# Patient Record
Sex: MALE | Race: WHITE | NOT HISPANIC OR LATINO | URBAN - METROPOLITAN AREA
[De-identification: names, ages, dates, MRNs, and addresses within clinical notes are randomized per-mention and may not be internally consistent; named-entity substitution may affect disease eponyms.]

---

## 2018-09-28 PROBLEM — Z00.00 ENCOUNTER FOR PREVENTIVE HEALTH EXAMINATION: Status: ACTIVE | Noted: 2018-09-28

## 2018-10-01 ENCOUNTER — OUTPATIENT (OUTPATIENT)
Dept: OUTPATIENT SERVICES | Facility: HOSPITAL | Age: 58
LOS: 1 days | End: 2018-10-01
Payer: MEDICAID

## 2018-10-01 PROCEDURE — G9001: CPT

## 2018-10-02 ENCOUNTER — APPOINTMENT (OUTPATIENT)
Dept: PULMONOLOGY | Facility: CLINIC | Age: 58
End: 2018-10-02

## 2018-10-03 ENCOUNTER — INPATIENT (INPATIENT)
Facility: HOSPITAL | Age: 58
LOS: 28 days | Discharge: HOME | DRG: 189 | End: 2018-11-01
Attending: HOSPITALIST | Admitting: HOSPITALIST
Payer: MEDICAID

## 2018-10-03 VITALS
DIASTOLIC BLOOD PRESSURE: 84 MMHG | RESPIRATION RATE: 22 BRPM | OXYGEN SATURATION: 98 % | HEART RATE: 84 BPM | TEMPERATURE: 99 F | SYSTOLIC BLOOD PRESSURE: 188 MMHG

## 2018-10-03 DIAGNOSIS — I50.9 HEART FAILURE, UNSPECIFIED: ICD-10-CM

## 2018-10-03 LAB
ALBUMIN SERPL ELPH-MCNC: 4.5 G/DL — SIGNIFICANT CHANGE UP (ref 3.3–5.2)
ALP SERPL-CCNC: 70 U/L — SIGNIFICANT CHANGE UP (ref 40–120)
ALT FLD-CCNC: 17 U/L — SIGNIFICANT CHANGE UP
ANION GAP SERPL CALC-SCNC: 9 MMOL/L — SIGNIFICANT CHANGE UP (ref 5–17)
APTT BLD: 37.7 SEC — HIGH (ref 27.5–37.4)
AST SERPL-CCNC: 15 U/L — SIGNIFICANT CHANGE UP
BASE EXCESS BLDA CALC-SCNC: 8.5 MMOL/L — HIGH (ref -2–2)
BILIRUB SERPL-MCNC: 0.5 MG/DL — SIGNIFICANT CHANGE UP (ref 0.4–2)
BLOOD GAS COMMENTS ARTERIAL: SIGNIFICANT CHANGE UP
BUN SERPL-MCNC: 15 MG/DL — SIGNIFICANT CHANGE UP (ref 8–20)
CALCIUM SERPL-MCNC: 9.4 MG/DL — SIGNIFICANT CHANGE UP (ref 8.6–10.2)
CHLORIDE SERPL-SCNC: 90 MMOL/L — LOW (ref 98–107)
CO2 SERPL-SCNC: 35 MMOL/L — HIGH (ref 22–29)
CREAT SERPL-MCNC: 0.64 MG/DL — SIGNIFICANT CHANGE UP (ref 0.5–1.3)
EOSINOPHIL # BLD AUTO: 0 K/UL — SIGNIFICANT CHANGE UP (ref 0–0.5)
EOSINOPHIL NFR BLD AUTO: 0.1 % — SIGNIFICANT CHANGE UP (ref 0–5)
GAS PNL BLDA: SIGNIFICANT CHANGE UP
GLUCOSE SERPL-MCNC: 259 MG/DL — HIGH (ref 70–115)
HCO3 BLDA-SCNC: 32 MMOL/L — HIGH (ref 20–26)
HCT VFR BLD CALC: 51.4 % — SIGNIFICANT CHANGE UP (ref 42–52)
HGB BLD-MCNC: 15.8 G/DL — SIGNIFICANT CHANGE UP (ref 14–18)
HOROWITZ INDEX BLDA+IHG-RTO: SIGNIFICANT CHANGE UP
INR BLD: 1.11 RATIO — SIGNIFICANT CHANGE UP (ref 0.88–1.16)
LYMPHOCYTES # BLD AUTO: 1.3 K/UL — SIGNIFICANT CHANGE UP (ref 1–4.8)
LYMPHOCYTES # BLD AUTO: 14.3 % — LOW (ref 20–55)
MCHC RBC-ENTMCNC: 28.8 PG — SIGNIFICANT CHANGE UP (ref 27–31)
MCHC RBC-ENTMCNC: 30.7 G/DL — LOW (ref 32–36)
MCV RBC AUTO: 93.8 FL — SIGNIFICANT CHANGE UP (ref 80–94)
MONOCYTES # BLD AUTO: 0.8 K/UL — SIGNIFICANT CHANGE UP (ref 0–0.8)
MONOCYTES NFR BLD AUTO: 8.7 % — SIGNIFICANT CHANGE UP (ref 3–10)
NEUTROPHILS # BLD AUTO: 6.8 K/UL — SIGNIFICANT CHANGE UP (ref 1.8–8)
NEUTROPHILS NFR BLD AUTO: 76.2 % — HIGH (ref 37–73)
NT-PROBNP SERPL-SCNC: 1242 PG/ML — HIGH (ref 0–300)
PCO2 BLDA: 69 MMHG — HIGH (ref 35–45)
PH BLDA: 7.34 — LOW (ref 7.35–7.45)
PLATELET # BLD AUTO: 148 K/UL — LOW (ref 150–400)
PO2 BLDA: 57 MMHG — LOW (ref 83–108)
POTASSIUM SERPL-MCNC: 5 MMOL/L — SIGNIFICANT CHANGE UP (ref 3.5–5.3)
POTASSIUM SERPL-SCNC: 5 MMOL/L — SIGNIFICANT CHANGE UP (ref 3.5–5.3)
PROT SERPL-MCNC: 7.3 G/DL — SIGNIFICANT CHANGE UP (ref 6.6–8.7)
PROTHROM AB SERPL-ACNC: 12.2 SEC — SIGNIFICANT CHANGE UP (ref 9.8–12.7)
RBC # BLD: 5.48 M/UL — SIGNIFICANT CHANGE UP (ref 4.6–6.2)
RBC # FLD: 14.9 % — SIGNIFICANT CHANGE UP (ref 11–15.6)
SAO2 % BLDA: 92 % — LOW (ref 95–99)
SODIUM SERPL-SCNC: 134 MMOL/L — LOW (ref 135–145)
WBC # BLD: 8.9 K/UL — SIGNIFICANT CHANGE UP (ref 4.8–10.8)
WBC # FLD AUTO: 8.9 K/UL — SIGNIFICANT CHANGE UP (ref 4.8–10.8)

## 2018-10-03 PROCEDURE — 99285 EMERGENCY DEPT VISIT HI MDM: CPT

## 2018-10-03 PROCEDURE — 93010 ELECTROCARDIOGRAM REPORT: CPT

## 2018-10-03 PROCEDURE — 71046 X-RAY EXAM CHEST 2 VIEWS: CPT | Mod: 26

## 2018-10-03 PROCEDURE — 99497 ADVNCD CARE PLAN 30 MIN: CPT | Mod: 25

## 2018-10-03 PROCEDURE — 99223 1ST HOSP IP/OBS HIGH 75: CPT

## 2018-10-03 RX ORDER — FUROSEMIDE 40 MG
40 TABLET ORAL EVERY 12 HOURS
Qty: 0 | Refills: 0 | Status: DISCONTINUED | OUTPATIENT
Start: 2018-10-03 | End: 2018-10-04

## 2018-10-03 RX ORDER — METOPROLOL TARTRATE 50 MG
12.5 TABLET ORAL
Qty: 0 | Refills: 0 | Status: DISCONTINUED | OUTPATIENT
Start: 2018-10-03 | End: 2018-10-12

## 2018-10-03 RX ORDER — DEXTROSE 50 % IN WATER 50 %
25 SYRINGE (ML) INTRAVENOUS ONCE
Qty: 0 | Refills: 0 | Status: DISCONTINUED | OUTPATIENT
Start: 2018-10-03 | End: 2018-11-01

## 2018-10-03 RX ORDER — GLUCAGON INJECTION, SOLUTION 0.5 MG/.1ML
1 INJECTION, SOLUTION SUBCUTANEOUS ONCE
Qty: 0 | Refills: 0 | Status: DISCONTINUED | OUTPATIENT
Start: 2018-10-03 | End: 2018-11-01

## 2018-10-03 RX ORDER — ASPIRIN/CALCIUM CARB/MAGNESIUM 324 MG
81 TABLET ORAL DAILY
Qty: 0 | Refills: 0 | Status: DISCONTINUED | OUTPATIENT
Start: 2018-10-03 | End: 2018-11-01

## 2018-10-03 RX ORDER — PANTOPRAZOLE SODIUM 20 MG/1
40 TABLET, DELAYED RELEASE ORAL
Qty: 0 | Refills: 0 | Status: DISCONTINUED | OUTPATIENT
Start: 2018-10-03 | End: 2018-11-01

## 2018-10-03 RX ORDER — ATORVASTATIN CALCIUM 80 MG/1
40 TABLET, FILM COATED ORAL AT BEDTIME
Qty: 0 | Refills: 0 | Status: DISCONTINUED | OUTPATIENT
Start: 2018-10-03 | End: 2018-11-01

## 2018-10-03 RX ORDER — DEXTROSE 50 % IN WATER 50 %
12.5 SYRINGE (ML) INTRAVENOUS ONCE
Qty: 0 | Refills: 0 | Status: DISCONTINUED | OUTPATIENT
Start: 2018-10-03 | End: 2018-11-01

## 2018-10-03 RX ORDER — LISINOPRIL 2.5 MG/1
5 TABLET ORAL DAILY
Qty: 0 | Refills: 0 | Status: DISCONTINUED | OUTPATIENT
Start: 2018-10-03 | End: 2018-10-11

## 2018-10-03 RX ORDER — IPRATROPIUM/ALBUTEROL SULFATE 18-103MCG
3 AEROSOL WITH ADAPTER (GRAM) INHALATION EVERY 6 HOURS
Qty: 0 | Refills: 0 | Status: DISCONTINUED | OUTPATIENT
Start: 2018-10-03 | End: 2018-11-01

## 2018-10-03 RX ORDER — ACETAMINOPHEN 500 MG
650 TABLET ORAL EVERY 6 HOURS
Qty: 0 | Refills: 0 | Status: DISCONTINUED | OUTPATIENT
Start: 2018-10-03 | End: 2018-10-31

## 2018-10-03 RX ORDER — SODIUM CHLORIDE 9 MG/ML
1000 INJECTION, SOLUTION INTRAVENOUS
Qty: 0 | Refills: 0 | Status: DISCONTINUED | OUTPATIENT
Start: 2018-10-03 | End: 2018-11-01

## 2018-10-03 RX ORDER — FUROSEMIDE 40 MG
40 TABLET ORAL ONCE
Qty: 0 | Refills: 0 | Status: COMPLETED | OUTPATIENT
Start: 2018-10-03 | End: 2018-10-03

## 2018-10-03 RX ORDER — DEXTROSE 50 % IN WATER 50 %
15 SYRINGE (ML) INTRAVENOUS ONCE
Qty: 0 | Refills: 0 | Status: DISCONTINUED | OUTPATIENT
Start: 2018-10-03 | End: 2018-11-01

## 2018-10-03 RX ORDER — INSULIN LISPRO 100/ML
10 VIAL (ML) SUBCUTANEOUS ONCE
Qty: 0 | Refills: 0 | Status: COMPLETED | OUTPATIENT
Start: 2018-10-03 | End: 2018-10-03

## 2018-10-03 RX ORDER — INSULIN LISPRO 100/ML
VIAL (ML) SUBCUTANEOUS
Qty: 0 | Refills: 0 | Status: DISCONTINUED | OUTPATIENT
Start: 2018-10-03 | End: 2018-10-06

## 2018-10-03 RX ORDER — ASPIRIN/CALCIUM CARB/MAGNESIUM 324 MG
81 TABLET ORAL DAILY
Qty: 0 | Refills: 0 | Status: DISCONTINUED | OUTPATIENT
Start: 2018-10-03 | End: 2018-10-03

## 2018-10-03 RX ORDER — ALPRAZOLAM 0.25 MG
1 TABLET ORAL
Qty: 0 | Refills: 0 | Status: DISCONTINUED | OUTPATIENT
Start: 2018-10-03 | End: 2018-10-10

## 2018-10-03 RX ORDER — CLOPIDOGREL BISULFATE 75 MG/1
75 TABLET, FILM COATED ORAL DAILY
Qty: 0 | Refills: 0 | Status: DISCONTINUED | OUTPATIENT
Start: 2018-10-03 | End: 2018-11-01

## 2018-10-03 RX ORDER — INSULIN GLARGINE 100 [IU]/ML
10 INJECTION, SOLUTION SUBCUTANEOUS ONCE
Qty: 0 | Refills: 0 | Status: COMPLETED | OUTPATIENT
Start: 2018-10-03 | End: 2018-10-04

## 2018-10-03 RX ORDER — HEPARIN SODIUM 5000 [USP'U]/ML
5000 INJECTION INTRAVENOUS; SUBCUTANEOUS EVERY 8 HOURS
Qty: 0 | Refills: 0 | Status: DISCONTINUED | OUTPATIENT
Start: 2018-10-03 | End: 2018-10-06

## 2018-10-03 RX ORDER — IPRATROPIUM/ALBUTEROL SULFATE 18-103MCG
3 AEROSOL WITH ADAPTER (GRAM) INHALATION ONCE
Qty: 0 | Refills: 0 | Status: COMPLETED | OUTPATIENT
Start: 2018-10-03 | End: 2018-10-03

## 2018-10-03 RX ADMIN — Medication 3 MILLILITER(S): at 19:11

## 2018-10-03 RX ADMIN — Medication 100 MILLIGRAM(S): at 19:40

## 2018-10-03 RX ADMIN — Medication 10 UNIT(S): at 23:51

## 2018-10-03 RX ADMIN — Medication 40 MILLIGRAM(S): at 22:01

## 2018-10-03 RX ADMIN — Medication 125 MILLIGRAM(S): at 19:12

## 2018-10-03 RX ADMIN — ATORVASTATIN CALCIUM 40 MILLIGRAM(S): 80 TABLET, FILM COATED ORAL at 22:44

## 2018-10-03 NOTE — H&P ADULT - PROBLEM SELECTOR PLAN 1
due to acute on chronic COPD exacerbation as evidenced by Pox of 59% en route to ED as recorded in sheet scanned into alpha  Admit to telemetry in setting of CHF and abnormal EKG  Nebs q 6 hrs  EKG reviewed, RBB, tachycardia, q waves  troponin STAT  RVP STAT-given URI sx, likely viral component  solumedrol 125 IV then 40 q 6 with plan for taper  NC prn SOB for goal >92%, now improved and comfortable on 3L   cbc, cmp, mg, phos, coags for AM  ABG reviewed, appears to be chronic and compensated acidosis  Pulmonary consult- Pt was to see Dr. Maciel in 2015 however did not show to appt due to not being able to obtain cab  PT consult  levaquin 750mg po qd

## 2018-10-03 NOTE — H&P ADULT - PROBLEM SELECTOR PLAN 6
HISS  cont lantus bid- confirm with pharmacy  10 units lantus now for blood sugar >300 on solumedrol and PO diet with additional 10 humalog coverage

## 2018-10-03 NOTE — H&P ADULT - ASSESSMENT
Pt is a 57 yo male with a pmh/o pulmonary HTN, GERD, Bipolar disorder, HTN, HLD, current everyday 0.5 PPD smoker DMII on insulin severe COPD/emphysema on home oxygen 2L who is admitted for acute on chronic respiratory failure with hypoxia and hypercapnia and acute on chronic CHF of unknown type.

## 2018-10-03 NOTE — H&P ADULT - PROBLEM SELECTOR PLAN 2
admit to telemetry  cardiology consult- [facility] ALVARO Cervantes to evaluate pt  strict i/o's  daily weights  TTE ordered  ACE started at low dose-confirm meds with pharmacy  Beta blocker- started at low dose-confirm with pharmacy  serial troponins  Pro-BnP in AM  cbc, cmp, coags, mg, phos  IV diuresis with close monitoring of renal function

## 2018-10-03 NOTE — ED ADULT NURSE NOTE - OBJECTIVE STATEMENT
57 y/o male with linh of COPD and coronary stent in 2016 presents with COPD exacerbation at 1500 after smoking cigarette. Pt. denies any CP/N/V/D/Fever/cough/congestion. or recent infection. Pt. uses 2.5 L NC at home and smokes daily. Pt. is on cardiac monitor with 3L NC. Pt. a/ ox 4 calm, and cooperative. Pt. has telangiectasis on nose, skin warm, dry, and intact. RR even and labored with shallow respirations and diminished lungs sounds with rhonhi and exp wheezes. RRR with no murmurs, +2 palpable pulses. No edema noted.

## 2018-10-03 NOTE — H&P ADULT - HISTORY OF PRESENT ILLNESS
Pt is a 57 yo male with a pmh/o pulmonary HTN, HTN, HLD, current everyday 0.5 PPD smoker DMII on insulin severe COPD/emphysema on home oxygen 2L (which his sister bought after cardiology in [place] recommended it after last hospitalization) and CHF with last exacerbation in 2015 for which he was hospitalized in [place] Pt is a 57 yo male with a pmh/o pulmonary HTN, GERD, Bipolar disorder, HTN, HLD, current everyday 0.5 PPD smoker DMII on insulin severe COPD/emphysema on home oxygen 2L (which his sister bought after cardiology in [place] recommended it after last hospitalization) and CHF with last exacerbation in 2015 with PCI intervention (states it was a balloon for a 95% occlusion of his right pulmonary artery?) for which he was hospitalized in [place], who presents today with complaints of worsening shortness of breath and congestion. Pt states he lives in a rented room in a home where everyone is sick with a cold. Pt reports waking up today to smoke after drinking nyquil for his congestion and dry cough and approx one hour later states he could not breathe despite using his oxygen. Pt denies any cp, palpitations, n/v/d, HA, fever, chills, lethargy, gu sx. Pt uses [facility] which is closed, states he is on a blood thinner that is not coumadin but does not know. Additional meds reconciled however pt does not know doses.

## 2018-10-03 NOTE — H&P ADULT - FAMILY HISTORY
Father  Still living? Unknown  No family history of cardiovascular disease, Age at diagnosis: Age Unknown  No family history of chronic obstructive pulmonary disease, Age at diagnosis: Age Unknown  No family history of hypertension, Age at diagnosis: Age Unknown  No family history of mental disorder, Age at diagnosis: Age Unknown     Mother  Still living? Unknown  No family history of cardiovascular disease, Age at diagnosis: Age Unknown  No family history of chronic obstructive pulmonary disease, Age at diagnosis: Age Unknown  No family history of hypertension, Age at diagnosis: Age Unknown  No family history of mental disorder, Age at diagnosis: Age Unknown

## 2018-10-03 NOTE — ED PROVIDER NOTE - OBJECTIVE STATEMENT
58 year old male smoker with a h/o COPD and CAD  PRESENTS WITH SOB and cough. pt used his nebulizer two times today

## 2018-10-03 NOTE — ED PROVIDER NOTE - RESPIRATORY DISTRESS
84 year old male is seen bedside s/p Right AKA (6/27/17) for f/u pre-ulcerative lesion of the left heel. Pt's daughter is bedside. Pt states he is feeling well. Pt denies left foot/ankle pain. Pt denies N/V/F/C/SOB/CP/Diarrhea/headaches.    MEDICATIONS:  MEDICATIONS  (STANDING):  aspirin  chewable 81 milliGRAM(s) Oral daily  heparin  Injectable 5000 Unit(s) SubCutaneous every 8 hours  allopurinol 100 milliGRAM(s) Oral daily  doxazosin 8 milliGRAM(s) Oral at bedtime  fenofibrate Tablet 145 milliGRAM(s) Oral daily  ferrous    sulfate 325 milliGRAM(s) Oral two times a day with meals  gabapentin 300 milliGRAM(s) Oral daily  hydrALAZINE 50 milliGRAM(s) Oral daily  HYDROmorphone   Tablet 2 milliGRAM(s) Oral every 6 hours  isosorbide   mononitrate ER Tablet (IMDUR) 120 milliGRAM(s) Oral daily  pramipexole 0.125 milliGRAM(s) Oral daily  senna 2 Tablet(s) Oral at bedtime  simvastatin 10 milliGRAM(s) Oral at bedtime  piperacillin/tazobactam IVPB. 3.375 Gram(s) IV Intermittent every 8 hours  ammonium lactate 12% Lotion 1 Application(s) Topical daily  buDESOnide   0.5 milliGRAM(s) Respule 0.5 milliGRAM(s) Inhalation two times a day  metoprolol 25 milliGRAM(s) Oral two times a day  vancomycin  IVPB 500 milliGRAM(s) IV Intermittent every 12 hours  dextrose 5%. 1000 milliLiter(s) (50 mL/Hr) IV Continuous <Continuous>    Allergies: NKFDA    Vital Signs Last 24 Hrs  T(C): 36.4 (30 Jun 2017 05:52), Max: 37.4 (29 Jun 2017 23:55)  T(F): 97.6 (30 Jun 2017 05:52), Max: 99.3 (29 Jun 2017 23:55)  HR: 68 (30 Jun 2017 08:00) (60 - 102)  BP: 160/53 (30 Jun 2017 06:00) (136/35 - 168/59)  BP(mean): 82 (30 Jun 2017 06:00) (61 - 88)  RR: 18 (30 Jun 2017 08:00) (14 - 21)  SpO2: 96% (30 Jun 2017 08:00) (89% - 98%)    I&O's Summary    29 Jun 2017 07:01  -  30 Jun 2017 07:00  --------------------------------------------------------  IN: 932 mL / OUT: 750 mL / NET: 182 mL      PHYSICAL EXAM:  Constitutional: NAD, awake and alert, well-developed  Extremities: Right LE AKA  Vascular: Weakly palpable DP and PT pulses of the left foot.  Neuro: Protective sensation is decreased.  Derm: Dressing noted to be clean, dry and intact on the LLE. Preulcerative lesion noted on the postero plantar left heel. No fluctuance noted of the left foot. No active drainage noted. No probe to bone. No malodor. Left hallux toenail are elongated, thickened and dystrophic. No acute signs of infection noted.  MSK: No pain upon palpation of the left lower leg and foot.     LABS: All Labs Reviewed:                        8.0    5.9   )-----------( 227      ( 30 Jun 2017 05:45 )             24.8     06-30    148<H>  |  114<H>  |  35<H>  ----------------------------<  121<H>  3.6   |  26  |  1.38<H>    Ca    7.8<L>      30 Jun 2017 05:45    Blood Culture:   Culture - Blood (06.17.17 @ 03:54)    Specimen Source: .Blood Blood    Culture Results:   No growth at 5 days. no

## 2018-10-03 NOTE — ED ADULT NURSE REASSESSMENT NOTE - NS ED NURSE REASSESS COMMENT FT1
Report received from off going RN, charting as noted. Patient A&Ox4, denies any pain or discomfort. Respirations even & unlabored, denies any numbness or tingling. SpO2 93% w/O2 in place via nasal cannula at 3 LPM. Denies any chest pain, shortness of breath, nausea or dizziness. Saline lock in place, patent, negative s/s phlebitis or infiltration. Plan of care discussed, all questions answered. Will monitor.

## 2018-10-03 NOTE — H&P ADULT - NSHPLABSRESULTS_GEN_ALL_CORE
15.8   8.9    )----------(  148       ( 03 Oct 2018 19:44 )               51.4      134    |  90     |  15.0   ----------------------------<  259        ( 03 Oct 2018 19:44 )  5.0     |  35.0   |  0.64     Ca    9.4        ( 03 Oct 2018 19:44 )    TPro  7.3    /  Alb  4.5    /  TBili  0.5    /  DBili  x      /  AST  15     /  ALT  17     /  AlkPhos  70     ( 03 Oct 2018 19:44 )    LIVER FUNCTIONS - ( 03 Oct 2018 19:44 )  Alb: 4.5 g/dL / Pro: 7.3 g/dL / ALK PHOS: 70 U/L / ALT: 17 U/L / AST: 15 U/L / GGT: x           PT/INR -  12.2 sec / 1.11 ratio   ( 03 Oct 2018 19:44 )       PTT -  37.7 sec   ( 03 Oct 2018 19:44 )  CAPILLARY BLOOD GLUCOSE    CARDIAC MARKERS ( 04 Oct 2018 01:49 )  x     / <0.01 ng/mL / x     / x     / x

## 2018-10-03 NOTE — H&P ADULT - PMH
Bipolar 1 disorder    COPD (chronic obstructive pulmonary disease)    Coronary artery disease involving native coronary artery of native heart without angina pectoris    Gastroesophageal reflux disease, esophagitis presence not specified    HLD (hyperlipidemia)    Oxygen dependent    Pulmonary HTN    Smoker    Stented coronary artery    Type 2 diabetes mellitus without complication, with long-term current use of insulin

## 2018-10-03 NOTE — H&P ADULT - ATTENDING COMMENTS
30 min time spent discussing advanced care planning including code status, existence of health care proxy, plan of treatment, consultants if called, and prognosis. Family and pt in agreement with above, all questions answered and concerns addressed.      FULL CODE  agrees to cardiology and pulmonary consult  agrees to ABG and labwork in AM   agrees to TTE and diuresis  Confirm meds in AM-agrees to allow MD to contact family and pharmacy

## 2018-10-04 DIAGNOSIS — K21.9 GASTRO-ESOPHAGEAL REFLUX DISEASE WITHOUT ESOPHAGITIS: ICD-10-CM

## 2018-10-04 DIAGNOSIS — I50.21 ACUTE SYSTOLIC (CONGESTIVE) HEART FAILURE: ICD-10-CM

## 2018-10-04 DIAGNOSIS — I25.10 ATHEROSCLEROTIC HEART DISEASE OF NATIVE CORONARY ARTERY WITHOUT ANGINA PECTORIS: ICD-10-CM

## 2018-10-04 DIAGNOSIS — J96.21 ACUTE AND CHRONIC RESPIRATORY FAILURE WITH HYPOXIA: ICD-10-CM

## 2018-10-04 DIAGNOSIS — I50.9 HEART FAILURE, UNSPECIFIED: ICD-10-CM

## 2018-10-04 DIAGNOSIS — E11.9 TYPE 2 DIABETES MELLITUS WITHOUT COMPLICATIONS: ICD-10-CM

## 2018-10-04 DIAGNOSIS — E78.5 HYPERLIPIDEMIA, UNSPECIFIED: ICD-10-CM

## 2018-10-04 DIAGNOSIS — F17.200 NICOTINE DEPENDENCE, UNSPECIFIED, UNCOMPLICATED: ICD-10-CM

## 2018-10-04 LAB
ALBUMIN SERPL ELPH-MCNC: 4.2 G/DL — SIGNIFICANT CHANGE UP (ref 3.3–5.2)
ALP SERPL-CCNC: 68 U/L — SIGNIFICANT CHANGE UP (ref 40–120)
ALT FLD-CCNC: 16 U/L — SIGNIFICANT CHANGE UP
ANION GAP SERPL CALC-SCNC: 13 MMOL/L — SIGNIFICANT CHANGE UP (ref 5–17)
APTT BLD: 27.6 SEC — SIGNIFICANT CHANGE UP (ref 27.5–37.4)
AST SERPL-CCNC: 12 U/L — SIGNIFICANT CHANGE UP
BILIRUB SERPL-MCNC: 0.5 MG/DL — SIGNIFICANT CHANGE UP (ref 0.4–2)
BUN SERPL-MCNC: 20 MG/DL — SIGNIFICANT CHANGE UP (ref 8–20)
CALCIUM SERPL-MCNC: 9.2 MG/DL — SIGNIFICANT CHANGE UP (ref 8.6–10.2)
CHLORIDE SERPL-SCNC: 87 MMOL/L — LOW (ref 98–107)
CHOLEST SERPL-MCNC: 136 MG/DL — SIGNIFICANT CHANGE UP (ref 110–199)
CO2 SERPL-SCNC: 34 MMOL/L — HIGH (ref 22–29)
CREAT SERPL-MCNC: 0.82 MG/DL — SIGNIFICANT CHANGE UP (ref 0.5–1.3)
GLUCOSE BLDC GLUCOMTR-MCNC: 275 MG/DL — HIGH (ref 70–99)
GLUCOSE BLDC GLUCOMTR-MCNC: 351 MG/DL — HIGH (ref 70–99)
GLUCOSE BLDC GLUCOMTR-MCNC: 357 MG/DL — HIGH (ref 70–99)
GLUCOSE BLDC GLUCOMTR-MCNC: 372 MG/DL — HIGH (ref 70–99)
GLUCOSE BLDC GLUCOMTR-MCNC: 380 MG/DL — HIGH (ref 70–99)
GLUCOSE SERPL-MCNC: 371 MG/DL — HIGH (ref 70–115)
HBA1C BLD-MCNC: 10.8 % — HIGH (ref 4–5.6)
HCT VFR BLD CALC: 49 % — SIGNIFICANT CHANGE UP (ref 42–52)
HDLC SERPL-MCNC: 27 MG/DL — LOW
HGB BLD-MCNC: 15.6 G/DL — SIGNIFICANT CHANGE UP (ref 14–18)
INR BLD: 1.19 RATIO — HIGH (ref 0.88–1.16)
LIPID PNL WITH DIRECT LDL SERPL: 85 MG/DL — SIGNIFICANT CHANGE UP
MAGNESIUM SERPL-MCNC: 1.8 MG/DL — SIGNIFICANT CHANGE UP (ref 1.6–2.6)
MCHC RBC-ENTMCNC: 28.8 PG — SIGNIFICANT CHANGE UP (ref 27–31)
MCHC RBC-ENTMCNC: 31.8 G/DL — LOW (ref 32–36)
MCV RBC AUTO: 90.6 FL — SIGNIFICANT CHANGE UP (ref 80–94)
NT-PROBNP SERPL-SCNC: 1258 PG/ML — HIGH (ref 0–300)
PHOSPHATE SERPL-MCNC: 4.6 MG/DL — SIGNIFICANT CHANGE UP (ref 2.4–4.7)
PLATELET # BLD AUTO: 165 K/UL — SIGNIFICANT CHANGE UP (ref 150–400)
POTASSIUM SERPL-MCNC: 4.6 MMOL/L — SIGNIFICANT CHANGE UP (ref 3.5–5.3)
POTASSIUM SERPL-SCNC: 4.6 MMOL/L — SIGNIFICANT CHANGE UP (ref 3.5–5.3)
PROT SERPL-MCNC: 7 G/DL — SIGNIFICANT CHANGE UP (ref 6.6–8.7)
PROTHROM AB SERPL-ACNC: 13.1 SEC — HIGH (ref 9.8–12.7)
RAPID RVP RESULT: DETECTED
RBC # BLD: 5.41 M/UL — SIGNIFICANT CHANGE UP (ref 4.6–6.2)
RBC # FLD: 14.8 % — SIGNIFICANT CHANGE UP (ref 11–15.6)
RV+EV RNA SPEC QL NAA+PROBE: DETECTED
SODIUM SERPL-SCNC: 134 MMOL/L — LOW (ref 135–145)
TOTAL CHOLESTEROL/HDL RATIO MEASUREMENT: 5 RATIO — SIGNIFICANT CHANGE UP (ref 3.4–9.6)
TRIGL SERPL-MCNC: 122 MG/DL — SIGNIFICANT CHANGE UP (ref 10–200)
TROPONIN T SERPL-MCNC: <0.01 NG/ML — SIGNIFICANT CHANGE UP (ref 0–0.06)
TROPONIN T SERPL-MCNC: <0.01 NG/ML — SIGNIFICANT CHANGE UP (ref 0–0.06)
TSH SERPL-MCNC: 0.4 UIU/ML — SIGNIFICANT CHANGE UP (ref 0.27–4.2)
WBC # BLD: 6.9 K/UL — SIGNIFICANT CHANGE UP (ref 4.8–10.8)
WBC # FLD AUTO: 6.9 K/UL — SIGNIFICANT CHANGE UP (ref 4.8–10.8)

## 2018-10-04 PROCEDURE — 99222 1ST HOSP IP/OBS MODERATE 55: CPT

## 2018-10-04 PROCEDURE — 99223 1ST HOSP IP/OBS HIGH 75: CPT

## 2018-10-04 PROCEDURE — 93306 TTE W/DOPPLER COMPLETE: CPT | Mod: 26

## 2018-10-04 PROCEDURE — 99233 SBSQ HOSP IP/OBS HIGH 50: CPT

## 2018-10-04 RX ORDER — NYSTATIN 500MM UNIT
500000 POWDER (EA) MISCELLANEOUS
Qty: 0 | Refills: 0 | Status: DISCONTINUED | OUTPATIENT
Start: 2018-10-04 | End: 2018-11-01

## 2018-10-04 RX ORDER — AZITHROMYCIN 500 MG/1
500 TABLET, FILM COATED ORAL DAILY
Qty: 0 | Refills: 0 | Status: DISCONTINUED | OUTPATIENT
Start: 2018-10-04 | End: 2018-10-06

## 2018-10-04 RX ORDER — NICOTINE POLACRILEX 2 MG
1 GUM BUCCAL DAILY
Qty: 0 | Refills: 0 | Status: DISCONTINUED | OUTPATIENT
Start: 2018-10-04 | End: 2018-11-01

## 2018-10-04 RX ORDER — FUROSEMIDE 40 MG
40 TABLET ORAL
Qty: 0 | Refills: 0 | Status: DISCONTINUED | OUTPATIENT
Start: 2018-10-04 | End: 2018-10-05

## 2018-10-04 RX ORDER — ACETAMINOPHEN 500 MG
650 TABLET ORAL ONCE
Qty: 0 | Refills: 0 | Status: COMPLETED | OUTPATIENT
Start: 2018-10-04 | End: 2018-10-04

## 2018-10-04 RX ADMIN — CLOPIDOGREL BISULFATE 75 MILLIGRAM(S): 75 TABLET, FILM COATED ORAL at 00:15

## 2018-10-04 RX ADMIN — Medication 650 MILLIGRAM(S): at 15:44

## 2018-10-04 RX ADMIN — Medication 10: at 08:13

## 2018-10-04 RX ADMIN — Medication 40 MILLIGRAM(S): at 17:55

## 2018-10-04 RX ADMIN — Medication 3 MILLILITER(S): at 23:54

## 2018-10-04 RX ADMIN — AZITHROMYCIN 500 MILLIGRAM(S): 500 TABLET, FILM COATED ORAL at 21:29

## 2018-10-04 RX ADMIN — Medication 650 MILLIGRAM(S): at 14:02

## 2018-10-04 RX ADMIN — Medication 3 MILLILITER(S): at 08:54

## 2018-10-04 RX ADMIN — PANTOPRAZOLE SODIUM 40 MILLIGRAM(S): 20 TABLET, DELAYED RELEASE ORAL at 05:11

## 2018-10-04 RX ADMIN — Medication 40 MILLIGRAM(S): at 23:53

## 2018-10-04 RX ADMIN — HEPARIN SODIUM 5000 UNIT(S): 5000 INJECTION INTRAVENOUS; SUBCUTANEOUS at 05:11

## 2018-10-04 RX ADMIN — Medication 500000 UNIT(S): at 17:53

## 2018-10-04 RX ADMIN — Medication 1 MILLIGRAM(S): at 00:15

## 2018-10-04 RX ADMIN — Medication 81 MILLIGRAM(S): at 13:30

## 2018-10-04 RX ADMIN — Medication 1 MILLIGRAM(S): at 05:10

## 2018-10-04 RX ADMIN — Medication 40 MILLIGRAM(S): at 05:10

## 2018-10-04 RX ADMIN — HEPARIN SODIUM 5000 UNIT(S): 5000 INJECTION INTRAVENOUS; SUBCUTANEOUS at 21:22

## 2018-10-04 RX ADMIN — LISINOPRIL 5 MILLIGRAM(S): 2.5 TABLET ORAL at 05:10

## 2018-10-04 RX ADMIN — HEPARIN SODIUM 5000 UNIT(S): 5000 INJECTION INTRAVENOUS; SUBCUTANEOUS at 13:30

## 2018-10-04 RX ADMIN — Medication 10: at 14:58

## 2018-10-04 RX ADMIN — Medication 3 MILLILITER(S): at 14:19

## 2018-10-04 RX ADMIN — Medication 40 MILLIGRAM(S): at 05:11

## 2018-10-04 RX ADMIN — Medication 3 MILLILITER(S): at 22:13

## 2018-10-04 RX ADMIN — INSULIN GLARGINE 10 UNIT(S): 100 INJECTION, SOLUTION SUBCUTANEOUS at 00:14

## 2018-10-04 RX ADMIN — Medication 40 MILLIGRAM(S): at 13:30

## 2018-10-04 RX ADMIN — Medication 1 PATCH: at 14:58

## 2018-10-04 RX ADMIN — Medication 3 MILLILITER(S): at 01:01

## 2018-10-04 RX ADMIN — ATORVASTATIN CALCIUM 40 MILLIGRAM(S): 80 TABLET, FILM COATED ORAL at 21:22

## 2018-10-04 RX ADMIN — Medication 40 MILLIGRAM(S): at 00:15

## 2018-10-04 RX ADMIN — CLOPIDOGREL BISULFATE 75 MILLIGRAM(S): 75 TABLET, FILM COATED ORAL at 13:30

## 2018-10-04 RX ADMIN — Medication 6: at 17:54

## 2018-10-04 RX ADMIN — Medication 12.5 MILLIGRAM(S): at 05:11

## 2018-10-04 NOTE — CONSULT NOTE ADULT - ASSESSMENT
57 yo M with pulmonary HTN, GERD, Bipolar disorder, HTN, HLD, daily tobacco smoker T2DM on insulin severe COPD/emphysema on home oxygen 2L admitted with acute respiratory failure, hypoxia and acute decompensated HF 57 yo M with pulmonary HTN, GERD, Bipolar disorder, HTN, HLD, daily tobacco smoker T2DM on insulin, CAD s/p PCI in 2016 (RCA?) by Dr. Patrice Belle in [place] (I called [facility] office not open)  severe COPD/emphysema on home oxygen 2L admitted with acute respiratory failure, hypoxia and acute decompensated HF

## 2018-10-04 NOTE — CONSULT NOTE ADULT - PROBLEM SELECTOR RECOMMENDATION 2
Start Nebs q 6 hrs, RVP STAT-given URI sx, likely viral component  solumedrol 125 IV then 40 q 6 with plan for taper  NC prn SOB for goal >92%, now improved and comfortable on 3L   cbc, cmp, mg, phos, coags for AM  ABG reviewed, appears to be chronic and compensated acidosis  Pulmonary consult- Pt was to see Dr. Maciel in 2015 however did not show to appt due to not being able to obtain cab  PT consult  levaquin 750mg po qd.

## 2018-10-04 NOTE — CONSULT NOTE ADULT - PROBLEM SELECTOR RECOMMENDATION 9
Admit to monitored settings, check CBC, BMP, trend CEx2, pro-BNP, CXR and ECG in AM  Please check TTE for baseline functional and structural assessment  Strict I's & O's, daily weights, fluid restriction 1.5 L daily, Low Na diet  Start Lasix 20 mg IV BID, Continue Metoprolol and ACEI, monitor renal function  Cardiology to follow in am, case d/w Dr. Gupta (attending Cardiologist) Admit to monitored settings, check CBC, BMP, trend CEx2, pro-BNP, CXR and ECG in AM  Please check TTE for baseline functional and structural assessment  Strict I's & O's, daily weights, fluid restriction 1.5 L daily, Low Na diet  Start Lasix 20 mg IV BID, Continue Metoprolol and ACEI, monitor renal function  Patient will require ischemia w/o once respiratory status optimized  Cardiology to follow in am, case d/w Dr. Gupta (attending Cardiologist)

## 2018-10-04 NOTE — PHYSICAL THERAPY INITIAL EVALUATION ADULT - ADDITIONAL COMMENTS
Reports renting a room on the 2nd floor of a 2 story house with 1 step to enter.  Reports Modified Independent with all PTA, with SAC.

## 2018-10-04 NOTE — PROGRESS NOTE ADULT - SUBJECTIVE AND OBJECTIVE BOX
Subjective     REASON FOR FOLLOW UP:  Polycythemia    History of Present Illness The patient is a 70 year old male, here today for follow up on his polycythemia, which is felt to be secondary to his testosterone replacement.  He was seen a month ago and a phlebotomy was recommended.  At that time the patient stated he would prefer to donate blood.  He went to the [facility], and was told they would not take his blood because his iron was too high and his blood pressure was also too high.  He then went to the [facility] the following day, and was able to donate a pint of blood.    He does report fatigue.  He also complains of swelling in his legs, although on exam he only has trace lower extremity edema.  Patient continues on Testosterone replacement.  He also continues on a daily 81 mg ASA.    Past Medical History:   Diagnosis Date   • Acquired blepharoptosis of both eyes    • CAD (coronary artery disease)     mild to moderate per cath 5/2005   • Dermatochalasis of both eyelids    • Environmental allergies    • H/O Epistaxis    • History of obesity    • History of vitamin D deficiency    • Hyperlipidemia    • Seasonal allergies    • Senile cataracts of both eyes    • Skin cancer     precancer   • Sleep apnea     CPAP   • Spinal stenosis         Past Surgical History:   Procedure Laterality Date   • BACK SURGERY     • CARDIAC CATHETERIZATION     • NASAL SINUS SURGERY     • TONSILLECTOMY          Current Outpatient Prescriptions on File Prior to Visit   Medication Sig Dispense Refill   • aspirin 81 MG tablet Take 81 mg by mouth daily.     • Azelastine-Fluticasone 137-50 MCG/ACT suspension 50 mcg into the nostril(s) as directed by provider As Needed.     • citalopram (CeleXA) 20 MG tablet Take 20 mg by mouth daily.     • Fexofenadine HCl (MUCINEX ALLERGY PO) Take 600 mg by mouth Daily.     • levothyroxine (SYNTHROID, LEVOTHROID) 50 MCG tablet Take 50 mcg by mouth Daily.     • meloxicam (MOBIC) 7.5  "MG tablet Take 7.5 mg by mouth Daily.     • testosterone (ANDROGEL) 25 MG/2.5GM (1%) gel gel Place 50 mg on the skin daily.     • vitamin D (ERGOCALCIFEROL) 83454 UNITS capsule capsule Take 1.62 Units by mouth 1 (One) Time Per Week.     • [DISCONTINUED] Cetirizine HCl (ZYRTEC PO) Take  by mouth.     • [DISCONTINUED] vitamin B-12 (CYANOCOBALAMIN) 100 MCG tablet Take 250 mcg by mouth Daily.       No current facility-administered medications on file prior to visit.         ALLERGIES:  No Known Allergies     Social History     Social History   • Marital status:      Spouse name: Virginia   • Years of education: College     Occupational History   • CPA Self-Employed     Social History Main Topics   • Smoking status: Never Smoker   • Smokeless tobacco: Never Used   • Alcohol use No   • Drug use: No     Other Topics Concern   • Not on file        Family History   Problem Relation Age of Onset   • Hyperlipidemia Mother    • Heart attack Father         Review of Systems   Constitutional: Positive for fatigue.   Eyes: Negative.    Respiratory: Negative.  Negative for cough and shortness of breath.    Cardiovascular: Positive for leg swelling.   Gastrointestinal: Negative.    Genitourinary: Negative.    Musculoskeletal: Negative.    Skin: Negative.    Neurological: Negative.    Hematological: Negative.    Psychiatric/Behavioral: Negative.         Objective     Vitals:    09/28/18 0843   BP: 133/86   Pulse: 81   Resp: 12   Temp: 98.1 °F (36.7 °C)   TempSrc: Oral   SpO2: 95%   Weight: 97.5 kg (215 lb)   Height: 175 cm (68.9\")  Comment: NEW HT   PainSc: 0-No pain     Current Status 9/28/2018   ECOG score 0       Physical Exam   Constitutional: He is oriented to person, place, and time. He appears well-developed and well-nourished. No distress.   HENT:   Head: Normocephalic and atraumatic.   Eyes: Pupils are equal, round, and reactive to light.   Neck: Normal range of motion.   Cardiovascular: Normal rate, regular rhythm " and normal heart sounds.    No murmur heard.  Pulmonary/Chest: Effort normal and breath sounds normal. No respiratory distress. He has no wheezes. He has no rhonchi. He has no rales.   Musculoskeletal: Normal range of motion. He exhibits edema (trace.).   Neurological: He is alert and oriented to person, place, and time.   Skin: Skin is warm and dry. No rash noted.   Psychiatric: He has a normal mood and affect.   Vitals reviewed.        RECENT LABS:  Hematology WBC   Date Value Ref Range Status   09/28/2018 6.25 4.50 - 10.70 10*3/mm3 Final     RBC   Date Value Ref Range Status   09/28/2018 6.45 (H) 4.60 - 6.00 10*6/mm3 Final     Hemoglobin   Date Value Ref Range Status   09/28/2018 20.1 (H) 13.7 - 17.6 g/dL Final     Hematocrit   Date Value Ref Range Status   09/28/2018 57.0 (H) 40.4 - 52.2 % Final     Platelets   Date Value Ref Range Status   09/28/2018 163 140 - 500 10*3/mm3 Final          Assessment/Plan     1.  Polycythemia: This patient presents with moderate polycythemia hematocrit 55.2%.  Hematocrit has been elevated since at least January 2018.  The patient denies prior thrombotic history for either venous or arterial thromboses.  He has sleep apnea but is compliant with wearing CPAP at night and during naps.  He has been on testosterone replacement with AndroGel approximately 3 years.  The white blood cell count and platelet count are both normal.  This is most likely a secondary polycythemia secondary to testosterone replacement.  The patient does report significant benefit from testosterone and does not want to discontinue.  · We recommended the patient to undergo phlebotomy.  Prefer to keep his hematocrit below 52% given his other cardiovascular risk factors of overweight, hyperlipidemia, inactivity etc.  Patient would rather donate blood.   · He was able to donate to the [facility] one month ago (they will only accept donations every 56 days), but returns today, 9/28/2018 with an increase in  his Hgb and Hct from last visit. I have discussed with Dr. Alvarez.  Patient needs to hold his Testosterone until get better control.  We will perform a phlebotomy today of 500 cc, and the patient will return in 2 weeks for reevaluation with CBC and possible phlebotomy.               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 Pt states he lives in a rented room in a home where everyone is sick with a cold. Pt reports waking up today to smoke after drinking nyquil for his congestion and dry cough and approx one hour later states he could not breathe despite using his oxygen. Pt denies any cp, palpitations, n/v/d, HA, fever, chills, lethargy, gu sx. Pt uses [facility] which is closed, states he is on a blood thinner that is not coumadin but does not know. Additional meds reconciled however pt does not know doses. (03 Oct 2018 22:29)    INTERVAL HPI/OVERNIGHT EVENTS:  Patient with worsening SOB over the last 2-3 days. Increased cough and wheeze. Today feeling better. Found to have +viral panel.     Vital Signs Last 24 Hrs  T(C): 36.7 (04 Oct 2018 13:39), Max: 37.3 (04 Oct 2018 04:16)  T(F): 98 (04 Oct 2018 13:39), Max: 99.1 (04 Oct 2018 04:16)  HR: 101 (04 Oct 2018 13:39) (84 - 118)  BP: 88/52 (04 Oct 2018 13:39) (88/52 - 188/84)  BP(mean): --  RR: 20 (04 Oct 2018 13:39) (20 - 24)  SpO2: 90% (04 Oct 2018 13:39) (90% - 98%)    PHYSICAL EXAM:  General: Well developed; well nourished; in no acute distress  Eyes: PERRLA, EOMI; conjunctiva and sclera clear  Head: Normocephalic; atraumatic  ENMT: No nasal discharge; airway clear  Neck: Supple; non tender; no masses  Respiratory: No wheezes, rales or rhonchi  Cardiovascular: Regular rate and rhythm. S1 and S2 Normal; No murmurs, gallops or rubs  Gastrointestinal: Soft non-tender non-distended; Normal bowel sounds  Genitourinary: No costovertebral angle tenderness  Extremities: Normal range of motion, No clubbing, cyanosis or edema  Vascular: Peripheral pulses palpable 2+ bilaterally  Neurological: Alert and oriented x4  Skin: Warm and dry. No acute rash  Lymph Nodes: No acute cervical adenopathy  Musculoskeletal: Normal gait, tone, without deformities  Psychiatric: Cooperative and appropriate    I&O's Detail    CARDIAC MARKERS ( 04 Oct 2018 06:42 )  x     / <0.01 ng/mL / x     / x     / x      CARDIAC MARKERS ( 04 Oct 2018 01:49 )  x     / <0.01 ng/mL / x     / x     / x                            15.6   6.9   )-----------( 165      ( 04 Oct 2018 06:42 )             49.0     04 Oct 2018 06:42    134    |  87     |  20.0   ----------------------------<  371    4.6     |  34.0   |  0.82     Ca    9.2        04 Oct 2018 06:42  Phos  4.6       04 Oct 2018 06:42  Mg     1.8       04 Oct 2018 06:42    TPro  7.0    /  Alb  4.2    /  TBili  0.5    /  DBili  x      /  AST  12     /  ALT  16     /  AlkPhos  68     04 Oct 2018 06:42    PT/INR - ( 04 Oct 2018 06:42 )   PT: 13.1 sec;   INR: 1.19 ratio       PTT - ( 04 Oct 2018 06:42 )  PTT:27.6 sec  CAPILLARY BLOOD GLUCOSE    POCT Blood Glucose.: 357 mg/dL (04 Oct 2018 13:38)  POCT Blood Glucose.: 351 mg/dL (04 Oct 2018 08:10)  POCT Blood Glucose.: 304 mg/dL (03 Oct 2018 22:50)    LIVER FUNCTIONS - ( 04 Oct 2018 06:42 )  Alb: 4.2 g/dL / Pro: 7.0 g/dL / ALK PHOS: 68 U/L / ALT: 16 U/L / AST: 12 U/L / GGT: x           Hemoglobin A1C, Whole Blood: 10.8 % (10-04-18 @ 06:42)    MEDICATIONS  (STANDING):  ALBUTerol/ipratropium for Nebulization 3 milliLiter(s) Nebulizer every 6 hours  ALPRAZolam 1 milliGRAM(s) Oral two times a day  aspirin enteric coated 81 milliGRAM(s) Oral daily  atorvastatin 40 milliGRAM(s) Oral at bedtime  clopidogrel Tablet 75 milliGRAM(s) Oral daily  dextrose 5%. 1000 milliLiter(s) (50 mL/Hr) IV Continuous <Continuous>  dextrose 50% Injectable 12.5 Gram(s) IV Push once  dextrose 50% Injectable 25 Gram(s) IV Push once  dextrose 50% Injectable 25 Gram(s) IV Push once  furosemide   Injectable 40 milliGRAM(s) IV Push every 12 hours  heparin  Injectable 5000 Unit(s) SubCutaneous every 8 hours  insulin lispro (HumaLOG) corrective regimen sliding scale   SubCutaneous three times a day before meals  levoFLOXacin  Tablet 750 milliGRAM(s) Oral every 24 hours  lisinopril 5 milliGRAM(s) Oral daily  methylPREDNISolone sodium succinate Injectable 40 milliGRAM(s) IV Push every 6 hours  metoprolol tartrate 12.5 milliGRAM(s) Oral two times a day  nicotine - 21 mG/24Hr(s) Patch 1 patch Transdermal daily  pantoprazole    Tablet 40 milliGRAM(s) Oral before breakfast    MEDICATIONS  (PRN):  acetaminophen   Tablet .. 650 milliGRAM(s) Oral every 6 hours PRN Temp greater or equal to 38C (100.4F), Moderate Pain (4 - 6)  dextrose 40% Gel 15 Gram(s) Oral once PRN Blood Glucose LESS THAN 70 milliGRAM(s)/deciliter  glucagon  Injectable 1 milliGRAM(s) IntraMuscular once PRN Glucose LESS THAN 70 milligrams/deciliter    RADIOLOGY & ADDITIONAL TESTS:  < from: Xray Chest 2 Views PA/Lat (10.03.18 @ 17:46) >  FINDINGS:       The lungs are hyperaerated with probable emphysematous changes. There is   no acute parenchymal consolidation.  There is no pleural effusion. The   cardiomediastinal silhouette is within normal limits.  There is thoracic   spondylosis.    IMPRESSION:  Clear lungs.    < end of copied text >    Rapid Respiratory Viral Panel (10.04.18 @ 01:38)    Rapid RVP Result: Detected: The Film[employer] RVP Rapid uses polymerase chain reaction (PCR) and melt  curve analysis to screen for adenovirus; coronavirus HKU1, NL63, 229E,  OC43; human metapneumovirus (hMPV); human enterovirus/rhinovirus  (Entero/RV); influenza A; influenza A/H1;influenza A/H3; influenza  A/H1-2009; influenza B; parainfluenza viruses 1, 2, 3, 4; respiratory  syncytial virus; Bordetella pertussis; Mycoplasma pneumoniae; and  Chlamydophila pneumoniae.    Entero/Rhinovirus (RapRVP): Detected CC: shortness of breath (04 Oct 2018 07:45)    HPI:  Pt is a 57 yo male with a pmh/o pulmonary HTN, GERD, Bipolar disorder, HTN, HLD, current everyday 0.5 PPD smoker DMII on insulin severe COPD/emphysema on home oxygen 2L (which his sister bought after cardiology in [place] recommended it after last hospitalization) and CHF with last exacerbation in 2015 with PCI intervention (states it was a balloon for a 95% occlusion of his right pulmonary artery?) for which he was hospitalized in [place], who presents today with complaints of worsening shortness of breath and congestion. Pt states he lives in a rented room in a home where everyone is sick with a cold. Pt reports waking up today to smoke after drinking nyquil for his congestion and dry cough and approx one hour later states he could not breathe despite using his oxygen. Pt denies any cp, palpitations, n/v/d, HA, fever, chills, lethargy, gu sx. Pt uses [facility] which is closed, states he is on a blood thinner that is not coumadin but does not know. Additional meds reconciled however pt does not know doses. (03 Oct 2018 22:29)    INTERVAL HPI/OVERNIGHT EVENTS:  Patient with worsening SOB over the last 2-3 days. Increased cough and wheeze. Today feeling better. Found to have +viral panel.     Vital Signs Last 24 Hrs  T(C): 36.7 (04 Oct 2018 13:39), Max: 37.3 (04 Oct 2018 04:16)  T(F): 98 (04 Oct 2018 13:39), Max: 99.1 (04 Oct 2018 04:16)  HR: 101 (04 Oct 2018 13:39) (84 - 118)  BP: 88/52 (04 Oct 2018 13:39) (88/52 - 188/84)  BP(mean): --  RR: 20 (04 Oct 2018 13:39) (20 - 24)  SpO2: 90% (04 Oct 2018 13:39) (90% - 98%)    PHYSICAL EXAM:  General: Well developed; well nourished; in no acute distress  Eyes: PERRLA, EOMI; conjunctiva and sclera clear  Head: Normocephalic; atraumatic  ENMT: No nasal discharge; airway clear; NC in place; oropharynx with some whitish plaque inside that removes easily concerning for thrush; no erythema in visible nasopharynx  Neck: Supple; non tender; no masses  Respiratory: wheeze more heard on the right compared to left. Patient also with bilateral crackles on exam that sound wet  Cardiovascular: slightly tachycardic rate with regular and rhythm. S1 and S2 Normal; No murmurs, gallops or rubs  Gastrointestinal: Soft non-tender non-distended; Normal bowel sounds  Genitourinary: No costovertebral angle tenderness  Extremities: Normal range of motion, No clubbing, cyanosis or edema  Vascular: Peripheral pulses palpable 2+ bilaterally  Neurological: Alert and oriented x4  Musculoskeletal: Normal tone, without deformities  Psychiatric: Cooperative and appropriate    I&O's Detail    CARDIAC MARKERS ( 04 Oct 2018 06:42 )  x     / <0.01 ng/mL / x     / x     / x      CARDIAC MARKERS ( 04 Oct 2018 01:49 )  x     / <0.01 ng/mL / x     / x     / x                            15.6   6.9   )-----------( 165      ( 04 Oct 2018 06:42 )             49.0     04 Oct 2018 06:42    134    |  87     |  20.0   ----------------------------<  371    4.6     |  34.0   |  0.82     Ca    9.2        04 Oct 2018 06:42  Phos  4.6       04 Oct 2018 06:42  Mg     1.8       04 Oct 2018 06:42    TPro  7.0    /  Alb  4.2    /  TBili  0.5    /  DBili  x      /  AST  12     /  ALT  16     /  AlkPhos  68     04 Oct 2018 06:42    PT/INR - ( 04 Oct 2018 06:42 )   PT: 13.1 sec;   INR: 1.19 ratio       PTT - ( 04 Oct 2018 06:42 )  PTT:27.6 sec  CAPILLARY BLOOD GLUCOSE    POCT Blood Glucose.: 357 mg/dL (04 Oct 2018 13:38)  POCT Blood Glucose.: 351 mg/dL (04 Oct 2018 08:10)  POCT Blood Glucose.: 304 mg/dL (03 Oct 2018 22:50)    LIVER FUNCTIONS - ( 04 Oct 2018 06:42 )  Alb: 4.2 g/dL / Pro: 7.0 g/dL / ALK PHOS: 68 U/L / ALT: 16 U/L / AST: 12 U/L / GGT: x           Hemoglobin A1C, Whole Blood: 10.8 % (10-04-18 @ 06:42)    MEDICATIONS  (STANDING):  ALBUTerol/ipratropium for Nebulization 3 milliLiter(s) Nebulizer every 6 hours  ALPRAZolam 1 milliGRAM(s) Oral two times a day  aspirin enteric coated 81 milliGRAM(s) Oral daily  atorvastatin 40 milliGRAM(s) Oral at bedtime  clopidogrel Tablet 75 milliGRAM(s) Oral daily  dextrose 5%. 1000 milliLiter(s) (50 mL/Hr) IV Continuous <Continuous>  dextrose 50% Injectable 12.5 Gram(s) IV Push once  dextrose 50% Injectable 25 Gram(s) IV Push once  dextrose 50% Injectable 25 Gram(s) IV Push once  furosemide   Injectable 40 milliGRAM(s) IV Push every 12 hours  heparin  Injectable 5000 Unit(s) SubCutaneous every 8 hours  insulin lispro (HumaLOG) corrective regimen sliding scale   SubCutaneous three times a day before meals  levoFLOXacin  Tablet 750 milliGRAM(s) Oral every 24 hours  lisinopril 5 milliGRAM(s) Oral daily  methylPREDNISolone sodium succinate Injectable 40 milliGRAM(s) IV Push every 6 hours  metoprolol tartrate 12.5 milliGRAM(s) Oral two times a day  nicotine - 21 mG/24Hr(s) Patch 1 patch Transdermal daily  pantoprazole    Tablet 40 milliGRAM(s) Oral before breakfast    MEDICATIONS  (PRN):  acetaminophen   Tablet .. 650 milliGRAM(s) Oral every 6 hours PRN Temp greater or equal to 38C (100.4F), Moderate Pain (4 - 6)  dextrose 40% Gel 15 Gram(s) Oral once PRN Blood Glucose LESS THAN 70 milliGRAM(s)/deciliter  glucagon  Injectable 1 milliGRAM(s) IntraMuscular once PRN Glucose LESS THAN 70 milligrams/deciliter    RADIOLOGY & ADDITIONAL TESTS:  < from: Xray Chest 2 Views PA/Lat (10.03.18 @ 17:46) >  FINDINGS:       The lungs are hyperaerated with probable emphysematous changes. There is   no acute parenchymal consolidation.  There is no pleural effusion. The   cardiomediastinal silhouette is within normal limits.  There is thoracic   spondylosis.    IMPRESSION:  Clear lungs.    < end of copied text >    Rapid Respiratory Viral Panel (10.04.18 @ 01:38)    Rapid RVP Result: Detected: The Film[employer] RVP Rapid uses polymerase chain reaction (PCR) and melt  curve analysis to screen for adenovirus; coronavirus HKU1, NL63, 229E,  OC43; human metapneumovirus (hMPV); human enterovirus/rhinovirus  (Entero/RV); influenza A; influenza A/H1;influenza A/H3; influenza  A/H1-2009; influenza B; parainfluenza viruses 1, 2, 3, 4; respiratory  syncytial virus; Bordetella pertussis; Mycoplasma pneumoniae; and  Chlamydophila pneumoniae.    Entero/Rhinovirus (RapRVP): Detected

## 2018-10-04 NOTE — CONSULT NOTE ADULT - SUBJECTIVE AND OBJECTIVE BOX
[place] [facility]                                                        Office: [address] [address]                                                       Telephone: [phone]. Fax:[phone]                                                              CARDIOLOGY CONSULTATION NOTE                                                                                               Chief complaint: shortness of breath (03 Oct 2018 22:29)      HPI:  Patient is a 57 yo M presents to [facility] with complaints of worsening shortness of breath and upper respiratory congestion. States that he lives in a home where everyone is sick.  Patient reports smoking 1/2 pack of cigarettes daily for the past 40 years.  Today he was outside smoking after waking up.  Because of his congestion and dry cough he also had Nyquil and about an hour later he could not breathe despite using his oxygen.  Tried inhalers without any positive resolution.  Around 3pm he called 911 and was brought to the hospital ER for evaluation.  Reports having cardiac w/o in [place] 3 years ago and treated for HF.  Currently, sitting up in bed and denies CP, palpitations, N/V, diaphoresis, HA, fever, chills, or back pain. Admits to minor leg swelling and SOB at rest.      REVIEW OF SYMPTOMS:   General: + home O2, + SOB  Cardiovascular:  denies chest pain, + dyspnea, denies syncope, palpitations, dizziness, + Orthopnea, + Paroxsymal nocturnal dyspnea;  Respiratory:  + Dyspnea, + cough, denies fever, chills and recent travel     Genitourinary: denies dysuria, hematuria;   Gastrointestinal: denies nausea, vomiting, diarrhea, abdominal pain  Neurological: denies headache, dizziness, slurred speech;    Psychiatric: No agitation, no anxiety.  ALL OTHER REVIEW OF SYSTEMS ARE NEGATIVE.    ALLERGIES: No Known Allergies    CURRENT MEDICATIONS:  Metoprolol tartrate 12.5 milliGRAM(s) Oral two times a day  Protonix 40 mg oral delayed release tablet: 1 tab(s) orally once a day  Lipitor:   GlipiZIDE:   Lantus 100 units/mL subcutaneous solution: 8 unit(s) subcutaneous 2 times a day  Aspirin 81:   Plavix 75 mg oral tablet: 1 tab(s) orally once a day  Xanax: 1 milligram(s) orally 2 times a day  Lisinopril 5 mg oral tablet: 1 tab(s) orally once a day    PAST MEDICAL HISTORY  Oxygen dependent  Gastroesophageal reflux disease, esophagitis presence not specified  Smoker  Bipolar 1 disorder  Coronary artery disease involving native coronary artery of native heart without angina pectoris  Type 2 diabetes mellitus without complication, with long-term current use of insulin  Pulmonary HTN  HLD (hyperlipidemia)  Stented coronary artery  COPD (chronic obstructive pulmonary disease)    PAST SURGICAL HISTORY  No significant past surgical history    FAMILY HISTORY:  No family history of mental disorder (Father, Mother)  No family history of hypertension (Father, Mother)  No family history of chronic obstructive pulmonary disease (Father, Mother)  No family history of cardiovascular disease (Father, Mother)    SOCIAL HISTORY:   CIGARETTES:   1/2 pack daily x 40 years   ALCOHOL: Denies  DRUGS:Denies    Vital Signs Last 24 Hrs  T(C): 37.2 (03 Oct 2018 15:43), Max: 37.2 (03 Oct 2018 15:43)  T(F): 98.9 (03 Oct 2018 15:43), Max: 98.9 (03 Oct 2018 15:43)  HR: 111 (03 Oct 2018 21:59) (84 - 112)  BP: 103/71 (03 Oct 2018 21:59) (103/71 - 188/84)  BP(mean): --  RR: 24 (03 Oct 2018 21:59) (20 - 24)  SpO2: 91% (03 Oct 2018 21:59) (91% - 98%)    PHYSICAL EXAM:  Constitutional: + distress due to SOB and cough   HEENT: Atraumatic, EOMI, neck is supple no JVD  CNS: A & Ox3, No focal deficits, Cranial nerves II-IX are intact.   Respiratory: respirations labored; + rales and rhonchi throughout with occ. expiratory wheezing  Cardiovascular: S1S2, Tachycardic, w/o murmur  Gastrointestinal: Soft non-tender and non distended, +Bowel sounds  Extremities: Trace b/l LE edema,    Psychiatric: + mild respiratory distress  Skin: + spoon nails, face with erythema but no ulcers visualized to face, hands or feet    Intake and output:     LABS:                        15.8   8.9   )-----------( 148      ( 03 Oct 2018 19:44 )             51.4     134<L>  |  90<L>  |  15.0  ----------------------------<  259<H>  5.0   |  35.0<H>  |  0.64    Ca    9.4      03 Oct 2018 19:44    TPro  7.3  /  Alb  4.5  /  TBili  0.5  /  DBili  x   /  AST  15  /  ALT  17  /  AlkPhos  70  10-03    Serum Pro-Brain Natriuretic Peptide: 1242 pg/mL (10-03 @ 19:44)    PT: 12.2 sec;   INR: 1.11 ratio    PTT:37.7 sec    ECG: Sinus Tachycardia at 112bpm, RBBB,     RADIOLOGY & ADDITIONAL STUDIES:   X-ray: [place] [facility]                                                        Office: [address] [address]                                                       Telephone: [phone]. Fax:[phone]                                                              CARDIOLOGY CONSULTATION NOTE                                                                                               Chief complaint: shortness of breath (03 Oct 2018 22:29)      HPI:  Patient is a 57 yo M presents to [facility] with complaints of worsening shortness of breath and upper respiratory congestion. States that he lives in a home where everyone is sick.  Patient reports smoking 1/2 pack of cigarettes daily for the past 40 years.  Today he was outside smoking after waking up.  Because of his congestion and dry cough he also had Nyquil and about an hour later he could not breathe despite using his oxygen.  Tried inhalers without any positive resolution.  Around 3pm he called 911 and was brought to the hospital ER for evaluation.  Reports having cardiac w/o in [place] 3 years ago and treated for HF.  s/p PCI to RCA in 2016 by [place] Patrice Belle [place]. Currently, sitting up in bed and denies CP, palpitations, N/V, diaphoresis, HA, fever, chills, or back pain. Admits to minor leg swelling and SOB at rest.      REVIEW OF SYMPTOMS:   General: + home O2, + SOB  Cardiovascular:  denies chest pain, + dyspnea, denies syncope, palpitations, dizziness, + Orthopnea, + Paroxsymal nocturnal dyspnea;  Respiratory:  + Dyspnea, + cough, denies fever, chills and recent travel     Genitourinary: denies dysuria, hematuria;   Gastrointestinal: denies nausea, vomiting, diarrhea, abdominal pain  Neurological: denies headache, dizziness, slurred speech;    Psychiatric: No agitation, no anxiety.  ALL OTHER REVIEW OF SYSTEMS ARE NEGATIVE.    ALLERGIES: No Known Allergies    CURRENT MEDICATIONS:  Metoprolol tartrate 12.5 milliGRAM(s) Oral two times a day  Protonix 40 mg oral delayed release tablet: 1 tab(s) orally once a day  Lipitor:   GlipiZIDE:   Lantus 100 units/mL subcutaneous solution: 8 unit(s) subcutaneous 2 times a day  Aspirin 81:   Plavix 75 mg oral tablet: 1 tab(s) orally once a day  Xanax: 1 milligram(s) orally 2 times a day  Lisinopril 5 mg oral tablet: 1 tab(s) orally once a day    PAST MEDICAL HISTORY  Oxygen dependent  Gastroesophageal reflux disease, esophagitis presence not specified  Smoker  Bipolar 1 disorder  Coronary artery disease involving native coronary artery of native heart without angina pectoris  Type 2 diabetes mellitus without complication, with long-term current use of insulin  Pulmonary HTN  HLD (hyperlipidemia)  Stented coronary artery  COPD (chronic obstructive pulmonary disease)    PAST SURGICAL HISTORY  No significant past surgical history    FAMILY HISTORY:  No family history of mental disorder (Father, Mother)  No family history of hypertension (Father, Mother)  No family history of chronic obstructive pulmonary disease (Father, Mother)  No family history of cardiovascular disease (Father, Mother)    SOCIAL HISTORY:   CIGARETTES:   1/2 pack daily x 40 years   ALCOHOL: Denies  DRUGS:Denies    Vital Signs Last 24 Hrs  T(C): 37.2 (03 Oct 2018 15:43), Max: 37.2 (03 Oct 2018 15:43)  T(F): 98.9 (03 Oct 2018 15:43), Max: 98.9 (03 Oct 2018 15:43)  HR: 111 (03 Oct 2018 21:59) (84 - 112)  BP: 103/71 (03 Oct 2018 21:59) (103/71 - 188/84)  BP(mean): --  RR: 24 (03 Oct 2018 21:59) (20 - 24)  SpO2: 91% (03 Oct 2018 21:59) (91% - 98%)    PHYSICAL EXAM:  Constitutional: + distress due to SOB and cough   HEENT: Atraumatic, EOMI, neck is supple no JVD  CNS: A & Ox3, No focal deficits, Cranial nerves II-IX are intact.   Respiratory: respirations labored; + rales and rhonchi throughout with occ. expiratory wheezing  Cardiovascular: S1S2, Tachycardic, w/o murmur  Gastrointestinal: Soft non-tender and non distended, +Bowel sounds  Extremities: Trace b/l LE edema,    Psychiatric: + mild respiratory distress  Skin: + spoon nails, face with erythema but no ulcers visualized to face, hands or feet    Intake and output:     LABS:                        15.8   8.9   )-----------( 148      ( 03 Oct 2018 19:44 )             51.4     134<L>  |  90<L>  |  15.0  ----------------------------<  259<H>  5.0   |  35.0<H>  |  0.64    Ca    9.4      03 Oct 2018 19:44    TPro  7.3  /  Alb  4.5  /  TBili  0.5  /  DBili  x   /  AST  15  /  ALT  17  /  AlkPhos  70  10-03    Serum Pro-Brain Natriuretic Peptide: 1242 pg/mL (10-03 @ 19:44)    PT: 12.2 sec;   INR: 1.11 ratio    PTT:37.7 sec    ECG: Sinus Tachycardia at 112bpm, RBBB,     RADIOLOGY & ADDITIONAL STUDIES:   X-ray:

## 2018-10-04 NOTE — CONSULT NOTE ADULT - SUBJECTIVE AND OBJECTIVE BOX
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 Pt states he lives in a rented room in a home where everyone is sick with a cold. Pt reports waking up today to smoke after drinking nyquil for his congestion and dry cough and approx one hour later states he could not breathe despite using his oxygen. Pt denies any cp, palpitations, n/v/d, HA, fever, chills, lethargy, gu sx. Pt uses [facility] which is closed, states he is on a blood thinner that is not coumadin but does not know. Additional meds reconciled however pt does not know doses    MEDICATIONS  (STANDING):  ALBUTerol/ipratropium for Nebulization 3 milliLiter(s) Nebulizer every 6 hours  ALPRAZolam 1 milliGRAM(s) Oral two times a day  aspirin enteric coated 81 milliGRAM(s) Oral daily  atorvastatin 40 milliGRAM(s) Oral at bedtime  clopidogrel Tablet 75 milliGRAM(s) Oral daily  dextrose 5%. 1000 milliLiter(s) (50 mL/Hr) IV Continuous <Continuous>  dextrose 50% Injectable 12.5 Gram(s) IV Push once  dextrose 50% Injectable 25 Gram(s) IV Push once  dextrose 50% Injectable 25 Gram(s) IV Push once  furosemide   Injectable 40 milliGRAM(s) IV Push every 12 hours  heparin  Injectable 5000 Unit(s) SubCutaneous every 8 hours  insulin lispro (HumaLOG) corrective regimen sliding scale   SubCutaneous three times a day before meals  levoFLOXacin  Tablet 750 milliGRAM(s) Oral every 24 hours  lisinopril 5 milliGRAM(s) Oral daily  methylPREDNISolone sodium succinate Injectable 40 milliGRAM(s) IV Push every 6 hours  metoprolol tartrate 12.5 milliGRAM(s) Oral two times a day  nicotine - 21 mG/24Hr(s) Patch 1 patch Transdermal daily  pantoprazole    Tablet 40 milliGRAM(s) Oral before breakfast      MEDICATIONS  (PRN):  acetaminophen   Tablet .. 650 milliGRAM(s) Oral every 6 hours PRN Temp greater or equal to 38C (100.4F), Moderate Pain (4 - 6)  dextrose 40% Gel 15 Gram(s) Oral once PRN Blood Glucose LESS THAN 70 milliGRAM(s)/deciliter  glucagon  Injectable 1 milliGRAM(s) IntraMuscular once PRN Glucose LESS THAN 70 milligrams/deciliter      Allergies    No Known Allergies    Intolerances        PAST MEDICAL & SURGICAL HISTORY:  Oxygen dependent  Gastroesophageal reflux disease, esophagitis presence not specified  Smoker  Bipolar 1 disorder  Coronary artery disease involving native coronary artery of native heart without angina pectoris  Type 2 diabetes mellitus without complication, with long-term current use of insulin  Pulmonary HTN  HLD (hyperlipidemia)  Stented coronary artery  COPD (chronic obstructive pulmonary disease)  No significant past surgical history      FAMILY HISTORY:  No family history of mental disorder (Father, Mother)  No family history of hypertension (Father, Mother)  No family history of chronic obstructive pulmonary disease (Father, Mother)  No family history of cardiovascular disease (Father, Mother)      SOCIAL HISTORY  Smoking History:     REVIEW OF SYSTEMS:    CONSTITUTIONAL:  No fevers, chills, sweats    HEENT:  Eyes:  No diplopia or blurred vision. ENT:  No earache, sore throat or runny nose. sinus headache or postnasl drip    CARDIOVASCULAR:  No pressure, squeezing, tightness, or heaviness about the chest; no palpitations, leg swelling, orthopnea or PND    RESPIRATORY:  No cough, shortness of breath, PND or orthopnea. Mild SOBOE    GASTROINTESTINAL:  No abdominal pain, nausea, vomiting or diarrhea.    GENITOURINARY:  No dysuria, frequency or urgency.    NEUROLOGIC:  No paresthesias, fasciculations, seizures or weakness.    PSYCHIATRIC:  No disorder of thought or mood.    Vital Signs Last 24 Hrs  T(C): 37.3 (04 Oct 2018 04:16), Max: 37.3 (04 Oct 2018 04:16)  T(F): 99.1 (04 Oct 2018 04:16), Max: 99.1 (04 Oct 2018 04:16)  HR: 110 (04 Oct 2018 04:16) (84 - 118)  BP: 102/61 (04 Oct 2018 04:16) (102/61 - 188/84)  BP(mean): --  RR: 20 (04 Oct 2018 04:16) (20 - 24)  SpO2: 91% (04 Oct 2018 04:16) (91% - 98%)    PHYSICAL EXAMINATION:    GENERAL: The patient is a well-developed, well-nourished _____in no apparent distress.     HEENT: Head is normocephalic and atraumatic. Extraocular muscles are intact. Mucous membranes are moist.     NECK: Supple.     LUNGS: Clear to auscultation without wheezing, rales, or rhonchi. Respirations unlabored    HEART: Regular rate and rhythm without murmur.    ABDOMEN: Soft, nontender, and nondistended.  No hepatosplenomegaly is noted.    EXTREMITIES: Without any cyanosis, clubbing, rash, lesions or edema.    NEUROLOGIC: Grossly intact.      LABS:                        15.6   6.9   )-----------( 165      ( 04 Oct 2018 06:42 )             49.0     10-04    134<L>  |  87<L>  |  20.0  ----------------------------<  371<H>  4.6   |  34.0<H>  |  0.82    Ca    9.2      04 Oct 2018 06:42  Phos  4.6     10-04  Mg     1.8     10-04    TPro  7.0  /  Alb  4.2  /  TBili  0.5  /  DBili  x   /  AST  12  /  ALT  16  /  AlkPhos  68  10-04    PT/INR - ( 04 Oct 2018 06:42 )   PT: 13.1 sec;   INR: 1.19 ratio         PTT - ( 04 Oct 2018 06:42 )  PTT:27.6 sec    ABG - ( 03 Oct 2018 23:44 )  pH, Arterial: 7.34  pH, Blood: x     /  pCO2: 69    /  pO2: 57    / HCO3: 32    / Base Excess: 8.5   /  SaO2: 92                CARDIAC MARKERS ( 04 Oct 2018 06:42 )  x     / <0.01 ng/mL / x     / x     / x      CARDIAC MARKERS ( 04 Oct 2018 01:49 )  x     / <0.01 ng/mL / x     / x     / x            Serum Pro-Brain Natriuretic Peptide: 1258 pg/mL (10-04-18 @ 06:42)  Serum Pro-Brain Natriuretic Peptide: 1242 pg/mL (10-03-18 @ 19:44)          MICROBIOLOGY:    RADIOLOGY & ADDITIONAL STUDIES: PULMONARY CONSULT NOTE      DEVIKA CARBALLOANKIT-[id number]    Patient is a 58y old  Male who presents with a chief complaint of shortness of breath (04 Oct 2018 00:52)      HISTORY OF PRESENT ILLNESS:    Pt is a 57 yo male with a pmh/o pulmonary HTN, GERD, Bipolar disorder, HTN, HLD, current everyday 0.5 PPD smoker DMII on insulin 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 Pt states he lives in a rented room in a home where everyone is sick with a cold. Pt reports waking up today to smoke after drinking nyquil for his congestion and dry cough and approx one hour later states he could not breathe despite using his oxygen. Pt denies any cp, palpitations, n/v/d, HA, fever, chills, lethargy, gu sx. Pt uses [facility] pharmacy which is closed, states he is on a blood thinner that is not coumadin but does not know. Additional meds reconciled however pt does not know doses    MEDICATIONS  (STANDING):  ALBUTerol/ipratropium for Nebulization 3 milliLiter(s) Nebulizer every 6 hours  ALPRAZolam 1 milliGRAM(s) Oral two times a day  aspirin enteric coated 81 milliGRAM(s) Oral daily  atorvastatin 40 milliGRAM(s) Oral at bedtime  clopidogrel Tablet 75 milliGRAM(s) Oral daily  dextrose 5%. 1000 milliLiter(s) (50 mL/Hr) IV Continuous <Continuous>  dextrose 50% Injectable 12.5 Gram(s) IV Push once  dextrose 50% Injectable 25 Gram(s) IV Push once  dextrose 50% Injectable 25 Gram(s) IV Push once  furosemide   Injectable 40 milliGRAM(s) IV Push every 12 hours  heparin  Injectable 5000 Unit(s) SubCutaneous every 8 hours  insulin lispro (HumaLOG) corrective regimen sliding scale   SubCutaneous three times a day before meals  levoFLOXacin  Tablet 750 milliGRAM(s) Oral every 24 hours  lisinopril 5 milliGRAM(s) Oral daily  methylPREDNISolone sodium succinate Injectable 40 milliGRAM(s) IV Push every 6 hours  metoprolol tartrate 12.5 milliGRAM(s) Oral two times a day  nicotine - 21 mG/24Hr(s) Patch 1 patch Transdermal daily  pantoprazole    Tablet 40 milliGRAM(s) Oral before breakfast      MEDICATIONS  (PRN):  acetaminophen   Tablet .. 650 milliGRAM(s) Oral every 6 hours PRN Temp greater or equal to 38C (100.4F), Moderate Pain (4 - 6)  dextrose 40% Gel 15 Gram(s) Oral once PRN Blood Glucose LESS THAN 70 milliGRAM(s)/deciliter  glucagon  Injectable 1 milliGRAM(s) IntraMuscular once PRN Glucose LESS THAN 70 milligrams/deciliter      Allergies    No Known Allergies    Intolerances        PAST MEDICAL & SURGICAL HISTORY:  Oxygen dependent  Gastroesophageal reflux disease, esophagitis presence not specified  Smoker  Bipolar 1 disorder  Coronary artery disease involving native coronary artery of native heart without angina pectoris  Type 2 diabetes mellitus without complication, with long-term current use of insulin  Pulmonary HTN  HLD (hyperlipidemia)  Stented coronary artery  COPD (chronic obstructive pulmonary disease)  No significant past surgical history      FAMILY HISTORY:  No family history of mental disorder (Father, Mother)  No family history of hypertension (Father, Mother)  No family history of chronic obstructive pulmonary disease (Father, Mother)  No family history of cardiovascular disease (Father, Mother)      SOCIAL HISTORY  Smoking History:     REVIEW OF SYSTEMS:    CONSTITUTIONAL:  No fevers, chills, sweats    HEENT:  Eyes:  No diplopia or blurred vision. ENT:  No earache, sore throat or runny nose. sinus headache or postnasl drip    CARDIOVASCULAR:  No pressure, squeezing, tightness, or heaviness about the chest; no palpitations, leg swelling, orthopnea or PND    RESPIRATORY:  No cough, shortness of breath, PND or orthopnea. Mild SOBOE    GASTROINTESTINAL:  No abdominal pain, nausea, vomiting or diarrhea.    GENITOURINARY:  No dysuria, frequency or urgency.    NEUROLOGIC:  No paresthesias, fasciculations, seizures or weakness.    PSYCHIATRIC:  No disorder of thought or mood.    Vital Signs Last 24 Hrs  T(C): 37.3 (04 Oct 2018 04:16), Max: 37.3 (04 Oct 2018 04:16)  T(F): 99.1 (04 Oct 2018 04:16), Max: 99.1 (04 Oct 2018 04:16)  HR: 110 (04 Oct 2018 04:16) (84 - 118)  BP: 102/61 (04 Oct 2018 04:16) (102/61 - 188/84)  BP(mean): --  RR: 20 (04 Oct 2018 04:16) (20 - 24)  SpO2: 91% (04 Oct 2018 04:16) (91% - 98%)    PHYSICAL EXAMINATION:    GENERAL: The patient is a well-developed, well-nourished _____in no apparent distress.     HEENT: Head is normocephalic and atraumatic. Extraocular muscles are intact. Mucous membranes are moist.     NECK: Supple.     LUNGS: Clear to auscultation without wheezing, rales, or rhonchi. Respirations unlabored    HEART: Regular rate and rhythm without murmur.    ABDOMEN: Soft, nontender, and nondistended.  No hepatosplenomegaly is noted.    EXTREMITIES: Without any cyanosis, clubbing, rash, lesions or edema.    NEUROLOGIC: Grossly intact.      LABS:                        15.6   6.9   )-----------( 165      ( 04 Oct 2018 06:42 )             49.0     10-04    134<L>  |  87<L>  |  20.0  ----------------------------<  371<H>  4.6   |  34.0<H>  |  0.82    Ca    9.2      04 Oct 2018 06:42  Phos  4.6     10-04  Mg     1.8     10-04    TPro  7.0  /  Alb  4.2  /  TBili  0.5  /  DBili  x   /  AST  12  /  ALT  16  /  AlkPhos  68  10-04    PT/INR - ( 04 Oct 2018 06:42 )   PT: 13.1 sec;   INR: 1.19 ratio         PTT - ( 04 Oct 2018 06:42 )  PTT:27.6 sec    ABG - ( 03 Oct 2018 23:44 )  pH, Arterial: 7.34  pH, Blood: x     /  pCO2: 69    /  pO2: 57    / HCO3: 32    / Base Excess: 8.5   /  SaO2: 92                CARDIAC MARKERS ( 04 Oct 2018 06:42 )  x     / <0.01 ng/mL / x     / x     / x      CARDIAC MARKERS ( 04 Oct 2018 01:49 )  x     / <0.01 ng/mL / x     / x     / x            Serum Pro-Brain Natriuretic Peptide: 1258 pg/mL (10-04-18 @ 06:42)  Serum Pro-Brain Natriuretic Peptide: 1242 pg/mL (10-03-18 @ 19:44)          MICROBIOLOGY:    RADIOLOGY & ADDITIONAL STUDIES: PULMONARY CONSULT NOTE      DEVIKA CARBALLOANKIT-[id number]    Patient is a 58y old  Male who presents with a chief complaint of shortness of breath (04 Oct 2018 00:52)      HISTORY OF PRESENT ILLNESS:    Pt is a 59 yo male with a pmh/o pulmonary HTN, GERD, Bipolar disorder, HTN, HLD, current everyday 0.5 PPD smoker DMII on insulin 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 Pt states he lives in a rented room in a home where everyone is sick with a cold. Pt reports waking up today to smoke after drinking nyquil for his congestion and dry cough and approx one hour later states he could not breathe despite using his oxygen. Pt denies any cp, palpitations, n/v/d, HA, fever, chills, lethargy, gu sx. Pt uses [facility] pharmacy which is closed, states he is on a blood thinner that is not coumadin but does not know. Additional meds reconciled however pt does not know doses    MEDICATIONS  (STANDING):  ALBUTerol/ipratropium for Nebulization 3 milliLiter(s) Nebulizer every 6 hours  ALPRAZolam 1 milliGRAM(s) Oral two times a day  aspirin enteric coated 81 milliGRAM(s) Oral daily  atorvastatin 40 milliGRAM(s) Oral at bedtime  clopidogrel Tablet 75 milliGRAM(s) Oral daily  dextrose 5%. 1000 milliLiter(s) (50 mL/Hr) IV Continuous <Continuous>  dextrose 50% Injectable 12.5 Gram(s) IV Push once  dextrose 50% Injectable 25 Gram(s) IV Push once  dextrose 50% Injectable 25 Gram(s) IV Push once  furosemide   Injectable 40 milliGRAM(s) IV Push every 12 hours  heparin  Injectable 5000 Unit(s) SubCutaneous every 8 hours  insulin lispro (HumaLOG) corrective regimen sliding scale   SubCutaneous three times a day before meals  levoFLOXacin  Tablet 750 milliGRAM(s) Oral every 24 hours  lisinopril 5 milliGRAM(s) Oral daily  methylPREDNISolone sodium succinate Injectable 40 milliGRAM(s) IV Push every 6 hours  metoprolol tartrate 12.5 milliGRAM(s) Oral two times a day  nicotine - 21 mG/24Hr(s) Patch 1 patch Transdermal daily  pantoprazole    Tablet 40 milliGRAM(s) Oral before breakfast      MEDICATIONS  (PRN):  acetaminophen   Tablet .. 650 milliGRAM(s) Oral every 6 hours PRN Temp greater or equal to 38C (100.4F), Moderate Pain (4 - 6)  dextrose 40% Gel 15 Gram(s) Oral once PRN Blood Glucose LESS THAN 70 milliGRAM(s)/deciliter  glucagon  Injectable 1 milliGRAM(s) IntraMuscular once PRN Glucose LESS THAN 70 milligrams/deciliter      Allergies    No Known Allergies    Intolerances        PAST MEDICAL & SURGICAL HISTORY:  Oxygen dependent  Gastroesophageal reflux disease, esophagitis presence not specified  Smoker  Bipolar 1 disorder  Coronary artery disease involving native coronary artery of native heart without angina pectoris  Type 2 diabetes mellitus without complication, with long-term current use of insulin  Pulmonary HTN  HLD (hyperlipidemia)  Stented coronary artery  COPD (chronic obstructive pulmonary disease)  No significant past surgical history      FAMILY HISTORY:  No family history of mental disorder (Father, Mother)  No family history of hypertension (Father, Mother)  No family history of chronic obstructive pulmonary disease (Father, Mother)  No family history of cardiovascular disease (Father, Mother)      SOCIAL HISTORY  Smoking History:     REVIEW OF SYSTEMS:    CONSTITUTIONAL:  No fevers, chills, sweats    HEENT:  Eyes:  No diplopia or blurred vision. ENT:  No earache, sore throat or runny nose. sinus headache or postnasl drip    CARDIOVASCULAR:  No pressure, squeezing, tightness, or heaviness about the chest; no palpitations, leg swelling, orthopnea or PND    RESPIRATORY:  No cough, shortness of breath, PND or orthopnea. Mild SOBOE    GASTROINTESTINAL:  No abdominal pain, nausea, vomiting or diarrhea.    GENITOURINARY:  No dysuria, frequency or urgency.    NEUROLOGIC:  No paresthesias, fasciculations, seizures or weakness.    PSYCHIATRIC:  No disorder of thought or mood.    Vital Signs Last 24 Hrs  T(C): 37.3 (04 Oct 2018 04:16), Max: 37.3 (04 Oct 2018 04:16)  T(F): 99.1 (04 Oct 2018 04:16), Max: 99.1 (04 Oct 2018 04:16)  HR: 110 (04 Oct 2018 04:16) (84 - 118)  BP: 102/61 (04 Oct 2018 04:16) (102/61 - 188/84)  BP(mean): --  RR: 20 (04 Oct 2018 04:16) (20 - 24)  SpO2: 91% (04 Oct 2018 04:16) (91% - 98%)    PHYSICAL EXAMINATION:    GENERAL: The patient is a well-developed, well-nourished _____in no apparent distress.     HEENT: Head is normocephalic and atraumatic. Extraocular muscles are intact. Mucous membranes are moist.     NECK: Supple.     LUNGS: Clear to auscultation without wheezing, rales, or rhonchi. Respirations unlabored    HEART: Regular rate and rhythm without murmur.    ABDOMEN: Soft, nontender, and nondistended.  No hepatosplenomegaly is noted.    EXTREMITIES: Without any cyanosis, clubbing, rash, lesions or edema.    NEUROLOGIC: Grossly intact.      LABS:                        15.6   6.9   )-----------( 165      ( 04 Oct 2018 06:42 )             49.0     10-04    134<L>  |  87<L>  |  20.0  ----------------------------<  371<H>  4.6   |  34.0<H>  |  0.82    Ca    9.2      04 Oct 2018 06:42  Phos  4.6     10-04  Mg     1.8     10-04    TPro  7.0  /  Alb  4.2  /  TBili  0.5  /  DBili  x   /  AST  12  /  ALT  16  /  AlkPhos  68  10-04    PT/INR - ( 04 Oct 2018 06:42 )   PT: 13.1 sec;   INR: 1.19 ratio         PTT - ( 04 Oct 2018 06:42 )  PTT:27.6 sec    ABG - ( 03 Oct 2018 23:44 )  pH, Arterial: 7.34  pH, Blood: x     /  pCO2: 69    /  pO2: 57    / HCO3: 32    / Base Excess: 8.5   /  SaO2: 92                CARDIAC MARKERS ( 04 Oct 2018 06:42 )  x     / <0.01 ng/mL / x     / x     / x      CARDIAC MARKERS ( 04 Oct 2018 01:49 )  x     / <0.01 ng/mL / x     / x     / x            Serum Pro-Brain Natriuretic Peptide: 1258 pg/mL (10-04-18 @ 06:42)  Serum Pro-Brain Natriuretic Peptide: 1242 pg/mL (10-03-18 @ 19:44)          MICROBIOLOGY:    RADIOLOGY & ADDITIONAL STUDIES: PULMONARY CONSULT NOTE      DEVIKA CARBALLOANKIT-[id number]    Patient is a 58y old  Male who presents with a chief complaint of shortness of breath (04 Oct 2018 00:52)      HISTORY OF PRESENT ILLNESS:    Pt is a 59 yo male with a pmh/o pulmonary HTN, GERD, Bipolar disorder, HTN, HLD, current everyday 0.5 PPD smoker DMII on insulin 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 Pt states he lives in a rented room in a home where everyone is sick with a cold. Pt reports waking up today to smoke after drinking nyquil for his congestion and dry cough and approx one hour later states he could not breathe despite using his oxygen. Pt denies any cp, palpitations, n/v/d, HA, fever, chills, lethargy, gu sx. Pt uses [facility] pharmacy which is closed, states he is on a blood thinner that is not coumadin but does not know. Additional meds reconciled however pt does not know doses    Hx stage 4 COPD last seen by me 2013. Pt had symbicort until last week then ran out    MEDICATIONS  (STANDING):  ALBUTerol/ipratropium for Nebulization 3 milliLiter(s) Nebulizer every 6 hours  ALPRAZolam 1 milliGRAM(s) Oral two times a day  aspirin enteric coated 81 milliGRAM(s) Oral daily  atorvastatin 40 milliGRAM(s) Oral at bedtime  clopidogrel Tablet 75 milliGRAM(s) Oral daily  dextrose 5%. 1000 milliLiter(s) (50 mL/Hr) IV Continuous <Continuous>  dextrose 50% Injectable 12.5 Gram(s) IV Push once  dextrose 50% Injectable 25 Gram(s) IV Push once  dextrose 50% Injectable 25 Gram(s) IV Push once  furosemide   Injectable 40 milliGRAM(s) IV Push every 12 hours  heparin  Injectable 5000 Unit(s) SubCutaneous every 8 hours  insulin lispro (HumaLOG) corrective regimen sliding scale   SubCutaneous three times a day before meals  levoFLOXacin  Tablet 750 milliGRAM(s) Oral every 24 hours  lisinopril 5 milliGRAM(s) Oral daily  methylPREDNISolone sodium succinate Injectable 40 milliGRAM(s) IV Push every 6 hours  metoprolol tartrate 12.5 milliGRAM(s) Oral two times a day  nicotine - 21 mG/24Hr(s) Patch 1 patch Transdermal daily  pantoprazole    Tablet 40 milliGRAM(s) Oral before breakfast      MEDICATIONS  (PRN):  acetaminophen   Tablet .. 650 milliGRAM(s) Oral every 6 hours PRN Temp greater or equal to 38C (100.4F), Moderate Pain (4 - 6)  dextrose 40% Gel 15 Gram(s) Oral once PRN Blood Glucose LESS THAN 70 milliGRAM(s)/deciliter  glucagon  Injectable 1 milliGRAM(s) IntraMuscular once PRN Glucose LESS THAN 70 milligrams/deciliter      Allergies    No Known Allergies    Intolerances        PAST MEDICAL & SURGICAL HISTORY:  Oxygen dependent  Gastroesophageal reflux disease, esophagitis presence not specified  Smoker  Bipolar 1 disorder  Coronary artery disease involving native coronary artery of native heart without angina pectoris  Type 2 diabetes mellitus without complication, with long-term current use of insulin  Pulmonary HTN  HLD (hyperlipidemia)  Stented coronary artery  COPD (chronic obstructive pulmonary disease)  No significant past surgical history      FAMILY HISTORY:  No family history of mental disorder (Father, Mother)  No family history of hypertension (Father, Mother)  No family history of chronic obstructive pulmonary disease (Father, Mother)  No family history of cardiovascular disease (Father, Mother)      SOCIAL HISTORY  Smoking History:     REVIEW OF SYSTEMS:    CONSTITUTIONAL:  No fevers, chills, sweats    HEENT:  Eyes:  No diplopia or blurred vision. ENT:  No earache, sore throat or runny nose. sinus headache or postnasl drip    CARDIOVASCULAR:  No pressure, squeezing, tightness, or heaviness about the chest; no palpitations, leg swelling, orthopnea or PND    RESPIRATORY:  above    GASTROINTESTINAL:  No abdominal pain, nausea, vomiting or diarrhea.    GENITOURINARY:  No dysuria, frequency or urgency.    NEUROLOGIC:  No paresthesias, fasciculations, seizures or weakness.    PSYCHIATRIC:  No disorder of thought or mood.    Vital Signs Last 24 Hrs  T(C): 37.3 (04 Oct 2018 04:16), Max: 37.3 (04 Oct 2018 04:16)  T(F): 99.1 (04 Oct 2018 04:16), Max: 99.1 (04 Oct 2018 04:16)  HR: 110 (04 Oct 2018 04:16) (84 - 118)  BP: 102/61 (04 Oct 2018 04:16) (102/61 - 188/84)  BP(mean): --  RR: 20 (04 Oct 2018 04:16) (20 - 24)  SpO2: 91% (04 Oct 2018 04:16) (91% - 98%)    PHYSICAL EXAMINATION:    GENERAL: The patient is a well-developed, well-nourished _____in no apparent distress.     HEENT: Head is normocephalic and atraumatic. Extraocular muscles are intact. Mucous membranes are moist.     NECK: Supple.     LUNGS: diminished BS skyla with end exp wheezes. Respirations unlabored    HEART: Regular rate and rhythm without murmur.    ABDOMEN: Soft, nontender, and nondistended.  No hepatosplenomegaly is noted.    EXTREMITIES: Without any cyanosis, clubbing, rash, lesions or edema.    NEUROLOGIC: Grossly intact.      LABS:                        15.6   6.9   )-----------( 165      ( 04 Oct 2018 06:42 )             49.0     10-04    134<L>  |  87<L>  |  20.0  ----------------------------<  371<H>  4.6   |  34.0<H>  |  0.82    Ca    9.2      04 Oct 2018 06:42  Phos  4.6     10-04  Mg     1.8     10-04    TPro  7.0  /  Alb  4.2  /  TBili  0.5  /  DBili  x   /  AST  12  /  ALT  16  /  AlkPhos  68  10-04    PT/INR - ( 04 Oct 2018 06:42 )   PT: 13.1 sec;   INR: 1.19 ratio         PTT - ( 04 Oct 2018 06:42 )  PTT:27.6 sec    ABG - ( 03 Oct 2018 23:44 )  pH, Arterial: 7.34  pH, Blood: x     /  pCO2: 69    /  pO2: 57    / HCO3: 32    / Base Excess: 8.5   /  SaO2: 92                CARDIAC MARKERS ( 04 Oct 2018 06:42 )  x     / <0.01 ng/mL / x     / x     / x      CARDIAC MARKERS ( 04 Oct 2018 01:49 )  x     / <0.01 ng/mL / x     / x     / x            Serum Pro-Brain Natriuretic Peptide: 1258 pg/mL (10-04-18 @ 06:42)  Serum Pro-Brain Natriuretic Peptide: 1242 pg/mL (10-03-18 @ 19:44)          MICROBIOLOGY:    RADIOLOGY & ADDITIONAL STUDIES:  < from: Xray Chest 2 Views PA/Lat (10.03.18 @ 17:46) >   EXAM:  XR CHEST PA LAT 2V                          PROCEDURE DATE:  10/03/2018          INTERPRETATION:      INDICATION: Shortness of breath.    PA and lateral views of chest. No previous studies are available for   comparison.    FINDINGS:       The lungs are hyperaerated with probable emphysematous changes. There is   no acute parenchymal consolidation.  There is no pleural effusion. The   cardiomediastinal silhouette is within normal limits.  There is thoracic   spondylosis.    IMPRESSION:  Clear lungs.                RAFAELA PEREZ M.D., ATTENDING RADIOLOGIST  This document has been electronically signed. Oct  4 2018  8:32AM    < end of copied text >

## 2018-10-04 NOTE — PROGRESS NOTE ADULT - ASSESSMENT
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 Found to have a positive rhinovirus on viral panel    1. Acute hypoxic respiratory failure 2/2 multifactorial (acute systolic heart failure, viral pneumonia, acute bronchitis and COPD exacerbation); Patient continues to smoke 10 cig a day. Viral panel positive for rhinovirus. Patient went for echo today due to CXR pattern and elevated BNP  - agree with lasix IV- monitor I/O and for improvement  - continue with breathing treatments and IV steroids currently on methyprednisolone [id number]  - continue with beta blocker  - continue with O2 via NC  - f/u echo read  - cardiology/pulmonary consult appreciated    2. CAD with systolic CHF  - f/u echo  - continue aspirin  - continue beta blocker  - continue statin    3. Severe COPD O2 dependent  - continue with O2 prn  - continue with above nebulizers currently duoneb q6    4. 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 Found to have a positive rhinovirus on viral panel    1. Acute hypoxic respiratory failure 2/2 multifactorial (acute systolic heart failure, viral pneumonia, acute bronchitis and COPD exacerbation); Patient continues to smoke 10 cig a day. Viral panel positive for rhinovirus. Patient went for echo today due to CXR pattern and elevated BNP  - agree with lasix IV- monitor I/O and for improvement  - continue with breathing treatments and IV steroids currently on methylprednisolone [id number]  - continue with beta blocker  - continue with O2 via NC  - f/u echo read  - cardiology/pulmonary consult appreciated  - change levofloxacin to azithromycin PO daily for 5 days    2. CAD with systolic CHF  - f/u echo  - continue aspirin  - continue beta blocker  - continue statin    3. Severe COPD O2 dependent  - continue with O2 prn  - continue with above nebulizers currently duoneb q6    4. DMii on insulin  - continue ISS  - patient may require long acting since on steroids IV    5. oral thrush  - nystatin swish and swallow    DVT ppx - on heparin 5000mg sq q8

## 2018-10-04 NOTE — CHART NOTE - NSCHARTNOTEFT_GEN_A_CORE
Rapid Response PGY 2/ PGY 3 Note    [id number]  DEVIKA CARBALLO    Rapid Repsonse was called for desaturation 80% on 4L NC    Patient was seen and examined at the bedside by the rapid response team. Patient was in no acute distress. Head of bed was elevated & patient was promptly placed on venti-mask.      Allergies    No Known Allergies    Intolerances        PAST MEDICAL & SURGICAL HISTORY:  Oxygen dependent  Gastroesophageal reflux disease, esophagitis presence not specified  Smoker  Bipolar 1 disorder  Coronary artery disease involving native coronary artery of native heart without angina pectoris  Type 2 diabetes mellitus without complication, with long-term current use of insulin  Pulmonary HTN  HLD (hyperlipidemia)  Stented coronary artery  COPD (chronic obstructive pulmonary disease)  No significant past surgical history      Vital Signs Last 24 Hrs  T(C): 36.4 (04 Oct 2018 15:31), Max: 37.3 (04 Oct 2018 04:16)  T(F): 97.6 (04 Oct 2018 15:31), Max: 99.1 (04 Oct 2018 04:16)  HR: 80 (04 Oct 2018 23:54) (77 - 118)  BP: 95/66 (04 Oct 2018 20:42) (85/50 - 102/68)  BP(mean): --  RR: 20 (04 Oct 2018 15:31) (20 - 20)  SpO2: 98% (04 Oct 2018 23:54) (90% - 98%)          PHYSICAL EXAM:    GENERAL: NAD   HEAD:  Atraumatic, Normocephalic  EYES: EOMI, conjunctiva and sclera clear  NECK:  No JVD   NERVOUS SYSTEM:  Alert & Oriented X3, Good concentration   CHEST/LUNG: decreased air entry B/L   HEART: Regular rate and rhythm; No murmurs, rubs, or gallops  ABDOMEN: Soft, Nontender, Nondistended; Bowel sounds present  EXTREMITIES:  2+ Peripheral Pulses, No clubbing, cyanosis, or edema  SKIN: facial spider angiomas to b/l cheeks      10-04 @ 07:01  -  10-04 @ 23:57  --------------------------------------------------------  IN: 0 mL / OUT: 400 mL / NET: -400 mL                              15.6   6.9   )-----------( 165      ( 04 Oct 2018 06:42 )             49.0     10-04    134<L>  |  87<L>  |  20.0  ----------------------------<  371<H>  4.6   |  34.0<H>  |  0.82    Ca    9.2      04 Oct 2018 06:42  Phos  4.6     10-04  Mg     1.8     10-04    TPro  7.0  /  Alb  4.2  /  TBili  0.5  /  DBili  x   /  AST  12  /  ALT  16  /  AlkPhos  68  10-04    ABG - ( 03 Oct 2018 23:44 )  pH, Arterial: 7.34  pH, Blood: x     /  pCO2: 69    /  pO2: 57    / HCO3: 32    / Base Excess: 8.5   /  SaO2: 92                   LIVER FUNCTIONS - ( 04 Oct 2018 06:42 )  Alb: 4.2 g/dL / Pro: 7.0 g/dL / ALK PHOS: 68 U/L / ALT: 16 U/L / AST: 12 U/L / GGT: x              PT/INR - ( 04 Oct 2018 06:42 )   PT: 13.1 sec;   INR: 1.19 ratio         PTT - ( 04 Oct 2018 06:42 )  PTT:27.6 sec    Vital Signs Last 24 Hrs*       Assessment- Rapid Response called for 58 M smoker w/ COPD/emphysema (2L home O2), pulmonary HTN, GERD, Bipolar disorder, HTN, HLD, IDDM2 presented w/ SOB admitted for acute on chronic respiratory failure with hypoxia & hypercapnia and acute on chronic CHF of unknown type.       Plan- Rapid Response PGY 2/ PGY 3 Note    [id number]  DEVIKA CARBALLO    Rapid Response was called for desaturation to 80% on 4L NC    Patient was seen and examined at the bedside by the rapid response team. Patient was in no acute distress. Head of bed was elevated & patient was promptly placed on venti-mask which immediately improved O2 sat to 99%.      Allergies    No Known Allergies    Intolerances        PAST MEDICAL & SURGICAL HISTORY:  Oxygen dependent  Gastroesophageal reflux disease, esophagitis presence not specified  Smoker  Bipolar 1 disorder  Coronary artery disease involving native coronary artery of native heart without angina pectoris  Type 2 diabetes mellitus without complication, with long-term current use of insulin  Pulmonary HTN  HLD (hyperlipidemia)  Stented coronary artery  COPD (chronic obstructive pulmonary disease)  No significant past surgical history      Vital Signs Last 24 Hrs  T(C): 36.4 (04 Oct 2018 15:31), Max: 37.3 (04 Oct 2018 04:16)  T(F): 97.6 (04 Oct 2018 15:31), Max: 99.1 (04 Oct 2018 04:16)  HR: 80 (04 Oct 2018 23:54) (77 - 118)  BP: 95/66 (04 Oct 2018 20:42) (85/50 - 102/68)  BP(mean): --  RR: 20 (04 Oct 2018 15:31) (20 - 20)  SpO2: 98% (04 Oct 2018 23:54) (90% - 98%)          PHYSICAL EXAM:    GENERAL: NAD   HEAD:  Atraumatic, Normocephalic  EYES: EOMI, conjunctiva and sclera clear  NECK:  No JVD   NERVOUS SYSTEM:  Alert & Oriented X3, Good concentration   CHEST/LUNG: decreased air entry B/L   HEART: Regular rate and rhythm; No murmurs, rubs, or gallops  ABDOMEN: Soft, Nontender, Nondistended; Bowel sounds present  EXTREMITIES:  2+ Peripheral Pulses, No clubbing, cyanosis, or edema  SKIN: facial spider angiomas to b/l cheeks      10-04 @ 07:01  -  10-04 @ 23:57  --------------------------------------------------------  IN: 0 mL / OUT: 400 mL / NET: -400 mL                              15.6   6.9   )-----------( 165      ( 04 Oct 2018 06:42 )             49.0     10-04    134<L>  |  87<L>  |  20.0  ----------------------------<  371<H>  4.6   |  34.0<H>  |  0.82    Ca    9.2      04 Oct 2018 06:42  Phos  4.6     10-04  Mg     1.8     10-04    TPro  7.0  /  Alb  4.2  /  TBili  0.5  /  DBili  x   /  AST  12  /  ALT  16  /  AlkPhos  68  10-04    ABG - ( 03 Oct 2018 23:44 )  pH, Arterial: 7.34  pH, Blood: x     /  pCO2: 69    /  pO2: 57    / HCO3: 32    / Base Excess: 8.5   /  SaO2: 92                   LIVER FUNCTIONS - ( 04 Oct 2018 06:42 )  Alb: 4.2 g/dL / Pro: 7.0 g/dL / ALK PHOS: 68 U/L / ALT: 16 U/L / AST: 12 U/L / GGT: x              PT/INR - ( 04 Oct 2018 06:42 )   PT: 13.1 sec;   INR: 1.19 ratio         PTT - ( 04 Oct 2018 06:42 )  PTT:27.6 sec    Vital Signs Last 24 Hrs*       Assessment- Rapid Response called for 58 M smoker w/ COPD/emphysema (2L home O2), pulmonary HTN, GERD, Bipolar disorder, HTN, HLD, IDDM2 presented w/ SOB admitted for acute on chronic respiratory failure with hypoxia & hypercapnia and acute on chronic CHF of unknown type found to have transient desaturation to 80% on 4L NC.    Plan-  > duonebs treatment STAT x1   > O2 to maintain O2 Sat > 88% but < 94%   > c/w IV steroids, duonebs & antibiotics for COPD  > c/w IV lasix for CHF  > case discussed w/ Hospitalist (Dr. Woodson)  > plan discussed w/ patient & nursing staff

## 2018-10-05 DIAGNOSIS — Z71.89 OTHER SPECIFIED COUNSELING: ICD-10-CM

## 2018-10-05 LAB
AMPHET UR-MCNC: NEGATIVE — SIGNIFICANT CHANGE UP
APPEARANCE UR: CLEAR — SIGNIFICANT CHANGE UP
BACTERIA # UR AUTO: ABNORMAL
BARBITURATES UR SCN-MCNC: NEGATIVE — SIGNIFICANT CHANGE UP
BENZODIAZ UR-MCNC: NEGATIVE — SIGNIFICANT CHANGE UP
BILIRUB UR-MCNC: NEGATIVE — SIGNIFICANT CHANGE UP
COCAINE METAB.OTHER UR-MCNC: NEGATIVE — SIGNIFICANT CHANGE UP
COLOR SPEC: YELLOW — SIGNIFICANT CHANGE UP
DIFF PNL FLD: ABNORMAL
GLUCOSE BLDC GLUCOMTR-MCNC: 309 MG/DL — HIGH (ref 70–99)
GLUCOSE BLDC GLUCOMTR-MCNC: 328 MG/DL — HIGH (ref 70–99)
GLUCOSE BLDC GLUCOMTR-MCNC: 367 MG/DL — HIGH (ref 70–99)
GLUCOSE BLDC GLUCOMTR-MCNC: 381 MG/DL — HIGH (ref 70–99)
GLUCOSE UR QL: 1000 MG/DL
KETONES UR-MCNC: ABNORMAL
LEUKOCYTE ESTERASE UR-ACNC: NEGATIVE — SIGNIFICANT CHANGE UP
METHADONE UR-MCNC: NEGATIVE — SIGNIFICANT CHANGE UP
NITRITE UR-MCNC: POSITIVE
OPIATES UR-MCNC: NEGATIVE — SIGNIFICANT CHANGE UP
PCP SPEC-MCNC: SIGNIFICANT CHANGE UP
PCP UR-MCNC: NEGATIVE — SIGNIFICANT CHANGE UP
PH UR: 6 — SIGNIFICANT CHANGE UP (ref 5–8)
PROT UR-MCNC: NEGATIVE MG/DL — SIGNIFICANT CHANGE UP
RBC CASTS # UR COMP ASSIST: SIGNIFICANT CHANGE UP /HPF (ref 0–4)
SP GR SPEC: 1.01 — SIGNIFICANT CHANGE UP (ref 1.01–1.02)
THC UR QL: NEGATIVE — SIGNIFICANT CHANGE UP
UROBILINOGEN FLD QL: NEGATIVE MG/DL — SIGNIFICANT CHANGE UP
WBC UR QL: SIGNIFICANT CHANGE UP

## 2018-10-05 PROCEDURE — 71275 CT ANGIOGRAPHY CHEST: CPT | Mod: 26

## 2018-10-05 PROCEDURE — 99233 SBSQ HOSP IP/OBS HIGH 50: CPT

## 2018-10-05 PROCEDURE — 99232 SBSQ HOSP IP/OBS MODERATE 35: CPT

## 2018-10-05 RX ADMIN — HEPARIN SODIUM 5000 UNIT(S): 5000 INJECTION INTRAVENOUS; SUBCUTANEOUS at 17:16

## 2018-10-05 RX ADMIN — Medication 81 MILLIGRAM(S): at 12:26

## 2018-10-05 RX ADMIN — CLOPIDOGREL BISULFATE 75 MILLIGRAM(S): 75 TABLET, FILM COATED ORAL at 12:26

## 2018-10-05 RX ADMIN — Medication 40 MILLIGRAM(S): at 17:15

## 2018-10-05 RX ADMIN — Medication 1 MILLIGRAM(S): at 00:11

## 2018-10-05 RX ADMIN — Medication 1 PATCH: at 17:28

## 2018-10-05 RX ADMIN — Medication 500000 UNIT(S): at 05:42

## 2018-10-05 RX ADMIN — Medication 1 MILLIGRAM(S): at 05:42

## 2018-10-05 RX ADMIN — PANTOPRAZOLE SODIUM 40 MILLIGRAM(S): 20 TABLET, DELAYED RELEASE ORAL at 05:47

## 2018-10-05 RX ADMIN — Medication 3 MILLILITER(S): at 09:06

## 2018-10-05 RX ADMIN — LISINOPRIL 5 MILLIGRAM(S): 2.5 TABLET ORAL at 05:42

## 2018-10-05 RX ADMIN — Medication 10: at 17:15

## 2018-10-05 RX ADMIN — HEPARIN SODIUM 5000 UNIT(S): 5000 INJECTION INTRAVENOUS; SUBCUTANEOUS at 05:32

## 2018-10-05 RX ADMIN — HEPARIN SODIUM 5000 UNIT(S): 5000 INJECTION INTRAVENOUS; SUBCUTANEOUS at 23:09

## 2018-10-05 RX ADMIN — Medication 500000 UNIT(S): at 00:11

## 2018-10-05 RX ADMIN — Medication 40 MILLIGRAM(S): at 23:09

## 2018-10-05 RX ADMIN — ATORVASTATIN CALCIUM 40 MILLIGRAM(S): 80 TABLET, FILM COATED ORAL at 23:09

## 2018-10-05 RX ADMIN — AZITHROMYCIN 500 MILLIGRAM(S): 500 TABLET, FILM COATED ORAL at 17:15

## 2018-10-05 RX ADMIN — Medication 3 MILLILITER(S): at 15:27

## 2018-10-05 RX ADMIN — Medication 40 MILLIGRAM(S): at 17:16

## 2018-10-05 RX ADMIN — Medication 500000 UNIT(S): at 23:09

## 2018-10-05 RX ADMIN — Medication 3 MILLILITER(S): at 20:06

## 2018-10-05 RX ADMIN — Medication 10: at 08:23

## 2018-10-05 RX ADMIN — Medication 1 MILLIGRAM(S): at 17:16

## 2018-10-05 RX ADMIN — Medication 500000 UNIT(S): at 12:28

## 2018-10-05 RX ADMIN — Medication 40 MILLIGRAM(S): at 05:42

## 2018-10-05 RX ADMIN — Medication 8: at 12:26

## 2018-10-05 RX ADMIN — Medication 1 PATCH: at 17:15

## 2018-10-05 RX ADMIN — Medication 40 MILLIGRAM(S): at 12:28

## 2018-10-05 NOTE — RESPIRATORY CARE EMERGENCY NOTE - CAC RESPIRATORY COMMENTS
Called by nurse, patient is Saturating 86 to 88% on 5 litre nasal canula.  Upon entering room patient was not SOB asked patient if he feels SOB patient claims he is comfortable  I placed the patient on a Venti mask 50%. Patient SAT% increased to 99%.  Auscultation revealed expiratory wheezes secondary to COPD.  Duoneb was given with improvement to expiratory wheezes.   During this time a Rapid response called for patient desaturation. Called by nurse, patient is Saturating 86 to 88% on 5 litre nasal canula.  Upon entering room patient was not SOB asked patient if he feels SOB patient claims he is comfortable  I placed the patient on a Venti mask 50%. Patient SAT% increased to 99%.  Auscultation revealed expiratory wheezes secondary to COPD.  Duoneb was given with improvement to expiratory wheezes. Called by nurse, patient is Saturating 80 to 86% on 5 litre nasal canula.  Upon entering room patient was not SOB asked patient if he feels SOB patient claims he is comfortable  I placed the patient on a Venti mask 50%. Patient SAT% increased to 99%.  Auscultation revealed expiratory wheezes secondary to COPD.  Duoneb was given with improvement to expiratory wheezes.

## 2018-10-05 NOTE — PROGRESS NOTE ADULT - ASSESSMENT
57 yo M with pulmonary HTN, GERD, Bipolar disorder, HTN, HLD, daily tobacco smoker T2DM on insulin,   CAD s/p PCI in 2016 (RCA?) by Dr. Patrice Belle in [place] (I called office not open)  severe COPD/emphysema on home oxygen 2L admitted with acute respiratory failure, hypoxia and acute decompensated HF    A/  Acute Diastolic Heart  failure  Echo Prelim: Hyperdynamic LV EF >75. RV severely enlarged. RV function reduced. RA severely enlarged Trace MR. Mod TR. PASP 56mmHg.  COPD; underlying stage 4 dz  pHTN  Acute on chronic hypoxic resp failure  Chronic hypercarbic resp failure  Rhinovirus infection  Smoker    P/  Try HFO2.  Con BIPAP, steroid, nebs.  d/c Lasix  Avoid dehydration

## 2018-10-05 NOTE — PROGRESS NOTE ADULT - ASSESSMENT
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 Found to have a positive rhinovirus on viral panel    1. Acute hypoxic respiratory failure 2/2 multifactorial (acute systolic heart failure, viral pneumonia, acute bronchitis and COPD exacerbation); Patient continues to smoke 10 cig a day. Viral panel positive for rhinovirus. Patient went for echo today due to CXR pattern and elevated BNP  - IV lasix on hold for now  - tight air entry with very bad COPD exacerbation  - continue with breathing treatments and IV steroids currently on methylprednisolone [id number]; would continue for now  - on low dose beta blocker  - continue with O2 via high flow NC  - f/u echo read - discussed with cardiology need for read  - cardiology/pulmonary consult appreciated  - c/w azithromycin PO for 5 days total    2. CAD with systolic CHF  - f/u echo  - continue aspirin  - continue beta blocker  - continue statin    3. Severe COPD O2 dependent  - continue with O2  - continue with above nebulizers currently duoneb q6 as above  - steroids as above    4. DMii on insulin  - continue ISS  - patient may require long acting since on steroids IV    5. oral thrush  - nystatin swish and swallow    DVT ppx - on heparin 5000mg sq q8

## 2018-10-05 NOTE — PROGRESS NOTE ADULT - SUBJECTIVE AND OBJECTIVE BOX
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 Pt states he lives in a rented room in a home where everyone is sick with a cold. Pt reports waking up today to smoke after drinking nyquil for his congestion and dry cough and approx one hour later states he could not breathe despite using his oxygen. Pt denies any cp, palpitations, n/v/d, HA, fever, chills, lethargy, gu sx. Pt uses [facility] which is closed, states he is on a blood thinner that is not coumadin but does not know. Additional meds reconciled however pt does not know doses. (03 Oct 2018 22:29)    INTERVAL HPI/OVERNIGHT EVENTS:  Patient received IV lasix in the ED and had increased urine output but blood pressure remained in 80's so further lasix was not given. Patient was asymptomatic during this time. Echo performed but not read.    Overnight patient went >6 hours without breathing treatment until RRT was called at 1130PM.    Patient had RRT overnight for desat into the 80%. Currently doing well on venti mask. Seen by pulm - will attempt high flow NC. Patient daughter at bedside. She is concerned that if he returns to group home (they are renting a room for him) that he will come back due to the amount of smoking (patient also smokes). Sister has arranged for him to have a oxygen concentrator as the group home doesn't allow O2 tanks (because smoking is allowed; patient also smokes).     Today patient feels SOB is better from last night.    ICU Vital Signs Last 24 Hrs  T(C): 36.5 (05 Oct 2018 10:59), Max: 36.8 (05 Oct 2018 04:14)  T(F): 97.7 (05 Oct 2018 10:59), Max: 98.2 (05 Oct 2018 04:14)  HR: 100 (05 Oct 2018 10:59) (77 - 103)  BP: 98/62 (05 Oct 2018 10:59) (90/60 - 108/62)  BP(mean): --  ABP: --  ABP(mean): --  RR: 10 (05 Oct 2018 10:59) (10 - 26)  SpO2: 95% (05 Oct 2018 11:00) (84% - 98%)    PHYSICAL EXAM:  General: Well developed; well nourished; in no acute distress  Eyes: PERRLA, EOMI; conjunctiva and sclera clear  Head: Normocephalic; atraumatic  ENMT: No nasal discharge; airway clear; NC in place; oropharynx with some whitish plaque inside that removes easily concerning for thrush; no erythema in visible nasopharynx  Neck: Supple; non tender; no masses  Respiratory: No wheezing heard this morning; patient has very poor air-entry.  Cardiovascular: slightly tachycardic rate with regular and rhythm. S1 and S2 Normal; No murmurs, gallops or rubs  Gastrointestinal: Soft non-tender non-distended; Normal bowel sounds  Genitourinary: No costovertebral angle tenderness  Extremities: Normal range of motion, No clubbing, cyanosis or edema  Vascular: Peripheral pulses palpable 2+ bilaterally  Neurological: Alert and oriented x4  Musculoskeletal: Normal tone, without deformities  Psychiatric: Cooperative and appropriate    I&O's Detail    CARDIAC MARKERS ( 04 Oct 2018 06:42 )  x     / <0.01 ng/mL / x     / x     / x      CARDIAC MARKERS ( 04 Oct 2018 01:49 )  x     / <0.01 ng/mL / x     / x     / x                            15.6   6.9   )-----------( 165      ( 04 Oct 2018 06:42 )             49.0     04 Oct 2018 06:42    134    |  87     |  20.0   ----------------------------<  371    4.6     |  34.0   |  0.82     Ca    9.2        04 Oct 2018 06:42  Phos  4.6       04 Oct 2018 06:42  Mg     1.8       04 Oct 2018 06:42    TPro  7.0    /  Alb  4.2    /  TBili  0.5    /  DBili  x      /  AST  12     /  ALT  16     /  AlkPhos  68     04 Oct 2018 06:42    PT/INR - ( 04 Oct 2018 06:42 )   PT: 13.1 sec;   INR: 1.19 ratio       PTT - ( 04 Oct 2018 06:42 )  PTT:27.6 sec  CAPILLARY BLOOD GLUCOSE    POCT Blood Glucose.: 357 mg/dL (04 Oct 2018 13:38)  POCT Blood Glucose.: 351 mg/dL (04 Oct 2018 08:10)  POCT Blood Glucose.: 304 mg/dL (03 Oct 2018 22:50)    LIVER FUNCTIONS - ( 04 Oct 2018 06:42 )  Alb: 4.2 g/dL / Pro: 7.0 g/dL / ALK PHOS: 68 U/L / ALT: 16 U/L / AST: 12 U/L / GGT: x           Hemoglobin A1C, Whole Blood: 10.8 % (10-04-18 @ 06:42)    MEDICATIONS  (STANDING):  ALBUTerol/ipratropium for Nebulization 3 milliLiter(s) Nebulizer every 6 hours  ALPRAZolam 1 milliGRAM(s) Oral two times a day  aspirin enteric coated 81 milliGRAM(s) Oral daily  atorvastatin 40 milliGRAM(s) Oral at bedtime  clopidogrel Tablet 75 milliGRAM(s) Oral daily  dextrose 5%. 1000 milliLiter(s) (50 mL/Hr) IV Continuous <Continuous>  dextrose 50% Injectable 12.5 Gram(s) IV Push once  dextrose 50% Injectable 25 Gram(s) IV Push once  dextrose 50% Injectable 25 Gram(s) IV Push once  furosemide   Injectable 40 milliGRAM(s) IV Push every 12 hours  heparin  Injectable 5000 Unit(s) SubCutaneous every 8 hours  insulin lispro (HumaLOG) corrective regimen sliding scale   SubCutaneous three times a day before meals  levoFLOXacin  Tablet 750 milliGRAM(s) Oral every 24 hours  lisinopril 5 milliGRAM(s) Oral daily  methylPREDNISolone sodium succinate Injectable 40 milliGRAM(s) IV Push every 6 hours  metoprolol tartrate 12.5 milliGRAM(s) Oral two times a day  nicotine - 21 mG/24Hr(s) Patch 1 patch Transdermal daily  pantoprazole    Tablet 40 milliGRAM(s) Oral before breakfast    MEDICATIONS  (PRN):  acetaminophen   Tablet .. 650 milliGRAM(s) Oral every 6 hours PRN Temp greater or equal to 38C (100.4F), Moderate Pain (4 - 6)  dextrose 40% Gel 15 Gram(s) Oral once PRN Blood Glucose LESS THAN 70 milliGRAM(s)/deciliter  glucagon  Injectable 1 milliGRAM(s) IntraMuscular once PRN Glucose LESS THAN 70 milligrams/deciliter    RADIOLOGY & ADDITIONAL TESTS:  < from: Xray Chest 2 Views PA/Lat (10.03.18 @ 17:46) >  FINDINGS:       The lungs are hyperaerated with probable emphysematous changes. There is   no acute parenchymal consolidation.  There is no pleural effusion. The   cardiomediastinal silhouette is within normal limits.  There is thoracic   spondylosis.    IMPRESSION:  Clear lungs.    < end of copied text >    Rapid Respiratory Viral Panel (10.04.18 @ 01:38)    Rapid RVP Result: Detected: The [employer] RVP Rapid uses polymerase chain reaction (PCR) and melt  curve analysis to screen for adenovirus; coronavirus HKU1, NL63, 229E,  OC43; human metapneumovirus (hMPV); human enterovirus/rhinovirus  (Entero/RV); influenza A; influenza A/H1;influenza A/H3; influenza  A/H1-2009; influenza B; parainfluenza viruses 1, 2, 3, 4; respiratory  syncytial virus; Bordetella pertussis; Mycoplasma pneumoniae; and  Chlamydophila pneumoniae.    Entero/Rhinovirus (RapRVP): Detected

## 2018-10-05 NOTE — PROGRESS NOTE ADULT - SUBJECTIVE AND OBJECTIVE BOX
PULMONARY PROGRESS NOTE      DEVIKA CARBALLOANKIT-[id number]    Patient is a 58y old  Male who presents with a chief complaint of shortness of breath (05 Oct 2018 09:47)      INTERVAL HPI/OVERNIGHT EVENTS: Above noted. Increased hypoxia. The patient says he feels well and is breathing better. Less cough. No distress. On ventimask.     MEDICATIONS  (STANDING):  ALBUTerol/ipratropium for Nebulization 3 milliLiter(s) Nebulizer every 6 hours  ALPRAZolam 1 milliGRAM(s) Oral two times a day  aspirin enteric coated 81 milliGRAM(s) Oral daily  atorvastatin 40 milliGRAM(s) Oral at bedtime  azithromycin   Tablet 500 milliGRAM(s) Oral daily  clopidogrel Tablet 75 milliGRAM(s) Oral daily  dextrose 5%. 1000 milliLiter(s) (50 mL/Hr) IV Continuous <Continuous>  dextrose 50% Injectable 12.5 Gram(s) IV Push once  dextrose 50% Injectable 25 Gram(s) IV Push once  dextrose 50% Injectable 25 Gram(s) IV Push once  furosemide   Injectable 40 milliGRAM(s) IV Push two times a day  heparin  Injectable 5000 Unit(s) SubCutaneous every 8 hours  insulin lispro (HumaLOG) corrective regimen sliding scale   SubCutaneous three times a day before meals  lisinopril 5 milliGRAM(s) Oral daily  methylPREDNISolone sodium succinate Injectable 40 milliGRAM(s) IV Push every 6 hours  metoprolol tartrate 12.5 milliGRAM(s) Oral two times a day  nicotine - 21 mG/24Hr(s) Patch 1 patch Transdermal daily  nystatin    Suspension 895075 Unit(s) Oral four times a day  pantoprazole    Tablet 40 milliGRAM(s) Oral before breakfast      MEDICATIONS  (PRN):  acetaminophen   Tablet .. 650 milliGRAM(s) Oral every 6 hours PRN Temp greater or equal to 38C (100.4F), Moderate Pain (4 - 6)  dextrose 40% Gel 15 Gram(s) Oral once PRN Blood Glucose LESS THAN 70 milliGRAM(s)/deciliter  glucagon  Injectable 1 milliGRAM(s) IntraMuscular once PRN Glucose LESS THAN 70 milligrams/deciliter      Allergies    No Known Allergies    Intolerances        PAST MEDICAL & SURGICAL HISTORY:  Oxygen dependent  Gastroesophageal reflux disease, esophagitis presence not specified  Smoker  Bipolar 1 disorder  Coronary artery disease involving native coronary artery of native heart without angina pectoris  Type 2 diabetes mellitus without complication, with long-term current use of insulin  Pulmonary HTN  HLD (hyperlipidemia)  Stented coronary artery  COPD (chronic obstructive pulmonary disease)  No significant past surgical history      SOCIAL HISTORY  Smoking History: current smoker      REVIEW OF SYSTEMS:    CONSTITUTIONAL:  No distress    HEENT:  Eyes:  No diplopia or blurred vision. ENT:  No earache, sore throat or runny nose.    CARDIOVASCULAR:  No pressure, squeezing, tightness, heaviness or aching about the chest; no palpitations.    RESPIRATORY:  per HPI     GASTROINTESTINAL:  No nausea, vomiting or diarrhea.    GENITOURINARY:  No dysuria, frequency or urgency.    NEUROLOGIC:  No paresthesias, fasciculations, seizures or weakness.    PSYCHIATRIC:  Hx bipolar     Vital Signs Last 24 Hrs  T(C): 36.8 (05 Oct 2018 04:14), Max: 36.8 (05 Oct 2018 04:14)  T(F): 98.2 (05 Oct 2018 04:14), Max: 98.2 (05 Oct 2018 04:14)  HR: 100 (05 Oct 2018 09:07) (77 - 103)  BP: 107/68 (05 Oct 2018 04:14) (85/50 - 108/62)  BP(mean): --  RR: 22 (05 Oct 2018 04:14) (20 - 26)  SpO2: 95% (05 Oct 2018 09:07) (84% - 98%)    PHYSICAL EXAMINATION:    GENERAL: The patient is awake and alert in no apparent distress.     HEENT: Head is normocephalic and atraumatic. Extraocular muscles are intact. Mucous membranes are moist.    NECK: Supple.    LUNGS: scattered wheeze b/l, good air entry, respirations unlabored    HEART: Regular rate and rhythm      ABDOMEN: Soft, nontender, and nondistended.      EXTREMITIES: Without any cyanosis, clubbing, rash, lesions or edema.    NEUROLOGIC: Grossly intact.    LABS:                        15.6   6.9   )-----------( 165      ( 04 Oct 2018 06:42 )             49.0     10-04    134<L>  |  87<L>  |  20.0  ----------------------------<  371<H>  4.6   |  34.0<H>  |  0.82    Ca    9.2      04 Oct 2018 06:42  Phos  4.6     10-04  Mg     1.8     10-04    TPro  7.0  /  Alb  4.2  /  TBili  0.5  /  DBili  x   /  AST  12  /  ALT  16  /  AlkPhos  68  10-04    PT/INR - ( 04 Oct 2018 06:42 )   PT: 13.1 sec;   INR: 1.19 ratio         PTT - ( 04 Oct 2018 06:42 )  PTT:27.6 sec     ABG - ( 03 Oct 2018 23:44 )  pH, Arterial: 7.34  pH, Blood: x     /  pCO2: 69    /  pO2: 57    / HCO3: 32    / Base Excess: 8.5   /  SaO2: 92               CARDIAC MARKERS ( 04 Oct 2018 06:42 )  x     / <0.01 ng/mL / x     / x     / x      CARDIAC MARKERS ( 04 Oct 2018 01:49 )  x     / <0.01 ng/mL / x     / x     / x            Serum Pro-Brain Natriuretic Peptide: 1258 pg/mL (10-04-18 @ 06:42)  Serum Pro-Brain Natriuretic Peptide: 1242 pg/mL (10-03-18 @ 19:44)               RADIOLOGY & ADDITIONAL STUDIES:  < from: Xray Chest 2 Views PA/Lat (10.03.18 @ 17:46) >   EXAM:  XR CHEST PA LAT 2V                          PROCEDURE DATE:  10/03/2018          INTERPRETATION:      INDICATION: Shortness of breath.    PA and lateral views of chest. No previous studies are available for   comparison.    FINDINGS:       The lungs are hyperaerated with probable emphysematous changes. There is   no acute parenchymal consolidation.  There is no pleural effusion. The   cardiomediastinal silhouette is within normal limits.  There is thoracic   spondylosis.    IMPRESSION:  Clear lungs.                RAFAELA PEREZ M.D., ATTENDING RADIOLOGIST    < end of copied text >

## 2018-10-05 NOTE — PROGRESS NOTE ADULT - ASSESSMENT
-AECOPD; underlying stage 4 dz  -Hx pulm htn  -Acute on chronic hypoxic resp failure  -Chronic hypercarbic resp failure  -Rhinovirus infection  -Smoker    RECC:  Try HFO2. Noct bpap. Continue IV steroids. Nebs. Smoking cessation a must. DVT prophylaxis.     D/W RT.

## 2018-10-05 NOTE — PROGRESS NOTE ADULT - SUBJECTIVE AND OBJECTIVE BOX
CARDIOLOGY PROGRESS NOTE   ([facility])                                                                                                        Subjective:     Vitals:  T(C): 36.8 (10-05-18 @ 04:14), Max: 36.8 (10-05-18 @ 04:14)  HR: 100 (10-05-18 @ 09:07) (77 - 103)  BP: 107/68 (10-05-18 @ 04:14) (85/50 - 108/62)  RR: 22 (10-05-18 @ 04:14) (20 - 26)  SpO2: 95% (10-05-18 @ 09:07) (84% - 98%)  Wt(kg): --  I&O's Summary    04 Oct 2018 07:01  -  05 Oct 2018 07:00  --------------------------------------------------------  IN: 0 mL / OUT: 400 mL / NET: -400 mL          PHYSICAL EXAM:  Appearance: Normal	  HEENT:   Atraumatic  Cardiovascular: Normal S1 S2, No JVD, No murmurs, No edema  Respiratory: Lungs clear to auscultation	  Gastrointestinal:  Soft, Non-tender, + BS	  Skin: No rashes, No ecchymoses, No cyanosis  Neurologic: Alert and awake, able to move extremities  Extremities: No edema    TELEMETRY: 	    ECG:  	      DIAGNOSTIC TESTING:  [ ] Echocardiogram:  [ ]  Catheterization:  [ ] Stress Test:    OTHER: 	      CURRENT MEDICATIONS:  furosemide   Injectable 40 milliGRAM(s) IV Push two times a day  lisinopril 5 milliGRAM(s) Oral daily  metoprolol tartrate 12.5 milliGRAM(s) Oral two times a day    azithromycin   Tablet 500 milliGRAM(s) Oral daily  nystatin    Suspension 396725 Unit(s) Oral four times a day    ALBUTerol/ipratropium for Nebulization 3 milliLiter(s) Nebulizer every 6 hours    acetaminophen   Tablet .. 650 milliGRAM(s) Oral every 6 hours PRN  ALPRAZolam 1 milliGRAM(s) Oral two times a day    pantoprazole    Tablet 40 milliGRAM(s) Oral before breakfast    atorvastatin 40 milliGRAM(s) Oral at bedtime  dextrose 40% Gel 15 Gram(s) Oral once PRN  dextrose 50% Injectable 12.5 Gram(s) IV Push once  dextrose 50% Injectable 25 Gram(s) IV Push once  dextrose 50% Injectable 25 Gram(s) IV Push once  glucagon  Injectable 1 milliGRAM(s) IntraMuscular once PRN  insulin lispro (HumaLOG) corrective regimen sliding scale   SubCutaneous three times a day before meals  methylPREDNISolone sodium succinate Injectable 40 milliGRAM(s) IV Push every 6 hours    aspirin enteric coated 81 milliGRAM(s) Oral daily  clopidogrel Tablet 75 milliGRAM(s) Oral daily  dextrose 5%. 1000 milliLiter(s) IV Continuous <Continuous>  heparin  Injectable 5000 Unit(s) SubCutaneous every 8 hours      LABS:	 	    CARDIAC MARKERS:  Troponin I:   CPK total =  CK MB=   CKMB index=                           15.6   6.9   )-----------( 165      ( 04 Oct 2018 06:42 )             49.0     10-04    134<L>  |  87<L>  |  20.0  ----------------------------<  371<H>  4.6   |  34.0<H>  |  0.82    Ca    9.2      04 Oct 2018 06:42  Phos  4.6     10-04  Mg     1.8     10-04    TPro  7.0  /  Alb  4.2  /  TBili  0.5  /  DBili  x   /  AST  12  /  ALT  16  /  AlkPhos  68  10-04    proBNP: Serum Pro-Brain Natriuretic Peptide: 1258 pg/mL (10-04 @ 06:42)  Serum Pro-Brain Natriuretic Peptide: 1242 pg/mL (10-03 @ 19:44)    Lipid Profile: Date: 10-04 @ 06:42  Total cholesterol 136; Direct LDL: 85; HDL: 27; Triglycerides:122    HgA1c:   TSH: Thyroid Stimulating Hormone, Serum: 0.40 uIU/mL (10-04 @ 06:42) CARDIOLOGY PROGRESS NOTE   ([facility])                                                                                                        Subjective:     on ventimask  cough reduced, feels better  hypoxia overnight  denied cp          Vitals:  T(C): 36.8 (10-05-18 @ 04:14), Max: 36.8 (10-05-18 @ 04:14)  HR: 100 (10-05-18 @ 09:07) (77 - 103)  BP: 107/68 (10-05-18 @ 04:14) (85/50 - 108/62)  RR: 22 (10-05-18 @ 04:14) (20 - 26)  SpO2: 95% (10-05-18 @ 09:07) (84% - 98%)  Wt(kg): --  I&O's Summary    04 Oct 2018 07:01  -  05 Oct 2018 07:00  --------------------------------------------------------  IN: 0 mL / OUT: 400 mL / NET: -400 mL          PHYSICAL EXAM:  Appearance: Normal	  HEENT:   Atraumatic  Cardiovascular: Normal S1 S2, No JVD, No murmurs, No edema  Respiratory: Occasional end exp wheezes  Gastrointestinal:  Soft, Non-tender, + BS	  Skin: No rashes, No ecchymoses, No cyanosis  Neurologic: Alert and awake, able to move extremities  Extremities: No edema    TELEMETRY: 	  SR      CURRENT MEDICATIONS:  furosemide   Injectable 40 milliGRAM(s) IV Push two times a day  lisinopril 5 milliGRAM(s) Oral daily  metoprolol tartrate 12.5 milliGRAM(s) Oral two times a day    azithromycin   Tablet 500 milliGRAM(s) Oral daily  nystatin    Suspension 128583 Unit(s) Oral four times a day    ALBUTerol/ipratropium for Nebulization 3 milliLiter(s) Nebulizer every 6 hours    acetaminophen   Tablet .. 650 milliGRAM(s) Oral every 6 hours PRN  ALPRAZolam 1 milliGRAM(s) Oral two times a day    pantoprazole    Tablet 40 milliGRAM(s) Oral before breakfast    atorvastatin 40 milliGRAM(s) Oral at bedtime  dextrose 40% Gel 15 Gram(s) Oral once PRN  dextrose 50% Injectable 12.5 Gram(s) IV Push once  dextrose 50% Injectable 25 Gram(s) IV Push once  dextrose 50% Injectable 25 Gram(s) IV Push once  glucagon  Injectable 1 milliGRAM(s) IntraMuscular once PRN  insulin lispro (HumaLOG) corrective regimen sliding scale   SubCutaneous three times a day before meals  methylPREDNISolone sodium succinate Injectable 40 milliGRAM(s) IV Push every 6 hours    aspirin enteric coated 81 milliGRAM(s) Oral daily  clopidogrel Tablet 75 milliGRAM(s) Oral daily  dextrose 5%. 1000 milliLiter(s) IV Continuous <Continuous>  heparin  Injectable 5000 Unit(s) SubCutaneous every 8 hours      LABS:	 	                          15.6   6.9   )-----------( 165      ( 04 Oct 2018 06:42 )             49.0     10-04    134<L>  |  87<L>  |  20.0  ----------------------------<  371<H>  4.6   |  34.0<H>  |  0.82    Ca    9.2      04 Oct 2018 06:42  Phos  4.6     10-04  Mg     1.8     10-04    TPro  7.0  /  Alb  4.2  /  TBili  0.5  /  DBili  x   /  AST  12  /  ALT  16  /  AlkPhos  68  10-04    proBNP: Serum Pro-Brain Natriuretic Peptide: 1258 pg/mL (10-04 @ 06:42)  Serum Pro-Brain Natriuretic Peptide: 1242 pg/mL (10-03 @ 19:44)    Lipid Profile: Date: 10-04 @ 06:42  Total cholesterol 136; Direct LDL: 85; HDL: 27; Triglycerides:122    HgA1c:   TSH: Thyroid Stimulating Hormone, Serum: 0.40 uIU/mL (10-04 @ 06:42)        CTA: No evidence of pulmonary embolism.. LEFT lower lobe airspace consolidation concerning for infectious pneumonia.      EKG  Ventricular Rate 112 BPM    Atrial Rate 112 BPM    P-R Interval 126 ms    QRS Duration 142 ms    Q-T Interval 366 ms    QTC Calculation(Bezet) 499 ms    P Axis 78 degrees    R Axis 259 degrees    T Axis 47 degrees    Diagnosis Line Sinus tachycardia  Right bundle branch block  Inferior infarct , age undetermined  Abnormal ECG    Confirmed by Bharat Sagastume ([id number]) on 10/4/2018 11:42:28 AM

## 2018-10-06 LAB
GLUCOSE BLDC GLUCOMTR-MCNC: 378 MG/DL — HIGH (ref 70–99)
GLUCOSE BLDC GLUCOMTR-MCNC: 383 MG/DL — HIGH (ref 70–99)
GLUCOSE BLDC GLUCOMTR-MCNC: 409 MG/DL — HIGH (ref 70–99)
LEGIONELLA AG UR QL: NEGATIVE — SIGNIFICANT CHANGE UP

## 2018-10-06 PROCEDURE — 99233 SBSQ HOSP IP/OBS HIGH 50: CPT

## 2018-10-06 RX ORDER — INSULIN LISPRO 100/ML
VIAL (ML) SUBCUTANEOUS
Qty: 0 | Refills: 0 | Status: DISCONTINUED | OUTPATIENT
Start: 2018-10-06 | End: 2018-11-01

## 2018-10-06 RX ORDER — ALBUTEROL 90 UG/1
2.5 AEROSOL, METERED ORAL EVERY 4 HOURS
Qty: 0 | Refills: 0 | Status: DISCONTINUED | OUTPATIENT
Start: 2018-10-06 | End: 2018-11-01

## 2018-10-06 RX ORDER — INSULIN LISPRO 100/ML
VIAL (ML) SUBCUTANEOUS AT BEDTIME
Qty: 0 | Refills: 0 | Status: DISCONTINUED | OUTPATIENT
Start: 2018-10-06 | End: 2018-10-06

## 2018-10-06 RX ORDER — ENOXAPARIN SODIUM 100 MG/ML
40 INJECTION SUBCUTANEOUS DAILY
Qty: 0 | Refills: 0 | Status: DISCONTINUED | OUTPATIENT
Start: 2018-10-06 | End: 2018-11-01

## 2018-10-06 RX ORDER — AZITHROMYCIN 500 MG/1
250 TABLET, FILM COATED ORAL DAILY
Qty: 0 | Refills: 0 | Status: COMPLETED | OUTPATIENT
Start: 2018-10-07 | End: 2018-10-09

## 2018-10-06 RX ORDER — INSULIN GLARGINE 100 [IU]/ML
20 INJECTION, SOLUTION SUBCUTANEOUS EVERY MORNING
Qty: 0 | Refills: 0 | Status: DISCONTINUED | OUTPATIENT
Start: 2018-10-06 | End: 2018-10-07

## 2018-10-06 RX ADMIN — Medication 40 MILLIGRAM(S): at 17:57

## 2018-10-06 RX ADMIN — HEPARIN SODIUM 5000 UNIT(S): 5000 INJECTION INTRAVENOUS; SUBCUTANEOUS at 05:45

## 2018-10-06 RX ADMIN — Medication 500000 UNIT(S): at 05:46

## 2018-10-06 RX ADMIN — PANTOPRAZOLE SODIUM 40 MILLIGRAM(S): 20 TABLET, DELAYED RELEASE ORAL at 05:46

## 2018-10-06 RX ADMIN — HEPARIN SODIUM 5000 UNIT(S): 5000 INJECTION INTRAVENOUS; SUBCUTANEOUS at 13:26

## 2018-10-06 RX ADMIN — Medication 81 MILLIGRAM(S): at 11:42

## 2018-10-06 RX ADMIN — Medication 1 PATCH: at 11:42

## 2018-10-06 RX ADMIN — Medication 1 MILLIGRAM(S): at 05:45

## 2018-10-06 RX ADMIN — Medication 1 PATCH: at 11:53

## 2018-10-06 RX ADMIN — Medication 10: at 11:41

## 2018-10-06 RX ADMIN — Medication 40 MILLIGRAM(S): at 05:40

## 2018-10-06 RX ADMIN — CLOPIDOGREL BISULFATE 75 MILLIGRAM(S): 75 TABLET, FILM COATED ORAL at 11:42

## 2018-10-06 RX ADMIN — Medication 500000 UNIT(S): at 11:42

## 2018-10-06 RX ADMIN — Medication 3 MILLILITER(S): at 08:41

## 2018-10-06 RX ADMIN — Medication 12: at 22:49

## 2018-10-06 RX ADMIN — Medication 500000 UNIT(S): at 23:39

## 2018-10-06 RX ADMIN — INSULIN GLARGINE 20 UNIT(S): 100 INJECTION, SOLUTION SUBCUTANEOUS at 12:41

## 2018-10-06 RX ADMIN — LISINOPRIL 5 MILLIGRAM(S): 2.5 TABLET ORAL at 05:46

## 2018-10-06 RX ADMIN — Medication 3 MILLILITER(S): at 04:23

## 2018-10-06 RX ADMIN — AZITHROMYCIN 500 MILLIGRAM(S): 500 TABLET, FILM COATED ORAL at 11:42

## 2018-10-06 RX ADMIN — Medication 500000 UNIT(S): at 17:57

## 2018-10-06 RX ADMIN — Medication 1 MILLIGRAM(S): at 17:57

## 2018-10-06 RX ADMIN — Medication 10: at 08:02

## 2018-10-06 RX ADMIN — ATORVASTATIN CALCIUM 40 MILLIGRAM(S): 80 TABLET, FILM COATED ORAL at 22:25

## 2018-10-06 RX ADMIN — Medication 3 MILLILITER(S): at 20:41

## 2018-10-06 RX ADMIN — Medication 8: at 16:42

## 2018-10-06 RX ADMIN — Medication 3 MILLILITER(S): at 15:11

## 2018-10-06 NOTE — PROGRESS NOTE ADULT - SUBJECTIVE AND OBJECTIVE BOX
PULMONARY PROGRESS NOTE      DEVIKA CARBALLOANKIT-[id number]    Patient is a 58y old  Male who presents with a chief complaint of shortness of breath (05 Oct 2018 15:32)      INTERVAL HPI/OVERNIGHT EVENTS: Seen walking with PT on 100%NRB. Was using HFO2. Refused BPAP. Says he feels well; says his breathing is good. Not reliable historian.    MEDICATIONS  (STANDING):  ALBUTerol/ipratropium for Nebulization 3 milliLiter(s) Nebulizer every 6 hours  ALPRAZolam 1 milliGRAM(s) Oral two times a day  aspirin enteric coated 81 milliGRAM(s) Oral daily  atorvastatin 40 milliGRAM(s) Oral at bedtime  azithromycin   Tablet 500 milliGRAM(s) Oral daily  clopidogrel Tablet 75 milliGRAM(s) Oral daily  dextrose 5%. 1000 milliLiter(s) (50 mL/Hr) IV Continuous <Continuous>  dextrose 50% Injectable 12.5 Gram(s) IV Push once  dextrose 50% Injectable 25 Gram(s) IV Push once  dextrose 50% Injectable 25 Gram(s) IV Push once  heparin  Injectable 5000 Unit(s) SubCutaneous every 8 hours  insulin lispro (HumaLOG) corrective regimen sliding scale   SubCutaneous three times a day before meals  lisinopril 5 milliGRAM(s) Oral daily  methylPREDNISolone sodium succinate Injectable 40 milliGRAM(s) IV Push every 6 hours  metoprolol tartrate 12.5 milliGRAM(s) Oral two times a day  nicotine - 21 mG/24Hr(s) Patch 1 patch Transdermal daily  nystatin    Suspension 052137 Unit(s) Oral four times a day  pantoprazole    Tablet 40 milliGRAM(s) Oral before breakfast      MEDICATIONS  (PRN):  acetaminophen   Tablet .. 650 milliGRAM(s) Oral every 6 hours PRN Temp greater or equal to 38C (100.4F), Moderate Pain (4 - 6)  dextrose 40% Gel 15 Gram(s) Oral once PRN Blood Glucose LESS THAN 70 milliGRAM(s)/deciliter  glucagon  Injectable 1 milliGRAM(s) IntraMuscular once PRN Glucose LESS THAN 70 milligrams/deciliter      Allergies    No Known Allergies    Intolerances        PAST MEDICAL & SURGICAL HISTORY:  Oxygen dependent  Gastroesophageal reflux disease, esophagitis presence not specified  Smoker  Bipolar 1 disorder  Coronary artery disease involving native coronary artery of native heart without angina pectoris  Type 2 diabetes mellitus without complication, with long-term current use of insulin  Pulmonary HTN  HLD (hyperlipidemia)  Stented coronary artery  COPD (chronic obstructive pulmonary disease)  No significant past surgical history      SOCIAL HISTORY  Smoking History: current      REVIEW OF SYSTEMS:    CONSTITUTIONAL:  No distress    HEENT:  Eyes:  No diplopia or blurred vision. ENT:  No earache, sore throat or runny nose.    CARDIOVASCULAR:  No pressure, squeezing, tightness, heaviness or aching about the chest; no palpitations.    RESPIRATORY:  per HPI     GASTROINTESTINAL:  No nausea, vomiting or diarrhea.    GENITOURINARY:  No dysuria, frequency or urgency.    NEUROLOGIC:  No paresthesias, fasciculations, seizures or weakness.    PSYCHIATRIC:  hx bipolar    Vital Signs Last 24 Hrs  T(C): 36.9 (06 Oct 2018 09:36), Max: 37.2 (05 Oct 2018 17:12)  T(F): 98.5 (06 Oct 2018 09:36), Max: 98.9 (05 Oct 2018 17:12)  HR: 87 (06 Oct 2018 09:36) (53 - 103)  BP: 98/59 (06 Oct 2018 09:36) (98/59 - 114/61)  BP(mean): --  RR: 20 (06 Oct 2018 09:36) (10 - 22)  SpO2: 97% (06 Oct 2018 09:36) (95% - 97%)    PHYSICAL EXAMINATION:    GENERAL: The patient is awake and alert in no apparent distress.     HEENT: Head is normocephalic and atraumatic.  mucous membranes are moist.    NECK: Supple.    LUNGS: scattered wheeze,  respirations unlabored    HEART: Regular rate and rhythm      ABDOMEN: Soft, nontender, and obese    EXTREMITIES: Without any cyanosis, clubbing, rash, lesions      NEUROLOGIC: Grossly intact.         Serum Pro-Brain Natriuretic Peptide: 1258 pg/mL (10-04-18 @ 06:42)  Serum Pro-Brain Natriuretic Peptide: 1242 pg/mL (10-03-18 @ 19:44)          MICROBIOLOGY:    RADIOLOGY & ADDITIONAL STUDIES:  < from: CT Angio Chest w/ IV Cont (10.05.18 @ 18:51) >     EXAM:  CT ANGIO CHEST (W)AW IC                          PROCEDURE DATE:  10/05/2018          INTERPRETATION:  CTA chest .  COMPARISON: None.  CLINICAL INFORMATION: chest pain, dyspnea.  TECHNIQUE: Contiguous axial 1.25 mm slice thickness images ofthe chest   were obtained after intravenous contrast administration utilizing PE   protocol.  Maximum intensity projection,(MIP) 3-D,  imaging was created and   interpreted.  100 mls of Omnipaque 300 was administered intravenously without   complication and 0 mls were discarded.    FINDINGS:  There are no pulmonary arterial filling defects to suggest pulmonary   embolism.    The mediastinum great vessels are normal.     There are no mediastinal masses or lymphadenopathy.     There is mild cardiomegaly with coronary artery calcifications. The   airway is patent showing normal caliber and contour.    There is LEFT lower lobe peripheral segmental airspace consolidation with   mild peribronchial lung lower lobe thickening and mucous plugging. .   RIGHT lung parenchyma and LEFT upper lobe remain clear..    There is no pleural effusion or pneumothorax.    Visualized upper abdominal viscera unremarkable.    The bones are normal.    IMPRESSION:    No evidence of pulmonary embolism.  LEFT lower lobe airspace consolidation concerning for infectious   pneumonia.                CHAU YAP M.D., ATTENDING RADIOLOGIST    < end of copied text >

## 2018-10-06 NOTE — PROGRESS NOTE ADULT - ASSESSMENT
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 Found to have a positive rhinovirus on viral panel    1) Acute on chronic hypercapnic and hypoxic respiratory failure - multifactorial (COPD Exacerbation, Viral Pneumonia and Acute Diastolic Heart Failure)   - RVP is positive for Rhinovirus  - Chronic Smoker  - Continue Solumedrol 40 mg q 12 hours  - DuoNebs and Albuterol PRN  - Continue Zithromax   - Continue High Flow. Wean Off FiO2 as tolerated.  - Pulmonary following  - Nicotine Patch for chronic smoking  2) Acute Diastolic Heart Failure  - Continue Lisinopril 5 mg and Lopressor 12.5 mg  - ECHO Prelim report as per cardio: Hyperdynamic LV EF >75. RV severely enlarged. RV function reduced. RA severely enlarged Trace MR. Mod TR. PASP 56mmHg.  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 Found to have a positive rhinovirus on viral panel    1) Acute on chronic hypercapnic and hypoxic respiratory failure - multifactorial (COPD Exacerbation, Viral Pneumonia and Acute Diastolic Heart Failure)   - RVP is positive for Rhinovirus  - Chronic Smoker  - Continue Solumedrol 40 mg q 12 hours  - DuoNebs and Albuterol PRN  - Continue Zithromax   - Continue High Flow. Wean Off FiO2 as tolerated.  - Nocturnal BIPAP  - Pulmonary following  - Nicotine Patch for chronic smoking  2) Acute Diastolic Heart Failure  - Continue Lisinopril 5 mg and Lopressor 12.5 mg  - ECHO Prelim report as per cardio: Hyperdynamic LV EF >75. RV severely enlarged. RV function reduced. RA severely enlarged Trace MR. Mod TR. PASP 56mmHg.  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 Found to have a positive rhinovirus on viral panel    1) Acute on chronic hypercapnic and hypoxic respiratory failure - multifactorial (COPD Exacerbation, Pulmonary HTN, Viral Pneumonia and Acute Diastolic Heart Failure)   - RVP is positive for Rhinovirus  - Chronic Smoker  - Continue Solumedrol 40 mg q 12 hours  - DuoNebs and Albuterol PRN  - Continue Zithromax   - Continue High Flow. Wean Off FiO2 as tolerated.  - Nocturnal BIPAP  - Pulmonary following  - Nicotine Patch for chronic smoking  2) Acute Diastolic Heart Failure  - Continue Lisinopril 5 mg and Lopressor 12.5 mg  - ECHO Prelim report as per cardio: Hyperdynamic LV EF >75. RV severely enlarged. RV function reduced. RA severely enlarged Trace MR. Mod TR. PASP 56mmHg.  3) CAD and HLD  - Continue Aspirin, Plavix, Lipitor and Metoprolol  4) DM 2 on Insulin  - Uncontrolled  - Change diet to Carb Consistent   - Accu checks and ISS  - Add Lantus 20 units  - Add Humalog for bedtime coverage  - Will consider premeals Humalog if persistently hyperglycemic   DVT Prophylaxis -- Lovenox 40 mg

## 2018-10-06 NOTE — PROGRESS NOTE ADULT - SUBJECTIVE AND OBJECTIVE BOX
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 Pt states he lives in a rented room in a home where everyone is sick with a cold. Pt reports waking up today to smoke after drinking nyquil for his congestion and dry cough and approx one hour later states he could not breathe despite using his oxygen. Pt denies any cp, palpitations, n/v/d, HA, fever, chills, lethargy, gu sx. Pt uses [facility] which is closed, states he is on a blood thinner that is not coumadin but does not know. Additional meds reconciled however pt does not know doses. (03 Oct 2018 22:29)    INTERVAL HPI/OVERNIGHT EVENTS:  Seen and examined at bedside. Feeling better. Still on High Flow with FiO2 of 70.  Denies chest pain, palpitations, shortness of breath, headache, dizziness, nausea, vomiting or abdominal pain.   No overnight issues.    Had Rapid Response on 10/4/18 for hypoxia.     Review of systems:   As per interval history otherwise unremarkable.      PHYSICAL EXAM:  Vital Signs   T(C): 36.9 (06 Oct 2018 09:36), Max: 37.2 (05 Oct 2018 17:12)  T(F): 98.5 (06 Oct 2018 09:36), Max: 98.9 (05 Oct 2018 17:12)  HR: 87 (06 Oct 2018 09:36) (53 - 103)  BP: 98/59 (06 Oct 2018 09:36) (98/59 - 114/61)  RR: 20 (06 Oct 2018 09:36) (20 - 22)  SpO2: 97% (06 Oct 2018 09:36) (96% - 97%)  General: Well developed. Well nourished. No acute distress  HEENT: PERRLA. EOMI. Clear conjunctivae. Moist mucus membrane. High Flow NC in place.   Neck: Supple. No JVD.   Chest: Decreased air entry at bases. No wheezing, rales or rhonchi. No chest wall tenderness.  Heart: Normal S1 & S2 with RRR. No murmur.   Abdomen: Soft. Non-tender. Non-distended. + BS  Ext: No pedal edema. No calf tenderness   Neuro: AAO x 3. No focal deficit. No speech disorder.  Skin: Warm and Dry  Psychiatry: Normal mood and affect      LABS:  Reviewed    CAPILLARY BLOOD GLUCOSE  POCT Blood Glucose.: 383 mg/dL (06 Oct 2018 11:38)  POCT Blood Glucose.: 409 mg/dL (06 Oct 2018 11:35)  POCT Blood Glucose.: 378 mg/dL (06 Oct 2018 07:43)  POCT Blood Glucose.: 309 mg/dL (05 Oct 2018 23:06)  POCT Blood Glucose.: 381 mg/dL (05 Oct 2018 17:06)    Hemoglobin A1C, Whole Blood: 10.8 % (10-04-18 @ 06:42)    Rapid Respiratory Viral Panel (10.04.18 @ 01:38)    Rapid RVP Result: Detected: The FilmArray RVP Rapid uses polymerase chain reaction (PCR) and melt curve analysis to screen for adenovirus; coronavirus HKU1, NL63, 229E, OC43; human metapneumovirus (hMPV); human enterovirus/rhinovirus (Entero/RV); influenza A; influenza A/H1;influenza A/H3; influenza  A/H1-2009; influenza B; parainfluenza viruses 1, 2, 3, 4; respiratory syncytial virus; Bordetella pertussis; Mycoplasma pneumoniae; and Chlamydophila pneumoniae.  Entero/Rhinovirus (RapRVP): Detected      RADIOLOGY & ADDITIONAL TESTS:  Xray Chest 2 Views PA/Lat (10.03.18 @ 17:46)  FINDINGS:       The lungs are hyperaerated with probable emphysematous changes. There is no acute parenchymal consolidation.  There is no pleural effusion. The cardiomediastinal silhouette is within normal limits.  There is thoracic spondylosis.    IMPRESSION:  Clear lungs.    CT Angio Chest w/ IV Cont (10.05.18 @ 18:51)  No evidence of pulmonary embolism.  LEFT lower lobe airspace consolidation concerning for infectious pneumonia.

## 2018-10-07 LAB
ANION GAP SERPL CALC-SCNC: 5 MMOL/L — SIGNIFICANT CHANGE UP (ref 5–17)
ANISOCYTOSIS BLD QL: SLIGHT — SIGNIFICANT CHANGE UP
BUN SERPL-MCNC: 29 MG/DL — HIGH (ref 8–20)
CALCIUM SERPL-MCNC: 9 MG/DL — SIGNIFICANT CHANGE UP (ref 8.6–10.2)
CHLORIDE SERPL-SCNC: 93 MMOL/L — LOW (ref 98–107)
CO2 SERPL-SCNC: 41 MMOL/L — HIGH (ref 22–29)
CREAT SERPL-MCNC: 0.51 MG/DL — SIGNIFICANT CHANGE UP (ref 0.5–1.3)
EOSINOPHIL # BLD AUTO: 0 K/UL — SIGNIFICANT CHANGE UP (ref 0–0.5)
EOSINOPHIL NFR BLD AUTO: 0 % — SIGNIFICANT CHANGE UP (ref 0–5)
GLUCOSE BLDC GLUCOMTR-MCNC: 210 MG/DL — HIGH (ref 70–99)
GLUCOSE BLDC GLUCOMTR-MCNC: 317 MG/DL — HIGH (ref 70–99)
GLUCOSE BLDC GLUCOMTR-MCNC: 338 MG/DL — HIGH (ref 70–99)
GLUCOSE BLDC GLUCOMTR-MCNC: 346 MG/DL — HIGH (ref 70–99)
GLUCOSE BLDC GLUCOMTR-MCNC: 348 MG/DL — HIGH (ref 70–99)
GLUCOSE BLDC GLUCOMTR-MCNC: 424 MG/DL — HIGH (ref 70–99)
GLUCOSE SERPL-MCNC: 309 MG/DL — HIGH (ref 70–115)
HCT VFR BLD CALC: 47.8 % — SIGNIFICANT CHANGE UP (ref 42–52)
HGB BLD-MCNC: 14.1 G/DL — SIGNIFICANT CHANGE UP (ref 14–18)
LYMPHOCYTES # BLD AUTO: 0.9 K/UL — LOW (ref 1–4.8)
LYMPHOCYTES # BLD AUTO: 10.8 % — LOW (ref 20–55)
MACROCYTES BLD QL: SLIGHT — SIGNIFICANT CHANGE UP
MAGNESIUM SERPL-MCNC: 2.3 MG/DL — SIGNIFICANT CHANGE UP (ref 1.6–2.6)
MCHC RBC-ENTMCNC: 28.1 PG — SIGNIFICANT CHANGE UP (ref 27–31)
MCHC RBC-ENTMCNC: 29.5 G/DL — LOW (ref 32–36)
MCV RBC AUTO: 95.2 FL — HIGH (ref 80–94)
MICROCYTES BLD QL: SLIGHT — SIGNIFICANT CHANGE UP
MONOCYTES # BLD AUTO: 1 K/UL — HIGH (ref 0–0.8)
MONOCYTES NFR BLD AUTO: 11.9 % — HIGH (ref 3–10)
NEUTROPHILS # BLD AUTO: 6.6 K/UL — SIGNIFICANT CHANGE UP (ref 1.8–8)
NEUTROPHILS NFR BLD AUTO: 76.9 % — HIGH (ref 37–73)
PLAT MORPH BLD: NORMAL — SIGNIFICANT CHANGE UP
PLATELET # BLD AUTO: 137 K/UL — LOW (ref 150–400)
POTASSIUM SERPL-MCNC: 5.2 MMOL/L — SIGNIFICANT CHANGE UP (ref 3.5–5.3)
POTASSIUM SERPL-SCNC: 5.2 MMOL/L — SIGNIFICANT CHANGE UP (ref 3.5–5.3)
RBC # BLD: 5.02 M/UL — SIGNIFICANT CHANGE UP (ref 4.6–6.2)
RBC # FLD: 14.9 % — SIGNIFICANT CHANGE UP (ref 11–15.6)
RBC BLD AUTO: ABNORMAL
SODIUM SERPL-SCNC: 139 MMOL/L — SIGNIFICANT CHANGE UP (ref 135–145)
WBC # BLD: 8.5 K/UL — SIGNIFICANT CHANGE UP (ref 4.8–10.8)
WBC # FLD AUTO: 8.5 K/UL — SIGNIFICANT CHANGE UP (ref 4.8–10.8)

## 2018-10-07 PROCEDURE — 99233 SBSQ HOSP IP/OBS HIGH 50: CPT

## 2018-10-07 RX ORDER — INSULIN GLARGINE 100 [IU]/ML
40 INJECTION, SOLUTION SUBCUTANEOUS EVERY MORNING
Qty: 0 | Refills: 0 | Status: DISCONTINUED | OUTPATIENT
Start: 2018-10-08 | End: 2018-10-08

## 2018-10-07 RX ORDER — INSULIN GLARGINE 100 [IU]/ML
20 INJECTION, SOLUTION SUBCUTANEOUS ONCE
Qty: 0 | Refills: 0 | Status: COMPLETED | OUTPATIENT
Start: 2018-10-07 | End: 2018-10-07

## 2018-10-07 RX ADMIN — Medication 3 MILLILITER(S): at 20:20

## 2018-10-07 RX ADMIN — Medication 1 PATCH: at 12:14

## 2018-10-07 RX ADMIN — PANTOPRAZOLE SODIUM 40 MILLIGRAM(S): 20 TABLET, DELAYED RELEASE ORAL at 05:28

## 2018-10-07 RX ADMIN — Medication 60 MILLIGRAM(S): at 17:56

## 2018-10-07 RX ADMIN — Medication 500000 UNIT(S): at 17:57

## 2018-10-07 RX ADMIN — Medication 3 MILLILITER(S): at 03:58

## 2018-10-07 RX ADMIN — INSULIN GLARGINE 20 UNIT(S): 100 INJECTION, SOLUTION SUBCUTANEOUS at 08:25

## 2018-10-07 RX ADMIN — Medication 1 PATCH: at 12:15

## 2018-10-07 RX ADMIN — Medication 1 MILLIGRAM(S): at 18:00

## 2018-10-07 RX ADMIN — Medication 8: at 12:13

## 2018-10-07 RX ADMIN — Medication 40 MILLIGRAM(S): at 05:28

## 2018-10-07 RX ADMIN — Medication 500000 UNIT(S): at 12:13

## 2018-10-07 RX ADMIN — INSULIN GLARGINE 20 UNIT(S): 100 INJECTION, SOLUTION SUBCUTANEOUS at 12:10

## 2018-10-07 RX ADMIN — Medication 8: at 17:57

## 2018-10-07 RX ADMIN — Medication 3 MILLILITER(S): at 15:13

## 2018-10-07 RX ADMIN — Medication 1 MILLIGRAM(S): at 05:30

## 2018-10-07 RX ADMIN — Medication 12.5 MILLIGRAM(S): at 17:57

## 2018-10-07 RX ADMIN — AZITHROMYCIN 250 MILLIGRAM(S): 500 TABLET, FILM COATED ORAL at 17:57

## 2018-10-07 RX ADMIN — Medication 8: at 08:24

## 2018-10-07 RX ADMIN — ATORVASTATIN CALCIUM 40 MILLIGRAM(S): 80 TABLET, FILM COATED ORAL at 22:31

## 2018-10-07 RX ADMIN — ENOXAPARIN SODIUM 40 MILLIGRAM(S): 100 INJECTION SUBCUTANEOUS at 12:14

## 2018-10-07 RX ADMIN — Medication 4: at 22:31

## 2018-10-07 RX ADMIN — Medication 3 MILLILITER(S): at 09:11

## 2018-10-07 RX ADMIN — Medication 500000 UNIT(S): at 05:28

## 2018-10-07 RX ADMIN — Medication 81 MILLIGRAM(S): at 12:13

## 2018-10-07 RX ADMIN — CLOPIDOGREL BISULFATE 75 MILLIGRAM(S): 75 TABLET, FILM COATED ORAL at 12:13

## 2018-10-07 NOTE — PROGRESS NOTE ADULT - ASSESSMENT
-AECOPD; underlying stage 4 dz  -Hx pulm htn  -Acute on chronic hypoxic resp failure  -Chronic hypercarbic resp failure; pt refusing bpap  -Diastolic CHF  -Rhinovirus infection  -Abnormal CT chest; infiltrate vs atelectasis. No fever, no wbc  -Smoker    RECC:   HFO2. Titrate down to get to level he can use at home. Noct bpap encouraged. Continue IV steroids; taper. Nebs. Smoking cessation a must. DVT prophylaxis. Need to follow CT chest to resolution at outpt. -AECOPD; underlying stage 4 dz  -Hx pulm htn  -Acute on chronic hypoxic resp failure  -Chronic hypercarbic resp failure; pt refusing bpap  -Diastolic CHF  -Rhinovirus infection  -Abnormal CT chest; infiltrate vs atelectasis. No fever, no wbc  -Smoker    RECC:   HFO2. Titrate down to get to level he can use at home. Noct bpap encouraged. Taper to PO prednisone. Nebs. Smoking cessation a must. DVT prophylaxis. Need to follow CT chest to resolution at outpt.

## 2018-10-07 NOTE — PROGRESS NOTE ADULT - ASSESSMENT
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 Found to have a positive rhinovirus on viral panel.  Had Rapid Response on 10/4/18 for hypoxia.     1) Acute on chronic hypercapnic and hypoxic respiratory failure - multifactorial (COPD Exacerbation, Pulmonary HTN, Viral Pneumonia and Acute Diastolic Heart Failure)   - RVP is positive for Rhinovirus  - Chronic Smoker  - Solumedrol switched to Prednisone 60 mg daily  - DuoNebs and Albuterol PRN  - Continue Zithromax   - Continue High Flow. Wean Off FiO2 as tolerated.  - Nocturnal BIPAP  - Pulmonary following  - Nicotine Patch for chronic smoking  2) Acute Diastolic Heart Failure  - Continue Lisinopril 5 mg and Lopressor 12.5 mg  - ECHO Prelim report as per cardio: Hyperdynamic LV EF >75. RV severely enlarged. RV function reduced. RA severely enlarged Trace MR. Mod TR. PASP 56mmHg.  3) CAD and HLD  - Continue Aspirin, Plavix, Lipitor and Metoprolol  4) DM 2 on Insulin  - Uncontrolled  - Change diet to Carb Consistent   - Accu checks and ISS  - Increase Lantus to 40 units  - Will consider premeals Humalog if persistently hyperglycemic   DVT Prophylaxis -- Lovenox 40 mg

## 2018-10-07 NOTE — PROGRESS NOTE ADULT - SUBJECTIVE AND OBJECTIVE BOX
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 Pt states he lives in a rented room in a home where everyone is sick with a cold. Pt reports waking up today to smoke after drinking nyquil for his congestion and dry cough and approx one hour later states he could not breathe despite using his oxygen. Pt denies any cp, palpitations, n/v/d, HA, fever, chills, lethargy, gu sx. Pt uses [facility] which is closed, states he is on a blood thinner that is not coumadin but does not know. Additional meds reconciled however pt does not know doses. (03 Oct 2018 22:29)    INTERVAL HPI/OVERNIGHT EVENTS:  Feeling better. No overnight issues. Still on High Flow with FiO2 of 65.  Denies chest pain, palpitations, shortness of breath, headache, dizziness, nausea, vomiting or abdominal pain.     Review of systems:   As per interval history otherwise unremarkable.    PHYSICAL EXAM:  Vital Signs   T(C): 36.9 (07 Oct 2018 04:35), Max: 36.9 (07 Oct 2018 04:35)  T(F): 98.4 (07 Oct 2018 04:35), Max: 98.4 (07 Oct 2018 04:35)  HR: 88 (07 Oct 2018 04:35) (77 - 94)  BP: 96/66 (07 Oct 2018 04:35) (96/66 - 108/71)  RR: 20 (07 Oct 2018 04:35) (20 - 22)  SpO2: 96% (07 Oct 2018 09:00) (93% - 100%)  General: Well developed. Well nourished. No acute distress  HEENT: PERRLA. EOMI. Clear conjunctivae. Moist mucus membrane. High Flow NC in place.   Neck: Supple. No JVD.   Chest: Decreased air entry at bilaterally. No wheezing, rales or rhonchi. No chest wall tenderness.  Heart: Normal S1 & S2 with RRR. No murmur.   Abdomen: Soft. Non-tender. Non-distended. + BS  Ext: No pedal edema. No calf tenderness   Neuro: AAO x 3. No focal deficit. No speech disorder.  Skin: Warm and Dry  Psychiatry: Normal mood and affect    LABS:  CAPILLARY BLOOD GLUCOSE  POCT Blood Glucose.: 317 mg/dL (07 Oct 2018 11:30)  POCT Blood Glucose.: 338 mg/dL (07 Oct 2018 08:07)  POCT Blood Glucose.: 424 mg/dL (06 Oct 2018 22:22)  POCT Blood Glucose.: 346 mg/dL (06 Oct 2018 16:25)                        14.1   8.5   )-----------( 137      ( 07 Oct 2018 08:32 )             47.8     10-07    139  |  93<L>  |  29.0<H>  ----------------------------<  309<H>  5.2   |  41.0<H>  |  0.51    Ca    9.0      07 Oct 2018 08:32  Mg     2.3     10-07    Hemoglobin A1C, Whole Blood: 10.8 % (10-04-18 @ 06:42)    Rapid Respiratory Viral Panel (10.04.18 @ 01:38)  Rapid RVP Result: Detected: The FilmArray RVP Rapid uses polymerase chain reaction (PCR) and melt curve analysis to screen for adenovirus; coronavirus HKU1, NL63, 229E, OC43; human metapneumovirus (hMPV); human enterovirus/rhinovirus (Entero/RV); influenza A; influenza A/H1;influenza A/H3; influenza  A/H1-2009; influenza B; parainfluenza viruses 1, 2, 3, 4; respiratory syncytial virus; Bordetella pertussis; Mycoplasma pneumoniae; and Chlamydophila pneumoniae.  Entero/Rhinovirus (RapRVP): Detected      RADIOLOGY & ADDITIONAL TESTS:  Xray Chest 2 Views PA/Lat (10.03.18 @ 17:46)  FINDINGS:       The lungs are hyperaerated with probable emphysematous changes. There is no acute parenchymal consolidation.  There is no pleural effusion. The cardiomediastinal silhouette is within normal limits.  There is thoracic spondylosis.    IMPRESSION:  Clear lungs.    CT Angio Chest w/ IV Cont (10.05.18 @ 18:51)  No evidence of pulmonary embolism.  LEFT lower lobe airspace consolidation concerning for infectious pneumonia.

## 2018-10-07 NOTE — PROGRESS NOTE ADULT - SUBJECTIVE AND OBJECTIVE BOX
PULMONARY PROGRESS NOTE      DEVIKA [id number]    Patient is a 58y old  Male who presents with a chief complaint of shortness of breath (06 Oct 2018 15:05)      INTERVAL HPI/OVERNIGHT EVENTS: Says he is feeling well. Anxious to go home. Still on HFO2. Refusing noct bpap.    MEDICATIONS  (STANDING):  ALBUTerol/ipratropium for Nebulization 3 milliLiter(s) Nebulizer every 6 hours  ALPRAZolam 1 milliGRAM(s) Oral two times a day  aspirin enteric coated 81 milliGRAM(s) Oral daily  atorvastatin 40 milliGRAM(s) Oral at bedtime  azithromycin   Tablet 250 milliGRAM(s) Oral daily  clopidogrel Tablet 75 milliGRAM(s) Oral daily  dextrose 5%. 1000 milliLiter(s) (50 mL/Hr) IV Continuous <Continuous>  dextrose 50% Injectable 12.5 Gram(s) IV Push once  dextrose 50% Injectable 25 Gram(s) IV Push once  dextrose 50% Injectable 25 Gram(s) IV Push once  enoxaparin Injectable 40 milliGRAM(s) SubCutaneous daily  insulin glargine Injectable (LANTUS) 20 Unit(s) SubCutaneous every morning  insulin lispro (HumaLOG) corrective regimen sliding scale   SubCutaneous Before meals and at bedtime  lisinopril 5 milliGRAM(s) Oral daily  methylPREDNISolone sodium succinate Injectable 40 milliGRAM(s) IV Push every 12 hours  metoprolol tartrate 12.5 milliGRAM(s) Oral two times a day  nicotine - 21 mG/24Hr(s) Patch 1 patch Transdermal daily  nystatin    Suspension 092252 Unit(s) Oral four times a day  pantoprazole    Tablet 40 milliGRAM(s) Oral before breakfast      MEDICATIONS  (PRN):  acetaminophen   Tablet .. 650 milliGRAM(s) Oral every 6 hours PRN Temp greater or equal to 38C (100.4F), Moderate Pain (4 - 6)  ALBUTerol    0.083% 2.5 milliGRAM(s) Nebulizer every 4 hours PRN Shortness of Breath and/or Wheezing  dextrose 40% Gel 15 Gram(s) Oral once PRN Blood Glucose LESS THAN 70 milliGRAM(s)/deciliter  glucagon  Injectable 1 milliGRAM(s) IntraMuscular once PRN Glucose LESS THAN 70 milligrams/deciliter      Allergies    No Known Allergies    Intolerances        PAST MEDICAL & SURGICAL HISTORY:  Oxygen dependent  Gastroesophageal reflux disease, esophagitis presence not specified  Smoker  Bipolar 1 disorder  Coronary artery disease involving native coronary artery of native heart without angina pectoris  Type 2 diabetes mellitus without complication, with long-term current use of insulin  Pulmonary HTN  HLD (hyperlipidemia)  Stented coronary artery  COPD (chronic obstructive pulmonary disease)  No significant past surgical history      SOCIAL HISTORY  Smoking History: current      REVIEW OF SYSTEMS:    CONSTITUTIONAL:  No distress    HEENT:  Eyes:  No diplopia or blurred vision. ENT:  No earache, sore throat or runny nose.    CARDIOVASCULAR:  No pressure, squeezing, tightness, heaviness or aching about the chest; no palpitations.    RESPIRATORY:  per HPI     GASTROINTESTINAL:  No nausea, vomiting or diarrhea.    GENITOURINARY:  No dysuria, frequency or urgency.    NEUROLOGIC:  No paresthesias, fasciculations, seizures or weakness.    PSYCHIATRIC:  No disorder of thought or mood.    Vital Signs Last 24 Hrs  T(C): 36.9 (07 Oct 2018 04:35), Max: 36.9 (07 Oct 2018 04:35)  T(F): 98.4 (07 Oct 2018 04:35), Max: 98.4 (07 Oct 2018 04:35)  HR: 88 (07 Oct 2018 04:35) (77 - 94)  BP: 96/66 (07 Oct 2018 04:35) (96/66 - 108/71)  BP(mean): --  RR: 20 (07 Oct 2018 04:35) (20 - 22)  SpO2: 96% (07 Oct 2018 09:00) (93% - 100%)    PHYSICAL EXAMINATION:    GENERAL: The patient is awake and alert in no apparent distress.     HEENT: Head is normocephalic and atraumatic.  Mucous membranes are moist.    NECK: Supple.    LUNGS: Clear to auscultation without wheezing, rales or rhonchi; respirations unlabored    HEART: Regular rate and rhythm      ABDOMEN: Soft, nontender, and obese.      EXTREMITIES: Without any cyanosis, clubbing, rash, lesions or edema.    NEUROLOGIC: Grossly intact.    LABS:                        14.1   8.5   )-----------( 137      ( 07 Oct 2018 08:32 )             47.8     10-07    139  |  93<L>  |  29.0<H>  ----------------------------<  309<H>  5.2   |  41.0<H>  |  0.51    Ca    9.0      07 Oct 2018 08:32  Mg     2.3     10-07              RADIOLOGY & ADDITIONAL STUDIES:  < from: CT Angio Chest w/ IV Cont (10.05.18 @ 18:51) >     EXAM:  CT ANGIO CHEST (W)AW IC                          PROCEDURE DATE:  10/05/2018          INTERPRETATION:  CTA chest .  COMPARISON: None.  CLINICAL INFORMATION: chest pain, dyspnea.  TECHNIQUE: Contiguous axial 1.25 mm slice thickness images ofthe chest   were obtained after intravenous contrast administration utilizing PE   protocol.  Maximum intensity projection,(MIP) 3-D,  imaging was created and   interpreted.  100 mls of Omnipaque 300 was administered intravenously without   complication and 0 mls were discarded.    FINDINGS:  There are no pulmonary arterial filling defects to suggest pulmonary   embolism.    The mediastinum great vessels are normal.     There are no mediastinal masses or lymphadenopathy.     There is mild cardiomegaly with coronary artery calcifications. The   airway is patent showing normal caliber and contour.    There is LEFT lower lobe peripheral segmental airspace consolidation with   mild peribronchial lung lower lobe thickening and mucous plugging. .   RIGHT lung parenchyma and LEFT upper lobe remain clear..    There is no pleural effusion or pneumothorax.    Visualized upper abdominal viscera unremarkable.    The bones are normal.    IMPRESSION:    No evidence of pulmonary embolism.  LEFT lower lobe airspace consolidation concerning for infectious   pneumonia.                CHAU YAP M.D., ATTENDING RADIOLOGIST    < end of copied text >

## 2018-10-08 LAB
ANION GAP SERPL CALC-SCNC: 4 MMOL/L — LOW (ref 5–17)
BUN SERPL-MCNC: 25 MG/DL — HIGH (ref 8–20)
CALCIUM SERPL-MCNC: 8.9 MG/DL — SIGNIFICANT CHANGE UP (ref 8.6–10.2)
CHLORIDE SERPL-SCNC: 90 MMOL/L — LOW (ref 98–107)
CO2 SERPL-SCNC: 42 MMOL/L — HIGH (ref 22–29)
CREAT SERPL-MCNC: 0.54 MG/DL — SIGNIFICANT CHANGE UP (ref 0.5–1.3)
GLUCOSE BLDC GLUCOMTR-MCNC: 194 MG/DL — HIGH (ref 70–99)
GLUCOSE BLDC GLUCOMTR-MCNC: 198 MG/DL — HIGH (ref 70–99)
GLUCOSE BLDC GLUCOMTR-MCNC: 301 MG/DL — HIGH (ref 70–99)
GLUCOSE BLDC GLUCOMTR-MCNC: 304 MG/DL — HIGH (ref 70–99)
GLUCOSE BLDC GLUCOMTR-MCNC: 355 MG/DL — HIGH (ref 70–99)
GLUCOSE SERPL-MCNC: 351 MG/DL — HIGH (ref 70–115)
MAGNESIUM SERPL-MCNC: 2.1 MG/DL — SIGNIFICANT CHANGE UP (ref 1.6–2.6)
POTASSIUM SERPL-MCNC: 5.1 MMOL/L — SIGNIFICANT CHANGE UP (ref 3.5–5.3)
POTASSIUM SERPL-SCNC: 5.1 MMOL/L — SIGNIFICANT CHANGE UP (ref 3.5–5.3)
SODIUM SERPL-SCNC: 136 MMOL/L — SIGNIFICANT CHANGE UP (ref 135–145)

## 2018-10-08 PROCEDURE — 99233 SBSQ HOSP IP/OBS HIGH 50: CPT

## 2018-10-08 PROCEDURE — 99232 SBSQ HOSP IP/OBS MODERATE 35: CPT

## 2018-10-08 RX ORDER — INSULIN GLARGINE 100 [IU]/ML
10 INJECTION, SOLUTION SUBCUTANEOUS ONCE
Qty: 0 | Refills: 0 | Status: COMPLETED | OUTPATIENT
Start: 2018-10-08 | End: 2018-10-08

## 2018-10-08 RX ORDER — INSULIN LISPRO 100/ML
3 VIAL (ML) SUBCUTANEOUS
Qty: 0 | Refills: 0 | Status: DISCONTINUED | OUTPATIENT
Start: 2018-10-08 | End: 2018-10-10

## 2018-10-08 RX ORDER — INSULIN GLARGINE 100 [IU]/ML
45 INJECTION, SOLUTION SUBCUTANEOUS EVERY MORNING
Qty: 0 | Refills: 0 | Status: DISCONTINUED | OUTPATIENT
Start: 2018-10-09 | End: 2018-10-11

## 2018-10-08 RX ORDER — SPIRONOLACTONE 25 MG/1
12.5 TABLET, FILM COATED ORAL DAILY
Qty: 0 | Refills: 0 | Status: DISCONTINUED | OUTPATIENT
Start: 2018-10-08 | End: 2018-10-11

## 2018-10-08 RX ORDER — SACCHAROMYCES BOULARDII 250 MG
250 POWDER IN PACKET (EA) ORAL
Qty: 0 | Refills: 0 | Status: DISCONTINUED | OUTPATIENT
Start: 2018-10-08 | End: 2018-11-01

## 2018-10-08 RX ADMIN — INSULIN GLARGINE 40 UNIT(S): 100 INJECTION, SOLUTION SUBCUTANEOUS at 08:39

## 2018-10-08 RX ADMIN — CLOPIDOGREL BISULFATE 75 MILLIGRAM(S): 75 TABLET, FILM COATED ORAL at 12:11

## 2018-10-08 RX ADMIN — ENOXAPARIN SODIUM 40 MILLIGRAM(S): 100 INJECTION SUBCUTANEOUS at 12:14

## 2018-10-08 RX ADMIN — Medication 2: at 22:54

## 2018-10-08 RX ADMIN — Medication 500000 UNIT(S): at 05:48

## 2018-10-08 RX ADMIN — Medication 60 MILLIGRAM(S): at 05:53

## 2018-10-08 RX ADMIN — Medication 1 PATCH: at 12:15

## 2018-10-08 RX ADMIN — Medication 10: at 12:08

## 2018-10-08 RX ADMIN — Medication 1 MILLIGRAM(S): at 17:43

## 2018-10-08 RX ADMIN — Medication 3 MILLILITER(S): at 02:29

## 2018-10-08 RX ADMIN — Medication 3 MILLILITER(S): at 09:38

## 2018-10-08 RX ADMIN — Medication 500000 UNIT(S): at 00:21

## 2018-10-08 RX ADMIN — LISINOPRIL 5 MILLIGRAM(S): 2.5 TABLET ORAL at 05:49

## 2018-10-08 RX ADMIN — Medication 250 MILLIGRAM(S): at 17:43

## 2018-10-08 RX ADMIN — Medication 81 MILLIGRAM(S): at 12:11

## 2018-10-08 RX ADMIN — ATORVASTATIN CALCIUM 40 MILLIGRAM(S): 80 TABLET, FILM COATED ORAL at 22:54

## 2018-10-08 RX ADMIN — Medication 12.5 MILLIGRAM(S): at 05:49

## 2018-10-08 RX ADMIN — Medication 3 MILLILITER(S): at 21:31

## 2018-10-08 RX ADMIN — Medication 500000 UNIT(S): at 17:43

## 2018-10-08 RX ADMIN — Medication 500000 UNIT(S): at 12:08

## 2018-10-08 RX ADMIN — Medication 8: at 17:27

## 2018-10-08 RX ADMIN — AZITHROMYCIN 250 MILLIGRAM(S): 500 TABLET, FILM COATED ORAL at 12:11

## 2018-10-08 RX ADMIN — Medication 3 UNIT(S): at 16:45

## 2018-10-08 RX ADMIN — Medication 500000 UNIT(S): at 22:55

## 2018-10-08 RX ADMIN — INSULIN GLARGINE 10 UNIT(S): 100 INJECTION, SOLUTION SUBCUTANEOUS at 16:45

## 2018-10-08 RX ADMIN — Medication 8: at 08:39

## 2018-10-08 RX ADMIN — Medication 3 MILLILITER(S): at 15:32

## 2018-10-08 RX ADMIN — Medication 1 MILLIGRAM(S): at 05:47

## 2018-10-08 RX ADMIN — SPIRONOLACTONE 12.5 MILLIGRAM(S): 25 TABLET, FILM COATED ORAL at 19:22

## 2018-10-08 RX ADMIN — PANTOPRAZOLE SODIUM 40 MILLIGRAM(S): 20 TABLET, DELAYED RELEASE ORAL at 05:48

## 2018-10-08 NOTE — PROGRESS NOTE ADULT - ASSESSMENT
57 yo M with pulmonary HTN, GERD, Bipolar disorder, HTN, HLD, daily tobacco smoker T2DM on insulin,   CAD s/p PCI in 2016 (RCA?) by [place]. Patrice Belle [place] ([place] called office not open)  severe COPD/emphysema on home oxygen 2L admitted with acute respiratory failure, hypoxia and acute diastolic  HF    A/  Acute Diastolic Heart  failure  Echo Prelim: Hyperdynamic LV EF >75. RV severely enlarged. RV function reduced. RA severely enlarged Trace MR. Mod TR. PASP 56mmHg.  COPD; underlying stage 4 dz  pHTN  Acute on chronic hypoxic resp failure  Chronic hypercarbic resp failure  Rhinovirus infection  Smoker        P/  HFO2.  BIPAP: refusing BIPAP  Added sm dose of spironolactone        CARDIO MEDS  atorvastatin 40 milliGRAM(s) Oral at bedtime  aspirin enteric coated 81 milliGRAM(s) Oral daily  clopidogrel Tablet 75 milliGRAM(s) Oral daily  lisinopril 5 milliGRAM(s) Oral daily  metoprolol tartrate 12.5 milliGRAM(s) Oral two times a day

## 2018-10-08 NOTE — CHART NOTE - NSCHARTNOTEFT_GEN_A_CORE
Aware consult for diabetes education- hgba1c 10.8%.  Pt reports having diabetes for 5 years and takes Glipizide with breakfast/dinner and 2.5 L of Lantus bid via pen- questioned dosing and pt insists 2.5L.  As per chart pt takes Lantus 8 u bid and Glipizide twice a day.  Pt states SMBG 2x daily with levels consistently in the 200's. Pt denies being on Prednisone pta.  Attempted to start diabetes education, but pt began to fall asleep twice during interview.  Discussed with RN.  HARRISON CDE to re-attempt education when pt is more alert.    Recommendations:  Pt will inject all insulin doses while in the hospital using pre filled insulin syringe and RN supervision  Pt will demonstrate correct technique for finger sticks with RN supervision  Continue with diabetes self management education Aware consult for diabetes education- hgba1c 10.8%.  Pt reports having diabetes for 5 years and takes Glipizide with breakfast/dinner and 2.5 L of Lantus bid via pen- questioned dosing and pt insists 2.5L.  As per chart pt takes Lantus 8 u bid and Glipizide twice a day.  Pt states SMBG 2x daily with levels consistently in the 200's. Pt denies being on Prednisone pta.  Attempted to start diabetes education, but pt began to fall asleep twice during interview.  Discussed with RN.  HARRISON CDE to re-attempt education when pt is more alert.    Recommendations:  Consider starting pre-meal insulin   Pt will inject all insulin doses while in the hospital using pre filled insulin syringe and RN supervision  Pt will demonstrate correct technique for finger sticks with RN supervision  Continue with diabetes self management education

## 2018-10-08 NOTE — PROGRESS NOTE ADULT - ASSESSMENT
Imp--COPD with hypoxemia--Pneumonia vs mucus plugging  Plan--continue nebs, abx prednisone  Mobilize pt  Lower O2 as tolerated.

## 2018-10-08 NOTE — PROGRESS NOTE ADULT - ASSESSMENT
Pt is a 57 yo male with a pmh/o pulmonary HTN, GERD, Bipolar disorder, HTN, HLD, current everyday 0.5 PPD smoker DMII on insulin severe COPD/emphysema on home oxygen 2L (which his sister bought after cardiology in [place] recommended it after last hospitalization) and CHF with last exacerbation in 2015 with PCI intervention (states it was a balloon for a 95% occlusion of his right pulmonary artery?) for which he was hospitalized in [place], who presents today with complaints of worsening shortness of breath and congestion. Found to have a positive rhinovirus on viral panel.  Had Rapid Response on 10/4/18 for hypoxia.     1) Acute on chronic hypercapnic and hypoxic respiratory failure - multifactorial (COPD Exacerbation, Pulmonary HTN, Viral Pneumonia and Acute Diastolic Heart Failure)   - RVP is positive for Rhinovirus  - Chronic Smoker  - Continue Prednisone 60 mg daily  - DuoNebs and Albuterol PRN  - Continue Zithromax   - Continue High Flow. Wean Off FiO2 as tolerated.  - Nocturnal BIPAP  - Pulmonary following  - Nicotine Patch for chronic smoking  2) Acute Diastolic Heart Failure  - Continue Lisinopril 5 mg and Lopressor 12.5 mg  - Cardiology added Aldactone 12.5 mg daily  - ECHO Prelim report as per cardio: Hyperdynamic LV EF >75. RV severely enlarged. RV function reduced. RA severely enlarged Trace MR. Mod TR. PASP 56mmHg.  3) CAD and HLD  - Continue Aspirin, Plavix, Lipitor and Metoprolol  4) DM 2 on Insulin  - Uncontrolled  - Carb Consistent Diet   - Accu checks and ISS  - Continue Lantus 40 units  - Add pre-meals Humalog 3 units   DVT Prophylaxis -- Lovenox 40 mg Pt is a 57 yo male with a pmh/o pulmonary HTN, GERD, Bipolar disorder, HTN, HLD, current everyday 0.5 PPD smoker DMII on insulin severe COPD/emphysema on home oxygen 2L (which his sister bought after cardiology in [place] recommended it after last hospitalization) and CHF with last exacerbation in 2015 with PCI intervention (states it was a balloon for a 95% occlusion of his right pulmonary artery?) for which he was hospitalized in [place], who presents today with complaints of worsening shortness of breath and congestion. Found to have a positive rhinovirus on viral panel.  Had Rapid Response on 10/4/18 for hypoxia.     1) Acute on chronic hypercapnic and hypoxic respiratory failure - multifactorial (COPD Exacerbation, Pulmonary HTN, Viral Pneumonia and Acute Diastolic Heart Failure)   - RVP is positive for Rhinovirus  - Chronic Smoker  - Continue Prednisone 60 mg daily  - DuoNebs and Albuterol PRN  - Continue Zithromax   - Continue High Flow. Wean Off FiO2 as tolerated.  - Nocturnal BIPAP  - Pulmonary following  - Nicotine Patch for chronic smoking  2) Acute Diastolic Heart Failure  - Continue Lisinopril 5 mg and Lopressor 12.5 mg  - Cardiology added Aldactone 12.5 mg daily  - ECHO Prelim report as per cardio: Hyperdynamic LV EF >75. RV severely enlarged. RV function reduced. RA severely enlarged Trace MR. Mod TR. PASP 56mmHg.  3) CAD and HLD  - Continue Aspirin, Plavix, Lipitor and Metoprolol  4) DM 2 on Insulin  - Uncontrolled  - Carb Consistent Diet   - Accu checks and ISS  - Increase Lantus to 45 units  - Add pre-meals Humalog 3 units   DVT Prophylaxis -- Lovenox 40 mg

## 2018-10-08 NOTE — PROGRESS NOTE ADULT - SUBJECTIVE AND OBJECTIVE BOX
CARDIOLOGY PROGRESS NOTE   ([facility])                                                                                                        Subjective: laying in bed, nad, no cp, slept comfortable    Vitals:  T(C): 36.4 (10-08-18 @ 04:45), Max: 37 (10-07-18 @ 21:20)  HR: 71 (10-08-18 @ 04:45) (71 - 78)  BP: 109/644 (10-08-18 @ 04:45) (109/74 - 114/76)  RR: 18 (10-07-18 @ 21:20) (18 - 19)  SpO2: 98% (10-07-18 @ 21:20) (96% - 100%)  Wt(kg): --  I&O's Summary    07 Oct 2018 07:01  -  08 Oct 2018 07:00  --------------------------------------------------------  IN: 120 mL / OUT: 0 mL / NET: 120 mL          PHYSICAL EXAM:  Appearance: Normal	  HEENT:   Atraumatic  Cardiovascular: Normal S1 S2, No JVD, No murmurs, No edema  Respiratory: Lungs clear to auscultation	  Gastrointestinal:  Soft, Non-tender, + BS	  Skin: No rashes, No ecchymoses, No cyanosis  Neurologic: Alert and awake, able to move extremities  Extremities: No edema    TELEMETRY: 	    SR PVCs    CURRENT MEDICATIONS:  lisinopril 5 milliGRAM(s) Oral daily  metoprolol tartrate 12.5 milliGRAM(s) Oral two times a day    azithromycin   Tablet 250 milliGRAM(s) Oral daily  nystatin    Suspension 269549 Unit(s) Oral four times a day    ALBUTerol    0.083% 2.5 milliGRAM(s) Nebulizer every 4 hours PRN  ALBUTerol/ipratropium for Nebulization 3 milliLiter(s) Nebulizer every 6 hours    acetaminophen   Tablet .. 650 milliGRAM(s) Oral every 6 hours PRN  ALPRAZolam 1 milliGRAM(s) Oral two times a day    pantoprazole    Tablet 40 milliGRAM(s) Oral before breakfast    atorvastatin 40 milliGRAM(s) Oral at bedtime  dextrose 40% Gel 15 Gram(s) Oral once PRN  dextrose 50% Injectable 12.5 Gram(s) IV Push once  dextrose 50% Injectable 25 Gram(s) IV Push once  dextrose 50% Injectable 25 Gram(s) IV Push once  glucagon  Injectable 1 milliGRAM(s) IntraMuscular once PRN  insulin glargine Injectable (LANTUS) 40 Unit(s) SubCutaneous every morning  insulin lispro (HumaLOG) corrective regimen sliding scale   SubCutaneous Before meals and at bedtime  predniSONE   Tablet 60 milliGRAM(s) Oral daily    aspirin enteric coated 81 milliGRAM(s) Oral daily  clopidogrel Tablet 75 milliGRAM(s) Oral daily  dextrose 5%. 1000 milliLiter(s) IV Continuous <Continuous>  enoxaparin Injectable 40 milliGRAM(s) SubCutaneous daily      LABS:	 	                            14.1   8.5   )-----------( 137      ( 07 Oct 2018 08:32 )             47.8     10-07    139  |  93<L>  |  29.0<H>  ----------------------------<  309<H>  5.2   |  41.0<H>  |  0.51    Ca    9.0      07 Oct 2018 08:32  Mg     2.3     10-07      proBNP: Serum Pro-Brain Natriuretic Peptide: 1258 pg/mL (10-04 @ 06:42)  Serum Pro-Brain Natriuretic Peptide: 1242 pg/mL (10-03 @ 19:44)    Lipid Profile: Date: 10-04 @ 06:42  Total cholesterol 136; Direct LDL: 85; HDL: 27; Triglycerides:122    HgA1c:   TSH: Thyroid Stimulating Hormone, Serum: 0.40 uIU/mL (10-04 @ 06:42)

## 2018-10-08 NOTE — PROGRESS NOTE ADULT - SUBJECTIVE AND OBJECTIVE BOX
PULMONARY PROGRESS NOTE      DEVIKA CARBALLOANKIT-[id number]    Patient is a 58y old  Male who presents with a chief complaint of shortness of breath (08 Oct 2018 08:59)      INTERVAL HPI/OVERNIGHT EVENTS:Comfortable on HFNC, denies SOB    MEDICATIONS  (STANDING):  ALBUTerol/ipratropium for Nebulization 3 milliLiter(s) Nebulizer every 6 hours  ALPRAZolam 1 milliGRAM(s) Oral two times a day  aspirin enteric coated 81 milliGRAM(s) Oral daily  atorvastatin 40 milliGRAM(s) Oral at bedtime  azithromycin   Tablet 250 milliGRAM(s) Oral daily  clopidogrel Tablet 75 milliGRAM(s) Oral daily  dextrose 5%. 1000 milliLiter(s) (50 mL/Hr) IV Continuous <Continuous>  dextrose 50% Injectable 12.5 Gram(s) IV Push once  dextrose 50% Injectable 25 Gram(s) IV Push once  dextrose 50% Injectable 25 Gram(s) IV Push once  enoxaparin Injectable 40 milliGRAM(s) SubCutaneous daily  insulin glargine Injectable (LANTUS) 40 Unit(s) SubCutaneous every morning  insulin lispro (HumaLOG) corrective regimen sliding scale   SubCutaneous Before meals and at bedtime  lisinopril 5 milliGRAM(s) Oral daily  metoprolol tartrate 12.5 milliGRAM(s) Oral two times a day  nicotine - 21 mG/24Hr(s) Patch 1 patch Transdermal daily  nystatin    Suspension 815736 Unit(s) Oral four times a day  pantoprazole    Tablet 40 milliGRAM(s) Oral before breakfast  predniSONE   Tablet 60 milliGRAM(s) Oral daily  spironolactone 12.5 milliGRAM(s) Oral daily      MEDICATIONS  (PRN):  acetaminophen   Tablet .. 650 milliGRAM(s) Oral every 6 hours PRN Temp greater or equal to 38C (100.4F), Moderate Pain (4 - 6)  ALBUTerol    0.083% 2.5 milliGRAM(s) Nebulizer every 4 hours PRN Shortness of Breath and/or Wheezing  dextrose 40% Gel 15 Gram(s) Oral once PRN Blood Glucose LESS THAN 70 milliGRAM(s)/deciliter  glucagon  Injectable 1 milliGRAM(s) IntraMuscular once PRN Glucose LESS THAN 70 milligrams/deciliter      Allergies    No Known Allergies    Intolerances        PAST MEDICAL & SURGICAL HISTORY:  Oxygen dependent  Gastroesophageal reflux disease, esophagitis presence not specified  Smoker  Bipolar 1 disorder  Coronary artery disease involving native coronary artery of native heart without angina pectoris  Type 2 diabetes mellitus without complication, with long-term current use of insulin  Pulmonary HTN  HLD (hyperlipidemia)  Stented coronary artery  COPD (chronic obstructive pulmonary disease)  No significant past surgical history      SOCIAL HISTORY  Smoking History:       REVIEW OF SYSTEMS:    CONSTITUTIONAL:  No distress    HEENT:  Eyes:  No diplopia or blurred vision. ENT:  No earache, sore throat or runny nose.    CARDIOVASCULAR:  No pressure, squeezing, tightness, heaviness or aching about the chest; no palpitations.    RESPIRATORY: Mild SOBOE    GASTROINTESTINAL:  No nausea, vomiting or diarrhea.    GENITOURINARY:  No dysuria, frequency or urgency.    MUSCULOSKELETAL:  No joint pain    SKIN:  No new lesions.    NEUROLOGIC:  No paresthesias, fasciculations, seizures or weakness.    PSYCHIATRIC:  No disorder of thought or mood.    ENDOCRINE:  No heat or cold intolerance, polyuria or polydipsia.    HEMATOLOGICAL:  No easy bruising or bleeding.     Vital Signs Last 24 Hrs  T(C): 36.4 (08 Oct 2018 04:45), Max: 37 (07 Oct 2018 21:20)  T(F): 97.5 (08 Oct 2018 04:45), Max: 98.6 (07 Oct 2018 21:20)  HR: 74 (08 Oct 2018 09:41) (71 - 78)  BP: 109/644 (08 Oct 2018 04:45) (109/644 - 114/76)  BP(mean): --  RR: 18 (07 Oct 2018 21:20) (18 - 18)  SpO2: 93% (08 Oct 2018 09:41) (93% - 98%)    PHYSICAL EXAMINATION:    GENERAL: The patient is awake and alert in no apparent distress.     HEENT: Head is normocephalic and atraumatic. Extraocular muscles are intact. Mucous membranes are moist.    NECK: Supple.    LUNGS:diminished bs bilat    HEART: Regular rate and rhythm without murmur.    ABDOMEN: Soft, nontender, and nondistended.      EXTREMITIES: Without any cyanosis, clubbing, rash, lesions or edema.    NEUROLOGIC: Grossly intact.    SKIN: No ulceration or induration present.      LABS:                        14.1   8.5   )-----------( 137      ( 07 Oct 2018 08:32 )             47.8     10-08    136  |  90<L>  |  25.0<H>  ----------------------------<  351<H>  5.1   |  42.0<H>  |  0.54    Ca    8.9      08 Oct 2018 09:27  Mg     2.1     10-08                          MICROBIOLOGY:    RADIOLOGY & ADDITIONAL STUDIES:< from: CT Angio Chest w/ IV Cont (10.05.18 @ 18:51) >  IMPRESSION:    No evidence of pulmonary embolism.  LEFT lower lobe airspace consolidation concerning for infectious   pneumonia.      < end of copied text >

## 2018-10-08 NOTE — PROGRESS NOTE ADULT - SUBJECTIVE AND OBJECTIVE BOX
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 Pt states he lives in a rented room in a home where everyone is sick with a cold. Pt reports waking up today to smoke after drinking nyquil for his congestion and dry cough and approx one hour later states he could not breathe despite using his oxygen. Pt denies any cp, palpitations, n/v/d, HA, fever, chills, lethargy, gu sx. Pt uses [facility] pharmacy which is closed, states he is on a blood thinner that is not coumadin but does not know. Additional meds reconciled however pt does not know doses. (03 Oct 2018 22:29)    INTERVAL HPI/OVERNIGHT EVENTS:  Doing well. No active complaints.   Denies chest pain, palpitations, shortness of breath, headache, dizziness, nausea, vomiting or abdominal pain.   No overnight issues. Still on High Flow.     Review of systems:   As per interval history otherwise unremarkable.    PHYSICAL EXAM:  Vital Signs   T(C): 36.8 (08 Oct 2018 12:21), Max: 37 (07 Oct 2018 21:20)  T(F): 98.3 (08 Oct 2018 12:21), Max: 98.6 (07 Oct 2018 21:20)  HR: 73 (08 Oct 2018 12:21) (71 - 78)  BP: 105/68 (08 Oct 2018 12:21) (105/68 - 114/76)  RR: 18 (08 Oct 2018 12:21) (18 - 18)  SpO2: 93% (08 Oct 2018 09:41) (93% - 98%)  General: Well developed. Well nourished. No acute distress  HEENT: PERRLA. EOMI. Clear conjunctivae. Moist mucus membrane. High Flow NC in place.   Neck: Supple. No JVD.   Chest: Decreased air entry at bilaterally. No wheezing, rales or rhonchi. No chest wall tenderness.  Heart: Normal S1 & S2 with RRR. No murmur.   Abdomen: Soft. Non-tender. Non-distended. + BS  Ext: No pedal edema. No calf tenderness   Neuro: AAO x 3. No focal deficit. No speech disorder.  Skin: Warm and Dry  Psychiatry: Normal mood and affect    LABS:  CAPILLARY BLOOD GLUCOSE  POCT Blood Glucose.: 355 mg/dL (08 Oct 2018 11:39)  POCT Blood Glucose.: 301 mg/dL (08 Oct 2018 08:32)  POCT Blood Glucose.: 210 mg/dL (07 Oct 2018 22:20)  POCT Blood Glucose.: 348 mg/dL (07 Oct 2018 16:56)                          14.1   8.5   )-----------( 137      ( 07 Oct 2018 08:32 )             47.8     10-08    136  |  90<L>  |  25.0<H>  ----------------------------<  351<H>  5.1   |  42.0<H>  |  0.54    Ca    8.9      08 Oct 2018 09:27  Mg     2.1     10-08    Hemoglobin A1C, Whole Blood: 10.8 % (10-04-18 @ 06:42)    Rapid Respiratory Viral Panel (10.04.18 @ 01:38)  Rapid RVP Result: Detected: The [employer] RVP Rapid uses polymerase chain reaction (PCR) and melt curve analysis to screen for adenovirus; coronavirus HKU1, NL63, 229E, OC43; human metapneumovirus (hMPV); human enterovirus/rhinovirus (Entero/RV); influenza A; influenza A/H1;influenza A/H3; influenza  A/H1-2009; influenza B; parainfluenza viruses 1, 2, 3, 4; respiratory syncytial virus; Bordetella pertussis; Mycoplasma pneumoniae; and Chlamydophila pneumoniae.  Entero/Rhinovirus (RapRVP): Detected      RADIOLOGY & ADDITIONAL TESTS:  Xray Chest 2 Views PA/Lat (10.03.18 @ 17:46)  FINDINGS:       The lungs are hyperaerated with probable emphysematous changes. There is no acute parenchymal consolidation.  There is no pleural effusion. The cardiomediastinal silhouette is within normal limits.  There is thoracic spondylosis.    IMPRESSION:  Clear lungs.    CT Angio Chest w/ IV Cont (10.05.18 @ 18:51)  No evidence of pulmonary embolism.  LEFT lower lobe airspace consolidation concerning for infectious pneumonia.

## 2018-10-09 DIAGNOSIS — F31.9 BIPOLAR DISORDER, UNSPECIFIED: ICD-10-CM

## 2018-10-09 DIAGNOSIS — J44.9 CHRONIC OBSTRUCTIVE PULMONARY DISEASE, UNSPECIFIED: ICD-10-CM

## 2018-10-09 LAB
ANION GAP SERPL CALC-SCNC: 7 MMOL/L — SIGNIFICANT CHANGE UP (ref 5–17)
BASOPHILS # BLD AUTO: 0 K/UL — SIGNIFICANT CHANGE UP (ref 0–0.2)
BASOPHILS NFR BLD AUTO: 0.1 % — SIGNIFICANT CHANGE UP (ref 0–2)
BUN SERPL-MCNC: 20 MG/DL — SIGNIFICANT CHANGE UP (ref 8–20)
CALCIUM SERPL-MCNC: 9.2 MG/DL — SIGNIFICANT CHANGE UP (ref 8.6–10.2)
CHLORIDE SERPL-SCNC: 92 MMOL/L — LOW (ref 98–107)
CO2 SERPL-SCNC: 40 MMOL/L — HIGH (ref 22–29)
CREAT SERPL-MCNC: 0.45 MG/DL — LOW (ref 0.5–1.3)
EOSINOPHIL # BLD AUTO: 0.1 K/UL — SIGNIFICANT CHANGE UP (ref 0–0.5)
EOSINOPHIL NFR BLD AUTO: 0.6 % — SIGNIFICANT CHANGE UP (ref 0–6)
GLUCOSE BLDC GLUCOMTR-MCNC: 147 MG/DL — HIGH (ref 70–99)
GLUCOSE BLDC GLUCOMTR-MCNC: 220 MG/DL — HIGH (ref 70–99)
GLUCOSE BLDC GLUCOMTR-MCNC: 297 MG/DL — HIGH (ref 70–99)
GLUCOSE BLDC GLUCOMTR-MCNC: 313 MG/DL — HIGH (ref 70–99)
GLUCOSE SERPL-MCNC: 144 MG/DL — HIGH (ref 70–115)
HCT VFR BLD CALC: 48.3 % — SIGNIFICANT CHANGE UP (ref 42–52)
HGB BLD-MCNC: 15.4 G/DL — SIGNIFICANT CHANGE UP (ref 14–18)
LYMPHOCYTES # BLD AUTO: 1.8 K/UL — SIGNIFICANT CHANGE UP (ref 1–4.8)
LYMPHOCYTES # BLD AUTO: 16.7 % — LOW (ref 20–55)
MAGNESIUM SERPL-MCNC: 2.1 MG/DL — SIGNIFICANT CHANGE UP (ref 1.6–2.6)
MCHC RBC-ENTMCNC: 29.2 PG — SIGNIFICANT CHANGE UP (ref 27–31)
MCHC RBC-ENTMCNC: 31.9 G/DL — LOW (ref 32–36)
MCV RBC AUTO: 91.5 FL — SIGNIFICANT CHANGE UP (ref 80–94)
MONOCYTES # BLD AUTO: 0.8 K/UL — SIGNIFICANT CHANGE UP (ref 0–0.8)
MONOCYTES NFR BLD AUTO: 7.7 % — SIGNIFICANT CHANGE UP (ref 3–10)
NEUTROPHILS # BLD AUTO: 8 K/UL — SIGNIFICANT CHANGE UP (ref 1.8–8)
NEUTROPHILS NFR BLD AUTO: 74.7 % — HIGH (ref 37–73)
PLATELET # BLD AUTO: 163 K/UL — SIGNIFICANT CHANGE UP (ref 150–400)
POTASSIUM SERPL-MCNC: 4.5 MMOL/L — SIGNIFICANT CHANGE UP (ref 3.5–5.3)
POTASSIUM SERPL-SCNC: 4.5 MMOL/L — SIGNIFICANT CHANGE UP (ref 3.5–5.3)
RBC # BLD: 5.28 M/UL — SIGNIFICANT CHANGE UP (ref 4.6–6.2)
RBC # FLD: 14.6 % — SIGNIFICANT CHANGE UP (ref 11–15.6)
SODIUM SERPL-SCNC: 139 MMOL/L — SIGNIFICANT CHANGE UP (ref 135–145)
WBC # BLD: 10.6 K/UL — SIGNIFICANT CHANGE UP (ref 4.8–10.8)
WBC # FLD AUTO: 10.6 K/UL — SIGNIFICANT CHANGE UP (ref 4.8–10.8)

## 2018-10-09 PROCEDURE — 99232 SBSQ HOSP IP/OBS MODERATE 35: CPT

## 2018-10-09 RX ADMIN — Medication 1 MILLIGRAM(S): at 06:08

## 2018-10-09 RX ADMIN — CLOPIDOGREL BISULFATE 75 MILLIGRAM(S): 75 TABLET, FILM COATED ORAL at 12:10

## 2018-10-09 RX ADMIN — Medication 3 MILLILITER(S): at 09:19

## 2018-10-09 RX ADMIN — Medication 60 MILLIGRAM(S): at 06:17

## 2018-10-09 RX ADMIN — Medication 3 UNIT(S): at 17:18

## 2018-10-09 RX ADMIN — Medication 650 MILLIGRAM(S): at 09:45

## 2018-10-09 RX ADMIN — INSULIN GLARGINE 45 UNIT(S): 100 INJECTION, SOLUTION SUBCUTANEOUS at 08:16

## 2018-10-09 RX ADMIN — Medication 250 MILLIGRAM(S): at 17:17

## 2018-10-09 RX ADMIN — Medication 500000 UNIT(S): at 17:17

## 2018-10-09 RX ADMIN — Medication 3 UNIT(S): at 12:11

## 2018-10-09 RX ADMIN — Medication 12.5 MILLIGRAM(S): at 17:16

## 2018-10-09 RX ADMIN — LISINOPRIL 5 MILLIGRAM(S): 2.5 TABLET ORAL at 06:08

## 2018-10-09 RX ADMIN — Medication 250 MILLIGRAM(S): at 06:17

## 2018-10-09 RX ADMIN — Medication 650 MILLIGRAM(S): at 10:15

## 2018-10-09 RX ADMIN — Medication 500000 UNIT(S): at 06:08

## 2018-10-09 RX ADMIN — Medication 3 MILLILITER(S): at 15:35

## 2018-10-09 RX ADMIN — Medication 650 MILLIGRAM(S): at 22:52

## 2018-10-09 RX ADMIN — SPIRONOLACTONE 12.5 MILLIGRAM(S): 25 TABLET, FILM COATED ORAL at 06:09

## 2018-10-09 RX ADMIN — Medication 12.5 MILLIGRAM(S): at 06:08

## 2018-10-09 RX ADMIN — Medication 4: at 21:51

## 2018-10-09 RX ADMIN — Medication 6: at 12:10

## 2018-10-09 RX ADMIN — ATORVASTATIN CALCIUM 40 MILLIGRAM(S): 80 TABLET, FILM COATED ORAL at 21:52

## 2018-10-09 RX ADMIN — Medication 500000 UNIT(S): at 12:10

## 2018-10-09 RX ADMIN — Medication 81 MILLIGRAM(S): at 12:10

## 2018-10-09 RX ADMIN — Medication 1 PATCH: at 12:40

## 2018-10-09 RX ADMIN — Medication 650 MILLIGRAM(S): at 03:01

## 2018-10-09 RX ADMIN — Medication 3 UNIT(S): at 08:16

## 2018-10-09 RX ADMIN — Medication 650 MILLIGRAM(S): at 21:52

## 2018-10-09 RX ADMIN — Medication 1 MILLIGRAM(S): at 17:17

## 2018-10-09 RX ADMIN — ENOXAPARIN SODIUM 40 MILLIGRAM(S): 100 INJECTION SUBCUTANEOUS at 12:11

## 2018-10-09 RX ADMIN — Medication 8: at 17:17

## 2018-10-09 RX ADMIN — AZITHROMYCIN 250 MILLIGRAM(S): 500 TABLET, FILM COATED ORAL at 12:10

## 2018-10-09 RX ADMIN — PANTOPRAZOLE SODIUM 40 MILLIGRAM(S): 20 TABLET, DELAYED RELEASE ORAL at 06:08

## 2018-10-09 RX ADMIN — Medication 3 MILLILITER(S): at 03:50

## 2018-10-09 RX ADMIN — Medication 1 PATCH: at 12:10

## 2018-10-09 RX ADMIN — Medication 3 MILLILITER(S): at 20:18

## 2018-10-09 NOTE — DIETITIAN INITIAL EVALUATION ADULT. - PERTINENT LABORATORY DATA
10-09 Na139 mmol/L Glu 144 mg/dL<H> K+ 4.5 mmol/L Cr  0.45 mg/dL<L> BUN 20.0 mg/dL Phos n/a   Alb n/a   PAB n/a

## 2018-10-09 NOTE — PROGRESS NOTE ADULT - SUBJECTIVE AND OBJECTIVE BOX
CARDIOLOGY PROGRESS NOTE   ([facility])                                                                                                        Subjective: remained on HiFlo O2, didn;t tolerate nasal canula periods of desat, denies CP, "Wants to go home"    Vitals:  T(C): 36.8 (10-09-18 @ 16:39), Max: 37.2 (10-09-18 @ 11:30)  HR: 86 (10-09-18 @ 16:39) (70 - 90)  BP: 108/75 (10-09-18 @ 16:39) (102/66 - 116/79)  RR: 18 (10-09-18 @ 16:39) (18 - 18)  SpO2: 97% (10-09-18 @ 15:42) (91% - 97%)  Wt(kg): --  I&O's Summary    08 Oct 2018 07:01  -  09 Oct 2018 07:00  --------------------------------------------------------  IN: 880 mL / OUT: 0 mL / NET: 880 mL    09 Oct 2018 07:01  -  09 Oct 2018 18:34  --------------------------------------------------------  IN: 960 mL / OUT: 0 mL / NET: 960 mL          PHYSICAL EXAM:  Appearance: Normal	  HEENT:   Atraumatic  Cardiovascular: Normal S1 S2, No JVD, No murmurs, No edema  Respiratory: Lungs clear to auscultation	  Gastrointestinal:  Soft, Non-tender, + BS	  Skin: No rashes, No ecchymoses, No cyanosis  Neurologic: Alert and awake, able to move extremities  Extremities: No edema    TELEMETRY: 	 SR, periods of desaturation       CURRENT MEDICATIONS:  lisinopril 5 milliGRAM(s) Oral daily  metoprolol tartrate 12.5 milliGRAM(s) Oral two times a day  spironolactone 12.5 milliGRAM(s) Oral daily    nystatin    Suspension 975661 Unit(s) Oral four times a day    ALBUTerol    0.083% 2.5 milliGRAM(s) Nebulizer every 4 hours PRN  ALBUTerol/ipratropium for Nebulization 3 milliLiter(s) Nebulizer every 6 hours    acetaminophen   Tablet .. 650 milliGRAM(s) Oral every 6 hours PRN  ALPRAZolam 1 milliGRAM(s) Oral two times a day    pantoprazole    Tablet 40 milliGRAM(s) Oral before breakfast    atorvastatin 40 milliGRAM(s) Oral at bedtime  dextrose 40% Gel 15 Gram(s) Oral once PRN  dextrose 50% Injectable 12.5 Gram(s) IV Push once  dextrose 50% Injectable 25 Gram(s) IV Push once  dextrose 50% Injectable 25 Gram(s) IV Push once  glucagon  Injectable 1 milliGRAM(s) IntraMuscular once PRN  insulin glargine Injectable (LANTUS) 45 Unit(s) SubCutaneous every morning  insulin lispro (HumaLOG) corrective regimen sliding scale   SubCutaneous Before meals and at bedtime  insulin lispro Injectable (HumaLOG) 3 Unit(s) SubCutaneous three times a day before meals  predniSONE   Tablet 60 milliGRAM(s) Oral daily    aspirin enteric coated 81 milliGRAM(s) Oral daily  clopidogrel Tablet 75 milliGRAM(s) Oral daily  dextrose 5%. 1000 milliLiter(s) IV Continuous <Continuous>  enoxaparin Injectable 40 milliGRAM(s) SubCutaneous daily      LABS:	 	                          15.4   10.6  )-----------( 163      ( 09 Oct 2018 08:48 )             48.3     10-09    139  |  92<L>  |  20.0  ----------------------------<  144<H>  4.5   |  40.0<H>  |  0.45<L>    Ca    9.2      09 Oct 2018 08:48  Mg     2.1     10-09        Lipid Profile: Date: 10-04 @ 06:42  Total cholesterol 136; Direct LDL: 85; HDL: 27; Triglycerides:122    HgA1c:   TSH: Thyroid Stimulating Hormone, Serum: 0.40 uIU/mL (10-04 @ 06:42)

## 2018-10-09 NOTE — PROGRESS NOTE ADULT - ASSESSMENT
Severe baseline COPD on 2 1/2 LPM at home  Patient of Dr. Celis  Appears better at this time but still on high flow oxygen  Would progress

## 2018-10-09 NOTE — PROGRESS NOTE ADULT - SUBJECTIVE AND OBJECTIVE BOX
PULMONARY PROGRESS NOTE      DEVIKA CARBALLOANKIT-[id number]    Patient is a 58y old  Male who presents with a chief complaint of shortness of breath (08 Oct 2018 15:20)      INTERVAL HPI/OVERNIGHT EVENTS:    MEDICATIONS  (STANDING):  ALBUTerol/ipratropium for Nebulization 3 milliLiter(s) Nebulizer every 6 hours  ALPRAZolam 1 milliGRAM(s) Oral two times a day  aspirin enteric coated 81 milliGRAM(s) Oral daily  atorvastatin 40 milliGRAM(s) Oral at bedtime  azithromycin   Tablet 250 milliGRAM(s) Oral daily  clopidogrel Tablet 75 milliGRAM(s) Oral daily  dextrose 5%. 1000 milliLiter(s) (50 mL/Hr) IV Continuous <Continuous>  dextrose 50% Injectable 12.5 Gram(s) IV Push once  dextrose 50% Injectable 25 Gram(s) IV Push once  dextrose 50% Injectable 25 Gram(s) IV Push once  enoxaparin Injectable 40 milliGRAM(s) SubCutaneous daily  insulin glargine Injectable (LANTUS) 45 Unit(s) SubCutaneous every morning  insulin lispro (HumaLOG) corrective regimen sliding scale   SubCutaneous Before meals and at bedtime  insulin lispro Injectable (HumaLOG) 3 Unit(s) SubCutaneous three times a day before meals  lisinopril 5 milliGRAM(s) Oral daily  metoprolol tartrate 12.5 milliGRAM(s) Oral two times a day  nicotine - 21 mG/24Hr(s) Patch 1 patch Transdermal daily  nystatin    Suspension [id number] Unit(s) Oral four times a day  pantoprazole    Tablet 40 milliGRAM(s) Oral before breakfast  predniSONE   Tablet 60 milliGRAM(s) Oral daily  saccharomyces boulardii 250 milliGRAM(s) Oral two times a day  spironolactone 12.5 milliGRAM(s) Oral daily      MEDICATIONS  (PRN):  acetaminophen   Tablet .. 650 milliGRAM(s) Oral every 6 hours PRN Temp greater or equal to 38C (100.4F), Moderate Pain (4 - 6)  ALBUTerol    0.083% 2.5 milliGRAM(s) Nebulizer every 4 hours PRN Shortness of Breath and/or Wheezing  dextrose 40% Gel 15 Gram(s) Oral once PRN Blood Glucose LESS THAN 70 milliGRAM(s)/deciliter  glucagon  Injectable 1 milliGRAM(s) IntraMuscular once PRN Glucose LESS THAN 70 milligrams/deciliter      Allergies    No Known Allergies    Intolerances        PAST MEDICAL & SURGICAL HISTORY:  Oxygen dependent  Gastroesophageal reflux disease, esophagitis presence not specified  Smoker  Bipolar 1 disorder  Coronary artery disease involving native coronary artery of native heart without angina pectoris  Type 2 diabetes mellitus without complication, with long-term current use of insulin  Pulmonary HTN  HLD (hyperlipidemia)  Stented coronary artery  COPD (chronic obstructive pulmonary disease)  No significant past surgical history      SOCIAL HISTORY  Smoking History:       REVIEW OF SYSTEMS:    CONSTITUTIONAL:  No distress    HEENT:  Eyes:  No diplopia or blurred vision. ENT:  No earache, sore throat or runny nose.    CARDIOVASCULAR:  No pressure, squeezing, tightness, heaviness or aching about the chest; no palpitations.    RESPIRATORY:  No cough, shortness of breath, PND or orthopnea. Mild SOBOE    GASTROINTESTINAL:  No nausea, vomiting or diarrhea.    GENITOURINARY:  No dysuria, frequency or urgency.    MUSCULOSKELETAL:  No joint pain    SKIN:  No new lesions.    NEUROLOGIC:  No paresthesias, fasciculations, seizures or weakness.    PSYCHIATRIC:  No disorder of thought or mood.    ENDOCRINE:  No heat or cold intolerance, polyuria or polydipsia.    HEMATOLOGICAL:  No easy bruising or bleeding.     Vital Signs Last 24 Hrs  T(C): 36.9 (09 Oct 2018 04:44), Max: 36.9 (09 Oct 2018 04:44)  T(F): 98.4 (09 Oct 2018 04:44), Max: 98.4 (09 Oct 2018 04:44)  HR: 90 (09 Oct 2018 09:23) (71 - 90)  BP: 116/79 (08 Oct 2018 22:00) (102/66 - 116/79)  BP(mean): --  RR: 18 (08 Oct 2018 22:00) (18 - 18)  SpO2: 93% (09 Oct 2018 09:23) (91% - 96%)    PHYSICAL EXAMINATION:    GENERAL: The patient is awake and alert in no apparent distress.     HEENT: Head is normocephalic and atraumatic. Extraocular muscles are intact. Mucous membranes are moist.    NECK: Supple.    LUNGS: Clear to auscultation without wheezing, rales or rhonchi; respirations unlabored    HEART: Regular rate and rhythm without murmur.    ABDOMEN: Soft, nontender, and nondistended.      EXTREMITIES: Without any cyanosis, clubbing, rash, lesions or edema.    NEUROLOGIC: Grossly intact.    SKIN: No ulceration or induration present.      LABS:                        15.4   10.6  )-----------( 163      ( 09 Oct 2018 08:48 )             48.3     10-09    139  |  92<L>  |  20.0  ----------------------------<  144<H>  4.5   |  40.0<H>  |  0.45<L>    Ca    9.2      09 Oct 2018 08:48  Mg     2.1     10-09                          MICROBIOLOGY:    RADIOLOGY & ADDITIONAL STUDIES:< from: CT Angio Chest w/ IV Cont (10.05.18 @ 18:51) >    INTERPRETATION:  CTA chest .  COMPARISON: None.  CLINICAL INFORMATION: chest pain, dyspnea.  TECHNIQUE: Contiguous axial 1.25 mm slice thickness images ofthe chest   were obtained after intravenous contrast administration utilizing PE   protocol.  Maximum intensity projection,(MIP) 3-D,  imaging was created and   interpreted.  100 mls of Omnipaque 300 was administered intravenously without   complication and 0 mls were discarded.    FINDINGS:  There are no pulmonary arterial filling defects to suggest pulmonary   embolism.    The mediastinum great vessels are normal.     There are no mediastinal masses or lymphadenopathy.     There is mild cardiomegaly with coronary artery calcifications. The   airway is patent showing normal caliber and contour.    There is LEFT lower lobe peripheral segmental airspace consolidation with   mild peribronchial lung lower lobe thickening and mucous plugging. .   RIGHT lung parenchyma and LEFT upper lobe remain clear..    There is no pleural effusion or pneumothorax.    Visualized upper abdominal viscera unremarkable.    The bones are normal.    IMPRESSION:    No evidence of pulmonary embolism.  LEFT lower lobe airspace consolidation concerning for infectious   pneumonia.            < end of copied text >

## 2018-10-09 NOTE — DIETITIAN INITIAL EVALUATION ADULT. - PROBLEM SELECTOR PLAN 2
admit to telemetry  cardiology consult- [facility] to evaluate pt  strict i/o's  daily weights  TTE ordered  ACE started at low dose-confirm meds with pharmacy  Beta blocker- started at low dose-confirm with pharmacy  serial troponins  Pro-BnP in AM  cbc, cmp, coags, mg, phos  IV diuresis with close monitoring of renal function

## 2018-10-09 NOTE — PROGRESS NOTE ADULT - ASSESSMENT
> Acute on chronic hypercapnic and hypoxic respiratory failure - multifactorial (COPD Exacerbation, Pulmonary HTN, Viral Pneumonia and Acute Diastolic Heart Failure)   - RVP is positive for Rhinovirus.  Continue Steroid taper, nebulizers.  Smoking cessation emphasized.  > Smoker - Nicotine Patch.  I strongly encouraged the patient to quit smoking.  I outlined the extensive negative impact of smoking on quality of life and the numerous consequences of continued smoking including lung disease, heart attack, stroke, vascular disease, malignancy, increased mortality, etc.  The patient understood these effects and agreed on smoking cessation.  > Acute Diastolic CHF - Continue Lisinopril, Lopressor, Aldactone.      > CAD and HLD - Continue Aspirin, Plavix, Lipitor and Metoprolol  > Diabetes Type II on longterm Insulin Therapy - monitor fingersticks.  Insulin coverage for hyperglycemia. Lantus, premeal insulin.  Monitor FS.  If remains elevated, will increase Lantus.   DVT Prophylaxis -- Lovenox 40 mg > Acute on chronic hypercapnic and hypoxic respiratory failure - multifactorial (COPD Exacerbation, Pulmonary HTN, Viral Pneumonia and Acute Diastolic Heart Failure)   - RVP is positive for Rhinovirus.  Continue Steroid taper, nebulizers.  Attempt to wean from high flow O2.  Discussed with pulmonary.  Smoking cessation emphasized.  Pt was able to demonstrate a clear understanding of his need for continued hospitalization, requirement for O2.  He agrees for now to remain hospitalized.  	  > Smoker - Nicotine Patch.  I strongly encouraged the patient to quit smoking.  I outlined the extensive negative impact of smoking on quality of life and the numerous consequences of continued smoking including lung disease, heart attack, stroke, vascular disease, malignancy, increased mortality, etc.  The patient understood these effects and agreed on smoking cessation.  > Acute Diastolic CHF - Continue Lisinopril, Lopressor, Aldactone.      > CAD and HLD - Continue Aspirin, Plavix, Lipitor and Metoprolol  > Diabetes Type II on longterm Insulin Therapy - monitor fingersticks.  Insulin coverage for hyperglycemia. Lantus, premeal insulin.  Monitor FS.  If remains elevated, will increase Lantus.   DVT Prophylaxis -- Lovenox 40 mg

## 2018-10-09 NOTE — DIETITIAN INITIAL EVALUATION ADULT. - NS AS NUTRI INTERV ED CONTENT
Heart failure diet = decrease fluids, monitor weights, low salt diet/Nutrition relationship to health/disease

## 2018-10-09 NOTE — CHART NOTE - NSCHARTNOTEFT_GEN_A_CORE
Diabetes Follow up: Spoke with pt again today about diabetes self management.  Pt states taking Glipizide bid, long acting insulin in the evening and short acting insulin scale before meals via insulin pen.  Pt does not always check blood sugar before giving insulin and often goes based on how he feels.  Advised pt the importance of checking BG 4x daily to treat hyperglycemia appropriately and prevent hypoglycemia.  Discussed cons cho meal plan using the plate method and importance of consuming 3 meals daily.  Reviewed s/s and treatment for hypoglycemia.  Pt was able to inject insulin correctly with RN supervision as per RN.  Pt will also demonstrate correct use of glucometer with RN supervision.  Encouraged pt to continue following up with his PMD for further diabetes management.  Pt agreeable to information reviewed, however expected compliance seems inconsistent.  Nutrition literature provided.  RD CDE to remain available.    Recommendations:  Consider increasing pre-meal insulin if BG remain elevated  Pt will inject all insulin doses while in the hospital using pre filled insulin syringe and RN supervision  Pt will demonstrate correct technique for finger sticks with RN supervision  Continue with diabetes self management education

## 2018-10-09 NOTE — PROGRESS NOTE ADULT - SUBJECTIVE AND OBJECTIVE BOX
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 Found to have a positive rhinovirus on viral panel.  Admitted for COPD exacerbation / viral pneumonia.      Hospital Course:  Responded to IV steroids, nebulizers.  Remains on high flow O2.  Pt requested to leave AMA in morning, states "I can't stay here".  No cough.  Reports SOB is improving.  No additional complaints.    ____________________PHYSICAL EXAM:  Vitals reviewed as indicated below  GENERAL:  NAD Alert and Oriented x 3   HEENT: NCAT  CARDIOVASCULAR:  S1, S2  LUNGS: coarse BS b/l   ABDOMEN:  soft, (-) tenderness, (-) distension, (+) bowel sounds, (-) guarding, (-) rebound (-) rigidity  EXTREMITIES:  s/p L BKA.  R foot areas of necrosis.   ____________________      BACKGROUND:  HEALTH ISSUES - PROBLEM Dx:  Bipolar 1 disorder: Bipolar 1 disorder  COPD (chronic obstructive pulmonary disease): COPD (chronic obstructive pulmonary disease)  Coronary artery disease involving native coronary artery of native heart, angina presence unspecified: Coronary artery disease involving native coronary artery of native heart, angina presence unspecified  Acute systolic CHF (congestive heart failure), NYHA class 2: Acute systolic CHF (congestive heart failure), NYHA class 2  Hyperlipidemia, unspecified hyperlipidemia type: Hyperlipidemia, unspecified hyperlipidemia type  Type 2 diabetes mellitus without complication, with long-term current use of insulin: Type 2 diabetes mellitus without complication, with long-term current use of insulin  Coronary artery disease involving native coronary artery of native heart without angina pectoris: Coronary artery disease involving native coronary artery of native heart without angina pectoris  Gastroesophageal reflux disease, esophagitis presence not specified: Gastroesophageal reflux disease, esophagitis presence not specified  Smoker: Smoker  Acute congestive heart failure, unspecified heart failure type: Acute congestive heart failure, unspecified heart failure type  Acute on chronic respiratory failure with hypoxia and hypercapnia: Acute on chronic respiratory failure with hypoxia and hypercapnia        Allergies    No Known Allergies    Intolerances      PAST MEDICAL & SURGICAL HISTORY:  Oxygen dependent  Gastroesophageal reflux disease, esophagitis presence not specified  Smoker  Bipolar 1 disorder  Coronary artery disease involving native coronary artery of native heart without angina pectoris  Type 2 diabetes mellitus without complication, with long-term current use of insulin  Pulmonary HTN  HLD (hyperlipidemia)  Stented coronary artery  COPD (chronic obstructive pulmonary disease)  No significant past surgical history        VITALS:  Vital Signs Last 24 Hrs  T(C): 37.2 (09 Oct 2018 11:30), Max: 37.2 (09 Oct 2018 11:30)  T(F): 99 (09 Oct 2018 11:30), Max: 99 (09 Oct 2018 11:30)  HR: 70 (09 Oct 2018 11:30) (70 - 90)  BP: 102/66 (09 Oct 2018 11:30) (102/66 - 116/79)  BP(mean): --  RR: 18 (09 Oct 2018 11:30) (18 - 18)  SpO2: 93% (09 Oct 2018 09:23) (91% - 96%) Daily     Daily   CAPILLARY BLOOD GLUCOSE      POCT Blood Glucose.: 297 mg/dL (09 Oct 2018 11:38)  POCT Blood Glucose.: 147 mg/dL (09 Oct 2018 07:46)  POCT Blood Glucose.: 198 mg/dL (08 Oct 2018 22:53)  POCT Blood Glucose.: 194 mg/dL (08 Oct 2018 21:35)  POCT Blood Glucose.: 304 mg/dL (08 Oct 2018 16:10)    I&O's Summary    08 Oct 2018 07:01  -  09 Oct 2018 07:00  --------------------------------------------------------  IN: 880 mL / OUT: 0 mL / NET: 880 mL          LABS:                        15.4   10.6  )-----------( 163      ( 09 Oct 2018 08:48 )             48.3     10-09    139  |  92<L>  |  20.0  ----------------------------<  144<H>  4.5   |  40.0<H>  |  0.45<L>    Ca    9.2      09 Oct 2018 08:48  Mg     2.1     10-09                    MEDICATIONS:  MEDICATIONS  (STANDING):  ALBUTerol/ipratropium for Nebulization 3 milliLiter(s) Nebulizer every 6 hours  ALPRAZolam 1 milliGRAM(s) Oral two times a day  aspirin enteric coated 81 milliGRAM(s) Oral daily  atorvastatin 40 milliGRAM(s) Oral at bedtime  clopidogrel Tablet 75 milliGRAM(s) Oral daily  dextrose 5%. 1000 milliLiter(s) (50 mL/Hr) IV Continuous <Continuous>  dextrose 50% Injectable 12.5 Gram(s) IV Push once  dextrose 50% Injectable 25 Gram(s) IV Push once  dextrose 50% Injectable 25 Gram(s) IV Push once  enoxaparin Injectable 40 milliGRAM(s) SubCutaneous daily  insulin glargine Injectable (LANTUS) 45 Unit(s) SubCutaneous every morning  insulin lispro (HumaLOG) corrective regimen sliding scale   SubCutaneous Before meals and at bedtime  insulin lispro Injectable (HumaLOG) 3 Unit(s) SubCutaneous three times a day before meals  lisinopril 5 milliGRAM(s) Oral daily  metoprolol tartrate 12.5 milliGRAM(s) Oral two times a day  nicotine - 21 mG/24Hr(s) Patch 1 patch Transdermal daily  nystatin    Suspension [id number] Unit(s) Oral four times a day  pantoprazole    Tablet 40 milliGRAM(s) Oral before breakfast  predniSONE   Tablet 60 milliGRAM(s) Oral daily  saccharomyces boulardii 250 milliGRAM(s) Oral two times a day  spironolactone 12.5 milliGRAM(s) Oral daily    MEDICATIONS  (PRN):  acetaminophen   Tablet .. 650 milliGRAM(s) Oral every 6 hours PRN Temp greater or equal to 38C (100.4F), Moderate Pain (4 - 6)  ALBUTerol    0.083% 2.5 milliGRAM(s) Nebulizer every 4 hours PRN Shortness of Breath and/or Wheezing  dextrose 40% Gel 15 Gram(s) Oral once PRN Blood Glucose LESS THAN 70 milliGRAM(s)/deciliter  glucagon  Injectable 1 milliGRAM(s) IntraMuscular once PRN Glucose LESS THAN 70 milligrams/deciliter

## 2018-10-09 NOTE — PROGRESS NOTE ADULT - ASSESSMENT
59 yo M with pulmonary HTN, GERD, Bipolar disorder, HTN, HLD, daily tobacco smoker T2DM on insulin,   CAD s/p PCI in 2016 (RCA?) by Dr. Patrice Belle in [place] (I called office not open)  severe COPD/emphysema on home oxygen 2L admitted with acute respiratory failure, hypoxia and acute diastolic  HF    A/  Acute Diastolic Heart  failure  Echo Prelim: Hyperdynamic LV EF >75. RV severely enlarged. RV function reduced. RA severely enlarged Trace MR. Mod TR. PASP 56mmHg.  COPD; underlying stage 4 dz  pHTN   Acute on chronic hypercapnic and hypoxic respiratory failure  Rhinovirus infection  Smoker        P/  Still on HFO2. Couldn't tolerate Nasal canula O2 with desaturation.   BIPAP  Continue Steroid taper, nebulizers.  spironolactone added, tolerated  BP soft will not advance meds further.      Continue:   lisinopril 5 milliGRAM(s) Oral daily  metoprolol tartrate 12.5 milliGRAM(s) Oral two times a day  spironolactone 12.5 milliGRAM(s) Oral daily  atorvastatin 40 milliGRAM(s) Oral at bedtime  aspirin enteric coated 81 milliGRAM(s) Oral daily  clopidogrel Tablet 75 milliGRAM(s) Oral daily

## 2018-10-10 LAB
ANION GAP SERPL CALC-SCNC: 9 MMOL/L — SIGNIFICANT CHANGE UP (ref 5–17)
BUN SERPL-MCNC: 20 MG/DL — SIGNIFICANT CHANGE UP (ref 8–20)
CALCIUM SERPL-MCNC: 8.5 MG/DL — LOW (ref 8.6–10.2)
CHLORIDE SERPL-SCNC: 91 MMOL/L — LOW (ref 98–107)
CO2 SERPL-SCNC: 37 MMOL/L — HIGH (ref 22–29)
CREAT SERPL-MCNC: 0.5 MG/DL — SIGNIFICANT CHANGE UP (ref 0.5–1.3)
GLUCOSE BLDC GLUCOMTR-MCNC: 244 MG/DL — HIGH (ref 70–99)
GLUCOSE BLDC GLUCOMTR-MCNC: 247 MG/DL — HIGH (ref 70–99)
GLUCOSE BLDC GLUCOMTR-MCNC: 317 MG/DL — HIGH (ref 70–99)
GLUCOSE BLDC GLUCOMTR-MCNC: 441 MG/DL — HIGH (ref 70–99)
GLUCOSE SERPL-MCNC: 296 MG/DL — HIGH (ref 70–115)
HCT VFR BLD CALC: 46.5 % — SIGNIFICANT CHANGE UP (ref 42–52)
HGB BLD-MCNC: 14.6 G/DL — SIGNIFICANT CHANGE UP (ref 14–18)
MCHC RBC-ENTMCNC: 28.6 PG — SIGNIFICANT CHANGE UP (ref 27–31)
MCHC RBC-ENTMCNC: 31.4 G/DL — LOW (ref 32–36)
MCV RBC AUTO: 91 FL — SIGNIFICANT CHANGE UP (ref 80–94)
PLATELET # BLD AUTO: 153 K/UL — SIGNIFICANT CHANGE UP (ref 150–400)
POTASSIUM SERPL-MCNC: 4.3 MMOL/L — SIGNIFICANT CHANGE UP (ref 3.5–5.3)
POTASSIUM SERPL-SCNC: 4.3 MMOL/L — SIGNIFICANT CHANGE UP (ref 3.5–5.3)
RBC # BLD: 5.11 M/UL — SIGNIFICANT CHANGE UP (ref 4.6–6.2)
RBC # FLD: 14.7 % — SIGNIFICANT CHANGE UP (ref 11–15.6)
SODIUM SERPL-SCNC: 137 MMOL/L — SIGNIFICANT CHANGE UP (ref 135–145)
WBC # BLD: 11.1 K/UL — HIGH (ref 4.8–10.8)
WBC # FLD AUTO: 11.1 K/UL — HIGH (ref 4.8–10.8)

## 2018-10-10 PROCEDURE — 99232 SBSQ HOSP IP/OBS MODERATE 35: CPT

## 2018-10-10 PROCEDURE — 99233 SBSQ HOSP IP/OBS HIGH 50: CPT

## 2018-10-10 RX ORDER — INSULIN LISPRO 100/ML
6 VIAL (ML) SUBCUTANEOUS
Qty: 0 | Refills: 0 | Status: DISCONTINUED | OUTPATIENT
Start: 2018-10-10 | End: 2018-10-11

## 2018-10-10 RX ADMIN — Medication 500000 UNIT(S): at 12:16

## 2018-10-10 RX ADMIN — Medication 1 PATCH: at 12:16

## 2018-10-10 RX ADMIN — Medication 4: at 08:17

## 2018-10-10 RX ADMIN — CLOPIDOGREL BISULFATE 75 MILLIGRAM(S): 75 TABLET, FILM COATED ORAL at 12:15

## 2018-10-10 RX ADMIN — PANTOPRAZOLE SODIUM 40 MILLIGRAM(S): 20 TABLET, DELAYED RELEASE ORAL at 07:15

## 2018-10-10 RX ADMIN — Medication 250 MILLIGRAM(S): at 17:30

## 2018-10-10 RX ADMIN — Medication 1 MILLIGRAM(S): at 07:14

## 2018-10-10 RX ADMIN — Medication 3 MILLILITER(S): at 15:03

## 2018-10-10 RX ADMIN — Medication 1 PATCH: at 12:18

## 2018-10-10 RX ADMIN — Medication 3 MILLILITER(S): at 20:23

## 2018-10-10 RX ADMIN — Medication 81 MILLIGRAM(S): at 12:15

## 2018-10-10 RX ADMIN — Medication 60 MILLIGRAM(S): at 07:15

## 2018-10-10 RX ADMIN — Medication 500000 UNIT(S): at 00:15

## 2018-10-10 RX ADMIN — Medication 4: at 12:16

## 2018-10-10 RX ADMIN — Medication 3 UNIT(S): at 12:16

## 2018-10-10 RX ADMIN — Medication 6 UNIT(S): at 17:29

## 2018-10-10 RX ADMIN — Medication 12: at 17:29

## 2018-10-10 RX ADMIN — SPIRONOLACTONE 12.5 MILLIGRAM(S): 25 TABLET, FILM COATED ORAL at 07:15

## 2018-10-10 RX ADMIN — ATORVASTATIN CALCIUM 40 MILLIGRAM(S): 80 TABLET, FILM COATED ORAL at 22:08

## 2018-10-10 RX ADMIN — Medication 250 MILLIGRAM(S): at 07:15

## 2018-10-10 RX ADMIN — Medication 500000 UNIT(S): at 07:14

## 2018-10-10 RX ADMIN — Medication 3 UNIT(S): at 08:17

## 2018-10-10 RX ADMIN — Medication 8: at 22:08

## 2018-10-10 RX ADMIN — Medication 12.5 MILLIGRAM(S): at 17:30

## 2018-10-10 RX ADMIN — Medication 3 MILLILITER(S): at 02:15

## 2018-10-10 RX ADMIN — Medication 650 MILLIGRAM(S): at 23:36

## 2018-10-10 RX ADMIN — INSULIN GLARGINE 45 UNIT(S): 100 INJECTION, SOLUTION SUBCUTANEOUS at 09:31

## 2018-10-10 RX ADMIN — Medication 3 MILLILITER(S): at 08:49

## 2018-10-10 RX ADMIN — ENOXAPARIN SODIUM 40 MILLIGRAM(S): 100 INJECTION SUBCUTANEOUS at 12:16

## 2018-10-10 RX ADMIN — Medication 1 MILLIGRAM(S): at 17:36

## 2018-10-10 NOTE — PROGRESS NOTE ADULT - SUBJECTIVE AND OBJECTIVE BOX
PULMONARY PROGRESS NOTE      DEVIKA CARBALLOANKIT-[id number]    Patient is a 58y old  Male who presents with a chief complaint of shortness of breath (10 Oct 2018 11:41)      INTERVAL HPI/OVERNIGHT EVENTS: Says he feels well. Wants to go home. On NCO2 at this time.     MEDICATIONS  (STANDING):  ALBUTerol/ipratropium for Nebulization 3 milliLiter(s) Nebulizer every 6 hours  ALPRAZolam 1 milliGRAM(s) Oral two times a day  aspirin enteric coated 81 milliGRAM(s) Oral daily  atorvastatin 40 milliGRAM(s) Oral at bedtime  clopidogrel Tablet 75 milliGRAM(s) Oral daily  dextrose 5%. 1000 milliLiter(s) (50 mL/Hr) IV Continuous <Continuous>  dextrose 50% Injectable 12.5 Gram(s) IV Push once  dextrose 50% Injectable 25 Gram(s) IV Push once  dextrose 50% Injectable 25 Gram(s) IV Push once  enoxaparin Injectable 40 milliGRAM(s) SubCutaneous daily  insulin glargine Injectable (LANTUS) 45 Unit(s) SubCutaneous every morning  insulin lispro (HumaLOG) corrective regimen sliding scale   SubCutaneous Before meals and at bedtime  insulin lispro Injectable (HumaLOG) 3 Unit(s) SubCutaneous three times a day before meals  lisinopril 5 milliGRAM(s) Oral daily  metoprolol tartrate 12.5 milliGRAM(s) Oral two times a day  nicotine - 21 mG/24Hr(s) Patch 1 patch Transdermal daily  nystatin    Suspension [id number] Unit(s) Oral four times a day  pantoprazole    Tablet 40 milliGRAM(s) Oral before breakfast  predniSONE   Tablet 60 milliGRAM(s) Oral daily  saccharomyces boulardii 250 milliGRAM(s) Oral two times a day  spironolactone 12.5 milliGRAM(s) Oral daily      MEDICATIONS  (PRN):  acetaminophen   Tablet .. 650 milliGRAM(s) Oral every 6 hours PRN Temp greater or equal to 38C (100.4F), Moderate Pain (4 - 6)  ALBUTerol    0.083% 2.5 milliGRAM(s) Nebulizer every 4 hours PRN Shortness of Breath and/or Wheezing  dextrose 40% Gel 15 Gram(s) Oral once PRN Blood Glucose LESS THAN 70 milliGRAM(s)/deciliter  glucagon  Injectable 1 milliGRAM(s) IntraMuscular once PRN Glucose LESS THAN 70 milligrams/deciliter      Allergies    No Known Allergies    Intolerances        PAST MEDICAL & SURGICAL HISTORY:  Oxygen dependent  Gastroesophageal reflux disease, esophagitis presence not specified  Smoker  Bipolar 1 disorder  Coronary artery disease involving native coronary artery of native heart without angina pectoris  Type 2 diabetes mellitus without complication, with long-term current use of insulin  Pulmonary HTN  HLD (hyperlipidemia)  Stented coronary artery  COPD (chronic obstructive pulmonary disease)  No significant past surgical history      SOCIAL HISTORY  Smoking History: current smoker      REVIEW OF SYSTEMS:    unable to get accurate ROS    Vital Signs Last 24 Hrs  T(C): 36.7 (10 Oct 2018 11:46), Max: 36.8 (09 Oct 2018 16:39)  T(F): 98.1 (10 Oct 2018 11:46), Max: 98.3 (09 Oct 2018 16:39)  HR: 95 (10 Oct 2018 11:46) (56 - 95)  BP: 120/83 (10 Oct 2018 11:46) (90/71 - 120/83)  BP(mean): --  RR: 18 (10 Oct 2018 11:46) (18 - 18)  SpO2: 99% (10 Oct 2018 05:30) (92% - 100%)    PHYSICAL EXAMINATION:    GENERAL: The patient is awake and alert in no apparent distress.     HEENT: Head is normocephalic and atraumatic. Mucous membranes are moist.    NECK: Supple.    LUNGS: Clear to auscultation without wheezing, rales or rhonchi; respirations unlabored    HEART: Regular rate and rhythm     ABDOMEN: Soft, nontender, and nondistended.      EXTREMITIES: Without any cyanosis, clubbing, rash, lesions or edema.    NEUROLOGIC: Grossly intact.    LABS:                        14.6   11.1  )-----------( 153      ( 10 Oct 2018 07:33 )             46.5     10-10    137  |  91<L>  |  20.0  ----------------------------<  296<H>  4.3   |  37.0<H>  |  0.50    Ca    8.5<L>      10 Oct 2018 07:33  Mg     2.1     10-09            RADIOLOGY & ADDITIONAL STUDIES:  < from: CT Angio Chest w/ IV Cont (10.05.18 @ 18:51) >     EXAM:  CT ANGIO CHEST (W)AW IC                          PROCEDURE DATE:  10/05/2018          INTERPRETATION:  CTA chest .  COMPARISON: None.  CLINICAL INFORMATION: chest pain, dyspnea.  TECHNIQUE: Contiguous axial 1.25 mm slice thickness images ofthe chest   were obtained after intravenous contrast administration utilizing PE   protocol.  Maximum intensity projection,(MIP) 3-D,  imaging was created and   interpreted.  100 mls of Omnipaque 300 was administered intravenously without   complication and 0 mls were discarded.    FINDINGS:  There are no pulmonary arterial filling defects to suggest pulmonary   embolism.    The mediastinum great vessels are normal.     There are no mediastinal masses or lymphadenopathy.     There is mild cardiomegaly with coronary artery calcifications. The   airway is patent showing normal caliber and contour.    There is LEFT lower lobe peripheral segmental airspace consolidation with   mild peribronchial lung lower lobe thickening and mucous plugging. .   RIGHT lung parenchyma and LEFT upper lobe remain clear..    There is no pleural effusion or pneumothorax.    Visualized upper abdominal viscera unremarkable.    The bones are normal.    IMPRESSION:    No evidence of pulmonary embolism.  LEFT lower lobe airspace consolidation concerning for infectious   pneumonia.                CHAU YAP M.D., ATTENDING RADIOLOGIST    < end of copied text > PULMONARY PROGRESS NOTE      DEVIKA CARBALLOANKIT-[id number]    Patient is a 58y old  Male who presents with a chief complaint of shortness of breath (10 Oct 2018 11:41)      INTERVAL HPI/OVERNIGHT EVENTS: Says he feels well. Wants to go home. Just placed on NCO2 at this time.     MEDICATIONS  (STANDING):  ALBUTerol/ipratropium for Nebulization 3 milliLiter(s) Nebulizer every 6 hours  ALPRAZolam 1 milliGRAM(s) Oral two times a day  aspirin enteric coated 81 milliGRAM(s) Oral daily  atorvastatin 40 milliGRAM(s) Oral at bedtime  clopidogrel Tablet 75 milliGRAM(s) Oral daily  dextrose 5%. 1000 milliLiter(s) (50 mL/Hr) IV Continuous <Continuous>  dextrose 50% Injectable 12.5 Gram(s) IV Push once  dextrose 50% Injectable 25 Gram(s) IV Push once  dextrose 50% Injectable 25 Gram(s) IV Push once  enoxaparin Injectable 40 milliGRAM(s) SubCutaneous daily  insulin glargine Injectable (LANTUS) 45 Unit(s) SubCutaneous every morning  insulin lispro (HumaLOG) corrective regimen sliding scale   SubCutaneous Before meals and at bedtime  insulin lispro Injectable (HumaLOG) 3 Unit(s) SubCutaneous three times a day before meals  lisinopril 5 milliGRAM(s) Oral daily  metoprolol tartrate 12.5 milliGRAM(s) Oral two times a day  nicotine - 21 mG/24Hr(s) Patch 1 patch Transdermal daily  nystatin    Suspension [id number] Unit(s) Oral four times a day  pantoprazole    Tablet 40 milliGRAM(s) Oral before breakfast  predniSONE   Tablet 60 milliGRAM(s) Oral daily  saccharomyces boulardii 250 milliGRAM(s) Oral two times a day  spironolactone 12.5 milliGRAM(s) Oral daily      MEDICATIONS  (PRN):  acetaminophen   Tablet .. 650 milliGRAM(s) Oral every 6 hours PRN Temp greater or equal to 38C (100.4F), Moderate Pain (4 - 6)  ALBUTerol    0.083% 2.5 milliGRAM(s) Nebulizer every 4 hours PRN Shortness of Breath and/or Wheezing  dextrose 40% Gel 15 Gram(s) Oral once PRN Blood Glucose LESS THAN 70 milliGRAM(s)/deciliter  glucagon  Injectable 1 milliGRAM(s) IntraMuscular once PRN Glucose LESS THAN 70 milligrams/deciliter      Allergies    No Known Allergies    Intolerances        PAST MEDICAL & SURGICAL HISTORY:  Oxygen dependent  Gastroesophageal reflux disease, esophagitis presence not specified  Smoker  Bipolar 1 disorder  Coronary artery disease involving native coronary artery of native heart without angina pectoris  Type 2 diabetes mellitus without complication, with long-term current use of insulin  Pulmonary HTN  HLD (hyperlipidemia)  Stented coronary artery  COPD (chronic obstructive pulmonary disease)  No significant past surgical history      SOCIAL HISTORY  Smoking History: current smoker      REVIEW OF SYSTEMS:    unable to get accurate ROS    Vital Signs Last 24 Hrs  T(C): 36.7 (10 Oct 2018 11:46), Max: 36.8 (09 Oct 2018 16:39)  T(F): 98.1 (10 Oct 2018 11:46), Max: 98.3 (09 Oct 2018 16:39)  HR: 95 (10 Oct 2018 11:46) (56 - 95)  BP: 120/83 (10 Oct 2018 11:46) (90/71 - 120/83)  BP(mean): --  RR: 18 (10 Oct 2018 11:46) (18 - 18)  SpO2: 99% (10 Oct 2018 05:30) (92% - 100%)    PHYSICAL EXAMINATION:    GENERAL: The patient is awake and alert in no apparent distress.     HEENT: Head is normocephalic and atraumatic. Mucous membranes are moist.    NECK: Supple.    LUNGS: Clear to auscultation without wheezing, rales or rhonchi; respirations unlabored    HEART: Regular rate and rhythm     ABDOMEN: Soft, nontender, and nondistended.      EXTREMITIES: Without any cyanosis, clubbing, rash, lesions or edema.    NEUROLOGIC: Grossly intact.    LABS:                        14.6   11.1  )-----------( 153      ( 10 Oct 2018 07:33 )             46.5     10-10    137  |  91<L>  |  20.0  ----------------------------<  296<H>  4.3   |  37.0<H>  |  0.50    Ca    8.5<L>      10 Oct 2018 07:33  Mg     2.1     10-09            RADIOLOGY & ADDITIONAL STUDIES:  < from: CT Angio Chest w/ IV Cont (10.05.18 @ 18:51) >     EXAM:  CT ANGIO CHEST (W)AW IC                          PROCEDURE DATE:  10/05/2018          INTERPRETATION:  CTA chest .  COMPARISON: None.  CLINICAL INFORMATION: chest pain, dyspnea.  TECHNIQUE: Contiguous axial 1.25 mm slice thickness images ofthe chest   were obtained after intravenous contrast administration utilizing PE   protocol.  Maximum intensity projection,(MIP) 3-D,  imaging was created and   interpreted.  100 mls of Omnipaque 300 was administered intravenously without   complication and 0 mls were discarded.    FINDINGS:  There are no pulmonary arterial filling defects to suggest pulmonary   embolism.    The mediastinum great vessels are normal.     There are no mediastinal masses or lymphadenopathy.     There is mild cardiomegaly with coronary artery calcifications. The   airway is patent showing normal caliber and contour.    There is LEFT lower lobe peripheral segmental airspace consolidation with   mild peribronchial lung lower lobe thickening and mucous plugging. .   RIGHT lung parenchyma and LEFT upper lobe remain clear..    There is no pleural effusion or pneumothorax.    Visualized upper abdominal viscera unremarkable.    The bones are normal.    IMPRESSION:    No evidence of pulmonary embolism.  LEFT lower lobe airspace consolidation concerning for infectious   pneumonia.                CHAU YAP M.D., ATTENDING RADIOLOGIST    < end of copied text >

## 2018-10-10 NOTE — PROGRESS NOTE ADULT - ASSESSMENT
> Acute on chronic hypercapnic and hypoxic respiratory failure - multifactorial (COPD Exacerbation, Pulmonary HTN, Viral Pneumonia and Acute Diastolic Heart Failure) - RVP is positive for Rhinovirus.  Continue Steroid taper, nebulizers.  Attempt to wean from high flow O2.  Discussed with pulmonary.  Smoking cessation emphasized.  Continue high flow O2, weaning as tolerated.    > Smoker - Nicotine Patch.  I strongly encouraged the patient to quit smoking.  I outlined the extensive negative impact of smoking on quality of life and the numerous consequences of continued smoking including lung disease, heart attack, stroke, vascular disease, malignancy, increased mortality, etc.  The patient understood these effects and agreed on smoking cessation.  > Acute Diastolic CHF - Continue Lisinopril, Lopressor, Aldactone.      > CAD and HLD - Continue Aspirin, Plavix, Lipitor and Metoprolol  > Diabetes Type II on longterm Insulin Therapy - monitor fingersticks.  Insulin coverage for hyperglycemia. Lantus, premeal insulin.  FS elevated, will increase Lantus.    DVT Prophylaxis -- Lovenox 40 mg

## 2018-10-10 NOTE — PROGRESS NOTE ADULT - SUBJECTIVE AND OBJECTIVE BOX
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 Found to have a positive rhinovirus on viral panel.  Admitted for COPD exacerbation / viral pneumonia.      Hospital Course:  Responded to IV steroids, nebulizers.  Has not been able to tolerate O2 via nasal cannula - desatted to ~ 82% on 4L nasal cannula.  No cough.  Reports SOB is improving.  No additional complaints.    ____________________PHYSICAL EXAM:  Vitals reviewed as indicated below  GENERAL:  NAD Alert and Oriented x 3   HEENT: NCAT  CARDIOVASCULAR:  S1, S2  LUNGS: coarse BS b/l   ABDOMEN:  soft, (-) tenderness, (-) distension, (+) bowel sounds, (-) guarding, (-) rebound (-) rigidity  EXTREMITIES:  s/p L BKA.  R foot areas of necrosis.   ____________________    VITALS:  Vital Signs Last 24 Hrs  T(C): 36.8 (10 Oct 2018 05:30), Max: 36.8 (09 Oct 2018 16:39)  T(F): 98.3 (10 Oct 2018 05:30), Max: 98.3 (09 Oct 2018 16:39)  HR: 71 (10 Oct 2018 07:01) (56 - 90)  BP: 98/68 (10 Oct 2018 07:01) (90/71 - 108/75)  BP(mean): --  RR: 18 (10 Oct 2018 05:30) (18 - 18)  SpO2: 99% (10 Oct 2018 05:30) (92% - 100%) Daily     Daily Weight in k.7 (09 Oct 2018 14:05)  CAPILLARY BLOOD GLUCOSE      POCT Blood Glucose.: 247 mg/dL (10 Oct 2018 08:08)  POCT Blood Glucose.: 220 mg/dL (09 Oct 2018 21:43)  POCT Blood Glucose.: 313 mg/dL (09 Oct 2018 16:48)    I&O's Summary    09 Oct 2018 07:01  -  10 Oct 2018 07:00  --------------------------------------------------------  IN: 960 mL / OUT: 0 mL / NET: 960 mL        LABS:                        14.6   11.1  )-----------( 153      ( 10 Oct 2018 07:33 )             46.5     10-10    137  |  91<L>  |  20.0  ----------------------------<  296<H>  4.3   |  37.0<H>  |  0.50    Ca    8.5<L>      10 Oct 2018 07:33  Mg     2.1     10-09                  MEDICATIONS:  acetaminophen   Tablet .. 650 milliGRAM(s) Oral every 6 hours PRN  ALBUTerol    0.083% 2.5 milliGRAM(s) Nebulizer every 4 hours PRN  ALBUTerol/ipratropium for Nebulization 3 milliLiter(s) Nebulizer every 6 hours  ALPRAZolam 1 milliGRAM(s) Oral two times a day  aspirin enteric coated 81 milliGRAM(s) Oral daily  atorvastatin 40 milliGRAM(s) Oral at bedtime  clopidogrel Tablet 75 milliGRAM(s) Oral daily  dextrose 40% Gel 15 Gram(s) Oral once PRN  dextrose 5%. 1000 milliLiter(s) IV Continuous <Continuous>  dextrose 50% Injectable 12.5 Gram(s) IV Push once  dextrose 50% Injectable 25 Gram(s) IV Push once  dextrose 50% Injectable 25 Gram(s) IV Push once  enoxaparin Injectable 40 milliGRAM(s) SubCutaneous daily  glucagon  Injectable 1 milliGRAM(s) IntraMuscular once PRN  insulin glargine Injectable (LANTUS) 45 Unit(s) SubCutaneous every morning  insulin lispro (HumaLOG) corrective regimen sliding scale   SubCutaneous Before meals and at bedtime  insulin lispro Injectable (HumaLOG) 3 Unit(s) SubCutaneous three times a day before meals  lisinopril 5 milliGRAM(s) Oral daily  metoprolol tartrate 12.5 milliGRAM(s) Oral two times a day  nicotine - 21 mG/24Hr(s) Patch 1 patch Transdermal daily  nystatin    Suspension 179233 Unit(s) Oral four times a day  pantoprazole    Tablet 40 milliGRAM(s) Oral before breakfast  predniSONE   Tablet 60 milliGRAM(s) Oral daily  saccharomyces boulardii 250 milliGRAM(s) Oral two times a day  spironolactone 12.5 milliGRAM(s) Oral daily

## 2018-10-10 NOTE — PROGRESS NOTE ADULT - ASSESSMENT
-AECOPD; underlying stage 4 dz  -Hx pulm htn  -No PE  -Acute on chronic hypoxic resp failure; trying to wean off HFO2  -Chronic hypercarbic resp failure; pt refusing bpap  -Diastolic CHF  -Rhinovirus infection  -Abnormal CT chest; infiltrate vs atelectasis. No fever, no wbc  -Smoker    RECC:    Titrate down to get to level he can use at home. Once on NCO2 and tolerating, can d/c to rehab vs home. Refusing noct bpap encouraged. Taper prednisone.  Nebs. Smoking cessation a must. DVT prophylaxis. Need to follow CT chest to resolution at outpt.

## 2018-10-11 DIAGNOSIS — I27.20 PULMONARY HYPERTENSION, UNSPECIFIED: ICD-10-CM

## 2018-10-11 LAB
ANION GAP SERPL CALC-SCNC: 5 MMOL/L — SIGNIFICANT CHANGE UP (ref 5–17)
BUN SERPL-MCNC: 22 MG/DL — HIGH (ref 8–20)
CALCIUM SERPL-MCNC: 9 MG/DL — SIGNIFICANT CHANGE UP (ref 8.6–10.2)
CHLORIDE SERPL-SCNC: 93 MMOL/L — LOW (ref 98–107)
CO2 SERPL-SCNC: 40 MMOL/L — HIGH (ref 22–29)
CREAT SERPL-MCNC: 0.58 MG/DL — SIGNIFICANT CHANGE UP (ref 0.5–1.3)
GLUCOSE BLDC GLUCOMTR-MCNC: 280 MG/DL — HIGH (ref 70–99)
GLUCOSE BLDC GLUCOMTR-MCNC: 296 MG/DL — HIGH (ref 70–99)
GLUCOSE BLDC GLUCOMTR-MCNC: 303 MG/DL — HIGH (ref 70–99)
GLUCOSE BLDC GLUCOMTR-MCNC: 351 MG/DL — HIGH (ref 70–99)
GLUCOSE SERPL-MCNC: 256 MG/DL — HIGH (ref 70–115)
HCT VFR BLD CALC: 45.5 % — SIGNIFICANT CHANGE UP (ref 42–52)
HGB BLD-MCNC: 14.2 G/DL — SIGNIFICANT CHANGE UP (ref 14–18)
MCHC RBC-ENTMCNC: 28.8 PG — SIGNIFICANT CHANGE UP (ref 27–31)
MCHC RBC-ENTMCNC: 31.2 G/DL — LOW (ref 32–36)
MCV RBC AUTO: 92.3 FL — SIGNIFICANT CHANGE UP (ref 80–94)
PLATELET # BLD AUTO: 147 K/UL — LOW (ref 150–400)
POTASSIUM SERPL-MCNC: 4.2 MMOL/L — SIGNIFICANT CHANGE UP (ref 3.5–5.3)
POTASSIUM SERPL-SCNC: 4.2 MMOL/L — SIGNIFICANT CHANGE UP (ref 3.5–5.3)
RBC # BLD: 4.93 M/UL — SIGNIFICANT CHANGE UP (ref 4.6–6.2)
RBC # FLD: 14.6 % — SIGNIFICANT CHANGE UP (ref 11–15.6)
SODIUM SERPL-SCNC: 138 MMOL/L — SIGNIFICANT CHANGE UP (ref 135–145)
WBC # BLD: 10.4 K/UL — SIGNIFICANT CHANGE UP (ref 4.8–10.8)
WBC # FLD AUTO: 10.4 K/UL — SIGNIFICANT CHANGE UP (ref 4.8–10.8)

## 2018-10-11 PROCEDURE — 99232 SBSQ HOSP IP/OBS MODERATE 35: CPT

## 2018-10-11 RX ORDER — LISINOPRIL 2.5 MG/1
2.5 TABLET ORAL DAILY
Qty: 0 | Refills: 0 | Status: DISCONTINUED | OUTPATIENT
Start: 2018-10-12 | End: 2018-11-01

## 2018-10-11 RX ORDER — INSULIN GLARGINE 100 [IU]/ML
50 INJECTION, SOLUTION SUBCUTANEOUS AT BEDTIME
Qty: 0 | Refills: 0 | Status: DISCONTINUED | OUTPATIENT
Start: 2018-10-11 | End: 2018-10-12

## 2018-10-11 RX ORDER — INSULIN LISPRO 100/ML
10 VIAL (ML) SUBCUTANEOUS
Qty: 0 | Refills: 0 | Status: DISCONTINUED | OUTPATIENT
Start: 2018-10-11 | End: 2018-10-12

## 2018-10-11 RX ORDER — SPIRONOLACTONE 25 MG/1
12.5 TABLET, FILM COATED ORAL DAILY
Qty: 0 | Refills: 0 | Status: DISCONTINUED | OUTPATIENT
Start: 2018-10-12 | End: 2018-11-01

## 2018-10-11 RX ADMIN — Medication 1 PATCH: at 12:34

## 2018-10-11 RX ADMIN — Medication 650 MILLIGRAM(S): at 06:32

## 2018-10-11 RX ADMIN — Medication 250 MILLIGRAM(S): at 06:29

## 2018-10-11 RX ADMIN — Medication 10 UNIT(S): at 12:30

## 2018-10-11 RX ADMIN — ATORVASTATIN CALCIUM 40 MILLIGRAM(S): 80 TABLET, FILM COATED ORAL at 21:54

## 2018-10-11 RX ADMIN — CLOPIDOGREL BISULFATE 75 MILLIGRAM(S): 75 TABLET, FILM COATED ORAL at 12:30

## 2018-10-11 RX ADMIN — Medication 10: at 12:29

## 2018-10-11 RX ADMIN — PANTOPRAZOLE SODIUM 40 MILLIGRAM(S): 20 TABLET, DELAYED RELEASE ORAL at 06:29

## 2018-10-11 RX ADMIN — Medication 650 MILLIGRAM(S): at 00:36

## 2018-10-11 RX ADMIN — Medication 60 MILLIGRAM(S): at 06:29

## 2018-10-11 RX ADMIN — Medication 3 MILLILITER(S): at 20:40

## 2018-10-11 RX ADMIN — INSULIN GLARGINE 50 UNIT(S): 100 INJECTION, SOLUTION SUBCUTANEOUS at 22:54

## 2018-10-11 RX ADMIN — Medication 6: at 22:09

## 2018-10-11 RX ADMIN — Medication 3 MILLILITER(S): at 09:03

## 2018-10-11 RX ADMIN — INSULIN GLARGINE 45 UNIT(S): 100 INJECTION, SOLUTION SUBCUTANEOUS at 08:32

## 2018-10-11 RX ADMIN — Medication 650 MILLIGRAM(S): at 07:27

## 2018-10-11 RX ADMIN — Medication 81 MILLIGRAM(S): at 12:45

## 2018-10-11 RX ADMIN — Medication 3 MILLILITER(S): at 16:25

## 2018-10-11 RX ADMIN — Medication 6 UNIT(S): at 08:31

## 2018-10-11 RX ADMIN — ENOXAPARIN SODIUM 40 MILLIGRAM(S): 100 INJECTION SUBCUTANEOUS at 12:31

## 2018-10-11 RX ADMIN — Medication 12.5 MILLIGRAM(S): at 18:06

## 2018-10-11 RX ADMIN — Medication 3 MILLILITER(S): at 03:07

## 2018-10-11 RX ADMIN — Medication 8: at 18:06

## 2018-10-11 RX ADMIN — Medication 250 MILLIGRAM(S): at 18:07

## 2018-10-11 RX ADMIN — Medication 1 PATCH: at 12:31

## 2018-10-11 RX ADMIN — Medication 6: at 08:32

## 2018-10-11 RX ADMIN — Medication 10 UNIT(S): at 18:06

## 2018-10-11 NOTE — PROGRESS NOTE ADULT - ASSESSMENT
> Acute on chronic hypercapnic and hypoxic respiratory failure - multifactorial (COPD Exacerbation, Pulmonary HTN, Viral Pneumonia and Acute Diastolic Heart Failure) - RVP is positive for Rhinovirus.  Continue Steroid taper, nebulizers.  Pt continues to require high FIO2 requirements.  Continue to wean O2 if tolerated.  Encourage OOB.  Smoking cessation emphasized.    > Smoker - Nicotine Patch.  I strongly encouraged the patient to quit smoking.  I outlined the extensive negative impact of smoking on quality of life and the numerous consequences of continued smoking including lung disease, heart attack, stroke, vascular disease, malignancy, increased mortality, etc.  The patient understood these effects and agreed on smoking cessation.  > Acute Diastolic CHF - D/c ACEI.  Hold aldactone due to hypotension.  If BP remains low, may need to D/c BBlocker.  > CAD and HLD - Continue Aspirin, Plavix, Lipitor and Metoprolol  > Diabetes Type II on longterm Insulin Therapy - FS still elevated.  Increased Lantus to 50 units daily.  Continue Premeal insulin.  Insulin coverage  DVT Prophylaxis -- Lovenox 40 mg    The Hospitalist Service will assume care of this patient beginning tomorrow.

## 2018-10-11 NOTE — PROGRESS NOTE ADULT - SUBJECTIVE AND OBJECTIVE BOX
PULMONARY PROGRESS NOTE      DEVIKA CARBALLOANKIT-[id number]    Patient is a 58y old  Male who presents with a chief complaint of shortness of breath (11 Oct 2018 09:47)      INTERVAL HPI/OVERNIGHT EVENTS:    MEDICATIONS  (STANDING):  ALBUTerol/ipratropium for Nebulization 3 milliLiter(s) Nebulizer every 6 hours  aspirin enteric coated 81 milliGRAM(s) Oral daily  atorvastatin 40 milliGRAM(s) Oral at bedtime  clopidogrel Tablet 75 milliGRAM(s) Oral daily  dextrose 5%. 1000 milliLiter(s) (50 mL/Hr) IV Continuous <Continuous>  dextrose 50% Injectable 12.5 Gram(s) IV Push once  dextrose 50% Injectable 25 Gram(s) IV Push once  dextrose 50% Injectable 25 Gram(s) IV Push once  enoxaparin Injectable 40 milliGRAM(s) SubCutaneous daily  insulin glargine Injectable (LANTUS) 50 Unit(s) SubCutaneous at bedtime  insulin lispro (HumaLOG) corrective regimen sliding scale   SubCutaneous Before meals and at bedtime  insulin lispro Injectable (HumaLOG) 10 Unit(s) SubCutaneous three times a day before meals  metoprolol tartrate 12.5 milliGRAM(s) Oral two times a day  nicotine - 21 mG/24Hr(s) Patch 1 patch Transdermal daily  nystatin    Suspension 957624 Unit(s) Oral four times a day  pantoprazole    Tablet 40 milliGRAM(s) Oral before breakfast  predniSONE   Tablet 60 milliGRAM(s) Oral daily  saccharomyces boulardii 250 milliGRAM(s) Oral two times a day      MEDICATIONS  (PRN):  acetaminophen   Tablet .. 650 milliGRAM(s) Oral every 6 hours PRN Temp greater or equal to 38C (100.4F), Moderate Pain (4 - 6)  ALBUTerol    0.083% 2.5 milliGRAM(s) Nebulizer every 4 hours PRN Shortness of Breath and/or Wheezing  dextrose 40% Gel 15 Gram(s) Oral once PRN Blood Glucose LESS THAN 70 milliGRAM(s)/deciliter  glucagon  Injectable 1 milliGRAM(s) IntraMuscular once PRN Glucose LESS THAN 70 milligrams/deciliter      Allergies    No Known Allergies    Intolerances        PAST MEDICAL & SURGICAL HISTORY:  Oxygen dependent  Gastroesophageal reflux disease, esophagitis presence not specified  Smoker  Bipolar 1 disorder  Coronary artery disease involving native coronary artery of native heart without angina pectoris  Type 2 diabetes mellitus without complication, with long-term current use of insulin  Pulmonary HTN  HLD (hyperlipidemia)  Stented coronary artery  COPD (chronic obstructive pulmonary disease)  No significant past surgical history      SOCIAL HISTORY  Smoking History:       REVIEW OF SYSTEMS:    CONSTITUTIONAL:  No distress    HEENT:  Eyes:  No diplopia or blurred vision. ENT:  No earache, sore throat or runny nose.    CARDIOVASCULAR:  No pressure, squeezing, tightness, heaviness or aching about the chest; no palpitations.    RESPIRATORY:  No cough, shortness of breath, PND or orthopnea. Mild SOBOE    GASTROINTESTINAL:  No nausea, vomiting or diarrhea.    GENITOURINARY:  No dysuria, frequency or urgency.    MUSCULOSKELETAL:  No joint pain    SKIN:  No new lesions.    NEUROLOGIC:  No paresthesias, fasciculations, seizures or weakness.    PSYCHIATRIC:  No disorder of thought or mood.    ENDOCRINE:  No heat or cold intolerance, polyuria or polydipsia.    HEMATOLOGICAL:  No easy bruising or bleeding.     Vital Signs Last 24 Hrs  T(C): 36.5 (11 Oct 2018 11:30), Max: 36.6 (10 Oct 2018 22:17)  T(F): 97.7 (11 Oct 2018 11:30), Max: 97.9 (10 Oct 2018 22:17)  HR: 85 (11 Oct 2018 11:30) (67 - 86)  BP: 102/71 (11 Oct 2018 11:30) (78/48 - 102/71)  BP(mean): --  RR: 18 (11 Oct 2018 11:30) (18 - 23)  SpO2: 94% (11 Oct 2018 09:07) (92% - 99%)    PHYSICAL EXAMINATION:    GENERAL: The patient is awake and alert in no apparent distress.     HEENT: Head is normocephalic and atraumatic. Extraocular muscles are intact. Mucous membranes are moist.    NECK: Supple.    LUNGS: Clear to auscultation without wheezing, rales or rhonchi; respirations unlabored    HEART: Regular rate and rhythm without murmur.    ABDOMEN: Soft, nontender, and nondistended.      EXTREMITIES: Without any cyanosis, clubbing, rash, lesions or edema.    NEUROLOGIC: Grossly intact.    SKIN: No ulceration or induration present.      LABS:                        14.2   10.4  )-----------( 147      ( 11 Oct 2018 07:17 )             45.5     10-11    138  |  93<L>  |  22.0<H>  ----------------------------<  256<H>  4.2   |  40.0<H>  |  0.58    Ca    9.0      11 Oct 2018 07:17                          MICROBIOLOGY:    RADIOLOGY & ADDITIONAL STUDIES:< from: CT Angio Chest w/ IV Cont (10.05.18 @ 18:51) >      INTERPRETATION:  CTA chest .  COMPARISON: None.  CLINICAL INFORMATION: chest pain, dyspnea.  TECHNIQUE: Contiguous axial 1.25 mm slice thickness images ofthe chest   were obtained after intravenous contrast administration utilizing PE   protocol.  Maximum intensity projection,(MIP) 3-D,  imaging was created and   interpreted.  100 mls of Omnipaque 300 was administered intravenously without   complication and 0 mls were discarded.    FINDINGS:  There are no pulmonary arterial filling defects to suggest pulmonary   embolism.    The mediastinum great vessels are normal.     There are no mediastinal masses or lymphadenopathy.     There is mild cardiomegaly with coronary artery calcifications. The   airway is patent showing normal caliber and contour.    There is LEFT lower lobe peripheral segmental airspace consolidation with   mild peribronchial lung lower lobe thickening and mucous plugging. .   RIGHT lung parenchyma and LEFT upper lobe remain clear..    There is no pleural effusion or pneumothorax.    Visualized upper abdominal viscera unremarkable.    The bones are normal.    IMPRESSION:    No evidence of pulmonary embolism.  LEFT lower lobe airspace consolidation concerning for infectious   pneumonia.                CHAU YAP M.D., ATTENDING RADIOLOGIST  This document has been electronically signed. Oct  5 2018  7:03PM        < end of copied text >

## 2018-10-11 NOTE — PROGRESS NOTE ADULT - ASSESSMENT
59 yo male with severe COPD and pulmon HT  Current smoker.  Says he has oxygen at home (!)  Patient ambulating well    He insists he is at baseline and anxious to go home  I think he can be discharged soon

## 2018-10-11 NOTE — PROGRESS NOTE ADULT - SUBJECTIVE AND OBJECTIVE BOX
Patient: DEVIKA CARBALLO [id number] 58y Male                           Internal Medicine Hospitalist Progress Note  Chief Complaint: Patient is a 58y old  Male who presents with a chief complaint of shortness of breath (09 Oct 2018 11:22)    HPI:  57 yo male with a pmh/o pulmonary HTN, GERD, Bipolar disorder, HTN, HLD, current everyday 0.5 PPD smoker DMII on insulin severe COPD/emphysema on home oxygen 2L (which his sister bought after cardiology in [place] recommended it after last hospitalization) and CHF with last exacerbation in 2015 with PCI intervention (states it was a balloon for a 95% occlusion of his right pulmonary artery?) for which he was hospitalized in [place], who presented with complaints of worsening shortness of breath and congestion. Found to have a positive rhinovirus on viral panel.  Admitted for COPD exacerbation / viral pneumonia.      Hospital Course:  Responded to IV steroids, nebulizers.  He was able to tolerate O2 via nasal cannula yesterday.  Today, requires 7L/min via ventimask.  Per RN, SaO2 drops to 84% with ambulation.  No cough.  Reports SOB is improving.  No additional complaints.    ____________________PHYSICAL EXAM:  Vitals reviewed as indicated below  GENERAL:  NAD Alert and Oriented x 3   HEENT: NCAT  CARDIOVASCULAR:  S1, S2  LUNGS: coarse BS b/l   ABDOMEN:  soft, (-) tenderness, (-) distension, (+) bowel sounds, (-) guarding, (-) rebound (-) rigidity  EXTREMITIES:  s/p L BKA.  R foot areas of necrosis.   ____________________    VITALS:  Vital Signs Last 24 Hrs  T(C): 36.6 (11 Oct 2018 04:11), Max: 37 (10 Oct 2018 15:40)  T(F): 97.8 (11 Oct 2018 04:11), Max: 98.6 (10 Oct 2018 15:40)  HR: 85 (11 Oct 2018 09:07) (67 - 105)  BP: 82/58 (11 Oct 2018 04:39) (78/48 - 120/83)  BP(mean): --  RR: 23 (10 Oct 2018 22:17) (18 - 32)  SpO2: 94% (11 Oct 2018 09:07) (91% - 99%) Daily     Daily   CAPILLARY BLOOD GLUCOSE      POCT Blood Glucose.: 296 mg/dL (11 Oct 2018 08:09)  POCT Blood Glucose.: 317 mg/dL (10 Oct 2018 22:06)  POCT Blood Glucose.: 441 mg/dL (10 Oct 2018 17:28)  POCT Blood Glucose.: 244 mg/dL (10 Oct 2018 12:15)    I&O's Summary      LABS:                        14.2   10.4  )-----------( 147      ( 11 Oct 2018 07:17 )             45.5     10-11    138  |  93<L>  |  22.0<H>  ----------------------------<  256<H>  4.2   |  40.0<H>  |  0.58    Ca    9.0      11 Oct 2018 07:17                  MEDICATIONS:  acetaminophen   Tablet .. 650 milliGRAM(s) Oral every 6 hours PRN  ALBUTerol    0.083% 2.5 milliGRAM(s) Nebulizer every 4 hours PRN  ALBUTerol/ipratropium for Nebulization 3 milliLiter(s) Nebulizer every 6 hours  aspirin enteric coated 81 milliGRAM(s) Oral daily  atorvastatin 40 milliGRAM(s) Oral at bedtime  clopidogrel Tablet 75 milliGRAM(s) Oral daily  dextrose 40% Gel 15 Gram(s) Oral once PRN  dextrose 5%. 1000 milliLiter(s) IV Continuous <Continuous>  dextrose 50% Injectable 12.5 Gram(s) IV Push once  dextrose 50% Injectable 25 Gram(s) IV Push once  dextrose 50% Injectable 25 Gram(s) IV Push once  enoxaparin Injectable 40 milliGRAM(s) SubCutaneous daily  glucagon  Injectable 1 milliGRAM(s) IntraMuscular once PRN  insulin glargine Injectable (LANTUS) 45 Unit(s) SubCutaneous every morning  insulin lispro (HumaLOG) corrective regimen sliding scale   SubCutaneous Before meals and at bedtime  insulin lispro Injectable (HumaLOG) 10 Unit(s) SubCutaneous three times a day before meals  metoprolol tartrate 12.5 milliGRAM(s) Oral two times a day  nicotine - 21 mG/24Hr(s) Patch 1 patch Transdermal daily  nystatin    Suspension [id number] Unit(s) Oral four times a day  pantoprazole    Tablet 40 milliGRAM(s) Oral before breakfast  predniSONE   Tablet 60 milliGRAM(s) Oral daily  saccharomyces boulardii 250 milliGRAM(s) Oral two times a day

## 2018-10-11 NOTE — PROGRESS NOTE ADULT - ASSESSMENT
57 yo M with pulmonary HTN, GERD, Bipolar disorder, HTN, HLD, daily tobacco smoker T2DM on insulin, CAD s/p PCI in 2016 (RCA?) by Dr. 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 RV severely enlarged. RV function reduced. RA severely enlarged Trace MR. Mod TR. PASP 56mmHg.  COPD; underlying stage 4 dz  pHTN  Acute on chronic hypercapnic and hypoxic respiratory failure  Rhinovirus infection  Smoker        P/  On VM  Comfortable   spironolactone added, tolerated  BP soft  (82/58) yet comfortable: Monitor, may need to reduce meds dose if hypotension continues

## 2018-10-12 DIAGNOSIS — F41.9 ANXIETY DISORDER, UNSPECIFIED: ICD-10-CM

## 2018-10-12 LAB
ANION GAP SERPL CALC-SCNC: 9 MMOL/L — SIGNIFICANT CHANGE UP (ref 5–17)
BUN SERPL-MCNC: 20 MG/DL — SIGNIFICANT CHANGE UP (ref 8–20)
CALCIUM SERPL-MCNC: 8.8 MG/DL — SIGNIFICANT CHANGE UP (ref 8.6–10.2)
CHLORIDE SERPL-SCNC: 91 MMOL/L — LOW (ref 98–107)
CO2 SERPL-SCNC: 35 MMOL/L — HIGH (ref 22–29)
CREAT SERPL-MCNC: 0.47 MG/DL — LOW (ref 0.5–1.3)
GLUCOSE BLDC GLUCOMTR-MCNC: 231 MG/DL — HIGH (ref 70–99)
GLUCOSE BLDC GLUCOMTR-MCNC: 249 MG/DL — HIGH (ref 70–99)
GLUCOSE BLDC GLUCOMTR-MCNC: 295 MG/DL — HIGH (ref 70–99)
GLUCOSE BLDC GLUCOMTR-MCNC: 303 MG/DL — HIGH (ref 70–99)
GLUCOSE SERPL-MCNC: 315 MG/DL — HIGH (ref 70–115)
HCT VFR BLD CALC: 46.4 % — SIGNIFICANT CHANGE UP (ref 42–52)
HGB BLD-MCNC: 14.5 G/DL — SIGNIFICANT CHANGE UP (ref 14–18)
MCHC RBC-ENTMCNC: 28.8 PG — SIGNIFICANT CHANGE UP (ref 27–31)
MCHC RBC-ENTMCNC: 31.3 G/DL — LOW (ref 32–36)
MCV RBC AUTO: 92.1 FL — SIGNIFICANT CHANGE UP (ref 80–94)
PLATELET # BLD AUTO: 163 K/UL — SIGNIFICANT CHANGE UP (ref 150–400)
POTASSIUM SERPL-MCNC: 4.5 MMOL/L — SIGNIFICANT CHANGE UP (ref 3.5–5.3)
POTASSIUM SERPL-SCNC: 4.5 MMOL/L — SIGNIFICANT CHANGE UP (ref 3.5–5.3)
RBC # BLD: 5.04 M/UL — SIGNIFICANT CHANGE UP (ref 4.6–6.2)
RBC # FLD: 14.6 % — SIGNIFICANT CHANGE UP (ref 11–15.6)
SODIUM SERPL-SCNC: 135 MMOL/L — SIGNIFICANT CHANGE UP (ref 135–145)
WBC # BLD: 12.6 K/UL — HIGH (ref 4.8–10.8)
WBC # FLD AUTO: 12.6 K/UL — HIGH (ref 4.8–10.8)

## 2018-10-12 PROCEDURE — 99232 SBSQ HOSP IP/OBS MODERATE 35: CPT

## 2018-10-12 PROCEDURE — 99233 SBSQ HOSP IP/OBS HIGH 50: CPT

## 2018-10-12 RX ORDER — ATORVASTATIN CALCIUM 80 MG/1
10 TABLET, FILM COATED ORAL AT BEDTIME
Qty: 0 | Refills: 0 | Status: DISCONTINUED | OUTPATIENT
Start: 2018-10-12 | End: 2018-10-12

## 2018-10-12 RX ORDER — INSULIN GLARGINE 100 [IU]/ML
60 INJECTION, SOLUTION SUBCUTANEOUS AT BEDTIME
Qty: 0 | Refills: 0 | Status: DISCONTINUED | OUTPATIENT
Start: 2018-10-12 | End: 2018-10-14

## 2018-10-12 RX ORDER — INSULIN LISPRO 100/ML
12 VIAL (ML) SUBCUTANEOUS
Qty: 0 | Refills: 0 | Status: DISCONTINUED | OUTPATIENT
Start: 2018-10-12 | End: 2018-10-13

## 2018-10-12 RX ORDER — CLONAZEPAM 1 MG
0.5 TABLET ORAL THREE TIMES A DAY
Qty: 0 | Refills: 0 | Status: DISCONTINUED | OUTPATIENT
Start: 2018-10-12 | End: 2018-10-13

## 2018-10-12 RX ADMIN — CLOPIDOGREL BISULFATE 75 MILLIGRAM(S): 75 TABLET, FILM COATED ORAL at 11:50

## 2018-10-12 RX ADMIN — ENOXAPARIN SODIUM 40 MILLIGRAM(S): 100 INJECTION SUBCUTANEOUS at 11:50

## 2018-10-12 RX ADMIN — Medication 8: at 08:21

## 2018-10-12 RX ADMIN — Medication 3 MILLILITER(S): at 16:04

## 2018-10-12 RX ADMIN — Medication 3 MILLILITER(S): at 21:05

## 2018-10-12 RX ADMIN — ATORVASTATIN CALCIUM 40 MILLIGRAM(S): 80 TABLET, FILM COATED ORAL at 21:35

## 2018-10-12 RX ADMIN — Medication 250 MILLIGRAM(S): at 17:26

## 2018-10-12 RX ADMIN — Medication 250 MILLIGRAM(S): at 05:34

## 2018-10-12 RX ADMIN — Medication 10 UNIT(S): at 08:20

## 2018-10-12 RX ADMIN — Medication 60 MILLIGRAM(S): at 05:34

## 2018-10-12 RX ADMIN — Medication 4: at 17:27

## 2018-10-12 RX ADMIN — Medication 12 UNIT(S): at 17:26

## 2018-10-12 RX ADMIN — Medication 3 MILLILITER(S): at 03:39

## 2018-10-12 RX ADMIN — Medication 1 PATCH: at 11:40

## 2018-10-12 RX ADMIN — SPIRONOLACTONE 12.5 MILLIGRAM(S): 25 TABLET, FILM COATED ORAL at 05:37

## 2018-10-12 RX ADMIN — Medication 1 PATCH: at 11:50

## 2018-10-12 RX ADMIN — Medication 650 MILLIGRAM(S): at 03:55

## 2018-10-12 RX ADMIN — Medication 12 UNIT(S): at 11:51

## 2018-10-12 RX ADMIN — Medication 0.5 MILLIGRAM(S): at 21:42

## 2018-10-12 RX ADMIN — INSULIN GLARGINE 60 UNIT(S): 100 INJECTION, SOLUTION SUBCUTANEOUS at 22:41

## 2018-10-12 RX ADMIN — Medication 6: at 11:51

## 2018-10-12 RX ADMIN — Medication 500000 UNIT(S): at 11:51

## 2018-10-12 RX ADMIN — Medication 500000 UNIT(S): at 17:26

## 2018-10-12 RX ADMIN — PANTOPRAZOLE SODIUM 40 MILLIGRAM(S): 20 TABLET, DELAYED RELEASE ORAL at 05:36

## 2018-10-12 RX ADMIN — Medication 4: at 21:52

## 2018-10-12 RX ADMIN — LISINOPRIL 2.5 MILLIGRAM(S): 2.5 TABLET ORAL at 05:35

## 2018-10-12 RX ADMIN — Medication 81 MILLIGRAM(S): at 11:50

## 2018-10-12 RX ADMIN — Medication 650 MILLIGRAM(S): at 04:35

## 2018-10-12 RX ADMIN — Medication 3 MILLILITER(S): at 09:19

## 2018-10-12 RX ADMIN — Medication 12.5 MILLIGRAM(S): at 05:35

## 2018-10-12 NOTE — PROGRESS NOTE ADULT - ASSESSMENT
Patient with COPD/Pneumonia improved  Now on conventional O2    Plan:  1.Continue nasal O2 >will need to continue as outpatient currently at 5 liters  2.Nebulizer with Duoneb q6h  3.Taper steroids  4.Outpatient followup with us to evaluate function  5.Would benefit from TACO  Little to add at this point.

## 2018-10-12 NOTE — PROGRESS NOTE ADULT - SUBJECTIVE AND OBJECTIVE BOX
HARIS DEVIKA Male 58y [id number]    Patient is a 58y old  Male who presents with a chief complaint of shortness of breath (12 Oct 2018 09:13)      On service note:   Pt seen/ examined at bedside and charts reviewed, no over night event reported by night staff. Pt wants to go home, state my breathing better, no CP or other complaints. On O2NC- Reports using O2 at home.     Review of system:  No fever, chills, nausea, vomiting, headache, dizziness, chest pain, SOB or palpitation.      PHYSICAL EXAM:    Vital Signs Last 24 Hrs  T(C): 36.6 (12 Oct 2018 04:00), Max: 37.1 (11 Oct 2018 21:51)  T(F): 97.9 (12 Oct 2018 04:00), Max: 98.7 (11 Oct 2018 21:51)  HR: 67 (12 Oct 2018 09:23) (64 - 87)  BP: 112/67 (12 Oct 2018 04:00) (90/54 - 112/67)  BP(mean): --  RR: 18 (12 Oct 2018 04:00) (18 - 18)  SpO2: 91% (12 Oct 2018 09:23) (91% - 98%)    GENERAL: Pt lying comfortably, NAD.  CHEST/LUNG: Clear to auscultation bilaterally; No wheezing.  HEART: S1S2+, Regular rate and rhythm; No murmurs.  ABDOMEN: Soft, Nontender, Nondistended; Bowel sounds present.  NEURO: AAOX3, no focal deficits, no motor r sensory loss.  PSYCH: normal mood.          MEDICATIONS  (STANDING):  ALBUTerol/ipratropium for Nebulization 3 milliLiter(s) Nebulizer every 6 hours  aspirin enteric coated 81 milliGRAM(s) Oral daily  atorvastatin 40 milliGRAM(s) Oral at bedtime  atorvastatin 10 milliGRAM(s) Oral at bedtime  clopidogrel Tablet 75 milliGRAM(s) Oral daily  dextrose 5%. 1000 milliLiter(s) (50 mL/Hr) IV Continuous <Continuous>  dextrose 50% Injectable 12.5 Gram(s) IV Push once  dextrose 50% Injectable 25 Gram(s) IV Push once  dextrose 50% Injectable 25 Gram(s) IV Push once  enoxaparin Injectable 40 milliGRAM(s) SubCutaneous daily  insulin glargine Injectable (LANTUS) 50 Unit(s) SubCutaneous at bedtime  insulin lispro (HumaLOG) corrective regimen sliding scale   SubCutaneous Before meals and at bedtime  insulin lispro Injectable (HumaLOG) 10 Unit(s) SubCutaneous three times a day before meals  lisinopril 2.5 milliGRAM(s) Oral daily  nicotine - 21 mG/24Hr(s) Patch 1 patch Transdermal daily  nystatin    Suspension [id number] Unit(s) Oral four times a day  pantoprazole    Tablet 40 milliGRAM(s) Oral before breakfast  predniSONE   Tablet 60 milliGRAM(s) Oral daily  saccharomyces boulardii 250 milliGRAM(s) Oral two times a day  spironolactone 12.5 milliGRAM(s) Oral daily    MEDICATIONS  (PRN):  acetaminophen   Tablet .. 650 milliGRAM(s) Oral every 6 hours PRN Temp greater or equal to 38C (100.4F), Moderate Pain (4 - 6)  ALBUTerol    0.083% 2.5 milliGRAM(s) Nebulizer every 4 hours PRN Shortness of Breath and/or Wheezing  dextrose 40% Gel 15 Gram(s) Oral once PRN Blood Glucose LESS THAN 70 milliGRAM(s)/deciliter  glucagon  Injectable 1 milliGRAM(s) IntraMuscular once PRN Glucose LESS THAN 70 milligrams/deciliter        Labs:  LABS:                        14.2   10.4  )-----------( 147      ( 11 Oct 2018 07:17 )             45.5     10-12    135  |  91<L>  |  20.0  ----------------------------<  315<H>  4.5   |  35.0<H>  |  0.47<L>    Ca    8.8      12 Oct 2018 08:55
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
## 2018-10-12 NOTE — PROGRESS NOTE ADULT - SUBJECTIVE AND OBJECTIVE BOX
PULMONARY PROGRESS NOTE      DEVIKA CARBALLOANKIT-[id number]    Patient is a 58y old  Male who presents with a chief complaint of shortness of breath (12 Oct 2018 09:58)      INTERVAL HPI/OVERNIGHT EVENTS:  On conventional O2  Feels well wants to go home  Issues related to d/c >home status not conducive to medical needs  MEDICATIONS  (STANDING):  ALBUTerol/ipratropium for Nebulization 3 milliLiter(s) Nebulizer every 6 hours  aspirin enteric coated 81 milliGRAM(s) Oral daily  atorvastatin 40 milliGRAM(s) Oral at bedtime  clopidogrel Tablet 75 milliGRAM(s) Oral daily  dextrose 5%. 1000 milliLiter(s) (50 mL/Hr) IV Continuous <Continuous>  dextrose 50% Injectable 12.5 Gram(s) IV Push once  dextrose 50% Injectable 25 Gram(s) IV Push once  dextrose 50% Injectable 25 Gram(s) IV Push once  enoxaparin Injectable 40 milliGRAM(s) SubCutaneous daily  insulin glargine Injectable (LANTUS) 60 Unit(s) SubCutaneous at bedtime  insulin lispro (HumaLOG) corrective regimen sliding scale   SubCutaneous Before meals and at bedtime  insulin lispro Injectable (HumaLOG) 12 Unit(s) SubCutaneous three times a day before meals  lisinopril 2.5 milliGRAM(s) Oral daily  nicotine - 21 mG/24Hr(s) Patch 1 patch Transdermal daily  nystatin    Suspension 121858 Unit(s) Oral four times a day  pantoprazole    Tablet 40 milliGRAM(s) Oral before breakfast  saccharomyces boulardii 250 milliGRAM(s) Oral two times a day  spironolactone 12.5 milliGRAM(s) Oral daily      MEDICATIONS  (PRN):  acetaminophen   Tablet .. 650 milliGRAM(s) Oral every 6 hours PRN Temp greater or equal to 38C (100.4F), Moderate Pain (4 - 6)  ALBUTerol    0.083% 2.5 milliGRAM(s) Nebulizer every 4 hours PRN Shortness of Breath and/or Wheezing  dextrose 40% Gel 15 Gram(s) Oral once PRN Blood Glucose LESS THAN 70 milliGRAM(s)/deciliter  glucagon  Injectable 1 milliGRAM(s) IntraMuscular once PRN Glucose LESS THAN 70 milligrams/deciliter      Allergies    No Known Allergies    Intolerances        PAST MEDICAL & SURGICAL HISTORY:  Oxygen dependent  Gastroesophageal reflux disease, esophagitis presence not specified  Smoker  Bipolar 1 disorder  Coronary artery disease involving native coronary artery of native heart without angina pectoris  Type 2 diabetes mellitus without complication, with long-term current use of insulin  Pulmonary HTN  HLD (hyperlipidemia)  Stented coronary artery  COPD (chronic obstructive pulmonary disease)  No significant past surgical history      SOCIAL HISTORY  Smoking History: Active      REVIEW OF SYSTEMS:    CONSTITUTIONAL:  No distress    HEENT:  Eyes:  No diplopia or blurred vision. ENT:  No earache, sore throat or runny nose.    CARDIOVASCULAR:  No pressure, squeezing, tightness, heaviness or aching about the chest; no palpitations.    RESPIRATORY: Some cough    GASTROINTESTINAL:  No nausea, vomiting or diarrhea.    GENITOURINARY:  No dysuria, frequency or urgency.    MUSCULOSKELETAL:  No joint pain    SKIN:  No new lesions.    NEUROLOGIC:  No paresthesias, fasciculations, seizures or weakness.    PSYCHIATRIC:  Poor insight    ENDOCRINE:  No heat or cold intolerance, polyuria or polydipsia.    HEMATOLOGICAL:  No easy bruising or bleeding.     Vital Signs Last 24 Hrs  T(C): 36.6 (12 Oct 2018 11:29), Max: 37.1 (11 Oct 2018 21:51)  T(F): 97.9 (12 Oct 2018 11:29), Max: 98.7 (11 Oct 2018 21:51)  HR: 87 (12 Oct 2018 16:07) (64 - 87)  BP: 108/73 (12 Oct 2018 11:29) (90/54 - 112/67)  BP(mean): --  RR: 18 (12 Oct 2018 11:29) (18 - 18)  SpO2: 92% (12 Oct 2018 16:07) (91% - 98%)    PHYSICAL EXAMINATION:    GENERAL: The patient is awake and alert in no apparent distress.     HEENT: Head is normocephalic and atraumatic. Extraocular muscles are intact. Mucous membranes are moist.    NECK: Supple.    LUNGS: Clear to auscultation without wheezing, rales or rhonchi; respirations unlabored    HEART: Regular rate and rhythm without murmur.    ABDOMEN: Soft, nontender, and nondistended.      EXTREMITIES: Without any cyanosis, clubbing, rash, lesions or edema.    NEUROLOGIC: Grossly intact.    SKIN: No ulceration or induration present.      LABS:                        14.5   12.6  )-----------( 163      ( 12 Oct 2018 08:55 )             46.4     10-12    135  |  91<L>  |  20.0  ----------------------------<  315<H>  4.5   |  35.0<H>  |  0.47<L>    Ca    8.8      12 Oct 2018 08:55                          MICROBIOLOGY:    RADIOLOGY & ADDITIONAL STUDIES:

## 2018-10-12 NOTE — PROGRESS NOTE ADULT - SUBJECTIVE AND OBJECTIVE BOX
CARDIOLOGY PROGRESS NOTE   ([facility])                                                                                                        Subjective: wants to go home, On NC O2, denied CP, dyspnea    Vitals:  T(C): 36.6 (10-12-18 @ 04:00), Max: 37.1 (10-11-18 @ 21:51)  HR: 81 (10-12-18 @ 04:00) (64 - 87)  BP: 112/67 (10-12-18 @ 04:00) (90/54 - 112/67)  RR: 18 (10-12-18 @ 04:00) (18 - 18)  SpO2: 95% (10-12-18 @ 03:41) (94% - 98%)  Wt(kg): --  I&O's Summary    11 Oct 2018 07:01  -  12 Oct 2018 07:00  --------------------------------------------------------  IN: 0 mL / OUT: 520 mL / NET: -520 mL        Weight (kg): 88.2 (10-12 @ 04:00)    PHYSICAL EXAM:  Appearance: Normal	  HEENT:   Atraumatic  Cardiovascular: Normal S1 S2, No JVD,   Respiratory: Lungs clear to auscultation	  Gastrointestinal:  Soft, Non-tender, + BS	  Skin: No rashes, No ecchymoses, No cyanosis  Neurologic: Alert and awake, able to move extremities  Extremities: No edema    TELEMETRY: 	SR, PVCs, 2.28sec pause at 03:57 hours        CURRENT MEDICATIONS:  lisinopril 2.5 milliGRAM(s) Oral daily  metoprolol tartrate 12.5 milliGRAM(s) Oral two times a day  spironolactone 12.5 milliGRAM(s) Oral daily    nystatin    Suspension 161800 Unit(s) Oral four times a day    ALBUTerol    0.083% 2.5 milliGRAM(s) Nebulizer every 4 hours PRN  ALBUTerol/ipratropium for Nebulization 3 milliLiter(s) Nebulizer every 6 hours    acetaminophen   Tablet .. 650 milliGRAM(s) Oral every 6 hours PRN    pantoprazole    Tablet 40 milliGRAM(s) Oral before breakfast    atorvastatin 40 milliGRAM(s) Oral at bedtime  atorvastatin 10 milliGRAM(s) Oral at bedtime  dextrose 40% Gel 15 Gram(s) Oral once PRN  dextrose 50% Injectable 12.5 Gram(s) IV Push once  dextrose 50% Injectable 25 Gram(s) IV Push once  dextrose 50% Injectable 25 Gram(s) IV Push once  glucagon  Injectable 1 milliGRAM(s) IntraMuscular once PRN  insulin glargine Injectable (LANTUS) 50 Unit(s) SubCutaneous at bedtime  insulin lispro (HumaLOG) corrective regimen sliding scale   SubCutaneous Before meals and at bedtime  insulin lispro Injectable (HumaLOG) 10 Unit(s) SubCutaneous three times a day before meals  predniSONE   Tablet 60 milliGRAM(s) Oral daily    aspirin enteric coated 81 milliGRAM(s) Oral daily  clopidogrel Tablet 75 milliGRAM(s) Oral daily  dextrose 5%. 1000 milliLiter(s) IV Continuous <Continuous>  enoxaparin Injectable 40 milliGRAM(s) SubCutaneous daily      LABS:	 	                          14.2   10.4  )-----------( 147      ( 11 Oct 2018 07:17 )             45.5     10-11    138  |  93<L>  |  22.0<H>  ----------------------------<  256<H>  4.2   |  40.0<H>  |  0.58    Ca    9.0      11 Oct 2018 07:17

## 2018-10-12 NOTE — PROGRESS NOTE ADULT - ASSESSMENT
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 Found to have a positive rhinovirus on viral panel. Admitted for COPD exacerbation / viral pneumonia. Responded to IV steroids, nebulizers and clinically improving. Evaluated by cardiology/ pulmonology and recommendation appreciated.     Plan:    > Acute on chronic hypercapnic and hypoxic respiratory failure - Multifactorial (COPD Exacerbation, Pulmonary HTN, Viral Pneumonia and Acute Diastolic Heart Failure) - RVP is positive for Rhinovirus. Continue prednisone taper, nebulizers, O2 supplements (Home o2 dependent encourage OOB. Smoking cessation emphasized.      > Smoker - Nicotine Patch. Smoking cessation counseled.   > Acute on chronic Diastolic CHF - Cardiology on board, C/w aldactone, Lisinopril. metoprolol d/ilir due to pause by cardiology.   > COPD/ PHTN- Plan as above, Prednisone taper, Duoneb Pulmonology on follow up.   > CAD and HLD - Continue Aspirin, Plavix, Lipitor. Metoprolol d/ilir by cardio as above.   > DMII- BG elevated, Increase Lantus 50-->60, Premeal insulin 10-->12, LSS + FS monitoring.   > DVT Prophylaxis -- Lovenox 40 mg  > Dispo- PT f/u requested. Possible d/c in 24-48 hours if O2 requirement improves. 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 Found to have a positive rhinovirus on viral panel. Admitted for COPD exacerbation / viral pneumonia. Responded to IV steroids, nebulizers and clinically improving. Evaluated by cardiology/ pulmonology and recommendation appreciated.     Plan:    > Acute on chronic hypercapnic and hypoxic respiratory failure - Multifactorial (COPD Exacerbation, Pulmonary HTN, Viral Pneumonia and Acute Diastolic Heart Failure) - RVP is positive for Rhinovirus. Continue prednisone taper, nebulizers, O2 supplements (Home o2 dependent encourage OOB. Smoking cessation emphasized.      > Smoker - Nicotine Patch. Smoking cessation counseled.   > Acute on chronic Diastolic CHF - Cardiology on board, C/w aldactone, Lisinopril. metoprolol d/ilir due to pause by cardiology.   > COPD/ PHTN- Plan as above, Prednisone taper, Duoneb Pulmonology on follow up.   > CAD and HLD - Continue Aspirin, Plavix, Lipitor. Metoprolol d/ilir by cardio as above.   > DMII- BG elevated, Increase Lantus 50-->60, Premeal insulin 10-->12, LSS + FS monitoring.   > DVT Prophylaxis -- Lovenox 40 mg  > Dispo- PT f/u requested likely [facility]. Pt still requiring 5L Oxygen.

## 2018-10-12 NOTE — PROGRESS NOTE ADULT - ASSESSMENT
57 yo M with pulmonary HTN, GERD, Bipolar disorder, HTN, HLD, daily tobacco smoker T2DM on insulin, CAD s/p PCI in 2016 (RCA?) by Dr. 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 RV severely enlarged. RV function reduced. RA severely enlarged Trace MR. Mod TR. PASP 56mmHg.  COPD; underlying stage 4 dz  pHTN  Acute on chronic hypercapnic and hypoxic respiratory failure  Rhinovirus infection  Smoker        P/  Transitioned to NC O2, tolerating  Comfortable   Telemetry showed 2.28sec pause at 03:57 hrs, asymptomatic  Metoprolol d/c'ed

## 2018-10-12 NOTE — BEHAVIORAL HEALTH ASSESSMENT NOTE - RISK ASSESSMENT
low - pt has acute/chronic medical issue, low frustration, untreated mood disorder, but no si, past hospitalizations, or substance abuse, has family support and willing for treatment

## 2018-10-13 LAB
GLUCOSE BLDC GLUCOMTR-MCNC: 221 MG/DL — HIGH (ref 70–99)
GLUCOSE BLDC GLUCOMTR-MCNC: 248 MG/DL — HIGH (ref 70–99)
GLUCOSE BLDC GLUCOMTR-MCNC: 264 MG/DL — HIGH (ref 70–99)
GLUCOSE BLDC GLUCOMTR-MCNC: 264 MG/DL — HIGH (ref 70–99)

## 2018-10-13 PROCEDURE — 99232 SBSQ HOSP IP/OBS MODERATE 35: CPT

## 2018-10-13 RX ORDER — INSULIN LISPRO 100/ML
14 VIAL (ML) SUBCUTANEOUS
Qty: 0 | Refills: 0 | Status: DISCONTINUED | OUTPATIENT
Start: 2018-10-13 | End: 2018-10-14

## 2018-10-13 RX ORDER — MIRTAZAPINE 45 MG/1
7.5 TABLET, ORALLY DISINTEGRATING ORAL AT BEDTIME
Qty: 0 | Refills: 0 | Status: DISCONTINUED | OUTPATIENT
Start: 2018-10-13 | End: 2018-11-01

## 2018-10-13 RX ORDER — ALPRAZOLAM 0.25 MG
0.5 TABLET ORAL THREE TIMES A DAY
Qty: 0 | Refills: 0 | Status: DISCONTINUED | OUTPATIENT
Start: 2018-10-13 | End: 2018-10-20

## 2018-10-13 RX ADMIN — INSULIN GLARGINE 60 UNIT(S): 100 INJECTION, SOLUTION SUBCUTANEOUS at 21:30

## 2018-10-13 RX ADMIN — Medication 6: at 21:30

## 2018-10-13 RX ADMIN — Medication 12 UNIT(S): at 12:31

## 2018-10-13 RX ADMIN — Medication 12 UNIT(S): at 08:47

## 2018-10-13 RX ADMIN — Medication 500000 UNIT(S): at 12:30

## 2018-10-13 RX ADMIN — Medication 650 MILLIGRAM(S): at 21:31

## 2018-10-13 RX ADMIN — Medication 14 UNIT(S): at 17:55

## 2018-10-13 RX ADMIN — ENOXAPARIN SODIUM 40 MILLIGRAM(S): 100 INJECTION SUBCUTANEOUS at 12:30

## 2018-10-13 RX ADMIN — Medication 500000 UNIT(S): at 17:55

## 2018-10-13 RX ADMIN — Medication 3 MILLILITER(S): at 09:10

## 2018-10-13 RX ADMIN — SPIRONOLACTONE 12.5 MILLIGRAM(S): 25 TABLET, FILM COATED ORAL at 06:11

## 2018-10-13 RX ADMIN — CLOPIDOGREL BISULFATE 75 MILLIGRAM(S): 75 TABLET, FILM COATED ORAL at 12:30

## 2018-10-13 RX ADMIN — Medication 81 MILLIGRAM(S): at 12:30

## 2018-10-13 RX ADMIN — Medication 0.5 MILLIGRAM(S): at 16:24

## 2018-10-13 RX ADMIN — Medication 4: at 08:47

## 2018-10-13 RX ADMIN — Medication 0.5 MILLIGRAM(S): at 06:17

## 2018-10-13 RX ADMIN — Medication 50 MILLIGRAM(S): at 06:17

## 2018-10-13 RX ADMIN — Medication 3 MILLILITER(S): at 03:15

## 2018-10-13 RX ADMIN — Medication 4: at 17:54

## 2018-10-13 RX ADMIN — Medication 250 MILLIGRAM(S): at 06:11

## 2018-10-13 RX ADMIN — PANTOPRAZOLE SODIUM 40 MILLIGRAM(S): 20 TABLET, DELAYED RELEASE ORAL at 06:10

## 2018-10-13 RX ADMIN — LISINOPRIL 2.5 MILLIGRAM(S): 2.5 TABLET ORAL at 06:10

## 2018-10-13 RX ADMIN — Medication 3 MILLILITER(S): at 20:24

## 2018-10-13 RX ADMIN — Medication 6: at 12:31

## 2018-10-13 RX ADMIN — Medication 1 PATCH: at 12:31

## 2018-10-13 RX ADMIN — Medication 1 PATCH: at 12:29

## 2018-10-13 RX ADMIN — Medication 650 MILLIGRAM(S): at 22:31

## 2018-10-13 RX ADMIN — Medication 250 MILLIGRAM(S): at 17:55

## 2018-10-13 RX ADMIN — ATORVASTATIN CALCIUM 40 MILLIGRAM(S): 80 TABLET, FILM COATED ORAL at 21:30

## 2018-10-13 NOTE — PROGRESS NOTE ADULT - SUBJECTIVE AND OBJECTIVE BOX
DEVIKA CARBALLO Male 58y [id number]    Patient is a 58y old  Male who presents with a chief complaint of shortness of breath (12 Oct 2018 16:12)      Subjective/objective:  Pt seen/ examined at bedside and charts reviewed, no over night event reported by night staff. Pt wants to go home, state my breathing better, no CP or other complaints.  On O2NC 5L, at home on 2L.      Review of system:  No fever, chills, nausea, vomiting, headache, dizziness, chest pain, SOB or palpitation.      PHYSICAL EXAM:    Vital Signs Last 24 Hrs  T(C): 37 (13 Oct 2018 09:24), Max: 37 (13 Oct 2018 09:24)  T(F): 98.6 (13 Oct 2018 09:24), Max: 98.6 (13 Oct 2018 09:24)  HR: 100 (13 Oct 2018 09:24) (78 - 100)  BP: 94/50 (13 Oct 2018 09:24) (90/66 - 117/68)  BP(mean): --  RR: 18 (13 Oct 2018 09:24) (17 - 18)  SpO2: 97% (13 Oct 2018 09:24) (92% - 97%)    GENERAL: Pt lying comfortably, NAD.  CHEST/LUNG: Clear to auscultation bilaterally; No wheezing.  HEART: S1S2+, Regular rate and rhythm; No murmurs.  ABDOMEN: Soft, Nontender, Nondistended; Bowel sounds present.  Extremities: No LE edema, pulses+  NEURO: AAOX3, no focal deficits, no motor r sensory loss.  PSYCH: normal mood.      MEDICATIONS  (STANDING):  ALBUTerol/ipratropium for Nebulization 3 milliLiter(s) Nebulizer every 6 hours  aspirin enteric coated 81 milliGRAM(s) Oral daily  atorvastatin 40 milliGRAM(s) Oral at bedtime  clonazePAM Tablet 0.5 milliGRAM(s) Oral three times a day  clopidogrel Tablet 75 milliGRAM(s) Oral daily  dextrose 5%. 1000 milliLiter(s) (50 mL/Hr) IV Continuous <Continuous>  dextrose 50% Injectable 12.5 Gram(s) IV Push once  dextrose 50% Injectable 25 Gram(s) IV Push once  dextrose 50% Injectable 25 Gram(s) IV Push once  enoxaparin Injectable 40 milliGRAM(s) SubCutaneous daily  insulin glargine Injectable (LANTUS) 60 Unit(s) SubCutaneous at bedtime  insulin lispro (HumaLOG) corrective regimen sliding scale   SubCutaneous Before meals and at bedtime  insulin lispro Injectable (HumaLOG) 14 Unit(s) SubCutaneous three times a day before meals  lisinopril 2.5 milliGRAM(s) Oral daily  nicotine - 21 mG/24Hr(s) Patch 1 patch Transdermal daily  nystatin    Suspension 426611 Unit(s) Oral four times a day  pantoprazole    Tablet 40 milliGRAM(s) Oral before breakfast  predniSONE   Tablet 50 milliGRAM(s) Oral daily  saccharomyces boulardii 250 milliGRAM(s) Oral two times a day  spironolactone 12.5 milliGRAM(s) Oral daily    MEDICATIONS  (PRN):  acetaminophen   Tablet .. 650 milliGRAM(s) Oral every 6 hours PRN Temp greater or equal to 38C (100.4F), Moderate Pain (4 - 6)  ALBUTerol    0.083% 2.5 milliGRAM(s) Nebulizer every 4 hours PRN Shortness of Breath and/or Wheezing  dextrose 40% Gel 15 Gram(s) Oral once PRN Blood Glucose LESS THAN 70 milliGRAM(s)/deciliter  glucagon  Injectable 1 milliGRAM(s) IntraMuscular once PRN Glucose LESS THAN 70 milligrams/deciliter        Labs:  LABS:                        14.5   12.6  )-----------( 163      ( 12 Oct 2018 08:55 )             46.4     10-12    135  |  91<L>  |  20.0  ----------------------------<  315<H>  4.5   |  35.0<H>  |  0.47<L>    Ca    8.8      12 Oct 2018 08:55
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
## 2018-10-13 NOTE — PROGRESS NOTE BEHAVIORAL HEALTH - NSBHCHARTREVIEWLAB_PSY_A_CORE FT
CBC Full  -  ( 12 Oct 2018 08:55 )  WBC Count : 12.6 K/uL  Hemoglobin : 14.5 g/dL  Hematocrit : 46.4 %  Platelet Count - Automated : 163 K/uL  Mean Cell Volume : 92.1 fl  Mean Cell Hemoglobin : 28.8 pg  Mean Cell Hemoglobin Concentration : 31.3 g/dL  Auto Neutrophil # : x  Auto Lymphocyte # : x  Auto Monocyte # : x  Auto Eosinophil # : x  Auto Basophil # : x  Auto Neutrophil % : x  Auto Lymphocyte % : x  Auto Monocyte % : x  Auto Eosinophil % : x  Auto Basophil % : x  10-12    135  |  91<L>  |  20.0  ----------------------------<  315<H>  4.5   |  35.0<H>  |  0.47<L>    Ca    8.8      12 Oct 2018 08:55
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
## 2018-10-13 NOTE — PROGRESS NOTE ADULT - ASSESSMENT
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 Found to have a positive rhinovirus on viral panel. Admitted for COPD exacerbation / viral pneumonia. Responded to IV steroids, nebulizers and clinically improving. Evaluated by cardiology/ pulmonology and recommendation appreciated.     Plan:    > Acute on chronic hypercapnic and hypoxic respiratory failure - Multifactorial (COPD Exacerbation, Pulmonary HTN, Viral Pneumonia and Acute Diastolic Heart Failure) - RVP is positive for Rhinovirus. Continue prednisone taper, nebulizers, O2 supplements (Home o2 dependent) encourage OOB. Smoking cessation emphasized. Pt requiring 5L O2. Per SW/ CCM Pt renting room and his landlord will not allow him on oxygen tank given active smoking.      > Smoker - Nicotine Patch. Smoking cessation counseled.   > Acute on chronic Diastolic CHF - C/w aldactone, Lisinopril. Metoprolol d/ilir due to pause by cardiology.   > COPD/ PHTN- Plan as above, Prednisone taper, Duoneb. Pulmonology following.    > CAD and HLD - Continue Aspirin, Plavix, Lipitor. Metoprolol d/ilir by cardio as above.   > DMII- BG better today but still elevated, C/w Lantus 60, Premeal insulin 12-->14, LSS + FS monitoring.   >Anxiety / mood disorder- Seen by Psych, started on Klonopin.   > DVT Prophylaxis -- Lovenox 40 mg  > Dispo- PT f/u requested. Medically stable. Dispo is an issue. Pt wants to go home however per SW/ CCM Pt's landlord won't accept him in house with Oxygen tank given active smoking. SW following for safe disposition.

## 2018-10-13 NOTE — PROGRESS NOTE BEHAVIORAL HEALTH - NSBHCHARTREVIEWVS_PSY_A_CORE FT
Vital Signs Last 24 Hrs  T(C): 37 (13 Oct 2018 09:24), Max: 37 (13 Oct 2018 09:24)  T(F): 98.6 (13 Oct 2018 09:24), Max: 98.6 (13 Oct 2018 09:24)  HR: 100 (13 Oct 2018 09:24) (78 - 100)  BP: 94/50 (13 Oct 2018 09:24) (90/66 - 117/68)  BP(mean): --  RR: 18 (13 Oct 2018 09:24) (17 - 18)  SpO2: 97% (13 Oct 2018 09:24) (92% - 97%)

## 2018-10-14 LAB
GLUCOSE BLDC GLUCOMTR-MCNC: 179 MG/DL — HIGH (ref 70–99)
GLUCOSE BLDC GLUCOMTR-MCNC: 182 MG/DL — HIGH (ref 70–99)
GLUCOSE BLDC GLUCOMTR-MCNC: 276 MG/DL — HIGH (ref 70–99)
GLUCOSE BLDC GLUCOMTR-MCNC: 342 MG/DL — HIGH (ref 70–99)

## 2018-10-14 PROCEDURE — 99232 SBSQ HOSP IP/OBS MODERATE 35: CPT

## 2018-10-14 RX ORDER — INSULIN LISPRO 100/ML
15 VIAL (ML) SUBCUTANEOUS
Qty: 0 | Refills: 0 | Status: DISCONTINUED | OUTPATIENT
Start: 2018-10-14 | End: 2018-10-18

## 2018-10-14 RX ORDER — INSULIN GLARGINE 100 [IU]/ML
65 INJECTION, SOLUTION SUBCUTANEOUS AT BEDTIME
Qty: 0 | Refills: 0 | Status: DISCONTINUED | OUTPATIENT
Start: 2018-10-14 | End: 2018-10-18

## 2018-10-14 RX ADMIN — Medication 500000 UNIT(S): at 05:40

## 2018-10-14 RX ADMIN — SPIRONOLACTONE 12.5 MILLIGRAM(S): 25 TABLET, FILM COATED ORAL at 05:37

## 2018-10-14 RX ADMIN — Medication 250 MILLIGRAM(S): at 17:19

## 2018-10-14 RX ADMIN — Medication 2: at 08:26

## 2018-10-14 RX ADMIN — ATORVASTATIN CALCIUM 40 MILLIGRAM(S): 80 TABLET, FILM COATED ORAL at 21:38

## 2018-10-14 RX ADMIN — Medication 8: at 17:19

## 2018-10-14 RX ADMIN — Medication 3 MILLILITER(S): at 03:41

## 2018-10-14 RX ADMIN — PANTOPRAZOLE SODIUM 40 MILLIGRAM(S): 20 TABLET, DELAYED RELEASE ORAL at 05:37

## 2018-10-14 RX ADMIN — Medication 650 MILLIGRAM(S): at 23:45

## 2018-10-14 RX ADMIN — Medication 2: at 21:38

## 2018-10-14 RX ADMIN — Medication 3 MILLILITER(S): at 16:08

## 2018-10-14 RX ADMIN — LISINOPRIL 2.5 MILLIGRAM(S): 2.5 TABLET ORAL at 05:37

## 2018-10-14 RX ADMIN — Medication 500000 UNIT(S): at 23:44

## 2018-10-14 RX ADMIN — INSULIN GLARGINE 65 UNIT(S): 100 INJECTION, SOLUTION SUBCUTANEOUS at 21:39

## 2018-10-14 RX ADMIN — Medication 15 UNIT(S): at 17:19

## 2018-10-14 RX ADMIN — Medication 1 PATCH: at 12:37

## 2018-10-14 RX ADMIN — Medication 500000 UNIT(S): at 12:37

## 2018-10-14 RX ADMIN — Medication 3 MILLILITER(S): at 21:12

## 2018-10-14 RX ADMIN — Medication 14 UNIT(S): at 08:26

## 2018-10-14 RX ADMIN — Medication 0.5 MILLIGRAM(S): at 23:44

## 2018-10-14 RX ADMIN — Medication 6: at 12:37

## 2018-10-14 RX ADMIN — Medication 650 MILLIGRAM(S): at 17:18

## 2018-10-14 RX ADMIN — CLOPIDOGREL BISULFATE 75 MILLIGRAM(S): 75 TABLET, FILM COATED ORAL at 12:38

## 2018-10-14 RX ADMIN — MIRTAZAPINE 7.5 MILLIGRAM(S): 45 TABLET, ORALLY DISINTEGRATING ORAL at 01:05

## 2018-10-14 RX ADMIN — ENOXAPARIN SODIUM 40 MILLIGRAM(S): 100 INJECTION SUBCUTANEOUS at 12:38

## 2018-10-14 RX ADMIN — Medication 650 MILLIGRAM(S): at 18:20

## 2018-10-14 RX ADMIN — Medication 250 MILLIGRAM(S): at 05:37

## 2018-10-14 RX ADMIN — Medication 50 MILLIGRAM(S): at 05:37

## 2018-10-14 RX ADMIN — Medication 81 MILLIGRAM(S): at 12:37

## 2018-10-14 RX ADMIN — Medication 1 PATCH: at 12:38

## 2018-10-14 RX ADMIN — Medication 500000 UNIT(S): at 17:19

## 2018-10-14 RX ADMIN — Medication 500000 UNIT(S): at 00:34

## 2018-10-14 RX ADMIN — Medication 3 MILLILITER(S): at 09:41

## 2018-10-14 NOTE — PROGRESS NOTE ADULT - SUBJECTIVE AND OBJECTIVE BOX
HARIS DEVIKA Male 58y [id number]    Patient is a 58y old  Male who presents with a chief complaint of shortness of breath (13 Oct 2018 15:32)      Subjective/objective:  Pt seen and examined at bedside, no over night event reported by night staff. Pt resting comfortably, he offers no complaints- wants to go home. On O2 5-->2L today, saturating fine on 2L now.      Review of system:  No fever, chills, nausea, vomiting, headache, dizziness, chest pain, SOB or palpitation.      PHYSICAL EXAM:    Vital Signs Last 24 Hrs  T(C): 36.4 (14 Oct 2018 04:12), Max: 36.7 (13 Oct 2018 21:28)  T(F): 97.5 (14 Oct 2018 04:12), Max: 98 (13 Oct 2018 21:28)  HR: 90 (14 Oct 2018 04:12) (89 - 95)  BP: 102/74 (14 Oct 2018 05:36) (93/65 - 102/74)  BP(mean): --  RR: 18 (14 Oct 2018 04:12) (18 - 18)  SpO2: 95% (14 Oct 2018 04:13) (94% - 96%)    GENERAL: Pt lying comfortably, NAD.  CHEST/LUNG: Clear to auscultation bilaterally; No wheezing.  HEART: S1S2+, Regular rate and rhythm; No murmurs.  ABDOMEN: Soft, Nontender, Nondistended; Bowel sounds present.  Extremities: No LE edema, pulses+  NEURO: AAOX3, no focal deficits, no motor r sensory loss.  PSYCH: normal mood.      MEDICATIONS  (STANDING):  ALBUTerol/ipratropium for Nebulization 3 milliLiter(s) Nebulizer every 6 hours  aspirin enteric coated 81 milliGRAM(s) Oral daily  atorvastatin 40 milliGRAM(s) Oral at bedtime  clopidogrel Tablet 75 milliGRAM(s) Oral daily  dextrose 5%. 1000 milliLiter(s) (50 mL/Hr) IV Continuous <Continuous>  dextrose 50% Injectable 12.5 Gram(s) IV Push once  dextrose 50% Injectable 25 Gram(s) IV Push once  dextrose 50% Injectable 25 Gram(s) IV Push once  enoxaparin Injectable 40 milliGRAM(s) SubCutaneous daily  insulin glargine Injectable (LANTUS) 60 Unit(s) SubCutaneous at bedtime  insulin lispro (HumaLOG) corrective regimen sliding scale   SubCutaneous Before meals and at bedtime  insulin lispro Injectable (HumaLOG) 14 Unit(s) SubCutaneous three times a day before meals  lisinopril 2.5 milliGRAM(s) Oral daily  nicotine - 21 mG/24Hr(s) Patch 1 patch Transdermal daily  nystatin    Suspension [id number] Unit(s) Oral four times a day  pantoprazole    Tablet 40 milliGRAM(s) Oral before breakfast  predniSONE   Tablet 50 milliGRAM(s) Oral daily  saccharomyces boulardii 250 milliGRAM(s) Oral two times a day  spironolactone 12.5 milliGRAM(s) Oral daily    MEDICATIONS  (PRN):  acetaminophen   Tablet .. 650 milliGRAM(s) Oral every 6 hours PRN Temp greater or equal to 38C (100.4F), Moderate Pain (4 - 6)  ALBUTerol    0.083% 2.5 milliGRAM(s) Nebulizer every 4 hours PRN Shortness of Breath and/or Wheezing  ALPRAZolam 0.5 milliGRAM(s) Oral three times a day PRN Anxiety  dextrose 40% Gel 15 Gram(s) Oral once PRN Blood Glucose LESS THAN 70 milliGRAM(s)/deciliter  glucagon  Injectable 1 milliGRAM(s) IntraMuscular once PRN Glucose LESS THAN 70 milligrams/deciliter  mirtazapine 7.5 milliGRAM(s) Oral at bedtime PRN Sleep        Labs:  LABS:

## 2018-10-14 NOTE — PROGRESS NOTE ADULT - ASSESSMENT
Pt is 59 yo male with PMHx of pulmonary HTN, GERD, Bipolar disorder, HTN, HLD, current everyday 0.5 PPD smoker, DM-II on insulin, severe COPD/emphysema on home oxygen 2L, CHF with last exacerbation in 2015 with CAD s/p PCI in [place] in ? 2015/2016, who presented with complaints of worsening shortness of breath and congestion. Found to have a positive rhinovirus on viral panel. Admitted for COPD exacerbation / viral pneumonia. Responded to IV steroids, nebulizers and clinically improved. Evaluated by cardiology/ pulmonology and recommendation appreciated.     Plan:    > Acute on chronic hypercapnic and hypoxic respiratory failure - Multifactorial (COPD Exacerbation, Pulmonary HTN, Viral Pneumonia and Acute Diastolic Heart Failure) - RVP is positive for Rhinovirus. Continue prednisone taper, nebulizers, O2 supplements (Home o2 dependent) encourage OOB. Smoking cessation emphasized. O2 down from 5-->2L today. Per SW/ CCM Pt renting room and his landlord will not allow him on oxygen tank given active smoking- however Pt wants to go home.      > Smoker - Nicotine Patch. Smoking cessation counseled.   > Acute on chronic Diastolic CHF - C/w aldactone, Lisinopril. Metoprolol d/ilir due to pause by cardiology.   > COPD/ PHTN- Plan as above, Prednisone taper, Duoneb. Pulmonology following.    > CAD and HLD - Continue Aspirin, Plavix, Lipitor. Metoprolol d/ilir by cardio as above.   > DMII- BG still elevated- partly form steroids. Lantus 60-->65, Premeal insulin 15, , LSS + FS monitoring. Prednisone being tapered.   >Anxiety / mood / Sleep disorder- Seen by Psych, started on Xanax/ Remeron.    > DVT Prophylaxis -- Lovenox 40 mg  > Dispo- PT f/u requested. Medically stable. Dispo is an issue. Pt wants to go home however per SW/ CCM Pt's landlord won't accept him in house with Oxygen tank given active smoking. SW following for safe disposition.

## 2018-10-15 LAB
GLUCOSE BLDC GLUCOMTR-MCNC: 194 MG/DL — HIGH (ref 70–99)
GLUCOSE BLDC GLUCOMTR-MCNC: 200 MG/DL — HIGH (ref 70–99)
GLUCOSE BLDC GLUCOMTR-MCNC: 259 MG/DL — HIGH (ref 70–99)
GLUCOSE BLDC GLUCOMTR-MCNC: 267 MG/DL — HIGH (ref 70–99)

## 2018-10-15 PROCEDURE — 99232 SBSQ HOSP IP/OBS MODERATE 35: CPT

## 2018-10-15 RX ADMIN — Medication 650 MILLIGRAM(S): at 00:50

## 2018-10-15 RX ADMIN — Medication 15 UNIT(S): at 08:30

## 2018-10-15 RX ADMIN — INSULIN GLARGINE 65 UNIT(S): 100 INJECTION, SOLUTION SUBCUTANEOUS at 21:36

## 2018-10-15 RX ADMIN — Medication 3 MILLILITER(S): at 15:22

## 2018-10-15 RX ADMIN — Medication 650 MILLIGRAM(S): at 12:17

## 2018-10-15 RX ADMIN — Medication 0.5 MILLIGRAM(S): at 21:56

## 2018-10-15 RX ADMIN — ENOXAPARIN SODIUM 40 MILLIGRAM(S): 100 INJECTION SUBCUTANEOUS at 11:14

## 2018-10-15 RX ADMIN — Medication 6: at 17:32

## 2018-10-15 RX ADMIN — Medication 3 MILLILITER(S): at 21:08

## 2018-10-15 RX ADMIN — Medication 250 MILLIGRAM(S): at 17:31

## 2018-10-15 RX ADMIN — ATORVASTATIN CALCIUM 40 MILLIGRAM(S): 80 TABLET, FILM COATED ORAL at 21:36

## 2018-10-15 RX ADMIN — Medication 500000 UNIT(S): at 17:31

## 2018-10-15 RX ADMIN — Medication 1 PATCH: at 11:14

## 2018-10-15 RX ADMIN — Medication 15 UNIT(S): at 17:32

## 2018-10-15 RX ADMIN — SPIRONOLACTONE 12.5 MILLIGRAM(S): 25 TABLET, FILM COATED ORAL at 06:12

## 2018-10-15 RX ADMIN — Medication 3 MILLILITER(S): at 08:45

## 2018-10-15 RX ADMIN — PANTOPRAZOLE SODIUM 40 MILLIGRAM(S): 20 TABLET, DELAYED RELEASE ORAL at 06:11

## 2018-10-15 RX ADMIN — Medication 500000 UNIT(S): at 23:39

## 2018-10-15 RX ADMIN — Medication 2: at 08:29

## 2018-10-15 RX ADMIN — Medication 81 MILLIGRAM(S): at 11:11

## 2018-10-15 RX ADMIN — Medication 15 UNIT(S): at 12:15

## 2018-10-15 RX ADMIN — Medication 2: at 21:36

## 2018-10-15 RX ADMIN — Medication 3 MILLILITER(S): at 04:06

## 2018-10-15 RX ADMIN — Medication 40 MILLIGRAM(S): at 06:11

## 2018-10-15 RX ADMIN — Medication 650 MILLIGRAM(S): at 11:14

## 2018-10-15 RX ADMIN — Medication 250 MILLIGRAM(S): at 06:11

## 2018-10-15 RX ADMIN — Medication 500000 UNIT(S): at 11:13

## 2018-10-15 RX ADMIN — Medication 6: at 12:15

## 2018-10-15 RX ADMIN — Medication 650 MILLIGRAM(S): at 17:33

## 2018-10-15 RX ADMIN — Medication 500000 UNIT(S): at 06:13

## 2018-10-15 RX ADMIN — Medication 650 MILLIGRAM(S): at 18:21

## 2018-10-15 RX ADMIN — Medication 650 MILLIGRAM(S): at 23:39

## 2018-10-15 RX ADMIN — CLOPIDOGREL BISULFATE 75 MILLIGRAM(S): 75 TABLET, FILM COATED ORAL at 11:11

## 2018-10-15 NOTE — PROGRESS NOTE ADULT - SUBJECTIVE AND OBJECTIVE BOX
DEVIKA CARBALLO Male 58y [id number]    Patient is a 58y old  Male who presents with a chief complaint of shortness of breath (14 Oct 2018 12:03)      Subjective/objective:  Pt seen and examined at bedside, no over night event reported by night staff. Pt is not happy being here and wants to go home. He offers no complaints. On O2NC 2L.     Review of system:  No fever, chills, nausea, vomiting, headache, dizziness, chest pain, SOB or palpitation.      PHYSICAL EXAM:    Vital Signs Last 24 Hrs  T(C): 36.4 (15 Oct 2018 11:46), Max: 36.8 (15 Oct 2018 04:15)  T(F): 97.5 (15 Oct 2018 11:46), Max: 98.2 (15 Oct 2018 04:15)  HR: 92 (15 Oct 2018 15:23) (85 - 103)  BP: 98/61 (15 Oct 2018 11:46) (84/57 - 98/61)  BP(mean): --  RR: 18 (15 Oct 2018 11:46) (18 - 18)  SpO2: 98% (15 Oct 2018 11:46) (93% - 98%)    GENERAL: Pt lying comfortably, NAD.  CHEST/LUNG: Clear to auscultation bilaterally; No wheezing.  HEART: S1S2+, Regular rate and rhythm; No murmurs.  ABDOMEN: Soft, Nontender, Nondistended; Bowel sounds present.  Extremities: No LE edema, pulses+  NEURO: AAOX3, no focal deficits, no motor r sensory loss.  PSYCH: normal mood.      MEDICATIONS  (STANDING):  ALBUTerol/ipratropium for Nebulization 3 milliLiter(s) Nebulizer every 6 hours  aspirin enteric coated 81 milliGRAM(s) Oral daily  atorvastatin 40 milliGRAM(s) Oral at bedtime  clopidogrel Tablet 75 milliGRAM(s) Oral daily  dextrose 5%. 1000 milliLiter(s) (50 mL/Hr) IV Continuous <Continuous>  dextrose 50% Injectable 12.5 Gram(s) IV Push once  dextrose 50% Injectable 25 Gram(s) IV Push once  dextrose 50% Injectable 25 Gram(s) IV Push once  enoxaparin Injectable 40 milliGRAM(s) SubCutaneous daily  insulin glargine Injectable (LANTUS) 65 Unit(s) SubCutaneous at bedtime  insulin lispro (HumaLOG) corrective regimen sliding scale   SubCutaneous Before meals and at bedtime  insulin lispro Injectable (HumaLOG) 15 Unit(s) SubCutaneous three times a day before meals  lisinopril 2.5 milliGRAM(s) Oral daily  nicotine - 21 mG/24Hr(s) Patch 1 patch Transdermal daily  nystatin    Suspension 599458 Unit(s) Oral four times a day  pantoprazole    Tablet 40 milliGRAM(s) Oral before breakfast  predniSONE   Tablet 40 milliGRAM(s) Oral daily  saccharomyces boulardii 250 milliGRAM(s) Oral two times a day  spironolactone 12.5 milliGRAM(s) Oral daily    MEDICATIONS  (PRN):  acetaminophen   Tablet .. 650 milliGRAM(s) Oral every 6 hours PRN Temp greater or equal to 38C (100.4F), Moderate Pain (4 - 6)  ALBUTerol    0.083% 2.5 milliGRAM(s) Nebulizer every 4 hours PRN Shortness of Breath and/or Wheezing  ALPRAZolam 0.5 milliGRAM(s) Oral three times a day PRN Anxiety  dextrose 40% Gel 15 Gram(s) Oral once PRN Blood Glucose LESS THAN 70 milliGRAM(s)/deciliter  glucagon  Injectable 1 milliGRAM(s) IntraMuscular once PRN Glucose LESS THAN 70 milligrams/deciliter  mirtazapine 7.5 milliGRAM(s) Oral at bedtime PRN Sleep        Labs:  LABS:

## 2018-10-15 NOTE — PROGRESS NOTE ADULT - ASSESSMENT
Pt is 59 yo male with PMHx of pulmonary HTN, GERD, Bipolar disorder, HTN, HLD, current everyday 0.5 PPD smoker, DM-II on insulin, severe COPD/emphysema on home oxygen 2L, CHF with last exacerbation in 2015 with CAD s/p PCI in [place] in ? 2015/2016, who presented with complaints of worsening shortness of breath and congestion. Found to have a positive rhinovirus on viral panel. Admitted for COPD exacerbation / viral pneumonia. Responded to IV steroids, nebulizers and clinically improved. Evaluated by cardiology/ pulmonology and recommendation appreciated. Pt medically stable and pending TACO placement.    Plan:    > Acute on chronic hypercapnic and hypoxic respiratory failure - Multifactorial (COPD Exacerbation, Pulmonary HTN, and Diastolic Heart Failure) - RVP is positive for Rhinovirus. Continue prednisone taper, nebulizers, O2 supplements (Home o2 dependent) encourage OOB. Smoking cessation emphasized. O2NC 2L now. Per SW/ CCM Pt renting room and his landlord will not allow him on oxygen tank given active smoking- Pt initially refusing for TACO and now agreeable.       > Smoker - Nicotine Patch. Smoking cessation counseled.   > Acute on chronic Diastolic CHF - C/w aldactone, Lisinopril. Metoprolol d/ilir due to pause by cardiology.   > COPD/ PHTN- Plan as above, O2 supplements, Prednisone taper, Duoneb. Pulmonology following.   > CAD and HLD - Continue Aspirin, Plavix, Lipitor. Metoprolol d/ilir by cardio as above.   > DMII- BG better today, C/w Lantus 65, Pre meal insulin 15, LSS + FS monitoring. Prednisone being tapered.   >Anxiety / mood / Sleep disorder- Seen by Psych, started on Xanax/ Remeron.    > DVT Prophylaxis -- Lovenox 40 mg  > Dispo- Medically stable. Dispo is an issue. Pt wants to go home however per SW/ CCM Pt's landlord won't accept him in house with Oxygen tank given active smoking. Pt now agreeable for TACO. SW following.

## 2018-10-16 LAB
GLUCOSE BLDC GLUCOMTR-MCNC: 168 MG/DL — HIGH (ref 70–99)
GLUCOSE BLDC GLUCOMTR-MCNC: 173 MG/DL — HIGH (ref 70–99)
GLUCOSE BLDC GLUCOMTR-MCNC: 183 MG/DL — HIGH (ref 70–99)
GLUCOSE BLDC GLUCOMTR-MCNC: 206 MG/DL — HIGH (ref 70–99)

## 2018-10-16 PROCEDURE — 99232 SBSQ HOSP IP/OBS MODERATE 35: CPT

## 2018-10-16 RX ADMIN — Medication 3 MILLILITER(S): at 09:21

## 2018-10-16 RX ADMIN — Medication 500000 UNIT(S): at 17:03

## 2018-10-16 RX ADMIN — Medication 15 UNIT(S): at 12:13

## 2018-10-16 RX ADMIN — INSULIN GLARGINE 65 UNIT(S): 100 INJECTION, SOLUTION SUBCUTANEOUS at 22:11

## 2018-10-16 RX ADMIN — Medication 250 MILLIGRAM(S): at 05:49

## 2018-10-16 RX ADMIN — LISINOPRIL 2.5 MILLIGRAM(S): 2.5 TABLET ORAL at 05:49

## 2018-10-16 RX ADMIN — Medication 250 MILLIGRAM(S): at 17:03

## 2018-10-16 RX ADMIN — Medication 3 MILLILITER(S): at 04:23

## 2018-10-16 RX ADMIN — Medication 2: at 08:20

## 2018-10-16 RX ADMIN — Medication 650 MILLIGRAM(S): at 17:04

## 2018-10-16 RX ADMIN — Medication 2: at 12:13

## 2018-10-16 RX ADMIN — Medication 15 UNIT(S): at 17:03

## 2018-10-16 RX ADMIN — Medication 1 PATCH: at 12:14

## 2018-10-16 RX ADMIN — SPIRONOLACTONE 12.5 MILLIGRAM(S): 25 TABLET, FILM COATED ORAL at 05:49

## 2018-10-16 RX ADMIN — Medication 15 UNIT(S): at 08:20

## 2018-10-16 RX ADMIN — Medication 650 MILLIGRAM(S): at 00:39

## 2018-10-16 RX ADMIN — CLOPIDOGREL BISULFATE 75 MILLIGRAM(S): 75 TABLET, FILM COATED ORAL at 12:13

## 2018-10-16 RX ADMIN — Medication 81 MILLIGRAM(S): at 12:13

## 2018-10-16 RX ADMIN — ATORVASTATIN CALCIUM 40 MILLIGRAM(S): 80 TABLET, FILM COATED ORAL at 22:12

## 2018-10-16 RX ADMIN — ENOXAPARIN SODIUM 40 MILLIGRAM(S): 100 INJECTION SUBCUTANEOUS at 12:14

## 2018-10-16 RX ADMIN — Medication 650 MILLIGRAM(S): at 18:04

## 2018-10-16 RX ADMIN — Medication 1 PATCH: at 12:18

## 2018-10-16 RX ADMIN — Medication 2: at 22:12

## 2018-10-16 RX ADMIN — Medication 500000 UNIT(S): at 05:50

## 2018-10-16 RX ADMIN — Medication 3 MILLILITER(S): at 20:46

## 2018-10-16 RX ADMIN — Medication 40 MILLIGRAM(S): at 05:49

## 2018-10-16 RX ADMIN — Medication 4: at 17:04

## 2018-10-16 RX ADMIN — Medication 0.5 MILLIGRAM(S): at 22:22

## 2018-10-16 RX ADMIN — PANTOPRAZOLE SODIUM 40 MILLIGRAM(S): 20 TABLET, DELAYED RELEASE ORAL at 05:49

## 2018-10-16 RX ADMIN — Medication 500000 UNIT(S): at 12:14

## 2018-10-16 NOTE — PROGRESS NOTE ADULT - ASSESSMENT
Pt is 59 yo male with PMHx of pulmonary HTN, GERD, Bipolar disorder, HTN, HLD, current everyday 0.5 PPD smoker, DM-II on insulin, severe COPD/emphysema on home oxygen 2L, CHF with last exacerbation in 2015 with CAD s/p PCI in [place] in ? 2015/2016, who presented with complaints of worsening shortness of breath and congestion. Found to have a positive rhinovirus on viral panel. Admitted for COPD exacerbation / viral pneumonia. Responded to IV steroids, nebulizers and clinically improved. Evaluated by cardiology/ pulmonology and recommendation appreciated. Pt medically stable and pending TACO placement.    Plan:    > Acute on chronic hypercapnic and hypoxic respiratory failure - Multifactorial (COPD Exacerbation, Pulmonary HTN, and Diastolic Heart Failure) - RVP is positive for Rhinovirus. Continue prednisone taper, nebulizers, O2 supplements (Home o2 dependent) encourage OOB. Smoking cessation emphasized. O2NC 2L now. Per SW/ CCM Pt renting room and his landlord will not allow him on oxygen tank given active smoking- Pt initially refusing for TACO and now agreeable. Pending level 2 clearance for TACO.       > Smoker - Nicotine Patch. Smoking cessation counseled.   > Acute on chronic Diastolic CHF - C/w aldactone, Lisinopril. Metoprolol d/ilir due to pause by cardiology.   > COPD/ PHTN- Plan as above, O2 supplements, Prednisone taper, Duoneb. Pulmonology following.   > CAD and HLD - Continue Aspirin, Plavix, Lipitor. Metoprolol d/ilir by cardio as above.   > DMII- BG better, C/w Lantus 65, Pre meal insulin 15, LSS + FS monitoring. Prednisone being tapered.   > Anxiety / mood / Sleep disorder- Seen by Psych, started on Xanax/ Remeron.    > DVT Prophylaxis -- Lovenox 40 mg  > Dispo- Medically stable. Pt wants to go home however per SW/ CCM Pt's landlord won't accept him in house with Oxygen tank given active smoking. Pt now agreeable for TACO. Pending level 2 clearance for TACO.

## 2018-10-16 NOTE — CHART NOTE - NSCHARTNOTEFT_GEN_A_CORE
Source: Patient [ x]  Family [ ]   other [ x] EMR    Current Diet: Consistent Carbohydrate/No Snack; DASH/TLC    PO intake:  < 50% [ ]   50-75%  [ x]   %  [ ]  other :      Current Weight:   10/16 - 195#  10/12 - 194.4#  no wt loss noted      Pertinent Medications: MEDICATIONS  (STANDING):  ALBUTerol/ipratropium for Nebulization 3 milliLiter(s) Nebulizer every 6 hours  aspirin enteric coated 81 milliGRAM(s) Oral daily  atorvastatin 40 milliGRAM(s) Oral at bedtime  clopidogrel Tablet 75 milliGRAM(s) Oral daily  dextrose 5%. 1000 milliLiter(s) (50 mL/Hr) IV Continuous <Continuous>  dextrose 50% Injectable 12.5 Gram(s) IV Push once  dextrose 50% Injectable 25 Gram(s) IV Push once  dextrose 50% Injectable 25 Gram(s) IV Push once  enoxaparin Injectable 40 milliGRAM(s) SubCutaneous daily  insulin glargine Injectable (LANTUS) 65 Unit(s) SubCutaneous at bedtime  insulin lispro (HumaLOG) corrective regimen sliding scale   SubCutaneous Before meals and at bedtime  insulin lispro Injectable (HumaLOG) 15 Unit(s) SubCutaneous three times a day before meals  lisinopril 2.5 milliGRAM(s) Oral daily  nicotine - 21 mG/24Hr(s) Patch 1 patch Transdermal daily  nystatin    Suspension 089817 Unit(s) Oral four times a day  pantoprazole    Tablet 40 milliGRAM(s) Oral before breakfast  predniSONE   Tablet 40 milliGRAM(s) Oral daily  saccharomyces boulardii 250 milliGRAM(s) Oral two times a day  spironolactone 12.5 milliGRAM(s) Oral daily    MEDICATIONS  (PRN):  acetaminophen   Tablet .. 650 milliGRAM(s) Oral every 6 hours PRN Temp greater or equal to 38C (100.4F), Moderate Pain (4 - 6)  ALBUTerol    0.083% 2.5 milliGRAM(s) Nebulizer every 4 hours PRN Shortness of Breath and/or Wheezing  ALPRAZolam 0.5 milliGRAM(s) Oral three times a day PRN Anxiety  dextrose 40% Gel 15 Gram(s) Oral once PRN Blood Glucose LESS THAN 70 milliGRAM(s)/deciliter  glucagon  Injectable 1 milliGRAM(s) IntraMuscular once PRN Glucose LESS THAN 70 milligrams/deciliter  mirtazapine 7.5 milliGRAM(s) Oral at bedtime PRN Sleep    Nutrition focused physical exam conducted - found signs of malnutrition [ ]absent [ ]present    Subcutaneous fat loss: [ ] Orbital fat pads region, [ ]Buccal fat region, [ ]Triceps region,  [ ]Ribs region    Muscle wasting: [ ]Temples region, [ ]Clavicle region, [ ]Shoulder region, [ ]Scapula region, [ ]Interosseous region,  [ ]thigh region, [ ]Calf region    Estimated Needs:   [x ] no change since previous assessment  [ ] recalculated:     Current Nutrition Diagnosis: Pt remains at nutritional risk secondary to impaired nutrient utilization related to DM with heart failure as evidenced by elevated HgA1c (10.8%), Glucose, and FS readings. Pt has been F/u with DM education several times. Pt educated on HF diet recommendations during initial visit. Plan as of now for Pt to be DC to [facility].      Recommendations:   1. Continue Diet Rx: Consistent Carbohydrate/No Snack; DASH/TLC      Monitoring and Evaluation:   [x ] PO intake [x ] Tolerance to diet prescription [X] Weights  [X] Follow up per protocol [X] Labs:

## 2018-10-16 NOTE — PROGRESS NOTE ADULT - SUBJECTIVE AND OBJECTIVE BOX
DEVIKA CARBALLO Male 58y [id number]    Patient is a 58y old  Male who presents with a chief complaint of shortness of breath (15 Oct 2018 15:56)      Subjective/objective:  Pt seen and examined at bedside, no over night event reported by night staff. Pt offer no complaints, medically stable, pending level 2 clearance for TACO placement.      Review of system:  No fever, chills, nausea, vomiting, headache, dizziness, chest pain, SOB or palpitation.      PHYSICAL EXAM:    Vital Signs Last 24 Hrs  T(C): 37 (16 Oct 2018 04:00), Max: 37 (16 Oct 2018 04:00)  T(F): 98.6 (16 Oct 2018 04:00), Max: 98.6 (16 Oct 2018 04:00)  HR: 84 (16 Oct 2018 04:25) (77 - 100)  BP: 113/79 (16 Oct 2018 04:00) (98/61 - 113/79)  BP(mean): --  RR: 20 (15 Oct 2018 21:02) (18 - 20)  SpO2: 96% (16 Oct 2018 04:25) (93% - 98%)    GENERAL: Pt lying comfortably, NAD.  CHEST/LUNG: Clear to auscultation bilaterally; No wheezing.  HEART: S1S2+, Regular rate and rhythm; No murmurs.  ABDOMEN: Soft, Nontender, Nondistended; Bowel sounds present.  Extremities: No LE edema, pulses+  NEURO: AAOX3, no focal deficits, no motor r sensory loss.  PSYCH: normal mood.      MEDICATIONS  (STANDING):  ALBUTerol/ipratropium for Nebulization 3 milliLiter(s) Nebulizer every 6 hours  aspirin enteric coated 81 milliGRAM(s) Oral daily  atorvastatin 40 milliGRAM(s) Oral at bedtime  clopidogrel Tablet 75 milliGRAM(s) Oral daily  dextrose 5%. 1000 milliLiter(s) (50 mL/Hr) IV Continuous <Continuous>  dextrose 50% Injectable 12.5 Gram(s) IV Push once  dextrose 50% Injectable 25 Gram(s) IV Push once  dextrose 50% Injectable 25 Gram(s) IV Push once  enoxaparin Injectable 40 milliGRAM(s) SubCutaneous daily  insulin glargine Injectable (LANTUS) 65 Unit(s) SubCutaneous at bedtime  insulin lispro (HumaLOG) corrective regimen sliding scale   SubCutaneous Before meals and at bedtime  insulin lispro Injectable (HumaLOG) 15 Unit(s) SubCutaneous three times a day before meals  lisinopril 2.5 milliGRAM(s) Oral daily  nicotine - 21 mG/24Hr(s) Patch 1 patch Transdermal daily  nystatin    Suspension 685852 Unit(s) Oral four times a day  pantoprazole    Tablet 40 milliGRAM(s) Oral before breakfast  predniSONE   Tablet 40 milliGRAM(s) Oral daily  saccharomyces boulardii 250 milliGRAM(s) Oral two times a day  spironolactone 12.5 milliGRAM(s) Oral daily    MEDICATIONS  (PRN):  acetaminophen   Tablet .. 650 milliGRAM(s) Oral every 6 hours PRN Temp greater or equal to 38C (100.4F), Moderate Pain (4 - 6)  ALBUTerol    0.083% 2.5 milliGRAM(s) Nebulizer every 4 hours PRN Shortness of Breath and/or Wheezing  ALPRAZolam 0.5 milliGRAM(s) Oral three times a day PRN Anxiety  dextrose 40% Gel 15 Gram(s) Oral once PRN Blood Glucose LESS THAN 70 milliGRAM(s)/deciliter  glucagon  Injectable 1 milliGRAM(s) IntraMuscular once PRN Glucose LESS THAN 70 milligrams/deciliter  mirtazapine 7.5 milliGRAM(s) Oral at bedtime PRN Sleep        Labs:  LABS:

## 2018-10-17 LAB
GLUCOSE BLDC GLUCOMTR-MCNC: 148 MG/DL — HIGH (ref 70–99)
GLUCOSE BLDC GLUCOMTR-MCNC: 226 MG/DL — HIGH (ref 70–99)
GLUCOSE BLDC GLUCOMTR-MCNC: 228 MG/DL — HIGH (ref 70–99)
GLUCOSE BLDC GLUCOMTR-MCNC: 250 MG/DL — HIGH (ref 70–99)

## 2018-10-17 PROCEDURE — 99232 SBSQ HOSP IP/OBS MODERATE 35: CPT

## 2018-10-17 RX ORDER — OXYCODONE HYDROCHLORIDE 5 MG/1
5 TABLET ORAL ONCE
Qty: 0 | Refills: 0 | Status: DISCONTINUED | OUTPATIENT
Start: 2018-10-17 | End: 2018-10-17

## 2018-10-17 RX ADMIN — Medication 4: at 12:24

## 2018-10-17 RX ADMIN — Medication 3 MILLILITER(S): at 20:45

## 2018-10-17 RX ADMIN — Medication 3 MILLILITER(S): at 09:01

## 2018-10-17 RX ADMIN — Medication 81 MILLIGRAM(S): at 12:24

## 2018-10-17 RX ADMIN — Medication 1 PATCH: at 12:28

## 2018-10-17 RX ADMIN — Medication 500000 UNIT(S): at 05:37

## 2018-10-17 RX ADMIN — Medication 4: at 17:19

## 2018-10-17 RX ADMIN — Medication 650 MILLIGRAM(S): at 06:37

## 2018-10-17 RX ADMIN — SPIRONOLACTONE 12.5 MILLIGRAM(S): 25 TABLET, FILM COATED ORAL at 05:37

## 2018-10-17 RX ADMIN — Medication 3 MILLILITER(S): at 02:56

## 2018-10-17 RX ADMIN — PANTOPRAZOLE SODIUM 40 MILLIGRAM(S): 20 TABLET, DELAYED RELEASE ORAL at 05:37

## 2018-10-17 RX ADMIN — Medication 500000 UNIT(S): at 12:23

## 2018-10-17 RX ADMIN — Medication 3 MILLILITER(S): at 15:09

## 2018-10-17 RX ADMIN — OXYCODONE HYDROCHLORIDE 5 MILLIGRAM(S): 5 TABLET ORAL at 09:24

## 2018-10-17 RX ADMIN — Medication 4: at 22:06

## 2018-10-17 RX ADMIN — Medication 650 MILLIGRAM(S): at 18:20

## 2018-10-17 RX ADMIN — Medication 30 MILLIGRAM(S): at 05:37

## 2018-10-17 RX ADMIN — ATORVASTATIN CALCIUM 40 MILLIGRAM(S): 80 TABLET, FILM COATED ORAL at 22:07

## 2018-10-17 RX ADMIN — MIRTAZAPINE 7.5 MILLIGRAM(S): 45 TABLET, ORALLY DISINTEGRATING ORAL at 22:09

## 2018-10-17 RX ADMIN — Medication 250 MILLIGRAM(S): at 17:19

## 2018-10-17 RX ADMIN — CLOPIDOGREL BISULFATE 75 MILLIGRAM(S): 75 TABLET, FILM COATED ORAL at 12:24

## 2018-10-17 RX ADMIN — Medication 15 UNIT(S): at 12:24

## 2018-10-17 RX ADMIN — Medication 15 UNIT(S): at 17:19

## 2018-10-17 RX ADMIN — Medication 0.5 MILLIGRAM(S): at 22:13

## 2018-10-17 RX ADMIN — ENOXAPARIN SODIUM 40 MILLIGRAM(S): 100 INJECTION SUBCUTANEOUS at 12:24

## 2018-10-17 RX ADMIN — Medication 1 PATCH: at 12:24

## 2018-10-17 RX ADMIN — Medication 650 MILLIGRAM(S): at 05:40

## 2018-10-17 RX ADMIN — INSULIN GLARGINE 65 UNIT(S): 100 INJECTION, SOLUTION SUBCUTANEOUS at 22:06

## 2018-10-17 RX ADMIN — Medication 15 UNIT(S): at 07:38

## 2018-10-17 RX ADMIN — Medication 500000 UNIT(S): at 17:19

## 2018-10-17 RX ADMIN — OXYCODONE HYDROCHLORIDE 5 MILLIGRAM(S): 5 TABLET ORAL at 10:24

## 2018-10-17 RX ADMIN — Medication 1 PATCH: at 22:04

## 2018-10-17 RX ADMIN — LISINOPRIL 2.5 MILLIGRAM(S): 2.5 TABLET ORAL at 05:37

## 2018-10-17 RX ADMIN — Medication 650 MILLIGRAM(S): at 17:20

## 2018-10-17 RX ADMIN — Medication 250 MILLIGRAM(S): at 05:37

## 2018-10-17 NOTE — PROGRESS NOTE ADULT - ASSESSMENT
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 2015/2016, who presented with complaints of worsening shortness of breath and congestion. Found to have a positive rhinovirus on viral panel. Admitted for COPD exacerbation / viral pneumonia. Responded to IV steroids, nebulizers and clinically improved. Evaluated by cardiology/ pulmonology and recommendation appreciated. Pt medically stable and pending TACO placement.    Plan:    > Acute on chronic hypercapnic and hypoxic respiratory failure   - Multifactorial (COPD Exacerbation, Pulmonary HTN, and Diastolic Heart Failure) - RVP is positive for Rhinovirus. Continue prednisone taper, nebulizers, O2 supplements (Home o2 dependent) encourage OOB. Smoking cessation emphasized. O2NC 2L now. Per SW/ CCM Pt renting room and his landlord will not allow him on oxygen tank given active smoking- Pt initially refusing for TACO and now agreeable. Pending level 2 clearance for TACO.       > Smoker - Nicotine Patch. Smoking cessation counseled.   > Acute on chronic Diastolic CHF - C/w aldactone, Lisinopril. Metoprolol d/ilir due to pause by cardiology.   > COPD/ PHTN- Plan as above, O2 supplements, Prednisone taper, Duoneb. Pulmonology following.   > CAD and HLD - Continue Aspirin, Plavix, Lipitor. Metoprolol d/ilir by cardio as above.   > DMII- BG better, C/w Lantus 65, Pre meal insulin 15, LSS + FS monitoring. Prednisone being tapered.   > Anxiety / mood / Sleep disorder- Seen by Psych, started on Xanax/ Remeron.    > DVT Prophylaxis -- Lovenox 40 mg  > Dispo- Medically stable. Pending level 2 clearance for TACO.

## 2018-10-17 NOTE — PROGRESS NOTE ADULT - SUBJECTIVE AND OBJECTIVE BOX
DEVIKA CARBALLO Male 58y [id number]    Patient is a 58y old  Male who presents with a chief complaint of shortness of breath (16 Oct 2018 11:19)      Subjective/objective:  Pt seen and examined at bedside, no over night event reported by night staff. Pt offer no complaints, medically stable, pending level 2 clearance for TACO placement.      Review of system:  No fever, chills, nausea, vomiting, headache, dizziness, chest pain, SOB or palpitation.      PHYSICAL EXAM:    Vital Signs Last 24 Hrs  T(C): 36.6 (17 Oct 2018 04:56), Max: 36.7 (16 Oct 2018 15:56)  T(F): 97.8 (17 Oct 2018 04:56), Max: 98.1 (16 Oct 2018 15:56)  HR: 86 (17 Oct 2018 04:56) (71 - 87)  BP: 120/76 (17 Oct 2018 04:56) (85/58 - 120/76)  BP(mean): --  RR: 20 (17 Oct 2018 04:56) (18 - 20)  SpO2: 98% (17 Oct 2018 04:56) (92% - 98%)    GENERAL: Pt lying comfortably, NAD.  CHEST/LUNG: Clear to auscultation bilaterally; No wheezing.  HEART: S1S2+, Regular rate and rhythm; No murmurs.  ABDOMEN: Soft, Nontender, Nondistended; Bowel sounds present.  Extremities: No LE edema, pulses+  NEURO: AAOX3, no focal deficits, no motor r sensory loss.  PSYCH: normal mood.      MEDICATIONS  (STANDING):  ALBUTerol/ipratropium for Nebulization 3 milliLiter(s) Nebulizer every 6 hours  aspirin enteric coated 81 milliGRAM(s) Oral daily  atorvastatin 40 milliGRAM(s) Oral at bedtime  clopidogrel Tablet 75 milliGRAM(s) Oral daily  dextrose 5%. 1000 milliLiter(s) (50 mL/Hr) IV Continuous <Continuous>  dextrose 50% Injectable 12.5 Gram(s) IV Push once  dextrose 50% Injectable 25 Gram(s) IV Push once  dextrose 50% Injectable 25 Gram(s) IV Push once  enoxaparin Injectable 40 milliGRAM(s) SubCutaneous daily  insulin glargine Injectable (LANTUS) 65 Unit(s) SubCutaneous at bedtime  insulin lispro (HumaLOG) corrective regimen sliding scale   SubCutaneous Before meals and at bedtime  insulin lispro Injectable (HumaLOG) 15 Unit(s) SubCutaneous three times a day before meals  lisinopril 2.5 milliGRAM(s) Oral daily  nicotine - 21 mG/24Hr(s) Patch 1 patch Transdermal daily  nystatin    Suspension 840267 Unit(s) Oral four times a day  oxyCODONE    IR 5 milliGRAM(s) Oral once  pantoprazole    Tablet 40 milliGRAM(s) Oral before breakfast  predniSONE   Tablet 30 milliGRAM(s) Oral daily  saccharomyces boulardii 250 milliGRAM(s) Oral two times a day  spironolactone 12.5 milliGRAM(s) Oral daily    MEDICATIONS  (PRN):  acetaminophen   Tablet .. 650 milliGRAM(s) Oral every 6 hours PRN Temp greater or equal to 38C (100.4F), Moderate Pain (4 - 6)  ALBUTerol    0.083% 2.5 milliGRAM(s) Nebulizer every 4 hours PRN Shortness of Breath and/or Wheezing  ALPRAZolam 0.5 milliGRAM(s) Oral three times a day PRN Anxiety  dextrose 40% Gel 15 Gram(s) Oral once PRN Blood Glucose LESS THAN 70 milliGRAM(s)/deciliter  glucagon  Injectable 1 milliGRAM(s) IntraMuscular once PRN Glucose LESS THAN 70 milligrams/deciliter  mirtazapine 7.5 milliGRAM(s) Oral at bedtime PRN Sleep        Labs:  LABS:

## 2018-10-18 LAB
ANION GAP SERPL CALC-SCNC: 9 MMOL/L — SIGNIFICANT CHANGE UP (ref 5–17)
BUN SERPL-MCNC: 21 MG/DL — HIGH (ref 8–20)
CALCIUM SERPL-MCNC: 9.2 MG/DL — SIGNIFICANT CHANGE UP (ref 8.6–10.2)
CHLORIDE SERPL-SCNC: 95 MMOL/L — LOW (ref 98–107)
CO2 SERPL-SCNC: 31 MMOL/L — HIGH (ref 22–29)
CREAT SERPL-MCNC: 0.64 MG/DL — SIGNIFICANT CHANGE UP (ref 0.5–1.3)
GLUCOSE BLDC GLUCOMTR-MCNC: 248 MG/DL — HIGH (ref 70–99)
GLUCOSE BLDC GLUCOMTR-MCNC: 257 MG/DL — HIGH (ref 70–99)
GLUCOSE BLDC GLUCOMTR-MCNC: 276 MG/DL — HIGH (ref 70–99)
GLUCOSE BLDC GLUCOMTR-MCNC: 347 MG/DL — HIGH (ref 70–99)
GLUCOSE SERPL-MCNC: 336 MG/DL — HIGH (ref 70–115)
HCT VFR BLD CALC: 46 % — SIGNIFICANT CHANGE UP (ref 42–52)
HGB BLD-MCNC: 14.3 G/DL — SIGNIFICANT CHANGE UP (ref 14–18)
MCHC RBC-ENTMCNC: 28.8 PG — SIGNIFICANT CHANGE UP (ref 27–31)
MCHC RBC-ENTMCNC: 31.1 G/DL — LOW (ref 32–36)
MCV RBC AUTO: 92.6 FL — SIGNIFICANT CHANGE UP (ref 80–94)
PLATELET # BLD AUTO: 200 K/UL — SIGNIFICANT CHANGE UP (ref 150–400)
POTASSIUM SERPL-MCNC: 5 MMOL/L — SIGNIFICANT CHANGE UP (ref 3.5–5.3)
POTASSIUM SERPL-SCNC: 5 MMOL/L — SIGNIFICANT CHANGE UP (ref 3.5–5.3)
RBC # BLD: 4.97 M/UL — SIGNIFICANT CHANGE UP (ref 4.6–6.2)
RBC # FLD: 14.9 % — SIGNIFICANT CHANGE UP (ref 11–15.6)
SODIUM SERPL-SCNC: 135 MMOL/L — SIGNIFICANT CHANGE UP (ref 135–145)
WBC # BLD: 15.4 K/UL — HIGH (ref 4.8–10.8)
WBC # FLD AUTO: 15.4 K/UL — HIGH (ref 4.8–10.8)

## 2018-10-18 PROCEDURE — 70360 X-RAY EXAM OF NECK: CPT | Mod: 26

## 2018-10-18 PROCEDURE — 99233 SBSQ HOSP IP/OBS HIGH 50: CPT

## 2018-10-18 RX ORDER — INSULIN LISPRO 100/ML
18 VIAL (ML) SUBCUTANEOUS
Qty: 0 | Refills: 0 | Status: DISCONTINUED | OUTPATIENT
Start: 2018-10-18 | End: 2018-10-25

## 2018-10-18 RX ORDER — INSULIN GLARGINE 100 [IU]/ML
70 INJECTION, SOLUTION SUBCUTANEOUS AT BEDTIME
Qty: 0 | Refills: 0 | Status: DISCONTINUED | OUTPATIENT
Start: 2018-10-18 | End: 2018-10-25

## 2018-10-18 RX ADMIN — Medication 15 UNIT(S): at 16:42

## 2018-10-18 RX ADMIN — Medication 0.5 MILLIGRAM(S): at 21:16

## 2018-10-18 RX ADMIN — ATORVASTATIN CALCIUM 40 MILLIGRAM(S): 80 TABLET, FILM COATED ORAL at 21:09

## 2018-10-18 RX ADMIN — Medication 3 MILLILITER(S): at 20:38

## 2018-10-18 RX ADMIN — Medication 650 MILLIGRAM(S): at 23:23

## 2018-10-18 RX ADMIN — Medication 8: at 16:43

## 2018-10-18 RX ADMIN — Medication 500000 UNIT(S): at 17:10

## 2018-10-18 RX ADMIN — PANTOPRAZOLE SODIUM 40 MILLIGRAM(S): 20 TABLET, DELAYED RELEASE ORAL at 05:29

## 2018-10-18 RX ADMIN — LISINOPRIL 2.5 MILLIGRAM(S): 2.5 TABLET ORAL at 05:29

## 2018-10-18 RX ADMIN — Medication 500000 UNIT(S): at 00:04

## 2018-10-18 RX ADMIN — Medication 3 MILLILITER(S): at 03:59

## 2018-10-18 RX ADMIN — Medication 6: at 08:16

## 2018-10-18 RX ADMIN — SPIRONOLACTONE 12.5 MILLIGRAM(S): 25 TABLET, FILM COATED ORAL at 05:29

## 2018-10-18 RX ADMIN — Medication 81 MILLIGRAM(S): at 11:25

## 2018-10-18 RX ADMIN — Medication 1 PATCH: at 21:09

## 2018-10-18 RX ADMIN — Medication 1 PATCH: at 11:25

## 2018-10-18 RX ADMIN — Medication 3 MILLILITER(S): at 14:57

## 2018-10-18 RX ADMIN — Medication 30 MILLIGRAM(S): at 05:29

## 2018-10-18 RX ADMIN — Medication 4: at 11:26

## 2018-10-18 RX ADMIN — ENOXAPARIN SODIUM 40 MILLIGRAM(S): 100 INJECTION SUBCUTANEOUS at 11:25

## 2018-10-18 RX ADMIN — Medication 250 MILLIGRAM(S): at 17:10

## 2018-10-18 RX ADMIN — Medication 500000 UNIT(S): at 23:23

## 2018-10-18 RX ADMIN — Medication 15 UNIT(S): at 11:25

## 2018-10-18 RX ADMIN — Medication 500000 UNIT(S): at 05:29

## 2018-10-18 RX ADMIN — CLOPIDOGREL BISULFATE 75 MILLIGRAM(S): 75 TABLET, FILM COATED ORAL at 11:25

## 2018-10-18 RX ADMIN — Medication 650 MILLIGRAM(S): at 06:22

## 2018-10-18 RX ADMIN — Medication 650 MILLIGRAM(S): at 05:30

## 2018-10-18 RX ADMIN — Medication 3 MILLILITER(S): at 08:47

## 2018-10-18 RX ADMIN — Medication 1 PATCH: at 11:29

## 2018-10-18 RX ADMIN — Medication 15 UNIT(S): at 08:16

## 2018-10-18 RX ADMIN — Medication 250 MILLIGRAM(S): at 05:29

## 2018-10-18 RX ADMIN — INSULIN GLARGINE 70 UNIT(S): 100 INJECTION, SOLUTION SUBCUTANEOUS at 21:09

## 2018-10-18 RX ADMIN — Medication 650 MILLIGRAM(S): at 17:12

## 2018-10-18 RX ADMIN — MIRTAZAPINE 7.5 MILLIGRAM(S): 45 TABLET, ORALLY DISINTEGRATING ORAL at 23:23

## 2018-10-18 RX ADMIN — Medication 500000 UNIT(S): at 11:25

## 2018-10-18 RX ADMIN — Medication 6: at 21:16

## 2018-10-18 NOTE — CHART NOTE - NSCHARTNOTEFT_GEN_A_CORE
Pt seen and examined at bedside, he offer no complaints, doing well. He is awaiting level 2 clearance for TACO placement. Pt made ALC.

## 2018-10-19 LAB
APPEARANCE UR: CLEAR — SIGNIFICANT CHANGE UP
BACTERIA # UR AUTO: ABNORMAL
BILIRUB UR-MCNC: NEGATIVE — SIGNIFICANT CHANGE UP
COLOR SPEC: YELLOW — SIGNIFICANT CHANGE UP
DIFF PNL FLD: ABNORMAL
GLUCOSE BLDC GLUCOMTR-MCNC: 157 MG/DL — HIGH (ref 70–99)
GLUCOSE BLDC GLUCOMTR-MCNC: 192 MG/DL — HIGH (ref 70–99)
GLUCOSE BLDC GLUCOMTR-MCNC: 231 MG/DL — HIGH (ref 70–99)
GLUCOSE BLDC GLUCOMTR-MCNC: 245 MG/DL — HIGH (ref 70–99)
GLUCOSE UR QL: 1000 MG/DL
HCT VFR BLD CALC: 43.1 % — SIGNIFICANT CHANGE UP (ref 42–52)
HGB BLD-MCNC: 14 G/DL — SIGNIFICANT CHANGE UP (ref 14–18)
KETONES UR-MCNC: NEGATIVE — SIGNIFICANT CHANGE UP
LEUKOCYTE ESTERASE UR-ACNC: ABNORMAL
MCHC RBC-ENTMCNC: 29.2 PG — SIGNIFICANT CHANGE UP (ref 27–31)
MCHC RBC-ENTMCNC: 32.5 G/DL — SIGNIFICANT CHANGE UP (ref 32–36)
MCV RBC AUTO: 90 FL — SIGNIFICANT CHANGE UP (ref 80–94)
NITRITE UR-MCNC: POSITIVE
PH UR: 7 — SIGNIFICANT CHANGE UP (ref 5–8)
PLATELET # BLD AUTO: 209 K/UL — SIGNIFICANT CHANGE UP (ref 150–400)
PROT UR-MCNC: 15 MG/DL
RBC # BLD: 4.79 M/UL — SIGNIFICANT CHANGE UP (ref 4.6–6.2)
RBC # FLD: 14.9 % — SIGNIFICANT CHANGE UP (ref 11–15.6)
RBC CASTS # UR COMP ASSIST: SIGNIFICANT CHANGE UP /HPF (ref 0–4)
SP GR SPEC: 1.01 — SIGNIFICANT CHANGE UP (ref 1.01–1.02)
UROBILINOGEN FLD QL: NEGATIVE MG/DL — SIGNIFICANT CHANGE UP
WBC # BLD: 13.6 K/UL — HIGH (ref 4.8–10.8)
WBC # FLD AUTO: 13.6 K/UL — HIGH (ref 4.8–10.8)
WBC UR QL: ABNORMAL

## 2018-10-19 RX ORDER — IBUPROFEN 200 MG
400 TABLET ORAL EVERY 12 HOURS
Qty: 0 | Refills: 0 | Status: COMPLETED | OUTPATIENT
Start: 2018-10-19 | End: 2018-10-21

## 2018-10-19 RX ADMIN — ENOXAPARIN SODIUM 40 MILLIGRAM(S): 100 INJECTION SUBCUTANEOUS at 11:42

## 2018-10-19 RX ADMIN — Medication 4: at 16:47

## 2018-10-19 RX ADMIN — Medication 500000 UNIT(S): at 06:05

## 2018-10-19 RX ADMIN — ATORVASTATIN CALCIUM 40 MILLIGRAM(S): 80 TABLET, FILM COATED ORAL at 22:46

## 2018-10-19 RX ADMIN — Medication 18 UNIT(S): at 08:12

## 2018-10-19 RX ADMIN — Medication 250 MILLIGRAM(S): at 18:19

## 2018-10-19 RX ADMIN — Medication 1 PATCH: at 11:41

## 2018-10-19 RX ADMIN — INSULIN GLARGINE 70 UNIT(S): 100 INJECTION, SOLUTION SUBCUTANEOUS at 22:45

## 2018-10-19 RX ADMIN — Medication 400 MILLIGRAM(S): at 12:09

## 2018-10-19 RX ADMIN — Medication 650 MILLIGRAM(S): at 00:23

## 2018-10-19 RX ADMIN — PANTOPRAZOLE SODIUM 40 MILLIGRAM(S): 20 TABLET, DELAYED RELEASE ORAL at 06:06

## 2018-10-19 RX ADMIN — Medication 2: at 11:42

## 2018-10-19 RX ADMIN — Medication 500000 UNIT(S): at 22:47

## 2018-10-19 RX ADMIN — Medication 400 MILLIGRAM(S): at 11:41

## 2018-10-19 RX ADMIN — Medication 650 MILLIGRAM(S): at 06:58

## 2018-10-19 RX ADMIN — Medication 18 UNIT(S): at 11:42

## 2018-10-19 RX ADMIN — Medication 1 PATCH: at 11:49

## 2018-10-19 RX ADMIN — Medication 3 MILLILITER(S): at 21:48

## 2018-10-19 RX ADMIN — Medication 81 MILLIGRAM(S): at 11:42

## 2018-10-19 RX ADMIN — Medication 500000 UNIT(S): at 18:19

## 2018-10-19 RX ADMIN — Medication 500000 UNIT(S): at 11:41

## 2018-10-19 RX ADMIN — Medication 20 MILLIGRAM(S): at 06:06

## 2018-10-19 RX ADMIN — Medication 2: at 08:13

## 2018-10-19 RX ADMIN — Medication 4: at 22:45

## 2018-10-19 RX ADMIN — Medication 400 MILLIGRAM(S): at 18:32

## 2018-10-19 RX ADMIN — Medication 250 MILLIGRAM(S): at 06:06

## 2018-10-19 RX ADMIN — Medication 1 PATCH: at 05:06

## 2018-10-19 RX ADMIN — Medication 18 UNIT(S): at 16:47

## 2018-10-19 RX ADMIN — Medication 650 MILLIGRAM(S): at 07:30

## 2018-10-19 RX ADMIN — Medication 650 MILLIGRAM(S): at 22:49

## 2018-10-19 RX ADMIN — Medication 3 MILLILITER(S): at 08:37

## 2018-10-19 RX ADMIN — SPIRONOLACTONE 12.5 MILLIGRAM(S): 25 TABLET, FILM COATED ORAL at 06:05

## 2018-10-19 RX ADMIN — CLOPIDOGREL BISULFATE 75 MILLIGRAM(S): 75 TABLET, FILM COATED ORAL at 11:41

## 2018-10-19 RX ADMIN — Medication 400 MILLIGRAM(S): at 18:19

## 2018-10-19 NOTE — CHART NOTE - NSCHARTNOTEFT_GEN_A_CORE
Pt seen and examined at bedside, no overnight event reported. pt reports some neck pain, on exam has chronic lump, xray neck unremarkable. WBC 15, Pt afebrile, no sign of infection, UA pending- will follow.    Pt is ALC, pending level 2 clearance for TACO.

## 2018-10-20 LAB
GLUCOSE BLDC GLUCOMTR-MCNC: 181 MG/DL — HIGH (ref 70–99)
GLUCOSE BLDC GLUCOMTR-MCNC: 181 MG/DL — HIGH (ref 70–99)
GLUCOSE BLDC GLUCOMTR-MCNC: 276 MG/DL — HIGH (ref 70–99)
GLUCOSE BLDC GLUCOMTR-MCNC: 309 MG/DL — HIGH (ref 70–99)
HCT VFR BLD CALC: 44.7 % — SIGNIFICANT CHANGE UP (ref 42–52)
HGB BLD-MCNC: 14.4 G/DL — SIGNIFICANT CHANGE UP (ref 14–18)
LACTATE SERPL-SCNC: 1.8 MMOL/L — SIGNIFICANT CHANGE UP (ref 0.5–2)
MCHC RBC-ENTMCNC: 28.9 PG — SIGNIFICANT CHANGE UP (ref 27–31)
MCHC RBC-ENTMCNC: 32.2 G/DL — SIGNIFICANT CHANGE UP (ref 32–36)
MCV RBC AUTO: 89.8 FL — SIGNIFICANT CHANGE UP (ref 80–94)
PLATELET # BLD AUTO: 196 K/UL — SIGNIFICANT CHANGE UP (ref 150–400)
PROCALCITONIN SERPL-MCNC: <0.05 NG/ML — SIGNIFICANT CHANGE UP (ref 0–0.04)
RBC # BLD: 4.98 M/UL — SIGNIFICANT CHANGE UP (ref 4.6–6.2)
RBC # FLD: 15 % — SIGNIFICANT CHANGE UP (ref 11–15.6)
WBC # BLD: 16.4 K/UL — HIGH (ref 4.8–10.8)
WBC # FLD AUTO: 16.4 K/UL — HIGH (ref 4.8–10.8)

## 2018-10-20 RX ADMIN — Medication 650 MILLIGRAM(S): at 11:16

## 2018-10-20 RX ADMIN — Medication 1 PATCH: at 17:22

## 2018-10-20 RX ADMIN — Medication 6: at 22:23

## 2018-10-20 RX ADMIN — Medication 1 TABLET(S): at 13:31

## 2018-10-20 RX ADMIN — Medication 650 MILLIGRAM(S): at 23:05

## 2018-10-20 RX ADMIN — LISINOPRIL 2.5 MILLIGRAM(S): 2.5 TABLET ORAL at 06:17

## 2018-10-20 RX ADMIN — SPIRONOLACTONE 12.5 MILLIGRAM(S): 25 TABLET, FILM COATED ORAL at 06:17

## 2018-10-20 RX ADMIN — Medication 650 MILLIGRAM(S): at 22:33

## 2018-10-20 RX ADMIN — Medication 250 MILLIGRAM(S): at 06:17

## 2018-10-20 RX ADMIN — Medication 250 MILLIGRAM(S): at 17:40

## 2018-10-20 RX ADMIN — ENOXAPARIN SODIUM 40 MILLIGRAM(S): 100 INJECTION SUBCUTANEOUS at 11:16

## 2018-10-20 RX ADMIN — Medication 3 MILLILITER(S): at 09:35

## 2018-10-20 RX ADMIN — Medication 500000 UNIT(S): at 06:18

## 2018-10-20 RX ADMIN — Medication 650 MILLIGRAM(S): at 11:45

## 2018-10-20 RX ADMIN — Medication 400 MILLIGRAM(S): at 06:17

## 2018-10-20 RX ADMIN — Medication 400 MILLIGRAM(S): at 17:40

## 2018-10-20 RX ADMIN — Medication 81 MILLIGRAM(S): at 11:17

## 2018-10-20 RX ADMIN — Medication 0.5 MILLIGRAM(S): at 22:32

## 2018-10-20 RX ADMIN — Medication 18 UNIT(S): at 17:50

## 2018-10-20 RX ADMIN — INSULIN GLARGINE 70 UNIT(S): 100 INJECTION, SOLUTION SUBCUTANEOUS at 22:53

## 2018-10-20 RX ADMIN — Medication 1 PATCH: at 06:19

## 2018-10-20 RX ADMIN — Medication 3 MILLILITER(S): at 15:32

## 2018-10-20 RX ADMIN — Medication 18 UNIT(S): at 08:59

## 2018-10-20 RX ADMIN — CLOPIDOGREL BISULFATE 75 MILLIGRAM(S): 75 TABLET, FILM COATED ORAL at 11:17

## 2018-10-20 RX ADMIN — PANTOPRAZOLE SODIUM 40 MILLIGRAM(S): 20 TABLET, DELAYED RELEASE ORAL at 06:17

## 2018-10-20 RX ADMIN — Medication 500000 UNIT(S): at 17:40

## 2018-10-20 RX ADMIN — Medication 1 PATCH: at 11:17

## 2018-10-20 RX ADMIN — ATORVASTATIN CALCIUM 40 MILLIGRAM(S): 80 TABLET, FILM COATED ORAL at 22:22

## 2018-10-20 RX ADMIN — Medication 20 MILLIGRAM(S): at 06:17

## 2018-10-20 RX ADMIN — Medication 500000 UNIT(S): at 11:17

## 2018-10-20 RX ADMIN — Medication 3 MILLILITER(S): at 20:15

## 2018-10-20 RX ADMIN — Medication 400 MILLIGRAM(S): at 06:47

## 2018-10-20 RX ADMIN — Medication 1 PATCH: at 12:10

## 2018-10-20 RX ADMIN — Medication 3 MILLILITER(S): at 03:34

## 2018-10-20 RX ADMIN — Medication 8: at 13:38

## 2018-10-20 RX ADMIN — Medication 18 UNIT(S): at 13:38

## 2018-10-20 RX ADMIN — Medication 2: at 17:49

## 2018-10-20 RX ADMIN — Medication 1 TABLET(S): at 17:40

## 2018-10-20 RX ADMIN — Medication 2: at 08:59

## 2018-10-20 NOTE — CHART NOTE - NSCHARTNOTEFT_GEN_A_CORE
Pt seen and examined at bedside, no overnight event reported. Pt reports some neck pain and has small lump which is chronic, he denied any dysuria, urinary frequency or urgency. On exam has chronic lump in neck with some erythema, xray neck unremarkable. WBC 16 upgoing, Pt afebrile, no sign of infection, UA abnormal. Will check procalcitonin, blood lactate, start PO Augmentin to cover possible UTI and also mild erythema on neck lump. Urine cx pending.     Pt is ALC, pending level 2 clearance for TACO.

## 2018-10-21 ENCOUNTER — TRANSCRIPTION ENCOUNTER (OUTPATIENT)
Age: 58
End: 2018-10-21

## 2018-10-21 LAB
ANION GAP SERPL CALC-SCNC: 10 MMOL/L — SIGNIFICANT CHANGE UP (ref 5–17)
BUN SERPL-MCNC: 24 MG/DL — HIGH (ref 8–20)
CALCIUM SERPL-MCNC: 8.9 MG/DL — SIGNIFICANT CHANGE UP (ref 8.6–10.2)
CHLORIDE SERPL-SCNC: 98 MMOL/L — SIGNIFICANT CHANGE UP (ref 98–107)
CO2 SERPL-SCNC: 30 MMOL/L — HIGH (ref 22–29)
CREAT SERPL-MCNC: 0.55 MG/DL — SIGNIFICANT CHANGE UP (ref 0.5–1.3)
CULTURE RESULTS: SIGNIFICANT CHANGE UP
GLUCOSE BLDC GLUCOMTR-MCNC: 103 MG/DL — HIGH (ref 70–99)
GLUCOSE BLDC GLUCOMTR-MCNC: 161 MG/DL — HIGH (ref 70–99)
GLUCOSE BLDC GLUCOMTR-MCNC: 170 MG/DL — HIGH (ref 70–99)
GLUCOSE BLDC GLUCOMTR-MCNC: 246 MG/DL — HIGH (ref 70–99)
GLUCOSE SERPL-MCNC: 182 MG/DL — HIGH (ref 70–115)
HCT VFR BLD CALC: 43.1 % — SIGNIFICANT CHANGE UP (ref 42–52)
HGB BLD-MCNC: 13.6 G/DL — LOW (ref 14–18)
MCHC RBC-ENTMCNC: 28.6 PG — SIGNIFICANT CHANGE UP (ref 27–31)
MCHC RBC-ENTMCNC: 31.6 G/DL — LOW (ref 32–36)
MCV RBC AUTO: 90.7 FL — SIGNIFICANT CHANGE UP (ref 80–94)
PLATELET # BLD AUTO: 174 K/UL — SIGNIFICANT CHANGE UP (ref 150–400)
POTASSIUM SERPL-MCNC: 4.6 MMOL/L — SIGNIFICANT CHANGE UP (ref 3.5–5.3)
POTASSIUM SERPL-SCNC: 4.6 MMOL/L — SIGNIFICANT CHANGE UP (ref 3.5–5.3)
RBC # BLD: 4.75 M/UL — SIGNIFICANT CHANGE UP (ref 4.6–6.2)
RBC # FLD: 14.9 % — SIGNIFICANT CHANGE UP (ref 11–15.6)
SODIUM SERPL-SCNC: 138 MMOL/L — SIGNIFICANT CHANGE UP (ref 135–145)
SPECIMEN SOURCE: SIGNIFICANT CHANGE UP
WBC # BLD: 14.6 K/UL — HIGH (ref 4.8–10.8)
WBC # FLD AUTO: 14.6 K/UL — HIGH (ref 4.8–10.8)

## 2018-10-21 PROCEDURE — 99231 SBSQ HOSP IP/OBS SF/LOW 25: CPT

## 2018-10-21 RX ORDER — ALPRAZOLAM 0.25 MG
0.25 TABLET ORAL THREE TIMES A DAY
Qty: 0 | Refills: 0 | Status: DISCONTINUED | OUTPATIENT
Start: 2018-10-21 | End: 2018-10-26

## 2018-10-21 RX ORDER — IBUPROFEN 200 MG
400 TABLET ORAL EVERY 8 HOURS
Qty: 0 | Refills: 0 | Status: COMPLETED | OUTPATIENT
Start: 2018-10-21 | End: 2018-10-23

## 2018-10-21 RX ADMIN — Medication 18 UNIT(S): at 16:33

## 2018-10-21 RX ADMIN — CLOPIDOGREL BISULFATE 75 MILLIGRAM(S): 75 TABLET, FILM COATED ORAL at 11:24

## 2018-10-21 RX ADMIN — ENOXAPARIN SODIUM 40 MILLIGRAM(S): 100 INJECTION SUBCUTANEOUS at 11:23

## 2018-10-21 RX ADMIN — Medication 10 MILLIGRAM(S): at 06:45

## 2018-10-21 RX ADMIN — Medication 500000 UNIT(S): at 11:23

## 2018-10-21 RX ADMIN — Medication 400 MILLIGRAM(S): at 11:30

## 2018-10-21 RX ADMIN — Medication 1 PATCH: at 11:22

## 2018-10-21 RX ADMIN — PANTOPRAZOLE SODIUM 40 MILLIGRAM(S): 20 TABLET, DELAYED RELEASE ORAL at 06:44

## 2018-10-21 RX ADMIN — SPIRONOLACTONE 12.5 MILLIGRAM(S): 25 TABLET, FILM COATED ORAL at 06:44

## 2018-10-21 RX ADMIN — Medication 250 MILLIGRAM(S): at 17:11

## 2018-10-21 RX ADMIN — Medication 400 MILLIGRAM(S): at 14:16

## 2018-10-21 RX ADMIN — Medication 400 MILLIGRAM(S): at 06:43

## 2018-10-21 RX ADMIN — Medication 400 MILLIGRAM(S): at 13:53

## 2018-10-21 RX ADMIN — Medication 1 TABLET(S): at 06:45

## 2018-10-21 RX ADMIN — Medication 650 MILLIGRAM(S): at 12:24

## 2018-10-21 RX ADMIN — INSULIN GLARGINE 70 UNIT(S): 100 INJECTION, SOLUTION SUBCUTANEOUS at 21:50

## 2018-10-21 RX ADMIN — Medication 3 MILLILITER(S): at 09:17

## 2018-10-21 RX ADMIN — Medication 1 TABLET(S): at 17:12

## 2018-10-21 RX ADMIN — Medication 2: at 07:52

## 2018-10-21 RX ADMIN — MIRTAZAPINE 7.5 MILLIGRAM(S): 45 TABLET, ORALLY DISINTEGRATING ORAL at 21:51

## 2018-10-21 RX ADMIN — Medication 81 MILLIGRAM(S): at 11:24

## 2018-10-21 RX ADMIN — Medication 400 MILLIGRAM(S): at 22:52

## 2018-10-21 RX ADMIN — Medication 4: at 21:58

## 2018-10-21 RX ADMIN — Medication 400 MILLIGRAM(S): at 11:23

## 2018-10-21 RX ADMIN — Medication 250 MILLIGRAM(S): at 06:43

## 2018-10-21 RX ADMIN — ATORVASTATIN CALCIUM 40 MILLIGRAM(S): 80 TABLET, FILM COATED ORAL at 21:51

## 2018-10-21 RX ADMIN — Medication 1 PATCH: at 11:10

## 2018-10-21 RX ADMIN — Medication 650 MILLIGRAM(S): at 13:15

## 2018-10-21 RX ADMIN — LISINOPRIL 2.5 MILLIGRAM(S): 2.5 TABLET ORAL at 06:45

## 2018-10-21 RX ADMIN — Medication 500000 UNIT(S): at 17:11

## 2018-10-21 RX ADMIN — Medication 500000 UNIT(S): at 23:15

## 2018-10-21 RX ADMIN — Medication 0.25 MILLIGRAM(S): at 21:52

## 2018-10-21 RX ADMIN — Medication 400 MILLIGRAM(S): at 21:52

## 2018-10-21 RX ADMIN — Medication 18 UNIT(S): at 11:23

## 2018-10-21 RX ADMIN — Medication 18 UNIT(S): at 07:52

## 2018-10-21 RX ADMIN — Medication 3 MILLILITER(S): at 20:29

## 2018-10-21 RX ADMIN — Medication 400 MILLIGRAM(S): at 07:52

## 2018-10-21 RX ADMIN — Medication 2: at 16:33

## 2018-10-21 RX ADMIN — Medication 1 PATCH: at 07:53

## 2018-10-21 RX ADMIN — Medication 3 MILLILITER(S): at 16:08

## 2018-10-21 RX ADMIN — Medication 1 PATCH: at 19:16

## 2018-10-21 NOTE — DISCHARGE NOTE ADULT - PLAN OF CARE
Resolved. C/w home Oxygen and medication as reconciled.   Follow up with your PMD and pulmonology. Plan as above. F/u with Pulmonology and cardiology. C/w medication as reconciled. C/w home medication. F/u with PMD. C/w home medication and f/u with Cardiology. C/a medication as reconciled. Improved

## 2018-10-21 NOTE — DISCHARGE NOTE ADULT - MEDICATION SUMMARY - MEDICATIONS TO TAKE
I will START or STAY ON the medications listed below when I get home from the hospital:    spironolactone 25 mg oral tablet  -- 0.5 tab(s) by mouth once a day  -- Indication: For Diuretic    aspirin 81 mg oral tablet  -- 1 tab(s) by mouth once a day  -- Indication: For CAD (coronary artery disease)    lisinopril 2.5 mg oral tablet  -- 1 tab(s) by mouth once a day  -- Indication: For Hypertension    mirtazapine 7.5 mg oral tablet  -- 1 tab(s) by mouth once a day (at bedtime), As needed, Sleep  -- Indication: For Depression    insulin glargine  -- 40 unit(s) subcutaneous 2 times a day  -- Indication: For Diabetes    HumaLOG 100 units/mL subcutaneous solution  -- 10 unit(s) subcutaneous 3 times a day (before meals)  -- Indication: For Diabetes    atorvastatin 40 mg oral tablet  -- 1 tab(s) by mouth once a day  -- Indication: For Hyperlipidemia, unspecified hyperlipidemia type    Plavix 75 mg oral tablet  -- 1 tab(s) by mouth once a day  -- Indication: For CAD (coronary artery disease)    ALPRAZolam 0.25 mg oral tablet  -- 1 tab(s) by mouth 3 times a day, As needed, Anxiety/ Agitation  -- Indication: For Anxiety    albuterol 2.5 mg/3 mL (0.083%) inhalation solution  -- 2.5 milligram(s) inhaled every 6 hours, As Needed SOB  -- Indication: For COPD (chronic obstructive pulmonary disease)    Protonix 40 mg oral delayed release tablet  -- 1 tab(s) by mouth once a day  -- Indication: For Gastroesophageal reflux disease, esophagitis presence not specified    nicotine 21 mg/24 hr transdermal film, extended release  -- 1 patch by transdermal patch once a day  -- Indication: For Smoking Cessation

## 2018-10-21 NOTE — PROGRESS NOTE ADULT - SUBJECTIVE AND OBJECTIVE BOX
DEVIKA CARBALLO Male 58y [id number]    Patient is a 58y old  Male who presents with a chief complaint of shortness of breath (17 Oct 2018 08:45)      Off service note:  Pt seen and examined at bedside, here for respiratory failure, no over night event reported by night staff. Pt offers no complaints today. Pt is medically cleared pending level 2 clearance for TACO placement.     Review of system:  No fever, chills, nausea, vomiting, headache, dizziness, chest pain, SOB or palpitation.      PHYSICAL EXAM:    Vital Signs Last 24 Hrs  T(C): 36.9 (21 Oct 2018 05:26), Max: 36.9 (21 Oct 2018 05:26)  T(F): 98.4 (21 Oct 2018 05:26), Max: 98.4 (21 Oct 2018 05:26)  HR: 60 (21 Oct 2018 05:26) (60 - 98)  BP: 116/65 (21 Oct 2018 05:26) (108/84 - 116/65)  BP(mean): --  RR: 18 (20 Oct 2018 19:19) (16 - 20)  SpO2: 95% (20 Oct 2018 20:16) (93% - 95%)    GENERAL: Pt lying comfortably, NAD.  Neck: Chronic lump in neck with some erythema,  CHEST/LUNG: Clear to auscultation bilaterally; No wheezing.  HEART: S1S2+, Regular rate and rhythm; No murmurs.  ABDOMEN: Soft, Nontender, Nondistended; Bowel sounds present.  Extremities: No LE edema, pulses+  NEURO: AAOX3, no focal deficits, no motor r sensory loss.  PSYCH: normal mood.      MEDICATIONS  (STANDING):  ALBUTerol/ipratropium for Nebulization 3 milliLiter(s) Nebulizer every 6 hours  amoxicillin  500 milliGRAM(s)/clavulanate 1 Tablet(s) Oral two times a day  aspirin enteric coated 81 milliGRAM(s) Oral daily  atorvastatin 40 milliGRAM(s) Oral at bedtime  clopidogrel Tablet 75 milliGRAM(s) Oral daily  dextrose 5%. 1000 milliLiter(s) (50 mL/Hr) IV Continuous <Continuous>  dextrose 50% Injectable 12.5 Gram(s) IV Push once  dextrose 50% Injectable 25 Gram(s) IV Push once  dextrose 50% Injectable 25 Gram(s) IV Push once  enoxaparin Injectable 40 milliGRAM(s) SubCutaneous daily  insulin glargine Injectable (LANTUS) 70 Unit(s) SubCutaneous at bedtime  insulin lispro (HumaLOG) corrective regimen sliding scale   SubCutaneous Before meals and at bedtime  insulin lispro Injectable (HumaLOG) 18 Unit(s) SubCutaneous three times a day before meals  lisinopril 2.5 milliGRAM(s) Oral daily  nicotine - 21 mG/24Hr(s) Patch 1 patch Transdermal daily  nystatin    Suspension 745906 Unit(s) Oral four times a day  pantoprazole    Tablet 40 milliGRAM(s) Oral before breakfast  predniSONE   Tablet 10 milliGRAM(s) Oral daily  saccharomyces boulardii 250 milliGRAM(s) Oral two times a day  spironolactone 12.5 milliGRAM(s) Oral daily    MEDICATIONS  (PRN):  acetaminophen   Tablet .. 650 milliGRAM(s) Oral every 6 hours PRN Temp greater or equal to 38C (100.4F), Moderate Pain (4 - 6)  ALBUTerol    0.083% 2.5 milliGRAM(s) Nebulizer every 4 hours PRN Shortness of Breath and/or Wheezing  dextrose 40% Gel 15 Gram(s) Oral once PRN Blood Glucose LESS THAN 70 milliGRAM(s)/deciliter  glucagon  Injectable 1 milliGRAM(s) IntraMuscular once PRN Glucose LESS THAN 70 milligrams/deciliter  mirtazapine 7.5 milliGRAM(s) Oral at bedtime PRN Sleep        Labs:  LABS:                        14.4   16.4  )-----------( 196      ( 20 Oct 2018 08:54 )             44.7                 Urinalysis Basic - ( 19 Oct 2018 08:02 )    Color: Yellow / Appearance: Clear / S.010 / pH: x  Gluc: x / Ketone: Negative  / Bili: Negative / Urobili: Negative mg/dL   Blood: x / Protein: 15 mg/dL / Nitrite: Positive   Leuk Esterase: Small / RBC: 0-2 /HPF / WBC 11-25   Sq Epi: x / Non Sq Epi: x / Bacteria: Many

## 2018-10-21 NOTE — DISCHARGE NOTE ADULT - CARE PROVIDER_API CALL
Ortiz Gupta), Cardiovascular Disease  [address] [place] [address] [address]  [place] [address]  Phone: [phone]  Fax: [phone]    Christiano Araujo), Internal Medicine; Pulmonary Disease  [place] [address]  Phone: [phone]  Fax: [phone]    PMD,   Phone: (   )    -  Fax: (   )    - Ortiz Gupta), Cardiovascular Disease  [address] [facility]  [address]  [place] [address]  Phone: [phone]  Fax: [phone]    Christiano Araujo), Internal Medicine; Pulmonary Disease  P[facility] Medicine at [place]  [address] [facility] [address]  [place] [address]  Phone: [phone]  Fax: [phone]    PMD,   Phone: (   )    -  Fax: (   )    -    Christiane Guan), Surgery; Surgical Critical Care  [address] [place] [address]  Phone: [phone]  Fax: [phone]

## 2018-10-21 NOTE — DISCHARGE NOTE ADULT - PATIENT PORTAL LINK FT
You can access the [facility] Patient Portal, offered by [facility], by registering with the following website: [email or web address]

## 2018-10-21 NOTE — DISCHARGE NOTE ADULT - CARE PROVIDERS DIRECT ADDRESSES
,[email or web address],[email or web address] ,[email or web address],DirectAddress_Unknown,[email or web address]

## 2018-10-21 NOTE — DISCHARGE NOTE ADULT - PROVIDER TOKENS
TOKEN:'[id number]',TOKEN:'[id number]',FREE:[LAST:[PMD],PHONE:[(   )    -],FAX:[(   )    -]] TOKEN:'[id number]',TOKEN:'[id number]',FREE:[LAST:[PMD],PHONE:[(   )    -],FAX:[(   )    -]],TOKEN:'[id number]'

## 2018-10-21 NOTE — DISCHARGE NOTE ADULT - MEDICATION SUMMARY - MEDICATIONS TO CHANGE
I will SWITCH the dose or number of times a day I take the medications listed below when I get home from the hospital:    Lantus 100 units/mL subcutaneous solution  -- 8 unit(s) subcutaneous 2 times a day    lisinopril 5 mg oral tablet  -- 1 tab(s) by mouth once a day

## 2018-10-21 NOTE — DISCHARGE NOTE ADULT - CARE PLAN
Principal Discharge DX:	Acute on chronic respiratory failure with hypoxia and hypercapnia  Goal:	Resolved.  Assessment and plan of treatment:	C/w home Oxygen and medication as reconciled.   Follow up with your PMD and pulmonology.  Secondary Diagnosis:	COPD (chronic obstructive pulmonary disease)  Assessment and plan of treatment:	Plan as above.  Secondary Diagnosis:	Pulmonary HTN  Assessment and plan of treatment:	F/u with Pulmonology and cardiology.  Secondary Diagnosis:	Type 2 diabetes mellitus without complication, with long-term current use of insulin  Assessment and plan of treatment:	C/w medication as reconciled.  Secondary Diagnosis:	Hyperlipidemia, unspecified hyperlipidemia type  Assessment and plan of treatment:	C/w home medication. F/u with PMD.  Secondary Diagnosis:	CAD (coronary artery disease)  Assessment and plan of treatment:	C/w home medication and f/u with Cardiology.  Secondary Diagnosis:	Anxiety  Assessment and plan of treatment:	C/a medication as reconciled. Principal Discharge DX:	Acute on chronic respiratory failure with hypoxia and hypercapnia  Goal:	Resolved.  Assessment and plan of treatment:	C/w home Oxygen and medication as reconciled.   Follow up with your PMD and pulmonology.  Secondary Diagnosis:	COPD (chronic obstructive pulmonary disease)  Assessment and plan of treatment:	Plan as above.  Secondary Diagnosis:	Pulmonary HTN  Assessment and plan of treatment:	F/u with Pulmonology and cardiology.  Secondary Diagnosis:	Type 2 diabetes mellitus without complication, with long-term current use of insulin  Assessment and plan of treatment:	C/w medication as reconciled.  Secondary Diagnosis:	Hyperlipidemia, unspecified hyperlipidemia type  Assessment and plan of treatment:	C/w home medication. F/u with PMD.  Secondary Diagnosis:	CAD (coronary artery disease)  Assessment and plan of treatment:	C/w home medication and f/u with Cardiology.  Secondary Diagnosis:	Neck abscess  Assessment and plan of treatment:	Improved

## 2018-10-21 NOTE — DISCHARGE NOTE ADULT - HOSPITAL COURSE
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 2015/2016, who presented with complaints of worsening shortness of breath and congestion. Found to have a positive rhinovirus on viral panel. Admitted for COPD exacerbation / viral pneumonia. Responded to IV steroids, nebulizers and clinically improved. Evaluated by cardiology/ pulmonology and recommendation appreciated.   Off note- Pt noted to have jump in wbc, not septic, UA positive, neck with some erythema and lump, neck x-ray unremarkable. Pt started on PO Augmentin to cover possible UTI and skin.  Pt medically stable and pending level 2 clearance for TACO placement.    GENERAL: Pt lying comfortably, NAD.  Neck: Chronic lump in neck with some erythema,  CHEST/LUNG: Clear to auscultation bilaterally; No wheezing.  HEART: S1S2+, Regular rate and rhythm; No murmurs.  ABDOMEN: Soft, Nontender, Nondistended; Bowel sounds present.  Extremities: No LE edema, pulses+  NEURO: AAOX3, no focal deficits, no motor r sensory loss.  PSYCH: normal mood.    Total time spent on discharge 35 minutes. 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 2015/2016, who presented with complaints of worsening shortness of breath and congestion. Found to have a positive rhinovirus on viral panel. Admitted for COPD exacerbation / viral pneumonia. Responded to IV steroids, nebulizers and clinically improved. Evaluated by cardiology/ pulmonology and recommendation appreciated. Pt medically stable and pending level 2 clearance for TACO placement. Pt noted to have jump in wbc, not septic, UA positive, neck with some erythema and lump, neck xray unremarkable Pt started on PO Augmentin to cover possible UTI and skin.    Interval History:  Remains on O2 via NC.  Per SW, awaiting placement in TACO as pt unable to return to his living situation on O2, active smoker, poor insight to his condition.  He complained of lower posterior neck pain.   CT neck confirmed Posterior neck subcutaneouscomplex abscess.  Seen by general surgery, s/p I&D with additional exploration 10/24.  Antibiotics changed by ID, to be completed 10/29    Today denies complaints.  Feeling "much better".  No fever / chills.  No SOB.  Remains on O2.      >Neck Cellulitis with large abscess, s/p I&D - Continue Vancomycin, Ceftriaxone to be completed today per ID.    > Acute on chronic hypercapnic and hypoxic respiratory failure - Multifactorial (COPD Exacerbation, Pulmonary HTN, and Diastolic Heart Failure) - RVP is positive for Rhinovirus.  Completed steroids, continue O2 supplements (Home o2 dependent), encourage OOB. Smoking cessation emphasized. O2NC 2L now. Per SW/ [facility] Pt renting room and his landlord will not allow him on oxygen tank given active smoking.  Awaiting TACO placement  > DMII on long term insulin therapy - FS elevated.  Increase Lantus BID.  Continue Premeal insulin and insulin coverage.    > Smoker - Nicotine Patch. Smoking cessation counseled.   > Acute on chronic Diastolic CHF - C/w aldactone, Lisinopril. Metoprolol d/ilir due to pause by cardiology.   > COPD/ PHTN- Plan as above, O2 supplements, Prednisone taper, Duoneb. Pulmonology signed off.   > CAD and HLD - Continue Aspirin, Plavix, Lipitor. Metoprolol d/ilir by cardio as above.   > Anxiety / mood / Sleep disorder- Seen by Psych, started on Xanax/ Remeron.      Disposition: stable for discharge.  Outpatient followup as above.  Patient was advised to return if they experience any recurrence or worsening of symptoms.  Total time spent on discharge was 35 minutes.  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 2015/2016, who presented with complaints of worsening shortness of breath and congestion. Found to have a positive rhinovirus on viral panel. Admitted for COPD exacerbation / viral pneumonia. Responded to IV steroids, nebulizers and clinically improved. Evaluated by cardiology/ pulmonology and recommendation appreciated. Pt medically stable and pending level 2 clearance for TACO placement. Pt noted to have jump in wbc, not septic, UA positive, neck with some erythema and lump, neck xray unremarkable Pt started on PO Augmentin to cover possible UTI and skin.    Interval History:  Remains on O2 via NC.  Per , awaiting placement in TACO as pt unable to return to his living situation on O2, active smoker, poor insight to his condition.  He complained of lower posterior neck pain.   CT neck confirmed Posterior neck subcutaneouscomplex abscess.  Seen by general surgery, s/p I&D with additional exploration 10/24.  Antibiotics changed by ID, to be completed 10/29    Today denies complaints.  Feeling "much better".  No fever / chills.  No SOB.  Remains on O2.      >Neck Cellulitis with large abscess, s/p I&D - completed antibiotic course.    > Acute on chronic hypercapnic and hypoxic respiratory failure - Multifactorial (COPD Exacerbation, Pulmonary HTN, and Diastolic Heart Failure) - RVP is positive for Rhinovirus.  Completed steroids, continue O2 supplements (Home o2 dependent), encourage OOB. Smoking cessation emphasized. O2NC 2L now. Per SW/ CCM Pt renting room and his landlord will not allow him on oxygen tank given active smoking.  Being discharged to [facility].    > DMII on long term insulin therapy - FS elevated.  Increase Lantus BID.  Continue Premeal insulin and insulin coverage.      > Smoker - Nicotine Patch. Smoking cessation counseled.     > Acute on chronic Diastolic CHF - C/w aldactone, Lisinopril. Metoprolol d/ilir due to pause by cardiology.     > COPD/ PHTN- Plan as above, O2 supplements, Prednisone taper, Duoneb.      > CAD and HLD - Continue Aspirin, Plavix, Lipitor. Metoprolol d/ilir by cardio as above.     > Anxiety / mood / Sleep disorder- Seen by Psych, started on Xanax/ Remeron.      Disposition: stable for discharge.  Outpatient followup as above.  Patient was advised to return if they experience any recurrence or worsening of symptoms.  Total time spent on discharge was 35 minutes.

## 2018-10-21 NOTE — DISCHARGE NOTE ADULT - MEDICATION SUMMARY - MEDICATIONS TO STOP TAKING
I will STOP taking the medications listed below when I get home from the hospital:    glipiZIDE    metoprolol    theophylline 200 mg oral tablet, extended release  -- 1 tab(s) by mouth every 12 hours    Actos

## 2018-10-21 NOTE — PROGRESS NOTE ADULT - ASSESSMENT
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 2015/2016, who presented with complaints of worsening shortness of breath and congestion. Found to have a positive rhinovirus on viral panel. Admitted for COPD exacerbation / viral pneumonia. Responded to IV steroids, nebulizers and clinically improved. Evaluated by cardiology/ pulmonology and recommendation appreciated. Pt medically stable and pending level 2 clearance for TACO placement.  Off note- Pt noted to have jump in wbc, not septic, UA positive, neck with some erythema and lump, neck xray unremarkable Pt started on PO Augmentin to cover possible UTI and skin.    Plan:    > Acute on chronic hypercapnic and hypoxic respiratory failure   - Multifactorial (COPD Exacerbation, Pulmonary HTN, and Diastolic Heart Failure) - RVP is positive for Rhinovirus. Continue prednisone taper to be finish tomorrow, nebulizers, O2 supplements (Home o2 dependent), encourage OOB. Smoking cessation emphasized. O2NC 2L now. Per SW/ CCM Pt renting room and his landlord will not allow him on oxygen tank given active smoking- Pt initially refusing for TACO and now agreeable. Pending level 2 clearance for TACO. Pt made ALC.     >UTI- Procalcitonin/ lactate normal. On PO Augmentin started on 10/20. Follow urine cx and decide on abx continuation.     > Smoker - Nicotine Patch. Smoking cessation counseled.   > Acute on chronic Diastolic CHF - C/w aldactone, Lisinopril. Metoprolol d/ilir due to pause by cardiology.   > COPD/ PHTN- Plan as above, O2 supplements, Prednisone taper, Duoneb. Pulmonology signed off.   > CAD and HLD - Continue Aspirin, Plavix, Lipitor. Metoprolol d/ilir by cardio as above.   > DMII- C/w Lantus 70, Pre meal insulin 18, LSS + FS monitoring. Prednisone being tapered.   > Anxiety / mood / Sleep disorder- Seen by Psych, started on Xanax/ Remeron.    > DVT Prophylaxis -- Lovenox 40 mg  > Dispo- Medically stable. Pending level 2 clearance for TACO. 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 2015/2016, who presented with complaints of worsening shortness of breath and congestion. Found to have a positive rhinovirus on viral panel. Admitted for COPD exacerbation / viral pneumonia. Responded to IV steroids, nebulizers and clinically improved. Evaluated by cardiology/ pulmonology and recommendation appreciated. Pt medically stable and pending level 2 clearance for TACO placement.  Off note- Pt noted to have jump in wbc, not septic, UA positive, neck with some erythema and lump, neck xray unremarkable Pt started on PO Augmentin to cover possible UTI and skin.    Plan:    > Acute on chronic hypercapnic and hypoxic respiratory failure   - Multifactorial (COPD Exacerbation, Pulmonary HTN, and Diastolic Heart Failure) - RVP is positive for Rhinovirus. Continue prednisone taper to be finish tomorrow, nebulizers, O2 supplements (Home o2 dependent), encourage OOB. Smoking cessation emphasized. O2NC 2L now. Per SW/ CCM Pt renting room and his landlord will not allow him on oxygen tank given active smoking- Pt initially refusing for TACO and now agreeable. Pending level 2 clearance for TACO. Pt made ALC.     >UTI- Procalcitonin/ lactate normal. On PO Augmentin started on 10/20. Follow urine cx and decide on abx continuation.  >Neck Lump- Chronic on back of nec, with some erythema and pain. Started on Augmentin and Ibuprofen. Monitor if not improving may need bedside drainage.      > Smoker - Nicotine Patch. Smoking cessation counseled.   > Acute on chronic Diastolic CHF - C/w aldactone, Lisinopril. Metoprolol d/ilir due to pause by cardiology.   > COPD/ PHTN- Plan as above, O2 supplements, Prednisone taper, Duoneb. Pulmonology signed off.   > CAD and HLD - Continue Aspirin, Plavix, Lipitor. Metoprolol d/ilir by cardio as above.   > DMII- C/w Lantus 70, Pre meal insulin 18, LSS + FS monitoring. Prednisone being tapered.   > Anxiety / mood / Sleep disorder- Seen by Psych, started on Xanax/ Remeron.    > DVT Prophylaxis -- Lovenox 40 mg  > Dispo- Medically stable. Pending level 2 clearance for TACO.

## 2018-10-21 NOTE — DISCHARGE NOTE ADULT - SECONDARY DIAGNOSIS.
COPD (chronic obstructive pulmonary disease) Pulmonary HTN Type 2 diabetes mellitus without complication, with long-term current use of insulin Hyperlipidemia, unspecified hyperlipidemia type CAD (coronary artery disease) Anxiety Neck abscess

## 2018-10-22 LAB
GLUCOSE BLDC GLUCOMTR-MCNC: 158 MG/DL — HIGH (ref 70–99)
GLUCOSE BLDC GLUCOMTR-MCNC: 251 MG/DL — HIGH (ref 70–99)
GLUCOSE BLDC GLUCOMTR-MCNC: 296 MG/DL — HIGH (ref 70–99)
GLUCOSE BLDC GLUCOMTR-MCNC: 296 MG/DL — HIGH (ref 70–99)
HCT VFR BLD CALC: 41.2 % — LOW (ref 42–52)
HGB BLD-MCNC: 13.3 G/DL — LOW (ref 14–18)
MCHC RBC-ENTMCNC: 29.2 PG — SIGNIFICANT CHANGE UP (ref 27–31)
MCHC RBC-ENTMCNC: 32.3 G/DL — SIGNIFICANT CHANGE UP (ref 32–36)
MCV RBC AUTO: 90.4 FL — SIGNIFICANT CHANGE UP (ref 80–94)
PLATELET # BLD AUTO: 179 K/UL — SIGNIFICANT CHANGE UP (ref 150–400)
RBC # BLD: 4.56 M/UL — LOW (ref 4.6–6.2)
RBC # FLD: 14.9 % — SIGNIFICANT CHANGE UP (ref 11–15.6)
WBC # BLD: 14.2 K/UL — HIGH (ref 4.8–10.8)
WBC # FLD AUTO: 14.2 K/UL — HIGH (ref 4.8–10.8)

## 2018-10-22 PROCEDURE — 99232 SBSQ HOSP IP/OBS MODERATE 35: CPT

## 2018-10-22 RX ADMIN — Medication 500000 UNIT(S): at 05:13

## 2018-10-22 RX ADMIN — INSULIN GLARGINE 70 UNIT(S): 100 INJECTION, SOLUTION SUBCUTANEOUS at 22:36

## 2018-10-22 RX ADMIN — Medication 1 PATCH: at 12:22

## 2018-10-22 RX ADMIN — Medication 0.25 MILLIGRAM(S): at 22:36

## 2018-10-22 RX ADMIN — Medication 1 PATCH: at 12:23

## 2018-10-22 RX ADMIN — Medication 400 MILLIGRAM(S): at 06:14

## 2018-10-22 RX ADMIN — CLOPIDOGREL BISULFATE 75 MILLIGRAM(S): 75 TABLET, FILM COATED ORAL at 12:22

## 2018-10-22 RX ADMIN — SPIRONOLACTONE 12.5 MILLIGRAM(S): 25 TABLET, FILM COATED ORAL at 05:13

## 2018-10-22 RX ADMIN — Medication 1 PATCH: at 12:15

## 2018-10-22 RX ADMIN — LISINOPRIL 2.5 MILLIGRAM(S): 2.5 TABLET ORAL at 05:13

## 2018-10-22 RX ADMIN — Medication 18 UNIT(S): at 12:21

## 2018-10-22 RX ADMIN — Medication 400 MILLIGRAM(S): at 13:45

## 2018-10-22 RX ADMIN — Medication 400 MILLIGRAM(S): at 13:05

## 2018-10-22 RX ADMIN — Medication 2: at 12:21

## 2018-10-22 RX ADMIN — PANTOPRAZOLE SODIUM 40 MILLIGRAM(S): 20 TABLET, DELAYED RELEASE ORAL at 05:13

## 2018-10-22 RX ADMIN — ENOXAPARIN SODIUM 40 MILLIGRAM(S): 100 INJECTION SUBCUTANEOUS at 12:23

## 2018-10-22 RX ADMIN — Medication 500000 UNIT(S): at 12:22

## 2018-10-22 RX ADMIN — Medication 81 MILLIGRAM(S): at 12:22

## 2018-10-22 RX ADMIN — ATORVASTATIN CALCIUM 40 MILLIGRAM(S): 80 TABLET, FILM COATED ORAL at 22:37

## 2018-10-22 RX ADMIN — Medication 6: at 17:02

## 2018-10-22 RX ADMIN — Medication 1 TABLET(S): at 05:13

## 2018-10-22 RX ADMIN — Medication 3 MILLILITER(S): at 09:03

## 2018-10-22 RX ADMIN — Medication 18 UNIT(S): at 17:02

## 2018-10-22 RX ADMIN — Medication 500000 UNIT(S): at 17:02

## 2018-10-22 RX ADMIN — Medication 400 MILLIGRAM(S): at 22:36

## 2018-10-22 RX ADMIN — Medication 650 MILLIGRAM(S): at 18:40

## 2018-10-22 RX ADMIN — Medication 3 MILLILITER(S): at 15:40

## 2018-10-22 RX ADMIN — Medication 250 MILLIGRAM(S): at 05:13

## 2018-10-22 RX ADMIN — Medication 250 MILLIGRAM(S): at 17:02

## 2018-10-22 RX ADMIN — Medication 650 MILLIGRAM(S): at 13:04

## 2018-10-22 RX ADMIN — Medication 18 UNIT(S): at 08:12

## 2018-10-22 RX ADMIN — Medication 6: at 22:36

## 2018-10-22 RX ADMIN — Medication 3 MILLILITER(S): at 03:41

## 2018-10-22 RX ADMIN — Medication 10 MILLIGRAM(S): at 05:13

## 2018-10-22 RX ADMIN — Medication 3 MILLILITER(S): at 21:27

## 2018-10-22 RX ADMIN — Medication 6: at 08:12

## 2018-10-22 RX ADMIN — Medication 1 TABLET(S): at 17:02

## 2018-10-22 RX ADMIN — Medication 650 MILLIGRAM(S): at 12:22

## 2018-10-22 RX ADMIN — Medication 400 MILLIGRAM(S): at 05:13

## 2018-10-22 RX ADMIN — Medication 650 MILLIGRAM(S): at 19:15

## 2018-10-22 RX ADMIN — Medication 1 PATCH: at 05:16

## 2018-10-22 RX ADMIN — Medication 400 MILLIGRAM(S): at 23:03

## 2018-10-22 NOTE — PROGRESS NOTE ADULT - SUBJECTIVE AND OBJECTIVE BOX
CARDIOLOGY PROGRESS NOTE   ([facility])                                                                                                        Subjective: laying in bed, comfortable, NAD< deneid dyspnea, CP    Vitals:  T(C): 36.9 (10-22-18 @ 04:27), Max: 36.9 (10-22-18 @ 04:27)  HR: 95 (10-22-18 @ 04:27) (74 - 95)  BP: 109/62 (10-22-18 @ 04:27) (99/67 - 112/69)  RR: 20 (10-22-18 @ 04:27) (20 - 20)  SpO2: 96% (10-22-18 @ 04:27) (95% - 97%)  Wt(kg): --  I&O's Summary    21 Oct 2018 07:01  -  22 Oct 2018 07:00  --------------------------------------------------------  IN: 1760 mL / OUT: 0 mL / NET: 1760 mL          PHYSICAL EXAM:  Appearance: Normal	  HEENT:   Atraumatic  Cardiovascular: Normal S1 S2, No JVD, No murmurs, No edema  Respiratory: Lungs clear to auscultation	  Gastrointestinal:  Soft, Non-tender, + BS	  Skin: No rashes, No ecchymoses, No cyanosis  Neurologic: Alert and awake, able to move extremities  Extremities: No edema      CURRENT MEDICATIONS:  lisinopril 2.5 milliGRAM(s) Oral daily  spironolactone 12.5 milliGRAM(s) Oral daily    amoxicillin  500 milliGRAM(s)/clavulanate 1 Tablet(s) Oral two times a day  nystatin    Suspension 947857 Unit(s) Oral four times a day    ALBUTerol    0.083% 2.5 milliGRAM(s) Nebulizer every 4 hours PRN  ALBUTerol/ipratropium for Nebulization 3 milliLiter(s) Nebulizer every 6 hours    acetaminophen   Tablet .. 650 milliGRAM(s) Oral every 6 hours PRN  ALPRAZolam 0.25 milliGRAM(s) Oral three times a day PRN  ibuprofen  Tablet. 400 milliGRAM(s) Oral every 8 hours  mirtazapine 7.5 milliGRAM(s) Oral at bedtime PRN    pantoprazole    Tablet 40 milliGRAM(s) Oral before breakfast    atorvastatin 40 milliGRAM(s) Oral at bedtime  dextrose 40% Gel 15 Gram(s) Oral once PRN  dextrose 50% Injectable 12.5 Gram(s) IV Push once  dextrose 50% Injectable 25 Gram(s) IV Push once  dextrose 50% Injectable 25 Gram(s) IV Push once  glucagon  Injectable 1 milliGRAM(s) IntraMuscular once PRN  insulin glargine Injectable (LANTUS) 70 Unit(s) SubCutaneous at bedtime  insulin lispro (HumaLOG) corrective regimen sliding scale   SubCutaneous Before meals and at bedtime  insulin lispro Injectable (HumaLOG) 18 Unit(s) SubCutaneous three times a day before meals  predniSONE   Tablet 10 milliGRAM(s) Oral daily    aspirin enteric coated 81 milliGRAM(s) Oral daily  clopidogrel Tablet 75 milliGRAM(s) Oral daily  dextrose 5%. 1000 milliLiter(s) IV Continuous <Continuous>  enoxaparin Injectable 40 milliGRAM(s) SubCutaneous daily      LABS:	 	                            13.6   14.6  )-----------( 174      ( 21 Oct 2018 08:11 )             43.1     10-21    138  |  98  |  24.0<H>  ----------------------------<  182<H>  4.6   |  30.0<H>  |  0.55    Ca    8.9      21 Oct 2018 08:11

## 2018-10-22 NOTE — PROGRESS NOTE ADULT - ASSESSMENT
59 yo M with pulmonary HTN, GERD, Bipolar disorder, HTN, HLD, daily tobacco smoker T2DM on insulin, CAD s/p PCI in 2016 (RCA?) by Dr. 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  Treated for  COPD exacerbation / viral pneumonia. Responded to IV steroids, nebulizers and clinically improving.     A/P  Chronic Diastolic Heart  failure  Echo Prelim: Hyperdynamic LV EF >75. RV severely enlarged. RV function reduced. RA severely enlarged Trace MR. Mod TR. PASP 56mmHg.  COPD; underlying stage 4 dz  pHTN  Stable, Cont meds.    Acute on chronic hypercapnic and hypoxic respiratory failure: Resolved    Rhinovirus infection: Resolved    Smoker: Cessation d/wept.      CARDIO MEDS  lisinopril 2.5 milliGRAM(s) Oral daily  spironolactone 12.5 milliGRAM(s) Oral daily  atorvastatin 40 milliGRAM(s) Oral at bedtime  aspirin enteric coated 81 milliGRAM(s) Oral daily  clopidogrel Tablet 75 milliGRAM(s) Oral daily  enoxaparin Injectable 40 milliGRAM(s) SubCutaneous daily          will s/o pls call with any questions

## 2018-10-22 NOTE — PROGRESS NOTE ADULT - ASSESSMENT
> Acute on chronic hypercapnic and hypoxic respiratory failure - Multifactorial (COPD Exacerbation, Pulmonary HTN, and Diastolic Heart Failure) - RVP is positive for Rhinovirus.  Steroid taper, O2 supplements (Home o2 dependent), encourage OOB. Smoking cessation emphasized. O2NC 2L now. Per SW/ CCM Pt renting room and his landlord will not allow him on oxygen tank given active smoking.  Awaiting [facility] placement  >UTI- Procalcitonin/ lactate normal. On PO Augmentin started on 10/20.  Continue current antibiotics.    >Neck Lump- Chronic on back of nec, with some erythema and pain.  ? cellulitis.  No fluctuance ot suggest abscess.  Will consider imaging if no improvement.   > DMII- C/w Lantus 70, Pre meal insulin 18, LSS + FS monitoring. Prednisone being tapered.   > Smoker - Nicotine Patch. Smoking cessation counseled.   > Acute on chronic Diastolic CHF - C/w aldactone, Lisinopril. Metoprolol d/ilir due to pause by cardiology.   > COPD/ PHTN- Plan as above, O2 supplements, Prednisone taper, Duoneb. Pulmonology signed off.   > CAD and HLD - Continue Aspirin, Plavix, Lipitor. Metoprolol d/ilir by cardio as above.   > Anxiety / mood / Sleep disorder- Seen by Psych, started on Xanax/ Remeron.    > DVT Prophylaxis -- Lovenox 40 mg  > Dispo- Medically stable. Pending level 2 clearance for [facility].

## 2018-10-22 NOTE — PROGRESS NOTE ADULT - SUBJECTIVE AND OBJECTIVE BOX
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 2015/2016, who presented with complaints of worsening shortness of breath and congestion. Found to have a positive rhinovirus on viral panel. Admitted for COPD exacerbation / viral pneumonia. Responded to IV steroids, nebulizers and clinically improved. Evaluated by cardiology/ pulmonology and recommendation appreciated. Pt medically stable and pending level 2 clearance for [facility] placement. Pt noted to have jump in wbc, not septic, UA positive, neck with some erythema and lump, neck xray unremarkable Pt started on PO Augmentin to cover possible UTI and skin.    Interval History:  Remains on O2 via NC.  Per SW, awaiting placement in [facility] as pt unable to return to his living situation on O2, active smoker, poor insight to his condition.  Pt denies complaints.  No SOB.  No chest pain / palpitations.     ____________________PHYSICAL EXAM:  Vitals reviewed as indicated below  GENERAL:  NAD Alert and Oriented x 3   HEENT: NCAT  CARDIOVASCULAR:  S1, S2  LUNGS: coarse BS b/l  ABDOMEN:  soft, (-) tenderness, (-) distension, (+) bowel sounds, (-) guarding, (-) rebound (-) rigidity  EXTREMITIES:  no cyanosis / clubbing / edema.   ____________________      BACKGROUND:  HEALTH ISSUES - PROBLEM Dx:  Anxiety  Pulmonary HTN: Pulmonary HTN  Bipolar 1 disorder: Bipolar 1 disorder  COPD (chronic obstructive pulmonary disease): COPD (chronic obstructive pulmonary disease)  Coronary artery disease involving native coronary artery of native heart, angina presence unspecified: Coronary artery disease involving native coronary artery of native heart, angina presence unspecified  Acute systolic CHF (congestive heart failure), NYHA class 2: Acute systolic CHF (congestive heart failure), NYHA class 2  Hyperlipidemia, unspecified hyperlipidemia type: Hyperlipidemia, unspecified hyperlipidemia type  Type 2 diabetes mellitus without complication, with long-term current use of insulin: Type 2 diabetes mellitus without complication, with long-term current use of insulin  Coronary artery disease involving native coronary artery of native heart without angina pectoris: Coronary artery disease involving native coronary artery of native heart without angina pectoris  Gastroesophageal reflux disease, esophagitis presence not specified: Gastroesophageal reflux disease, esophagitis presence not specified  Smoker: Smoker  Acute congestive heart failure, unspecified heart failure type: Acute congestive heart failure, unspecified heart failure type  Acute on chronic respiratory failure with hypoxia and hypercapnia: Acute on chronic respiratory failure with hypoxia and hypercapnia        Allergies    No Known Allergies    Intolerances      PAST MEDICAL & SURGICAL HISTORY:  Oxygen dependent  Gastroesophageal reflux disease, esophagitis presence not specified  Smoker  Bipolar 1 disorder  Coronary artery disease involving native coronary artery of native heart without angina pectoris  Type 2 diabetes mellitus without complication, with long-term current use of insulin  Pulmonary HTN  HLD (hyperlipidemia)  Stented coronary artery  COPD (chronic obstructive pulmonary disease)  No significant past surgical history        VITALS:  Vital Signs Last 24 Hrs  T(C): 36.9 (22 Oct 2018 09:31), Max: 36.9 (22 Oct 2018 04:27)  T(F): 98.4 (22 Oct 2018 09:31), Max: 98.4 (22 Oct 2018 04:27)  HR: 90 (22 Oct 2018 09:31) (74 - 95)  BP: 92/60 (22 Oct 2018 09:31) (92/60 - 112/69)  BP(mean): --  RR: 16 (22 Oct 2018 09:31) (16 - 20)  SpO2: 93% (22 Oct 2018 09:31) (93% - 97%) Daily     Daily   CAPILLARY BLOOD GLUCOSE      POCT Blood Glucose.: 158 mg/dL (22 Oct 2018 11:39)  POCT Blood Glucose.: 251 mg/dL (22 Oct 2018 08:11)  POCT Blood Glucose.: 246 mg/dL (21 Oct 2018 21:45)  POCT Blood Glucose.: 170 mg/dL (21 Oct 2018 16:32)    I&O's Summary    21 Oct 2018 07:01  -  22 Oct 2018 07:00  --------------------------------------------------------  IN: 1760 mL / OUT: 0 mL / NET: 1760 mL    22 Oct 2018 07:01  -  22 Oct 2018 15:23  --------------------------------------------------------  IN: 720 mL / OUT: 3 mL / NET: 717 mL          LABS:                        13.3   14.2  )-----------( 179      ( 22 Oct 2018 08:42 )             41.2     10-21    138  |  98  |  24.0<H>  ----------------------------<  182<H>  4.6   |  30.0<H>  |  0.55    Ca    8.9      21 Oct 2018 08:11                    MEDICATIONS:  MEDICATIONS  (STANDING):  ALBUTerol/ipratropium for Nebulization 3 milliLiter(s) Nebulizer every 6 hours  amoxicillin  500 milliGRAM(s)/clavulanate 1 Tablet(s) Oral two times a day  aspirin enteric coated 81 milliGRAM(s) Oral daily  atorvastatin 40 milliGRAM(s) Oral at bedtime  clopidogrel Tablet 75 milliGRAM(s) Oral daily  dextrose 5%. 1000 milliLiter(s) (50 mL/Hr) IV Continuous <Continuous>  dextrose 50% Injectable 12.5 Gram(s) IV Push once  dextrose 50% Injectable 25 Gram(s) IV Push once  dextrose 50% Injectable 25 Gram(s) IV Push once  enoxaparin Injectable 40 milliGRAM(s) SubCutaneous daily  ibuprofen  Tablet. 400 milliGRAM(s) Oral every 8 hours  insulin glargine Injectable (LANTUS) 70 Unit(s) SubCutaneous at bedtime  insulin lispro (HumaLOG) corrective regimen sliding scale   SubCutaneous Before meals and at bedtime  insulin lispro Injectable (HumaLOG) 18 Unit(s) SubCutaneous three times a day before meals  lisinopril 2.5 milliGRAM(s) Oral daily  nicotine - 21 mG/24Hr(s) Patch 1 patch Transdermal daily  nystatin    Suspension 605502 Unit(s) Oral four times a day  pantoprazole    Tablet 40 milliGRAM(s) Oral before breakfast  predniSONE   Tablet 10 milliGRAM(s) Oral daily  saccharomyces boulardii 250 milliGRAM(s) Oral two times a day  spironolactone 12.5 milliGRAM(s) Oral daily    MEDICATIONS  (PRN):  acetaminophen   Tablet .. 650 milliGRAM(s) Oral every 6 hours PRN Temp greater or equal to 38C (100.4F), Moderate Pain (4 - 6)  ALBUTerol    0.083% 2.5 milliGRAM(s) Nebulizer every 4 hours PRN Shortness of Breath and/or Wheezing  ALPRAZolam 0.25 milliGRAM(s) Oral three times a day PRN Anxiety/ Agitation  dextrose 40% Gel 15 Gram(s) Oral once PRN Blood Glucose LESS THAN 70 milliGRAM(s)/deciliter  glucagon  Injectable 1 milliGRAM(s) IntraMuscular once PRN Glucose LESS THAN 70 milligrams/deciliter  mirtazapine 7.5 milliGRAM(s) Oral at bedtime PRN Sleep

## 2018-10-23 LAB
GLUCOSE BLDC GLUCOMTR-MCNC: 125 MG/DL — HIGH (ref 70–99)
GLUCOSE BLDC GLUCOMTR-MCNC: 154 MG/DL — HIGH (ref 70–99)
GLUCOSE BLDC GLUCOMTR-MCNC: 170 MG/DL — HIGH (ref 70–99)
GLUCOSE BLDC GLUCOMTR-MCNC: 241 MG/DL — HIGH (ref 70–99)

## 2018-10-23 PROCEDURE — 70491 CT SOFT TISSUE NECK W/DYE: CPT | Mod: 26

## 2018-10-23 PROCEDURE — 99233 SBSQ HOSP IP/OBS HIGH 50: CPT

## 2018-10-23 RX ORDER — OXYCODONE AND ACETAMINOPHEN 5; 325 MG/1; MG/1
2 TABLET ORAL EVERY 6 HOURS
Qty: 0 | Refills: 0 | Status: DISCONTINUED | OUTPATIENT
Start: 2018-10-23 | End: 2018-10-30

## 2018-10-23 RX ORDER — VANCOMYCIN HCL 1 G
1250 VIAL (EA) INTRAVENOUS ONCE
Qty: 0 | Refills: 0 | Status: COMPLETED | OUTPATIENT
Start: 2018-10-23 | End: 2018-10-23

## 2018-10-23 RX ORDER — VANCOMYCIN HCL 1 G
1250 VIAL (EA) INTRAVENOUS EVERY 8 HOURS
Qty: 0 | Refills: 0 | Status: COMPLETED | OUTPATIENT
Start: 2018-10-24 | End: 2018-10-29

## 2018-10-23 RX ORDER — OXYCODONE AND ACETAMINOPHEN 5; 325 MG/1; MG/1
1 TABLET ORAL EVERY 6 HOURS
Qty: 0 | Refills: 0 | Status: DISCONTINUED | OUTPATIENT
Start: 2018-10-23 | End: 2018-10-28

## 2018-10-23 RX ORDER — VANCOMYCIN HCL 1 G
VIAL (EA) INTRAVENOUS
Qty: 0 | Refills: 0 | Status: DISCONTINUED | OUTPATIENT
Start: 2018-10-23 | End: 2018-10-23

## 2018-10-23 RX ORDER — OXYCODONE HYDROCHLORIDE 5 MG/1
5 TABLET ORAL ONCE
Qty: 0 | Refills: 0 | Status: DISCONTINUED | OUTPATIENT
Start: 2018-10-23 | End: 2018-10-23

## 2018-10-23 RX ORDER — VANCOMYCIN HCL 1 G
VIAL (EA) INTRAVENOUS
Qty: 0 | Refills: 0 | Status: COMPLETED | OUTPATIENT
Start: 2018-10-23 | End: 2018-10-29

## 2018-10-23 RX ADMIN — Medication 650 MILLIGRAM(S): at 00:03

## 2018-10-23 RX ADMIN — Medication 1 PATCH: at 11:46

## 2018-10-23 RX ADMIN — OXYCODONE AND ACETAMINOPHEN 1 TABLET(S): 5; 325 TABLET ORAL at 12:46

## 2018-10-23 RX ADMIN — Medication 18 UNIT(S): at 11:47

## 2018-10-23 RX ADMIN — Medication 1 TABLET(S): at 17:46

## 2018-10-23 RX ADMIN — Medication 18 UNIT(S): at 17:46

## 2018-10-23 RX ADMIN — INSULIN GLARGINE 70 UNIT(S): 100 INJECTION, SOLUTION SUBCUTANEOUS at 21:55

## 2018-10-23 RX ADMIN — OXYCODONE AND ACETAMINOPHEN 2 TABLET(S): 5; 325 TABLET ORAL at 17:47

## 2018-10-23 RX ADMIN — Medication 3 MILLILITER(S): at 09:04

## 2018-10-23 RX ADMIN — CLOPIDOGREL BISULFATE 75 MILLIGRAM(S): 75 TABLET, FILM COATED ORAL at 11:46

## 2018-10-23 RX ADMIN — Medication 3 MILLILITER(S): at 14:53

## 2018-10-23 RX ADMIN — Medication 1 TABLET(S): at 06:40

## 2018-10-23 RX ADMIN — Medication 1 PATCH: at 07:00

## 2018-10-23 RX ADMIN — Medication 2: at 11:47

## 2018-10-23 RX ADMIN — Medication 500000 UNIT(S): at 17:46

## 2018-10-23 RX ADMIN — OXYCODONE HYDROCHLORIDE 5 MILLIGRAM(S): 5 TABLET ORAL at 00:58

## 2018-10-23 RX ADMIN — Medication 1 PATCH: at 22:35

## 2018-10-23 RX ADMIN — OXYCODONE AND ACETAMINOPHEN 2 TABLET(S): 5; 325 TABLET ORAL at 19:02

## 2018-10-23 RX ADMIN — ATORVASTATIN CALCIUM 40 MILLIGRAM(S): 80 TABLET, FILM COATED ORAL at 21:56

## 2018-10-23 RX ADMIN — ENOXAPARIN SODIUM 40 MILLIGRAM(S): 100 INJECTION SUBCUTANEOUS at 11:46

## 2018-10-23 RX ADMIN — Medication 400 MILLIGRAM(S): at 07:10

## 2018-10-23 RX ADMIN — Medication 10 MILLIGRAM(S): at 06:40

## 2018-10-23 RX ADMIN — Medication 400 MILLIGRAM(S): at 06:40

## 2018-10-23 RX ADMIN — Medication 0: at 21:57

## 2018-10-23 RX ADMIN — Medication 500000 UNIT(S): at 00:29

## 2018-10-23 RX ADMIN — OXYCODONE HYDROCHLORIDE 5 MILLIGRAM(S): 5 TABLET ORAL at 00:28

## 2018-10-23 RX ADMIN — Medication 250 MILLIGRAM(S): at 06:41

## 2018-10-23 RX ADMIN — Medication 250 MILLIGRAM(S): at 17:46

## 2018-10-23 RX ADMIN — Medication 81 MILLIGRAM(S): at 11:46

## 2018-10-23 RX ADMIN — OXYCODONE AND ACETAMINOPHEN 1 TABLET(S): 5; 325 TABLET ORAL at 11:46

## 2018-10-23 RX ADMIN — Medication 3 MILLILITER(S): at 21:05

## 2018-10-23 RX ADMIN — PANTOPRAZOLE SODIUM 40 MILLIGRAM(S): 20 TABLET, DELAYED RELEASE ORAL at 06:40

## 2018-10-23 RX ADMIN — OXYCODONE AND ACETAMINOPHEN 1 TABLET(S): 5; 325 TABLET ORAL at 22:25

## 2018-10-23 RX ADMIN — Medication 2: at 17:46

## 2018-10-23 RX ADMIN — Medication 650 MILLIGRAM(S): at 00:34

## 2018-10-23 RX ADMIN — Medication 500000 UNIT(S): at 11:46

## 2018-10-23 RX ADMIN — Medication 18 UNIT(S): at 08:20

## 2018-10-23 RX ADMIN — Medication 4: at 08:20

## 2018-10-23 RX ADMIN — Medication 3 MILLILITER(S): at 04:02

## 2018-10-23 RX ADMIN — Medication 500000 UNIT(S): at 06:40

## 2018-10-23 RX ADMIN — Medication 166.67 MILLIGRAM(S): at 22:32

## 2018-10-23 RX ADMIN — Medication 0.25 MILLIGRAM(S): at 22:24

## 2018-10-23 NOTE — CONSULT NOTE ADULT - SUBJECTIVE AND OBJECTIVE BOX
SUBJECTIVE: 57 yo male who presented to the hospital with complaints of worsening shortness of breath and congestion. Found to have a positive rhinovirus on viral panel. Admitted for COPD exacerbation/viral pneumonia. Responded to IV steroids, nebulizers and clinically improved. Evaluated by cardiology/ pulmonology while in the hospital. Pt medically stable and pending dispo for [facility] placement, however, pt was found to have a bump in his wbc. Patient was complaining of neck pain. CT scan neck was done which revealed a large fluid collection in the posterior neck consistent with an abscess. Surgery was consulted for incision and drainage procedure.    Pmhx: Pulmonary HTN, GERD, Bipolar disorder, HTN, HLD, DM-II on insulin, COPD/Emphyema, CHF, CAD s/p PCI.      MEDICATIONS  (STANDING):  ALBUTerol/ipratropium for Nebulization 3 milliLiter(s) Nebulizer every 6 hours  aspirin enteric coated 81 milliGRAM(s) Oral daily  atorvastatin 40 milliGRAM(s) Oral at bedtime  clopidogrel Tablet 75 milliGRAM(s) Oral daily  dextrose 5%. 1000 milliLiter(s) (50 mL/Hr) IV Continuous <Continuous>  dextrose 50% Injectable 12.5 Gram(s) IV Push once  dextrose 50% Injectable 25 Gram(s) IV Push once  dextrose 50% Injectable 25 Gram(s) IV Push once  enoxaparin Injectable 40 milliGRAM(s) SubCutaneous daily  insulin glargine Injectable (LANTUS) 70 Unit(s) SubCutaneous at bedtime  insulin lispro (HumaLOG) corrective regimen sliding scale   SubCutaneous Before meals and at bedtime  insulin lispro Injectable (HumaLOG) 18 Unit(s) SubCutaneous three times a day before meals  levoFLOXacin IVPB      levoFLOXacin IVPB 500 milliGRAM(s) IV Intermittent once  lisinopril 2.5 milliGRAM(s) Oral daily  nicotine - 21 mG/24Hr(s) Patch 1 patch Transdermal daily  nystatin    Suspension 392093 Unit(s) Oral four times a day  pantoprazole    Tablet 40 milliGRAM(s) Oral before breakfast  saccharomyces boulardii 250 milliGRAM(s) Oral two times a day  spironolactone 12.5 milliGRAM(s) Oral daily  vancomycin  IVPB        MEDICATIONS  (PRN):  acetaminophen   Tablet .. 650 milliGRAM(s) Oral every 6 hours PRN Temp greater or equal to 38C (100.4F), Moderate Pain (4 - 6)  ALBUTerol    0.083% 2.5 milliGRAM(s) Nebulizer every 4 hours PRN Shortness of Breath and/or Wheezing  ALPRAZolam 0.25 milliGRAM(s) Oral three times a day PRN Anxiety/ Agitation  dextrose 40% Gel 15 Gram(s) Oral once PRN Blood Glucose LESS THAN 70 milliGRAM(s)/deciliter  glucagon  Injectable 1 milliGRAM(s) IntraMuscular once PRN Glucose LESS THAN 70 milligrams/deciliter  mirtazapine 7.5 milliGRAM(s) Oral at bedtime PRN Sleep  oxyCODONE    5 mG/acetaminophen 325 mG 1 Tablet(s) Oral every 6 hours PRN mild - moderate pain  oxyCODONE    5 mG/acetaminophen 325 mG 2 Tablet(s) Oral every 6 hours PRN Severe Pain (7 - 10)      Vital Signs Last 24 Hrs  T(C): 36.9 (23 Oct 2018 16:37), Max: 36.9 (23 Oct 2018 16:37)  T(F): 98.5 (23 Oct 2018 16:37), Max: 98.5 (23 Oct 2018 16:37)  HR: 93 (23 Oct 2018 16:37) (89 - 100)  BP: 93/66 (23 Oct 2018 16:37) (93/66 - 110/67)  BP(mean): --  RR: 20 (23 Oct 2018 16:37) (18 - 20)  SpO2: --    PE  Gen: AOX3, NAD  Neck: 5 x 4 cm area of erythema/induration to posterior neck with palpable fluctuance, no active drainage, +Pain with palpation  Pulm: Non labored breathing  CV: RRR, S1 and S2   Abd: Soft, ND, NT  Ext: Moving all 4 extremities      I&O's Detail    22 Oct 2018 07:01  -  23 Oct 2018 07:00  --------------------------------------------------------  IN:    Oral Fluid: 1080 mL  Total IN: 1080 mL    OUT:    Voided: 3 mL  Total OUT: 3 mL    Total NET: 1077 mL      23 Oct 2018 07:01  -  23 Oct 2018 18:24  --------------------------------------------------------  IN:    Oral Fluid: 720 mL  Total IN: 720 mL    OUT:  Total OUT: 0 mL    Total NET: 720 mL          LABS:                        13.3   14.2  )-----------( 179      ( 22 Oct 2018 08:42 )             41.2                 RADIOLOGY & ADDITIONAL STUDIES:

## 2018-10-23 NOTE — PROGRESS NOTE ADULT - ASSESSMENT
> Acute on chronic hypercapnic and hypoxic respiratory failure - Multifactorial (COPD Exacerbation, Pulmonary HTN, and Diastolic Heart Failure) - RVP is positive for Rhinovirus.  Complete steroids, continue O2 supplements (Home o2 dependent), encourage OOB. Smoking cessation emphasized. O2NC 2L now. Per SW/ CCM Pt renting room and his landlord will not allow him on oxygen tank given active smoking.  Awaiting [facility] placement  >Neck Cellulitis, ? abscess - On Augmentin BID.  Persistent Leukocytosis.  CT Neck with contrast to evaluate for abscess.  Continue antibiotics.   > DMII- C/w Lantus 70, Pre meal insulin 18, LSS + FS monitoring. Prednisone being tapered.   > Smoker - Nicotine Patch. Smoking cessation counseled.   > Acute on chronic Diastolic CHF - C/w aldactone, Lisinopril. Metoprolol d/ilir due to pause by cardiology.   > COPD/ PHTN- Plan as above, O2 supplements, Prednisone taper, Duoneb. Pulmonology signed off.   > CAD and HLD - Continue Aspirin, Plavix, Lipitor. Metoprolol d/ilir by cardio as above.   > Anxiety / mood / Sleep disorder- Seen by Psych, started on Xanax/ Remeron.    > DVT Prophylaxis -- Lovenox 40 mg  > Dispo- Medically stable. Pending level 2 clearance for [facility].

## 2018-10-23 NOTE — CONSULT NOTE ADULT - ATTENDING COMMENTS
The patient was seen and examined  Details per the resident's note  Skin abscess--likely furuncle  Needs incision and drainage

## 2018-10-23 NOTE — CHART NOTE - NSCHARTNOTEFT_GEN_A_CORE
Source: Patient [ x]  Family [ ]   other [ ]    Current Diet: Consistent CHO, Dash      PO intake:  < 50% [ ]   50-75%  [ ]   %  [x ]  other :    Source for PO intake [x ] Patient [ ] family [ ] chart [ ] staff [ ] other    Enteral /Parenteral Nutrition:     Current Weight: 10/17 84.1, 10/23 89 kg    % Weight Change     Pertinent Medications: MEDICATIONS  (STANDING):  ALBUTerol/ipratropium for Nebulization 3 milliLiter(s) Nebulizer every 6 hours  amoxicillin  500 milliGRAM(s)/clavulanate 1 Tablet(s) Oral two times a day  aspirin enteric coated 81 milliGRAM(s) Oral daily  atorvastatin 40 milliGRAM(s) Oral at bedtime  clopidogrel Tablet 75 milliGRAM(s) Oral daily  dextrose 5%. 1000 milliLiter(s) (50 mL/Hr) IV Continuous <Continuous>  dextrose 50% Injectable 12.5 Gram(s) IV Push once  dextrose 50% Injectable 25 Gram(s) IV Push once  dextrose 50% Injectable 25 Gram(s) IV Push once  enoxaparin Injectable 40 milliGRAM(s) SubCutaneous daily  insulin glargine Injectable (LANTUS) 70 Unit(s) SubCutaneous at bedtime  insulin lispro (HumaLOG) corrective regimen sliding scale   SubCutaneous Before meals and at bedtime  insulin lispro Injectable (HumaLOG) 18 Unit(s) SubCutaneous three times a day before meals  lisinopril 2.5 milliGRAM(s) Oral daily  nicotine - 21 mG/24Hr(s) Patch 1 patch Transdermal daily  nystatin    Suspension 544104 Unit(s) Oral four times a day  pantoprazole    Tablet 40 milliGRAM(s) Oral before breakfast  predniSONE   Tablet 10 milliGRAM(s) Oral daily  saccharomyces boulardii 250 milliGRAM(s) Oral two times a day  spironolactone 12.5 milliGRAM(s) Oral daily    MEDICATIONS  (PRN):  acetaminophen   Tablet .. 650 milliGRAM(s) Oral every 6 hours PRN Temp greater or equal to 38C (100.4F), Moderate Pain (4 - 6)  ALBUTerol    0.083% 2.5 milliGRAM(s) Nebulizer every 4 hours PRN Shortness of Breath and/or Wheezing  ALPRAZolam 0.25 milliGRAM(s) Oral three times a day PRN Anxiety/ Agitation  dextrose 40% Gel 15 Gram(s) Oral once PRN Blood Glucose LESS THAN 70 milliGRAM(s)/deciliter  glucagon  Injectable 1 milliGRAM(s) IntraMuscular once PRN Glucose LESS THAN 70 milligrams/deciliter  mirtazapine 7.5 milliGRAM(s) Oral at bedtime PRN Sleep    Pertinent Labs: CBC Full  -  ( 22 Oct 2018 08:42 )  WBC Count : 14.2 K/uL  Hemoglobin : 13.3 g/dL  Hematocrit : 41.2 %  Platelet Count - Automated : 179 K/uL  Mean Cell Volume : 90.4 fl  Mean Cell Hemoglobin : 29.2 pg  Mean Cell Hemoglobin Concentration : 32.3 g/dL  Auto Neutrophil # : x  Auto Lymphocyte # : x  Auto Monocyte # : x  Auto Eosinophil # : x  Auto Basophil # : x  Auto Neutrophil % : x  Auto Lymphocyte % : x  Auto Monocyte % : x  Auto Eosinophil % : x  Auto Basophil % : x          Skin:     Nutrition focused physical exam conducted - found signs of malnutrition [x ]absent [ ]present    Subcutaneous fat loss: [ ] Orbital fat pads region, [ ]Buccal fat region, [ ]Triceps region,  [ ]Ribs region    Muscle wasting: [ ]Temples region, [ ]Clavicle region, [ ]Shoulder region, [ ]Scapula region, [ ]Interosseous region,  [ ]thigh region, [ ]Calf region    Estimated Needs:   [x ] no change since previous assessment  [ ] recalculated:     Current Nutrition Diagnosis: Pt remains at nutritional risk secondary to impaired nutrient utilization related to DM with heart failure as evidenced by elevated HgA1c (10.8%), Glucose, and FS readings. Pt has been F/u with DM education several times. Pt educated on HF diet recommendations during initial visit. Plan as of now for Pt to be DC to [facility]. Pt continues to eat well.       Recommendations: Continue to monitor    Monitoring and Evaluation:   [x ] PO intake [x ] Tolerance to diet prescription [X] Weights  [X] Follow up per protocol [X] Labs:

## 2018-10-23 NOTE — CHART NOTE - NSCHARTNOTEFT_GEN_A_CORE
CT Neck reviewed, showing Multiseptated loculated posterior cervical subcutaneous fluid collection   measuring 6.6 x 3.9 x 8.0 cm likely representing an abscess.    General Surgery consulted for drainage, culture and sensitivity.  Antibiotics escalated to Vancomycin, Levaquin pending culture results.

## 2018-10-23 NOTE — PROGRESS NOTE ADULT - SUBJECTIVE AND OBJECTIVE BOX
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 /, who presented with complaints of worsening shortness of breath and congestion. Found to have a positive rhinovirus on viral panel. Admitted for COPD exacerbation / viral pneumonia. Responded to IV steroids, nebulizers and clinically improved. Evaluated by cardiology/ pulmonology and recommendation appreciated. Pt medically stable and pending level 2 clearance for [facility] placement. Pt noted to have jump in wbc, not septic, UA positive, neck with some erythema and lump, neck xray unremarkable Pt started on PO Augmentin to cover possible UTI and skin.    Interval History:  Remains on O2 via NC.  Per SW, awaiting placement in [facility] as pt unable to return to his living situation on O2, active smoker, poor insight to his condition.  Today c/o pain in back of neck as site of localized swelling.    ____________________PHYSICAL EXAM:  Vitals reviewed as indicated below  GENERAL:  NAD Alert and Oriented x 3   HEENT: NCAT  CARDIOVASCULAR:  S1, S2  LUNGS: coarse BS b/l  ABDOMEN:  soft, (-) tenderness, (-) distension, (+) bowel sounds, (-) guarding, (-) rebound (-) rigidity  EXTREMITIES:  no cyanosis / clubbing / edema.   NECK: posterior lower neck erythema with area of fluctuance.   ____________________    VITALS:  Vital Signs Last 24 Hrs  T(C): 36.5 (23 Oct 2018 11:19), Max: 36.7 (22 Oct 2018 23:08)  T(F): 97.7 (23 Oct 2018 11:19), Max: 98.1 (22 Oct 2018 23:08)  HR: 100 (23 Oct 2018 11:19) (88 - 100)  BP: 109/69 (23 Oct 2018 11:19) (98/59 - 110/67)  BP(mean): --  RR: 18 (23 Oct 2018 11:19) (18 - 18)  SpO2: -- Daily     Daily Weight in k (23 Oct 2018 07:16)  CAPILLARY BLOOD GLUCOSE      POCT Blood Glucose.: 241 mg/dL (23 Oct 2018 07:53)  POCT Blood Glucose.: 296 mg/dL (22 Oct 2018 22:29)  POCT Blood Glucose.: 296 mg/dL (22 Oct 2018 16:59)    I&O's Summary    22 Oct 2018 07:01  -  23 Oct 2018 07:00  --------------------------------------------------------  IN: 1080 mL / OUT: 3 mL / NET: 1077 mL        LABS:                        13.3   14.2  )-----------( 179      ( 22 Oct 2018 08:42 )             41.2                       MEDICATIONS:  acetaminophen   Tablet .. 650 milliGRAM(s) Oral every 6 hours PRN  ALBUTerol    0.083% 2.5 milliGRAM(s) Nebulizer every 4 hours PRN  ALBUTerol/ipratropium for Nebulization 3 milliLiter(s) Nebulizer every 6 hours  ALPRAZolam 0.25 milliGRAM(s) Oral three times a day PRN  amoxicillin  500 milliGRAM(s)/clavulanate 1 Tablet(s) Oral two times a day  aspirin enteric coated 81 milliGRAM(s) Oral daily  atorvastatin 40 milliGRAM(s) Oral at bedtime  clopidogrel Tablet 75 milliGRAM(s) Oral daily  dextrose 40% Gel 15 Gram(s) Oral once PRN  dextrose 5%. 1000 milliLiter(s) IV Continuous <Continuous>  dextrose 50% Injectable 12.5 Gram(s) IV Push once  dextrose 50% Injectable 25 Gram(s) IV Push once  dextrose 50% Injectable 25 Gram(s) IV Push once  enoxaparin Injectable 40 milliGRAM(s) SubCutaneous daily  glucagon  Injectable 1 milliGRAM(s) IntraMuscular once PRN  insulin glargine Injectable (LANTUS) 70 Unit(s) SubCutaneous at bedtime  insulin lispro (HumaLOG) corrective regimen sliding scale   SubCutaneous Before meals and at bedtime  insulin lispro Injectable (HumaLOG) 18 Unit(s) SubCutaneous three times a day before meals  lisinopril 2.5 milliGRAM(s) Oral daily  mirtazapine 7.5 milliGRAM(s) Oral at bedtime PRN  nicotine - 21 mG/24Hr(s) Patch 1 patch Transdermal daily  nystatin    Suspension 704367 Unit(s) Oral four times a day  oxyCODONE    5 mG/acetaminophen 325 mG 1 Tablet(s) Oral every 6 hours PRN  oxyCODONE    5 mG/acetaminophen 325 mG 2 Tablet(s) Oral every 6 hours PRN  pantoprazole    Tablet 40 milliGRAM(s) Oral before breakfast  predniSONE   Tablet 10 milliGRAM(s) Oral daily  saccharomyces boulardii 250 milliGRAM(s) Oral two times a day  spironolactone 12.5 milliGRAM(s) Oral daily

## 2018-10-23 NOTE — CONSULT NOTE ADULT - ASSESSMENT
A/P: 57 yo M w/ CT neck revealing 6.6 x 3.9 x 8.0 cm fluid collection consistent with an abscess    Continue management per primary team  Recommend clindamycin for antibiotic treatment   Will obtain consent for bedside incision and drainage procedure  Cultures will be obtained  Please order CBC/BMP for tomorrow

## 2018-10-24 DIAGNOSIS — J44.1 CHRONIC OBSTRUCTIVE PULMONARY DISEASE WITH (ACUTE) EXACERBATION: ICD-10-CM

## 2018-10-24 DIAGNOSIS — L02.11 CUTANEOUS ABSCESS OF NECK: ICD-10-CM

## 2018-10-24 LAB
ANION GAP SERPL CALC-SCNC: 14 MMOL/L — SIGNIFICANT CHANGE UP (ref 5–17)
BUN SERPL-MCNC: 17 MG/DL — SIGNIFICANT CHANGE UP (ref 8–20)
CALCIUM SERPL-MCNC: 8.7 MG/DL — SIGNIFICANT CHANGE UP (ref 8.6–10.2)
CHLORIDE SERPL-SCNC: 95 MMOL/L — LOW (ref 98–107)
CO2 SERPL-SCNC: 28 MMOL/L — SIGNIFICANT CHANGE UP (ref 22–29)
CREAT SERPL-MCNC: 0.56 MG/DL — SIGNIFICANT CHANGE UP (ref 0.5–1.3)
GLUCOSE BLDC GLUCOMTR-MCNC: 264 MG/DL — HIGH (ref 70–99)
GLUCOSE BLDC GLUCOMTR-MCNC: 274 MG/DL — HIGH (ref 70–99)
GLUCOSE BLDC GLUCOMTR-MCNC: 293 MG/DL — HIGH (ref 70–99)
GLUCOSE BLDC GLUCOMTR-MCNC: 96 MG/DL — SIGNIFICANT CHANGE UP (ref 70–99)
GLUCOSE SERPL-MCNC: 325 MG/DL — HIGH (ref 70–115)
GRAM STN FLD: SIGNIFICANT CHANGE UP
HCT VFR BLD CALC: 39.9 % — LOW (ref 42–52)
HGB BLD-MCNC: 12.7 G/DL — LOW (ref 14–18)
MCHC RBC-ENTMCNC: 28.9 PG — SIGNIFICANT CHANGE UP (ref 27–31)
MCHC RBC-ENTMCNC: 31.8 G/DL — LOW (ref 32–36)
MCV RBC AUTO: 90.9 FL — SIGNIFICANT CHANGE UP (ref 80–94)
PLATELET # BLD AUTO: 160 K/UL — SIGNIFICANT CHANGE UP (ref 150–400)
POTASSIUM SERPL-MCNC: 4.1 MMOL/L — SIGNIFICANT CHANGE UP (ref 3.5–5.3)
POTASSIUM SERPL-SCNC: 4.1 MMOL/L — SIGNIFICANT CHANGE UP (ref 3.5–5.3)
RBC # BLD: 4.39 M/UL — LOW (ref 4.6–6.2)
RBC # FLD: 14.9 % — SIGNIFICANT CHANGE UP (ref 11–15.6)
SODIUM SERPL-SCNC: 137 MMOL/L — SIGNIFICANT CHANGE UP (ref 135–145)
SPECIMEN SOURCE: SIGNIFICANT CHANGE UP
WBC # BLD: 14.8 K/UL — HIGH (ref 4.8–10.8)
WBC # FLD AUTO: 14.8 K/UL — HIGH (ref 4.8–10.8)

## 2018-10-24 PROCEDURE — 99233 SBSQ HOSP IP/OBS HIGH 50: CPT

## 2018-10-24 PROCEDURE — 99223 1ST HOSP IP/OBS HIGH 75: CPT

## 2018-10-24 RX ORDER — OXYCODONE AND ACETAMINOPHEN 5; 325 MG/1; MG/1
1 TABLET ORAL ONCE
Qty: 0 | Refills: 0 | Status: DISCONTINUED | OUTPATIENT
Start: 2018-10-24 | End: 2018-10-24

## 2018-10-24 RX ORDER — CEFTRIAXONE 500 MG/1
1 INJECTION, POWDER, FOR SOLUTION INTRAMUSCULAR; INTRAVENOUS EVERY 24 HOURS
Qty: 0 | Refills: 0 | Status: DISCONTINUED | OUTPATIENT
Start: 2018-10-24 | End: 2018-10-28

## 2018-10-24 RX ORDER — MORPHINE SULFATE 50 MG/1
2 CAPSULE, EXTENDED RELEASE ORAL ONCE
Qty: 0 | Refills: 0 | Status: DISCONTINUED | OUTPATIENT
Start: 2018-10-24 | End: 2018-10-24

## 2018-10-24 RX ORDER — HYDROMORPHONE HYDROCHLORIDE 2 MG/ML
1 INJECTION INTRAMUSCULAR; INTRAVENOUS; SUBCUTANEOUS ONCE
Qty: 0 | Refills: 0 | Status: DISCONTINUED | OUTPATIENT
Start: 2018-10-24 | End: 2018-10-24

## 2018-10-24 RX ADMIN — MORPHINE SULFATE 2 MILLIGRAM(S): 50 CAPSULE, EXTENDED RELEASE ORAL at 15:50

## 2018-10-24 RX ADMIN — Medication 500000 UNIT(S): at 17:08

## 2018-10-24 RX ADMIN — Medication 6: at 08:22

## 2018-10-24 RX ADMIN — Medication 250 MILLIGRAM(S): at 17:09

## 2018-10-24 RX ADMIN — Medication 81 MILLIGRAM(S): at 12:30

## 2018-10-24 RX ADMIN — ENOXAPARIN SODIUM 40 MILLIGRAM(S): 100 INJECTION SUBCUTANEOUS at 12:31

## 2018-10-24 RX ADMIN — Medication 18 UNIT(S): at 08:22

## 2018-10-24 RX ADMIN — Medication 18 UNIT(S): at 17:08

## 2018-10-24 RX ADMIN — MIRTAZAPINE 7.5 MILLIGRAM(S): 45 TABLET, ORALLY DISINTEGRATING ORAL at 21:29

## 2018-10-24 RX ADMIN — Medication 166.67 MILLIGRAM(S): at 17:08

## 2018-10-24 RX ADMIN — Medication 500000 UNIT(S): at 12:31

## 2018-10-24 RX ADMIN — OXYCODONE AND ACETAMINOPHEN 2 TABLET(S): 5; 325 TABLET ORAL at 06:35

## 2018-10-24 RX ADMIN — OXYCODONE AND ACETAMINOPHEN 1 TABLET(S): 5; 325 TABLET ORAL at 00:02

## 2018-10-24 RX ADMIN — PANTOPRAZOLE SODIUM 40 MILLIGRAM(S): 20 TABLET, DELAYED RELEASE ORAL at 05:57

## 2018-10-24 RX ADMIN — Medication 500000 UNIT(S): at 01:24

## 2018-10-24 RX ADMIN — ATORVASTATIN CALCIUM 40 MILLIGRAM(S): 80 TABLET, FILM COATED ORAL at 21:29

## 2018-10-24 RX ADMIN — OXYCODONE AND ACETAMINOPHEN 1 TABLET(S): 5; 325 TABLET ORAL at 01:24

## 2018-10-24 RX ADMIN — Medication 1 PATCH: at 19:04

## 2018-10-24 RX ADMIN — Medication 1 PATCH: at 12:31

## 2018-10-24 RX ADMIN — Medication 250 MILLIGRAM(S): at 05:57

## 2018-10-24 RX ADMIN — Medication 3 MILLILITER(S): at 02:23

## 2018-10-24 RX ADMIN — HYDROMORPHONE HYDROCHLORIDE 1 MILLIGRAM(S): 2 INJECTION INTRAMUSCULAR; INTRAVENOUS; SUBCUTANEOUS at 00:45

## 2018-10-24 RX ADMIN — Medication 500000 UNIT(S): at 05:57

## 2018-10-24 RX ADMIN — LISINOPRIL 2.5 MILLIGRAM(S): 2.5 TABLET ORAL at 05:57

## 2018-10-24 RX ADMIN — HYDROMORPHONE HYDROCHLORIDE 1 MILLIGRAM(S): 2 INJECTION INTRAMUSCULAR; INTRAVENOUS; SUBCUTANEOUS at 00:14

## 2018-10-24 RX ADMIN — Medication 3 MILLILITER(S): at 20:27

## 2018-10-24 RX ADMIN — CLOPIDOGREL BISULFATE 75 MILLIGRAM(S): 75 TABLET, FILM COATED ORAL at 12:31

## 2018-10-24 RX ADMIN — Medication 6: at 21:28

## 2018-10-24 RX ADMIN — MORPHINE SULFATE 2 MILLIGRAM(S): 50 CAPSULE, EXTENDED RELEASE ORAL at 16:10

## 2018-10-24 RX ADMIN — INSULIN GLARGINE 70 UNIT(S): 100 INJECTION, SOLUTION SUBCUTANEOUS at 21:29

## 2018-10-24 RX ADMIN — Medication 6: at 17:08

## 2018-10-24 RX ADMIN — CEFTRIAXONE 100 GRAM(S): 500 INJECTION, POWDER, FOR SOLUTION INTRAMUSCULAR; INTRAVENOUS at 17:09

## 2018-10-24 RX ADMIN — OXYCODONE AND ACETAMINOPHEN 2 TABLET(S): 5; 325 TABLET ORAL at 13:30

## 2018-10-24 RX ADMIN — Medication 166.67 MILLIGRAM(S): at 08:23

## 2018-10-24 RX ADMIN — Medication 3 MILLILITER(S): at 15:33

## 2018-10-24 RX ADMIN — OXYCODONE AND ACETAMINOPHEN 2 TABLET(S): 5; 325 TABLET ORAL at 21:30

## 2018-10-24 RX ADMIN — Medication 0.25 MILLIGRAM(S): at 21:30

## 2018-10-24 RX ADMIN — Medication 3 MILLILITER(S): at 10:15

## 2018-10-24 RX ADMIN — OXYCODONE AND ACETAMINOPHEN 1 TABLET(S): 5; 325 TABLET ORAL at 05:54

## 2018-10-24 RX ADMIN — OXYCODONE AND ACETAMINOPHEN 2 TABLET(S): 5; 325 TABLET ORAL at 12:32

## 2018-10-24 RX ADMIN — Medication 1 PATCH: at 12:19

## 2018-10-24 RX ADMIN — Medication 1 PATCH: at 07:10

## 2018-10-24 NOTE — PROGRESS NOTE ADULT - SUBJECTIVE AND OBJECTIVE BOX
Patient reports ongoing pain of posterior neck. Erythema and tenderness and induration around small incision site which is packed with packing.      Plan to extend incision to ensure adequate drainage as CT scan shows fairly large size collection. Will plan to do this afternoon under local anesthestic. Continue abx per ID. Needs glucose control per Medicine.

## 2018-10-24 NOTE — PROCEDURE NOTE - PROCEDURE
<<-----Click on this checkbox to enter Procedure Incision and drainage abscess  10/24/2018    Active  Scot Craig

## 2018-10-24 NOTE — PROCEDURE NOTE - ADDITIONAL PROCEDURE DETAILS
Area was draped and prepared in sterile fashion. Local anesthesia was administered.  Incision was extended by 1cm on either side of the initial incision. The cavity was probed with the antonio clamp about 10 cm deep and an additional loculation was opened today. Cavity was then packed with 1inch iodoform packing strip, pressure was held with 4x4 for 5 minutes, pressure dressing was then applied and covered with ABD pad

## 2018-10-24 NOTE — CONSULT NOTE ADULT - PROBLEM SELECTOR PROBLEM 3
Chronic obstructive pulmonary disease with acute exacerbation
Hyperlipidemia, unspecified hyperlipidemia type

## 2018-10-24 NOTE — CONSULT NOTE ADULT - SUBJECTIVE AND OBJECTIVE BOX
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 Patient initially presented with SOB, congestion, Had positive RVP for entero/rhino virus. Patient was managed for COPD exacerbation. On IV steroids, nebulizers and on 5 days of Azithromycin. Patient noted to have a neck abscess. CT neck with subcutaneous abscess so was seen by surgery yesterday and ia s/p I&D. Started on Vancomycin and Levaquin. ID input requested.       Past Medical & Surgical Hx:  Oxygen dependent  Gastroesophageal reflux disease, esophagitis presence not specified  Smoker  Bipolar 1 disorder  Coronary artery disease involving native coronary artery of native heart without angina pectoris  Type 2 diabetes mellitus without complication, with long-term current use of insulin  Pulmonary HTN  HLD (hyperlipidemia)  Stented coronary artery  COPD (chronic obstructive pulmonary disease)  No significant past surgical history      Social Hx:  Active smoker      FAMILY HISTORY:  No family history of mental disorder (Father, Mother)  No family history of hypertension (Father, Mother)  No family history of chronic obstructive pulmonary disease (Father, Mother)  No family history of cardiovascular disease (Father, Mother)      Allergies  No Known Allergies      Antibiotics:  levoFLOXacin IVPB 500 milliGRAM(s) IV Intermittent every 24 hours  vancomycin  IVPB 1250 milliGRAM(s) IV Intermittent every 8 hours       REVIEW OF SYSTEMS:  CONSTITUTIONAL:  No Fever or chills  HEENT:  No diplopia or blurred vision.  No earache, sore throat or runny nose.  CARDIOVASCULAR:  No pressure, squeezing, strangling, tightness, heaviness or aching about the chest, neck, axilla or epigastrium.  RESPIRATORY:  No cough, shortness of breath  GASTROINTESTINAL:  No nausea, vomiting or diarrhea.  GENITOURINARY:  No dysuria, frequency or urgency. No Blood in urine  MUSCULOSKELETAL:  no joint aches, no muscle pain  SKIN:  No change in skin, hair or nails.  NEUROLOGIC:  No paresthesias, fasciculations, seizures or weakness.  PSYCHIATRIC:  No disorder of thought or mood.  ENDOCRINE:  No heat or cold intolerance  HEMATOLOGICAL:  No easy bruising or bleeding.       Physical Exam:  Vital Signs Last 24 Hrs  T(C): 36.8 (24 Oct 2018 11:39), Max: 36.9 (23 Oct 2018 16:37)  T(F): 98.3 (24 Oct 2018 11:39), Max: 98.5 (23 Oct 2018 16:37)  HR: 94 (24 Oct 2018 10:29) (82 - 94)  BP: 98/60 (24 Oct 2018 10:29) (93/66 - 104/66)  RR: 18 (24 Oct 2018 10:29) (18 - 20)  SpO2: 94% (24 Oct 2018 10:29) (93% - 97%)  Height (cm): 172.7 (10-23 @ 18:16)      GEN: NAD, pleasant  HEENT: normocephalic and atraumatic. EOMI. PERRL.    NECK: Supple.   LUNGS: Clear to auscultation.  HEART: Regular rate and rhythm without murmur.  ABDOMEN: Soft, nontender, and nondistended.  Positive bowel sounds.    : No CVA tenderness  EXTREMITIES: Without any edema.  MSK: No joint swelling  NEUROLOGIC: Cranial nerves II through XII are grossly intact.  PSYCHIATRIC: Appropriate affect .  SKIN: No ulceration or induration present.      Labs:  10-24    137  |  95<L>  |  17.0  ----------------------------<  325<H>  4.1   |  28.0  |  0.56    Ca    8.7      24 Oct 2018 09:25               12.7   14.8  )-----------( 160      ( 24 Oct 2018 09:25 )             39.9     Hemoglobin A1C, Whole Blood in AM (10.04.18 @ 06:42)    Hemoglobin A1C, Whole Blood: 10.8 %      RECENT CULTURES:  10-19 @ 18:43 .Urine Clean Catch (Midstream)     >100,000 CFU/ml Coag Negative Staphylococcus        EXAM:  CT NECK SOFT TISSUE IC                        PROCEDURE DATE:  10/23/2018    INTERPRETATION:  CLINICAL HISTORY: Posterior cervical abscess      COMPARISON: None.  TECHNIQUE: Postcontrast CT neck soft tissues. Multiple contiguousaxial   images through the CT neck soft tissues as well as multiplanar   reformatted images are submitted for interpretation with the   administration of 93 cc Omnipaque 300 intravenous contrast.   FINDINGS:  Multiseptated loculated posterior cervical subcutaneous fluid collection   measuring 6.6 x 3.9 x 8.0 cm likely representing an abscess. No evidence   of intraspinal extension or deep soft tissue extension.  Visualized intracranial structures are unremarkable. The visualized   orbits are unremarkable. Trace mucosal thickening in the ethmoid sinuses.   The mastoid air cells are hypoplastic with mild soft tissue opacification   of middle ear cavities bilaterally, left worse than right. No lymphadenopathy.  The nasopharyngeal soft tissues are symmetric. The parotid spaces,   parapharyngeal spaces,  spaces, and submandibular spaces are   symmetric. The visualized thyroid gland is unremarkable. The lung apices are clear.  IMPRESSION:      Posterior neck subcutaneouscomplex abscess, as above.

## 2018-10-24 NOTE — PROCEDURE NOTE - PROCEDURE
<<-----Click on this checkbox to enter Procedure Incision and drainage abscess  10/24/2018    Active  [email or web address]

## 2018-10-24 NOTE — PROGRESS NOTE ADULT - SUBJECTIVE AND OBJECTIVE BOX
Patient: DEVIKA CARBALLO [id number] 58y Male                           Internal Medicine Hospitalist Progress Note  Chief Complaint: Patient is a 58y old  Male who presents with a chief complaint of shortness of breath (22 Oct 2018 08:32)    Initial HPI:  Pt is 59 yo male with PMHx of pulmonary HTN, GERD, Bipolar disorder, HTN, HLD, current everyday 0.5 PPD smoker, DM-II on insulin, severe COPD/emphysema on home oxygen 2L, CHF with last exacerbation in  with CAD s/p PCI in [place] in ? /, who presented with complaints of worsening shortness of breath and congestion. Found to have a positive rhinovirus on viral panel. Admitted for COPD exacerbation / viral pneumonia. Responded to IV steroids, nebulizers and clinically improved. Evaluated by cardiology/ pulmonology and recommendation appreciated. Pt medically stable and pending level 2 clearance for TACO placement. Pt noted to have jump in wbc, not septic, UA positive, neck with some erythema and lump, neck xray unremarkable Pt started on PO Augmentin to cover possible UTI and skin.    Interval History:  Remains on O2 via NC.  Per SW, awaiting placement in [facility] as pt unable to return to his living situation on O2, active smoker, poor insight to his condition.  He complained of lower posterior neck pain.   CT neck confirmed Posterior neck subcutaneouscomplex abscess.  Seen by general surgery, s/p I&D.  Antibiotics changed to Vancomycin / Gentamicin.      Today, reports improvement in neck pain.  No fever/ chills.  No SOB.  No additional complaints.     ____________________PHYSICAL EXAM:  Vitals reviewed as indicated below  GENERAL:  NAD Alert and Oriented x 3   HEENT: NCAT  CARDIOVASCULAR:  S1, S2  LUNGS: coarse BS b/l  ABDOMEN:  soft, (-) tenderness, (-) distension, (+) bowel sounds, (-) guarding, (-) rebound (-) rigidity  EXTREMITIES:  no cyanosis / clubbing / edema.   NECK: posterior lower neck erythema, packing in place.     ____________________    VITALS:  Vital Signs Last 24 Hrs  T(C): 36.8 (24 Oct 2018 05:38), Max: 36.9 (23 Oct 2018 16:37)  T(F): 98.3 (24 Oct 2018 05:38), Max: 98.5 (23 Oct 2018 16:37)  HR: 94 (24 Oct 2018 10:29) (82 - 94)  BP: 98/60 (24 Oct 2018 10:29) (93/66 - 104/66)  BP(mean): --  RR: 18 (24 Oct 2018 10:29) (18 - 20)  SpO2: 94% (24 Oct 2018 10:29) (93% - 97%) Daily     Daily Weight in k.4 (23 Oct 2018 14:49)  CAPILLARY BLOOD GLUCOSE      POCT Blood Glucose.: 293 mg/dL (24 Oct 2018 08:20)  POCT Blood Glucose.: 125 mg/dL (23 Oct 2018 21:28)  POCT Blood Glucose.: 170 mg/dL (23 Oct 2018 17:19)  POCT Blood Glucose.: 154 mg/dL (23 Oct 2018 11:44)    I&O's Summary    23 Oct 2018 07:01  -  24 Oct 2018 07:00  --------------------------------------------------------  IN: 1160 mL / OUT: 200 mL / NET: 960 mL        LABS:                        12.7   14.8  )-----------( 160      ( 24 Oct 2018 09:25 )             39.9     10-24    137  |  95<L>  |  17.0  ----------------------------<  325<H>  4.1   |  28.0  |  0.56    Ca    8.7      24 Oct 2018 09:25                  MEDICATIONS:  acetaminophen   Tablet .. 650 milliGRAM(s) Oral every 6 hours PRN  ALBUTerol    0.083% 2.5 milliGRAM(s) Nebulizer every 4 hours PRN  ALBUTerol/ipratropium for Nebulization 3 milliLiter(s) Nebulizer every 6 hours  ALPRAZolam 0.25 milliGRAM(s) Oral three times a day PRN  aspirin enteric coated 81 milliGRAM(s) Oral daily  atorvastatin 40 milliGRAM(s) Oral at bedtime  clopidogrel Tablet 75 milliGRAM(s) Oral daily  dextrose 40% Gel 15 Gram(s) Oral once PRN  dextrose 5%. 1000 milliLiter(s) IV Continuous <Continuous>  dextrose 50% Injectable 12.5 Gram(s) IV Push once  dextrose 50% Injectable 25 Gram(s) IV Push once  dextrose 50% Injectable 25 Gram(s) IV Push once  enoxaparin Injectable 40 milliGRAM(s) SubCutaneous daily  glucagon  Injectable 1 milliGRAM(s) IntraMuscular once PRN  insulin glargine Injectable (LANTUS) 70 Unit(s) SubCutaneous at bedtime  insulin lispro (HumaLOG) corrective regimen sliding scale   SubCutaneous Before meals and at bedtime  insulin lispro Injectable (HumaLOG) 18 Unit(s) SubCutaneous three times a day before meals  levoFLOXacin IVPB      levoFLOXacin IVPB 500 milliGRAM(s) IV Intermittent every 24 hours  lidocaine 1%/EPINEPHrine 1:100,000 Inj 20 milliLiter(s) Local Injection once  lisinopril 2.5 milliGRAM(s) Oral daily  mirtazapine 7.5 milliGRAM(s) Oral at bedtime PRN  nicotine - 21 mG/24Hr(s) Patch 1 patch Transdermal daily  nystatin    Suspension 249196 Unit(s) Oral four times a day  oxyCODONE    5 mG/acetaminophen 325 mG 1 Tablet(s) Oral every 6 hours PRN  oxyCODONE    5 mG/acetaminophen 325 mG 2 Tablet(s) Oral every 6 hours PRN  pantoprazole    Tablet 40 milliGRAM(s) Oral before breakfast  saccharomyces boulardii 250 milliGRAM(s) Oral two times a day  spironolactone 12.5 milliGRAM(s) Oral daily  vancomycin  IVPB 1250 milliGRAM(s) IV Intermittent every 8 hours  vancomycin  IVPB

## 2018-10-24 NOTE — PROGRESS NOTE ADULT - ASSESSMENT
>Neck Cellulitis with large abscess, s/p I&D - Antibiotics changed to Vancomycin, Levaquin.  ID evaluation requested.  WBC stable.   > Acute on chronic hypercapnic and hypoxic respiratory failure - Multifactorial (COPD Exacerbation, Pulmonary HTN, and Diastolic Heart Failure) - RVP is positive for Rhinovirus.  Complete steroids, continue O2 supplements (Home o2 dependent), encourage OOB. Smoking cessation emphasized. O2NC 2L now. Per SW/ CCM Pt renting room and his landlord will not allow him on oxygen tank given active smoking.  Awaiting TACO placement  > DMII- C/w Lantus 70, Pre meal insulin 18, LSS + FS monitoring.  Off prednisone.     > Smoker - Nicotine Patch. Smoking cessation counseled.   > Acute on chronic Diastolic CHF - C/w aldactone, Lisinopril. Metoprolol d/ilir due to pause by cardiology.   > COPD/ PHTN- Plan as above, O2 supplements, Prednisone taper, Duoneb. Pulmonology signed off.   > CAD and HLD - Continue Aspirin, Plavix, Lipitor. Metoprolol d/ilir by cardio as above.   > Anxiety / mood / Sleep disorder- Seen by Psych, started on Xanax/ Remeron.    > DVT Prophylaxis -- Lovenox 40 mg  > Dispo- Holding discharge pending improvement in ID issues.

## 2018-10-24 NOTE — CONSULT NOTE ADULT - ASSESSMENT
59 y/o M with neck abscess s/p I&D 10/24/18    Neck Abscess s/p I&D 10/24/18  s/p COPD exacerbation  s/p Viral pneumonia  DM type 2, Uncontrolled: HbA1c 10.8%    - CT neck with subcutaneous abscess  - s/p I&D 10/24/18  - Cultures not sent  - No fevers   - Was on steroids till 10/23/18 and has uncontrolled DM type 2   - Will trend leukocytosis  - Continue Vancomycin  - Will monitor trough  - D/C Levofloxacin  - Start Ceftriaxone 1gm IV q 24hours    Will Follow

## 2018-10-24 NOTE — CONSULT NOTE ADULT - PROBLEM SELECTOR PROBLEM 2
Type 2 diabetes mellitus without complication, with long-term current use of insulin
Acute on chronic respiratory failure with hypoxia and hypercapnia

## 2018-10-25 LAB
GLUCOSE BLDC GLUCOMTR-MCNC: 101 MG/DL — HIGH (ref 70–99)
GLUCOSE BLDC GLUCOMTR-MCNC: 118 MG/DL — HIGH (ref 70–99)
GLUCOSE BLDC GLUCOMTR-MCNC: 132 MG/DL — HIGH (ref 70–99)
GLUCOSE BLDC GLUCOMTR-MCNC: 137 MG/DL — HIGH (ref 70–99)
GLUCOSE BLDC GLUCOMTR-MCNC: 233 MG/DL — HIGH (ref 70–99)
HCT VFR BLD CALC: 40.4 % — LOW (ref 42–52)
HGB BLD-MCNC: 12.9 G/DL — LOW (ref 14–18)
MCHC RBC-ENTMCNC: 29.5 PG — SIGNIFICANT CHANGE UP (ref 27–31)
MCHC RBC-ENTMCNC: 31.9 G/DL — LOW (ref 32–36)
MCV RBC AUTO: 92.2 FL — SIGNIFICANT CHANGE UP (ref 80–94)
PLATELET # BLD AUTO: 143 K/UL — LOW (ref 150–400)
RBC # BLD: 4.38 M/UL — LOW (ref 4.6–6.2)
RBC # FLD: 14.9 % — SIGNIFICANT CHANGE UP (ref 11–15.6)
VANCOMYCIN TROUGH SERPL-MCNC: 12.9 UG/ML — SIGNIFICANT CHANGE UP (ref 10–20)
WBC # BLD: 9 K/UL — SIGNIFICANT CHANGE UP (ref 4.8–10.8)
WBC # FLD AUTO: 9 K/UL — SIGNIFICANT CHANGE UP (ref 4.8–10.8)

## 2018-10-25 PROCEDURE — 99232 SBSQ HOSP IP/OBS MODERATE 35: CPT

## 2018-10-25 PROCEDURE — 99233 SBSQ HOSP IP/OBS HIGH 50: CPT

## 2018-10-25 RX ORDER — INSULIN GLARGINE 100 [IU]/ML
40 INJECTION, SOLUTION SUBCUTANEOUS
Qty: 0 | Refills: 0 | Status: DISCONTINUED | OUTPATIENT
Start: 2018-10-25 | End: 2018-11-01

## 2018-10-25 RX ORDER — INSULIN LISPRO 100/ML
10 VIAL (ML) SUBCUTANEOUS
Qty: 0 | Refills: 0 | Status: DISCONTINUED | OUTPATIENT
Start: 2018-10-25 | End: 2018-11-01

## 2018-10-25 RX ADMIN — Medication 500000 UNIT(S): at 12:35

## 2018-10-25 RX ADMIN — Medication 18 UNIT(S): at 08:13

## 2018-10-25 RX ADMIN — Medication 3 MILLILITER(S): at 15:26

## 2018-10-25 RX ADMIN — OXYCODONE AND ACETAMINOPHEN 2 TABLET(S): 5; 325 TABLET ORAL at 08:13

## 2018-10-25 RX ADMIN — Medication 250 MILLIGRAM(S): at 06:26

## 2018-10-25 RX ADMIN — Medication 3 MILLILITER(S): at 20:44

## 2018-10-25 RX ADMIN — Medication 650 MILLIGRAM(S): at 22:32

## 2018-10-25 RX ADMIN — Medication 1 PATCH: at 12:24

## 2018-10-25 RX ADMIN — Medication 250 MILLIGRAM(S): at 17:56

## 2018-10-25 RX ADMIN — Medication 3 MILLILITER(S): at 03:35

## 2018-10-25 RX ADMIN — Medication 1 PATCH: at 11:27

## 2018-10-25 RX ADMIN — CLOPIDOGREL BISULFATE 75 MILLIGRAM(S): 75 TABLET, FILM COATED ORAL at 11:27

## 2018-10-25 RX ADMIN — OXYCODONE AND ACETAMINOPHEN 2 TABLET(S): 5; 325 TABLET ORAL at 18:05

## 2018-10-25 RX ADMIN — ENOXAPARIN SODIUM 40 MILLIGRAM(S): 100 INJECTION SUBCUTANEOUS at 11:27

## 2018-10-25 RX ADMIN — OXYCODONE AND ACETAMINOPHEN 2 TABLET(S): 5; 325 TABLET ORAL at 03:48

## 2018-10-25 RX ADMIN — Medication 4: at 08:14

## 2018-10-25 RX ADMIN — CEFTRIAXONE 100 GRAM(S): 500 INJECTION, POWDER, FOR SOLUTION INTRAMUSCULAR; INTRAVENOUS at 17:56

## 2018-10-25 RX ADMIN — LISINOPRIL 2.5 MILLIGRAM(S): 2.5 TABLET ORAL at 06:26

## 2018-10-25 RX ADMIN — INSULIN GLARGINE 40 UNIT(S): 100 INJECTION, SOLUTION SUBCUTANEOUS at 22:30

## 2018-10-25 RX ADMIN — Medication 3 MILLILITER(S): at 08:47

## 2018-10-25 RX ADMIN — Medication 18 UNIT(S): at 12:43

## 2018-10-25 RX ADMIN — OXYCODONE AND ACETAMINOPHEN 2 TABLET(S): 5; 325 TABLET ORAL at 19:00

## 2018-10-25 RX ADMIN — Medication 166.67 MILLIGRAM(S): at 11:32

## 2018-10-25 RX ADMIN — Medication 650 MILLIGRAM(S): at 23:09

## 2018-10-25 RX ADMIN — SPIRONOLACTONE 12.5 MILLIGRAM(S): 25 TABLET, FILM COATED ORAL at 06:26

## 2018-10-25 RX ADMIN — OXYCODONE AND ACETAMINOPHEN 2 TABLET(S): 5; 325 TABLET ORAL at 09:00

## 2018-10-25 RX ADMIN — Medication 166.67 MILLIGRAM(S): at 03:46

## 2018-10-25 RX ADMIN — Medication 81 MILLIGRAM(S): at 11:27

## 2018-10-25 RX ADMIN — ATORVASTATIN CALCIUM 40 MILLIGRAM(S): 80 TABLET, FILM COATED ORAL at 22:31

## 2018-10-25 RX ADMIN — PANTOPRAZOLE SODIUM 40 MILLIGRAM(S): 20 TABLET, DELAYED RELEASE ORAL at 06:26

## 2018-10-25 RX ADMIN — Medication 500000 UNIT(S): at 06:26

## 2018-10-25 RX ADMIN — Medication 166.67 MILLIGRAM(S): at 22:35

## 2018-10-25 RX ADMIN — Medication 500000 UNIT(S): at 22:31

## 2018-10-25 RX ADMIN — Medication 500000 UNIT(S): at 17:56

## 2018-10-25 NOTE — PROGRESS NOTE ADULT - SUBJECTIVE AND OBJECTIVE BOX
INTERVAL HPI/OVERNIGHT EVENTS/SUBJECTIVE:  Pt seen and examined. Posterior neck abscess i&D's yesterday. Packing changed this AM. afebrile.       ICU Vital Signs Last 24 Hrs  T(C): 36.8 (25 Oct 2018 10:41), Max: 37.3 (24 Oct 2018 16:45)  T(F): 98.3 (25 Oct 2018 10:41), Max: 99.2 (24 Oct 2018 16:45)  HR: 91 (25 Oct 2018 10:41) (83 - 91)  BP: 105/62 (25 Oct 2018 05:14) (101/67 - 105/62)  BP(mean): --  ABP: --  ABP(mean): --  RR: 18 (25 Oct 2018 10:41) (18 - 20)  SpO2: 95% (25 Oct 2018 09:09) (93% - 97%)      I&O's Detail    24 Oct 2018 07:01  -  25 Oct 2018 07:00  --------------------------------------------------------  IN:    IV PiggyBack: 500 mL  Total IN: 500 mL    OUT:  Total OUT: 0 mL    Total NET: 500 mL      MEDICATIONS  (STANDING):  ALBUTerol/ipratropium for Nebulization 3 milliLiter(s) Nebulizer every 6 hours  aspirin enteric coated 81 milliGRAM(s) Oral daily  atorvastatin 40 milliGRAM(s) Oral at bedtime  cefTRIAXone   IVPB 1 Gram(s) IV Intermittent every 24 hours  clopidogrel Tablet 75 milliGRAM(s) Oral daily  dextrose 5%. 1000 milliLiter(s) (50 mL/Hr) IV Continuous <Continuous>  dextrose 50% Injectable 12.5 Gram(s) IV Push once  dextrose 50% Injectable 25 Gram(s) IV Push once  dextrose 50% Injectable 25 Gram(s) IV Push once  enoxaparin Injectable 40 milliGRAM(s) SubCutaneous daily  insulin glargine Injectable (LANTUS) 40 Unit(s) SubCutaneous two times a day  insulin lispro (HumaLOG) corrective regimen sliding scale   SubCutaneous Before meals and at bedtime  insulin lispro Injectable (HumaLOG) 18 Unit(s) SubCutaneous three times a day before meals  lidocaine 1%/EPINEPHrine 1:100,000 Inj 20 milliLiter(s) Local Injection once  lisinopril 2.5 milliGRAM(s) Oral daily  nicotine - 21 mG/24Hr(s) Patch 1 patch Transdermal daily  nystatin    Suspension [id number] Unit(s) Oral four times a day  pantoprazole    Tablet 40 milliGRAM(s) Oral before breakfast  saccharomyces boulardii 250 milliGRAM(s) Oral two times a day  spironolactone 12.5 milliGRAM(s) Oral daily  vancomycin  IVPB 1250 milliGRAM(s) IV Intermittent every 8 hours  vancomycin  IVPB        MEDICATIONS  (PRN):  acetaminophen   Tablet .. 650 milliGRAM(s) Oral every 6 hours PRN Temp greater or equal to 38C (100.4F), Moderate Pain (4 - 6)  ALBUTerol    0.083% 2.5 milliGRAM(s) Nebulizer every 4 hours PRN Shortness of Breath and/or Wheezing  ALPRAZolam 0.25 milliGRAM(s) Oral three times a day PRN Anxiety/ Agitation  dextrose 40% Gel 15 Gram(s) Oral once PRN Blood Glucose LESS THAN 70 milliGRAM(s)/deciliter  glucagon  Injectable 1 milliGRAM(s) IntraMuscular once PRN Glucose LESS THAN 70 milligrams/deciliter  mirtazapine 7.5 milliGRAM(s) Oral at bedtime PRN Sleep  oxyCODONE    5 mG/acetaminophen 325 mG 1 Tablet(s) Oral every 6 hours PRN mild - moderate pain  oxyCODONE    5 mG/acetaminophen 325 mG 2 Tablet(s) Oral every 6 hours PRN Severe Pain (7 - 10)      MISC:     PHYSICAL EXAM:    Gen: NAD, laying comfortably     Neurological: AAOx3, no loss of sensation     Neck:     Pulmonary: non-labored, no accessory muscle use     Cardiovascular: s1/s2    Skin: posterior neck abscess dressing removed and repacked with iodoform, no active drainage or bleeding, no surrounding erythema or induration.     LABS:  CBC Full  -  ( 25 Oct 2018 07:32 )  WBC Count : 9.0 K/uL  Hemoglobin : 12.9 g/dL  Hematocrit : 40.4 %  Platelet Count - Automated : 143 K/uL  Mean Cell Volume : 92.2 fl  Mean Cell Hemoglobin : 29.5 pg  Mean Cell Hemoglobin Concentration : 31.9 g/dL  Auto Neutrophil # : x  Auto Lymphocyte # : x  Auto Monocyte # : x  Auto Eosinophil # : x  Auto Basophil # : x  Auto Neutrophil % : x  Auto Lymphocyte % : x  Auto Monocyte % : x  Auto Eosinophil % : x  Auto Basophil % : x    10-24    137  |  95<L>  |  17.0  ----------------------------<  325<H>  4.1   |  28.0  |  0.56    Ca    8.7      24 Oct 2018 09:25          RECENT CULTURES:  10-24 .Abscess Posterior neck (swabs) XXXX   Few White blood cells  No organisms seen XXXX    10-24 .Other posterior neck abscess XXXX XXXX   No growth at 1 day.  Culture in progress    10-19 .Urine Clean Catch (Midstream) XXXX XXXX   >100,000 CFU/ml Coag Negative Staphylococcus    ASSESSMENT/PLAN:  58yMale presenting with posterior neck abscess.   - daily packing changes   -monitor for fevers/leukocytosis  -rest of care per primary team  -d/c attending

## 2018-10-25 NOTE — PROGRESS NOTE ADULT - SUBJECTIVE AND OBJECTIVE BOX
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 2015/2016, who presented with complaints of worsening shortness of breath and congestion. Found to have a positive rhinovirus on viral panel. Admitted for COPD exacerbation / viral pneumonia. Responded to IV steroids, nebulizers and clinically improved. Evaluated by cardiology/ pulmonology and recommendation appreciated. Pt medically stable and pending level 2 clearance for TACO placement. Pt noted to have jump in wbc, not septic, UA positive, neck with some erythema and lump, neck xray unremarkable Pt started on PO Augmentin to cover possible UTI and skin.    Interval History:  Remains on O2 via NC.  Per SW, awaiting placement in [facility] as pt unable to return to his living situation on O2, active smoker, poor insight to his condition.  He complained of lower posterior neck pain.   CT neck confirmed Posterior neck subcutaneouscomplex abscess.  Seen by general surgery, s/p I&D with additional exploration 10/24.  Antibiotics changed by ID.     Today, reports improvement in neck pain.  No fever/ chills.  No SOB.  No additional complaints.     ____________________PHYSICAL EXAM:  Vitals reviewed as indicated below  GENERAL:  NAD Alert and Oriented x 3   HEENT: NCAT  CARDIOVASCULAR:  S1, S2  LUNGS: coarse BS b/l  ABDOMEN:  soft, (-) tenderness, (-) distension, (+) bowel sounds, (-) guarding, (-) rebound (-) rigidity  EXTREMITIES:  no cyanosis / clubbing / edema.   NECK: posterior lower neck erythema, packing in place.     ____________________    VITALS:  Vital Signs Last 24 Hrs  T(C): 37.2 (25 Oct 2018 05:14), Max: 37.3 (24 Oct 2018 16:45)  T(F): 98.9 (25 Oct 2018 05:14), Max: 99.2 (24 Oct 2018 16:45)  HR: 87 (25 Oct 2018 05:14) (83 - 94)  BP: 105/62 (25 Oct 2018 05:14) (98/60 - 105/62)  BP(mean): --  RR: 18 (25 Oct 2018 05:14) (18 - 20)  SpO2: 95% (25 Oct 2018 09:09) (93% - 97%) Daily     Daily   CAPILLARY BLOOD GLUCOSE      POCT Blood Glucose.: 233 mg/dL (25 Oct 2018 08:03)  POCT Blood Glucose.: 274 mg/dL (24 Oct 2018 20:37)  POCT Blood Glucose.: 264 mg/dL (24 Oct 2018 16:53)  POCT Blood Glucose.: 96 mg/dL (24 Oct 2018 12:11)    I&O's Summary    24 Oct 2018 07:01  -  25 Oct 2018 07:00  --------------------------------------------------------  IN: 500 mL / OUT: 0 mL / NET: 500 mL        LABS:                        12.9   9.0   )-----------( 143      ( 25 Oct 2018 07:32 )             40.4     10-24    137  |  95<L>  |  17.0  ----------------------------<  325<H>  4.1   |  28.0  |  0.56    Ca    8.7      24 Oct 2018 09:25                  MEDICATIONS:  acetaminophen   Tablet .. 650 milliGRAM(s) Oral every 6 hours PRN  ALBUTerol    0.083% 2.5 milliGRAM(s) Nebulizer every 4 hours PRN  ALBUTerol/ipratropium for Nebulization 3 milliLiter(s) Nebulizer every 6 hours  ALPRAZolam 0.25 milliGRAM(s) Oral three times a day PRN  aspirin enteric coated 81 milliGRAM(s) Oral daily  atorvastatin 40 milliGRAM(s) Oral at bedtime  cefTRIAXone   IVPB 1 Gram(s) IV Intermittent every 24 hours  clopidogrel Tablet 75 milliGRAM(s) Oral daily  dextrose 40% Gel 15 Gram(s) Oral once PRN  dextrose 5%. 1000 milliLiter(s) IV Continuous <Continuous>  dextrose 50% Injectable 12.5 Gram(s) IV Push once  dextrose 50% Injectable 25 Gram(s) IV Push once  dextrose 50% Injectable 25 Gram(s) IV Push once  enoxaparin Injectable 40 milliGRAM(s) SubCutaneous daily  glucagon  Injectable 1 milliGRAM(s) IntraMuscular once PRN  insulin glargine Injectable (LANTUS) 40 Unit(s) SubCutaneous two times a day  insulin lispro (HumaLOG) corrective regimen sliding scale   SubCutaneous Before meals and at bedtime  insulin lispro Injectable (HumaLOG) 18 Unit(s) SubCutaneous three times a day before meals  lidocaine 1%/EPINEPHrine 1:100,000 Inj 20 milliLiter(s) Local Injection once  lisinopril 2.5 milliGRAM(s) Oral daily  mirtazapine 7.5 milliGRAM(s) Oral at bedtime PRN  nicotine - 21 mG/24Hr(s) Patch 1 patch Transdermal daily  nystatin    Suspension 270779 Unit(s) Oral four times a day  oxyCODONE    5 mG/acetaminophen 325 mG 1 Tablet(s) Oral every 6 hours PRN  oxyCODONE    5 mG/acetaminophen 325 mG 2 Tablet(s) Oral every 6 hours PRN  pantoprazole    Tablet 40 milliGRAM(s) Oral before breakfast  saccharomyces boulardii 250 milliGRAM(s) Oral two times a day  spironolactone 12.5 milliGRAM(s) Oral daily  vancomycin  IVPB 1250 milliGRAM(s) IV Intermittent every 8 hours  vancomycin  IVPB

## 2018-10-25 NOTE — PROGRESS NOTE ADULT - ASSESSMENT
>Neck Cellulitis with large abscess, s/p I&D - Antibiotics changed to Vancomycin, Ceftriaxone.  ID input appreciated.  Monitor WBC.  Surgery followup appreciated.    > Acute on chronic hypercapnic and hypoxic respiratory failure - Multifactorial (COPD Exacerbation, Pulmonary HTN, and Diastolic Heart Failure) - RVP is positive for Rhinovirus.  Complete steroids, continue O2 supplements (Home o2 dependent), encourage OOB. Smoking cessation emphasized. O2NC 2L now. Per SW/ CCM Pt renting room and his landlord will not allow him on oxygen tank given active smoking.  Awaiting TACO placement  > DMII on long term insulin therapy - FS remain elevated.  Will increase Lantus to BID dosing.  May consider escalating Lantus further.    > Smoker - Nicotine Patch. Smoking cessation counseled.   > Acute on chronic Diastolic CHF - C/w aldactone, Lisinopril. Metoprolol d/ilir due to pause by cardiology.   > COPD/ PHTN- Plan as above, O2 supplements, Prednisone taper, Duoneb. Pulmonology signed off.   > CAD and HLD - Continue Aspirin, Plavix, Lipitor. Metoprolol d/ilir by cardio as above.   > Anxiety / mood / Sleep disorder- Seen by Psych, started on Xanax/ Remeron.    > DVT Prophylaxis -- Lovenox 40 mg  > Dispo- Holding discharge pending improvement in ID issues.

## 2018-10-25 NOTE — PROGRESS NOTE ADULT - SUBJECTIVE AND OBJECTIVE BOX
[facility]  INFECTIOUS DISEASES AND INTERNAL MEDICINE at [place]  =======================================================  Luis Baldwin MD  Diplomates American Board of Internal Medicine and Infectious Diseases  =======================================================    DEVIKA CARBALLO [id number]    Follow up: Neck Abscess      Allergies:  No Known Allergies      Antibiotics:  cefTRIAXone   IVPB 1 Gram(s) IV Intermittent every 24 hours  vancomycin  IVPB 1250 milliGRAM(s) IV Intermittent every 8 hours        REVIEW OF SYSTEMS:  CONSTITUTIONAL:  No Fever or chills  HEENT:   No diplopia or blurred vision.  No earache, sore throat or runny nose.  CARDIOVASCULAR:  No pressure, squeezing, strangling, tightness, heaviness or aching about the chest, neck, axilla or epigastrium.  RESPIRATORY:  No cough, shortness of breath  GASTROINTESTINAL:  No nausea, vomiting or diarrhea.  GENITOURINARY:  No dysuria, frequency or urgency. No Blood in urine  MUSCULOSKELETAL:  no joint aches, no muscle pain  SKIN:  some drainge from site of I&D on neck  NEUROLOGIC:  No paresthesias, fasciculations,   PSYCHIATRIC:  No disorder of thought or mood.  ENDOCRINE:  No heat or cold intolerance  HEMATOLOGICAL:  No easy bruising or bleeding.       Physical Exam:  Vital Signs Last 24 Hrs  T(C): 36.8 (25 Oct 2018 10:41), Max: 37.3 (24 Oct 2018 16:45)  T(F): 98.3 (25 Oct 2018 10:41), Max: 99.2 (24 Oct 2018 16:45)  HR: 91 (25 Oct 2018 10:41) (83 - 91)  BP: 105/62 (25 Oct 2018 05:14) (101/67 - 105/62)  RR: 18 (25 Oct 2018 10:41) (18 - 20)  SpO2: 95% (25 Oct 2018 09:09) (93% - 97%)      GEN: NAD, pleasant  HEENT: normocephalic and atraumatic. EOMI. PERRL.    NECK: Supple.   LUNGS: Clear to auscultation.  HEART: Regular rate and rhythm  ABDOMEN: Soft, nontender, and nondistended.  Positive bowel sounds.    : No CVA tenderness  EXTREMITIES: Without any edema.  MSK: no joint swelling  NEUROLOGIC: no focal deficits  PSYCHIATRIC: Appropriate affect .  SKIN: No Rash      Labs:  10-24    137  |  95<L>  |  17.0  ----------------------------<  325<H>  4.1   |  28.0  |  0.56    Ca    8.7      24 Oct 2018 09:25               12.9   9.0   )-----------( 143      ( 25 Oct 2018 07:32 )             40.4     RECENT CULTURES:  10-24 @ 18:14 .Abscess Posterior neck (swabs)     Few White blood cells  No organisms seen      10-24 @ 14:43 .Other posterior neck abscess     No growth at 1 day.  Culture in progress      10-19 @ 18:43 .Urine Clean Catch (Midstream)     >100,000 CFU/ml Coag Negative Staphylococcus

## 2018-10-25 NOTE — PROGRESS NOTE ADULT - ASSESSMENT
57 y/o M with neck abscess s/p I&D 10/24/18    Neck Abscess s/p I&D 10/24/18  s/p COPD exacerbation  s/p Viral pneumonia  DM type 2, Uncontrolled: HbA1c 10.8%    - CT neck with subcutaneous abscess  - s/p I&D 10/24/18  - Cultures pending  - No fevers   - Was on steroids till 10/23/18 and has uncontrolled DM type 2   - Will trend leukocytosis  - Continue Vancomycin  - Monitor Vancomycin trough   - Continue Ceftriaxone 1gm IV q 24hours  - Plan for IV antibiotics till 10/29/18    Will Follow

## 2018-10-26 LAB
CULTURE RESULTS: SIGNIFICANT CHANGE UP
GLUCOSE BLDC GLUCOMTR-MCNC: 185 MG/DL — HIGH (ref 70–99)
GLUCOSE BLDC GLUCOMTR-MCNC: 187 MG/DL — HIGH (ref 70–99)
GLUCOSE BLDC GLUCOMTR-MCNC: 261 MG/DL — HIGH (ref 70–99)
GLUCOSE BLDC GLUCOMTR-MCNC: 331 MG/DL — HIGH (ref 70–99)
HCT VFR BLD CALC: 40.2 % — LOW (ref 42–52)
HGB BLD-MCNC: 13.2 G/DL — LOW (ref 14–18)
MCHC RBC-ENTMCNC: 29.4 PG — SIGNIFICANT CHANGE UP (ref 27–31)
MCHC RBC-ENTMCNC: 32.8 G/DL — SIGNIFICANT CHANGE UP (ref 32–36)
MCV RBC AUTO: 89.5 FL — SIGNIFICANT CHANGE UP (ref 80–94)
PLATELET # BLD AUTO: 150 K/UL — SIGNIFICANT CHANGE UP (ref 150–400)
RBC # BLD: 4.49 M/UL — LOW (ref 4.6–6.2)
RBC # FLD: 14.6 % — SIGNIFICANT CHANGE UP (ref 11–15.6)
SPECIMEN SOURCE: SIGNIFICANT CHANGE UP
VANCOMYCIN TROUGH SERPL-MCNC: 15.1 UG/ML — SIGNIFICANT CHANGE UP (ref 10–20)
WBC # BLD: 11.6 K/UL — HIGH (ref 4.8–10.8)
WBC # FLD AUTO: 11.6 K/UL — HIGH (ref 4.8–10.8)

## 2018-10-26 PROCEDURE — 99232 SBSQ HOSP IP/OBS MODERATE 35: CPT | Mod: 25

## 2018-10-26 PROCEDURE — 99233 SBSQ HOSP IP/OBS HIGH 50: CPT

## 2018-10-26 RX ADMIN — CEFTRIAXONE 100 GRAM(S): 500 INJECTION, POWDER, FOR SOLUTION INTRAMUSCULAR; INTRAVENOUS at 16:32

## 2018-10-26 RX ADMIN — Medication 166.67 MILLIGRAM(S): at 21:50

## 2018-10-26 RX ADMIN — Medication 1 PATCH: at 11:50

## 2018-10-26 RX ADMIN — Medication 1 PATCH: at 07:52

## 2018-10-26 RX ADMIN — Medication 2: at 11:51

## 2018-10-26 RX ADMIN — Medication 0.25 MILLIGRAM(S): at 21:49

## 2018-10-26 RX ADMIN — Medication 10 UNIT(S): at 08:02

## 2018-10-26 RX ADMIN — Medication 3 MILLILITER(S): at 03:30

## 2018-10-26 RX ADMIN — Medication 10 UNIT(S): at 16:38

## 2018-10-26 RX ADMIN — Medication 10 UNIT(S): at 11:51

## 2018-10-26 RX ADMIN — Medication 250 MILLIGRAM(S): at 05:46

## 2018-10-26 RX ADMIN — Medication 8: at 16:38

## 2018-10-26 RX ADMIN — Medication 166.67 MILLIGRAM(S): at 17:09

## 2018-10-26 RX ADMIN — ENOXAPARIN SODIUM 40 MILLIGRAM(S): 100 INJECTION SUBCUTANEOUS at 11:49

## 2018-10-26 RX ADMIN — Medication 500000 UNIT(S): at 21:49

## 2018-10-26 RX ADMIN — Medication 2: at 08:02

## 2018-10-26 RX ADMIN — OXYCODONE AND ACETAMINOPHEN 2 TABLET(S): 5; 325 TABLET ORAL at 12:16

## 2018-10-26 RX ADMIN — Medication 3 MILLILITER(S): at 09:21

## 2018-10-26 RX ADMIN — Medication 1 PATCH: at 19:29

## 2018-10-26 RX ADMIN — Medication 500000 UNIT(S): at 18:39

## 2018-10-26 RX ADMIN — OXYCODONE AND ACETAMINOPHEN 2 TABLET(S): 5; 325 TABLET ORAL at 18:41

## 2018-10-26 RX ADMIN — SPIRONOLACTONE 12.5 MILLIGRAM(S): 25 TABLET, FILM COATED ORAL at 05:46

## 2018-10-26 RX ADMIN — Medication 6: at 21:50

## 2018-10-26 RX ADMIN — Medication 250 MILLIGRAM(S): at 18:39

## 2018-10-26 RX ADMIN — PANTOPRAZOLE SODIUM 40 MILLIGRAM(S): 20 TABLET, DELAYED RELEASE ORAL at 05:46

## 2018-10-26 RX ADMIN — OXYCODONE AND ACETAMINOPHEN 2 TABLET(S): 5; 325 TABLET ORAL at 19:36

## 2018-10-26 RX ADMIN — Medication 3 MILLILITER(S): at 15:12

## 2018-10-26 RX ADMIN — Medication 166.67 MILLIGRAM(S): at 05:53

## 2018-10-26 RX ADMIN — Medication 500000 UNIT(S): at 05:46

## 2018-10-26 RX ADMIN — Medication 500000 UNIT(S): at 11:49

## 2018-10-26 RX ADMIN — OXYCODONE AND ACETAMINOPHEN 2 TABLET(S): 5; 325 TABLET ORAL at 11:50

## 2018-10-26 RX ADMIN — ATORVASTATIN CALCIUM 40 MILLIGRAM(S): 80 TABLET, FILM COATED ORAL at 21:50

## 2018-10-26 RX ADMIN — Medication 1 PATCH: at 11:52

## 2018-10-26 RX ADMIN — CLOPIDOGREL BISULFATE 75 MILLIGRAM(S): 75 TABLET, FILM COATED ORAL at 11:50

## 2018-10-26 RX ADMIN — Medication 81 MILLIGRAM(S): at 11:50

## 2018-10-26 RX ADMIN — Medication 3 MILLILITER(S): at 20:47

## 2018-10-26 RX ADMIN — LISINOPRIL 2.5 MILLIGRAM(S): 2.5 TABLET ORAL at 05:46

## 2018-10-26 RX ADMIN — INSULIN GLARGINE 40 UNIT(S): 100 INJECTION, SOLUTION SUBCUTANEOUS at 21:49

## 2018-10-26 RX ADMIN — INSULIN GLARGINE 40 UNIT(S): 100 INJECTION, SOLUTION SUBCUTANEOUS at 08:02

## 2018-10-26 NOTE — PROGRESS NOTE ADULT - ASSESSMENT
59 y/o M with neck abscess s/p I&D 10/24/18    Neck Abscess s/p I&D 10/24/18  s/p COPD exacerbation  s/p Viral pneumonia  DM type 2, Uncontrolled: HbA1c 10.8%    - CT neck with subcutaneous abscess  - s/p I&D 10/24/18  - Cultures so far with CoNS  - No fevers   - Was on steroids till 10/23/18 and has uncontrolled DM type 2   - Will trend leukocytosis  - Continue Vancomycin  - Monitor Vancomycin trough   - Continue Ceftriaxone 1gm IV q 24hours  - Plan for IV antibiotics till 10/29/18    Will Sign off. Please call PRN.

## 2018-10-26 NOTE — PROGRESS NOTE ADULT - ASSESSMENT
>Neck Cellulitis with large abscess, s/p I&D - Antibiotics changed to Vancomycin, Ceftriaxone.  D/w Dr. Everett at bedside.  Monitor WBC.  IV abx expected until 10/29.   > Acute on chronic hypercapnic and hypoxic respiratory failure - Multifactorial (COPD Exacerbation, Pulmonary HTN, and Diastolic Heart Failure) - RVP is positive for Rhinovirus.  Complete steroids, continue O2 supplements (Home o2 dependent), encourage OOB. Smoking cessation emphasized. O2NC 2L now. Per SW/ [facility] Pt renting room and his landlord will not allow him on oxygen tank given active smoking.  Awaiting TAC[facility] placement  > DMII on long term insulin therapy - FS now well controlled.  Continue Lantus with BID dosing.  Continue Premeal insulin and insulin coverage.    > Smoker - Nicotine Patch. Smoking cessation counseled.   > Acute on chronic Diastolic CHF - C/w aldactone, Lisinopril. Metoprolol d/ilir due to pause by cardiology.   > COPD/ PHTN- Plan as above, O2 supplements, Prednisone taper, Duoneb. Pulmonology signed off.   > CAD and HLD - Continue Aspirin, Plavix, Lipitor. Metoprolol d/ilir by cardio as above.   > Anxiety / mood / Sleep disorder- Seen by Psych, started on Xanax/ Remeron.    > DVT Prophylaxis -- Lovenox 40 mg  > Dispo- Planning TACO placement Monday upon completion of IV Abx.  D/w Sister and pt, in agreement.  D/w AMANDA.

## 2018-10-26 NOTE — PHARMACOTHERAPY INTERVENTION NOTE - COMMENTS
59yo male on vanco 1250mg every 8 hours. Level ordered for 10/26/18 @13:00 (1 hour before giving 2pm dose). Will monitor/trend renal function and levels to dose adjust as needed.    Doses given:  10/23 @22:32  10/24 @ 8:23, 17:08  10/25 @ 3:46, 11:32, 22:35  10/26 @ 5:53    Levels:   10/25 @00:16 - 12.9
patient will complete rocephin dose on 10/29, vancomycin will continue to be dosed by trough and discontinued when appropriate

## 2018-10-26 NOTE — PROGRESS NOTE ADULT - SUBJECTIVE AND OBJECTIVE BOX
Patient: DEVIKA CARBALLO [id number] 58y Male                           Internal Medicine Hospitalist Progress Note  Chief Complaint: Patient is a 58y old  Male who presents with a chief complaint of shortness of breath (22 Oct 2018 08:32)    Initial HPI:  Pt is 59 yo male with PMHx of pulmonary HTN, GERD, Bipolar disorder, HTN, HLD, current everyday 0.5 PPD smoker, DM-II on insulin, severe COPD/emphysema on home oxygen 2L, CHF with last exacerbation in 2015 with CAD s/p PCI in [place] in ? 2015/2016, who presented with complaints of worsening shortness of breath and congestion. Found to have a positive rhinovirus on viral panel. Admitted for COPD exacerbation / viral pneumonia. Responded to IV steroids, nebulizers and clinically improved. Evaluated by cardiology/ pulmonology and recommendation appreciated. Pt medically stable and pending level 2 clearance for TACO placement. Pt noted to have jump in wbc, not septic, UA positive, neck with some erythema and lump, neck xray unremarkable Pt started on PO Augmentin to cover possible UTI and skin.    Interval History:  Remains on O2 via NC.  Per SW, awaiting placement in [facility] as pt unable to return to his living situation on O2, active smoker, poor insight to his condition.  He complained of lower posterior neck pain.   CT neck confirmed Posterior neck subcutaneouscomplex abscess.  Seen by general surgery, s/p I&D with additional exploration 10/24.  Antibiotics changed by ID.     Today seen with sister at bedside.  Reports improvement in neck pain.  No fever / chills.  No SOB.  Remains on O2.      ____________________PHYSICAL EXAM:  Vitals reviewed as indicated below  GENERAL:  NAD Alert and Oriented x 3   HEENT: NCAT  CARDIOVASCULAR:  S1, S2  LUNGS: coarse BS b/l  ABDOMEN:  soft, (-) tenderness, (-) distension, (+) bowel sounds, (-) guarding, (-) rebound (-) rigidity  EXTREMITIES:  no cyanosis / clubbing / edema.   NECK: posterior lower neck erythema, packing in place.     ____________________    VITALS:  Vital Signs Last 24 Hrs  T(C): 37.1 (26 Oct 2018 04:44), Max: 37.2 (25 Oct 2018 22:29)  T(F): 98.7 (26 Oct 2018 04:44), Max: 99 (25 Oct 2018 22:29)  HR: 88 (26 Oct 2018 09:22) (65 - 92)  BP: 105/70 (26 Oct 2018 04:44) (93/64 - 105/70)  BP(mean): --  RR: 18 (26 Oct 2018 08:00) (18 - 20)  SpO2: 95% (26 Oct 2018 09:22) (94% - 97%) Daily     Daily   CAPILLARY BLOOD GLUCOSE      POCT Blood Glucose.: 187 mg/dL (26 Oct 2018 11:49)  POCT Blood Glucose.: 185 mg/dL (26 Oct 2018 08:01)  POCT Blood Glucose.: 132 mg/dL (25 Oct 2018 22:00)  POCT Blood Glucose.: 118 mg/dL (25 Oct 2018 16:52)    I&O's Summary    25 Oct 2018 07:01  -  26 Oct 2018 07:00  --------------------------------------------------------  IN: 1930 mL / OUT: 0 mL / NET: 1930 mL        LABS:                        13.2   11.6  )-----------( 150      ( 26 Oct 2018 07:23 )             40.2                       MEDICATIONS:  acetaminophen   Tablet .. 650 milliGRAM(s) Oral every 6 hours PRN  ALBUTerol    0.083% 2.5 milliGRAM(s) Nebulizer every 4 hours PRN  ALBUTerol/ipratropium for Nebulization 3 milliLiter(s) Nebulizer every 6 hours  ALPRAZolam 0.25 milliGRAM(s) Oral three times a day PRN  aspirin enteric coated 81 milliGRAM(s) Oral daily  atorvastatin 40 milliGRAM(s) Oral at bedtime  cefTRIAXone   IVPB 1 Gram(s) IV Intermittent every 24 hours  clopidogrel Tablet 75 milliGRAM(s) Oral daily  dextrose 40% Gel 15 Gram(s) Oral once PRN  dextrose 5%. 1000 milliLiter(s) IV Continuous <Continuous>  dextrose 50% Injectable 12.5 Gram(s) IV Push once  dextrose 50% Injectable 25 Gram(s) IV Push once  dextrose 50% Injectable 25 Gram(s) IV Push once  enoxaparin Injectable 40 milliGRAM(s) SubCutaneous daily  glucagon  Injectable 1 milliGRAM(s) IntraMuscular once PRN  insulin glargine Injectable (LANTUS) 40 Unit(s) SubCutaneous two times a day  insulin lispro (HumaLOG) corrective regimen sliding scale   SubCutaneous Before meals and at bedtime  insulin lispro Injectable (HumaLOG) 10 Unit(s) SubCutaneous three times a day before meals  lidocaine 1%/EPINEPHrine 1:100,000 Inj 20 milliLiter(s) Local Injection once  lisinopril 2.5 milliGRAM(s) Oral daily  mirtazapine 7.5 milliGRAM(s) Oral at bedtime PRN  nicotine - 21 mG/24Hr(s) Patch 1 patch Transdermal daily  nystatin    Suspension 621122 Unit(s) Oral four times a day  oxyCODONE    5 mG/acetaminophen 325 mG 1 Tablet(s) Oral every 6 hours PRN  oxyCODONE    5 mG/acetaminophen 325 mG 2 Tablet(s) Oral every 6 hours PRN  pantoprazole    Tablet 40 milliGRAM(s) Oral before breakfast  saccharomyces boulardii 250 milliGRAM(s) Oral two times a day  spironolactone 12.5 milliGRAM(s) Oral daily  vancomycin  IVPB 1250 milliGRAM(s) IV Intermittent every 8 hours  vancomycin  IVPB

## 2018-10-26 NOTE — PROGRESS NOTE ADULT - SUBJECTIVE AND OBJECTIVE BOX
[facility]  INFECTIOUS DISEASES AND INTERNAL MEDICINE at [place]  =======================================================  Luis Baldwin MD  Diplomates American Board of Internal Medicine and Infectious Diseases  =======================================================    DEVIKA CARBALLO [id number]    Follow up: Neck Abscess    No fevers, chills, diarrhea    Allergies:  No Known Allergies      Antibiotics:  cefTRIAXone   IVPB 1 Gram(s) IV Intermittent every 24 hours  vancomycin  IVPB 1250 milliGRAM(s) IV Intermittent every 8 hours        REVIEW OF SYSTEMS:  CONSTITUTIONAL:  No Fever or chills  HEENT:   No diplopia or blurred vision.  No earache, sore throat or runny nose.  CARDIOVASCULAR:  No pressure, squeezing, strangling, tightness, heaviness or aching about the chest, neck, axilla or epigastrium.  RESPIRATORY:  No cough, shortness of breath  GASTROINTESTINAL:  No nausea, vomiting or diarrhea.  GENITOURINARY:  No dysuria, frequency or urgency. No Blood in urine  MUSCULOSKELETAL:  no joint aches, no muscle pain  SKIN:  some drainge from site of I&D on neck  NEUROLOGIC:  No paresthesias, fasciculations,   PSYCHIATRIC:  No disorder of thought or mood.  ENDOCRINE:  No heat or cold intolerance  HEMATOLOGICAL:  No easy bruising or bleeding.       Physical Exam:  Vital Signs Last 24 Hrs  T(C): 37.1 (26 Oct 2018 04:44), Max: 37.2 (25 Oct 2018 22:29)  T(F): 98.7 (26 Oct 2018 04:44), Max: 99 (25 Oct 2018 22:29)  HR: 88 (26 Oct 2018 09:22) (65 - 92)  BP: 105/70 (26 Oct 2018 04:44) (93/64 - 105/70)  RR: 18 (26 Oct 2018 08:00) (18 - 20)  SpO2: 95% (26 Oct 2018 09:22) (94% - 97%)      GEN: NAD, pleasant  HEENT: normocephalic and atraumatic. EOMI. PERRL.    NECK: Supple.   LUNGS: Clear to auscultation.  HEART: Regular rate and rhythm  ABDOMEN: Soft, nontender, and nondistended.  Positive bowel sounds.    : No CVA tenderness  EXTREMITIES: Without any edema.  MSK: no joint swelling  NEUROLOGIC: no focal deficits  PSYCHIATRIC: Appropriate affect .  SKIN: No Rash      Labs:             13.2   11.6  )-----------( 150      ( 26 Oct 2018 07:23 )             40.2     RECENT CULTURES:  10-24 @ 18:14 .Abscess Posterior neck (swabs)     No growth at 1 day.  Culture in progress  Few White blood cells  No organisms seen      10-24 @ 14:43 .Other posterior neck abscess     Few Coag Negative Staphylococcus      10-19 @ 18:43 .Urine Clean Catch (Midstream)     >100,000 CFU/ml Coag Negative Staphylococcus

## 2018-10-26 NOTE — PROGRESS NOTE ADULT - ATTENDING COMMENTS
Incision extended yesterday with further expression of purulent output. Pain improved. Wound on posterior neck with improved erythema and improving induration. Continue packing change once daily. Continue abx per ID.  Will hopefully be able to remove all packing tomorrow, allow patient to shower normally and cover wound with just gauze without packing. Will follow.
Plan of care discussed with Pt.
Plan of care discussed with Pt/ CCM/ SW.
Plan of care discussed with Pt/ CCM/ SW/ RN
Plan of care discussed with Pt/ RN
imprved pain of posterior neck. Afebrile. Improved induration, minimal drainage from incision site, minimal tenderness.  Packing from wound removed.    Recommend patient shower normally and allow soap and water over the wound. Pat dry. Dry gauze. No further packing required. Continue abx per ID. Glucose control per medicine.
ADEENDUM 3:50 PM- Pt wants to go home, reassessed at bedside with RN and SW/ CCM this afternoon, Pt requiring 5L O2, does not have Oxygen tank at home per SW. Pt not yet cleared for discharge however He wants to leave, Upon assessing the capacity I doubt Pt has capacity- He keeps saying I will be fine but does not understand the consequences. Psych consulted to determine capacity and in the meantime Pt can not leave AMA. RN/ SW / CCM aware.

## 2018-10-26 NOTE — PROGRESS NOTE ADULT - SUBJECTIVE AND OBJECTIVE BOX
INTERVAL HPI/OVERNIGHT EVENTS/SUBJECTIVE:  Doing well. Dressing removed. Not repacked as it is clean and no purulence.     Vital Signs Last 24 Hrs  T(C): 37.1 (26 Oct 2018 04:44), Max: 37.2 (25 Oct 2018 22:29)  T(F): 98.7 (26 Oct 2018 04:44), Max: 99 (25 Oct 2018 22:29)  HR: 88 (26 Oct 2018 09:22) (65 - 92)  BP: 105/70 (26 Oct 2018 04:44) (93/64 - 105/70)  BP(mean): --  ABP: --  ABP(mean): --  RR: 18 (26 Oct 2018 08:00) (18 - 20)  SpO2: 95% (26 Oct 2018 09:22) (94% - 97%)      I&O's Detail    25 Oct 2018 07:01  -  26 Oct 2018 07:00  --------------------------------------------------------  IN:    IV PiggyBack: 850 mL    Oral Fluid: 1080 mL  Total IN: 1930 mL    OUT:  Total OUT: 0 mL    Total NET: 1930 mL                MEDICATIONS  (STANDING):  ALBUTerol/ipratropium for Nebulization 3 milliLiter(s) Nebulizer every 6 hours  aspirin enteric coated 81 milliGRAM(s) Oral daily  atorvastatin 40 milliGRAM(s) Oral at bedtime  cefTRIAXone   IVPB 1 Gram(s) IV Intermittent every 24 hours  clopidogrel Tablet 75 milliGRAM(s) Oral daily  dextrose 5%. 1000 milliLiter(s) (50 mL/Hr) IV Continuous <Continuous>  dextrose 50% Injectable 12.5 Gram(s) IV Push once  dextrose 50% Injectable 25 Gram(s) IV Push once  dextrose 50% Injectable 25 Gram(s) IV Push once  enoxaparin Injectable 40 milliGRAM(s) SubCutaneous daily  insulin glargine Injectable (LANTUS) 40 Unit(s) SubCutaneous two times a day  insulin lispro (HumaLOG) corrective regimen sliding scale   SubCutaneous Before meals and at bedtime  insulin lispro Injectable (HumaLOG) 10 Unit(s) SubCutaneous three times a day before meals  lidocaine 1%/EPINEPHrine 1:100,000 Inj 20 milliLiter(s) Local Injection once  lisinopril 2.5 milliGRAM(s) Oral daily  nicotine - 21 mG/24Hr(s) Patch 1 patch Transdermal daily  nystatin    Suspension 829390 Unit(s) Oral four times a day  pantoprazole    Tablet 40 milliGRAM(s) Oral before breakfast  saccharomyces boulardii 250 milliGRAM(s) Oral two times a day  spironolactone 12.5 milliGRAM(s) Oral daily  vancomycin  IVPB 1250 milliGRAM(s) IV Intermittent every 8 hours  vancomycin  IVPB        MEDICATIONS  (PRN):  acetaminophen   Tablet .. 650 milliGRAM(s) Oral every 6 hours PRN Temp greater or equal to 38C (100.4F), Moderate Pain (4 - 6)  ALBUTerol    0.083% 2.5 milliGRAM(s) Nebulizer every 4 hours PRN Shortness of Breath and/or Wheezing  ALPRAZolam 0.25 milliGRAM(s) Oral three times a day PRN Anxiety/ Agitation  dextrose 40% Gel 15 Gram(s) Oral once PRN Blood Glucose LESS THAN 70 milliGRAM(s)/deciliter  glucagon  Injectable 1 milliGRAM(s) IntraMuscular once PRN Glucose LESS THAN 70 milligrams/deciliter  mirtazapine 7.5 milliGRAM(s) Oral at bedtime PRN Sleep  oxyCODONE    5 mG/acetaminophen 325 mG 1 Tablet(s) Oral every 6 hours PRN mild - moderate pain  oxyCODONE    5 mG/acetaminophen 325 mG 2 Tablet(s) Oral every 6 hours PRN Severe Pain (7 - 10)      NUTRITION/IVF:  As tolerated    PHYSICAL EXAM:    Gen: well appearing male in NAD    Eyes: EOMI    Neurological: A&Ox3, no focal deficits    Neck: wound clean, no purulence, improving erythema. Packing removed    Pulmonary: unlabored        LABS:  CBC Full  -  ( 26 Oct 2018 07:23 )  WBC Count : 11.6 K/uL  Hemoglobin : 13.2 g/dL  Hematocrit : 40.2 %  Platelet Count - Automated : 150 K/uL  Mean Cell Volume : 89.5 fl  Mean Cell Hemoglobin : 29.4 pg  Mean Cell Hemoglobin Concentration : 32.8 g/dL  Auto Neutrophil # : x  Auto Lymphocyte # : x  Auto Monocyte # : x  Auto Eosinophil # : x  Auto Basophil # : x  Auto Neutrophil % : x  Auto Lymphocyte % : x  Auto Monocyte % : x  Auto Eosinophil % : x  Auto Basophil % : x              RECENT CULTURES:  10-24 .Abscess Posterior neck (swabs) XXXX   Few White blood cells  No organisms seen   No growth at 1 day.  Culture in progress    10-24 .Other posterior neck abscess XXXX XXXX   Few Coag Negative Staphylococcus    10-19 .Urine Clean Catch (Midstream) XXXX XXXX   >100,000 CFU/ml Coag Negative Staphylococcus              CAPILLARY BLOOD GLUCOSE      RADIOLOGY & ADDITIONAL STUDIES:    ASSESSMENT/PLAN:  58yMale presenting with:  1. Posterior neck abscess, s/p I&D    - sterile 4x4 dressing applied. No need to further pack. Plan per primary. Will reevaluate wound on 10/27 am and if stable/sign off.

## 2018-10-27 LAB
GLUCOSE BLDC GLUCOMTR-MCNC: 154 MG/DL — HIGH (ref 70–99)
GLUCOSE BLDC GLUCOMTR-MCNC: 176 MG/DL — HIGH (ref 70–99)
GLUCOSE BLDC GLUCOMTR-MCNC: 181 MG/DL — HIGH (ref 70–99)
GLUCOSE BLDC GLUCOMTR-MCNC: 298 MG/DL — HIGH (ref 70–99)
HCT VFR BLD CALC: 37.1 % — LOW (ref 42–52)
HGB BLD-MCNC: 11.9 G/DL — LOW (ref 14–18)
MCHC RBC-ENTMCNC: 28.6 PG — SIGNIFICANT CHANGE UP (ref 27–31)
MCHC RBC-ENTMCNC: 32.1 G/DL — SIGNIFICANT CHANGE UP (ref 32–36)
MCV RBC AUTO: 89.2 FL — SIGNIFICANT CHANGE UP (ref 80–94)
PLATELET # BLD AUTO: 162 K/UL — SIGNIFICANT CHANGE UP (ref 150–400)
RBC # BLD: 4.16 M/UL — LOW (ref 4.6–6.2)
RBC # FLD: 14.3 % — SIGNIFICANT CHANGE UP (ref 11–15.6)
WBC # BLD: 9.3 K/UL — SIGNIFICANT CHANGE UP (ref 4.8–10.8)
WBC # FLD AUTO: 9.3 K/UL — SIGNIFICANT CHANGE UP (ref 4.8–10.8)

## 2018-10-27 PROCEDURE — 99232 SBSQ HOSP IP/OBS MODERATE 35: CPT

## 2018-10-27 RX ADMIN — Medication 10 UNIT(S): at 17:10

## 2018-10-27 RX ADMIN — OXYCODONE AND ACETAMINOPHEN 2 TABLET(S): 5; 325 TABLET ORAL at 17:22

## 2018-10-27 RX ADMIN — SPIRONOLACTONE 12.5 MILLIGRAM(S): 25 TABLET, FILM COATED ORAL at 05:37

## 2018-10-27 RX ADMIN — Medication 10 UNIT(S): at 11:23

## 2018-10-27 RX ADMIN — Medication 166.67 MILLIGRAM(S): at 13:50

## 2018-10-27 RX ADMIN — Medication 6: at 07:34

## 2018-10-27 RX ADMIN — Medication 3 MILLILITER(S): at 02:52

## 2018-10-27 RX ADMIN — ENOXAPARIN SODIUM 40 MILLIGRAM(S): 100 INJECTION SUBCUTANEOUS at 11:22

## 2018-10-27 RX ADMIN — Medication 500000 UNIT(S): at 17:11

## 2018-10-27 RX ADMIN — OXYCODONE AND ACETAMINOPHEN 2 TABLET(S): 5; 325 TABLET ORAL at 08:41

## 2018-10-27 RX ADMIN — OXYCODONE AND ACETAMINOPHEN 2 TABLET(S): 5; 325 TABLET ORAL at 07:37

## 2018-10-27 RX ADMIN — Medication 166.67 MILLIGRAM(S): at 21:53

## 2018-10-27 RX ADMIN — Medication 500000 UNIT(S): at 05:37

## 2018-10-27 RX ADMIN — PANTOPRAZOLE SODIUM 40 MILLIGRAM(S): 20 TABLET, DELAYED RELEASE ORAL at 05:37

## 2018-10-27 RX ADMIN — Medication 81 MILLIGRAM(S): at 11:23

## 2018-10-27 RX ADMIN — Medication 3 MILLILITER(S): at 21:33

## 2018-10-27 RX ADMIN — Medication 3 MILLILITER(S): at 09:09

## 2018-10-27 RX ADMIN — OXYCODONE AND ACETAMINOPHEN 1 TABLET(S): 5; 325 TABLET ORAL at 01:00

## 2018-10-27 RX ADMIN — CEFTRIAXONE 100 GRAM(S): 500 INJECTION, POWDER, FOR SOLUTION INTRAMUSCULAR; INTRAVENOUS at 17:10

## 2018-10-27 RX ADMIN — OXYCODONE AND ACETAMINOPHEN 2 TABLET(S): 5; 325 TABLET ORAL at 18:17

## 2018-10-27 RX ADMIN — Medication 500000 UNIT(S): at 11:22

## 2018-10-27 RX ADMIN — Medication 3 MILLILITER(S): at 15:32

## 2018-10-27 RX ADMIN — Medication 1 PATCH: at 11:23

## 2018-10-27 RX ADMIN — Medication 2: at 11:23

## 2018-10-27 RX ADMIN — Medication 2: at 17:11

## 2018-10-27 RX ADMIN — Medication 1 PATCH: at 11:22

## 2018-10-27 RX ADMIN — OXYCODONE AND ACETAMINOPHEN 1 TABLET(S): 5; 325 TABLET ORAL at 02:00

## 2018-10-27 RX ADMIN — Medication 1 PATCH: at 07:29

## 2018-10-27 RX ADMIN — INSULIN GLARGINE 40 UNIT(S): 100 INJECTION, SOLUTION SUBCUTANEOUS at 21:53

## 2018-10-27 RX ADMIN — CLOPIDOGREL BISULFATE 75 MILLIGRAM(S): 75 TABLET, FILM COATED ORAL at 11:22

## 2018-10-27 RX ADMIN — Medication 1 PATCH: at 19:14

## 2018-10-27 RX ADMIN — Medication 10 UNIT(S): at 07:34

## 2018-10-27 RX ADMIN — Medication 250 MILLIGRAM(S): at 17:11

## 2018-10-27 RX ADMIN — INSULIN GLARGINE 40 UNIT(S): 100 INJECTION, SOLUTION SUBCUTANEOUS at 07:34

## 2018-10-27 RX ADMIN — Medication 250 MILLIGRAM(S): at 05:37

## 2018-10-27 RX ADMIN — Medication 166.67 MILLIGRAM(S): at 05:37

## 2018-10-27 RX ADMIN — Medication 2: at 21:53

## 2018-10-27 RX ADMIN — ATORVASTATIN CALCIUM 40 MILLIGRAM(S): 80 TABLET, FILM COATED ORAL at 21:53

## 2018-10-27 NOTE — PROGRESS NOTE ADULT - SUBJECTIVE AND OBJECTIVE BOX
HPI/OVERNIGHT EVENTS:  No acute events overnight. Afebrile. VSS. Denies f/c/n/v/cp/sob    MEDICATIONS  (STANDING):  ALBUTerol/ipratropium for Nebulization 3 milliLiter(s) Nebulizer every 6 hours  aspirin enteric coated 81 milliGRAM(s) Oral daily  atorvastatin 40 milliGRAM(s) Oral at bedtime  cefTRIAXone   IVPB 1 Gram(s) IV Intermittent every 24 hours  clopidogrel Tablet 75 milliGRAM(s) Oral daily  dextrose 5%. 1000 milliLiter(s) (50 mL/Hr) IV Continuous <Continuous>  dextrose 50% Injectable 12.5 Gram(s) IV Push once  dextrose 50% Injectable 25 Gram(s) IV Push once  dextrose 50% Injectable 25 Gram(s) IV Push once  enoxaparin Injectable 40 milliGRAM(s) SubCutaneous daily  insulin glargine Injectable (LANTUS) 40 Unit(s) SubCutaneous two times a day  insulin lispro (HumaLOG) corrective regimen sliding scale   SubCutaneous Before meals and at bedtime  insulin lispro Injectable (HumaLOG) 10 Unit(s) SubCutaneous three times a day before meals  lidocaine 1%/EPINEPHrine 1:100,000 Inj 20 milliLiter(s) Local Injection once  lisinopril 2.5 milliGRAM(s) Oral daily  nicotine - 21 mG/24Hr(s) Patch 1 patch Transdermal daily  nystatin    Suspension 604659 Unit(s) Oral four times a day  pantoprazole    Tablet 40 milliGRAM(s) Oral before breakfast  saccharomyces boulardii 250 milliGRAM(s) Oral two times a day  spironolactone 12.5 milliGRAM(s) Oral daily  vancomycin  IVPB 1250 milliGRAM(s) IV Intermittent every 8 hours  vancomycin  IVPB        MEDICATIONS  (PRN):  acetaminophen   Tablet .. 650 milliGRAM(s) Oral every 6 hours PRN Temp greater or equal to 38C (100.4F), Moderate Pain (4 - 6)  ALBUTerol    0.083% 2.5 milliGRAM(s) Nebulizer every 4 hours PRN Shortness of Breath and/or Wheezing  ALPRAZolam 0.25 milliGRAM(s) Oral three times a day PRN Anxiety/ Agitation  dextrose 40% Gel 15 Gram(s) Oral once PRN Blood Glucose LESS THAN 70 milliGRAM(s)/deciliter  glucagon  Injectable 1 milliGRAM(s) IntraMuscular once PRN Glucose LESS THAN 70 milligrams/deciliter  mirtazapine 7.5 milliGRAM(s) Oral at bedtime PRN Sleep  oxyCODONE    5 mG/acetaminophen 325 mG 1 Tablet(s) Oral every 6 hours PRN mild - moderate pain  oxyCODONE    5 mG/acetaminophen 325 mG 2 Tablet(s) Oral every 6 hours PRN Severe Pain (7 - 10)      Vital Signs Last 24 Hrs  T(C): 36.8 (27 Oct 2018 11:45), Max: 37.1 (26 Oct 2018 16:58)  T(F): 98.3 (27 Oct 2018 11:45), Max: 98.7 (26 Oct 2018 16:58)  HR: 88 (27 Oct 2018 11:45) (67 - 90)  BP: 100/68 (27 Oct 2018 11:45) (91/67 - 101/69)  BP(mean): --  RR: 18 (27 Oct 2018 11:45) (18 - 21)  SpO2: 93% (27 Oct 2018 09:11) (93% - 98%)    gen: nad, a&ox3  cv: rrr  resp: nonlabored breathing  gi: soft, nd, nttp  skin: posterior neck 2cm open wound with minimal serous drainage.       I&O's Detail    26 Oct 2018 07:01  -  27 Oct 2018 07:00  --------------------------------------------------------  IN:    Oral Fluid: 480 mL  Total IN: 480 mL    OUT:  Total OUT: 0 mL    Total NET: 480 mL      27 Oct 2018 07:01  -  27 Oct 2018 13:34  --------------------------------------------------------  IN:    Solution: 250 mL  Total IN: 250 mL    OUT:  Total OUT: 0 mL    Total NET: 250 mL          LABS:                        11.9   9.3   )-----------( 162      ( 27 Oct 2018 08:23 )             37.1

## 2018-10-27 NOTE — PROGRESS NOTE ADULT - ASSESSMENT
>Neck Cellulitis with large abscess, s/p I&D - Continue Vancomycin, Ceftriaxone until 10/29 per ID.    > Acute on chronic hypercapnic and hypoxic respiratory failure - Multifactorial (COPD Exacerbation, Pulmonary HTN, and Diastolic Heart Failure) - RVP is positive for Rhinovirus.  Completed steroids, continue O2 supplements (Home o2 dependent), encourage OOB. Smoking cessation emphasized. O2NC 2L now. Per SW/ CCM Pt renting room and his landlord will not allow him on oxygen tank given active smoking.  Awaiting TACO placement  > DMII on long term insulin therapy - FS elevated.  Increase Lantus BID.  Continue Premeal insulin and insulin coverage.    > Smoker - Nicotine Patch. Smoking cessation counseled.   > Acute on chronic Diastolic CHF - C/w aldactone, Lisinopril. Metoprolol d/ilir due to pause by cardiology.   > COPD/ PHTN- Plan as above, O2 supplements, Prednisone taper, Duoneb. Pulmonology signed off.   > CAD and HLD - Continue Aspirin, Plavix, Lipitor. Metoprolol d/ilir by cardio as above.   > Anxiety / mood / Sleep disorder- Seen by Psych, started on Xanax/ Remeron.    > DVT Prophylaxis -- Lovenox 40 mg  > Dispo- Planning TACO placement Monday upon completion of IV Abx.

## 2018-10-27 NOTE — PROGRESS NOTE ADULT - SUBJECTIVE AND OBJECTIVE BOX
Patient: DEVIKA CARBALLO [id number] 58y Male                           Internal Medicine Hospitalist Progress Note  Chief Complaint: Patient is a 58y old  Male who presents with a chief complaint of shortness of breath (22 Oct 2018 08:32)    Initial HPI:  Pt is 59 yo male with PMHx of pulmonary HTN, GERD, Bipolar disorder, HTN, HLD, current everyday 0.5 PPD smoker, DM-II on insulin, severe COPD/emphysema on home oxygen 2L, CHF with last exacerbation in 2015 with CAD s/p PCI in [place] in ? 2015/2016, who presented with complaints of worsening shortness of breath and congestion. Found to have a positive rhinovirus on viral panel. Admitted for COPD exacerbation / viral pneumonia. Responded to IV steroids, nebulizers and clinically improved. Evaluated by cardiology/ pulmonology and recommendation appreciated. Pt medically stable and pending level 2 clearance for T[facility]O placement. Pt noted to have jump in wbc, not septic, UA positive, neck with some erythema and lump, neck xray unremarkable Pt started on PO Augmentin to cover possible UTI and skin.    Interval History:  Remains on O2 via NC.  Per SW, awaiting placement in [facility] as pt unable to return to his living situation on O2, active smoker, poor insight to his condition.  He complained of lower posterior neck pain.   CT neck confirmed Posterior neck subcutaneouscomplex abscess.  Seen by general surgery, s/p I&D with additional exploration 10/24.  Antibiotics changed by ID.     Today denies complaints.  Agreeable to continued antibiotic therapy.  No fever / chills.  No SOB.  Remains on O2.      ____________________PHYSICAL EXAM:  Vitals reviewed as indicated below  GENERAL:  NAD Alert and Oriented x 3   HEENT: NCAT  CARDIOVASCULAR:  S1, S2  LUNGS: coarse BS b/l  ABDOMEN:  soft, (-) tenderness, (-) distension, (+) bowel sounds, (-) guarding, (-) rebound (-) rigidity  EXTREMITIES:  no cyanosis / clubbing / edema.   NECK: posterior lower neck erythema.  Surgical incision with minimal drainage.   ____________________  VITALS:  Vital Signs Last 24 Hrs  T(C): 36.8 (27 Oct 2018 11:45), Max: 37.1 (26 Oct 2018 16:58)  T(F): 98.3 (27 Oct 2018 11:45), Max: 98.7 (26 Oct 2018 16:58)  HR: 88 (27 Oct 2018 11:45) (67 - 90)  BP: 100/68 (27 Oct 2018 11:45) (91/67 - 101/69)  BP(mean): --  RR: 18 (27 Oct 2018 11:45) (18 - 21)  SpO2: 93% (27 Oct 2018 09:11) (93% - 98%) Daily     Daily   CAPILLARY BLOOD GLUCOSE      POCT Blood Glucose.: 176 mg/dL (27 Oct 2018 11:22)  POCT Blood Glucose.: 298 mg/dL (27 Oct 2018 07:32)  POCT Blood Glucose.: 261 mg/dL (26 Oct 2018 21:46)  POCT Blood Glucose.: 331 mg/dL (26 Oct 2018 16:37)    I&O's Summary    26 Oct 2018 07:01  -  27 Oct 2018 07:00  --------------------------------------------------------  IN: 480 mL / OUT: 0 mL / NET: 480 mL    27 Oct 2018 07:01  -  27 Oct 2018 13:37  --------------------------------------------------------  IN: 250 mL / OUT: 0 mL / NET: 250 mL        LABS:                        11.9   9.3   )-----------( 162      ( 27 Oct 2018 08:23 )             37.1                       MEDICATIONS:  acetaminophen   Tablet .. 650 milliGRAM(s) Oral every 6 hours PRN  ALBUTerol    0.083% 2.5 milliGRAM(s) Nebulizer every 4 hours PRN  ALBUTerol/ipratropium for Nebulization 3 milliLiter(s) Nebulizer every 6 hours  ALPRAZolam 0.25 milliGRAM(s) Oral three times a day PRN  aspirin enteric coated 81 milliGRAM(s) Oral daily  atorvastatin 40 milliGRAM(s) Oral at bedtime  cefTRIAXone   IVPB 1 Gram(s) IV Intermittent every 24 hours  clopidogrel Tablet 75 milliGRAM(s) Oral daily  dextrose 40% Gel 15 Gram(s) Oral once PRN  dextrose 5%. 1000 milliLiter(s) IV Continuous <Continuous>  dextrose 50% Injectable 12.5 Gram(s) IV Push once  dextrose 50% Injectable 25 Gram(s) IV Push once  dextrose 50% Injectable 25 Gram(s) IV Push once  enoxaparin Injectable 40 milliGRAM(s) SubCutaneous daily  glucagon  Injectable 1 milliGRAM(s) IntraMuscular once PRN  insulin glargine Injectable (LANTUS) 40 Unit(s) SubCutaneous two times a day  insulin lispro (HumaLOG) corrective regimen sliding scale   SubCutaneous Before meals and at bedtime  insulin lispro Injectable (HumaLOG) 10 Unit(s) SubCutaneous three times a day before meals  lidocaine 1%/EPINEPHrine 1:100,000 Inj 20 milliLiter(s) Local Injection once  lisinopril 2.5 milliGRAM(s) Oral daily  mirtazapine 7.5 milliGRAM(s) Oral at bedtime PRN  nicotine - 21 mG/24Hr(s) Patch 1 patch Transdermal daily  nystatin    Suspension 588846 Unit(s) Oral four times a day  oxyCODONE    5 mG/acetaminophen 325 mG 1 Tablet(s) Oral every 6 hours PRN  oxyCODONE    5 mG/acetaminophen 325 mG 2 Tablet(s) Oral every 6 hours PRN  pantoprazole    Tablet 40 milliGRAM(s) Oral before breakfast  saccharomyces boulardii 250 milliGRAM(s) Oral two times a day  spironolactone 12.5 milliGRAM(s) Oral daily  vancomycin  IVPB 1250 milliGRAM(s) IV Intermittent every 8 hours  vancomycin  IVPB

## 2018-10-27 NOTE — PROGRESS NOTE ADULT - ASSESSMENT
58yM s/p I&D of posterior neck abscess  - sterile 4x4 dressing applied  - wound is c/d/i  - rest of care per primary team  - continue abx per ID  - surgery will sign off

## 2018-10-28 LAB
GLUCOSE BLDC GLUCOMTR-MCNC: 170 MG/DL — HIGH (ref 70–99)
GLUCOSE BLDC GLUCOMTR-MCNC: 183 MG/DL — HIGH (ref 70–99)
GLUCOSE BLDC GLUCOMTR-MCNC: 244 MG/DL — HIGH (ref 70–99)
GLUCOSE BLDC GLUCOMTR-MCNC: 298 MG/DL — HIGH (ref 70–99)
HCT VFR BLD CALC: 37.7 % — LOW (ref 42–52)
HGB BLD-MCNC: 12 G/DL — LOW (ref 14–18)
MCHC RBC-ENTMCNC: 28.5 PG — SIGNIFICANT CHANGE UP (ref 27–31)
MCHC RBC-ENTMCNC: 31.8 G/DL — LOW (ref 32–36)
MCV RBC AUTO: 89.5 FL — SIGNIFICANT CHANGE UP (ref 80–94)
PLATELET # BLD AUTO: 173 K/UL — SIGNIFICANT CHANGE UP (ref 150–400)
RBC # BLD: 4.21 M/UL — LOW (ref 4.6–6.2)
RBC # FLD: 14.4 % — SIGNIFICANT CHANGE UP (ref 11–15.6)
WBC # BLD: 8.6 K/UL — SIGNIFICANT CHANGE UP (ref 4.8–10.8)
WBC # FLD AUTO: 8.6 K/UL — SIGNIFICANT CHANGE UP (ref 4.8–10.8)

## 2018-10-28 PROCEDURE — 99232 SBSQ HOSP IP/OBS MODERATE 35: CPT

## 2018-10-28 RX ORDER — LISINOPRIL 2.5 MG/1
1 TABLET ORAL
Qty: 0 | Refills: 0 | COMMUNITY

## 2018-10-28 RX ORDER — PIOGLITAZONE HYDROCHLORIDE 15 MG/1
0 TABLET ORAL
Qty: 0 | Refills: 0 | COMMUNITY

## 2018-10-28 RX ORDER — METOPROLOL TARTRATE 50 MG
0 TABLET ORAL
Qty: 0 | Refills: 0 | COMMUNITY

## 2018-10-28 RX ORDER — ALBUTEROL 90 UG/1
2.5 AEROSOL, METERED ORAL
Qty: 0 | Refills: 0 | DISCHARGE
Start: 2018-10-28

## 2018-10-28 RX ORDER — CEFTRIAXONE 500 MG/1
1 INJECTION, POWDER, FOR SOLUTION INTRAMUSCULAR; INTRAVENOUS
Qty: 0 | Refills: 0 | Status: COMPLETED | OUTPATIENT
Start: 2018-10-28 | End: 2018-10-29

## 2018-10-28 RX ORDER — NICOTINE POLACRILEX 2 MG
1 GUM BUCCAL
Qty: 0 | Refills: 0 | DISCHARGE
Start: 2018-10-28

## 2018-10-28 RX ORDER — LISINOPRIL 2.5 MG/1
1 TABLET ORAL
Qty: 0 | Refills: 0 | DISCHARGE
Start: 2018-10-28

## 2018-10-28 RX ORDER — ALPRAZOLAM 0.25 MG
1 TABLET ORAL
Qty: 0 | Refills: 0 | DISCHARGE
Start: 2018-10-28

## 2018-10-28 RX ORDER — ASPIRIN/CALCIUM CARB/MAGNESIUM 324 MG
0 TABLET ORAL
Qty: 0 | Refills: 0 | COMMUNITY

## 2018-10-28 RX ORDER — ALPRAZOLAM 0.25 MG
1 TABLET ORAL
Qty: 0 | Refills: 0 | COMMUNITY

## 2018-10-28 RX ORDER — DULOXETINE HYDROCHLORIDE 30 MG/1
30 CAPSULE, DELAYED RELEASE ORAL
Qty: 0 | Refills: 0 | COMMUNITY

## 2018-10-28 RX ORDER — INSULIN GLARGINE 100 [IU]/ML
40 INJECTION, SOLUTION SUBCUTANEOUS
Qty: 0 | Refills: 0 | DISCHARGE
Start: 2018-10-28

## 2018-10-28 RX ORDER — MIRTAZAPINE 45 MG/1
1 TABLET, ORALLY DISINTEGRATING ORAL
Qty: 0 | Refills: 0 | DISCHARGE
Start: 2018-10-28

## 2018-10-28 RX ORDER — SPIRONOLACTONE 25 MG/1
0.5 TABLET, FILM COATED ORAL
Qty: 0 | Refills: 0 | DISCHARGE
Start: 2018-10-28

## 2018-10-28 RX ORDER — INSULIN GLARGINE 100 [IU]/ML
8 INJECTION, SOLUTION SUBCUTANEOUS
Qty: 0 | Refills: 0 | COMMUNITY

## 2018-10-28 RX ORDER — ATORVASTATIN CALCIUM 80 MG/1
0 TABLET, FILM COATED ORAL
Qty: 0 | Refills: 0 | COMMUNITY

## 2018-10-28 RX ORDER — THEOPHYLLINE ANHYDROUS 200 MG
1 TABLET, EXTENDED RELEASE 12 HR ORAL
Qty: 0 | Refills: 0 | COMMUNITY

## 2018-10-28 RX ORDER — ATORVASTATIN CALCIUM 80 MG/1
1 TABLET, FILM COATED ORAL
Qty: 0 | Refills: 0 | DISCHARGE
Start: 2018-10-28

## 2018-10-28 RX ORDER — RISPERIDONE 4 MG/1
3 TABLET ORAL
Qty: 0 | Refills: 0 | COMMUNITY

## 2018-10-28 RX ADMIN — Medication 500000 UNIT(S): at 06:52

## 2018-10-28 RX ADMIN — CLOPIDOGREL BISULFATE 75 MILLIGRAM(S): 75 TABLET, FILM COATED ORAL at 11:44

## 2018-10-28 RX ADMIN — Medication 166.67 MILLIGRAM(S): at 13:30

## 2018-10-28 RX ADMIN — Medication 500000 UNIT(S): at 11:44

## 2018-10-28 RX ADMIN — Medication 4: at 08:49

## 2018-10-28 RX ADMIN — Medication 81 MILLIGRAM(S): at 11:44

## 2018-10-28 RX ADMIN — Medication 500000 UNIT(S): at 00:07

## 2018-10-28 RX ADMIN — Medication 3 MILLILITER(S): at 03:49

## 2018-10-28 RX ADMIN — Medication 10 UNIT(S): at 16:46

## 2018-10-28 RX ADMIN — OXYCODONE AND ACETAMINOPHEN 2 TABLET(S): 5; 325 TABLET ORAL at 13:30

## 2018-10-28 RX ADMIN — Medication 166.67 MILLIGRAM(S): at 21:13

## 2018-10-28 RX ADMIN — SPIRONOLACTONE 12.5 MILLIGRAM(S): 25 TABLET, FILM COATED ORAL at 06:52

## 2018-10-28 RX ADMIN — Medication 500000 UNIT(S): at 17:27

## 2018-10-28 RX ADMIN — Medication 2: at 11:45

## 2018-10-28 RX ADMIN — Medication 6: at 16:46

## 2018-10-28 RX ADMIN — CEFTRIAXONE 100 GRAM(S): 500 INJECTION, POWDER, FOR SOLUTION INTRAMUSCULAR; INTRAVENOUS at 14:19

## 2018-10-28 RX ADMIN — PANTOPRAZOLE SODIUM 40 MILLIGRAM(S): 20 TABLET, DELAYED RELEASE ORAL at 06:52

## 2018-10-28 RX ADMIN — Medication 3 MILLILITER(S): at 20:44

## 2018-10-28 RX ADMIN — OXYCODONE AND ACETAMINOPHEN 2 TABLET(S): 5; 325 TABLET ORAL at 01:08

## 2018-10-28 RX ADMIN — INSULIN GLARGINE 40 UNIT(S): 100 INJECTION, SOLUTION SUBCUTANEOUS at 21:13

## 2018-10-28 RX ADMIN — Medication 3 MILLILITER(S): at 15:15

## 2018-10-28 RX ADMIN — Medication 1 PATCH: at 11:45

## 2018-10-28 RX ADMIN — Medication 3 MILLILITER(S): at 09:27

## 2018-10-28 RX ADMIN — Medication 10 UNIT(S): at 11:45

## 2018-10-28 RX ADMIN — OXYCODONE AND ACETAMINOPHEN 2 TABLET(S): 5; 325 TABLET ORAL at 00:08

## 2018-10-28 RX ADMIN — Medication 10 UNIT(S): at 08:49

## 2018-10-28 RX ADMIN — Medication 250 MILLIGRAM(S): at 06:52

## 2018-10-28 RX ADMIN — Medication 166.67 MILLIGRAM(S): at 06:52

## 2018-10-28 RX ADMIN — ENOXAPARIN SODIUM 40 MILLIGRAM(S): 100 INJECTION SUBCUTANEOUS at 11:44

## 2018-10-28 RX ADMIN — Medication 500000 UNIT(S): at 23:27

## 2018-10-28 RX ADMIN — Medication 1 PATCH: at 17:28

## 2018-10-28 RX ADMIN — OXYCODONE AND ACETAMINOPHEN 2 TABLET(S): 5; 325 TABLET ORAL at 12:20

## 2018-10-28 RX ADMIN — Medication 1 PATCH: at 09:15

## 2018-10-28 RX ADMIN — Medication 1 PATCH: at 11:43

## 2018-10-28 RX ADMIN — OXYCODONE AND ACETAMINOPHEN 1 TABLET(S): 5; 325 TABLET ORAL at 18:18

## 2018-10-28 RX ADMIN — ATORVASTATIN CALCIUM 40 MILLIGRAM(S): 80 TABLET, FILM COATED ORAL at 21:13

## 2018-10-28 RX ADMIN — Medication 250 MILLIGRAM(S): at 17:23

## 2018-10-28 RX ADMIN — OXYCODONE AND ACETAMINOPHEN 1 TABLET(S): 5; 325 TABLET ORAL at 17:27

## 2018-10-28 RX ADMIN — OXYCODONE AND ACETAMINOPHEN 1 TABLET(S): 5; 325 TABLET ORAL at 23:27

## 2018-10-28 RX ADMIN — Medication 2: at 21:13

## 2018-10-28 NOTE — PROGRESS NOTE ADULT - SUBJECTIVE AND OBJECTIVE BOX
Patient: DEVIKA CARBALLO [id number] 58y Male                           Internal Medicine Hospitalist Progress Note  Chief Complaint: Patient is a 58y old  Male who presents with a chief complaint of shortness of breath (22 Oct 2018 08:32)    Initial HPI:  Pt is 57 yo male with PMHx of pulmonary HTN, GERD, Bipolar disorder, HTN, HLD, current everyday 0.5 PPD smoker, DM-II on insulin, severe COPD/emphysema on home oxygen 2L, CHF with last exacerbation in 2015 with CAD s/p PCI in [place] in ? 2015/2016, who presented with complaints of worsening shortness of breath and congestion. Found to have a positive rhinovirus on viral panel. Admitted for COPD exacerbation / viral pneumonia. Responded to IV steroids, nebulizers and clinically improved. Evaluated by cardiology/ pulmonology and recommendation appreciated. Pt medically stable and pending level 2 clearance for [facility] placement. Pt noted to have jump in wbc, not septic, UA positive, neck with some erythema and lump, neck xray unremarkable Pt started on PO Augmentin to cover possible UTI and skin.    Interval History:  Remains on O2 via NC.  Per SW, awaiting placement in [facility] as pt unable to return to his living situation on O2, active smoker, poor insight to his condition.  He complained of lower posterior neck pain.   CT neck confirmed Posterior neck subcutaneouscomplex abscess.  Seen by general surgery, s/p I&D with additional exploration 10/24.  Antibiotics changed by ID, to be completed 10/29    Today denies complaints.  Feeling "much better".  No fever / chills.  No SOB.  Remains on O2.      ____________________PHYSICAL EXAM:  Vitals reviewed as indicated below  GENERAL:  NAD Alert and Oriented x 3   HEENT: NCAT  CARDIOVASCULAR:  S1, S2  LUNGS: coarse BS b/l  ABDOMEN:  soft, (-) tenderness, (-) distension, (+) bowel sounds, (-) guarding, (-) rebound (-) rigidity  EXTREMITIES:  no cyanosis / clubbing / edema.   NECK: posterior lower neck erythema.  Surgical incision with minimal drainage.   ____________________    VITALS:  Vital Signs Last 24 Hrs  T(C): 36.7 (28 Oct 2018 05:13), Max: 36.8 (27 Oct 2018 11:45)  T(F): 98.1 (28 Oct 2018 05:13), Max: 98.3 (27 Oct 2018 11:45)  HR: 81 (28 Oct 2018 09:28) (65 - 102)  BP: 97/65 (28 Oct 2018 05:13) (90/47 - 103/65)  BP(mean): --  RR: 18 (28 Oct 2018 05:13) (16 - 19)  SpO2: 94% (28 Oct 2018 09:28) (90% - 96%) Daily     Daily   CAPILLARY BLOOD GLUCOSE      POCT Blood Glucose.: 244 mg/dL (28 Oct 2018 08:11)  POCT Blood Glucose.: 154 mg/dL (27 Oct 2018 21:48)  POCT Blood Glucose.: 181 mg/dL (27 Oct 2018 17:10)  POCT Blood Glucose.: 176 mg/dL (27 Oct 2018 11:22)    I&O's Summary    27 Oct 2018 07:01  -  28 Oct 2018 07:00  --------------------------------------------------------  IN: 1030 mL / OUT: 0 mL / NET: 1030 mL        LABS:                        12.0   8.6   )-----------( 173      ( 28 Oct 2018 08:21 )             37.7                       MEDICATIONS:  acetaminophen   Tablet .. 650 milliGRAM(s) Oral every 6 hours PRN  ALBUTerol    0.083% 2.5 milliGRAM(s) Nebulizer every 4 hours PRN  ALBUTerol/ipratropium for Nebulization 3 milliLiter(s) Nebulizer every 6 hours  ALPRAZolam 0.25 milliGRAM(s) Oral three times a day PRN  aspirin enteric coated 81 milliGRAM(s) Oral daily  atorvastatin 40 milliGRAM(s) Oral at bedtime  cefTRIAXone   IVPB 1 Gram(s) IV Intermittent <User Schedule>  clopidogrel Tablet 75 milliGRAM(s) Oral daily  dextrose 40% Gel 15 Gram(s) Oral once PRN  dextrose 5%. 1000 milliLiter(s) IV Continuous <Continuous>  dextrose 50% Injectable 12.5 Gram(s) IV Push once  dextrose 50% Injectable 25 Gram(s) IV Push once  dextrose 50% Injectable 25 Gram(s) IV Push once  enoxaparin Injectable 40 milliGRAM(s) SubCutaneous daily  glucagon  Injectable 1 milliGRAM(s) IntraMuscular once PRN  insulin glargine Injectable (LANTUS) 40 Unit(s) SubCutaneous two times a day  insulin lispro (HumaLOG) corrective regimen sliding scale   SubCutaneous Before meals and at bedtime  insulin lispro Injectable (HumaLOG) 10 Unit(s) SubCutaneous three times a day before meals  lidocaine 1%/EPINEPHrine 1:100,000 Inj 20 milliLiter(s) Local Injection once  lisinopril 2.5 milliGRAM(s) Oral daily  mirtazapine 7.5 milliGRAM(s) Oral at bedtime PRN  nicotine - 21 mG/24Hr(s) Patch 1 patch Transdermal daily  nystatin    Suspension 351243 Unit(s) Oral four times a day  oxyCODONE    5 mG/acetaminophen 325 mG 1 Tablet(s) Oral every 6 hours PRN  oxyCODONE    5 mG/acetaminophen 325 mG 2 Tablet(s) Oral every 6 hours PRN  pantoprazole    Tablet 40 milliGRAM(s) Oral before breakfast  saccharomyces boulardii 250 milliGRAM(s) Oral two times a day  spironolactone 12.5 milliGRAM(s) Oral daily  vancomycin  IVPB 1250 milliGRAM(s) IV Intermittent every 8 hours  vancomycin  IVPB

## 2018-10-28 NOTE — PROGRESS NOTE ADULT - ASSESSMENT
>Neck Cellulitis with large abscess, s/p I&D - Continue Vancomycin, Ceftriaxone until 10/29 per ID.    > Acute on chronic hypercapnic and hypoxic respiratory failure - Multifactorial (COPD Exacerbation, Pulmonary HTN, and Diastolic Heart Failure) - RVP is positive for Rhinovirus.  Completed steroids, continue O2 supplements (Home o2 dependent), encourage OOB. Smoking cessation emphasized. O2NC 2L now. Per SW/ CCM Pt renting room and his landlord will not allow him on oxygen tank given active smoking.  Awaiting [facility] placement  > DMII on long term insulin therapy - FS elevated.  Increase Lantus BID.  Continue Premeal insulin and insulin coverage.    > Smoker - Nicotine Patch. Smoking cessation counseled.   > Acute on chronic Diastolic CHF - C/w aldactone, Lisinopril. Metoprolol d/ilir due to pause by cardiology.   > COPD/ PHTN- Plan as above, O2 supplements, Prednisone taper, Duoneb. Pulmonology signed off.   > CAD and HLD - Continue Aspirin, Plavix, Lipitor. Metoprolol d/ilir by cardio as above.   > Anxiety / mood / Sleep disorder- Seen by Psych, started on Xanax/ Remeron.    > DVT Prophylaxis -- Lovenox 40 mg  > Dispo- Planning [facility] placement tomorrow completion of IV Abx.

## 2018-10-29 LAB
ANION GAP SERPL CALC-SCNC: 10 MMOL/L — SIGNIFICANT CHANGE UP (ref 5–17)
BUN SERPL-MCNC: 12 MG/DL — SIGNIFICANT CHANGE UP (ref 8–20)
CALCIUM SERPL-MCNC: 8.6 MG/DL — SIGNIFICANT CHANGE UP (ref 8.6–10.2)
CHLORIDE SERPL-SCNC: 94 MMOL/L — LOW (ref 98–107)
CO2 SERPL-SCNC: 29 MMOL/L — SIGNIFICANT CHANGE UP (ref 22–29)
CREAT SERPL-MCNC: 0.61 MG/DL — SIGNIFICANT CHANGE UP (ref 0.5–1.3)
GLUCOSE BLDC GLUCOMTR-MCNC: 153 MG/DL — HIGH (ref 70–99)
GLUCOSE BLDC GLUCOMTR-MCNC: 193 MG/DL — HIGH (ref 70–99)
GLUCOSE BLDC GLUCOMTR-MCNC: 199 MG/DL — HIGH (ref 70–99)
GLUCOSE BLDC GLUCOMTR-MCNC: 296 MG/DL — HIGH (ref 70–99)
GLUCOSE SERPL-MCNC: 315 MG/DL — HIGH (ref 70–115)
HCT VFR BLD CALC: 35.8 % — LOW (ref 42–52)
HGB BLD-MCNC: 11.7 G/DL — LOW (ref 14–18)
MCHC RBC-ENTMCNC: 29.4 PG — SIGNIFICANT CHANGE UP (ref 27–31)
MCHC RBC-ENTMCNC: 32.7 G/DL — SIGNIFICANT CHANGE UP (ref 32–36)
MCV RBC AUTO: 89.9 FL — SIGNIFICANT CHANGE UP (ref 80–94)
PLATELET # BLD AUTO: 157 K/UL — SIGNIFICANT CHANGE UP (ref 150–400)
POTASSIUM SERPL-MCNC: 4.4 MMOL/L — SIGNIFICANT CHANGE UP (ref 3.5–5.3)
POTASSIUM SERPL-SCNC: 4.4 MMOL/L — SIGNIFICANT CHANGE UP (ref 3.5–5.3)
RBC # BLD: 3.98 M/UL — LOW (ref 4.6–6.2)
RBC # FLD: 14.4 % — SIGNIFICANT CHANGE UP (ref 11–15.6)
SODIUM SERPL-SCNC: 133 MMOL/L — LOW (ref 135–145)
WBC # BLD: 8 K/UL — SIGNIFICANT CHANGE UP (ref 4.8–10.8)
WBC # FLD AUTO: 8 K/UL — SIGNIFICANT CHANGE UP (ref 4.8–10.8)

## 2018-10-29 PROCEDURE — 99232 SBSQ HOSP IP/OBS MODERATE 35: CPT

## 2018-10-29 PROCEDURE — 99239 HOSP IP/OBS DSCHRG MGMT >30: CPT

## 2018-10-29 RX ADMIN — Medication 3 MILLILITER(S): at 21:36

## 2018-10-29 RX ADMIN — Medication 500000 UNIT(S): at 06:03

## 2018-10-29 RX ADMIN — Medication 2: at 17:30

## 2018-10-29 RX ADMIN — OXYCODONE AND ACETAMINOPHEN 2 TABLET(S): 5; 325 TABLET ORAL at 08:59

## 2018-10-29 RX ADMIN — Medication 3 MILLILITER(S): at 15:33

## 2018-10-29 RX ADMIN — CEFTRIAXONE 100 GRAM(S): 500 INJECTION, POWDER, FOR SOLUTION INTRAMUSCULAR; INTRAVENOUS at 15:46

## 2018-10-29 RX ADMIN — Medication 3 MILLILITER(S): at 03:56

## 2018-10-29 RX ADMIN — OXYCODONE AND ACETAMINOPHEN 1 TABLET(S): 5; 325 TABLET ORAL at 00:29

## 2018-10-29 RX ADMIN — Medication 1 PATCH: at 12:02

## 2018-10-29 RX ADMIN — Medication 166.67 MILLIGRAM(S): at 21:19

## 2018-10-29 RX ADMIN — SPIRONOLACTONE 12.5 MILLIGRAM(S): 25 TABLET, FILM COATED ORAL at 06:04

## 2018-10-29 RX ADMIN — Medication 250 MILLIGRAM(S): at 17:30

## 2018-10-29 RX ADMIN — CLOPIDOGREL BISULFATE 75 MILLIGRAM(S): 75 TABLET, FILM COATED ORAL at 12:00

## 2018-10-29 RX ADMIN — Medication 10 UNIT(S): at 17:31

## 2018-10-29 RX ADMIN — ATORVASTATIN CALCIUM 40 MILLIGRAM(S): 80 TABLET, FILM COATED ORAL at 21:20

## 2018-10-29 RX ADMIN — Medication 166.67 MILLIGRAM(S): at 06:03

## 2018-10-29 RX ADMIN — Medication 6: at 07:53

## 2018-10-29 RX ADMIN — Medication 81 MILLIGRAM(S): at 12:00

## 2018-10-29 RX ADMIN — INSULIN GLARGINE 40 UNIT(S): 100 INJECTION, SOLUTION SUBCUTANEOUS at 21:20

## 2018-10-29 RX ADMIN — Medication 10 UNIT(S): at 07:54

## 2018-10-29 RX ADMIN — Medication 250 MILLIGRAM(S): at 06:04

## 2018-10-29 RX ADMIN — INSULIN GLARGINE 40 UNIT(S): 100 INJECTION, SOLUTION SUBCUTANEOUS at 07:52

## 2018-10-29 RX ADMIN — MIRTAZAPINE 7.5 MILLIGRAM(S): 45 TABLET, ORALLY DISINTEGRATING ORAL at 21:20

## 2018-10-29 RX ADMIN — PANTOPRAZOLE SODIUM 40 MILLIGRAM(S): 20 TABLET, DELAYED RELEASE ORAL at 06:04

## 2018-10-29 RX ADMIN — Medication 2: at 12:00

## 2018-10-29 RX ADMIN — ENOXAPARIN SODIUM 40 MILLIGRAM(S): 100 INJECTION SUBCUTANEOUS at 12:01

## 2018-10-29 RX ADMIN — Medication 2: at 21:20

## 2018-10-29 RX ADMIN — Medication 3 MILLILITER(S): at 09:11

## 2018-10-29 RX ADMIN — Medication 10 UNIT(S): at 12:00

## 2018-10-29 RX ADMIN — OXYCODONE AND ACETAMINOPHEN 2 TABLET(S): 5; 325 TABLET ORAL at 17:45

## 2018-10-29 RX ADMIN — OXYCODONE AND ACETAMINOPHEN 2 TABLET(S): 5; 325 TABLET ORAL at 17:30

## 2018-10-29 RX ADMIN — Medication 166.67 MILLIGRAM(S): at 17:34

## 2018-10-29 RX ADMIN — Medication 1 PATCH: at 06:03

## 2018-10-29 RX ADMIN — Medication 1 PATCH: at 12:01

## 2018-10-29 NOTE — PROGRESS NOTE ADULT - SUBJECTIVE AND OBJECTIVE BOX
Patient: DEVIKA CARBALLO [id number] 58y Male                           Internal Medicine Hospitalist Progress Note  Chief Complaint: Patient is a 58y old  Male who presents with a chief complaint of shortness of breath (22 Oct 2018 08:32)    Initial HPI:  Pt is 57 yo male with PMHx of pulmonary HTN, GERD, Bipolar disorder, HTN, HLD, current everyday 0.5 PPD smoker, DM-II on insulin, severe COPD/emphysema on home oxygen 2L, CHF with last exacerbation in 2015 with CAD s/p PCI in [place] in ? 2015/2016, who presented with complaints of worsening shortness of breath and congestion. Found to have a positive rhinovirus on viral panel. Admitted for COPD exacerbation / viral pneumonia. Responded to IV steroids, nebulizers and clinically improved. Evaluated by cardiology/ pulmonology and recommendation appreciated. Pt medically stable and pending level 2 clearance for [facility] placement. Pt noted to have jump in wbc, not septic, UA positive, neck with some erythema and lump, neck xray unremarkable Pt started on PO Augmentin to cover possible UTI and skin.    Interval History:  Remains on O2 via NC.  Per SW, awaiting placement in [facility] as pt unable to return to his living situation on O2, active smoker, poor insight to his condition.  He complained of lower posterior neck pain.   CT neck confirmed Posterior neck subcutaneouscomplex abscess.  Seen by general surgery, s/p I&D with additional exploration 10/24.  Antibiotics changed by ID, to be completed 10/29    Today denies complaints.  Feeling "much better".  No fever / chills.  No SOB.  Remains on O2.      ____________________PHYSICAL EXAM:  Vitals reviewed as indicated below  GENERAL:  NAD Alert and Oriented x 3   HEENT: NCAT  CARDIOVASCULAR:  S1, S2  LUNGS: coarse BS b/l  ABDOMEN:  soft, (-) tenderness, (-) distension, (+) bowel sounds, (-) guarding, (-) rebound (-) rigidity  EXTREMITIES:  no cyanosis / clubbing / edema.   NECK: posterior lower neck erythema.  Surgical incision with minimal drainage.   ____________________    VITALS:  Vital Signs Last 24 Hrs  T(C): 36.4 (29 Oct 2018 04:30), Max: 36.4 (29 Oct 2018 04:30)  T(F): 97.5 (29 Oct 2018 04:30), Max: 97.5 (29 Oct 2018 04:30)  HR: 87 (29 Oct 2018 04:30) (75 - 87)  BP: 101/68 (29 Oct 2018 04:30) (101/68 - 105/66)  BP(mean): --  RR: 18 (29 Oct 2018 04:30) (18 - 20)  SpO2: 97% (29 Oct 2018 04:30) (90% - 97%) Daily     Daily   CAPILLARY BLOOD GLUCOSE      POCT Blood Glucose.: 199 mg/dL (29 Oct 2018 11:35)  POCT Blood Glucose.: 296 mg/dL (29 Oct 2018 07:46)  POCT Blood Glucose.: 170 mg/dL (28 Oct 2018 21:11)  POCT Blood Glucose.: 298 mg/dL (28 Oct 2018 16:16)    I&O's Summary    28 Oct 2018 07:01  -  29 Oct 2018 07:00  --------------------------------------------------------  IN: 1514 mL / OUT: 0 mL / NET: 1514 mL        LABS:                        11.7   8.0   )-----------( 157      ( 29 Oct 2018 08:14 )             35.8     10-29    133<L>  |  94<L>  |  12.0  ----------------------------<  315<H>  4.4   |  29.0  |  0.61    Ca    8.6      29 Oct 2018 08:14                  MEDICATIONS:  acetaminophen   Tablet .. 650 milliGRAM(s) Oral every 6 hours PRN  ALBUTerol    0.083% 2.5 milliGRAM(s) Nebulizer every 4 hours PRN  ALBUTerol/ipratropium for Nebulization 3 milliLiter(s) Nebulizer every 6 hours  aspirin enteric coated 81 milliGRAM(s) Oral daily  atorvastatin 40 milliGRAM(s) Oral at bedtime  cefTRIAXone   IVPB 1 Gram(s) IV Intermittent <User Schedule>  clopidogrel Tablet 75 milliGRAM(s) Oral daily  dextrose 40% Gel 15 Gram(s) Oral once PRN  dextrose 5%. 1000 milliLiter(s) IV Continuous <Continuous>  dextrose 50% Injectable 12.5 Gram(s) IV Push once  dextrose 50% Injectable 25 Gram(s) IV Push once  dextrose 50% Injectable 25 Gram(s) IV Push once  enoxaparin Injectable 40 milliGRAM(s) SubCutaneous daily  glucagon  Injectable 1 milliGRAM(s) IntraMuscular once PRN  insulin glargine Injectable (LANTUS) 40 Unit(s) SubCutaneous two times a day  insulin lispro (HumaLOG) corrective regimen sliding scale   SubCutaneous Before meals and at bedtime  insulin lispro Injectable (HumaLOG) 10 Unit(s) SubCutaneous three times a day before meals  lidocaine 1%/EPINEPHrine 1:100,000 Inj 20 milliLiter(s) Local Injection once  lisinopril 2.5 milliGRAM(s) Oral daily  mirtazapine 7.5 milliGRAM(s) Oral at bedtime PRN  nicotine - 21 mG/24Hr(s) Patch 1 patch Transdermal daily  nystatin    Suspension [id number] Unit(s) Oral four times a day  oxyCODONE    5 mG/acetaminophen 325 mG 1 Tablet(s) Oral every 6 hours PRN  oxyCODONE    5 mG/acetaminophen 325 mG 2 Tablet(s) Oral every 6 hours PRN  pantoprazole    Tablet 40 milliGRAM(s) Oral before breakfast  saccharomyces boulardii 250 milliGRAM(s) Oral two times a day  spironolactone 12.5 milliGRAM(s) Oral daily  vancomycin  IVPB 1250 milliGRAM(s) IV Intermittent every 8 hours  vancomycin  IVPB

## 2018-10-29 NOTE — PROGRESS NOTE ADULT - ASSESSMENT
>Neck Cellulitis with large abscess, s/p I&D - Continue Vancomycin, Ceftriaxone to be completed today per ID.    > Acute on chronic hypercapnic and hypoxic respiratory failure - Multifactorial (COPD Exacerbation, Pulmonary HTN, and Diastolic Heart Failure) - RVP is positive for Rhinovirus.  Completed steroids, continue O2 supplements (Home o2 dependent), encourage OOB. Smoking cessation emphasized. O2NC 2L now. Per SW/ CCM Pt renting room and his landlord will not allow him on oxygen tank given active smoking.  Awaiting [facility] placement  > DMII on long term insulin therapy - FS elevated.  Increase Lantus BID.  Continue Premeal insulin and insulin coverage.    > Smoker - Nicotine Patch. Smoking cessation counseled.   > Acute on chronic Diastolic CHF - C/w aldactone, Lisinopril. Metoprolol d/ilir due to pause by cardiology.   > COPD/ PHTN- Plan as above, O2 supplements, Prednisone taper, Duoneb. Pulmonology signed off.   > CAD and HLD - Continue Aspirin, Plavix, Lipitor. Metoprolol d/ilir by cardio as above.   > Anxiety / mood / Sleep disorder- Seen by Psych, started on Xanax/ Remeron.    > DVT Prophylaxis -- Lovenox 40 mg  > Dispo- Plan TACO Today

## 2018-10-30 LAB
-  AMPICILLIN/SULBACTAM: SIGNIFICANT CHANGE UP
-  CEFAZOLIN: SIGNIFICANT CHANGE UP
-  CLINDAMYCIN: SIGNIFICANT CHANGE UP
-  ERYTHROMYCIN: SIGNIFICANT CHANGE UP
-  GENTAMICIN: SIGNIFICANT CHANGE UP
-  OXACILLIN: SIGNIFICANT CHANGE UP
-  PENICILLIN: SIGNIFICANT CHANGE UP
-  RIFAMPIN: SIGNIFICANT CHANGE UP
-  TETRACYCLINE: SIGNIFICANT CHANGE UP
-  TRIMETHOPRIM/SULFAMETHOXAZOLE: SIGNIFICANT CHANGE UP
-  VANCOMYCIN: SIGNIFICANT CHANGE UP
CULTURE RESULTS: SIGNIFICANT CHANGE UP
GLUCOSE BLDC GLUCOMTR-MCNC: 158 MG/DL — HIGH (ref 70–99)
GLUCOSE BLDC GLUCOMTR-MCNC: 180 MG/DL — HIGH (ref 70–99)
GLUCOSE BLDC GLUCOMTR-MCNC: 202 MG/DL — HIGH (ref 70–99)
GLUCOSE BLDC GLUCOMTR-MCNC: 234 MG/DL — HIGH (ref 70–99)
HCT VFR BLD CALC: 37 % — LOW (ref 42–52)
HGB BLD-MCNC: 12 G/DL — LOW (ref 14–18)
MCHC RBC-ENTMCNC: 28.9 PG — SIGNIFICANT CHANGE UP (ref 27–31)
MCHC RBC-ENTMCNC: 32.4 G/DL — SIGNIFICANT CHANGE UP (ref 32–36)
MCV RBC AUTO: 89.2 FL — SIGNIFICANT CHANGE UP (ref 80–94)
METHOD TYPE: SIGNIFICANT CHANGE UP
ORGANISM # SPEC MICROSCOPIC CNT: SIGNIFICANT CHANGE UP
ORGANISM # SPEC MICROSCOPIC CNT: SIGNIFICANT CHANGE UP
PLATELET # BLD AUTO: 191 K/UL — SIGNIFICANT CHANGE UP (ref 150–400)
RBC # BLD: 4.15 M/UL — LOW (ref 4.6–6.2)
RBC # FLD: 14.4 % — SIGNIFICANT CHANGE UP (ref 11–15.6)
SPECIMEN SOURCE: SIGNIFICANT CHANGE UP
WBC # BLD: 8.6 K/UL — SIGNIFICANT CHANGE UP (ref 4.8–10.8)
WBC # FLD AUTO: 8.6 K/UL — SIGNIFICANT CHANGE UP (ref 4.8–10.8)

## 2018-10-30 PROCEDURE — 99232 SBSQ HOSP IP/OBS MODERATE 35: CPT

## 2018-10-30 RX ADMIN — ENOXAPARIN SODIUM 40 MILLIGRAM(S): 100 INJECTION SUBCUTANEOUS at 12:23

## 2018-10-30 RX ADMIN — Medication 2: at 08:09

## 2018-10-30 RX ADMIN — Medication 250 MILLIGRAM(S): at 05:33

## 2018-10-30 RX ADMIN — Medication 10 UNIT(S): at 08:09

## 2018-10-30 RX ADMIN — OXYCODONE AND ACETAMINOPHEN 2 TABLET(S): 5; 325 TABLET ORAL at 17:36

## 2018-10-30 RX ADMIN — MIRTAZAPINE 7.5 MILLIGRAM(S): 45 TABLET, ORALLY DISINTEGRATING ORAL at 21:23

## 2018-10-30 RX ADMIN — Medication 1 PATCH: at 12:24

## 2018-10-30 RX ADMIN — OXYCODONE AND ACETAMINOPHEN 2 TABLET(S): 5; 325 TABLET ORAL at 09:15

## 2018-10-30 RX ADMIN — OXYCODONE AND ACETAMINOPHEN 2 TABLET(S): 5; 325 TABLET ORAL at 08:54

## 2018-10-30 RX ADMIN — PANTOPRAZOLE SODIUM 40 MILLIGRAM(S): 20 TABLET, DELAYED RELEASE ORAL at 05:33

## 2018-10-30 RX ADMIN — Medication 10 UNIT(S): at 12:23

## 2018-10-30 RX ADMIN — OXYCODONE AND ACETAMINOPHEN 2 TABLET(S): 5; 325 TABLET ORAL at 17:45

## 2018-10-30 RX ADMIN — INSULIN GLARGINE 40 UNIT(S): 100 INJECTION, SOLUTION SUBCUTANEOUS at 21:22

## 2018-10-30 RX ADMIN — Medication 3 MILLILITER(S): at 03:09

## 2018-10-30 RX ADMIN — Medication 1 PATCH: at 08:12

## 2018-10-30 RX ADMIN — Medication 1 PATCH: at 06:13

## 2018-10-30 RX ADMIN — Medication 3 MILLILITER(S): at 20:43

## 2018-10-30 RX ADMIN — Medication 4: at 21:22

## 2018-10-30 RX ADMIN — Medication 81 MILLIGRAM(S): at 12:24

## 2018-10-30 RX ADMIN — Medication 500000 UNIT(S): at 23:23

## 2018-10-30 RX ADMIN — SPIRONOLACTONE 12.5 MILLIGRAM(S): 25 TABLET, FILM COATED ORAL at 05:33

## 2018-10-30 RX ADMIN — Medication 3 MILLILITER(S): at 08:57

## 2018-10-30 RX ADMIN — Medication 3 MILLILITER(S): at 15:40

## 2018-10-30 RX ADMIN — Medication 4: at 17:36

## 2018-10-30 RX ADMIN — CLOPIDOGREL BISULFATE 75 MILLIGRAM(S): 75 TABLET, FILM COATED ORAL at 12:23

## 2018-10-30 RX ADMIN — Medication 2: at 12:23

## 2018-10-30 RX ADMIN — Medication 250 MILLIGRAM(S): at 17:36

## 2018-10-30 RX ADMIN — INSULIN GLARGINE 40 UNIT(S): 100 INJECTION, SOLUTION SUBCUTANEOUS at 08:57

## 2018-10-30 RX ADMIN — ATORVASTATIN CALCIUM 40 MILLIGRAM(S): 80 TABLET, FILM COATED ORAL at 21:22

## 2018-10-30 RX ADMIN — OXYCODONE AND ACETAMINOPHEN 2 TABLET(S): 5; 325 TABLET ORAL at 09:10

## 2018-10-30 RX ADMIN — Medication 10 UNIT(S): at 17:36

## 2018-10-30 NOTE — PROGRESS NOTE ADULT - SUBJECTIVE AND OBJECTIVE BOX
Patient: DEVIKA CARBALLO [id number] 58y Male                           Internal Medicine Hospitalist Progress Note  Chief Complaint: Patient is a 58y old  Male who presents with a chief complaint of shortness of breath (22 Oct 2018 08:32)    Initial HPI:  Pt is 57 yo male with PMHx of pulmonary HTN, GERD, Bipolar disorder, HTN, HLD, current everyday 0.5 PPD smoker, DM-II on insulin, severe COPD/emphysema on home oxygen 2L, CHF with last exacerbation in  with CAD s/p PCI in [place] in ? /, who presented with complaints of worsening shortness of breath and congestion. Found to have a positive rhinovirus on viral panel. Admitted for COPD exacerbation / viral pneumonia. Responded to IV steroids, nebulizers and clinically improved. Evaluated by cardiology/ pulmonology and recommendation appreciated. Pt medically stable and pending level 2 clearance for [facility] placement. Pt noted to have jump in wbc, not septic, UA positive, neck with some erythema and lump, neck xray unremarkable Pt started on PO Augmentin to cover possible UTI and skin.    Interval History:  Remains on O2 via NC.  Per SW, awaiting placement in [facility] as pt unable to return to his living situation on O2, active smoker, poor insight to his condition.  He complained of lower posterior neck pain.   CT neck confirmed Posterior neck subcutaneouscomplex abscess.  Seen by general surgery, s/p I&D with additional exploration 10/24.  Antibiotics changed by ID, to be completed 10/29    Today denies complaints.  No pain.  No fever / chills.  No SOB.  Remains on O2.      ____________________PHYSICAL EXAM:  Vitals reviewed as indicated below  GENERAL:  NAD Alert and Oriented x 3   HEENT: NCAT  CARDIOVASCULAR:  S1, S2  LUNGS: coarse BS b/l  ABDOMEN:  soft, (-) tenderness, (-) distension, (+) bowel sounds, (-) guarding, (-) rebound (-) rigidity  EXTREMITIES:  no cyanosis / clubbing / edema.   NECK: posterior lower neck erythema.  Surgical incision with minimal drainage.   ____________________    VITALS:  Vital Signs Last 24 Hrs  T(C): 36.8 (30 Oct 2018 12:53), Max: 36.8 (30 Oct 2018 12:53)  T(F): 98.3 (30 Oct 2018 12:53), Max: 98.3 (30 Oct 2018 12:53)  HR: 79 (30 Oct 2018 15:42) (71 - 111)  BP: 98/69 (30 Oct 2018 12:53) (95/54 - 116/70)  BP(mean): --  RR: 19 (30 Oct 2018 12:53) (19 - 20)  SpO2: 97% (30 Oct 2018 12:53) (93% - 97%) Daily     Daily Weight in k.3 (30 Oct 2018 05:12)  CAPILLARY BLOOD GLUCOSE      POCT Blood Glucose.: 158 mg/dL (30 Oct 2018 12:02)  POCT Blood Glucose.: 180 mg/dL (30 Oct 2018 07:52)  POCT Blood Glucose.: 153 mg/dL (29 Oct 2018 21:19)  POCT Blood Glucose.: 193 mg/dL (29 Oct 2018 17:15)    I&O's Summary    29 Oct 2018 07:01  -  30 Oct 2018 07:00  --------------------------------------------------------  IN: 780 mL / OUT: 0 mL / NET: 780 mL        LABS:                        12.0   8.6   )-----------( 191      ( 30 Oct 2018 08:38 )             37.0     10-29    133<L>  |  94<L>  |  12.0  ----------------------------<  315<H>  4.4   |  29.0  |  0.61    Ca    8.6      29 Oct 2018 08:14                  MEDICATIONS:  acetaminophen   Tablet .. 650 milliGRAM(s) Oral every 6 hours PRN  ALBUTerol    0.083% 2.5 milliGRAM(s) Nebulizer every 4 hours PRN  ALBUTerol/ipratropium for Nebulization 3 milliLiter(s) Nebulizer every 6 hours  aspirin enteric coated 81 milliGRAM(s) Oral daily  atorvastatin 40 milliGRAM(s) Oral at bedtime  clopidogrel Tablet 75 milliGRAM(s) Oral daily  dextrose 40% Gel 15 Gram(s) Oral once PRN  dextrose 5%. 1000 milliLiter(s) IV Continuous <Continuous>  dextrose 50% Injectable 12.5 Gram(s) IV Push once  dextrose 50% Injectable 25 Gram(s) IV Push once  dextrose 50% Injectable 25 Gram(s) IV Push once  enoxaparin Injectable 40 milliGRAM(s) SubCutaneous daily  glucagon  Injectable 1 milliGRAM(s) IntraMuscular once PRN  insulin glargine Injectable (LANTUS) 40 Unit(s) SubCutaneous two times a day  insulin lispro (HumaLOG) corrective regimen sliding scale   SubCutaneous Before meals and at bedtime  insulin lispro Injectable (HumaLOG) 10 Unit(s) SubCutaneous three times a day before meals  lidocaine 1%/EPINEPHrine 1:100,000 Inj 20 milliLiter(s) Local Injection once  lisinopril 2.5 milliGRAM(s) Oral daily  mirtazapine 7.5 milliGRAM(s) Oral at bedtime PRN  nicotine - 21 mG/24Hr(s) Patch 1 patch Transdermal daily  nystatin    Suspension [id number] Unit(s) Oral four times a day  oxyCODONE    5 mG/acetaminophen 325 mG 1 Tablet(s) Oral every 6 hours PRN  oxyCODONE    5 mG/acetaminophen 325 mG 2 Tablet(s) Oral every 6 hours PRN  pantoprazole    Tablet 40 milliGRAM(s) Oral before breakfast  saccharomyces boulardii 250 milliGRAM(s) Oral two times a day  spironolactone 12.5 milliGRAM(s) Oral daily

## 2018-10-30 NOTE — CHART NOTE - NSCHARTNOTEFT_GEN_A_CORE
Source: Patient [ ]  Family [ ]   other [x ] EMR; pt asleep upon f/u assessment    Current Diet: Consistent Carbohydrate/No Snack; DASH/TLC    PO intake:  < 50% [ ]   50-75%  [x ]   %  [ ]  other :    Current Weight:   10/30 - 200#  10/23 - 195.8#  10/16 - 195#  10/12 - 194.4#    % Weight Change - no wt loss noted    Pertinent Medications: MEDICATIONS  (STANDING):  ALBUTerol/ipratropium for Nebulization 3 milliLiter(s) Nebulizer every 6 hours  aspirin enteric coated 81 milliGRAM(s) Oral daily  atorvastatin 40 milliGRAM(s) Oral at bedtime  clopidogrel Tablet 75 milliGRAM(s) Oral daily  dextrose 5%. 1000 milliLiter(s) (50 mL/Hr) IV Continuous <Continuous>  dextrose 50% Injectable 12.5 Gram(s) IV Push once  dextrose 50% Injectable 25 Gram(s) IV Push once  dextrose 50% Injectable 25 Gram(s) IV Push once  enoxaparin Injectable 40 milliGRAM(s) SubCutaneous daily  insulin glargine Injectable (LANTUS) 40 Unit(s) SubCutaneous two times a day  insulin lispro (HumaLOG) corrective regimen sliding scale   SubCutaneous Before meals and at bedtime  insulin lispro Injectable (HumaLOG) 10 Unit(s) SubCutaneous three times a day before meals  lidocaine 1%/EPINEPHrine 1:100,000 Inj 20 milliLiter(s) Local Injection once  lisinopril 2.5 milliGRAM(s) Oral daily  nicotine - 21 mG/24Hr(s) Patch 1 patch Transdermal daily  nystatin    Suspension 542616 Unit(s) Oral four times a day  pantoprazole    Tablet 40 milliGRAM(s) Oral before breakfast  saccharomyces boulardii 250 milliGRAM(s) Oral two times a day  spironolactone 12.5 milliGRAM(s) Oral daily    MEDICATIONS  (PRN):  acetaminophen   Tablet .. 650 milliGRAM(s) Oral every 6 hours PRN Temp greater or equal to 38C (100.4F), Moderate Pain (4 - 6)  ALBUTerol    0.083% 2.5 milliGRAM(s) Nebulizer every 4 hours PRN Shortness of Breath and/or Wheezing  dextrose 40% Gel 15 Gram(s) Oral once PRN Blood Glucose LESS THAN 70 milliGRAM(s)/deciliter  glucagon  Injectable 1 milliGRAM(s) IntraMuscular once PRN Glucose LESS THAN 70 milligrams/deciliter  mirtazapine 7.5 milliGRAM(s) Oral at bedtime PRN Sleep  oxyCODONE    5 mG/acetaminophen 325 mG 1 Tablet(s) Oral every 6 hours PRN mild - moderate pain  oxyCODONE    5 mG/acetaminophen 325 mG 2 Tablet(s) Oral every 6 hours PRN Severe Pain (7 - 10)    Pertinent Labs: CBC Full  -  ( 29 Oct 2018 08:14 )  WBC Count : 8.0 K/uL  Hemoglobin : 11.7 g/dL  Hematocrit : 35.8 %  Platelet Count - Automated : 157 K/uL  Mean Cell Volume : 89.9 fl  Mean Cell Hemoglobin : 29.4 pg  Mean Cell Hemoglobin Concentration : 32.7 g/dL    Skin: posterior neck abscess    Nutrition focused physical exam NOT conducted - found signs of malnutrition [ ]absent [ ]present    Subcutaneous fat loss: [ ] Orbital fat pads region, [ ]Buccal fat region, [ ]Triceps region,  [ ]Ribs region    Muscle wasting: [ ]Temples region, [ ]Clavicle region, [ ]Shoulder region, [ ]Scapula region, [ ]Interosseous region,  [ ]thigh region, [ ]Calf region    Estimated Needs:   [x ] no change since previous assessment  [ ] recalculated:     Current Nutrition Diagnosis:  Pt remains at nutritional risk secondary to impaired nutrient utilization related to uncontrolled Type 2 DM on insulin with severe COPD/emphysema and heart failure as evidenced by elevated HgA1c of 10.8%, Glucose 315, and FS readings of 154-298. Pt has been F/u with DM education several times. Pt was noted to be on steroids until 10/23. Pt educated on HF diet recommendations during initial visit. Pt noted to be eating well. Pt with posterior neck abscess s/p I&D 10/24; CT neck confirmed Posterior neck subcutaneouscomplex abscess. Plan to DC to [facility].    Recommendations:   1. Continue Diet Rx    Monitoring and Evaluation:   [x ] PO intake [x ] Tolerance to diet prescription [X] Weights  [X] Follow up per protocol [X] Labs: Source: Patient [ ]  Family [ ]   other [x ] EMR; pt asleep upon f/u assessment    Current Diet: Consistent Carbohydrate/No Snack; DASH/TLC    PO intake:  < 50% [ ]   50-75%  [x ]   %  [ ]  other :    Current Weight:   10/30 - 200#  10/23 - 195.8#  10/16 - 195#  10/12 - 194.4#    % Weight Change - wt remains relatively stable. no wt loss noted    Pertinent Medications: MEDICATIONS  (STANDING):  ALBUTerol/ipratropium for Nebulization 3 milliLiter(s) Nebulizer every 6 hours  aspirin enteric coated 81 milliGRAM(s) Oral daily  atorvastatin 40 milliGRAM(s) Oral at bedtime  clopidogrel Tablet 75 milliGRAM(s) Oral daily  dextrose 5%. 1000 milliLiter(s) (50 mL/Hr) IV Continuous <Continuous>  dextrose 50% Injectable 12.5 Gram(s) IV Push once  dextrose 50% Injectable 25 Gram(s) IV Push once  dextrose 50% Injectable 25 Gram(s) IV Push once  enoxaparin Injectable 40 milliGRAM(s) SubCutaneous daily  insulin glargine Injectable (LANTUS) 40 Unit(s) SubCutaneous two times a day  insulin lispro (HumaLOG) corrective regimen sliding scale   SubCutaneous Before meals and at bedtime  insulin lispro Injectable (HumaLOG) 10 Unit(s) SubCutaneous three times a day before meals  lidocaine 1%/EPINEPHrine 1:100,000 Inj 20 milliLiter(s) Local Injection once  lisinopril 2.5 milliGRAM(s) Oral daily  nicotine - 21 mG/24Hr(s) Patch 1 patch Transdermal daily  nystatin    Suspension 183911 Unit(s) Oral four times a day  pantoprazole    Tablet 40 milliGRAM(s) Oral before breakfast  saccharomyces boulardii 250 milliGRAM(s) Oral two times a day  spironolactone 12.5 milliGRAM(s) Oral daily    MEDICATIONS  (PRN):  acetaminophen   Tablet .. 650 milliGRAM(s) Oral every 6 hours PRN Temp greater or equal to 38C (100.4F), Moderate Pain (4 - 6)  ALBUTerol    0.083% 2.5 milliGRAM(s) Nebulizer every 4 hours PRN Shortness of Breath and/or Wheezing  dextrose 40% Gel 15 Gram(s) Oral once PRN Blood Glucose LESS THAN 70 milliGRAM(s)/deciliter  glucagon  Injectable 1 milliGRAM(s) IntraMuscular once PRN Glucose LESS THAN 70 milligrams/deciliter  mirtazapine 7.5 milliGRAM(s) Oral at bedtime PRN Sleep  oxyCODONE    5 mG/acetaminophen 325 mG 1 Tablet(s) Oral every 6 hours PRN mild - moderate pain  oxyCODONE    5 mG/acetaminophen 325 mG 2 Tablet(s) Oral every 6 hours PRN Severe Pain (7 - 10)    Pertinent Labs: CBC Full  -  ( 29 Oct 2018 08:14 )  WBC Count : 8.0 K/uL  Hemoglobin : 11.7 g/dL  Hematocrit : 35.8 %  Platelet Count - Automated : 157 K/uL  Mean Cell Volume : 89.9 fl  Mean Cell Hemoglobin : 29.4 pg  Mean Cell Hemoglobin Concentration : 32.7 g/dL    Skin: posterior neck abscess    Nutrition focused physical exam NOT conducted - found signs of malnutrition [ ]absent [ ]present    Subcutaneous fat loss: [ ] Orbital fat pads region, [ ]Buccal fat region, [ ]Triceps region,  [ ]Ribs region    Muscle wasting: [ ]Temples region, [ ]Clavicle region, [ ]Shoulder region, [ ]Scapula region, [ ]Interosseous region,  [ ]thigh region, [ ]Calf region    Estimated Needs:   [x ] no change since previous assessment  [ ] recalculated:     Current Nutrition Diagnosis:  Pt remains at nutritional risk secondary to impaired nutrient utilization related to uncontrolled Type 2 DM on insulin with severe COPD/emphysema and heart failure as evidenced by elevated HgA1c of 10.8%, Glucose 315, and FS readings of 154-298. Pt has been F/u with DM education several times. Pt was noted to be on steroids until 10/23. Pt educated on HF diet recommendations during initial visit. Pt noted to be eating well. Pt with posterior neck abscess s/p I&D 10/24; CT neck confirmed Posterior neck subcutaneouscomplex abscess. Plan to DC to [facility].    Recommendations:   1. Continue Diet Rx    Monitoring and Evaluation:   [x ] PO intake [x ] Tolerance to diet prescription [X] Weights  [X] Follow up per protocol [X] Labs:

## 2018-10-30 NOTE — PROGRESS NOTE ADULT - ASSESSMENT
>Neck Cellulitis with large abscess, s/p I&D - Improved, now off IV Abx.    > Acute on chronic hypercapnic and hypoxic respiratory failure - Multifactorial (COPD Exacerbation, Pulmonary HTN, and Diastolic Heart Failure) - RVP is positive for Rhinovirus.  Completed steroids, continue O2 supplements (Home o2 dependent), encourage OOB. Smoking cessation emphasized. O2NC 2L now. Per SW/ CCM Pt renting room and his landlord will not allow him on oxygen tank given active smoking.  Awaiting [facility] placement  > DMII on long term insulin therapy - FS now well controlled on Lantus BID.  Continue Premeal insulin and insulin coverage.    > Smoker - Nicotine Patch. Smoking cessation counseled.   > Acute on chronic Diastolic CHF - C/w aldactone, Lisinopril. Metoprolol d/ilir due to pause by cardiology.   > COPD/ PHTN- Plan as above, O2 supplements, Prednisone taper, Duoneb. Pulmonology signed off.   > CAD and HLD - Continue Aspirin, Plavix, Lipitor. Metoprolol d/ilir by cardio as above.   > Anxiety / mood / Sleep disorder- Seen by Psych, started on Xanax/ Remeron.    > DVT Prophylaxis -- Lovenox 40 mg  > Dispo- Stable for [facility].  Discussed with CM.

## 2018-10-31 LAB
GLUCOSE BLDC GLUCOMTR-MCNC: 115 MG/DL — HIGH (ref 70–99)
GLUCOSE BLDC GLUCOMTR-MCNC: 129 MG/DL — HIGH (ref 70–99)
GLUCOSE BLDC GLUCOMTR-MCNC: 189 MG/DL — HIGH (ref 70–99)
GLUCOSE BLDC GLUCOMTR-MCNC: 221 MG/DL — HIGH (ref 70–99)
HCT VFR BLD CALC: 37.7 % — LOW (ref 42–52)
HGB BLD-MCNC: 12.4 G/DL — LOW (ref 14–18)
MCHC RBC-ENTMCNC: 29.6 PG — SIGNIFICANT CHANGE UP (ref 27–31)
MCHC RBC-ENTMCNC: 32.9 G/DL — SIGNIFICANT CHANGE UP (ref 32–36)
MCV RBC AUTO: 90 FL — SIGNIFICANT CHANGE UP (ref 80–94)
PLATELET # BLD AUTO: 176 K/UL — SIGNIFICANT CHANGE UP (ref 150–400)
RBC # BLD: 4.19 M/UL — LOW (ref 4.6–6.2)
RBC # FLD: 14.6 % — SIGNIFICANT CHANGE UP (ref 11–15.6)
WBC # BLD: 8.1 K/UL — SIGNIFICANT CHANGE UP (ref 4.8–10.8)
WBC # FLD AUTO: 8.1 K/UL — SIGNIFICANT CHANGE UP (ref 4.8–10.8)

## 2018-10-31 PROCEDURE — 99232 SBSQ HOSP IP/OBS MODERATE 35: CPT

## 2018-10-31 RX ORDER — OXYCODONE AND ACETAMINOPHEN 5; 325 MG/1; MG/1
1 TABLET ORAL EVERY 6 HOURS
Qty: 0 | Refills: 0 | Status: DISCONTINUED | OUTPATIENT
Start: 2018-10-31 | End: 2018-11-01

## 2018-10-31 RX ORDER — ACETAMINOPHEN 500 MG
650 TABLET ORAL EVERY 6 HOURS
Qty: 0 | Refills: 0 | Status: DISCONTINUED | OUTPATIENT
Start: 2018-10-31 | End: 2018-11-01

## 2018-10-31 RX ADMIN — OXYCODONE AND ACETAMINOPHEN 1 TABLET(S): 5; 325 TABLET ORAL at 21:50

## 2018-10-31 RX ADMIN — Medication 3 MILLILITER(S): at 08:44

## 2018-10-31 RX ADMIN — Medication 10 UNIT(S): at 08:11

## 2018-10-31 RX ADMIN — LISINOPRIL 2.5 MILLIGRAM(S): 2.5 TABLET ORAL at 05:32

## 2018-10-31 RX ADMIN — Medication 650 MILLIGRAM(S): at 06:33

## 2018-10-31 RX ADMIN — Medication 250 MILLIGRAM(S): at 17:34

## 2018-10-31 RX ADMIN — INSULIN GLARGINE 40 UNIT(S): 100 INJECTION, SOLUTION SUBCUTANEOUS at 21:51

## 2018-10-31 RX ADMIN — SPIRONOLACTONE 12.5 MILLIGRAM(S): 25 TABLET, FILM COATED ORAL at 05:31

## 2018-10-31 RX ADMIN — Medication 250 MILLIGRAM(S): at 05:31

## 2018-10-31 RX ADMIN — OXYCODONE AND ACETAMINOPHEN 1 TABLET(S): 5; 325 TABLET ORAL at 12:20

## 2018-10-31 RX ADMIN — PANTOPRAZOLE SODIUM 40 MILLIGRAM(S): 20 TABLET, DELAYED RELEASE ORAL at 05:32

## 2018-10-31 RX ADMIN — INSULIN GLARGINE 40 UNIT(S): 100 INJECTION, SOLUTION SUBCUTANEOUS at 08:11

## 2018-10-31 RX ADMIN — ENOXAPARIN SODIUM 40 MILLIGRAM(S): 100 INJECTION SUBCUTANEOUS at 12:19

## 2018-10-31 RX ADMIN — Medication 1 PATCH: at 06:07

## 2018-10-31 RX ADMIN — Medication 1 PATCH: at 12:19

## 2018-10-31 RX ADMIN — CLOPIDOGREL BISULFATE 75 MILLIGRAM(S): 75 TABLET, FILM COATED ORAL at 12:19

## 2018-10-31 RX ADMIN — Medication 3 MILLILITER(S): at 15:22

## 2018-10-31 RX ADMIN — Medication 81 MILLIGRAM(S): at 12:19

## 2018-10-31 RX ADMIN — Medication 650 MILLIGRAM(S): at 05:33

## 2018-10-31 RX ADMIN — Medication 3 MILLILITER(S): at 02:59

## 2018-10-31 RX ADMIN — OXYCODONE AND ACETAMINOPHEN 1 TABLET(S): 5; 325 TABLET ORAL at 12:45

## 2018-10-31 RX ADMIN — Medication 1 PATCH: at 12:21

## 2018-10-31 RX ADMIN — Medication 2: at 21:51

## 2018-10-31 RX ADMIN — ATORVASTATIN CALCIUM 40 MILLIGRAM(S): 80 TABLET, FILM COATED ORAL at 21:50

## 2018-10-31 RX ADMIN — Medication 3 MILLILITER(S): at 21:08

## 2018-10-31 RX ADMIN — Medication 4: at 17:34

## 2018-10-31 RX ADMIN — Medication 10 UNIT(S): at 12:18

## 2018-10-31 RX ADMIN — Medication 10 UNIT(S): at 17:35

## 2018-10-31 NOTE — PROGRESS NOTE ADULT - SUBJECTIVE AND OBJECTIVE BOX
Patient: DEVIKA CARBALLO [id number] 58y Male                           Internal Medicine Hospitalist Progress Note  Chief Complaint: Patient is a 58y old  Male who presents with a chief complaint of shortness of breath (22 Oct 2018 08:32)    Initial HPI:  Pt is 57 yo male with PMHx of pulmonary HTN, GERD, Bipolar disorder, HTN, HLD, current everyday 0.5 PPD smoker, DM-II on insulin, severe COPD/emphysema on home oxygen 2L, CHF with last exacerbation in  with CAD s/p PCI in [place] in ? /, who presented with complaints of worsening shortness of breath and congestion. Found to have a positive rhinovirus on viral panel. Admitted for COPD exacerbation / viral pneumonia. Responded to IV steroids, nebulizers and clinically improved. Evaluated by cardiology/ pulmonology and recommendation appreciated. Pt medically stable and pending level 2 clearance for [facility] placement. Pt noted to have jump in wbc, not septic, UA positive, neck with some erythema and lump, neck xray unremarkable Pt started on PO Augmentin to cover possible UTI and skin.    Interval History:  Remains on O2 via NC.  Per SW, awaiting placement in [facility] as pt unable to return to his living situation on O2, active smoker, poor insight to his condition.  He complained of lower posterior neck pain.   CT neck confirmed Posterior neck subcutaneouscomplex abscess.  Seen by general surgery, s/p I&D with additional exploration 10/24.  Antibiotics changed by ID, completed 10/29.  His ambulatory status improved, now not a candidate for [facility].  SW arranging for LTC placement.    Today, seen with sister Tonie at bedside.  Pt offers no complaints.  No fevers noted.  Remains on O2.      ____________________PHYSICAL EXAM:  Vitals reviewed as indicated below  GENERAL:  NAD Alert and Oriented x 3   HEENT: NCAT  CARDIOVASCULAR:  S1, S2  LUNGS: coarse BS b/l  ABDOMEN:  soft, (-) tenderness, (-) distension, (+) bowel sounds, (-) guarding, (-) rebound (-) rigidity  EXTREMITIES:  no cyanosis / clubbing / edema.   NECK: posterior lower neck erythema.  Healing Surgical incision with minimal drainage.   ____________________    VITALS:  Vital Signs Last 24 Hrs  T(C): 37.1 (31 Oct 2018 11:03), Max: 37.1 (31 Oct 2018 11:03)  T(F): 98.7 (31 Oct 2018 11:03), Max: 98.7 (31 Oct 2018 11:03)  HR: 80 (31 Oct 2018 11:03) (68 - 89)  BP: 99/60 (31 Oct 2018 11:03) (99/60 - 105/65)  BP(mean): --  RR: 18 (31 Oct 2018 11:03) (18 - 20)  SpO2: 98% (31 Oct 2018 08:47) (94% - 99%) Daily     Daily Weight in k.7 (31 Oct 2018 04:14)  CAPILLARY BLOOD GLUCOSE      POCT Blood Glucose.: 129 mg/dL (31 Oct 2018 11:59)  POCT Blood Glucose.: 115 mg/dL (31 Oct 2018 07:51)  POCT Blood Glucose.: 234 mg/dL (30 Oct 2018 21:21)  POCT Blood Glucose.: 202 mg/dL (30 Oct 2018 17:08)    I&O's Summary    30 Oct 2018 07:01  -  31 Oct 2018 07:00  --------------------------------------------------------  IN: 1240 mL / OUT: 0 mL / NET: 1240 mL        LABS:                        12.4   8.1   )-----------( 176      ( 31 Oct 2018 08:02 )             37.7                       MEDICATIONS:  acetaminophen   Tablet .. 650 milliGRAM(s) Oral every 6 hours PRN  ALBUTerol    0.083% 2.5 milliGRAM(s) Nebulizer every 4 hours PRN  ALBUTerol/ipratropium for Nebulization 3 milliLiter(s) Nebulizer every 6 hours  aspirin enteric coated 81 milliGRAM(s) Oral daily  atorvastatin 40 milliGRAM(s) Oral at bedtime  clopidogrel Tablet 75 milliGRAM(s) Oral daily  dextrose 40% Gel 15 Gram(s) Oral once PRN  dextrose 5%. 1000 milliLiter(s) IV Continuous <Continuous>  dextrose 50% Injectable 12.5 Gram(s) IV Push once  dextrose 50% Injectable 25 Gram(s) IV Push once  dextrose 50% Injectable 25 Gram(s) IV Push once  enoxaparin Injectable 40 milliGRAM(s) SubCutaneous daily  glucagon  Injectable 1 milliGRAM(s) IntraMuscular once PRN  insulin glargine Injectable (LANTUS) 40 Unit(s) SubCutaneous two times a day  insulin lispro (HumaLOG) corrective regimen sliding scale   SubCutaneous Before meals and at bedtime  insulin lispro Injectable (HumaLOG) 10 Unit(s) SubCutaneous three times a day before meals  lidocaine 1%/EPINEPHrine 1:100,000 Inj 20 milliLiter(s) Local Injection once  lisinopril 2.5 milliGRAM(s) Oral daily  mirtazapine 7.5 milliGRAM(s) Oral at bedtime PRN  nicotine - 21 mG/24Hr(s) Patch 1 patch Transdermal daily  nystatin    Suspension [id number] Unit(s) Oral four times a day  oxyCODONE    5 mG/acetaminophen 325 mG 1 Tablet(s) Oral every 6 hours PRN  pantoprazole    Tablet 40 milliGRAM(s) Oral before breakfast  saccharomyces boulardii 250 milliGRAM(s) Oral two times a day  spironolactone 12.5 milliGRAM(s) Oral daily

## 2018-10-31 NOTE — PROGRESS NOTE ADULT - ASSESSMENT
>Neck Cellulitis with large abscess, s/p I&D - Improved, now off IV Abx.    > Acute on chronic hypercapnic and hypoxic respiratory failure - Multifactorial (COPD Exacerbation, Pulmonary HTN, and Diastolic Heart Failure) - RVP is positive for Rhinovirus.  Completed steroids, continue O2 supplements (Home o2 dependent), encourage OOB. Smoking cessation emphasized. O2NC 2L now. Per SW/ CCM Pt renting room and his landlord will not allow him on oxygen tank given active smoking.  Awaiting SNF placement  > DMII on long term insulin therapy - FS now well controlled on Lantus BID.  Continue Premeal insulin and insulin coverage.    > Smoker - Nicotine Patch. Smoking cessation counseled.   > Acute on chronic Diastolic CHF - C/w aldactone, Lisinopril. Metoprolol d/ilir due to pause by cardiology.   > COPD/ PHTN- Plan as above, O2 supplements, Prednisone taper, Duoneb. Pulmonology signed off.   > CAD and HLD - Continue Aspirin, Plavix, Lipitor. Metoprolol d/ilir by cardio as above.   > Anxiety / mood / Sleep disorder- Seen by Psych, started on Xanax/ Remeron.    > DVT Prophylaxis -- Lovenox 40 mg  > Dispo- Stable for discharge to SNF.  Discussed with CM.      The Hospitalist Service will assume care of this patient beginning tomorrow.

## 2018-11-01 VITALS — DIASTOLIC BLOOD PRESSURE: 74 MMHG | HEART RATE: 91 BPM | TEMPERATURE: 98 F | SYSTOLIC BLOOD PRESSURE: 118 MMHG

## 2018-11-01 LAB
GLUCOSE BLDC GLUCOMTR-MCNC: 126 MG/DL — HIGH (ref 70–99)
GLUCOSE BLDC GLUCOMTR-MCNC: 135 MG/DL — HIGH (ref 70–99)
GLUCOSE BLDC GLUCOMTR-MCNC: 175 MG/DL — HIGH (ref 70–99)
HCT VFR BLD CALC: 39.9 % — LOW (ref 42–52)
HGB BLD-MCNC: 12.6 G/DL — LOW (ref 14–18)
MCHC RBC-ENTMCNC: 28.3 PG — SIGNIFICANT CHANGE UP (ref 27–31)
MCHC RBC-ENTMCNC: 31.6 G/DL — LOW (ref 32–36)
MCV RBC AUTO: 89.7 FL — SIGNIFICANT CHANGE UP (ref 80–94)
PLATELET # BLD AUTO: 214 K/UL — SIGNIFICANT CHANGE UP (ref 150–400)
RBC # BLD: 4.45 M/UL — LOW (ref 4.6–6.2)
RBC # FLD: 14.8 % — SIGNIFICANT CHANGE UP (ref 11–15.6)
WBC # BLD: 8.5 K/UL — SIGNIFICANT CHANGE UP (ref 4.8–10.8)
WBC # FLD AUTO: 8.5 K/UL — SIGNIFICANT CHANGE UP (ref 4.8–10.8)

## 2018-11-01 PROCEDURE — 97163 PT EVAL HIGH COMPLEX 45 MIN: CPT

## 2018-11-01 PROCEDURE — 87186 SC STD MICRODIL/AGAR DIL: CPT

## 2018-11-01 PROCEDURE — 96374 THER/PROPH/DIAG INJ IV PUSH: CPT

## 2018-11-01 PROCEDURE — 80048 BASIC METABOLIC PNL TOTAL CA: CPT

## 2018-11-01 PROCEDURE — 87581 M.PNEUMON DNA AMP PROBE: CPT

## 2018-11-01 PROCEDURE — 87070 CULTURE OTHR SPECIMN AEROBIC: CPT

## 2018-11-01 PROCEDURE — 82803 BLOOD GASES ANY COMBINATION: CPT

## 2018-11-01 PROCEDURE — 84100 ASSAY OF PHOSPHORUS: CPT

## 2018-11-01 PROCEDURE — 96375 TX/PRO/DX INJ NEW DRUG ADDON: CPT

## 2018-11-01 PROCEDURE — 94760 N-INVAS EAR/PLS OXIMETRY 1: CPT

## 2018-11-01 PROCEDURE — 87205 SMEAR GRAM STAIN: CPT

## 2018-11-01 PROCEDURE — 83036 HEMOGLOBIN GLYCOSYLATED A1C: CPT

## 2018-11-01 PROCEDURE — 94660 CPAP INITIATION&MGMT: CPT

## 2018-11-01 PROCEDURE — 87798 DETECT AGENT NOS DNA AMP: CPT

## 2018-11-01 PROCEDURE — 80061 LIPID PANEL: CPT

## 2018-11-01 PROCEDURE — 80202 ASSAY OF VANCOMYCIN: CPT

## 2018-11-01 PROCEDURE — 93306 TTE W/DOPPLER COMPLETE: CPT

## 2018-11-01 PROCEDURE — 70491 CT SOFT TISSUE NECK W/DYE: CPT

## 2018-11-01 PROCEDURE — 94640 AIRWAY INHALATION TREATMENT: CPT

## 2018-11-01 PROCEDURE — 84484 ASSAY OF TROPONIN QUANT: CPT

## 2018-11-01 PROCEDURE — 83880 ASSAY OF NATRIURETIC PEPTIDE: CPT

## 2018-11-01 PROCEDURE — 83605 ASSAY OF LACTIC ACID: CPT

## 2018-11-01 PROCEDURE — 80053 COMPREHEN METABOLIC PANEL: CPT

## 2018-11-01 PROCEDURE — 97110 THERAPEUTIC EXERCISES: CPT

## 2018-11-01 PROCEDURE — 82962 GLUCOSE BLOOD TEST: CPT

## 2018-11-01 PROCEDURE — 97530 THERAPEUTIC ACTIVITIES: CPT

## 2018-11-01 PROCEDURE — 83735 ASSAY OF MAGNESIUM: CPT

## 2018-11-01 PROCEDURE — 85610 PROTHROMBIN TIME: CPT

## 2018-11-01 PROCEDURE — 71275 CT ANGIOGRAPHY CHEST: CPT

## 2018-11-01 PROCEDURE — 85027 COMPLETE CBC AUTOMATED: CPT

## 2018-11-01 PROCEDURE — 87486 CHLMYD PNEUM DNA AMP PROBE: CPT

## 2018-11-01 PROCEDURE — 87633 RESP VIRUS 12-25 TARGETS: CPT

## 2018-11-01 PROCEDURE — 81001 URINALYSIS AUTO W/SCOPE: CPT

## 2018-11-01 PROCEDURE — 71046 X-RAY EXAM CHEST 2 VIEWS: CPT

## 2018-11-01 PROCEDURE — 87449 NOS EACH ORGANISM AG IA: CPT

## 2018-11-01 PROCEDURE — 87086 URINE CULTURE/COLONY COUNT: CPT

## 2018-11-01 PROCEDURE — 93005 ELECTROCARDIOGRAM TRACING: CPT

## 2018-11-01 PROCEDURE — 70360 X-RAY EXAM OF NECK: CPT

## 2018-11-01 PROCEDURE — 99239 HOSP IP/OBS DSCHRG MGMT >30: CPT

## 2018-11-01 PROCEDURE — 84145 PROCALCITONIN (PCT): CPT

## 2018-11-01 PROCEDURE — 80307 DRUG TEST PRSMV CHEM ANLYZR: CPT

## 2018-11-01 PROCEDURE — 84443 ASSAY THYROID STIM HORMONE: CPT

## 2018-11-01 PROCEDURE — 36415 COLL VENOUS BLD VENIPUNCTURE: CPT

## 2018-11-01 PROCEDURE — 97116 GAIT TRAINING THERAPY: CPT

## 2018-11-01 PROCEDURE — 85730 THROMBOPLASTIN TIME PARTIAL: CPT

## 2018-11-01 PROCEDURE — 99285 EMERGENCY DEPT VISIT HI MDM: CPT | Mod: 25

## 2018-11-01 RX ADMIN — Medication 1 PATCH: at 11:57

## 2018-11-01 RX ADMIN — Medication 3 MILLILITER(S): at 08:48

## 2018-11-01 RX ADMIN — OXYCODONE AND ACETAMINOPHEN 1 TABLET(S): 5; 325 TABLET ORAL at 17:12

## 2018-11-01 RX ADMIN — SPIRONOLACTONE 12.5 MILLIGRAM(S): 25 TABLET, FILM COATED ORAL at 05:47

## 2018-11-01 RX ADMIN — Medication 250 MILLIGRAM(S): at 17:12

## 2018-11-01 RX ADMIN — Medication 3 MILLILITER(S): at 15:42

## 2018-11-01 RX ADMIN — PANTOPRAZOLE SODIUM 40 MILLIGRAM(S): 20 TABLET, DELAYED RELEASE ORAL at 05:47

## 2018-11-01 RX ADMIN — OXYCODONE AND ACETAMINOPHEN 1 TABLET(S): 5; 325 TABLET ORAL at 09:18

## 2018-11-01 RX ADMIN — OXYCODONE AND ACETAMINOPHEN 1 TABLET(S): 5; 325 TABLET ORAL at 18:08

## 2018-11-01 RX ADMIN — Medication 10 UNIT(S): at 11:56

## 2018-11-01 RX ADMIN — Medication 1 PATCH: at 17:12

## 2018-11-01 RX ADMIN — Medication 1 PATCH: at 16:37

## 2018-11-01 RX ADMIN — OXYCODONE AND ACETAMINOPHEN 1 TABLET(S): 5; 325 TABLET ORAL at 08:48

## 2018-11-01 RX ADMIN — INSULIN GLARGINE 40 UNIT(S): 100 INJECTION, SOLUTION SUBCUTANEOUS at 08:48

## 2018-11-01 RX ADMIN — CLOPIDOGREL BISULFATE 75 MILLIGRAM(S): 75 TABLET, FILM COATED ORAL at 11:56

## 2018-11-01 RX ADMIN — Medication 2: at 08:48

## 2018-11-01 RX ADMIN — Medication 250 MILLIGRAM(S): at 05:46

## 2018-11-01 RX ADMIN — Medication 81 MILLIGRAM(S): at 11:56

## 2018-11-01 RX ADMIN — Medication 3 MILLILITER(S): at 03:41

## 2018-11-01 RX ADMIN — ENOXAPARIN SODIUM 40 MILLIGRAM(S): 100 INJECTION SUBCUTANEOUS at 11:57

## 2018-11-01 RX ADMIN — Medication 10 UNIT(S): at 08:48

## 2018-11-01 NOTE — PROGRESS NOTE ADULT - PROVIDER SPECIALTY LIST ADULT
Cardiology
Hospitalist
Infectious Disease
Pulmonology
Surgery
Pulmonology
Hospitalist
Hospitalist
Infectious Disease

## 2018-11-01 NOTE — PROGRESS NOTE ADULT - ASSESSMENT
>Neck Cellulitis with large abscess, s/p I&D - Improved, now off IV Abx.    > Acute on chronic hypercapnic and hypoxic respiratory failure - Multifactorial (COPD Exacerbation, Pulmonary HTN, and Diastolic Heart Failure) - RVP is positive for Rhinovirus.  Completed steroids, continue O2 supplements (Home o2 dependent), encourage OOB. Smoking cessation emphasized. O2NC 2L now. Per SW/ CCM Pt renting room and his landlord will not allow him on oxygen tank given active smoking.  Awaiting SNF placement  > DMII on long term insulin therapy - FS now well controlled on Lantus BID.  Continue Premeal insulin and insulin coverage.    > Smoker - Nicotine Patch. Smoking cessation counseled.   > Acute on chronic Diastolic CHF - C/w aldactone, Lisinopril. Metoprolol d/ilir due to pause by cardiology.   > COPD/ PHTN- Plan as above, O2 supplements, Prednisone taper, Duoneb. Pulmonology signed off.   > CAD and HLD - Continue Aspirin, Plavix, Lipitor. Metoprolol d/ilir by cardio as above.   > Anxiety / mood / Sleep disorder- Seen by Psych, started on Xanax/ Remeron.    > DVT Prophylaxis -- Lovenox 40 mg  > Dispo- Stable for discharge to SNF.  Discussed with CM - possible discharge today.

## 2018-11-01 NOTE — PROGRESS NOTE ADULT - REASON FOR ADMISSION
shortness of breath

## 2018-11-01 NOTE — PROGRESS NOTE ADULT - SUBJECTIVE AND OBJECTIVE BOX
HOSPITALIST PROGRESS NOTE    DEVIKA CARBALLO  [id number]  58yMale    Patient is a 58y old  Male who presents with a chief complaint of shortness of breath (31 Oct 2018 12:54)      SUBJECTIVE:   Chart reviewed since last visit.  Patient seen and examined at bedside for COPD, neck abscess.  Denies any dyspnea, chest pain, fever or chills.  Gets neck pain and back pain from lying in bed.      OBJECTIVE:  Vital Signs Last 24 Hrs  T(C): 36.7 (01 Nov 2018 16:08), Max: 36.7 (01 Nov 2018 05:46)  T(F): 98.1 (01 Nov 2018 16:08), Max: 98.1 (01 Nov 2018 16:08)  HR: 91 (01 Nov 2018 16:08) (60 - 91)  BP: 118/74 (01 Nov 2018 16:08) (74/50 - 118/74)   RR: 18 (01 Nov 2018 12:12) (18 - 20)  SpO2: 95% (01 Nov 2018 05:46) (93% - 95%)    PHYSICAL EXAMINATION  General: NAD[+]   nontoxic[+]   ill-appearing[]  Obese[]  HEENT: AT/NC[]  PERRLA[+]  EOMI[+]   Moist oral mucosa[]  Pharyngeal exudates[]  NECK: Posterior wound - well healed C/D/I  CVS: RRR[+]  Irregular[]  S1+S2[+]   Murmur[]  RESP: Fair air entry bilaterally[+]   Clear sounds[+]   poor effort []  wheeze[]   Crackles[]  GI: Soft[+]  Nondistended[+]   Nontender[+]   Bowel Sounds[+]     : suprapubic tenderness[-]   CVA Tenderness[]   [place][-]  MS: FROM[+]   Edema[-]  CNS: AAOx3[+]    Grossly intact, [place]  INTEG: Skin is Warm[+]  dry[] Lesion[] Decubitus[]  PSYCH: Fair Mood, Affect    MONITOR:  CAPILLARY BLOOD GLUCOSE      POCT Blood Glucose.: 126 mg/dL (01 Nov 2018 11:53)  POCT Blood Glucose.: 175 mg/dL (01 Nov 2018 08:29)  POCT Blood Glucose.: 189 mg/dL (31 Oct 2018 21:49)  POCT Blood Glucose.: 221 mg/dL (31 Oct 2018 17:12)        I&O's Summary    31 Oct 2018 07:01  -  01 Nov 2018 07:00  --------------------------------------------------------  IN: 720 mL / OUT: 0 mL / NET: 720 mL                            12.6   8.5   )-----------( 214      ( 01 Nov 2018 07:25 )             39.9              MEDICATIONS  (STANDING):  ALBUTerol/ipratropium for Nebulization 3 milliLiter(s) Nebulizer every 6 hours  aspirin enteric coated 81 milliGRAM(s) Oral daily  atorvastatin 40 milliGRAM(s) Oral at bedtime  clopidogrel Tablet 75 milliGRAM(s) Oral daily  dextrose 5%. 1000 milliLiter(s) (50 mL/Hr) IV Continuous <Continuous>  dextrose 50% Injectable 12.5 Gram(s) IV Push once  dextrose 50% Injectable 25 Gram(s) IV Push once  dextrose 50% Injectable 25 Gram(s) IV Push once  enoxaparin Injectable 40 milliGRAM(s) SubCutaneous daily  insulin glargine Injectable (LANTUS) 40 Unit(s) SubCutaneous two times a day  insulin lispro (HumaLOG) corrective regimen sliding scale   SubCutaneous Before meals and at bedtime  insulin lispro Injectable (HumaLOG) 10 Unit(s) SubCutaneous three times a day before meals  lidocaine 1%/EPINEPHrine 1:100,000 Inj 20 milliLiter(s) Local Injection once  lisinopril 2.5 milliGRAM(s) Oral daily  nicotine - 21 mG/24Hr(s) Patch 1 patch Transdermal daily  nystatin    Suspension 306922 Unit(s) Oral four times a day  pantoprazole    Tablet 40 milliGRAM(s) Oral before breakfast  saccharomyces boulardii 250 milliGRAM(s) Oral two times a day  spironolactone 12.5 milliGRAM(s) Oral daily      MEDICATIONS  (PRN):  acetaminophen   Tablet .. 650 milliGRAM(s) Oral every 6 hours PRN Mild Pain (1 - 3), Moderate Pain (4 - 6)  ALBUTerol    0.083% 2.5 milliGRAM(s) Nebulizer every 4 hours PRN Shortness of Breath and/or Wheezing  dextrose 40% Gel 15 Gram(s) Oral once PRN Blood Glucose LESS THAN 70 milliGRAM(s)/deciliter  glucagon  Injectable 1 milliGRAM(s) IntraMuscular once PRN Glucose LESS THAN 70 milligrams/deciliter  mirtazapine 7.5 milliGRAM(s) Oral at bedtime PRN Sleep  oxyCODONE    5 mG/acetaminophen 325 mG 1 Tablet(s) Oral every 6 hours PRN Severe Pain (7 - 10)

## 2018-11-15 PROBLEM — E11.9 TYPE 2 DIABETES MELLITUS WITHOUT COMPLICATIONS: Chronic | Status: ACTIVE | Noted: 2018-10-04

## 2018-11-15 PROBLEM — I27.20 PULMONARY HYPERTENSION, UNSPECIFIED: Chronic | Status: ACTIVE | Noted: 2018-10-04

## 2018-11-15 PROBLEM — E78.5 HYPERLIPIDEMIA, UNSPECIFIED: Chronic | Status: ACTIVE | Noted: 2018-10-03

## 2018-11-20 NOTE — PROGRESS NOTE BEHAVIORAL HEALTH - ORIENTED TO PLACE
normal... Well appearing, well nourished, awake, alert, oriented to person, place, time/situation and in no apparent distress. Yes

## 2018-11-21 ENCOUNTER — APPOINTMENT (OUTPATIENT)
Dept: PULMONOLOGY | Facility: CLINIC | Age: 58
End: 2018-11-21
Payer: MEDICAID

## 2018-11-21 VITALS
HEART RATE: 95 BPM | OXYGEN SATURATION: 87 % | BODY MASS INDEX: 30.36 KG/M2 | DIASTOLIC BLOOD PRESSURE: 60 MMHG | HEIGHT: 69 IN | SYSTOLIC BLOOD PRESSURE: 110 MMHG | WEIGHT: 205 LBS

## 2018-11-21 DIAGNOSIS — E11.9 TYPE 2 DIABETES MELLITUS W/OUT COMPLICATIONS: ICD-10-CM

## 2018-11-21 DIAGNOSIS — Z09 ENCOUNTER FOR FOLLOW-UP EXAMINATION AFTER COMPLETED TREATMENT FOR CONDITIONS OTHER THAN MALIGNANT NEOPLASM: ICD-10-CM

## 2018-11-21 DIAGNOSIS — Z87.891 PERSONAL HISTORY OF NICOTINE DEPENDENCE: ICD-10-CM

## 2018-11-21 DIAGNOSIS — J44.9 CHRONIC OBSTRUCTIVE PULMONARY DISEASE, UNSPECIFIED: ICD-10-CM

## 2018-11-21 DIAGNOSIS — Z87.01 PERSONAL HISTORY OF PNEUMONIA (RECURRENT): ICD-10-CM

## 2018-11-21 DIAGNOSIS — Z87.09 PERSONAL HISTORY OF OTHER DISEASES OF THE RESPIRATORY SYSTEM: ICD-10-CM

## 2018-11-21 PROCEDURE — 99215 OFFICE O/P EST HI 40 MIN: CPT

## 2018-11-21 RX ORDER — ALBUTEROL SULFATE 2.5 MG/3ML
(2.5 MG/3ML) SOLUTION RESPIRATORY (INHALATION)
Refills: 0 | Status: ACTIVE | COMMUNITY

## 2018-11-21 RX ORDER — NICOTINE 21 MG/24HR
21 PATCH, TRANSDERMAL 24 HOURS TRANSDERMAL
Refills: 0 | Status: ACTIVE | COMMUNITY

## 2018-11-21 RX ORDER — FAMOTIDINE 20 MG/1
20 TABLET, FILM COATED ORAL
Refills: 0 | Status: ACTIVE | COMMUNITY

## 2018-11-21 RX ORDER — QUETIAPINE 50 MG/1
50 TABLET, FILM COATED ORAL
Refills: 0 | Status: ACTIVE | COMMUNITY

## 2018-11-21 RX ORDER — INSULIN GLARGINE 100 [IU]/ML
100 INJECTION, SOLUTION SUBCUTANEOUS
Refills: 0 | Status: ACTIVE | COMMUNITY

## 2018-11-21 RX ORDER — ALPRAZOLAM 0.25 MG/1
0.25 TABLET ORAL
Refills: 0 | Status: ACTIVE | COMMUNITY

## 2018-11-21 RX ORDER — CLOPIDOGREL 75 MG/1
75 TABLET, FILM COATED ORAL
Refills: 0 | Status: ACTIVE | COMMUNITY

## 2018-11-21 RX ORDER — SIMVASTATIN 20 MG/1
20 TABLET, FILM COATED ORAL
Refills: 0 | Status: ACTIVE | COMMUNITY

## 2018-11-21 RX ORDER — OXYCODONE HYDROCHLORIDE AND ACETAMINOPHEN 5; 325 MG/1; MG/1
5-325 TABLET ORAL
Refills: 0 | Status: ACTIVE | COMMUNITY

## 2018-11-21 RX ORDER — LISINOPRIL 2.5 MG/1
2.5 TABLET ORAL
Refills: 0 | Status: ACTIVE | COMMUNITY

## 2018-11-21 RX ORDER — SPIRONOLACTONE 25 MG/1
25 TABLET ORAL
Refills: 0 | Status: ACTIVE | COMMUNITY

## 2018-11-21 RX ORDER — INSULIN LISPRO 100 U/ML
100 INJECTION, SOLUTION INTRAVENOUS; SUBCUTANEOUS
Refills: 0 | Status: ACTIVE | COMMUNITY

## 2019-01-11 ENCOUNTER — APPOINTMENT (OUTPATIENT)
Dept: PULMONOLOGY | Facility: CLINIC | Age: 59
End: 2019-01-11

## 2019-05-07 ENCOUNTER — INPATIENT (INPATIENT)
Facility: HOSPITAL | Age: 59
LOS: 5 days | Discharge: HOME | DRG: 192 | End: 2019-05-13
Attending: INTERNAL MEDICINE | Admitting: HOSPITALIST
Payer: MEDICAID

## 2019-05-07 VITALS
DIASTOLIC BLOOD PRESSURE: 65 MMHG | OXYGEN SATURATION: 97 % | TEMPERATURE: 98 F | RESPIRATION RATE: 33 BRPM | HEIGHT: 70 IN | SYSTOLIC BLOOD PRESSURE: 111 MMHG | WEIGHT: 179.9 LBS | HEART RATE: 98 BPM

## 2019-05-07 LAB
ALBUMIN SERPL ELPH-MCNC: 4.2 G/DL — SIGNIFICANT CHANGE UP (ref 3.3–5.2)
ALP SERPL-CCNC: 93 U/L — SIGNIFICANT CHANGE UP (ref 40–120)
ALT FLD-CCNC: 14 U/L — SIGNIFICANT CHANGE UP
ANION GAP SERPL CALC-SCNC: 13 MMOL/L — SIGNIFICANT CHANGE UP (ref 5–17)
ANION GAP SERPL CALC-SCNC: 14 MMOL/L — SIGNIFICANT CHANGE UP (ref 5–17)
APTT BLD: 32 SEC — SIGNIFICANT CHANGE UP (ref 27.5–36.3)
AST SERPL-CCNC: 12 U/L — SIGNIFICANT CHANGE UP
BASE EXCESS BLDV CALC-SCNC: -0.2 MMOL/L — SIGNIFICANT CHANGE UP (ref -2–2)
BASOPHILS # BLD AUTO: 0 K/UL — SIGNIFICANT CHANGE UP (ref 0–0.2)
BASOPHILS NFR BLD AUTO: 0.2 % — SIGNIFICANT CHANGE UP (ref 0–2)
BILIRUB SERPL-MCNC: 0.6 MG/DL — SIGNIFICANT CHANGE UP (ref 0.4–2)
BUN SERPL-MCNC: 14 MG/DL — SIGNIFICANT CHANGE UP (ref 8–20)
BUN SERPL-MCNC: 25 MG/DL — HIGH (ref 8–20)
CA-I SERPL-SCNC: 1.04 MMOL/L — LOW (ref 1.15–1.33)
CALCIUM SERPL-MCNC: 7.9 MG/DL — LOW (ref 8.6–10.2)
CALCIUM SERPL-MCNC: 9.3 MG/DL — SIGNIFICANT CHANGE UP (ref 8.6–10.2)
CHLORIDE BLDV-SCNC: 95 MMOL/L — LOW (ref 98–107)
CHLORIDE SERPL-SCNC: 94 MMOL/L — LOW (ref 98–107)
CHLORIDE SERPL-SCNC: 95 MMOL/L — LOW (ref 98–107)
CO2 SERPL-SCNC: 23 MMOL/L — SIGNIFICANT CHANGE UP (ref 22–29)
CO2 SERPL-SCNC: 27 MMOL/L — SIGNIFICANT CHANGE UP (ref 22–29)
CREAT SERPL-MCNC: 0.69 MG/DL — SIGNIFICANT CHANGE UP (ref 0.5–1.3)
CREAT SERPL-MCNC: 0.73 MG/DL — SIGNIFICANT CHANGE UP (ref 0.5–1.3)
EOSINOPHIL # BLD AUTO: 0.2 K/UL — SIGNIFICANT CHANGE UP (ref 0–0.5)
EOSINOPHIL NFR BLD AUTO: 1.3 % — SIGNIFICANT CHANGE UP (ref 0–5)
GAS PNL BLDV: 131 MMOL/L — LOW (ref 135–145)
GAS PNL BLDV: SIGNIFICANT CHANGE UP
GLUCOSE BLDC GLUCOMTR-MCNC: 447 MG/DL — HIGH (ref 70–99)
GLUCOSE BLDC GLUCOMTR-MCNC: >530 MG/DL — CRITICAL HIGH (ref 70–99)
GLUCOSE BLDC GLUCOMTR-MCNC: >530 MG/DL — CRITICAL HIGH (ref 70–99)
GLUCOSE BLDV-MCNC: 551 MG/DL — CRITICAL HIGH (ref 70–99)
GLUCOSE SERPL-MCNC: 335 MG/DL — HIGH (ref 70–115)
GLUCOSE SERPL-MCNC: 453 MG/DL — HIGH (ref 70–115)
GLUCOSE SERPL-MCNC: 646 MG/DL — CRITICAL HIGH (ref 70–115)
HCO3 BLDV-SCNC: 24 MMOL/L — SIGNIFICANT CHANGE UP (ref 21–29)
HCO3 BLDV-SCNC: 28 MMOL/L — SIGNIFICANT CHANGE UP (ref 21–29)
HCT VFR BLD CALC: 46.2 % — SIGNIFICANT CHANGE UP (ref 42–52)
HCT VFR BLDA CALC: 42 — SIGNIFICANT CHANGE UP (ref 39–50)
HGB BLD CALC-MCNC: 13.6 G/DL — SIGNIFICANT CHANGE UP (ref 13–17)
HGB BLD-MCNC: 15.1 G/DL — SIGNIFICANT CHANGE UP (ref 14–18)
INR BLD: 1.03 RATIO — SIGNIFICANT CHANGE UP (ref 0.88–1.16)
LACTATE BLDV-MCNC: 1.8 MMOL/L — SIGNIFICANT CHANGE UP (ref 0.5–2)
LACTATE BLDV-MCNC: 2.5 MMOL/L — HIGH (ref 0.5–2)
LYMPHOCYTES # BLD AUTO: 2.4 K/UL — SIGNIFICANT CHANGE UP (ref 1–4.8)
LYMPHOCYTES # BLD AUTO: 21.3 % — SIGNIFICANT CHANGE UP (ref 20–55)
MCHC RBC-ENTMCNC: 29.4 PG — SIGNIFICANT CHANGE UP (ref 27–31)
MCHC RBC-ENTMCNC: 32.7 G/DL — SIGNIFICANT CHANGE UP (ref 32–36)
MCV RBC AUTO: 90.1 FL — SIGNIFICANT CHANGE UP (ref 80–94)
MONOCYTES # BLD AUTO: 0.8 K/UL — SIGNIFICANT CHANGE UP (ref 0–0.8)
MONOCYTES NFR BLD AUTO: 7.6 % — SIGNIFICANT CHANGE UP (ref 3–10)
NEUTROPHILS # BLD AUTO: 7.7 K/UL — SIGNIFICANT CHANGE UP (ref 1.8–8)
NEUTROPHILS NFR BLD AUTO: 69.2 % — SIGNIFICANT CHANGE UP (ref 37–73)
NT-PROBNP SERPL-SCNC: 252 PG/ML — SIGNIFICANT CHANGE UP (ref 0–300)
OTHER CELLS CSF MANUAL: 16 ML/DL — LOW (ref 18–22)
PCO2 BLDV: 54 MMHG — HIGH (ref 35–50)
PCO2 BLDV: 67 MMHG — HIGH (ref 35–50)
PH BLDV: 7.31 — LOW (ref 7.32–7.43)
PH BLDV: 7.31 — LOW (ref 7.32–7.43)
PLATELET # BLD AUTO: 229 K/UL — SIGNIFICANT CHANGE UP (ref 150–400)
PO2 BLDV: 33 MMHG — SIGNIFICANT CHANGE UP (ref 25–45)
PO2 BLDV: 51 MMHG — HIGH (ref 25–45)
POTASSIUM BLDV-SCNC: 4.8 MMOL/L — HIGH (ref 3.4–4.5)
POTASSIUM SERPL-MCNC: 4.4 MMOL/L — SIGNIFICANT CHANGE UP (ref 3.5–5.3)
POTASSIUM SERPL-MCNC: 4.9 MMOL/L — SIGNIFICANT CHANGE UP (ref 3.5–5.3)
POTASSIUM SERPL-SCNC: 4.4 MMOL/L — SIGNIFICANT CHANGE UP (ref 3.5–5.3)
POTASSIUM SERPL-SCNC: 4.9 MMOL/L — SIGNIFICANT CHANGE UP (ref 3.5–5.3)
PROT SERPL-MCNC: 7.2 G/DL — SIGNIFICANT CHANGE UP (ref 6.6–8.7)
PROTHROM AB SERPL-ACNC: 11.9 SEC — SIGNIFICANT CHANGE UP (ref 10–12.9)
RBC # BLD: 5.13 M/UL — SIGNIFICANT CHANGE UP (ref 4.6–6.2)
RBC # FLD: 13.5 % — SIGNIFICANT CHANGE UP (ref 11–15.6)
SAO2 % BLDV: 63 % — SIGNIFICANT CHANGE UP
SAO2 % BLDV: 85 % — SIGNIFICANT CHANGE UP
SODIUM SERPL-SCNC: 131 MMOL/L — LOW (ref 135–145)
SODIUM SERPL-SCNC: 135 MMOL/L — SIGNIFICANT CHANGE UP (ref 135–145)
TROPONIN T SERPL-MCNC: <0.01 NG/ML — SIGNIFICANT CHANGE UP (ref 0–0.06)
WBC # BLD: 11.2 K/UL — HIGH (ref 4.8–10.8)
WBC # FLD AUTO: 11.2 K/UL — HIGH (ref 4.8–10.8)

## 2019-05-07 PROCEDURE — 99218: CPT

## 2019-05-07 RX ORDER — SPIRONOLACTONE 25 MG/1
25 TABLET, FILM COATED ORAL DAILY
Refills: 0 | Status: DISCONTINUED | OUTPATIENT
Start: 2019-05-07 | End: 2019-05-09

## 2019-05-07 RX ORDER — AZITHROMYCIN 500 MG/1
250 TABLET, FILM COATED ORAL EVERY 24 HOURS
Refills: 0 | Status: COMPLETED | OUTPATIENT
Start: 2019-05-07 | End: 2019-05-10

## 2019-05-07 RX ORDER — OXYCODONE AND ACETAMINOPHEN 5; 325 MG/1; MG/1
1 TABLET ORAL EVERY 12 HOURS
Refills: 0 | Status: DISCONTINUED | OUTPATIENT
Start: 2019-05-07 | End: 2019-05-11

## 2019-05-07 RX ORDER — PANTOPRAZOLE SODIUM 20 MG/1
40 TABLET, DELAYED RELEASE ORAL
Refills: 0 | Status: DISCONTINUED | OUTPATIENT
Start: 2019-05-07 | End: 2019-05-13

## 2019-05-07 RX ORDER — INSULIN GLARGINE 100 [IU]/ML
24 INJECTION, SOLUTION SUBCUTANEOUS AT BEDTIME
Refills: 0 | Status: DISCONTINUED | OUTPATIENT
Start: 2019-05-07 | End: 2019-05-09

## 2019-05-07 RX ORDER — IPRATROPIUM/ALBUTEROL SULFATE 18-103MCG
3 AEROSOL WITH ADAPTER (GRAM) INHALATION ONCE
Refills: 0 | Status: COMPLETED | OUTPATIENT
Start: 2019-05-07 | End: 2019-05-07

## 2019-05-07 RX ORDER — GLUCAGON INJECTION, SOLUTION 0.5 MG/.1ML
1 INJECTION, SOLUTION SUBCUTANEOUS ONCE
Refills: 0 | Status: DISCONTINUED | OUTPATIENT
Start: 2019-05-07 | End: 2019-05-13

## 2019-05-07 RX ORDER — IPRATROPIUM/ALBUTEROL SULFATE 18-103MCG
3 AEROSOL WITH ADAPTER (GRAM) INHALATION EVERY 6 HOURS
Refills: 0 | Status: DISCONTINUED | OUTPATIENT
Start: 2019-05-07 | End: 2019-05-13

## 2019-05-07 RX ORDER — LISINOPRIL 2.5 MG/1
2.5 TABLET ORAL DAILY
Refills: 0 | Status: DISCONTINUED | OUTPATIENT
Start: 2019-05-07 | End: 2019-05-13

## 2019-05-07 RX ORDER — ALPRAZOLAM 0.25 MG
0.25 TABLET ORAL AT BEDTIME
Refills: 0 | Status: DISCONTINUED | OUTPATIENT
Start: 2019-05-07 | End: 2019-05-09

## 2019-05-07 RX ORDER — SODIUM CHLORIDE 9 MG/ML
1000 INJECTION, SOLUTION INTRAVENOUS
Refills: 0 | Status: DISCONTINUED | OUTPATIENT
Start: 2019-05-07 | End: 2019-05-13

## 2019-05-07 RX ORDER — OXYCODONE AND ACETAMINOPHEN 5; 325 MG/1; MG/1
1 TABLET ORAL ONCE
Refills: 0 | Status: DISCONTINUED | OUTPATIENT
Start: 2019-05-07 | End: 2019-05-07

## 2019-05-07 RX ORDER — INSULIN GLARGINE 100 [IU]/ML
0.3 INJECTION, SOLUTION SUBCUTANEOUS AT BEDTIME
Refills: 0 | Status: DISCONTINUED | OUTPATIENT
Start: 2019-05-07 | End: 2019-05-07

## 2019-05-07 RX ORDER — MAGNESIUM SULFATE 500 MG/ML
2 VIAL (ML) INJECTION ONCE
Refills: 0 | Status: COMPLETED | OUTPATIENT
Start: 2019-05-07 | End: 2019-05-07

## 2019-05-07 RX ORDER — ACETAMINOPHEN 500 MG
650 TABLET ORAL ONCE
Refills: 0 | Status: COMPLETED | OUTPATIENT
Start: 2019-05-07 | End: 2019-05-07

## 2019-05-07 RX ORDER — NICOTINE POLACRILEX 2 MG
1 GUM BUCCAL DAILY
Refills: 0 | Status: DISCONTINUED | OUTPATIENT
Start: 2019-05-07 | End: 2019-05-09

## 2019-05-07 RX ORDER — DEXTROSE 50 % IN WATER 50 %
15 SYRINGE (ML) INTRAVENOUS ONCE
Refills: 0 | Status: DISCONTINUED | OUTPATIENT
Start: 2019-05-07 | End: 2019-05-13

## 2019-05-07 RX ORDER — MIRTAZAPINE 45 MG/1
7.5 TABLET, ORALLY DISINTEGRATING ORAL DAILY
Refills: 0 | Status: DISCONTINUED | OUTPATIENT
Start: 2019-05-07 | End: 2019-05-13

## 2019-05-07 RX ORDER — DEXTROSE 50 % IN WATER 50 %
25 SYRINGE (ML) INTRAVENOUS ONCE
Refills: 0 | Status: DISCONTINUED | OUTPATIENT
Start: 2019-05-07 | End: 2019-05-13

## 2019-05-07 RX ORDER — DEXTROSE 50 % IN WATER 50 %
12.5 SYRINGE (ML) INTRAVENOUS ONCE
Refills: 0 | Status: DISCONTINUED | OUTPATIENT
Start: 2019-05-07 | End: 2019-05-13

## 2019-05-07 RX ORDER — SODIUM CHLORIDE 9 MG/ML
1000 INJECTION INTRAMUSCULAR; INTRAVENOUS; SUBCUTANEOUS ONCE
Refills: 0 | Status: COMPLETED | OUTPATIENT
Start: 2019-05-07 | End: 2019-05-07

## 2019-05-07 RX ORDER — CLOPIDOGREL BISULFATE 75 MG/1
75 TABLET, FILM COATED ORAL DAILY
Refills: 0 | Status: DISCONTINUED | OUTPATIENT
Start: 2019-05-07 | End: 2019-05-13

## 2019-05-07 RX ORDER — INSULIN LISPRO 100/ML
VIAL (ML) SUBCUTANEOUS
Refills: 0 | Status: DISCONTINUED | OUTPATIENT
Start: 2019-05-07 | End: 2019-05-13

## 2019-05-07 RX ORDER — AZITHROMYCIN 500 MG/1
500 TABLET, FILM COATED ORAL ONCE
Refills: 0 | Status: COMPLETED | OUTPATIENT
Start: 2019-05-07 | End: 2019-05-07

## 2019-05-07 RX ORDER — ATORVASTATIN CALCIUM 80 MG/1
40 TABLET, FILM COATED ORAL AT BEDTIME
Refills: 0 | Status: DISCONTINUED | OUTPATIENT
Start: 2019-05-07 | End: 2019-05-13

## 2019-05-07 RX ADMIN — SODIUM CHLORIDE 1000 MILLILITER(S): 9 INJECTION INTRAMUSCULAR; INTRAVENOUS; SUBCUTANEOUS at 21:00

## 2019-05-07 RX ADMIN — Medication 2 GRAM(S): at 18:09

## 2019-05-07 RX ADMIN — SODIUM CHLORIDE 1000 MILLILITER(S): 9 INJECTION INTRAMUSCULAR; INTRAVENOUS; SUBCUTANEOUS at 20:00

## 2019-05-07 RX ADMIN — Medication 3 MILLILITER(S): at 20:42

## 2019-05-07 RX ADMIN — ATORVASTATIN CALCIUM 40 MILLIGRAM(S): 80 TABLET, FILM COATED ORAL at 21:02

## 2019-05-07 RX ADMIN — MIRTAZAPINE 7.5 MILLIGRAM(S): 45 TABLET, ORALLY DISINTEGRATING ORAL at 21:30

## 2019-05-07 RX ADMIN — Medication 1 PATCH: at 21:30

## 2019-05-07 RX ADMIN — AZITHROMYCIN 250 MILLIGRAM(S): 500 TABLET, FILM COATED ORAL at 21:02

## 2019-05-07 RX ADMIN — SPIRONOLACTONE 25 MILLIGRAM(S): 25 TABLET, FILM COATED ORAL at 21:30

## 2019-05-07 RX ADMIN — Medication 0.25 MILLIGRAM(S): at 21:03

## 2019-05-07 RX ADMIN — INSULIN GLARGINE 24 UNIT(S): 100 INJECTION, SOLUTION SUBCUTANEOUS at 21:03

## 2019-05-07 RX ADMIN — AZITHROMYCIN 255 MILLIGRAM(S): 500 TABLET, FILM COATED ORAL at 09:51

## 2019-05-07 RX ADMIN — SODIUM CHLORIDE 1000 MILLILITER(S): 9 INJECTION INTRAMUSCULAR; INTRAVENOUS; SUBCUTANEOUS at 19:39

## 2019-05-07 RX ADMIN — Medication 3 MILLILITER(S): at 09:12

## 2019-05-07 RX ADMIN — AZITHROMYCIN 500 MILLIGRAM(S): 500 TABLET, FILM COATED ORAL at 18:09

## 2019-05-07 RX ADMIN — Medication 50 GRAM(S): at 09:07

## 2019-05-07 RX ADMIN — SODIUM CHLORIDE 1000 MILLILITER(S): 9 INJECTION INTRAMUSCULAR; INTRAVENOUS; SUBCUTANEOUS at 18:36

## 2019-05-07 RX ADMIN — Medication 12: at 19:08

## 2019-05-07 RX ADMIN — Medication 125 MILLIGRAM(S): at 09:11

## 2019-05-07 RX ADMIN — OXYCODONE AND ACETAMINOPHEN 1 TABLET(S): 5; 325 TABLET ORAL at 13:54

## 2019-05-07 RX ADMIN — Medication 650 MILLIGRAM(S): at 21:02

## 2019-05-07 RX ADMIN — OXYCODONE AND ACETAMINOPHEN 1 TABLET(S): 5; 325 TABLET ORAL at 18:09

## 2019-05-07 RX ADMIN — Medication 3 MILLILITER(S): at 09:13

## 2019-05-07 RX ADMIN — CLOPIDOGREL BISULFATE 75 MILLIGRAM(S): 75 TABLET, FILM COATED ORAL at 21:03

## 2019-05-07 RX ADMIN — Medication 650 MILLIGRAM(S): at 21:11

## 2019-05-07 NOTE — ED PROVIDER NOTE - OBJECTIVE STATEMENT
Pt is a 58yoM with h/o HTN, HLD, CAD, BPDO, COPD on home O2 2.5 L, on prednisone for a couple of months;   pw SOB. Pt states he has been without his oxygen compressor at home for 1 week and ran out of his albuterol yesterday. Also states he ran out of his prednisone yesterday. Pt denies fever/chills/ new cough/ change in sputum, leg pain/ swelling, chest pain.  Pt denies any h/o intubation or BIPAP use. Last hospitalization was 1 mo at [facility] for COPD exacerbation.     PMD: Dr. Ken.

## 2019-05-07 NOTE — ED CDU PROVIDER INITIAL DAY NOTE - ATTENDING CONTRIBUTION TO CARE
I, William Bateman, have personally performed a face to face diagnostic evaluation on this patient. I have reviewed the ACP note and agree with the history, exam and plan of care, except as noted.    59 yo M hx of HTN, DM,  CAD, COPD on home 2.5 L O2 p/w sob. He didn't have his oxygen at home and ran out of his meds. On exam, diffuse wheezing, no abdominal breathing, RRR, abdomen soft, no extremity edema. Trop negative. CXR clear. GLucose as high but no gap. patient on insulin sliding scale. patient received duoneb and solumedral. Patient place in Obs for SW to see in the Am to set up for O2 and treatment of COPD.

## 2019-05-07 NOTE — ED CDU PROVIDER INITIAL DAY NOTE - MEDICAL DECISION MAKING DETAILS
Pt with hypoxia without O2 at home. SW helping to get O2 at home. Pt found to have hyperglycemia in obs. Will check labs and give insulin/ a1c/dm education

## 2019-05-07 NOTE — ED PROVIDER NOTE - CLINICAL SUMMARY MEDICAL DECISION MAKING FREE TEXT BOX
Pt presents with COPD exacerbation in the setting of running out of home O2/ steroids and bronchodilators;  speaking full sentences but slightly tachypneic with diffuse wheezes and poor air movement; Plan to treat with steroids, nebulizers, magnesium, azithromycin ,CXR, and reeevaluate. EKG with RBBB, no STEMi.

## 2019-05-07 NOTE — ED ADULT TRIAGE NOTE - CHIEF COMPLAINT QUOTE
Pt c/o SOB that began last night. Pt has Hx COPD, states to have ran out of medication. Dr. Williamson to bedside to evaluate

## 2019-05-07 NOTE — ED ADULT NURSE NOTE - OBJECTIVE STATEMENT
Pt c/o SOB that began last night. Denies fever .  Pt has Hx COPD, states to have ran out of medication. Pt states he ran out of prednisone two days ago  Dr. Williamson to bedside to evaluate. Denies chest pain

## 2019-05-07 NOTE — CONSULT NOTE ADULT - SUBJECTIVE AND OBJECTIVE BOX
58 year old male with PMH HTN, Dyslipidemia, CAD, DMT2, COPD, Pulmonary HTN, CHFpEF, Chronic respiratory failure requiring home O2 presenting to [facility] ER after his Oxygen stopped working 2 days ago.  He waited at home (which is a boarding home) for a day before he realized he couldn't manage, so came to the ER.    Associated dyspnea, cough with scant white phelgm. Denies any chest pain, fever, chills, palpitations, dizziness, leg swelling or pain, recent travel or sick contacts.      PMH -  As above  PSH - PCI  NKDA  Med - list reviewed  FH - No CAD  SocHx - Lives in boarding house   ROS - negative except as mentioned in HPI    On Examination     Vital Signs Last 24 Hrs  T(C): 36.6 (07 May 2019 08:23), Max: 36.6 (07 May 2019 08:23)  T(F): 97.9 (07 May 2019 08:23), Max: 97.9 (07 May 2019 08:23)  HR: 78 (07 May 2019 16:20) (78 - 98)  BP: 90/51 (07 May 2019 16:20) (90/51 - 111/65)   RR: 33 (07 May 2019 08:23) (33 - 33)  SpO2: 95% (07 May 2019 16:20) (88% - 99%)    Patient Oxygen was turned off by RN and still with NC - SPO2 dropped down and hovered around 87,88    GEN - Lying on stretcher, NAD  HEENT - extraocular movements intact, pupils equal, responsive, reactive to light and accomodation  Neck - Supple  CVS regular rate and rhythm S1 S2  Resp: Decreased breath sounds, poor effort - no wheezing or crackles  GI Soft nondistended nontender BS+   - No suprapubic tenderness  MS - No C/C/E, FROM  CNS - NO gross focal or global deficit noted    CBC Full  -  ( 07 May 2019 09:02 )  WBC Count : 11.2 K/uL  RBC Count : 5.13 M/uL  Hemoglobin : 15.1 g/dL  Hematocrit : 46.2 %  Platelet Count - Automated : 229 K/uL  Mean Cell Volume : 90.1 fl  Mean Cell Hemoglobin : 29.4 pg  Mean Cell Hemoglobin Concentration : 32.7 g/dL  Auto Neutrophil # : 7.7 K/uL  Auto Lymphocyte # : 2.4 K/uL  Auto Monocyte # : 0.8 K/uL  Auto Eosinophil # : 0.2 K/uL  Auto Basophil # : 0.0 K/uL  Auto Neutrophil % : 69.2 %  Auto Lymphocyte % : 21.3 %  Auto Monocyte % : 7.6 %  Auto Eosinophil % : 1.3 %  Auto Basophil % : 0.2 %      PT/INR - ( 07 May 2019 09:02 )   PT: 11.9 sec;   INR: 1.03 ratio         PTT - ( 07 May 2019 09:02 )  PTT:32.0 sec    05-07    135  |  94<L>  |  14.0  ----------------------------<  335<H>  4.9   |  27.0  |  0.69    Ca    9.3      07 May 2019 09:02    TPro  7.2  /  Alb  4.2  /  TBili  0.6  /  DBili  x   /  AST  12  /  ALT  14  /  AlkPhos  93  05-07      CARDIAC MARKERS ( 07 May 2019 09:02 )  CKx     / CKMBx     / Troponin T<0.01 ng/mL /        Serum Pro-Brain Natriuretic Peptide: 252 pg/mL (05-07-19 @ 09:02)        < from: Xray Chest 1 View- PORTABLE-Urgent (05.07.19 @ 09:33) >     EXAM:  XR CHEST PORTABLE URGENT 1V                          PROCEDURE DATE:  05/07/2019          INTERPRETATION:  CLINICAL INFORMATION: SOB. . .    EXAM: AP chest.    COMPARISON: Prior radiographs dated 10/3/2018. CT chest 10/5/2018.    FINDINGS:  No focal consolidation.  No pleural effusions or pneumothorax.  The cardiomediastinal silhouette is within normal limits. Prominent   pulmonary artery which can be seen in the setting of pulmonary arterial   hypertension.      IMPRESSION:   No focal consolidation.   Prominent pulmonary artery which can be seen in the setting of pulmonary   arterial hypertension.                FANI WILLIAMSON   This document has been electronically signed. May  7 2019  9:55AM                  < end of copied text >

## 2019-05-07 NOTE — ED ADULT NURSE REASSESSMENT NOTE - NS ED NURSE REASSESS COMMENT FT1
orders received to repeat bmp and lactate. blood drawn and sent to lab. Pt remains NPO, lantus given.

## 2019-05-07 NOTE — CONSULT NOTE ADULT - ASSESSMENT
58 year old male with known COPD and associated respiratory failure, dependent on home O2 2.5L around the clock presents to [facility] ER after his O2 finished.  Patient hypoxic without oxygen (87%) and requires Oxygen. He doesn't seem to have any wheezing and BNP is <300      Requires Oxygen to be reinstituted at home - patient meets criteria.  May continue home nebs and other medications  May also benefit from BDZ for Anxiety    If concerned about VTE (low risk) may obtain D-Dimer, LE Doppler and CTPA.      Discussed with ED Dr Williamson and [facility] - will keep in CDU till Oxygen arranged.    Hospitalist service will no longer follow - please recall as needed

## 2019-05-07 NOTE — CHART NOTE - NSCHARTNOTEFT_GEN_A_CORE
SOCIAL WORK NOTE:  THIS WORKER WAS REFERRED TO SEE THE PATIENT BY DR. DURAN AS PATIENT C/O NEEDING O2 AND NOT BEING ABLE TO UTILIZE THE O2 AT THE GROUP HOME.  THIS WORKER AND CCC MET WITH PATIENT AND REVIEWED THE CHART.  PATIENT IS AXOX4, PLEASANT AND COOPERATIVE.  AS PER [facility], PATIENT WAS THERE FROM NOVEMEMBER 2018 THROUGH MARCH 20TH, 2019. HE WAS SENT TO [facility] PSYCH UNIT FOR PSYCHOTIC BREAK AND DID NOT WANT TO RETURN TO SNF AND THEREFORE HIS SISTER FOUND HIM A ROOM AT [facility] FOR $700.00 MONTHLY.  PATIENT HAS BEEN THERE X 2 WEEKS.  PATIENT'S PULSE OX WAS TAKEN ON AMBULATION AND AT REST AND DID NOT PRESENTLY MEET CRITERIA FOR 02. PATIENT WAS REQUESTING A BETTER PLACE TO LIVE. MADE PATIENT AWARE AT THIS MOMENT HE DID NOT MEET CRITERIA FOR SNF PLACEMENT DUE TO HIS AGE AND NOT HAVING ANY SKILLED NEEDS.  PATIENT ALSO GRE TIRED OF LOSS OF FREEDOM AND BECOME UPSET AND ANGRY WHEN HE WANTED OUT OF [place].  [facility] AND THIS WORKER SPOKE WITH HIS SISTER- NIXON MITCHELL # [phone]. SHE CONFIRMED PATIENT COULD NOT STAY WITH HER AND THAT HE SHOULD GO BACK TO HIS HOUSING, HOWEVER SHE DID TAKE OUR SUGGESTION REGARDING ADULT HOME Placement AND WILL ASSIST THE PATIENT TO GET INTO ONE.  THIS WORKER EMAILED ADULT HOME LIST TO NIXON UPON HER REQUEST AND ALSO PROVIDED THE LIST TO THE PATIENT AS HE WAS IN AGREEMENT WITH THE PLAN.

## 2019-05-07 NOTE — ED ADULT NURSE REASSESSMENT NOTE - NS ED NURSE REASSESS COMMENT FT1
assumed care of pt @ 1930, report received from faye JENNINGS, charting as noted. Pt AOx4 in NAD. Pt on observation for COPD exacerbation, noted to have increase in blood sugar. Pt denies any nausea/vomiting, polyuria, or polydipsia. HR is NSR on cardiac monitor, lung sounds wheezing bilaterally, abd is soft and nontender with positive bowel sounds in all four quadrants, skin is warm, dry and appropriate for age and race. pt educated on plan of care and observation stay. Plan of care taught back to RN. Proficiency determined from successful pt teach back. Pt oriented to unit, staff, and room. Pt reeducated on call bell use. Bed locked in lowest position, call bell within reach. All questions and concerns addressed.     next neb @ 2100. Awaiting VBG results.

## 2019-05-07 NOTE — ED PROVIDER NOTE - PROGRESS NOTE DETAILS
Respiratory status improved; pt sitting comfortably on edge of bed; asked for percocet for his chronic back pian which he says he usually takes. Lung sounds improved; wheezing resolved.  Pt reports that his landlord will not allow him to have oxygen tank at home. SW consulted to evaluate living and oxygen situation. PT seen by AMANDA and DR. Donald, hospitalist. who felt pt needed supplemental O2. WIll go to CDU for placement

## 2019-05-07 NOTE — ED ADULT NURSE NOTE - NSIMPLEMENTINTERV_GEN_ALL_ED
Implemented All Universal Safety Interventions:  [place] to call system. Call bell, personal items and telephone within reach. Instruct patient to call for assistance. Room bathroom lighting operational. Non-slip footwear when patient is off stretcher. Physically safe environment: no spills, clutter or unnecessary equipment. Stretcher in lowest position, wheels locked, appropriate side rails in place.

## 2019-05-08 LAB
GLUCOSE BLDC GLUCOMTR-MCNC: 320 MG/DL — HIGH (ref 70–99)
GLUCOSE BLDC GLUCOMTR-MCNC: 337 MG/DL — HIGH (ref 70–99)
GLUCOSE BLDC GLUCOMTR-MCNC: 364 MG/DL — HIGH (ref 70–99)
GLUCOSE BLDC GLUCOMTR-MCNC: 388 MG/DL — HIGH (ref 70–99)
GLUCOSE BLDC GLUCOMTR-MCNC: 415 MG/DL — HIGH (ref 70–99)
GLUCOSE BLDC GLUCOMTR-MCNC: 451 MG/DL — CRITICAL HIGH (ref 70–99)
HBA1C BLD-MCNC: 9.5 % — HIGH (ref 4–5.6)

## 2019-05-08 PROCEDURE — 99224: CPT

## 2019-05-08 RX ORDER — ACETAMINOPHEN 500 MG
650 TABLET ORAL ONCE
Refills: 0 | Status: COMPLETED | OUTPATIENT
Start: 2019-05-08 | End: 2019-05-08

## 2019-05-08 RX ADMIN — AZITHROMYCIN 250 MILLIGRAM(S): 500 TABLET, FILM COATED ORAL at 22:47

## 2019-05-08 RX ADMIN — Medication 650 MILLIGRAM(S): at 17:25

## 2019-05-08 RX ADMIN — OXYCODONE AND ACETAMINOPHEN 1 TABLET(S): 5; 325 TABLET ORAL at 23:06

## 2019-05-08 RX ADMIN — CLOPIDOGREL BISULFATE 75 MILLIGRAM(S): 75 TABLET, FILM COATED ORAL at 12:55

## 2019-05-08 RX ADMIN — SPIRONOLACTONE 25 MILLIGRAM(S): 25 TABLET, FILM COATED ORAL at 05:18

## 2019-05-08 RX ADMIN — Medication 1 PATCH: at 22:39

## 2019-05-08 RX ADMIN — Medication 0.25 MILLIGRAM(S): at 22:48

## 2019-05-08 RX ADMIN — Medication 1 PATCH: at 12:55

## 2019-05-08 RX ADMIN — Medication 40 MILLIGRAM(S): at 05:18

## 2019-05-08 RX ADMIN — PANTOPRAZOLE SODIUM 40 MILLIGRAM(S): 20 TABLET, DELAYED RELEASE ORAL at 05:19

## 2019-05-08 RX ADMIN — LISINOPRIL 2.5 MILLIGRAM(S): 2.5 TABLET ORAL at 05:18

## 2019-05-08 RX ADMIN — Medication 3 MILLILITER(S): at 15:32

## 2019-05-08 RX ADMIN — OXYCODONE AND ACETAMINOPHEN 1 TABLET(S): 5; 325 TABLET ORAL at 23:00

## 2019-05-08 RX ADMIN — ATORVASTATIN CALCIUM 40 MILLIGRAM(S): 80 TABLET, FILM COATED ORAL at 22:47

## 2019-05-08 RX ADMIN — Medication 650 MILLIGRAM(S): at 16:23

## 2019-05-08 RX ADMIN — Medication 40 MILLIGRAM(S): at 23:06

## 2019-05-08 RX ADMIN — Medication 10: at 17:26

## 2019-05-08 RX ADMIN — OXYCODONE AND ACETAMINOPHEN 1 TABLET(S): 5; 325 TABLET ORAL at 09:37

## 2019-05-08 RX ADMIN — Medication 3 MILLILITER(S): at 02:48

## 2019-05-08 RX ADMIN — MIRTAZAPINE 7.5 MILLIGRAM(S): 45 TABLET, ORALLY DISINTEGRATING ORAL at 22:48

## 2019-05-08 RX ADMIN — OXYCODONE AND ACETAMINOPHEN 1 TABLET(S): 5; 325 TABLET ORAL at 08:46

## 2019-05-08 RX ADMIN — Medication 3 MILLILITER(S): at 20:05

## 2019-05-08 RX ADMIN — Medication 1 PATCH: at 05:16

## 2019-05-08 RX ADMIN — INSULIN GLARGINE 24 UNIT(S): 100 INJECTION, SOLUTION SUBCUTANEOUS at 23:06

## 2019-05-08 RX ADMIN — Medication 12: at 12:47

## 2019-05-08 RX ADMIN — Medication 1 PATCH: at 12:54

## 2019-05-08 RX ADMIN — Medication 3 MILLILITER(S): at 09:05

## 2019-05-08 RX ADMIN — Medication 8: at 08:43

## 2019-05-08 RX ADMIN — Medication 40 MILLIGRAM(S): at 14:10

## 2019-05-08 NOTE — ED ADULT NURSE REASSESSMENT NOTE - NSIMPLEMENTINTERV_GEN_ALL_ED
Implemented All Universal Safety Interventions:  [place] to call system. Call bell, personal items and telephone within reach. Instruct patient to call for assistance. Room bathroom lighting operational. Non-slip footwear when patient is off stretcher. Physically safe environment: no spills, clutter or unnecessary equipment. Stretcher in lowest position, wheels locked, appropriate side rails in place.

## 2019-05-08 NOTE — CHART NOTE - NSCHARTNOTEFT_GEN_A_CORE
Social Work Note: This writer spoke with patients house owner Emigdio [phone] who states patient cannot come back to his home on 02 as there was an incident with a previous resident needing 02. There is one house in [place] that is able to accept patients with 02 however that house does not have an opening at this time. This writer discussed patients other option of long term placement to which patient is in agreement at this time. This writer sent clinicals to [facility] nursing homes to find a long term placement for patient. Patient had multiple accepting facilities and this writer discussed facilities with patient. Patient is in agreement with [facility]. [facility] has opened up case with insurance company for authorization and this writer sent clinicals to insurance [facility] Medicaid for review. Patients sister is also in agreement with long term placement as well. SW will follow for authorization.

## 2019-05-08 NOTE — ED ADULT NURSE REASSESSMENT NOTE - NS ED NURSE REASSESS COMMENT FT1
Pt alert and oriented, no apparent distress noted at this time. Pt handed off to faye JENNINGS in stable condition.

## 2019-05-08 NOTE — CHART NOTE - NSCHARTNOTEFT_GEN_A_CORE
Social Work Note: This writer spoke with patients house owner Emigdio [phone] who states he cannot accept patient back with Oxygen as there was an incident in the past with another resident needing O2. Emigdio states he will see if patient can be moved to another house that can take patient with O2. SW will follow.

## 2019-05-08 NOTE — ED ADULT NURSE REASSESSMENT NOTE - NS ED NURSE REASSESS COMMENT FT1
assumed care of pt @ 1930, report received from faye JENNINGS, charting as noted. Pt AOx4 in NAD, Vital Signs Stable. Pt awaiting authorization from insurance for long term placement @ [facility]. HR is NSR on cardiac monitor, mild wheezing noted bilaterally with frequent wet cough. Pt O2 dependent on 3L NC. Abd is soft and nontender with positive bowel sounds in all four quadrants, skin is warm, dry and appropriate for age and race. pt educated on plan of care and observation stay. Plan of care taught back to RN. Proficiency determined from successful pt teach back. Pt oriented to unit, staff, and room. Pt reeducated on call bell use. Bed locked in lowest position, call bell within reach. All questions and concerns addressed.

## 2019-05-09 DIAGNOSIS — I95.9 HYPOTENSION, UNSPECIFIED: ICD-10-CM

## 2019-05-09 DIAGNOSIS — E11.65 TYPE 2 DIABETES MELLITUS WITH HYPERGLYCEMIA: ICD-10-CM

## 2019-05-09 DIAGNOSIS — F17.200 NICOTINE DEPENDENCE, UNSPECIFIED, UNCOMPLICATED: ICD-10-CM

## 2019-05-09 DIAGNOSIS — I25.10 ATHEROSCLEROTIC HEART DISEASE OF NATIVE CORONARY ARTERY WITHOUT ANGINA PECTORIS: ICD-10-CM

## 2019-05-09 DIAGNOSIS — M54.5 LOW BACK PAIN: ICD-10-CM

## 2019-05-09 DIAGNOSIS — J44.1 CHRONIC OBSTRUCTIVE PULMONARY DISEASE WITH (ACUTE) EXACERBATION: ICD-10-CM

## 2019-05-09 LAB
ANION GAP SERPL CALC-SCNC: 12 MMOL/L — SIGNIFICANT CHANGE UP (ref 5–17)
BUN SERPL-MCNC: 24 MG/DL — HIGH (ref 8–20)
CALCIUM SERPL-MCNC: 8.5 MG/DL — LOW (ref 8.6–10.2)
CHLORIDE SERPL-SCNC: 97 MMOL/L — LOW (ref 98–107)
CO2 SERPL-SCNC: 25 MMOL/L — SIGNIFICANT CHANGE UP (ref 22–29)
CREAT SERPL-MCNC: 0.61 MG/DL — SIGNIFICANT CHANGE UP (ref 0.5–1.3)
GLUCOSE BLDC GLUCOMTR-MCNC: 387 MG/DL — HIGH (ref 70–99)
GLUCOSE BLDC GLUCOMTR-MCNC: 390 MG/DL — HIGH (ref 70–99)
GLUCOSE BLDC GLUCOMTR-MCNC: 414 MG/DL — HIGH (ref 70–99)
GLUCOSE BLDC GLUCOMTR-MCNC: 475 MG/DL — CRITICAL HIGH (ref 70–99)
GLUCOSE BLDC GLUCOMTR-MCNC: 496 MG/DL — CRITICAL HIGH (ref 70–99)
GLUCOSE BLDC GLUCOMTR-MCNC: 507 MG/DL — CRITICAL HIGH (ref 70–99)
GLUCOSE BLDC GLUCOMTR-MCNC: >530 MG/DL — CRITICAL HIGH (ref 70–99)
GLUCOSE SERPL-MCNC: 382 MG/DL — HIGH (ref 70–115)
POTASSIUM SERPL-MCNC: 5 MMOL/L — SIGNIFICANT CHANGE UP (ref 3.5–5.3)
POTASSIUM SERPL-SCNC: 5 MMOL/L — SIGNIFICANT CHANGE UP (ref 3.5–5.3)
SODIUM SERPL-SCNC: 134 MMOL/L — LOW (ref 135–145)

## 2019-05-09 PROCEDURE — 99223 1ST HOSP IP/OBS HIGH 75: CPT

## 2019-05-09 PROCEDURE — 99217: CPT

## 2019-05-09 RX ORDER — INSULIN LISPRO 100/ML
4 VIAL (ML) SUBCUTANEOUS
Refills: 0 | Status: DISCONTINUED | OUTPATIENT
Start: 2019-05-09 | End: 2019-05-10

## 2019-05-09 RX ORDER — ENOXAPARIN SODIUM 100 MG/ML
40 INJECTION SUBCUTANEOUS DAILY
Refills: 0 | Status: DISCONTINUED | OUTPATIENT
Start: 2019-05-09 | End: 2019-05-13

## 2019-05-09 RX ORDER — OXYCODONE AND ACETAMINOPHEN 5; 325 MG/1; MG/1
1 TABLET ORAL ONCE
Refills: 0 | Status: DISCONTINUED | OUTPATIENT
Start: 2019-05-09 | End: 2019-05-09

## 2019-05-09 RX ORDER — INSULIN LISPRO 100/ML
2 VIAL (ML) SUBCUTANEOUS
Refills: 0 | Status: DISCONTINUED | OUTPATIENT
Start: 2019-05-09 | End: 2019-05-10

## 2019-05-09 RX ORDER — SODIUM CHLORIDE 9 MG/ML
1000 INJECTION INTRAMUSCULAR; INTRAVENOUS; SUBCUTANEOUS ONCE
Refills: 0 | Status: COMPLETED | OUTPATIENT
Start: 2019-05-09 | End: 2019-05-09

## 2019-05-09 RX ORDER — INSULIN GLARGINE 100 [IU]/ML
30 INJECTION, SOLUTION SUBCUTANEOUS AT BEDTIME
Refills: 0 | Status: DISCONTINUED | OUTPATIENT
Start: 2019-05-09 | End: 2019-05-10

## 2019-05-09 RX ORDER — ALPRAZOLAM 0.25 MG
1 TABLET ORAL
Refills: 0 | Status: DISCONTINUED | OUTPATIENT
Start: 2019-05-09 | End: 2019-05-13

## 2019-05-09 RX ORDER — SODIUM CHLORIDE 9 MG/ML
1000 INJECTION INTRAMUSCULAR; INTRAVENOUS; SUBCUTANEOUS
Refills: 0 | Status: DISCONTINUED | OUTPATIENT
Start: 2019-05-09 | End: 2019-05-13

## 2019-05-09 RX ORDER — NICOTINE POLACRILEX 2 MG
1 GUM BUCCAL DAILY
Refills: 0 | Status: DISCONTINUED | OUTPATIENT
Start: 2019-05-09 | End: 2019-05-13

## 2019-05-09 RX ORDER — ASPIRIN/CALCIUM CARB/MAGNESIUM 324 MG
81 TABLET ORAL DAILY
Refills: 0 | Status: DISCONTINUED | OUTPATIENT
Start: 2019-05-09 | End: 2019-05-13

## 2019-05-09 RX ADMIN — Medication 3 MILLILITER(S): at 20:26

## 2019-05-09 RX ADMIN — Medication 10: at 22:01

## 2019-05-09 RX ADMIN — SODIUM CHLORIDE 80 MILLILITER(S): 9 INJECTION INTRAMUSCULAR; INTRAVENOUS; SUBCUTANEOUS at 21:21

## 2019-05-09 RX ADMIN — Medication 1 PATCH: at 05:50

## 2019-05-09 RX ADMIN — Medication 3 MILLILITER(S): at 09:27

## 2019-05-09 RX ADMIN — ATORVASTATIN CALCIUM 40 MILLIGRAM(S): 80 TABLET, FILM COATED ORAL at 21:58

## 2019-05-09 RX ADMIN — OXYCODONE AND ACETAMINOPHEN 1 TABLET(S): 5; 325 TABLET ORAL at 13:25

## 2019-05-09 RX ADMIN — Medication 1 MILLIGRAM(S): at 11:46

## 2019-05-09 RX ADMIN — PANTOPRAZOLE SODIUM 40 MILLIGRAM(S): 20 TABLET, DELAYED RELEASE ORAL at 08:26

## 2019-05-09 RX ADMIN — Medication 1 PATCH: at 12:08

## 2019-05-09 RX ADMIN — Medication 3 MILLILITER(S): at 15:33

## 2019-05-09 RX ADMIN — OXYCODONE AND ACETAMINOPHEN 1 TABLET(S): 5; 325 TABLET ORAL at 21:20

## 2019-05-09 RX ADMIN — SODIUM CHLORIDE 2000 MILLILITER(S): 9 INJECTION INTRAMUSCULAR; INTRAVENOUS; SUBCUTANEOUS at 08:51

## 2019-05-09 RX ADMIN — OXYCODONE AND ACETAMINOPHEN 1 TABLET(S): 5; 325 TABLET ORAL at 11:42

## 2019-05-09 RX ADMIN — Medication 40 MILLIGRAM(S): at 05:57

## 2019-05-09 RX ADMIN — Medication 40 MILLIGRAM(S): at 11:07

## 2019-05-09 RX ADMIN — SPIRONOLACTONE 25 MILLIGRAM(S): 25 TABLET, FILM COATED ORAL at 05:57

## 2019-05-09 RX ADMIN — Medication 2 UNIT(S): at 17:45

## 2019-05-09 RX ADMIN — SODIUM CHLORIDE 80 MILLILITER(S): 9 INJECTION INTRAMUSCULAR; INTRAVENOUS; SUBCUTANEOUS at 12:09

## 2019-05-09 RX ADMIN — Medication 40 MILLIGRAM(S): at 21:59

## 2019-05-09 RX ADMIN — Medication 1 PATCH: at 20:21

## 2019-05-09 RX ADMIN — Medication 12: at 17:44

## 2019-05-09 RX ADMIN — ENOXAPARIN SODIUM 40 MILLIGRAM(S): 100 INJECTION SUBCUTANEOUS at 11:07

## 2019-05-09 RX ADMIN — Medication 4 UNIT(S): at 11:41

## 2019-05-09 RX ADMIN — Medication 81 MILLIGRAM(S): at 12:08

## 2019-05-09 RX ADMIN — Medication 12: at 08:26

## 2019-05-09 RX ADMIN — AZITHROMYCIN 250 MILLIGRAM(S): 500 TABLET, FILM COATED ORAL at 21:58

## 2019-05-09 RX ADMIN — OXYCODONE AND ACETAMINOPHEN 1 TABLET(S): 5; 325 TABLET ORAL at 20:26

## 2019-05-09 RX ADMIN — MIRTAZAPINE 7.5 MILLIGRAM(S): 45 TABLET, ORALLY DISINTEGRATING ORAL at 21:58

## 2019-05-09 RX ADMIN — Medication 12: at 18:45

## 2019-05-09 RX ADMIN — Medication 1 PATCH: at 11:08

## 2019-05-09 RX ADMIN — INSULIN GLARGINE 30 UNIT(S): 100 INJECTION, SOLUTION SUBCUTANEOUS at 21:59

## 2019-05-09 RX ADMIN — Medication 10: at 11:07

## 2019-05-09 RX ADMIN — Medication 1 MILLIGRAM(S): at 21:58

## 2019-05-09 RX ADMIN — Medication 3 MILLILITER(S): at 03:23

## 2019-05-09 RX ADMIN — OXYCODONE AND ACETAMINOPHEN 1 TABLET(S): 5; 325 TABLET ORAL at 21:58

## 2019-05-09 RX ADMIN — CLOPIDOGREL BISULFATE 75 MILLIGRAM(S): 75 TABLET, FILM COATED ORAL at 11:07

## 2019-05-09 NOTE — ED CDU PROVIDER DISPOSITION NOTE - CLINICAL COURSE
59 y/o M with h/o HTN, HLD, CAD, BPDO, COPD on home O2 2.5 L, on prednisone for a couple of months, presents with SOB. Pt still awaiting placement for home care, now requiring increased doses of insulin and hypotensive, pt has been in observation for multiple days, will require admission at this time.

## 2019-05-09 NOTE — ED CDU PROVIDER SUBSEQUENT DAY NOTE - MEDICAL DECISION MAKING DETAILS
59 yo M with COPD exacerbation, O2 depended. Patient is getting frequent nebs and steroid for treatment.
Pt awaiting Placement. Pending authorization for [facility]. SW aware.

## 2019-05-09 NOTE — CHART NOTE - NSCHARTNOTEFT_GEN_A_CORE
SOCIAL WORK NOTE:  AUTHORIZATION OBTAINED FOR THE PATIENT TO GO TO [facility] IN [facility] WITH AUTH # [id number] WHICH IS GOOD THROUGH 11/4/2019 FOR [facility] CARE.  MD HAS BEEN MADE AWARE REGARDING THE ABOVE ISSUE BUT IS UNCERTAIN IF THE PATIENT IS READY FOR DISCHARGE TODAY DUE TO INCREASED BLOOD SUGAR.

## 2019-05-09 NOTE — ED CDU PROVIDER SUBSEQUENT DAY NOTE - HISTORY
Copd'er no at home oxygen 2/2 to not having compressor. serial nebs and 02 with destaturation to below 88 off 02. pending sw consult and dm education in am
Pt asymptomatic at this time. Awaiting SW placement. No pertinent interval hx. VS stable, no current complaints.

## 2019-05-09 NOTE — ED CDU PROVIDER SUBSEQUENT DAY NOTE - CONSTITUTIONAL, MLM
normal... Well appearing, well nourished, awake, alert, oriented to person, place, time/situation and in no apparent distress. On O2 via NC

## 2019-05-09 NOTE — ED CDU PROVIDER DISPOSITION NOTE - ATTENDING CONTRIBUTION TO CARE
I, Mary Back, participated in the care of this patient with the PA. I discussed the history and physical exam findings as well as lab results and plan of care with the PA. I agree with PA's history, physical and assessment.     Overnight patient with increasing blood sugar and decreasing blood pressure, BP medication held, patient requiring extra doses of insulin at top of sliding scale. Patient still awaiting placement for group home where he can have NC O2 which he needs. Patient will require admission for worsening blood sugar, now hypotension and multiple days of observation.

## 2019-05-09 NOTE — ED CDU PROVIDER SUBSEQUENT DAY NOTE - PMH
Bipolar 1 disorder    COPD (chronic obstructive pulmonary disease)    Coronary artery disease involving native coronary artery of native heart without angina pectoris    Gastroesophageal reflux disease, esophagitis presence not specified    HLD (hyperlipidemia)    Oxygen dependent    Pulmonary HTN    Smoker    Stented coronary artery    Type 2 diabetes mellitus without complication, with long-term current use of insulin
Bipolar 1 disorder    COPD (chronic obstructive pulmonary disease)    Coronary artery disease involving native coronary artery of native heart without angina pectoris    Gastroesophageal reflux disease, esophagitis presence not specified    HLD (hyperlipidemia)    Oxygen dependent    Pulmonary HTN    Smoker    Stented coronary artery    Type 2 diabetes mellitus without complication, with long-term current use of insulin

## 2019-05-09 NOTE — H&P ADULT - NSICDXPASTMEDICALHX_GEN_ALL_CORE_FT
PAST MEDICAL HISTORY:  Bipolar 1 disorder     COPD (chronic obstructive pulmonary disease)     Coronary artery disease involving native coronary artery of native heart without angina pectoris     Gastroesophageal reflux disease, esophagitis presence not specified     HLD (hyperlipidemia)     Oxygen dependent     Pulmonary HTN     Smoker     Stented coronary artery     Type 2 diabetes mellitus without complication, with long-term current use of insulin

## 2019-05-09 NOTE — H&P ADULT - HISTORY OF PRESENT ILLNESS
59 y/o male smoker with h/o COPD on home oxygen, Pulmonary HTN, CAD and stents, DM II, came to the ER because of SOB and productive cough x 2 days after ran out of home oxygen. no fever. patient was admitted to observation unit but patient developed uncontrolled blood sugar 2nd to steroids and hypotension. no chest pain no further SOB. patient feels better. c/o chronic lower back pain and neck pain since a an accident c/o anxiety and insomnia.

## 2019-05-09 NOTE — H&P ADULT - NSICDXFAMILYHX_GEN_ALL_CORE_FT
FAMILY HISTORY:  No family history of cardiovascular disease  No family history of chronic obstructive pulmonary disease  No family history of hypertension  No family history of mental disorder

## 2019-05-09 NOTE — ED CDU PROVIDER SUBSEQUENT DAY NOTE - RESPIRATORY, MLM
Breath sounds clear and equal bilaterally.
No resp distress. Breath sounds clear and equal bilaterally.

## 2019-05-09 NOTE — ED ADULT NURSE REASSESSMENT NOTE - NS ED NURSE REASSESS COMMENT FT1
Pt alert and oriented, no apparent distress noted at this time. Pt handed off to inna JENNINGS in stable condition.

## 2019-05-09 NOTE — ED CDU PROVIDER SUBSEQUENT DAY NOTE - ATTENDING CONTRIBUTION TO CARE
I, William Bateman, have personally performed a face to face diagnostic evaluation on this patient. I have reviewed the ACP note and agree with the history, exam and plan of care, except as noted.    59 yo M hx of HTN, DM,  CAD, COPD on home 2.5 L O2 p/w sob. He didn't have his oxygen at home and ran out of his meds. On exam, diffuse wheezing, no abdominal breathing, RRR, abdomen soft, no extremity edema. Trop negative. CXR clear. GLucose as high but no gap. patient on insulin sliding scale. patient received duoneb and solumedral. Patient in Obs for COPD treatement with nebs and steroid. Will continue to monitor
I agree with the PA's note and was available for any issues/concerns. I was directly involved in patient care. My brief overall assessment is as follows: no acute events over past 24 hours, pt stable and feeling well, case management/SW finishing up steps for long term placement with O2 therapy.

## 2019-05-10 LAB
GLUCOSE BLDC GLUCOMTR-MCNC: 330 MG/DL — HIGH (ref 70–99)
GLUCOSE BLDC GLUCOMTR-MCNC: 411 MG/DL — HIGH (ref 70–99)
GLUCOSE BLDC GLUCOMTR-MCNC: 509 MG/DL — CRITICAL HIGH (ref 70–99)
GLUCOSE BLDC GLUCOMTR-MCNC: 512 MG/DL — CRITICAL HIGH (ref 70–99)
GLUCOSE BLDC GLUCOMTR-MCNC: >530 MG/DL — CRITICAL HIGH (ref 70–99)

## 2019-05-10 PROCEDURE — 99232 SBSQ HOSP IP/OBS MODERATE 35: CPT

## 2019-05-10 RX ORDER — INSULIN LISPRO 100/ML
4 VIAL (ML) SUBCUTANEOUS ONCE
Refills: 0 | Status: COMPLETED | OUTPATIENT
Start: 2019-05-10 | End: 2019-05-10

## 2019-05-10 RX ORDER — INSULIN LISPRO 100/ML
8 VIAL (ML) SUBCUTANEOUS
Refills: 0 | Status: DISCONTINUED | OUTPATIENT
Start: 2019-05-10 | End: 2019-05-13

## 2019-05-10 RX ORDER — HUMAN INSULIN 100 [IU]/ML
10 INJECTION, SUSPENSION SUBCUTANEOUS ONCE
Refills: 0 | Status: COMPLETED | OUTPATIENT
Start: 2019-05-10 | End: 2019-05-10

## 2019-05-10 RX ORDER — INSULIN GLARGINE 100 [IU]/ML
50 INJECTION, SOLUTION SUBCUTANEOUS AT BEDTIME
Refills: 0 | Status: DISCONTINUED | OUTPATIENT
Start: 2019-05-10 | End: 2019-05-11

## 2019-05-10 RX ORDER — LANOLIN ALCOHOL/MO/W.PET/CERES
3 CREAM (GRAM) TOPICAL ONCE
Refills: 0 | Status: COMPLETED | OUTPATIENT
Start: 2019-05-10 | End: 2019-05-10

## 2019-05-10 RX ORDER — INSULIN LISPRO 100/ML
8 VIAL (ML) SUBCUTANEOUS ONCE
Refills: 0 | Status: COMPLETED | OUTPATIENT
Start: 2019-05-10 | End: 2019-05-10

## 2019-05-10 RX ADMIN — MIRTAZAPINE 7.5 MILLIGRAM(S): 45 TABLET, ORALLY DISINTEGRATING ORAL at 21:34

## 2019-05-10 RX ADMIN — HUMAN INSULIN 10 UNIT(S): 100 INJECTION, SUSPENSION SUBCUTANEOUS at 15:51

## 2019-05-10 RX ADMIN — AZITHROMYCIN 250 MILLIGRAM(S): 500 TABLET, FILM COATED ORAL at 21:34

## 2019-05-10 RX ADMIN — Medication 3 MILLILITER(S): at 03:37

## 2019-05-10 RX ADMIN — Medication 8 UNIT(S): at 16:44

## 2019-05-10 RX ADMIN — PANTOPRAZOLE SODIUM 40 MILLIGRAM(S): 20 TABLET, DELAYED RELEASE ORAL at 05:33

## 2019-05-10 RX ADMIN — Medication 3 MILLILITER(S): at 14:27

## 2019-05-10 RX ADMIN — Medication 1 PATCH: at 15:03

## 2019-05-10 RX ADMIN — INSULIN GLARGINE 50 UNIT(S): 100 INJECTION, SOLUTION SUBCUTANEOUS at 21:56

## 2019-05-10 RX ADMIN — Medication 81 MILLIGRAM(S): at 12:38

## 2019-05-10 RX ADMIN — Medication 12: at 08:01

## 2019-05-10 RX ADMIN — OXYCODONE AND ACETAMINOPHEN 1 TABLET(S): 5; 325 TABLET ORAL at 03:00

## 2019-05-10 RX ADMIN — Medication 40 MILLIGRAM(S): at 16:43

## 2019-05-10 RX ADMIN — Medication 2 UNIT(S): at 08:00

## 2019-05-10 RX ADMIN — ENOXAPARIN SODIUM 40 MILLIGRAM(S): 100 INJECTION SUBCUTANEOUS at 12:38

## 2019-05-10 RX ADMIN — Medication 12: at 16:44

## 2019-05-10 RX ADMIN — OXYCODONE AND ACETAMINOPHEN 1 TABLET(S): 5; 325 TABLET ORAL at 16:10

## 2019-05-10 RX ADMIN — CLOPIDOGREL BISULFATE 75 MILLIGRAM(S): 75 TABLET, FILM COATED ORAL at 12:38

## 2019-05-10 RX ADMIN — LISINOPRIL 2.5 MILLIGRAM(S): 2.5 TABLET ORAL at 05:33

## 2019-05-10 RX ADMIN — Medication 4 UNIT(S): at 22:37

## 2019-05-10 RX ADMIN — Medication 40 MILLIGRAM(S): at 05:33

## 2019-05-10 RX ADMIN — OXYCODONE AND ACETAMINOPHEN 1 TABLET(S): 5; 325 TABLET ORAL at 02:12

## 2019-05-10 RX ADMIN — Medication 3 MILLILITER(S): at 08:30

## 2019-05-10 RX ADMIN — Medication 1 MILLIGRAM(S): at 12:42

## 2019-05-10 RX ADMIN — Medication 3 MILLILITER(S): at 21:02

## 2019-05-10 RX ADMIN — OXYCODONE AND ACETAMINOPHEN 1 TABLET(S): 5; 325 TABLET ORAL at 15:07

## 2019-05-10 RX ADMIN — Medication 3 MILLIGRAM(S): at 02:12

## 2019-05-10 RX ADMIN — Medication 1 PATCH: at 12:39

## 2019-05-10 RX ADMIN — Medication 12: at 12:39

## 2019-05-10 RX ADMIN — ATORVASTATIN CALCIUM 40 MILLIGRAM(S): 80 TABLET, FILM COATED ORAL at 21:33

## 2019-05-10 RX ADMIN — Medication 8 UNIT(S): at 16:45

## 2019-05-10 RX ADMIN — Medication 1 MILLIGRAM(S): at 22:01

## 2019-05-10 RX ADMIN — Medication 1 PATCH: at 19:00

## 2019-05-10 RX ADMIN — Medication 8 UNIT(S): at 12:38

## 2019-05-11 LAB
ANION GAP SERPL CALC-SCNC: 11 MMOL/L — SIGNIFICANT CHANGE UP (ref 5–17)
BUN SERPL-MCNC: 16 MG/DL — SIGNIFICANT CHANGE UP (ref 8–20)
CALCIUM SERPL-MCNC: 8.1 MG/DL — LOW (ref 8.6–10.2)
CHLORIDE SERPL-SCNC: 98 MMOL/L — SIGNIFICANT CHANGE UP (ref 98–107)
CO2 SERPL-SCNC: 24 MMOL/L — SIGNIFICANT CHANGE UP (ref 22–29)
CREAT SERPL-MCNC: 0.54 MG/DL — SIGNIFICANT CHANGE UP (ref 0.5–1.3)
GLUCOSE BLDC GLUCOMTR-MCNC: 353 MG/DL — HIGH (ref 70–99)
GLUCOSE BLDC GLUCOMTR-MCNC: 358 MG/DL — HIGH (ref 70–99)
GLUCOSE BLDC GLUCOMTR-MCNC: 454 MG/DL — CRITICAL HIGH (ref 70–99)
GLUCOSE BLDC GLUCOMTR-MCNC: 489 MG/DL — CRITICAL HIGH (ref 70–99)
GLUCOSE SERPL-MCNC: 496 MG/DL — HIGH (ref 70–115)
HBA1C BLD-MCNC: 9.7 % — HIGH (ref 4–5.6)
HCT VFR BLD CALC: 39.5 % — LOW (ref 42–52)
HGB BLD-MCNC: 12.9 G/DL — LOW (ref 14–18)
MCHC RBC-ENTMCNC: 29.5 PG — SIGNIFICANT CHANGE UP (ref 27–31)
MCHC RBC-ENTMCNC: 32.7 G/DL — SIGNIFICANT CHANGE UP (ref 32–36)
MCV RBC AUTO: 90.4 FL — SIGNIFICANT CHANGE UP (ref 80–94)
PLATELET # BLD AUTO: 174 K/UL — SIGNIFICANT CHANGE UP (ref 150–400)
POTASSIUM SERPL-MCNC: 4.8 MMOL/L — SIGNIFICANT CHANGE UP (ref 3.5–5.3)
POTASSIUM SERPL-SCNC: 4.8 MMOL/L — SIGNIFICANT CHANGE UP (ref 3.5–5.3)
RBC # BLD: 4.37 M/UL — LOW (ref 4.6–6.2)
RBC # FLD: 13.5 % — SIGNIFICANT CHANGE UP (ref 11–15.6)
SODIUM SERPL-SCNC: 133 MMOL/L — LOW (ref 135–145)
WBC # BLD: 8.4 K/UL — SIGNIFICANT CHANGE UP (ref 4.8–10.8)
WBC # FLD AUTO: 8.4 K/UL — SIGNIFICANT CHANGE UP (ref 4.8–10.8)

## 2019-05-11 PROCEDURE — 99233 SBSQ HOSP IP/OBS HIGH 50: CPT

## 2019-05-11 RX ORDER — OXYCODONE AND ACETAMINOPHEN 5; 325 MG/1; MG/1
2 TABLET ORAL EVERY 6 HOURS
Refills: 0 | Status: DISCONTINUED | OUTPATIENT
Start: 2019-05-11 | End: 2019-05-13

## 2019-05-11 RX ORDER — ACETAMINOPHEN 500 MG
650 TABLET ORAL EVERY 6 HOURS
Refills: 0 | Status: DISCONTINUED | OUTPATIENT
Start: 2019-05-11 | End: 2019-05-13

## 2019-05-11 RX ORDER — INSULIN LISPRO 100/ML
12 VIAL (ML) SUBCUTANEOUS ONCE
Refills: 0 | Status: COMPLETED | OUTPATIENT
Start: 2019-05-11 | End: 2019-05-11

## 2019-05-11 RX ORDER — INSULIN GLARGINE 100 [IU]/ML
60 INJECTION, SOLUTION SUBCUTANEOUS AT BEDTIME
Refills: 0 | Status: DISCONTINUED | OUTPATIENT
Start: 2019-05-11 | End: 2019-05-12

## 2019-05-11 RX ADMIN — Medication 8 UNIT(S): at 16:15

## 2019-05-11 RX ADMIN — OXYCODONE AND ACETAMINOPHEN 1 TABLET(S): 5; 325 TABLET ORAL at 06:30

## 2019-05-11 RX ADMIN — Medication 10: at 08:04

## 2019-05-11 RX ADMIN — OXYCODONE AND ACETAMINOPHEN 2 TABLET(S): 5; 325 TABLET ORAL at 21:19

## 2019-05-11 RX ADMIN — MIRTAZAPINE 7.5 MILLIGRAM(S): 45 TABLET, ORALLY DISINTEGRATING ORAL at 21:19

## 2019-05-11 RX ADMIN — ENOXAPARIN SODIUM 40 MILLIGRAM(S): 100 INJECTION SUBCUTANEOUS at 11:42

## 2019-05-11 RX ADMIN — Medication 10: at 16:15

## 2019-05-11 RX ADMIN — Medication 1 PATCH: at 07:46

## 2019-05-11 RX ADMIN — Medication 3 MILLILITER(S): at 15:27

## 2019-05-11 RX ADMIN — Medication 12: at 11:43

## 2019-05-11 RX ADMIN — Medication 8 UNIT(S): at 08:04

## 2019-05-11 RX ADMIN — OXYCODONE AND ACETAMINOPHEN 2 TABLET(S): 5; 325 TABLET ORAL at 16:17

## 2019-05-11 RX ADMIN — Medication 1 PATCH: at 19:41

## 2019-05-11 RX ADMIN — CLOPIDOGREL BISULFATE 75 MILLIGRAM(S): 75 TABLET, FILM COATED ORAL at 11:42

## 2019-05-11 RX ADMIN — Medication 3 MILLILITER(S): at 03:18

## 2019-05-11 RX ADMIN — OXYCODONE AND ACETAMINOPHEN 1 TABLET(S): 5; 325 TABLET ORAL at 05:30

## 2019-05-11 RX ADMIN — Medication 1 PATCH: at 11:41

## 2019-05-11 RX ADMIN — Medication 650 MILLIGRAM(S): at 19:44

## 2019-05-11 RX ADMIN — Medication 40 MILLIGRAM(S): at 16:15

## 2019-05-11 RX ADMIN — OXYCODONE AND ACETAMINOPHEN 2 TABLET(S): 5; 325 TABLET ORAL at 17:17

## 2019-05-11 RX ADMIN — Medication 40 MILLIGRAM(S): at 05:27

## 2019-05-11 RX ADMIN — OXYCODONE AND ACETAMINOPHEN 2 TABLET(S): 5; 325 TABLET ORAL at 21:50

## 2019-05-11 RX ADMIN — Medication 650 MILLIGRAM(S): at 20:40

## 2019-05-11 RX ADMIN — Medication 1 MILLIGRAM(S): at 11:47

## 2019-05-11 RX ADMIN — Medication 3 MILLILITER(S): at 20:41

## 2019-05-11 RX ADMIN — PANTOPRAZOLE SODIUM 40 MILLIGRAM(S): 20 TABLET, DELAYED RELEASE ORAL at 05:27

## 2019-05-11 RX ADMIN — Medication 8 UNIT(S): at 11:42

## 2019-05-11 RX ADMIN — ATORVASTATIN CALCIUM 40 MILLIGRAM(S): 80 TABLET, FILM COATED ORAL at 21:19

## 2019-05-11 RX ADMIN — Medication 1 PATCH: at 07:38

## 2019-05-11 RX ADMIN — Medication 3 MILLILITER(S): at 08:57

## 2019-05-11 RX ADMIN — SODIUM CHLORIDE 80 MILLILITER(S): 9 INJECTION INTRAMUSCULAR; INTRAVENOUS; SUBCUTANEOUS at 05:27

## 2019-05-11 RX ADMIN — Medication 12 UNIT(S): at 23:01

## 2019-05-11 RX ADMIN — INSULIN GLARGINE 60 UNIT(S): 100 INJECTION, SOLUTION SUBCUTANEOUS at 22:03

## 2019-05-11 RX ADMIN — Medication 81 MILLIGRAM(S): at 11:42

## 2019-05-12 LAB
GLUCOSE BLDC GLUCOMTR-MCNC: 305 MG/DL — HIGH (ref 70–99)
GLUCOSE BLDC GLUCOMTR-MCNC: 306 MG/DL — HIGH (ref 70–99)
GLUCOSE BLDC GLUCOMTR-MCNC: 379 MG/DL — HIGH (ref 70–99)
GLUCOSE BLDC GLUCOMTR-MCNC: 440 MG/DL — HIGH (ref 70–99)

## 2019-05-12 PROCEDURE — 99232 SBSQ HOSP IP/OBS MODERATE 35: CPT

## 2019-05-12 RX ORDER — INSULIN GLARGINE 100 [IU]/ML
20 INJECTION, SOLUTION SUBCUTANEOUS ONCE
Refills: 0 | Status: COMPLETED | OUTPATIENT
Start: 2019-05-12 | End: 2019-05-12

## 2019-05-12 RX ORDER — INSULIN LISPRO 100/ML
6 VIAL (ML) SUBCUTANEOUS ONCE
Refills: 0 | Status: COMPLETED | OUTPATIENT
Start: 2019-05-12 | End: 2019-05-12

## 2019-05-12 RX ORDER — INSULIN GLARGINE 100 [IU]/ML
40 INJECTION, SOLUTION SUBCUTANEOUS
Refills: 0 | Status: DISCONTINUED | OUTPATIENT
Start: 2019-05-12 | End: 2019-05-13

## 2019-05-12 RX ORDER — HUMAN INSULIN 100 [IU]/ML
12 INJECTION, SUSPENSION SUBCUTANEOUS ONCE
Refills: 0 | Status: DISCONTINUED | OUTPATIENT
Start: 2019-05-12 | End: 2019-05-12

## 2019-05-12 RX ADMIN — OXYCODONE AND ACETAMINOPHEN 2 TABLET(S): 5; 325 TABLET ORAL at 13:03

## 2019-05-12 RX ADMIN — Medication 8: at 07:35

## 2019-05-12 RX ADMIN — OXYCODONE AND ACETAMINOPHEN 2 TABLET(S): 5; 325 TABLET ORAL at 22:24

## 2019-05-12 RX ADMIN — Medication 81 MILLIGRAM(S): at 11:27

## 2019-05-12 RX ADMIN — Medication 12: at 11:27

## 2019-05-12 RX ADMIN — Medication 1 PATCH: at 19:07

## 2019-05-12 RX ADMIN — Medication 8 UNIT(S): at 16:55

## 2019-05-12 RX ADMIN — INSULIN GLARGINE 20 UNIT(S): 100 INJECTION, SOLUTION SUBCUTANEOUS at 12:14

## 2019-05-12 RX ADMIN — Medication 6 UNIT(S): at 23:23

## 2019-05-12 RX ADMIN — Medication 40 MILLIGRAM(S): at 05:22

## 2019-05-12 RX ADMIN — Medication 3 MILLILITER(S): at 03:41

## 2019-05-12 RX ADMIN — OXYCODONE AND ACETAMINOPHEN 2 TABLET(S): 5; 325 TABLET ORAL at 12:03

## 2019-05-12 RX ADMIN — Medication 1 PATCH: at 11:26

## 2019-05-12 RX ADMIN — Medication 10: at 16:55

## 2019-05-12 RX ADMIN — Medication 3 MILLILITER(S): at 14:52

## 2019-05-12 RX ADMIN — INSULIN GLARGINE 40 UNIT(S): 100 INJECTION, SOLUTION SUBCUTANEOUS at 22:52

## 2019-05-12 RX ADMIN — SODIUM CHLORIDE 80 MILLILITER(S): 9 INJECTION INTRAMUSCULAR; INTRAVENOUS; SUBCUTANEOUS at 05:22

## 2019-05-12 RX ADMIN — LISINOPRIL 2.5 MILLIGRAM(S): 2.5 TABLET ORAL at 05:22

## 2019-05-12 RX ADMIN — Medication 3 MILLILITER(S): at 08:41

## 2019-05-12 RX ADMIN — Medication 1 PATCH: at 07:38

## 2019-05-12 RX ADMIN — MIRTAZAPINE 7.5 MILLIGRAM(S): 45 TABLET, ORALLY DISINTEGRATING ORAL at 22:24

## 2019-05-12 RX ADMIN — CLOPIDOGREL BISULFATE 75 MILLIGRAM(S): 75 TABLET, FILM COATED ORAL at 11:27

## 2019-05-12 RX ADMIN — OXYCODONE AND ACETAMINOPHEN 2 TABLET(S): 5; 325 TABLET ORAL at 05:36

## 2019-05-12 RX ADMIN — Medication 3 MILLILITER(S): at 19:44

## 2019-05-12 RX ADMIN — ENOXAPARIN SODIUM 40 MILLIGRAM(S): 100 INJECTION SUBCUTANEOUS at 11:27

## 2019-05-12 RX ADMIN — OXYCODONE AND ACETAMINOPHEN 2 TABLET(S): 5; 325 TABLET ORAL at 22:55

## 2019-05-12 RX ADMIN — Medication 1 PATCH: at 11:23

## 2019-05-12 RX ADMIN — PANTOPRAZOLE SODIUM 40 MILLIGRAM(S): 20 TABLET, DELAYED RELEASE ORAL at 05:21

## 2019-05-12 RX ADMIN — OXYCODONE AND ACETAMINOPHEN 2 TABLET(S): 5; 325 TABLET ORAL at 06:05

## 2019-05-12 RX ADMIN — Medication 8 UNIT(S): at 11:27

## 2019-05-12 RX ADMIN — Medication 8 UNIT(S): at 07:34

## 2019-05-12 RX ADMIN — ATORVASTATIN CALCIUM 40 MILLIGRAM(S): 80 TABLET, FILM COATED ORAL at 22:24

## 2019-05-13 ENCOUNTER — INBOUND DOCUMENT (OUTPATIENT)
Age: 59
End: 2019-05-13

## 2019-05-13 ENCOUNTER — TRANSCRIPTION ENCOUNTER (OUTPATIENT)
Age: 59
End: 2019-05-13

## 2019-05-13 VITALS
DIASTOLIC BLOOD PRESSURE: 50 MMHG | TEMPERATURE: 98 F | HEART RATE: 98 BPM | OXYGEN SATURATION: 95 % | SYSTOLIC BLOOD PRESSURE: 95 MMHG | RESPIRATION RATE: 18 BRPM

## 2019-05-13 LAB
GLUCOSE BLDC GLUCOMTR-MCNC: 202 MG/DL — HIGH (ref 70–99)
GLUCOSE BLDC GLUCOMTR-MCNC: 284 MG/DL — HIGH (ref 70–99)
GLUCOSE BLDC GLUCOMTR-MCNC: 372 MG/DL — HIGH (ref 70–99)
GLUCOSE BLDC GLUCOMTR-MCNC: 423 MG/DL — HIGH (ref 70–99)
GLUCOSE BLDC GLUCOMTR-MCNC: 427 MG/DL — HIGH (ref 70–99)

## 2019-05-13 PROCEDURE — 99239 HOSP IP/OBS DSCHRG MGMT >30: CPT

## 2019-05-13 RX ORDER — INSULIN GLARGINE 100 [IU]/ML
50 INJECTION, SOLUTION SUBCUTANEOUS
Qty: 0 | Refills: 0 | DISCHARGE
Start: 2019-05-13

## 2019-05-13 RX ORDER — INSULIN GLARGINE 100 [IU]/ML
66 INJECTION, SOLUTION SUBCUTANEOUS
Qty: 0 | Refills: 0 | DISCHARGE
Start: 2019-05-13

## 2019-05-13 RX ORDER — INSULIN GLARGINE 100 [IU]/ML
60 INJECTION, SOLUTION SUBCUTANEOUS
Qty: 0 | Refills: 0 | DISCHARGE
Start: 2019-05-13

## 2019-05-13 RX ORDER — INSULIN LISPRO 100/ML
8 VIAL (ML) SUBCUTANEOUS ONCE
Refills: 0 | Status: COMPLETED | OUTPATIENT
Start: 2019-05-13 | End: 2019-05-13

## 2019-05-13 RX ORDER — INSULIN GLARGINE 100 [IU]/ML
75 INJECTION, SOLUTION SUBCUTANEOUS
Qty: 0 | Refills: 0 | DISCHARGE
Start: 2019-05-13

## 2019-05-13 RX ORDER — INSULIN GLARGINE 100 [IU]/ML
40 INJECTION, SOLUTION SUBCUTANEOUS
Qty: 0 | Refills: 0 | DISCHARGE
Start: 2019-05-13

## 2019-05-13 RX ADMIN — Medication 3 MILLILITER(S): at 09:15

## 2019-05-13 RX ADMIN — LISINOPRIL 2.5 MILLIGRAM(S): 2.5 TABLET ORAL at 05:39

## 2019-05-13 RX ADMIN — Medication 4: at 08:13

## 2019-05-13 RX ADMIN — Medication 81 MILLIGRAM(S): at 12:20

## 2019-05-13 RX ADMIN — CLOPIDOGREL BISULFATE 75 MILLIGRAM(S): 75 TABLET, FILM COATED ORAL at 12:20

## 2019-05-13 RX ADMIN — OXYCODONE AND ACETAMINOPHEN 2 TABLET(S): 5; 325 TABLET ORAL at 08:17

## 2019-05-13 RX ADMIN — Medication 8 UNIT(S): at 15:40

## 2019-05-13 RX ADMIN — Medication 8 UNIT(S): at 08:20

## 2019-05-13 RX ADMIN — Medication 3 MILLILITER(S): at 15:04

## 2019-05-13 RX ADMIN — Medication 8 UNIT(S): at 12:19

## 2019-05-13 RX ADMIN — PANTOPRAZOLE SODIUM 40 MILLIGRAM(S): 20 TABLET, DELAYED RELEASE ORAL at 05:39

## 2019-05-13 RX ADMIN — INSULIN GLARGINE 40 UNIT(S): 100 INJECTION, SOLUTION SUBCUTANEOUS at 08:13

## 2019-05-13 RX ADMIN — Medication 40 MILLIGRAM(S): at 05:39

## 2019-05-13 RX ADMIN — Medication 3 MILLILITER(S): at 03:30

## 2019-05-13 RX ADMIN — Medication 1 PATCH: at 07:51

## 2019-05-13 RX ADMIN — Medication 1 PATCH: at 12:26

## 2019-05-13 RX ADMIN — Medication 12: at 12:19

## 2019-05-13 RX ADMIN — OXYCODONE AND ACETAMINOPHEN 2 TABLET(S): 5; 325 TABLET ORAL at 16:20

## 2019-05-13 RX ADMIN — OXYCODONE AND ACETAMINOPHEN 2 TABLET(S): 5; 325 TABLET ORAL at 15:39

## 2019-05-13 RX ADMIN — OXYCODONE AND ACETAMINOPHEN 2 TABLET(S): 5; 325 TABLET ORAL at 09:00

## 2019-05-13 RX ADMIN — Medication 1 MILLIGRAM(S): at 05:42

## 2019-05-13 RX ADMIN — ENOXAPARIN SODIUM 40 MILLIGRAM(S): 100 INJECTION SUBCUTANEOUS at 12:20

## 2019-05-13 RX ADMIN — Medication 1 PATCH: at 12:20

## 2019-05-13 NOTE — PROGRESS NOTE ADULT - ASSESSMENT
57 y/o male with uncontrolled DM with hyperglycemia, COPD exacerbation, CAD, smoker
59 y/o male with uncontrolled DM with hyperglycemia, COPD exacerbation, CAD, smoker
57 y/o male with uncontrolled DM with hyperglycemia, COPD exacerbation, CAD, smoker
57 y/o male with uncontrolled DM with hyperglycemia, COPD exacerbation, CAD, smoker

## 2019-05-13 NOTE — PROGRESS NOTE ADULT - PROBLEM SELECTOR PLAN 6
percocet prn.  Confirmed dosing [place] - last rx filled 3/2019.  Outpatient pain mgt followup.
percocet prn
percocet prn.  Confirmed dosing on [place] - last rx filled 3/2019.  Outpatient pain mgt followup.
percocet prn.  Confirmed dosing on [place] - last rx filled 3/2019.  Outpatient pain mgt followup.

## 2019-05-13 NOTE — DISCHARGE NOTE PROVIDER - CARE PROVIDERS DIRECT ADDRESSES
[email or web address]

## 2019-05-13 NOTE — DISCHARGE NOTE PROVIDER - NSDCCPCAREPLAN_GEN_ALL_CORE_FT
PRINCIPAL DISCHARGE DIAGNOSIS  Diagnosis: COPD exacerbation  Assessment and Plan of Treatment:       SECONDARY DISCHARGE DIAGNOSES  Diagnosis: Chronic low back pain, unspecified back pain laterality, with sciatica presence unspecified  Assessment and Plan of Treatment: Chronic low back pain, unspecified back pain laterality, with sciatica presence unspecified    Diagnosis: Smoker  Assessment and Plan of Treatment: Smoker    Diagnosis: Coronary artery disease involving native coronary artery of native heart without angina pectoris  Assessment and Plan of Treatment: Coronary artery disease involving native coronary artery of native heart without angina pectoris    Diagnosis: Type 2 diabetes mellitus with hyperglycemia, with long-term current use of insulin  Assessment and Plan of Treatment: Type 2 diabetes mellitus with hyperglycemia, with long-term current use of insulin    Diagnosis: Hyperglycemia  Assessment and Plan of Treatment:

## 2019-05-13 NOTE — DISCHARGE NOTE PROVIDER - HOSPITAL COURSE
Initial HPI:    57 y/o male smoker with h/o COPD on home oxygen, Pulmonary HTN, CAD and stents, DM II, came to the ER because of SOB and productive cough x 2 days after ran out of home oxygen. no fever. patient was admitted to observation unit but patient developed uncontrolled blood sugar 2nd to steroids and hypotension.  Admitted for hyperglycemia, acute COPD exacerbation.  Responded to steroids, nebulizers.  Diabetic regimen resumed, steroids tapered to oral prednisone with better glycemic control.  Ha1c 9.    Interval History:    Today denies complaints.  No chest pain / palpitations. No SOB.  Chronic back pain controlled.         57 y/o male with uncontrolled DM with hyperglycemia, COPD exacerbation, CAD, smoker    Problem/Plan - 1:    ·  Problem: Type 2 diabetes mellitus with hyperglycemia, with long-term current use of insulin.  Plan: AM FS better controlled with BID Lantus, premeal insulin.  Monitor FS, as they are expected to improve somewhat with steroid taper.     Problem/Plan - 2:    ·  Problem: COPD exacerbation.  Plan: Improved.  Duoneb.  PO prednisone taper.  Add Advair on discharge.  Outpatient pulmonary evaluation.     Problem/Plan - 3:    ·  Problem: Hypotension, unspecified hypotension type.  Plan: BP stable.     Problem/Plan - 4:    ·  Problem: Coronary artery disease involving native coronary artery of native heart without angina pectoris.  Plan: aspirin and plavix. atorvastatin.     Problem/Plan - 5:    ·  Problem: Smoker.  Plan: counseling. nicotine patch.     Problem/Plan - 6:    Problem: Chronic low back pain, unspecified back pain laterality, with sciatica presence unspecified. Plan: percocet prn.  Confirmed dosing on [place] - last rx filled 3/2019.  Outpatient pain mgt followup.        Disposition: Stable for discharge.  Outpatient followup discussed.    Total time spent on discharge is  50 minutes.

## 2019-05-13 NOTE — PHYSICAL THERAPY INITIAL EVALUATION ADULT - ADDITIONAL COMMENTS
per patient he was having trouble caring for himself. Pt does not have steps nor does he use an AD however feels he may need an AD and assistance. His friends are unable to care for him. Pt has been accepted to [facility] for [facility].

## 2019-05-13 NOTE — PROGRESS NOTE ADULT - PROBLEM SELECTOR PLAN 5
counseling. nicotine patch.  > Smoker - Nicotine Patch.  I strongly encouraged the patient to quit smoking.  I outlined the extensive negative impact of smoking on quality of life and the numerous consequences of continued smoking including lung disease, heart attack, stroke, vascular disease, malignancy, increased mortality, etc.  The patient understood these effects and agreed on smoking cessation.
counseling. nicotine patch.
counseling. nicotine patch.  > Smoker - Nicotine Patch.  I strongly encouraged the patient to quit smoking.  I outlined the extensive negative impact of smoking on quality of life and the numerous consequences of continued smoking including lung disease, heart attack, stroke, vascular disease, malignancy, increased mortality, etc.  The patient understood these effects and agreed on smoking cessation.
counseling. nicotine patch.  > Smoker - Nicotine Patch.  I strongly encouraged the patient to quit smoking.  I outlined the extensive negative impact of smoking on quality of life and the numerous consequences of continued smoking including lung disease, heart attack, stroke, vascular disease, malignancy, increased mortality, etc.  The patient understood these effects and agreed on smoking cessation.

## 2019-05-13 NOTE — PROGRESS NOTE ADULT - PROBLEM SELECTOR PLAN 4
aspirin and plavix. atorvastatin

## 2019-05-13 NOTE — PROGRESS NOTE ADULT - SUBJECTIVE AND OBJECTIVE BOX
Patient: DEVIKA CARBALLO [id number] 58y Male                           Internal Medicine Hospitalist Progress Note    Initial HPI:  57 y/o male smoker with h/o COPD on home oxygen, Pulmonary HTN, CAD and stents, DM II, came to the ER because of SOB and productive cough x 2 days after ran out of home oxygen. no fever. patient was admitted to observation unit but patient developed uncontrolled blood sugar 2nd to steroids and hypotension.  Admitted for hyperglycemia, acute COPD exacerbation.  Responded to steroids, nebulizers.  Diabetic regimen resumed, steroids tapered to oral prednisone with better glycemic control.  Ha1c 9.  Interval History:  Today denies complaints.  No chest pain / palpitations. No SOB.  Chronic back pain controlled.     ____________________PHYSICAL EXAM:  Vitals reviewed as indicated below  GENERAL:  NAD Alert and Oriented x 3   HEENT: NCAT  CARDIOVASCULAR:  S1, S2  LUNGS: Coarse BS b/l  ABDOMEN:  soft, (-) tenderness, (-) distension, (+) bowel sounds, (-) guarding, (-) rebound (-) rigidity  EXTREMITIES:  no cyanosis / clubbing / edema.   ____________________    VITALS:  Vital Signs Last 24 Hrs  T(C): 36.6 (13 May 2019 04:54), Max: 36.6 (12 May 2019 10:55)  T(F): 97.8 (13 May 2019 04:54), Max: 97.8 (12 May 2019 10:55)  HR: 70 (13 May 2019 09:15) (65 - 90)  BP: 128/84 (13 May 2019 04:54) (98/58 - 128/84)  BP(mean): --  RR: 18 (12 May 2019 22:20) (18 - 18)  SpO2: 95% (13 May 2019 09:15) (94% - 96%) Daily     Daily   CAPILLARY BLOOD GLUCOSE      POCT Blood Glucose.: 202 mg/dL (13 May 2019 07:44)  POCT Blood Glucose.: 305 mg/dL (12 May 2019 22:52)  POCT Blood Glucose.: 379 mg/dL (12 May 2019 16:22)  POCT Blood Glucose.: 440 mg/dL (12 May 2019 11:14)    I&O's Summary    12 May 2019 07:01  -  13 May 2019 07:00  --------------------------------------------------------  IN: 1360 mL / OUT: 3000 mL / NET: -1640 mL        LABS:                        12.9   8.4   )-----------( 174      ( 11 May 2019 10:24 )             39.5     05-11    133<L>  |  98  |  16.0  ----------------------------<  496<H>  4.8   |  24.0  |  0.54    Ca    8.1<L>      11 May 2019 10:24                    MEDICATIONS:  acetaminophen   Tablet .. 650 milliGRAM(s) Oral every 6 hours PRN  ALBUTerol/ipratropium for Nebulization 3 milliLiter(s) Nebulizer every 6 hours  ALPRAZolam 1 milliGRAM(s) Oral two times a day PRN  aspirin enteric coated 81 milliGRAM(s) Oral daily  atorvastatin 40 milliGRAM(s) Oral at bedtime  clopidogrel Tablet 75 milliGRAM(s) Oral daily  dextrose 40% Gel 15 Gram(s) Oral once PRN  dextrose 5%. 1000 milliLiter(s) IV Continuous <Continuous>  dextrose 50% Injectable 12.5 Gram(s) IV Push once  dextrose 50% Injectable 25 Gram(s) IV Push once  dextrose 50% Injectable 25 Gram(s) IV Push once  enoxaparin Injectable 40 milliGRAM(s) SubCutaneous daily  glucagon  Injectable 1 milliGRAM(s) IntraMuscular once PRN  insulin glargine Injectable (LANTUS) 40 Unit(s) SubCutaneous two times a day  insulin lispro (HumaLOG) corrective regimen sliding scale   SubCutaneous three times a day before meals  insulin lispro Injectable (HumaLOG) 8 Unit(s) SubCutaneous before breakfast  insulin lispro Injectable (HumaLOG) 8 Unit(s) SubCutaneous before lunch  insulin lispro Injectable (HumaLOG) 8 Unit(s) SubCutaneous before dinner  lisinopril 2.5 milliGRAM(s) Oral daily  mirtazapine 7.5 milliGRAM(s) Oral daily  nicotine -   7 mG/24Hr(s) Patch 1 patch Transdermal daily  oxyCODONE    5 mG/acetaminophen 325 mG 2 Tablet(s) Oral every 6 hours PRN  pantoprazole    Tablet 40 milliGRAM(s) Oral before breakfast  predniSONE   Tablet 40 milliGRAM(s) Oral daily
Patient: DEVIKA CARBALLO [id number] 58y Male                           Internal Medicine Hospitalist Progress Note    Initial HPI:  57 y/o male smoker with h/o COPD on home oxygen, Pulmonary HTN, CAD and stents, DM II, came to the ER because of SOB and productive cough x 2 days after ran out of home oxygen. no fever. patient was admitted to observation unit but patient developed uncontrolled blood sugar 2nd to steroids and hypotension.  Admitted for hyperglycemia, acute COPD exacerbation.   Interval History:  Today, reports feeling much better.  No SOB.  no cough / fever/ chills.  FS remains elevated.      ____________________PHYSICAL EXAM:  Vitals reviewed as indicated below  GENERAL:  NAD Alert and Oriented x 3   HEENT: NCAT  CARDIOVASCULAR:  S1, S2  LUNGS: CTAB  ABDOMEN:  soft, (-) tenderness, (-) distension, (+) bowel sounds, (-) guarding, (-) rebound (-) rigidity  EXTREMITIES:  no cyanosis / clubbing / edema.   ____________________      BACKGROUND:  HEALTH ISSUES - PROBLEM Dx:  Chronic low back pain, unspecified back pain laterality, with sciatica presence unspecified: Chronic low back pain, unspecified back pain laterality, with sciatica presence unspecified  Smoker: Smoker  Coronary artery disease involving native coronary artery of native heart without angina pectoris: Coronary artery disease involving native coronary artery of native heart without angina pectoris  Hypotension, unspecified hypotension type: Hypotension, unspecified hypotension type  COPD exacerbation: COPD exacerbation  Type 2 diabetes mellitus with hyperglycemia, with long-term current use of insulin: Type 2 diabetes mellitus with hyperglycemia, with long-term current use of insulin        Allergies    No Known Allergies    Intolerances      PAST MEDICAL & SURGICAL HISTORY:  Oxygen dependent  Gastroesophageal reflux disease, esophagitis presence not specified  Smoker  Bipolar 1 disorder  Coronary artery disease involving native coronary artery of native heart without angina pectoris  Type 2 diabetes mellitus without complication, with long-term current use of insulin  Pulmonary HTN  HLD (hyperlipidemia)  Stented coronary artery  COPD (chronic obstructive pulmonary disease)  No significant past surgical history        VITALS:  Vital Signs Last 24 Hrs  T(C): 36.6 (10 May 2019 11:02), Max: 36.7 (10 May 2019 04:32)  T(F): 97.8 (10 May 2019 11:02), Max: 98 (10 May 2019 04:32)  HR: 85 (10 May 2019 11:02) (63 - 97)  BP: 119/69 (10 May 2019 11:02) (94/53 - 153/99)  BP(mean): --  RR: 18 (10 May 2019 11:02) (17 - 18)  SpO2: 94% (10 May 2019 11:02) (90% - 98%) Daily     Daily   CAPILLARY BLOOD GLUCOSE      POCT Blood Glucose.: 512 mg/dL (10 May 2019 12:20)  POCT Blood Glucose.: 509 mg/dL (10 May 2019 07:45)  POCT Blood Glucose.: >530 mg/dL (10 May 2019 07:44)  POCT Blood Glucose.: 390 mg/dL (09 May 2019 21:57)  POCT Blood Glucose.: 475 mg/dL (09 May 2019 18:40)  POCT Blood Glucose.: >530 mg/dL (09 May 2019 18:39)  POCT Blood Glucose.: 496 mg/dL (09 May 2019 17:43)  POCT Blood Glucose.: 507 mg/dL (09 May 2019 17:42)    I&O's Summary    09 May 2019 07:01  -  10 May 2019 07:00  --------------------------------------------------------  IN: 0 mL / OUT: 600 mL / NET: -600 mL    10 May 2019 07:01  -  10 May 2019 12:40  --------------------------------------------------------  IN: 360 mL / OUT: 800 mL / NET: -440 mL          LABS:    05-09    134<L>  |  97<L>  |  24.0<H>  ----------------------------<  382<H>  5.0   |  25.0  |  0.61    Ca    8.5<L>      09 May 2019 08:45                    MEDICATIONS:  MEDICATIONS  (STANDING):  ALBUTerol/ipratropium for Nebulization 3 milliLiter(s) Nebulizer every 6 hours  aspirin enteric coated 81 milliGRAM(s) Oral daily  atorvastatin 40 milliGRAM(s) Oral at bedtime  azithromycin   Tablet 250 milliGRAM(s) Oral every 24 hours  clopidogrel Tablet 75 milliGRAM(s) Oral daily  dextrose 5%. 1000 milliLiter(s) (50 mL/Hr) IV Continuous <Continuous>  dextrose 50% Injectable 12.5 Gram(s) IV Push once  dextrose 50% Injectable 25 Gram(s) IV Push once  dextrose 50% Injectable 25 Gram(s) IV Push once  enoxaparin Injectable 40 milliGRAM(s) SubCutaneous daily  insulin glargine Injectable (LANTUS) 50 Unit(s) SubCutaneous at bedtime  insulin lispro (HumaLOG) corrective regimen sliding scale   SubCutaneous three times a day before meals  insulin lispro Injectable (HumaLOG) 8 Unit(s) SubCutaneous before breakfast  insulin lispro Injectable (HumaLOG) 8 Unit(s) SubCutaneous before lunch  insulin lispro Injectable (HumaLOG) 8 Unit(s) SubCutaneous before dinner  insulin NPH human recombinant 10 Unit(s) SubCutaneous once  lisinopril 2.5 milliGRAM(s) Oral daily  methylPREDNISolone sodium succinate Injectable 40 milliGRAM(s) IV Push every 12 hours  mirtazapine 7.5 milliGRAM(s) Oral daily  nicotine -   7 mG/24Hr(s) Patch 1 patch Transdermal daily  pantoprazole    Tablet 40 milliGRAM(s) Oral before breakfast  sodium chloride 0.9%. 1000 milliLiter(s) (80 mL/Hr) IV Continuous <Continuous>    MEDICATIONS  (PRN):  ALPRAZolam 1 milliGRAM(s) Oral two times a day PRN Anxiety  dextrose 40% Gel 15 Gram(s) Oral once PRN Blood Glucose LESS THAN 70 milliGRAM(s)/deciliter  glucagon  Injectable 1 milliGRAM(s) IntraMuscular once PRN Glucose LESS THAN 70 milligrams/deciliter  oxyCODONE    5 mG/acetaminophen 325 mG 1 Tablet(s) Oral every 12 hours PRN Severe Pain (7 - 10)
Patient: DEVIKA CARBALLO [id number] 58y Male                           Internal Medicine Hospitalist Progress Note    Initial HPI:  59 y/o male smoker with h/o COPD on home oxygen, Pulmonary HTN, CAD and stents, DM II, came to the ER because of SOB and productive cough x 2 days after ran out of home oxygen. no fever. patient was admitted to observation unit but patient developed uncontrolled blood sugar 2nd to steroids and hypotension.  Admitted for hyperglycemia, acute COPD exacerbation.   Interval History:  Seen with RN.  Reports feeling much better.  No SOB.  no cough / fever/ chills.  FS remains elevated.      ____________________PHYSICAL EXAM:  Vitals reviewed as indicated below  GENERAL:  NAD Alert and Oriented x 3   HEENT: NCAT  CARDIOVASCULAR:  S1, S2  LUNGS: Coarse BS b/l  ABDOMEN:  soft, (-) tenderness, (-) distension, (+) bowel sounds, (-) guarding, (-) rebound (-) rigidity  EXTREMITIES:  no cyanosis / clubbing / edema.   ____________________    VITALS:  Vital Signs Last 24 Hrs  T(C): 36.5 (11 May 2019 11:19), Max: 36.6 (11 May 2019 04:57)  T(F): 97.7 (11 May 2019 11:19), Max: 97.9 (11 May 2019 04:57)  HR: 92 (11 May 2019 11:19) (67 - 96)  BP: 112/66 (11 May 2019 11:19) (91/53 - 112/66)  BP(mean): --  RR: 18 (11 May 2019 11:19) (18 - 20)  SpO2: 98% (11 May 2019 11:16) (93% - 98%) Daily     Daily   CAPILLARY BLOOD GLUCOSE      POCT Blood Glucose.: 454 mg/dL (11 May 2019 11:29)  POCT Blood Glucose.: 353 mg/dL (11 May 2019 07:33)  POCT Blood Glucose.: 330 mg/dL (10 May 2019 21:39)  POCT Blood Glucose.: 411 mg/dL (10 May 2019 16:34)  POCT Blood Glucose.: 512 mg/dL (10 May 2019 12:20)    I&O's Summary    10 May 2019 07:01  -  11 May 2019 07:00  --------------------------------------------------------  IN: 2200 mL / OUT: 2175 mL / NET: 25 mL        LABS:    05-11    133<L>  |  98  |  16.0  ----------------------------<  496<H>  4.8   |  24.0  |  0.54    Ca    8.1<L>      11 May 2019 10:24                    MEDICATIONS:  acetaminophen   Tablet .. 650 milliGRAM(s) Oral every 6 hours PRN  ALBUTerol/ipratropium for Nebulization 3 milliLiter(s) Nebulizer every 6 hours  ALPRAZolam 1 milliGRAM(s) Oral two times a day PRN  aspirin enteric coated 81 milliGRAM(s) Oral daily  atorvastatin 40 milliGRAM(s) Oral at bedtime  clopidogrel Tablet 75 milliGRAM(s) Oral daily  dextrose 40% Gel 15 Gram(s) Oral once PRN  dextrose 5%. 1000 milliLiter(s) IV Continuous <Continuous>  dextrose 50% Injectable 12.5 Gram(s) IV Push once  dextrose 50% Injectable 25 Gram(s) IV Push once  dextrose 50% Injectable 25 Gram(s) IV Push once  enoxaparin Injectable 40 milliGRAM(s) SubCutaneous daily  glucagon  Injectable 1 milliGRAM(s) IntraMuscular once PRN  insulin glargine Injectable (LANTUS) 60 Unit(s) SubCutaneous at bedtime  insulin lispro (HumaLOG) corrective regimen sliding scale   SubCutaneous three times a day before meals  insulin lispro Injectable (HumaLOG) 8 Unit(s) SubCutaneous before breakfast  insulin lispro Injectable (HumaLOG) 8 Unit(s) SubCutaneous before lunch  insulin lispro Injectable (HumaLOG) 8 Unit(s) SubCutaneous before dinner  lisinopril 2.5 milliGRAM(s) Oral daily  mirtazapine 7.5 milliGRAM(s) Oral daily  nicotine -   7 mG/24Hr(s) Patch 1 patch Transdermal daily  oxyCODONE    5 mG/acetaminophen 325 mG 2 Tablet(s) Oral every 6 hours PRN  pantoprazole    Tablet 40 milliGRAM(s) Oral before breakfast  predniSONE   Tablet 40 milliGRAM(s) Oral daily  sodium chloride 0.9%. 1000 milliLiter(s) IV Continuous <Continuous>
Patient: DEVIKA CARBALLO [id number] 58y Male                           Internal Medicine Hospitalist Progress Note    Initial HPI:  59 y/o male smoker with h/o COPD on home oxygen, Pulmonary HTN, CAD and stents, DM II, came to the ER because of SOB and productive cough x 2 days after ran out of home oxygen. no fever. patient was admitted to observation unit but patient developed uncontrolled blood sugar 2nd to steroids and hypotension.  Admitted for hyperglycemia, acute COPD exacerbation.   Interval History:  Denies SOB.  Reports at home on Lantus 40 units BID.  FS remain elevated.  Overall feels better.  States he is homeless.     ____________________PHYSICAL EXAM:  Vitals reviewed as indicated below  GENERAL:  NAD Alert and Oriented x 3   HEENT: NCAT  CARDIOVASCULAR:  S1, S2  LUNGS: Coarse BS b/l  ABDOMEN:  soft, (-) tenderness, (-) distension, (+) bowel sounds, (-) guarding, (-) rebound (-) rigidity  EXTREMITIES:  no cyanosis / clubbing / edema.   ____________________    VITALS:  Vital Signs Last 24 Hrs  T(C): 36.6 (12 May 2019 10:55), Max: 36.6 (11 May 2019 17:07)  T(F): 97.8 (12 May 2019 10:55), Max: 97.8 (11 May 2019 17:07)  HR: 90 (12 May 2019 10:55) (65 - 90)  BP: 108/65 (12 May 2019 10:55) (104/65 - 124/78)  BP(mean): --  RR: 18 (12 May 2019 10:55) (18 - 18)  SpO2: 100% (12 May 2019 08:55) (93% - 100%) Daily     Daily   CAPILLARY BLOOD GLUCOSE      POCT Blood Glucose.: 440 mg/dL (12 May 2019 11:14)  POCT Blood Glucose.: 306 mg/dL (12 May 2019 07:23)  POCT Blood Glucose.: 489 mg/dL (11 May 2019 21:59)  POCT Blood Glucose.: 358 mg/dL (11 May 2019 16:07)    I&O's Summary    11 May 2019 07:01  -  12 May 2019 07:00  --------------------------------------------------------  IN: 1840 mL / OUT: 2260 mL / NET: -420 mL    12 May 2019 07:01  -  12 May 2019 14:06  --------------------------------------------------------  IN: 0 mL / OUT: 500 mL / NET: -500 mL        LABS:                        12.9   8.4   )-----------( 174      ( 11 May 2019 10:24 )             39.5     05-11    133<L>  |  98  |  16.0  ----------------------------<  496<H>  4.8   |  24.0  |  0.54    Ca    8.1<L>      11 May 2019 10:24                    MEDICATIONS:  acetaminophen   Tablet .. 650 milliGRAM(s) Oral every 6 hours PRN  ALBUTerol/ipratropium for Nebulization 3 milliLiter(s) Nebulizer every 6 hours  ALPRAZolam 1 milliGRAM(s) Oral two times a day PRN  aspirin enteric coated 81 milliGRAM(s) Oral daily  atorvastatin 40 milliGRAM(s) Oral at bedtime  clopidogrel Tablet 75 milliGRAM(s) Oral daily  dextrose 40% Gel 15 Gram(s) Oral once PRN  dextrose 5%. 1000 milliLiter(s) IV Continuous <Continuous>  dextrose 50% Injectable 12.5 Gram(s) IV Push once  dextrose 50% Injectable 25 Gram(s) IV Push once  dextrose 50% Injectable 25 Gram(s) IV Push once  enoxaparin Injectable 40 milliGRAM(s) SubCutaneous daily  glucagon  Injectable 1 milliGRAM(s) IntraMuscular once PRN  insulin glargine Injectable (LANTUS) 40 Unit(s) SubCutaneous two times a day  insulin lispro (HumaLOG) corrective regimen sliding scale   SubCutaneous three times a day before meals  insulin lispro Injectable (HumaLOG) 8 Unit(s) SubCutaneous before breakfast  insulin lispro Injectable (HumaLOG) 8 Unit(s) SubCutaneous before lunch  insulin lispro Injectable (HumaLOG) 8 Unit(s) SubCutaneous before dinner  lisinopril 2.5 milliGRAM(s) Oral daily  mirtazapine 7.5 milliGRAM(s) Oral daily  nicotine -   7 mG/24Hr(s) Patch 1 patch Transdermal daily  oxyCODONE    5 mG/acetaminophen 325 mG 2 Tablet(s) Oral every 6 hours PRN  pantoprazole    Tablet 40 milliGRAM(s) Oral before breakfast  predniSONE   Tablet 40 milliGRAM(s) Oral daily  sodium chloride 0.9%. 1000 milliLiter(s) IV Continuous <Continuous>

## 2019-05-13 NOTE — DISCHARGE NOTE NURSING/CASE MANAGEMENT/SOCIAL WORK - NSDCDPATPORTLINK_GEN_ALL_CORE
You can access the [employer] Patient Portal, offered by [facility], by registering with the following website: [email or web address]

## 2019-05-13 NOTE — PROGRESS NOTE ADULT - PROBLEM SELECTOR PROBLEM 3
Hypotension, unspecified hypotension type

## 2019-05-13 NOTE — PROGRESS NOTE ADULT - PROBLEM SELECTOR PLAN 1
FS elevated.  Added NPH, resume Lantus BID, premeal insulin.  Off Solumedrol
AM FS better controlled with BID Lantus, premeal insulin.  Monitor FS, as they are expected to improve somewhat with steroid taper.
Lantus increased.  Solumedrol decreased.  Add premeal insulin.  Cover with NPH until Lantus dosing tonight.
FS slightly better, still elevated.  D/c Solumedrol.  Continue premeal insulin.  Increase Lantus dosing tonight.

## 2019-05-13 NOTE — PROGRESS NOTE ADULT - PROBLEM SELECTOR PROBLEM 6
Chronic low back pain, unspecified back pain laterality, with sciatica presence unspecified

## 2019-05-13 NOTE — DISCHARGE NOTE PROVIDER - CARE PROVIDER_API CALL
Edward Jenkins)  Critical Care Medicine; Pulmonary Disease; Sleep Medicine  [address] [place] [address]  Phone: [phone]  Fax: [phone]  Follow Up Time:

## 2019-05-13 NOTE — PROGRESS NOTE ADULT - PROBLEM SELECTOR PLAN 2
Duoneb. Azithromycin. Solu medrol.  PO prednisone taper.
Duoneb. Azithromycin. Solu medrol.  Taper steroids.
Improved.  Duoneb.  PO prednisone taper.  Add Advair on discharge.  Outpatient pulmonary evaluation.
Duoneb. Azithromycin. Solu medrol.  Taper steroids to po

## 2019-05-21 LAB
HCV AB S/CO SERPL IA: 0.14 S/CO — SIGNIFICANT CHANGE UP (ref 0–0.99)
HCV AB SERPL-IMP: SIGNIFICANT CHANGE UP

## 2019-07-10 PROCEDURE — 84484 ASSAY OF TROPONIN QUANT: CPT

## 2019-07-10 PROCEDURE — 80048 BASIC METABOLIC PNL TOTAL CA: CPT

## 2019-07-10 PROCEDURE — 82962 GLUCOSE BLOOD TEST: CPT

## 2019-07-10 PROCEDURE — 99285 EMERGENCY DEPT VISIT HI MDM: CPT | Mod: 25

## 2019-07-10 PROCEDURE — 85730 THROMBOPLASTIN TIME PARTIAL: CPT

## 2019-07-10 PROCEDURE — 96375 TX/PRO/DX INJ NEW DRUG ADDON: CPT

## 2019-07-10 PROCEDURE — 71045 X-RAY EXAM CHEST 1 VIEW: CPT

## 2019-07-10 PROCEDURE — G0378: CPT

## 2019-07-10 PROCEDURE — 85027 COMPLETE CBC AUTOMATED: CPT

## 2019-07-10 PROCEDURE — 96368 THER/DIAG CONCURRENT INF: CPT

## 2019-07-10 PROCEDURE — 83605 ASSAY OF LACTIC ACID: CPT

## 2019-07-10 PROCEDURE — 85014 HEMATOCRIT: CPT

## 2019-07-10 PROCEDURE — 82803 BLOOD GASES ANY COMBINATION: CPT

## 2019-07-10 PROCEDURE — 94640 AIRWAY INHALATION TREATMENT: CPT

## 2019-07-10 PROCEDURE — 36415 COLL VENOUS BLD VENIPUNCTURE: CPT

## 2019-07-10 PROCEDURE — 83036 HEMOGLOBIN GLYCOSYLATED A1C: CPT

## 2019-07-10 PROCEDURE — 82947 ASSAY GLUCOSE BLOOD QUANT: CPT

## 2019-07-10 PROCEDURE — 80053 COMPREHEN METABOLIC PANEL: CPT

## 2019-07-10 PROCEDURE — 84295 ASSAY OF SERUM SODIUM: CPT

## 2019-07-10 PROCEDURE — 83880 ASSAY OF NATRIURETIC PEPTIDE: CPT

## 2019-07-10 PROCEDURE — 82435 ASSAY OF BLOOD CHLORIDE: CPT

## 2019-07-10 PROCEDURE — 84132 ASSAY OF SERUM POTASSIUM: CPT

## 2019-07-10 PROCEDURE — 85610 PROTHROMBIN TIME: CPT

## 2019-07-10 PROCEDURE — 86803 HEPATITIS C AB TEST: CPT

## 2019-07-10 PROCEDURE — 96366 THER/PROPH/DIAG IV INF ADDON: CPT

## 2019-07-10 PROCEDURE — 82330 ASSAY OF CALCIUM: CPT

## 2019-07-10 PROCEDURE — 96361 HYDRATE IV INFUSION ADD-ON: CPT

## 2019-07-10 PROCEDURE — 96365 THER/PROPH/DIAG IV INF INIT: CPT

## 2019-07-10 PROCEDURE — 93005 ELECTROCARDIOGRAM TRACING: CPT

## 2019-10-17 NOTE — H&P ADULT - PROBLEM/PLAN-5
Chief complaint:   Chief Complaint   Patient presents with   • Procedure     IUD removal, bacterial Vaginosis issuses       Vitals:  Visit Vitals  /78 (BP Location: RUE - Right upper extremity, Patient Position: Sitting, Cuff Size: Regular)   Pulse 60   Temp 98 °F (36.7 °C) (Oral)   Ht 5' 9\" (1.753 m)   Wt 62.4 kg   LMP 10/04/2019   SpO2 99%   BMI 20.30 kg/m²       HISTORY OF PRESENT ILLNESS     Patient presents for removal of IUD        Other significant problems:  Patient Active Problem List    Diagnosis Date Noted   • Sustained SVT (AT vs uncommon AVNRT) 12/15/2016     Priority: High     Class: Acute   • Encounter for IUD removal 10/17/2019     Priority: Low   • Pain of cervical facet joint 05/15/2018     Priority: Low   • Acute bilateral low back pain without sciatica 05/15/2018     Priority: Low   • Strain of thoracic region 05/15/2018     Priority: Low   • Sacroiliac joint dysfunction of both sides 05/15/2018     Priority: Low   • History of ITP      Priority: Low   • Cervical radicular pain 10/10/2017     Priority: Low   • Bilateral hand numbness 03/15/2017     Priority: Low   • Atrial fibrillation/ atrial tachycardia, paroxysmal, recurrent 01/17/2017     Priority: Low     On sotalol.    Not on anticoagulation - low CHADS-Vasc score.    ECHO: 12/17/2016 -LVEF 60%, normal LA size.   Cardiac cath: 12/16/2016 - no obstructive CAD, mild LAD stenosis.    CHADS VASc Score    Congestive heart failure: 0 (YES: 1, NO: 0)  Hypertension: 0 (YES: 1, NO: 0)  Age: 0 (<65: 0, 65-74: 1, >75: 2)  Diabetes mellitus: 0 (YES: 1, NO: 0)  Stroke/TIA (transient ischemic attack)/thromboembolism: 0 (YES: 2, NO: 0)  Vascular disease: 0 (YES: 1, NO: 0)  Sex: 1 (Male: 0, Female: 1)    Total Score: 1    CHADSVASC clinical risk estimation. Adapted from Saira et al.   [id number]   SCORE ADJUSTED STROKE RATE (% year)   0 0%   1 1,3%   2 2,2%   3 3,2%   4 4,0%   5 6,7%   6 9,8%   7 9,6%   8 6,7%   9 15,2%          • Sotalol therapy  01/17/2017     Priority: Low     On 120 mg BID.  For AF suppression.  Has headaches with this medication.      • Mild CAD 01/17/2017     Priority: Low     Cardiac cath: 12/2016.      • Coronary vasospasm (CMS/HCC) 01/17/2017     Priority: Low   • Anxiety 01/17/2017     Priority: Low     Avoid SSRI's given risk of QT prolongation.      • Recurrent genital HSV (herpes simplex virus) infection 05/08/2013     Priority: Low       PAST MEDICAL, FAMILY AND SOCIAL HISTORY     Medications:  Current Outpatient Medications   Medication   • ALPRAZolam (XANAX) 0.25 MG tablet   • venlafaxine XR (EFFEXOR XR) 37.5 MG 24 hr capsule   • metroNIDAZOLE (METROGEL-VAGINAL) 0.75 % vaginal gel   • sotalol (BETAPACE) 120 MG tablet   • Ferrous Sulfate (IRON) 325 (65 Fe) MG Tab   • naproxen (ANAPROX) 550 MG tablet   • NON FORMULARY     No current facility-administered medications for this visit.      Facility-Administered Medications Ordered in Other Visits   Medication   • iopamidol (ISOVUE-370) 76 % injection       Allergies:  ALLERGIES:  No Known Allergies    Past Medical  History/Surgeries:  Past Medical History:   Diagnosis Date   • Anxiety 1/17/2017   • HPV in female    • HSV infection    • ITP (idiopathic thrombocytopenic purpura)    • Mild CAD 1/17/2017   • Strain of thoracic region 5/15/2018       History reviewed. No pertinent surgical history.    Family History:  Family History   Problem Relation Age of Onset   • Clotting Disorder Sister         DVT       Social History:  Social History     Tobacco Use   • Smoking status: Never Smoker   • Smokeless tobacco: Never Used   Substance Use Topics   • Alcohol use: Yes     Alcohol/week: 0.0 standard drinks     Frequency: Never     Drinks per session: 1 or 2     Binge frequency: Never     Comment: weekends       REVIEW OF SYSTEMS     Review of Systems   Constitutional: Negative.  Negative for appetite change, chills and fever.   HENT: Negative.  Negative for congestion, ear pain, sinus  pressure, sneezing and sore throat.    Eyes: Negative.  Negative for visual disturbance.   Respiratory: Negative.  Negative for apnea, cough, shortness of breath and wheezing.    Cardiovascular: Negative.    Gastrointestinal: Negative.  Negative for constipation, diarrhea, nausea and vomiting.   Endocrine: Negative.  Negative for cold intolerance and heat intolerance.   Genitourinary: Negative.  Negative for dysuria, genital sores, pelvic pain, vaginal bleeding, vaginal discharge and vaginal pain.   Musculoskeletal: Negative.  Negative for arthralgias, back pain, joint swelling and myalgias.   Skin: Negative.    Allergic/Immunologic: Negative.    Neurological: Negative.  Negative for dizziness, tremors, syncope, weakness, light-headedness and headaches.   Hematological: Negative.  Does not bruise/bleed easily.   Psychiatric/Behavioral: Negative.        PHYSICAL EXAM     Physical Exam  Exam conducted with a chaperone present.   Genitourinary:     Exam position: Lithotomy position.      Comments: Patient was placed in lithotomy position.  Plastic speculum was used in order to visualize the cervix  IUD strings were visualized approx. 2cm out of the external cervical os.  Ring forceps was used to remove the IUD in its entirety  Patient tolerated procedure well        ASSESSMENT/PLAN     Encounter for IUD removal  Patient presented for IUD removal.  Tolerated procedure well  Discussed alternative forms of contraception. Patient states she would like to think about it and discuss with primary. Encouraged to use condoms in the meantime         DISPLAY PLAN FREE TEXT

## 2019-12-02 ENCOUNTER — INPATIENT (INPATIENT)
Facility: HOSPITAL | Age: 59
LOS: 9 days | Discharge: NURSING, REHAB OR LONG-TERM CARE | DRG: 871 | End: 2019-12-12
Attending: INTERNAL MEDICINE | Admitting: HOSPITALIST
Payer: COMMERCIAL

## 2019-12-02 VITALS
OXYGEN SATURATION: 100 % | HEART RATE: 88 BPM | SYSTOLIC BLOOD PRESSURE: 132 MMHG | DIASTOLIC BLOOD PRESSURE: 88 MMHG | TEMPERATURE: 98 F | HEIGHT: 67 IN | WEIGHT: 218.92 LBS | RESPIRATION RATE: 18 BRPM

## 2019-12-02 PROCEDURE — 99285 EMERGENCY DEPT VISIT HI MDM: CPT

## 2019-12-02 PROCEDURE — 93010 ELECTROCARDIOGRAM REPORT: CPT

## 2019-12-02 RX ORDER — IPRATROPIUM/ALBUTEROL SULFATE 18-103MCG
3 AEROSOL WITH ADAPTER (GRAM) INHALATION ONCE
Refills: 0 | Status: COMPLETED | OUTPATIENT
Start: 2019-12-02 | End: 2019-12-02

## 2019-12-02 RX ORDER — AZITHROMYCIN 500 MG/1
500 TABLET, FILM COATED ORAL ONCE
Refills: 0 | Status: COMPLETED | OUTPATIENT
Start: 2019-12-02 | End: 2019-12-02

## 2019-12-02 RX ORDER — MAGNESIUM SULFATE 500 MG/ML
2 VIAL (ML) INJECTION ONCE
Refills: 0 | Status: COMPLETED | OUTPATIENT
Start: 2019-12-02 | End: 2019-12-02

## 2019-12-02 RX ADMIN — Medication 3 MILLILITER(S): at 23:40

## 2019-12-02 RX ADMIN — Medication 50 GRAM(S): at 23:39

## 2019-12-02 RX ADMIN — Medication 125 MILLIGRAM(S): at 23:40

## 2019-12-02 NOTE — ED PROVIDER NOTE - CLINICAL SUMMARY MEDICAL DECISION MAKING FREE TEXT BOX
59 year-old male with history of COPD on 2L NC @ home, pulmonary HTN, CAD and stents, DM II p/w SOB - likely COPD exacerbation.  Unlikely ACS, PE.  Plan for DuoNeb x 3, Mg, steroids and likely TBA given increased oxygen requirements.

## 2019-12-02 NOTE — ED PROVIDER NOTE - PHYSICAL EXAMINATION
*Gen: NAD, AAO*3  *HEENT: NC/AT, dry mucous membranes, airway patent, trachea midline  *CV: RRR, S1/S2 present, no murmurs/rubs  *Resp: + respiratory distress, decreased breath sounds b/l, expiratory wheezing  *Abd: non-distended, soft N/Tx4, no guarding or rigidity  *Neuro: no focal neuro deficits, moving all limbs appropriately  *Extremities: no gross deformity  *Skin: no rashes, no wounds   ~ Kurtis Lee M.D.

## 2019-12-02 NOTE — ED PROVIDER NOTE - OBJECTIVE STATEMENT
59 year-old male with history of COPD on 2L NC @ home, pulmonary HTN, CAD and stents, DM II presents to the Emergency Department for SOB, cough productive of yellow mucus ongoing for the past 3 weeks - progressively worsening.  Reports substernal chest tightness.  No fevers, chills, nausea, vomiting, abdominal pain, LE edema.  Patient does not have a PCP.  Tried to increase home O2 w/o relief hence prompting ER visit.

## 2019-12-02 NOTE — ED PROVIDER NOTE - ATTENDING CONTRIBUTION TO CARE
MD Grimm:  patient seen and evaluated personally.   I agree with the History & Physical,  Impression & Plan other than what was detailed in my note.  MD Grimm    57 y/o hx of copd on 2.5 liters at home presenting w/ increasing sob over past few days associated w/ wheezing, no chest pain. has been taking nebs at home, no recent steroids, no leg swelling, orthopnea, vitalss table, non toxic, decreased bs b/l w/ mild wheezing, neg ponce/velma. ekg shows sig artifact. MD Grimm:  patient seen and evaluated personally.   I agree with the History & Physical,  Impression & Plan other than what was detailed in my note.  MD Grimm    59 y/o hx of copd on 2.5 liters at home presenting w/ increasing sob over past few days associated w/ wheezing, no chest pain. has been taking nebs at home, no recent steroids, no leg swelling, orthopnea, vitalss table, non toxic, decreased bs b/l w/ mild wheezing, neg ponce/velma. ekg shows sig artifact..

## 2019-12-02 NOTE — ED ADULT NURSE NOTE - NSIMPLEMENTINTERV_GEN_ALL_ED
Implemented All Fall Risk Interventions:  [place] to call system. Call bell, personal items and telephone within reach. Instruct patient to call for assistance. Room bathroom lighting operational. Non-slip footwear when patient is off stretcher. Physically safe environment: no spills, clutter or unnecessary equipment. Stretcher in lowest position, wheels locked, appropriate side rails in place. Provide visual cue, wrist band, yellow gown, etc. Monitor gait and stability. Monitor for mental status changes and reorient to person, place, and time. Review medications for side effects contributing to fall risk. Reinforce activity limits and safety measures with patient and family.

## 2019-12-02 NOTE — ED ADULT NURSE NOTE - OBJECTIVE STATEMENT
59 year old A&O BIB EMS from nursing home complaining of SOB, cough with yellow mucus, and midsternal chest tightness x 3 weeks.   PMH COPD on home 2L - 2.5L o2, 94% on nasal cannula, anxiety, DM, schizophrenia, CAD s/p stents, HLD, HTN. Lungs diminished bilaterally with expiratory wheeze noted, in NAD, speaking 6-10 word sentences. 59 year old A&Ox3 BIB EMS from nursing home complaining of SOB, cough with yellow mucus, and midsternal chest tightness x 3 weeks.   PMH COPD on home 2L - 2.5L o2, 94% on nasal cannula, anxiety, DM, schizophrenia, CAD s/p stents, HLD, HTN. Lungs diminished bilaterally with expiratory wheeze noted, in NAD, speaking 6-10 word sentences. Confirms "chest tightness" Denies n/v/d, fevers, chills, abdominal pain, urinary symptoms,  numbness, tingling in upper and lower extremities, HA, blurry vision. Updated on plan of care.

## 2019-12-03 DIAGNOSIS — Z29.9 ENCOUNTER FOR PROPHYLACTIC MEASURES, UNSPECIFIED: ICD-10-CM

## 2019-12-03 DIAGNOSIS — E11.65 TYPE 2 DIABETES MELLITUS WITH HYPERGLYCEMIA: ICD-10-CM

## 2019-12-03 DIAGNOSIS — J44.1 CHRONIC OBSTRUCTIVE PULMONARY DISEASE WITH (ACUTE) EXACERBATION: ICD-10-CM

## 2019-12-03 DIAGNOSIS — K21.9 GASTRO-ESOPHAGEAL REFLUX DISEASE WITHOUT ESOPHAGITIS: ICD-10-CM

## 2019-12-03 DIAGNOSIS — F20.0 PARANOID SCHIZOPHRENIA: ICD-10-CM

## 2019-12-03 DIAGNOSIS — Z95.5 PRESENCE OF CORONARY ANGIOPLASTY IMPLANT AND GRAFT: ICD-10-CM

## 2019-12-03 DIAGNOSIS — Z71.89 OTHER SPECIFIED COUNSELING: ICD-10-CM

## 2019-12-03 DIAGNOSIS — E78.49 OTHER HYPERLIPIDEMIA: ICD-10-CM

## 2019-12-03 DIAGNOSIS — E87.5 HYPERKALEMIA: ICD-10-CM

## 2019-12-03 DIAGNOSIS — Z79.899 OTHER LONG TERM (CURRENT) DRUG THERAPY: ICD-10-CM

## 2019-12-03 DIAGNOSIS — F17.200 NICOTINE DEPENDENCE, UNSPECIFIED, UNCOMPLICATED: ICD-10-CM

## 2019-12-03 LAB
ALBUMIN SERPL ELPH-MCNC: 4.3 G/DL — SIGNIFICANT CHANGE UP (ref 3.3–5)
ALP SERPL-CCNC: 80 U/L — SIGNIFICANT CHANGE UP (ref 40–120)
ALT FLD-CCNC: 21 U/L — SIGNIFICANT CHANGE UP (ref 10–45)
ANION GAP SERPL CALC-SCNC: 13 MMOL/L — SIGNIFICANT CHANGE UP (ref 5–17)
ANION GAP SERPL CALC-SCNC: 14 MMOL/L — SIGNIFICANT CHANGE UP (ref 5–17)
APTT BLD: 30.5 SEC — SIGNIFICANT CHANGE UP (ref 27.5–36.3)
AST SERPL-CCNC: 15 U/L — SIGNIFICANT CHANGE UP (ref 10–40)
BASOPHILS # BLD AUTO: 0.01 K/UL — SIGNIFICANT CHANGE UP (ref 0–0.2)
BASOPHILS # BLD AUTO: 0.02 K/UL — SIGNIFICANT CHANGE UP (ref 0–0.2)
BASOPHILS NFR BLD AUTO: 0.1 % — SIGNIFICANT CHANGE UP (ref 0–2)
BASOPHILS NFR BLD AUTO: 0.3 % — SIGNIFICANT CHANGE UP (ref 0–2)
BILIRUB SERPL-MCNC: 0.1 MG/DL — LOW (ref 0.2–1.2)
BUN SERPL-MCNC: 16 MG/DL — SIGNIFICANT CHANGE UP (ref 7–23)
BUN SERPL-MCNC: 17 MG/DL — SIGNIFICANT CHANGE UP (ref 7–23)
BUN SERPL-MCNC: 20 MG/DL — SIGNIFICANT CHANGE UP (ref 7–23)
BUN SERPL-MCNC: 22 MG/DL — SIGNIFICANT CHANGE UP (ref 7–23)
CALCIUM SERPL-MCNC: 9 MG/DL — SIGNIFICANT CHANGE UP (ref 8.4–10.5)
CALCIUM SERPL-MCNC: 9.1 MG/DL — SIGNIFICANT CHANGE UP (ref 8.4–10.5)
CALCIUM SERPL-MCNC: 9.2 MG/DL — SIGNIFICANT CHANGE UP (ref 8.4–10.5)
CALCIUM SERPL-MCNC: 9.4 MG/DL — SIGNIFICANT CHANGE UP (ref 8.4–10.5)
CHLORIDE SERPL-SCNC: 89 MMOL/L — LOW (ref 96–108)
CHLORIDE SERPL-SCNC: 89 MMOL/L — LOW (ref 96–108)
CHLORIDE SERPL-SCNC: 92 MMOL/L — LOW (ref 96–108)
CHLORIDE SERPL-SCNC: 94 MMOL/L — LOW (ref 96–108)
CO2 SERPL-SCNC: 24 MMOL/L — SIGNIFICANT CHANGE UP (ref 22–31)
CO2 SERPL-SCNC: 24 MMOL/L — SIGNIFICANT CHANGE UP (ref 22–31)
CO2 SERPL-SCNC: 27 MMOL/L — SIGNIFICANT CHANGE UP (ref 22–31)
CO2 SERPL-SCNC: 28 MMOL/L — SIGNIFICANT CHANGE UP (ref 22–31)
CREAT SERPL-MCNC: 0.63 MG/DL — SIGNIFICANT CHANGE UP (ref 0.5–1.3)
CREAT SERPL-MCNC: 0.65 MG/DL — SIGNIFICANT CHANGE UP (ref 0.5–1.3)
CREAT SERPL-MCNC: 0.73 MG/DL — SIGNIFICANT CHANGE UP (ref 0.5–1.3)
CREAT SERPL-MCNC: 0.86 MG/DL — SIGNIFICANT CHANGE UP (ref 0.5–1.3)
EOSINOPHIL # BLD AUTO: 0 K/UL — SIGNIFICANT CHANGE UP (ref 0–0.5)
EOSINOPHIL # BLD AUTO: 0.16 K/UL — SIGNIFICANT CHANGE UP (ref 0–0.5)
EOSINOPHIL NFR BLD AUTO: 0 % — SIGNIFICANT CHANGE UP (ref 0–6)
EOSINOPHIL NFR BLD AUTO: 2.7 % — SIGNIFICANT CHANGE UP (ref 0–6)
GAS PNL BLDV: SIGNIFICANT CHANGE UP
GLUCOSE BLDC GLUCOMTR-MCNC: 302 MG/DL — HIGH (ref 70–99)
GLUCOSE BLDC GLUCOMTR-MCNC: 316 MG/DL — HIGH (ref 70–99)
GLUCOSE BLDC GLUCOMTR-MCNC: 342 MG/DL — HIGH (ref 70–99)
GLUCOSE BLDC GLUCOMTR-MCNC: 455 MG/DL — CRITICAL HIGH (ref 70–99)
GLUCOSE BLDC GLUCOMTR-MCNC: 463 MG/DL — CRITICAL HIGH (ref 70–99)
GLUCOSE SERPL-MCNC: 321 MG/DL — HIGH (ref 70–99)
GLUCOSE SERPL-MCNC: 359 MG/DL — HIGH (ref 70–99)
GLUCOSE SERPL-MCNC: 397 MG/DL — HIGH (ref 70–99)
GLUCOSE SERPL-MCNC: 502 MG/DL — CRITICAL HIGH (ref 70–99)
HCT VFR BLD CALC: 41.1 % — SIGNIFICANT CHANGE UP (ref 39–50)
HCT VFR BLD CALC: 41.6 % — SIGNIFICANT CHANGE UP (ref 39–50)
HGB BLD-MCNC: 13.2 G/DL — SIGNIFICANT CHANGE UP (ref 13–17)
HGB BLD-MCNC: 13.3 G/DL — SIGNIFICANT CHANGE UP (ref 13–17)
IMM GRANULOCYTES NFR BLD AUTO: 0.9 % — SIGNIFICANT CHANGE UP (ref 0–1.5)
IMM GRANULOCYTES NFR BLD AUTO: 1.4 % — SIGNIFICANT CHANGE UP (ref 0–1.5)
INR BLD: 1 RATIO — SIGNIFICANT CHANGE UP (ref 0.88–1.16)
LACTATE BLDV-MCNC: 1.8 MMOL/L — SIGNIFICANT CHANGE UP (ref 0.7–2)
LYMPHOCYTES # BLD AUTO: 0.77 K/UL — LOW (ref 1–3.3)
LYMPHOCYTES # BLD AUTO: 1.5 K/UL — SIGNIFICANT CHANGE UP (ref 1–3.3)
LYMPHOCYTES # BLD AUTO: 11.1 % — LOW (ref 13–44)
LYMPHOCYTES # BLD AUTO: 25.7 % — SIGNIFICANT CHANGE UP (ref 13–44)
MCHC RBC-ENTMCNC: 28.7 PG — SIGNIFICANT CHANGE UP (ref 27–34)
MCHC RBC-ENTMCNC: 28.8 PG — SIGNIFICANT CHANGE UP (ref 27–34)
MCHC RBC-ENTMCNC: 32 GM/DL — SIGNIFICANT CHANGE UP (ref 32–36)
MCHC RBC-ENTMCNC: 32.1 GM/DL — SIGNIFICANT CHANGE UP (ref 32–36)
MCV RBC AUTO: 89.5 FL — SIGNIFICANT CHANGE UP (ref 80–100)
MCV RBC AUTO: 89.8 FL — SIGNIFICANT CHANGE UP (ref 80–100)
MONOCYTES # BLD AUTO: 0.17 K/UL — SIGNIFICANT CHANGE UP (ref 0–0.9)
MONOCYTES # BLD AUTO: 0.91 K/UL — HIGH (ref 0–0.9)
MONOCYTES NFR BLD AUTO: 15.6 % — HIGH (ref 2–14)
MONOCYTES NFR BLD AUTO: 2.5 % — SIGNIFICANT CHANGE UP (ref 2–14)
NEUTROPHILS # BLD AUTO: 3.19 K/UL — SIGNIFICANT CHANGE UP (ref 1.8–7.4)
NEUTROPHILS # BLD AUTO: 5.88 K/UL — SIGNIFICANT CHANGE UP (ref 1.8–7.4)
NEUTROPHILS NFR BLD AUTO: 54.8 % — SIGNIFICANT CHANGE UP (ref 43–77)
NEUTROPHILS NFR BLD AUTO: 84.9 % — HIGH (ref 43–77)
NRBC # BLD: 0 /100 WBCS — SIGNIFICANT CHANGE UP (ref 0–0)
NRBC # BLD: 0 /100 WBCS — SIGNIFICANT CHANGE UP (ref 0–0)
NT-PROBNP SERPL-SCNC: 29 PG/ML — SIGNIFICANT CHANGE UP (ref 0–300)
PLATELET # BLD AUTO: 186 K/UL — SIGNIFICANT CHANGE UP (ref 150–400)
PLATELET # BLD AUTO: 197 K/UL — SIGNIFICANT CHANGE UP (ref 150–400)
POTASSIUM SERPL-MCNC: 5.4 MMOL/L — HIGH (ref 3.5–5.3)
POTASSIUM SERPL-MCNC: 5.4 MMOL/L — HIGH (ref 3.5–5.3)
POTASSIUM SERPL-MCNC: 5.5 MMOL/L — HIGH (ref 3.5–5.3)
POTASSIUM SERPL-MCNC: 6.2 MMOL/L — CRITICAL HIGH (ref 3.5–5.3)
POTASSIUM SERPL-SCNC: 5.4 MMOL/L — HIGH (ref 3.5–5.3)
POTASSIUM SERPL-SCNC: 5.4 MMOL/L — HIGH (ref 3.5–5.3)
POTASSIUM SERPL-SCNC: 5.5 MMOL/L — HIGH (ref 3.5–5.3)
POTASSIUM SERPL-SCNC: 6.2 MMOL/L — CRITICAL HIGH (ref 3.5–5.3)
PROT SERPL-MCNC: 7.4 G/DL — SIGNIFICANT CHANGE UP (ref 6–8.3)
PROTHROM AB SERPL-ACNC: 11.5 SEC — SIGNIFICANT CHANGE UP (ref 10–12.9)
RAPID RVP RESULT: DETECTED
RBC # BLD: 4.59 M/UL — SIGNIFICANT CHANGE UP (ref 4.2–5.8)
RBC # BLD: 4.63 M/UL — SIGNIFICANT CHANGE UP (ref 4.2–5.8)
RBC # FLD: 13.2 % — SIGNIFICANT CHANGE UP (ref 10.3–14.5)
RBC # FLD: 13.4 % — SIGNIFICANT CHANGE UP (ref 10.3–14.5)
RSV RNA SPEC QL NAA+PROBE: DETECTED
SODIUM SERPL-SCNC: 127 MMOL/L — LOW (ref 135–145)
SODIUM SERPL-SCNC: 129 MMOL/L — LOW (ref 135–145)
SODIUM SERPL-SCNC: 131 MMOL/L — LOW (ref 135–145)
SODIUM SERPL-SCNC: 133 MMOL/L — LOW (ref 135–145)
TROPONIN T, HIGH SENSITIVITY RESULT: 15 NG/L — SIGNIFICANT CHANGE UP (ref 0–51)
WBC # BLD: 5.83 K/UL — SIGNIFICANT CHANGE UP (ref 3.8–10.5)
WBC # BLD: 6.93 K/UL — SIGNIFICANT CHANGE UP (ref 3.8–10.5)
WBC # FLD AUTO: 5.83 K/UL — SIGNIFICANT CHANGE UP (ref 3.8–10.5)
WBC # FLD AUTO: 6.93 K/UL — SIGNIFICANT CHANGE UP (ref 3.8–10.5)

## 2019-12-03 PROCEDURE — 71045 X-RAY EXAM CHEST 1 VIEW: CPT | Mod: 26

## 2019-12-03 PROCEDURE — 99223 1ST HOSP IP/OBS HIGH 75: CPT

## 2019-12-03 PROCEDURE — 99497 ADVNCD CARE PLAN 30 MIN: CPT | Mod: 25

## 2019-12-03 PROCEDURE — 71250 CT THORAX DX C-: CPT | Mod: 26

## 2019-12-03 RX ORDER — DEXTROSE 50 % IN WATER 50 %
25 SYRINGE (ML) INTRAVENOUS ONCE
Refills: 0 | Status: DISCONTINUED | OUTPATIENT
Start: 2019-12-03 | End: 2019-12-12

## 2019-12-03 RX ORDER — RISPERIDONE 4 MG/1
0 TABLET ORAL
Qty: 0 | Refills: 0 | DISCHARGE

## 2019-12-03 RX ORDER — BUDESONIDE AND FORMOTEROL FUMARATE DIHYDRATE 160; 4.5 UG/1; UG/1
2 AEROSOL RESPIRATORY (INHALATION)
Refills: 0 | Status: DISCONTINUED | OUTPATIENT
Start: 2019-12-03 | End: 2019-12-12

## 2019-12-03 RX ORDER — DEXTROSE 50 % IN WATER 50 %
12.5 SYRINGE (ML) INTRAVENOUS ONCE
Refills: 0 | Status: DISCONTINUED | OUTPATIENT
Start: 2019-12-03 | End: 2019-12-12

## 2019-12-03 RX ORDER — INSULIN GLARGINE 100 [IU]/ML
66 INJECTION, SOLUTION SUBCUTANEOUS
Refills: 0 | Status: DISCONTINUED | OUTPATIENT
Start: 2019-12-03 | End: 2019-12-04

## 2019-12-03 RX ORDER — SODIUM CHLORIDE 9 MG/ML
500 INJECTION INTRAMUSCULAR; INTRAVENOUS; SUBCUTANEOUS ONCE
Refills: 0 | Status: COMPLETED | OUTPATIENT
Start: 2019-12-03 | End: 2019-12-03

## 2019-12-03 RX ORDER — IPRATROPIUM/ALBUTEROL SULFATE 18-103MCG
3 AEROSOL WITH ADAPTER (GRAM) INHALATION ONCE
Refills: 0 | Status: COMPLETED | OUTPATIENT
Start: 2019-12-03 | End: 2019-12-03

## 2019-12-03 RX ORDER — GLUCAGON INJECTION, SOLUTION 0.5 MG/.1ML
1 INJECTION, SOLUTION SUBCUTANEOUS ONCE
Refills: 0 | Status: DISCONTINUED | OUTPATIENT
Start: 2019-12-03 | End: 2019-12-12

## 2019-12-03 RX ORDER — PANTOPRAZOLE SODIUM 20 MG/1
1 TABLET, DELAYED RELEASE ORAL
Qty: 0 | Refills: 0 | DISCHARGE

## 2019-12-03 RX ORDER — SODIUM ZIRCONIUM CYCLOSILICATE 10 G/10G
10 POWDER, FOR SUSPENSION ORAL
Refills: 0 | Status: COMPLETED | OUTPATIENT
Start: 2019-12-03 | End: 2019-12-04

## 2019-12-03 RX ORDER — LIDOCAINE 4 G/100G
1 CREAM TOPICAL DAILY
Refills: 0 | Status: DISCONTINUED | OUTPATIENT
Start: 2019-12-03 | End: 2019-12-12

## 2019-12-03 RX ORDER — INSULIN LISPRO 100/ML
VIAL (ML) SUBCUTANEOUS AT BEDTIME
Refills: 0 | Status: DISCONTINUED | OUTPATIENT
Start: 2019-12-03 | End: 2019-12-04

## 2019-12-03 RX ORDER — INSULIN LISPRO 100/ML
24 VIAL (ML) SUBCUTANEOUS
Refills: 0 | Status: DISCONTINUED | OUTPATIENT
Start: 2019-12-03 | End: 2019-12-04

## 2019-12-03 RX ORDER — IBUPROFEN 200 MG
1 TABLET ORAL
Qty: 0 | Refills: 0 | DISCHARGE

## 2019-12-03 RX ORDER — IPRATROPIUM/ALBUTEROL SULFATE 18-103MCG
3 AEROSOL WITH ADAPTER (GRAM) INHALATION EVERY 6 HOURS
Refills: 0 | Status: DISCONTINUED | OUTPATIENT
Start: 2019-12-03 | End: 2019-12-12

## 2019-12-03 RX ORDER — FAMOTIDINE 10 MG/ML
20 INJECTION INTRAVENOUS DAILY
Refills: 0 | Status: DISCONTINUED | OUTPATIENT
Start: 2019-12-03 | End: 2019-12-12

## 2019-12-03 RX ORDER — ATORVASTATIN CALCIUM 80 MG/1
40 TABLET, FILM COATED ORAL AT BEDTIME
Refills: 0 | Status: DISCONTINUED | OUTPATIENT
Start: 2019-12-03 | End: 2019-12-12

## 2019-12-03 RX ORDER — INSULIN LISPRO 100/ML
10 VIAL (ML) SUBCUTANEOUS
Qty: 0 | Refills: 0 | DISCHARGE

## 2019-12-03 RX ORDER — SODIUM POLYSTYRENE SULFONATE 4.1 MEQ/G
30 POWDER, FOR SUSPENSION ORAL ONCE
Refills: 0 | Status: DISCONTINUED | OUTPATIENT
Start: 2019-12-03 | End: 2019-12-03

## 2019-12-03 RX ORDER — RISPERIDONE 4 MG/1
0.5 TABLET ORAL THREE TIMES A DAY
Refills: 0 | Status: DISCONTINUED | OUTPATIENT
Start: 2019-12-03 | End: 2019-12-12

## 2019-12-03 RX ORDER — ASPIRIN/CALCIUM CARB/MAGNESIUM 324 MG
81 TABLET ORAL DAILY
Refills: 0 | Status: DISCONTINUED | OUTPATIENT
Start: 2019-12-03 | End: 2019-12-12

## 2019-12-03 RX ORDER — ACETAMINOPHEN 500 MG
1000 TABLET ORAL EVERY 8 HOURS
Refills: 0 | Status: DISCONTINUED | OUTPATIENT
Start: 2019-12-03 | End: 2019-12-08

## 2019-12-03 RX ORDER — INSULIN LISPRO 100/ML
VIAL (ML) SUBCUTANEOUS
Refills: 0 | Status: DISCONTINUED | OUTPATIENT
Start: 2019-12-03 | End: 2019-12-12

## 2019-12-03 RX ORDER — FLUTICASONE PROPIONATE AND SALMETEROL 50; 250 UG/1; UG/1
1 POWDER ORAL; RESPIRATORY (INHALATION)
Qty: 0 | Refills: 0 | DISCHARGE

## 2019-12-03 RX ORDER — CLOPIDOGREL BISULFATE 75 MG/1
75 TABLET, FILM COATED ORAL DAILY
Refills: 0 | Status: DISCONTINUED | OUTPATIENT
Start: 2019-12-03 | End: 2019-12-12

## 2019-12-03 RX ORDER — IBUPROFEN 200 MG
800 TABLET ORAL THREE TIMES A DAY
Refills: 0 | Status: DISCONTINUED | OUTPATIENT
Start: 2019-12-03 | End: 2019-12-08

## 2019-12-03 RX ORDER — DEXTROSE 50 % IN WATER 50 %
15 SYRINGE (ML) INTRAVENOUS ONCE
Refills: 0 | Status: DISCONTINUED | OUTPATIENT
Start: 2019-12-03 | End: 2019-12-12

## 2019-12-03 RX ORDER — SODIUM CHLORIDE 9 MG/ML
1000 INJECTION, SOLUTION INTRAVENOUS
Refills: 0 | Status: DISCONTINUED | OUTPATIENT
Start: 2019-12-03 | End: 2019-12-12

## 2019-12-03 RX ORDER — LANOLIN ALCOHOL/MO/W.PET/CERES
5 CREAM (GRAM) TOPICAL AT BEDTIME
Refills: 0 | Status: DISCONTINUED | OUTPATIENT
Start: 2019-12-03 | End: 2019-12-12

## 2019-12-03 RX ORDER — ENOXAPARIN SODIUM 100 MG/ML
40 INJECTION SUBCUTANEOUS DAILY
Refills: 0 | Status: DISCONTINUED | OUTPATIENT
Start: 2019-12-03 | End: 2019-12-12

## 2019-12-03 RX ADMIN — Medication 1000 MILLIGRAM(S): at 10:15

## 2019-12-03 RX ADMIN — RISPERIDONE 0.5 MILLIGRAM(S): 4 TABLET ORAL at 23:17

## 2019-12-03 RX ADMIN — Medication 1000 MILLIGRAM(S): at 09:14

## 2019-12-03 RX ADMIN — Medication 6: at 09:35

## 2019-12-03 RX ADMIN — Medication 4: at 13:47

## 2019-12-03 RX ADMIN — Medication 2: at 22:35

## 2019-12-03 RX ADMIN — ENOXAPARIN SODIUM 40 MILLIGRAM(S): 100 INJECTION SUBCUTANEOUS at 12:17

## 2019-12-03 RX ADMIN — Medication 24 UNIT(S): at 09:36

## 2019-12-03 RX ADMIN — Medication 3 MILLILITER(S): at 13:49

## 2019-12-03 RX ADMIN — Medication 81 MILLIGRAM(S): at 12:16

## 2019-12-03 RX ADMIN — Medication 50 MILLIGRAM(S): at 09:13

## 2019-12-03 RX ADMIN — Medication 20 MILLIGRAM(S): at 18:05

## 2019-12-03 RX ADMIN — AZITHROMYCIN 250 MILLIGRAM(S): 500 TABLET, FILM COATED ORAL at 00:46

## 2019-12-03 RX ADMIN — Medication 3 MILLILITER(S): at 09:16

## 2019-12-03 RX ADMIN — Medication 4: at 18:57

## 2019-12-03 RX ADMIN — INSULIN GLARGINE 66 UNIT(S): 100 INJECTION, SOLUTION SUBCUTANEOUS at 22:35

## 2019-12-03 RX ADMIN — FAMOTIDINE 20 MILLIGRAM(S): 10 INJECTION INTRAVENOUS at 12:17

## 2019-12-03 RX ADMIN — Medication 1000 MILLIGRAM(S): at 22:47

## 2019-12-03 RX ADMIN — ATORVASTATIN CALCIUM 40 MILLIGRAM(S): 80 TABLET, FILM COATED ORAL at 23:17

## 2019-12-03 RX ADMIN — Medication 3 MILLILITER(S): at 12:16

## 2019-12-03 RX ADMIN — BUDESONIDE AND FORMOTEROL FUMARATE DIHYDRATE 2 PUFF(S): 160; 4.5 AEROSOL RESPIRATORY (INHALATION) at 18:29

## 2019-12-03 RX ADMIN — CLOPIDOGREL BISULFATE 75 MILLIGRAM(S): 75 TABLET, FILM COATED ORAL at 12:17

## 2019-12-03 RX ADMIN — INSULIN GLARGINE 66 UNIT(S): 100 INJECTION, SOLUTION SUBCUTANEOUS at 11:16

## 2019-12-03 RX ADMIN — Medication 3 MILLILITER(S): at 22:12

## 2019-12-03 RX ADMIN — Medication 3 MILLILITER(S): at 18:30

## 2019-12-03 RX ADMIN — SODIUM ZIRCONIUM CYCLOSILICATE 10 GRAM(S): 10 POWDER, FOR SUSPENSION ORAL at 17:58

## 2019-12-03 RX ADMIN — Medication 800 MILLIGRAM(S): at 13:48

## 2019-12-03 RX ADMIN — Medication 24 UNIT(S): at 18:57

## 2019-12-03 RX ADMIN — Medication 24 UNIT(S): at 13:47

## 2019-12-03 RX ADMIN — SODIUM CHLORIDE 500 MILLILITER(S): 9 INJECTION INTRAMUSCULAR; INTRAVENOUS; SUBCUTANEOUS at 00:53

## 2019-12-03 RX ADMIN — RISPERIDONE 0.5 MILLIGRAM(S): 4 TABLET ORAL at 16:22

## 2019-12-03 RX ADMIN — Medication 5 MILLIGRAM(S): at 22:48

## 2019-12-03 NOTE — CONSULT NOTE ADULT - PROBLEM SELECTOR RECOMMENDATION 9
has copd exacerbation secondary to RSV infection: change to IV steoirds and cont BD q 6 hours and add Symbicort 160 mg 2 puffs twice aday

## 2019-12-03 NOTE — H&P ADULT - PROBLEM SELECTOR PLAN 1
likely due to RSV respiratory infection  - will treat with steroid taper, Azithromycin, and Nebs ATC  - O2 supplementation  - smoking cessation discussed

## 2019-12-03 NOTE — H&P ADULT - ADDITIONAL PE
T(F): 97.5 (03 Dec 2019 00:05), Max: 98.4 (02 Dec 2019 23:13)  HR: 88 - 112  BP: 115/60 - 132/88  RR: 18 - 24  SpO2: 94% - 100%

## 2019-12-03 NOTE — CONSULT NOTE ADULT - SUBJECTIVE AND OBJECTIVE BOX
Patient is a 59y old  Male who presents with a chief complaint of SOB (03 Dec 2019 04:54)      HPI:  59 year-old male with history of COPD on 2L NC @ home, pulmonary HTN, CAD and stents, IDDM2, paranoid schizophrenia, anxietyp/w worsening SOB and cough for 1 week from long term NH.  He has been there for the past 8 months and states that he was not getting his nebs ATC and sometimes even PRN, thinks that exacerbated his SOB/COPD.  Active tobacco smoker, 2 cigs/day currently.  Denies fevers, chills, nausea, vomiting, abdominal pain, LE edema or sick contact. Wants Benzo to calm him down and to sleep. (03 Dec 2019 04:54)   he says he has been sob FOR PAST TWO WEEKS AND NOW AND IS NOT ABLE TO SLEEP: He has cough with yellow phlegm : he says he never had fever: he hanot been getting his albuterol at NH:     ?FOLLOWING PRESENT  [x ] Hx of PE/DVT, [y ] Hx COPD, [x Hx of Asthma, [ x] Hx of Hospitalization, [x ]  Hx of BiPAP/CPAP use, [ x] Hx of CYNTHIA    Allergies    No Known Allergies    Intolerances        PAST MEDICAL & SURGICAL HISTORY:  Oxygen dependent  Gastroesophageal reflux disease, esophagitis presence not specified  Smoker  Bipolar 1 disorder  Coronary artery disease involving native coronary artery of native heart without angina pectoris  Type 2 diabetes mellitus without complication, with long-term current use of insulin  Pulmonary HTN  HLD (hyperlipidemia)  Stented coronary artery  COPD (chronic obstructive pulmonary disease)  No significant past surgical history      FAMILY HISTORY:  No family history of mental disorder  No family history of hypertension  No family history of chronic obstructive pulmonary disease  No family history of cardiovascular disease      Social History: [ 30 pk years: still smoking ] TOBACCO                  [  x] ETOH                                 [ x ] IVDA/DRUGS    REVIEW OF SYSTEMS      General:	x    Skin/Breast:x  	  Ophthalmologic:x  	  ENMT:	x    Respiratory and Thorax: sob, coughing, yellow phlegm   	  Cardiovascular:	x    Gastrointestinal:	  x  Genitourinary:	x    Musculoskeletal:	x  x  Neurological:	  x  Psychiatric:	  x  Hematology/Lymphatics:	x    Endocrine:	x    Allergic/Immunologic:	x    MEDICATIONS  (STANDING):  albuterol/ipratropium for Nebulization 3 milliLiter(s) Nebulizer every 6 hours  albuterol/ipratropium for Nebulization. 3 milliLiter(s) Nebulizer once  aspirin enteric coated 81 milliGRAM(s) Oral daily  atorvastatin 40 milliGRAM(s) Oral at bedtime  clopidogrel Tablet 75 milliGRAM(s) Oral daily  dextrose 5%. 1000 milliLiter(s) (50 mL/Hr) IV Continuous <Continuous>  dextrose 50% Injectable 12.5 Gram(s) IV Push once  dextrose 50% Injectable 25 Gram(s) IV Push once  dextrose 50% Injectable 25 Gram(s) IV Push once  enoxaparin Injectable 40 milliGRAM(s) SubCutaneous daily  famotidine    Tablet 20 milliGRAM(s) Oral daily  insulin glargine Injectable (LANTUS) 66 Unit(s) SubCutaneous two times a day  insulin lispro (HumaLOG) corrective regimen sliding scale   SubCutaneous three times a day before meals  insulin lispro (HumaLOG) corrective regimen sliding scale   SubCutaneous at bedtime  insulin lispro Injectable (HumaLOG) 24 Unit(s) SubCutaneous three times a day before meals  melatonin 5 milliGRAM(s) Oral at bedtime  predniSONE   Tablet 50 milliGRAM(s) Oral daily  risperiDONE   Tablet 0.5 milliGRAM(s) Oral three times a day    MEDICATIONS  (PRN):  acetaminophen   Tablet .. 1000 milliGRAM(s) Oral every 8 hours PRN Moderate Pain (4 - 6)  dextrose 40% Gel 15 Gram(s) Oral once PRN Blood Glucose LESS THAN 70 milliGRAM(s)/deciliter  glucagon  Injectable 1 milliGRAM(s) IntraMuscular once PRN Glucose LESS THAN 70 milligrams/deciliter  ibuprofen  Tablet. 800 milliGRAM(s) Oral three times a day PRN Severe Pain (7 - 10)  lidocaine   Patch 1 Patch Transdermal daily PRN back pain       Vital Signs Last 24 Hrs  T(C): 36.5 (03 Dec 2019 08:04), Max: 36.9 (02 Dec 2019 23:13)  T(F): 97.7 (03 Dec 2019 08:04), Max: 98.4 (02 Dec 2019 23:13)  HR: 107 (03 Dec 2019 08:04) (88 - 112)  BP: 123/71 (03 Dec 2019 08:04) (115/60 - 132/88)  BP(mean): 77 (03 Dec 2019 00:05) (77 - 77)  RR: 18 (03 Dec 2019 08:04) (18 - 24)  SpO2: 95% (03 Dec 2019 08:04) (94% - 100%)        I&O's Summary      Physical Exam:   GENERAL: NAD, well-groomed, well-developed  HEENT: GALO/   Atraumatic, Normocephalic  ENMT: No tonsillar erythema, exudates, or enlargement; Moist mucous membranes, Good dentition, No lesions  NECK: Supple, No JVD, Normal thyroid  CHEST/LUNG: wheezing+  CVS: Regular rate and rhythm; No murmurs, rubs, or gallops  GI: : Soft, Nontender, Nondistended; Bowel sounds present  NERVOUS SYSTEM:  Alert & Oriented X3  EXTREMITIES:  -edema  LYMPH: No lymphadenopathy noted  SKIN: No rashes or lesions  ENDOCRINOLOGY: No Thyromegaly  PSYCH: Appropriate    Labs:  Venous<72<4>>61<<7.285>>Venous<<3><<4><<5<<619>>                            13.2   6.93  )-----------( 197      ( 03 Dec 2019 09:37 )             41.1                         13.3   5.83  )-----------( 186      ( 02 Dec 2019 23:50 )             41.6     12-03    127<L>  |  89<L>  |  20  ----------------------------<  502<HH>  6.2<HH>   |  24  |  0.63  12-03    131<L>  |  94<L>  |  17  ----------------------------<  359<H>  5.5<H>   |  24  |  0.73  12-02    133<L>  |  92<L>  |  16  ----------------------------<  321<H>  5.4<H>   |  28  |  0.86    Ca    9.1      03 Dec 2019 09:37  Ca    9.0      03 Dec 2019 02:00  Ca    9.4      02 Dec 2019 23:50    TPro  7.4  /  Alb  4.3  /  TBili  0.1<L>  /  DBili  x   /< from: Xray Chest 1 View AP/PA (12.03.19 @ 00:41) >    PROCEDURE DATE:  12/03/2019            INTERPRETATION:  CLINICAL INDICATION: Shortness of breath.    EXAM: Frontal view of the chest with comparison made to chest radiograph   on 5/7/2019    FINDINGS:   The heart size is normal. Prominent pulmonary artery, which can be seen   in pulmonary arterial hypertension.  The left hemidiaphragm is obscured which may be secondary to a small left   pleural effusion. No focal consolidation is seen.  Thebones are unremarkable.    IMPRESSION:   Questionable small left pleural effusion..Prominent pulmonary artery,   which can be seen in pulmonary arterial hypertension.      < from: CT Angio Chest w/ IV Cont (10.05.18 @ 18:51) >    PROCEDURE DATE:  10/05/2018          INTERPRETATION:  CTA chest .  COMPARISON: None.  CLINICAL INFORMATION: chest pain, dyspnea.  TECHNIQUE: Contiguous axial 1.25 mm slice thickness images ofthe chest   were obtained after intravenous contrast administration utilizing PE   protocol.  Maximum intensity projection,(MIP) 3-D,  imaging was created and   interpreted.  100 mls of Omnipaque 300 was administered intravenously without   complication and 0 mls were discarded.    FINDINGS:  There are no pulmonary arterial filling defects to suggest pulmonary   embolism.    The mediastinum great vessels are normal.     There are no mediastinal masses or lymphadenopathy.     There is mild cardiomegaly with coronary artery calcifications. The   airway is patent showing normal caliber and contour.    There is LEFT lower lobe peripheral segmental airspace consolidation with   mild peribronchial lung lower lobe thickening and mucous plugging. .   RIGHT lung parenchyma and LEFT upper lobe remain clear..    There is no pleural effusion or pneumothorax.    Visualized upper abdominal viscera unremarkable.    The bones are normal.    IMPRESSION:    No evidence of pulmonary embolism.  LEFT lower lobe airspace consolidation concerning for infectious   pneumonia.        < from: TTE Echo Complete w/Doppler (10.04.18 @ 11:54) >  of acute pulmonary embolism.       Summary:   1. Normal left atrial size.   2. Hyperdynamic wall motion.   3. Left ventricular ejection fraction, by visual estimation, is >75%.   Grade I diastolic dysfunction.   4. Severely enlarged right atrium.   5. Severely enlarged right ventricle. Severely reduced RV systolic  function.   6. Moderate tricuspid regurgitation.   7. Estimated pulmonary artery systolic pressure is 56.3 mmHg -moderate   pulmonary hypertension.   8. There is no evidence of pericardial effusion.   9. Findings are suggestive of acute pulmonary embolism.    [id number] Moshe Martínez MD, RPVI, Electronically signed on 12/7/2018 at   4:03:19 PM       < end of copied text >          CHAU YAP M.D., ATTENDING RADIOLOGIST  This document has been electronically signed. Oct  5 2018  7:03PM                < end of copied text >            GARO CALVIN M.D., RADIOLOGY RESIDENT  This document has been electronically signed.  YASMINE WOO M.D., ATTENDING RADIOLOGIST  This document has been electronically signed. Dec  3 2019 10:26AM        < end of copied text >    AST  15  /  ALT  21  /  AlkPhos  80  12-02    CAPILLARY BLOOD GLUCOSE      POCT Blood Glucose.: 455 mg/dL (03 Dec 2019 09:19)  POCT Blood Glucose.: 463 mg/dL (03 Dec 2019 09:18)    LIVER FUNCTIONS - ( 02 Dec 2019 23:50 )  Alb: 4.3 g/dL / Pro: 7.4 g/dL / ALK PHOS: 80 U/L / ALT: 21 U/L / AST: 15 U/L / GGT: x           PT/INR - ( 02 Dec 2019 23:50 )   PT: 11.5 sec;   INR: 1.00 ratio         PTT - ( 02 Dec 2019 23:50 )  PTT:30.5 sec    D DImer  Serum Pro-Brain Natriuretic Peptide: 29 pg/mL (12-02 @ 23:50)      Studies  Chest X-RAY  CT SCAN Chest   CT Abdomen  Venous Dopplers: LE:   Others

## 2019-12-03 NOTE — H&P ADULT - HISTORY OF PRESENT ILLNESS
59 year-old male with history of COPD on 2L NC @ home, pulmonary HTN, CAD and stents, DM II presents to the Emergency Department for SOB, cough productive of yellow mucus ongoing for the past 3 weeks - progressively worsening.  Reports substernal chest tightness.  No fevers, chills, nausea, vomiting, abdominal pain, LE edema.  Patient does not have a PCP.  Tried to increase home O2 w/o relief hence prompting ER visit 59 year-old male with history of COPD on 2L NC @ home, pulmonary HTN, CAD and stents, IDDM2, paranoid schizophrenia, anxietyp/w worsening SOB and cough for 1 week from long term NH.  He has been there for the past 8 months and states that he was not getting his nebs ATC and sometimes even PRN.  Denies fevers, chills, nausea, vomiting, abdominal pain, LE edema or sick contact. 59 year-old male with history of COPD on 2L NC @ home, pulmonary HTN, CAD and stents, IDDM2, paranoid schizophrenia, anxietyp/w worsening SOB and cough for 1 week from long term NH.  He has been there for the past 8 months and states that he was not getting his nebs ATC and sometimes even PRN, thinks that exacerbated his SOB/COPD.  Active tobacco smoker, 2 cigs/day currently.  Denies fevers, chills, nausea, vomiting, abdominal pain, LE edema or sick contact. Wants Benzo to calm him down and to sleep.

## 2019-12-03 NOTE — CONSULT NOTE ADULT - ASSESSMENT
59 year-old male with history of COPD on 2L NC @ home, pulmonary HTN, CAD and stents, IDDM2, paranoid schizophrenia, anxietyp/w worsening SOB and cough for 1 week from long term NH a/w acute COPD exacerbation due to RSV respiratory infection.

## 2019-12-03 NOTE — CONSULT NOTE ADULT - ASSESSMENT
Patient is a 59y old  Male with COPD on 2L NC @ home, pulmonary HTN, CAD and stents, IDDM2, paranoid schizophrenia, anxiety,  now presents to the ER for evaluation of worsening SOB and cough for 1 week from long term NH.  He has been there for the past 8 months and states that he was not getting his nebs ATC and sometimes even PRN, thinks that exacerbated his SOB/COPD.  Active tobacco smoker, 2 cigs/day currently.  He has no  fever or chills,. ON arrival, he was tachycardic and tachypneic. The RVP detected RSV. The ID consult requested to assist with further evaluation and antibiotic management.     # Viral Pneumonia- RSV  # COPD Exacerbation    would recommend:    1. Supportive care for RSV, namely supplemental oxygenation and bronchodilator as needed  2. Isolation as per Infection Control Dept. policy   3. Management of COPD exacerbation as per PUlmonary   4. OOB to chair     will follow the patient with you and make further recommendation based on the clinical course and Lab results  Thank you for the opportunity to participate in Mr. CARBALLO's care

## 2019-12-03 NOTE — H&P ADULT - PROBLEM SELECTOR PLAN 9
Medication list from NH reviewed and reconciled.  Recently completed Bactrim for a "broil" in R groin which is now just a scab.

## 2019-12-03 NOTE — H&P ADULT - PROBLEM SELECTOR PLAN 2
uncontrolled, hyperglycemia likely due to infection and steroid induced  - s/p 500cc NS in ED, drinking well overnight with u/o ~500ccc  - will treat with current insulin regimen (lantus 66 u bid, premeal lispro 24u tid) for now with ISS and will adjust accordingly  - Hyponatremia 130's likely due to hyperglycemia, will monitor  - Endo eval to call in am

## 2019-12-03 NOTE — H&P ADULT - PROBLEM SELECTOR PLAN 3
- will treat with ASA, Plavix, statin - will treat with fluid, insulin (hyperglycemia) and Nebs ATC  - bladder scan to r/o obstruction  - will repeat K in am

## 2019-12-03 NOTE — H&P ADULT - PROBLEM SELECTOR PLAN 10
Documented in NH documentation as "active" MOLTS in place with DNR/DNI.  I personally spent 20 min face-to-face time discussing advance directives including but not limited to DNR/DNI, and plan of care with patient.  Patient states he is FULL CODE and his HCP is sister (Chiquita Stanley [phone]).

## 2019-12-03 NOTE — H&P ADULT - ASSESSMENT
59 year-old male with history of COPD on 2L NC @ home, pulmonary HTN, CAD and stents, DM II 59 year-old male with history of COPD on 2L NC @ home, pulmonary HTN, CAD and stents, IDDM2, paranoid schizophrenia, anxietyp/w worsening SOB and cough for 1 week from long term NH a/w acute COPD exacerbation due to RSV respiratory infection.

## 2019-12-03 NOTE — H&P ADULT - PROBLEM SELECTOR PLAN 7
smoking cessation advised, patient is cutting down, now 2-3 cigs/day, no prn inhaler needed per patient

## 2019-12-03 NOTE — H&P ADULT - NSHPSOCIALHISTORY_GEN_ALL_CORE
Lives with Lives at [facility], used to work as a mechanics, active smoker (2-3 cigs/d now, used to be 2 ppd x30 yrs), no alcohol

## 2019-12-03 NOTE — CONSULT NOTE ADULT - SUBJECTIVE AND OBJECTIVE BOX
[facility]   MD Chelsea Burroughs D.O. Fatima Sheikh, D.O. Ruoru Wong, PA    From 7 AM - 5 PM:  OFFICE: [phone]  Dr. Juarez cell: [phone]  Dr. Daniel cell: [phone]  Dr. Kwong cell: [phone]  ALVARO Spencer cell: [phone]    From 5 PM - 7 AM: Answering Service: [phone]      -- INITIAL RENAL CONSULT NOTE  --------------------------------------------------------------------------------  HPI: 59 year-old male with history of COPD on 2L NC @ home, pulmonary HTN, CAD and stents, IDDM2, paranoid schizophrenia, anxietyp/w worsening SOB and cough for 1 week from long term NH.  He has been there for the past 8 months and states that he was not getting his nebs ATC and sometimes even PRN, thinks that exacerbated his SOB/COPD. Nephrology consulted for electrolyte abnormality. Pt seen and examined in ER. Admits to significant water intake. Recent steriods for SOB.     PAST HISTORY  --------------------------------------------------------------------------------  PAST MEDICAL & SURGICAL HISTORY:  Oxygen dependent  Gastroesophageal reflux disease, esophagitis presence not specified  Smoker  Bipolar 1 disorder  Coronary artery disease involving native coronary artery of native heart without angina pectoris  Type 2 diabetes mellitus without complication, with long-term current use of insulin  Pulmonary HTN  HLD (hyperlipidemia)  Stented coronary artery  COPD (chronic obstructive pulmonary disease)  No significant past surgical history    FAMILY HISTORY:  No family history of mental disorder  No family history of hypertension  No family history of chronic obstructive pulmonary disease  No family history of cardiovascular disease    PAST SOCIAL HISTORY:    ALLERGIES & MEDICATIONS  --------------------------------------------------------------------------------  Allergies    No Known Allergies    Intolerances      Standing Inpatient Medications  albuterol/ipratropium for Nebulization 3 milliLiter(s) Nebulizer every 6 hours  albuterol/ipratropium for Nebulization. 3 milliLiter(s) Nebulizer once  aspirin enteric coated 81 milliGRAM(s) Oral daily  atorvastatin 40 milliGRAM(s) Oral at bedtime  clopidogrel Tablet 75 milliGRAM(s) Oral daily  dextrose 5%. 1000 milliLiter(s) IV Continuous <Continuous>  dextrose 50% Injectable 12.5 Gram(s) IV Push once  dextrose 50% Injectable 25 Gram(s) IV Push once  dextrose 50% Injectable 25 Gram(s) IV Push once  enoxaparin Injectable 40 milliGRAM(s) SubCutaneous daily  famotidine    Tablet 20 milliGRAM(s) Oral daily  insulin glargine Injectable (LANTUS) 66 Unit(s) SubCutaneous two times a day  insulin lispro (HumaLOG) corrective regimen sliding scale   SubCutaneous three times a day before meals  insulin lispro (HumaLOG) corrective regimen sliding scale   SubCutaneous at bedtime  insulin lispro Injectable (HumaLOG) 24 Unit(s) SubCutaneous three times a day before meals  melatonin 5 milliGRAM(s) Oral at bedtime  predniSONE   Tablet 50 milliGRAM(s) Oral daily  risperiDONE   Tablet 0.5 milliGRAM(s) Oral three times a day    PRN Inpatient Medications  acetaminophen   Tablet .. 1000 milliGRAM(s) Oral every 8 hours PRN  dextrose 40% Gel 15 Gram(s) Oral once PRN  glucagon  Injectable 1 milliGRAM(s) IntraMuscular once PRN  ibuprofen  Tablet. 800 milliGRAM(s) Oral three times a day PRN  lidocaine   Patch 1 Patch Transdermal daily PRN      REVIEW OF SYSTEMS  --------------------------------------------------------------------------------  Gen: No fevers/chills  Skin: No rashes  Head/Eyes/Ears: Normal hearing,  Normal vision   Respiratory: No dyspnea, cough  CV: No chest pain  GI: No abdominal pain, diarrhea, constipation, nausea, vomiting  : No dysuria, hematuria  MSK: No  edema  Heme: No easy bruising or bleeding  Psych: No significant depression    All other systems were reviewed and are negative, except as noted.    VITALS/PHYSICAL EXAM  --------------------------------------------------------------------------------  T(C): 36.5 (12-03-19 @ 08:04), Max: 36.9 (12-02-19 @ 23:13)  HR: 107 (12-03-19 @ 08:04) (88 - 112)  BP: 123/71 (12-03-19 @ 08:04) (115/60 - 132/88)  RR: 18 (12-03-19 @ 08:04) (18 - 24)  SpO2: 95% (12-03-19 @ 08:04) (94% - 100%)  Wt(kg): --  Height (cm): 170.18 (12-02-19 @ 23:13)  Weight (kg): 99.3 (12-02-19 @ 23:13)  BMI (kg/m2): 34.3 (12-02-19 @ 23:13)  BSA (m2): 2.1 (12-02-19 @ 23:13)      Physical Exam:  	Gen: NAD  	HEENT: MMM  	Pulm: scatter wheeze B/L  	CV: S1S2  	Abd: Soft, +BS   	Ext: No LE edema B/L  	Neuro: Awake  	Skin: Warm and dry      LABS/STUDIES  --------------------------------------------------------------------------------              13.2   6.93  >-----------<  197      [12-03-19 @ 09:37]              41.1     127  |  89  |  20  ----------------------------<  502      [12-03-19 @ 09:37]  6.2   |  24  |  0.63        Ca     9.1     [12-03-19 @ 09:37]    TPro  7.4  /  Alb  4.3  /  TBili  0.1  /  DBili  x   /  AST  15  /  ALT  21  /  AlkPhos  80  [12-02-19 @ 23:50]    PT/INR: PT 11.5 , INR 1.00       [12-02-19 @ 23:50]  PTT: 30.5       [12-02-19 @ 23:50]      Creatinine Trend:  SCr 0.63 [12-03 @ 09:37]  SCr 0.73 [12-03 @ 02:00]  SCr 0.86 [12-02 @ 23:50]    Urinalysis - [10-19-18 @ 08:02]      Color Yellow / Appearance Clear / SG 1.010 / pH 7.0      Gluc 1000 / Ketone Negative  / Bili Negative / Urobili Negative       Blood Small / Protein 15 / Leuk Est Small / Nitrite Positive      RBC 0-2 / WBC 11-25 / Hyaline  / Gran  / Sq Epi  / Non Sq Epi  / Bacteria Many      HbA1c 9.7      [05-11-19 @ 10:24]    HCV 0.14, Nonreact      [05-10-19 @ 20:13]

## 2019-12-04 DIAGNOSIS — E66.9 OBESITY, UNSPECIFIED: ICD-10-CM

## 2019-12-04 DIAGNOSIS — E78.5 HYPERLIPIDEMIA, UNSPECIFIED: ICD-10-CM

## 2019-12-04 DIAGNOSIS — J06.9 ACUTE UPPER RESPIRATORY INFECTION, UNSPECIFIED: ICD-10-CM

## 2019-12-04 DIAGNOSIS — I10 ESSENTIAL (PRIMARY) HYPERTENSION: ICD-10-CM

## 2019-12-04 LAB
ANION GAP SERPL CALC-SCNC: 13 MMOL/L — SIGNIFICANT CHANGE UP (ref 5–17)
BUN SERPL-MCNC: 21 MG/DL — SIGNIFICANT CHANGE UP (ref 7–23)
CALCIUM SERPL-MCNC: 9.1 MG/DL — SIGNIFICANT CHANGE UP (ref 8.4–10.5)
CHLORIDE SERPL-SCNC: 91 MMOL/L — LOW (ref 96–108)
CO2 SERPL-SCNC: 30 MMOL/L — SIGNIFICANT CHANGE UP (ref 22–31)
CORTIS AM PEAK SERPL-MCNC: 2.6 UG/DL — LOW (ref 6–18.4)
CREAT SERPL-MCNC: 0.62 MG/DL — SIGNIFICANT CHANGE UP (ref 0.5–1.3)
GLUCOSE BLDC GLUCOMTR-MCNC: 260 MG/DL — HIGH (ref 70–99)
GLUCOSE BLDC GLUCOMTR-MCNC: 335 MG/DL — HIGH (ref 70–99)
GLUCOSE BLDC GLUCOMTR-MCNC: 351 MG/DL — HIGH (ref 70–99)
GLUCOSE BLDC GLUCOMTR-MCNC: 356 MG/DL — HIGH (ref 70–99)
GLUCOSE SERPL-MCNC: 416 MG/DL — HIGH (ref 70–99)
HBA1C BLD-MCNC: 10.4 % — HIGH (ref 4–5.6)
HCT VFR BLD CALC: 37.9 % — LOW (ref 39–50)
HGB BLD-MCNC: 11.9 G/DL — LOW (ref 13–17)
MAGNESIUM SERPL-MCNC: 2.3 MG/DL — SIGNIFICANT CHANGE UP (ref 1.6–2.6)
MCHC RBC-ENTMCNC: 28.5 PG — SIGNIFICANT CHANGE UP (ref 27–34)
MCHC RBC-ENTMCNC: 31.4 GM/DL — LOW (ref 32–36)
MCV RBC AUTO: 90.9 FL — SIGNIFICANT CHANGE UP (ref 80–100)
NRBC # BLD: 0 /100 WBCS — SIGNIFICANT CHANGE UP (ref 0–0)
OSMOLALITY SERPL: 303 MOSMOL/KG — HIGH (ref 275–300)
OSMOLALITY UR: 489 MOS/KG — SIGNIFICANT CHANGE UP (ref 300–900)
PLATELET # BLD AUTO: 221 K/UL — SIGNIFICANT CHANGE UP (ref 150–400)
POTASSIUM SERPL-MCNC: 5.3 MMOL/L — SIGNIFICANT CHANGE UP (ref 3.5–5.3)
POTASSIUM SERPL-SCNC: 5.3 MMOL/L — SIGNIFICANT CHANGE UP (ref 3.5–5.3)
PROCALCITONIN SERPL-MCNC: 0.08 NG/ML — SIGNIFICANT CHANGE UP (ref 0.02–0.1)
RBC # BLD: 4.17 M/UL — LOW (ref 4.2–5.8)
RBC # FLD: 13.2 % — SIGNIFICANT CHANGE UP (ref 10.3–14.5)
SODIUM SERPL-SCNC: 134 MMOL/L — LOW (ref 135–145)
SODIUM UR-SCNC: <35 MMOL/L — SIGNIFICANT CHANGE UP
WBC # BLD: 6.73 K/UL — SIGNIFICANT CHANGE UP (ref 3.8–10.5)
WBC # FLD AUTO: 6.73 K/UL — SIGNIFICANT CHANGE UP (ref 3.8–10.5)

## 2019-12-04 RX ORDER — INSULIN LISPRO 100/ML
30 VIAL (ML) SUBCUTANEOUS ONCE
Refills: 0 | Status: COMPLETED | OUTPATIENT
Start: 2019-12-04 | End: 2019-12-04

## 2019-12-04 RX ORDER — CHLORHEXIDINE GLUCONATE 213 G/1000ML
1 SOLUTION TOPICAL
Refills: 0 | Status: DISCONTINUED | OUTPATIENT
Start: 2019-12-04 | End: 2019-12-09

## 2019-12-04 RX ORDER — CHLORHEXIDINE GLUCONATE 213 G/1000ML
1 SOLUTION TOPICAL
Refills: 0 | Status: DISCONTINUED | OUTPATIENT
Start: 2019-12-04 | End: 2019-12-04

## 2019-12-04 RX ORDER — INSULIN LISPRO 100/ML
30 VIAL (ML) SUBCUTANEOUS
Refills: 0 | Status: DISCONTINUED | OUTPATIENT
Start: 2019-12-04 | End: 2019-12-05

## 2019-12-04 RX ORDER — INSULIN LISPRO 100/ML
27 VIAL (ML) SUBCUTANEOUS
Refills: 0 | Status: DISCONTINUED | OUTPATIENT
Start: 2019-12-04 | End: 2019-12-04

## 2019-12-04 RX ORDER — SODIUM ZIRCONIUM CYCLOSILICATE 10 G/10G
10 POWDER, FOR SUSPENSION ORAL DAILY
Refills: 0 | Status: COMPLETED | OUTPATIENT
Start: 2019-12-04 | End: 2019-12-04

## 2019-12-04 RX ORDER — INSULIN GLARGINE 100 [IU]/ML
78 INJECTION, SOLUTION SUBCUTANEOUS ONCE
Refills: 0 | Status: DISCONTINUED | OUTPATIENT
Start: 2019-12-04 | End: 2019-12-04

## 2019-12-04 RX ORDER — SODIUM CHLORIDE 9 MG/ML
1000 INJECTION INTRAMUSCULAR; INTRAVENOUS; SUBCUTANEOUS
Refills: 0 | Status: DISCONTINUED | OUTPATIENT
Start: 2019-12-04 | End: 2019-12-08

## 2019-12-04 RX ORDER — INSULIN GLARGINE 100 [IU]/ML
65 INJECTION, SOLUTION SUBCUTANEOUS AT BEDTIME
Refills: 0 | Status: DISCONTINUED | OUTPATIENT
Start: 2019-12-04 | End: 2019-12-05

## 2019-12-04 RX ORDER — NICOTINE POLACRILEX 2 MG
1 GUM BUCCAL DAILY
Refills: 0 | Status: DISCONTINUED | OUTPATIENT
Start: 2019-12-04 | End: 2019-12-08

## 2019-12-04 RX ORDER — ACETAMINOPHEN 500 MG
1000 TABLET ORAL ONCE
Refills: 0 | Status: COMPLETED | OUTPATIENT
Start: 2019-12-04 | End: 2019-12-04

## 2019-12-04 RX ORDER — SODIUM ZIRCONIUM CYCLOSILICATE 10 G/10G
10 POWDER, FOR SUSPENSION ORAL ONCE
Refills: 0 | Status: DISCONTINUED | OUTPATIENT
Start: 2019-12-04 | End: 2019-12-04

## 2019-12-04 RX ORDER — AZITHROMYCIN 500 MG/1
250 TABLET, FILM COATED ORAL DAILY
Refills: 0 | Status: COMPLETED | OUTPATIENT
Start: 2019-12-04 | End: 2019-12-07

## 2019-12-04 RX ORDER — INSULIN GLARGINE 100 [IU]/ML
78 INJECTION, SOLUTION SUBCUTANEOUS EVERY MORNING
Refills: 0 | Status: DISCONTINUED | OUTPATIENT
Start: 2019-12-05 | End: 2019-12-05

## 2019-12-04 RX ORDER — OXYCODONE AND ACETAMINOPHEN 5; 325 MG/1; MG/1
1 TABLET ORAL ONCE
Refills: 0 | Status: DISCONTINUED | OUTPATIENT
Start: 2019-12-04 | End: 2019-12-04

## 2019-12-04 RX ADMIN — Medication 1000 MILLIGRAM(S): at 20:00

## 2019-12-04 RX ADMIN — Medication 20 MILLIGRAM(S): at 06:41

## 2019-12-04 RX ADMIN — Medication 400 MILLIGRAM(S): at 19:59

## 2019-12-04 RX ADMIN — Medication 20 MILLIGRAM(S): at 21:43

## 2019-12-04 RX ADMIN — Medication 81 MILLIGRAM(S): at 11:56

## 2019-12-04 RX ADMIN — ATORVASTATIN CALCIUM 40 MILLIGRAM(S): 80 TABLET, FILM COATED ORAL at 21:43

## 2019-12-04 RX ADMIN — Medication 1 PATCH: at 12:11

## 2019-12-04 RX ADMIN — Medication 3 MILLILITER(S): at 17:35

## 2019-12-04 RX ADMIN — BUDESONIDE AND FORMOTEROL FUMARATE DIHYDRATE 2 PUFF(S): 160; 4.5 AEROSOL RESPIRATORY (INHALATION) at 05:40

## 2019-12-04 RX ADMIN — Medication 3 MILLILITER(S): at 00:01

## 2019-12-04 RX ADMIN — RISPERIDONE 0.5 MILLIGRAM(S): 4 TABLET ORAL at 06:41

## 2019-12-04 RX ADMIN — Medication 30 UNIT(S): at 12:30

## 2019-12-04 RX ADMIN — AZITHROMYCIN 250 MILLIGRAM(S): 500 TABLET, FILM COATED ORAL at 12:13

## 2019-12-04 RX ADMIN — Medication 4: at 12:39

## 2019-12-04 RX ADMIN — Medication 20 MILLIGRAM(S): at 17:55

## 2019-12-04 RX ADMIN — Medication 20 MILLIGRAM(S): at 10:10

## 2019-12-04 RX ADMIN — CHLORHEXIDINE GLUCONATE 1 APPLICATION(S): 213 SOLUTION TOPICAL at 10:15

## 2019-12-04 RX ADMIN — CLOPIDOGREL BISULFATE 75 MILLIGRAM(S): 75 TABLET, FILM COATED ORAL at 12:12

## 2019-12-04 RX ADMIN — RISPERIDONE 0.5 MILLIGRAM(S): 4 TABLET ORAL at 15:57

## 2019-12-04 RX ADMIN — Medication 24 UNIT(S): at 08:39

## 2019-12-04 RX ADMIN — FAMOTIDINE 20 MILLIGRAM(S): 10 INJECTION INTRAVENOUS at 12:12

## 2019-12-04 RX ADMIN — Medication 3 MILLILITER(S): at 05:40

## 2019-12-04 RX ADMIN — BUDESONIDE AND FORMOTEROL FUMARATE DIHYDRATE 2 PUFF(S): 160; 4.5 AEROSOL RESPIRATORY (INHALATION) at 17:36

## 2019-12-04 RX ADMIN — Medication 3 MILLILITER(S): at 12:01

## 2019-12-04 RX ADMIN — OXYCODONE AND ACETAMINOPHEN 2 TABLET(S): 5; 325 TABLET ORAL at 00:30

## 2019-12-04 RX ADMIN — Medication 5: at 08:40

## 2019-12-04 RX ADMIN — RISPERIDONE 0.5 MILLIGRAM(S): 4 TABLET ORAL at 21:43

## 2019-12-04 RX ADMIN — Medication 3: at 17:53

## 2019-12-04 RX ADMIN — SODIUM ZIRCONIUM CYCLOSILICATE 10 GRAM(S): 10 POWDER, FOR SUSPENSION ORAL at 12:41

## 2019-12-04 RX ADMIN — SODIUM CHLORIDE 60 MILLILITER(S): 9 INJECTION INTRAMUSCULAR; INTRAVENOUS; SUBCUTANEOUS at 16:00

## 2019-12-04 RX ADMIN — SODIUM ZIRCONIUM CYCLOSILICATE 10 GRAM(S): 10 POWDER, FOR SUSPENSION ORAL at 06:41

## 2019-12-04 RX ADMIN — Medication 3 MILLILITER(S): at 23:20

## 2019-12-04 RX ADMIN — INSULIN GLARGINE 65 UNIT(S): 100 INJECTION, SOLUTION SUBCUTANEOUS at 22:34

## 2019-12-04 RX ADMIN — ENOXAPARIN SODIUM 40 MILLIGRAM(S): 100 INJECTION SUBCUTANEOUS at 12:11

## 2019-12-04 RX ADMIN — Medication 1 PATCH: at 19:30

## 2019-12-04 RX ADMIN — INSULIN GLARGINE 66 UNIT(S): 100 INJECTION, SOLUTION SUBCUTANEOUS at 09:02

## 2019-12-04 RX ADMIN — Medication 30 UNIT(S): at 17:54

## 2019-12-04 RX ADMIN — Medication 800 MILLIGRAM(S): at 19:05

## 2019-12-04 RX ADMIN — Medication 5 MILLIGRAM(S): at 21:43

## 2019-12-04 NOTE — PROGRESS NOTE ADULT - SUBJECTIVE AND OBJECTIVE BOX
Patient seen and examined at bedside  c/o persistent yet slightly improving sob and erickson  Case discussed with medical team    HPI:  59 year-old male with history of COPD on 2L NC @ home, pulmonary HTN, CAD and stents, IDDM2, paranoid schizophrenia, anxietyp/w worsening SOB and cough for 1 week from long term NH.  He has been there for the past 8 months and states that he was not getting his nebs ATC and sometimes even PRN, thinks that exacerbated his SOB/COPD.  Active tobacco smoker, 2 cigs/day currently.  Denies fevers, chills, nausea, vomiting, abdominal pain, LE edema or sick contact. Wants Benzo to calm him down and to sleep. (03 Dec 2019 04:54)      PAST MEDICAL & SURGICAL HISTORY:  Oxygen dependent  Gastroesophageal reflux disease, esophagitis presence not specified  Smoker  Bipolar 1 disorder  Coronary artery disease involving native coronary artery of native heart without angina pectoris  Type 2 diabetes mellitus without complication, with long-term current use of insulin  Pulmonary HTN  HLD (hyperlipidemia)  Stented coronary artery  COPD (chronic obstructive pulmonary disease)  No significant past surgical history      No Known Allergies       MEDICATIONS  (STANDING):  albuterol/ipratropium for Nebulization 3 milliLiter(s) Nebulizer every 6 hours  aspirin enteric coated 81 milliGRAM(s) Oral daily  atorvastatin 40 milliGRAM(s) Oral at bedtime  azithromycin   Tablet 250 milliGRAM(s) Oral daily  budesonide 160 MICROgram(s)/formoterol 4.5 MICROgram(s) Inhaler 2 Puff(s) Inhalation two times a day  clopidogrel Tablet 75 milliGRAM(s) Oral daily  dextrose 5%. 1000 milliLiter(s) (50 mL/Hr) IV Continuous <Continuous>  dextrose 50% Injectable 12.5 Gram(s) IV Push once  dextrose 50% Injectable 25 Gram(s) IV Push once  dextrose 50% Injectable 25 Gram(s) IV Push once  enoxaparin Injectable 40 milliGRAM(s) SubCutaneous daily  famotidine    Tablet 20 milliGRAM(s) Oral daily  insulin glargine Injectable (LANTUS) 66 Unit(s) SubCutaneous two times a day  insulin lispro (HumaLOG) corrective regimen sliding scale   SubCutaneous three times a day before meals  insulin lispro (HumaLOG) corrective regimen sliding scale   SubCutaneous at bedtime  insulin lispro Injectable (HumaLOG) 27 Unit(s) SubCutaneous three times a day before meals  melatonin 5 milliGRAM(s) Oral at bedtime  methylPREDNISolone sodium succinate Injectable 20 milliGRAM(s) IV Push every 8 hours  risperiDONE   Tablet 0.5 milliGRAM(s) Oral three times a day  sodium chloride 0.9%. 1000 milliLiter(s) (60 mL/Hr) IV Continuous <Continuous>  sodium zirconium cyclosilicate 10 Gram(s) Oral daily    MEDICATIONS  (PRN):  acetaminophen   Tablet .. 1000 milliGRAM(s) Oral every 8 hours PRN Moderate Pain (4 - 6)  dextrose 40% Gel 15 Gram(s) Oral once PRN Blood Glucose LESS THAN 70 milliGRAM(s)/deciliter  glucagon  Injectable 1 milliGRAM(s) IntraMuscular once PRN Glucose LESS THAN 70 milligrams/deciliter  ibuprofen  Tablet. 800 milliGRAM(s) Oral three times a day PRN Severe Pain (7 - 10)  lidocaine   Patch 1 Patch Transdermal daily PRN back pain      REVIEW OF SYSTEMS:  CONSTITUTIONAL: (+) malaise. fatigue.   EYES: No acute change in vision   ENT:  No tinnitus  NECK: No stiffness  RESPIRATORY: sob, erickson. No hemoptysis  CARDIOVASCULAR: No chest pain, palpitations, syncope  GASTROINTESTINAL: No hematemesis, diarrhea, melena, or hematochezia.  GENITOURINARY: No hematuria  NEUROLOGICAL: No headaches  LYMPH Nodes: No enlarged glands  ENDOCRINE: No heat or cold intolerance	    T(C): 36.4 (12-04-19 @ 05:18), Max: 36.4 (12-04-19 @ 05:18)  HR: 88 (12-04-19 @ 05:40) (87 - 105)  BP: 97/58 (12-04-19 @ 05:18) (97/58 - 115/74)  RR: 20 (12-04-19 @ 05:18) (18 - 20)  SpO2: 99% (12-04-19 @ 05:40) (94% - 99%)    PHYSICAL EXAMINATION:   Constitutional: mild ill appearance. NAD  HEENT: NC, AT  Neck:  Supple  Respiratory:  mild tachypnea with exertion/speaking. Adequate airflow b/l. Not using accessory muscles of respiration.  Cardiovascular: sys murmur. S1 & S2 intact, no R/G, 2+ radial pulses b/l  Gastrointestinal: Soft, NT, ND, normoactive b.s., no organomegaly/RT/rigidity  Extremities: WWP  Neurological:  Alert and awake.  No acute focal motor deficits. Crude sensation intact.     Labs and imaging reviewed    LABS:                        13.2   6.93  )-----------( 197      ( 03 Dec 2019 09:37 )             41.1     12-03    129<L>  |  89<L>  |  22  ----------------------------<  397<H>  5.4<H>   |  27  |  0.65    Ca    9.2      03 Dec 2019 12:37    TPro  7.4  /  Alb  4.3  /  TBili  0.1<L>  /  DBili  x   /  AST  15  /  ALT  21  /  AlkPhos  80  12-02        PT/INR - ( 02 Dec 2019 23:50 )   PT: 11.5 sec;   INR: 1.00 ratio         PTT - ( 02 Dec 2019 23:50 )  PTT:30.5 sec    CAPILLARY BLOOD GLUCOSE      POCT Blood Glucose.: 356 mg/dL (04 Dec 2019 08:28)  POCT Blood Glucose.: 302 mg/dL (03 Dec 2019 21:57)  POCT Blood Glucose.: 342 mg/dL (03 Dec 2019 18:44)  POCT Blood Glucose.: 316 mg/dL (03 Dec 2019 13:12)        LIVER FUNCTIONS - ( 02 Dec 2019 23:50 )  Alb: 4.3 g/dL / Pro: 7.4 g/dL / ALK PHOS: 80 U/L / ALT: 21 U/L / AST: 15 U/L / GGT: x               RADIOLOGY & ADDITIONAL STUDIES:

## 2019-12-04 NOTE — PROGRESS NOTE ADULT - ASSESSMENT
59 year-old male with history of COPD on 2L NC @ home, pulmonary HTN, CAD and stents, IDDM2, paranoid schizophrenia, anxietyp/w worsening SOB and cough for 1 week from long term NH.  He has been there for the past 8 months and states that he was not getting his nebs ATC and sometimes even PRN, thinks that exacerbated his SOB/COPD. Nephrology consulted for electrolyte abnormality.    Hyponatremia  - in the setting of elevated glucose  - Check urine osm, urine na  - check serum osm   - Improved slightly with IVF  - Advise to continue IVF today given persistent hyperglycemia   - optimized DM control   - Avoid over correction of 8- 10 meq in 24 hours   - Recheck BMP in 8 hours     Hyperkalemia   Pt with elevated serum potassium  in the setting of high glucose likely from shifting  recommend lokelma 10mg BID 12/3  Optimized DM control  Plan for lokelma x 1 today     HTN  BP at goal (however low this AM)

## 2019-12-04 NOTE — PROGRESS NOTE ADULT - PROBLEM SELECTOR PLAN 1
likely due to RSV respiratory infection  - persistent abnormal s/s including sob and erickson   - increase solumedrol dosing to tid  - azithromycin, nebs  - O2 supplementation to maintain spo2 > 90%  - smoking cessation discussed  PT  All team members management and pt care appreciated

## 2019-12-04 NOTE — CONSULT NOTE ADULT - ATTENDING COMMENTS
Will increase BID basal insulin to be Lantus 78u AM/65u PM.  Will increase Humalog to be 30u before each meal.

## 2019-12-04 NOTE — PROGRESS NOTE ADULT - PROBLEM SELECTOR PLAN 1
Still feels sob: BUT NOT MUCH OF WHEEZING: He is an active smoker: Cont steroids as wellas BD and oxygen: HIS VENOUS ABG IS REVIEWED: HE LIKELY HAS CHR RETENTION AND MILD AC ON CHR HYPERCARBIC RESP FAILURE: WILL REPEAT HIS abg IN am: HE SEEMS COMFORTABLE: I DON'T THINK HE NEEDS BIPAP at this time

## 2019-12-04 NOTE — CONSULT NOTE ADULT - ASSESSMENT
Assessment  DMT2: 59y Male with DM T2 with hyperglycemia, A1C 10.4%, was on insulin at home, now on high-dose basal bolus insulin, blood sugars still running high in the setting of steroid management, no hypoglycemic episode, eating meals, non compliant with low carb diet.  COPD Exacerbation: on medications and nebulizer treatment, stable, monitored.  HTN: Controlled,  on antihypertensive medications.  HLD: Controlled, on statin.  Schizophrenia: On meds.  Obesity: No strict exercise routines, not on any weight loss program, neither on low calorie diet.          Laury Romero MD  Cell: [phone]  Office: [phone] Assessment  DMT2: 59y Male with DM T2 with hyperglycemia, A1C 10.4%, was on insulin at home,  now on high-dose basal bolus insulin, blood sugars still running high in the setting of steroid management, no hypoglycemic episode, eating meals, non compliant with low carb diet.  COPD Exacerbation: on medications and nebulizer treatment, stable, monitored.  HTN: Controlled,  on antihypertensive medications.  HLD: Controlled, on statin.  Schizophrenia: On meds.  Obesity: No strict exercise routines, not on any weight loss program, neither on low calorie diet.          Laury Romero MD  Cell: [phone]  Office: [phone]

## 2019-12-04 NOTE — PROGRESS NOTE ADULT - ASSESSMENT
Patient is a 59y old  Male with COPD on 2L NC @ home, pulmonary HTN, CAD and stents, IDDM2, paranoid schizophrenia, anxiety,  now presents to the ER for evaluation of worsening SOB and cough for 1 week from long term NH.  He has been there for the past 8 months and states that he was not getting his nebs ATC and sometimes even PRN, thinks that exacerbated his SOB/COPD.  Active tobacco smoker, 2 cigs/day currently.  He has no  fever or chills,. ON arrival, he was tachycardic and tachypneic. The RVP detected RSV. The ID consult requested to assist with further evaluation and antibiotic management.     # Viral Pneumonia- RSV  # COPD Exacerbation    would recommend:    1. OOB to chair   2. Continue Supportive care for RSV, namely supplemental oxygenation and bronchodilator as needed  3. Isolation as per Infection Control Dept. policy   4. Management of COPD exacerbation as per PUlmonary     will follow the patient with you Patient is a 59y old  Male with COPD on 2L NC @ home, pulmonary HTN, CAD and stents, IDDM2, paranoid schizophrenia, anxiety,  now presents to the ER for evaluation of worsening SOB and cough for 1 week from long term NH.  He has been there for the past 8 months and states that he was not getting his nebs ATC and sometimes even PRN, thinks that exacerbated his SOB/COPD.  Active tobacco smoker, 2 cigs/day currently.  He has no  fever or chills,. ON arrival, he was tachycardic and tachypneic. The RVP detected RSV. The ID consult requested to assist with further evaluation and antibiotic management.     # Viral Pneumonia- RSV  # COPD Exacerbation    would recommend:    1. OOB to chair   2. Continue Supportive care for RSV, namely supplemental oxygenation and bronchodilator as needed  3. Isolation as per Infection Control Dept. policy   4. Taper off steroid as tolerated     will follow the patient with you

## 2019-12-04 NOTE — PROGRESS NOTE ADULT - PROBLEM SELECTOR PLAN 2
uncontrolled, hyperglycemia likely due to infection and steroid induced  - ivf, increase humalog premeal, c/w lantus, ihss, monitor acks and adjust rx prn  - Hyponatremia 2/2 hyperglycemia  dm education, lifestyle modifications

## 2019-12-04 NOTE — PROGRESS NOTE ADULT - SUBJECTIVE AND OBJECTIVE BOX
Patient is a 59y old  Male who presents with a chief complaint of SOB (04 Dec 2019 09:44)      Any change in ROS: Doingoimk: he feels almost the same    MEDICATIONS  (STANDING):  albuterol/ipratropium for Nebulization 3 milliLiter(s) Nebulizer every 6 hours  aspirin enteric coated 81 milliGRAM(s) Oral daily  atorvastatin 40 milliGRAM(s) Oral at bedtime  azithromycin   Tablet 250 milliGRAM(s) Oral daily  budesonide 160 MICROgram(s)/formoterol 4.5 MICROgram(s) Inhaler 2 Puff(s) Inhalation two times a day  clopidogrel Tablet 75 milliGRAM(s) Oral daily  dextrose 5%. 1000 milliLiter(s) (50 mL/Hr) IV Continuous <Continuous>  dextrose 50% Injectable 12.5 Gram(s) IV Push once  dextrose 50% Injectable 25 Gram(s) IV Push once  dextrose 50% Injectable 25 Gram(s) IV Push once  enoxaparin Injectable 40 milliGRAM(s) SubCutaneous daily  famotidine    Tablet 20 milliGRAM(s) Oral daily  insulin glargine Injectable (LANTUS) 66 Unit(s) SubCutaneous two times a day  insulin lispro (HumaLOG) corrective regimen sliding scale   SubCutaneous three times a day before meals  insulin lispro (HumaLOG) corrective regimen sliding scale   SubCutaneous at bedtime  insulin lispro Injectable (HumaLOG) 27 Unit(s) SubCutaneous three times a day before meals  melatonin 5 milliGRAM(s) Oral at bedtime  methylPREDNISolone sodium succinate Injectable 20 milliGRAM(s) IV Push every 8 hours  nicotine -  14 mG/24Hr(s) Patch 1 patch Transdermal daily  risperiDONE   Tablet 0.5 milliGRAM(s) Oral three times a day  sodium chloride 0.9%. 1000 milliLiter(s) (60 mL/Hr) IV Continuous <Continuous>  sodium zirconium cyclosilicate 10 Gram(s) Oral daily    MEDICATIONS  (PRN):  acetaminophen   Tablet .. 1000 milliGRAM(s) Oral every 8 hours PRN Moderate Pain (4 - 6)  dextrose 40% Gel 15 Gram(s) Oral once PRN Blood Glucose LESS THAN 70 milliGRAM(s)/deciliter  glucagon  Injectable 1 milliGRAM(s) IntraMuscular once PRN Glucose LESS THAN 70 milligrams/deciliter  ibuprofen  Tablet. 800 milliGRAM(s) Oral three times a day PRN Severe Pain (7 - 10)  lidocaine   Patch 1 Patch Transdermal daily PRN back pain    Vital Signs Last 24 Hrs  T(C): 36.4 (04 Dec 2019 05:18), Max: 36.4 (04 Dec 2019 05:18)  T(F): 97.5 (04 Dec 2019 05:18), Max: 97.5 (04 Dec 2019 05:18)  HR: 88 (04 Dec 2019 05:40) (87 - 105)  BP: 97/58 (04 Dec 2019 05:18) (97/58 - 115/74)  BP(mean): --  RR: 20 (04 Dec 2019 05:18) (18 - 20)  SpO2: 99% (04 Dec 2019 05:40) (94% - 99%)    I&O's Summary    03 Dec 2019 07:01  -  04 Dec 2019 07:00  --------------------------------------------------------  IN: 280 mL / OUT: 0 mL / NET: 280 mL          Physical Exam:   GENERAL: NAD, well-groomed, well-developed  HEENT: GALO/   Atraumatic, Normocephalic  ENMT: No tonsillar erythema, exudates, or enlargement; Moist mucous membranes, Good dentition, No lesions  NECK: Supple, No JVD, Normal thyroid  CHEST/LUNG: Clear to auscultaion: poor air en try   CVS: Regular rate and rhythm; No murmurs, rubs, or gallops  GI: : Soft, Nontender, Nondistended; Bowel sounds present  NERVOUS SYSTEM:  Alert & Oriented X3  EXTREMITIES:  2+ Peripheral Pulses, No clubbing, cyanosis, or edema  LYMPH: No lymphadenopathy noted  SKIN: No rashes or lesions  ENDOCRINOLOGY: No Thyromegaly  PSYCH: Appropriate    Labs:  33                            13.2   6.93  )-----------( 197      ( 03 Dec 2019 09:37 )             41.1                         13.3   5.83  )-----------( 186      ( 02 Dec 2019 23:50 )             41.6     12-03    129<L>  |  89<L>  |  22  ----------------------------<  397<H>  5.4<H>   |  27  |  0.65  12-03    127<L>  |  89<L>  |  20  ----------------------------<  502<HH>  6.2<HH>   |  24  |  0.63  12-03    131<L>  |  94<L>  |  17  ----------------------------<  359<H>  5.5<H>   |  24  |  0.73  12-02    133<L>  |  92<L>  |  16  ----------------------------<  321<H>  5.4<H>   |  28  |  0.86    Ca    9.2      03 Dec 2019 12:37  Ca    9.1      03 Dec 2019 09:37  Ca    9.0      03 Dec 2019 02:00  Ca    9.4      02 Dec 2019 23:50    TPro  7.4  /  Alb  4.3  /  TBili  0.1<L>  /  DBili  x   /  AST  15  /  ALT  21  /  AlkPhos  80  12-02    CAPILLARY BLOOD GLUCOSE      POCT Blood Glucose.: 356 mg/dL (04 Dec 2019 08:28)  POCT Blood Glucose.: 302 mg/dL (03 Dec 2019 21:57)  POCT Blood Glucose.: 342 mg/dL (03 Dec 2019 18:44)  POCT Blood Glucose.: 316 mg/dL (03 Dec 2019 13:12)      LIVER FUNCTIONS - ( 02 Dec 2019 23:50 )  Alb: 4.3 g/dL / Pro: 7.4 g/dL / ALK PHOS: 80 U/L / ALT: 21 U/L / AST: 15 U/L / GGT: x           PT/INR - ( 02 Dec 2019 23:50 )   PT: 11.5 sec;   INR: 1.00 ratio         PTT - ( 02 Dec 2019 23:50 )  PTT:30.5 sec    Procalcitonin, Serum: 0.08 ng/mL (12-04 @ 07:18)  Serum Pro-Brain Natriuretic Peptide: 29 pg/mL (12-02 @ 23:50)    < from: CT Chest No Cont (12.03.19 @ 12:58) >  HEART AND VESSELS: Heart size is normal. No pericardial effusion.   Thoracic aorta and pulmonary artery are normal in diameter. Moderate   calcific coronary atherosclerosis.    VISUALIZED UPPER ABDOMEN: Too small to characterize hypodensity within   the right lobe of the liver (series 2 image 109), unchanged.    CHEST WALL AND BONES: No aggressive osseous lesion. Bilateral   gynecomastia. Old fracture deformity of posterior lower ribs.    LOWER NECK: Within normal limits.    IMPRESSION:     No pneumonia.    Emphysema.                    ANA JONAS M.D., ATTENDING RADIOLOGIST  Thisdocument has been electronically signed. Dec  3 2019  1:32PM        < end of copied text >      RECENT CULTURES:        RESPIRATORY CULTURES:          Studies  Chest X-RAY  CT SCAN Chest   Venous Dopplers: LE:   CT Abdomen  Others

## 2019-12-04 NOTE — PROGRESS NOTE ADULT - SUBJECTIVE AND OBJECTIVE BOX
[facility]   MD Chelsea Burroughs D.O. Fatima Sheikh, D.O. Ruoru Wong, PA    From 7 AM - 5 PM:  OFFICE: [phone]  Dr. Juarez cell: [phone]  Dr. Daniel cell: [phone]  Dr. Kwong cell: [phone]  ALVARO Spencer cell: [phone]    From 5 PM - 7 AM: Answering Service: [phone]      RENAL FOLLOW UP NOTE  --------------------------------------------------------------------------------  HPI: Pt seen and examined at bedside.   Denies SOB, chest pain     PAST HISTORY  --------------------------------------------------------------------------------  No significant changes to PMH, PSH, FHx, SHx, unless otherwise noted    ALLERGIES & MEDICATIONS  --------------------------------------------------------------------------------  Allergies    No Known Allergies    Intolerances      Standing Inpatient Medications  albuterol/ipratropium for Nebulization 3 milliLiter(s) Nebulizer every 6 hours  aspirin enteric coated 81 milliGRAM(s) Oral daily  atorvastatin 40 milliGRAM(s) Oral at bedtime  azithromycin   Tablet 250 milliGRAM(s) Oral daily  budesonide 160 MICROgram(s)/formoterol 4.5 MICROgram(s) Inhaler 2 Puff(s) Inhalation two times a day  chlorhexidine 4% Liquid 1 Application(s) Topical <User Schedule>  clopidogrel Tablet 75 milliGRAM(s) Oral daily  dextrose 5%. 1000 milliLiter(s) IV Continuous <Continuous>  dextrose 50% Injectable 12.5 Gram(s) IV Push once  dextrose 50% Injectable 25 Gram(s) IV Push once  dextrose 50% Injectable 25 Gram(s) IV Push once  enoxaparin Injectable 40 milliGRAM(s) SubCutaneous daily  famotidine    Tablet 20 milliGRAM(s) Oral daily  insulin glargine Injectable (LANTUS) 66 Unit(s) SubCutaneous two times a day  insulin lispro (HumaLOG) corrective regimen sliding scale   SubCutaneous three times a day before meals  insulin lispro (HumaLOG) corrective regimen sliding scale   SubCutaneous at bedtime  insulin lispro Injectable (HumaLOG) 27 Unit(s) SubCutaneous three times a day before meals  melatonin 5 milliGRAM(s) Oral at bedtime  methylPREDNISolone sodium succinate Injectable 20 milliGRAM(s) IV Push every 8 hours  nicotine -  14 mG/24Hr(s) Patch 1 patch Transdermal daily  risperiDONE   Tablet 0.5 milliGRAM(s) Oral three times a day  sodium chloride 0.9%. 1000 milliLiter(s) IV Continuous <Continuous>  sodium zirconium cyclosilicate 10 Gram(s) Oral daily    PRN Inpatient Medications  acetaminophen   Tablet .. 1000 milliGRAM(s) Oral every 8 hours PRN  dextrose 40% Gel 15 Gram(s) Oral once PRN  glucagon  Injectable 1 milliGRAM(s) IntraMuscular once PRN  ibuprofen  Tablet. 800 milliGRAM(s) Oral three times a day PRN  lidocaine   Patch 1 Patch Transdermal daily PRN      REVIEW OF SYSTEMS  --------------------------------------------------------------------------------  General: no fever  CVS: no chest pain  RESP: no sob, no cough  ABD: no abdominal pain  : no dysuria  MSK: no edema     VITALS/PHYSICAL EXAM  --------------------------------------------------------------------------------  T(C): 36.4 (12-04-19 @ 05:18), Max: 36.4 (12-04-19 @ 05:18)  HR: 88 (12-04-19 @ 05:40) (87 - 105)  BP: 97/58 (12-04-19 @ 05:18) (97/58 - 115/74)  RR: 20 (12-04-19 @ 05:18) (18 - 20)  SpO2: 99% (12-04-19 @ 05:40) (94% - 99%)  Wt(kg): --  Height (cm): 170.18 (12-02-19 @ 23:13)  Weight (kg): 99.3 (12-02-19 @ 23:13)  BMI (kg/m2): 34.3 (12-02-19 @ 23:13)  BSA (m2): 2.1 (12-02-19 @ 23:13)      12-03-19 @ 07:01  -  12-04-19 @ 07:00  --------------------------------------------------------  IN: 280 mL / OUT: 0 mL / NET: 280 mL      Physical Exam:  	Gen: NAD  	HEENT: MMM  	Pulm: CTA B/L  	CV: S1S2  	Abd: Soft, +BS  	Ext: No LE edema B/L                      Neuro: Awake   	Skin: Warm and Dry       LABS/STUDIES  --------------------------------------------------------------------------------              11.9   6.73  >-----------<  221      [12-04-19 @ 10:58]              37.9     134  |  91  |  21  ----------------------------<  416      [12-04-19 @ 10:58]  5.3   |  30  |  0.62        Ca     9.1     [12-04-19 @ 10:58]      Mg     2.3     [12-04-19 @ 10:58]    TPro  7.4  /  Alb  4.3  /  TBili  0.1  /  DBili  x   /  AST  15  /  ALT  21  /  AlkPhos  80  [12-02-19 @ 23:50]    PT/INR: PT 11.5 , INR 1.00       [12-02-19 @ 23:50]  PTT: 30.5       [12-02-19 @ 23:50]      Creatinine Trend:  SCr 0.62 [12-04 @ 10:58]  SCr 0.65 [12-03 @ 12:37]  SCr 0.63 [12-03 @ 09:37]  SCr 0.73 [12-03 @ 02:00]  SCr 0.86 [12-02 @ 23:50]        HbA1c 10.4      [12-04-19 @ 08:53]

## 2019-12-04 NOTE — CONSULT NOTE ADULT - CONSULT REQUESTED BY NAME
Airway  Date/Time: 5/2/2019 12:27 PM  Urgency: elective    Airway not difficult    General Information and Staff    Patient location during procedure: OR  Anesthesiologist: Herbert Crain MD  Performed: anesthesiologist     Indications and Patient Condition Dr. Huang

## 2019-12-04 NOTE — CONSULT NOTE ADULT - SUBJECTIVE AND OBJECTIVE BOX
HPI:  59 year-old male with history of COPD on 2L NC @ home, pulmonary HTN, CAD and stents, IDDM2, paranoid schizophrenia, anxietyp/w worsening SOB and cough for 1 week from long term NH.  He has been there for the past 8 months and states that he was not getting his nebs ATC and sometimes even PRN, thinks that exacerbated his SOB/COPD.  Active tobacco smoker, 2 cigs/day currently.  Denies fevers, chills, nausea, vomiting, abdominal pain, LE edema or sick contact. Wants Benzo to calm him down and to sleep. (03 Dec 2019 04:54)    Patient has history of diabetes, A1C 10.4%, on insulin at rehab (66u Lantus BID, 24u Humalog before meals), no recent hypoglycemic episodes, no polyuria polydipsia. Patient follows up with PCP for diabetes management.  Endo was consulted for glycemic control.    PAST MEDICAL & SURGICAL HISTORY:  Oxygen dependent  Gastroesophageal reflux disease, esophagitis presence not specified  Smoker  Bipolar 1 disorder  Coronary artery disease involving native coronary artery of native heart without angina pectoris  Type 2 diabetes mellitus without complication, with long-term current use of insulin  Pulmonary HTN  HLD (hyperlipidemia)  Stented coronary artery  COPD (chronic obstructive pulmonary disease)  No significant past surgical history      FAMILY HISTORY:  No family history of mental disorder  No family history of hypertension  No family history of chronic obstructive pulmonary disease  No family history of cardiovascular disease      Social History:    Outpatient Medications:    MEDICATIONS  (STANDING):  albuterol/ipratropium for Nebulization 3 milliLiter(s) Nebulizer every 6 hours  aspirin enteric coated 81 milliGRAM(s) Oral daily  atorvastatin 40 milliGRAM(s) Oral at bedtime  azithromycin   Tablet 250 milliGRAM(s) Oral daily  budesonide 160 MICROgram(s)/formoterol 4.5 MICROgram(s) Inhaler 2 Puff(s) Inhalation two times a day  chlorhexidine 4% Liquid 1 Application(s) Topical <User Schedule>  clopidogrel Tablet 75 milliGRAM(s) Oral daily  dextrose 5%. 1000 milliLiter(s) (50 mL/Hr) IV Continuous <Continuous>  dextrose 50% Injectable 12.5 Gram(s) IV Push once  dextrose 50% Injectable 25 Gram(s) IV Push once  dextrose 50% Injectable 25 Gram(s) IV Push once  enoxaparin Injectable 40 milliGRAM(s) SubCutaneous daily  famotidine    Tablet 20 milliGRAM(s) Oral daily  insulin glargine Injectable (LANTUS) 78 Unit(s) SubCutaneous once  insulin lispro (HumaLOG) corrective regimen sliding scale   SubCutaneous three times a day before meals  insulin lispro Injectable (HumaLOG) 30 Unit(s) SubCutaneous three times a day before meals  insulin lispro Injectable (HumaLOG) 30 Unit(s) SubCutaneous once  melatonin 5 milliGRAM(s) Oral at bedtime  methylPREDNISolone sodium succinate Injectable 20 milliGRAM(s) IV Push every 8 hours  nicotine -  14 mG/24Hr(s) Patch 1 patch Transdermal daily  risperiDONE   Tablet 0.5 milliGRAM(s) Oral three times a day  sodium chloride 0.9%. 1000 milliLiter(s) (60 mL/Hr) IV Continuous <Continuous>  sodium zirconium cyclosilicate 10 Gram(s) Oral once    MEDICATIONS  (PRN):  acetaminophen   Tablet .. 1000 milliGRAM(s) Oral every 8 hours PRN Moderate Pain (4 - 6)  dextrose 40% Gel 15 Gram(s) Oral once PRN Blood Glucose LESS THAN 70 milliGRAM(s)/deciliter  glucagon  Injectable 1 milliGRAM(s) IntraMuscular once PRN Glucose LESS THAN 70 milligrams/deciliter  ibuprofen  Tablet. 800 milliGRAM(s) Oral three times a day PRN Severe Pain (7 - 10)  lidocaine   Patch 1 Patch Transdermal daily PRN back pain      Allergies    No Known Allergies    Intolerances      Review of Systems:  Constitutional: No fever, no chills  Eyes: No blurry vision  Neuro: No tremors  HEENT: No pain, no neck swelling  Cardiovascular: No chest pain, no palpitations  Respiratory: Has SOB, no cough  GI: No nausea, vomiting, abdominal pain  : No dysuria  Skin: no rash  MSK: Has leg swelling.  Psych: no depression  Endocrine: no polyuria, polydipsia    ALL OTHER SYSTEMS REVIEWED AND NEGATIVE    UNABLE TO OBTAIN    PHYSICAL EXAM:  VITALS: T(C): 36.4 (12-04-19 @ 05:18)  T(F): 97.5 (12-04-19 @ 05:18), Max: 97.5 (12-04-19 @ 05:18)  HR: 85 (12-04-19 @ 12:17) (85 - 105)  BP: 97/58 (12-04-19 @ 05:18) (97/58 - 115/74)  RR:  (18 - 20)  SpO2:  (94% - 99%)  Wt(kg): --  GENERAL: NAD, well-groomed, well-developed  EYES: No proptosis, no lid lag  HEENT:  Atraumatic, Normocephalic  THYROID: Normal size, no palpable nodules  RESPIRATORY: Clear to auscultation bilaterally; No rales, rhonchi, wheezing  CARDIOVASCULAR: Si S2, No murmurs;  GI: Soft, non distended, normal bowel sounds  SKIN: Dry, intact, No rashes or lesions  MUSCULOSKELETAL: Has BL lower extremity edema.  NEURO:  no tremor, sensation decreased in feet BL,    POCT Blood Glucose.: 335 mg/dL (12-04-19 @ 12:04)  POCT Blood Glucose.: 356 mg/dL (12-04-19 @ 08:28)  POCT Blood Glucose.: 302 mg/dL (12-03-19 @ 21:57)  POCT Blood Glucose.: 342 mg/dL (12-03-19 @ 18:44)  POCT Blood Glucose.: 316 mg/dL (12-03-19 @ 13:12)  POCT Blood Glucose.: 455 mg/dL (12-03-19 @ 09:19)  POCT Blood Glucose.: 463 mg/dL (12-03-19 @ 09:18)                            11.9   6.73  )-----------( 221      ( 04 Dec 2019 10:58 )             37.9       12-04    134<L>  |  91<L>  |  21  ----------------------------<  416<H>  5.3   |  30  |  0.62    EGFR if : 128  EGFR if non : 110    Ca    9.1      12-04  Mg     2.3     12-04    TPro  7.4  /  Alb  4.3  /  TBili  0.1<L>  /  DBili  x   /  AST  15  /  ALT  21  /  AlkPhos  80  12-02      Thyroid Function Tests:      Hemoglobin A1C, Whole Blood: 10.4 % <H> [4.0 - 5.6] (12-04-19 @ 08:53)          Radiology:

## 2019-12-04 NOTE — PROGRESS NOTE ADULT - SUBJECTIVE AND OBJECTIVE BOX
Patient is seen and examined at the bed side, is afebrile. He is doing better, SOB has improved.        REVIEW OF SYSTEMS: All other review systems are negative          ALLERGIES: No Known Allergies      Vital Signs Last 24 Hrs  T(C): 36.9 (04 Dec 2019 20:46), Max: 36.9 (04 Dec 2019 20:46)  T(F): 98.4 (04 Dec 2019 20:46), Max: 98.4 (04 Dec 2019 20:46)  HR: 92 (04 Dec 2019 20:46) (74 - 103)  BP: 108/71 (04 Dec 2019 20:46) (97/58 - 110/60)  BP(mean): --  RR: 20 (04 Dec 2019 20:46) (20 - 20)  SpO2: 97% (04 Dec 2019 20:46) (95% - 99%)      PHYSICAL EXAM:  GENERAL: Not in distress, on oxygen  via NC  CHEST/LUNG:  Air entry bilaterally with No  wheezing today  HEART: s1 and s2 present  ABDOMEN:  Nontender and  Nondistended  EXTREMITIES: No pedal  edema  CNS: Awake and Alert        LABS:                        11.9   6.73  )-----------( 221      ( 04 Dec 2019 10:58 )             37.9                           13.2   6.93  )-----------( 197      ( 03 Dec 2019 09:37 )             41.1       12-04    134<L>  |  91<L>  |  21  ----------------------------<  416<H>  5.3   |  30  |  0.62    Ca    9.1      04 Dec 2019 10:58  Mg     2.3     12-04    TPro  7.4  /  Alb  4.3  /  TBili  0.1<L>  /  DBili  x   /  AST  15  /  ALT  21  /  AlkPhos  80  12-02    12-03    129<L>  |  89<L>  |  22  ----------------------------<  397<H>  5.4<H>   |  27  |  0.65    Ca    9.2      03 Dec 2019 12:37    TPro  7.4  /  Alb  4.3  /  TBili  0.1<L>  /  DBili  x   /  AST  15  /  ALT  21  /  AlkPhos  80  12-02    PT/INR - ( 02 Dec 2019 23:50 )   PT: 11.5 sec;   INR: 1.00 ratio     PTT - ( 02 Dec 2019 23:50 )  PTT:30.5 sec        CAPILLARY BLOOD GLUCOSE  POCT Blood Glucose.: 342 mg/dL (03 Dec 2019 18:44)  POCT Blood Glucose.: 316 mg/dL (03 Dec 2019 13:12)  POCT Blood Glucose.: 455 mg/dL (03 Dec 2019 09:19)  POCT Blood Glucose.: 463 mg/dL (03 Dec 2019 09:18)        MEDICATIONS  (STANDING):  albuterol/ipratropium for Nebulization 3 milliLiter(s) Nebulizer every 6 hours  aspirin enteric coated 81 milliGRAM(s) Oral daily  atorvastatin 40 milliGRAM(s) Oral at bedtime  azithromycin   Tablet 250 milliGRAM(s) Oral daily  budesonide 160 MICROgram(s)/formoterol 4.5 MICROgram(s) Inhaler 2 Puff(s) Inhalation two times a day  chlorhexidine 4% Liquid 1 Application(s) Topical <User Schedule>  clopidogrel Tablet 75 milliGRAM(s) Oral daily  dextrose 5%. 1000 milliLiter(s) (50 mL/Hr) IV Continuous <Continuous>  dextrose 50% Injectable 12.5 Gram(s) IV Push once  dextrose 50% Injectable 25 Gram(s) IV Push once  dextrose 50% Injectable 25 Gram(s) IV Push once  enoxaparin Injectable 40 milliGRAM(s) SubCutaneous daily  famotidine    Tablet 20 milliGRAM(s) Oral daily  insulin glargine Injectable (LANTUS) 65 Unit(s) SubCutaneous at bedtime  insulin lispro (HumaLOG) corrective regimen sliding scale   SubCutaneous three times a day before meals  insulin lispro Injectable (HumaLOG) 30 Unit(s) SubCutaneous three times a day before meals  melatonin 5 milliGRAM(s) Oral at bedtime  methylPREDNISolone sodium succinate Injectable 20 milliGRAM(s) IV Push every 8 hours  nicotine -  14 mG/24Hr(s) Patch 1 patch Transdermal daily  risperiDONE   Tablet 0.5 milliGRAM(s) Oral three times a day  sodium chloride 0.9%. 1000 milliLiter(s) (60 mL/Hr) IV Continuous <Continuous>    MEDICATIONS  (PRN):  acetaminophen   Tablet .. 1000 milliGRAM(s) Oral every 8 hours PRN Moderate Pain (4 - 6)  dextrose 40% Gel 15 Gram(s) Oral once PRN Blood Glucose LESS THAN 70 milliGRAM(s)/deciliter  glucagon  Injectable 1 milliGRAM(s) IntraMuscular once PRN Glucose LESS THAN 70 milligrams/deciliter  ibuprofen  Tablet. 800 milliGRAM(s) Oral three times a day PRN Severe Pain (7 - 10)  lidocaine   Patch 1 Patch Transdermal daily PRN back pain            RADIOLOGY & ADDITIONAL TESTS:    12/3/19  CT Chest No Cont (12.03.19 @ 12:58) AIRWAYS, LUNGS and PLEURA: Patent central airways. Mild bronchial wall   thickening. No bronchiectasis. Moderate upper lung predominant emphysema.  Mild bandlike atelectasis within the right middle lobe and lingula.  Otherwise, the lungs are clear without consolidation.  No pleural effusion.      MICROBIOLOGY DATA:      Rapid Respiratory Viral Panel (12.02.19 @ 23:50)    Rapid RVP Result: Detected: This Respiratory Panel uses polymerase chain reaction (PCR) to detect for  adenovirus; coronavirus (HKU1, NL63, 229E, OC43); human metapneumovirus  (hMPV); human enterovirus/rhinovirus (Entero/RV); influenza A; influenza  A/H1; influenza A/H3; influenza A/H1-2009; influenza B; parainfluenza  viruses 1, 2, 3, 4; respiratory syncytial virus; Mycoplasma pneumoniae;  and Chlamydophila pneumoniae.

## 2019-12-04 NOTE — CONSULT NOTE ADULT - PROBLEM SELECTOR RECOMMENDATION 9
Will increase BID basal insulin to be Lantus 78u AM/65u PM.  Will increase Humalog to be 30u before each meal.  Will continue monitoring FS, log, and FU.  Patient counseled for compliance with consistent low carb diet and exercise as tolerated outpatient. Will continue monitoring FS, log, and FU.  Patient counseled for compliance with consistent low carb diet and exercise as tolerated outpatient.

## 2019-12-05 LAB
GLUCOSE BLDC GLUCOMTR-MCNC: 181 MG/DL — HIGH (ref 70–99)
GLUCOSE BLDC GLUCOMTR-MCNC: 293 MG/DL — HIGH (ref 70–99)
GLUCOSE BLDC GLUCOMTR-MCNC: 372 MG/DL — HIGH (ref 70–99)
GLUCOSE BLDC GLUCOMTR-MCNC: 383 MG/DL — HIGH (ref 70–99)
GLUCOSE BLDC GLUCOMTR-MCNC: 389 MG/DL — HIGH (ref 70–99)

## 2019-12-05 RX ORDER — SODIUM ZIRCONIUM CYCLOSILICATE 10 G/10G
10 POWDER, FOR SUSPENSION ORAL ONCE
Refills: 0 | Status: COMPLETED | OUTPATIENT
Start: 2019-12-05 | End: 2019-12-05

## 2019-12-05 RX ORDER — INSULIN LISPRO 100/ML
36 VIAL (ML) SUBCUTANEOUS
Refills: 0 | Status: DISCONTINUED | OUTPATIENT
Start: 2019-12-05 | End: 2019-12-07

## 2019-12-05 RX ORDER — INSULIN GLARGINE 100 [IU]/ML
80 INJECTION, SOLUTION SUBCUTANEOUS EVERY MORNING
Refills: 0 | Status: DISCONTINUED | OUTPATIENT
Start: 2019-12-06 | End: 2019-12-07

## 2019-12-05 RX ORDER — OXYCODONE AND ACETAMINOPHEN 5; 325 MG/1; MG/1
2 TABLET ORAL ONCE
Refills: 0 | Status: DISCONTINUED | OUTPATIENT
Start: 2019-12-05 | End: 2019-12-05

## 2019-12-05 RX ORDER — INSULIN GLARGINE 100 [IU]/ML
75 INJECTION, SOLUTION SUBCUTANEOUS AT BEDTIME
Refills: 0 | Status: DISCONTINUED | OUTPATIENT
Start: 2019-12-05 | End: 2019-12-11

## 2019-12-05 RX ORDER — OXYCODONE AND ACETAMINOPHEN 5; 325 MG/1; MG/1
2 TABLET ORAL EVERY 6 HOURS
Refills: 0 | Status: DISCONTINUED | OUTPATIENT
Start: 2019-12-05 | End: 2019-12-12

## 2019-12-05 RX ADMIN — OXYCODONE AND ACETAMINOPHEN 2 TABLET(S): 5; 325 TABLET ORAL at 23:30

## 2019-12-05 RX ADMIN — Medication 20 MILLIGRAM(S): at 13:16

## 2019-12-05 RX ADMIN — Medication 20 MILLIGRAM(S): at 06:16

## 2019-12-05 RX ADMIN — Medication 800 MILLIGRAM(S): at 13:27

## 2019-12-05 RX ADMIN — Medication 1000 MILLIGRAM(S): at 21:14

## 2019-12-05 RX ADMIN — SODIUM ZIRCONIUM CYCLOSILICATE 10 GRAM(S): 10 POWDER, FOR SUSPENSION ORAL at 17:58

## 2019-12-05 RX ADMIN — Medication 3 MILLILITER(S): at 23:22

## 2019-12-05 RX ADMIN — OXYCODONE AND ACETAMINOPHEN 2 TABLET(S): 5; 325 TABLET ORAL at 11:25

## 2019-12-05 RX ADMIN — Medication 1 PATCH: at 07:30

## 2019-12-05 RX ADMIN — Medication 1000 MILLIGRAM(S): at 21:30

## 2019-12-05 RX ADMIN — Medication 5: at 09:03

## 2019-12-05 RX ADMIN — RISPERIDONE 0.5 MILLIGRAM(S): 4 TABLET ORAL at 13:17

## 2019-12-05 RX ADMIN — RISPERIDONE 0.5 MILLIGRAM(S): 4 TABLET ORAL at 06:16

## 2019-12-05 RX ADMIN — Medication 1: at 17:57

## 2019-12-05 RX ADMIN — Medication 1 PATCH: at 11:26

## 2019-12-05 RX ADMIN — AZITHROMYCIN 250 MILLIGRAM(S): 500 TABLET, FILM COATED ORAL at 11:34

## 2019-12-05 RX ADMIN — SODIUM CHLORIDE 60 MILLILITER(S): 9 INJECTION INTRAMUSCULAR; INTRAVENOUS; SUBCUTANEOUS at 06:16

## 2019-12-05 RX ADMIN — OXYCODONE AND ACETAMINOPHEN 2 TABLET(S): 5; 325 TABLET ORAL at 16:44

## 2019-12-05 RX ADMIN — Medication 1000 MILLIGRAM(S): at 09:07

## 2019-12-05 RX ADMIN — Medication 3 MILLILITER(S): at 18:12

## 2019-12-05 RX ADMIN — RISPERIDONE 0.5 MILLIGRAM(S): 4 TABLET ORAL at 21:14

## 2019-12-05 RX ADMIN — Medication 1000 MILLIGRAM(S): at 06:16

## 2019-12-05 RX ADMIN — Medication 81 MILLIGRAM(S): at 11:29

## 2019-12-05 RX ADMIN — ATORVASTATIN CALCIUM 40 MILLIGRAM(S): 80 TABLET, FILM COATED ORAL at 21:13

## 2019-12-05 RX ADMIN — Medication 3: at 13:15

## 2019-12-05 RX ADMIN — Medication 5 MILLIGRAM(S): at 21:13

## 2019-12-05 RX ADMIN — FAMOTIDINE 20 MILLIGRAM(S): 10 INJECTION INTRAVENOUS at 11:29

## 2019-12-05 RX ADMIN — CLOPIDOGREL BISULFATE 75 MILLIGRAM(S): 75 TABLET, FILM COATED ORAL at 11:29

## 2019-12-05 RX ADMIN — OXYCODONE AND ACETAMINOPHEN 2 TABLET(S): 5; 325 TABLET ORAL at 17:14

## 2019-12-05 RX ADMIN — ENOXAPARIN SODIUM 40 MILLIGRAM(S): 100 INJECTION SUBCUTANEOUS at 11:29

## 2019-12-05 RX ADMIN — INSULIN GLARGINE 78 UNIT(S): 100 INJECTION, SOLUTION SUBCUTANEOUS at 09:05

## 2019-12-05 RX ADMIN — Medication 3 MILLILITER(S): at 05:59

## 2019-12-05 RX ADMIN — Medication 36 UNIT(S): at 17:57

## 2019-12-05 RX ADMIN — Medication 1 PATCH: at 11:30

## 2019-12-05 RX ADMIN — Medication 3 MILLILITER(S): at 12:21

## 2019-12-05 RX ADMIN — Medication 30 UNIT(S): at 09:05

## 2019-12-05 RX ADMIN — OXYCODONE AND ACETAMINOPHEN 2 TABLET(S): 5; 325 TABLET ORAL at 11:55

## 2019-12-05 RX ADMIN — Medication 36 UNIT(S): at 13:16

## 2019-12-05 RX ADMIN — INSULIN GLARGINE 75 UNIT(S): 100 INJECTION, SOLUTION SUBCUTANEOUS at 21:46

## 2019-12-05 RX ADMIN — BUDESONIDE AND FORMOTEROL FUMARATE DIHYDRATE 2 PUFF(S): 160; 4.5 AEROSOL RESPIRATORY (INHALATION) at 05:59

## 2019-12-05 RX ADMIN — Medication 20 MILLIGRAM(S): at 16:44

## 2019-12-05 RX ADMIN — BUDESONIDE AND FORMOTEROL FUMARATE DIHYDRATE 2 PUFF(S): 160; 4.5 AEROSOL RESPIRATORY (INHALATION) at 18:12

## 2019-12-05 NOTE — PROGRESS NOTE ADULT - PROBLEM SELECTOR PLAN 1
- improving yet persistent abnormal s/s    - c/w iv steriods today, plan to adjust to po 12/5/19 for safe discharge 12/5/19  - azithromycin, nebs  - O2 supplementation to maintain spo2 > 90%  - smoking cessation discussed. PT  - Case management for safe d/c planning  All team members management and pt care appreciated

## 2019-12-05 NOTE — PHYSICAL THERAPY INITIAL EVALUATION ADULT - GENERAL OBSERVATIONS, REHAB EVAL
Pt received semi-supine in bed, (+) 3L NCO2, NAD. Pt alert, impulsive at times, agreeable to PT eval.

## 2019-12-05 NOTE — PROGRESS NOTE ADULT - PROBLEM SELECTOR PLAN 1
Will increase basal insulin to Lantus 80u AM, 75u PM.  Will increase Humalog to 36u before each meal and continue Humalog correction scale coverage.  Will continue monitoring FS and FU; Will adjust regimen once starting to taper steroids.  Patient counseled for compliance with consistent low carb diet and exercise as tolerated outpatient. Will continue monitoring FS and FU; Will adjust regimen once starting to taper steroids.  Patient counseled for compliance with consistent low carb diet and exercise as tolerated outpatient.

## 2019-12-05 NOTE — PROGRESS NOTE ADULT - ASSESSMENT
59 year-old male with history of COPD on 2L NC @ home, pulmonary HTN, CAD and stents, IDDM2, paranoid schizophrenia, anxietyp/w worsening SOB and cough for 1 week from long term NH.  He has been there for the past 8 months and states that he was not getting his nebs ATC and sometimes even PRN, thinks that exacerbated his SOB/COPD. Nephrology consulted for electrolyte abnormality.    Hyponatremia  - in the setting of elevated glucose  - elevated serum osm likely from high glucose  - serum sodium Improved with IVF  - urine sodium low   - Advise to continue IVF today  - optimized DM control   - Avoid over correction of 8- 10 meq in 24 hours   - Recheck BMP in 12 hours     Hyperkalemia   Pt with elevated serum potassium  Improving   s/p lokelma   in the setting of high glucose likely from shifting  recommend lokelma 10mg daily as needed for high potassium   Optimized DM control    HTN  BP at goal (however low this AM)

## 2019-12-05 NOTE — PROGRESS NOTE ADULT - ASSESSMENT
Patient is a 59y old  Male with COPD on 2L NC @ home, pulmonary HTN, CAD and stents, IDDM2, paranoid schizophrenia, anxiety,  now presents to the ER for evaluation of worsening SOB and cough for 1 week from long term NH.  He has been there for the past 8 months and states that he was not getting his nebs ATC and sometimes even PRN, thinks that exacerbated his SOB/COPD.  Active tobacco smoker, 2 cigs/day currently.  He has no  fever or chills,. ON arrival, he was tachycardic and tachypneic. The RVP detected RSV. The ID consult requested to assist with further evaluation and antibiotic management.     # Viral Pneumonia- RSV  # COPD Exacerbation    would recommend:    1. Taper off steroid as tolerated    2. Continue Supportive care for RSV, namely supplemental oxygenation and bronchodilator as needed  3. Isolation as per Infection Control Dept. policy   4.  OOB to chair     -stable from ID stand point

## 2019-12-05 NOTE — PROGRESS NOTE ADULT - SUBJECTIVE AND OBJECTIVE BOX
Patient seen and examined at bedside  c/o acute on chronic lower back pain not improved/controlled with tylenol  mildly improving respiratory symptoms    HPI:  59 year-old male with history of COPD on 2L NC @ home, pulmonary HTN, CAD and stents, IDDM2, paranoid schizophrenia, anxietyp/w worsening SOB and cough for 1 week from long term NH.  He has been there for the past 8 months and states that he was not getting his nebs ATC and sometimes even PRN, thinks that exacerbated his SOB/COPD.  Active tobacco smoker, 2 cigs/day currently.  Denies fevers, chills, nausea, vomiting, abdominal pain, LE edema or sick contact. Wants Benzo to calm him down and to sleep. (03 Dec 2019 04:54)      PAST MEDICAL & SURGICAL HISTORY:  Oxygen dependent  Gastroesophageal reflux disease, esophagitis presence not specified  Smoker  Bipolar 1 disorder  Coronary artery disease involving native coronary artery of native heart without angina pectoris  Type 2 diabetes mellitus without complication, with long-term current use of insulin  Pulmonary HTN  HLD (hyperlipidemia)  Stented coronary artery  COPD (chronic obstructive pulmonary disease)  No significant past surgical history      No Known Allergies       MEDICATIONS  (STANDING):  albuterol/ipratropium for Nebulization 3 milliLiter(s) Nebulizer every 6 hours  aspirin enteric coated 81 milliGRAM(s) Oral daily  atorvastatin 40 milliGRAM(s) Oral at bedtime  azithromycin   Tablet 250 milliGRAM(s) Oral daily  budesonide 160 MICROgram(s)/formoterol 4.5 MICROgram(s) Inhaler 2 Puff(s) Inhalation two times a day  chlorhexidine 4% Liquid 1 Application(s) Topical <User Schedule>  clopidogrel Tablet 75 milliGRAM(s) Oral daily  dextrose 5%. 1000 milliLiter(s) (50 mL/Hr) IV Continuous <Continuous>  dextrose 50% Injectable 12.5 Gram(s) IV Push once  dextrose 50% Injectable 25 Gram(s) IV Push once  dextrose 50% Injectable 25 Gram(s) IV Push once  enoxaparin Injectable 40 milliGRAM(s) SubCutaneous daily  famotidine    Tablet 20 milliGRAM(s) Oral daily  insulin glargine Injectable (LANTUS) 78 Unit(s) SubCutaneous every morning  insulin glargine Injectable (LANTUS) 65 Unit(s) SubCutaneous at bedtime  insulin lispro (HumaLOG) corrective regimen sliding scale   SubCutaneous three times a day before meals  insulin lispro Injectable (HumaLOG) 36 Unit(s) SubCutaneous three times a day before meals  melatonin 5 milliGRAM(s) Oral at bedtime  methylPREDNISolone sodium succinate Injectable 20 milliGRAM(s) IV Push every 8 hours  nicotine -  14 mG/24Hr(s) Patch 1 patch Transdermal daily  oxycodone    5 mG/acetaminophen 325 mG 2 Tablet(s) Oral once  risperiDONE   Tablet 0.5 milliGRAM(s) Oral three times a day  sodium chloride 0.9%. 1000 milliLiter(s) (60 mL/Hr) IV Continuous <Continuous>    MEDICATIONS  (PRN):  acetaminophen   Tablet .. 1000 milliGRAM(s) Oral every 8 hours PRN Moderate Pain (4 - 6)  dextrose 40% Gel 15 Gram(s) Oral once PRN Blood Glucose LESS THAN 70 milliGRAM(s)/deciliter  glucagon  Injectable 1 milliGRAM(s) IntraMuscular once PRN Glucose LESS THAN 70 milligrams/deciliter  ibuprofen  Tablet. 800 milliGRAM(s) Oral three times a day PRN Severe Pain (7 - 10)  lidocaine   Patch 1 Patch Transdermal daily PRN back pain  oxycodone    5 mG/acetaminophen 325 mG 2 Tablet(s) Oral every 6 hours PRN Severe Pain (7 - 10)      REVIEW OF SYSTEMS:  CONSTITUTIONAL: (+) malaise. c/o acute on chronic lower back pain not improved/controlled with tylenol  mildly improving respiratory symptoms  EYES: No acute change in vision   ENT:  No tinnitus  NECK: No stiffness  RESPIRATORY: No hemoptysis  CARDIOVASCULAR: No chest pain, palpitations, syncope  GASTROINTESTINAL: No hematemesis, diarrhea, melena, or hematochezia.  GENITOURINARY: No hematuria  NEUROLOGICAL: No headaches  LYMPH Nodes: No enlarged glands  ENDOCRINE: No heat or cold intolerance	    T(C): 36.4 (12-05-19 @ 04:59), Max: 36.9 (12-04-19 @ 20:46)  HR: 74 (12-05-19 @ 06:00) (74 - 103)  BP: 129/86 (12-05-19 @ 04:59) (108/71 - 129/86)  RR: 20 (12-05-19 @ 04:59) (20 - 20)  SpO2: 98% (12-05-19 @ 06:00) (96% - 98%)    PHYSICAL EXAMINATION:   Constitutional: WD, NAD  HEENT: NC, AT  Neck:  Supple  Respiratory:  improving yet persistent sob and erickson. Adequate airflow b/l. Not using accessory muscles of respiration.  Cardiovascular:  S1 & S2 intact, no R/G, 2+ radial pulses b/l  Gastrointestinal: Soft, NT, ND, normoactive b.s., no organomegaly/RT/rigidity  Extremities: tender lumbar paraspinal muscles. WWP  Neurological:  Alert and awake.  No acute focal motor deficits. Crude sensation intact.     Labs and imaging reviewed    LABS:                        11.9   6.73  )-----------( 221      ( 04 Dec 2019 10:58 )             37.9     12-04    134<L>  |  91<L>  |  21  ----------------------------<  416<H>  5.3   |  30  |  0.62    Ca    9.1      04 Dec 2019 10:58  Mg     2.3     12-04              CAPILLARY BLOOD GLUCOSE      POCT Blood Glucose.: 372 mg/dL (05 Dec 2019 08:20)  POCT Blood Glucose.: 351 mg/dL (04 Dec 2019 22:18)  POCT Blood Glucose.: 260 mg/dL (04 Dec 2019 17:49)  POCT Blood Glucose.: 335 mg/dL (04 Dec 2019 12:04)              RADIOLOGY & ADDITIONAL STUDIES:

## 2019-12-05 NOTE — PHYSICAL THERAPY INITIAL EVALUATION ADULT - DISCHARGE DISPOSITION, PT EVAL
home/no skilled PT needs/Return to LTC facility with no skilled PT needs, no AD recommendation. **Pt cleared for d/c home from PT perspective at this time.

## 2019-12-05 NOTE — PROGRESS NOTE ADULT - SUBJECTIVE AND OBJECTIVE BOX
Patient is a 59y old  Male who presents with a chief complaint of SOB (05 Dec 2019 11:20)      Any change in ROS: Doingok : no SOB     MEDICATIONS  (STANDING):  albuterol/ipratropium for Nebulization 3 milliLiter(s) Nebulizer every 6 hours  aspirin enteric coated 81 milliGRAM(s) Oral daily  atorvastatin 40 milliGRAM(s) Oral at bedtime  azithromycin   Tablet 250 milliGRAM(s) Oral daily  budesonide 160 MICROgram(s)/formoterol 4.5 MICROgram(s) Inhaler 2 Puff(s) Inhalation two times a day  chlorhexidine 4% Liquid 1 Application(s) Topical <User Schedule>  clopidogrel Tablet 75 milliGRAM(s) Oral daily  dextrose 5%. 1000 milliLiter(s) (50 mL/Hr) IV Continuous <Continuous>  dextrose 50% Injectable 12.5 Gram(s) IV Push once  dextrose 50% Injectable 25 Gram(s) IV Push once  dextrose 50% Injectable 25 Gram(s) IV Push once  enoxaparin Injectable 40 milliGRAM(s) SubCutaneous daily  famotidine    Tablet 20 milliGRAM(s) Oral daily  insulin glargine Injectable (LANTUS) 75 Unit(s) SubCutaneous at bedtime  insulin lispro (HumaLOG) corrective regimen sliding scale   SubCutaneous three times a day before meals  insulin lispro Injectable (HumaLOG) 36 Unit(s) SubCutaneous three times a day before meals  melatonin 5 milliGRAM(s) Oral at bedtime  methylPREDNISolone sodium succinate Injectable 20 milliGRAM(s) IV Push every 8 hours  nicotine -  14 mG/24Hr(s) Patch 1 patch Transdermal daily  risperiDONE   Tablet 0.5 milliGRAM(s) Oral three times a day  sodium chloride 0.9%. 1000 milliLiter(s) (60 mL/Hr) IV Continuous <Continuous>  sodium zirconium cyclosilicate 10 Gram(s) Oral once    MEDICATIONS  (PRN):  acetaminophen   Tablet .. 1000 milliGRAM(s) Oral every 8 hours PRN Moderate Pain (4 - 6)  dextrose 40% Gel 15 Gram(s) Oral once PRN Blood Glucose LESS THAN 70 milliGRAM(s)/deciliter  glucagon  Injectable 1 milliGRAM(s) IntraMuscular once PRN Glucose LESS THAN 70 milligrams/deciliter  ibuprofen  Tablet. 800 milliGRAM(s) Oral three times a day PRN Severe Pain (7 - 10)  lidocaine   Patch 1 Patch Transdermal daily PRN back pain  oxycodone    5 mG/acetaminophen 325 mG 2 Tablet(s) Oral every 6 hours PRN Severe Pain (7 - 10)    Vital Signs Last 24 Hrs  T(C): 36.6 (05 Dec 2019 13:55), Max: 36.9 (04 Dec 2019 20:46)  T(F): 97.8 (05 Dec 2019 13:55), Max: 98.4 (04 Dec 2019 20:46)  HR: 90 (05 Dec 2019 13:55) (74 - 103)  BP: 126/80 (05 Dec 2019 13:55) (108/71 - 129/86)  BP(mean): --  RR: 20 (05 Dec 2019 13:55) (20 - 20)  SpO2: 96% (05 Dec 2019 13:55) (96% - 98%)    I&O's Summary    04 Dec 2019 07:01  -  05 Dec 2019 07:00  --------------------------------------------------------  IN: 3200 mL / OUT: 1300 mL / NET: 1900 mL          Physical Exam:   GENERAL: NAD, well-groomed, well-developed  HEENT: GALO/   Atraumatic, Normocephalic  ENMT: No tonsillar erythema, exudates, or enlargement; Moist mucous membranes, Good dentition, No lesions  NECK: Supple, No JVD, Normal thyroid  CHEST/LUNG: Clear to auscultaion, ; No rales, rhonchi, wheezing, or rubs  CVS: Regular rate and rhythm; No murmurs, rubs, or gallops  GI: : Soft, Nontender, Nondistended; Bowel sounds present  NERVOUS SYSTEM:  Alert & Oriented X3  EXTREMITIES:  2+ Peripheral Pulses, No clubbing, cyanosis, or edema  LYMPH: No lymphadenopathy noted  SKIN: No rashes or lesions  ENDOCRINOLOGY: No Thyromegaly  PSYCH: Appropriate    Labs:  33                            11.9   6.73  )-----------( 221      ( 04 Dec 2019 10:58 )             37.9                         13.2   6.93  )-----------( 197      ( 03 Dec 2019 09:37 )             41.1                         13.3   5.83  )-----------( 186      ( 02 Dec 2019 23:50 )             41.6     12-04    134<L>  |  91<L>  |  21  ----------------------------<  416<H>  5.3   |  30  |  0.62  12-03    129<L>  |  89<L>  |  22  ----------------------------<  397<H>  5.4<H>   |  27  |  0.65  12-03    127<L>  |  89<L>  |  20  ----------------------------<  502<HH>  6.2<HH>   |  24  |  0.63  12-03    131<L>  |  94<L>  |  17  ----------------------------<  359<H>  5.5<H>   |  24  |  0.73  12-02    133<L>  |  92<L>  |  16  ----------------------------<  321<H>  5.4<H>   |  28  |  0.86    Ca    9.1      04 Dec 2019 10:58  Mg     2.3     12-04    TPro  7.4  /  Alb  4.3  /  TBili  0.1<L>  /  DBili  x   /  AST  15  /  ALT  21  /  AlkPhos  80  12-02    CAPILLARY BLOOD GLUCOSE      POCT Blood Glucose.: 293 mg/dL (05 Dec 2019 13:08)  POCT Blood Glucose.: 383 mg/dL (05 Dec 2019 11:59)  POCT Blood Glucose.: 372 mg/dL (05 Dec 2019 08:20)  POCT Blood Glucose.: 351 mg/dL (04 Dec 2019 22:18)  POCT Blood Glucose.: 260 mg/dL (04 Dec 2019 17:49)            Procalcitonin, Serum: 0.08 ng/mL (12-04 @ 07:18)  Serum Pro-Brain Natriuretic Peptide: 29 pg/mL (12-02 @ 23:50)        RECENT CULTURES:    < from: CT Chest No Cont (12.03.19 @ 12:58) >  COMPARISON: CT chest 10/5/2018    PROCEDURE:   CT of the Chest was performed without intravenous contrast.  Sagittal and coronal reformats were performed.      FINDINGS:    AIRWAYS, LUNGS and PLEURA: Patent central airways. Mild bronchial wall   thickening. No bronchiectasis. Moderate upper lung predominant emphysema.   Mild bandlike atelectasis within the right middle lobe and lingula.   Otherwise, the lungs are clear without consolidation.    No pleural effusion.    MEDIASTINUM AND [place]: No lymphadenopathy.    HEART AND VESSELS: Heart size is normal. No pericardial effusion.   Thoracic aorta and pulmonary artery are normal in diameter. Moderate   calcific coronary atherosclerosis.    VISUALIZED UPPER ABDOMEN: Too small to characterize hypodensity within   the right lobe of the liver (series 2 image 109), unchanged.    CHEST WALL AND BONES: No aggressive osseous lesion. Bilateral   gynecomastia. Old fracture deformity of posterior lower ribs.    LOWER NECK: Within normal limits.    IMPRESSION:     No pneumonia.    Emphysema.                    ANA JONAS M.D., ATTENDING RADIOLOGIST  Thisdocument has been electronically signed. Dec  3 2019  1:32PM              < end of copied text >      RESPIRATORY CULTURES:          Studies  Chest X-RAY  CT SCAN Chest   Venous Dopplers: LE:   CT Abdomen  Others

## 2019-12-05 NOTE — PHYSICAL THERAPY INITIAL EVALUATION ADULT - GAIT DISTANCE, PT EVAL
100 ft (gait trial with no AD supervision progressing to independent) 100 ft (additional gait trial with no AD supervision progressing to independent)

## 2019-12-05 NOTE — PROGRESS NOTE ADULT - SUBJECTIVE AND OBJECTIVE BOX
Chief complaint  Patient is a 59y old  Male who presents with a chief complaint of SOB (05 Dec 2019 11:03)   Review of systems  Patient in bed, looks comfortable, no fever, no hypoglycemia.    Labs and Fingersticks  CAPILLARY BLOOD GLUCOSE      POCT Blood Glucose.: 372 mg/dL (05 Dec 2019 08:20)  POCT Blood Glucose.: 351 mg/dL (04 Dec 2019 22:18)  POCT Blood Glucose.: 260 mg/dL (04 Dec 2019 17:49)  POCT Blood Glucose.: 335 mg/dL (04 Dec 2019 12:04)      Anion Gap, Serum: 13 (12-04 @ 10:58)  Anion Gap, Serum: 13 (12-03 @ 12:37)    Hemoglobin A1C, Whole Blood: 10.4 <H> (12-04 @ 08:53)    Calcium, Total Serum: 9.1 (12-04 @ 10:58)  Calcium, Total Serum: 9.2 (12-03 @ 12:37)          12-04    134<L>  |  91<L>  |  21  ----------------------------<  416<H>  5.3   |  30  |  0.62    Ca    9.1      04 Dec 2019 10:58  Mg     2.3     12-04                          11.9   6.73  )-----------( 221      ( 04 Dec 2019 10:58 )             37.9     Medications  MEDICATIONS  (STANDING):  albuterol/ipratropium for Nebulization 3 milliLiter(s) Nebulizer every 6 hours  aspirin enteric coated 81 milliGRAM(s) Oral daily  atorvastatin 40 milliGRAM(s) Oral at bedtime  azithromycin   Tablet 250 milliGRAM(s) Oral daily  budesonide 160 MICROgram(s)/formoterol 4.5 MICROgram(s) Inhaler 2 Puff(s) Inhalation two times a day  chlorhexidine 4% Liquid 1 Application(s) Topical <User Schedule>  clopidogrel Tablet 75 milliGRAM(s) Oral daily  dextrose 5%. 1000 milliLiter(s) (50 mL/Hr) IV Continuous <Continuous>  dextrose 50% Injectable 12.5 Gram(s) IV Push once  dextrose 50% Injectable 25 Gram(s) IV Push once  dextrose 50% Injectable 25 Gram(s) IV Push once  enoxaparin Injectable 40 milliGRAM(s) SubCutaneous daily  famotidine    Tablet 20 milliGRAM(s) Oral daily  insulin glargine Injectable (LANTUS) 75 Unit(s) SubCutaneous at bedtime  insulin lispro (HumaLOG) corrective regimen sliding scale   SubCutaneous three times a day before meals  insulin lispro Injectable (HumaLOG) 36 Unit(s) SubCutaneous three times a day before meals  melatonin 5 milliGRAM(s) Oral at bedtime  methylPREDNISolone sodium succinate Injectable 20 milliGRAM(s) IV Push every 8 hours  nicotine -  14 mG/24Hr(s) Patch 1 patch Transdermal daily  oxycodone    5 mG/acetaminophen 325 mG 2 Tablet(s) Oral once  risperiDONE   Tablet 0.5 milliGRAM(s) Oral three times a day  sodium chloride 0.9%. 1000 milliLiter(s) (60 mL/Hr) IV Continuous <Continuous>  sodium zirconium cyclosilicate 10 Gram(s) Oral once      Physical Exam  General: Patient comfortable in bed  Vital Signs Last 12 Hrs  T(F): 97.5 (12-05-19 @ 04:59), Max: 97.5 (12-05-19 @ 04:59)  HR: 74 (12-05-19 @ 06:00) (74 - 92)  BP: 129/86 (12-05-19 @ 04:59) (129/86 - 129/86)  BP(mean): --  RR: 20 (12-05-19 @ 04:59) (20 - 20)  SpO2: 98% (12-05-19 @ 06:00) (96% - 98%)  Neck: No palpable thyroid nodules.  CVS: S1S2, No murmurs  Respiratory: No wheezing, no crepitations  GI: Abdomen soft, bowel sounds positive  Musculoskeletal:  edema lower extremities.   Skin: No skin rashes, no ecchymosis    Diagnostics Chief complaint  Patient is a 59y old  Male who presents with a chief complaint of SOB (05 Dec 2019 11:03)   Review of systems  Patient in bed, looks comfortable, no fever,  no hypoglycemia.    Labs and Fingersticks  CAPILLARY BLOOD GLUCOSE      POCT Blood Glucose.: 372 mg/dL (05 Dec 2019 08:20)  POCT Blood Glucose.: 351 mg/dL (04 Dec 2019 22:18)  POCT Blood Glucose.: 260 mg/dL (04 Dec 2019 17:49)  POCT Blood Glucose.: 335 mg/dL (04 Dec 2019 12:04)      Anion Gap, Serum: 13 (12-04 @ 10:58)  Anion Gap, Serum: 13 (12-03 @ 12:37)    Hemoglobin A1C, Whole Blood: 10.4 <H> (12-04 @ 08:53)    Calcium, Total Serum: 9.1 (12-04 @ 10:58)  Calcium, Total Serum: 9.2 (12-03 @ 12:37)          12-04    134<L>  |  91<L>  |  21  ----------------------------<  416<H>  5.3   |  30  |  0.62    Ca    9.1      04 Dec 2019 10:58  Mg     2.3     12-04                          11.9   6.73  )-----------( 221      ( 04 Dec 2019 10:58 )             37.9     Medications  MEDICATIONS  (STANDING):  albuterol/ipratropium for Nebulization 3 milliLiter(s) Nebulizer every 6 hours  aspirin enteric coated 81 milliGRAM(s) Oral daily  atorvastatin 40 milliGRAM(s) Oral at bedtime  azithromycin   Tablet 250 milliGRAM(s) Oral daily  budesonide 160 MICROgram(s)/formoterol 4.5 MICROgram(s) Inhaler 2 Puff(s) Inhalation two times a day  chlorhexidine 4% Liquid 1 Application(s) Topical <User Schedule>  clopidogrel Tablet 75 milliGRAM(s) Oral daily  dextrose 5%. 1000 milliLiter(s) (50 mL/Hr) IV Continuous <Continuous>  dextrose 50% Injectable 12.5 Gram(s) IV Push once  dextrose 50% Injectable 25 Gram(s) IV Push once  dextrose 50% Injectable 25 Gram(s) IV Push once  enoxaparin Injectable 40 milliGRAM(s) SubCutaneous daily  famotidine    Tablet 20 milliGRAM(s) Oral daily  insulin glargine Injectable (LANTUS) 75 Unit(s) SubCutaneous at bedtime  insulin lispro (HumaLOG) corrective regimen sliding scale   SubCutaneous three times a day before meals  insulin lispro Injectable (HumaLOG) 36 Unit(s) SubCutaneous three times a day before meals  melatonin 5 milliGRAM(s) Oral at bedtime  methylPREDNISolone sodium succinate Injectable 20 milliGRAM(s) IV Push every 8 hours  nicotine -  14 mG/24Hr(s) Patch 1 patch Transdermal daily  oxycodone    5 mG/acetaminophen 325 mG 2 Tablet(s) Oral once  risperiDONE   Tablet 0.5 milliGRAM(s) Oral three times a day  sodium chloride 0.9%. 1000 milliLiter(s) (60 mL/Hr) IV Continuous <Continuous>  sodium zirconium cyclosilicate 10 Gram(s) Oral once      Physical Exam  General: Patient comfortable in bed  Vital Signs Last 12 Hrs  T(F): 97.5 (12-05-19 @ 04:59), Max: 97.5 (12-05-19 @ 04:59)  HR: 74 (12-05-19 @ 06:00) (74 - 92)  BP: 129/86 (12-05-19 @ 04:59) (129/86 - 129/86)  BP(mean): --  RR: 20 (12-05-19 @ 04:59) (20 - 20)  SpO2: 98% (12-05-19 @ 06:00) (96% - 98%)  Neck: No palpable thyroid nodules.  CVS: S1S2, No murmurs  Respiratory: No wheezing, no crepitations  GI: Abdomen soft, bowel sounds positive  Musculoskeletal:  edema lower extremities.   Skin: No skin rashes, no ecchymosis    Diagnostics

## 2019-12-05 NOTE — PROGRESS NOTE ADULT - PROBLEM SELECTOR PLAN 1
Still feels sob: BUT NOT MUCH OF WHEEZING: He is an active smoker: Cont steroids as wellas BD and oxygen: HIS VENOUS ABG IS REVIEWED: HE LIKELY HAS CHR RETENTION AND MILD AC ON CHR HYPERCARBIC RESP FAILURE: WILL REPEAT HIS abg IN am: HE SEEMS COMFORTABLE: I DON'T THINK HE NEEDS BIPAP at this time  12/5: doing much better: change to po steroids and decrease the dose: no wheezing

## 2019-12-05 NOTE — PHYSICAL THERAPY INITIAL EVALUATION ADULT - ADDITIONAL COMMENTS
Pt lives at nursing home as LTC resident. PTA, pt independent, however pt reports he has gotten weaker recently since nursing home staff prefer pt to mobilize in w/c. Pt lives at [facility] as a LTC resident. PTA, pt independent, however pt reports he has gotten weaker recently since nursing home staff prefer pt to mobilize in w/c.

## 2019-12-05 NOTE — PROGRESS NOTE ADULT - SUBJECTIVE AND OBJECTIVE BOX
Patient is seen and examined at the bed side, is afebrile. He has no new complaints. The events are noted.        REVIEW OF SYSTEMS: All other review systems are negative          ALLERGIES: No Known Allergies      Vital Signs Last 24 Hrs  T(C): 36.3 (05 Dec 2019 21:29), Max: 36.6 (05 Dec 2019 13:55)  T(F): 97.4 (05 Dec 2019 21:29), Max: 97.8 (05 Dec 2019 13:55)  HR: 90 (05 Dec 2019 21:29) (74 - 106)  BP: 121/73 (05 Dec 2019 21:29) (118/81 - 129/86)  BP(mean): --  RR: 20 (05 Dec 2019 21:29) (20 - 20)  SpO2: 96% (05 Dec 2019 21:29) (93% - 98%)      PHYSICAL EXAM:  GENERAL: Not in distress, on oxygen  via NC  CHEST/LUNG:  Air entry bilaterally with No  wheezing today  HEART: s1 and s2 present  ABDOMEN:  Nontender and  Nondistended  EXTREMITIES: No pedal  edema  CNS: Awake and Alert        LABS: No new Labs                        11.9   6.73  )-----------( 221      ( 04 Dec 2019 10:58 )             37.9                           13.2   6.93  )-----------( 197      ( 03 Dec 2019 09:37 )             41.1         12-04    134<L>  |  91<L>  |  21  ----------------------------<  416<H>  5.3   |  30  |  0.62    Ca    9.1      04 Dec 2019 10:58  Mg     2.3     12-04      TPro  7.4  /  Alb  4.3  /  TBili  0.1<L>  /  DBili  x   /  AST  15  /  ALT  21  /  AlkPhos  80  12-02    12-03    129<L>  |  89<L>  |  22  ----------------------------<  397<H>  5.4<H>   |  27  |  0.65    Ca    9.2      03 Dec 2019 12:37    TPro  7.4  /  Alb  4.3  /  TBili  0.1<L>  /  DBili  x   /  AST  15  /  ALT  21  /  AlkPhos  80  12-02    PT/INR - ( 02 Dec 2019 23:50 )   PT: 11.5 sec;   INR: 1.00 ratio     PTT - ( 02 Dec 2019 23:50 )  PTT:30.5 sec        CAPILLARY BLOOD GLUCOSE  POCT Blood Glucose.: 342 mg/dL (03 Dec 2019 18:44)  POCT Blood Glucose.: 316 mg/dL (03 Dec 2019 13:12)  POCT Blood Glucose.: 455 mg/dL (03 Dec 2019 09:19)  POCT Blood Glucose.: 463 mg/dL (03 Dec 2019 09:18)        MEDICATIONS  (STANDING):  albuterol/ipratropium for Nebulization 3 milliLiter(s) Nebulizer every 6 hours  aspirin enteric coated 81 milliGRAM(s) Oral daily  atorvastatin 40 milliGRAM(s) Oral at bedtime  azithromycin   Tablet 250 milliGRAM(s) Oral daily  budesonide 160 MICROgram(s)/formoterol 4.5 MICROgram(s) Inhaler 2 Puff(s) Inhalation two times a day  chlorhexidine 4% Liquid 1 Application(s) Topical <User Schedule>  clopidogrel Tablet 75 milliGRAM(s) Oral daily  dextrose 5%. 1000 milliLiter(s) (50 mL/Hr) IV Continuous <Continuous>  dextrose 50% Injectable 12.5 Gram(s) IV Push once  dextrose 50% Injectable 25 Gram(s) IV Push once  dextrose 50% Injectable 25 Gram(s) IV Push once  enoxaparin Injectable 40 milliGRAM(s) SubCutaneous daily  famotidine    Tablet 20 milliGRAM(s) Oral daily  insulin glargine Injectable (LANTUS) 75 Unit(s) SubCutaneous at bedtime  insulin lispro (HumaLOG) corrective regimen sliding scale   SubCutaneous three times a day before meals  insulin lispro Injectable (HumaLOG) 36 Unit(s) SubCutaneous three times a day before meals  melatonin 5 milliGRAM(s) Oral at bedtime  nicotine -  14 mG/24Hr(s) Patch 1 patch Transdermal daily  predniSONE   Tablet 20 milliGRAM(s) Oral every 12 hours  risperiDONE   Tablet 0.5 milliGRAM(s) Oral three times a day  sodium chloride 0.9%. 1000 milliLiter(s) (60 mL/Hr) IV Continuous <Continuous>    MEDICATIONS  (PRN):  acetaminophen   Tablet .. 1000 milliGRAM(s) Oral every 8 hours PRN Moderate Pain (4 - 6)  dextrose 40% Gel 15 Gram(s) Oral once PRN Blood Glucose LESS THAN 70 milliGRAM(s)/deciliter  glucagon  Injectable 1 milliGRAM(s) IntraMuscular once PRN Glucose LESS THAN 70 milligrams/deciliter  ibuprofen  Tablet. 800 milliGRAM(s) Oral three times a day PRN Severe Pain (7 - 10)  lidocaine   Patch 1 Patch Transdermal daily PRN back pain  oxycodone    5 mG/acetaminophen 325 mG 2 Tablet(s) Oral every 6 hours PRN Severe Pain (7 - 10)          RADIOLOGY & ADDITIONAL TESTS:    12/3/19 : Xray Chest 1 View AP/PA (12.03.19 @ 00:41) Questionable small left pleural effusion..Prominent pulmonary artery,  which can be seen in pulmonary arterial hypertension.      12/3/19  CT Chest No Cont (12.03.19 @ 12:58) AIRWAYS, LUNGS and PLEURA: Patent central airways. Mild bronchial wall   thickening. No bronchiectasis. Moderate upper lung predominant emphysema.  Mild bandlike atelectasis within the right middle lobe and lingula.  Otherwise, the lungs are clear without consolidation.  No pleural effusion.      MICROBIOLOGY DATA:      Rapid Respiratory Viral Panel (12.02.19 @ 23:50)    Rapid RVP Result: Detected: This Respiratory Panel uses polymerase chain reaction (PCR) to detect for  adenovirus; coronavirus (HKU1, NL63, 229E, OC43); human metapneumovirus  (hMPV); human enterovirus/rhinovirus (Entero/RV); influenza A; influenza  A/H1; influenza A/H3; influenza A/H1-2009; influenza B; parainfluenza  viruses 1, 2, 3, 4; respiratory syncytial virus; Mycoplasma pneumoniae;  and Chlamydophila pneumoniae.

## 2019-12-05 NOTE — PROGRESS NOTE ADULT - SUBJECTIVE AND OBJECTIVE BOX
[facility]   MD Chelsea Burroughs D.O. Fatima Sheikh, D.O. Ruoru Wong, PA    From 7 AM - 5 PM:  OFFICE: [phone]  Dr. Juarez cell: [phone]  Dr. Daniel cell: [phone]  Dr. Kwong cell: [phone]  ALVARO Spencer cell: [phone]    From 5 PM - 7 AM: Answering Service: [phone]      RENAL FOLLOW UP NOTE  --------------------------------------------------------------------------------  HPI: Pt seen and examined at bedside.   Denies SOB, chest pain     PAST HISTORY  --------------------------------------------------------------------------------  No significant changes to PMH, PSH, FHx, SHx, unless otherwise noted    ALLERGIES & MEDICATIONS  --------------------------------------------------------------------------------  Allergies    No Known Allergies    Intolerances      Standing Inpatient Medications  albuterol/ipratropium for Nebulization 3 milliLiter(s) Nebulizer every 6 hours  aspirin enteric coated 81 milliGRAM(s) Oral daily  atorvastatin 40 milliGRAM(s) Oral at bedtime  azithromycin   Tablet 250 milliGRAM(s) Oral daily  budesonide 160 MICROgram(s)/formoterol 4.5 MICROgram(s) Inhaler 2 Puff(s) Inhalation two times a day  chlorhexidine 4% Liquid 1 Application(s) Topical <User Schedule>  clopidogrel Tablet 75 milliGRAM(s) Oral daily  dextrose 5%. 1000 milliLiter(s) IV Continuous <Continuous>  dextrose 50% Injectable 12.5 Gram(s) IV Push once  dextrose 50% Injectable 25 Gram(s) IV Push once  dextrose 50% Injectable 25 Gram(s) IV Push once  enoxaparin Injectable 40 milliGRAM(s) SubCutaneous daily  famotidine    Tablet 20 milliGRAM(s) Oral daily  insulin glargine Injectable (LANTUS) 78 Unit(s) SubCutaneous every morning  insulin glargine Injectable (LANTUS) 65 Unit(s) SubCutaneous at bedtime  insulin lispro (HumaLOG) corrective regimen sliding scale   SubCutaneous three times a day before meals  insulin lispro Injectable (HumaLOG) 36 Unit(s) SubCutaneous three times a day before meals  melatonin 5 milliGRAM(s) Oral at bedtime  methylPREDNISolone sodium succinate Injectable 20 milliGRAM(s) IV Push every 8 hours  nicotine -  14 mG/24Hr(s) Patch 1 patch Transdermal daily  oxycodone    5 mG/acetaminophen 325 mG 2 Tablet(s) Oral once  risperiDONE   Tablet 0.5 milliGRAM(s) Oral three times a day  sodium chloride 0.9%. 1000 milliLiter(s) IV Continuous <Continuous>    PRN Inpatient Medications  acetaminophen   Tablet .. 1000 milliGRAM(s) Oral every 8 hours PRN  dextrose 40% Gel 15 Gram(s) Oral once PRN  glucagon  Injectable 1 milliGRAM(s) IntraMuscular once PRN  ibuprofen  Tablet. 800 milliGRAM(s) Oral three times a day PRN  lidocaine   Patch 1 Patch Transdermal daily PRN  oxycodone    5 mG/acetaminophen 325 mG 2 Tablet(s) Oral every 6 hours PRN      REVIEW OF SYSTEMS  --------------------------------------------------------------------------------  General: no fever  CVS: no chest pain  RESP: no sob, no cough  ABD: no abdominal pain  : no dysuria  MSK: no edema     VITALS/PHYSICAL EXAM  --------------------------------------------------------------------------------  T(C): 36.4 (12-05-19 @ 04:59), Max: 36.9 (12-04-19 @ 20:46)  HR: 74 (12-05-19 @ 06:00) (74 - 103)  BP: 129/86 (12-05-19 @ 04:59) (108/71 - 129/86)  RR: 20 (12-05-19 @ 04:59) (20 - 20)  SpO2: 98% (12-05-19 @ 06:00) (96% - 98%)  Wt(kg): --        12-04-19 @ 07:01  -  12-05-19 @ 07:00  --------------------------------------------------------  IN: 3200 mL / OUT: 1300 mL / NET: 1900 mL      Physical Exam:  	Gen: NAD  	HEENT: MMM  	Pulm: CTA B/L  	CV: S1S2  	Abd: Soft, +BS  	Ext: No LE edema B/L                      Neuro: Awake   	Skin: Warm and Dry       LABS/STUDIES  --------------------------------------------------------------------------------              11.9   6.73  >-----------<  221      [12-04-19 @ 10:58]              37.9     134  |  91  |  21  ----------------------------<  416      [12-04-19 @ 10:58]  5.3   |  30  |  0.62        Ca     9.1     [12-04-19 @ 10:58]      Mg     2.3     [12-04-19 @ 10:58]    Serum Osmolality 303      [12-04-19 @ 15:49]    Creatinine Trend:  SCr 0.62 [12-04 @ 10:58]  SCr 0.65 [12-03 @ 12:37]  SCr 0.63 [12-03 @ 09:37]  SCr 0.73 [12-03 @ 02:00]  SCr 0.86 [12-02 @ 23:50]      Urine Sodium <35      [12-04-19 @ 22:03]  Urine Osmolality 489      [12-04-19 @ 22:24]    HbA1c 10.4      [12-04-19 @ 08:53]

## 2019-12-05 NOTE — PROGRESS NOTE ADULT - PROBLEM SELECTOR PLAN 2
- ivf, humalog premeal, c/w lantus, ihss, monitor acks and adjust rx prn  dm education, lifestyle modifications  should improve as steroids are tapered off

## 2019-12-05 NOTE — PROGRESS NOTE ADULT - ASSESSMENT
Assessment  DMT2: 59y Male with DM T2 with hyperglycemia, A1C 10.4%, was on insulin at home, now on high-dose basal bolus insulin, blood sugars still running high in the setting of steroid management, FS consistently in the 200-300s, no hypoglycemic episode, patient is eating meals, comfortable.  COPD Exacerbation: on medications and nebulizer treatment, stable, monitored.  HTN: Controlled,  on antihypertensive medications.  HLD: Controlled, on statin.  Schizophrenia: On meds.  Obesity: No strict exercise routines, not on any weight loss program, neither on low calorie diet.          Laury Romero MD  Cell: [phone]  Office: [phone] Assessment  DMT2: 59y Male with DM T2 with hyperglycemia, A1C 10.4%, was on insulin at home, now on high-dose basal bolus insulin, blood sugars still running high in the setting of steroid management,  FS consistently in the 200-300s, no hypoglycemic episode, patient is eating meals, comfortable.  COPD Exacerbation: on medications and nebulizer treatment, stable, monitored.  HTN: Controlled,  on antihypertensive medications.  HLD: Controlled, on statin.  Schizophrenia: On meds.  Obesity: No strict exercise routines, not on any weight loss program, neither on low calorie diet.          Laury Romero MD  Cell: [phone]  Office: [phone]

## 2019-12-05 NOTE — PROVIDER CONTACT NOTE (OTHER) - RECOMMENDATIONS
Patient instructed to call before getting up patient agrees to do so. But refuses bed alarm and chair alarm  note refusal

## 2019-12-06 DIAGNOSIS — G89.29 OTHER CHRONIC PAIN: ICD-10-CM

## 2019-12-06 DIAGNOSIS — F41.0 PANIC DISORDER [EPISODIC PAROXYSMAL ANXIETY]: ICD-10-CM

## 2019-12-06 LAB
BASE EXCESS BLDA CALC-SCNC: 3.3 MMOL/L — HIGH (ref -2–2)
CO2 BLDA-SCNC: 31 MMOL/L — HIGH (ref 22–30)
GAS PNL BLDA: SIGNIFICANT CHANGE UP
GLUCOSE BLDC GLUCOMTR-MCNC: 152 MG/DL — HIGH (ref 70–99)
GLUCOSE BLDC GLUCOMTR-MCNC: 169 MG/DL — HIGH (ref 70–99)
GLUCOSE BLDC GLUCOMTR-MCNC: 224 MG/DL — HIGH (ref 70–99)
HCO3 BLDA-SCNC: 30 MMOL/L — HIGH (ref 21–29)
HOROWITZ INDEX BLDA+IHG-RTO: 32 — SIGNIFICANT CHANGE UP
PCO2 BLDA: 56 MMHG — HIGH (ref 32–46)
PH BLDA: 7.34 — LOW (ref 7.35–7.45)
PO2 BLDA: 168 MMHG — HIGH (ref 74–108)
SAO2 % BLDA: 99 % — HIGH (ref 92–96)

## 2019-12-06 RX ORDER — ALPRAZOLAM 0.25 MG
0.5 TABLET ORAL
Refills: 0 | Status: DISCONTINUED | OUTPATIENT
Start: 2019-12-06 | End: 2019-12-08

## 2019-12-06 RX ADMIN — INSULIN GLARGINE 80 UNIT(S): 100 INJECTION, SOLUTION SUBCUTANEOUS at 09:12

## 2019-12-06 RX ADMIN — OXYCODONE AND ACETAMINOPHEN 2 TABLET(S): 5; 325 TABLET ORAL at 13:20

## 2019-12-06 RX ADMIN — Medication 1 PATCH: at 18:34

## 2019-12-06 RX ADMIN — Medication 36 UNIT(S): at 09:13

## 2019-12-06 RX ADMIN — CHLORHEXIDINE GLUCONATE 1 APPLICATION(S): 213 SOLUTION TOPICAL at 06:07

## 2019-12-06 RX ADMIN — OXYCODONE AND ACETAMINOPHEN 2 TABLET(S): 5; 325 TABLET ORAL at 19:54

## 2019-12-06 RX ADMIN — Medication 1 PATCH: at 06:10

## 2019-12-06 RX ADMIN — OXYCODONE AND ACETAMINOPHEN 2 TABLET(S): 5; 325 TABLET ORAL at 20:24

## 2019-12-06 RX ADMIN — Medication 1000 MILLIGRAM(S): at 22:33

## 2019-12-06 RX ADMIN — AZITHROMYCIN 250 MILLIGRAM(S): 500 TABLET, FILM COATED ORAL at 13:22

## 2019-12-06 RX ADMIN — Medication 0.5 MILLIGRAM(S): at 10:56

## 2019-12-06 RX ADMIN — Medication 3 MILLILITER(S): at 12:32

## 2019-12-06 RX ADMIN — RISPERIDONE 0.5 MILLIGRAM(S): 4 TABLET ORAL at 06:08

## 2019-12-06 RX ADMIN — OXYCODONE AND ACETAMINOPHEN 2 TABLET(S): 5; 325 TABLET ORAL at 06:07

## 2019-12-06 RX ADMIN — INSULIN GLARGINE 75 UNIT(S): 100 INJECTION, SOLUTION SUBCUTANEOUS at 22:35

## 2019-12-06 RX ADMIN — Medication 81 MILLIGRAM(S): at 13:22

## 2019-12-06 RX ADMIN — ATORVASTATIN CALCIUM 40 MILLIGRAM(S): 80 TABLET, FILM COATED ORAL at 22:34

## 2019-12-06 RX ADMIN — RISPERIDONE 0.5 MILLIGRAM(S): 4 TABLET ORAL at 22:34

## 2019-12-06 RX ADMIN — Medication 20 MILLIGRAM(S): at 06:07

## 2019-12-06 RX ADMIN — Medication 20 MILLIGRAM(S): at 17:12

## 2019-12-06 RX ADMIN — BUDESONIDE AND FORMOTEROL FUMARATE DIHYDRATE 2 PUFF(S): 160; 4.5 AEROSOL RESPIRATORY (INHALATION) at 17:50

## 2019-12-06 RX ADMIN — OXYCODONE AND ACETAMINOPHEN 2 TABLET(S): 5; 325 TABLET ORAL at 14:00

## 2019-12-06 RX ADMIN — Medication 1 PATCH: at 13:28

## 2019-12-06 RX ADMIN — RISPERIDONE 0.5 MILLIGRAM(S): 4 TABLET ORAL at 13:21

## 2019-12-06 RX ADMIN — Medication 0.5 MILLIGRAM(S): at 22:35

## 2019-12-06 RX ADMIN — Medication 36 UNIT(S): at 17:12

## 2019-12-06 RX ADMIN — Medication 2: at 09:13

## 2019-12-06 RX ADMIN — FAMOTIDINE 20 MILLIGRAM(S): 10 INJECTION INTRAVENOUS at 13:21

## 2019-12-06 RX ADMIN — Medication 3 MILLILITER(S): at 17:50

## 2019-12-06 RX ADMIN — BUDESONIDE AND FORMOTEROL FUMARATE DIHYDRATE 2 PUFF(S): 160; 4.5 AEROSOL RESPIRATORY (INHALATION) at 05:35

## 2019-12-06 RX ADMIN — CLOPIDOGREL BISULFATE 75 MILLIGRAM(S): 75 TABLET, FILM COATED ORAL at 13:21

## 2019-12-06 RX ADMIN — OXYCODONE AND ACETAMINOPHEN 2 TABLET(S): 5; 325 TABLET ORAL at 06:30

## 2019-12-06 RX ADMIN — Medication 3 MILLILITER(S): at 05:35

## 2019-12-06 RX ADMIN — Medication 36 UNIT(S): at 14:24

## 2019-12-06 RX ADMIN — Medication 1 PATCH: at 07:41

## 2019-12-06 RX ADMIN — ENOXAPARIN SODIUM 40 MILLIGRAM(S): 100 INJECTION SUBCUTANEOUS at 13:21

## 2019-12-06 RX ADMIN — Medication 1: at 14:24

## 2019-12-06 RX ADMIN — Medication 1000 MILLIGRAM(S): at 09:15

## 2019-12-06 RX ADMIN — Medication 1 PATCH: at 13:20

## 2019-12-06 RX ADMIN — Medication 1000 MILLIGRAM(S): at 08:44

## 2019-12-06 RX ADMIN — Medication 3 MILLILITER(S): at 23:36

## 2019-12-06 RX ADMIN — Medication 1000 MILLIGRAM(S): at 23:03

## 2019-12-06 RX ADMIN — Medication 5 MILLIGRAM(S): at 22:34

## 2019-12-06 NOTE — PROGRESS NOTE ADULT - ASSESSMENT
59 year-old male with history of COPD on 2L NC @ home, pulmonary HTN, CAD and stents, IDDM2, paranoid schizophrenia, anxietyp/w worsening SOB and cough for 1 week from long term NH.  He has been there for the past 8 months and states that he was not getting his nebs ATC and sometimes even PRN, thinks that exacerbated his SOB/COPD. Nephrology consulted for electrolyte abnormality.    Hyponatremia  - in the setting of elevated glucose  - elevated serum osm likely from high glucose  - serum sodium Improved with IVF  - urine sodium low   - optimized DM control   - Avoid over correction of 8- 10 meq in 24 hours   - Monitor Na level daily    Hyperkalemia   Pt with elevated serum potassium  Improving   s/p lokelma   in the setting of high glucose likely from shifting  recommend lokelma 10mg daily as needed for high potassium   Optimized DM control    HTN  BP acceptable

## 2019-12-06 NOTE — DIETITIAN INITIAL EVALUATION ADULT. - REASON INDICATOR FOR ASSESSMENT
Nutrition consult received for education for A1c 10.4%  Source: Pt, EMR  Per chart, 58 y/o male Nutrition consult received for education for A1c 10.4%  Source: Pt, EMR  Per chart, 58 y/o male p/w worsening SOB and cough for 1 week from long term NH a/w acute COPD exacerbation due to RSV respiratory infection. Followed by endocrinologist  PMH: COPD on 2L NC @ home, pulmonary HTN, CAD and stents, IDDM2, paranoid schizophrenia, anxiety

## 2019-12-06 NOTE — PROGRESS NOTE ADULT - PROBLEM SELECTOR PLAN 1
Will continue current insulin regimen, continue monitoring FS and FU.  Patient counseled for compliance with consistent low carb diet and exercise as tolerated outpatient. Patient counseled for compliance with consistent low carb diet and exercise as tolerated outpatient.

## 2019-12-06 NOTE — PROGRESS NOTE ADULT - ASSESSMENT
Assessment  DMT2: 59y Male with DM T2 with hyperglycemia, A1C 10.4%, was on insulin at home, now on high-dose basal bolus insulin, blood sugars improving, steroid dose decreased, no hypoglycemic episode, patient is eating meals, comfortable, states he feels better with the nebulizer tx.  COPD Exacerbation: on medications and nebulizer treatment, stable, monitored.  HTN: Controlled,  on antihypertensive medications.  HLD: Controlled, on statin.  Schizophrenia: On meds.  Obesity: No strict exercise routines, not on any weight loss program, neither on low calorie diet.          Laury Romero MD  Cell: [phone]  Office: [phone] Assessment  DMT2: 59y Male with DM T2 with hyperglycemia, A1C 10.4%, was on insulin at home, now on high-dose basal bolus insulin, blood sugars improving, steroid dose decreased, no hypoglycemic episode, patient is eating meals,  comfortable, states he feels better with the nebulizer tx.  COPD Exacerbation: on medications and nebulizer treatment, stable, monitored.  HTN: Controlled,  on antihypertensive medications.  HLD: Controlled, on statin.  Schizophrenia: On meds.  Obesity: No strict exercise routines, not on any weight loss program, neither on low calorie diet.          Laury Romero MD  Cell: [phone]  Office: [phone]

## 2019-12-06 NOTE — DIETITIAN INITIAL EVALUATION ADULT. - OTHER INFO
Intake PTA: Pt resides in assisted living facility. Per diet recall, pt is noncompliant with diabetic nutrition recommendations. Pt unsure of goal BG ranges, A1c is 10.4% (10/4) indicating poor control. Note that pt is prescribed prednisone contributing to elevated BG.    Confirms NKFA, no nausea/vomiting, no difficulty chewing/swallowing, no GI distress, no micronutrient supplementation PTA, last BM today.    Diet: good po intake in house    Education: The following were discussed with the pt: carbohydrate sources, pairing carbohydrates with proteins at meals, protein sources, portion sizes, balanced plate, consistent carbohydrate intake, carb counting, relationship of carbs and blood sugar, necessary diet modifications, low-sodium recommendations. Written education also provided. Pt acknowledges he is aware he needs to monitor blood sugars especially in setting of steroids.    Weight Hx: Endorses 30 pound wt gain since residing in assisted living. , current wt 223 pounds (12/4)

## 2019-12-06 NOTE — DIETITIAN INITIAL EVALUATION ADULT. - ADD RECOMMEND
1) Continue current diet 2) Pt aware RD remains available for any concerns, questions or diet preferences

## 2019-12-06 NOTE — DIETITIAN INITIAL EVALUATION ADULT. - PROBLEM SELECTOR PLAN 10
Documented in NH documentation as "active" MOLTS in place with DNR/DNI.  I personally spent 20 min face-to-face time discussing advance directives including but not limited to DNR/DNI, and plan of care with patient.  Patient states he is FULL CODE and his HCP is sister (Chiquita Stanley [phone]).

## 2019-12-06 NOTE — PROGRESS NOTE ADULT - PROBLEM SELECTOR PLAN 2
secondary to steroids: cotn insulin  12/5: cont to control per endo  12/*6: monitor and control per endo

## 2019-12-06 NOTE — PROGRESS NOTE ADULT - PROBLEM SELECTOR PLAN 1
Still feels sob: BUT NOT MUCH OF WHEEZING: He is an active smoker: Cont steroids as wellas BD and oxygen: HIS VENOUS ABG IS REVIEWED: HE LIKELY HAS CHR RETENTION AND MILD AC ON CHR HYPERCARBIC RESP FAILURE: WILL REPEAT HIS abg IN am: HE SEEMS COMFORTABLE: I DON'T THINK HE NEEDS BIPAP at this time  12/5: doing much better: change to po steroids and decrease the dose: no wheezing  12/6: Mild ac on chr hypercarbic resp failure: secondary to copd exacerbation: doing pretty well: alert and awake: no need for bipap

## 2019-12-06 NOTE — PROGRESS NOTE ADULT - PROBLEM SELECTOR PLAN 1
- improving accordingly. On PO Prednisone. C/w PO prednisone with slow taper.  Pt is medically cleared and optimized for safe discharge. Case management for arranging safe discharge   All team members management and pt care appreciated

## 2019-12-06 NOTE — PROGRESS NOTE ADULT - SUBJECTIVE AND OBJECTIVE BOX
Patient is a 59y old  Male who presents with a chief complaint of SOB (06 Dec 2019 09:43)      Any change in ROS: Doing ok :no SOB     MEDICATIONS  (STANDING):  albuterol/ipratropium for Nebulization 3 milliLiter(s) Nebulizer every 6 hours  aspirin enteric coated 81 milliGRAM(s) Oral daily  atorvastatin 40 milliGRAM(s) Oral at bedtime  azithromycin   Tablet 250 milliGRAM(s) Oral daily  budesonide 160 MICROgram(s)/formoterol 4.5 MICROgram(s) Inhaler 2 Puff(s) Inhalation two times a day  chlorhexidine 4% Liquid 1 Application(s) Topical <User Schedule>  clopidogrel Tablet 75 milliGRAM(s) Oral daily  dextrose 5%. 1000 milliLiter(s) (50 mL/Hr) IV Continuous <Continuous>  dextrose 50% Injectable 12.5 Gram(s) IV Push once  dextrose 50% Injectable 25 Gram(s) IV Push once  dextrose 50% Injectable 25 Gram(s) IV Push once  enoxaparin Injectable 40 milliGRAM(s) SubCutaneous daily  famotidine    Tablet 20 milliGRAM(s) Oral daily  insulin glargine Injectable (LANTUS) 75 Unit(s) SubCutaneous at bedtime  insulin glargine Injectable (LANTUS) 80 Unit(s) SubCutaneous every morning  insulin lispro (HumaLOG) corrective regimen sliding scale   SubCutaneous three times a day before meals  insulin lispro Injectable (HumaLOG) 36 Unit(s) SubCutaneous three times a day before meals  melatonin 5 milliGRAM(s) Oral at bedtime  nicotine -  14 mG/24Hr(s) Patch 1 patch Transdermal daily  predniSONE   Tablet 20 milliGRAM(s) Oral every 12 hours  risperiDONE   Tablet 0.5 milliGRAM(s) Oral three times a day  sodium chloride 0.9%. 1000 milliLiter(s) (60 mL/Hr) IV Continuous <Continuous>    MEDICATIONS  (PRN):  acetaminophen   Tablet .. 1000 milliGRAM(s) Oral every 8 hours PRN Moderate Pain (4 - 6)  ALPRAZolam 0.5 milliGRAM(s) Oral two times a day PRN panic attack  dextrose 40% Gel 15 Gram(s) Oral once PRN Blood Glucose LESS THAN 70 milliGRAM(s)/deciliter  glucagon  Injectable 1 milliGRAM(s) IntraMuscular once PRN Glucose LESS THAN 70 milligrams/deciliter  ibuprofen  Tablet. 800 milliGRAM(s) Oral three times a day PRN Severe Pain (7 - 10)  lidocaine   Patch 1 Patch Transdermal daily PRN back pain  oxycodone    5 mG/acetaminophen 325 mG 2 Tablet(s) Oral every 6 hours PRN Severe Pain (7 - 10)    Vital Signs Last 24 Hrs  T(C): 36.4 (06 Dec 2019 05:00), Max: 36.4 (06 Dec 2019 05:00)  T(F): 97.5 (06 Dec 2019 05:00), Max: 97.5 (06 Dec 2019 05:00)  HR: 75 (06 Dec 2019 12:32) (73 - 106)  BP: 129/85 (06 Dec 2019 05:00) (118/81 - 129/85)  BP(mean): --  RR: 20 (06 Dec 2019 05:00) (20 - 20)  SpO2: 97% (06 Dec 2019 12:32) (93% - 98%)    I&O's Summary    05 Dec 2019 07:01  -  06 Dec 2019 07:00  --------------------------------------------------------  IN: 300 mL / OUT: 1200 mL / NET: -900 mL          Physical Exam:   GENERAL: NAD, well-groomed, well-developed  HEENT: GALO/   Atraumatic, Normocephalic  ENMT: No tonsillar erythema, exudates, or enlargement; Moist mucous membranes, Good dentition, No lesions  NECK: Supple, No JVD, Normal thyroid  CHEST/LUNG: Clear to auscultaion, ; No rales, rhonchi, wheezing, or rubs  CVS: Regular rate and rhythm; No murmurs, rubs, or gallops  GI: : Soft, Nontender, Nondistended; Bowel sounds present  NERVOUS SYSTEM:  Alert & Oriented X3  EXTREMITIES:  2+ Peripheral Pulses, No clubbing, cyanosis, or edema  LYMPH: No lymphadenopathy noted  SKIN: No rashes or lesions  ENDOCRINOLOGY: No Thyromegaly  PSYCH: Appropriate    Labs:  ABG - ( 06 Dec 2019 07:26 )  pH, Arterial: 7.34  pH, Blood: x     /  pCO2: 56    /  pO2: 168   / HCO3: 30    / Base Excess: 3.3   /  SaO2: 99              33                            11.9   6.73  )-----------( 221      ( 04 Dec 2019 10:58 )             37.9                         13.2   6.93  )-----------( 197      ( 03 Dec 2019 09:37 )             41.1                         13.3   5.83  )-----------( 186      ( 02 Dec 2019 23:50 )             41.6     12-04    134<L>  |  91<L>  |  21  ----------------------------<  416<H>  5.3   |  30  |  0.62  12-03    129<L>  |  89<L>  |  22  ----------------------------<  397<H>  5.4<H>   |  27  |  0.65  12-03    127<L>  |  89<L>  |  20  ----------------------------<  502<HH>  6.2<HH>   |  24  |  0.63  12-03    131<L>  |  94<L>  |  17  ----------------------------<  359<H>  5.5<H>   |  24  |  0.73  12-02    133<L>  |  92<L>  |  16  ----------------------------<  321<H>  5.4<H>   |  28  |  0.86      TPro  7.4  /  Alb  4.3  /  TBili  0.1<L>  /  DBili  x   /  AST  15  /  ALT  21  /  AlkPhos  80  12-02    CAPILLARY BLOOD GLUCOSE      POCT Blood Glucose.: 224 mg/dL (06 Dec 2019 08:53)  POCT Blood Glucose.: 389 mg/dL (05 Dec 2019 21:25)  POCT Blood Glucose.: 181 mg/dL (05 Dec 2019 17:44)        < from: CT Chest No Cont (12.03.19 @ 12:58) >  Sagittal and coronal reformats were performed.      FINDINGS:    AIRWAYS, LUNGS and PLEURA: Patent central airways. Mild bronchial wall   thickening. No bronchiectasis. Moderate upper lung predominant emphysema.   Mild bandlike atelectasis within the right middle lobe and lingula.   Otherwise, the lungs are clear without consolidation.    No pleural effusion.    MEDIASTINUM AND [place]: No lymphadenopathy.    HEART AND VESSELS: Heart size is normal. No pericardial effusion.   Thoracic aorta and pulmonary artery are normal in diameter. Moderate   calcific coronary atherosclerosis.    VISUALIZED UPPER ABDOMEN: Too small to characterize hypodensity within   the right lobe of the liver (series 2 image 109), unchanged.    CHEST WALL AND BONES: No aggressive osseous lesion. Bilateral   gynecomastia. Old fracture deformity of posterior lower ribs.    LOWER NECK: Within normal limits.    IMPRESSION:     No pneumonia.    Emphysema.                    ANA JONAS M.D., ATTENDING RADIOLOGIST  Thisdocument has been electronically signed. Dec  3 2019  1:32PM        < end of copied text >      Procalcitonin, Serum: 0.08 ng/mL (12-04 @ 07:18)        RECENT CULTURES:        RESPIRATORY CULTURES:          Studies  Chest X-RAY  CT SCAN Chest   Venous Dopplers: LE:   CT Abdomen  Others

## 2019-12-06 NOTE — DIETITIAN INITIAL EVALUATION ADULT. - PROBLEM SELECTOR PLAN 3
- will treat with fluid, insulin (hyperglycemia) and Nebs ATC  - bladder scan to r/o obstruction  - will repeat K in am

## 2019-12-06 NOTE — DIETITIAN INITIAL EVALUATION ADULT. - ENERGY NEEDS
Ht: 67 inches Wt: 223 pounds BMI: 34.9 kg/m2 IBW: 148 pounds(+/-10%) 151%IBW  1+ R/L ankle edema. no pressure ulcers documented.

## 2019-12-06 NOTE — PROGRESS NOTE ADULT - SUBJECTIVE AND OBJECTIVE BOX
Chief complaint  Patient is a 59y old  Male who presents with a chief complaint of SOB (06 Dec 2019 13:58)   Review of systems  Patient in bed, looks comfortable, no fever, no hypoglycemia.    Labs and Fingersticks  CAPILLARY BLOOD GLUCOSE      POCT Blood Glucose.: 152 mg/dL (06 Dec 2019 14:10)  POCT Blood Glucose.: 224 mg/dL (06 Dec 2019 08:53)  POCT Blood Glucose.: 389 mg/dL (05 Dec 2019 21:25)  POCT Blood Glucose.: 181 mg/dL (05 Dec 2019 17:44)                        Medications  MEDICATIONS  (STANDING):  albuterol/ipratropium for Nebulization 3 milliLiter(s) Nebulizer every 6 hours  aspirin enteric coated 81 milliGRAM(s) Oral daily  atorvastatin 40 milliGRAM(s) Oral at bedtime  azithromycin   Tablet 250 milliGRAM(s) Oral daily  budesonide 160 MICROgram(s)/formoterol 4.5 MICROgram(s) Inhaler 2 Puff(s) Inhalation two times a day  chlorhexidine 4% Liquid 1 Application(s) Topical <User Schedule>  clopidogrel Tablet 75 milliGRAM(s) Oral daily  dextrose 5%. 1000 milliLiter(s) (50 mL/Hr) IV Continuous <Continuous>  dextrose 50% Injectable 12.5 Gram(s) IV Push once  dextrose 50% Injectable 25 Gram(s) IV Push once  dextrose 50% Injectable 25 Gram(s) IV Push once  enoxaparin Injectable 40 milliGRAM(s) SubCutaneous daily  famotidine    Tablet 20 milliGRAM(s) Oral daily  insulin glargine Injectable (LANTUS) 75 Unit(s) SubCutaneous at bedtime  insulin glargine Injectable (LANTUS) 80 Unit(s) SubCutaneous every morning  insulin lispro (HumaLOG) corrective regimen sliding scale   SubCutaneous three times a day before meals  insulin lispro Injectable (HumaLOG) 36 Unit(s) SubCutaneous three times a day before meals  melatonin 5 milliGRAM(s) Oral at bedtime  nicotine -  14 mG/24Hr(s) Patch 1 patch Transdermal daily  predniSONE   Tablet 20 milliGRAM(s) Oral every 12 hours  risperiDONE   Tablet 0.5 milliGRAM(s) Oral three times a day  sodium chloride 0.9%. 1000 milliLiter(s) (60 mL/Hr) IV Continuous <Continuous>      Physical Exam  General: Patient comfortable in bed  Vital Signs Last 12 Hrs  T(F): 97.2 (12-06-19 @ 14:14), Max: 97.5 (12-06-19 @ 05:00)  HR: 99 (12-06-19 @ 14:14) (73 - 99)  BP: 142/82 (12-06-19 @ 14:14) (129/85 - 142/82)  BP(mean): --  RR: 20 (12-06-19 @ 14:14) (20 - 20)  SpO2: 95% (12-06-19 @ 14:14) (95% - 97%)  Neck: No palpable thyroid nodules.  CVS: S1S2, No murmurs  Respiratory: No wheezing, no crepitations  GI: Abdomen soft, bowel sounds positive  Musculoskeletal:  edema lower extremities.   Skin: No skin rashes, no ecchymosis    Diagnostics Chief complaint  Patient is a 59y old  Male who presents with a chief complaint of SOB (06 Dec 2019 13:58)   Review of systems  Patient in bed, looks comfortable, no fever, no  hypoglycemia.    Labs and Fingersticks  CAPILLARY BLOOD GLUCOSE      POCT Blood Glucose.: 152 mg/dL (06 Dec 2019 14:10)  POCT Blood Glucose.: 224 mg/dL (06 Dec 2019 08:53)  POCT Blood Glucose.: 389 mg/dL (05 Dec 2019 21:25)  POCT Blood Glucose.: 181 mg/dL (05 Dec 2019 17:44)                        Medications  MEDICATIONS  (STANDING):  albuterol/ipratropium for Nebulization 3 milliLiter(s) Nebulizer every 6 hours  aspirin enteric coated 81 milliGRAM(s) Oral daily  atorvastatin 40 milliGRAM(s) Oral at bedtime  azithromycin   Tablet 250 milliGRAM(s) Oral daily  budesonide 160 MICROgram(s)/formoterol 4.5 MICROgram(s) Inhaler 2 Puff(s) Inhalation two times a day  chlorhexidine 4% Liquid 1 Application(s) Topical <User Schedule>  clopidogrel Tablet 75 milliGRAM(s) Oral daily  dextrose 5%. 1000 milliLiter(s) (50 mL/Hr) IV Continuous <Continuous>  dextrose 50% Injectable 12.5 Gram(s) IV Push once  dextrose 50% Injectable 25 Gram(s) IV Push once  dextrose 50% Injectable 25 Gram(s) IV Push once  enoxaparin Injectable 40 milliGRAM(s) SubCutaneous daily  famotidine    Tablet 20 milliGRAM(s) Oral daily  insulin glargine Injectable (LANTUS) 75 Unit(s) SubCutaneous at bedtime  insulin glargine Injectable (LANTUS) 80 Unit(s) SubCutaneous every morning  insulin lispro (HumaLOG) corrective regimen sliding scale   SubCutaneous three times a day before meals  insulin lispro Injectable (HumaLOG) 36 Unit(s) SubCutaneous three times a day before meals  melatonin 5 milliGRAM(s) Oral at bedtime  nicotine -  14 mG/24Hr(s) Patch 1 patch Transdermal daily  predniSONE   Tablet 20 milliGRAM(s) Oral every 12 hours  risperiDONE   Tablet 0.5 milliGRAM(s) Oral three times a day  sodium chloride 0.9%. 1000 milliLiter(s) (60 mL/Hr) IV Continuous <Continuous>      Physical Exam  General: Patient comfortable in bed  Vital Signs Last 12 Hrs  T(F): 97.2 (12-06-19 @ 14:14), Max: 97.5 (12-06-19 @ 05:00)  HR: 99 (12-06-19 @ 14:14) (73 - 99)  BP: 142/82 (12-06-19 @ 14:14) (129/85 - 142/82)  BP(mean): --  RR: 20 (12-06-19 @ 14:14) (20 - 20)  SpO2: 95% (12-06-19 @ 14:14) (95% - 97%)  Neck: No palpable thyroid nodules.  CVS: S1S2, No murmurs  Respiratory: No wheezing, no crepitations  GI: Abdomen soft, bowel sounds positive  Musculoskeletal:  edema lower extremities.   Skin: No skin rashes, no ecchymosis    Diagnostics

## 2019-12-06 NOTE — PROGRESS NOTE ADULT - SUBJECTIVE AND OBJECTIVE BOX
Patient is seen and examined at the bed side, is afebrile. He is doing  better, wheezing has  improved.        REVIEW OF SYSTEMS: All other review systems are negative        ALLERGIES: No Known Allergies        Vital Signs Last 24 Hrs  T(C): 36.2 (06 Dec 2019 14:14), Max: 36.4 (06 Dec 2019 05:00)  T(F): 97.2 (06 Dec 2019 14:14), Max: 97.5 (06 Dec 2019 05:00)  HR: 77 (06 Dec 2019 17:50) (73 - 99)  BP: 142/82 (06 Dec 2019 14:14) (121/73 - 142/82)  BP(mean): --  RR: 20 (06 Dec 2019 14:14) (20 - 20)  SpO2: 96% (06 Dec 2019 17:50) (95% - 98%)        PHYSICAL EXAM:  GENERAL: Not in distress, on oxygen  via NC  CHEST/LUNG:  Air entry bilaterally with No  wheezing today  HEART: s1 and s2 present  ABDOMEN:  Nontender and  Nondistended  EXTREMITIES: No pedal  edema  CNS: Awake and Alert        LABS: No new Labs                          11.9   6.73  )-----------( 221      ( 04 Dec 2019 10:58 )             37.9                           13.2   6.93  )-----------( 197      ( 03 Dec 2019 09:37 )             41.1         12-04    134<L>  |  91<L>  |  21  ----------------------------<  416<H>  5.3   |  30  |  0.62    Ca    9.1      04 Dec 2019 10:58  Mg     2.3     12-04      TPro  7.4  /  Alb  4.3  /  TBili  0.1<L>  /  DBili  x   /  AST  15  /  ALT  21  /  AlkPhos  80  12-02    12-03    129<L>  |  89<L>  |  22  ----------------------------<  397<H>  5.4<H>   |  27  |  0.65    Ca    9.2      03 Dec 2019 12:37    TPro  7.4  /  Alb  4.3  /  TBili  0.1<L>  /  DBili  x   /  AST  15  /  ALT  21  /  AlkPhos  80  12-02    PT/INR - ( 02 Dec 2019 23:50 )   PT: 11.5 sec;   INR: 1.00 ratio     PTT - ( 02 Dec 2019 23:50 )  PTT:30.5 sec        CAPILLARY BLOOD GLUCOSE  POCT Blood Glucose.: 342 mg/dL (03 Dec 2019 18:44)  POCT Blood Glucose.: 316 mg/dL (03 Dec 2019 13:12)  POCT Blood Glucose.: 455 mg/dL (03 Dec 2019 09:19)  POCT Blood Glucose.: 463 mg/dL (03 Dec 2019 09:18)        MEDICATIONS  (STANDING):  albuterol/ipratropium for Nebulization 3 milliLiter(s) Nebulizer every 6 hours  aspirin enteric coated 81 milliGRAM(s) Oral daily  atorvastatin 40 milliGRAM(s) Oral at bedtime  azithromycin   Tablet 250 milliGRAM(s) Oral daily  budesonide 160 MICROgram(s)/formoterol 4.5 MICROgram(s) Inhaler 2 Puff(s) Inhalation two times a day  chlorhexidine 4% Liquid 1 Application(s) Topical <User Schedule>  clopidogrel Tablet 75 milliGRAM(s) Oral daily  dextrose 5%. 1000 milliLiter(s) (50 mL/Hr) IV Continuous <Continuous>  dextrose 50% Injectable 12.5 Gram(s) IV Push once  dextrose 50% Injectable 25 Gram(s) IV Push once  dextrose 50% Injectable 25 Gram(s) IV Push once  enoxaparin Injectable 40 milliGRAM(s) SubCutaneous daily  famotidine    Tablet 20 milliGRAM(s) Oral daily  insulin glargine Injectable (LANTUS) 75 Unit(s) SubCutaneous at bedtime  insulin glargine Injectable (LANTUS) 80 Unit(s) SubCutaneous every morning  insulin lispro (HumaLOG) corrective regimen sliding scale   SubCutaneous three times a day before meals  insulin lispro Injectable (HumaLOG) 36 Unit(s) SubCutaneous three times a day before meals  melatonin 5 milliGRAM(s) Oral at bedtime  nicotine -  14 mG/24Hr(s) Patch 1 patch Transdermal daily  predniSONE   Tablet 20 milliGRAM(s) Oral every 12 hours  risperiDONE   Tablet 0.5 milliGRAM(s) Oral three times a day  sodium chloride 0.9%. 1000 milliLiter(s) (60 mL/Hr) IV Continuous <Continuous>    MEDICATIONS  (PRN):  acetaminophen   Tablet .. 1000 milliGRAM(s) Oral every 8 hours PRN Moderate Pain (4 - 6)  ALPRAZolam 0.5 milliGRAM(s) Oral two times a day PRN panic attack  dextrose 40% Gel 15 Gram(s) Oral once PRN Blood Glucose LESS THAN 70 milliGRAM(s)/deciliter  glucagon  Injectable 1 milliGRAM(s) IntraMuscular once PRN Glucose LESS THAN 70 milligrams/deciliter  ibuprofen  Tablet. 800 milliGRAM(s) Oral three times a day PRN Severe Pain (7 - 10)  lidocaine   Patch 1 Patch Transdermal daily PRN back pain  oxycodone    5 mG/acetaminophen 325 mG 2 Tablet(s) Oral every 6 hours PRN Severe Pain (7 - 10)        RADIOLOGY & ADDITIONAL TESTS:      12/3/19 : Xray Chest 1 View AP/PA (12.03.19 @ 00:41) Questionable small left pleural effusion..Prominent pulmonary artery,  which can be seen in pulmonary arterial hypertension.      12/3/19  CT Chest No Cont (12.03.19 @ 12:58) AIRWAYS, LUNGS and PLEURA: Patent central airways. Mild bronchial wall   thickening. No bronchiectasis. Moderate upper lung predominant emphysema.  Mild bandlike atelectasis within the right middle lobe and lingula.  Otherwise, the lungs are clear without consolidation.  No pleural effusion.      MICROBIOLOGY DATA:      Rapid Respiratory Viral Panel (12.02.19 @ 23:50)    Rapid RVP Result: Detected: This Respiratory Panel uses polymerase chain reaction (PCR) to detect for  adenovirus; coronavirus (HKU1, NL63, 229E, OC43); human metapneumovirus  (hMPV); human enterovirus/rhinovirus (Entero/RV); influenza A; influenza  A/H1; influenza A/H3; influenza A/H1-2009; influenza B; parainfluenza  viruses 1, 2, 3, 4; respiratory syncytial virus; Mycoplasma pneumoniae;  and Chlamydophila pneumoniae.

## 2019-12-06 NOTE — PROGRESS NOTE ADULT - ASSESSMENT
Patient is a 59y old  Male with COPD on 2L NC @ home, pulmonary HTN, CAD and stents, IDDM2, paranoid schizophrenia, anxiety,  now presents to the ER for evaluation of worsening SOB and cough for 1 week from long term NH.  He has been there for the past 8 months and states that he was not getting his nebs ATC and sometimes even PRN, thinks that exacerbated his SOB/COPD.  Active tobacco smoker, 2 cigs/day currently.  He has no  fever or chills,. ON arrival, he was tachycardic and tachypneic. The RVP detected RSV. The ID consult requested to assist with further evaluation and antibiotic management.     # Viral Pneumonia- RSV  # COPD Exacerbation    would recommend:    1. Isolation as per Infection Control Dept. policy X total of 5 days   2. Continue Supportive care for RSV, namely supplemental oxygenation and bronchodilator as needed  3.  Taper off steroid as tolerated    4.  OOB to chair     -stable from ID stand point

## 2019-12-06 NOTE — PROGRESS NOTE ADULT - SUBJECTIVE AND OBJECTIVE BOX
Patient seen and examined at bedside  c/o panic attack, hx of such with improvement w/ xanax  otherwise feeling a little better  Case discussed with medical team    HPI:  59 year-old male with history of COPD on 2L NC @ home, pulmonary HTN, CAD and stents, IDDM2, paranoid schizophrenia, anxietyp/w worsening SOB and cough for 1 week from long term NH.  He has been there for the past 8 months and states that he was not getting his nebs ATC and sometimes even PRN, thinks that exacerbated his SOB/COPD.  Active tobacco smoker, 2 cigs/day currently.  Denies fevers, chills, nausea, vomiting, abdominal pain, LE edema or sick contact. Wants Benzo to calm him down and to sleep. (03 Dec 2019 04:54)      PAST MEDICAL & SURGICAL HISTORY:  Oxygen dependent  Gastroesophageal reflux disease, esophagitis presence not specified  Smoker  Bipolar 1 disorder  Coronary artery disease involving native coronary artery of native heart without angina pectoris  Type 2 diabetes mellitus without complication, with long-term current use of insulin  Pulmonary HTN  HLD (hyperlipidemia)  Stented coronary artery  COPD (chronic obstructive pulmonary disease)  No significant past surgical history      No Known Allergies       MEDICATIONS  (STANDING):  albuterol/ipratropium for Nebulization 3 milliLiter(s) Nebulizer every 6 hours  aspirin enteric coated 81 milliGRAM(s) Oral daily  atorvastatin 40 milliGRAM(s) Oral at bedtime  azithromycin   Tablet 250 milliGRAM(s) Oral daily  budesonide 160 MICROgram(s)/formoterol 4.5 MICROgram(s) Inhaler 2 Puff(s) Inhalation two times a day  chlorhexidine 4% Liquid 1 Application(s) Topical <User Schedule>  clopidogrel Tablet 75 milliGRAM(s) Oral daily  dextrose 5%. 1000 milliLiter(s) (50 mL/Hr) IV Continuous <Continuous>  dextrose 50% Injectable 12.5 Gram(s) IV Push once  dextrose 50% Injectable 25 Gram(s) IV Push once  dextrose 50% Injectable 25 Gram(s) IV Push once  enoxaparin Injectable 40 milliGRAM(s) SubCutaneous daily  famotidine    Tablet 20 milliGRAM(s) Oral daily  insulin glargine Injectable (LANTUS) 75 Unit(s) SubCutaneous at bedtime  insulin glargine Injectable (LANTUS) 80 Unit(s) SubCutaneous every morning  insulin lispro (HumaLOG) corrective regimen sliding scale   SubCutaneous three times a day before meals  insulin lispro Injectable (HumaLOG) 36 Unit(s) SubCutaneous three times a day before meals  melatonin 5 milliGRAM(s) Oral at bedtime  nicotine -  14 mG/24Hr(s) Patch 1 patch Transdermal daily  predniSONE   Tablet 20 milliGRAM(s) Oral every 12 hours  risperiDONE   Tablet 0.5 milliGRAM(s) Oral three times a day  sodium chloride 0.9%. 1000 milliLiter(s) (60 mL/Hr) IV Continuous <Continuous>    MEDICATIONS  (PRN):  acetaminophen   Tablet .. 1000 milliGRAM(s) Oral every 8 hours PRN Moderate Pain (4 - 6)  ALPRAZolam 0.5 milliGRAM(s) Oral two times a day PRN panic attack  dextrose 40% Gel 15 Gram(s) Oral once PRN Blood Glucose LESS THAN 70 milliGRAM(s)/deciliter  glucagon  Injectable 1 milliGRAM(s) IntraMuscular once PRN Glucose LESS THAN 70 milligrams/deciliter  ibuprofen  Tablet. 800 milliGRAM(s) Oral three times a day PRN Severe Pain (7 - 10)  lidocaine   Patch 1 Patch Transdermal daily PRN back pain  oxycodone    5 mG/acetaminophen 325 mG 2 Tablet(s) Oral every 6 hours PRN Severe Pain (7 - 10)      REVIEW OF SYSTEMS:  CONSTITUTIONAL: (+) malaise. panic attack  EYES: No acute change in vision   ENT:  No tinnitus  NECK: No stiffness  RESPIRATORY: improving sob/erickson. No hemoptysis  CARDIOVASCULAR: No chest pain, palpitations, syncope  GASTROINTESTINAL: No hematemesis, diarrhea, melena, or hematochezia.  GENITOURINARY: No hematuria  NEUROLOGICAL: No headaches  LYMPH Nodes: No enlarged glands  ENDOCRINE: No heat or cold intolerance	    T(C): 36.4 (12-06-19 @ 05:00), Max: 36.6 (12-05-19 @ 13:55)  HR: 73 (12-06-19 @ 05:36) (73 - 106)  BP: 129/85 (12-06-19 @ 05:00) (118/81 - 129/85)  RR: 20 (12-06-19 @ 05:00) (20 - 20)  SpO2: 97% (12-06-19 @ 05:36) (93% - 98%)    PHYSICAL EXAMINATION:   Constitutional: WD, NAD  HEENT: NC, AT  Neck:  Supple  Respiratory:  Adequate airflow b/l. Not using accessory muscles of respiration.  Cardiovascular:  S1 & S2 intact, no R/G, 2+ radial pulses b/l  Gastrointestinal: Soft, NT, ND, normoactive b.s., no organomegaly/RT/rigidity  Extremities: WWP  Neurological:  Alert and awake.  No acute focal motor deficits. Crude sensation intact.     Labs and imaging reviewed    LABS:                        11.9   6.73  )-----------( 221      ( 04 Dec 2019 10:58 )             37.9     12-04    134<L>  |  91<L>  |  21  ----------------------------<  416<H>  5.3   |  30  |  0.62    Ca    9.1      04 Dec 2019 10:58  Mg     2.3     12-04              CAPILLARY BLOOD GLUCOSE      POCT Blood Glucose.: 224 mg/dL (06 Dec 2019 08:53)  POCT Blood Glucose.: 389 mg/dL (05 Dec 2019 21:25)  POCT Blood Glucose.: 181 mg/dL (05 Dec 2019 17:44)  POCT Blood Glucose.: 293 mg/dL (05 Dec 2019 13:08)  POCT Blood Glucose.: 383 mg/dL (05 Dec 2019 11:59)          ABG - ( 06 Dec 2019 07:26 )  pH, Arterial: 7.34  pH, Blood: x     /  pCO2: 56    /  pO2: 168   / HCO3: 30    / Base Excess: 3.3   /  SaO2: 99                  RADIOLOGY & ADDITIONAL STUDIES:

## 2019-12-07 ENCOUNTER — TRANSCRIPTION ENCOUNTER (OUTPATIENT)
Age: 59
End: 2019-12-07

## 2019-12-07 LAB
GLUCOSE BLDC GLUCOMTR-MCNC: 183 MG/DL — HIGH (ref 70–99)
GLUCOSE BLDC GLUCOMTR-MCNC: 191 MG/DL — HIGH (ref 70–99)
GLUCOSE BLDC GLUCOMTR-MCNC: 218 MG/DL — HIGH (ref 70–99)
GLUCOSE BLDC GLUCOMTR-MCNC: 87 MG/DL — SIGNIFICANT CHANGE UP (ref 70–99)

## 2019-12-07 RX ORDER — INSULIN GLARGINE 100 [IU]/ML
70 INJECTION, SOLUTION SUBCUTANEOUS EVERY MORNING
Refills: 0 | Status: DISCONTINUED | OUTPATIENT
Start: 2019-12-07 | End: 2019-12-08

## 2019-12-07 RX ORDER — INSULIN LISPRO 100/ML
10 VIAL (ML) SUBCUTANEOUS
Refills: 0 | Status: DISCONTINUED | OUTPATIENT
Start: 2019-12-07 | End: 2019-12-08

## 2019-12-07 RX ORDER — INSULIN LISPRO 100/ML
15 VIAL (ML) SUBCUTANEOUS
Refills: 0 | Status: DISCONTINUED | OUTPATIENT
Start: 2019-12-07 | End: 2019-12-07

## 2019-12-07 RX ADMIN — Medication 20 MILLIGRAM(S): at 18:21

## 2019-12-07 RX ADMIN — Medication 1 PATCH: at 07:33

## 2019-12-07 RX ADMIN — BUDESONIDE AND FORMOTEROL FUMARATE DIHYDRATE 2 PUFF(S): 160; 4.5 AEROSOL RESPIRATORY (INHALATION) at 17:43

## 2019-12-07 RX ADMIN — Medication 0.5 MILLIGRAM(S): at 22:44

## 2019-12-07 RX ADMIN — Medication 3 MILLILITER(S): at 11:19

## 2019-12-07 RX ADMIN — OXYCODONE AND ACETAMINOPHEN 2 TABLET(S): 5; 325 TABLET ORAL at 23:30

## 2019-12-07 RX ADMIN — Medication 3 MILLILITER(S): at 17:43

## 2019-12-07 RX ADMIN — Medication 5 MILLIGRAM(S): at 21:36

## 2019-12-07 RX ADMIN — OXYCODONE AND ACETAMINOPHEN 2 TABLET(S): 5; 325 TABLET ORAL at 10:33

## 2019-12-07 RX ADMIN — Medication 0.5 MILLIGRAM(S): at 13:06

## 2019-12-07 RX ADMIN — Medication 1 PATCH: at 13:06

## 2019-12-07 RX ADMIN — Medication 81 MILLIGRAM(S): at 13:08

## 2019-12-07 RX ADMIN — Medication 3 MILLILITER(S): at 05:32

## 2019-12-07 RX ADMIN — Medication 1000 MILLIGRAM(S): at 10:25

## 2019-12-07 RX ADMIN — ATORVASTATIN CALCIUM 40 MILLIGRAM(S): 80 TABLET, FILM COATED ORAL at 21:36

## 2019-12-07 RX ADMIN — Medication 10 UNIT(S): at 18:20

## 2019-12-07 RX ADMIN — Medication 800 MILLIGRAM(S): at 21:42

## 2019-12-07 RX ADMIN — RISPERIDONE 0.5 MILLIGRAM(S): 4 TABLET ORAL at 13:08

## 2019-12-07 RX ADMIN — CHLORHEXIDINE GLUCONATE 1 APPLICATION(S): 213 SOLUTION TOPICAL at 06:06

## 2019-12-07 RX ADMIN — Medication 15 UNIT(S): at 10:11

## 2019-12-07 RX ADMIN — Medication 1 PATCH: at 18:27

## 2019-12-07 RX ADMIN — RISPERIDONE 0.5 MILLIGRAM(S): 4 TABLET ORAL at 09:32

## 2019-12-07 RX ADMIN — Medication 1: at 18:20

## 2019-12-07 RX ADMIN — OXYCODONE AND ACETAMINOPHEN 2 TABLET(S): 5; 325 TABLET ORAL at 11:27

## 2019-12-07 RX ADMIN — Medication 1 PATCH: at 13:08

## 2019-12-07 RX ADMIN — RISPERIDONE 0.5 MILLIGRAM(S): 4 TABLET ORAL at 22:45

## 2019-12-07 RX ADMIN — ENOXAPARIN SODIUM 40 MILLIGRAM(S): 100 INJECTION SUBCUTANEOUS at 13:07

## 2019-12-07 RX ADMIN — FAMOTIDINE 20 MILLIGRAM(S): 10 INJECTION INTRAVENOUS at 13:08

## 2019-12-07 RX ADMIN — Medication 1000 MILLIGRAM(S): at 09:32

## 2019-12-07 RX ADMIN — Medication 20 MILLIGRAM(S): at 09:32

## 2019-12-07 RX ADMIN — Medication 3 MILLILITER(S): at 23:29

## 2019-12-07 RX ADMIN — Medication 15 UNIT(S): at 14:00

## 2019-12-07 RX ADMIN — Medication 800 MILLIGRAM(S): at 21:45

## 2019-12-07 RX ADMIN — OXYCODONE AND ACETAMINOPHEN 2 TABLET(S): 5; 325 TABLET ORAL at 18:21

## 2019-12-07 RX ADMIN — AZITHROMYCIN 250 MILLIGRAM(S): 500 TABLET, FILM COATED ORAL at 13:06

## 2019-12-07 RX ADMIN — BUDESONIDE AND FORMOTEROL FUMARATE DIHYDRATE 2 PUFF(S): 160; 4.5 AEROSOL RESPIRATORY (INHALATION) at 05:32

## 2019-12-07 RX ADMIN — Medication 1: at 14:00

## 2019-12-07 RX ADMIN — INSULIN GLARGINE 80 UNIT(S): 100 INJECTION, SOLUTION SUBCUTANEOUS at 10:10

## 2019-12-07 RX ADMIN — CLOPIDOGREL BISULFATE 75 MILLIGRAM(S): 75 TABLET, FILM COATED ORAL at 13:07

## 2019-12-07 RX ADMIN — INSULIN GLARGINE 75 UNIT(S): 100 INJECTION, SOLUTION SUBCUTANEOUS at 21:40

## 2019-12-07 NOTE — PROGRESS NOTE ADULT - SUBJECTIVE AND OBJECTIVE BOX
Patient is a 59y old  Male who presents with a chief complaint of SOB (06 Dec 2019 20:25)    Any change in ROS: no event overnight. denies sob. wet cough ++, sputum ++    MEDICATIONS  (STANDING):  albuterol/ipratropium for Nebulization 3 milliLiter(s) Nebulizer every 6 hours  aspirin enteric coated 81 milliGRAM(s) Oral daily  atorvastatin 40 milliGRAM(s) Oral at bedtime  azithromycin   Tablet 250 milliGRAM(s) Oral daily  budesonide 160 MICROgram(s)/formoterol 4.5 MICROgram(s) Inhaler 2 Puff(s) Inhalation two times a day  chlorhexidine 4% Liquid 1 Application(s) Topical <User Schedule>  clopidogrel Tablet 75 milliGRAM(s) Oral daily  dextrose 5%. 1000 milliLiter(s) (50 mL/Hr) IV Continuous <Continuous>  dextrose 50% Injectable 12.5 Gram(s) IV Push once  dextrose 50% Injectable 25 Gram(s) IV Push once  dextrose 50% Injectable 25 Gram(s) IV Push once  enoxaparin Injectable 40 milliGRAM(s) SubCutaneous daily  famotidine    Tablet 20 milliGRAM(s) Oral daily  insulin glargine Injectable (LANTUS) 75 Unit(s) SubCutaneous at bedtime  insulin glargine Injectable (LANTUS) 80 Unit(s) SubCutaneous every morning  insulin lispro (HumaLOG) corrective regimen sliding scale   SubCutaneous three times a day before meals  insulin lispro Injectable (HumaLOG) 36 Unit(s) SubCutaneous three times a day before meals  melatonin 5 milliGRAM(s) Oral at bedtime  nicotine -  14 mG/24Hr(s) Patch 1 patch Transdermal daily  predniSONE   Tablet 20 milliGRAM(s) Oral every 12 hours  risperiDONE   Tablet 0.5 milliGRAM(s) Oral three times a day  sodium chloride 0.9%. 1000 milliLiter(s) (60 mL/Hr) IV Continuous <Continuous>    MEDICATIONS  (PRN):  acetaminophen   Tablet .. 1000 milliGRAM(s) Oral every 8 hours PRN Moderate Pain (4 - 6)  ALPRAZolam 0.5 milliGRAM(s) Oral two times a day PRN panic attack  dextrose 40% Gel 15 Gram(s) Oral once PRN Blood Glucose LESS THAN 70 milliGRAM(s)/deciliter  glucagon  Injectable 1 milliGRAM(s) IntraMuscular once PRN Glucose LESS THAN 70 milligrams/deciliter  ibuprofen  Tablet. 800 milliGRAM(s) Oral three times a day PRN Severe Pain (7 - 10)  lidocaine   Patch 1 Patch Transdermal daily PRN back pain  oxycodone    5 mG/acetaminophen 325 mG 2 Tablet(s) Oral every 6 hours PRN Severe Pain (7 - 10)    Vital Signs Last 24 Hrs  T(C): 36.3 (07 Dec 2019 06:00), Max: 36.4 (06 Dec 2019 22:54)  T(F): 97.3 (07 Dec 2019 06:00), Max: 97.5 (06 Dec 2019 22:54)  HR: 87 (07 Dec 2019 06:00) (73 - 99)  BP: 147/85 (07 Dec 2019 06:00) (125/84 - 147/85)  BP(mean): --  RR: 20 (07 Dec 2019 06:00) (20 - 20)  SpO2: 97% (07 Dec 2019 06:00) (95% - 98%)    I&O's Summary        Physical Exam:   GENERAL: The patient comfortable with no apparent distress.   HEENT: Head is normocephalic and atraumatic.    NECK: Supple with no elevated JVP. short neck  LUNGS: diminished. coarse   HEART: S1 and S2 present without murmur.  ABDOMEN: Soft, nontender, and nondistended.   EXTREMITIES: No edema or calf tenderness.  NEUROLOGIC: Grossly intact.    Labs:  ABG - ( 06 Dec 2019 07:26 )  pH, Arterial: 7.34  pH, Blood: x     /  pCO2: 56    /  pO2: 168   / HCO3: 30    / Base Excess: 3.3   /  SaO2: 99              33                            11.9   6.73  )-----------( 221      ( 04 Dec 2019 10:58 )             37.9                         13.2   6.93  )-----------( 197      ( 03 Dec 2019 09:37 )             41.1     12-04    134<L>  |  91<L>  |  21  ----------------------------<  416<H>  5.3   |  30  |  0.62  12-03    129<L>  |  89<L>  |  22  ----------------------------<  397<H>  5.4<H>   |  27  |  0.65  12-03    127<L>  |  89<L>  |  20  ----------------------------<  502<HH>  6.2<HH>   |  24  |  0.63        CAPILLARY BLOOD GLUCOSE      POCT Blood Glucose.: 169 mg/dL (06 Dec 2019 22:17)  POCT Blood Glucose.: 117 mg/dL (06 Dec 2019 17:09)  POCT Blood Glucose.: 152 mg/dL (06 Dec 2019 14:10)            Procalcitonin, Serum: 0.08 ng/mL (12-04 @ 07:18)      Studies  Chest X-RAY  < from: Xray Chest 1 View AP/PA (12.03.19 @ 00:41) >    EXAM:  XR CHEST AP OR PA 1V                            PROCEDURE DATE:  12/03/2019            INTERPRETATION:  CLINICAL INDICATION: Shortness of breath.    EXAM: Frontal view of the chest with comparison made to chest radiograph   on 5/7/2019    FINDINGS:   The heart size is normal. Prominent pulmonary artery, which can be seen   in pulmonary arterial hypertension.  The left hemidiaphragm is obscured which may be secondary to a small left   pleural effusion. No focal consolidation is seen.  Thebones are unremarkable.    IMPRESSION:   Questionable small left pleural effusion..Prominent pulmonary artery,   which can be seen in pulmonary arterial hypertension.                GARO CALVIN M.D., RADIOLOGY RESIDENT  This document has been electronically signed.  YASMINE WOO M.D., ATTENDING RADIOLOGIST  This document has been electronically signed. Dec  3 2019 10:26AM              < end of copied text >    CT SCAN Chest   < from: CT Chest No Cont (12.03.19 @ 12:58) >    EXAM:  CT CHEST                            PROCEDURE DATE:  12/03/2019            INTERPRETATION:  CLINICAL INFORMATION: Shortness of breath    COMPARISON: CT chest 10/5/2018    PROCEDURE:   CT of the Chest was performed without intravenous contrast.  Sagittal and coronal reformats were performed.      FINDINGS:    AIRWAYS, LUNGS and PLEURA: Patent central airways. Mild bronchial wall   thickening. No bronchiectasis. Moderate upper lung predominant emphysema.   Mild bandlike atelectasis within the right middle lobe and lingula.   Otherwise, the lungs are clear without consolidation.    No pleural effusion.    MEDIASTINUM AND [place]: No lymphadenopathy.    HEART AND VESSELS: Heart size is normal. No pericardial effusion.   Thoracic aorta and pulmonary artery are normal in diameter. Moderate   calcific coronary atherosclerosis.    VISUALIZED UPPER ABDOMEN: Too small to characterize hypodensity within   the right lobe of the liver (series 2 image 109), unchanged.    CHEST WALL AND BONES: No aggressive osseous lesion. Bilateral   gynecomastia. Old fracture deformity of posterior lower ribs.    LOWER NECK: Within normal limits.    IMPRESSION:     No pneumonia.    Emphysema.          < end of copied text >    Venous Dopplers: LE: n/a   CT Abdomen n/a   Others  < from: TTE Echo Complete w/Doppler (10.04.18 @ 11:54) >    EXAM:  ECHO TRANSTHORACIC COMP W DOPP      PROCEDURE DATE:  Oct  4 2018   .      INTERPRETATION:  REPORT:    TRANSTHORACIC ECHOCARDIOGRAM REPORT         Patient Name:   DEVIKA CARBALLO Patient Location: [facility] Rec #:  [id number]      Accession #:      [id number]  Account #:                    Height:           68.0 in 172.7 cm  YOB: 1960     Weight:           200.0 lb 90.72 kg  Patient Age:    58 years      BSA:              2.04 m²  Patient Gender: M             BP:        102/61 mmHg       Date of Exam:        10/4/2018 11:54:09 AM  Sonographer:         Michelle Tolentino  Referring Physician: Ana Laura Dahl MD    Procedure:     2D Echo/Doppler/Color Doppler Complete.  Indications:   Unspecified combined systolic (congestive) and diastolic                 (congestive) heart failure - I50.40  Diagnosis:     Unspecified combined systolic (congestive) and diastolic                 (congestive) heart failure - I50.40  Study Details: Technically adequate study. Study quality was adversely   affected                 due to patient obesity, body habitus, COPD and lung   interference.         2D AND M-MODE MEASUREMENTS (normal ranges within parentheses):  Left                Normal    Aorta/Left          Normal  Ventricle:                    Atrium:  IVSd (2D):    1.08  (0.7-1.1) Aortic Root  3.20   (2.4-3.7)                cm              (Mmode):     cm  LVPWd (2D):   1.19  (0.7-1.1) Left Atrium  3.70   (1.9-4.0)                cm              (Mmode):cm  LVIDd (2D):   4.52  (3.4-5.7) LA Volume    12.5                cm              Index        ml/m²  LVIDs (2D):   3.10                cm  LV FS (2D):   31.4  (>25%)                %  Relative Wall 0.53  (<0.42)  Thickness    LV SYSTOLIC FUNCTION BY 2D PLANIMETRY (MOD):  EF-A4C View: 78.5 % EF-A2C View: 78.0 % EF-Biplane: 79 %    LV DIASTOLIC FUNCTION:  MV Peak E: 0.46 m/s E/e' Ratio: 8.40  MV Peak A: 0.70 m/s  E/A Ratio: 0.67    SPECTRAL DOPPLER ANALYSIS (where applicable):  LVOT Vmax:  LVOT VTI:  LVOT Diameter: 1.97 cm    Tricuspid Valve and PA/RV Systolic Pressure: TR Max Velocity: 3.65 m/s RA   Pressure: 3 mmHg RVSP/PASP: 56.3 mmHg       PHYSICIAN INTERPRETATION:  Left Ventricle: The left ventricular internal cavity size is normal.  Global LV systolic function was hyperdynamic. Left ventricular ejection   fraction, by visual estimation, is >75%. The interventricular septum is   flattened in systole and diastole, consistent with right ventricular   pressure and volume overload. Spectral Doppler shows impaired relaxation   pattern of left ventricular myocardial filling (Grade I diastolic   dysfunction).  Right Ventricle: The right ventricular size is severely enlarged. RV   systolic function is severely reduced.  Left Atrium: Normal left atrial size.  Right Atrium: Severely enlarged right atrium.  Pericardium: There is no evidence of pericardial effusion.  Mitral Valve: Structurally normal mitral valve, with normal leaflet   excursion. Trace mitral valve regurgitation is seen.  Tricuspid Valve: The tricuspid valve is normal. Moderate tricuspid   regurgitation is visualized. Estimated pulmonary artery systolic pressure   is 56.3 mmHg assuming a right atrial pressure of 3 mmHg, which is   consistent with moderate pulmonary hypertension.  Aortic Valve: The aortic valve is trileaflet. Sclerotic aortic valve with   normal opening. No evidence of aortic valve regurgitation is seen.  Pulmonic Valve: Structurally normal pulmonic valve, with normal leaflet   excursion. Trace pulmonic valve regurgitation.  Aorta: The aortic root and ascending aorta are structurally normal, with   no evidence of dilitation.  Pulmonary Artery: The pulmonary artery is mildly dilated.  Venous: The inferior vena cava was normal sized, with respiratory size   variation greater than 50%.  In comparison to the previous echocardiogram(s): There are no prior   studies on this patient for comparison purposes. Findings are suggestive   of acute pulmonary embolism.       Summary:   1. Normal left atrial size.   2. Hyperdynamic wall motion.   3. Left ventricular ejection fraction, by visual estimation, is >75%.   Grade I diastolic dysfunction.   4. Severely enlarged right atrium.   5. Severely enlarged right ventricle. Severely reduced RV systolic  function.   6. Moderate tricuspid regurgitation.   7. Estimated pulmonary artery systolic pressure is 56.3 mmHg -moderate   pulmonary hypertension.   8. There is no evidence of pericardial effusion.   9. Findings are suggestive of acute pulmonary embolism.    [id number] Moshe Martínez MD, RPVI, Electronically signed on 12/7/2018 at   4:03:19 PM              *** Final ***                < end of copied text >

## 2019-12-07 NOTE — PROGRESS NOTE ADULT - PROBLEM SELECTOR PLAN 1
- improving accordingly. C/w PO prednisone with slow taper.  Pt is medically cleared and optimized for safe discharge. Case management for arranging safe discharge   All team members management and pt care appreciated

## 2019-12-07 NOTE — PROGRESS NOTE ADULT - SUBJECTIVE AND OBJECTIVE BOX
Patient seen and examined at bedside  No acute events noted overnight  Case discussed with medical team    HPI:  59 year-old male with history of COPD on 2L NC @ home, pulmonary HTN, CAD and stents, IDDM2, paranoid schizophrenia, anxietyp/w worsening SOB and cough for 1 week from long term NH.  He has been there for the past 8 months and states that he was not getting his nebs ATC and sometimes even PRN, thinks that exacerbated his SOB/COPD.  Active tobacco smoker, 2 cigs/day currently.  Denies fevers, chills, nausea, vomiting, abdominal pain, LE edema or sick contact. Wants Benzo to calm him down and to sleep. (03 Dec 2019 04:54)      PAST MEDICAL & SURGICAL HISTORY:  Oxygen dependent  Gastroesophageal reflux disease, esophagitis presence not specified  Smoker  Bipolar 1 disorder  Coronary artery disease involving native coronary artery of native heart without angina pectoris  Type 2 diabetes mellitus without complication, with long-term current use of insulin  Pulmonary HTN  HLD (hyperlipidemia)  Stented coronary artery  COPD (chronic obstructive pulmonary disease)  No significant past surgical history      No Known Allergies       MEDICATIONS  (STANDING):  albuterol/ipratropium for Nebulization 3 milliLiter(s) Nebulizer every 6 hours  aspirin enteric coated 81 milliGRAM(s) Oral daily  atorvastatin 40 milliGRAM(s) Oral at bedtime  azithromycin   Tablet 250 milliGRAM(s) Oral daily  budesonide 160 MICROgram(s)/formoterol 4.5 MICROgram(s) Inhaler 2 Puff(s) Inhalation two times a day  chlorhexidine 4% Liquid 1 Application(s) Topical <User Schedule>  clopidogrel Tablet 75 milliGRAM(s) Oral daily  dextrose 5%. 1000 milliLiter(s) (50 mL/Hr) IV Continuous <Continuous>  dextrose 50% Injectable 12.5 Gram(s) IV Push once  dextrose 50% Injectable 25 Gram(s) IV Push once  dextrose 50% Injectable 25 Gram(s) IV Push once  enoxaparin Injectable 40 milliGRAM(s) SubCutaneous daily  famotidine    Tablet 20 milliGRAM(s) Oral daily  insulin glargine Injectable (LANTUS) 75 Unit(s) SubCutaneous at bedtime  insulin glargine Injectable (LANTUS) 80 Unit(s) SubCutaneous every morning  insulin lispro (HumaLOG) corrective regimen sliding scale   SubCutaneous three times a day before meals  insulin lispro Injectable (HumaLOG) 15 Unit(s) SubCutaneous three times a day before meals  melatonin 5 milliGRAM(s) Oral at bedtime  nicotine -  14 mG/24Hr(s) Patch 1 patch Transdermal daily  predniSONE   Tablet 20 milliGRAM(s) Oral every 12 hours  risperiDONE   Tablet 0.5 milliGRAM(s) Oral three times a day  sodium chloride 0.9%. 1000 milliLiter(s) (60 mL/Hr) IV Continuous <Continuous>    MEDICATIONS  (PRN):  acetaminophen   Tablet .. 1000 milliGRAM(s) Oral every 8 hours PRN Moderate Pain (4 - 6)  ALPRAZolam 0.5 milliGRAM(s) Oral two times a day PRN panic attack  dextrose 40% Gel 15 Gram(s) Oral once PRN Blood Glucose LESS THAN 70 milliGRAM(s)/deciliter  glucagon  Injectable 1 milliGRAM(s) IntraMuscular once PRN Glucose LESS THAN 70 milligrams/deciliter  ibuprofen  Tablet. 800 milliGRAM(s) Oral three times a day PRN Severe Pain (7 - 10)  lidocaine   Patch 1 Patch Transdermal daily PRN back pain  oxycodone    5 mG/acetaminophen 325 mG 2 Tablet(s) Oral every 6 hours PRN Severe Pain (7 - 10)      REVIEW OF SYSTEMS:  CONSTITUTIONAL: (+) malaise.   EYES: No acute change in vision   ENT:  No tinnitus  NECK: No stiffness  RESPIRATORY: No hemoptysis  CARDIOVASCULAR: No chest pain, palpitations, syncope  GASTROINTESTINAL: No hematemesis, diarrhea, melena, or hematochezia.  GENITOURINARY: No hematuria  NEUROLOGICAL: No headaches  LYMPH Nodes: No enlarged glands  ENDOCRINE: No heat or cold intolerance	    T(C): 36.3 (12-07-19 @ 06:00), Max: 36.4 (12-06-19 @ 22:54)  HR: 87 (12-07-19 @ 06:00) (73 - 99)  BP: 147/85 (12-07-19 @ 06:00) (125/84 - 147/85)  RR: 20 (12-07-19 @ 06:00) (20 - 20)  SpO2: 97% (12-07-19 @ 06:00) (95% - 98%)    PHYSICAL EXAMINATION:   Constitutional: WD, NAD  HEENT: NC, AT  Neck:  Supple  Respiratory:  Adequate airflow b/l. Not using accessory muscles of respiration.  Cardiovascular:  S1 & S2 intact, no R/G, 2+ radial pulses b/l  Gastrointestinal: Soft, NT, ND, normoactive b.s., no organomegaly/RT/rigidity  Extremities: WWP  Neurological:  Alert and awake.  No acute focal motor deficits. Crude sensation intact.     Labs and imaging reviewed    LABS:                  CAPILLARY BLOOD GLUCOSE      POCT Blood Glucose.: 87 mg/dL (07 Dec 2019 09:36)  POCT Blood Glucose.: 169 mg/dL (06 Dec 2019 22:17)  POCT Blood Glucose.: 117 mg/dL (06 Dec 2019 17:09)  POCT Blood Glucose.: 152 mg/dL (06 Dec 2019 14:10)          ABG - ( 06 Dec 2019 07:26 )  pH, Arterial: 7.34  pH, Blood: x     /  pCO2: 56    /  pO2: 168   / HCO3: 30    / Base Excess: 3.3   /  SaO2: 99                  RADIOLOGY & ADDITIONAL STUDIES:

## 2019-12-07 NOTE — DISCHARGE NOTE PROVIDER - NSDCFUADDAPPT_GEN_ALL_CORE_FT
Call to schedule follow up appointments with primary care provider Dr. Blele and endocrinologist Dr. Romero,

## 2019-12-07 NOTE — PROGRESS NOTE ADULT - PROBLEM SELECTOR PLAN 1
Still feels sob: BUT NOT MUCH OF WHEEZING: He is an active smoker: Cont steroids as wellas BD and oxygen: HIS VENOUS ABG IS REVIEWED: HE LIKELY HAS CHR RETENTION AND MILD AC ON CHR HYPERCARBIC RESP FAILURE: WILL REPEAT HIS abg IN am: HE SEEMS COMFORTABLE: I DON'T THINK HE NEEDS BIPAP at this time  12/5: doing much better: change to po steroids and decrease the dose: no wheezing  12/6: Mild ac on chr hypercarbic resp failure: secondary to copd exacerbation: doing pretty well: alert and awake: no need for bipap  12/7 stable. continue current management

## 2019-12-07 NOTE — DISCHARGE NOTE PROVIDER - NSDCCPCAREPLAN_GEN_ALL_CORE_FT
PRINCIPAL DISCHARGE DIAGNOSIS  Diagnosis: COPD exacerbation  Assessment and Plan of Treatment: COPD exacerbation secondary to  RSV respiratory infection  Completed steroid taper and Azithromycin  Smoking cessation discussed  Continue home oxygen      SECONDARY DISCHARGE DIAGNOSES  Diagnosis: Type 2 diabetes mellitus with hyperglycemia, with long-term current use of insulin  Assessment and Plan of Treatment: Lantus and premeal insulin adjust  Follow up with endocrinologist  Make sure you get your HgA1c checked every three months.  If you take oral diabetes medications, check your blood glucose two times a day.  If you take insulin, check your blood glucose before meals and at bedtime.  It's important not to skip any meals.  Keep a log of your blood glucose results and always take it with you to your doctor appointments.  Keep a list of your current medications including injectables and over the counter medications and bring this medication list with you to all your doctor appointments.  If you have not seen your ophthalmologist this year call for appointment.  Check your feet daily for redness, sores, or openings. Do not self treat. If no improvement in two days call your primary care physician for an appointment.  Low blood sugar (hypoglycemia) is a blood sugar below 70mg/dl. Check your blood sugar if you feel signs/symptoms of hypoglycemia. If your blood sugar is below 70 take 15 grams of carbohydrates (ex 4 oz of apple juice, 3-4 glucose tablets, or 4-6 oz of regular soda) wait 15 minutes and repeat blood sugar to make sure it comes up above 70.  If your blood sugar is above 70 and you are due for a meal, have a meal.  If you are not due for a meal have a snack.  This snack helps keeps your blood sugar at a safe range.

## 2019-12-07 NOTE — PROGRESS NOTE ADULT - ASSESSMENT
Assessment  DMT2: 59y Male with DM T2 with hyperglycemia, A1C 10.4%, on insulin, blood sugars trending down no hypoglycemic episode, patient is eating meals,  comfortable, states he feels better with the nebulizer tx.  COPD Exacerbation: on medications and nebulizer treatment, stable, monitored.  HTN: Controlled,  on antihypertensive medications.  HLD: Controlled, on statin.  Schizophrenia: On meds.  Obesity: No strict exercise routines, not on any weight loss program, neither on low calorie diet.          Laury Romero MD  Cell: [phone]  Office: [phone]

## 2019-12-07 NOTE — DISCHARGE NOTE PROVIDER - NSDCMRMEDTOKEN_GEN_ALL_CORE_FT
albuterol 2.5 mg/3 mL (0.083%) inhalation solution: 2.5 milligram(s) inhaled every 6 hours, As Needed SOB  aspirin 81 mg oral tablet: 1 tab(s) orally once a day  atorvastatin 40 mg oral tablet: 1 tab(s) orally once a day  capsaicin topical: Apply topically to affected area once a day, remove at 9pm  HumaLOG 100 units/mL subcutaneous solution: 24 unit(s) subcutaneous 3 times a day (before meals)  ibuprofen 800 mg oral tablet: 1 tab(s) orally 4 times a day, As Needed  insulin glargine: 66 unit(s) subcutaneous 2 times a day  Melatonin 3 mg oral tablet, disintegratin tab(s) orally once a day (at bedtime)  Pepcid 20 mg oral tablet: orally once a day  Plavix 75 mg oral tablet: 1 tab(s) orally once a day  RisperDAL 0.5 mg oral tablet: orally 3 times a day  Tylenol 500 mg oral tablet: 2 tab(s) orally every 8 hours, As Needed albuterol 2.5 mg/3 mL (0.083%) inhalation solution: 2.5 milligram(s) inhaled every 6 hours, As Needed SOB  ALPRAZolam 1 mg oral tablet: 1 tab(s) orally every 8 hours, As needed, panic attack  aspirin 81 mg oral tablet: 1 tab(s) orally once a day  atorvastatin 40 mg oral tablet: 1 tab(s) orally once a day  budesonide-formoterol 160 mcg-4.5 mcg/inh inhalation aerosol: 2 puff(s) inhaled 2 times a day  capsaicin topical: Apply topically to affected area once a day, remove at 9pm  furosemide 20 mg oral tablet: 1 tab(s) orally once a day  HumaLOG 100 units/mL subcutaneous solution: 10 unit(s) subcutaneous 3 times a day (with meals)  ibuprofen 800 mg oral tablet: 1 tab(s) orally 3 times a day, As needed, Moderate Pain (4 - 6)  insulin glargine: 60 unit(s) subcutaneous 2 times a day  melatonin 5 mg oral tablet: 1 tab(s) orally once a day (at bedtime)  oxycodone-acetaminophen 5 mg-325 mg oral tablet: 2 tab(s) orally every 6 hours, As needed, Severe Pain (7 - 10)  Pepcid 20 mg oral tablet: orally once a day  Plavix 75 mg oral tablet: 1 tab(s) orally once a day  polyethylene glycol 3350 oral powder for reconstitution: 17 gram(s) orally once a day  predniSONE 10 mg oral tablet: 3 tab(s) orally once a day x 4 days  RisperDAL 0.5 mg oral tablet: orally 3 times a day  senna oral tablet: 2 tab(s) orally once a day (at bedtime)

## 2019-12-07 NOTE — PROGRESS NOTE ADULT - PROBLEM SELECTOR PLAN 2
secondary to steroids: cotn insulin  12/5: cont to control per endo  12/*6: monitor and control per endo  12/7 FS with sliding scale. Endo is following

## 2019-12-07 NOTE — DISCHARGE NOTE PROVIDER - CARE PROVIDER_API CALL
Laury Romero)  EndocrinologyMetabDiabetes; Internal Medicine  [id number] [place] [address]  Phone: [phone]  Fax: [phone]  Follow Up Time: 2 weeks

## 2019-12-07 NOTE — DISCHARGE NOTE PROVIDER - HOSPITAL COURSE
Mr. Sumner is a 59 year-old male with history of COPD on 2L NC @ home, pulmonary HTN, CAD and stents, DDM2, paranoid schizophrenia, anxiety p/w worsening SOB and cough for 1 week from long term NH.  He has been there for the past 8 months and states that he was not getting his nebs ATC and sometimes even PRN, thinks that exacerbated his SOB/COPD.  Active tobacco smoker, 2 cigs/day currently.  Denies fevers, chills, nausea, vomiting, abdominal pain, LE edema or sick contact. Tested positive for RSV. Placed on contact isolation and also treated with steroids and five days of Azithromycin. Patient medically stable for discharge to skilled nursing facility. Mr. Sumner is a 59 year-old male with history of COPD on 2L NC @ home, pulmonary HTN, CAD and stents, DDM2, paranoid schizophrenia, anxiety p/w worsening SOB and cough for 1 week from long term NH.  He has been there for the past 8 months and states that he was not getting his nebs ATC and sometimes even PRN, thinks that exacerbated his SOB/COPD.  Active tobacco smoker, 2 cigs/day currently.  Denies fevers, chills, nausea, vomiting, abdominal pain, LE edema or sick contact. Tested positive for RSV. Placed on contact isolation and also treated with steroids and five days of Azithromycin. BG initially elevated while on steroids, now with 1 edpisode of hypoglycemia, endo following and insulins adjusted. Patient medically stable for discharge to skilled nursing facility.

## 2019-12-07 NOTE — PROGRESS NOTE ADULT - SUBJECTIVE AND OBJECTIVE BOX
Chief complaint  Patient is a 59y old  Male who presents with a chief complaint of SOB (07 Dec 2019 15:08)   Review of systems  Patient in bed, looks comfortable, no fever, no hypoglycemia.    Labs and Fingersticks  CAPILLARY BLOOD GLUCOSE      POCT Blood Glucose.: 218 mg/dL (07 Dec 2019 21:37)  POCT Blood Glucose.: 191 mg/dL (07 Dec 2019 18:03)  POCT Blood Glucose.: 183 mg/dL (07 Dec 2019 13:38)  POCT Blood Glucose.: 87 mg/dL (07 Dec 2019 09:36)                        Medications  MEDICATIONS  (STANDING):  albuterol/ipratropium for Nebulization 3 milliLiter(s) Nebulizer every 6 hours  aspirin enteric coated 81 milliGRAM(s) Oral daily  atorvastatin 40 milliGRAM(s) Oral at bedtime  budesonide 160 MICROgram(s)/formoterol 4.5 MICROgram(s) Inhaler 2 Puff(s) Inhalation two times a day  chlorhexidine 4% Liquid 1 Application(s) Topical <User Schedule>  clopidogrel Tablet 75 milliGRAM(s) Oral daily  dextrose 5%. 1000 milliLiter(s) (50 mL/Hr) IV Continuous <Continuous>  dextrose 50% Injectable 12.5 Gram(s) IV Push once  dextrose 50% Injectable 25 Gram(s) IV Push once  dextrose 50% Injectable 25 Gram(s) IV Push once  enoxaparin Injectable 40 milliGRAM(s) SubCutaneous daily  famotidine    Tablet 20 milliGRAM(s) Oral daily  insulin glargine Injectable (LANTUS) 75 Unit(s) SubCutaneous at bedtime  insulin glargine Injectable (LANTUS) 70 Unit(s) SubCutaneous every morning  insulin lispro (HumaLOG) corrective regimen sliding scale   SubCutaneous three times a day before meals  insulin lispro Injectable (HumaLOG) 10 Unit(s) SubCutaneous three times a day before meals  melatonin 5 milliGRAM(s) Oral at bedtime  nicotine -  14 mG/24Hr(s) Patch 1 patch Transdermal daily  predniSONE   Tablet 20 milliGRAM(s) Oral every 12 hours  risperiDONE   Tablet 0.5 milliGRAM(s) Oral three times a day  sodium chloride 0.9%. 1000 milliLiter(s) (60 mL/Hr) IV Continuous <Continuous>      Physical Exam  General: Patient comfortable in bed  Vital Signs Last 12 Hrs  T(F): 97.9 (12-07-19 @ 21:46), Max: 97.9 (12-07-19 @ 21:46)  HR: 98 (12-07-19 @ 21:46) (72 - 98)  BP: 124/82 (12-07-19 @ 21:46) (124/82 - 131/72)  BP(mean): --  RR: 19 (12-07-19 @ 21:46) (19 - 19)  SpO2: 95% (12-07-19 @ 21:46) (95% - 100%)  Neck: No palpable thyroid nodules.  CVS: S1S2, No murmurs  Respiratory: No wheezing, no crepitations  GI: Abdomen soft, bowel sounds positive  Musculoskeletal:  edema lower extremities.   Skin: No skin rashes, no ecchymosis    Diagnostics

## 2019-12-08 LAB
GLUCOSE BLDC GLUCOMTR-MCNC: 243 MG/DL — HIGH (ref 70–99)
GLUCOSE BLDC GLUCOMTR-MCNC: 253 MG/DL — HIGH (ref 70–99)
GLUCOSE BLDC GLUCOMTR-MCNC: 256 MG/DL — HIGH (ref 70–99)
GLUCOSE BLDC GLUCOMTR-MCNC: 309 MG/DL — HIGH (ref 70–99)

## 2019-12-08 RX ORDER — INSULIN GLARGINE 100 [IU]/ML
73 INJECTION, SOLUTION SUBCUTANEOUS EVERY MORNING
Refills: 0 | Status: DISCONTINUED | OUTPATIENT
Start: 2019-12-08 | End: 2019-12-11

## 2019-12-08 RX ORDER — INSULIN LISPRO 100/ML
13 VIAL (ML) SUBCUTANEOUS
Refills: 0 | Status: DISCONTINUED | OUTPATIENT
Start: 2019-12-08 | End: 2019-12-11

## 2019-12-08 RX ORDER — IBUPROFEN 200 MG
800 TABLET ORAL THREE TIMES A DAY
Refills: 0 | Status: DISCONTINUED | OUTPATIENT
Start: 2019-12-08 | End: 2019-12-12

## 2019-12-08 RX ORDER — OXYCODONE HYDROCHLORIDE 5 MG/1
10 TABLET ORAL ONCE
Refills: 0 | Status: DISCONTINUED | OUTPATIENT
Start: 2019-12-08 | End: 2019-12-08

## 2019-12-08 RX ORDER — ALPRAZOLAM 0.25 MG
1 TABLET ORAL
Refills: 0 | Status: DISCONTINUED | OUTPATIENT
Start: 2019-12-08 | End: 2019-12-09

## 2019-12-08 RX ORDER — INSULIN LISPRO 100/ML
VIAL (ML) SUBCUTANEOUS AT BEDTIME
Refills: 0 | Status: DISCONTINUED | OUTPATIENT
Start: 2019-12-08 | End: 2019-12-12

## 2019-12-08 RX ADMIN — ENOXAPARIN SODIUM 40 MILLIGRAM(S): 100 INJECTION SUBCUTANEOUS at 12:17

## 2019-12-08 RX ADMIN — OXYCODONE AND ACETAMINOPHEN 2 TABLET(S): 5; 325 TABLET ORAL at 07:25

## 2019-12-08 RX ADMIN — Medication 3: at 12:18

## 2019-12-08 RX ADMIN — FAMOTIDINE 20 MILLIGRAM(S): 10 INJECTION INTRAVENOUS at 12:16

## 2019-12-08 RX ADMIN — Medication 1 MILLIGRAM(S): at 12:17

## 2019-12-08 RX ADMIN — Medication 2: at 21:52

## 2019-12-08 RX ADMIN — Medication 10 UNIT(S): at 10:10

## 2019-12-08 RX ADMIN — OXYCODONE HYDROCHLORIDE 10 MILLIGRAM(S): 5 TABLET ORAL at 10:40

## 2019-12-08 RX ADMIN — BUDESONIDE AND FORMOTEROL FUMARATE DIHYDRATE 2 PUFF(S): 160; 4.5 AEROSOL RESPIRATORY (INHALATION) at 05:33

## 2019-12-08 RX ADMIN — Medication 1000 MILLIGRAM(S): at 06:14

## 2019-12-08 RX ADMIN — Medication 5 MILLIGRAM(S): at 21:51

## 2019-12-08 RX ADMIN — Medication 20 MILLIGRAM(S): at 06:18

## 2019-12-08 RX ADMIN — Medication 3 MILLILITER(S): at 23:12

## 2019-12-08 RX ADMIN — Medication 13 UNIT(S): at 17:50

## 2019-12-08 RX ADMIN — Medication 3 MILLILITER(S): at 11:24

## 2019-12-08 RX ADMIN — OXYCODONE HYDROCHLORIDE 10 MILLIGRAM(S): 5 TABLET ORAL at 09:49

## 2019-12-08 RX ADMIN — Medication 800 MILLIGRAM(S): at 20:45

## 2019-12-08 RX ADMIN — OXYCODONE AND ACETAMINOPHEN 2 TABLET(S): 5; 325 TABLET ORAL at 15:42

## 2019-12-08 RX ADMIN — RISPERIDONE 0.5 MILLIGRAM(S): 4 TABLET ORAL at 21:51

## 2019-12-08 RX ADMIN — Medication 81 MILLIGRAM(S): at 12:16

## 2019-12-08 RX ADMIN — Medication 3 MILLILITER(S): at 18:17

## 2019-12-08 RX ADMIN — Medication 2: at 10:10

## 2019-12-08 RX ADMIN — BUDESONIDE AND FORMOTEROL FUMARATE DIHYDRATE 2 PUFF(S): 160; 4.5 AEROSOL RESPIRATORY (INHALATION) at 18:16

## 2019-12-08 RX ADMIN — INSULIN GLARGINE 75 UNIT(S): 100 INJECTION, SOLUTION SUBCUTANEOUS at 21:51

## 2019-12-08 RX ADMIN — CLOPIDOGREL BISULFATE 75 MILLIGRAM(S): 75 TABLET, FILM COATED ORAL at 12:16

## 2019-12-08 RX ADMIN — RISPERIDONE 0.5 MILLIGRAM(S): 4 TABLET ORAL at 13:36

## 2019-12-08 RX ADMIN — Medication 800 MILLIGRAM(S): at 19:55

## 2019-12-08 RX ADMIN — CHLORHEXIDINE GLUCONATE 1 APPLICATION(S): 213 SOLUTION TOPICAL at 14:27

## 2019-12-08 RX ADMIN — OXYCODONE AND ACETAMINOPHEN 2 TABLET(S): 5; 325 TABLET ORAL at 16:29

## 2019-12-08 RX ADMIN — Medication 3 MILLILITER(S): at 05:34

## 2019-12-08 RX ADMIN — INSULIN GLARGINE 70 UNIT(S): 100 INJECTION, SOLUTION SUBCUTANEOUS at 10:11

## 2019-12-08 RX ADMIN — ATORVASTATIN CALCIUM 40 MILLIGRAM(S): 80 TABLET, FILM COATED ORAL at 21:51

## 2019-12-08 RX ADMIN — Medication 1000 MILLIGRAM(S): at 07:24

## 2019-12-08 RX ADMIN — Medication 10 UNIT(S): at 12:18

## 2019-12-08 RX ADMIN — Medication 20 MILLIGRAM(S): at 17:50

## 2019-12-08 RX ADMIN — OXYCODONE AND ACETAMINOPHEN 2 TABLET(S): 5; 325 TABLET ORAL at 21:56

## 2019-12-08 RX ADMIN — RISPERIDONE 0.5 MILLIGRAM(S): 4 TABLET ORAL at 06:14

## 2019-12-08 RX ADMIN — OXYCODONE AND ACETAMINOPHEN 2 TABLET(S): 5; 325 TABLET ORAL at 22:55

## 2019-12-08 RX ADMIN — Medication 3: at 17:50

## 2019-12-08 NOTE — PROGRESS NOTE ADULT - SUBJECTIVE AND OBJECTIVE BOX
Patient is seen and examined at the bed side, remains afebrile. He has no new complaints.        REVIEW OF SYSTEMS: All other review systems are negative        ALLERGIES: No Known Allergies        Vital Signs Last 24 Hrs  T(C): 36.5 (08 Dec 2019 14:01), Max: 36.6 (07 Dec 2019 21:46)  T(F): 97.7 (08 Dec 2019 14:01), Max: 97.9 (07 Dec 2019 21:46)  HR: 78 (08 Dec 2019 18:17) (76 - 98)  BP: 127/82 (08 Dec 2019 14:01) (124/82 - 147/83)  BP(mean): --  RR: 20 (08 Dec 2019 14:01) (19 - 20)  SpO2: 96% (08 Dec 2019 18:17) (93% - 99%)        PHYSICAL EXAM:  GENERAL: Not in distress, on oxygen  via NC  CHEST/LUNG:  Air entry bilaterally with No  wheezing   HEART: s1 and s2 present  ABDOMEN:  Nontender and  Nondistended  EXTREMITIES: No pedal  edema  CNS: Awake and Alert        LABS: No new Labs                            11.9   6.73  )-----------( 221      ( 04 Dec 2019 10:58 )             37.9                           13.2   6.93  )-----------( 197      ( 03 Dec 2019 09:37 )             41.1         12-04    134<L>  |  91<L>  |  21  ----------------------------<  416<H>  5.3   |  30  |  0.62    Ca    9.1      04 Dec 2019 10:58  Mg     2.3     12-04      TPro  7.4  /  Alb  4.3  /  TBili  0.1<L>  /  DBili  x   /  AST  15  /  ALT  21  /  AlkPhos  80  12-02    12-03    129<L>  |  89<L>  |  22  ----------------------------<  397<H>  5.4<H>   |  27  |  0.65    Ca    9.2      03 Dec 2019 12:37    TPro  7.4  /  Alb  4.3  /  TBili  0.1<L>  /  DBili  x   /  AST  15  /  ALT  21  /  AlkPhos  80  12-02    PT/INR - ( 02 Dec 2019 23:50 )   PT: 11.5 sec;   INR: 1.00 ratio     PTT - ( 02 Dec 2019 23:50 )  PTT:30.5 sec        CAPILLARY BLOOD GLUCOSE  POCT Blood Glucose.: 342 mg/dL (03 Dec 2019 18:44)  POCT Blood Glucose.: 316 mg/dL (03 Dec 2019 13:12)  POCT Blood Glucose.: 455 mg/dL (03 Dec 2019 09:19)  POCT Blood Glucose.: 463 mg/dL (03 Dec 2019 09:18)      MEDICATIONS  (STANDING):  albuterol/ipratropium for Nebulization 3 milliLiter(s) Nebulizer every 6 hours  aspirin enteric coated 81 milliGRAM(s) Oral daily  atorvastatin 40 milliGRAM(s) Oral at bedtime  budesonide 160 MICROgram(s)/formoterol 4.5 MICROgram(s) Inhaler 2 Puff(s) Inhalation two times a day  chlorhexidine 4% Liquid 1 Application(s) Topical <User Schedule>  clopidogrel Tablet 75 milliGRAM(s) Oral daily  enoxaparin Injectable 40 milliGRAM(s) SubCutaneous daily  famotidine    Tablet 20 milliGRAM(s) Oral daily  insulin glargine Injectable (LANTUS) 73 Unit(s) SubCutaneous every morning  insulin glargine Injectable (LANTUS) 75 Unit(s) SubCutaneous at bedtime  insulin lispro (HumaLOG) corrective regimen sliding scale   SubCutaneous three times a day before meals  insulin lispro (HumaLOG) corrective regimen sliding scale   SubCutaneous at bedtime  insulin lispro Injectable (HumaLOG) 13 Unit(s) SubCutaneous three times a day with meals  melatonin 5 milliGRAM(s) Oral at bedtime  predniSONE   Tablet 20 milliGRAM(s) Oral every 12 hours  risperiDONE   Tablet 0.5 milliGRAM(s) Oral three times a day    MEDICATIONS  (PRN):  ALPRAZolam 1 milliGRAM(s) Oral two times a day PRN panic attack  dextrose 40% Gel 15 Gram(s) Oral once PRN Blood Glucose LESS THAN 70 milliGRAM(s)/deciliter  glucagon  Injectable 1 milliGRAM(s) IntraMuscular once PRN Glucose LESS THAN 70 milligrams/deciliter  ibuprofen  Tablet. 800 milliGRAM(s) Oral three times a day PRN Moderate Pain (4 - 6)  lidocaine   Patch 1 Patch Transdermal daily PRN back pain  oxycodone    5 mG/acetaminophen 325 mG 2 Tablet(s) Oral every 6 hours PRN Severe Pain (7 - 10)          RADIOLOGY & ADDITIONAL TESTS:      12/3/19 : Xray Chest 1 View AP/PA (12.03.19 @ 00:41) Questionable small left pleural effusion..Prominent pulmonary artery,  which can be seen in pulmonary arterial hypertension.      12/3/19  CT Chest No Cont (12.03.19 @ 12:58) AIRWAYS, LUNGS and PLEURA: Patent central airways. Mild bronchial wall  thickening. No bronchiectasis. Moderate upper lung predominant emphysema.  Mild bandlike atelectasis within the right middle lobe and lingula.  Otherwise, the lungs are clear without consolidation.  No pleural effusion.      MICROBIOLOGY DATA:      Rapid Respiratory Viral Panel (12.02.19 @ 23:50)    Rapid RVP Result: Detected: This Respiratory Panel uses polymerase chain reaction (PCR) to detect for  adenovirus; coronavirus (HKU1, NL63, 229E, OC43); human metapneumovirus  (hMPV); human enterovirus/rhinovirus (Entero/RV); influenza A; influenza  A/H1; influenza A/H3; influenza A/H1-2009; influenza B; parainfluenza  viruses 1, 2, 3, 4; respiratory syncytial virus; Mycoplasma pneumoniae;  and Chlamydophila pneumoniae.

## 2019-12-08 NOTE — PROGRESS NOTE ADULT - SUBJECTIVE AND OBJECTIVE BOX
Dr. Juarez  Office [phone]  Cell [phone]  [place] JOHN  Cell [phone]      Patient is a 59y old  Male who presents with a chief complaint of SOB (08 Dec 2019 11:03)      Patient seen and examined at bedside. No chest pain/sob    VITALS:  T(F): 97.7 (12-08-19 @ 14:01), Max: 97.9 (12-07-19 @ 21:46)  HR: 96 (12-08-19 @ 14:01)  BP: 127/82 (12-08-19 @ 14:01)  RR: 20 (12-08-19 @ 14:01)  SpO2: 93% (12-08-19 @ 14:01)  Wt(kg): --    12-07 @ 07:01  -  12-08 @ 07:00  --------------------------------------------------------  IN: 480 mL / OUT: 0 mL / NET: 480 mL          PHYSICAL EXAM:  Constitutional: NAD  Neck: No JVD  Respiratory: CTAB, no wheezes, rales or rhonchi  Cardiovascular: S1, S2, RRR  Gastrointestinal: BS+, soft, NT/ND  Extremities: No peripheral edema    Hospital Medications:   MEDICATIONS  (STANDING):  albuterol/ipratropium for Nebulization 3 milliLiter(s) Nebulizer every 6 hours  aspirin enteric coated 81 milliGRAM(s) Oral daily  atorvastatin 40 milliGRAM(s) Oral at bedtime  budesonide 160 MICROgram(s)/formoterol 4.5 MICROgram(s) Inhaler 2 Puff(s) Inhalation two times a day  chlorhexidine 4% Liquid 1 Application(s) Topical <User Schedule>  clopidogrel Tablet 75 milliGRAM(s) Oral daily  dextrose 5%. 1000 milliLiter(s) (50 mL/Hr) IV Continuous <Continuous>  dextrose 50% Injectable 12.5 Gram(s) IV Push once  dextrose 50% Injectable 25 Gram(s) IV Push once  dextrose 50% Injectable 25 Gram(s) IV Push once  enoxaparin Injectable 40 milliGRAM(s) SubCutaneous daily  famotidine    Tablet 20 milliGRAM(s) Oral daily  insulin glargine Injectable (LANTUS) 75 Unit(s) SubCutaneous at bedtime  insulin glargine Injectable (LANTUS) 73 Unit(s) SubCutaneous every morning  insulin lispro (HumaLOG) corrective regimen sliding scale   SubCutaneous three times a day before meals  insulin lispro Injectable (HumaLOG) 13 Unit(s) SubCutaneous three times a day with meals  melatonin 5 milliGRAM(s) Oral at bedtime  nicotine -  14 mG/24Hr(s) Patch 1 patch Transdermal daily  predniSONE   Tablet 20 milliGRAM(s) Oral every 12 hours  risperiDONE   Tablet 0.5 milliGRAM(s) Oral three times a day      LABS:        Creatinine Trend: 0.62 <--, 0.65 <--, 0.63 <--, 0.73 <--, 0.86 <--            Urine Studies:    Urine Sodium <35      [12-04-19 @ 22:03]  Urine Osmolality 489      [12-04-19 @ 22:24]    HbA1c 10.4      [12-04-19 @ 08:53]        RADIOLOGY & ADDITIONAL STUDIES:

## 2019-12-08 NOTE — PROGRESS NOTE ADULT - ASSESSMENT
Patient is a 59y old  Male with COPD on 2L NC @ home, pulmonary HTN, CAD and stents, IDDM2, paranoid schizophrenia, anxiety,  now presents to the ER for evaluation of worsening SOB and cough for 1 week from long term NH.  He has been there for the past 8 months and states that he was not getting his nebs ATC and sometimes even PRN, thinks that exacerbated his SOB/COPD.  Active tobacco smoker, 2 cigs/day currently.  He has no  fever or chills,. ON arrival, he was tachycardic and tachypneic. The RVP detected RSV. The ID consult requested to assist with further evaluation and antibiotic management.     # Viral Pneumonia- RSV  # COPD Exacerbation    would recommend:    1.Taper off steroid as tolerated    2. Pain management as needed  3. OOB to chair/PT     -stable from ID stand point

## 2019-12-08 NOTE — PROGRESS NOTE ADULT - SUBJECTIVE AND OBJECTIVE BOX
Chief complaint  Patient is a 59y old  Male who presents with a chief complaint of SOB (08 Dec 2019 14:49)   Review of systems  Patient in bed, looks comfortable, no fever, no hypoglycemia.    Labs and Fingersticks  CAPILLARY BLOOD GLUCOSE      POCT Blood Glucose.: 253 mg/dL (08 Dec 2019 12:11)  POCT Blood Glucose.: 243 mg/dL (08 Dec 2019 09:48)  POCT Blood Glucose.: 218 mg/dL (07 Dec 2019 21:37)  POCT Blood Glucose.: 191 mg/dL (07 Dec 2019 18:03)                        Medications  MEDICATIONS  (STANDING):  albuterol/ipratropium for Nebulization 3 milliLiter(s) Nebulizer every 6 hours  aspirin enteric coated 81 milliGRAM(s) Oral daily  atorvastatin 40 milliGRAM(s) Oral at bedtime  budesonide 160 MICROgram(s)/formoterol 4.5 MICROgram(s) Inhaler 2 Puff(s) Inhalation two times a day  chlorhexidine 4% Liquid 1 Application(s) Topical <User Schedule>  clopidogrel Tablet 75 milliGRAM(s) Oral daily  dextrose 5%. 1000 milliLiter(s) (50 mL/Hr) IV Continuous <Continuous>  dextrose 50% Injectable 12.5 Gram(s) IV Push once  dextrose 50% Injectable 25 Gram(s) IV Push once  dextrose 50% Injectable 25 Gram(s) IV Push once  enoxaparin Injectable 40 milliGRAM(s) SubCutaneous daily  famotidine    Tablet 20 milliGRAM(s) Oral daily  insulin glargine Injectable (LANTUS) 75 Unit(s) SubCutaneous at bedtime  insulin glargine Injectable (LANTUS) 73 Unit(s) SubCutaneous every morning  insulin lispro (HumaLOG) corrective regimen sliding scale   SubCutaneous three times a day before meals  insulin lispro Injectable (HumaLOG) 13 Unit(s) SubCutaneous three times a day with meals  melatonin 5 milliGRAM(s) Oral at bedtime  nicotine -  14 mG/24Hr(s) Patch 1 patch Transdermal daily  predniSONE   Tablet 20 milliGRAM(s) Oral every 12 hours  risperiDONE   Tablet 0.5 milliGRAM(s) Oral three times a day      Physical Exam  General: Patient comfortable in bed  Vital Signs Last 12 Hrs  T(F): 97.7 (12-08-19 @ 14:01), Max: 97.7 (12-08-19 @ 14:01)  HR: 96 (12-08-19 @ 14:01) (78 - 96)  BP: 127/82 (12-08-19 @ 14:01) (127/82 - 147/83)  BP(mean): --  RR: 20 (12-08-19 @ 14:01) (20 - 20)  SpO2: 93% (12-08-19 @ 14:01) (93% - 99%)  Neck: No palpable thyroid nodules.  CVS: S1S2, No murmurs  Respiratory: No wheezing, no crepitations  GI: Abdomen soft, bowel sounds positive  Musculoskeletal:  edema lower extremities.   Skin: No skin rashes, no ecchymosis    Diagnostics Chief complaint  Patient is a 59y old  Male who presents with a chief complaint of SOB (08 Dec 2019 14:49)   Review of systems  Patient in bed, looks comfortable, no fever,  no hypoglycemia.    Labs and Fingersticks  CAPILLARY BLOOD GLUCOSE      POCT Blood Glucose.: 253 mg/dL (08 Dec 2019 12:11)  POCT Blood Glucose.: 243 mg/dL (08 Dec 2019 09:48)  POCT Blood Glucose.: 218 mg/dL (07 Dec 2019 21:37)  POCT Blood Glucose.: 191 mg/dL (07 Dec 2019 18:03)    Medications  MEDICATIONS  (STANDING):  albuterol/ipratropium for Nebulization 3 milliLiter(s) Nebulizer every 6 hours  aspirin enteric coated 81 milliGRAM(s) Oral daily  atorvastatin 40 milliGRAM(s) Oral at bedtime  budesonide 160 MICROgram(s)/formoterol 4.5 MICROgram(s) Inhaler 2 Puff(s) Inhalation two times a day  chlorhexidine 4% Liquid 1 Application(s) Topical <User Schedule>  clopidogrel Tablet 75 milliGRAM(s) Oral daily  dextrose 5%. 1000 milliLiter(s) (50 mL/Hr) IV Continuous <Continuous>  dextrose 50% Injectable 12.5 Gram(s) IV Push once  dextrose 50% Injectable 25 Gram(s) IV Push once  dextrose 50% Injectable 25 Gram(s) IV Push once  enoxaparin Injectable 40 milliGRAM(s) SubCutaneous daily  famotidine    Tablet 20 milliGRAM(s) Oral daily  insulin glargine Injectable (LANTUS) 75 Unit(s) SubCutaneous at bedtime  insulin glargine Injectable (LANTUS) 73 Unit(s) SubCutaneous every morning  insulin lispro (HumaLOG) corrective regimen sliding scale   SubCutaneous three times a day before meals  insulin lispro Injectable (HumaLOG) 13 Unit(s) SubCutaneous three times a day with meals  melatonin 5 milliGRAM(s) Oral at bedtime  nicotine -  14 mG/24Hr(s) Patch 1 patch Transdermal daily  predniSONE   Tablet 20 milliGRAM(s) Oral every 12 hours  risperiDONE   Tablet 0.5 milliGRAM(s) Oral three times a day      Physical Exam  General: Patient comfortable in bed  Vital Signs Last 12 Hrs  T(F): 97.7 (12-08-19 @ 14:01), Max: 97.7 (12-08-19 @ 14:01)  HR: 96 (12-08-19 @ 14:01) (78 - 96)  BP: 127/82 (12-08-19 @ 14:01) (127/82 - 147/83)  BP(mean): --  RR: 20 (12-08-19 @ 14:01) (20 - 20)  SpO2: 93% (12-08-19 @ 14:01) (93% - 99%)  Neck: No palpable thyroid nodules.  CVS: S1S2, No murmurs  Respiratory: No wheezing, no crepitations  GI: Abdomen soft, bowel sounds positive  Musculoskeletal:  edema lower extremities.   Skin: No skin rashes, no ecchymosis    Diagnostics

## 2019-12-08 NOTE — PROVIDER CONTACT NOTE (OTHER) - ACTION/TREATMENT ORDERED:
PA aware and will d/c order.
PA to order 1x dose of Oxycodone
Administer HS Lantus as ordered. Monitor PT for S/S of hyperglycemia no further change in treatment at this time will continue to assess
Refusal noted

## 2019-12-08 NOTE — PROVIDER CONTACT NOTE (OTHER) - SITUATION
Pt is c/o back pain and asking for percocet. He got 1000mg Tylenol at 6:15 this morning and there is no medication ordered for breakthrough.
Pt refused the nicotine patch - he said it gives him nightmares. He does not need anything at this time for nicotine withdrawal/cravings.
PT has a 
Patient refuses bed alarm, throws chair alarm across room

## 2019-12-08 NOTE — PROGRESS NOTE ADULT - PROBLEM SELECTOR PLAN 1
- improving accordingly. C/w PO prednisone with slow taper.    -> Pt remains medically cleared and optimized for safe discharge. Awaiting case management for arranging safe discharge   All team members management and pt care appreciated

## 2019-12-08 NOTE — PROGRESS NOTE ADULT - SUBJECTIVE AND OBJECTIVE BOX
Patient is a 59y old  Male who presents with a chief complaint of SOB (07 Dec 2019 22:31)    Any change in ROS: doing ok. Cough (+), sputum (+) no sob     MEDICATIONS  (STANDING):  albuterol/ipratropium for Nebulization 3 milliLiter(s) Nebulizer every 6 hours  aspirin enteric coated 81 milliGRAM(s) Oral daily  atorvastatin 40 milliGRAM(s) Oral at bedtime  budesonide 160 MICROgram(s)/formoterol 4.5 MICROgram(s) Inhaler 2 Puff(s) Inhalation two times a day  chlorhexidine 4% Liquid 1 Application(s) Topical <User Schedule>  clopidogrel Tablet 75 milliGRAM(s) Oral daily  dextrose 5%. 1000 milliLiter(s) (50 mL/Hr) IV Continuous <Continuous>  dextrose 50% Injectable 12.5 Gram(s) IV Push once  dextrose 50% Injectable 25 Gram(s) IV Push once  dextrose 50% Injectable 25 Gram(s) IV Push once  enoxaparin Injectable 40 milliGRAM(s) SubCutaneous daily  famotidine    Tablet 20 milliGRAM(s) Oral daily  insulin glargine Injectable (LANTUS) 75 Unit(s) SubCutaneous at bedtime  insulin glargine Injectable (LANTUS) 70 Unit(s) SubCutaneous every morning  insulin lispro (HumaLOG) corrective regimen sliding scale   SubCutaneous three times a day before meals  insulin lispro Injectable (HumaLOG) 10 Unit(s) SubCutaneous three times a day before meals  melatonin 5 milliGRAM(s) Oral at bedtime  nicotine -  14 mG/24Hr(s) Patch 1 patch Transdermal daily  predniSONE   Tablet 20 milliGRAM(s) Oral every 12 hours  risperiDONE   Tablet 0.5 milliGRAM(s) Oral three times a day  sodium chloride 0.9%. 1000 milliLiter(s) (60 mL/Hr) IV Continuous <Continuous>    MEDICATIONS  (PRN):  acetaminophen   Tablet .. 1000 milliGRAM(s) Oral every 8 hours PRN Moderate Pain (4 - 6)  ALPRAZolam 0.5 milliGRAM(s) Oral two times a day PRN panic attack  dextrose 40% Gel 15 Gram(s) Oral once PRN Blood Glucose LESS THAN 70 milliGRAM(s)/deciliter  glucagon  Injectable 1 milliGRAM(s) IntraMuscular once PRN Glucose LESS THAN 70 milligrams/deciliter  ibuprofen  Tablet. 800 milliGRAM(s) Oral three times a day PRN Severe Pain (7 - 10)  lidocaine   Patch 1 Patch Transdermal daily PRN back pain  oxycodone    5 mG/acetaminophen 325 mG 2 Tablet(s) Oral every 6 hours PRN Severe Pain (7 - 10)    Vital Signs Last 24 Hrs  T(C): 36.2 (08 Dec 2019 06:23), Max: 36.6 (07 Dec 2019 14:11)  T(F): 97.2 (08 Dec 2019 06:23), Max: 97.9 (07 Dec 2019 21:46)  HR: 96 (08 Dec 2019 06:23) (72 - 98)  BP: 147/83 (08 Dec 2019 06:23) (124/82 - 147/83)  BP(mean): --  RR: 20 (08 Dec 2019 06:23) (19 - 20)  SpO2: 97% (08 Dec 2019 06:23) (95% - 100%)    I&O's Summary    07 Dec 2019 07:01  -  08 Dec 2019 07:00  --------------------------------------------------------  IN: 480 mL / OUT: 0 mL / NET: 480 mL          Physical Exam:   GENERAL: The patient comfortable with no apparent distress.   HEENT: Head is normocephalic and atraumatic.    NECK: Supple with no elevated JVP.  LUNGS: diminished   HEART: S1 and S2 present without murmur.  ABDOMEN: Soft, nontender, and nondistended.   EXTREMITIES: No edema or calf tenderness.  NEUROLOGIC: Grossly intact.    Labs:  33                            11.9   6.73  )-----------( 221      ( 04 Dec 2019 10:58 )             37.9     12-04    134<L>  |  91<L>  |  21  ----------------------------<  416<H>  5.3   |  30  |  0.62        CAPILLARY BLOOD GLUCOSE      POCT Blood Glucose.: 218 mg/dL (07 Dec 2019 21:37)  POCT Blood Glucose.: 191 mg/dL (07 Dec 2019 18:03)  POCT Blood Glucose.: 183 mg/dL (07 Dec 2019 13:38)  POCT Blood Glucose.: 87 mg/dL (07 Dec 2019 09:36)        Studies  Chest X-RAY  < from: Xray Chest 1 View AP/PA (12.03.19 @ 00:41) >    EXAM:  XR CHEST AP OR PA 1V                            PROCEDURE DATE:  12/03/2019            INTERPRETATION:  CLINICAL INDICATION: Shortness of breath.    EXAM: Frontal view of the chest with comparison made to chest radiograph   on 5/7/2019    FINDINGS:   The heart size is normal. Prominent pulmonary artery, which can be seen   in pulmonary arterial hypertension.  The left hemidiaphragm is obscured which may be secondary to a small left   pleural effusion. No focal consolidation is seen.  Thebones are unremarkable.    IMPRESSION:   Questionable small left pleural effusion..Prominent pulmonary artery,   which can be seen in pulmonary arterial hypertension.    < end of copied text >      CT SCAN Chest   < from: CT Chest No Cont (12.03.19 @ 12:58) >    EXAM:  CT CHEST                            PROCEDURE DATE:  12/03/2019            INTERPRETATION:  CLINICAL INFORMATION: Shortness of breath    COMPARISON: CT chest 10/5/2018    PROCEDURE:   CT of the Chest was performed without intravenous contrast.  Sagittal and coronal reformats were performed.      FINDINGS:    AIRWAYS, LUNGS and PLEURA: Patent central airways. Mild bronchial wall   thickening. No bronchiectasis. Moderate upper lung predominant emphysema.   Mild bandlike atelectasis within the right middle lobe and lingula.   Otherwise, the lungs are clear without consolidation.    No pleural effusion.    MEDIASTINUM AND [place]: No lymphadenopathy.    HEART AND VESSELS: Heart size is normal. No pericardial effusion.   Thoracic aorta and pulmonary artery are normal in diameter. Moderate   calcific coronary atherosclerosis.    VISUALIZED UPPER ABDOMEN: Too small to characterize hypodensity within   the right lobe of the liver (series 2 image 109), unchanged.    CHEST WALL AND BONES: No aggressive osseous lesion. Bilateral   gynecomastia. Old fracture deformity of posterior lower ribs.    LOWER NECK: Within normal limits.    IMPRESSION:     No pneumonia.    Emphysema.        < end of copied text >  Venous Dopplers: LE: n/a   CT Abdomen  n/a   Others  < from: TTE Echo Complete w/Doppler (10.04.18 @ 11:54) >    EXAM:  ECHO TRANSTHORACIC COMP W DOPP      PROCEDURE DATE:  Oct  4 2018   .      INTERPRETATION:  REPORT:    TRANSTHORACIC ECHOCARDIOGRAM REPORT         Patient Name:   DEVIKA CARBALLO Patient Location: Inpatient  Medical Rec #:  [id number]      Accession #:      [id number]  Account #:                    Height:           68.0 in 172.7 cm  YOB: 1960     Weight:           200.0 lb 90.72 kg  Patient Age:    58 years      BSA:              2.04 m²  Patient Gender: M             BP:        102/61 mmHg       Date of Exam:        10/4/2018 11:54:09 AM  Sonographer:         Michelle Tolentino  Referring Physician: Ana Laura Dahl MD    Procedure:     2D Echo/Doppler/Color Doppler Complete.  Indications:   Unspecified combined systolic (congestive) and diastolic                 (congestive) heart failure - I50.40  Diagnosis:     Unspecified combined systolic (congestive) and diastolic                 (congestive) heart failure - I50.40  Study Details: Technically adequate study. Study quality was adversely   affected                 due to patient obesity, body habitus, COPD and lung   interference.         2D AND M-MODE MEASUREMENTS (normal ranges within parentheses):  Left                Normal    Aorta/Left          Normal  Ventricle:                    Atrium:  IVSd (2D):    1.08  (0.7-1.1) Aortic Root  3.20   (2.4-3.7)                cm              (Mmode):     cm  LVPWd (2D):   1.19  (0.7-1.1) Left Atrium  3.70   (1.9-4.0)                cm              (Mmode):cm  LVIDd (2D):   4.52  (3.4-5.7) LA Volume    12.5                cm              Index        ml/m²  LVIDs (2D):   3.10                cm  LV FS (2D):   31.4  (>25%)                %  Relative Wall 0.53  (<0.42)  Thickness    LV SYSTOLIC FUNCTION BY 2D PLANIMETRY (MOD):  EF-A4C View: 78.5 % EF-A2C View: 78.0 % EF-Biplane: 79 %    LV DIASTOLIC FUNCTION:  MV Peak E: 0.46 m/s E/e' Ratio: 8.40  MV Peak A: 0.70 m/s  E/A Ratio: 0.67    SPECTRAL DOPPLER ANALYSIS (where applicable):  LVOT Vmax:  LVOT VTI:  LVOT Diameter: 1.97 cm    Tricuspid Valve and PA/RV Systolic Pressure: TR Max Velocity: 3.65 m/s RA   Pressure: 3 mmHg RVSP/PASP: 56.3 mmHg       PHYSICIAN INTERPRETATION:  Left Ventricle: The left ventricular internal cavity size is normal.  Global LV systolic function was hyperdynamic. Left ventricular ejection   fraction, by visual estimation, is >75%. The interventricular septum is   flattened in systole and diastole, consistent with right ventricular   pressure and volume overload. Spectral Doppler shows impaired relaxation   pattern of left ventricular myocardial filling (Grade I diastolic   dysfunction).  Right Ventricle: The right ventricular size is severely enlarged. RV   systolic function is severely reduced.  Left Atrium: Normal left atrial size.  Right Atrium: Severely enlarged right atrium.  Pericardium: There is no evidence of pericardial effusion.  Mitral Valve: Structurally normal mitral valve, with normal leaflet   excursion. Trace mitral valve regurgitation is seen.  Tricuspid Valve: The tricuspid valve is normal. Moderate tricuspid   regurgitation is visualized. Estimated pulmonary artery systolic pressure   is 56.3 mmHg assuming a right atrial pressure of 3 mmHg, which is   consistent with moderate pulmonary hypertension.  Aortic Valve: The aortic valve is trileaflet. Sclerotic aortic valve with   normal opening. No evidence of aortic valve regurgitation is seen.  Pulmonic Valve: Structurally normal pulmonic valve, with normal leaflet   excursion. Trace pulmonic valve regurgitation.  Aorta: The aortic root and ascending aorta are structurally normal, with   no evidence of dilitation.  Pulmonary Artery: The pulmonary artery is mildly dilated.  Venous: The inferior vena cava was normal sized, with respiratory size   variation greater than 50%.  In comparison to the previous echocardiogram(s): There are no prior   studies on this patient for comparison purposes. Findings are suggestive   of acute pulmonary embolism.       Summary:   1. Normal left atrial size.   2. Hyperdynamic wall motion.   3. Left ventricular ejection fraction, by visual estimation, is >75%.   Grade I diastolic dysfunction.   4. Severely enlarged right atrium.   5. Severely enlarged right ventricle. Severely reduced RV systolic  function.   6. Moderate tricuspid regurgitation.   7. Estimated pulmonary artery systolic pressure is 56.3 mmHg -moderate   pulmonary hypertension.   8. There is no evidence of pericardial effusion.   9. Findings are suggestive of acute pulmonary embolism.    [id number] Moshe Martínez MD, RPVI, Electronically signed on 12/7/2018 at   4:03:19 PM              *** Final ***            < end of copied text >        I

## 2019-12-08 NOTE — PROGRESS NOTE ADULT - PROBLEM SELECTOR PLAN 1
Will increase Humalog to 13u before each meal.  Will continue Lantus as well as Humalog correction scale coverage; Will continue monitoring FS and FU.  Patient counseled for compliance with consistent low carb diet and exercise as tolerated outpatient.

## 2019-12-08 NOTE — PROGRESS NOTE ADULT - ASSESSMENT
Assessment  DMT2: 59y Male with DM T2 with hyperglycemia, A1C 10.4%, on large dose insulin, blood sugars improving though still elevated in the setting of steroid management, no hypoglycemic episode, patient is eating meals, comfortable, state back pain is improved with pain meds.  COPD Exacerbation: on medications and nebulizer treatment, stable, monitored.  HTN: Controlled,  on antihypertensive medications.  HLD: Controlled, on statin.  Schizophrenia: On meds.  Obesity: No strict exercise routines, not on any weight loss program, neither on low calorie diet.          Laury Romero MD  Cell: [phone]  Office: [phone]

## 2019-12-08 NOTE — CONSULT NOTE ADULT - SUBJECTIVE AND OBJECTIVE BOX
CHIEF COMPLAINT:Patient is a 59y old  Male who presents with a chief complaint of SOB (08 Dec 2019 21:18)      HISTORY OF PRESENT ILLNESS:HPI:  59 year-old male with history of COPD on 2L NC @ home, pulmonary HTN, CAD and stents, IDDM2, paranoid schizophrenia, anxietyp/w worsening SOB and cough for 1 week from long term NH.  He has been there for the past 8 months and states that he was not getting his nebs ATC and sometimes even PRN, thinks that exacerbated his SOB/COPD.  Active tobacco smoker, 2 cigs/day currently.  Denies fevers, chills, nausea, vomiting, abdominal pain, LE edema or sick contact. Wants Benzo to calm him down and to sleep. (03 Dec 2019 04:54)      PAST MEDICAL & SURGICAL HISTORY:  Oxygen dependent  Gastroesophageal reflux disease, esophagitis presence not specified  Smoker  Bipolar 1 disorder  Coronary artery disease involving native coronary artery of native heart without angina pectoris  Type 2 diabetes mellitus without complication, with long-term current use of insulin  Pulmonary HTN  HLD (hyperlipidemia)  Stented coronary artery  COPD (chronic obstructive pulmonary disease)  No significant past surgical history          MEDICATIONS:  aspirin enteric coated 81 milliGRAM(s) Oral daily  clopidogrel Tablet 75 milliGRAM(s) Oral daily  enoxaparin Injectable 40 milliGRAM(s) SubCutaneous daily      albuterol/ipratropium for Nebulization 3 milliLiter(s) Nebulizer every 6 hours  budesonide 160 MICROgram(s)/formoterol 4.5 MICROgram(s) Inhaler 2 Puff(s) Inhalation two times a day    ALPRAZolam 1 milliGRAM(s) Oral two times a day PRN  ibuprofen  Tablet. 800 milliGRAM(s) Oral three times a day PRN  melatonin 5 milliGRAM(s) Oral at bedtime  oxycodone    5 mG/acetaminophen 325 mG 2 Tablet(s) Oral every 6 hours PRN  risperiDONE   Tablet 0.5 milliGRAM(s) Oral three times a day    famotidine    Tablet 20 milliGRAM(s) Oral daily    atorvastatin 40 milliGRAM(s) Oral at bedtime  dextrose 40% Gel 15 Gram(s) Oral once PRN  dextrose 50% Injectable 12.5 Gram(s) IV Push once  dextrose 50% Injectable 25 Gram(s) IV Push once  dextrose 50% Injectable 25 Gram(s) IV Push once  glucagon  Injectable 1 milliGRAM(s) IntraMuscular once PRN  insulin glargine Injectable (LANTUS) 73 Unit(s) SubCutaneous every morning  insulin glargine Injectable (LANTUS) 75 Unit(s) SubCutaneous at bedtime  insulin lispro (HumaLOG) corrective regimen sliding scale   SubCutaneous three times a day before meals  insulin lispro (HumaLOG) corrective regimen sliding scale   SubCutaneous at bedtime  insulin lispro Injectable (HumaLOG) 13 Unit(s) SubCutaneous three times a day with meals  predniSONE   Tablet 20 milliGRAM(s) Oral every 12 hours    chlorhexidine 4% Liquid 1 Application(s) Topical <User Schedule>  dextrose 5%. 1000 milliLiter(s) IV Continuous <Continuous>  lidocaine   Patch 1 Patch Transdermal daily PRN      FAMILY HISTORY:  No family history of mental disorder  No family history of hypertension  No family history of chronic obstructive pulmonary disease  No family history of cardiovascular disease      Non-contributory    SOCIAL HISTORY:    [ ] Tobacco  [ ] Drugs  [ ] Alcohol    Allergies    No Known Allergies    Intolerances    	    REVIEW OF SYSTEMS:  CONSTITUTIONAL: No fever  EYES: No eye pain, visual disturbances, or discharge  ENMT:  No difficulty hearing, tinnitus  NECK: No pain or stiffness  RESPIRATORY: No cough, wheezing,  CARDIOVASCULAR: No chest pain, palpitations, passing out, dizziness, or leg swelling  GASTROINTESTINAL:  No nausea, vomiting, diarrhea or constipation. No melena.  GENITOURINARY: No dysuria, hematuria  NEUROLOGICAL: No stroke like symptoms  SKIN: No burning or lesions   ENDOCRINE: No heat or cold intolerance  MUSCULOSKELETAL: No joint pain or swelling  PSYCHIATRIC: No  anxiety, mood swings  HEME/LYMPH: No bleeding gums  ALLERGY AND IMMUNOLOGIC: No hives or eczema	    All other ROS negative    PHYSICAL EXAM:  T(C): 36.5 (12-08-19 @ 14:01), Max: 36.5 (12-08-19 @ 14:01)  HR: 87 (12-08-19 @ 23:13) (78 - 96)  BP: 127/82 (12-08-19 @ 14:01) (127/82 - 147/83)  RR: 20 (12-08-19 @ 14:01) (20 - 20)  SpO2: 97% (12-08-19 @ 23:13) (93% - 99%)  Wt(kg): --  I&O's Summary    07 Dec 2019 07:01  -  08 Dec 2019 07:00  --------------------------------------------------------  IN: 480 mL / OUT: 0 mL / NET: 480 mL        Appearance: Normal	  HEENT:   Normal oral mucosa, EOMI	  Cardiovascular: Normal S1 S2, No JVD, No murmurs  Respiratory: Lungs clear to auscultation	  Psychiatry: Alert  Gastrointestinal:  Soft, Non-tender, + BS	  Skin: No rashes   Neurologic: Non-focal  Extremities:  No edema  Vascular: Peripheral pulses palpable    	    	  	  CARDIAC MARKERS:  Labs personally reviewed by me                          EKG: Personally reviewed by me -   Radiology: Personally reviewed by me -       Assessment /Plan:     sob appears to be 2/2 COPD, does not appear to be cardiac     Shakir Barbour DO [facility]  Cardiovascular Medicine  [phone]

## 2019-12-08 NOTE — PROGRESS NOTE ADULT - PROBLEM SELECTOR PLAN 1
Still feels sob: BUT NOT MUCH OF WHEEZING: He is an active smoker: Cont steroids as wellas BD and oxygen: HIS VENOUS ABG IS REVIEWED: HE LIKELY HAS CHR RETENTION AND MILD AC ON CHR HYPERCARBIC RESP FAILURE: WILL REPEAT HIS abg IN am: HE SEEMS COMFORTABLE: I DON'T THINK HE NEEDS BIPAP at this time  12/5: doing much better: change to po steroids and decrease the dose: no wheezing  12/6: Mild ac on chr hypercarbic resp failure: secondary to copd exacerbation: doing pretty well: alert and awake: no need for bipap  12/7 stable. continue current management  12/8 stable. diminished breath sounds no wheezing. wean off oxygen. goal of O2 sat greater than 88%

## 2019-12-08 NOTE — PROGRESS NOTE ADULT - PROBLEM SELECTOR PLAN 2
secondary to steroids: cotn insulin  12/5: cont to control per endo  12/*6: monitor and control per endo  12/8 Endo note appreciated   12/7 FS with sliding scale. Endo is following

## 2019-12-08 NOTE — PROGRESS NOTE ADULT - SUBJECTIVE AND OBJECTIVE BOX
Patient seen and examined at bedside  No acute events noted overnight  Case discussed with medical team    HPI:  59 year-old male with history of COPD on 2L NC @ home, pulmonary HTN, CAD and stents, IDDM2, paranoid schizophrenia, anxietyp/w worsening SOB and cough for 1 week from long term NH.  He has been there for the past 8 months and states that he was not getting his nebs ATC and sometimes even PRN, thinks that exacerbated his SOB/COPD.  Active tobacco smoker, 2 cigs/day currently.  Denies fevers, chills, nausea, vomiting, abdominal pain, LE edema or sick contact. Wants Benzo to calm him down and to sleep. (03 Dec 2019 04:54)      PAST MEDICAL & SURGICAL HISTORY:  Oxygen dependent  Gastroesophageal reflux disease, esophagitis presence not specified  Smoker  Bipolar 1 disorder  Coronary artery disease involving native coronary artery of native heart without angina pectoris  Type 2 diabetes mellitus without complication, with long-term current use of insulin  Pulmonary HTN  HLD (hyperlipidemia)  Stented coronary artery  COPD (chronic obstructive pulmonary disease)  No significant past surgical history      No Known Allergies       MEDICATIONS  (STANDING):  albuterol/ipratropium for Nebulization 3 milliLiter(s) Nebulizer every 6 hours  aspirin enteric coated 81 milliGRAM(s) Oral daily  atorvastatin 40 milliGRAM(s) Oral at bedtime  budesonide 160 MICROgram(s)/formoterol 4.5 MICROgram(s) Inhaler 2 Puff(s) Inhalation two times a day  chlorhexidine 4% Liquid 1 Application(s) Topical <User Schedule>  clopidogrel Tablet 75 milliGRAM(s) Oral daily  dextrose 5%. 1000 milliLiter(s) (50 mL/Hr) IV Continuous <Continuous>  dextrose 50% Injectable 12.5 Gram(s) IV Push once  dextrose 50% Injectable 25 Gram(s) IV Push once  dextrose 50% Injectable 25 Gram(s) IV Push once  enoxaparin Injectable 40 milliGRAM(s) SubCutaneous daily  famotidine    Tablet 20 milliGRAM(s) Oral daily  insulin glargine Injectable (LANTUS) 75 Unit(s) SubCutaneous at bedtime  insulin glargine Injectable (LANTUS) 73 Unit(s) SubCutaneous every morning  insulin lispro (HumaLOG) corrective regimen sliding scale   SubCutaneous three times a day before meals  insulin lispro Injectable (HumaLOG) 10 Unit(s) SubCutaneous three times a day before meals  melatonin 5 milliGRAM(s) Oral at bedtime  nicotine -  14 mG/24Hr(s) Patch 1 patch Transdermal daily  predniSONE   Tablet 20 milliGRAM(s) Oral every 12 hours  risperiDONE   Tablet 0.5 milliGRAM(s) Oral three times a day    MEDICATIONS  (PRN):  ALPRAZolam 1 milliGRAM(s) Oral two times a day PRN panic attack  dextrose 40% Gel 15 Gram(s) Oral once PRN Blood Glucose LESS THAN 70 milliGRAM(s)/deciliter  glucagon  Injectable 1 milliGRAM(s) IntraMuscular once PRN Glucose LESS THAN 70 milligrams/deciliter  ibuprofen  Tablet. 800 milliGRAM(s) Oral three times a day PRN Severe Pain (7 - 10)  lidocaine   Patch 1 Patch Transdermal daily PRN back pain  oxycodone    5 mG/acetaminophen 325 mG 2 Tablet(s) Oral every 6 hours PRN Severe Pain (7 - 10)      REVIEW OF SYSTEMS:  CONSTITUTIONAL: (+) malaise.   EYES: No acute change in vision   ENT:  No tinnitus  NECK: No stiffness  RESPIRATORY: No hemoptysis  CARDIOVASCULAR: No chest pain, palpitations, syncope  GASTROINTESTINAL: No hematemesis, diarrhea, melena, or hematochezia.  GENITOURINARY: No hematuria  NEUROLOGICAL: No headaches  LYMPH Nodes: No enlarged glands  ENDOCRINE: No heat or cold intolerance	    T(C): 36.2 (12-08-19 @ 06:23), Max: 36.6 (12-07-19 @ 14:11)  HR: 96 (12-08-19 @ 06:23) (72 - 98)  BP: 147/83 (12-08-19 @ 06:23) (124/82 - 147/83)  RR: 20 (12-08-19 @ 06:23) (19 - 20)  SpO2: 97% (12-08-19 @ 06:23) (95% - 100%)    PHYSICAL EXAMINATION:   Constitutional: WD, NAD  HEENT: NC, AT  Neck:  Supple  Respiratory:  Adequate airflow b/l. Not using accessory muscles of respiration.  Cardiovascular:  S1 & S2 intact, no R/G, 2+ radial pulses b/l  Gastrointestinal: Soft, NT, ND, normoactive b.s., no organomegaly/RT/rigidity  Extremities: WWP  Neurological:  Alert and awake.  No acute focal motor deficits. Crude sensation intact.     Labs and imaging reviewed    LABS:                  CAPILLARY BLOOD GLUCOSE      POCT Blood Glucose.: 243 mg/dL (08 Dec 2019 09:48)  POCT Blood Glucose.: 218 mg/dL (07 Dec 2019 21:37)  POCT Blood Glucose.: 191 mg/dL (07 Dec 2019 18:03)  POCT Blood Glucose.: 183 mg/dL (07 Dec 2019 13:38)              RADIOLOGY & ADDITIONAL STUDIES:

## 2019-12-09 LAB
GLUCOSE BLDC GLUCOMTR-MCNC: 148 MG/DL — HIGH (ref 70–99)
GLUCOSE BLDC GLUCOMTR-MCNC: 263 MG/DL — HIGH (ref 70–99)
GLUCOSE BLDC GLUCOMTR-MCNC: 295 MG/DL — HIGH (ref 70–99)
GLUCOSE BLDC GLUCOMTR-MCNC: 320 MG/DL — HIGH (ref 70–99)

## 2019-12-09 RX ORDER — ALPRAZOLAM 0.25 MG
1 TABLET ORAL EVERY 8 HOURS
Refills: 0 | Status: DISCONTINUED | OUTPATIENT
Start: 2019-12-09 | End: 2019-12-12

## 2019-12-09 RX ADMIN — RISPERIDONE 0.5 MILLIGRAM(S): 4 TABLET ORAL at 06:06

## 2019-12-09 RX ADMIN — Medication 13 UNIT(S): at 17:41

## 2019-12-09 RX ADMIN — Medication 800 MILLIGRAM(S): at 02:50

## 2019-12-09 RX ADMIN — OXYCODONE AND ACETAMINOPHEN 2 TABLET(S): 5; 325 TABLET ORAL at 18:21

## 2019-12-09 RX ADMIN — Medication 800 MILLIGRAM(S): at 22:25

## 2019-12-09 RX ADMIN — Medication 1 MILLIGRAM(S): at 00:04

## 2019-12-09 RX ADMIN — RISPERIDONE 0.5 MILLIGRAM(S): 4 TABLET ORAL at 13:06

## 2019-12-09 RX ADMIN — OXYCODONE AND ACETAMINOPHEN 2 TABLET(S): 5; 325 TABLET ORAL at 09:50

## 2019-12-09 RX ADMIN — Medication 3: at 12:00

## 2019-12-09 RX ADMIN — Medication 800 MILLIGRAM(S): at 14:05

## 2019-12-09 RX ADMIN — FAMOTIDINE 20 MILLIGRAM(S): 10 INJECTION INTRAVENOUS at 11:11

## 2019-12-09 RX ADMIN — Medication 4: at 17:41

## 2019-12-09 RX ADMIN — Medication 800 MILLIGRAM(S): at 13:05

## 2019-12-09 RX ADMIN — OXYCODONE AND ACETAMINOPHEN 2 TABLET(S): 5; 325 TABLET ORAL at 17:06

## 2019-12-09 RX ADMIN — RISPERIDONE 0.5 MILLIGRAM(S): 4 TABLET ORAL at 22:23

## 2019-12-09 RX ADMIN — Medication 800 MILLIGRAM(S): at 21:07

## 2019-12-09 RX ADMIN — Medication 1: at 22:24

## 2019-12-09 RX ADMIN — Medication 800 MILLIGRAM(S): at 01:50

## 2019-12-09 RX ADMIN — BUDESONIDE AND FORMOTEROL FUMARATE DIHYDRATE 2 PUFF(S): 160; 4.5 AEROSOL RESPIRATORY (INHALATION) at 06:00

## 2019-12-09 RX ADMIN — Medication 20 MILLIGRAM(S): at 06:06

## 2019-12-09 RX ADMIN — INSULIN GLARGINE 73 UNIT(S): 100 INJECTION, SOLUTION SUBCUTANEOUS at 08:47

## 2019-12-09 RX ADMIN — Medication 1 MILLIGRAM(S): at 13:05

## 2019-12-09 RX ADMIN — INSULIN GLARGINE 75 UNIT(S): 100 INJECTION, SOLUTION SUBCUTANEOUS at 22:23

## 2019-12-09 RX ADMIN — Medication 3 MILLILITER(S): at 23:56

## 2019-12-09 RX ADMIN — Medication 3 MILLILITER(S): at 12:38

## 2019-12-09 RX ADMIN — ATORVASTATIN CALCIUM 40 MILLIGRAM(S): 80 TABLET, FILM COATED ORAL at 22:23

## 2019-12-09 RX ADMIN — Medication 81 MILLIGRAM(S): at 11:11

## 2019-12-09 RX ADMIN — Medication 13 UNIT(S): at 12:00

## 2019-12-09 RX ADMIN — Medication 1 MILLIGRAM(S): at 21:06

## 2019-12-09 RX ADMIN — OXYCODONE AND ACETAMINOPHEN 2 TABLET(S): 5; 325 TABLET ORAL at 08:51

## 2019-12-09 RX ADMIN — ENOXAPARIN SODIUM 40 MILLIGRAM(S): 100 INJECTION SUBCUTANEOUS at 11:11

## 2019-12-09 RX ADMIN — OXYCODONE AND ACETAMINOPHEN 2 TABLET(S): 5; 325 TABLET ORAL at 23:47

## 2019-12-09 RX ADMIN — CHLORHEXIDINE GLUCONATE 1 APPLICATION(S): 213 SOLUTION TOPICAL at 11:13

## 2019-12-09 RX ADMIN — Medication 3 MILLILITER(S): at 06:00

## 2019-12-09 RX ADMIN — Medication 3 MILLILITER(S): at 17:39

## 2019-12-09 RX ADMIN — BUDESONIDE AND FORMOTEROL FUMARATE DIHYDRATE 2 PUFF(S): 160; 4.5 AEROSOL RESPIRATORY (INHALATION) at 17:39

## 2019-12-09 RX ADMIN — Medication 13 UNIT(S): at 09:47

## 2019-12-09 RX ADMIN — Medication 5 MILLIGRAM(S): at 22:22

## 2019-12-09 RX ADMIN — CLOPIDOGREL BISULFATE 75 MILLIGRAM(S): 75 TABLET, FILM COATED ORAL at 11:11

## 2019-12-09 NOTE — CHART NOTE - NSCHARTNOTEFT_GEN_A_CORE
59 year-old male with history of COPD on 2L NC @ home, pulmonary HTN, CAD and stents, IDDM2, paranoid schizophrenia, anxietyp/w worsening SOB and cough for 1 week from long term NH a/w acute COPD exacerbation due to RSV respiratory infection.     Problem/Plan - 1:  ·  Problem: COPD exacerbation.  Plan: - improved. PO prednisone with slow taper.    -> Pt remains medically cleared and optimized for safe discharge. Case management for arranging safe discharge   All team members management and pt care appreciated.      Problem/Plan - 2:  ·  Problem: Type 2 diabetes mellitus with hyperglycemia, with long-term current use of insulin.  Plan: dm rx  dm education, lifestyle modifications.      Problem/Plan - 3:  ·  Problem: Viral upper respiratory tract infection with cough.  Plan: improving overall  c/w supportive care.      Problem/Plan - 4:  ·  Problem: Stented coronary artery.  Plan: ASA, Plavix, statin.      Problem/Plan - 5:  ·  Problem: Paranoid schizophrenia.  Plan: Risperidone tid.      Problem/Plan - 6:  Problem: Gastroesophageal reflux disease, esophagitis presence not specified. Plan: Pepcid.     Problem/Plan - 7:  ·  Problem: Smoker.  Plan: smoking cessation education provided   nicotine patch.     Problem/Plan - 8:  ·  Problem: Prophylactic measure.  Plan: - Lovenox sc inpatient.      Problem/Plan - 9:  ·  Problem: Panic attack.  Plan: xanax prn.      Problem/Plan - 10:  Problem: Chronic pain. Plan; analgesics prn.    Dispo:  Plan for discharge back to Nursing Home      FAY [place]-ACNP

## 2019-12-09 NOTE — PROGRESS NOTE ADULT - PROBLEM SELECTOR PLAN 1
- improved. PO prednisone with slow taper.    -> Pt remains medically cleared and optimized for safe discharge. Case management for arranging safe discharge   All team members management and pt care appreciated

## 2019-12-09 NOTE — PROGRESS NOTE ADULT - SUBJECTIVE AND OBJECTIVE BOX
Chief complaint  Patient is a 59y old  Male who presents with a chief complaint of SOB (09 Dec 2019 15:43)   Review of systems  Patient in bed, looks comfortable, no fever, no hypoglycemia.    Labs and Fingersticks  CAPILLARY BLOOD GLUCOSE      POCT Blood Glucose.: 295 mg/dL (09 Dec 2019 11:56)  POCT Blood Glucose.: 148 mg/dL (09 Dec 2019 09:26)  POCT Blood Glucose.: 309 mg/dL (08 Dec 2019 21:14)  POCT Blood Glucose.: 256 mg/dL (08 Dec 2019 17:46)                        Medications  MEDICATIONS  (STANDING):  albuterol/ipratropium for Nebulization 3 milliLiter(s) Nebulizer every 6 hours  aspirin enteric coated 81 milliGRAM(s) Oral daily  atorvastatin 40 milliGRAM(s) Oral at bedtime  budesonide 160 MICROgram(s)/formoterol 4.5 MICROgram(s) Inhaler 2 Puff(s) Inhalation two times a day  clopidogrel Tablet 75 milliGRAM(s) Oral daily  dextrose 5%. 1000 milliLiter(s) (50 mL/Hr) IV Continuous <Continuous>  dextrose 50% Injectable 12.5 Gram(s) IV Push once  dextrose 50% Injectable 25 Gram(s) IV Push once  dextrose 50% Injectable 25 Gram(s) IV Push once  enoxaparin Injectable 40 milliGRAM(s) SubCutaneous daily  famotidine    Tablet 20 milliGRAM(s) Oral daily  insulin glargine Injectable (LANTUS) 73 Unit(s) SubCutaneous every morning  insulin glargine Injectable (LANTUS) 75 Unit(s) SubCutaneous at bedtime  insulin lispro (HumaLOG) corrective regimen sliding scale   SubCutaneous three times a day before meals  insulin lispro (HumaLOG) corrective regimen sliding scale   SubCutaneous at bedtime  insulin lispro Injectable (HumaLOG) 13 Unit(s) SubCutaneous three times a day with meals  melatonin 5 milliGRAM(s) Oral at bedtime  risperiDONE   Tablet 0.5 milliGRAM(s) Oral three times a day      Physical Exam  General: Patient comfortable in bed  Vital Signs Last 12 Hrs  T(F): 98.6 (12-09-19 @ 13:01), Max: 98.6 (12-09-19 @ 13:01)  HR: 100 (12-09-19 @ 13:01) (88 - 101)  BP: 133/81 (12-09-19 @ 13:01) (133/81 - 133/81)  BP(mean): --  RR: 17 (12-09-19 @ 13:01) (17 - 17)  SpO2: 98% (12-09-19 @ 13:01) (98% - 99%)  Neck: No palpable thyroid nodules.  CVS: S1S2, No murmurs  Respiratory: No wheezing, no crepitations  GI: Abdomen soft, bowel sounds positive  Musculoskeletal:  edema lower extremities.   Skin: No skin rashes, no ecchymosis    Diagnostics Chief complaint  Patient is a 59y old  Male who presents with a chief complaint of SOB (09 Dec 2019 15:43)   Review of systems  Patient in bed, looks comfortable, no fever,  no hypoglycemia.    Labs and Fingersticks  CAPILLARY BLOOD GLUCOSE      POCT Blood Glucose.: 295 mg/dL (09 Dec 2019 11:56)  POCT Blood Glucose.: 148 mg/dL (09 Dec 2019 09:26)  POCT Blood Glucose.: 309 mg/dL (08 Dec 2019 21:14)  POCT Blood Glucose.: 256 mg/dL (08 Dec 2019 17:46)      Medications  MEDICATIONS  (STANDING):  albuterol/ipratropium for Nebulization 3 milliLiter(s) Nebulizer every 6 hours  aspirin enteric coated 81 milliGRAM(s) Oral daily  atorvastatin 40 milliGRAM(s) Oral at bedtime  budesonide 160 MICROgram(s)/formoterol 4.5 MICROgram(s) Inhaler 2 Puff(s) Inhalation two times a day  clopidogrel Tablet 75 milliGRAM(s) Oral daily  dextrose 5%. 1000 milliLiter(s) (50 mL/Hr) IV Continuous <Continuous>  dextrose 50% Injectable 12.5 Gram(s) IV Push once  dextrose 50% Injectable 25 Gram(s) IV Push once  dextrose 50% Injectable 25 Gram(s) IV Push once  enoxaparin Injectable 40 milliGRAM(s) SubCutaneous daily  famotidine    Tablet 20 milliGRAM(s) Oral daily  insulin glargine Injectable (LANTUS) 73 Unit(s) SubCutaneous every morning  insulin glargine Injectable (LANTUS) 75 Unit(s) SubCutaneous at bedtime  insulin lispro (HumaLOG) corrective regimen sliding scale   SubCutaneous three times a day before meals  insulin lispro (HumaLOG) corrective regimen sliding scale   SubCutaneous at bedtime  insulin lispro Injectable (HumaLOG) 13 Unit(s) SubCutaneous three times a day with meals  melatonin 5 milliGRAM(s) Oral at bedtime  risperiDONE   Tablet 0.5 milliGRAM(s) Oral three times a day      Physical Exam  General: Patient comfortable in bed  Vital Signs Last 12 Hrs  T(F): 98.6 (12-09-19 @ 13:01), Max: 98.6 (12-09-19 @ 13:01)  HR: 100 (12-09-19 @ 13:01) (88 - 101)  BP: 133/81 (12-09-19 @ 13:01) (133/81 - 133/81)  BP(mean): --  RR: 17 (12-09-19 @ 13:01) (17 - 17)  SpO2: 98% (12-09-19 @ 13:01) (98% - 99%)  Neck: No palpable thyroid nodules.  CVS: S1S2, No murmurs  Respiratory: No wheezing, no crepitations  GI: Abdomen soft, bowel sounds positive  Musculoskeletal:  edema lower extremities.   Skin: No skin rashes, no ecchymosis    Diagnostics

## 2019-12-09 NOTE — PROGRESS NOTE ADULT - ASSESSMENT
59 year-old male with history of COPD on 2L NC @ home, pulmonary HTN, CAD and stents, IDDM2, paranoid schizophrenia, anxiety p/w worsening SOB and cough for 1 week from long term NH.  He has been there for the past 8 months and states that he was not getting his nebs ATC and sometimes even PRN, thinks that exacerbated his SOB/COPD. Nephrology consulted for electrolyte abnormality.    Hyponatremia  - in the setting of elevated glucose  - elevated serum osm likely from high glucose    - repeat BMP today is pending    - optimized DM control     Hyperkalemia   - in the setting of elevated glucose  s/p lokelma     - optimized DM control     HTN  BP acceptable

## 2019-12-09 NOTE — PROGRESS NOTE ADULT - PROBLEM SELECTOR PLAN 1
Still feels sob: BUT NOT MUCH OF WHEEZING: He is an active smoker: Cont steroids as wellas BD and oxygen: HIS VENOUS ABG IS REVIEWED: HE LIKELY HAS CHR RETENTION AND MILD AC ON CHR HYPERCARBIC RESP FAILURE: WILL REPEAT HIS abg IN am: HE SEEMS COMFORTABLE: I DON'T THINK HE NEEDS BIPAP at this time  12/5: doing much better: change to po steroids and decrease the dose: no wheezing  12/6: Mild ac on chr hypercarbic resp failure: secondary to copd exacerbation: doing pretty well: alert and awake: no need for bipap  12/7 stable. continue current management  12/8 stable. diminished breath sounds no wheezing. wean off oxygen. goal of O2 sat greater than 88%  12/9  ; remains stable: off steorids: no wheezing

## 2019-12-09 NOTE — PROGRESS NOTE ADULT - SUBJECTIVE AND OBJECTIVE BOX
[facility] [place] [facility]   MD Chelsea Burroughs D.O. Fatima Sheikh, D.O. Ruoru Wong, PA    From 7 AM - 5 PM:  OFFICE: [phone]  Dr. Juarez cell: [phone]  Dr. Daniel cell: [phone]  Dr. Kwong cell: [phone]  ALVARO Spencer cell: [phone]    From 5 PM - 7 AM: Answering Service: [phone]        RENAL FOLLOW UP NOTE  --------------------------------------------------------------------------------  HPI: Pt seen and examined. Has no complaints today. Denies any urinary symptoms, SOB, N/V, cough. Wants to go home today.        PAST HISTORY  --------------------------------------------------------------------------------  No significant changes to PMH, PSH, FHx, SHx, unless otherwise noted    ALLERGIES & MEDICATIONS  --------------------------------------------------------------------------------  Allergies    No Known Allergies    Intolerances      Standing Inpatient Medications  albuterol/ipratropium for Nebulization 3 milliLiter(s) Nebulizer every 6 hours  aspirin enteric coated 81 milliGRAM(s) Oral daily  atorvastatin 40 milliGRAM(s) Oral at bedtime  budesonide 160 MICROgram(s)/formoterol 4.5 MICROgram(s) Inhaler 2 Puff(s) Inhalation two times a day  clopidogrel Tablet 75 milliGRAM(s) Oral daily  dextrose 5%. 1000 milliLiter(s) IV Continuous <Continuous>  dextrose 50% Injectable 12.5 Gram(s) IV Push once  dextrose 50% Injectable 25 Gram(s) IV Push once  dextrose 50% Injectable 25 Gram(s) IV Push once  enoxaparin Injectable 40 milliGRAM(s) SubCutaneous daily  famotidine    Tablet 20 milliGRAM(s) Oral daily  insulin glargine Injectable (LANTUS) 73 Unit(s) SubCutaneous every morning  insulin glargine Injectable (LANTUS) 75 Unit(s) SubCutaneous at bedtime  insulin lispro (HumaLOG) corrective regimen sliding scale   SubCutaneous three times a day before meals  insulin lispro (HumaLOG) corrective regimen sliding scale   SubCutaneous at bedtime  insulin lispro Injectable (HumaLOG) 13 Unit(s) SubCutaneous three times a day with meals  melatonin 5 milliGRAM(s) Oral at bedtime  risperiDONE   Tablet 0.5 milliGRAM(s) Oral three times a day    PRN Inpatient Medications  ALPRAZolam 1 milliGRAM(s) Oral every 8 hours PRN  dextrose 40% Gel 15 Gram(s) Oral once PRN  glucagon  Injectable 1 milliGRAM(s) IntraMuscular once PRN  ibuprofen  Tablet. 800 milliGRAM(s) Oral three times a day PRN  lidocaine   Patch 1 Patch Transdermal daily PRN  oxycodone    5 mG/acetaminophen 325 mG 2 Tablet(s) Oral every 6 hours PRN      REVIEW OF SYSTEMS  --------------------------------------------------------------------------------  General: no fever  CVS: no chest pain  RESP: no sob, no cough  ABD: no abdominal pain  : no dysuria,  MSK: no edema     VITALS/PHYSICAL EXAM  --------------------------------------------------------------------------------  T(C): 37 (12-09-19 @ 13:01), Max: 37 (12-09-19 @ 13:01)  HR: 100 (12-09-19 @ 13:01) (78 - 101)  BP: 133/81 (12-09-19 @ 13:01) (123/80 - 133/81)  RR: 17 (12-09-19 @ 13:01) (17 - 20)  SpO2: 98% (12-09-19 @ 13:01) (94% - 99%)  Wt(kg): --        12-08-19 @ 07:01  -  12-09-19 @ 07:00  --------------------------------------------------------  IN: 80 mL / OUT: 0 mL / NET: 80 mL      Physical Exam:  	Gen: NAD  	HEENT: MMM  	Pulm: CTA B/L  	CV: S1S2  	Abd: Soft, +BS  	Ext: 2+ LE edema B/L              Neuro: Awake   	Skin: Warm and Dry       LABS/STUDIES  --------------------------------------------------------------------------------                Creatinine Trend:  SCr 0.62 [12-04 @ 10:58]  SCr 0.65 [12-03 @ 12:37]  SCr 0.63 [12-03 @ 09:37]  SCr 0.73 [12-03 @ 02:00]  SCr 0.86 [12-02 @ 23:50]      Urine Sodium <35      [12-04-19 @ 22:03]  Urine Osmolality 489      [12-04-19 @ 22:24]    HbA1c 10.4      [12-04-19 @ 08:53]

## 2019-12-09 NOTE — PROGRESS NOTE ADULT - ASSESSMENT
Assessment  DMT2: 59y Male with DM T2 with hyperglycemia, A1C 10.4%, on large dose insulin, blood sugars fluctuating, no hypoglycemic episode, patient is now off steroid management. Patient was seen earlier today, comfortable, eating full meals.  COPD Exacerbation: on medications and nebulizer treatment, stable, monitored.  HTN: Controlled,  on antihypertensive medications.  HLD: Controlled, on statin.  Schizophrenia: On meds.  Obesity: No strict exercise routines, not on any weight loss program, neither on low calorie diet.          Laury Romero MD  Cell: [phone]  Office: [phone] Assessment  DMT2: 59y Male with DM T2 with hyperglycemia, A1C 10.4%, on large dose insulin, blood sugars fluctuating, no hypoglycemic episode, patient is now off steroid management.  Patient was seen earlier today, comfortable, eating full meals.  COPD Exacerbation: on medications and nebulizer treatment, stable, monitored.  HTN: Controlled,  on antihypertensive medications.  HLD: Controlled, on statin.  Schizophrenia: On meds.  Obesity: No strict exercise routines, not on any weight loss program, neither on low calorie diet.          Laury Romero MD  Cell: [phone]  Office: [phone]

## 2019-12-09 NOTE — PROGRESS NOTE ADULT - PROBLEM SELECTOR PLAN 2
secondary to steroids: cotn insulin  12/5: cont to control per endo  12/*6: monitor and control per endo  12/8 Endo note appreciated   12/7 FS with sliding scale. Endo is following  12/9: per endo

## 2019-12-09 NOTE — PROGRESS NOTE ADULT - SUBJECTIVE AND OBJECTIVE BOX
Patient is a 59y old  Male who presents with a chief complaint of SOB (09 Dec 2019 11:49)      Any change in ROS: doingok :no sob no wheezing    MEDICATIONS  (STANDING):  albuterol/ipratropium for Nebulization 3 milliLiter(s) Nebulizer every 6 hours  aspirin enteric coated 81 milliGRAM(s) Oral daily  atorvastatin 40 milliGRAM(s) Oral at bedtime  budesonide 160 MICROgram(s)/formoterol 4.5 MICROgram(s) Inhaler 2 Puff(s) Inhalation two times a day  chlorhexidine 4% Liquid 1 Application(s) Topical <User Schedule>  clopidogrel Tablet 75 milliGRAM(s) Oral daily  dextrose 5%. 1000 milliLiter(s) (50 mL/Hr) IV Continuous <Continuous>  dextrose 50% Injectable 12.5 Gram(s) IV Push once  dextrose 50% Injectable 25 Gram(s) IV Push once  dextrose 50% Injectable 25 Gram(s) IV Push once  enoxaparin Injectable 40 milliGRAM(s) SubCutaneous daily  famotidine    Tablet 20 milliGRAM(s) Oral daily  insulin glargine Injectable (LANTUS) 73 Unit(s) SubCutaneous every morning  insulin glargine Injectable (LANTUS) 75 Unit(s) SubCutaneous at bedtime  insulin lispro (HumaLOG) corrective regimen sliding scale   SubCutaneous three times a day before meals  insulin lispro (HumaLOG) corrective regimen sliding scale   SubCutaneous at bedtime  insulin lispro Injectable (HumaLOG) 13 Unit(s) SubCutaneous three times a day with meals  melatonin 5 milliGRAM(s) Oral at bedtime  risperiDONE   Tablet 0.5 milliGRAM(s) Oral three times a day    MEDICATIONS  (PRN):  ALPRAZolam 1 milliGRAM(s) Oral every 8 hours PRN panic attack  dextrose 40% Gel 15 Gram(s) Oral once PRN Blood Glucose LESS THAN 70 milliGRAM(s)/deciliter  glucagon  Injectable 1 milliGRAM(s) IntraMuscular once PRN Glucose LESS THAN 70 milligrams/deciliter  ibuprofen  Tablet. 800 milliGRAM(s) Oral three times a day PRN Moderate Pain (4 - 6)  lidocaine   Patch 1 Patch Transdermal daily PRN back pain  oxycodone    5 mG/acetaminophen 325 mG 2 Tablet(s) Oral every 6 hours PRN Severe Pain (7 - 10)    Vital Signs Last 24 Hrs  T(C): 37 (09 Dec 2019 13:01), Max: 37 (09 Dec 2019 13:01)  T(F): 98.6 (09 Dec 2019 13:01), Max: 98.6 (09 Dec 2019 13:01)  HR: 100 (09 Dec 2019 13:01) (78 - 101)  BP: 133/81 (09 Dec 2019 13:01) (123/80 - 133/81)  BP(mean): --  RR: 17 (09 Dec 2019 13:01) (17 - 20)  SpO2: 98% (09 Dec 2019 13:01) (94% - 99%)    I&O's Summary    08 Dec 2019 07:01  -  09 Dec 2019 07:00  --------------------------------------------------------  IN: 80 mL / OUT: 0 mL / NET: 80 mL          Physical Exam:   GENERAL: NAD, well-groomed, well-developed  HEENT: GALO/   Atraumatic, Normocephalic  ENMT: No tonsillar erythema, exudates, or enlargement; Moist mucous membranes, Good dentition, No lesions  NECK: Supple, No JVD, Normal thyroid  CHEST/LUNG: Clear to auscultaion, ; No rales, rhonchi, wheezing, or rubs  CVS: Regular rate and rhythm; No murmurs, rubs, or gallops  GI: : Soft, Nontender, Nondistended; Bowel sounds present  NERVOUS SYSTEM:  Alert & Oriented X3  EXTREMITIES:  2+ Peripheral Pulses, No clubbing, cyanosis, or edema  LYMPH: No lymphadenopathy noted  SKIN: No rashes or lesions  ENDOCRINOLOGY: No Thyromegaly  PSYCH: Appropriate    Labs:  33              CAPILLARY BLOOD GLUCOSE      POCT Blood Glucose.: 295 mg/dL (09 Dec 2019 11:56)  POCT Blood Glucose.: 148 mg/dL (09 Dec 2019 09:26)  POCT Blood Glucose.: 309 mg/dL (08 Dec 2019 21:14)  POCT Blood Glucose.: 256 mg/dL (08 Dec 2019 17:46)                  RECENT CULTURES:        RESPIRATORY CULTURES:          Studies  Chest X-RAY  CT SCAN Chest   Venous Dopplers: LE:   CT Abdomen  Others

## 2019-12-09 NOTE — PROGRESS NOTE ADULT - SUBJECTIVE AND OBJECTIVE BOX
Patient seen and examined at bedside  No acute events noted overnight  Case discussed with medical team    HPI:  59 year-old male with history of COPD on 2L NC @ home, pulmonary HTN, CAD and stents, IDDM2, paranoid schizophrenia, anxietyp/w worsening SOB and cough for 1 week from long term NH.  He has been there for the past 8 months and states that he was not getting his nebs ATC and sometimes even PRN, thinks that exacerbated his SOB/COPD.  Active tobacco smoker, 2 cigs/day currently.  Denies fevers, chills, nausea, vomiting, abdominal pain, LE edema or sick contact. Wants Benzo to calm him down and to sleep. (03 Dec 2019 04:54)      PAST MEDICAL & SURGICAL HISTORY:  Oxygen dependent  Gastroesophageal reflux disease, esophagitis presence not specified  Smoker  Bipolar 1 disorder  Coronary artery disease involving native coronary artery of native heart without angina pectoris  Type 2 diabetes mellitus without complication, with long-term current use of insulin  Pulmonary HTN  HLD (hyperlipidemia)  Stented coronary artery  COPD (chronic obstructive pulmonary disease)  No significant past surgical history      No Known Allergies       MEDICATIONS  (STANDING):  albuterol/ipratropium for Nebulization 3 milliLiter(s) Nebulizer every 6 hours  aspirin enteric coated 81 milliGRAM(s) Oral daily  atorvastatin 40 milliGRAM(s) Oral at bedtime  budesonide 160 MICROgram(s)/formoterol 4.5 MICROgram(s) Inhaler 2 Puff(s) Inhalation two times a day  chlorhexidine 4% Liquid 1 Application(s) Topical <User Schedule>  clopidogrel Tablet 75 milliGRAM(s) Oral daily  dextrose 5%. 1000 milliLiter(s) (50 mL/Hr) IV Continuous <Continuous>  dextrose 50% Injectable 12.5 Gram(s) IV Push once  dextrose 50% Injectable 25 Gram(s) IV Push once  dextrose 50% Injectable 25 Gram(s) IV Push once  enoxaparin Injectable 40 milliGRAM(s) SubCutaneous daily  famotidine    Tablet 20 milliGRAM(s) Oral daily  insulin glargine Injectable (LANTUS) 73 Unit(s) SubCutaneous every morning  insulin glargine Injectable (LANTUS) 75 Unit(s) SubCutaneous at bedtime  insulin lispro (HumaLOG) corrective regimen sliding scale   SubCutaneous three times a day before meals  insulin lispro (HumaLOG) corrective regimen sliding scale   SubCutaneous at bedtime  insulin lispro Injectable (HumaLOG) 13 Unit(s) SubCutaneous three times a day with meals  melatonin 5 milliGRAM(s) Oral at bedtime  risperiDONE   Tablet 0.5 milliGRAM(s) Oral three times a day    MEDICATIONS  (PRN):  ALPRAZolam 1 milliGRAM(s) Oral two times a day PRN panic attack  dextrose 40% Gel 15 Gram(s) Oral once PRN Blood Glucose LESS THAN 70 milliGRAM(s)/deciliter  glucagon  Injectable 1 milliGRAM(s) IntraMuscular once PRN Glucose LESS THAN 70 milligrams/deciliter  ibuprofen  Tablet. 800 milliGRAM(s) Oral three times a day PRN Moderate Pain (4 - 6)  lidocaine   Patch 1 Patch Transdermal daily PRN back pain  oxycodone    5 mG/acetaminophen 325 mG 2 Tablet(s) Oral every 6 hours PRN Severe Pain (7 - 10)      REVIEW OF SYSTEMS:  CONSTITUTIONAL: (+) malaise.   EYES: No acute change in vision   ENT:  No tinnitus  NECK: No stiffness  RESPIRATORY: No hemoptysis  CARDIOVASCULAR: No chest pain, palpitations, syncope  GASTROINTESTINAL: No hematemesis, diarrhea, melena, or hematochezia.  GENITOURINARY: No hematuria  NEUROLOGICAL: No headaches  LYMPH Nodes: No enlarged glands  ENDOCRINE: No heat or cold intolerance	    T(C): 36.3 (12-09-19 @ 04:33), Max: 36.5 (12-08-19 @ 14:01)  HR: 88 (12-09-19 @ 06:02) (78 - 96)  BP: 123/80 (12-09-19 @ 04:33) (123/80 - 127/82)  RR: 20 (12-09-19 @ 04:33) (20 - 20)  SpO2: 99% (12-09-19 @ 06:02) (93% - 99%)    PHYSICAL EXAMINATION:   Constitutional: WD, NAD  HEENT: NC, AT  Neck:  Supple  Respiratory:  Adequate airflow b/l. Not using accessory muscles of respiration.  Cardiovascular:  S1 & S2 intact, no R/G, 2+ radial pulses b/l  Gastrointestinal: Soft, NT, ND, normoactive b.s., no organomegaly/RT/rigidity  Extremities: WWP  Neurological:  Alert and awake.  No acute focal motor deficits. Crude sensation intact.     Labs and imaging reviewed    LABS:                  CAPILLARY BLOOD GLUCOSE      POCT Blood Glucose.: 148 mg/dL (09 Dec 2019 09:26)  POCT Blood Glucose.: 309 mg/dL (08 Dec 2019 21:14)  POCT Blood Glucose.: 256 mg/dL (08 Dec 2019 17:46)  POCT Blood Glucose.: 253 mg/dL (08 Dec 2019 12:11)              RADIOLOGY & ADDITIONAL STUDIES:

## 2019-12-09 NOTE — PROGRESS NOTE ADULT - PROBLEM SELECTOR PLAN 1
Steroid has been d/c; Expect blood sugars to start trending down.  Will continue current insulin regimen for now. Will continue monitoring FS, log, and FU.  Patient counseled for compliance with consistent low carb diet and exercise as tolerated outpatient. Will continue current insulin regimen for now. Will continue monitoring FS, log, and FU.  Patient counseled for compliance with consistent low carb diet and exercise as tolerated outpatient.

## 2019-12-10 ENCOUNTER — TRANSCRIPTION ENCOUNTER (OUTPATIENT)
Age: 59
End: 2019-12-10

## 2019-12-10 LAB
GLUCOSE BLDC GLUCOMTR-MCNC: 123 MG/DL — HIGH (ref 70–99)
GLUCOSE BLDC GLUCOMTR-MCNC: 145 MG/DL — HIGH (ref 70–99)
GLUCOSE BLDC GLUCOMTR-MCNC: 166 MG/DL — HIGH (ref 70–99)
GLUCOSE BLDC GLUCOMTR-MCNC: 175 MG/DL — HIGH (ref 70–99)
GLUCOSE BLDC GLUCOMTR-MCNC: 191 MG/DL — HIGH (ref 70–99)

## 2019-12-10 RX ORDER — SENNA PLUS 8.6 MG/1
2 TABLET ORAL AT BEDTIME
Refills: 0 | Status: DISCONTINUED | OUTPATIENT
Start: 2019-12-10 | End: 2019-12-12

## 2019-12-10 RX ORDER — INSULIN GLARGINE 100 [IU]/ML
50 INJECTION, SOLUTION SUBCUTANEOUS ONCE
Refills: 0 | Status: COMPLETED | OUTPATIENT
Start: 2019-12-10 | End: 2019-12-10

## 2019-12-10 RX ORDER — POLYETHYLENE GLYCOL 3350 17 G/17G
17 POWDER, FOR SOLUTION ORAL DAILY
Refills: 0 | Status: DISCONTINUED | OUTPATIENT
Start: 2019-12-10 | End: 2019-12-12

## 2019-12-10 RX ADMIN — Medication 1 MILLIGRAM(S): at 20:30

## 2019-12-10 RX ADMIN — INSULIN GLARGINE 73 UNIT(S): 100 INJECTION, SOLUTION SUBCUTANEOUS at 10:35

## 2019-12-10 RX ADMIN — Medication 1: at 09:10

## 2019-12-10 RX ADMIN — ATORVASTATIN CALCIUM 40 MILLIGRAM(S): 80 TABLET, FILM COATED ORAL at 21:45

## 2019-12-10 RX ADMIN — Medication 1 MILLIGRAM(S): at 10:44

## 2019-12-10 RX ADMIN — Medication 13 UNIT(S): at 13:26

## 2019-12-10 RX ADMIN — RISPERIDONE 0.5 MILLIGRAM(S): 4 TABLET ORAL at 21:45

## 2019-12-10 RX ADMIN — Medication 3 MILLILITER(S): at 05:49

## 2019-12-10 RX ADMIN — OXYCODONE AND ACETAMINOPHEN 2 TABLET(S): 5; 325 TABLET ORAL at 00:26

## 2019-12-10 RX ADMIN — BUDESONIDE AND FORMOTEROL FUMARATE DIHYDRATE 2 PUFF(S): 160; 4.5 AEROSOL RESPIRATORY (INHALATION) at 05:48

## 2019-12-10 RX ADMIN — Medication 13 UNIT(S): at 09:10

## 2019-12-10 RX ADMIN — RISPERIDONE 0.5 MILLIGRAM(S): 4 TABLET ORAL at 13:25

## 2019-12-10 RX ADMIN — Medication 5 MILLIGRAM(S): at 21:44

## 2019-12-10 RX ADMIN — Medication 81 MILLIGRAM(S): at 11:45

## 2019-12-10 RX ADMIN — POLYETHYLENE GLYCOL 3350 17 GRAM(S): 17 POWDER, FOR SOLUTION ORAL at 20:31

## 2019-12-10 RX ADMIN — Medication 3 MILLILITER(S): at 11:44

## 2019-12-10 RX ADMIN — Medication 0: at 17:26

## 2019-12-10 RX ADMIN — Medication 13 UNIT(S): at 17:27

## 2019-12-10 RX ADMIN — RISPERIDONE 0.5 MILLIGRAM(S): 4 TABLET ORAL at 05:47

## 2019-12-10 RX ADMIN — Medication 1: at 13:25

## 2019-12-10 RX ADMIN — CLOPIDOGREL BISULFATE 75 MILLIGRAM(S): 75 TABLET, FILM COATED ORAL at 11:45

## 2019-12-10 RX ADMIN — Medication 800 MILLIGRAM(S): at 09:11

## 2019-12-10 RX ADMIN — BUDESONIDE AND FORMOTEROL FUMARATE DIHYDRATE 2 PUFF(S): 160; 4.5 AEROSOL RESPIRATORY (INHALATION) at 17:25

## 2019-12-10 RX ADMIN — OXYCODONE AND ACETAMINOPHEN 2 TABLET(S): 5; 325 TABLET ORAL at 18:51

## 2019-12-10 RX ADMIN — INSULIN GLARGINE 50 UNIT(S): 100 INJECTION, SOLUTION SUBCUTANEOUS at 21:50

## 2019-12-10 RX ADMIN — Medication 800 MILLIGRAM(S): at 10:11

## 2019-12-10 RX ADMIN — OXYCODONE AND ACETAMINOPHEN 2 TABLET(S): 5; 325 TABLET ORAL at 19:30

## 2019-12-10 RX ADMIN — ENOXAPARIN SODIUM 40 MILLIGRAM(S): 100 INJECTION SUBCUTANEOUS at 11:45

## 2019-12-10 RX ADMIN — FAMOTIDINE 20 MILLIGRAM(S): 10 INJECTION INTRAVENOUS at 11:45

## 2019-12-10 RX ADMIN — SENNA PLUS 2 TABLET(S): 8.6 TABLET ORAL at 21:47

## 2019-12-10 RX ADMIN — OXYCODONE AND ACETAMINOPHEN 2 TABLET(S): 5; 325 TABLET ORAL at 05:46

## 2019-12-10 RX ADMIN — OXYCODONE AND ACETAMINOPHEN 2 TABLET(S): 5; 325 TABLET ORAL at 11:48

## 2019-12-10 RX ADMIN — Medication 3 MILLILITER(S): at 17:25

## 2019-12-10 RX ADMIN — OXYCODONE AND ACETAMINOPHEN 2 TABLET(S): 5; 325 TABLET ORAL at 12:48

## 2019-12-10 RX ADMIN — OXYCODONE AND ACETAMINOPHEN 2 TABLET(S): 5; 325 TABLET ORAL at 06:51

## 2019-12-10 NOTE — DISCHARGE NOTE NURSING/CASE MANAGEMENT/SOCIAL WORK - PATIENT PORTAL LINK FT
You can access the FollowMyHealth Patient Portal offered by [facility] by registering at the following website: [email or web address]. By joining [facility]’s FollowMyHealth portal, you will also be able to view your health information using other applications (apps) compatible with our system.

## 2019-12-10 NOTE — PROGRESS NOTE ADULT - ASSESSMENT
Assessment  DMT2: 59y Male with DM T2 with hyperglycemia, A1C 10.4%, on large dose insulin, blood sugars fluctuating though overall improving now off steroid management, no hypoglycemic episode. Patient is eating full meals, appears comfortable, planning DC to rehab.  COPD Exacerbation: on medications and nebulizer treatment, stable, monitored.  HTN: Controlled,  on antihypertensive medications.  HLD: Controlled, on statin.  Schizophrenia: On meds.  Obesity: No strict exercise routines, not on any weight loss program, neither on low calorie diet.          Laury Romero MD  Cell: [phone]  Office: [phone] Assessment  DMT2: 59y Male with DM T2 with hyperglycemia, A1C 10.4%, on large dose insulin, blood sugars fluctuating though overall improving now off steroid management, no hypoglycemic episode. Patient is eating full meals, appears comfortable,  planning DC to rehab.  COPD Exacerbation: on medications and nebulizer treatment, stable, monitored.  HTN: Controlled,  on antihypertensive medications.  HLD: Controlled, on statin.  Schizophrenia: On meds.  Obesity: No strict exercise routines, not on any weight loss program, neither on low calorie diet.          Laury Romero MD  Cell: [phone]  Office: [phone]

## 2019-12-10 NOTE — PROGRESS NOTE ADULT - SUBJECTIVE AND OBJECTIVE BOX
Chief complaint  Patient is a 59y old  Male who presents with a chief complaint of SOB (10 Dec 2019 11:59)   Review of systems  Patient sitting up in bed, looks comfortable, no fever, no hypoglycemia.    Labs and Fingersticks  CAPILLARY BLOOD GLUCOSE      POCT Blood Glucose.: 175 mg/dL (10 Dec 2019 12:41)  POCT Blood Glucose.: 191 mg/dL (10 Dec 2019 10:33)  POCT Blood Glucose.: 166 mg/dL (10 Dec 2019 08:38)  POCT Blood Glucose.: 263 mg/dL (09 Dec 2019 21:31)  POCT Blood Glucose.: 320 mg/dL (09 Dec 2019 17:23)                        Medications  MEDICATIONS  (STANDING):  albuterol/ipratropium for Nebulization 3 milliLiter(s) Nebulizer every 6 hours  aspirin enteric coated 81 milliGRAM(s) Oral daily  atorvastatin 40 milliGRAM(s) Oral at bedtime  budesonide 160 MICROgram(s)/formoterol 4.5 MICROgram(s) Inhaler 2 Puff(s) Inhalation two times a day  clopidogrel Tablet 75 milliGRAM(s) Oral daily  dextrose 5%. 1000 milliLiter(s) (50 mL/Hr) IV Continuous <Continuous>  dextrose 50% Injectable 12.5 Gram(s) IV Push once  dextrose 50% Injectable 25 Gram(s) IV Push once  dextrose 50% Injectable 25 Gram(s) IV Push once  enoxaparin Injectable 40 milliGRAM(s) SubCutaneous daily  famotidine    Tablet 20 milliGRAM(s) Oral daily  insulin glargine Injectable (LANTUS) 73 Unit(s) SubCutaneous every morning  insulin glargine Injectable (LANTUS) 75 Unit(s) SubCutaneous at bedtime  insulin lispro (HumaLOG) corrective regimen sliding scale   SubCutaneous three times a day before meals  insulin lispro (HumaLOG) corrective regimen sliding scale   SubCutaneous at bedtime  insulin lispro Injectable (HumaLOG) 13 Unit(s) SubCutaneous three times a day with meals  melatonin 5 milliGRAM(s) Oral at bedtime  risperiDONE   Tablet 0.5 milliGRAM(s) Oral three times a day      Physical Exam  General: Patient comfortable in bed  Vital Signs Last 12 Hrs  T(F): 97.8 (12-10-19 @ 12:57), Max: 97.8 (12-10-19 @ 12:57)  HR: 106 (12-10-19 @ 12:57) (83 - 106)  BP: 128/74 (12-10-19 @ 12:57) (111/69 - 128/74)  BP(mean): --  RR: 18 (12-10-19 @ 12:57) (18 - 19)  SpO2: 90% (12-10-19 @ 12:57) (90% - 92%)  Neck: No palpable thyroid nodules.  CVS: S1S2, No murmurs  Respiratory: No wheezing, no crepitations  GI: Abdomen soft, bowel sounds positive  Musculoskeletal:  edema lower extremities.   Skin: No skin rashes, no ecchymosis    Diagnostics Chief complaint  Patient is a 59y old  Male who presents with a chief complaint of SOB (10 Dec 2019 11:59)   Review of systems  Patient sitting up in bed, looks comfortable, no fever,  no hypoglycemia.    Labs and Fingersticks  CAPILLARY BLOOD GLUCOSE      POCT Blood Glucose.: 175 mg/dL (10 Dec 2019 12:41)  POCT Blood Glucose.: 191 mg/dL (10 Dec 2019 10:33)  POCT Blood Glucose.: 166 mg/dL (10 Dec 2019 08:38)  POCT Blood Glucose.: 263 mg/dL (09 Dec 2019 21:31)  POCT Blood Glucose.: 320 mg/dL (09 Dec 2019 17:23)                        Medications  MEDICATIONS  (STANDING):  albuterol/ipratropium for Nebulization 3 milliLiter(s) Nebulizer every 6 hours  aspirin enteric coated 81 milliGRAM(s) Oral daily  atorvastatin 40 milliGRAM(s) Oral at bedtime  budesonide 160 MICROgram(s)/formoterol 4.5 MICROgram(s) Inhaler 2 Puff(s) Inhalation two times a day  clopidogrel Tablet 75 milliGRAM(s) Oral daily  dextrose 5%. 1000 milliLiter(s) (50 mL/Hr) IV Continuous <Continuous>  dextrose 50% Injectable 12.5 Gram(s) IV Push once  dextrose 50% Injectable 25 Gram(s) IV Push once  dextrose 50% Injectable 25 Gram(s) IV Push once  enoxaparin Injectable 40 milliGRAM(s) SubCutaneous daily  famotidine    Tablet 20 milliGRAM(s) Oral daily  insulin glargine Injectable (LANTUS) 73 Unit(s) SubCutaneous every morning  insulin glargine Injectable (LANTUS) 75 Unit(s) SubCutaneous at bedtime  insulin lispro (HumaLOG) corrective regimen sliding scale   SubCutaneous three times a day before meals  insulin lispro (HumaLOG) corrective regimen sliding scale   SubCutaneous at bedtime  insulin lispro Injectable (HumaLOG) 13 Unit(s) SubCutaneous three times a day with meals  melatonin 5 milliGRAM(s) Oral at bedtime  risperiDONE   Tablet 0.5 milliGRAM(s) Oral three times a day      Physical Exam  General: Patient comfortable in bed  Vital Signs Last 12 Hrs  T(F): 97.8 (12-10-19 @ 12:57), Max: 97.8 (12-10-19 @ 12:57)  HR: 106 (12-10-19 @ 12:57) (83 - 106)  BP: 128/74 (12-10-19 @ 12:57) (111/69 - 128/74)  BP(mean): --  RR: 18 (12-10-19 @ 12:57) (18 - 19)  SpO2: 90% (12-10-19 @ 12:57) (90% - 92%)  Neck: No palpable thyroid nodules.  CVS: S1S2, No murmurs  Respiratory: No wheezing, no crepitations  GI: Abdomen soft, bowel sounds positive  Musculoskeletal:  edema lower extremities.   Skin: No skin rashes, no ecchymosis    Diagnostics

## 2019-12-10 NOTE — DISCHARGE NOTE NURSING/CASE MANAGEMENT/SOCIAL WORK - NSDCFUADDAPPT_GEN_ALL_CORE_FT
Call to schedule follow up appointments with primary care provider Dr. Belle and endocrinologist Dr. Romero,

## 2019-12-10 NOTE — PROGRESS NOTE ADULT - SUBJECTIVE AND OBJECTIVE BOX
Patient seen and examined at bedside  No acute events noted overnight  Case discussed with medical team    HPI:  59 year-old male with history of COPD on 2L NC @ home, pulmonary HTN, CAD and stents, IDDM2, paranoid schizophrenia, anxietyp/w worsening SOB and cough for 1 week from long term NH.  He has been there for the past 8 months and states that he was not getting his nebs ATC and sometimes even PRN, thinks that exacerbated his SOB/COPD.  Active tobacco smoker, 2 cigs/day currently.  Denies fevers, chills, nausea, vomiting, abdominal pain, LE edema or sick contact. Wants Benzo to calm him down and to sleep. (03 Dec 2019 04:54)      PAST MEDICAL & SURGICAL HISTORY:  Oxygen dependent  Gastroesophageal reflux disease, esophagitis presence not specified  Smoker  Bipolar 1 disorder  Coronary artery disease involving native coronary artery of native heart without angina pectoris  Type 2 diabetes mellitus without complication, with long-term current use of insulin  Pulmonary HTN  HLD (hyperlipidemia)  Stented coronary artery  COPD (chronic obstructive pulmonary disease)  No significant past surgical history      No Known Allergies       MEDICATIONS  (STANDING):  albuterol/ipratropium for Nebulization 3 milliLiter(s) Nebulizer every 6 hours  aspirin enteric coated 81 milliGRAM(s) Oral daily  atorvastatin 40 milliGRAM(s) Oral at bedtime  budesonide 160 MICROgram(s)/formoterol 4.5 MICROgram(s) Inhaler 2 Puff(s) Inhalation two times a day  clopidogrel Tablet 75 milliGRAM(s) Oral daily  dextrose 5%. 1000 milliLiter(s) (50 mL/Hr) IV Continuous <Continuous>  dextrose 50% Injectable 12.5 Gram(s) IV Push once  dextrose 50% Injectable 25 Gram(s) IV Push once  dextrose 50% Injectable 25 Gram(s) IV Push once  enoxaparin Injectable 40 milliGRAM(s) SubCutaneous daily  famotidine    Tablet 20 milliGRAM(s) Oral daily  insulin glargine Injectable (LANTUS) 73 Unit(s) SubCutaneous every morning  insulin glargine Injectable (LANTUS) 75 Unit(s) SubCutaneous at bedtime  insulin lispro (HumaLOG) corrective regimen sliding scale   SubCutaneous three times a day before meals  insulin lispro (HumaLOG) corrective regimen sliding scale   SubCutaneous at bedtime  insulin lispro Injectable (HumaLOG) 13 Unit(s) SubCutaneous three times a day with meals  melatonin 5 milliGRAM(s) Oral at bedtime  risperiDONE   Tablet 0.5 milliGRAM(s) Oral three times a day    MEDICATIONS  (PRN):  ALPRAZolam 1 milliGRAM(s) Oral every 8 hours PRN panic attack  dextrose 40% Gel 15 Gram(s) Oral once PRN Blood Glucose LESS THAN 70 milliGRAM(s)/deciliter  glucagon  Injectable 1 milliGRAM(s) IntraMuscular once PRN Glucose LESS THAN 70 milligrams/deciliter  ibuprofen  Tablet. 800 milliGRAM(s) Oral three times a day PRN Moderate Pain (4 - 6)  lidocaine   Patch 1 Patch Transdermal daily PRN back pain  oxycodone    5 mG/acetaminophen 325 mG 2 Tablet(s) Oral every 6 hours PRN Severe Pain (7 - 10)      REVIEW OF SYSTEMS:  CONSTITUTIONAL: (+) malaise.   EYES: No acute change in vision   ENT:  No tinnitus  NECK: No stiffness  RESPIRATORY: No hemoptysis  CARDIOVASCULAR: No chest pain, palpitations, syncope  GASTROINTESTINAL: No hematemesis, diarrhea, melena, or hematochezia.  GENITOURINARY: No hematuria  NEUROLOGICAL: No headaches  LYMPH Nodes: No enlarged glands  ENDOCRINE: No heat or cold intolerance	    T(C): 36.4 (12-10-19 @ 05:14), Max: 37 (12-09-19 @ 13:01)  HR: 83 (12-10-19 @ 05:14) (83 - 105)  BP: 111/69 (12-10-19 @ 05:14) (111/69 - 134/81)  RR: 19 (12-10-19 @ 05:14) (17 - 19)  SpO2: 92% (12-10-19 @ 05:14) (92% - 99%)    PHYSICAL EXAMINATION:   Constitutional: WD, NAD  HEENT: NC, AT  Neck:  Supple  Respiratory:  Adequate airflow b/l. Not using accessory muscles of respiration.  Cardiovascular:  S1 & S2 intact, no R/G, 2+ radial pulses b/l  Gastrointestinal: Soft, NT, ND, normoactive b.s., no organomegaly/RT/rigidity  Extremities: WWP  Neurological:  Alert and awake.  No acute focal motor deficits. Crude sensation intact.     Labs and imaging reviewed    LABS:                  CAPILLARY BLOOD GLUCOSE      POCT Blood Glucose.: 191 mg/dL (10 Dec 2019 10:33)  POCT Blood Glucose.: 166 mg/dL (10 Dec 2019 08:38)  POCT Blood Glucose.: 263 mg/dL (09 Dec 2019 21:31)  POCT Blood Glucose.: 320 mg/dL (09 Dec 2019 17:23)              RADIOLOGY & ADDITIONAL STUDIES:

## 2019-12-10 NOTE — DISCHARGE NOTE NURSING/CASE MANAGEMENT/SOCIAL WORK - NSDCPEWEB_GEN_ALL_CORE
[place] website --- [email or web address] for Tobacco [email or web address] website --- [email or web address]

## 2019-12-10 NOTE — PROGRESS NOTE ADULT - PROBLEM SELECTOR PLAN 3
Cont curretn rx  12/7 continue current meds  12/8 continue home meds  12/9: no chest pain  12/10: stable

## 2019-12-10 NOTE — PROGRESS NOTE ADULT - SUBJECTIVE AND OBJECTIVE BOX
Patient is a 59y old  Male who presents with a chief complaint of SOB (10 Dec 2019 13:10)      Any change in ROS: no SOB     MEDICATIONS  (STANDING):  albuterol/ipratropium for Nebulization 3 milliLiter(s) Nebulizer every 6 hours  aspirin enteric coated 81 milliGRAM(s) Oral daily  atorvastatin 40 milliGRAM(s) Oral at bedtime  budesonide 160 MICROgram(s)/formoterol 4.5 MICROgram(s) Inhaler 2 Puff(s) Inhalation two times a day  clopidogrel Tablet 75 milliGRAM(s) Oral daily  dextrose 5%. 1000 milliLiter(s) (50 mL/Hr) IV Continuous <Continuous>  dextrose 50% Injectable 12.5 Gram(s) IV Push once  dextrose 50% Injectable 25 Gram(s) IV Push once  dextrose 50% Injectable 25 Gram(s) IV Push once  enoxaparin Injectable 40 milliGRAM(s) SubCutaneous daily  famotidine    Tablet 20 milliGRAM(s) Oral daily  insulin glargine Injectable (LANTUS) 73 Unit(s) SubCutaneous every morning  insulin glargine Injectable (LANTUS) 75 Unit(s) SubCutaneous at bedtime  insulin lispro (HumaLOG) corrective regimen sliding scale   SubCutaneous three times a day before meals  insulin lispro (HumaLOG) corrective regimen sliding scale   SubCutaneous at bedtime  insulin lispro Injectable (HumaLOG) 13 Unit(s) SubCutaneous three times a day with meals  melatonin 5 milliGRAM(s) Oral at bedtime  risperiDONE   Tablet 0.5 milliGRAM(s) Oral three times a day    MEDICATIONS  (PRN):  ALPRAZolam 1 milliGRAM(s) Oral every 8 hours PRN panic attack  dextrose 40% Gel 15 Gram(s) Oral once PRN Blood Glucose LESS THAN 70 milliGRAM(s)/deciliter  glucagon  Injectable 1 milliGRAM(s) IntraMuscular once PRN Glucose LESS THAN 70 milligrams/deciliter  ibuprofen  Tablet. 800 milliGRAM(s) Oral three times a day PRN Moderate Pain (4 - 6)  lidocaine   Patch 1 Patch Transdermal daily PRN back pain  oxycodone    5 mG/acetaminophen 325 mG 2 Tablet(s) Oral every 6 hours PRN Severe Pain (7 - 10)    Vital Signs Last 24 Hrs  T(C): 36.6 (10 Dec 2019 12:57), Max: 36.6 (09 Dec 2019 19:39)  T(F): 97.8 (10 Dec 2019 12:57), Max: 97.9 (09 Dec 2019 19:39)  HR: 106 (10 Dec 2019 12:57) (83 - 106)  BP: 128/74 (10 Dec 2019 12:57) (111/69 - 134/81)  BP(mean): --  RR: 18 (10 Dec 2019 12:57) (18 - 19)  SpO2: 90% (10 Dec 2019 12:57) (90% - 94%)    I&O's Summary    10 Dec 2019 07:01  -  10 Dec 2019 13:37  --------------------------------------------------------  IN: 200 mL / OUT: 0 mL / NET: 200 mL          Physical Exam:   GENERAL: NAD, well-groomed, well-developed  HEENT: GALO/   Atraumatic, Normocephalic  ENMT: No tonsillar erythema, exudates, or enlargement; Moist mucous membranes, Good dentition, No lesions  NECK: Supple, No JVD, Normal thyroid  CHEST/LUNG: Clear to auscultaion, ; No rales, rhonchi, wheezing, or rubs  CVS: Regular rate and rhythm; No murmurs, rubs, or gallops  GI: : Soft, Nontender, Nondistended; Bowel sounds present  NERVOUS SYSTEM:  Alert & Oriented X3  EXTREMITIES:  2+ Peripheral Pulses, No clubbing, cyanosis, or edema  LYMPH: No lymphadenopathy noted  SKIN: No rashes or lesions  ENDOCRINOLOGY: No Thyromegaly  PSYCH: Appropriate    Labs:                CAPILLARY BLOOD GLUCOSE      POCT Blood Glucose.: 175 mg/dL (10 Dec 2019 12:41)  POCT Blood Glucose.: 191 mg/dL (10 Dec 2019 10:33)  POCT Blood Glucose.: 166 mg/dL (10 Dec 2019 08:38)  POCT Blood Glucose.: 263 mg/dL (09 Dec 2019 21:31)  POCT Blood Glucose.: 320 mg/dL (09 Dec 2019 17:23)    < from: CT Chest No Cont (12.03.19 @ 12:58) >  COMPARISON: CT chest 10/5/2018    PROCEDURE:   CT of the Chest was performed without intravenous contrast.  Sagittal and coronal reformats were performed.      FINDINGS:    AIRWAYS, LUNGS and PLEURA: Patent central airways. Mild bronchial wall   thickening. No bronchiectasis. Moderate upper lung predominant emphysema.   Mild bandlike atelectasis within the right middle lobe and lingula.   Otherwise, the lungs are clear without consolidation.    No pleural effusion.    MEDIASTINUM AND [place]: No lymphadenopathy.    HEART AND VESSELS: Heart size is normal. No pericardial effusion.   Thoracic aorta and pulmonary artery are normal in diameter. Moderate   calcific coronary atherosclerosis.    VISUALIZED UPPER ABDOMEN: Too small to characterize hypodensity within   the right lobe of the liver (series 2 image 109), unchanged.    CHEST WALL AND BONES: No aggressive osseous lesion. Bilateral   gynecomastia. Old fracture deformity of posterior lower ribs.    LOWER NECK: Within normal limits.    IMPRESSION:     No pneumonia.    Emphysema.                    ANA JONAS M.D., ATTENDING RADIOLOGIST  Thisdocument has been electronically signed. Dec  3 2019  1:32PM              < end of copied text >                RECENT CULTURES:        RESPIRATORY CULTURES:          Studies  Chest X-RAY  CT SCAN Chest   Venous Dopplers: LE:   CT Abdomen  Others

## 2019-12-10 NOTE — PROGRESS NOTE ADULT - PROBLEM SELECTOR PLAN 2
secondary to steroids: cotn insulin  12/5: cont to control per endo  12/*6: monitor and control per endo  12/8 Endo note appreciated   12/7 FS with sliding scale. Endo is following  12/9: per endo  12/10: controlled

## 2019-12-10 NOTE — PROGRESS NOTE ADULT - PROBLEM SELECTOR PLAN 1
Will continue current insulin regimen for now. Will continue monitoring FS, log, and FU.  Patient was on insulin regimen at rehab, A1C 10.4%. Suggest to DC to rehab on current inpatient insulin regimen and FU endo 4 weeks.  Patient counseled for compliance with consistent low carb diet and exercise as tolerated outpatient. Patient was on insulin regimen at rehab, A1C 10.4%. Suggest to DC to rehab on current inpatient insulin regimen and FU endo 4 weeks.  Patient counseled for compliance with consistent low carb diet and exercise as tolerated outpatient.

## 2019-12-11 LAB
GLUCOSE BLDC GLUCOMTR-MCNC: 130 MG/DL — HIGH (ref 70–99)
GLUCOSE BLDC GLUCOMTR-MCNC: 141 MG/DL — HIGH (ref 70–99)
GLUCOSE BLDC GLUCOMTR-MCNC: 72 MG/DL — SIGNIFICANT CHANGE UP (ref 70–99)
GLUCOSE BLDC GLUCOMTR-MCNC: 91 MG/DL — SIGNIFICANT CHANGE UP (ref 70–99)

## 2019-12-11 RX ORDER — INSULIN GLARGINE 100 [IU]/ML
70 INJECTION, SOLUTION SUBCUTANEOUS EVERY MORNING
Refills: 0 | Status: DISCONTINUED | OUTPATIENT
Start: 2019-12-11 | End: 2019-12-12

## 2019-12-11 RX ORDER — INSULIN LISPRO 100/ML
10 VIAL (ML) SUBCUTANEOUS
Refills: 0 | Status: DISCONTINUED | OUTPATIENT
Start: 2019-12-11 | End: 2019-12-12

## 2019-12-11 RX ORDER — INSULIN GLARGINE 100 [IU]/ML
70 INJECTION, SOLUTION SUBCUTANEOUS AT BEDTIME
Refills: 0 | Status: DISCONTINUED | OUTPATIENT
Start: 2019-12-11 | End: 2019-12-12

## 2019-12-11 RX ADMIN — Medication 3 MILLILITER(S): at 14:14

## 2019-12-11 RX ADMIN — BUDESONIDE AND FORMOTEROL FUMARATE DIHYDRATE 2 PUFF(S): 160; 4.5 AEROSOL RESPIRATORY (INHALATION) at 05:17

## 2019-12-11 RX ADMIN — Medication 1 MILLIGRAM(S): at 23:13

## 2019-12-11 RX ADMIN — SENNA PLUS 2 TABLET(S): 8.6 TABLET ORAL at 21:07

## 2019-12-11 RX ADMIN — INSULIN GLARGINE 73 UNIT(S): 100 INJECTION, SOLUTION SUBCUTANEOUS at 08:59

## 2019-12-11 RX ADMIN — Medication 5 MILLIGRAM(S): at 21:07

## 2019-12-11 RX ADMIN — POLYETHYLENE GLYCOL 3350 17 GRAM(S): 17 POWDER, FOR SOLUTION ORAL at 19:21

## 2019-12-11 RX ADMIN — RISPERIDONE 0.5 MILLIGRAM(S): 4 TABLET ORAL at 05:17

## 2019-12-11 RX ADMIN — Medication 81 MILLIGRAM(S): at 14:13

## 2019-12-11 RX ADMIN — Medication 3 MILLILITER(S): at 23:08

## 2019-12-11 RX ADMIN — BUDESONIDE AND FORMOTEROL FUMARATE DIHYDRATE 2 PUFF(S): 160; 4.5 AEROSOL RESPIRATORY (INHALATION) at 18:36

## 2019-12-11 RX ADMIN — Medication 800 MILLIGRAM(S): at 21:06

## 2019-12-11 RX ADMIN — FAMOTIDINE 20 MILLIGRAM(S): 10 INJECTION INTRAVENOUS at 14:14

## 2019-12-11 RX ADMIN — OXYCODONE AND ACETAMINOPHEN 2 TABLET(S): 5; 325 TABLET ORAL at 11:53

## 2019-12-11 RX ADMIN — OXYCODONE AND ACETAMINOPHEN 2 TABLET(S): 5; 325 TABLET ORAL at 01:29

## 2019-12-11 RX ADMIN — INSULIN GLARGINE 70 UNIT(S): 100 INJECTION, SOLUTION SUBCUTANEOUS at 21:22

## 2019-12-11 RX ADMIN — CLOPIDOGREL BISULFATE 75 MILLIGRAM(S): 75 TABLET, FILM COATED ORAL at 14:14

## 2019-12-11 RX ADMIN — OXYCODONE AND ACETAMINOPHEN 2 TABLET(S): 5; 325 TABLET ORAL at 02:00

## 2019-12-11 RX ADMIN — LIDOCAINE 1 PATCH: 4 CREAM TOPICAL at 19:49

## 2019-12-11 RX ADMIN — ATORVASTATIN CALCIUM 40 MILLIGRAM(S): 80 TABLET, FILM COATED ORAL at 21:07

## 2019-12-11 RX ADMIN — Medication 3 MILLILITER(S): at 18:36

## 2019-12-11 RX ADMIN — LIDOCAINE 1 PATCH: 4 CREAM TOPICAL at 14:13

## 2019-12-11 RX ADMIN — RISPERIDONE 0.5 MILLIGRAM(S): 4 TABLET ORAL at 14:14

## 2019-12-11 RX ADMIN — Medication 3 MILLILITER(S): at 05:17

## 2019-12-11 RX ADMIN — ENOXAPARIN SODIUM 40 MILLIGRAM(S): 100 INJECTION SUBCUTANEOUS at 14:14

## 2019-12-11 RX ADMIN — Medication 800 MILLIGRAM(S): at 21:49

## 2019-12-11 RX ADMIN — OXYCODONE AND ACETAMINOPHEN 2 TABLET(S): 5; 325 TABLET ORAL at 12:39

## 2019-12-11 RX ADMIN — Medication 13 UNIT(S): at 09:00

## 2019-12-11 RX ADMIN — RISPERIDONE 0.5 MILLIGRAM(S): 4 TABLET ORAL at 21:07

## 2019-12-11 NOTE — PROGRESS NOTE ADULT - PROBLEM SELECTOR PLAN 3
Cont curretn rx  12/7 continue current meds  12/8 continue home meds  12/9: no chest pain  12/10: stable  12/11: stable

## 2019-12-11 NOTE — PROGRESS NOTE ADULT - PROBLEM SELECTOR PLAN 1
Will decrease Lantus to 70u BID, decrease Humalog to 10u before each meal, and continue coverage scale. Will continue monitoring FS and FU.  Patient was on insulin regimen at rehab, A1C 10.4%. Suggest to DC to rehab on the above insulin regimen and FU endo 4 weeks.  Patient counseled for compliance with consistent low carb diet and exercise as tolerated outpatient. Patient was on insulin regimen at rehab, A1C 10.4%. Suggest to DC to rehab on the above insulin regimen and FU endo 4 weeks.  Patient counseled for compliance with consistent low carb diet and exercise as tolerated outpatient.

## 2019-12-11 NOTE — PROGRESS NOTE ADULT - SUBJECTIVE AND OBJECTIVE BOX
Patient is a 59y old  Male who presents with a chief complaint of SOB (11 Dec 2019 10:07)      Any change in ROS:   Erasmo pretty good: no sob : no wheezing  MEDICATIONS  (STANDING):  albuterol/ipratropium for Nebulization 3 milliLiter(s) Nebulizer every 6 hours  aspirin enteric coated 81 milliGRAM(s) Oral daily  atorvastatin 40 milliGRAM(s) Oral at bedtime  budesonide 160 MICROgram(s)/formoterol 4.5 MICROgram(s) Inhaler 2 Puff(s) Inhalation two times a day  clopidogrel Tablet 75 milliGRAM(s) Oral daily  dextrose 5%. 1000 milliLiter(s) (50 mL/Hr) IV Continuous <Continuous>  dextrose 50% Injectable 12.5 Gram(s) IV Push once  dextrose 50% Injectable 25 Gram(s) IV Push once  dextrose 50% Injectable 25 Gram(s) IV Push once  enoxaparin Injectable 40 milliGRAM(s) SubCutaneous daily  famotidine    Tablet 20 milliGRAM(s) Oral daily  insulin glargine Injectable (LANTUS) 73 Unit(s) SubCutaneous every morning  insulin glargine Injectable (LANTUS) 75 Unit(s) SubCutaneous at bedtime  insulin lispro (HumaLOG) corrective regimen sliding scale   SubCutaneous three times a day before meals  insulin lispro (HumaLOG) corrective regimen sliding scale   SubCutaneous at bedtime  insulin lispro Injectable (HumaLOG) 13 Unit(s) SubCutaneous three times a day with meals  melatonin 5 milliGRAM(s) Oral at bedtime  polyethylene glycol 3350 17 Gram(s) Oral daily  risperiDONE   Tablet 0.5 milliGRAM(s) Oral three times a day  senna 2 Tablet(s) Oral at bedtime    MEDICATIONS  (PRN):  ALPRAZolam 1 milliGRAM(s) Oral every 8 hours PRN panic attack  dextrose 40% Gel 15 Gram(s) Oral once PRN Blood Glucose LESS THAN 70 milliGRAM(s)/deciliter  glucagon  Injectable 1 milliGRAM(s) IntraMuscular once PRN Glucose LESS THAN 70 milligrams/deciliter  ibuprofen  Tablet. 800 milliGRAM(s) Oral three times a day PRN Moderate Pain (4 - 6)  lidocaine   Patch 1 Patch Transdermal daily PRN back pain  oxycodone    5 mG/acetaminophen 325 mG 2 Tablet(s) Oral every 6 hours PRN Severe Pain (7 - 10)    Vital Signs Last 24 Hrs  T(C): 36.7 (11 Dec 2019 04:58), Max: 36.7 (11 Dec 2019 04:58)  T(F): 98 (11 Dec 2019 04:58), Max: 98 (11 Dec 2019 04:58)  HR: 104 (11 Dec 2019 04:58) (104 - 108)  BP: 114/67 (11 Dec 2019 04:58) (114/67 - 128/77)  BP(mean): --  RR: 18 (11 Dec 2019 04:58) (18 - 18)  SpO2: 90% (11 Dec 2019 04:58) (90% - 92%)    I&O's Summary    10 Dec 2019 07:01  -  11 Dec 2019 07:00  --------------------------------------------------------  IN: 300 mL / OUT: 0 mL / NET: 300 mL          Physical Exam:   GENERAL: NAD, well-groomed, well-developed  HEENT: GALO/   Atraumatic, Normocephalic  ENMT: No tonsillar erythema, exudates, or enlargement; Moist mucous membranes, Good dentition, No lesions  NECK: Supple, No JVD, Normal thyroid  CHEST/LUNG: Clear to auscultaion, ; No rales, rhonchi, wheezing, or rubs  CVS: Regular rate and rhythm; No murmurs, rubs, or gallops  GI: : Soft, Nontender, Nondistended; Bowel sounds present  NERVOUS SYSTEM:  Alert & Oriented X3,  EXTREMITIES:  2+ Peripheral Pulses, No clubbing, cyanosis, or edema  LYMPH: No lymphadenopathy noted  SKIN: No rashes or lesions  ENDOCRINOLOGY: No Thyromegaly  PSYCH: Appropriate    Labs:                CAPILLARY BLOOD GLUCOSE      POCT Blood Glucose.: 130 mg/dL (11 Dec 2019 08:25)  POCT Blood Glucose.: 123 mg/dL (10 Dec 2019 21:23)  POCT Blood Glucose.: 145 mg/dL (10 Dec 2019 17:21)  POCT Blood Glucose.: 175 mg/dL (10 Dec 2019 12:41)              < from: CT Chest No Cont (12.03.19 @ 12:58) >  Sagittal and coronal reformats were performed.      FINDINGS:    AIRWAYS, LUNGS and PLEURA: Patent central airways. Mild bronchial wall   thickening. No bronchiectasis. Moderate upper lung predominant emphysema.   Mild bandlike atelectasis within the right middle lobe and lingula.   Otherwise, the lungs are clear without consolidation.    No pleural effusion.    MEDIASTINUM AND [place]: No lymphadenopathy.    HEART AND VESSELS: Heart size is normal. No pericardial effusion.   Thoracic aorta and pulmonary artery are normal in diameter. Moderate   calcific coronary atherosclerosis.    VISUALIZED UPPER ABDOMEN: Too small to characterize hypodensity within   the right lobe of the liver (series 2 image 109), unchanged.    CHEST WALL AND BONES: No aggressive osseous lesion. Bilateral   gynecomastia. Old fracture deformity of posterior lower ribs.    LOWER NECK: Within normal limits.    IMPRESSION:     No pneumonia.    Emphysema.                    ANA JONAS M.D., ATTENDING RADIOLOGIST  Thisdocument has been electronically signed. Dec  3 2019  1:32PM              < end of copied text >      RECENT CULTURES:        RESPIRATORY CULTURES:          Studies  Chest X-RAY  CT SCAN Chest   Venous Dopplers: LE:   CT Abdomen  Others

## 2019-12-11 NOTE — PROGRESS NOTE ADULT - PROBLEM SELECTOR PLAN 2
secondary to steroids: cotn insulin  12/5: cont to control per endo  12/*6: monitor and control per endo  12/8 Endo note appreciated   12/7 FS with sliding scale. Endo is following  12/9: per endo  12/10: controlled  12/11: controlled

## 2019-12-11 NOTE — PROGRESS NOTE ADULT - PROBLEM SELECTOR PLAN 1
Still feels sob: BUT NOT MUCH OF WHEEZING: He is an active smoker: Cont steroids as wellas BD and oxygen: HIS VENOUS ABG IS REVIEWED: HE LIKELY HAS CHR RETENTION AND MILD AC ON CHR HYPERCARBIC RESP FAILURE: WILL REPEAT HIS abg IN am: HE SEEMS COMFORTABLE: I DON'T THINK HE NEEDS BIPAP at this time  12/5: doing much better: change to po steroids and decrease the dose: no wheezing  12/6: Mild ac on chr hypercarbic resp failure: secondary to copd exacerbation: doing pretty well: alert and awake: no need for bipap  12/7 stable. continue current management  12/8 stable. diminished breath sounds no wheezing. wean off oxygen. goal of O2 sat greater than 88%  12/9  ; remains stable: off steorids: no wheezing  12/10: stable: no wheezing  12/11: doing pretty good: no sob : NO WHEEZING: OFF STEROIDS

## 2019-12-11 NOTE — PROGRESS NOTE ADULT - ASSESSMENT
Assessment  DMT2: 59y Male with DM T2 with hyperglycemia, A1C 10.4%, on large dose insulin, blood sugars within acceptable range, starting to trending down off steroid management, no hypoglycemic episodes. Patient is eating full meals, appears comfortable, planning DC to rehab.  COPD Exacerbation: on medications and nebulizer treatment, stable, monitored.  HTN: Controlled,  on antihypertensive medications.  HLD: Controlled, on statin.  Schizophrenia: On meds.  Obesity: No strict exercise routines, not on any weight loss program, neither on low calorie diet.          Laury Romero MD  Cell: [phone]  Office: [phone] Assessment  DMT2: 59y Male with DM T2 with hyperglycemia, A1C 10.4%, on large dose insulin, blood sugars within acceptable range, starting to trending down off steroid management,  no hypoglycemic episodes. Patient is eating full meals, appears comfortable, planning DC to rehab.  COPD Exacerbation: on medications and nebulizer treatment, stable, monitored.  HTN: Controlled,  on antihypertensive medications.  HLD: Controlled, on statin.  Schizophrenia: On meds.  Obesity: No strict exercise routines, not on any weight loss program, neither on low calorie diet.          Laury Romero MD  Cell: [phone]  Office: [phone]

## 2019-12-11 NOTE — PROGRESS NOTE ADULT - SUBJECTIVE AND OBJECTIVE BOX
Patient seen and examined at bedside  No acute events noted overnight  Case discussed with medical team    HPI:  59 year-old male with history of COPD on 2L NC @ home, pulmonary HTN, CAD and stents, IDDM2, paranoid schizophrenia, anxietyp/w worsening SOB and cough for 1 week from long term NH.  He has been there for the past 8 months and states that he was not getting his nebs ATC and sometimes even PRN, thinks that exacerbated his SOB/COPD.  Active tobacco smoker, 2 cigs/day currently.  Denies fevers, chills, nausea, vomiting, abdominal pain, LE edema or sick contact. Wants Benzo to calm him down and to sleep. (03 Dec 2019 04:54)      PAST MEDICAL & SURGICAL HISTORY:  Oxygen dependent  Gastroesophageal reflux disease, esophagitis presence not specified  Smoker  Bipolar 1 disorder  Coronary artery disease involving native coronary artery of native heart without angina pectoris  Type 2 diabetes mellitus without complication, with long-term current use of insulin  Pulmonary HTN  HLD (hyperlipidemia)  Stented coronary artery  COPD (chronic obstructive pulmonary disease)  No significant past surgical history      No Known Allergies       MEDICATIONS  (STANDING):  albuterol/ipratropium for Nebulization 3 milliLiter(s) Nebulizer every 6 hours  aspirin enteric coated 81 milliGRAM(s) Oral daily  atorvastatin 40 milliGRAM(s) Oral at bedtime  budesonide 160 MICROgram(s)/formoterol 4.5 MICROgram(s) Inhaler 2 Puff(s) Inhalation two times a day  clopidogrel Tablet 75 milliGRAM(s) Oral daily  dextrose 5%. 1000 milliLiter(s) (50 mL/Hr) IV Continuous <Continuous>  dextrose 50% Injectable 12.5 Gram(s) IV Push once  dextrose 50% Injectable 25 Gram(s) IV Push once  dextrose 50% Injectable 25 Gram(s) IV Push once  enoxaparin Injectable 40 milliGRAM(s) SubCutaneous daily  famotidine    Tablet 20 milliGRAM(s) Oral daily  insulin glargine Injectable (LANTUS) 73 Unit(s) SubCutaneous every morning  insulin glargine Injectable (LANTUS) 75 Unit(s) SubCutaneous at bedtime  insulin lispro (HumaLOG) corrective regimen sliding scale   SubCutaneous three times a day before meals  insulin lispro (HumaLOG) corrective regimen sliding scale   SubCutaneous at bedtime  insulin lispro Injectable (HumaLOG) 13 Unit(s) SubCutaneous three times a day with meals  melatonin 5 milliGRAM(s) Oral at bedtime  polyethylene glycol 3350 17 Gram(s) Oral daily  risperiDONE   Tablet 0.5 milliGRAM(s) Oral three times a day  senna 2 Tablet(s) Oral at bedtime    MEDICATIONS  (PRN):  ALPRAZolam 1 milliGRAM(s) Oral every 8 hours PRN panic attack  dextrose 40% Gel 15 Gram(s) Oral once PRN Blood Glucose LESS THAN 70 milliGRAM(s)/deciliter  glucagon  Injectable 1 milliGRAM(s) IntraMuscular once PRN Glucose LESS THAN 70 milligrams/deciliter  ibuprofen  Tablet. 800 milliGRAM(s) Oral three times a day PRN Moderate Pain (4 - 6)  lidocaine   Patch 1 Patch Transdermal daily PRN back pain  oxycodone    5 mG/acetaminophen 325 mG 2 Tablet(s) Oral every 6 hours PRN Severe Pain (7 - 10)      REVIEW OF SYSTEMS:  CONSTITUTIONAL: (+) malaise.   EYES: No acute change in vision   ENT:  No tinnitus  NECK: No stiffness  RESPIRATORY: No hemoptysis  CARDIOVASCULAR: No chest pain, palpitations, syncope  GASTROINTESTINAL: No hematemesis, diarrhea, melena, or hematochezia.  GENITOURINARY: No hematuria  NEUROLOGICAL: No headaches  LYMPH Nodes: No enlarged glands  ENDOCRINE: No heat or cold intolerance	    T(C): 36.7 (12-11-19 @ 04:58), Max: 36.7 (12-11-19 @ 04:58)  HR: 104 (12-11-19 @ 04:58) (104 - 108)  BP: 114/67 (12-11-19 @ 04:58) (114/67 - 128/77)  RR: 18 (12-11-19 @ 04:58) (18 - 18)  SpO2: 90% (12-11-19 @ 04:58) (90% - 92%)    PHYSICAL EXAMINATION:   Constitutional: WD, NAD  HEENT: NC, AT  Neck:  Supple  Respiratory:  Adequate airflow b/l. Not using accessory muscles of respiration.  Cardiovascular:  S1 & S2 intact, no R/G, 2+ radial pulses b/l  Gastrointestinal: Soft, NT, ND, normoactive b.s., no organomegaly/RT/rigidity  Extremities: WWP  Neurological:  Alert and awake.  No acute focal motor deficits. Crude sensation intact.     Labs and imaging reviewed    LABS:                  CAPILLARY BLOOD GLUCOSE      POCT Blood Glucose.: 130 mg/dL (11 Dec 2019 08:25)  POCT Blood Glucose.: 123 mg/dL (10 Dec 2019 21:23)  POCT Blood Glucose.: 145 mg/dL (10 Dec 2019 17:21)  POCT Blood Glucose.: 175 mg/dL (10 Dec 2019 12:41)  POCT Blood Glucose.: 191 mg/dL (10 Dec 2019 10:33)              RADIOLOGY & ADDITIONAL STUDIES:

## 2019-12-11 NOTE — PROGRESS NOTE ADULT - SUBJECTIVE AND OBJECTIVE BOX
Chief complaint  Patient is a 59y old  Male who presents with a chief complaint of SOB (11 Dec 2019 11:28)   Review of systems  Patient sitting up in bed, looks comfortable, no fever, no hypoglycemia.    Labs and Fingersticks  CAPILLARY BLOOD GLUCOSE      POCT Blood Glucose.: 72 mg/dL (11 Dec 2019 12:31)  POCT Blood Glucose.: 130 mg/dL (11 Dec 2019 08:25)  POCT Blood Glucose.: 123 mg/dL (10 Dec 2019 21:23)  POCT Blood Glucose.: 145 mg/dL (10 Dec 2019 17:21)                        Medications  MEDICATIONS  (STANDING):  albuterol/ipratropium for Nebulization 3 milliLiter(s) Nebulizer every 6 hours  aspirin enteric coated 81 milliGRAM(s) Oral daily  atorvastatin 40 milliGRAM(s) Oral at bedtime  budesonide 160 MICROgram(s)/formoterol 4.5 MICROgram(s) Inhaler 2 Puff(s) Inhalation two times a day  clopidogrel Tablet 75 milliGRAM(s) Oral daily  dextrose 5%. 1000 milliLiter(s) (50 mL/Hr) IV Continuous <Continuous>  dextrose 50% Injectable 12.5 Gram(s) IV Push once  dextrose 50% Injectable 25 Gram(s) IV Push once  dextrose 50% Injectable 25 Gram(s) IV Push once  enoxaparin Injectable 40 milliGRAM(s) SubCutaneous daily  famotidine    Tablet 20 milliGRAM(s) Oral daily  insulin glargine Injectable (LANTUS) 70 Unit(s) SubCutaneous at bedtime  insulin glargine Injectable (LANTUS) 70 Unit(s) SubCutaneous every morning  insulin lispro (HumaLOG) corrective regimen sliding scale   SubCutaneous at bedtime  insulin lispro (HumaLOG) corrective regimen sliding scale   SubCutaneous three times a day before meals  insulin lispro Injectable (HumaLOG) 10 Unit(s) SubCutaneous three times a day with meals  melatonin 5 milliGRAM(s) Oral at bedtime  polyethylene glycol 3350 17 Gram(s) Oral daily  risperiDONE   Tablet 0.5 milliGRAM(s) Oral three times a day  senna 2 Tablet(s) Oral at bedtime      Physical Exam  General: Patient comfortable in bed  Vital Signs Last 12 Hrs  T(F): 98.3 (12-11-19 @ 11:46), Max: 98.3 (12-11-19 @ 11:46)  HR: 110 (12-11-19 @ 12:17) (104 - 114)  BP: 129/74 (12-11-19 @ 11:46) (114/67 - 129/74)  BP(mean): --  RR: 20 (12-11-19 @ 12:17) (18 - 20)  SpO2: 90% (12-11-19 @ 12:17) (90% - 91%)  Neck: No palpable thyroid nodules.  CVS: S1S2, No murmurs  Respiratory: No wheezing, no crepitations  GI: Abdomen soft, bowel sounds positive  Musculoskeletal:  edema lower extremities.   Skin: No skin rashes, no ecchymosis    Diagnostics Chief complaint  Patient is a 59y old  Male who presents with a chief complaint of SOB (11 Dec 2019 11:28)   Review of systems  Patient sitting up in bed, looks comfortable, no fever,  no hypoglycemia.    Labs and Fingersticks  CAPILLARY BLOOD GLUCOSE      POCT Blood Glucose.: 72 mg/dL (11 Dec 2019 12:31)  POCT Blood Glucose.: 130 mg/dL (11 Dec 2019 08:25)  POCT Blood Glucose.: 123 mg/dL (10 Dec 2019 21:23)  POCT Blood Glucose.: 145 mg/dL (10 Dec 2019 17:21)                        Medications  MEDICATIONS  (STANDING):  albuterol/ipratropium for Nebulization 3 milliLiter(s) Nebulizer every 6 hours  aspirin enteric coated 81 milliGRAM(s) Oral daily  atorvastatin 40 milliGRAM(s) Oral at bedtime  budesonide 160 MICROgram(s)/formoterol 4.5 MICROgram(s) Inhaler 2 Puff(s) Inhalation two times a day  clopidogrel Tablet 75 milliGRAM(s) Oral daily  dextrose 5%. 1000 milliLiter(s) (50 mL/Hr) IV Continuous <Continuous>  dextrose 50% Injectable 12.5 Gram(s) IV Push once  dextrose 50% Injectable 25 Gram(s) IV Push once  dextrose 50% Injectable 25 Gram(s) IV Push once  enoxaparin Injectable 40 milliGRAM(s) SubCutaneous daily  famotidine    Tablet 20 milliGRAM(s) Oral daily  insulin glargine Injectable (LANTUS) 70 Unit(s) SubCutaneous at bedtime  insulin glargine Injectable (LANTUS) 70 Unit(s) SubCutaneous every morning  insulin lispro (HumaLOG) corrective regimen sliding scale   SubCutaneous at bedtime  insulin lispro (HumaLOG) corrective regimen sliding scale   SubCutaneous three times a day before meals  insulin lispro Injectable (HumaLOG) 10 Unit(s) SubCutaneous three times a day with meals  melatonin 5 milliGRAM(s) Oral at bedtime  polyethylene glycol 3350 17 Gram(s) Oral daily  risperiDONE   Tablet 0.5 milliGRAM(s) Oral three times a day  senna 2 Tablet(s) Oral at bedtime      Physical Exam  General: Patient comfortable in bed  Vital Signs Last 12 Hrs  T(F): 98.3 (12-11-19 @ 11:46), Max: 98.3 (12-11-19 @ 11:46)  HR: 110 (12-11-19 @ 12:17) (104 - 114)  BP: 129/74 (12-11-19 @ 11:46) (114/67 - 129/74)  BP(mean): --  RR: 20 (12-11-19 @ 12:17) (18 - 20)  SpO2: 90% (12-11-19 @ 12:17) (90% - 91%)  Neck: No palpable thyroid nodules.  CVS: S1S2, No murmurs  Respiratory: No wheezing, no crepitations  GI: Abdomen soft, bowel sounds positive  Musculoskeletal:  edema lower extremities.   Skin: No skin rashes, no ecchymosis    Diagnostics

## 2019-12-12 VITALS
HEART RATE: 109 BPM | DIASTOLIC BLOOD PRESSURE: 66 MMHG | OXYGEN SATURATION: 98 % | RESPIRATION RATE: 19 BRPM | SYSTOLIC BLOOD PRESSURE: 114 MMHG | TEMPERATURE: 98 F

## 2019-12-12 LAB
GLUCOSE BLDC GLUCOMTR-MCNC: 111 MG/DL — HIGH (ref 70–99)
GLUCOSE BLDC GLUCOMTR-MCNC: 124 MG/DL — HIGH (ref 70–99)
GLUCOSE BLDC GLUCOMTR-MCNC: 165 MG/DL — HIGH (ref 70–99)
GLUCOSE BLDC GLUCOMTR-MCNC: 47 MG/DL — LOW (ref 70–99)
GLUCOSE BLDC GLUCOMTR-MCNC: 49 MG/DL — LOW (ref 70–99)
GLUCOSE BLDC GLUCOMTR-MCNC: 51 MG/DL — LOW (ref 70–99)
GLUCOSE BLDC GLUCOMTR-MCNC: 52 MG/DL — LOW (ref 70–99)
GLUCOSE BLDC GLUCOMTR-MCNC: 99 MG/DL — SIGNIFICANT CHANGE UP (ref 70–99)

## 2019-12-12 PROCEDURE — 94640 AIRWAY INHALATION TREATMENT: CPT

## 2019-12-12 PROCEDURE — 82330 ASSAY OF CALCIUM: CPT

## 2019-12-12 PROCEDURE — 82435 ASSAY OF BLOOD CHLORIDE: CPT

## 2019-12-12 PROCEDURE — 87633 RESP VIRUS 12-25 TARGETS: CPT

## 2019-12-12 PROCEDURE — 96374 THER/PROPH/DIAG INJ IV PUSH: CPT

## 2019-12-12 PROCEDURE — 99285 EMERGENCY DEPT VISIT HI MDM: CPT | Mod: 25

## 2019-12-12 PROCEDURE — 87581 M.PNEUMON DNA AMP PROBE: CPT

## 2019-12-12 PROCEDURE — 96375 TX/PRO/DX INJ NEW DRUG ADDON: CPT

## 2019-12-12 PROCEDURE — 99238 HOSP IP/OBS DSCHRG MGMT 30/<: CPT

## 2019-12-12 PROCEDURE — 84132 ASSAY OF SERUM POTASSIUM: CPT

## 2019-12-12 PROCEDURE — 80053 COMPREHEN METABOLIC PANEL: CPT

## 2019-12-12 PROCEDURE — 85027 COMPLETE CBC AUTOMATED: CPT

## 2019-12-12 PROCEDURE — 84295 ASSAY OF SERUM SODIUM: CPT

## 2019-12-12 PROCEDURE — 94664 DEMO&/EVAL PT USE INHALER: CPT

## 2019-12-12 PROCEDURE — 71250 CT THORAX DX C-: CPT

## 2019-12-12 PROCEDURE — 83735 ASSAY OF MAGNESIUM: CPT

## 2019-12-12 PROCEDURE — 83880 ASSAY OF NATRIURETIC PEPTIDE: CPT

## 2019-12-12 PROCEDURE — 83605 ASSAY OF LACTIC ACID: CPT

## 2019-12-12 PROCEDURE — 85610 PROTHROMBIN TIME: CPT

## 2019-12-12 PROCEDURE — 87798 DETECT AGENT NOS DNA AMP: CPT

## 2019-12-12 PROCEDURE — 85730 THROMBOPLASTIN TIME PARTIAL: CPT

## 2019-12-12 PROCEDURE — 85014 HEMATOCRIT: CPT

## 2019-12-12 PROCEDURE — 84484 ASSAY OF TROPONIN QUANT: CPT

## 2019-12-12 PROCEDURE — 80048 BASIC METABOLIC PNL TOTAL CA: CPT

## 2019-12-12 PROCEDURE — 93005 ELECTROCARDIOGRAM TRACING: CPT

## 2019-12-12 PROCEDURE — 97161 PT EVAL LOW COMPLEX 20 MIN: CPT

## 2019-12-12 PROCEDURE — 83930 ASSAY OF BLOOD OSMOLALITY: CPT

## 2019-12-12 PROCEDURE — 82962 GLUCOSE BLOOD TEST: CPT

## 2019-12-12 PROCEDURE — 84300 ASSAY OF URINE SODIUM: CPT

## 2019-12-12 PROCEDURE — 82533 TOTAL CORTISOL: CPT

## 2019-12-12 PROCEDURE — 36600 WITHDRAWAL OF ARTERIAL BLOOD: CPT

## 2019-12-12 PROCEDURE — 82803 BLOOD GASES ANY COMBINATION: CPT

## 2019-12-12 PROCEDURE — 87486 CHLMYD PNEUM DNA AMP PROBE: CPT

## 2019-12-12 PROCEDURE — 83935 ASSAY OF URINE OSMOLALITY: CPT

## 2019-12-12 PROCEDURE — 71045 X-RAY EXAM CHEST 1 VIEW: CPT

## 2019-12-12 PROCEDURE — 84145 PROCALCITONIN (PCT): CPT

## 2019-12-12 PROCEDURE — 82947 ASSAY GLUCOSE BLOOD QUANT: CPT

## 2019-12-12 PROCEDURE — 83036 HEMOGLOBIN GLYCOSYLATED A1C: CPT

## 2019-12-12 RX ORDER — IBUPROFEN 200 MG
1 TABLET ORAL
Qty: 0 | Refills: 0 | DISCHARGE
Start: 2019-12-12

## 2019-12-12 RX ORDER — INSULIN GLARGINE 100 [IU]/ML
60 INJECTION, SOLUTION SUBCUTANEOUS AT BEDTIME
Refills: 0 | Status: DISCONTINUED | OUTPATIENT
Start: 2019-12-12 | End: 2019-12-12

## 2019-12-12 RX ORDER — IBUPROFEN 200 MG
1 TABLET ORAL
Qty: 0 | Refills: 0 | DISCHARGE

## 2019-12-12 RX ORDER — FUROSEMIDE 40 MG
1 TABLET ORAL
Qty: 0 | Refills: 0 | DISCHARGE
Start: 2019-12-12

## 2019-12-12 RX ORDER — DEXTROSE 50 % IN WATER 50 %
15 SYRINGE (ML) INTRAVENOUS ONCE
Refills: 0 | Status: COMPLETED | OUTPATIENT
Start: 2019-12-12 | End: 2019-12-12

## 2019-12-12 RX ORDER — INSULIN LISPRO 100/ML
24 VIAL (ML) SUBCUTANEOUS
Qty: 0 | Refills: 0 | DISCHARGE

## 2019-12-12 RX ORDER — ACETAMINOPHEN 500 MG
2 TABLET ORAL
Qty: 0 | Refills: 0 | DISCHARGE

## 2019-12-12 RX ORDER — ALPRAZOLAM 0.25 MG
1 TABLET ORAL
Qty: 0 | Refills: 0 | DISCHARGE
Start: 2019-12-12

## 2019-12-12 RX ORDER — FUROSEMIDE 40 MG
20 TABLET ORAL DAILY
Refills: 0 | Status: DISCONTINUED | OUTPATIENT
Start: 2019-12-12 | End: 2019-12-12

## 2019-12-12 RX ORDER — POLYETHYLENE GLYCOL 3350 17 G/17G
17 POWDER, FOR SOLUTION ORAL
Qty: 0 | Refills: 0 | DISCHARGE
Start: 2019-12-12

## 2019-12-12 RX ORDER — INSULIN GLARGINE 100 [IU]/ML
60 INJECTION, SOLUTION SUBCUTANEOUS EVERY MORNING
Refills: 0 | Status: DISCONTINUED | OUTPATIENT
Start: 2019-12-12 | End: 2019-12-12

## 2019-12-12 RX ORDER — LANOLIN ALCOHOL/MO/W.PET/CERES
1 CREAM (GRAM) TOPICAL
Qty: 0 | Refills: 0 | DISCHARGE

## 2019-12-12 RX ORDER — SENNA PLUS 8.6 MG/1
2 TABLET ORAL
Qty: 0 | Refills: 0 | DISCHARGE
Start: 2019-12-12

## 2019-12-12 RX ORDER — LANOLIN ALCOHOL/MO/W.PET/CERES
1 CREAM (GRAM) TOPICAL
Qty: 0 | Refills: 0 | DISCHARGE
Start: 2019-12-12

## 2019-12-12 RX ORDER — BUDESONIDE AND FORMOTEROL FUMARATE DIHYDRATE 160; 4.5 UG/1; UG/1
2 AEROSOL RESPIRATORY (INHALATION)
Qty: 0 | Refills: 0 | DISCHARGE
Start: 2019-12-12

## 2019-12-12 RX ADMIN — Medication 10 UNIT(S): at 13:09

## 2019-12-12 RX ADMIN — Medication 1: at 13:09

## 2019-12-12 RX ADMIN — FAMOTIDINE 20 MILLIGRAM(S): 10 INJECTION INTRAVENOUS at 13:08

## 2019-12-12 RX ADMIN — Medication 81 MILLIGRAM(S): at 13:09

## 2019-12-12 RX ADMIN — Medication 3 MILLILITER(S): at 13:09

## 2019-12-12 RX ADMIN — OXYCODONE AND ACETAMINOPHEN 2 TABLET(S): 5; 325 TABLET ORAL at 16:22

## 2019-12-12 RX ADMIN — CLOPIDOGREL BISULFATE 75 MILLIGRAM(S): 75 TABLET, FILM COATED ORAL at 13:09

## 2019-12-12 RX ADMIN — Medication 3 MILLILITER(S): at 05:22

## 2019-12-12 RX ADMIN — ENOXAPARIN SODIUM 40 MILLIGRAM(S): 100 INJECTION SUBCUTANEOUS at 13:09

## 2019-12-12 RX ADMIN — LIDOCAINE 1 PATCH: 4 CREAM TOPICAL at 02:41

## 2019-12-12 RX ADMIN — BUDESONIDE AND FORMOTEROL FUMARATE DIHYDRATE 2 PUFF(S): 160; 4.5 AEROSOL RESPIRATORY (INHALATION) at 05:23

## 2019-12-12 RX ADMIN — RISPERIDONE 0.5 MILLIGRAM(S): 4 TABLET ORAL at 05:22

## 2019-12-12 RX ADMIN — OXYCODONE AND ACETAMINOPHEN 2 TABLET(S): 5; 325 TABLET ORAL at 11:45

## 2019-12-12 RX ADMIN — OXYCODONE AND ACETAMINOPHEN 2 TABLET(S): 5; 325 TABLET ORAL at 10:59

## 2019-12-12 RX ADMIN — Medication 800 MILLIGRAM(S): at 13:10

## 2019-12-12 RX ADMIN — RISPERIDONE 0.5 MILLIGRAM(S): 4 TABLET ORAL at 13:09

## 2019-12-12 RX ADMIN — Medication 15 GRAM(S): at 09:54

## 2019-12-12 NOTE — PROGRESS NOTE ADULT - SUBJECTIVE AND OBJECTIVE BOX
Chief complaint  Patient is a 59y old  Male who presents with a chief complaint of SOB (12 Dec 2019 14:08)   Review of systems  Patient in bed, looks comfortable, no fever,  had hypoglycemic episode this AM.    Labs and Fingersticks  CAPILLARY BLOOD GLUCOSE      POCT Blood Glucose.: 165 mg/dL (12 Dec 2019 12:34)  POCT Blood Glucose.: 124 mg/dL (12 Dec 2019 10:40)  POCT Blood Glucose.: 99 mg/dL (12 Dec 2019 10:38)  POCT Blood Glucose.: 111 mg/dL (12 Dec 2019 10:18)  POCT Blood Glucose.: 52 mg/dL (12 Dec 2019 09:44)  POCT Blood Glucose.: 49 mg/dL (12 Dec 2019 09:43)  POCT Blood Glucose.: 47 mg/dL (12 Dec 2019 09:29)  POCT Blood Glucose.: 51 mg/dL (12 Dec 2019 09:28)  POCT Blood Glucose.: 141 mg/dL (11 Dec 2019 21:15)  POCT Blood Glucose.: 91 mg/dL (11 Dec 2019 17:31)                        Medications  MEDICATIONS  (STANDING):  albuterol/ipratropium for Nebulization 3 milliLiter(s) Nebulizer every 6 hours  aspirin enteric coated 81 milliGRAM(s) Oral daily  atorvastatin 40 milliGRAM(s) Oral at bedtime  budesonide 160 MICROgram(s)/formoterol 4.5 MICROgram(s) Inhaler 2 Puff(s) Inhalation two times a day  clopidogrel Tablet 75 milliGRAM(s) Oral daily  dextrose 5%. 1000 milliLiter(s) (50 mL/Hr) IV Continuous <Continuous>  dextrose 50% Injectable 12.5 Gram(s) IV Push once  dextrose 50% Injectable 25 Gram(s) IV Push once  dextrose 50% Injectable 25 Gram(s) IV Push once  enoxaparin Injectable 40 milliGRAM(s) SubCutaneous daily  famotidine    Tablet 20 milliGRAM(s) Oral daily  furosemide    Tablet 20 milliGRAM(s) Oral daily  insulin glargine Injectable (LANTUS) 60 Unit(s) SubCutaneous at bedtime  insulin glargine Injectable (LANTUS) 60 Unit(s) SubCutaneous every morning  insulin lispro (HumaLOG) corrective regimen sliding scale   SubCutaneous at bedtime  insulin lispro (HumaLOG) corrective regimen sliding scale   SubCutaneous three times a day before meals  insulin lispro Injectable (HumaLOG) 10 Unit(s) SubCutaneous three times a day with meals  melatonin 5 milliGRAM(s) Oral at bedtime  polyethylene glycol 3350 17 Gram(s) Oral daily  predniSONE   Tablet 30 milliGRAM(s) Oral daily  risperiDONE   Tablet 0.5 milliGRAM(s) Oral three times a day  senna 2 Tablet(s) Oral at bedtime      Physical Exam  General: Patient comfortable in bed  Vital Signs Last 12 Hrs  T(F): 98 (12-12-19 @ 11:36), Max: 98.1 (12-12-19 @ 05:24)  HR: 109 (12-12-19 @ 11:36) (95 - 109)  BP: 114/66 (12-12-19 @ 11:36) (114/66 - 144/68)  BP(mean): --  RR: 19 (12-12-19 @ 11:36) (19 - 19)  SpO2: 98% (12-12-19 @ 11:36) (95% - 98%)  Neck: No palpable thyroid nodules.  CVS: S1S2, No murmurs  Respiratory: No wheezing, no crepitations  GI: Abdomen soft, bowel sounds positive  Musculoskeletal:  edema lower extremities.   Skin: No skin rashes, no ecchymosis    Diagnostics Chief complaint  Patient is a 59y old  Male who presents with a chief complaint of SOB (12 Dec 2019 14:08)   Review of systems  Patient in bed, looks comfortable, no fever,   had hypoglycemic episode this AM.    Labs and Fingersticks  CAPILLARY BLOOD GLUCOSE      POCT Blood Glucose.: 165 mg/dL (12 Dec 2019 12:34)  POCT Blood Glucose.: 124 mg/dL (12 Dec 2019 10:40)  POCT Blood Glucose.: 99 mg/dL (12 Dec 2019 10:38)  POCT Blood Glucose.: 111 mg/dL (12 Dec 2019 10:18)  POCT Blood Glucose.: 52 mg/dL (12 Dec 2019 09:44)  POCT Blood Glucose.: 49 mg/dL (12 Dec 2019 09:43)  POCT Blood Glucose.: 47 mg/dL (12 Dec 2019 09:29)  POCT Blood Glucose.: 51 mg/dL (12 Dec 2019 09:28)  POCT Blood Glucose.: 141 mg/dL (11 Dec 2019 21:15)  POCT Blood Glucose.: 91 mg/dL (11 Dec 2019 17:31)                        Medications  MEDICATIONS  (STANDING):  albuterol/ipratropium for Nebulization 3 milliLiter(s) Nebulizer every 6 hours  aspirin enteric coated 81 milliGRAM(s) Oral daily  atorvastatin 40 milliGRAM(s) Oral at bedtime  budesonide 160 MICROgram(s)/formoterol 4.5 MICROgram(s) Inhaler 2 Puff(s) Inhalation two times a day  clopidogrel Tablet 75 milliGRAM(s) Oral daily  dextrose 5%. 1000 milliLiter(s) (50 mL/Hr) IV Continuous <Continuous>  dextrose 50% Injectable 12.5 Gram(s) IV Push once  dextrose 50% Injectable 25 Gram(s) IV Push once  dextrose 50% Injectable 25 Gram(s) IV Push once  enoxaparin Injectable 40 milliGRAM(s) SubCutaneous daily  famotidine    Tablet 20 milliGRAM(s) Oral daily  furosemide    Tablet 20 milliGRAM(s) Oral daily  insulin glargine Injectable (LANTUS) 60 Unit(s) SubCutaneous at bedtime  insulin glargine Injectable (LANTUS) 60 Unit(s) SubCutaneous every morning  insulin lispro (HumaLOG) corrective regimen sliding scale   SubCutaneous at bedtime  insulin lispro (HumaLOG) corrective regimen sliding scale   SubCutaneous three times a day before meals  insulin lispro Injectable (HumaLOG) 10 Unit(s) SubCutaneous three times a day with meals  melatonin 5 milliGRAM(s) Oral at bedtime  polyethylene glycol 3350 17 Gram(s) Oral daily  predniSONE   Tablet 30 milliGRAM(s) Oral daily  risperiDONE   Tablet 0.5 milliGRAM(s) Oral three times a day  senna 2 Tablet(s) Oral at bedtime      Physical Exam  General: Patient comfortable in bed  Vital Signs Last 12 Hrs  T(F): 98 (12-12-19 @ 11:36), Max: 98.1 (12-12-19 @ 05:24)  HR: 109 (12-12-19 @ 11:36) (95 - 109)  BP: 114/66 (12-12-19 @ 11:36) (114/66 - 144/68)  BP(mean): --  RR: 19 (12-12-19 @ 11:36) (19 - 19)  SpO2: 98% (12-12-19 @ 11:36) (95% - 98%)  Neck: No palpable thyroid nodules.  CVS: S1S2, No murmurs  Respiratory: No wheezing, no crepitations  GI: Abdomen soft, bowel sounds positive  Musculoskeletal:  edema lower extremities.   Skin: No skin rashes, no ecchymosis    Diagnostics

## 2019-12-12 NOTE — PROGRESS NOTE ADULT - SUBJECTIVE AND OBJECTIVE BOX
Patient seen and examined at bedside  No acute events noted overnight  Case discussed with medical team    HPI:  59 year-old male with history of COPD on 2L NC @ home, pulmonary HTN, CAD and stents, IDDM2, paranoid schizophrenia, anxietyp/w worsening SOB and cough for 1 week from long term NH.  He has been there for the past 8 months and states that he was not getting his nebs ATC and sometimes even PRN, thinks that exacerbated his SOB/COPD.  Active tobacco smoker, 2 cigs/day currently.  Denies fevers, chills, nausea, vomiting, abdominal pain, LE edema or sick contact. Wants Benzo to calm him down and to sleep. (03 Dec 2019 04:54)      PAST MEDICAL & SURGICAL HISTORY:  Oxygen dependent  Gastroesophageal reflux disease, esophagitis presence not specified  Smoker  Bipolar 1 disorder  Coronary artery disease involving native coronary artery of native heart without angina pectoris  Type 2 diabetes mellitus without complication, with long-term current use of insulin  Pulmonary HTN  HLD (hyperlipidemia)  Stented coronary artery  COPD (chronic obstructive pulmonary disease)  No significant past surgical history      No Known Allergies       MEDICATIONS  (STANDING):  albuterol/ipratropium for Nebulization 3 milliLiter(s) Nebulizer every 6 hours  aspirin enteric coated 81 milliGRAM(s) Oral daily  atorvastatin 40 milliGRAM(s) Oral at bedtime  budesonide 160 MICROgram(s)/formoterol 4.5 MICROgram(s) Inhaler 2 Puff(s) Inhalation two times a day  clopidogrel Tablet 75 milliGRAM(s) Oral daily  dextrose 5%. 1000 milliLiter(s) (50 mL/Hr) IV Continuous <Continuous>  dextrose 50% Injectable 12.5 Gram(s) IV Push once  dextrose 50% Injectable 25 Gram(s) IV Push once  dextrose 50% Injectable 25 Gram(s) IV Push once  enoxaparin Injectable 40 milliGRAM(s) SubCutaneous daily  famotidine    Tablet 20 milliGRAM(s) Oral daily  furosemide    Tablet 20 milliGRAM(s) Oral daily  insulin glargine Injectable (LANTUS) 60 Unit(s) SubCutaneous at bedtime  insulin glargine Injectable (LANTUS) 60 Unit(s) SubCutaneous every morning  insulin lispro (HumaLOG) corrective regimen sliding scale   SubCutaneous at bedtime  insulin lispro (HumaLOG) corrective regimen sliding scale   SubCutaneous three times a day before meals  insulin lispro Injectable (HumaLOG) 10 Unit(s) SubCutaneous three times a day with meals  melatonin 5 milliGRAM(s) Oral at bedtime  polyethylene glycol 3350 17 Gram(s) Oral daily  predniSONE   Tablet 30 milliGRAM(s) Oral daily  risperiDONE   Tablet 0.5 milliGRAM(s) Oral three times a day  senna 2 Tablet(s) Oral at bedtime    MEDICATIONS  (PRN):  ALPRAZolam 1 milliGRAM(s) Oral every 8 hours PRN panic attack  dextrose 40% Gel 15 Gram(s) Oral once PRN Blood Glucose LESS THAN 70 milliGRAM(s)/deciliter  glucagon  Injectable 1 milliGRAM(s) IntraMuscular once PRN Glucose LESS THAN 70 milligrams/deciliter  ibuprofen  Tablet. 800 milliGRAM(s) Oral three times a day PRN Moderate Pain (4 - 6)  lidocaine   Patch 1 Patch Transdermal daily PRN back pain  oxycodone    5 mG/acetaminophen 325 mG 2 Tablet(s) Oral every 6 hours PRN Severe Pain (7 - 10)      REVIEW OF SYSTEMS:  CONSTITUTIONAL: (+) malaise.   EYES: No acute change in vision   ENT:  No tinnitus  NECK: No stiffness  RESPIRATORY: No hemoptysis  CARDIOVASCULAR: No chest pain, palpitations, syncope  GASTROINTESTINAL: No hematemesis, diarrhea, melena, or hematochezia.  GENITOURINARY: No hematuria  NEUROLOGICAL: No headaches  LYMPH Nodes: No enlarged glands  ENDOCRINE: No heat or cold intolerance	    T(C): 36.7 (12-12-19 @ 11:36), Max: 36.7 (12-12-19 @ 05:24)  HR: 109 (12-12-19 @ 11:36) (95 - 114)  BP: 114/66 (12-12-19 @ 11:36) (114/66 - 144/68)  RR: 19 (12-12-19 @ 11:36) (19 - 21)  SpO2: 98% (12-12-19 @ 11:36) (90% - 98%)    PHYSICAL EXAMINATION:   Constitutional: WD, NAD  HEENT: NC, AT  Neck:  Supple  Respiratory:  mild end expiratory wheezing R>L. Adequate airflow b/l. Not using accessory muscles of respiration.  Cardiovascular:  S1 & S2 intact, no R/G, 2+ radial pulses b/l  Gastrointestinal: Soft, NT, ND, normoactive b.s., no organomegaly/RT/rigidity  Extremities: WWP  Neurological:  Alert and awake.  No acute focal motor deficits. Crude sensation intact.     Labs and imaging reviewed    LABS:                  CAPILLARY BLOOD GLUCOSE      POCT Blood Glucose.: 124 mg/dL (12 Dec 2019 10:40)  POCT Blood Glucose.: 99 mg/dL (12 Dec 2019 10:38)  POCT Blood Glucose.: 111 mg/dL (12 Dec 2019 10:18)  POCT Blood Glucose.: 52 mg/dL (12 Dec 2019 09:44)  POCT Blood Glucose.: 49 mg/dL (12 Dec 2019 09:43)  POCT Blood Glucose.: 47 mg/dL (12 Dec 2019 09:29)  POCT Blood Glucose.: 51 mg/dL (12 Dec 2019 09:28)  POCT Blood Glucose.: 141 mg/dL (11 Dec 2019 21:15)  POCT Blood Glucose.: 91 mg/dL (11 Dec 2019 17:31)  POCT Blood Glucose.: 72 mg/dL (11 Dec 2019 12:31)              RADIOLOGY & ADDITIONAL STUDIES:

## 2019-12-12 NOTE — PROGRESS NOTE ADULT - ASSESSMENT
Assessment  DMT2: 59y Male with DM T2 with hyperglycemia, A1C 10.4%, on large dose insulin, blood sugars trending down off steroid management, decreased insulin dose yesterday, patient had hypoglycemic episodes this AM. Patient is eating full meals, appears comfortable, planning DC to rehab.  COPD Exacerbation: on medications and nebulizer treatment, stable, monitored.  HTN: Controlled,  on antihypertensive medications.  HLD: Controlled, on statin.  Schizophrenia: On meds.  Obesity: No strict exercise routines, not on any weight loss program, neither on low calorie diet.          Laury Romero MD  Cell: [phone]  Office: [phone] Assessment  DMT2: 59y Male with DM T2 with hyperglycemia, A1C 10.4%, on large dose insulin, blood sugars trending down off steroid management, decreased insulin dose yesterday, patient had hypoglycemic episodes this AM. Patient is eating  full meals, appears comfortable, planning DC to rehab.  COPD Exacerbation: on medications and nebulizer treatment, stable, monitored.  HTN: Controlled,  on antihypertensive medications.  HLD: Controlled, on statin.  Schizophrenia: On meds.  Obesity: No strict exercise routines, not on any weight loss program, neither on low calorie diet.          Laury Romero MD  Cell: [phone]  Office: [phone]

## 2019-12-12 NOTE — PROGRESS NOTE ADULT - PROBLEM SELECTOR PROBLEM 5
Gastroesophageal reflux disease, esophagitis presence not specified
Obesity (BMI 30-39.9)
Paranoid schizophrenia
Stented coronary artery
Stented coronary artery

## 2019-12-12 NOTE — CHART NOTE - NSCHARTNOTEFT_GEN_A_CORE
Notified by RN, pt hypoglycemic. Blood glucose found to be 51, and subsequently 47. Pt seen a bedside lethargic but easily arousable, reports decrease nausea. Denies palpitations, anxiety, headache, change in vision, or confusion.    T(C): 36.7 (12-12-19 @ 05:24), Max: 36.8 (12-11-19 @ 11:46)  HR: 95 (12-12-19 @ 05:24) (95 - 114)  BP: 144/68 (12-12-19 @ 05:24) (129/74 - 144/68)  RR: 19 (12-12-19 @ 05:24) (18 - 21)  SpO2: 95% (12-12-19 @ 05:24) (90% - 95%)      -Follow hypoglycemic protocol  -Hold Lantus and pre-meal insulin  at this time   -Encourage adequate PO intake  -Endocrine called to adjust insulins   -Continue close monitoring     She Chen  [id number]

## 2019-12-12 NOTE — PROGRESS NOTE ADULT - SUBJECTIVE AND OBJECTIVE BOX
[facility]   MD Chelsea Burroughs D.O. Fatima Sheikh, D.O. Ruoru Wong, PA    From 7 AM - 5 PM:  OFFICE: [phone]  Dr. Juarez cell: [phone]  Dr. Daniel cell: [phone]  Dr. Kwong cell: [phone]  ALVARO Spencer cell: [phone]    From 5 PM - 7 AM: Answering Service: [phone]        RENAL FOLLOW UP NOTE  --------------------------------------------------------------------------------  HPI: Pt seen and examined. States he feels well and has no complaints today. Denies any SOB, cough, N/V, abd pain, urinary symptoms. Awaiting placement.        PAST HISTORY  --------------------------------------------------------------------------------  No significant changes to PMH, PSH, FHx, SHx, unless otherwise noted    ALLERGIES & MEDICATIONS  --------------------------------------------------------------------------------  Allergies    No Known Allergies    Intolerances      Standing Inpatient Medications  albuterol/ipratropium for Nebulization 3 milliLiter(s) Nebulizer every 6 hours  aspirin enteric coated 81 milliGRAM(s) Oral daily  atorvastatin 40 milliGRAM(s) Oral at bedtime  budesonide 160 MICROgram(s)/formoterol 4.5 MICROgram(s) Inhaler 2 Puff(s) Inhalation two times a day  clopidogrel Tablet 75 milliGRAM(s) Oral daily  dextrose 5%. 1000 milliLiter(s) IV Continuous <Continuous>  dextrose 50% Injectable 12.5 Gram(s) IV Push once  dextrose 50% Injectable 25 Gram(s) IV Push once  dextrose 50% Injectable 25 Gram(s) IV Push once  enoxaparin Injectable 40 milliGRAM(s) SubCutaneous daily  famotidine    Tablet 20 milliGRAM(s) Oral daily  furosemide    Tablet 20 milliGRAM(s) Oral daily  insulin glargine Injectable (LANTUS) 60 Unit(s) SubCutaneous at bedtime  insulin glargine Injectable (LANTUS) 60 Unit(s) SubCutaneous every morning  insulin lispro (HumaLOG) corrective regimen sliding scale   SubCutaneous at bedtime  insulin lispro (HumaLOG) corrective regimen sliding scale   SubCutaneous three times a day before meals  insulin lispro Injectable (HumaLOG) 10 Unit(s) SubCutaneous three times a day with meals  melatonin 5 milliGRAM(s) Oral at bedtime  polyethylene glycol 3350 17 Gram(s) Oral daily  predniSONE   Tablet 30 milliGRAM(s) Oral daily  risperiDONE   Tablet 0.5 milliGRAM(s) Oral three times a day  senna 2 Tablet(s) Oral at bedtime    PRN Inpatient Medications  ALPRAZolam 1 milliGRAM(s) Oral every 8 hours PRN  dextrose 40% Gel 15 Gram(s) Oral once PRN  glucagon  Injectable 1 milliGRAM(s) IntraMuscular once PRN  ibuprofen  Tablet. 800 milliGRAM(s) Oral three times a day PRN  lidocaine   Patch 1 Patch Transdermal daily PRN  oxycodone    5 mG/acetaminophen 325 mG 2 Tablet(s) Oral every 6 hours PRN      REVIEW OF SYSTEMS  --------------------------------------------------------------------------------  General: no fever  CVS: no chest pain  RESP: no sob, no cough  ABD: no abdominal pain  : no dysuria,  MSK: no edema     VITALS/PHYSICAL EXAM  --------------------------------------------------------------------------------  T(C): 36.7 (12-12-19 @ 11:36), Max: 36.7 (12-12-19 @ 05:24)  HR: 109 (12-12-19 @ 11:36) (95 - 114)  BP: 114/66 (12-12-19 @ 11:36) (114/66 - 144/68)  RR: 19 (12-12-19 @ 11:36) (19 - 21)  SpO2: 98% (12-12-19 @ 11:36) (90% - 98%)  Wt(kg): --        12-11-19 @ 07:01  -  12-12-19 @ 07:00  --------------------------------------------------------  IN: 480 mL / OUT: 4 mL / NET: 476 mL    12-12-19 @ 07:01  -  12-12-19 @ 14:08  --------------------------------------------------------  IN: 480 mL / OUT: 0 mL / NET: 480 mL      Physical Exam:  	Gen: NAD  	HEENT: MMM  	Pulm: B/L wheezes noted  	CV: S1S2  	Abd: Soft, +BS  	Ext: 1+ LE edema B/L              Neuro: Awake   	Skin: Warm and Dry   	    LABS/STUDIES  --------------------------------------------------------------------------------                Creatinine Trend:  SCr 0.62 [12-04 @ 10:58]  SCr 0.65 [12-03 @ 12:37]  SCr 0.63 [12-03 @ 09:37]  SCr 0.73 [12-03 @ 02:00]  SCr 0.86 [12-02 @ 23:50]        HbA1c 10.4      [12-04-19 @ 08:53]

## 2019-12-12 NOTE — PROGRESS NOTE ADULT - ASSESSMENT
59 year-old male with history of COPD on 2L NC @ home, mod pulmonary HTN, HFpEF (EF 75% on 10/2018 TTE), CAD and stents, IDDM2, paranoid schizophrenia, anxiety p/w worsening SOB and cough for 1 week from long term NH.  He has been there for the past 8 months and states that he was not getting his nebs ATC and sometimes even PRN, thinks that exacerbated his SOB/COPD. Nephrology consulted for electrolyte abnormality.    Hyponatremia  - in the setting of elevated glucose  - elevated serum osm likely from high glucose  - optimized DM control     - repeat BMP     Hyperkalemia   - in the setting of elevated glucose, s/p lokelma   - optimized DM control     HTN  BP acceptable    LE edema  - has Hx HFpEF w/ severe RV systolic dysfunction and mod pHTN  - recommend lasix 20 mg PO daily for diuresis  - repeat BMP after starting diuretic  - monitor I/O's    - check UA to r/o proteinuria/nephrotic syndrome as possible etiology for edema (I took the liberty of ordering UA)  - consider cardio f/u for Hx HFpEF  - remainder of w/u per primary

## 2019-12-12 NOTE — PROGRESS NOTE ADULT - PROBLEM SELECTOR PLAN 1
Will decrease Lantus to 60u BID.  Will continue Humalog 10u before each meal as well as Humalog correction scale coverage.  Will continue monitoring FS and FU.  Patient was on insulin regimen at rehab, A1C 10.4%. Suggest to DC to rehab on the above insulin regimen and FU endo 4 weeks.  Patient counseled for compliance with consistent low carb diet and exercise as tolerated outpatient. Will decrease Lantus to 60u BID.  Will continue Humalog 10u before each meal as well as Humalog correction scale coverage.  Will continue monitoring FS and FU.  Patient was on insulin regimen at rehab, A1C 10.4%.   Patient counseled for compliance with consistent low carb diet and exercise as tolerated outpatient.

## 2019-12-12 NOTE — PROGRESS NOTE ADULT - REASON FOR ADMISSION
SOB

## 2019-12-12 NOTE — PROGRESS NOTE ADULT - PROBLEM SELECTOR PROBLEM 7
Prophylactic measure
Prophylactic measure
Gastroesophageal reflux disease, esophagitis presence not specified
Gastroesophageal reflux disease, esophagitis presence not specified
Prophylactic measure
Smoker

## 2019-12-12 NOTE — PROGRESS NOTE ADULT - PROBLEM SELECTOR PLAN 6
adv strongly to stop smoking
adv strongly to stop smoking
Risperidone tid
Risperidone tid
adv strongly to stop smoking
adv strongly to stop smoking  12/7 nicotine patch
adv strongly to stop smoking  12/7 nicotine patch  12/8 nicotine patch, smoking cessation education
pepcid

## 2019-12-12 NOTE — PROGRESS NOTE ADULT - ATTENDING COMMENTS
Will increase Lantus to 34u at bedtime, increase Humalog to 12u before each meal, and continue Humalog correction scale coverage. Will continue monitoring FS, log, and FU.
DC to rehab on the above insulin regimen and FU endo 4 weeks.
DVT prophyalxis
DVT prophyalxis  12/5: Vanna perez
Steroid has been d/c; Expect blood sugars improvement..
Will continue current insulin regimen for now. Will continue monitoring FS, log, and FU.
Will continue current insulin regimen, continue monitoring FS and FU.
Will decrease Lantus to 70u BID, decrease Humalog to 10u before each meal, and continue coverage scale. Will continue monitoring FS and FU.
Will increase basal insulin to Lantus 80u AM, 75u PM.  Will increase Humalog to 36u before each meal and continue Humalog correction scale coverage.
DVT prophyalxis  12/5: Vanna perez  12/6: Seesm to be getting better
doing mucbetter: dc planning: cont steroids  12/9: stable from pulm point of view: dc planning
doing mucbetter: dc planning: cont steroids  12/9: stable from pulm point of view: dc planning  12/10: seems stable: on oxygen
doing mucbetter: dc planning: cont steroids  12/9: stable from pulm point of view: dc planning  12/10: seems stable: on oxygen  12/11: stable:
doing mucbetter: dc planning: cont steroids
he seems stable: wants to be dced: some cough with phlegm: but OK:
chronic lower back pain - analgesics prn

## 2019-12-12 NOTE — PROGRESS NOTE ADULT - PROBLEM SELECTOR PLAN 5
PPI
PPI
ASA, Plavix, statin
ASA, Plavix, statin
Overweight/Obesity: Patient counseled for weight loss, exercise, low calorie diet.
PPI
PPI  12/7 PPI
PPI  12/7 PPI  12/8 on PPI
Risperidone tid

## 2019-12-12 NOTE — PROGRESS NOTE ADULT - PROBLEM SELECTOR PROBLEM 6
Smoker
Smoker
Gastroesophageal reflux disease, esophagitis presence not specified
Paranoid schizophrenia
Paranoid schizophrenia
Smoker

## 2019-12-31 NOTE — H&P ADULT - NSHPLANGLIMITEDENGLISH_GEN_A_CORE
Noted.
Rcv'd fax request from [facility] for a refill of  Quetiapine Fumarate 25mg      LOV was 5/14/19  And Pending is 2/5/20        This med is listed from Historical Provider in her med list with a date of 12/5/19.
This was ordered on 12/9 per the record
No

## 2020-01-01 RX ADMIN — OXYCODONE AND ACETAMINOPHEN 2 TABLET(S): 5; 325 TABLET ORAL at 02:00

## 2020-01-03 ENCOUNTER — INPATIENT (INPATIENT)
Facility: HOSPITAL | Age: 60
LOS: 69 days | Discharge: HOME | DRG: 637 | End: 2020-03-13
Attending: INTERNAL MEDICINE | Admitting: INTERNAL MEDICINE
Payer: COMMERCIAL

## 2020-01-03 VITALS
RESPIRATION RATE: 25 BRPM | HEART RATE: 130 BPM | DIASTOLIC BLOOD PRESSURE: 75 MMHG | WEIGHT: 231.04 LBS | SYSTOLIC BLOOD PRESSURE: 116 MMHG | OXYGEN SATURATION: 97 % | TEMPERATURE: 98 F | HEIGHT: 68 IN

## 2020-01-03 LAB
ALBUMIN SERPL ELPH-MCNC: 4.3 G/DL — SIGNIFICANT CHANGE UP (ref 3.3–5)
ALP SERPL-CCNC: 74 U/L — SIGNIFICANT CHANGE UP (ref 40–120)
ALT FLD-CCNC: 20 U/L — SIGNIFICANT CHANGE UP (ref 10–45)
ANION GAP SERPL CALC-SCNC: 15 MMOL/L — SIGNIFICANT CHANGE UP (ref 5–17)
ANION GAP SERPL CALC-SCNC: 18 MMOL/L — HIGH (ref 5–17)
APPEARANCE UR: CLEAR — SIGNIFICANT CHANGE UP
APTT BLD: 28.5 SEC — SIGNIFICANT CHANGE UP (ref 27.5–36.3)
AST SERPL-CCNC: 16 U/L — SIGNIFICANT CHANGE UP (ref 10–40)
B-OH-BUTYR SERPL-SCNC: 0.8 MMOL/L — HIGH
BASOPHILS # BLD AUTO: 0 K/UL — SIGNIFICANT CHANGE UP (ref 0–0.2)
BASOPHILS NFR BLD AUTO: 0 % — SIGNIFICANT CHANGE UP (ref 0–2)
BILIRUB SERPL-MCNC: 0.6 MG/DL — SIGNIFICANT CHANGE UP (ref 0.2–1.2)
BILIRUB UR-MCNC: NEGATIVE — SIGNIFICANT CHANGE UP
BUN SERPL-MCNC: 22 MG/DL — SIGNIFICANT CHANGE UP (ref 7–23)
BUN SERPL-MCNC: 30 MG/DL — HIGH (ref 7–23)
CALCIUM SERPL-MCNC: 8.9 MG/DL — SIGNIFICANT CHANGE UP (ref 8.4–10.5)
CALCIUM SERPL-MCNC: 8.9 MG/DL — SIGNIFICANT CHANGE UP (ref 8.4–10.5)
CHLORIDE SERPL-SCNC: 89 MMOL/L — LOW (ref 96–108)
CHLORIDE SERPL-SCNC: 89 MMOL/L — LOW (ref 96–108)
CO2 SERPL-SCNC: 22 MMOL/L — SIGNIFICANT CHANGE UP (ref 22–31)
CO2 SERPL-SCNC: 25 MMOL/L — SIGNIFICANT CHANGE UP (ref 22–31)
COLOR SPEC: SIGNIFICANT CHANGE UP
CREAT SERPL-MCNC: 0.7 MG/DL — SIGNIFICANT CHANGE UP (ref 0.5–1.3)
CREAT SERPL-MCNC: 0.81 MG/DL — SIGNIFICANT CHANGE UP (ref 0.5–1.3)
DIFF PNL FLD: ABNORMAL
EOSINOPHIL # BLD AUTO: 0 K/UL — SIGNIFICANT CHANGE UP (ref 0–0.5)
EOSINOPHIL NFR BLD AUTO: 0 % — SIGNIFICANT CHANGE UP (ref 0–6)
FLU A RESULT: SIGNIFICANT CHANGE UP
FLU A RESULT: SIGNIFICANT CHANGE UP
FLUAV AG NPH QL: SIGNIFICANT CHANGE UP
FLUBV AG NPH QL: SIGNIFICANT CHANGE UP
GAS PNL BLDV: SIGNIFICANT CHANGE UP
GLUCOSE SERPL-MCNC: 452 MG/DL — CRITICAL HIGH (ref 70–99)
GLUCOSE SERPL-MCNC: 631 MG/DL — CRITICAL HIGH (ref 70–99)
GLUCOSE UR QL: ABNORMAL
HCT VFR BLD CALC: 46.1 % — SIGNIFICANT CHANGE UP (ref 39–50)
HGB BLD-MCNC: 14.4 G/DL — SIGNIFICANT CHANGE UP (ref 13–17)
INR BLD: 1.03 RATIO — SIGNIFICANT CHANGE UP (ref 0.88–1.16)
KETONES UR-MCNC: ABNORMAL
LEUKOCYTE ESTERASE UR-ACNC: NEGATIVE — SIGNIFICANT CHANGE UP
LYMPHOCYTES # BLD AUTO: 0.14 K/UL — LOW (ref 1–3.3)
LYMPHOCYTES # BLD AUTO: 1 % — LOW (ref 13–44)
MCHC RBC-ENTMCNC: 28.2 PG — SIGNIFICANT CHANGE UP (ref 27–34)
MCHC RBC-ENTMCNC: 31.2 GM/DL — LOW (ref 32–36)
MCV RBC AUTO: 90.4 FL — SIGNIFICANT CHANGE UP (ref 80–100)
MONOCYTES # BLD AUTO: 0.28 K/UL — SIGNIFICANT CHANGE UP (ref 0–0.9)
MONOCYTES NFR BLD AUTO: 2 % — SIGNIFICANT CHANGE UP (ref 2–14)
NEUTROPHILS # BLD AUTO: 12.93 K/UL — HIGH (ref 1.8–7.4)
NEUTROPHILS NFR BLD AUTO: 76 % — SIGNIFICANT CHANGE UP (ref 43–77)
NITRITE UR-MCNC: NEGATIVE — SIGNIFICANT CHANGE UP
PH UR: 6 — SIGNIFICANT CHANGE UP (ref 5–8)
PLATELET # BLD AUTO: 196 K/UL — SIGNIFICANT CHANGE UP (ref 150–400)
POTASSIUM SERPL-MCNC: 4.6 MMOL/L — SIGNIFICANT CHANGE UP (ref 3.5–5.3)
POTASSIUM SERPL-MCNC: 5.9 MMOL/L — HIGH (ref 3.5–5.3)
POTASSIUM SERPL-SCNC: 4.6 MMOL/L — SIGNIFICANT CHANGE UP (ref 3.5–5.3)
POTASSIUM SERPL-SCNC: 5.9 MMOL/L — HIGH (ref 3.5–5.3)
PROT SERPL-MCNC: 7.4 G/DL — SIGNIFICANT CHANGE UP (ref 6–8.3)
PROT UR-MCNC: NEGATIVE — SIGNIFICANT CHANGE UP
PROTHROM AB SERPL-ACNC: 11.8 SEC — SIGNIFICANT CHANGE UP (ref 10–12.9)
RBC # BLD: 5.1 M/UL — SIGNIFICANT CHANGE UP (ref 4.2–5.8)
RBC # FLD: 12.6 % — SIGNIFICANT CHANGE UP (ref 10.3–14.5)
RSV RESULT: SIGNIFICANT CHANGE UP
RSV RNA RESP QL NAA+PROBE: SIGNIFICANT CHANGE UP
SODIUM SERPL-SCNC: 126 MMOL/L — LOW (ref 135–145)
SODIUM SERPL-SCNC: 132 MMOL/L — LOW (ref 135–145)
SP GR SPEC: 1.03 — HIGH (ref 1.01–1.02)
UROBILINOGEN FLD QL: NEGATIVE — SIGNIFICANT CHANGE UP
WBC # BLD: 13.9 K/UL — HIGH (ref 3.8–10.5)
WBC # FLD AUTO: 13.9 K/UL — HIGH (ref 3.8–10.5)

## 2020-01-03 PROCEDURE — 99291 CRITICAL CARE FIRST HOUR: CPT

## 2020-01-03 PROCEDURE — 71045 X-RAY EXAM CHEST 1 VIEW: CPT | Mod: 26

## 2020-01-03 PROCEDURE — 93010 ELECTROCARDIOGRAM REPORT: CPT

## 2020-01-03 PROCEDURE — 74177 CT ABD & PELVIS W/CONTRAST: CPT | Mod: 26

## 2020-01-03 RX ORDER — SODIUM CHLORIDE 9 MG/ML
1000 INJECTION, SOLUTION INTRAVENOUS ONCE
Refills: 0 | Status: COMPLETED | OUTPATIENT
Start: 2020-01-03 | End: 2020-01-03

## 2020-01-03 RX ORDER — PIPERACILLIN AND TAZOBACTAM 4; .5 G/20ML; G/20ML
3.38 INJECTION, POWDER, LYOPHILIZED, FOR SOLUTION INTRAVENOUS ONCE
Refills: 0 | Status: COMPLETED | OUTPATIENT
Start: 2020-01-03 | End: 2020-01-03

## 2020-01-03 RX ORDER — CALCIUM GLUCONATE 100 MG/ML
2 VIAL (ML) INTRAVENOUS ONCE
Refills: 0 | Status: COMPLETED | OUTPATIENT
Start: 2020-01-03 | End: 2020-01-03

## 2020-01-03 RX ORDER — INSULIN GLARGINE 100 [IU]/ML
60 INJECTION, SOLUTION SUBCUTANEOUS ONCE
Refills: 0 | Status: DISCONTINUED | OUTPATIENT
Start: 2020-01-03 | End: 2020-01-03

## 2020-01-03 RX ORDER — ONDANSETRON 8 MG/1
4 TABLET, FILM COATED ORAL ONCE
Refills: 0 | Status: COMPLETED | OUTPATIENT
Start: 2020-01-03 | End: 2020-01-03

## 2020-01-03 RX ORDER — INSULIN LISPRO 100/ML
10 VIAL (ML) SUBCUTANEOUS ONCE
Refills: 0 | Status: COMPLETED | OUTPATIENT
Start: 2020-01-03 | End: 2020-01-03

## 2020-01-03 RX ORDER — IPRATROPIUM/ALBUTEROL SULFATE 18-103MCG
3 AEROSOL WITH ADAPTER (GRAM) INHALATION ONCE
Refills: 0 | Status: COMPLETED | OUTPATIENT
Start: 2020-01-03 | End: 2020-01-03

## 2020-01-03 RX ORDER — INSULIN HUMAN 100 [IU]/ML
6 INJECTION, SOLUTION SUBCUTANEOUS ONCE
Refills: 0 | Status: COMPLETED | OUTPATIENT
Start: 2020-01-03 | End: 2020-01-03

## 2020-01-03 RX ORDER — INSULIN GLARGINE 100 [IU]/ML
60 INJECTION, SOLUTION SUBCUTANEOUS ONCE
Refills: 0 | Status: COMPLETED | OUTPATIENT
Start: 2020-01-03 | End: 2020-01-03

## 2020-01-03 RX ORDER — INSULIN HUMAN 100 [IU]/ML
10 INJECTION, SOLUTION SUBCUTANEOUS ONCE
Refills: 0 | Status: DISCONTINUED | OUTPATIENT
Start: 2020-01-03 | End: 2020-01-03

## 2020-01-03 RX ADMIN — INSULIN GLARGINE 60 UNIT(S): 100 INJECTION, SOLUTION SUBCUTANEOUS at 15:43

## 2020-01-03 RX ADMIN — SODIUM CHLORIDE 2000 MILLILITER(S): 9 INJECTION, SOLUTION INTRAVENOUS at 14:44

## 2020-01-03 RX ADMIN — Medication 3 MILLILITER(S): at 13:46

## 2020-01-03 RX ADMIN — Medication 3 MILLILITER(S): at 13:45

## 2020-01-03 RX ADMIN — SODIUM CHLORIDE 1000 MILLILITER(S): 9 INJECTION, SOLUTION INTRAVENOUS at 19:21

## 2020-01-03 RX ADMIN — INSULIN HUMAN 6 UNIT(S): 100 INJECTION, SOLUTION SUBCUTANEOUS at 15:46

## 2020-01-03 RX ADMIN — ONDANSETRON 4 MILLIGRAM(S): 8 TABLET, FILM COATED ORAL at 13:46

## 2020-01-03 RX ADMIN — PIPERACILLIN AND TAZOBACTAM 200 GRAM(S): 4; .5 INJECTION, POWDER, LYOPHILIZED, FOR SOLUTION INTRAVENOUS at 16:22

## 2020-01-03 RX ADMIN — SODIUM CHLORIDE 1000 MILLILITER(S): 9 INJECTION, SOLUTION INTRAVENOUS at 13:46

## 2020-01-03 RX ADMIN — Medication 10 UNIT(S): at 14:40

## 2020-01-03 RX ADMIN — Medication 200 GRAM(S): at 15:45

## 2020-01-03 NOTE — ED PROVIDER NOTE - CLINICAL SUMMARY MEDICAL DECISION MAKING FREE TEXT BOX
60 yo male with DM CAD COPD presenting with N/V/D today with hyperglycemia. will evaluate for DKA vs intrabdominal pathology with cbc cmp trop, vbg, UA, ct abd pelvis. will give ivf, insulin, and will reassess. likely admission for hyperglycemia vs metabolic derangements. will consider bipap due to increased WOB

## 2020-01-03 NOTE — ED ADULT NURSE NOTE - OBJECTIVE STATEMENT
60 yo M aaox4 c/o of NVD and 's at assisted living. Onset of symptoms two days ago. Pt reports more than 5 episodes of diarrhea. Denies bloody stools. + Polyuria. FS done here in ED. EKG done. Pt placed on CM. IV line placed labs drawn and sent. Medication given as ordered. Imaging studies ordered. Provider at bedside,

## 2020-01-03 NOTE — ED ADULT NURSE NOTE - NSIMPLEMENTINTERV_GEN_ALL_ED
Implemented All Fall Risk Interventions:  [place] to call system. Call bell, personal items and telephone within reach. Instruct patient to call for assistance. Room bathroom lighting operational. Non-slip footwear when patient is off stretcher. Physically safe environment: no spills, clutter or unnecessary equipment. Stretcher in lowest position, wheels locked, appropriate side rails in place. Provide visual cue, wrist band, yellow gown, etc. Monitor gait and stability. Monitor for mental status changes and reorient to person, place, and time. Review medications for side effects contributing to fall risk. Reinforce activity limits and safety measures with patient and family.

## 2020-01-03 NOTE — ED PROVIDER NOTE - OBJECTIVE STATEMENT
60 yo male with pmhx of DM 60 yo male with pmhx of DM, COPD, CAF, presenting with N/V today. Patient woke up this morning having multiple NBNB episodes of vomiting and episodes of diarrhea. Also endorsing decreasing PO. Came from SNF. Also endorsing feeling lightheaded and weak lately. FS was found to be >500 so came to ED for further evaluation. Patient did not take insulin this morning. Patient never admitted for symptoms in past.    Denies CP, SOB, abd pain, LOC, fevers, recent travel, sick contacts.

## 2020-01-03 NOTE — ED PROVIDER NOTE - ATTENDING CONTRIBUTION TO CARE
Attending MD Lim:  I personally have seen and examined this patient.  Resident note reviewed and agree on plan of care and except where noted.  See HPI, PE, and MDM for details.      59M with ho COPD, DM presenting from nursing facility with hyperglycemia, nausea/vomiting diarrhea and increased wob. On exam patient in moderate respiratory distress, distended abdomen, hyperglycemic. DDx includes DKA, concurrent COPD exacerbation, sepsis. Plan for bipap, nebs, labs, IV fluids, CT a/p given abd distention

## 2020-01-04 DIAGNOSIS — J96.21 ACUTE AND CHRONIC RESPIRATORY FAILURE WITH HYPOXIA: ICD-10-CM

## 2020-01-04 DIAGNOSIS — Z02.9 ENCOUNTER FOR ADMINISTRATIVE EXAMINATIONS, UNSPECIFIED: ICD-10-CM

## 2020-01-04 DIAGNOSIS — E78.5 HYPERLIPIDEMIA, UNSPECIFIED: ICD-10-CM

## 2020-01-04 DIAGNOSIS — R73.9 HYPERGLYCEMIA, UNSPECIFIED: ICD-10-CM

## 2020-01-04 DIAGNOSIS — I50.32 CHRONIC DIASTOLIC (CONGESTIVE) HEART FAILURE: ICD-10-CM

## 2020-01-04 DIAGNOSIS — E11.10 TYPE 2 DIABETES MELLITUS WITH KETOACIDOSIS WITHOUT COMA: ICD-10-CM

## 2020-01-04 DIAGNOSIS — A41.9 SEPSIS, UNSPECIFIED ORGANISM: ICD-10-CM

## 2020-01-04 DIAGNOSIS — R16.1 SPLENOMEGALY, NOT ELSEWHERE CLASSIFIED: ICD-10-CM

## 2020-01-04 DIAGNOSIS — K52.9 NONINFECTIVE GASTROENTERITIS AND COLITIS, UNSPECIFIED: ICD-10-CM

## 2020-01-04 DIAGNOSIS — I25.10 ATHEROSCLEROTIC HEART DISEASE OF NATIVE CORONARY ARTERY WITHOUT ANGINA PECTORIS: ICD-10-CM

## 2020-01-04 DIAGNOSIS — E66.9 OBESITY, UNSPECIFIED: ICD-10-CM

## 2020-01-04 DIAGNOSIS — R33.9 RETENTION OF URINE, UNSPECIFIED: ICD-10-CM

## 2020-01-04 DIAGNOSIS — F99 MENTAL DISORDER, NOT OTHERWISE SPECIFIED: ICD-10-CM

## 2020-01-04 DIAGNOSIS — E11.9 TYPE 2 DIABETES MELLITUS WITHOUT COMPLICATIONS: ICD-10-CM

## 2020-01-04 LAB
ALBUMIN SERPL ELPH-MCNC: 3.4 G/DL — SIGNIFICANT CHANGE UP (ref 3.3–5)
ALP SERPL-CCNC: 53 U/L — SIGNIFICANT CHANGE UP (ref 40–120)
ALT FLD-CCNC: 28 U/L — SIGNIFICANT CHANGE UP (ref 10–45)
ANION GAP SERPL CALC-SCNC: 9 MMOL/L — SIGNIFICANT CHANGE UP (ref 5–17)
APTT BLD: 26.1 SEC — LOW (ref 27.5–36.3)
AST SERPL-CCNC: 21 U/L — SIGNIFICANT CHANGE UP (ref 10–40)
B-OH-BUTYR SERPL-SCNC: 0.2 MMOL/L — SIGNIFICANT CHANGE UP
BASE EXCESS BLDV CALC-SCNC: 6 MMOL/L — HIGH (ref -2–2)
BASOPHILS # BLD AUTO: 0.01 K/UL — SIGNIFICANT CHANGE UP (ref 0–0.2)
BASOPHILS NFR BLD AUTO: 0.1 % — SIGNIFICANT CHANGE UP (ref 0–2)
BILIRUB SERPL-MCNC: 0.3 MG/DL — SIGNIFICANT CHANGE UP (ref 0.2–1.2)
BLD GP AB SCN SERPL QL: NEGATIVE — SIGNIFICANT CHANGE UP
BUN SERPL-MCNC: 14 MG/DL — SIGNIFICANT CHANGE UP (ref 7–23)
CA-I SERPL-SCNC: 1.15 MMOL/L — SIGNIFICANT CHANGE UP (ref 1.12–1.3)
CALCIUM SERPL-MCNC: 8.5 MG/DL — SIGNIFICANT CHANGE UP (ref 8.4–10.5)
CHLORIDE BLDV-SCNC: 96 MMOL/L — SIGNIFICANT CHANGE UP (ref 96–108)
CHLORIDE SERPL-SCNC: 93 MMOL/L — LOW (ref 96–108)
CO2 BLDV-SCNC: 34 MMOL/L — HIGH (ref 22–30)
CO2 SERPL-SCNC: 27 MMOL/L — SIGNIFICANT CHANGE UP (ref 22–31)
CREAT SERPL-MCNC: 0.61 MG/DL — SIGNIFICANT CHANGE UP (ref 0.5–1.3)
CULTURE RESULTS: NO GROWTH — SIGNIFICANT CHANGE UP
EOSINOPHIL # BLD AUTO: 0.06 K/UL — SIGNIFICANT CHANGE UP (ref 0–0.5)
EOSINOPHIL NFR BLD AUTO: 0.8 % — SIGNIFICANT CHANGE UP (ref 0–6)
GAS PNL BLDV: 130 MMOL/L — LOW (ref 135–145)
GAS PNL BLDV: SIGNIFICANT CHANGE UP
GLUCOSE BLDV-MCNC: 308 MG/DL — HIGH (ref 70–99)
GLUCOSE SERPL-MCNC: 301 MG/DL — HIGH (ref 70–99)
HCO3 BLDV-SCNC: 32 MMOL/L — HIGH (ref 21–29)
HCT VFR BLD CALC: 36.6 % — LOW (ref 39–50)
HCT VFR BLD CALC: 36.8 % — LOW (ref 39–50)
HCT VFR BLDA CALC: 39 % — SIGNIFICANT CHANGE UP (ref 39–50)
HGB BLD CALC-MCNC: 12.6 G/DL — LOW (ref 13–17)
HGB BLD-MCNC: 11.7 G/DL — LOW (ref 13–17)
HGB BLD-MCNC: 11.9 G/DL — LOW (ref 13–17)
HOROWITZ INDEX BLDV+IHG-RTO: SIGNIFICANT CHANGE UP
IMM GRANULOCYTES NFR BLD AUTO: 0.6 % — SIGNIFICANT CHANGE UP (ref 0–1.5)
INR BLD: 1.08 RATIO — SIGNIFICANT CHANGE UP (ref 0.88–1.16)
LACTATE BLDV-MCNC: 1.8 MMOL/L — SIGNIFICANT CHANGE UP (ref 0.7–2)
LYMPHOCYTES # BLD AUTO: 0.8 K/UL — LOW (ref 1–3.3)
LYMPHOCYTES # BLD AUTO: 10.1 % — LOW (ref 13–44)
MAGNESIUM SERPL-MCNC: 1.7 MG/DL — SIGNIFICANT CHANGE UP (ref 1.6–2.6)
MCHC RBC-ENTMCNC: 28.4 PG — SIGNIFICANT CHANGE UP (ref 27–34)
MCHC RBC-ENTMCNC: 29 PG — SIGNIFICANT CHANGE UP (ref 27–34)
MCHC RBC-ENTMCNC: 31.8 GM/DL — LOW (ref 32–36)
MCHC RBC-ENTMCNC: 32.5 GM/DL — SIGNIFICANT CHANGE UP (ref 32–36)
MCV RBC AUTO: 89.3 FL — SIGNIFICANT CHANGE UP (ref 80–100)
MCV RBC AUTO: 89.3 FL — SIGNIFICANT CHANGE UP (ref 80–100)
MONOCYTES # BLD AUTO: 0.68 K/UL — SIGNIFICANT CHANGE UP (ref 0–0.9)
MONOCYTES NFR BLD AUTO: 8.6 % — SIGNIFICANT CHANGE UP (ref 2–14)
NEUTROPHILS # BLD AUTO: 6.31 K/UL — SIGNIFICANT CHANGE UP (ref 1.8–7.4)
NEUTROPHILS NFR BLD AUTO: 79.8 % — HIGH (ref 43–77)
NRBC # BLD: 0 /100 WBCS — SIGNIFICANT CHANGE UP (ref 0–0)
NRBC # BLD: 0 /100 WBCS — SIGNIFICANT CHANGE UP (ref 0–0)
PCO2 BLDV: 58 MMHG — HIGH (ref 35–50)
PH BLDV: 7.36 — SIGNIFICANT CHANGE UP (ref 7.35–7.45)
PHOSPHATE SERPL-MCNC: 2.5 MG/DL — SIGNIFICANT CHANGE UP (ref 2.5–4.5)
PLATELET # BLD AUTO: 157 K/UL — SIGNIFICANT CHANGE UP (ref 150–400)
PLATELET # BLD AUTO: 160 K/UL — SIGNIFICANT CHANGE UP (ref 150–400)
PO2 BLDV: 56 MMHG — HIGH (ref 25–45)
POTASSIUM BLDV-SCNC: 4.7 MMOL/L — SIGNIFICANT CHANGE UP (ref 3.5–5.3)
POTASSIUM SERPL-MCNC: 3.9 MMOL/L — SIGNIFICANT CHANGE UP (ref 3.5–5.3)
POTASSIUM SERPL-SCNC: 3.9 MMOL/L — SIGNIFICANT CHANGE UP (ref 3.5–5.3)
PROT SERPL-MCNC: 5.7 G/DL — LOW (ref 6–8.3)
PROTHROM AB SERPL-ACNC: 12.4 SEC — SIGNIFICANT CHANGE UP (ref 10–12.9)
RBC # BLD: 4.1 M/UL — LOW (ref 4.2–5.8)
RBC # BLD: 4.12 M/UL — LOW (ref 4.2–5.8)
RBC # FLD: 12.8 % — SIGNIFICANT CHANGE UP (ref 10.3–14.5)
RBC # FLD: 12.8 % — SIGNIFICANT CHANGE UP (ref 10.3–14.5)
RH IG SCN BLD-IMP: POSITIVE — SIGNIFICANT CHANGE UP
SAO2 % BLDV: 88 % — SIGNIFICANT CHANGE UP (ref 67–88)
SODIUM SERPL-SCNC: 129 MMOL/L — LOW (ref 135–145)
SODIUM UR-SCNC: <35 MMOL/L — SIGNIFICANT CHANGE UP
SPECIMEN SOURCE: SIGNIFICANT CHANGE UP
WBC # BLD: 7.68 K/UL — SIGNIFICANT CHANGE UP (ref 3.8–10.5)
WBC # BLD: 7.91 K/UL — SIGNIFICANT CHANGE UP (ref 3.8–10.5)
WBC # FLD AUTO: 7.68 K/UL — SIGNIFICANT CHANGE UP (ref 3.8–10.5)
WBC # FLD AUTO: 7.91 K/UL — SIGNIFICANT CHANGE UP (ref 3.8–10.5)

## 2020-01-04 PROCEDURE — 99223 1ST HOSP IP/OBS HIGH 75: CPT

## 2020-01-04 PROCEDURE — 71250 CT THORAX DX C-: CPT | Mod: 26

## 2020-01-04 RX ORDER — INSULIN GLARGINE 100 [IU]/ML
60 INJECTION, SOLUTION SUBCUTANEOUS AT BEDTIME
Refills: 0 | Status: DISCONTINUED | OUTPATIENT
Start: 2020-01-04 | End: 2020-01-04

## 2020-01-04 RX ORDER — CHLORHEXIDINE GLUCONATE 213 G/1000ML
1 SOLUTION TOPICAL
Refills: 0 | Status: DISCONTINUED | OUTPATIENT
Start: 2020-01-04 | End: 2020-01-18

## 2020-01-04 RX ORDER — INSULIN LISPRO 100/ML
10 VIAL (ML) SUBCUTANEOUS
Refills: 0 | Status: DISCONTINUED | OUTPATIENT
Start: 2020-01-04 | End: 2020-01-08

## 2020-01-04 RX ORDER — RISPERIDONE 4 MG/1
0.5 TABLET ORAL THREE TIMES A DAY
Refills: 0 | Status: DISCONTINUED | OUTPATIENT
Start: 2020-01-04 | End: 2020-03-13

## 2020-01-04 RX ORDER — SODIUM CHLORIDE 9 MG/ML
1000 INJECTION, SOLUTION INTRAVENOUS
Refills: 0 | Status: DISCONTINUED | OUTPATIENT
Start: 2020-01-04 | End: 2020-03-13

## 2020-01-04 RX ORDER — ATORVASTATIN CALCIUM 80 MG/1
40 TABLET, FILM COATED ORAL AT BEDTIME
Refills: 0 | Status: DISCONTINUED | OUTPATIENT
Start: 2020-01-04 | End: 2020-03-13

## 2020-01-04 RX ORDER — LANOLIN ALCOHOL/MO/W.PET/CERES
5 CREAM (GRAM) TOPICAL AT BEDTIME
Refills: 0 | Status: DISCONTINUED | OUTPATIENT
Start: 2020-01-04 | End: 2020-03-13

## 2020-01-04 RX ORDER — FAMOTIDINE 10 MG/ML
20 INJECTION INTRAVENOUS DAILY
Refills: 0 | Status: DISCONTINUED | OUTPATIENT
Start: 2020-01-04 | End: 2020-01-12

## 2020-01-04 RX ORDER — DEXTROSE 50 % IN WATER 50 %
25 SYRINGE (ML) INTRAVENOUS ONCE
Refills: 0 | Status: DISCONTINUED | OUTPATIENT
Start: 2020-01-04 | End: 2020-03-13

## 2020-01-04 RX ORDER — INSULIN LISPRO 100/ML
14 VIAL (ML) SUBCUTANEOUS
Refills: 0 | Status: DISCONTINUED | OUTPATIENT
Start: 2020-01-04 | End: 2020-01-04

## 2020-01-04 RX ORDER — PIPERACILLIN AND TAZOBACTAM 4; .5 G/20ML; G/20ML
3.38 INJECTION, POWDER, LYOPHILIZED, FOR SOLUTION INTRAVENOUS EVERY 8 HOURS
Refills: 0 | Status: DISCONTINUED | OUTPATIENT
Start: 2020-01-04 | End: 2020-01-05

## 2020-01-04 RX ORDER — INSULIN LISPRO 100/ML
10 VIAL (ML) SUBCUTANEOUS
Refills: 0 | Status: DISCONTINUED | OUTPATIENT
Start: 2020-01-04 | End: 2020-01-04

## 2020-01-04 RX ORDER — OXYCODONE AND ACETAMINOPHEN 5; 325 MG/1; MG/1
1 TABLET ORAL ONCE
Refills: 0 | Status: DISCONTINUED | OUTPATIENT
Start: 2020-01-04 | End: 2020-01-04

## 2020-01-04 RX ORDER — ALPRAZOLAM 0.25 MG
0.25 TABLET ORAL EVERY 8 HOURS
Refills: 0 | Status: DISCONTINUED | OUTPATIENT
Start: 2020-01-04 | End: 2020-01-05

## 2020-01-04 RX ORDER — CAPSAICIN 0.025 %
1 CREAM (GRAM) TOPICAL
Qty: 0 | Refills: 0 | DISCHARGE

## 2020-01-04 RX ORDER — INSULIN GLARGINE 100 [IU]/ML
60 INJECTION, SOLUTION SUBCUTANEOUS EVERY MORNING
Refills: 0 | Status: DISCONTINUED | OUTPATIENT
Start: 2020-01-04 | End: 2020-01-04

## 2020-01-04 RX ORDER — CLOPIDOGREL BISULFATE 75 MG/1
75 TABLET, FILM COATED ORAL DAILY
Refills: 0 | Status: DISCONTINUED | OUTPATIENT
Start: 2020-01-04 | End: 2020-01-09

## 2020-01-04 RX ORDER — IPRATROPIUM/ALBUTEROL SULFATE 18-103MCG
3 AEROSOL WITH ADAPTER (GRAM) INHALATION EVERY 6 HOURS
Refills: 0 | Status: DISCONTINUED | OUTPATIENT
Start: 2020-01-04 | End: 2020-03-13

## 2020-01-04 RX ORDER — BUDESONIDE AND FORMOTEROL FUMARATE DIHYDRATE 160; 4.5 UG/1; UG/1
2 AEROSOL RESPIRATORY (INHALATION)
Refills: 0 | Status: DISCONTINUED | OUTPATIENT
Start: 2020-01-04 | End: 2020-03-13

## 2020-01-04 RX ORDER — DEXTROSE 50 % IN WATER 50 %
15 SYRINGE (ML) INTRAVENOUS ONCE
Refills: 0 | Status: DISCONTINUED | OUTPATIENT
Start: 2020-01-04 | End: 2020-03-13

## 2020-01-04 RX ORDER — ASPIRIN/CALCIUM CARB/MAGNESIUM 324 MG
81 TABLET ORAL DAILY
Refills: 0 | Status: DISCONTINUED | OUTPATIENT
Start: 2020-01-04 | End: 2020-03-13

## 2020-01-04 RX ORDER — GLUCAGON INJECTION, SOLUTION 0.5 MG/.1ML
1 INJECTION, SOLUTION SUBCUTANEOUS ONCE
Refills: 0 | Status: DISCONTINUED | OUTPATIENT
Start: 2020-01-04 | End: 2020-03-13

## 2020-01-04 RX ORDER — ACETAMINOPHEN 500 MG
975 TABLET ORAL ONCE
Refills: 0 | Status: COMPLETED | OUTPATIENT
Start: 2020-01-04 | End: 2020-01-04

## 2020-01-04 RX ORDER — KETOROLAC TROMETHAMINE 30 MG/ML
15 SYRINGE (ML) INJECTION ONCE
Refills: 0 | Status: DISCONTINUED | OUTPATIENT
Start: 2020-01-04 | End: 2020-01-04

## 2020-01-04 RX ORDER — DEXTROSE 50 % IN WATER 50 %
12.5 SYRINGE (ML) INTRAVENOUS ONCE
Refills: 0 | Status: DISCONTINUED | OUTPATIENT
Start: 2020-01-04 | End: 2020-03-13

## 2020-01-04 RX ORDER — INSULIN LISPRO 100/ML
VIAL (ML) SUBCUTANEOUS
Refills: 0 | Status: DISCONTINUED | OUTPATIENT
Start: 2020-01-04 | End: 2020-03-13

## 2020-01-04 RX ORDER — ENOXAPARIN SODIUM 100 MG/ML
40 INJECTION SUBCUTANEOUS DAILY
Refills: 0 | Status: DISCONTINUED | OUTPATIENT
Start: 2020-01-04 | End: 2020-03-13

## 2020-01-04 RX ORDER — FAMOTIDINE 10 MG/ML
40 INJECTION INTRAVENOUS
Refills: 0 | Status: COMPLETED | OUTPATIENT
Start: 2020-01-04 | End: 2020-01-05

## 2020-01-04 RX ORDER — INSULIN LISPRO 100/ML
VIAL (ML) SUBCUTANEOUS AT BEDTIME
Refills: 0 | Status: DISCONTINUED | OUTPATIENT
Start: 2020-01-04 | End: 2020-03-13

## 2020-01-04 RX ORDER — INSULIN GLARGINE 100 [IU]/ML
40 INJECTION, SOLUTION SUBCUTANEOUS AT BEDTIME
Refills: 0 | Status: DISCONTINUED | OUTPATIENT
Start: 2020-01-04 | End: 2020-01-08

## 2020-01-04 RX ADMIN — RISPERIDONE 0.5 MILLIGRAM(S): 4 TABLET ORAL at 05:07

## 2020-01-04 RX ADMIN — PIPERACILLIN AND TAZOBACTAM 25 GRAM(S): 4; .5 INJECTION, POWDER, LYOPHILIZED, FOR SOLUTION INTRAVENOUS at 12:46

## 2020-01-04 RX ADMIN — FAMOTIDINE 20 MILLIGRAM(S): 10 INJECTION INTRAVENOUS at 05:04

## 2020-01-04 RX ADMIN — BUDESONIDE AND FORMOTEROL FUMARATE DIHYDRATE 2 PUFF(S): 160; 4.5 AEROSOL RESPIRATORY (INHALATION) at 17:36

## 2020-01-04 RX ADMIN — BUDESONIDE AND FORMOTEROL FUMARATE DIHYDRATE 2 PUFF(S): 160; 4.5 AEROSOL RESPIRATORY (INHALATION) at 05:04

## 2020-01-04 RX ADMIN — PIPERACILLIN AND TAZOBACTAM 25 GRAM(S): 4; .5 INJECTION, POWDER, LYOPHILIZED, FOR SOLUTION INTRAVENOUS at 05:04

## 2020-01-04 RX ADMIN — Medication 3 MILLILITER(S): at 12:45

## 2020-01-04 RX ADMIN — Medication 1: at 18:08

## 2020-01-04 RX ADMIN — OXYCODONE AND ACETAMINOPHEN 1 TABLET(S): 5; 325 TABLET ORAL at 12:43

## 2020-01-04 RX ADMIN — CLOPIDOGREL BISULFATE 75 MILLIGRAM(S): 75 TABLET, FILM COATED ORAL at 12:46

## 2020-01-04 RX ADMIN — ATORVASTATIN CALCIUM 40 MILLIGRAM(S): 80 TABLET, FILM COATED ORAL at 22:12

## 2020-01-04 RX ADMIN — Medication 81 MILLIGRAM(S): at 12:46

## 2020-01-04 RX ADMIN — Medication 3: at 09:42

## 2020-01-04 RX ADMIN — INSULIN GLARGINE 40 UNIT(S): 100 INJECTION, SOLUTION SUBCUTANEOUS at 22:34

## 2020-01-04 RX ADMIN — PIPERACILLIN AND TAZOBACTAM 25 GRAM(S): 4; .5 INJECTION, POWDER, LYOPHILIZED, FOR SOLUTION INTRAVENOUS at 22:11

## 2020-01-04 RX ADMIN — Medication 3 MILLILITER(S): at 05:07

## 2020-01-04 RX ADMIN — Medication 975 MILLIGRAM(S): at 02:37

## 2020-01-04 RX ADMIN — RISPERIDONE 0.5 MILLIGRAM(S): 4 TABLET ORAL at 17:36

## 2020-01-04 RX ADMIN — Medication 0.25 MILLIGRAM(S): at 23:13

## 2020-01-04 RX ADMIN — Medication 15 MILLIGRAM(S): at 21:25

## 2020-01-04 RX ADMIN — Medication 975 MILLIGRAM(S): at 03:00

## 2020-01-04 RX ADMIN — Medication 10 UNIT(S): at 13:26

## 2020-01-04 RX ADMIN — RISPERIDONE 0.5 MILLIGRAM(S): 4 TABLET ORAL at 22:12

## 2020-01-04 RX ADMIN — INSULIN GLARGINE 60 UNIT(S): 100 INJECTION, SOLUTION SUBCUTANEOUS at 09:42

## 2020-01-04 RX ADMIN — Medication 15 MILLIGRAM(S): at 20:55

## 2020-01-04 RX ADMIN — Medication 14 UNIT(S): at 18:08

## 2020-01-04 RX ADMIN — ENOXAPARIN SODIUM 40 MILLIGRAM(S): 100 INJECTION SUBCUTANEOUS at 12:46

## 2020-01-04 RX ADMIN — Medication 3 MILLILITER(S): at 17:36

## 2020-01-04 RX ADMIN — Medication 2: at 13:26

## 2020-01-04 RX ADMIN — OXYCODONE AND ACETAMINOPHEN 1 TABLET(S): 5; 325 TABLET ORAL at 13:45

## 2020-01-04 RX ADMIN — Medication 10 UNIT(S): at 09:42

## 2020-01-04 RX ADMIN — Medication 3 MILLILITER(S): at 23:09

## 2020-01-04 RX ADMIN — Medication 5 MILLIGRAM(S): at 22:12

## 2020-01-04 NOTE — H&P ADULT - PROBLEM SELECTOR PLAN 3
CT chest ordered  hx of COPD on 2L NC as baseline per chart  treatment as above  COPD treatment w/ duoneb and budesonide/formoterol

## 2020-01-04 NOTE — CONSULT NOTE ADULT - ASSESSMENT
Assessment  DMT2: 59y Male with DM T2 with hyperglycemia, was on large dose insulin at home, blood sugars running high, no hypoglycemic episode,  eating meals,  non compliant with low carb diet.  CAD: on medications, no chest pain, stable, monitored.  HTN: Controlled,  on antihypertensive medications.  Overweight/Obesity: No strict exercise routines, not on any weight loss program, neither on low calorie diet.          Laury Romero MD  Cell: [phone]  Office: [phone]

## 2020-01-04 NOTE — H&P ADULT - ATTENDING COMMENTS
Patient assigned to me by night hospitalist in charge for management and care for patient for this evening only. Care to be resumed by day hospitalist Dr. Huang at 08:00 in the morning and thereafter.

## 2020-01-04 NOTE — H&P ADULT - PROBLEM SELECTOR PLAN 5
s/p mark catheter w/ reported 2L removed  - repeat cmp stat w/ lytes  - UA does not appear + for UTI and urine clear on output

## 2020-01-04 NOTE — CHART NOTE - NSCHARTNOTEFT_GEN_A_CORE
Patient with social work consult. LMSW met with patient at bedside. Patient is A&Ox4. Patient reports that he presents from [facility] [place]. Patient states that he is a resident at the facility, however, does not wish to return there.  explained that patient will likely need to return to facility and then transfer to another facility is desired.     Patient to be admitted. Plan for patient to evaluate by PT and OT.

## 2020-01-04 NOTE — H&P ADULT - PROBLEM SELECTOR PLAN 6
identified on CT a/p w/ noted liver densities  no pain to palpation during exam  - per day hospitalist to follow up. may require heme eval

## 2020-01-04 NOTE — H&P ADULT - NSHPPHYSICALEXAM_GEN_ALL_CORE
Vital Signs Last 24 Hrs  T(C): 36.8 (04 Jan 2020 01:07), Max: 37.2 (03 Jan 2020 19:36)  T(F): 98.3 (04 Jan 2020 01:07), Max: 98.9 (03 Jan 2020 19:36)  HR: 109 (04 Jan 2020 01:07) (89 - 130)  BP: 109/72 (04 Jan 2020 01:07) (109/72 - 119/72)  RR: 21 (04 Jan 2020 01:07) (20 - 27)  SpO2: 98% (04 Jan 2020 01:07) (97% - 100%) 3L NC    GENERAL: NAD, obese, initially sleeping  HEAD:  Atraumatic, Normocephalic  EYES: EOMI, PERRLA, conjunctiva and sclera clear  Mouth: dry oral mucosa, no lesions  NECK: Supple, no appreciable masses  Lung: normal work of breathing, mild crackles at bases  Chest: S1&S2+, rrr, no m/r/g appreciated  ABDOMEN: bs+, soft, nt, distended abdomen however able to push in without discomfort, not rigid  : mark cathter in place with yellow urine w/o purulence appreciated, no CVA tenderness  EXTREMITIES:  radial pulse present b/l, PT present b/l, no pitting edema present  Neuro: A&Ox3, no paralysis in extremities apprecaited  SKIN: warm and dry, no visible purulence in exposed areas

## 2020-01-04 NOTE — CONSULT NOTE ADULT - PROBLEM SELECTOR PROBLEM 2
Coronary artery disease involving native coronary artery of native heart without angina pectoris
Severe sepsis
Attending Only

## 2020-01-04 NOTE — CONSULT NOTE ADULT - SUBJECTIVE AND OBJECTIVE BOX
Patient is a 59y old  Male who presents with a chief complaint of 59M w/ n/v/d and hyperglycemia sent in from SNF (2020 04:08)      HPI:  59M w/ COPD on 2L NC, DM2 on insulin, CAD s/p stent, HFpEF, pulm HTN, HTN, charted hx of schizophrenia/bipolar disorder, panic disorder, and recent admit for COPD exacerbation d/c 19 presents from [facility] ([facility]) for n/v/d and hyperglycemia. Patient reports that over last 3d he has been experiencing chills and sweating w/ nausea and NBNB vomit with loose non-bloody stool. He also says his sugar was elevated but does not provide additional hx other than on ROS- no fever, cp, palpitations, sob, cough, abdominal pain, rash, joint pain or swelling, wounds or trauma. History otherwise collected from charted.    In the ED, VS 98.1, 116/75, 130, 25 97% 4L NC -? placed on BiPAP and the eventually weaned to 3L NC  s/p lantus 60Ux1 (15:43), humalog 10U x1 SQ(14:40), insulin regular 6U IV(15:46), calcium gluconate 2g (15:45), acetaminophen [id number], 3L LR, duoneb 3mL x3, zosyn (16:22) (2020 04:08)    he slaso tells me that he got SOB along with all other symptoms and NH could not help him       ?FOLLOWING PRESENT  [ x] Hx of PE/DVT, [y ] Hx COPD, [ x] Hx of Asthma, [ y] Hx of Hospitalization, x[ ]  Hx of BiPAP/CPAP use, [x Hx of CYNTHIA    Allergies    No Known Allergies    Intolerances        PAST MEDICAL & SURGICAL HISTORY:  Oxygen dependent  Gastroesophageal reflux disease, esophagitis presence not specified  Smoker  Bipolar 1 disorder  Coronary artery disease involving native coronary artery of native heart without angina pectoris  Type 2 diabetes mellitus without complication, with long-term current use of insulin  Pulmonary HTN  HLD (hyperlipidemia)  Stented coronary artery  COPD (chronic obstructive pulmonary disease)  No significant past surgical history      FAMILY HISTORY:  No family history of mental disorder  No family history of hypertension  No family history of chronic obstructive pulmonary disease  No family history of cardiovascular disease      Social History: [30 years: still smkes  ] TOBACCO                  [ x ] ETOH                                 [  x] IVDA/DRUGS    REVIEW OF SYSTEMS      General:	x    Skin/Breast:x  	  Ophthalmologic:  	x  ENMT:	x    Respiratory and Thorax: sob,   	  Cardiovascular:	x    Gastrointestinal:	nausea and vomiting    Genitourinary:	x    Musculoskeletal:	  x  Neurological:	x    Psychiatric:	x    Hematology/Lymphatics:	x    Endocrine:	  xx  Allergic/Immunologic:	x    MEDICATIONS  (STANDING):  albuterol/ipratropium for Nebulization 3 milliLiter(s) Nebulizer every 6 hours  aspirin enteric coated 81 milliGRAM(s) Oral daily  atorvastatin 40 milliGRAM(s) Oral at bedtime  budesonide 160 MICROgram(s)/formoterol 4.5 MICROgram(s) Inhaler 2 Puff(s) Inhalation two times a day  clopidogrel Tablet 75 milliGRAM(s) Oral daily  dextrose 5%. 1000 milliLiter(s) (50 mL/Hr) IV Continuous <Continuous>  dextrose 50% Injectable 12.5 Gram(s) IV Push once  dextrose 50% Injectable 25 Gram(s) IV Push once  dextrose 50% Injectable 25 Gram(s) IV Push once  enoxaparin Injectable 40 milliGRAM(s) SubCutaneous daily  famotidine    Tablet 20 milliGRAM(s) Oral daily  insulin glargine Injectable (LANTUS) 60 Unit(s) SubCutaneous every morning  insulin glargine Injectable (LANTUS) 60 Unit(s) SubCutaneous at bedtime  insulin lispro (HumaLOG) corrective regimen sliding scale   SubCutaneous three times a day before meals  insulin lispro (HumaLOG) corrective regimen sliding scale   SubCutaneous at bedtime  insulin lispro Injectable (HumaLOG) 10 Unit(s) SubCutaneous three times a day before meals  melatonin 5 milliGRAM(s) Oral at bedtime  piperacillin/tazobactam IVPB.. 3.375 Gram(s) IV Intermittent every 8 hours  risperiDONE   Tablet 0.5 milliGRAM(s) Oral three times a day    MEDICATIONS  (PRN):  ALPRAZolam 0.25 milliGRAM(s) Oral every 8 hours PRN panic attack  dextrose 40% Gel 15 Gram(s) Oral once PRN Blood Glucose LESS THAN 70 milliGRAM(s)/deciliter  glucagon  Injectable 1 milliGRAM(s) IntraMuscular once PRN Glucose LESS THAN 70 milligrams/deciliter       Vital Signs Last 24 Hrs  T(C): 37.1 (2020 08:12), Max: 37.2 (2020 19:36)  T(F): 98.8 (2020 08:12), Max: 98.9 (2020 19:36)  HR: 92 (2020 08:12) (89 - 130)  BP: 107/69 (2020 08:12) (107/69 - 119/72)  BP(mean): --  RR: 18 (2020 08:12) (18 - 27)  SpO2: 95% (2020 08:12) (95% - 100%)        I&O's Summary    2020 07:01  -  2020 07:00  --------------------------------------------------------  IN: 0 mL / OUT: 800 mL / NET: -800 mL        Physical Exam:   GENERAL: Obese+  HEENT: GALO/   Atraumatic, Normocephalic  ENMT: No tonsillar erythema, exudates, or enlargement; Moist mucous membranes, Good dentition, No lesions  NECK: Supple, No JVD, Normal thyroid  CHEST/LUNG:poor air entry   CVS: Regular rate and rhythm; No murmurs, rubs, or gallops  GI: : Soft, Nontender, Nondistended; Bowel sounds present  NERVOUS SYSTEM:  Alert & Oriented X3  EXTREMITIES:  - edema  LYMPH: No lymphadenopathy noted  SKIN: No rashes or lesions  ENDOCRINOLOGY: No Thyromegaly  PSYCH: Appropriate    Labs:  UNK<58<4>>56<<7.365>>Venous<<3><<4><<5<<569>>, Venous<56<4>>50<<7.345>>Venous<<3><<4><<5<<509>>, 0.4<58<4>>47<<7.305>>0.4<<3><<4><<5<<479>>                            11.7   7.68  )-----------( 160      ( 2020 05:09 )             36.8                         11.9   7.91  )-----------( 157      ( 2020 03:44 )             36.6                         14.4   13.90 )-----------( 196      ( 2020 13:50 )             46.1     01-04    129<L>  |  93<L>  |  14  ----------------------------<  301<H>  3.9   |  27  |  0.61  01-03    132<L>  |  89<L>  |  22  ----------------------------<  452<HH>  4.6   |  25  |  0.70  01-03    126<L>  |  89<L>  |  30<H>  ----------------------------<  631<HH>  5.9<H>   |  22  |  0.81    Ca    8.5      2020 03:47  Ca    8.9      2020 17:56  Ca    8.9      2020 13:50  Phos  2.5     01-04  Mg     1.7     01-04    TPro  5.7<L>  /  Alb  3.4  /  TBili  0.3  /  DBili  x   /  AST  21  /  ALT  28  /  AlkPhos  53  01-04  TPro  7.4  /  Alb  4.3  /  TBili  0.6  /  DBili  x   /  AST  16  /  ALT  20  /  AlkPhos  74  01-03    CAPILLARY BLOOD GLUCOSE      POCT Blood Glucose.: 271 mg/dL (2020 09:21)  POCT Blood Glucose.: 257 mg/dL (2020 01:29)  POCT Blood Glucose.: 306 mg/dL (2020 22:14)  POCT Blood Glucose.: 464 mg/dL (2020 15:43)  POCT Blood Glucose.: 507 mg/dL (2020 14:39)  POCT Blood Glucose.: 550 mg/dL (2020 13:34)    LIVER FUNCTIONS - ( 2020 03:47 )  Alb: 3.4 g/dL / Pro: 5.7 g/dL / ALK PHOS: 53 U/L / ALT: 28 U/L / AST: 21 U/L / GGT: x           PT/INR - ( 2020 03:44 )   PT: 12.4 sec;   INR: 1.08 ratio         PTT - ( 2020 03:44 )  PTT:26.1 sec  Urinalysis Basic - ( 2020 22:18 )    Color: Light Yellow / Appearance: Clear / S.035 / pH: x  Gluc: x / Ketone: Small  / Bili: Negative / Urobili: Negative   Blood: x / Protein: Negative / Nitrite: Negative   Leuk Esterase: Negative / RBC: 11 /hpf / WBC 0 /HPF   Sq Epi: x / Non Sq Epi: 0 /hpf / Bacteria: Negative      D DImer      Studies  Chest X-RAY  CT SCAN Chest   CT Abdomen  Venous Dopplers: LE:   Others    < from: CT Chest No Cont (20 @ 04:06) >    EXAM:  CT CHEST                            PROCEDURE DATE:  2020            INTERPRETATION:  CLINICAL INFORMATION: History of COPD presenting with severe sepsis and hypoxia.    COMPARISON: Chest CT dated 12/3/2019. CT abdomen pelvis dated 1/3/2020.    PROCEDURE:   CT of the Chest was performed without intravenous contrast.  Sagittal and coronal reformats were performed.      FINDINGS:    LUNGS AND AIRWAYS: Patent central airways. Centrilobular emphysema. Bibasilar subsegmental atelectasis. No focal consolidation or parenchymal abnormality.    PLEURA: No pleural effusion.    MEDIASTINUM AND [place]: No lymphadenopathy.    VESSELS: Atherosclerotic calcifications of the aorta and coronary arteries. Enlarged main pulmonary artery measuring 4.2 cm, compatible with pulmonary arterial hypertension.    HEART: Heart size is normal. No pericardial effusion.    CHEST WALL AND LOWER NECK: Bilateral gynecomastia.    VISUALIZED UPPER ABDOMEN: Stable 1.5 cm left adrenal nodule.    BONES: Healed bilateral rib fractures. Degenerative changes of the spine.    IMPRESSION:     Emphysema.    No lung mass or consolidation.            < from: CT Chest No Cont (20 @ 04:06) >      PROCEDURE DATE:  2020            INTERPRETATION:  CLINICAL INFORMATION: History of COPD presenting with severe sepsis and hypoxia.    COMPARISON: Chest CT dated 12/3/2019. CT abdomen pelvis dated 1/3/2020.    PROCEDURE:   CT of the Chest was performed without intravenous contrast.  Sagittal and coronal reformats were performed.      FINDINGS:    LUNGS AND AIRWAYS: Patent central airways. Centrilobular emphysema. Bibasilar subsegmental atelectasis. No focal consolidation or parenchymal abnormality.    PLEURA: No pleural effusion.    MEDIASTINUM AND [place]: No lymphadenopathy.    VESSELS: Atherosclerotic calcifications of the aorta and coronary arteries. Enlarged main pulmonary artery measuring 4.2 cm, compatible with pulmonary arterial hypertension.    HEART: Heart size is normal. No pericardial effusion.    CHEST WALL AND LOWER NECK: Bilateral gynecomastia.    VISUALIZED UPPER ABDOMEN: Stable 1.5 cm left adrenal nodule.    BONES: Healed bilateral rib fractures. Degenerative changes of the spine.    IMPRESSION:     Emphysema.    No lung mass or consolidation.                    LES GIBSON M.D., RADIOLOGY RESIDENT  This document has been electronically signed.  MAYDA GRIFFITH M.D., ATTENDING RADIOLOGIST  This document has been electronically signed. 2020 10:01AM        < end of copied text >          LES GIBSON M.D., RADIOLOGY RESIDENT  This document has been electronically signed.  MAYDA GRIFFITH M.D., ATTENDING RADIOLOGIST  This document has been electronically signed. 2020 10:01AM        < end of copied text >

## 2020-01-04 NOTE — CONSULT NOTE ADULT - PROBLEM SELECTOR RECOMMENDATION 9
Will increase Lantus to 40 units at bed time.  Will increase Humalog to 10 units before each meal in addition to Humalog correction scale coverage.  Patient counseled for compliance with consistent low carb diet, exercise as tolerated as out patient.

## 2020-01-04 NOTE — CONSULT NOTE ADULT - SUBJECTIVE AND OBJECTIVE BOX
HPI:  59M w/ COPD on 2L NC, DM2 on insulin, CAD s/p stent, HFpEF, pulm HTN, HTN, charted hx of schizophrenia/bipolar disorder, panic disorder, and recent admit for COPD exacerbation d/c 12/12/19 presents from [facility] (SNF) for n/v/d and hyperglycemia. Patient reports that over last 3d he has been experiencing chills and sweating w/ nausea and NBNB vomit with loose non-bloody stool. He also says his sugar was elevated but does not provide additional hx other than on ROS- no fever, cp, palpitations, sob, cough, abdominal pain, rash, joint pain or swelling, wounds or trauma. History otherwise collected from charted.    In the ED, VS 98.1, 116/75, 130, 25 97% 4L NC -? placed on BiPAP and the eventually weaned to 3L NC  s/p lantus 60Ux1 (15:43), humalog 10U x1 SQ(14:40), insulin regular 6U IV(15:46), calcium gluconate 2g (15:45), acetaminophen [id number], 3L LR, duoneb 3mL x3, zosyn (16:22) (04 Jan 2020 04:08)  Patient has history of diabetes, on large dose insulin at home, no recent hypoglycemic episodes, no polyuria polydipsia. Patient follows up with PCP for diabetes management.    PAST MEDICAL & SURGICAL HISTORY:  Oxygen dependent  Gastroesophageal reflux disease, esophagitis presence not specified  Smoker  Bipolar 1 disorder  Coronary artery disease involving native coronary artery of native heart without angina pectoris  Type 2 diabetes mellitus without complication, with long-term current use of insulin  Pulmonary HTN  HLD (hyperlipidemia)  Stented coronary artery  COPD (chronic obstructive pulmonary disease)  No significant past surgical history      FAMILY HISTORY:  No family history of mental disorder  No family history of hypertension  No family history of chronic obstructive pulmonary disease  No family history of cardiovascular disease      Social History:    Outpatient Medications:    MEDICATIONS  (STANDING):  albuterol/ipratropium for Nebulization 3 milliLiter(s) Nebulizer every 6 hours  aspirin enteric coated 81 milliGRAM(s) Oral daily  atorvastatin 40 milliGRAM(s) Oral at bedtime  budesonide 160 MICROgram(s)/formoterol 4.5 MICROgram(s) Inhaler 2 Puff(s) Inhalation two times a day  clopidogrel Tablet 75 milliGRAM(s) Oral daily  dextrose 5%. 1000 milliLiter(s) (50 mL/Hr) IV Continuous <Continuous>  dextrose 50% Injectable 12.5 Gram(s) IV Push once  dextrose 50% Injectable 25 Gram(s) IV Push once  dextrose 50% Injectable 25 Gram(s) IV Push once  enoxaparin Injectable 40 milliGRAM(s) SubCutaneous daily  famotidine    Tablet 20 milliGRAM(s) Oral daily  insulin glargine Injectable (LANTUS) 40 Unit(s) SubCutaneous at bedtime  insulin lispro (HumaLOG) corrective regimen sliding scale   SubCutaneous three times a day before meals  insulin lispro (HumaLOG) corrective regimen sliding scale   SubCutaneous at bedtime  insulin lispro Injectable (HumaLOG) 10 Unit(s) SubCutaneous three times a day with meals  ketorolac   Injectable 15 milliGRAM(s) IV Push once  melatonin 5 milliGRAM(s) Oral at bedtime  piperacillin/tazobactam IVPB.. 3.375 Gram(s) IV Intermittent every 8 hours  risperiDONE   Tablet 0.5 milliGRAM(s) Oral three times a day    MEDICATIONS  (PRN):  ALPRAZolam 0.25 milliGRAM(s) Oral every 8 hours PRN panic attack  dextrose 40% Gel 15 Gram(s) Oral once PRN Blood Glucose LESS THAN 70 milliGRAM(s)/deciliter  glucagon  Injectable 1 milliGRAM(s) IntraMuscular once PRN Glucose LESS THAN 70 milligrams/deciliter      Allergies    No Known Allergies    Intolerances      Review of Systems:  Constitutional: No fever, no chills  Eyes: No blurry vision  Neuro: No tremors  HEENT: No pain, no neck swelling  Cardiovascular: No chest pain, no palpitations  Respiratory: No SOB, no cough  GI: No nausea, vomiting, abdominal pain  : No dysuria  Skin: no rash  MSK: Has leg swelling.  Psych: no depression  Endocrine: no polyuria, polydipsia    UNABLE TO OBTAIN    ALL OTHER SYSTEMS REVIEWED AND NEGATIVE        PHYSICAL EXAM:  VITALS: T(C): 36.4 (01-04-20 @ 16:10)  T(F): 97.5 (01-04-20 @ 16:10), Max: 98.9 (01-03-20 @ 19:36)  HR: 95 (01-04-20 @ 16:10) (89 - 109)  BP: 96/57 (01-04-20 @ 16:10) (96/57 - 119/72)  RR:  (17 - 23)  SpO2:  (95% - 100%)  Wt(kg): --  GENERAL: NAD, well-groomed, well-developed  EYES: No proptosis, no lid lag  HEENT:  Atraumatic, Normocephalic  THYROID: Normal size, no palpable nodules  RESPIRATORY: Clear to auscultation bilaterally; No rales, rhonchi, wheezing  CARDIOVASCULAR: Si S2, No murmurs;  GI: Soft, non distended, normal bowel sounds  SKIN: Dry, intact, No rashes or lesions  MUSCULOSKELETAL: Has BL lower extremity edema.  NEURO:  no tremor, sensation decreased in feet BL,    POCT Blood Glucose.: 183 mg/dL (01-04-20 @ 17:46)  POCT Blood Glucose.: 209 mg/dL (01-04-20 @ 13:06)  POCT Blood Glucose.: 271 mg/dL (01-04-20 @ 09:21)  POCT Blood Glucose.: 257 mg/dL (01-04-20 @ 01:29)  POCT Blood Glucose.: 306 mg/dL (01-03-20 @ 22:14)  POCT Blood Glucose.: 464 mg/dL (01-03-20 @ 15:43)  POCT Blood Glucose.: 507 mg/dL (01-03-20 @ 14:39)  POCT Blood Glucose.: 550 mg/dL (01-03-20 @ 13:34)                            11.7   7.68  )-----------( 160      ( 04 Jan 2020 05:09 )             36.8       01-04    129<L>  |  93<L>  |  14  ----------------------------<  301<H>  3.9   |  27  |  0.61    EGFR if : 128  EGFR if non : 111    Ca    8.5      01-04  Mg     1.7     01-04  Phos  2.5     01-04    TPro  5.7<L>  /  Alb  3.4  /  TBili  0.3  /  DBili  x   /  AST  21  /  ALT  28  /  AlkPhos  53  01-04      Thyroid Function Tests:      Hemoglobin A1C, Whole Blood: 10.4 % <H> [4.0 - 5.6] (12-04-19 @ 08:53)          Radiology:

## 2020-01-04 NOTE — H&P ADULT - PROBLEM SELECTOR PLAN 10
#L adrenal nodule- per day hospitalist  to further evaluate. not suspected part of acute presentation of above at time of admit.  Transitions of Care Status:  1.  Name of PCP: Dr. Romero?  2.  PCP Contacted on Admission: [ ] Y    [ X] N    3.  PCP contacted at Discharge: [ ] Y    [ ] N    [ ] N/A  4.  Post-Discharge Appointment Date and Location:  5.  Summary of Handoff given to PCP:  ==========================  istop [id number]  Rx Written	Rx Dispensed	Drug	Quantity	Days Supply	Prescriber Name  12/13/2019	12/27/2019	alprazolam 0.25 mg tablet	15	15	Rajwinder Aparicio  12/23/2019	12/23/2019	oxycodone-acetaminophen 5-325 mg tablet	20	5	Elvis Evans #L adrenal nodule- per day hospitalist  to further evaluate. not suspected part of acute presentation of above at time of admit.  #anemia- repeat cbc w/ decreased Hb. no signs of hemorrhage on exam. Suspect dilutional from IV fluid resuscitation by ED. repeat cbc with type and screen. will likely need to trend in afternoon as it will take time for Hb to equilibrate  Transitions of Care Status:  1.  Name of PCP: Dr. Romero?  2.  PCP Contacted on Admission: [ ] Y    [ X] N    3.  PCP contacted at Discharge: [ ] Y    [ ] N    [ ] N/A  4.  Post-Discharge Appointment Date and Location:  5.  Summary of Handoff given to PCP:  ==========================  istop [id number]  Rx Written	Rx Dispensed	Drug	Quantity	Days Supply	Prescriber Name  12/13/2019	12/27/2019	alprazolam 0.25 mg tablet	15	15	Rajwinder Aparicio  12/23/2019	12/23/2019	oxycodone-acetaminophen 5-325 mg tablet	20	5	Elvis Evans

## 2020-01-04 NOTE — H&P ADULT - PROBLEM SELECTOR PLAN 2
tachycardia, leukocytosis, bandemia  - possible sources includes bronchitis, gastroenteritis, and bacteremia.  - c/w zosyn as patient has improved since initially dosing  - CT chest given worsening of chronic respiratory failure

## 2020-01-04 NOTE — H&P ADULT - NSHPREVIEWOFSYSTEMS_GEN_ALL_CORE
CONSTITUTIONAL: No fever, chills+, diaphoresis+  EYES: No eye pain, no blindness  Mouth: no pain in mouth, no cuts  RESPIRATORY: No cough, chronic oxygen used for COPD  CARDIOVASCULAR: No CP, no palpitations  GASTROINTESTINAL: no abdominal pain, n/v/d+  GENITOURINARY: No dysuria, no hematuria  Heme: No easy bruising, no swelling appreciated in neck  NEUROLOGICAL: No seizure, no paralysis  SKIN: No itching, no rashes  MUSCULOSKELETAL: No joint pain, no joint swelling

## 2020-01-04 NOTE — H&P ADULT - NSHPLABSRESULTS_GEN_ALL_CORE
Personally reviewed available labs, imaging and ekg  Complete Blood Count + Automated Diff (20 @ 13:50)    WBC Count: 13.90 K/uL    RBC Count: 5.10 M/uL    Hemoglobin: 14.4 g/dL    Hematocrit: 46.1 %    Mean Cell Volume: 90.4 fl    Mean Cell Hemoglobin: 28.2 pg    Mean Cell Hemoglobin Conc: 31.2 gm/dL    Red Cell Distrib Width: 12.6 %    Platelet Count - Automated: 196 K/uL    Auto Neutrophil #: 12.93 K/uL    Auto Lymphocyte #: 0.14 K/uL    Auto Monocyte #: 0.28 K/uL    Auto Eosinophil #: 0.00 K/uL    Auto Basophil #: 0.00 K/uL    Auto Neutrophil %: 76.0: Differential percentages must be correlated with absolute numbers for  clinical significance. %    Auto Lymphocyte %: 1.0 %    Auto Monocyte %: 2.0 %    Auto Eosinophil %: 0.0 %    Auto Basophil %: 0.0 %  Band Neutrophils %: 17.0: called to dr anne % (20 @ 13:50)  CBC Full  -  ( 2020 03:44 )  WBC Count : 7.91 K/uL  RBC Count : 4.10 M/uL  Hemoglobin : 11.9 g/dL  Hematocrit : 36.6 %  Platelet Count - Automated : 157 K/uL  Mean Cell Volume : 89.3 fl  Mean Cell Hemoglobin : 29.0 pg  Mean Cell Hemoglobin Concentration : 32.5 gm/dL  Auto Neutrophil # : 6.31 K/uL  Auto Lymphocyte # : 0.80 K/uL  Auto Monocyte # : 0.68 K/uL  Auto Eosinophil # : 0.06 K/uL  Auto Basophil # : 0.01 K/uL  Auto Neutrophil % : 79.8 %  Auto Lymphocyte % : 10.1 %  Auto Monocyte % : 8.6 %  Auto Eosinophil % : 0.8 %  Auto Basophil % : 0.1 %  129<L>  |  93<L>  |  14  ----------------------------<  301<H>  3.9   |  27  |  0.61  Ca    8.5      2020 03:47  Phos  2.5     01-04  Mg     1.7     01-04  TPro  5.7<L>  /  Alb  3.4  /  TBili  0.3  /  DBili  x   /  AST  21  /  ALT  28  /  AlkPhos  53  01-04  PT/INR - ( 2020 03:44 )   PT: 12.4 sec;   INR: 1.08 ratio    PTT - ( 2020 03:44 )  PTT:26.1 sec  Urinalysis Basic - ( 2020 22:18 )  Color: Light Yellow / Appearance: Clear / S.035 / pH: x  Gluc: x / Ketone: Small  / Bili: Negative / Urobili: Negative   Blood: x / Protein: Negative / Nitrite: Negative   Leuk Esterase: Negative / RBC: 11 /hpf / WBC 0 /HPF   Sq Epi: x / Non Sq Epi: 0 /hpf / Bacteria: Negative  Beta Hydroxy-Butyrate: 0.8 mmol/L (20 @ 13:50)  POCT Blood Glucose.: 257 mg/dL (2020 01:29)  POCT Blood Glucose.: 306 mg/dL (2020 22:14)  POCT Blood Glucose.: 464 mg/dL (2020 15:43)  POCT Blood Glucose.: 507 mg/dL (2020 14:39)  POCT Blood Glucose.: 550 mg/dL (2020 13:34)  23:48 - VBG - pH: 7.36  | pCO2: 58    | pO2: 56    | Lactate: 1.8    17:56 - VBG - pH: 7.34  | pCO2: 56    | pO2: 50    | Lactate: 4.3    13:50 - VBG - pH: 7.30  | pCO2: 58    | pO2: 47    | Lactate: 3.0    FLU A B RSV Detection by PCR (20 @ 14:11)    Flu A Result: NotDetec:     Flu B Result: NotDetec    RSV Result: NotDetec  Troponin T, High Sensitivity Result: 24: (20 @ 13:50)  Troponin T, High Sensitivity Result: 23: (20 @ 03:47)  Imaging  CT a/p noted splenomegaly, Left adrenal nodule, liver hypodensity, distended bladder  CT chest ordered  EKG sinus tach 120 bpm RBBB QTc<500ms no STEMI appreciated. Previous admit EKG w/ RBBB

## 2020-01-04 NOTE — CONSULT NOTE ADULT - PROBLEM SELECTOR RECOMMENDATION 3
he has copd and is on home oxygen: he seems to be a chr retainer too: would cont oxygen and mantian o2 sao2 above 88%

## 2020-01-04 NOTE — H&P ADULT - HISTORY OF PRESENT ILLNESS
59M w/ COPD on 2L NC, DM2 on insulin, CAD s/p stent, HFpEF, pulm HTN, HTN, charted hx of schizophrenia/bipolar disorder, panic disorder, and recent admit for COPD exacerbation d/c 12/12/19 presents from [facility] ([facility]) for n/v/d and hyperglycemia. Patient reports that over last 3d he has been experiencing chills and sweating w/ nausea and NBNB vomit with loose non-bloody stool. He also says his sugar was elevated but does not provide additional hx other than on ROS- no fever, cp, palpitations, sob, cough, abdominal pain, rash, joint pain or swelling, wounds or trauma. History otherwise collected from charted.    In the ED, VS 98.1, 116/75, 130, 25 97% 4L NC -? placed on BiPAP and the eventually weaned to 3L NC  s/p lantus 60Ux1 (15:43), humalog 10U x1 SQ(14:40), insulin regular 6U IV(15:46), calcium gluconate 2g (15:45), acetaminophen [id number], 3L LR, duoneb 3mL x3, zosyn (16:22)

## 2020-01-04 NOTE — H&P ADULT - PROBLEM SELECTOR PLAN 7
charted schizophrenia, Bipolar disorder and panic disorder  c/w dosing per last hospitalization for risperidone  per iSTOP alprazolam outpatient ordered 0.25mg tabs vs 1mg tabs ordered prn on last hospitalization. for now will have 0.25mg tab available for panic attack w/ hold parameter

## 2020-01-04 NOTE — H&P ADULT - PROBLEM SELECTOR PLAN 1
presented w/ hyperglycemia w/ development of increased AG and elevated beta hydroxybutyrate c/w mild compensated DKA appearing to resolve after IV fluid and insulin given in ED  - appears to be in setting of n/v/d and severe sepsis (tachycardia, leukocytosis, bandemia) suspected from gastroenteritis however evaluation further evaluation required for COPD bronchitis vs bacteremia.  - at time of admit exam and interview patient appears nontoxic and comfortable on 3L NC. ordered stat CMP and beta hydroxybutyrate to ensure gap closed as FS has improved  - DM2 on insulin- will need endocrinology consult to be ordered by Dr. Diaz. For now place on lantus 60U BID and humalog 10U TID premeal w/ ISS and FS, per last hospitalization dosing lat month

## 2020-01-04 NOTE — H&P ADULT - ASSESSMENT
59M w/ COPD on 2L NC, DM2 on insulin, CAD s/p stent, HFpEF, pulm HTN, HTN, charted hx of schizophrenia/bipolar disorder, panic disorder, and recent admit for COPD exacerbation d/c 12/12/19 presents from [facility] (SNF) for n/v/d and hyperglycemia w/ mild DKA (now appearing resolved) w/ severe sepsis with bandemia w/ source suspected from gastroenteritis vs bronchitis.

## 2020-01-04 NOTE — H&P ADULT - PROBLEM SELECTOR PLAN 4
n/v/d over last 3d  maybe from hyperglycemia w/ mild dka vs viral eneteritis. CT a/p completed by ED does no

## 2020-01-04 NOTE — CONSULT NOTE ADULT - PROBLEM SELECTOR RECOMMENDATION 9
on insulin: getting better : defer to endo: VBG noted: he was put on bipap initially when he came to er: now he is Off: seems to be doing reasonable He doesnot seem ot be in chf at this time

## 2020-01-05 DIAGNOSIS — E87.1 HYPO-OSMOLALITY AND HYPONATREMIA: ICD-10-CM

## 2020-01-05 DIAGNOSIS — E87.5 HYPERKALEMIA: ICD-10-CM

## 2020-01-05 DIAGNOSIS — R73.9 HYPERGLYCEMIA, UNSPECIFIED: ICD-10-CM

## 2020-01-05 LAB
ANION GAP SERPL CALC-SCNC: 16 MMOL/L — SIGNIFICANT CHANGE UP (ref 5–17)
BASE EXCESS BLDA CALC-SCNC: 4.2 MMOL/L — HIGH (ref -2–2)
BUN SERPL-MCNC: 14 MG/DL — SIGNIFICANT CHANGE UP (ref 7–23)
CALCIUM SERPL-MCNC: 7.9 MG/DL — LOW (ref 8.4–10.5)
CHLORIDE SERPL-SCNC: 95 MMOL/L — LOW (ref 96–108)
CO2 BLDA-SCNC: 30 MMOL/L — SIGNIFICANT CHANGE UP (ref 22–30)
CO2 SERPL-SCNC: 27 MMOL/L — SIGNIFICANT CHANGE UP (ref 22–31)
CREAT SERPL-MCNC: 0.67 MG/DL — SIGNIFICANT CHANGE UP (ref 0.5–1.3)
GAS PNL BLDA: SIGNIFICANT CHANGE UP
GLUCOSE SERPL-MCNC: 199 MG/DL — HIGH (ref 70–99)
HCO3 BLDA-SCNC: 29 MMOL/L — SIGNIFICANT CHANGE UP (ref 21–29)
HOROWITZ INDEX BLDA+IHG-RTO: 35 — SIGNIFICANT CHANGE UP
OSMOLALITY SERPL: 286 MOSMOL/KG — SIGNIFICANT CHANGE UP (ref 275–300)
OSMOLALITY UR: 591 MOSM/KG — SIGNIFICANT CHANGE UP (ref 50–1200)
PCO2 BLDA: 46 MMHG — SIGNIFICANT CHANGE UP (ref 32–46)
PH BLDA: 7.41 — SIGNIFICANT CHANGE UP (ref 7.35–7.45)
PO2 BLDA: 80 MMHG — SIGNIFICANT CHANGE UP (ref 74–108)
POTASSIUM SERPL-MCNC: 3.8 MMOL/L — SIGNIFICANT CHANGE UP (ref 3.5–5.3)
POTASSIUM SERPL-SCNC: 3.8 MMOL/L — SIGNIFICANT CHANGE UP (ref 3.5–5.3)
SAO2 % BLDA: 97 % — HIGH (ref 92–96)
SODIUM SERPL-SCNC: 138 MMOL/L — SIGNIFICANT CHANGE UP (ref 135–145)

## 2020-01-05 RX ORDER — ALPRAZOLAM 0.25 MG
0.5 TABLET ORAL EVERY 6 HOURS
Refills: 0 | Status: DISCONTINUED | OUTPATIENT
Start: 2020-01-05 | End: 2020-01-09

## 2020-01-05 RX ORDER — OXYCODONE AND ACETAMINOPHEN 5; 325 MG/1; MG/1
2 TABLET ORAL ONCE
Refills: 0 | Status: DISCONTINUED | OUTPATIENT
Start: 2020-01-05 | End: 2020-01-05

## 2020-01-05 RX ORDER — FUROSEMIDE 40 MG
20 TABLET ORAL DAILY
Refills: 0 | Status: DISCONTINUED | OUTPATIENT
Start: 2020-01-05 | End: 2020-03-13

## 2020-01-05 RX ORDER — OXYCODONE AND ACETAMINOPHEN 5; 325 MG/1; MG/1
2 TABLET ORAL EVERY 6 HOURS
Refills: 0 | Status: DISCONTINUED | OUTPATIENT
Start: 2020-01-05 | End: 2020-01-12

## 2020-01-05 RX ORDER — KETOROLAC TROMETHAMINE 30 MG/ML
15 SYRINGE (ML) INJECTION ONCE
Refills: 0 | Status: DISCONTINUED | OUTPATIENT
Start: 2020-01-05 | End: 2020-01-05

## 2020-01-05 RX ADMIN — BUDESONIDE AND FORMOTEROL FUMARATE DIHYDRATE 2 PUFF(S): 160; 4.5 AEROSOL RESPIRATORY (INHALATION) at 05:36

## 2020-01-05 RX ADMIN — OXYCODONE AND ACETAMINOPHEN 2 TABLET(S): 5; 325 TABLET ORAL at 17:55

## 2020-01-05 RX ADMIN — RISPERIDONE 0.5 MILLIGRAM(S): 4 TABLET ORAL at 05:21

## 2020-01-05 RX ADMIN — OXYCODONE AND ACETAMINOPHEN 2 TABLET(S): 5; 325 TABLET ORAL at 23:35

## 2020-01-05 RX ADMIN — Medication 2: at 14:00

## 2020-01-05 RX ADMIN — Medication 3 MILLILITER(S): at 23:55

## 2020-01-05 RX ADMIN — CHLORHEXIDINE GLUCONATE 1 APPLICATION(S): 213 SOLUTION TOPICAL at 05:22

## 2020-01-05 RX ADMIN — INSULIN GLARGINE 40 UNIT(S): 100 INJECTION, SOLUTION SUBCUTANEOUS at 22:28

## 2020-01-05 RX ADMIN — BUDESONIDE AND FORMOTEROL FUMARATE DIHYDRATE 2 PUFF(S): 160; 4.5 AEROSOL RESPIRATORY (INHALATION) at 18:03

## 2020-01-05 RX ADMIN — OXYCODONE AND ACETAMINOPHEN 2 TABLET(S): 5; 325 TABLET ORAL at 11:44

## 2020-01-05 RX ADMIN — RISPERIDONE 0.5 MILLIGRAM(S): 4 TABLET ORAL at 21:28

## 2020-01-05 RX ADMIN — Medication 3 MILLILITER(S): at 12:31

## 2020-01-05 RX ADMIN — FAMOTIDINE 40 MILLIGRAM(S): 10 INJECTION INTRAVENOUS at 05:21

## 2020-01-05 RX ADMIN — Medication 30 MILLILITER(S): at 22:27

## 2020-01-05 RX ADMIN — OXYCODONE AND ACETAMINOPHEN 2 TABLET(S): 5; 325 TABLET ORAL at 12:56

## 2020-01-05 RX ADMIN — Medication 20 MILLIGRAM(S): at 16:52

## 2020-01-05 RX ADMIN — ATORVASTATIN CALCIUM 40 MILLIGRAM(S): 80 TABLET, FILM COATED ORAL at 21:28

## 2020-01-05 RX ADMIN — Medication 10 UNIT(S): at 16:49

## 2020-01-05 RX ADMIN — Medication 1: at 16:49

## 2020-01-05 RX ADMIN — Medication 15 MILLIGRAM(S): at 05:59

## 2020-01-05 RX ADMIN — Medication 5 MILLIGRAM(S): at 21:28

## 2020-01-05 RX ADMIN — CLOPIDOGREL BISULFATE 75 MILLIGRAM(S): 75 TABLET, FILM COATED ORAL at 11:46

## 2020-01-05 RX ADMIN — ENOXAPARIN SODIUM 40 MILLIGRAM(S): 100 INJECTION SUBCUTANEOUS at 11:47

## 2020-01-05 RX ADMIN — OXYCODONE AND ACETAMINOPHEN 2 TABLET(S): 5; 325 TABLET ORAL at 17:01

## 2020-01-05 RX ADMIN — Medication 10 UNIT(S): at 13:59

## 2020-01-05 RX ADMIN — RISPERIDONE 0.5 MILLIGRAM(S): 4 TABLET ORAL at 14:01

## 2020-01-05 RX ADMIN — Medication 2: at 10:02

## 2020-01-05 RX ADMIN — Medication 15 MILLIGRAM(S): at 06:30

## 2020-01-05 RX ADMIN — Medication 3 MILLILITER(S): at 18:03

## 2020-01-05 RX ADMIN — PIPERACILLIN AND TAZOBACTAM 25 GRAM(S): 4; .5 INJECTION, POWDER, LYOPHILIZED, FOR SOLUTION INTRAVENOUS at 05:22

## 2020-01-05 RX ADMIN — Medication 10 UNIT(S): at 10:02

## 2020-01-05 RX ADMIN — Medication 3 MILLILITER(S): at 05:36

## 2020-01-05 RX ADMIN — FAMOTIDINE 20 MILLIGRAM(S): 10 INJECTION INTRAVENOUS at 11:46

## 2020-01-05 RX ADMIN — Medication 81 MILLIGRAM(S): at 11:46

## 2020-01-05 NOTE — CONSULT NOTE ADULT - SUBJECTIVE AND OBJECTIVE BOX
Dr. Juarez  Office [phone]  Cell [phone]  [place] JOHN  Cell [phone]    HPI:  59M w/ COPD on 2L NC, DM2 on insulin, CAD s/p stent, HFpEF, pulm HTN, HTN, charted hx of schizophrenia/bipolar disorder, panic disorder, and recent admit for COPD exacerbation d/c 19 presents from [facility] (SNF) for n/v/d and hyperglycemia. Admitted with diabetic ketoacidosis, found to have hyponatremia           Allergies:  No Known Allergies      PAST MEDICAL & SURGICAL HISTORY:  Oxygen dependent  Gastroesophageal reflux disease, esophagitis presence not specified  Smoker  Bipolar 1 disorder  Coronary artery disease involving native coronary artery of native heart without angina pectoris  Type 2 diabetes mellitus without complication, with long-term current use of insulin  Pulmonary HTN  HLD (hyperlipidemia)  Stented coronary artery  COPD (chronic obstructive pulmonary disease)  No significant past surgical history      Home Medications Reviewed    Hospital Medications:   MEDICATIONS  (STANDING):  albuterol/ipratropium for Nebulization 3 milliLiter(s) Nebulizer every 6 hours  aspirin enteric coated 81 milliGRAM(s) Oral daily  atorvastatin 40 milliGRAM(s) Oral at bedtime  budesonide 160 MICROgram(s)/formoterol 4.5 MICROgram(s) Inhaler 2 Puff(s) Inhalation two times a day  chlorhexidine 4% Liquid 1 Application(s) Topical <User Schedule>  clopidogrel Tablet 75 milliGRAM(s) Oral daily  dextrose 5%. 1000 milliLiter(s) (50 mL/Hr) IV Continuous <Continuous>  dextrose 50% Injectable 12.5 Gram(s) IV Push once  dextrose 50% Injectable 25 Gram(s) IV Push once  dextrose 50% Injectable 25 Gram(s) IV Push once  enoxaparin Injectable 40 milliGRAM(s) SubCutaneous daily  famotidine    Tablet 20 milliGRAM(s) Oral daily  furosemide    Tablet 20 milliGRAM(s) Oral daily  insulin glargine Injectable (LANTUS) 40 Unit(s) SubCutaneous at bedtime  insulin lispro (HumaLOG) corrective regimen sliding scale   SubCutaneous three times a day before meals  insulin lispro (HumaLOG) corrective regimen sliding scale   SubCutaneous at bedtime  insulin lispro Injectable (HumaLOG) 10 Unit(s) SubCutaneous three times a day with meals  melatonin 5 milliGRAM(s) Oral at bedtime  risperiDONE   Tablet 0.5 milliGRAM(s) Oral three times a day      SOCIAL HISTORY:  Denies ETOh, Smoking,     FAMILY HISTORY:  No family history of mental disorder  No family history of hypertension  No family history of chronic obstructive pulmonary disease  No family history of cardiovascular disease      REVIEW OF SYSTEMS:  CONSTITUTIONAL: No weakness, fevers or chills  EYES/ENT: No visual changes;  No vertigo or throat pain   NECK: No pain or stiffness  RESPIRATORY: No cough, wheezing, hemoptysis; No shortness of breath  CARDIOVASCULAR: No chest pain or palpitations.  GASTROINTESTINAL: as per HPI  GENITOURINARY: No dysuria, frequency, foamy urine, urinary urgency, incontinence or hematuria  NEUROLOGICAL: No numbness or weakness  SKIN: No itching, burning, rashes, or lesions   VASCULAR: No bilateral lower extremity edema.   All other review of systems is negative unless indicated above.    VITALS:  T(F): 98.1 (20 @ 14:52), Max: 98.2 (20 @ 01:27)  HR: 87 (20 @ 18:05)  BP: 123/74 (20 @ 14:52)  RR: 18 (20 @ 14:52)  SpO2: 98% (20 @ 18:05)  Wt(kg): --     @ 07:  -   @ 07:00  --------------------------------------------------------  IN: 550 mL / OUT: 1600 mL / NET: -1050 mL     @ 07:  -   @ 20:50  --------------------------------------------------------  IN: 0 mL / OUT: 300 mL / NET: -300 mL          PHYSICAL EXAM:  Constitutional: NAD  HEENT: anicteric sclera, oropharynx clear, MMM  Neck: No JVD  Respiratory: Bilateral exp ronchi +  Cardiovascular: S1, S2, RRR  Gastrointestinal: BS+, soft, NT/ND  Extremities: No cyanosis or clubbing. No peripheral edema  Neurological: A/O x 3, no focal deficits  Psychiatric: Normal mood, normal affect  : No CVA tenderness. No mark.   Skin: No rashes       LABS:      138  |  95<L>  |  14  ----------------------------<  199<H>  3.8   |  27  |  0.67    Ca    7.9<L>      2020 06:48  Phos  2.5       Mg     1.7         TPro  5.7<L>  /  Alb  3.4  /  TBili  0.3  /  DBili      /  AST  21  /  ALT  28  /  AlkPhos  53      Creatinine Trend: 0.67 <--, 0.61 <--, 0.70 <--, 0.81 <--                        11.7   7.68  )-----------( 160      ( 2020 05:09 )             36.8     Urine Studies:  Urinalysis Basic - ( 2020 22:18 )    Color: Light Yellow / Appearance: Clear / S.035 / pH:   Gluc:  / Ketone: Small  / Bili: Negative / Urobili: Negative   Blood:  / Protein: Negative / Nitrite: Negative   Leuk Esterase: Negative / RBC: 11 /hpf / WBC 0 /HPF   Sq Epi:  / Non Sq Epi: 0 /hpf / Bacteria: Negative      Osmolality, Random Urine: 591 mosm/Kg ( @ 00:40)  Sodium, Random Urine: <35 mmol/L ( @ 22:45)      RADIOLOGY & ADDITIONAL STUDIES:

## 2020-01-05 NOTE — PROGRESS NOTE ADULT - SUBJECTIVE AND OBJECTIVE BOX
Patient seen and examined at bedside  No new acute events noted overnight  Case discussed with medical team    HPI:  59M w/ COPD on 2L NC, DM2 on insulin, CAD s/p stent, HFpEF, pulm HTN, HTN, charted hx of schizophrenia/bipolar disorder, panic disorder, and recent admit for COPD exacerbation d/c 19 presents from [facility] ([facility]) for n/v/d and hyperglycemia. Patient reports that over last 3d he has been experiencing chills and sweating w/ nausea and NBNB vomit with loose non-bloody stool. He also says his sugar was elevated but does not provide additional hx other than on ROS- no fever, cp, palpitations, sob, cough, abdominal pain, rash, joint pain or swelling, wounds or trauma. History otherwise collected from charted.    In the ED, VS 98.1, 116/75, 130, 25 97% 4L NC -? placed on BiPAP and the eventually weaned to 3L NC  s/p lantus 60Ux1 (15:43), humalog 10U x1 SQ(14:40), insulin regular 6U IV(15:46), calcium gluconate 2g (15:45), acetaminophen [id number], 3L LR, duoneb 3mL x3, zosyn (16:22) (2020 04:08)      PAST MEDICAL & SURGICAL HISTORY:  Oxygen dependent  Gastroesophageal reflux disease, esophagitis presence not specified  Smoker  Bipolar 1 disorder  Coronary artery disease involving native coronary artery of native heart without angina pectoris  Type 2 diabetes mellitus without complication, with long-term current use of insulin  Pulmonary HTN  HLD (hyperlipidemia)  Stented coronary artery  COPD (chronic obstructive pulmonary disease)  No significant past surgical history      No Known Allergies       MEDICATIONS  (STANDING):  albuterol/ipratropium for Nebulization 3 milliLiter(s) Nebulizer every 6 hours  aspirin enteric coated 81 milliGRAM(s) Oral daily  atorvastatin 40 milliGRAM(s) Oral at bedtime  budesonide 160 MICROgram(s)/formoterol 4.5 MICROgram(s) Inhaler 2 Puff(s) Inhalation two times a day  chlorhexidine 4% Liquid 1 Application(s) Topical <User Schedule>  clopidogrel Tablet 75 milliGRAM(s) Oral daily  dextrose 5%. 1000 milliLiter(s) (50 mL/Hr) IV Continuous <Continuous>  dextrose 50% Injectable 12.5 Gram(s) IV Push once  dextrose 50% Injectable 25 Gram(s) IV Push once  dextrose 50% Injectable 25 Gram(s) IV Push once  enoxaparin Injectable 40 milliGRAM(s) SubCutaneous daily  famotidine    Tablet 20 milliGRAM(s) Oral daily  insulin glargine Injectable (LANTUS) 40 Unit(s) SubCutaneous at bedtime  insulin lispro (HumaLOG) corrective regimen sliding scale   SubCutaneous three times a day before meals  insulin lispro (HumaLOG) corrective regimen sliding scale   SubCutaneous at bedtime  insulin lispro Injectable (HumaLOG) 10 Unit(s) SubCutaneous three times a day with meals  melatonin 5 milliGRAM(s) Oral at bedtime  risperiDONE   Tablet 0.5 milliGRAM(s) Oral three times a day    MEDICATIONS  (PRN):  ALPRAZolam 0.5 milliGRAM(s) Oral every 6 hours PRN panic attack/anxiety  dextrose 40% Gel 15 Gram(s) Oral once PRN Blood Glucose LESS THAN 70 milliGRAM(s)/deciliter  glucagon  Injectable 1 milliGRAM(s) IntraMuscular once PRN Glucose LESS THAN 70 milligrams/deciliter  oxycodone    5 mG/acetaminophen 325 mG 2 Tablet(s) Oral every 6 hours PRN Moderate Pain (4 - 6)      REVIEW OF SYSTEMS:  CONSTITUTIONAL: (+) malaise.   EYES: No acute change in vision   ENT:  No tinnitus  NECK: No stiffness  RESPIRATORY: No hemoptysis  CARDIOVASCULAR: No chest pain, palpitations, syncope  GASTROINTESTINAL: No hematemesis, diarrhea, melena, or hematochezia.  GENITOURINARY: No hematuria  NEUROLOGICAL: No headaches  LYMPH Nodes: No enlarged glands  ENDOCRINE: No heat or cold intolerance	    T(C): 36.8 (20 @ 09:54), Max: 36.8 (20 @ 20:47)  HR: 90 (20 @ 12:34) (88 - 96)  BP: 107/73 (20 @ 09:54) (96/57 - 120/81)  RR: 18 (20 @ 09:54) (16 - 18)  SpO2: 97% (20 @ 12:34) (94% - 97%)    PHYSICAL EXAMINATION:   Constitutional: WD, NAD  HEENT: NC, AT  Neck:  Supple  Respiratory:  Adequate airflow b/l. Not using accessory muscles of respiration.  Cardiovascular:  S1 & S2 intact, no R/G, 2+ radial pulses b/l  Gastrointestinal: Soft, NT, ND, normoactive b.s., no organomegaly/RT/rigidity  Extremities: WWP  Neurological:  Alert and awake.  No acute focal motor deficits. Crude sensation intact.     Labs and imaging reviewed    LABS:                        11.7   7.68  )-----------( 160      ( 2020 05:09 )             36.8     01-05    138  |  95<L>  |  14  ----------------------------<  199<H>  3.8   |  27  |  0.67    Ca    7.9<L>      2020 06:48  Phos  2.5     01-04  Mg     1.7     -04    TPro  5.7<L>  /  Alb  3.4  /  TBili  0.3  /  DBili  x   /  AST  21  /  ALT  28  /  AlkPhos  53  01-04        PT/INR - ( 2020 03:44 )   PT: 12.4 sec;   INR: 1.08 ratio         PTT - ( 2020 03:44 )  PTT:26.1 sec  Urinalysis Basic - ( 2020 22:18 )    Color: Light Yellow / Appearance: Clear / S.035 / pH: x  Gluc: x / Ketone: Small  / Bili: Negative / Urobili: Negative   Blood: x / Protein: Negative / Nitrite: Negative   Leuk Esterase: Negative / RBC: 11 /hpf / WBC 0 /HPF   Sq Epi: x / Non Sq Epi: 0 /hpf / Bacteria: Negative      CAPILLARY BLOOD GLUCOSE      POCT Blood Glucose.: 248 mg/dL (2020 13:50)  POCT Blood Glucose.: 211 mg/dL (2020 09:57)  POCT Blood Glucose.: 161 mg/dL (2020 21:38)  POCT Blood Glucose.: 183 mg/dL (2020 17:46)        LIVER FUNCTIONS - ( 2020 03:47 )  Alb: 3.4 g/dL / Pro: 5.7 g/dL / ALK PHOS: 53 U/L / ALT: 28 U/L / AST: 21 U/L / GGT: x           ABG - ( 2020 06:53 )  pH, Arterial: 7.41  pH, Blood: x     /  pCO2: 46    /  pO2: 80    / HCO3: 29    / Base Excess: 4.2   /  SaO2: 97                  RADIOLOGY & ADDITIONAL STUDIES:

## 2020-01-05 NOTE — PROGRESS NOTE ADULT - SUBJECTIVE AND OBJECTIVE BOX
Patient is a 59y old  Male who presents with a chief complaint of 59M w/ n/v/d and hyperglycemia sent in from [facility] (2020 18:33)      Any change in ROS: Having back pain and wants percocet    MEDICATIONS  (STANDING):  albuterol/ipratropium for Nebulization 3 milliLiter(s) Nebulizer every 6 hours  aspirin enteric coated 81 milliGRAM(s) Oral daily  atorvastatin 40 milliGRAM(s) Oral at bedtime  budesonide 160 MICROgram(s)/formoterol 4.5 MICROgram(s) Inhaler 2 Puff(s) Inhalation two times a day  chlorhexidine 4% Liquid 1 Application(s) Topical <User Schedule>  clopidogrel Tablet 75 milliGRAM(s) Oral daily  dextrose 5%. 1000 milliLiter(s) (50 mL/Hr) IV Continuous <Continuous>  dextrose 50% Injectable 12.5 Gram(s) IV Push once  dextrose 50% Injectable 25 Gram(s) IV Push once  dextrose 50% Injectable 25 Gram(s) IV Push once  enoxaparin Injectable 40 milliGRAM(s) SubCutaneous daily  famotidine    Tablet 20 milliGRAM(s) Oral daily  insulin glargine Injectable (LANTUS) 40 Unit(s) SubCutaneous at bedtime  insulin lispro (HumaLOG) corrective regimen sliding scale   SubCutaneous three times a day before meals  insulin lispro (HumaLOG) corrective regimen sliding scale   SubCutaneous at bedtime  insulin lispro Injectable (HumaLOG) 10 Unit(s) SubCutaneous three times a day with meals  melatonin 5 milliGRAM(s) Oral at bedtime  piperacillin/tazobactam IVPB.. 3.375 Gram(s) IV Intermittent every 8 hours  risperiDONE   Tablet 0.5 milliGRAM(s) Oral three times a day    MEDICATIONS  (PRN):  ALPRAZolam 0.25 milliGRAM(s) Oral every 8 hours PRN panic attack  dextrose 40% Gel 15 Gram(s) Oral once PRN Blood Glucose LESS THAN 70 milliGRAM(s)/deciliter  glucagon  Injectable 1 milliGRAM(s) IntraMuscular once PRN Glucose LESS THAN 70 milligrams/deciliter    Vital Signs Last 24 Hrs  T(C): 36.8 (2020 09:54), Max: 36.8 (2020 20:47)  T(F): 98.2 (2020 09:54), Max: 98.2 (2020 20:47)  HR: 92 (2020 09:54) (88 - 96)  BP: 107/73 (2020 09:54) (96/57 - 120/81)  BP(mean): --  RR: 18 (2020 09:54) (16 - 18)  SpO2: 96% (2020 09:54) (94% - 96%)    I&O's Summary    2020 07:01  -  2020 07:00  --------------------------------------------------------  IN: 550 mL / OUT: 1600 mL / NET: -1050 mL          Physical Exam:   GENERAL: NAD, well-groomed, well-developed  HEENT: GALO/   Atraumatic, Normocephalic  ENMT: No tonsillar erythema, exudates, or enlargement; Moist mucous membranes, Good dentition, No lesions  NECK: Supple, No JVD, Normal thyroid  CHEST/LUNG: Clear to auscultaion  CVS: Regular rate and rhythm; No murmurs, rubs, or gallops  GI: : Soft, Nontender, Nondistended; Bowel sounds present  NERVOUS SYSTEM:  Alert & Oriented X3  EXTREMITIES:  2+ Peripheral Pulses, No clubbing, cyanosis, or edema  LYMPH: No lymphadenopathy noted  SKIN: No rashes or lesions  ENDOCRINOLOGY: No Thyromegaly  PSYCH: Appropriate    Labs:  ABG - ( 2020 06:53 )  pH, Arterial: 7.41  pH, Blood: x     /  pCO2: 46    /  pO2: 80    / HCO3: 29    / Base Excess: 4.2   /  SaO2: 97              UNK, 30, 28                            11.7   7.68  )-----------( 160      ( 2020 05:09 )             36.8                         11.9   7.91  )-----------( 157      ( 2020 03:44 )             36.6                         14.4   13.90 )-----------( 196      ( 2020 13:50 )             46.1     01-05    138  |  95<L>  |  14  ----------------------------<  199<H>  3.8   |  27  |  0.67  01-04    129<L>  |  93<L>  |  14  ----------------------------<  301<H>  3.9   |  27  |  0.61  01-03    132<L>  |  89<L>  |  22  ----------------------------<  452<HH>  4.6   |  25  |  0.70  01-03    126<L>  |  89<L>  |  30<H>  ----------------------------<  631<HH>  5.9<H>   |  22  |  0.81    Ca    7.9<L>      2020 06:48  Ca    8.5      2020 03:47  Ca    8.9      2020 17:56  Ca    8.9      2020 13:50  Phos  2.5     01-04  Mg     1.7     01-04    TPro  5.7<L>  /  Alb  3.4  /  TBili  0.3  /  DBili  x   /  AST  21  /  ALT  28  /  AlkPhos  53  01-04  TPro  7.4  /  Alb  4.3  /  TBili  0.6  /  DBili  x   /  AST  16  /  ALT  20  /  AlkPhos  74  01-03    CAPILLARY BLOOD GLUCOSE      POCT Blood Glucose.: 211 mg/dL (2020 09:57)  POCT Blood Glucose.: 161 mg/dL (2020 21:38)  POCT Blood Glucose.: 183 mg/dL (2020 17:46)  POCT Blood Glucose.: 209 mg/dL (2020 13:06)      LIVER FUNCTIONS - ( 2020 03:47 )  Alb: 3.4 g/dL / Pro: 5.7 g/dL / ALK PHOS: 53 U/L / ALT: 28 U/L / AST: 21 U/L / GGT: x           PT/INR - ( 2020 03:44 )   PT: 12.4 sec;   INR: 1.08 ratio         PTT - ( 2020 03:44 )  PTT:26.1 sec  Urinalysis Basic - ( 2020 22:18 )    Color: Light Yellow / Appearance: Clear / S.035 / pH: x  Gluc: x / Ketone: Small  / Bili: Negative / Urobili: Negative   Blood: x / Protein: Negative / Nitrite: Negative   Leuk Esterase: Negative / RBC: 11 /hpf / WBC 0 /HPF   Sq Epi: x / Non Sq Epi: 0 /hpf / Bacteria: Negative            RECENT CULTURES:   @ 00:37 .Urine Clean Catch (Midstream)                No growth     @ 18:03 .Blood Blood-Peripheral     < from: CT Chest No Cont (20 @ 04:06) >  INTERPRETATION:  CLINICAL INFORMATION: History of COPD presenting with severe sepsis and hypoxia.    COMPARISON: Chest CT dated 12/3/2019. CT abdomen pelvis dated 1/3/2020.    PROCEDURE:   CT of the Chest was performed without intravenous contrast.  Sagittal and coronal reformats were performed.      FINDINGS:    LUNGS AND AIRWAYS: Patent central airways. Centrilobular emphysema. Bibasilar subsegmental atelectasis. No focal consolidation or parenchymal abnormality.    PLEURA: No pleural effusion.    MEDIASTINUM AND [place]: No lymphadenopathy.    VESSELS: Atherosclerotic calcifications of the aorta and coronary arteries. Enlarged main pulmonary artery measuring 4.2 cm, compatible with pulmonary arterial hypertension.    HEART: Heart size is normal. No pericardial effusion.    CHEST WALL AND LOWER NECK: Bilateral gynecomastia.    VISUALIZED UPPER ABDOMEN: Stable 1.5 cm left adrenal nodule.    BONES: Healed bilateral rib fractures. Degenerative changes of the spine.    IMPRESSION:     Emphysema.    No lung mass or consolidation.                    LES GIBSON M.D., RADIOLOGY RESIDENT  This document has been electronically signed.  MAYDA GRIFFITH M.D., ATTENDING RADIOLOGIST  This document has been electronically signed. 2020 10:01AM        < end of copied text >             No growth to date.          RESPIRATORY CULTURES:          Studies  Chest X-RAY  CT SCAN Chest   Venous Dopplers: LE:   CT Abdomen  Others

## 2020-01-05 NOTE — CONSULT NOTE ADULT - ASSESSMENT
# SIRS( Tachycardia + Tachypnea)       would recommend:    1. Follow up final blood cultures  2. Supplemental oxygenation and bronchodilator  as needed  3. Management of Hyperglycemia as per Endocrinology    will follow the patient with you and make further recommendation based on the clinical course and Lab results  Thank you for the opportunity to participate in Mr. Sumner's care Patient is a 59y old  Male with schizophrenia/bipolar disorder, panic disorder, and recent admit for RSV and COPD exacerbation, d/c 12/12/19,  Now sent in to the ER  from [facility] (SNF) for evaluation of n/v/d and hyperglycemia. Patient reports that over last 3d he has been experiencing chills and sweating w/ nausea and NBNB vomit with loose non-bloody stool.  ON admission, he found to have  tachycardia,  tachypnea and 97% 4L NC -? placed on BiPAP and the eventually weaned to 3L NC.  The Labs is significant for Hyperglycemia, BS of 631 and hyponatremia, NA of 129. He is s/p lantus, (15:43), humalog 10U x1 SQ(14:40), insulin regular 6U IV(15:46), calcium gluconate 2g (15:45),  3L LR, duoneb 3mL x3, zosyn (16:22). The ID consult requested to assist with further evaluation and antibiotic management.     # SIRS( Tachycardia + Tachypnea) - CXR is negative  # Hyperglycemia     would recommend:  1. Follow up final blood cultures  2. Supplemental oxygenation and bronchodilator  as needed  3. Management of Hyperglycemia as per Endocrinology/Primary   4. Obtain Procalcitonin level     d/w  patient and Nursing staff    will follow the patient with you and make further recommendation based on the clinical course and Lab results  Thank you for the opportunity to participate in Mr. Sumner's care

## 2020-01-05 NOTE — PROGRESS NOTE ADULT - PROBLEM SELECTOR PLAN 1
improved  c/w dm rx, hydration, education provided to the patient, d/c abx as no infection noted, case management for safe d/c arrangements

## 2020-01-05 NOTE — PROGRESS NOTE ADULT - ASSESSMENT
Assessment  DMT2: 59y Male with DM T2 with hyperglycemia, was on large dose insulin at home, started on basal bolus insulin, adjusted dose yesterday, blood sugars improving though still running high, no hypoglycemic episode, eating meals, appears comfortable.  CAD: on medications, no chest pain, stable, monitored.  HTN: Controlled,  on antihypertensive medications.  Overweight/Obesity: No strict exercise routines, not on any weight loss program, neither on low calorie diet.          Laury Romero MD  Cell: [phone]  Office: [phone] Assessment  DMT2: 59y Male with DM T2 with hyperglycemia, was on large dose insulin at home, started on basal bolus insulin, adjusted dose yesterday, blood sugars improving though still running high, no hypoglycemic episode,  eating meals, appears comfortable.  CAD: on medications, no chest pain, stable, monitored.  HTN: Controlled,  on antihypertensive medications.  Overweight/Obesity: No strict exercise routines, not on any weight loss program, neither on low calorie diet.          Laury Romero MD  Cell: [phone]  Office: [phone]

## 2020-01-05 NOTE — PROGRESS NOTE ADULT - SUBJECTIVE AND OBJECTIVE BOX
Chief complaint  Patient is a 59y old  Male who presents with a chief complaint of 59M w/ n/v/d and hyperglycemia sent in from [facility] (05 Jan 2020 13:57)   Review of systems  Patient in bed, looks comfortable, no fever, no hypoglycemia.    Labs and Fingersticks  CAPILLARY BLOOD GLUCOSE      POCT Blood Glucose.: 248 mg/dL (05 Jan 2020 13:50)  POCT Blood Glucose.: 211 mg/dL (05 Jan 2020 09:57)  POCT Blood Glucose.: 161 mg/dL (04 Jan 2020 21:38)  POCT Blood Glucose.: 183 mg/dL (04 Jan 2020 17:46)      Anion Gap, Serum: 16 (01-05 @ 06:48)  Anion Gap, Serum: 9 (01-04 @ 03:47)  Anion Gap, Serum: 18 <H> (01-03 @ 17:56)      Calcium, Total Serum: 7.9 <L> (01-05 @ 06:48)  Calcium, Total Serum: 8.5 (01-04 @ 03:47)  Calcium, Total Serum: 8.9 (01-03 @ 17:56)  Albumin, Serum: 3.4 (01-04 @ 03:47)    Alanine Aminotransferase (ALT/SGPT): 28 (01-04 @ 03:47)  Alkaline Phosphatase, Serum: 53 (01-04 @ 03:47)  Aspartate Aminotransferase (AST/SGOT): 21 (01-04 @ 03:47)        01-05    138  |  95<L>  |  14  ----------------------------<  199<H>  3.8   |  27  |  0.67    Ca    7.9<L>      05 Jan 2020 06:48  Phos  2.5     01-04  Mg     1.7     01-04    TPro  5.7<L>  /  Alb  3.4  /  TBili  0.3  /  DBili  x   /  AST  21  /  ALT  28  /  AlkPhos  53  01-04                        11.7   7.68  )-----------( 160      ( 04 Jan 2020 05:09 )             36.8     Medications  MEDICATIONS  (STANDING):  albuterol/ipratropium for Nebulization 3 milliLiter(s) Nebulizer every 6 hours  aspirin enteric coated 81 milliGRAM(s) Oral daily  atorvastatin 40 milliGRAM(s) Oral at bedtime  budesonide 160 MICROgram(s)/formoterol 4.5 MICROgram(s) Inhaler 2 Puff(s) Inhalation two times a day  chlorhexidine 4% Liquid 1 Application(s) Topical <User Schedule>  clopidogrel Tablet 75 milliGRAM(s) Oral daily  dextrose 5%. 1000 milliLiter(s) (50 mL/Hr) IV Continuous <Continuous>  dextrose 50% Injectable 12.5 Gram(s) IV Push once  dextrose 50% Injectable 25 Gram(s) IV Push once  dextrose 50% Injectable 25 Gram(s) IV Push once  enoxaparin Injectable 40 milliGRAM(s) SubCutaneous daily  famotidine    Tablet 20 milliGRAM(s) Oral daily  furosemide    Tablet 20 milliGRAM(s) Oral daily  insulin glargine Injectable (LANTUS) 40 Unit(s) SubCutaneous at bedtime  insulin lispro (HumaLOG) corrective regimen sliding scale   SubCutaneous three times a day before meals  insulin lispro (HumaLOG) corrective regimen sliding scale   SubCutaneous at bedtime  insulin lispro Injectable (HumaLOG) 10 Unit(s) SubCutaneous three times a day with meals  melatonin 5 milliGRAM(s) Oral at bedtime  risperiDONE   Tablet 0.5 milliGRAM(s) Oral three times a day      Physical Exam  General: Patient comfortable in bed  Vital Signs Last 12 Hrs  T(F): 98.1 (01-05-20 @ 14:52), Max: 98.2 (01-05-20 @ 09:54)  HR: 98 (01-05-20 @ 14:52) (90 - 98)  BP: 123/74 (01-05-20 @ 14:52) (104/67 - 123/74)  BP(mean): --  RR: 18 (01-05-20 @ 14:52) (16 - 18)  SpO2: 94% (01-05-20 @ 14:52) (94% - 97%)  Neck: No palpable thyroid nodules.  CVS: S1S2, No murmurs  Respiratory: No wheezing, no crepitations  GI: Abdomen soft, bowel sounds positive  Musculoskeletal:  edema lower extremities.   Skin: No skin rashes, no ecchymosis    Diagnostics Chief complaint  Patient is a 59y old  Male who presents with a chief complaint of 59M w/ n/v/d and hyperglycemia sent in from [facility] (05 Jan 2020 13:57)   Review of systems  Patient in bed, looks comfortable, no fever,  no hypoglycemia.    Labs and Fingersticks  CAPILLARY BLOOD GLUCOSE      POCT Blood Glucose.: 248 mg/dL (05 Jan 2020 13:50)  POCT Blood Glucose.: 211 mg/dL (05 Jan 2020 09:57)  POCT Blood Glucose.: 161 mg/dL (04 Jan 2020 21:38)  POCT Blood Glucose.: 183 mg/dL (04 Jan 2020 17:46)      Anion Gap, Serum: 16 (01-05 @ 06:48)  Anion Gap, Serum: 9 (01-04 @ 03:47)  Anion Gap, Serum: 18 <H> (01-03 @ 17:56)      Calcium, Total Serum: 7.9 <L> (01-05 @ 06:48)  Calcium, Total Serum: 8.5 (01-04 @ 03:47)  Calcium, Total Serum: 8.9 (01-03 @ 17:56)  Albumin, Serum: 3.4 (01-04 @ 03:47)    Alanine Aminotransferase (ALT/SGPT): 28 (01-04 @ 03:47)  Alkaline Phosphatase, Serum: 53 (01-04 @ 03:47)  Aspartate Aminotransferase (AST/SGOT): 21 (01-04 @ 03:47)        01-05    138  |  95<L>  |  14  ----------------------------<  199<H>  3.8   |  27  |  0.67    Ca    7.9<L>      05 Jan 2020 06:48  Phos  2.5     01-04  Mg     1.7     01-04    TPro  5.7<L>  /  Alb  3.4  /  TBili  0.3  /  DBili  x   /  AST  21  /  ALT  28  /  AlkPhos  53  01-04                        11.7   7.68  )-----------( 160      ( 04 Jan 2020 05:09 )             36.8     Medications  MEDICATIONS  (STANDING):  albuterol/ipratropium for Nebulization 3 milliLiter(s) Nebulizer every 6 hours  aspirin enteric coated 81 milliGRAM(s) Oral daily  atorvastatin 40 milliGRAM(s) Oral at bedtime  budesonide 160 MICROgram(s)/formoterol 4.5 MICROgram(s) Inhaler 2 Puff(s) Inhalation two times a day  chlorhexidine 4% Liquid 1 Application(s) Topical <User Schedule>  clopidogrel Tablet 75 milliGRAM(s) Oral daily  dextrose 5%. 1000 milliLiter(s) (50 mL/Hr) IV Continuous <Continuous>  dextrose 50% Injectable 12.5 Gram(s) IV Push once  dextrose 50% Injectable 25 Gram(s) IV Push once  dextrose 50% Injectable 25 Gram(s) IV Push once  enoxaparin Injectable 40 milliGRAM(s) SubCutaneous daily  famotidine    Tablet 20 milliGRAM(s) Oral daily  furosemide    Tablet 20 milliGRAM(s) Oral daily  insulin glargine Injectable (LANTUS) 40 Unit(s) SubCutaneous at bedtime  insulin lispro (HumaLOG) corrective regimen sliding scale   SubCutaneous three times a day before meals  insulin lispro (HumaLOG) corrective regimen sliding scale   SubCutaneous at bedtime  insulin lispro Injectable (HumaLOG) 10 Unit(s) SubCutaneous three times a day with meals  melatonin 5 milliGRAM(s) Oral at bedtime  risperiDONE   Tablet 0.5 milliGRAM(s) Oral three times a day      Physical Exam  General: Patient comfortable in bed  Vital Signs Last 12 Hrs  T(F): 98.1 (01-05-20 @ 14:52), Max: 98.2 (01-05-20 @ 09:54)  HR: 98 (01-05-20 @ 14:52) (90 - 98)  BP: 123/74 (01-05-20 @ 14:52) (104/67 - 123/74)  BP(mean): --  RR: 18 (01-05-20 @ 14:52) (16 - 18)  SpO2: 94% (01-05-20 @ 14:52) (94% - 97%)  Neck: No palpable thyroid nodules.  CVS: S1S2, No murmurs  Respiratory: No wheezing, no crepitations  GI: Abdomen soft, bowel sounds positive  Musculoskeletal:  edema lower extremities.   Skin: No skin rashes, no ecchymosis    Diagnostics

## 2020-01-05 NOTE — CONSULT NOTE ADULT - SUBJECTIVE AND OBJECTIVE BOX
Patient is a 59y old  Male who presents with a chief complaint of 59M w/ n/v/d and hyperglycemia sent in from [facility] (2020 20:45)          REVIEW OF SYSTEMS: Total of twelve systems have been reviewed with patient and found to be negative unless mentioned in HPI        PAST MEDICAL & SURGICAL HISTORY:  Oxygen dependent  Gastroesophageal reflux disease, esophagitis presence not specified  Smoker  Bipolar 1 disorder  Coronary artery disease involving native coronary artery of native heart without angina pectoris  Type 2 diabetes mellitus without complication, with long-term current use of insulin  Pulmonary HTN  HLD (hyperlipidemia)  Stented coronary artery  COPD (chronic obstructive pulmonary disease)  No significant past surgical history        SOCIAL HISTORY  Alcohol: Does not drink  Tobacco: Does not smoke  Illicit substance use: None      FAMILY HISTORY: Non contributory to the present illness        ALLERGIES: No Known Allergies        Vital Signs Last 24 Hrs  T(C): 36.7 (2020 21:28), Max: 36.8 (2020 01:27)  T(F): 98 (2020 21:28), Max: 98.2 (2020 01:27)  HR: 87 (2020 21:28) (87 - 98)  BP: 120/78 (2020 21:28) (104/67 - 123/74)  BP(mean): --  RR: 18 (2020 21:28) (16 - 18)  SpO2: 98% (2020 21:28) (94% - 98%)        PHYSICAL EXAM:  GENERAL: Not in distress   CHEST/LUNG:  Aire ntry bilaterally  HEART: s1 and s2 present  ABDOMEN:  Nontender and  Nondistended  EXTREMITIES: No pedal  edema  CNS: Awake and Alert      LABS:                        11.7   7.68  )-----------( 160      ( 2020 05:09 )             36.8       01-05    138  |  95<L>  |  14  ----------------------------<  199<H>  3.8   |  27  |  0.67    Ca    7.9<L>      2020 06:48  Phos  2.5     01-04  Mg     1.7     01-04    TPro  5.7<L>  /  Alb  3.4  /  TBili  0.3  /  DBili  x   /  AST  21  /  ALT  28  /  AlkPhos  53  01-04    PT/INR - ( 2020 03:44 )   PT: 12.4 sec;   INR: 1.08 ratio          CAPILLARY BLOOD GLUCOSE  POCT Blood Glucose.: 191 mg/dL (2020 16:45)  POCT Blood Glucose.: 248 mg/dL (2020 13:50)  POCT Blood Glucose.: 211 mg/dL (2020 09:57)      ABG - ( 2020 06:53 )  pH, Arterial: 7.41  pH, Blood: x     /  pCO2: 46    /  pO2: 80    / HCO3: 29    / Base Excess: 4.2   /  SaO2: 97                  Urinalysis Basic - ( 2020 22:18 )    Color: Light Yellow / Appearance: Clear / S.035 / pH: x  Gluc: x / Ketone: Small  / Bili: Negative / Urobili: Negative   Blood: x / Protein: Negative / Nitrite: Negative   Leuk Esterase: Negative / RBC: 11 /hpf / WBC 0 /HPF   Sq Epi: x / Non Sq Epi: 0 /hpf / Bacteria: Negative        MEDICATIONS  (STANDING):  albuterol/ipratropium for Nebulization 3 milliLiter(s) Nebulizer every 6 hours  aspirin enteric coated 81 milliGRAM(s) Oral daily  atorvastatin 40 milliGRAM(s) Oral at bedtime  budesonide 160 MICROgram(s)/formoterol 4.5 MICROgram(s) Inhaler 2 Puff(s) Inhalation two times a day  chlorhexidine 4% Liquid 1 Application(s) Topical <User Schedule>  clopidogrel Tablet 75 milliGRAM(s) Oral daily  dextrose 5%. 1000 milliLiter(s) (50 mL/Hr) IV Continuous <Continuous>  dextrose 50% Injectable 12.5 Gram(s) IV Push once  dextrose 50% Injectable 25 Gram(s) IV Push once  dextrose 50% Injectable 25 Gram(s) IV Push once  enoxaparin Injectable 40 milliGRAM(s) SubCutaneous daily  famotidine    Tablet 20 milliGRAM(s) Oral daily  furosemide    Tablet 20 milliGRAM(s) Oral daily  insulin glargine Injectable (LANTUS) 40 Unit(s) SubCutaneous at bedtime  insulin lispro (HumaLOG) corrective regimen sliding scale   SubCutaneous three times a day before meals  insulin lispro (HumaLOG) corrective regimen sliding scale   SubCutaneous at bedtime  insulin lispro Injectable (HumaLOG) 10 Unit(s) SubCutaneous three times a day with meals  melatonin 5 milliGRAM(s) Oral at bedtime  risperiDONE   Tablet 0.5 milliGRAM(s) Oral three times a day    MEDICATIONS  (PRN):  ALPRAZolam 0.5 milliGRAM(s) Oral every 6 hours PRN panic attack/anxiety  dextrose 40% Gel 15 Gram(s) Oral once PRN Blood Glucose LESS THAN 70 milliGRAM(s)/deciliter  glucagon  Injectable 1 milliGRAM(s) IntraMuscular once PRN Glucose LESS THAN 70 milligrams/deciliter  oxycodone    5 mG/acetaminophen 325 mG 2 Tablet(s) Oral every 6 hours PRN Moderate Pain (4 - 6)          RADIOLOGY & ADDITIONAL TESTS:      < from: CT Chest No Cont (20 @ 04:06) >    No lung mass or consolidation.    < end of copied text > Patient is a 59y old  Male with schizophrenia/bipolar disorder, panic disorder, and recent admit for RSV and COPD exacerbation, d/c 19,  Now sent in to the ER  from [facility] ([facility]) for evaluation of n/v/d and hyperglycemia. Patient reports that over last 3d he has been experiencing chills and sweating w/ nausea and NBNB vomit with loose non-bloody stool.  ON admission, he found to have  tachycardia,  tachypnea and 97% 4L NC -? placed on BiPAP and the eventually weaned to 3L NC.  The Labs is significant for Hyperglycemia, BS of 631 and hyponatremia, NA of 129. He is s/p lantus, (15:43), humalog 10U x1 SQ(14:40), insulin regular 6U IV(15:46), calcium gluconate 2g (15:45),  3L LR, duoneb 3mL x3, zosyn (16:22). The ID consult requested to assist with further evaluation and antibiotic management.         REVIEW OF SYSTEMS: Total of twelve systems have been reviewed with patient and found to be negative unless mentioned in HPI        PAST MEDICAL & SURGICAL HISTORY:  Oxygen dependent  Gastroesophageal reflux disease, esophagitis presence not specified  Smoker  Bipolar 1 disorder  Coronary artery disease involving native coronary artery of native heart without angina pectoris  Type 2 diabetes mellitus without complication, with long-term current use of insulin  Pulmonary HTN  HLD (hyperlipidemia)  Stented coronary artery  COPD (chronic obstructive pulmonary disease)  No significant past surgical history        SOCIAL HISTORY  Alcohol: Does not drink  Tobacco: Does not smoke  Illicit substance use: None      FAMILY HISTORY: Non contributory to the present illness        ALLERGIES: No Known Allergies        Vital Signs Last 24 Hrs  T(C): 36.7 (2020 21:28), Max: 36.8 (2020 01:27)  T(F): 98 (2020 21:28), Max: 98.2 (2020 01:27)  HR: 87 (2020 21:28) (87 - 98)  BP: 120/78 (2020 21:28) (104/67 - 123/74)  BP(mean): --  RR: 18 (2020 21:28) (16 - 18)  SpO2: 98% (2020 21:28) (94% - 98%)        PHYSICAL EXAM:  GENERAL: Not in distress   CHEST/LUNG:  Aire ntry bilaterally  HEART: s1 and s2 present  ABDOMEN:  Nontender and  Nondistended  EXTREMITIES: No pedal  edema  CNS: Awake and Alert        LABS:                        11.7   7.68  )-----------( 160      ( 2020 05:09 )             36.8         01-05    138  |  95<L>  |  14  ----------------------------<  199<H>  3.8   |  27  |  0.67    Ca    7.9<L>      2020 06:48  Phos  2.5     -04  Mg     1.7     -04    TPro  5.7<L>  /  Alb  3.4  /  TBili  0.3  /  DBili  x   /  AST  21  /  ALT  28  /  AlkPhos  53  01-04    PT/INR - ( 2020 03:44 )   PT: 12.4 sec;   INR: 1.08 ratio          CAPILLARY BLOOD GLUCOSE  POCT Blood Glucose.: 191 mg/dL (2020 16:45)  POCT Blood Glucose.: 248 mg/dL (2020 13:50)  POCT Blood Glucose.: 211 mg/dL (2020 09:57)      ABG - ( 2020 06:53 )  pH, Arterial: 7.41  pH, Blood: x     /  pCO2: 46    /  pO2: 80    / HCO3: 29    / Base Excess: 4.2   /  SaO2: 97            Urinalysis Basic - ( 2020 22:18 )  Color: Light Yellow / Appearance: Clear / S.035 / pH: x  Gluc: x / Ketone: Small  / Bili: Negative / Urobili: Negative   Blood: x / Protein: Negative / Nitrite: Negative   Leuk Esterase: Negative / RBC: 11 /hpf / WBC 0 /HPF   Sq Epi: x / Non Sq Epi: 0 /hpf / Bacteria: Negative        MEDICATIONS  (STANDING):  albuterol/ipratropium for Nebulization 3 milliLiter(s) Nebulizer every 6 hours  aspirin enteric coated 81 milliGRAM(s) Oral daily  atorvastatin 40 milliGRAM(s) Oral at bedtime  budesonide 160 MICROgram(s)/formoterol 4.5 MICROgram(s) Inhaler 2 Puff(s) Inhalation two times a day  chlorhexidine 4% Liquid 1 Application(s) Topical <User Schedule>  clopidogrel Tablet 75 milliGRAM(s) Oral daily  dextrose 5%. 1000 milliLiter(s) (50 mL/Hr) IV Continuous <Continuous>  dextrose 50% Injectable 12.5 Gram(s) IV Push once  dextrose 50% Injectable 25 Gram(s) IV Push once  dextrose 50% Injectable 25 Gram(s) IV Push once  enoxaparin Injectable 40 milliGRAM(s) SubCutaneous daily  famotidine    Tablet 20 milliGRAM(s) Oral daily  furosemide    Tablet 20 milliGRAM(s) Oral daily  insulin glargine Injectable (LANTUS) 40 Unit(s) SubCutaneous at bedtime  insulin lispro (HumaLOG) corrective regimen sliding scale   SubCutaneous three times a day before meals  insulin lispro (HumaLOG) corrective regimen sliding scale   SubCutaneous at bedtime  insulin lispro Injectable (HumaLOG) 10 Unit(s) SubCutaneous three times a day with meals  melatonin 5 milliGRAM(s) Oral at bedtime  risperiDONE   Tablet 0.5 milliGRAM(s) Oral three times a day    MEDICATIONS  (PRN):  ALPRAZolam 0.5 milliGRAM(s) Oral every 6 hours PRN panic attack/anxiety  dextrose 40% Gel 15 Gram(s) Oral once PRN Blood Glucose LESS THAN 70 milliGRAM(s)/deciliter  glucagon  Injectable 1 milliGRAM(s) IntraMuscular once PRN Glucose LESS THAN 70 milligrams/deciliter  oxycodone    5 mG/acetaminophen 325 mG 2 Tablet(s) Oral every 6 hours PRN Moderate Pain (4 - 6)          RADIOLOGY & ADDITIONAL TESTS:      < from: CT Chest No Cont (20 @ 04:06) >    No lung mass or consolidation.

## 2020-01-05 NOTE — CONSULT NOTE ADULT - ASSESSMENT
59M w/ COPD on 2L NC, DM2 on insulin, CAD s/p stent, HFpEF, pulm HTN, HTN, charted hx of schizophrenia/bipolar disorder, panic disorder, and recent admit for COPD exacerbation d/c 12/12/19 presents from [facility] (SNF) for n/v/d and hyperglycemia. Admitted with diabetic ketoacidosis, found to have hyponatremia    A/P:    Hyponatremia:  Likely sec to hyperglycemia/dehydration  Work up suggest dehydration  Better with hydration and better glucose control  Monitor Na level at present  Continue lasix at present in view of h/o CHF-Diastolic  Needs better control of diabetes    HTN:  Controlled   Monitor at present

## 2020-01-06 RX ORDER — ACETAMINOPHEN 500 MG
650 TABLET ORAL EVERY 6 HOURS
Refills: 0 | Status: DISCONTINUED | OUTPATIENT
Start: 2020-01-06 | End: 2020-03-13

## 2020-01-06 RX ORDER — SIMETHICONE 80 MG/1
80 TABLET, CHEWABLE ORAL EVERY 12 HOURS
Refills: 0 | Status: DISCONTINUED | OUTPATIENT
Start: 2020-01-06 | End: 2020-01-11

## 2020-01-06 RX ADMIN — Medication 20 MILLIGRAM(S): at 05:40

## 2020-01-06 RX ADMIN — OXYCODONE AND ACETAMINOPHEN 2 TABLET(S): 5; 325 TABLET ORAL at 18:16

## 2020-01-06 RX ADMIN — OXYCODONE AND ACETAMINOPHEN 2 TABLET(S): 5; 325 TABLET ORAL at 19:00

## 2020-01-06 RX ADMIN — Medication 0.5 MILLIGRAM(S): at 20:13

## 2020-01-06 RX ADMIN — ENOXAPARIN SODIUM 40 MILLIGRAM(S): 100 INJECTION SUBCUTANEOUS at 11:40

## 2020-01-06 RX ADMIN — Medication 81 MILLIGRAM(S): at 11:40

## 2020-01-06 RX ADMIN — Medication 1: at 09:35

## 2020-01-06 RX ADMIN — Medication 5 MILLIGRAM(S): at 21:41

## 2020-01-06 RX ADMIN — RISPERIDONE 0.5 MILLIGRAM(S): 4 TABLET ORAL at 13:00

## 2020-01-06 RX ADMIN — INSULIN GLARGINE 40 UNIT(S): 100 INJECTION, SOLUTION SUBCUTANEOUS at 22:30

## 2020-01-06 RX ADMIN — Medication 3 MILLILITER(S): at 23:21

## 2020-01-06 RX ADMIN — SIMETHICONE 80 MILLIGRAM(S): 80 TABLET, CHEWABLE ORAL at 15:44

## 2020-01-06 RX ADMIN — Medication 3 MILLILITER(S): at 17:16

## 2020-01-06 RX ADMIN — Medication 3 MILLILITER(S): at 05:54

## 2020-01-06 RX ADMIN — Medication 3 MILLILITER(S): at 12:32

## 2020-01-06 RX ADMIN — Medication 10 UNIT(S): at 09:34

## 2020-01-06 RX ADMIN — Medication 2: at 18:57

## 2020-01-06 RX ADMIN — Medication 10 UNIT(S): at 18:58

## 2020-01-06 RX ADMIN — OXYCODONE AND ACETAMINOPHEN 2 TABLET(S): 5; 325 TABLET ORAL at 06:20

## 2020-01-06 RX ADMIN — OXYCODONE AND ACETAMINOPHEN 2 TABLET(S): 5; 325 TABLET ORAL at 13:00

## 2020-01-06 RX ADMIN — RISPERIDONE 0.5 MILLIGRAM(S): 4 TABLET ORAL at 05:40

## 2020-01-06 RX ADMIN — BUDESONIDE AND FORMOTEROL FUMARATE DIHYDRATE 2 PUFF(S): 160; 4.5 AEROSOL RESPIRATORY (INHALATION) at 05:55

## 2020-01-06 RX ADMIN — OXYCODONE AND ACETAMINOPHEN 2 TABLET(S): 5; 325 TABLET ORAL at 05:40

## 2020-01-06 RX ADMIN — BUDESONIDE AND FORMOTEROL FUMARATE DIHYDRATE 2 PUFF(S): 160; 4.5 AEROSOL RESPIRATORY (INHALATION) at 17:17

## 2020-01-06 RX ADMIN — Medication 10 UNIT(S): at 12:56

## 2020-01-06 RX ADMIN — ATORVASTATIN CALCIUM 40 MILLIGRAM(S): 80 TABLET, FILM COATED ORAL at 21:41

## 2020-01-06 RX ADMIN — CHLORHEXIDINE GLUCONATE 1 APPLICATION(S): 213 SOLUTION TOPICAL at 15:46

## 2020-01-06 RX ADMIN — FAMOTIDINE 20 MILLIGRAM(S): 10 INJECTION INTRAVENOUS at 11:42

## 2020-01-06 RX ADMIN — OXYCODONE AND ACETAMINOPHEN 2 TABLET(S): 5; 325 TABLET ORAL at 12:18

## 2020-01-06 RX ADMIN — OXYCODONE AND ACETAMINOPHEN 2 TABLET(S): 5; 325 TABLET ORAL at 00:00

## 2020-01-06 RX ADMIN — CLOPIDOGREL BISULFATE 75 MILLIGRAM(S): 75 TABLET, FILM COATED ORAL at 11:40

## 2020-01-06 RX ADMIN — RISPERIDONE 0.5 MILLIGRAM(S): 4 TABLET ORAL at 21:41

## 2020-01-06 NOTE — DIETITIAN INITIAL EVALUATION ADULT. - PERTINENT LABORATORY DATA
(01/05) Na 138, Calcium 7.9, Potassium 3.8, A1C 10.4 (12/04)  Finger Sticks:  (01/06) 178  (01/05) 184-248  (01/04) 161-271  (01/03) [phone]

## 2020-01-06 NOTE — DIETITIAN INITIAL EVALUATION ADULT. - SIGNS/SYMPTOMS
as evidenced by BMI of 35.1, dietary recall, unwillingness to apply nutrition recommendations as evidenced by BMI of 35., weight gain and dietary recall as evidenced by DKA, elevated A1C, dietary recall, unwillingness to apply nutrition recommendations as evidenced by BMI of 35.1, weight gain and ordering out of meals in addition to planned meals

## 2020-01-06 NOTE — PROGRESS NOTE ADULT - PROBLEM SELECTOR PLAN 1
improved  c/w dm rx, hydration, education provided to the patient,   d/c mark and attempt TOV  -> Case management for safe d/c arrangements

## 2020-01-06 NOTE — DIETITIAN INITIAL EVALUATION ADULT. - OTHER INFO
As per chart, 58 y/o M with PMHx of COPD, T2DM, CAD s/p stent, HFpEF, pulmonary HTN, HLD, HTN, hx of schizophrenia, bipolar disorder, panic disorder admitted with nausea, vomiting and diarrhea with hyperglycemia from [facility] ([facility]). Found to have mild DKA and severe sepsis.     Diet PTA: Pt reports good appetite and intake PTA. Pt states he was at [facility] ([facility]) x 6months and was on a consistent carbohydrate diet with snacks. Pt states dietary recall of meals at [facility] consisting of Spanish toast/pancakes, cereal, omelets/eggs, and sausages for breakfast, with roast beef/turkey, mash potatoes, peas and carrots for lunch and dinner. Pt admits to ordering/eating out frequently while at the [facility] as portion sizes of meals were "too small." Pt endorses he would order heros, pizzas, pasta, and chinese food. Pt reports he had decreased appetite since admission (01/03) secondary to episodes of N+V and diarrhea.     Intake: Pt states his appetite has improved today since admission, currently tolerating consistent carbohydrate diet with intake now at 80% (as per Pt). Observed breakfast tray consumed at ~%, Pt reports eating eggs with cheese, 2 slices of toast and a cup of coffee this morning. Pt states no complaints with menu or meals at visit. Pt informed on menu procedures and consistent carbohydrate intake at meals.     Weight Hx: Pt states UBW of 189 lbs. 6 months ago. Pt states he's been eating a lot and that he's been gaining weight and thinks he is at 219 lbs. Pt currently has dosing weight of 231 lbs., indicating a 18.2% (42 lbs.) weight gain within the last 6 months.    Pt reports: Pt reports no N+V, diarrhea/constipation since Saturday (01/04); last BM was Saturday night (01/04). Pt denies use of nutritional supplementation or micronutrient supplementation PTA. Pt reports he uses long lasting insulin to help manage his diabetes and would check his BG 3x day PTA. Pt made aware of elevated BG ranges and A1C. At the [facility], Pt states the staff would not administer enough insulin or check his BG regularly. Pt reports having received diabetes education ~10 years ago and declined further education when offered.     Education: Pt informed of menu procedures and consistent carbohydrate intake at meals. Pt made aware of excessive carbohydrate/energy intake and relation to elevated BG. Pt declined any further diabetes education. As per chart, 58 y/o M with PMHx of COPD, T2DM, CAD s/p stent, HFpEF, pulmonary HTN, HLD, HTN, hx of schizophrenia, bipolar disorder, panic disorder admitted with nausea, vomiting and diarrhea with hyperglycemia from [facility] ([facility]). Found to have mild DKA and severe sepsis.     Diet PTA: Pt reports good appetite and intake PTA. Pt states he was at [facility] ([facility]) x 6months and was on a consistent carbohydrate diet with snacks. Pt states dietary recall of meals at [facility] consisting of Thai toast/pancakes, cereal, omelets/eggs, and sausages for breakfast, with roast beef/turkey, mash potatoes, peas and carrots for lunch and dinner. Pt admits to ordering/eating out frequently while at the [facility] as portion sizes of meals were "too small." Pt endorses he would order heros, pizzas, pasta, and chinese food. Pt reports he had decreased appetite since admission (01/03) secondary to episodes of N+V and diarrhea.     Intake: Pt states his appetite has improved today since admission, currently tolerating consistent carbohydrate diet with intake now at 80% (as per Pt). Observed breakfast tray consumed at ~%, Pt reports eating eggs with cheese, 2 slices of toast and a cup of coffee this morning. Pt states no complaints with menu or meals at visit. Pt informed on menu procedures and consistent carbohydrate intake at meals. Spoke to NP.    Weight Hx: Pt states UBW of 189 lbs. 6 months ago. Pt states he's been eating a lot and that he's been gaining weight and thinks he is at 219 lbs. Pt currently has dosing weight of 231 lbs., indicating a 18.2% (42 lbs.) weight gain within the last 6 months.    Pt reports: Pt reports no N+V, diarrhea/constipation since Saturday (01/04); last BM was Saturday night (01/04). Pt denies use of nutritional supplementation or micronutrient supplementation PTA. Pt reports he uses long lasting insulin to help manage his diabetes and would check his BG 3x day PTA. Pt made aware of elevated BG ranges and A1C. At the [facility], Pt states the staff would not administer enough insulin or check his BG regularly. Pt reports having received diabetes education ~10 years ago and declined further education when offered.     Education: Pt informed of menu procedures and consistent carbohydrate intake at meals. Pt made aware of excessive carbohydrate/energy intake and relation to elevated BG. Pt declined any further diabetes education. As per chart, 58 y/o M with PMHx of COPD, T2DM, CAD s/p stent, HFpEF, pulmonary HTN, HLD, HTN, hx of schizophrenia, bipolar disorder, panic disorder admitted with nausea, vomiting and diarrhea with hyperglycemia from [facility] ([facility]). Found to have mild DKA and severe sepsis.     Diet PTA: Pt reports good appetite and intake PTA. Pt states he was at [facility] ([facility]) x 6months and was on a consistent carbohydrate diet with snacks. Pt states dietary recall of meals at [facility] consisting of Uzbek toast/pancakes, cereal, omelets/eggs, and sausages for breakfast, with roast beef/turkey, mash potatoes, peas and carrots for lunch and dinner. Pt admits to ordering/eating out frequently while at the [facility] as portion sizes of meals were "too small." Pt endorses he would order heros, pizzas, pasta, and chinese food; partially explains elevated A1C. Pt reports he had decreased appetite since admission (01/03) secondary to episodes of N+V and diarrhea.     Intake: Pt states his appetite has improved today since admission, currently tolerating consistent carbohydrate diet with intake now at 80% (as per Pt). Observed breakfast tray consumed at ~%, Pt reports eating eggs with cheese, 2 slices of toast and a cup of coffee this morning. Pt states no complaints with menu or meals at visit. Pt informed on menu procedures and consistent carbohydrate intake at meals. Spoke to NP.    Weight Hx: Pt states UBW of 189 lbs. 6 months ago. Pt states he's been eating a lot and that he's been gaining weight and thinks he is at 219 lbs. Pt currently has dosing weight of 231 lbs., indicating a 18.2% (42 lbs.) weight gain within the last 6 months.    Pt reports: Pt reports no N+V, diarrhea/constipation since Saturday (01/04); last BM was Saturday night (01/04). Pt denies use of nutritional supplementation or micronutrient supplementation PTA. Pt reports he uses long lasting insulin to help manage his diabetes and would check his BG 3x day PTA. Pt made aware of elevated BG ranges and A1C and importance of monitoring BG. At the [facility], Pt states the staff would not administer enough insulin or check his BG regularly. Pt reports having received diabetes education ~10 years ago and declined written education when offered.     Education: Pt informed of menu procedures and consistent carbohydrate intake at meals. Pt made aware of excessive carbohydrate/energy intake and relation to elevated BG. Pt declined any further diabetes education.

## 2020-01-06 NOTE — PROGRESS NOTE ADULT - ASSESSMENT
Patient is a 59y old  Male with schizophrenia/bipolar disorder, panic disorder, and recent admit for RSV and COPD exacerbation, d/c 12/12/19,  Now sent in to the ER  from [facility] ([facility]) for evaluation of n/v/d and hyperglycemia. Patient reports that over last 3d he has been experiencing chills and sweating w/ nausea and NBNB vomit with loose non-bloody stool.  ON admission, he found to have  tachycardia,  tachypnea and 97% 4L NC -? placed on BiPAP and the eventually weaned to 3L NC.  The Labs is significant for Hyperglycemia, BS of 631 and hyponatremia, NA of 129. He is s/p lantus, (15:43), humalog 10U x1 SQ(14:40), insulin regular 6U IV(15:46), calcium gluconate 2g (15:45),  3L LR, duoneb 3mL x3, zosyn (16:22). The ID consult requested to assist with further evaluation and antibiotic management.     # SIRS( Tachycardia + Tachypnea) - CXR is negative  # Hyperglycemia     would recommend:    1.  Obtain Procalcitonin level   2. Supplemental oxygenation and bronchodilator  as needed  3. Management of Hyperglycemia as per Endocrinology/Primary   4. Monitor OFf Abx for now    d/w  patient and Nursing staff    will follow the patient with you Patient is a 59y old  Male with schizophrenia/bipolar disorder, panic disorder, and recent admit for RSV and COPD exacerbation, d/c 12/12/19,  Now sent in to the ER  from [facility] (SNF) for evaluation of n/v/d and hyperglycemia. Patient reports that over last 3d he has been experiencing chills and sweating w/ nausea and NBNB vomit with loose non-bloody stool.  ON admission, he found to have  tachycardia,  tachypnea and 97% 4L NC -? placed on BiPAP and the eventually weaned to 3L NC.  The Labs is significant for Hyperglycemia, BS of 631 and hyponatremia, NA of 129. He is s/p lantus, (15:43), humalog 10U x1 SQ(14:40), insulin regular 6U IV(15:46), calcium gluconate 2g (15:45),  3L LR, duoneb 3mL x3, zosyn (16:22). The ID consult requested to assist with further evaluation and antibiotic management.     # SIRS( Tachycardia + Tachypnea) - CXR is negative  # Hyperglycemia   # Low Procalcitonin level    would recommend:    1. Monitor OFf Abx   2. Supplemental oxygenation and bronchodilator  as needed  3. Management of Hyperglycemia as per Endocrinology/Primary   4. OOB to chair    d/w  patient and Nursing staff    will follow the patient with you

## 2020-01-06 NOTE — DIETITIAN INITIAL EVALUATION ADULT. - PROBLEM SELECTOR PLAN 10
#L adrenal nodule- per day hospitalist  to further evaluate. not suspected part of acute presentation of above at time of admit.  #anemia- repeat cbc w/ decreased Hb. no signs of hemorrhage on exam. Suspect dilutional from IV fluid resuscitation by ED. repeat cbc with type and screen. will likely need to trend in afternoon as it will take time for Hb to equilibrate  Transitions of Care Status:  1.  Name of PCP: Dr. Romero?  2.  PCP Contacted on Admission: [ ] Y    [ X] N    3.  PCP contacted at Discharge: [ ] Y    [ ] N    [ ] N/A  4.  Post-Discharge Appointment Date and Location:  5.  Summary of Handoff given to PCP:  ==========================  istop [id number]  Rx Written	Rx Dispensed	Drug	Quantity	Days Supply	Prescriber Name  12/13/2019	12/27/2019	alprazolam 0.25 mg tablet	15	15	Rajwinder Aparicio  12/23/2019	12/23/2019	oxycodone-acetaminophen 5-325 mg tablet	20	5	Elvis Evans

## 2020-01-06 NOTE — PROGRESS NOTE ADULT - SUBJECTIVE AND OBJECTIVE BOX
Patient seen and examined at bedside  No new acute events noted overnight  Case discussed with medical team    HPI:  59M w/ COPD on 2L NC, DM2 on insulin, CAD s/p stent, HFpEF, pulm HTN, HTN, charted hx of schizophrenia/bipolar disorder, panic disorder, and recent admit for COPD exacerbation d/c 12/12/19 presents from [facility] ([facility]) for n/v/d and hyperglycemia. Patient reports that over last 3d he has been experiencing chills and sweating w/ nausea and NBNB vomit with loose non-bloody stool. He also says his sugar was elevated but does not provide additional hx other than on ROS- no fever, cp, palpitations, sob, cough, abdominal pain, rash, joint pain or swelling, wounds or trauma. History otherwise collected from charted.    In the ED, VS 98.1, 116/75, 130, 25 97% 4L NC -? placed on BiPAP and the eventually weaned to 3L NC  s/p lantus 60Ux1 (15:43), humalog 10U x1 SQ(14:40), insulin regular 6U IV(15:46), calcium gluconate 2g (15:45), acetaminophen 315xtv9, 3L LR, duoneb 3mL x3, zosyn (16:22) (04 Jan 2020 04:08)      PAST MEDICAL & SURGICAL HISTORY:  Oxygen dependent  Gastroesophageal reflux disease, esophagitis presence not specified  Smoker  Bipolar 1 disorder  Coronary artery disease involving native coronary artery of native heart without angina pectoris  Type 2 diabetes mellitus without complication, with long-term current use of insulin  Pulmonary HTN  HLD (hyperlipidemia)  Stented coronary artery  COPD (chronic obstructive pulmonary disease)  No significant past surgical history      No Known Allergies       MEDICATIONS  (STANDING):  albuterol/ipratropium for Nebulization 3 milliLiter(s) Nebulizer every 6 hours  aspirin enteric coated 81 milliGRAM(s) Oral daily  atorvastatin 40 milliGRAM(s) Oral at bedtime  budesonide 160 MICROgram(s)/formoterol 4.5 MICROgram(s) Inhaler 2 Puff(s) Inhalation two times a day  chlorhexidine 4% Liquid 1 Application(s) Topical <User Schedule>  clopidogrel Tablet 75 milliGRAM(s) Oral daily  dextrose 5%. 1000 milliLiter(s) (50 mL/Hr) IV Continuous <Continuous>  dextrose 50% Injectable 12.5 Gram(s) IV Push once  dextrose 50% Injectable 25 Gram(s) IV Push once  dextrose 50% Injectable 25 Gram(s) IV Push once  enoxaparin Injectable 40 milliGRAM(s) SubCutaneous daily  famotidine    Tablet 20 milliGRAM(s) Oral daily  furosemide    Tablet 20 milliGRAM(s) Oral daily  insulin glargine Injectable (LANTUS) 40 Unit(s) SubCutaneous at bedtime  insulin lispro (HumaLOG) corrective regimen sliding scale   SubCutaneous three times a day before meals  insulin lispro (HumaLOG) corrective regimen sliding scale   SubCutaneous at bedtime  insulin lispro Injectable (HumaLOG) 10 Unit(s) SubCutaneous three times a day with meals  melatonin 5 milliGRAM(s) Oral at bedtime  risperiDONE   Tablet 0.5 milliGRAM(s) Oral three times a day    MEDICATIONS  (PRN):  ALPRAZolam 0.5 milliGRAM(s) Oral every 6 hours PRN panic attack/anxiety  dextrose 40% Gel 15 Gram(s) Oral once PRN Blood Glucose LESS THAN 70 milliGRAM(s)/deciliter  glucagon  Injectable 1 milliGRAM(s) IntraMuscular once PRN Glucose LESS THAN 70 milligrams/deciliter  oxycodone    5 mG/acetaminophen 325 mG 2 Tablet(s) Oral every 6 hours PRN Moderate Pain (4 - 6)    REVIEW OF SYSTEMS:  CONSTITUTIONAL: (+) malaise.   EYES: No acute change in vision   ENT:  No tinnitus  NECK: No stiffness  RESPIRATORY: No hemoptysis  CARDIOVASCULAR: No chest pain, palpitations, syncope  GASTROINTESTINAL: No hematemesis, diarrhea, melena, or hematochezia.  GENITOURINARY: No hematuria  NEUROLOGICAL: No headaches  LYMPH Nodes: No enlarged glands  ENDOCRINE: No heat or cold intolerance	    Vital Signs Last 24 Hrs  T(C): 36.4 (06 Jan 2020 13:55), Max: 37 (05 Jan 2020 23:36)  T(F): 97.6 (06 Jan 2020 13:55), Max: 98.6 (05 Jan 2020 23:36)  HR: 92 (06 Jan 2020 13:55) (85 - 98)  BP: 95/65 (06 Jan 2020 13:55) (95/65 - 123/79)  BP(mean): --  RR: 20 (06 Jan 2020 13:55) (16 - 20)  SpO2: 98% (06 Jan 2020 13:55) (94% - 99%)    PHYSICAL EXAMINATION:   Constitutional: WD, NAD  HEENT: NC, AT  Neck:  Supple  Respiratory:  Adequate airflow b/l. Not using accessory muscles of respiration.  Cardiovascular:  S1 & S2 intact, no R/G, 2+ radial pulses b/l  Gastrointestinal: Soft, NT, ND, normoactive b.s., no organomegaly/RT/rigidity  Extremities: WWP  Neurological:  Alert and awake.  No acute focal motor deficits. Crude sensation intact.     Labs and imaging reviewed    LABS:

## 2020-01-06 NOTE — PROGRESS NOTE ADULT - SUBJECTIVE AND OBJECTIVE BOX
Chief complaint  Patient is a 59y old  Male who presents with a chief complaint of 59M w/ n/v/d and hyperglycemia sent in from [facility] (06 Jan 2020 12:36)   Review of systems  Patient in bed, looks comfortable, no fever, no hypoglycemia.    Labs and Fingersticks  CAPILLARY BLOOD GLUCOSE      POCT Blood Glucose.: 145 mg/dL (06 Jan 2020 12:53)  POCT Blood Glucose.: 178 mg/dL (06 Jan 2020 09:31)  POCT Blood Glucose.: 184 mg/dL (05 Jan 2020 22:20)  POCT Blood Glucose.: 191 mg/dL (05 Jan 2020 16:45)  POCT Blood Glucose.: 248 mg/dL (05 Jan 2020 13:50)      Anion Gap, Serum: 16 (01-05 @ 06:48)      Calcium, Total Serum: 7.9 <L> (01-05 @ 06:48)          01-05    138  |  95<L>  |  14  ----------------------------<  199<H>  3.8   |  27  |  0.67    Ca    7.9<L>      05 Jan 2020 06:48      Medications  MEDICATIONS  (STANDING):  albuterol/ipratropium for Nebulization 3 milliLiter(s) Nebulizer every 6 hours  aspirin enteric coated 81 milliGRAM(s) Oral daily  atorvastatin 40 milliGRAM(s) Oral at bedtime  budesonide 160 MICROgram(s)/formoterol 4.5 MICROgram(s) Inhaler 2 Puff(s) Inhalation two times a day  chlorhexidine 4% Liquid 1 Application(s) Topical <User Schedule>  clopidogrel Tablet 75 milliGRAM(s) Oral daily  dextrose 5%. 1000 milliLiter(s) (50 mL/Hr) IV Continuous <Continuous>  dextrose 50% Injectable 12.5 Gram(s) IV Push once  dextrose 50% Injectable 25 Gram(s) IV Push once  dextrose 50% Injectable 25 Gram(s) IV Push once  enoxaparin Injectable 40 milliGRAM(s) SubCutaneous daily  famotidine    Tablet 20 milliGRAM(s) Oral daily  furosemide    Tablet 20 milliGRAM(s) Oral daily  insulin glargine Injectable (LANTUS) 40 Unit(s) SubCutaneous at bedtime  insulin lispro (HumaLOG) corrective regimen sliding scale   SubCutaneous three times a day before meals  insulin lispro (HumaLOG) corrective regimen sliding scale   SubCutaneous at bedtime  insulin lispro Injectable (HumaLOG) 10 Unit(s) SubCutaneous three times a day with meals  melatonin 5 milliGRAM(s) Oral at bedtime  risperiDONE   Tablet 0.5 milliGRAM(s) Oral three times a day      Physical Exam  General: Patient comfortable in bed  Vital Signs Last 12 Hrs  T(F): 98 (01-06-20 @ 03:57), Max: 98 (01-06-20 @ 03:57)  HR: 91 (01-06-20 @ 12:34) (85 - 93)  BP: 123/79 (01-06-20 @ 03:57) (123/79 - 123/79)  BP(mean): --  RR: 20 (01-06-20 @ 03:57) (20 - 20)  SpO2: 98% (01-06-20 @ 12:34) (97% - 99%)  Neck: No palpable thyroid nodules.  CVS: S1S2, No murmurs  Respiratory: No wheezing, no crepitations  GI: Abdomen soft, bowel sounds positive  Musculoskeletal:  edema lower extremities.   Skin: No skin rashes, no ecchymosis    Diagnostics Chief complaint  Patient is a 59y old  Male who presents with a chief complaint of 59M w/ n/v/d and hyperglycemia sent in from [facility] (06 Jan 2020 12:36)   Review of systems  Patient in bed, looks comfortable, no fever,  no hypoglycemia.    Labs and Fingersticks  CAPILLARY BLOOD GLUCOSE      POCT Blood Glucose.: 145 mg/dL (06 Jan 2020 12:53)  POCT Blood Glucose.: 178 mg/dL (06 Jan 2020 09:31)  POCT Blood Glucose.: 184 mg/dL (05 Jan 2020 22:20)  POCT Blood Glucose.: 191 mg/dL (05 Jan 2020 16:45)  POCT Blood Glucose.: 248 mg/dL (05 Jan 2020 13:50)      Anion Gap, Serum: 16 (01-05 @ 06:48)      Calcium, Total Serum: 7.9 <L> (01-05 @ 06:48)          01-05    138  |  95<L>  |  14  ----------------------------<  199<H>  3.8   |  27  |  0.67    Ca    7.9<L>      05 Jan 2020 06:48      Medications  MEDICATIONS  (STANDING):  albuterol/ipratropium for Nebulization 3 milliLiter(s) Nebulizer every 6 hours  aspirin enteric coated 81 milliGRAM(s) Oral daily  atorvastatin 40 milliGRAM(s) Oral at bedtime  budesonide 160 MICROgram(s)/formoterol 4.5 MICROgram(s) Inhaler 2 Puff(s) Inhalation two times a day  chlorhexidine 4% Liquid 1 Application(s) Topical <User Schedule>  clopidogrel Tablet 75 milliGRAM(s) Oral daily  dextrose 5%. 1000 milliLiter(s) (50 mL/Hr) IV Continuous <Continuous>  dextrose 50% Injectable 12.5 Gram(s) IV Push once  dextrose 50% Injectable 25 Gram(s) IV Push once  dextrose 50% Injectable 25 Gram(s) IV Push once  enoxaparin Injectable 40 milliGRAM(s) SubCutaneous daily  famotidine    Tablet 20 milliGRAM(s) Oral daily  furosemide    Tablet 20 milliGRAM(s) Oral daily  insulin glargine Injectable (LANTUS) 40 Unit(s) SubCutaneous at bedtime  insulin lispro (HumaLOG) corrective regimen sliding scale   SubCutaneous three times a day before meals  insulin lispro (HumaLOG) corrective regimen sliding scale   SubCutaneous at bedtime  insulin lispro Injectable (HumaLOG) 10 Unit(s) SubCutaneous three times a day with meals  melatonin 5 milliGRAM(s) Oral at bedtime  risperiDONE   Tablet 0.5 milliGRAM(s) Oral three times a day      Physical Exam  General: Patient comfortable in bed  Vital Signs Last 12 Hrs  T(F): 98 (01-06-20 @ 03:57), Max: 98 (01-06-20 @ 03:57)  HR: 91 (01-06-20 @ 12:34) (85 - 93)  BP: 123/79 (01-06-20 @ 03:57) (123/79 - 123/79)  BP(mean): --  RR: 20 (01-06-20 @ 03:57) (20 - 20)  SpO2: 98% (01-06-20 @ 12:34) (97% - 99%)  Neck: No palpable thyroid nodules.  CVS: S1S2, No murmurs  Respiratory: No wheezing, no crepitations  GI: Abdomen soft, bowel sounds positive  Musculoskeletal:  edema lower extremities.   Skin: No skin rashes, no ecchymosis    Diagnostics

## 2020-01-06 NOTE — PROGRESS NOTE ADULT - ASSESSMENT
59M w/ COPD on 2L NC, DM2 on insulin, CAD s/p stent, HFpEF, pulm HTN, HTN, charted hx of schizophrenia/bipolar disorder, panic disorder, and recent admit for COPD exacerbation d/c 12/12/19 presents from [facility] (SNF) for n/v/d and hyperglycemia. Admitted with diabetic ketoacidosis, found to have hyponatremia    A/P:    Hyponatremia:  Likely sec to hyperglycemia/dehydration  Work up suggest dehydration  Monitor Na level at present  Continue lasix at present in view of h/o CHF-Diastolic  Optimize diabetes    HTN:  Controlled   Monitor at present

## 2020-01-06 NOTE — DIETITIAN INITIAL EVALUATION ADULT. - PHYSICAL APPEARANCE
other (specify)/obese Ht: 68 inches, Current BW: 231 lbs. UBW: 189 lbs.  lbs. IBW%: 150% BMI 35.1 kg/m2  Skin: ecchymotic; no pressure injuries noted as per nursing documentation  Edema: +1 R+L foot

## 2020-01-06 NOTE — PROGRESS NOTE ADULT - ASSESSMENT
Assessment  DMT2: 59y Male with DM T2 with hyperglycemia, was on large dose insulin at home, now on basal bolus insulin, blood sugars improving, no hypoglycemic episode. Patient is eating meals with good appetite, c/o groin pain.  CAD: on medications, no chest pain, stable, monitored.  HTN: Controlled,  on antihypertensive medications.  Overweight/Obesity: No strict exercise routines, not on any weight loss program, neither on low calorie diet.          Laury Romero MD  Cell: [phone]  Office: [phone] Assessment  DMT2: 59y Male with DM T2 with hyperglycemia, was on large dose insulin at home, now on basal bolus insulin, blood sugars improving, no hypoglycemic episode.  Patient is eating meals with good appetite, c/o groin pain.  CAD: on medications, no chest pain, stable, monitored.  HTN: Controlled,  on antihypertensive medications.  Overweight/Obesity: No strict exercise routines, not on any weight loss program, neither on low calorie diet.          Laury Romero MD  Cell: [phone]  Office: [phone]

## 2020-01-06 NOTE — DIETITIAN INITIAL EVALUATION ADULT. - PERTINENT MEDS FT
Lovenox, Dextrose 5 at 50 mL/hr, Lantus, Humalog corrective scale, Lasix, Oxycodone, Budesonide, Pepcid, Lipitor

## 2020-01-06 NOTE — PROGRESS NOTE ADULT - SUBJECTIVE AND OBJECTIVE BOX
Patient is a 59y old  Male who presents with a chief complaint of 59M w/ n/v/d and hyperglycemia sent in from [facility] (05 Jan 2020 21:43)      Any change in ROS: Doing ok : no SOB     MEDICATIONS  (STANDING):  albuterol/ipratropium for Nebulization 3 milliLiter(s) Nebulizer every 6 hours  aspirin enteric coated 81 milliGRAM(s) Oral daily  atorvastatin 40 milliGRAM(s) Oral at bedtime  budesonide 160 MICROgram(s)/formoterol 4.5 MICROgram(s) Inhaler 2 Puff(s) Inhalation two times a day  chlorhexidine 4% Liquid 1 Application(s) Topical <User Schedule>  clopidogrel Tablet 75 milliGRAM(s) Oral daily  dextrose 5%. 1000 milliLiter(s) (50 mL/Hr) IV Continuous <Continuous>  dextrose 50% Injectable 12.5 Gram(s) IV Push once  dextrose 50% Injectable 25 Gram(s) IV Push once  dextrose 50% Injectable 25 Gram(s) IV Push once  enoxaparin Injectable 40 milliGRAM(s) SubCutaneous daily  famotidine    Tablet 20 milliGRAM(s) Oral daily  furosemide    Tablet 20 milliGRAM(s) Oral daily  insulin glargine Injectable (LANTUS) 40 Unit(s) SubCutaneous at bedtime  insulin lispro (HumaLOG) corrective regimen sliding scale   SubCutaneous three times a day before meals  insulin lispro (HumaLOG) corrective regimen sliding scale   SubCutaneous at bedtime  insulin lispro Injectable (HumaLOG) 10 Unit(s) SubCutaneous three times a day with meals  melatonin 5 milliGRAM(s) Oral at bedtime  risperiDONE   Tablet 0.5 milliGRAM(s) Oral three times a day    MEDICATIONS  (PRN):  ALPRAZolam 0.5 milliGRAM(s) Oral every 6 hours PRN panic attack/anxiety  dextrose 40% Gel 15 Gram(s) Oral once PRN Blood Glucose LESS THAN 70 milliGRAM(s)/deciliter  glucagon  Injectable 1 milliGRAM(s) IntraMuscular once PRN Glucose LESS THAN 70 milligrams/deciliter  oxycodone    5 mG/acetaminophen 325 mG 2 Tablet(s) Oral every 6 hours PRN Moderate Pain (4 - 6)    Vital Signs Last 24 Hrs  T(C): 36.7 (06 Jan 2020 03:57), Max: 37 (05 Jan 2020 23:36)  T(F): 98 (06 Jan 2020 03:57), Max: 98.6 (05 Jan 2020 23:36)  HR: 91 (06 Jan 2020 12:34) (85 - 98)  BP: 123/79 (06 Jan 2020 03:57) (102/65 - 123/79)  BP(mean): --  RR: 20 (06 Jan 2020 03:57) (16 - 20)  SpO2: 98% (06 Jan 2020 12:34) (94% - 99%)    I&O's Summary    05 Jan 2020 07:01  -  06 Jan 2020 07:00  --------------------------------------------------------  IN: 200 mL / OUT: 2100 mL / NET: -1900 mL          Physical Exam:   GENERAL: NAD, well-groomed, well-developed  HEENT: GALO/   Atraumatic, Normocephalic  ENMT: No tonsillar erythema, exudates, or enlargement; Moist mucous membranes, Good dentition, No lesions  NECK: Supple, No JVD, Normal thyroid  CHEST/LUNG: Clear to auscultaion, ; No rales, rhonchi, wheezing, or rubs  CVS: Regular rate and rhythm; No murmurs, rubs, or gallops  GI: : Soft, Nontender, Nondistended; Bowel sounds present  NERVOUS SYSTEM:  Alert & Oriented X3  EXTREMITIES:  2+ Peripheral Pulses, No clubbing, cyanosis, or edema  LYMPH: No lymphadenopathy noted  SKIN: No rashes or lesions  ENDOCRINOLOGY: No Thyromegaly  PSYCH: Appropriate    Labs:  ABG - ( 05 Jan 2020 06:53 )  pH, Arterial: 7.41  pH, Blood: x     /  pCO2: 46    /  pO2: 80    / HCO3: 29    / Base Excess: 4.2   /  SaO2: 97              UNK, 30, 28                            11.7   7.68  )-----------( 160      ( 04 Jan 2020 05:09 )             36.8                         11.9   7.91  )-----------( 157      ( 04 Jan 2020 03:44 )             36.6                         14.4   13.90 )-----------( 196      ( 03 Jan 2020 13:50 )             46.1     01-05    138  |  95<L>  |  14  ----------------------------<  199<H>  3.8   |  27  |  0.67  01-04    129<L>  |  93<L>  |  14  ----------------------------<  301<H>  3.9   |  27  |  0.61  01-03    132<L>  |  89<L>  |  22  ----------------------------<  452<HH>  4.6   |  25  |  0.70  01-03    126<L>  |  89<L>  |  30<H>  ----------------------------<  631<HH>  5.9<H>   |  22  |  0.81    Ca    7.9<L>      05 Jan 2020 06:48    TPro  5.7<L>  /  Alb  3.4  /  TBili  0.3  /  DBili  x   /  AST  21  /  ALT  28  /  AlkPhos  53  01-04  TPro  7.4  /  Alb  4.3  /  TBili  0.6  /  DBili  x   /  AST  16  /  ALT  20  /  AlkPhos  74  01-03    CAPILLARY BLOOD GLUCOSE      POCT Blood Glucose.: 178 mg/dL (06 Jan 2020 09:31)  POCT Blood Glucose.: 184 mg/dL (05 Jan 2020 22:20)  POCT Blood Glucose.: 191 mg/dL (05 Jan 2020 16:45)  POCT Blood Glucose.: 248 mg/dL (05 Jan 2020 13:50)      < from: CT Chest No Cont (01.04.20 @ 04:06) >      PROCEDURE DATE:  01/04/2020            INTERPRETATION:  CLINICAL INFORMATION: History of COPD presenting with severe sepsis and hypoxia.    COMPARISON: Chest CT dated 12/3/2019. CT abdomen pelvis dated 1/3/2020.    PROCEDURE:   CT of the Chest was performed without intravenous contrast.  Sagittal and coronal reformats were performed.      FINDINGS:    LUNGS AND AIRWAYS: Patent central airways. Centrilobular emphysema. Bibasilar subsegmental atelectasis. No focal consolidation or parenchymal abnormality.    PLEURA: No pleural effusion.    MEDIASTINUM AND [place]: No lymphadenopathy.    VESSELS: Atherosclerotic calcifications of the aorta and coronary arteries. Enlarged main pulmonary artery measuring 4.2 cm, compatible with pulmonary arterial hypertension.    HEART: Heart size is normal. No pericardial effusion.    CHEST WALL AND LOWER NECK: Bilateral gynecomastia.    VISUALIZED UPPER ABDOMEN: Stable 1.5 cm left adrenal nodule.    BONES: Healed bilateral rib fractures. Degenerative changes of the spine.    IMPRESSION:     Emphysema.    No lung mass or consolidation.                    LES GIBSON M.D., RADIOLOGY RESIDENT  This document has been electronically signed.  MAYDA GRIFFITH M.D., ATTENDING RADIOLOGIST  This document has been electronically signed. Jan 4 2020 10:01AM              < end of copied text >              RECENT CULTURES:  01-04 @ 00:37 .Urine Clean Catch (Midstream)                No growth    01-03 @ 18:03 .Blood Blood-Peripheral                No growth to date.          RESPIRATORY CULTURES:          Studies  Chest X-RAY  CT SCAN Chest   Venous Dopplers: LE:   CT Abdomen  Others

## 2020-01-06 NOTE — PROGRESS NOTE ADULT - SUBJECTIVE AND OBJECTIVE BOX
Patient is seen and examined at the bed side, is afebrile.        REVIEW OF SYSTEMS: All other review systems are negative        ALLERGIES: No Known Allergies        Vital Signs Last 24 Hrs  T(C): 36.4 (2020 13:55), Max: 37 (2020 23:36)  T(F): 97.6 (2020 13:55), Max: 98.6 (2020 23:36)  HR: 88 (2020 17:19) (85 - 93)  BP: 95/65 (2020 13:55) (95/65 - 123/79)  BP(mean): --  RR: 20 (2020 13:55) (16 - 20)  SpO2: 99% (2020 17:19) (95% - 99%)        PHYSICAL EXAM:  GENERAL: Not in distress   CHEST/LUNG:  Air entry bilaterally  HEART: s1 and s2 present  ABDOMEN:  Nontender and  Nondistended  EXTREMITIES: No pedal  edema  CNS: Awake and Alert        LABS: No new Labs                          11.7   7.68  )-----------( 160      ( 2020 05:09 )             36.8       01-05    138  |  95<L>  |  14  ----------------------------<  199<H>  3.8   |  27  |  0.67    Ca    7.9<L>      2020 06:48    TPro  5.7<L>  /  Alb  3.4  /  TBili  0.3  /  DBili  x   /  AST  21  /  ALT  28  /  AlkPhos  53  01-04    PT/INR - ( 2020 03:44 )   PT: 12.4 sec;   INR: 1.08 ratio          CAPILLARY BLOOD GLUCOSE  POCT Blood Glucose.: 191 mg/dL (2020 16:45)  POCT Blood Glucose.: 248 mg/dL (2020 13:50)  POCT Blood Glucose.: 211 mg/dL (2020 09:57)      ABG - ( 2020 06:53 )  pH, Arterial: 7.41  pH, Blood: x     /  pCO2: 46    /  pO2: 80    / HCO3: 29    / Base Excess: 4.2   /  SaO2: 97            Urinalysis Basic - ( 2020 22:18 )  Color: Light Yellow / Appearance: Clear / S.035 / pH: x  Gluc: x / Ketone: Small  / Bili: Negative / Urobili: Negative   Blood: x / Protein: Negative / Nitrite: Negative   Leuk Esterase: Negative / RBC: 11 /hpf / WBC 0 /HPF   Sq Epi: x / Non Sq Epi: 0 /hpf / Bacteria: Negative        MEDICATIONS  (STANDING):  albuterol/ipratropium for Nebulization 3 milliLiter(s) Nebulizer every 6 hours  aspirin enteric coated 81 milliGRAM(s) Oral daily  atorvastatin 40 milliGRAM(s) Oral at bedtime  budesonide 160 MICROgram(s)/formoterol 4.5 MICROgram(s) Inhaler 2 Puff(s) Inhalation two times a day  chlorhexidine 4% Liquid 1 Application(s) Topical <User Schedule>  clopidogrel Tablet 75 milliGRAM(s) Oral daily  enoxaparin Injectable 40 milliGRAM(s) SubCutaneous daily  famotidine    Tablet 20 milliGRAM(s) Oral daily  furosemide    Tablet 20 milliGRAM(s) Oral daily  insulin glargine Injectable (LANTUS) 40 Unit(s) SubCutaneous at bedtime  insulin lispro (HumaLOG) corrective regimen sliding scale   SubCutaneous three times a day before meals  insulin lispro (HumaLOG) corrective regimen sliding scale   SubCutaneous at bedtime  insulin lispro Injectable (HumaLOG) 10 Unit(s) SubCutaneous three times a day with meals  melatonin 5 milliGRAM(s) Oral at bedtime  risperiDONE   Tablet 0.5 milliGRAM(s) Oral three times a day    MEDICATIONS  (PRN):  acetaminophen   Tablet .. 650 milliGRAM(s) Oral every 6 hours PRN Mild Pain (1 - 3)  ALPRAZolam 0.5 milliGRAM(s) Oral every 6 hours PRN panic attack/anxiety  dextrose 40% Gel 15 Gram(s) Oral once PRN Blood Glucose LESS THAN 70 milliGRAM(s)/deciliter  glucagon  Injectable 1 milliGRAM(s) IntraMuscular once PRN Glucose LESS THAN 70 milligrams/deciliter  oxycodone    5 mG/acetaminophen 325 mG 2 Tablet(s) Oral every 6 hours PRN Moderate Pain (4 - 6)  simethicone 80 milliGRAM(s) Chew every 12 hours PRN Indigestion        RADIOLOGY & ADDITIONAL TESTS:      20: CT Chest No Cont (20 @ 04:06) No lung mass or consolidation.          MICROBIOLOGY DATA:    Culture - Urine (20 @ 00:37)    Specimen Source: .Urine Clean Catch (Midstream)    Culture Results:   No growth      Culture - Blood (20 @ 18:03)    Specimen Source: .Blood Blood-Peripheral    Culture Results:   No growth to date.      Culture - Blood (20 @ 18:03)    Specimen Source: .Blood Blood-Peripheral    Culture Results:   No growth to date. Patient is seen and examined at the bed side, is afebrile. He is doing better, the epigastric burning has resolved today.       REVIEW OF SYSTEMS: All other review systems are negative      ALLERGIES: No Known Allergies        Vital Signs Last 24 Hrs  T(C): 36.4 (2020 13:55), Max: 37 (2020 23:36)  T(F): 97.6 (2020 13:55), Max: 98.6 (2020 23:36)  HR: 88 (2020 17:19) (85 - 93)  BP: 95/65 (2020 13:55) (95/65 - 123/79)  BP(mean): --  RR: 20 (2020 13:55) (16 - 20)  SpO2: 99% (2020 17:19) (95% - 99%)        PHYSICAL EXAM:  GENERAL: Not in distress   CHEST/LUNG:  Air entry bilaterally  HEART: s1 and s2 present  ABDOMEN:  Nontender and  Nondistended  EXTREMITIES: No pedal  edema  CNS: Awake and Alert        LABS: No new Labs                          11.7   7.68  )-----------( 160      ( 2020 05:09 )             36.8       01-05    138  |  95<L>  |  14  ----------------------------<  199<H>  3.8   |  27  |  0.67    Ca    7.9<L>      2020 06:48    TPro  5.7<L>  /  Alb  3.4  /  TBili  0.3  /  DBili  x   /  AST  21  /  ALT  28  /  AlkPhos  53  01-04    PT/INR - ( 2020 03:44 )   PT: 12.4 sec;   INR: 1.08 ratio          CAPILLARY BLOOD GLUCOSE  POCT Blood Glucose.: 191 mg/dL (2020 16:45)  POCT Blood Glucose.: 248 mg/dL (2020 13:50)  POCT Blood Glucose.: 211 mg/dL (2020 09:57)      ABG - ( 2020 06:53 )  pH, Arterial: 7.41  pH, Blood: x     /  pCO2: 46    /  pO2: 80    / HCO3: 29    / Base Excess: 4.2   /  SaO2: 97            Urinalysis Basic - ( 2020 22:18 )  Color: Light Yellow / Appearance: Clear / S.035 / pH: x  Gluc: x / Ketone: Small  / Bili: Negative / Urobili: Negative   Blood: x / Protein: Negative / Nitrite: Negative   Leuk Esterase: Negative / RBC: 11 /hpf / WBC 0 /HPF   Sq Epi: x / Non Sq Epi: 0 /hpf / Bacteria: Negative      Procalcitonin, Serum (20 @ 06:38)    Procalcitonin, Serum: 0.18: This assay is intended for use to determine the change of PCT over time  as an aid in assessing the cumulative 28-day risk of all-cause mortality  for patients diagnosed with severe sepsis or septic shock in the ICU, or  when obtained in the emergency department or other medical wards prior to  ICU admission. This assay was performed by the [employer] PCT  assay. ng/mL        MEDICATIONS  (STANDING):  albuterol/ipratropium for Nebulization 3 milliLiter(s) Nebulizer every 6 hours  aspirin enteric coated 81 milliGRAM(s) Oral daily  atorvastatin 40 milliGRAM(s) Oral at bedtime  budesonide 160 MICROgram(s)/formoterol 4.5 MICROgram(s) Inhaler 2 Puff(s) Inhalation two times a day  chlorhexidine 4% Liquid 1 Application(s) Topical <User Schedule>  clopidogrel Tablet 75 milliGRAM(s) Oral daily  enoxaparin Injectable 40 milliGRAM(s) SubCutaneous daily  famotidine    Tablet 20 milliGRAM(s) Oral daily  furosemide    Tablet 20 milliGRAM(s) Oral daily  insulin glargine Injectable (LANTUS) 40 Unit(s) SubCutaneous at bedtime  insulin lispro (HumaLOG) corrective regimen sliding scale   SubCutaneous three times a day before meals  insulin lispro (HumaLOG) corrective regimen sliding scale   SubCutaneous at bedtime  insulin lispro Injectable (HumaLOG) 10 Unit(s) SubCutaneous three times a day with meals  melatonin 5 milliGRAM(s) Oral at bedtime  risperiDONE   Tablet 0.5 milliGRAM(s) Oral three times a day    MEDICATIONS  (PRN):  acetaminophen   Tablet .. 650 milliGRAM(s) Oral every 6 hours PRN Mild Pain (1 - 3)  ALPRAZolam 0.5 milliGRAM(s) Oral every 6 hours PRN panic attack/anxiety  dextrose 40% Gel 15 Gram(s) Oral once PRN Blood Glucose LESS THAN 70 milliGRAM(s)/deciliter  glucagon  Injectable 1 milliGRAM(s) IntraMuscular once PRN Glucose LESS THAN 70 milligrams/deciliter  oxycodone    5 mG/acetaminophen 325 mG 2 Tablet(s) Oral every 6 hours PRN Moderate Pain (4 - 6)  simethicone 80 milliGRAM(s) Chew every 12 hours PRN Indigestion        RADIOLOGY & ADDITIONAL TESTS:      20: CT Chest No Cont (20 @ 04:06) No lung mass or consolidation.          MICROBIOLOGY DATA:    Culture - Urine (20 @ 00:37)    Specimen Source: .Urine Clean Catch (Midstream)    Culture Results:   No growth      Culture - Blood (20 @ 18:03)    Specimen Source: .Blood Blood-Peripheral    Culture Results:   No growth to date.      Culture - Blood (20 @ 18:03)    Specimen Source: .Blood Blood-Peripheral    Culture Results:   No growth to date.

## 2020-01-06 NOTE — DIETITIAN INITIAL EVALUATION ADULT. - ETIOLOGY
related to excessive energy intake and not ready for diet/lifestyle change related to unwilling in reducing energy intake and lack of value for behavior change related to poor diet adherence and not ready for diet/lifestyle change related to poor diet adherence and lack of value for behavior change

## 2020-01-06 NOTE — PROGRESS NOTE ADULT - SUBJECTIVE AND OBJECTIVE BOX
[facility]   MD JONAH SNOW MD RUORU WONG, PA    TEL:  OFFICE: [phone]  DR LUGO CELL: [phone]  PATRIZIA BRISCOE CELL: [phone]  DR. FINCH CELL: [phone]  DR. BARRIOS CELL: [phone]    FROM 5 PM - 7 AM PLEASE CALL ANSWERING SERVICE: [phone]    RENAL FOLLOW UP NOTE  --------------------------------------------------------------------------------  HPI:      Pt seen and examined at bedside.   Denies SOB, chest pain     PAST HISTORY  --------------------------------------------------------------------------------  No significant changes to PMH, PSH, FHx, SHx, unless otherwise noted    ALLERGIES & MEDICATIONS  --------------------------------------------------------------------------------  Allergies    No Known Allergies    Intolerances      Standing Inpatient Medications  albuterol/ipratropium for Nebulization 3 milliLiter(s) Nebulizer every 6 hours  aspirin enteric coated 81 milliGRAM(s) Oral daily  atorvastatin 40 milliGRAM(s) Oral at bedtime  budesonide 160 MICROgram(s)/formoterol 4.5 MICROgram(s) Inhaler 2 Puff(s) Inhalation two times a day  chlorhexidine 4% Liquid 1 Application(s) Topical <User Schedule>  clopidogrel Tablet 75 milliGRAM(s) Oral daily  dextrose 5%. 1000 milliLiter(s) IV Continuous <Continuous>  dextrose 50% Injectable 12.5 Gram(s) IV Push once  dextrose 50% Injectable 25 Gram(s) IV Push once  dextrose 50% Injectable 25 Gram(s) IV Push once  enoxaparin Injectable 40 milliGRAM(s) SubCutaneous daily  famotidine    Tablet 20 milliGRAM(s) Oral daily  furosemide    Tablet 20 milliGRAM(s) Oral daily  insulin glargine Injectable (LANTUS) 40 Unit(s) SubCutaneous at bedtime  insulin lispro (HumaLOG) corrective regimen sliding scale   SubCutaneous three times a day before meals  insulin lispro (HumaLOG) corrective regimen sliding scale   SubCutaneous at bedtime  insulin lispro Injectable (HumaLOG) 10 Unit(s) SubCutaneous three times a day with meals  melatonin 5 milliGRAM(s) Oral at bedtime  risperiDONE   Tablet 0.5 milliGRAM(s) Oral three times a day    PRN Inpatient Medications  ALPRAZolam 0.5 milliGRAM(s) Oral every 6 hours PRN  dextrose 40% Gel 15 Gram(s) Oral once PRN  glucagon  Injectable 1 milliGRAM(s) IntraMuscular once PRN  oxycodone    5 mG/acetaminophen 325 mG 2 Tablet(s) Oral every 6 hours PRN      REVIEW OF SYSTEMS  --------------------------------------------------------------------------------  General: no fever  CVS: no chest pain  RESP:no  sob, no cough  ABD: no abdominal pain  : no dysuria,  MSK: + edema     VITALS/PHYSICAL EXAM  --------------------------------------------------------------------------------  T(C): 36.7 (01-06-20 @ 03:57), Max: 37 (01-05-20 @ 23:36)  HR: 91 (01-06-20 @ 12:34) (85 - 98)  BP: 123/79 (01-06-20 @ 03:57) (102/65 - 123/79)  RR: 20 (01-06-20 @ 03:57) (16 - 20)  SpO2: 98% (01-06-20 @ 12:34) (94% - 99%)  Wt(kg): --        01-05-20 @ 07:01  -  01-06-20 @ 07:00  --------------------------------------------------------  IN: 200 mL / OUT: 2100 mL / NET: -1900 mL      Physical Exam:  	Gen: NAD  	HEENT: MMM  	Pulm: CTA B/L  	CV: S1S2  	Abd: Soft, +BS  	Ext: + LE edema B/L                      Neuro: Awake   	Skin: Warm and Dry    no mark    LABS/STUDIES  --------------------------------------------------------------------------------    138  |  95  |  14  ----------------------------<  199      [01-05-20 @ 06:48]  3.8   |  27  |  0.67        Ca     7.9     [01-05-20 @ 06:48]          Serum Osmolality 286      [01-05-20 @ 09:57]    Creatinine Trend:  SCr 0.67 [01-05 @ 06:48]  SCr 0.61 [01-04 @ 03:47]  SCr 0.70 [01-03 @ 17:56]  SCr 0.81 [01-03 @ 13:50]    Urinalysis - [01-03-20 @ 22:18]      Color Light Yellow / Appearance Clear / SG 1.035 / pH 6.0      Gluc 1000 mg/dL / Ketone Small  / Bili Negative / Urobili Negative       Blood Small / Protein Negative / Leuk Est Negative / Nitrite Negative      RBC 11 / WBC 0 / Hyaline 0 / Gran  / Sq Epi  / Non Sq Epi 0 / Bacteria Negative    Urine Sodium <35      [01-04-20 @ 22:45]  Urine Osmolality 591      [01-05-20 @ 00:40]    HbA1c 10.4      [12-04-19 @ 08:53]

## 2020-01-06 NOTE — PROGRESS NOTE ADULT - PROBLEM SELECTOR PLAN 2
on empiric antibiotics: his all cultures  are negative and his RVP is negative too  1/6: no pna: has emphysema

## 2020-01-06 NOTE — DIETITIAN INITIAL EVALUATION ADULT. - ADD RECOMMEND
1. Continue consistent carbohydrate diet as order; monitor/adjust as needed. 2. Attempted to provide nutrition education on consistent carbohydrate diet, Pt declined education. 3. Monitor diet compliance, PO intake, labs, weight, skin integrity, bowel movement. 4. RD to remain available.

## 2020-01-07 LAB
GLUCOSE BLDC GLUCOMTR-MCNC: 169 MG/DL — HIGH (ref 70–99)
GLUCOSE BLDC GLUCOMTR-MCNC: 184 MG/DL — HIGH (ref 70–99)
GLUCOSE BLDC GLUCOMTR-MCNC: 198 MG/DL — HIGH (ref 70–99)
PROCALCITONIN SERPL-MCNC: 0.18 NG/ML — HIGH (ref 0.02–0.1)

## 2020-01-07 RX ORDER — NICOTINE POLACRILEX 2 MG
1 GUM BUCCAL DAILY
Refills: 0 | Status: DISCONTINUED | OUTPATIENT
Start: 2020-01-07 | End: 2020-02-11

## 2020-01-07 RX ADMIN — OXYCODONE AND ACETAMINOPHEN 2 TABLET(S): 5; 325 TABLET ORAL at 10:00

## 2020-01-07 RX ADMIN — Medication 3 MILLILITER(S): at 12:05

## 2020-01-07 RX ADMIN — Medication 1: at 17:38

## 2020-01-07 RX ADMIN — OXYCODONE AND ACETAMINOPHEN 2 TABLET(S): 5; 325 TABLET ORAL at 01:18

## 2020-01-07 RX ADMIN — Medication 1: at 09:09

## 2020-01-07 RX ADMIN — OXYCODONE AND ACETAMINOPHEN 2 TABLET(S): 5; 325 TABLET ORAL at 22:19

## 2020-01-07 RX ADMIN — Medication 1: at 12:30

## 2020-01-07 RX ADMIN — OXYCODONE AND ACETAMINOPHEN 2 TABLET(S): 5; 325 TABLET ORAL at 15:40

## 2020-01-07 RX ADMIN — Medication 3 MILLILITER(S): at 18:18

## 2020-01-07 RX ADMIN — FAMOTIDINE 20 MILLIGRAM(S): 10 INJECTION INTRAVENOUS at 11:24

## 2020-01-07 RX ADMIN — BUDESONIDE AND FORMOTEROL FUMARATE DIHYDRATE 2 PUFF(S): 160; 4.5 AEROSOL RESPIRATORY (INHALATION) at 05:42

## 2020-01-07 RX ADMIN — CLOPIDOGREL BISULFATE 75 MILLIGRAM(S): 75 TABLET, FILM COATED ORAL at 11:24

## 2020-01-07 RX ADMIN — Medication 81 MILLIGRAM(S): at 11:24

## 2020-01-07 RX ADMIN — Medication 0.5 MILLIGRAM(S): at 22:32

## 2020-01-07 RX ADMIN — INSULIN GLARGINE 40 UNIT(S): 100 INJECTION, SOLUTION SUBCUTANEOUS at 21:50

## 2020-01-07 RX ADMIN — Medication 3 MILLILITER(S): at 05:42

## 2020-01-07 RX ADMIN — RISPERIDONE 0.5 MILLIGRAM(S): 4 TABLET ORAL at 05:07

## 2020-01-07 RX ADMIN — OXYCODONE AND ACETAMINOPHEN 2 TABLET(S): 5; 325 TABLET ORAL at 15:09

## 2020-01-07 RX ADMIN — Medication 10 UNIT(S): at 17:39

## 2020-01-07 RX ADMIN — OXYCODONE AND ACETAMINOPHEN 2 TABLET(S): 5; 325 TABLET ORAL at 21:49

## 2020-01-07 RX ADMIN — ATORVASTATIN CALCIUM 40 MILLIGRAM(S): 80 TABLET, FILM COATED ORAL at 21:51

## 2020-01-07 RX ADMIN — RISPERIDONE 0.5 MILLIGRAM(S): 4 TABLET ORAL at 21:50

## 2020-01-07 RX ADMIN — Medication 10 UNIT(S): at 12:31

## 2020-01-07 RX ADMIN — OXYCODONE AND ACETAMINOPHEN 2 TABLET(S): 5; 325 TABLET ORAL at 09:12

## 2020-01-07 RX ADMIN — RISPERIDONE 0.5 MILLIGRAM(S): 4 TABLET ORAL at 13:47

## 2020-01-07 RX ADMIN — ENOXAPARIN SODIUM 40 MILLIGRAM(S): 100 INJECTION SUBCUTANEOUS at 11:24

## 2020-01-07 RX ADMIN — Medication 3 MILLILITER(S): at 23:23

## 2020-01-07 RX ADMIN — BUDESONIDE AND FORMOTEROL FUMARATE DIHYDRATE 2 PUFF(S): 160; 4.5 AEROSOL RESPIRATORY (INHALATION) at 18:18

## 2020-01-07 RX ADMIN — Medication 5 MILLIGRAM(S): at 21:50

## 2020-01-07 RX ADMIN — OXYCODONE AND ACETAMINOPHEN 2 TABLET(S): 5; 325 TABLET ORAL at 00:18

## 2020-01-07 RX ADMIN — Medication 10 UNIT(S): at 09:09

## 2020-01-07 NOTE — PROGRESS NOTE ADULT - SUBJECTIVE AND OBJECTIVE BOX
Patient is a 59y old  Male who presents with a chief complaint of 59M w/ n/v/d and hyperglycemia sent in from [facility] (07 Jan 2020 09:48)      Any change in ROS: Doing good: no SOB : no wheezing:     MEDICATIONS  (STANDING):  albuterol/ipratropium for Nebulization 3 milliLiter(s) Nebulizer every 6 hours  aspirin enteric coated 81 milliGRAM(s) Oral daily  atorvastatin 40 milliGRAM(s) Oral at bedtime  budesonide 160 MICROgram(s)/formoterol 4.5 MICROgram(s) Inhaler 2 Puff(s) Inhalation two times a day  chlorhexidine 4% Liquid 1 Application(s) Topical <User Schedule>  clopidogrel Tablet 75 milliGRAM(s) Oral daily  dextrose 5%. 1000 milliLiter(s) (50 mL/Hr) IV Continuous <Continuous>  dextrose 50% Injectable 12.5 Gram(s) IV Push once  dextrose 50% Injectable 25 Gram(s) IV Push once  dextrose 50% Injectable 25 Gram(s) IV Push once  enoxaparin Injectable 40 milliGRAM(s) SubCutaneous daily  famotidine    Tablet 20 milliGRAM(s) Oral daily  furosemide    Tablet 20 milliGRAM(s) Oral daily  insulin glargine Injectable (LANTUS) 40 Unit(s) SubCutaneous at bedtime  insulin lispro (HumaLOG) corrective regimen sliding scale   SubCutaneous three times a day before meals  insulin lispro (HumaLOG) corrective regimen sliding scale   SubCutaneous at bedtime  insulin lispro Injectable (HumaLOG) 10 Unit(s) SubCutaneous three times a day with meals  melatonin 5 milliGRAM(s) Oral at bedtime  risperiDONE   Tablet 0.5 milliGRAM(s) Oral three times a day    MEDICATIONS  (PRN):  acetaminophen   Tablet .. 650 milliGRAM(s) Oral every 6 hours PRN Mild Pain (1 - 3)  ALPRAZolam 0.5 milliGRAM(s) Oral every 6 hours PRN panic attack/anxiety  dextrose 40% Gel 15 Gram(s) Oral once PRN Blood Glucose LESS THAN 70 milliGRAM(s)/deciliter  glucagon  Injectable 1 milliGRAM(s) IntraMuscular once PRN Glucose LESS THAN 70 milligrams/deciliter  oxycodone    5 mG/acetaminophen 325 mG 2 Tablet(s) Oral every 6 hours PRN Moderate Pain (4 - 6)  simethicone 80 milliGRAM(s) Chew every 12 hours PRN Indigestion    Vital Signs Last 24 Hrs  T(C): 36.7 (07 Jan 2020 04:10), Max: 36.8 (06 Jan 2020 21:00)  T(F): 98 (07 Jan 2020 04:10), Max: 98.3 (06 Jan 2020 21:00)  HR: 77 (07 Jan 2020 04:10) (77 - 93)  BP: 97/61 (07 Jan 2020 04:10) (95/65 - 115/76)  BP(mean): --  RR: 20 (07 Jan 2020 04:10) (20 - 20)  SpO2: 93% (07 Jan 2020 04:10) (93% - 99%)    I&O's Summary    06 Jan 2020 07:01  -  07 Jan 2020 07:00  --------------------------------------------------------  IN: 1050 mL / OUT: 1200 mL / NET: -150 mL    07 Jan 2020 07:01  -  07 Jan 2020 10:02  --------------------------------------------------------  IN: 0 mL / OUT: 500 mL / NET: -500 mL          Physical Exam:   GENERAL: Nobese+  HEENT: GALO/   Atraumatic, Normocephalic  ENMT: No tonsillar erythema, exudates, or enlargement; Moist mucous membranes, Good dentition, No lesions  NECK: Supple, No JVD, Normal thyroid  CHEST/LUNG: Clear to auscultaion  CVS: Regular rate and rhythm; No murmurs, rubs, or gallops  GI: : Soft, Nontender, Nondistended; Bowel sounds present  NERVOUS SYSTEM:  Alert & Oriented X3  EXTREMITIES:  2+ Peripheral Pulses, No clubbing, cyanosis, or edema  LYMPH: No lymphadenopathy noted  SKIN: No rashes or lesions  ENDOCRINOLOGY: No Thyromegaly  PSYCH: Appropriate    Labs:  UNK, 30, 28                            11.7   7.68  )-----------( 160      ( 04 Jan 2020 05:09 )             36.8                         11.9   7.91  )-----------( 157      ( 04 Jan 2020 03:44 )             36.6                         14.4   13.90 )-----------( 196      ( 03 Jan 2020 13:50 )             46.1     01-05    138  |  95<L>  |  14  ----------------------------<  199<H>  3.8   |  27  |  0.67  01-04    129<L>  |  93<L>  |  14  ----------------------------<  301<H>  3.9   |  27  |  0.61  01-03    132<L>  |  89<L>  |  22  ----------------------------<  452<HH>  4.6   |  25  |  0.70  01-03    126<L>  |  89<L>  |  30<H>  ----------------------------<  631<HH>  5.9<H>   |  22  |  0.81      TPro  5.7<L>  /  Alb  3.4  /  TBili  0.3  /  DBili  x   /  AST  21  /  ALT  28  /  AlkPhos  53  01-04  TPro  7.4  /  Alb  4.3  /  TBili  0.6  /  DBili  x   /  AST  16  /  ALT  20  /  AlkPhos  74  01-03    CAPILLARY BLOOD GLUCOSE      POCT Blood Glucose.: 159 mg/dL (07 Jan 2020 09:06)  POCT Blood Glucose.: 154 mg/dL (06 Jan 2020 22:15)  POCT Blood Glucose.: 203 mg/dL (06 Jan 2020 18:55)  POCT Blood Glucose.: 145 mg/dL (06 Jan 2020 12:53)            Procalcitonin, Serum: 0.18 ng/mL (01-07 @ 06:38)        RECENT CULTURES:  01-04 @ 00:37 .Urine Clean Catch (Midstream)         < from: CT Chest No Cont (01.04.20 @ 04:06) >    LUNGS AND AIRWAYS: Patent central airways. Centrilobular emphysema. Bibasilar subsegmental atelectasis. No focal consolidation or parenchymal abnormality.    PLEURA: No pleural effusion.    MEDIASTINUM AND [place]: No lymphadenopathy.    VESSELS: Atherosclerotic calcifications of the aorta and coronary arteries. Enlarged main pulmonary artery measuring 4.2 cm, compatible with pulmonary arterial hypertension.    HEART: Heart size is normal. No pericardial effusion.    CHEST WALL AND LOWER NECK: Bilateral gynecomastia.    VISUALIZED UPPER ABDOMEN: Stable 1.5 cm left adrenal nodule.    BONES: Healed bilateral rib fractures. Degenerative changes of the spine.    IMPRESSION:     Emphysema.    No lung mass or consolidation.                    LES GIBSON M.D., RADIOLOGY RESIDENT  This document has been electronically signed.  MAYDA GRIFFITH M.D., ATTENDING RADIOLOGIST  This document has been electronically signed. Jan 4 2020 10:01AM        < end of copied text >         No growth    01-03 @ 18:03 .Blood Blood-Peripheral                No growth to date.          RESPIRATORY CULTURES:          Studies  Chest X-RAY  CT SCAN Chest   Venous Dopplers: LE:   CT Abdomen  Others

## 2020-01-07 NOTE — PHYSICAL THERAPY INITIAL EVALUATION ADULT - PERTINENT HX OF CURRENT PROBLEM, REHAB EVAL
59M w/ COPD on 2L NC, DM2 on insulin, CAD s/p stent, HFpEF, pulm HTN, HTN, charted hx of schizophrenia/bipolar disorder, panic disorder, and recent admit for COPD exacerbation d/c 12/12/19 presents from [facility] ([facility]) for n/v/d and hyperglycemia w/ mild DKA (now appearing resolved), also w/ severe sepsis with bandemia w/ source suspected from gastroenteritis vs bronchitis. Admit Dx: Bandemia, Hyperglycemia

## 2020-01-07 NOTE — PROGRESS NOTE ADULT - PROBLEM SELECTOR PLAN 1
improved  c/w dm rx, hydration, education provided to the patient,   -> Case management for safe d/c arrangements

## 2020-01-07 NOTE — PROGRESS NOTE ADULT - SUBJECTIVE AND OBJECTIVE BOX
Patient is seen and examined at the bed side, is afebrile. He is doing better, the epigastric burning has resolved today.       REVIEW OF SYSTEMS: All other review systems are negative      ALLERGIES: No Known Allergies        Vital Signs Last 24 Hrs  T(C): 36.6 (2020 14:24), Max: 36.8 (2020 21:00)  T(F): 97.8 (2020 14:24), Max: 98.3 (2020 21:00)  HR: 78 (2020 18:20) (77 - 112)  BP: 114/78 (2020 14:24) (97/61 - 117/78)  BP(mean): --  RR: 18 (2020 14:24) (18 - 20)  SpO2: 98% (2020 18:20) (88% - 98%)      PHYSICAL EXAM:  GENERAL: Not in distress   CHEST/LUNG:  Air entry bilaterally  HEART: s1 and s2 present  ABDOMEN:  Nontender and  Nondistended  EXTREMITIES: No pedal  edema  CNS: Awake and Alert        LABS: No new Labs                            11.7   7.68  )-----------( 160      ( 2020 05:09 )             36.8       01-05    138  |  95<L>  |  14  ----------------------------<  199<H>  3.8   |  27  |  0.67    Ca    7.9<L>      2020 06:48    TPro  5.7<L>  /  Alb  3.4  /  TBili  0.3  /  DBili  x   /  AST  21  /  ALT  28  /  AlkPhos  53  01-04    PT/INR - ( 2020 03:44 )   PT: 12.4 sec;   INR: 1.08 ratio          CAPILLARY BLOOD GLUCOSE  POCT Blood Glucose.: 191 mg/dL (2020 16:45)  POCT Blood Glucose.: 248 mg/dL (2020 13:50)  POCT Blood Glucose.: 211 mg/dL (2020 09:57)      ABG - ( 2020 06:53 )  pH, Arterial: 7.41  pH, Blood: x     /  pCO2: 46    /  pO2: 80    / HCO3: 29    / Base Excess: 4.2   /  SaO2: 97            Urinalysis Basic - ( 2020 22:18 )  Color: Light Yellow / Appearance: Clear / S.035 / pH: x  Gluc: x / Ketone: Small  / Bili: Negative / Urobili: Negative   Blood: x / Protein: Negative / Nitrite: Negative   Leuk Esterase: Negative / RBC: 11 /hpf / WBC 0 /HPF   Sq Epi: x / Non Sq Epi: 0 /hpf / Bacteria: Negative      Procalcitonin, Serum (20 @ 06:38)    Procalcitonin, Serum: 0.18: This assay is intended for use to determine the change of PCT over time  as an aid in assessing the cumulative 28-day risk of all-cause mortality  for patients diagnosed with severe sepsis or septic shock in the ICU, or  when obtained in the emergency department or other medical wards prior to  ICU admission. This assay was performed by the LanxS PCT  assay. ng/mL      MEDICATIONS  (STANDING):  albuterol/ipratropium for Nebulization 3 milliLiter(s) Nebulizer every 6 hours  aspirin enteric coated 81 milliGRAM(s) Oral daily  atorvastatin 40 milliGRAM(s) Oral at bedtime  budesonide 160 MICROgram(s)/formoterol 4.5 MICROgram(s) Inhaler 2 Puff(s) Inhalation two times a day  chlorhexidine 4% Liquid 1 Application(s) Topical <User Schedule>  clopidogrel Tablet 75 milliGRAM(s) Oral daily  dextrose 5%. 1000 milliLiter(s) (50 mL/Hr) IV Continuous <Continuous>  dextrose 50% Injectable 12.5 Gram(s) IV Push once  dextrose 50% Injectable 25 Gram(s) IV Push once  dextrose 50% Injectable 25 Gram(s) IV Push once  enoxaparin Injectable 40 milliGRAM(s) SubCutaneous daily  famotidine    Tablet 20 milliGRAM(s) Oral daily  furosemide    Tablet 20 milliGRAM(s) Oral daily  insulin glargine Injectable (LANTUS) 40 Unit(s) SubCutaneous at bedtime  insulin lispro (HumaLOG) corrective regimen sliding scale   SubCutaneous three times a day before meals  insulin lispro (HumaLOG) corrective regimen sliding scale   SubCutaneous at bedtime  insulin lispro Injectable (HumaLOG) 10 Unit(s) SubCutaneous three times a day with meals  melatonin 5 milliGRAM(s) Oral at bedtime  risperiDONE   Tablet 0.5 milliGRAM(s) Oral three times a day    MEDICATIONS  (PRN):  acetaminophen   Tablet .. 650 milliGRAM(s) Oral every 6 hours PRN Mild Pain (1 - 3)  ALPRAZolam 0.5 milliGRAM(s) Oral every 6 hours PRN panic attack/anxiety  dextrose 40% Gel 15 Gram(s) Oral once PRN Blood Glucose LESS THAN 70 milliGRAM(s)/deciliter  glucagon  Injectable 1 milliGRAM(s) IntraMuscular once PRN Glucose LESS THAN 70 milligrams/deciliter  oxycodone    5 mG/acetaminophen 325 mG 2 Tablet(s) Oral every 6 hours PRN Moderate Pain (4 - 6)  simethicone 80 milliGRAM(s) Chew every 12 hours PRN Indigestion        RADIOLOGY & ADDITIONAL TESTS:      20: CT Chest No Cont (20 @ 04:06) No lung mass or consolidation.          MICROBIOLOGY DATA:    Culture - Urine (20 @ 00:37)    Specimen Source: .Urine Clean Catch (Midstream)    Culture Results:   No growth      Culture - Blood (20 @ 18:03)    Specimen Source: .Blood Blood-Peripheral    Culture Results:   No growth to date.      Culture - Blood (20 @ 18:03)    Specimen Source: .Blood Blood-Peripheral    Culture Results:   No growth to date.                Assessment and Plan:   · Assessment		  Patient is a 59y old  Male with schizophrenia/bipolar disorder, panic disorder, and recent admit for RSV and COPD exacerbation, d/c 19,  Now sent in to the ER  from [facility] ([facility]) for evaluation of n/v/d and hyperglycemia. Patient reports that over last 3d he has been experiencing chills and sweating w/ nausea and NBNB vomit with loose non-bloody stool.  ON admission, he found to have  tachycardia,  tachypnea and 97% 4L NC -? placed on BiPAP and the eventually weaned to 3L NC.  The Labs is significant for Hyperglycemia, BS of 631 and hyponatremia, NA of 129. He is s/p lantus, (15:43), humalog 10U x1 SQ(14:40), insulin regular 6U IV(15:46), calcium gluconate 2g (15:45),  3L LR, duoneb 3mL x3, zosyn (16:22). The ID consult requested to assist with further evaluation and antibiotic management.     # SIRS( Tachycardia + Tachypnea) - CXR is negative  # Hyperglycemia   # Low Procalcitonin level    would recommend:    1. Monitor OFf Abx   2. Supplemental oxygenation and bronchodilator  as needed  3. Management of Hyperglycemia as per Endocrinology/Primary   4. OOB to chair    d/w  patient and Nursing staff    will follow the patient with you Patient is seen and examined at the bed side, is afebrile. He has no new complaints.       REVIEW OF SYSTEMS: All other review systems are negative        ALLERGIES: No Known Allergies        Vital Signs Last 24 Hrs  T(C): 36.6 (2020 14:24), Max: 36.8 (2020 21:00)  T(F): 97.8 (2020 14:24), Max: 98.3 (2020 21:00)  HR: 78 (2020 18:20) (77 - 112)  BP: 114/78 (2020 14:24) (97/61 - 117/78)  BP(mean): --  RR: 18 (2020 14:24) (18 - 20)  SpO2: 98% (2020 18:20) (88% - 98%)      PHYSICAL EXAM:  GENERAL: Not in distress, on oxygen  via NC  CHEST/LUNG:  Air entry bilaterally  HEART: s1 and s2 present  ABDOMEN:  Nontender and  Nondistended  EXTREMITIES: No pedal  edema  CNS: Awake and Alert        LABS: No new Labs                     11.7   7.68  )-----------( 160      ( 2020 05:09 )             36.8       01-05    138  |  95<L>  |  14  ----------------------------<  199<H>  3.8   |  27  |  0.67    Ca    7.9<L>      2020 06:48    TPro  5.7<L>  /  Alb  3.4  /  TBili  0.3  /  DBili  x   /  AST  21  /  ALT  28  /  AlkPhos  53  01-04    PT/INR - ( 2020 03:44 )   PT: 12.4 sec;   INR: 1.08 ratio          CAPILLARY BLOOD GLUCOSE  POCT Blood Glucose.: 191 mg/dL (2020 16:45)  POCT Blood Glucose.: 248 mg/dL (2020 13:50)  POCT Blood Glucose.: 211 mg/dL (2020 09:57)      ABG - ( 2020 06:53 )  pH, Arterial: 7.41  pH, Blood: x     /  pCO2: 46    /  pO2: 80    / HCO3: 29    / Base Excess: 4.2   /  SaO2: 97            Urinalysis Basic - ( 2020 22:18 )  Color: Light Yellow / Appearance: Clear / S.035 / pH: x  Gluc: x / Ketone: Small  / Bili: Negative / Urobili: Negative   Blood: x / Protein: Negative / Nitrite: Negative   Leuk Esterase: Negative / RBC: 11 /hpf / WBC 0 /HPF   Sq Epi: x / Non Sq Epi: 0 /hpf / Bacteria: Negative      Procalcitonin, Serum (20 @ 06:38)    Procalcitonin, Serum: 0.18: This assay is intended for use to determine the change of PCT over time  as an aid in assessing the cumulative 28-day risk of all-cause mortality  for patients diagnosed with severe sepsis or septic shock in the ICU, or  when obtained in the emergency department or other medical wards prior to  ICU admission. This assay was performed by the XyoS PCT  assay. ng/mL      MEDICATIONS  (STANDING):  albuterol/ipratropium for Nebulization 3 milliLiter(s) Nebulizer every 6 hours  aspirin enteric coated 81 milliGRAM(s) Oral daily  atorvastatin 40 milliGRAM(s) Oral at bedtime  budesonide 160 MICROgram(s)/formoterol 4.5 MICROgram(s) Inhaler 2 Puff(s) Inhalation two times a day  chlorhexidine 4% Liquid 1 Application(s) Topical <User Schedule>  clopidogrel Tablet 75 milliGRAM(s) Oral daily  enoxaparin Injectable 40 milliGRAM(s) SubCutaneous daily  famotidine    Tablet 20 milliGRAM(s) Oral daily  furosemide    Tablet 20 milliGRAM(s) Oral daily  insulin glargine Injectable (LANTUS) 40 Unit(s) SubCutaneous at bedtime  insulin lispro (HumaLOG) corrective regimen sliding scale   SubCutaneous three times a day before meals  insulin lispro (HumaLOG) corrective regimen sliding scale   SubCutaneous at bedtime  insulin lispro Injectable (HumaLOG) 10 Unit(s) SubCutaneous three times a day with meals  melatonin 5 milliGRAM(s) Oral at bedtime  risperiDONE   Tablet 0.5 milliGRAM(s) Oral three times a day    MEDICATIONS  (PRN):  acetaminophen   Tablet .. 650 milliGRAM(s) Oral every 6 hours PRN Mild Pain (1 - 3)  ALPRAZolam 0.5 milliGRAM(s) Oral every 6 hours PRN panic attack/anxiety  dextrose 40% Gel 15 Gram(s) Oral once PRN Blood Glucose LESS THAN 70 milliGRAM(s)/deciliter  glucagon  Injectable 1 milliGRAM(s) IntraMuscular once PRN Glucose LESS THAN 70 milligrams/deciliter  oxycodone    5 mG/acetaminophen 325 mG 2 Tablet(s) Oral every 6 hours PRN Moderate Pain (4 - 6)  simethicone 80 milliGRAM(s) Chew every 12 hours PRN Indigestion        RADIOLOGY & ADDITIONAL TESTS:      20: CT Chest No Cont (20 @ 04:06) No lung mass or consolidation.          MICROBIOLOGY DATA:      Culture - Urine (20 @ 00:37)    Specimen Source: .Urine Clean Catch (Midstream)    Culture Results:   No growth      Culture - Blood (20 @ 18:03)    Specimen Source: .Blood Blood-Peripheral    Culture Results:   No growth to date.      Culture - Blood (20 @ 18:03)    Specimen Source: .Blood Blood-Peripheral    Culture Results:   No growth to date.    FLU A B RSV Detection by PCR (20 @ 14:11)    Flu A Result: NotDetec: The Flu A B RSV assay is a Real-Time PCR test for the qualitative  detection and differentiation of Influenza A, Influenza B, and  Respiratory Syncytial Virus on nasopharyngeal swabs. The results should  be interpreted in the context of all clinical and laboratory findings.    Flu B Result: NotDetec    RSV Result: NotDetec

## 2020-01-07 NOTE — PHYSICAL THERAPY INITIAL EVALUATION ADULT - ADDITIONAL COMMENTS
CT Abdomen 1/03/2020 -  No bowel obstruction or bowel wall thickening. Distended urinary bladder to the level of the umbilicus. Indeterminate left adrenal nodule measuring 1.5 cm  CT CHEST 1/04/2020: Emphysema. No lung mass or consolidation.

## 2020-01-07 NOTE — PROGRESS NOTE ADULT - PROBLEM SELECTOR PLAN 2
on empiric antibiotics: his all cultures  are negative and his RVP is negative too  1/6: no pna: has emphysema  1/7: off antibiotics: no fever:

## 2020-01-07 NOTE — PROGRESS NOTE ADULT - SUBJECTIVE AND OBJECTIVE BOX
Patient is a 59y old  Male who presents with a chief complaint of 59M w/ n/v/d and hyperglycemia sent in from [facility] (07 Jan 2020 18:23)    pt. sen and examined, NAD    INTERVAL HPI/OVERNIGHT EVENTS:  T(C): 36.6 (01-07-20 @ 14:24), Max: 36.8 (01-06-20 @ 21:00)  HR: 78 (01-07-20 @ 18:20) (77 - 112)  BP: 114/78 (01-07-20 @ 14:24) (97/61 - 117/78)  RR: 18 (01-07-20 @ 14:24) (18 - 20)  SpO2: 98% (01-07-20 @ 18:20) (88% - 98%)  Wt(kg): --  I&O's Summary    06 Jan 2020 07:01  -  07 Jan 2020 07:00  --------------------------------------------------------  IN: 1050 mL / OUT: 1200 mL / NET: -150 mL    07 Jan 2020 07:01  -  07 Jan 2020 19:58  --------------------------------------------------------  IN: 880 mL / OUT: 1100 mL / NET: -220 mL        PAST MEDICAL & SURGICAL HISTORY:  Oxygen dependent  Gastroesophageal reflux disease, esophagitis presence not specified  Smoker  Bipolar 1 disorder  Coronary artery disease involving native coronary artery of native heart without angina pectoris  Type 2 diabetes mellitus without complication, with long-term current use of insulin  Pulmonary HTN  HLD (hyperlipidemia)  Stented coronary artery  COPD (chronic obstructive pulmonary disease)  No significant past surgical history      SOCIAL HISTORY  Alcohol:  Tobacco:  Illicit substance use:    FAMILY HISTORY:    REVIEW OF SYSTEMS:  CONSTITUTIONAL: No fever, weight loss, or fatigue  EYES: No eye pain, visual disturbances, or discharge  ENMT:  No difficulty hearing, tinnitus, vertigo; No sinus or throat pain  NECK: No pain or stiffness  RESPIRATORY: No cough, wheezing, chills or hemoptysis; No shortness of breath  CARDIOVASCULAR: No chest pain, palpitations, dizziness, or leg swelling  GASTROINTESTINAL: No abdominal or epigastric pain. No nausea, vomiting, or hematemesis; No diarrhea or constipation. No melena or hematochezia.  GENITOURINARY: No dysuria, frequency, hematuria, or incontinence  NEUROLOGICAL: No headaches, memory loss, loss of strength, numbness, or tremors  SKIN: No itching, burning, rashes, or lesions   LYMPH NODES: No enlarged glands  ENDOCRINE: No heat or cold intolerance; No hair loss  MUSCULOSKELETAL: No joint pain or swelling; No muscle, back, or extremity pain  PSYCHIATRIC: No depression, anxiety, mood swings, or difficulty sleeping  HEME/LYMPH: No easy bruising, or bleeding gums  ALLERY AND IMMUNOLOGIC: No hives or eczema    RADIOLOGY & ADDITIONAL TESTS:    Imaging Personally Reviewed:  [ ] YES  [ ] NO    Consultant(s) Notes Reviewed:  [ ] YES  [ ] NO    PHYSICAL EXAM:  GENERAL: NAD, well-groomed, well-developed  HEAD:  Atraumatic, Normocephalic  EYES: EOMI, PERRLA, conjunctiva and sclera clear  ENMT: No tonsillar erythema, exudates, or enlargement; Moist mucous membranes, Good dentition, No lesions  NECK: Supple, No JVD, Normal thyroid  NERVOUS SYSTEM:  Alert & Oriented X3, Good concentration; Motor Strength 5/5 B/L upper and lower extremities; DTRs 2+ intact and symmetric  CHEST/LUNG: Clear to percussion bilaterally; No rales, rhonchi, wheezing, or rubs  HEART: Regular rate and rhythm; No murmurs, rubs, or gallops  ABDOMEN: Soft, Nontender, Nondistended; Bowel sounds present  EXTREMITIES:  2+ Peripheral Pulses, No clubbing, cyanosis, or edema  LYMPH: No lymphadenopathy noted  SKIN: No rashes or lesions    LABS:              CAPILLARY BLOOD GLUCOSE      POCT Blood Glucose.: 169 mg/dL (07 Jan 2020 17:34)  POCT Blood Glucose.: 184 mg/dL (07 Jan 2020 12:28)  POCT Blood Glucose.: 159 mg/dL (07 Jan 2020 09:06)  POCT Blood Glucose.: 154 mg/dL (06 Jan 2020 22:15)            MEDICATIONS  (STANDING):  albuterol/ipratropium for Nebulization 3 milliLiter(s) Nebulizer every 6 hours  aspirin enteric coated 81 milliGRAM(s) Oral daily  atorvastatin 40 milliGRAM(s) Oral at bedtime  budesonide 160 MICROgram(s)/formoterol 4.5 MICROgram(s) Inhaler 2 Puff(s) Inhalation two times a day  chlorhexidine 4% Liquid 1 Application(s) Topical <User Schedule>  clopidogrel Tablet 75 milliGRAM(s) Oral daily  dextrose 5%. 1000 milliLiter(s) (50 mL/Hr) IV Continuous <Continuous>  dextrose 50% Injectable 12.5 Gram(s) IV Push once  dextrose 50% Injectable 25 Gram(s) IV Push once  dextrose 50% Injectable 25 Gram(s) IV Push once  enoxaparin Injectable 40 milliGRAM(s) SubCutaneous daily  famotidine    Tablet 20 milliGRAM(s) Oral daily  furosemide    Tablet 20 milliGRAM(s) Oral daily  insulin glargine Injectable (LANTUS) 40 Unit(s) SubCutaneous at bedtime  insulin lispro (HumaLOG) corrective regimen sliding scale   SubCutaneous three times a day before meals  insulin lispro (HumaLOG) corrective regimen sliding scale   SubCutaneous at bedtime  insulin lispro Injectable (HumaLOG) 10 Unit(s) SubCutaneous three times a day with meals  melatonin 5 milliGRAM(s) Oral at bedtime  risperiDONE   Tablet 0.5 milliGRAM(s) Oral three times a day    MEDICATIONS  (PRN):  acetaminophen   Tablet .. 650 milliGRAM(s) Oral every 6 hours PRN Mild Pain (1 - 3)  ALPRAZolam 0.5 milliGRAM(s) Oral every 6 hours PRN panic attack/anxiety  dextrose 40% Gel 15 Gram(s) Oral once PRN Blood Glucose LESS THAN 70 milliGRAM(s)/deciliter  glucagon  Injectable 1 milliGRAM(s) IntraMuscular once PRN Glucose LESS THAN 70 milligrams/deciliter  oxycodone    5 mG/acetaminophen 325 mG 2 Tablet(s) Oral every 6 hours PRN Moderate Pain (4 - 6)  simethicone 80 milliGRAM(s) Chew every 12 hours PRN Indigestion      Care Discussed with Consultants/Other Providers [ ] YES  [ ] NO

## 2020-01-07 NOTE — PROGRESS NOTE ADULT - ASSESSMENT
Patient is a 59y old  Male with schizophrenia/bipolar disorder, panic disorder, and recent admit for RSV and COPD exacerbation, d/c 12/12/19,  Now sent in to the ER  from [facility] ([facility]) for evaluation of n/v/d and hyperglycemia. Patient reports that over last 3d he has been experiencing chills and sweating w/ nausea and NBNB vomit with loose non-bloody stool.  ON admission, he found to have  tachycardia,  tachypnea and 97% 4L NC -? placed on BiPAP and the eventually weaned to 3L NC.  The Labs is significant for Hyperglycemia, BS of 631 and hyponatremia, NA of 129. He is s/p lantus, (15:43), humalog 10U x1 SQ(14:40), insulin regular 6U IV(15:46), calcium gluconate 2g (15:45),  3L LR, duoneb 3mL x3, zosyn (16:22). The ID consult requested to assist with further evaluation and antibiotic management.     # SIRS( Tachycardia + Tachypnea) - CXR is negative  # Hyperglycemia   # Low Procalcitonin level    would recommend:      1. Patient is a 59y old  Male with schizophrenia/bipolar disorder, panic disorder, and recent admit for RSV and COPD exacerbation, d/c 12/12/19,  Now sent in to the ER  from [facility] ([facility]) for evaluation of n/v/d and hyperglycemia. Patient reports that over last 3d he has been experiencing chills and sweating w/ nausea and NBNB vomit with loose non-bloody stool.  ON admission, he found to have  tachycardia,  tachypnea and 97% 4L NC -? placed on BiPAP and the eventually weaned to 3L NC.  The Labs is significant for Hyperglycemia, BS of 631 and hyponatremia, NA of 129. He is s/p lantus, (15:43), humalog 10U x1 SQ(14:40), insulin regular 6U IV(15:46), calcium gluconate 2g (15:45),  3L LR, duoneb 3mL x3, zosyn (16:22). The ID consult requested to assist with further evaluation and antibiotic management.     # SIRS( Tachycardia + Tachypnea) - CXR is negative  # Hyperglycemia   # Low Procalcitonin level    would recommend:    1. OOB to chair/PT  2. Monitor OFf Abx   3. Supplemental oxygenation and bronchodilator  as needed  4. Management of Hyperglycemia as per Endocrinology/Primary     d/w  patient and Nursing staff    - Stable from ID stand point    1. Monitor OFF  Abx   2. Supplemental oxygenation and bronchodilator  as needed  3. Management of Hyperglycemia as per Endocrinology/Primary   4. OOB to chair    d/w  patient and Nursing staff    will follow the patient with you Patient is a 59y old  Male with schizophrenia/bipolar disorder, panic disorder, and recent admit for RSV and COPD exacerbation, d/c 12/12/19,  Now sent in to the ER  from [facility] ([facility]) for evaluation of n/v/d and hyperglycemia. Patient reports that over last 3d he has been experiencing chills and sweating w/ nausea and NBNB vomit with loose non-bloody stool.  ON admission, he found to have  tachycardia,  tachypnea and 97% 4L NC -? placed on BiPAP and the eventually weaned to 3L NC.  The Labs is significant for Hyperglycemia, BS of 631 and hyponatremia, NA of 129. He is s/p lantus, (15:43), humalog 10U x1 SQ(14:40), insulin regular 6U IV(15:46), calcium gluconate 2g (15:45),  3L LR, duoneb 3mL x3, zosyn (16:22). The ID consult requested to assist with further evaluation and antibiotic management.     # SIRS( Tachycardia + Tachypnea) - CXR is negative  # Hyperglycemia   # Low Procalcitonin level    would recommend:    1. OOB to chair/PT  2. Monitor OFf Abx   3. Supplemental oxygenation and bronchodilator  as needed  4. Management of Hyperglycemia as per Endocrinology/Primary     d/w  patient and Nursing staff    - Stable from ID stand point

## 2020-01-07 NOTE — PROGRESS NOTE ADULT - ASSESSMENT
Assessment  DMT2: 59y Male with DM T2 with hyperglycemia, was on large dose insulin at home, now on basal bolus insulin, blood sugars improving, trending within acceptable range (, 159), no hypoglycemic episode. Patient is eating meals with good appetite, resting comfortably in bed, breathing improved, s/p ABx dose.  CAD: on medications, no chest pain, stable, monitored.  HTN: Controlled,  on antihypertensive medications.  Overweight/Obesity: No strict exercise routines, not on any weight loss program, neither on low calorie diet.          Laury Romero MD  Cell: [phone]  Office: [phone] Assessment  DMT2: 59y Male with DM T2 with hyperglycemia, was on large dose insulin at home, now on basal bolus insulin,  blood sugars improving, trending within acceptable range (, 159), no hypoglycemic episode. Patient is eating meals with good appetite, resting comfortably in bed, breathing improved, s/p ABx dose.  CAD: on medications, no chest pain, stable, monitored.  HTN: Controlled,  on antihypertensive medications.  Overweight/Obesity: No strict exercise routines, not on any weight loss program, neither on low calorie diet.          Laury Romero MD  Cell: [phone]  Office: [phone]

## 2020-01-07 NOTE — PROGRESS NOTE ADULT - SUBJECTIVE AND OBJECTIVE BOX
Chief complaint  Patient is a 59y old  Male who presents with a chief complaint of 59M w/ n/v/d and hyperglycemia sent in from [facility] (07 Jan 2020 10:01)   Review of systems  Patient in bed, looks comfortable, no fever, no hypoglycemia.    Labs and Fingersticks  CAPILLARY BLOOD GLUCOSE      POCT Blood Glucose.: 159 mg/dL (07 Jan 2020 09:06)  POCT Blood Glucose.: 154 mg/dL (06 Jan 2020 22:15)  POCT Blood Glucose.: 203 mg/dL (06 Jan 2020 18:55)  POCT Blood Glucose.: 145 mg/dL (06 Jan 2020 12:53)                        Medications  MEDICATIONS  (STANDING):  albuterol/ipratropium for Nebulization 3 milliLiter(s) Nebulizer every 6 hours  aspirin enteric coated 81 milliGRAM(s) Oral daily  atorvastatin 40 milliGRAM(s) Oral at bedtime  budesonide 160 MICROgram(s)/formoterol 4.5 MICROgram(s) Inhaler 2 Puff(s) Inhalation two times a day  chlorhexidine 4% Liquid 1 Application(s) Topical <User Schedule>  clopidogrel Tablet 75 milliGRAM(s) Oral daily  dextrose 5%. 1000 milliLiter(s) (50 mL/Hr) IV Continuous <Continuous>  dextrose 50% Injectable 12.5 Gram(s) IV Push once  dextrose 50% Injectable 25 Gram(s) IV Push once  dextrose 50% Injectable 25 Gram(s) IV Push once  enoxaparin Injectable 40 milliGRAM(s) SubCutaneous daily  famotidine    Tablet 20 milliGRAM(s) Oral daily  furosemide    Tablet 20 milliGRAM(s) Oral daily  insulin glargine Injectable (LANTUS) 40 Unit(s) SubCutaneous at bedtime  insulin lispro (HumaLOG) corrective regimen sliding scale   SubCutaneous three times a day before meals  insulin lispro (HumaLOG) corrective regimen sliding scale   SubCutaneous at bedtime  insulin lispro Injectable (HumaLOG) 10 Unit(s) SubCutaneous three times a day with meals  melatonin 5 milliGRAM(s) Oral at bedtime  risperiDONE   Tablet 0.5 milliGRAM(s) Oral three times a day      Physical Exam  General: Patient comfortable in bed  Vital Signs Last 12 Hrs  T(F): 98 (01-07-20 @ 04:10), Max: 98 (01-07-20 @ 04:10)  HR: 77 (01-07-20 @ 04:10) (77 - 85)  BP: 97/61 (01-07-20 @ 04:10) (97/61 - 115/76)  BP(mean): --  RR: 20 (01-07-20 @ 04:10) (20 - 20)  SpO2: 93% (01-07-20 @ 04:10) (93% - 95%)  Neck: No palpable thyroid nodules.  CVS: S1S2, No murmurs  Respiratory: No wheezing, no crepitations  GI: Abdomen soft, bowel sounds positive  Musculoskeletal:  edema lower extremities.   Skin: No skin rashes, no ecchymosis    Diagnostics Chief complaint  Patient is a 59y old  Male who presents with a chief complaint of 59M w/ n/v/d and hyperglycemia sent in from [facility] (07 Jan 2020 10:01)   Review of systems  Patient in bed, looks comfortable, no fever,  no hypoglycemia.    Labs and Fingersticks  CAPILLARY BLOOD GLUCOSE      POCT Blood Glucose.: 159 mg/dL (07 Jan 2020 09:06)  POCT Blood Glucose.: 154 mg/dL (06 Jan 2020 22:15)  POCT Blood Glucose.: 203 mg/dL (06 Jan 2020 18:55)  POCT Blood Glucose.: 145 mg/dL (06 Jan 2020 12:53)                        Medications  MEDICATIONS  (STANDING):  albuterol/ipratropium for Nebulization 3 milliLiter(s) Nebulizer every 6 hours  aspirin enteric coated 81 milliGRAM(s) Oral daily  atorvastatin 40 milliGRAM(s) Oral at bedtime  budesonide 160 MICROgram(s)/formoterol 4.5 MICROgram(s) Inhaler 2 Puff(s) Inhalation two times a day  chlorhexidine 4% Liquid 1 Application(s) Topical <User Schedule>  clopidogrel Tablet 75 milliGRAM(s) Oral daily  dextrose 5%. 1000 milliLiter(s) (50 mL/Hr) IV Continuous <Continuous>  dextrose 50% Injectable 12.5 Gram(s) IV Push once  dextrose 50% Injectable 25 Gram(s) IV Push once  dextrose 50% Injectable 25 Gram(s) IV Push once  enoxaparin Injectable 40 milliGRAM(s) SubCutaneous daily  famotidine    Tablet 20 milliGRAM(s) Oral daily  furosemide    Tablet 20 milliGRAM(s) Oral daily  insulin glargine Injectable (LANTUS) 40 Unit(s) SubCutaneous at bedtime  insulin lispro (HumaLOG) corrective regimen sliding scale   SubCutaneous three times a day before meals  insulin lispro (HumaLOG) corrective regimen sliding scale   SubCutaneous at bedtime  insulin lispro Injectable (HumaLOG) 10 Unit(s) SubCutaneous three times a day with meals  melatonin 5 milliGRAM(s) Oral at bedtime  risperiDONE   Tablet 0.5 milliGRAM(s) Oral three times a day      Physical Exam  General: Patient comfortable in bed  Vital Signs Last 12 Hrs  T(F): 98 (01-07-20 @ 04:10), Max: 98 (01-07-20 @ 04:10)  HR: 77 (01-07-20 @ 04:10) (77 - 85)  BP: 97/61 (01-07-20 @ 04:10) (97/61 - 115/76)  BP(mean): --  RR: 20 (01-07-20 @ 04:10) (20 - 20)  SpO2: 93% (01-07-20 @ 04:10) (93% - 95%)  Neck: No palpable thyroid nodules.  CVS: S1S2, No murmurs  Respiratory: No wheezing, no crepitations  GI: Abdomen soft, bowel sounds positive  Musculoskeletal:  edema lower extremities.   Skin: No skin rashes, no ecchymosis    Diagnostics

## 2020-01-07 NOTE — PROGRESS NOTE ADULT - SUBJECTIVE AND OBJECTIVE BOX
[facility]   MD JONAH SNOW MD RUORU WONG, PA    TEL:  OFFICE: [phone]  DR LUGO CELL: [phone]  PATRIZIA BRISCOE CELL: [phone]  DR. FINCH CELL: [phone]  DR. BARRIOS CELL: [phone]    FROM 5 PM - 7 AM PLEASE CALL ANSWERING SERVICE: [phone]    RENAL FOLLOW UP NOTE  --------------------------------------------------------------------------------  HPI:      Pt seen and examined at bedside.       PAST HISTORY  --------------------------------------------------------------------------------  No significant changes to PMH, PSH, FHx, SHx, unless otherwise noted    ALLERGIES & MEDICATIONS  --------------------------------------------------------------------------------  Allergies    No Known Allergies    Intolerances      Standing Inpatient Medications  albuterol/ipratropium for Nebulization 3 milliLiter(s) Nebulizer every 6 hours  aspirin enteric coated 81 milliGRAM(s) Oral daily  atorvastatin 40 milliGRAM(s) Oral at bedtime  budesonide 160 MICROgram(s)/formoterol 4.5 MICROgram(s) Inhaler 2 Puff(s) Inhalation two times a day  chlorhexidine 4% Liquid 1 Application(s) Topical <User Schedule>  clopidogrel Tablet 75 milliGRAM(s) Oral daily  dextrose 5%. 1000 milliLiter(s) IV Continuous <Continuous>  dextrose 50% Injectable 12.5 Gram(s) IV Push once  dextrose 50% Injectable 25 Gram(s) IV Push once  dextrose 50% Injectable 25 Gram(s) IV Push once  enoxaparin Injectable 40 milliGRAM(s) SubCutaneous daily  famotidine    Tablet 20 milliGRAM(s) Oral daily  furosemide    Tablet 20 milliGRAM(s) Oral daily  insulin glargine Injectable (LANTUS) 40 Unit(s) SubCutaneous at bedtime  insulin lispro (HumaLOG) corrective regimen sliding scale   SubCutaneous three times a day before meals  insulin lispro (HumaLOG) corrective regimen sliding scale   SubCutaneous at bedtime  insulin lispro Injectable (HumaLOG) 10 Unit(s) SubCutaneous three times a day with meals  melatonin 5 milliGRAM(s) Oral at bedtime  risperiDONE   Tablet 0.5 milliGRAM(s) Oral three times a day    PRN Inpatient Medications  acetaminophen   Tablet .. 650 milliGRAM(s) Oral every 6 hours PRN  ALPRAZolam 0.5 milliGRAM(s) Oral every 6 hours PRN  dextrose 40% Gel 15 Gram(s) Oral once PRN  glucagon  Injectable 1 milliGRAM(s) IntraMuscular once PRN  oxycodone    5 mG/acetaminophen 325 mG 2 Tablet(s) Oral every 6 hours PRN  simethicone 80 milliGRAM(s) Chew every 12 hours PRN      REVIEW OF SYSTEMS  --------------------------------------------------------------------------------  General: no fever  MSK: + edema     VITALS/PHYSICAL EXAM  --------------------------------------------------------------------------------  T(C): 36.7 (01-07-20 @ 04:10), Max: 36.8 (01-06-20 @ 21:00)  HR: 77 (01-07-20 @ 04:10) (77 - 93)  BP: 97/61 (01-07-20 @ 04:10) (95/65 - 115/76)  RR: 20 (01-07-20 @ 04:10) (20 - 20)  SpO2: 93% (01-07-20 @ 04:10) (93% - 99%)  Wt(kg): --        01-06-20 @ 07:01  -  01-07-20 @ 07:00  --------------------------------------------------------  IN: 1050 mL / OUT: 1200 mL / NET: -150 mL    01-07-20 @ 07:01  -  01-07-20 @ 09:48  --------------------------------------------------------  IN: 0 mL / OUT: 500 mL / NET: -500 mL      Physical Exam:  	Gen: NAD  	HEENT: MMM  	Pulm: CTA B/L  	CV: S1S2  	Abd: Soft, +BS  	Ext: + LE edema B/L                      Neuro: Awake   	Skin: Warm and Dry   	  LABS/STUDIES  --------------------------------------------------------------------------------              Serum Osmolality 286      [01-05-20 @ 09:57]    Creatinine Trend:  SCr 0.67 [01-05 @ 06:48]  SCr 0.61 [01-04 @ 03:47]  SCr 0.70 [01-03 @ 17:56]  SCr 0.81 [01-03 @ 13:50]    Urinalysis - [01-03-20 @ 22:18]      Color Light Yellow / Appearance Clear / SG 1.035 / pH 6.0      Gluc 1000 mg/dL / Ketone Small  / Bili Negative / Urobili Negative       Blood Small / Protein Negative / Leuk Est Negative / Nitrite Negative      RBC 11 / WBC 0 / Hyaline 0 / Gran  / Sq Epi  / Non Sq Epi 0 / Bacteria Negative    Urine Sodium <35      [01-04-20 @ 22:45]  Urine Osmolality 591      [01-05-20 @ 00:40]    HbA1c 10.4      [12-04-19 @ 08:53]

## 2020-01-08 ENCOUNTER — TRANSCRIPTION ENCOUNTER (OUTPATIENT)
Age: 60
End: 2020-01-08

## 2020-01-08 LAB
CULTURE RESULTS: SIGNIFICANT CHANGE UP
CULTURE RESULTS: SIGNIFICANT CHANGE UP
GLUCOSE BLDC GLUCOMTR-MCNC: 201 MG/DL — HIGH (ref 70–99)
GLUCOSE BLDC GLUCOMTR-MCNC: 222 MG/DL — HIGH (ref 70–99)
GLUCOSE BLDC GLUCOMTR-MCNC: 238 MG/DL — HIGH (ref 70–99)
GLUCOSE BLDC GLUCOMTR-MCNC: 250 MG/DL — HIGH (ref 70–99)
SPECIMEN SOURCE: SIGNIFICANT CHANGE UP
SPECIMEN SOURCE: SIGNIFICANT CHANGE UP

## 2020-01-08 RX ORDER — NICOTINE POLACRILEX 2 MG
0 GUM BUCCAL
Qty: 0 | Refills: 0 | DISCHARGE
Start: 2020-01-08

## 2020-01-08 RX ORDER — SIMETHICONE 80 MG/1
1 TABLET, CHEWABLE ORAL
Qty: 0 | Refills: 0 | DISCHARGE
Start: 2020-01-08

## 2020-01-08 RX ORDER — INSULIN GLARGINE 100 [IU]/ML
45 INJECTION, SOLUTION SUBCUTANEOUS AT BEDTIME
Refills: 0 | Status: DISCONTINUED | OUTPATIENT
Start: 2020-01-08 | End: 2020-01-09

## 2020-01-08 RX ORDER — INSULIN LISPRO 100/ML
13 VIAL (ML) SUBCUTANEOUS
Refills: 0 | Status: DISCONTINUED | OUTPATIENT
Start: 2020-01-08 | End: 2020-01-09

## 2020-01-08 RX ADMIN — Medication 20 MILLIGRAM(S): at 05:23

## 2020-01-08 RX ADMIN — Medication 2: at 09:15

## 2020-01-08 RX ADMIN — Medication 5 MILLIGRAM(S): at 21:14

## 2020-01-08 RX ADMIN — Medication 2: at 17:30

## 2020-01-08 RX ADMIN — FAMOTIDINE 20 MILLIGRAM(S): 10 INJECTION INTRAVENOUS at 11:51

## 2020-01-08 RX ADMIN — ENOXAPARIN SODIUM 40 MILLIGRAM(S): 100 INJECTION SUBCUTANEOUS at 11:50

## 2020-01-08 RX ADMIN — Medication 2: at 13:22

## 2020-01-08 RX ADMIN — OXYCODONE AND ACETAMINOPHEN 2 TABLET(S): 5; 325 TABLET ORAL at 12:25

## 2020-01-08 RX ADMIN — OXYCODONE AND ACETAMINOPHEN 2 TABLET(S): 5; 325 TABLET ORAL at 05:24

## 2020-01-08 RX ADMIN — INSULIN GLARGINE 45 UNIT(S): 100 INJECTION, SOLUTION SUBCUTANEOUS at 23:44

## 2020-01-08 RX ADMIN — Medication 3 MILLILITER(S): at 05:28

## 2020-01-08 RX ADMIN — BUDESONIDE AND FORMOTEROL FUMARATE DIHYDRATE 2 PUFF(S): 160; 4.5 AEROSOL RESPIRATORY (INHALATION) at 05:28

## 2020-01-08 RX ADMIN — Medication 3 MILLILITER(S): at 11:46

## 2020-01-08 RX ADMIN — OXYCODONE AND ACETAMINOPHEN 2 TABLET(S): 5; 325 TABLET ORAL at 11:52

## 2020-01-08 RX ADMIN — Medication 10 UNIT(S): at 09:15

## 2020-01-08 RX ADMIN — Medication 10 UNIT(S): at 13:22

## 2020-01-08 RX ADMIN — CLOPIDOGREL BISULFATE 75 MILLIGRAM(S): 75 TABLET, FILM COATED ORAL at 11:50

## 2020-01-08 RX ADMIN — OXYCODONE AND ACETAMINOPHEN 2 TABLET(S): 5; 325 TABLET ORAL at 05:54

## 2020-01-08 RX ADMIN — RISPERIDONE 0.5 MILLIGRAM(S): 4 TABLET ORAL at 13:23

## 2020-01-08 RX ADMIN — RISPERIDONE 0.5 MILLIGRAM(S): 4 TABLET ORAL at 21:14

## 2020-01-08 RX ADMIN — Medication 81 MILLIGRAM(S): at 11:50

## 2020-01-08 RX ADMIN — RISPERIDONE 0.5 MILLIGRAM(S): 4 TABLET ORAL at 05:23

## 2020-01-08 RX ADMIN — Medication 3 MILLILITER(S): at 23:32

## 2020-01-08 RX ADMIN — Medication 3 MILLILITER(S): at 17:39

## 2020-01-08 RX ADMIN — Medication 0: at 23:46

## 2020-01-08 RX ADMIN — Medication 13 UNIT(S): at 17:31

## 2020-01-08 RX ADMIN — CHLORHEXIDINE GLUCONATE 1 APPLICATION(S): 213 SOLUTION TOPICAL at 05:22

## 2020-01-08 RX ADMIN — OXYCODONE AND ACETAMINOPHEN 2 TABLET(S): 5; 325 TABLET ORAL at 18:16

## 2020-01-08 RX ADMIN — ATORVASTATIN CALCIUM 40 MILLIGRAM(S): 80 TABLET, FILM COATED ORAL at 21:14

## 2020-01-08 RX ADMIN — BUDESONIDE AND FORMOTEROL FUMARATE DIHYDRATE 2 PUFF(S): 160; 4.5 AEROSOL RESPIRATORY (INHALATION) at 17:39

## 2020-01-08 RX ADMIN — OXYCODONE AND ACETAMINOPHEN 2 TABLET(S): 5; 325 TABLET ORAL at 20:40

## 2020-01-08 NOTE — PROGRESS NOTE ADULT - SUBJECTIVE AND OBJECTIVE BOX
Patient is seen and examined at the bed side, is afebrile. He is doing fine, no new events.      REVIEW OF SYSTEMS: All other review systems are negative        ALLERGIES: No Known Allergies        Vital Signs Last 24 Hrs  T(C): 36.7 (2020 04:10), Max: 36.7 (2020 04:10)  T(F): 98 (2020 04:10), Max: 98 (2020 04:10)  HR: 95 (2020 17:40) (84 - 108)  BP: 102/67 (2020 04:10) (102/67 - 118/76)  BP(mean): --  RR: 18 (2020 04:10) (18 - 18)  SpO2: 96% (2020 17:40) (92% - 99%)          PHYSICAL EXAM:  GENERAL: Not in distress, on oxygen  via NC  CHEST/LUNG:  Air entry bilaterally  HEART: s1 and s2 present  ABDOMEN:  Nontender and  Nondistended  EXTREMITIES: No pedal  edema  CNS: Awake and Alert        LABS: No new Labs                       11.7   7.68  )-----------( 160      ( 2020 05:09 )             36.8         01-05    138  |  95<L>  |  14  ----------------------------<  199<H>  3.8   |  27  |  0.67    Ca    7.9<L>      2020 06:48    TPro  5.7<L>  /  Alb  3.4  /  TBili  0.3  /  DBili  x   /  AST  21  /  ALT  28  /  AlkPhos  53  01-04    PT/INR - ( 2020 03:44 )   PT: 12.4 sec;   INR: 1.08 ratio          CAPILLARY BLOOD GLUCOSE  POCT Blood Glucose.: 191 mg/dL (2020 16:45)  POCT Blood Glucose.: 248 mg/dL (2020 13:50)  POCT Blood Glucose.: 211 mg/dL (2020 09:57)      ABG - ( 2020 06:53 )  pH, Arterial: 7.41  pH, Blood: x     /  pCO2: 46    /  pO2: 80    / HCO3: 29    / Base Excess: 4.2   /  SaO2: 97            Urinalysis Basic - ( 2020 22:18 )  Color: Light Yellow / Appearance: Clear / S.035 / pH: x  Gluc: x / Ketone: Small  / Bili: Negative / Urobili: Negative   Blood: x / Protein: Negative / Nitrite: Negative   Leuk Esterase: Negative / RBC: 11 /hpf / WBC 0 /HPF   Sq Epi: x / Non Sq Epi: 0 /hpf / Bacteria: Negative      Procalcitonin, Serum (20 @ 06:38)    Procalcitonin, Serum: 0.18: This assay is intended for use to determine the change of PCT over time  as an aid in assessing the cumulative 28-day risk of all-cause mortality  for patients diagnosed with severe sepsis or septic shock in the ICU, or  when obtained in the emergency department or other medical wards prior to  ICU admission. This assay was performed by the [employer] PCT  assay. ng/mL        MEDICATIONS  (STANDING):  albuterol/ipratropium for Nebulization 3 milliLiter(s) Nebulizer every 6 hours  aspirin enteric coated 81 milliGRAM(s) Oral daily  atorvastatin 40 milliGRAM(s) Oral at bedtime  budesonide 160 MICROgram(s)/formoterol 4.5 MICROgram(s) Inhaler 2 Puff(s) Inhalation two times a day  chlorhexidine 4% Liquid 1 Application(s) Topical <User Schedule>  clopidogrel Tablet 75 milliGRAM(s) Oral daily  dextrose 5%. 1000 milliLiter(s) (50 mL/Hr) IV Continuous <Continuous>  dextrose 50% Injectable 12.5 Gram(s) IV Push once  dextrose 50% Injectable 25 Gram(s) IV Push once  dextrose 50% Injectable 25 Gram(s) IV Push once  enoxaparin Injectable 40 milliGRAM(s) SubCutaneous daily  famotidine    Tablet 20 milliGRAM(s) Oral daily  furosemide    Tablet 20 milliGRAM(s) Oral daily  insulin glargine Injectable (LANTUS) 45 Unit(s) SubCutaneous at bedtime  insulin lispro (HumaLOG) corrective regimen sliding scale   SubCutaneous three times a day before meals  insulin lispro (HumaLOG) corrective regimen sliding scale   SubCutaneous at bedtime  insulin lispro Injectable (HumaLOG) 13 Unit(s) SubCutaneous three times a day with meals  melatonin 5 milliGRAM(s) Oral at bedtime  nicotine - Inhaler 1 Each Inhalation daily  risperiDONE   Tablet 0.5 milliGRAM(s) Oral three times a day    MEDICATIONS  (PRN):  acetaminophen   Tablet .. 650 milliGRAM(s) Oral every 6 hours PRN Mild Pain (1 - 3)  ALPRAZolam 0.5 milliGRAM(s) Oral every 6 hours PRN panic attack/anxiety  dextrose 40% Gel 15 Gram(s) Oral once PRN Blood Glucose LESS THAN 70 milliGRAM(s)/deciliter  glucagon  Injectable 1 milliGRAM(s) IntraMuscular once PRN Glucose LESS THAN 70 milligrams/deciliter  oxycodone    5 mG/acetaminophen 325 mG 2 Tablet(s) Oral every 6 hours PRN Moderate Pain (4 - 6)  simethicone 80 milliGRAM(s) Chew every 12 hours PRN Indigestion        RADIOLOGY & ADDITIONAL TESTS:      20: CT Chest No Cont (20 @ 04:06) No lung mass or consolidation.          MICROBIOLOGY DATA:      Culture - Urine (20 @ 00:37)    Specimen Source: .Urine Clean Catch (Midstream)    Culture Results:   No growth      Culture - Blood (20 @ 18:03)    Specimen Source: .Blood Blood-Peripheral    Culture Results:   No growth to date.      Culture - Blood (20 @ 18:03)    Specimen Source: .Blood Blood-Peripheral    Culture Results:   No growth to date.    FLU A B RSV Detection by PCR (20 @ 14:11)    Flu A Result: NotDetec: The Flu A B RSV assay is a Real-Time PCR test for the qualitative  detection and differentiation of Influenza A, Influenza B, and  Respiratory Syncytial Virus on nasopharyngeal swabs. The results should  be interpreted in the context of all clinical and laboratory findings.    Flu B Result: NotDetec    RSV Result: NotDetec

## 2020-01-08 NOTE — DISCHARGE NOTE PROVIDER - NSDCCPCAREPLAN_GEN_ALL_CORE_FT
PRINCIPAL DISCHARGE DIAGNOSIS  Diagnosis: DKA (diabetic ketoacidoses)  Assessment and Plan of Treatment:   continue Lantus 45units QHS  continue ISS AC/HS  continue Lispro 13 units 3 times/ day before meals      SECONDARY DISCHARGE DIAGNOSES  Diagnosis: Coronary artery disease  Assessment and Plan of Treatment:   continue ASA 81mg QD  continue Plavix 75mg QD  continue atorvastatin 40mg QD    Diagnosis: Acute on chronic respiratory failure with hypoxia  Assessment and Plan of Treatment: continue home oxygen 2L NC  continue duonebs prn  continue symbicort    Diagnosis: Gastroenteritis  Assessment and Plan of Treatment:   supportive care PRINCIPAL DISCHARGE DIAGNOSIS  Diagnosis: DKA (diabetic ketoacidoses)  Assessment and Plan of Treatment:   continue Lantus 45units QHS  continue ISS AC/HS  continue Lispro 13 units 3 times/ day before meals      SECONDARY DISCHARGE DIAGNOSES  Diagnosis: Coronary artery disease  Assessment and Plan of Treatment: continue ASA 81mg QD  continue atorvastatin 40mg QD    Diagnosis: Acute on chronic respiratory failure with hypoxia  Assessment and Plan of Treatment: continue home oxygen 2L NC  continue duonebs prn  continue symbicort    Diagnosis: Gastroenteritis  Assessment and Plan of Treatment:   supportive care PRINCIPAL DISCHARGE DIAGNOSIS  Diagnosis: DKA (diabetic ketoacidoses)  Assessment and Plan of Treatment: Follow up with your Endocrinologist in 1 week  Follow up with your Primary care doctor  Continue insulin as prescribed      SECONDARY DISCHARGE DIAGNOSES  Diagnosis: Coronary artery disease  Assessment and Plan of Treatment: continue ASA 81mg QD  continue atorvastatin 40mg QD    Diagnosis: Acute on chronic respiratory failure with hypoxia  Assessment and Plan of Treatment: continue home oxygen 2L NC  continue duonebs prn  continue symbicort  Follow up with your Pulmonologist in 1 week    Diagnosis: Gastroenteritis  Assessment and Plan of Treatment:   supportive care

## 2020-01-08 NOTE — DISCHARGE NOTE PROVIDER - NSDCMRMEDTOKEN_GEN_ALL_CORE_FT
albuterol 2.5 mg/3 mL (0.083%) inhalation solution: 2.5 milligram(s) inhaled every 6 hours, As Needed SOB  ALPRAZolam 1 mg oral tablet: 1 tab(s) orally every 8 hours, As needed, panic attack  aspirin 81 mg oral tablet: 1 tab(s) orally once a day  atorvastatin 40 mg oral tablet: 1 tab(s) orally once a day  budesonide-formoterol 160 mcg-4.5 mcg/inh inhalation aerosol: 2 puff(s) inhaled 2 times a day  furosemide 20 mg oral tablet: 1 tab(s) orally once a day  HumaLOG 100 units/mL subcutaneous solution: 10 unit(s) subcutaneous 3 times a day (with meals)  insulin glargine: 60 unit(s) subcutaneous 2 times a day  melatonin 5 mg oral tablet: 1 tab(s) orally once a day (at bedtime)  nicotine 10 mg inhalation device:  inhaled   oxycodone-acetaminophen 5 mg-325 mg oral tablet: 2 tab(s) orally every 6 hours, As needed, Severe Pain (7 - 10)  Pepcid 20 mg oral tablet: orally once a day  Plavix 75 mg oral tablet: 1 tab(s) orally once a day  polyethylene glycol 3350 oral powder for reconstitution: 17 gram(s) orally once a day  RisperDAL 0.5 mg oral tablet: orally 3 times a day  senna oral tablet: 2 tab(s) orally once a day (at bedtime)  simethicone 80 mg oral tablet, chewable: 1 tab(s) orally every 12 hours, As needed, Indigestion albuterol 2.5 mg/3 mL (0.083%) inhalation solution: 2.5 milligram(s) inhaled every 6 hours, As Needed SOB  aspirin 81 mg oral tablet: 1 tab(s) orally once a day  atorvastatin 40 mg oral tablet: 1 tab(s) orally once a day  budesonide-formoterol 160 mcg-4.5 mcg/inh inhalation aerosol: 2 puff(s) inhaled 2 times a day  furosemide 20 mg oral tablet: 1 tab(s) orally once a day  HumaLOG 100 units/mL subcutaneous solution: 10 unit(s) subcutaneous 3 times a day (with meals)  insulin glargine: 60 unit(s) subcutaneous 2 times a day  melatonin 5 mg oral tablet: 1 tab(s) orally once a day (at bedtime)  nicotine 10 mg inhalation device:  inhaled   oxycodone-acetaminophen 5 mg-325 mg oral tablet: 2 tab(s) orally every 6 hours, As needed, Severe Pain (7 - 10)  Pepcid 20 mg oral tablet: orally once a day  polyethylene glycol 3350 oral powder for reconstitution: 17 gram(s) orally once a day  RisperDAL 0.5 mg oral tablet: orally 3 times a day  senna oral tablet: 2 tab(s) orally once a day (at bedtime)  simethicone 80 mg oral tablet, chewable: 1 tab(s) orally every 12 hours, As needed, Indigestion albuterol 2.5 mg/3 mL (0.083%) inhalation solution: 2.5 milligram(s) inhaled every 6 hours, As Needed SOB  aluminum hydroxide-magnesium hydroxide 200 mg-200 mg/5 mL oral suspension: 30 milliliter(s) orally every 4 hours, As needed, Dyspepsia  aspirin 81 mg oral tablet: 1 tab(s) orally once a day  atorvastatin 40 mg oral tablet: 1 tab(s) orally once a day  budesonide-formoterol 160 mcg-4.5 mcg/inh inhalation aerosol: 2 puff(s) inhaled 2 times a day  furosemide 20 mg oral tablet: 1 tab(s) orally once a day  HumaLOG 100 units/mL subcutaneous solution: 30 unit(s) subcutaneous 3 times a day (with meals)  insulin glargine: 75 unit(s) subcutaneous once a day (at bedtime)  lisinopril 2.5 mg oral tablet: 1 tab(s) orally once a day  melatonin 5 mg oral tablet: 1 tab(s) orally once a day (at bedtime)  nicotine 10 mg inhalation device:  inhaled   oxycodone-acetaminophen 5 mg-325 mg oral tablet: 2 tab(s) orally every 6 hours, As needed, Severe Pain (7 - 10)  pantoprazole 40 mg oral delayed release tablet: 1 tab(s) orally once a day  Pepcid 20 mg oral tablet: orally once a day  polyethylene glycol 3350 oral powder for reconstitution: 17 gram(s) orally once a day  RisperDAL 0.5 mg oral tablet: orally 3 times a day  senna oral tablet: 2 tab(s) orally once a day (at bedtime)  sertraline 50 mg oral tablet: 1 tab(s) orally once a day (at bedtime)  simethicone 80 mg oral tablet, chewable: 1 tab(s) orally every 12 hours, As needed, Indigestion

## 2020-01-08 NOTE — PROGRESS NOTE ADULT - SUBJECTIVE AND OBJECTIVE BOX
Chief complaint  Patient is a 59y old  Male who presents with a chief complaint of 59M w/ n/v/d and hyperglycemia sent in from [facility] (08 Jan 2020 17:08)   Review of systems  Patient in bed, looks comfortable, no fever, no hypoglycemia.    Labs and Fingersticks  CAPILLARY BLOOD GLUCOSE      POCT Blood Glucose.: 222 mg/dL (08 Jan 2020 17:27)  POCT Blood Glucose.: 250 mg/dL (08 Jan 2020 13:10)  POCT Blood Glucose.: 238 mg/dL (08 Jan 2020 09:11)  POCT Blood Glucose.: 198 mg/dL (07 Jan 2020 21:35)      Medications  MEDICATIONS  (STANDING):  albuterol/ipratropium for Nebulization 3 milliLiter(s) Nebulizer every 6 hours  aspirin enteric coated 81 milliGRAM(s) Oral daily  atorvastatin 40 milliGRAM(s) Oral at bedtime  budesonide 160 MICROgram(s)/formoterol 4.5 MICROgram(s) Inhaler 2 Puff(s) Inhalation two times a day  chlorhexidine 4% Liquid 1 Application(s) Topical <User Schedule>  clopidogrel Tablet 75 milliGRAM(s) Oral daily  dextrose 5%. 1000 milliLiter(s) (50 mL/Hr) IV Continuous <Continuous>  dextrose 50% Injectable 12.5 Gram(s) IV Push once  dextrose 50% Injectable 25 Gram(s) IV Push once  dextrose 50% Injectable 25 Gram(s) IV Push once  enoxaparin Injectable 40 milliGRAM(s) SubCutaneous daily  famotidine    Tablet 20 milliGRAM(s) Oral daily  furosemide    Tablet 20 milliGRAM(s) Oral daily  insulin glargine Injectable (LANTUS) 45 Unit(s) SubCutaneous at bedtime  insulin lispro (HumaLOG) corrective regimen sliding scale   SubCutaneous three times a day before meals  insulin lispro (HumaLOG) corrective regimen sliding scale   SubCutaneous at bedtime  insulin lispro Injectable (HumaLOG) 13 Unit(s) SubCutaneous three times a day with meals  melatonin 5 milliGRAM(s) Oral at bedtime  nicotine - Inhaler 1 Each Inhalation daily  risperiDONE   Tablet 0.5 milliGRAM(s) Oral three times a day      Physical Exam  General: Patient comfortable in bed  Vital Signs Last 12 Hrs  T(F): --  HR: 95 (01-08-20 @ 17:40) (91 - 96)  BP: --  BP(mean): --  RR: --  SpO2: 96% (01-08-20 @ 17:40) (95% - 96%)  Neck: No palpable thyroid nodules.  CVS: S1S2, No murmurs  Respiratory: No wheezing, no crepitations  GI: Abdomen soft, bowel sounds positive  Musculoskeletal:  edema lower extremities.   Skin: No skin rashes, no ecchymosis    Diagnostics

## 2020-01-08 NOTE — PROGRESS NOTE ADULT - SUBJECTIVE AND OBJECTIVE BOX
Patient is a 59y old  Male who presents with a chief complaint of 59M w/ n/v/d and hyperglycemia sent in from [facility] (07 Jan 2020 19:58)      Any change in ROS:   no overnight events:   MEDICATIONS  (STANDING):  albuterol/ipratropium for Nebulization 3 milliLiter(s) Nebulizer every 6 hours  aspirin enteric coated 81 milliGRAM(s) Oral daily  atorvastatin 40 milliGRAM(s) Oral at bedtime  budesonide 160 MICROgram(s)/formoterol 4.5 MICROgram(s) Inhaler 2 Puff(s) Inhalation two times a day  chlorhexidine 4% Liquid 1 Application(s) Topical <User Schedule>  clopidogrel Tablet 75 milliGRAM(s) Oral daily  dextrose 5%. 1000 milliLiter(s) (50 mL/Hr) IV Continuous <Continuous>  dextrose 50% Injectable 12.5 Gram(s) IV Push once  dextrose 50% Injectable 25 Gram(s) IV Push once  dextrose 50% Injectable 25 Gram(s) IV Push once  enoxaparin Injectable 40 milliGRAM(s) SubCutaneous daily  famotidine    Tablet 20 milliGRAM(s) Oral daily  furosemide    Tablet 20 milliGRAM(s) Oral daily  insulin glargine Injectable (LANTUS) 40 Unit(s) SubCutaneous at bedtime  insulin lispro (HumaLOG) corrective regimen sliding scale   SubCutaneous three times a day before meals  insulin lispro (HumaLOG) corrective regimen sliding scale   SubCutaneous at bedtime  insulin lispro Injectable (HumaLOG) 10 Unit(s) SubCutaneous three times a day with meals  melatonin 5 milliGRAM(s) Oral at bedtime  nicotine - Inhaler 1 Each Inhalation daily  risperiDONE   Tablet 0.5 milliGRAM(s) Oral three times a day    MEDICATIONS  (PRN):  acetaminophen   Tablet .. 650 milliGRAM(s) Oral every 6 hours PRN Mild Pain (1 - 3)  ALPRAZolam 0.5 milliGRAM(s) Oral every 6 hours PRN panic attack/anxiety  dextrose 40% Gel 15 Gram(s) Oral once PRN Blood Glucose LESS THAN 70 milliGRAM(s)/deciliter  glucagon  Injectable 1 milliGRAM(s) IntraMuscular once PRN Glucose LESS THAN 70 milligrams/deciliter  oxycodone    5 mG/acetaminophen 325 mG 2 Tablet(s) Oral every 6 hours PRN Moderate Pain (4 - 6)  simethicone 80 milliGRAM(s) Chew every 12 hours PRN Indigestion    Vital Signs Last 24 Hrs  T(C): 36.7 (08 Jan 2020 04:10), Max: 36.7 (08 Jan 2020 04:10)  T(F): 98 (08 Jan 2020 04:10), Max: 98 (08 Jan 2020 04:10)  HR: 92 (08 Jan 2020 05:46) (78 - 112)  BP: 102/67 (08 Jan 2020 04:10) (102/67 - 118/76)  BP(mean): --  RR: 18 (08 Jan 2020 04:10) (18 - 18)  SpO2: 97% (08 Jan 2020 05:46) (88% - 99%)    I&O's Summary    07 Jan 2020 07:01  -  08 Jan 2020 07:00  --------------------------------------------------------  IN: 1270 mL / OUT: 1850 mL / NET: -580 mL          Physical Exam:   GENERAL: bese+  HEENT: GALO/   Atraumatic, Normocephalic  ENMT: No tonsillar erythema, exudates, or enlargement; Moist mucous membranes, Good dentition, No lesions  NECK: Supple, No JVD, Normal thyroid  CHEST/LUNG: Clear to auscultaion  CVS: Regular rate and rhythm; No murmurs, rubs, or gallops  GI: : Soft, Nontender, Nondistended; Bowel sounds present  NERVOUS SYSTEM:  Alert & Oriented X3  EXTREMITIES:  2+ Peripheral Pulses, No clubbing, cyanosis, or edema  LYMPH: No lymphadenopathy noted  SKIN: No rashes or lesions  ENDOCRINOLOGY: No Thyromegaly  PSYCH: Appropriate    Labs:  UNK, 30, 28        01-05    138  |  95<L>  |  14  ----------------------------<  199<H>  3.8   |  27  |  0.67        CAPILLARY BLOOD GLUCOSE      POCT Blood Glucose.: 238 mg/dL (08 Jan 2020 09:11)  POCT Blood Glucose.: 198 mg/dL (07 Jan 2020 21:35)  POCT Blood Glucose.: 169 mg/dL (07 Jan 2020 17:34)  POCT Blood Glucose.: 184 mg/dL (07 Jan 2020 12:28)            Procalcitonin, Serum: 0.18 ng/mL (01-07 @ 06:38)        RECENT CULTURES:  01-04 @ 00:37 .Urine Clean Catch (Midstream)              < from: CT Chest No Cont (01.04.20 @ 04:06) >  CT of the Chest was performed without intravenous contrast.  Sagittal and coronal reformats were performed.      FINDINGS:    LUNGS AND AIRWAYS: Patent central airways. Centrilobular emphysema. Bibasilar subsegmental atelectasis. No focal consolidation or parenchymal abnormality.    PLEURA: No pleural effusion.    MEDIASTINUM AND CHINA: No lymphadenopathy.    VESSELS: Atherosclerotic calcifications of the aorta and coronary arteries. Enlarged main pulmonary artery measuring 4.2 cm, compatible with pulmonary arterial hypertension.    HEART: Heart size is normal. No pericardial effusion.    CHEST WALL AND LOWER NECK: Bilateral gynecomastia.    VISUALIZED UPPER ABDOMEN: Stable 1.5 cm left adrenal nodule.    BONES: Healed bilateral rib fractures. Degenerative changes of the spine.    IMPRESSION:     Emphysema.    No lung mass or consolidation.                    LES GIBSON M.D., RADIOLOGY RESIDENT  This document has been electronically signed.  MAYDA GRIFFITH M.D., ATTENDING RADIOLOGIST    < end of copied text >    No growth    01-03 @ 18:03 .Blood Blood-Peripheral                No growth to date.          RESPIRATORY CULTURES:          Studies  Chest X-RAY  CT SCAN Chest   Venous Dopplers: LE:   CT Abdomen  Others

## 2020-01-08 NOTE — PROGRESS NOTE ADULT - ASSESSMENT
Patient is a 59y old  Male with schizophrenia/bipolar disorder, panic disorder, and recent admit for RSV and COPD exacerbation, d/c 12/12/19,  Now sent in to the ER  from [facility] ([facility]) for evaluation of n/v/d and hyperglycemia. Patient reports that over last 3d he has been experiencing chills and sweating w/ nausea and NBNB vomit with loose non-bloody stool.  ON admission, he found to have  tachycardia,  tachypnea and 97% 4L NC -? placed on BiPAP and the eventually weaned to 3L NC.  The Labs is significant for Hyperglycemia, BS of 631 and hyponatremia, NA of 129. He is s/p lantus, (15:43), humalog 10U x1 SQ(14:40), insulin regular 6U IV(15:46), calcium gluconate 2g (15:45),  3L LR, duoneb 3mL x3, zosyn (16:22). The ID consult requested to assist with further evaluation and antibiotic management.     # SIRS( Tachycardia + Tachypnea) - CXR is negative  # Hyperglycemia   # Low Procalcitonin level    would recommend:    1. Management of Hyperglycemia as per Endocrinology/Primary     2. Monitor OFf Abx   3. Supplemental oxygenation and bronchodilator  as needed  4. OOB to chair/PT      - Stable from ID stand point

## 2020-01-08 NOTE — PROGRESS NOTE ADULT - SUBJECTIVE AND OBJECTIVE BOX
[facility]   MD JONAH SNOW MD RUORU WONG, PA    TEL:  OFFICE: [phone]  DR LUGO CELL: [phone]  PATRIZIA BRISCOE CELL: [phone]  DR. FINCH CELL: [phone]  DR. BARRIOS CELL: [phone]    FROM 5 PM - 7 AM PLEASE CALL ANSWERING SERVICE: [phone]    RENAL FOLLOW UP NOTE  --------------------------------------------------------------------------------  HPI:      Pt seen and examined at bedside.   Denies SOB, chest pain     PAST HISTORY  --------------------------------------------------------------------------------  No significant changes to PMH, PSH, FHx, SHx, unless otherwise noted    ALLERGIES & MEDICATIONS  --------------------------------------------------------------------------------  Allergies    No Known Allergies    Intolerances      Standing Inpatient Medications  albuterol/ipratropium for Nebulization 3 milliLiter(s) Nebulizer every 6 hours  aspirin enteric coated 81 milliGRAM(s) Oral daily  atorvastatin 40 milliGRAM(s) Oral at bedtime  budesonide 160 MICROgram(s)/formoterol 4.5 MICROgram(s) Inhaler 2 Puff(s) Inhalation two times a day  chlorhexidine 4% Liquid 1 Application(s) Topical <User Schedule>  clopidogrel Tablet 75 milliGRAM(s) Oral daily  dextrose 5%. 1000 milliLiter(s) IV Continuous <Continuous>  dextrose 50% Injectable 12.5 Gram(s) IV Push once  dextrose 50% Injectable 25 Gram(s) IV Push once  dextrose 50% Injectable 25 Gram(s) IV Push once  enoxaparin Injectable 40 milliGRAM(s) SubCutaneous daily  famotidine    Tablet 20 milliGRAM(s) Oral daily  furosemide    Tablet 20 milliGRAM(s) Oral daily  insulin glargine Injectable (LANTUS) 40 Unit(s) SubCutaneous at bedtime  insulin lispro (HumaLOG) corrective regimen sliding scale   SubCutaneous three times a day before meals  insulin lispro (HumaLOG) corrective regimen sliding scale   SubCutaneous at bedtime  insulin lispro Injectable (HumaLOG) 10 Unit(s) SubCutaneous three times a day with meals  melatonin 5 milliGRAM(s) Oral at bedtime  nicotine - Inhaler 1 Each Inhalation daily  risperiDONE   Tablet 0.5 milliGRAM(s) Oral three times a day    PRN Inpatient Medications  acetaminophen   Tablet .. 650 milliGRAM(s) Oral every 6 hours PRN  ALPRAZolam 0.5 milliGRAM(s) Oral every 6 hours PRN  dextrose 40% Gel 15 Gram(s) Oral once PRN  glucagon  Injectable 1 milliGRAM(s) IntraMuscular once PRN  oxycodone    5 mG/acetaminophen 325 mG 2 Tablet(s) Oral every 6 hours PRN  simethicone 80 milliGRAM(s) Chew every 12 hours PRN      REVIEW OF SYSTEMS  --------------------------------------------------------------------------------  General: no fever  CVS: no chest pain  RESP: no sob, no cough  ABD: no abdominal pain  : no dysuria,  MSK: no edema     VITALS/PHYSICAL EXAM  --------------------------------------------------------------------------------  T(C): 36.7 (01-08-20 @ 04:10), Max: 36.7 (01-08-20 @ 04:10)  HR: 92 (01-08-20 @ 05:46) (78 - 112)  BP: 102/67 (01-08-20 @ 04:10) (102/67 - 118/76)  RR: 18 (01-08-20 @ 04:10) (18 - 18)  SpO2: 97% (01-08-20 @ 05:46) (88% - 99%)  Wt(kg): --        01-07-20 @ 07:01  -  01-08-20 @ 07:00  --------------------------------------------------------  IN: 1270 mL / OUT: 1850 mL / NET: -580 mL    01-08-20 @ 07:01  -  01-08-20 @ 11:16  --------------------------------------------------------  IN: 0 mL / OUT: 300 mL / NET: -300 mL      Physical Exam:  	Gen: NAD  	HEENT: MMM  	Pulm: CTA B/L  	CV: S1S2  	Abd: Soft, +BS  	Ext: No LE edema B/L                      Neuro: Awake   	Skin: Warm and Dry   	 no deedee    LABS/STUDIES  --------------------------------------------------------------------------------                Creatinine Trend:  SCr 0.67 [01-05 @ 06:48]  SCr 0.61 [01-04 @ 03:47]  SCr 0.70 [01-03 @ 17:56]  SCr 0.81 [01-03 @ 13:50]    Urinalysis - [01-03-20 @ 22:18]      Color Light Yellow / Appearance Clear / SG 1.035 / pH 6.0      Gluc 1000 mg/dL / Ketone Small  / Bili Negative / Urobili Negative       Blood Small / Protein Negative / Leuk Est Negative / Nitrite Negative      RBC 11 / WBC 0 / Hyaline 0 / Gran  / Sq Epi  / Non Sq Epi 0 / Bacteria Negative    Urine Sodium <35      [01-04-20 @ 22:45]  Urine Osmolality 591      [01-05-20 @ 00:40]    HbA1c 10.4      [12-04-19 @ 08:53]

## 2020-01-08 NOTE — PROGRESS NOTE ADULT - SUBJECTIVE AND OBJECTIVE BOX
Patient is a 59y old  Male who presents with a chief complaint of 59M w/ n/v/d and hyperglycemia sent in from [facility] (08 Jan 2020 20:18)    pt. seen and examined, denies any c/o   INTERVAL HPI/OVERNIGHT EVENTS:  T(C): 36.4 (01-08-20 @ 21:23), Max: 36.7 (01-08-20 @ 04:10)  HR: 84 (01-08-20 @ 23:33) (84 - 108)  BP: 120/75 (01-08-20 @ 21:23) (102/67 - 120/75)  RR: 17 (01-08-20 @ 21:23) (17 - 18)  SpO2: 97% (01-08-20 @ 23:33) (94% - 97%)  Wt(kg): --  I&O's Summary    07 Jan 2020 07:01  -  08 Jan 2020 07:00  --------------------------------------------------------  IN: 1270 mL / OUT: 1850 mL / NET: -580 mL    08 Jan 2020 07:01  -  09 Jan 2020 00:15  --------------------------------------------------------  IN: 0 mL / OUT: 1100 mL / NET: -1100 mL        PAST MEDICAL & SURGICAL HISTORY:  Oxygen dependent  Gastroesophageal reflux disease, esophagitis presence not specified  Smoker  Bipolar 1 disorder  Coronary artery disease involving native coronary artery of native heart without angina pectoris  Type 2 diabetes mellitus without complication, with long-term current use of insulin  Pulmonary HTN  HLD (hyperlipidemia)  Stented coronary artery  COPD (chronic obstructive pulmonary disease)  No significant past surgical history      SOCIAL HISTORY  Alcohol:  Tobacco:  Illicit substance use:    FAMILY HISTORY:    REVIEW OF SYSTEMS:  CONSTITUTIONAL: No fever, weight loss, or fatigue  EYES: No eye pain, visual disturbances, or discharge  ENMT:  No difficulty hearing, tinnitus, vertigo; No sinus or throat pain  NECK: No pain or stiffness  RESPIRATORY: No cough, wheezing, chills or hemoptysis; No shortness of breath  CARDIOVASCULAR: No chest pain, palpitations, dizziness, or leg swelling  GASTROINTESTINAL: No abdominal or epigastric pain. No nausea, vomiting, or hematemesis; No diarrhea or constipation. No melena or hematochezia.  GENITOURINARY: No dysuria, frequency, hematuria, or incontinence  NEUROLOGICAL: No headaches, memory loss, loss of strength, numbness, or tremors  SKIN: No itching, burning, rashes, or lesions   LYMPH NODES: No enlarged glands  ENDOCRINE: No heat or cold intolerance; No hair loss  MUSCULOSKELETAL: No joint pain or swelling; No muscle, back, or extremity pain  PSYCHIATRIC: No depression, anxiety, mood swings, or difficulty sleeping  HEME/LYMPH: No easy bruising, or bleeding gums  ALLERY AND IMMUNOLOGIC: No hives or eczema    RADIOLOGY & ADDITIONAL TESTS:    Imaging Personally Reviewed:  [ ] YES  [ ] NO    Consultant(s) Notes Reviewed:  [ ] YES  [ ] NO    PHYSICAL EXAM:  GENERAL: NAD, well-groomed, well-developed  HEAD:  Atraumatic, Normocephalic  EYES: EOMI, PERRLA, conjunctiva and sclera clear  ENMT: No tonsillar erythema, exudates, or enlargement; Moist mucous membranes, Good dentition, No lesions  NECK: Supple, No JVD, Normal thyroid  NERVOUS SYSTEM:  Alert & Oriented X3, Good concentration; Motor Strength 5/5 B/L upper and lower extremities; DTRs 2+ intact and symmetric  CHEST/LUNG: Clear to percussion bilaterally; No rales, rhonchi, wheezing, or rubs  HEART: Regular rate and rhythm; No murmurs, rubs, or gallops  ABDOMEN: Soft, Nontender, Nondistended; Bowel sounds present  EXTREMITIES:  2+ Peripheral Pulses, No clubbing, cyanosis, or edema  LYMPH: No lymphadenopathy noted  SKIN: No rashes or lesions    LABS:              CAPILLARY BLOOD GLUCOSE      POCT Blood Glucose.: 201 mg/dL (08 Jan 2020 23:40)  POCT Blood Glucose.: 222 mg/dL (08 Jan 2020 17:27)  POCT Blood Glucose.: 250 mg/dL (08 Jan 2020 13:10)  POCT Blood Glucose.: 238 mg/dL (08 Jan 2020 09:11)            MEDICATIONS  (STANDING):  albuterol/ipratropium for Nebulization 3 milliLiter(s) Nebulizer every 6 hours  aspirin enteric coated 81 milliGRAM(s) Oral daily  atorvastatin 40 milliGRAM(s) Oral at bedtime  budesonide 160 MICROgram(s)/formoterol 4.5 MICROgram(s) Inhaler 2 Puff(s) Inhalation two times a day  chlorhexidine 4% Liquid 1 Application(s) Topical <User Schedule>  clopidogrel Tablet 75 milliGRAM(s) Oral daily  dextrose 5%. 1000 milliLiter(s) (50 mL/Hr) IV Continuous <Continuous>  dextrose 50% Injectable 12.5 Gram(s) IV Push once  dextrose 50% Injectable 25 Gram(s) IV Push once  dextrose 50% Injectable 25 Gram(s) IV Push once  enoxaparin Injectable 40 milliGRAM(s) SubCutaneous daily  famotidine    Tablet 20 milliGRAM(s) Oral daily  furosemide    Tablet 20 milliGRAM(s) Oral daily  insulin glargine Injectable (LANTUS) 45 Unit(s) SubCutaneous at bedtime  insulin lispro (HumaLOG) corrective regimen sliding scale   SubCutaneous three times a day before meals  insulin lispro (HumaLOG) corrective regimen sliding scale   SubCutaneous at bedtime  insulin lispro Injectable (HumaLOG) 13 Unit(s) SubCutaneous three times a day with meals  melatonin 5 milliGRAM(s) Oral at bedtime  nicotine - Inhaler 1 Each Inhalation daily  risperiDONE   Tablet 0.5 milliGRAM(s) Oral three times a day    MEDICATIONS  (PRN):  acetaminophen   Tablet .. 650 milliGRAM(s) Oral every 6 hours PRN Mild Pain (1 - 3)  ALPRAZolam 0.5 milliGRAM(s) Oral every 6 hours PRN panic attack/anxiety  dextrose 40% Gel 15 Gram(s) Oral once PRN Blood Glucose LESS THAN 70 milliGRAM(s)/deciliter  glucagon  Injectable 1 milliGRAM(s) IntraMuscular once PRN Glucose LESS THAN 70 milligrams/deciliter  oxycodone    5 mG/acetaminophen 325 mG 2 Tablet(s) Oral every 6 hours PRN Moderate Pain (4 - 6)  simethicone 80 milliGRAM(s) Chew every 12 hours PRN Indigestion      Care Discussed with Consultants/Other Providers [ ] YES  [ ] NO

## 2020-01-08 NOTE — DISCHARGE NOTE PROVIDER - PROVIDER TOKENS
PROVIDER:[TOKEN:[[id number]]] PROVIDER:[TOKEN:[[id number]]],PROVIDER:[TOKEN:[[id number]]]

## 2020-01-08 NOTE — PROGRESS NOTE ADULT - PROBLEM SELECTOR PLAN 6
- Secondary to PNA + COPD.  - Continue supplemental O2, titrate as needed (on 3L NC as needed at home).  - Continue bronchodilators + ICS.  - Empiric antibiotics.    4/3  Acute hypoxia is resolve  Pt met on her baseline of 3L with appropriate sats (88-92% is acceptable)  Treat underlying AECOPD & CAPna    per heide team

## 2020-01-08 NOTE — DISCHARGE NOTE PROVIDER - CARE PROVIDERS DIRECT ADDRESSES
[email or web address]_Unknown

## 2020-01-08 NOTE — DISCHARGE NOTE PROVIDER - CARE PROVIDER_API CALL
Christiano Araujo)  Internal Medicine; Pulmonary Disease  [facility], [address] [place] [address]  Phone: [phone]  Fax: [phone]  Follow Up Time: Christiano Araujo)  Internal Medicine; Pulmonary Disease  [facility], [address]  [place] [address]  Phone: [phone]  Fax: [phone]  Follow Up Time:     Laury Romero)  EndocrinologyMetabDiabetes; Internal Medicine  [address] [place] [address]  Phone: [phone]  Fax: [phone]  Follow Up Time:

## 2020-01-08 NOTE — DISCHARGE NOTE PROVIDER - HOSPITAL COURSE
59M w/ COPD on 2L NC, DM2 on insulin, CAD s/p stent, HFpEF, pulm HTN, HTN, charted hx of schizophrenia/bipolar disorder, panic disorder, and recent admit for COPD exacerbation d/c 12/12/19 presents from [facility] (SNF) for n/v/d and hyperglycemia w/ mild DKA (now appearing resolved) w/ severe sepsis with bandemia w/ source suspected from gastroenteritis 59M w/ COPD on 2L NC, DM2 on insulin, CAD s/p stent, HFpEF, pulm HTN, HTN, charted hx of schizophrenia/bipolar disorder, panic disorder, and recent admit for COPD exacerbation d/c 12/12/19 presents from [facility] (SNF) for n/v/d and hyperglycemia w/ mild DKA (now appearing resolved) w/ severe sepsis with bandemia w/ source suspected from gastroenteritis.  Urine, blood cultures were negative. Pulm consulted. Pt has emphysema. Endocrinology consulted. Adjusted insulin. Pt stable for discharge on home O2.     Outpatient follow up with PMD, Endocrinologist and Pulm. 59M w/ COPD on 2L NC, DM2 on insulin, CAD s/p stent, HFpEF, pulm HTN, HTN, charted hx of schizophrenia/bipolar disorder, panic disorder, and recent admit for COPD exacerbation d/c 12/12/19 presents from [facility] ([facility]) for n/v/d and hyperglycemia w/ mild DKA (now appearing resolved). Admitted with diabetic ketoacidosis, found to have hyponatremia.    Hyponatremia Likely sec to hyperglycemia. Patient was on SSRI.Seen by endocrinology and nephrology.    Optimize glucose control. Na normal.    Continue lasix at present in view of h/o CHF-Diastolic. avoid excessive free water intake     w/ severe sepsis with bandemia w/ source suspected from gastroenteritis.      Urine, blood cultures were negative. Pulm consulted. Pt has emphysema. Endocrinology consulted. Adjusted insulin. Pt stable for discharge to assisted living with O2.     Outpatient follow up with PMD, Endocrinologist and Pulm.

## 2020-01-08 NOTE — PROGRESS NOTE ADULT - ASSESSMENT
Assessment  DMT2: 59y Male with DM T2 with hyperglycemia, was on large dose insulin at home, now on basal bolus insulin, FS improved no hypoglycemic episode. Patient is eating meals with good appetite, resting comfortably in bed.  CAD: on medications, no chest pain, stable, monitored.  HTN: Controlled,  on antihypertensive medications.  Overweight/Obesity: No strict exercise routines, not on any weight loss program, neither on low calorie diet.          Laury Romero MD  Cell: [phone]  Office: [phone]

## 2020-01-08 NOTE — DISCHARGE NOTE PROVIDER - NSDCFUADDAPPT_GEN_ALL_CORE_FT
Follow up with PCP and endocrinology in a week. Follow up with PCP and endocrinology in a week.  ( PPD negative , Read today by RN 3/13/2020)

## 2020-01-08 NOTE — PROGRESS NOTE ADULT - PROBLEM SELECTOR PLAN 2
on empiric antibiotics: his all cultures  are negative and his RVP is negative too  1/6: no pna: has emphysema  1/7: remains stable off antibiotics

## 2020-01-09 DIAGNOSIS — F33.8 OTHER RECURRENT DEPRESSIVE DISORDERS: ICD-10-CM

## 2020-01-09 LAB
GLUCOSE BLDC GLUCOMTR-MCNC: 160 MG/DL — HIGH (ref 70–99)
GLUCOSE BLDC GLUCOMTR-MCNC: 187 MG/DL — HIGH (ref 70–99)
GLUCOSE BLDC GLUCOMTR-MCNC: 213 MG/DL — HIGH (ref 70–99)
GLUCOSE BLDC GLUCOMTR-MCNC: 271 MG/DL — HIGH (ref 70–99)
GLUCOSE BLDC GLUCOMTR-MCNC: 290 MG/DL — HIGH (ref 70–99)

## 2020-01-09 PROCEDURE — 90792 PSYCH DIAG EVAL W/MED SRVCS: CPT

## 2020-01-09 RX ORDER — CLOPIDOGREL BISULFATE 75 MG/1
1 TABLET, FILM COATED ORAL
Qty: 0 | Refills: 0 | DISCHARGE

## 2020-01-09 RX ORDER — INSULIN GLARGINE 100 [IU]/ML
50 INJECTION, SOLUTION SUBCUTANEOUS AT BEDTIME
Refills: 0 | Status: DISCONTINUED | OUTPATIENT
Start: 2020-01-09 | End: 2020-01-11

## 2020-01-09 RX ORDER — INSULIN LISPRO 100/ML
15 VIAL (ML) SUBCUTANEOUS
Refills: 0 | Status: DISCONTINUED | OUTPATIENT
Start: 2020-01-09 | End: 2020-01-11

## 2020-01-09 RX ORDER — SERTRALINE 25 MG/1
50 TABLET, FILM COATED ORAL AT BEDTIME
Refills: 0 | Status: DISCONTINUED | OUTPATIENT
Start: 2020-01-09 | End: 2020-03-13

## 2020-01-09 RX ADMIN — BUDESONIDE AND FORMOTEROL FUMARATE DIHYDRATE 2 PUFF(S): 160; 4.5 AEROSOL RESPIRATORY (INHALATION) at 17:37

## 2020-01-09 RX ADMIN — Medication 5 MILLIGRAM(S): at 22:49

## 2020-01-09 RX ADMIN — Medication 3 MILLILITER(S): at 06:23

## 2020-01-09 RX ADMIN — Medication 3 MILLILITER(S): at 17:36

## 2020-01-09 RX ADMIN — Medication 1: at 12:00

## 2020-01-09 RX ADMIN — BUDESONIDE AND FORMOTEROL FUMARATE DIHYDRATE 2 PUFF(S): 160; 4.5 AEROSOL RESPIRATORY (INHALATION) at 06:23

## 2020-01-09 RX ADMIN — Medication 1: at 17:24

## 2020-01-09 RX ADMIN — Medication 1: at 23:00

## 2020-01-09 RX ADMIN — Medication 2: at 08:20

## 2020-01-09 RX ADMIN — OXYCODONE AND ACETAMINOPHEN 2 TABLET(S): 5; 325 TABLET ORAL at 10:02

## 2020-01-09 RX ADMIN — ENOXAPARIN SODIUM 40 MILLIGRAM(S): 100 INJECTION SUBCUTANEOUS at 12:01

## 2020-01-09 RX ADMIN — INSULIN GLARGINE 50 UNIT(S): 100 INJECTION, SOLUTION SUBCUTANEOUS at 22:50

## 2020-01-09 RX ADMIN — RISPERIDONE 0.5 MILLIGRAM(S): 4 TABLET ORAL at 05:09

## 2020-01-09 RX ADMIN — CLOPIDOGREL BISULFATE 75 MILLIGRAM(S): 75 TABLET, FILM COATED ORAL at 12:00

## 2020-01-09 RX ADMIN — ATORVASTATIN CALCIUM 40 MILLIGRAM(S): 80 TABLET, FILM COATED ORAL at 22:49

## 2020-01-09 RX ADMIN — OXYCODONE AND ACETAMINOPHEN 2 TABLET(S): 5; 325 TABLET ORAL at 17:27

## 2020-01-09 RX ADMIN — RISPERIDONE 0.5 MILLIGRAM(S): 4 TABLET ORAL at 22:49

## 2020-01-09 RX ADMIN — CHLORHEXIDINE GLUCONATE 1 APPLICATION(S): 213 SOLUTION TOPICAL at 14:33

## 2020-01-09 RX ADMIN — Medication 20 MILLIGRAM(S): at 05:09

## 2020-01-09 RX ADMIN — Medication 3 MILLILITER(S): at 11:45

## 2020-01-09 RX ADMIN — OXYCODONE AND ACETAMINOPHEN 2 TABLET(S): 5; 325 TABLET ORAL at 01:36

## 2020-01-09 RX ADMIN — OXYCODONE AND ACETAMINOPHEN 2 TABLET(S): 5; 325 TABLET ORAL at 02:30

## 2020-01-09 RX ADMIN — Medication 15 UNIT(S): at 17:24

## 2020-01-09 RX ADMIN — FAMOTIDINE 20 MILLIGRAM(S): 10 INJECTION INTRAVENOUS at 12:00

## 2020-01-09 RX ADMIN — Medication 15 UNIT(S): at 11:59

## 2020-01-09 RX ADMIN — Medication 13 UNIT(S): at 08:20

## 2020-01-09 RX ADMIN — SERTRALINE 50 MILLIGRAM(S): 25 TABLET, FILM COATED ORAL at 22:49

## 2020-01-09 RX ADMIN — OXYCODONE AND ACETAMINOPHEN 2 TABLET(S): 5; 325 TABLET ORAL at 18:09

## 2020-01-09 RX ADMIN — Medication 81 MILLIGRAM(S): at 12:00

## 2020-01-09 RX ADMIN — OXYCODONE AND ACETAMINOPHEN 2 TABLET(S): 5; 325 TABLET ORAL at 14:35

## 2020-01-09 RX ADMIN — RISPERIDONE 0.5 MILLIGRAM(S): 4 TABLET ORAL at 14:30

## 2020-01-09 NOTE — PROGRESS NOTE ADULT - ASSESSMENT
Assessment  DMT2: 59y Male with DM T2 with hyperglycemia, was on large dose insulin at home, now on basal bolus insulin, increased dose yesterday, blood sugars still running high and not at target, no hypoglycemic episode. Patient is eating meals with good appetite, comfortable, selecting from list of smoking rehab facilities.  CAD: on medications, no chest pain, stable, monitored.  HTN: Controlled,  on antihypertensive medications.  Overweight/Obesity: No strict exercise routines, not on any weight loss program, neither on low calorie diet.          Laury Romero MD  Cell: [phone]  Office: [phone] Assessment  DMT2: 59y Male with DM T2 with hyperglycemia, was on large dose insulin at home, now on basal bolus insulin, increased dose yesterday,  blood sugars still running high and not at target, no hypoglycemic episode. Patient is eating meals with good appetite, comfortable, selecting from list of smoking rehab facilities.  CAD: on medications, no chest pain, stable, monitored.  HTN: Controlled,  on antihypertensive medications.  Overweight/Obesity: No strict exercise routines, not on any weight loss program, neither on low calorie diet.          Laury Romero MD  Cell: [phone]  Office: [phone]

## 2020-01-09 NOTE — BEHAVIORAL HEALTH ASSESSMENT NOTE - NSBHCHARTREVIEWVS_PSY_A_CORE FT
Vital Signs Last 24 Hrs  T(C): 36.6 (09 Jan 2020 14:27), Max: 36.6 (09 Jan 2020 14:27)  T(F): 97.8 (09 Jan 2020 14:27), Max: 97.8 (09 Jan 2020 14:27)  HR: 88 (09 Jan 2020 14:27) (75 - 96)  BP: 116/75 (09 Jan 2020 14:27) (113/73 - 120/75)  BP(mean): --  RR: 18 (09 Jan 2020 14:27) (17 - 18)  SpO2: 99% (09 Jan 2020 14:27) (96% - 99%)

## 2020-01-09 NOTE — PROGRESS NOTE ADULT - SUBJECTIVE AND OBJECTIVE BOX
Chief complaint  Patient is a 59y old  Male who presents with a chief complaint of 59M w/ n/v/d and hyperglycemia sent in from [facility] (08 Jan 2020 20:18)   Review of systems  Patient sitting up in bed, looks comfortable, no fever, no hypoglycemia.    Labs and Fingersticks  CAPILLARY BLOOD GLUCOSE      POCT Blood Glucose.: 187 mg/dL (09 Jan 2020 11:55)  POCT Blood Glucose.: 213 mg/dL (09 Jan 2020 08:09)  POCT Blood Glucose.: 201 mg/dL (08 Jan 2020 23:40)  POCT Blood Glucose.: 222 mg/dL (08 Jan 2020 17:27)                        Medications  MEDICATIONS  (STANDING):  albuterol/ipratropium for Nebulization 3 milliLiter(s) Nebulizer every 6 hours  aspirin enteric coated 81 milliGRAM(s) Oral daily  atorvastatin 40 milliGRAM(s) Oral at bedtime  budesonide 160 MICROgram(s)/formoterol 4.5 MICROgram(s) Inhaler 2 Puff(s) Inhalation two times a day  chlorhexidine 4% Liquid 1 Application(s) Topical <User Schedule>  clopidogrel Tablet 75 milliGRAM(s) Oral daily  dextrose 5%. 1000 milliLiter(s) (50 mL/Hr) IV Continuous <Continuous>  dextrose 50% Injectable 12.5 Gram(s) IV Push once  dextrose 50% Injectable 25 Gram(s) IV Push once  dextrose 50% Injectable 25 Gram(s) IV Push once  enoxaparin Injectable 40 milliGRAM(s) SubCutaneous daily  famotidine    Tablet 20 milliGRAM(s) Oral daily  furosemide    Tablet 20 milliGRAM(s) Oral daily  insulin glargine Injectable (LANTUS) 50 Unit(s) SubCutaneous at bedtime  insulin lispro (HumaLOG) corrective regimen sliding scale   SubCutaneous three times a day before meals  insulin lispro (HumaLOG) corrective regimen sliding scale   SubCutaneous at bedtime  insulin lispro Injectable (HumaLOG) 15 Unit(s) SubCutaneous three times a day with meals  melatonin 5 milliGRAM(s) Oral at bedtime  nicotine - Inhaler 1 Each Inhalation daily  risperiDONE   Tablet 0.5 milliGRAM(s) Oral three times a day      Physical Exam  General: Patient comfortable in bed  Vital Signs Last 12 Hrs  T(F): 97.8 (01-09-20 @ 14:27), Max: 97.8 (01-09-20 @ 14:27)  HR: 88 (01-09-20 @ 14:27) (75 - 88)  BP: 116/75 (01-09-20 @ 14:27) (113/73 - 116/75)  BP(mean): --  RR: 18 (01-09-20 @ 14:27) (18 - 18)  SpO2: 99% (01-09-20 @ 14:27) (96% - 99%)  Neck: No palpable thyroid nodules.  CVS: S1S2, No murmurs  Respiratory: No wheezing, no crepitations  GI: Abdomen soft, bowel sounds positive  Musculoskeletal:  edema lower extremities.   Skin: No skin rashes, no ecchymosis    Diagnostics Chief complaint  Patient is a 59y old  Male who presents with a chief complaint of 59M w/ n/v/d and hyperglycemia sent in from [facility] (08 Jan 2020 20:18)   Review of systems  Patient sitting up in bed, looks comfortable, no fever,  no hypoglycemia.    Labs and Fingersticks  CAPILLARY BLOOD GLUCOSE      POCT Blood Glucose.: 187 mg/dL (09 Jan 2020 11:55)  POCT Blood Glucose.: 213 mg/dL (09 Jan 2020 08:09)  POCT Blood Glucose.: 201 mg/dL (08 Jan 2020 23:40)  POCT Blood Glucose.: 222 mg/dL (08 Jan 2020 17:27)    Medications  MEDICATIONS  (STANDING):  albuterol/ipratropium for Nebulization 3 milliLiter(s) Nebulizer every 6 hours  aspirin enteric coated 81 milliGRAM(s) Oral daily  atorvastatin 40 milliGRAM(s) Oral at bedtime  budesonide 160 MICROgram(s)/formoterol 4.5 MICROgram(s) Inhaler 2 Puff(s) Inhalation two times a day  chlorhexidine 4% Liquid 1 Application(s) Topical <User Schedule>  clopidogrel Tablet 75 milliGRAM(s) Oral daily  dextrose 5%. 1000 milliLiter(s) (50 mL/Hr) IV Continuous <Continuous>  dextrose 50% Injectable 12.5 Gram(s) IV Push once  dextrose 50% Injectable 25 Gram(s) IV Push once  dextrose 50% Injectable 25 Gram(s) IV Push once  enoxaparin Injectable 40 milliGRAM(s) SubCutaneous daily  famotidine    Tablet 20 milliGRAM(s) Oral daily  furosemide    Tablet 20 milliGRAM(s) Oral daily  insulin glargine Injectable (LANTUS) 50 Unit(s) SubCutaneous at bedtime  insulin lispro (HumaLOG) corrective regimen sliding scale   SubCutaneous three times a day before meals  insulin lispro (HumaLOG) corrective regimen sliding scale   SubCutaneous at bedtime  insulin lispro Injectable (HumaLOG) 15 Unit(s) SubCutaneous three times a day with meals  melatonin 5 milliGRAM(s) Oral at bedtime  nicotine - Inhaler 1 Each Inhalation daily  risperiDONE   Tablet 0.5 milliGRAM(s) Oral three times a day      Physical Exam  General: Patient comfortable in bed  Vital Signs Last 12 Hrs  T(F): 97.8 (01-09-20 @ 14:27), Max: 97.8 (01-09-20 @ 14:27)  HR: 88 (01-09-20 @ 14:27) (75 - 88)  BP: 116/75 (01-09-20 @ 14:27) (113/73 - 116/75)  BP(mean): --  RR: 18 (01-09-20 @ 14:27) (18 - 18)  SpO2: 99% (01-09-20 @ 14:27) (96% - 99%)  Neck: No palpable thyroid nodules.  CVS: S1S2, No murmurs  Respiratory: No wheezing, no crepitations  GI: Abdomen soft, bowel sounds positive  Musculoskeletal:  edema lower extremities.   Skin: No skin rashes, no ecchymosis    Diagnostics

## 2020-01-09 NOTE — BEHAVIORAL HEALTH ASSESSMENT NOTE - NSBHCHARTREVIEWINVESTIGATE_PSY_A_CORE FT
< from: 12 Lead ECG (01.03.20 @ 13:23) >    Ventricular Rate 241 BPM    Atrial Rate 127 BPM    QRS Duration 158 ms    Q-T Interval 234 ms    QTC Calculation(Bezet) 468 ms    R Axis 235 degrees    T Axis 63 degrees    Diagnosis Line SINUS TACHYCARDIA  RIGHT BUNDLE BRANCH BLOCK  INFERIOR INFARCT , AGE UNDETERMINED  T WAVE ABNORMALITY, CONSIDER LATERAL ISCHEMIA  ABNORMAL ECG    Confirmed by MD NAREN, LESLEY ([id number]) on 1/4/    < end of copied text >

## 2020-01-09 NOTE — BEHAVIORAL HEALTH ASSESSMENT NOTE - RISK ASSESSMENT
Low Acute Suicide Risk low - pt has acute/chronic medical issue, low frustration, untreated mood disorder, but no si, past hospitalizations, or substance abuse, has family support and willing for treatment

## 2020-01-09 NOTE — PROGRESS NOTE ADULT - PROBLEM SELECTOR PLAN 1
Will increase Lantus to 50u at bedtime, increase Humalog to 15u before each meal, and continue Humalog correction scale coverage. Will continue monitoring FS, log, will Follow up.  Patient counseled for compliance with consistent low carb diet, exercise as tolerated as out patient. Will continue monitoring FS, log, will Follow up.  Patient counseled for compliance with consistent low carb diet, exercise as tolerated as out patient.

## 2020-01-09 NOTE — BEHAVIORAL HEALTH ASSESSMENT NOTE - NSBHCHARTREVIEWIMAGING_PSY_A_CORE FT
< from: CT Chest No Cont (01.04.20 @ 04:06) >      EXAM:  CT CHEST                            PROCEDURE DATE:  01/04/2020            INTERPRETATION:  CLINICAL INFORMATION: History of COPD presenting with severe sepsis and hypoxia.    COMPARISON: Chest CT dated 12/3/2019. CT abdomen pelvis dated 1/3/2020.    PROCEDURE:   CT of the Chest was performed without intravenous contrast.  Sagittal and coronal reformats were performed.      FINDINGS:    LUNGS AND AIRWAYS: Patent central airways. Centrilobular emphysema. Bibasilar subsegmental atelectasis. No focal consolidation or parenchymal abnormality.    PLEURA: No pleural effusion.    MEDIASTINUM AND [place]: No lymphadenopathy.    VESSELS: Atherosclerotic calcifications of the aorta and coronary arteries. Enlarged main pulmonary artery measuring 4.2 cm, compatible with pulmonary arterial hypertension.    HEART: Heart size is normal. No pericardial effusion.    CHEST WALL AND LOWER NECK: Bilateral gynecomastia.    VISUALIZED UPPER ABDOMEN: Stable 1.5 cm left adrenal nodule.    BONES: Healed bilateral rib fractures. Degenerative changes of the spine.    IMPRESSION:     Emphysema.    No lung mass or consolidation.    < end of copied text >
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
## 2020-01-09 NOTE — PROGRESS NOTE ADULT - SUBJECTIVE AND OBJECTIVE BOX
Patient is a 59y old  Male who presents with a chief complaint of 59M w/ n/v/d and hyperglycemia sent in from [facility] (09 Jan 2020 14:35)    pt. seen and examined , no c/o  INTERVAL HPI/OVERNIGHT EVENTS:  T(C): 36.4 (01-09-20 @ 20:52), Max: 36.6 (01-09-20 @ 14:27)  HR: 77 (01-10-20 @ 00:12) (75 - 88)  BP: 112/72 (01-09-20 @ 20:52) (112/72 - 116/75)  RR: 18 (01-09-20 @ 20:52) (18 - 18)  SpO2: 97% (01-10-20 @ 00:12) (96% - 99%)  Wt(kg): --  I&O's Summary    08 Jan 2020 07:01  -  09 Jan 2020 07:00  --------------------------------------------------------  IN: 480 mL / OUT: 1500 mL / NET: -1020 mL    09 Jan 2020 07:01  -  10 Piter 2020 00:50  --------------------------------------------------------  IN: 980 mL / OUT: 1125 mL / NET: -145 mL        PAST MEDICAL & SURGICAL HISTORY:  Oxygen dependent  Gastroesophageal reflux disease, esophagitis presence not specified  Smoker  Bipolar 1 disorder  Coronary artery disease involving native coronary artery of native heart without angina pectoris  Type 2 diabetes mellitus without complication, with long-term current use of insulin  Pulmonary HTN  HLD (hyperlipidemia)  Stented coronary artery  COPD (chronic obstructive pulmonary disease)  No significant past surgical history      SOCIAL HISTORY  Alcohol:  Tobacco:  Illicit substance use:    FAMILY HISTORY:    REVIEW OF SYSTEMS:  CONSTITUTIONAL: No fever, weight loss, or fatigue  EYES: No eye pain, visual disturbances, or discharge  ENMT:  No difficulty hearing, tinnitus, vertigo; No sinus or throat pain  NECK: No pain or stiffness  RESPIRATORY: No cough, wheezing, chills or hemoptysis; No shortness of breath  CARDIOVASCULAR: No chest pain, palpitations, dizziness, or leg swelling  GASTROINTESTINAL: No abdominal or epigastric pain. No nausea, vomiting, or hematemesis; No diarrhea or constipation. No melena or hematochezia.  GENITOURINARY: No dysuria, frequency, hematuria, or incontinence  NEUROLOGICAL: No headaches, memory loss, loss of strength, numbness, or tremors  SKIN: No itching, burning, rashes, or lesions   LYMPH NODES: No enlarged glands  ENDOCRINE: No heat or cold intolerance; No hair loss  MUSCULOSKELETAL: No joint pain or swelling; No muscle, back, or extremity pain  PSYCHIATRIC: No depression, anxiety, mood swings, or difficulty sleeping  HEME/LYMPH: No easy bruising, or bleeding gums  ALLERY AND IMMUNOLOGIC: No hives or eczema    RADIOLOGY & ADDITIONAL TESTS:    Imaging Personally Reviewed:  [ ] YES  [ ] NO    Consultant(s) Notes Reviewed:  [ ] YES  [ ] NO    PHYSICAL EXAM:  GENERAL: NAD, well-groomed, well-developed  HEAD:  Atraumatic, Normocephalic  EYES: EOMI, PERRLA, conjunctiva and sclera clear  ENMT: No tonsillar erythema, exudates, or enlargement; Moist mucous membranes, Good dentition, No lesions  NECK: Supple, No JVD, Normal thyroid  NERVOUS SYSTEM:  Alert & Oriented X3, Good concentration; Motor Strength 5/5 B/L upper and lower extremities; DTRs 2+ intact and symmetric  CHEST/LUNG: Clear to percussion bilaterally; No rales, rhonchi, wheezing, or rubs  HEART: Regular rate and rhythm; No murmurs, rubs, or gallops  ABDOMEN: Soft, Nontender, Nondistended; Bowel sounds present  EXTREMITIES:  2+ Peripheral Pulses, No clubbing, cyanosis, or edema  LYMPH: No lymphadenopathy noted  SKIN: No rashes or lesions    LABS:              CAPILLARY BLOOD GLUCOSE      POCT Blood Glucose.: 290 mg/dL (09 Jan 2020 22:57)  POCT Blood Glucose.: 271 mg/dL (09 Jan 2020 21:50)  POCT Blood Glucose.: 160 mg/dL (09 Jan 2020 17:08)  POCT Blood Glucose.: 187 mg/dL (09 Jan 2020 11:55)  POCT Blood Glucose.: 213 mg/dL (09 Jan 2020 08:09)            MEDICATIONS  (STANDING):  albuterol/ipratropium for Nebulization 3 milliLiter(s) Nebulizer every 6 hours  aspirin enteric coated 81 milliGRAM(s) Oral daily  atorvastatin 40 milliGRAM(s) Oral at bedtime  budesonide 160 MICROgram(s)/formoterol 4.5 MICROgram(s) Inhaler 2 Puff(s) Inhalation two times a day  chlorhexidine 4% Liquid 1 Application(s) Topical <User Schedule>  dextrose 5%. 1000 milliLiter(s) (50 mL/Hr) IV Continuous <Continuous>  dextrose 50% Injectable 12.5 Gram(s) IV Push once  dextrose 50% Injectable 25 Gram(s) IV Push once  dextrose 50% Injectable 25 Gram(s) IV Push once  enoxaparin Injectable 40 milliGRAM(s) SubCutaneous daily  famotidine    Tablet 20 milliGRAM(s) Oral daily  furosemide    Tablet 20 milliGRAM(s) Oral daily  insulin glargine Injectable (LANTUS) 50 Unit(s) SubCutaneous at bedtime  insulin lispro (HumaLOG) corrective regimen sliding scale   SubCutaneous three times a day before meals  insulin lispro (HumaLOG) corrective regimen sliding scale   SubCutaneous at bedtime  insulin lispro Injectable (HumaLOG) 15 Unit(s) SubCutaneous three times a day with meals  lisinopril 2.5 milliGRAM(s) Oral daily  melatonin 5 milliGRAM(s) Oral at bedtime  nicotine - Inhaler 1 Each Inhalation daily  risperiDONE   Tablet 0.5 milliGRAM(s) Oral three times a day  sertraline 50 milliGRAM(s) Oral at bedtime    MEDICATIONS  (PRN):  acetaminophen   Tablet .. 650 milliGRAM(s) Oral every 6 hours PRN Mild Pain (1 - 3)  dextrose 40% Gel 15 Gram(s) Oral once PRN Blood Glucose LESS THAN 70 milliGRAM(s)/deciliter  glucagon  Injectable 1 milliGRAM(s) IntraMuscular once PRN Glucose LESS THAN 70 milligrams/deciliter  oxycodone    5 mG/acetaminophen 325 mG 2 Tablet(s) Oral every 6 hours PRN Moderate Pain (4 - 6)  simethicone 80 milliGRAM(s) Chew every 12 hours PRN Indigestion      Care Discussed with Consultants/Other Providers [ ] YES  [ ] NO
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
## 2020-01-10 DIAGNOSIS — F31.9 BIPOLAR DISORDER, UNSPECIFIED: ICD-10-CM

## 2020-01-10 LAB
GLUCOSE BLDC GLUCOMTR-MCNC: 139 MG/DL — HIGH (ref 70–99)
GLUCOSE BLDC GLUCOMTR-MCNC: 199 MG/DL — HIGH (ref 70–99)
GLUCOSE BLDC GLUCOMTR-MCNC: 217 MG/DL — HIGH (ref 70–99)
GLUCOSE BLDC GLUCOMTR-MCNC: 230 MG/DL — HIGH (ref 70–99)

## 2020-01-10 RX ORDER — LISINOPRIL 2.5 MG/1
2.5 TABLET ORAL DAILY
Refills: 0 | Status: DISCONTINUED | OUTPATIENT
Start: 2020-01-10 | End: 2020-03-13

## 2020-01-10 RX ADMIN — Medication 3 MILLILITER(S): at 00:11

## 2020-01-10 RX ADMIN — OXYCODONE AND ACETAMINOPHEN 2 TABLET(S): 5; 325 TABLET ORAL at 13:00

## 2020-01-10 RX ADMIN — Medication 5 MILLIGRAM(S): at 21:54

## 2020-01-10 RX ADMIN — Medication 3 MILLILITER(S): at 12:11

## 2020-01-10 RX ADMIN — FAMOTIDINE 20 MILLIGRAM(S): 10 INJECTION INTRAVENOUS at 12:33

## 2020-01-10 RX ADMIN — BUDESONIDE AND FORMOTEROL FUMARATE DIHYDRATE 2 PUFF(S): 160; 4.5 AEROSOL RESPIRATORY (INHALATION) at 18:06

## 2020-01-10 RX ADMIN — RISPERIDONE 0.5 MILLIGRAM(S): 4 TABLET ORAL at 14:18

## 2020-01-10 RX ADMIN — OXYCODONE AND ACETAMINOPHEN 2 TABLET(S): 5; 325 TABLET ORAL at 12:31

## 2020-01-10 RX ADMIN — Medication 15 UNIT(S): at 20:16

## 2020-01-10 RX ADMIN — ENOXAPARIN SODIUM 40 MILLIGRAM(S): 100 INJECTION SUBCUTANEOUS at 12:32

## 2020-01-10 RX ADMIN — OXYCODONE AND ACETAMINOPHEN 2 TABLET(S): 5; 325 TABLET ORAL at 02:00

## 2020-01-10 RX ADMIN — LISINOPRIL 2.5 MILLIGRAM(S): 2.5 TABLET ORAL at 05:36

## 2020-01-10 RX ADMIN — Medication 15 UNIT(S): at 09:18

## 2020-01-10 RX ADMIN — BUDESONIDE AND FORMOTEROL FUMARATE DIHYDRATE 2 PUFF(S): 160; 4.5 AEROSOL RESPIRATORY (INHALATION) at 06:03

## 2020-01-10 RX ADMIN — Medication 20 MILLIGRAM(S): at 05:36

## 2020-01-10 RX ADMIN — OXYCODONE AND ACETAMINOPHEN 2 TABLET(S): 5; 325 TABLET ORAL at 20:46

## 2020-01-10 RX ADMIN — Medication 3 MILLILITER(S): at 18:07

## 2020-01-10 RX ADMIN — RISPERIDONE 0.5 MILLIGRAM(S): 4 TABLET ORAL at 21:54

## 2020-01-10 RX ADMIN — OXYCODONE AND ACETAMINOPHEN 2 TABLET(S): 5; 325 TABLET ORAL at 20:16

## 2020-01-10 RX ADMIN — Medication 2: at 14:18

## 2020-01-10 RX ADMIN — Medication 15 UNIT(S): at 14:17

## 2020-01-10 RX ADMIN — OXYCODONE AND ACETAMINOPHEN 2 TABLET(S): 5; 325 TABLET ORAL at 01:49

## 2020-01-10 RX ADMIN — SERTRALINE 50 MILLIGRAM(S): 25 TABLET, FILM COATED ORAL at 21:54

## 2020-01-10 RX ADMIN — ATORVASTATIN CALCIUM 40 MILLIGRAM(S): 80 TABLET, FILM COATED ORAL at 21:54

## 2020-01-10 RX ADMIN — INSULIN GLARGINE 50 UNIT(S): 100 INJECTION, SOLUTION SUBCUTANEOUS at 23:07

## 2020-01-10 RX ADMIN — SIMETHICONE 80 MILLIGRAM(S): 80 TABLET, CHEWABLE ORAL at 09:19

## 2020-01-10 RX ADMIN — Medication 81 MILLIGRAM(S): at 12:32

## 2020-01-10 RX ADMIN — Medication 1: at 09:19

## 2020-01-10 RX ADMIN — RISPERIDONE 0.5 MILLIGRAM(S): 4 TABLET ORAL at 05:36

## 2020-01-10 RX ADMIN — Medication 3 MILLILITER(S): at 06:03

## 2020-01-10 NOTE — PROGRESS NOTE ADULT - ASSESSMENT
Patient is a 59y old  Male with schizophrenia/bipolar disorder, panic disorder, and recent admit for RSV and COPD exacerbation, d/c 12/12/19,  Now sent in to the ER  from [facility] ([facility]) for evaluation of n/v/d and hyperglycemia. Patient reports that over last 3d he has been experiencing chills and sweating w/ nausea and NBNB vomit with loose non-bloody stool.  ON admission, he found to have  tachycardia,  tachypnea and 97% 4L NC -? placed on BiPAP and the eventually weaned to 3L NC.  The Labs is significant for Hyperglycemia, BS of 631 and hyponatremia, NA of 129. He is s/p lantus, (15:43), humalog 10U x1 SQ(14:40), insulin regular 6U IV(15:46), calcium gluconate 2g (15:45),  3L LR, duoneb 3mL x3, zosyn (16:22). The ID consult requested to assist with further evaluation and antibiotic management.     # SIRS( Tachycardia + Tachypnea) - CXR is negative  # Hyperglycemia   # Low Procalcitonin level    would recommend:    1. OOB to chair/PT  2. Monitor Off Abx   3. Supplemental oxygenation and bronchodilator  as needed      - Stable from ID stand point

## 2020-01-10 NOTE — PROGRESS NOTE ADULT - SUBJECTIVE AND OBJECTIVE BOX
Chief complaint  Patient is a 59y old  Male who presents with a chief complaint of 59M w/ n/v/d and hyperglycemia sent in from [facility] (10 Piter 2020 10:32)   Review of systems  Patient in bed, looks comfortable, no fever, no hypoglycemia.    Labs and Fingersticks  CAPILLARY BLOOD GLUCOSE      POCT Blood Glucose.: 199 mg/dL (10 Piter 2020 09:12)  POCT Blood Glucose.: 290 mg/dL (09 Jan 2020 22:57)  POCT Blood Glucose.: 271 mg/dL (09 Jan 2020 21:50)  POCT Blood Glucose.: 160 mg/dL (09 Jan 2020 17:08)                        Medications  MEDICATIONS  (STANDING):  albuterol/ipratropium for Nebulization 3 milliLiter(s) Nebulizer every 6 hours  aspirin enteric coated 81 milliGRAM(s) Oral daily  atorvastatin 40 milliGRAM(s) Oral at bedtime  budesonide 160 MICROgram(s)/formoterol 4.5 MICROgram(s) Inhaler 2 Puff(s) Inhalation two times a day  chlorhexidine 4% Liquid 1 Application(s) Topical <User Schedule>  dextrose 5%. 1000 milliLiter(s) (50 mL/Hr) IV Continuous <Continuous>  dextrose 50% Injectable 12.5 Gram(s) IV Push once  dextrose 50% Injectable 25 Gram(s) IV Push once  dextrose 50% Injectable 25 Gram(s) IV Push once  enoxaparin Injectable 40 milliGRAM(s) SubCutaneous daily  famotidine    Tablet 20 milliGRAM(s) Oral daily  furosemide    Tablet 20 milliGRAM(s) Oral daily  insulin glargine Injectable (LANTUS) 50 Unit(s) SubCutaneous at bedtime  insulin lispro (HumaLOG) corrective regimen sliding scale   SubCutaneous three times a day before meals  insulin lispro (HumaLOG) corrective regimen sliding scale   SubCutaneous at bedtime  insulin lispro Injectable (HumaLOG) 15 Unit(s) SubCutaneous three times a day with meals  lisinopril 2.5 milliGRAM(s) Oral daily  melatonin 5 milliGRAM(s) Oral at bedtime  nicotine - Inhaler 1 Each Inhalation daily  risperiDONE   Tablet 0.5 milliGRAM(s) Oral three times a day  sertraline 50 milliGRAM(s) Oral at bedtime      Physical Exam  General: Patient comfortable in bed  Vital Signs Last 12 Hrs  T(F): 98.1 (01-10-20 @ 04:37), Max: 98.1 (01-10-20 @ 04:37)  HR: 79 (01-10-20 @ 12:12) (68 - 80)  BP: 104/59 (01-10-20 @ 04:37) (104/59 - 104/59)  BP(mean): --  RR: 18 (01-10-20 @ 04:37) (18 - 18)  SpO2: 97% (01-10-20 @ 12:12) (97% - 97%)  Neck: No palpable thyroid nodules.  CVS: S1S2, No murmurs  Respiratory: No wheezing, no crepitations  GI: Abdomen soft, bowel sounds positive  Musculoskeletal:  edema lower extremities.   Skin: No skin rashes, no ecchymosis    Diagnostics Chief complaint  Patient is a 59y old  Male who presents with a chief complaint of 59M w/ n/v/d and hyperglycemia sent in from [facility] (10 Piter 2020 10:32)   Review of systems  Patient in bed, looks comfortable, no fever,  no hypoglycemia.    Labs and Fingersticks  CAPILLARY BLOOD GLUCOSE      POCT Blood Glucose.: 199 mg/dL (10 Piter 2020 09:12)  POCT Blood Glucose.: 290 mg/dL (09 Jan 2020 22:57)  POCT Blood Glucose.: 271 mg/dL (09 Jan 2020 21:50)  POCT Blood Glucose.: 160 mg/dL (09 Jan 2020 17:08)                        Medications  MEDICATIONS  (STANDING):  albuterol/ipratropium for Nebulization 3 milliLiter(s) Nebulizer every 6 hours  aspirin enteric coated 81 milliGRAM(s) Oral daily  atorvastatin 40 milliGRAM(s) Oral at bedtime  budesonide 160 MICROgram(s)/formoterol 4.5 MICROgram(s) Inhaler 2 Puff(s) Inhalation two times a day  chlorhexidine 4% Liquid 1 Application(s) Topical <User Schedule>  dextrose 5%. 1000 milliLiter(s) (50 mL/Hr) IV Continuous <Continuous>  dextrose 50% Injectable 12.5 Gram(s) IV Push once  dextrose 50% Injectable 25 Gram(s) IV Push once  dextrose 50% Injectable 25 Gram(s) IV Push once  enoxaparin Injectable 40 milliGRAM(s) SubCutaneous daily  famotidine    Tablet 20 milliGRAM(s) Oral daily  furosemide    Tablet 20 milliGRAM(s) Oral daily  insulin glargine Injectable (LANTUS) 50 Unit(s) SubCutaneous at bedtime  insulin lispro (HumaLOG) corrective regimen sliding scale   SubCutaneous three times a day before meals  insulin lispro (HumaLOG) corrective regimen sliding scale   SubCutaneous at bedtime  insulin lispro Injectable (HumaLOG) 15 Unit(s) SubCutaneous three times a day with meals  lisinopril 2.5 milliGRAM(s) Oral daily  melatonin 5 milliGRAM(s) Oral at bedtime  nicotine - Inhaler 1 Each Inhalation daily  risperiDONE   Tablet 0.5 milliGRAM(s) Oral three times a day  sertraline 50 milliGRAM(s) Oral at bedtime      Physical Exam  General: Patient comfortable in bed  Vital Signs Last 12 Hrs  T(F): 98.1 (01-10-20 @ 04:37), Max: 98.1 (01-10-20 @ 04:37)  HR: 79 (01-10-20 @ 12:12) (68 - 80)  BP: 104/59 (01-10-20 @ 04:37) (104/59 - 104/59)  BP(mean): --  RR: 18 (01-10-20 @ 04:37) (18 - 18)  SpO2: 97% (01-10-20 @ 12:12) (97% - 97%)  Neck: No palpable thyroid nodules.  CVS: S1S2, No murmurs  Respiratory: No wheezing, no crepitations  GI: Abdomen soft, bowel sounds positive  Musculoskeletal:  edema lower extremities.   Skin: No skin rashes, no ecchymosis    Diagnostics

## 2020-01-10 NOTE — PROGRESS NOTE ADULT - PROBLEM SELECTOR PLAN 1
DM with hyperglycemia: Cont insulin:per endo  1/6: better  1/8: much improved: fels OK:  1/10: resolved:

## 2020-01-10 NOTE — PROGRESS NOTE ADULT - SUBJECTIVE AND OBJECTIVE BOX
Patient is seen and examined at the bed side, remains afebrile. He has no new complaints, awaiting for NH placement.       REVIEW OF SYSTEMS: All other review systems are negative        ALLERGIES: No Known Allergies      Vital Signs Last 24 Hrs  T(C): 36.7 (10 Piter 2020 04:37), Max: 36.7 (10 Piter 2020 04:37)  T(F): 98.1 (10 Piter 2020 04:37), Max: 98.1 (10 Piter 2020 04:37)  HR: 68 (10 Piter 2020 04:37) (68 - 88)  BP: 104/59 (10 Piter 2020 04:37) (104/59 - 116/75)  BP(mean): --  RR: 18 (10 Piter 2020 04:37) (18 - 18)  SpO2: 97% (10 Piter 2020 04:37) (96% - 99%)        PHYSICAL EXAM:  GENERAL: Not in distress, on oxygen  via NC  CHEST/LUNG:  Air entry bilaterally  HEART: s1 and s2 present  ABDOMEN:  Nontender and  Nondistended  EXTREMITIES: No pedal  edema  CNS: Awake and Alert        LABS: No new Labs                         11.7   7.68  )-----------( 160      ( 2020 05:09 )             36.8           01-05    138  |  95<L>  |  14  ----------------------------<  199<H>  3.8   |  27  |  0.67    Ca    7.9<L>      2020 06:48    TPro  5.7<L>  /  Alb  3.4  /  TBili  0.3  /  DBili  x   /  AST  21  /  ALT  28  /  AlkPhos  53  01-04    PT/INR - ( 2020 03:44 )   PT: 12.4 sec;   INR: 1.08 ratio          CAPILLARY BLOOD GLUCOSE  POCT Blood Glucose.: 191 mg/dL (2020 16:45)  POCT Blood Glucose.: 248 mg/dL (2020 13:50)  POCT Blood Glucose.: 211 mg/dL (2020 09:57)      ABG - ( 2020 06:53 )  pH, Arterial: 7.41  pH, Blood: x     /  pCO2: 46    /  pO2: 80    / HCO3: 29    / Base Excess: 4.2   /  SaO2: 97            Urinalysis Basic - ( 2020 22:18 )  Color: Light Yellow / Appearance: Clear / S.035 / pH: x  Gluc: x / Ketone: Small  / Bili: Negative / Urobili: Negative   Blood: x / Protein: Negative / Nitrite: Negative   Leuk Esterase: Negative / RBC: 11 /hpf / WBC 0 /HPF   Sq Epi: x / Non Sq Epi: 0 /hpf / Bacteria: Negative      Procalcitonin, Serum (20 @ 06:38)    Procalcitonin, Serum: 0.18: This assay is intended for use to determine the change of PCT over time  as an aid in assessing the cumulative 28-day risk of all-cause mortality  for patients diagnosed with severe sepsis or septic shock in the ICU, or  when obtained in the emergency department or other medical wards prior to  ICU admission. This assay was performed by the Roche PipewiseS PCT  assay. ng/mL        MEDICATIONS  (STANDING):  albuterol/ipratropium for Nebulization 3 milliLiter(s) Nebulizer every 6 hours  aspirin enteric coated 81 milliGRAM(s) Oral daily  atorvastatin 40 milliGRAM(s) Oral at bedtime  budesonide 160 MICROgram(s)/formoterol 4.5 MICROgram(s) Inhaler 2 Puff(s) Inhalation two times a day  chlorhexidine 4% Liquid 1 Application(s) Topical <User Schedule>  dextrose 5%. 1000 milliLiter(s) (50 mL/Hr) IV Continuous <Continuous>  dextrose 50% Injectable 12.5 Gram(s) IV Push once  dextrose 50% Injectable 25 Gram(s) IV Push once  dextrose 50% Injectable 25 Gram(s) IV Push once  enoxaparin Injectable 40 milliGRAM(s) SubCutaneous daily  famotidine    Tablet 20 milliGRAM(s) Oral daily  furosemide    Tablet 20 milliGRAM(s) Oral daily  insulin glargine Injectable (LANTUS) 50 Unit(s) SubCutaneous at bedtime  insulin lispro (HumaLOG) corrective regimen sliding scale   SubCutaneous three times a day before meals  insulin lispro (HumaLOG) corrective regimen sliding scale   SubCutaneous at bedtime  insulin lispro Injectable (HumaLOG) 15 Unit(s) SubCutaneous three times a day with meals  lisinopril 2.5 milliGRAM(s) Oral daily  melatonin 5 milliGRAM(s) Oral at bedtime  nicotine - Inhaler 1 Each Inhalation daily  risperiDONE   Tablet 0.5 milliGRAM(s) Oral three times a day  sertraline 50 milliGRAM(s) Oral at bedtime    MEDICATIONS  (PRN):  acetaminophen   Tablet .. 650 milliGRAM(s) Oral every 6 hours PRN Mild Pain (1 - 3)  dextrose 40% Gel 15 Gram(s) Oral once PRN Blood Glucose LESS THAN 70 milliGRAM(s)/deciliter  glucagon  Injectable 1 milliGRAM(s) IntraMuscular once PRN Glucose LESS THAN 70 milligrams/deciliter  oxycodone    5 mG/acetaminophen 325 mG 2 Tablet(s) Oral every 6 hours PRN Moderate Pain (4 - 6)  simethicone 80 milliGRAM(s) Chew every 12 hours PRN Indigestion          RADIOLOGY & ADDITIONAL TESTS:        20: CT Chest No Cont (20 @ 04:06) No lung mass or consolidation.          MICROBIOLOGY DATA:      Culture - Urine (20 @ 00:37)    Specimen Source: .Urine Clean Catch (Midstream)    Culture Results:   No growth      Culture - Blood (20 @ 18:03)    Specimen Source: .Blood Blood-Peripheral    Culture Results:   No growth to date.      Culture - Blood (20 @ 18:03)    Specimen Source: .Blood Blood-Peripheral    Culture Results:   No growth to date.    FLU A B RSV Detection by PCR (20 @ 14:11)    Flu A Result: NotDetec: The Flu A B RSV assay is a Real-Time PCR test for the qualitative  detection and differentiation of Influenza A, Influenza B, and  Respiratory Syncytial Virus on nasopharyngeal swabs. The results should  be interpreted in the context of all clinical and laboratory findings.    Flu B Result: NotDetec    RSV Result: NotDetec

## 2020-01-10 NOTE — PROGRESS NOTE ADULT - PROBLEM SELECTOR PLAN 1
Will continue current insulin regimen. Will continue monitoring FS, log, will Follow up.  Patient counseled for compliance with consistent low carb diet, exercise as tolerated as out patient. Will continue current insulin regimen. Will continue monitoring FS, log, will Follow up.

## 2020-01-10 NOTE — PROGRESS NOTE ADULT - SUBJECTIVE AND OBJECTIVE BOX
Patient is a 59y old  Male who presents with a chief complaint of 59M w/ n/v/d and hyperglycemia sent in from [facility] (09 Jan 2020 22:50)      Interval Events:    REVIEW OF SYSTEMS:  [ ] Positive  [ ] All other systems negative  [ ] Unable to assess ROS because ________    Vital Signs Last 24 Hrs  T(C): 36.7 (01-10-20 @ 04:37), Max: 36.7 (01-10-20 @ 04:37)  T(F): 98.1 (01-10-20 @ 04:37), Max: 98.1 (01-10-20 @ 04:37)  HR: 68 (01-10-20 @ 04:37) (68 - 88)  BP: 104/59 (01-10-20 @ 04:37) (104/59 - 116/75)  RR: 18 (01-10-20 @ 04:37) (18 - 18)  SpO2: 97% (01-10-20 @ 04:37) (96% - 99%)    PHYSICAL EXAM:  HEENT:   [ ]Tracheostomy:  [ ]Pupils equal  [ ]No oral lesions  [ ]Abnormal    SKIN  [ ]No Rash  [ ] Abnormal  [ ] pressure    CARDIAC  [ ]Regular  [ ]Abnormal    PULMONARY  [ ]Bilateral Clear Breath Sounds  [ ]Normal Excursion  [ ]Abnormal    GI  [ ]PEG      [ ] +BS		              [ ]Soft, nondistended, nontender	  [ ]Abnormal    MUSCULOSKELETAL                                   [ ]Bedbound                 [ ]Abnormal    [ ]Ambulatory/OOB to chair                           EXTREMITIES                                         [ ]Normal  [ ]Edema                           NEUROLOGIC  [ ] Normal, non focal  [ ] Focal findings:    PSYCHIATRIC  [ ]Alert and appropriate  [ ] Sedated	 [ ]Agitated    :  [place]: [ ] Yes, if yes: Date of Placement:                   [  ] No    LINES: Central Lines [ ] Yes, if yes: Date of Placement                                     [  ] No    HOSPITAL MEDICATIONS:  MEDICATIONS  (STANDING):  albuterol/ipratropium for Nebulization 3 milliLiter(s) Nebulizer every 6 hours  aspirin enteric coated 81 milliGRAM(s) Oral daily  atorvastatin 40 milliGRAM(s) Oral at bedtime  budesonide 160 MICROgram(s)/formoterol 4.5 MICROgram(s) Inhaler 2 Puff(s) Inhalation two times a day  chlorhexidine 4% Liquid 1 Application(s) Topical <User Schedule>  dextrose 5%. 1000 milliLiter(s) (50 mL/Hr) IV Continuous <Continuous>  dextrose 50% Injectable 12.5 Gram(s) IV Push once  dextrose 50% Injectable 25 Gram(s) IV Push once  dextrose 50% Injectable 25 Gram(s) IV Push once  enoxaparin Injectable 40 milliGRAM(s) SubCutaneous daily  famotidine    Tablet 20 milliGRAM(s) Oral daily  furosemide    Tablet 20 milliGRAM(s) Oral daily  insulin glargine Injectable (LANTUS) 50 Unit(s) SubCutaneous at bedtime  insulin lispro (HumaLOG) corrective regimen sliding scale   SubCutaneous three times a day before meals  insulin lispro (HumaLOG) corrective regimen sliding scale   SubCutaneous at bedtime  insulin lispro Injectable (HumaLOG) 15 Unit(s) SubCutaneous three times a day with meals  lisinopril 2.5 milliGRAM(s) Oral daily  melatonin 5 milliGRAM(s) Oral at bedtime  nicotine - Inhaler 1 Each Inhalation daily  risperiDONE   Tablet 0.5 milliGRAM(s) Oral three times a day  sertraline 50 milliGRAM(s) Oral at bedtime    MEDICATIONS  (PRN):  acetaminophen   Tablet .. 650 milliGRAM(s) Oral every 6 hours PRN Mild Pain (1 - 3)  dextrose 40% Gel 15 Gram(s) Oral once PRN Blood Glucose LESS THAN 70 milliGRAM(s)/deciliter  glucagon  Injectable 1 milliGRAM(s) IntraMuscular once PRN Glucose LESS THAN 70 milligrams/deciliter  oxycodone    5 mG/acetaminophen 325 mG 2 Tablet(s) Oral every 6 hours PRN Moderate Pain (4 - 6)  simethicone 80 milliGRAM(s) Chew every 12 hours PRN Indigestion      LABS:                  CAPILLARY BLOOD GLUCOSE    MICROBIOLOGY:     RADIOLOGY:  [ ] Reviewed and interpreted by me

## 2020-01-10 NOTE — PROGRESS NOTE ADULT - SUBJECTIVE AND OBJECTIVE BOX
Patient is a 59y old  Male who presents with a chief complaint of 59M w/ n/v/d and hyperglycemia sent in from [facility] (09 Jan 2020 22:50)      Any change in ROS: Doingok : no SOB     MEDICATIONS  (STANDING):  albuterol/ipratropium for Nebulization 3 milliLiter(s) Nebulizer every 6 hours  aspirin enteric coated 81 milliGRAM(s) Oral daily  atorvastatin 40 milliGRAM(s) Oral at bedtime  budesonide 160 MICROgram(s)/formoterol 4.5 MICROgram(s) Inhaler 2 Puff(s) Inhalation two times a day  chlorhexidine 4% Liquid 1 Application(s) Topical <User Schedule>  dextrose 5%. 1000 milliLiter(s) (50 mL/Hr) IV Continuous <Continuous>  dextrose 50% Injectable 12.5 Gram(s) IV Push once  dextrose 50% Injectable 25 Gram(s) IV Push once  dextrose 50% Injectable 25 Gram(s) IV Push once  enoxaparin Injectable 40 milliGRAM(s) SubCutaneous daily  famotidine    Tablet 20 milliGRAM(s) Oral daily  furosemide    Tablet 20 milliGRAM(s) Oral daily  insulin glargine Injectable (LANTUS) 50 Unit(s) SubCutaneous at bedtime  insulin lispro (HumaLOG) corrective regimen sliding scale   SubCutaneous three times a day before meals  insulin lispro (HumaLOG) corrective regimen sliding scale   SubCutaneous at bedtime  insulin lispro Injectable (HumaLOG) 15 Unit(s) SubCutaneous three times a day with meals  lisinopril 2.5 milliGRAM(s) Oral daily  melatonin 5 milliGRAM(s) Oral at bedtime  nicotine - Inhaler 1 Each Inhalation daily  risperiDONE   Tablet 0.5 milliGRAM(s) Oral three times a day  sertraline 50 milliGRAM(s) Oral at bedtime    MEDICATIONS  (PRN):  acetaminophen   Tablet .. 650 milliGRAM(s) Oral every 6 hours PRN Mild Pain (1 - 3)  dextrose 40% Gel 15 Gram(s) Oral once PRN Blood Glucose LESS THAN 70 milliGRAM(s)/deciliter  glucagon  Injectable 1 milliGRAM(s) IntraMuscular once PRN Glucose LESS THAN 70 milligrams/deciliter  oxycodone    5 mG/acetaminophen 325 mG 2 Tablet(s) Oral every 6 hours PRN Moderate Pain (4 - 6)  simethicone 80 milliGRAM(s) Chew every 12 hours PRN Indigestion    Vital Signs Last 24 Hrs  T(C): 36.7 (10 Piter 2020 04:37), Max: 36.7 (10 Piter 2020 04:37)  T(F): 98.1 (10 Piter 2020 04:37), Max: 98.1 (10 Piter 2020 04:37)  HR: 68 (10 Piter 2020 04:37) (68 - 88)  BP: 104/59 (10 Piter 2020 04:37) (104/59 - 116/75)  BP(mean): --  RR: 18 (10 Piter 2020 04:37) (18 - 18)  SpO2: 97% (10 Piter 2020 04:37) (96% - 99%)    I&O's Summary    09 Jan 2020 07:01  -  10 Piter 2020 07:00  --------------------------------------------------------  IN: 980 mL / OUT: 1125 mL / NET: -145 mL          Physical Exam:   GENERAL: NAD, well-groomed, well-developed  HEENT: GALO/   Atraumatic, Normocephalic  ENMT: No tonsillar erythema, exudates, or enlargement; Moist mucous membranes, Good dentition, No lesions  NECK: Supple, No JVD, Normal thyroid  CHEST/LUNG: Clear to auscultaion  CVS: Regular rate and rhythm; No murmurs, rubs, or gallops  GI: : Soft, Nontender, Nondistended; Bowel sounds present  NERVOUS SYSTEM:  Alert & Oriented X3  EXTREMITIES:  2+ Peripheral Pulses, No clubbing, cyanosis, or edema  LYMPH: No lymphadenopathy noted  SKIN: No rashes or lesions  ENDOCRINOLOGY: No Thyromegaly  PSYCH: Appropriate    Labs:  UNK, 30, 28              CAPILLARY BLOOD GLUCOSE      POCT Blood Glucose.: 199 mg/dL (10 Piter 2020 09:12)  POCT Blood Glucose.: 290 mg/dL (09 Jan 2020 22:57)  POCT Blood Glucose.: 271 mg/dL (09 Jan 2020 21:50)  POCT Blood Glucose.: 160 mg/dL (09 Jan 2020 17:08)  POCT Blood Glucose.: 187 mg/dL (09 Jan 2020 11:55)            Procalcitonin, Serum: 0.18 ng/mL (01-07 @ 06:38)        RECENT CULTURES:  01-04 @ 00:37 .Urine Clean Catch (Midstream)         < from: CT Chest No Cont (01.04.20 @ 04:06) >    VESSELS: Atherosclerotic calcifications of the aorta and coronary arteries. Enlarged main pulmonary artery measuring 4.2 cm, compatible with pulmonary arterial hypertension.    HEART: Heart size is normal. No pericardial effusion.    CHEST WALL AND LOWER NECK: Bilateral gynecomastia.    VISUALIZED UPPER ABDOMEN: Stable 1.5 cm left adrenal nodule.    BONES: Healed bilateral rib fractures. Degenerative changes of the spine.    IMPRESSION:     Emphysema.    No lung mass or consolidation.                    LES GIBSON M.D., RADIOLOGY RESIDENT  This document has been electronically signed.  MAYDA GRIFFITH M.D., ATTENDING RADIOLOGIST  This document has been electronically signed. Jan 4 2020 10:01AM        < end of copied text >         No growth    01-03 @ 18:03 .Blood Blood-Peripheral                No growth at 5 days.          RESPIRATORY CULTURES:          Studies  Chest X-RAY  CT SCAN Chest   Venous Dopplers: LE:   CT Abdomen  Others

## 2020-01-10 NOTE — PROGRESS NOTE ADULT - PROBLEM SELECTOR PLAN 2
on empiric antibiotics: his all cultures  are negative and his RVP is negative too  1/6: no pna: has emphysema  1/7: remains stable off antibiotics  1/10: no resp symtoms: off antibiotics

## 2020-01-10 NOTE — PROGRESS NOTE ADULT - ASSESSMENT
Assessment  DMT2: 59y Male with DM T2 with hyperglycemia, was on large dose insulin at home, now on basal bolus insulin, increased dose yesterday, blood sugars improving, fluctuating, no hypoglycemic episode. Patient is eating meals with good appetite, comfortable and alert.  CAD: on medications, no chest pain, stable, monitored.  HTN: Controlled,  on antihypertensive medications.  Overweight/Obesity: No strict exercise routines, not on any weight loss program, neither on low calorie diet.          Laury Romero MD  Cell: [phone]  Office: [phone] Assessment  DMT2: 59y Male with DM T2 with hyperglycemia, was on large dose insulin at home, now on basal bolus insulin, increased dose yesterday,  blood sugars improving, fluctuating, no hypoglycemic episode. Patient is eating meals with good appetite, comfortable and alert.  CAD: on medications, no chest pain, stable, monitored.  HTN: Controlled,  on antihypertensive medications.  Overweight/Obesity: No strict exercise routines, not on any weight loss program, neither on low calorie diet.          Laury Romero MD  Cell: [phone]  Office: [phone]

## 2020-01-10 NOTE — PROGRESS NOTE ADULT - SUBJECTIVE AND OBJECTIVE BOX
Patient is a 59y old  Male who presents with a chief complaint of 59M w/ n/v/d and hyperglycemia sent in from [facility] (10 Piter 2020 13:17)    pt. seen and examined, doing fair , denies any c/o     INTERVAL HPI/OVERNIGHT EVENTS:  T(C): 36.4 (01-10-20 @ 14:08), Max: 36.7 (01-10-20 @ 04:37)  HR: 77 (01-10-20 @ 18:07) (68 - 87)  BP: 122/78 (01-10-20 @ 14:08) (104/59 - 122/78)  RR: 18 (01-10-20 @ 14:08) (18 - 18)  SpO2: 97% (01-10-20 @ 18:07) (97% - 99%)  Wt(kg): --  I&O's Summary    09 Jan 2020 07:01  -  10 Piter 2020 07:00  --------------------------------------------------------  IN: 980 mL / OUT: 1125 mL / NET: -145 mL    10 Piter 2020 07:01  -  10 Piter 2020 19:22  --------------------------------------------------------  IN: 0 mL / OUT: 700 mL / NET: -700 mL        PAST MEDICAL & SURGICAL HISTORY:  Oxygen dependent  Gastroesophageal reflux disease, esophagitis presence not specified  Smoker  Bipolar 1 disorder  Coronary artery disease involving native coronary artery of native heart without angina pectoris  Type 2 diabetes mellitus without complication, with long-term current use of insulin  Pulmonary HTN  HLD (hyperlipidemia)  Stented coronary artery  COPD (chronic obstructive pulmonary disease)  No significant past surgical history      SOCIAL HISTORY  Alcohol:  Tobacco:  Illicit substance use:    FAMILY HISTORY:    REVIEW OF SYSTEMS:  CONSTITUTIONAL: No fever, weight loss, or fatigue  EYES: No eye pain, visual disturbances, or discharge  ENMT:  No difficulty hearing, tinnitus, vertigo; No sinus or throat pain  NECK: No pain or stiffness  RESPIRATORY: No cough, wheezing, chills or hemoptysis; No shortness of breath  CARDIOVASCULAR: No chest pain, palpitations, dizziness, or leg swelling  GASTROINTESTINAL: No abdominal or epigastric pain. No nausea, vomiting, or hematemesis; No diarrhea or constipation. No melena or hematochezia.  GENITOURINARY: No dysuria, frequency, hematuria, or incontinence  NEUROLOGICAL: No headaches, memory loss, loss of strength, numbness, or tremors  SKIN: No itching, burning, rashes, or lesions   LYMPH NODES: No enlarged glands  ENDOCRINE: No heat or cold intolerance; No hair loss  MUSCULOSKELETAL: No joint pain or swelling; No muscle, back, or extremity pain  PSYCHIATRIC: No depression, anxiety, mood swings, or difficulty sleeping  HEME/LYMPH: No easy bruising, or bleeding gums  ALLERY AND IMMUNOLOGIC: No hives or eczema    RADIOLOGY & ADDITIONAL TESTS:    Imaging Personally Reviewed:  [ ] YES  [ ] NO    Consultant(s) Notes Reviewed:  [ ] YES  [ ] NO    PHYSICAL EXAM:  GENERAL: NAD, well-groomed, well-developed  HEAD:  Atraumatic, Normocephalic  EYES: EOMI, PERRLA, conjunctiva and sclera clear  ENMT: No tonsillar erythema, exudates, or enlargement; Moist mucous membranes, Good dentition, No lesions  NECK: Supple, No JVD, Normal thyroid  NERVOUS SYSTEM:  Alert & Oriented X3, Good concentration; Motor Strength 5/5 B/L upper and lower extremities; DTRs 2+ intact and symmetric  CHEST/LUNG: Clear to percussion bilaterally; No rales, rhonchi, wheezing, or rubs  HEART: Regular rate and rhythm; No murmurs, rubs, or gallops  ABDOMEN: Soft, Nontender, Nondistended; Bowel sounds present  EXTREMITIES:  2+ Peripheral Pulses, No clubbing, cyanosis, or edema  LYMPH: No lymphadenopathy noted  SKIN: No rashes or lesions    LABS:              CAPILLARY BLOOD GLUCOSE      POCT Blood Glucose.: 217 mg/dL (10 Pietr 2020 13:56)  POCT Blood Glucose.: 199 mg/dL (10 Piter 2020 09:12)  POCT Blood Glucose.: 290 mg/dL (09 Jan 2020 22:57)  POCT Blood Glucose.: 271 mg/dL (09 Jan 2020 21:50)            MEDICATIONS  (STANDING):  albuterol/ipratropium for Nebulization 3 milliLiter(s) Nebulizer every 6 hours  aspirin enteric coated 81 milliGRAM(s) Oral daily  atorvastatin 40 milliGRAM(s) Oral at bedtime  budesonide 160 MICROgram(s)/formoterol 4.5 MICROgram(s) Inhaler 2 Puff(s) Inhalation two times a day  chlorhexidine 4% Liquid 1 Application(s) Topical <User Schedule>  dextrose 5%. 1000 milliLiter(s) (50 mL/Hr) IV Continuous <Continuous>  dextrose 50% Injectable 12.5 Gram(s) IV Push once  dextrose 50% Injectable 25 Gram(s) IV Push once  dextrose 50% Injectable 25 Gram(s) IV Push once  enoxaparin Injectable 40 milliGRAM(s) SubCutaneous daily  famotidine    Tablet 20 milliGRAM(s) Oral daily  furosemide    Tablet 20 milliGRAM(s) Oral daily  insulin glargine Injectable (LANTUS) 50 Unit(s) SubCutaneous at bedtime  insulin lispro (HumaLOG) corrective regimen sliding scale   SubCutaneous three times a day before meals  insulin lispro (HumaLOG) corrective regimen sliding scale   SubCutaneous at bedtime  insulin lispro Injectable (HumaLOG) 15 Unit(s) SubCutaneous three times a day with meals  lisinopril 2.5 milliGRAM(s) Oral daily  melatonin 5 milliGRAM(s) Oral at bedtime  nicotine - Inhaler 1 Each Inhalation daily  risperiDONE   Tablet 0.5 milliGRAM(s) Oral three times a day  sertraline 50 milliGRAM(s) Oral at bedtime    MEDICATIONS  (PRN):  acetaminophen   Tablet .. 650 milliGRAM(s) Oral every 6 hours PRN Mild Pain (1 - 3)  dextrose 40% Gel 15 Gram(s) Oral once PRN Blood Glucose LESS THAN 70 milliGRAM(s)/deciliter  glucagon  Injectable 1 milliGRAM(s) IntraMuscular once PRN Glucose LESS THAN 70 milligrams/deciliter  oxycodone    5 mG/acetaminophen 325 mG 2 Tablet(s) Oral every 6 hours PRN Moderate Pain (4 - 6)  simethicone 80 milliGRAM(s) Chew every 12 hours PRN Indigestion      Care Discussed with Consultants/Other Providers [ ] YES  [ ] NO

## 2020-01-11 LAB
GLUCOSE BLDC GLUCOMTR-MCNC: 208 MG/DL — HIGH (ref 70–99)
GLUCOSE BLDC GLUCOMTR-MCNC: 216 MG/DL — HIGH (ref 70–99)
GLUCOSE BLDC GLUCOMTR-MCNC: 229 MG/DL — HIGH (ref 70–99)
GLUCOSE BLDC GLUCOMTR-MCNC: 268 MG/DL — HIGH (ref 70–99)

## 2020-01-11 RX ORDER — INSULIN GLARGINE 100 [IU]/ML
56 INJECTION, SOLUTION SUBCUTANEOUS AT BEDTIME
Refills: 0 | Status: DISCONTINUED | OUTPATIENT
Start: 2020-01-11 | End: 2020-01-13

## 2020-01-11 RX ORDER — INSULIN LISPRO 100/ML
18 VIAL (ML) SUBCUTANEOUS
Refills: 0 | Status: DISCONTINUED | OUTPATIENT
Start: 2020-01-11 | End: 2020-01-13

## 2020-01-11 RX ADMIN — INSULIN GLARGINE 56 UNIT(S): 100 INJECTION, SOLUTION SUBCUTANEOUS at 22:16

## 2020-01-11 RX ADMIN — Medication 3: at 09:32

## 2020-01-11 RX ADMIN — OXYCODONE AND ACETAMINOPHEN 2 TABLET(S): 5; 325 TABLET ORAL at 15:45

## 2020-01-11 RX ADMIN — Medication 1 EACH: at 14:33

## 2020-01-11 RX ADMIN — RISPERIDONE 0.5 MILLIGRAM(S): 4 TABLET ORAL at 14:31

## 2020-01-11 RX ADMIN — FAMOTIDINE 20 MILLIGRAM(S): 10 INJECTION INTRAVENOUS at 14:32

## 2020-01-11 RX ADMIN — OXYCODONE AND ACETAMINOPHEN 2 TABLET(S): 5; 325 TABLET ORAL at 03:34

## 2020-01-11 RX ADMIN — SIMETHICONE 80 MILLIGRAM(S): 80 TABLET, CHEWABLE ORAL at 14:31

## 2020-01-11 RX ADMIN — LISINOPRIL 2.5 MILLIGRAM(S): 2.5 TABLET ORAL at 06:34

## 2020-01-11 RX ADMIN — Medication 15 UNIT(S): at 09:31

## 2020-01-11 RX ADMIN — OXYCODONE AND ACETAMINOPHEN 2 TABLET(S): 5; 325 TABLET ORAL at 04:04

## 2020-01-11 RX ADMIN — Medication 3 MILLILITER(S): at 05:30

## 2020-01-11 RX ADMIN — Medication 20 MILLIGRAM(S): at 06:34

## 2020-01-11 RX ADMIN — Medication 15 UNIT(S): at 14:31

## 2020-01-11 RX ADMIN — OXYCODONE AND ACETAMINOPHEN 2 TABLET(S): 5; 325 TABLET ORAL at 09:37

## 2020-01-11 RX ADMIN — Medication 30 MILLILITER(S): at 22:15

## 2020-01-11 RX ADMIN — RISPERIDONE 0.5 MILLIGRAM(S): 4 TABLET ORAL at 22:06

## 2020-01-11 RX ADMIN — SERTRALINE 50 MILLIGRAM(S): 25 TABLET, FILM COATED ORAL at 22:06

## 2020-01-11 RX ADMIN — ENOXAPARIN SODIUM 40 MILLIGRAM(S): 100 INJECTION SUBCUTANEOUS at 14:30

## 2020-01-11 RX ADMIN — Medication 2: at 14:31

## 2020-01-11 RX ADMIN — Medication 3 MILLILITER(S): at 11:56

## 2020-01-11 RX ADMIN — Medication 18 UNIT(S): at 18:16

## 2020-01-11 RX ADMIN — RISPERIDONE 0.5 MILLIGRAM(S): 4 TABLET ORAL at 06:34

## 2020-01-11 RX ADMIN — OXYCODONE AND ACETAMINOPHEN 2 TABLET(S): 5; 325 TABLET ORAL at 01:07

## 2020-01-11 RX ADMIN — OXYCODONE AND ACETAMINOPHEN 2 TABLET(S): 5; 325 TABLET ORAL at 16:15

## 2020-01-11 RX ADMIN — Medication 81 MILLIGRAM(S): at 14:32

## 2020-01-11 RX ADMIN — BUDESONIDE AND FORMOTEROL FUMARATE DIHYDRATE 2 PUFF(S): 160; 4.5 AEROSOL RESPIRATORY (INHALATION) at 05:31

## 2020-01-11 RX ADMIN — Medication 5 MILLIGRAM(S): at 22:05

## 2020-01-11 RX ADMIN — Medication 2: at 18:17

## 2020-01-11 RX ADMIN — ATORVASTATIN CALCIUM 40 MILLIGRAM(S): 80 TABLET, FILM COATED ORAL at 22:05

## 2020-01-11 NOTE — PROGRESS NOTE ADULT - PROBLEM SELECTOR PLAN 2
on empiric antibiotics: his all cultures  are negative and his RVP is negative too  1/6: no pna: has emphysema  1/7: remains stable off antibiotics  1/10: no resp symtoms: off antibiotics  1/11 Resolved. ID note appreciated.

## 2020-01-11 NOTE — PROGRESS NOTE ADULT - SUBJECTIVE AND OBJECTIVE BOX
Patient is a 59y old  Male who presents with a chief complaint of 59M w/ n/v/d and hyperglycemia sent in from [facility] (10 Piter 2020 19:22)    Any change in ROS: cough (+), scanty amount of sputum (+)     MEDICATIONS  (STANDING):  albuterol/ipratropium for Nebulization 3 milliLiter(s) Nebulizer every 6 hours  aspirin enteric coated 81 milliGRAM(s) Oral daily  atorvastatin 40 milliGRAM(s) Oral at bedtime  budesonide 160 MICROgram(s)/formoterol 4.5 MICROgram(s) Inhaler 2 Puff(s) Inhalation two times a day  chlorhexidine 4% Liquid 1 Application(s) Topical <User Schedule>  dextrose 5%. 1000 milliLiter(s) (50 mL/Hr) IV Continuous <Continuous>  dextrose 50% Injectable 12.5 Gram(s) IV Push once  dextrose 50% Injectable 25 Gram(s) IV Push once  dextrose 50% Injectable 25 Gram(s) IV Push once  enoxaparin Injectable 40 milliGRAM(s) SubCutaneous daily  famotidine    Tablet 20 milliGRAM(s) Oral daily  furosemide    Tablet 20 milliGRAM(s) Oral daily  insulin glargine Injectable (LANTUS) 50 Unit(s) SubCutaneous at bedtime  insulin lispro (HumaLOG) corrective regimen sliding scale   SubCutaneous three times a day before meals  insulin lispro (HumaLOG) corrective regimen sliding scale   SubCutaneous at bedtime  insulin lispro Injectable (HumaLOG) 15 Unit(s) SubCutaneous three times a day with meals  lisinopril 2.5 milliGRAM(s) Oral daily  melatonin 5 milliGRAM(s) Oral at bedtime  nicotine - Inhaler 1 Each Inhalation daily  risperiDONE   Tablet 0.5 milliGRAM(s) Oral three times a day  sertraline 50 milliGRAM(s) Oral at bedtime    MEDICATIONS  (PRN):  acetaminophen   Tablet .. 650 milliGRAM(s) Oral every 6 hours PRN Mild Pain (1 - 3)  dextrose 40% Gel 15 Gram(s) Oral once PRN Blood Glucose LESS THAN 70 milliGRAM(s)/deciliter  glucagon  Injectable 1 milliGRAM(s) IntraMuscular once PRN Glucose LESS THAN 70 milligrams/deciliter  oxycodone    5 mG/acetaminophen 325 mG 2 Tablet(s) Oral every 6 hours PRN Moderate Pain (4 - 6)  simethicone 80 milliGRAM(s) Chew every 12 hours PRN Indigestion    Vital Signs Last 24 Hrs  T(C): 36.8 (11 Jan 2020 04:08), Max: 36.8 (11 Jan 2020 04:08)  T(F): 98.3 (11 Jan 2020 04:08), Max: 98.3 (11 Jan 2020 04:08)  HR: 76 (11 Jan 2020 05:33) (76 - 87)  BP: 118/77 (11 Jan 2020 04:08) (115/70 - 122/78)  BP(mean): --  RR: 20 (11 Jan 2020 04:08) (18 - 20)  SpO2: 97% (11 Jan 2020 05:33) (96% - 98%)    I&O's Summary    10 Piter 2020 07:01  -  11 Jan 2020 07:00  --------------------------------------------------------  IN: 0 mL / OUT: 1900 mL / NET: -1900 mL          Physical Exam:   GENERAL: The patient comfortable with no apparent distress.   HEENT: Head is normocephalic and atraumatic.   NECK: Supple with no elevated JVP. short neck  LUNGS: Clear to auscultation without wheeze and rhonchi. diminished Lt>Rt  HEART: S1 and S2 present without murmur.  ABDOMEN: Soft, nontender, and nondistended.   EXTREMITIES: No edema or calf tenderness.  NEUROLOGIC: Grossly intact.    Labs:      CAPILLARY BLOOD GLUCOSE      POCT Blood Glucose.: 230 mg/dL (10 Piter 2020 22:13)  POCT Blood Glucose.: 139 mg/dL (10 Piter 2020 19:45)  POCT Blood Glucose.: 217 mg/dL (10 Piter 2020 13:56)  POCT Blood Glucose.: 199 mg/dL (10 Piter 2020 09:12)        Studies  Chest X-RAY  < from: Xray Chest 1 View AP/PA (12.03.19 @ 00:41) >  EXAM:  XR CHEST AP OR PA 1V                            PROCEDURE DATE:  12/03/2019            INTERPRETATION:  CLINICAL INDICATION: Shortness of breath.    EXAM: Frontal view of the chest with comparison made to chest radiograph   on 5/7/2019    FINDINGS:   The heart size is normal. Prominent pulmonary artery, which can be seen   in pulmonary arterial hypertension.  The left hemidiaphragm is obscured which may be secondary to a small left   pleural effusion. No focal consolidation is seen.  Thebones are unremarkable.    IMPRESSION:   Questionable small left pleural effusion..Prominent pulmonary artery,   which can be seen in pulmonary arterial hypertension.        < end of copied text >    CT SCAN Chest   < from: CT Chest No Cont (01.04.20 @ 04:06) >  EXAM:  CT CHEST                            PROCEDURE DATE:  01/04/2020            INTERPRETATION:  CLINICAL INFORMATION: History of COPD presenting with severe sepsis and hypoxia.    COMPARISON: Chest CT dated 12/3/2019. CT abdomen pelvis dated 1/3/2020.    PROCEDURE:   CT of the Chest was performed without intravenous contrast.  Sagittal and coronal reformats were performed.      FINDINGS:    LUNGS AND AIRWAYS: Patent central airways. Centrilobular emphysema. Bibasilar subsegmental atelectasis. No focal consolidation or parenchymal abnormality.    PLEURA: No pleural effusion.    MEDIASTINUM AND [place]: No lymphadenopathy.    VESSELS: Atherosclerotic calcifications of the aorta and coronary arteries. Enlarged main pulmonary artery measuring 4.2 cm, compatible with pulmonary arterial hypertension.    HEART: Heart size is normal. No pericardial effusion.    CHEST WALL AND LOWER NECK: Bilateral gynecomastia.    VISUALIZED UPPER ABDOMEN: Stable 1.5 cm left adrenal nodule.    BONES: Healed bilateral rib fractures. Degenerative changes of the spine.    IMPRESSION:     Emphysema.    No lung mass or consolidation.        < end of copied text >    Venous Dopplers: LE: n/a   CT Abdomen   < from: CT Abdomen and Pelvis w/ IV Cont (01.03.20 @ 19:55) >    EXAM:  CT ABDOMEN AND PELVIS IC                            PROCEDURE DATE:  01/03/2020            INTERPRETATION:  CLINICAL INFORMATION: Abdominal distention, evaluate for colitis or abscess    COMPARISON: CT chest from 12/30/2019..    PROCEDURE:   CT of the Abdomen and Pelvis was performed with intravenous contrast.   Intravenous contrast: 90 ml Omnipaque 350. 10 ml discarded.  Oral contrast: None.  Sagittal and coronal reformats were performed.    FINDINGS:    LOWER CHEST: Bibasilar subsegmental atelectasis. Cardiomegaly.    LIVER: Subcentimeter hypodensities which are are too small to characterize.   BILE DUCTS: Normal caliber.  GALLBLADDER: Within normal limits.  SPLEEN: Hypoattenuating splenic lesion. Splenomegaly measuring 14.7 cm in craniocaudal dimension.  PANCREAS: Within normal limits.  ADRENALS: Indeterminate left adrenal nodule measuring 1.5 cm. Right adrenal gland within normal limits.  KIDNEYS/URETERS: No renal stones or hydronephrosis. Right renal cyst.    BLADDER: Distended.  REPRODUCTIVE ORGANS: Prostate is normal in size.    BOWEL: Focal hypodensities within the stomach likely related to ingested material. No bowel obstruction.   Colonic diverticulosis without evidence of acute diverticulitis.  Appendix is normal.  PERITONEUM: No ascites.  VESSELS: Atherosclerotic changes.  RETROPERITONEUM/LYMPH NODES: No lymphadenopathy.    ABDOMINAL WALL: Small right inguinal fat containing hernia. Tiny fat-containing ventral abdominal hernia.  BONES: Grade 1 retrolisthesis of L5 and S1. Multilevel degenerative changes. Chronic appearing fracture deformity of the left ninth rib and right ninth rib.    IMPRESSION:    No bowel obstruction or bowel wall thickening.    Distended urinary bladder to the level of the umbilicus.    Indeterminate left adrenal nodule measuring 1.5 cm.                  YUDELKA SHARMA M.D., RADIOLOGY RESIDENT  This document has been electronically signed.  GAMA BRICE M.D., ATTENDING RADIOLOGIST  This document has been electronically signed. Piter  3 2020  9:04PM        < end of copied text >    Others  < from: TTE Echo Complete w/Doppler (10.04.18 @ 11:54) >  EXAM:  ECHO TRANSTHORACIC COMP W DOPP      PROCEDURE DATE:  Oct  4 2018   .      INTERPRETATION:  REPORT:    TRANSTHORACIC ECHOCARDIOGRAM REPORT         Patient Name:   DEVIKA CARBALLO Patient Location: Inpatient  Medical Rec #:  [id number]      Accession #:      [id number]  Account #:                    Height:           68.0 in 172.7 cm  YOB: 1960     Weight:           200.0 lb 90.72 kg  Patient Age:    58 years      BSA:              2.04 m²  Patient Gender: M             BP:        102/61 mmHg       Date of Exam:        10/4/2018 11:54:09 AM  Sonographer:         Michelle Tolentino  Referring Physician: Ana Laura Dahl MD    Procedure:     2D Echo/Doppler/Color Doppler Complete.  Indications:   Unspecified combined systolic (congestive) and diastolic                 (congestive) heart failure - I50.40  Diagnosis:     Unspecified combined systolic (congestive) and diastolic                 (congestive) heart failure - I50.40  Study Details: Technically adequate study. Study quality was adversely   affected                 due to patient obesity, body habitus, COPD and lung   interference.         2D AND M-MODE MEASUREMENTS (normal ranges within parentheses):  Left                Normal    Aorta/Left          Normal  Ventricle:                    Atrium:  IVSd (2D):    1.08  (0.7-1.1) Aortic Root  3.20   (2.4-3.7)                cm              (Mmode):     cm  LVPWd (2D):   1.19  (0.7-1.1) Left Atrium  3.70   (1.9-4.0)                cm              (Mmode):cm  LVIDd (2D):   4.52  (3.4-5.7) LA Volume    12.5                cm              Index        ml/m²  LVIDs (2D):   3.10                cm  LV FS (2D):   31.4  (>25%)                %  Relative Wall 0.53  (<0.42)  Thickness    LV SYSTOLIC FUNCTION BY 2D PLANIMETRY (MOD):  EF-A4C View: 78.5 % EF-A2C View: 78.0 % EF-Biplane: 79 %    LV DIASTOLIC FUNCTION:  MV Peak E: 0.46 m/s E/e' Ratio: 8.40  MV Peak A: 0.70 m/s  E/A Ratio: 0.67    SPECTRAL DOPPLER ANALYSIS (where applicable):  LVOT Vmax:  LVOT VTI:  LVOT Diameter: 1.97 cm    Tricuspid Valve and PA/RV Systolic Pressure: TR Max Velocity: 3.65 m/s RA   Pressure: 3 mmHg RVSP/PASP: 56.3 mmHg       PHYSICIAN INTERPRETATION:  Left Ventricle: The left ventricular internal cavity size is normal.  Global LV systolic function was hyperdynamic. Left ventricular ejection   fraction, by visual estimation, is >75%. The interventricular septum is   flattened in systole and diastole, consistent with right ventricular   pressure and volume overload. Spectral Doppler shows impaired relaxation   pattern of left ventricular myocardial filling (Grade I diastolic   dysfunction).  Right Ventricle: The right ventricular size is severely enlarged. RV   systolic function is severely reduced.  Left Atrium: Normal left atrial size.  Right Atrium: Severely enlarged right atrium.  Pericardium: There is no evidence of pericardial effusion.  Mitral Valve: Structurally normal mitral valve, with normal leaflet   excursion. Trace mitral valve regurgitation is seen.  Tricuspid Valve: The tricuspid valve is normal. Moderate tricuspid   regurgitation is visualized. Estimated pulmonary artery systolic pressure   is 56.3 mmHg assuming a right atrial pressure of 3 mmHg, which is   consistent with moderate pulmonary hypertension.  Aortic Valve: The aortic valve is trileaflet. Sclerotic aortic valve with   normal opening. No evidence of aortic valve regurgitation is seen.  Pulmonic Valve: Structurally normal pulmonic valve, with normal leaflet   excursion. Trace pulmonic valve regurgitation.  Aorta: The aortic root and ascending aorta are structurally normal, with   no evidence of dilitation.  Pulmonary Artery: The pulmonary artery is mildly dilated.  Venous: The inferior vena cava was normal sized, with respiratory size   variation greater than 50%.  In comparison to the previous echocardiogram(s): There are no prior   studies on this patient for comparison purposes. Findings are suggestive   of acute pulmonary embolism.       Summary:   1. Normal left atrial size.   2. Hyperdynamic wall motion.   3. Left ventricular ejection fraction, by visual estimation, is >75%.   Grade I diastolic dysfunction.   4. Severely enlarged right atrium.   5. Severely enlarged right ventricle. Severely reduced RV systolic  function.   6. Moderate tricuspid regurgitation.   7. Estimated pulmonary artery systolic pressure is 56.3 mmHg -moderate   pulmonary hypertension.   8. There is no evidence of pericardial effusion.   9. Findings are suggestive of acute pulmonary embolism.    [id number] Brady Lemons MD, RPVI, Electronically signed on 12/7/2018 at   4:03:19 PM              *** Final ***                  BRADY LEMONS M.D., ATTENDING CARDIOLOGY  This document has been electronically signed. Oct  4 2018 11:54AM          < end of copied text >

## 2020-01-11 NOTE — PROGRESS NOTE ADULT - SUBJECTIVE AND OBJECTIVE BOX
Chief complaint  Patient is a 59y old  Male who presents with a chief complaint of 59M w/ n/v/d and hyperglycemia sent in from [facility] (11 Jan 2020 21:16)   Review of systems  Patient in bed, looks comfortable, no fever, no hypoglycemia.    Labs and Fingersticks  CAPILLARY BLOOD GLUCOSE      POCT Blood Glucose.: 208 mg/dL (11 Jan 2020 22:09)  POCT Blood Glucose.: 229 mg/dL (11 Jan 2020 17:46)  POCT Blood Glucose.: 216 mg/dL (11 Jan 2020 14:16)  POCT Blood Glucose.: 268 mg/dL (11 Jan 2020 08:59)                        Medications  MEDICATIONS  (STANDING):  albuterol/ipratropium for Nebulization 3 milliLiter(s) Nebulizer every 6 hours  aspirin enteric coated 81 milliGRAM(s) Oral daily  atorvastatin 40 milliGRAM(s) Oral at bedtime  budesonide 160 MICROgram(s)/formoterol 4.5 MICROgram(s) Inhaler 2 Puff(s) Inhalation two times a day  chlorhexidine 4% Liquid 1 Application(s) Topical <User Schedule>  dextrose 5%. 1000 milliLiter(s) (50 mL/Hr) IV Continuous <Continuous>  dextrose 50% Injectable 12.5 Gram(s) IV Push once  dextrose 50% Injectable 25 Gram(s) IV Push once  dextrose 50% Injectable 25 Gram(s) IV Push once  enoxaparin Injectable 40 milliGRAM(s) SubCutaneous daily  famotidine    Tablet 20 milliGRAM(s) Oral daily  furosemide    Tablet 20 milliGRAM(s) Oral daily  insulin glargine Injectable (LANTUS) 56 Unit(s) SubCutaneous at bedtime  insulin lispro (HumaLOG) corrective regimen sliding scale   SubCutaneous three times a day before meals  insulin lispro (HumaLOG) corrective regimen sliding scale   SubCutaneous at bedtime  insulin lispro Injectable (HumaLOG) 18 Unit(s) SubCutaneous three times a day with meals  lisinopril 2.5 milliGRAM(s) Oral daily  melatonin 5 milliGRAM(s) Oral at bedtime  nicotine - Inhaler 1 Each Inhalation daily  risperiDONE   Tablet 0.5 milliGRAM(s) Oral three times a day  sertraline 50 milliGRAM(s) Oral at bedtime      Physical Exam  General: Patient comfortable in bed  Vital Signs Last 12 Hrs  T(F): 97.7 (01-11-20 @ 21:00), Max: 97.8 (01-11-20 @ 13:51)  HR: 80 (01-11-20 @ 21:00) (72 - 80)  BP: 104/67 (01-11-20 @ 21:00) (104/67 - 148/87)  BP(mean): --  RR: 20 (01-11-20 @ 21:00) (20 - 20)  SpO2: 100% (01-11-20 @ 21:00) (96% - 100%)  Neck: No palpable thyroid nodules.  CVS: S1S2, No murmurs  Respiratory: No wheezing, no crepitations  GI: Abdomen soft, bowel sounds positive  Musculoskeletal:  edema lower extremities.   Skin: No skin rashes, no ecchymosis    Diagnostics

## 2020-01-11 NOTE — PROGRESS NOTE ADULT - ASSESSMENT
Assessment  DMT2: 59y Male with DM T2 with hyperglycemia, was on large dose insulin at home, now on basal bolus insulin, blood sugars still running high, no hypoglycemic episode. Patient is eating meals with good appetite, comfortable and alert.  CAD: on medications, no chest pain, stable, monitored.  HTN: Controlled,  on antihypertensive medications.  Overweight/Obesity: No strict exercise routines, not on any weight loss program, neither on low calorie diet.          Laury Romero MD  Cell: [phone]  Office: [phone]

## 2020-01-11 NOTE — PROGRESS NOTE ADULT - SUBJECTIVE AND OBJECTIVE BOX
Patient is a 59y old  Male who presents with a chief complaint of 59M w/ n/v/d and hyperglycemia sent in from [facility] (11 Jan 2020 08:09)    pt. seen and examined, no c/o  INTERVAL HPI/OVERNIGHT EVENTS:  T(C): 36.5 (01-11-20 @ 21:00), Max: 36.8 (01-11-20 @ 04:08)  HR: 80 (01-11-20 @ 21:00) (72 - 83)  BP: 104/67 (01-11-20 @ 21:00) (104/67 - 148/87)  RR: 20 (01-11-20 @ 21:00) (20 - 20)  SpO2: 100% (01-11-20 @ 21:00) (96% - 100%)  Wt(kg): --  I&O's Summary    10 Piter 2020 07:01  -  11 Jan 2020 07:00  --------------------------------------------------------  IN: 0 mL / OUT: 1900 mL / NET: -1900 mL    11 Jan 2020 07:01  -  11 Jan 2020 21:16  --------------------------------------------------------  IN: 0 mL / OUT: 1225 mL / NET: -1225 mL        PAST MEDICAL & SURGICAL HISTORY:  Oxygen dependent  Gastroesophageal reflux disease, esophagitis presence not specified  Smoker  Bipolar 1 disorder  Coronary artery disease involving native coronary artery of native heart without angina pectoris  Type 2 diabetes mellitus without complication, with long-term current use of insulin  Pulmonary HTN  HLD (hyperlipidemia)  Stented coronary artery  COPD (chronic obstructive pulmonary disease)  No significant past surgical history      SOCIAL HISTORY  Alcohol:  Tobacco:  Illicit substance use:    FAMILY HISTORY:    REVIEW OF SYSTEMS:  CONSTITUTIONAL: No fever, weight loss, or fatigue  EYES: No eye pain, visual disturbances, or discharge  ENMT:  No difficulty hearing, tinnitus, vertigo; No sinus or throat pain  NECK: No pain or stiffness  RESPIRATORY: No cough, wheezing, chills or hemoptysis; No shortness of breath  CARDIOVASCULAR: No chest pain, palpitations, dizziness, or leg swelling  GASTROINTESTINAL: No abdominal or epigastric pain. No nausea, vomiting, or hematemesis; No diarrhea or constipation. No melena or hematochezia.  GENITOURINARY: No dysuria, frequency, hematuria, or incontinence  NEUROLOGICAL: No headaches, memory loss, loss of strength, numbness, or tremors  SKIN: No itching, burning, rashes, or lesions   LYMPH NODES: No enlarged glands  ENDOCRINE: No heat or cold intolerance; No hair loss  MUSCULOSKELETAL: No joint pain or swelling; No muscle, back, or extremity pain  PSYCHIATRIC: No depression, anxiety, mood swings, or difficulty sleeping  HEME/LYMPH: No easy bruising, or bleeding gums  ALLERY AND IMMUNOLOGIC: No hives or eczema    RADIOLOGY & ADDITIONAL TESTS:    Imaging Personally Reviewed:  [ ] YES  [ ] NO    Consultant(s) Notes Reviewed:  [ ] YES  [ ] NO    PHYSICAL EXAM:  GENERAL: NAD, well-groomed, well-developed  HEAD:  Atraumatic, Normocephalic  EYES: EOMI, PERRLA, conjunctiva and sclera clear  ENMT: No tonsillar erythema, exudates, or enlargement; Moist mucous membranes, Good dentition, No lesions  NECK: Supple, No JVD, Normal thyroid  NERVOUS SYSTEM:  Alert & Oriented X3, Good concentration; Motor Strength 5/5 B/L upper and lower extremities; DTRs 2+ intact and symmetric  CHEST/LUNG: Clear to percussion bilaterally; No rales, rhonchi, wheezing, or rubs  HEART: Regular rate and rhythm; No murmurs, rubs, or gallops  ABDOMEN: Soft, Nontender, Nondistended; Bowel sounds present  EXTREMITIES:  2+ Peripheral Pulses, No clubbing, cyanosis, or edema  LYMPH: No lymphadenopathy noted  SKIN: No rashes or lesions    LABS:              CAPILLARY BLOOD GLUCOSE      POCT Blood Glucose.: 229 mg/dL (11 Jan 2020 17:46)  POCT Blood Glucose.: 216 mg/dL (11 Jan 2020 14:16)  POCT Blood Glucose.: 268 mg/dL (11 Jan 2020 08:59)  POCT Blood Glucose.: 230 mg/dL (10 Piter 2020 22:13)            MEDICATIONS  (STANDING):  albuterol/ipratropium for Nebulization 3 milliLiter(s) Nebulizer every 6 hours  aspirin enteric coated 81 milliGRAM(s) Oral daily  atorvastatin 40 milliGRAM(s) Oral at bedtime  budesonide 160 MICROgram(s)/formoterol 4.5 MICROgram(s) Inhaler 2 Puff(s) Inhalation two times a day  chlorhexidine 4% Liquid 1 Application(s) Topical <User Schedule>  dextrose 5%. 1000 milliLiter(s) (50 mL/Hr) IV Continuous <Continuous>  dextrose 50% Injectable 12.5 Gram(s) IV Push once  dextrose 50% Injectable 25 Gram(s) IV Push once  dextrose 50% Injectable 25 Gram(s) IV Push once  enoxaparin Injectable 40 milliGRAM(s) SubCutaneous daily  famotidine    Tablet 20 milliGRAM(s) Oral daily  furosemide    Tablet 20 milliGRAM(s) Oral daily  insulin glargine Injectable (LANTUS) 56 Unit(s) SubCutaneous at bedtime  insulin lispro (HumaLOG) corrective regimen sliding scale   SubCutaneous three times a day before meals  insulin lispro (HumaLOG) corrective regimen sliding scale   SubCutaneous at bedtime  insulin lispro Injectable (HumaLOG) 18 Unit(s) SubCutaneous three times a day with meals  lisinopril 2.5 milliGRAM(s) Oral daily  melatonin 5 milliGRAM(s) Oral at bedtime  nicotine - Inhaler 1 Each Inhalation daily  risperiDONE   Tablet 0.5 milliGRAM(s) Oral three times a day  sertraline 50 milliGRAM(s) Oral at bedtime    MEDICATIONS  (PRN):  acetaminophen   Tablet .. 650 milliGRAM(s) Oral every 6 hours PRN Mild Pain (1 - 3)  aluminum hydroxide/magnesium hydroxide/simethicone Suspension 30 milliLiter(s) Oral every 4 hours PRN Dyspepsia  dextrose 40% Gel 15 Gram(s) Oral once PRN Blood Glucose LESS THAN 70 milliGRAM(s)/deciliter  glucagon  Injectable 1 milliGRAM(s) IntraMuscular once PRN Glucose LESS THAN 70 milligrams/deciliter  oxycodone    5 mG/acetaminophen 325 mG 2 Tablet(s) Oral every 6 hours PRN Moderate Pain (4 - 6)      Care Discussed with Consultants/Other Providers [ ] YES  [ ] NO

## 2020-01-11 NOTE — PROGRESS NOTE ADULT - PROBLEM SELECTOR PLAN 1
DM with hyperglycemia: Cont insulin:per endo  1/6: better  1/8: much improved: fels OK:  1/10: resolved:  1/11 resolved. Endo note appreciated

## 2020-01-11 NOTE — PROGRESS NOTE ADULT - PROBLEM SELECTOR PLAN 1
Will increase Lantus to 56 units at bed time.  Will increase Humalog to 18 units before each meal in addition to Humalog correction scale coverage.  Patient counseled for compliance with consistent low carb diet, exercise as tolerated as out patient.

## 2020-01-12 LAB
ANION GAP SERPL CALC-SCNC: 13 MMOL/L — SIGNIFICANT CHANGE UP (ref 5–17)
BUN SERPL-MCNC: 17 MG/DL — SIGNIFICANT CHANGE UP (ref 7–23)
CALCIUM SERPL-MCNC: 8.7 MG/DL — SIGNIFICANT CHANGE UP (ref 8.4–10.5)
CHLORIDE SERPL-SCNC: 92 MMOL/L — LOW (ref 96–108)
CO2 SERPL-SCNC: 27 MMOL/L — SIGNIFICANT CHANGE UP (ref 22–31)
CREAT SERPL-MCNC: 0.61 MG/DL — SIGNIFICANT CHANGE UP (ref 0.5–1.3)
GLUCOSE BLDC GLUCOMTR-MCNC: 165 MG/DL — HIGH (ref 70–99)
GLUCOSE BLDC GLUCOMTR-MCNC: 181 MG/DL — HIGH (ref 70–99)
GLUCOSE BLDC GLUCOMTR-MCNC: 188 MG/DL — HIGH (ref 70–99)
GLUCOSE BLDC GLUCOMTR-MCNC: 253 MG/DL — HIGH (ref 70–99)
GLUCOSE SERPL-MCNC: 289 MG/DL — HIGH (ref 70–99)
HCT VFR BLD CALC: 36.7 % — LOW (ref 39–50)
HGB BLD-MCNC: 11.8 G/DL — LOW (ref 13–17)
MAGNESIUM SERPL-MCNC: 1.8 MG/DL — SIGNIFICANT CHANGE UP (ref 1.6–2.6)
MCHC RBC-ENTMCNC: 28.6 PG — SIGNIFICANT CHANGE UP (ref 27–34)
MCHC RBC-ENTMCNC: 32.2 GM/DL — SIGNIFICANT CHANGE UP (ref 32–36)
MCV RBC AUTO: 88.9 FL — SIGNIFICANT CHANGE UP (ref 80–100)
NRBC # BLD: 0 /100 WBCS — SIGNIFICANT CHANGE UP (ref 0–0)
PLATELET # BLD AUTO: 244 K/UL — SIGNIFICANT CHANGE UP (ref 150–400)
POTASSIUM SERPL-MCNC: 4.6 MMOL/L — SIGNIFICANT CHANGE UP (ref 3.5–5.3)
POTASSIUM SERPL-SCNC: 4.6 MMOL/L — SIGNIFICANT CHANGE UP (ref 3.5–5.3)
RBC # BLD: 4.13 M/UL — LOW (ref 4.2–5.8)
RBC # FLD: 12.9 % — SIGNIFICANT CHANGE UP (ref 10.3–14.5)
SODIUM SERPL-SCNC: 132 MMOL/L — LOW (ref 135–145)
WBC # BLD: 10.54 K/UL — HIGH (ref 3.8–10.5)
WBC # FLD AUTO: 10.54 K/UL — HIGH (ref 3.8–10.5)

## 2020-01-12 RX ORDER — PANTOPRAZOLE SODIUM 20 MG/1
40 TABLET, DELAYED RELEASE ORAL ONCE
Refills: 0 | Status: COMPLETED | OUTPATIENT
Start: 2020-01-12 | End: 2020-01-12

## 2020-01-12 RX ORDER — FAMOTIDINE 10 MG/ML
20 INJECTION INTRAVENOUS
Refills: 0 | Status: DISCONTINUED | OUTPATIENT
Start: 2020-01-12 | End: 2020-01-13

## 2020-01-12 RX ORDER — OXYCODONE AND ACETAMINOPHEN 5; 325 MG/1; MG/1
2 TABLET ORAL EVERY 6 HOURS
Refills: 0 | Status: DISCONTINUED | OUTPATIENT
Start: 2020-01-12 | End: 2020-01-19

## 2020-01-12 RX ADMIN — LISINOPRIL 2.5 MILLIGRAM(S): 2.5 TABLET ORAL at 06:05

## 2020-01-12 RX ADMIN — Medication 5 MILLIGRAM(S): at 21:11

## 2020-01-12 RX ADMIN — Medication 20 MILLIGRAM(S): at 06:05

## 2020-01-12 RX ADMIN — Medication 18 UNIT(S): at 18:49

## 2020-01-12 RX ADMIN — Medication 18 UNIT(S): at 14:09

## 2020-01-12 RX ADMIN — Medication 30 MILLILITER(S): at 12:56

## 2020-01-12 RX ADMIN — FAMOTIDINE 20 MILLIGRAM(S): 10 INJECTION INTRAVENOUS at 12:58

## 2020-01-12 RX ADMIN — OXYCODONE AND ACETAMINOPHEN 2 TABLET(S): 5; 325 TABLET ORAL at 06:08

## 2020-01-12 RX ADMIN — Medication 3 MILLILITER(S): at 11:33

## 2020-01-12 RX ADMIN — OXYCODONE AND ACETAMINOPHEN 2 TABLET(S): 5; 325 TABLET ORAL at 20:56

## 2020-01-12 RX ADMIN — Medication 3: at 09:28

## 2020-01-12 RX ADMIN — Medication 30 MILLILITER(S): at 19:15

## 2020-01-12 RX ADMIN — Medication 1: at 14:09

## 2020-01-12 RX ADMIN — FAMOTIDINE 20 MILLIGRAM(S): 10 INJECTION INTRAVENOUS at 17:38

## 2020-01-12 RX ADMIN — Medication 81 MILLIGRAM(S): at 12:58

## 2020-01-12 RX ADMIN — OXYCODONE AND ACETAMINOPHEN 2 TABLET(S): 5; 325 TABLET ORAL at 14:38

## 2020-01-12 RX ADMIN — INSULIN GLARGINE 56 UNIT(S): 100 INJECTION, SOLUTION SUBCUTANEOUS at 22:32

## 2020-01-12 RX ADMIN — RISPERIDONE 0.5 MILLIGRAM(S): 4 TABLET ORAL at 06:05

## 2020-01-12 RX ADMIN — SERTRALINE 50 MILLIGRAM(S): 25 TABLET, FILM COATED ORAL at 21:11

## 2020-01-12 RX ADMIN — OXYCODONE AND ACETAMINOPHEN 2 TABLET(S): 5; 325 TABLET ORAL at 20:26

## 2020-01-12 RX ADMIN — ATORVASTATIN CALCIUM 40 MILLIGRAM(S): 80 TABLET, FILM COATED ORAL at 21:11

## 2020-01-12 RX ADMIN — PANTOPRAZOLE SODIUM 40 MILLIGRAM(S): 20 TABLET, DELAYED RELEASE ORAL at 22:32

## 2020-01-12 RX ADMIN — Medication 18 UNIT(S): at 09:28

## 2020-01-12 RX ADMIN — RISPERIDONE 0.5 MILLIGRAM(S): 4 TABLET ORAL at 21:12

## 2020-01-12 RX ADMIN — Medication 1 EACH: at 12:59

## 2020-01-12 RX ADMIN — Medication 3 MILLILITER(S): at 00:49

## 2020-01-12 RX ADMIN — OXYCODONE AND ACETAMINOPHEN 2 TABLET(S): 5; 325 TABLET ORAL at 06:38

## 2020-01-12 RX ADMIN — RISPERIDONE 0.5 MILLIGRAM(S): 4 TABLET ORAL at 13:00

## 2020-01-12 RX ADMIN — OXYCODONE AND ACETAMINOPHEN 2 TABLET(S): 5; 325 TABLET ORAL at 14:08

## 2020-01-12 RX ADMIN — ENOXAPARIN SODIUM 40 MILLIGRAM(S): 100 INJECTION SUBCUTANEOUS at 12:58

## 2020-01-12 RX ADMIN — Medication 1: at 18:50

## 2020-01-12 NOTE — PROGRESS NOTE ADULT - PROBLEM SELECTOR PLAN 2
on empiric antibiotics: his all cultures  are negative and his RVP is negative too  1/6: no pna: has emphysema  1/7: remains stable off antibiotics  1/10: no resp symtoms: off antibiotics  1/11 Resolved. ID note appreciated.  1/12 resolved. management defer to ID

## 2020-01-12 NOTE — PROGRESS NOTE ADULT - PROBLEM SELECTOR PLAN 1
DM with hyperglycemia: Cont insulin:per endo  1/6: better  1/8: much improved: fels OK:  1/10: resolved:  1/11 resolved. Endo note appreciated  1/12 resolved. Management per Endo

## 2020-01-12 NOTE — PROGRESS NOTE ADULT - SUBJECTIVE AND OBJECTIVE BOX
Chief complaint  Patient is a 59y old  Male who presents with a chief complaint of 59M w/ n/v/d and hyperglycemia sent in from [facility] (12 Jan 2020 14:26)   Review of systems  Patient in bed, looks comfortable, no fever, no hypoglycemia.    Labs and Fingersticks  CAPILLARY BLOOD GLUCOSE      POCT Blood Glucose.: 181 mg/dL (12 Jan 2020 18:47)  POCT Blood Glucose.: 188 mg/dL (12 Jan 2020 14:01)  POCT Blood Glucose.: 253 mg/dL (12 Jan 2020 08:58)  POCT Blood Glucose.: 208 mg/dL (11 Jan 2020 22:09)      Anion Gap, Serum: 13 (01-12 @ 06:21)      Calcium, Total Serum: 8.7 (01-12 @ 06:21)          01-12    132<L>  |  92<L>  |  17  ----------------------------<  289<H>  4.6   |  27  |  0.61    Ca    8.7      12 Jan 2020 06:21  Mg     1.8     01-12                          11.8   10.54 )-----------( 244      ( 12 Jan 2020 06:21 )             36.7     Medications  MEDICATIONS  (STANDING):  albuterol/ipratropium for Nebulization 3 milliLiter(s) Nebulizer every 6 hours  aspirin enteric coated 81 milliGRAM(s) Oral daily  atorvastatin 40 milliGRAM(s) Oral at bedtime  budesonide 160 MICROgram(s)/formoterol 4.5 MICROgram(s) Inhaler 2 Puff(s) Inhalation two times a day  chlorhexidine 4% Liquid 1 Application(s) Topical <User Schedule>  dextrose 5%. 1000 milliLiter(s) (50 mL/Hr) IV Continuous <Continuous>  dextrose 50% Injectable 12.5 Gram(s) IV Push once  dextrose 50% Injectable 25 Gram(s) IV Push once  dextrose 50% Injectable 25 Gram(s) IV Push once  enoxaparin Injectable 40 milliGRAM(s) SubCutaneous daily  famotidine    Tablet 20 milliGRAM(s) Oral two times a day  furosemide    Tablet 20 milliGRAM(s) Oral daily  insulin glargine Injectable (LANTUS) 56 Unit(s) SubCutaneous at bedtime  insulin lispro (HumaLOG) corrective regimen sliding scale   SubCutaneous three times a day before meals  insulin lispro (HumaLOG) corrective regimen sliding scale   SubCutaneous at bedtime  insulin lispro Injectable (HumaLOG) 18 Unit(s) SubCutaneous three times a day with meals  lisinopril 2.5 milliGRAM(s) Oral daily  melatonin 5 milliGRAM(s) Oral at bedtime  nicotine - Inhaler 1 Each Inhalation daily  risperiDONE   Tablet 0.5 milliGRAM(s) Oral three times a day  sertraline 50 milliGRAM(s) Oral at bedtime      Physical Exam  General: Patient comfortable in bed  Vital Signs Last 12 Hrs  T(F): 98.2 (01-12-20 @ 18:41), Max: 98.2 (01-12-20 @ 18:41)  HR: 76 (01-12-20 @ 18:41) (76 - 93)  BP: 120/80 (01-12-20 @ 18:41) (107/71 - 120/80)  BP(mean): --  RR: 20 (01-12-20 @ 18:41) (20 - 20)  SpO2: 96% (01-12-20 @ 18:41) (95% - 96%)  Neck: No palpable thyroid nodules.  CVS: S1S2, No murmurs  Respiratory: No wheezing, no crepitations  GI: Abdomen soft, bowel sounds positive  Musculoskeletal:  edema lower extremities.   Skin: No skin rashes, no ecchymosis    Diagnostics

## 2020-01-12 NOTE — CONSULT NOTE ADULT - REASON FOR ADMISSION
59M w/ n/v/d and hyperglycemia sent in from [facility]

## 2020-01-12 NOTE — PROGRESS NOTE ADULT - SUBJECTIVE AND OBJECTIVE BOX
[facility]   MD JONAH SNOW MD RUORU WONG, PA    TEL:  OFFICE: [phone]  DR LUGO CELL: [phone]  PATRIZIA BRISCOE CELL: [phone]  DR. FINCH CELL: [phone]  DR. BARRIOS CELL: [phone]    FROM 5 PM - 7 AM PLEASE CALL ANSWERING SERVICE: [phone]    RENAL FOLLOW UP NOTE  --------------------------------------------------------------------------------  HPI:      Pt seen and examined at bedside.   Denies SOB, chest pain     PAST HISTORY  --------------------------------------------------------------------------------  No significant changes to PMH, PSH, FHx, SHx, unless otherwise noted    ALLERGIES & MEDICATIONS  --------------------------------------------------------------------------------  Allergies    No Known Allergies    Intolerances      Standing Inpatient Medications  albuterol/ipratropium for Nebulization 3 milliLiter(s) Nebulizer every 6 hours  aspirin enteric coated 81 milliGRAM(s) Oral daily  atorvastatin 40 milliGRAM(s) Oral at bedtime  budesonide 160 MICROgram(s)/formoterol 4.5 MICROgram(s) Inhaler 2 Puff(s) Inhalation two times a day  chlorhexidine 4% Liquid 1 Application(s) Topical <User Schedule>  dextrose 5%. 1000 milliLiter(s) IV Continuous <Continuous>  dextrose 50% Injectable 12.5 Gram(s) IV Push once  dextrose 50% Injectable 25 Gram(s) IV Push once  dextrose 50% Injectable 25 Gram(s) IV Push once  enoxaparin Injectable 40 milliGRAM(s) SubCutaneous daily  famotidine    Tablet 20 milliGRAM(s) Oral daily  furosemide    Tablet 20 milliGRAM(s) Oral daily  insulin glargine Injectable (LANTUS) 56 Unit(s) SubCutaneous at bedtime  insulin lispro (HumaLOG) corrective regimen sliding scale   SubCutaneous three times a day before meals  insulin lispro (HumaLOG) corrective regimen sliding scale   SubCutaneous at bedtime  insulin lispro Injectable (HumaLOG) 18 Unit(s) SubCutaneous three times a day with meals  lisinopril 2.5 milliGRAM(s) Oral daily  melatonin 5 milliGRAM(s) Oral at bedtime  nicotine - Inhaler 1 Each Inhalation daily  risperiDONE   Tablet 0.5 milliGRAM(s) Oral three times a day  sertraline 50 milliGRAM(s) Oral at bedtime    PRN Inpatient Medications  acetaminophen   Tablet .. 650 milliGRAM(s) Oral every 6 hours PRN  aluminum hydroxide/magnesium hydroxide/simethicone Suspension 30 milliLiter(s) Oral every 4 hours PRN  dextrose 40% Gel 15 Gram(s) Oral once PRN  glucagon  Injectable 1 milliGRAM(s) IntraMuscular once PRN  oxycodone    5 mG/acetaminophen 325 mG 2 Tablet(s) Oral every 6 hours PRN      REVIEW OF SYSTEMS  --------------------------------------------------------------------------------  General: no fever  CVS: no chest pain  RESP: no sob, no cough  ABD: no abdominal pain  : no dysuria,  MSK: no edema     VITALS/PHYSICAL EXAM  --------------------------------------------------------------------------------  T(C): 36.7 (01-12-20 @ 04:29), Max: 36.7 (01-12-20 @ 04:29)  HR: 89 (01-12-20 @ 04:29) (72 - 89)  BP: 119/77 (01-12-20 @ 04:29) (104/67 - 148/87)  RR: 20 (01-12-20 @ 04:29) (20 - 20)  SpO2: 92% (01-12-20 @ 04:29) (92% - 100%)  Wt(kg): --        01-11-20 @ 07:01  -  01-12-20 @ 07:00  --------------------------------------------------------  IN: 0 mL / OUT: 2225 mL / NET: -2225 mL    01-12-20 @ 07:01  -  01-12-20 @ 09:26  --------------------------------------------------------  IN: 0 mL / OUT: 700 mL / NET: -700 mL      Physical Exam:  	Gen: NAD  	HEENT: MMM  	Pulm: CTA B/L  	CV: S1S2  	Abd: Soft, +BS  	Ext: No LE edema B/L                      Neuro: Awake , non focal  	Skin: Warm and Dry   	 no [place]    LABS/STUDIES  --------------------------------------------------------------------------------              11.8   10.54 >-----------<  244      [01-12-20 @ 06:21]              36.7     132  |  92  |  17  ----------------------------<  289      [01-12-20 @ 06:21]  4.6   |  27  |  0.61        Ca     8.7     [01-12-20 @ 06:21]      Mg     1.8     [01-12-20 @ 06:21]            Creatinine Trend:  SCr 0.61 [01-12 @ 06:21]  SCr 0.67 [01-05 @ 06:48]  SCr 0.61 [01-04 @ 03:47]  SCr 0.70 [01-03 @ 17:56]  SCr 0.81 [01-03 @ 13:50]    Urinalysis - [01-03-20 @ 22:18]      Color Light Yellow / Appearance Clear / SG 1.035 / pH 6.0      Gluc 1000 mg/dL / Ketone Small  / Bili Negative / Urobili Negative       Blood Small / Protein Negative / Leuk Est Negative / Nitrite Negative      RBC 11 / WBC 0 / Hyaline 0 / Gran  / Sq Epi  / Non Sq Epi 0 / Bacteria Negative      HbA1c 10.4      [12-04-19 @ 08:53]

## 2020-01-12 NOTE — PROGRESS NOTE ADULT - SUBJECTIVE AND OBJECTIVE BOX
Patient is a 59y old  Male who presents with a chief complaint of 59M w/ n/v/d and hyperglycemia sent in from [facility] (11 Jan 2020 23:19)    Any change in ROS: alert, awake, c/o constipation otherwise negative ROS    MEDICATIONS  (STANDING):  albuterol/ipratropium for Nebulization 3 milliLiter(s) Nebulizer every 6 hours  aspirin enteric coated 81 milliGRAM(s) Oral daily  atorvastatin 40 milliGRAM(s) Oral at bedtime  budesonide 160 MICROgram(s)/formoterol 4.5 MICROgram(s) Inhaler 2 Puff(s) Inhalation two times a day  chlorhexidine 4% Liquid 1 Application(s) Topical <User Schedule>  dextrose 5%. 1000 milliLiter(s) (50 mL/Hr) IV Continuous <Continuous>  dextrose 50% Injectable 12.5 Gram(s) IV Push once  dextrose 50% Injectable 25 Gram(s) IV Push once  dextrose 50% Injectable 25 Gram(s) IV Push once  enoxaparin Injectable 40 milliGRAM(s) SubCutaneous daily  famotidine    Tablet 20 milliGRAM(s) Oral daily  furosemide    Tablet 20 milliGRAM(s) Oral daily  insulin glargine Injectable (LANTUS) 56 Unit(s) SubCutaneous at bedtime  insulin lispro (HumaLOG) corrective regimen sliding scale   SubCutaneous three times a day before meals  insulin lispro (HumaLOG) corrective regimen sliding scale   SubCutaneous at bedtime  insulin lispro Injectable (HumaLOG) 18 Unit(s) SubCutaneous three times a day with meals  lisinopril 2.5 milliGRAM(s) Oral daily  melatonin 5 milliGRAM(s) Oral at bedtime  nicotine - Inhaler 1 Each Inhalation daily  risperiDONE   Tablet 0.5 milliGRAM(s) Oral three times a day  sertraline 50 milliGRAM(s) Oral at bedtime    MEDICATIONS  (PRN):  acetaminophen   Tablet .. 650 milliGRAM(s) Oral every 6 hours PRN Mild Pain (1 - 3)  aluminum hydroxide/magnesium hydroxide/simethicone Suspension 30 milliLiter(s) Oral every 4 hours PRN Dyspepsia  dextrose 40% Gel 15 Gram(s) Oral once PRN Blood Glucose LESS THAN 70 milliGRAM(s)/deciliter  glucagon  Injectable 1 milliGRAM(s) IntraMuscular once PRN Glucose LESS THAN 70 milligrams/deciliter  oxycodone    5 mG/acetaminophen 325 mG 2 Tablet(s) Oral every 6 hours PRN Moderate Pain (4 - 6)    Vital Signs Last 24 Hrs  T(C): 36.7 (12 Jan 2020 04:29), Max: 36.7 (12 Jan 2020 04:29)  T(F): 98 (12 Jan 2020 04:29), Max: 98 (12 Jan 2020 04:29)  HR: 89 (12 Jan 2020 04:29) (72 - 89)  BP: 119/77 (12 Jan 2020 04:29) (104/67 - 148/87)  BP(mean): --  RR: 20 (12 Jan 2020 04:29) (20 - 20)  SpO2: 92% (12 Jan 2020 04:29) (92% - 100%)    I&O's Summary    11 Jan 2020 07:01  -  12 Jan 2020 07:00  --------------------------------------------------------  IN: 0 mL / OUT: 2225 mL / NET: -2225 mL          Physical Exam:   GENERAL: The patient comfortable with no apparent distress.   HEENT: Head is normocephalic and atraumatic.    NECK: Supple with no elevated JVP.  LUNGS: Clear to auscultation without wheeze and rhonchi. Diminished   HEART: S1 and S2 present without murmur. distant HT  ABDOMEN: Soft, nontender, and nondistended.   EXTREMITIES: No edema or calf tenderness.  NEUROLOGIC: Grossly intact.    Labs:                              11.8   10.54 )-----------( 244      ( 12 Jan 2020 06:21 )             36.7     01-12    132<L>  |  92<L>  |  17  ----------------------------<  289<H>  4.6   |  27  |  0.61    Ca    8.7      12 Jan 2020 06:21  Mg     1.8     01-12      CAPILLARY BLOOD GLUCOSE      POCT Blood Glucose.: 208 mg/dL (11 Jan 2020 22:09)  POCT Blood Glucose.: 229 mg/dL (11 Jan 2020 17:46)  POCT Blood Glucose.: 216 mg/dL (11 Jan 2020 14:16)  POCT Blood Glucose.: 268 mg/dL (11 Jan 2020 08:59)        Studies  Chest X-RAY  < from: Xray Chest 1 View- PORTABLE-Urgent (01.03.20 @ 13:39) >  EXAM:  XR CHEST PORTABLE URGENT 1V                            PROCEDURE DATE:  01/03/2020            INTERPRETATION:  AP chest x-ray at 1335 hours on 1/3/2020.    Clinical information: History of COPD. Shortness of breath.    Comparison: Chest x-ray dated 12/3/2019.    Findings: The central pulmonary arteries are prominent. No consolidation or pleural effusion is present.    Impression: Clear lungs.        < end of copied text >    CT SCAN Chest   < from: CT Chest No Cont (01.04.20 @ 04:06) >  EXAM:  CT CHEST                            PROCEDURE DATE:  01/04/2020            INTERPRETATION:  CLINICAL INFORMATION: History of COPD presenting with severe sepsis and hypoxia.    COMPARISON: Chest CT dated 12/3/2019. CT abdomen pelvis dated 1/3/2020.    PROCEDURE:   CT of the Chest was performed without intravenous contrast.  Sagittal and coronal reformats were performed.      FINDINGS:    LUNGS AND AIRWAYS: Patent central airways. Centrilobular emphysema. Bibasilar subsegmental atelectasis. No focal consolidation or parenchymal abnormality.    PLEURA: No pleural effusion.    MEDIASTINUM AND CH[place]: No lymphadenopathy.    VESSELS: Atherosclerotic calcifications of the aorta and coronary arteries. Enlarged main pulmonary artery measuring 4.2 cm, compatible with pulmonary arterial hypertension.    HEART: Heart size is normal. No pericardial effusion.    CHEST WALL AND LOWER NECK: Bilateral gynecomastia.    VISUALIZED UPPER ABDOMEN: Stable 1.5 cm left adrenal nodule.    BONES: Healed bilateral rib fractures. Degenerative changes of the spine.    IMPRESSION:     Emphysema.    No lung mass or consolidation.      < end of copied text >    Venous Dopplers: LE:   n/a   CT Abdomen  < from: CT Abdomen and Pelvis w/ IV Cont (01.03.20 @ 19:55) >  EXAM:  CT ABDOMEN AND PELVIS IC                            PROCEDURE DATE:  01/03/2020            INTERPRETATION:  CLINICAL INFORMATION: Abdominal distention, evaluate for colitis or abscess    COMPARISON: CT chest from 12/30/2019..    PROCEDURE:   CT of the Abdomen and Pelvis was performed with intravenous contrast.   Intravenous contrast: 90 ml Omnipaque 350. 10 ml discarded.  Oral contrast: None.  Sagittal and coronal reformats were performed.    FINDINGS:    LOWER CHEST: Bibasilar subsegmental atelectasis. Cardiomegaly.    LIVER: Subcentimeter hypodensities which are are too small to characterize.   BILE DUCTS: Normal caliber.  GALLBLADDER: Within normal limits.  SPLEEN: Hypoattenuating splenic lesion. Splenomegaly measuring 14.7 cm in craniocaudal dimension.  PANCREAS: Within normal limits.  ADRENALS: Indeterminate left adrenal nodule measuring 1.5 cm. Right adrenal gland within normal limits.  KIDNEYS/URETERS: No renal stones or hydronephrosis. Right renal cyst.    BLADDER: Distended.  REPRODUCTIVE ORGANS: Prostate is normal in size.    BOWEL: Focal hypodensities within the stomach likely related to ingested material. No bowel obstruction.   Colonic diverticulosis without evidence of acute diverticulitis.  Appendix is normal.  PERITONEUM: No ascites.  VESSELS: Atherosclerotic changes.  RETROPERITONEUM/LYMPH NODES: No lymphadenopathy.    ABDOMINAL WALL: Small right inguinal fat containing hernia. Tiny fat-containing ventral abdominal hernia.  BONES: Grade 1 retrolisthesis of L5 and S1. Multilevel degenerative changes. Chronic appearing fracture deformity of the left ninth rib and right ninth rib.    IMPRESSION:    No bowel obstruction or bowel wall thickening.    Distended urinary bladder to the level of the umbilicus.    Indeterminate left adrenal nodule measuring 1.5 cm.                  YUDELKA SHARMA M.D., RADIOLOGY RESIDENT  This document has been electronically signed.  GAMA BRICE M.D., ATTENDING RADIOLOGIST  This document has been electronically signed. [place]iter  3 2020  9:04PM              < end of copied text >    Others    < from: TTE Echo Complete w/Doppler (10.04.18 @ 11:54) >  EXAM:  ECHO TRANSTHORACIC COMP W DOPP      PROCEDURE DATE:  Oct  4 2018   .      INTERPRETATION:  REPORT:    TRANSTHORACIC ECHOCARDIOGRAM REPORT         Patient Name:   DEVIKA CARBALLO Patient Location: Inpatient  Medical Rec #:  [id number]      Accession #:      [id number]  Account #:                    Height:           68.0 in 172.7 cm  YOB: 1960     Weight:           200.0 lb 90.72 kg  Patient Age:    58 years      BSA:              2.04 m²  Patient Gender: M             BP:        102/61 mmHg       Date of Exam:        10/4/2018 11:54:09 AM  Sonographer:         Michelle Tolentino  Referring Physician: Ana Laura Dahl MD    Procedure:     2D Echo/Doppler/Color Doppler Complete.  Indications:   Unspecified combined systolic (congestive) and diastolic                 (congestive) heart failure - I50.40  Diagnosis:     Unspecified combined systolic (congestive) and diastolic                 (congestive) heart failure - I50.40  Study Details: Technically adequate study. Study quality was adversely   affected                 due to patient obesity, body habitus, COPD and lung   interference.         2D AND M-MODE MEASUREMENTS (normal ranges within parentheses):  Left                Normal    Aorta/Left          Normal  Ventricle:                    Atrium:  IVSd (2D):    1.08  (0.7-1.1) Aortic Root  3.20   (2.4-3.7)                cm              (Mmode):     cm  LVPWd (2D):   1.19  (0.7-1.1) Left Atrium  3.70   (1.9-4.0)                cm              (Mmode):cm  LVIDd (2D):   4.52  (3.4-5.7) LA Volume    12.5                cm              Index        ml/m²  LVIDs (2D):   3.10                cm  LV FS (2D):   31.4  (>25%)                %  Relative Wall 0.53  (<0.42)  Thickness    LV SYSTOLIC FUNCTION BY 2D PLANIMETRY (MOD):  EF-A4C View: 78.5 % EF-A2C View: 78.0 % EF-Biplane: 79 %    LV DIASTOLIC FUNCTION:  MV Peak E: 0.46 m/s E/e' Ratio: 8.40  MV Peak A: 0.70 m/s  E/A Ratio: 0.67    SPECTRAL DOPPLER ANALYSIS (where applicable):  LVOT Vmax:  LVOT VTI:  LVOT Diameter: 1.97 cm    Tricuspid Valve and PA/RV Systolic Pressure: TR Max Velocity: 3.65 m/s RA   Pressure: 3 mmHg RVSP/PASP: 56.3 mmHg       PHYSICIAN INTERPRETATION:  Left Ventricle: The left ventricular internal cavity size is normal.  Global LV systolic function was hyperdynamic. Left ventricular ejection   fraction, by visual estimation, is >75%. The interventricular septum is   flattened in systole and diastole, consistent with right ventricular   pressure and volume overload. Spectral Doppler shows impaired relaxation   pattern of left ventricular myocardial filling (Grade I diastolic   dysfunction).  Right Ventricle: The right ventricular size is severely enlarged. RV   systolic function is severely reduced.  Left Atrium: Normal left atrial size.  Right Atrium: Severely enlarged right atrium.  Pericardium: There is no evidence of pericardial effusion.  Mitral Valve: Structurally normal mitral valve, with normal leaflet   excursion. Trace mitral valve regurgitation is seen.  Tricuspid Valve: The tricuspid valve is normal. Moderate tricuspid   regurgitation is visualized. Estimated pulmonary artery systolic pressure   is 56.3 mmHg assuming a right atrial pressure of 3 mmHg, which is   consistent with moderate pulmonary hypertension.  Aortic Valve: The aortic valve is trileaflet. Sclerotic aortic valve with   normal opening. No evidence of aortic valve regurgitation is seen.  Pulmonic Valve: Structurally normal pulmonic valve, with normal leaflet   excursion. Trace pulmonic valve regurgitation.  Aorta: The aortic root and ascending aorta are structurally normal, with   no evidence of dilitation.  Pulmonary Artery: The pulmonary artery is mildly dilated.  Venous: The inferior vena cava was normal sized, with respiratory size   variation greater than 50%.  In comparison to the previous echocardiogram(s): There are no prior   studies on this patient for comparison purposes. Findings are suggestive   of acute pulmonary embolism.       Summary:   1. Normal left atrial size.   2. Hyperdynamic wall motion.   3. Left ventricular ejection fraction, by visual estimation, is >75%.   Grade I diastolic dysfunction.   4. Severely enlarged right atrium.   5. Severely enlarged right ventricle. Severely reduced RV systolic  function.   6. Moderate tricuspid regurgitation.   7. Estimated pulmonary artery systolic pressure is 56.3 mmHg -moderate   pulmonary hypertension.   8. There is no evidence of pericardial effusion.   9. Findings are suggestive of acute pulmonary embolism.    [id number] Brady Lemons MD, RPVI, Electronically signed on 12/7/2018 at   4:03:19 PM              *** Final ***                  BRADY LEMONS M.D., ATTENDING CARDIOLOGY  This document has been electronically signed. Oct  4 2018 11:54AM        < end of copied text >

## 2020-01-12 NOTE — PROGRESS NOTE ADULT - ASSESSMENT
59M w/ COPD on 2L NC, DM2 on insulin, CAD s/p stent, HFpEF, pulm HTN, HTN, charted hx of schizophrenia/bipolar disorder, panic disorder, and recent admit for COPD exacerbation d/c 12/12/19 presents from [facility] (SNF) for n/v/d and hyperglycemia. Admitted with diabetic ketoacidosis, found to have hyponatremia    A/P:    Hyponatremia:  Likely sec to hyperglycemia/dehydration  Optimize glucose control  Monitor Na level at present  Continue lasix at present in view of h/o CHF-Diastolic      HTN:  Controlled   Monitor at present

## 2020-01-12 NOTE — CONSULT NOTE ADULT - SUBJECTIVE AND OBJECTIVE BOX
CHIEF COMPLAINT:Patient is a 59y old  Male who presents with a chief complaint of 59M w/ n/v/d and hyperglycemia sent in from SNF (12 Jan 2020 19:48)      HISTORY OF PRESENT ILLNESS:HPI:  59M w/ COPD on 2L NC, DM2 on insulin, CAD s/p stent, HFpEF, pulm HTN, HTN, charted hx of schizophrenia/bipolar disorder, panic disorder, and recent admit for COPD exacerbation d/c 12/12/19 presents from [facility] ([facility]) for n/v/d and hyperglycemia. Patient reports that over last 3d he has been experiencing chills and sweating w/ nausea and NBNB vomit with loose non-bloody stool. He also says his sugar was elevated but does not provide additional hx other than on ROS- no fever, cp, palpitations, sob, cough, abdominal pain, rash, joint pain or swelling, wounds or trauma. History otherwise collected from charted.    In the ED, VS 98.1, 116/75, 130, 25 97% 4L NC -? placed on BiPAP and the eventually weaned to 3L NC  s/p lantus 60Ux1 (15:43), humalog 10U x1 SQ(14:40), insulin regular 6U IV(15:46), calcium gluconate 2g (15:45), acetaminophen 636cvw3, 3L LR, duoneb 3mL x3, zosyn (16:22) (04 Jan 2020 04:08)      PAST MEDICAL & SURGICAL HISTORY:  Oxygen dependent  Gastroesophageal reflux disease, esophagitis presence not specified  Smoker  Bipolar 1 disorder  Coronary artery disease involving native coronary artery of native heart without angina pectoris  Type 2 diabetes mellitus without complication, with long-term current use of insulin  Pulmonary HTN  HLD (hyperlipidemia)  Stented coronary artery  COPD (chronic obstructive pulmonary disease)  No significant past surgical history          MEDICATIONS:  aspirin enteric coated 81 milliGRAM(s) Oral daily  enoxaparin Injectable 40 milliGRAM(s) SubCutaneous daily  furosemide    Tablet 20 milliGRAM(s) Oral daily  lisinopril 2.5 milliGRAM(s) Oral daily      albuterol/ipratropium for Nebulization 3 milliLiter(s) Nebulizer every 6 hours  budesonide 160 MICROgram(s)/formoterol 4.5 MICROgram(s) Inhaler 2 Puff(s) Inhalation two times a day    acetaminophen   Tablet .. 650 milliGRAM(s) Oral every 6 hours PRN  melatonin 5 milliGRAM(s) Oral at bedtime  oxycodone    5 mG/acetaminophen 325 mG 2 Tablet(s) Oral every 6 hours PRN  risperiDONE   Tablet 0.5 milliGRAM(s) Oral three times a day  sertraline 50 milliGRAM(s) Oral at bedtime    aluminum hydroxide/magnesium hydroxide/simethicone Suspension 30 milliLiter(s) Oral every 4 hours PRN  famotidine    Tablet 20 milliGRAM(s) Oral two times a day    atorvastatin 40 milliGRAM(s) Oral at bedtime  dextrose 40% Gel 15 Gram(s) Oral once PRN  dextrose 50% Injectable 12.5 Gram(s) IV Push once  dextrose 50% Injectable 25 Gram(s) IV Push once  dextrose 50% Injectable 25 Gram(s) IV Push once  glucagon  Injectable 1 milliGRAM(s) IntraMuscular once PRN  insulin glargine Injectable (LANTUS) 56 Unit(s) SubCutaneous at bedtime  insulin lispro (HumaLOG) corrective regimen sliding scale   SubCutaneous three times a day before meals  insulin lispro (HumaLOG) corrective regimen sliding scale   SubCutaneous at bedtime  insulin lispro Injectable (HumaLOG) 18 Unit(s) SubCutaneous three times a day with meals    chlorhexidine 4% Liquid 1 Application(s) Topical <User Schedule>  dextrose 5%. 1000 milliLiter(s) IV Continuous <Continuous>      FAMILY HISTORY:  No family history of mental disorder  No family history of hypertension  No family history of chronic obstructive pulmonary disease  No family history of cardiovascular disease      Non-contributory    SOCIAL HISTORY:    former smoker    Allergies    No Known Allergies    Intolerances    	  All other ROS negative    PHYSICAL EXAM:  T(C): 36.8 (01-12-20 @ 18:41), Max: 36.8 (01-12-20 @ 18:41)  HR: 76 (01-12-20 @ 18:41) (74 - 93)  BP: 120/80 (01-12-20 @ 18:41) (107/71 - 120/80)  RR: 20 (01-12-20 @ 18:41) (20 - 20)  SpO2: 96% (01-12-20 @ 18:41) (92% - 97%)  Wt(kg): --  I&O's Summary    11 Jan 2020 07:01  -  12 Jan 2020 07:00  --------------------------------------------------------  IN: 0 mL / OUT: 2225 mL / NET: -2225 mL    12 Jan 2020 07:01  -  12 Jan 2020 23:44  --------------------------------------------------------  IN: 720 mL / OUT: 1000 mL / NET: -280 mL        Appearance: Normal	  HEENT:   Normal oral mucosa, EOMI	  Cardiovascular: Normal S1 S2, No JVD, No murmurs  Respiratory: Lungs clear to auscultation	  Psychiatry: Alert  Gastrointestinal:  Soft, Non-tender, + BS	  Skin: No rashes   Neurologic: Non-focal  Extremities:  No edema  Vascular: Peripheral pulses palpable    	    	  	  CARDIAC MARKERS:  Labs personally reviewed by me                                  11.8   10.54 )-----------( 244      ( 12 Jan 2020 06:21 )             36.7     01-12    132<L>  |  92<L>  |  17  ----------------------------<  289<H>  4.6   |  27  |  0.61    Ca    8.7      12 Jan 2020 06:21  Mg     1.8     01-12            EKG: Personally reviewed by me - sinus tach rbbb      Assessment and Plan:   · Assessment		  59M w/ COPD on 2L NC, DM2 on insulin, CAD s/p stent, HFpEF, pulm HTN, HTN, charted hx of schizophrenia/bipolar disorder, panic disorder, and recent admit for COPD exacerbation d/c 12/12/19 presents from [facility] ([facility]) for n/v/d and hyperglycemia w/ mild DKA (now appearing resolved) w/ severe sepsis with bandemia w/ source suspected from gastroenteritis vs bronchitis.    Problem/Plan - 1:  ·  Problem: Acute on chronic respiratory failure with hypoxia.  Plan: improved.     Problem/Plan - 3:  ·  Problem: Coronary artery disease involving native coronary artery of native heart without angina pectoris.  Plan: c/w asa  -d/c plavix   - lisinopril 2.5 mg daily.     Problem/Plan - 3:  ·  Problem: Chronic diastolic HF  Plan: Euvolemic      Advanced care planning was discussed with patient/family. Goals of care was discussed with patient/family.  Differential diagnosis and plan of care discussed with patient after the evaluation.  Counseling on diet, exercise and medication compliance was done.        Shakir Barbour DO [facility]  Cardiovascular Medicine  [address] [place] [address], [address]  Office [phone]  Cell [phone]

## 2020-01-12 NOTE — PROGRESS NOTE ADULT - SUBJECTIVE AND OBJECTIVE BOX
Patient is a 59y old  Male who presents with a chief complaint of 59M w/ n/v/d and hyperglycemia sent in from [facility] (12 Jan 2020 09:26)    pt. seen and examined, denies any c/o    INTERVAL HPI/OVERNIGHT EVENTS:  T(C): 36.6 (01-12-20 @ 14:09), Max: 36.7 (01-12-20 @ 04:29)  HR: 82 (01-12-20 @ 14:09) (74 - 93)  BP: 107/71 (01-12-20 @ 14:09) (104/67 - 119/77)  RR: 20 (01-12-20 @ 14:09) (20 - 20)  SpO2: 96% (01-12-20 @ 14:09) (92% - 100%)  Wt(kg): --  I&O's Summary    11 Jan 2020 07:01  -  12 Jan 2020 07:00  --------------------------------------------------------  IN: 0 mL / OUT: 2225 mL / NET: -2225 mL    12 Jan 2020 07:01  -  12 Jan 2020 14:26  --------------------------------------------------------  IN: 0 mL / OUT: 1000 mL / NET: -1000 mL        PAST MEDICAL & SURGICAL HISTORY:  Oxygen dependent  Gastroesophageal reflux disease, esophagitis presence not specified  Smoker  Bipolar 1 disorder  Coronary artery disease involving native coronary artery of native heart without angina pectoris  Type 2 diabetes mellitus without complication, with long-term current use of insulin  Pulmonary HTN  HLD (hyperlipidemia)  Stented coronary artery  COPD (chronic obstructive pulmonary disease)  No significant past surgical history      SOCIAL HISTORY  Alcohol:  Tobacco:  Illicit substance use:    FAMILY HISTORY:    REVIEW OF SYSTEMS:  CONSTITUTIONAL: No fever, weight loss, or fatigue  EYES: No eye pain, visual disturbances, or discharge  ENMT:  No difficulty hearing, tinnitus, vertigo; No sinus or throat pain  NECK: No pain or stiffness  RESPIRATORY: No cough, wheezing, chills or hemoptysis; No shortness of breath  CARDIOVASCULAR: No chest pain, palpitations, dizziness, or leg swelling  GASTROINTESTINAL: No abdominal or epigastric pain. No nausea, vomiting, or hematemesis; No diarrhea or constipation. No melena or hematochezia.  GENITOURINARY: No dysuria, frequency, hematuria, or incontinence  NEUROLOGICAL: No headaches, memory loss, loss of strength, numbness, or tremors  SKIN: No itching, burning, rashes, or lesions   LYMPH NODES: No enlarged glands  ENDOCRINE: No heat or cold intolerance; No hair loss  MUSCULOSKELETAL: No joint pain or swelling; No muscle, back, or extremity pain  PSYCHIATRIC: No depression, anxiety, mood swings, or difficulty sleeping  HEME/LYMPH: No easy bruising, or bleeding gums  ALLERY AND IMMUNOLOGIC: No hives or eczema    RADIOLOGY & ADDITIONAL TESTS:    Imaging Personally Reviewed:  [ ] YES  [ ] NO    Consultant(s) Notes Reviewed:  [ ] YES  [ ] NO    PHYSICAL EXAM:  GENERAL: NAD, well-groomed, well-developed  HEAD:  Atraumatic, Normocephalic  EYES: EOMI, PERRLA, conjunctiva and sclera clear  ENMT: No tonsillar erythema, exudates, or enlargement; Moist mucous membranes, Good dentition, No lesions  NECK: Supple, No JVD, Normal thyroid  NERVOUS SYSTEM:  Alert & Oriented X3, Good concentration; Motor Strength 5/5 B/L upper and lower extremities; DTRs 2+ intact and symmetric  CHEST/LUNG: Clear to percussion bilaterally; No rales, rhonchi, wheezing, or rubs  HEART: Regular rate and rhythm; No murmurs, rubs, or gallops  ABDOMEN: Soft, Nontender, Nondistended; Bowel sounds present  EXTREMITIES:  2+ Peripheral Pulses, No clubbing, cyanosis, or edema  LYMPH: No lymphadenopathy noted  SKIN: No rashes or lesions    LABS:                        11.8   10.54 )-----------( 244      ( 12 Jan 2020 06:21 )             36.7     01-12    132<L>  |  92<L>  |  17  ----------------------------<  289<H>  4.6   |  27  |  0.61    Ca    8.7      12 Jan 2020 06:21  Mg     1.8     01-12          CAPILLARY BLOOD GLUCOSE      POCT Blood Glucose.: 188 mg/dL (12 Jan 2020 14:01)  POCT Blood Glucose.: 253 mg/dL (12 Jan 2020 08:58)  POCT Blood Glucose.: 208 mg/dL (11 Jan 2020 22:09)  POCT Blood Glucose.: 229 mg/dL (11 Jan 2020 17:46)            MEDICATIONS  (STANDING):  albuterol/ipratropium for Nebulization 3 milliLiter(s) Nebulizer every 6 hours  aspirin enteric coated 81 milliGRAM(s) Oral daily  atorvastatin 40 milliGRAM(s) Oral at bedtime  budesonide 160 MICROgram(s)/formoterol 4.5 MICROgram(s) Inhaler 2 Puff(s) Inhalation two times a day  chlorhexidine 4% Liquid 1 Application(s) Topical <User Schedule>  dextrose 5%. 1000 milliLiter(s) (50 mL/Hr) IV Continuous <Continuous>  dextrose 50% Injectable 12.5 Gram(s) IV Push once  dextrose 50% Injectable 25 Gram(s) IV Push once  dextrose 50% Injectable 25 Gram(s) IV Push once  enoxaparin Injectable 40 milliGRAM(s) SubCutaneous daily  famotidine    Tablet 20 milliGRAM(s) Oral daily  furosemide    Tablet 20 milliGRAM(s) Oral daily  insulin glargine Injectable (LANTUS) 56 Unit(s) SubCutaneous at bedtime  insulin lispro (HumaLOG) corrective regimen sliding scale   SubCutaneous three times a day before meals  insulin lispro (HumaLOG) corrective regimen sliding scale   SubCutaneous at bedtime  insulin lispro Injectable (HumaLOG) 18 Unit(s) SubCutaneous three times a day with meals  lisinopril 2.5 milliGRAM(s) Oral daily  melatonin 5 milliGRAM(s) Oral at bedtime  nicotine - Inhaler 1 Each Inhalation daily  risperiDONE   Tablet 0.5 milliGRAM(s) Oral three times a day  sertraline 50 milliGRAM(s) Oral at bedtime    MEDICATIONS  (PRN):  acetaminophen   Tablet .. 650 milliGRAM(s) Oral every 6 hours PRN Mild Pain (1 - 3)  aluminum hydroxide/magnesium hydroxide/simethicone Suspension 30 milliLiter(s) Oral every 4 hours PRN Dyspepsia  dextrose 40% Gel 15 Gram(s) Oral once PRN Blood Glucose LESS THAN 70 milliGRAM(s)/deciliter  glucagon  Injectable 1 milliGRAM(s) IntraMuscular once PRN Glucose LESS THAN 70 milligrams/deciliter  oxycodone    5 mG/acetaminophen 325 mG 2 Tablet(s) Oral every 6 hours PRN Moderate Pain (4 - 6)      Care Discussed with Consultants/Other Providers [ ] YES  [ ] NO

## 2020-01-12 NOTE — PROGRESS NOTE ADULT - ASSESSMENT
Assessment  DMT2: 59y Male with DM T2 with hyperglycemia, started on basal bolus insulin, dose was increased, blood sugars trending down, no hypoglycemic episode. Patient is eating meals with good appetite, comfortable and alert.  CAD: on medications, no chest pain, stable, monitored.  HTN: Controlled,  on antihypertensive medications.  Overweight/Obesity: No strict exercise routines, not on any weight loss program, neither on low calorie diet.          Laury Romero MD  Cell: [phone]  Office: [phone]

## 2020-01-13 LAB
GLUCOSE BLDC GLUCOMTR-MCNC: 143 MG/DL — HIGH (ref 70–99)
GLUCOSE BLDC GLUCOMTR-MCNC: 187 MG/DL — HIGH (ref 70–99)
GLUCOSE BLDC GLUCOMTR-MCNC: 224 MG/DL — HIGH (ref 70–99)
GLUCOSE BLDC GLUCOMTR-MCNC: 246 MG/DL — HIGH (ref 70–99)

## 2020-01-13 RX ORDER — PANTOPRAZOLE SODIUM 20 MG/1
40 TABLET, DELAYED RELEASE ORAL DAILY
Refills: 0 | Status: DISCONTINUED | OUTPATIENT
Start: 2020-01-13 | End: 2020-03-13

## 2020-01-13 RX ORDER — INSULIN GLARGINE 100 [IU]/ML
60 INJECTION, SOLUTION SUBCUTANEOUS AT BEDTIME
Refills: 0 | Status: DISCONTINUED | OUTPATIENT
Start: 2020-01-13 | End: 2020-01-15

## 2020-01-13 RX ORDER — INSULIN LISPRO 100/ML
20 VIAL (ML) SUBCUTANEOUS
Refills: 0 | Status: DISCONTINUED | OUTPATIENT
Start: 2020-01-13 | End: 2020-01-14

## 2020-01-13 RX ADMIN — Medication 3 MILLILITER(S): at 06:23

## 2020-01-13 RX ADMIN — Medication 1 EACH: at 14:11

## 2020-01-13 RX ADMIN — OXYCODONE AND ACETAMINOPHEN 2 TABLET(S): 5; 325 TABLET ORAL at 08:45

## 2020-01-13 RX ADMIN — Medication 81 MILLIGRAM(S): at 12:36

## 2020-01-13 RX ADMIN — Medication 20 UNIT(S): at 12:41

## 2020-01-13 RX ADMIN — OXYCODONE AND ACETAMINOPHEN 2 TABLET(S): 5; 325 TABLET ORAL at 17:00

## 2020-01-13 RX ADMIN — BUDESONIDE AND FORMOTEROL FUMARATE DIHYDRATE 2 PUFF(S): 160; 4.5 AEROSOL RESPIRATORY (INHALATION) at 07:36

## 2020-01-13 RX ADMIN — Medication 2: at 08:40

## 2020-01-13 RX ADMIN — INSULIN GLARGINE 60 UNIT(S): 100 INJECTION, SOLUTION SUBCUTANEOUS at 22:24

## 2020-01-13 RX ADMIN — Medication 3 MILLILITER(S): at 00:26

## 2020-01-13 RX ADMIN — BUDESONIDE AND FORMOTEROL FUMARATE DIHYDRATE 2 PUFF(S): 160; 4.5 AEROSOL RESPIRATORY (INHALATION) at 17:33

## 2020-01-13 RX ADMIN — Medication 3 MILLILITER(S): at 12:36

## 2020-01-13 RX ADMIN — RISPERIDONE 0.5 MILLIGRAM(S): 4 TABLET ORAL at 22:24

## 2020-01-13 RX ADMIN — OXYCODONE AND ACETAMINOPHEN 2 TABLET(S): 5; 325 TABLET ORAL at 16:07

## 2020-01-13 RX ADMIN — Medication 30 MILLILITER(S): at 16:11

## 2020-01-13 RX ADMIN — ENOXAPARIN SODIUM 40 MILLIGRAM(S): 100 INJECTION SUBCUTANEOUS at 12:36

## 2020-01-13 RX ADMIN — LISINOPRIL 2.5 MILLIGRAM(S): 2.5 TABLET ORAL at 06:23

## 2020-01-13 RX ADMIN — Medication 5 MILLIGRAM(S): at 22:24

## 2020-01-13 RX ADMIN — RISPERIDONE 0.5 MILLIGRAM(S): 4 TABLET ORAL at 14:11

## 2020-01-13 RX ADMIN — Medication 30 MILLILITER(S): at 08:53

## 2020-01-13 RX ADMIN — OXYCODONE AND ACETAMINOPHEN 2 TABLET(S): 5; 325 TABLET ORAL at 09:45

## 2020-01-13 RX ADMIN — Medication 3 MILLILITER(S): at 17:33

## 2020-01-13 RX ADMIN — RISPERIDONE 0.5 MILLIGRAM(S): 4 TABLET ORAL at 06:23

## 2020-01-13 RX ADMIN — SERTRALINE 50 MILLIGRAM(S): 25 TABLET, FILM COATED ORAL at 22:24

## 2020-01-13 RX ADMIN — ATORVASTATIN CALCIUM 40 MILLIGRAM(S): 80 TABLET, FILM COATED ORAL at 22:24

## 2020-01-13 RX ADMIN — Medication 20 MILLIGRAM(S): at 06:23

## 2020-01-13 RX ADMIN — Medication 20 UNIT(S): at 17:33

## 2020-01-13 RX ADMIN — FAMOTIDINE 20 MILLIGRAM(S): 10 INJECTION INTRAVENOUS at 06:23

## 2020-01-13 RX ADMIN — Medication 18 UNIT(S): at 08:39

## 2020-01-13 RX ADMIN — Medication 2: at 12:36

## 2020-01-13 NOTE — PROGRESS NOTE ADULT - PROBLEM SELECTOR PLAN 2
on empiric antibiotics: his all cultures  are negative and his RVP is negative too  1/6: no pna: has emphysema  1/7: remains stable off antibiotics  1/10: no resp symtoms: off antibiotics  1/11 Resolved. ID note appreciated.  1/12 resolved. management defer to ID  1/13: resolved

## 2020-01-13 NOTE — PROGRESS NOTE ADULT - SUBJECTIVE AND OBJECTIVE BOX
Patient is a 59y old  Male who presents with a chief complaint of 59M w/ n/v/d and hyperglycemia sent in from [facility] (13 Jan 2020 13:35)      Any change in ROS: SATBLE: NO sob    MEDICATIONS  (STANDING):  albuterol/ipratropium for Nebulization 3 milliLiter(s) Nebulizer every 6 hours  aspirin enteric coated 81 milliGRAM(s) Oral daily  atorvastatin 40 milliGRAM(s) Oral at bedtime  budesonide 160 MICROgram(s)/formoterol 4.5 MICROgram(s) Inhaler 2 Puff(s) Inhalation two times a day  chlorhexidine 4% Liquid 1 Application(s) Topical <User Schedule>  dextrose 5%. 1000 milliLiter(s) (50 mL/Hr) IV Continuous <Continuous>  dextrose 50% Injectable 12.5 Gram(s) IV Push once  dextrose 50% Injectable 25 Gram(s) IV Push once  dextrose 50% Injectable 25 Gram(s) IV Push once  enoxaparin Injectable 40 milliGRAM(s) SubCutaneous daily  furosemide    Tablet 20 milliGRAM(s) Oral daily  insulin glargine Injectable (LANTUS) 60 Unit(s) SubCutaneous at bedtime  insulin lispro (HumaLOG) corrective regimen sliding scale   SubCutaneous three times a day before meals  insulin lispro (HumaLOG) corrective regimen sliding scale   SubCutaneous at bedtime  insulin lispro Injectable (HumaLOG) 20 Unit(s) SubCutaneous three times a day with meals  lisinopril 2.5 milliGRAM(s) Oral daily  melatonin 5 milliGRAM(s) Oral at bedtime  nicotine - Inhaler 1 Each Inhalation daily  pantoprazole    Tablet 40 milliGRAM(s) Oral daily  risperiDONE   Tablet 0.5 milliGRAM(s) Oral three times a day  sertraline 50 milliGRAM(s) Oral at bedtime    MEDICATIONS  (PRN):  acetaminophen   Tablet .. 650 milliGRAM(s) Oral every 6 hours PRN Mild Pain (1 - 3)  aluminum hydroxide/magnesium hydroxide/simethicone Suspension 30 milliLiter(s) Oral every 4 hours PRN Dyspepsia  dextrose 40% Gel 15 Gram(s) Oral once PRN Blood Glucose LESS THAN 70 milliGRAM(s)/deciliter  glucagon  Injectable 1 milliGRAM(s) IntraMuscular once PRN Glucose LESS THAN 70 milligrams/deciliter  oxycodone    5 mG/acetaminophen 325 mG 2 Tablet(s) Oral every 6 hours PRN Moderate Pain (4 - 6)    Vital Signs Last 24 Hrs  T(C): 36.6 (13 Jan 2020 13:33), Max: 36.8 (12 Jan 2020 18:41)  T(F): 97.9 (13 Jan 2020 13:33), Max: 98.2 (12 Jan 2020 18:41)  HR: 77 (13 Jan 2020 13:33) (69 - 77)  BP: 109/72 (13 Jan 2020 13:33) (101/57 - 129/81)  BP(mean): --  RR: 20 (13 Jan 2020 13:33) (20 - 20)  SpO2: 94% (13 Jan 2020 13:33) (94% - 96%)    I&O's Summary    12 Jan 2020 07:01  -  13 Jan 2020 07:00  --------------------------------------------------------  IN: 720 mL / OUT: 1625 mL / NET: -905 mL    13 Jan 2020 07:01  -  13 Jan 2020 14:23  --------------------------------------------------------  IN: 540 mL / OUT: 1550 mL / NET: -1010 mL          Physical Exam:   GENERAL: NAD, well-groomed, well-developed  HEENT: GALO/   Atraumatic, Normocephalic  ENMT: No tonsillar erythema, exudates, or enlargement; Moist mucous membranes, Good dentition, No lesions  NECK: Supple, No JVD, Normal thyroid  CHEST/LUNG: Clear to auscultaion, ; No rales, rhonchi, wheezing, or rubs  CVS: Regular rate and rhythm; No murmurs, rubs, or gallops  GI: : Soft, Nontender, Nondistended; Bowel sounds present  NERVOUS SYSTEM:  Alert & Oriented X3  EXTREMITIES:  2+ Peripheral Pulses, No clubbing, cyanosis, or edema  LYMPH: No lymphadenopathy noted  SKIN: No rashes or lesions  ENDOCRINOLOGY: No Thyromegaly  PSYCH: Appropriate    Labs:                              11.8   10.54 )-----------( 244      ( 12 Jan 2020 06:21 )             36.7     01-12    132<L>  |  92<L>  |  17  ----------------------------<  289<H>  4.6   |  27  |  0.61    Ca    8.7      12 Jan 2020 06:21  Mg     1.8     01-12      CAPILLARY BLOOD GLUCOSE      POCT Blood Glucose.: 224 mg/dL (13 Jan 2020 12:27)  POCT Blood Glucose.: 246 mg/dL (13 Jan 2020 08:25)  POCT Blood Glucose.: 165 mg/dL (12 Jan 2020 21:50)  POCT Blood Glucose.: 181 mg/dL (12 Jan 2020 18:47)        < from: CT Chest No Cont (01.04.20 @ 04:06) >    INTERPRETATION:  CLINICAL INFORMATION: History of COPD presenting with severe sepsis and hypoxia.    COMPARISON: Chest CT dated 12/3/2019. CT abdomen pelvis dated 1/3/2020.    PROCEDURE:   CT of the Chest was performed without intravenous contrast.  Sagittal and coronal reformats were performed.      FINDINGS:    LUNGS AND AIRWAYS: Patent central airways. Centrilobular emphysema. Bibasilar subsegmental atelectasis. No focal consolidation or parenchymal abnormality.    PLEURA: No pleural effusion.    MEDIASTINUM AND [place]: No lymphadenopathy.    VESSELS: Atherosclerotic calcifications of the aorta and coronary arteries. Enlarged main pulmonary artery measuring 4.2 cm, compatible with pulmonary arterial hypertension.    HEART: Heart size is normal. No pericardial effusion.    CHEST WALL AND LOWER NECK: Bilateral gynecomastia.    VISUALIZED UPPER ABDOMEN: Stable 1.5 cm left adrenal nodule.    BONES: Healed bilateral rib fractures. Degenerative changes of the spine.    IMPRESSION:     Emphysema.    No lung mass or consolidation.                    LES GIBSON M.D., RADIOLOGY RESIDENT  This document has been electronically signed.  MAYDA GRIFFITH M.D., ATTENDING RADIOLOGIST  This document has been electronically signed. Jan 4 2020 10:01AM        < end of copied text >            RECENT CULTURES:        RESPIRATORY CULTURES:          Studies  Chest X-RAY  CT SCAN Chest   Venous Dopplers: LE:   CT Abdomen  Others

## 2020-01-13 NOTE — PROGRESS NOTE ADULT - SUBJECTIVE AND OBJECTIVE BOX
[facility]   MD JONAH SNOW MD RUORU WONG, PA    TEL:  OFFICE: [phone]  DR LUGO CELL: [phone]  PATRIZIA BRISCOE CELL: [phone]  DR. FINCH CELL: [phone]  DR. BARRIOS CELL: [phone]    FROM 5 PM - 7 AM PLEASE CALL ANSWERING SERVICE: [phone]    RENAL FOLLOW UP NOTE  --------------------------------------------------------------------------------  HPI:      Pt seen and examined at bedside.   Denies SOB, chest pain     PAST HISTORY  --------------------------------------------------------------------------------  No significant changes to PMH, PSH, FHx, SHx, unless otherwise noted    ALLERGIES & MEDICATIONS  --------------------------------------------------------------------------------  Allergies    No Known Allergies    Intolerances      Standing Inpatient Medications  albuterol/ipratropium for Nebulization 3 milliLiter(s) Nebulizer every 6 hours  aspirin enteric coated 81 milliGRAM(s) Oral daily  atorvastatin 40 milliGRAM(s) Oral at bedtime  budesonide 160 MICROgram(s)/formoterol 4.5 MICROgram(s) Inhaler 2 Puff(s) Inhalation two times a day  chlorhexidine 4% Liquid 1 Application(s) Topical <User Schedule>  dextrose 5%. 1000 milliLiter(s) IV Continuous <Continuous>  dextrose 50% Injectable 12.5 Gram(s) IV Push once  dextrose 50% Injectable 25 Gram(s) IV Push once  dextrose 50% Injectable 25 Gram(s) IV Push once  enoxaparin Injectable 40 milliGRAM(s) SubCutaneous daily  furosemide    Tablet 20 milliGRAM(s) Oral daily  insulin glargine Injectable (LANTUS) 60 Unit(s) SubCutaneous at bedtime  insulin lispro (HumaLOG) corrective regimen sliding scale   SubCutaneous three times a day before meals  insulin lispro (HumaLOG) corrective regimen sliding scale   SubCutaneous at bedtime  insulin lispro Injectable (HumaLOG) 20 Unit(s) SubCutaneous three times a day with meals  lisinopril 2.5 milliGRAM(s) Oral daily  melatonin 5 milliGRAM(s) Oral at bedtime  nicotine - Inhaler 1 Each Inhalation daily  pantoprazole    Tablet 40 milliGRAM(s) Oral daily  risperiDONE   Tablet 0.5 milliGRAM(s) Oral three times a day  sertraline 50 milliGRAM(s) Oral at bedtime    PRN Inpatient Medications  acetaminophen   Tablet .. 650 milliGRAM(s) Oral every 6 hours PRN  aluminum hydroxide/magnesium hydroxide/simethicone Suspension 30 milliLiter(s) Oral every 4 hours PRN  dextrose 40% Gel 15 Gram(s) Oral once PRN  glucagon  Injectable 1 milliGRAM(s) IntraMuscular once PRN  oxycodone    5 mG/acetaminophen 325 mG 2 Tablet(s) Oral every 6 hours PRN      REVIEW OF SYSTEMS  --------------------------------------------------------------------------------  General: no fever  CVS: no chest pain  RESP: no sob, no cough  ABD: no abdominal pain  : no dysuria,  MSK: no edema     VITALS/PHYSICAL EXAM  --------------------------------------------------------------------------------  T(C): 36.6 (01-13-20 @ 13:33), Max: 36.8 (01-12-20 @ 18:41)  HR: 77 (01-13-20 @ 13:33) (69 - 82)  BP: 109/72 (01-13-20 @ 13:33) (101/57 - 129/81)  RR: 20 (01-13-20 @ 13:33) (20 - 20)  SpO2: 94% (01-13-20 @ 13:33) (94% - 96%)  Wt(kg): --        01-12-20 @ 07:01  -  01-13-20 @ 07:00  --------------------------------------------------------  IN: 720 mL / OUT: 1625 mL / NET: -905 mL    01-13-20 @ 07:01  -  01-13-20 @ 13:36  --------------------------------------------------------  IN: 540 mL / OUT: 1550 mL / NET: -1010 mL      Physical Exam:  	Gen: NAD  	HEENT: MMM  	Pulm: CTA B/L  	CV: S1S2  	Abd: Soft, +BS  	Ext: No LE edema B/L                      Neuro: Awake , alert  	Skin: Warm and Dry   	 no [place]    LABS/STUDIES  --------------------------------------------------------------------------------              11.8   10.54 >-----------<  244      [01-12-20 @ 06:21]              36.7     132  |  92  |  17  ----------------------------<  289      [01-12-20 @ 06:21]  4.6   |  27  |  0.61        Ca     8.7     [01-12-20 @ 06:21]      Mg     1.8     [01-12-20 @ 06:21]            Creatinine Trend:  SCr 0.61 [01-12 @ 06:21]  SCr 0.67 [01-05 @ 06:48]  SCr 0.61 [01-04 @ 03:47]  SCr 0.70 [01-03 @ 17:56]  SCr 0.81 [01-03 @ 13:50]    Urinalysis - [01-03-20 @ 22:18]      Color Light Yellow / Appearance Clear / SG 1.035 / pH 6.0      Gluc 1000 mg/dL / Ketone Small  / Bili Negative / Urobili Negative       Blood Small / Protein Negative / Leuk Est Negative / Nitrite Negative      RBC 11 / WBC 0 / Hyaline 0 / Gran  / Sq Epi  / Non Sq Epi 0 / Bacteria Negative      HbA1c 10.4      [12-04-19 @ 08:53]

## 2020-01-13 NOTE — PROGRESS NOTE ADULT - PROBLEM SELECTOR PLAN 1
DM with hyperglycemia: Cont insulin:per endo  1/6: better  1/8: much improved: fels OK:  1/10: resolved:  1/11 resolved. Endo note appreciated  1/12 resolved. Management per Endo  1/13: per endo

## 2020-01-13 NOTE — PROGRESS NOTE ADULT - PROBLEM SELECTOR PLAN 1
Will increase Lantus to 60u at bedtime.  Will continue Humalog 18u before each meal as well as Humalog correction scale coverage. Will continue monitoring FS, log, will Follow up.  Patient counseled for compliance with consistent low carb diet, exercise as tolerated as out patient. Will increase Lantus to 60u at bedtime.  Will increase Humalog to 20u before each meal and continue Humalog correction scale coverage. Will continue monitoring FS, log, will Follow up.  Patient counseled for compliance with consistent low carb diet, exercise as tolerated as out patient. Patient counseled for compliance with consistent low carb diet, exercise as tolerated as out patient.

## 2020-01-13 NOTE — PROGRESS NOTE ADULT - SUBJECTIVE AND OBJECTIVE BOX
Patient is a 59y old  Male who presents with a chief complaint of 59M w/ n/v/d and hyperglycemia sent in from  (13 Jan 2020 14:23)    pt. seen and examined, c/o some abd discomfort , moved BM this am , no N/V  INTERVAL HPI/OVERNIGHT EVENTS:  T(C): 36.6 (01-13-20 @ 23:46), Max: 36.6 (01-13-20 @ 13:33)  HR: 72 (01-13-20 @ 23:46) (72 - 96)  BP: 96/58 (01-13-20 @ 23:46) (96/58 - 129/81)  RR: 18 (01-13-20 @ 23:46) (18 - 20)  SpO2: 94% (01-13-20 @ 23:46) (94% - 95%)  Wt(kg): --  I&O's Summary    12 Jan 2020 07:01  -  13 Jan 2020 07:00  --------------------------------------------------------  IN: 720 mL / OUT: 1625 mL / NET: -905 mL    13 Jan 2020 07:01  -  14 Jan 2020 00:46  --------------------------------------------------------  IN: 790 mL / OUT: 1550 mL / NET: -760 mL        PAST MEDICAL & SURGICAL HISTORY:  Oxygen dependent  Gastroesophageal reflux disease, esophagitis presence not specified  Smoker  Bipolar 1 disorder  Coronary artery disease involving native coronary artery of native heart without angina pectoris  Type 2 diabetes mellitus without complication, with long-term current use of insulin  Pulmonary HTN  HLD (hyperlipidemia)  Stented coronary artery  COPD (chronic obstructive pulmonary disease)  No significant past surgical history      SOCIAL HISTORY  Alcohol:  Tobacco:  Illicit substance use:    FAMILY HISTORY:    REVIEW OF SYSTEMS:  CONSTITUTIONAL: No fever, weight loss, or fatigue  EYES: No eye pain, visual disturbances, or discharge  ENMT:  No difficulty hearing, tinnitus, vertigo; No sinus or throat pain  NECK: No pain or stiffness  RESPIRATORY: No cough, wheezing, chills or hemoptysis; No shortness of breath  CARDIOVASCULAR: No chest pain, palpitations, dizziness, or leg swelling  GASTROINTESTINAL: No abdominal or epigastric pain. No nausea, vomiting, or hematemesis; No diarrhea or constipation. No melena or hematochezia.  GENITOURINARY: No dysuria, frequency, hematuria, or incontinence  NEUROLOGICAL: No headaches, memory loss, loss of strength, numbness, or tremors  SKIN: No itching, burning, rashes, or lesions   LYMPH NODES: No enlarged glands  ENDOCRINE: No heat or cold intolerance; No hair loss  MUSCULOSKELETAL: No joint pain or swelling; No muscle, back, or extremity pain  PSYCHIATRIC: No depression, anxiety, mood swings, or difficulty sleeping  HEME/LYMPH: No easy bruising, or bleeding gums  ALLERY AND IMMUNOLOGIC: No hives or eczema    RADIOLOGY & ADDITIONAL TESTS:    Imaging Personally Reviewed:  [ ] YES  [ ] NO    Consultant(s) Notes Reviewed:  [ ] YES  [ ] NO    PHYSICAL EXAM:  GENERAL: NAD, well-groomed, well-developed  HEAD:  Atraumatic, Normocephalic  EYES: EOMI, PERRLA, conjunctiva and sclera clear  ENMT: No tonsillar erythema, exudates, or enlargement; Moist mucous membranes, Good dentition, No lesions  NECK: Supple, No JVD, Normal thyroid  NERVOUS SYSTEM:  Alert & Oriented X3, Good concentration; Motor Strength 5/5 B/L upper and lower extremities; DTRs 2+ intact and symmetric  CHEST/LUNG: Clear to percussion bilaterally; No rales, rhonchi, wheezing, or rubs  HEART: Regular rate and rhythm; No murmurs, rubs, or gallops  ABDOMEN: Soft, Nontender, Nondistended; Bowel sounds present  EXTREMITIES:  2+ Peripheral Pulses, No clubbing, cyanosis, or edema  LYMPH: No lymphadenopathy noted  SKIN: No rashes or lesions    LABS:                        11.8   10.54 )-----------( 244      ( 12 Jan 2020 06:21 )             36.7     01-12    132<L>  |  92<L>  |  17  ----------------------------<  289<H>  4.6   |  27  |  0.61    Ca    8.7      12 Jan 2020 06:21  Mg     1.8     01-12          CAPILLARY BLOOD GLUCOSE      POCT Blood Glucose.: 187 mg/dL (13 Jan 2020 21:56)  POCT Blood Glucose.: 143 mg/dL (13 Jan 2020 17:11)  POCT Blood Glucose.: 224 mg/dL (13 Jan 2020 12:27)  POCT Blood Glucose.: 246 mg/dL (13 Jan 2020 08:25)            MEDICATIONS  (STANDING):  albuterol/ipratropium for Nebulization 3 milliLiter(s) Nebulizer every 6 hours  aspirin enteric coated 81 milliGRAM(s) Oral daily  atorvastatin 40 milliGRAM(s) Oral at bedtime  budesonide 160 MICROgram(s)/formoterol 4.5 MICROgram(s) Inhaler 2 Puff(s) Inhalation two times a day  chlorhexidine 4% Liquid 1 Application(s) Topical <User Schedule>  dextrose 5%. 1000 milliLiter(s) (50 mL/Hr) IV Continuous <Continuous>  dextrose 50% Injectable 12.5 Gram(s) IV Push once  dextrose 50% Injectable 25 Gram(s) IV Push once  dextrose 50% Injectable 25 Gram(s) IV Push once  enoxaparin Injectable 40 milliGRAM(s) SubCutaneous daily  furosemide    Tablet 20 milliGRAM(s) Oral daily  insulin glargine Injectable (LANTUS) 60 Unit(s) SubCutaneous at bedtime  insulin lispro (HumaLOG) corrective regimen sliding scale   SubCutaneous three times a day before meals  insulin lispro (HumaLOG) corrective regimen sliding scale   SubCutaneous at bedtime  insulin lispro Injectable (HumaLOG) 20 Unit(s) SubCutaneous three times a day with meals  lisinopril 2.5 milliGRAM(s) Oral daily  melatonin 5 milliGRAM(s) Oral at bedtime  nicotine - Inhaler 1 Each Inhalation daily  pantoprazole    Tablet 40 milliGRAM(s) Oral daily  risperiDONE   Tablet 0.5 milliGRAM(s) Oral three times a day  sertraline 50 milliGRAM(s) Oral at bedtime    MEDICATIONS  (PRN):  acetaminophen   Tablet .. 650 milliGRAM(s) Oral every 6 hours PRN Mild Pain (1 - 3)  aluminum hydroxide/magnesium hydroxide/simethicone Suspension 30 milliLiter(s) Oral every 4 hours PRN Dyspepsia  dextrose 40% Gel 15 Gram(s) Oral once PRN Blood Glucose LESS THAN 70 milliGRAM(s)/deciliter  glucagon  Injectable 1 milliGRAM(s) IntraMuscular once PRN Glucose LESS THAN 70 milligrams/deciliter  oxycodone    5 mG/acetaminophen 325 mG 2 Tablet(s) Oral every 6 hours PRN Moderate Pain (4 - 6)      Care Discussed with Consultants/Other Providers [ ] YES  [ ] NO

## 2020-01-13 NOTE — PROGRESS NOTE ADULT - SUBJECTIVE AND OBJECTIVE BOX
Chief complaint  Patient is a 59y old  Male who presents with a chief complaint of 59M w/ n/v/d and hyperglycemia sent in from [facility] (12 Jan 2020 19:48)   Review of systems  Patient in bed, looks comfortable, no fever,  no hypoglycemia.    Labs and Fingersticks  CAPILLARY BLOOD GLUCOSE      POCT Blood Glucose.: 246 mg/dL (13 Jan 2020 08:25)  POCT Blood Glucose.: 165 mg/dL (12 Jan 2020 21:50)  POCT Blood Glucose.: 181 mg/dL (12 Jan 2020 18:47)  POCT Blood Glucose.: 188 mg/dL (12 Jan 2020 14:01)      Anion Gap, Serum: 13 (01-12 @ 06:21)      Calcium, Total Serum: 8.7 (01-12 @ 06:21)          01-12    132<L>  |  92<L>  |  17  ----------------------------<  289<H>  4.6   |  27  |  0.61    Ca    8.7      12 Jan 2020 06:21  Mg     1.8     01-12                          11.8   10.54 )-----------( 244      ( 12 Jan 2020 06:21 )             36.7     Medications  MEDICATIONS  (STANDING):  albuterol/ipratropium for Nebulization 3 milliLiter(s) Nebulizer every 6 hours  aspirin enteric coated 81 milliGRAM(s) Oral daily  atorvastatin 40 milliGRAM(s) Oral at bedtime  budesonide 160 MICROgram(s)/formoterol 4.5 MICROgram(s) Inhaler 2 Puff(s) Inhalation two times a day  chlorhexidine 4% Liquid 1 Application(s) Topical <User Schedule>  dextrose 5%. 1000 milliLiter(s) (50 mL/Hr) IV Continuous <Continuous>  dextrose 50% Injectable 12.5 Gram(s) IV Push once  dextrose 50% Injectable 25 Gram(s) IV Push once  dextrose 50% Injectable 25 Gram(s) IV Push once  enoxaparin Injectable 40 milliGRAM(s) SubCutaneous daily  famotidine    Tablet 20 milliGRAM(s) Oral two times a day  furosemide    Tablet 20 milliGRAM(s) Oral daily  insulin glargine Injectable (LANTUS) 60 Unit(s) SubCutaneous at bedtime  insulin lispro (HumaLOG) corrective regimen sliding scale   SubCutaneous three times a day before meals  insulin lispro (HumaLOG) corrective regimen sliding scale   SubCutaneous at bedtime  insulin lispro Injectable (HumaLOG) 18 Unit(s) SubCutaneous three times a day with meals  lisinopril 2.5 milliGRAM(s) Oral daily  melatonin 5 milliGRAM(s) Oral at bedtime  nicotine - Inhaler 1 Each Inhalation daily  risperiDONE   Tablet 0.5 milliGRAM(s) Oral three times a day  sertraline 50 milliGRAM(s) Oral at bedtime      Physical Exam  General: Patient comfortable in bed  Vital Signs Last 12 Hrs  T(F): 97.5 (01-13-20 @ 06:22), Max: 97.9 (01-13-20 @ 00:26)  HR: 74 (01-13-20 @ 06:22) (69 - 74)  BP: 129/81 (01-13-20 @ 06:22) (101/57 - 129/81)  BP(mean): --  RR: 20 (01-13-20 @ 06:22) (20 - 20)  SpO2: 94% (01-13-20 @ 06:22) (94% - 95%)  Neck: No palpable thyroid nodules.  CVS: S1S2, No murmurs  Respiratory: No wheezing, no crepitations  GI: Abdomen soft, bowel sounds positive  Musculoskeletal:  edema lower extremities.   Skin: No skin rashes, no ecchymosis    Diagnostics Chief complaint  Patient is a 59y old  Male who presents with a chief complaint of 59M w/ n/v/d and hyperglycemia sent in from [facility] (12 Jan 2020 19:48)   Review of systems  Patient in bed, looks comfortable,  no fever,  no hypoglycemia.    Labs and Fingersticks  CAPILLARY BLOOD GLUCOSE      POCT Blood Glucose.: 246 mg/dL (13 Jan 2020 08:25)  POCT Blood Glucose.: 165 mg/dL (12 Jan 2020 21:50)  POCT Blood Glucose.: 181 mg/dL (12 Jan 2020 18:47)  POCT Blood Glucose.: 188 mg/dL (12 Jan 2020 14:01)      Anion Gap, Serum: 13 (01-12 @ 06:21)      Calcium, Total Serum: 8.7 (01-12 @ 06:21)          01-12    132<L>  |  92<L>  |  17  ----------------------------<  289<H>  4.6   |  27  |  0.61    Ca    8.7      12 Jan 2020 06:21  Mg     1.8     01-12                          11.8   10.54 )-----------( 244      ( 12 Jan 2020 06:21 )             36.7     Medications  MEDICATIONS  (STANDING):  albuterol/ipratropium for Nebulization 3 milliLiter(s) Nebulizer every 6 hours  aspirin enteric coated 81 milliGRAM(s) Oral daily  atorvastatin 40 milliGRAM(s) Oral at bedtime  budesonide 160 MICROgram(s)/formoterol 4.5 MICROgram(s) Inhaler 2 Puff(s) Inhalation two times a day  chlorhexidine 4% Liquid 1 Application(s) Topical <User Schedule>  dextrose 5%. 1000 milliLiter(s) (50 mL/Hr) IV Continuous <Continuous>  dextrose 50% Injectable 12.5 Gram(s) IV Push once  dextrose 50% Injectable 25 Gram(s) IV Push once  dextrose 50% Injectable 25 Gram(s) IV Push once  enoxaparin Injectable 40 milliGRAM(s) SubCutaneous daily  famotidine    Tablet 20 milliGRAM(s) Oral two times a day  furosemide    Tablet 20 milliGRAM(s) Oral daily  insulin glargine Injectable (LANTUS) 60 Unit(s) SubCutaneous at bedtime  insulin lispro (HumaLOG) corrective regimen sliding scale   SubCutaneous three times a day before meals  insulin lispro (HumaLOG) corrective regimen sliding scale   SubCutaneous at bedtime  insulin lispro Injectable (HumaLOG) 18 Unit(s) SubCutaneous three times a day with meals  lisinopril 2.5 milliGRAM(s) Oral daily  melatonin 5 milliGRAM(s) Oral at bedtime  nicotine - Inhaler 1 Each Inhalation daily  risperiDONE   Tablet 0.5 milliGRAM(s) Oral three times a day  sertraline 50 milliGRAM(s) Oral at bedtime      Physical Exam  General: Patient comfortable in bed  Vital Signs Last 12 Hrs  T(F): 97.5 (01-13-20 @ 06:22), Max: 97.9 (01-13-20 @ 00:26)  HR: 74 (01-13-20 @ 06:22) (69 - 74)  BP: 129/81 (01-13-20 @ 06:22) (101/57 - 129/81)  BP(mean): --  RR: 20 (01-13-20 @ 06:22) (20 - 20)  SpO2: 94% (01-13-20 @ 06:22) (94% - 95%)  Neck: No palpable thyroid nodules.  CVS: S1S2, No murmurs  Respiratory: No wheezing, no crepitations  GI: Abdomen soft, bowel sounds positive  Musculoskeletal:  edema lower extremities.   Skin: No skin rashes, no ecchymosis    Diagnostics

## 2020-01-13 NOTE — PROGRESS NOTE ADULT - ASSESSMENT
59M w/ COPD on 2L NC, DM2 on insulin, CAD s/p stent, HFpEF, pulm HTN, HTN, charted hx of schizophrenia/bipolar disorder, panic disorder, and recent admit for COPD exacerbation d/c 12/12/19 presents from [facility] (SNF) for n/v/d and hyperglycemia. Admitted with diabetic ketoacidosis, found to have hyponatremia    A/P:    Hyponatremia:  Likely sec to hyperglycemia/dehydration  Also on SSRI  Optimize glucose control  Monitor Na level at present  Continue lasix at present in view of h/o CHF-Diastolic      HTN:  Controlled   Monitor at present

## 2020-01-13 NOTE — PROGRESS NOTE ADULT - ASSESSMENT
Assessment  DMT2: 59y Male with DM T2 with hyperglycemia, now on basal bolus insulin, blood sugars fluctuating, running high in the AM, no hypoglycemic episode. Patient is eating meals with good appetite, comfortable and alert.  CAD: on medications, no chest pain, stable, monitored.  HTN: Controlled,  on antihypertensive medications.  Overweight/Obesity: No strict exercise routines, not on any weight loss program, neither on low calorie diet.          Laury Romero MD  Cell: [phone]  Office: [phone] Assessment  DMT2: 59y Male with DM T2 with hyperglycemia, now on basal bolus insulin, blood sugars running high and not at target (, 224), no hypoglycemic episodes. Patient is eating meals with good appetite, comfortable and alert, planning DC to rehab.  CAD: on medications, no chest pain, stable, monitored.  HTN: Controlled,  on antihypertensive medications.  Overweight/Obesity: No strict exercise routines, not on any weight loss program, neither on low calorie diet.          Laury Romero MD  Cell: [phone]  Office: [phone] Assessment  DMT2: 59y Male with DM T2 with hyperglycemia, now on basal bolus insulin, blood sugars running high and  not at target (, 224), no hypoglycemic episodes. Patient is eating meals with good appetite, comfortable and alert, planning DC to rehab.  CAD: on medications, no chest pain, stable, monitored.  HTN: Controlled,  on antihypertensive medications.  Overweight/Obesity: No strict exercise routines, not on any weight loss program, neither on low calorie diet.          Laury Romero MD  Cell: [phone]  Office: [phone]

## 2020-01-14 LAB
ANION GAP SERPL CALC-SCNC: 11 MMOL/L — SIGNIFICANT CHANGE UP (ref 5–17)
BUN SERPL-MCNC: 12 MG/DL — SIGNIFICANT CHANGE UP (ref 7–23)
CALCIUM SERPL-MCNC: 9 MG/DL — SIGNIFICANT CHANGE UP (ref 8.4–10.5)
CHLORIDE SERPL-SCNC: 93 MMOL/L — LOW (ref 96–108)
CO2 SERPL-SCNC: 27 MMOL/L — SIGNIFICANT CHANGE UP (ref 22–31)
CREAT SERPL-MCNC: 0.6 MG/DL — SIGNIFICANT CHANGE UP (ref 0.5–1.3)
GLUCOSE BLDC GLUCOMTR-MCNC: 148 MG/DL — HIGH (ref 70–99)
GLUCOSE BLDC GLUCOMTR-MCNC: 234 MG/DL — HIGH (ref 70–99)
GLUCOSE BLDC GLUCOMTR-MCNC: 259 MG/DL — HIGH (ref 70–99)
GLUCOSE BLDC GLUCOMTR-MCNC: 285 MG/DL — HIGH (ref 70–99)
GLUCOSE SERPL-MCNC: 245 MG/DL — HIGH (ref 70–99)
HCT VFR BLD CALC: 39 % — SIGNIFICANT CHANGE UP (ref 39–50)
HGB BLD-MCNC: 12 G/DL — LOW (ref 13–17)
MCHC RBC-ENTMCNC: 27.8 PG — SIGNIFICANT CHANGE UP (ref 27–34)
MCHC RBC-ENTMCNC: 30.8 GM/DL — LOW (ref 32–36)
MCV RBC AUTO: 90.5 FL — SIGNIFICANT CHANGE UP (ref 80–100)
PLATELET # BLD AUTO: 229 K/UL — SIGNIFICANT CHANGE UP (ref 150–400)
POTASSIUM SERPL-MCNC: 4.5 MMOL/L — SIGNIFICANT CHANGE UP (ref 3.5–5.3)
POTASSIUM SERPL-SCNC: 4.5 MMOL/L — SIGNIFICANT CHANGE UP (ref 3.5–5.3)
RBC # BLD: 4.31 M/UL — SIGNIFICANT CHANGE UP (ref 4.2–5.8)
RBC # FLD: 12.7 % — SIGNIFICANT CHANGE UP (ref 10.3–14.5)
SODIUM SERPL-SCNC: 131 MMOL/L — LOW (ref 135–145)
WBC # BLD: 9.25 K/UL — SIGNIFICANT CHANGE UP (ref 3.8–10.5)
WBC # FLD AUTO: 9.25 K/UL — SIGNIFICANT CHANGE UP (ref 3.8–10.5)

## 2020-01-14 RX ORDER — INSULIN LISPRO 100/ML
22 VIAL (ML) SUBCUTANEOUS
Refills: 0 | Status: DISCONTINUED | OUTPATIENT
Start: 2020-01-14 | End: 2020-01-15

## 2020-01-14 RX ADMIN — Medication 3 MILLILITER(S): at 00:56

## 2020-01-14 RX ADMIN — Medication 81 MILLIGRAM(S): at 12:14

## 2020-01-14 RX ADMIN — Medication 20 UNIT(S): at 12:25

## 2020-01-14 RX ADMIN — OXYCODONE AND ACETAMINOPHEN 2 TABLET(S): 5; 325 TABLET ORAL at 15:50

## 2020-01-14 RX ADMIN — RISPERIDONE 0.5 MILLIGRAM(S): 4 TABLET ORAL at 14:52

## 2020-01-14 RX ADMIN — INSULIN GLARGINE 60 UNIT(S): 100 INJECTION, SOLUTION SUBCUTANEOUS at 22:37

## 2020-01-14 RX ADMIN — Medication 3 MILLILITER(S): at 06:35

## 2020-01-14 RX ADMIN — Medication 20 MILLIGRAM(S): at 06:36

## 2020-01-14 RX ADMIN — PANTOPRAZOLE SODIUM 40 MILLIGRAM(S): 20 TABLET, DELAYED RELEASE ORAL at 12:15

## 2020-01-14 RX ADMIN — ENOXAPARIN SODIUM 40 MILLIGRAM(S): 100 INJECTION SUBCUTANEOUS at 12:15

## 2020-01-14 RX ADMIN — BUDESONIDE AND FORMOTEROL FUMARATE DIHYDRATE 2 PUFF(S): 160; 4.5 AEROSOL RESPIRATORY (INHALATION) at 17:24

## 2020-01-14 RX ADMIN — Medication 3 MILLILITER(S): at 17:24

## 2020-01-14 RX ADMIN — Medication 3: at 12:25

## 2020-01-14 RX ADMIN — OXYCODONE AND ACETAMINOPHEN 2 TABLET(S): 5; 325 TABLET ORAL at 08:48

## 2020-01-14 RX ADMIN — Medication 1 EACH: at 12:15

## 2020-01-14 RX ADMIN — OXYCODONE AND ACETAMINOPHEN 2 TABLET(S): 5; 325 TABLET ORAL at 21:32

## 2020-01-14 RX ADMIN — LISINOPRIL 2.5 MILLIGRAM(S): 2.5 TABLET ORAL at 06:36

## 2020-01-14 RX ADMIN — Medication 3 MILLILITER(S): at 12:15

## 2020-01-14 RX ADMIN — SERTRALINE 50 MILLIGRAM(S): 25 TABLET, FILM COATED ORAL at 21:24

## 2020-01-14 RX ADMIN — ATORVASTATIN CALCIUM 40 MILLIGRAM(S): 80 TABLET, FILM COATED ORAL at 21:24

## 2020-01-14 RX ADMIN — OXYCODONE AND ACETAMINOPHEN 2 TABLET(S): 5; 325 TABLET ORAL at 14:52

## 2020-01-14 RX ADMIN — Medication 20 UNIT(S): at 08:43

## 2020-01-14 RX ADMIN — RISPERIDONE 0.5 MILLIGRAM(S): 4 TABLET ORAL at 06:36

## 2020-01-14 RX ADMIN — RISPERIDONE 0.5 MILLIGRAM(S): 4 TABLET ORAL at 21:24

## 2020-01-14 RX ADMIN — Medication 1: at 22:37

## 2020-01-14 RX ADMIN — BUDESONIDE AND FORMOTEROL FUMARATE DIHYDRATE 2 PUFF(S): 160; 4.5 AEROSOL RESPIRATORY (INHALATION) at 06:35

## 2020-01-14 RX ADMIN — Medication 2: at 08:43

## 2020-01-14 RX ADMIN — OXYCODONE AND ACETAMINOPHEN 2 TABLET(S): 5; 325 TABLET ORAL at 09:48

## 2020-01-14 RX ADMIN — CHLORHEXIDINE GLUCONATE 1 APPLICATION(S): 213 SOLUTION TOPICAL at 12:15

## 2020-01-14 RX ADMIN — Medication 5 MILLIGRAM(S): at 21:24

## 2020-01-14 RX ADMIN — Medication 22 UNIT(S): at 17:24

## 2020-01-14 RX ADMIN — OXYCODONE AND ACETAMINOPHEN 2 TABLET(S): 5; 325 TABLET ORAL at 22:33

## 2020-01-14 NOTE — PROGRESS NOTE ADULT - SUBJECTIVE AND OBJECTIVE BOX
Patient is a 59y old  Male who presents with a chief complaint of 59M w/ n/v/d and hyperglycemia sent in from [facility] (14 Jan 2020 16:24)    pt. seen and examined, NAD    INTERVAL HPI/OVERNIGHT EVENTS:  T(C): 36.4 (01-14-20 @ 15:49), Max: 36.7 (01-14-20 @ 05:50)  HR: 85 (01-14-20 @ 15:49) (72 - 96)  BP: 115/68 (01-14-20 @ 15:49) (96/58 - 123/64)  RR: 18 (01-14-20 @ 15:49) (16 - 19)  SpO2: 94% (01-14-20 @ 15:49) (93% - 97%)  Wt(kg): --  I&O's Summary    13 Jan 2020 07:01  -  14 Jan 2020 07:00  --------------------------------------------------------  IN: 790 mL / OUT: 2050 mL / NET: -1260 mL    14 Jan 2020 07:01  -  14 Jan 2020 18:48  --------------------------------------------------------  IN: 600 mL / OUT: 2050 mL / NET: -1450 mL        PAST MEDICAL & SURGICAL HISTORY:  Oxygen dependent  Gastroesophageal reflux disease, esophagitis presence not specified  Smoker  Bipolar 1 disorder  Coronary artery disease involving native coronary artery of native heart without angina pectoris  Type 2 diabetes mellitus without complication, with long-term current use of insulin  Pulmonary HTN  HLD (hyperlipidemia)  Stented coronary artery  COPD (chronic obstructive pulmonary disease)  No significant past surgical history      SOCIAL HISTORY  Alcohol:  Tobacco:  Illicit substance use:    FAMILY HISTORY:    REVIEW OF SYSTEMS:  CONSTITUTIONAL: No fever, weight loss, or fatigue  EYES: No eye pain, visual disturbances, or discharge  ENMT:  No difficulty hearing, tinnitus, vertigo; No sinus or throat pain  NECK: No pain or stiffness  RESPIRATORY: No cough, wheezing, chills or hemoptysis; No shortness of breath  CARDIOVASCULAR: No chest pain, palpitations, dizziness, or leg swelling  GASTROINTESTINAL: No abdominal or epigastric pain. No nausea, vomiting, or hematemesis; No diarrhea or constipation. No melena or hematochezia.  GENITOURINARY: No dysuria, frequency, hematuria, or incontinence  NEUROLOGICAL: No headaches, memory loss, loss of strength, numbness, or tremors  SKIN: No itching, burning, rashes, or lesions   LYMPH NODES: No enlarged glands  ENDOCRINE: No heat or cold intolerance; No hair loss  MUSCULOSKELETAL: No joint pain or swelling; No muscle, back, or extremity pain  PSYCHIATRIC: No depression, anxiety, mood swings, or difficulty sleeping  HEME/LYMPH: No easy bruising, or bleeding gums  ALLERY AND IMMUNOLOGIC: No hives or eczema    RADIOLOGY & ADDITIONAL TESTS:    Imaging Personally Reviewed:  [ ] YES  [ ] NO    Consultant(s) Notes Reviewed:  [ ] YES  [ ] NO    PHYSICAL EXAM:  GENERAL: NAD, well-groomed, well-developed  HEAD:  Atraumatic, Normocephalic  EYES: EOMI, PERRLA, conjunctiva and sclera clear  ENMT: No tonsillar erythema, exudates, or enlargement; Moist mucous membranes, Good dentition, No lesions  NECK: Supple, No JVD, Normal thyroid  NERVOUS SYSTEM:  Alert & Oriented X3, Good concentration; Motor Strength 5/5 B/L upper and lower extremities; DTRs 2+ intact and symmetric  CHEST/LUNG: Clear to percussion bilaterally; No rales, rhonchi, wheezing, or rubs  HEART: Regular rate and rhythm; No murmurs, rubs, or gallops  ABDOMEN: Soft, Nontender, Nondistended; Bowel sounds present  EXTREMITIES:  2+ Peripheral Pulses, No clubbing, cyanosis, or edema  LYMPH: No lymphadenopathy noted  SKIN: No rashes or lesions    LABS:                        12.0   9.25  )-----------( 229      ( 14 Jan 2020 09:37 )             39.0     01-14    131<L>  |  93<L>  |  12  ----------------------------<  245<H>  4.5   |  27  |  0.60    Ca    9.0      14 Jan 2020 06:54          CAPILLARY BLOOD GLUCOSE      POCT Blood Glucose.: 148 mg/dL (14 Jan 2020 17:09)  POCT Blood Glucose.: 259 mg/dL (14 Jan 2020 12:13)  POCT Blood Glucose.: 234 mg/dL (14 Jan 2020 08:36)  POCT Blood Glucose.: 187 mg/dL (13 Jan 2020 21:56)            MEDICATIONS  (STANDING):  albuterol/ipratropium for Nebulization 3 milliLiter(s) Nebulizer every 6 hours  aspirin enteric coated 81 milliGRAM(s) Oral daily  atorvastatin 40 milliGRAM(s) Oral at bedtime  budesonide 160 MICROgram(s)/formoterol 4.5 MICROgram(s) Inhaler 2 Puff(s) Inhalation two times a day  chlorhexidine 4% Liquid 1 Application(s) Topical <User Schedule>  dextrose 5%. 1000 milliLiter(s) (50 mL/Hr) IV Continuous <Continuous>  dextrose 50% Injectable 12.5 Gram(s) IV Push once  dextrose 50% Injectable 25 Gram(s) IV Push once  dextrose 50% Injectable 25 Gram(s) IV Push once  enoxaparin Injectable 40 milliGRAM(s) SubCutaneous daily  furosemide    Tablet 20 milliGRAM(s) Oral daily  insulin glargine Injectable (LANTUS) 60 Unit(s) SubCutaneous at bedtime  insulin lispro (HumaLOG) corrective regimen sliding scale   SubCutaneous three times a day before meals  insulin lispro (HumaLOG) corrective regimen sliding scale   SubCutaneous at bedtime  insulin lispro Injectable (HumaLOG) 22 Unit(s) SubCutaneous three times a day with meals  lisinopril 2.5 milliGRAM(s) Oral daily  melatonin 5 milliGRAM(s) Oral at bedtime  nicotine - Inhaler 1 Each Inhalation daily  pantoprazole    Tablet 40 milliGRAM(s) Oral daily  risperiDONE   Tablet 0.5 milliGRAM(s) Oral three times a day  sertraline 50 milliGRAM(s) Oral at bedtime    MEDICATIONS  (PRN):  acetaminophen   Tablet .. 650 milliGRAM(s) Oral every 6 hours PRN Mild Pain (1 - 3)  aluminum hydroxide/magnesium hydroxide/simethicone Suspension 30 milliLiter(s) Oral every 4 hours PRN Dyspepsia  dextrose 40% Gel 15 Gram(s) Oral once PRN Blood Glucose LESS THAN 70 milliGRAM(s)/deciliter  glucagon  Injectable 1 milliGRAM(s) IntraMuscular once PRN Glucose LESS THAN 70 milligrams/deciliter  oxycodone    5 mG/acetaminophen 325 mG 2 Tablet(s) Oral every 6 hours PRN Moderate Pain (4 - 6)      Care Discussed with Consultants/Other Providers [ ] YES  [ ] NO

## 2020-01-14 NOTE — PROGRESS NOTE ADULT - ASSESSMENT
Assessment  DMT2: 59y Male with DM T2 with hyperglycemia, now on basal bolus insulin, increased dose yesterday, blood sugars still running high and not at target, no hypoglycemic episodes. Patient is eating meals with good appetite, comfortable and alert, planning DC to rehab.  CAD: on medications, no chest pain, stable, monitored.  HTN: Controlled,  on antihypertensive medications.  Overweight/Obesity: No strict exercise routines, not on any weight loss program, neither on low calorie diet.          Laury Romero MD  Cell: [phone]  Office: [phone] Assessment  DMT2: 59y Male with DM T2 with hyperglycemia, now on basal bolus insulin, increased dose yesterday, blood sugars still running high and not at target, no hypoglycemic episodes.  Patient is eating meals with good appetite, comfortable and alert, planning DC to rehab.  CAD: on medications, no chest pain, stable, monitored.  HTN: Controlled,  on antihypertensive medications.  Overweight/Obesity: No strict exercise routines, not on any weight loss program, neither on low calorie diet.          Laury Romero MD  Cell: [phone]  Office: [phone]

## 2020-01-14 NOTE — PROGRESS NOTE ADULT - SUBJECTIVE AND OBJECTIVE BOX
[facility]   MD JONAH SNOW MD RUORU WONG, PA    TEL:  OFFICE: [phone]  DR LUGO CELL: [phone]  PATRIZIA BRISCOE CELL: [phone]  DR. FINCH CELL: [phone]  DR. BARRIOS CELL: [phone]    FROM 5 PM - 7 AM PLEASE CALL ANSWERING SERVICE: [phone]    RENAL FOLLOW UP NOTE  --------------------------------------------------------------------------------  HPI:      Pt seen and examined at bedside.   Denies SOB, chest pain     PAST HISTORY  --------------------------------------------------------------------------------  No significant changes to PMH, PSH, FHx, SHx, unless otherwise noted    ALLERGIES & MEDICATIONS  --------------------------------------------------------------------------------  Allergies    No Known Allergies    Intolerances      Standing Inpatient Medications  albuterol/ipratropium for Nebulization 3 milliLiter(s) Nebulizer every 6 hours  aspirin enteric coated 81 milliGRAM(s) Oral daily  atorvastatin 40 milliGRAM(s) Oral at bedtime  budesonide 160 MICROgram(s)/formoterol 4.5 MICROgram(s) Inhaler 2 Puff(s) Inhalation two times a day  chlorhexidine 4% Liquid 1 Application(s) Topical <User Schedule>  dextrose 5%. 1000 milliLiter(s) IV Continuous <Continuous>  dextrose 50% Injectable 12.5 Gram(s) IV Push once  dextrose 50% Injectable 25 Gram(s) IV Push once  dextrose 50% Injectable 25 Gram(s) IV Push once  enoxaparin Injectable 40 milliGRAM(s) SubCutaneous daily  furosemide    Tablet 20 milliGRAM(s) Oral daily  insulin glargine Injectable (LANTUS) 60 Unit(s) SubCutaneous at bedtime  insulin lispro (HumaLOG) corrective regimen sliding scale   SubCutaneous three times a day before meals  insulin lispro (HumaLOG) corrective regimen sliding scale   SubCutaneous at bedtime  insulin lispro Injectable (HumaLOG) 20 Unit(s) SubCutaneous three times a day with meals  lisinopril 2.5 milliGRAM(s) Oral daily  melatonin 5 milliGRAM(s) Oral at bedtime  nicotine - Inhaler 1 Each Inhalation daily  pantoprazole    Tablet 40 milliGRAM(s) Oral daily  risperiDONE   Tablet 0.5 milliGRAM(s) Oral three times a day  sertraline 50 milliGRAM(s) Oral at bedtime    PRN Inpatient Medications  acetaminophen   Tablet .. 650 milliGRAM(s) Oral every 6 hours PRN  aluminum hydroxide/magnesium hydroxide/simethicone Suspension 30 milliLiter(s) Oral every 4 hours PRN  dextrose 40% Gel 15 Gram(s) Oral once PRN  glucagon  Injectable 1 milliGRAM(s) IntraMuscular once PRN  oxycodone    5 mG/acetaminophen 325 mG 2 Tablet(s) Oral every 6 hours PRN      REVIEW OF SYSTEMS  --------------------------------------------------------------------------------  General: no fever  CVS: no chest pain  RESP: no sob, no cough  ABD: no abdominal pain  : no dysuria,  MSK: no edema     VITALS/PHYSICAL EXAM  --------------------------------------------------------------------------------  T(C): 36.4 (01-14-20 @ 08:23), Max: 36.7 (01-14-20 @ 05:50)  HR: 94 (01-14-20 @ 08:23) (72 - 96)  BP: 102/63 (01-14-20 @ 08:23) (96/58 - 123/64)  RR: 17 (01-14-20 @ 08:23) (16 - 20)  SpO2: 93% (01-14-20 @ 08:23) (93% - 97%)  Wt(kg): --        01-13-20 @ 07:01  -  01-14-20 @ 07:00  --------------------------------------------------------  IN: 790 mL / OUT: 2050 mL / NET: -1260 mL    01-14-20 @ 07:01  -  01-14-20 @ 10:03  --------------------------------------------------------  IN: 0 mL / OUT: 600 mL / NET: -600 mL      Physical Exam:  	Gen: NAD  	HEENT: MMM  	Pulm: CTA B/L  	CV: S1S2  	Abd: Soft, +BS  	Ext: No LE edema B/L                      Neuro: Awake non focal  	Skin: Warm and Dry   	 no [place]  LABS/STUDIES  --------------------------------------------------------------------------------              12.0   9.25  >-----------<  229      [01-14-20 @ 09:37]              39.0     131  |  93  |  12  ----------------------------<  245      [01-14-20 @ 06:54]  4.5   |  27  |  0.60        Ca     9.0     [01-14-20 @ 06:54]            Creatinine Trend:  SCr 0.60 [01-14 @ 06:54]  SCr 0.61 [01-12 @ 06:21]  SCr 0.67 [01-05 @ 06:48]  SCr 0.61 [01-04 @ 03:47]  SCr 0.70 [01-03 @ 17:56]    Urinalysis - [01-03-20 @ 22:18]      Color Light Yellow / Appearance Clear / SG 1.035 / pH 6.0      Gluc 1000 mg/dL / Ketone Small  / Bili Negative / Urobili Negative       Blood Small / Protein Negative / Leuk Est Negative / Nitrite Negative      RBC 11 / WBC 0 / Hyaline 0 / Gran  / Sq Epi  / Non Sq Epi 0 / Bacteria Negative      HbA1c 10.4      [12-04-19 @ 08:53]

## 2020-01-14 NOTE — PROGRESS NOTE ADULT - SUBJECTIVE AND OBJECTIVE BOX
Chief complaint  Patient is a 59y old  Male who presents with a chief complaint of 59M w/ n/v/d and hyperglycemia sent in from [facility] (14 Jan 2020 10:03)   Review of systems  Patient in bed, looks comfortable, no fever, no hypoglycemia.    Labs and Fingersticks  CAPILLARY BLOOD GLUCOSE      POCT Blood Glucose.: 259 mg/dL (14 Jan 2020 12:13)  POCT Blood Glucose.: 234 mg/dL (14 Jan 2020 08:36)  POCT Blood Glucose.: 187 mg/dL (13 Jan 2020 21:56)  POCT Blood Glucose.: 143 mg/dL (13 Jan 2020 17:11)      Anion Gap, Serum: 11 (01-14 @ 06:54)      Calcium, Total Serum: 9.0 (01-14 @ 06:54)          01-14    131<L>  |  93<L>  |  12  ----------------------------<  245<H>  4.5   |  27  |  0.60    Ca    9.0      14 Jan 2020 06:54                          12.0   9.25  )-----------( 229      ( 14 Jan 2020 09:37 )             39.0     Medications  MEDICATIONS  (STANDING):  albuterol/ipratropium for Nebulization 3 milliLiter(s) Nebulizer every 6 hours  aspirin enteric coated 81 milliGRAM(s) Oral daily  atorvastatin 40 milliGRAM(s) Oral at bedtime  budesonide 160 MICROgram(s)/formoterol 4.5 MICROgram(s) Inhaler 2 Puff(s) Inhalation two times a day  chlorhexidine 4% Liquid 1 Application(s) Topical <User Schedule>  dextrose 5%. 1000 milliLiter(s) (50 mL/Hr) IV Continuous <Continuous>  dextrose 50% Injectable 12.5 Gram(s) IV Push once  dextrose 50% Injectable 25 Gram(s) IV Push once  dextrose 50% Injectable 25 Gram(s) IV Push once  enoxaparin Injectable 40 milliGRAM(s) SubCutaneous daily  furosemide    Tablet 20 milliGRAM(s) Oral daily  insulin glargine Injectable (LANTUS) 60 Unit(s) SubCutaneous at bedtime  insulin lispro (HumaLOG) corrective regimen sliding scale   SubCutaneous three times a day before meals  insulin lispro (HumaLOG) corrective regimen sliding scale   SubCutaneous at bedtime  insulin lispro Injectable (HumaLOG) 22 Unit(s) SubCutaneous three times a day with meals  lisinopril 2.5 milliGRAM(s) Oral daily  melatonin 5 milliGRAM(s) Oral at bedtime  nicotine - Inhaler 1 Each Inhalation daily  pantoprazole    Tablet 40 milliGRAM(s) Oral daily  risperiDONE   Tablet 0.5 milliGRAM(s) Oral three times a day  sertraline 50 milliGRAM(s) Oral at bedtime      Physical Exam  General: Patient comfortable in bed  Vital Signs Last 12 Hrs  T(F): 97.5 (01-14-20 @ 15:49), Max: 98 (01-14-20 @ 05:50)  HR: 85 (01-14-20 @ 15:49) (82 - 94)  BP: 115/68 (01-14-20 @ 15:49) (102/63 - 123/64)  BP(mean): --  RR: 18 (01-14-20 @ 15:49) (16 - 18)  SpO2: 94% (01-14-20 @ 15:49) (93% - 97%)  Neck: No palpable thyroid nodules.  CVS: S1S2, No murmurs  Respiratory: No wheezing, no crepitations  GI: Abdomen soft, bowel sounds positive  Musculoskeletal:  edema lower extremities.   Skin: No skin rashes, no ecchymosis    Diagnostics Chief complaint  Patient is a 59y old  Male who presents with a chief complaint of 59M w/ n/v/d and hyperglycemia sent in from [facility] (14 Jan 2020 10:03)   Review of systems  Patient in bed, looks comfortable, no fever,  no hypoglycemia.    Labs and Fingersticks  CAPILLARY BLOOD GLUCOSE      POCT Blood Glucose.: 259 mg/dL (14 Jan 2020 12:13)  POCT Blood Glucose.: 234 mg/dL (14 Jan 2020 08:36)  POCT Blood Glucose.: 187 mg/dL (13 Jan 2020 21:56)  POCT Blood Glucose.: 143 mg/dL (13 Jan 2020 17:11)      Anion Gap, Serum: 11 (01-14 @ 06:54)      Calcium, Total Serum: 9.0 (01-14 @ 06:54)          01-14    131<L>  |  93<L>  |  12  ----------------------------<  245<H>  4.5   |  27  |  0.60    Ca    9.0      14 Jan 2020 06:54                          12.0   9.25  )-----------( 229      ( 14 Jan 2020 09:37 )             39.0     Medications  MEDICATIONS  (STANDING):  albuterol/ipratropium for Nebulization 3 milliLiter(s) Nebulizer every 6 hours  aspirin enteric coated 81 milliGRAM(s) Oral daily  atorvastatin 40 milliGRAM(s) Oral at bedtime  budesonide 160 MICROgram(s)/formoterol 4.5 MICROgram(s) Inhaler 2 Puff(s) Inhalation two times a day  chlorhexidine 4% Liquid 1 Application(s) Topical <User Schedule>  dextrose 5%. 1000 milliLiter(s) (50 mL/Hr) IV Continuous <Continuous>  dextrose 50% Injectable 12.5 Gram(s) IV Push once  dextrose 50% Injectable 25 Gram(s) IV Push once  dextrose 50% Injectable 25 Gram(s) IV Push once  enoxaparin Injectable 40 milliGRAM(s) SubCutaneous daily  furosemide    Tablet 20 milliGRAM(s) Oral daily  insulin glargine Injectable (LANTUS) 60 Unit(s) SubCutaneous at bedtime  insulin lispro (HumaLOG) corrective regimen sliding scale   SubCutaneous three times a day before meals  insulin lispro (HumaLOG) corrective regimen sliding scale   SubCutaneous at bedtime  insulin lispro Injectable (HumaLOG) 22 Unit(s) SubCutaneous three times a day with meals  lisinopril 2.5 milliGRAM(s) Oral daily  melatonin 5 milliGRAM(s) Oral at bedtime  nicotine - Inhaler 1 Each Inhalation daily  pantoprazole    Tablet 40 milliGRAM(s) Oral daily  risperiDONE   Tablet 0.5 milliGRAM(s) Oral three times a day  sertraline 50 milliGRAM(s) Oral at bedtime      Physical Exam  General: Patient comfortable in bed  Vital Signs Last 12 Hrs  T(F): 97.5 (01-14-20 @ 15:49), Max: 98 (01-14-20 @ 05:50)  HR: 85 (01-14-20 @ 15:49) (82 - 94)  BP: 115/68 (01-14-20 @ 15:49) (102/63 - 123/64)  BP(mean): --  RR: 18 (01-14-20 @ 15:49) (16 - 18)  SpO2: 94% (01-14-20 @ 15:49) (93% - 97%)  Neck: No palpable thyroid nodules.  CVS: S1S2, No murmurs  Respiratory: No wheezing, no crepitations  GI: Abdomen soft, bowel sounds positive  Musculoskeletal:  edema lower extremities.   Skin: No skin rashes, no ecchymosis    Diagnostics

## 2020-01-14 NOTE — PROGRESS NOTE ADULT - PROBLEM SELECTOR PLAN 1
Will continue Lantus 60u at bedtime.  Will increase Humalog to 22u before each meal and continue coverage scale. Will continue monitoring FS and FU.  Patient counseled for compliance with consistent low carb diet, exercise as tolerated as out patient. Patient counseled for compliance with consistent low carb diet, exercise as tolerated as out patient.

## 2020-01-15 LAB
GLUCOSE BLDC GLUCOMTR-MCNC: 171 MG/DL — HIGH (ref 70–99)
GLUCOSE BLDC GLUCOMTR-MCNC: 223 MG/DL — HIGH (ref 70–99)
GLUCOSE BLDC GLUCOMTR-MCNC: 233 MG/DL — HIGH (ref 70–99)
GLUCOSE BLDC GLUCOMTR-MCNC: 254 MG/DL — HIGH (ref 70–99)

## 2020-01-15 RX ORDER — INSULIN LISPRO 100/ML
25 VIAL (ML) SUBCUTANEOUS
Refills: 0 | Status: DISCONTINUED | OUTPATIENT
Start: 2020-01-15 | End: 2020-01-19

## 2020-01-15 RX ORDER — INSULIN GLARGINE 100 [IU]/ML
66 INJECTION, SOLUTION SUBCUTANEOUS AT BEDTIME
Refills: 0 | Status: DISCONTINUED | OUTPATIENT
Start: 2020-01-15 | End: 2020-01-19

## 2020-01-15 RX ADMIN — Medication 22 UNIT(S): at 12:48

## 2020-01-15 RX ADMIN — LISINOPRIL 2.5 MILLIGRAM(S): 2.5 TABLET ORAL at 06:34

## 2020-01-15 RX ADMIN — RISPERIDONE 0.5 MILLIGRAM(S): 4 TABLET ORAL at 06:34

## 2020-01-15 RX ADMIN — CHLORHEXIDINE GLUCONATE 1 APPLICATION(S): 213 SOLUTION TOPICAL at 06:35

## 2020-01-15 RX ADMIN — Medication 81 MILLIGRAM(S): at 11:20

## 2020-01-15 RX ADMIN — PANTOPRAZOLE SODIUM 40 MILLIGRAM(S): 20 TABLET, DELAYED RELEASE ORAL at 11:29

## 2020-01-15 RX ADMIN — BUDESONIDE AND FORMOTEROL FUMARATE DIHYDRATE 2 PUFF(S): 160; 4.5 AEROSOL RESPIRATORY (INHALATION) at 17:44

## 2020-01-15 RX ADMIN — ATORVASTATIN CALCIUM 40 MILLIGRAM(S): 80 TABLET, FILM COATED ORAL at 22:20

## 2020-01-15 RX ADMIN — OXYCODONE AND ACETAMINOPHEN 2 TABLET(S): 5; 325 TABLET ORAL at 09:05

## 2020-01-15 RX ADMIN — Medication 3 MILLILITER(S): at 23:55

## 2020-01-15 RX ADMIN — BUDESONIDE AND FORMOTEROL FUMARATE DIHYDRATE 2 PUFF(S): 160; 4.5 AEROSOL RESPIRATORY (INHALATION) at 06:34

## 2020-01-15 RX ADMIN — INSULIN GLARGINE 66 UNIT(S): 100 INJECTION, SOLUTION SUBCUTANEOUS at 22:19

## 2020-01-15 RX ADMIN — Medication 22 UNIT(S): at 09:02

## 2020-01-15 RX ADMIN — RISPERIDONE 0.5 MILLIGRAM(S): 4 TABLET ORAL at 22:20

## 2020-01-15 RX ADMIN — Medication 3: at 12:48

## 2020-01-15 RX ADMIN — Medication 2: at 09:02

## 2020-01-15 RX ADMIN — Medication 3 MILLILITER(S): at 00:06

## 2020-01-15 RX ADMIN — Medication 1 EACH: at 11:17

## 2020-01-15 RX ADMIN — Medication 3 MILLILITER(S): at 17:44

## 2020-01-15 RX ADMIN — ENOXAPARIN SODIUM 40 MILLIGRAM(S): 100 INJECTION SUBCUTANEOUS at 11:20

## 2020-01-15 RX ADMIN — OXYCODONE AND ACETAMINOPHEN 2 TABLET(S): 5; 325 TABLET ORAL at 18:17

## 2020-01-15 RX ADMIN — Medication 5 MILLIGRAM(S): at 22:20

## 2020-01-15 RX ADMIN — Medication 3 MILLILITER(S): at 06:34

## 2020-01-15 RX ADMIN — OXYCODONE AND ACETAMINOPHEN 2 TABLET(S): 5; 325 TABLET ORAL at 09:35

## 2020-01-15 RX ADMIN — SERTRALINE 50 MILLIGRAM(S): 25 TABLET, FILM COATED ORAL at 22:20

## 2020-01-15 RX ADMIN — OXYCODONE AND ACETAMINOPHEN 2 TABLET(S): 5; 325 TABLET ORAL at 23:56

## 2020-01-15 RX ADMIN — Medication 20 MILLIGRAM(S): at 06:35

## 2020-01-15 RX ADMIN — Medication 3 MILLILITER(S): at 11:20

## 2020-01-15 RX ADMIN — RISPERIDONE 0.5 MILLIGRAM(S): 4 TABLET ORAL at 14:14

## 2020-01-15 RX ADMIN — Medication 1: at 17:45

## 2020-01-15 RX ADMIN — OXYCODONE AND ACETAMINOPHEN 2 TABLET(S): 5; 325 TABLET ORAL at 17:47

## 2020-01-15 RX ADMIN — Medication 25 UNIT(S): at 17:45

## 2020-01-15 NOTE — PROGRESS NOTE ADULT - SUBJECTIVE AND OBJECTIVE BOX
Patient is a 59y old  Male who presents with a chief complaint of 59M w/ n/v/d and hyperglycemia sent in from [facility] (15 Piter 2020 13:59)      INTERVAL HPI/OVERNIGHT EVENTS:  T(C): 36.3 (01-15-20 @ 19:19), Max: 36.8 (01-15-20 @ 14:17)  HR: 92 (01-15-20 @ 19:19) (72 - 92)  BP: 117/73 (01-15-20 @ 19:19) (109/72 - 117/73)  RR: 18 (01-15-20 @ 19:19) (18 - 18)  SpO2: 94% (01-15-20 @ 19:19) (94% - 95%)  Wt(kg): --  I&O's Summary    14 Jan 2020 07:01  -  15 Piter 2020 07:00  --------------------------------------------------------  IN: 950 mL / OUT: 2050 mL / NET: -1100 mL    15 Piter 2020 07:01  -  15 Piter 2020 23:58  --------------------------------------------------------  IN: 690 mL / OUT: 2700 mL / NET: -2010 mL        PAST MEDICAL & SURGICAL HISTORY:  Oxygen dependent  Gastroesophageal reflux disease, esophagitis presence not specified  Smoker  Bipolar 1 disorder  Coronary artery disease involving native coronary artery of native heart without angina pectoris  Type 2 diabetes mellitus without complication, with long-term current use of insulin  Pulmonary HTN  HLD (hyperlipidemia)  Stented coronary artery  COPD (chronic obstructive pulmonary disease)  No significant past surgical history      SOCIAL HISTORY  Alcohol:  Tobacco:  Illicit substance use:    FAMILY HISTORY:    REVIEW OF SYSTEMS:  CONSTITUTIONAL: No fever, weight loss, or fatigue  EYES: No eye pain, visual disturbances, or discharge  ENMT:  No difficulty hearing, tinnitus, vertigo; No sinus or throat pain  NECK: No pain or stiffness  RESPIRATORY: No cough, wheezing, chills or hemoptysis; No shortness of breath  CARDIOVASCULAR: No chest pain, palpitations, dizziness, or leg swelling  GASTROINTESTINAL: No abdominal or epigastric pain. No nausea, vomiting, or hematemesis; No diarrhea or constipation. No melena or hematochezia.  GENITOURINARY: No dysuria, frequency, hematuria, or incontinence  NEUROLOGICAL: No headaches, memory loss, loss of strength, numbness, or tremors  SKIN: No itching, burning, rashes, or lesions   LYMPH NODES: No enlarged glands  ENDOCRINE: No heat or cold intolerance; No hair loss  MUSCULOSKELETAL: No joint pain or swelling; No muscle, back, or extremity pain  PSYCHIATRIC: No depression, anxiety, mood swings, or difficulty sleeping  HEME/LYMPH: No easy bruising, or bleeding gums  ALLERY AND IMMUNOLOGIC: No hives or eczema    RADIOLOGY & ADDITIONAL TESTS:    Imaging Personally Reviewed:  [ ] YES  [ ] NO    Consultant(s) Notes Reviewed:  [ ] YES  [ ] NO    PHYSICAL EXAM:  GENERAL: NAD, well-groomed, well-developed  HEAD:  Atraumatic, Normocephalic  EYES: EOMI, PERRLA, conjunctiva and sclera clear  ENMT: No tonsillar erythema, exudates, or enlargement; Moist mucous membranes, Good dentition, No lesions  NECK: Supple, No JVD, Normal thyroid  NERVOUS SYSTEM:  Alert & Oriented X3, Good concentration; Motor Strength 5/5 B/L upper and lower extremities; DTRs 2+ intact and symmetric  CHEST/LUNG: Clear to percussion bilaterally; No rales, rhonchi, wheezing, or rubs  HEART: Regular rate and rhythm; No murmurs, rubs, or gallops  ABDOMEN: Soft, Nontender, Nondistended; Bowel sounds present  EXTREMITIES:  2+ Peripheral Pulses, No clubbing, cyanosis, or edema  LYMPH: No lymphadenopathy noted  SKIN: No rashes or lesions    LABS:                        12.0   9.25  )-----------( 229      ( 14 Jan 2020 09:37 )             39.0     01-14    131<L>  |  93<L>  |  12  ----------------------------<  245<H>  4.5   |  27  |  0.60    Ca    9.0      14 Jan 2020 06:54          CAPILLARY BLOOD GLUCOSE      POCT Blood Glucose.: 233 mg/dL (15 Piter 2020 22:03)  POCT Blood Glucose.: 171 mg/dL (15 Piter 2020 17:08)  POCT Blood Glucose.: 254 mg/dL (15 Piter 2020 12:10)  POCT Blood Glucose.: 223 mg/dL (15 Piter 2020 08:08)            MEDICATIONS  (STANDING):  albuterol/ipratropium for Nebulization 3 milliLiter(s) Nebulizer every 6 hours  aspirin enteric coated 81 milliGRAM(s) Oral daily  atorvastatin 40 milliGRAM(s) Oral at bedtime  budesonide 160 MICROgram(s)/formoterol 4.5 MICROgram(s) Inhaler 2 Puff(s) Inhalation two times a day  chlorhexidine 4% Liquid 1 Application(s) Topical <User Schedule>  dextrose 5%. 1000 milliLiter(s) (50 mL/Hr) IV Continuous <Continuous>  dextrose 50% Injectable 12.5 Gram(s) IV Push once  dextrose 50% Injectable 25 Gram(s) IV Push once  dextrose 50% Injectable 25 Gram(s) IV Push once  enoxaparin Injectable 40 milliGRAM(s) SubCutaneous daily  furosemide    Tablet 20 milliGRAM(s) Oral daily  insulin glargine Injectable (LANTUS) 66 Unit(s) SubCutaneous at bedtime  insulin lispro (HumaLOG) corrective regimen sliding scale   SubCutaneous three times a day before meals  insulin lispro (HumaLOG) corrective regimen sliding scale   SubCutaneous at bedtime  insulin lispro Injectable (HumaLOG) 25 Unit(s) SubCutaneous three times a day with meals  lisinopril 2.5 milliGRAM(s) Oral daily  melatonin 5 milliGRAM(s) Oral at bedtime  nicotine - Inhaler 1 Each Inhalation daily  pantoprazole    Tablet 40 milliGRAM(s) Oral daily  risperiDONE   Tablet 0.5 milliGRAM(s) Oral three times a day  sertraline 50 milliGRAM(s) Oral at bedtime    MEDICATIONS  (PRN):  acetaminophen   Tablet .. 650 milliGRAM(s) Oral every 6 hours PRN Mild Pain (1 - 3)  aluminum hydroxide/magnesium hydroxide/simethicone Suspension 30 milliLiter(s) Oral every 4 hours PRN Dyspepsia  dextrose 40% Gel 15 Gram(s) Oral once PRN Blood Glucose LESS THAN 70 milliGRAM(s)/deciliter  glucagon  Injectable 1 milliGRAM(s) IntraMuscular once PRN Glucose LESS THAN 70 milligrams/deciliter  oxycodone    5 mG/acetaminophen 325 mG 2 Tablet(s) Oral every 6 hours PRN Moderate Pain (4 - 6)      Care Discussed with Consultants/Other Providers [ ] YES  [ ] NO

## 2020-01-15 NOTE — PROGRESS NOTE ADULT - ASSESSMENT
Assessment  DMT2: 59y Male with DM T2 with hyperglycemia, now on basal bolus insulin, increased pre-meal dose yesterday, blood sugars still running high and not at target (, 254), no hypoglycemic episodes. Patient is eating meals with good appetite, comfortable and alert, awaiting rehab placement.  CAD: on medications, no chest pain, stable, monitored.  HTN: Controlled,  on antihypertensive medications.  Overweight/Obesity: No strict exercise routines, not on any weight loss program, neither on low calorie diet.          Laury Romero MD  Cell: [phone]  Office: [phone] Assessment  DMT2: 59y Male with DM T2 with hyperglycemia, now on basal bolus insulin, increased pre-meal dose yesterday, blood sugars still running high and not at target (, 254), no hypoglycemic episodes. Patient is eating meals with good appetite, comfortable and alert, awaiting rehab placement.  CAD: on medications, no chest pain, stable, monitored.  HTN: Controlled,  on antihypertensive medications.  Overweight/Obesity: No strict exercise routines, not on any weight loss program, neither on low  calorie diet.          Laury Romero MD  Cell: [phone]  Office: [phone]

## 2020-01-15 NOTE — PROGRESS NOTE ADULT - PROBLEM SELECTOR PLAN 1
Will increase Lantus to 66u at bedtime.  Will increase Humalog to 25u before each meal and continue Humalog correction scale coverage. Will continue monitoring FS and FU.  Suggest to DC to rehab on basal bolus insulin; FU endo.  Patient counseled for compliance with consistent low carb diet, exercise as tolerated as out patient. Suggest to DC to rehab on basal bolus insulin; FU endo.  Patient counseled for compliance with consistent low carb diet, exercise as tolerated as out patient.

## 2020-01-15 NOTE — PROGRESS NOTE ADULT - SUBJECTIVE AND OBJECTIVE BOX
[facility] [place] [facility]   MD JONAH SNOW MD RUORU WONG, PA    TEL:  OFFICE: [phone]  DR LUGO CELL: [phone]  PATRIZIA BRISCOE CELL: [phone]  DR. FINCH CELL: [phone]  DR. BARRIOS CELL: [phone]    FROM 5 PM - 7 AM PLEASE CALL ANSWERING SERVICE: [phone]    RENAL FOLLOW UP NOTE  --------------------------------------------------------------------------------  HPI:      Pt seen and examined at bedside.   Very sleepy  Denies SOB, chest pain     PAST HISTORY  --------------------------------------------------------------------------------  No significant changes to PMH, PSH, FHx, SHx, unless otherwise noted    ALLERGIES & MEDICATIONS  --------------------------------------------------------------------------------  Allergies    No Known Allergies    Intolerances      Standing Inpatient Medications  albuterol/ipratropium for Nebulization 3 milliLiter(s) Nebulizer every 6 hours  aspirin enteric coated 81 milliGRAM(s) Oral daily  atorvastatin 40 milliGRAM(s) Oral at bedtime  budesonide 160 MICROgram(s)/formoterol 4.5 MICROgram(s) Inhaler 2 Puff(s) Inhalation two times a day  chlorhexidine 4% Liquid 1 Application(s) Topical <User Schedule>  dextrose 5%. 1000 milliLiter(s) IV Continuous <Continuous>  dextrose 50% Injectable 12.5 Gram(s) IV Push once  dextrose 50% Injectable 25 Gram(s) IV Push once  dextrose 50% Injectable 25 Gram(s) IV Push once  enoxaparin Injectable 40 milliGRAM(s) SubCutaneous daily  furosemide    Tablet 20 milliGRAM(s) Oral daily  insulin glargine Injectable (LANTUS) 60 Unit(s) SubCutaneous at bedtime  insulin lispro (HumaLOG) corrective regimen sliding scale   SubCutaneous three times a day before meals  insulin lispro (HumaLOG) corrective regimen sliding scale   SubCutaneous at bedtime  insulin lispro Injectable (HumaLOG) 22 Unit(s) SubCutaneous three times a day with meals  lisinopril 2.5 milliGRAM(s) Oral daily  melatonin 5 milliGRAM(s) Oral at bedtime  nicotine - Inhaler 1 Each Inhalation daily  pantoprazole    Tablet 40 milliGRAM(s) Oral daily  risperiDONE   Tablet 0.5 milliGRAM(s) Oral three times a day  sertraline 50 milliGRAM(s) Oral at bedtime    PRN Inpatient Medications  acetaminophen   Tablet .. 650 milliGRAM(s) Oral every 6 hours PRN  aluminum hydroxide/magnesium hydroxide/simethicone Suspension 30 milliLiter(s) Oral every 4 hours PRN  dextrose 40% Gel 15 Gram(s) Oral once PRN  glucagon  Injectable 1 milliGRAM(s) IntraMuscular once PRN  oxycodone    5 mG/acetaminophen 325 mG 2 Tablet(s) Oral every 6 hours PRN      REVIEW OF SYSTEMS  --------------------------------------------------------------------------------  General: no fever  CVS: no chest pain  RESP: no sob, no cough  ABD: no abdominal pain  : no dysuria,  MSK: no edema     VITALS/PHYSICAL EXAM  --------------------------------------------------------------------------------  T(C): 36.4 (01-15-20 @ 04:35), Max: 36.7 (01-14-20 @ 19:57)  HR: 72 (01-15-20 @ 04:35) (72 - 94)  BP: 109/72 (01-15-20 @ 04:35) (102/63 - 115/68)  RR: 18 (01-15-20 @ 04:35) (17 - 18)  SpO2: 95% (01-15-20 @ 04:35) (93% - 95%)  Wt(kg): --        01-14-20 @ 07:01  -  01-15-20 @ 07:00  --------------------------------------------------------  IN: 950 mL / OUT: 2050 mL / NET: -1100 mL      Physical Exam:  	Gen: NAD  	HEENT: MMM  	Pulm: CTA B/L  	CV: S1S2  	Abd: Soft, +BS  	Ext: No LE edema B/L                      Neuro: Awake , non focal  	Skin: Warm and Dry   BRANDON mark  LABS/STUDIES  --------------------------------------------------------------------------------              12.0   9.25  >-----------<  229      [01-14-20 @ 09:37]              39.0     131  |  93  |  12  ----------------------------<  245      [01-14-20 @ 06:54]  4.5   |  27  |  0.60        Ca     9.0     [01-14-20 @ 06:54]            Creatinine Trend:  SCr 0.60 [01-14 @ 06:54]  SCr 0.61 [01-12 @ 06:21]  SCr 0.67 [01-05 @ 06:48]  SCr 0.61 [01-04 @ 03:47]  SCr 0.70 [01-03 @ 17:56]    Urinalysis - [01-03-20 @ 22:18]      Color Light Yellow / Appearance Clear / SG 1.035 / pH 6.0      Gluc 1000 mg/dL / Ketone Small  / Bili Negative / Urobili Negative       Blood Small / Protein Negative / Leuk Est Negative / Nitrite Negative      RBC 11 / WBC 0 / Hyaline 0 / Gran  / Sq Epi  / Non Sq Epi 0 / Bacteria Negative      HbA1c 10.4      [12-04-19 @ 08:53]

## 2020-01-15 NOTE — PROGRESS NOTE ADULT - SUBJECTIVE AND OBJECTIVE BOX
Chief complaint  Patient is a 59y old  Male who presents with a chief complaint of 59M w/ n/v/d and hyperglycemia sent in from [facility] (15 Piter 2020 08:11)   Review of systems  Patient in bed, looks comfortable, no fever, no hypoglycemia.    Labs and Fingersticks  CAPILLARY BLOOD GLUCOSE      POCT Blood Glucose.: 254 mg/dL (15 Piter 2020 12:10)  POCT Blood Glucose.: 223 mg/dL (15 Piter 2020 08:08)  POCT Blood Glucose.: 285 mg/dL (14 Jan 2020 22:03)  POCT Blood Glucose.: 148 mg/dL (14 Jan 2020 17:09)      Anion Gap, Serum: 11 (01-14 @ 06:54)      Calcium, Total Serum: 9.0 (01-14 @ 06:54)          01-14    131<L>  |  93<L>  |  12  ----------------------------<  245<H>  4.5   |  27  |  0.60    Ca    9.0      14 Jan 2020 06:54                          12.0   9.25  )-----------( 229      ( 14 Jan 2020 09:37 )             39.0     Medications  MEDICATIONS  (STANDING):  albuterol/ipratropium for Nebulization 3 milliLiter(s) Nebulizer every 6 hours  aspirin enteric coated 81 milliGRAM(s) Oral daily  atorvastatin 40 milliGRAM(s) Oral at bedtime  budesonide 160 MICROgram(s)/formoterol 4.5 MICROgram(s) Inhaler 2 Puff(s) Inhalation two times a day  chlorhexidine 4% Liquid 1 Application(s) Topical <User Schedule>  dextrose 5%. 1000 milliLiter(s) (50 mL/Hr) IV Continuous <Continuous>  dextrose 50% Injectable 12.5 Gram(s) IV Push once  dextrose 50% Injectable 25 Gram(s) IV Push once  dextrose 50% Injectable 25 Gram(s) IV Push once  enoxaparin Injectable 40 milliGRAM(s) SubCutaneous daily  furosemide    Tablet 20 milliGRAM(s) Oral daily  insulin glargine Injectable (LANTUS) 66 Unit(s) SubCutaneous at bedtime  insulin lispro (HumaLOG) corrective regimen sliding scale   SubCutaneous three times a day before meals  insulin lispro (HumaLOG) corrective regimen sliding scale   SubCutaneous at bedtime  insulin lispro Injectable (HumaLOG) 25 Unit(s) SubCutaneous three times a day with meals  lisinopril 2.5 milliGRAM(s) Oral daily  melatonin 5 milliGRAM(s) Oral at bedtime  nicotine - Inhaler 1 Each Inhalation daily  pantoprazole    Tablet 40 milliGRAM(s) Oral daily  risperiDONE   Tablet 0.5 milliGRAM(s) Oral three times a day  sertraline 50 milliGRAM(s) Oral at bedtime      Physical Exam  General: Patient comfortable in bed  Vital Signs Last 12 Hrs  T(F): 97.5 (01-15-20 @ 04:35), Max: 97.5 (01-15-20 @ 04:35)  HR: 72 (01-15-20 @ 04:35) (72 - 72)  BP: 109/72 (01-15-20 @ 04:35) (109/72 - 109/72)  BP(mean): --  RR: 18 (01-15-20 @ 04:35) (18 - 18)  SpO2: 95% (01-15-20 @ 04:35) (95% - 95%)  Neck: No palpable thyroid nodules.  CVS: S1S2, No murmurs  Respiratory: No wheezing, no crepitations  GI: Abdomen soft, bowel sounds positive  Musculoskeletal:  edema lower extremities.   Skin: No skin rashes, no ecchymosis    Diagnostics Chief complaint  Patient is a 59y old  Male who presents with a chief complaint of 59M w/ n/v/d and hyperglycemia sent in from [facility] (15 Piter 2020 08:11)   Review of systems  Patient in bed, looks comfortable, no fever,  no hypoglycemia.    Labs and Fingersticks  CAPILLARY BLOOD GLUCOSE      POCT Blood Glucose.: 254 mg/dL (15 Piter 2020 12:10)  POCT Blood Glucose.: 223 mg/dL (15 Piter 2020 08:08)  POCT Blood Glucose.: 285 mg/dL (14 Jan 2020 22:03)  POCT Blood Glucose.: 148 mg/dL (14 Jan 2020 17:09)      Anion Gap, Serum: 11 (01-14 @ 06:54)      Calcium, Total Serum: 9.0 (01-14 @ 06:54)          01-14    131<L>  |  93<L>  |  12  ----------------------------<  245<H>  4.5   |  27  |  0.60    Ca    9.0      14 Jan 2020 06:54                          12.0   9.25  )-----------( 229      ( 14 Jan 2020 09:37 )             39.0     Medications  MEDICATIONS  (STANDING):  albuterol/ipratropium for Nebulization 3 milliLiter(s) Nebulizer every 6 hours  aspirin enteric coated 81 milliGRAM(s) Oral daily  atorvastatin 40 milliGRAM(s) Oral at bedtime  budesonide 160 MICROgram(s)/formoterol 4.5 MICROgram(s) Inhaler 2 Puff(s) Inhalation two times a day  chlorhexidine 4% Liquid 1 Application(s) Topical <User Schedule>  dextrose 5%. 1000 milliLiter(s) (50 mL/Hr) IV Continuous <Continuous>  dextrose 50% Injectable 12.5 Gram(s) IV Push once  dextrose 50% Injectable 25 Gram(s) IV Push once  dextrose 50% Injectable 25 Gram(s) IV Push once  enoxaparin Injectable 40 milliGRAM(s) SubCutaneous daily  furosemide    Tablet 20 milliGRAM(s) Oral daily  insulin glargine Injectable (LANTUS) 66 Unit(s) SubCutaneous at bedtime  insulin lispro (HumaLOG) corrective regimen sliding scale   SubCutaneous three times a day before meals  insulin lispro (HumaLOG) corrective regimen sliding scale   SubCutaneous at bedtime  insulin lispro Injectable (HumaLOG) 25 Unit(s) SubCutaneous three times a day with meals  lisinopril 2.5 milliGRAM(s) Oral daily  melatonin 5 milliGRAM(s) Oral at bedtime  nicotine - Inhaler 1 Each Inhalation daily  pantoprazole    Tablet 40 milliGRAM(s) Oral daily  risperiDONE   Tablet 0.5 milliGRAM(s) Oral three times a day  sertraline 50 milliGRAM(s) Oral at bedtime      Physical Exam  General: Patient comfortable in bed  Vital Signs Last 12 Hrs  T(F): 97.5 (01-15-20 @ 04:35), Max: 97.5 (01-15-20 @ 04:35)  HR: 72 (01-15-20 @ 04:35) (72 - 72)  BP: 109/72 (01-15-20 @ 04:35) (109/72 - 109/72)  BP(mean): --  RR: 18 (01-15-20 @ 04:35) (18 - 18)  SpO2: 95% (01-15-20 @ 04:35) (95% - 95%)  Neck: No palpable thyroid nodules.  CVS: S1S2, No murmurs  Respiratory: No wheezing, no crepitations  GI: Abdomen soft, bowel sounds positive  Musculoskeletal:  edema lower extremities.   Skin: No skin rashes, no ecchymosis    Diagnostics

## 2020-01-16 LAB
GLUCOSE BLDC GLUCOMTR-MCNC: 116 MG/DL — HIGH (ref 70–99)
GLUCOSE BLDC GLUCOMTR-MCNC: 164 MG/DL — HIGH (ref 70–99)
GLUCOSE BLDC GLUCOMTR-MCNC: 185 MG/DL — HIGH (ref 70–99)
GLUCOSE BLDC GLUCOMTR-MCNC: 186 MG/DL — HIGH (ref 70–99)

## 2020-01-16 RX ADMIN — OXYCODONE AND ACETAMINOPHEN 2 TABLET(S): 5; 325 TABLET ORAL at 08:59

## 2020-01-16 RX ADMIN — ATORVASTATIN CALCIUM 40 MILLIGRAM(S): 80 TABLET, FILM COATED ORAL at 22:07

## 2020-01-16 RX ADMIN — OXYCODONE AND ACETAMINOPHEN 2 TABLET(S): 5; 325 TABLET ORAL at 17:34

## 2020-01-16 RX ADMIN — Medication 25 UNIT(S): at 13:05

## 2020-01-16 RX ADMIN — RISPERIDONE 0.5 MILLIGRAM(S): 4 TABLET ORAL at 13:06

## 2020-01-16 RX ADMIN — OXYCODONE AND ACETAMINOPHEN 2 TABLET(S): 5; 325 TABLET ORAL at 00:44

## 2020-01-16 RX ADMIN — Medication 3 MILLILITER(S): at 06:16

## 2020-01-16 RX ADMIN — SERTRALINE 50 MILLIGRAM(S): 25 TABLET, FILM COATED ORAL at 22:07

## 2020-01-16 RX ADMIN — Medication 81 MILLIGRAM(S): at 11:03

## 2020-01-16 RX ADMIN — Medication 25 UNIT(S): at 08:54

## 2020-01-16 RX ADMIN — OXYCODONE AND ACETAMINOPHEN 2 TABLET(S): 5; 325 TABLET ORAL at 09:29

## 2020-01-16 RX ADMIN — Medication 3 MILLILITER(S): at 17:04

## 2020-01-16 RX ADMIN — Medication 3 MILLILITER(S): at 11:03

## 2020-01-16 RX ADMIN — OXYCODONE AND ACETAMINOPHEN 2 TABLET(S): 5; 325 TABLET ORAL at 17:04

## 2020-01-16 RX ADMIN — ENOXAPARIN SODIUM 40 MILLIGRAM(S): 100 INJECTION SUBCUTANEOUS at 11:03

## 2020-01-16 RX ADMIN — Medication 1: at 08:54

## 2020-01-16 RX ADMIN — LISINOPRIL 2.5 MILLIGRAM(S): 2.5 TABLET ORAL at 06:17

## 2020-01-16 RX ADMIN — Medication 1: at 13:05

## 2020-01-16 RX ADMIN — Medication 5 MILLIGRAM(S): at 22:06

## 2020-01-16 RX ADMIN — BUDESONIDE AND FORMOTEROL FUMARATE DIHYDRATE 2 PUFF(S): 160; 4.5 AEROSOL RESPIRATORY (INHALATION) at 17:04

## 2020-01-16 RX ADMIN — RISPERIDONE 0.5 MILLIGRAM(S): 4 TABLET ORAL at 06:17

## 2020-01-16 RX ADMIN — Medication 20 MILLIGRAM(S): at 06:17

## 2020-01-16 RX ADMIN — PANTOPRAZOLE SODIUM 40 MILLIGRAM(S): 20 TABLET, DELAYED RELEASE ORAL at 11:03

## 2020-01-16 RX ADMIN — BUDESONIDE AND FORMOTEROL FUMARATE DIHYDRATE 2 PUFF(S): 160; 4.5 AEROSOL RESPIRATORY (INHALATION) at 06:16

## 2020-01-16 RX ADMIN — INSULIN GLARGINE 66 UNIT(S): 100 INJECTION, SOLUTION SUBCUTANEOUS at 22:05

## 2020-01-16 RX ADMIN — Medication 1 EACH: at 11:03

## 2020-01-16 RX ADMIN — RISPERIDONE 0.5 MILLIGRAM(S): 4 TABLET ORAL at 22:07

## 2020-01-16 RX ADMIN — Medication 25 UNIT(S): at 17:56

## 2020-01-16 RX ADMIN — CHLORHEXIDINE GLUCONATE 1 APPLICATION(S): 213 SOLUTION TOPICAL at 11:03

## 2020-01-16 NOTE — PROGRESS NOTE ADULT - SUBJECTIVE AND OBJECTIVE BOX
[facility]   MD José Miguel Burroughs MD, D.O. Ruoru Wong, PA    From 7 AM - 5 PM:  OFFICE: [phone]  Dr. Juarez cell: [phone]  Dr. Da Silva cell: [phone]  Dr. Kwong cell: [phone]  ALVARO Spencer cell: [phone]    From 5 PM - 7 AM: Answering Service: [phone]      RENAL FOLLOW UP NOTE  --------------------------------------------------------------------------------  HPI: Pt seen and examined at bedside. Eating and drinking well. No nausea/ pain     PAST HISTORY  --------------------------------------------------------------------------------  No significant changes to PMH, PSH, FHx, SHx, unless otherwise noted    ALLERGIES & MEDICATIONS  --------------------------------------------------------------------------------  Allergies    No Known Allergies    Intolerances      Standing Inpatient Medications  albuterol/ipratropium for Nebulization 3 milliLiter(s) Nebulizer every 6 hours  aspirin enteric coated 81 milliGRAM(s) Oral daily  atorvastatin 40 milliGRAM(s) Oral at bedtime  budesonide 160 MICROgram(s)/formoterol 4.5 MICROgram(s) Inhaler 2 Puff(s) Inhalation two times a day  chlorhexidine 4% Liquid 1 Application(s) Topical <User Schedule>  dextrose 5%. 1000 milliLiter(s) IV Continuous <Continuous>  dextrose 50% Injectable 12.5 Gram(s) IV Push once  dextrose 50% Injectable 25 Gram(s) IV Push once  dextrose 50% Injectable 25 Gram(s) IV Push once  enoxaparin Injectable 40 milliGRAM(s) SubCutaneous daily  furosemide    Tablet 20 milliGRAM(s) Oral daily  insulin glargine Injectable (LANTUS) 66 Unit(s) SubCutaneous at bedtime  insulin lispro (HumaLOG) corrective regimen sliding scale   SubCutaneous three times a day before meals  insulin lispro (HumaLOG) corrective regimen sliding scale   SubCutaneous at bedtime  insulin lispro Injectable (HumaLOG) 25 Unit(s) SubCutaneous three times a day with meals  lisinopril 2.5 milliGRAM(s) Oral daily  melatonin 5 milliGRAM(s) Oral at bedtime  nicotine - Inhaler 1 Each Inhalation daily  pantoprazole    Tablet 40 milliGRAM(s) Oral daily  risperiDONE   Tablet 0.5 milliGRAM(s) Oral three times a day  sertraline 50 milliGRAM(s) Oral at bedtime    PRN Inpatient Medications  acetaminophen   Tablet .. 650 milliGRAM(s) Oral every 6 hours PRN  aluminum hydroxide/magnesium hydroxide/simethicone Suspension 30 milliLiter(s) Oral every 4 hours PRN  dextrose 40% Gel 15 Gram(s) Oral once PRN  glucagon  Injectable 1 milliGRAM(s) IntraMuscular once PRN  oxycodone    5 mG/acetaminophen 325 mG 2 Tablet(s) Oral every 6 hours PRN      REVIEW OF SYSTEMS  --------------------------------------------------------------------------------  General: no fever  CVS: no chest pain  RESP: no sob, no cough  ABD: no abdominal pain  : no dysuria  MSK: no edema     VITALS/PHYSICAL EXAM  --------------------------------------------------------------------------------  T(C): 36.3 (01-16-20 @ 06:14), Max: 36.8 (01-15-20 @ 14:17)  HR: 64 (01-16-20 @ 06:14) (64 - 92)  BP: 106/64 (01-16-20 @ 06:14) (101/67 - 117/73)  RR: 18 (01-16-20 @ 06:14) (18 - 18)  SpO2: 96% (01-16-20 @ 06:14) (94% - 96%)  Wt(kg): --        01-15-20 @ 07:01  -  01-16-20 @ 07:00  --------------------------------------------------------  IN: 690 mL / OUT: 3000 mL / NET: -2310 mL    01-16-20 @ 07:01  -  01-16-20 @ 09:57  --------------------------------------------------------  IN: 300 mL / OUT: 550 mL / NET: -250 mL    Physical Exam:  	Gen: NAD  	HEENT: MMM  	Pulm: CTA B/L  	CV: S1S2  	Abd: Soft, +BS  	Ext: No LE edema B/L                      Neuro: Awake   	Skin: Warm and Dry     LABS/STUDIES  --------------------------------------------------------------------------------  Pending    Creatinine Trend:  SCr 0.60 [01-14 @ 06:54]  SCr 0.61 [01-12 @ 06:21]  SCr 0.67 [01-05 @ 06:48]  SCr 0.61 [01-04 @ 03:47]  SCr 0.70 [01-03 @ 17:56]    Urinalysis - [01-03-20 @ 22:18]      Color Light Yellow / Appearance Clear / SG 1.035 / pH 6.0      Gluc 1000 mg/dL / Ketone Small  / Bili Negative / Urobili Negative       Blood Small / Protein Negative / Leuk Est Negative / Nitrite Negative      RBC 11 / WBC 0 / Hyaline 0 / Gran  / Sq Epi  / Non Sq Epi 0 / Bacteria Negative      HbA1c 10.4      [12-04-19 @ 08:53]

## 2020-01-16 NOTE — PROGRESS NOTE ADULT - ASSESSMENT
59M w/ COPD on 2L NC, DM2 on insulin, CAD s/p stent, HFpEF, pulm HTN, HTN, charted hx of schizophrenia/bipolar disorder, panic disorder, and recent admit for COPD exacerbation d/c 12/12/19 presents from [facility] (SNF) for n/v/d and hyperglycemia. Admitted with diabetic ketoacidosis, found to have hyponatremia    A/P:    Hyponatremia:  Likely sec to hyperglycemia/dehydration  Also on SSRI  Optimize glucose control  No new labs for review  Monitor Na level at present  Continue lasix at present in view of h/o CHF-Diastolic  Promote oral solute intake, avoid excessive free water intake     HTN:  Controlled   Monitor at present

## 2020-01-16 NOTE — PROGRESS NOTE ADULT - PROBLEM SELECTOR PLAN 1
Will continue current insulin regimen for now; Will continue monitoring FS and FU.  Suggest to DC to rehab on basal bolus insulin; FU endo.  Patient counseled for compliance with consistent low carb diet, exercise as tolerated as out patient. Will continue current insulin regimen for now; Will continue monitoring FS and FU.  Patient counseled for compliance with consistent low carb diet, exercise as tolerated as out patient.

## 2020-01-16 NOTE — PROGRESS NOTE ADULT - SUBJECTIVE AND OBJECTIVE BOX
Patient is a 59y old  Male who presents with a chief complaint of 59M w/ n/v/d and hyperglycemia sent in from SNF (16 Jan 2020 14:04)    pt. seen and examined, doing fair, no c/o  awaiting placement  INTERVAL HPI/OVERNIGHT EVENTS:  T(C): 36.7 (01-17-20 @ 00:16), Max: 36.7 (01-17-20 @ 00:16)  HR: 87 (01-17-20 @ 00:16) (64 - 87)  BP: 109/71 (01-17-20 @ 00:16) (97/67 - 109/71)  RR: 18 (01-17-20 @ 00:16) (18 - 18)  SpO2: 95% (01-17-20 @ 00:16) (93% - 96%)  Wt(kg): --  I&O's Summary    15 Piter 2020 07:01  -  16 Jan 2020 07:00  --------------------------------------------------------  IN: 690 mL / OUT: 3000 mL / NET: -2310 mL    16 Jan 2020 07:01  -  17 Jan 2020 01:26  --------------------------------------------------------  IN: 1037 mL / OUT: 1250 mL / NET: -213 mL        PAST MEDICAL & SURGICAL HISTORY:  Oxygen dependent  Gastroesophageal reflux disease, esophagitis presence not specified  Smoker  Bipolar 1 disorder  Coronary artery disease involving native coronary artery of native heart without angina pectoris  Type 2 diabetes mellitus without complication, with long-term current use of insulin  Pulmonary HTN  HLD (hyperlipidemia)  Stented coronary artery  COPD (chronic obstructive pulmonary disease)  No significant past surgical history      SOCIAL HISTORY  Alcohol:  Tobacco:  Illicit substance use:    FAMILY HISTORY:    REVIEW OF SYSTEMS:  CONSTITUTIONAL: No fever, weight loss, or fatigue  EYES: No eye pain, visual disturbances, or discharge  ENMT:  No difficulty hearing, tinnitus, vertigo; No sinus or throat pain  NECK: No pain or stiffness  RESPIRATORY: No cough, wheezing, chills or hemoptysis; No shortness of breath  CARDIOVASCULAR: No chest pain, palpitations, dizziness, or leg swelling  GASTROINTESTINAL: No abdominal or epigastric pain. No nausea, vomiting, or hematemesis; No diarrhea or constipation. No melena or hematochezia.  GENITOURINARY: No dysuria, frequency, hematuria, or incontinence  NEUROLOGICAL: No headaches, memory loss, loss of strength, numbness, or tremors  SKIN: No itching, burning, rashes, or lesions   LYMPH NODES: No enlarged glands  ENDOCRINE: No heat or cold intolerance; No hair loss  MUSCULOSKELETAL: No joint pain or swelling; No muscle, back, or extremity pain  PSYCHIATRIC: No depression, anxiety, mood swings, or difficulty sleeping  HEME/LYMPH: No easy bruising, or bleeding gums  ALLERY AND IMMUNOLOGIC: No hives or eczema    RADIOLOGY & ADDITIONAL TESTS:    Imaging Personally Reviewed:  [ ] YES  [ ] NO    Consultant(s) Notes Reviewed:  [ ] YES  [ ] NO    PHYSICAL EXAM:  GENERAL: NAD, well-groomed, well-developed  HEAD:  Atraumatic, Normocephalic  EYES: EOMI, PERRLA, conjunctiva and sclera clear  ENMT: No tonsillar erythema, exudates, or enlargement; Moist mucous membranes, Good dentition, No lesions  NECK: Supple, No JVD, Normal thyroid  NERVOUS SYSTEM:  Alert & Oriented X3, Good concentration; Motor Strength 5/5 B/L upper and lower extremities; DTRs 2+ intact and symmetric  CHEST/LUNG: Clear to percussion bilaterally; No rales, rhonchi, wheezing, or rubs  HEART: Regular rate and rhythm; No murmurs, rubs, or gallops  ABDOMEN: Soft, Nontender, Nondistended; Bowel sounds present  EXTREMITIES:  2+ Peripheral Pulses, No clubbing, cyanosis, or edema  LYMPH: No lymphadenopathy noted  SKIN: No rashes or lesions    LABS:              CAPILLARY BLOOD GLUCOSE      POCT Blood Glucose.: 185 mg/dL (16 Jan 2020 21:43)  POCT Blood Glucose.: 116 mg/dL (16 Jan 2020 17:20)  POCT Blood Glucose.: 186 mg/dL (16 Jan 2020 12:13)  POCT Blood Glucose.: 164 mg/dL (16 Jan 2020 08:10)            MEDICATIONS  (STANDING):  albuterol/ipratropium for Nebulization 3 milliLiter(s) Nebulizer every 6 hours  aspirin enteric coated 81 milliGRAM(s) Oral daily  atorvastatin 40 milliGRAM(s) Oral at bedtime  budesonide 160 MICROgram(s)/formoterol 4.5 MICROgram(s) Inhaler 2 Puff(s) Inhalation two times a day  chlorhexidine 4% Liquid 1 Application(s) Topical <User Schedule>  dextrose 5%. 1000 milliLiter(s) (50 mL/Hr) IV Continuous <Continuous>  dextrose 50% Injectable 12.5 Gram(s) IV Push once  dextrose 50% Injectable 25 Gram(s) IV Push once  dextrose 50% Injectable 25 Gram(s) IV Push once  enoxaparin Injectable 40 milliGRAM(s) SubCutaneous daily  furosemide    Tablet 20 milliGRAM(s) Oral daily  insulin glargine Injectable (LANTUS) 66 Unit(s) SubCutaneous at bedtime  insulin lispro (HumaLOG) corrective regimen sliding scale   SubCutaneous three times a day before meals  insulin lispro (HumaLOG) corrective regimen sliding scale   SubCutaneous at bedtime  insulin lispro Injectable (HumaLOG) 25 Unit(s) SubCutaneous three times a day with meals  lisinopril 2.5 milliGRAM(s) Oral daily  melatonin 5 milliGRAM(s) Oral at bedtime  nicotine - Inhaler 1 Each Inhalation daily  pantoprazole    Tablet 40 milliGRAM(s) Oral daily  risperiDONE   Tablet 0.5 milliGRAM(s) Oral three times a day  sertraline 50 milliGRAM(s) Oral at bedtime    MEDICATIONS  (PRN):  acetaminophen   Tablet .. 650 milliGRAM(s) Oral every 6 hours PRN Mild Pain (1 - 3)  aluminum hydroxide/magnesium hydroxide/simethicone Suspension 30 milliLiter(s) Oral every 4 hours PRN Dyspepsia  dextrose 40% Gel 15 Gram(s) Oral once PRN Blood Glucose LESS THAN 70 milliGRAM(s)/deciliter  glucagon  Injectable 1 milliGRAM(s) IntraMuscular once PRN Glucose LESS THAN 70 milligrams/deciliter  oxycodone    5 mG/acetaminophen 325 mG 2 Tablet(s) Oral every 6 hours PRN Moderate Pain (4 - 6)      Care Discussed with Consultants/Other Providers [ ] YES  [ ] NO

## 2020-01-16 NOTE — PROGRESS NOTE ADULT - ASSESSMENT
Assessment  DMT2: 59y Male with DM T2 with hyperglycemia, now on basal bolus insulin, increased dose yesterday, blood sugars improving, no hypoglycemic episodes. Patient is eating meals with good appetite, comfortable and alert, awaiting rehab placement.  CAD: on medications, no chest pain, stable, monitored.  HTN: Controlled,  on antihypertensive medications.  Overweight/Obesity: No strict exercise routines, not on any weight loss program, neither on low  calorie diet.          Laury Romero MD  Cell: [phone]  Office: [phone] Assessment  DMT2: 59y Male with DM T2 with hyperglycemia, now on basal bolus insulin, increased dose yesterday, blood sugars improving, no hypoglycemic episodes. Patient is eating meals with good appetite,  comfortable and alert, awaiting rehab placement.  CAD: on medications, no chest pain, stable, monitored.  HTN: Controlled,  on antihypertensive medications.  Overweight/Obesity: No strict exercise routines, not on any weight loss program, neither on low  calorie diet.          Laury Romero MD  Cell: [phone]  Office: [phone]

## 2020-01-16 NOTE — PROGRESS NOTE ADULT - SUBJECTIVE AND OBJECTIVE BOX
Chief complaint  Patient is a 59y old  Male who presents with a chief complaint of 59M w/ n/v/d and hyperglycemia sent in from [facility] (16 Jan 2020 09:56)   Review of systems  Patient in bed, looks comfortable, no fever, no hypoglycemia.    Labs and Fingersticks  CAPILLARY BLOOD GLUCOSE      POCT Blood Glucose.: 186 mg/dL (16 Jan 2020 12:13)  POCT Blood Glucose.: 164 mg/dL (16 Jan 2020 08:10)  POCT Blood Glucose.: 233 mg/dL (15 Piter 2020 22:03)  POCT Blood Glucose.: 171 mg/dL (15 Piter 2020 17:08)                        Medications  MEDICATIONS  (STANDING):  albuterol/ipratropium for Nebulization 3 milliLiter(s) Nebulizer every 6 hours  aspirin enteric coated 81 milliGRAM(s) Oral daily  atorvastatin 40 milliGRAM(s) Oral at bedtime  budesonide 160 MICROgram(s)/formoterol 4.5 MICROgram(s) Inhaler 2 Puff(s) Inhalation two times a day  chlorhexidine 4% Liquid 1 Application(s) Topical <User Schedule>  dextrose 5%. 1000 milliLiter(s) (50 mL/Hr) IV Continuous <Continuous>  dextrose 50% Injectable 12.5 Gram(s) IV Push once  dextrose 50% Injectable 25 Gram(s) IV Push once  dextrose 50% Injectable 25 Gram(s) IV Push once  enoxaparin Injectable 40 milliGRAM(s) SubCutaneous daily  furosemide    Tablet 20 milliGRAM(s) Oral daily  insulin glargine Injectable (LANTUS) 66 Unit(s) SubCutaneous at bedtime  insulin lispro (HumaLOG) corrective regimen sliding scale   SubCutaneous three times a day before meals  insulin lispro (HumaLOG) corrective regimen sliding scale   SubCutaneous at bedtime  insulin lispro Injectable (HumaLOG) 25 Unit(s) SubCutaneous three times a day with meals  lisinopril 2.5 milliGRAM(s) Oral daily  melatonin 5 milliGRAM(s) Oral at bedtime  nicotine - Inhaler 1 Each Inhalation daily  pantoprazole    Tablet 40 milliGRAM(s) Oral daily  risperiDONE   Tablet 0.5 milliGRAM(s) Oral three times a day  sertraline 50 milliGRAM(s) Oral at bedtime      Physical Exam  General: Patient comfortable in bed  Vital Signs Last 12 Hrs  T(F): 97.2 (01-16-20 @ 12:00), Max: 97.4 (01-16-20 @ 06:14)  HR: 70 (01-16-20 @ 12:00) (64 - 70)  BP: 99/63 (01-16-20 @ 12:00) (99/63 - 106/64)  BP(mean): --  RR: 18 (01-16-20 @ 12:00) (18 - 18)  SpO2: 96% (01-16-20 @ 12:00) (96% - 96%)  Neck: No palpable thyroid nodules.  CVS: S1S2, No murmurs  Respiratory: No wheezing, no crepitations  GI: Abdomen soft, bowel sounds positive  Musculoskeletal:  edema lower extremities.   Skin: No skin rashes, no ecchymosis    Diagnostics Chief complaint  Patient is a 59y old  Male who presents with a chief complaint of 59M w/ n/v/d and hyperglycemia sent in from [facility] (16 Jan 2020 09:56)   Review of systems  Patient in bed, looks comfortable,  no fever, no hypoglycemia.    Labs and Fingersticks  CAPILLARY BLOOD GLUCOSE      POCT Blood Glucose.: 186 mg/dL (16 Jan 2020 12:13)  POCT Blood Glucose.: 164 mg/dL (16 Jan 2020 08:10)  POCT Blood Glucose.: 233 mg/dL (15 Piter 2020 22:03)  POCT Blood Glucose.: 171 mg/dL (15 Piter 2020 17:08)                        Medications  MEDICATIONS  (STANDING):  albuterol/ipratropium for Nebulization 3 milliLiter(s) Nebulizer every 6 hours  aspirin enteric coated 81 milliGRAM(s) Oral daily  atorvastatin 40 milliGRAM(s) Oral at bedtime  budesonide 160 MICROgram(s)/formoterol 4.5 MICROgram(s) Inhaler 2 Puff(s) Inhalation two times a day  chlorhexidine 4% Liquid 1 Application(s) Topical <User Schedule>  dextrose 5%. 1000 milliLiter(s) (50 mL/Hr) IV Continuous <Continuous>  dextrose 50% Injectable 12.5 Gram(s) IV Push once  dextrose 50% Injectable 25 Gram(s) IV Push once  dextrose 50% Injectable 25 Gram(s) IV Push once  enoxaparin Injectable 40 milliGRAM(s) SubCutaneous daily  furosemide    Tablet 20 milliGRAM(s) Oral daily  insulin glargine Injectable (LANTUS) 66 Unit(s) SubCutaneous at bedtime  insulin lispro (HumaLOG) corrective regimen sliding scale   SubCutaneous three times a day before meals  insulin lispro (HumaLOG) corrective regimen sliding scale   SubCutaneous at bedtime  insulin lispro Injectable (HumaLOG) 25 Unit(s) SubCutaneous three times a day with meals  lisinopril 2.5 milliGRAM(s) Oral daily  melatonin 5 milliGRAM(s) Oral at bedtime  nicotine - Inhaler 1 Each Inhalation daily  pantoprazole    Tablet 40 milliGRAM(s) Oral daily  risperiDONE   Tablet 0.5 milliGRAM(s) Oral three times a day  sertraline 50 milliGRAM(s) Oral at bedtime      Physical Exam  General: Patient comfortable in bed  Vital Signs Last 12 Hrs  T(F): 97.2 (01-16-20 @ 12:00), Max: 97.4 (01-16-20 @ 06:14)  HR: 70 (01-16-20 @ 12:00) (64 - 70)  BP: 99/63 (01-16-20 @ 12:00) (99/63 - 106/64)  BP(mean): --  RR: 18 (01-16-20 @ 12:00) (18 - 18)  SpO2: 96% (01-16-20 @ 12:00) (96% - 96%)  Neck: No palpable thyroid nodules.  CVS: S1S2, No murmurs  Respiratory: No wheezing, no crepitations  GI: Abdomen soft, bowel sounds positive  Musculoskeletal:  edema lower extremities.   Skin: No skin rashes, no ecchymosis    Diagnostics

## 2020-01-16 NOTE — CHART NOTE - NSCHARTNOTEFT_GEN_A_CORE
Nutrition follow up  Chart reviewed, events. labs noted    "Patient is a 59y old  Male who presents with hyperglycemia, now on basal bolus insulin, increased dose yesterday, blood sugars improving, no hypoglycemic episodes. Patient is eating meals with good appetite, comfortable and alert, awaiting rehab placement."        Patient requested to see dietitian to request more food at meals. Patient states the portions are "not large enough".    Patient was explained the importance of a Consistent carbohydrate diet for glucose control. Initially patient provided with diet education at time of Initial dietitian assessment, reinforcement provided. Although patient states he eats 100% of meals and is still hungry. Discussed allowing scrambled eggs at breakfast. Patient encouraged to adhere to diet for continued weight loss.   Reinforced diet adherence/diet education.     Weight admission 104.8kg  1/3/2020  Today's  weight  100kg       1/16/2020  weight loss noted 10 lbs  OLP=980  lbs +/- 10%     70kg      Nutrition DX: knowledge deficit related to Consistent carbohydrate diet  Nutrtion DX ongoing, addressed    Recommendations:   Continue consistent carbohydrate diet; reinforce diet adherence/diet education.

## 2020-01-17 LAB
ANION GAP SERPL CALC-SCNC: 12 MMOL/L — SIGNIFICANT CHANGE UP (ref 5–17)
ANION GAP SERPL CALC-SCNC: 29 MMOL/L — HIGH (ref 5–17)
BUN SERPL-MCNC: 14 MG/DL — SIGNIFICANT CHANGE UP (ref 7–23)
BUN SERPL-MCNC: 17 MG/DL — SIGNIFICANT CHANGE UP (ref 7–23)
CALCIUM SERPL-MCNC: 9.5 MG/DL — SIGNIFICANT CHANGE UP (ref 8.4–10.5)
CALCIUM SERPL-MCNC: <3 MG/DL — CRITICAL LOW (ref 8.4–10.5)
CHLORIDE SERPL-SCNC: 87 MMOL/L — LOW (ref 96–108)
CHLORIDE SERPL-SCNC: 95 MMOL/L — LOW (ref 96–108)
CO2 SERPL-SCNC: 15 MMOL/L — LOW (ref 22–31)
CO2 SERPL-SCNC: 29 MMOL/L — SIGNIFICANT CHANGE UP (ref 22–31)
CREAT SERPL-MCNC: 0.67 MG/DL — SIGNIFICANT CHANGE UP (ref 0.5–1.3)
CREAT SERPL-MCNC: 0.67 MG/DL — SIGNIFICANT CHANGE UP (ref 0.5–1.3)
GLUCOSE BLDC GLUCOMTR-MCNC: 167 MG/DL — HIGH (ref 70–99)
GLUCOSE BLDC GLUCOMTR-MCNC: 178 MG/DL — HIGH (ref 70–99)
GLUCOSE BLDC GLUCOMTR-MCNC: 198 MG/DL — HIGH (ref 70–99)
GLUCOSE BLDC GLUCOMTR-MCNC: 201 MG/DL — HIGH (ref 70–99)
GLUCOSE BLDC GLUCOMTR-MCNC: 216 MG/DL — HIGH (ref 70–99)
GLUCOSE SERPL-MCNC: 172 MG/DL — HIGH (ref 70–99)
GLUCOSE SERPL-MCNC: 172 MG/DL — HIGH (ref 70–99)
HCT VFR BLD CALC: 36.2 % — LOW (ref 39–50)
HGB BLD-MCNC: 11.7 G/DL — LOW (ref 13–17)
MAGNESIUM SERPL-MCNC: 1.8 MG/DL — SIGNIFICANT CHANGE UP (ref 1.6–2.6)
MAGNESIUM SERPL-MCNC: <.6 MG/DL — CRITICAL LOW (ref 1.6–2.6)
MCHC RBC-ENTMCNC: 29 PG — SIGNIFICANT CHANGE UP (ref 27–34)
MCHC RBC-ENTMCNC: 32.3 GM/DL — SIGNIFICANT CHANGE UP (ref 32–36)
MCV RBC AUTO: 89.6 FL — SIGNIFICANT CHANGE UP (ref 80–100)
PHOSPHATE SERPL-MCNC: 4.8 MG/DL — HIGH (ref 2.5–4.5)
PLATELET # BLD AUTO: 216 K/UL — SIGNIFICANT CHANGE UP (ref 150–400)
POTASSIUM SERPL-MCNC: 4.5 MMOL/L — SIGNIFICANT CHANGE UP (ref 3.5–5.3)
POTASSIUM SERPL-MCNC: >9 MMOL/L — CRITICAL HIGH (ref 3.5–5.3)
POTASSIUM SERPL-SCNC: 4.5 MMOL/L — SIGNIFICANT CHANGE UP (ref 3.5–5.3)
POTASSIUM SERPL-SCNC: >9 MMOL/L — CRITICAL HIGH (ref 3.5–5.3)
RBC # BLD: 4.04 M/UL — LOW (ref 4.2–5.8)
RBC # FLD: 12.9 % — SIGNIFICANT CHANGE UP (ref 10.3–14.5)
SODIUM SERPL-SCNC: 131 MMOL/L — LOW (ref 135–145)
SODIUM SERPL-SCNC: 136 MMOL/L — SIGNIFICANT CHANGE UP (ref 135–145)
WBC # BLD: 11.04 K/UL — HIGH (ref 3.8–10.5)
WBC # FLD AUTO: 11.04 K/UL — HIGH (ref 3.8–10.5)

## 2020-01-17 RX ORDER — POLYETHYLENE GLYCOL 3350 17 G/17G
17 POWDER, FOR SOLUTION ORAL EVERY 12 HOURS
Refills: 0 | Status: DISCONTINUED | OUTPATIENT
Start: 2020-01-17 | End: 2020-03-13

## 2020-01-17 RX ADMIN — Medication 3 MILLILITER(S): at 18:02

## 2020-01-17 RX ADMIN — OXYCODONE AND ACETAMINOPHEN 2 TABLET(S): 5; 325 TABLET ORAL at 21:55

## 2020-01-17 RX ADMIN — OXYCODONE AND ACETAMINOPHEN 2 TABLET(S): 5; 325 TABLET ORAL at 02:15

## 2020-01-17 RX ADMIN — Medication 1: at 12:41

## 2020-01-17 RX ADMIN — SERTRALINE 50 MILLIGRAM(S): 25 TABLET, FILM COATED ORAL at 21:54

## 2020-01-17 RX ADMIN — BUDESONIDE AND FORMOTEROL FUMARATE DIHYDRATE 2 PUFF(S): 160; 4.5 AEROSOL RESPIRATORY (INHALATION) at 05:43

## 2020-01-17 RX ADMIN — ATORVASTATIN CALCIUM 40 MILLIGRAM(S): 80 TABLET, FILM COATED ORAL at 21:54

## 2020-01-17 RX ADMIN — OXYCODONE AND ACETAMINOPHEN 2 TABLET(S): 5; 325 TABLET ORAL at 15:53

## 2020-01-17 RX ADMIN — OXYCODONE AND ACETAMINOPHEN 2 TABLET(S): 5; 325 TABLET ORAL at 16:23

## 2020-01-17 RX ADMIN — Medication 5 MILLIGRAM(S): at 21:54

## 2020-01-17 RX ADMIN — Medication 3 MILLILITER(S): at 12:43

## 2020-01-17 RX ADMIN — Medication 2: at 18:04

## 2020-01-17 RX ADMIN — OXYCODONE AND ACETAMINOPHEN 2 TABLET(S): 5; 325 TABLET ORAL at 08:50

## 2020-01-17 RX ADMIN — Medication 3 MILLILITER(S): at 01:02

## 2020-01-17 RX ADMIN — PANTOPRAZOLE SODIUM 40 MILLIGRAM(S): 20 TABLET, DELAYED RELEASE ORAL at 12:42

## 2020-01-17 RX ADMIN — POLYETHYLENE GLYCOL 3350 17 GRAM(S): 17 POWDER, FOR SOLUTION ORAL at 21:57

## 2020-01-17 RX ADMIN — Medication 650 MILLIGRAM(S): at 21:07

## 2020-01-17 RX ADMIN — RISPERIDONE 0.5 MILLIGRAM(S): 4 TABLET ORAL at 15:32

## 2020-01-17 RX ADMIN — OXYCODONE AND ACETAMINOPHEN 2 TABLET(S): 5; 325 TABLET ORAL at 03:00

## 2020-01-17 RX ADMIN — RISPERIDONE 0.5 MILLIGRAM(S): 4 TABLET ORAL at 21:54

## 2020-01-17 RX ADMIN — Medication 3 MILLILITER(S): at 05:43

## 2020-01-17 RX ADMIN — ENOXAPARIN SODIUM 40 MILLIGRAM(S): 100 INJECTION SUBCUTANEOUS at 12:40

## 2020-01-17 RX ADMIN — Medication 81 MILLIGRAM(S): at 12:40

## 2020-01-17 RX ADMIN — BUDESONIDE AND FORMOTEROL FUMARATE DIHYDRATE 2 PUFF(S): 160; 4.5 AEROSOL RESPIRATORY (INHALATION) at 18:02

## 2020-01-17 RX ADMIN — CHLORHEXIDINE GLUCONATE 1 APPLICATION(S): 213 SOLUTION TOPICAL at 08:39

## 2020-01-17 RX ADMIN — RISPERIDONE 0.5 MILLIGRAM(S): 4 TABLET ORAL at 05:43

## 2020-01-17 RX ADMIN — Medication 25 UNIT(S): at 18:04

## 2020-01-17 RX ADMIN — Medication 650 MILLIGRAM(S): at 20:37

## 2020-01-17 RX ADMIN — Medication 25 UNIT(S): at 08:37

## 2020-01-17 RX ADMIN — Medication 25 UNIT(S): at 12:41

## 2020-01-17 RX ADMIN — Medication 20 MILLIGRAM(S): at 05:43

## 2020-01-17 RX ADMIN — OXYCODONE AND ACETAMINOPHEN 2 TABLET(S): 5; 325 TABLET ORAL at 09:20

## 2020-01-17 RX ADMIN — OXYCODONE AND ACETAMINOPHEN 2 TABLET(S): 5; 325 TABLET ORAL at 22:25

## 2020-01-17 RX ADMIN — INSULIN GLARGINE 66 UNIT(S): 100 INJECTION, SOLUTION SUBCUTANEOUS at 22:10

## 2020-01-17 RX ADMIN — Medication 1: at 08:37

## 2020-01-17 NOTE — PROGRESS NOTE ADULT - SUBJECTIVE AND OBJECTIVE BOX
Chief complaint  Patient is a 59y old  Male who presents with a chief complaint of 59M w/ n/v/d and hyperglycemia sent in from [facility] (17 Jan 2020 11:22)   Review of systems  Patient in bed, looks comfortable, no fever, no hypoglycemia.    Labs and Fingersticks  CAPILLARY BLOOD GLUCOSE      POCT Blood Glucose.: 178 mg/dL (17 Jan 2020 08:09)  POCT Blood Glucose.: 185 mg/dL (16 Jan 2020 21:43)  POCT Blood Glucose.: 116 mg/dL (16 Jan 2020 17:20)      Anion Gap, Serum: 29 <H> (01-17 @ 06:03)      Calcium, Total Serum: <3.0 <LL> (01-17 @ 06:03)          01-17    131<L>  |  87<L>  |  17  ----------------------------<  172<H>  >9.0<HH>   |  15<L>  |  0.67    Ca    <3.0<LL>      17 Jan 2020 06:03  Mg     <.6     01-17                          11.7   11.04 )-----------( 216      ( 17 Jan 2020 09:46 )             36.2     Medications  MEDICATIONS  (STANDING):  albuterol/ipratropium for Nebulization 3 milliLiter(s) Nebulizer every 6 hours  aspirin enteric coated 81 milliGRAM(s) Oral daily  atorvastatin 40 milliGRAM(s) Oral at bedtime  budesonide 160 MICROgram(s)/formoterol 4.5 MICROgram(s) Inhaler 2 Puff(s) Inhalation two times a day  chlorhexidine 4% Liquid 1 Application(s) Topical <User Schedule>  dextrose 5%. 1000 milliLiter(s) (50 mL/Hr) IV Continuous <Continuous>  dextrose 50% Injectable 12.5 Gram(s) IV Push once  dextrose 50% Injectable 25 Gram(s) IV Push once  dextrose 50% Injectable 25 Gram(s) IV Push once  enoxaparin Injectable 40 milliGRAM(s) SubCutaneous daily  furosemide    Tablet 20 milliGRAM(s) Oral daily  insulin glargine Injectable (LANTUS) 66 Unit(s) SubCutaneous at bedtime  insulin lispro (HumaLOG) corrective regimen sliding scale   SubCutaneous three times a day before meals  insulin lispro (HumaLOG) corrective regimen sliding scale   SubCutaneous at bedtime  insulin lispro Injectable (HumaLOG) 25 Unit(s) SubCutaneous three times a day with meals  lisinopril 2.5 milliGRAM(s) Oral daily  melatonin 5 milliGRAM(s) Oral at bedtime  nicotine - Inhaler 1 Each Inhalation daily  pantoprazole    Tablet 40 milliGRAM(s) Oral daily  risperiDONE   Tablet 0.5 milliGRAM(s) Oral three times a day  sertraline 50 milliGRAM(s) Oral at bedtime      Physical Exam  General: Patient comfortable in bed  Vital Signs Last 12 Hrs  T(F): 97.8 (01-17-20 @ 08:53), Max: 98.5 (01-17-20 @ 05:30)  HR: 88 (01-17-20 @ 08:53) (73 - 88)  BP: 91/51 (01-17-20 @ 08:53) (91/51 - 100/64)  BP(mean): --  RR: 18 (01-17-20 @ 08:53) (18 - 19)  SpO2: 95% (01-17-20 @ 08:53) (94% - 95%)  Neck: No palpable thyroid nodules.  CVS: S1S2, No murmurs  Respiratory: No wheezing, no crepitations  GI: Abdomen soft, bowel sounds positive  Musculoskeletal:  edema lower extremities.   Skin: No skin rashes, no ecchymosis    Diagnostics Chief complaint  Patient is a 59y old  Male who presents with a chief complaint of 59M w/ n/v/d and hyperglycemia sent in from [facility] (17 Jan 2020 11:22)   Review of systems  Patient in bed, looks comfortable, no fever,  no hypoglycemia.    Labs and Fingersticks  CAPILLARY BLOOD GLUCOSE      POCT Blood Glucose.: 178 mg/dL (17 Jan 2020 08:09)  POCT Blood Glucose.: 185 mg/dL (16 Jan 2020 21:43)  POCT Blood Glucose.: 116 mg/dL (16 Jan 2020 17:20)      Anion Gap, Serum: 29 <H> (01-17 @ 06:03)      Calcium, Total Serum: <3.0 <LL> (01-17 @ 06:03)          01-17    131<L>  |  87<L>  |  17  ----------------------------<  172<H>  >9.0<HH>   |  15<L>  |  0.67    Ca    <3.0<LL>      17 Jan 2020 06:03  Mg     <.6     01-17                          11.7   11.04 )-----------( 216      ( 17 Jan 2020 09:46 )             36.2     Medications  MEDICATIONS  (STANDING):  albuterol/ipratropium for Nebulization 3 milliLiter(s) Nebulizer every 6 hours  aspirin enteric coated 81 milliGRAM(s) Oral daily  atorvastatin 40 milliGRAM(s) Oral at bedtime  budesonide 160 MICROgram(s)/formoterol 4.5 MICROgram(s) Inhaler 2 Puff(s) Inhalation two times a day  chlorhexidine 4% Liquid 1 Application(s) Topical <User Schedule>  dextrose 5%. 1000 milliLiter(s) (50 mL/Hr) IV Continuous <Continuous>  dextrose 50% Injectable 12.5 Gram(s) IV Push once  dextrose 50% Injectable 25 Gram(s) IV Push once  dextrose 50% Injectable 25 Gram(s) IV Push once  enoxaparin Injectable 40 milliGRAM(s) SubCutaneous daily  furosemide    Tablet 20 milliGRAM(s) Oral daily  insulin glargine Injectable (LANTUS) 66 Unit(s) SubCutaneous at bedtime  insulin lispro (HumaLOG) corrective regimen sliding scale   SubCutaneous three times a day before meals  insulin lispro (HumaLOG) corrective regimen sliding scale   SubCutaneous at bedtime  insulin lispro Injectable (HumaLOG) 25 Unit(s) SubCutaneous three times a day with meals  lisinopril 2.5 milliGRAM(s) Oral daily  melatonin 5 milliGRAM(s) Oral at bedtime  nicotine - Inhaler 1 Each Inhalation daily  pantoprazole    Tablet 40 milliGRAM(s) Oral daily  risperiDONE   Tablet 0.5 milliGRAM(s) Oral three times a day  sertraline 50 milliGRAM(s) Oral at bedtime      Physical Exam  General: Patient comfortable in bed  Vital Signs Last 12 Hrs  T(F): 97.8 (01-17-20 @ 08:53), Max: 98.5 (01-17-20 @ 05:30)  HR: 88 (01-17-20 @ 08:53) (73 - 88)  BP: 91/51 (01-17-20 @ 08:53) (91/51 - 100/64)  BP(mean): --  RR: 18 (01-17-20 @ 08:53) (18 - 19)  SpO2: 95% (01-17-20 @ 08:53) (94% - 95%)  Neck: No palpable thyroid nodules.  CVS: S1S2, No murmurs  Respiratory: No wheezing, no crepitations  GI: Abdomen soft, bowel sounds positive  Musculoskeletal:  edema lower extremities.   Skin: No skin rashes, no ecchymosis    Diagnostics

## 2020-01-17 NOTE — PROGRESS NOTE ADULT - SUBJECTIVE AND OBJECTIVE BOX
Patient is a 59y old  Male who presents with a chief complaint of 59M w/ n/v/d and hyperglycemia sent in from [facility] (17 Jan 2020 12:21)    pt. seen and examined , no c/o  INTERVAL HPI/OVERNIGHT EVENTS:  T(C): 36.8 (01-17-20 @ 20:09), Max: 36.9 (01-17-20 @ 05:30)  HR: 94 (01-17-20 @ 20:09) (73 - 94)  BP: 106/72 (01-17-20 @ 20:09) (91/51 - 109/71)  RR: 18 (01-17-20 @ 20:09) (18 - 19)  SpO2: 95% (01-17-20 @ 20:09) (94% - 95%)  Wt(kg): --  I&O's Summary    16 Jan 2020 07:01  -  17 Jan 2020 07:00  --------------------------------------------------------  IN: 1037 mL / OUT: 1400 mL / NET: -363 mL    17 Jan 2020 07:01  -  17 Jan 2020 21:14  --------------------------------------------------------  IN: 480 mL / OUT: 400 mL / NET: 80 mL        PAST MEDICAL & SURGICAL HISTORY:  Oxygen dependent  Gastroesophageal reflux disease, esophagitis presence not specified  Smoker  Bipolar 1 disorder  Coronary artery disease involving native coronary artery of native heart without angina pectoris  Type 2 diabetes mellitus without complication, with long-term current use of insulin  Pulmonary HTN  HLD (hyperlipidemia)  Stented coronary artery  COPD (chronic obstructive pulmonary disease)  No significant past surgical history      SOCIAL HISTORY  Alcohol:  Tobacco:  Illicit substance use:    FAMILY HISTORY:    REVIEW OF SYSTEMS:  CONSTITUTIONAL: No fever, weight loss, or fatigue  EYES: No eye pain, visual disturbances, or discharge  ENMT:  No difficulty hearing, tinnitus, vertigo; No sinus or throat pain  NECK: No pain or stiffness  RESPIRATORY: No cough, wheezing, chills or hemoptysis; No shortness of breath  CARDIOVASCULAR: No chest pain, palpitations, dizziness, or leg swelling  GASTROINTESTINAL: No abdominal or epigastric pain. No nausea, vomiting, or hematemesis; No diarrhea or constipation. No melena or hematochezia.  GENITOURINARY: No dysuria, frequency, hematuria, or incontinence  NEUROLOGICAL: No headaches, memory loss, loss of strength, numbness, or tremors  SKIN: No itching, burning, rashes, or lesions   LYMPH NODES: No enlarged glands  ENDOCRINE: No heat or cold intolerance; No hair loss  MUSCULOSKELETAL: No joint pain or swelling; No muscle, back, or extremity pain  PSYCHIATRIC: No depression, anxiety, mood swings, or difficulty sleeping  HEME/LYMPH: No easy bruising, or bleeding gums  ALLERY AND IMMUNOLOGIC: No hives or eczema    RADIOLOGY & ADDITIONAL TESTS:    Imaging Personally Reviewed:  [ ] YES  [ ] NO    Consultant(s) Notes Reviewed:  [ ] YES  [ ] NO    PHYSICAL EXAM:  GENERAL: NAD, well-groomed, well-developed  HEAD:  Atraumatic, Normocephalic  EYES: EOMI, PERRLA, conjunctiva and sclera clear  ENMT: No tonsillar erythema, exudates, or enlargement; Moist mucous membranes, Good dentition, No lesions  NECK: Supple, No JVD, Normal thyroid  NERVOUS SYSTEM:  Alert & Oriented X3, Good concentration; Motor Strength 5/5 B/L upper and lower extremities; DTRs 2+ intact and symmetric  CHEST/LUNG: Clear to percussion bilaterally; No rales, rhonchi, wheezing, or rubs  HEART: Regular rate and rhythm; No murmurs, rubs, or gallops  ABDOMEN: Soft, Nontender, Nondistended; Bowel sounds present  EXTREMITIES:  2+ Peripheral Pulses, No clubbing, cyanosis, or edema  LYMPH: No lymphadenopathy noted  SKIN: No rashes or lesions    LABS:                        11.7   11.04 )-----------( 216      ( 17 Jan 2020 09:46 )             36.2     01-17    136  |  95<L>  |  14  ----------------------------<  172<H>  4.5   |  29  |  0.67    Ca    9.5      17 Jan 2020 12:58  Phos  4.8     01-17  Mg     1.8     01-17          CAPILLARY BLOOD GLUCOSE      POCT Blood Glucose.: 216 mg/dL (17 Jan 2020 17:34)  POCT Blood Glucose.: 201 mg/dL (17 Jan 2020 16:06)  POCT Blood Glucose.: 167 mg/dL (17 Jan 2020 12:31)  POCT Blood Glucose.: 178 mg/dL (17 Jan 2020 08:09)  POCT Blood Glucose.: 185 mg/dL (16 Jan 2020 21:43)            MEDICATIONS  (STANDING):  albuterol/ipratropium for Nebulization 3 milliLiter(s) Nebulizer every 6 hours  aspirin enteric coated 81 milliGRAM(s) Oral daily  atorvastatin 40 milliGRAM(s) Oral at bedtime  budesonide 160 MICROgram(s)/formoterol 4.5 MICROgram(s) Inhaler 2 Puff(s) Inhalation two times a day  chlorhexidine 4% Liquid 1 Application(s) Topical <User Schedule>  dextrose 5%. 1000 milliLiter(s) (50 mL/Hr) IV Continuous <Continuous>  dextrose 50% Injectable 12.5 Gram(s) IV Push once  dextrose 50% Injectable 25 Gram(s) IV Push once  dextrose 50% Injectable 25 Gram(s) IV Push once  enoxaparin Injectable 40 milliGRAM(s) SubCutaneous daily  furosemide    Tablet 20 milliGRAM(s) Oral daily  insulin glargine Injectable (LANTUS) 66 Unit(s) SubCutaneous at bedtime  insulin lispro (HumaLOG) corrective regimen sliding scale   SubCutaneous three times a day before meals  insulin lispro (HumaLOG) corrective regimen sliding scale   SubCutaneous at bedtime  insulin lispro Injectable (HumaLOG) 25 Unit(s) SubCutaneous three times a day with meals  lisinopril 2.5 milliGRAM(s) Oral daily  melatonin 5 milliGRAM(s) Oral at bedtime  nicotine - Inhaler 1 Each Inhalation daily  pantoprazole    Tablet 40 milliGRAM(s) Oral daily  polyethylene glycol 3350 17 Gram(s) Oral every 12 hours  risperiDONE   Tablet 0.5 milliGRAM(s) Oral three times a day  sertraline 50 milliGRAM(s) Oral at bedtime    MEDICATIONS  (PRN):  acetaminophen   Tablet .. 650 milliGRAM(s) Oral every 6 hours PRN Mild Pain (1 - 3)  aluminum hydroxide/magnesium hydroxide/simethicone Suspension 30 milliLiter(s) Oral every 4 hours PRN Dyspepsia  dextrose 40% Gel 15 Gram(s) Oral once PRN Blood Glucose LESS THAN 70 milliGRAM(s)/deciliter  glucagon  Injectable 1 milliGRAM(s) IntraMuscular once PRN Glucose LESS THAN 70 milligrams/deciliter  oxycodone    5 mG/acetaminophen 325 mG 2 Tablet(s) Oral every 6 hours PRN Moderate Pain (4 - 6)      Care Discussed with Consultants/Other Providers [ ] YES  [ ] NO

## 2020-01-17 NOTE — PROGRESS NOTE ADULT - ASSESSMENT
59M w/ COPD on 2L NC, DM2 on insulin, CAD s/p stent, HFpEF, pulm HTN, HTN, charted hx of schizophrenia/bipolar disorder, panic disorder, and recent admit for COPD exacerbation d/c 12/12/19 presents from [facility] (SNF) for n/v/d and hyperglycemia. Admitted with diabetic ketoacidosis, found to have hyponatremia    A/P:    Hyponatremia:  Likely sec to hyperglycemia/dehydration  Also on SSRI  Optimize glucose control  Monitor Na level at present  Continue lasix at present in view of h/o CHF-Diastolic  Promote oral solute intake, avoid excessive free water intake     HTN:  Controlled   Monitor at present    Hyperkalemia  hemolyzed sample  Please repeat BMP stat

## 2020-01-17 NOTE — PROGRESS NOTE ADULT - SUBJECTIVE AND OBJECTIVE BOX
[facility]   MD JONAH SNOW MD RUORU WONG, PA    TEL:  OFFICE: [phone]  DR LUGO CELL: [phone]  PATRIZIA BRISCOE CELL: [phone]  DR. FINCH CELL: [phone]  DR. BARRIOS CELL: [phone]    FROM 5 PM - 7 AM PLEASE CALL ANSWERING SERVICE: [phone]    RENAL FOLLOW UP NOTE  --------------------------------------------------------------------------------  HPI:      Pt seen and examined at bedside.   Denies SOB, chest pain   very sleepy    PAST HISTORY  --------------------------------------------------------------------------------  No significant changes to PMH, PSH, FHx, SHx, unless otherwise noted    ALLERGIES & MEDICATIONS  --------------------------------------------------------------------------------  Allergies    No Known Allergies    Intolerances      Standing Inpatient Medications  albuterol/ipratropium for Nebulization 3 milliLiter(s) Nebulizer every 6 hours  aspirin enteric coated 81 milliGRAM(s) Oral daily  atorvastatin 40 milliGRAM(s) Oral at bedtime  budesonide 160 MICROgram(s)/formoterol 4.5 MICROgram(s) Inhaler 2 Puff(s) Inhalation two times a day  chlorhexidine 4% Liquid 1 Application(s) Topical <User Schedule>  dextrose 5%. 1000 milliLiter(s) IV Continuous <Continuous>  dextrose 50% Injectable 12.5 Gram(s) IV Push once  dextrose 50% Injectable 25 Gram(s) IV Push once  dextrose 50% Injectable 25 Gram(s) IV Push once  enoxaparin Injectable 40 milliGRAM(s) SubCutaneous daily  furosemide    Tablet 20 milliGRAM(s) Oral daily  insulin glargine Injectable (LANTUS) 66 Unit(s) SubCutaneous at bedtime  insulin lispro (HumaLOG) corrective regimen sliding scale   SubCutaneous three times a day before meals  insulin lispro (HumaLOG) corrective regimen sliding scale   SubCutaneous at bedtime  insulin lispro Injectable (HumaLOG) 25 Unit(s) SubCutaneous three times a day with meals  lisinopril 2.5 milliGRAM(s) Oral daily  melatonin 5 milliGRAM(s) Oral at bedtime  nicotine - Inhaler 1 Each Inhalation daily  pantoprazole    Tablet 40 milliGRAM(s) Oral daily  risperiDONE   Tablet 0.5 milliGRAM(s) Oral three times a day  sertraline 50 milliGRAM(s) Oral at bedtime    PRN Inpatient Medications  acetaminophen   Tablet .. 650 milliGRAM(s) Oral every 6 hours PRN  aluminum hydroxide/magnesium hydroxide/simethicone Suspension 30 milliLiter(s) Oral every 4 hours PRN  dextrose 40% Gel 15 Gram(s) Oral once PRN  glucagon  Injectable 1 milliGRAM(s) IntraMuscular once PRN  oxycodone    5 mG/acetaminophen 325 mG 2 Tablet(s) Oral every 6 hours PRN      REVIEW OF SYSTEMS  --------------------------------------------------------------------------------  General: no fever  CVS: no chest pain  RESP: no sob, no cough  ABD: no abdominal pain  : no dysuria,  MSK: no edema     VITALS/PHYSICAL EXAM  --------------------------------------------------------------------------------  T(C): 36.6 (01-17-20 @ 08:53), Max: 36.9 (01-17-20 @ 05:30)  HR: 88 (01-17-20 @ 08:53) (70 - 88)  BP: 91/51 (01-17-20 @ 08:53) (91/51 - 109/71)  RR: 18 (01-17-20 @ 08:53) (18 - 19)  SpO2: 95% (01-17-20 @ 08:53) (93% - 96%)  Wt(kg): --        01-16-20 @ 07:01  -  01-17-20 @ 07:00  --------------------------------------------------------  IN: 1037 mL / OUT: 1400 mL / NET: -363 mL    01-17-20 @ 07:01  -  01-17-20 @ 11:23  --------------------------------------------------------  IN: 240 mL / OUT: 400 mL / NET: -160 mL      Physical Exam:  	Gen: NAD  	HEENT: MMM  	Pulm: CTA B/L  	CV: S1S2  	Abd: Soft, +BS  	Ext: No LE edema B/L                      Neuro: Awake non focal  	Skin: Warm and Dry   	 no [place]    LABS/STUDIES  --------------------------------------------------------------------------------              11.7   11.04 >-----------<  216      [01-17-20 @ 09:46]              36.2     131  |  87  |  17  ----------------------------<  172      [01-17-20 @ 06:03]  >9.0   |  15  |  0.67        Ca     <3.0     [01-17-20 @ 06:03]      Mg     <.6     [01-17-20 @ 06:03]            Creatinine Trend:  SCr 0.67 [01-17 @ 06:03]  SCr 0.60 [01-14 @ 06:54]  SCr 0.61 [01-12 @ 06:21]  SCr 0.67 [01-05 @ 06:48]  SCr 0.61 [01-04 @ 03:47]    Urinalysis - [01-03-20 @ 22:18]      Color Light Yellow / Appearance Clear / SG 1.035 / pH 6.0      Gluc 1000 mg/dL / Ketone Small  / Bili Negative / Urobili Negative       Blood Small / Protein Negative / Leuk Est Negative / Nitrite Negative      RBC 11 / WBC 0 / Hyaline 0 / Gran  / Sq Epi  / Non Sq Epi 0 / Bacteria Negative      HbA1c 10.4      [12-04-19 @ 08:53]

## 2020-01-17 NOTE — PROGRESS NOTE ADULT - PROBLEM SELECTOR PLAN 1
Will continue current insulin regimen for now; Will continue monitoring FS and FU.  Suggest to DC to rehab on current insulin regimen and FU endo 2 weeks.  Patient counseled for compliance with consistent low carb diet, exercise as tolerated as out patient. Patient counseled for compliance with consistent low carb diet, exercise as tolerated as out patient.

## 2020-01-17 NOTE — PROGRESS NOTE ADULT - ASSESSMENT
Assessment  DMT2: 59y Male with DM T2 with hyperglycemia, now on basal bolus insulin, blood sugars improving, no hypoglycemic episodes. Patient is eating meals with good appetite, comfortable and alert, DC to rehab pending.  CAD: on medications, no chest pain, stable, monitored.  HTN: Controlled,  on antihypertensive medications.  Overweight/Obesity: No strict exercise routines, not on any weight loss program, neither on low  calorie diet.          Laury Romero MD  Cell: [phone]  Office: [phone] Assessment  DMT2: 59y Male with DM T2 with hyperglycemia, now on basal bolus insulin, blood sugars improving, no hypoglycemic episodes. Patient is eating meals with good appetite,  comfortable and alert, DC to rehab pending.  CAD: on medications, no chest pain, stable, monitored.  HTN: Controlled,  on antihypertensive medications.  Overweight/Obesity: No strict exercise routines, not on any weight loss program, neither on low  calorie diet.          Laury Romero MD  Cell: [phone]  Office: [phone]

## 2020-01-18 LAB
GLUCOSE BLDC GLUCOMTR-MCNC: 204 MG/DL — HIGH (ref 70–99)
GLUCOSE BLDC GLUCOMTR-MCNC: 210 MG/DL — HIGH (ref 70–99)
GLUCOSE BLDC GLUCOMTR-MCNC: 223 MG/DL — HIGH (ref 70–99)
GLUCOSE BLDC GLUCOMTR-MCNC: 293 MG/DL — HIGH (ref 70–99)

## 2020-01-18 RX ADMIN — BUDESONIDE AND FORMOTEROL FUMARATE DIHYDRATE 2 PUFF(S): 160; 4.5 AEROSOL RESPIRATORY (INHALATION) at 06:26

## 2020-01-18 RX ADMIN — OXYCODONE AND ACETAMINOPHEN 2 TABLET(S): 5; 325 TABLET ORAL at 13:15

## 2020-01-18 RX ADMIN — OXYCODONE AND ACETAMINOPHEN 2 TABLET(S): 5; 325 TABLET ORAL at 19:41

## 2020-01-18 RX ADMIN — SERTRALINE 50 MILLIGRAM(S): 25 TABLET, FILM COATED ORAL at 22:41

## 2020-01-18 RX ADMIN — Medication 25 UNIT(S): at 13:05

## 2020-01-18 RX ADMIN — Medication 81 MILLIGRAM(S): at 13:04

## 2020-01-18 RX ADMIN — RISPERIDONE 0.5 MILLIGRAM(S): 4 TABLET ORAL at 22:41

## 2020-01-18 RX ADMIN — OXYCODONE AND ACETAMINOPHEN 2 TABLET(S): 5; 325 TABLET ORAL at 20:11

## 2020-01-18 RX ADMIN — Medication 3 MILLILITER(S): at 13:04

## 2020-01-18 RX ADMIN — ATORVASTATIN CALCIUM 40 MILLIGRAM(S): 80 TABLET, FILM COATED ORAL at 22:41

## 2020-01-18 RX ADMIN — Medication 3: at 13:06

## 2020-01-18 RX ADMIN — POLYETHYLENE GLYCOL 3350 17 GRAM(S): 17 POWDER, FOR SOLUTION ORAL at 06:26

## 2020-01-18 RX ADMIN — ENOXAPARIN SODIUM 40 MILLIGRAM(S): 100 INJECTION SUBCUTANEOUS at 13:04

## 2020-01-18 RX ADMIN — RISPERIDONE 0.5 MILLIGRAM(S): 4 TABLET ORAL at 13:06

## 2020-01-18 RX ADMIN — OXYCODONE AND ACETAMINOPHEN 2 TABLET(S): 5; 325 TABLET ORAL at 13:45

## 2020-01-18 RX ADMIN — BUDESONIDE AND FORMOTEROL FUMARATE DIHYDRATE 2 PUFF(S): 160; 4.5 AEROSOL RESPIRATORY (INHALATION) at 18:22

## 2020-01-18 RX ADMIN — Medication 5 MILLIGRAM(S): at 22:46

## 2020-01-18 RX ADMIN — PANTOPRAZOLE SODIUM 40 MILLIGRAM(S): 20 TABLET, DELAYED RELEASE ORAL at 13:06

## 2020-01-18 RX ADMIN — Medication 3 MILLILITER(S): at 18:22

## 2020-01-18 RX ADMIN — Medication 2: at 09:10

## 2020-01-18 RX ADMIN — LISINOPRIL 2.5 MILLIGRAM(S): 2.5 TABLET ORAL at 06:25

## 2020-01-18 RX ADMIN — INSULIN GLARGINE 66 UNIT(S): 100 INJECTION, SOLUTION SUBCUTANEOUS at 22:41

## 2020-01-18 RX ADMIN — Medication 3 MILLILITER(S): at 06:26

## 2020-01-18 RX ADMIN — OXYCODONE AND ACETAMINOPHEN 2 TABLET(S): 5; 325 TABLET ORAL at 07:00

## 2020-01-18 RX ADMIN — Medication 2: at 18:22

## 2020-01-18 RX ADMIN — Medication 20 MILLIGRAM(S): at 06:26

## 2020-01-18 RX ADMIN — Medication 25 UNIT(S): at 09:10

## 2020-01-18 RX ADMIN — OXYCODONE AND ACETAMINOPHEN 2 TABLET(S): 5; 325 TABLET ORAL at 06:30

## 2020-01-18 RX ADMIN — Medication 25 UNIT(S): at 18:22

## 2020-01-18 RX ADMIN — RISPERIDONE 0.5 MILLIGRAM(S): 4 TABLET ORAL at 06:26

## 2020-01-18 NOTE — PROGRESS NOTE ADULT - SUBJECTIVE AND OBJECTIVE BOX
Patient is a 59y old  Male who presents with a chief complaint of 59M w/ n/v/d and hyperglycemia sent in from SNF (18 Jan 2020 16:08)    pt. seen and examined, no c/o   awaiting placement    INTERVAL HPI/OVERNIGHT EVENTS:  T(C): 36.3 (01-18-20 @ 20:00), Max: 36.3 (01-18-20 @ 06:15)  HR: 79 (01-18-20 @ 20:00) (74 - 84)  BP: 125/75 (01-18-20 @ 20:00) (100/66 - 125/75)  RR: 18 (01-18-20 @ 20:00) (18 - 18)  SpO2: 95% (01-18-20 @ 20:00) (92% - 98%)  Wt(kg): --  I&O's Summary    17 Jan 2020 07:01  -  18 Jan 2020 07:00  --------------------------------------------------------  IN: 480 mL / OUT: 400 mL / NET: 80 mL    18 Jan 2020 07:01  -  18 Jan 2020 20:10  --------------------------------------------------------  IN: 480 mL / OUT: 300 mL / NET: 180 mL        PAST MEDICAL & SURGICAL HISTORY:  Oxygen dependent  Gastroesophageal reflux disease, esophagitis presence not specified  Smoker  Bipolar 1 disorder  Coronary artery disease involving native coronary artery of native heart without angina pectoris  Type 2 diabetes mellitus without complication, with long-term current use of insulin  Pulmonary HTN  HLD (hyperlipidemia)  Stented coronary artery  COPD (chronic obstructive pulmonary disease)  No significant past surgical history      SOCIAL HISTORY  Alcohol:  Tobacco:  Illicit substance use:    FAMILY HISTORY:    REVIEW OF SYSTEMS:  CONSTITUTIONAL: No fever, weight loss, or fatigue  EYES: No eye pain, visual disturbances, or discharge  ENMT:  No difficulty hearing, tinnitus, vertigo; No sinus or throat pain  NECK: No pain or stiffness  RESPIRATORY: No cough, wheezing, chills or hemoptysis; No shortness of breath  CARDIOVASCULAR: No chest pain, palpitations, dizziness, or leg swelling  GASTROINTESTINAL: No abdominal or epigastric pain. No nausea, vomiting, or hematemesis; No diarrhea or constipation. No melena or hematochezia.  GENITOURINARY: No dysuria, frequency, hematuria, or incontinence  NEUROLOGICAL: No headaches, memory loss, loss of strength, numbness, or tremors  SKIN: No itching, burning, rashes, or lesions   LYMPH NODES: No enlarged glands  ENDOCRINE: No heat or cold intolerance; No hair loss  MUSCULOSKELETAL: No joint pain or swelling; No muscle, back, or extremity pain  PSYCHIATRIC: No depression, anxiety, mood swings, or difficulty sleeping  HEME/LYMPH: No easy bruising, or bleeding gums  ALLERY AND IMMUNOLOGIC: No hives or eczema    RADIOLOGY & ADDITIONAL TESTS:    Imaging Personally Reviewed:  [ ] YES  [ ] NO    Consultant(s) Notes Reviewed:  [ ] YES  [ ] NO    PHYSICAL EXAM:  GENERAL: NAD, well-groomed, well-developed  HEAD:  Atraumatic, Normocephalic  EYES: EOMI, PERRLA, conjunctiva and sclera clear  ENMT: No tonsillar erythema, exudates, or enlargement; Moist mucous membranes, Good dentition, No lesions  NECK: Supple, No JVD, Normal thyroid  NERVOUS SYSTEM:  Alert & Oriented X3, Good concentration; Motor Strength 5/5 B/L upper and lower extremities; DTRs 2+ intact and symmetric  CHEST/LUNG: Clear to percussion bilaterally; No rales, rhonchi, wheezing, or rubs  HEART: Regular rate and rhythm; No murmurs, rubs, or gallops  ABDOMEN: Soft, Nontender, Nondistended; Bowel sounds present  EXTREMITIES:  2+ Peripheral Pulses, No clubbing, cyanosis, or edema  LYMPH: No lymphadenopathy noted  SKIN: No rashes or lesions    LABS:                        11.7   11.04 )-----------( 216      ( 17 Jan 2020 09:46 )             36.2     01-17    136  |  95<L>  |  14  ----------------------------<  172<H>  4.5   |  29  |  0.67    Ca    9.5      17 Jan 2020 12:58  Phos  4.8     01-17  Mg     1.8     01-17          CAPILLARY BLOOD GLUCOSE      POCT Blood Glucose.: 223 mg/dL (18 Jan 2020 17:38)  POCT Blood Glucose.: 293 mg/dL (18 Jan 2020 12:21)  POCT Blood Glucose.: 204 mg/dL (18 Jan 2020 08:29)  POCT Blood Glucose.: 198 mg/dL (17 Jan 2020 22:05)            MEDICATIONS  (STANDING):  albuterol/ipratropium for Nebulization 3 milliLiter(s) Nebulizer every 6 hours  aspirin enteric coated 81 milliGRAM(s) Oral daily  atorvastatin 40 milliGRAM(s) Oral at bedtime  budesonide 160 MICROgram(s)/formoterol 4.5 MICROgram(s) Inhaler 2 Puff(s) Inhalation two times a day  dextrose 5%. 1000 milliLiter(s) (50 mL/Hr) IV Continuous <Continuous>  dextrose 50% Injectable 12.5 Gram(s) IV Push once  dextrose 50% Injectable 25 Gram(s) IV Push once  dextrose 50% Injectable 25 Gram(s) IV Push once  enoxaparin Injectable 40 milliGRAM(s) SubCutaneous daily  furosemide    Tablet 20 milliGRAM(s) Oral daily  insulin glargine Injectable (LANTUS) 66 Unit(s) SubCutaneous at bedtime  insulin lispro (HumaLOG) corrective regimen sliding scale   SubCutaneous three times a day before meals  insulin lispro (HumaLOG) corrective regimen sliding scale   SubCutaneous at bedtime  insulin lispro Injectable (HumaLOG) 25 Unit(s) SubCutaneous three times a day with meals  lisinopril 2.5 milliGRAM(s) Oral daily  melatonin 5 milliGRAM(s) Oral at bedtime  nicotine - Inhaler 1 Each Inhalation daily  pantoprazole    Tablet 40 milliGRAM(s) Oral daily  polyethylene glycol 3350 17 Gram(s) Oral every 12 hours  risperiDONE   Tablet 0.5 milliGRAM(s) Oral three times a day  sertraline 50 milliGRAM(s) Oral at bedtime    MEDICATIONS  (PRN):  acetaminophen   Tablet .. 650 milliGRAM(s) Oral every 6 hours PRN Mild Pain (1 - 3)  aluminum hydroxide/magnesium hydroxide/simethicone Suspension 30 milliLiter(s) Oral every 4 hours PRN Dyspepsia  dextrose 40% Gel 15 Gram(s) Oral once PRN Blood Glucose LESS THAN 70 milliGRAM(s)/deciliter  glucagon  Injectable 1 milliGRAM(s) IntraMuscular once PRN Glucose LESS THAN 70 milligrams/deciliter  oxycodone    5 mG/acetaminophen 325 mG 2 Tablet(s) Oral every 6 hours PRN Moderate Pain (4 - 6)      Care Discussed with Consultants/Other Providers [ ] YES  [ ] NO

## 2020-01-18 NOTE — PROGRESS NOTE ADULT - SUBJECTIVE AND OBJECTIVE BOX
[facility]   MD JONAH SNOW MD RUORU WONG, PA    TEL:  OFFICE: [phone]  DR LUGO CELL: [phone]  PATRIZIA BRISCOE CELL: [phone]  DR. FINCH CELL: [phone]  DR. BARRIOS CELL: [phone]    FROM 5 PM - 7 AM PLEASE CALL ANSWERING SERVICE: [phone]    RENAL FOLLOW UP NOTE  --------------------------------------------------------------------------------  HPI:      Pt seen and examined at bedside.   Denies SOB, chest pain     PAST HISTORY  --------------------------------------------------------------------------------  No significant changes to PMH, PSH, FHx, SHx, unless otherwise noted    ALLERGIES & MEDICATIONS  --------------------------------------------------------------------------------  Allergies    No Known Allergies    Intolerances      Standing Inpatient Medications  albuterol/ipratropium for Nebulization 3 milliLiter(s) Nebulizer every 6 hours  aspirin enteric coated 81 milliGRAM(s) Oral daily  atorvastatin 40 milliGRAM(s) Oral at bedtime  budesonide 160 MICROgram(s)/formoterol 4.5 MICROgram(s) Inhaler 2 Puff(s) Inhalation two times a day  dextrose 5%. 1000 milliLiter(s) IV Continuous <Continuous>  dextrose 50% Injectable 12.5 Gram(s) IV Push once  dextrose 50% Injectable 25 Gram(s) IV Push once  dextrose 50% Injectable 25 Gram(s) IV Push once  enoxaparin Injectable 40 milliGRAM(s) SubCutaneous daily  furosemide    Tablet 20 milliGRAM(s) Oral daily  insulin glargine Injectable (LANTUS) 66 Unit(s) SubCutaneous at bedtime  insulin lispro (HumaLOG) corrective regimen sliding scale   SubCutaneous three times a day before meals  insulin lispro (HumaLOG) corrective regimen sliding scale   SubCutaneous at bedtime  insulin lispro Injectable (HumaLOG) 25 Unit(s) SubCutaneous three times a day with meals  lisinopril 2.5 milliGRAM(s) Oral daily  melatonin 5 milliGRAM(s) Oral at bedtime  nicotine - Inhaler 1 Each Inhalation daily  pantoprazole    Tablet 40 milliGRAM(s) Oral daily  polyethylene glycol 3350 17 Gram(s) Oral every 12 hours  risperiDONE   Tablet 0.5 milliGRAM(s) Oral three times a day  sertraline 50 milliGRAM(s) Oral at bedtime    PRN Inpatient Medications  acetaminophen   Tablet .. 650 milliGRAM(s) Oral every 6 hours PRN  aluminum hydroxide/magnesium hydroxide/simethicone Suspension 30 milliLiter(s) Oral every 4 hours PRN  dextrose 40% Gel 15 Gram(s) Oral once PRN  glucagon  Injectable 1 milliGRAM(s) IntraMuscular once PRN  oxycodone    5 mG/acetaminophen 325 mG 2 Tablet(s) Oral every 6 hours PRN      REVIEW OF SYSTEMS  --------------------------------------------------------------------------------  General: no fever  CVS: no chest pain  RESP: no sob, no cough  ABD: no abdominal pain  : no dysuria,  MSK: no edema     VITALS/PHYSICAL EXAM  --------------------------------------------------------------------------------  T(C): 36.3 (01-18-20 @ 06:15), Max: 36.8 (01-17-20 @ 20:09)  HR: 84 (01-18-20 @ 06:15) (84 - 94)  BP: 100/66 (01-18-20 @ 06:15) (91/51 - 106/72)  RR: 18 (01-18-20 @ 06:15) (18 - 18)  SpO2: 98% (01-18-20 @ 06:15) (95% - 98%)  Wt(kg): --        01-17-20 @ 07:01  -  01-18-20 @ 07:00  --------------------------------------------------------  IN: 480 mL / OUT: 400 mL / NET: 80 mL      Physical Exam:  	Gen: NAD  	HEENT: MMM  	Pulm: CTA B/L  	CV: S1S2  	Abd: Soft, +BS  	Ext: No LE edema B/L                      Neuro: Awake non focal  	Skin: Warm and Dry   	 no [place]    LABS/STUDIES  --------------------------------------------------------------------------------              11.7   11.04 >-----------<  216      [01-17-20 @ 09:46]              36.2     136  |  95  |  14  ----------------------------<  172      [01-17-20 @ 12:58]  4.5   |  29  |  0.67        Ca     9.5     [01-17-20 @ 12:58]      Mg     1.8     [01-17-20 @ 12:58]      Phos  4.8     [01-17-20 @ 12:58]            Creatinine Trend:  SCr 0.67 [01-17 @ 12:58]  SCr 0.67 [01-17 @ 06:03]  SCr 0.60 [01-14 @ 06:54]  SCr 0.61 [01-12 @ 06:21]  SCr 0.67 [01-05 @ 06:48]    Urinalysis - [01-03-20 @ 22:18]      Color Light Yellow / Appearance Clear / SG 1.035 / pH 6.0      Gluc 1000 mg/dL / Ketone Small  / Bili Negative / Urobili Negative       Blood Small / Protein Negative / Leuk Est Negative / Nitrite Negative      RBC 11 / WBC 0 / Hyaline 0 / Gran  / Sq Epi  / Non Sq Epi 0 / Bacteria Negative      HbA1c 10.4      [12-04-19 @ 08:53]

## 2020-01-18 NOTE — PROGRESS NOTE ADULT - ASSESSMENT
Assessment  DMT2: 59y Male with DM T2 with hyperglycemia, now on basal bolus insulin, blood sugars trending up again, no hypoglycemic episodes. Patient is eating meals with good appetite,  comfortable and alert, DC to rehab pending.  CAD: on medications, no chest pain, stable, monitored.  HTN: Controlled,  on antihypertensive medications.  Overweight/Obesity: No strict exercise routines, not on any weight loss program, neither on low  calorie diet.          Laury Romero MD  Cell: [phone]  Office: [phone]

## 2020-01-18 NOTE — PROGRESS NOTE ADULT - ASSESSMENT
59M w/ COPD on 2L NC, DM2 on insulin, CAD s/p stent, HFpEF, pulm HTN, HTN, charted hx of schizophrenia/bipolar disorder, panic disorder, and recent admit for COPD exacerbation d/c 12/12/19 presents from [facility] ([facility]) for n/v/d and hyperglycemia. Admitted with diabetic ketoacidosis, found to have hyponatremia    A/P:    Hyponatremia:  Likely sec to hyperglycemia/dehydration  Also on SSRI  Optimize glucose control  Monitor Na level at present  Continue lasix at present in view of h/o CHF-Diastolic  Promote oral solute intake, avoid excessive free water intake     HTN:  Controlled   Monitor at present    Hyperkalemia  hemolyzed sample  Repeat serum K WNL

## 2020-01-18 NOTE — PROGRESS NOTE ADULT - SUBJECTIVE AND OBJECTIVE BOX
Chief complaint  Patient is a 59y old  Male who presents with a chief complaint of 59M w/ n/v/d and hyperglycemia sent in from [facility] (18 Jan 2020 08:17)   Review of systems  Patient in bed, looks comfortable, no fever, no hypoglycemia.    Labs and Fingersticks  CAPILLARY BLOOD GLUCOSE      POCT Blood Glucose.: 293 mg/dL (18 Jan 2020 12:21)  POCT Blood Glucose.: 204 mg/dL (18 Jan 2020 08:29)  POCT Blood Glucose.: 198 mg/dL (17 Jan 2020 22:05)  POCT Blood Glucose.: 216 mg/dL (17 Jan 2020 17:34)      Anion Gap, Serum: 12 (01-17 @ 12:58)  Anion Gap, Serum: 29 <H> (01-17 @ 06:03)      Calcium, Total Serum: 9.5 (01-17 @ 12:58)  Calcium, Total Serum: <3.0 <LL> (01-17 @ 06:03)          01-17    136  |  95<L>  |  14  ----------------------------<  172<H>  4.5   |  29  |  0.67    Ca    9.5      17 Jan 2020 12:58  Phos  4.8     01-17  Mg     1.8     01-17                          11.7   11.04 )-----------( 216      ( 17 Jan 2020 09:46 )             36.2     Medications  MEDICATIONS  (STANDING):  albuterol/ipratropium for Nebulization 3 milliLiter(s) Nebulizer every 6 hours  aspirin enteric coated 81 milliGRAM(s) Oral daily  atorvastatin 40 milliGRAM(s) Oral at bedtime  budesonide 160 MICROgram(s)/formoterol 4.5 MICROgram(s) Inhaler 2 Puff(s) Inhalation two times a day  dextrose 5%. 1000 milliLiter(s) (50 mL/Hr) IV Continuous <Continuous>  dextrose 50% Injectable 12.5 Gram(s) IV Push once  dextrose 50% Injectable 25 Gram(s) IV Push once  dextrose 50% Injectable 25 Gram(s) IV Push once  enoxaparin Injectable 40 milliGRAM(s) SubCutaneous daily  furosemide    Tablet 20 milliGRAM(s) Oral daily  insulin glargine Injectable (LANTUS) 66 Unit(s) SubCutaneous at bedtime  insulin lispro (HumaLOG) corrective regimen sliding scale   SubCutaneous three times a day before meals  insulin lispro (HumaLOG) corrective regimen sliding scale   SubCutaneous at bedtime  insulin lispro Injectable (HumaLOG) 25 Unit(s) SubCutaneous three times a day with meals  lisinopril 2.5 milliGRAM(s) Oral daily  melatonin 5 milliGRAM(s) Oral at bedtime  nicotine - Inhaler 1 Each Inhalation daily  pantoprazole    Tablet 40 milliGRAM(s) Oral daily  polyethylene glycol 3350 17 Gram(s) Oral every 12 hours  risperiDONE   Tablet 0.5 milliGRAM(s) Oral three times a day  sertraline 50 milliGRAM(s) Oral at bedtime      Physical Exam  General: Patient comfortable in bed  Vital Signs Last 12 Hrs  T(F): 97.4 (01-18-20 @ 13:10), Max: 97.4 (01-18-20 @ 13:10)  HR: 74 (01-18-20 @ 13:10) (74 - 84)  BP: 111/71 (01-18-20 @ 13:10) (100/66 - 111/71)  BP(mean): --  RR: 18 (01-18-20 @ 13:10) (18 - 18)  SpO2: 92% (01-18-20 @ 13:10) (92% - 98%)  Neck: No palpable thyroid nodules.  CVS: S1S2, No murmurs  Respiratory: No wheezing, no crepitations  GI: Abdomen soft, bowel sounds positive  Musculoskeletal:  edema lower extremities.   Skin: No skin rashes, no ecchymosis    Diagnostics

## 2020-01-19 LAB
ANION GAP SERPL CALC-SCNC: 12 MMOL/L — SIGNIFICANT CHANGE UP (ref 5–17)
BUN SERPL-MCNC: 14 MG/DL — SIGNIFICANT CHANGE UP (ref 7–23)
CALCIUM SERPL-MCNC: 9.4 MG/DL — SIGNIFICANT CHANGE UP (ref 8.4–10.5)
CHLORIDE SERPL-SCNC: 96 MMOL/L — SIGNIFICANT CHANGE UP (ref 96–108)
CO2 SERPL-SCNC: 29 MMOL/L — SIGNIFICANT CHANGE UP (ref 22–31)
CREAT SERPL-MCNC: 0.66 MG/DL — SIGNIFICANT CHANGE UP (ref 0.5–1.3)
GLUCOSE BLDC GLUCOMTR-MCNC: 128 MG/DL — HIGH (ref 70–99)
GLUCOSE BLDC GLUCOMTR-MCNC: 166 MG/DL — HIGH (ref 70–99)
GLUCOSE BLDC GLUCOMTR-MCNC: 202 MG/DL — HIGH (ref 70–99)
GLUCOSE BLDC GLUCOMTR-MCNC: 236 MG/DL — HIGH (ref 70–99)
GLUCOSE SERPL-MCNC: 220 MG/DL — HIGH (ref 70–99)
HCT VFR BLD CALC: 38 % — LOW (ref 39–50)
HGB BLD-MCNC: 12 G/DL — LOW (ref 13–17)
MCHC RBC-ENTMCNC: 28.8 PG — SIGNIFICANT CHANGE UP (ref 27–34)
MCHC RBC-ENTMCNC: 31.6 GM/DL — LOW (ref 32–36)
MCV RBC AUTO: 91.1 FL — SIGNIFICANT CHANGE UP (ref 80–100)
PLATELET # BLD AUTO: 207 K/UL — SIGNIFICANT CHANGE UP (ref 150–400)
POTASSIUM SERPL-MCNC: 4.6 MMOL/L — SIGNIFICANT CHANGE UP (ref 3.5–5.3)
POTASSIUM SERPL-SCNC: 4.6 MMOL/L — SIGNIFICANT CHANGE UP (ref 3.5–5.3)
RBC # BLD: 4.17 M/UL — LOW (ref 4.2–5.8)
RBC # FLD: 12.7 % — SIGNIFICANT CHANGE UP (ref 10.3–14.5)
SODIUM SERPL-SCNC: 137 MMOL/L — SIGNIFICANT CHANGE UP (ref 135–145)
WBC # BLD: 9.36 K/UL — SIGNIFICANT CHANGE UP (ref 3.8–10.5)
WBC # FLD AUTO: 9.36 K/UL — SIGNIFICANT CHANGE UP (ref 3.8–10.5)

## 2020-01-19 RX ORDER — SENNA PLUS 8.6 MG/1
2 TABLET ORAL AT BEDTIME
Refills: 0 | Status: DISCONTINUED | OUTPATIENT
Start: 2020-01-19 | End: 2020-03-13

## 2020-01-19 RX ORDER — INSULIN LISPRO 100/ML
27 VIAL (ML) SUBCUTANEOUS
Refills: 0 | Status: DISCONTINUED | OUTPATIENT
Start: 2020-01-19 | End: 2020-01-25

## 2020-01-19 RX ORDER — OXYCODONE AND ACETAMINOPHEN 5; 325 MG/1; MG/1
2 TABLET ORAL EVERY 6 HOURS
Refills: 0 | Status: DISCONTINUED | OUTPATIENT
Start: 2020-01-19 | End: 2020-01-26

## 2020-01-19 RX ORDER — INSULIN GLARGINE 100 [IU]/ML
70 INJECTION, SOLUTION SUBCUTANEOUS AT BEDTIME
Refills: 0 | Status: DISCONTINUED | OUTPATIENT
Start: 2020-01-19 | End: 2020-01-25

## 2020-01-19 RX ADMIN — Medication 3 MILLILITER(S): at 18:30

## 2020-01-19 RX ADMIN — LISINOPRIL 2.5 MILLIGRAM(S): 2.5 TABLET ORAL at 06:07

## 2020-01-19 RX ADMIN — Medication 3 MILLILITER(S): at 06:07

## 2020-01-19 RX ADMIN — RISPERIDONE 0.5 MILLIGRAM(S): 4 TABLET ORAL at 22:36

## 2020-01-19 RX ADMIN — Medication 25 UNIT(S): at 08:58

## 2020-01-19 RX ADMIN — SERTRALINE 50 MILLIGRAM(S): 25 TABLET, FILM COATED ORAL at 22:35

## 2020-01-19 RX ADMIN — Medication 20 MILLIGRAM(S): at 06:08

## 2020-01-19 RX ADMIN — OXYCODONE AND ACETAMINOPHEN 2 TABLET(S): 5; 325 TABLET ORAL at 10:00

## 2020-01-19 RX ADMIN — OXYCODONE AND ACETAMINOPHEN 2 TABLET(S): 5; 325 TABLET ORAL at 22:34

## 2020-01-19 RX ADMIN — POLYETHYLENE GLYCOL 3350 17 GRAM(S): 17 POWDER, FOR SOLUTION ORAL at 06:08

## 2020-01-19 RX ADMIN — OXYCODONE AND ACETAMINOPHEN 2 TABLET(S): 5; 325 TABLET ORAL at 15:15

## 2020-01-19 RX ADMIN — SENNA PLUS 2 TABLET(S): 8.6 TABLET ORAL at 22:35

## 2020-01-19 RX ADMIN — ENOXAPARIN SODIUM 40 MILLIGRAM(S): 100 INJECTION SUBCUTANEOUS at 13:12

## 2020-01-19 RX ADMIN — ATORVASTATIN CALCIUM 40 MILLIGRAM(S): 80 TABLET, FILM COATED ORAL at 22:36

## 2020-01-19 RX ADMIN — Medication 27 UNIT(S): at 18:29

## 2020-01-19 RX ADMIN — RISPERIDONE 0.5 MILLIGRAM(S): 4 TABLET ORAL at 06:08

## 2020-01-19 RX ADMIN — Medication 5 MILLIGRAM(S): at 22:35

## 2020-01-19 RX ADMIN — POLYETHYLENE GLYCOL 3350 17 GRAM(S): 17 POWDER, FOR SOLUTION ORAL at 18:30

## 2020-01-19 RX ADMIN — OXYCODONE AND ACETAMINOPHEN 2 TABLET(S): 5; 325 TABLET ORAL at 09:04

## 2020-01-19 RX ADMIN — OXYCODONE AND ACETAMINOPHEN 2 TABLET(S): 5; 325 TABLET ORAL at 02:21

## 2020-01-19 RX ADMIN — Medication 81 MILLIGRAM(S): at 13:11

## 2020-01-19 RX ADMIN — PANTOPRAZOLE SODIUM 40 MILLIGRAM(S): 20 TABLET, DELAYED RELEASE ORAL at 13:13

## 2020-01-19 RX ADMIN — BUDESONIDE AND FORMOTEROL FUMARATE DIHYDRATE 2 PUFF(S): 160; 4.5 AEROSOL RESPIRATORY (INHALATION) at 18:33

## 2020-01-19 RX ADMIN — Medication 2: at 08:58

## 2020-01-19 RX ADMIN — OXYCODONE AND ACETAMINOPHEN 2 TABLET(S): 5; 325 TABLET ORAL at 02:51

## 2020-01-19 RX ADMIN — OXYCODONE AND ACETAMINOPHEN 2 TABLET(S): 5; 325 TABLET ORAL at 16:21

## 2020-01-19 RX ADMIN — Medication 27 UNIT(S): at 13:10

## 2020-01-19 RX ADMIN — Medication 3 MILLILITER(S): at 13:09

## 2020-01-19 RX ADMIN — Medication 2: at 13:09

## 2020-01-19 RX ADMIN — Medication 5 MILLIGRAM(S): at 16:22

## 2020-01-19 RX ADMIN — BUDESONIDE AND FORMOTEROL FUMARATE DIHYDRATE 2 PUFF(S): 160; 4.5 AEROSOL RESPIRATORY (INHALATION) at 06:07

## 2020-01-19 RX ADMIN — INSULIN GLARGINE 70 UNIT(S): 100 INJECTION, SOLUTION SUBCUTANEOUS at 22:36

## 2020-01-19 RX ADMIN — RISPERIDONE 0.5 MILLIGRAM(S): 4 TABLET ORAL at 13:11

## 2020-01-19 RX ADMIN — OXYCODONE AND ACETAMINOPHEN 2 TABLET(S): 5; 325 TABLET ORAL at 23:04

## 2020-01-19 RX ADMIN — Medication 1: at 18:28

## 2020-01-19 NOTE — PROGRESS NOTE ADULT - SUBJECTIVE AND OBJECTIVE BOX
Patient is a 59y old  Male who presents with a chief complaint of 59M w/ n/v/d and hyperglycemia sent in from SNF (19 Jan 2020 14:11)    pt. seen and examined, no c/o    INTERVAL HPI/OVERNIGHT EVENTS:  T(C): 36.9 (01-19-20 @ 08:00), Max: 36.9 (01-19-20 @ 08:00)  HR: 73 (01-19-20 @ 08:00) (73 - 88)  BP: 119/72 (01-19-20 @ 08:00) (107/69 - 119/72)  RR: 18 (01-19-20 @ 08:00) (18 - 18)  SpO2: 97% (01-19-20 @ 08:00) (96% - 97%)  Wt(kg): --  I&O's Summary    18 Jan 2020 07:01  -  19 Jan 2020 07:00  --------------------------------------------------------  IN: 480 mL / OUT: 800 mL / NET: -320 mL        PAST MEDICAL & SURGICAL HISTORY:  Oxygen dependent  Gastroesophageal reflux disease, esophagitis presence not specified  Smoker  Bipolar 1 disorder  Coronary artery disease involving native coronary artery of native heart without angina pectoris  Type 2 diabetes mellitus without complication, with long-term current use of insulin  Pulmonary HTN  HLD (hyperlipidemia)  Stented coronary artery  COPD (chronic obstructive pulmonary disease)  No significant past surgical history      SOCIAL HISTORY  Alcohol:  Tobacco:  Illicit substance use:    FAMILY HISTORY:    REVIEW OF SYSTEMS:  CONSTITUTIONAL: No fever, weight loss, or fatigue  EYES: No eye pain, visual disturbances, or discharge  ENMT:  No difficulty hearing, tinnitus, vertigo; No sinus or throat pain  NECK: No pain or stiffness  RESPIRATORY: No cough, wheezing, chills or hemoptysis; No shortness of breath  CARDIOVASCULAR: No chest pain, palpitations, dizziness, or leg swelling  GASTROINTESTINAL: No abdominal or epigastric pain. No nausea, vomiting, or hematemesis; No diarrhea or constipation. No melena or hematochezia.  GENITOURINARY: No dysuria, frequency, hematuria, or incontinence  NEUROLOGICAL: No headaches, memory loss, loss of strength, numbness, or tremors  SKIN: No itching, burning, rashes, or lesions   LYMPH NODES: No enlarged glands  ENDOCRINE: No heat or cold intolerance; No hair loss  MUSCULOSKELETAL: No joint pain or swelling; No muscle, back, or extremity pain  PSYCHIATRIC: No depression, anxiety, mood swings, or difficulty sleeping  HEME/LYMPH: No easy bruising, or bleeding gums  ALLERY AND IMMUNOLOGIC: No hives or eczema    RADIOLOGY & ADDITIONAL TESTS:    Imaging Personally Reviewed:  [ ] YES  [ ] NO    Consultant(s) Notes Reviewed:  [ ] YES  [ ] NO    PHYSICAL EXAM:  GENERAL: NAD, well-groomed, well-developed  HEAD:  Atraumatic, Normocephalic  EYES: EOMI, PERRLA, conjunctiva and sclera clear  ENMT: No tonsillar erythema, exudates, or enlargement; Moist mucous membranes, Good dentition, No lesions  NECK: Supple, No JVD, Normal thyroid  NERVOUS SYSTEM:  Alert & Oriented X3, Good concentration; Motor Strength 5/5 B/L upper and lower extremities; DTRs 2+ intact and symmetric  CHEST/LUNG: Clear to percussion bilaterally; No rales, rhonchi, wheezing, or rubs  HEART: Regular rate and rhythm; No murmurs, rubs, or gallops  ABDOMEN: Soft, Nontender, Nondistended; Bowel sounds present  EXTREMITIES:  2+ Peripheral Pulses, No clubbing, cyanosis, or edema  LYMPH: No lymphadenopathy noted  SKIN: No rashes or lesions    LABS:                        12.0   9.36  )-----------( 207      ( 19 Jan 2020 09:32 )             38.0     01-19    137  |  96  |  14  ----------------------------<  220<H>  4.6   |  29  |  0.66    Ca    9.4      19 Jan 2020 07:12          CAPILLARY BLOOD GLUCOSE      POCT Blood Glucose.: 166 mg/dL (19 Jan 2020 17:37)  POCT Blood Glucose.: 202 mg/dL (19 Jan 2020 12:37)  POCT Blood Glucose.: 236 mg/dL (19 Jan 2020 08:23)  POCT Blood Glucose.: 210 mg/dL (18 Jan 2020 22:29)            MEDICATIONS  (STANDING):  albuterol/ipratropium for Nebulization 3 milliLiter(s) Nebulizer every 6 hours  aspirin enteric coated 81 milliGRAM(s) Oral daily  atorvastatin 40 milliGRAM(s) Oral at bedtime  budesonide 160 MICROgram(s)/formoterol 4.5 MICROgram(s) Inhaler 2 Puff(s) Inhalation two times a day  dextrose 5%. 1000 milliLiter(s) (50 mL/Hr) IV Continuous <Continuous>  dextrose 50% Injectable 12.5 Gram(s) IV Push once  dextrose 50% Injectable 25 Gram(s) IV Push once  dextrose 50% Injectable 25 Gram(s) IV Push once  enoxaparin Injectable 40 milliGRAM(s) SubCutaneous daily  furosemide    Tablet 20 milliGRAM(s) Oral daily  insulin glargine Injectable (LANTUS) 70 Unit(s) SubCutaneous at bedtime  insulin lispro (HumaLOG) corrective regimen sliding scale   SubCutaneous three times a day before meals  insulin lispro (HumaLOG) corrective regimen sliding scale   SubCutaneous at bedtime  insulin lispro Injectable (HumaLOG) 27 Unit(s) SubCutaneous three times a day with meals  lisinopril 2.5 milliGRAM(s) Oral daily  melatonin 5 milliGRAM(s) Oral at bedtime  nicotine - Inhaler 1 Each Inhalation daily  pantoprazole    Tablet 40 milliGRAM(s) Oral daily  polyethylene glycol 3350 17 Gram(s) Oral every 12 hours  risperiDONE   Tablet 0.5 milliGRAM(s) Oral three times a day  senna 2 Tablet(s) Oral at bedtime  sertraline 50 milliGRAM(s) Oral at bedtime    MEDICATIONS  (PRN):  acetaminophen   Tablet .. 650 milliGRAM(s) Oral every 6 hours PRN Mild Pain (1 - 3)  aluminum hydroxide/magnesium hydroxide/simethicone Suspension 30 milliLiter(s) Oral every 4 hours PRN Dyspepsia  bisacodyl 5 milliGRAM(s) Oral every 12 hours PRN Constipation  dextrose 40% Gel 15 Gram(s) Oral once PRN Blood Glucose LESS THAN 70 milliGRAM(s)/deciliter  glucagon  Injectable 1 milliGRAM(s) IntraMuscular once PRN Glucose LESS THAN 70 milligrams/deciliter  oxycodone    5 mG/acetaminophen 325 mG 2 Tablet(s) Oral every 6 hours PRN Moderate Pain (4 - 6)      Care Discussed with Consultants/Other Providers [ ] YES  [ ] NO

## 2020-01-19 NOTE — PROGRESS NOTE ADULT - ASSESSMENT
59M w/ COPD on 2L NC, DM2 on insulin, CAD s/p stent, HFpEF, pulm HTN, HTN, charted hx of schizophrenia/bipolar disorder, panic disorder, and recent admit for COPD exacerbation d/c 12/12/19 presents from [facility] ([facility]) for n/v/d and hyperglycemia. Admitted with diabetic ketoacidosis, found to have hyponatremia    A/P:    Hyponatremia:  Likely sec to hyperglycemia/dehydration  Also on SSRI  Optimize glucose control  Monitor Na level at present  Continue lasix at present in view of h/o CHF-Diastolic  Promote oral solute intake, avoid excessive free water intake     HTN:  Controlled   Monitor at present    Hyperkalemia  hemolyzed sample  Repeat serum K WNL

## 2020-01-19 NOTE — PROGRESS NOTE ADULT - SUBJECTIVE AND OBJECTIVE BOX
Chief complaint  Patient is a 59y old  Male who presents with a chief complaint of 59M w/ n/v/d and hyperglycemia sent in from [facility] (19 Jan 2020 08:04)   Review of systems  Patient in bed, looks comfortable, no fever, no hypoglycemia.    Labs and Fingersticks  CAPILLARY BLOOD GLUCOSE      POCT Blood Glucose.: 202 mg/dL (19 Jan 2020 12:37)  POCT Blood Glucose.: 236 mg/dL (19 Jan 2020 08:23)  POCT Blood Glucose.: 210 mg/dL (18 Jan 2020 22:29)  POCT Blood Glucose.: 223 mg/dL (18 Jan 2020 17:38)      Anion Gap, Serum: 12 (01-19 @ 07:12)      Calcium, Total Serum: 9.4 (01-19 @ 07:12)          01-19    137  |  96  |  14  ----------------------------<  220<H>  4.6   |  29  |  0.66    Ca    9.4      19 Jan 2020 07:12                          12.0   9.36  )-----------( 207      ( 19 Jan 2020 09:32 )             38.0     Medications  MEDICATIONS  (STANDING):  albuterol/ipratropium for Nebulization 3 milliLiter(s) Nebulizer every 6 hours  aspirin enteric coated 81 milliGRAM(s) Oral daily  atorvastatin 40 milliGRAM(s) Oral at bedtime  budesonide 160 MICROgram(s)/formoterol 4.5 MICROgram(s) Inhaler 2 Puff(s) Inhalation two times a day  dextrose 5%. 1000 milliLiter(s) (50 mL/Hr) IV Continuous <Continuous>  dextrose 50% Injectable 12.5 Gram(s) IV Push once  dextrose 50% Injectable 25 Gram(s) IV Push once  dextrose 50% Injectable 25 Gram(s) IV Push once  enoxaparin Injectable 40 milliGRAM(s) SubCutaneous daily  furosemide    Tablet 20 milliGRAM(s) Oral daily  insulin glargine Injectable (LANTUS) 70 Unit(s) SubCutaneous at bedtime  insulin lispro (HumaLOG) corrective regimen sliding scale   SubCutaneous three times a day before meals  insulin lispro (HumaLOG) corrective regimen sliding scale   SubCutaneous at bedtime  insulin lispro Injectable (HumaLOG) 27 Unit(s) SubCutaneous three times a day with meals  lisinopril 2.5 milliGRAM(s) Oral daily  melatonin 5 milliGRAM(s) Oral at bedtime  nicotine - Inhaler 1 Each Inhalation daily  pantoprazole    Tablet 40 milliGRAM(s) Oral daily  polyethylene glycol 3350 17 Gram(s) Oral every 12 hours  risperiDONE   Tablet 0.5 milliGRAM(s) Oral three times a day  senna 2 Tablet(s) Oral at bedtime  sertraline 50 milliGRAM(s) Oral at bedtime      Physical Exam  General: Patient comfortable in bed  Vital Signs Last 12 Hrs  T(F): 98.4 (01-19-20 @ 08:00), Max: 98.4 (01-19-20 @ 08:00)  HR: 73 (01-19-20 @ 08:00) (73 - 88)  BP: 119/72 (01-19-20 @ 08:00) (107/69 - 119/72)  BP(mean): --  RR: 18 (01-19-20 @ 08:00) (18 - 18)  SpO2: 97% (01-19-20 @ 08:00) (96% - 97%)  Neck: No palpable thyroid nodules.  CVS: S1S2, No murmurs  Respiratory: No wheezing, no crepitations  GI: Abdomen soft, bowel sounds positive  Musculoskeletal:  edema lower extremities.   Skin: No skin rashes, no ecchymosis    Diagnostics Chief complaint  Patient is a 59y old  Male who presents with a chief complaint of 59M w/ n/v/d and hyperglycemia sent in from [facility] (19 Jan 2020 08:04)   Review of systems  Patient in bed, looks comfortable, no fever,  no hypoglycemia.    Labs and Fingersticks  CAPILLARY BLOOD GLUCOSE      POCT Blood Glucose.: 202 mg/dL (19 Jan 2020 12:37)  POCT Blood Glucose.: 236 mg/dL (19 Jan 2020 08:23)  POCT Blood Glucose.: 210 mg/dL (18 Jan 2020 22:29)  POCT Blood Glucose.: 223 mg/dL (18 Jan 2020 17:38)      Anion Gap, Serum: 12 (01-19 @ 07:12)      Calcium, Total Serum: 9.4 (01-19 @ 07:12)          01-19    137  |  96  |  14  ----------------------------<  220<H>  4.6   |  29  |  0.66    Ca    9.4      19 Jan 2020 07:12                          12.0   9.36  )-----------( 207      ( 19 Jan 2020 09:32 )             38.0     Medications  MEDICATIONS  (STANDING):  albuterol/ipratropium for Nebulization 3 milliLiter(s) Nebulizer every 6 hours  aspirin enteric coated 81 milliGRAM(s) Oral daily  atorvastatin 40 milliGRAM(s) Oral at bedtime  budesonide 160 MICROgram(s)/formoterol 4.5 MICROgram(s) Inhaler 2 Puff(s) Inhalation two times a day  dextrose 5%. 1000 milliLiter(s) (50 mL/Hr) IV Continuous <Continuous>  dextrose 50% Injectable 12.5 Gram(s) IV Push once  dextrose 50% Injectable 25 Gram(s) IV Push once  dextrose 50% Injectable 25 Gram(s) IV Push once  enoxaparin Injectable 40 milliGRAM(s) SubCutaneous daily  furosemide    Tablet 20 milliGRAM(s) Oral daily  insulin glargine Injectable (LANTUS) 70 Unit(s) SubCutaneous at bedtime  insulin lispro (HumaLOG) corrective regimen sliding scale   SubCutaneous three times a day before meals  insulin lispro (HumaLOG) corrective regimen sliding scale   SubCutaneous at bedtime  insulin lispro Injectable (HumaLOG) 27 Unit(s) SubCutaneous three times a day with meals  lisinopril 2.5 milliGRAM(s) Oral daily  melatonin 5 milliGRAM(s) Oral at bedtime  nicotine - Inhaler 1 Each Inhalation daily  pantoprazole    Tablet 40 milliGRAM(s) Oral daily  polyethylene glycol 3350 17 Gram(s) Oral every 12 hours  risperiDONE   Tablet 0.5 milliGRAM(s) Oral three times a day  senna 2 Tablet(s) Oral at bedtime  sertraline 50 milliGRAM(s) Oral at bedtime      Physical Exam  General: Patient comfortable in bed  Vital Signs Last 12 Hrs  T(F): 98.4 (01-19-20 @ 08:00), Max: 98.4 (01-19-20 @ 08:00)  HR: 73 (01-19-20 @ 08:00) (73 - 88)  BP: 119/72 (01-19-20 @ 08:00) (107/69 - 119/72)  BP(mean): --  RR: 18 (01-19-20 @ 08:00) (18 - 18)  SpO2: 97% (01-19-20 @ 08:00) (96% - 97%)  Neck: No palpable thyroid nodules.  CVS: S1S2, No murmurs  Respiratory: No wheezing, no crepitations  GI: Abdomen soft, bowel sounds positive  Musculoskeletal:  edema lower extremities.   Skin: No skin rashes, no ecchymosis    Diagnostics

## 2020-01-19 NOTE — PROGRESS NOTE ADULT - ASSESSMENT
Assessment  DMT2: 59y Male with DM T2 with hyperglycemia, now on basal bolus insulin, blood sugars consistently running high, not at target (, 236, 202), no hypoglycemic episodes. Patient is eating meals with good appetite, comfortable and alert, DC to rehab pending.  CAD: on medications, no chest pain, stable, monitored.  HTN: Controlled,  on antihypertensive medications.  Overweight/Obesity: No strict exercise routines, not on any weight loss program, neither on low  calorie diet.          Laury Romero MD  Cell: [phone]  Office: [phone] Assessment  DMT2: 59y Male with DM T2 with hyperglycemia, now on basal bolus insulin, blood sugars consistently running high, not at target (, 236, 202), no hypoglycemic episodes.  Patient is eating meals with good appetite, comfortable and alert, DC to rehab pending.  CAD: on medications, no chest pain, stable, monitored.  HTN: Controlled,  on antihypertensive medications.  Overweight/Obesity: No strict exercise routines, not on any weight loss program, neither on low  calorie diet.          Laury Romero MD  Cell: [phone]  Office: [phone]

## 2020-01-19 NOTE — PROGRESS NOTE ADULT - PROBLEM SELECTOR PLAN 1
Will increase Lantus to 70u at bedtime.  Will increase Humalog to 27u before each meal and continue Humalog correction scale coverage. Will continue monitoring FS, log, will Follow up.  Patient counseled for compliance with consistent low carb diet, exercise as tolerated as out patient. Patient counseled for compliance with consistent low carb diet, exercise as tolerated as out patient.

## 2020-01-19 NOTE — PROGRESS NOTE ADULT - SUBJECTIVE AND OBJECTIVE BOX
[facility]   MD JONAH SNOW MD RUORU WONG, PA    TEL:  OFFICE: [phone]  DR LUGO CELL: [phone]  PATRIZIA BRISCOE CELL: [phone]  DR. FINCH CELL: [phone]  DR. BARRIOS CELL: [phone]    FROM 5 PM - 7 AM PLEASE CALL ANSWERING SERVICE: [phone]    RENAL FOLLOW UP NOTE  --------------------------------------------------------------------------------  HPI:      Pt seen and examined at bedside.   Denies SOB, chest pain     PAST HISTORY  --------------------------------------------------------------------------------  No significant changes to PMH, PSH, FHx, SHx, unless otherwise noted    ALLERGIES & MEDICATIONS  --------------------------------------------------------------------------------  Allergies    No Known Allergies    Intolerances      Standing Inpatient Medications  albuterol/ipratropium for Nebulization 3 milliLiter(s) Nebulizer every 6 hours  aspirin enteric coated 81 milliGRAM(s) Oral daily  atorvastatin 40 milliGRAM(s) Oral at bedtime  budesonide 160 MICROgram(s)/formoterol 4.5 MICROgram(s) Inhaler 2 Puff(s) Inhalation two times a day  dextrose 5%. 1000 milliLiter(s) IV Continuous <Continuous>  dextrose 50% Injectable 12.5 Gram(s) IV Push once  dextrose 50% Injectable 25 Gram(s) IV Push once  dextrose 50% Injectable 25 Gram(s) IV Push once  enoxaparin Injectable 40 milliGRAM(s) SubCutaneous daily  furosemide    Tablet 20 milliGRAM(s) Oral daily  insulin glargine Injectable (LANTUS) 66 Unit(s) SubCutaneous at bedtime  insulin lispro (HumaLOG) corrective regimen sliding scale   SubCutaneous three times a day before meals  insulin lispro (HumaLOG) corrective regimen sliding scale   SubCutaneous at bedtime  insulin lispro Injectable (HumaLOG) 25 Unit(s) SubCutaneous three times a day with meals  lisinopril 2.5 milliGRAM(s) Oral daily  melatonin 5 milliGRAM(s) Oral at bedtime  nicotine - Inhaler 1 Each Inhalation daily  pantoprazole    Tablet 40 milliGRAM(s) Oral daily  polyethylene glycol 3350 17 Gram(s) Oral every 12 hours  risperiDONE   Tablet 0.5 milliGRAM(s) Oral three times a day  sertraline 50 milliGRAM(s) Oral at bedtime    PRN Inpatient Medications  acetaminophen   Tablet .. 650 milliGRAM(s) Oral every 6 hours PRN  aluminum hydroxide/magnesium hydroxide/simethicone Suspension 30 milliLiter(s) Oral every 4 hours PRN  dextrose 40% Gel 15 Gram(s) Oral once PRN  glucagon  Injectable 1 milliGRAM(s) IntraMuscular once PRN  oxycodone    5 mG/acetaminophen 325 mG 2 Tablet(s) Oral every 6 hours PRN      REVIEW OF SYSTEMS  --------------------------------------------------------------------------------  General: no fever  CVS: no chest pain  RESP: no sob, no cough  ABD: no abdominal pain  : no dysuria,  MSK: no edema     VITALS/PHYSICAL EXAM  --------------------------------------------------------------------------------  T(C): 36.9 (01-19-20 @ 08:00), Max: 36.9 (01-19-20 @ 08:00)  HR: 73 (01-19-20 @ 08:00) (73 - 88)  BP: 119/72 (01-19-20 @ 08:00) (107/69 - 125/75)  RR: 18 (01-19-20 @ 08:00) (18 - 18)  SpO2: 97% (01-19-20 @ 08:00) (92% - 97%)  Wt(kg): --        01-18-20 @ 07:01  -  01-19-20 @ 07:00  --------------------------------------------------------  IN: 480 mL / OUT: 800 mL / NET: -320 mL      Physical Exam:  	Gen: NAD  	HEENT: MMM  	Pulm: CTA B/L  	CV: S1S2  	Abd: Soft, +BS  	Ext: No LE edema B/L                      Neuro: Awake  non focal  	Skin: Warm and Dry   	 no deedee    LABS/STUDIES  --------------------------------------------------------------------------------              11.7   11.04 >-----------<  216      [01-17-20 @ 09:46]              36.2     137  |  96  |  14  ----------------------------<  220      [01-19-20 @ 07:12]  4.6   |  29  |  0.66        Ca     9.4     [01-19-20 @ 07:12]      Mg     1.8     [01-17-20 @ 12:58]      Phos  4.8     [01-17-20 @ 12:58]            Creatinine Trend:  SCr 0.66 [01-19 @ 07:12]  SCr 0.67 [01-17 @ 12:58]  SCr 0.67 [01-17 @ 06:03]  SCr 0.60 [01-14 @ 06:54]  SCr 0.61 [01-12 @ 06:21]    Urinalysis - [01-03-20 @ 22:18]      Color Light Yellow / Appearance Clear / SG 1.035 / pH 6.0      Gluc 1000 mg/dL / Ketone Small  / Bili Negative / Urobili Negative       Blood Small / Protein Negative / Leuk Est Negative / Nitrite Negative      RBC 11 / WBC 0 / Hyaline 0 / Gran  / Sq Epi  / Non Sq Epi 0 / Bacteria Negative      HbA1c 10.4      [12-04-19 @ 08:53]

## 2020-01-20 LAB
GLUCOSE BLDC GLUCOMTR-MCNC: 172 MG/DL — HIGH (ref 70–99)
GLUCOSE BLDC GLUCOMTR-MCNC: 193 MG/DL — HIGH (ref 70–99)
GLUCOSE BLDC GLUCOMTR-MCNC: 231 MG/DL — HIGH (ref 70–99)
GLUCOSE BLDC GLUCOMTR-MCNC: 264 MG/DL — HIGH (ref 70–99)

## 2020-01-20 RX ADMIN — OXYCODONE AND ACETAMINOPHEN 2 TABLET(S): 5; 325 TABLET ORAL at 18:37

## 2020-01-20 RX ADMIN — OXYCODONE AND ACETAMINOPHEN 2 TABLET(S): 5; 325 TABLET ORAL at 12:05

## 2020-01-20 RX ADMIN — Medication 81 MILLIGRAM(S): at 11:55

## 2020-01-20 RX ADMIN — Medication 1: at 08:35

## 2020-01-20 RX ADMIN — ENOXAPARIN SODIUM 40 MILLIGRAM(S): 100 INJECTION SUBCUTANEOUS at 11:55

## 2020-01-20 RX ADMIN — Medication 1 EACH: at 11:57

## 2020-01-20 RX ADMIN — Medication 27 UNIT(S): at 12:36

## 2020-01-20 RX ADMIN — Medication 3 MILLILITER(S): at 05:47

## 2020-01-20 RX ADMIN — BUDESONIDE AND FORMOTEROL FUMARATE DIHYDRATE 2 PUFF(S): 160; 4.5 AEROSOL RESPIRATORY (INHALATION) at 17:37

## 2020-01-20 RX ADMIN — PANTOPRAZOLE SODIUM 40 MILLIGRAM(S): 20 TABLET, DELAYED RELEASE ORAL at 11:56

## 2020-01-20 RX ADMIN — RISPERIDONE 0.5 MILLIGRAM(S): 4 TABLET ORAL at 16:24

## 2020-01-20 RX ADMIN — Medication 1: at 12:36

## 2020-01-20 RX ADMIN — Medication 3 MILLILITER(S): at 00:22

## 2020-01-20 RX ADMIN — LISINOPRIL 2.5 MILLIGRAM(S): 2.5 TABLET ORAL at 05:47

## 2020-01-20 RX ADMIN — RISPERIDONE 0.5 MILLIGRAM(S): 4 TABLET ORAL at 22:42

## 2020-01-20 RX ADMIN — ATORVASTATIN CALCIUM 40 MILLIGRAM(S): 80 TABLET, FILM COATED ORAL at 22:42

## 2020-01-20 RX ADMIN — OXYCODONE AND ACETAMINOPHEN 2 TABLET(S): 5; 325 TABLET ORAL at 06:18

## 2020-01-20 RX ADMIN — Medication 27 UNIT(S): at 17:36

## 2020-01-20 RX ADMIN — OXYCODONE AND ACETAMINOPHEN 2 TABLET(S): 5; 325 TABLET ORAL at 05:48

## 2020-01-20 RX ADMIN — Medication 3 MILLILITER(S): at 11:56

## 2020-01-20 RX ADMIN — Medication 20 MILLIGRAM(S): at 05:47

## 2020-01-20 RX ADMIN — Medication 3 MILLILITER(S): at 17:35

## 2020-01-20 RX ADMIN — OXYCODONE AND ACETAMINOPHEN 2 TABLET(S): 5; 325 TABLET ORAL at 12:40

## 2020-01-20 RX ADMIN — Medication 5 MILLIGRAM(S): at 18:37

## 2020-01-20 RX ADMIN — Medication 5 MILLIGRAM(S): at 22:42

## 2020-01-20 RX ADMIN — SENNA PLUS 2 TABLET(S): 8.6 TABLET ORAL at 22:42

## 2020-01-20 RX ADMIN — Medication 3: at 17:36

## 2020-01-20 RX ADMIN — RISPERIDONE 0.5 MILLIGRAM(S): 4 TABLET ORAL at 05:47

## 2020-01-20 RX ADMIN — INSULIN GLARGINE 70 UNIT(S): 100 INJECTION, SOLUTION SUBCUTANEOUS at 22:42

## 2020-01-20 RX ADMIN — SERTRALINE 50 MILLIGRAM(S): 25 TABLET, FILM COATED ORAL at 22:42

## 2020-01-20 RX ADMIN — BUDESONIDE AND FORMOTEROL FUMARATE DIHYDRATE 2 PUFF(S): 160; 4.5 AEROSOL RESPIRATORY (INHALATION) at 05:47

## 2020-01-20 RX ADMIN — POLYETHYLENE GLYCOL 3350 17 GRAM(S): 17 POWDER, FOR SOLUTION ORAL at 11:55

## 2020-01-20 RX ADMIN — OXYCODONE AND ACETAMINOPHEN 2 TABLET(S): 5; 325 TABLET ORAL at 19:07

## 2020-01-20 RX ADMIN — Medication 27 UNIT(S): at 08:35

## 2020-01-20 NOTE — PROGRESS NOTE ADULT - SUBJECTIVE AND OBJECTIVE BOX
Chief complaint  Patient is a 59y old  Male who presents with a chief complaint of 59M w/ n/v/d and hyperglycemia sent in from [facility] (20 Jan 2020 09:15)   Review of systems  Patient in bed, looks comfortable, no hypoglycemia.    Labs and Fingersticks  CAPILLARY BLOOD GLUCOSE      POCT Blood Glucose.: 172 mg/dL (20 Jan 2020 12:16)  POCT Blood Glucose.: 193 mg/dL (20 Jan 2020 08:08)  POCT Blood Glucose.: 128 mg/dL (19 Jan 2020 22:17)  POCT Blood Glucose.: 166 mg/dL (19 Jan 2020 17:37)      Anion Gap, Serum: 12 (01-19 @ 07:12)      Calcium, Total Serum: 9.4 (01-19 @ 07:12)          01-19    137  |  96  |  14  ----------------------------<  220<H>  4.6   |  29  |  0.66    Ca    9.4      19 Jan 2020 07:12                          12.0   9.36  )-----------( 207      ( 19 Jan 2020 09:32 )             38.0     Medications  MEDICATIONS  (STANDING):  albuterol/ipratropium for Nebulization 3 milliLiter(s) Nebulizer every 6 hours  aspirin enteric coated 81 milliGRAM(s) Oral daily  atorvastatin 40 milliGRAM(s) Oral at bedtime  budesonide 160 MICROgram(s)/formoterol 4.5 MICROgram(s) Inhaler 2 Puff(s) Inhalation two times a day  dextrose 5%. 1000 milliLiter(s) (50 mL/Hr) IV Continuous <Continuous>  dextrose 50% Injectable 12.5 Gram(s) IV Push once  dextrose 50% Injectable 25 Gram(s) IV Push once  dextrose 50% Injectable 25 Gram(s) IV Push once  enoxaparin Injectable 40 milliGRAM(s) SubCutaneous daily  furosemide    Tablet 20 milliGRAM(s) Oral daily  insulin glargine Injectable (LANTUS) 70 Unit(s) SubCutaneous at bedtime  insulin lispro (HumaLOG) corrective regimen sliding scale   SubCutaneous three times a day before meals  insulin lispro (HumaLOG) corrective regimen sliding scale   SubCutaneous at bedtime  insulin lispro Injectable (HumaLOG) 27 Unit(s) SubCutaneous three times a day with meals  lisinopril 2.5 milliGRAM(s) Oral daily  melatonin 5 milliGRAM(s) Oral at bedtime  nicotine - Inhaler 1 Each Inhalation daily  pantoprazole    Tablet 40 milliGRAM(s) Oral daily  polyethylene glycol 3350 17 Gram(s) Oral every 12 hours  risperiDONE   Tablet 0.5 milliGRAM(s) Oral three times a day  senna 2 Tablet(s) Oral at bedtime  sertraline 50 milliGRAM(s) Oral at bedtime      Physical Exam  General: Patient comfortable in bed  Vital Signs Last 12 Hrs  T(F): 97.2 (01-20-20 @ 05:41), Max: 97.2 (01-20-20 @ 05:41)  HR: 68 (01-20-20 @ 05:41) (68 - 68)  BP: 104/63 (01-20-20 @ 06:50) (100/62 - 104/63)  BP(mean): --  RR: 16 (01-20-20 @ 05:41) (16 - 16)  SpO2: 96% (01-20-20 @ 05:41) (96% - 96%)  Neck: No palpable thyroid nodules.  CVS: S1S2, No murmurs  Respiratory: No wheezing, no crepitations  GI: Abdomen soft, bowel sounds positive  Musculoskeletal:  edema lower extremities.   Skin: No skin rashes, no ecchymosis    Diagnostics Chief complaint  Patient is a 59y old  Male who presents with a chief complaint of 59M w/ n/v/d and hyperglycemia sent in from [facility] (20 Jan 2020 09:15)   Review of systems  Patient in bed, looks comfortable,  no hypoglycemia.    Labs and Fingersticks  CAPILLARY BLOOD GLUCOSE      POCT Blood Glucose.: 172 mg/dL (20 Jan 2020 12:16)  POCT Blood Glucose.: 193 mg/dL (20 Jan 2020 08:08)  POCT Blood Glucose.: 128 mg/dL (19 Jan 2020 22:17)  POCT Blood Glucose.: 166 mg/dL (19 Jan 2020 17:37)      Anion Gap, Serum: 12 (01-19 @ 07:12)      Calcium, Total Serum: 9.4 (01-19 @ 07:12)          01-19    137  |  96  |  14  ----------------------------<  220<H>  4.6   |  29  |  0.66    Ca    9.4      19 Jan 2020 07:12                          12.0   9.36  )-----------( 207      ( 19 Jan 2020 09:32 )             38.0     Medications  MEDICATIONS  (STANDING):  albuterol/ipratropium for Nebulization 3 milliLiter(s) Nebulizer every 6 hours  aspirin enteric coated 81 milliGRAM(s) Oral daily  atorvastatin 40 milliGRAM(s) Oral at bedtime  budesonide 160 MICROgram(s)/formoterol 4.5 MICROgram(s) Inhaler 2 Puff(s) Inhalation two times a day  dextrose 5%. 1000 milliLiter(s) (50 mL/Hr) IV Continuous <Continuous>  dextrose 50% Injectable 12.5 Gram(s) IV Push once  dextrose 50% Injectable 25 Gram(s) IV Push once  dextrose 50% Injectable 25 Gram(s) IV Push once  enoxaparin Injectable 40 milliGRAM(s) SubCutaneous daily  furosemide    Tablet 20 milliGRAM(s) Oral daily  insulin glargine Injectable (LANTUS) 70 Unit(s) SubCutaneous at bedtime  insulin lispro (HumaLOG) corrective regimen sliding scale   SubCutaneous three times a day before meals  insulin lispro (HumaLOG) corrective regimen sliding scale   SubCutaneous at bedtime  insulin lispro Injectable (HumaLOG) 27 Unit(s) SubCutaneous three times a day with meals  lisinopril 2.5 milliGRAM(s) Oral daily  melatonin 5 milliGRAM(s) Oral at bedtime  nicotine - Inhaler 1 Each Inhalation daily  pantoprazole    Tablet 40 milliGRAM(s) Oral daily  polyethylene glycol 3350 17 Gram(s) Oral every 12 hours  risperiDONE   Tablet 0.5 milliGRAM(s) Oral three times a day  senna 2 Tablet(s) Oral at bedtime  sertraline 50 milliGRAM(s) Oral at bedtime      Physical Exam  General: Patient comfortable in bed  Vital Signs Last 12 Hrs  T(F): 97.2 (01-20-20 @ 05:41), Max: 97.2 (01-20-20 @ 05:41)  HR: 68 (01-20-20 @ 05:41) (68 - 68)  BP: 104/63 (01-20-20 @ 06:50) (100/62 - 104/63)  BP(mean): --  RR: 16 (01-20-20 @ 05:41) (16 - 16)  SpO2: 96% (01-20-20 @ 05:41) (96% - 96%)  Neck: No palpable thyroid nodules.  CVS: S1S2, No murmurs  Respiratory: No wheezing, no crepitations  GI: Abdomen soft, bowel sounds positive  Musculoskeletal:  edema lower extremities.   Skin: No skin rashes, no ecchymosis    Diagnostics

## 2020-01-20 NOTE — PROGRESS NOTE ADULT - PROBLEM SELECTOR PLAN 1
Will continue current insulin regimen for now. Will continue monitoring FS, log, and FU.  Suggest to DC to rehab on current basal bolus insulin regimen and FU endo 4 weeks.  Patient counseled for compliance with consistent low carb diet and exercise as tolerated outpatient. Will continue current insulin regimen for now. Will continue monitoring FS, log, and FU.

## 2020-01-20 NOTE — PROGRESS NOTE ADULT - ASSESSMENT
59M w/ COPD on 2L NC, DM2 on insulin, CAD s/p stent, HFpEF, pulm HTN, HTN, charted hx of schizophrenia/bipolar disorder, panic disorder, and recent admit for COPD exacerbation d/c 12/12/19 presents from [facility] ([facility]) for n/v/d and hyperglycemia. Admitted with diabetic ketoacidosis, found to have hyponatremia    A/P:    Hyponatremia:  Likely sec to hyperglycemia/dehydration  Also on SSRI  Optimize glucose control  Monitor Na level at present  Continue lasix at present in view of h/o CHF-Diastolic  Promote oral solute intake, avoid excessive free water intake     HTN:  Controlled   Monitor at present    Hyperkalemia  hemolyzed sample  Repeat serum K WNL

## 2020-01-20 NOTE — PROGRESS NOTE ADULT - SUBJECTIVE AND OBJECTIVE BOX
[facility] [place] [facility]   MD JONAH SNOW MD RUORU WONG, PA    TEL:  OFFICE: [phone]  DR LUGO CELL: [phone]  PATRIZIA BRISCOE CELL: [phone]  DR. FINCH CELL: [phone]  DR. BARRIOS CELL: [phone]    FROM 5 PM - 7 AM PLEASE CALL ANSWERING SERVICE: [phone]    RENAL FOLLOW UP NOTE  --------------------------------------------------------------------------------  HPI:      Pt seen and examined at bedside.   Denies SOB, chest pain     PAST HISTORY  --------------------------------------------------------------------------------  No significant changes to PMH, PSH, FHx, SHx, unless otherwise noted    ALLERGIES & MEDICATIONS  --------------------------------------------------------------------------------  Allergies    No Known Allergies    Intolerances      Standing Inpatient Medications  albuterol/ipratropium for Nebulization 3 milliLiter(s) Nebulizer every 6 hours  aspirin enteric coated 81 milliGRAM(s) Oral daily  atorvastatin 40 milliGRAM(s) Oral at bedtime  budesonide 160 MICROgram(s)/formoterol 4.5 MICROgram(s) Inhaler 2 Puff(s) Inhalation two times a day  dextrose 5%. 1000 milliLiter(s) IV Continuous <Continuous>  dextrose 50% Injectable 12.5 Gram(s) IV Push once  dextrose 50% Injectable 25 Gram(s) IV Push once  dextrose 50% Injectable 25 Gram(s) IV Push once  enoxaparin Injectable 40 milliGRAM(s) SubCutaneous daily  furosemide    Tablet 20 milliGRAM(s) Oral daily  insulin glargine Injectable (LANTUS) 70 Unit(s) SubCutaneous at bedtime  insulin lispro (HumaLOG) corrective regimen sliding scale   SubCutaneous three times a day before meals  insulin lispro (HumaLOG) corrective regimen sliding scale   SubCutaneous at bedtime  insulin lispro Injectable (HumaLOG) 27 Unit(s) SubCutaneous three times a day with meals  lisinopril 2.5 milliGRAM(s) Oral daily  melatonin 5 milliGRAM(s) Oral at bedtime  nicotine - Inhaler 1 Each Inhalation daily  pantoprazole    Tablet 40 milliGRAM(s) Oral daily  polyethylene glycol 3350 17 Gram(s) Oral every 12 hours  risperiDONE   Tablet 0.5 milliGRAM(s) Oral three times a day  senna 2 Tablet(s) Oral at bedtime  sertraline 50 milliGRAM(s) Oral at bedtime    PRN Inpatient Medications  acetaminophen   Tablet .. 650 milliGRAM(s) Oral every 6 hours PRN  aluminum hydroxide/magnesium hydroxide/simethicone Suspension 30 milliLiter(s) Oral every 4 hours PRN  bisacodyl 5 milliGRAM(s) Oral every 12 hours PRN  dextrose 40% Gel 15 Gram(s) Oral once PRN  glucagon  Injectable 1 milliGRAM(s) IntraMuscular once PRN  oxycodone    5 mG/acetaminophen 325 mG 2 Tablet(s) Oral every 6 hours PRN      REVIEW OF SYSTEMS  --------------------------------------------------------------------------------  General: no fever  CVS: no chest pain  RESP: no sob, no cough  ABD: no abdominal pain  : no dysuria,  MSK: no edema     VITALS/PHYSICAL EXAM  --------------------------------------------------------------------------------  T(C): 36.2 (01-20-20 @ 05:41), Max: 36.4 (01-19-20 @ 23:59)  HR: 68 (01-20-20 @ 05:41) (68 - 82)  BP: 104/63 (01-20-20 @ 06:50) (100/62 - 113/72)  RR: 16 (01-20-20 @ 05:41) (16 - 18)  SpO2: 96% (01-20-20 @ 05:41) (96% - 97%)  Wt(kg): --        01-19-20 @ 07:01  -  01-20-20 @ 07:00  --------------------------------------------------------  IN: 480 mL / OUT: 500 mL / NET: -20 mL      Physical Exam:  	Gen: NAD  	HEENT: MMM  	Pulm: CTA B/L  	CV: S1S2  	Abd: Soft, +BS  	Ext: No LE edema B/L                      Neuro: Awake non focal  	Skin: Warm and Dry   	 no deedee    LABS/STUDIES  --------------------------------------------------------------------------------              12.0   9.36  >-----------<  207      [01-19-20 @ 09:32]              38.0     137  |  96  |  14  ----------------------------<  220      [01-19-20 @ 07:12]  4.6   |  29  |  0.66        Ca     9.4     [01-19-20 @ 07:12]            Creatinine Trend:  SCr 0.66 [01-19 @ 07:12]  SCr 0.67 [01-17 @ 12:58]  SCr 0.67 [01-17 @ 06:03]  SCr 0.60 [01-14 @ 06:54]  SCr 0.61 [01-12 @ 06:21]    Urinalysis - [01-03-20 @ 22:18]      Color Light Yellow / Appearance Clear / SG 1.035 / pH 6.0      Gluc 1000 mg/dL / Ketone Small  / Bili Negative / Urobili Negative       Blood Small / Protein Negative / Leuk Est Negative / Nitrite Negative      RBC 11 / WBC 0 / Hyaline 0 / Gran  / Sq Epi  / Non Sq Epi 0 / Bacteria Negative      HbA1c 10.4      [12-04-19 @ 08:53]

## 2020-01-20 NOTE — PROGRESS NOTE ADULT - SUBJECTIVE AND OBJECTIVE BOX
Patient is a 59y old  Male who presents with a chief complaint of 59M w/ n/v/d and hyperglycemia sent in from [facility] (20 Jan 2020 13:19)    pt. seen and examined     INTERVAL HPI/OVERNIGHT EVENTS:  T(C): 36.2 (01-20-20 @ 20:57), Max: 36.9 (01-20-20 @ 14:13)  HR: 64 (01-20-20 @ 20:57) (64 - 82)  BP: 100/61 (01-20-20 @ 20:57) (98/62 - 108/74)  RR: 18 (01-20-20 @ 20:57) (16 - 18)  SpO2: 97% (01-20-20 @ 20:57) (96% - 97%)  Wt(kg): --  I&O's Summary    19 Jan 2020 07:01  -  20 Jan 2020 07:00  --------------------------------------------------------  IN: 480 mL / OUT: 500 mL / NET: -20 mL    20 Jan 2020 07:01  -  20 Jan 2020 22:37  --------------------------------------------------------  IN: 880 mL / OUT: 1450 mL / NET: -570 mL        PAST MEDICAL & SURGICAL HISTORY:  Oxygen dependent  Gastroesophageal reflux disease, esophagitis presence not specified  Smoker  Bipolar 1 disorder  Coronary artery disease involving native coronary artery of native heart without angina pectoris  Type 2 diabetes mellitus without complication, with long-term current use of insulin  Pulmonary HTN  HLD (hyperlipidemia)  Stented coronary artery  COPD (chronic obstructive pulmonary disease)  No significant past surgical history      SOCIAL HISTORY  Alcohol:  Tobacco:  Illicit substance use:    FAMILY HISTORY:    REVIEW OF SYSTEMS:  CONSTITUTIONAL: No fever, weight loss, or fatigue  EYES: No eye pain, visual disturbances, or discharge  ENMT:  No difficulty hearing, tinnitus, vertigo; No sinus or throat pain  NECK: No pain or stiffness  RESPIRATORY: No cough, wheezing, chills or hemoptysis; No shortness of breath  CARDIOVASCULAR: No chest pain, palpitations, dizziness, or leg swelling  GASTROINTESTINAL: No abdominal or epigastric pain. No nausea, vomiting, or hematemesis; No diarrhea or constipation. No melena or hematochezia.  GENITOURINARY: No dysuria, frequency, hematuria, or incontinence  NEUROLOGICAL: No headaches, memory loss, loss of strength, numbness, or tremors  SKIN: No itching, burning, rashes, or lesions   LYMPH NODES: No enlarged glands  ENDOCRINE: No heat or cold intolerance; No hair loss  MUSCULOSKELETAL: No joint pain or swelling; No muscle, back, or extremity pain  PSYCHIATRIC: No depression, anxiety, mood swings, or difficulty sleeping  HEME/LYMPH: No easy bruising, or bleeding gums  ALLERY AND IMMUNOLOGIC: No hives or eczema    RADIOLOGY & ADDITIONAL TESTS:    Imaging Personally Reviewed:  [ ] YES  [ ] NO    Consultant(s) Notes Reviewed:  [ ] YES  [ ] NO    PHYSICAL EXAM:  GENERAL: NAD, well-groomed, well-developed  HEAD:  Atraumatic, Normocephalic  EYES: EOMI, PERRLA, conjunctiva and sclera clear  ENMT: No tonsillar erythema, exudates, or enlargement; Moist mucous membranes, Good dentition, No lesions  NECK: Supple, No JVD, Normal thyroid  NERVOUS SYSTEM:  Alert & Oriented X3, Good concentration; Motor Strength 5/5 B/L upper and lower extremities; DTRs 2+ intact and symmetric  CHEST/LUNG: Clear to percussion bilaterally; No rales, rhonchi, wheezing, or rubs  HEART: Regular rate and rhythm; No murmurs, rubs, or gallops  ABDOMEN: Soft, Nontender, Nondistended; Bowel sounds present  EXTREMITIES:  2+ Peripheral Pulses, No clubbing, cyanosis, or edema  LYMPH: No lymphadenopathy noted  SKIN: No rashes or lesions    LABS:                        12.0   9.36  )-----------( 207      ( 19 Jan 2020 09:32 )             38.0     01-19    137  |  96  |  14  ----------------------------<  220<H>  4.6   |  29  |  0.66    Ca    9.4      19 Jan 2020 07:12          CAPILLARY BLOOD GLUCOSE      POCT Blood Glucose.: 231 mg/dL (20 Jan 2020 21:58)  POCT Blood Glucose.: 264 mg/dL (20 Jan 2020 17:18)  POCT Blood Glucose.: 172 mg/dL (20 Jan 2020 12:16)  POCT Blood Glucose.: 193 mg/dL (20 Jan 2020 08:08)            MEDICATIONS  (STANDING):  albuterol/ipratropium for Nebulization 3 milliLiter(s) Nebulizer every 6 hours  aspirin enteric coated 81 milliGRAM(s) Oral daily  atorvastatin 40 milliGRAM(s) Oral at bedtime  budesonide 160 MICROgram(s)/formoterol 4.5 MICROgram(s) Inhaler 2 Puff(s) Inhalation two times a day  dextrose 5%. 1000 milliLiter(s) (50 mL/Hr) IV Continuous <Continuous>  dextrose 50% Injectable 12.5 Gram(s) IV Push once  dextrose 50% Injectable 25 Gram(s) IV Push once  dextrose 50% Injectable 25 Gram(s) IV Push once  enoxaparin Injectable 40 milliGRAM(s) SubCutaneous daily  furosemide    Tablet 20 milliGRAM(s) Oral daily  insulin glargine Injectable (LANTUS) 70 Unit(s) SubCutaneous at bedtime  insulin lispro (HumaLOG) corrective regimen sliding scale   SubCutaneous three times a day before meals  insulin lispro (HumaLOG) corrective regimen sliding scale   SubCutaneous at bedtime  insulin lispro Injectable (HumaLOG) 27 Unit(s) SubCutaneous three times a day with meals  lisinopril 2.5 milliGRAM(s) Oral daily  melatonin 5 milliGRAM(s) Oral at bedtime  nicotine - Inhaler 1 Each Inhalation daily  pantoprazole    Tablet 40 milliGRAM(s) Oral daily  polyethylene glycol 3350 17 Gram(s) Oral every 12 hours  risperiDONE   Tablet 0.5 milliGRAM(s) Oral three times a day  senna 2 Tablet(s) Oral at bedtime  sertraline 50 milliGRAM(s) Oral at bedtime    MEDICATIONS  (PRN):  acetaminophen   Tablet .. 650 milliGRAM(s) Oral every 6 hours PRN Mild Pain (1 - 3)  aluminum hydroxide/magnesium hydroxide/simethicone Suspension 30 milliLiter(s) Oral every 4 hours PRN Dyspepsia  bisacodyl 5 milliGRAM(s) Oral every 12 hours PRN Constipation  dextrose 40% Gel 15 Gram(s) Oral once PRN Blood Glucose LESS THAN 70 milliGRAM(s)/deciliter  glucagon  Injectable 1 milliGRAM(s) IntraMuscular once PRN Glucose LESS THAN 70 milligrams/deciliter  oxycodone    5 mG/acetaminophen 325 mG 2 Tablet(s) Oral every 6 hours PRN Moderate Pain (4 - 6)      Care Discussed with Consultants/Other Providers [ ] YES  [ ] NO

## 2020-01-20 NOTE — PROGRESS NOTE ADULT - ASSESSMENT
Assessment  DMT2: 59y Male with DM T2 with hyperglycemia, now on basal bolus insulin, increased dose yesterday, blood sugars improving, no hypoglycemic episodes. Patient is eating meals with good appetite, comfortable and alert, no complaints this AM.  CAD: on medications, no chest pain, stable, monitored.  HTN: Controlled,  on antihypertensive medications.  Overweight/Obesity: No strict exercise routines, not on any weight loss program, neither on low  calorie diet.          Laury Romero MD  Cell: [phone]  Office: [phone] Assessment  DMT2: 59y Male with DM T2 with hyperglycemia, now on basal bolus insulin, increased dose yesterday, blood sugars improving, no hypoglycemic episodes.  Patient is eating meals with good appetite, comfortable and alert, no complaints this AM.  CAD: on medications, no chest pain, stable, monitored.  HTN: Controlled,  on antihypertensive medications.  Overweight/Obesity: No strict exercise routines, not on any weight loss program, neither on low  calorie diet.          Laury Romero MD  Cell: [phone]  Office: [phone]

## 2020-01-21 LAB
GLUCOSE BLDC GLUCOMTR-MCNC: 138 MG/DL — HIGH (ref 70–99)
GLUCOSE BLDC GLUCOMTR-MCNC: 151 MG/DL — HIGH (ref 70–99)
GLUCOSE BLDC GLUCOMTR-MCNC: 162 MG/DL — HIGH (ref 70–99)
GLUCOSE BLDC GLUCOMTR-MCNC: 181 MG/DL — HIGH (ref 70–99)

## 2020-01-21 RX ADMIN — Medication 3 MILLILITER(S): at 12:09

## 2020-01-21 RX ADMIN — BUDESONIDE AND FORMOTEROL FUMARATE DIHYDRATE 2 PUFF(S): 160; 4.5 AEROSOL RESPIRATORY (INHALATION) at 06:41

## 2020-01-21 RX ADMIN — BUDESONIDE AND FORMOTEROL FUMARATE DIHYDRATE 2 PUFF(S): 160; 4.5 AEROSOL RESPIRATORY (INHALATION) at 17:46

## 2020-01-21 RX ADMIN — Medication 3 MILLILITER(S): at 06:41

## 2020-01-21 RX ADMIN — Medication 1: at 08:41

## 2020-01-21 RX ADMIN — OXYCODONE AND ACETAMINOPHEN 2 TABLET(S): 5; 325 TABLET ORAL at 14:52

## 2020-01-21 RX ADMIN — Medication 81 MILLIGRAM(S): at 12:10

## 2020-01-21 RX ADMIN — Medication 10 MILLIGRAM(S): at 08:58

## 2020-01-21 RX ADMIN — Medication 1: at 17:45

## 2020-01-21 RX ADMIN — Medication 1 ENEMA: at 12:08

## 2020-01-21 RX ADMIN — Medication 3 MILLILITER(S): at 23:53

## 2020-01-21 RX ADMIN — POLYETHYLENE GLYCOL 3350 17 GRAM(S): 17 POWDER, FOR SOLUTION ORAL at 17:46

## 2020-01-21 RX ADMIN — RISPERIDONE 0.5 MILLIGRAM(S): 4 TABLET ORAL at 20:52

## 2020-01-21 RX ADMIN — INSULIN GLARGINE 70 UNIT(S): 100 INJECTION, SOLUTION SUBCUTANEOUS at 22:16

## 2020-01-21 RX ADMIN — RISPERIDONE 0.5 MILLIGRAM(S): 4 TABLET ORAL at 06:42

## 2020-01-21 RX ADMIN — ENOXAPARIN SODIUM 40 MILLIGRAM(S): 100 INJECTION SUBCUTANEOUS at 12:10

## 2020-01-21 RX ADMIN — LISINOPRIL 2.5 MILLIGRAM(S): 2.5 TABLET ORAL at 06:44

## 2020-01-21 RX ADMIN — SERTRALINE 50 MILLIGRAM(S): 25 TABLET, FILM COATED ORAL at 20:52

## 2020-01-21 RX ADMIN — OXYCODONE AND ACETAMINOPHEN 2 TABLET(S): 5; 325 TABLET ORAL at 20:52

## 2020-01-21 RX ADMIN — POLYETHYLENE GLYCOL 3350 17 GRAM(S): 17 POWDER, FOR SOLUTION ORAL at 06:43

## 2020-01-21 RX ADMIN — PANTOPRAZOLE SODIUM 40 MILLIGRAM(S): 20 TABLET, DELAYED RELEASE ORAL at 12:10

## 2020-01-21 RX ADMIN — Medication 5 MILLIGRAM(S): at 20:52

## 2020-01-21 RX ADMIN — OXYCODONE AND ACETAMINOPHEN 2 TABLET(S): 5; 325 TABLET ORAL at 08:58

## 2020-01-21 RX ADMIN — Medication 27 UNIT(S): at 12:59

## 2020-01-21 RX ADMIN — OXYCODONE AND ACETAMINOPHEN 2 TABLET(S): 5; 325 TABLET ORAL at 01:44

## 2020-01-21 RX ADMIN — OXYCODONE AND ACETAMINOPHEN 2 TABLET(S): 5; 325 TABLET ORAL at 21:30

## 2020-01-21 RX ADMIN — OXYCODONE AND ACETAMINOPHEN 2 TABLET(S): 5; 325 TABLET ORAL at 00:45

## 2020-01-21 RX ADMIN — Medication 27 UNIT(S): at 08:41

## 2020-01-21 RX ADMIN — Medication 27 UNIT(S): at 17:45

## 2020-01-21 RX ADMIN — OXYCODONE AND ACETAMINOPHEN 2 TABLET(S): 5; 325 TABLET ORAL at 14:07

## 2020-01-21 RX ADMIN — OXYCODONE AND ACETAMINOPHEN 2 TABLET(S): 5; 325 TABLET ORAL at 09:30

## 2020-01-21 RX ADMIN — ATORVASTATIN CALCIUM 40 MILLIGRAM(S): 80 TABLET, FILM COATED ORAL at 20:52

## 2020-01-21 RX ADMIN — Medication 3 MILLILITER(S): at 17:45

## 2020-01-21 RX ADMIN — RISPERIDONE 0.5 MILLIGRAM(S): 4 TABLET ORAL at 13:01

## 2020-01-21 RX ADMIN — SENNA PLUS 2 TABLET(S): 8.6 TABLET ORAL at 20:52

## 2020-01-21 NOTE — PROGRESS NOTE ADULT - PROBLEM SELECTOR PLAN 1
Will continue current insulin regimen for now. Will continue monitoring FS, log, and FU.  Suggest to DC to rehab on current basal bolus insulin regimen and FU endo 2 weeks.  Patient counseled for compliance with consistent low carb diet and exercise as tolerated outpatient. Will continue current insulin regimen for now. Will continue monitoring FS, log, and FU.

## 2020-01-21 NOTE — PROGRESS NOTE ADULT - SUBJECTIVE AND OBJECTIVE BOX
[facility]   MD JONAH SNOW MD RUORU WONG, PA    TEL:  OFFICE: [phone]  DR LUGO CELL: [phone]  PATRIZIA BRISCOE CELL: [phone]  DR. FINCH CELL: [phone]  DR. BARRIOS CELL: [phone]    FROM 5 PM - 7 AM PLEASE CALL ANSWERING SERVICE: [phone]    RENAL FOLLOW UP NOTE  --------------------------------------------------------------------------------  HPI:      Pt seen and examined at bedside.   Denies SOB, chest pain     PAST HISTORY  --------------------------------------------------------------------------------  No significant changes to PMH, PSH, FHx, SHx, unless otherwise noted    ALLERGIES & MEDICATIONS  --------------------------------------------------------------------------------  Allergies    No Known Allergies    Intolerances      Standing Inpatient Medications  albuterol/ipratropium for Nebulization 3 milliLiter(s) Nebulizer every 6 hours  aspirin enteric coated 81 milliGRAM(s) Oral daily  atorvastatin 40 milliGRAM(s) Oral at bedtime  budesonide 160 MICROgram(s)/formoterol 4.5 MICROgram(s) Inhaler 2 Puff(s) Inhalation two times a day  dextrose 5%. 1000 milliLiter(s) IV Continuous <Continuous>  dextrose 50% Injectable 12.5 Gram(s) IV Push once  dextrose 50% Injectable 25 Gram(s) IV Push once  dextrose 50% Injectable 25 Gram(s) IV Push once  enoxaparin Injectable 40 milliGRAM(s) SubCutaneous daily  furosemide    Tablet 20 milliGRAM(s) Oral daily  insulin glargine Injectable (LANTUS) 70 Unit(s) SubCutaneous at bedtime  insulin lispro (HumaLOG) corrective regimen sliding scale   SubCutaneous three times a day before meals  insulin lispro (HumaLOG) corrective regimen sliding scale   SubCutaneous at bedtime  insulin lispro Injectable (HumaLOG) 27 Unit(s) SubCutaneous three times a day with meals  lisinopril 2.5 milliGRAM(s) Oral daily  melatonin 5 milliGRAM(s) Oral at bedtime  nicotine - Inhaler 1 Each Inhalation daily  pantoprazole    Tablet 40 milliGRAM(s) Oral daily  polyethylene glycol 3350 17 Gram(s) Oral every 12 hours  risperiDONE   Tablet 0.5 milliGRAM(s) Oral three times a day  senna 2 Tablet(s) Oral at bedtime  sertraline 50 milliGRAM(s) Oral at bedtime    PRN Inpatient Medications  acetaminophen   Tablet .. 650 milliGRAM(s) Oral every 6 hours PRN  aluminum hydroxide/magnesium hydroxide/simethicone Suspension 30 milliLiter(s) Oral every 4 hours PRN  bisacodyl 5 milliGRAM(s) Oral every 12 hours PRN  dextrose 40% Gel 15 Gram(s) Oral once PRN  glucagon  Injectable 1 milliGRAM(s) IntraMuscular once PRN  oxycodone    5 mG/acetaminophen 325 mG 2 Tablet(s) Oral every 6 hours PRN      REVIEW OF SYSTEMS  --------------------------------------------------------------------------------  General: no fever  CVS: no chest pain  RESP: no sob, no cough  ABD: no abdominal pain  : no dysuria,  MSK: no edema     VITALS/PHYSICAL EXAM  --------------------------------------------------------------------------------  T(C): 36.3 (01-21-20 @ 06:40), Max: 36.9 (01-20-20 @ 14:13)  HR: 66 (01-21-20 @ 06:40) (63 - 80)  BP: 98/58 (01-21-20 @ 06:40) (98/58 - 108/74)  RR: 18 (01-21-20 @ 06:40) (18 - 18)  SpO2: 98% (01-21-20 @ 06:40) (96% - 98%)  Wt(kg): --        01-20-20 @ 07:01  -  01-21-20 @ 07:00  --------------------------------------------------------  IN: 880 mL / OUT: 2000 mL / NET: -1120 mL      Physical Exam:  	Gen: NAD  	HEENT: MMM  	Pulm: CTA B/L  	CV: S1S2  	Abd: Soft, +BS  	Ext: No LE edema B/L                      Neuro: Awake   	Skin: Warm and Dry   	 no [place]    LABS/STUDIES  --------------------------------------------------------------------------------                Creatinine Trend:  SCr 0.66 [01-19 @ 07:12]  SCr 0.67 [01-17 @ 12:58]  SCr 0.67 [01-17 @ 06:03]  SCr 0.60 [01-14 @ 06:54]  SCr 0.61 [01-12 @ 06:21]    Urinalysis - [01-03-20 @ 22:18]      Color Light Yellow / Appearance Clear / SG 1.035 / pH 6.0      Gluc 1000 mg/dL / Ketone Small  / Bili Negative / Urobili Negative       Blood Small / Protein Negative / Leuk Est Negative / Nitrite Negative      RBC 11 / WBC 0 / Hyaline 0 / Gran  / Sq Epi  / Non Sq Epi 0 / Bacteria Negative      HbA1c 10.4      [12-04-19 @ 08:53]

## 2020-01-21 NOTE — PROGRESS NOTE ADULT - SUBJECTIVE AND OBJECTIVE BOX
Chief complaint  Patient is a 59y old  Male who presents with a chief complaint of 59M w/ n/v/d and hyperglycemia sent in from [facility] (21 Jan 2020 09:57)   Review of systems  Patient in bed, looks comfortable, no hypoglycemia.    Labs and Fingersticks  CAPILLARY BLOOD GLUCOSE      POCT Blood Glucose.: 138 mg/dL (21 Jan 2020 12:56)  POCT Blood Glucose.: 162 mg/dL (21 Jan 2020 08:20)  POCT Blood Glucose.: 231 mg/dL (20 Jan 2020 21:58)  POCT Blood Glucose.: 264 mg/dL (20 Jan 2020 17:18)                        Medications  MEDICATIONS  (STANDING):  albuterol/ipratropium for Nebulization 3 milliLiter(s) Nebulizer every 6 hours  aspirin enteric coated 81 milliGRAM(s) Oral daily  atorvastatin 40 milliGRAM(s) Oral at bedtime  budesonide 160 MICROgram(s)/formoterol 4.5 MICROgram(s) Inhaler 2 Puff(s) Inhalation two times a day  dextrose 5%. 1000 milliLiter(s) (50 mL/Hr) IV Continuous <Continuous>  dextrose 50% Injectable 12.5 Gram(s) IV Push once  dextrose 50% Injectable 25 Gram(s) IV Push once  dextrose 50% Injectable 25 Gram(s) IV Push once  enoxaparin Injectable 40 milliGRAM(s) SubCutaneous daily  furosemide    Tablet 20 milliGRAM(s) Oral daily  insulin glargine Injectable (LANTUS) 70 Unit(s) SubCutaneous at bedtime  insulin lispro (HumaLOG) corrective regimen sliding scale   SubCutaneous three times a day before meals  insulin lispro (HumaLOG) corrective regimen sliding scale   SubCutaneous at bedtime  insulin lispro Injectable (HumaLOG) 27 Unit(s) SubCutaneous three times a day with meals  lisinopril 2.5 milliGRAM(s) Oral daily  melatonin 5 milliGRAM(s) Oral at bedtime  nicotine - Inhaler 1 Each Inhalation daily  pantoprazole    Tablet 40 milliGRAM(s) Oral daily  polyethylene glycol 3350 17 Gram(s) Oral every 12 hours  risperiDONE   Tablet 0.5 milliGRAM(s) Oral three times a day  senna 2 Tablet(s) Oral at bedtime  sertraline 50 milliGRAM(s) Oral at bedtime      Physical Exam  General: Patient comfortable in bed  Vital Signs Last 12 Hrs  T(F): 98 (01-21-20 @ 13:17), Max: 98 (01-21-20 @ 13:17)  HR: 91 (01-21-20 @ 13:17) (66 - 91)  BP: 101/64 (01-21-20 @ 13:17) (98/58 - 101/64)  BP(mean): --  RR: 18 (01-21-20 @ 13:17) (18 - 18)  SpO2: 96% (01-21-20 @ 13:17) (96% - 98%)  Neck: No palpable thyroid nodules.  CVS: S1S2, No murmurs  Respiratory: No wheezing, no crepitations  GI: Abdomen soft, bowel sounds positive  Musculoskeletal:  edema lower extremities.   Skin: No skin rashes, no ecchymosis    Diagnostics Chief complaint  Patient is a 59y old  Male who presents with a chief complaint of 59M w/ n/v/d and hyperglycemia sent in from [facility] (21 Jan 2020 09:57)   Review of systems  Patient in bed, looks comfortable,  no hypoglycemia.    Labs and Fingersticks  CAPILLARY BLOOD GLUCOSE      POCT Blood Glucose.: 138 mg/dL (21 Jan 2020 12:56)  POCT Blood Glucose.: 162 mg/dL (21 Jan 2020 08:20)  POCT Blood Glucose.: 231 mg/dL (20 Jan 2020 21:58)  POCT Blood Glucose.: 264 mg/dL (20 Jan 2020 17:18)    Medications  MEDICATIONS  (STANDING):  albuterol/ipratropium for Nebulization 3 milliLiter(s) Nebulizer every 6 hours  aspirin enteric coated 81 milliGRAM(s) Oral daily  atorvastatin 40 milliGRAM(s) Oral at bedtime  budesonide 160 MICROgram(s)/formoterol 4.5 MICROgram(s) Inhaler 2 Puff(s) Inhalation two times a day  dextrose 5%. 1000 milliLiter(s) (50 mL/Hr) IV Continuous <Continuous>  dextrose 50% Injectable 12.5 Gram(s) IV Push once  dextrose 50% Injectable 25 Gram(s) IV Push once  dextrose 50% Injectable 25 Gram(s) IV Push once  enoxaparin Injectable 40 milliGRAM(s) SubCutaneous daily  furosemide    Tablet 20 milliGRAM(s) Oral daily  insulin glargine Injectable (LANTUS) 70 Unit(s) SubCutaneous at bedtime  insulin lispro (HumaLOG) corrective regimen sliding scale   SubCutaneous three times a day before meals  insulin lispro (HumaLOG) corrective regimen sliding scale   SubCutaneous at bedtime  insulin lispro Injectable (HumaLOG) 27 Unit(s) SubCutaneous three times a day with meals  lisinopril 2.5 milliGRAM(s) Oral daily  melatonin 5 milliGRAM(s) Oral at bedtime  nicotine - Inhaler 1 Each Inhalation daily  pantoprazole    Tablet 40 milliGRAM(s) Oral daily  polyethylene glycol 3350 17 Gram(s) Oral every 12 hours  risperiDONE   Tablet 0.5 milliGRAM(s) Oral three times a day  senna 2 Tablet(s) Oral at bedtime  sertraline 50 milliGRAM(s) Oral at bedtime      Physical Exam  General: Patient comfortable in bed  Vital Signs Last 12 Hrs  T(F): 98 (01-21-20 @ 13:17), Max: 98 (01-21-20 @ 13:17)  HR: 91 (01-21-20 @ 13:17) (66 - 91)  BP: 101/64 (01-21-20 @ 13:17) (98/58 - 101/64)  BP(mean): --  RR: 18 (01-21-20 @ 13:17) (18 - 18)  SpO2: 96% (01-21-20 @ 13:17) (96% - 98%)  Neck: No palpable thyroid nodules.  CVS: S1S2, No murmurs  Respiratory: No wheezing, no crepitations  GI: Abdomen soft, bowel sounds positive  Musculoskeletal:  edema lower extremities.   Skin: No skin rashes, no ecchymosis    Diagnostics

## 2020-01-21 NOTE — PROGRESS NOTE ADULT - SUBJECTIVE AND OBJECTIVE BOX
Patient is a 59y old  Male who presents with a chief complaint of 59M w/ n/v/d and hyperglycemia sent in from [facility] (21 Jan 2020 15:34)    pt. seen and examined, no c/o     INTERVAL HPI/OVERNIGHT EVENTS:  T(C): 36.8 (01-21-20 @ 20:17), Max: 36.8 (01-21-20 @ 20:17)  HR: 74 (01-21-20 @ 20:17) (63 - 91)  BP: 104/72 (01-21-20 @ 20:17) (98/58 - 117/75)  RR: 18 (01-21-20 @ 20:17) (18 - 18)  SpO2: 98% (01-21-20 @ 20:17) (96% - 98%)  Wt(kg): --  I&O's Summary    20 Jan 2020 07:01  -  21 Jan 2020 07:00  --------------------------------------------------------  IN: 880 mL / OUT: 2000 mL / NET: -1120 mL    21 Jan 2020 07:01  -  21 Jan 2020 20:37  --------------------------------------------------------  IN: 930 mL / OUT: 300 mL / NET: 630 mL        PAST MEDICAL & SURGICAL HISTORY:  Oxygen dependent  Gastroesophageal reflux disease, esophagitis presence not specified  Smoker  Bipolar 1 disorder  Coronary artery disease involving native coronary artery of native heart without angina pectoris  Type 2 diabetes mellitus without complication, with long-term current use of insulin  Pulmonary HTN  HLD (hyperlipidemia)  Stented coronary artery  COPD (chronic obstructive pulmonary disease)  No significant past surgical history      SOCIAL HISTORY  Alcohol:  Tobacco:  Illicit substance use:    FAMILY HISTORY:    REVIEW OF SYSTEMS:  CONSTITUTIONAL: No fever, weight loss, or fatigue  EYES: No eye pain, visual disturbances, or discharge  ENMT:  No difficulty hearing, tinnitus, vertigo; No sinus or throat pain  NECK: No pain or stiffness  RESPIRATORY: No cough, wheezing, chills or hemoptysis; No shortness of breath  CARDIOVASCULAR: No chest pain, palpitations, dizziness, or leg swelling  GASTROINTESTINAL: No abdominal or epigastric pain. No nausea, vomiting, or hematemesis; No diarrhea or constipation. No melena or hematochezia.  GENITOURINARY: No dysuria, frequency, hematuria, or incontinence  NEUROLOGICAL: No headaches, memory loss, loss of strength, numbness, or tremors  SKIN: No itching, burning, rashes, or lesions   LYMPH NODES: No enlarged glands  ENDOCRINE: No heat or cold intolerance; No hair loss  MUSCULOSKELETAL: No joint pain or swelling; No muscle, back, or extremity pain  PSYCHIATRIC: No depression, anxiety, mood swings, or difficulty sleeping  HEME/LYMPH: No easy bruising, or bleeding gums  ALLERY AND IMMUNOLOGIC: No hives or eczema    RADIOLOGY & ADDITIONAL TESTS:    Imaging Personally Reviewed:  [ ] YES  [ ] NO    Consultant(s) Notes Reviewed:  [ ] YES  [ ] NO    PHYSICAL EXAM:  GENERAL: NAD, well-groomed, well-developed  HEAD:  Atraumatic, Normocephalic  EYES: EOMI, PERRLA, conjunctiva and sclera clear  ENMT: No tonsillar erythema, exudates, or enlargement; Moist mucous membranes, Good dentition, No lesions  NECK: Supple, No JVD, Normal thyroid  NERVOUS SYSTEM:  Alert & Oriented X3, Good concentration; Motor Strength 5/5 B/L upper and lower extremities; DTRs 2+ intact and symmetric  CHEST/LUNG: Clear to percussion bilaterally; No rales, rhonchi, wheezing, or rubs  HEART: Regular rate and rhythm; No murmurs, rubs, or gallops  ABDOMEN: Soft, Nontender, Nondistended; Bowel sounds present  EXTREMITIES:  2+ Peripheral Pulses, No clubbing, cyanosis, or edema  LYMPH: No lymphadenopathy noted  SKIN: No rashes or lesions    LABS:              CAPILLARY BLOOD GLUCOSE      POCT Blood Glucose.: 151 mg/dL (21 Jan 2020 17:02)  POCT Blood Glucose.: 138 mg/dL (21 Jan 2020 12:56)  POCT Blood Glucose.: 162 mg/dL (21 Jan 2020 08:20)  POCT Blood Glucose.: 231 mg/dL (20 Jan 2020 21:58)            MEDICATIONS  (STANDING):  albuterol/ipratropium for Nebulization 3 milliLiter(s) Nebulizer every 6 hours  aspirin enteric coated 81 milliGRAM(s) Oral daily  atorvastatin 40 milliGRAM(s) Oral at bedtime  budesonide 160 MICROgram(s)/formoterol 4.5 MICROgram(s) Inhaler 2 Puff(s) Inhalation two times a day  dextrose 5%. 1000 milliLiter(s) (50 mL/Hr) IV Continuous <Continuous>  dextrose 50% Injectable 12.5 Gram(s) IV Push once  dextrose 50% Injectable 25 Gram(s) IV Push once  dextrose 50% Injectable 25 Gram(s) IV Push once  enoxaparin Injectable 40 milliGRAM(s) SubCutaneous daily  furosemide    Tablet 20 milliGRAM(s) Oral daily  insulin glargine Injectable (LANTUS) 70 Unit(s) SubCutaneous at bedtime  insulin lispro (HumaLOG) corrective regimen sliding scale   SubCutaneous three times a day before meals  insulin lispro (HumaLOG) corrective regimen sliding scale   SubCutaneous at bedtime  insulin lispro Injectable (HumaLOG) 27 Unit(s) SubCutaneous three times a day with meals  lisinopril 2.5 milliGRAM(s) Oral daily  melatonin 5 milliGRAM(s) Oral at bedtime  nicotine - Inhaler 1 Each Inhalation daily  pantoprazole    Tablet 40 milliGRAM(s) Oral daily  polyethylene glycol 3350 17 Gram(s) Oral every 12 hours  risperiDONE   Tablet 0.5 milliGRAM(s) Oral three times a day  senna 2 Tablet(s) Oral at bedtime  sertraline 50 milliGRAM(s) Oral at bedtime    MEDICATIONS  (PRN):  acetaminophen   Tablet .. 650 milliGRAM(s) Oral every 6 hours PRN Mild Pain (1 - 3)  aluminum hydroxide/magnesium hydroxide/simethicone Suspension 30 milliLiter(s) Oral every 4 hours PRN Dyspepsia  bisacodyl 5 milliGRAM(s) Oral every 12 hours PRN Constipation  dextrose 40% Gel 15 Gram(s) Oral once PRN Blood Glucose LESS THAN 70 milliGRAM(s)/deciliter  glucagon  Injectable 1 milliGRAM(s) IntraMuscular once PRN Glucose LESS THAN 70 milligrams/deciliter  oxycodone    5 mG/acetaminophen 325 mG 2 Tablet(s) Oral every 6 hours PRN Moderate Pain (4 - 6)      Care Discussed with Consultants/Other Providers [ ] YES  [ ] NO

## 2020-01-21 NOTE — PROGRESS NOTE ADULT - ASSESSMENT
Assessment  DMT2: 59y Male with DM T2 with hyperglycemia, now on basal bolus insulin, increased dose, blood sugars improving, no hypoglycemic episodes. Patient is eating meals with good appetite, comfortable and alert, planning DC to rehab, pending authorization.  CAD: on medications, no chest pain, stable, monitored.  HTN: Controlled,  on antihypertensive medications.  Overweight/Obesity: No strict exercise routines, not on any weight loss program, neither on low  calorie diet.          Laury Romero MD  Cell: [phone]  Office: [phone] Assessment  DMT2: 59y Male with DM T2 with hyperglycemia, now on basal bolus insulin, increased dose, blood sugars improving, no  hypoglycemic episodes. Patient is eating meals with good appetite, comfortable and alert, planning DC to rehab, pending authorization.  CAD: on medications, no chest pain, stable, monitored.  HTN: Controlled,  on antihypertensive medications.  Overweight/Obesity: No strict exercise routines, not on any weight loss program, neither on low  calorie diet.          Laury Romero MD  Cell: [phone]  Office: [phone]

## 2020-01-21 NOTE — PROGRESS NOTE ADULT - ASSESSMENT
59M w/ COPD on 2L NC, DM2 on insulin, CAD s/p stent, HFpEF, pulm HTN, HTN, charted hx of schizophrenia/bipolar disorder, panic disorder, and recent admit for COPD exacerbation d/c 12/12/19 presents from [facility] ([facility]) for n/v/d and hyperglycemia. Admitted with diabetic ketoacidosis, found to have hyponatremia    A/P:    Hyponatremia:  Likely sec to hyperglycemia/dehydration  Also on SSRI  Optimize glucose control  Monitor Na level at present  Continue lasix at present in view of h/o CHF-Diastolic  Promote oral solute intake, avoid excessive free water intake     HTN:  Controlled   Monitor at present    Hyperkalemia  hemolyzed sample  Repeat serum K WNL

## 2020-01-22 LAB
GLUCOSE BLDC GLUCOMTR-MCNC: 155 MG/DL — HIGH (ref 70–99)
GLUCOSE BLDC GLUCOMTR-MCNC: 184 MG/DL — HIGH (ref 70–99)
GLUCOSE BLDC GLUCOMTR-MCNC: 205 MG/DL — HIGH (ref 70–99)
GLUCOSE BLDC GLUCOMTR-MCNC: 236 MG/DL — HIGH (ref 70–99)

## 2020-01-22 RX ADMIN — BUDESONIDE AND FORMOTEROL FUMARATE DIHYDRATE 2 PUFF(S): 160; 4.5 AEROSOL RESPIRATORY (INHALATION) at 05:38

## 2020-01-22 RX ADMIN — Medication 27 UNIT(S): at 08:45

## 2020-01-22 RX ADMIN — Medication 3 MILLILITER(S): at 17:19

## 2020-01-22 RX ADMIN — ATORVASTATIN CALCIUM 40 MILLIGRAM(S): 80 TABLET, FILM COATED ORAL at 22:15

## 2020-01-22 RX ADMIN — BUDESONIDE AND FORMOTEROL FUMARATE DIHYDRATE 2 PUFF(S): 160; 4.5 AEROSOL RESPIRATORY (INHALATION) at 17:19

## 2020-01-22 RX ADMIN — RISPERIDONE 0.5 MILLIGRAM(S): 4 TABLET ORAL at 13:05

## 2020-01-22 RX ADMIN — Medication 2: at 18:00

## 2020-01-22 RX ADMIN — Medication 81 MILLIGRAM(S): at 11:20

## 2020-01-22 RX ADMIN — Medication 1 EACH: at 11:20

## 2020-01-22 RX ADMIN — ENOXAPARIN SODIUM 40 MILLIGRAM(S): 100 INJECTION SUBCUTANEOUS at 11:20

## 2020-01-22 RX ADMIN — RISPERIDONE 0.5 MILLIGRAM(S): 4 TABLET ORAL at 05:38

## 2020-01-22 RX ADMIN — Medication 27 UNIT(S): at 18:00

## 2020-01-22 RX ADMIN — OXYCODONE AND ACETAMINOPHEN 2 TABLET(S): 5; 325 TABLET ORAL at 09:10

## 2020-01-22 RX ADMIN — OXYCODONE AND ACETAMINOPHEN 2 TABLET(S): 5; 325 TABLET ORAL at 15:30

## 2020-01-22 RX ADMIN — RISPERIDONE 0.5 MILLIGRAM(S): 4 TABLET ORAL at 22:15

## 2020-01-22 RX ADMIN — Medication 5 MILLIGRAM(S): at 22:15

## 2020-01-22 RX ADMIN — Medication 3 MILLILITER(S): at 05:38

## 2020-01-22 RX ADMIN — LISINOPRIL 2.5 MILLIGRAM(S): 2.5 TABLET ORAL at 05:38

## 2020-01-22 RX ADMIN — OXYCODONE AND ACETAMINOPHEN 2 TABLET(S): 5; 325 TABLET ORAL at 15:00

## 2020-01-22 RX ADMIN — Medication 27 UNIT(S): at 13:04

## 2020-01-22 RX ADMIN — Medication 20 MILLIGRAM(S): at 05:38

## 2020-01-22 RX ADMIN — PANTOPRAZOLE SODIUM 40 MILLIGRAM(S): 20 TABLET, DELAYED RELEASE ORAL at 11:20

## 2020-01-22 RX ADMIN — OXYCODONE AND ACETAMINOPHEN 2 TABLET(S): 5; 325 TABLET ORAL at 22:14

## 2020-01-22 RX ADMIN — OXYCODONE AND ACETAMINOPHEN 2 TABLET(S): 5; 325 TABLET ORAL at 22:30

## 2020-01-22 RX ADMIN — OXYCODONE AND ACETAMINOPHEN 2 TABLET(S): 5; 325 TABLET ORAL at 08:47

## 2020-01-22 RX ADMIN — Medication 1: at 13:04

## 2020-01-22 RX ADMIN — SENNA PLUS 2 TABLET(S): 8.6 TABLET ORAL at 22:15

## 2020-01-22 RX ADMIN — INSULIN GLARGINE 70 UNIT(S): 100 INJECTION, SOLUTION SUBCUTANEOUS at 22:29

## 2020-01-22 RX ADMIN — Medication 3 MILLILITER(S): at 11:20

## 2020-01-22 RX ADMIN — Medication 1: at 08:45

## 2020-01-22 RX ADMIN — SERTRALINE 50 MILLIGRAM(S): 25 TABLET, FILM COATED ORAL at 22:15

## 2020-01-22 NOTE — PROGRESS NOTE ADULT - SUBJECTIVE AND OBJECTIVE BOX
Patient is a 59y old  Male who presents with a chief complaint of 59M w/ n/v/d and hyperglycemia sent in from [facility] (22 Jan 2020 14:35)      INTERVAL HPI/OVERNIGHT EVENTS:  T(C): 36.7 (01-22-20 @ 12:39), Max: 36.8 (01-21-20 @ 20:17)  HR: 86 (01-22-20 @ 12:39) (74 - 86)  BP: 99/68 (01-22-20 @ 12:39) (99/68 - 119/78)  RR: 18 (01-22-20 @ 12:39) (18 - 18)  SpO2: 96% (01-22-20 @ 12:39) (96% - 98%)  Wt(kg): --  I&O's Summary    21 Jan 2020 07:01  -  22 Jan 2020 07:00  --------------------------------------------------------  IN: 1050 mL / OUT: 1100 mL / NET: -50 mL    22 Jan 2020 07:01  -  22 Jan 2020 18:30  --------------------------------------------------------  IN: 480 mL / OUT: 1 mL / NET: 479 mL        PAST MEDICAL & SURGICAL HISTORY:  Oxygen dependent  Gastroesophageal reflux disease, esophagitis presence not specified  Smoker  Bipolar 1 disorder  Coronary artery disease involving native coronary artery of native heart without angina pectoris  Type 2 diabetes mellitus without complication, with long-term current use of insulin  Pulmonary HTN  HLD (hyperlipidemia)  Stented coronary artery  COPD (chronic obstructive pulmonary disease)  No significant past surgical history      SOCIAL HISTORY  Alcohol:  Tobacco:  Illicit substance use:    FAMILY HISTORY:    REVIEW OF SYSTEMS:  CONSTITUTIONAL: No fever, weight loss, or fatigue  EYES: No eye pain, visual disturbances, or discharge  ENMT:  No difficulty hearing, tinnitus, vertigo; No sinus or throat pain  NECK: No pain or stiffness  RESPIRATORY: No cough, wheezing, chills or hemoptysis; No shortness of breath  CARDIOVASCULAR: No chest pain, palpitations, dizziness, or leg swelling  GASTROINTESTINAL: No abdominal or epigastric pain. No nausea, vomiting, or hematemesis; No diarrhea or constipation. No melena or hematochezia.  GENITOURINARY: No dysuria, frequency, hematuria, or incontinence  NEUROLOGICAL: No headaches, memory loss, loss of strength, numbness, or tremors  SKIN: No itching, burning, rashes, or lesions   LYMPH NODES: No enlarged glands  ENDOCRINE: No heat or cold intolerance; No hair loss  MUSCULOSKELETAL: No joint pain or swelling; No muscle, back, or extremity pain  PSYCHIATRIC: No depression, anxiety, mood swings, or difficulty sleeping  HEME/LYMPH: No easy bruising, or bleeding gums  ALLERY AND IMMUNOLOGIC: No hives or eczema    RADIOLOGY & ADDITIONAL TESTS:    Imaging Personally Reviewed:  [ ] YES  [ ] NO    Consultant(s) Notes Reviewed:  [ ] YES  [ ] NO    PHYSICAL EXAM:  GENERAL: NAD, well-groomed, well-developed  HEAD:  Atraumatic, Normocephalic  EYES: EOMI, PERRLA, conjunctiva and sclera clear  ENMT: No tonsillar erythema, exudates, or enlargement; Moist mucous membranes, Good dentition, No lesions  NECK: Supple, No JVD, Normal thyroid  NERVOUS SYSTEM:  Alert & Oriented X3, Good concentration; Motor Strength 5/5 B/L upper and lower extremities; DTRs 2+ intact and symmetric  CHEST/LUNG: Clear to percussion bilaterally; No rales, rhonchi, wheezing, or rubs  HEART: Regular rate and rhythm; No murmurs, rubs, or gallops  ABDOMEN: Soft, Nontender, Nondistended; Bowel sounds present  EXTREMITIES:  2+ Peripheral Pulses, No clubbing, cyanosis, or edema  LYMPH: No lymphadenopathy noted  SKIN: No rashes or lesions    LABS:              CAPILLARY BLOOD GLUCOSE      POCT Blood Glucose.: 205 mg/dL (22 Jan 2020 17:13)  POCT Blood Glucose.: 155 mg/dL (22 Jan 2020 12:38)  POCT Blood Glucose.: 184 mg/dL (22 Jan 2020 08:20)  POCT Blood Glucose.: 181 mg/dL (21 Jan 2020 22:07)            MEDICATIONS  (STANDING):  albuterol/ipratropium for Nebulization 3 milliLiter(s) Nebulizer every 6 hours  aspirin enteric coated 81 milliGRAM(s) Oral daily  atorvastatin 40 milliGRAM(s) Oral at bedtime  budesonide 160 MICROgram(s)/formoterol 4.5 MICROgram(s) Inhaler 2 Puff(s) Inhalation two times a day  dextrose 5%. 1000 milliLiter(s) (50 mL/Hr) IV Continuous <Continuous>  dextrose 50% Injectable 12.5 Gram(s) IV Push once  dextrose 50% Injectable 25 Gram(s) IV Push once  dextrose 50% Injectable 25 Gram(s) IV Push once  enoxaparin Injectable 40 milliGRAM(s) SubCutaneous daily  furosemide    Tablet 20 milliGRAM(s) Oral daily  insulin glargine Injectable (LANTUS) 70 Unit(s) SubCutaneous at bedtime  insulin lispro (HumaLOG) corrective regimen sliding scale   SubCutaneous three times a day before meals  insulin lispro (HumaLOG) corrective regimen sliding scale   SubCutaneous at bedtime  insulin lispro Injectable (HumaLOG) 27 Unit(s) SubCutaneous three times a day with meals  lisinopril 2.5 milliGRAM(s) Oral daily  melatonin 5 milliGRAM(s) Oral at bedtime  nicotine - Inhaler 1 Each Inhalation daily  pantoprazole    Tablet 40 milliGRAM(s) Oral daily  polyethylene glycol 3350 17 Gram(s) Oral every 12 hours  risperiDONE   Tablet 0.5 milliGRAM(s) Oral three times a day  senna 2 Tablet(s) Oral at bedtime  sertraline 50 milliGRAM(s) Oral at bedtime    MEDICATIONS  (PRN):  acetaminophen   Tablet .. 650 milliGRAM(s) Oral every 6 hours PRN Mild Pain (1 - 3)  aluminum hydroxide/magnesium hydroxide/simethicone Suspension 30 milliLiter(s) Oral every 4 hours PRN Dyspepsia  bisacodyl 5 milliGRAM(s) Oral every 12 hours PRN Constipation  dextrose 40% Gel 15 Gram(s) Oral once PRN Blood Glucose LESS THAN 70 milliGRAM(s)/deciliter  glucagon  Injectable 1 milliGRAM(s) IntraMuscular once PRN Glucose LESS THAN 70 milligrams/deciliter  oxycodone    5 mG/acetaminophen 325 mG 2 Tablet(s) Oral every 6 hours PRN Moderate Pain (4 - 6)      Care Discussed with Consultants/Other Providers [ ] YES  [ ] NO

## 2020-01-22 NOTE — PROGRESS NOTE ADULT - PROBLEM SELECTOR PLAN 1
Will continue current insulin regimen for now. Will continue monitoring FS, log, and FU.  Suggest to DC to rehab on current insulin regimen.  Patient counseled for compliance with consistent low carb diet and exercise as tolerated outpatient. Will continue current insulin regimen for now. Will continue monitoring FS, log, and FU.

## 2020-01-22 NOTE — PROGRESS NOTE ADULT - ASSESSMENT
Assessment  DMT2: 59y Male with DM T2 with hyperglycemia, now on basal bolus insulin, blood sugars much improved (, 181, 184, 155), trending within overall acceptable range, no hypoglycemic episodes. Patient is eating meals with good appetite, comfortable and alert, no complaints today. Awaiting rehab placement.  CAD: on medications, no chest pain, stable, monitored.  HTN: Controlled,  on antihypertensive medications.  Overweight/Obesity: No strict exercise routines, not on any weight loss program, neither on low  calorie diet.          Laury Romero MD  Cell: [phone]  Office: [phone] Assessment  DMT2: 59y Male with DM T2 with hyperglycemia, now on basal bolus insulin, blood sugars much improved (, 181, 184, 155),  trending within overall acceptable range, no hypoglycemic episodes. Patient is eating meals with good appetite, comfortable and alert, no complaints today. Awaiting rehab placement.  CAD: on medications, no chest pain, stable, monitored.  HTN: Controlled,  on antihypertensive medications.  Overweight/Obesity: No strict exercise routines, not on any weight loss program, neither on low  calorie diet.          Laury Romero MD  Cell: [phone]  Office: [phone]

## 2020-01-22 NOTE — PROGRESS NOTE ADULT - SUBJECTIVE AND OBJECTIVE BOX
Chief complaint  Patient is a 59y old  Male who presents with a chief complaint of 59M w/ n/v/d and hyperglycemia sent in from [facility] (21 Jan 2020 20:37)   Review of systems  Patient in bed, looks comfortable, no hypoglycemia.    Labs and Fingersticks  CAPILLARY BLOOD GLUCOSE      POCT Blood Glucose.: 155 mg/dL (22 Jan 2020 12:38)  POCT Blood Glucose.: 184 mg/dL (22 Jan 2020 08:20)  POCT Blood Glucose.: 181 mg/dL (21 Jan 2020 22:07)  POCT Blood Glucose.: 151 mg/dL (21 Jan 2020 17:02)                        Medications  MEDICATIONS  (STANDING):  albuterol/ipratropium for Nebulization 3 milliLiter(s) Nebulizer every 6 hours  aspirin enteric coated 81 milliGRAM(s) Oral daily  atorvastatin 40 milliGRAM(s) Oral at bedtime  budesonide 160 MICROgram(s)/formoterol 4.5 MICROgram(s) Inhaler 2 Puff(s) Inhalation two times a day  dextrose 5%. 1000 milliLiter(s) (50 mL/Hr) IV Continuous <Continuous>  dextrose 50% Injectable 12.5 Gram(s) IV Push once  dextrose 50% Injectable 25 Gram(s) IV Push once  dextrose 50% Injectable 25 Gram(s) IV Push once  enoxaparin Injectable 40 milliGRAM(s) SubCutaneous daily  furosemide    Tablet 20 milliGRAM(s) Oral daily  insulin glargine Injectable (LANTUS) 70 Unit(s) SubCutaneous at bedtime  insulin lispro (HumaLOG) corrective regimen sliding scale   SubCutaneous three times a day before meals  insulin lispro (HumaLOG) corrective regimen sliding scale   SubCutaneous at bedtime  insulin lispro Injectable (HumaLOG) 27 Unit(s) SubCutaneous three times a day with meals  lisinopril 2.5 milliGRAM(s) Oral daily  melatonin 5 milliGRAM(s) Oral at bedtime  nicotine - Inhaler 1 Each Inhalation daily  pantoprazole    Tablet 40 milliGRAM(s) Oral daily  polyethylene glycol 3350 17 Gram(s) Oral every 12 hours  risperiDONE   Tablet 0.5 milliGRAM(s) Oral three times a day  senna 2 Tablet(s) Oral at bedtime  sertraline 50 milliGRAM(s) Oral at bedtime      Physical Exam  General: Patient comfortable in bed  Vital Signs Last 12 Hrs  T(F): 98 (01-22-20 @ 12:39), Max: 98.3 (01-22-20 @ 05:36)  HR: 86 (01-22-20 @ 12:39) (75 - 86)  BP: 99/68 (01-22-20 @ 12:39) (99/68 - 119/78)  BP(mean): --  RR: 18 (01-22-20 @ 12:39) (18 - 18)  SpO2: 96% (01-22-20 @ 12:39) (96% - 98%)  Neck: No palpable thyroid nodules.  CVS: S1S2, No murmurs  Respiratory: No wheezing, no crepitations  GI: Abdomen soft, bowel sounds positive  Musculoskeletal:  edema lower extremities.   Skin: No skin rashes, no ecchymosis    Diagnostics Chief complaint  Patient is a 59y old  Male who presents with a chief complaint of 59M w/ n/v/d and hyperglycemia sent in from [facility] (21 Jan 2020 20:37)   Review of systems  Patient in bed, looks comfortable,  no hypoglycemia.    Labs and Fingersticks  CAPILLARY BLOOD GLUCOSE      POCT Blood Glucose.: 155 mg/dL (22 Jan 2020 12:38)  POCT Blood Glucose.: 184 mg/dL (22 Jan 2020 08:20)  POCT Blood Glucose.: 181 mg/dL (21 Jan 2020 22:07)  POCT Blood Glucose.: 151 mg/dL (21 Jan 2020 17:02)    Medications  MEDICATIONS  (STANDING):  albuterol/ipratropium for Nebulization 3 milliLiter(s) Nebulizer every 6 hours  aspirin enteric coated 81 milliGRAM(s) Oral daily  atorvastatin 40 milliGRAM(s) Oral at bedtime  budesonide 160 MICROgram(s)/formoterol 4.5 MICROgram(s) Inhaler 2 Puff(s) Inhalation two times a day  dextrose 5%. 1000 milliLiter(s) (50 mL/Hr) IV Continuous <Continuous>  dextrose 50% Injectable 12.5 Gram(s) IV Push once  dextrose 50% Injectable 25 Gram(s) IV Push once  dextrose 50% Injectable 25 Gram(s) IV Push once  enoxaparin Injectable 40 milliGRAM(s) SubCutaneous daily  furosemide    Tablet 20 milliGRAM(s) Oral daily  insulin glargine Injectable (LANTUS) 70 Unit(s) SubCutaneous at bedtime  insulin lispro (HumaLOG) corrective regimen sliding scale   SubCutaneous three times a day before meals  insulin lispro (HumaLOG) corrective regimen sliding scale   SubCutaneous at bedtime  insulin lispro Injectable (HumaLOG) 27 Unit(s) SubCutaneous three times a day with meals  lisinopril 2.5 milliGRAM(s) Oral daily  melatonin 5 milliGRAM(s) Oral at bedtime  nicotine - Inhaler 1 Each Inhalation daily  pantoprazole    Tablet 40 milliGRAM(s) Oral daily  polyethylene glycol 3350 17 Gram(s) Oral every 12 hours  risperiDONE   Tablet 0.5 milliGRAM(s) Oral three times a day  senna 2 Tablet(s) Oral at bedtime  sertraline 50 milliGRAM(s) Oral at bedtime      Physical Exam  General: Patient comfortable in bed  Vital Signs Last 12 Hrs  T(F): 98 (01-22-20 @ 12:39), Max: 98.3 (01-22-20 @ 05:36)  HR: 86 (01-22-20 @ 12:39) (75 - 86)  BP: 99/68 (01-22-20 @ 12:39) (99/68 - 119/78)  BP(mean): --  RR: 18 (01-22-20 @ 12:39) (18 - 18)  SpO2: 96% (01-22-20 @ 12:39) (96% - 98%)  Neck: No palpable thyroid nodules.  CVS: S1S2, No murmurs  Respiratory: No wheezing, no crepitations  GI: Abdomen soft, bowel sounds positive  Musculoskeletal:  edema lower extremities.   Skin: No skin rashes, no ecchymosis    Diagnostics

## 2020-01-23 LAB
GLUCOSE BLDC GLUCOMTR-MCNC: 197 MG/DL — HIGH (ref 70–99)
GLUCOSE BLDC GLUCOMTR-MCNC: 203 MG/DL — HIGH (ref 70–99)
GLUCOSE BLDC GLUCOMTR-MCNC: 207 MG/DL — HIGH (ref 70–99)
GLUCOSE BLDC GLUCOMTR-MCNC: 210 MG/DL — HIGH (ref 70–99)

## 2020-01-23 RX ADMIN — LISINOPRIL 2.5 MILLIGRAM(S): 2.5 TABLET ORAL at 06:51

## 2020-01-23 RX ADMIN — Medication 27 UNIT(S): at 17:39

## 2020-01-23 RX ADMIN — Medication 3 MILLILITER(S): at 11:30

## 2020-01-23 RX ADMIN — Medication 3 MILLILITER(S): at 06:51

## 2020-01-23 RX ADMIN — Medication 1: at 12:33

## 2020-01-23 RX ADMIN — OXYCODONE AND ACETAMINOPHEN 2 TABLET(S): 5; 325 TABLET ORAL at 09:00

## 2020-01-23 RX ADMIN — Medication 2: at 08:27

## 2020-01-23 RX ADMIN — OXYCODONE AND ACETAMINOPHEN 2 TABLET(S): 5; 325 TABLET ORAL at 15:42

## 2020-01-23 RX ADMIN — Medication 5 MILLIGRAM(S): at 21:01

## 2020-01-23 RX ADMIN — OXYCODONE AND ACETAMINOPHEN 2 TABLET(S): 5; 325 TABLET ORAL at 08:31

## 2020-01-23 RX ADMIN — Medication 3 MILLILITER(S): at 17:37

## 2020-01-23 RX ADMIN — BUDESONIDE AND FORMOTEROL FUMARATE DIHYDRATE 2 PUFF(S): 160; 4.5 AEROSOL RESPIRATORY (INHALATION) at 17:37

## 2020-01-23 RX ADMIN — Medication 2: at 17:39

## 2020-01-23 RX ADMIN — ATORVASTATIN CALCIUM 40 MILLIGRAM(S): 80 TABLET, FILM COATED ORAL at 21:01

## 2020-01-23 RX ADMIN — RISPERIDONE 0.5 MILLIGRAM(S): 4 TABLET ORAL at 21:01

## 2020-01-23 RX ADMIN — OXYCODONE AND ACETAMINOPHEN 2 TABLET(S): 5; 325 TABLET ORAL at 16:20

## 2020-01-23 RX ADMIN — Medication 20 MILLIGRAM(S): at 06:51

## 2020-01-23 RX ADMIN — INSULIN GLARGINE 70 UNIT(S): 100 INJECTION, SOLUTION SUBCUTANEOUS at 22:09

## 2020-01-23 RX ADMIN — Medication 27 UNIT(S): at 12:34

## 2020-01-23 RX ADMIN — ENOXAPARIN SODIUM 40 MILLIGRAM(S): 100 INJECTION SUBCUTANEOUS at 11:30

## 2020-01-23 RX ADMIN — PANTOPRAZOLE SODIUM 40 MILLIGRAM(S): 20 TABLET, DELAYED RELEASE ORAL at 11:30

## 2020-01-23 RX ADMIN — BUDESONIDE AND FORMOTEROL FUMARATE DIHYDRATE 2 PUFF(S): 160; 4.5 AEROSOL RESPIRATORY (INHALATION) at 06:52

## 2020-01-23 RX ADMIN — Medication 81 MILLIGRAM(S): at 11:30

## 2020-01-23 RX ADMIN — SERTRALINE 50 MILLIGRAM(S): 25 TABLET, FILM COATED ORAL at 21:02

## 2020-01-23 RX ADMIN — OXYCODONE AND ACETAMINOPHEN 2 TABLET(S): 5; 325 TABLET ORAL at 22:01

## 2020-01-23 RX ADMIN — RISPERIDONE 0.5 MILLIGRAM(S): 4 TABLET ORAL at 06:52

## 2020-01-23 RX ADMIN — Medication 27 UNIT(S): at 08:27

## 2020-01-23 RX ADMIN — RISPERIDONE 0.5 MILLIGRAM(S): 4 TABLET ORAL at 15:38

## 2020-01-23 NOTE — PROGRESS NOTE ADULT - PROBLEM SELECTOR PLAN 1
Will continue current insulin regimen for now. Will continue monitoring FS, log, and FU.  Suggest to DC to rehab on current insulin regimen and FU endo.  Patient counseled for compliance with consistent low carb diet and exercise as tolerated outpatient. Will continue current insulin regimen for now. Will continue monitoring FS, log, and FU.

## 2020-01-23 NOTE — PROGRESS NOTE ADULT - ASSESSMENT
Assessment  DMT2: 59y Male with DM T2 with hyperglycemia, A1C 10.4%, on basal bolus insulin, blood sugars improving though still running high, no hypoglycemic episodes. Patient is eating full meals with good appetite, alert, resting comfortably in bed without complaints. Awaiting rehab placement.  CAD: on medications, no chest pain, stable, monitored.  HTN: Controlled,  on antihypertensive medications.  Overweight/Obesity: No strict exercise routines, not on any weight loss program, neither on low  calorie diet.          Laury Romero MD  Cell: [phone]  Office: [phone] Assessment  DMT2: 59y Male with DM T2 with hyperglycemia, A1C 10.4%, on basal bolus insulin, blood sugars improving though still running high, no hypoglycemic episodes. Patient is eating full meals with good appetite, alert,  resting comfortably in bed without complaints. Awaiting rehab placement.  CAD: on medications, no chest pain, stable, monitored.  HTN: Controlled,  on antihypertensive medications.  Overweight/Obesity: No strict exercise routines, not on any weight loss program, neither on low  calorie diet.          Laury Romero MD  Cell: [phone]  Office: [phone]

## 2020-01-23 NOTE — PROGRESS NOTE ADULT - SUBJECTIVE AND OBJECTIVE BOX
Chief complaint  Patient is a 59y old  Male who presents with a chief complaint of 59M w/ n/v/d and hyperglycemia sent in from [facility] (22 Jan 2020 18:30)   Review of systems  Patient in bed, looks comfortable, no hypoglycemia.    Labs and Fingersticks  CAPILLARY BLOOD GLUCOSE      POCT Blood Glucose.: 197 mg/dL (23 Jan 2020 12:13)  POCT Blood Glucose.: 210 mg/dL (23 Jan 2020 08:14)  POCT Blood Glucose.: 236 mg/dL (22 Jan 2020 21:44)  POCT Blood Glucose.: 205 mg/dL (22 Jan 2020 17:13)                        Medications  MEDICATIONS  (STANDING):  albuterol/ipratropium for Nebulization 3 milliLiter(s) Nebulizer every 6 hours  aspirin enteric coated 81 milliGRAM(s) Oral daily  atorvastatin 40 milliGRAM(s) Oral at bedtime  budesonide 160 MICROgram(s)/formoterol 4.5 MICROgram(s) Inhaler 2 Puff(s) Inhalation two times a day  dextrose 5%. 1000 milliLiter(s) (50 mL/Hr) IV Continuous <Continuous>  dextrose 50% Injectable 12.5 Gram(s) IV Push once  dextrose 50% Injectable 25 Gram(s) IV Push once  dextrose 50% Injectable 25 Gram(s) IV Push once  enoxaparin Injectable 40 milliGRAM(s) SubCutaneous daily  furosemide    Tablet 20 milliGRAM(s) Oral daily  insulin glargine Injectable (LANTUS) 70 Unit(s) SubCutaneous at bedtime  insulin lispro (HumaLOG) corrective regimen sliding scale   SubCutaneous three times a day before meals  insulin lispro (HumaLOG) corrective regimen sliding scale   SubCutaneous at bedtime  insulin lispro Injectable (HumaLOG) 27 Unit(s) SubCutaneous three times a day with meals  lisinopril 2.5 milliGRAM(s) Oral daily  melatonin 5 milliGRAM(s) Oral at bedtime  nicotine - Inhaler 1 Each Inhalation daily  pantoprazole    Tablet 40 milliGRAM(s) Oral daily  polyethylene glycol 3350 17 Gram(s) Oral every 12 hours  risperiDONE   Tablet 0.5 milliGRAM(s) Oral three times a day  senna 2 Tablet(s) Oral at bedtime  sertraline 50 milliGRAM(s) Oral at bedtime      Physical Exam  General: Patient comfortable in bed  Vital Signs Last 12 Hrs  T(F): 97.7 (01-23-20 @ 12:16), Max: 97.9 (01-23-20 @ 04:49)  HR: 69 (01-23-20 @ 12:16) (69 - 70)  BP: 111/79 (01-23-20 @ 12:16) (103/71 - 111/79)  BP(mean): --  RR: 18 (01-23-20 @ 12:16) (18 - 20)  SpO2: 97% (01-23-20 @ 12:16) (96% - 97%)  Neck: No palpable thyroid nodules.  CVS: S1S2, No murmurs  Respiratory: No wheezing, no crepitations  GI: Abdomen soft, bowel sounds positive  Musculoskeletal:  edema lower extremities.   Skin: No skin rashes, no ecchymosis    Diagnostics Chief complaint  Patient is a 59y old  Male who presents with a  chief complaint of 59M w/ n/v/d and hyperglycemia sent in from [facility] (22 Jan 2020 18:30)   Review of systems  Patient in bed, looks comfortable, no hypoglycemia.    Labs and Fingersticks  CAPILLARY BLOOD GLUCOSE      POCT Blood Glucose.: 197 mg/dL (23 Jan 2020 12:13)  POCT Blood Glucose.: 210 mg/dL (23 Jan 2020 08:14)  POCT Blood Glucose.: 236 mg/dL (22 Jan 2020 21:44)  POCT Blood Glucose.: 205 mg/dL (22 Jan 2020 17:13)                        Medications  MEDICATIONS  (STANDING):  albuterol/ipratropium for Nebulization 3 milliLiter(s) Nebulizer every 6 hours  aspirin enteric coated 81 milliGRAM(s) Oral daily  atorvastatin 40 milliGRAM(s) Oral at bedtime  budesonide 160 MICROgram(s)/formoterol 4.5 MICROgram(s) Inhaler 2 Puff(s) Inhalation two times a day  dextrose 5%. 1000 milliLiter(s) (50 mL/Hr) IV Continuous <Continuous>  dextrose 50% Injectable 12.5 Gram(s) IV Push once  dextrose 50% Injectable 25 Gram(s) IV Push once  dextrose 50% Injectable 25 Gram(s) IV Push once  enoxaparin Injectable 40 milliGRAM(s) SubCutaneous daily  furosemide    Tablet 20 milliGRAM(s) Oral daily  insulin glargine Injectable (LANTUS) 70 Unit(s) SubCutaneous at bedtime  insulin lispro (HumaLOG) corrective regimen sliding scale   SubCutaneous three times a day before meals  insulin lispro (HumaLOG) corrective regimen sliding scale   SubCutaneous at bedtime  insulin lispro Injectable (HumaLOG) 27 Unit(s) SubCutaneous three times a day with meals  lisinopril 2.5 milliGRAM(s) Oral daily  melatonin 5 milliGRAM(s) Oral at bedtime  nicotine - Inhaler 1 Each Inhalation daily  pantoprazole    Tablet 40 milliGRAM(s) Oral daily  polyethylene glycol 3350 17 Gram(s) Oral every 12 hours  risperiDONE   Tablet 0.5 milliGRAM(s) Oral three times a day  senna 2 Tablet(s) Oral at bedtime  sertraline 50 milliGRAM(s) Oral at bedtime      Physical Exam  General: Patient comfortable in bed  Vital Signs Last 12 Hrs  T(F): 97.7 (01-23-20 @ 12:16), Max: 97.9 (01-23-20 @ 04:49)  HR: 69 (01-23-20 @ 12:16) (69 - 70)  BP: 111/79 (01-23-20 @ 12:16) (103/71 - 111/79)  BP(mean): --  RR: 18 (01-23-20 @ 12:16) (18 - 20)  SpO2: 97% (01-23-20 @ 12:16) (96% - 97%)  Neck: No palpable thyroid nodules.  CVS: S1S2, No murmurs  Respiratory: No wheezing, no crepitations  GI: Abdomen soft, bowel sounds positive  Musculoskeletal:  edema lower extremities.   Skin: No skin rashes, no ecchymosis    Diagnostics

## 2020-01-23 NOTE — PROGRESS NOTE ADULT - SUBJECTIVE AND OBJECTIVE BOX
Patient is a 59y old  Male who presents with a chief complaint of 59M w/ n/v/d and hyperglycemia sent in from [facility] (24 Jan 2020 01:49)    no c/o    INTERVAL HPI/OVERNIGHT EVENTS:  T(C): 36.5 (01-23-20 @ 20:18), Max: 36.6 (01-23-20 @ 04:49)  HR: 74 (01-23-20 @ 20:18) (69 - 74)  BP: 108/73 (01-23-20 @ 20:18) (103/71 - 111/79)  RR: 18 (01-23-20 @ 20:18) (18 - 20)  SpO2: 97% (01-23-20 @ 20:18) (96% - 97%)  Wt(kg): --  I&O's Summary    22 Jan 2020 07:01  -  23 Jan 2020 07:00  --------------------------------------------------------  IN: 1090 mL / OUT: 951 mL / NET: 139 mL    23 Jan 2020 07:01  -  24 Jan 2020 01:50  --------------------------------------------------------  IN: 1200 mL / OUT: 1150 mL / NET: 50 mL        PAST MEDICAL & SURGICAL HISTORY:  Oxygen dependent  Gastroesophageal reflux disease, esophagitis presence not specified  Smoker  Bipolar 1 disorder  Coronary artery disease involving native coronary artery of native heart without angina pectoris  Type 2 diabetes mellitus without complication, with long-term current use of insulin  Pulmonary HTN  HLD (hyperlipidemia)  Stented coronary artery  COPD (chronic obstructive pulmonary disease)  No significant past surgical history      SOCIAL HISTORY  Alcohol:  Tobacco:  Illicit substance use:    FAMILY HISTORY:    REVIEW OF SYSTEMS:  CONSTITUTIONAL: No fever, weight loss, or fatigue  EYES: No eye pain, visual disturbances, or discharge  ENMT:  No difficulty hearing, tinnitus, vertigo; No sinus or throat pain  NECK: No pain or stiffness  RESPIRATORY: No cough, wheezing, chills or hemoptysis; No shortness of breath  CARDIOVASCULAR: No chest pain, palpitations, dizziness, or leg swelling  GASTROINTESTINAL: No abdominal or epigastric pain. No nausea, vomiting, or hematemesis; No diarrhea or constipation. No melena or hematochezia.  GENITOURINARY: No dysuria, frequency, hematuria, or incontinence  NEUROLOGICAL: No headaches, memory loss, loss of strength, numbness, or tremors  SKIN: No itching, burning, rashes, or lesions   LYMPH NODES: No enlarged glands  ENDOCRINE: No heat or cold intolerance; No hair loss  MUSCULOSKELETAL: No joint pain or swelling; No muscle, back, or extremity pain  PSYCHIATRIC: No depression, anxiety, mood swings, or difficulty sleeping  HEME/LYMPH: No easy bruising, or bleeding gums  ALLERY AND IMMUNOLOGIC: No hives or eczema    RADIOLOGY & ADDITIONAL TESTS:    Imaging Personally Reviewed:  [ ] YES  [ ] NO    Consultant(s) Notes Reviewed:  [ ] YES  [ ] NO    PHYSICAL EXAM:  GENERAL: NAD, well-groomed, well-developed  HEAD:  Atraumatic, Normocephalic  EYES: EOMI, PERRLA, conjunctiva and sclera clear  ENMT: No tonsillar erythema, exudates, or enlargement; Moist mucous membranes, Good dentition, No lesions  NECK: Supple, No JVD, Normal thyroid  NERVOUS SYSTEM:  Alert & Oriented X3, Good concentration; Motor Strength 5/5 B/L upper and lower extremities; DTRs 2+ intact and symmetric  CHEST/LUNG: Clear to percussion bilaterally; No rales, rhonchi, wheezing, or rubs  HEART: Regular rate and rhythm; No murmurs, rubs, or gallops  ABDOMEN: Soft, Nontender, Nondistended; Bowel sounds present  EXTREMITIES:  2+ Peripheral Pulses, No clubbing, cyanosis, or edema  LYMPH: No lymphadenopathy noted  SKIN: No rashes or lesions    LABS:              CAPILLARY BLOOD GLUCOSE      POCT Blood Glucose.: 207 mg/dL (23 Jan 2020 22:05)  POCT Blood Glucose.: 203 mg/dL (23 Jan 2020 17:21)  POCT Blood Glucose.: 197 mg/dL (23 Jan 2020 12:13)  POCT Blood Glucose.: 210 mg/dL (23 Jan 2020 08:14)            MEDICATIONS  (STANDING):  albuterol/ipratropium for Nebulization 3 milliLiter(s) Nebulizer every 6 hours  aspirin enteric coated 81 milliGRAM(s) Oral daily  atorvastatin 40 milliGRAM(s) Oral at bedtime  budesonide 160 MICROgram(s)/formoterol 4.5 MICROgram(s) Inhaler 2 Puff(s) Inhalation two times a day  dextrose 5%. 1000 milliLiter(s) (50 mL/Hr) IV Continuous <Continuous>  dextrose 50% Injectable 12.5 Gram(s) IV Push once  dextrose 50% Injectable 25 Gram(s) IV Push once  dextrose 50% Injectable 25 Gram(s) IV Push once  enoxaparin Injectable 40 milliGRAM(s) SubCutaneous daily  furosemide    Tablet 20 milliGRAM(s) Oral daily  insulin glargine Injectable (LANTUS) 70 Unit(s) SubCutaneous at bedtime  insulin lispro (HumaLOG) corrective regimen sliding scale   SubCutaneous three times a day before meals  insulin lispro (HumaLOG) corrective regimen sliding scale   SubCutaneous at bedtime  insulin lispro Injectable (HumaLOG) 27 Unit(s) SubCutaneous three times a day with meals  lisinopril 2.5 milliGRAM(s) Oral daily  melatonin 5 milliGRAM(s) Oral at bedtime  nicotine - Inhaler 1 Each Inhalation daily  pantoprazole    Tablet 40 milliGRAM(s) Oral daily  polyethylene glycol 3350 17 Gram(s) Oral every 12 hours  risperiDONE   Tablet 0.5 milliGRAM(s) Oral three times a day  senna 2 Tablet(s) Oral at bedtime  sertraline 50 milliGRAM(s) Oral at bedtime    MEDICATIONS  (PRN):  acetaminophen   Tablet .. 650 milliGRAM(s) Oral every 6 hours PRN Mild Pain (1 - 3)  aluminum hydroxide/magnesium hydroxide/simethicone Suspension 30 milliLiter(s) Oral every 4 hours PRN Dyspepsia  bisacodyl 5 milliGRAM(s) Oral every 12 hours PRN Constipation  dextrose 40% Gel 15 Gram(s) Oral once PRN Blood Glucose LESS THAN 70 milliGRAM(s)/deciliter  glucagon  Injectable 1 milliGRAM(s) IntraMuscular once PRN Glucose LESS THAN 70 milligrams/deciliter  oxycodone    5 mG/acetaminophen 325 mG 2 Tablet(s) Oral every 6 hours PRN Moderate Pain (4 - 6)      Care Discussed with Consultants/Other Providers [ ] YES  [ ] NO

## 2020-01-24 LAB
GLUCOSE BLDC GLUCOMTR-MCNC: 217 MG/DL — HIGH (ref 70–99)
GLUCOSE BLDC GLUCOMTR-MCNC: 221 MG/DL — HIGH (ref 70–99)
GLUCOSE BLDC GLUCOMTR-MCNC: 231 MG/DL — HIGH (ref 70–99)
GLUCOSE BLDC GLUCOMTR-MCNC: 272 MG/DL — HIGH (ref 70–99)
GLUCOSE BLDC GLUCOMTR-MCNC: 300 MG/DL — HIGH (ref 70–99)

## 2020-01-24 RX ADMIN — RISPERIDONE 0.5 MILLIGRAM(S): 4 TABLET ORAL at 05:51

## 2020-01-24 RX ADMIN — BUDESONIDE AND FORMOTEROL FUMARATE DIHYDRATE 2 PUFF(S): 160; 4.5 AEROSOL RESPIRATORY (INHALATION) at 17:44

## 2020-01-24 RX ADMIN — Medication 2: at 17:43

## 2020-01-24 RX ADMIN — ATORVASTATIN CALCIUM 40 MILLIGRAM(S): 80 TABLET, FILM COATED ORAL at 21:38

## 2020-01-24 RX ADMIN — OXYCODONE AND ACETAMINOPHEN 2 TABLET(S): 5; 325 TABLET ORAL at 12:44

## 2020-01-24 RX ADMIN — Medication 81 MILLIGRAM(S): at 13:18

## 2020-01-24 RX ADMIN — BUDESONIDE AND FORMOTEROL FUMARATE DIHYDRATE 2 PUFF(S): 160; 4.5 AEROSOL RESPIRATORY (INHALATION) at 05:51

## 2020-01-24 RX ADMIN — ENOXAPARIN SODIUM 40 MILLIGRAM(S): 100 INJECTION SUBCUTANEOUS at 13:19

## 2020-01-24 RX ADMIN — Medication 20 MILLIGRAM(S): at 05:58

## 2020-01-24 RX ADMIN — OXYCODONE AND ACETAMINOPHEN 2 TABLET(S): 5; 325 TABLET ORAL at 18:56

## 2020-01-24 RX ADMIN — Medication 650 MILLIGRAM(S): at 22:09

## 2020-01-24 RX ADMIN — Medication 27 UNIT(S): at 13:33

## 2020-01-24 RX ADMIN — SERTRALINE 50 MILLIGRAM(S): 25 TABLET, FILM COATED ORAL at 21:39

## 2020-01-24 RX ADMIN — Medication 2: at 08:57

## 2020-01-24 RX ADMIN — RISPERIDONE 0.5 MILLIGRAM(S): 4 TABLET ORAL at 21:38

## 2020-01-24 RX ADMIN — OXYCODONE AND ACETAMINOPHEN 2 TABLET(S): 5; 325 TABLET ORAL at 06:30

## 2020-01-24 RX ADMIN — OXYCODONE AND ACETAMINOPHEN 2 TABLET(S): 5; 325 TABLET ORAL at 18:26

## 2020-01-24 RX ADMIN — RISPERIDONE 0.5 MILLIGRAM(S): 4 TABLET ORAL at 13:18

## 2020-01-24 RX ADMIN — Medication 3: at 13:34

## 2020-01-24 RX ADMIN — INSULIN GLARGINE 70 UNIT(S): 100 INJECTION, SOLUTION SUBCUTANEOUS at 22:39

## 2020-01-24 RX ADMIN — Medication 3 MILLILITER(S): at 17:44

## 2020-01-24 RX ADMIN — Medication 650 MILLIGRAM(S): at 21:39

## 2020-01-24 RX ADMIN — Medication 5 MILLIGRAM(S): at 21:39

## 2020-01-24 RX ADMIN — Medication 3 MILLILITER(S): at 05:50

## 2020-01-24 RX ADMIN — Medication 3 MILLILITER(S): at 12:15

## 2020-01-24 RX ADMIN — PANTOPRAZOLE SODIUM 40 MILLIGRAM(S): 20 TABLET, DELAYED RELEASE ORAL at 13:19

## 2020-01-24 RX ADMIN — OXYCODONE AND ACETAMINOPHEN 2 TABLET(S): 5; 325 TABLET ORAL at 12:14

## 2020-01-24 RX ADMIN — LISINOPRIL 2.5 MILLIGRAM(S): 2.5 TABLET ORAL at 05:58

## 2020-01-24 RX ADMIN — OXYCODONE AND ACETAMINOPHEN 2 TABLET(S): 5; 325 TABLET ORAL at 05:57

## 2020-01-24 RX ADMIN — Medication 27 UNIT(S): at 08:57

## 2020-01-24 RX ADMIN — Medication 27 UNIT(S): at 17:43

## 2020-01-24 NOTE — PROGRESS NOTE ADULT - ASSESSMENT
Assessment  DMT2: 59y Male with DM T2 with hyperglycemia, A1C 10.4%, on basal bolus insulin, increased dose, blood sugars still running high, no hypoglycemic episodes. Patient is eating full meals with good appetite, alert, resting comfortably in bed without complaints. Cleared for DC, awaiting rehab placement.  CAD: on medications, no chest pain, stable, monitored.  HTN: Controlled,  on antihypertensive medications.  Overweight/Obesity: No strict exercise routines, not on any weight loss program, neither on low  calorie diet.          Laury Romero MD  Cell: [phone]  Office: [phone] Assessment  DMT2: 59y Male with DM T2 with hyperglycemia, A1C 10.4%, on basal bolus insulin, increased dose,  blood sugars still running high, no hypoglycemic episodes. Patient is eating full meals with good appetite, alert, resting comfortably in bed without complaints. Cleared for DC, awaiting rehab placement.  CAD: on medications, no chest pain, stable, monitored.  HTN: Controlled,  on antihypertensive medications.  Overweight/Obesity: No strict exercise routines, not on any weight loss program, neither on low  calorie diet.          Laury Romero MD  Cell: [phone]  Office: [phone]

## 2020-01-24 NOTE — PROGRESS NOTE ADULT - PROBLEM SELECTOR PLAN 2
On medications,  no chest pain, stable, monitored and followed up by primary cardiothoracic team/cardiology team. On medications,  no chest pain, stable, monitored and followed up by primary  team/cardiology team.

## 2020-01-24 NOTE — PROGRESS NOTE ADULT - SUBJECTIVE AND OBJECTIVE BOX
Chief complaint  Patient is a 59y old  Male who presents with a chief complaint of 59M w/ n/v/d and hyperglycemia sent in from [facility] (24 Jan 2020 01:49)   Review of systems  Patient in bed, looks comfortable, no hypoglycemia.    Labs and Fingersticks  CAPILLARY BLOOD GLUCOSE      POCT Blood Glucose.: 272 mg/dL (24 Jan 2020 12:17)  POCT Blood Glucose.: 217 mg/dL (24 Jan 2020 08:11)  POCT Blood Glucose.: 207 mg/dL (23 Jan 2020 22:05)  POCT Blood Glucose.: 203 mg/dL (23 Jan 2020 17:21)                        Medications  MEDICATIONS  (STANDING):  albuterol/ipratropium for Nebulization 3 milliLiter(s) Nebulizer every 6 hours  aspirin enteric coated 81 milliGRAM(s) Oral daily  atorvastatin 40 milliGRAM(s) Oral at bedtime  budesonide 160 MICROgram(s)/formoterol 4.5 MICROgram(s) Inhaler 2 Puff(s) Inhalation two times a day  dextrose 5%. 1000 milliLiter(s) (50 mL/Hr) IV Continuous <Continuous>  dextrose 50% Injectable 12.5 Gram(s) IV Push once  dextrose 50% Injectable 25 Gram(s) IV Push once  dextrose 50% Injectable 25 Gram(s) IV Push once  enoxaparin Injectable 40 milliGRAM(s) SubCutaneous daily  furosemide    Tablet 20 milliGRAM(s) Oral daily  insulin glargine Injectable (LANTUS) 70 Unit(s) SubCutaneous at bedtime  insulin lispro (HumaLOG) corrective regimen sliding scale   SubCutaneous three times a day before meals  insulin lispro (HumaLOG) corrective regimen sliding scale   SubCutaneous at bedtime  insulin lispro Injectable (HumaLOG) 27 Unit(s) SubCutaneous three times a day with meals  lisinopril 2.5 milliGRAM(s) Oral daily  melatonin 5 milliGRAM(s) Oral at bedtime  nicotine - Inhaler 1 Each Inhalation daily  pantoprazole    Tablet 40 milliGRAM(s) Oral daily  polyethylene glycol 3350 17 Gram(s) Oral every 12 hours  risperiDONE   Tablet 0.5 milliGRAM(s) Oral three times a day  senna 2 Tablet(s) Oral at bedtime  sertraline 50 milliGRAM(s) Oral at bedtime      Physical Exam  General: Patient comfortable in bed  Vital Signs Last 12 Hrs  T(F): 97.5 (01-24-20 @ 12:23), Max: 97.5 (01-24-20 @ 04:51)  HR: 83 (01-24-20 @ 12:23) (70 - 83)  BP: 101/68 (01-24-20 @ 12:23) (94/63 - 108/62)  BP(mean): --  RR: 18 (01-24-20 @ 12:23) (18 - 18)  SpO2: 94% (01-24-20 @ 12:23) (94% - 95%)  Neck: No palpable thyroid nodules.  CVS: S1S2, No murmurs  Respiratory: No wheezing, no crepitations  GI: Abdomen soft, bowel sounds positive  Musculoskeletal:  edema lower extremities.   Skin: No skin rashes, no ecchymosis    Diagnostics Chief complaint  Patient is a 59y old  Male who presents with a chief complaint of 59M w/ n/v/d and hyperglycemia sent in from [facility] (24 Jan 2020 01:49)   Review of systems  Patient in bed, looks comfortable,  no hypoglycemia.    Labs and Fingersticks  CAPILLARY BLOOD GLUCOSE      POCT Blood Glucose.: 272 mg/dL (24 Jan 2020 12:17)  POCT Blood Glucose.: 217 mg/dL (24 Jan 2020 08:11)  POCT Blood Glucose.: 207 mg/dL (23 Jan 2020 22:05)  POCT Blood Glucose.: 203 mg/dL (23 Jan 2020 17:21)    Medications  MEDICATIONS  (STANDING):  albuterol/ipratropium for Nebulization 3 milliLiter(s) Nebulizer every 6 hours  aspirin enteric coated 81 milliGRAM(s) Oral daily  atorvastatin 40 milliGRAM(s) Oral at bedtime  budesonide 160 MICROgram(s)/formoterol 4.5 MICROgram(s) Inhaler 2 Puff(s) Inhalation two times a day  dextrose 5%. 1000 milliLiter(s) (50 mL/Hr) IV Continuous <Continuous>  dextrose 50% Injectable 12.5 Gram(s) IV Push once  dextrose 50% Injectable 25 Gram(s) IV Push once  dextrose 50% Injectable 25 Gram(s) IV Push once  enoxaparin Injectable 40 milliGRAM(s) SubCutaneous daily  furosemide    Tablet 20 milliGRAM(s) Oral daily  insulin glargine Injectable (LANTUS) 70 Unit(s) SubCutaneous at bedtime  insulin lispro (HumaLOG) corrective regimen sliding scale   SubCutaneous three times a day before meals  insulin lispro (HumaLOG) corrective regimen sliding scale   SubCutaneous at bedtime  insulin lispro Injectable (HumaLOG) 27 Unit(s) SubCutaneous three times a day with meals  lisinopril 2.5 milliGRAM(s) Oral daily  melatonin 5 milliGRAM(s) Oral at bedtime  nicotine - Inhaler 1 Each Inhalation daily  pantoprazole    Tablet 40 milliGRAM(s) Oral daily  polyethylene glycol 3350 17 Gram(s) Oral every 12 hours  risperiDONE   Tablet 0.5 milliGRAM(s) Oral three times a day  senna 2 Tablet(s) Oral at bedtime  sertraline 50 milliGRAM(s) Oral at bedtime      Physical Exam  General: Patient comfortable in bed  Vital Signs Last 12 Hrs  T(F): 97.5 (01-24-20 @ 12:23), Max: 97.5 (01-24-20 @ 04:51)  HR: 83 (01-24-20 @ 12:23) (70 - 83)  BP: 101/68 (01-24-20 @ 12:23) (94/63 - 108/62)  BP(mean): --  RR: 18 (01-24-20 @ 12:23) (18 - 18)  SpO2: 94% (01-24-20 @ 12:23) (94% - 95%)  Neck: No palpable thyroid nodules.  CVS: S1S2, No murmurs  Respiratory: No wheezing, no crepitations  GI: Abdomen soft, bowel sounds positive  Musculoskeletal:  edema lower extremities.   Skin: No skin rashes, no ecchymosis    Diagnostics

## 2020-01-24 NOTE — PROGRESS NOTE ADULT - SUBJECTIVE AND OBJECTIVE BOX
Patient is a 59y old  Male who presents with a chief complaint of 59M w/ n/v/d and hyperglycemia sent in from [facility] (23 Jan 2020 12:45)    pt. seen and examined, no c/o    INTERVAL HPI/OVERNIGHT EVENTS:  T(C): 36.5 (01-23-20 @ 20:18), Max: 36.6 (01-23-20 @ 04:49)  HR: 74 (01-23-20 @ 20:18) (69 - 74)  BP: 108/73 (01-23-20 @ 20:18) (103/71 - 111/79)  RR: 18 (01-23-20 @ 20:18) (18 - 20)  SpO2: 97% (01-23-20 @ 20:18) (96% - 97%)  Wt(kg): --  I&O's Summary    22 Jan 2020 07:01  -  23 Jan 2020 07:00  --------------------------------------------------------  IN: 1090 mL / OUT: 951 mL / NET: 139 mL    23 Jan 2020 07:01  -  24 Jan 2020 01:49  --------------------------------------------------------  IN: 1200 mL / OUT: 1150 mL / NET: 50 mL        PAST MEDICAL & SURGICAL HISTORY:  Oxygen dependent  Gastroesophageal reflux disease, esophagitis presence not specified  Smoker  Bipolar 1 disorder  Coronary artery disease involving native coronary artery of native heart without angina pectoris  Type 2 diabetes mellitus without complication, with long-term current use of insulin  Pulmonary HTN  HLD (hyperlipidemia)  Stented coronary artery  COPD (chronic obstructive pulmonary disease)  No significant past surgical history      SOCIAL HISTORY  Alcohol:  Tobacco:  Illicit substance use:    FAMILY HISTORY:    REVIEW OF SYSTEMS:  CONSTITUTIONAL: No fever, weight loss, or fatigue  EYES: No eye pain, visual disturbances, or discharge  ENMT:  No difficulty hearing, tinnitus, vertigo; No sinus or throat pain  NECK: No pain or stiffness  RESPIRATORY: No cough, wheezing, chills or hemoptysis; No shortness of breath  CARDIOVASCULAR: No chest pain, palpitations, dizziness, or leg swelling  GASTROINTESTINAL: No abdominal or epigastric pain. No nausea, vomiting, or hematemesis; No diarrhea or constipation. No melena or hematochezia.  GENITOURINARY: No dysuria, frequency, hematuria, or incontinence  NEUROLOGICAL: No headaches, memory loss, loss of strength, numbness, or tremors  SKIN: No itching, burning, rashes, or lesions   LYMPH NODES: No enlarged glands  ENDOCRINE: No heat or cold intolerance; No hair loss  MUSCULOSKELETAL: No joint pain or swelling; No muscle, back, or extremity pain  PSYCHIATRIC: No depression, anxiety, mood swings, or difficulty sleeping  HEME/LYMPH: No easy bruising, or bleeding gums  ALLERY AND IMMUNOLOGIC: No hives or eczema    RADIOLOGY & ADDITIONAL TESTS:    Imaging Personally Reviewed:  [ ] YES  [ ] NO    Consultant(s) Notes Reviewed:  [ ] YES  [ ] NO    PHYSICAL EXAM:  GENERAL: NAD, well-groomed, well-developed  HEAD:  Atraumatic, Normocephalic  EYES: EOMI, PERRLA, conjunctiva and sclera clear  ENMT: No tonsillar erythema, exudates, or enlargement; Moist mucous membranes, Good dentition, No lesions  NECK: Supple, No JVD, Normal thyroid  NERVOUS SYSTEM:  Alert & Oriented X3, Good concentration; Motor Strength 5/5 B/L upper and lower extremities; DTRs 2+ intact and symmetric  CHEST/LUNG: Clear to percussion bilaterally; No rales, rhonchi, wheezing, or rubs  HEART: Regular rate and rhythm; No murmurs, rubs, or gallops  ABDOMEN: Soft, Nontender, Nondistended; Bowel sounds present  EXTREMITIES:  2+ Peripheral Pulses, No clubbing, cyanosis, or edema  LYMPH: No lymphadenopathy noted  SKIN: No rashes or lesions    LABS:              CAPILLARY BLOOD GLUCOSE      POCT Blood Glucose.: 207 mg/dL (23 Jan 2020 22:05)  POCT Blood Glucose.: 203 mg/dL (23 Jan 2020 17:21)  POCT Blood Glucose.: 197 mg/dL (23 Jan 2020 12:13)  POCT Blood Glucose.: 210 mg/dL (23 Jan 2020 08:14)            MEDICATIONS  (STANDING):  albuterol/ipratropium for Nebulization 3 milliLiter(s) Nebulizer every 6 hours  aspirin enteric coated 81 milliGRAM(s) Oral daily  atorvastatin 40 milliGRAM(s) Oral at bedtime  budesonide 160 MICROgram(s)/formoterol 4.5 MICROgram(s) Inhaler 2 Puff(s) Inhalation two times a day  dextrose 5%. 1000 milliLiter(s) (50 mL/Hr) IV Continuous <Continuous>  dextrose 50% Injectable 12.5 Gram(s) IV Push once  dextrose 50% Injectable 25 Gram(s) IV Push once  dextrose 50% Injectable 25 Gram(s) IV Push once  enoxaparin Injectable 40 milliGRAM(s) SubCutaneous daily  furosemide    Tablet 20 milliGRAM(s) Oral daily  insulin glargine Injectable (LANTUS) 70 Unit(s) SubCutaneous at bedtime  insulin lispro (HumaLOG) corrective regimen sliding scale   SubCutaneous three times a day before meals  insulin lispro (HumaLOG) corrective regimen sliding scale   SubCutaneous at bedtime  insulin lispro Injectable (HumaLOG) 27 Unit(s) SubCutaneous three times a day with meals  lisinopril 2.5 milliGRAM(s) Oral daily  melatonin 5 milliGRAM(s) Oral at bedtime  nicotine - Inhaler 1 Each Inhalation daily  pantoprazole    Tablet 40 milliGRAM(s) Oral daily  polyethylene glycol 3350 17 Gram(s) Oral every 12 hours  risperiDONE   Tablet 0.5 milliGRAM(s) Oral three times a day  senna 2 Tablet(s) Oral at bedtime  sertraline 50 milliGRAM(s) Oral at bedtime    MEDICATIONS  (PRN):  acetaminophen   Tablet .. 650 milliGRAM(s) Oral every 6 hours PRN Mild Pain (1 - 3)  aluminum hydroxide/magnesium hydroxide/simethicone Suspension 30 milliLiter(s) Oral every 4 hours PRN Dyspepsia  bisacodyl 5 milliGRAM(s) Oral every 12 hours PRN Constipation  dextrose 40% Gel 15 Gram(s) Oral once PRN Blood Glucose LESS THAN 70 milliGRAM(s)/deciliter  glucagon  Injectable 1 milliGRAM(s) IntraMuscular once PRN Glucose LESS THAN 70 milligrams/deciliter  oxycodone    5 mG/acetaminophen 325 mG 2 Tablet(s) Oral every 6 hours PRN Moderate Pain (4 - 6)      Care Discussed with Consultants/Other Providers [ ] YES  [ ] NO

## 2020-01-25 LAB
GLUCOSE BLDC GLUCOMTR-MCNC: 190 MG/DL — HIGH (ref 70–99)
GLUCOSE BLDC GLUCOMTR-MCNC: 238 MG/DL — HIGH (ref 70–99)
GLUCOSE BLDC GLUCOMTR-MCNC: 268 MG/DL — HIGH (ref 70–99)
GLUCOSE BLDC GLUCOMTR-MCNC: 289 MG/DL — HIGH (ref 70–99)

## 2020-01-25 RX ORDER — INSULIN LISPRO 100/ML
30 VIAL (ML) SUBCUTANEOUS
Refills: 0 | Status: DISCONTINUED | OUTPATIENT
Start: 2020-01-25 | End: 2020-03-13

## 2020-01-25 RX ORDER — INSULIN GLARGINE 100 [IU]/ML
75 INJECTION, SOLUTION SUBCUTANEOUS AT BEDTIME
Refills: 0 | Status: DISCONTINUED | OUTPATIENT
Start: 2020-01-25 | End: 2020-03-13

## 2020-01-25 RX ADMIN — Medication 27 UNIT(S): at 09:16

## 2020-01-25 RX ADMIN — OXYCODONE AND ACETAMINOPHEN 2 TABLET(S): 5; 325 TABLET ORAL at 06:57

## 2020-01-25 RX ADMIN — ENOXAPARIN SODIUM 40 MILLIGRAM(S): 100 INJECTION SUBCUTANEOUS at 12:45

## 2020-01-25 RX ADMIN — Medication 1: at 22:12

## 2020-01-25 RX ADMIN — Medication 5 MILLIGRAM(S): at 21:25

## 2020-01-25 RX ADMIN — OXYCODONE AND ACETAMINOPHEN 2 TABLET(S): 5; 325 TABLET ORAL at 13:13

## 2020-01-25 RX ADMIN — OXYCODONE AND ACETAMINOPHEN 2 TABLET(S): 5; 325 TABLET ORAL at 19:14

## 2020-01-25 RX ADMIN — Medication 3 MILLILITER(S): at 06:26

## 2020-01-25 RX ADMIN — Medication 30 UNIT(S): at 17:48

## 2020-01-25 RX ADMIN — PANTOPRAZOLE SODIUM 40 MILLIGRAM(S): 20 TABLET, DELAYED RELEASE ORAL at 12:44

## 2020-01-25 RX ADMIN — BUDESONIDE AND FORMOTEROL FUMARATE DIHYDRATE 2 PUFF(S): 160; 4.5 AEROSOL RESPIRATORY (INHALATION) at 06:27

## 2020-01-25 RX ADMIN — OXYCODONE AND ACETAMINOPHEN 2 TABLET(S): 5; 325 TABLET ORAL at 00:57

## 2020-01-25 RX ADMIN — Medication 3 MILLILITER(S): at 00:28

## 2020-01-25 RX ADMIN — RISPERIDONE 0.5 MILLIGRAM(S): 4 TABLET ORAL at 21:25

## 2020-01-25 RX ADMIN — RISPERIDONE 0.5 MILLIGRAM(S): 4 TABLET ORAL at 06:27

## 2020-01-25 RX ADMIN — OXYCODONE AND ACETAMINOPHEN 2 TABLET(S): 5; 325 TABLET ORAL at 18:44

## 2020-01-25 RX ADMIN — OXYCODONE AND ACETAMINOPHEN 2 TABLET(S): 5; 325 TABLET ORAL at 06:27

## 2020-01-25 RX ADMIN — Medication 3 MILLILITER(S): at 17:50

## 2020-01-25 RX ADMIN — Medication 27 UNIT(S): at 12:45

## 2020-01-25 RX ADMIN — LISINOPRIL 2.5 MILLIGRAM(S): 2.5 TABLET ORAL at 06:27

## 2020-01-25 RX ADMIN — Medication 3 MILLILITER(S): at 12:42

## 2020-01-25 RX ADMIN — ATORVASTATIN CALCIUM 40 MILLIGRAM(S): 80 TABLET, FILM COATED ORAL at 21:25

## 2020-01-25 RX ADMIN — RISPERIDONE 0.5 MILLIGRAM(S): 4 TABLET ORAL at 14:32

## 2020-01-25 RX ADMIN — OXYCODONE AND ACETAMINOPHEN 2 TABLET(S): 5; 325 TABLET ORAL at 12:43

## 2020-01-25 RX ADMIN — BUDESONIDE AND FORMOTEROL FUMARATE DIHYDRATE 2 PUFF(S): 160; 4.5 AEROSOL RESPIRATORY (INHALATION) at 17:50

## 2020-01-25 RX ADMIN — Medication 20 MILLIGRAM(S): at 06:27

## 2020-01-25 RX ADMIN — Medication 2: at 09:16

## 2020-01-25 RX ADMIN — INSULIN GLARGINE 75 UNIT(S): 100 INJECTION, SOLUTION SUBCUTANEOUS at 22:13

## 2020-01-25 RX ADMIN — Medication 1: at 17:48

## 2020-01-25 RX ADMIN — SERTRALINE 50 MILLIGRAM(S): 25 TABLET, FILM COATED ORAL at 21:25

## 2020-01-25 RX ADMIN — OXYCODONE AND ACETAMINOPHEN 2 TABLET(S): 5; 325 TABLET ORAL at 00:27

## 2020-01-25 RX ADMIN — Medication 3: at 12:46

## 2020-01-25 RX ADMIN — Medication 81 MILLIGRAM(S): at 12:44

## 2020-01-25 NOTE — PROGRESS NOTE ADULT - SUBJECTIVE AND OBJECTIVE BOX
Patient is a 59y old  Male who presents with a chief complaint of 59M w/ n/v/d and hyperglycemia sent in from [facility] (25 Jan 2020 17:00)      INTERVAL HPI/OVERNIGHT EVENTS:  T(C): 36.7 (01-25-20 @ 20:02), Max: 36.9 (01-25-20 @ 12:04)  HR: 90 (01-25-20 @ 20:02) (69 - 90)  BP: 121/78 (01-25-20 @ 20:02) (104/61 - 121/78)  RR: 18 (01-25-20 @ 20:02) (16 - 18)  SpO2: 95% (01-25-20 @ 20:02) (95% - 97%)  Wt(kg): --  I&O's Summary    24 Jan 2020 07:01  -  25 Jan 2020 07:00  --------------------------------------------------------  IN: 1480 mL / OUT: 1450 mL / NET: 30 mL    25 Jan 2020 07:01  -  25 Jan 2020 21:49  --------------------------------------------------------  IN: 900 mL / OUT: 1950 mL / NET: -1050 mL        PAST MEDICAL & SURGICAL HISTORY:  Oxygen dependent  Gastroesophageal reflux disease, esophagitis presence not specified  Smoker  Bipolar 1 disorder  Coronary artery disease involving native coronary artery of native heart without angina pectoris  Type 2 diabetes mellitus without complication, with long-term current use of insulin  Pulmonary HTN  HLD (hyperlipidemia)  Stented coronary artery  COPD (chronic obstructive pulmonary disease)  No significant past surgical history      SOCIAL HISTORY  Alcohol:  Tobacco:  Illicit substance use:    FAMILY HISTORY:    REVIEW OF SYSTEMS:  CONSTITUTIONAL: No fever, weight loss, or fatigue  EYES: No eye pain, visual disturbances, or discharge  ENMT:  No difficulty hearing, tinnitus, vertigo; No sinus or throat pain  NECK: No pain or stiffness  RESPIRATORY: No cough, wheezing, chills or hemoptysis; No shortness of breath  CARDIOVASCULAR: No chest pain, palpitations, dizziness, or leg swelling  GASTROINTESTINAL: No abdominal or epigastric pain. No nausea, vomiting, or hematemesis; No diarrhea or constipation. No melena or hematochezia.  GENITOURINARY: No dysuria, frequency, hematuria, or incontinence  NEUROLOGICAL: No headaches, memory loss, loss of strength, numbness, or tremors  SKIN: No itching, burning, rashes, or lesions   LYMPH NODES: No enlarged glands  ENDOCRINE: No heat or cold intolerance; No hair loss  MUSCULOSKELETAL: No joint pain or swelling; No muscle, back, or extremity pain  PSYCHIATRIC: No depression, anxiety, mood swings, or difficulty sleeping  HEME/LYMPH: No easy bruising, or bleeding gums  ALLERY AND IMMUNOLOGIC: No hives or eczema    RADIOLOGY & ADDITIONAL TESTS:    Imaging Personally Reviewed:  [ ] YES  [ ] NO    Consultant(s) Notes Reviewed:  [ ] YES  [ ] NO    PHYSICAL EXAM:  GENERAL: NAD, well-groomed, well-developed  HEAD:  Atraumatic, Normocephalic  EYES: EOMI, PERRLA, conjunctiva and sclera clear  ENMT: No tonsillar erythema, exudates, or enlargement; Moist mucous membranes, Good dentition, No lesions  NECK: Supple, No JVD, Normal thyroid  NERVOUS SYSTEM:  Alert & Oriented X3, Good concentration; Motor Strength 5/5 B/L upper and lower extremities; DTRs 2+ intact and symmetric  CHEST/LUNG: Clear to percussion bilaterally; No rales, rhonchi, wheezing, or rubs  HEART: Regular rate and rhythm; No murmurs, rubs, or gallops  ABDOMEN: Soft, Nontender, Nondistended; Bowel sounds present  EXTREMITIES:  2+ Peripheral Pulses, No clubbing, cyanosis, or edema  LYMPH: No lymphadenopathy noted  SKIN: No rashes or lesions    LABS:              CAPILLARY BLOOD GLUCOSE      POCT Blood Glucose.: 190 mg/dL (25 Jan 2020 17:21)  POCT Blood Glucose.: 289 mg/dL (25 Jan 2020 12:16)  POCT Blood Glucose.: 238 mg/dL (25 Jan 2020 08:30)  POCT Blood Glucose.: 221 mg/dL (24 Jan 2020 22:33)            MEDICATIONS  (STANDING):  albuterol/ipratropium for Nebulization 3 milliLiter(s) Nebulizer every 6 hours  aspirin enteric coated 81 milliGRAM(s) Oral daily  atorvastatin 40 milliGRAM(s) Oral at bedtime  budesonide 160 MICROgram(s)/formoterol 4.5 MICROgram(s) Inhaler 2 Puff(s) Inhalation two times a day  dextrose 5%. 1000 milliLiter(s) (50 mL/Hr) IV Continuous <Continuous>  dextrose 50% Injectable 12.5 Gram(s) IV Push once  dextrose 50% Injectable 25 Gram(s) IV Push once  dextrose 50% Injectable 25 Gram(s) IV Push once  enoxaparin Injectable 40 milliGRAM(s) SubCutaneous daily  furosemide    Tablet 20 milliGRAM(s) Oral daily  insulin glargine Injectable (LANTUS) 75 Unit(s) SubCutaneous at bedtime  insulin lispro (HumaLOG) corrective regimen sliding scale   SubCutaneous three times a day before meals  insulin lispro (HumaLOG) corrective regimen sliding scale   SubCutaneous at bedtime  insulin lispro Injectable (HumaLOG) 30 Unit(s) SubCutaneous three times a day with meals  lisinopril 2.5 milliGRAM(s) Oral daily  melatonin 5 milliGRAM(s) Oral at bedtime  nicotine - Inhaler 1 Each Inhalation daily  pantoprazole    Tablet 40 milliGRAM(s) Oral daily  polyethylene glycol 3350 17 Gram(s) Oral every 12 hours  risperiDONE   Tablet 0.5 milliGRAM(s) Oral three times a day  senna 2 Tablet(s) Oral at bedtime  sertraline 50 milliGRAM(s) Oral at bedtime    MEDICATIONS  (PRN):  acetaminophen   Tablet .. 650 milliGRAM(s) Oral every 6 hours PRN Mild Pain (1 - 3)  aluminum hydroxide/magnesium hydroxide/simethicone Suspension 30 milliLiter(s) Oral every 4 hours PRN Dyspepsia  bisacodyl 5 milliGRAM(s) Oral every 12 hours PRN Constipation  dextrose 40% Gel 15 Gram(s) Oral once PRN Blood Glucose LESS THAN 70 milliGRAM(s)/deciliter  glucagon  Injectable 1 milliGRAM(s) IntraMuscular once PRN Glucose LESS THAN 70 milligrams/deciliter  oxycodone    5 mG/acetaminophen 325 mG 2 Tablet(s) Oral every 6 hours PRN Moderate Pain (4 - 6)      Care Discussed with Consultants/Other Providers [ ] YES  [ ] NO

## 2020-01-25 NOTE — PROGRESS NOTE ADULT - PROBLEM SELECTOR PLAN 1
Will increase Lantus to 75 units at bed time.  Will increase Humalog to 30 units before each meal in addition to Humalog correction scale coverage.  Patient counseled for compliance with consistent low carb diet, exercise as tolerated as out patient.

## 2020-01-25 NOTE — PROGRESS NOTE ADULT - SUBJECTIVE AND OBJECTIVE BOX
Chief complaint  Patient is a 59y old  Male who presents with a chief complaint of 59M w/ n/v/d and hyperglycemia sent in from [facility] (24 Jan 2020 13:12)   Review of systems  Patient in bed, looks comfortable, no fever,  no hypoglycemia.    Labs and Fingersticks  CAPILLARY BLOOD GLUCOSE      POCT Blood Glucose.: 289 mg/dL (25 Jan 2020 12:16)  POCT Blood Glucose.: 238 mg/dL (25 Jan 2020 08:30)  POCT Blood Glucose.: 221 mg/dL (24 Jan 2020 22:33)  POCT Blood Glucose.: 231 mg/dL (24 Jan 2020 17:03)                        Medications  MEDICATIONS  (STANDING):  albuterol/ipratropium for Nebulization 3 milliLiter(s) Nebulizer every 6 hours  aspirin enteric coated 81 milliGRAM(s) Oral daily  atorvastatin 40 milliGRAM(s) Oral at bedtime  budesonide 160 MICROgram(s)/formoterol 4.5 MICROgram(s) Inhaler 2 Puff(s) Inhalation two times a day  dextrose 5%. 1000 milliLiter(s) (50 mL/Hr) IV Continuous <Continuous>  dextrose 50% Injectable 12.5 Gram(s) IV Push once  dextrose 50% Injectable 25 Gram(s) IV Push once  dextrose 50% Injectable 25 Gram(s) IV Push once  enoxaparin Injectable 40 milliGRAM(s) SubCutaneous daily  furosemide    Tablet 20 milliGRAM(s) Oral daily  insulin glargine Injectable (LANTUS) 75 Unit(s) SubCutaneous at bedtime  insulin lispro (HumaLOG) corrective regimen sliding scale   SubCutaneous three times a day before meals  insulin lispro (HumaLOG) corrective regimen sliding scale   SubCutaneous at bedtime  insulin lispro Injectable (HumaLOG) 30 Unit(s) SubCutaneous three times a day with meals  lisinopril 2.5 milliGRAM(s) Oral daily  melatonin 5 milliGRAM(s) Oral at bedtime  nicotine - Inhaler 1 Each Inhalation daily  pantoprazole    Tablet 40 milliGRAM(s) Oral daily  polyethylene glycol 3350 17 Gram(s) Oral every 12 hours  risperiDONE   Tablet 0.5 milliGRAM(s) Oral three times a day  senna 2 Tablet(s) Oral at bedtime  sertraline 50 milliGRAM(s) Oral at bedtime      Physical Exam  General: Patient comfortable in bed  Vital Signs Last 12 Hrs  T(F): 98.5 (01-25-20 @ 12:04), Max: 98.5 (01-25-20 @ 12:04)  HR: 73 (01-25-20 @ 12:04) (69 - 73)  BP: 104/61 (01-25-20 @ 12:04) (104/61 - 111/72)  BP(mean): --  RR: 18 (01-25-20 @ 12:04) (16 - 18)  SpO2: 97% (01-25-20 @ 12:04) (96% - 97%)  Neck: No palpable thyroid nodules.  CVS: S1S2, No murmurs  Respiratory: No wheezing, no crepitations  GI: Abdomen soft, bowel sounds positive  Musculoskeletal:  edema lower extremities.   Skin: No skin rashes, no ecchymosis    Diagnostics

## 2020-01-25 NOTE — PROGRESS NOTE ADULT - ASSESSMENT
Assessment  DMT2: 59y Male with DM T2 with hyperglycemia, A1C 10.4%, on basal bolus insulin, increased dose,  blood sugars still not at target,  no hypoglycemic episodes. Patient is eating full meals with good appetite, alert, resting comfortably in bed without complaints. Cleared for DC, awaiting rehab placement.  CAD: on medications, no chest pain, stable, monitored.  HTN: Controlled,  on antihypertensive medications.  Overweight/Obesity: No strict exercise routines, not on any weight loss program, neither on low  calorie diet.          Laury Romero MD  Cell: [phone]  Office: [phone]

## 2020-01-26 LAB
GLUCOSE BLDC GLUCOMTR-MCNC: 116 MG/DL — HIGH (ref 70–99)
GLUCOSE BLDC GLUCOMTR-MCNC: 141 MG/DL — HIGH (ref 70–99)
GLUCOSE BLDC GLUCOMTR-MCNC: 158 MG/DL — HIGH (ref 70–99)
GLUCOSE BLDC GLUCOMTR-MCNC: 196 MG/DL — HIGH (ref 70–99)

## 2020-01-26 RX ADMIN — Medication 3 MILLILITER(S): at 12:48

## 2020-01-26 RX ADMIN — OXYCODONE AND ACETAMINOPHEN 2 TABLET(S): 5; 325 TABLET ORAL at 21:08

## 2020-01-26 RX ADMIN — RISPERIDONE 0.5 MILLIGRAM(S): 4 TABLET ORAL at 20:19

## 2020-01-26 RX ADMIN — ATORVASTATIN CALCIUM 40 MILLIGRAM(S): 80 TABLET, FILM COATED ORAL at 20:19

## 2020-01-26 RX ADMIN — BUDESONIDE AND FORMOTEROL FUMARATE DIHYDRATE 2 PUFF(S): 160; 4.5 AEROSOL RESPIRATORY (INHALATION) at 05:48

## 2020-01-26 RX ADMIN — RISPERIDONE 0.5 MILLIGRAM(S): 4 TABLET ORAL at 14:53

## 2020-01-26 RX ADMIN — OXYCODONE AND ACETAMINOPHEN 2 TABLET(S): 5; 325 TABLET ORAL at 10:00

## 2020-01-26 RX ADMIN — ENOXAPARIN SODIUM 40 MILLIGRAM(S): 100 INJECTION SUBCUTANEOUS at 12:48

## 2020-01-26 RX ADMIN — Medication 30 UNIT(S): at 18:00

## 2020-01-26 RX ADMIN — Medication 30 UNIT(S): at 12:49

## 2020-01-26 RX ADMIN — Medication 5 MILLIGRAM(S): at 20:23

## 2020-01-26 RX ADMIN — LISINOPRIL 2.5 MILLIGRAM(S): 2.5 TABLET ORAL at 05:48

## 2020-01-26 RX ADMIN — SENNA PLUS 2 TABLET(S): 8.6 TABLET ORAL at 20:19

## 2020-01-26 RX ADMIN — Medication 20 MILLIGRAM(S): at 05:48

## 2020-01-26 RX ADMIN — OXYCODONE AND ACETAMINOPHEN 2 TABLET(S): 5; 325 TABLET ORAL at 00:45

## 2020-01-26 RX ADMIN — OXYCODONE AND ACETAMINOPHEN 2 TABLET(S): 5; 325 TABLET ORAL at 15:17

## 2020-01-26 RX ADMIN — PANTOPRAZOLE SODIUM 40 MILLIGRAM(S): 20 TABLET, DELAYED RELEASE ORAL at 12:48

## 2020-01-26 RX ADMIN — OXYCODONE AND ACETAMINOPHEN 2 TABLET(S): 5; 325 TABLET ORAL at 01:15

## 2020-01-26 RX ADMIN — INSULIN GLARGINE 75 UNIT(S): 100 INJECTION, SOLUTION SUBCUTANEOUS at 22:41

## 2020-01-26 RX ADMIN — OXYCODONE AND ACETAMINOPHEN 2 TABLET(S): 5; 325 TABLET ORAL at 16:10

## 2020-01-26 RX ADMIN — OXYCODONE AND ACETAMINOPHEN 2 TABLET(S): 5; 325 TABLET ORAL at 22:00

## 2020-01-26 RX ADMIN — RISPERIDONE 0.5 MILLIGRAM(S): 4 TABLET ORAL at 05:48

## 2020-01-26 RX ADMIN — Medication 1: at 09:08

## 2020-01-26 RX ADMIN — OXYCODONE AND ACETAMINOPHEN 2 TABLET(S): 5; 325 TABLET ORAL at 09:09

## 2020-01-26 RX ADMIN — BUDESONIDE AND FORMOTEROL FUMARATE DIHYDRATE 2 PUFF(S): 160; 4.5 AEROSOL RESPIRATORY (INHALATION) at 18:00

## 2020-01-26 RX ADMIN — Medication 81 MILLIGRAM(S): at 12:48

## 2020-01-26 RX ADMIN — Medication 3 MILLILITER(S): at 05:47

## 2020-01-26 RX ADMIN — Medication 3 MILLILITER(S): at 00:44

## 2020-01-26 RX ADMIN — Medication 30 UNIT(S): at 09:09

## 2020-01-26 RX ADMIN — Medication 5 MILLIGRAM(S): at 18:05

## 2020-01-26 RX ADMIN — Medication 3 MILLILITER(S): at 18:01

## 2020-01-26 RX ADMIN — SERTRALINE 50 MILLIGRAM(S): 25 TABLET, FILM COATED ORAL at 20:23

## 2020-01-26 NOTE — PROGRESS NOTE ADULT - SUBJECTIVE AND OBJECTIVE BOX
Chief complaint  Patient is a 59y old  Male who presents with a chief complaint of 59M w/ n/v/d and hyperglycemia sent in from [facility] (26 Jan 2020 18:22)   Review of systems  Patient in bed, looks comfortable, no fever, no hypoglycemia.    Labs and Fingersticks  CAPILLARY BLOOD GLUCOSE      POCT Blood Glucose.: 141 mg/dL (26 Jan 2020 17:17)  POCT Blood Glucose.: 116 mg/dL (26 Jan 2020 12:44)  POCT Blood Glucose.: 196 mg/dL (26 Jan 2020 08:19)  POCT Blood Glucose.: 268 mg/dL (25 Jan 2020 21:56)                        Medications  MEDICATIONS  (STANDING):  albuterol/ipratropium for Nebulization 3 milliLiter(s) Nebulizer every 6 hours  aspirin enteric coated 81 milliGRAM(s) Oral daily  atorvastatin 40 milliGRAM(s) Oral at bedtime  budesonide 160 MICROgram(s)/formoterol 4.5 MICROgram(s) Inhaler 2 Puff(s) Inhalation two times a day  dextrose 5%. 1000 milliLiter(s) (50 mL/Hr) IV Continuous <Continuous>  dextrose 50% Injectable 12.5 Gram(s) IV Push once  dextrose 50% Injectable 25 Gram(s) IV Push once  dextrose 50% Injectable 25 Gram(s) IV Push once  enoxaparin Injectable 40 milliGRAM(s) SubCutaneous daily  furosemide    Tablet 20 milliGRAM(s) Oral daily  insulin glargine Injectable (LANTUS) 75 Unit(s) SubCutaneous at bedtime  insulin lispro (HumaLOG) corrective regimen sliding scale   SubCutaneous three times a day before meals  insulin lispro (HumaLOG) corrective regimen sliding scale   SubCutaneous at bedtime  insulin lispro Injectable (HumaLOG) 30 Unit(s) SubCutaneous three times a day with meals  lisinopril 2.5 milliGRAM(s) Oral daily  melatonin 5 milliGRAM(s) Oral at bedtime  nicotine - Inhaler 1 Each Inhalation daily  pantoprazole    Tablet 40 milliGRAM(s) Oral daily  polyethylene glycol 3350 17 Gram(s) Oral every 12 hours  risperiDONE   Tablet 0.5 milliGRAM(s) Oral three times a day  senna 2 Tablet(s) Oral at bedtime  sertraline 50 milliGRAM(s) Oral at bedtime      Physical Exam  General: Patient comfortable in bed  Vital Signs Last 12 Hrs  T(F): 97.7 (01-26-20 @ 19:45), Max: 97.7 (01-26-20 @ 19:45)  HR: 78 (01-26-20 @ 19:45) (78 - 82)  BP: 119/77 (01-26-20 @ 19:45) (115/73 - 119/77)  BP(mean): --  RR: 18 (01-26-20 @ 19:45) (18 - 18)  SpO2: 96% (01-26-20 @ 19:45) (96% - 96%)  Neck: No palpable thyroid nodules.  CVS: S1S2, No murmurs  Respiratory: No wheezing, no crepitations  GI: Abdomen soft, bowel sounds positive  Musculoskeletal:  edema lower extremities.   Skin: No skin rashes, no ecchymosis    Diagnostics

## 2020-01-26 NOTE — PROGRESS NOTE ADULT - SUBJECTIVE AND OBJECTIVE BOX
Patient is a 59y old  Male who presents with a chief complaint of 59M w/ n/v/d and hyperglycemia sent in from [facility] (25 Jan 2020 21:49)    pt. seen and examined, no c/o , he is still waiting for placement to shelter [facility]     INTERVAL HPI/OVERNIGHT EVENTS:  T(C): 36.3 (01-26-20 @ 12:11), Max: 36.7 (01-25-20 @ 20:02)  HR: 82 (01-26-20 @ 12:11) (72 - 90)  BP: 115/73 (01-26-20 @ 12:11) (106/57 - 121/78)  RR: 18 (01-26-20 @ 12:11) (16 - 18)  SpO2: 96% (01-26-20 @ 12:11) (95% - 96%)  Wt(kg): --  I&O's Summary    25 Jan 2020 07:01  -  26 Jan 2020 07:00  --------------------------------------------------------  IN: 1860 mL / OUT: 2430 mL / NET: -570 mL    26 Jan 2020 07:01  -  26 Jan 2020 18:22  --------------------------------------------------------  IN: 650 mL / OUT: 2300 mL / NET: -1650 mL        PAST MEDICAL & SURGICAL HISTORY:  Oxygen dependent  Gastroesophageal reflux disease, esophagitis presence not specified  Smoker  Bipolar 1 disorder  Coronary artery disease involving native coronary artery of native heart without angina pectoris  Type 2 diabetes mellitus without complication, with long-term current use of insulin  Pulmonary HTN  HLD (hyperlipidemia)  Stented coronary artery  COPD (chronic obstructive pulmonary disease)  No significant past surgical history      SOCIAL HISTORY  Alcohol:  Tobacco:  Illicit substance use:    FAMILY HISTORY:    REVIEW OF SYSTEMS:  CONSTITUTIONAL: No fever, weight loss, or fatigue  EYES: No eye pain, visual disturbances, or discharge  ENMT:  No difficulty hearing, tinnitus, vertigo; No sinus or throat pain  NECK: No pain or stiffness  RESPIRATORY: No cough, wheezing, chills or hemoptysis; No shortness of breath  CARDIOVASCULAR: No chest pain, palpitations, dizziness, or leg swelling  GASTROINTESTINAL: No abdominal or epigastric pain. No nausea, vomiting, or hematemesis; No diarrhea or constipation. No melena or hematochezia.  GENITOURINARY: No dysuria, frequency, hematuria, or incontinence  NEUROLOGICAL: No headaches, memory loss, loss of strength, numbness, or tremors  SKIN: No itching, burning, rashes, or lesions   LYMPH NODES: No enlarged glands  ENDOCRINE: No heat or cold intolerance; No hair loss  MUSCULOSKELETAL: No joint pain or swelling; No muscle, back, or extremity pain  PSYCHIATRIC: No depression, anxiety, mood swings, or difficulty sleeping  HEME/LYMPH: No easy bruising, or bleeding gums  ALLERY AND IMMUNOLOGIC: No hives or eczema    RADIOLOGY & ADDITIONAL TESTS:    Imaging Personally Reviewed:  [ ] YES  [ ] NO    Consultant(s) Notes Reviewed:  [ ] YES  [ ] NO    PHYSICAL EXAM:  GENERAL: NAD, well-groomed, well-developed  HEAD:  Atraumatic, Normocephalic  EYES: EOMI, PERRLA, conjunctiva and sclera clear  ENMT: No tonsillar erythema, exudates, or enlargement; Moist mucous membranes, Good dentition, No lesions  NECK: Supple, No JVD, Normal thyroid  NERVOUS SYSTEM:  Alert & Oriented X3, Good concentration; Motor Strength 5/5 B/L upper and lower extremities; DTRs 2+ intact and symmetric  CHEST/LUNG: Clear to percussion bilaterally; No rales, rhonchi, wheezing, or rubs  HEART: Regular rate and rhythm; No murmurs, rubs, or gallops  ABDOMEN: Soft, Nontender, Nondistended; Bowel sounds present  EXTREMITIES:  2+ Peripheral Pulses, No clubbing, cyanosis, or edema  LYMPH: No lymphadenopathy noted  SKIN: No rashes or lesions    LABS:              CAPILLARY BLOOD GLUCOSE      POCT Blood Glucose.: 141 mg/dL (26 Jan 2020 17:17)  POCT Blood Glucose.: 116 mg/dL (26 Jan 2020 12:44)  POCT Blood Glucose.: 196 mg/dL (26 Jan 2020 08:19)  POCT Blood Glucose.: 268 mg/dL (25 Jan 2020 21:56)            MEDICATIONS  (STANDING):  albuterol/ipratropium for Nebulization 3 milliLiter(s) Nebulizer every 6 hours  aspirin enteric coated 81 milliGRAM(s) Oral daily  atorvastatin 40 milliGRAM(s) Oral at bedtime  budesonide 160 MICROgram(s)/formoterol 4.5 MICROgram(s) Inhaler 2 Puff(s) Inhalation two times a day  dextrose 5%. 1000 milliLiter(s) (50 mL/Hr) IV Continuous <Continuous>  dextrose 50% Injectable 12.5 Gram(s) IV Push once  dextrose 50% Injectable 25 Gram(s) IV Push once  dextrose 50% Injectable 25 Gram(s) IV Push once  enoxaparin Injectable 40 milliGRAM(s) SubCutaneous daily  furosemide    Tablet 20 milliGRAM(s) Oral daily  insulin glargine Injectable (LANTUS) 75 Unit(s) SubCutaneous at bedtime  insulin lispro (HumaLOG) corrective regimen sliding scale   SubCutaneous three times a day before meals  insulin lispro (HumaLOG) corrective regimen sliding scale   SubCutaneous at bedtime  insulin lispro Injectable (HumaLOG) 30 Unit(s) SubCutaneous three times a day with meals  lisinopril 2.5 milliGRAM(s) Oral daily  melatonin 5 milliGRAM(s) Oral at bedtime  nicotine - Inhaler 1 Each Inhalation daily  pantoprazole    Tablet 40 milliGRAM(s) Oral daily  polyethylene glycol 3350 17 Gram(s) Oral every 12 hours  risperiDONE   Tablet 0.5 milliGRAM(s) Oral three times a day  senna 2 Tablet(s) Oral at bedtime  sertraline 50 milliGRAM(s) Oral at bedtime    MEDICATIONS  (PRN):  acetaminophen   Tablet .. 650 milliGRAM(s) Oral every 6 hours PRN Mild Pain (1 - 3)  aluminum hydroxide/magnesium hydroxide/simethicone Suspension 30 milliLiter(s) Oral every 4 hours PRN Dyspepsia  bisacodyl 5 milliGRAM(s) Oral every 12 hours PRN Constipation  dextrose 40% Gel 15 Gram(s) Oral once PRN Blood Glucose LESS THAN 70 milliGRAM(s)/deciliter  glucagon  Injectable 1 milliGRAM(s) IntraMuscular once PRN Glucose LESS THAN 70 milligrams/deciliter  oxycodone    5 mG/acetaminophen 325 mG 2 Tablet(s) Oral every 6 hours PRN Moderate Pain (4 - 6)      Care Discussed with Consultants/Other Providers [ ] YES  [ ] NO

## 2020-01-26 NOTE — PROGRESS NOTE ADULT - ASSESSMENT
Assessment  DMT2: 59y Male with DM T2 with hyperglycemia, A1C 10.4%, on basal bolus insulin, increased dose,  blood sugars still improving no hypoglycemic episodes. Patient is eating full meals with good appetite, alert, resting comfortably in bed without complaints. Cleared for DC, awaiting rehab placement.  CAD: on medications, no chest pain, stable, monitored.  HTN: Controlled,  on antihypertensive medications.  Overweight/Obesity: No strict exercise routines, not on any weight loss program, neither on low  calorie diet.          Laury Romero MD  Cell: [phone]  Office: [phone]

## 2020-01-27 ENCOUNTER — TRANSCRIPTION ENCOUNTER (OUTPATIENT)
Age: 60
End: 2020-01-27

## 2020-01-27 LAB
GLUCOSE BLDC GLUCOMTR-MCNC: 167 MG/DL — HIGH (ref 70–99)
GLUCOSE BLDC GLUCOMTR-MCNC: 180 MG/DL — HIGH (ref 70–99)
GLUCOSE BLDC GLUCOMTR-MCNC: 190 MG/DL — HIGH (ref 70–99)
GLUCOSE BLDC GLUCOMTR-MCNC: 292 MG/DL — HIGH (ref 70–99)

## 2020-01-27 RX ORDER — OXYCODONE AND ACETAMINOPHEN 5; 325 MG/1; MG/1
2 TABLET ORAL EVERY 6 HOURS
Refills: 0 | Status: DISCONTINUED | OUTPATIENT
Start: 2020-01-27 | End: 2020-02-03

## 2020-01-27 RX ADMIN — LISINOPRIL 2.5 MILLIGRAM(S): 2.5 TABLET ORAL at 05:13

## 2020-01-27 RX ADMIN — Medication 81 MILLIGRAM(S): at 12:46

## 2020-01-27 RX ADMIN — Medication 30 UNIT(S): at 08:33

## 2020-01-27 RX ADMIN — Medication 1: at 08:34

## 2020-01-27 RX ADMIN — Medication 3 MILLILITER(S): at 11:30

## 2020-01-27 RX ADMIN — BUDESONIDE AND FORMOTEROL FUMARATE DIHYDRATE 2 PUFF(S): 160; 4.5 AEROSOL RESPIRATORY (INHALATION) at 05:09

## 2020-01-27 RX ADMIN — RISPERIDONE 0.5 MILLIGRAM(S): 4 TABLET ORAL at 13:45

## 2020-01-27 RX ADMIN — INSULIN GLARGINE 75 UNIT(S): 100 INJECTION, SOLUTION SUBCUTANEOUS at 22:19

## 2020-01-27 RX ADMIN — Medication 5 MILLIGRAM(S): at 20:17

## 2020-01-27 RX ADMIN — SERTRALINE 50 MILLIGRAM(S): 25 TABLET, FILM COATED ORAL at 20:17

## 2020-01-27 RX ADMIN — PANTOPRAZOLE SODIUM 40 MILLIGRAM(S): 20 TABLET, DELAYED RELEASE ORAL at 11:22

## 2020-01-27 RX ADMIN — BUDESONIDE AND FORMOTEROL FUMARATE DIHYDRATE 2 PUFF(S): 160; 4.5 AEROSOL RESPIRATORY (INHALATION) at 18:26

## 2020-01-27 RX ADMIN — Medication 30 UNIT(S): at 12:45

## 2020-01-27 RX ADMIN — OXYCODONE AND ACETAMINOPHEN 2 TABLET(S): 5; 325 TABLET ORAL at 18:25

## 2020-01-27 RX ADMIN — ATORVASTATIN CALCIUM 40 MILLIGRAM(S): 80 TABLET, FILM COATED ORAL at 20:17

## 2020-01-27 RX ADMIN — RISPERIDONE 0.5 MILLIGRAM(S): 4 TABLET ORAL at 20:17

## 2020-01-27 RX ADMIN — RISPERIDONE 0.5 MILLIGRAM(S): 4 TABLET ORAL at 05:09

## 2020-01-27 RX ADMIN — OXYCODONE AND ACETAMINOPHEN 2 TABLET(S): 5; 325 TABLET ORAL at 18:55

## 2020-01-27 RX ADMIN — OXYCODONE AND ACETAMINOPHEN 2 TABLET(S): 5; 325 TABLET ORAL at 11:52

## 2020-01-27 RX ADMIN — OXYCODONE AND ACETAMINOPHEN 2 TABLET(S): 5; 325 TABLET ORAL at 11:22

## 2020-01-27 RX ADMIN — Medication 3 MILLILITER(S): at 00:02

## 2020-01-27 RX ADMIN — Medication 3: at 18:09

## 2020-01-27 RX ADMIN — Medication 3 MILLILITER(S): at 18:26

## 2020-01-27 RX ADMIN — Medication 30 UNIT(S): at 18:09

## 2020-01-27 RX ADMIN — Medication 20 MILLIGRAM(S): at 05:09

## 2020-01-27 RX ADMIN — Medication 1: at 12:45

## 2020-01-27 RX ADMIN — OXYCODONE AND ACETAMINOPHEN 2 TABLET(S): 5; 325 TABLET ORAL at 05:09

## 2020-01-27 RX ADMIN — OXYCODONE AND ACETAMINOPHEN 2 TABLET(S): 5; 325 TABLET ORAL at 05:34

## 2020-01-27 RX ADMIN — Medication 3 MILLILITER(S): at 05:09

## 2020-01-27 RX ADMIN — ENOXAPARIN SODIUM 40 MILLIGRAM(S): 100 INJECTION SUBCUTANEOUS at 12:46

## 2020-01-27 NOTE — PROGRESS NOTE ADULT - PROBLEM SELECTOR PLAN 1
Will continue current insulin regimen for now. Will continue monitoring FS, log, will Follow up.  Suggest to DC to rehab on current insulin regimen and FU endo 4 weeks.  Patient counseled for compliance with consistent low carb diet, exercise as tolerated as out patient. Will continue current insulin regimen for now. Will continue monitoring FS, log, will Follow up.

## 2020-01-27 NOTE — PROGRESS NOTE ADULT - ASSESSMENT
Assessment  DMT2: 59y Male with DM T2 with hyperglycemia, A1C 10.4%, on basal bolus insulin, increased dose, blood sugars improving, FS within overall acceptable range, no hypoglycemic episodes. Patient is eating full meals with good appetite, alert, resting comfortably in bed without complaints. Cleared for DC, awaiting rehab placement for over a week.  CAD: on medications, no chest pain, stable, monitored.  HTN: Controlled,  on antihypertensive medications.  Overweight/Obesity: No strict exercise routines, not on any weight loss program, neither on low  calorie diet.          Laury Romero MD  Cell: [phone]  Office: [phone] Assessment  DMT2: 59y Male with DM T2 with hyperglycemia, A1C 10.4%, on basal bolus insulin, increased dose, blood sugars improving, FS within overall acceptable range, no hypoglycemic episodes. Patient is eating full meals with good appetite, alert,  resting comfortably in bed without complaints. Cleared for DC, awaiting rehab placement for over a week.  CAD: on medications, no chest pain, stable, monitored.  HTN: Controlled,  on antihypertensive medications.  Overweight/Obesity: No strict exercise routines, not on any weight loss program, neither on low  calorie diet.          Laury Romero MD  Cell: [phone]  Office: [phone]

## 2020-01-27 NOTE — PROGRESS NOTE ADULT - SUBJECTIVE AND OBJECTIVE BOX
Chief complaint  Patient is a 59y old  Male who presents with a chief complaint of 59M w/ n/v/d and hyperglycemia sent in from [facility] (26 Jan 2020 20:34)   Review of systems  Patient in bed, looks comfortable, no hypoglycemia.    Labs and Fingersticks  CAPILLARY BLOOD GLUCOSE      POCT Blood Glucose.: 180 mg/dL (27 Jan 2020 12:18)  POCT Blood Glucose.: 167 mg/dL (27 Jan 2020 08:14)  POCT Blood Glucose.: 158 mg/dL (26 Jan 2020 22:16)  POCT Blood Glucose.: 141 mg/dL (26 Jan 2020 17:17)                        Medications  MEDICATIONS  (STANDING):  albuterol/ipratropium for Nebulization 3 milliLiter(s) Nebulizer every 6 hours  aspirin enteric coated 81 milliGRAM(s) Oral daily  atorvastatin 40 milliGRAM(s) Oral at bedtime  budesonide 160 MICROgram(s)/formoterol 4.5 MICROgram(s) Inhaler 2 Puff(s) Inhalation two times a day  dextrose 5%. 1000 milliLiter(s) (50 mL/Hr) IV Continuous <Continuous>  dextrose 50% Injectable 12.5 Gram(s) IV Push once  dextrose 50% Injectable 25 Gram(s) IV Push once  dextrose 50% Injectable 25 Gram(s) IV Push once  enoxaparin Injectable 40 milliGRAM(s) SubCutaneous daily  furosemide    Tablet 20 milliGRAM(s) Oral daily  insulin glargine Injectable (LANTUS) 75 Unit(s) SubCutaneous at bedtime  insulin lispro (HumaLOG) corrective regimen sliding scale   SubCutaneous three times a day before meals  insulin lispro (HumaLOG) corrective regimen sliding scale   SubCutaneous at bedtime  insulin lispro Injectable (HumaLOG) 30 Unit(s) SubCutaneous three times a day with meals  lisinopril 2.5 milliGRAM(s) Oral daily  melatonin 5 milliGRAM(s) Oral at bedtime  nicotine - Inhaler 1 Each Inhalation daily  pantoprazole    Tablet 40 milliGRAM(s) Oral daily  polyethylene glycol 3350 17 Gram(s) Oral every 12 hours  risperiDONE   Tablet 0.5 milliGRAM(s) Oral three times a day  senna 2 Tablet(s) Oral at bedtime  sertraline 50 milliGRAM(s) Oral at bedtime      Physical Exam  General: Patient comfortable in bed  Vital Signs Last 12 Hrs  T(F): 97.6 (01-27-20 @ 05:07), Max: 97.6 (01-27-20 @ 05:07)  HR: 79 (01-27-20 @ 05:07) (79 - 79)  BP: 125/70 (01-27-20 @ 05:07) (125/70 - 125/70)  BP(mean): --  RR: 18 (01-27-20 @ 05:07) (18 - 18)  SpO2: 95% (01-27-20 @ 05:07) (95% - 95%)  Neck: No palpable thyroid nodules.  CVS: S1S2, No murmurs  Respiratory: No wheezing, no crepitations  GI: Abdomen soft, bowel sounds positive  Musculoskeletal:  edema lower extremities.   Skin: No skin rashes, no ecchymosis    Diagnostics Chief complaint  Patient is a 59y old  Male who presents with a chief  complaint of 59M w/ n/v/d and hyperglycemia sent in from [facility] (26 Jan 2020 20:34)   Review of systems  Patient in bed, looks comfortable, no hypoglycemia.    Labs and Fingersticks  CAPILLARY BLOOD GLUCOSE      POCT Blood Glucose.: 180 mg/dL (27 Jan 2020 12:18)  POCT Blood Glucose.: 167 mg/dL (27 Jan 2020 08:14)  POCT Blood Glucose.: 158 mg/dL (26 Jan 2020 22:16)  POCT Blood Glucose.: 141 mg/dL (26 Jan 2020 17:17)                        Medications  MEDICATIONS  (STANDING):  albuterol/ipratropium for Nebulization 3 milliLiter(s) Nebulizer every 6 hours  aspirin enteric coated 81 milliGRAM(s) Oral daily  atorvastatin 40 milliGRAM(s) Oral at bedtime  budesonide 160 MICROgram(s)/formoterol 4.5 MICROgram(s) Inhaler 2 Puff(s) Inhalation two times a day  dextrose 5%. 1000 milliLiter(s) (50 mL/Hr) IV Continuous <Continuous>  dextrose 50% Injectable 12.5 Gram(s) IV Push once  dextrose 50% Injectable 25 Gram(s) IV Push once  dextrose 50% Injectable 25 Gram(s) IV Push once  enoxaparin Injectable 40 milliGRAM(s) SubCutaneous daily  furosemide    Tablet 20 milliGRAM(s) Oral daily  insulin glargine Injectable (LANTUS) 75 Unit(s) SubCutaneous at bedtime  insulin lispro (HumaLOG) corrective regimen sliding scale   SubCutaneous three times a day before meals  insulin lispro (HumaLOG) corrective regimen sliding scale   SubCutaneous at bedtime  insulin lispro Injectable (HumaLOG) 30 Unit(s) SubCutaneous three times a day with meals  lisinopril 2.5 milliGRAM(s) Oral daily  melatonin 5 milliGRAM(s) Oral at bedtime  nicotine - Inhaler 1 Each Inhalation daily  pantoprazole    Tablet 40 milliGRAM(s) Oral daily  polyethylene glycol 3350 17 Gram(s) Oral every 12 hours  risperiDONE   Tablet 0.5 milliGRAM(s) Oral three times a day  senna 2 Tablet(s) Oral at bedtime  sertraline 50 milliGRAM(s) Oral at bedtime      Physical Exam  General: Patient comfortable in bed  Vital Signs Last 12 Hrs  T(F): 97.6 (01-27-20 @ 05:07), Max: 97.6 (01-27-20 @ 05:07)  HR: 79 (01-27-20 @ 05:07) (79 - 79)  BP: 125/70 (01-27-20 @ 05:07) (125/70 - 125/70)  BP(mean): --  RR: 18 (01-27-20 @ 05:07) (18 - 18)  SpO2: 95% (01-27-20 @ 05:07) (95% - 95%)  Neck: No palpable thyroid nodules.  CVS: S1S2, No murmurs  Respiratory: No wheezing, no crepitations  GI: Abdomen soft, bowel sounds positive  Musculoskeletal:  edema lower extremities.   Skin: No skin rashes, no ecchymosis    Diagnostics

## 2020-01-27 NOTE — PROGRESS NOTE ADULT - SUBJECTIVE AND OBJECTIVE BOX
Patient is a 59y old  Male who presents with a chief complaint of 59M w/ n/v/d and hyperglycemia sent in from [facility] (27 Jan 2020 13:53)      INTERVAL HPI/OVERNIGHT EVENTS:  T(C): 36.4 (01-27-20 @ 19:53), Max: 36.6 (01-27-20 @ 14:14)  HR: 79 (01-27-20 @ 19:53) (79 - 89)  BP: 110/75 (01-27-20 @ 19:53) (100/66 - 125/70)  RR: 18 (01-27-20 @ 19:53) (18 - 18)  SpO2: 96% (01-27-20 @ 19:53) (95% - 96%)  Wt(kg): --  I&O's Summary    26 Jan 2020 07:01  -  27 Jan 2020 07:00  --------------------------------------------------------  IN: 1410 mL / OUT: 2900 mL / NET: -1490 mL    27 Jan 2020 07:01  -  28 Jan 2020 01:27  --------------------------------------------------------  IN: 820 mL / OUT: 2100 mL / NET: -1280 mL        PAST MEDICAL & SURGICAL HISTORY:  Oxygen dependent  Gastroesophageal reflux disease, esophagitis presence not specified  Smoker  Bipolar 1 disorder  Coronary artery disease involving native coronary artery of native heart without angina pectoris  Type 2 diabetes mellitus without complication, with long-term current use of insulin  Pulmonary HTN  HLD (hyperlipidemia)  Stented coronary artery  COPD (chronic obstructive pulmonary disease)  No significant past surgical history      SOCIAL HISTORY  Alcohol:  Tobacco:  Illicit substance use:    FAMILY HISTORY:    REVIEW OF SYSTEMS:  CONSTITUTIONAL: No fever, weight loss, or fatigue  EYES: No eye pain, visual disturbances, or discharge  ENMT:  No difficulty hearing, tinnitus, vertigo; No sinus or throat pain  NECK: No pain or stiffness  RESPIRATORY: No cough, wheezing, chills or hemoptysis; No shortness of breath  CARDIOVASCULAR: No chest pain, palpitations, dizziness, or leg swelling  GASTROINTESTINAL: No abdominal or epigastric pain. No nausea, vomiting, or hematemesis; No diarrhea or constipation. No melena or hematochezia.  GENITOURINARY: No dysuria, frequency, hematuria, or incontinence  NEUROLOGICAL: No headaches, memory loss, loss of strength, numbness, or tremors  SKIN: No itching, burning, rashes, or lesions   LYMPH NODES: No enlarged glands  ENDOCRINE: No heat or cold intolerance; No hair loss  MUSCULOSKELETAL: No joint pain or swelling; No muscle, back, or extremity pain  PSYCHIATRIC: No depression, anxiety, mood swings, or difficulty sleeping  HEME/LYMPH: No easy bruising, or bleeding gums  ALLERY AND IMMUNOLOGIC: No hives or eczema    RADIOLOGY & ADDITIONAL TESTS:    Imaging Personally Reviewed:  [ ] YES  [ ] NO    Consultant(s) Notes Reviewed:  [ ] YES  [ ] NO    PHYSICAL EXAM:  GENERAL: NAD, well-groomed, well-developed  HEAD:  Atraumatic, Normocephalic  EYES: EOMI, PERRLA, conjunctiva and sclera clear  ENMT: No tonsillar erythema, exudates, or enlargement; Moist mucous membranes, Good dentition, No lesions  NECK: Supple, No JVD, Normal thyroid  NERVOUS SYSTEM:  Alert & Oriented X3, Good concentration; Motor Strength 5/5 B/L upper and lower extremities; DTRs 2+ intact and symmetric  CHEST/LUNG: Clear to percussion bilaterally; No rales, rhonchi, wheezing, or rubs  HEART: Regular rate and rhythm; No murmurs, rubs, or gallops  ABDOMEN: Soft, Nontender, Nondistended; Bowel sounds present  EXTREMITIES:  2+ Peripheral Pulses, No clubbing, cyanosis, or edema  LYMPH: No lymphadenopathy noted  SKIN: No rashes or lesions    LABS:              CAPILLARY BLOOD GLUCOSE      POCT Blood Glucose.: 190 mg/dL (27 Jan 2020 21:58)  POCT Blood Glucose.: 292 mg/dL (27 Jan 2020 17:26)  POCT Blood Glucose.: 180 mg/dL (27 Jan 2020 12:18)  POCT Blood Glucose.: 167 mg/dL (27 Jan 2020 08:14)            MEDICATIONS  (STANDING):  albuterol/ipratropium for Nebulization 3 milliLiter(s) Nebulizer every 6 hours  aspirin enteric coated 81 milliGRAM(s) Oral daily  atorvastatin 40 milliGRAM(s) Oral at bedtime  budesonide 160 MICROgram(s)/formoterol 4.5 MICROgram(s) Inhaler 2 Puff(s) Inhalation two times a day  dextrose 5%. 1000 milliLiter(s) (50 mL/Hr) IV Continuous <Continuous>  dextrose 50% Injectable 12.5 Gram(s) IV Push once  dextrose 50% Injectable 25 Gram(s) IV Push once  dextrose 50% Injectable 25 Gram(s) IV Push once  enoxaparin Injectable 40 milliGRAM(s) SubCutaneous daily  furosemide    Tablet 20 milliGRAM(s) Oral daily  insulin glargine Injectable (LANTUS) 75 Unit(s) SubCutaneous at bedtime  insulin lispro (HumaLOG) corrective regimen sliding scale   SubCutaneous three times a day before meals  insulin lispro (HumaLOG) corrective regimen sliding scale   SubCutaneous at bedtime  insulin lispro Injectable (HumaLOG) 30 Unit(s) SubCutaneous three times a day with meals  lisinopril 2.5 milliGRAM(s) Oral daily  melatonin 5 milliGRAM(s) Oral at bedtime  nicotine - Inhaler 1 Each Inhalation daily  pantoprazole    Tablet 40 milliGRAM(s) Oral daily  polyethylene glycol 3350 17 Gram(s) Oral every 12 hours  risperiDONE   Tablet 0.5 milliGRAM(s) Oral three times a day  senna 2 Tablet(s) Oral at bedtime  sertraline 50 milliGRAM(s) Oral at bedtime    MEDICATIONS  (PRN):  acetaminophen   Tablet .. 650 milliGRAM(s) Oral every 6 hours PRN Mild Pain (1 - 3)  aluminum hydroxide/magnesium hydroxide/simethicone Suspension 30 milliLiter(s) Oral every 4 hours PRN Dyspepsia  bisacodyl 5 milliGRAM(s) Oral every 12 hours PRN Constipation  dextrose 40% Gel 15 Gram(s) Oral once PRN Blood Glucose LESS THAN 70 milliGRAM(s)/deciliter  glucagon  Injectable 1 milliGRAM(s) IntraMuscular once PRN Glucose LESS THAN 70 milligrams/deciliter  oxycodone    5 mG/acetaminophen 325 mG 2 Tablet(s) Oral every 6 hours PRN Moderate Pain (4 - 6)      Care Discussed with Consultants/Other Providers [ ] YES  [ ] NO

## 2020-01-27 NOTE — CHART NOTE - NSCHARTNOTEFT_GEN_A_CORE
Nutrition Follow Up Note   Patient visited at bedside, BiPap mask on. Patient noted with abdominal distention. States he eats 100% of meals, had regular BM's ( receiving bowel regimen). denies nausea or vomting, states his appetite is good. patient denies eating food from outside of hospital. Per chart patient is awaiting placement  for transfer. patient followed by endocrinolgy for insulin adjustment      Chart reviewed, events noted.  admission history:59M w/ COPD on 2L NC, DM2 on insulin, CAD s/p stent, HFpEF, pulm HTN, HTN, charted hx of schizophrenia/bipolar disorder, panic disorder, and recent admit for COPD exacerbation , readmitted from (SNF) for n/v/d and hyperglycemia w/ mild DKA (now  resolved) w/ severe sepsis with bandemia.       Consistent carbohydrate with night snack;     Patient requests "more food" but denied on basis of obesity, diet education reinforced  :       Daily Weight in k.6 (-25), Weight in k.2 (-22)  % Weight Change    Pertinent Medications: MEDICATIONS  (STANDING):  albuterol/ipratropium for Nebulization 3 milliLiter(s) Nebulizer every 6 hours  aspirin enteric coated 81 milliGRAM(s) Oral daily  atorvastatin 40 milliGRAM(s) Oral at bedtime  budesonide 160 MICROgram(s)/formoterol 4.5 MICROgram(s) Inhaler 2 Puff(s) Inhalation two times a day  dextrose 5%. 1000 milliLiter(s) (50 mL/Hr) IV Continuous <Continuous>  dextrose 50% Injectable 12.5 Gram(s) IV Push once  dextrose 50% Injectable 25 Gram(s) IV Push once  dextrose 50% Injectable 25 Gram(s) IV Push once  enoxaparin Injectable 40 milliGRAM(s) SubCutaneous daily  furosemide    Tablet 20 milliGRAM(s) Oral daily  insulin glargine Injectable (LANTUS) 75 Unit(s) SubCutaneous at bedtime  insulin lispro (HumaLOG) corrective regimen sliding scale   SubCutaneous three times a day before meals  insulin lispro (HumaLOG) corrective regimen sliding scale   SubCutaneous at bedtime  insulin lispro Injectable (HumaLOG) 30 Unit(s) SubCutaneous three times a day with meals  lisinopril 2.5 milliGRAM(s) Oral daily  melatonin 5 milliGRAM(s) Oral at bedtime  nicotine - Inhaler 1 Each Inhalation daily  pantoprazole    Tablet 40 milliGRAM(s) Oral daily  polyethylene glycol 3350 17 Gram(s) Oral every 12 hours  risperiDONE   Tablet 0.5 milliGRAM(s) Oral three times a day  senna 2 Tablet(s) Oral at bedtime  sertraline 50 milliGRAM(s) Oral at bedtime    MEDICATIONS  (PRN):  acetaminophen   Tablet .. 650 milliGRAM(s) Oral every 6 hours PRN Mild Pain (1 - 3)  aluminum hydroxide/magnesium hydroxide/simethicone Suspension 30 milliLiter(s) Oral every 4 hours PRN Dyspepsia  bisacodyl 5 milliGRAM(s) Oral every 12 hours PRN Constipation  dextrose 40% Gel 15 Gram(s) Oral once PRN Blood Glucose LESS THAN 70 milliGRAM(s)/deciliter  glucagon  Injectable 1 milliGRAM(s) IntraMuscular once PRN Glucose LESS THAN 70 milligrams/deciliter  oxycodone    5 mG/acetaminophen 325 mG 2 Tablet(s) Oral every 6 hours PRN Moderate Pain (4 - 6)    Pertinent Labs:   Finger Sticks:  POCT Blood Glucose.: 167 mg/dL ( @ 08:14)  POCT Blood Glucose.: 158 mg/dL ( @ 22:16)  POCT Blood Glucose.: 141 mg/dL ( @ 17:17)  POCT Blood Glucose.: 116 mg/dL ( @ 12:44)      Skin per nursing documentation: intact  Edema: none    Estimated Needs:   [ X] no change since previous assessment  [ ] recalculated:     Previous Nutrition Diagnosis: Knowledge deficit related to consistent carbohydrate diet  Nutrition Diagnosis is: addressed with education         Recommend  1) Reinforce diet adherence/diet education.     Monitoring and Evaluation:     Continue to monitor Nutritional intake, Tolerance to diet prescription, weights, labs, skin integrity    RD remains available upon request and will follow up per protocol

## 2020-01-27 NOTE — DISCHARGE NOTE NURSING/CASE MANAGEMENT/SOCIAL WORK - PATIENT PORTAL LINK FT
You can access the FollowMyHealth Patient Portal offered by [facility] by registering at the following website: [email or web address]. By joining [facility]’s FollowMyHealth portal, you will also be able to view your health information using other applications (apps) compatible with our system.

## 2020-01-28 LAB
GLUCOSE BLDC GLUCOMTR-MCNC: 120 MG/DL — HIGH (ref 70–99)
GLUCOSE BLDC GLUCOMTR-MCNC: 158 MG/DL — HIGH (ref 70–99)
GLUCOSE BLDC GLUCOMTR-MCNC: 180 MG/DL — HIGH (ref 70–99)
GLUCOSE BLDC GLUCOMTR-MCNC: 191 MG/DL — HIGH (ref 70–99)

## 2020-01-28 RX ADMIN — SERTRALINE 50 MILLIGRAM(S): 25 TABLET, FILM COATED ORAL at 21:45

## 2020-01-28 RX ADMIN — BUDESONIDE AND FORMOTEROL FUMARATE DIHYDRATE 2 PUFF(S): 160; 4.5 AEROSOL RESPIRATORY (INHALATION) at 05:56

## 2020-01-28 RX ADMIN — ENOXAPARIN SODIUM 40 MILLIGRAM(S): 100 INJECTION SUBCUTANEOUS at 11:06

## 2020-01-28 RX ADMIN — SENNA PLUS 2 TABLET(S): 8.6 TABLET ORAL at 21:45

## 2020-01-28 RX ADMIN — OXYCODONE AND ACETAMINOPHEN 2 TABLET(S): 5; 325 TABLET ORAL at 17:50

## 2020-01-28 RX ADMIN — ATORVASTATIN CALCIUM 40 MILLIGRAM(S): 80 TABLET, FILM COATED ORAL at 21:45

## 2020-01-28 RX ADMIN — Medication 650 MILLIGRAM(S): at 09:56

## 2020-01-28 RX ADMIN — Medication 3 MILLILITER(S): at 23:24

## 2020-01-28 RX ADMIN — Medication 3 MILLILITER(S): at 05:58

## 2020-01-28 RX ADMIN — BUDESONIDE AND FORMOTEROL FUMARATE DIHYDRATE 2 PUFF(S): 160; 4.5 AEROSOL RESPIRATORY (INHALATION) at 17:14

## 2020-01-28 RX ADMIN — Medication 30 UNIT(S): at 08:51

## 2020-01-28 RX ADMIN — OXYCODONE AND ACETAMINOPHEN 2 TABLET(S): 5; 325 TABLET ORAL at 13:00

## 2020-01-28 RX ADMIN — Medication 5 MILLIGRAM(S): at 21:45

## 2020-01-28 RX ADMIN — Medication 81 MILLIGRAM(S): at 11:06

## 2020-01-28 RX ADMIN — OXYCODONE AND ACETAMINOPHEN 2 TABLET(S): 5; 325 TABLET ORAL at 12:10

## 2020-01-28 RX ADMIN — OXYCODONE AND ACETAMINOPHEN 2 TABLET(S): 5; 325 TABLET ORAL at 06:22

## 2020-01-28 RX ADMIN — Medication 3 MILLILITER(S): at 00:02

## 2020-01-28 RX ADMIN — Medication 1: at 12:40

## 2020-01-28 RX ADMIN — PANTOPRAZOLE SODIUM 40 MILLIGRAM(S): 20 TABLET, DELAYED RELEASE ORAL at 11:06

## 2020-01-28 RX ADMIN — Medication 1: at 08:51

## 2020-01-28 RX ADMIN — OXYCODONE AND ACETAMINOPHEN 2 TABLET(S): 5; 325 TABLET ORAL at 00:40

## 2020-01-28 RX ADMIN — Medication 650 MILLIGRAM(S): at 08:54

## 2020-01-28 RX ADMIN — OXYCODONE AND ACETAMINOPHEN 2 TABLET(S): 5; 325 TABLET ORAL at 05:57

## 2020-01-28 RX ADMIN — OXYCODONE AND ACETAMINOPHEN 2 TABLET(S): 5; 325 TABLET ORAL at 17:20

## 2020-01-28 RX ADMIN — Medication 30 UNIT(S): at 17:22

## 2020-01-28 RX ADMIN — OXYCODONE AND ACETAMINOPHEN 2 TABLET(S): 5; 325 TABLET ORAL at 23:24

## 2020-01-28 RX ADMIN — OXYCODONE AND ACETAMINOPHEN 2 TABLET(S): 5; 325 TABLET ORAL at 00:02

## 2020-01-28 RX ADMIN — RISPERIDONE 0.5 MILLIGRAM(S): 4 TABLET ORAL at 13:43

## 2020-01-28 RX ADMIN — Medication 3 MILLILITER(S): at 17:14

## 2020-01-28 RX ADMIN — Medication 20 MILLIGRAM(S): at 05:56

## 2020-01-28 RX ADMIN — Medication 30 UNIT(S): at 12:39

## 2020-01-28 RX ADMIN — LISINOPRIL 2.5 MILLIGRAM(S): 2.5 TABLET ORAL at 05:56

## 2020-01-28 RX ADMIN — Medication 3 MILLILITER(S): at 11:07

## 2020-01-28 RX ADMIN — INSULIN GLARGINE 75 UNIT(S): 100 INJECTION, SOLUTION SUBCUTANEOUS at 22:05

## 2020-01-28 RX ADMIN — RISPERIDONE 0.5 MILLIGRAM(S): 4 TABLET ORAL at 22:05

## 2020-01-28 RX ADMIN — RISPERIDONE 0.5 MILLIGRAM(S): 4 TABLET ORAL at 05:57

## 2020-01-28 NOTE — PROGRESS NOTE ADULT - ASSESSMENT
Assessment  DMT2: 59y Male with DM T2 with hyperglycemia, A1C 10.4%, on basal bolus insulin, FS within overall acceptable range, no hypoglycemic episodes. Patient is eating full meals with good appetite, alert, medically cleared for DC, awaiting rehab placement for over a week. No acute events, patient appears comfortable.  CAD: on medications, no chest pain, stable, monitored.  HTN: Controlled,  on antihypertensive medications.  Overweight/Obesity: No strict exercise routines, not on any weight loss program, neither on low  calorie diet.          Laury Romero MD  Cell: [phone]  Office: [phone] Assessment  DMT2: 59y Male with DM T2 with hyperglycemia, A1C 10.4%, on basal bolus insulin, FS within overall acceptable range, no hypoglycemic episodes. Patient is eating full meals with good appetite, alert, medically cleared for DC, awaiting rehab placement for over a week.  No acute events, patient appears comfortable.  CAD: on medications, no chest pain, stable, monitored.  HTN: Controlled,  on antihypertensive medications.  Overweight/Obesity: No strict exercise routines, not on any weight loss program, neither on low  calorie diet.          Laury Romero MD  Cell: [phone]  Office: [phone]

## 2020-01-28 NOTE — PROGRESS NOTE ADULT - PROBLEM SELECTOR PLAN 1
Will continue current insulin regimen for now. Will continue monitoring FS, log, and FU.  Suggest to DC to rehab on current insulin regimen and FU endo 4 weeks.  Patient counseled for compliance with consistent low carb diet and exercise as tolerated outpatient. Will continue current insulin regimen for now. Will continue monitoring FS, log, and FU.

## 2020-01-28 NOTE — PROGRESS NOTE ADULT - SUBJECTIVE AND OBJECTIVE BOX
Patient is a 59y old  Male who presents with a chief complaint of 59M w/ n/v/d and hyperglycemia sent in from [facility] (28 Jan 2020 12:02)      INTERVAL HPI/OVERNIGHT EVENTS:  T(C): 36.4 (01-28-20 @ 21:35), Max: 36.6 (01-28-20 @ 05:52)  HR: 83 (01-28-20 @ 21:35) (76 - 83)  BP: 103/66 (01-28-20 @ 21:35) (103/66 - 122/69)  RR: 20 (01-28-20 @ 21:35) (18 - 20)  SpO2: 96% (01-28-20 @ 21:35) (95% - 98%)  Wt(kg): --  I&O's Summary    27 Jan 2020 07:01  -  28 Jan 2020 07:00  --------------------------------------------------------  IN: 820 mL / OUT: 2800 mL / NET: -1980 mL    28 Jan 2020 07:01  -  29 Jan 2020 00:50  --------------------------------------------------------  IN: 480 mL / OUT: 1650 mL / NET: -1170 mL        PAST MEDICAL & SURGICAL HISTORY:  Oxygen dependent  Gastroesophageal reflux disease, esophagitis presence not specified  Smoker  Bipolar 1 disorder  Coronary artery disease involving native coronary artery of native heart without angina pectoris  Type 2 diabetes mellitus without complication, with long-term current use of insulin  Pulmonary HTN  HLD (hyperlipidemia)  Stented coronary artery  COPD (chronic obstructive pulmonary disease)  No significant past surgical history      SOCIAL HISTORY  Alcohol:  Tobacco:  Illicit substance use:    FAMILY HISTORY:    REVIEW OF SYSTEMS:  CONSTITUTIONAL: No fever, weight loss, or fatigue  EYES: No eye pain, visual disturbances, or discharge  ENMT:  No difficulty hearing, tinnitus, vertigo; No sinus or throat pain  NECK: No pain or stiffness  RESPIRATORY: No cough, wheezing, chills or hemoptysis; No shortness of breath  CARDIOVASCULAR: No chest pain, palpitations, dizziness, or leg swelling  GASTROINTESTINAL: No abdominal or epigastric pain. No nausea, vomiting, or hematemesis; No diarrhea or constipation. No melena or hematochezia.  GENITOURINARY: No dysuria, frequency, hematuria, or incontinence  NEUROLOGICAL: No headaches, memory loss, loss of strength, numbness, or tremors  SKIN: No itching, burning, rashes, or lesions   LYMPH NODES: No enlarged glands  ENDOCRINE: No heat or cold intolerance; No hair loss  MUSCULOSKELETAL: No joint pain or swelling; No muscle, back, or extremity pain  PSYCHIATRIC: No depression, anxiety, mood swings, or difficulty sleeping  HEME/LYMPH: No easy bruising, or bleeding gums  ALLERY AND IMMUNOLOGIC: No hives or eczema    RADIOLOGY & ADDITIONAL TESTS:    Imaging Personally Reviewed:  [ ] YES  [ ] NO    Consultant(s) Notes Reviewed:  [ ] YES  [ ] NO    PHYSICAL EXAM:  GENERAL: NAD, well-groomed, well-developed  HEAD:  Atraumatic, Normocephalic  EYES: EOMI, PERRLA, conjunctiva and sclera clear  ENMT: No tonsillar erythema, exudates, or enlargement; Moist mucous membranes, Good dentition, No lesions  NECK: Supple, No JVD, Normal thyroid  NERVOUS SYSTEM:  Alert & Oriented X3, Good concentration; Motor Strength 5/5 B/L upper and lower extremities; DTRs 2+ intact and symmetric  CHEST/LUNG: Clear to percussion bilaterally; No rales, rhonchi, wheezing, or rubs  HEART: Regular rate and rhythm; No murmurs, rubs, or gallops  ABDOMEN: Soft, Nontender, Nondistended; Bowel sounds present  EXTREMITIES:  2+ Peripheral Pulses, No clubbing, cyanosis, or edema  LYMPH: No lymphadenopathy noted  SKIN: No rashes or lesions    LABS:              CAPILLARY BLOOD GLUCOSE      POCT Blood Glucose.: 191 mg/dL (28 Jan 2020 22:01)  POCT Blood Glucose.: 120 mg/dL (28 Jan 2020 17:20)  POCT Blood Glucose.: 158 mg/dL (28 Jan 2020 12:28)  POCT Blood Glucose.: 180 mg/dL (28 Jan 2020 08:27)            MEDICATIONS  (STANDING):  albuterol/ipratropium for Nebulization 3 milliLiter(s) Nebulizer every 6 hours  aspirin enteric coated 81 milliGRAM(s) Oral daily  atorvastatin 40 milliGRAM(s) Oral at bedtime  budesonide 160 MICROgram(s)/formoterol 4.5 MICROgram(s) Inhaler 2 Puff(s) Inhalation two times a day  dextrose 5%. 1000 milliLiter(s) (50 mL/Hr) IV Continuous <Continuous>  dextrose 50% Injectable 12.5 Gram(s) IV Push once  dextrose 50% Injectable 25 Gram(s) IV Push once  dextrose 50% Injectable 25 Gram(s) IV Push once  enoxaparin Injectable 40 milliGRAM(s) SubCutaneous daily  furosemide    Tablet 20 milliGRAM(s) Oral daily  insulin glargine Injectable (LANTUS) 75 Unit(s) SubCutaneous at bedtime  insulin lispro (HumaLOG) corrective regimen sliding scale   SubCutaneous three times a day before meals  insulin lispro (HumaLOG) corrective regimen sliding scale   SubCutaneous at bedtime  insulin lispro Injectable (HumaLOG) 30 Unit(s) SubCutaneous three times a day with meals  lisinopril 2.5 milliGRAM(s) Oral daily  melatonin 5 milliGRAM(s) Oral at bedtime  nicotine - Inhaler 1 Each Inhalation daily  pantoprazole    Tablet 40 milliGRAM(s) Oral daily  polyethylene glycol 3350 17 Gram(s) Oral every 12 hours  risperiDONE   Tablet 0.5 milliGRAM(s) Oral three times a day  senna 2 Tablet(s) Oral at bedtime  sertraline 50 milliGRAM(s) Oral at bedtime    MEDICATIONS  (PRN):  acetaminophen   Tablet .. 650 milliGRAM(s) Oral every 6 hours PRN Mild Pain (1 - 3)  aluminum hydroxide/magnesium hydroxide/simethicone Suspension 30 milliLiter(s) Oral every 4 hours PRN Dyspepsia  bisacodyl 5 milliGRAM(s) Oral every 12 hours PRN Constipation  dextrose 40% Gel 15 Gram(s) Oral once PRN Blood Glucose LESS THAN 70 milliGRAM(s)/deciliter  glucagon  Injectable 1 milliGRAM(s) IntraMuscular once PRN Glucose LESS THAN 70 milligrams/deciliter  oxycodone    5 mG/acetaminophen 325 mG 2 Tablet(s) Oral every 6 hours PRN Moderate Pain (4 - 6)      Care Discussed with Consultants/Other Providers [ ] YES  [ ] NO

## 2020-01-28 NOTE — PROGRESS NOTE ADULT - SUBJECTIVE AND OBJECTIVE BOX
Chief complaint  Patient is a 59y old  Male who presents with a chief complaint of 59M w/ n/v/d and hyperglycemia sent in from [facility] (27 Jan 2020 20:26)   Review of systems  Patient in bed, looks comfortable, no hypoglycemia.    Labs and Fingersticks  CAPILLARY BLOOD GLUCOSE      POCT Blood Glucose.: 180 mg/dL (28 Jan 2020 08:27)  POCT Blood Glucose.: 190 mg/dL (27 Jan 2020 21:58)  POCT Blood Glucose.: 292 mg/dL (27 Jan 2020 17:26)  POCT Blood Glucose.: 180 mg/dL (27 Jan 2020 12:18)                        Medications  MEDICATIONS  (STANDING):  albuterol/ipratropium for Nebulization 3 milliLiter(s) Nebulizer every 6 hours  aspirin enteric coated 81 milliGRAM(s) Oral daily  atorvastatin 40 milliGRAM(s) Oral at bedtime  budesonide 160 MICROgram(s)/formoterol 4.5 MICROgram(s) Inhaler 2 Puff(s) Inhalation two times a day  dextrose 5%. 1000 milliLiter(s) (50 mL/Hr) IV Continuous <Continuous>  dextrose 50% Injectable 12.5 Gram(s) IV Push once  dextrose 50% Injectable 25 Gram(s) IV Push once  dextrose 50% Injectable 25 Gram(s) IV Push once  enoxaparin Injectable 40 milliGRAM(s) SubCutaneous daily  furosemide    Tablet 20 milliGRAM(s) Oral daily  insulin glargine Injectable (LANTUS) 75 Unit(s) SubCutaneous at bedtime  insulin lispro (HumaLOG) corrective regimen sliding scale   SubCutaneous three times a day before meals  insulin lispro (HumaLOG) corrective regimen sliding scale   SubCutaneous at bedtime  insulin lispro Injectable (HumaLOG) 30 Unit(s) SubCutaneous three times a day with meals  lisinopril 2.5 milliGRAM(s) Oral daily  melatonin 5 milliGRAM(s) Oral at bedtime  nicotine - Inhaler 1 Each Inhalation daily  pantoprazole    Tablet 40 milliGRAM(s) Oral daily  polyethylene glycol 3350 17 Gram(s) Oral every 12 hours  risperiDONE   Tablet 0.5 milliGRAM(s) Oral three times a day  senna 2 Tablet(s) Oral at bedtime  sertraline 50 milliGRAM(s) Oral at bedtime      Physical Exam  General: Patient comfortable in bed  Vital Signs Last 12 Hrs  T(F): 97.9 (01-28-20 @ 05:52), Max: 97.9 (01-28-20 @ 05:52)  HR: 76 (01-28-20 @ 05:52) (76 - 76)  BP: 122/69 (01-28-20 @ 05:52) (122/69 - 122/69)  BP(mean): --  RR: 18 (01-28-20 @ 05:52) (18 - 18)  SpO2: 98% (01-28-20 @ 05:52) (98% - 98%)  Neck: No palpable thyroid nodules.  CVS: S1S2, No murmurs  Respiratory: No wheezing, no crepitations  GI: Abdomen soft, bowel sounds positive  Musculoskeletal:  edema lower extremities.   Skin: No skin rashes, no ecchymosis    Diagnostics Chief complaint  Patient is a 59y old  Male who presents with a chief complaint  of 59M w/ n/v/d and hyperglycemia sent in from [facility] (27 Jan 2020 20:26)   Review of systems  Patient in bed, looks comfortable, no hypoglycemia.    Labs and Fingersticks  CAPILLARY BLOOD GLUCOSE      POCT Blood Glucose.: 180 mg/dL (28 Jan 2020 08:27)  POCT Blood Glucose.: 190 mg/dL (27 Jan 2020 21:58)  POCT Blood Glucose.: 292 mg/dL (27 Jan 2020 17:26)  POCT Blood Glucose.: 180 mg/dL (27 Jan 2020 12:18)                        Medications  MEDICATIONS  (STANDING):  albuterol/ipratropium for Nebulization 3 milliLiter(s) Nebulizer every 6 hours  aspirin enteric coated 81 milliGRAM(s) Oral daily  atorvastatin 40 milliGRAM(s) Oral at bedtime  budesonide 160 MICROgram(s)/formoterol 4.5 MICROgram(s) Inhaler 2 Puff(s) Inhalation two times a day  dextrose 5%. 1000 milliLiter(s) (50 mL/Hr) IV Continuous <Continuous>  dextrose 50% Injectable 12.5 Gram(s) IV Push once  dextrose 50% Injectable 25 Gram(s) IV Push once  dextrose 50% Injectable 25 Gram(s) IV Push once  enoxaparin Injectable 40 milliGRAM(s) SubCutaneous daily  furosemide    Tablet 20 milliGRAM(s) Oral daily  insulin glargine Injectable (LANTUS) 75 Unit(s) SubCutaneous at bedtime  insulin lispro (HumaLOG) corrective regimen sliding scale   SubCutaneous three times a day before meals  insulin lispro (HumaLOG) corrective regimen sliding scale   SubCutaneous at bedtime  insulin lispro Injectable (HumaLOG) 30 Unit(s) SubCutaneous three times a day with meals  lisinopril 2.5 milliGRAM(s) Oral daily  melatonin 5 milliGRAM(s) Oral at bedtime  nicotine - Inhaler 1 Each Inhalation daily  pantoprazole    Tablet 40 milliGRAM(s) Oral daily  polyethylene glycol 3350 17 Gram(s) Oral every 12 hours  risperiDONE   Tablet 0.5 milliGRAM(s) Oral three times a day  senna 2 Tablet(s) Oral at bedtime  sertraline 50 milliGRAM(s) Oral at bedtime      Physical Exam  General: Patient comfortable in bed  Vital Signs Last 12 Hrs  T(F): 97.9 (01-28-20 @ 05:52), Max: 97.9 (01-28-20 @ 05:52)  HR: 76 (01-28-20 @ 05:52) (76 - 76)  BP: 122/69 (01-28-20 @ 05:52) (122/69 - 122/69)  BP(mean): --  RR: 18 (01-28-20 @ 05:52) (18 - 18)  SpO2: 98% (01-28-20 @ 05:52) (98% - 98%)  Neck: No palpable thyroid nodules.  CVS: S1S2, No murmurs  Respiratory: No wheezing, no crepitations  GI: Abdomen soft, bowel sounds positive  Musculoskeletal:  edema lower extremities.   Skin: No skin rashes, no ecchymosis    Diagnostics

## 2020-01-29 LAB
GLUCOSE BLDC GLUCOMTR-MCNC: 127 MG/DL — HIGH (ref 70–99)
GLUCOSE BLDC GLUCOMTR-MCNC: 129 MG/DL — HIGH (ref 70–99)
GLUCOSE BLDC GLUCOMTR-MCNC: 166 MG/DL — HIGH (ref 70–99)
GLUCOSE BLDC GLUCOMTR-MCNC: 169 MG/DL — HIGH (ref 70–99)

## 2020-01-29 RX ADMIN — OXYCODONE AND ACETAMINOPHEN 2 TABLET(S): 5; 325 TABLET ORAL at 00:25

## 2020-01-29 RX ADMIN — Medication 30 UNIT(S): at 13:23

## 2020-01-29 RX ADMIN — BUDESONIDE AND FORMOTEROL FUMARATE DIHYDRATE 2 PUFF(S): 160; 4.5 AEROSOL RESPIRATORY (INHALATION) at 05:54

## 2020-01-29 RX ADMIN — Medication 81 MILLIGRAM(S): at 13:23

## 2020-01-29 RX ADMIN — POLYETHYLENE GLYCOL 3350 17 GRAM(S): 17 POWDER, FOR SOLUTION ORAL at 05:55

## 2020-01-29 RX ADMIN — LISINOPRIL 2.5 MILLIGRAM(S): 2.5 TABLET ORAL at 05:54

## 2020-01-29 RX ADMIN — Medication 30 UNIT(S): at 08:26

## 2020-01-29 RX ADMIN — OXYCODONE AND ACETAMINOPHEN 2 TABLET(S): 5; 325 TABLET ORAL at 22:57

## 2020-01-29 RX ADMIN — Medication 3 MILLILITER(S): at 05:53

## 2020-01-29 RX ADMIN — Medication 1 EACH: at 16:14

## 2020-01-29 RX ADMIN — Medication 1: at 08:26

## 2020-01-29 RX ADMIN — Medication 5 MILLIGRAM(S): at 22:26

## 2020-01-29 RX ADMIN — RISPERIDONE 0.5 MILLIGRAM(S): 4 TABLET ORAL at 22:27

## 2020-01-29 RX ADMIN — OXYCODONE AND ACETAMINOPHEN 2 TABLET(S): 5; 325 TABLET ORAL at 08:25

## 2020-01-29 RX ADMIN — OXYCODONE AND ACETAMINOPHEN 2 TABLET(S): 5; 325 TABLET ORAL at 22:27

## 2020-01-29 RX ADMIN — Medication 3 MILLILITER(S): at 19:00

## 2020-01-29 RX ADMIN — RISPERIDONE 0.5 MILLIGRAM(S): 4 TABLET ORAL at 13:23

## 2020-01-29 RX ADMIN — ENOXAPARIN SODIUM 40 MILLIGRAM(S): 100 INJECTION SUBCUTANEOUS at 13:23

## 2020-01-29 RX ADMIN — OXYCODONE AND ACETAMINOPHEN 2 TABLET(S): 5; 325 TABLET ORAL at 09:38

## 2020-01-29 RX ADMIN — OXYCODONE AND ACETAMINOPHEN 2 TABLET(S): 5; 325 TABLET ORAL at 15:54

## 2020-01-29 RX ADMIN — RISPERIDONE 0.5 MILLIGRAM(S): 4 TABLET ORAL at 05:54

## 2020-01-29 RX ADMIN — ATORVASTATIN CALCIUM 40 MILLIGRAM(S): 80 TABLET, FILM COATED ORAL at 22:26

## 2020-01-29 RX ADMIN — Medication 30 UNIT(S): at 17:51

## 2020-01-29 RX ADMIN — SERTRALINE 50 MILLIGRAM(S): 25 TABLET, FILM COATED ORAL at 22:27

## 2020-01-29 RX ADMIN — SENNA PLUS 2 TABLET(S): 8.6 TABLET ORAL at 22:26

## 2020-01-29 RX ADMIN — Medication 20 MILLIGRAM(S): at 05:54

## 2020-01-29 RX ADMIN — INSULIN GLARGINE 75 UNIT(S): 100 INJECTION, SOLUTION SUBCUTANEOUS at 22:27

## 2020-01-29 RX ADMIN — PANTOPRAZOLE SODIUM 40 MILLIGRAM(S): 20 TABLET, DELAYED RELEASE ORAL at 13:23

## 2020-01-29 RX ADMIN — Medication 3 MILLILITER(S): at 13:23

## 2020-01-29 RX ADMIN — OXYCODONE AND ACETAMINOPHEN 2 TABLET(S): 5; 325 TABLET ORAL at 14:32

## 2020-01-29 RX ADMIN — BUDESONIDE AND FORMOTEROL FUMARATE DIHYDRATE 2 PUFF(S): 160; 4.5 AEROSOL RESPIRATORY (INHALATION) at 19:00

## 2020-01-29 NOTE — CONSULT NOTE ADULT - PROBLEM SELECTOR PROBLEM 2
Type 2 diabetes mellitus with hyperglycemia, with long-term current use of insulin Abdomen soft, non-tender, no guarding.  No CVA tenderness bilaterally.

## 2020-01-29 NOTE — PROGRESS NOTE ADULT - PROBLEM SELECTOR PLAN 1
Will continue current insulin regimen for now. Will continue monitoring FS, log, and FU.  Suggest to DC on current insulin regimen and FU endo 4 weeks.  Patient counseled for compliance with consistent low carb diet and exercise as tolerated outpatient. Will continue current insulin regimen for now. Will continue monitoring FS, log, and FU.

## 2020-01-29 NOTE — PROGRESS NOTE ADULT - ASSESSMENT
Assessment  DMT2: 59y Male with DM T2 with hyperglycemia, A1C 10.4%, on basal bolus insulin, blood sugars much improved, FS trending within acceptable range, no hypoglycemic episodes. Patient is medically cleared for DC, awaiting rehab placement for over a week.  No acute events, patient is eating full meals, alert and comfortable.  CAD: on medications, no chest pain, stable, monitored.  HTN: Controlled,  on antihypertensive medications.  Overweight/Obesity: No strict exercise routines, not on any weight loss program, neither on low  calorie diet.          Laury Romero MD  Cell: [phone]  Office: [phone] Assessment  DMT2: 59y Male with DM T2 with hyperglycemia, A1C 10.4%, on basal bolus insulin, blood sugars much improved, FS trending within acceptable range, no hypoglycemic episodes.  Patient is medically cleared for DC, awaiting rehab placement for over a week.  No acute events, patient is eating full meals, alert and comfortable.  CAD: on medications, no chest pain, stable, monitored.  HTN: Controlled,  on antihypertensive medications.  Overweight/Obesity: No strict exercise routines, not on any weight loss program, neither on low  calorie diet.          Laury Romero MD  Cell: [phone]  Office: [phone]

## 2020-01-29 NOTE — PROGRESS NOTE ADULT - SUBJECTIVE AND OBJECTIVE BOX
Patient is a 59y old  Male who presents with a chief complaint of 59M w/ n/v/d and hyperglycemia sent in from [facility] (29 Jan 2020 15:54)    pt. seen and examined    INTERVAL HPI/OVERNIGHT EVENTS:  T(C): 36.7 (01-29-20 @ 19:56), Max: 36.7 (01-29-20 @ 19:56)  HR: 71 (01-29-20 @ 19:56) (71 - 97)  BP: 120/71 (01-29-20 @ 19:56) (103/66 - 120/71)  RR: 18 (01-29-20 @ 19:56) (18 - 20)  SpO2: 97% (01-29-20 @ 19:56) (91% - 97%)  Wt(kg): --  I&O's Summary    28 Jan 2020 07:01  -  29 Jan 2020 07:00  --------------------------------------------------------  IN: 480 mL / OUT: 2000 mL / NET: -1520 mL    29 Jan 2020 07:01  -  29 Jan 2020 20:43  --------------------------------------------------------  IN: 720 mL / OUT: 800 mL / NET: -80 mL        PAST MEDICAL & SURGICAL HISTORY:  Oxygen dependent  Gastroesophageal reflux disease, esophagitis presence not specified  Smoker  Bipolar 1 disorder  Coronary artery disease involving native coronary artery of native heart without angina pectoris  Type 2 diabetes mellitus without complication, with long-term current use of insulin  Pulmonary HTN  HLD (hyperlipidemia)  Stented coronary artery  COPD (chronic obstructive pulmonary disease)  No significant past surgical history      SOCIAL HISTORY  Alcohol:  Tobacco:  Illicit substance use:    FAMILY HISTORY:    REVIEW OF SYSTEMS:  CONSTITUTIONAL: No fever, weight loss, or fatigue  EYES: No eye pain, visual disturbances, or discharge  ENMT:  No difficulty hearing, tinnitus, vertigo; No sinus or throat pain  NECK: No pain or stiffness  RESPIRATORY: No cough, wheezing, chills or hemoptysis; No shortness of breath  CARDIOVASCULAR: No chest pain, palpitations, dizziness, or leg swelling  GASTROINTESTINAL: No abdominal or epigastric pain. No nausea, vomiting, or hematemesis; No diarrhea or constipation. No melena or hematochezia.  GENITOURINARY: No dysuria, frequency, hematuria, or incontinence  NEUROLOGICAL: No headaches, memory loss, loss of strength, numbness, or tremors  SKIN: No itching, burning, rashes, or lesions   LYMPH NODES: No enlarged glands  ENDOCRINE: No heat or cold intolerance; No hair loss  MUSCULOSKELETAL: No joint pain or swelling; No muscle, back, or extremity pain  PSYCHIATRIC: No depression, anxiety, mood swings, or difficulty sleeping  HEME/LYMPH: No easy bruising, or bleeding gums  ALLERY AND IMMUNOLOGIC: No hives or eczema    RADIOLOGY & ADDITIONAL TESTS:    Imaging Personally Reviewed:  [ ] YES  [ ] NO    Consultant(s) Notes Reviewed:  [ ] YES  [ ] NO    PHYSICAL EXAM:  GENERAL: NAD, well-groomed, well-developed  HEAD:  Atraumatic, Normocephalic  EYES: EOMI, PERRLA, conjunctiva and sclera clear  ENMT: No tonsillar erythema, exudates, or enlargement; Moist mucous membranes, Good dentition, No lesions  NECK: Supple, No JVD, Normal thyroid  NERVOUS SYSTEM:  Alert & Oriented X3, Good concentration; Motor Strength 5/5 B/L upper and lower extremities; DTRs 2+ intact and symmetric  CHEST/LUNG: Clear to percussion bilaterally; No rales, rhonchi, wheezing, or rubs  HEART: Regular rate and rhythm; No murmurs, rubs, or gallops  ABDOMEN: Soft, Nontender, Nondistended; Bowel sounds present  EXTREMITIES:  2+ Peripheral Pulses, No clubbing, cyanosis, or edema  LYMPH: No lymphadenopathy noted  SKIN: No rashes or lesions    LABS:              CAPILLARY BLOOD GLUCOSE      POCT Blood Glucose.: 127 mg/dL (29 Jan 2020 17:22)  POCT Blood Glucose.: 129 mg/dL (29 Jan 2020 12:28)  POCT Blood Glucose.: 166 mg/dL (29 Jan 2020 08:11)  POCT Blood Glucose.: 191 mg/dL (28 Jan 2020 22:01)            MEDICATIONS  (STANDING):  albuterol/ipratropium for Nebulization 3 milliLiter(s) Nebulizer every 6 hours  aspirin enteric coated 81 milliGRAM(s) Oral daily  atorvastatin 40 milliGRAM(s) Oral at bedtime  budesonide 160 MICROgram(s)/formoterol 4.5 MICROgram(s) Inhaler 2 Puff(s) Inhalation two times a day  dextrose 5%. 1000 milliLiter(s) (50 mL/Hr) IV Continuous <Continuous>  dextrose 50% Injectable 12.5 Gram(s) IV Push once  dextrose 50% Injectable 25 Gram(s) IV Push once  dextrose 50% Injectable 25 Gram(s) IV Push once  enoxaparin Injectable 40 milliGRAM(s) SubCutaneous daily  furosemide    Tablet 20 milliGRAM(s) Oral daily  insulin glargine Injectable (LANTUS) 75 Unit(s) SubCutaneous at bedtime  insulin lispro (HumaLOG) corrective regimen sliding scale   SubCutaneous three times a day before meals  insulin lispro (HumaLOG) corrective regimen sliding scale   SubCutaneous at bedtime  insulin lispro Injectable (HumaLOG) 30 Unit(s) SubCutaneous three times a day with meals  lisinopril 2.5 milliGRAM(s) Oral daily  melatonin 5 milliGRAM(s) Oral at bedtime  nicotine - Inhaler 1 Each Inhalation daily  pantoprazole    Tablet 40 milliGRAM(s) Oral daily  polyethylene glycol 3350 17 Gram(s) Oral every 12 hours  risperiDONE   Tablet 0.5 milliGRAM(s) Oral three times a day  senna 2 Tablet(s) Oral at bedtime  sertraline 50 milliGRAM(s) Oral at bedtime    MEDICATIONS  (PRN):  acetaminophen   Tablet .. 650 milliGRAM(s) Oral every 6 hours PRN Mild Pain (1 - 3)  aluminum hydroxide/magnesium hydroxide/simethicone Suspension 30 milliLiter(s) Oral every 4 hours PRN Dyspepsia  bisacodyl 5 milliGRAM(s) Oral every 12 hours PRN Constipation  dextrose 40% Gel 15 Gram(s) Oral once PRN Blood Glucose LESS THAN 70 milliGRAM(s)/deciliter  glucagon  Injectable 1 milliGRAM(s) IntraMuscular once PRN Glucose LESS THAN 70 milligrams/deciliter  oxycodone    5 mG/acetaminophen 325 mG 2 Tablet(s) Oral every 6 hours PRN Moderate Pain (4 - 6)      Care Discussed with Consultants/Other Providers [ ] YES  [ ] NO

## 2020-01-29 NOTE — PROGRESS NOTE ADULT - SUBJECTIVE AND OBJECTIVE BOX
Chief complaint  Patient is a 59y old  Male who presents with a chief complaint of 59M w/ n/v/d and hyperglycemia sent in from [facility] (28 Jan 2020 20:50)   Review of systems  Patient in bed, looks comfortable, no hypoglycemia.    Labs and Fingersticks  CAPILLARY BLOOD GLUCOSE      POCT Blood Glucose.: 129 mg/dL (29 Jan 2020 12:28)  POCT Blood Glucose.: 166 mg/dL (29 Jan 2020 08:11)  POCT Blood Glucose.: 191 mg/dL (28 Jan 2020 22:01)  POCT Blood Glucose.: 120 mg/dL (28 Jan 2020 17:20)                        Medications  MEDICATIONS  (STANDING):  albuterol/ipratropium for Nebulization 3 milliLiter(s) Nebulizer every 6 hours  aspirin enteric coated 81 milliGRAM(s) Oral daily  atorvastatin 40 milliGRAM(s) Oral at bedtime  budesonide 160 MICROgram(s)/formoterol 4.5 MICROgram(s) Inhaler 2 Puff(s) Inhalation two times a day  dextrose 5%. 1000 milliLiter(s) (50 mL/Hr) IV Continuous <Continuous>  dextrose 50% Injectable 12.5 Gram(s) IV Push once  dextrose 50% Injectable 25 Gram(s) IV Push once  dextrose 50% Injectable 25 Gram(s) IV Push once  enoxaparin Injectable 40 milliGRAM(s) SubCutaneous daily  furosemide    Tablet 20 milliGRAM(s) Oral daily  insulin glargine Injectable (LANTUS) 75 Unit(s) SubCutaneous at bedtime  insulin lispro (HumaLOG) corrective regimen sliding scale   SubCutaneous three times a day before meals  insulin lispro (HumaLOG) corrective regimen sliding scale   SubCutaneous at bedtime  insulin lispro Injectable (HumaLOG) 30 Unit(s) SubCutaneous three times a day with meals  lisinopril 2.5 milliGRAM(s) Oral daily  melatonin 5 milliGRAM(s) Oral at bedtime  nicotine - Inhaler 1 Each Inhalation daily  pantoprazole    Tablet 40 milliGRAM(s) Oral daily  polyethylene glycol 3350 17 Gram(s) Oral every 12 hours  risperiDONE   Tablet 0.5 milliGRAM(s) Oral three times a day  senna 2 Tablet(s) Oral at bedtime  sertraline 50 milliGRAM(s) Oral at bedtime      Physical Exam  General: Patient comfortable in bed  Vital Signs Last 12 Hrs  T(F): 97.8 (01-29-20 @ 13:20), Max: 97.8 (01-29-20 @ 13:20)  HR: 97 (01-29-20 @ 13:20) (80 - 97)  BP: 115/76 (01-29-20 @ 13:20) (109/66 - 115/76)  BP(mean): --  RR: 18 (01-29-20 @ 13:20) (18 - 18)  SpO2: 91% (01-29-20 @ 13:20) (91% - 96%)  Neck: No palpable thyroid nodules.  CVS: S1S2, No murmurs  Respiratory: No wheezing, no crepitations  GI: Abdomen soft, bowel sounds positive  Musculoskeletal:  edema lower extremities.   Skin: No skin rashes, no ecchymosis    Diagnostics Chief complaint  Patient is a 59y old  Male who presents with a chief complaint of 59M w/ n/v/d and hyperglycemia sent in from [facility] (28 Jan 2020 20:50)   Review of systems  Patient in bed, looks comfortable, no hypoglycemia.    Labs and Fingersticks  CAPILLARY BLOOD GLUCOSE      POCT Blood Glucose.: 129 mg/dL (29 Jan 2020 12:28)  POCT Blood Glucose.: 166 mg/dL (29 Jan 2020 08:11)  POCT Blood Glucose.: 191 mg/dL (28 Jan 2020 22:01)  POCT Blood Glucose.: 120 mg/dL (28 Jan 2020 17:20)                        Medications  MEDICATIONS  (STANDING):  albuterol/ipratropium for Nebulization 3 milliLiter(s) Nebulizer every 6 hours  aspirin enteric coated 81 milliGRAM(s) Oral daily  atorvastatin 40 milliGRAM(s) Oral at bedtime  budesonide 160 MICROgram(s)/formoterol 4.5 MICROgram(s) Inhaler 2 Puff(s) Inhalation two times a day  dextrose 5%. 1000 milliLiter(s) (50 mL/Hr) IV Continuous <Continuous>  dextrose 50% Injectable 12.5 Gram(s) IV Push once  dextrose 50% Injectable 25 Gram(s) IV Push once  dextrose 50% Injectable 25 Gram(s) IV Push once  enoxaparin Injectable 40 milliGRAM(s) SubCutaneous daily  furosemide    Tablet 20 milliGRAM(s) Oral daily  insulin glargine Injectable (LANTUS) 75 Unit(s) SubCutaneous at bedtime  insulin lispro (HumaLOG) corrective regimen sliding scale   SubCutaneous three times a day before meals  insulin lispro (HumaLOG) corrective regimen sliding scale   SubCutaneous at bedtime  insulin lispro Injectable (HumaLOG) 30 Unit(s) SubCutaneous three times a day with meals  lisinopril 2.5 milliGRAM(s) Oral daily  melatonin 5 milliGRAM(s) Oral at bedtime  nicotine - Inhaler 1 Each Inhalation daily  pantoprazole    Tablet 40 milliGRAM(s) Oral daily  polyethylene glycol 3350 17 Gram(s) Oral every 12 hours  risperiDONE   Tablet 0.5 milliGRAM(s) Oral three times a day  senna 2 Tablet(s) Oral at bedtime  sertraline 50 milliGRAM(s) Oral at bedtime      Physical Exam  General: Patient comfortable in bed  Vital Signs Last 12 Hrs  T(F): 97.8 (01-29-20 @ 13:20), Max: 97.8 (01-29-20 @ 13:20)  HR: 97 (01-29-20 @ 13:20) (80 - 97)  BP: 115/76 (01-29-20 @ 13:20) (109/66 - 115/76)  BP(mean): --  RR: 18 (01-29-20 @ 13:20) (18 - 18)  SpO2: 91% (01-29-20 @ 13:20) (91% - 96%)  Neck: No palpable thyroid nodules.  CVS: S1S2, No murmurs  Respiratory: No wheezing, no crepitations  GI: Abdomen soft, bowel sounds positive  Musculoskeletal:  edema lower extremities.   Skin: No skin rashes, no ecchymosis    Diagnostics

## 2020-01-30 LAB
GLUCOSE BLDC GLUCOMTR-MCNC: 119 MG/DL — HIGH (ref 70–99)
GLUCOSE BLDC GLUCOMTR-MCNC: 136 MG/DL — HIGH (ref 70–99)
GLUCOSE BLDC GLUCOMTR-MCNC: 138 MG/DL — HIGH (ref 70–99)
GLUCOSE BLDC GLUCOMTR-MCNC: 167 MG/DL — HIGH (ref 70–99)

## 2020-01-30 RX ORDER — NYSTATIN CREAM 100000 [USP'U]/G
1 CREAM TOPICAL
Refills: 0 | Status: DISCONTINUED | OUTPATIENT
Start: 2020-01-30 | End: 2020-03-13

## 2020-01-30 RX ADMIN — Medication 3 MILLILITER(S): at 00:05

## 2020-01-30 RX ADMIN — ENOXAPARIN SODIUM 40 MILLIGRAM(S): 100 INJECTION SUBCUTANEOUS at 11:49

## 2020-01-30 RX ADMIN — RISPERIDONE 0.5 MILLIGRAM(S): 4 TABLET ORAL at 06:42

## 2020-01-30 RX ADMIN — Medication 650 MILLIGRAM(S): at 20:45

## 2020-01-30 RX ADMIN — Medication 3 MILLILITER(S): at 17:15

## 2020-01-30 RX ADMIN — Medication 20 MILLIGRAM(S): at 06:42

## 2020-01-30 RX ADMIN — LISINOPRIL 2.5 MILLIGRAM(S): 2.5 TABLET ORAL at 06:42

## 2020-01-30 RX ADMIN — OXYCODONE AND ACETAMINOPHEN 2 TABLET(S): 5; 325 TABLET ORAL at 09:47

## 2020-01-30 RX ADMIN — RISPERIDONE 0.5 MILLIGRAM(S): 4 TABLET ORAL at 13:01

## 2020-01-30 RX ADMIN — Medication 30 UNIT(S): at 17:27

## 2020-01-30 RX ADMIN — INSULIN GLARGINE 75 UNIT(S): 100 INJECTION, SOLUTION SUBCUTANEOUS at 21:59

## 2020-01-30 RX ADMIN — SERTRALINE 50 MILLIGRAM(S): 25 TABLET, FILM COATED ORAL at 21:36

## 2020-01-30 RX ADMIN — Medication 650 MILLIGRAM(S): at 11:50

## 2020-01-30 RX ADMIN — Medication 30 UNIT(S): at 09:02

## 2020-01-30 RX ADMIN — OXYCODONE AND ACETAMINOPHEN 2 TABLET(S): 5; 325 TABLET ORAL at 16:32

## 2020-01-30 RX ADMIN — Medication 30 UNIT(S): at 12:54

## 2020-01-30 RX ADMIN — RISPERIDONE 0.5 MILLIGRAM(S): 4 TABLET ORAL at 21:36

## 2020-01-30 RX ADMIN — SENNA PLUS 2 TABLET(S): 8.6 TABLET ORAL at 21:36

## 2020-01-30 RX ADMIN — OXYCODONE AND ACETAMINOPHEN 2 TABLET(S): 5; 325 TABLET ORAL at 16:01

## 2020-01-30 RX ADMIN — PANTOPRAZOLE SODIUM 40 MILLIGRAM(S): 20 TABLET, DELAYED RELEASE ORAL at 11:49

## 2020-01-30 RX ADMIN — Medication 5 MILLIGRAM(S): at 21:36

## 2020-01-30 RX ADMIN — OXYCODONE AND ACETAMINOPHEN 2 TABLET(S): 5; 325 TABLET ORAL at 10:15

## 2020-01-30 RX ADMIN — Medication 650 MILLIGRAM(S): at 19:59

## 2020-01-30 RX ADMIN — ATORVASTATIN CALCIUM 40 MILLIGRAM(S): 80 TABLET, FILM COATED ORAL at 21:36

## 2020-01-30 RX ADMIN — Medication 650 MILLIGRAM(S): at 12:20

## 2020-01-30 RX ADMIN — Medication 81 MILLIGRAM(S): at 11:49

## 2020-01-30 RX ADMIN — NYSTATIN CREAM 1 APPLICATION(S): 100000 CREAM TOPICAL at 21:40

## 2020-01-30 RX ADMIN — OXYCODONE AND ACETAMINOPHEN 2 TABLET(S): 5; 325 TABLET ORAL at 22:45

## 2020-01-30 RX ADMIN — OXYCODONE AND ACETAMINOPHEN 2 TABLET(S): 5; 325 TABLET ORAL at 21:59

## 2020-01-30 RX ADMIN — Medication 3 MILLILITER(S): at 06:42

## 2020-01-30 RX ADMIN — BUDESONIDE AND FORMOTEROL FUMARATE DIHYDRATE 2 PUFF(S): 160; 4.5 AEROSOL RESPIRATORY (INHALATION) at 06:43

## 2020-01-30 RX ADMIN — BUDESONIDE AND FORMOTEROL FUMARATE DIHYDRATE 2 PUFF(S): 160; 4.5 AEROSOL RESPIRATORY (INHALATION) at 17:15

## 2020-01-30 RX ADMIN — Medication 3 MILLILITER(S): at 11:49

## 2020-01-30 NOTE — PROGRESS NOTE ADULT - ASSESSMENT
Assessment  DMT2: 59y Male with DM T2 with hyperglycemia, A1C 10.4%, on basal bolus insulin, blood sugars much improved and trending within acceptable range, no hypoglycemic episodes. Patient is medically cleared for DC, eating full meals, alert and comfortable, still awaiting rehab placement.  CAD: on medications, no chest pain, stable, monitored.  HTN: Controlled,  on antihypertensive medications.  Overweight/Obesity: No strict exercise routines, not on any weight loss program, neither on low  calorie diet.          Laury Romero MD  Cell: [phone]  Office: [phone] Assessment  DMT2: 59y Male with DM T2 with hyperglycemia, A1C 10.4%, on basal bolus insulin, blood sugars much improved and trending within acceptable range, no hypoglycemic episodes. Patient is medically cleared for DC,  eating full meals, alert and comfortable, still awaiting rehab placement.  CAD: on medications, no chest pain, stable, monitored.  HTN: Controlled,  on antihypertensive medications.  Overweight/Obesity: No strict exercise routines, not on any weight loss program, neither on low  calorie diet.          Laury Romero MD  Cell: [phone]  Office: [phone]

## 2020-01-30 NOTE — PROGRESS NOTE ADULT - SUBJECTIVE AND OBJECTIVE BOX
Chief complaint  Patient is a 59y old  Male who presents with a chief complaint of 59M w/ n/v/d and hyperglycemia sent in from [facility] (29 Jan 2020 20:43)   Review of systems  Patient in bed resting comfortably, no hypoglycemia.    Labs and Fingersticks  CAPILLARY BLOOD GLUCOSE      POCT Blood Glucose.: 119 mg/dL (30 Jan 2020 12:15)  POCT Blood Glucose.: 136 mg/dL (30 Jan 2020 08:09)  POCT Blood Glucose.: 169 mg/dL (29 Jan 2020 21:32)  POCT Blood Glucose.: 127 mg/dL (29 Jan 2020 17:22)                        Medications  MEDICATIONS  (STANDING):  albuterol/ipratropium for Nebulization 3 milliLiter(s) Nebulizer every 6 hours  aspirin enteric coated 81 milliGRAM(s) Oral daily  atorvastatin 40 milliGRAM(s) Oral at bedtime  budesonide 160 MICROgram(s)/formoterol 4.5 MICROgram(s) Inhaler 2 Puff(s) Inhalation two times a day  dextrose 5%. 1000 milliLiter(s) (50 mL/Hr) IV Continuous <Continuous>  dextrose 50% Injectable 12.5 Gram(s) IV Push once  dextrose 50% Injectable 25 Gram(s) IV Push once  dextrose 50% Injectable 25 Gram(s) IV Push once  enoxaparin Injectable 40 milliGRAM(s) SubCutaneous daily  furosemide    Tablet 20 milliGRAM(s) Oral daily  insulin glargine Injectable (LANTUS) 75 Unit(s) SubCutaneous at bedtime  insulin lispro (HumaLOG) corrective regimen sliding scale   SubCutaneous three times a day before meals  insulin lispro (HumaLOG) corrective regimen sliding scale   SubCutaneous at bedtime  insulin lispro Injectable (HumaLOG) 30 Unit(s) SubCutaneous three times a day with meals  lisinopril 2.5 milliGRAM(s) Oral daily  melatonin 5 milliGRAM(s) Oral at bedtime  nicotine - Inhaler 1 Each Inhalation daily  pantoprazole    Tablet 40 milliGRAM(s) Oral daily  polyethylene glycol 3350 17 Gram(s) Oral every 12 hours  risperiDONE   Tablet 0.5 milliGRAM(s) Oral three times a day  senna 2 Tablet(s) Oral at bedtime  sertraline 50 milliGRAM(s) Oral at bedtime      Physical Exam  General: Patient comfortable in bed  Vital Signs Last 12 Hrs  T(F): 98 (01-30-20 @ 04:23), Max: 98 (01-30-20 @ 04:23)  HR: 76 (01-30-20 @ 04:23) (76 - 76)  BP: 117/68 (01-30-20 @ 04:23) (117/68 - 117/68)  BP(mean): --  RR: 18 (01-30-20 @ 04:23) (18 - 18)  SpO2: 98% (01-30-20 @ 04:23) (98% - 98%)  Neck: No palpable thyroid nodules.  CVS: S1S2, No murmurs  Respiratory: No wheezing, no crepitations  GI: Abdomen soft, bowel sounds positive  Musculoskeletal:  edema lower extremities.   Skin: No skin rashes, no ecchymosis    Diagnostics Chief complaint  Patient is a 59y old  Male who presents with a chief complaint of 59M w/ n/v/d and hyperglycemia sent in from [facility] (29 Jan 2020 20:43)   Review of systems  Patient in bed resting comfortably,  no hypoglycemia.    Labs and Fingersticks  CAPILLARY BLOOD GLUCOSE      POCT Blood Glucose.: 119 mg/dL (30 Jan 2020 12:15)  POCT Blood Glucose.: 136 mg/dL (30 Jan 2020 08:09)  POCT Blood Glucose.: 169 mg/dL (29 Jan 2020 21:32)  POCT Blood Glucose.: 127 mg/dL (29 Jan 2020 17:22)    Medications  MEDICATIONS  (STANDING):  albuterol/ipratropium for Nebulization 3 milliLiter(s) Nebulizer every 6 hours  aspirin enteric coated 81 milliGRAM(s) Oral daily  atorvastatin 40 milliGRAM(s) Oral at bedtime  budesonide 160 MICROgram(s)/formoterol 4.5 MICROgram(s) Inhaler 2 Puff(s) Inhalation two times a day  dextrose 5%. 1000 milliLiter(s) (50 mL/Hr) IV Continuous <Continuous>  dextrose 50% Injectable 12.5 Gram(s) IV Push once  dextrose 50% Injectable 25 Gram(s) IV Push once  dextrose 50% Injectable 25 Gram(s) IV Push once  enoxaparin Injectable 40 milliGRAM(s) SubCutaneous daily  furosemide    Tablet 20 milliGRAM(s) Oral daily  insulin glargine Injectable (LANTUS) 75 Unit(s) SubCutaneous at bedtime  insulin lispro (HumaLOG) corrective regimen sliding scale   SubCutaneous three times a day before meals  insulin lispro (HumaLOG) corrective regimen sliding scale   SubCutaneous at bedtime  insulin lispro Injectable (HumaLOG) 30 Unit(s) SubCutaneous three times a day with meals  lisinopril 2.5 milliGRAM(s) Oral daily  melatonin 5 milliGRAM(s) Oral at bedtime  nicotine - Inhaler 1 Each Inhalation daily  pantoprazole    Tablet 40 milliGRAM(s) Oral daily  polyethylene glycol 3350 17 Gram(s) Oral every 12 hours  risperiDONE   Tablet 0.5 milliGRAM(s) Oral three times a day  senna 2 Tablet(s) Oral at bedtime  sertraline 50 milliGRAM(s) Oral at bedtime      Physical Exam  General: Patient comfortable in bed  Vital Signs Last 12 Hrs  T(F): 98 (01-30-20 @ 04:23), Max: 98 (01-30-20 @ 04:23)  HR: 76 (01-30-20 @ 04:23) (76 - 76)  BP: 117/68 (01-30-20 @ 04:23) (117/68 - 117/68)  BP(mean): --  RR: 18 (01-30-20 @ 04:23) (18 - 18)  SpO2: 98% (01-30-20 @ 04:23) (98% - 98%)  Neck: No palpable thyroid nodules.  CVS: S1S2, No murmurs  Respiratory: No wheezing, no crepitations  GI: Abdomen soft, bowel sounds positive  Musculoskeletal:  edema lower extremities.   Skin: No skin rashes, no ecchymosis    Diagnostics

## 2020-01-30 NOTE — PROGRESS NOTE ADULT - PROBLEM SELECTOR PLAN 1
Will continue current insulin regimen for now. Will continue monitoring FS, log, and FU.  Suggest to DC to rehab on current insulin regimen and FU endo 4 weeks. Discussed plan with patient.  Patient counseled for compliance with consistent low carb diet and exercise as tolerated outpatient. Will continue current insulin regimen for now. Will continue monitoring FS, log, and FU.  Suggest to DC to rehab on current insulin regimen and FU endo 4 weeks. Discussed plan with patient.

## 2020-01-30 NOTE — PROGRESS NOTE ADULT - SUBJECTIVE AND OBJECTIVE BOX
Patient is a 59y old  Male who presents with a chief complaint of 59M w/ n/v/d and hyperglycemia sent in from [facility] (30 Jan 2020 13:15)    pt. teto nd examined, no c/o    INTERVAL HPI/OVERNIGHT EVENTS:  T(C): 36.5 (01-31-20 @ 00:19), Max: 36.7 (01-30-20 @ 04:23)  HR: 72 (01-31-20 @ 00:19) (72 - 84)  BP: 127/77 (01-31-20 @ 00:19) (112/73 - 140/77)  RR: 18 (01-31-20 @ 00:19) (18 - 18)  SpO2: 97% (01-31-20 @ 00:19) (97% - 98%)  Wt(kg): --  I&O's Summary    29 Jan 2020 07:01  -  30 Jan 2020 07:00  --------------------------------------------------------  IN: 720 mL / OUT: 800 mL / NET: -80 mL    30 Jan 2020 07:01  -  31 Jan 2020 01:22  --------------------------------------------------------  IN: 300 mL / OUT: 0 mL / NET: 300 mL        PAST MEDICAL & SURGICAL HISTORY:  Oxygen dependent  Gastroesophageal reflux disease, esophagitis presence not specified  Smoker  Bipolar 1 disorder  Coronary artery disease involving native coronary artery of native heart without angina pectoris  Type 2 diabetes mellitus without complication, with long-term current use of insulin  Pulmonary HTN  HLD (hyperlipidemia)  Stented coronary artery  COPD (chronic obstructive pulmonary disease)  No significant past surgical history      SOCIAL HISTORY  Alcohol:  Tobacco:  Illicit substance use:    FAMILY HISTORY:    REVIEW OF SYSTEMS:  CONSTITUTIONAL: No fever, weight loss, or fatigue  EYES: No eye pain, visual disturbances, or discharge  ENMT:  No difficulty hearing, tinnitus, vertigo; No sinus or throat pain  NECK: No pain or stiffness  RESPIRATORY: No cough, wheezing, chills or hemoptysis; No shortness of breath  CARDIOVASCULAR: No chest pain, palpitations, dizziness, or leg swelling  GASTROINTESTINAL: No abdominal or epigastric pain. No nausea, vomiting, or hematemesis; No diarrhea or constipation. No melena or hematochezia.  GENITOURINARY: No dysuria, frequency, hematuria, or incontinence  NEUROLOGICAL: No headaches, memory loss, loss of strength, numbness, or tremors  SKIN: No itching, burning, rashes, or lesions   LYMPH NODES: No enlarged glands  ENDOCRINE: No heat or cold intolerance; No hair loss  MUSCULOSKELETAL: No joint pain or swelling; No muscle, back, or extremity pain  PSYCHIATRIC: No depression, anxiety, mood swings, or difficulty sleeping  HEME/LYMPH: No easy bruising, or bleeding gums  ALLERY AND IMMUNOLOGIC: No hives or eczema    RADIOLOGY & ADDITIONAL TESTS:    Imaging Personally Reviewed:  [ ] YES  [ ] NO    Consultant(s) Notes Reviewed:  [ ] YES  [ ] NO    PHYSICAL EXAM:  GENERAL: NAD, well-groomed, well-developed  HEAD:  Atraumatic, Normocephalic  EYES: EOMI, PERRLA, conjunctiva and sclera clear  ENMT: No tonsillar erythema, exudates, or enlargement; Moist mucous membranes, Good dentition, No lesions  NECK: Supple, No JVD, Normal thyroid  NERVOUS SYSTEM:  Alert & Oriented X3, Good concentration; Motor Strength 5/5 B/L upper and lower extremities; DTRs 2+ intact and symmetric  CHEST/LUNG: Clear to percussion bilaterally; No rales, rhonchi, wheezing, or rubs  HEART: Regular rate and rhythm; No murmurs, rubs, or gallops  ABDOMEN: Soft, Nontender, Nondistended; Bowel sounds present  EXTREMITIES:  2+ Peripheral Pulses, No clubbing, cyanosis, or edema  LYMPH: No lymphadenopathy noted  SKIN: No rashes or lesions    LABS:              CAPILLARY BLOOD GLUCOSE      POCT Blood Glucose.: 167 mg/dL (30 Jan 2020 21:49)  POCT Blood Glucose.: 138 mg/dL (30 Jan 2020 17:19)  POCT Blood Glucose.: 119 mg/dL (30 Jan 2020 12:15)  POCT Blood Glucose.: 136 mg/dL (30 Jan 2020 08:09)            MEDICATIONS  (STANDING):  albuterol/ipratropium for Nebulization 3 milliLiter(s) Nebulizer every 6 hours  aspirin enteric coated 81 milliGRAM(s) Oral daily  atorvastatin 40 milliGRAM(s) Oral at bedtime  budesonide 160 MICROgram(s)/formoterol 4.5 MICROgram(s) Inhaler 2 Puff(s) Inhalation two times a day  dextrose 5%. 1000 milliLiter(s) (50 mL/Hr) IV Continuous <Continuous>  dextrose 50% Injectable 12.5 Gram(s) IV Push once  dextrose 50% Injectable 25 Gram(s) IV Push once  dextrose 50% Injectable 25 Gram(s) IV Push once  enoxaparin Injectable 40 milliGRAM(s) SubCutaneous daily  furosemide    Tablet 20 milliGRAM(s) Oral daily  insulin glargine Injectable (LANTUS) 75 Unit(s) SubCutaneous at bedtime  insulin lispro (HumaLOG) corrective regimen sliding scale   SubCutaneous three times a day before meals  insulin lispro (HumaLOG) corrective regimen sliding scale   SubCutaneous at bedtime  insulin lispro Injectable (HumaLOG) 30 Unit(s) SubCutaneous three times a day with meals  lisinopril 2.5 milliGRAM(s) Oral daily  melatonin 5 milliGRAM(s) Oral at bedtime  nicotine - Inhaler 1 Each Inhalation daily  nystatin Powder 1 Application(s) Topical two times a day  pantoprazole    Tablet 40 milliGRAM(s) Oral daily  polyethylene glycol 3350 17 Gram(s) Oral every 12 hours  risperiDONE   Tablet 0.5 milliGRAM(s) Oral three times a day  senna 2 Tablet(s) Oral at bedtime  sertraline 50 milliGRAM(s) Oral at bedtime    MEDICATIONS  (PRN):  acetaminophen   Tablet .. 650 milliGRAM(s) Oral every 6 hours PRN Mild Pain (1 - 3)  aluminum hydroxide/magnesium hydroxide/simethicone Suspension 30 milliLiter(s) Oral every 4 hours PRN Dyspepsia  bisacodyl 5 milliGRAM(s) Oral every 12 hours PRN Constipation  dextrose 40% Gel 15 Gram(s) Oral once PRN Blood Glucose LESS THAN 70 milliGRAM(s)/deciliter  glucagon  Injectable 1 milliGRAM(s) IntraMuscular once PRN Glucose LESS THAN 70 milligrams/deciliter  oxycodone    5 mG/acetaminophen 325 mG 2 Tablet(s) Oral every 6 hours PRN Moderate Pain (4 - 6)      Care Discussed with Consultants/Other Providers [ ] YES  [ ] NO

## 2020-01-31 LAB
GLUCOSE BLDC GLUCOMTR-MCNC: 111 MG/DL — HIGH (ref 70–99)
GLUCOSE BLDC GLUCOMTR-MCNC: 152 MG/DL — HIGH (ref 70–99)
GLUCOSE BLDC GLUCOMTR-MCNC: 166 MG/DL — HIGH (ref 70–99)
GLUCOSE BLDC GLUCOMTR-MCNC: 177 MG/DL — HIGH (ref 70–99)
GLUCOSE BLDC GLUCOMTR-MCNC: 186 MG/DL — HIGH (ref 70–99)

## 2020-01-31 RX ADMIN — Medication 3 MILLILITER(S): at 18:10

## 2020-01-31 RX ADMIN — BUDESONIDE AND FORMOTEROL FUMARATE DIHYDRATE 2 PUFF(S): 160; 4.5 AEROSOL RESPIRATORY (INHALATION) at 18:11

## 2020-01-31 RX ADMIN — Medication 3 MILLILITER(S): at 06:16

## 2020-01-31 RX ADMIN — NYSTATIN CREAM 1 APPLICATION(S): 100000 CREAM TOPICAL at 06:17

## 2020-01-31 RX ADMIN — ENOXAPARIN SODIUM 40 MILLIGRAM(S): 100 INJECTION SUBCUTANEOUS at 11:43

## 2020-01-31 RX ADMIN — Medication 20 MILLIGRAM(S): at 06:17

## 2020-01-31 RX ADMIN — Medication 3 MILLILITER(S): at 23:56

## 2020-01-31 RX ADMIN — RISPERIDONE 0.5 MILLIGRAM(S): 4 TABLET ORAL at 06:17

## 2020-01-31 RX ADMIN — NYSTATIN CREAM 1 APPLICATION(S): 100000 CREAM TOPICAL at 18:12

## 2020-01-31 RX ADMIN — INSULIN GLARGINE 75 UNIT(S): 100 INJECTION, SOLUTION SUBCUTANEOUS at 22:12

## 2020-01-31 RX ADMIN — Medication 5 MILLIGRAM(S): at 21:19

## 2020-01-31 RX ADMIN — RISPERIDONE 0.5 MILLIGRAM(S): 4 TABLET ORAL at 13:14

## 2020-01-31 RX ADMIN — Medication 1: at 13:13

## 2020-01-31 RX ADMIN — LISINOPRIL 2.5 MILLIGRAM(S): 2.5 TABLET ORAL at 06:17

## 2020-01-31 RX ADMIN — Medication 30 UNIT(S): at 13:13

## 2020-01-31 RX ADMIN — OXYCODONE AND ACETAMINOPHEN 2 TABLET(S): 5; 325 TABLET ORAL at 19:12

## 2020-01-31 RX ADMIN — SERTRALINE 50 MILLIGRAM(S): 25 TABLET, FILM COATED ORAL at 21:19

## 2020-01-31 RX ADMIN — Medication 3 MILLILITER(S): at 01:10

## 2020-01-31 RX ADMIN — OXYCODONE AND ACETAMINOPHEN 2 TABLET(S): 5; 325 TABLET ORAL at 12:56

## 2020-01-31 RX ADMIN — Medication 1: at 08:42

## 2020-01-31 RX ADMIN — Medication 1 EACH: at 11:40

## 2020-01-31 RX ADMIN — Medication 30 UNIT(S): at 18:10

## 2020-01-31 RX ADMIN — BUDESONIDE AND FORMOTEROL FUMARATE DIHYDRATE 2 PUFF(S): 160; 4.5 AEROSOL RESPIRATORY (INHALATION) at 06:23

## 2020-01-31 RX ADMIN — Medication 81 MILLIGRAM(S): at 11:40

## 2020-01-31 RX ADMIN — OXYCODONE AND ACETAMINOPHEN 2 TABLET(S): 5; 325 TABLET ORAL at 06:52

## 2020-01-31 RX ADMIN — OXYCODONE AND ACETAMINOPHEN 2 TABLET(S): 5; 325 TABLET ORAL at 06:22

## 2020-01-31 RX ADMIN — SENNA PLUS 2 TABLET(S): 8.6 TABLET ORAL at 21:19

## 2020-01-31 RX ADMIN — ATORVASTATIN CALCIUM 40 MILLIGRAM(S): 80 TABLET, FILM COATED ORAL at 21:19

## 2020-01-31 RX ADMIN — Medication 30 UNIT(S): at 08:42

## 2020-01-31 RX ADMIN — RISPERIDONE 0.5 MILLIGRAM(S): 4 TABLET ORAL at 21:19

## 2020-01-31 RX ADMIN — PANTOPRAZOLE SODIUM 40 MILLIGRAM(S): 20 TABLET, DELAYED RELEASE ORAL at 11:40

## 2020-01-31 RX ADMIN — Medication 3 MILLILITER(S): at 11:46

## 2020-01-31 RX ADMIN — OXYCODONE AND ACETAMINOPHEN 2 TABLET(S): 5; 325 TABLET ORAL at 12:26

## 2020-01-31 NOTE — PROGRESS NOTE ADULT - SUBJECTIVE AND OBJECTIVE BOX
Patient is a 59y old  Male who presents with a chief complaint of 59M w/ n/v/d and hyperglycemia sent in from [facility] (31 Jan 2020 14:17)      INTERVAL HPI/OVERNIGHT EVENTS:  T(C): 36.4 (01-31-20 @ 19:31), Max: 36.5 (01-31-20 @ 00:19)  HR: 82 (01-31-20 @ 19:31) (65 - 90)  BP: 114/56 (01-31-20 @ 19:31) (90/55 - 127/77)  RR: 18 (01-31-20 @ 19:31) (14 - 18)  SpO2: 96% (01-31-20 @ 19:31) (96% - 99%)  Wt(kg): --  I&O's Summary    30 Jan 2020 07:01  -  31 Jan 2020 07:00  --------------------------------------------------------  IN: 1020 mL / OUT: 360 mL / NET: 660 mL    31 Jan 2020 07:01  -  31 Jan 2020 21:18  --------------------------------------------------------  IN: 730 mL / OUT: 900 mL / NET: -170 mL        PAST MEDICAL & SURGICAL HISTORY:  Oxygen dependent  Gastroesophageal reflux disease, esophagitis presence not specified  Smoker  Bipolar 1 disorder  Coronary artery disease involving native coronary artery of native heart without angina pectoris  Type 2 diabetes mellitus without complication, with long-term current use of insulin  Pulmonary HTN  HLD (hyperlipidemia)  Stented coronary artery  COPD (chronic obstructive pulmonary disease)  No significant past surgical history      SOCIAL HISTORY  Alcohol:  Tobacco:  Illicit substance use:    FAMILY HISTORY:    REVIEW OF SYSTEMS:  CONSTITUTIONAL: No fever, weight loss, or fatigue  EYES: No eye pain, visual disturbances, or discharge  ENMT:  No difficulty hearing, tinnitus, vertigo; No sinus or throat pain  NECK: No pain or stiffness  RESPIRATORY: No cough, wheezing, chills or hemoptysis; No shortness of breath  CARDIOVASCULAR: No chest pain, palpitations, dizziness, or leg swelling  GASTROINTESTINAL: No abdominal or epigastric pain. No nausea, vomiting, or hematemesis; No diarrhea or constipation. No melena or hematochezia.  GENITOURINARY: No dysuria, frequency, hematuria, or incontinence  NEUROLOGICAL: No headaches, memory loss, loss of strength, numbness, or tremors  SKIN: No itching, burning, rashes, or lesions   LYMPH NODES: No enlarged glands  ENDOCRINE: No heat or cold intolerance; No hair loss  MUSCULOSKELETAL: No joint pain or swelling; No muscle, back, or extremity pain  PSYCHIATRIC: No depression, anxiety, mood swings, or difficulty sleeping  HEME/LYMPH: No easy bruising, or bleeding gums  ALLERY AND IMMUNOLOGIC: No hives or eczema    RADIOLOGY & ADDITIONAL TESTS:    Imaging Personally Reviewed:  [ ] YES  [ ] NO    Consultant(s) Notes Reviewed:  [ ] YES  [ ] NO    PHYSICAL EXAM:  GENERAL: NAD, well-groomed, well-developed  HEAD:  Atraumatic, Normocephalic  EYES: EOMI, PERRLA, conjunctiva and sclera clear  ENMT: No tonsillar erythema, exudates, or enlargement; Moist mucous membranes, Good dentition, No lesions  NECK: Supple, No JVD, Normal thyroid  NERVOUS SYSTEM:  Alert & Oriented X3, Good concentration; Motor Strength 5/5 B/L upper and lower extremities; DTRs 2+ intact and symmetric  CHEST/LUNG: Clear to percussion bilaterally; No rales, rhonchi, wheezing, or rubs  HEART: Regular rate and rhythm; No murmurs, rubs, or gallops  ABDOMEN: Soft, Nontender, Nondistended; Bowel sounds present  EXTREMITIES:  2+ Peripheral Pulses, No clubbing, cyanosis, or edema  LYMPH: No lymphadenopathy noted  SKIN: No rashes or lesions    LABS:              CAPILLARY BLOOD GLUCOSE      POCT Blood Glucose.: 111 mg/dL (31 Jan 2020 17:20)  POCT Blood Glucose.: 166 mg/dL (31 Jan 2020 13:06)  POCT Blood Glucose.: 152 mg/dL (31 Jan 2020 12:02)  POCT Blood Glucose.: 177 mg/dL (31 Jan 2020 08:16)  POCT Blood Glucose.: 167 mg/dL (30 Jan 2020 21:49)            MEDICATIONS  (STANDING):  albuterol/ipratropium for Nebulization 3 milliLiter(s) Nebulizer every 6 hours  aspirin enteric coated 81 milliGRAM(s) Oral daily  atorvastatin 40 milliGRAM(s) Oral at bedtime  budesonide 160 MICROgram(s)/formoterol 4.5 MICROgram(s) Inhaler 2 Puff(s) Inhalation two times a day  dextrose 5%. 1000 milliLiter(s) (50 mL/Hr) IV Continuous <Continuous>  dextrose 50% Injectable 12.5 Gram(s) IV Push once  dextrose 50% Injectable 25 Gram(s) IV Push once  dextrose 50% Injectable 25 Gram(s) IV Push once  enoxaparin Injectable 40 milliGRAM(s) SubCutaneous daily  furosemide    Tablet 20 milliGRAM(s) Oral daily  insulin glargine Injectable (LANTUS) 75 Unit(s) SubCutaneous at bedtime  insulin lispro (HumaLOG) corrective regimen sliding scale   SubCutaneous three times a day before meals  insulin lispro (HumaLOG) corrective regimen sliding scale   SubCutaneous at bedtime  insulin lispro Injectable (HumaLOG) 30 Unit(s) SubCutaneous three times a day with meals  lisinopril 2.5 milliGRAM(s) Oral daily  melatonin 5 milliGRAM(s) Oral at bedtime  nicotine - Inhaler 1 Each Inhalation daily  nystatin Powder 1 Application(s) Topical two times a day  pantoprazole    Tablet 40 milliGRAM(s) Oral daily  polyethylene glycol 3350 17 Gram(s) Oral every 12 hours  risperiDONE   Tablet 0.5 milliGRAM(s) Oral three times a day  senna 2 Tablet(s) Oral at bedtime  sertraline 50 milliGRAM(s) Oral at bedtime    MEDICATIONS  (PRN):  acetaminophen   Tablet .. 650 milliGRAM(s) Oral every 6 hours PRN Mild Pain (1 - 3)  aluminum hydroxide/magnesium hydroxide/simethicone Suspension 30 milliLiter(s) Oral every 4 hours PRN Dyspepsia  bisacodyl 5 milliGRAM(s) Oral every 12 hours PRN Constipation  dextrose 40% Gel 15 Gram(s) Oral once PRN Blood Glucose LESS THAN 70 milliGRAM(s)/deciliter  glucagon  Injectable 1 milliGRAM(s) IntraMuscular once PRN Glucose LESS THAN 70 milligrams/deciliter  oxycodone    5 mG/acetaminophen 325 mG 2 Tablet(s) Oral every 6 hours PRN Moderate Pain (4 - 6)      Care Discussed with Consultants/Other Providers [ ] YES  [ ] NO

## 2020-01-31 NOTE — PROGRESS NOTE ADULT - SUBJECTIVE AND OBJECTIVE BOX
Chief complaint  Patient is a 59y old  Male who presents with a chief complaint of 59M w/ n/v/d and hyperglycemia sent [facility] from [facility] (30 Jan 2020 20:22)   Review of systems  Patient in bed, looks comfortable, no hypoglycemia.    Labs and Fingersticks  CAPILLARY BLOOD GLUCOSE      POCT Blood Glucose.: 166 mg/dL (31 Jan 2020 13:06)  POCT Blood Glucose.: 152 mg/dL (31 Jan 2020 12:02)  POCT Blood Glucose.: 177 mg/dL (31 Jan 2020 08:16)  POCT Blood Glucose.: 167 mg/dL (30 Jan 2020 21:49)  POCT Blood Glucose.: 138 mg/dL (30 Jan 2020 17:19)                        Medications  MEDICATIONS  (STANDING):  albuterol/ipratropium for Nebulization 3 milliLiter(s) Nebulizer every 6 hours  aspirin enteric coated 81 milliGRAM(s) Oral daily  atorvastatin 40 milliGRAM(s) Oral at bedtime  budesonide 160 MICROgram(s)/formoterol 4.5 MICROgram(s) Inhaler 2 Puff(s) Inhalation two times a day  dextrose 5%. 1000 milliLiter(s) (50 mL/Hr) IV Continuous <Continuous>  dextrose 50% Injectable 12.5 Gram(s) IV Push once  dextrose 50% Injectable 25 Gram(s) IV Push once  dextrose 50% Injectable 25 Gram(s) IV Push once  enoxaparin Injectable 40 milliGRAM(s) SubCutaneous daily  furosemide    Tablet 20 milliGRAM(s) Oral daily  insulin glargine Injectable (LANTUS) 75 Unit(s) SubCutaneous at bedtime  insulin lispro (HumaLOG) corrective regimen sliding scale   SubCutaneous three times a day before meals  insulin lispro (HumaLOG) corrective regimen sliding scale   SubCutaneous at bedtime  insulin lispro Injectable (HumaLOG) 30 Unit(s) SubCutaneous three times a day with meals  lisinopril 2.5 milliGRAM(s) Oral daily  melatonin 5 milliGRAM(s) Oral at bedtime  nicotine - Inhaler 1 Each Inhalation daily  nystatin Powder 1 Application(s) Topical two times a day  pantoprazole    Tablet 40 milliGRAM(s) Oral daily  polyethylene glycol 3350 17 Gram(s) Oral every 12 hours  risperiDONE   Tablet 0.5 milliGRAM(s) Oral three times a day  senna 2 Tablet(s) Oral at bedtime  sertraline 50 milliGRAM(s) Oral at bedtime      Physical Exam  General: Patient comfortable in bed  Vital Signs Last 12 Hrs  T(F): 97.5 (01-31-20 @ 08:59), Max: 97.5 (01-31-20 @ 04:54)  HR: 83 (01-31-20 @ 12:03) (65 - 90)  BP: 102/72 (01-31-20 @ 12:03) (90/55 - 122/73)  BP(mean): --  RR: 14 (01-31-20 @ 08:59) (14 - 18)  SpO2: 99% (01-31-20 @ 08:59) (97% - 99%)  Neck: No palpable thyroid nodules.  CVS: S1S2, No murmurs  Respiratory: No wheezing, no crepitations  GI: Abdomen soft, bowel sounds positive  Musculoskeletal:  edema lower extremities.   Skin: No skin rashes, no ecchymosis    Diagnostics

## 2020-01-31 NOTE — PROGRESS NOTE ADULT - ASSESSMENT
Assessment  DMT2: 59y Male with DM T2 with hyperglycemia, A1C 10.4%, on basal bolus insulin, blood sugars much improved and trending within acceptable range, no hypoglycemic episodes. Patient is medically cleared for DC, eating full meals with good appetite, resting comfortably, no complaints, no acute events, awaiting rehab placement.  CAD: on medications, no chest pain, stable, monitored.  HTN: Controlled,  on antihypertensive medications.  Overweight/Obesity: No strict exercise routines, not on any weight loss program, neither on low  calorie diet.          Laury Romero MD  Cell: [phone]  Office: [phone] Assessment  DMT2: 59y Male with DM T2 with hyperglycemia, A1C 10.4%, on basal bolus insulin, blood sugars much improved and trending within acceptable range, no hypoglycemic episodes. Patient is medically cleared for DC,  eating full meals with good appetite, resting comfortably, no complaints, no acute events, awaiting rehab placement.  CAD: on medications, no chest pain, stable, monitored.  HTN: Controlled,  on antihypertensive medications.  Overweight/Obesity: No strict exercise routines, not on any weight loss program, neither on low  calorie diet.          Laury Romero MD  Cell: [phone]  Office: [phone]

## 2020-01-31 NOTE — PROGRESS NOTE ADULT - PROBLEM SELECTOR PLAN 1
Will continue current insulin regimen for now. Will continue monitoring FS and FU.  Suggest to DC to rehab on current insulin regimen and FU endo 4 weeks. Discussed plan with patient. Counseled for compliance with consistent low carb diet and exercise as tolerated outpatient. Suggest to DC to rehab on current insulin regimen and FU endo 4 weeks. Discussed plan with patient. Counseled for compliance with consistent low carb diet and exercise as tolerated outpatient.

## 2020-02-01 LAB
GLUCOSE BLDC GLUCOMTR-MCNC: 176 MG/DL — HIGH (ref 70–99)
GLUCOSE BLDC GLUCOMTR-MCNC: 199 MG/DL — HIGH (ref 70–99)
GLUCOSE BLDC GLUCOMTR-MCNC: 204 MG/DL — HIGH (ref 70–99)
GLUCOSE BLDC GLUCOMTR-MCNC: 246 MG/DL — HIGH (ref 70–99)

## 2020-02-01 RX ADMIN — NYSTATIN CREAM 1 APPLICATION(S): 100000 CREAM TOPICAL at 17:45

## 2020-02-01 RX ADMIN — BUDESONIDE AND FORMOTEROL FUMARATE DIHYDRATE 2 PUFF(S): 160; 4.5 AEROSOL RESPIRATORY (INHALATION) at 17:45

## 2020-02-01 RX ADMIN — Medication 5 MILLIGRAM(S): at 21:05

## 2020-02-01 RX ADMIN — PANTOPRAZOLE SODIUM 40 MILLIGRAM(S): 20 TABLET, DELAYED RELEASE ORAL at 12:24

## 2020-02-01 RX ADMIN — Medication 1: at 17:43

## 2020-02-01 RX ADMIN — Medication 30 UNIT(S): at 08:38

## 2020-02-01 RX ADMIN — Medication 3 MILLILITER(S): at 12:21

## 2020-02-01 RX ADMIN — Medication 30 UNIT(S): at 12:25

## 2020-02-01 RX ADMIN — Medication 2: at 12:25

## 2020-02-01 RX ADMIN — OXYCODONE AND ACETAMINOPHEN 2 TABLET(S): 5; 325 TABLET ORAL at 06:02

## 2020-02-01 RX ADMIN — LISINOPRIL 2.5 MILLIGRAM(S): 2.5 TABLET ORAL at 05:59

## 2020-02-01 RX ADMIN — RISPERIDONE 0.5 MILLIGRAM(S): 4 TABLET ORAL at 14:30

## 2020-02-01 RX ADMIN — RISPERIDONE 0.5 MILLIGRAM(S): 4 TABLET ORAL at 05:59

## 2020-02-01 RX ADMIN — NYSTATIN CREAM 1 APPLICATION(S): 100000 CREAM TOPICAL at 05:59

## 2020-02-01 RX ADMIN — Medication 1: at 08:38

## 2020-02-01 RX ADMIN — INSULIN GLARGINE 75 UNIT(S): 100 INJECTION, SOLUTION SUBCUTANEOUS at 21:33

## 2020-02-01 RX ADMIN — OXYCODONE AND ACETAMINOPHEN 2 TABLET(S): 5; 325 TABLET ORAL at 06:28

## 2020-02-01 RX ADMIN — RISPERIDONE 0.5 MILLIGRAM(S): 4 TABLET ORAL at 21:05

## 2020-02-01 RX ADMIN — OXYCODONE AND ACETAMINOPHEN 2 TABLET(S): 5; 325 TABLET ORAL at 18:54

## 2020-02-01 RX ADMIN — Medication 81 MILLIGRAM(S): at 12:21

## 2020-02-01 RX ADMIN — Medication 3 MILLILITER(S): at 17:44

## 2020-02-01 RX ADMIN — Medication 30 UNIT(S): at 17:43

## 2020-02-01 RX ADMIN — Medication 3 MILLILITER(S): at 05:58

## 2020-02-01 RX ADMIN — BUDESONIDE AND FORMOTEROL FUMARATE DIHYDRATE 2 PUFF(S): 160; 4.5 AEROSOL RESPIRATORY (INHALATION) at 05:59

## 2020-02-01 RX ADMIN — SENNA PLUS 2 TABLET(S): 8.6 TABLET ORAL at 21:05

## 2020-02-01 RX ADMIN — ATORVASTATIN CALCIUM 40 MILLIGRAM(S): 80 TABLET, FILM COATED ORAL at 21:05

## 2020-02-01 RX ADMIN — Medication 20 MILLIGRAM(S): at 05:59

## 2020-02-01 RX ADMIN — OXYCODONE AND ACETAMINOPHEN 2 TABLET(S): 5; 325 TABLET ORAL at 18:24

## 2020-02-01 RX ADMIN — ENOXAPARIN SODIUM 40 MILLIGRAM(S): 100 INJECTION SUBCUTANEOUS at 12:21

## 2020-02-01 RX ADMIN — SERTRALINE 50 MILLIGRAM(S): 25 TABLET, FILM COATED ORAL at 21:05

## 2020-02-01 RX ADMIN — OXYCODONE AND ACETAMINOPHEN 2 TABLET(S): 5; 325 TABLET ORAL at 12:50

## 2020-02-01 RX ADMIN — OXYCODONE AND ACETAMINOPHEN 2 TABLET(S): 5; 325 TABLET ORAL at 12:20

## 2020-02-01 NOTE — PROGRESS NOTE ADULT - PROBLEM SELECTOR PLAN 1
Will continue current insulin regimen for now. Will continue monitoring FS, log, will Follow up.  Suggest to DC to rehab on current insulin regimen and FU endo 4 weeks. Discussed plan with patient. Counseled for compliance with consistent low carb diet and exercise as tolerated outpatient.

## 2020-02-01 NOTE — PROGRESS NOTE ADULT - ASSESSMENT
Assessment  DMT2: 59y Male with DM T2 with hyperglycemia, A1C 10.4%, on basal bolus insulin, blood sugars improving, no hypoglycemic episode, eating full meals with good appetite, resting comfortably, no complaints, no acute events, awaiting rehab placement.  CAD: on medications, no chest pain, stable, monitored.  HTN: Controlled,  on antihypertensive medications.  Overweight/Obesity: No strict exercise routines, not on any weight loss program, neither on low  calorie diet.          Laury Romero MD  Cell: [phone]  Office: [phone]

## 2020-02-01 NOTE — PROGRESS NOTE ADULT - SUBJECTIVE AND OBJECTIVE BOX
Chief complaint  Patient is a 59y old  Male who presents with a chief complaint of 59M w/ n/v/d and hyperglycemia sent in from [facility] (31 Jan 2020 21:18)   Review of systems  Patient in bed, looks comfortable, no fever, no hypoglycemia.    Labs and Fingersticks  CAPILLARY BLOOD GLUCOSE      POCT Blood Glucose.: 246 mg/dL (01 Feb 2020 12:09)  POCT Blood Glucose.: 176 mg/dL (01 Feb 2020 08:08)  POCT Blood Glucose.: 186 mg/dL (31 Jan 2020 21:40)  POCT Blood Glucose.: 111 mg/dL (31 Jan 2020 17:20)                        Medications  MEDICATIONS  (STANDING):  albuterol/ipratropium for Nebulization 3 milliLiter(s) Nebulizer every 6 hours  aspirin enteric coated 81 milliGRAM(s) Oral daily  atorvastatin 40 milliGRAM(s) Oral at bedtime  budesonide 160 MICROgram(s)/formoterol 4.5 MICROgram(s) Inhaler 2 Puff(s) Inhalation two times a day  dextrose 5%. 1000 milliLiter(s) (50 mL/Hr) IV Continuous <Continuous>  dextrose 50% Injectable 12.5 Gram(s) IV Push once  dextrose 50% Injectable 25 Gram(s) IV Push once  dextrose 50% Injectable 25 Gram(s) IV Push once  enoxaparin Injectable 40 milliGRAM(s) SubCutaneous daily  furosemide    Tablet 20 milliGRAM(s) Oral daily  insulin glargine Injectable (LANTUS) 75 Unit(s) SubCutaneous at bedtime  insulin lispro (HumaLOG) corrective regimen sliding scale   SubCutaneous three times a day before meals  insulin lispro (HumaLOG) corrective regimen sliding scale   SubCutaneous at bedtime  insulin lispro Injectable (HumaLOG) 30 Unit(s) SubCutaneous three times a day with meals  lisinopril 2.5 milliGRAM(s) Oral daily  melatonin 5 milliGRAM(s) Oral at bedtime  nicotine - Inhaler 1 Each Inhalation daily  nystatin Powder 1 Application(s) Topical two times a day  pantoprazole    Tablet 40 milliGRAM(s) Oral daily  polyethylene glycol 3350 17 Gram(s) Oral every 12 hours  risperiDONE   Tablet 0.5 milliGRAM(s) Oral three times a day  senna 2 Tablet(s) Oral at bedtime  sertraline 50 milliGRAM(s) Oral at bedtime      Physical Exam  General: Patient comfortable in bed  Vital Signs Last 12 Hrs  T(F): 97.3 (02-01-20 @ 12:25), Max: 98.4 (02-01-20 @ 05:55)  HR: 79 (02-01-20 @ 12:25) (75 - 79)  BP: 103/70 (02-01-20 @ 12:25) (103/67 - 103/70)  BP(mean): --  RR: 18 (02-01-20 @ 12:25) (17 - 18)  SpO2: 96% (02-01-20 @ 12:25) (96% - 100%)  Neck: No palpable thyroid nodules.  CVS: S1S2, No murmurs  Respiratory: No wheezing, no crepitations  GI: Abdomen soft, bowel sounds positive  Musculoskeletal:  edema lower extremities.   Skin: No skin rashes, no ecchymosis    Diagnostics

## 2020-02-01 NOTE — PROGRESS NOTE ADULT - SUBJECTIVE AND OBJECTIVE BOX
Patient is a 59y old  Male who presents with a chief complaint of 59M w/ n/v/d and hyperglycemia sent in from [facility] (01 Feb 2020 15:27)      INTERVAL HPI/OVERNIGHT EVENTS:  T(C): 36.7 (02-01-20 @ 20:39), Max: 36.9 (02-01-20 @ 05:55)  HR: 94 (02-01-20 @ 20:39) (74 - 94)  BP: 111/73 (02-01-20 @ 20:39) (103/67 - 111/73)  RR: 18 (02-01-20 @ 20:39) (16 - 18)  SpO2: 94% (02-01-20 @ 20:39) (94% - 100%)  Wt(kg): --  I&O's Summary    31 Jan 2020 07:01  -  01 Feb 2020 07:00  --------------------------------------------------------  IN: 1210 mL / OUT: 2400 mL / NET: -1190 mL    01 Feb 2020 07:01  -  01 Feb 2020 23:28  --------------------------------------------------------  IN: 480 mL / OUT: 800 mL / NET: -320 mL        PAST MEDICAL & SURGICAL HISTORY:  Oxygen dependent  Gastroesophageal reflux disease, esophagitis presence not specified  Smoker  Bipolar 1 disorder  Coronary artery disease involving native coronary artery of native heart without angina pectoris  Type 2 diabetes mellitus without complication, with long-term current use of insulin  Pulmonary HTN  HLD (hyperlipidemia)  Stented coronary artery  COPD (chronic obstructive pulmonary disease)  No significant past surgical history      SOCIAL HISTORY  Alcohol:  Tobacco:  Illicit substance use:    FAMILY HISTORY:    REVIEW OF SYSTEMS:  CONSTITUTIONAL: No fever, weight loss, or fatigue  EYES: No eye pain, visual disturbances, or discharge  ENMT:  No difficulty hearing, tinnitus, vertigo; No sinus or throat pain  NECK: No pain or stiffness  RESPIRATORY: No cough, wheezing, chills or hemoptysis; No shortness of breath  CARDIOVASCULAR: No chest pain, palpitations, dizziness, or leg swelling  GASTROINTESTINAL: No abdominal or epigastric pain. No nausea, vomiting, or hematemesis; No diarrhea or constipation. No melena or hematochezia.  GENITOURINARY: No dysuria, frequency, hematuria, or incontinence  NEUROLOGICAL: No headaches, memory loss, loss of strength, numbness, or tremors  SKIN: No itching, burning, rashes, or lesions   LYMPH NODES: No enlarged glands  ENDOCRINE: No heat or cold intolerance; No hair loss  MUSCULOSKELETAL: No joint pain or swelling; No muscle, back, or extremity pain  PSYCHIATRIC: No depression, anxiety, mood swings, or difficulty sleeping  HEME/LYMPH: No easy bruising, or bleeding gums  ALLERY AND IMMUNOLOGIC: No hives or eczema    RADIOLOGY & ADDITIONAL TESTS:    Imaging Personally Reviewed:  [ ] YES  [ ] NO    Consultant(s) Notes Reviewed:  [ ] YES  [ ] NO    PHYSICAL EXAM:  GENERAL: NAD, well-groomed, well-developed  HEAD:  Atraumatic, Normocephalic  EYES: EOMI, PERRLA, conjunctiva and sclera clear  ENMT: No tonsillar erythema, exudates, or enlargement; Moist mucous membranes, Good dentition, No lesions  NECK: Supple, No JVD, Normal thyroid  NERVOUS SYSTEM:  Alert & Oriented X3, Good concentration; Motor Strength 5/5 B/L upper and lower extremities; DTRs 2+ intact and symmetric  CHEST/LUNG: Clear to percussion bilaterally; No rales, rhonchi, wheezing, or rubs  HEART: Regular rate and rhythm; No murmurs, rubs, or gallops  ABDOMEN: Soft, Nontender, Nondistended; Bowel sounds present  EXTREMITIES:  2+ Peripheral Pulses, No clubbing, cyanosis, or edema  LYMPH: No lymphadenopathy noted  SKIN: No rashes or lesions    LABS:              CAPILLARY BLOOD GLUCOSE      POCT Blood Glucose.: 204 mg/dL (01 Feb 2020 21:19)  POCT Blood Glucose.: 199 mg/dL (01 Feb 2020 17:18)  POCT Blood Glucose.: 246 mg/dL (01 Feb 2020 12:09)  POCT Blood Glucose.: 176 mg/dL (01 Feb 2020 08:08)            MEDICATIONS  (STANDING):  albuterol/ipratropium for Nebulization 3 milliLiter(s) Nebulizer every 6 hours  aspirin enteric coated 81 milliGRAM(s) Oral daily  atorvastatin 40 milliGRAM(s) Oral at bedtime  budesonide 160 MICROgram(s)/formoterol 4.5 MICROgram(s) Inhaler 2 Puff(s) Inhalation two times a day  dextrose 5%. 1000 milliLiter(s) (50 mL/Hr) IV Continuous <Continuous>  dextrose 50% Injectable 12.5 Gram(s) IV Push once  dextrose 50% Injectable 25 Gram(s) IV Push once  dextrose 50% Injectable 25 Gram(s) IV Push once  enoxaparin Injectable 40 milliGRAM(s) SubCutaneous daily  furosemide    Tablet 20 milliGRAM(s) Oral daily  insulin glargine Injectable (LANTUS) 75 Unit(s) SubCutaneous at bedtime  insulin lispro (HumaLOG) corrective regimen sliding scale   SubCutaneous three times a day before meals  insulin lispro (HumaLOG) corrective regimen sliding scale   SubCutaneous at bedtime  insulin lispro Injectable (HumaLOG) 30 Unit(s) SubCutaneous three times a day with meals  lisinopril 2.5 milliGRAM(s) Oral daily  melatonin 5 milliGRAM(s) Oral at bedtime  nicotine - Inhaler 1 Each Inhalation daily  nystatin Powder 1 Application(s) Topical two times a day  pantoprazole    Tablet 40 milliGRAM(s) Oral daily  polyethylene glycol 3350 17 Gram(s) Oral every 12 hours  risperiDONE   Tablet 0.5 milliGRAM(s) Oral three times a day  senna 2 Tablet(s) Oral at bedtime  sertraline 50 milliGRAM(s) Oral at bedtime    MEDICATIONS  (PRN):  acetaminophen   Tablet .. 650 milliGRAM(s) Oral every 6 hours PRN Mild Pain (1 - 3)  aluminum hydroxide/magnesium hydroxide/simethicone Suspension 30 milliLiter(s) Oral every 4 hours PRN Dyspepsia  bisacodyl 5 milliGRAM(s) Oral every 12 hours PRN Constipation  dextrose 40% Gel 15 Gram(s) Oral once PRN Blood Glucose LESS THAN 70 milliGRAM(s)/deciliter  glucagon  Injectable 1 milliGRAM(s) IntraMuscular once PRN Glucose LESS THAN 70 milligrams/deciliter  oxycodone    5 mG/acetaminophen 325 mG 2 Tablet(s) Oral every 6 hours PRN Moderate Pain (4 - 6)      Care Discussed with Consultants/Other Providers [ ] YES  [ ] NO

## 2020-02-02 LAB
GLUCOSE BLDC GLUCOMTR-MCNC: 187 MG/DL — HIGH (ref 70–99)
GLUCOSE BLDC GLUCOMTR-MCNC: 203 MG/DL — HIGH (ref 70–99)
GLUCOSE BLDC GLUCOMTR-MCNC: 213 MG/DL — HIGH (ref 70–99)
GLUCOSE BLDC GLUCOMTR-MCNC: 243 MG/DL — HIGH (ref 70–99)

## 2020-02-02 RX ADMIN — Medication 1 EACH: at 12:17

## 2020-02-02 RX ADMIN — Medication 2: at 08:05

## 2020-02-02 RX ADMIN — RISPERIDONE 0.5 MILLIGRAM(S): 4 TABLET ORAL at 05:40

## 2020-02-02 RX ADMIN — NYSTATIN CREAM 1 APPLICATION(S): 100000 CREAM TOPICAL at 17:25

## 2020-02-02 RX ADMIN — Medication 30 UNIT(S): at 12:18

## 2020-02-02 RX ADMIN — Medication 650 MILLIGRAM(S): at 23:52

## 2020-02-02 RX ADMIN — Medication 2: at 17:26

## 2020-02-02 RX ADMIN — Medication 1: at 12:18

## 2020-02-02 RX ADMIN — ATORVASTATIN CALCIUM 40 MILLIGRAM(S): 80 TABLET, FILM COATED ORAL at 22:20

## 2020-02-02 RX ADMIN — PANTOPRAZOLE SODIUM 40 MILLIGRAM(S): 20 TABLET, DELAYED RELEASE ORAL at 12:17

## 2020-02-02 RX ADMIN — Medication 3 MILLILITER(S): at 23:52

## 2020-02-02 RX ADMIN — Medication 30 UNIT(S): at 08:05

## 2020-02-02 RX ADMIN — Medication 3 MILLILITER(S): at 17:26

## 2020-02-02 RX ADMIN — NYSTATIN CREAM 1 APPLICATION(S): 100000 CREAM TOPICAL at 05:42

## 2020-02-02 RX ADMIN — SENNA PLUS 2 TABLET(S): 8.6 TABLET ORAL at 22:20

## 2020-02-02 RX ADMIN — Medication 3 MILLILITER(S): at 12:17

## 2020-02-02 RX ADMIN — SERTRALINE 50 MILLIGRAM(S): 25 TABLET, FILM COATED ORAL at 22:20

## 2020-02-02 RX ADMIN — Medication 3 MILLILITER(S): at 00:20

## 2020-02-02 RX ADMIN — RISPERIDONE 0.5 MILLIGRAM(S): 4 TABLET ORAL at 22:20

## 2020-02-02 RX ADMIN — LISINOPRIL 2.5 MILLIGRAM(S): 2.5 TABLET ORAL at 05:40

## 2020-02-02 RX ADMIN — Medication 81 MILLIGRAM(S): at 12:17

## 2020-02-02 RX ADMIN — Medication 3 MILLILITER(S): at 05:40

## 2020-02-02 RX ADMIN — OXYCODONE AND ACETAMINOPHEN 2 TABLET(S): 5; 325 TABLET ORAL at 12:20

## 2020-02-02 RX ADMIN — OXYCODONE AND ACETAMINOPHEN 2 TABLET(S): 5; 325 TABLET ORAL at 13:20

## 2020-02-02 RX ADMIN — OXYCODONE AND ACETAMINOPHEN 2 TABLET(S): 5; 325 TABLET ORAL at 06:30

## 2020-02-02 RX ADMIN — RISPERIDONE 0.5 MILLIGRAM(S): 4 TABLET ORAL at 13:40

## 2020-02-02 RX ADMIN — OXYCODONE AND ACETAMINOPHEN 2 TABLET(S): 5; 325 TABLET ORAL at 18:24

## 2020-02-02 RX ADMIN — OXYCODONE AND ACETAMINOPHEN 2 TABLET(S): 5; 325 TABLET ORAL at 05:41

## 2020-02-02 RX ADMIN — Medication 20 MILLIGRAM(S): at 05:40

## 2020-02-02 RX ADMIN — INSULIN GLARGINE 75 UNIT(S): 100 INJECTION, SOLUTION SUBCUTANEOUS at 22:19

## 2020-02-02 RX ADMIN — Medication 5 MILLIGRAM(S): at 23:52

## 2020-02-02 RX ADMIN — BUDESONIDE AND FORMOTEROL FUMARATE DIHYDRATE 2 PUFF(S): 160; 4.5 AEROSOL RESPIRATORY (INHALATION) at 17:28

## 2020-02-02 RX ADMIN — OXYCODONE AND ACETAMINOPHEN 2 TABLET(S): 5; 325 TABLET ORAL at 19:09

## 2020-02-02 RX ADMIN — Medication 30 UNIT(S): at 17:26

## 2020-02-02 RX ADMIN — BUDESONIDE AND FORMOTEROL FUMARATE DIHYDRATE 2 PUFF(S): 160; 4.5 AEROSOL RESPIRATORY (INHALATION) at 05:41

## 2020-02-02 RX ADMIN — Medication 650 MILLIGRAM(S): at 22:20

## 2020-02-02 RX ADMIN — ENOXAPARIN SODIUM 40 MILLIGRAM(S): 100 INJECTION SUBCUTANEOUS at 12:17

## 2020-02-02 NOTE — PROGRESS NOTE ADULT - SUBJECTIVE AND OBJECTIVE BOX
Chief complaint  Patient is a 59y old  Male who presents with a chief complaint of 59M w/ n/v/d and hyperglycemia sent in from [facility] (01 Feb 2020 23:28)   Review of systems  Patient in bed, looks comfortable, no hypoglycemia.    Labs and Fingersticks  CAPILLARY BLOOD GLUCOSE      POCT Blood Glucose.: 187 mg/dL (02 Feb 2020 12:03)  POCT Blood Glucose.: 213 mg/dL (02 Feb 2020 07:52)  POCT Blood Glucose.: 204 mg/dL (01 Feb 2020 21:19)  POCT Blood Glucose.: 199 mg/dL (01 Feb 2020 17:18)                        Medications  MEDICATIONS  (STANDING):  albuterol/ipratropium for Nebulization 3 milliLiter(s) Nebulizer every 6 hours  aspirin enteric coated 81 milliGRAM(s) Oral daily  atorvastatin 40 milliGRAM(s) Oral at bedtime  budesonide 160 MICROgram(s)/formoterol 4.5 MICROgram(s) Inhaler 2 Puff(s) Inhalation two times a day  dextrose 5%. 1000 milliLiter(s) (50 mL/Hr) IV Continuous <Continuous>  dextrose 50% Injectable 12.5 Gram(s) IV Push once  dextrose 50% Injectable 25 Gram(s) IV Push once  dextrose 50% Injectable 25 Gram(s) IV Push once  enoxaparin Injectable 40 milliGRAM(s) SubCutaneous daily  furosemide    Tablet 20 milliGRAM(s) Oral daily  insulin glargine Injectable (LANTUS) 75 Unit(s) SubCutaneous at bedtime  insulin lispro (HumaLOG) corrective regimen sliding scale   SubCutaneous three times a day before meals  insulin lispro (HumaLOG) corrective regimen sliding scale   SubCutaneous at bedtime  insulin lispro Injectable (HumaLOG) 30 Unit(s) SubCutaneous three times a day with meals  lisinopril 2.5 milliGRAM(s) Oral daily  melatonin 5 milliGRAM(s) Oral at bedtime  nicotine - Inhaler 1 Each Inhalation daily  nystatin Powder 1 Application(s) Topical two times a day  pantoprazole    Tablet 40 milliGRAM(s) Oral daily  polyethylene glycol 3350 17 Gram(s) Oral every 12 hours  risperiDONE   Tablet 0.5 milliGRAM(s) Oral three times a day  senna 2 Tablet(s) Oral at bedtime  sertraline 50 milliGRAM(s) Oral at bedtime      Physical Exam  General: Patient comfortable in bed  Vital Signs Last 12 Hrs  T(F): 98.8 (02-02-20 @ 13:41), Max: 98.8 (02-02-20 @ 13:41)  HR: 91 (02-02-20 @ 13:41) (79 - 91)  BP: 104/70 (02-02-20 @ 13:41) (104/70 - 119/83)  BP(mean): --  RR: 18 (02-02-20 @ 13:41) (18 - 18)  SpO2: 96% (02-02-20 @ 13:41) (96% - 96%)  Neck: No palpable thyroid nodules.  CVS: S1S2, No murmurs  Respiratory: No wheezing, no crepitations  GI: Abdomen soft, bowel sounds positive  Musculoskeletal:  edema lower extremities.   Skin: No skin rashes, no ecchymosis    Diagnostics Chief complaint  Patient is a 59y old  Male who presents with a chief complaint of 59M w/ n/v/d and hyperglycemia  sent in from [facility] (01 Feb 2020 23:28)   Review of systems  Patient in bed, looks comfortable, no hypoglycemia.    Labs and Fingersticks  CAPILLARY BLOOD GLUCOSE      POCT Blood Glucose.: 187 mg/dL (02 Feb 2020 12:03)  POCT Blood Glucose.: 213 mg/dL (02 Feb 2020 07:52)  POCT Blood Glucose.: 204 mg/dL (01 Feb 2020 21:19)  POCT Blood Glucose.: 199 mg/dL (01 Feb 2020 17:18)                        Medications  MEDICATIONS  (STANDING):  albuterol/ipratropium for Nebulization 3 milliLiter(s) Nebulizer every 6 hours  aspirin enteric coated 81 milliGRAM(s) Oral daily  atorvastatin 40 milliGRAM(s) Oral at bedtime  budesonide 160 MICROgram(s)/formoterol 4.5 MICROgram(s) Inhaler 2 Puff(s) Inhalation two times a day  dextrose 5%. 1000 milliLiter(s) (50 mL/Hr) IV Continuous <Continuous>  dextrose 50% Injectable 12.5 Gram(s) IV Push once  dextrose 50% Injectable 25 Gram(s) IV Push once  dextrose 50% Injectable 25 Gram(s) IV Push once  enoxaparin Injectable 40 milliGRAM(s) SubCutaneous daily  furosemide    Tablet 20 milliGRAM(s) Oral daily  insulin glargine Injectable (LANTUS) 75 Unit(s) SubCutaneous at bedtime  insulin lispro (HumaLOG) corrective regimen sliding scale   SubCutaneous three times a day before meals  insulin lispro (HumaLOG) corrective regimen sliding scale   SubCutaneous at bedtime  insulin lispro Injectable (HumaLOG) 30 Unit(s) SubCutaneous three times a day with meals  lisinopril 2.5 milliGRAM(s) Oral daily  melatonin 5 milliGRAM(s) Oral at bedtime  nicotine - Inhaler 1 Each Inhalation daily  nystatin Powder 1 Application(s) Topical two times a day  pantoprazole    Tablet 40 milliGRAM(s) Oral daily  polyethylene glycol 3350 17 Gram(s) Oral every 12 hours  risperiDONE   Tablet 0.5 milliGRAM(s) Oral three times a day  senna 2 Tablet(s) Oral at bedtime  sertraline 50 milliGRAM(s) Oral at bedtime      Physical Exam  General: Patient comfortable in bed  Vital Signs Last 12 Hrs  T(F): 98.8 (02-02-20 @ 13:41), Max: 98.8 (02-02-20 @ 13:41)  HR: 91 (02-02-20 @ 13:41) (79 - 91)  BP: 104/70 (02-02-20 @ 13:41) (104/70 - 119/83)  BP(mean): --  RR: 18 (02-02-20 @ 13:41) (18 - 18)  SpO2: 96% (02-02-20 @ 13:41) (96% - 96%)  Neck: No palpable thyroid nodules.  CVS: S1S2, No murmurs  Respiratory: No wheezing, no crepitations  GI: Abdomen soft, bowel sounds positive  Musculoskeletal:  edema lower extremities.   Skin: No skin rashes, no ecchymosis    Diagnostics

## 2020-02-02 NOTE — PROGRESS NOTE ADULT - PROBLEM SELECTOR PLAN 1
Will continue current insulin regimen for now. Will continue monitoring FS, log, will Follow up.  Suggest to DC to rehab on current insulin regimen and FU endo 4 weeks. Discussed plan with patient. Counseled for compliance with consistent low carb diet and exercise as tolerated outpatient. Will continue current insulin regimen for now. Will continue monitoring FS, log, will Follow up.

## 2020-02-02 NOTE — PROGRESS NOTE ADULT - SUBJECTIVE AND OBJECTIVE BOX
Patient is a 59y old  Male who presents with a chief complaint of 59M w/ n/v/d and hyperglycemia sent in from [facility] (02 Feb 2020 14:55)    pt. teto nd examined , no c/o  INTERVAL HPI/OVERNIGHT EVENTS:  T(C): 36.6 (02-02-20 @ 20:09), Max: 37.1 (02-02-20 @ 13:41)  HR: 90 (02-02-20 @ 20:09) (79 - 91)  BP: 132/78 (02-02-20 @ 20:09) (104/70 - 132/78)  RR: 18 (02-02-20 @ 20:09) (18 - 18)  SpO2: 97% (02-02-20 @ 20:09) (96% - 97%)  Wt(kg): --  I&O's Summary    01 Feb 2020 07:01  -  02 Feb 2020 07:00  --------------------------------------------------------  IN: 1200 mL / OUT: 800 mL / NET: 400 mL    02 Feb 2020 07:01  -  03 Feb 2020 00:04  --------------------------------------------------------  IN: 960 mL / OUT: 1200 mL / NET: -240 mL        PAST MEDICAL & SURGICAL HISTORY:  Oxygen dependent  Gastroesophageal reflux disease, esophagitis presence not specified  Smoker  Bipolar 1 disorder  Coronary artery disease involving native coronary artery of native heart without angina pectoris  Type 2 diabetes mellitus without complication, with long-term current use of insulin  Pulmonary HTN  HLD (hyperlipidemia)  Stented coronary artery  COPD (chronic obstructive pulmonary disease)  No significant past surgical history      SOCIAL HISTORY  Alcohol:  Tobacco:  Illicit substance use:    FAMILY HISTORY:    REVIEW OF SYSTEMS:  CONSTITUTIONAL: No fever, weight loss, or fatigue  EYES: No eye pain, visual disturbances, or discharge  ENMT:  No difficulty hearing, tinnitus, vertigo; No sinus or throat pain  NECK: No pain or stiffness  RESPIRATORY: No cough, wheezing, chills or hemoptysis; No shortness of breath  CARDIOVASCULAR: No chest pain, palpitations, dizziness, or leg swelling  GASTROINTESTINAL: No abdominal or epigastric pain. No nausea, vomiting, or hematemesis; No diarrhea or constipation. No melena or hematochezia.  GENITOURINARY: No dysuria, frequency, hematuria, or incontinence  NEUROLOGICAL: No headaches, memory loss, loss of strength, numbness, or tremors  SKIN: No itching, burning, rashes, or lesions   LYMPH NODES: No enlarged glands  ENDOCRINE: No heat or cold intolerance; No hair loss  MUSCULOSKELETAL: No joint pain or swelling; No muscle, back, or extremity pain  PSYCHIATRIC: No depression, anxiety, mood swings, or difficulty sleeping  HEME/LYMPH: No easy bruising, or bleeding gums  ALLERY AND IMMUNOLOGIC: No hives or eczema    RADIOLOGY & ADDITIONAL TESTS:    Imaging Personally Reviewed:  [ ] YES  [ ] NO    Consultant(s) Notes Reviewed:  [ ] YES  [ ] NO    PHYSICAL EXAM:  GENERAL: NAD, well-groomed, well-developed  HEAD:  Atraumatic, Normocephalic  EYES: EOMI, PERRLA, conjunctiva and sclera clear  ENMT: No tonsillar erythema, exudates, or enlargement; Moist mucous membranes, Good dentition, No lesions  NECK: Supple, No JVD, Normal thyroid  NERVOUS SYSTEM:  Alert & Oriented X3, Good concentration; Motor Strength 5/5 B/L upper and lower extremities; DTRs 2+ intact and symmetric  CHEST/LUNG: Clear to percussion bilaterally; No rales, rhonchi, wheezing, or rubs  HEART: Regular rate and rhythm; No murmurs, rubs, or gallops  ABDOMEN: Soft, Nontender, Nondistended; Bowel sounds present  EXTREMITIES:  2+ Peripheral Pulses, No clubbing, cyanosis, or edema  LYMPH: No lymphadenopathy noted  SKIN: No rashes or lesions    LABS:              CAPILLARY BLOOD GLUCOSE      POCT Blood Glucose.: 203 mg/dL (02 Feb 2020 22:09)  POCT Blood Glucose.: 243 mg/dL (02 Feb 2020 17:01)  POCT Blood Glucose.: 187 mg/dL (02 Feb 2020 12:03)  POCT Blood Glucose.: 213 mg/dL (02 Feb 2020 07:52)            MEDICATIONS  (STANDING):  albuterol/ipratropium for Nebulization 3 milliLiter(s) Nebulizer every 6 hours  aspirin enteric coated 81 milliGRAM(s) Oral daily  atorvastatin 40 milliGRAM(s) Oral at bedtime  budesonide 160 MICROgram(s)/formoterol 4.5 MICROgram(s) Inhaler 2 Puff(s) Inhalation two times a day  dextrose 5%. 1000 milliLiter(s) (50 mL/Hr) IV Continuous <Continuous>  dextrose 50% Injectable 12.5 Gram(s) IV Push once  dextrose 50% Injectable 25 Gram(s) IV Push once  dextrose 50% Injectable 25 Gram(s) IV Push once  enoxaparin Injectable 40 milliGRAM(s) SubCutaneous daily  furosemide    Tablet 20 milliGRAM(s) Oral daily  insulin glargine Injectable (LANTUS) 75 Unit(s) SubCutaneous at bedtime  insulin lispro (HumaLOG) corrective regimen sliding scale   SubCutaneous three times a day before meals  insulin lispro (HumaLOG) corrective regimen sliding scale   SubCutaneous at bedtime  insulin lispro Injectable (HumaLOG) 30 Unit(s) SubCutaneous three times a day with meals  lisinopril 2.5 milliGRAM(s) Oral daily  melatonin 5 milliGRAM(s) Oral at bedtime  nicotine - Inhaler 1 Each Inhalation daily  nystatin Powder 1 Application(s) Topical two times a day  pantoprazole    Tablet 40 milliGRAM(s) Oral daily  polyethylene glycol 3350 17 Gram(s) Oral every 12 hours  risperiDONE   Tablet 0.5 milliGRAM(s) Oral three times a day  senna 2 Tablet(s) Oral at bedtime  sertraline 50 milliGRAM(s) Oral at bedtime    MEDICATIONS  (PRN):  acetaminophen   Tablet .. 650 milliGRAM(s) Oral every 6 hours PRN Mild Pain (1 - 3)  aluminum hydroxide/magnesium hydroxide/simethicone Suspension 30 milliLiter(s) Oral every 4 hours PRN Dyspepsia  bisacodyl 5 milliGRAM(s) Oral every 12 hours PRN Constipation  dextrose 40% Gel 15 Gram(s) Oral once PRN Blood Glucose LESS THAN 70 milliGRAM(s)/deciliter  glucagon  Injectable 1 milliGRAM(s) IntraMuscular once PRN Glucose LESS THAN 70 milligrams/deciliter  oxycodone    5 mG/acetaminophen 325 mG 2 Tablet(s) Oral every 6 hours PRN Moderate Pain (4 - 6)      Care Discussed with Consultants/Other Providers [ ] YES  [ ] NO

## 2020-02-02 NOTE — PROGRESS NOTE ADULT - ASSESSMENT
Assessment  DMT2: 59y Male with DM T2 with hyperglycemia, A1C 10.4%, on basal bolus insulin, blood sugars improving, FS within overall acceptable range with some fluctuations, no hypoglycemic episodes. Patient is eating full meals with good appetite, resting comfortably, awaiting rehab placement.  CAD: on medications, no chest pain, stable, monitored.  HTN: Controlled,  on antihypertensive medications.  Overweight/Obesity: No strict exercise routines, not on any weight loss program, neither on low  calorie diet.          Laury Romero MD  Cell: [phone]  Office: [phone] Assessment  DMT2: 59y Male with DM T2 with hyperglycemia, A1C 10.4%, on basal bolus insulin,  blood sugars improving, FS within overall acceptable range with some fluctuations, no hypoglycemic episodes. Patient is eating full meals with good appetite, resting comfortably, awaiting rehab placement.  CAD: on medications, no chest pain, stable, monitored.  HTN: Controlled,  on antihypertensive medications.  Overweight/Obesity: No strict exercise routines, not on any weight loss program, neither on low  calorie diet.          Laury Romero MD  Cell: [phone]  Office: [phone]

## 2020-02-03 LAB
GLUCOSE BLDC GLUCOMTR-MCNC: 164 MG/DL — HIGH (ref 70–99)
GLUCOSE BLDC GLUCOMTR-MCNC: 184 MG/DL — HIGH (ref 70–99)
GLUCOSE BLDC GLUCOMTR-MCNC: 205 MG/DL — HIGH (ref 70–99)
GLUCOSE BLDC GLUCOMTR-MCNC: 226 MG/DL — HIGH (ref 70–99)

## 2020-02-03 RX ORDER — OXYCODONE AND ACETAMINOPHEN 5; 325 MG/1; MG/1
2 TABLET ORAL EVERY 6 HOURS
Refills: 0 | Status: DISCONTINUED | OUTPATIENT
Start: 2020-02-04 | End: 2020-02-11

## 2020-02-03 RX ADMIN — INSULIN GLARGINE 75 UNIT(S): 100 INJECTION, SOLUTION SUBCUTANEOUS at 22:11

## 2020-02-03 RX ADMIN — RISPERIDONE 0.5 MILLIGRAM(S): 4 TABLET ORAL at 14:27

## 2020-02-03 RX ADMIN — SENNA PLUS 2 TABLET(S): 8.6 TABLET ORAL at 22:11

## 2020-02-03 RX ADMIN — OXYCODONE AND ACETAMINOPHEN 2 TABLET(S): 5; 325 TABLET ORAL at 09:10

## 2020-02-03 RX ADMIN — LISINOPRIL 2.5 MILLIGRAM(S): 2.5 TABLET ORAL at 05:22

## 2020-02-03 RX ADMIN — BUDESONIDE AND FORMOTEROL FUMARATE DIHYDRATE 2 PUFF(S): 160; 4.5 AEROSOL RESPIRATORY (INHALATION) at 18:00

## 2020-02-03 RX ADMIN — Medication 30 UNIT(S): at 17:58

## 2020-02-03 RX ADMIN — Medication 3 MILLILITER(S): at 22:12

## 2020-02-03 RX ADMIN — OXYCODONE AND ACETAMINOPHEN 2 TABLET(S): 5; 325 TABLET ORAL at 22:17

## 2020-02-03 RX ADMIN — Medication 20 MILLIGRAM(S): at 05:22

## 2020-02-03 RX ADMIN — NYSTATIN CREAM 1 APPLICATION(S): 100000 CREAM TOPICAL at 17:59

## 2020-02-03 RX ADMIN — Medication 3 MILLILITER(S): at 17:59

## 2020-02-03 RX ADMIN — ATORVASTATIN CALCIUM 40 MILLIGRAM(S): 80 TABLET, FILM COATED ORAL at 22:11

## 2020-02-03 RX ADMIN — RISPERIDONE 0.5 MILLIGRAM(S): 4 TABLET ORAL at 05:22

## 2020-02-03 RX ADMIN — Medication 5 MILLIGRAM(S): at 22:11

## 2020-02-03 RX ADMIN — NYSTATIN CREAM 1 APPLICATION(S): 100000 CREAM TOPICAL at 05:22

## 2020-02-03 RX ADMIN — OXYCODONE AND ACETAMINOPHEN 2 TABLET(S): 5; 325 TABLET ORAL at 08:40

## 2020-02-03 RX ADMIN — Medication 30 UNIT(S): at 12:58

## 2020-02-03 RX ADMIN — Medication 81 MILLIGRAM(S): at 12:59

## 2020-02-03 RX ADMIN — BUDESONIDE AND FORMOTEROL FUMARATE DIHYDRATE 2 PUFF(S): 160; 4.5 AEROSOL RESPIRATORY (INHALATION) at 05:22

## 2020-02-03 RX ADMIN — Medication 1: at 17:59

## 2020-02-03 RX ADMIN — OXYCODONE AND ACETAMINOPHEN 2 TABLET(S): 5; 325 TABLET ORAL at 22:47

## 2020-02-03 RX ADMIN — OXYCODONE AND ACETAMINOPHEN 2 TABLET(S): 5; 325 TABLET ORAL at 16:29

## 2020-02-03 RX ADMIN — PANTOPRAZOLE SODIUM 40 MILLIGRAM(S): 20 TABLET, DELAYED RELEASE ORAL at 13:00

## 2020-02-03 RX ADMIN — RISPERIDONE 0.5 MILLIGRAM(S): 4 TABLET ORAL at 22:12

## 2020-02-03 RX ADMIN — Medication 3 MILLILITER(S): at 05:22

## 2020-02-03 RX ADMIN — SERTRALINE 50 MILLIGRAM(S): 25 TABLET, FILM COATED ORAL at 22:12

## 2020-02-03 RX ADMIN — Medication 650 MILLIGRAM(S): at 18:25

## 2020-02-03 RX ADMIN — ENOXAPARIN SODIUM 40 MILLIGRAM(S): 100 INJECTION SUBCUTANEOUS at 12:58

## 2020-02-03 RX ADMIN — Medication 30 UNIT(S): at 08:41

## 2020-02-03 RX ADMIN — Medication 2: at 08:41

## 2020-02-03 RX ADMIN — Medication 1: at 12:58

## 2020-02-03 RX ADMIN — Medication 3 MILLILITER(S): at 12:59

## 2020-02-03 RX ADMIN — Medication 650 MILLIGRAM(S): at 18:55

## 2020-02-03 RX ADMIN — OXYCODONE AND ACETAMINOPHEN 2 TABLET(S): 5; 325 TABLET ORAL at 15:59

## 2020-02-03 NOTE — PROGRESS NOTE ADULT - PROBLEM SELECTOR PLAN 1
Will continue current insulin regimen for now. Will continue monitoring FS, log, and FU.  Suggest to DC to rehab on current insulin regimen. FU endo 4 weeks.  Patient counseled for compliance with consistent low carb diet and exercise as tolerated outpatient. Will continue current insulin regimen for now. Will continue monitoring FS, log, and FU.

## 2020-02-03 NOTE — PROGRESS NOTE ADULT - SUBJECTIVE AND OBJECTIVE BOX
Chief complaint  Patient is a 59y old  Male who presents with a chief complaint of 59M w/ n/v/d and hyperglycemia sent in from [facility] (02 Feb 2020 20:04)   Review of systems  Patient in bed, looks comfortable, no hypoglycemia.    Labs and Fingersticks  CAPILLARY BLOOD GLUCOSE      POCT Blood Glucose.: 184 mg/dL (03 Feb 2020 12:27)  POCT Blood Glucose.: 205 mg/dL (03 Feb 2020 08:10)  POCT Blood Glucose.: 203 mg/dL (02 Feb 2020 22:09)  POCT Blood Glucose.: 243 mg/dL (02 Feb 2020 17:01)                        Medications  MEDICATIONS  (STANDING):  albuterol/ipratropium for Nebulization 3 milliLiter(s) Nebulizer every 6 hours  aspirin enteric coated 81 milliGRAM(s) Oral daily  atorvastatin 40 milliGRAM(s) Oral at bedtime  budesonide 160 MICROgram(s)/formoterol 4.5 MICROgram(s) Inhaler 2 Puff(s) Inhalation two times a day  dextrose 5%. 1000 milliLiter(s) (50 mL/Hr) IV Continuous <Continuous>  dextrose 50% Injectable 12.5 Gram(s) IV Push once  dextrose 50% Injectable 25 Gram(s) IV Push once  dextrose 50% Injectable 25 Gram(s) IV Push once  enoxaparin Injectable 40 milliGRAM(s) SubCutaneous daily  furosemide    Tablet 20 milliGRAM(s) Oral daily  insulin glargine Injectable (LANTUS) 75 Unit(s) SubCutaneous at bedtime  insulin lispro (HumaLOG) corrective regimen sliding scale   SubCutaneous three times a day before meals  insulin lispro (HumaLOG) corrective regimen sliding scale   SubCutaneous at bedtime  insulin lispro Injectable (HumaLOG) 30 Unit(s) SubCutaneous three times a day with meals  lisinopril 2.5 milliGRAM(s) Oral daily  melatonin 5 milliGRAM(s) Oral at bedtime  nicotine - Inhaler 1 Each Inhalation daily  nystatin Powder 1 Application(s) Topical two times a day  pantoprazole    Tablet 40 milliGRAM(s) Oral daily  polyethylene glycol 3350 17 Gram(s) Oral every 12 hours  risperiDONE   Tablet 0.5 milliGRAM(s) Oral three times a day  senna 2 Tablet(s) Oral at bedtime  sertraline 50 milliGRAM(s) Oral at bedtime      Physical Exam  General: Patient comfortable in bed  Vital Signs Last 12 Hrs  T(F): 97.6 (02-03-20 @ 13:58), Max: 98.1 (02-03-20 @ 05:19)  HR: 88 (02-03-20 @ 13:58) (84 - 88)  BP: 114/65 (02-03-20 @ 13:58) (114/65 - 128/79)  BP(mean): --  RR: 18 (02-03-20 @ 13:58) (17 - 18)  SpO2: 95% (02-03-20 @ 13:58) (95% - 98%)  Neck: No palpable thyroid nodules.  CVS: S1S2, No murmurs  Respiratory: No wheezing, no crepitations  GI: Abdomen soft, bowel sounds positive  Musculoskeletal:  edema lower extremities.   Skin: No skin rashes, no ecchymosis    Diagnostics Chief complaint  Patient is a 59y old  Male who presents with a chief complaint of 59M w/ n/v/d and hyperglycemia  sent in from [facility] (02 Feb 2020 20:04)   Review of systems  Patient in bed, looks comfortable, no hypoglycemia.    Labs and Fingersticks  CAPILLARY BLOOD GLUCOSE      POCT Blood Glucose.: 184 mg/dL (03 Feb 2020 12:27)  POCT Blood Glucose.: 205 mg/dL (03 Feb 2020 08:10)  POCT Blood Glucose.: 203 mg/dL (02 Feb 2020 22:09)  POCT Blood Glucose.: 243 mg/dL (02 Feb 2020 17:01)                        Medications  MEDICATIONS  (STANDING):  albuterol/ipratropium for Nebulization 3 milliLiter(s) Nebulizer every 6 hours  aspirin enteric coated 81 milliGRAM(s) Oral daily  atorvastatin 40 milliGRAM(s) Oral at bedtime  budesonide 160 MICROgram(s)/formoterol 4.5 MICROgram(s) Inhaler 2 Puff(s) Inhalation two times a day  dextrose 5%. 1000 milliLiter(s) (50 mL/Hr) IV Continuous <Continuous>  dextrose 50% Injectable 12.5 Gram(s) IV Push once  dextrose 50% Injectable 25 Gram(s) IV Push once  dextrose 50% Injectable 25 Gram(s) IV Push once  enoxaparin Injectable 40 milliGRAM(s) SubCutaneous daily  furosemide    Tablet 20 milliGRAM(s) Oral daily  insulin glargine Injectable (LANTUS) 75 Unit(s) SubCutaneous at bedtime  insulin lispro (HumaLOG) corrective regimen sliding scale   SubCutaneous three times a day before meals  insulin lispro (HumaLOG) corrective regimen sliding scale   SubCutaneous at bedtime  insulin lispro Injectable (HumaLOG) 30 Unit(s) SubCutaneous three times a day with meals  lisinopril 2.5 milliGRAM(s) Oral daily  melatonin 5 milliGRAM(s) Oral at bedtime  nicotine - Inhaler 1 Each Inhalation daily  nystatin Powder 1 Application(s) Topical two times a day  pantoprazole    Tablet 40 milliGRAM(s) Oral daily  polyethylene glycol 3350 17 Gram(s) Oral every 12 hours  risperiDONE   Tablet 0.5 milliGRAM(s) Oral three times a day  senna 2 Tablet(s) Oral at bedtime  sertraline 50 milliGRAM(s) Oral at bedtime      Physical Exam  General: Patient comfortable in bed  Vital Signs Last 12 Hrs  T(F): 97.6 (02-03-20 @ 13:58), Max: 98.1 (02-03-20 @ 05:19)  HR: 88 (02-03-20 @ 13:58) (84 - 88)  BP: 114/65 (02-03-20 @ 13:58) (114/65 - 128/79)  BP(mean): --  RR: 18 (02-03-20 @ 13:58) (17 - 18)  SpO2: 95% (02-03-20 @ 13:58) (95% - 98%)  Neck: No palpable thyroid nodules.  CVS: S1S2, No murmurs  Respiratory: No wheezing, no crepitations  GI: Abdomen soft, bowel sounds positive  Musculoskeletal:  edema lower extremities.   Skin: No skin rashes, no ecchymosis    Diagnostics

## 2020-02-03 NOTE — PROGRESS NOTE ADULT - SUBJECTIVE AND OBJECTIVE BOX
Patient is a 59y old  Male who presents with a chief complaint of 59M w/ n/v/d and hyperglycemia sent in from [facility] (03 Feb 2020 14:43)      INTERVAL HPI/OVERNIGHT EVENTS:  T(C): 37.1 (02-03-20 @ 20:21), Max: 37.1 (02-03-20 @ 20:21)  HR: 80 (02-03-20 @ 20:21) (80 - 88)  BP: 110/72 (02-03-20 @ 20:21) (102/65 - 128/79)  RR: 18 (02-03-20 @ 20:21) (17 - 18)  SpO2: 96% (02-03-20 @ 20:21) (95% - 98%)  Wt(kg): --  I&O's Summary    02 Feb 2020 07:01  -  03 Feb 2020 07:00  --------------------------------------------------------  IN: 960 mL / OUT: 1650 mL / NET: -690 mL    03 Feb 2020 07:01  -  03 Feb 2020 23:07  --------------------------------------------------------  IN: 2037 mL / OUT: 3020 mL / NET: -983 mL        PAST MEDICAL & SURGICAL HISTORY:  Oxygen dependent  Gastroesophageal reflux disease, esophagitis presence not specified  Smoker  Bipolar 1 disorder  Coronary artery disease involving native coronary artery of native heart without angina pectoris  Type 2 diabetes mellitus without complication, with long-term current use of insulin  Pulmonary HTN  HLD (hyperlipidemia)  Stented coronary artery  COPD (chronic obstructive pulmonary disease)  No significant past surgical history      SOCIAL HISTORY  Alcohol:  Tobacco:  Illicit substance use:    FAMILY HISTORY:    REVIEW OF SYSTEMS:  CONSTITUTIONAL: No fever, weight loss, or fatigue  EYES: No eye pain, visual disturbances, or discharge  ENMT:  No difficulty hearing, tinnitus, vertigo; No sinus or throat pain  NECK: No pain or stiffness  RESPIRATORY: No cough, wheezing, chills or hemoptysis; No shortness of breath  CARDIOVASCULAR: No chest pain, palpitations, dizziness, or leg swelling  GASTROINTESTINAL: No abdominal or epigastric pain. No nausea, vomiting, or hematemesis; No diarrhea or constipation. No melena or hematochezia.  GENITOURINARY: No dysuria, frequency, hematuria, or incontinence  NEUROLOGICAL: No headaches, memory loss, loss of strength, numbness, or tremors  SKIN: No itching, burning, rashes, or lesions   LYMPH NODES: No enlarged glands  ENDOCRINE: No heat or cold intolerance; No hair loss  MUSCULOSKELETAL: No joint pain or swelling; No muscle, back, or extremity pain  PSYCHIATRIC: No depression, anxiety, mood swings, or difficulty sleeping  HEME/LYMPH: No easy bruising, or bleeding gums  ALLERY AND IMMUNOLOGIC: No hives or eczema    RADIOLOGY & ADDITIONAL TESTS:    Imaging Personally Reviewed:  [ ] YES  [ ] NO    Consultant(s) Notes Reviewed:  [ ] YES  [ ] NO    PHYSICAL EXAM:  GENERAL: NAD, well-groomed, well-developed  HEAD:  Atraumatic, Normocephalic  EYES: EOMI, PERRLA, conjunctiva and sclera clear  ENMT: No tonsillar erythema, exudates, or enlargement; Moist mucous membranes, Good dentition, No lesions  NECK: Supple, No JVD, Normal thyroid  NERVOUS SYSTEM:  Alert & Oriented X3, Good concentration; Motor Strength 5/5 B/L upper and lower extremities; DTRs 2+ intact and symmetric  CHEST/LUNG: Clear to percussion bilaterally; No rales, rhonchi, wheezing, or rubs  HEART: Regular rate and rhythm; No murmurs, rubs, or gallops  ABDOMEN: Soft, Nontender, Nondistended; Bowel sounds present  EXTREMITIES:  2+ Peripheral Pulses, No clubbing, cyanosis, or edema  LYMPH: No lymphadenopathy noted  SKIN: No rashes or lesions    LABS:              CAPILLARY BLOOD GLUCOSE      POCT Blood Glucose.: 226 mg/dL (03 Feb 2020 21:50)  POCT Blood Glucose.: 164 mg/dL (03 Feb 2020 17:04)  POCT Blood Glucose.: 184 mg/dL (03 Feb 2020 12:27)  POCT Blood Glucose.: 205 mg/dL (03 Feb 2020 08:10)            MEDICATIONS  (STANDING):  albuterol/ipratropium for Nebulization 3 milliLiter(s) Nebulizer every 6 hours  aspirin enteric coated 81 milliGRAM(s) Oral daily  atorvastatin 40 milliGRAM(s) Oral at bedtime  budesonide 160 MICROgram(s)/formoterol 4.5 MICROgram(s) Inhaler 2 Puff(s) Inhalation two times a day  dextrose 5%. 1000 milliLiter(s) (50 mL/Hr) IV Continuous <Continuous>  dextrose 50% Injectable 12.5 Gram(s) IV Push once  dextrose 50% Injectable 25 Gram(s) IV Push once  dextrose 50% Injectable 25 Gram(s) IV Push once  enoxaparin Injectable 40 milliGRAM(s) SubCutaneous daily  furosemide    Tablet 20 milliGRAM(s) Oral daily  insulin glargine Injectable (LANTUS) 75 Unit(s) SubCutaneous at bedtime  insulin lispro (HumaLOG) corrective regimen sliding scale   SubCutaneous three times a day before meals  insulin lispro (HumaLOG) corrective regimen sliding scale   SubCutaneous at bedtime  insulin lispro Injectable (HumaLOG) 30 Unit(s) SubCutaneous three times a day with meals  lisinopril 2.5 milliGRAM(s) Oral daily  melatonin 5 milliGRAM(s) Oral at bedtime  nicotine - Inhaler 1 Each Inhalation daily  nystatin Powder 1 Application(s) Topical two times a day  pantoprazole    Tablet 40 milliGRAM(s) Oral daily  polyethylene glycol 3350 17 Gram(s) Oral every 12 hours  risperiDONE   Tablet 0.5 milliGRAM(s) Oral three times a day  senna 2 Tablet(s) Oral at bedtime  sertraline 50 milliGRAM(s) Oral at bedtime    MEDICATIONS  (PRN):  acetaminophen   Tablet .. 650 milliGRAM(s) Oral every 6 hours PRN Mild Pain (1 - 3)  aluminum hydroxide/magnesium hydroxide/simethicone Suspension 30 milliLiter(s) Oral every 4 hours PRN Dyspepsia  bisacodyl 5 milliGRAM(s) Oral every 12 hours PRN Constipation  dextrose 40% Gel 15 Gram(s) Oral once PRN Blood Glucose LESS THAN 70 milliGRAM(s)/deciliter  glucagon  Injectable 1 milliGRAM(s) IntraMuscular once PRN Glucose LESS THAN 70 milligrams/deciliter      Care Discussed with Consultants/Other Providers [ ] YES  [ ] NO

## 2020-02-03 NOTE — PROGRESS NOTE ADULT - ASSESSMENT
Assessment  DMT2: 59y Male with DM T2 with hyperglycemia, A1C 10.4%, on basal bolus insulin, blood sugars fluctuating, FS in overall acceptable range with some elevations, no hypoglycemic episodes. Patient is eating full meals with good appetite, resting comfortably, awaiting rehab placement. No acute events.  CAD: on medications, no chest pain, stable, monitored.  HTN: Controlled,  on antihypertensive medications.  Overweight/Obesity: No strict exercise routines, not on any weight loss program, neither on low  calorie diet.          Laury Romero MD  Cell: [phone]  Office: [phone] Assessment  DMT2: 59y Male with DM T2 with hyperglycemia, A1C 10.4%, on basal bolus insulin, blood sugars fluctuating, FS in overall acceptable range with some elevations,  no hypoglycemic episodes. Patient is eating full meals with good appetite, resting comfortably, awaiting rehab placement. No acute events.  CAD: on medications, no chest pain, stable, monitored.  HTN: Controlled,  on antihypertensive medications.  Overweight/Obesity: No strict exercise routines, not on any weight loss program, neither on low  calorie diet.          Laury Romero MD  Cell: [phone]  Office: [phone]

## 2020-02-04 DIAGNOSIS — A41.9 SEPSIS, UNSPECIFIED ORGANISM: ICD-10-CM

## 2020-02-04 LAB
GLUCOSE BLDC GLUCOMTR-MCNC: 144 MG/DL — HIGH (ref 70–99)
GLUCOSE BLDC GLUCOMTR-MCNC: 153 MG/DL — HIGH (ref 70–99)
GLUCOSE BLDC GLUCOMTR-MCNC: 155 MG/DL — HIGH (ref 70–99)
GLUCOSE BLDC GLUCOMTR-MCNC: 189 MG/DL — HIGH (ref 70–99)

## 2020-02-04 RX ADMIN — Medication 650 MILLIGRAM(S): at 21:50

## 2020-02-04 RX ADMIN — Medication 5 MILLIGRAM(S): at 21:50

## 2020-02-04 RX ADMIN — RISPERIDONE 0.5 MILLIGRAM(S): 4 TABLET ORAL at 05:24

## 2020-02-04 RX ADMIN — Medication 1 EACH: at 12:04

## 2020-02-04 RX ADMIN — NYSTATIN CREAM 1 APPLICATION(S): 100000 CREAM TOPICAL at 17:25

## 2020-02-04 RX ADMIN — Medication 1: at 17:26

## 2020-02-04 RX ADMIN — Medication 650 MILLIGRAM(S): at 22:39

## 2020-02-04 RX ADMIN — Medication 81 MILLIGRAM(S): at 12:04

## 2020-02-04 RX ADMIN — BUDESONIDE AND FORMOTEROL FUMARATE DIHYDRATE 2 PUFF(S): 160; 4.5 AEROSOL RESPIRATORY (INHALATION) at 05:25

## 2020-02-04 RX ADMIN — RISPERIDONE 0.5 MILLIGRAM(S): 4 TABLET ORAL at 13:00

## 2020-02-04 RX ADMIN — PANTOPRAZOLE SODIUM 40 MILLIGRAM(S): 20 TABLET, DELAYED RELEASE ORAL at 12:04

## 2020-02-04 RX ADMIN — OXYCODONE AND ACETAMINOPHEN 2 TABLET(S): 5; 325 TABLET ORAL at 13:02

## 2020-02-04 RX ADMIN — ENOXAPARIN SODIUM 40 MILLIGRAM(S): 100 INJECTION SUBCUTANEOUS at 12:04

## 2020-02-04 RX ADMIN — Medication 30 UNIT(S): at 08:36

## 2020-02-04 RX ADMIN — Medication 3 MILLILITER(S): at 12:04

## 2020-02-04 RX ADMIN — Medication 30 UNIT(S): at 12:25

## 2020-02-04 RX ADMIN — OXYCODONE AND ACETAMINOPHEN 2 TABLET(S): 5; 325 TABLET ORAL at 05:56

## 2020-02-04 RX ADMIN — RISPERIDONE 0.5 MILLIGRAM(S): 4 TABLET ORAL at 21:49

## 2020-02-04 RX ADMIN — OXYCODONE AND ACETAMINOPHEN 2 TABLET(S): 5; 325 TABLET ORAL at 12:03

## 2020-02-04 RX ADMIN — LISINOPRIL 2.5 MILLIGRAM(S): 2.5 TABLET ORAL at 05:24

## 2020-02-04 RX ADMIN — Medication 650 MILLIGRAM(S): at 15:44

## 2020-02-04 RX ADMIN — NYSTATIN CREAM 1 APPLICATION(S): 100000 CREAM TOPICAL at 05:24

## 2020-02-04 RX ADMIN — BUDESONIDE AND FORMOTEROL FUMARATE DIHYDRATE 2 PUFF(S): 160; 4.5 AEROSOL RESPIRATORY (INHALATION) at 17:25

## 2020-02-04 RX ADMIN — OXYCODONE AND ACETAMINOPHEN 2 TABLET(S): 5; 325 TABLET ORAL at 18:09

## 2020-02-04 RX ADMIN — OXYCODONE AND ACETAMINOPHEN 2 TABLET(S): 5; 325 TABLET ORAL at 06:48

## 2020-02-04 RX ADMIN — Medication 3 MILLILITER(S): at 05:24

## 2020-02-04 RX ADMIN — Medication 1: at 12:25

## 2020-02-04 RX ADMIN — INSULIN GLARGINE 75 UNIT(S): 100 INJECTION, SOLUTION SUBCUTANEOUS at 22:39

## 2020-02-04 RX ADMIN — SENNA PLUS 2 TABLET(S): 8.6 TABLET ORAL at 21:50

## 2020-02-04 RX ADMIN — Medication 3 MILLILITER(S): at 17:25

## 2020-02-04 RX ADMIN — ATORVASTATIN CALCIUM 40 MILLIGRAM(S): 80 TABLET, FILM COATED ORAL at 21:49

## 2020-02-04 RX ADMIN — Medication 20 MILLIGRAM(S): at 05:24

## 2020-02-04 RX ADMIN — SERTRALINE 50 MILLIGRAM(S): 25 TABLET, FILM COATED ORAL at 21:50

## 2020-02-04 RX ADMIN — Medication 650 MILLIGRAM(S): at 16:45

## 2020-02-04 RX ADMIN — Medication 30 UNIT(S): at 17:26

## 2020-02-04 RX ADMIN — OXYCODONE AND ACETAMINOPHEN 2 TABLET(S): 5; 325 TABLET ORAL at 18:53

## 2020-02-04 NOTE — PROGRESS NOTE ADULT - ASSESSMENT
Assessment  DMT2: 59y Male with DM T2 with hyperglycemia, A1C 10.4%, on basal bolus insulin, blood sugars improving, FS in overall acceptable range with some elevations, no hypoglycemic episodes. Patient is eating full meals with good appetite, alert and comfortable, still awaiting rehab placement.  CAD: on medications, no chest pain, stable, monitored.  HTN: Controlled,  on antihypertensive medications.  Overweight/Obesity: No strict exercise routines, not on any weight loss program, neither on low  calorie diet.          Laury Romero MD  Cell: [phone]  Office: [phone] Assessment  DMT2: 59y Male with DM T2 with hyperglycemia, A1C 10.4%, on basal bolus insulin, blood sugars improving, FS in overall acceptable range with some elevations, no hypoglycemic episodes. Patient is eating full meals with good appetite,  alert and comfortable, still awaiting rehab placement.  CAD: on medications, no chest pain, stable, monitored.  HTN: Controlled,  on antihypertensive medications.  Overweight/Obesity: No strict exercise routines, not on any weight loss program, neither on low  calorie diet.          Laury Romero MD  Cell: [phone]  Office: [phone]

## 2020-02-04 NOTE — PROGRESS NOTE ADULT - PROBLEM SELECTOR PLAN 1
Will continue current insulin regimen for now. Will continue monitoring FS, log, and FU.  Suggest to DC to rehab on current insulin regimen and FU endo 4 weeks. Discussed plan with patient. Counseled for compliance with consistent low carb diet and exercise as tolerated outpatient. Will continue current insulin regimen for now. Will continue monitoring FS, log, and FU.

## 2020-02-04 NOTE — PROGRESS NOTE ADULT - SUBJECTIVE AND OBJECTIVE BOX
Patient is a 59y old  Male who presents with a chief complaint of 59M w/ n/v/d and hyperglycemia sent in from  (04 Feb 2020 11:52)    pt. seen and examined, no c/o    INTERVAL HPI/OVERNIGHT EVENTS:  T(C): 36.4 (02-04-20 @ 12:12), Max: 37.1 (02-03-20 @ 20:21)  HR: 82 (02-04-20 @ 12:12) (76 - 82)  BP: 100/64 (02-04-20 @ 12:12) (100/64 - 117/72)  RR: 18 (02-04-20 @ 12:12) (18 - 19)  SpO2: 96% (02-04-20 @ 12:12) (96% - 97%)  Wt(kg): --  I&O's Summary    03 Feb 2020 07:01  -  04 Feb 2020 07:00  --------------------------------------------------------  IN: 2037 mL / OUT: 3620 mL / NET: -1583 mL    04 Feb 2020 07:01  -  04 Feb 2020 19:14  --------------------------------------------------------  IN: 830 mL / OUT: 1600 mL / NET: -770 mL        PAST MEDICAL & SURGICAL HISTORY:  Oxygen dependent  Gastroesophageal reflux disease, esophagitis presence not specified  Smoker  Bipolar 1 disorder  Coronary artery disease involving native coronary artery of native heart without angina pectoris  Type 2 diabetes mellitus without complication, with long-term current use of insulin  Pulmonary HTN  HLD (hyperlipidemia)  Stented coronary artery  COPD (chronic obstructive pulmonary disease)  No significant past surgical history      SOCIAL HISTORY  Alcohol:  Tobacco:  Illicit substance use:    FAMILY HISTORY:    REVIEW OF SYSTEMS:  CONSTITUTIONAL: No fever, weight loss, or fatigue  EYES: No eye pain, visual disturbances, or discharge  ENMT:  No difficulty hearing, tinnitus, vertigo; No sinus or throat pain  NECK: No pain or stiffness  RESPIRATORY: No cough, wheezing, chills or hemoptysis; No shortness of breath  CARDIOVASCULAR: No chest pain, palpitations, dizziness, or leg swelling  GASTROINTESTINAL: No abdominal or epigastric pain. No nausea, vomiting, or hematemesis; No diarrhea or constipation. No melena or hematochezia.  GENITOURINARY: No dysuria, frequency, hematuria, or incontinence  NEUROLOGICAL: No headaches, memory loss, loss of strength, numbness, or tremors  SKIN: No itching, burning, rashes, or lesions   LYMPH NODES: No enlarged glands  ENDOCRINE: No heat or cold intolerance; No hair loss  MUSCULOSKELETAL: No joint pain or swelling; No muscle, back, or extremity pain  PSYCHIATRIC: No depression, anxiety, mood swings, or difficulty sleeping  HEME/LYMPH: No easy bruising, or bleeding gums  ALLERY AND IMMUNOLOGIC: No hives or eczema    RADIOLOGY & ADDITIONAL TESTS:    Imaging Personally Reviewed:  [ ] YES  [ ] NO    Consultant(s) Notes Reviewed:  [ ] YES  [ ] NO    PHYSICAL EXAM:  GENERAL: NAD, well-groomed, well-developed  HEAD:  Atraumatic, Normocephalic  EYES: EOMI, PERRLA, conjunctiva and sclera clear  ENMT: No tonsillar erythema, exudates, or enlargement; Moist mucous membranes, Good dentition, No lesions  NECK: Supple, No JVD, Normal thyroid  NERVOUS SYSTEM:  Alert & Oriented X3, Good concentration; Motor Strength 5/5 B/L upper and lower extremities; DTRs 2+ intact and symmetric  CHEST/LUNG: Clear to percussion bilaterally; No rales, rhonchi, wheezing, or rubs  HEART: Regular rate and rhythm; No murmurs, rubs, or gallops  ABDOMEN: Soft, Nontender, Nondistended; Bowel sounds present  EXTREMITIES:  2+ Peripheral Pulses, No clubbing, cyanosis, or edema  LYMPH: No lymphadenopathy noted  SKIN: No rashes or lesions    LABS:              CAPILLARY BLOOD GLUCOSE      POCT Blood Glucose.: 153 mg/dL (04 Feb 2020 17:21)  POCT Blood Glucose.: 155 mg/dL (04 Feb 2020 12:02)  POCT Blood Glucose.: 144 mg/dL (04 Feb 2020 08:27)  POCT Blood Glucose.: 226 mg/dL (03 Feb 2020 21:50)            MEDICATIONS  (STANDING):  albuterol/ipratropium for Nebulization 3 milliLiter(s) Nebulizer every 6 hours  aspirin enteric coated 81 milliGRAM(s) Oral daily  atorvastatin 40 milliGRAM(s) Oral at bedtime  budesonide 160 MICROgram(s)/formoterol 4.5 MICROgram(s) Inhaler 2 Puff(s) Inhalation two times a day  dextrose 5%. 1000 milliLiter(s) (50 mL/Hr) IV Continuous <Continuous>  dextrose 50% Injectable 12.5 Gram(s) IV Push once  dextrose 50% Injectable 25 Gram(s) IV Push once  dextrose 50% Injectable 25 Gram(s) IV Push once  enoxaparin Injectable 40 milliGRAM(s) SubCutaneous daily  furosemide    Tablet 20 milliGRAM(s) Oral daily  insulin glargine Injectable (LANTUS) 75 Unit(s) SubCutaneous at bedtime  insulin lispro (HumaLOG) corrective regimen sliding scale   SubCutaneous three times a day before meals  insulin lispro (HumaLOG) corrective regimen sliding scale   SubCutaneous at bedtime  insulin lispro Injectable (HumaLOG) 30 Unit(s) SubCutaneous three times a day with meals  lisinopril 2.5 milliGRAM(s) Oral daily  melatonin 5 milliGRAM(s) Oral at bedtime  nicotine - Inhaler 1 Each Inhalation daily  nystatin Powder 1 Application(s) Topical two times a day  pantoprazole    Tablet 40 milliGRAM(s) Oral daily  polyethylene glycol 3350 17 Gram(s) Oral every 12 hours  risperiDONE   Tablet 0.5 milliGRAM(s) Oral three times a day  senna 2 Tablet(s) Oral at bedtime  sertraline 50 milliGRAM(s) Oral at bedtime    MEDICATIONS  (PRN):  acetaminophen   Tablet .. 650 milliGRAM(s) Oral every 6 hours PRN Mild Pain (1 - 3)  aluminum hydroxide/magnesium hydroxide/simethicone Suspension 30 milliLiter(s) Oral every 4 hours PRN Dyspepsia  bisacodyl 5 milliGRAM(s) Oral every 12 hours PRN Constipation  dextrose 40% Gel 15 Gram(s) Oral once PRN Blood Glucose LESS THAN 70 milliGRAM(s)/deciliter  glucagon  Injectable 1 milliGRAM(s) IntraMuscular once PRN Glucose LESS THAN 70 milligrams/deciliter  oxycodone    5 mG/acetaminophen 325 mG 2 Tablet(s) Oral every 6 hours PRN Moderate Pain (4 - 6)      Care Discussed with Consultants/Other Providers [ ] YES  [ ] NO

## 2020-02-04 NOTE — PROGRESS NOTE ADULT - SUBJECTIVE AND OBJECTIVE BOX
Chief complaint  Patient is a 59y old  Male who presents with a chief complaint of 59M w/ n/v/d and hyperglycemia sent in from [facility] (03 Feb 2020 23:07)   Review of systems  Patient in bed, looks comfortable, no hypoglycemia.    Labs and Fingersticks  CAPILLARY BLOOD GLUCOSE      POCT Blood Glucose.: 144 mg/dL (04 Feb 2020 08:27)  POCT Blood Glucose.: 226 mg/dL (03 Feb 2020 21:50)  POCT Blood Glucose.: 164 mg/dL (03 Feb 2020 17:04)  POCT Blood Glucose.: 184 mg/dL (03 Feb 2020 12:27)                        Medications  MEDICATIONS  (STANDING):  albuterol/ipratropium for Nebulization 3 milliLiter(s) Nebulizer every 6 hours  aspirin enteric coated 81 milliGRAM(s) Oral daily  atorvastatin 40 milliGRAM(s) Oral at bedtime  budesonide 160 MICROgram(s)/formoterol 4.5 MICROgram(s) Inhaler 2 Puff(s) Inhalation two times a day  dextrose 5%. 1000 milliLiter(s) (50 mL/Hr) IV Continuous <Continuous>  dextrose 50% Injectable 12.5 Gram(s) IV Push once  dextrose 50% Injectable 25 Gram(s) IV Push once  dextrose 50% Injectable 25 Gram(s) IV Push once  enoxaparin Injectable 40 milliGRAM(s) SubCutaneous daily  furosemide    Tablet 20 milliGRAM(s) Oral daily  insulin glargine Injectable (LANTUS) 75 Unit(s) SubCutaneous at bedtime  insulin lispro (HumaLOG) corrective regimen sliding scale   SubCutaneous three times a day before meals  insulin lispro (HumaLOG) corrective regimen sliding scale   SubCutaneous at bedtime  insulin lispro Injectable (HumaLOG) 30 Unit(s) SubCutaneous three times a day with meals  lisinopril 2.5 milliGRAM(s) Oral daily  melatonin 5 milliGRAM(s) Oral at bedtime  nicotine - Inhaler 1 Each Inhalation daily  nystatin Powder 1 Application(s) Topical two times a day  pantoprazole    Tablet 40 milliGRAM(s) Oral daily  polyethylene glycol 3350 17 Gram(s) Oral every 12 hours  risperiDONE   Tablet 0.5 milliGRAM(s) Oral three times a day  senna 2 Tablet(s) Oral at bedtime  sertraline 50 milliGRAM(s) Oral at bedtime      Physical Exam  General: Patient comfortable in bed  Vital Signs Last 12 Hrs  T(F): 97.7 (02-04-20 @ 05:19), Max: 97.9 (02-04-20 @ 00:23)  HR: 81 (02-04-20 @ 05:19) (76 - 81)  BP: 117/72 (02-04-20 @ 05:19) (117/72 - 117/72)  BP(mean): --  RR: 19 (02-04-20 @ 05:19) (18 - 19)  SpO2: 97% (02-04-20 @ 05:19) (97% - 97%)  Neck: No palpable thyroid nodules.  CVS: S1S2, No murmurs  Respiratory: No wheezing, no crepitations  GI: Abdomen soft, bowel sounds positive  Musculoskeletal:  edema lower extremities.   Skin: No skin rashes, no ecchymosis    Diagnostics Chief complaint  Patient is a 59y old  Male who presents with a  chief complaint of 59M w/ n/v/d and hyperglycemia sent in from [facility] (03 Feb 2020 23:07)   Review of systems  Patient in bed, looks comfortable, no hypoglycemia.    Labs and Fingersticks  CAPILLARY BLOOD GLUCOSE      POCT Blood Glucose.: 144 mg/dL (04 Feb 2020 08:27)  POCT Blood Glucose.: 226 mg/dL (03 Feb 2020 21:50)  POCT Blood Glucose.: 164 mg/dL (03 Feb 2020 17:04)  POCT Blood Glucose.: 184 mg/dL (03 Feb 2020 12:27)                        Medications  MEDICATIONS  (STANDING):  albuterol/ipratropium for Nebulization 3 milliLiter(s) Nebulizer every 6 hours  aspirin enteric coated 81 milliGRAM(s) Oral daily  atorvastatin 40 milliGRAM(s) Oral at bedtime  budesonide 160 MICROgram(s)/formoterol 4.5 MICROgram(s) Inhaler 2 Puff(s) Inhalation two times a day  dextrose 5%. 1000 milliLiter(s) (50 mL/Hr) IV Continuous <Continuous>  dextrose 50% Injectable 12.5 Gram(s) IV Push once  dextrose 50% Injectable 25 Gram(s) IV Push once  dextrose 50% Injectable 25 Gram(s) IV Push once  enoxaparin Injectable 40 milliGRAM(s) SubCutaneous daily  furosemide    Tablet 20 milliGRAM(s) Oral daily  insulin glargine Injectable (LANTUS) 75 Unit(s) SubCutaneous at bedtime  insulin lispro (HumaLOG) corrective regimen sliding scale   SubCutaneous three times a day before meals  insulin lispro (HumaLOG) corrective regimen sliding scale   SubCutaneous at bedtime  insulin lispro Injectable (HumaLOG) 30 Unit(s) SubCutaneous three times a day with meals  lisinopril 2.5 milliGRAM(s) Oral daily  melatonin 5 milliGRAM(s) Oral at bedtime  nicotine - Inhaler 1 Each Inhalation daily  nystatin Powder 1 Application(s) Topical two times a day  pantoprazole    Tablet 40 milliGRAM(s) Oral daily  polyethylene glycol 3350 17 Gram(s) Oral every 12 hours  risperiDONE   Tablet 0.5 milliGRAM(s) Oral three times a day  senna 2 Tablet(s) Oral at bedtime  sertraline 50 milliGRAM(s) Oral at bedtime      Physical Exam  General: Patient comfortable in bed  Vital Signs Last 12 Hrs  T(F): 97.7 (02-04-20 @ 05:19), Max: 97.9 (02-04-20 @ 00:23)  HR: 81 (02-04-20 @ 05:19) (76 - 81)  BP: 117/72 (02-04-20 @ 05:19) (117/72 - 117/72)  BP(mean): --  RR: 19 (02-04-20 @ 05:19) (18 - 19)  SpO2: 97% (02-04-20 @ 05:19) (97% - 97%)  Neck: No palpable thyroid nodules.  CVS: S1S2, No murmurs  Respiratory: No wheezing, no crepitations  GI: Abdomen soft, bowel sounds positive  Musculoskeletal:  edema lower extremities.   Skin: No skin rashes, no ecchymosis    Diagnostics

## 2020-02-05 LAB
GLUCOSE BLDC GLUCOMTR-MCNC: 142 MG/DL — HIGH (ref 70–99)
GLUCOSE BLDC GLUCOMTR-MCNC: 180 MG/DL — HIGH (ref 70–99)
GLUCOSE BLDC GLUCOMTR-MCNC: 181 MG/DL — HIGH (ref 70–99)
GLUCOSE BLDC GLUCOMTR-MCNC: 240 MG/DL — HIGH (ref 70–99)

## 2020-02-05 RX ADMIN — Medication 3 MILLILITER(S): at 00:23

## 2020-02-05 RX ADMIN — NYSTATIN CREAM 1 APPLICATION(S): 100000 CREAM TOPICAL at 17:07

## 2020-02-05 RX ADMIN — NYSTATIN CREAM 1 APPLICATION(S): 100000 CREAM TOPICAL at 05:44

## 2020-02-05 RX ADMIN — Medication 3 MILLILITER(S): at 11:23

## 2020-02-05 RX ADMIN — Medication 650 MILLIGRAM(S): at 11:56

## 2020-02-05 RX ADMIN — Medication 81 MILLIGRAM(S): at 11:23

## 2020-02-05 RX ADMIN — OXYCODONE AND ACETAMINOPHEN 2 TABLET(S): 5; 325 TABLET ORAL at 05:50

## 2020-02-05 RX ADMIN — POLYETHYLENE GLYCOL 3350 17 GRAM(S): 17 POWDER, FOR SOLUTION ORAL at 17:06

## 2020-02-05 RX ADMIN — Medication 3 MILLILITER(S): at 22:31

## 2020-02-05 RX ADMIN — OXYCODONE AND ACETAMINOPHEN 2 TABLET(S): 5; 325 TABLET ORAL at 14:10

## 2020-02-05 RX ADMIN — BUDESONIDE AND FORMOTEROL FUMARATE DIHYDRATE 2 PUFF(S): 160; 4.5 AEROSOL RESPIRATORY (INHALATION) at 17:07

## 2020-02-05 RX ADMIN — Medication 5 MILLIGRAM(S): at 22:28

## 2020-02-05 RX ADMIN — ENOXAPARIN SODIUM 40 MILLIGRAM(S): 100 INJECTION SUBCUTANEOUS at 11:23

## 2020-02-05 RX ADMIN — RISPERIDONE 0.5 MILLIGRAM(S): 4 TABLET ORAL at 05:44

## 2020-02-05 RX ADMIN — Medication 1: at 08:29

## 2020-02-05 RX ADMIN — RISPERIDONE 0.5 MILLIGRAM(S): 4 TABLET ORAL at 13:40

## 2020-02-05 RX ADMIN — OXYCODONE AND ACETAMINOPHEN 2 TABLET(S): 5; 325 TABLET ORAL at 13:40

## 2020-02-05 RX ADMIN — Medication 30 UNIT(S): at 12:54

## 2020-02-05 RX ADMIN — INSULIN GLARGINE 75 UNIT(S): 100 INJECTION, SOLUTION SUBCUTANEOUS at 22:28

## 2020-02-05 RX ADMIN — Medication 20 MILLIGRAM(S): at 05:44

## 2020-02-05 RX ADMIN — PANTOPRAZOLE SODIUM 40 MILLIGRAM(S): 20 TABLET, DELAYED RELEASE ORAL at 11:23

## 2020-02-05 RX ADMIN — RISPERIDONE 0.5 MILLIGRAM(S): 4 TABLET ORAL at 22:29

## 2020-02-05 RX ADMIN — Medication 3 MILLILITER(S): at 05:43

## 2020-02-05 RX ADMIN — Medication 30 UNIT(S): at 08:29

## 2020-02-05 RX ADMIN — OXYCODONE AND ACETAMINOPHEN 2 TABLET(S): 5; 325 TABLET ORAL at 20:26

## 2020-02-05 RX ADMIN — Medication 0: at 22:28

## 2020-02-05 RX ADMIN — OXYCODONE AND ACETAMINOPHEN 2 TABLET(S): 5; 325 TABLET ORAL at 06:41

## 2020-02-05 RX ADMIN — Medication 1: at 18:02

## 2020-02-05 RX ADMIN — OXYCODONE AND ACETAMINOPHEN 2 TABLET(S): 5; 325 TABLET ORAL at 21:00

## 2020-02-05 RX ADMIN — Medication 650 MILLIGRAM(S): at 11:24

## 2020-02-05 RX ADMIN — SERTRALINE 50 MILLIGRAM(S): 25 TABLET, FILM COATED ORAL at 22:29

## 2020-02-05 RX ADMIN — BUDESONIDE AND FORMOTEROL FUMARATE DIHYDRATE 2 PUFF(S): 160; 4.5 AEROSOL RESPIRATORY (INHALATION) at 05:44

## 2020-02-05 RX ADMIN — Medication 3 MILLILITER(S): at 17:07

## 2020-02-05 RX ADMIN — ATORVASTATIN CALCIUM 40 MILLIGRAM(S): 80 TABLET, FILM COATED ORAL at 22:29

## 2020-02-05 RX ADMIN — LISINOPRIL 2.5 MILLIGRAM(S): 2.5 TABLET ORAL at 05:44

## 2020-02-05 RX ADMIN — Medication 30 UNIT(S): at 18:02

## 2020-02-05 NOTE — PROGRESS NOTE ADULT - PROBLEM SELECTOR PLAN 1
Will continue current insulin regimen for now. Will continue monitoring FS, log, and FU.  DC to rehab on current insulin regimen and FU endo 4 weeks.  Patient counseled for compliance with consistent low carb diet and exercise as tolerated outpatient. Will continue current insulin regimen for now. Will continue monitoring FS, log, and FU.

## 2020-02-05 NOTE — PROGRESS NOTE ADULT - SUBJECTIVE AND OBJECTIVE BOX
Patient is a 59y old  Male who presents with a chief complaint of 59M w/ n/v/d and hyperglycemia sent in from [facility] (05 Feb 2020 14:57)      INTERVAL HPI/OVERNIGHT EVENTS:  T(C): 36.3 (02-05-20 @ 19:20), Max: 36.9 (02-05-20 @ 00:08)  HR: 85 (02-05-20 @ 19:20) (85 - 90)  BP: 109/71 (02-05-20 @ 19:20) (100/68 - 126/71)  RR: 20 (02-05-20 @ 19:20) (18 - 20)  SpO2: 97% (02-05-20 @ 19:20) (97% - 98%)  Wt(kg): --  I&O's Summary    04 Feb 2020 07:01  -  05 Feb 2020 07:00  --------------------------------------------------------  IN: 1190 mL / OUT: 1600 mL / NET: -410 mL    05 Feb 2020 07:01  -  05 Feb 2020 23:33  --------------------------------------------------------  IN: 980 mL / OUT: 0 mL / NET: 980 mL        PAST MEDICAL & SURGICAL HISTORY:  Oxygen dependent  Gastroesophageal reflux disease, esophagitis presence not specified  Smoker  Bipolar 1 disorder  Coronary artery disease involving native coronary artery of native heart without angina pectoris  Type 2 diabetes mellitus without complication, with long-term current use of insulin  Pulmonary HTN  HLD (hyperlipidemia)  Stented coronary artery  COPD (chronic obstructive pulmonary disease)  No significant past surgical history      SOCIAL HISTORY  Alcohol:  Tobacco:  Illicit substance use:    FAMILY HISTORY:    REVIEW OF SYSTEMS:  CONSTITUTIONAL: No fever, weight loss, or fatigue  EYES: No eye pain, visual disturbances, or discharge  ENMT:  No difficulty hearing, tinnitus, vertigo; No sinus or throat pain  NECK: No pain or stiffness  RESPIRATORY: No cough, wheezing, chills or hemoptysis; No shortness of breath  CARDIOVASCULAR: No chest pain, palpitations, dizziness, or leg swelling  GASTROINTESTINAL: No abdominal or epigastric pain. No nausea, vomiting, or hematemesis; No diarrhea or constipation. No melena or hematochezia.  GENITOURINARY: No dysuria, frequency, hematuria, or incontinence  NEUROLOGICAL: No headaches, memory loss, loss of strength, numbness, or tremors  SKIN: No itching, burning, rashes, or lesions   LYMPH NODES: No enlarged glands  ENDOCRINE: No heat or cold intolerance; No hair loss  MUSCULOSKELETAL: No joint pain or swelling; No muscle, back, or extremity pain  PSYCHIATRIC: No depression, anxiety, mood swings, or difficulty sleeping  HEME/LYMPH: No easy bruising, or bleeding gums  ALLERY AND IMMUNOLOGIC: No hives or eczema    RADIOLOGY & ADDITIONAL TESTS:    Imaging Personally Reviewed:  [ ] YES  [ ] NO    Consultant(s) Notes Reviewed:  [ ] YES  [ ] NO    PHYSICAL EXAM:  GENERAL: NAD, well-groomed, well-developed  HEAD:  Atraumatic, Normocephalic  EYES: EOMI, PERRLA, conjunctiva and sclera clear  ENMT: No tonsillar erythema, exudates, or enlargement; Moist mucous membranes, Good dentition, No lesions  NECK: Supple, No JVD, Normal thyroid  NERVOUS SYSTEM:  Alert & Oriented X3, Good concentration; Motor Strength 5/5 B/L upper and lower extremities; DTRs 2+ intact and symmetric  CHEST/LUNG: Clear to percussion bilaterally; No rales, rhonchi, wheezing, or rubs  HEART: Regular rate and rhythm; No murmurs, rubs, or gallops  ABDOMEN: Soft, Nontender, Nondistended; Bowel sounds present  EXTREMITIES:  2+ Peripheral Pulses, No clubbing, cyanosis, or edema  LYMPH: No lymphadenopathy noted  SKIN: No rashes or lesions    LABS:              CAPILLARY BLOOD GLUCOSE      POCT Blood Glucose.: 240 mg/dL (05 Feb 2020 21:48)  POCT Blood Glucose.: 181 mg/dL (05 Feb 2020 17:20)  POCT Blood Glucose.: 142 mg/dL (05 Feb 2020 12:46)  POCT Blood Glucose.: 180 mg/dL (05 Feb 2020 08:24)            MEDICATIONS  (STANDING):  albuterol/ipratropium for Nebulization 3 milliLiter(s) Nebulizer every 6 hours  aspirin enteric coated 81 milliGRAM(s) Oral daily  atorvastatin 40 milliGRAM(s) Oral at bedtime  budesonide 160 MICROgram(s)/formoterol 4.5 MICROgram(s) Inhaler 2 Puff(s) Inhalation two times a day  dextrose 5%. 1000 milliLiter(s) (50 mL/Hr) IV Continuous <Continuous>  dextrose 50% Injectable 12.5 Gram(s) IV Push once  dextrose 50% Injectable 25 Gram(s) IV Push once  dextrose 50% Injectable 25 Gram(s) IV Push once  enoxaparin Injectable 40 milliGRAM(s) SubCutaneous daily  furosemide    Tablet 20 milliGRAM(s) Oral daily  insulin glargine Injectable (LANTUS) 75 Unit(s) SubCutaneous at bedtime  insulin lispro (HumaLOG) corrective regimen sliding scale   SubCutaneous three times a day before meals  insulin lispro (HumaLOG) corrective regimen sliding scale   SubCutaneous at bedtime  insulin lispro Injectable (HumaLOG) 30 Unit(s) SubCutaneous three times a day with meals  lisinopril 2.5 milliGRAM(s) Oral daily  melatonin 5 milliGRAM(s) Oral at bedtime  nicotine - Inhaler 1 Each Inhalation daily  nystatin Powder 1 Application(s) Topical two times a day  pantoprazole    Tablet 40 milliGRAM(s) Oral daily  polyethylene glycol 3350 17 Gram(s) Oral every 12 hours  risperiDONE   Tablet 0.5 milliGRAM(s) Oral three times a day  senna 2 Tablet(s) Oral at bedtime  sertraline 50 milliGRAM(s) Oral at bedtime    MEDICATIONS  (PRN):  acetaminophen   Tablet .. 650 milliGRAM(s) Oral every 6 hours PRN Mild Pain (1 - 3)  aluminum hydroxide/magnesium hydroxide/simethicone Suspension 30 milliLiter(s) Oral every 4 hours PRN Dyspepsia  bisacodyl 5 milliGRAM(s) Oral every 12 hours PRN Constipation  dextrose 40% Gel 15 Gram(s) Oral once PRN Blood Glucose LESS THAN 70 milliGRAM(s)/deciliter  glucagon  Injectable 1 milliGRAM(s) IntraMuscular once PRN Glucose LESS THAN 70 milligrams/deciliter  oxycodone    5 mG/acetaminophen 325 mG 2 Tablet(s) Oral every 6 hours PRN Moderate Pain (4 - 6)      Care Discussed with Consultants/Other Providers [ ] YES  [ ] NO

## 2020-02-05 NOTE — PROGRESS NOTE ADULT - ASSESSMENT
Assessment  DMT2: 59y Male with DM T2 with hyperglycemia, A1C 10.4%, on basal bolus insulin, blood sugars improving, FS in overall acceptable range, no hypoglycemic episodes. Patient is eating full meals with good appetite, alert, resting comfortably, still awaiting rehab placement. No acute events.  CAD: on medications, no chest pain, stable, monitored.  HTN: Controlled,  on antihypertensive medications.  Overweight/Obesity: No strict exercise routines, not on any weight loss program, neither on low  calorie diet.          Laury Romero MD  Cell: [phone]  Office: [phone] Assessment  DMT2: 59y Male with DM T2 with hyperglycemia, A1C 10.4%, on basal bolus insulin, blood sugars improving, FS in overall acceptable range, no hypoglycemic episodes.  Patient is eating full meals with good appetite, alert, resting comfortably, still awaiting rehab placement. No acute events.  CAD: on medications, no chest pain, stable, monitored.  HTN: Controlled,  on antihypertensive medications.  Overweight/Obesity: No strict exercise routines, not on any weight loss program, neither on low  calorie diet.          Laury Romero MD  Cell: [phone]  Office: [phone]

## 2020-02-05 NOTE — PROGRESS NOTE ADULT - SUBJECTIVE AND OBJECTIVE BOX
Chief complaint  Patient is a 59y old  Male who presents with a chief complaint of 59M w/ n/v/d and hyperglycemia sent in from [facility] (04 Feb 2020 19:14)   Review of systems  Patient in bed, looks comfortable, no hypoglycemia.    Labs and Fingersticks  CAPILLARY BLOOD GLUCOSE      POCT Blood Glucose.: 142 mg/dL (05 Feb 2020 12:46)  POCT Blood Glucose.: 180 mg/dL (05 Feb 2020 08:24)  POCT Blood Glucose.: 189 mg/dL (04 Feb 2020 22:06)  POCT Blood Glucose.: 153 mg/dL (04 Feb 2020 17:21)                        Medications  MEDICATIONS  (STANDING):  albuterol/ipratropium for Nebulization 3 milliLiter(s) Nebulizer every 6 hours  aspirin enteric coated 81 milliGRAM(s) Oral daily  atorvastatin 40 milliGRAM(s) Oral at bedtime  budesonide 160 MICROgram(s)/formoterol 4.5 MICROgram(s) Inhaler 2 Puff(s) Inhalation two times a day  dextrose 5%. 1000 milliLiter(s) (50 mL/Hr) IV Continuous <Continuous>  dextrose 50% Injectable 12.5 Gram(s) IV Push once  dextrose 50% Injectable 25 Gram(s) IV Push once  dextrose 50% Injectable 25 Gram(s) IV Push once  enoxaparin Injectable 40 milliGRAM(s) SubCutaneous daily  furosemide    Tablet 20 milliGRAM(s) Oral daily  insulin glargine Injectable (LANTUS) 75 Unit(s) SubCutaneous at bedtime  insulin lispro (HumaLOG) corrective regimen sliding scale   SubCutaneous three times a day before meals  insulin lispro (HumaLOG) corrective regimen sliding scale   SubCutaneous at bedtime  insulin lispro Injectable (HumaLOG) 30 Unit(s) SubCutaneous three times a day with meals  lisinopril 2.5 milliGRAM(s) Oral daily  melatonin 5 milliGRAM(s) Oral at bedtime  nicotine - Inhaler 1 Each Inhalation daily  nystatin Powder 1 Application(s) Topical two times a day  pantoprazole    Tablet 40 milliGRAM(s) Oral daily  polyethylene glycol 3350 17 Gram(s) Oral every 12 hours  risperiDONE   Tablet 0.5 milliGRAM(s) Oral three times a day  senna 2 Tablet(s) Oral at bedtime  sertraline 50 milliGRAM(s) Oral at bedtime      Physical Exam  General: Patient comfortable in bed  Vital Signs Last 12 Hrs  T(F): 97.6 (02-05-20 @ 13:26), Max: 98.5 (02-05-20 @ 05:36)  HR: 89 (02-05-20 @ 13:26) (89 - 89)  BP: 126/71 (02-05-20 @ 13:26) (123/85 - 126/71)  BP(mean): --  RR: 18 (02-05-20 @ 13:26) (18 - 18)  SpO2: 97% (02-05-20 @ 13:26) (97% - 98%)  Neck: No palpable thyroid nodules.  CVS: S1S2, No murmurs  Respiratory: No wheezing, no crepitations  GI: Abdomen soft, bowel sounds positive  Musculoskeletal:  edema lower extremities.   Skin: No skin rashes, no ecchymosis    Diagnostics Chief complaint  Patient is a 59y old  Male who presents with a chief complaint of  59M w/ n/v/d and hyperglycemia sent in from [facility] (04 Feb 2020 19:14)   Review of systems  Patient in bed, looks comfortable, no hypoglycemia.    Labs and Fingersticks  CAPILLARY BLOOD GLUCOSE      POCT Blood Glucose.: 142 mg/dL (05 Feb 2020 12:46)  POCT Blood Glucose.: 180 mg/dL (05 Feb 2020 08:24)  POCT Blood Glucose.: 189 mg/dL (04 Feb 2020 22:06)  POCT Blood Glucose.: 153 mg/dL (04 Feb 2020 17:21)                        Medications  MEDICATIONS  (STANDING):  albuterol/ipratropium for Nebulization 3 milliLiter(s) Nebulizer every 6 hours  aspirin enteric coated 81 milliGRAM(s) Oral daily  atorvastatin 40 milliGRAM(s) Oral at bedtime  budesonide 160 MICROgram(s)/formoterol 4.5 MICROgram(s) Inhaler 2 Puff(s) Inhalation two times a day  dextrose 5%. 1000 milliLiter(s) (50 mL/Hr) IV Continuous <Continuous>  dextrose 50% Injectable 12.5 Gram(s) IV Push once  dextrose 50% Injectable 25 Gram(s) IV Push once  dextrose 50% Injectable 25 Gram(s) IV Push once  enoxaparin Injectable 40 milliGRAM(s) SubCutaneous daily  furosemide    Tablet 20 milliGRAM(s) Oral daily  insulin glargine Injectable (LANTUS) 75 Unit(s) SubCutaneous at bedtime  insulin lispro (HumaLOG) corrective regimen sliding scale   SubCutaneous three times a day before meals  insulin lispro (HumaLOG) corrective regimen sliding scale   SubCutaneous at bedtime  insulin lispro Injectable (HumaLOG) 30 Unit(s) SubCutaneous three times a day with meals  lisinopril 2.5 milliGRAM(s) Oral daily  melatonin 5 milliGRAM(s) Oral at bedtime  nicotine - Inhaler 1 Each Inhalation daily  nystatin Powder 1 Application(s) Topical two times a day  pantoprazole    Tablet 40 milliGRAM(s) Oral daily  polyethylene glycol 3350 17 Gram(s) Oral every 12 hours  risperiDONE   Tablet 0.5 milliGRAM(s) Oral three times a day  senna 2 Tablet(s) Oral at bedtime  sertraline 50 milliGRAM(s) Oral at bedtime      Physical Exam  General: Patient comfortable in bed  Vital Signs Last 12 Hrs  T(F): 97.6 (02-05-20 @ 13:26), Max: 98.5 (02-05-20 @ 05:36)  HR: 89 (02-05-20 @ 13:26) (89 - 89)  BP: 126/71 (02-05-20 @ 13:26) (123/85 - 126/71)  BP(mean): --  RR: 18 (02-05-20 @ 13:26) (18 - 18)  SpO2: 97% (02-05-20 @ 13:26) (97% - 98%)  Neck: No palpable thyroid nodules.  CVS: S1S2, No murmurs  Respiratory: No wheezing, no crepitations  GI: Abdomen soft, bowel sounds positive  Musculoskeletal:  edema lower extremities.   Skin: No skin rashes, no ecchymosis    Diagnostics

## 2020-02-06 LAB
GLUCOSE BLDC GLUCOMTR-MCNC: 128 MG/DL — HIGH (ref 70–99)
GLUCOSE BLDC GLUCOMTR-MCNC: 140 MG/DL — HIGH (ref 70–99)
GLUCOSE BLDC GLUCOMTR-MCNC: 192 MG/DL — HIGH (ref 70–99)
GLUCOSE BLDC GLUCOMTR-MCNC: 271 MG/DL — HIGH (ref 70–99)

## 2020-02-06 RX ADMIN — Medication 30 UNIT(S): at 12:32

## 2020-02-06 RX ADMIN — OXYCODONE AND ACETAMINOPHEN 2 TABLET(S): 5; 325 TABLET ORAL at 13:08

## 2020-02-06 RX ADMIN — OXYCODONE AND ACETAMINOPHEN 2 TABLET(S): 5; 325 TABLET ORAL at 17:42

## 2020-02-06 RX ADMIN — ATORVASTATIN CALCIUM 40 MILLIGRAM(S): 80 TABLET, FILM COATED ORAL at 21:57

## 2020-02-06 RX ADMIN — RISPERIDONE 0.5 MILLIGRAM(S): 4 TABLET ORAL at 05:50

## 2020-02-06 RX ADMIN — POLYETHYLENE GLYCOL 3350 17 GRAM(S): 17 POWDER, FOR SOLUTION ORAL at 05:50

## 2020-02-06 RX ADMIN — INSULIN GLARGINE 75 UNIT(S): 100 INJECTION, SOLUTION SUBCUTANEOUS at 22:07

## 2020-02-06 RX ADMIN — Medication 650 MILLIGRAM(S): at 08:06

## 2020-02-06 RX ADMIN — Medication 1: at 22:08

## 2020-02-06 RX ADMIN — Medication 1: at 08:35

## 2020-02-06 RX ADMIN — RISPERIDONE 0.5 MILLIGRAM(S): 4 TABLET ORAL at 13:36

## 2020-02-06 RX ADMIN — OXYCODONE AND ACETAMINOPHEN 2 TABLET(S): 5; 325 TABLET ORAL at 06:30

## 2020-02-06 RX ADMIN — RISPERIDONE 0.5 MILLIGRAM(S): 4 TABLET ORAL at 21:58

## 2020-02-06 RX ADMIN — PANTOPRAZOLE SODIUM 40 MILLIGRAM(S): 20 TABLET, DELAYED RELEASE ORAL at 12:08

## 2020-02-06 RX ADMIN — OXYCODONE AND ACETAMINOPHEN 2 TABLET(S): 5; 325 TABLET ORAL at 12:08

## 2020-02-06 RX ADMIN — Medication 3 MILLILITER(S): at 12:07

## 2020-02-06 RX ADMIN — Medication 650 MILLIGRAM(S): at 09:06

## 2020-02-06 RX ADMIN — BUDESONIDE AND FORMOTEROL FUMARATE DIHYDRATE 2 PUFF(S): 160; 4.5 AEROSOL RESPIRATORY (INHALATION) at 05:51

## 2020-02-06 RX ADMIN — OXYCODONE AND ACETAMINOPHEN 2 TABLET(S): 5; 325 TABLET ORAL at 16:42

## 2020-02-06 RX ADMIN — Medication 3 MILLILITER(S): at 05:50

## 2020-02-06 RX ADMIN — ENOXAPARIN SODIUM 40 MILLIGRAM(S): 100 INJECTION SUBCUTANEOUS at 12:09

## 2020-02-06 RX ADMIN — NYSTATIN CREAM 1 APPLICATION(S): 100000 CREAM TOPICAL at 17:14

## 2020-02-06 RX ADMIN — Medication 3 MILLILITER(S): at 23:04

## 2020-02-06 RX ADMIN — Medication 30 UNIT(S): at 17:31

## 2020-02-06 RX ADMIN — Medication 5 MILLIGRAM(S): at 21:58

## 2020-02-06 RX ADMIN — Medication 3 MILLILITER(S): at 17:14

## 2020-02-06 RX ADMIN — Medication 81 MILLIGRAM(S): at 12:08

## 2020-02-06 RX ADMIN — NYSTATIN CREAM 1 APPLICATION(S): 100000 CREAM TOPICAL at 05:51

## 2020-02-06 RX ADMIN — OXYCODONE AND ACETAMINOPHEN 2 TABLET(S): 5; 325 TABLET ORAL at 05:49

## 2020-02-06 RX ADMIN — LISINOPRIL 2.5 MILLIGRAM(S): 2.5 TABLET ORAL at 05:52

## 2020-02-06 RX ADMIN — Medication 650 MILLIGRAM(S): at 02:00

## 2020-02-06 RX ADMIN — Medication 650 MILLIGRAM(S): at 01:16

## 2020-02-06 RX ADMIN — BUDESONIDE AND FORMOTEROL FUMARATE DIHYDRATE 2 PUFF(S): 160; 4.5 AEROSOL RESPIRATORY (INHALATION) at 17:14

## 2020-02-06 RX ADMIN — SENNA PLUS 2 TABLET(S): 8.6 TABLET ORAL at 21:58

## 2020-02-06 RX ADMIN — Medication 30 UNIT(S): at 08:34

## 2020-02-06 RX ADMIN — OXYCODONE AND ACETAMINOPHEN 2 TABLET(S): 5; 325 TABLET ORAL at 23:04

## 2020-02-06 RX ADMIN — Medication 20 MILLIGRAM(S): at 08:07

## 2020-02-06 RX ADMIN — SERTRALINE 50 MILLIGRAM(S): 25 TABLET, FILM COATED ORAL at 21:59

## 2020-02-06 NOTE — PROGRESS NOTE ADULT - SUBJECTIVE AND OBJECTIVE BOX
Patient is a 59y old  Male who presents with a chief complaint of 59M w/ n/v/d and hyperglycemia sent in from [facility] (06 Feb 2020 14:20)      INTERVAL HPI/OVERNIGHT EVENTS:  T(C): 36.6 (02-06-20 @ 17:50), Max: 36.6 (02-06-20 @ 17:50)  HR: 76 (02-06-20 @ 17:50) (69 - 78)  BP: 107/72 (02-06-20 @ 17:50) (100/66 - 108/72)  RR: 18 (02-06-20 @ 17:50) (18 - 20)  SpO2: 98% (02-06-20 @ 17:50) (97% - 98%)  Wt(kg): --  I&O's Summary    05 Feb 2020 07:01  -  06 Feb 2020 07:00  --------------------------------------------------------  IN: 1040 mL / OUT: 0 mL / NET: 1040 mL    06 Feb 2020 07:01  -  06 Feb 2020 20:20  --------------------------------------------------------  IN: 720 mL / OUT: 0 mL / NET: 720 mL        PAST MEDICAL & SURGICAL HISTORY:  Oxygen dependent  Gastroesophageal reflux disease, esophagitis presence not specified  Smoker  Bipolar 1 disorder  Coronary artery disease involving native coronary artery of native heart without angina pectoris  Type 2 diabetes mellitus without complication, with long-term current use of insulin  Pulmonary HTN  HLD (hyperlipidemia)  Stented coronary artery  COPD (chronic obstructive pulmonary disease)  No significant past surgical history      SOCIAL HISTORY  Alcohol:  Tobacco:  Illicit substance use:    FAMILY HISTORY:    REVIEW OF SYSTEMS:  CONSTITUTIONAL: No fever, weight loss, or fatigue  EYES: No eye pain, visual disturbances, or discharge  ENMT:  No difficulty hearing, tinnitus, vertigo; No sinus or throat pain  NECK: No pain or stiffness  RESPIRATORY: No cough, wheezing, chills or hemoptysis; No shortness of breath  CARDIOVASCULAR: No chest pain, palpitations, dizziness, or leg swelling  GASTROINTESTINAL: No abdominal or epigastric pain. No nausea, vomiting, or hematemesis; No diarrhea or constipation. No melena or hematochezia.  GENITOURINARY: No dysuria, frequency, hematuria, or incontinence  NEUROLOGICAL: No headaches, memory loss, loss of strength, numbness, or tremors  SKIN: No itching, burning, rashes, or lesions   LYMPH NODES: No enlarged glands  ENDOCRINE: No heat or cold intolerance; No hair loss  MUSCULOSKELETAL: No joint pain or swelling; No muscle, back, or extremity pain  PSYCHIATRIC: No depression, anxiety, mood swings, or difficulty sleeping  HEME/LYMPH: No easy bruising, or bleeding gums  ALLERY AND IMMUNOLOGIC: No hives or eczema    RADIOLOGY & ADDITIONAL TESTS:    Imaging Personally Reviewed:  [ ] YES  [ ] NO    Consultant(s) Notes Reviewed:  [ ] YES  [ ] NO    PHYSICAL EXAM:  GENERAL: NAD, well-groomed, well-developed  HEAD:  Atraumatic, Normocephalic  EYES: EOMI, PERRLA, conjunctiva and sclera clear  ENMT: No tonsillar erythema, exudates, or enlargement; Moist mucous membranes, Good dentition, No lesions  NECK: Supple, No JVD, Normal thyroid  NERVOUS SYSTEM:  Alert & Oriented X3, Good concentration; Motor Strength 5/5 B/L upper and lower extremities; DTRs 2+ intact and symmetric  CHEST/LUNG: Clear to percussion bilaterally; No rales, rhonchi, wheezing, or rubs  HEART: Regular rate and rhythm; No murmurs, rubs, or gallops  ABDOMEN: Soft, Nontender, Nondistended; Bowel sounds present  EXTREMITIES:  2+ Peripheral Pulses, No clubbing, cyanosis, or edema  LYMPH: No lymphadenopathy noted  SKIN: No rashes or lesions    LABS:              CAPILLARY BLOOD GLUCOSE      POCT Blood Glucose.: 128 mg/dL (06 Feb 2020 17:29)  POCT Blood Glucose.: 140 mg/dL (06 Feb 2020 12:23)  POCT Blood Glucose.: 192 mg/dL (06 Feb 2020 08:27)  POCT Blood Glucose.: 240 mg/dL (05 Feb 2020 21:48)            MEDICATIONS  (STANDING):  albuterol/ipratropium for Nebulization 3 milliLiter(s) Nebulizer every 6 hours  aspirin enteric coated 81 milliGRAM(s) Oral daily  atorvastatin 40 milliGRAM(s) Oral at bedtime  budesonide 160 MICROgram(s)/formoterol 4.5 MICROgram(s) Inhaler 2 Puff(s) Inhalation two times a day  dextrose 5%. 1000 milliLiter(s) (50 mL/Hr) IV Continuous <Continuous>  dextrose 50% Injectable 12.5 Gram(s) IV Push once  dextrose 50% Injectable 25 Gram(s) IV Push once  dextrose 50% Injectable 25 Gram(s) IV Push once  enoxaparin Injectable 40 milliGRAM(s) SubCutaneous daily  furosemide    Tablet 20 milliGRAM(s) Oral daily  insulin glargine Injectable (LANTUS) 75 Unit(s) SubCutaneous at bedtime  insulin lispro (HumaLOG) corrective regimen sliding scale   SubCutaneous three times a day before meals  insulin lispro (HumaLOG) corrective regimen sliding scale   SubCutaneous at bedtime  insulin lispro Injectable (HumaLOG) 30 Unit(s) SubCutaneous three times a day with meals  lisinopril 2.5 milliGRAM(s) Oral daily  melatonin 5 milliGRAM(s) Oral at bedtime  nicotine - Inhaler 1 Each Inhalation daily  nystatin Powder 1 Application(s) Topical two times a day  pantoprazole    Tablet 40 milliGRAM(s) Oral daily  polyethylene glycol 3350 17 Gram(s) Oral every 12 hours  risperiDONE   Tablet 0.5 milliGRAM(s) Oral three times a day  senna 2 Tablet(s) Oral at bedtime  sertraline 50 milliGRAM(s) Oral at bedtime    MEDICATIONS  (PRN):  acetaminophen   Tablet .. 650 milliGRAM(s) Oral every 6 hours PRN Mild Pain (1 - 3)  aluminum hydroxide/magnesium hydroxide/simethicone Suspension 30 milliLiter(s) Oral every 4 hours PRN Dyspepsia  bisacodyl 5 milliGRAM(s) Oral every 12 hours PRN Constipation  dextrose 40% Gel 15 Gram(s) Oral once PRN Blood Glucose LESS THAN 70 milliGRAM(s)/deciliter  glucagon  Injectable 1 milliGRAM(s) IntraMuscular once PRN Glucose LESS THAN 70 milligrams/deciliter  oxycodone    5 mG/acetaminophen 325 mG 2 Tablet(s) Oral every 6 hours PRN Moderate Pain (4 - 6)      Care Discussed with Consultants/Other Providers [ ] YES  [ ] NO

## 2020-02-06 NOTE — PROGRESS NOTE ADULT - SUBJECTIVE AND OBJECTIVE BOX
Chief complaint  Patient is a 59y old  Male who presents with a chief complaint of 59M w/ n/v/d and hyperglycemia sent in from [facility] (05 Feb 2020 23:32)   Review of systems  Patient in bed, looks comfortable, no hypoglycemia.    Labs and Fingersticks  CAPILLARY BLOOD GLUCOSE      POCT Blood Glucose.: 140 mg/dL (06 Feb 2020 12:23)  POCT Blood Glucose.: 192 mg/dL (06 Feb 2020 08:27)  POCT Blood Glucose.: 240 mg/dL (05 Feb 2020 21:48)  POCT Blood Glucose.: 181 mg/dL (05 Feb 2020 17:20)                        Medications  MEDICATIONS  (STANDING):  albuterol/ipratropium for Nebulization 3 milliLiter(s) Nebulizer every 6 hours  aspirin enteric coated 81 milliGRAM(s) Oral daily  atorvastatin 40 milliGRAM(s) Oral at bedtime  budesonide 160 MICROgram(s)/formoterol 4.5 MICROgram(s) Inhaler 2 Puff(s) Inhalation two times a day  dextrose 5%. 1000 milliLiter(s) (50 mL/Hr) IV Continuous <Continuous>  dextrose 50% Injectable 12.5 Gram(s) IV Push once  dextrose 50% Injectable 25 Gram(s) IV Push once  dextrose 50% Injectable 25 Gram(s) IV Push once  enoxaparin Injectable 40 milliGRAM(s) SubCutaneous daily  furosemide    Tablet 20 milliGRAM(s) Oral daily  insulin glargine Injectable (LANTUS) 75 Unit(s) SubCutaneous at bedtime  insulin lispro (HumaLOG) corrective regimen sliding scale   SubCutaneous three times a day before meals  insulin lispro (HumaLOG) corrective regimen sliding scale   SubCutaneous at bedtime  insulin lispro Injectable (HumaLOG) 30 Unit(s) SubCutaneous three times a day with meals  lisinopril 2.5 milliGRAM(s) Oral daily  melatonin 5 milliGRAM(s) Oral at bedtime  nicotine - Inhaler 1 Each Inhalation daily  nystatin Powder 1 Application(s) Topical two times a day  pantoprazole    Tablet 40 milliGRAM(s) Oral daily  polyethylene glycol 3350 17 Gram(s) Oral every 12 hours  risperiDONE   Tablet 0.5 milliGRAM(s) Oral three times a day  senna 2 Tablet(s) Oral at bedtime  sertraline 50 milliGRAM(s) Oral at bedtime      Physical Exam  General: Patient comfortable in bed  Vital Signs Last 12 Hrs  T(F): 97.7 (02-06-20 @ 11:55), Max: 97.7 (02-06-20 @ 05:43)  HR: 78 (02-06-20 @ 11:55) (69 - 78)  BP: 108/72 (02-06-20 @ 11:55) (100/66 - 108/72)  BP(mean): --  RR: 20 (02-06-20 @ 11:55) (18 - 20)  SpO2: 97% (02-06-20 @ 11:55) (97% - 98%)  Neck: No palpable thyroid nodules.  CVS: S1S2, No murmurs  Respiratory: No wheezing, no crepitations  GI: Abdomen soft, bowel sounds positive  Musculoskeletal:  edema lower extremities.   Skin: No skin rashes, no ecchymosis    Diagnostics Chief complaint  Patient is a 59y old  Male who presents with a chief complaint of 59M w/ n/v/d and hyperglycemia sent in from [facility] (05 Feb 2020 23:32)   Review of systems  Patient in bed, looks comfortable, no hypoglycemia.    Labs and Fingersticks  CAPILLARY BLOOD GLUCOSE      POCT Blood Glucose.: 140 mg/dL (06 Feb 2020 12:23)  POCT Blood Glucose.: 192 mg/dL (06 Feb 2020 08:27)  POCT Blood Glucose.: 240 mg/dL (05 Feb 2020 21:48)  POCT Blood Glucose.: 181 mg/dL (05 Feb 2020 17:20)      Medications  MEDICATIONS  (STANDING):  albuterol/ipratropium for Nebulization 3 milliLiter(s) Nebulizer every 6 hours  aspirin enteric coated 81 milliGRAM(s) Oral daily  atorvastatin 40 milliGRAM(s) Oral at bedtime  budesonide 160 MICROgram(s)/formoterol 4.5 MICROgram(s) Inhaler 2 Puff(s) Inhalation two times a day  dextrose 5%. 1000 milliLiter(s) (50 mL/Hr) IV Continuous <Continuous>  dextrose 50% Injectable 12.5 Gram(s) IV Push once  dextrose 50% Injectable 25 Gram(s) IV Push once  dextrose 50% Injectable 25 Gram(s) IV Push once  enoxaparin Injectable 40 milliGRAM(s) SubCutaneous daily  furosemide    Tablet 20 milliGRAM(s) Oral daily  insulin glargine Injectable (LANTUS) 75 Unit(s) SubCutaneous at bedtime  insulin lispro (HumaLOG) corrective regimen sliding scale   SubCutaneous three times a day before meals  insulin lispro (HumaLOG) corrective regimen sliding scale   SubCutaneous at bedtime  insulin lispro Injectable (HumaLOG) 30 Unit(s) SubCutaneous three times a day with meals  lisinopril 2.5 milliGRAM(s) Oral daily  melatonin 5 milliGRAM(s) Oral at bedtime  nicotine - Inhaler 1 Each Inhalation daily  nystatin Powder 1 Application(s) Topical two times a day  pantoprazole    Tablet 40 milliGRAM(s) Oral daily  polyethylene glycol 3350 17 Gram(s) Oral every 12 hours  risperiDONE   Tablet 0.5 milliGRAM(s) Oral three times a day  senna 2 Tablet(s) Oral at bedtime  sertraline 50 milliGRAM(s) Oral at bedtime      Physical Exam  General: Patient comfortable in bed  Vital Signs Last 12 Hrs  T(F): 97.7 (02-06-20 @ 11:55), Max: 97.7 (02-06-20 @ 05:43)  HR: 78 (02-06-20 @ 11:55) (69 - 78)  BP: 108/72 (02-06-20 @ 11:55) (100/66 - 108/72)  BP(mean): --  RR: 20 (02-06-20 @ 11:55) (18 - 20)  SpO2: 97% (02-06-20 @ 11:55) (97% - 98%)  Neck: No palpable thyroid nodules.  CVS: S1S2, No murmurs  Respiratory: No wheezing, no crepitations  GI: Abdomen soft, bowel sounds positive  Musculoskeletal:  edema lower extremities.   Skin: No skin rashes, no ecchymosis    Diagnostics

## 2020-02-07 LAB
GLUCOSE BLDC GLUCOMTR-MCNC: 141 MG/DL — HIGH (ref 70–99)
GLUCOSE BLDC GLUCOMTR-MCNC: 162 MG/DL — HIGH (ref 70–99)
GLUCOSE BLDC GLUCOMTR-MCNC: 167 MG/DL — HIGH (ref 70–99)
GLUCOSE BLDC GLUCOMTR-MCNC: 255 MG/DL — HIGH (ref 70–99)

## 2020-02-07 RX ADMIN — OXYCODONE AND ACETAMINOPHEN 2 TABLET(S): 5; 325 TABLET ORAL at 00:04

## 2020-02-07 RX ADMIN — SERTRALINE 50 MILLIGRAM(S): 25 TABLET, FILM COATED ORAL at 21:35

## 2020-02-07 RX ADMIN — Medication 3 MILLILITER(S): at 12:48

## 2020-02-07 RX ADMIN — OXYCODONE AND ACETAMINOPHEN 2 TABLET(S): 5; 325 TABLET ORAL at 13:07

## 2020-02-07 RX ADMIN — Medication 30 UNIT(S): at 12:47

## 2020-02-07 RX ADMIN — OXYCODONE AND ACETAMINOPHEN 2 TABLET(S): 5; 325 TABLET ORAL at 07:00

## 2020-02-07 RX ADMIN — ATORVASTATIN CALCIUM 40 MILLIGRAM(S): 80 TABLET, FILM COATED ORAL at 21:35

## 2020-02-07 RX ADMIN — RISPERIDONE 0.5 MILLIGRAM(S): 4 TABLET ORAL at 21:35

## 2020-02-07 RX ADMIN — INSULIN GLARGINE 75 UNIT(S): 100 INJECTION, SOLUTION SUBCUTANEOUS at 21:35

## 2020-02-07 RX ADMIN — OXYCODONE AND ACETAMINOPHEN 2 TABLET(S): 5; 325 TABLET ORAL at 14:07

## 2020-02-07 RX ADMIN — OXYCODONE AND ACETAMINOPHEN 2 TABLET(S): 5; 325 TABLET ORAL at 20:30

## 2020-02-07 RX ADMIN — OXYCODONE AND ACETAMINOPHEN 2 TABLET(S): 5; 325 TABLET ORAL at 20:03

## 2020-02-07 RX ADMIN — PANTOPRAZOLE SODIUM 40 MILLIGRAM(S): 20 TABLET, DELAYED RELEASE ORAL at 12:48

## 2020-02-07 RX ADMIN — Medication 3 MILLILITER(S): at 06:22

## 2020-02-07 RX ADMIN — Medication 3 MILLILITER(S): at 23:48

## 2020-02-07 RX ADMIN — OXYCODONE AND ACETAMINOPHEN 2 TABLET(S): 5; 325 TABLET ORAL at 06:21

## 2020-02-07 RX ADMIN — RISPERIDONE 0.5 MILLIGRAM(S): 4 TABLET ORAL at 13:07

## 2020-02-07 RX ADMIN — Medication 30 UNIT(S): at 08:31

## 2020-02-07 RX ADMIN — Medication 20 MILLIGRAM(S): at 06:21

## 2020-02-07 RX ADMIN — BUDESONIDE AND FORMOTEROL FUMARATE DIHYDRATE 2 PUFF(S): 160; 4.5 AEROSOL RESPIRATORY (INHALATION) at 06:22

## 2020-02-07 RX ADMIN — RISPERIDONE 0.5 MILLIGRAM(S): 4 TABLET ORAL at 06:21

## 2020-02-07 RX ADMIN — Medication 81 MILLIGRAM(S): at 12:48

## 2020-02-07 RX ADMIN — BUDESONIDE AND FORMOTEROL FUMARATE DIHYDRATE 2 PUFF(S): 160; 4.5 AEROSOL RESPIRATORY (INHALATION) at 17:27

## 2020-02-07 RX ADMIN — Medication 3 MILLILITER(S): at 17:27

## 2020-02-07 RX ADMIN — NYSTATIN CREAM 1 APPLICATION(S): 100000 CREAM TOPICAL at 06:18

## 2020-02-07 RX ADMIN — Medication 3: at 08:31

## 2020-02-07 RX ADMIN — SENNA PLUS 2 TABLET(S): 8.6 TABLET ORAL at 21:35

## 2020-02-07 RX ADMIN — Medication 1: at 12:48

## 2020-02-07 RX ADMIN — LISINOPRIL 2.5 MILLIGRAM(S): 2.5 TABLET ORAL at 06:24

## 2020-02-07 RX ADMIN — NYSTATIN CREAM 1 APPLICATION(S): 100000 CREAM TOPICAL at 17:27

## 2020-02-07 RX ADMIN — Medication 30 UNIT(S): at 17:27

## 2020-02-07 RX ADMIN — ENOXAPARIN SODIUM 40 MILLIGRAM(S): 100 INJECTION SUBCUTANEOUS at 12:48

## 2020-02-07 RX ADMIN — Medication 5 MILLIGRAM(S): at 21:35

## 2020-02-07 NOTE — PROGRESS NOTE ADULT - SUBJECTIVE AND OBJECTIVE BOX
Chief complaint  Patient is a 59y old  Male who presents with a chief complaint of 59M w/ n/v/d and hyperglycemia sent in from [facility] (06 Feb 2020 20:20)   Review of systems  Patient in bed, looks comfortable, no hypoglycemia.    Labs and Fingersticks  CAPILLARY BLOOD GLUCOSE      POCT Blood Glucose.: 167 mg/dL (07 Feb 2020 12:43)  POCT Blood Glucose.: 255 mg/dL (07 Feb 2020 08:17)  POCT Blood Glucose.: 271 mg/dL (06 Feb 2020 21:53)  POCT Blood Glucose.: 128 mg/dL (06 Feb 2020 17:29)                        Medications  MEDICATIONS  (STANDING):  albuterol/ipratropium for Nebulization 3 milliLiter(s) Nebulizer every 6 hours  aspirin enteric coated 81 milliGRAM(s) Oral daily  atorvastatin 40 milliGRAM(s) Oral at bedtime  budesonide 160 MICROgram(s)/formoterol 4.5 MICROgram(s) Inhaler 2 Puff(s) Inhalation two times a day  dextrose 5%. 1000 milliLiter(s) (50 mL/Hr) IV Continuous <Continuous>  dextrose 50% Injectable 12.5 Gram(s) IV Push once  dextrose 50% Injectable 25 Gram(s) IV Push once  dextrose 50% Injectable 25 Gram(s) IV Push once  enoxaparin Injectable 40 milliGRAM(s) SubCutaneous daily  furosemide    Tablet 20 milliGRAM(s) Oral daily  insulin glargine Injectable (LANTUS) 75 Unit(s) SubCutaneous at bedtime  insulin lispro (HumaLOG) corrective regimen sliding scale   SubCutaneous three times a day before meals  insulin lispro (HumaLOG) corrective regimen sliding scale   SubCutaneous at bedtime  insulin lispro Injectable (HumaLOG) 30 Unit(s) SubCutaneous three times a day with meals  lisinopril 2.5 milliGRAM(s) Oral daily  melatonin 5 milliGRAM(s) Oral at bedtime  nicotine - Inhaler 1 Each Inhalation daily  nystatin Powder 1 Application(s) Topical two times a day  pantoprazole    Tablet 40 milliGRAM(s) Oral daily  polyethylene glycol 3350 17 Gram(s) Oral every 12 hours  risperiDONE   Tablet 0.5 milliGRAM(s) Oral three times a day  senna 2 Tablet(s) Oral at bedtime  sertraline 50 milliGRAM(s) Oral at bedtime      Physical Exam  General: Patient comfortable in bed  Vital Signs Last 12 Hrs  T(F): 97.3 (02-07-20 @ 06:15), Max: 97.3 (02-07-20 @ 06:15)  HR: 87 (02-07-20 @ 06:15) (87 - 87)  BP: 115/78 (02-07-20 @ 06:15) (115/78 - 115/78)  BP(mean): --  RR: 19 (02-07-20 @ 06:15) (19 - 19)  SpO2: 96% (02-07-20 @ 06:15) (96% - 96%)  Neck: No palpable thyroid nodules.  CVS: S1S2, No murmurs  Respiratory: No wheezing, no crepitations  GI: Abdomen soft, bowel sounds positive  Musculoskeletal:  edema lower extremities.   Skin: No skin rashes, no ecchymosis    Diagnostics Chief complaint  Patient is a 59y old  Male who presents with a chief complaint of 59M w/ n/v/d and hyperglycemia sent in from [facility] (06 Feb 2020 20:20)   Review of systems  Patient in bed, looks comfortable,  no hypoglycemia.    Labs and Fingersticks  CAPILLARY BLOOD GLUCOSE      POCT Blood Glucose.: 167 mg/dL (07 Feb 2020 12:43)  POCT Blood Glucose.: 255 mg/dL (07 Feb 2020 08:17)  POCT Blood Glucose.: 271 mg/dL (06 Feb 2020 21:53)  POCT Blood Glucose.: 128 mg/dL (06 Feb 2020 17:29)                        Medications  MEDICATIONS  (STANDING):  albuterol/ipratropium for Nebulization 3 milliLiter(s) Nebulizer every 6 hours  aspirin enteric coated 81 milliGRAM(s) Oral daily  atorvastatin 40 milliGRAM(s) Oral at bedtime  budesonide 160 MICROgram(s)/formoterol 4.5 MICROgram(s) Inhaler 2 Puff(s) Inhalation two times a day  dextrose 5%. 1000 milliLiter(s) (50 mL/Hr) IV Continuous <Continuous>  dextrose 50% Injectable 12.5 Gram(s) IV Push once  dextrose 50% Injectable 25 Gram(s) IV Push once  dextrose 50% Injectable 25 Gram(s) IV Push once  enoxaparin Injectable 40 milliGRAM(s) SubCutaneous daily  furosemide    Tablet 20 milliGRAM(s) Oral daily  insulin glargine Injectable (LANTUS) 75 Unit(s) SubCutaneous at bedtime  insulin lispro (HumaLOG) corrective regimen sliding scale   SubCutaneous three times a day before meals  insulin lispro (HumaLOG) corrective regimen sliding scale   SubCutaneous at bedtime  insulin lispro Injectable (HumaLOG) 30 Unit(s) SubCutaneous three times a day with meals  lisinopril 2.5 milliGRAM(s) Oral daily  melatonin 5 milliGRAM(s) Oral at bedtime  nicotine - Inhaler 1 Each Inhalation daily  nystatin Powder 1 Application(s) Topical two times a day  pantoprazole    Tablet 40 milliGRAM(s) Oral daily  polyethylene glycol 3350 17 Gram(s) Oral every 12 hours  risperiDONE   Tablet 0.5 milliGRAM(s) Oral three times a day  senna 2 Tablet(s) Oral at bedtime  sertraline 50 milliGRAM(s) Oral at bedtime      Physical Exam  General: Patient comfortable in bed  Vital Signs Last 12 Hrs  T(F): 97.3 (02-07-20 @ 06:15), Max: 97.3 (02-07-20 @ 06:15)  HR: 87 (02-07-20 @ 06:15) (87 - 87)  BP: 115/78 (02-07-20 @ 06:15) (115/78 - 115/78)  BP(mean): --  RR: 19 (02-07-20 @ 06:15) (19 - 19)  SpO2: 96% (02-07-20 @ 06:15) (96% - 96%)  Neck: No palpable thyroid nodules.  CVS: S1S2, No murmurs  Respiratory: No wheezing, no crepitations  GI: Abdomen soft, bowel sounds positive  Musculoskeletal:  edema lower extremities.   Skin: No skin rashes, no ecchymosis    Diagnostics

## 2020-02-07 NOTE — PROGRESS NOTE ADULT - ASSESSMENT
Assessment  DMT2: 59y Male with DM T2 with hyperglycemia, A1C 10.4%, on basal bolus insulin, blood sugars improving, FS trending within overall acceptable range with some elevations, no hypoglycemic episodes. Patient is eating full meals with good appetite, alert, resting comfortably, DC pending, awaiting placement to shelter.  CAD: on medications, no chest pain, stable, monitored.  HTN: Controlled,  on antihypertensive medications.  Overweight/Obesity: No strict exercise routines, not on any weight loss program, neither on low  calorie diet.          Laury Romero MD  Cell: [phone]  Office: [phone] Assessment  DMT2: 59y Male with DM T2 with hyperglycemia, A1C 10.4%, on basal bolus insulin, blood sugars improving, FS trending within overall acceptable range with some elevations, no hypoglycemic episodes.  Patient is eating full meals with good appetite, alert, resting comfortably, DC pending, awaiting placement to shelter.  CAD: on medications, no chest pain, stable, monitored.  HTN: Controlled,  on antihypertensive medications.  Overweight/Obesity: No strict exercise routines, not on any weight loss program, neither on low  calorie diet.          Laury Romero MD  Cell: [phone]  Office: [phone]

## 2020-02-07 NOTE — PROGRESS NOTE ADULT - SUBJECTIVE AND OBJECTIVE BOX
Patient is a 59y old  Male who presents with a chief complaint of 59M w/ n/v/d and hyperglycemia sent in from [facility] (07 Feb 2020 13:36)      INTERVAL HPI/OVERNIGHT EVENTS:  T(C): 36.4 (02-07-20 @ 20:45), Max: 36.7 (02-07-20 @ 14:19)  HR: 84 (02-07-20 @ 20:45) (82 - 87)  BP: 110/73 (02-07-20 @ 20:45) (103/65 - 115/78)  RR: 18 (02-07-20 @ 20:45) (18 - 19)  SpO2: 97% (02-07-20 @ 20:45) (95% - 97%)  Wt(kg): --  I&O's Summary    06 Feb 2020 07:01  -  07 Feb 2020 07:00  --------------------------------------------------------  IN: 840 mL / OUT: 0 mL / NET: 840 mL    07 Feb 2020 07:01  -  07 Feb 2020 22:21  --------------------------------------------------------  IN: 1560 mL / OUT: 0 mL / NET: 1560 mL        PAST MEDICAL & SURGICAL HISTORY:  Oxygen dependent  Gastroesophageal reflux disease, esophagitis presence not specified  Smoker  Bipolar 1 disorder  Coronary artery disease involving native coronary artery of native heart without angina pectoris  Type 2 diabetes mellitus without complication, with long-term current use of insulin  Pulmonary HTN  HLD (hyperlipidemia)  Stented coronary artery  COPD (chronic obstructive pulmonary disease)  No significant past surgical history      SOCIAL HISTORY  Alcohol:  Tobacco:  Illicit substance use:    FAMILY HISTORY:    REVIEW OF SYSTEMS:  CONSTITUTIONAL: No fever, weight loss, or fatigue  EYES: No eye pain, visual disturbances, or discharge  ENMT:  No difficulty hearing, tinnitus, vertigo; No sinus or throat pain  NECK: No pain or stiffness  RESPIRATORY: No cough, wheezing, chills or hemoptysis; No shortness of breath  CARDIOVASCULAR: No chest pain, palpitations, dizziness, or leg swelling  GASTROINTESTINAL: No abdominal or epigastric pain. No nausea, vomiting, or hematemesis; No diarrhea or constipation. No melena or hematochezia.  GENITOURINARY: No dysuria, frequency, hematuria, or incontinence  NEUROLOGICAL: No headaches, memory loss, loss of strength, numbness, or tremors  SKIN: No itching, burning, rashes, or lesions   LYMPH NODES: No enlarged glands  ENDOCRINE: No heat or cold intolerance; No hair loss  MUSCULOSKELETAL: No joint pain or swelling; No muscle, back, or extremity pain  PSYCHIATRIC: No depression, anxiety, mood swings, or difficulty sleeping  HEME/LYMPH: No easy bruising, or bleeding gums  ALLERY AND IMMUNOLOGIC: No hives or eczema    RADIOLOGY & ADDITIONAL TESTS:    Imaging Personally Reviewed:  [ ] YES  [ ] NO    Consultant(s) Notes Reviewed:  [ ] YES  [ ] NO    PHYSICAL EXAM:  GENERAL: NAD, well-groomed, well-developed  HEAD:  Atraumatic, Normocephalic  EYES: EOMI, PERRLA, conjunctiva and sclera clear  ENMT: No tonsillar erythema, exudates, or enlargement; Moist mucous membranes, Good dentition, No lesions  NECK: Supple, No JVD, Normal thyroid  NERVOUS SYSTEM:  Alert & Oriented X3, Good concentration; Motor Strength 5/5 B/L upper and lower extremities; DTRs 2+ intact and symmetric  CHEST/LUNG: Clear to percussion bilaterally; No rales, rhonchi, wheezing, or rubs  HEART: Regular rate and rhythm; No murmurs, rubs, or gallops  ABDOMEN: Soft, Nontender, Nondistended; Bowel sounds present  EXTREMITIES:  2+ Peripheral Pulses, No clubbing, cyanosis, or edema  LYMPH: No lymphadenopathy noted  SKIN: No rashes or lesions    LABS:              CAPILLARY BLOOD GLUCOSE      POCT Blood Glucose.: 162 mg/dL (07 Feb 2020 21:33)  POCT Blood Glucose.: 141 mg/dL (07 Feb 2020 17:19)  POCT Blood Glucose.: 167 mg/dL (07 Feb 2020 12:43)  POCT Blood Glucose.: 255 mg/dL (07 Feb 2020 08:17)            MEDICATIONS  (STANDING):  albuterol/ipratropium for Nebulization 3 milliLiter(s) Nebulizer every 6 hours  aspirin enteric coated 81 milliGRAM(s) Oral daily  atorvastatin 40 milliGRAM(s) Oral at bedtime  budesonide 160 MICROgram(s)/formoterol 4.5 MICROgram(s) Inhaler 2 Puff(s) Inhalation two times a day  dextrose 5%. 1000 milliLiter(s) (50 mL/Hr) IV Continuous <Continuous>  dextrose 50% Injectable 12.5 Gram(s) IV Push once  dextrose 50% Injectable 25 Gram(s) IV Push once  dextrose 50% Injectable 25 Gram(s) IV Push once  enoxaparin Injectable 40 milliGRAM(s) SubCutaneous daily  furosemide    Tablet 20 milliGRAM(s) Oral daily  insulin glargine Injectable (LANTUS) 75 Unit(s) SubCutaneous at bedtime  insulin lispro (HumaLOG) corrective regimen sliding scale   SubCutaneous three times a day before meals  insulin lispro (HumaLOG) corrective regimen sliding scale   SubCutaneous at bedtime  insulin lispro Injectable (HumaLOG) 30 Unit(s) SubCutaneous three times a day with meals  lisinopril 2.5 milliGRAM(s) Oral daily  melatonin 5 milliGRAM(s) Oral at bedtime  nicotine - Inhaler 1 Each Inhalation daily  nystatin Powder 1 Application(s) Topical two times a day  pantoprazole    Tablet 40 milliGRAM(s) Oral daily  polyethylene glycol 3350 17 Gram(s) Oral every 12 hours  risperiDONE   Tablet 0.5 milliGRAM(s) Oral three times a day  senna 2 Tablet(s) Oral at bedtime  sertraline 50 milliGRAM(s) Oral at bedtime    MEDICATIONS  (PRN):  acetaminophen   Tablet .. 650 milliGRAM(s) Oral every 6 hours PRN Mild Pain (1 - 3)  aluminum hydroxide/magnesium hydroxide/simethicone Suspension 30 milliLiter(s) Oral every 4 hours PRN Dyspepsia  bisacodyl 5 milliGRAM(s) Oral every 12 hours PRN Constipation  dextrose 40% Gel 15 Gram(s) Oral once PRN Blood Glucose LESS THAN 70 milliGRAM(s)/deciliter  glucagon  Injectable 1 milliGRAM(s) IntraMuscular once PRN Glucose LESS THAN 70 milligrams/deciliter  oxycodone    5 mG/acetaminophen 325 mG 2 Tablet(s) Oral every 6 hours PRN Moderate Pain (4 - 6)      Care Discussed with Consultants/Other Providers [ ] YES  [ ] NO

## 2020-02-08 LAB
GLUCOSE BLDC GLUCOMTR-MCNC: 145 MG/DL — HIGH (ref 70–99)
GLUCOSE BLDC GLUCOMTR-MCNC: 166 MG/DL — HIGH (ref 70–99)
GLUCOSE BLDC GLUCOMTR-MCNC: 193 MG/DL — HIGH (ref 70–99)
GLUCOSE BLDC GLUCOMTR-MCNC: 199 MG/DL — HIGH (ref 70–99)

## 2020-02-08 RX ADMIN — INSULIN GLARGINE 75 UNIT(S): 100 INJECTION, SOLUTION SUBCUTANEOUS at 22:29

## 2020-02-08 RX ADMIN — OXYCODONE AND ACETAMINOPHEN 2 TABLET(S): 5; 325 TABLET ORAL at 06:06

## 2020-02-08 RX ADMIN — ENOXAPARIN SODIUM 40 MILLIGRAM(S): 100 INJECTION SUBCUTANEOUS at 11:35

## 2020-02-08 RX ADMIN — LISINOPRIL 2.5 MILLIGRAM(S): 2.5 TABLET ORAL at 06:06

## 2020-02-08 RX ADMIN — OXYCODONE AND ACETAMINOPHEN 2 TABLET(S): 5; 325 TABLET ORAL at 13:00

## 2020-02-08 RX ADMIN — Medication 5 MILLIGRAM(S): at 21:49

## 2020-02-08 RX ADMIN — BUDESONIDE AND FORMOTEROL FUMARATE DIHYDRATE 2 PUFF(S): 160; 4.5 AEROSOL RESPIRATORY (INHALATION) at 17:36

## 2020-02-08 RX ADMIN — Medication 650 MILLIGRAM(S): at 21:53

## 2020-02-08 RX ADMIN — OXYCODONE AND ACETAMINOPHEN 2 TABLET(S): 5; 325 TABLET ORAL at 12:30

## 2020-02-08 RX ADMIN — NYSTATIN CREAM 1 APPLICATION(S): 100000 CREAM TOPICAL at 06:06

## 2020-02-08 RX ADMIN — SERTRALINE 50 MILLIGRAM(S): 25 TABLET, FILM COATED ORAL at 21:49

## 2020-02-08 RX ADMIN — Medication 1: at 12:30

## 2020-02-08 RX ADMIN — Medication 81 MILLIGRAM(S): at 11:36

## 2020-02-08 RX ADMIN — Medication 3 MILLILITER(S): at 17:35

## 2020-02-08 RX ADMIN — Medication 30 UNIT(S): at 12:31

## 2020-02-08 RX ADMIN — OXYCODONE AND ACETAMINOPHEN 2 TABLET(S): 5; 325 TABLET ORAL at 06:35

## 2020-02-08 RX ADMIN — SENNA PLUS 2 TABLET(S): 8.6 TABLET ORAL at 21:49

## 2020-02-08 RX ADMIN — Medication 30 UNIT(S): at 08:30

## 2020-02-08 RX ADMIN — Medication 650 MILLIGRAM(S): at 22:35

## 2020-02-08 RX ADMIN — OXYCODONE AND ACETAMINOPHEN 2 TABLET(S): 5; 325 TABLET ORAL at 19:09

## 2020-02-08 RX ADMIN — Medication 3 MILLILITER(S): at 11:35

## 2020-02-08 RX ADMIN — OXYCODONE AND ACETAMINOPHEN 2 TABLET(S): 5; 325 TABLET ORAL at 18:39

## 2020-02-08 RX ADMIN — PANTOPRAZOLE SODIUM 40 MILLIGRAM(S): 20 TABLET, DELAYED RELEASE ORAL at 12:31

## 2020-02-08 RX ADMIN — ATORVASTATIN CALCIUM 40 MILLIGRAM(S): 80 TABLET, FILM COATED ORAL at 21:49

## 2020-02-08 RX ADMIN — Medication 20 MILLIGRAM(S): at 06:06

## 2020-02-08 RX ADMIN — Medication 1: at 08:30

## 2020-02-08 RX ADMIN — BUDESONIDE AND FORMOTEROL FUMARATE DIHYDRATE 2 PUFF(S): 160; 4.5 AEROSOL RESPIRATORY (INHALATION) at 06:08

## 2020-02-08 RX ADMIN — RISPERIDONE 0.5 MILLIGRAM(S): 4 TABLET ORAL at 13:44

## 2020-02-08 RX ADMIN — Medication 30 UNIT(S): at 17:35

## 2020-02-08 RX ADMIN — RISPERIDONE 0.5 MILLIGRAM(S): 4 TABLET ORAL at 06:06

## 2020-02-08 RX ADMIN — RISPERIDONE 0.5 MILLIGRAM(S): 4 TABLET ORAL at 21:49

## 2020-02-08 RX ADMIN — NYSTATIN CREAM 1 APPLICATION(S): 100000 CREAM TOPICAL at 17:36

## 2020-02-08 RX ADMIN — Medication 3 MILLILITER(S): at 06:09

## 2020-02-08 NOTE — PROGRESS NOTE ADULT - ASSESSMENT
Assessment  DMT2: 59y Male with DM T2 with hyperglycemia, A1C 10.4%, on basal bolus insulin, FS trending within overall acceptable range no hypoglycemic episodes.  Patient is eating full meals with good appetite, alert, resting comfortably,  DC pending, awaiting placement to shelter.  CAD: on medications, no chest pain, stable, monitored.  HTN: Controlled,  on antihypertensive medications.  Overweight/Obesity: No strict exercise routines, not on any weight loss program, neither on low  calorie diet.          aLury Romero MD  Cell: [phone]  Office: [phone]

## 2020-02-08 NOTE — PROGRESS NOTE ADULT - SUBJECTIVE AND OBJECTIVE BOX
Chief complaint  Patient is a 59y old  Male who presents with a chief complaint of 59M w/ n/v/d and hyperglycemia sent in from [facility] (07 Feb 2020 22:21)   Review of systems  Patient in bed, looks comfortable, no fever, no hypoglycemia.    Labs and Fingersticks  CAPILLARY BLOOD GLUCOSE      POCT Blood Glucose.: 193 mg/dL (08 Feb 2020 12:06)  POCT Blood Glucose.: 166 mg/dL (08 Feb 2020 08:13)  POCT Blood Glucose.: 162 mg/dL (07 Feb 2020 21:33)      Medications  MEDICATIONS  (STANDING):  albuterol/ipratropium for Nebulization 3 milliLiter(s) Nebulizer every 6 hours  aspirin enteric coated 81 milliGRAM(s) Oral daily  atorvastatin 40 milliGRAM(s) Oral at bedtime  budesonide 160 MICROgram(s)/formoterol 4.5 MICROgram(s) Inhaler 2 Puff(s) Inhalation two times a day  dextrose 5%. 1000 milliLiter(s) (50 mL/Hr) IV Continuous <Continuous>  dextrose 50% Injectable 12.5 Gram(s) IV Push once  dextrose 50% Injectable 25 Gram(s) IV Push once  dextrose 50% Injectable 25 Gram(s) IV Push once  enoxaparin Injectable 40 milliGRAM(s) SubCutaneous daily  furosemide    Tablet 20 milliGRAM(s) Oral daily  insulin glargine Injectable (LANTUS) 75 Unit(s) SubCutaneous at bedtime  insulin lispro (HumaLOG) corrective regimen sliding scale   SubCutaneous three times a day before meals  insulin lispro (HumaLOG) corrective regimen sliding scale   SubCutaneous at bedtime  insulin lispro Injectable (HumaLOG) 30 Unit(s) SubCutaneous three times a day with meals  lisinopril 2.5 milliGRAM(s) Oral daily  melatonin 5 milliGRAM(s) Oral at bedtime  nicotine - Inhaler 1 Each Inhalation daily  nystatin Powder 1 Application(s) Topical two times a day  pantoprazole    Tablet 40 milliGRAM(s) Oral daily  polyethylene glycol 3350 17 Gram(s) Oral every 12 hours  risperiDONE   Tablet 0.5 milliGRAM(s) Oral three times a day  senna 2 Tablet(s) Oral at bedtime  sertraline 50 milliGRAM(s) Oral at bedtime      Physical Exam  General: Patient comfortable in bed  Vital Signs Last 12 Hrs  T(F): 98.2 (02-08-20 @ 12:47), Max: 98.2 (02-08-20 @ 12:47)  HR: 81 (02-08-20 @ 12:47) (74 - 81)  BP: 108/74 (02-08-20 @ 12:47) (96/61 - 108/74)  BP(mean): --  RR: 18 (02-08-20 @ 12:47) (18 - 18)  SpO2: 97% (02-08-20 @ 12:47) (97% - 97%)  Neck: No palpable thyroid nodules.  CVS: S1S2, No murmurs  Respiratory: No wheezing, no crepitations  GI: Abdomen soft, bowel sounds positive  Musculoskeletal:  edema lower extremities.   Skin: No skin rashes, no ecchymosis    Diagnostics

## 2020-02-08 NOTE — PROGRESS NOTE ADULT - SUBJECTIVE AND OBJECTIVE BOX
Patient is a 59y old  Male who presents with a chief complaint of 59M w/ n/v/d and hyperglycemia sent in from [facility] (08 Feb 2020 17:27)    pt. seen and examined  no c/o    INTERVAL HPI/OVERNIGHT EVENTS:  T(C): 36.8 (02-08-20 @ 12:47), Max: 36.8 (02-08-20 @ 12:47)  HR: 81 (02-08-20 @ 12:47) (74 - 84)  BP: 108/74 (02-08-20 @ 12:47) (96/61 - 110/73)  RR: 18 (02-08-20 @ 12:47) (18 - 18)  SpO2: 97% (02-08-20 @ 12:47) (97% - 97%)  Wt(kg): --  I&O's Summary    07 Feb 2020 07:01  -  08 Feb 2020 07:00  --------------------------------------------------------  IN: 1560 mL / OUT: 0 mL / NET: 1560 mL    08 Feb 2020 07:01  -  08 Feb 2020 18:41  --------------------------------------------------------  IN: 480 mL / OUT: 0 mL / NET: 480 mL        PAST MEDICAL & SURGICAL HISTORY:  Oxygen dependent  Gastroesophageal reflux disease, esophagitis presence not specified  Smoker  Bipolar 1 disorder  Coronary artery disease involving native coronary artery of native heart without angina pectoris  Type 2 diabetes mellitus without complication, with long-term current use of insulin  Pulmonary HTN  HLD (hyperlipidemia)  Stented coronary artery  COPD (chronic obstructive pulmonary disease)  No significant past surgical history      SOCIAL HISTORY  Alcohol:  Tobacco:  Illicit substance use:    FAMILY HISTORY:    REVIEW OF SYSTEMS:  CONSTITUTIONAL: No fever, weight loss, or fatigue  EYES: No eye pain, visual disturbances, or discharge  ENMT:  No difficulty hearing, tinnitus, vertigo; No sinus or throat pain  NECK: No pain or stiffness  RESPIRATORY: No cough, wheezing, chills or hemoptysis; No shortness of breath  CARDIOVASCULAR: No chest pain, palpitations, dizziness, or leg swelling  GASTROINTESTINAL: No abdominal or epigastric pain. No nausea, vomiting, or hematemesis; No diarrhea or constipation. No melena or hematochezia.  GENITOURINARY: No dysuria, frequency, hematuria, or incontinence  NEUROLOGICAL: No headaches, memory loss, loss of strength, numbness, or tremors  SKIN: No itching, burning, rashes, or lesions   LYMPH NODES: No enlarged glands  ENDOCRINE: No heat or cold intolerance; No hair loss  MUSCULOSKELETAL: No joint pain or swelling; No muscle, back, or extremity pain  PSYCHIATRIC: No depression, anxiety, mood swings, or difficulty sleeping  HEME/LYMPH: No easy bruising, or bleeding gums  ALLERY AND IMMUNOLOGIC: No hives or eczema    RADIOLOGY & ADDITIONAL TESTS:    Imaging Personally Reviewed:  [ ] YES  [ ] NO    Consultant(s) Notes Reviewed:  [ ] YES  [ ] NO    PHYSICAL EXAM:  GENERAL: NAD, well-groomed, well-developed  HEAD:  Atraumatic, Normocephalic  EYES: EOMI, PERRLA, conjunctiva and sclera clear  ENMT: No tonsillar erythema, exudates, or enlargement; Moist mucous membranes, Good dentition, No lesions  NECK: Supple, No JVD, Normal thyroid  NERVOUS SYSTEM:  Alert & Oriented X3, Good concentration; Motor Strength 5/5 B/L upper and lower extremities; DTRs 2+ intact and symmetric  CHEST/LUNG: Clear to percussion bilaterally; No rales, rhonchi, wheezing, or rubs  HEART: Regular rate and rhythm; No murmurs, rubs, or gallops  ABDOMEN: Soft, Nontender, Nondistended; Bowel sounds present  EXTREMITIES:  2+ Peripheral Pulses, No clubbing, cyanosis, or edema  LYMPH: No lymphadenopathy noted  SKIN: No rashes or lesions    LABS:              CAPILLARY BLOOD GLUCOSE      POCT Blood Glucose.: 145 mg/dL (08 Feb 2020 17:26)  POCT Blood Glucose.: 193 mg/dL (08 Feb 2020 12:06)  POCT Blood Glucose.: 166 mg/dL (08 Feb 2020 08:13)  POCT Blood Glucose.: 162 mg/dL (07 Feb 2020 21:33)            MEDICATIONS  (STANDING):  albuterol/ipratropium for Nebulization 3 milliLiter(s) Nebulizer every 6 hours  aspirin enteric coated 81 milliGRAM(s) Oral daily  atorvastatin 40 milliGRAM(s) Oral at bedtime  budesonide 160 MICROgram(s)/formoterol 4.5 MICROgram(s) Inhaler 2 Puff(s) Inhalation two times a day  dextrose 5%. 1000 milliLiter(s) (50 mL/Hr) IV Continuous <Continuous>  dextrose 50% Injectable 12.5 Gram(s) IV Push once  dextrose 50% Injectable 25 Gram(s) IV Push once  dextrose 50% Injectable 25 Gram(s) IV Push once  enoxaparin Injectable 40 milliGRAM(s) SubCutaneous daily  furosemide    Tablet 20 milliGRAM(s) Oral daily  insulin glargine Injectable (LANTUS) 75 Unit(s) SubCutaneous at bedtime  insulin lispro (HumaLOG) corrective regimen sliding scale   SubCutaneous three times a day before meals  insulin lispro (HumaLOG) corrective regimen sliding scale   SubCutaneous at bedtime  insulin lispro Injectable (HumaLOG) 30 Unit(s) SubCutaneous three times a day with meals  lisinopril 2.5 milliGRAM(s) Oral daily  melatonin 5 milliGRAM(s) Oral at bedtime  nicotine - Inhaler 1 Each Inhalation daily  nystatin Powder 1 Application(s) Topical two times a day  pantoprazole    Tablet 40 milliGRAM(s) Oral daily  polyethylene glycol 3350 17 Gram(s) Oral every 12 hours  risperiDONE   Tablet 0.5 milliGRAM(s) Oral three times a day  senna 2 Tablet(s) Oral at bedtime  sertraline 50 milliGRAM(s) Oral at bedtime    MEDICATIONS  (PRN):  acetaminophen   Tablet .. 650 milliGRAM(s) Oral every 6 hours PRN Mild Pain (1 - 3)  aluminum hydroxide/magnesium hydroxide/simethicone Suspension 30 milliLiter(s) Oral every 4 hours PRN Dyspepsia  bisacodyl 5 milliGRAM(s) Oral every 12 hours PRN Constipation  dextrose 40% Gel 15 Gram(s) Oral once PRN Blood Glucose LESS THAN 70 milliGRAM(s)/deciliter  glucagon  Injectable 1 milliGRAM(s) IntraMuscular once PRN Glucose LESS THAN 70 milligrams/deciliter  oxycodone    5 mG/acetaminophen 325 mG 2 Tablet(s) Oral every 6 hours PRN Moderate Pain (4 - 6)      Care Discussed with Consultants/Other Providers [ ] YES  [ ] NO

## 2020-02-09 LAB
GLUCOSE BLDC GLUCOMTR-MCNC: 140 MG/DL — HIGH (ref 70–99)
GLUCOSE BLDC GLUCOMTR-MCNC: 171 MG/DL — HIGH (ref 70–99)
GLUCOSE BLDC GLUCOMTR-MCNC: 197 MG/DL — HIGH (ref 70–99)
GLUCOSE BLDC GLUCOMTR-MCNC: 197 MG/DL — HIGH (ref 70–99)

## 2020-02-09 RX ADMIN — OXYCODONE AND ACETAMINOPHEN 2 TABLET(S): 5; 325 TABLET ORAL at 18:55

## 2020-02-09 RX ADMIN — Medication 30 UNIT(S): at 17:46

## 2020-02-09 RX ADMIN — NYSTATIN CREAM 1 APPLICATION(S): 100000 CREAM TOPICAL at 06:31

## 2020-02-09 RX ADMIN — OXYCODONE AND ACETAMINOPHEN 2 TABLET(S): 5; 325 TABLET ORAL at 01:50

## 2020-02-09 RX ADMIN — RISPERIDONE 0.5 MILLIGRAM(S): 4 TABLET ORAL at 06:30

## 2020-02-09 RX ADMIN — INSULIN GLARGINE 75 UNIT(S): 100 INJECTION, SOLUTION SUBCUTANEOUS at 21:58

## 2020-02-09 RX ADMIN — Medication 3 MILLILITER(S): at 12:33

## 2020-02-09 RX ADMIN — OXYCODONE AND ACETAMINOPHEN 2 TABLET(S): 5; 325 TABLET ORAL at 18:03

## 2020-02-09 RX ADMIN — Medication 3 MILLILITER(S): at 06:31

## 2020-02-09 RX ADMIN — Medication 1: at 08:10

## 2020-02-09 RX ADMIN — Medication 20 MILLIGRAM(S): at 06:30

## 2020-02-09 RX ADMIN — ENOXAPARIN SODIUM 40 MILLIGRAM(S): 100 INJECTION SUBCUTANEOUS at 12:33

## 2020-02-09 RX ADMIN — OXYCODONE AND ACETAMINOPHEN 2 TABLET(S): 5; 325 TABLET ORAL at 01:10

## 2020-02-09 RX ADMIN — Medication 3 MILLILITER(S): at 01:10

## 2020-02-09 RX ADMIN — LISINOPRIL 2.5 MILLIGRAM(S): 2.5 TABLET ORAL at 06:30

## 2020-02-09 RX ADMIN — Medication 3 MILLILITER(S): at 17:45

## 2020-02-09 RX ADMIN — RISPERIDONE 0.5 MILLIGRAM(S): 4 TABLET ORAL at 21:25

## 2020-02-09 RX ADMIN — Medication 1: at 17:46

## 2020-02-09 RX ADMIN — Medication 81 MILLIGRAM(S): at 12:33

## 2020-02-09 RX ADMIN — Medication 5 MILLIGRAM(S): at 21:27

## 2020-02-09 RX ADMIN — BUDESONIDE AND FORMOTEROL FUMARATE DIHYDRATE 2 PUFF(S): 160; 4.5 AEROSOL RESPIRATORY (INHALATION) at 18:03

## 2020-02-09 RX ADMIN — BUDESONIDE AND FORMOTEROL FUMARATE DIHYDRATE 2 PUFF(S): 160; 4.5 AEROSOL RESPIRATORY (INHALATION) at 06:30

## 2020-02-09 RX ADMIN — Medication 650 MILLIGRAM(S): at 14:15

## 2020-02-09 RX ADMIN — Medication 30 UNIT(S): at 08:09

## 2020-02-09 RX ADMIN — RISPERIDONE 0.5 MILLIGRAM(S): 4 TABLET ORAL at 13:36

## 2020-02-09 RX ADMIN — NYSTATIN CREAM 1 APPLICATION(S): 100000 CREAM TOPICAL at 21:28

## 2020-02-09 RX ADMIN — OXYCODONE AND ACETAMINOPHEN 2 TABLET(S): 5; 325 TABLET ORAL at 09:01

## 2020-02-09 RX ADMIN — SERTRALINE 50 MILLIGRAM(S): 25 TABLET, FILM COATED ORAL at 21:25

## 2020-02-09 RX ADMIN — ATORVASTATIN CALCIUM 40 MILLIGRAM(S): 80 TABLET, FILM COATED ORAL at 21:25

## 2020-02-09 RX ADMIN — OXYCODONE AND ACETAMINOPHEN 2 TABLET(S): 5; 325 TABLET ORAL at 08:06

## 2020-02-09 RX ADMIN — Medication 650 MILLIGRAM(S): at 13:36

## 2020-02-09 RX ADMIN — PANTOPRAZOLE SODIUM 40 MILLIGRAM(S): 20 TABLET, DELAYED RELEASE ORAL at 12:33

## 2020-02-09 RX ADMIN — Medication 30 UNIT(S): at 12:33

## 2020-02-09 NOTE — PROGRESS NOTE ADULT - PROBLEM SELECTOR PLAN 1
Will continue current insulin regimen for now. Will continue monitoring FS, log, will Follow up.  Suggest to DC on current insulin regimen and FU endo 4 weeks. Discussed with patient.  Patient counseled for compliance with consistent low carb diet, exercise as tolerated as out patient. Will continue current insulin regimen for now. Will continue monitoring FS, log, will Follow up.  Suggest to DC on current insulin regimen and FU endo 4 weeks. Discussed with patient.

## 2020-02-09 NOTE — PROGRESS NOTE ADULT - SUBJECTIVE AND OBJECTIVE BOX
Patient is a 59y old  Male who presents with a chief complaint of 59M w/ n/v/d and hyperglycemia sent in from [facility] (09 Feb 2020 14:08)    pt. seen and examined , no c/o   awaiting placement    INTERVAL HPI/OVERNIGHT EVENTS:  T(C): 36.7 (02-09-20 @ 15:55), Max: 36.7 (02-09-20 @ 09:05)  HR: 82 (02-09-20 @ 15:55) (76 - 88)  BP: 107/72 (02-09-20 @ 15:55) (105/71 - 113/75)  RR: 18 (02-09-20 @ 15:55) (18 - 19)  SpO2: 94% (02-09-20 @ 15:55) (94% - 98%)  Wt(kg): --  I&O's Summary    08 Feb 2020 07:01  -  09 Feb 2020 07:00  --------------------------------------------------------  IN: 780 mL / OUT: 0 mL / NET: 780 mL    09 Feb 2020 07:01  -  09 Feb 2020 18:57  --------------------------------------------------------  IN: 480 mL / OUT: 3 mL / NET: 477 mL        PAST MEDICAL & SURGICAL HISTORY:  Oxygen dependent  Gastroesophageal reflux disease, esophagitis presence not specified  Smoker  Bipolar 1 disorder  Coronary artery disease involving native coronary artery of native heart without angina pectoris  Type 2 diabetes mellitus without complication, with long-term current use of insulin  Pulmonary HTN  HLD (hyperlipidemia)  Stented coronary artery  COPD (chronic obstructive pulmonary disease)  No significant past surgical history      SOCIAL HISTORY  Alcohol:  Tobacco:  Illicit substance use:    FAMILY HISTORY:    REVIEW OF SYSTEMS:  CONSTITUTIONAL: No fever, weight loss, or fatigue  EYES: No eye pain, visual disturbances, or discharge  ENMT:  No difficulty hearing, tinnitus, vertigo; No sinus or throat pain  NECK: No pain or stiffness  RESPIRATORY: No cough, wheezing, chills or hemoptysis; No shortness of breath  CARDIOVASCULAR: No chest pain, palpitations, dizziness, or leg swelling  GASTROINTESTINAL: No abdominal or epigastric pain. No nausea, vomiting, or hematemesis; No diarrhea or constipation. No melena or hematochezia.  GENITOURINARY: No dysuria, frequency, hematuria, or incontinence  NEUROLOGICAL: No headaches, memory loss, loss of strength, numbness, or tremors  SKIN: No itching, burning, rashes, or lesions   LYMPH NODES: No enlarged glands  ENDOCRINE: No heat or cold intolerance; No hair loss  MUSCULOSKELETAL: No joint pain or swelling; No muscle, back, or extremity pain  PSYCHIATRIC: No depression, anxiety, mood swings, or difficulty sleeping  HEME/LYMPH: No easy bruising, or bleeding gums  ALLERY AND IMMUNOLOGIC: No hives or eczema    RADIOLOGY & ADDITIONAL TESTS:    Imaging Personally Reviewed:  [ ] YES  [ ] NO    Consultant(s) Notes Reviewed:  [ ] YES  [ ] NO    PHYSICAL EXAM:  GENERAL: NAD, well-groomed, well-developed  HEAD:  Atraumatic, Normocephalic  EYES: EOMI, PERRLA, conjunctiva and sclera clear  ENMT: No tonsillar erythema, exudates, or enlargement; Moist mucous membranes, Good dentition, No lesions  NECK: Supple, No JVD, Normal thyroid  NERVOUS SYSTEM:  Alert & Oriented X3, Good concentration; Motor Strength 5/5 B/L upper and lower extremities; DTRs 2+ intact and symmetric  CHEST/LUNG: Clear to percussion bilaterally; No rales, rhonchi, wheezing, or rubs  HEART: Regular rate and rhythm; No murmurs, rubs, or gallops  ABDOMEN: Soft, Nontender, Nondistended; Bowel sounds present  EXTREMITIES:  2+ Peripheral Pulses, No clubbing, cyanosis, or edema  LYMPH: No lymphadenopathy noted  SKIN: No rashes or lesions    LABS:              CAPILLARY BLOOD GLUCOSE      POCT Blood Glucose.: 197 mg/dL (09 Feb 2020 16:59)  POCT Blood Glucose.: 140 mg/dL (09 Feb 2020 12:02)  POCT Blood Glucose.: 171 mg/dL (09 Feb 2020 07:58)  POCT Blood Glucose.: 199 mg/dL (08 Feb 2020 22:07)            MEDICATIONS  (STANDING):  albuterol/ipratropium for Nebulization 3 milliLiter(s) Nebulizer every 6 hours  aspirin enteric coated 81 milliGRAM(s) Oral daily  atorvastatin 40 milliGRAM(s) Oral at bedtime  budesonide 160 MICROgram(s)/formoterol 4.5 MICROgram(s) Inhaler 2 Puff(s) Inhalation two times a day  dextrose 5%. 1000 milliLiter(s) (50 mL/Hr) IV Continuous <Continuous>  dextrose 50% Injectable 12.5 Gram(s) IV Push once  dextrose 50% Injectable 25 Gram(s) IV Push once  dextrose 50% Injectable 25 Gram(s) IV Push once  enoxaparin Injectable 40 milliGRAM(s) SubCutaneous daily  furosemide    Tablet 20 milliGRAM(s) Oral daily  insulin glargine Injectable (LANTUS) 75 Unit(s) SubCutaneous at bedtime  insulin lispro (HumaLOG) corrective regimen sliding scale   SubCutaneous three times a day before meals  insulin lispro (HumaLOG) corrective regimen sliding scale   SubCutaneous at bedtime  insulin lispro Injectable (HumaLOG) 30 Unit(s) SubCutaneous three times a day with meals  lisinopril 2.5 milliGRAM(s) Oral daily  melatonin 5 milliGRAM(s) Oral at bedtime  nicotine - Inhaler 1 Each Inhalation daily  nystatin Powder 1 Application(s) Topical two times a day  pantoprazole    Tablet 40 milliGRAM(s) Oral daily  polyethylene glycol 3350 17 Gram(s) Oral every 12 hours  risperiDONE   Tablet 0.5 milliGRAM(s) Oral three times a day  senna 2 Tablet(s) Oral at bedtime  sertraline 50 milliGRAM(s) Oral at bedtime    MEDICATIONS  (PRN):  acetaminophen   Tablet .. 650 milliGRAM(s) Oral every 6 hours PRN Mild Pain (1 - 3)  aluminum hydroxide/magnesium hydroxide/simethicone Suspension 30 milliLiter(s) Oral every 4 hours PRN Dyspepsia  bisacodyl 5 milliGRAM(s) Oral every 12 hours PRN Constipation  dextrose 40% Gel 15 Gram(s) Oral once PRN Blood Glucose LESS THAN 70 milliGRAM(s)/deciliter  glucagon  Injectable 1 milliGRAM(s) IntraMuscular once PRN Glucose LESS THAN 70 milligrams/deciliter  oxycodone    5 mG/acetaminophen 325 mG 2 Tablet(s) Oral every 6 hours PRN Moderate Pain (4 - 6)      Care Discussed with Consultants/Other Providers [ ] YES  [ ] NO

## 2020-02-09 NOTE — PROGRESS NOTE ADULT - ASSESSMENT
Assessment  DMT2: 59y Male with DM T2 with hyperglycemia, A1C 10.4%, on basal bolus insulin, blood sugars much improved, FS trending within overall acceptable range, no hypoglycemic episodes. Patient is alert, comfortable, sitting up in chair waiting for lunch, states they're "waiting on call" from the shelter. Still awaiting placement.  CAD: on medications, no chest pain, stable, monitored.  HTN: Controlled,  on antihypertensive medications.  Overweight/Obesity: No strict exercise routines, not on any weight loss program, neither on low  calorie diet.          Laury Romero MD  Cell: [phone]  Office: [phone] Assessment  DMT2: 59y Male with DM T2 with hyperglycemia, A1C 10.4%, on basal bolus insulin, blood sugars much improved, FS trending within overall acceptable range, no hypoglycemic episodes. Patient is alert, comfortable, sitting up in chair waiting for lunch,  states they're "waiting on call" from the shelter. Still awaiting placement.  CAD: on medications, no chest pain, stable, monitored.  HTN: Controlled,  on antihypertensive medications.  Overweight/Obesity: No strict exercise routines, not on any weight loss program, neither on low  calorie diet.          Laury Romero MD  Cell: [phone]  Office: [phone]

## 2020-02-09 NOTE — PROGRESS NOTE ADULT - SUBJECTIVE AND OBJECTIVE BOX
Chief complaint  Patient is a 59y old  Male who presents with a chief complaint of 59M w/ n/v/d and hyperglycemia sent in from [facility] (08 Feb 2020 18:40)   Review of systems  Patient in bed, looks comfortable, no hypoglycemia.    Labs and Fingersticks  CAPILLARY BLOOD GLUCOSE      POCT Blood Glucose.: 140 mg/dL (09 Feb 2020 12:02)  POCT Blood Glucose.: 171 mg/dL (09 Feb 2020 07:58)  POCT Blood Glucose.: 199 mg/dL (08 Feb 2020 22:07)  POCT Blood Glucose.: 145 mg/dL (08 Feb 2020 17:26)                        Medications  MEDICATIONS  (STANDING):  albuterol/ipratropium for Nebulization 3 milliLiter(s) Nebulizer every 6 hours  aspirin enteric coated 81 milliGRAM(s) Oral daily  atorvastatin 40 milliGRAM(s) Oral at bedtime  budesonide 160 MICROgram(s)/formoterol 4.5 MICROgram(s) Inhaler 2 Puff(s) Inhalation two times a day  dextrose 5%. 1000 milliLiter(s) (50 mL/Hr) IV Continuous <Continuous>  dextrose 50% Injectable 12.5 Gram(s) IV Push once  dextrose 50% Injectable 25 Gram(s) IV Push once  dextrose 50% Injectable 25 Gram(s) IV Push once  enoxaparin Injectable 40 milliGRAM(s) SubCutaneous daily  furosemide    Tablet 20 milliGRAM(s) Oral daily  insulin glargine Injectable (LANTUS) 75 Unit(s) SubCutaneous at bedtime  insulin lispro (HumaLOG) corrective regimen sliding scale   SubCutaneous three times a day before meals  insulin lispro (HumaLOG) corrective regimen sliding scale   SubCutaneous at bedtime  insulin lispro Injectable (HumaLOG) 30 Unit(s) SubCutaneous three times a day with meals  lisinopril 2.5 milliGRAM(s) Oral daily  melatonin 5 milliGRAM(s) Oral at bedtime  nicotine - Inhaler 1 Each Inhalation daily  nystatin Powder 1 Application(s) Topical two times a day  pantoprazole    Tablet 40 milliGRAM(s) Oral daily  polyethylene glycol 3350 17 Gram(s) Oral every 12 hours  risperiDONE   Tablet 0.5 milliGRAM(s) Oral three times a day  senna 2 Tablet(s) Oral at bedtime  sertraline 50 milliGRAM(s) Oral at bedtime      Physical Exam  General: Patient comfortable in bed  Vital Signs Last 12 Hrs  T(F): 97.3 (02-09-20 @ 13:39), Max: 98.1 (02-09-20 @ 09:05)  HR: 88 (02-09-20 @ 12:36) (76 - 88)  BP: 111/75 (02-09-20 @ 12:36) (105/71 - 111/75)  BP(mean): --  RR: 18 (02-09-20 @ 12:36) (18 - 19)  SpO2: 98% (02-09-20 @ 12:36) (95% - 98%)  Neck: No palpable thyroid nodules.  CVS: S1S2, No murmurs  Respiratory: No wheezing, no crepitations  GI: Abdomen soft, bowel sounds positive  Musculoskeletal:  edema lower extremities.   Skin: No skin rashes, no ecchymosis    Diagnostics Chief complaint  Patient is a 59y old  Male who presents with a chief complaint of 59M w/ n/v/d and hyperglycemia sent in from [facility] (08 Feb 2020 18:40)   Review of systems  Patient in bed, looks comfortable,  no hypoglycemia.    Labs and Fingersticks  CAPILLARY BLOOD GLUCOSE      POCT Blood Glucose.: 140 mg/dL (09 Feb 2020 12:02)  POCT Blood Glucose.: 171 mg/dL (09 Feb 2020 07:58)  POCT Blood Glucose.: 199 mg/dL (08 Feb 2020 22:07)  POCT Blood Glucose.: 145 mg/dL (08 Feb 2020 17:26)                        Medications  MEDICATIONS  (STANDING):  albuterol/ipratropium for Nebulization 3 milliLiter(s) Nebulizer every 6 hours  aspirin enteric coated 81 milliGRAM(s) Oral daily  atorvastatin 40 milliGRAM(s) Oral at bedtime  budesonide 160 MICROgram(s)/formoterol 4.5 MICROgram(s) Inhaler 2 Puff(s) Inhalation two times a day  dextrose 5%. 1000 milliLiter(s) (50 mL/Hr) IV Continuous <Continuous>  dextrose 50% Injectable 12.5 Gram(s) IV Push once  dextrose 50% Injectable 25 Gram(s) IV Push once  dextrose 50% Injectable 25 Gram(s) IV Push once  enoxaparin Injectable 40 milliGRAM(s) SubCutaneous daily  furosemide    Tablet 20 milliGRAM(s) Oral daily  insulin glargine Injectable (LANTUS) 75 Unit(s) SubCutaneous at bedtime  insulin lispro (HumaLOG) corrective regimen sliding scale   SubCutaneous three times a day before meals  insulin lispro (HumaLOG) corrective regimen sliding scale   SubCutaneous at bedtime  insulin lispro Injectable (HumaLOG) 30 Unit(s) SubCutaneous three times a day with meals  lisinopril 2.5 milliGRAM(s) Oral daily  melatonin 5 milliGRAM(s) Oral at bedtime  nicotine - Inhaler 1 Each Inhalation daily  nystatin Powder 1 Application(s) Topical two times a day  pantoprazole    Tablet 40 milliGRAM(s) Oral daily  polyethylene glycol 3350 17 Gram(s) Oral every 12 hours  risperiDONE   Tablet 0.5 milliGRAM(s) Oral three times a day  senna 2 Tablet(s) Oral at bedtime  sertraline 50 milliGRAM(s) Oral at bedtime      Physical Exam  General: Patient comfortable in bed  Vital Signs Last 12 Hrs  T(F): 97.3 (02-09-20 @ 13:39), Max: 98.1 (02-09-20 @ 09:05)  HR: 88 (02-09-20 @ 12:36) (76 - 88)  BP: 111/75 (02-09-20 @ 12:36) (105/71 - 111/75)  BP(mean): --  RR: 18 (02-09-20 @ 12:36) (18 - 19)  SpO2: 98% (02-09-20 @ 12:36) (95% - 98%)  Neck: No palpable thyroid nodules.  CVS: S1S2, No murmurs  Respiratory: No wheezing, no crepitations  GI: Abdomen soft, bowel sounds positive  Musculoskeletal:  edema lower extremities.   Skin: No skin rashes, no ecchymosis    Diagnostics

## 2020-02-10 LAB
GLUCOSE BLDC GLUCOMTR-MCNC: 179 MG/DL — HIGH (ref 70–99)
GLUCOSE BLDC GLUCOMTR-MCNC: 214 MG/DL — HIGH (ref 70–99)
GLUCOSE BLDC GLUCOMTR-MCNC: 239 MG/DL — HIGH (ref 70–99)
GLUCOSE BLDC GLUCOMTR-MCNC: 278 MG/DL — HIGH (ref 70–99)

## 2020-02-10 RX ADMIN — Medication 2: at 12:45

## 2020-02-10 RX ADMIN — Medication 30 UNIT(S): at 12:44

## 2020-02-10 RX ADMIN — SERTRALINE 50 MILLIGRAM(S): 25 TABLET, FILM COATED ORAL at 21:15

## 2020-02-10 RX ADMIN — ENOXAPARIN SODIUM 40 MILLIGRAM(S): 100 INJECTION SUBCUTANEOUS at 12:44

## 2020-02-10 RX ADMIN — Medication 3 MILLILITER(S): at 23:09

## 2020-02-10 RX ADMIN — Medication 1: at 22:13

## 2020-02-10 RX ADMIN — Medication 3 MILLILITER(S): at 17:08

## 2020-02-10 RX ADMIN — Medication 30 UNIT(S): at 08:27

## 2020-02-10 RX ADMIN — BUDESONIDE AND FORMOTEROL FUMARATE DIHYDRATE 2 PUFF(S): 160; 4.5 AEROSOL RESPIRATORY (INHALATION) at 17:08

## 2020-02-10 RX ADMIN — OXYCODONE AND ACETAMINOPHEN 2 TABLET(S): 5; 325 TABLET ORAL at 13:28

## 2020-02-10 RX ADMIN — BUDESONIDE AND FORMOTEROL FUMARATE DIHYDRATE 2 PUFF(S): 160; 4.5 AEROSOL RESPIRATORY (INHALATION) at 06:39

## 2020-02-10 RX ADMIN — Medication 1: at 17:39

## 2020-02-10 RX ADMIN — OXYCODONE AND ACETAMINOPHEN 2 TABLET(S): 5; 325 TABLET ORAL at 07:25

## 2020-02-10 RX ADMIN — OXYCODONE AND ACETAMINOPHEN 2 TABLET(S): 5; 325 TABLET ORAL at 20:08

## 2020-02-10 RX ADMIN — Medication 2: at 08:27

## 2020-02-10 RX ADMIN — RISPERIDONE 0.5 MILLIGRAM(S): 4 TABLET ORAL at 21:14

## 2020-02-10 RX ADMIN — Medication 81 MILLIGRAM(S): at 12:44

## 2020-02-10 RX ADMIN — Medication 3 MILLILITER(S): at 00:07

## 2020-02-10 RX ADMIN — Medication 5 MILLIGRAM(S): at 21:14

## 2020-02-10 RX ADMIN — PANTOPRAZOLE SODIUM 40 MILLIGRAM(S): 20 TABLET, DELAYED RELEASE ORAL at 12:44

## 2020-02-10 RX ADMIN — RISPERIDONE 0.5 MILLIGRAM(S): 4 TABLET ORAL at 13:16

## 2020-02-10 RX ADMIN — INSULIN GLARGINE 75 UNIT(S): 100 INJECTION, SOLUTION SUBCUTANEOUS at 22:11

## 2020-02-10 RX ADMIN — LISINOPRIL 2.5 MILLIGRAM(S): 2.5 TABLET ORAL at 08:27

## 2020-02-10 RX ADMIN — ATORVASTATIN CALCIUM 40 MILLIGRAM(S): 80 TABLET, FILM COATED ORAL at 21:15

## 2020-02-10 RX ADMIN — OXYCODONE AND ACETAMINOPHEN 2 TABLET(S): 5; 325 TABLET ORAL at 00:04

## 2020-02-10 RX ADMIN — RISPERIDONE 0.5 MILLIGRAM(S): 4 TABLET ORAL at 06:38

## 2020-02-10 RX ADMIN — Medication 3 MILLILITER(S): at 12:44

## 2020-02-10 RX ADMIN — OXYCODONE AND ACETAMINOPHEN 2 TABLET(S): 5; 325 TABLET ORAL at 01:04

## 2020-02-10 RX ADMIN — Medication 3 MILLILITER(S): at 06:38

## 2020-02-10 RX ADMIN — NYSTATIN CREAM 1 APPLICATION(S): 100000 CREAM TOPICAL at 06:39

## 2020-02-10 RX ADMIN — OXYCODONE AND ACETAMINOPHEN 2 TABLET(S): 5; 325 TABLET ORAL at 21:08

## 2020-02-10 RX ADMIN — OXYCODONE AND ACETAMINOPHEN 2 TABLET(S): 5; 325 TABLET ORAL at 06:37

## 2020-02-10 RX ADMIN — Medication 30 UNIT(S): at 17:39

## 2020-02-10 RX ADMIN — NYSTATIN CREAM 1 APPLICATION(S): 100000 CREAM TOPICAL at 21:15

## 2020-02-10 RX ADMIN — OXYCODONE AND ACETAMINOPHEN 2 TABLET(S): 5; 325 TABLET ORAL at 12:51

## 2020-02-10 NOTE — PROGRESS NOTE ADULT - ASSESSMENT
Assessment  DMT2: 59y Male with DM T2 with hyperglycemia, A1C 10.4%, on basal bolus insulin, blood sugars fluctuating, FS in overall acceptable range, no hypoglycemic episodes. Patient is alert, comfortable, awaiting placement to shelter.  CAD: on medications, no chest pain, stable, monitored.  HTN: Controlled,  on antihypertensive medications.  Overweight/Obesity: No strict exercise routines, not on any weight loss program, neither on low  calorie diet.          Laury Romero MD  Cell: [phone]  Office: [phone] Assessment  DMT2: 59y Male with DM T2 with hyperglycemia, A1C 10.4%, on basal bolus insulin, blood sugars fluctuating, FS in overall acceptable range, no hypoglycemic episodes. Patient is alert, comfortable,  awaiting placement to shelter.  CAD: on medications, no chest pain, stable, monitored.  HTN: Controlled,  on antihypertensive medications.  Overweight/Obesity: No strict exercise routines, not on any weight loss program, neither on low  calorie diet.          Laury Romero MD  Cell: [phone]  Office: [phone]

## 2020-02-10 NOTE — PROGRESS NOTE ADULT - SUBJECTIVE AND OBJECTIVE BOX
Chief complaint  Patient is a 59y old  Male who presents with a chief complaint of 59M w/ n/v/d and hyperglycemia sent in from [facility] (09 Feb 2020 18:56)   Review of systems  Patient in bed, looks comfortable, no hypoglycemia.    Labs and Fingersticks  CAPILLARY BLOOD GLUCOSE      POCT Blood Glucose.: 214 mg/dL (10 Feb 2020 12:10)  POCT Blood Glucose.: 239 mg/dL (10 Feb 2020 08:13)  POCT Blood Glucose.: 197 mg/dL (09 Feb 2020 21:49)  POCT Blood Glucose.: 197 mg/dL (09 Feb 2020 16:59)                        Medications  MEDICATIONS  (STANDING):  albuterol/ipratropium for Nebulization 3 milliLiter(s) Nebulizer every 6 hours  aspirin enteric coated 81 milliGRAM(s) Oral daily  atorvastatin 40 milliGRAM(s) Oral at bedtime  budesonide 160 MICROgram(s)/formoterol 4.5 MICROgram(s) Inhaler 2 Puff(s) Inhalation two times a day  dextrose 5%. 1000 milliLiter(s) (50 mL/Hr) IV Continuous <Continuous>  dextrose 50% Injectable 12.5 Gram(s) IV Push once  dextrose 50% Injectable 25 Gram(s) IV Push once  dextrose 50% Injectable 25 Gram(s) IV Push once  enoxaparin Injectable 40 milliGRAM(s) SubCutaneous daily  furosemide    Tablet 20 milliGRAM(s) Oral daily  insulin glargine Injectable (LANTUS) 75 Unit(s) SubCutaneous at bedtime  insulin lispro (HumaLOG) corrective regimen sliding scale   SubCutaneous three times a day before meals  insulin lispro (HumaLOG) corrective regimen sliding scale   SubCutaneous at bedtime  insulin lispro Injectable (HumaLOG) 30 Unit(s) SubCutaneous three times a day with meals  lisinopril 2.5 milliGRAM(s) Oral daily  melatonin 5 milliGRAM(s) Oral at bedtime  nicotine - Inhaler 1 Each Inhalation daily  nystatin Powder 1 Application(s) Topical two times a day  pantoprazole    Tablet 40 milliGRAM(s) Oral daily  polyethylene glycol 3350 17 Gram(s) Oral every 12 hours  risperiDONE   Tablet 0.5 milliGRAM(s) Oral three times a day  senna 2 Tablet(s) Oral at bedtime  sertraline 50 milliGRAM(s) Oral at bedtime      Physical Exam  General: Patient comfortable in bed  Vital Signs Last 12 Hrs  T(F): 98.6 (02-10-20 @ 12:40), Max: 98.6 (02-10-20 @ 12:40)  HR: 74 (02-10-20 @ 12:40) (71 - 80)  BP: 99/70 (02-10-20 @ 12:40) (99/65 - 106/74)  BP(mean): --  RR: 18 (02-10-20 @ 12:40) (18 - 19)  SpO2: 95% (02-10-20 @ 12:40) (95% - 96%)  Neck: No palpable thyroid nodules.  CVS: S1S2, No murmurs  Respiratory: No wheezing, no crepitations  GI: Abdomen soft, bowel sounds positive  Musculoskeletal:  edema lower extremities.   Skin: No skin rashes, no ecchymosis    Diagnostics Chief complaint  Patient is a 59y old  Male who presents with a chief complaint of 59M w/ n/v/d and hyperglycemia sent in from [facility] (09 Feb 2020 18:56)   Review of systems  Patient in bed, looks comfortable,  no hypoglycemia.    Labs and Fingersticks  CAPILLARY BLOOD GLUCOSE      POCT Blood Glucose.: 214 mg/dL (10 Feb 2020 12:10)  POCT Blood Glucose.: 239 mg/dL (10 Feb 2020 08:13)  POCT Blood Glucose.: 197 mg/dL (09 Feb 2020 21:49)  POCT Blood Glucose.: 197 mg/dL (09 Feb 2020 16:59)                        Medications  MEDICATIONS  (STANDING):  albuterol/ipratropium for Nebulization 3 milliLiter(s) Nebulizer every 6 hours  aspirin enteric coated 81 milliGRAM(s) Oral daily  atorvastatin 40 milliGRAM(s) Oral at bedtime  budesonide 160 MICROgram(s)/formoterol 4.5 MICROgram(s) Inhaler 2 Puff(s) Inhalation two times a day  dextrose 5%. 1000 milliLiter(s) (50 mL/Hr) IV Continuous <Continuous>  dextrose 50% Injectable 12.5 Gram(s) IV Push once  dextrose 50% Injectable 25 Gram(s) IV Push once  dextrose 50% Injectable 25 Gram(s) IV Push once  enoxaparin Injectable 40 milliGRAM(s) SubCutaneous daily  furosemide    Tablet 20 milliGRAM(s) Oral daily  insulin glargine Injectable (LANTUS) 75 Unit(s) SubCutaneous at bedtime  insulin lispro (HumaLOG) corrective regimen sliding scale   SubCutaneous three times a day before meals  insulin lispro (HumaLOG) corrective regimen sliding scale   SubCutaneous at bedtime  insulin lispro Injectable (HumaLOG) 30 Unit(s) SubCutaneous three times a day with meals  lisinopril 2.5 milliGRAM(s) Oral daily  melatonin 5 milliGRAM(s) Oral at bedtime  nicotine - Inhaler 1 Each Inhalation daily  nystatin Powder 1 Application(s) Topical two times a day  pantoprazole    Tablet 40 milliGRAM(s) Oral daily  polyethylene glycol 3350 17 Gram(s) Oral every 12 hours  risperiDONE   Tablet 0.5 milliGRAM(s) Oral three times a day  senna 2 Tablet(s) Oral at bedtime  sertraline 50 milliGRAM(s) Oral at bedtime      Physical Exam  General: Patient comfortable in bed  Vital Signs Last 12 Hrs  T(F): 98.6 (02-10-20 @ 12:40), Max: 98.6 (02-10-20 @ 12:40)  HR: 74 (02-10-20 @ 12:40) (71 - 80)  BP: 99/70 (02-10-20 @ 12:40) (99/65 - 106/74)  BP(mean): --  RR: 18 (02-10-20 @ 12:40) (18 - 19)  SpO2: 95% (02-10-20 @ 12:40) (95% - 96%)  Neck: No palpable thyroid nodules.  CVS: S1S2, No murmurs  Respiratory: No wheezing, no crepitations  GI: Abdomen soft, bowel sounds positive  Musculoskeletal:  edema lower extremities.   Skin: No skin rashes, no ecchymosis    Diagnostics

## 2020-02-10 NOTE — PROGRESS NOTE ADULT - SUBJECTIVE AND OBJECTIVE BOX
Patient is a 59y old  Male who presents with a chief complaint of 59M w/ n/v/d and hyperglycemia sent in from [facility] (10 Feb 2020 14:57)      INTERVAL HPI/OVERNIGHT EVENTS:  T(C): 36.4 (02-11-20 @ 00:31), Max: 37 (02-10-20 @ 12:40)  HR: 72 (02-11-20 @ 00:31) (71 - 91)  BP: 115/76 (02-11-20 @ 00:31) (99/65 - 123/76)  RR: 18 (02-11-20 @ 00:31) (18 - 20)  SpO2: 97% (02-11-20 @ 00:31) (95% - 97%)  Wt(kg): --  I&O's Summary    09 Feb 2020 07:01  -  10 Feb 2020 07:00  --------------------------------------------------------  IN: 980 mL / OUT: 3 mL / NET: 977 mL    10 Feb 2020 07:01  -  11 Feb 2020 02:06  --------------------------------------------------------  IN: 1200 mL / OUT: 0 mL / NET: 1200 mL        PAST MEDICAL & SURGICAL HISTORY:  Oxygen dependent  Gastroesophageal reflux disease, esophagitis presence not specified  Smoker  Bipolar 1 disorder  Coronary artery disease involving native coronary artery of native heart without angina pectoris  Type 2 diabetes mellitus without complication, with long-term current use of insulin  Pulmonary HTN  HLD (hyperlipidemia)  Stented coronary artery  COPD (chronic obstructive pulmonary disease)  No significant past surgical history      SOCIAL HISTORY  Alcohol:  Tobacco:  Illicit substance use:    FAMILY HISTORY:    REVIEW OF SYSTEMS:  CONSTITUTIONAL: No fever, weight loss, or fatigue  EYES: No eye pain, visual disturbances, or discharge  ENMT:  No difficulty hearing, tinnitus, vertigo; No sinus or throat pain  NECK: No pain or stiffness  RESPIRATORY: No cough, wheezing, chills or hemoptysis; No shortness of breath  CARDIOVASCULAR: No chest pain, palpitations, dizziness, or leg swelling  GASTROINTESTINAL: No abdominal or epigastric pain. No nausea, vomiting, or hematemesis; No diarrhea or constipation. No melena or hematochezia.  GENITOURINARY: No dysuria, frequency, hematuria, or incontinence  NEUROLOGICAL: No headaches, memory loss, loss of strength, numbness, or tremors  SKIN: No itching, burning, rashes, or lesions   LYMPH NODES: No enlarged glands  ENDOCRINE: No heat or cold intolerance; No hair loss  MUSCULOSKELETAL: No joint pain or swelling; No muscle, back, or extremity pain  PSYCHIATRIC: No depression, anxiety, mood swings, or difficulty sleeping  HEME/LYMPH: No easy bruising, or bleeding gums  ALLERY AND IMMUNOLOGIC: No hives or eczema    RADIOLOGY & ADDITIONAL TESTS:    Imaging Personally Reviewed:  [ ] YES  [ ] NO    Consultant(s) Notes Reviewed:  [ ] YES  [ ] NO    PHYSICAL EXAM:  GENERAL: NAD, well-groomed, well-developed  HEAD:  Atraumatic, Normocephalic  EYES: EOMI, PERRLA, conjunctiva and sclera clear  ENMT: No tonsillar erythema, exudates, or enlargement; Moist mucous membranes, Good dentition, No lesions  NECK: Supple, No JVD, Normal thyroid  NERVOUS SYSTEM:  Alert & Oriented X3, Good concentration; Motor Strength 5/5 B/L upper and lower extremities; DTRs 2+ intact and symmetric  CHEST/LUNG: Clear to percussion bilaterally; No rales, rhonchi, wheezing, or rubs  HEART: Regular rate and rhythm; No murmurs, rubs, or gallops  ABDOMEN: Soft, Nontender, Nondistended; Bowel sounds present  EXTREMITIES:  2+ Peripheral Pulses, No clubbing, cyanosis, or edema  LYMPH: No lymphadenopathy noted  SKIN: No rashes or lesions    LABS:              CAPILLARY BLOOD GLUCOSE      POCT Blood Glucose.: 278 mg/dL (10 Feb 2020 21:40)  POCT Blood Glucose.: 179 mg/dL (10 Feb 2020 17:26)  POCT Blood Glucose.: 214 mg/dL (10 Feb 2020 12:10)  POCT Blood Glucose.: 239 mg/dL (10 Feb 2020 08:13)            MEDICATIONS  (STANDING):  albuterol/ipratropium for Nebulization 3 milliLiter(s) Nebulizer every 6 hours  aspirin enteric coated 81 milliGRAM(s) Oral daily  atorvastatin 40 milliGRAM(s) Oral at bedtime  budesonide 160 MICROgram(s)/formoterol 4.5 MICROgram(s) Inhaler 2 Puff(s) Inhalation two times a day  dextrose 5%. 1000 milliLiter(s) (50 mL/Hr) IV Continuous <Continuous>  dextrose 50% Injectable 12.5 Gram(s) IV Push once  dextrose 50% Injectable 25 Gram(s) IV Push once  dextrose 50% Injectable 25 Gram(s) IV Push once  enoxaparin Injectable 40 milliGRAM(s) SubCutaneous daily  furosemide    Tablet 20 milliGRAM(s) Oral daily  insulin glargine Injectable (LANTUS) 75 Unit(s) SubCutaneous at bedtime  insulin lispro (HumaLOG) corrective regimen sliding scale   SubCutaneous three times a day before meals  insulin lispro (HumaLOG) corrective regimen sliding scale   SubCutaneous at bedtime  insulin lispro Injectable (HumaLOG) 30 Unit(s) SubCutaneous three times a day with meals  lisinopril 2.5 milliGRAM(s) Oral daily  melatonin 5 milliGRAM(s) Oral at bedtime  nicotine - Inhaler 1 Each Inhalation daily  nystatin Powder 1 Application(s) Topical two times a day  pantoprazole    Tablet 40 milliGRAM(s) Oral daily  polyethylene glycol 3350 17 Gram(s) Oral every 12 hours  risperiDONE   Tablet 0.5 milliGRAM(s) Oral three times a day  senna 2 Tablet(s) Oral at bedtime  sertraline 50 milliGRAM(s) Oral at bedtime    MEDICATIONS  (PRN):  acetaminophen   Tablet .. 650 milliGRAM(s) Oral every 6 hours PRN Mild Pain (1 - 3)  aluminum hydroxide/magnesium hydroxide/simethicone Suspension 30 milliLiter(s) Oral every 4 hours PRN Dyspepsia  bisacodyl 5 milliGRAM(s) Oral every 12 hours PRN Constipation  dextrose 40% Gel 15 Gram(s) Oral once PRN Blood Glucose LESS THAN 70 milliGRAM(s)/deciliter  glucagon  Injectable 1 milliGRAM(s) IntraMuscular once PRN Glucose LESS THAN 70 milligrams/deciliter  oxycodone    5 mG/acetaminophen 325 mG 2 Tablet(s) Oral every 6 hours PRN Moderate Pain (4 - 6)      Care Discussed with Consultants/Other Providers [ ] YES  [ ] NO

## 2020-02-10 NOTE — PROGRESS NOTE ADULT - PROBLEM SELECTOR PLAN 1
Will continue current insulin regimen for now. Will continue monitoring FS, log, will Follow up.  Suggest to DC on current insulin regimen and FU endo 4 weeks. Discussed with patient.  Patient counseled for compliance with consistent low carb diet and exercise as tolerated outpatient. Will continue current insulin regimen for now. Will continue monitoring FS, log, will Follow up.  Suggest to DC on current insulin regimen and FU endo 4 weeks. Discussed with patient.

## 2020-02-11 LAB
ANION GAP SERPL CALC-SCNC: 10 MMOL/L — SIGNIFICANT CHANGE UP (ref 5–17)
BUN SERPL-MCNC: 15 MG/DL — SIGNIFICANT CHANGE UP (ref 7–23)
CALCIUM SERPL-MCNC: 9.2 MG/DL — SIGNIFICANT CHANGE UP (ref 8.4–10.5)
CHLORIDE SERPL-SCNC: 97 MMOL/L — SIGNIFICANT CHANGE UP (ref 96–108)
CO2 SERPL-SCNC: 28 MMOL/L — SIGNIFICANT CHANGE UP (ref 22–31)
CREAT SERPL-MCNC: 0.62 MG/DL — SIGNIFICANT CHANGE UP (ref 0.5–1.3)
GLUCOSE BLDC GLUCOMTR-MCNC: 143 MG/DL — HIGH (ref 70–99)
GLUCOSE BLDC GLUCOMTR-MCNC: 167 MG/DL — HIGH (ref 70–99)
GLUCOSE BLDC GLUCOMTR-MCNC: 219 MG/DL — HIGH (ref 70–99)
GLUCOSE BLDC GLUCOMTR-MCNC: 282 MG/DL — HIGH (ref 70–99)
GLUCOSE SERPL-MCNC: 222 MG/DL — HIGH (ref 70–99)
HCT VFR BLD CALC: 37.2 % — LOW (ref 39–50)
HGB BLD-MCNC: 11.9 G/DL — LOW (ref 13–17)
MCHC RBC-ENTMCNC: 28.5 PG — SIGNIFICANT CHANGE UP (ref 27–34)
MCHC RBC-ENTMCNC: 32 GM/DL — SIGNIFICANT CHANGE UP (ref 32–36)
MCV RBC AUTO: 89.2 FL — SIGNIFICANT CHANGE UP (ref 80–100)
NRBC # BLD: 0 /100 WBCS — SIGNIFICANT CHANGE UP (ref 0–0)
PLATELET # BLD AUTO: 216 K/UL — SIGNIFICANT CHANGE UP (ref 150–400)
POTASSIUM SERPL-MCNC: 4.4 MMOL/L — SIGNIFICANT CHANGE UP (ref 3.5–5.3)
POTASSIUM SERPL-SCNC: 4.4 MMOL/L — SIGNIFICANT CHANGE UP (ref 3.5–5.3)
RBC # BLD: 4.17 M/UL — LOW (ref 4.2–5.8)
RBC # FLD: 12.5 % — SIGNIFICANT CHANGE UP (ref 10.3–14.5)
SODIUM SERPL-SCNC: 135 MMOL/L — SIGNIFICANT CHANGE UP (ref 135–145)
WBC # BLD: 9.6 K/UL — SIGNIFICANT CHANGE UP (ref 3.8–10.5)
WBC # FLD AUTO: 9.6 K/UL — SIGNIFICANT CHANGE UP (ref 3.8–10.5)

## 2020-02-11 RX ORDER — OXYCODONE AND ACETAMINOPHEN 5; 325 MG/1; MG/1
2 TABLET ORAL EVERY 6 HOURS
Refills: 0 | Status: DISCONTINUED | OUTPATIENT
Start: 2020-02-11 | End: 2020-02-18

## 2020-02-11 RX ORDER — NICOTINE POLACRILEX 2 MG
1 GUM BUCCAL DAILY
Refills: 0 | Status: DISCONTINUED | OUTPATIENT
Start: 2020-02-11 | End: 2020-03-13

## 2020-02-11 RX ADMIN — LISINOPRIL 2.5 MILLIGRAM(S): 2.5 TABLET ORAL at 06:50

## 2020-02-11 RX ADMIN — Medication 20 MILLIGRAM(S): at 06:29

## 2020-02-11 RX ADMIN — Medication 30 UNIT(S): at 12:45

## 2020-02-11 RX ADMIN — Medication 1: at 12:45

## 2020-02-11 RX ADMIN — Medication 1: at 22:24

## 2020-02-11 RX ADMIN — OXYCODONE AND ACETAMINOPHEN 2 TABLET(S): 5; 325 TABLET ORAL at 19:42

## 2020-02-11 RX ADMIN — SERTRALINE 50 MILLIGRAM(S): 25 TABLET, FILM COATED ORAL at 21:06

## 2020-02-11 RX ADMIN — Medication 81 MILLIGRAM(S): at 12:44

## 2020-02-11 RX ADMIN — OXYCODONE AND ACETAMINOPHEN 2 TABLET(S): 5; 325 TABLET ORAL at 14:19

## 2020-02-11 RX ADMIN — OXYCODONE AND ACETAMINOPHEN 2 TABLET(S): 5; 325 TABLET ORAL at 20:49

## 2020-02-11 RX ADMIN — Medication 3 MILLILITER(S): at 17:24

## 2020-02-11 RX ADMIN — RISPERIDONE 0.5 MILLIGRAM(S): 4 TABLET ORAL at 12:45

## 2020-02-11 RX ADMIN — Medication 650 MILLIGRAM(S): at 18:05

## 2020-02-11 RX ADMIN — NYSTATIN CREAM 1 APPLICATION(S): 100000 CREAM TOPICAL at 21:06

## 2020-02-11 RX ADMIN — RISPERIDONE 0.5 MILLIGRAM(S): 4 TABLET ORAL at 21:06

## 2020-02-11 RX ADMIN — OXYCODONE AND ACETAMINOPHEN 2 TABLET(S): 5; 325 TABLET ORAL at 12:51

## 2020-02-11 RX ADMIN — OXYCODONE AND ACETAMINOPHEN 2 TABLET(S): 5; 325 TABLET ORAL at 07:08

## 2020-02-11 RX ADMIN — Medication 30 UNIT(S): at 09:13

## 2020-02-11 RX ADMIN — NYSTATIN CREAM 1 APPLICATION(S): 100000 CREAM TOPICAL at 06:31

## 2020-02-11 RX ADMIN — Medication 3 MILLILITER(S): at 23:54

## 2020-02-11 RX ADMIN — ENOXAPARIN SODIUM 40 MILLIGRAM(S): 100 INJECTION SUBCUTANEOUS at 12:44

## 2020-02-11 RX ADMIN — SENNA PLUS 2 TABLET(S): 8.6 TABLET ORAL at 21:06

## 2020-02-11 RX ADMIN — Medication 3 MILLILITER(S): at 12:44

## 2020-02-11 RX ADMIN — BUDESONIDE AND FORMOTEROL FUMARATE DIHYDRATE 2 PUFF(S): 160; 4.5 AEROSOL RESPIRATORY (INHALATION) at 06:31

## 2020-02-11 RX ADMIN — RISPERIDONE 0.5 MILLIGRAM(S): 4 TABLET ORAL at 06:29

## 2020-02-11 RX ADMIN — ATORVASTATIN CALCIUM 40 MILLIGRAM(S): 80 TABLET, FILM COATED ORAL at 21:06

## 2020-02-11 RX ADMIN — OXYCODONE AND ACETAMINOPHEN 2 TABLET(S): 5; 325 TABLET ORAL at 06:28

## 2020-02-11 RX ADMIN — Medication 30 UNIT(S): at 17:24

## 2020-02-11 RX ADMIN — Medication 650 MILLIGRAM(S): at 17:25

## 2020-02-11 RX ADMIN — PANTOPRAZOLE SODIUM 40 MILLIGRAM(S): 20 TABLET, DELAYED RELEASE ORAL at 06:50

## 2020-02-11 RX ADMIN — Medication 5 MILLIGRAM(S): at 21:06

## 2020-02-11 RX ADMIN — BUDESONIDE AND FORMOTEROL FUMARATE DIHYDRATE 2 PUFF(S): 160; 4.5 AEROSOL RESPIRATORY (INHALATION) at 17:23

## 2020-02-11 RX ADMIN — Medication 3 MILLILITER(S): at 06:31

## 2020-02-11 RX ADMIN — INSULIN GLARGINE 75 UNIT(S): 100 INJECTION, SOLUTION SUBCUTANEOUS at 22:23

## 2020-02-11 RX ADMIN — Medication 2: at 09:13

## 2020-02-11 NOTE — PROGRESS NOTE ADULT - SUBJECTIVE AND OBJECTIVE BOX
Patient is a 59y old  Male who presents with a chief complaint of 59M w/ n/v/d and hyperglycemia sent in from [facility] (11 Feb 2020 14:20)      INTERVAL HPI/OVERNIGHT EVENTS:  T(C): 36.2 (02-11-20 @ 17:17), Max: 36.7 (02-11-20 @ 06:20)  HR: 76 (02-11-20 @ 17:17) (72 - 91)  BP: 100/65 (02-11-20 @ 17:17) (96/63 - 117/74)  RR: 18 (02-11-20 @ 17:17) (18 - 20)  SpO2: 96% (02-11-20 @ 17:17) (95% - 99%)  Wt(kg): --  I&O's Summary    10 Feb 2020 07:01  -  11 Feb 2020 07:00  --------------------------------------------------------  IN: 1200 mL / OUT: 0 mL / NET: 1200 mL    11 Feb 2020 07:01  -  11 Feb 2020 19:34  --------------------------------------------------------  IN: 840 mL / OUT: 0 mL / NET: 840 mL        PAST MEDICAL & SURGICAL HISTORY:  Oxygen dependent  Gastroesophageal reflux disease, esophagitis presence not specified  Smoker  Bipolar 1 disorder  Coronary artery disease involving native coronary artery of native heart without angina pectoris  Type 2 diabetes mellitus without complication, with long-term current use of insulin  Pulmonary HTN  HLD (hyperlipidemia)  Stented coronary artery  COPD (chronic obstructive pulmonary disease)  No significant past surgical history      SOCIAL HISTORY  Alcohol:  Tobacco:  Illicit substance use:    FAMILY HISTORY:    REVIEW OF SYSTEMS:  CONSTITUTIONAL: No fever, weight loss, or fatigue  EYES: No eye pain, visual disturbances, or discharge  ENMT:  No difficulty hearing, tinnitus, vertigo; No sinus or throat pain  NECK: No pain or stiffness  RESPIRATORY: No cough, wheezing, chills or hemoptysis; No shortness of breath  CARDIOVASCULAR: No chest pain, palpitations, dizziness, or leg swelling  GASTROINTESTINAL: No abdominal or epigastric pain. No nausea, vomiting, or hematemesis; No diarrhea or constipation. No melena or hematochezia.  GENITOURINARY: No dysuria, frequency, hematuria, or incontinence  NEUROLOGICAL: No headaches, memory loss, loss of strength, numbness, or tremors  SKIN: No itching, burning, rashes, or lesions   LYMPH NODES: No enlarged glands  ENDOCRINE: No heat or cold intolerance; No hair loss  MUSCULOSKELETAL: No joint pain or swelling; No muscle, back, or extremity pain  PSYCHIATRIC: No depression, anxiety, mood swings, or difficulty sleeping  HEME/LYMPH: No easy bruising, or bleeding gums  ALLERY AND IMMUNOLOGIC: No hives or eczema    RADIOLOGY & ADDITIONAL TESTS:    Imaging Personally Reviewed:  [ ] YES  [ ] NO    Consultant(s) Notes Reviewed:  [ ] YES  [ ] NO    PHYSICAL EXAM:  GENERAL: NAD, well-groomed, well-developed  HEAD:  Atraumatic, Normocephalic  EYES: EOMI, PERRLA, conjunctiva and sclera clear  ENMT: No tonsillar erythema, exudates, or enlargement; Moist mucous membranes, Good dentition, No lesions  NECK: Supple, No JVD, Normal thyroid  NERVOUS SYSTEM:  Alert & Oriented X3, Good concentration; Motor Strength 5/5 B/L upper and lower extremities; DTRs 2+ intact and symmetric  CHEST/LUNG: Clear to percussion bilaterally; No rales, rhonchi, wheezing, or rubs  HEART: Regular rate and rhythm; No murmurs, rubs, or gallops  ABDOMEN: Soft, Nontender, Nondistended; Bowel sounds present  EXTREMITIES:  2+ Peripheral Pulses, No clubbing, cyanosis, or edema  LYMPH: No lymphadenopathy noted  SKIN: No rashes or lesions    LABS:                        11.9   9.60  )-----------( 216      ( 11 Feb 2020 06:52 )             37.2     02-11    135  |  97  |  15  ----------------------------<  222<H>  4.4   |  28  |  0.62    Ca    9.2      11 Feb 2020 06:52          CAPILLARY BLOOD GLUCOSE      POCT Blood Glucose.: 143 mg/dL (11 Feb 2020 17:15)  POCT Blood Glucose.: 167 mg/dL (11 Feb 2020 11:55)  POCT Blood Glucose.: 219 mg/dL (11 Feb 2020 08:28)  POCT Blood Glucose.: 278 mg/dL (10 Feb 2020 21:40)            MEDICATIONS  (STANDING):  albuterol/ipratropium for Nebulization 3 milliLiter(s) Nebulizer every 6 hours  aspirin enteric coated 81 milliGRAM(s) Oral daily  atorvastatin 40 milliGRAM(s) Oral at bedtime  budesonide 160 MICROgram(s)/formoterol 4.5 MICROgram(s) Inhaler 2 Puff(s) Inhalation two times a day  dextrose 5%. 1000 milliLiter(s) (50 mL/Hr) IV Continuous <Continuous>  dextrose 50% Injectable 12.5 Gram(s) IV Push once  dextrose 50% Injectable 25 Gram(s) IV Push once  dextrose 50% Injectable 25 Gram(s) IV Push once  enoxaparin Injectable 40 milliGRAM(s) SubCutaneous daily  furosemide    Tablet 20 milliGRAM(s) Oral daily  insulin glargine Injectable (LANTUS) 75 Unit(s) SubCutaneous at bedtime  insulin lispro (HumaLOG) corrective regimen sliding scale   SubCutaneous three times a day before meals  insulin lispro (HumaLOG) corrective regimen sliding scale   SubCutaneous at bedtime  insulin lispro Injectable (HumaLOG) 30 Unit(s) SubCutaneous three times a day with meals  lisinopril 2.5 milliGRAM(s) Oral daily  melatonin 5 milliGRAM(s) Oral at bedtime  nicotine - Inhaler 1 Each Inhalation daily  nystatin Powder 1 Application(s) Topical two times a day  pantoprazole    Tablet 40 milliGRAM(s) Oral daily  polyethylene glycol 3350 17 Gram(s) Oral every 12 hours  risperiDONE   Tablet 0.5 milliGRAM(s) Oral three times a day  senna 2 Tablet(s) Oral at bedtime  sertraline 50 milliGRAM(s) Oral at bedtime    MEDICATIONS  (PRN):  acetaminophen   Tablet .. 650 milliGRAM(s) Oral every 6 hours PRN Mild Pain (1 - 3)  aluminum hydroxide/magnesium hydroxide/simethicone Suspension 30 milliLiter(s) Oral every 4 hours PRN Dyspepsia  bisacodyl 5 milliGRAM(s) Oral every 12 hours PRN Constipation  dextrose 40% Gel 15 Gram(s) Oral once PRN Blood Glucose LESS THAN 70 milliGRAM(s)/deciliter  glucagon  Injectable 1 milliGRAM(s) IntraMuscular once PRN Glucose LESS THAN 70 milligrams/deciliter  oxycodone    5 mG/acetaminophen 325 mG 2 Tablet(s) Oral every 6 hours PRN Moderate Pain (4 - 6)      Care Discussed with Consultants/Other Providers [ ] YES  [ ] NO

## 2020-02-11 NOTE — PROGRESS NOTE ADULT - ASSESSMENT
Assessment  DMT2: 59y Male with DM T2 with hyperglycemia, A1C 10.4%, on basal bolus insulin, blood sugars fluctuating, FS improving, trending within overall acceptable range with some elevations, no hypoglycemic episodes. Patient is alert, comfortable, sitting up waiting for lunch. Still awaiting placement to shelter.  CAD: on medications, no chest pain, stable, monitored.  HTN: Controlled,  on antihypertensive medications.  Overweight/Obesity: No strict exercise routines, not on any weight loss program, neither on low  calorie diet.          Laury Romero MD  Cell: [phone]  Office: [phone] Assessment  DMT2: 59y Male with DM T2 with hyperglycemia, A1C 10.4%, on basal bolus insulin, blood sugars fluctuating, FS improving, trending within overall acceptable range with some elevations, no hypoglycemic episodes. Patient is alert, comfortable, sitting up waiting  for lunch. Still awaiting placement to shelter.  CAD: on medications, no chest pain, stable, monitored.  HTN: Controlled,  on antihypertensive medications.  Overweight/Obesity: No strict exercise routines, not on any weight loss program, neither on low  calorie diet.          Laury Romero MD  Cell: [phone]  Office: [phone]

## 2020-02-11 NOTE — PROGRESS NOTE ADULT - PROBLEM SELECTOR PLAN 1
Will continue current insulin regimen for now. Will continue monitoring FS, log, will Follow up.  Suggest to DC on current insulin regimen and FU endo 4 weeks. Discussed with patient.  Patient counseled for compliance with consistent low carb diet and exercise as tolerated outpatient. Will continue current insulin regimen for now. Will continue monitoring FS, log, will Follow up.  Suggest to DC on current insulin regimen and FU  endo 4 weeks. Discussed with patient.  Patient counseled for compliance with consistent low carb diet and exercise as tolerated outpatient.

## 2020-02-12 LAB
GLUCOSE BLDC GLUCOMTR-MCNC: 106 MG/DL — HIGH (ref 70–99)
GLUCOSE BLDC GLUCOMTR-MCNC: 153 MG/DL — HIGH (ref 70–99)
GLUCOSE BLDC GLUCOMTR-MCNC: 162 MG/DL — HIGH (ref 70–99)
GLUCOSE BLDC GLUCOMTR-MCNC: 176 MG/DL — HIGH (ref 70–99)

## 2020-02-12 RX ADMIN — ATORVASTATIN CALCIUM 40 MILLIGRAM(S): 80 TABLET, FILM COATED ORAL at 20:42

## 2020-02-12 RX ADMIN — Medication 1: at 17:32

## 2020-02-12 RX ADMIN — RISPERIDONE 0.5 MILLIGRAM(S): 4 TABLET ORAL at 05:17

## 2020-02-12 RX ADMIN — Medication 30 UNIT(S): at 12:09

## 2020-02-12 RX ADMIN — PANTOPRAZOLE SODIUM 40 MILLIGRAM(S): 20 TABLET, DELAYED RELEASE ORAL at 05:19

## 2020-02-12 RX ADMIN — RISPERIDONE 0.5 MILLIGRAM(S): 4 TABLET ORAL at 20:42

## 2020-02-12 RX ADMIN — NYSTATIN CREAM 1 APPLICATION(S): 100000 CREAM TOPICAL at 05:16

## 2020-02-12 RX ADMIN — OXYCODONE AND ACETAMINOPHEN 2 TABLET(S): 5; 325 TABLET ORAL at 14:56

## 2020-02-12 RX ADMIN — NYSTATIN CREAM 1 APPLICATION(S): 100000 CREAM TOPICAL at 20:44

## 2020-02-12 RX ADMIN — OXYCODONE AND ACETAMINOPHEN 2 TABLET(S): 5; 325 TABLET ORAL at 22:22

## 2020-02-12 RX ADMIN — OXYCODONE AND ACETAMINOPHEN 2 TABLET(S): 5; 325 TABLET ORAL at 08:24

## 2020-02-12 RX ADMIN — Medication 30 UNIT(S): at 17:32

## 2020-02-12 RX ADMIN — OXYCODONE AND ACETAMINOPHEN 2 TABLET(S): 5; 325 TABLET ORAL at 20:41

## 2020-02-12 RX ADMIN — Medication 3 MILLILITER(S): at 17:32

## 2020-02-12 RX ADMIN — Medication 3 MILLILITER(S): at 05:17

## 2020-02-12 RX ADMIN — Medication 30 UNIT(S): at 08:24

## 2020-02-12 RX ADMIN — SERTRALINE 50 MILLIGRAM(S): 25 TABLET, FILM COATED ORAL at 20:43

## 2020-02-12 RX ADMIN — Medication 3 MILLILITER(S): at 23:44

## 2020-02-12 RX ADMIN — INSULIN GLARGINE 75 UNIT(S): 100 INJECTION, SOLUTION SUBCUTANEOUS at 22:20

## 2020-02-12 RX ADMIN — Medication 1: at 08:24

## 2020-02-12 RX ADMIN — Medication 3 MILLILITER(S): at 11:57

## 2020-02-12 RX ADMIN — Medication 5 MILLIGRAM(S): at 20:42

## 2020-02-12 RX ADMIN — OXYCODONE AND ACETAMINOPHEN 2 TABLET(S): 5; 325 TABLET ORAL at 08:54

## 2020-02-12 RX ADMIN — Medication 81 MILLIGRAM(S): at 11:56

## 2020-02-12 RX ADMIN — OXYCODONE AND ACETAMINOPHEN 2 TABLET(S): 5; 325 TABLET ORAL at 14:26

## 2020-02-12 RX ADMIN — BUDESONIDE AND FORMOTEROL FUMARATE DIHYDRATE 2 PUFF(S): 160; 4.5 AEROSOL RESPIRATORY (INHALATION) at 05:19

## 2020-02-12 RX ADMIN — Medication 20 MILLIGRAM(S): at 05:17

## 2020-02-12 RX ADMIN — LISINOPRIL 2.5 MILLIGRAM(S): 2.5 TABLET ORAL at 05:17

## 2020-02-12 RX ADMIN — BUDESONIDE AND FORMOTEROL FUMARATE DIHYDRATE 2 PUFF(S): 160; 4.5 AEROSOL RESPIRATORY (INHALATION) at 17:32

## 2020-02-12 RX ADMIN — RISPERIDONE 0.5 MILLIGRAM(S): 4 TABLET ORAL at 14:26

## 2020-02-12 RX ADMIN — ENOXAPARIN SODIUM 40 MILLIGRAM(S): 100 INJECTION SUBCUTANEOUS at 11:56

## 2020-02-12 NOTE — PROGRESS NOTE ADULT - PROBLEM SELECTOR PLAN 1
Will continue current insulin regimen for now. Will continue monitoring FS, log, will Follow up.  Suggest to DC on current insulin regimen and FU  endo 4 weeks. Discussed with patient.  Patient counseled for compliance with consistent low carb diet and exercise as tolerated outpatient. Will continue current insulin regimen for now. Will continue monitoring FS, log, will Follow up.

## 2020-02-12 NOTE — PROGRESS NOTE ADULT - SUBJECTIVE AND OBJECTIVE BOX
Patient is a 59y old  Male who presents with a chief complaint of 59M w/ n/v/d and hyperglycemia sent in from [facility] (12 Feb 2020 15:36)      INTERVAL HPI/OVERNIGHT EVENTS:  T(C): 36.3 (02-12-20 @ 14:51), Max: 36.6 (02-11-20 @ 20:56)  HR: 77 (02-12-20 @ 14:51) (75 - 85)  BP: 120/68 (02-12-20 @ 14:51) (103/66 - 120/68)  RR: 18 (02-12-20 @ 14:51) (18 - 18)  SpO2: 98% (02-12-20 @ 14:51) (97% - 98%)  Wt(kg): --  I&O's Summary    11 Feb 2020 07:01  -  12 Feb 2020 07:00  --------------------------------------------------------  IN: 1580 mL / OUT: 2 mL / NET: 1578 mL    12 Feb 2020 07:01  -  12 Feb 2020 19:06  --------------------------------------------------------  IN: 710 mL / OUT: 0 mL / NET: 710 mL        PAST MEDICAL & SURGICAL HISTORY:  Oxygen dependent  Gastroesophageal reflux disease, esophagitis presence not specified  Smoker  Bipolar 1 disorder  Coronary artery disease involving native coronary artery of native heart without angina pectoris  Type 2 diabetes mellitus without complication, with long-term current use of insulin  Pulmonary HTN  HLD (hyperlipidemia)  Stented coronary artery  COPD (chronic obstructive pulmonary disease)  No significant past surgical history      SOCIAL HISTORY  Alcohol:  Tobacco:  Illicit substance use:    FAMILY HISTORY:    REVIEW OF SYSTEMS:  CONSTITUTIONAL: No fever, weight loss, or fatigue  EYES: No eye pain, visual disturbances, or discharge  ENMT:  No difficulty hearing, tinnitus, vertigo; No sinus or throat pain  NECK: No pain or stiffness  RESPIRATORY: No cough, wheezing, chills or hemoptysis; No shortness of breath  CARDIOVASCULAR: No chest pain, palpitations, dizziness, or leg swelling  GASTROINTESTINAL: No abdominal or epigastric pain. No nausea, vomiting, or hematemesis; No diarrhea or constipation. No melena or hematochezia.  GENITOURINARY: No dysuria, frequency, hematuria, or incontinence  NEUROLOGICAL: No headaches, memory loss, loss of strength, numbness, or tremors  SKIN: No itching, burning, rashes, or lesions   LYMPH NODES: No enlarged glands  ENDOCRINE: No heat or cold intolerance; No hair loss  MUSCULOSKELETAL: No joint pain or swelling; No muscle, back, or extremity pain  PSYCHIATRIC: No depression, anxiety, mood swings, or difficulty sleeping  HEME/LYMPH: No easy bruising, or bleeding gums  ALLERY AND IMMUNOLOGIC: No hives or eczema    RADIOLOGY & ADDITIONAL TESTS:    Imaging Personally Reviewed:  [ ] YES  [ ] NO    Consultant(s) Notes Reviewed:  [ ] YES  [ ] NO    PHYSICAL EXAM:  GENERAL: NAD, well-groomed, well-developed  HEAD:  Atraumatic, Normocephalic  EYES: EOMI, PERRLA, conjunctiva and sclera clear  ENMT: No tonsillar erythema, exudates, or enlargement; Moist mucous membranes, Good dentition, No lesions  NECK: Supple, No JVD, Normal thyroid  NERVOUS SYSTEM:  Alert & Oriented X3, Good concentration; Motor Strength 5/5 B/L upper and lower extremities; DTRs 2+ intact and symmetric  CHEST/LUNG: Clear to percussion bilaterally; No rales, rhonchi, wheezing, or rubs  HEART: Regular rate and rhythm; No murmurs, rubs, or gallops  ABDOMEN: Soft, Nontender, Nondistended; Bowel sounds present  EXTREMITIES:  2+ Peripheral Pulses, No clubbing, cyanosis, or edema  LYMPH: No lymphadenopathy noted  SKIN: No rashes or lesions    LABS:                        11.9   9.60  )-----------( 216      ( 11 Feb 2020 06:52 )             37.2     02-11    135  |  97  |  15  ----------------------------<  222<H>  4.4   |  28  |  0.62    Ca    9.2      11 Feb 2020 06:52          CAPILLARY BLOOD GLUCOSE      POCT Blood Glucose.: 153 mg/dL (12 Feb 2020 17:18)  POCT Blood Glucose.: 106 mg/dL (12 Feb 2020 12:03)  POCT Blood Glucose.: 176 mg/dL (12 Feb 2020 08:14)  POCT Blood Glucose.: 282 mg/dL (11 Feb 2020 22:09)            MEDICATIONS  (STANDING):  albuterol/ipratropium for Nebulization 3 milliLiter(s) Nebulizer every 6 hours  aspirin enteric coated 81 milliGRAM(s) Oral daily  atorvastatin 40 milliGRAM(s) Oral at bedtime  budesonide 160 MICROgram(s)/formoterol 4.5 MICROgram(s) Inhaler 2 Puff(s) Inhalation two times a day  dextrose 5%. 1000 milliLiter(s) (50 mL/Hr) IV Continuous <Continuous>  dextrose 50% Injectable 12.5 Gram(s) IV Push once  dextrose 50% Injectable 25 Gram(s) IV Push once  dextrose 50% Injectable 25 Gram(s) IV Push once  enoxaparin Injectable 40 milliGRAM(s) SubCutaneous daily  furosemide    Tablet 20 milliGRAM(s) Oral daily  insulin glargine Injectable (LANTUS) 75 Unit(s) SubCutaneous at bedtime  insulin lispro (HumaLOG) corrective regimen sliding scale   SubCutaneous three times a day before meals  insulin lispro (HumaLOG) corrective regimen sliding scale   SubCutaneous at bedtime  insulin lispro Injectable (HumaLOG) 30 Unit(s) SubCutaneous three times a day with meals  lisinopril 2.5 milliGRAM(s) Oral daily  melatonin 5 milliGRAM(s) Oral at bedtime  nicotine - Inhaler 1 Each Inhalation daily  nystatin Powder 1 Application(s) Topical two times a day  pantoprazole    Tablet 40 milliGRAM(s) Oral daily  polyethylene glycol 3350 17 Gram(s) Oral every 12 hours  risperiDONE   Tablet 0.5 milliGRAM(s) Oral three times a day  senna 2 Tablet(s) Oral at bedtime  sertraline 50 milliGRAM(s) Oral at bedtime    MEDICATIONS  (PRN):  acetaminophen   Tablet .. 650 milliGRAM(s) Oral every 6 hours PRN Mild Pain (1 - 3)  aluminum hydroxide/magnesium hydroxide/simethicone Suspension 30 milliLiter(s) Oral every 4 hours PRN Dyspepsia  bisacodyl 5 milliGRAM(s) Oral every 12 hours PRN Constipation  dextrose 40% Gel 15 Gram(s) Oral once PRN Blood Glucose LESS THAN 70 milliGRAM(s)/deciliter  glucagon  Injectable 1 milliGRAM(s) IntraMuscular once PRN Glucose LESS THAN 70 milligrams/deciliter  oxycodone    5 mG/acetaminophen 325 mG 2 Tablet(s) Oral every 6 hours PRN Moderate Pain (4 - 6)      Care Discussed with Consultants/Other Providers [ ] YES  [ ] NO

## 2020-02-12 NOTE — PROGRESS NOTE ADULT - ASSESSMENT
Assessment  DMT2: 59y Male with DM T2 with hyperglycemia, A1C 10.4%, on basal bolus insulin, blood sugars improving, FS trending within overall acceptable range, no hypoglycemic episodes. Patient is alert, comfortable,still awaiting placement to shelter. No acute events.  CAD: on medications, no chest pain, stable, monitored.  HTN: Controlled,  on antihypertensive medications.  Overweight/Obesity: No strict exercise routines, not on any weight loss program, neither on low  calorie diet.          Laury Romero MD  Cell: [phone]  Office: [phone] Assessment  DMT2: 59y Male with DM T2 with hyperglycemia, A1C 10.4%, on basal bolus insulin, blood sugars improving, FS trending within overall acceptable range, no hypoglycemic episodes.  Patient is alert, comfortable,still awaiting placement to shelter. No acute events.  CAD: on medications, no chest pain, stable, monitored.  HTN: Controlled,  on antihypertensive medications.  Overweight/Obesity: No strict exercise routines, not on any weight loss program, neither on low  calorie diet.          Laury Romero MD  Cell: [phone]  Office: [phone]

## 2020-02-13 LAB
GLUCOSE BLDC GLUCOMTR-MCNC: 113 MG/DL — HIGH (ref 70–99)
GLUCOSE BLDC GLUCOMTR-MCNC: 127 MG/DL — HIGH (ref 70–99)
GLUCOSE BLDC GLUCOMTR-MCNC: 197 MG/DL — HIGH (ref 70–99)
GLUCOSE BLDC GLUCOMTR-MCNC: 211 MG/DL — HIGH (ref 70–99)

## 2020-02-13 RX ADMIN — Medication 5 MILLIGRAM(S): at 21:33

## 2020-02-13 RX ADMIN — OXYCODONE AND ACETAMINOPHEN 2 TABLET(S): 5; 325 TABLET ORAL at 06:40

## 2020-02-13 RX ADMIN — OXYCODONE AND ACETAMINOPHEN 2 TABLET(S): 5; 325 TABLET ORAL at 06:10

## 2020-02-13 RX ADMIN — RISPERIDONE 0.5 MILLIGRAM(S): 4 TABLET ORAL at 21:33

## 2020-02-13 RX ADMIN — OXYCODONE AND ACETAMINOPHEN 2 TABLET(S): 5; 325 TABLET ORAL at 18:52

## 2020-02-13 RX ADMIN — RISPERIDONE 0.5 MILLIGRAM(S): 4 TABLET ORAL at 13:21

## 2020-02-13 RX ADMIN — Medication 3 MILLILITER(S): at 17:53

## 2020-02-13 RX ADMIN — Medication 20 MILLIGRAM(S): at 06:10

## 2020-02-13 RX ADMIN — BUDESONIDE AND FORMOTEROL FUMARATE DIHYDRATE 2 PUFF(S): 160; 4.5 AEROSOL RESPIRATORY (INHALATION) at 06:12

## 2020-02-13 RX ADMIN — OXYCODONE AND ACETAMINOPHEN 2 TABLET(S): 5; 325 TABLET ORAL at 12:18

## 2020-02-13 RX ADMIN — Medication 650 MILLIGRAM(S): at 17:53

## 2020-02-13 RX ADMIN — OXYCODONE AND ACETAMINOPHEN 2 TABLET(S): 5; 325 TABLET ORAL at 12:48

## 2020-02-13 RX ADMIN — RISPERIDONE 0.5 MILLIGRAM(S): 4 TABLET ORAL at 06:10

## 2020-02-13 RX ADMIN — BUDESONIDE AND FORMOTEROL FUMARATE DIHYDRATE 2 PUFF(S): 160; 4.5 AEROSOL RESPIRATORY (INHALATION) at 17:55

## 2020-02-13 RX ADMIN — Medication 30 UNIT(S): at 12:16

## 2020-02-13 RX ADMIN — NYSTATIN CREAM 1 APPLICATION(S): 100000 CREAM TOPICAL at 06:12

## 2020-02-13 RX ADMIN — Medication 3 MILLILITER(S): at 12:19

## 2020-02-13 RX ADMIN — INSULIN GLARGINE 75 UNIT(S): 100 INJECTION, SOLUTION SUBCUTANEOUS at 22:07

## 2020-02-13 RX ADMIN — NYSTATIN CREAM 1 APPLICATION(S): 100000 CREAM TOPICAL at 22:07

## 2020-02-13 RX ADMIN — LISINOPRIL 2.5 MILLIGRAM(S): 2.5 TABLET ORAL at 06:11

## 2020-02-13 RX ADMIN — OXYCODONE AND ACETAMINOPHEN 2 TABLET(S): 5; 325 TABLET ORAL at 18:22

## 2020-02-13 RX ADMIN — Medication 81 MILLIGRAM(S): at 11:42

## 2020-02-13 RX ADMIN — Medication 650 MILLIGRAM(S): at 11:43

## 2020-02-13 RX ADMIN — SERTRALINE 50 MILLIGRAM(S): 25 TABLET, FILM COATED ORAL at 21:33

## 2020-02-13 RX ADMIN — ENOXAPARIN SODIUM 40 MILLIGRAM(S): 100 INJECTION SUBCUTANEOUS at 11:41

## 2020-02-13 RX ADMIN — Medication 650 MILLIGRAM(S): at 18:23

## 2020-02-13 RX ADMIN — Medication 30 UNIT(S): at 17:55

## 2020-02-13 RX ADMIN — Medication 3 MILLILITER(S): at 06:11

## 2020-02-13 RX ADMIN — Medication 30 UNIT(S): at 08:41

## 2020-02-13 RX ADMIN — PANTOPRAZOLE SODIUM 40 MILLIGRAM(S): 20 TABLET, DELAYED RELEASE ORAL at 06:10

## 2020-02-13 RX ADMIN — Medication 1: at 08:41

## 2020-02-13 RX ADMIN — ATORVASTATIN CALCIUM 40 MILLIGRAM(S): 80 TABLET, FILM COATED ORAL at 21:33

## 2020-02-13 RX ADMIN — Medication 650 MILLIGRAM(S): at 12:16

## 2020-02-13 NOTE — PROGRESS NOTE ADULT - ASSESSMENT
Assessment  DMT2: 59y Male with DM T2 with hyperglycemia, A1C 10.4%, on basal bolus insulin, blood sugars improving, FS trending within overall acceptable range, no hypoglycemic episodes. Patient is alert, comfortable, eating full meals with good appetite, no complaints. Awaiting placement to shelter.   CAD: on medications, no chest pain, stable, monitored.  HTN: Controlled,  on antihypertensive medications.  Overweight/Obesity: No strict exercise routines, not on any weight loss program, neither on low  calorie diet.          Laury Romero MD  Cell: [phone]  Office: [phone] Assessment  DMT2: 59y Male with DM T2 with hyperglycemia, A1C 10.4%, on basal bolus insulin, blood sugars improving, FS trending within overall acceptable range, no hypoglycemic episodes. Patient is alert,  comfortable, eating full meals with good appetite, no complaints. Awaiting placement to shelter.   CAD: on medications, no chest pain, stable, monitored.  HTN: Controlled,  on antihypertensive medications.  Overweight/Obesity: No strict exercise routines, not on any weight loss program, neither on low  calorie diet.          Laury Romero MD  Cell: [phone]  Office: [phone]

## 2020-02-13 NOTE — PROGRESS NOTE ADULT - SUBJECTIVE AND OBJECTIVE BOX
Patient is a 59y old  Male who presents with a chief complaint of 59M w/ n/v/d and hyperglycemia sent in from [facility] (13 Feb 2020 13:52)    pt. seen and examined, no c/o    INTERVAL HPI/OVERNIGHT EVENTS:  T(C): 36.3 (02-13-20 @ 20:09), Max: 36.7 (02-13-20 @ 13:55)  HR: 93 (02-13-20 @ 20:09) (75 - 93)  BP: 114/73 (02-13-20 @ 20:09) (101/65 - 114/73)  RR: 20 (02-13-20 @ 20:09) (18 - 20)  SpO2: 95% (02-13-20 @ 20:09) (95% - 97%)  Wt(kg): --  I&O's Summary    12 Feb 2020 07:01  -  13 Feb 2020 07:00  --------------------------------------------------------  IN: 1190 mL / OUT: 0 mL / NET: 1190 mL    13 Feb 2020 07:01  -  13 Feb 2020 21:55  --------------------------------------------------------  IN: 400 mL / OUT: 0 mL / NET: 400 mL        PAST MEDICAL & SURGICAL HISTORY:  Oxygen dependent  Gastroesophageal reflux disease, esophagitis presence not specified  Smoker  Bipolar 1 disorder  Coronary artery disease involving native coronary artery of native heart without angina pectoris  Type 2 diabetes mellitus without complication, with long-term current use of insulin  Pulmonary HTN  HLD (hyperlipidemia)  Stented coronary artery  COPD (chronic obstructive pulmonary disease)  No significant past surgical history      SOCIAL HISTORY  Alcohol:  Tobacco:  Illicit substance use:    FAMILY HISTORY:    REVIEW OF SYSTEMS:  CONSTITUTIONAL: No fever, weight loss, or fatigue  EYES: No eye pain, visual disturbances, or discharge  ENMT:  No difficulty hearing, tinnitus, vertigo; No sinus or throat pain  NECK: No pain or stiffness  RESPIRATORY: No cough, wheezing, chills or hemoptysis; No shortness of breath  CARDIOVASCULAR: No chest pain, palpitations, dizziness, or leg swelling  GASTROINTESTINAL: No abdominal or epigastric pain. No nausea, vomiting, or hematemesis; No diarrhea or constipation. No melena or hematochezia.  GENITOURINARY: No dysuria, frequency, hematuria, or incontinence  NEUROLOGICAL: No headaches, memory loss, loss of strength, numbness, or tremors  SKIN: No itching, burning, rashes, or lesions   LYMPH NODES: No enlarged glands  ENDOCRINE: No heat or cold intolerance; No hair loss  MUSCULOSKELETAL: No joint pain or swelling; No muscle, back, or extremity pain  PSYCHIATRIC: No depression, anxiety, mood swings, or difficulty sleeping  HEME/LYMPH: No easy bruising, or bleeding gums  ALLERY AND IMMUNOLOGIC: No hives or eczema    RADIOLOGY & ADDITIONAL TESTS:    Imaging Personally Reviewed:  [ ] YES  [ ] NO    Consultant(s) Notes Reviewed:  [ ] YES  [ ] NO    PHYSICAL EXAM:  GENERAL: NAD, well-groomed, well-developed  HEAD:  Atraumatic, Normocephalic  EYES: EOMI, PERRLA, conjunctiva and sclera clear  ENMT: No tonsillar erythema, exudates, or enlargement; Moist mucous membranes, Good dentition, No lesions  NECK: Supple, No JVD, Normal thyroid  NERVOUS SYSTEM:  Alert & Oriented X3, Good concentration; Motor Strength 5/5 B/L upper and lower extremities; DTRs 2+ intact and symmetric  CHEST/LUNG: Clear to percussion bilaterally; No rales, rhonchi, wheezing, or rubs  HEART: Regular rate and rhythm; No murmurs, rubs, or gallops  ABDOMEN: Soft, Nontender, Nondistended; Bowel sounds present  EXTREMITIES:  2+ Peripheral Pulses, No clubbing, cyanosis, or edema  LYMPH: No lymphadenopathy noted  SKIN: No rashes or lesions    LABS:              CAPILLARY BLOOD GLUCOSE      POCT Blood Glucose.: 113 mg/dL (13 Feb 2020 17:23)  POCT Blood Glucose.: 127 mg/dL (13 Feb 2020 12:10)  POCT Blood Glucose.: 197 mg/dL (13 Feb 2020 08:29)            MEDICATIONS  (STANDING):  albuterol/ipratropium for Nebulization 3 milliLiter(s) Nebulizer every 6 hours  aspirin enteric coated 81 milliGRAM(s) Oral daily  atorvastatin 40 milliGRAM(s) Oral at bedtime  budesonide 160 MICROgram(s)/formoterol 4.5 MICROgram(s) Inhaler 2 Puff(s) Inhalation two times a day  dextrose 5%. 1000 milliLiter(s) (50 mL/Hr) IV Continuous <Continuous>  dextrose 50% Injectable 12.5 Gram(s) IV Push once  dextrose 50% Injectable 25 Gram(s) IV Push once  dextrose 50% Injectable 25 Gram(s) IV Push once  enoxaparin Injectable 40 milliGRAM(s) SubCutaneous daily  furosemide    Tablet 20 milliGRAM(s) Oral daily  insulin glargine Injectable (LANTUS) 75 Unit(s) SubCutaneous at bedtime  insulin lispro (HumaLOG) corrective regimen sliding scale   SubCutaneous three times a day before meals  insulin lispro (HumaLOG) corrective regimen sliding scale   SubCutaneous at bedtime  insulin lispro Injectable (HumaLOG) 30 Unit(s) SubCutaneous three times a day with meals  lisinopril 2.5 milliGRAM(s) Oral daily  melatonin 5 milliGRAM(s) Oral at bedtime  nicotine - Inhaler 1 Each Inhalation daily  nystatin Powder 1 Application(s) Topical two times a day  pantoprazole    Tablet 40 milliGRAM(s) Oral daily  polyethylene glycol 3350 17 Gram(s) Oral every 12 hours  risperiDONE   Tablet 0.5 milliGRAM(s) Oral three times a day  senna 2 Tablet(s) Oral at bedtime  sertraline 50 milliGRAM(s) Oral at bedtime    MEDICATIONS  (PRN):  acetaminophen   Tablet .. 650 milliGRAM(s) Oral every 6 hours PRN Mild Pain (1 - 3)  aluminum hydroxide/magnesium hydroxide/simethicone Suspension 30 milliLiter(s) Oral every 4 hours PRN Dyspepsia  bisacodyl 5 milliGRAM(s) Oral every 12 hours PRN Constipation  dextrose 40% Gel 15 Gram(s) Oral once PRN Blood Glucose LESS THAN 70 milliGRAM(s)/deciliter  glucagon  Injectable 1 milliGRAM(s) IntraMuscular once PRN Glucose LESS THAN 70 milligrams/deciliter  oxycodone    5 mG/acetaminophen 325 mG 2 Tablet(s) Oral every 6 hours PRN Moderate Pain (4 - 6)      Care Discussed with Consultants/Other Providers [ ] YES  [ ] NO

## 2020-02-13 NOTE — PROGRESS NOTE ADULT - SUBJECTIVE AND OBJECTIVE BOX
Chief complaint  Patient is a 59y old  Male who presents with a chief complaint of 59M w/ n/v/d and hyperglycemia sent in from [facility] (12 Feb 2020 19:06)   Review of systems  Patient in bed, looks comfortable, no hypoglycemia.    Labs and Fingersticks  CAPILLARY BLOOD GLUCOSE      POCT Blood Glucose.: 127 mg/dL (13 Feb 2020 12:10)  POCT Blood Glucose.: 197 mg/dL (13 Feb 2020 08:29)  POCT Blood Glucose.: 162 mg/dL (12 Feb 2020 21:46)  POCT Blood Glucose.: 153 mg/dL (12 Feb 2020 17:18)                        Medications  MEDICATIONS  (STANDING):  albuterol/ipratropium for Nebulization 3 milliLiter(s) Nebulizer every 6 hours  aspirin enteric coated 81 milliGRAM(s) Oral daily  atorvastatin 40 milliGRAM(s) Oral at bedtime  budesonide 160 MICROgram(s)/formoterol 4.5 MICROgram(s) Inhaler 2 Puff(s) Inhalation two times a day  dextrose 5%. 1000 milliLiter(s) (50 mL/Hr) IV Continuous <Continuous>  dextrose 50% Injectable 12.5 Gram(s) IV Push once  dextrose 50% Injectable 25 Gram(s) IV Push once  dextrose 50% Injectable 25 Gram(s) IV Push once  enoxaparin Injectable 40 milliGRAM(s) SubCutaneous daily  furosemide    Tablet 20 milliGRAM(s) Oral daily  insulin glargine Injectable (LANTUS) 75 Unit(s) SubCutaneous at bedtime  insulin lispro (HumaLOG) corrective regimen sliding scale   SubCutaneous three times a day before meals  insulin lispro (HumaLOG) corrective regimen sliding scale   SubCutaneous at bedtime  insulin lispro Injectable (HumaLOG) 30 Unit(s) SubCutaneous three times a day with meals  lisinopril 2.5 milliGRAM(s) Oral daily  melatonin 5 milliGRAM(s) Oral at bedtime  nicotine - Inhaler 1 Each Inhalation daily  nystatin Powder 1 Application(s) Topical two times a day  pantoprazole    Tablet 40 milliGRAM(s) Oral daily  polyethylene glycol 3350 17 Gram(s) Oral every 12 hours  risperiDONE   Tablet 0.5 milliGRAM(s) Oral three times a day  senna 2 Tablet(s) Oral at bedtime  sertraline 50 milliGRAM(s) Oral at bedtime      Physical Exam  General: Patient comfortable in bed  Vital Signs Last 12 Hrs  T(F): 97.7 (02-13-20 @ 04:45), Max: 97.7 (02-13-20 @ 04:45)  HR: 79 (02-13-20 @ 04:45) (79 - 79)  BP: 101/65 (02-13-20 @ 04:45) (101/65 - 101/65)  BP(mean): --  RR: 18 (02-13-20 @ 04:45) (18 - 18)  SpO2: 96% (02-13-20 @ 04:45) (96% - 96%)  Neck: No palpable thyroid nodules.  CVS: S1S2, No murmurs  Respiratory: No wheezing, no crepitations  GI: Abdomen soft, bowel sounds positive  Musculoskeletal:  edema lower extremities.   Skin: No skin rashes, no ecchymosis    Diagnostics Chief complaint  Patient is a 59y old  Male who presents with a chief complaint of 59M w/ n/v/d and hyperglycemia sent in from [facility] (12 Feb 2020 19:06)   Review of systems  Patient in bed, looks  comfortable, no hypoglycemia.    Labs and Fingersticks  CAPILLARY BLOOD GLUCOSE      POCT Blood Glucose.: 127 mg/dL (13 Feb 2020 12:10)  POCT Blood Glucose.: 197 mg/dL (13 Feb 2020 08:29)  POCT Blood Glucose.: 162 mg/dL (12 Feb 2020 21:46)  POCT Blood Glucose.: 153 mg/dL (12 Feb 2020 17:18)                        Medications  MEDICATIONS  (STANDING):  albuterol/ipratropium for Nebulization 3 milliLiter(s) Nebulizer every 6 hours  aspirin enteric coated 81 milliGRAM(s) Oral daily  atorvastatin 40 milliGRAM(s) Oral at bedtime  budesonide 160 MICROgram(s)/formoterol 4.5 MICROgram(s) Inhaler 2 Puff(s) Inhalation two times a day  dextrose 5%. 1000 milliLiter(s) (50 mL/Hr) IV Continuous <Continuous>  dextrose 50% Injectable 12.5 Gram(s) IV Push once  dextrose 50% Injectable 25 Gram(s) IV Push once  dextrose 50% Injectable 25 Gram(s) IV Push once  enoxaparin Injectable 40 milliGRAM(s) SubCutaneous daily  furosemide    Tablet 20 milliGRAM(s) Oral daily  insulin glargine Injectable (LANTUS) 75 Unit(s) SubCutaneous at bedtime  insulin lispro (HumaLOG) corrective regimen sliding scale   SubCutaneous three times a day before meals  insulin lispro (HumaLOG) corrective regimen sliding scale   SubCutaneous at bedtime  insulin lispro Injectable (HumaLOG) 30 Unit(s) SubCutaneous three times a day with meals  lisinopril 2.5 milliGRAM(s) Oral daily  melatonin 5 milliGRAM(s) Oral at bedtime  nicotine - Inhaler 1 Each Inhalation daily  nystatin Powder 1 Application(s) Topical two times a day  pantoprazole    Tablet 40 milliGRAM(s) Oral daily  polyethylene glycol 3350 17 Gram(s) Oral every 12 hours  risperiDONE   Tablet 0.5 milliGRAM(s) Oral three times a day  senna 2 Tablet(s) Oral at bedtime  sertraline 50 milliGRAM(s) Oral at bedtime      Physical Exam  General: Patient comfortable in bed  Vital Signs Last 12 Hrs  T(F): 97.7 (02-13-20 @ 04:45), Max: 97.7 (02-13-20 @ 04:45)  HR: 79 (02-13-20 @ 04:45) (79 - 79)  BP: 101/65 (02-13-20 @ 04:45) (101/65 - 101/65)  BP(mean): --  RR: 18 (02-13-20 @ 04:45) (18 - 18)  SpO2: 96% (02-13-20 @ 04:45) (96% - 96%)  Neck: No palpable thyroid nodules.  CVS: S1S2, No murmurs  Respiratory: No wheezing, no crepitations  GI: Abdomen soft, bowel sounds positive  Musculoskeletal:  edema lower extremities.   Skin: No skin rashes, no ecchymosis    Diagnostics

## 2020-02-14 LAB
GLUCOSE BLDC GLUCOMTR-MCNC: 164 MG/DL — HIGH (ref 70–99)
GLUCOSE BLDC GLUCOMTR-MCNC: 186 MG/DL — HIGH (ref 70–99)
GLUCOSE BLDC GLUCOMTR-MCNC: 195 MG/DL — HIGH (ref 70–99)
GLUCOSE BLDC GLUCOMTR-MCNC: 216 MG/DL — HIGH (ref 70–99)

## 2020-02-14 RX ADMIN — Medication 30 UNIT(S): at 13:02

## 2020-02-14 RX ADMIN — Medication 650 MILLIGRAM(S): at 05:43

## 2020-02-14 RX ADMIN — ATORVASTATIN CALCIUM 40 MILLIGRAM(S): 80 TABLET, FILM COATED ORAL at 21:28

## 2020-02-14 RX ADMIN — Medication 2: at 08:36

## 2020-02-14 RX ADMIN — OXYCODONE AND ACETAMINOPHEN 2 TABLET(S): 5; 325 TABLET ORAL at 23:17

## 2020-02-14 RX ADMIN — Medication 650 MILLIGRAM(S): at 13:06

## 2020-02-14 RX ADMIN — BUDESONIDE AND FORMOTEROL FUMARATE DIHYDRATE 2 PUFF(S): 160; 4.5 AEROSOL RESPIRATORY (INHALATION) at 17:07

## 2020-02-14 RX ADMIN — Medication 650 MILLIGRAM(S): at 06:20

## 2020-02-14 RX ADMIN — Medication 3 MILLILITER(S): at 17:07

## 2020-02-14 RX ADMIN — Medication 650 MILLIGRAM(S): at 20:30

## 2020-02-14 RX ADMIN — BUDESONIDE AND FORMOTEROL FUMARATE DIHYDRATE 2 PUFF(S): 160; 4.5 AEROSOL RESPIRATORY (INHALATION) at 05:44

## 2020-02-14 RX ADMIN — Medication 30 UNIT(S): at 17:38

## 2020-02-14 RX ADMIN — Medication 20 MILLIGRAM(S): at 08:36

## 2020-02-14 RX ADMIN — Medication 30 UNIT(S): at 08:36

## 2020-02-14 RX ADMIN — RISPERIDONE 0.5 MILLIGRAM(S): 4 TABLET ORAL at 13:03

## 2020-02-14 RX ADMIN — Medication 5 MILLIGRAM(S): at 21:28

## 2020-02-14 RX ADMIN — Medication 650 MILLIGRAM(S): at 13:36

## 2020-02-14 RX ADMIN — Medication 1: at 13:02

## 2020-02-14 RX ADMIN — INSULIN GLARGINE 75 UNIT(S): 100 INJECTION, SOLUTION SUBCUTANEOUS at 22:11

## 2020-02-14 RX ADMIN — LISINOPRIL 2.5 MILLIGRAM(S): 2.5 TABLET ORAL at 05:43

## 2020-02-14 RX ADMIN — OXYCODONE AND ACETAMINOPHEN 2 TABLET(S): 5; 325 TABLET ORAL at 08:36

## 2020-02-14 RX ADMIN — OXYCODONE AND ACETAMINOPHEN 2 TABLET(S): 5; 325 TABLET ORAL at 01:00

## 2020-02-14 RX ADMIN — NYSTATIN CREAM 1 APPLICATION(S): 100000 CREAM TOPICAL at 21:29

## 2020-02-14 RX ADMIN — Medication 81 MILLIGRAM(S): at 13:03

## 2020-02-14 RX ADMIN — Medication 650 MILLIGRAM(S): at 19:32

## 2020-02-14 RX ADMIN — RISPERIDONE 0.5 MILLIGRAM(S): 4 TABLET ORAL at 05:43

## 2020-02-14 RX ADMIN — NYSTATIN CREAM 1 APPLICATION(S): 100000 CREAM TOPICAL at 05:44

## 2020-02-14 RX ADMIN — Medication 3 MILLILITER(S): at 01:01

## 2020-02-14 RX ADMIN — OXYCODONE AND ACETAMINOPHEN 2 TABLET(S): 5; 325 TABLET ORAL at 01:45

## 2020-02-14 RX ADMIN — SERTRALINE 50 MILLIGRAM(S): 25 TABLET, FILM COATED ORAL at 21:29

## 2020-02-14 RX ADMIN — OXYCODONE AND ACETAMINOPHEN 2 TABLET(S): 5; 325 TABLET ORAL at 22:11

## 2020-02-14 RX ADMIN — Medication 3 MILLILITER(S): at 13:03

## 2020-02-14 RX ADMIN — Medication 1: at 17:38

## 2020-02-14 RX ADMIN — ENOXAPARIN SODIUM 40 MILLIGRAM(S): 100 INJECTION SUBCUTANEOUS at 13:03

## 2020-02-14 RX ADMIN — OXYCODONE AND ACETAMINOPHEN 2 TABLET(S): 5; 325 TABLET ORAL at 16:32

## 2020-02-14 RX ADMIN — OXYCODONE AND ACETAMINOPHEN 2 TABLET(S): 5; 325 TABLET ORAL at 09:06

## 2020-02-14 RX ADMIN — PANTOPRAZOLE SODIUM 40 MILLIGRAM(S): 20 TABLET, DELAYED RELEASE ORAL at 05:43

## 2020-02-14 RX ADMIN — RISPERIDONE 0.5 MILLIGRAM(S): 4 TABLET ORAL at 21:28

## 2020-02-14 RX ADMIN — OXYCODONE AND ACETAMINOPHEN 2 TABLET(S): 5; 325 TABLET ORAL at 16:02

## 2020-02-14 NOTE — PROGRESS NOTE ADULT - SUBJECTIVE AND OBJECTIVE BOX
Chief complaint  Patient is a 59y old  Male who presents with a chief complaint of 59M w/ n/v/d and hyperglycemia sent in from [facility] (13 Feb 2020 21:55)   Review of systems  Patient in bed, looks comfortable, no hypoglycemia.    Labs and Fingersticks  CAPILLARY BLOOD GLUCOSE      POCT Blood Glucose.: 164 mg/dL (14 Feb 2020 12:19)  POCT Blood Glucose.: 216 mg/dL (14 Feb 2020 08:18)  POCT Blood Glucose.: 211 mg/dL (13 Feb 2020 22:01)  POCT Blood Glucose.: 113 mg/dL (13 Feb 2020 17:23)                        Medications  MEDICATIONS  (STANDING):  albuterol/ipratropium for Nebulization 3 milliLiter(s) Nebulizer every 6 hours  aspirin enteric coated 81 milliGRAM(s) Oral daily  atorvastatin 40 milliGRAM(s) Oral at bedtime  budesonide 160 MICROgram(s)/formoterol 4.5 MICROgram(s) Inhaler 2 Puff(s) Inhalation two times a day  dextrose 5%. 1000 milliLiter(s) (50 mL/Hr) IV Continuous <Continuous>  dextrose 50% Injectable 12.5 Gram(s) IV Push once  dextrose 50% Injectable 25 Gram(s) IV Push once  dextrose 50% Injectable 25 Gram(s) IV Push once  enoxaparin Injectable 40 milliGRAM(s) SubCutaneous daily  furosemide    Tablet 20 milliGRAM(s) Oral daily  insulin glargine Injectable (LANTUS) 75 Unit(s) SubCutaneous at bedtime  insulin lispro (HumaLOG) corrective regimen sliding scale   SubCutaneous three times a day before meals  insulin lispro (HumaLOG) corrective regimen sliding scale   SubCutaneous at bedtime  insulin lispro Injectable (HumaLOG) 30 Unit(s) SubCutaneous three times a day with meals  lisinopril 2.5 milliGRAM(s) Oral daily  melatonin 5 milliGRAM(s) Oral at bedtime  nicotine - Inhaler 1 Each Inhalation daily  nystatin Powder 1 Application(s) Topical two times a day  pantoprazole    Tablet 40 milliGRAM(s) Oral daily  polyethylene glycol 3350 17 Gram(s) Oral every 12 hours  risperiDONE   Tablet 0.5 milliGRAM(s) Oral three times a day  senna 2 Tablet(s) Oral at bedtime  sertraline 50 milliGRAM(s) Oral at bedtime      Physical Exam  General: Patient comfortable in bed  Vital Signs Last 12 Hrs  T(F): 97.6 (02-14-20 @ 05:45), Max: 97.6 (02-14-20 @ 05:45)  HR: 77 (02-14-20 @ 08:32) (77 - 77)  BP: 109/71 (02-14-20 @ 08:32) (100/67 - 109/71)  BP(mean): --  RR: 19 (02-14-20 @ 08:32) (19 - 20)  SpO2: 96% (02-14-20 @ 08:32) (96% - 96%)  Neck: No palpable thyroid nodules.  CVS: S1S2, No murmurs  Respiratory: No wheezing, no crepitations  GI: Abdomen soft, bowel sounds positive  Musculoskeletal:  edema lower extremities.   Skin: No skin rashes, no ecchymosis    Diagnostics Chief complaint  Patient is a 59y old  Male who presents with a chief complaint of 59M w/ n/v/d and hyperglycemia sent in from [facility] (13 Feb 2020 21:55)   Review of systems  Patient in bed, looks comfortable,  no hypoglycemia.    Labs and Fingersticks  CAPILLARY BLOOD GLUCOSE      POCT Blood Glucose.: 164 mg/dL (14 Feb 2020 12:19)  POCT Blood Glucose.: 216 mg/dL (14 Feb 2020 08:18)  POCT Blood Glucose.: 211 mg/dL (13 Feb 2020 22:01)  POCT Blood Glucose.: 113 mg/dL (13 Feb 2020 17:23)    Medications  MEDICATIONS  (STANDING):  albuterol/ipratropium for Nebulization 3 milliLiter(s) Nebulizer every 6 hours  aspirin enteric coated 81 milliGRAM(s) Oral daily  atorvastatin 40 milliGRAM(s) Oral at bedtime  budesonide 160 MICROgram(s)/formoterol 4.5 MICROgram(s) Inhaler 2 Puff(s) Inhalation two times a day  dextrose 5%. 1000 milliLiter(s) (50 mL/Hr) IV Continuous <Continuous>  dextrose 50% Injectable 12.5 Gram(s) IV Push once  dextrose 50% Injectable 25 Gram(s) IV Push once  dextrose 50% Injectable 25 Gram(s) IV Push once  enoxaparin Injectable 40 milliGRAM(s) SubCutaneous daily  furosemide    Tablet 20 milliGRAM(s) Oral daily  insulin glargine Injectable (LANTUS) 75 Unit(s) SubCutaneous at bedtime  insulin lispro (HumaLOG) corrective regimen sliding scale   SubCutaneous three times a day before meals  insulin lispro (HumaLOG) corrective regimen sliding scale   SubCutaneous at bedtime  insulin lispro Injectable (HumaLOG) 30 Unit(s) SubCutaneous three times a day with meals  lisinopril 2.5 milliGRAM(s) Oral daily  melatonin 5 milliGRAM(s) Oral at bedtime  nicotine - Inhaler 1 Each Inhalation daily  nystatin Powder 1 Application(s) Topical two times a day  pantoprazole    Tablet 40 milliGRAM(s) Oral daily  polyethylene glycol 3350 17 Gram(s) Oral every 12 hours  risperiDONE   Tablet 0.5 milliGRAM(s) Oral three times a day  senna 2 Tablet(s) Oral at bedtime  sertraline 50 milliGRAM(s) Oral at bedtime      Physical Exam  General: Patient comfortable in bed  Vital Signs Last 12 Hrs  T(F): 97.6 (02-14-20 @ 05:45), Max: 97.6 (02-14-20 @ 05:45)  HR: 77 (02-14-20 @ 08:32) (77 - 77)  BP: 109/71 (02-14-20 @ 08:32) (100/67 - 109/71)  BP(mean): --  RR: 19 (02-14-20 @ 08:32) (19 - 20)  SpO2: 96% (02-14-20 @ 08:32) (96% - 96%)  Neck: No palpable thyroid nodules.  CVS: S1S2, No murmurs  Respiratory: No wheezing, no crepitations  GI: Abdomen soft, bowel sounds positive  Musculoskeletal:  edema lower extremities.   Skin: No skin rashes, no ecchymosis    Diagnostics

## 2020-02-14 NOTE — PROGRESS NOTE ADULT - SUBJECTIVE AND OBJECTIVE BOX
Patient is a 59y old  Male who presents with a chief complaint of 59M w/ n/v/d and hyperglycemia sent in from [facility] (14 Feb 2020 13:24)      INTERVAL HPI/OVERNIGHT EVENTS:  T(C): 36.4 (02-14-20 @ 20:06), Max: 36.4 (02-14-20 @ 05:45)  HR: 83 (02-14-20 @ 20:06) (76 - 83)  BP: 123/79 (02-14-20 @ 20:06) (100/67 - 123/79)  RR: 20 (02-14-20 @ 20:06) (18 - 20)  SpO2: 98% (02-14-20 @ 20:06) (96% - 98%)  Wt(kg): --  I&O's Summary    13 Feb 2020 07:01  -  14 Feb 2020 07:00  --------------------------------------------------------  IN: 400 mL / OUT: 600 mL / NET: -200 mL    14 Feb 2020 07:01  -  14 Feb 2020 22:20  --------------------------------------------------------  IN: 1260 mL / OUT: 1620 mL / NET: -360 mL        PAST MEDICAL & SURGICAL HISTORY:  Oxygen dependent  Gastroesophageal reflux disease, esophagitis presence not specified  Smoker  Bipolar 1 disorder  Coronary artery disease involving native coronary artery of native heart without angina pectoris  Type 2 diabetes mellitus without complication, with long-term current use of insulin  Pulmonary HTN  HLD (hyperlipidemia)  Stented coronary artery  COPD (chronic obstructive pulmonary disease)  No significant past surgical history      SOCIAL HISTORY  Alcohol:  Tobacco:  Illicit substance use:    FAMILY HISTORY:    REVIEW OF SYSTEMS:  CONSTITUTIONAL: No fever, weight loss, or fatigue  EYES: No eye pain, visual disturbances, or discharge  ENMT:  No difficulty hearing, tinnitus, vertigo; No sinus or throat pain  NECK: No pain or stiffness  RESPIRATORY: No cough, wheezing, chills or hemoptysis; No shortness of breath  CARDIOVASCULAR: No chest pain, palpitations, dizziness, or leg swelling  GASTROINTESTINAL: No abdominal or epigastric pain. No nausea, vomiting, or hematemesis; No diarrhea or constipation. No melena or hematochezia.  GENITOURINARY: No dysuria, frequency, hematuria, or incontinence  NEUROLOGICAL: No headaches, memory loss, loss of strength, numbness, or tremors  SKIN: No itching, burning, rashes, or lesions   LYMPH NODES: No enlarged glands  ENDOCRINE: No heat or cold intolerance; No hair loss  MUSCULOSKELETAL: No joint pain or swelling; No muscle, back, or extremity pain  PSYCHIATRIC: No depression, anxiety, mood swings, or difficulty sleeping  HEME/LYMPH: No easy bruising, or bleeding gums  ALLERY AND IMMUNOLOGIC: No hives or eczema    RADIOLOGY & ADDITIONAL TESTS:    Imaging Personally Reviewed:  [ ] YES  [ ] NO    Consultant(s) Notes Reviewed:  [ ] YES  [ ] NO    PHYSICAL EXAM:  GENERAL: NAD, well-groomed, well-developed  HEAD:  Atraumatic, Normocephalic  EYES: EOMI, PERRLA, conjunctiva and sclera clear  ENMT: No tonsillar erythema, exudates, or enlargement; Moist mucous membranes, Good dentition, No lesions  NECK: Supple, No JVD, Normal thyroid  NERVOUS SYSTEM:  Alert & Oriented X3, Good concentration; Motor Strength 5/5 B/L upper and lower extremities; DTRs 2+ intact and symmetric  CHEST/LUNG: Clear to percussion bilaterally; No rales, rhonchi, wheezing, or rubs  HEART: Regular rate and rhythm; No murmurs, rubs, or gallops  ABDOMEN: Soft, Nontender, Nondistended; Bowel sounds present  EXTREMITIES:  2+ Peripheral Pulses, No clubbing, cyanosis, or edema  LYMPH: No lymphadenopathy noted  SKIN: No rashes or lesions    LABS:              CAPILLARY BLOOD GLUCOSE      POCT Blood Glucose.: 186 mg/dL (14 Feb 2020 21:33)  POCT Blood Glucose.: 195 mg/dL (14 Feb 2020 17:16)  POCT Blood Glucose.: 164 mg/dL (14 Feb 2020 12:19)  POCT Blood Glucose.: 216 mg/dL (14 Feb 2020 08:18)            MEDICATIONS  (STANDING):  albuterol/ipratropium for Nebulization 3 milliLiter(s) Nebulizer every 6 hours  aspirin enteric coated 81 milliGRAM(s) Oral daily  atorvastatin 40 milliGRAM(s) Oral at bedtime  budesonide 160 MICROgram(s)/formoterol 4.5 MICROgram(s) Inhaler 2 Puff(s) Inhalation two times a day  dextrose 5%. 1000 milliLiter(s) (50 mL/Hr) IV Continuous <Continuous>  dextrose 50% Injectable 12.5 Gram(s) IV Push once  dextrose 50% Injectable 25 Gram(s) IV Push once  dextrose 50% Injectable 25 Gram(s) IV Push once  enoxaparin Injectable 40 milliGRAM(s) SubCutaneous daily  furosemide    Tablet 20 milliGRAM(s) Oral daily  insulin glargine Injectable (LANTUS) 75 Unit(s) SubCutaneous at bedtime  insulin lispro (HumaLOG) corrective regimen sliding scale   SubCutaneous three times a day before meals  insulin lispro (HumaLOG) corrective regimen sliding scale   SubCutaneous at bedtime  insulin lispro Injectable (HumaLOG) 30 Unit(s) SubCutaneous three times a day with meals  lisinopril 2.5 milliGRAM(s) Oral daily  melatonin 5 milliGRAM(s) Oral at bedtime  nicotine - Inhaler 1 Each Inhalation daily  nystatin Powder 1 Application(s) Topical two times a day  pantoprazole    Tablet 40 milliGRAM(s) Oral daily  polyethylene glycol 3350 17 Gram(s) Oral every 12 hours  risperiDONE   Tablet 0.5 milliGRAM(s) Oral three times a day  senna 2 Tablet(s) Oral at bedtime  sertraline 50 milliGRAM(s) Oral at bedtime    MEDICATIONS  (PRN):  acetaminophen   Tablet .. 650 milliGRAM(s) Oral every 6 hours PRN Mild Pain (1 - 3)  aluminum hydroxide/magnesium hydroxide/simethicone Suspension 30 milliLiter(s) Oral every 4 hours PRN Dyspepsia  bisacodyl 5 milliGRAM(s) Oral every 12 hours PRN Constipation  dextrose 40% Gel 15 Gram(s) Oral once PRN Blood Glucose LESS THAN 70 milliGRAM(s)/deciliter  glucagon  Injectable 1 milliGRAM(s) IntraMuscular once PRN Glucose LESS THAN 70 milligrams/deciliter  oxycodone    5 mG/acetaminophen 325 mG 2 Tablet(s) Oral every 6 hours PRN Moderate Pain (4 - 6)      Care Discussed with Consultants/Other Providers [ ] YES  [ ] NO

## 2020-02-14 NOTE — PROGRESS NOTE ADULT - ASSESSMENT
Assessment  DMT2: 59y Male with DM T2 with hyperglycemia, A1C 10.4%, on basal bolus insulin, blood sugars improving, FS trending within overall acceptable range, no hypoglycemic episodes. Patient is eating full meals with good appetite, alert, resting comfortably, awaiting placement to shelter now.  CAD: on medications, no chest pain, stable, monitored.  HTN: Controlled,  on antihypertensive medications.  Overweight/Obesity: No strict exercise routines, not on any weight loss program, neither on low  calorie diet.          Laury Romero MD  Cell: [phone]  Office: [phone] Consent (Scalp)/Introductory Paragraph: The rationale for Mohs was explained to the patient and consent was obtained. The risks, benefits and alternatives to therapy were discussed in detail. Specifically, the risks of changes in hair growth pattern secondary to repair, infection, scarring, bleeding, prolonged wound healing, incomplete removal, allergy to anesthesia, nerve injury and recurrence were addressed. Prior to the procedure, the treatment site was clearly identified and confirmed by the patient. All components of Universal Protocol/PAUSE Rule completed.

## 2020-02-14 NOTE — PROGRESS NOTE ADULT - ASSESSMENT
Assessment  DMT2: 59y Male with DM T2 with hyperglycemia, A1C 10.4%, on basal bolus insulin, blood sugars fluctuating, FS trending within overall acceptable range with intermittent elevations, no hypoglycemic episodes. Patient seen this afternoon, resting comfortably on NC, eating full meals with good appetite. Still waiting placement to shelter.   CAD: on medications, no chest pain, stable, monitored.  HTN: Controlled,  on antihypertensive medications.  Overweight/Obesity: No strict exercise routines, not on any weight loss program, neither on low  calorie diet.          Laury Romero MD  Cell: [phone]  Office: [phone] Assessment  DMT2: 59y Male with DM T2 with hyperglycemia, A1C 10.4%, on basal bolus insulin, blood sugars fluctuating, FS trending within overall acceptable range with intermittent elevations, no  hypoglycemic episodes. Patient seen this afternoon, resting comfortably on NC, eating full meals with good appetite. Still waiting placement to shelter.   CAD: on medications, no chest pain, stable, monitored.  HTN: Controlled,  on antihypertensive medications.  Overweight/Obesity: No strict exercise routines, not on any weight loss program, neither on low  calorie diet.          Laury Romero MD  Cell: [phone]  Office: [phone]

## 2020-02-15 LAB
GLUCOSE BLDC GLUCOMTR-MCNC: 162 MG/DL — HIGH (ref 70–99)
GLUCOSE BLDC GLUCOMTR-MCNC: 163 MG/DL — HIGH (ref 70–99)
GLUCOSE BLDC GLUCOMTR-MCNC: 175 MG/DL — HIGH (ref 70–99)
GLUCOSE BLDC GLUCOMTR-MCNC: 240 MG/DL — HIGH (ref 70–99)

## 2020-02-15 RX ADMIN — Medication 20 MILLIGRAM(S): at 05:56

## 2020-02-15 RX ADMIN — Medication 30 UNIT(S): at 17:10

## 2020-02-15 RX ADMIN — RISPERIDONE 0.5 MILLIGRAM(S): 4 TABLET ORAL at 21:17

## 2020-02-15 RX ADMIN — OXYCODONE AND ACETAMINOPHEN 2 TABLET(S): 5; 325 TABLET ORAL at 05:55

## 2020-02-15 RX ADMIN — ATORVASTATIN CALCIUM 40 MILLIGRAM(S): 80 TABLET, FILM COATED ORAL at 21:17

## 2020-02-15 RX ADMIN — SERTRALINE 50 MILLIGRAM(S): 25 TABLET, FILM COATED ORAL at 21:17

## 2020-02-15 RX ADMIN — BUDESONIDE AND FORMOTEROL FUMARATE DIHYDRATE 2 PUFF(S): 160; 4.5 AEROSOL RESPIRATORY (INHALATION) at 05:56

## 2020-02-15 RX ADMIN — OXYCODONE AND ACETAMINOPHEN 2 TABLET(S): 5; 325 TABLET ORAL at 06:35

## 2020-02-15 RX ADMIN — Medication 650 MILLIGRAM(S): at 08:47

## 2020-02-15 RX ADMIN — Medication 5 MILLIGRAM(S): at 21:17

## 2020-02-15 RX ADMIN — NYSTATIN CREAM 1 APPLICATION(S): 100000 CREAM TOPICAL at 05:56

## 2020-02-15 RX ADMIN — LISINOPRIL 2.5 MILLIGRAM(S): 2.5 TABLET ORAL at 05:56

## 2020-02-15 RX ADMIN — Medication 650 MILLIGRAM(S): at 22:39

## 2020-02-15 RX ADMIN — Medication 3 MILLILITER(S): at 12:35

## 2020-02-15 RX ADMIN — BUDESONIDE AND FORMOTEROL FUMARATE DIHYDRATE 2 PUFF(S): 160; 4.5 AEROSOL RESPIRATORY (INHALATION) at 17:11

## 2020-02-15 RX ADMIN — Medication 1: at 08:19

## 2020-02-15 RX ADMIN — Medication 3 MILLILITER(S): at 17:10

## 2020-02-15 RX ADMIN — Medication 650 MILLIGRAM(S): at 07:47

## 2020-02-15 RX ADMIN — Medication 1: at 12:16

## 2020-02-15 RX ADMIN — ENOXAPARIN SODIUM 40 MILLIGRAM(S): 100 INJECTION SUBCUTANEOUS at 12:35

## 2020-02-15 RX ADMIN — OXYCODONE AND ACETAMINOPHEN 2 TABLET(S): 5; 325 TABLET ORAL at 20:28

## 2020-02-15 RX ADMIN — INSULIN GLARGINE 75 UNIT(S): 100 INJECTION, SOLUTION SUBCUTANEOUS at 22:38

## 2020-02-15 RX ADMIN — OXYCODONE AND ACETAMINOPHEN 2 TABLET(S): 5; 325 TABLET ORAL at 12:35

## 2020-02-15 RX ADMIN — Medication 3 MILLILITER(S): at 05:56

## 2020-02-15 RX ADMIN — Medication 1: at 17:10

## 2020-02-15 RX ADMIN — NYSTATIN CREAM 1 APPLICATION(S): 100000 CREAM TOPICAL at 21:17

## 2020-02-15 RX ADMIN — RISPERIDONE 0.5 MILLIGRAM(S): 4 TABLET ORAL at 05:56

## 2020-02-15 RX ADMIN — SENNA PLUS 2 TABLET(S): 8.6 TABLET ORAL at 21:17

## 2020-02-15 RX ADMIN — Medication 30 UNIT(S): at 12:15

## 2020-02-15 RX ADMIN — OXYCODONE AND ACETAMINOPHEN 2 TABLET(S): 5; 325 TABLET ORAL at 13:35

## 2020-02-15 RX ADMIN — Medication 650 MILLIGRAM(S): at 23:09

## 2020-02-15 RX ADMIN — Medication 650 MILLIGRAM(S): at 14:01

## 2020-02-15 RX ADMIN — PANTOPRAZOLE SODIUM 40 MILLIGRAM(S): 20 TABLET, DELAYED RELEASE ORAL at 05:56

## 2020-02-15 RX ADMIN — Medication 650 MILLIGRAM(S): at 15:00

## 2020-02-15 RX ADMIN — Medication 81 MILLIGRAM(S): at 12:35

## 2020-02-15 RX ADMIN — RISPERIDONE 0.5 MILLIGRAM(S): 4 TABLET ORAL at 14:00

## 2020-02-15 RX ADMIN — Medication 30 UNIT(S): at 08:19

## 2020-02-15 RX ADMIN — OXYCODONE AND ACETAMINOPHEN 2 TABLET(S): 5; 325 TABLET ORAL at 19:58

## 2020-02-15 NOTE — PROGRESS NOTE ADULT - SUBJECTIVE AND OBJECTIVE BOX
Patient is a 59y old  Male who presents with a chief complaint of 59M w/ n/v/d and hyperglycemia sent in from [facility] (15 Feb 2020 14:18)    pt. seen and examined   no c/o    INTERVAL HPI/OVERNIGHT EVENTS:  T(C): 36.4 (02-15-20 @ 16:02), Max: 36.7 (02-15-20 @ 04:30)  HR: 85 (02-15-20 @ 16:02) (73 - 85)  BP: 105/71 (02-15-20 @ 16:02) (100/55 - 123/79)  RR: 20 (02-15-20 @ 16:02) (18 - 20)  SpO2: 95% (02-15-20 @ 16:02) (95% - 98%)  Wt(kg): --  I&O's Summary    14 Feb 2020 07:01  -  15 Feb 2020 07:00  --------------------------------------------------------  IN: 1860 mL / OUT: 1620 mL / NET: 240 mL    15 Feb 2020 07:01  -  15 Feb 2020 19:41  --------------------------------------------------------  IN: 1020 mL / OUT: 600 mL / NET: 420 mL        PAST MEDICAL & SURGICAL HISTORY:  Oxygen dependent  Gastroesophageal reflux disease, esophagitis presence not specified  Smoker  Bipolar 1 disorder  Coronary artery disease involving native coronary artery of native heart without angina pectoris  Type 2 diabetes mellitus without complication, with long-term current use of insulin  Pulmonary HTN  HLD (hyperlipidemia)  Stented coronary artery  COPD (chronic obstructive pulmonary disease)  No significant past surgical history      SOCIAL HISTORY  Alcohol:  Tobacco:  Illicit substance use:    FAMILY HISTORY:    REVIEW OF SYSTEMS:  CONSTITUTIONAL: No fever, weight loss, or fatigue  EYES: No eye pain, visual disturbances, or discharge  ENMT:  No difficulty hearing, tinnitus, vertigo; No sinus or throat pain  NECK: No pain or stiffness  RESPIRATORY: No cough, wheezing, chills or hemoptysis; No shortness of breath  CARDIOVASCULAR: No chest pain, palpitations, dizziness, or leg swelling  GASTROINTESTINAL: No abdominal or epigastric pain. No nausea, vomiting, or hematemesis; No diarrhea or constipation. No melena or hematochezia.  GENITOURINARY: No dysuria, frequency, hematuria, or incontinence  NEUROLOGICAL: No headaches, memory loss, loss of strength, numbness, or tremors  SKIN: No itching, burning, rashes, or lesions   LYMPH NODES: No enlarged glands  ENDOCRINE: No heat or cold intolerance; No hair loss  MUSCULOSKELETAL: No joint pain or swelling; No muscle, back, or extremity pain  PSYCHIATRIC: No depression, anxiety, mood swings, or difficulty sleeping  HEME/LYMPH: No easy bruising, or bleeding gums  ALLERY AND IMMUNOLOGIC: No hives or eczema    RADIOLOGY & ADDITIONAL TESTS:    Imaging Personally Reviewed:  [ ] YES  [ ] NO    Consultant(s) Notes Reviewed:  [ ] YES  [ ] NO    PHYSICAL EXAM:  GENERAL: NAD, well-groomed, well-developed  HEAD:  Atraumatic, Normocephalic  EYES: EOMI, PERRLA, conjunctiva and sclera clear  ENMT: No tonsillar erythema, exudates, or enlargement; Moist mucous membranes, Good dentition, No lesions  NECK: Supple, No JVD, Normal thyroid  NERVOUS SYSTEM:  Alert & Oriented X3, Good concentration; Motor Strength 5/5 B/L upper and lower extremities; DTRs 2+ intact and symmetric  CHEST/LUNG: Clear to percussion bilaterally; No rales, rhonchi, wheezing, or rubs  HEART: Regular rate and rhythm; No murmurs, rubs, or gallops  ABDOMEN: Soft, Nontender, Nondistended; Bowel sounds present  EXTREMITIES:  2+ Peripheral Pulses, No clubbing, cyanosis, or edema  LYMPH: No lymphadenopathy noted  SKIN: No rashes or lesions    LABS:              CAPILLARY BLOOD GLUCOSE      POCT Blood Glucose.: 162 mg/dL (15 Feb 2020 17:01)  POCT Blood Glucose.: 163 mg/dL (15 Feb 2020 11:54)  POCT Blood Glucose.: 175 mg/dL (15 Feb 2020 08:00)  POCT Blood Glucose.: 186 mg/dL (14 Feb 2020 21:33)            MEDICATIONS  (STANDING):  albuterol/ipratropium for Nebulization 3 milliLiter(s) Nebulizer every 6 hours  aspirin enteric coated 81 milliGRAM(s) Oral daily  atorvastatin 40 milliGRAM(s) Oral at bedtime  budesonide 160 MICROgram(s)/formoterol 4.5 MICROgram(s) Inhaler 2 Puff(s) Inhalation two times a day  dextrose 5%. 1000 milliLiter(s) (50 mL/Hr) IV Continuous <Continuous>  dextrose 50% Injectable 12.5 Gram(s) IV Push once  dextrose 50% Injectable 25 Gram(s) IV Push once  dextrose 50% Injectable 25 Gram(s) IV Push once  enoxaparin Injectable 40 milliGRAM(s) SubCutaneous daily  furosemide    Tablet 20 milliGRAM(s) Oral daily  insulin glargine Injectable (LANTUS) 75 Unit(s) SubCutaneous at bedtime  insulin lispro (HumaLOG) corrective regimen sliding scale   SubCutaneous three times a day before meals  insulin lispro (HumaLOG) corrective regimen sliding scale   SubCutaneous at bedtime  insulin lispro Injectable (HumaLOG) 30 Unit(s) SubCutaneous three times a day with meals  lisinopril 2.5 milliGRAM(s) Oral daily  melatonin 5 milliGRAM(s) Oral at bedtime  nicotine - Inhaler 1 Each Inhalation daily  nystatin Powder 1 Application(s) Topical two times a day  pantoprazole    Tablet 40 milliGRAM(s) Oral daily  polyethylene glycol 3350 17 Gram(s) Oral every 12 hours  risperiDONE   Tablet 0.5 milliGRAM(s) Oral three times a day  senna 2 Tablet(s) Oral at bedtime  sertraline 50 milliGRAM(s) Oral at bedtime    MEDICATIONS  (PRN):  acetaminophen   Tablet .. 650 milliGRAM(s) Oral every 6 hours PRN Mild Pain (1 - 3)  aluminum hydroxide/magnesium hydroxide/simethicone Suspension 30 milliLiter(s) Oral every 4 hours PRN Dyspepsia  bisacodyl 5 milliGRAM(s) Oral every 12 hours PRN Constipation  dextrose 40% Gel 15 Gram(s) Oral once PRN Blood Glucose LESS THAN 70 milliGRAM(s)/deciliter  glucagon  Injectable 1 milliGRAM(s) IntraMuscular once PRN Glucose LESS THAN 70 milligrams/deciliter  oxycodone    5 mG/acetaminophen 325 mG 2 Tablet(s) Oral every 6 hours PRN Moderate Pain (4 - 6)      Care Discussed with Consultants/Other Providers [ ] YES  [ ] NO

## 2020-02-15 NOTE — PROGRESS NOTE ADULT - SUBJECTIVE AND OBJECTIVE BOX
Chief complaint  Patient is a 59y old  Male who presents with a chief complaint of 59M w/ n/v/d and hyperglycemia sent in from  (14 Feb 2020 22:20)   Review of systems  Patient in bed, looks comfortable, no fever, no hypoglycemia.    Labs and Fingersticks  CAPILLARY BLOOD GLUCOSE      POCT Blood Glucose.: 163 mg/dL (15 Feb 2020 11:54)  POCT Blood Glucose.: 175 mg/dL (15 Feb 2020 08:00)  POCT Blood Glucose.: 186 mg/dL (14 Feb 2020 21:33)  POCT Blood Glucose.: 195 mg/dL (14 Feb 2020 17:16)      Medications  MEDICATIONS  (STANDING):  albuterol/ipratropium for Nebulization 3 milliLiter(s) Nebulizer every 6 hours  aspirin enteric coated 81 milliGRAM(s) Oral daily  atorvastatin 40 milliGRAM(s) Oral at bedtime  budesonide 160 MICROgram(s)/formoterol 4.5 MICROgram(s) Inhaler 2 Puff(s) Inhalation two times a day  dextrose 5%. 1000 milliLiter(s) (50 mL/Hr) IV Continuous <Continuous>  dextrose 50% Injectable 12.5 Gram(s) IV Push once  dextrose 50% Injectable 25 Gram(s) IV Push once  dextrose 50% Injectable 25 Gram(s) IV Push once  enoxaparin Injectable 40 milliGRAM(s) SubCutaneous daily  furosemide    Tablet 20 milliGRAM(s) Oral daily  insulin glargine Injectable (LANTUS) 75 Unit(s) SubCutaneous at bedtime  insulin lispro (HumaLOG) corrective regimen sliding scale   SubCutaneous three times a day before meals  insulin lispro (HumaLOG) corrective regimen sliding scale   SubCutaneous at bedtime  insulin lispro Injectable (HumaLOG) 30 Unit(s) SubCutaneous three times a day with meals  lisinopril 2.5 milliGRAM(s) Oral daily  melatonin 5 milliGRAM(s) Oral at bedtime  nicotine - Inhaler 1 Each Inhalation daily  nystatin Powder 1 Application(s) Topical two times a day  pantoprazole    Tablet 40 milliGRAM(s) Oral daily  polyethylene glycol 3350 17 Gram(s) Oral every 12 hours  risperiDONE   Tablet 0.5 milliGRAM(s) Oral three times a day  senna 2 Tablet(s) Oral at bedtime  sertraline 50 milliGRAM(s) Oral at bedtime      Physical Exam  General: Patient comfortable in bed  Vital Signs Last 12 Hrs  T(F): 97.4 (02-15-20 @ 12:37), Max: 98.1 (02-15-20 @ 04:30)  HR: 76 (02-15-20 @ 12:37) (73 - 76)  BP: 117/77 (02-15-20 @ 12:37) (100/55 - 117/77)  BP(mean): --  RR: 18 (02-15-20 @ 12:37) (18 - 20)  SpO2: 97% (02-15-20 @ 12:37) (96% - 97%)  Neck: No palpable thyroid nodules.  CVS: S1S2, No murmurs  Respiratory: No wheezing, no crepitations  GI: Abdomen soft, bowel sounds positive  Musculoskeletal:  edema lower extremities.   Skin: No skin rashes, no ecchymosis    Diagnostics

## 2020-02-15 NOTE — PROGRESS NOTE ADULT - ASSESSMENT
Assessment  DMT2: 59y Male with DM T2 with hyperglycemia, A1C 10.4%, on basal bolus insulin, blood sugars trending down, no  hypoglycemic episodes. Patient seen this afternoon, resting comfortably on NC, eating full meals with good appetite. Still waiting placement to shelter.   CAD: on medications, no chest pain, stable, monitored.  HTN: Controlled,  on antihypertensive medications.  Overweight/Obesity: No strict exercise routines, not on any weight loss program, neither on low  calorie diet.          Laury Romero MD  Cell: [phone]  Office: [phone]

## 2020-02-16 LAB
GLUCOSE BLDC GLUCOMTR-MCNC: 168 MG/DL — HIGH (ref 70–99)
GLUCOSE BLDC GLUCOMTR-MCNC: 183 MG/DL — HIGH (ref 70–99)
GLUCOSE BLDC GLUCOMTR-MCNC: 190 MG/DL — HIGH (ref 70–99)
GLUCOSE BLDC GLUCOMTR-MCNC: 245 MG/DL — HIGH (ref 70–99)

## 2020-02-16 RX ADMIN — ATORVASTATIN CALCIUM 40 MILLIGRAM(S): 80 TABLET, FILM COATED ORAL at 21:10

## 2020-02-16 RX ADMIN — Medication 1: at 17:24

## 2020-02-16 RX ADMIN — Medication 3 MILLILITER(S): at 17:23

## 2020-02-16 RX ADMIN — Medication 650 MILLIGRAM(S): at 08:52

## 2020-02-16 RX ADMIN — RISPERIDONE 0.5 MILLIGRAM(S): 4 TABLET ORAL at 05:18

## 2020-02-16 RX ADMIN — Medication 3 MILLILITER(S): at 12:04

## 2020-02-16 RX ADMIN — BUDESONIDE AND FORMOTEROL FUMARATE DIHYDRATE 2 PUFF(S): 160; 4.5 AEROSOL RESPIRATORY (INHALATION) at 17:23

## 2020-02-16 RX ADMIN — OXYCODONE AND ACETAMINOPHEN 2 TABLET(S): 5; 325 TABLET ORAL at 20:17

## 2020-02-16 RX ADMIN — Medication 81 MILLIGRAM(S): at 13:05

## 2020-02-16 RX ADMIN — Medication 650 MILLIGRAM(S): at 14:58

## 2020-02-16 RX ADMIN — OXYCODONE AND ACETAMINOPHEN 2 TABLET(S): 5; 325 TABLET ORAL at 05:50

## 2020-02-16 RX ADMIN — NYSTATIN CREAM 1 APPLICATION(S): 100000 CREAM TOPICAL at 05:18

## 2020-02-16 RX ADMIN — BUDESONIDE AND FORMOTEROL FUMARATE DIHYDRATE 2 PUFF(S): 160; 4.5 AEROSOL RESPIRATORY (INHALATION) at 05:18

## 2020-02-16 RX ADMIN — Medication 1: at 13:04

## 2020-02-16 RX ADMIN — RISPERIDONE 0.5 MILLIGRAM(S): 4 TABLET ORAL at 21:10

## 2020-02-16 RX ADMIN — Medication 1: at 08:47

## 2020-02-16 RX ADMIN — SENNA PLUS 2 TABLET(S): 8.6 TABLET ORAL at 21:10

## 2020-02-16 RX ADMIN — OXYCODONE AND ACETAMINOPHEN 2 TABLET(S): 5; 325 TABLET ORAL at 05:20

## 2020-02-16 RX ADMIN — Medication 650 MILLIGRAM(S): at 09:30

## 2020-02-16 RX ADMIN — ENOXAPARIN SODIUM 40 MILLIGRAM(S): 100 INJECTION SUBCUTANEOUS at 13:05

## 2020-02-16 RX ADMIN — Medication 3 MILLILITER(S): at 00:00

## 2020-02-16 RX ADMIN — RISPERIDONE 0.5 MILLIGRAM(S): 4 TABLET ORAL at 13:06

## 2020-02-16 RX ADMIN — Medication 30 UNIT(S): at 17:24

## 2020-02-16 RX ADMIN — Medication 20 MILLIGRAM(S): at 05:18

## 2020-02-16 RX ADMIN — Medication 3 MILLILITER(S): at 05:18

## 2020-02-16 RX ADMIN — OXYCODONE AND ACETAMINOPHEN 2 TABLET(S): 5; 325 TABLET ORAL at 13:40

## 2020-02-16 RX ADMIN — Medication 30 UNIT(S): at 08:46

## 2020-02-16 RX ADMIN — LISINOPRIL 2.5 MILLIGRAM(S): 2.5 TABLET ORAL at 05:18

## 2020-02-16 RX ADMIN — INSULIN GLARGINE 75 UNIT(S): 100 INJECTION, SOLUTION SUBCUTANEOUS at 22:02

## 2020-02-16 RX ADMIN — Medication 30 UNIT(S): at 13:04

## 2020-02-16 RX ADMIN — Medication 5 MILLIGRAM(S): at 21:10

## 2020-02-16 RX ADMIN — SERTRALINE 50 MILLIGRAM(S): 25 TABLET, FILM COATED ORAL at 21:11

## 2020-02-16 RX ADMIN — Medication 1 EACH: at 13:05

## 2020-02-16 RX ADMIN — OXYCODONE AND ACETAMINOPHEN 2 TABLET(S): 5; 325 TABLET ORAL at 13:08

## 2020-02-16 RX ADMIN — PANTOPRAZOLE SODIUM 40 MILLIGRAM(S): 20 TABLET, DELAYED RELEASE ORAL at 05:18

## 2020-02-16 RX ADMIN — OXYCODONE AND ACETAMINOPHEN 2 TABLET(S): 5; 325 TABLET ORAL at 20:59

## 2020-02-16 RX ADMIN — Medication 650 MILLIGRAM(S): at 15:33

## 2020-02-16 RX ADMIN — NYSTATIN CREAM 1 APPLICATION(S): 100000 CREAM TOPICAL at 21:09

## 2020-02-16 NOTE — PROGRESS NOTE ADULT - ASSESSMENT
Assessment  DMT2: 59y Male with DM T2 with hyperglycemia, A1C 10.4%, on basal bolus insulin, blood sugars trending down, no  hypoglycemic episodes. Patient is eating full meals and ambulating, waiting placement to shelter.   CAD: on medications, no chest pain, stable, monitored.  HTN: Controlled,  on antihypertensive medications.  Overweight/Obesity: No strict exercise routines, not on any weight loss program, neither on low  calorie diet.          Laury Romero MD  Cell: [phone]  Office: [phone]

## 2020-02-16 NOTE — PROGRESS NOTE ADULT - SUBJECTIVE AND OBJECTIVE BOX
Chief complaint  Patient is a 59y old  Male who presents with a chief complaint of 59M w/ n/v/d and hyperglycemia sent in from [facility] (15 Feb 2020 19:41)   Review of systems  Patient in bed, looks comfortable, no fever, no hypoglycemia.    Labs and Fingersticks  CAPILLARY BLOOD GLUCOSE      POCT Blood Glucose.: 168 mg/dL (16 Feb 2020 12:55)  POCT Blood Glucose.: 183 mg/dL (16 Feb 2020 08:26)  POCT Blood Glucose.: 240 mg/dL (15 Feb 2020 22:11)  POCT Blood Glucose.: 162 mg/dL (15 Feb 2020 17:01)      Medications  MEDICATIONS  (STANDING):  albuterol/ipratropium for Nebulization 3 milliLiter(s) Nebulizer every 6 hours  aspirin enteric coated 81 milliGRAM(s) Oral daily  atorvastatin 40 milliGRAM(s) Oral at bedtime  budesonide 160 MICROgram(s)/formoterol 4.5 MICROgram(s) Inhaler 2 Puff(s) Inhalation two times a day  dextrose 5%. 1000 milliLiter(s) (50 mL/Hr) IV Continuous <Continuous>  dextrose 50% Injectable 12.5 Gram(s) IV Push once  dextrose 50% Injectable 25 Gram(s) IV Push once  dextrose 50% Injectable 25 Gram(s) IV Push once  enoxaparin Injectable 40 milliGRAM(s) SubCutaneous daily  furosemide    Tablet 20 milliGRAM(s) Oral daily  insulin glargine Injectable (LANTUS) 75 Unit(s) SubCutaneous at bedtime  insulin lispro (HumaLOG) corrective regimen sliding scale   SubCutaneous three times a day before meals  insulin lispro (HumaLOG) corrective regimen sliding scale   SubCutaneous at bedtime  insulin lispro Injectable (HumaLOG) 30 Unit(s) SubCutaneous three times a day with meals  lisinopril 2.5 milliGRAM(s) Oral daily  melatonin 5 milliGRAM(s) Oral at bedtime  nicotine - Inhaler 1 Each Inhalation daily  nystatin Powder 1 Application(s) Topical two times a day  pantoprazole    Tablet 40 milliGRAM(s) Oral daily  polyethylene glycol 3350 17 Gram(s) Oral every 12 hours  risperiDONE   Tablet 0.5 milliGRAM(s) Oral three times a day  senna 2 Tablet(s) Oral at bedtime  sertraline 50 milliGRAM(s) Oral at bedtime      Physical Exam  General: Patient comfortable in bed  Vital Signs Last 12 Hrs  T(F): 98.1 (02-16-20 @ 13:57), Max: 98.1 (02-16-20 @ 13:57)  HR: 78 (02-16-20 @ 13:57) (78 - 78)  BP: 118/83 (02-16-20 @ 13:57) (118/83 - 118/83)  BP(mean): --  RR: 18 (02-16-20 @ 13:57) (18 - 18)  SpO2: 97% (02-16-20 @ 13:57) (97% - 97%)  Neck: No palpable thyroid nodules.  CVS: S1S2, No murmurs  Respiratory: No wheezing, no crepitations  GI: Abdomen soft, bowel sounds positive  Musculoskeletal:  edema lower extremities.   Skin: No skin rashes, no ecchymosis    Diagnostics

## 2020-02-16 NOTE — PROGRESS NOTE ADULT - SUBJECTIVE AND OBJECTIVE BOX
Patient is a 59y old  Male who presents with a chief complaint of 59M w/ n/v/d and hyperglycemia sent in from [facility] (16 Feb 2020 16:32)      INTERVAL HPI/OVERNIGHT EVENTS:  T(C): 36.5 (02-16-20 @ 20:43), Max: 36.7 (02-16-20 @ 04:06)  HR: 85 (02-16-20 @ 20:43) (72 - 85)  BP: 118/67 (02-16-20 @ 20:43) (104/67 - 118/83)  RR: 18 (02-16-20 @ 20:43) (18 - 20)  SpO2: 97% (02-16-20 @ 20:43) (96% - 97%)  Wt(kg): --  I&O's Summary    15 Feb 2020 07:01  -  16 Feb 2020 07:00  --------------------------------------------------------  IN: 1020 mL / OUT: 1825 mL / NET: -805 mL    16 Feb 2020 07:01  -  16 Feb 2020 23:58  --------------------------------------------------------  IN: 1440 mL / OUT: 1800 mL / NET: -360 mL        PAST MEDICAL & SURGICAL HISTORY:  Oxygen dependent  Gastroesophageal reflux disease, esophagitis presence not specified  Smoker  Bipolar 1 disorder  Coronary artery disease involving native coronary artery of native heart without angina pectoris  Type 2 diabetes mellitus without complication, with long-term current use of insulin  Pulmonary HTN  HLD (hyperlipidemia)  Stented coronary artery  COPD (chronic obstructive pulmonary disease)  No significant past surgical history      SOCIAL HISTORY  Alcohol:  Tobacco:  Illicit substance use:    FAMILY HISTORY:    REVIEW OF SYSTEMS:  CONSTITUTIONAL: No fever, weight loss, or fatigue  EYES: No eye pain, visual disturbances, or discharge  ENMT:  No difficulty hearing, tinnitus, vertigo; No sinus or throat pain  NECK: No pain or stiffness  RESPIRATORY: No cough, wheezing, chills or hemoptysis; No shortness of breath  CARDIOVASCULAR: No chest pain, palpitations, dizziness, or leg swelling  GASTROINTESTINAL: No abdominal or epigastric pain. No nausea, vomiting, or hematemesis; No diarrhea or constipation. No melena or hematochezia.  GENITOURINARY: No dysuria, frequency, hematuria, or incontinence  NEUROLOGICAL: No headaches, memory loss, loss of strength, numbness, or tremors  SKIN: No itching, burning, rashes, or lesions   LYMPH NODES: No enlarged glands  ENDOCRINE: No heat or cold intolerance; No hair loss  MUSCULOSKELETAL: No joint pain or swelling; No muscle, back, or extremity pain  PSYCHIATRIC: No depression, anxiety, mood swings, or difficulty sleeping  HEME/LYMPH: No easy bruising, or bleeding gums  ALLERY AND IMMUNOLOGIC: No hives or eczema    RADIOLOGY & ADDITIONAL TESTS:    Imaging Personally Reviewed:  [ ] YES  [ ] NO    Consultant(s) Notes Reviewed:  [ ] YES  [ ] NO    PHYSICAL EXAM:  GENERAL: NAD, well-groomed, well-developed  HEAD:  Atraumatic, Normocephalic  EYES: EOMI, PERRLA, conjunctiva and sclera clear  ENMT: No tonsillar erythema, exudates, or enlargement; Moist mucous membranes, Good dentition, No lesions  NECK: Supple, No JVD, Normal thyroid  NERVOUS SYSTEM:  Alert & Oriented X3, Good concentration; Motor Strength 5/5 B/L upper and lower extremities; DTRs 2+ intact and symmetric  CHEST/LUNG: Clear to percussion bilaterally; No rales, rhonchi, wheezing, or rubs  HEART: Regular rate and rhythm; No murmurs, rubs, or gallops  ABDOMEN: Soft, Nontender, Nondistended; Bowel sounds present  EXTREMITIES:  2+ Peripheral Pulses, No clubbing, cyanosis, or edema  LYMPH: No lymphadenopathy noted  SKIN: No rashes or lesions    LABS:              CAPILLARY BLOOD GLUCOSE      POCT Blood Glucose.: 245 mg/dL (16 Feb 2020 21:58)  POCT Blood Glucose.: 190 mg/dL (16 Feb 2020 17:22)  POCT Blood Glucose.: 168 mg/dL (16 Feb 2020 12:55)  POCT Blood Glucose.: 183 mg/dL (16 Feb 2020 08:26)            MEDICATIONS  (STANDING):  albuterol/ipratropium for Nebulization 3 milliLiter(s) Nebulizer every 6 hours  aspirin enteric coated 81 milliGRAM(s) Oral daily  atorvastatin 40 milliGRAM(s) Oral at bedtime  budesonide 160 MICROgram(s)/formoterol 4.5 MICROgram(s) Inhaler 2 Puff(s) Inhalation two times a day  dextrose 5%. 1000 milliLiter(s) (50 mL/Hr) IV Continuous <Continuous>  dextrose 50% Injectable 12.5 Gram(s) IV Push once  dextrose 50% Injectable 25 Gram(s) IV Push once  dextrose 50% Injectable 25 Gram(s) IV Push once  enoxaparin Injectable 40 milliGRAM(s) SubCutaneous daily  furosemide    Tablet 20 milliGRAM(s) Oral daily  insulin glargine Injectable (LANTUS) 75 Unit(s) SubCutaneous at bedtime  insulin lispro (HumaLOG) corrective regimen sliding scale   SubCutaneous three times a day before meals  insulin lispro (HumaLOG) corrective regimen sliding scale   SubCutaneous at bedtime  insulin lispro Injectable (HumaLOG) 30 Unit(s) SubCutaneous three times a day with meals  lisinopril 2.5 milliGRAM(s) Oral daily  melatonin 5 milliGRAM(s) Oral at bedtime  nicotine - Inhaler 1 Each Inhalation daily  nystatin Powder 1 Application(s) Topical two times a day  pantoprazole    Tablet 40 milliGRAM(s) Oral daily  polyethylene glycol 3350 17 Gram(s) Oral every 12 hours  risperiDONE   Tablet 0.5 milliGRAM(s) Oral three times a day  senna 2 Tablet(s) Oral at bedtime  sertraline 50 milliGRAM(s) Oral at bedtime    MEDICATIONS  (PRN):  acetaminophen   Tablet .. 650 milliGRAM(s) Oral every 6 hours PRN Mild Pain (1 - 3)  aluminum hydroxide/magnesium hydroxide/simethicone Suspension 30 milliLiter(s) Oral every 4 hours PRN Dyspepsia  bisacodyl 5 milliGRAM(s) Oral every 12 hours PRN Constipation  dextrose 40% Gel 15 Gram(s) Oral once PRN Blood Glucose LESS THAN 70 milliGRAM(s)/deciliter  glucagon  Injectable 1 milliGRAM(s) IntraMuscular once PRN Glucose LESS THAN 70 milligrams/deciliter  oxycodone    5 mG/acetaminophen 325 mG 2 Tablet(s) Oral every 6 hours PRN Moderate Pain (4 - 6)      Care Discussed with Consultants/Other Providers [ ] YES  [ ] NO

## 2020-02-17 LAB
GLUCOSE BLDC GLUCOMTR-MCNC: 178 MG/DL — HIGH (ref 70–99)
GLUCOSE BLDC GLUCOMTR-MCNC: 224 MG/DL — HIGH (ref 70–99)
GLUCOSE BLDC GLUCOMTR-MCNC: 248 MG/DL — HIGH (ref 70–99)
GLUCOSE BLDC GLUCOMTR-MCNC: 285 MG/DL — HIGH (ref 70–99)

## 2020-02-17 RX ADMIN — Medication 2: at 18:01

## 2020-02-17 RX ADMIN — Medication 650 MILLIGRAM(S): at 12:02

## 2020-02-17 RX ADMIN — Medication 30 UNIT(S): at 18:01

## 2020-02-17 RX ADMIN — Medication 650 MILLIGRAM(S): at 19:51

## 2020-02-17 RX ADMIN — RISPERIDONE 0.5 MILLIGRAM(S): 4 TABLET ORAL at 21:06

## 2020-02-17 RX ADMIN — Medication 30 UNIT(S): at 12:27

## 2020-02-17 RX ADMIN — RISPERIDONE 0.5 MILLIGRAM(S): 4 TABLET ORAL at 06:08

## 2020-02-17 RX ADMIN — INSULIN GLARGINE 75 UNIT(S): 100 INJECTION, SOLUTION SUBCUTANEOUS at 22:07

## 2020-02-17 RX ADMIN — Medication 2: at 12:27

## 2020-02-17 RX ADMIN — SENNA PLUS 2 TABLET(S): 8.6 TABLET ORAL at 21:06

## 2020-02-17 RX ADMIN — NYSTATIN CREAM 1 APPLICATION(S): 100000 CREAM TOPICAL at 21:07

## 2020-02-17 RX ADMIN — ENOXAPARIN SODIUM 40 MILLIGRAM(S): 100 INJECTION SUBCUTANEOUS at 12:03

## 2020-02-17 RX ADMIN — Medication 3 MILLILITER(S): at 06:08

## 2020-02-17 RX ADMIN — Medication 20 MILLIGRAM(S): at 06:08

## 2020-02-17 RX ADMIN — Medication 1: at 08:41

## 2020-02-17 RX ADMIN — OXYCODONE AND ACETAMINOPHEN 2 TABLET(S): 5; 325 TABLET ORAL at 15:30

## 2020-02-17 RX ADMIN — Medication 1: at 22:07

## 2020-02-17 RX ADMIN — Medication 30 UNIT(S): at 08:41

## 2020-02-17 RX ADMIN — Medication 650 MILLIGRAM(S): at 20:40

## 2020-02-17 RX ADMIN — BUDESONIDE AND FORMOTEROL FUMARATE DIHYDRATE 2 PUFF(S): 160; 4.5 AEROSOL RESPIRATORY (INHALATION) at 06:08

## 2020-02-17 RX ADMIN — Medication 3 MILLILITER(S): at 12:03

## 2020-02-17 RX ADMIN — NYSTATIN CREAM 1 APPLICATION(S): 100000 CREAM TOPICAL at 06:06

## 2020-02-17 RX ADMIN — Medication 81 MILLIGRAM(S): at 12:03

## 2020-02-17 RX ADMIN — OXYCODONE AND ACETAMINOPHEN 2 TABLET(S): 5; 325 TABLET ORAL at 08:44

## 2020-02-17 RX ADMIN — OXYCODONE AND ACETAMINOPHEN 2 TABLET(S): 5; 325 TABLET ORAL at 14:54

## 2020-02-17 RX ADMIN — ATORVASTATIN CALCIUM 40 MILLIGRAM(S): 80 TABLET, FILM COATED ORAL at 21:06

## 2020-02-17 RX ADMIN — RISPERIDONE 0.5 MILLIGRAM(S): 4 TABLET ORAL at 14:54

## 2020-02-17 RX ADMIN — BUDESONIDE AND FORMOTEROL FUMARATE DIHYDRATE 2 PUFF(S): 160; 4.5 AEROSOL RESPIRATORY (INHALATION) at 18:01

## 2020-02-17 RX ADMIN — Medication 5 MILLIGRAM(S): at 21:06

## 2020-02-17 RX ADMIN — Medication 3 MILLILITER(S): at 18:01

## 2020-02-17 RX ADMIN — Medication 650 MILLIGRAM(S): at 12:30

## 2020-02-17 RX ADMIN — SERTRALINE 50 MILLIGRAM(S): 25 TABLET, FILM COATED ORAL at 21:06

## 2020-02-17 RX ADMIN — PANTOPRAZOLE SODIUM 40 MILLIGRAM(S): 20 TABLET, DELAYED RELEASE ORAL at 06:08

## 2020-02-17 RX ADMIN — OXYCODONE AND ACETAMINOPHEN 2 TABLET(S): 5; 325 TABLET ORAL at 09:15

## 2020-02-17 RX ADMIN — LISINOPRIL 2.5 MILLIGRAM(S): 2.5 TABLET ORAL at 06:08

## 2020-02-17 NOTE — PROGRESS NOTE ADULT - ASSESSMENT
Assessment  DMT2: 59y Male with DM T2 with hyperglycemia, A1C 10.4%, on basal bolus insulin, blood sugars trending down, no  hypoglycemic episodes. Patient is eating full meals and ambulating, waiting placement to shelter.   CAD: on medications, no chest pain, stable, monitored.  HTN: Controlled,  on antihypertensive medications.  Overweight/Obesity: No strict exercise routines, not on any weight loss program, neither on low  calorie diet.          Laury Romero MD  Cell: [phone]  Office: [phone]

## 2020-02-17 NOTE — PROGRESS NOTE ADULT - SUBJECTIVE AND OBJECTIVE BOX
Patient is a 59y old  Male who presents with a chief complaint of 59M w/ n/v/d and hyperglycemia sent in from [facility] (17 Feb 2020 18:17)      INTERVAL HPI/OVERNIGHT EVENTS:  T(C): 36.3 (02-17-20 @ 20:14), Max: 36.7 (02-17-20 @ 06:05)  HR: 81 (02-17-20 @ 20:14) (69 - 81)  BP: 119/71 (02-17-20 @ 20:14) (110/68 - 122/75)  RR: 18 (02-17-20 @ 20:14) (18 - 18)  SpO2: 95% (02-17-20 @ 20:14) (95% - 100%)  Wt(kg): --  I&O's Summary    16 Feb 2020 07:01  -  17 Feb 2020 07:00  --------------------------------------------------------  IN: 1640 mL / OUT: 1800 mL / NET: -160 mL    17 Feb 2020 07:01  -  17 Feb 2020 22:20  --------------------------------------------------------  IN: 1460 mL / OUT: 1850 mL / NET: -390 mL        PAST MEDICAL & SURGICAL HISTORY:  Oxygen dependent  Gastroesophageal reflux disease, esophagitis presence not specified  Smoker  Bipolar 1 disorder  Coronary artery disease involving native coronary artery of native heart without angina pectoris  Type 2 diabetes mellitus without complication, with long-term current use of insulin  Pulmonary HTN  HLD (hyperlipidemia)  Stented coronary artery  COPD (chronic obstructive pulmonary disease)  No significant past surgical history      SOCIAL HISTORY  Alcohol:  Tobacco:  Illicit substance use:    FAMILY HISTORY:    REVIEW OF SYSTEMS:  CONSTITUTIONAL: No fever, weight loss, or fatigue  EYES: No eye pain, visual disturbances, or discharge  ENMT:  No difficulty hearing, tinnitus, vertigo; No sinus or throat pain  NECK: No pain or stiffness  RESPIRATORY: No cough, wheezing, chills or hemoptysis; No shortness of breath  CARDIOVASCULAR: No chest pain, palpitations, dizziness, or leg swelling  GASTROINTESTINAL: No abdominal or epigastric pain. No nausea, vomiting, or hematemesis; No diarrhea or constipation. No melena or hematochezia.  GENITOURINARY: No dysuria, frequency, hematuria, or incontinence  NEUROLOGICAL: No headaches, memory loss, loss of strength, numbness, or tremors  SKIN: No itching, burning, rashes, or lesions   LYMPH NODES: No enlarged glands  ENDOCRINE: No heat or cold intolerance; No hair loss  MUSCULOSKELETAL: No joint pain or swelling; No muscle, back, or extremity pain  PSYCHIATRIC: No depression, anxiety, mood swings, or difficulty sleeping  HEME/LYMPH: No easy bruising, or bleeding gums  ALLERY AND IMMUNOLOGIC: No hives or eczema    RADIOLOGY & ADDITIONAL TESTS:    Imaging Personally Reviewed:  [ ] YES  [ ] NO    Consultant(s) Notes Reviewed:  [ ] YES  [ ] NO    PHYSICAL EXAM:  GENERAL: NAD, well-groomed, well-developed  HEAD:  Atraumatic, Normocephalic  EYES: EOMI, PERRLA, conjunctiva and sclera clear  ENMT: No tonsillar erythema, exudates, or enlargement; Moist mucous membranes, Good dentition, No lesions  NECK: Supple, No JVD, Normal thyroid  NERVOUS SYSTEM:  Alert & Oriented X3, Good concentration; Motor Strength 5/5 B/L upper and lower extremities; DTRs 2+ intact and symmetric  CHEST/LUNG: Clear to percussion bilaterally; No rales, rhonchi, wheezing, or rubs  HEART: Regular rate and rhythm; No murmurs, rubs, or gallops  ABDOMEN: Soft, Nontender, Nondistended; Bowel sounds present  EXTREMITIES:  2+ Peripheral Pulses, No clubbing, cyanosis, or edema  LYMPH: No lymphadenopathy noted  SKIN: No rashes or lesions    LABS:              CAPILLARY BLOOD GLUCOSE      POCT Blood Glucose.: 285 mg/dL (17 Feb 2020 21:58)  POCT Blood Glucose.: 224 mg/dL (17 Feb 2020 17:18)  POCT Blood Glucose.: 248 mg/dL (17 Feb 2020 12:10)  POCT Blood Glucose.: 178 mg/dL (17 Feb 2020 08:17)            MEDICATIONS  (STANDING):  albuterol/ipratropium for Nebulization 3 milliLiter(s) Nebulizer every 6 hours  aspirin enteric coated 81 milliGRAM(s) Oral daily  atorvastatin 40 milliGRAM(s) Oral at bedtime  budesonide 160 MICROgram(s)/formoterol 4.5 MICROgram(s) Inhaler 2 Puff(s) Inhalation two times a day  dextrose 5%. 1000 milliLiter(s) (50 mL/Hr) IV Continuous <Continuous>  dextrose 50% Injectable 12.5 Gram(s) IV Push once  dextrose 50% Injectable 25 Gram(s) IV Push once  dextrose 50% Injectable 25 Gram(s) IV Push once  enoxaparin Injectable 40 milliGRAM(s) SubCutaneous daily  furosemide    Tablet 20 milliGRAM(s) Oral daily  insulin glargine Injectable (LANTUS) 75 Unit(s) SubCutaneous at bedtime  insulin lispro (HumaLOG) corrective regimen sliding scale   SubCutaneous three times a day before meals  insulin lispro (HumaLOG) corrective regimen sliding scale   SubCutaneous at bedtime  insulin lispro Injectable (HumaLOG) 30 Unit(s) SubCutaneous three times a day with meals  lisinopril 2.5 milliGRAM(s) Oral daily  melatonin 5 milliGRAM(s) Oral at bedtime  nicotine - Inhaler 1 Each Inhalation daily  nystatin Powder 1 Application(s) Topical two times a day  pantoprazole    Tablet 40 milliGRAM(s) Oral daily  polyethylene glycol 3350 17 Gram(s) Oral every 12 hours  risperiDONE   Tablet 0.5 milliGRAM(s) Oral three times a day  senna 2 Tablet(s) Oral at bedtime  sertraline 50 milliGRAM(s) Oral at bedtime    MEDICATIONS  (PRN):  acetaminophen   Tablet .. 650 milliGRAM(s) Oral every 6 hours PRN Mild Pain (1 - 3)  aluminum hydroxide/magnesium hydroxide/simethicone Suspension 30 milliLiter(s) Oral every 4 hours PRN Dyspepsia  bisacodyl 5 milliGRAM(s) Oral every 12 hours PRN Constipation  dextrose 40% Gel 15 Gram(s) Oral once PRN Blood Glucose LESS THAN 70 milliGRAM(s)/deciliter  glucagon  Injectable 1 milliGRAM(s) IntraMuscular once PRN Glucose LESS THAN 70 milligrams/deciliter  oxycodone    5 mG/acetaminophen 325 mG 2 Tablet(s) Oral every 6 hours PRN Moderate Pain (4 - 6)      Care Discussed with Consultants/Other Providers [ ] YES  [ ] NO

## 2020-02-17 NOTE — PROGRESS NOTE ADULT - SUBJECTIVE AND OBJECTIVE BOX
Chief complaint  Patient is a 59y old  Male who presents with a chief complaint of 59M w/ n/v/d and hyperglycemia sent in from [facility] (16 Feb 2020 23:58)   Review of systems  Patient in bed, looks comfortable, no fever, no hypoglycemia.    Labs and Fingersticks  CAPILLARY BLOOD GLUCOSE      POCT Blood Glucose.: 224 mg/dL (17 Feb 2020 17:18)  POCT Blood Glucose.: 248 mg/dL (17 Feb 2020 12:10)  POCT Blood Glucose.: 178 mg/dL (17 Feb 2020 08:17)  POCT Blood Glucose.: 245 mg/dL (16 Feb 2020 21:58)        Medications  MEDICATIONS  (STANDING):  albuterol/ipratropium for Nebulization 3 milliLiter(s) Nebulizer every 6 hours  aspirin enteric coated 81 milliGRAM(s) Oral daily  atorvastatin 40 milliGRAM(s) Oral at bedtime  budesonide 160 MICROgram(s)/formoterol 4.5 MICROgram(s) Inhaler 2 Puff(s) Inhalation two times a day  dextrose 5%. 1000 milliLiter(s) (50 mL/Hr) IV Continuous <Continuous>  dextrose 50% Injectable 12.5 Gram(s) IV Push once  dextrose 50% Injectable 25 Gram(s) IV Push once  dextrose 50% Injectable 25 Gram(s) IV Push once  enoxaparin Injectable 40 milliGRAM(s) SubCutaneous daily  furosemide    Tablet 20 milliGRAM(s) Oral daily  insulin glargine Injectable (LANTUS) 75 Unit(s) SubCutaneous at bedtime  insulin lispro (HumaLOG) corrective regimen sliding scale   SubCutaneous three times a day before meals  insulin lispro (HumaLOG) corrective regimen sliding scale   SubCutaneous at bedtime  insulin lispro Injectable (HumaLOG) 30 Unit(s) SubCutaneous three times a day with meals  lisinopril 2.5 milliGRAM(s) Oral daily  melatonin 5 milliGRAM(s) Oral at bedtime  nicotine - Inhaler 1 Each Inhalation daily  nystatin Powder 1 Application(s) Topical two times a day  pantoprazole    Tablet 40 milliGRAM(s) Oral daily  polyethylene glycol 3350 17 Gram(s) Oral every 12 hours  risperiDONE   Tablet 0.5 milliGRAM(s) Oral three times a day  senna 2 Tablet(s) Oral at bedtime  sertraline 50 milliGRAM(s) Oral at bedtime      Physical Exam  General: Patient comfortable in bed  Vital Signs Last 12 Hrs  T(F): 98.1 (02-17-20 @ 11:20), Max: 98.1 (02-17-20 @ 11:20)  HR: 69 (02-17-20 @ 11:20) (69 - 69)  BP: 122/75 (02-17-20 @ 11:20) (122/75 - 122/75)  BP(mean): --  RR: 18 (02-17-20 @ 11:20) (18 - 18)  SpO2: 100% (02-17-20 @ 11:20) (100% - 100%)  Neck: No palpable thyroid nodules.  CVS: S1S2, No murmurs  Respiratory: No wheezing, no crepitations  GI: Abdomen soft, bowel sounds positive  Musculoskeletal:  edema lower extremities.   Skin: No skin rashes, no ecchymosis    Diagnostics

## 2020-02-18 LAB
GLUCOSE BLDC GLUCOMTR-MCNC: 105 MG/DL — HIGH (ref 70–99)
GLUCOSE BLDC GLUCOMTR-MCNC: 181 MG/DL — HIGH (ref 70–99)
GLUCOSE BLDC GLUCOMTR-MCNC: 184 MG/DL — HIGH (ref 70–99)
GLUCOSE BLDC GLUCOMTR-MCNC: 207 MG/DL — HIGH (ref 70–99)

## 2020-02-18 RX ADMIN — OXYCODONE AND ACETAMINOPHEN 2 TABLET(S): 5; 325 TABLET ORAL at 10:00

## 2020-02-18 RX ADMIN — Medication 5 MILLIGRAM(S): at 21:42

## 2020-02-18 RX ADMIN — SERTRALINE 50 MILLIGRAM(S): 25 TABLET, FILM COATED ORAL at 21:42

## 2020-02-18 RX ADMIN — OXYCODONE AND ACETAMINOPHEN 2 TABLET(S): 5; 325 TABLET ORAL at 16:30

## 2020-02-18 RX ADMIN — Medication 650 MILLIGRAM(S): at 12:00

## 2020-02-18 RX ADMIN — Medication 1: at 12:30

## 2020-02-18 RX ADMIN — BUDESONIDE AND FORMOTEROL FUMARATE DIHYDRATE 2 PUFF(S): 160; 4.5 AEROSOL RESPIRATORY (INHALATION) at 17:26

## 2020-02-18 RX ADMIN — Medication 30 UNIT(S): at 12:31

## 2020-02-18 RX ADMIN — NYSTATIN CREAM 1 APPLICATION(S): 100000 CREAM TOPICAL at 05:12

## 2020-02-18 RX ADMIN — Medication 30 UNIT(S): at 08:59

## 2020-02-18 RX ADMIN — OXYCODONE AND ACETAMINOPHEN 2 TABLET(S): 5; 325 TABLET ORAL at 21:43

## 2020-02-18 RX ADMIN — Medication 20 MILLIGRAM(S): at 05:12

## 2020-02-18 RX ADMIN — Medication 3 MILLILITER(S): at 05:15

## 2020-02-18 RX ADMIN — LISINOPRIL 2.5 MILLIGRAM(S): 2.5 TABLET ORAL at 05:12

## 2020-02-18 RX ADMIN — BUDESONIDE AND FORMOTEROL FUMARATE DIHYDRATE 2 PUFF(S): 160; 4.5 AEROSOL RESPIRATORY (INHALATION) at 05:12

## 2020-02-18 RX ADMIN — RISPERIDONE 0.5 MILLIGRAM(S): 4 TABLET ORAL at 05:12

## 2020-02-18 RX ADMIN — OXYCODONE AND ACETAMINOPHEN 2 TABLET(S): 5; 325 TABLET ORAL at 09:05

## 2020-02-18 RX ADMIN — SENNA PLUS 2 TABLET(S): 8.6 TABLET ORAL at 21:42

## 2020-02-18 RX ADMIN — Medication 650 MILLIGRAM(S): at 21:11

## 2020-02-18 RX ADMIN — Medication 81 MILLIGRAM(S): at 12:35

## 2020-02-18 RX ADMIN — RISPERIDONE 0.5 MILLIGRAM(S): 4 TABLET ORAL at 21:42

## 2020-02-18 RX ADMIN — INSULIN GLARGINE 75 UNIT(S): 100 INJECTION, SOLUTION SUBCUTANEOUS at 21:41

## 2020-02-18 RX ADMIN — PANTOPRAZOLE SODIUM 40 MILLIGRAM(S): 20 TABLET, DELAYED RELEASE ORAL at 05:12

## 2020-02-18 RX ADMIN — ATORVASTATIN CALCIUM 40 MILLIGRAM(S): 80 TABLET, FILM COATED ORAL at 21:41

## 2020-02-18 RX ADMIN — Medication 650 MILLIGRAM(S): at 11:11

## 2020-02-18 RX ADMIN — OXYCODONE AND ACETAMINOPHEN 2 TABLET(S): 5; 325 TABLET ORAL at 15:42

## 2020-02-18 RX ADMIN — OXYCODONE AND ACETAMINOPHEN 2 TABLET(S): 5; 325 TABLET ORAL at 23:02

## 2020-02-18 RX ADMIN — Medication 2: at 08:58

## 2020-02-18 RX ADMIN — RISPERIDONE 0.5 MILLIGRAM(S): 4 TABLET ORAL at 13:04

## 2020-02-18 RX ADMIN — Medication 30 UNIT(S): at 17:26

## 2020-02-18 RX ADMIN — Medication 3 MILLILITER(S): at 12:02

## 2020-02-18 RX ADMIN — NYSTATIN CREAM 1 APPLICATION(S): 100000 CREAM TOPICAL at 21:42

## 2020-02-18 RX ADMIN — Medication 650 MILLIGRAM(S): at 19:39

## 2020-02-18 RX ADMIN — ENOXAPARIN SODIUM 40 MILLIGRAM(S): 100 INJECTION SUBCUTANEOUS at 12:35

## 2020-02-18 RX ADMIN — Medication 3 MILLILITER(S): at 17:26

## 2020-02-18 NOTE — PROGRESS NOTE ADULT - SUBJECTIVE AND OBJECTIVE BOX
Patient is a 59y old  Male who presents with a chief complaint of 59M w/ n/v/d and hyperglycemia sent in from [facility] (18 Feb 2020 16:53)    pt. seen and examined , no c/o   awaiting for placement at shelter     INTERVAL HPI/OVERNIGHT EVENTS:  T(C): 36.6 (02-18-20 @ 11:59), Max: 37.3 (02-18-20 @ 00:33)  HR: 91 (02-18-20 @ 11:59) (70 - 91)  BP: 111/71 (02-18-20 @ 11:59) (108/72 - 119/71)  RR: 18 (02-18-20 @ 11:59) (18 - 18)  SpO2: 94% (02-18-20 @ 11:59) (94% - 97%)  Wt(kg): --  I&O's Summary    17 Feb 2020 07:01  -  18 Feb 2020 07:00  --------------------------------------------------------  IN: 1580 mL / OUT: 1850 mL / NET: -270 mL    18 Feb 2020 07:01  -  18 Feb 2020 19:30  --------------------------------------------------------  IN: 720 mL / OUT: 850 mL / NET: -130 mL        PAST MEDICAL & SURGICAL HISTORY:  Oxygen dependent  Gastroesophageal reflux disease, esophagitis presence not specified  Smoker  Bipolar 1 disorder  Coronary artery disease involving native coronary artery of native heart without angina pectoris  Type 2 diabetes mellitus without complication, with long-term current use of insulin  Pulmonary HTN  HLD (hyperlipidemia)  Stented coronary artery  COPD (chronic obstructive pulmonary disease)  No significant past surgical history      SOCIAL HISTORY  Alcohol:  Tobacco:  Illicit substance use:    FAMILY HISTORY:    REVIEW OF SYSTEMS:  CONSTITUTIONAL: No fever, weight loss, or fatigue  EYES: No eye pain, visual disturbances, or discharge  ENMT:  No difficulty hearing, tinnitus, vertigo; No sinus or throat pain  NECK: No pain or stiffness  RESPIRATORY: No cough, wheezing, chills or hemoptysis; No shortness of breath  CARDIOVASCULAR: No chest pain, palpitations, dizziness, or leg swelling  GASTROINTESTINAL: No abdominal or epigastric pain. No nausea, vomiting, or hematemesis; No diarrhea or constipation. No melena or hematochezia.  GENITOURINARY: No dysuria, frequency, hematuria, or incontinence  NEUROLOGICAL: No headaches, memory loss, loss of strength, numbness, or tremors  SKIN: No itching, burning, rashes, or lesions   LYMPH NODES: No enlarged glands  ENDOCRINE: No heat or cold intolerance; No hair loss  MUSCULOSKELETAL: No joint pain or swelling; No muscle, back, or extremity pain  PSYCHIATRIC: No depression, anxiety, mood swings, or difficulty sleeping  HEME/LYMPH: No easy bruising, or bleeding gums  ALLERY AND IMMUNOLOGIC: No hives or eczema    RADIOLOGY & ADDITIONAL TESTS:    Imaging Personally Reviewed:  [ ] YES  [ ] NO    Consultant(s) Notes Reviewed:  [ ] YES  [ ] NO    PHYSICAL EXAM:  GENERAL: NAD, well-groomed, well-developed  HEAD:  Atraumatic, Normocephalic  EYES: EOMI, PERRLA, conjunctiva and sclera clear  ENMT: No tonsillar erythema, exudates, or enlargement; Moist mucous membranes, Good dentition, No lesions  NECK: Supple, No JVD, Normal thyroid  NERVOUS SYSTEM:  Alert & Oriented X3, Good concentration; Motor Strength 5/5 B/L upper and lower extremities; DTRs 2+ intact and symmetric  CHEST/LUNG: Clear to percussion bilaterally; No rales, rhonchi, wheezing, or rubs  HEART: Regular rate and rhythm; No murmurs, rubs, or gallops  ABDOMEN: Soft, Nontender, Nondistended; Bowel sounds present  EXTREMITIES:  2+ Peripheral Pulses, No clubbing, cyanosis, or edema  LYMPH: No lymphadenopathy noted  SKIN: No rashes or lesions    LABS:              CAPILLARY BLOOD GLUCOSE      POCT Blood Glucose.: 105 mg/dL (18 Feb 2020 17:16)  POCT Blood Glucose.: 181 mg/dL (18 Feb 2020 12:18)  POCT Blood Glucose.: 207 mg/dL (18 Feb 2020 08:49)  POCT Blood Glucose.: 285 mg/dL (17 Feb 2020 21:58)            MEDICATIONS  (STANDING):  albuterol/ipratropium for Nebulization 3 milliLiter(s) Nebulizer every 6 hours  aspirin enteric coated 81 milliGRAM(s) Oral daily  atorvastatin 40 milliGRAM(s) Oral at bedtime  budesonide 160 MICROgram(s)/formoterol 4.5 MICROgram(s) Inhaler 2 Puff(s) Inhalation two times a day  dextrose 5%. 1000 milliLiter(s) (50 mL/Hr) IV Continuous <Continuous>  dextrose 50% Injectable 12.5 Gram(s) IV Push once  dextrose 50% Injectable 25 Gram(s) IV Push once  dextrose 50% Injectable 25 Gram(s) IV Push once  enoxaparin Injectable 40 milliGRAM(s) SubCutaneous daily  furosemide    Tablet 20 milliGRAM(s) Oral daily  insulin glargine Injectable (LANTUS) 75 Unit(s) SubCutaneous at bedtime  insulin lispro (HumaLOG) corrective regimen sliding scale   SubCutaneous three times a day before meals  insulin lispro (HumaLOG) corrective regimen sliding scale   SubCutaneous at bedtime  insulin lispro Injectable (HumaLOG) 30 Unit(s) SubCutaneous three times a day with meals  lisinopril 2.5 milliGRAM(s) Oral daily  melatonin 5 milliGRAM(s) Oral at bedtime  nicotine - Inhaler 1 Each Inhalation daily  nystatin Powder 1 Application(s) Topical two times a day  pantoprazole    Tablet 40 milliGRAM(s) Oral daily  polyethylene glycol 3350 17 Gram(s) Oral every 12 hours  risperiDONE   Tablet 0.5 milliGRAM(s) Oral three times a day  senna 2 Tablet(s) Oral at bedtime  sertraline 50 milliGRAM(s) Oral at bedtime    MEDICATIONS  (PRN):  acetaminophen   Tablet .. 650 milliGRAM(s) Oral every 6 hours PRN Mild Pain (1 - 3)  aluminum hydroxide/magnesium hydroxide/simethicone Suspension 30 milliLiter(s) Oral every 4 hours PRN Dyspepsia  bisacodyl 5 milliGRAM(s) Oral every 12 hours PRN Constipation  dextrose 40% Gel 15 Gram(s) Oral once PRN Blood Glucose LESS THAN 70 milliGRAM(s)/deciliter  glucagon  Injectable 1 milliGRAM(s) IntraMuscular once PRN Glucose LESS THAN 70 milligrams/deciliter  oxycodone    5 mG/acetaminophen 325 mG 2 Tablet(s) Oral every 6 hours PRN Moderate Pain (4 - 6)      Care Discussed with Consultants/Other Providers [ ] YES  [ ] NO

## 2020-02-18 NOTE — PROGRESS NOTE ADULT - SUBJECTIVE AND OBJECTIVE BOX
Chief complaint  Patient is a 59y old  Male who presents with a chief complaint of 59M w/ n/v/d and hyperglycemia sent in from [facility] (17 Feb 2020 22:20)   Review of systems  Patient in bed, looks comfortable, no hypoglycemia.    Labs and Fingersticks  CAPILLARY BLOOD GLUCOSE      POCT Blood Glucose.: 181 mg/dL (18 Feb 2020 12:18)  POCT Blood Glucose.: 207 mg/dL (18 Feb 2020 08:49)  POCT Blood Glucose.: 285 mg/dL (17 Feb 2020 21:58)  POCT Blood Glucose.: 224 mg/dL (17 Feb 2020 17:18)                        Medications  MEDICATIONS  (STANDING):  albuterol/ipratropium for Nebulization 3 milliLiter(s) Nebulizer every 6 hours  aspirin enteric coated 81 milliGRAM(s) Oral daily  atorvastatin 40 milliGRAM(s) Oral at bedtime  budesonide 160 MICROgram(s)/formoterol 4.5 MICROgram(s) Inhaler 2 Puff(s) Inhalation two times a day  dextrose 5%. 1000 milliLiter(s) (50 mL/Hr) IV Continuous <Continuous>  dextrose 50% Injectable 12.5 Gram(s) IV Push once  dextrose 50% Injectable 25 Gram(s) IV Push once  dextrose 50% Injectable 25 Gram(s) IV Push once  enoxaparin Injectable 40 milliGRAM(s) SubCutaneous daily  furosemide    Tablet 20 milliGRAM(s) Oral daily  insulin glargine Injectable (LANTUS) 75 Unit(s) SubCutaneous at bedtime  insulin lispro (HumaLOG) corrective regimen sliding scale   SubCutaneous three times a day before meals  insulin lispro (HumaLOG) corrective regimen sliding scale   SubCutaneous at bedtime  insulin lispro Injectable (HumaLOG) 30 Unit(s) SubCutaneous three times a day with meals  lisinopril 2.5 milliGRAM(s) Oral daily  melatonin 5 milliGRAM(s) Oral at bedtime  nicotine - Inhaler 1 Each Inhalation daily  nystatin Powder 1 Application(s) Topical two times a day  pantoprazole    Tablet 40 milliGRAM(s) Oral daily  polyethylene glycol 3350 17 Gram(s) Oral every 12 hours  risperiDONE   Tablet 0.5 milliGRAM(s) Oral three times a day  senna 2 Tablet(s) Oral at bedtime  sertraline 50 milliGRAM(s) Oral at bedtime      Physical Exam  General: Patient comfortable in bed  Vital Signs Last 12 Hrs  T(F): 97.9 (02-18-20 @ 11:59), Max: 97.9 (02-18-20 @ 11:59)  HR: 91 (02-18-20 @ 11:59) (91 - 91)  BP: 111/71 (02-18-20 @ 11:59) (111/71 - 111/71)  BP(mean): --  RR: 18 (02-18-20 @ 11:59) (18 - 18)  SpO2: 94% (02-18-20 @ 11:59) (94% - 94%)  Neck: No palpable thyroid nodules.  CVS: S1S2, No murmurs  Respiratory: No wheezing, no crepitations  GI: Abdomen soft, bowel sounds positive  Musculoskeletal:  edema lower extremities.   Skin: No skin rashes, no ecchymosis    Diagnostics Chief complaint  Patient is a 59y old  Male who presents with a chief complaint of 59M w/ n/v/d and hyperglycemia sent in from [facility] (17 Feb 2020 22:20)   Review of systems  Patient in bed, looks comfortable,  no hypoglycemia.    Labs and Fingersticks  CAPILLARY BLOOD GLUCOSE      POCT Blood Glucose.: 181 mg/dL (18 Feb 2020 12:18)  POCT Blood Glucose.: 207 mg/dL (18 Feb 2020 08:49)  POCT Blood Glucose.: 285 mg/dL (17 Feb 2020 21:58)  POCT Blood Glucose.: 224 mg/dL (17 Feb 2020 17:18)    Medications  MEDICATIONS  (STANDING):  albuterol/ipratropium for Nebulization 3 milliLiter(s) Nebulizer every 6 hours  aspirin enteric coated 81 milliGRAM(s) Oral daily  atorvastatin 40 milliGRAM(s) Oral at bedtime  budesonide 160 MICROgram(s)/formoterol 4.5 MICROgram(s) Inhaler 2 Puff(s) Inhalation two times a day  dextrose 5%. 1000 milliLiter(s) (50 mL/Hr) IV Continuous <Continuous>  dextrose 50% Injectable 12.5 Gram(s) IV Push once  dextrose 50% Injectable 25 Gram(s) IV Push once  dextrose 50% Injectable 25 Gram(s) IV Push once  enoxaparin Injectable 40 milliGRAM(s) SubCutaneous daily  furosemide    Tablet 20 milliGRAM(s) Oral daily  insulin glargine Injectable (LANTUS) 75 Unit(s) SubCutaneous at bedtime  insulin lispro (HumaLOG) corrective regimen sliding scale   SubCutaneous three times a day before meals  insulin lispro (HumaLOG) corrective regimen sliding scale   SubCutaneous at bedtime  insulin lispro Injectable (HumaLOG) 30 Unit(s) SubCutaneous three times a day with meals  lisinopril 2.5 milliGRAM(s) Oral daily  melatonin 5 milliGRAM(s) Oral at bedtime  nicotine - Inhaler 1 Each Inhalation daily  nystatin Powder 1 Application(s) Topical two times a day  pantoprazole    Tablet 40 milliGRAM(s) Oral daily  polyethylene glycol 3350 17 Gram(s) Oral every 12 hours  risperiDONE   Tablet 0.5 milliGRAM(s) Oral three times a day  senna 2 Tablet(s) Oral at bedtime  sertraline 50 milliGRAM(s) Oral at bedtime      Physical Exam  General: Patient comfortable in bed  Vital Signs Last 12 Hrs  T(F): 97.9 (02-18-20 @ 11:59), Max: 97.9 (02-18-20 @ 11:59)  HR: 91 (02-18-20 @ 11:59) (91 - 91)  BP: 111/71 (02-18-20 @ 11:59) (111/71 - 111/71)  BP(mean): --  RR: 18 (02-18-20 @ 11:59) (18 - 18)  SpO2: 94% (02-18-20 @ 11:59) (94% - 94%)  Neck: No palpable thyroid nodules.  CVS: S1S2, No murmurs  Respiratory: No wheezing, no crepitations  GI: Abdomen soft, bowel sounds positive  Musculoskeletal:  edema lower extremities.   Skin: No skin rashes, no ecchymosis    Diagnostics

## 2020-02-18 NOTE — PROGRESS NOTE ADULT - PROBLEM SELECTOR PLAN 1
Will continue current insulin regimen for now. Will continue monitoring FS, log, will Follow up.  Suggest to DC on current insulin regimen and FU endo 4 weeks.  Discussed plan with patient. Counseled for compliance with consistent low carb diet, exercise.

## 2020-02-18 NOTE — PROGRESS NOTE ADULT - ASSESSMENT
Assessment  DMT2: 59y Male with DM T2 with hyperglycemia, A1C 10.4%, on basal bolus insulin, blood sugars trending within overall acceptable range, no  hypoglycemic episodes. Patient is eating full meals with good appetite, alert and comfortable, still awaiting placement to shelter.   CAD: on medications, no chest pain, stable, monitored.  HTN: Controlled,  on antihypertensive medications.  Overweight/Obesity: No strict exercise routines, not on any weight loss program, neither on low  calorie diet.          Laury Romero MD  Cell: [phone]  Office: [phone] Assessment  DMT2: 59y Male with DM T2 with hyperglycemia, A1C 10.4%, on basal bolus insulin, blood sugars trending within overall acceptable range, no  hypoglycemic episodes. Patient is eating full meals with good appetite, alert and comfortable, still awaiting placement to shelter.   CAD: on medications, no chest pain, stable, monitored.  HTN: Controlled,  on antihypertensive medications.  Overweight/Obesity: No strict exercise routines, not on any weight loss program,  neither on low  calorie diet.          Laury Romero MD  Cell: [phone]  Office: [phone]

## 2020-02-19 LAB
GLUCOSE BLDC GLUCOMTR-MCNC: 164 MG/DL — HIGH (ref 70–99)
GLUCOSE BLDC GLUCOMTR-MCNC: 166 MG/DL — HIGH (ref 70–99)
GLUCOSE BLDC GLUCOMTR-MCNC: 200 MG/DL — HIGH (ref 70–99)
GLUCOSE BLDC GLUCOMTR-MCNC: 234 MG/DL — HIGH (ref 70–99)

## 2020-02-19 RX ORDER — OXYCODONE AND ACETAMINOPHEN 5; 325 MG/1; MG/1
2 TABLET ORAL EVERY 6 HOURS
Refills: 0 | Status: DISCONTINUED | OUTPATIENT
Start: 2020-02-19 | End: 2020-02-26

## 2020-02-19 RX ADMIN — NYSTATIN CREAM 1 APPLICATION(S): 100000 CREAM TOPICAL at 22:46

## 2020-02-19 RX ADMIN — LISINOPRIL 2.5 MILLIGRAM(S): 2.5 TABLET ORAL at 05:44

## 2020-02-19 RX ADMIN — Medication 5 MILLIGRAM(S): at 22:44

## 2020-02-19 RX ADMIN — Medication 3 MILLILITER(S): at 11:27

## 2020-02-19 RX ADMIN — OXYCODONE AND ACETAMINOPHEN 2 TABLET(S): 5; 325 TABLET ORAL at 11:25

## 2020-02-19 RX ADMIN — Medication 3 MILLILITER(S): at 17:38

## 2020-02-19 RX ADMIN — Medication 1: at 17:37

## 2020-02-19 RX ADMIN — Medication 650 MILLIGRAM(S): at 12:45

## 2020-02-19 RX ADMIN — Medication 30 UNIT(S): at 13:24

## 2020-02-19 RX ADMIN — Medication 1: at 13:24

## 2020-02-19 RX ADMIN — OXYCODONE AND ACETAMINOPHEN 2 TABLET(S): 5; 325 TABLET ORAL at 17:43

## 2020-02-19 RX ADMIN — OXYCODONE AND ACETAMINOPHEN 2 TABLET(S): 5; 325 TABLET ORAL at 18:31

## 2020-02-19 RX ADMIN — Medication 3 MILLILITER(S): at 05:44

## 2020-02-19 RX ADMIN — BUDESONIDE AND FORMOTEROL FUMARATE DIHYDRATE 2 PUFF(S): 160; 4.5 AEROSOL RESPIRATORY (INHALATION) at 05:45

## 2020-02-19 RX ADMIN — RISPERIDONE 0.5 MILLIGRAM(S): 4 TABLET ORAL at 22:44

## 2020-02-19 RX ADMIN — Medication 1: at 09:21

## 2020-02-19 RX ADMIN — Medication 30 UNIT(S): at 09:21

## 2020-02-19 RX ADMIN — OXYCODONE AND ACETAMINOPHEN 2 TABLET(S): 5; 325 TABLET ORAL at 12:30

## 2020-02-19 RX ADMIN — Medication 30 UNIT(S): at 17:37

## 2020-02-19 RX ADMIN — NYSTATIN CREAM 1 APPLICATION(S): 100000 CREAM TOPICAL at 05:43

## 2020-02-19 RX ADMIN — SERTRALINE 50 MILLIGRAM(S): 25 TABLET, FILM COATED ORAL at 22:45

## 2020-02-19 RX ADMIN — Medication 20 MILLIGRAM(S): at 05:44

## 2020-02-19 RX ADMIN — Medication 81 MILLIGRAM(S): at 11:26

## 2020-02-19 RX ADMIN — BUDESONIDE AND FORMOTEROL FUMARATE DIHYDRATE 2 PUFF(S): 160; 4.5 AEROSOL RESPIRATORY (INHALATION) at 17:38

## 2020-02-19 RX ADMIN — ATORVASTATIN CALCIUM 40 MILLIGRAM(S): 80 TABLET, FILM COATED ORAL at 22:45

## 2020-02-19 RX ADMIN — Medication 650 MILLIGRAM(S): at 20:15

## 2020-02-19 RX ADMIN — INSULIN GLARGINE 75 UNIT(S): 100 INJECTION, SOLUTION SUBCUTANEOUS at 22:42

## 2020-02-19 RX ADMIN — Medication 650 MILLIGRAM(S): at 13:25

## 2020-02-19 RX ADMIN — Medication 650 MILLIGRAM(S): at 21:15

## 2020-02-19 RX ADMIN — PANTOPRAZOLE SODIUM 40 MILLIGRAM(S): 20 TABLET, DELAYED RELEASE ORAL at 05:45

## 2020-02-19 RX ADMIN — ENOXAPARIN SODIUM 40 MILLIGRAM(S): 100 INJECTION SUBCUTANEOUS at 11:26

## 2020-02-19 RX ADMIN — RISPERIDONE 0.5 MILLIGRAM(S): 4 TABLET ORAL at 05:44

## 2020-02-19 RX ADMIN — RISPERIDONE 0.5 MILLIGRAM(S): 4 TABLET ORAL at 13:25

## 2020-02-19 NOTE — PROGRESS NOTE ADULT - SUBJECTIVE AND OBJECTIVE BOX
Patient is a 59y old  Male who presents with a chief complaint of 59M w/ n/v/d and hyperglycemia sent in from [facility] (19 Feb 2020 15:56)      INTERVAL HPI/OVERNIGHT EVENTS:  T(C): 37.3 (02-20-20 @ 00:42), Max: 37.3 (02-20-20 @ 00:42)  HR: 78 (02-20-20 @ 00:42) (78 - 89)  BP: 112/62 (02-20-20 @ 00:42) (110/71 - 115/73)  RR: 18 (02-20-20 @ 00:42) (18 - 18)  SpO2: 97% (02-20-20 @ 00:42) (94% - 97%)  Wt(kg): --  I&O's Summary    18 Feb 2020 07:01  -  19 Feb 2020 07:00  --------------------------------------------------------  IN: 1080 mL / OUT: 1650 mL / NET: -570 mL    19 Feb 2020 07:01  -  20 Feb 2020 01:04  --------------------------------------------------------  IN: 960 mL / OUT: 1150 mL / NET: -190 mL        PAST MEDICAL & SURGICAL HISTORY:  Oxygen dependent  Gastroesophageal reflux disease, esophagitis presence not specified  Smoker  Bipolar 1 disorder  Coronary artery disease involving native coronary artery of native heart without angina pectoris  Type 2 diabetes mellitus without complication, with long-term current use of insulin  Pulmonary HTN  HLD (hyperlipidemia)  Stented coronary artery  COPD (chronic obstructive pulmonary disease)  No significant past surgical history      SOCIAL HISTORY  Alcohol:  Tobacco:  Illicit substance use:    FAMILY HISTORY:    REVIEW OF SYSTEMS:  CONSTITUTIONAL: No fever, weight loss, or fatigue  EYES: No eye pain, visual disturbances, or discharge  ENMT:  No difficulty hearing, tinnitus, vertigo; No sinus or throat pain  NECK: No pain or stiffness  RESPIRATORY: No cough, wheezing, chills or hemoptysis; No shortness of breath  CARDIOVASCULAR: No chest pain, palpitations, dizziness, or leg swelling  GASTROINTESTINAL: No abdominal or epigastric pain. No nausea, vomiting, or hematemesis; No diarrhea or constipation. No melena or hematochezia.  GENITOURINARY: No dysuria, frequency, hematuria, or incontinence  NEUROLOGICAL: No headaches, memory loss, loss of strength, numbness, or tremors  SKIN: No itching, burning, rashes, or lesions   LYMPH NODES: No enlarged glands  ENDOCRINE: No heat or cold intolerance; No hair loss  MUSCULOSKELETAL: No joint pain or swelling; No muscle, back, or extremity pain  PSYCHIATRIC: No depression, anxiety, mood swings, or difficulty sleeping  HEME/LYMPH: No easy bruising, or bleeding gums  ALLERY AND IMMUNOLOGIC: No hives or eczema    RADIOLOGY & ADDITIONAL TESTS:    Imaging Personally Reviewed:  [ ] YES  [ ] NO    Consultant(s) Notes Reviewed:  [ ] YES  [ ] NO    PHYSICAL EXAM:  GENERAL: NAD, well-groomed, well-developed  HEAD:  Atraumatic, Normocephalic  EYES: EOMI, PERRLA, conjunctiva and sclera clear  ENMT: No tonsillar erythema, exudates, or enlargement; Moist mucous membranes, Good dentition, No lesions  NECK: Supple, No JVD, Normal thyroid  NERVOUS SYSTEM:  Alert & Oriented X3, Good concentration; Motor Strength 5/5 B/L upper and lower extremities; DTRs 2+ intact and symmetric  CHEST/LUNG: Clear to percussion bilaterally; No rales, rhonchi, wheezing, or rubs  HEART: Regular rate and rhythm; No murmurs, rubs, or gallops  ABDOMEN: Soft, Nontender, Nondistended; Bowel sounds present  EXTREMITIES:  2+ Peripheral Pulses, No clubbing, cyanosis, or edema  LYMPH: No lymphadenopathy noted  SKIN: No rashes or lesions    LABS:              CAPILLARY BLOOD GLUCOSE      POCT Blood Glucose.: 234 mg/dL (19 Feb 2020 21:39)  POCT Blood Glucose.: 166 mg/dL (19 Feb 2020 17:07)  POCT Blood Glucose.: 164 mg/dL (19 Feb 2020 12:29)  POCT Blood Glucose.: 200 mg/dL (19 Feb 2020 08:39)            MEDICATIONS  (STANDING):  albuterol/ipratropium for Nebulization 3 milliLiter(s) Nebulizer every 6 hours  aspirin enteric coated 81 milliGRAM(s) Oral daily  atorvastatin 40 milliGRAM(s) Oral at bedtime  budesonide 160 MICROgram(s)/formoterol 4.5 MICROgram(s) Inhaler 2 Puff(s) Inhalation two times a day  dextrose 5%. 1000 milliLiter(s) (50 mL/Hr) IV Continuous <Continuous>  dextrose 50% Injectable 12.5 Gram(s) IV Push once  dextrose 50% Injectable 25 Gram(s) IV Push once  dextrose 50% Injectable 25 Gram(s) IV Push once  enoxaparin Injectable 40 milliGRAM(s) SubCutaneous daily  furosemide    Tablet 20 milliGRAM(s) Oral daily  insulin glargine Injectable (LANTUS) 75 Unit(s) SubCutaneous at bedtime  insulin lispro (HumaLOG) corrective regimen sliding scale   SubCutaneous three times a day before meals  insulin lispro (HumaLOG) corrective regimen sliding scale   SubCutaneous at bedtime  insulin lispro Injectable (HumaLOG) 30 Unit(s) SubCutaneous three times a day with meals  lisinopril 2.5 milliGRAM(s) Oral daily  melatonin 5 milliGRAM(s) Oral at bedtime  nicotine - Inhaler 1 Each Inhalation daily  nystatin Powder 1 Application(s) Topical two times a day  pantoprazole    Tablet 40 milliGRAM(s) Oral daily  polyethylene glycol 3350 17 Gram(s) Oral every 12 hours  risperiDONE   Tablet 0.5 milliGRAM(s) Oral three times a day  senna 2 Tablet(s) Oral at bedtime  sertraline 50 milliGRAM(s) Oral at bedtime    MEDICATIONS  (PRN):  acetaminophen   Tablet .. 650 milliGRAM(s) Oral every 6 hours PRN Mild Pain (1 - 3)  aluminum hydroxide/magnesium hydroxide/simethicone Suspension 30 milliLiter(s) Oral every 4 hours PRN Dyspepsia  bisacodyl 5 milliGRAM(s) Oral every 12 hours PRN Constipation  dextrose 40% Gel 15 Gram(s) Oral once PRN Blood Glucose LESS THAN 70 milliGRAM(s)/deciliter  glucagon  Injectable 1 milliGRAM(s) IntraMuscular once PRN Glucose LESS THAN 70 milligrams/deciliter  oxycodone    5 mG/acetaminophen 325 mG 2 Tablet(s) Oral every 6 hours PRN Moderate Pain (4 - 6)      Care Discussed with Consultants/Other Providers [ ] YES  [ ] NO

## 2020-02-19 NOTE — PROGRESS NOTE ADULT - PROBLEM SELECTOR PLAN 1
Will continue current insulin regimen for now. Will continue monitoring FS, log, will Follow up.  Suggest to DC on current insulin regimen and FU endo 4 weeks.  Discussed plan with patient. Counseled for compliance with consistent low carb diet, exercise. Will continue current insulin regimen for now. Will continue monitoring FS, log, will Follow up.

## 2020-02-19 NOTE — PROGRESS NOTE ADULT - SUBJECTIVE AND OBJECTIVE BOX
Chief complaint  Patient is a 59y old  Male who presents with a chief complaint of 59M w/ n/v/d and hyperglycemia sent in from [facility] (18 Feb 2020 19:30)   Review of systems  Patient sitting up in bed eating lunch, looks comfortable, no hypoglycemia.    Labs and Fingersticks  CAPILLARY BLOOD GLUCOSE      POCT Blood Glucose.: 164 mg/dL (19 Feb 2020 12:29)  POCT Blood Glucose.: 200 mg/dL (19 Feb 2020 08:39)  POCT Blood Glucose.: 184 mg/dL (18 Feb 2020 21:24)  POCT Blood Glucose.: 105 mg/dL (18 Feb 2020 17:16)                        Medications  MEDICATIONS  (STANDING):  albuterol/ipratropium for Nebulization 3 milliLiter(s) Nebulizer every 6 hours  aspirin enteric coated 81 milliGRAM(s) Oral daily  atorvastatin 40 milliGRAM(s) Oral at bedtime  budesonide 160 MICROgram(s)/formoterol 4.5 MICROgram(s) Inhaler 2 Puff(s) Inhalation two times a day  dextrose 5%. 1000 milliLiter(s) (50 mL/Hr) IV Continuous <Continuous>  dextrose 50% Injectable 12.5 Gram(s) IV Push once  dextrose 50% Injectable 25 Gram(s) IV Push once  dextrose 50% Injectable 25 Gram(s) IV Push once  enoxaparin Injectable 40 milliGRAM(s) SubCutaneous daily  furosemide    Tablet 20 milliGRAM(s) Oral daily  insulin glargine Injectable (LANTUS) 75 Unit(s) SubCutaneous at bedtime  insulin lispro (HumaLOG) corrective regimen sliding scale   SubCutaneous three times a day before meals  insulin lispro (HumaLOG) corrective regimen sliding scale   SubCutaneous at bedtime  insulin lispro Injectable (HumaLOG) 30 Unit(s) SubCutaneous three times a day with meals  lisinopril 2.5 milliGRAM(s) Oral daily  melatonin 5 milliGRAM(s) Oral at bedtime  nicotine - Inhaler 1 Each Inhalation daily  nystatin Powder 1 Application(s) Topical two times a day  pantoprazole    Tablet 40 milliGRAM(s) Oral daily  polyethylene glycol 3350 17 Gram(s) Oral every 12 hours  risperiDONE   Tablet 0.5 milliGRAM(s) Oral three times a day  senna 2 Tablet(s) Oral at bedtime  sertraline 50 milliGRAM(s) Oral at bedtime      Physical Exam  General: Patient comfortable in bed  Vital Signs Last 12 Hrs  T(F): 98.5 (02-19-20 @ 11:23), Max: 98.5 (02-19-20 @ 11:23)  HR: 81 (02-19-20 @ 11:23) (78 - 81)  BP: 115/73 (02-19-20 @ 11:23) (110/71 - 115/73)  BP(mean): --  RR: 18 (02-19-20 @ 11:23) (18 - 18)  SpO2: 94% (02-19-20 @ 11:23) (94% - 95%)  Neck: No palpable thyroid nodules.  CVS: S1S2, No murmurs  Respiratory: No wheezing, no crepitations  GI: Abdomen soft, bowel sounds positive  Musculoskeletal:  edema lower extremities.   Skin: No skin rashes, no ecchymosis    Diagnostics Chief complaint  Patient is a 59y old  Male who presents with a chief complaint of 59M w/ n/v/d and hyperglycemia sent in from [facility] (18 Feb 2020 19:30)   Review of systems  Patient sitting up in bed eating lunch,  looks comfortable, no hypoglycemia.    Labs and Fingersticks  CAPILLARY BLOOD GLUCOSE      POCT Blood Glucose.: 164 mg/dL (19 Feb 2020 12:29)  POCT Blood Glucose.: 200 mg/dL (19 Feb 2020 08:39)  POCT Blood Glucose.: 184 mg/dL (18 Feb 2020 21:24)  POCT Blood Glucose.: 105 mg/dL (18 Feb 2020 17:16)                        Medications  MEDICATIONS  (STANDING):  albuterol/ipratropium for Nebulization 3 milliLiter(s) Nebulizer every 6 hours  aspirin enteric coated 81 milliGRAM(s) Oral daily  atorvastatin 40 milliGRAM(s) Oral at bedtime  budesonide 160 MICROgram(s)/formoterol 4.5 MICROgram(s) Inhaler 2 Puff(s) Inhalation two times a day  dextrose 5%. 1000 milliLiter(s) (50 mL/Hr) IV Continuous <Continuous>  dextrose 50% Injectable 12.5 Gram(s) IV Push once  dextrose 50% Injectable 25 Gram(s) IV Push once  dextrose 50% Injectable 25 Gram(s) IV Push once  enoxaparin Injectable 40 milliGRAM(s) SubCutaneous daily  furosemide    Tablet 20 milliGRAM(s) Oral daily  insulin glargine Injectable (LANTUS) 75 Unit(s) SubCutaneous at bedtime  insulin lispro (HumaLOG) corrective regimen sliding scale   SubCutaneous three times a day before meals  insulin lispro (HumaLOG) corrective regimen sliding scale   SubCutaneous at bedtime  insulin lispro Injectable (HumaLOG) 30 Unit(s) SubCutaneous three times a day with meals  lisinopril 2.5 milliGRAM(s) Oral daily  melatonin 5 milliGRAM(s) Oral at bedtime  nicotine - Inhaler 1 Each Inhalation daily  nystatin Powder 1 Application(s) Topical two times a day  pantoprazole    Tablet 40 milliGRAM(s) Oral daily  polyethylene glycol 3350 17 Gram(s) Oral every 12 hours  risperiDONE   Tablet 0.5 milliGRAM(s) Oral three times a day  senna 2 Tablet(s) Oral at bedtime  sertraline 50 milliGRAM(s) Oral at bedtime      Physical Exam  General: Patient comfortable in bed  Vital Signs Last 12 Hrs  T(F): 98.5 (02-19-20 @ 11:23), Max: 98.5 (02-19-20 @ 11:23)  HR: 81 (02-19-20 @ 11:23) (78 - 81)  BP: 115/73 (02-19-20 @ 11:23) (110/71 - 115/73)  BP(mean): --  RR: 18 (02-19-20 @ 11:23) (18 - 18)  SpO2: 94% (02-19-20 @ 11:23) (94% - 95%)  Neck: No palpable thyroid nodules.  CVS: S1S2, No murmurs  Respiratory: No wheezing, no crepitations  GI: Abdomen soft, bowel sounds positive  Musculoskeletal:  edema lower extremities.   Skin: No skin rashes, no ecchymosis    Diagnostics

## 2020-02-19 NOTE — PROGRESS NOTE ADULT - ASSESSMENT
Assessment  DMT2: 59y Male with DM T2 with hyperglycemia, A1C 10.4%, on basal bolus insulin, blood sugars much improved, FS trending within overall acceptable range, no hypoglycemic episodes. Patient is eating full meals with good appetite, alert and comfortable, still awaiting placement to shelter. No acute events.  CAD: on medications, no chest pain, stable, monitored.  HTN: Controlled,  on antihypertensive medications.  Overweight/Obesity: No strict exercise routines, not on any weight loss program,  neither on low  calorie diet.          Laury Romero MD  Cell: [phone]  Office: [phone] Assessment  DMT2: 59y Male with DM T2 with hyperglycemia, A1C 10.4%, on basal bolus insulin, blood sugars much improved, FS trending within  overall acceptable range, no hypoglycemic episodes. Patient is eating full meals with good appetite, alert and comfortable, still awaiting placement to shelter. No acute events.  CAD: on medications, no chest pain, stable, monitored.  HTN: Controlled,  on antihypertensive medications.  Overweight/Obesity: No strict exercise routines, not on any weight loss program,  neither on low  calorie diet.          Laury Romero MD  Cell: [phone]  Office: [phone]

## 2020-02-20 LAB
GLUCOSE BLDC GLUCOMTR-MCNC: 128 MG/DL — HIGH (ref 70–99)
GLUCOSE BLDC GLUCOMTR-MCNC: 132 MG/DL — HIGH (ref 70–99)
GLUCOSE BLDC GLUCOMTR-MCNC: 169 MG/DL — HIGH (ref 70–99)
GLUCOSE BLDC GLUCOMTR-MCNC: 180 MG/DL — HIGH (ref 70–99)

## 2020-02-20 RX ADMIN — Medication 650 MILLIGRAM(S): at 15:32

## 2020-02-20 RX ADMIN — Medication 650 MILLIGRAM(S): at 09:11

## 2020-02-20 RX ADMIN — Medication 5 MILLIGRAM(S): at 21:28

## 2020-02-20 RX ADMIN — OXYCODONE AND ACETAMINOPHEN 2 TABLET(S): 5; 325 TABLET ORAL at 01:06

## 2020-02-20 RX ADMIN — OXYCODONE AND ACETAMINOPHEN 2 TABLET(S): 5; 325 TABLET ORAL at 06:45

## 2020-02-20 RX ADMIN — OXYCODONE AND ACETAMINOPHEN 2 TABLET(S): 5; 325 TABLET ORAL at 20:29

## 2020-02-20 RX ADMIN — INSULIN GLARGINE 75 UNIT(S): 100 INJECTION, SOLUTION SUBCUTANEOUS at 23:00

## 2020-02-20 RX ADMIN — Medication 3 MILLILITER(S): at 17:26

## 2020-02-20 RX ADMIN — Medication 81 MILLIGRAM(S): at 12:01

## 2020-02-20 RX ADMIN — Medication 3 MILLILITER(S): at 00:07

## 2020-02-20 RX ADMIN — OXYCODONE AND ACETAMINOPHEN 2 TABLET(S): 5; 325 TABLET ORAL at 07:00

## 2020-02-20 RX ADMIN — BUDESONIDE AND FORMOTEROL FUMARATE DIHYDRATE 2 PUFF(S): 160; 4.5 AEROSOL RESPIRATORY (INHALATION) at 17:26

## 2020-02-20 RX ADMIN — PANTOPRAZOLE SODIUM 40 MILLIGRAM(S): 20 TABLET, DELAYED RELEASE ORAL at 06:46

## 2020-02-20 RX ADMIN — ENOXAPARIN SODIUM 40 MILLIGRAM(S): 100 INJECTION SUBCUTANEOUS at 12:00

## 2020-02-20 RX ADMIN — OXYCODONE AND ACETAMINOPHEN 2 TABLET(S): 5; 325 TABLET ORAL at 12:13

## 2020-02-20 RX ADMIN — Medication 30 UNIT(S): at 17:25

## 2020-02-20 RX ADMIN — ATORVASTATIN CALCIUM 40 MILLIGRAM(S): 80 TABLET, FILM COATED ORAL at 21:28

## 2020-02-20 RX ADMIN — BUDESONIDE AND FORMOTEROL FUMARATE DIHYDRATE 2 PUFF(S): 160; 4.5 AEROSOL RESPIRATORY (INHALATION) at 06:46

## 2020-02-20 RX ADMIN — LISINOPRIL 2.5 MILLIGRAM(S): 2.5 TABLET ORAL at 06:45

## 2020-02-20 RX ADMIN — Medication 3 MILLILITER(S): at 12:00

## 2020-02-20 RX ADMIN — Medication 30 UNIT(S): at 09:09

## 2020-02-20 RX ADMIN — OXYCODONE AND ACETAMINOPHEN 2 TABLET(S): 5; 325 TABLET ORAL at 00:06

## 2020-02-20 RX ADMIN — Medication 20 MILLIGRAM(S): at 06:45

## 2020-02-20 RX ADMIN — Medication 3 MILLILITER(S): at 06:46

## 2020-02-20 RX ADMIN — NYSTATIN CREAM 1 APPLICATION(S): 100000 CREAM TOPICAL at 21:29

## 2020-02-20 RX ADMIN — RISPERIDONE 0.5 MILLIGRAM(S): 4 TABLET ORAL at 06:46

## 2020-02-20 RX ADMIN — Medication 650 MILLIGRAM(S): at 16:15

## 2020-02-20 RX ADMIN — RISPERIDONE 0.5 MILLIGRAM(S): 4 TABLET ORAL at 21:27

## 2020-02-20 RX ADMIN — Medication 650 MILLIGRAM(S): at 09:45

## 2020-02-20 RX ADMIN — Medication 30 UNIT(S): at 13:26

## 2020-02-20 RX ADMIN — Medication 3 MILLILITER(S): at 23:00

## 2020-02-20 RX ADMIN — OXYCODONE AND ACETAMINOPHEN 2 TABLET(S): 5; 325 TABLET ORAL at 13:00

## 2020-02-20 RX ADMIN — SERTRALINE 50 MILLIGRAM(S): 25 TABLET, FILM COATED ORAL at 21:28

## 2020-02-20 RX ADMIN — RISPERIDONE 0.5 MILLIGRAM(S): 4 TABLET ORAL at 13:26

## 2020-02-20 RX ADMIN — OXYCODONE AND ACETAMINOPHEN 2 TABLET(S): 5; 325 TABLET ORAL at 21:29

## 2020-02-20 RX ADMIN — NYSTATIN CREAM 1 APPLICATION(S): 100000 CREAM TOPICAL at 06:47

## 2020-02-20 NOTE — PROGRESS NOTE ADULT - SUBJECTIVE AND OBJECTIVE BOX
Patient is a 59y old  Male who presents with a chief complaint of 59M w/ n/v/d and hyperglycemia sent in from [facility] (20 Feb 2020 13:26)    pt. seen and examined, no c/o  awaiting placement to shelter     INTERVAL HPI/OVERNIGHT EVENTS:  T(C): 36.4 (02-20-20 @ 13:13), Max: 37.3 (02-20-20 @ 00:42)  HR: 82 (02-20-20 @ 13:13) (73 - 89)  BP: 100/69 (02-20-20 @ 13:13) (100/69 - 115/76)  RR: 18 (02-20-20 @ 13:13) (18 - 20)  SpO2: 99% (02-20-20 @ 13:13) (95% - 99%)  Wt(kg): --  I&O's Summary    19 Feb 2020 07:01  -  20 Feb 2020 07:00  --------------------------------------------------------  IN: 1155 mL / OUT: 1150 mL / NET: 5 mL    20 Feb 2020 07:01  -  20 Feb 2020 19:42  --------------------------------------------------------  IN: 640 mL / OUT: 1550 mL / NET: -910 mL        PAST MEDICAL & SURGICAL HISTORY:  Oxygen dependent  Gastroesophageal reflux disease, esophagitis presence not specified  Smoker  Bipolar 1 disorder  Coronary artery disease involving native coronary artery of native heart without angina pectoris  Type 2 diabetes mellitus without complication, with long-term current use of insulin  Pulmonary HTN  HLD (hyperlipidemia)  Stented coronary artery  COPD (chronic obstructive pulmonary disease)  No significant past surgical history      SOCIAL HISTORY  Alcohol:  Tobacco:  Illicit substance use:    FAMILY HISTORY:    REVIEW OF SYSTEMS:  CONSTITUTIONAL: No fever, weight loss, or fatigue  EYES: No eye pain, visual disturbances, or discharge  ENMT:  No difficulty hearing, tinnitus, vertigo; No sinus or throat pain  NECK: No pain or stiffness  RESPIRATORY: No cough, wheezing, chills or hemoptysis; No shortness of breath  CARDIOVASCULAR: No chest pain, palpitations, dizziness, or leg swelling  GASTROINTESTINAL: No abdominal or epigastric pain. No nausea, vomiting, or hematemesis; No diarrhea or constipation. No melena or hematochezia.  GENITOURINARY: No dysuria, frequency, hematuria, or incontinence  NEUROLOGICAL: No headaches, memory loss, loss of strength, numbness, or tremors  SKIN: No itching, burning, rashes, or lesions   LYMPH NODES: No enlarged glands  ENDOCRINE: No heat or cold intolerance; No hair loss  MUSCULOSKELETAL: No joint pain or swelling; No muscle, back, or extremity pain  PSYCHIATRIC: No depression, anxiety, mood swings, or difficulty sleeping  HEME/LYMPH: No easy bruising, or bleeding gums  ALLERY AND IMMUNOLOGIC: No hives or eczema    RADIOLOGY & ADDITIONAL TESTS:    Imaging Personally Reviewed:  [ ] YES  [ ] NO    Consultant(s) Notes Reviewed:  [ ] YES  [ ] NO    PHYSICAL EXAM:  GENERAL: NAD, well-groomed, well-developed  HEAD:  Atraumatic, Normocephalic  EYES: EOMI, PERRLA, conjunctiva and sclera clear  ENMT: No tonsillar erythema, exudates, or enlargement; Moist mucous membranes, Good dentition, No lesions  NECK: Supple, No JVD, Normal thyroid  NERVOUS SYSTEM:  Alert & Oriented X3, Good concentration; Motor Strength 5/5 B/L upper and lower extremities; DTRs 2+ intact and symmetric  CHEST/LUNG: Clear to percussion bilaterally; No rales, rhonchi, wheezing, or rubs  HEART: Regular rate and rhythm; No murmurs, rubs, or gallops  ABDOMEN: Soft, Nontender, Nondistended; Bowel sounds present  EXTREMITIES:  2+ Peripheral Pulses, No clubbing, cyanosis, or edema  LYMPH: No lymphadenopathy noted  SKIN: No rashes or lesions    LABS:              CAPILLARY BLOOD GLUCOSE      POCT Blood Glucose.: 132 mg/dL (20 Feb 2020 17:20)  POCT Blood Glucose.: 128 mg/dL (20 Feb 2020 12:34)  POCT Blood Glucose.: 169 mg/dL (20 Feb 2020 09:23)  POCT Blood Glucose.: 234 mg/dL (19 Feb 2020 21:39)            MEDICATIONS  (STANDING):  albuterol/ipratropium for Nebulization 3 milliLiter(s) Nebulizer every 6 hours  aspirin enteric coated 81 milliGRAM(s) Oral daily  atorvastatin 40 milliGRAM(s) Oral at bedtime  budesonide 160 MICROgram(s)/formoterol 4.5 MICROgram(s) Inhaler 2 Puff(s) Inhalation two times a day  dextrose 5%. 1000 milliLiter(s) (50 mL/Hr) IV Continuous <Continuous>  dextrose 50% Injectable 12.5 Gram(s) IV Push once  dextrose 50% Injectable 25 Gram(s) IV Push once  dextrose 50% Injectable 25 Gram(s) IV Push once  enoxaparin Injectable 40 milliGRAM(s) SubCutaneous daily  furosemide    Tablet 20 milliGRAM(s) Oral daily  insulin glargine Injectable (LANTUS) 75 Unit(s) SubCutaneous at bedtime  insulin lispro (HumaLOG) corrective regimen sliding scale   SubCutaneous three times a day before meals  insulin lispro (HumaLOG) corrective regimen sliding scale   SubCutaneous at bedtime  insulin lispro Injectable (HumaLOG) 30 Unit(s) SubCutaneous three times a day with meals  lisinopril 2.5 milliGRAM(s) Oral daily  melatonin 5 milliGRAM(s) Oral at bedtime  nicotine - Inhaler 1 Each Inhalation daily  nystatin Powder 1 Application(s) Topical two times a day  pantoprazole    Tablet 40 milliGRAM(s) Oral daily  polyethylene glycol 3350 17 Gram(s) Oral every 12 hours  risperiDONE   Tablet 0.5 milliGRAM(s) Oral three times a day  senna 2 Tablet(s) Oral at bedtime  sertraline 50 milliGRAM(s) Oral at bedtime    MEDICATIONS  (PRN):  acetaminophen   Tablet .. 650 milliGRAM(s) Oral every 6 hours PRN Mild Pain (1 - 3)  aluminum hydroxide/magnesium hydroxide/simethicone Suspension 30 milliLiter(s) Oral every 4 hours PRN Dyspepsia  bisacodyl 5 milliGRAM(s) Oral every 12 hours PRN Constipation  dextrose 40% Gel 15 Gram(s) Oral once PRN Blood Glucose LESS THAN 70 milliGRAM(s)/deciliter  glucagon  Injectable 1 milliGRAM(s) IntraMuscular once PRN Glucose LESS THAN 70 milligrams/deciliter  oxycodone    5 mG/acetaminophen 325 mG 2 Tablet(s) Oral every 6 hours PRN Moderate Pain (4 - 6)      Care Discussed with Consultants/Other Providers [ ] YES  [ ] NO

## 2020-02-20 NOTE — PROGRESS NOTE ADULT - ASSESSMENT
Assessment  DMT2: 59y Male with DM T2 with hyperglycemia, A1C 10.4%, on basal bolus insulin, blood sugars fluctuating, FS within overall acceptable range, no hypoglycemic episodes. Patient is eating full meals with good appetite, alert, resting comfortably this afternoon. Still awaiting placement to shelter. No current complaints.  CAD: on medications, no chest pain, stable, monitored.  HTN: Controlled,  on antihypertensive medications.  Overweight/Obesity: No strict exercise routines, not on any weight loss program,  neither on low  calorie diet.          Laury Romero MD  Cell: [phone]  Office: [phone] Assessment  DMT2: 59y Male with DM T2 with hyperglycemia, A1C 10.4%, on basal bolus insulin, blood sugars fluctuating, FS within overall acceptable range, no hypoglycemic episodes.  Patient is eating full meals with good appetite, alert, resting comfortably this afternoon. Still awaiting placement to shelter. No current complaints.  CAD: on medications, no chest pain, stable, monitored.  HTN: Controlled,  on antihypertensive medications.  Overweight/Obesity: No strict exercise routines, not on any weight loss program,  neither on low  calorie diet.          Laury Romero MD  Cell: [phone]  Office: [phone]

## 2020-02-20 NOTE — PROGRESS NOTE ADULT - PROBLEM SELECTOR PLAN 1
Will continue current insulin regimen for now. Will continue monitoring FS, log, and FU.  Suggest to DC on current insulin regimen and FU endo 4 weeks.  Discussed plan with patient. Counseled for compliance with consistent low carb diet and exercise as tolerated outpatient.

## 2020-02-20 NOTE — PROGRESS NOTE ADULT - SUBJECTIVE AND OBJECTIVE BOX
Chief complaint  Patient is a 59y old  Male who presents with a chief complaint of 59M w/ n/v/d and hyperglycemia sent in from [facility] (19 Feb 2020 21:04)   Review of systems  Patient in bed, looks comfortable, no hypoglycemia.    Labs and Fingersticks  CAPILLARY BLOOD GLUCOSE      POCT Blood Glucose.: 128 mg/dL (20 Feb 2020 12:34)  POCT Blood Glucose.: 169 mg/dL (20 Feb 2020 09:23)  POCT Blood Glucose.: 234 mg/dL (19 Feb 2020 21:39)  POCT Blood Glucose.: 166 mg/dL (19 Feb 2020 17:07)                        Medications  MEDICATIONS  (STANDING):  albuterol/ipratropium for Nebulization 3 milliLiter(s) Nebulizer every 6 hours  aspirin enteric coated 81 milliGRAM(s) Oral daily  atorvastatin 40 milliGRAM(s) Oral at bedtime  budesonide 160 MICROgram(s)/formoterol 4.5 MICROgram(s) Inhaler 2 Puff(s) Inhalation two times a day  dextrose 5%. 1000 milliLiter(s) (50 mL/Hr) IV Continuous <Continuous>  dextrose 50% Injectable 12.5 Gram(s) IV Push once  dextrose 50% Injectable 25 Gram(s) IV Push once  dextrose 50% Injectable 25 Gram(s) IV Push once  enoxaparin Injectable 40 milliGRAM(s) SubCutaneous daily  furosemide    Tablet 20 milliGRAM(s) Oral daily  insulin glargine Injectable (LANTUS) 75 Unit(s) SubCutaneous at bedtime  insulin lispro (HumaLOG) corrective regimen sliding scale   SubCutaneous three times a day before meals  insulin lispro (HumaLOG) corrective regimen sliding scale   SubCutaneous at bedtime  insulin lispro Injectable (HumaLOG) 30 Unit(s) SubCutaneous three times a day with meals  lisinopril 2.5 milliGRAM(s) Oral daily  melatonin 5 milliGRAM(s) Oral at bedtime  nicotine - Inhaler 1 Each Inhalation daily  nystatin Powder 1 Application(s) Topical two times a day  pantoprazole    Tablet 40 milliGRAM(s) Oral daily  polyethylene glycol 3350 17 Gram(s) Oral every 12 hours  risperiDONE   Tablet 0.5 milliGRAM(s) Oral three times a day  senna 2 Tablet(s) Oral at bedtime  sertraline 50 milliGRAM(s) Oral at bedtime      Physical Exam  General: Patient comfortable in bed  Vital Signs Last 12 Hrs  T(F): 97.5 (02-20-20 @ 13:13), Max: 97.9 (02-20-20 @ 06:44)  HR: 82 (02-20-20 @ 13:13) (73 - 82)  BP: 100/69 (02-20-20 @ 13:13) (100/69 - 115/76)  BP(mean): --  RR: 18 (02-20-20 @ 13:13) (18 - 20)  SpO2: 99% (02-20-20 @ 13:13) (97% - 99%)  Neck: No palpable thyroid nodules.  CVS: S1S2, No murmurs  Respiratory: No wheezing, no crepitations  GI: Abdomen soft, bowel sounds positive  Musculoskeletal:  edema lower extremities.   Skin: No skin rashes, no ecchymosis    Diagnostics Chief complaint  Patient is a 59y old  Male who presents with a chief complaint of 59M w/ n/v/d and hyperglycemia sent in from [facility] (19 Feb 2020 21:04)   Review of systems  Patient in bed, looks comfortable,  no hypoglycemia.    Labs and Fingersticks  CAPILLARY BLOOD GLUCOSE      POCT Blood Glucose.: 128 mg/dL (20 Feb 2020 12:34)  POCT Blood Glucose.: 169 mg/dL (20 Feb 2020 09:23)  POCT Blood Glucose.: 234 mg/dL (19 Feb 2020 21:39)  POCT Blood Glucose.: 166 mg/dL (19 Feb 2020 17:07)    Medications  MEDICATIONS  (STANDING):  albuterol/ipratropium for Nebulization 3 milliLiter(s) Nebulizer every 6 hours  aspirin enteric coated 81 milliGRAM(s) Oral daily  atorvastatin 40 milliGRAM(s) Oral at bedtime  budesonide 160 MICROgram(s)/formoterol 4.5 MICROgram(s) Inhaler 2 Puff(s) Inhalation two times a day  dextrose 5%. 1000 milliLiter(s) (50 mL/Hr) IV Continuous <Continuous>  dextrose 50% Injectable 12.5 Gram(s) IV Push once  dextrose 50% Injectable 25 Gram(s) IV Push once  dextrose 50% Injectable 25 Gram(s) IV Push once  enoxaparin Injectable 40 milliGRAM(s) SubCutaneous daily  furosemide    Tablet 20 milliGRAM(s) Oral daily  insulin glargine Injectable (LANTUS) 75 Unit(s) SubCutaneous at bedtime  insulin lispro (HumaLOG) corrective regimen sliding scale   SubCutaneous three times a day before meals  insulin lispro (HumaLOG) corrective regimen sliding scale   SubCutaneous at bedtime  insulin lispro Injectable (HumaLOG) 30 Unit(s) SubCutaneous three times a day with meals  lisinopril 2.5 milliGRAM(s) Oral daily  melatonin 5 milliGRAM(s) Oral at bedtime  nicotine - Inhaler 1 Each Inhalation daily  nystatin Powder 1 Application(s) Topical two times a day  pantoprazole    Tablet 40 milliGRAM(s) Oral daily  polyethylene glycol 3350 17 Gram(s) Oral every 12 hours  risperiDONE   Tablet 0.5 milliGRAM(s) Oral three times a day  senna 2 Tablet(s) Oral at bedtime  sertraline 50 milliGRAM(s) Oral at bedtime      Physical Exam  General: Patient comfortable in bed  Vital Signs Last 12 Hrs  T(F): 97.5 (02-20-20 @ 13:13), Max: 97.9 (02-20-20 @ 06:44)  HR: 82 (02-20-20 @ 13:13) (73 - 82)  BP: 100/69 (02-20-20 @ 13:13) (100/69 - 115/76)  BP(mean): --  RR: 18 (02-20-20 @ 13:13) (18 - 20)  SpO2: 99% (02-20-20 @ 13:13) (97% - 99%)  Neck: No palpable thyroid nodules.  CVS: S1S2, No murmurs  Respiratory: No wheezing, no crepitations  GI: Abdomen soft, bowel sounds positive  Musculoskeletal:  edema lower extremities.   Skin: No skin rashes, no ecchymosis    Diagnostics

## 2020-02-21 LAB
GLUCOSE BLDC GLUCOMTR-MCNC: 149 MG/DL — HIGH (ref 70–99)
GLUCOSE BLDC GLUCOMTR-MCNC: 164 MG/DL — HIGH (ref 70–99)
GLUCOSE BLDC GLUCOMTR-MCNC: 187 MG/DL — HIGH (ref 70–99)
GLUCOSE BLDC GLUCOMTR-MCNC: 232 MG/DL — HIGH (ref 70–99)

## 2020-02-21 RX ADMIN — OXYCODONE AND ACETAMINOPHEN 2 TABLET(S): 5; 325 TABLET ORAL at 12:30

## 2020-02-21 RX ADMIN — Medication 81 MILLIGRAM(S): at 11:56

## 2020-02-21 RX ADMIN — Medication 3 MILLILITER(S): at 17:54

## 2020-02-21 RX ADMIN — ATORVASTATIN CALCIUM 40 MILLIGRAM(S): 80 TABLET, FILM COATED ORAL at 21:23

## 2020-02-21 RX ADMIN — Medication 30 UNIT(S): at 08:46

## 2020-02-21 RX ADMIN — Medication 30 UNIT(S): at 14:17

## 2020-02-21 RX ADMIN — NYSTATIN CREAM 1 APPLICATION(S): 100000 CREAM TOPICAL at 05:31

## 2020-02-21 RX ADMIN — RISPERIDONE 0.5 MILLIGRAM(S): 4 TABLET ORAL at 05:29

## 2020-02-21 RX ADMIN — RISPERIDONE 0.5 MILLIGRAM(S): 4 TABLET ORAL at 21:22

## 2020-02-21 RX ADMIN — Medication 1: at 17:53

## 2020-02-21 RX ADMIN — RISPERIDONE 0.5 MILLIGRAM(S): 4 TABLET ORAL at 13:07

## 2020-02-21 RX ADMIN — OXYCODONE AND ACETAMINOPHEN 2 TABLET(S): 5; 325 TABLET ORAL at 05:29

## 2020-02-21 RX ADMIN — Medication 650 MILLIGRAM(S): at 18:36

## 2020-02-21 RX ADMIN — OXYCODONE AND ACETAMINOPHEN 2 TABLET(S): 5; 325 TABLET ORAL at 06:29

## 2020-02-21 RX ADMIN — Medication 650 MILLIGRAM(S): at 08:46

## 2020-02-21 RX ADMIN — Medication 20 MILLIGRAM(S): at 05:29

## 2020-02-21 RX ADMIN — Medication 30 UNIT(S): at 17:54

## 2020-02-21 RX ADMIN — Medication 1: at 08:46

## 2020-02-21 RX ADMIN — Medication 3 MILLILITER(S): at 11:57

## 2020-02-21 RX ADMIN — Medication 650 MILLIGRAM(S): at 09:30

## 2020-02-21 RX ADMIN — Medication 650 MILLIGRAM(S): at 17:54

## 2020-02-21 RX ADMIN — OXYCODONE AND ACETAMINOPHEN 2 TABLET(S): 5; 325 TABLET ORAL at 11:56

## 2020-02-21 RX ADMIN — Medication 3 MILLILITER(S): at 23:19

## 2020-02-21 RX ADMIN — SERTRALINE 50 MILLIGRAM(S): 25 TABLET, FILM COATED ORAL at 21:22

## 2020-02-21 RX ADMIN — NYSTATIN CREAM 1 APPLICATION(S): 100000 CREAM TOPICAL at 21:22

## 2020-02-21 RX ADMIN — Medication 5 MILLIGRAM(S): at 21:23

## 2020-02-21 RX ADMIN — Medication 3 MILLILITER(S): at 05:30

## 2020-02-21 RX ADMIN — BUDESONIDE AND FORMOTEROL FUMARATE DIHYDRATE 2 PUFF(S): 160; 4.5 AEROSOL RESPIRATORY (INHALATION) at 05:30

## 2020-02-21 RX ADMIN — INSULIN GLARGINE 75 UNIT(S): 100 INJECTION, SOLUTION SUBCUTANEOUS at 22:32

## 2020-02-21 RX ADMIN — LISINOPRIL 2.5 MILLIGRAM(S): 2.5 TABLET ORAL at 05:29

## 2020-02-21 RX ADMIN — ENOXAPARIN SODIUM 40 MILLIGRAM(S): 100 INJECTION SUBCUTANEOUS at 11:56

## 2020-02-21 RX ADMIN — PANTOPRAZOLE SODIUM 40 MILLIGRAM(S): 20 TABLET, DELAYED RELEASE ORAL at 05:29

## 2020-02-21 NOTE — PROGRESS NOTE ADULT - SUBJECTIVE AND OBJECTIVE BOX
Patient is a 59y old  Male who presents with a chief complaint of 59M w/ n/v/d and hyperglycemia sent in from [facility] (21 Feb 2020 14:22)      INTERVAL HPI/OVERNIGHT EVENTS:  T(C): 36.6 (02-21-20 @ 22:26), Max: 37.3 (02-21-20 @ 00:04)  HR: 87 (02-21-20 @ 22:26) (76 - 87)  BP: 104/66 (02-21-20 @ 22:26) (104/66 - 119/71)  RR: 16 (02-21-20 @ 22:26) (16 - 20)  SpO2: 97% (02-21-20 @ 22:26) (95% - 97%)  Wt(kg): --  I&O's Summary    20 Feb 2020 07:01  -  21 Feb 2020 07:00  --------------------------------------------------------  IN: 740 mL / OUT: 2350 mL / NET: -1610 mL    21 Feb 2020 07:01  -  21 Feb 2020 23:28  --------------------------------------------------------  IN: 480 mL / OUT: 950 mL / NET: -470 mL        PAST MEDICAL & SURGICAL HISTORY:  Oxygen dependent  Gastroesophageal reflux disease, esophagitis presence not specified  Smoker  Bipolar 1 disorder  Coronary artery disease involving native coronary artery of native heart without angina pectoris  Type 2 diabetes mellitus without complication, with long-term current use of insulin  Pulmonary HTN  HLD (hyperlipidemia)  Stented coronary artery  COPD (chronic obstructive pulmonary disease)  No significant past surgical history      SOCIAL HISTORY  Alcohol:  Tobacco:  Illicit substance use:    FAMILY HISTORY:    REVIEW OF SYSTEMS:  CONSTITUTIONAL: No fever, weight loss, or fatigue  EYES: No eye pain, visual disturbances, or discharge  ENMT:  No difficulty hearing, tinnitus, vertigo; No sinus or throat pain  NECK: No pain or stiffness  RESPIRATORY: No cough, wheezing, chills or hemoptysis; No shortness of breath  CARDIOVASCULAR: No chest pain, palpitations, dizziness, or leg swelling  GASTROINTESTINAL: No abdominal or epigastric pain. No nausea, vomiting, or hematemesis; No diarrhea or constipation. No melena or hematochezia.  GENITOURINARY: No dysuria, frequency, hematuria, or incontinence  NEUROLOGICAL: No headaches, memory loss, loss of strength, numbness, or tremors  SKIN: No itching, burning, rashes, or lesions   LYMPH NODES: No enlarged glands  ENDOCRINE: No heat or cold intolerance; No hair loss  MUSCULOSKELETAL: No joint pain or swelling; No muscle, back, or extremity pain  PSYCHIATRIC: No depression, anxiety, mood swings, or difficulty sleeping  HEME/LYMPH: No easy bruising, or bleeding gums  ALLERY AND IMMUNOLOGIC: No hives or eczema    RADIOLOGY & ADDITIONAL TESTS:    Imaging Personally Reviewed:  [ ] YES  [ ] NO    Consultant(s) Notes Reviewed:  [ ] YES  [ ] NO    PHYSICAL EXAM:  GENERAL: NAD, well-groomed, well-developed  HEAD:  Atraumatic, Normocephalic  EYES: EOMI, PERRLA, conjunctiva and sclera clear  ENMT: No tonsillar erythema, exudates, or enlargement; Moist mucous membranes, Good dentition, No lesions  NECK: Supple, No JVD, Normal thyroid  NERVOUS SYSTEM:  Alert & Oriented X3, Good concentration; Motor Strength 5/5 B/L upper and lower extremities; DTRs 2+ intact and symmetric  CHEST/LUNG: Clear to percussion bilaterally; No rales, rhonchi, wheezing, or rubs  HEART: Regular rate and rhythm; No murmurs, rubs, or gallops  ABDOMEN: Soft, Nontender, Nondistended; Bowel sounds present  EXTREMITIES:  2+ Peripheral Pulses, No clubbing, cyanosis, or edema  LYMPH: No lymphadenopathy noted  SKIN: No rashes or lesions    LABS:              CAPILLARY BLOOD GLUCOSE      POCT Blood Glucose.: 232 mg/dL (21 Feb 2020 22:17)  POCT Blood Glucose.: 164 mg/dL (21 Feb 2020 17:20)  POCT Blood Glucose.: 149 mg/dL (21 Feb 2020 12:29)  POCT Blood Glucose.: 187 mg/dL (21 Feb 2020 08:41)            MEDICATIONS  (STANDING):  albuterol/ipratropium for Nebulization 3 milliLiter(s) Nebulizer every 6 hours  aspirin enteric coated 81 milliGRAM(s) Oral daily  atorvastatin 40 milliGRAM(s) Oral at bedtime  budesonide 160 MICROgram(s)/formoterol 4.5 MICROgram(s) Inhaler 2 Puff(s) Inhalation two times a day  dextrose 5%. 1000 milliLiter(s) (50 mL/Hr) IV Continuous <Continuous>  dextrose 50% Injectable 12.5 Gram(s) IV Push once  dextrose 50% Injectable 25 Gram(s) IV Push once  dextrose 50% Injectable 25 Gram(s) IV Push once  enoxaparin Injectable 40 milliGRAM(s) SubCutaneous daily  furosemide    Tablet 20 milliGRAM(s) Oral daily  insulin glargine Injectable (LANTUS) 75 Unit(s) SubCutaneous at bedtime  insulin lispro (HumaLOG) corrective regimen sliding scale   SubCutaneous three times a day before meals  insulin lispro (HumaLOG) corrective regimen sliding scale   SubCutaneous at bedtime  insulin lispro Injectable (HumaLOG) 30 Unit(s) SubCutaneous three times a day with meals  lisinopril 2.5 milliGRAM(s) Oral daily  melatonin 5 milliGRAM(s) Oral at bedtime  nicotine - Inhaler 1 Each Inhalation daily  nystatin Powder 1 Application(s) Topical two times a day  pantoprazole    Tablet 40 milliGRAM(s) Oral daily  polyethylene glycol 3350 17 Gram(s) Oral every 12 hours  risperiDONE   Tablet 0.5 milliGRAM(s) Oral three times a day  senna 2 Tablet(s) Oral at bedtime  sertraline 50 milliGRAM(s) Oral at bedtime    MEDICATIONS  (PRN):  acetaminophen   Tablet .. 650 milliGRAM(s) Oral every 6 hours PRN Mild Pain (1 - 3)  aluminum hydroxide/magnesium hydroxide/simethicone Suspension 30 milliLiter(s) Oral every 4 hours PRN Dyspepsia  bisacodyl 5 milliGRAM(s) Oral every 12 hours PRN Constipation  dextrose 40% Gel 15 Gram(s) Oral once PRN Blood Glucose LESS THAN 70 milliGRAM(s)/deciliter  glucagon  Injectable 1 milliGRAM(s) IntraMuscular once PRN Glucose LESS THAN 70 milligrams/deciliter  oxycodone    5 mG/acetaminophen 325 mG 2 Tablet(s) Oral every 6 hours PRN Moderate Pain (4 - 6)      Care Discussed with Consultants/Other Providers [ ] YES  [ ] NO

## 2020-02-21 NOTE — PROGRESS NOTE ADULT - PROBLEM SELECTOR PLAN 5
seen by psych   -don't think he has schizophrenia  -think he has Bipolar d/o  -recommend zoloft 50 mg daily
Cont current meds: no chest pain  1/10: stable: no chest pain
Cont current meds: no chest pain  1/10: stable: no chest pain  1/11 no acute issue. on aspirin
Cont current meds: no chest pain  1/10: stable: no chest pain  1/11 no acute issue. on aspirin  1/12 no acute issue. on ASA
Cont current meds: no chest pain  1/10: stable: no chest pain  1/11 no acute issue. on aspirin  1/12 no acute issue. on ASA  1/13: stable
TOV
TOV
per heide team
per heide team  1/7: TOV per PMD
pt agrees to attempt TOV Monday 1/6/19
pt agrees to attempt TOV Monday 1/6/19
seen by psych   -don't think he has schizophrenia  -think he has Bipolar d/o  -recommend zoloft 50 mg daily

## 2020-02-21 NOTE — PROGRESS NOTE ADULT - PROBLEM SELECTOR PROBLEM 5
Bipolar 1 disorder
Coronary artery disease involving native coronary artery of native heart without angina pectoris
Urinary retention

## 2020-02-21 NOTE — PROGRESS NOTE ADULT - ASSESSMENT
Assessment  DMT2: 59y Male with DM T2 with hyperglycemia, A1C 10.4%, on basal bolus insulin, blood sugars improving, FS within overall acceptable range, no hypoglycemic episodes.  Patient is eating full meals with good appetite, alert, comfortable, no acute events. Patient is awaiting placement to shelter. Social work FU noted.  CAD: on medications, no chest pain, stable, monitored.  HTN: Controlled,  on antihypertensive medications.  Overweight/Obesity: No strict exercise routines, not on any weight loss program,  neither on low  calorie diet.          Laury Romero MD  Cell: [phone]  Office: [phone] Assessment  DMT2: 59y Male with DM T2 with hyperglycemia, A1C 10.4%, on basal bolus insulin, blood sugars improving, FS within overall acceptable range, no hypoglycemic episodes.  Patient is eating full meals with good appetite, alert, comfortable, no acute events.  Patient is awaiting placement to shelter. Social work FU noted.  CAD: on medications, no chest pain, stable, monitored.  HTN: Controlled,  on antihypertensive medications.  Overweight/Obesity: No strict exercise routines, not on any weight loss program,  neither on low  calorie diet.          Laury Romero MD  Cell: [phone]  Office: [phone]

## 2020-02-21 NOTE — PROGRESS NOTE ADULT - SUBJECTIVE AND OBJECTIVE BOX
Chief complaint  Patient is a 59y old  Male who presents with a chief complaint of 59M w/ n/v/d and hyperglycemia sent in from [facility] (20 Feb 2020 19:42)   Review of systems  Patient in bed, looks comfortable, no hypoglycemia.    Labs and Fingersticks  CAPILLARY BLOOD GLUCOSE      POCT Blood Glucose.: 149 mg/dL (21 Feb 2020 12:29)  POCT Blood Glucose.: 187 mg/dL (21 Feb 2020 08:41)  POCT Blood Glucose.: 180 mg/dL (20 Feb 2020 22:08)  POCT Blood Glucose.: 132 mg/dL (20 Feb 2020 17:20)                        Medications  MEDICATIONS  (STANDING):  albuterol/ipratropium for Nebulization 3 milliLiter(s) Nebulizer every 6 hours  aspirin enteric coated 81 milliGRAM(s) Oral daily  atorvastatin 40 milliGRAM(s) Oral at bedtime  budesonide 160 MICROgram(s)/formoterol 4.5 MICROgram(s) Inhaler 2 Puff(s) Inhalation two times a day  dextrose 5%. 1000 milliLiter(s) (50 mL/Hr) IV Continuous <Continuous>  dextrose 50% Injectable 12.5 Gram(s) IV Push once  dextrose 50% Injectable 25 Gram(s) IV Push once  dextrose 50% Injectable 25 Gram(s) IV Push once  enoxaparin Injectable 40 milliGRAM(s) SubCutaneous daily  furosemide    Tablet 20 milliGRAM(s) Oral daily  insulin glargine Injectable (LANTUS) 75 Unit(s) SubCutaneous at bedtime  insulin lispro (HumaLOG) corrective regimen sliding scale   SubCutaneous three times a day before meals  insulin lispro (HumaLOG) corrective regimen sliding scale   SubCutaneous at bedtime  insulin lispro Injectable (HumaLOG) 30 Unit(s) SubCutaneous three times a day with meals  lisinopril 2.5 milliGRAM(s) Oral daily  melatonin 5 milliGRAM(s) Oral at bedtime  nicotine - Inhaler 1 Each Inhalation daily  nystatin Powder 1 Application(s) Topical two times a day  pantoprazole    Tablet 40 milliGRAM(s) Oral daily  polyethylene glycol 3350 17 Gram(s) Oral every 12 hours  risperiDONE   Tablet 0.5 milliGRAM(s) Oral three times a day  senna 2 Tablet(s) Oral at bedtime  sertraline 50 milliGRAM(s) Oral at bedtime      Physical Exam  General: Patient comfortable in bed  Vital Signs Last 12 Hrs  T(F): 98.2 (02-21-20 @ 11:12), Max: 98.2 (02-21-20 @ 11:12)  HR: 76 (02-21-20 @ 11:12) (76 - 78)  BP: 119/71 (02-21-20 @ 11:12) (112/78 - 119/71)  BP(mean): --  RR: 18 (02-21-20 @ 11:12) (18 - 20)  SpO2: 96% (02-21-20 @ 11:12) (95% - 96%)  Neck: No palpable thyroid nodules.  CVS: S1S2, No murmurs  Respiratory: No wheezing, no crepitations  GI: Abdomen soft, bowel sounds positive  Musculoskeletal:  edema lower extremities.   Skin: No skin rashes, no ecchymosis    Diagnostics Chief complaint  Patient is a 59y old  Male who presents with a chief complaint of 59M w/ n/v/d and hyperglycemia sent in from [facility] (20 Feb 2020 19:42)   Review of systems  Patient in bed, looks comfortable,  no hypoglycemia.    Labs and Fingersticks  CAPILLARY BLOOD GLUCOSE      POCT Blood Glucose.: 149 mg/dL (21 Feb 2020 12:29)  POCT Blood Glucose.: 187 mg/dL (21 Feb 2020 08:41)  POCT Blood Glucose.: 180 mg/dL (20 Feb 2020 22:08)  POCT Blood Glucose.: 132 mg/dL (20 Feb 2020 17:20)                        Medications  MEDICATIONS  (STANDING):  albuterol/ipratropium for Nebulization 3 milliLiter(s) Nebulizer every 6 hours  aspirin enteric coated 81 milliGRAM(s) Oral daily  atorvastatin 40 milliGRAM(s) Oral at bedtime  budesonide 160 MICROgram(s)/formoterol 4.5 MICROgram(s) Inhaler 2 Puff(s) Inhalation two times a day  dextrose 5%. 1000 milliLiter(s) (50 mL/Hr) IV Continuous <Continuous>  dextrose 50% Injectable 12.5 Gram(s) IV Push once  dextrose 50% Injectable 25 Gram(s) IV Push once  dextrose 50% Injectable 25 Gram(s) IV Push once  enoxaparin Injectable 40 milliGRAM(s) SubCutaneous daily  furosemide    Tablet 20 milliGRAM(s) Oral daily  insulin glargine Injectable (LANTUS) 75 Unit(s) SubCutaneous at bedtime  insulin lispro (HumaLOG) corrective regimen sliding scale   SubCutaneous three times a day before meals  insulin lispro (HumaLOG) corrective regimen sliding scale   SubCutaneous at bedtime  insulin lispro Injectable (HumaLOG) 30 Unit(s) SubCutaneous three times a day with meals  lisinopril 2.5 milliGRAM(s) Oral daily  melatonin 5 milliGRAM(s) Oral at bedtime  nicotine - Inhaler 1 Each Inhalation daily  nystatin Powder 1 Application(s) Topical two times a day  pantoprazole    Tablet 40 milliGRAM(s) Oral daily  polyethylene glycol 3350 17 Gram(s) Oral every 12 hours  risperiDONE   Tablet 0.5 milliGRAM(s) Oral three times a day  senna 2 Tablet(s) Oral at bedtime  sertraline 50 milliGRAM(s) Oral at bedtime      Physical Exam  General: Patient comfortable in bed  Vital Signs Last 12 Hrs  T(F): 98.2 (02-21-20 @ 11:12), Max: 98.2 (02-21-20 @ 11:12)  HR: 76 (02-21-20 @ 11:12) (76 - 78)  BP: 119/71 (02-21-20 @ 11:12) (112/78 - 119/71)  BP(mean): --  RR: 18 (02-21-20 @ 11:12) (18 - 20)  SpO2: 96% (02-21-20 @ 11:12) (95% - 96%)  Neck: No palpable thyroid nodules.  CVS: S1S2, No murmurs  Respiratory: No wheezing, no crepitations  GI: Abdomen soft, bowel sounds positive  Musculoskeletal:  edema lower extremities.   Skin: No skin rashes, no ecchymosis    Diagnostics

## 2020-02-22 DIAGNOSIS — F31.30 BIPOLAR DISORDER, CURRENT EPISODE DEPRESSED, MILD OR MODERATE SEVERITY, UNSPECIFIED: ICD-10-CM

## 2020-02-22 DIAGNOSIS — E11.9 TYPE 2 DIABETES MELLITUS WITHOUT COMPLICATIONS: ICD-10-CM

## 2020-02-22 DIAGNOSIS — E11.10 TYPE 2 DIABETES MELLITUS WITH KETOACIDOSIS WITHOUT COMA: ICD-10-CM

## 2020-02-22 LAB
GLUCOSE BLDC GLUCOMTR-MCNC: 123 MG/DL — HIGH (ref 70–99)
GLUCOSE BLDC GLUCOMTR-MCNC: 129 MG/DL — HIGH (ref 70–99)
GLUCOSE BLDC GLUCOMTR-MCNC: 169 MG/DL — HIGH (ref 70–99)
GLUCOSE BLDC GLUCOMTR-MCNC: 200 MG/DL — HIGH (ref 70–99)

## 2020-02-22 RX ADMIN — SERTRALINE 50 MILLIGRAM(S): 25 TABLET, FILM COATED ORAL at 22:25

## 2020-02-22 RX ADMIN — Medication 650 MILLIGRAM(S): at 13:20

## 2020-02-22 RX ADMIN — Medication 30 UNIT(S): at 08:58

## 2020-02-22 RX ADMIN — Medication 30 UNIT(S): at 18:26

## 2020-02-22 RX ADMIN — OXYCODONE AND ACETAMINOPHEN 2 TABLET(S): 5; 325 TABLET ORAL at 10:01

## 2020-02-22 RX ADMIN — Medication 30 UNIT(S): at 13:25

## 2020-02-22 RX ADMIN — OXYCODONE AND ACETAMINOPHEN 2 TABLET(S): 5; 325 TABLET ORAL at 20:29

## 2020-02-22 RX ADMIN — Medication 3 MILLILITER(S): at 23:43

## 2020-02-22 RX ADMIN — RISPERIDONE 0.5 MILLIGRAM(S): 4 TABLET ORAL at 13:25

## 2020-02-22 RX ADMIN — Medication 81 MILLIGRAM(S): at 12:15

## 2020-02-22 RX ADMIN — NYSTATIN CREAM 1 APPLICATION(S): 100000 CREAM TOPICAL at 06:22

## 2020-02-22 RX ADMIN — ATORVASTATIN CALCIUM 40 MILLIGRAM(S): 80 TABLET, FILM COATED ORAL at 22:25

## 2020-02-22 RX ADMIN — ENOXAPARIN SODIUM 40 MILLIGRAM(S): 100 INJECTION SUBCUTANEOUS at 12:15

## 2020-02-22 RX ADMIN — OXYCODONE AND ACETAMINOPHEN 2 TABLET(S): 5; 325 TABLET ORAL at 18:34

## 2020-02-22 RX ADMIN — LISINOPRIL 2.5 MILLIGRAM(S): 2.5 TABLET ORAL at 06:27

## 2020-02-22 RX ADMIN — Medication 5 MILLIGRAM(S): at 22:25

## 2020-02-22 RX ADMIN — NYSTATIN CREAM 1 APPLICATION(S): 100000 CREAM TOPICAL at 22:26

## 2020-02-22 RX ADMIN — Medication 1: at 08:59

## 2020-02-22 RX ADMIN — PANTOPRAZOLE SODIUM 40 MILLIGRAM(S): 20 TABLET, DELAYED RELEASE ORAL at 06:27

## 2020-02-22 RX ADMIN — OXYCODONE AND ACETAMINOPHEN 2 TABLET(S): 5; 325 TABLET ORAL at 09:05

## 2020-02-22 RX ADMIN — RISPERIDONE 0.5 MILLIGRAM(S): 4 TABLET ORAL at 22:25

## 2020-02-22 RX ADMIN — Medication 650 MILLIGRAM(S): at 12:20

## 2020-02-22 RX ADMIN — Medication 20 MILLIGRAM(S): at 06:27

## 2020-02-22 RX ADMIN — RISPERIDONE 0.5 MILLIGRAM(S): 4 TABLET ORAL at 06:27

## 2020-02-22 RX ADMIN — Medication 1: at 18:27

## 2020-02-22 RX ADMIN — INSULIN GLARGINE 75 UNIT(S): 100 INJECTION, SOLUTION SUBCUTANEOUS at 22:25

## 2020-02-22 RX ADMIN — Medication 3 MILLILITER(S): at 18:26

## 2020-02-22 RX ADMIN — BUDESONIDE AND FORMOTEROL FUMARATE DIHYDRATE 2 PUFF(S): 160; 4.5 AEROSOL RESPIRATORY (INHALATION) at 18:27

## 2020-02-22 RX ADMIN — BUDESONIDE AND FORMOTEROL FUMARATE DIHYDRATE 2 PUFF(S): 160; 4.5 AEROSOL RESPIRATORY (INHALATION) at 06:26

## 2020-02-22 RX ADMIN — Medication 3 MILLILITER(S): at 06:26

## 2020-02-22 RX ADMIN — Medication 3 MILLILITER(S): at 12:15

## 2020-02-22 NOTE — PROGRESS NOTE ADULT - ASSESSMENT
Assessment  DMT2: 59y Male with DM T2 with hyperglycemia, A1C 10.4%, on basal bolus insulin, blood sugars improving, no hypoglycemic episodes.  Patient is eating full meals with good appetite, alert, comfortable, no acute events.  Patient is awaiting placement to shelter. Social work FU noted.  CAD: on medications, no chest pain, stable, monitored.  HTN: Controlled,  on antihypertensive medications.  Overweight/Obesity: No strict exercise routines, not on any weight loss program,  neither on low  calorie diet.          Laury Romero MD  Cell: [phone]  Office: [phone]

## 2020-02-22 NOTE — PROGRESS NOTE ADULT - SUBJECTIVE AND OBJECTIVE BOX
Patient is a 59y old  Male who presents with a chief complaint of 59M w/ n/v/d and hyperglycemia sent in from [facility] (22 Feb 2020 17:39)    pt. seen and examined , no c/o    INTERVAL HPI/OVERNIGHT EVENTS:  T(C): 36.6 (02-22-20 @ 21:02), Max: 36.7 (02-22-20 @ 06:28)  HR: 68 (02-22-20 @ 21:02) (68 - 84)  BP: 125/78 (02-22-20 @ 21:02) (101/72 - 125/78)  RR: 18 (02-22-20 @ 21:02) (17 - 18)  SpO2: 98% (02-22-20 @ 21:02) (96% - 98%)  Wt(kg): --  I&O's Summary    21 Feb 2020 07:01  -  22 Feb 2020 07:00  --------------------------------------------------------  IN: 720 mL / OUT: 950 mL / NET: -230 mL    22 Feb 2020 07:01  -  22 Feb 2020 22:43  --------------------------------------------------------  IN: 1240 mL / OUT: 2200 mL / NET: -960 mL        PAST MEDICAL & SURGICAL HISTORY:  Oxygen dependent  Gastroesophageal reflux disease, esophagitis presence not specified  Smoker  Bipolar 1 disorder  Coronary artery disease involving native coronary artery of native heart without angina pectoris  Type 2 diabetes mellitus without complication, with long-term current use of insulin  Pulmonary HTN  HLD (hyperlipidemia)  Stented coronary artery  COPD (chronic obstructive pulmonary disease)  No significant past surgical history      SOCIAL HISTORY  Alcohol:  Tobacco:  Illicit substance use:    FAMILY HISTORY:    REVIEW OF SYSTEMS:  CONSTITUTIONAL: No fever, weight loss, or fatigue  EYES: No eye pain, visual disturbances, or discharge  ENMT:  No difficulty hearing, tinnitus, vertigo; No sinus or throat pain  NECK: No pain or stiffness  RESPIRATORY: No cough, wheezing, chills or hemoptysis; No shortness of breath  CARDIOVASCULAR: No chest pain, palpitations, dizziness, or leg swelling  GASTROINTESTINAL: No abdominal or epigastric pain. No nausea, vomiting, or hematemesis; No diarrhea or constipation. No melena or hematochezia.  GENITOURINARY: No dysuria, frequency, hematuria, or incontinence  NEUROLOGICAL: No headaches, memory loss, loss of strength, numbness, or tremors  SKIN: No itching, burning, rashes, or lesions   LYMPH NODES: No enlarged glands  ENDOCRINE: No heat or cold intolerance; No hair loss  MUSCULOSKELETAL: No joint pain or swelling; No muscle, back, or extremity pain  PSYCHIATRIC: No depression, anxiety, mood swings, or difficulty sleeping  HEME/LYMPH: No easy bruising, or bleeding gums  ALLERY AND IMMUNOLOGIC: No hives or eczema    RADIOLOGY & ADDITIONAL TESTS:    Imaging Personally Reviewed:  [ ] YES  [ ] NO    Consultant(s) Notes Reviewed:  [ ] YES  [ ] NO    PHYSICAL EXAM:  GENERAL: NAD, well-groomed, well-developed  HEAD:  Atraumatic, Normocephalic  EYES: EOMI, PERRLA, conjunctiva and sclera clear  ENMT: No tonsillar erythema, exudates, or enlargement; Moist mucous membranes, Good dentition, No lesions  NECK: Supple, No JVD, Normal thyroid  NERVOUS SYSTEM:  Alert & Oriented X3, Good concentration; Motor Strength 5/5 B/L upper and lower extremities; DTRs 2+ intact and symmetric  CHEST/LUNG: Clear to percussion bilaterally; No rales, rhonchi, wheezing, or rubs  HEART: Regular rate and rhythm; No murmurs, rubs, or gallops  ABDOMEN: Soft, Nontender, Nondistended; Bowel sounds present  EXTREMITIES:  2+ Peripheral Pulses, No clubbing, cyanosis, or edema  LYMPH: No lymphadenopathy noted  SKIN: No rashes or lesions    LABS:              CAPILLARY BLOOD GLUCOSE      POCT Blood Glucose.: 129 mg/dL (22 Feb 2020 22:23)  POCT Blood Glucose.: 169 mg/dL (22 Feb 2020 17:41)  POCT Blood Glucose.: 123 mg/dL (22 Feb 2020 12:33)  POCT Blood Glucose.: 200 mg/dL (22 Feb 2020 08:13)            MEDICATIONS  (STANDING):  albuterol/ipratropium for Nebulization 3 milliLiter(s) Nebulizer every 6 hours  aspirin enteric coated 81 milliGRAM(s) Oral daily  atorvastatin 40 milliGRAM(s) Oral at bedtime  budesonide 160 MICROgram(s)/formoterol 4.5 MICROgram(s) Inhaler 2 Puff(s) Inhalation two times a day  dextrose 5%. 1000 milliLiter(s) (50 mL/Hr) IV Continuous <Continuous>  dextrose 50% Injectable 12.5 Gram(s) IV Push once  dextrose 50% Injectable 25 Gram(s) IV Push once  dextrose 50% Injectable 25 Gram(s) IV Push once  enoxaparin Injectable 40 milliGRAM(s) SubCutaneous daily  furosemide    Tablet 20 milliGRAM(s) Oral daily  insulin glargine Injectable (LANTUS) 75 Unit(s) SubCutaneous at bedtime  insulin lispro (HumaLOG) corrective regimen sliding scale   SubCutaneous three times a day before meals  insulin lispro (HumaLOG) corrective regimen sliding scale   SubCutaneous at bedtime  insulin lispro Injectable (HumaLOG) 30 Unit(s) SubCutaneous three times a day with meals  lisinopril 2.5 milliGRAM(s) Oral daily  melatonin 5 milliGRAM(s) Oral at bedtime  nicotine - Inhaler 1 Each Inhalation daily  nystatin Powder 1 Application(s) Topical two times a day  pantoprazole    Tablet 40 milliGRAM(s) Oral daily  polyethylene glycol 3350 17 Gram(s) Oral every 12 hours  risperiDONE   Tablet 0.5 milliGRAM(s) Oral three times a day  senna 2 Tablet(s) Oral at bedtime  sertraline 50 milliGRAM(s) Oral at bedtime    MEDICATIONS  (PRN):  acetaminophen   Tablet .. 650 milliGRAM(s) Oral every 6 hours PRN Mild Pain (1 - 3)  aluminum hydroxide/magnesium hydroxide/simethicone Suspension 30 milliLiter(s) Oral every 4 hours PRN Dyspepsia  bisacodyl 5 milliGRAM(s) Oral every 12 hours PRN Constipation  dextrose 40% Gel 15 Gram(s) Oral once PRN Blood Glucose LESS THAN 70 milliGRAM(s)/deciliter  glucagon  Injectable 1 milliGRAM(s) IntraMuscular once PRN Glucose LESS THAN 70 milligrams/deciliter  oxycodone    5 mG/acetaminophen 325 mG 2 Tablet(s) Oral every 6 hours PRN Moderate Pain (4 - 6)      Care Discussed with Consultants/Other Providers [ ] YES  [ ] NO

## 2020-02-22 NOTE — PROGRESS NOTE ADULT - SUBJECTIVE AND OBJECTIVE BOX
Chief complaint  Patient is a 59y old  Male who presents with a chief complaint of 59M w/ n/v/d and hyperglycemia sent in from [facility] (21 Feb 2020 23:28)   Review of systems  Patient in bed, looks comfortable, no fever,  no hypoglycemia.    Labs and Fingersticks  CAPILLARY BLOOD GLUCOSE      POCT Blood Glucose.: 123 mg/dL (22 Feb 2020 12:33)  POCT Blood Glucose.: 200 mg/dL (22 Feb 2020 08:13)  POCT Blood Glucose.: 232 mg/dL (21 Feb 2020 22:17)    Medications  MEDICATIONS  (STANDING):  albuterol/ipratropium for Nebulization 3 milliLiter(s) Nebulizer every 6 hours  aspirin enteric coated 81 milliGRAM(s) Oral daily  atorvastatin 40 milliGRAM(s) Oral at bedtime  budesonide 160 MICROgram(s)/formoterol 4.5 MICROgram(s) Inhaler 2 Puff(s) Inhalation two times a day  dextrose 5%. 1000 milliLiter(s) (50 mL/Hr) IV Continuous <Continuous>  dextrose 50% Injectable 12.5 Gram(s) IV Push once  dextrose 50% Injectable 25 Gram(s) IV Push once  dextrose 50% Injectable 25 Gram(s) IV Push once  enoxaparin Injectable 40 milliGRAM(s) SubCutaneous daily  furosemide    Tablet 20 milliGRAM(s) Oral daily  insulin glargine Injectable (LANTUS) 75 Unit(s) SubCutaneous at bedtime  insulin lispro (HumaLOG) corrective regimen sliding scale   SubCutaneous three times a day before meals  insulin lispro (HumaLOG) corrective regimen sliding scale   SubCutaneous at bedtime  insulin lispro Injectable (HumaLOG) 30 Unit(s) SubCutaneous three times a day with meals  lisinopril 2.5 milliGRAM(s) Oral daily  melatonin 5 milliGRAM(s) Oral at bedtime  nicotine - Inhaler 1 Each Inhalation daily  nystatin Powder 1 Application(s) Topical two times a day  pantoprazole    Tablet 40 milliGRAM(s) Oral daily  polyethylene glycol 3350 17 Gram(s) Oral every 12 hours  risperiDONE   Tablet 0.5 milliGRAM(s) Oral three times a day  senna 2 Tablet(s) Oral at bedtime  sertraline 50 milliGRAM(s) Oral at bedtime      Physical Exam  General: Patient comfortable in bed  Vital Signs Last 12 Hrs  T(F): 97.5 (02-22-20 @ 13:28), Max: 98 (02-22-20 @ 06:28)  HR: 84 (02-22-20 @ 13:28) (78 - 84)  BP: 101/72 (02-22-20 @ 13:28) (101/72 - 116/70)  BP(mean): --  RR: 18 (02-22-20 @ 13:28) (17 - 18)  SpO2: 96% (02-22-20 @ 13:28) (96% - 98%)  Neck: No palpable thyroid nodules.  CVS: S1S2, No murmurs  Respiratory: No wheezing, no crepitations  GI: Abdomen soft, bowel sounds positive  Musculoskeletal:  edema lower extremities.   Skin: No skin rashes, no ecchymosis    Diagnostics

## 2020-02-23 LAB
GLUCOSE BLDC GLUCOMTR-MCNC: 135 MG/DL — HIGH (ref 70–99)
GLUCOSE BLDC GLUCOMTR-MCNC: 152 MG/DL — HIGH (ref 70–99)
GLUCOSE BLDC GLUCOMTR-MCNC: 167 MG/DL — HIGH (ref 70–99)
GLUCOSE BLDC GLUCOMTR-MCNC: 293 MG/DL — HIGH (ref 70–99)

## 2020-02-23 RX ADMIN — ENOXAPARIN SODIUM 40 MILLIGRAM(S): 100 INJECTION SUBCUTANEOUS at 11:56

## 2020-02-23 RX ADMIN — RISPERIDONE 0.5 MILLIGRAM(S): 4 TABLET ORAL at 22:00

## 2020-02-23 RX ADMIN — PANTOPRAZOLE SODIUM 40 MILLIGRAM(S): 20 TABLET, DELAYED RELEASE ORAL at 06:27

## 2020-02-23 RX ADMIN — NYSTATIN CREAM 1 APPLICATION(S): 100000 CREAM TOPICAL at 22:02

## 2020-02-23 RX ADMIN — Medication 20 MILLIGRAM(S): at 06:27

## 2020-02-23 RX ADMIN — Medication 650 MILLIGRAM(S): at 22:57

## 2020-02-23 RX ADMIN — BUDESONIDE AND FORMOTEROL FUMARATE DIHYDRATE 2 PUFF(S): 160; 4.5 AEROSOL RESPIRATORY (INHALATION) at 17:34

## 2020-02-23 RX ADMIN — Medication 5 MILLIGRAM(S): at 21:58

## 2020-02-23 RX ADMIN — ATORVASTATIN CALCIUM 40 MILLIGRAM(S): 80 TABLET, FILM COATED ORAL at 21:58

## 2020-02-23 RX ADMIN — Medication 81 MILLIGRAM(S): at 11:56

## 2020-02-23 RX ADMIN — SERTRALINE 50 MILLIGRAM(S): 25 TABLET, FILM COATED ORAL at 22:01

## 2020-02-23 RX ADMIN — Medication 1: at 13:07

## 2020-02-23 RX ADMIN — Medication 3 MILLILITER(S): at 06:26

## 2020-02-23 RX ADMIN — Medication 650 MILLIGRAM(S): at 13:10

## 2020-02-23 RX ADMIN — Medication 3 MILLILITER(S): at 17:33

## 2020-02-23 RX ADMIN — Medication 3 MILLILITER(S): at 13:08

## 2020-02-23 RX ADMIN — OXYCODONE AND ACETAMINOPHEN 2 TABLET(S): 5; 325 TABLET ORAL at 18:20

## 2020-02-23 RX ADMIN — OXYCODONE AND ACETAMINOPHEN 2 TABLET(S): 5; 325 TABLET ORAL at 10:00

## 2020-02-23 RX ADMIN — INSULIN GLARGINE 75 UNIT(S): 100 INJECTION, SOLUTION SUBCUTANEOUS at 22:44

## 2020-02-23 RX ADMIN — Medication 30 UNIT(S): at 09:12

## 2020-02-23 RX ADMIN — RISPERIDONE 0.5 MILLIGRAM(S): 4 TABLET ORAL at 06:27

## 2020-02-23 RX ADMIN — Medication 650 MILLIGRAM(S): at 14:00

## 2020-02-23 RX ADMIN — Medication 650 MILLIGRAM(S): at 21:57

## 2020-02-23 RX ADMIN — Medication 30 UNIT(S): at 17:33

## 2020-02-23 RX ADMIN — OXYCODONE AND ACETAMINOPHEN 2 TABLET(S): 5; 325 TABLET ORAL at 17:37

## 2020-02-23 RX ADMIN — Medication 30 UNIT(S): at 13:07

## 2020-02-23 RX ADMIN — LISINOPRIL 2.5 MILLIGRAM(S): 2.5 TABLET ORAL at 06:27

## 2020-02-23 RX ADMIN — Medication 3: at 09:11

## 2020-02-23 RX ADMIN — OXYCODONE AND ACETAMINOPHEN 2 TABLET(S): 5; 325 TABLET ORAL at 09:16

## 2020-02-23 RX ADMIN — BUDESONIDE AND FORMOTEROL FUMARATE DIHYDRATE 2 PUFF(S): 160; 4.5 AEROSOL RESPIRATORY (INHALATION) at 06:27

## 2020-02-23 RX ADMIN — NYSTATIN CREAM 1 APPLICATION(S): 100000 CREAM TOPICAL at 06:22

## 2020-02-23 RX ADMIN — RISPERIDONE 0.5 MILLIGRAM(S): 4 TABLET ORAL at 14:11

## 2020-02-23 NOTE — PROGRESS NOTE ADULT - SUBJECTIVE AND OBJECTIVE BOX
Patient is a 59y old  Male who presents with a chief complaint of 59M w/ n/v/d and hyperglycemia sent in from [facility] (23 Feb 2020 18:06)      INTERVAL HPI/OVERNIGHT EVENTS:  T(C): 36.6 (02-23-20 @ 11:40), Max: 36.6 (02-22-20 @ 21:02)  HR: 98 (02-23-20 @ 11:40) (68 - 98)  BP: 130/74 (02-23-20 @ 11:40) (109/70 - 130/74)  RR: 17 (02-23-20 @ 06:25) (17 - 18)  SpO2: 100% (02-23-20 @ 11:40) (98% - 100%)  Wt(kg): --  I&O's Summary    22 Feb 2020 07:01  -  23 Feb 2020 07:00  --------------------------------------------------------  IN: 1240 mL / OUT: 3000 mL / NET: -1760 mL    23 Feb 2020 07:01  -  23 Feb 2020 19:50  --------------------------------------------------------  IN: 480 mL / OUT: 1700 mL / NET: -1220 mL        PAST MEDICAL & SURGICAL HISTORY:  Oxygen dependent  Gastroesophageal reflux disease, esophagitis presence not specified  Smoker  Bipolar 1 disorder  Coronary artery disease involving native coronary artery of native heart without angina pectoris  Type 2 diabetes mellitus without complication, with long-term current use of insulin  Pulmonary HTN  HLD (hyperlipidemia)  Stented coronary artery  COPD (chronic obstructive pulmonary disease)  No significant past surgical history      SOCIAL HISTORY  Alcohol:  Tobacco:  Illicit substance use:    FAMILY HISTORY:    REVIEW OF SYSTEMS:  CONSTITUTIONAL: No fever, weight loss, or fatigue  EYES: No eye pain, visual disturbances, or discharge  ENMT:  No difficulty hearing, tinnitus, vertigo; No sinus or throat pain  NECK: No pain or stiffness  RESPIRATORY: No cough, wheezing, chills or hemoptysis; No shortness of breath  CARDIOVASCULAR: No chest pain, palpitations, dizziness, or leg swelling  GASTROINTESTINAL: No abdominal or epigastric pain. No nausea, vomiting, or hematemesis; No diarrhea or constipation. No melena or hematochezia.  GENITOURINARY: No dysuria, frequency, hematuria, or incontinence  NEUROLOGICAL: No headaches, memory loss, loss of strength, numbness, or tremors  SKIN: No itching, burning, rashes, or lesions   LYMPH NODES: No enlarged glands  ENDOCRINE: No heat or cold intolerance; No hair loss  MUSCULOSKELETAL: No joint pain or swelling; No muscle, back, or extremity pain  PSYCHIATRIC: No depression, anxiety, mood swings, or difficulty sleeping  HEME/LYMPH: No easy bruising, or bleeding gums  ALLERY AND IMMUNOLOGIC: No hives or eczema    RADIOLOGY & ADDITIONAL TESTS:    Imaging Personally Reviewed:  [ ] YES  [ ] NO    Consultant(s) Notes Reviewed:  [ ] YES  [ ] NO    PHYSICAL EXAM:  GENERAL: NAD, well-groomed, well-developed  HEAD:  Atraumatic, Normocephalic  EYES: EOMI, PERRLA, conjunctiva and sclera clear  ENMT: No tonsillar erythema, exudates, or enlargement; Moist mucous membranes, Good dentition, No lesions  NECK: Supple, No JVD, Normal thyroid  NERVOUS SYSTEM:  Alert & Oriented X3, Good concentration; Motor Strength 5/5 B/L upper and lower extremities; DTRs 2+ intact and symmetric  CHEST/LUNG: Clear to percussion bilaterally; No rales, rhonchi, wheezing, or rubs  HEART: Regular rate and rhythm; No murmurs, rubs, or gallops  ABDOMEN: Soft, Nontender, Nondistended; Bowel sounds present  EXTREMITIES:  2+ Peripheral Pulses, No clubbing, cyanosis, or edema  LYMPH: No lymphadenopathy noted  SKIN: No rashes or lesions    LABS:              CAPILLARY BLOOD GLUCOSE      POCT Blood Glucose.: 135 mg/dL (23 Feb 2020 17:19)  POCT Blood Glucose.: 152 mg/dL (23 Feb 2020 12:15)  POCT Blood Glucose.: 293 mg/dL (23 Feb 2020 08:57)  POCT Blood Glucose.: 129 mg/dL (22 Feb 2020 22:23)            MEDICATIONS  (STANDING):  albuterol/ipratropium for Nebulization 3 milliLiter(s) Nebulizer every 6 hours  aspirin enteric coated 81 milliGRAM(s) Oral daily  atorvastatin 40 milliGRAM(s) Oral at bedtime  budesonide 160 MICROgram(s)/formoterol 4.5 MICROgram(s) Inhaler 2 Puff(s) Inhalation two times a day  dextrose 5%. 1000 milliLiter(s) (50 mL/Hr) IV Continuous <Continuous>  dextrose 50% Injectable 12.5 Gram(s) IV Push once  dextrose 50% Injectable 25 Gram(s) IV Push once  dextrose 50% Injectable 25 Gram(s) IV Push once  enoxaparin Injectable 40 milliGRAM(s) SubCutaneous daily  furosemide    Tablet 20 milliGRAM(s) Oral daily  insulin glargine Injectable (LANTUS) 75 Unit(s) SubCutaneous at bedtime  insulin lispro (HumaLOG) corrective regimen sliding scale   SubCutaneous three times a day before meals  insulin lispro (HumaLOG) corrective regimen sliding scale   SubCutaneous at bedtime  insulin lispro Injectable (HumaLOG) 30 Unit(s) SubCutaneous three times a day with meals  lisinopril 2.5 milliGRAM(s) Oral daily  melatonin 5 milliGRAM(s) Oral at bedtime  nicotine - Inhaler 1 Each Inhalation daily  nystatin Powder 1 Application(s) Topical two times a day  pantoprazole    Tablet 40 milliGRAM(s) Oral daily  polyethylene glycol 3350 17 Gram(s) Oral every 12 hours  risperiDONE   Tablet 0.5 milliGRAM(s) Oral three times a day  senna 2 Tablet(s) Oral at bedtime  sertraline 50 milliGRAM(s) Oral at bedtime    MEDICATIONS  (PRN):  acetaminophen   Tablet .. 650 milliGRAM(s) Oral every 6 hours PRN Mild Pain (1 - 3)  aluminum hydroxide/magnesium hydroxide/simethicone Suspension 30 milliLiter(s) Oral every 4 hours PRN Dyspepsia  bisacodyl 5 milliGRAM(s) Oral every 12 hours PRN Constipation  dextrose 40% Gel 15 Gram(s) Oral once PRN Blood Glucose LESS THAN 70 milliGRAM(s)/deciliter  glucagon  Injectable 1 milliGRAM(s) IntraMuscular once PRN Glucose LESS THAN 70 milligrams/deciliter  oxycodone    5 mG/acetaminophen 325 mG 2 Tablet(s) Oral every 6 hours PRN Moderate Pain (4 - 6)      Care Discussed with Consultants/Other Providers [ ] YES  [ ] NO

## 2020-02-23 NOTE — PROGRESS NOTE ADULT - SUBJECTIVE AND OBJECTIVE BOX
Chief complaint  Patient is a 59y old  Male who presents with a chief complaint of 59M w/ n/v/d and hyperglycemia sent in from [facility] (22 Feb 2020 22:43)   Review of systems  Patient in bed, looks comfortable, no fever, no hypoglycemia.    Labs and Fingersticks  CAPILLARY BLOOD GLUCOSE      POCT Blood Glucose.: 135 mg/dL (23 Feb 2020 17:19)  POCT Blood Glucose.: 152 mg/dL (23 Feb 2020 12:15)  POCT Blood Glucose.: 293 mg/dL (23 Feb 2020 08:57)  POCT Blood Glucose.: 129 mg/dL (22 Feb 2020 22:23)                        Medications  MEDICATIONS  (STANDING):  albuterol/ipratropium for Nebulization 3 milliLiter(s) Nebulizer every 6 hours  aspirin enteric coated 81 milliGRAM(s) Oral daily  atorvastatin 40 milliGRAM(s) Oral at bedtime  budesonide 160 MICROgram(s)/formoterol 4.5 MICROgram(s) Inhaler 2 Puff(s) Inhalation two times a day  dextrose 5%. 1000 milliLiter(s) (50 mL/Hr) IV Continuous <Continuous>  dextrose 50% Injectable 12.5 Gram(s) IV Push once  dextrose 50% Injectable 25 Gram(s) IV Push once  dextrose 50% Injectable 25 Gram(s) IV Push once  enoxaparin Injectable 40 milliGRAM(s) SubCutaneous daily  furosemide    Tablet 20 milliGRAM(s) Oral daily  insulin glargine Injectable (LANTUS) 75 Unit(s) SubCutaneous at bedtime  insulin lispro (HumaLOG) corrective regimen sliding scale   SubCutaneous three times a day before meals  insulin lispro (HumaLOG) corrective regimen sliding scale   SubCutaneous at bedtime  insulin lispro Injectable (HumaLOG) 30 Unit(s) SubCutaneous three times a day with meals  lisinopril 2.5 milliGRAM(s) Oral daily  melatonin 5 milliGRAM(s) Oral at bedtime  nicotine - Inhaler 1 Each Inhalation daily  nystatin Powder 1 Application(s) Topical two times a day  pantoprazole    Tablet 40 milliGRAM(s) Oral daily  polyethylene glycol 3350 17 Gram(s) Oral every 12 hours  risperiDONE   Tablet 0.5 milliGRAM(s) Oral three times a day  senna 2 Tablet(s) Oral at bedtime  sertraline 50 milliGRAM(s) Oral at bedtime      Physical Exam  General: Patient comfortable in bed  Vital Signs Last 12 Hrs  T(F): 97.9 (02-23-20 @ 11:40), Max: 97.9 (02-23-20 @ 11:40)  HR: 98 (02-23-20 @ 11:40) (70 - 98)  BP: 130/74 (02-23-20 @ 11:40) (109/70 - 130/74)  BP(mean): --  RR: 17 (02-23-20 @ 06:25) (17 - 17)  SpO2: 100% (02-23-20 @ 11:40) (98% - 100%)  Neck: No palpable thyroid nodules.  CVS: S1S2, No murmurs  Respiratory: No wheezing, no crepitations  GI: Abdomen soft, bowel sounds positive  Musculoskeletal:  edema lower extremities.   Skin: No skin rashes, no ecchymosis    Diagnostics

## 2020-02-23 NOTE — PROGRESS NOTE ADULT - ASSESSMENT
Assessment  DMT2: 59y Male with DM T2 with hyperglycemia, A1C 10.4%, on basal bolus insulin, FS in acceptable range, no hypoglycemic episodes.  Patient is eating full meals with good appetite.  Patient is awaiting placement to shelter. Social work FU noted.  CAD: on medications, no chest pain, stable, monitored.  HTN: Controlled,  on antihypertensive medications.  Overweight/Obesity: No strict exercise routines, not on any weight loss program,  neither on low  calorie diet.          Laury Romero MD  Cell: [phone]  Office: [phone]

## 2020-02-24 LAB
GLUCOSE BLDC GLUCOMTR-MCNC: 152 MG/DL — HIGH (ref 70–99)
GLUCOSE BLDC GLUCOMTR-MCNC: 198 MG/DL — HIGH (ref 70–99)
GLUCOSE BLDC GLUCOMTR-MCNC: 199 MG/DL — HIGH (ref 70–99)
GLUCOSE BLDC GLUCOMTR-MCNC: 234 MG/DL — HIGH (ref 70–99)

## 2020-02-24 RX ADMIN — Medication 3 MILLILITER(S): at 11:57

## 2020-02-24 RX ADMIN — Medication 30 UNIT(S): at 08:24

## 2020-02-24 RX ADMIN — OXYCODONE AND ACETAMINOPHEN 2 TABLET(S): 5; 325 TABLET ORAL at 12:23

## 2020-02-24 RX ADMIN — RISPERIDONE 0.5 MILLIGRAM(S): 4 TABLET ORAL at 06:25

## 2020-02-24 RX ADMIN — OXYCODONE AND ACETAMINOPHEN 2 TABLET(S): 5; 325 TABLET ORAL at 00:21

## 2020-02-24 RX ADMIN — Medication 5 MILLIGRAM(S): at 21:03

## 2020-02-24 RX ADMIN — OXYCODONE AND ACETAMINOPHEN 2 TABLET(S): 5; 325 TABLET ORAL at 07:21

## 2020-02-24 RX ADMIN — OXYCODONE AND ACETAMINOPHEN 2 TABLET(S): 5; 325 TABLET ORAL at 12:53

## 2020-02-24 RX ADMIN — ATORVASTATIN CALCIUM 40 MILLIGRAM(S): 80 TABLET, FILM COATED ORAL at 21:02

## 2020-02-24 RX ADMIN — Medication 3 MILLILITER(S): at 00:21

## 2020-02-24 RX ADMIN — RISPERIDONE 0.5 MILLIGRAM(S): 4 TABLET ORAL at 21:01

## 2020-02-24 RX ADMIN — ENOXAPARIN SODIUM 40 MILLIGRAM(S): 100 INJECTION SUBCUTANEOUS at 11:57

## 2020-02-24 RX ADMIN — INSULIN GLARGINE 75 UNIT(S): 100 INJECTION, SOLUTION SUBCUTANEOUS at 22:37

## 2020-02-24 RX ADMIN — PANTOPRAZOLE SODIUM 40 MILLIGRAM(S): 20 TABLET, DELAYED RELEASE ORAL at 06:21

## 2020-02-24 RX ADMIN — NYSTATIN CREAM 1 APPLICATION(S): 100000 CREAM TOPICAL at 21:04

## 2020-02-24 RX ADMIN — Medication 1: at 12:47

## 2020-02-24 RX ADMIN — Medication 3 MILLILITER(S): at 17:34

## 2020-02-24 RX ADMIN — LISINOPRIL 2.5 MILLIGRAM(S): 2.5 TABLET ORAL at 06:21

## 2020-02-24 RX ADMIN — OXYCODONE AND ACETAMINOPHEN 2 TABLET(S): 5; 325 TABLET ORAL at 19:18

## 2020-02-24 RX ADMIN — Medication 20 MILLIGRAM(S): at 06:21

## 2020-02-24 RX ADMIN — OXYCODONE AND ACETAMINOPHEN 2 TABLET(S): 5; 325 TABLET ORAL at 18:48

## 2020-02-24 RX ADMIN — Medication 650 MILLIGRAM(S): at 08:27

## 2020-02-24 RX ADMIN — RISPERIDONE 0.5 MILLIGRAM(S): 4 TABLET ORAL at 14:31

## 2020-02-24 RX ADMIN — NYSTATIN CREAM 1 APPLICATION(S): 100000 CREAM TOPICAL at 06:26

## 2020-02-24 RX ADMIN — Medication 1: at 08:24

## 2020-02-24 RX ADMIN — Medication 1: at 17:34

## 2020-02-24 RX ADMIN — Medication 30 UNIT(S): at 17:33

## 2020-02-24 RX ADMIN — BUDESONIDE AND FORMOTEROL FUMARATE DIHYDRATE 2 PUFF(S): 160; 4.5 AEROSOL RESPIRATORY (INHALATION) at 06:26

## 2020-02-24 RX ADMIN — OXYCODONE AND ACETAMINOPHEN 2 TABLET(S): 5; 325 TABLET ORAL at 06:21

## 2020-02-24 RX ADMIN — Medication 81 MILLIGRAM(S): at 11:57

## 2020-02-24 RX ADMIN — BUDESONIDE AND FORMOTEROL FUMARATE DIHYDRATE 2 PUFF(S): 160; 4.5 AEROSOL RESPIRATORY (INHALATION) at 17:34

## 2020-02-24 RX ADMIN — SERTRALINE 50 MILLIGRAM(S): 25 TABLET, FILM COATED ORAL at 21:02

## 2020-02-24 RX ADMIN — Medication 3 MILLILITER(S): at 06:27

## 2020-02-24 RX ADMIN — Medication 650 MILLIGRAM(S): at 08:57

## 2020-02-24 RX ADMIN — OXYCODONE AND ACETAMINOPHEN 2 TABLET(S): 5; 325 TABLET ORAL at 01:21

## 2020-02-24 RX ADMIN — Medication 30 UNIT(S): at 12:47

## 2020-02-24 NOTE — PROGRESS NOTE ADULT - SUBJECTIVE AND OBJECTIVE BOX
Chief complaint  Patient is a 59y old  Male who presents with a chief complaint of 59M w/ n/v/d and hyperglycemia sent in from [facility] (23 Feb 2020 19:50)   Review of systems  Patient in bed, looks comfortable, no hypoglycemia.    Labs and Fingersticks  CAPILLARY BLOOD GLUCOSE      POCT Blood Glucose.: 152 mg/dL (24 Feb 2020 12:44)  POCT Blood Glucose.: 199 mg/dL (24 Feb 2020 08:18)  POCT Blood Glucose.: 167 mg/dL (23 Feb 2020 22:13)  POCT Blood Glucose.: 135 mg/dL (23 Feb 2020 17:19)                        Medications  MEDICATIONS  (STANDING):  albuterol/ipratropium for Nebulization 3 milliLiter(s) Nebulizer every 6 hours  aspirin enteric coated 81 milliGRAM(s) Oral daily  atorvastatin 40 milliGRAM(s) Oral at bedtime  budesonide 160 MICROgram(s)/formoterol 4.5 MICROgram(s) Inhaler 2 Puff(s) Inhalation two times a day  dextrose 5%. 1000 milliLiter(s) (50 mL/Hr) IV Continuous <Continuous>  dextrose 50% Injectable 12.5 Gram(s) IV Push once  dextrose 50% Injectable 25 Gram(s) IV Push once  dextrose 50% Injectable 25 Gram(s) IV Push once  enoxaparin Injectable 40 milliGRAM(s) SubCutaneous daily  furosemide    Tablet 20 milliGRAM(s) Oral daily  insulin glargine Injectable (LANTUS) 75 Unit(s) SubCutaneous at bedtime  insulin lispro (HumaLOG) corrective regimen sliding scale   SubCutaneous three times a day before meals  insulin lispro (HumaLOG) corrective regimen sliding scale   SubCutaneous at bedtime  insulin lispro Injectable (HumaLOG) 30 Unit(s) SubCutaneous three times a day with meals  lisinopril 2.5 milliGRAM(s) Oral daily  melatonin 5 milliGRAM(s) Oral at bedtime  nicotine - Inhaler 1 Each Inhalation daily  nystatin Powder 1 Application(s) Topical two times a day  pantoprazole    Tablet 40 milliGRAM(s) Oral daily  polyethylene glycol 3350 17 Gram(s) Oral every 12 hours  risperiDONE   Tablet 0.5 milliGRAM(s) Oral three times a day  senna 2 Tablet(s) Oral at bedtime  sertraline 50 milliGRAM(s) Oral at bedtime      Physical Exam  General: Patient comfortable in bed  Vital Signs Last 12 Hrs  T(F): 97.7 (02-24-20 @ 12:15), Max: 97.7 (02-24-20 @ 06:15)  HR: 78 (02-24-20 @ 12:15) (78 - 78)  BP: 108/68 (02-24-20 @ 12:15) (108/68 - 110/68)  BP(mean): --  RR: 18 (02-24-20 @ 12:15) (18 - 18)  SpO2: 97% (02-24-20 @ 12:15) (97% - 98%)  Neck: No palpable thyroid nodules.  CVS: S1S2, No murmurs  Respiratory: No wheezing, no crepitations  GI: Abdomen soft, bowel sounds positive  Musculoskeletal:  edema lower extremities.   Skin: No skin rashes, no ecchymosis    Diagnostics Chief complaint  Patient is a 59y old  Male who presents with a chief  complaint of 59M w/ n/v/d and hyperglycemia sent in from [facility] (23 Feb 2020 19:50)   Review of systems  Patient in bed, looks comfortable, no hypoglycemia.    Labs and Fingersticks  CAPILLARY BLOOD GLUCOSE      POCT Blood Glucose.: 152 mg/dL (24 Feb 2020 12:44)  POCT Blood Glucose.: 199 mg/dL (24 Feb 2020 08:18)  POCT Blood Glucose.: 167 mg/dL (23 Feb 2020 22:13)  POCT Blood Glucose.: 135 mg/dL (23 Feb 2020 17:19)                        Medications  MEDICATIONS  (STANDING):  albuterol/ipratropium for Nebulization 3 milliLiter(s) Nebulizer every 6 hours  aspirin enteric coated 81 milliGRAM(s) Oral daily  atorvastatin 40 milliGRAM(s) Oral at bedtime  budesonide 160 MICROgram(s)/formoterol 4.5 MICROgram(s) Inhaler 2 Puff(s) Inhalation two times a day  dextrose 5%. 1000 milliLiter(s) (50 mL/Hr) IV Continuous <Continuous>  dextrose 50% Injectable 12.5 Gram(s) IV Push once  dextrose 50% Injectable 25 Gram(s) IV Push once  dextrose 50% Injectable 25 Gram(s) IV Push once  enoxaparin Injectable 40 milliGRAM(s) SubCutaneous daily  furosemide    Tablet 20 milliGRAM(s) Oral daily  insulin glargine Injectable (LANTUS) 75 Unit(s) SubCutaneous at bedtime  insulin lispro (HumaLOG) corrective regimen sliding scale   SubCutaneous three times a day before meals  insulin lispro (HumaLOG) corrective regimen sliding scale   SubCutaneous at bedtime  insulin lispro Injectable (HumaLOG) 30 Unit(s) SubCutaneous three times a day with meals  lisinopril 2.5 milliGRAM(s) Oral daily  melatonin 5 milliGRAM(s) Oral at bedtime  nicotine - Inhaler 1 Each Inhalation daily  nystatin Powder 1 Application(s) Topical two times a day  pantoprazole    Tablet 40 milliGRAM(s) Oral daily  polyethylene glycol 3350 17 Gram(s) Oral every 12 hours  risperiDONE   Tablet 0.5 milliGRAM(s) Oral three times a day  senna 2 Tablet(s) Oral at bedtime  sertraline 50 milliGRAM(s) Oral at bedtime      Physical Exam  General: Patient comfortable in bed  Vital Signs Last 12 Hrs  T(F): 97.7 (02-24-20 @ 12:15), Max: 97.7 (02-24-20 @ 06:15)  HR: 78 (02-24-20 @ 12:15) (78 - 78)  BP: 108/68 (02-24-20 @ 12:15) (108/68 - 110/68)  BP(mean): --  RR: 18 (02-24-20 @ 12:15) (18 - 18)  SpO2: 97% (02-24-20 @ 12:15) (97% - 98%)  Neck: No palpable thyroid nodules.  CVS: S1S2, No murmurs  Respiratory: No wheezing, no crepitations  GI: Abdomen soft, bowel sounds positive  Musculoskeletal:  edema lower extremities.   Skin: No skin rashes, no ecchymosis    Diagnostics

## 2020-02-24 NOTE — PROGRESS NOTE ADULT - ASSESSMENT
Assessment  DMT2: 59y Male with DM T2 with hyperglycemia, A1C 10.4%, on basal bolus insulin, FS improved and trending within acceptable range, no hypoglycemic episodes. Patient is eating full meals with good appetite. Social work FU 2/24 noted; Patient was denied shelter placement.  CAD: on medications, no chest pain, stable, monitored.  HTN: Controlled,  on antihypertensive medications.  Overweight/Obesity: No strict exercise routines, not on any weight loss program,  neither on low  calorie diet.          Laury Romero MD  Cell: [phone]  Office: [phone] Assessment  DMT2: 59y Male with DM T2 with hyperglycemia, A1C 10.4%, on basal bolus insulin, FS improved and trending within acceptable range, no hypoglycemic episodes. Patient is eating full meals with good appetite.  Social work FU 2/24 noted; Patient was denied shelter placement.  CAD: on medications, no chest pain, stable, monitored.  HTN: Controlled,  on antihypertensive medications.  Overweight/Obesity: No strict exercise routines, not on any weight loss program,  neither on low  calorie diet.          Laury Romero MD  Cell: [phone]  Office: [phone]

## 2020-02-24 NOTE — PROGRESS NOTE ADULT - SUBJECTIVE AND OBJECTIVE BOX
Patient is a 59y old  Male who presents with a chief complaint of 59M w/ n/v/d and hyperglycemia sent in from [facility] (24 Feb 2020 16:53)    pt. seen and examined, no c/o    INTERVAL HPI/OVERNIGHT EVENTS:  T(C): 36.4 (02-24-20 @ 20:09), Max: 36.5 (02-24-20 @ 06:15)  HR: 87 (02-24-20 @ 20:09) (78 - 87)  BP: 100/67 (02-24-20 @ 20:09) (100/67 - 110/68)  RR: 18 (02-24-20 @ 20:09) (18 - 18)  SpO2: 96% (02-24-20 @ 20:09) (96% - 98%)  Wt(kg): --  I&O's Summary    23 Feb 2020 07:01  -  24 Feb 2020 07:00  --------------------------------------------------------  IN: 480 mL / OUT: 2875 mL / NET: -2395 mL    24 Feb 2020 07:01  -  24 Feb 2020 22:23  --------------------------------------------------------  IN: 930 mL / OUT: 2750 mL / NET: -1820 mL        PAST MEDICAL & SURGICAL HISTORY:  Oxygen dependent  Gastroesophageal reflux disease, esophagitis presence not specified  Smoker  Bipolar 1 disorder  Coronary artery disease involving native coronary artery of native heart without angina pectoris  Type 2 diabetes mellitus without complication, with long-term current use of insulin  Pulmonary HTN  HLD (hyperlipidemia)  Stented coronary artery  COPD (chronic obstructive pulmonary disease)  No significant past surgical history      SOCIAL HISTORY  Alcohol:  Tobacco:  Illicit substance use:    FAMILY HISTORY:    REVIEW OF SYSTEMS:  CONSTITUTIONAL: No fever, weight loss, or fatigue  EYES: No eye pain, visual disturbances, or discharge  ENMT:  No difficulty hearing, tinnitus, vertigo; No sinus or throat pain  NECK: No pain or stiffness  RESPIRATORY: No cough, wheezing, chills or hemoptysis; No shortness of breath  CARDIOVASCULAR: No chest pain, palpitations, dizziness, or leg swelling  GASTROINTESTINAL: No abdominal or epigastric pain. No nausea, vomiting, or hematemesis; No diarrhea or constipation. No melena or hematochezia.  GENITOURINARY: No dysuria, frequency, hematuria, or incontinence  NEUROLOGICAL: No headaches, memory loss, loss of strength, numbness, or tremors  SKIN: No itching, burning, rashes, or lesions   LYMPH NODES: No enlarged glands  ENDOCRINE: No heat or cold intolerance; No hair loss  MUSCULOSKELETAL: No joint pain or swelling; No muscle, back, or extremity pain  PSYCHIATRIC: No depression, anxiety, mood swings, or difficulty sleeping  HEME/LYMPH: No easy bruising, or bleeding gums  ALLERY AND IMMUNOLOGIC: No hives or eczema    RADIOLOGY & ADDITIONAL TESTS:    Imaging Personally Reviewed:  [ ] YES  [ ] NO    Consultant(s) Notes Reviewed:  [ ] YES  [ ] NO    PHYSICAL EXAM:  GENERAL: NAD, well-groomed, well-developed  HEAD:  Atraumatic, Normocephalic  EYES: EOMI, PERRLA, conjunctiva and sclera clear  ENMT: No tonsillar erythema, exudates, or enlargement; Moist mucous membranes, Good dentition, No lesions  NECK: Supple, No JVD, Normal thyroid  NERVOUS SYSTEM:  Alert & Oriented X3, Good concentration; Motor Strength 5/5 B/L upper and lower extremities; DTRs 2+ intact and symmetric  CHEST/LUNG: Clear to percussion bilaterally; No rales, rhonchi, wheezing, or rubs  HEART: Regular rate and rhythm; No murmurs, rubs, or gallops  ABDOMEN: Soft, Nontender, Nondistended; Bowel sounds present  EXTREMITIES:  2+ Peripheral Pulses, No clubbing, cyanosis, or edema  LYMPH: No lymphadenopathy noted  SKIN: No rashes or lesions    LABS:              CAPILLARY BLOOD GLUCOSE      POCT Blood Glucose.: 234 mg/dL (24 Feb 2020 22:18)  POCT Blood Glucose.: 198 mg/dL (24 Feb 2020 17:01)  POCT Blood Glucose.: 152 mg/dL (24 Feb 2020 12:44)  POCT Blood Glucose.: 199 mg/dL (24 Feb 2020 08:18)            MEDICATIONS  (STANDING):  albuterol/ipratropium for Nebulization 3 milliLiter(s) Nebulizer every 6 hours  aspirin enteric coated 81 milliGRAM(s) Oral daily  atorvastatin 40 milliGRAM(s) Oral at bedtime  budesonide 160 MICROgram(s)/formoterol 4.5 MICROgram(s) Inhaler 2 Puff(s) Inhalation two times a day  dextrose 5%. 1000 milliLiter(s) (50 mL/Hr) IV Continuous <Continuous>  dextrose 50% Injectable 12.5 Gram(s) IV Push once  dextrose 50% Injectable 25 Gram(s) IV Push once  dextrose 50% Injectable 25 Gram(s) IV Push once  enoxaparin Injectable 40 milliGRAM(s) SubCutaneous daily  furosemide    Tablet 20 milliGRAM(s) Oral daily  insulin glargine Injectable (LANTUS) 75 Unit(s) SubCutaneous at bedtime  insulin lispro (HumaLOG) corrective regimen sliding scale   SubCutaneous three times a day before meals  insulin lispro (HumaLOG) corrective regimen sliding scale   SubCutaneous at bedtime  insulin lispro Injectable (HumaLOG) 30 Unit(s) SubCutaneous three times a day with meals  lisinopril 2.5 milliGRAM(s) Oral daily  melatonin 5 milliGRAM(s) Oral at bedtime  nicotine - Inhaler 1 Each Inhalation daily  nystatin Powder 1 Application(s) Topical two times a day  pantoprazole    Tablet 40 milliGRAM(s) Oral daily  polyethylene glycol 3350 17 Gram(s) Oral every 12 hours  risperiDONE   Tablet 0.5 milliGRAM(s) Oral three times a day  senna 2 Tablet(s) Oral at bedtime  sertraline 50 milliGRAM(s) Oral at bedtime    MEDICATIONS  (PRN):  acetaminophen   Tablet .. 650 milliGRAM(s) Oral every 6 hours PRN Mild Pain (1 - 3)  aluminum hydroxide/magnesium hydroxide/simethicone Suspension 30 milliLiter(s) Oral every 4 hours PRN Dyspepsia  bisacodyl 5 milliGRAM(s) Oral every 12 hours PRN Constipation  dextrose 40% Gel 15 Gram(s) Oral once PRN Blood Glucose LESS THAN 70 milliGRAM(s)/deciliter  glucagon  Injectable 1 milliGRAM(s) IntraMuscular once PRN Glucose LESS THAN 70 milligrams/deciliter  oxycodone    5 mG/acetaminophen 325 mG 2 Tablet(s) Oral every 6 hours PRN Moderate Pain (4 - 6)      Care Discussed with Consultants/Other Providers [ ] YES  [ ] NO

## 2020-02-25 LAB
GLUCOSE BLDC GLUCOMTR-MCNC: 136 MG/DL — HIGH (ref 70–99)
GLUCOSE BLDC GLUCOMTR-MCNC: 204 MG/DL — HIGH (ref 70–99)
GLUCOSE BLDC GLUCOMTR-MCNC: 207 MG/DL — HIGH (ref 70–99)
GLUCOSE BLDC GLUCOMTR-MCNC: 225 MG/DL — HIGH (ref 70–99)

## 2020-02-25 RX ADMIN — SENNA PLUS 2 TABLET(S): 8.6 TABLET ORAL at 21:29

## 2020-02-25 RX ADMIN — OXYCODONE AND ACETAMINOPHEN 2 TABLET(S): 5; 325 TABLET ORAL at 14:51

## 2020-02-25 RX ADMIN — SERTRALINE 50 MILLIGRAM(S): 25 TABLET, FILM COATED ORAL at 21:29

## 2020-02-25 RX ADMIN — RISPERIDONE 0.5 MILLIGRAM(S): 4 TABLET ORAL at 14:51

## 2020-02-25 RX ADMIN — Medication 650 MILLIGRAM(S): at 10:06

## 2020-02-25 RX ADMIN — INSULIN GLARGINE 75 UNIT(S): 100 INJECTION, SOLUTION SUBCUTANEOUS at 22:03

## 2020-02-25 RX ADMIN — Medication 81 MILLIGRAM(S): at 12:29

## 2020-02-25 RX ADMIN — Medication 650 MILLIGRAM(S): at 17:42

## 2020-02-25 RX ADMIN — RISPERIDONE 0.5 MILLIGRAM(S): 4 TABLET ORAL at 05:31

## 2020-02-25 RX ADMIN — OXYCODONE AND ACETAMINOPHEN 2 TABLET(S): 5; 325 TABLET ORAL at 00:48

## 2020-02-25 RX ADMIN — OXYCODONE AND ACETAMINOPHEN 2 TABLET(S): 5; 325 TABLET ORAL at 01:48

## 2020-02-25 RX ADMIN — Medication 3 MILLILITER(S): at 05:31

## 2020-02-25 RX ADMIN — Medication 650 MILLIGRAM(S): at 18:15

## 2020-02-25 RX ADMIN — Medication 3 MILLILITER(S): at 17:41

## 2020-02-25 RX ADMIN — Medication 2: at 08:36

## 2020-02-25 RX ADMIN — BUDESONIDE AND FORMOTEROL FUMARATE DIHYDRATE 2 PUFF(S): 160; 4.5 AEROSOL RESPIRATORY (INHALATION) at 17:40

## 2020-02-25 RX ADMIN — ATORVASTATIN CALCIUM 40 MILLIGRAM(S): 80 TABLET, FILM COATED ORAL at 21:27

## 2020-02-25 RX ADMIN — NYSTATIN CREAM 1 APPLICATION(S): 100000 CREAM TOPICAL at 05:32

## 2020-02-25 RX ADMIN — OXYCODONE AND ACETAMINOPHEN 2 TABLET(S): 5; 325 TABLET ORAL at 09:05

## 2020-02-25 RX ADMIN — Medication 20 MILLIGRAM(S): at 05:31

## 2020-02-25 RX ADMIN — Medication 5 MILLIGRAM(S): at 21:27

## 2020-02-25 RX ADMIN — Medication 30 UNIT(S): at 17:41

## 2020-02-25 RX ADMIN — NYSTATIN CREAM 1 APPLICATION(S): 100000 CREAM TOPICAL at 21:26

## 2020-02-25 RX ADMIN — RISPERIDONE 0.5 MILLIGRAM(S): 4 TABLET ORAL at 21:29

## 2020-02-25 RX ADMIN — PANTOPRAZOLE SODIUM 40 MILLIGRAM(S): 20 TABLET, DELAYED RELEASE ORAL at 05:31

## 2020-02-25 RX ADMIN — Medication 2: at 17:41

## 2020-02-25 RX ADMIN — BUDESONIDE AND FORMOTEROL FUMARATE DIHYDRATE 2 PUFF(S): 160; 4.5 AEROSOL RESPIRATORY (INHALATION) at 05:30

## 2020-02-25 RX ADMIN — OXYCODONE AND ACETAMINOPHEN 2 TABLET(S): 5; 325 TABLET ORAL at 22:25

## 2020-02-25 RX ADMIN — Medication 3 MILLILITER(S): at 23:04

## 2020-02-25 RX ADMIN — LISINOPRIL 2.5 MILLIGRAM(S): 2.5 TABLET ORAL at 05:31

## 2020-02-25 RX ADMIN — OXYCODONE AND ACETAMINOPHEN 2 TABLET(S): 5; 325 TABLET ORAL at 08:35

## 2020-02-25 RX ADMIN — OXYCODONE AND ACETAMINOPHEN 2 TABLET(S): 5; 325 TABLET ORAL at 21:40

## 2020-02-25 RX ADMIN — Medication 30 UNIT(S): at 08:36

## 2020-02-25 RX ADMIN — OXYCODONE AND ACETAMINOPHEN 2 TABLET(S): 5; 325 TABLET ORAL at 15:20

## 2020-02-25 RX ADMIN — Medication 650 MILLIGRAM(S): at 10:36

## 2020-02-25 RX ADMIN — Medication 30 UNIT(S): at 12:28

## 2020-02-25 RX ADMIN — ENOXAPARIN SODIUM 40 MILLIGRAM(S): 100 INJECTION SUBCUTANEOUS at 12:28

## 2020-02-25 RX ADMIN — Medication 3 MILLILITER(S): at 00:48

## 2020-02-25 RX ADMIN — Medication 3 MILLILITER(S): at 12:27

## 2020-02-25 NOTE — PROGRESS NOTE ADULT - ASSESSMENT
Assessment  DMT2: 59y Male with DM T2 with hyperglycemia, A1C 10.4%, on basal bolus insulin, FS improved and trending within overall acceptable range, no hypoglycemic episodes. Patient is eating full meals with good appetite, appears comfortable, no acute events. Patient was denied shelter placement; social work to FU.  CAD: on medications, no chest pain, stable, monitored.  HTN: Controlled,  on antihypertensive medications.  Overweight/Obesity: No strict exercise routines, not on any weight loss program,  neither on low  calorie diet.          Laury Romero MD  Cell: [phone]  Office: [phone] Assessment  DMT2: 59y Male with DM T2 with hyperglycemia, A1C 10.4%, on basal bolus insulin, FS improved and trending within overall acceptable range, no hypoglycemic episodes. Patient is eating full meals with good appetite,  appears comfortable, no acute events. Patient was denied shelter placement; social work to FU.  CAD: on medications, no chest pain, stable, monitored.  HTN: Controlled,  on antihypertensive medications.  Overweight/Obesity: No strict exercise routines, not on any weight loss program,  neither on low  calorie diet.          Laury Romero MD  Cell: [phone]  Office: [phone]

## 2020-02-25 NOTE — PROGRESS NOTE ADULT - SUBJECTIVE AND OBJECTIVE BOX
Chief complaint  Patient is a 59y old  Male who presents with a chief complaint of 59M w/ n/v/d and hyperglycemia sent in from [facility] (24 Feb 2020 22:22)   Review of systems  Patient in bed, looks comfortable, no hypoglycemia.    Labs and Fingersticks  CAPILLARY BLOOD GLUCOSE      POCT Blood Glucose.: 136 mg/dL (25 Feb 2020 12:12)  POCT Blood Glucose.: 204 mg/dL (25 Feb 2020 08:34)  POCT Blood Glucose.: 234 mg/dL (24 Feb 2020 22:18)  POCT Blood Glucose.: 198 mg/dL (24 Feb 2020 17:01)                        Medications  MEDICATIONS  (STANDING):  albuterol/ipratropium for Nebulization 3 milliLiter(s) Nebulizer every 6 hours  aspirin enteric coated 81 milliGRAM(s) Oral daily  atorvastatin 40 milliGRAM(s) Oral at bedtime  budesonide 160 MICROgram(s)/formoterol 4.5 MICROgram(s) Inhaler 2 Puff(s) Inhalation two times a day  dextrose 5%. 1000 milliLiter(s) (50 mL/Hr) IV Continuous <Continuous>  dextrose 50% Injectable 12.5 Gram(s) IV Push once  dextrose 50% Injectable 25 Gram(s) IV Push once  dextrose 50% Injectable 25 Gram(s) IV Push once  enoxaparin Injectable 40 milliGRAM(s) SubCutaneous daily  furosemide    Tablet 20 milliGRAM(s) Oral daily  insulin glargine Injectable (LANTUS) 75 Unit(s) SubCutaneous at bedtime  insulin lispro (HumaLOG) corrective regimen sliding scale   SubCutaneous three times a day before meals  insulin lispro (HumaLOG) corrective regimen sliding scale   SubCutaneous at bedtime  insulin lispro Injectable (HumaLOG) 30 Unit(s) SubCutaneous three times a day with meals  lisinopril 2.5 milliGRAM(s) Oral daily  melatonin 5 milliGRAM(s) Oral at bedtime  nicotine - Inhaler 1 Each Inhalation daily  nystatin Powder 1 Application(s) Topical two times a day  pantoprazole    Tablet 40 milliGRAM(s) Oral daily  polyethylene glycol 3350 17 Gram(s) Oral every 12 hours  risperiDONE   Tablet 0.5 milliGRAM(s) Oral three times a day  senna 2 Tablet(s) Oral at bedtime  sertraline 50 milliGRAM(s) Oral at bedtime      Physical Exam  General: Patient comfortable in bed  Vital Signs Last 12 Hrs  T(F): 97.7 (02-25-20 @ 11:47), Max: 97.9 (02-25-20 @ 05:28)  HR: 80 (02-25-20 @ 11:47) (74 - 80)  BP: 108/72 (02-25-20 @ 11:47) (108/72 - 115/75)  BP(mean): --  RR: 18 (02-25-20 @ 11:47) (18 - 18)  SpO2: 96% (02-25-20 @ 11:47) (96% - 96%)  Neck: No palpable thyroid nodules.  CVS: S1S2, No murmurs  Respiratory: No wheezing, no crepitations  GI: Abdomen soft, bowel sounds positive  Musculoskeletal:  edema lower extremities.   Skin: No skin rashes, no ecchymosis    Diagnostics Chief complaint  Patient is a 59y old  Male who presents with a chief complaint of 59M w/ n/v/d and hyperglycemia sent in from [facility] (24 Feb 2020 22:22)   Review of systems  Patient in bed, looks comfortable, no  hypoglycemia.    Labs and Fingersticks  CAPILLARY BLOOD GLUCOSE      POCT Blood Glucose.: 136 mg/dL (25 Feb 2020 12:12)  POCT Blood Glucose.: 204 mg/dL (25 Feb 2020 08:34)  POCT Blood Glucose.: 234 mg/dL (24 Feb 2020 22:18)  POCT Blood Glucose.: 198 mg/dL (24 Feb 2020 17:01)                        Medications  MEDICATIONS  (STANDING):  albuterol/ipratropium for Nebulization 3 milliLiter(s) Nebulizer every 6 hours  aspirin enteric coated 81 milliGRAM(s) Oral daily  atorvastatin 40 milliGRAM(s) Oral at bedtime  budesonide 160 MICROgram(s)/formoterol 4.5 MICROgram(s) Inhaler 2 Puff(s) Inhalation two times a day  dextrose 5%. 1000 milliLiter(s) (50 mL/Hr) IV Continuous <Continuous>  dextrose 50% Injectable 12.5 Gram(s) IV Push once  dextrose 50% Injectable 25 Gram(s) IV Push once  dextrose 50% Injectable 25 Gram(s) IV Push once  enoxaparin Injectable 40 milliGRAM(s) SubCutaneous daily  furosemide    Tablet 20 milliGRAM(s) Oral daily  insulin glargine Injectable (LANTUS) 75 Unit(s) SubCutaneous at bedtime  insulin lispro (HumaLOG) corrective regimen sliding scale   SubCutaneous three times a day before meals  insulin lispro (HumaLOG) corrective regimen sliding scale   SubCutaneous at bedtime  insulin lispro Injectable (HumaLOG) 30 Unit(s) SubCutaneous three times a day with meals  lisinopril 2.5 milliGRAM(s) Oral daily  melatonin 5 milliGRAM(s) Oral at bedtime  nicotine - Inhaler 1 Each Inhalation daily  nystatin Powder 1 Application(s) Topical two times a day  pantoprazole    Tablet 40 milliGRAM(s) Oral daily  polyethylene glycol 3350 17 Gram(s) Oral every 12 hours  risperiDONE   Tablet 0.5 milliGRAM(s) Oral three times a day  senna 2 Tablet(s) Oral at bedtime  sertraline 50 milliGRAM(s) Oral at bedtime      Physical Exam  General: Patient comfortable in bed  Vital Signs Last 12 Hrs  T(F): 97.7 (02-25-20 @ 11:47), Max: 97.9 (02-25-20 @ 05:28)  HR: 80 (02-25-20 @ 11:47) (74 - 80)  BP: 108/72 (02-25-20 @ 11:47) (108/72 - 115/75)  BP(mean): --  RR: 18 (02-25-20 @ 11:47) (18 - 18)  SpO2: 96% (02-25-20 @ 11:47) (96% - 96%)  Neck: No palpable thyroid nodules.  CVS: S1S2, No murmurs  Respiratory: No wheezing, no crepitations  GI: Abdomen soft, bowel sounds positive  Musculoskeletal:  edema lower extremities.   Skin: No skin rashes, no ecchymosis    Diagnostics

## 2020-02-25 NOTE — PROGRESS NOTE ADULT - SUBJECTIVE AND OBJECTIVE BOX
Patient is a 59y old  Male who presents with a chief complaint of 59M w/ n/v/d and hyperglycemia sent in from [facility] (25 Feb 2020 15:04)    pt. sen and examined , no c/o    INTERVAL HPI/OVERNIGHT EVENTS:  T(C): 36.6 (02-25-20 @ 20:01), Max: 36.6 (02-25-20 @ 05:28)  HR: 97 (02-25-20 @ 20:01) (74 - 97)  BP: 101/66 (02-25-20 @ 20:01) (101/66 - 115/75)  RR: 18 (02-25-20 @ 20:01) (18 - 18)  SpO2: 95% (02-25-20 @ 20:01) (95% - 96%)  Wt(kg): --  I&O's Summary    24 Feb 2020 07:01  -  25 Feb 2020 07:00  --------------------------------------------------------  IN: 930 mL / OUT: 3350 mL / NET: -2420 mL    25 Feb 2020 07:01  -  26 Feb 2020 02:02  --------------------------------------------------------  IN: 1190 mL / OUT: 1025 mL / NET: 165 mL        PAST MEDICAL & SURGICAL HISTORY:  Oxygen dependent  Gastroesophageal reflux disease, esophagitis presence not specified  Smoker  Bipolar 1 disorder  Coronary artery disease involving native coronary artery of native heart without angina pectoris  Type 2 diabetes mellitus without complication, with long-term current use of insulin  Pulmonary HTN  HLD (hyperlipidemia)  Stented coronary artery  COPD (chronic obstructive pulmonary disease)  No significant past surgical history      SOCIAL HISTORY  Alcohol:  Tobacco:  Illicit substance use:    FAMILY HISTORY:    REVIEW OF SYSTEMS:  CONSTITUTIONAL: No fever, weight loss, or fatigue  EYES: No eye pain, visual disturbances, or discharge  ENMT:  No difficulty hearing, tinnitus, vertigo; No sinus or throat pain  NECK: No pain or stiffness  RESPIRATORY: No cough, wheezing, chills or hemoptysis; No shortness of breath  CARDIOVASCULAR: No chest pain, palpitations, dizziness, or leg swelling  GASTROINTESTINAL: No abdominal or epigastric pain. No nausea, vomiting, or hematemesis; No diarrhea or constipation. No melena or hematochezia.  GENITOURINARY: No dysuria, frequency, hematuria, or incontinence  NEUROLOGICAL: No headaches, memory loss, loss of strength, numbness, or tremors  SKIN: No itching, burning, rashes, or lesions   LYMPH NODES: No enlarged glands  ENDOCRINE: No heat or cold intolerance; No hair loss  MUSCULOSKELETAL: No joint pain or swelling; No muscle, back, or extremity pain  PSYCHIATRIC: No depression, anxiety, mood swings, or difficulty sleeping  HEME/LYMPH: No easy bruising, or bleeding gums  ALLERY AND IMMUNOLOGIC: No hives or eczema    RADIOLOGY & ADDITIONAL TESTS:    Imaging Personally Reviewed:  [ ] YES  [ ] NO    Consultant(s) Notes Reviewed:  [ ] YES  [ ] NO    PHYSICAL EXAM:  GENERAL: NAD, well-groomed, well-developed  HEAD:  Atraumatic, Normocephalic  EYES: EOMI, PERRLA, conjunctiva and sclera clear  ENMT: No tonsillar erythema, exudates, or enlargement; Moist mucous membranes, Good dentition, No lesions  NECK: Supple, No JVD, Normal thyroid  NERVOUS SYSTEM:  Alert & Oriented X3, Good concentration; Motor Strength 5/5 B/L upper and lower extremities; DTRs 2+ intact and symmetric  CHEST/LUNG: Clear to percussion bilaterally; No rales, rhonchi, wheezing, or rubs  HEART: Regular rate and rhythm; No murmurs, rubs, or gallops  ABDOMEN: Soft, Nontender, Nondistended; Bowel sounds present  EXTREMITIES:  2+ Peripheral Pulses, No clubbing, cyanosis, or edema  LYMPH: No lymphadenopathy noted  SKIN: No rashes or lesions    LABS:              CAPILLARY BLOOD GLUCOSE      POCT Blood Glucose.: 207 mg/dL (25 Feb 2020 21:56)  POCT Blood Glucose.: 225 mg/dL (25 Feb 2020 17:07)  POCT Blood Glucose.: 136 mg/dL (25 Feb 2020 12:12)  POCT Blood Glucose.: 204 mg/dL (25 Feb 2020 08:34)            MEDICATIONS  (STANDING):  albuterol/ipratropium for Nebulization 3 milliLiter(s) Nebulizer every 6 hours  aspirin enteric coated 81 milliGRAM(s) Oral daily  atorvastatin 40 milliGRAM(s) Oral at bedtime  budesonide 160 MICROgram(s)/formoterol 4.5 MICROgram(s) Inhaler 2 Puff(s) Inhalation two times a day  dextrose 5%. 1000 milliLiter(s) (50 mL/Hr) IV Continuous <Continuous>  dextrose 50% Injectable 12.5 Gram(s) IV Push once  dextrose 50% Injectable 25 Gram(s) IV Push once  dextrose 50% Injectable 25 Gram(s) IV Push once  enoxaparin Injectable 40 milliGRAM(s) SubCutaneous daily  furosemide    Tablet 20 milliGRAM(s) Oral daily  insulin glargine Injectable (LANTUS) 75 Unit(s) SubCutaneous at bedtime  insulin lispro (HumaLOG) corrective regimen sliding scale   SubCutaneous three times a day before meals  insulin lispro (HumaLOG) corrective regimen sliding scale   SubCutaneous at bedtime  insulin lispro Injectable (HumaLOG) 30 Unit(s) SubCutaneous three times a day with meals  lisinopril 2.5 milliGRAM(s) Oral daily  melatonin 5 milliGRAM(s) Oral at bedtime  nicotine - Inhaler 1 Each Inhalation daily  nystatin Powder 1 Application(s) Topical two times a day  pantoprazole    Tablet 40 milliGRAM(s) Oral daily  polyethylene glycol 3350 17 Gram(s) Oral every 12 hours  risperiDONE   Tablet 0.5 milliGRAM(s) Oral three times a day  senna 2 Tablet(s) Oral at bedtime  sertraline 50 milliGRAM(s) Oral at bedtime    MEDICATIONS  (PRN):  acetaminophen   Tablet .. 650 milliGRAM(s) Oral every 6 hours PRN Mild Pain (1 - 3)  aluminum hydroxide/magnesium hydroxide/simethicone Suspension 30 milliLiter(s) Oral every 4 hours PRN Dyspepsia  bisacodyl 5 milliGRAM(s) Oral every 12 hours PRN Constipation  dextrose 40% Gel 15 Gram(s) Oral once PRN Blood Glucose LESS THAN 70 milliGRAM(s)/deciliter  glucagon  Injectable 1 milliGRAM(s) IntraMuscular once PRN Glucose LESS THAN 70 milligrams/deciliter  oxycodone    5 mG/acetaminophen 325 mG 2 Tablet(s) Oral every 6 hours PRN Moderate Pain (4 - 6)      Care Discussed with Consultants/Other Providers [ ] YES  [ ] NO

## 2020-02-26 LAB
GLUCOSE BLDC GLUCOMTR-MCNC: 139 MG/DL — HIGH (ref 70–99)
GLUCOSE BLDC GLUCOMTR-MCNC: 161 MG/DL — HIGH (ref 70–99)
GLUCOSE BLDC GLUCOMTR-MCNC: 202 MG/DL — HIGH (ref 70–99)
GLUCOSE BLDC GLUCOMTR-MCNC: 211 MG/DL — HIGH (ref 70–99)

## 2020-02-26 RX ORDER — OXYCODONE AND ACETAMINOPHEN 5; 325 MG/1; MG/1
2 TABLET ORAL EVERY 6 HOURS
Refills: 0 | Status: DISCONTINUED | OUTPATIENT
Start: 2020-02-26 | End: 2020-03-04

## 2020-02-26 RX ADMIN — PANTOPRAZOLE SODIUM 40 MILLIGRAM(S): 20 TABLET, DELAYED RELEASE ORAL at 05:46

## 2020-02-26 RX ADMIN — Medication 30 UNIT(S): at 17:48

## 2020-02-26 RX ADMIN — Medication 2: at 08:34

## 2020-02-26 RX ADMIN — Medication 3 MILLILITER(S): at 23:07

## 2020-02-26 RX ADMIN — Medication 650 MILLIGRAM(S): at 13:30

## 2020-02-26 RX ADMIN — BUDESONIDE AND FORMOTEROL FUMARATE DIHYDRATE 2 PUFF(S): 160; 4.5 AEROSOL RESPIRATORY (INHALATION) at 17:59

## 2020-02-26 RX ADMIN — OXYCODONE AND ACETAMINOPHEN 2 TABLET(S): 5; 325 TABLET ORAL at 23:30

## 2020-02-26 RX ADMIN — RISPERIDONE 0.5 MILLIGRAM(S): 4 TABLET ORAL at 05:46

## 2020-02-26 RX ADMIN — Medication 1: at 17:48

## 2020-02-26 RX ADMIN — BUDESONIDE AND FORMOTEROL FUMARATE DIHYDRATE 2 PUFF(S): 160; 4.5 AEROSOL RESPIRATORY (INHALATION) at 05:46

## 2020-02-26 RX ADMIN — Medication 5 MILLIGRAM(S): at 21:31

## 2020-02-26 RX ADMIN — RISPERIDONE 0.5 MILLIGRAM(S): 4 TABLET ORAL at 14:51

## 2020-02-26 RX ADMIN — Medication 20 MILLIGRAM(S): at 05:46

## 2020-02-26 RX ADMIN — Medication 3 MILLILITER(S): at 12:29

## 2020-02-26 RX ADMIN — Medication 30 UNIT(S): at 08:34

## 2020-02-26 RX ADMIN — NYSTATIN CREAM 1 APPLICATION(S): 100000 CREAM TOPICAL at 05:42

## 2020-02-26 RX ADMIN — LISINOPRIL 2.5 MILLIGRAM(S): 2.5 TABLET ORAL at 05:46

## 2020-02-26 RX ADMIN — OXYCODONE AND ACETAMINOPHEN 2 TABLET(S): 5; 325 TABLET ORAL at 14:51

## 2020-02-26 RX ADMIN — INSULIN GLARGINE 75 UNIT(S): 100 INJECTION, SOLUTION SUBCUTANEOUS at 22:30

## 2020-02-26 RX ADMIN — OXYCODONE AND ACETAMINOPHEN 2 TABLET(S): 5; 325 TABLET ORAL at 15:21

## 2020-02-26 RX ADMIN — Medication 30 UNIT(S): at 12:30

## 2020-02-26 RX ADMIN — SERTRALINE 50 MILLIGRAM(S): 25 TABLET, FILM COATED ORAL at 21:31

## 2020-02-26 RX ADMIN — ATORVASTATIN CALCIUM 40 MILLIGRAM(S): 80 TABLET, FILM COATED ORAL at 21:30

## 2020-02-26 RX ADMIN — Medication 650 MILLIGRAM(S): at 19:30

## 2020-02-26 RX ADMIN — Medication 3 MILLILITER(S): at 05:46

## 2020-02-26 RX ADMIN — Medication 3 MILLILITER(S): at 17:59

## 2020-02-26 RX ADMIN — OXYCODONE AND ACETAMINOPHEN 2 TABLET(S): 5; 325 TABLET ORAL at 08:27

## 2020-02-26 RX ADMIN — NYSTATIN CREAM 1 APPLICATION(S): 100000 CREAM TOPICAL at 21:32

## 2020-02-26 RX ADMIN — ENOXAPARIN SODIUM 40 MILLIGRAM(S): 100 INJECTION SUBCUTANEOUS at 12:30

## 2020-02-26 RX ADMIN — Medication 650 MILLIGRAM(S): at 20:30

## 2020-02-26 RX ADMIN — RISPERIDONE 0.5 MILLIGRAM(S): 4 TABLET ORAL at 21:31

## 2020-02-26 RX ADMIN — OXYCODONE AND ACETAMINOPHEN 2 TABLET(S): 5; 325 TABLET ORAL at 22:34

## 2020-02-26 RX ADMIN — Medication 81 MILLIGRAM(S): at 12:30

## 2020-02-26 RX ADMIN — OXYCODONE AND ACETAMINOPHEN 2 TABLET(S): 5; 325 TABLET ORAL at 08:57

## 2020-02-26 RX ADMIN — Medication 650 MILLIGRAM(S): at 12:32

## 2020-02-26 NOTE — PROGRESS NOTE ADULT - SUBJECTIVE AND OBJECTIVE BOX
Patient is a 59y old  Male who presents with a chief complaint of 59M w/ n/v/d and hyperglycemia sent in from [facility] (26 Feb 2020 14:32)    pt. seen and examined , no c/o    INTERVAL HPI/OVERNIGHT EVENTS:  T(C): 36.3 (02-26-20 @ 12:23), Max: 36.6 (02-25-20 @ 20:01)  HR: 80 (02-26-20 @ 12:23) (69 - 97)  BP: 107/72 (02-26-20 @ 12:23) (101/66 - 107/72)  RR: 18 (02-26-20 @ 12:23) (16 - 18)  SpO2: 96% (02-26-20 @ 12:23) (95% - 97%)  Wt(kg): --  I&O's Summary    25 Feb 2020 07:01  -  26 Feb 2020 07:00  --------------------------------------------------------  IN: 1310 mL / OUT: 1325 mL / NET: -15 mL    26 Feb 2020 07:01  -  26 Feb 2020 18:04  --------------------------------------------------------  IN: 880 mL / OUT: 850 mL / NET: 30 mL        PAST MEDICAL & SURGICAL HISTORY:  Oxygen dependent  Gastroesophageal reflux disease, esophagitis presence not specified  Smoker  Bipolar 1 disorder  Coronary artery disease involving native coronary artery of native heart without angina pectoris  Type 2 diabetes mellitus without complication, with long-term current use of insulin  Pulmonary HTN  HLD (hyperlipidemia)  Stented coronary artery  COPD (chronic obstructive pulmonary disease)  No significant past surgical history      SOCIAL HISTORY  Alcohol:  Tobacco:  Illicit substance use:    FAMILY HISTORY:    REVIEW OF SYSTEMS:  CONSTITUTIONAL: No fever, weight loss, or fatigue  EYES: No eye pain, visual disturbances, or discharge  ENMT:  No difficulty hearing, tinnitus, vertigo; No sinus or throat pain  NECK: No pain or stiffness  RESPIRATORY: No cough, wheezing, chills or hemoptysis; No shortness of breath  CARDIOVASCULAR: No chest pain, palpitations, dizziness, or leg swelling  GASTROINTESTINAL: No abdominal or epigastric pain. No nausea, vomiting, or hematemesis; No diarrhea or constipation. No melena or hematochezia.  GENITOURINARY: No dysuria, frequency, hematuria, or incontinence  NEUROLOGICAL: No headaches, memory loss, loss of strength, numbness, or tremors  SKIN: No itching, burning, rashes, or lesions   LYMPH NODES: No enlarged glands  ENDOCRINE: No heat or cold intolerance; No hair loss  MUSCULOSKELETAL: No joint pain or swelling; No muscle, back, or extremity pain  PSYCHIATRIC: No depression, anxiety, mood swings, or difficulty sleeping  HEME/LYMPH: No easy bruising, or bleeding gums  ALLERY AND IMMUNOLOGIC: No hives or eczema    RADIOLOGY & ADDITIONAL TESTS:    Imaging Personally Reviewed:  [ ] YES  [ ] NO    Consultant(s) Notes Reviewed:  [ ] YES  [ ] NO    PHYSICAL EXAM:  GENERAL: NAD, well-groomed, well-developed  HEAD:  Atraumatic, Normocephalic  EYES: EOMI, PERRLA, conjunctiva and sclera clear  ENMT: No tonsillar erythema, exudates, or enlargement; Moist mucous membranes, Good dentition, No lesions  NECK: Supple, No JVD, Normal thyroid  NERVOUS SYSTEM:  Alert & Oriented X3, Good concentration; Motor Strength 5/5 B/L upper and lower extremities; DTRs 2+ intact and symmetric  CHEST/LUNG: Clear to percussion bilaterally; No rales, rhonchi, wheezing, or rubs  HEART: Regular rate and rhythm; No murmurs, rubs, or gallops  ABDOMEN: Soft, Nontender, Nondistended; Bowel sounds present  EXTREMITIES:  2+ Peripheral Pulses, No clubbing, cyanosis, or edema  LYMPH: No lymphadenopathy noted  SKIN: No rashes or lesions    LABS:              CAPILLARY BLOOD GLUCOSE      POCT Blood Glucose.: 161 mg/dL (26 Feb 2020 17:16)  POCT Blood Glucose.: 139 mg/dL (26 Feb 2020 12:27)  POCT Blood Glucose.: 202 mg/dL (26 Feb 2020 08:29)  POCT Blood Glucose.: 207 mg/dL (25 Feb 2020 21:56)            MEDICATIONS  (STANDING):  albuterol/ipratropium for Nebulization 3 milliLiter(s) Nebulizer every 6 hours  aspirin enteric coated 81 milliGRAM(s) Oral daily  atorvastatin 40 milliGRAM(s) Oral at bedtime  budesonide 160 MICROgram(s)/formoterol 4.5 MICROgram(s) Inhaler 2 Puff(s) Inhalation two times a day  dextrose 5%. 1000 milliLiter(s) (50 mL/Hr) IV Continuous <Continuous>  dextrose 50% Injectable 12.5 Gram(s) IV Push once  dextrose 50% Injectable 25 Gram(s) IV Push once  dextrose 50% Injectable 25 Gram(s) IV Push once  enoxaparin Injectable 40 milliGRAM(s) SubCutaneous daily  furosemide    Tablet 20 milliGRAM(s) Oral daily  insulin glargine Injectable (LANTUS) 75 Unit(s) SubCutaneous at bedtime  insulin lispro (HumaLOG) corrective regimen sliding scale   SubCutaneous three times a day before meals  insulin lispro (HumaLOG) corrective regimen sliding scale   SubCutaneous at bedtime  insulin lispro Injectable (HumaLOG) 30 Unit(s) SubCutaneous three times a day with meals  lisinopril 2.5 milliGRAM(s) Oral daily  melatonin 5 milliGRAM(s) Oral at bedtime  nicotine - Inhaler 1 Each Inhalation daily  nystatin Powder 1 Application(s) Topical two times a day  pantoprazole    Tablet 40 milliGRAM(s) Oral daily  polyethylene glycol 3350 17 Gram(s) Oral every 12 hours  risperiDONE   Tablet 0.5 milliGRAM(s) Oral three times a day  senna 2 Tablet(s) Oral at bedtime  sertraline 50 milliGRAM(s) Oral at bedtime    MEDICATIONS  (PRN):  acetaminophen   Tablet .. 650 milliGRAM(s) Oral every 6 hours PRN Mild Pain (1 - 3)  aluminum hydroxide/magnesium hydroxide/simethicone Suspension 30 milliLiter(s) Oral every 4 hours PRN Dyspepsia  bisacodyl 5 milliGRAM(s) Oral every 12 hours PRN Constipation  dextrose 40% Gel 15 Gram(s) Oral once PRN Blood Glucose LESS THAN 70 milliGRAM(s)/deciliter  glucagon  Injectable 1 milliGRAM(s) IntraMuscular once PRN Glucose LESS THAN 70 milligrams/deciliter  oxycodone    5 mG/acetaminophen 325 mG 2 Tablet(s) Oral every 6 hours PRN Moderate Pain (4 - 6)      Care Discussed with Consultants/Other Providers [ ] YES  [ ] NO

## 2020-02-26 NOTE — PROVIDER CONTACT NOTE (OTHER) - SITUATION
Pt pending placement at assisted living has no iv access at this time. As per night shift DICK Ojeda pt has not had iv for several days. Pt is stable at this time.

## 2020-02-26 NOTE — PROGRESS NOTE ADULT - PROBLEM SELECTOR PLAN 1
Will continue current insulin regimen for now. Will continue monitoring FS, log, and FU.  Suggest to DC on current insulin regimen and FU endo 4 weeks.  Discussed plan with patient. Counseled for compliance with consistent low carb diet and exercise as tolerated outpatient. Will continue current insulin regimen for now. Will continue monitoring FS, log, and FU.  Suggest to DC on current insulin regimen and  FU endo 4 weeks.  Discussed plan with patient. Counseled for compliance with consistent low carb diet and exercise as tolerated outpatient.

## 2020-02-26 NOTE — PROGRESS NOTE ADULT - ASSESSMENT
Assessment  DMT2: 59y Male with DM T2 with hyperglycemia, A1C 10.4%, on basal bolus insulin, FS fluctuating though in overall acceptable range, no hypoglycemic episodes. Patient is eating full meals with good appetite, alert and comfortable, no acute events, still awaiting placement.  CAD: on medications, no chest pain, stable, monitored.  HTN: Controlled,  on antihypertensive medications.  Overweight/Obesity: No strict exercise routines, not on any weight loss program,  neither on low  calorie diet.          Laury Romero MD  Cell: [phone]  Office: [phone] Assessment  DMT2: 59y Male with DM T2 with hyperglycemia, A1C 10.4%, on basal bolus insulin, FS fluctuating though in overall acceptable range, no hypoglycemic episodes. Patient is eating full meals with good appetite, alert and comfortable,  no acute events, still awaiting placement.  CAD: on medications, no chest pain, stable, monitored.  HTN: Controlled,  on antihypertensive medications.  Overweight/Obesity: No strict exercise routines, not on any weight loss program,  neither on low  calorie diet.          Laury Romero MD  Cell: [phone]  Office: [phone]

## 2020-02-26 NOTE — PROGRESS NOTE ADULT - SUBJECTIVE AND OBJECTIVE BOX
Chief complaint  Patient is a 59y old  Male who presents with a chief complaint of 59M w/ n/v/d and hyperglycemia sent in from [facility] (25 Feb 2020 20:02)   Review of systems  Patient in bed, looks comfortable, no hypoglycemia.    Labs and Fingersticks  CAPILLARY BLOOD GLUCOSE      POCT Blood Glucose.: 139 mg/dL (26 Feb 2020 12:27)  POCT Blood Glucose.: 202 mg/dL (26 Feb 2020 08:29)  POCT Blood Glucose.: 207 mg/dL (25 Feb 2020 21:56)  POCT Blood Glucose.: 225 mg/dL (25 Feb 2020 17:07)                        Medications  MEDICATIONS  (STANDING):  albuterol/ipratropium for Nebulization 3 milliLiter(s) Nebulizer every 6 hours  aspirin enteric coated 81 milliGRAM(s) Oral daily  atorvastatin 40 milliGRAM(s) Oral at bedtime  budesonide 160 MICROgram(s)/formoterol 4.5 MICROgram(s) Inhaler 2 Puff(s) Inhalation two times a day  dextrose 5%. 1000 milliLiter(s) (50 mL/Hr) IV Continuous <Continuous>  dextrose 50% Injectable 12.5 Gram(s) IV Push once  dextrose 50% Injectable 25 Gram(s) IV Push once  dextrose 50% Injectable 25 Gram(s) IV Push once  enoxaparin Injectable 40 milliGRAM(s) SubCutaneous daily  furosemide    Tablet 20 milliGRAM(s) Oral daily  insulin glargine Injectable (LANTUS) 75 Unit(s) SubCutaneous at bedtime  insulin lispro (HumaLOG) corrective regimen sliding scale   SubCutaneous three times a day before meals  insulin lispro (HumaLOG) corrective regimen sliding scale   SubCutaneous at bedtime  insulin lispro Injectable (HumaLOG) 30 Unit(s) SubCutaneous three times a day with meals  lisinopril 2.5 milliGRAM(s) Oral daily  melatonin 5 milliGRAM(s) Oral at bedtime  nicotine - Inhaler 1 Each Inhalation daily  nystatin Powder 1 Application(s) Topical two times a day  pantoprazole    Tablet 40 milliGRAM(s) Oral daily  polyethylene glycol 3350 17 Gram(s) Oral every 12 hours  risperiDONE   Tablet 0.5 milliGRAM(s) Oral three times a day  senna 2 Tablet(s) Oral at bedtime  sertraline 50 milliGRAM(s) Oral at bedtime      Physical Exam  General: Patient comfortable in bed  Vital Signs Last 12 Hrs  T(F): 97.3 (02-26-20 @ 12:23), Max: 97.3 (02-26-20 @ 04:34)  HR: 80 (02-26-20 @ 12:23) (69 - 80)  BP: 107/72 (02-26-20 @ 12:23) (106/70 - 107/72)  BP(mean): --  RR: 18 (02-26-20 @ 12:23) (16 - 18)  SpO2: 96% (02-26-20 @ 12:23) (96% - 97%)  Neck: No palpable thyroid nodules.  CVS: S1S2, No murmurs  Respiratory: No wheezing, no crepitations  GI: Abdomen soft, bowel sounds positive  Musculoskeletal:  edema lower extremities.   Skin: No skin rashes, no ecchymosis    Diagnostics Chief complaint  Patient is a 59y old  Male who presents with  a chief complaint of 59M w/ n/v/d and hyperglycemia sent in from [facility] (25 Feb 2020 20:02)   Review of systems  Patient in bed, looks comfortable, no hypoglycemia.    Labs and Fingersticks  CAPILLARY BLOOD GLUCOSE      POCT Blood Glucose.: 139 mg/dL (26 Feb 2020 12:27)  POCT Blood Glucose.: 202 mg/dL (26 Feb 2020 08:29)  POCT Blood Glucose.: 207 mg/dL (25 Feb 2020 21:56)  POCT Blood Glucose.: 225 mg/dL (25 Feb 2020 17:07)                        Medications  MEDICATIONS  (STANDING):  albuterol/ipratropium for Nebulization 3 milliLiter(s) Nebulizer every 6 hours  aspirin enteric coated 81 milliGRAM(s) Oral daily  atorvastatin 40 milliGRAM(s) Oral at bedtime  budesonide 160 MICROgram(s)/formoterol 4.5 MICROgram(s) Inhaler 2 Puff(s) Inhalation two times a day  dextrose 5%. 1000 milliLiter(s) (50 mL/Hr) IV Continuous <Continuous>  dextrose 50% Injectable 12.5 Gram(s) IV Push once  dextrose 50% Injectable 25 Gram(s) IV Push once  dextrose 50% Injectable 25 Gram(s) IV Push once  enoxaparin Injectable 40 milliGRAM(s) SubCutaneous daily  furosemide    Tablet 20 milliGRAM(s) Oral daily  insulin glargine Injectable (LANTUS) 75 Unit(s) SubCutaneous at bedtime  insulin lispro (HumaLOG) corrective regimen sliding scale   SubCutaneous three times a day before meals  insulin lispro (HumaLOG) corrective regimen sliding scale   SubCutaneous at bedtime  insulin lispro Injectable (HumaLOG) 30 Unit(s) SubCutaneous three times a day with meals  lisinopril 2.5 milliGRAM(s) Oral daily  melatonin 5 milliGRAM(s) Oral at bedtime  nicotine - Inhaler 1 Each Inhalation daily  nystatin Powder 1 Application(s) Topical two times a day  pantoprazole    Tablet 40 milliGRAM(s) Oral daily  polyethylene glycol 3350 17 Gram(s) Oral every 12 hours  risperiDONE   Tablet 0.5 milliGRAM(s) Oral three times a day  senna 2 Tablet(s) Oral at bedtime  sertraline 50 milliGRAM(s) Oral at bedtime      Physical Exam  General: Patient comfortable in bed  Vital Signs Last 12 Hrs  T(F): 97.3 (02-26-20 @ 12:23), Max: 97.3 (02-26-20 @ 04:34)  HR: 80 (02-26-20 @ 12:23) (69 - 80)  BP: 107/72 (02-26-20 @ 12:23) (106/70 - 107/72)  BP(mean): --  RR: 18 (02-26-20 @ 12:23) (16 - 18)  SpO2: 96% (02-26-20 @ 12:23) (96% - 97%)  Neck: No palpable thyroid nodules.  CVS: S1S2, No murmurs  Respiratory: No wheezing, no crepitations  GI: Abdomen soft, bowel sounds positive  Musculoskeletal:  edema lower extremities.   Skin: No skin rashes, no ecchymosis    Diagnostics

## 2020-02-27 LAB
GLUCOSE BLDC GLUCOMTR-MCNC: 158 MG/DL — HIGH (ref 70–99)
GLUCOSE BLDC GLUCOMTR-MCNC: 234 MG/DL — HIGH (ref 70–99)
GLUCOSE BLDC GLUCOMTR-MCNC: 237 MG/DL — HIGH (ref 70–99)
GLUCOSE BLDC GLUCOMTR-MCNC: 281 MG/DL — HIGH (ref 70–99)

## 2020-02-27 RX ADMIN — INSULIN GLARGINE 75 UNIT(S): 100 INJECTION, SOLUTION SUBCUTANEOUS at 22:05

## 2020-02-27 RX ADMIN — NYSTATIN CREAM 1 APPLICATION(S): 100000 CREAM TOPICAL at 05:21

## 2020-02-27 RX ADMIN — Medication 3 MILLILITER(S): at 17:51

## 2020-02-27 RX ADMIN — BUDESONIDE AND FORMOTEROL FUMARATE DIHYDRATE 2 PUFF(S): 160; 4.5 AEROSOL RESPIRATORY (INHALATION) at 05:20

## 2020-02-27 RX ADMIN — OXYCODONE AND ACETAMINOPHEN 2 TABLET(S): 5; 325 TABLET ORAL at 09:30

## 2020-02-27 RX ADMIN — Medication 650 MILLIGRAM(S): at 12:15

## 2020-02-27 RX ADMIN — Medication 30 UNIT(S): at 08:46

## 2020-02-27 RX ADMIN — Medication 3 MILLILITER(S): at 05:24

## 2020-02-27 RX ADMIN — Medication 2: at 08:45

## 2020-02-27 RX ADMIN — PANTOPRAZOLE SODIUM 40 MILLIGRAM(S): 20 TABLET, DELAYED RELEASE ORAL at 05:19

## 2020-02-27 RX ADMIN — Medication 3 MILLILITER(S): at 23:02

## 2020-02-27 RX ADMIN — Medication 1: at 17:51

## 2020-02-27 RX ADMIN — Medication 30 UNIT(S): at 12:49

## 2020-02-27 RX ADMIN — Medication 3 MILLILITER(S): at 11:31

## 2020-02-27 RX ADMIN — LISINOPRIL 2.5 MILLIGRAM(S): 2.5 TABLET ORAL at 05:19

## 2020-02-27 RX ADMIN — Medication 2: at 12:49

## 2020-02-27 RX ADMIN — Medication 1: at 22:06

## 2020-02-27 RX ADMIN — OXYCODONE AND ACETAMINOPHEN 2 TABLET(S): 5; 325 TABLET ORAL at 08:50

## 2020-02-27 RX ADMIN — RISPERIDONE 0.5 MILLIGRAM(S): 4 TABLET ORAL at 05:20

## 2020-02-27 RX ADMIN — OXYCODONE AND ACETAMINOPHEN 2 TABLET(S): 5; 325 TABLET ORAL at 23:02

## 2020-02-27 RX ADMIN — Medication 20 MILLIGRAM(S): at 05:19

## 2020-02-27 RX ADMIN — Medication 30 UNIT(S): at 17:51

## 2020-02-27 RX ADMIN — RISPERIDONE 0.5 MILLIGRAM(S): 4 TABLET ORAL at 21:21

## 2020-02-27 RX ADMIN — Medication 650 MILLIGRAM(S): at 11:31

## 2020-02-27 RX ADMIN — ATORVASTATIN CALCIUM 40 MILLIGRAM(S): 80 TABLET, FILM COATED ORAL at 21:21

## 2020-02-27 RX ADMIN — NYSTATIN CREAM 1 APPLICATION(S): 100000 CREAM TOPICAL at 21:21

## 2020-02-27 RX ADMIN — SERTRALINE 50 MILLIGRAM(S): 25 TABLET, FILM COATED ORAL at 21:21

## 2020-02-27 RX ADMIN — BUDESONIDE AND FORMOTEROL FUMARATE DIHYDRATE 2 PUFF(S): 160; 4.5 AEROSOL RESPIRATORY (INHALATION) at 17:52

## 2020-02-27 RX ADMIN — RISPERIDONE 0.5 MILLIGRAM(S): 4 TABLET ORAL at 13:14

## 2020-02-27 RX ADMIN — ENOXAPARIN SODIUM 40 MILLIGRAM(S): 100 INJECTION SUBCUTANEOUS at 12:48

## 2020-02-27 RX ADMIN — Medication 81 MILLIGRAM(S): at 11:31

## 2020-02-27 RX ADMIN — OXYCODONE AND ACETAMINOPHEN 2 TABLET(S): 5; 325 TABLET ORAL at 16:15

## 2020-02-27 RX ADMIN — SENNA PLUS 2 TABLET(S): 8.6 TABLET ORAL at 21:21

## 2020-02-27 RX ADMIN — Medication 5 MILLIGRAM(S): at 21:21

## 2020-02-27 RX ADMIN — OXYCODONE AND ACETAMINOPHEN 2 TABLET(S): 5; 325 TABLET ORAL at 22:07

## 2020-02-27 RX ADMIN — OXYCODONE AND ACETAMINOPHEN 2 TABLET(S): 5; 325 TABLET ORAL at 15:20

## 2020-02-27 NOTE — PROGRESS NOTE ADULT - SUBJECTIVE AND OBJECTIVE BOX
Chief complaint  Patient is a 59y old  Male who presents with a chief complaint of 59M w/ n/v/d and hyperglycemia sent in from [facility] (26 Feb 2020 18:04)   Review of systems  Patient in bed, looks comfortable, no hypoglycemia.    Labs and Fingersticks  CAPILLARY BLOOD GLUCOSE      POCT Blood Glucose.: 234 mg/dL (27 Feb 2020 12:25)  POCT Blood Glucose.: 237 mg/dL (27 Feb 2020 08:14)  POCT Blood Glucose.: 211 mg/dL (26 Feb 2020 22:05)  POCT Blood Glucose.: 161 mg/dL (26 Feb 2020 17:16)                        Medications  MEDICATIONS  (STANDING):  albuterol/ipratropium for Nebulization 3 milliLiter(s) Nebulizer every 6 hours  aspirin enteric coated 81 milliGRAM(s) Oral daily  atorvastatin 40 milliGRAM(s) Oral at bedtime  budesonide 160 MICROgram(s)/formoterol 4.5 MICROgram(s) Inhaler 2 Puff(s) Inhalation two times a day  dextrose 5%. 1000 milliLiter(s) (50 mL/Hr) IV Continuous <Continuous>  dextrose 50% Injectable 12.5 Gram(s) IV Push once  dextrose 50% Injectable 25 Gram(s) IV Push once  dextrose 50% Injectable 25 Gram(s) IV Push once  enoxaparin Injectable 40 milliGRAM(s) SubCutaneous daily  furosemide    Tablet 20 milliGRAM(s) Oral daily  insulin glargine Injectable (LANTUS) 75 Unit(s) SubCutaneous at bedtime  insulin lispro (HumaLOG) corrective regimen sliding scale   SubCutaneous three times a day before meals  insulin lispro (HumaLOG) corrective regimen sliding scale   SubCutaneous at bedtime  insulin lispro Injectable (HumaLOG) 30 Unit(s) SubCutaneous three times a day with meals  lisinopril 2.5 milliGRAM(s) Oral daily  melatonin 5 milliGRAM(s) Oral at bedtime  nicotine - Inhaler 1 Each Inhalation daily  nystatin Powder 1 Application(s) Topical two times a day  pantoprazole    Tablet 40 milliGRAM(s) Oral daily  polyethylene glycol 3350 17 Gram(s) Oral every 12 hours  risperiDONE   Tablet 0.5 milliGRAM(s) Oral three times a day  senna 2 Tablet(s) Oral at bedtime  sertraline 50 milliGRAM(s) Oral at bedtime      Physical Exam  General: Patient comfortable in bed  Vital Signs Last 12 Hrs  T(F): 98.4 (02-27-20 @ 05:00), Max: 98.4 (02-27-20 @ 05:00)  HR: 73 (02-27-20 @ 05:00) (73 - 73)  BP: 102/60 (02-27-20 @ 05:00) (102/60 - 102/60)  BP(mean): --  RR: 18 (02-27-20 @ 05:00) (18 - 18)  SpO2: 97% (02-27-20 @ 05:00) (97% - 97%)  Neck: No palpable thyroid nodules.  CVS: S1S2, No murmurs  Respiratory: No wheezing, no crepitations  GI: Abdomen soft, bowel sounds positive  Musculoskeletal:  edema lower extremities.   Skin: No skin rashes, no ecchymosis    Diagnostics Chief complaint  Patient is a 59y old  Male who presents with a chief complaint of 59M w/ n/v/d and hyperglycemia sent in from [facility] (26 Feb 2020 18:04)   Review of systems  Patient in bed, looks comfortable,  no hypoglycemia.    Labs and Fingersticks  CAPILLARY BLOOD GLUCOSE      POCT Blood Glucose.: 234 mg/dL (27 Feb 2020 12:25)  POCT Blood Glucose.: 237 mg/dL (27 Feb 2020 08:14)  POCT Blood Glucose.: 211 mg/dL (26 Feb 2020 22:05)  POCT Blood Glucose.: 161 mg/dL (26 Feb 2020 17:16)      Medications  MEDICATIONS  (STANDING):  albuterol/ipratropium for Nebulization 3 milliLiter(s) Nebulizer every 6 hours  aspirin enteric coated 81 milliGRAM(s) Oral daily  atorvastatin 40 milliGRAM(s) Oral at bedtime  budesonide 160 MICROgram(s)/formoterol 4.5 MICROgram(s) Inhaler 2 Puff(s) Inhalation two times a day  dextrose 5%. 1000 milliLiter(s) (50 mL/Hr) IV Continuous <Continuous>  dextrose 50% Injectable 12.5 Gram(s) IV Push once  dextrose 50% Injectable 25 Gram(s) IV Push once  dextrose 50% Injectable 25 Gram(s) IV Push once  enoxaparin Injectable 40 milliGRAM(s) SubCutaneous daily  furosemide    Tablet 20 milliGRAM(s) Oral daily  insulin glargine Injectable (LANTUS) 75 Unit(s) SubCutaneous at bedtime  insulin lispro (HumaLOG) corrective regimen sliding scale   SubCutaneous three times a day before meals  insulin lispro (HumaLOG) corrective regimen sliding scale   SubCutaneous at bedtime  insulin lispro Injectable (HumaLOG) 30 Unit(s) SubCutaneous three times a day with meals  lisinopril 2.5 milliGRAM(s) Oral daily  melatonin 5 milliGRAM(s) Oral at bedtime  nicotine - Inhaler 1 Each Inhalation daily  nystatin Powder 1 Application(s) Topical two times a day  pantoprazole    Tablet 40 milliGRAM(s) Oral daily  polyethylene glycol 3350 17 Gram(s) Oral every 12 hours  risperiDONE   Tablet 0.5 milliGRAM(s) Oral three times a day  senna 2 Tablet(s) Oral at bedtime  sertraline 50 milliGRAM(s) Oral at bedtime      Physical Exam  General: Patient comfortable in bed  Vital Signs Last 12 Hrs  T(F): 98.4 (02-27-20 @ 05:00), Max: 98.4 (02-27-20 @ 05:00)  HR: 73 (02-27-20 @ 05:00) (73 - 73)  BP: 102/60 (02-27-20 @ 05:00) (102/60 - 102/60)  BP(mean): --  RR: 18 (02-27-20 @ 05:00) (18 - 18)  SpO2: 97% (02-27-20 @ 05:00) (97% - 97%)  Neck: No palpable thyroid nodules.  CVS: S1S2, No murmurs  Respiratory: No wheezing, no crepitations  GI: Abdomen soft, bowel sounds positive  Musculoskeletal:  edema lower extremities.   Skin: No skin rashes, no ecchymosis    Diagnostics

## 2020-02-27 NOTE — PROGRESS NOTE ADULT - SUBJECTIVE AND OBJECTIVE BOX
Patient is a 59y old  Male who presents with a chief complaint of 59M w/ n/v/d and hyperglycemia sent in from [facility] (27 Feb 2020 14:05)      INTERVAL HPI/OVERNIGHT EVENTS:  T(C): 36.6 (02-27-20 @ 20:01), Max: 36.9 (02-27-20 @ 05:00)  HR: 90 (02-27-20 @ 20:01) (73 - 90)  BP: 110/69 (02-27-20 @ 20:01) (102/60 - 112/70)  RR: 21 (02-27-20 @ 20:01) (18 - 21)  SpO2: 93% (02-27-20 @ 20:01) (93% - 97%)  Wt(kg): --  I&O's Summary    26 Feb 2020 07:01  -  27 Feb 2020 07:00  --------------------------------------------------------  IN: 1780 mL / OUT: 2050 mL / NET: -270 mL    27 Feb 2020 07:01  -  27 Feb 2020 21:59  --------------------------------------------------------  IN: 530 mL / OUT: 500 mL / NET: 30 mL        PAST MEDICAL & SURGICAL HISTORY:  Oxygen dependent  Gastroesophageal reflux disease, esophagitis presence not specified  Smoker  Bipolar 1 disorder  Coronary artery disease involving native coronary artery of native heart without angina pectoris  Type 2 diabetes mellitus without complication, with long-term current use of insulin  Pulmonary HTN  HLD (hyperlipidemia)  Stented coronary artery  COPD (chronic obstructive pulmonary disease)  No significant past surgical history      SOCIAL HISTORY  Alcohol:  Tobacco:  Illicit substance use:    FAMILY HISTORY:    REVIEW OF SYSTEMS:  CONSTITUTIONAL: No fever, weight loss, or fatigue  EYES: No eye pain, visual disturbances, or discharge  ENMT:  No difficulty hearing, tinnitus, vertigo; No sinus or throat pain  NECK: No pain or stiffness  RESPIRATORY: No cough, wheezing, chills or hemoptysis; No shortness of breath  CARDIOVASCULAR: No chest pain, palpitations, dizziness, or leg swelling  GASTROINTESTINAL: No abdominal or epigastric pain. No nausea, vomiting, or hematemesis; No diarrhea or constipation. No melena or hematochezia.  GENITOURINARY: No dysuria, frequency, hematuria, or incontinence  NEUROLOGICAL: No headaches, memory loss, loss of strength, numbness, or tremors  SKIN: No itching, burning, rashes, or lesions   LYMPH NODES: No enlarged glands  ENDOCRINE: No heat or cold intolerance; No hair loss  MUSCULOSKELETAL: No joint pain or swelling; No muscle, back, or extremity pain  PSYCHIATRIC: No depression, anxiety, mood swings, or difficulty sleeping  HEME/LYMPH: No easy bruising, or bleeding gums  ALLERY AND IMMUNOLOGIC: No hives or eczema    RADIOLOGY & ADDITIONAL TESTS:    Imaging Personally Reviewed:  [ ] YES  [ ] NO    Consultant(s) Notes Reviewed:  [ ] YES  [ ] NO    PHYSICAL EXAM:  GENERAL: NAD, well-groomed, well-developed  HEAD:  Atraumatic, Normocephalic  EYES: EOMI, PERRLA, conjunctiva and sclera clear  ENMT: No tonsillar erythema, exudates, or enlargement; Moist mucous membranes, Good dentition, No lesions  NECK: Supple, No JVD, Normal thyroid  NERVOUS SYSTEM:  Alert & Oriented X3, Good concentration; Motor Strength 5/5 B/L upper and lower extremities; DTRs 2+ intact and symmetric  CHEST/LUNG: Clear to percussion bilaterally; No rales, rhonchi, wheezing, or rubs  HEART: Regular rate and rhythm; No murmurs, rubs, or gallops  ABDOMEN: Soft, Nontender, Nondistended; Bowel sounds present  EXTREMITIES:  2+ Peripheral Pulses, No clubbing, cyanosis, or edema  LYMPH: No lymphadenopathy noted  SKIN: No rashes or lesions    LABS:              CAPILLARY BLOOD GLUCOSE      POCT Blood Glucose.: 281 mg/dL (27 Feb 2020 21:48)  POCT Blood Glucose.: 158 mg/dL (27 Feb 2020 17:41)  POCT Blood Glucose.: 234 mg/dL (27 Feb 2020 12:25)  POCT Blood Glucose.: 237 mg/dL (27 Feb 2020 08:14)  POCT Blood Glucose.: 211 mg/dL (26 Feb 2020 22:05)            MEDICATIONS  (STANDING):  albuterol/ipratropium for Nebulization 3 milliLiter(s) Nebulizer every 6 hours  aspirin enteric coated 81 milliGRAM(s) Oral daily  atorvastatin 40 milliGRAM(s) Oral at bedtime  budesonide 160 MICROgram(s)/formoterol 4.5 MICROgram(s) Inhaler 2 Puff(s) Inhalation two times a day  dextrose 5%. 1000 milliLiter(s) (50 mL/Hr) IV Continuous <Continuous>  dextrose 50% Injectable 12.5 Gram(s) IV Push once  dextrose 50% Injectable 25 Gram(s) IV Push once  dextrose 50% Injectable 25 Gram(s) IV Push once  enoxaparin Injectable 40 milliGRAM(s) SubCutaneous daily  furosemide    Tablet 20 milliGRAM(s) Oral daily  insulin glargine Injectable (LANTUS) 75 Unit(s) SubCutaneous at bedtime  insulin lispro (HumaLOG) corrective regimen sliding scale   SubCutaneous three times a day before meals  insulin lispro (HumaLOG) corrective regimen sliding scale   SubCutaneous at bedtime  insulin lispro Injectable (HumaLOG) 30 Unit(s) SubCutaneous three times a day with meals  lisinopril 2.5 milliGRAM(s) Oral daily  melatonin 5 milliGRAM(s) Oral at bedtime  nicotine - Inhaler 1 Each Inhalation daily  nystatin Powder 1 Application(s) Topical two times a day  pantoprazole    Tablet 40 milliGRAM(s) Oral daily  polyethylene glycol 3350 17 Gram(s) Oral every 12 hours  risperiDONE   Tablet 0.5 milliGRAM(s) Oral three times a day  senna 2 Tablet(s) Oral at bedtime  sertraline 50 milliGRAM(s) Oral at bedtime    MEDICATIONS  (PRN):  acetaminophen   Tablet .. 650 milliGRAM(s) Oral every 6 hours PRN Mild Pain (1 - 3)  aluminum hydroxide/magnesium hydroxide/simethicone Suspension 30 milliLiter(s) Oral every 4 hours PRN Dyspepsia  bisacodyl 5 milliGRAM(s) Oral every 12 hours PRN Constipation  dextrose 40% Gel 15 Gram(s) Oral once PRN Blood Glucose LESS THAN 70 milliGRAM(s)/deciliter  glucagon  Injectable 1 milliGRAM(s) IntraMuscular once PRN Glucose LESS THAN 70 milligrams/deciliter  oxycodone    5 mG/acetaminophen 325 mG 2 Tablet(s) Oral every 6 hours PRN Moderate Pain (4 - 6)      Care Discussed with Consultants/Other Providers [ ] YES  [ ] NO

## 2020-02-27 NOTE — PROGRESS NOTE ADULT - ASSESSMENT
Assessment  DMT2: 59y Male with DM T2 with hyperglycemia, A1C 10.4%, on basal bolus insulin, blood sugars fluctuating, FS running high today, no hypoglycemic episodes. Patient is eating full meals with good appetite, alert and comfortable, no acute events, still awaiting placement to rehab/shelter. Care coordinator FU noted.  CAD: on medications, no chest pain, stable, monitored.  HTN: Controlled,  on antihypertensive medications.  Overweight/Obesity: No strict exercise routines, not on any weight loss program,  neither on low  calorie diet.          Laury Romero MD  Cell: [phone]  Office: [phone]

## 2020-02-28 LAB
GLUCOSE BLDC GLUCOMTR-MCNC: 132 MG/DL — HIGH (ref 70–99)
GLUCOSE BLDC GLUCOMTR-MCNC: 141 MG/DL — HIGH (ref 70–99)
GLUCOSE BLDC GLUCOMTR-MCNC: 203 MG/DL — HIGH (ref 70–99)
GLUCOSE BLDC GLUCOMTR-MCNC: 256 MG/DL — HIGH (ref 70–99)

## 2020-02-28 RX ADMIN — RISPERIDONE 0.5 MILLIGRAM(S): 4 TABLET ORAL at 12:57

## 2020-02-28 RX ADMIN — Medication 650 MILLIGRAM(S): at 13:45

## 2020-02-28 RX ADMIN — INSULIN GLARGINE 75 UNIT(S): 100 INJECTION, SOLUTION SUBCUTANEOUS at 22:21

## 2020-02-28 RX ADMIN — Medication 3 MILLILITER(S): at 06:21

## 2020-02-28 RX ADMIN — Medication 650 MILLIGRAM(S): at 12:55

## 2020-02-28 RX ADMIN — Medication 1: at 22:21

## 2020-02-28 RX ADMIN — ATORVASTATIN CALCIUM 40 MILLIGRAM(S): 80 TABLET, FILM COATED ORAL at 21:25

## 2020-02-28 RX ADMIN — NYSTATIN CREAM 1 APPLICATION(S): 100000 CREAM TOPICAL at 06:21

## 2020-02-28 RX ADMIN — Medication 3 MILLILITER(S): at 12:54

## 2020-02-28 RX ADMIN — OXYCODONE AND ACETAMINOPHEN 2 TABLET(S): 5; 325 TABLET ORAL at 09:20

## 2020-02-28 RX ADMIN — LISINOPRIL 2.5 MILLIGRAM(S): 2.5 TABLET ORAL at 06:22

## 2020-02-28 RX ADMIN — OXYCODONE AND ACETAMINOPHEN 2 TABLET(S): 5; 325 TABLET ORAL at 17:42

## 2020-02-28 RX ADMIN — BUDESONIDE AND FORMOTEROL FUMARATE DIHYDRATE 2 PUFF(S): 160; 4.5 AEROSOL RESPIRATORY (INHALATION) at 17:38

## 2020-02-28 RX ADMIN — SENNA PLUS 2 TABLET(S): 8.6 TABLET ORAL at 21:26

## 2020-02-28 RX ADMIN — BUDESONIDE AND FORMOTEROL FUMARATE DIHYDRATE 2 PUFF(S): 160; 4.5 AEROSOL RESPIRATORY (INHALATION) at 06:21

## 2020-02-28 RX ADMIN — PANTOPRAZOLE SODIUM 40 MILLIGRAM(S): 20 TABLET, DELAYED RELEASE ORAL at 06:22

## 2020-02-28 RX ADMIN — RISPERIDONE 0.5 MILLIGRAM(S): 4 TABLET ORAL at 06:22

## 2020-02-28 RX ADMIN — NYSTATIN CREAM 1 APPLICATION(S): 100000 CREAM TOPICAL at 21:28

## 2020-02-28 RX ADMIN — ENOXAPARIN SODIUM 40 MILLIGRAM(S): 100 INJECTION SUBCUTANEOUS at 12:58

## 2020-02-28 RX ADMIN — OXYCODONE AND ACETAMINOPHEN 2 TABLET(S): 5; 325 TABLET ORAL at 18:33

## 2020-02-28 RX ADMIN — Medication 650 MILLIGRAM(S): at 19:45

## 2020-02-28 RX ADMIN — Medication 3 MILLILITER(S): at 17:37

## 2020-02-28 RX ADMIN — Medication 2: at 08:26

## 2020-02-28 RX ADMIN — Medication 30 UNIT(S): at 12:54

## 2020-02-28 RX ADMIN — Medication 5 MILLIGRAM(S): at 21:25

## 2020-02-28 RX ADMIN — Medication 81 MILLIGRAM(S): at 12:53

## 2020-02-28 RX ADMIN — Medication 30 UNIT(S): at 17:43

## 2020-02-28 RX ADMIN — RISPERIDONE 0.5 MILLIGRAM(S): 4 TABLET ORAL at 21:26

## 2020-02-28 RX ADMIN — SERTRALINE 50 MILLIGRAM(S): 25 TABLET, FILM COATED ORAL at 21:26

## 2020-02-28 RX ADMIN — Medication 650 MILLIGRAM(S): at 20:15

## 2020-02-28 RX ADMIN — Medication 30 UNIT(S): at 08:26

## 2020-02-28 RX ADMIN — Medication 20 MILLIGRAM(S): at 06:22

## 2020-02-28 RX ADMIN — OXYCODONE AND ACETAMINOPHEN 2 TABLET(S): 5; 325 TABLET ORAL at 08:31

## 2020-02-28 NOTE — PROGRESS NOTE ADULT - ASSESSMENT
Assessment  DMT2: 59y Male with DM T2 with hyperglycemia, A1C 10.4%, on basal bolus insulin, blood sugars fluctuating, FS in overall acceptable range, no hypoglycemic episodes. Patient is eating full meals with good appetite, alert and comfortable, no acute events, still awaiting placement to rehab/shelter.   CAD: on medications, no chest pain, stable, monitored.  HTN: Controlled,  on antihypertensive medications.  Overweight/Obesity: No strict exercise routines, not on any weight loss program,  neither on low  calorie diet.          Laury Romero MD  Cell: [phone]  Office: [phone] Assessment  DMT2: 59y Male with DM T2 with hyperglycemia, A1C 10.4%, on basal bolus insulin, blood sugars fluctuating, FS in overall acceptable range, no hypoglycemic episodes. Patient is eating full meals with good appetite, alert and comfortable,   no acute events, still awaiting placement to rehab/shelter.   CAD: on medications, no chest pain, stable, monitored.  HTN: Controlled,  on antihypertensive medications.  Overweight/Obesity: No strict exercise routines, not on any weight loss program,  neither on low  calorie diet.          Laury Romero MD  Cell: [phone]  Office: [phone]

## 2020-02-28 NOTE — PROGRESS NOTE ADULT - SUBJECTIVE AND OBJECTIVE BOX
Patient is a 59y old  Male who presents with a chief complaint of 59M w/ n/v/d and hyperglycemia sent in from [facility] (28 Feb 2020 13:52)      INTERVAL HPI/OVERNIGHT EVENTS:  T(C): 37.3 (02-29-20 @ 01:33), Max: 37.3 (02-29-20 @ 01:33)  HR: 88 (02-29-20 @ 01:33) (78 - 95)  BP: 100/62 (02-29-20 @ 01:33) (96/58 - 127/75)  RR: 18 (02-29-20 @ 01:33) (18 - 19)  SpO2: 96% (02-29-20 @ 01:33) (95% - 97%)  Wt(kg): --  I&O's Summary    27 Feb 2020 07:01  -  28 Feb 2020 07:00  --------------------------------------------------------  IN: 530 mL / OUT: 1100 mL / NET: -570 mL    28 Feb 2020 07:01  -  29 Feb 2020 02:00  --------------------------------------------------------  IN: 860 mL / OUT: 1000 mL / NET: -140 mL        PAST MEDICAL & SURGICAL HISTORY:  Oxygen dependent  Gastroesophageal reflux disease, esophagitis presence not specified  Smoker  Bipolar 1 disorder  Coronary artery disease involving native coronary artery of native heart without angina pectoris  Type 2 diabetes mellitus without complication, with long-term current use of insulin  Pulmonary HTN  HLD (hyperlipidemia)  Stented coronary artery  COPD (chronic obstructive pulmonary disease)  No significant past surgical history      SOCIAL HISTORY  Alcohol:  Tobacco:  Illicit substance use:    FAMILY HISTORY:    REVIEW OF SYSTEMS:  CONSTITUTIONAL: No fever, weight loss, or fatigue  EYES: No eye pain, visual disturbances, or discharge  ENMT:  No difficulty hearing, tinnitus, vertigo; No sinus or throat pain  NECK: No pain or stiffness  RESPIRATORY: No cough, wheezing, chills or hemoptysis; No shortness of breath  CARDIOVASCULAR: No chest pain, palpitations, dizziness, or leg swelling  GASTROINTESTINAL: No abdominal or epigastric pain. No nausea, vomiting, or hematemesis; No diarrhea or constipation. No melena or hematochezia.  GENITOURINARY: No dysuria, frequency, hematuria, or incontinence  NEUROLOGICAL: No headaches, memory loss, loss of strength, numbness, or tremors  SKIN: No itching, burning, rashes, or lesions   LYMPH NODES: No enlarged glands  ENDOCRINE: No heat or cold intolerance; No hair loss  MUSCULOSKELETAL: No joint pain or swelling; No muscle, back, or extremity pain  PSYCHIATRIC: No depression, anxiety, mood swings, or difficulty sleeping  HEME/LYMPH: No easy bruising, or bleeding gums  ALLERY AND IMMUNOLOGIC: No hives or eczema    RADIOLOGY & ADDITIONAL TESTS:    Imaging Personally Reviewed:  [ ] YES  [ ] NO    Consultant(s) Notes Reviewed:  [ ] YES  [ ] NO    PHYSICAL EXAM:  GENERAL: NAD, well-groomed, well-developed  HEAD:  Atraumatic, Normocephalic  EYES: EOMI, PERRLA, conjunctiva and sclera clear  ENMT: No tonsillar erythema, exudates, or enlargement; Moist mucous membranes, Good dentition, No lesions  NECK: Supple, No JVD, Normal thyroid  NERVOUS SYSTEM:  Alert & Oriented X3, Good concentration; Motor Strength 5/5 B/L upper and lower extremities; DTRs 2+ intact and symmetric  CHEST/LUNG: Clear to percussion bilaterally; No rales, rhonchi, wheezing, or rubs  HEART: Regular rate and rhythm; No murmurs, rubs, or gallops  ABDOMEN: Soft, Nontender, Nondistended; Bowel sounds present  EXTREMITIES:  2+ Peripheral Pulses, No clubbing, cyanosis, or edema  LYMPH: No lymphadenopathy noted  SKIN: No rashes or lesions    LABS:              CAPILLARY BLOOD GLUCOSE      POCT Blood Glucose.: 256 mg/dL (28 Feb 2020 21:45)  POCT Blood Glucose.: 141 mg/dL (28 Feb 2020 17:15)  POCT Blood Glucose.: 132 mg/dL (28 Feb 2020 12:04)  POCT Blood Glucose.: 203 mg/dL (28 Feb 2020 08:15)            MEDICATIONS  (STANDING):  albuterol/ipratropium for Nebulization 3 milliLiter(s) Nebulizer every 6 hours  aspirin enteric coated 81 milliGRAM(s) Oral daily  atorvastatin 40 milliGRAM(s) Oral at bedtime  budesonide 160 MICROgram(s)/formoterol 4.5 MICROgram(s) Inhaler 2 Puff(s) Inhalation two times a day  dextrose 5%. 1000 milliLiter(s) (50 mL/Hr) IV Continuous <Continuous>  dextrose 50% Injectable 12.5 Gram(s) IV Push once  dextrose 50% Injectable 25 Gram(s) IV Push once  dextrose 50% Injectable 25 Gram(s) IV Push once  enoxaparin Injectable 40 milliGRAM(s) SubCutaneous daily  furosemide    Tablet 20 milliGRAM(s) Oral daily  insulin glargine Injectable (LANTUS) 75 Unit(s) SubCutaneous at bedtime  insulin lispro (HumaLOG) corrective regimen sliding scale   SubCutaneous three times a day before meals  insulin lispro (HumaLOG) corrective regimen sliding scale   SubCutaneous at bedtime  insulin lispro Injectable (HumaLOG) 30 Unit(s) SubCutaneous three times a day with meals  lisinopril 2.5 milliGRAM(s) Oral daily  melatonin 5 milliGRAM(s) Oral at bedtime  nicotine - Inhaler 1 Each Inhalation daily  nystatin Powder 1 Application(s) Topical two times a day  pantoprazole    Tablet 40 milliGRAM(s) Oral daily  polyethylene glycol 3350 17 Gram(s) Oral every 12 hours  risperiDONE   Tablet 0.5 milliGRAM(s) Oral three times a day  senna 2 Tablet(s) Oral at bedtime  sertraline 50 milliGRAM(s) Oral at bedtime    MEDICATIONS  (PRN):  acetaminophen   Tablet .. 650 milliGRAM(s) Oral every 6 hours PRN Mild Pain (1 - 3)  aluminum hydroxide/magnesium hydroxide/simethicone Suspension 30 milliLiter(s) Oral every 4 hours PRN Dyspepsia  bisacodyl 5 milliGRAM(s) Oral every 12 hours PRN Constipation  dextrose 40% Gel 15 Gram(s) Oral once PRN Blood Glucose LESS THAN 70 milliGRAM(s)/deciliter  glucagon  Injectable 1 milliGRAM(s) IntraMuscular once PRN Glucose LESS THAN 70 milligrams/deciliter  oxycodone    5 mG/acetaminophen 325 mG 2 Tablet(s) Oral every 6 hours PRN Moderate Pain (4 - 6)      Care Discussed with Consultants/Other Providers [ ] YES  [ ] NO

## 2020-02-28 NOTE — PROGRESS NOTE ADULT - SUBJECTIVE AND OBJECTIVE BOX
Chief complaint  Patient is a 59y old  Male who presents with a chief complaint of 59M w/ n/v/d and hyperglycemia sent in from [facility] (27 Feb 2020 21:59)   Review of systems  Patient in bed, looks comfortable, no hypoglycemia.    Labs and Fingersticks  CAPILLARY BLOOD GLUCOSE      POCT Blood Glucose.: 132 mg/dL (28 Feb 2020 12:04)  POCT Blood Glucose.: 203 mg/dL (28 Feb 2020 08:15)  POCT Blood Glucose.: 281 mg/dL (27 Feb 2020 21:48)  POCT Blood Glucose.: 158 mg/dL (27 Feb 2020 17:41)                        Medications  MEDICATIONS  (STANDING):  albuterol/ipratropium for Nebulization 3 milliLiter(s) Nebulizer every 6 hours  aspirin enteric coated 81 milliGRAM(s) Oral daily  atorvastatin 40 milliGRAM(s) Oral at bedtime  budesonide 160 MICROgram(s)/formoterol 4.5 MICROgram(s) Inhaler 2 Puff(s) Inhalation two times a day  dextrose 5%. 1000 milliLiter(s) (50 mL/Hr) IV Continuous <Continuous>  dextrose 50% Injectable 12.5 Gram(s) IV Push once  dextrose 50% Injectable 25 Gram(s) IV Push once  dextrose 50% Injectable 25 Gram(s) IV Push once  enoxaparin Injectable 40 milliGRAM(s) SubCutaneous daily  furosemide    Tablet 20 milliGRAM(s) Oral daily  insulin glargine Injectable (LANTUS) 75 Unit(s) SubCutaneous at bedtime  insulin lispro (HumaLOG) corrective regimen sliding scale   SubCutaneous three times a day before meals  insulin lispro (HumaLOG) corrective regimen sliding scale   SubCutaneous at bedtime  insulin lispro Injectable (HumaLOG) 30 Unit(s) SubCutaneous three times a day with meals  lisinopril 2.5 milliGRAM(s) Oral daily  melatonin 5 milliGRAM(s) Oral at bedtime  nicotine - Inhaler 1 Each Inhalation daily  nystatin Powder 1 Application(s) Topical two times a day  pantoprazole    Tablet 40 milliGRAM(s) Oral daily  polyethylene glycol 3350 17 Gram(s) Oral every 12 hours  risperiDONE   Tablet 0.5 milliGRAM(s) Oral three times a day  senna 2 Tablet(s) Oral at bedtime  sertraline 50 milliGRAM(s) Oral at bedtime      Physical Exam  General: Patient comfortable in bed  Vital Signs Last 12 Hrs  T(F): 98.3 (02-28-20 @ 13:03), Max: 98.3 (02-28-20 @ 13:03)  HR: 78 (02-28-20 @ 13:03) (78 - 88)  BP: 127/75 (02-28-20 @ 13:03) (109/68 - 127/75)  BP(mean): --  RR: 18 (02-28-20 @ 13:03) (18 - 19)  SpO2: 95% (02-28-20 @ 13:03) (95% - 95%)  Neck: No palpable thyroid nodules.  CVS: S1S2, No murmurs  Respiratory: No wheezing, no crepitations  GI: Abdomen soft, bowel sounds positive  Musculoskeletal:  edema lower extremities.   Skin: No skin rashes, no ecchymosis    Diagnostics Chief complaint  Patient is a 59y old  Male who presents with a chief complaint of 59M w/ n/v/d and hyperglycemia sent in from [facility] (27 Feb 2020 21:59)   Review of systems  Patient in bed, looks comfortable,  no hypoglycemia.    Labs and Fingersticks  CAPILLARY BLOOD GLUCOSE      POCT Blood Glucose.: 132 mg/dL (28 Feb 2020 12:04)  POCT Blood Glucose.: 203 mg/dL (28 Feb 2020 08:15)  POCT Blood Glucose.: 281 mg/dL (27 Feb 2020 21:48)  POCT Blood Glucose.: 158 mg/dL (27 Feb 2020 17:41)      Medications  MEDICATIONS  (STANDING):  albuterol/ipratropium for Nebulization 3 milliLiter(s) Nebulizer every 6 hours  aspirin enteric coated 81 milliGRAM(s) Oral daily  atorvastatin 40 milliGRAM(s) Oral at bedtime  budesonide 160 MICROgram(s)/formoterol 4.5 MICROgram(s) Inhaler 2 Puff(s) Inhalation two times a day  dextrose 5%. 1000 milliLiter(s) (50 mL/Hr) IV Continuous <Continuous>  dextrose 50% Injectable 12.5 Gram(s) IV Push once  dextrose 50% Injectable 25 Gram(s) IV Push once  dextrose 50% Injectable 25 Gram(s) IV Push once  enoxaparin Injectable 40 milliGRAM(s) SubCutaneous daily  furosemide    Tablet 20 milliGRAM(s) Oral daily  insulin glargine Injectable (LANTUS) 75 Unit(s) SubCutaneous at bedtime  insulin lispro (HumaLOG) corrective regimen sliding scale   SubCutaneous three times a day before meals  insulin lispro (HumaLOG) corrective regimen sliding scale   SubCutaneous at bedtime  insulin lispro Injectable (HumaLOG) 30 Unit(s) SubCutaneous three times a day with meals  lisinopril 2.5 milliGRAM(s) Oral daily  melatonin 5 milliGRAM(s) Oral at bedtime  nicotine - Inhaler 1 Each Inhalation daily  nystatin Powder 1 Application(s) Topical two times a day  pantoprazole    Tablet 40 milliGRAM(s) Oral daily  polyethylene glycol 3350 17 Gram(s) Oral every 12 hours  risperiDONE   Tablet 0.5 milliGRAM(s) Oral three times a day  senna 2 Tablet(s) Oral at bedtime  sertraline 50 milliGRAM(s) Oral at bedtime      Physical Exam  General: Patient comfortable in bed  Vital Signs Last 12 Hrs  T(F): 98.3 (02-28-20 @ 13:03), Max: 98.3 (02-28-20 @ 13:03)  HR: 78 (02-28-20 @ 13:03) (78 - 88)  BP: 127/75 (02-28-20 @ 13:03) (109/68 - 127/75)  BP(mean): --  RR: 18 (02-28-20 @ 13:03) (18 - 19)  SpO2: 95% (02-28-20 @ 13:03) (95% - 95%)  Neck: No palpable thyroid nodules.  CVS: S1S2, No murmurs  Respiratory: No wheezing, no crepitations  GI: Abdomen soft, bowel sounds positive  Musculoskeletal:  edema lower extremities.   Skin: No skin rashes, no ecchymosis    Diagnostics

## 2020-02-29 LAB
ANION GAP SERPL CALC-SCNC: 13 MMOL/L — SIGNIFICANT CHANGE UP (ref 5–17)
BUN SERPL-MCNC: 21 MG/DL — SIGNIFICANT CHANGE UP (ref 7–23)
CALCIUM SERPL-MCNC: 9 MG/DL — SIGNIFICANT CHANGE UP (ref 8.4–10.5)
CHLORIDE SERPL-SCNC: 96 MMOL/L — SIGNIFICANT CHANGE UP (ref 96–108)
CO2 SERPL-SCNC: 27 MMOL/L — SIGNIFICANT CHANGE UP (ref 22–31)
CREAT SERPL-MCNC: 0.59 MG/DL — SIGNIFICANT CHANGE UP (ref 0.5–1.3)
GLUCOSE BLDC GLUCOMTR-MCNC: 142 MG/DL — HIGH (ref 70–99)
GLUCOSE BLDC GLUCOMTR-MCNC: 190 MG/DL — HIGH (ref 70–99)
GLUCOSE BLDC GLUCOMTR-MCNC: 204 MG/DL — HIGH (ref 70–99)
GLUCOSE BLDC GLUCOMTR-MCNC: 211 MG/DL — HIGH (ref 70–99)
GLUCOSE SERPL-MCNC: 212 MG/DL — HIGH (ref 70–99)
HCT VFR BLD CALC: 36.4 % — LOW (ref 39–50)
HGB BLD-MCNC: 11.4 G/DL — LOW (ref 13–17)
MCHC RBC-ENTMCNC: 28.4 PG — SIGNIFICANT CHANGE UP (ref 27–34)
MCHC RBC-ENTMCNC: 31.3 GM/DL — LOW (ref 32–36)
MCV RBC AUTO: 90.8 FL — SIGNIFICANT CHANGE UP (ref 80–100)
NRBC # BLD: 0 /100 WBCS — SIGNIFICANT CHANGE UP (ref 0–0)
PLATELET # BLD AUTO: 198 K/UL — SIGNIFICANT CHANGE UP (ref 150–400)
POTASSIUM SERPL-MCNC: 4.5 MMOL/L — SIGNIFICANT CHANGE UP (ref 3.5–5.3)
POTASSIUM SERPL-SCNC: 4.5 MMOL/L — SIGNIFICANT CHANGE UP (ref 3.5–5.3)
RBC # BLD: 4.01 M/UL — LOW (ref 4.2–5.8)
RBC # FLD: 13 % — SIGNIFICANT CHANGE UP (ref 10.3–14.5)
SODIUM SERPL-SCNC: 136 MMOL/L — SIGNIFICANT CHANGE UP (ref 135–145)
WBC # BLD: 9.56 K/UL — SIGNIFICANT CHANGE UP (ref 3.8–10.5)
WBC # FLD AUTO: 9.56 K/UL — SIGNIFICANT CHANGE UP (ref 3.8–10.5)

## 2020-02-29 RX ADMIN — Medication 81 MILLIGRAM(S): at 12:22

## 2020-02-29 RX ADMIN — Medication 30 UNIT(S): at 17:27

## 2020-02-29 RX ADMIN — Medication 2: at 09:02

## 2020-02-29 RX ADMIN — Medication 20 MILLIGRAM(S): at 05:15

## 2020-02-29 RX ADMIN — ENOXAPARIN SODIUM 40 MILLIGRAM(S): 100 INJECTION SUBCUTANEOUS at 12:23

## 2020-02-29 RX ADMIN — SENNA PLUS 2 TABLET(S): 8.6 TABLET ORAL at 20:39

## 2020-02-29 RX ADMIN — Medication 650 MILLIGRAM(S): at 16:44

## 2020-02-29 RX ADMIN — Medication 3 MILLILITER(S): at 06:08

## 2020-02-29 RX ADMIN — OXYCODONE AND ACETAMINOPHEN 2 TABLET(S): 5; 325 TABLET ORAL at 12:18

## 2020-02-29 RX ADMIN — NYSTATIN CREAM 1 APPLICATION(S): 100000 CREAM TOPICAL at 05:17

## 2020-02-29 RX ADMIN — NYSTATIN CREAM 1 APPLICATION(S): 100000 CREAM TOPICAL at 20:40

## 2020-02-29 RX ADMIN — Medication 2: at 17:28

## 2020-02-29 RX ADMIN — Medication 30 UNIT(S): at 12:42

## 2020-02-29 RX ADMIN — Medication 3 MILLILITER(S): at 17:26

## 2020-02-29 RX ADMIN — OXYCODONE AND ACETAMINOPHEN 2 TABLET(S): 5; 325 TABLET ORAL at 12:45

## 2020-02-29 RX ADMIN — RISPERIDONE 0.5 MILLIGRAM(S): 4 TABLET ORAL at 05:14

## 2020-02-29 RX ADMIN — Medication 3 MILLILITER(S): at 01:05

## 2020-02-29 RX ADMIN — Medication 3 MILLILITER(S): at 12:21

## 2020-02-29 RX ADMIN — OXYCODONE AND ACETAMINOPHEN 2 TABLET(S): 5; 325 TABLET ORAL at 20:18

## 2020-02-29 RX ADMIN — RISPERIDONE 0.5 MILLIGRAM(S): 4 TABLET ORAL at 20:39

## 2020-02-29 RX ADMIN — BUDESONIDE AND FORMOTEROL FUMARATE DIHYDRATE 2 PUFF(S): 160; 4.5 AEROSOL RESPIRATORY (INHALATION) at 17:27

## 2020-02-29 RX ADMIN — OXYCODONE AND ACETAMINOPHEN 2 TABLET(S): 5; 325 TABLET ORAL at 19:18

## 2020-02-29 RX ADMIN — Medication 30 UNIT(S): at 09:02

## 2020-02-29 RX ADMIN — SERTRALINE 50 MILLIGRAM(S): 25 TABLET, FILM COATED ORAL at 20:39

## 2020-02-29 RX ADMIN — RISPERIDONE 0.5 MILLIGRAM(S): 4 TABLET ORAL at 14:15

## 2020-02-29 RX ADMIN — OXYCODONE AND ACETAMINOPHEN 2 TABLET(S): 5; 325 TABLET ORAL at 06:08

## 2020-02-29 RX ADMIN — Medication 3 MILLILITER(S): at 23:39

## 2020-02-29 RX ADMIN — INSULIN GLARGINE 75 UNIT(S): 100 INJECTION, SOLUTION SUBCUTANEOUS at 22:42

## 2020-02-29 RX ADMIN — OXYCODONE AND ACETAMINOPHEN 2 TABLET(S): 5; 325 TABLET ORAL at 06:38

## 2020-02-29 RX ADMIN — Medication 650 MILLIGRAM(S): at 09:03

## 2020-02-29 RX ADMIN — Medication 650 MILLIGRAM(S): at 23:40

## 2020-02-29 RX ADMIN — BUDESONIDE AND FORMOTEROL FUMARATE DIHYDRATE 2 PUFF(S): 160; 4.5 AEROSOL RESPIRATORY (INHALATION) at 05:15

## 2020-02-29 RX ADMIN — Medication 650 MILLIGRAM(S): at 09:30

## 2020-02-29 RX ADMIN — PANTOPRAZOLE SODIUM 40 MILLIGRAM(S): 20 TABLET, DELAYED RELEASE ORAL at 05:15

## 2020-02-29 RX ADMIN — ATORVASTATIN CALCIUM 40 MILLIGRAM(S): 80 TABLET, FILM COATED ORAL at 20:39

## 2020-02-29 RX ADMIN — Medication 5 MILLIGRAM(S): at 20:38

## 2020-02-29 RX ADMIN — LISINOPRIL 2.5 MILLIGRAM(S): 2.5 TABLET ORAL at 05:14

## 2020-02-29 NOTE — PROGRESS NOTE ADULT - ASSESSMENT
Assessment  DMT2: 59y Male with DM T2 with hyperglycemia, A1C 10.4%, on basal bolus insulin, FS in overall acceptable range, no hypoglycemic episodes. Patient is eating full meals with good appetite, alert and comfortable,   no acute events, still awaiting placement to rehab/shelter.   CAD: on medications, no chest pain, stable, monitored.  HTN: Controlled,  on antihypertensive medications.  Overweight/Obesity: No strict exercise routines, not on any weight loss program,  neither on low  calorie diet.          Laury Romero MD  Cell: [phone]  Office: [phone]

## 2020-02-29 NOTE — PROGRESS NOTE ADULT - SUBJECTIVE AND OBJECTIVE BOX
Chief complaint  Patient is a 59y old  Male who presents with a chief complaint of 59M w/ n/v/d and hyperglycemia sent in from [facility] (29 Feb 2020 18:59)   Review of systems  Patient in bed, looks comfortable, no fever, no hypoglycemia.    Labs and Fingersticks  CAPILLARY BLOOD GLUCOSE      POCT Blood Glucose.: 204 mg/dL (29 Feb 2020 17:03)  POCT Blood Glucose.: 142 mg/dL (29 Feb 2020 12:26)  POCT Blood Glucose.: 211 mg/dL (29 Feb 2020 08:51)  POCT Blood Glucose.: 256 mg/dL (28 Feb 2020 21:45)      Anion Gap, Serum: 13 (02-29 @ 07:21)      Calcium, Total Serum: 9.0 (02-29 @ 07:21)          02-29    136  |  96  |  21  ----------------------------<  212<H>  4.5   |  27  |  0.59    Ca    9.0      29 Feb 2020 07:21                          11.4   9.56  )-----------( 198      ( 29 Feb 2020 07:31 )             36.4     Medications  MEDICATIONS  (STANDING):  albuterol/ipratropium for Nebulization 3 milliLiter(s) Nebulizer every 6 hours  aspirin enteric coated 81 milliGRAM(s) Oral daily  atorvastatin 40 milliGRAM(s) Oral at bedtime  budesonide 160 MICROgram(s)/formoterol 4.5 MICROgram(s) Inhaler 2 Puff(s) Inhalation two times a day  dextrose 5%. 1000 milliLiter(s) (50 mL/Hr) IV Continuous <Continuous>  dextrose 50% Injectable 12.5 Gram(s) IV Push once  dextrose 50% Injectable 25 Gram(s) IV Push once  dextrose 50% Injectable 25 Gram(s) IV Push once  enoxaparin Injectable 40 milliGRAM(s) SubCutaneous daily  furosemide    Tablet 20 milliGRAM(s) Oral daily  insulin glargine Injectable (LANTUS) 75 Unit(s) SubCutaneous at bedtime  insulin lispro (HumaLOG) corrective regimen sliding scale   SubCutaneous three times a day before meals  insulin lispro (HumaLOG) corrective regimen sliding scale   SubCutaneous at bedtime  insulin lispro Injectable (HumaLOG) 30 Unit(s) SubCutaneous three times a day with meals  lisinopril 2.5 milliGRAM(s) Oral daily  melatonin 5 milliGRAM(s) Oral at bedtime  nicotine - Inhaler 1 Each Inhalation daily  nystatin Powder 1 Application(s) Topical two times a day  pantoprazole    Tablet 40 milliGRAM(s) Oral daily  polyethylene glycol 3350 17 Gram(s) Oral every 12 hours  risperiDONE   Tablet 0.5 milliGRAM(s) Oral three times a day  senna 2 Tablet(s) Oral at bedtime  sertraline 50 milliGRAM(s) Oral at bedtime      Physical Exam  General: Patient comfortable in bed  Vital Signs Last 12 Hrs  T(F): 97.6 (02-29-20 @ 11:49), Max: 97.6 (02-29-20 @ 11:49)  HR: 83 (02-29-20 @ 11:49) (83 - 83)  BP: 97/60 (02-29-20 @ 11:49) (97/60 - 97/60)  BP(mean): --  RR: 18 (02-29-20 @ 11:49) (18 - 18)  SpO2: 97% (02-29-20 @ 11:49) (97% - 97%)  Neck: No palpable thyroid nodules.  CVS: S1S2, No murmurs  Respiratory: No wheezing, no crepitations  GI: Abdomen soft, bowel sounds positive  Musculoskeletal:  edema lower extremities.   Skin: No skin rashes, no ecchymosis    Diagnostics

## 2020-02-29 NOTE — PROGRESS NOTE ADULT - SUBJECTIVE AND OBJECTIVE BOX
Patient is a 59y old  Male who presents with a chief complaint of 59M w/ n/v/d and hyperglycemia sent in from [facility] (28 Feb 2020 20:59)    pt. teto nd examined, no c/o . stable   awaiting placement    INTERVAL HPI/OVERNIGHT EVENTS:  T(C): 36.4 (02-29-20 @ 11:49), Max: 37.3 (02-29-20 @ 01:33)  HR: 83 (02-29-20 @ 11:49) (83 - 95)  BP: 97/60 (02-29-20 @ 11:49) (96/58 - 125/80)  RR: 18 (02-29-20 @ 11:49) (18 - 19)  SpO2: 97% (02-29-20 @ 11:49) (95% - 97%)  Wt(kg): --  I&O's Summary    28 Feb 2020 07:01  -  29 Feb 2020 07:00  --------------------------------------------------------  IN: 860 mL / OUT: 1300 mL / NET: -440 mL    29 Feb 2020 07:01  -  29 Feb 2020 18:59  --------------------------------------------------------  IN: 480 mL / OUT: 1401 mL / NET: -921 mL        PAST MEDICAL & SURGICAL HISTORY:  Oxygen dependent  Gastroesophageal reflux disease, esophagitis presence not specified  Smoker  Bipolar 1 disorder  Coronary artery disease involving native coronary artery of native heart without angina pectoris  Type 2 diabetes mellitus without complication, with long-term current use of insulin  Pulmonary HTN  HLD (hyperlipidemia)  Stented coronary artery  COPD (chronic obstructive pulmonary disease)  No significant past surgical history      SOCIAL HISTORY  Alcohol:  Tobacco:  Illicit substance use:    FAMILY HISTORY:    REVIEW OF SYSTEMS:  CONSTITUTIONAL: No fever, weight loss, or fatigue  EYES: No eye pain, visual disturbances, or discharge  ENMT:  No difficulty hearing, tinnitus, vertigo; No sinus or throat pain  NECK: No pain or stiffness  RESPIRATORY: No cough, wheezing, chills or hemoptysis; No shortness of breath  CARDIOVASCULAR: No chest pain, palpitations, dizziness, or leg swelling  GASTROINTESTINAL: No abdominal or epigastric pain. No nausea, vomiting, or hematemesis; No diarrhea or constipation. No melena or hematochezia.  GENITOURINARY: No dysuria, frequency, hematuria, or incontinence  NEUROLOGICAL: No headaches, memory loss, loss of strength, numbness, or tremors  SKIN: No itching, burning, rashes, or lesions   LYMPH NODES: No enlarged glands  ENDOCRINE: No heat or cold intolerance; No hair loss  MUSCULOSKELETAL: No joint pain or swelling; No muscle, back, or extremity pain  PSYCHIATRIC: No depression, anxiety, mood swings, or difficulty sleeping  HEME/LYMPH: No easy bruising, or bleeding gums  ALLERY AND IMMUNOLOGIC: No hives or eczema    RADIOLOGY & ADDITIONAL TESTS:    Imaging Personally Reviewed:  [ ] YES  [ ] NO    Consultant(s) Notes Reviewed:  [ ] YES  [ ] NO    PHYSICAL EXAM:  GENERAL: NAD, well-groomed, well-developed  HEAD:  Atraumatic, Normocephalic  EYES: EOMI, PERRLA, conjunctiva and sclera clear  ENMT: No tonsillar erythema, exudates, or enlargement; Moist mucous membranes, Good dentition, No lesions  NECK: Supple, No JVD, Normal thyroid  NERVOUS SYSTEM:  Alert & Oriented X3, Good concentration; Motor Strength 5/5 B/L upper and lower extremities; DTRs 2+ intact and symmetric  CHEST/LUNG: Clear to percussion bilaterally; No rales, rhonchi, wheezing, or rubs  HEART: Regular rate and rhythm; No murmurs, rubs, or gallops  ABDOMEN: Soft, Nontender, Nondistended; Bowel sounds present  EXTREMITIES:  2+ Peripheral Pulses, No clubbing, cyanosis, or edema  LYMPH: No lymphadenopathy noted  SKIN: No rashes or lesions    LABS:                        11.4   9.56  )-----------( 198      ( 29 Feb 2020 07:31 )             36.4     02-29    136  |  96  |  21  ----------------------------<  212<H>  4.5   |  27  |  0.59    Ca    9.0      29 Feb 2020 07:21          CAPILLARY BLOOD GLUCOSE      POCT Blood Glucose.: 204 mg/dL (29 Feb 2020 17:03)  POCT Blood Glucose.: 142 mg/dL (29 Feb 2020 12:26)  POCT Blood Glucose.: 211 mg/dL (29 Feb 2020 08:51)  POCT Blood Glucose.: 256 mg/dL (28 Feb 2020 21:45)            MEDICATIONS  (STANDING):  albuterol/ipratropium for Nebulization 3 milliLiter(s) Nebulizer every 6 hours  aspirin enteric coated 81 milliGRAM(s) Oral daily  atorvastatin 40 milliGRAM(s) Oral at bedtime  budesonide 160 MICROgram(s)/formoterol 4.5 MICROgram(s) Inhaler 2 Puff(s) Inhalation two times a day  dextrose 5%. 1000 milliLiter(s) (50 mL/Hr) IV Continuous <Continuous>  dextrose 50% Injectable 12.5 Gram(s) IV Push once  dextrose 50% Injectable 25 Gram(s) IV Push once  dextrose 50% Injectable 25 Gram(s) IV Push once  enoxaparin Injectable 40 milliGRAM(s) SubCutaneous daily  furosemide    Tablet 20 milliGRAM(s) Oral daily  insulin glargine Injectable (LANTUS) 75 Unit(s) SubCutaneous at bedtime  insulin lispro (HumaLOG) corrective regimen sliding scale   SubCutaneous three times a day before meals  insulin lispro (HumaLOG) corrective regimen sliding scale   SubCutaneous at bedtime  insulin lispro Injectable (HumaLOG) 30 Unit(s) SubCutaneous three times a day with meals  lisinopril 2.5 milliGRAM(s) Oral daily  melatonin 5 milliGRAM(s) Oral at bedtime  nicotine - Inhaler 1 Each Inhalation daily  nystatin Powder 1 Application(s) Topical two times a day  pantoprazole    Tablet 40 milliGRAM(s) Oral daily  polyethylene glycol 3350 17 Gram(s) Oral every 12 hours  risperiDONE   Tablet 0.5 milliGRAM(s) Oral three times a day  senna 2 Tablet(s) Oral at bedtime  sertraline 50 milliGRAM(s) Oral at bedtime    MEDICATIONS  (PRN):  acetaminophen   Tablet .. 650 milliGRAM(s) Oral every 6 hours PRN Mild Pain (1 - 3)  aluminum hydroxide/magnesium hydroxide/simethicone Suspension 30 milliLiter(s) Oral every 4 hours PRN Dyspepsia  bisacodyl 5 milliGRAM(s) Oral every 12 hours PRN Constipation  dextrose 40% Gel 15 Gram(s) Oral once PRN Blood Glucose LESS THAN 70 milliGRAM(s)/deciliter  glucagon  Injectable 1 milliGRAM(s) IntraMuscular once PRN Glucose LESS THAN 70 milligrams/deciliter  oxycodone    5 mG/acetaminophen 325 mG 2 Tablet(s) Oral every 6 hours PRN Moderate Pain (4 - 6)      Care Discussed with Consultants/Other Providers [ ] YES  [ ] NO

## 2020-03-01 LAB
GLUCOSE BLDC GLUCOMTR-MCNC: 109 MG/DL — HIGH (ref 70–99)
GLUCOSE BLDC GLUCOMTR-MCNC: 138 MG/DL — HIGH (ref 70–99)
GLUCOSE BLDC GLUCOMTR-MCNC: 182 MG/DL — HIGH (ref 70–99)
GLUCOSE BLDC GLUCOMTR-MCNC: 207 MG/DL — HIGH (ref 70–99)

## 2020-03-01 RX ADMIN — PANTOPRAZOLE SODIUM 40 MILLIGRAM(S): 20 TABLET, DELAYED RELEASE ORAL at 05:56

## 2020-03-01 RX ADMIN — ENOXAPARIN SODIUM 40 MILLIGRAM(S): 100 INJECTION SUBCUTANEOUS at 11:23

## 2020-03-01 RX ADMIN — RISPERIDONE 0.5 MILLIGRAM(S): 4 TABLET ORAL at 13:00

## 2020-03-01 RX ADMIN — OXYCODONE AND ACETAMINOPHEN 2 TABLET(S): 5; 325 TABLET ORAL at 21:59

## 2020-03-01 RX ADMIN — Medication 30 UNIT(S): at 17:37

## 2020-03-01 RX ADMIN — Medication 650 MILLIGRAM(S): at 05:57

## 2020-03-01 RX ADMIN — RISPERIDONE 0.5 MILLIGRAM(S): 4 TABLET ORAL at 05:56

## 2020-03-01 RX ADMIN — RISPERIDONE 0.5 MILLIGRAM(S): 4 TABLET ORAL at 21:54

## 2020-03-01 RX ADMIN — Medication 650 MILLIGRAM(S): at 06:52

## 2020-03-01 RX ADMIN — OXYCODONE AND ACETAMINOPHEN 2 TABLET(S): 5; 325 TABLET ORAL at 09:07

## 2020-03-01 RX ADMIN — SERTRALINE 50 MILLIGRAM(S): 25 TABLET, FILM COATED ORAL at 21:56

## 2020-03-01 RX ADMIN — ATORVASTATIN CALCIUM 40 MILLIGRAM(S): 80 TABLET, FILM COATED ORAL at 21:55

## 2020-03-01 RX ADMIN — Medication 650 MILLIGRAM(S): at 17:42

## 2020-03-01 RX ADMIN — Medication 650 MILLIGRAM(S): at 18:13

## 2020-03-01 RX ADMIN — Medication 650 MILLIGRAM(S): at 00:40

## 2020-03-01 RX ADMIN — NYSTATIN CREAM 1 APPLICATION(S): 100000 CREAM TOPICAL at 05:56

## 2020-03-01 RX ADMIN — Medication 30 UNIT(S): at 09:02

## 2020-03-01 RX ADMIN — Medication 5 MILLIGRAM(S): at 21:55

## 2020-03-01 RX ADMIN — Medication 650 MILLIGRAM(S): at 12:15

## 2020-03-01 RX ADMIN — OXYCODONE AND ACETAMINOPHEN 2 TABLET(S): 5; 325 TABLET ORAL at 14:59

## 2020-03-01 RX ADMIN — Medication 650 MILLIGRAM(S): at 11:22

## 2020-03-01 RX ADMIN — INSULIN GLARGINE 75 UNIT(S): 100 INJECTION, SOLUTION SUBCUTANEOUS at 21:54

## 2020-03-01 RX ADMIN — BUDESONIDE AND FORMOTEROL FUMARATE DIHYDRATE 2 PUFF(S): 160; 4.5 AEROSOL RESPIRATORY (INHALATION) at 05:56

## 2020-03-01 RX ADMIN — Medication 1: at 09:02

## 2020-03-01 RX ADMIN — Medication 3 MILLILITER(S): at 05:56

## 2020-03-01 RX ADMIN — Medication 30 UNIT(S): at 12:55

## 2020-03-01 RX ADMIN — BUDESONIDE AND FORMOTEROL FUMARATE DIHYDRATE 2 PUFF(S): 160; 4.5 AEROSOL RESPIRATORY (INHALATION) at 17:38

## 2020-03-01 RX ADMIN — Medication 3 MILLILITER(S): at 11:21

## 2020-03-01 RX ADMIN — LISINOPRIL 2.5 MILLIGRAM(S): 2.5 TABLET ORAL at 05:56

## 2020-03-01 RX ADMIN — OXYCODONE AND ACETAMINOPHEN 2 TABLET(S): 5; 325 TABLET ORAL at 20:59

## 2020-03-01 RX ADMIN — Medication 81 MILLIGRAM(S): at 11:23

## 2020-03-01 RX ADMIN — OXYCODONE AND ACETAMINOPHEN 2 TABLET(S): 5; 325 TABLET ORAL at 09:45

## 2020-03-01 RX ADMIN — OXYCODONE AND ACETAMINOPHEN 2 TABLET(S): 5; 325 TABLET ORAL at 15:30

## 2020-03-01 RX ADMIN — Medication 3 MILLILITER(S): at 17:38

## 2020-03-01 RX ADMIN — Medication 20 MILLIGRAM(S): at 05:56

## 2020-03-01 NOTE — PROGRESS NOTE ADULT - SUBJECTIVE AND OBJECTIVE BOX
Chief complaint  Patient is a 59y old  Male who presents with a chief complaint of 59M w/ n/v/d and hyperglycemia sent in from [facility] (29 Feb 2020 19:49)   Review of systems  Patient in bed, looks comfortable, no hypoglycemia.    Labs and Fingersticks  CAPILLARY BLOOD GLUCOSE      POCT Blood Glucose.: 138 mg/dL (01 Mar 2020 12:31)  POCT Blood Glucose.: 182 mg/dL (01 Mar 2020 08:39)  POCT Blood Glucose.: 190 mg/dL (29 Feb 2020 22:40)  POCT Blood Glucose.: 204 mg/dL (29 Feb 2020 17:03)      Anion Gap, Serum: 13 (02-29 @ 07:21)      Calcium, Total Serum: 9.0 (02-29 @ 07:21)          02-29    136  |  96  |  21  ----------------------------<  212<H>  4.5   |  27  |  0.59    Ca    9.0      29 Feb 2020 07:21                          11.4   9.56  )-----------( 198      ( 29 Feb 2020 07:31 )             36.4     Medications  MEDICATIONS  (STANDING):  albuterol/ipratropium for Nebulization 3 milliLiter(s) Nebulizer every 6 hours  aspirin enteric coated 81 milliGRAM(s) Oral daily  atorvastatin 40 milliGRAM(s) Oral at bedtime  budesonide 160 MICROgram(s)/formoterol 4.5 MICROgram(s) Inhaler 2 Puff(s) Inhalation two times a day  dextrose 5%. 1000 milliLiter(s) (50 mL/Hr) IV Continuous <Continuous>  dextrose 50% Injectable 12.5 Gram(s) IV Push once  dextrose 50% Injectable 25 Gram(s) IV Push once  dextrose 50% Injectable 25 Gram(s) IV Push once  enoxaparin Injectable 40 milliGRAM(s) SubCutaneous daily  furosemide    Tablet 20 milliGRAM(s) Oral daily  insulin glargine Injectable (LANTUS) 75 Unit(s) SubCutaneous at bedtime  insulin lispro (HumaLOG) corrective regimen sliding scale   SubCutaneous three times a day before meals  insulin lispro (HumaLOG) corrective regimen sliding scale   SubCutaneous at bedtime  insulin lispro Injectable (HumaLOG) 30 Unit(s) SubCutaneous three times a day with meals  lisinopril 2.5 milliGRAM(s) Oral daily  melatonin 5 milliGRAM(s) Oral at bedtime  nicotine - Inhaler 1 Each Inhalation daily  nystatin Powder 1 Application(s) Topical two times a day  pantoprazole    Tablet 40 milliGRAM(s) Oral daily  polyethylene glycol 3350 17 Gram(s) Oral every 12 hours  risperiDONE   Tablet 0.5 milliGRAM(s) Oral three times a day  senna 2 Tablet(s) Oral at bedtime  sertraline 50 milliGRAM(s) Oral at bedtime      Physical Exam  General: Patient comfortable in bed  Vital Signs Last 12 Hrs  T(F): 97.2 (03-01-20 @ 12:41), Max: 97.5 (03-01-20 @ 05:44)  HR: 76 (03-01-20 @ 12:41) (76 - 78)  BP: 124/77 (03-01-20 @ 12:41) (111/73 - 124/77)  BP(mean): --  RR: 18 (03-01-20 @ 12:41) (18 - 18)  SpO2: 92% (03-01-20 @ 12:41) (92% - 96%)  Neck: No palpable thyroid nodules.  CVS: S1S2, No murmurs  Respiratory: No wheezing, no crepitations  GI: Abdomen soft, bowel sounds positive  Musculoskeletal:  edema lower extremities.   Skin: No skin rashes, no ecchymosis    Diagnostics Chief complaint  Patient is a 59y old  Male who presents with a chief complaint of 59M w/ n/v/d and hyperglycemia sent in from [facility] (29 Feb 2020 19:49)   Review of systems  Patient in bed, looks comfortable,  no hypoglycemia.    Labs and Fingersticks  CAPILLARY BLOOD GLUCOSE      POCT Blood Glucose.: 138 mg/dL (01 Mar 2020 12:31)  POCT Blood Glucose.: 182 mg/dL (01 Mar 2020 08:39)  POCT Blood Glucose.: 190 mg/dL (29 Feb 2020 22:40)  POCT Blood Glucose.: 204 mg/dL (29 Feb 2020 17:03)      Anion Gap, Serum: 13 (02-29 @ 07:21)      Calcium, Total Serum: 9.0 (02-29 @ 07:21)          02-29    136  |  96  |  21  ----------------------------<  212<H>  4.5   |  27  |  0.59    Ca    9.0      29 Feb 2020 07:21                          11.4   9.56  )-----------( 198      ( 29 Feb 2020 07:31 )             36.4     Medications  MEDICATIONS  (STANDING):  albuterol/ipratropium for Nebulization 3 milliLiter(s) Nebulizer every 6 hours  aspirin enteric coated 81 milliGRAM(s) Oral daily  atorvastatin 40 milliGRAM(s) Oral at bedtime  budesonide 160 MICROgram(s)/formoterol 4.5 MICROgram(s) Inhaler 2 Puff(s) Inhalation two times a day  dextrose 5%. 1000 milliLiter(s) (50 mL/Hr) IV Continuous <Continuous>  dextrose 50% Injectable 12.5 Gram(s) IV Push once  dextrose 50% Injectable 25 Gram(s) IV Push once  dextrose 50% Injectable 25 Gram(s) IV Push once  enoxaparin Injectable 40 milliGRAM(s) SubCutaneous daily  furosemide    Tablet 20 milliGRAM(s) Oral daily  insulin glargine Injectable (LANTUS) 75 Unit(s) SubCutaneous at bedtime  insulin lispro (HumaLOG) corrective regimen sliding scale   SubCutaneous three times a day before meals  insulin lispro (HumaLOG) corrective regimen sliding scale   SubCutaneous at bedtime  insulin lispro Injectable (HumaLOG) 30 Unit(s) SubCutaneous three times a day with meals  lisinopril 2.5 milliGRAM(s) Oral daily  melatonin 5 milliGRAM(s) Oral at bedtime  nicotine - Inhaler 1 Each Inhalation daily  nystatin Powder 1 Application(s) Topical two times a day  pantoprazole    Tablet 40 milliGRAM(s) Oral daily  polyethylene glycol 3350 17 Gram(s) Oral every 12 hours  risperiDONE   Tablet 0.5 milliGRAM(s) Oral three times a day  senna 2 Tablet(s) Oral at bedtime  sertraline 50 milliGRAM(s) Oral at bedtime      Physical Exam  General: Patient comfortable in bed  Vital Signs Last 12 Hrs  T(F): 97.2 (03-01-20 @ 12:41), Max: 97.5 (03-01-20 @ 05:44)  HR: 76 (03-01-20 @ 12:41) (76 - 78)  BP: 124/77 (03-01-20 @ 12:41) (111/73 - 124/77)  BP(mean): --  RR: 18 (03-01-20 @ 12:41) (18 - 18)  SpO2: 92% (03-01-20 @ 12:41) (92% - 96%)  Neck: No palpable thyroid nodules.  CVS: S1S2, No murmurs  Respiratory: No wheezing, no crepitations  GI: Abdomen soft, bowel sounds positive  Musculoskeletal:  edema lower extremities.   Skin: No skin rashes, no ecchymosis    Diagnostics

## 2020-03-01 NOTE — PROGRESS NOTE ADULT - ASSESSMENT
Assessment  DMT2: 59y Male with DM T2 with hyperglycemia, A1C 10.4%, on basal bolus insulin, FS improved and trending within overall acceptable range, no hypoglycemic episodes. Patient is eating full meals with good appetite, alert and comfortable, no acute events. Sitting up in chair talking on the phone this afternoon. Still awaiting placement to rehab/shelter.   CAD: on medications, no chest pain, stable, monitored.  HTN: Controlled,  on antihypertensive medications.  Overweight/Obesity: No strict exercise routines, not on any weight loss program,  neither on low  calorie diet.          Laury Romero MD  Cell: [phone]  Office: [phone] Assessment  DMT2: 59y Male with DM T2 with hyperglycemia, A1C 10.4%, on basal bolus insulin, FS improved and trending within overall acceptable range, no hypoglycemic episodes. Patient is eating full meals with good appetite, alert and comfortable,  no acute events. Sitting up in chair talking on the phone this afternoon. Still awaiting placement to rehab/shelter.   CAD: on medications, no chest pain, stable, monitored.  HTN: Controlled,  on antihypertensive medications.  Overweight/Obesity: No strict exercise routines, not on any weight loss program,  neither on low  calorie diet.          Laury Romero MD  Cell: [phone]  Office: [phone]

## 2020-03-01 NOTE — PROGRESS NOTE ADULT - SUBJECTIVE AND OBJECTIVE BOX
Patient is a 59y old  Male who presents with a chief complaint of 59M w/ n/v/d and hyperglycemia sent in from [facility] (01 Mar 2020 15:56)      INTERVAL HPI/OVERNIGHT EVENTS:  T(C): 36.2 (03-01-20 @ 12:41), Max: 36.6 (03-01-20 @ 00:01)  HR: 76 (03-01-20 @ 12:41) (76 - 91)  BP: 124/77 (03-01-20 @ 12:41) (108/70 - 124/77)  RR: 18 (03-01-20 @ 12:41) (18 - 18)  SpO2: 92% (03-01-20 @ 12:41) (92% - 97%)  Wt(kg): --  I&O's Summary    29 Feb 2020 07:01  -  01 Mar 2020 07:00  --------------------------------------------------------  IN: 480 mL / OUT: 2526 mL / NET: -2046 mL    01 Mar 2020 07:01  -  01 Mar 2020 20:49  --------------------------------------------------------  IN: 480 mL / OUT: 800 mL / NET: -320 mL        PAST MEDICAL & SURGICAL HISTORY:  Oxygen dependent  Gastroesophageal reflux disease, esophagitis presence not specified  Smoker  Bipolar 1 disorder  Coronary artery disease involving native coronary artery of native heart without angina pectoris  Type 2 diabetes mellitus without complication, with long-term current use of insulin  Pulmonary HTN  HLD (hyperlipidemia)  Stented coronary artery  COPD (chronic obstructive pulmonary disease)  No significant past surgical history      SOCIAL HISTORY  Alcohol:  Tobacco:  Illicit substance use:    FAMILY HISTORY:    REVIEW OF SYSTEMS:  CONSTITUTIONAL: No fever, weight loss, or fatigue  EYES: No eye pain, visual disturbances, or discharge  ENMT:  No difficulty hearing, tinnitus, vertigo; No sinus or throat pain  NECK: No pain or stiffness  RESPIRATORY: No cough, wheezing, chills or hemoptysis; No shortness of breath  CARDIOVASCULAR: No chest pain, palpitations, dizziness, or leg swelling  GASTROINTESTINAL: No abdominal or epigastric pain. No nausea, vomiting, or hematemesis; No diarrhea or constipation. No melena or hematochezia.  GENITOURINARY: No dysuria, frequency, hematuria, or incontinence  NEUROLOGICAL: No headaches, memory loss, loss of strength, numbness, or tremors  SKIN: No itching, burning, rashes, or lesions   LYMPH NODES: No enlarged glands  ENDOCRINE: No heat or cold intolerance; No hair loss  MUSCULOSKELETAL: No joint pain or swelling; No muscle, back, or extremity pain  PSYCHIATRIC: No depression, anxiety, mood swings, or difficulty sleeping  HEME/LYMPH: No easy bruising, or bleeding gums  ALLERY AND IMMUNOLOGIC: No hives or eczema    RADIOLOGY & ADDITIONAL TESTS:    Imaging Personally Reviewed:  [ ] YES  [ ] NO    Consultant(s) Notes Reviewed:  [ ] YES  [ ] NO    PHYSICAL EXAM:  GENERAL: NAD, well-groomed, well-developed  HEAD:  Atraumatic, Normocephalic  EYES: EOMI, PERRLA, conjunctiva and sclera clear  ENMT: No tonsillar erythema, exudates, or enlargement; Moist mucous membranes, Good dentition, No lesions  NECK: Supple, No JVD, Normal thyroid  NERVOUS SYSTEM:  Alert & Oriented X3, Good concentration; Motor Strength 5/5 B/L upper and lower extremities; DTRs 2+ intact and symmetric  CHEST/LUNG: Clear to percussion bilaterally; No rales, rhonchi, wheezing, or rubs  HEART: Regular rate and rhythm; No murmurs, rubs, or gallops  ABDOMEN: Soft, Nontender, Nondistended; Bowel sounds present  EXTREMITIES:  2+ Peripheral Pulses, No clubbing, cyanosis, or edema  LYMPH: No lymphadenopathy noted  SKIN: No rashes or lesions    LABS:                        11.4   9.56  )-----------( 198      ( 29 Feb 2020 07:31 )             36.4     02-29    136  |  96  |  21  ----------------------------<  212<H>  4.5   |  27  |  0.59    Ca    9.0      29 Feb 2020 07:21          CAPILLARY BLOOD GLUCOSE      POCT Blood Glucose.: 109 mg/dL (01 Mar 2020 17:21)  POCT Blood Glucose.: 138 mg/dL (01 Mar 2020 12:31)  POCT Blood Glucose.: 182 mg/dL (01 Mar 2020 08:39)  POCT Blood Glucose.: 190 mg/dL (29 Feb 2020 22:40)            MEDICATIONS  (STANDING):  albuterol/ipratropium for Nebulization 3 milliLiter(s) Nebulizer every 6 hours  aspirin enteric coated 81 milliGRAM(s) Oral daily  atorvastatin 40 milliGRAM(s) Oral at bedtime  budesonide 160 MICROgram(s)/formoterol 4.5 MICROgram(s) Inhaler 2 Puff(s) Inhalation two times a day  dextrose 5%. 1000 milliLiter(s) (50 mL/Hr) IV Continuous <Continuous>  dextrose 50% Injectable 12.5 Gram(s) IV Push once  dextrose 50% Injectable 25 Gram(s) IV Push once  dextrose 50% Injectable 25 Gram(s) IV Push once  enoxaparin Injectable 40 milliGRAM(s) SubCutaneous daily  furosemide    Tablet 20 milliGRAM(s) Oral daily  insulin glargine Injectable (LANTUS) 75 Unit(s) SubCutaneous at bedtime  insulin lispro (HumaLOG) corrective regimen sliding scale   SubCutaneous three times a day before meals  insulin lispro (HumaLOG) corrective regimen sliding scale   SubCutaneous at bedtime  insulin lispro Injectable (HumaLOG) 30 Unit(s) SubCutaneous three times a day with meals  lisinopril 2.5 milliGRAM(s) Oral daily  melatonin 5 milliGRAM(s) Oral at bedtime  nicotine - Inhaler 1 Each Inhalation daily  nystatin Powder 1 Application(s) Topical two times a day  pantoprazole    Tablet 40 milliGRAM(s) Oral daily  polyethylene glycol 3350 17 Gram(s) Oral every 12 hours  risperiDONE   Tablet 0.5 milliGRAM(s) Oral three times a day  senna 2 Tablet(s) Oral at bedtime  sertraline 50 milliGRAM(s) Oral at bedtime    MEDICATIONS  (PRN):  acetaminophen   Tablet .. 650 milliGRAM(s) Oral every 6 hours PRN Mild Pain (1 - 3)  aluminum hydroxide/magnesium hydroxide/simethicone Suspension 30 milliLiter(s) Oral every 4 hours PRN Dyspepsia  bisacodyl 5 milliGRAM(s) Oral every 12 hours PRN Constipation  dextrose 40% Gel 15 Gram(s) Oral once PRN Blood Glucose LESS THAN 70 milliGRAM(s)/deciliter  glucagon  Injectable 1 milliGRAM(s) IntraMuscular once PRN Glucose LESS THAN 70 milligrams/deciliter  oxycodone    5 mG/acetaminophen 325 mG 2 Tablet(s) Oral every 6 hours PRN Moderate Pain (4 - 6)      Care Discussed with Consultants/Other Providers [ ] YES  [ ] NO

## 2020-03-02 LAB
GLUCOSE BLDC GLUCOMTR-MCNC: 167 MG/DL — HIGH (ref 70–99)
GLUCOSE BLDC GLUCOMTR-MCNC: 177 MG/DL — HIGH (ref 70–99)
GLUCOSE BLDC GLUCOMTR-MCNC: 177 MG/DL — HIGH (ref 70–99)
GLUCOSE BLDC GLUCOMTR-MCNC: 194 MG/DL — HIGH (ref 70–99)

## 2020-03-02 RX ADMIN — LISINOPRIL 2.5 MILLIGRAM(S): 2.5 TABLET ORAL at 05:34

## 2020-03-02 RX ADMIN — OXYCODONE AND ACETAMINOPHEN 2 TABLET(S): 5; 325 TABLET ORAL at 08:49

## 2020-03-02 RX ADMIN — PANTOPRAZOLE SODIUM 40 MILLIGRAM(S): 20 TABLET, DELAYED RELEASE ORAL at 05:33

## 2020-03-02 RX ADMIN — Medication 30 UNIT(S): at 17:28

## 2020-03-02 RX ADMIN — Medication 1: at 12:48

## 2020-03-02 RX ADMIN — Medication 650 MILLIGRAM(S): at 11:58

## 2020-03-02 RX ADMIN — Medication 650 MILLIGRAM(S): at 17:23

## 2020-03-02 RX ADMIN — Medication 650 MILLIGRAM(S): at 12:53

## 2020-03-02 RX ADMIN — BUDESONIDE AND FORMOTEROL FUMARATE DIHYDRATE 2 PUFF(S): 160; 4.5 AEROSOL RESPIRATORY (INHALATION) at 17:24

## 2020-03-02 RX ADMIN — OXYCODONE AND ACETAMINOPHEN 2 TABLET(S): 5; 325 TABLET ORAL at 22:04

## 2020-03-02 RX ADMIN — OXYCODONE AND ACETAMINOPHEN 2 TABLET(S): 5; 325 TABLET ORAL at 15:40

## 2020-03-02 RX ADMIN — Medication 3 MILLILITER(S): at 05:34

## 2020-03-02 RX ADMIN — Medication 81 MILLIGRAM(S): at 12:48

## 2020-03-02 RX ADMIN — SERTRALINE 50 MILLIGRAM(S): 25 TABLET, FILM COATED ORAL at 22:04

## 2020-03-02 RX ADMIN — OXYCODONE AND ACETAMINOPHEN 2 TABLET(S): 5; 325 TABLET ORAL at 15:09

## 2020-03-02 RX ADMIN — NYSTATIN CREAM 1 APPLICATION(S): 100000 CREAM TOPICAL at 22:06

## 2020-03-02 RX ADMIN — INSULIN GLARGINE 75 UNIT(S): 100 INJECTION, SOLUTION SUBCUTANEOUS at 22:04

## 2020-03-02 RX ADMIN — RISPERIDONE 0.5 MILLIGRAM(S): 4 TABLET ORAL at 05:34

## 2020-03-02 RX ADMIN — OXYCODONE AND ACETAMINOPHEN 2 TABLET(S): 5; 325 TABLET ORAL at 22:34

## 2020-03-02 RX ADMIN — Medication 650 MILLIGRAM(S): at 19:23

## 2020-03-02 RX ADMIN — Medication 3 MILLILITER(S): at 17:29

## 2020-03-02 RX ADMIN — OXYCODONE AND ACETAMINOPHEN 2 TABLET(S): 5; 325 TABLET ORAL at 09:30

## 2020-03-02 RX ADMIN — RISPERIDONE 0.5 MILLIGRAM(S): 4 TABLET ORAL at 22:04

## 2020-03-02 RX ADMIN — BUDESONIDE AND FORMOTEROL FUMARATE DIHYDRATE 2 PUFF(S): 160; 4.5 AEROSOL RESPIRATORY (INHALATION) at 05:34

## 2020-03-02 RX ADMIN — Medication 1: at 08:44

## 2020-03-02 RX ADMIN — Medication 30 UNIT(S): at 12:49

## 2020-03-02 RX ADMIN — ATORVASTATIN CALCIUM 40 MILLIGRAM(S): 80 TABLET, FILM COATED ORAL at 22:04

## 2020-03-02 RX ADMIN — Medication 5 MILLIGRAM(S): at 22:04

## 2020-03-02 RX ADMIN — Medication 3 MILLILITER(S): at 11:58

## 2020-03-02 RX ADMIN — Medication 30 UNIT(S): at 08:44

## 2020-03-02 RX ADMIN — RISPERIDONE 0.5 MILLIGRAM(S): 4 TABLET ORAL at 13:16

## 2020-03-02 RX ADMIN — Medication 1: at 17:28

## 2020-03-02 RX ADMIN — ENOXAPARIN SODIUM 40 MILLIGRAM(S): 100 INJECTION SUBCUTANEOUS at 12:48

## 2020-03-02 NOTE — PROGRESS NOTE ADULT - SUBJECTIVE AND OBJECTIVE BOX
Chief complaint  Patient is a 59y old  Male who presents with a chief complaint of 59M w/ n/v/d and hyperglycemia sent in from [facility] (01 Mar 2020 20:49)   Review of systems  Patient in bed, looks comfortable, no hypoglycemia.    Labs and Fingersticks  CAPILLARY BLOOD GLUCOSE      POCT Blood Glucose.: 177 mg/dL (02 Mar 2020 12:35)  POCT Blood Glucose.: 177 mg/dL (02 Mar 2020 08:35)  POCT Blood Glucose.: 207 mg/dL (01 Mar 2020 21:33)  POCT Blood Glucose.: 109 mg/dL (01 Mar 2020 17:21)                        Medications  MEDICATIONS  (STANDING):  albuterol/ipratropium for Nebulization 3 milliLiter(s) Nebulizer every 6 hours  aspirin enteric coated 81 milliGRAM(s) Oral daily  atorvastatin 40 milliGRAM(s) Oral at bedtime  budesonide 160 MICROgram(s)/formoterol 4.5 MICROgram(s) Inhaler 2 Puff(s) Inhalation two times a day  dextrose 5%. 1000 milliLiter(s) (50 mL/Hr) IV Continuous <Continuous>  dextrose 50% Injectable 12.5 Gram(s) IV Push once  dextrose 50% Injectable 25 Gram(s) IV Push once  dextrose 50% Injectable 25 Gram(s) IV Push once  enoxaparin Injectable 40 milliGRAM(s) SubCutaneous daily  furosemide    Tablet 20 milliGRAM(s) Oral daily  insulin glargine Injectable (LANTUS) 75 Unit(s) SubCutaneous at bedtime  insulin lispro (HumaLOG) corrective regimen sliding scale   SubCutaneous three times a day before meals  insulin lispro (HumaLOG) corrective regimen sliding scale   SubCutaneous at bedtime  insulin lispro Injectable (HumaLOG) 30 Unit(s) SubCutaneous three times a day with meals  lisinopril 2.5 milliGRAM(s) Oral daily  melatonin 5 milliGRAM(s) Oral at bedtime  nicotine - Inhaler 1 Each Inhalation daily  nystatin Powder 1 Application(s) Topical two times a day  pantoprazole    Tablet 40 milliGRAM(s) Oral daily  polyethylene glycol 3350 17 Gram(s) Oral every 12 hours  risperiDONE   Tablet 0.5 milliGRAM(s) Oral three times a day  senna 2 Tablet(s) Oral at bedtime  sertraline 50 milliGRAM(s) Oral at bedtime      Physical Exam  General: Patient comfortable in bed  Vital Signs Last 12 Hrs  T(F): 97.2 (03-02-20 @ 09:11), Max: 97.5 (03-02-20 @ 05:31)  HR: 88 (03-02-20 @ 09:11) (70 - 88)  BP: 104/63 (03-02-20 @ 09:11) (96/63 - 104/63)  BP(mean): --  RR: 18 (03-02-20 @ 09:11) (17 - 18)  SpO2: 99% (03-02-20 @ 09:11) (97% - 99%)  Neck: No palpable thyroid nodules.  CVS: S1S2, No murmurs  Respiratory: No wheezing, no crepitations  GI: Abdomen soft, bowel sounds positive  Musculoskeletal:  edema lower extremities.   Skin: No skin rashes, no ecchymosis    Diagnostics Chief complaint  Patient is a 59y old  Male who presents with a  chief complaint of 59M w/ n/v/d and hyperglycemia sent in from [facility] (01 Mar 2020 20:49)   Review of systems  Patient in bed, looks comfortable, no hypoglycemia.    Labs and Fingersticks  CAPILLARY BLOOD GLUCOSE      POCT Blood Glucose.: 177 mg/dL (02 Mar 2020 12:35)  POCT Blood Glucose.: 177 mg/dL (02 Mar 2020 08:35)  POCT Blood Glucose.: 207 mg/dL (01 Mar 2020 21:33)  POCT Blood Glucose.: 109 mg/dL (01 Mar 2020 17:21)                        Medications  MEDICATIONS  (STANDING):  albuterol/ipratropium for Nebulization 3 milliLiter(s) Nebulizer every 6 hours  aspirin enteric coated 81 milliGRAM(s) Oral daily  atorvastatin 40 milliGRAM(s) Oral at bedtime  budesonide 160 MICROgram(s)/formoterol 4.5 MICROgram(s) Inhaler 2 Puff(s) Inhalation two times a day  dextrose 5%. 1000 milliLiter(s) (50 mL/Hr) IV Continuous <Continuous>  dextrose 50% Injectable 12.5 Gram(s) IV Push once  dextrose 50% Injectable 25 Gram(s) IV Push once  dextrose 50% Injectable 25 Gram(s) IV Push once  enoxaparin Injectable 40 milliGRAM(s) SubCutaneous daily  furosemide    Tablet 20 milliGRAM(s) Oral daily  insulin glargine Injectable (LANTUS) 75 Unit(s) SubCutaneous at bedtime  insulin lispro (HumaLOG) corrective regimen sliding scale   SubCutaneous three times a day before meals  insulin lispro (HumaLOG) corrective regimen sliding scale   SubCutaneous at bedtime  insulin lispro Injectable (HumaLOG) 30 Unit(s) SubCutaneous three times a day with meals  lisinopril 2.5 milliGRAM(s) Oral daily  melatonin 5 milliGRAM(s) Oral at bedtime  nicotine - Inhaler 1 Each Inhalation daily  nystatin Powder 1 Application(s) Topical two times a day  pantoprazole    Tablet 40 milliGRAM(s) Oral daily  polyethylene glycol 3350 17 Gram(s) Oral every 12 hours  risperiDONE   Tablet 0.5 milliGRAM(s) Oral three times a day  senna 2 Tablet(s) Oral at bedtime  sertraline 50 milliGRAM(s) Oral at bedtime      Physical Exam  General: Patient comfortable in bed  Vital Signs Last 12 Hrs  T(F): 97.2 (03-02-20 @ 09:11), Max: 97.5 (03-02-20 @ 05:31)  HR: 88 (03-02-20 @ 09:11) (70 - 88)  BP: 104/63 (03-02-20 @ 09:11) (96/63 - 104/63)  BP(mean): --  RR: 18 (03-02-20 @ 09:11) (17 - 18)  SpO2: 99% (03-02-20 @ 09:11) (97% - 99%)  Neck: No palpable thyroid nodules.  CVS: S1S2, No murmurs  Respiratory: No wheezing, no crepitations  GI: Abdomen soft, bowel sounds positive  Musculoskeletal:  edema lower extremities.   Skin: No skin rashes, no ecchymosis    Diagnostics

## 2020-03-02 NOTE — PROGRESS NOTE ADULT - ASSESSMENT
Assessment  DMT2: 59y Male with DM T2 with hyperglycemia, A1C 10.4%, on basal bolus insulin, FS improved and trending within overall acceptable range, no hypoglycemic episodes. Patient is eating full meals with good appetite, alert and comfortable, no acute events. Pending possible discharge to AL.  CAD: on medications, no chest pain, stable, monitored.  HTN: Controlled,  on antihypertensive medications.  Overweight/Obesity: No strict exercise routines, not on any weight loss program,  neither on low  calorie diet.          Laury Romero MD  Cell: [phone]  Office: [phone] Assessment  DMT2: 59y Male with DM T2 with hyperglycemia, A1C 10.4%, on basal bolus insulin, FS improved and trending within overall acceptable range, no hypoglycemic episodes. Patient is eating full meals  with good appetite, alert and comfortable, no acute events. Pending possible discharge to AL.  CAD: on medications, no chest pain, stable, monitored.  HTN: Controlled,  on antihypertensive medications.  Overweight/Obesity: No strict exercise routines, not on any weight loss program,  neither on low  calorie diet.          Laury Romero MD  Cell: [phone]  Office: [phone]

## 2020-03-02 NOTE — PROGRESS NOTE ADULT - SUBJECTIVE AND OBJECTIVE BOX
Patient is a 59y old  Male who presents with a chief complaint of 59M w/ n/v/d and hyperglycemia sent in from [facility] (02 Mar 2020 16:03)      INTERVAL HPI/OVERNIGHT EVENTS:  T(C): 36.7 (03-02-20 @ 20:20), Max: 36.7 (03-02-20 @ 20:20)  HR: 82 (03-02-20 @ 20:20) (70 - 88)  BP: 120/78 (03-02-20 @ 20:20) (96/63 - 120/78)  RR: 20 (03-02-20 @ 20:20) (17 - 20)  SpO2: 99% (03-02-20 @ 20:20) (97% - 99%)  Wt(kg): --  I&O's Summary    01 Mar 2020 07:01  -  02 Mar 2020 07:00  --------------------------------------------------------  IN: 480 mL / OUT: 1600 mL / NET: -1120 mL    02 Mar 2020 07:01  -  03 Mar 2020 01:37  --------------------------------------------------------  IN: 880 mL / OUT: 950 mL / NET: -70 mL        PAST MEDICAL & SURGICAL HISTORY:  Oxygen dependent  Gastroesophageal reflux disease, esophagitis presence not specified  Smoker  Bipolar 1 disorder  Coronary artery disease involving native coronary artery of native heart without angina pectoris  Type 2 diabetes mellitus without complication, with long-term current use of insulin  Pulmonary HTN  HLD (hyperlipidemia)  Stented coronary artery  COPD (chronic obstructive pulmonary disease)  No significant past surgical history      SOCIAL HISTORY  Alcohol:  Tobacco:  Illicit substance use:    FAMILY HISTORY:    REVIEW OF SYSTEMS:  CONSTITUTIONAL: No fever, weight loss, or fatigue  EYES: No eye pain, visual disturbances, or discharge  ENMT:  No difficulty hearing, tinnitus, vertigo; No sinus or throat pain  NECK: No pain or stiffness  RESPIRATORY: No cough, wheezing, chills or hemoptysis; No shortness of breath  CARDIOVASCULAR: No chest pain, palpitations, dizziness, or leg swelling  GASTROINTESTINAL: No abdominal or epigastric pain. No nausea, vomiting, or hematemesis; No diarrhea or constipation. No melena or hematochezia.  GENITOURINARY: No dysuria, frequency, hematuria, or incontinence  NEUROLOGICAL: No headaches, memory loss, loss of strength, numbness, or tremors  SKIN: No itching, burning, rashes, or lesions   LYMPH NODES: No enlarged glands  ENDOCRINE: No heat or cold intolerance; No hair loss  MUSCULOSKELETAL: No joint pain or swelling; No muscle, back, or extremity pain  PSYCHIATRIC: No depression, anxiety, mood swings, or difficulty sleeping  HEME/LYMPH: No easy bruising, or bleeding gums  ALLERY AND IMMUNOLOGIC: No hives or eczema    RADIOLOGY & ADDITIONAL TESTS:    Imaging Personally Reviewed:  [ ] YES  [ ] NO    Consultant(s) Notes Reviewed:  [ ] YES  [ ] NO    PHYSICAL EXAM:  GENERAL: NAD, well-groomed, well-developed  HEAD:  Atraumatic, Normocephalic  EYES: EOMI, PERRLA, conjunctiva and sclera clear  ENMT: No tonsillar erythema, exudates, or enlargement; Moist mucous membranes, Good dentition, No lesions  NECK: Supple, No JVD, Normal thyroid  NERVOUS SYSTEM:  Alert & Oriented X3, Good concentration; Motor Strength 5/5 B/L upper and lower extremities; DTRs 2+ intact and symmetric  CHEST/LUNG: Clear to percussion bilaterally; No rales, rhonchi, wheezing, or rubs  HEART: Regular rate and rhythm; No murmurs, rubs, or gallops  ABDOMEN: Soft, Nontender, Nondistended; Bowel sounds present  EXTREMITIES:  2+ Peripheral Pulses, No clubbing, cyanosis, or edema  LYMPH: No lymphadenopathy noted  SKIN: No rashes or lesions    LABS:              CAPILLARY BLOOD GLUCOSE      POCT Blood Glucose.: 194 mg/dL (02 Mar 2020 21:50)  POCT Blood Glucose.: 167 mg/dL (02 Mar 2020 17:25)  POCT Blood Glucose.: 177 mg/dL (02 Mar 2020 12:35)  POCT Blood Glucose.: 177 mg/dL (02 Mar 2020 08:35)            MEDICATIONS  (STANDING):  albuterol/ipratropium for Nebulization 3 milliLiter(s) Nebulizer every 6 hours  aspirin enteric coated 81 milliGRAM(s) Oral daily  atorvastatin 40 milliGRAM(s) Oral at bedtime  budesonide 160 MICROgram(s)/formoterol 4.5 MICROgram(s) Inhaler 2 Puff(s) Inhalation two times a day  dextrose 5%. 1000 milliLiter(s) (50 mL/Hr) IV Continuous <Continuous>  dextrose 50% Injectable 12.5 Gram(s) IV Push once  dextrose 50% Injectable 25 Gram(s) IV Push once  dextrose 50% Injectable 25 Gram(s) IV Push once  enoxaparin Injectable 40 milliGRAM(s) SubCutaneous daily  furosemide    Tablet 20 milliGRAM(s) Oral daily  insulin glargine Injectable (LANTUS) 75 Unit(s) SubCutaneous at bedtime  insulin lispro (HumaLOG) corrective regimen sliding scale   SubCutaneous three times a day before meals  insulin lispro (HumaLOG) corrective regimen sliding scale   SubCutaneous at bedtime  insulin lispro Injectable (HumaLOG) 30 Unit(s) SubCutaneous three times a day with meals  lisinopril 2.5 milliGRAM(s) Oral daily  melatonin 5 milliGRAM(s) Oral at bedtime  nicotine - Inhaler 1 Each Inhalation daily  nystatin Powder 1 Application(s) Topical two times a day  pantoprazole    Tablet 40 milliGRAM(s) Oral daily  polyethylene glycol 3350 17 Gram(s) Oral every 12 hours  risperiDONE   Tablet 0.5 milliGRAM(s) Oral three times a day  senna 2 Tablet(s) Oral at bedtime  sertraline 50 milliGRAM(s) Oral at bedtime    MEDICATIONS  (PRN):  acetaminophen   Tablet .. 650 milliGRAM(s) Oral every 6 hours PRN Mild Pain (1 - 3)  aluminum hydroxide/magnesium hydroxide/simethicone Suspension 30 milliLiter(s) Oral every 4 hours PRN Dyspepsia  bisacodyl 5 milliGRAM(s) Oral every 12 hours PRN Constipation  dextrose 40% Gel 15 Gram(s) Oral once PRN Blood Glucose LESS THAN 70 milliGRAM(s)/deciliter  glucagon  Injectable 1 milliGRAM(s) IntraMuscular once PRN Glucose LESS THAN 70 milligrams/deciliter  oxycodone    5 mG/acetaminophen 325 mG 2 Tablet(s) Oral every 6 hours PRN Moderate Pain (4 - 6)      Care Discussed with Consultants/Other Providers [ ] YES  [ ] NO

## 2020-03-03 LAB
GLUCOSE BLDC GLUCOMTR-MCNC: 151 MG/DL — HIGH (ref 70–99)
GLUCOSE BLDC GLUCOMTR-MCNC: 165 MG/DL — HIGH (ref 70–99)
GLUCOSE BLDC GLUCOMTR-MCNC: 183 MG/DL — HIGH (ref 70–99)
GLUCOSE BLDC GLUCOMTR-MCNC: 186 MG/DL — HIGH (ref 70–99)

## 2020-03-03 RX ADMIN — Medication 3 MILLILITER(S): at 17:14

## 2020-03-03 RX ADMIN — Medication 1: at 12:27

## 2020-03-03 RX ADMIN — RISPERIDONE 0.5 MILLIGRAM(S): 4 TABLET ORAL at 21:33

## 2020-03-03 RX ADMIN — Medication 3 MILLILITER(S): at 23:35

## 2020-03-03 RX ADMIN — OXYCODONE AND ACETAMINOPHEN 2 TABLET(S): 5; 325 TABLET ORAL at 08:44

## 2020-03-03 RX ADMIN — Medication 30 UNIT(S): at 12:27

## 2020-03-03 RX ADMIN — Medication 1: at 08:44

## 2020-03-03 RX ADMIN — Medication 5 MILLIGRAM(S): at 21:33

## 2020-03-03 RX ADMIN — Medication 30 UNIT(S): at 17:47

## 2020-03-03 RX ADMIN — Medication 650 MILLIGRAM(S): at 11:47

## 2020-03-03 RX ADMIN — Medication 1: at 17:47

## 2020-03-03 RX ADMIN — Medication 30 UNIT(S): at 08:44

## 2020-03-03 RX ADMIN — Medication 650 MILLIGRAM(S): at 19:42

## 2020-03-03 RX ADMIN — Medication 81 MILLIGRAM(S): at 11:47

## 2020-03-03 RX ADMIN — Medication 650 MILLIGRAM(S): at 20:30

## 2020-03-03 RX ADMIN — INSULIN GLARGINE 75 UNIT(S): 100 INJECTION, SOLUTION SUBCUTANEOUS at 21:33

## 2020-03-03 RX ADMIN — Medication 650 MILLIGRAM(S): at 04:08

## 2020-03-03 RX ADMIN — RISPERIDONE 0.5 MILLIGRAM(S): 4 TABLET ORAL at 05:39

## 2020-03-03 RX ADMIN — PANTOPRAZOLE SODIUM 40 MILLIGRAM(S): 20 TABLET, DELAYED RELEASE ORAL at 05:39

## 2020-03-03 RX ADMIN — Medication 650 MILLIGRAM(S): at 12:17

## 2020-03-03 RX ADMIN — Medication 650 MILLIGRAM(S): at 05:37

## 2020-03-03 RX ADMIN — BUDESONIDE AND FORMOTEROL FUMARATE DIHYDRATE 2 PUFF(S): 160; 4.5 AEROSOL RESPIRATORY (INHALATION) at 17:15

## 2020-03-03 RX ADMIN — SERTRALINE 50 MILLIGRAM(S): 25 TABLET, FILM COATED ORAL at 21:33

## 2020-03-03 RX ADMIN — NYSTATIN CREAM 1 APPLICATION(S): 100000 CREAM TOPICAL at 05:37

## 2020-03-03 RX ADMIN — LISINOPRIL 2.5 MILLIGRAM(S): 2.5 TABLET ORAL at 05:38

## 2020-03-03 RX ADMIN — OXYCODONE AND ACETAMINOPHEN 2 TABLET(S): 5; 325 TABLET ORAL at 17:14

## 2020-03-03 RX ADMIN — NYSTATIN CREAM 1 APPLICATION(S): 100000 CREAM TOPICAL at 21:34

## 2020-03-03 RX ADMIN — Medication 3 MILLILITER(S): at 11:48

## 2020-03-03 RX ADMIN — Medication 3 MILLILITER(S): at 05:39

## 2020-03-03 RX ADMIN — OXYCODONE AND ACETAMINOPHEN 2 TABLET(S): 5; 325 TABLET ORAL at 17:45

## 2020-03-03 RX ADMIN — OXYCODONE AND ACETAMINOPHEN 2 TABLET(S): 5; 325 TABLET ORAL at 09:15

## 2020-03-03 RX ADMIN — OXYCODONE AND ACETAMINOPHEN 2 TABLET(S): 5; 325 TABLET ORAL at 23:35

## 2020-03-03 RX ADMIN — BUDESONIDE AND FORMOTEROL FUMARATE DIHYDRATE 2 PUFF(S): 160; 4.5 AEROSOL RESPIRATORY (INHALATION) at 05:39

## 2020-03-03 RX ADMIN — RISPERIDONE 0.5 MILLIGRAM(S): 4 TABLET ORAL at 13:25

## 2020-03-03 RX ADMIN — ATORVASTATIN CALCIUM 40 MILLIGRAM(S): 80 TABLET, FILM COATED ORAL at 21:33

## 2020-03-03 RX ADMIN — ENOXAPARIN SODIUM 40 MILLIGRAM(S): 100 INJECTION SUBCUTANEOUS at 11:48

## 2020-03-03 NOTE — PROGRESS NOTE ADULT - SUBJECTIVE AND OBJECTIVE BOX
Patient is a 59y old  Male who presents with a chief complaint of 59M w/ n/v/d and hyperglycemia [facility] from [facility] (03 Mar 2020 14:10)    pt. seen and examined , no c/o  still waiting for placement    INTERVAL HPI/OVERNIGHT EVENTS:  T(C): 36.3 (03-03-20 @ 21:12), Max: 36.7 (03-03-20 @ 05:35)  HR: 81 (03-03-20 @ 21:12) (67 - 84)  BP: 115/76 (03-03-20 @ 21:12) (96/63 - 115/76)  RR: 20 (03-03-20 @ 21:12) (17 - 20)  SpO2: 95% (03-03-20 @ 21:12) (95% - 99%)  Wt(kg): --  I&O's Summary    02 Mar 2020 07:01  -  03 Mar 2020 07:00  --------------------------------------------------------  IN: 880 mL / OUT: 950 mL / NET: -70 mL    03 Mar 2020 07:01  -  03 Mar 2020 21:32  --------------------------------------------------------  IN: 1040 mL / OUT: 2000 mL / NET: -960 mL        PAST MEDICAL & SURGICAL HISTORY:  Oxygen dependent  Gastroesophageal reflux disease, esophagitis presence not specified  Smoker  Bipolar 1 disorder  Coronary artery disease involving native coronary artery of native heart without angina pectoris  Type 2 diabetes mellitus without complication, with long-term current use of insulin  Pulmonary HTN  HLD (hyperlipidemia)  Stented coronary artery  COPD (chronic obstructive pulmonary disease)  No significant past surgical history      SOCIAL HISTORY  Alcohol:  Tobacco:  Illicit substance use:    FAMILY HISTORY:    REVIEW OF SYSTEMS:  CONSTITUTIONAL: No fever, weight loss, or fatigue  EYES: No eye pain, visual disturbances, or discharge  ENMT:  No difficulty hearing, tinnitus, vertigo; No sinus or throat pain  NECK: No pain or stiffness  RESPIRATORY: No cough, wheezing, chills or hemoptysis; No shortness of breath  CARDIOVASCULAR: No chest pain, palpitations, dizziness, or leg swelling  GASTROINTESTINAL: No abdominal or epigastric pain. No nausea, vomiting, or hematemesis; No diarrhea or constipation. No melena or hematochezia.  GENITOURINARY: No dysuria, frequency, hematuria, or incontinence  NEUROLOGICAL: No headaches, memory loss, loss of strength, numbness, or tremors  SKIN: No itching, burning, rashes, or lesions   LYMPH NODES: No enlarged glands  ENDOCRINE: No heat or cold intolerance; No hair loss  MUSCULOSKELETAL: No joint pain or swelling; No muscle, back, or extremity pain  PSYCHIATRIC: No depression, anxiety, mood swings, or difficulty sleeping  HEME/LYMPH: No easy bruising, or bleeding gums  ALLERY AND IMMUNOLOGIC: No hives or eczema    RADIOLOGY & ADDITIONAL TESTS:    Imaging Personally Reviewed:  [ ] YES  [ ] NO    Consultant(s) Notes Reviewed:  [ ] YES  [ ] NO    PHYSICAL EXAM:  GENERAL: NAD, well-groomed, well-developed  HEAD:  Atraumatic, Normocephalic  EYES: EOMI, PERRLA, conjunctiva and sclera clear  ENMT: No tonsillar erythema, exudates, or enlargement; Moist mucous membranes, Good dentition, No lesions  NECK: Supple, No JVD, Normal thyroid  NERVOUS SYSTEM:  Alert & Oriented X3, Good concentration; Motor Strength 5/5 B/L upper and lower extremities; DTRs 2+ intact and symmetric  CHEST/LUNG: Clear to percussion bilaterally; No rales, rhonchi, wheezing, or rubs  HEART: Regular rate and rhythm; No murmurs, rubs, or gallops  ABDOMEN: Soft, Nontender, Nondistended; Bowel sounds present  EXTREMITIES:  2+ Peripheral Pulses, No clubbing, cyanosis, or edema  LYMPH: No lymphadenopathy noted  SKIN: No rashes or lesions    LABS:              CAPILLARY BLOOD GLUCOSE      POCT Blood Glucose.: 186 mg/dL (03 Mar 2020 21:29)  POCT Blood Glucose.: 151 mg/dL (03 Mar 2020 17:03)  POCT Blood Glucose.: 165 mg/dL (03 Mar 2020 12:17)  POCT Blood Glucose.: 183 mg/dL (03 Mar 2020 08:37)  POCT Blood Glucose.: 194 mg/dL (02 Mar 2020 21:50)            MEDICATIONS  (STANDING):  albuterol/ipratropium for Nebulization 3 milliLiter(s) Nebulizer every 6 hours  aspirin enteric coated 81 milliGRAM(s) Oral daily  atorvastatin 40 milliGRAM(s) Oral at bedtime  budesonide 160 MICROgram(s)/formoterol 4.5 MICROgram(s) Inhaler 2 Puff(s) Inhalation two times a day  dextrose 5%. 1000 milliLiter(s) (50 mL/Hr) IV Continuous <Continuous>  dextrose 50% Injectable 12.5 Gram(s) IV Push once  dextrose 50% Injectable 25 Gram(s) IV Push once  dextrose 50% Injectable 25 Gram(s) IV Push once  enoxaparin Injectable 40 milliGRAM(s) SubCutaneous daily  furosemide    Tablet 20 milliGRAM(s) Oral daily  insulin glargine Injectable (LANTUS) 75 Unit(s) SubCutaneous at bedtime  insulin lispro (HumaLOG) corrective regimen sliding scale   SubCutaneous three times a day before meals  insulin lispro (HumaLOG) corrective regimen sliding scale   SubCutaneous at bedtime  insulin lispro Injectable (HumaLOG) 30 Unit(s) SubCutaneous three times a day with meals  lisinopril 2.5 milliGRAM(s) Oral daily  melatonin 5 milliGRAM(s) Oral at bedtime  nicotine - Inhaler 1 Each Inhalation daily  nystatin Powder 1 Application(s) Topical two times a day  pantoprazole    Tablet 40 milliGRAM(s) Oral daily  polyethylene glycol 3350 17 Gram(s) Oral every 12 hours  risperiDONE   Tablet 0.5 milliGRAM(s) Oral three times a day  senna 2 Tablet(s) Oral at bedtime  sertraline 50 milliGRAM(s) Oral at bedtime    MEDICATIONS  (PRN):  acetaminophen   Tablet .. 650 milliGRAM(s) Oral every 6 hours PRN Mild Pain (1 - 3)  aluminum hydroxide/magnesium hydroxide/simethicone Suspension 30 milliLiter(s) Oral every 4 hours PRN Dyspepsia  bisacodyl 5 milliGRAM(s) Oral every 12 hours PRN Constipation  dextrose 40% Gel 15 Gram(s) Oral once PRN Blood Glucose LESS THAN 70 milliGRAM(s)/deciliter  glucagon  Injectable 1 milliGRAM(s) IntraMuscular once PRN Glucose LESS THAN 70 milligrams/deciliter  oxycodone    5 mG/acetaminophen 325 mG 2 Tablet(s) Oral every 6 hours PRN Moderate Pain (4 - 6)      Care Discussed with Consultants/Other Providers [ ] YES  [ ] NO

## 2020-03-03 NOTE — PROGRESS NOTE ADULT - SUBJECTIVE AND OBJECTIVE BOX
Chief complaint  Patient is a 59y old  Male who presents with a chief complaint of 59M w/ n/v/d and hyperglycemia sent in from [facility] (02 Mar 2020 20:36)   Review of systems  Patient in bed, looks comfortable, no hypoglycemia.    Labs and Fingersticks  CAPILLARY BLOOD GLUCOSE      POCT Blood Glucose.: 165 mg/dL (03 Mar 2020 12:17)  POCT Blood Glucose.: 183 mg/dL (03 Mar 2020 08:37)  POCT Blood Glucose.: 194 mg/dL (02 Mar 2020 21:50)  POCT Blood Glucose.: 167 mg/dL (02 Mar 2020 17:25)                        Medications  MEDICATIONS  (STANDING):  albuterol/ipratropium for Nebulization 3 milliLiter(s) Nebulizer every 6 hours  aspirin enteric coated 81 milliGRAM(s) Oral daily  atorvastatin 40 milliGRAM(s) Oral at bedtime  budesonide 160 MICROgram(s)/formoterol 4.5 MICROgram(s) Inhaler 2 Puff(s) Inhalation two times a day  dextrose 5%. 1000 milliLiter(s) (50 mL/Hr) IV Continuous <Continuous>  dextrose 50% Injectable 12.5 Gram(s) IV Push once  dextrose 50% Injectable 25 Gram(s) IV Push once  dextrose 50% Injectable 25 Gram(s) IV Push once  enoxaparin Injectable 40 milliGRAM(s) SubCutaneous daily  furosemide    Tablet 20 milliGRAM(s) Oral daily  insulin glargine Injectable (LANTUS) 75 Unit(s) SubCutaneous at bedtime  insulin lispro (HumaLOG) corrective regimen sliding scale   SubCutaneous three times a day before meals  insulin lispro (HumaLOG) corrective regimen sliding scale   SubCutaneous at bedtime  insulin lispro Injectable (HumaLOG) 30 Unit(s) SubCutaneous three times a day with meals  lisinopril 2.5 milliGRAM(s) Oral daily  melatonin 5 milliGRAM(s) Oral at bedtime  nicotine - Inhaler 1 Each Inhalation daily  nystatin Powder 1 Application(s) Topical two times a day  pantoprazole    Tablet 40 milliGRAM(s) Oral daily  polyethylene glycol 3350 17 Gram(s) Oral every 12 hours  risperiDONE   Tablet 0.5 milliGRAM(s) Oral three times a day  senna 2 Tablet(s) Oral at bedtime  sertraline 50 milliGRAM(s) Oral at bedtime      Physical Exam  General: Patient comfortable in bed  Vital Signs Last 12 Hrs  T(F): 98 (03-03-20 @ 08:14), Max: 98 (03-03-20 @ 05:35)  HR: 84 (03-03-20 @ 08:14) (67 - 84)  BP: 106/67 (03-03-20 @ 08:14) (96/63 - 106/67)  BP(mean): --  RR: 20 (03-03-20 @ 08:14) (17 - 20)  SpO2: 95% (03-03-20 @ 08:14) (95% - 99%)  Neck: No palpable thyroid nodules.  CVS: S1S2, No murmurs  Respiratory: No wheezing, no crepitations  GI: Abdomen soft, bowel sounds positive  Musculoskeletal:  edema lower extremities.   Skin: No skin rashes, no ecchymosis    Diagnostics Chief complaint  Patient is a 59y old  Male who presents with a chief complaint of  59M w/ n/v/d and hyperglycemia sent in from [facility] (02 Mar 2020 20:36)   Review of systems  Patient in bed, looks comfortable, no hypoglycemia.    Labs and Fingersticks  CAPILLARY BLOOD GLUCOSE      POCT Blood Glucose.: 165 mg/dL (03 Mar 2020 12:17)  POCT Blood Glucose.: 183 mg/dL (03 Mar 2020 08:37)  POCT Blood Glucose.: 194 mg/dL (02 Mar 2020 21:50)  POCT Blood Glucose.: 167 mg/dL (02 Mar 2020 17:25)                        Medications  MEDICATIONS  (STANDING):  albuterol/ipratropium for Nebulization 3 milliLiter(s) Nebulizer every 6 hours  aspirin enteric coated 81 milliGRAM(s) Oral daily  atorvastatin 40 milliGRAM(s) Oral at bedtime  budesonide 160 MICROgram(s)/formoterol 4.5 MICROgram(s) Inhaler 2 Puff(s) Inhalation two times a day  dextrose 5%. 1000 milliLiter(s) (50 mL/Hr) IV Continuous <Continuous>  dextrose 50% Injectable 12.5 Gram(s) IV Push once  dextrose 50% Injectable 25 Gram(s) IV Push once  dextrose 50% Injectable 25 Gram(s) IV Push once  enoxaparin Injectable 40 milliGRAM(s) SubCutaneous daily  furosemide    Tablet 20 milliGRAM(s) Oral daily  insulin glargine Injectable (LANTUS) 75 Unit(s) SubCutaneous at bedtime  insulin lispro (HumaLOG) corrective regimen sliding scale   SubCutaneous three times a day before meals  insulin lispro (HumaLOG) corrective regimen sliding scale   SubCutaneous at bedtime  insulin lispro Injectable (HumaLOG) 30 Unit(s) SubCutaneous three times a day with meals  lisinopril 2.5 milliGRAM(s) Oral daily  melatonin 5 milliGRAM(s) Oral at bedtime  nicotine - Inhaler 1 Each Inhalation daily  nystatin Powder 1 Application(s) Topical two times a day  pantoprazole    Tablet 40 milliGRAM(s) Oral daily  polyethylene glycol 3350 17 Gram(s) Oral every 12 hours  risperiDONE   Tablet 0.5 milliGRAM(s) Oral three times a day  senna 2 Tablet(s) Oral at bedtime  sertraline 50 milliGRAM(s) Oral at bedtime      Physical Exam  General: Patient comfortable in bed  Vital Signs Last 12 Hrs  T(F): 98 (03-03-20 @ 08:14), Max: 98 (03-03-20 @ 05:35)  HR: 84 (03-03-20 @ 08:14) (67 - 84)  BP: 106/67 (03-03-20 @ 08:14) (96/63 - 106/67)  BP(mean): --  RR: 20 (03-03-20 @ 08:14) (17 - 20)  SpO2: 95% (03-03-20 @ 08:14) (95% - 99%)  Neck: No palpable thyroid nodules.  CVS: S1S2, No murmurs  Respiratory: No wheezing, no crepitations  GI: Abdomen soft, bowel sounds positive  Musculoskeletal:  edema lower extremities.   Skin: No skin rashes, no ecchymosis    Diagnostics

## 2020-03-03 NOTE — PROGRESS NOTE ADULT - ASSESSMENT
Assessment  DMT2: 59y Male with DM T2 with hyperglycemia, A1C 10.4%, on basal bolus insulin, FS improved and trending within acceptable range, no hypoglycemic episodes. Patient is eating full meals with good appetite, alert and comfortable, no acute events. Possible discharge to AL; Pending final decision/acceptance.  CAD: on medications, no chest pain, stable, monitored.  HTN: Controlled,  on antihypertensive medications.  Overweight/Obesity: No strict exercise routines, not on any weight loss program,  neither on low  calorie diet.          Laury Romero MD  Cell: [phone]  Office: [phone]

## 2020-03-04 LAB
GLUCOSE BLDC GLUCOMTR-MCNC: 151 MG/DL — HIGH (ref 70–99)
GLUCOSE BLDC GLUCOMTR-MCNC: 205 MG/DL — HIGH (ref 70–99)
GLUCOSE BLDC GLUCOMTR-MCNC: 210 MG/DL — HIGH (ref 70–99)
GLUCOSE BLDC GLUCOMTR-MCNC: 215 MG/DL — HIGH (ref 70–99)

## 2020-03-04 RX ORDER — OXYCODONE AND ACETAMINOPHEN 5; 325 MG/1; MG/1
2 TABLET ORAL EVERY 6 HOURS
Refills: 0 | Status: DISCONTINUED | OUTPATIENT
Start: 2020-03-05 | End: 2020-03-12

## 2020-03-04 RX ADMIN — Medication 1: at 12:49

## 2020-03-04 RX ADMIN — ATORVASTATIN CALCIUM 40 MILLIGRAM(S): 80 TABLET, FILM COATED ORAL at 21:09

## 2020-03-04 RX ADMIN — OXYCODONE AND ACETAMINOPHEN 2 TABLET(S): 5; 325 TABLET ORAL at 00:25

## 2020-03-04 RX ADMIN — NYSTATIN CREAM 1 APPLICATION(S): 100000 CREAM TOPICAL at 21:10

## 2020-03-04 RX ADMIN — OXYCODONE AND ACETAMINOPHEN 2 TABLET(S): 5; 325 TABLET ORAL at 08:37

## 2020-03-04 RX ADMIN — OXYCODONE AND ACETAMINOPHEN 2 TABLET(S): 5; 325 TABLET ORAL at 09:10

## 2020-03-04 RX ADMIN — PANTOPRAZOLE SODIUM 40 MILLIGRAM(S): 20 TABLET, DELAYED RELEASE ORAL at 05:47

## 2020-03-04 RX ADMIN — Medication 650 MILLIGRAM(S): at 11:29

## 2020-03-04 RX ADMIN — Medication 30 UNIT(S): at 12:48

## 2020-03-04 RX ADMIN — Medication 3 MILLILITER(S): at 17:01

## 2020-03-04 RX ADMIN — Medication 81 MILLIGRAM(S): at 11:28

## 2020-03-04 RX ADMIN — Medication 2: at 17:14

## 2020-03-04 RX ADMIN — BUDESONIDE AND FORMOTEROL FUMARATE DIHYDRATE 2 PUFF(S): 160; 4.5 AEROSOL RESPIRATORY (INHALATION) at 17:01

## 2020-03-04 RX ADMIN — Medication 5 MILLIGRAM(S): at 21:10

## 2020-03-04 RX ADMIN — RISPERIDONE 0.5 MILLIGRAM(S): 4 TABLET ORAL at 05:47

## 2020-03-04 RX ADMIN — Medication 650 MILLIGRAM(S): at 20:00

## 2020-03-04 RX ADMIN — NYSTATIN CREAM 1 APPLICATION(S): 100000 CREAM TOPICAL at 05:47

## 2020-03-04 RX ADMIN — Medication 650 MILLIGRAM(S): at 12:00

## 2020-03-04 RX ADMIN — INSULIN GLARGINE 75 UNIT(S): 100 INJECTION, SOLUTION SUBCUTANEOUS at 21:50

## 2020-03-04 RX ADMIN — SENNA PLUS 2 TABLET(S): 8.6 TABLET ORAL at 21:10

## 2020-03-04 RX ADMIN — BUDESONIDE AND FORMOTEROL FUMARATE DIHYDRATE 2 PUFF(S): 160; 4.5 AEROSOL RESPIRATORY (INHALATION) at 05:47

## 2020-03-04 RX ADMIN — RISPERIDONE 0.5 MILLIGRAM(S): 4 TABLET ORAL at 21:10

## 2020-03-04 RX ADMIN — Medication 0: at 21:50

## 2020-03-04 RX ADMIN — LISINOPRIL 2.5 MILLIGRAM(S): 2.5 TABLET ORAL at 05:46

## 2020-03-04 RX ADMIN — Medication 650 MILLIGRAM(S): at 06:30

## 2020-03-04 RX ADMIN — RISPERIDONE 0.5 MILLIGRAM(S): 4 TABLET ORAL at 13:15

## 2020-03-04 RX ADMIN — SERTRALINE 50 MILLIGRAM(S): 25 TABLET, FILM COATED ORAL at 21:10

## 2020-03-04 RX ADMIN — Medication 30 UNIT(S): at 08:28

## 2020-03-04 RX ADMIN — Medication 3 MILLILITER(S): at 05:47

## 2020-03-04 RX ADMIN — Medication 20 MILLIGRAM(S): at 05:47

## 2020-03-04 RX ADMIN — Medication 2: at 08:28

## 2020-03-04 RX ADMIN — ENOXAPARIN SODIUM 40 MILLIGRAM(S): 100 INJECTION SUBCUTANEOUS at 11:28

## 2020-03-04 RX ADMIN — Medication 30 UNIT(S): at 17:13

## 2020-03-04 RX ADMIN — Medication 650 MILLIGRAM(S): at 05:45

## 2020-03-04 RX ADMIN — Medication 650 MILLIGRAM(S): at 19:27

## 2020-03-04 RX ADMIN — OXYCODONE AND ACETAMINOPHEN 2 TABLET(S): 5; 325 TABLET ORAL at 17:00

## 2020-03-04 RX ADMIN — Medication 3 MILLILITER(S): at 11:28

## 2020-03-04 RX ADMIN — OXYCODONE AND ACETAMINOPHEN 2 TABLET(S): 5; 325 TABLET ORAL at 17:30

## 2020-03-04 NOTE — PROGRESS NOTE ADULT - ASSESSMENT
Assessment  DMT2: 59y Male with DM T2 with hyperglycemia, A1C 10.4%, on basal bolus insulin, FS trending within overall acceptable range, no hypoglycemic episodes. Patient is eating full meals with good appetite, alert and comfortable, no complaints, no acute events.  CAD: on medications, no chest pain, stable, monitored.  HTN: Controlled,  on antihypertensive medications.  Overweight/Obesity: No strict exercise routines, not on any weight loss program,  neither on low  calorie diet.          Laury Romero MD  Cell: [phone]  Office: [phone] Assessment  DMT2: 59y Male with DM T2 with hyperglycemia, A1C 10.4%, on basal bolus insulin, FS trending within  overall acceptable range, no hypoglycemic episodes. Patient is eating full meals with good appetite, alert and comfortable, no complaints, no acute events.  CAD: on medications, no chest pain, stable, monitored.  HTN: Controlled,  on antihypertensive medications.  Overweight/Obesity: No strict exercise routines, not on any weight loss program,  neither on low  calorie diet.          Laury Romero MD  Cell: [phone]  Office: [phone]

## 2020-03-04 NOTE — PROGRESS NOTE ADULT - SUBJECTIVE AND OBJECTIVE BOX
Chief complaint  Patient is a 59y old  Male who presents with a chief complaint of 59M w/ n/v/d and hyperglycemia sent in from [facility] (03 Mar 2020 21:32)   Review of systems  Patient in bed, looks comfortable, no hypoglycemia.    Labs and Fingersticks  CAPILLARY BLOOD GLUCOSE      POCT Blood Glucose.: 151 mg/dL (04 Mar 2020 12:22)  POCT Blood Glucose.: 205 mg/dL (04 Mar 2020 08:21)  POCT Blood Glucose.: 186 mg/dL (03 Mar 2020 21:29)  POCT Blood Glucose.: 151 mg/dL (03 Mar 2020 17:03)                        Medications  MEDICATIONS  (STANDING):  albuterol/ipratropium for Nebulization 3 milliLiter(s) Nebulizer every 6 hours  aspirin enteric coated 81 milliGRAM(s) Oral daily  atorvastatin 40 milliGRAM(s) Oral at bedtime  budesonide 160 MICROgram(s)/formoterol 4.5 MICROgram(s) Inhaler 2 Puff(s) Inhalation two times a day  dextrose 5%. 1000 milliLiter(s) (50 mL/Hr) IV Continuous <Continuous>  dextrose 50% Injectable 12.5 Gram(s) IV Push once  dextrose 50% Injectable 25 Gram(s) IV Push once  dextrose 50% Injectable 25 Gram(s) IV Push once  enoxaparin Injectable 40 milliGRAM(s) SubCutaneous daily  furosemide    Tablet 20 milliGRAM(s) Oral daily  insulin glargine Injectable (LANTUS) 75 Unit(s) SubCutaneous at bedtime  insulin lispro (HumaLOG) corrective regimen sliding scale   SubCutaneous three times a day before meals  insulin lispro (HumaLOG) corrective regimen sliding scale   SubCutaneous at bedtime  insulin lispro Injectable (HumaLOG) 30 Unit(s) SubCutaneous three times a day with meals  lisinopril 2.5 milliGRAM(s) Oral daily  melatonin 5 milliGRAM(s) Oral at bedtime  nicotine - Inhaler 1 Each Inhalation daily  nystatin Powder 1 Application(s) Topical two times a day  pantoprazole    Tablet 40 milliGRAM(s) Oral daily  polyethylene glycol 3350 17 Gram(s) Oral every 12 hours  risperiDONE   Tablet 0.5 milliGRAM(s) Oral three times a day  senna 2 Tablet(s) Oral at bedtime  sertraline 50 milliGRAM(s) Oral at bedtime      Physical Exam  General: Patient comfortable in bed  Vital Signs Last 12 Hrs  T(F): 98.5 (03-04-20 @ 14:28), Max: 98.5 (03-04-20 @ 14:28)  HR: 72 (03-04-20 @ 14:28) (72 - 86)  BP: 104/66 (03-04-20 @ 14:28) (104/66 - 108/74)  BP(mean): --  RR: 18 (03-04-20 @ 14:28) (18 - 18)  SpO2: 94% (03-04-20 @ 14:28) (94% - 97%)  Neck: No palpable thyroid nodules.  CVS: S1S2, No murmurs  Respiratory: No wheezing, no crepitations  GI: Abdomen soft, bowel sounds positive  Musculoskeletal:  edema lower extremities.   Skin: No skin rashes, no ecchymosis    Diagnostics Chief complaint  Patient is a 59y old  Male who presents with a chief complaint of 59M w/ n/v/d and hyperglycemia sent in from [facility] (03 Mar 2020 21:32)   Review of systems  Patient in bed, looks comfortable,  no hypoglycemia.    Labs and Fingersticks  CAPILLARY BLOOD GLUCOSE      POCT Blood Glucose.: 151 mg/dL (04 Mar 2020 12:22)  POCT Blood Glucose.: 205 mg/dL (04 Mar 2020 08:21)  POCT Blood Glucose.: 186 mg/dL (03 Mar 2020 21:29)  POCT Blood Glucose.: 151 mg/dL (03 Mar 2020 17:03)    Medications  MEDICATIONS  (STANDING):  albuterol/ipratropium for Nebulization 3 milliLiter(s) Nebulizer every 6 hours  aspirin enteric coated 81 milliGRAM(s) Oral daily  atorvastatin 40 milliGRAM(s) Oral at bedtime  budesonide 160 MICROgram(s)/formoterol 4.5 MICROgram(s) Inhaler 2 Puff(s) Inhalation two times a day  dextrose 5%. 1000 milliLiter(s) (50 mL/Hr) IV Continuous <Continuous>  dextrose 50% Injectable 12.5 Gram(s) IV Push once  dextrose 50% Injectable 25 Gram(s) IV Push once  dextrose 50% Injectable 25 Gram(s) IV Push once  enoxaparin Injectable 40 milliGRAM(s) SubCutaneous daily  furosemide    Tablet 20 milliGRAM(s) Oral daily  insulin glargine Injectable (LANTUS) 75 Unit(s) SubCutaneous at bedtime  insulin lispro (HumaLOG) corrective regimen sliding scale   SubCutaneous three times a day before meals  insulin lispro (HumaLOG) corrective regimen sliding scale   SubCutaneous at bedtime  insulin lispro Injectable (HumaLOG) 30 Unit(s) SubCutaneous three times a day with meals  lisinopril 2.5 milliGRAM(s) Oral daily  melatonin 5 milliGRAM(s) Oral at bedtime  nicotine - Inhaler 1 Each Inhalation daily  nystatin Powder 1 Application(s) Topical two times a day  pantoprazole    Tablet 40 milliGRAM(s) Oral daily  polyethylene glycol 3350 17 Gram(s) Oral every 12 hours  risperiDONE   Tablet 0.5 milliGRAM(s) Oral three times a day  senna 2 Tablet(s) Oral at bedtime  sertraline 50 milliGRAM(s) Oral at bedtime      Physical Exam  General: Patient comfortable in bed  Vital Signs Last 12 Hrs  T(F): 98.5 (03-04-20 @ 14:28), Max: 98.5 (03-04-20 @ 14:28)  HR: 72 (03-04-20 @ 14:28) (72 - 86)  BP: 104/66 (03-04-20 @ 14:28) (104/66 - 108/74)  BP(mean): --  RR: 18 (03-04-20 @ 14:28) (18 - 18)  SpO2: 94% (03-04-20 @ 14:28) (94% - 97%)  Neck: No palpable thyroid nodules.  CVS: S1S2, No murmurs  Respiratory: No wheezing, no crepitations  GI: Abdomen soft, bowel sounds positive  Musculoskeletal:  edema lower extremities.   Skin: No skin rashes, no ecchymosis    Diagnostics

## 2020-03-04 NOTE — PROGRESS NOTE ADULT - SUBJECTIVE AND OBJECTIVE BOX
Patient is a 59y old  Male who presents with a chief complaint of 59M w/ n/v/d and hyperglycemia sent in from [facility] (04 Mar 2020 15:49)    pt. seen and examined , no c/o    INTERVAL HPI/OVERNIGHT EVENTS:  T(C): 36.5 (03-04-20 @ 20:41), Max: 36.9 (03-04-20 @ 14:28)  HR: 82 (03-04-20 @ 20:41) (72 - 86)  BP: 111/74 (03-04-20 @ 20:41) (104/66 - 111/74)  RR: 18 (03-04-20 @ 20:41) (18 - 18)  SpO2: 97% (03-04-20 @ 20:41) (94% - 97%)  Wt(kg): --  I&O's Summary    03 Mar 2020 07:01  -  04 Mar 2020 07:00  --------------------------------------------------------  IN: 1240 mL / OUT: 2600 mL / NET: -1360 mL    04 Mar 2020 07:01  -  05 Mar 2020 00:01  --------------------------------------------------------  IN: 1240 mL / OUT: 1500 mL / NET: -260 mL        PAST MEDICAL & SURGICAL HISTORY:  Oxygen dependent  Gastroesophageal reflux disease, esophagitis presence not specified  Smoker  Bipolar 1 disorder  Coronary artery disease involving native coronary artery of native heart without angina pectoris  Type 2 diabetes mellitus without complication, with long-term current use of insulin  Pulmonary HTN  HLD (hyperlipidemia)  Stented coronary artery  COPD (chronic obstructive pulmonary disease)  No significant past surgical history      SOCIAL HISTORY  Alcohol:  Tobacco:  Illicit substance use:    FAMILY HISTORY:    REVIEW OF SYSTEMS:  CONSTITUTIONAL: No fever, weight loss, or fatigue  EYES: No eye pain, visual disturbances, or discharge  ENMT:  No difficulty hearing, tinnitus, vertigo; No sinus or throat pain  NECK: No pain or stiffness  RESPIRATORY: No cough, wheezing, chills or hemoptysis; No shortness of breath  CARDIOVASCULAR: No chest pain, palpitations, dizziness, or leg swelling  GASTROINTESTINAL: No abdominal or epigastric pain. No nausea, vomiting, or hematemesis; No diarrhea or constipation. No melena or hematochezia.  GENITOURINARY: No dysuria, frequency, hematuria, or incontinence  NEUROLOGICAL: No headaches, memory loss, loss of strength, numbness, or tremors  SKIN: No itching, burning, rashes, or lesions   LYMPH NODES: No enlarged glands  ENDOCRINE: No heat or cold intolerance; No hair loss  MUSCULOSKELETAL: No joint pain or swelling; No muscle, back, or extremity pain  PSYCHIATRIC: No depression, anxiety, mood swings, or difficulty sleeping  HEME/LYMPH: No easy bruising, or bleeding gums  ALLERY AND IMMUNOLOGIC: No hives or eczema    RADIOLOGY & ADDITIONAL TESTS:    Imaging Personally Reviewed:  [ ] YES  [ ] NO    Consultant(s) Notes Reviewed:  [ ] YES  [ ] NO    PHYSICAL EXAM:  GENERAL: NAD, well-groomed, well-developed  HEAD:  Atraumatic, Normocephalic  EYES: EOMI, PERRLA, conjunctiva and sclera clear  ENMT: No tonsillar erythema, exudates, or enlargement; Moist mucous membranes, Good dentition, No lesions  NECK: Supple, No JVD, Normal thyroid  NERVOUS SYSTEM:  Alert & Oriented X3, Good concentration; Motor Strength 5/5 B/L upper and lower extremities; DTRs 2+ intact and symmetric  CHEST/LUNG: Clear to percussion bilaterally; No rales, rhonchi, wheezing, or rubs  HEART: Regular rate and rhythm; No murmurs, rubs, or gallops  ABDOMEN: Soft, Nontender, Nondistended; Bowel sounds present  EXTREMITIES:  2+ Peripheral Pulses, No clubbing, cyanosis, or edema  LYMPH: No lymphadenopathy noted  SKIN: No rashes or lesions    LABS:              CAPILLARY BLOOD GLUCOSE      POCT Blood Glucose.: 210 mg/dL (04 Mar 2020 21:42)  POCT Blood Glucose.: 215 mg/dL (04 Mar 2020 17:08)  POCT Blood Glucose.: 151 mg/dL (04 Mar 2020 12:22)  POCT Blood Glucose.: 205 mg/dL (04 Mar 2020 08:21)            MEDICATIONS  (STANDING):  albuterol/ipratropium for Nebulization 3 milliLiter(s) Nebulizer every 6 hours  aspirin enteric coated 81 milliGRAM(s) Oral daily  atorvastatin 40 milliGRAM(s) Oral at bedtime  budesonide 160 MICROgram(s)/formoterol 4.5 MICROgram(s) Inhaler 2 Puff(s) Inhalation two times a day  dextrose 5%. 1000 milliLiter(s) (50 mL/Hr) IV Continuous <Continuous>  dextrose 50% Injectable 12.5 Gram(s) IV Push once  dextrose 50% Injectable 25 Gram(s) IV Push once  dextrose 50% Injectable 25 Gram(s) IV Push once  enoxaparin Injectable 40 milliGRAM(s) SubCutaneous daily  furosemide    Tablet 20 milliGRAM(s) Oral daily  insulin glargine Injectable (LANTUS) 75 Unit(s) SubCutaneous at bedtime  insulin lispro (HumaLOG) corrective regimen sliding scale   SubCutaneous three times a day before meals  insulin lispro (HumaLOG) corrective regimen sliding scale   SubCutaneous at bedtime  insulin lispro Injectable (HumaLOG) 30 Unit(s) SubCutaneous three times a day with meals  lisinopril 2.5 milliGRAM(s) Oral daily  melatonin 5 milliGRAM(s) Oral at bedtime  nicotine - Inhaler 1 Each Inhalation daily  nystatin Powder 1 Application(s) Topical two times a day  pantoprazole    Tablet 40 milliGRAM(s) Oral daily  polyethylene glycol 3350 17 Gram(s) Oral every 12 hours  risperiDONE   Tablet 0.5 milliGRAM(s) Oral three times a day  senna 2 Tablet(s) Oral at bedtime  sertraline 50 milliGRAM(s) Oral at bedtime    MEDICATIONS  (PRN):  acetaminophen   Tablet .. 650 milliGRAM(s) Oral every 6 hours PRN Mild Pain (1 - 3)  aluminum hydroxide/magnesium hydroxide/simethicone Suspension 30 milliLiter(s) Oral every 4 hours PRN Dyspepsia  bisacodyl 5 milliGRAM(s) Oral every 12 hours PRN Constipation  dextrose 40% Gel 15 Gram(s) Oral once PRN Blood Glucose LESS THAN 70 milliGRAM(s)/deciliter  glucagon  Injectable 1 milliGRAM(s) IntraMuscular once PRN Glucose LESS THAN 70 milligrams/deciliter  oxycodone    5 mG/acetaminophen 325 mG 2 Tablet(s) Oral every 6 hours PRN Moderate Pain (4 - 6)      Care Discussed with Consultants/Other Providers [ ] YES  [ ] NO

## 2020-03-05 LAB
ANION GAP SERPL CALC-SCNC: 17 MMOL/L — SIGNIFICANT CHANGE UP (ref 5–17)
BUN SERPL-MCNC: 23 MG/DL — SIGNIFICANT CHANGE UP (ref 7–23)
CALCIUM SERPL-MCNC: 9.3 MG/DL — SIGNIFICANT CHANGE UP (ref 8.4–10.5)
CHLORIDE SERPL-SCNC: 94 MMOL/L — LOW (ref 96–108)
CO2 SERPL-SCNC: 26 MMOL/L — SIGNIFICANT CHANGE UP (ref 22–31)
CREAT SERPL-MCNC: 0.73 MG/DL — SIGNIFICANT CHANGE UP (ref 0.5–1.3)
GLUCOSE BLDC GLUCOMTR-MCNC: 116 MG/DL — HIGH (ref 70–99)
GLUCOSE BLDC GLUCOMTR-MCNC: 155 MG/DL — HIGH (ref 70–99)
GLUCOSE BLDC GLUCOMTR-MCNC: 181 MG/DL — HIGH (ref 70–99)
GLUCOSE BLDC GLUCOMTR-MCNC: 200 MG/DL — HIGH (ref 70–99)
GLUCOSE SERPL-MCNC: 163 MG/DL — HIGH (ref 70–99)
HCT VFR BLD CALC: 37.3 % — LOW (ref 39–50)
HGB BLD-MCNC: 11.9 G/DL — LOW (ref 13–17)
MCHC RBC-ENTMCNC: 28.8 PG — SIGNIFICANT CHANGE UP (ref 27–34)
MCHC RBC-ENTMCNC: 31.9 GM/DL — LOW (ref 32–36)
MCV RBC AUTO: 90.3 FL — SIGNIFICANT CHANGE UP (ref 80–100)
NRBC # BLD: 0 /100 WBCS — SIGNIFICANT CHANGE UP (ref 0–0)
PLATELET # BLD AUTO: 215 K/UL — SIGNIFICANT CHANGE UP (ref 150–400)
POTASSIUM SERPL-MCNC: 4.6 MMOL/L — SIGNIFICANT CHANGE UP (ref 3.5–5.3)
POTASSIUM SERPL-SCNC: 4.6 MMOL/L — SIGNIFICANT CHANGE UP (ref 3.5–5.3)
RBC # BLD: 4.13 M/UL — LOW (ref 4.2–5.8)
RBC # FLD: 12.9 % — SIGNIFICANT CHANGE UP (ref 10.3–14.5)
SODIUM SERPL-SCNC: 137 MMOL/L — SIGNIFICANT CHANGE UP (ref 135–145)
WBC # BLD: 11.25 K/UL — HIGH (ref 3.8–10.5)
WBC # FLD AUTO: 11.25 K/UL — HIGH (ref 3.8–10.5)

## 2020-03-05 RX ADMIN — OXYCODONE AND ACETAMINOPHEN 2 TABLET(S): 5; 325 TABLET ORAL at 19:53

## 2020-03-05 RX ADMIN — OXYCODONE AND ACETAMINOPHEN 2 TABLET(S): 5; 325 TABLET ORAL at 12:25

## 2020-03-05 RX ADMIN — RISPERIDONE 0.5 MILLIGRAM(S): 4 TABLET ORAL at 13:13

## 2020-03-05 RX ADMIN — Medication 30 UNIT(S): at 12:30

## 2020-03-05 RX ADMIN — Medication 650 MILLIGRAM(S): at 15:47

## 2020-03-05 RX ADMIN — PANTOPRAZOLE SODIUM 40 MILLIGRAM(S): 20 TABLET, DELAYED RELEASE ORAL at 06:25

## 2020-03-05 RX ADMIN — Medication 650 MILLIGRAM(S): at 14:47

## 2020-03-05 RX ADMIN — ATORVASTATIN CALCIUM 40 MILLIGRAM(S): 80 TABLET, FILM COATED ORAL at 21:14

## 2020-03-05 RX ADMIN — NYSTATIN CREAM 1 APPLICATION(S): 100000 CREAM TOPICAL at 21:14

## 2020-03-05 RX ADMIN — Medication 650 MILLIGRAM(S): at 21:10

## 2020-03-05 RX ADMIN — RISPERIDONE 0.5 MILLIGRAM(S): 4 TABLET ORAL at 21:14

## 2020-03-05 RX ADMIN — LISINOPRIL 2.5 MILLIGRAM(S): 2.5 TABLET ORAL at 06:24

## 2020-03-05 RX ADMIN — Medication 1: at 08:38

## 2020-03-05 RX ADMIN — BUDESONIDE AND FORMOTEROL FUMARATE DIHYDRATE 2 PUFF(S): 160; 4.5 AEROSOL RESPIRATORY (INHALATION) at 17:41

## 2020-03-05 RX ADMIN — Medication 30 UNIT(S): at 08:37

## 2020-03-05 RX ADMIN — Medication 81 MILLIGRAM(S): at 12:25

## 2020-03-05 RX ADMIN — ENOXAPARIN SODIUM 40 MILLIGRAM(S): 100 INJECTION SUBCUTANEOUS at 12:29

## 2020-03-05 RX ADMIN — Medication 3 MILLILITER(S): at 06:25

## 2020-03-05 RX ADMIN — BUDESONIDE AND FORMOTEROL FUMARATE DIHYDRATE 2 PUFF(S): 160; 4.5 AEROSOL RESPIRATORY (INHALATION) at 06:25

## 2020-03-05 RX ADMIN — Medication 3 MILLILITER(S): at 12:29

## 2020-03-05 RX ADMIN — Medication 5 MILLIGRAM(S): at 21:13

## 2020-03-05 RX ADMIN — Medication 30 UNIT(S): at 17:42

## 2020-03-05 RX ADMIN — Medication 3 MILLILITER(S): at 17:41

## 2020-03-05 RX ADMIN — Medication 1: at 17:42

## 2020-03-05 RX ADMIN — Medication 650 MILLIGRAM(S): at 08:38

## 2020-03-05 RX ADMIN — Medication 3 MILLILITER(S): at 23:03

## 2020-03-05 RX ADMIN — Medication 20 MILLIGRAM(S): at 06:24

## 2020-03-05 RX ADMIN — OXYCODONE AND ACETAMINOPHEN 2 TABLET(S): 5; 325 TABLET ORAL at 06:24

## 2020-03-05 RX ADMIN — Medication 650 MILLIGRAM(S): at 09:38

## 2020-03-05 RX ADMIN — OXYCODONE AND ACETAMINOPHEN 2 TABLET(S): 5; 325 TABLET ORAL at 07:00

## 2020-03-05 RX ADMIN — INSULIN GLARGINE 75 UNIT(S): 100 INJECTION, SOLUTION SUBCUTANEOUS at 22:14

## 2020-03-05 RX ADMIN — RISPERIDONE 0.5 MILLIGRAM(S): 4 TABLET ORAL at 06:24

## 2020-03-05 RX ADMIN — NYSTATIN CREAM 1 APPLICATION(S): 100000 CREAM TOPICAL at 06:25

## 2020-03-05 RX ADMIN — OXYCODONE AND ACETAMINOPHEN 2 TABLET(S): 5; 325 TABLET ORAL at 13:25

## 2020-03-05 RX ADMIN — Medication 650 MILLIGRAM(S): at 21:45

## 2020-03-05 RX ADMIN — SERTRALINE 50 MILLIGRAM(S): 25 TABLET, FILM COATED ORAL at 21:14

## 2020-03-05 NOTE — PROGRESS NOTE ADULT - SUBJECTIVE AND OBJECTIVE BOX
Patient is a 59y old  Male who presents with a chief complaint of 59M w/ n/v/d and hyperglycemia sent in from [facility] (05 Mar 2020 14:26)    pt. seen and examined     INTERVAL HPI/OVERNIGHT EVENTS:  T(C): 36.4 (03-05-20 @ 23:58), Max: 36.8 (03-05-20 @ 05:41)  HR: 97 (03-05-20 @ 23:58) (74 - 101)  BP: 111/71 (03-05-20 @ 23:58) (102/57 - 114/76)  RR: 18 (03-05-20 @ 23:58) (18 - 18)  SpO2: 97% (03-05-20 @ 23:58) (97% - 98%)  Wt(kg): --  I&O's Summary    04 Mar 2020 07:01  -  05 Mar 2020 07:00  --------------------------------------------------------  IN: 1360 mL / OUT: 1700 mL / NET: -340 mL    05 Mar 2020 07:01  -  06 Mar 2020 00:47  --------------------------------------------------------  IN: 1440 mL / OUT: 1650 mL / NET: -210 mL        PAST MEDICAL & SURGICAL HISTORY:  Oxygen dependent  Gastroesophageal reflux disease, esophagitis presence not specified  Smoker  Bipolar 1 disorder  Coronary artery disease involving native coronary artery of native heart without angina pectoris  Type 2 diabetes mellitus without complication, with long-term current use of insulin  Pulmonary HTN  HLD (hyperlipidemia)  Stented coronary artery  COPD (chronic obstructive pulmonary disease)  No significant past surgical history      SOCIAL HISTORY  Alcohol:  Tobacco:  Illicit substance use:    FAMILY HISTORY:    REVIEW OF SYSTEMS:  CONSTITUTIONAL: No fever, weight loss, or fatigue  EYES: No eye pain, visual disturbances, or discharge  ENMT:  No difficulty hearing, tinnitus, vertigo; No sinus or throat pain  NECK: No pain or stiffness  RESPIRATORY: No cough, wheezing, chills or hemoptysis; No shortness of breath  CARDIOVASCULAR: No chest pain, palpitations, dizziness, or leg swelling  GASTROINTESTINAL: No abdominal or epigastric pain. No nausea, vomiting, or hematemesis; No diarrhea or constipation. No melena or hematochezia.  GENITOURINARY: No dysuria, frequency, hematuria, or incontinence  NEUROLOGICAL: No headaches, memory loss, loss of strength, numbness, or tremors  SKIN: No itching, burning, rashes, or lesions   LYMPH NODES: No enlarged glands  ENDOCRINE: No heat or cold intolerance; No hair loss  MUSCULOSKELETAL: No joint pain or swelling; No muscle, back, or extremity pain  PSYCHIATRIC: No depression, anxiety, mood swings, or difficulty sleeping  HEME/LYMPH: No easy bruising, or bleeding gums  ALLERY AND IMMUNOLOGIC: No hives or eczema    RADIOLOGY & ADDITIONAL TESTS:    Imaging Personally Reviewed:  [ ] YES  [ ] NO    Consultant(s) Notes Reviewed:  [ ] YES  [ ] NO    PHYSICAL EXAM:  GENERAL: NAD, well-groomed, well-developed  HEAD:  Atraumatic, Normocephalic  EYES: EOMI, PERRLA, conjunctiva and sclera clear  ENMT: No tonsillar erythema, exudates, or enlargement; Moist mucous membranes, Good dentition, No lesions  NECK: Supple, No JVD, Normal thyroid  NERVOUS SYSTEM:  Alert & Oriented X3, Good concentration; Motor Strength 5/5 B/L upper and lower extremities; DTRs 2+ intact and symmetric  CHEST/LUNG: Clear to percussion bilaterally; No rales, rhonchi, wheezing, or rubs  HEART: Regular rate and rhythm; No murmurs, rubs, or gallops  ABDOMEN: Soft, Nontender, Nondistended; Bowel sounds present  EXTREMITIES:  2+ Peripheral Pulses, No clubbing, cyanosis, or edema  LYMPH: No lymphadenopathy noted  SKIN: No rashes or lesions    LABS:                        11.9   11.25 )-----------( 215      ( 05 Mar 2020 15:06 )             37.3     03-05    137  |  94<L>  |  23  ----------------------------<  163<H>  4.6   |  26  |  0.73    Ca    9.3      05 Mar 2020 15:06          CAPILLARY BLOOD GLUCOSE      POCT Blood Glucose.: 200 mg/dL (05 Mar 2020 21:49)  POCT Blood Glucose.: 155 mg/dL (05 Mar 2020 17:34)  POCT Blood Glucose.: 116 mg/dL (05 Mar 2020 12:23)  POCT Blood Glucose.: 181 mg/dL (05 Mar 2020 08:31)            MEDICATIONS  (STANDING):  albuterol/ipratropium for Nebulization 3 milliLiter(s) Nebulizer every 6 hours  aspirin enteric coated 81 milliGRAM(s) Oral daily  atorvastatin 40 milliGRAM(s) Oral at bedtime  budesonide 160 MICROgram(s)/formoterol 4.5 MICROgram(s) Inhaler 2 Puff(s) Inhalation two times a day  dextrose 5%. 1000 milliLiter(s) (50 mL/Hr) IV Continuous <Continuous>  dextrose 50% Injectable 12.5 Gram(s) IV Push once  dextrose 50% Injectable 25 Gram(s) IV Push once  dextrose 50% Injectable 25 Gram(s) IV Push once  enoxaparin Injectable 40 milliGRAM(s) SubCutaneous daily  furosemide    Tablet 20 milliGRAM(s) Oral daily  insulin glargine Injectable (LANTUS) 75 Unit(s) SubCutaneous at bedtime  insulin lispro (HumaLOG) corrective regimen sliding scale   SubCutaneous three times a day before meals  insulin lispro (HumaLOG) corrective regimen sliding scale   SubCutaneous at bedtime  insulin lispro Injectable (HumaLOG) 30 Unit(s) SubCutaneous three times a day with meals  lisinopril 2.5 milliGRAM(s) Oral daily  melatonin 5 milliGRAM(s) Oral at bedtime  nicotine - Inhaler 1 Each Inhalation daily  nystatin Powder 1 Application(s) Topical two times a day  pantoprazole    Tablet 40 milliGRAM(s) Oral daily  polyethylene glycol 3350 17 Gram(s) Oral every 12 hours  risperiDONE   Tablet 0.5 milliGRAM(s) Oral three times a day  senna 2 Tablet(s) Oral at bedtime  sertraline 50 milliGRAM(s) Oral at bedtime    MEDICATIONS  (PRN):  acetaminophen   Tablet .. 650 milliGRAM(s) Oral every 6 hours PRN Mild Pain (1 - 3)  aluminum hydroxide/magnesium hydroxide/simethicone Suspension 30 milliLiter(s) Oral every 4 hours PRN Dyspepsia  bisacodyl 5 milliGRAM(s) Oral every 12 hours PRN Constipation  dextrose 40% Gel 15 Gram(s) Oral once PRN Blood Glucose LESS THAN 70 milliGRAM(s)/deciliter  glucagon  Injectable 1 milliGRAM(s) IntraMuscular once PRN Glucose LESS THAN 70 milligrams/deciliter  oxycodone    5 mG/acetaminophen 325 mG 2 Tablet(s) Oral every 6 hours PRN Moderate Pain (4 - 6)      Care Discussed with Consultants/Other Providers [ ] YES  [ ] NO

## 2020-03-05 NOTE — PROGRESS NOTE ADULT - ASSESSMENT
Assessment  DMT2: 59y Male with DM T2 with hyperglycemia, A1C 10.4%, on basal bolus insulin, FS trending within overall acceptable range, no hypoglycemic episodes. Patient is eating full meals with good appetite, alert and comfortable, no complaints, no acute events. DC pending placement.  CAD: on medications, no chest pain, stable, monitored.  HTN: Controlled,  on antihypertensive medications.  Overweight/Obesity: No strict exercise routines, not on any weight loss program,  neither on low  calorie diet.          Laury Romero MD  Cell: [phone]  Office: [phone] Assessment  DMT2: 59y Male with DM T2 with hyperglycemia, A1C 10.4%, on basal bolus insulin, FS trending within overall acceptable range, no hypoglycemic episodes. Patient is eating full meals with good appetite,  alert and comfortable, no complaints, no acute events. DC pending placement.  CAD: on medications, no chest pain, stable, monitored.  HTN: Controlled,  on antihypertensive medications.  Overweight/Obesity: No strict exercise routines, not on any weight loss program,  neither on low  calorie diet.          Laury Romero MD  Cell: [phone]  Office: [phone]

## 2020-03-05 NOTE — PROGRESS NOTE ADULT - SUBJECTIVE AND OBJECTIVE BOX
Chief complaint  Patient is a 59y old  Male who presents with a chief complaint of 59M w/ n/v/d and hyperglycemia sent in from [facility] (04 Mar 2020 23:01)   Review of systems  Patient in bed, looks comfortable, no hypoglycemia.    Labs and Fingersticks  CAPILLARY BLOOD GLUCOSE      POCT Blood Glucose.: 116 mg/dL (05 Mar 2020 12:23)  POCT Blood Glucose.: 181 mg/dL (05 Mar 2020 08:31)  POCT Blood Glucose.: 210 mg/dL (04 Mar 2020 21:42)  POCT Blood Glucose.: 215 mg/dL (04 Mar 2020 17:08)                        Medications  MEDICATIONS  (STANDING):  albuterol/ipratropium for Nebulization 3 milliLiter(s) Nebulizer every 6 hours  aspirin enteric coated 81 milliGRAM(s) Oral daily  atorvastatin 40 milliGRAM(s) Oral at bedtime  budesonide 160 MICROgram(s)/formoterol 4.5 MICROgram(s) Inhaler 2 Puff(s) Inhalation two times a day  dextrose 5%. 1000 milliLiter(s) (50 mL/Hr) IV Continuous <Continuous>  dextrose 50% Injectable 12.5 Gram(s) IV Push once  dextrose 50% Injectable 25 Gram(s) IV Push once  dextrose 50% Injectable 25 Gram(s) IV Push once  enoxaparin Injectable 40 milliGRAM(s) SubCutaneous daily  furosemide    Tablet 20 milliGRAM(s) Oral daily  insulin glargine Injectable (LANTUS) 75 Unit(s) SubCutaneous at bedtime  insulin lispro (HumaLOG) corrective regimen sliding scale   SubCutaneous three times a day before meals  insulin lispro (HumaLOG) corrective regimen sliding scale   SubCutaneous at bedtime  insulin lispro Injectable (HumaLOG) 30 Unit(s) SubCutaneous three times a day with meals  lisinopril 2.5 milliGRAM(s) Oral daily  melatonin 5 milliGRAM(s) Oral at bedtime  nicotine - Inhaler 1 Each Inhalation daily  nystatin Powder 1 Application(s) Topical two times a day  pantoprazole    Tablet 40 milliGRAM(s) Oral daily  polyethylene glycol 3350 17 Gram(s) Oral every 12 hours  risperiDONE   Tablet 0.5 milliGRAM(s) Oral three times a day  senna 2 Tablet(s) Oral at bedtime  sertraline 50 milliGRAM(s) Oral at bedtime      Physical Exam  General: Patient comfortable in bed  Vital Signs Last 12 Hrs  T(F): 98.2 (03-05-20 @ 14:01), Max: 98.3 (03-05-20 @ 05:41)  HR: 101 (03-05-20 @ 14:01) (74 - 101)  BP: 114/76 (03-05-20 @ 14:01) (102/57 - 114/76)  BP(mean): --  RR: 18 (03-05-20 @ 14:01) (18 - 18)  SpO2: 97% (03-05-20 @ 14:01) (97% - 98%)  Neck: No palpable thyroid nodules.  CVS: S1S2, No murmurs  Respiratory: No wheezing, no crepitations  GI: Abdomen soft, bowel sounds positive  Musculoskeletal:  edema lower extremities.   Skin: No skin rashes, no ecchymosis    Diagnostics Chief complaint  Patient is a 59y old  Male who presents with a chief complaint of 59M w/ n/v/d and hyperglycemia sent in from [facility] (04 Mar 2020 23:01)   Review of systems  Patient in bed, looks comfortable, no  hypoglycemia.    Labs and Fingersticks  CAPILLARY BLOOD GLUCOSE      POCT Blood Glucose.: 116 mg/dL (05 Mar 2020 12:23)  POCT Blood Glucose.: 181 mg/dL (05 Mar 2020 08:31)  POCT Blood Glucose.: 210 mg/dL (04 Mar 2020 21:42)  POCT Blood Glucose.: 215 mg/dL (04 Mar 2020 17:08)    Medications  MEDICATIONS  (STANDING):  albuterol/ipratropium for Nebulization 3 milliLiter(s) Nebulizer every 6 hours  aspirin enteric coated 81 milliGRAM(s) Oral daily  atorvastatin 40 milliGRAM(s) Oral at bedtime  budesonide 160 MICROgram(s)/formoterol 4.5 MICROgram(s) Inhaler 2 Puff(s) Inhalation two times a day  dextrose 5%. 1000 milliLiter(s) (50 mL/Hr) IV Continuous <Continuous>  dextrose 50% Injectable 12.5 Gram(s) IV Push once  dextrose 50% Injectable 25 Gram(s) IV Push once  dextrose 50% Injectable 25 Gram(s) IV Push once  enoxaparin Injectable 40 milliGRAM(s) SubCutaneous daily  furosemide    Tablet 20 milliGRAM(s) Oral daily  insulin glargine Injectable (LANTUS) 75 Unit(s) SubCutaneous at bedtime  insulin lispro (HumaLOG) corrective regimen sliding scale   SubCutaneous three times a day before meals  insulin lispro (HumaLOG) corrective regimen sliding scale   SubCutaneous at bedtime  insulin lispro Injectable (HumaLOG) 30 Unit(s) SubCutaneous three times a day with meals  lisinopril 2.5 milliGRAM(s) Oral daily  melatonin 5 milliGRAM(s) Oral at bedtime  nicotine - Inhaler 1 Each Inhalation daily  nystatin Powder 1 Application(s) Topical two times a day  pantoprazole    Tablet 40 milliGRAM(s) Oral daily  polyethylene glycol 3350 17 Gram(s) Oral every 12 hours  risperiDONE   Tablet 0.5 milliGRAM(s) Oral three times a day  senna 2 Tablet(s) Oral at bedtime  sertraline 50 milliGRAM(s) Oral at bedtime      Physical Exam  General: Patient comfortable in bed  Vital Signs Last 12 Hrs  T(F): 98.2 (03-05-20 @ 14:01), Max: 98.3 (03-05-20 @ 05:41)  HR: 101 (03-05-20 @ 14:01) (74 - 101)  BP: 114/76 (03-05-20 @ 14:01) (102/57 - 114/76)  BP(mean): --  RR: 18 (03-05-20 @ 14:01) (18 - 18)  SpO2: 97% (03-05-20 @ 14:01) (97% - 98%)  Neck: No palpable thyroid nodules.  CVS: S1S2, No murmurs  Respiratory: No wheezing, no crepitations  GI: Abdomen soft, bowel sounds positive  Musculoskeletal:  edema lower extremities.   Skin: No skin rashes, no ecchymosis    Diagnostics

## 2020-03-06 LAB
GLUCOSE BLDC GLUCOMTR-MCNC: 199 MG/DL — HIGH (ref 70–99)
GLUCOSE BLDC GLUCOMTR-MCNC: 218 MG/DL — HIGH (ref 70–99)
GLUCOSE BLDC GLUCOMTR-MCNC: 237 MG/DL — HIGH (ref 70–99)
GLUCOSE BLDC GLUCOMTR-MCNC: 278 MG/DL — HIGH (ref 70–99)

## 2020-03-06 RX ADMIN — Medication 81 MILLIGRAM(S): at 11:48

## 2020-03-06 RX ADMIN — SERTRALINE 50 MILLIGRAM(S): 25 TABLET, FILM COATED ORAL at 21:02

## 2020-03-06 RX ADMIN — Medication 30 UNIT(S): at 08:29

## 2020-03-06 RX ADMIN — OXYCODONE AND ACETAMINOPHEN 2 TABLET(S): 5; 325 TABLET ORAL at 15:54

## 2020-03-06 RX ADMIN — Medication 3 MILLILITER(S): at 11:48

## 2020-03-06 RX ADMIN — BUDESONIDE AND FORMOTEROL FUMARATE DIHYDRATE 2 PUFF(S): 160; 4.5 AEROSOL RESPIRATORY (INHALATION) at 05:52

## 2020-03-06 RX ADMIN — Medication 650 MILLIGRAM(S): at 19:46

## 2020-03-06 RX ADMIN — Medication 30 UNIT(S): at 17:12

## 2020-03-06 RX ADMIN — INSULIN GLARGINE 75 UNIT(S): 100 INJECTION, SOLUTION SUBCUTANEOUS at 22:40

## 2020-03-06 RX ADMIN — Medication 3 MILLILITER(S): at 17:13

## 2020-03-06 RX ADMIN — RISPERIDONE 0.5 MILLIGRAM(S): 4 TABLET ORAL at 21:02

## 2020-03-06 RX ADMIN — Medication 3 MILLILITER(S): at 05:52

## 2020-03-06 RX ADMIN — Medication 650 MILLIGRAM(S): at 12:50

## 2020-03-06 RX ADMIN — ATORVASTATIN CALCIUM 40 MILLIGRAM(S): 80 TABLET, FILM COATED ORAL at 21:02

## 2020-03-06 RX ADMIN — RISPERIDONE 0.5 MILLIGRAM(S): 4 TABLET ORAL at 13:13

## 2020-03-06 RX ADMIN — Medication 2: at 12:48

## 2020-03-06 RX ADMIN — Medication 0: at 22:41

## 2020-03-06 RX ADMIN — Medication 20 MILLIGRAM(S): at 05:53

## 2020-03-06 RX ADMIN — Medication 650 MILLIGRAM(S): at 11:50

## 2020-03-06 RX ADMIN — Medication 30 UNIT(S): at 12:48

## 2020-03-06 RX ADMIN — LISINOPRIL 2.5 MILLIGRAM(S): 2.5 TABLET ORAL at 05:53

## 2020-03-06 RX ADMIN — NYSTATIN CREAM 1 APPLICATION(S): 100000 CREAM TOPICAL at 05:55

## 2020-03-06 RX ADMIN — Medication 3: at 08:29

## 2020-03-06 RX ADMIN — PANTOPRAZOLE SODIUM 40 MILLIGRAM(S): 20 TABLET, DELAYED RELEASE ORAL at 05:53

## 2020-03-06 RX ADMIN — Medication 2: at 17:12

## 2020-03-06 RX ADMIN — OXYCODONE AND ACETAMINOPHEN 2 TABLET(S): 5; 325 TABLET ORAL at 21:02

## 2020-03-06 RX ADMIN — ENOXAPARIN SODIUM 40 MILLIGRAM(S): 100 INJECTION SUBCUTANEOUS at 11:49

## 2020-03-06 RX ADMIN — OXYCODONE AND ACETAMINOPHEN 2 TABLET(S): 5; 325 TABLET ORAL at 08:33

## 2020-03-06 RX ADMIN — OXYCODONE AND ACETAMINOPHEN 2 TABLET(S): 5; 325 TABLET ORAL at 21:32

## 2020-03-06 RX ADMIN — OXYCODONE AND ACETAMINOPHEN 2 TABLET(S): 5; 325 TABLET ORAL at 14:54

## 2020-03-06 RX ADMIN — Medication 650 MILLIGRAM(S): at 06:22

## 2020-03-06 RX ADMIN — OXYCODONE AND ACETAMINOPHEN 2 TABLET(S): 5; 325 TABLET ORAL at 09:33

## 2020-03-06 RX ADMIN — SENNA PLUS 2 TABLET(S): 8.6 TABLET ORAL at 21:02

## 2020-03-06 RX ADMIN — Medication 5 MILLIGRAM(S): at 21:02

## 2020-03-06 RX ADMIN — BUDESONIDE AND FORMOTEROL FUMARATE DIHYDRATE 2 PUFF(S): 160; 4.5 AEROSOL RESPIRATORY (INHALATION) at 17:12

## 2020-03-06 RX ADMIN — Medication 650 MILLIGRAM(S): at 18:54

## 2020-03-06 RX ADMIN — NYSTATIN CREAM 1 APPLICATION(S): 100000 CREAM TOPICAL at 21:05

## 2020-03-06 RX ADMIN — Medication 650 MILLIGRAM(S): at 05:52

## 2020-03-06 RX ADMIN — RISPERIDONE 0.5 MILLIGRAM(S): 4 TABLET ORAL at 05:53

## 2020-03-06 NOTE — PROGRESS NOTE ADULT - ASSESSMENT
Assessment  DMT2: 59y Male with DM T2 with hyperglycemia, A1C 10.4%, on basal bolus insulin, FS running high today, no hypoglycemic episodes. Patient is eating full meals with good appetite,  alert and comfortable, no complaints, no acute events. Tentative DC date 3/10 [facility]  CAD: on medications, no chest pain, stable, monitored.  HTN: Controlled,  on antihypertensive medications.  Overweight/Obesity: No strict exercise routines, not on any weight loss program,  neither on low  calorie diet.          Laury Romero MD  Cell: [phone]  Office: [phone] Assessment  DMT2: 59y Male with DM T2 with hyperglycemia, A1C 10.4%, on basal bolus insulin, FS running high today, no hypoglycemic episodes. Patient is eating full meals with good appetite,  alert and comfortable, no complaints,  no acute events. Tentative DC date 3/10 to [facility]  CAD: on medications, no chest pain, stable, monitored.  HTN: Controlled,  on antihypertensive medications.  Overweight/Obesity: No strict exercise routines, not on any weight loss program,  neither on low  calorie diet.          Laury Romero MD  Cell: [phone]  Office: [phone]

## 2020-03-06 NOTE — PROGRESS NOTE ADULT - SUBJECTIVE AND OBJECTIVE BOX
Patient is a 59y old  Male who presents with a chief complaint of 59M w/ n/v/d and hyperglycemia sent in from [facility] (06 Mar 2020 14:19)      INTERVAL HPI/OVERNIGHT EVENTS:  T(C): 36.2 (03-06-20 @ 21:13), Max: 36.7 (03-06-20 @ 05:51)  HR: 92 (03-06-20 @ 21:13) (91 - 97)  BP: 105/71 (03-06-20 @ 21:13) (100/70 - 129/75)  RR: 18 (03-06-20 @ 21:13) (18 - 20)  SpO2: 94% (03-06-20 @ 21:13) (86% - 97%)  Wt(kg): --  I&O's Summary    05 Mar 2020 07:01  -  06 Mar 2020 07:00  --------------------------------------------------------  IN: 1440 mL / OUT: 2250 mL / NET: -810 mL    06 Mar 2020 07:01  -  06 Mar 2020 22:28  --------------------------------------------------------  IN: 1080 mL / OUT: 2000 mL / NET: -920 mL        PAST MEDICAL & SURGICAL HISTORY:  Oxygen dependent  Gastroesophageal reflux disease, esophagitis presence not specified  Smoker  Bipolar 1 disorder  Coronary artery disease involving native coronary artery of native heart without angina pectoris  Type 2 diabetes mellitus without complication, with long-term current use of insulin  Pulmonary HTN  HLD (hyperlipidemia)  Stented coronary artery  COPD (chronic obstructive pulmonary disease)  No significant past surgical history      SOCIAL HISTORY  Alcohol:  Tobacco:  Illicit substance use:    FAMILY HISTORY:    REVIEW OF SYSTEMS:  CONSTITUTIONAL: No fever, weight loss, or fatigue  EYES: No eye pain, visual disturbances, or discharge  ENMT:  No difficulty hearing, tinnitus, vertigo; No sinus or throat pain  NECK: No pain or stiffness  RESPIRATORY: No cough, wheezing, chills or hemoptysis; No shortness of breath  CARDIOVASCULAR: No chest pain, palpitations, dizziness, or leg swelling  GASTROINTESTINAL: No abdominal or epigastric pain. No nausea, vomiting, or hematemesis; No diarrhea or constipation. No melena or hematochezia.  GENITOURINARY: No dysuria, frequency, hematuria, or incontinence  NEUROLOGICAL: No headaches, memory loss, loss of strength, numbness, or tremors  SKIN: No itching, burning, rashes, or lesions   LYMPH NODES: No enlarged glands  ENDOCRINE: No heat or cold intolerance; No hair loss  MUSCULOSKELETAL: No joint pain or swelling; No muscle, back, or extremity pain  PSYCHIATRIC: No depression, anxiety, mood swings, or difficulty sleeping  HEME/LYMPH: No easy bruising, or bleeding gums  ALLERY AND IMMUNOLOGIC: No hives or eczema    RADIOLOGY & ADDITIONAL TESTS:    Imaging Personally Reviewed:  [ ] YES  [ ] NO    Consultant(s) Notes Reviewed:  [ ] YES  [ ] NO    PHYSICAL EXAM:  GENERAL: NAD, well-groomed, well-developed  HEAD:  Atraumatic, Normocephalic  EYES: EOMI, PERRLA, conjunctiva and sclera clear  ENMT: No tonsillar erythema, exudates, or enlargement; Moist mucous membranes, Good dentition, No lesions  NECK: Supple, No JVD, Normal thyroid  NERVOUS SYSTEM:  Alert & Oriented X3, Good concentration; Motor Strength 5/5 B/L upper and lower extremities; DTRs 2+ intact and symmetric  CHEST/LUNG: Clear to percussion bilaterally; No rales, rhonchi, wheezing, or rubs  HEART: Regular rate and rhythm; No murmurs, rubs, or gallops  ABDOMEN: Soft, Nontender, Nondistended; Bowel sounds present  EXTREMITIES:  2+ Peripheral Pulses, No clubbing, cyanosis, or edema  LYMPH: No lymphadenopathy noted  SKIN: No rashes or lesions    LABS:                        11.9   11.25 )-----------( 215      ( 05 Mar 2020 15:06 )             37.3     03-05    137  |  94<L>  |  23  ----------------------------<  163<H>  4.6   |  26  |  0.73    Ca    9.3      05 Mar 2020 15:06          CAPILLARY BLOOD GLUCOSE      POCT Blood Glucose.: 218 mg/dL (06 Mar 2020 17:01)  POCT Blood Glucose.: 237 mg/dL (06 Mar 2020 12:22)  POCT Blood Glucose.: 278 mg/dL (06 Mar 2020 08:21)            MEDICATIONS  (STANDING):  albuterol/ipratropium for Nebulization 3 milliLiter(s) Nebulizer every 6 hours  aspirin enteric coated 81 milliGRAM(s) Oral daily  atorvastatin 40 milliGRAM(s) Oral at bedtime  budesonide 160 MICROgram(s)/formoterol 4.5 MICROgram(s) Inhaler 2 Puff(s) Inhalation two times a day  dextrose 5%. 1000 milliLiter(s) (50 mL/Hr) IV Continuous <Continuous>  dextrose 50% Injectable 12.5 Gram(s) IV Push once  dextrose 50% Injectable 25 Gram(s) IV Push once  dextrose 50% Injectable 25 Gram(s) IV Push once  enoxaparin Injectable 40 milliGRAM(s) SubCutaneous daily  furosemide    Tablet 20 milliGRAM(s) Oral daily  insulin glargine Injectable (LANTUS) 75 Unit(s) SubCutaneous at bedtime  insulin lispro (HumaLOG) corrective regimen sliding scale   SubCutaneous three times a day before meals  insulin lispro (HumaLOG) corrective regimen sliding scale   SubCutaneous at bedtime  insulin lispro Injectable (HumaLOG) 30 Unit(s) SubCutaneous three times a day with meals  lisinopril 2.5 milliGRAM(s) Oral daily  melatonin 5 milliGRAM(s) Oral at bedtime  nicotine - Inhaler 1 Each Inhalation daily  nystatin Powder 1 Application(s) Topical two times a day  pantoprazole    Tablet 40 milliGRAM(s) Oral daily  polyethylene glycol 3350 17 Gram(s) Oral every 12 hours  risperiDONE   Tablet 0.5 milliGRAM(s) Oral three times a day  senna 2 Tablet(s) Oral at bedtime  sertraline 50 milliGRAM(s) Oral at bedtime    MEDICATIONS  (PRN):  acetaminophen   Tablet .. 650 milliGRAM(s) Oral every 6 hours PRN Mild Pain (1 - 3)  aluminum hydroxide/magnesium hydroxide/simethicone Suspension 30 milliLiter(s) Oral every 4 hours PRN Dyspepsia  bisacodyl 5 milliGRAM(s) Oral every 12 hours PRN Constipation  dextrose 40% Gel 15 Gram(s) Oral once PRN Blood Glucose LESS THAN 70 milliGRAM(s)/deciliter  glucagon  Injectable 1 milliGRAM(s) IntraMuscular once PRN Glucose LESS THAN 70 milligrams/deciliter  oxycodone    5 mG/acetaminophen 325 mG 2 Tablet(s) Oral every 6 hours PRN Moderate Pain (4 - 6)      Care Discussed with Consultants/Other Providers [ ] YES  [ ] NO

## 2020-03-06 NOTE — PROGRESS NOTE ADULT - SUBJECTIVE AND OBJECTIVE BOX
Chief complaint  Patient is a 59y old  Male who presents with a chief complaint of 59M w/ n/v/d and hyperglycemia sent in from [facility] (05 Mar 2020 20:47)   Review of systems  Patient in bed, looks comfortable, no hypoglycemia.    Labs and Fingersticks  CAPILLARY BLOOD GLUCOSE      POCT Blood Glucose.: 237 mg/dL (06 Mar 2020 12:22)  POCT Blood Glucose.: 278 mg/dL (06 Mar 2020 08:21)  POCT Blood Glucose.: 200 mg/dL (05 Mar 2020 21:49)  POCT Blood Glucose.: 155 mg/dL (05 Mar 2020 17:34)      Anion Gap, Serum: 17 (03-05 @ 15:06)      Calcium, Total Serum: 9.3 (03-05 @ 15:06)          03-05    137  |  94<L>  |  23  ----------------------------<  163<H>  4.6   |  26  |  0.73    Ca    9.3      05 Mar 2020 15:06                          11.9   11.25 )-----------( 215      ( 05 Mar 2020 15:06 )             37.3     Medications  MEDICATIONS  (STANDING):  albuterol/ipratropium for Nebulization 3 milliLiter(s) Nebulizer every 6 hours  aspirin enteric coated 81 milliGRAM(s) Oral daily  atorvastatin 40 milliGRAM(s) Oral at bedtime  budesonide 160 MICROgram(s)/formoterol 4.5 MICROgram(s) Inhaler 2 Puff(s) Inhalation two times a day  dextrose 5%. 1000 milliLiter(s) (50 mL/Hr) IV Continuous <Continuous>  dextrose 50% Injectable 12.5 Gram(s) IV Push once  dextrose 50% Injectable 25 Gram(s) IV Push once  dextrose 50% Injectable 25 Gram(s) IV Push once  enoxaparin Injectable 40 milliGRAM(s) SubCutaneous daily  furosemide    Tablet 20 milliGRAM(s) Oral daily  insulin glargine Injectable (LANTUS) 75 Unit(s) SubCutaneous at bedtime  insulin lispro (HumaLOG) corrective regimen sliding scale   SubCutaneous three times a day before meals  insulin lispro (HumaLOG) corrective regimen sliding scale   SubCutaneous at bedtime  insulin lispro Injectable (HumaLOG) 30 Unit(s) SubCutaneous three times a day with meals  lisinopril 2.5 milliGRAM(s) Oral daily  melatonin 5 milliGRAM(s) Oral at bedtime  nicotine - Inhaler 1 Each Inhalation daily  nystatin Powder 1 Application(s) Topical two times a day  pantoprazole    Tablet 40 milliGRAM(s) Oral daily  polyethylene glycol 3350 17 Gram(s) Oral every 12 hours  risperiDONE   Tablet 0.5 milliGRAM(s) Oral three times a day  senna 2 Tablet(s) Oral at bedtime  sertraline 50 milliGRAM(s) Oral at bedtime      Physical Exam  General: Patient comfortable in bed  Vital Signs Last 12 Hrs  T(F): 97.4 (03-06-20 @ 14:14), Max: 98 (03-06-20 @ 05:51)  HR: 95 (03-06-20 @ 14:14) (91 - 95)  BP: 129/75 (03-06-20 @ 14:14) (100/70 - 129/75)  BP(mean): --  RR: 20 (03-06-20 @ 14:14) (18 - 20)  SpO2: 96% (03-06-20 @ 14:14) (95% - 96%)  Neck: No palpable thyroid nodules.  CVS: S1S2, No murmurs  Respiratory: No wheezing, no crepitations  GI: Abdomen soft, bowel sounds positive  Musculoskeletal:  edema lower extremities.   Skin: No skin rashes, no ecchymosis    Diagnostics Chief complaint  Patient is a 59y old  Male who presents with a chief complaint of 59M w/ n/v/d and hyperglycemia sent in from [facility] (05 Mar 2020 20:47)   Review of systems  Patient in bed, looks comfortable, no  hypoglycemia.    Labs and Fingersticks  CAPILLARY BLOOD GLUCOSE      POCT Blood Glucose.: 237 mg/dL (06 Mar 2020 12:22)  POCT Blood Glucose.: 278 mg/dL (06 Mar 2020 08:21)  POCT Blood Glucose.: 200 mg/dL (05 Mar 2020 21:49)  POCT Blood Glucose.: 155 mg/dL (05 Mar 2020 17:34)      Anion Gap, Serum: 17 (03-05 @ 15:06)      Calcium, Total Serum: 9.3 (03-05 @ 15:06)          03-05    137  |  94<L>  |  23  ----------------------------<  163<H>  4.6   |  26  |  0.73    Ca    9.3      05 Mar 2020 15:06                          11.9   11.25 )-----------( 215      ( 05 Mar 2020 15:06 )             37.3     Medications  MEDICATIONS  (STANDING):  albuterol/ipratropium for Nebulization 3 milliLiter(s) Nebulizer every 6 hours  aspirin enteric coated 81 milliGRAM(s) Oral daily  atorvastatin 40 milliGRAM(s) Oral at bedtime  budesonide 160 MICROgram(s)/formoterol 4.5 MICROgram(s) Inhaler 2 Puff(s) Inhalation two times a day  dextrose 5%. 1000 milliLiter(s) (50 mL/Hr) IV Continuous <Continuous>  dextrose 50% Injectable 12.5 Gram(s) IV Push once  dextrose 50% Injectable 25 Gram(s) IV Push once  dextrose 50% Injectable 25 Gram(s) IV Push once  enoxaparin Injectable 40 milliGRAM(s) SubCutaneous daily  furosemide    Tablet 20 milliGRAM(s) Oral daily  insulin glargine Injectable (LANTUS) 75 Unit(s) SubCutaneous at bedtime  insulin lispro (HumaLOG) corrective regimen sliding scale   SubCutaneous three times a day before meals  insulin lispro (HumaLOG) corrective regimen sliding scale   SubCutaneous at bedtime  insulin lispro Injectable (HumaLOG) 30 Unit(s) SubCutaneous three times a day with meals  lisinopril 2.5 milliGRAM(s) Oral daily  melatonin 5 milliGRAM(s) Oral at bedtime  nicotine - Inhaler 1 Each Inhalation daily  nystatin Powder 1 Application(s) Topical two times a day  pantoprazole    Tablet 40 milliGRAM(s) Oral daily  polyethylene glycol 3350 17 Gram(s) Oral every 12 hours  risperiDONE   Tablet 0.5 milliGRAM(s) Oral three times a day  senna 2 Tablet(s) Oral at bedtime  sertraline 50 milliGRAM(s) Oral at bedtime      Physical Exam  General: Patient comfortable in bed  Vital Signs Last 12 Hrs  T(F): 97.4 (03-06-20 @ 14:14), Max: 98 (03-06-20 @ 05:51)  HR: 95 (03-06-20 @ 14:14) (91 - 95)  BP: 129/75 (03-06-20 @ 14:14) (100/70 - 129/75)  BP(mean): --  RR: 20 (03-06-20 @ 14:14) (18 - 20)  SpO2: 96% (03-06-20 @ 14:14) (95% - 96%)  Neck: No palpable thyroid nodules.  CVS: S1S2, No murmurs  Respiratory: No wheezing, no crepitations  GI: Abdomen soft, bowel sounds positive  Musculoskeletal:  edema lower extremities.   Skin: No skin rashes, no ecchymosis    Diagnostics

## 2020-03-07 LAB
GLUCOSE BLDC GLUCOMTR-MCNC: 132 MG/DL — HIGH (ref 70–99)
GLUCOSE BLDC GLUCOMTR-MCNC: 173 MG/DL — HIGH (ref 70–99)
GLUCOSE BLDC GLUCOMTR-MCNC: 189 MG/DL — HIGH (ref 70–99)
GLUCOSE BLDC GLUCOMTR-MCNC: 217 MG/DL — HIGH (ref 70–99)

## 2020-03-07 RX ADMIN — Medication 30 UNIT(S): at 09:05

## 2020-03-07 RX ADMIN — RISPERIDONE 0.5 MILLIGRAM(S): 4 TABLET ORAL at 21:25

## 2020-03-07 RX ADMIN — Medication 3 MILLILITER(S): at 17:49

## 2020-03-07 RX ADMIN — OXYCODONE AND ACETAMINOPHEN 2 TABLET(S): 5; 325 TABLET ORAL at 08:26

## 2020-03-07 RX ADMIN — LISINOPRIL 2.5 MILLIGRAM(S): 2.5 TABLET ORAL at 05:14

## 2020-03-07 RX ADMIN — OXYCODONE AND ACETAMINOPHEN 2 TABLET(S): 5; 325 TABLET ORAL at 08:51

## 2020-03-07 RX ADMIN — Medication 3 MILLILITER(S): at 05:15

## 2020-03-07 RX ADMIN — ENOXAPARIN SODIUM 40 MILLIGRAM(S): 100 INJECTION SUBCUTANEOUS at 11:58

## 2020-03-07 RX ADMIN — Medication 20 MILLIGRAM(S): at 05:14

## 2020-03-07 RX ADMIN — BUDESONIDE AND FORMOTEROL FUMARATE DIHYDRATE 2 PUFF(S): 160; 4.5 AEROSOL RESPIRATORY (INHALATION) at 05:14

## 2020-03-07 RX ADMIN — OXYCODONE AND ACETAMINOPHEN 2 TABLET(S): 5; 325 TABLET ORAL at 14:51

## 2020-03-07 RX ADMIN — NYSTATIN CREAM 1 APPLICATION(S): 100000 CREAM TOPICAL at 21:25

## 2020-03-07 RX ADMIN — Medication 650 MILLIGRAM(S): at 05:13

## 2020-03-07 RX ADMIN — Medication 81 MILLIGRAM(S): at 11:58

## 2020-03-07 RX ADMIN — Medication 3 MILLILITER(S): at 11:57

## 2020-03-07 RX ADMIN — OXYCODONE AND ACETAMINOPHEN 2 TABLET(S): 5; 325 TABLET ORAL at 15:21

## 2020-03-07 RX ADMIN — Medication 650 MILLIGRAM(S): at 06:00

## 2020-03-07 RX ADMIN — INSULIN GLARGINE 75 UNIT(S): 100 INJECTION, SOLUTION SUBCUTANEOUS at 22:48

## 2020-03-07 RX ADMIN — NYSTATIN CREAM 1 APPLICATION(S): 100000 CREAM TOPICAL at 05:15

## 2020-03-07 RX ADMIN — Medication 5 MILLIGRAM(S): at 21:24

## 2020-03-07 RX ADMIN — Medication 650 MILLIGRAM(S): at 20:50

## 2020-03-07 RX ADMIN — Medication 30 UNIT(S): at 17:49

## 2020-03-07 RX ADMIN — BUDESONIDE AND FORMOTEROL FUMARATE DIHYDRATE 2 PUFF(S): 160; 4.5 AEROSOL RESPIRATORY (INHALATION) at 17:49

## 2020-03-07 RX ADMIN — Medication 1: at 13:03

## 2020-03-07 RX ADMIN — ATORVASTATIN CALCIUM 40 MILLIGRAM(S): 80 TABLET, FILM COATED ORAL at 21:25

## 2020-03-07 RX ADMIN — RISPERIDONE 0.5 MILLIGRAM(S): 4 TABLET ORAL at 12:00

## 2020-03-07 RX ADMIN — Medication 2: at 09:05

## 2020-03-07 RX ADMIN — SERTRALINE 50 MILLIGRAM(S): 25 TABLET, FILM COATED ORAL at 21:25

## 2020-03-07 RX ADMIN — Medication 30 UNIT(S): at 13:03

## 2020-03-07 RX ADMIN — Medication 650 MILLIGRAM(S): at 20:06

## 2020-03-07 RX ADMIN — RISPERIDONE 0.5 MILLIGRAM(S): 4 TABLET ORAL at 05:14

## 2020-03-07 RX ADMIN — PANTOPRAZOLE SODIUM 40 MILLIGRAM(S): 20 TABLET, DELAYED RELEASE ORAL at 05:14

## 2020-03-07 RX ADMIN — OXYCODONE AND ACETAMINOPHEN 2 TABLET(S): 5; 325 TABLET ORAL at 21:24

## 2020-03-07 RX ADMIN — OXYCODONE AND ACETAMINOPHEN 2 TABLET(S): 5; 325 TABLET ORAL at 22:00

## 2020-03-07 NOTE — PROGRESS NOTE ADULT - SUBJECTIVE AND OBJECTIVE BOX
Patient is a 59y old  Male who presents with a chief complaint of 59M w/ n/v/d and hyperglycemia sent in from [facility] (07 Mar 2020 10:29)      INTERVAL HPI/OVERNIGHT EVENTS:  T(C): 36.4 (03-07-20 @ 21:21), Max: 37.1 (03-07-20 @ 05:10)  HR: 91 (03-07-20 @ 21:21) (66 - 91)  BP: 109/67 (03-07-20 @ 21:21) (106/65 - 113/67)  RR: 18 (03-07-20 @ 21:21) (18 - 18)  SpO2: 97% (03-07-20 @ 21:21) (94% - 98%)  Wt(kg): --  I&O's Summary    06 Mar 2020 07:01  -  07 Mar 2020 07:00  --------------------------------------------------------  IN: 1260 mL / OUT: 2800 mL / NET: -1540 mL    07 Mar 2020 06:01  -  07 Mar 2020 22:04  --------------------------------------------------------  IN: 360 mL / OUT: 550 mL / NET: -190 mL        PAST MEDICAL & SURGICAL HISTORY:  Oxygen dependent  Gastroesophageal reflux disease, esophagitis presence not specified  Smoker  Bipolar 1 disorder  Coronary artery disease involving native coronary artery of native heart without angina pectoris  Type 2 diabetes mellitus without complication, with long-term current use of insulin  Pulmonary HTN  HLD (hyperlipidemia)  Stented coronary artery  COPD (chronic obstructive pulmonary disease)  No significant past surgical history      SOCIAL HISTORY  Alcohol:  Tobacco:  Illicit substance use:    FAMILY HISTORY:    REVIEW OF SYSTEMS:  CONSTITUTIONAL: No fever, weight loss, or fatigue  EYES: No eye pain, visual disturbances, or discharge  ENMT:  No difficulty hearing, tinnitus, vertigo; No sinus or throat pain  NECK: No pain or stiffness  RESPIRATORY: No cough, wheezing, chills or hemoptysis; No shortness of breath  CARDIOVASCULAR: No chest pain, palpitations, dizziness, or leg swelling  GASTROINTESTINAL: No abdominal or epigastric pain. No nausea, vomiting, or hematemesis; No diarrhea or constipation. No melena or hematochezia.  GENITOURINARY: No dysuria, frequency, hematuria, or incontinence  NEUROLOGICAL: No headaches, memory loss, loss of strength, numbness, or tremors  SKIN: No itching, burning, rashes, or lesions   LYMPH NODES: No enlarged glands  ENDOCRINE: No heat or cold intolerance; No hair loss  MUSCULOSKELETAL: No joint pain or swelling; No muscle, back, or extremity pain  PSYCHIATRIC: No depression, anxiety, mood swings, or difficulty sleeping  HEME/LYMPH: No easy bruising, or bleeding gums  ALLERY AND IMMUNOLOGIC: No hives or eczema    RADIOLOGY & ADDITIONAL TESTS:    Imaging Personally Reviewed:  [ ] YES  [ ] NO    Consultant(s) Notes Reviewed:  [ ] YES  [ ] NO    PHYSICAL EXAM:  GENERAL: NAD, well-groomed, well-developed  HEAD:  Atraumatic, Normocephalic  EYES: EOMI, PERRLA, conjunctiva and sclera clear  ENMT: No tonsillar erythema, exudates, or enlargement; Moist mucous membranes, Good dentition, No lesions  NECK: Supple, No JVD, Normal thyroid  NERVOUS SYSTEM:  Alert & Oriented X3, Good concentration; Motor Strength 5/5 B/L upper and lower extremities; DTRs 2+ intact and symmetric  CHEST/LUNG: Clear to percussion bilaterally; No rales, rhonchi, wheezing, or rubs  HEART: Regular rate and rhythm; No murmurs, rubs, or gallops  ABDOMEN: Soft, Nontender, Nondistended; Bowel sounds present  EXTREMITIES:  2+ Peripheral Pulses, No clubbing, cyanosis, or edema  LYMPH: No lymphadenopathy noted  SKIN: No rashes or lesions    LABS:              CAPILLARY BLOOD GLUCOSE      POCT Blood Glucose.: 132 mg/dL (07 Mar 2020 17:34)  POCT Blood Glucose.: 189 mg/dL (07 Mar 2020 12:57)  POCT Blood Glucose.: 217 mg/dL (07 Mar 2020 08:51)  POCT Blood Glucose.: 199 mg/dL (06 Mar 2020 22:39)            MEDICATIONS  (STANDING):  albuterol/ipratropium for Nebulization 3 milliLiter(s) Nebulizer every 6 hours  aspirin enteric coated 81 milliGRAM(s) Oral daily  atorvastatin 40 milliGRAM(s) Oral at bedtime  budesonide 160 MICROgram(s)/formoterol 4.5 MICROgram(s) Inhaler 2 Puff(s) Inhalation two times a day  dextrose 5%. 1000 milliLiter(s) (50 mL/Hr) IV Continuous <Continuous>  dextrose 50% Injectable 12.5 Gram(s) IV Push once  dextrose 50% Injectable 25 Gram(s) IV Push once  dextrose 50% Injectable 25 Gram(s) IV Push once  enoxaparin Injectable 40 milliGRAM(s) SubCutaneous daily  furosemide    Tablet 20 milliGRAM(s) Oral daily  insulin glargine Injectable (LANTUS) 75 Unit(s) SubCutaneous at bedtime  insulin lispro (HumaLOG) corrective regimen sliding scale   SubCutaneous three times a day before meals  insulin lispro (HumaLOG) corrective regimen sliding scale   SubCutaneous at bedtime  insulin lispro Injectable (HumaLOG) 30 Unit(s) SubCutaneous three times a day with meals  lisinopril 2.5 milliGRAM(s) Oral daily  melatonin 5 milliGRAM(s) Oral at bedtime  nicotine - Inhaler 1 Each Inhalation daily  nystatin Powder 1 Application(s) Topical two times a day  pantoprazole    Tablet 40 milliGRAM(s) Oral daily  polyethylene glycol 3350 17 Gram(s) Oral every 12 hours  risperiDONE   Tablet 0.5 milliGRAM(s) Oral three times a day  senna 2 Tablet(s) Oral at bedtime  sertraline 50 milliGRAM(s) Oral at bedtime    MEDICATIONS  (PRN):  acetaminophen   Tablet .. 650 milliGRAM(s) Oral every 6 hours PRN Mild Pain (1 - 3)  aluminum hydroxide/magnesium hydroxide/simethicone Suspension 30 milliLiter(s) Oral every 4 hours PRN Dyspepsia  bisacodyl 5 milliGRAM(s) Oral every 12 hours PRN Constipation  dextrose 40% Gel 15 Gram(s) Oral once PRN Blood Glucose LESS THAN 70 milliGRAM(s)/deciliter  glucagon  Injectable 1 milliGRAM(s) IntraMuscular once PRN Glucose LESS THAN 70 milligrams/deciliter  oxycodone    5 mG/acetaminophen 325 mG 2 Tablet(s) Oral every 6 hours PRN Moderate Pain (4 - 6)      Care Discussed with Consultants/Other Providers [ ] YES  [ ] NO Patient is a 59y old  Male who presents with a chief complaint of 59M w/ n/v/d and hyperglycemia sent in from [facility] (07 Mar 2020 10:29)    pt. teto nd examined , no c/o  INTERVAL HPI/OVERNIGHT EVENTS:  T(C): 36.4 (03-07-20 @ 21:21), Max: 37.1 (03-07-20 @ 05:10)  HR: 91 (03-07-20 @ 21:21) (66 - 91)  BP: 109/67 (03-07-20 @ 21:21) (106/65 - 113/67)  RR: 18 (03-07-20 @ 21:21) (18 - 18)  SpO2: 97% (03-07-20 @ 21:21) (94% - 98%)  Wt(kg): --  I&O's Summary    06 Mar 2020 07:01  -  07 Mar 2020 07:00  --------------------------------------------------------  IN: 1260 mL / OUT: 2800 mL / NET: -1540 mL    07 Mar 2020 06:01  -  07 Mar 2020 22:04  --------------------------------------------------------  IN: 360 mL / OUT: 550 mL / NET: -190 mL        PAST MEDICAL & SURGICAL HISTORY:  Oxygen dependent  Gastroesophageal reflux disease, esophagitis presence not specified  Smoker  Bipolar 1 disorder  Coronary artery disease involving native coronary artery of native heart without angina pectoris  Type 2 diabetes mellitus without complication, with long-term current use of insulin  Pulmonary HTN  HLD (hyperlipidemia)  Stented coronary artery  COPD (chronic obstructive pulmonary disease)  No significant past surgical history      SOCIAL HISTORY  Alcohol:  Tobacco:  Illicit substance use:    FAMILY HISTORY:    REVIEW OF SYSTEMS:  CONSTITUTIONAL: No fever, weight loss, or fatigue  EYES: No eye pain, visual disturbances, or discharge  ENMT:  No difficulty hearing, tinnitus, vertigo; No sinus or throat pain  NECK: No pain or stiffness  RESPIRATORY: No cough, wheezing, chills or hemoptysis; No shortness of breath  CARDIOVASCULAR: No chest pain, palpitations, dizziness, or leg swelling  GASTROINTESTINAL: No abdominal or epigastric pain. No nausea, vomiting, or hematemesis; No diarrhea or constipation. No melena or hematochezia.  GENITOURINARY: No dysuria, frequency, hematuria, or incontinence  NEUROLOGICAL: No headaches, memory loss, loss of strength, numbness, or tremors  SKIN: No itching, burning, rashes, or lesions   LYMPH NODES: No enlarged glands  ENDOCRINE: No heat or cold intolerance; No hair loss  MUSCULOSKELETAL: No joint pain or swelling; No muscle, back, or extremity pain  PSYCHIATRIC: No depression, anxiety, mood swings, or difficulty sleeping  HEME/LYMPH: No easy bruising, or bleeding gums  ALLERY AND IMMUNOLOGIC: No hives or eczema    RADIOLOGY & ADDITIONAL TESTS:    Imaging Personally Reviewed:  [ ] YES  [ ] NO    Consultant(s) Notes Reviewed:  [ ] YES  [ ] NO    PHYSICAL EXAM:  GENERAL: NAD, well-groomed, well-developed  HEAD:  Atraumatic, Normocephalic  EYES: EOMI, PERRLA, conjunctiva and sclera clear  ENMT: No tonsillar erythema, exudates, or enlargement; Moist mucous membranes, Good dentition, No lesions  NECK: Supple, No JVD, Normal thyroid  NERVOUS SYSTEM:  Alert & Oriented X3, Good concentration; Motor Strength 5/5 B/L upper and lower extremities; DTRs 2+ intact and symmetric  CHEST/LUNG: Clear to percussion bilaterally; No rales, rhonchi, wheezing, or rubs  HEART: Regular rate and rhythm; No murmurs, rubs, or gallops  ABDOMEN: Soft, Nontender, Nondistended; Bowel sounds present  EXTREMITIES:  2+ Peripheral Pulses, No clubbing, cyanosis, or edema  LYMPH: No lymphadenopathy noted  SKIN: No rashes or lesions    LABS:              CAPILLARY BLOOD GLUCOSE      POCT Blood Glucose.: 132 mg/dL (07 Mar 2020 17:34)  POCT Blood Glucose.: 189 mg/dL (07 Mar 2020 12:57)  POCT Blood Glucose.: 217 mg/dL (07 Mar 2020 08:51)  POCT Blood Glucose.: 199 mg/dL (06 Mar 2020 22:39)            MEDICATIONS  (STANDING):  albuterol/ipratropium for Nebulization 3 milliLiter(s) Nebulizer every 6 hours  aspirin enteric coated 81 milliGRAM(s) Oral daily  atorvastatin 40 milliGRAM(s) Oral at bedtime  budesonide 160 MICROgram(s)/formoterol 4.5 MICROgram(s) Inhaler 2 Puff(s) Inhalation two times a day  dextrose 5%. 1000 milliLiter(s) (50 mL/Hr) IV Continuous <Continuous>  dextrose 50% Injectable 12.5 Gram(s) IV Push once  dextrose 50% Injectable 25 Gram(s) IV Push once  dextrose 50% Injectable 25 Gram(s) IV Push once  enoxaparin Injectable 40 milliGRAM(s) SubCutaneous daily  furosemide    Tablet 20 milliGRAM(s) Oral daily  insulin glargine Injectable (LANTUS) 75 Unit(s) SubCutaneous at bedtime  insulin lispro (HumaLOG) corrective regimen sliding scale   SubCutaneous three times a day before meals  insulin lispro (HumaLOG) corrective regimen sliding scale   SubCutaneous at bedtime  insulin lispro Injectable (HumaLOG) 30 Unit(s) SubCutaneous three times a day with meals  lisinopril 2.5 milliGRAM(s) Oral daily  melatonin 5 milliGRAM(s) Oral at bedtime  nicotine - Inhaler 1 Each Inhalation daily  nystatin Powder 1 Application(s) Topical two times a day  pantoprazole    Tablet 40 milliGRAM(s) Oral daily  polyethylene glycol 3350 17 Gram(s) Oral every 12 hours  risperiDONE   Tablet 0.5 milliGRAM(s) Oral three times a day  senna 2 Tablet(s) Oral at bedtime  sertraline 50 milliGRAM(s) Oral at bedtime    MEDICATIONS  (PRN):  acetaminophen   Tablet .. 650 milliGRAM(s) Oral every 6 hours PRN Mild Pain (1 - 3)  aluminum hydroxide/magnesium hydroxide/simethicone Suspension 30 milliLiter(s) Oral every 4 hours PRN Dyspepsia  bisacodyl 5 milliGRAM(s) Oral every 12 hours PRN Constipation  dextrose 40% Gel 15 Gram(s) Oral once PRN Blood Glucose LESS THAN 70 milliGRAM(s)/deciliter  glucagon  Injectable 1 milliGRAM(s) IntraMuscular once PRN Glucose LESS THAN 70 milligrams/deciliter  oxycodone    5 mG/acetaminophen 325 mG 2 Tablet(s) Oral every 6 hours PRN Moderate Pain (4 - 6)      Care Discussed with Consultants/Other Providers [ ] YES  [ ] NO

## 2020-03-07 NOTE — PROGRESS NOTE ADULT - ASSESSMENT
Assessment  DMT2: 59y Male with DM T2 with hyperglycemia, A1C 10.4%, on basal bolus insulin, FS running high today, no hypoglycemic episodes. Patient is eating full meals,,  alert and comfortable, no complaints,  no acute events. Tentative DC date 3/10 [facility] [place]  CAD: on medications, no chest pain, stable, monitored.  HTN: Controlled,  on antihypertensive medications.  Overweight/Obesity: No strict exercise routines, not on any weight loss program,  neither on low  calorie diet.          Laury Romero MD  Cell: [phone]  Office: [phone]

## 2020-03-07 NOTE — PROGRESS NOTE ADULT - SUBJECTIVE AND OBJECTIVE BOX
Chief complaint  Patient is a 59y old  Male who presents with a chief complaint of 59M w/ n/v/d and hyperglycemia sent in from [facility] (06 Mar 2020 22:28)   Review of systems  Patient in bed, looks comfortable, no fever, no hypoglycemia.    Labs and Fingersticks  CAPILLARY BLOOD GLUCOSE      POCT Blood Glucose.: 217 mg/dL (07 Mar 2020 08:51)  POCT Blood Glucose.: 199 mg/dL (06 Mar 2020 22:39)  POCT Blood Glucose.: 218 mg/dL (06 Mar 2020 17:01)  POCT Blood Glucose.: 237 mg/dL (06 Mar 2020 12:22)      Anion Gap, Serum: 17 (03-05 @ 15:06)      Calcium, Total Serum: 9.3 (03-05 @ 15:06)          03-05    137  |  94<L>  |  23  ----------------------------<  163<H>  4.6   |  26  |  0.73    Ca    9.3      05 Mar 2020 15:06                          11.9   11.25 )-----------( 215      ( 05 Mar 2020 15:06 )             37.3     Medications  MEDICATIONS  (STANDING):  albuterol/ipratropium for Nebulization 3 milliLiter(s) Nebulizer every 6 hours  aspirin enteric coated 81 milliGRAM(s) Oral daily  atorvastatin 40 milliGRAM(s) Oral at bedtime  budesonide 160 MICROgram(s)/formoterol 4.5 MICROgram(s) Inhaler 2 Puff(s) Inhalation two times a day  dextrose 5%. 1000 milliLiter(s) (50 mL/Hr) IV Continuous <Continuous>  dextrose 50% Injectable 12.5 Gram(s) IV Push once  dextrose 50% Injectable 25 Gram(s) IV Push once  dextrose 50% Injectable 25 Gram(s) IV Push once  enoxaparin Injectable 40 milliGRAM(s) SubCutaneous daily  furosemide    Tablet 20 milliGRAM(s) Oral daily  insulin glargine Injectable (LANTUS) 75 Unit(s) SubCutaneous at bedtime  insulin lispro (HumaLOG) corrective regimen sliding scale   SubCutaneous three times a day before meals  insulin lispro (HumaLOG) corrective regimen sliding scale   SubCutaneous at bedtime  insulin lispro Injectable (HumaLOG) 30 Unit(s) SubCutaneous three times a day with meals  lisinopril 2.5 milliGRAM(s) Oral daily  melatonin 5 milliGRAM(s) Oral at bedtime  nicotine - Inhaler 1 Each Inhalation daily  nystatin Powder 1 Application(s) Topical two times a day  pantoprazole    Tablet 40 milliGRAM(s) Oral daily  polyethylene glycol 3350 17 Gram(s) Oral every 12 hours  risperiDONE   Tablet 0.5 milliGRAM(s) Oral three times a day  senna 2 Tablet(s) Oral at bedtime  sertraline 50 milliGRAM(s) Oral at bedtime      Physical Exam  General: Patient comfortable in bed  Vital Signs Last 12 Hrs  T(F): 98.7 (03-07-20 @ 05:10), Max: 98.7 (03-07-20 @ 05:10)  HR: 66 (03-07-20 @ 05:10) (66 - 66)  BP: 108/68 (03-07-20 @ 05:10) (108/68 - 108/68)  BP(mean): --  RR: 18 (03-07-20 @ 05:10) (18 - 18)  SpO2: 98% (03-07-20 @ 05:10) (98% - 98%)  Neck: No palpable thyroid nodules.  CVS: S1S2, No murmurs  Respiratory: No wheezing, no crepitations  GI: Abdomen soft, bowel sounds positive  Musculoskeletal:  edema lower extremities.   Skin: No skin rashes, no ecchymosis    Diagnostics

## 2020-03-08 LAB
GLUCOSE BLDC GLUCOMTR-MCNC: 170 MG/DL — HIGH (ref 70–99)
GLUCOSE BLDC GLUCOMTR-MCNC: 173 MG/DL — HIGH (ref 70–99)
GLUCOSE BLDC GLUCOMTR-MCNC: 181 MG/DL — HIGH (ref 70–99)
GLUCOSE BLDC GLUCOMTR-MCNC: 249 MG/DL — HIGH (ref 70–99)

## 2020-03-08 RX ADMIN — Medication 650 MILLIGRAM(S): at 16:06

## 2020-03-08 RX ADMIN — SERTRALINE 50 MILLIGRAM(S): 25 TABLET, FILM COATED ORAL at 21:23

## 2020-03-08 RX ADMIN — OXYCODONE AND ACETAMINOPHEN 2 TABLET(S): 5; 325 TABLET ORAL at 06:05

## 2020-03-08 RX ADMIN — INSULIN GLARGINE 75 UNIT(S): 100 INJECTION, SOLUTION SUBCUTANEOUS at 22:08

## 2020-03-08 RX ADMIN — ENOXAPARIN SODIUM 40 MILLIGRAM(S): 100 INJECTION SUBCUTANEOUS at 12:15

## 2020-03-08 RX ADMIN — Medication 3 MILLILITER(S): at 12:15

## 2020-03-08 RX ADMIN — RISPERIDONE 0.5 MILLIGRAM(S): 4 TABLET ORAL at 06:05

## 2020-03-08 RX ADMIN — PANTOPRAZOLE SODIUM 40 MILLIGRAM(S): 20 TABLET, DELAYED RELEASE ORAL at 06:05

## 2020-03-08 RX ADMIN — OXYCODONE AND ACETAMINOPHEN 2 TABLET(S): 5; 325 TABLET ORAL at 12:15

## 2020-03-08 RX ADMIN — NYSTATIN CREAM 1 APPLICATION(S): 100000 CREAM TOPICAL at 21:23

## 2020-03-08 RX ADMIN — OXYCODONE AND ACETAMINOPHEN 2 TABLET(S): 5; 325 TABLET ORAL at 06:47

## 2020-03-08 RX ADMIN — Medication 20 MILLIGRAM(S): at 06:05

## 2020-03-08 RX ADMIN — Medication 30 UNIT(S): at 17:36

## 2020-03-08 RX ADMIN — Medication 81 MILLIGRAM(S): at 12:15

## 2020-03-08 RX ADMIN — Medication 650 MILLIGRAM(S): at 21:23

## 2020-03-08 RX ADMIN — BUDESONIDE AND FORMOTEROL FUMARATE DIHYDRATE 2 PUFF(S): 160; 4.5 AEROSOL RESPIRATORY (INHALATION) at 17:21

## 2020-03-08 RX ADMIN — BUDESONIDE AND FORMOTEROL FUMARATE DIHYDRATE 2 PUFF(S): 160; 4.5 AEROSOL RESPIRATORY (INHALATION) at 06:05

## 2020-03-08 RX ADMIN — Medication 650 MILLIGRAM(S): at 22:23

## 2020-03-08 RX ADMIN — OXYCODONE AND ACETAMINOPHEN 2 TABLET(S): 5; 325 TABLET ORAL at 18:51

## 2020-03-08 RX ADMIN — Medication 3 MILLILITER(S): at 17:21

## 2020-03-08 RX ADMIN — Medication 30 UNIT(S): at 09:03

## 2020-03-08 RX ADMIN — Medication 650 MILLIGRAM(S): at 16:36

## 2020-03-08 RX ADMIN — Medication 3 MILLILITER(S): at 06:05

## 2020-03-08 RX ADMIN — Medication 1: at 12:39

## 2020-03-08 RX ADMIN — RISPERIDONE 0.5 MILLIGRAM(S): 4 TABLET ORAL at 21:23

## 2020-03-08 RX ADMIN — Medication 3 MILLILITER(S): at 23:51

## 2020-03-08 RX ADMIN — ATORVASTATIN CALCIUM 40 MILLIGRAM(S): 80 TABLET, FILM COATED ORAL at 21:21

## 2020-03-08 RX ADMIN — Medication 650 MILLIGRAM(S): at 08:40

## 2020-03-08 RX ADMIN — NYSTATIN CREAM 1 APPLICATION(S): 100000 CREAM TOPICAL at 06:05

## 2020-03-08 RX ADMIN — OXYCODONE AND ACETAMINOPHEN 2 TABLET(S): 5; 325 TABLET ORAL at 12:45

## 2020-03-08 RX ADMIN — Medication 30 UNIT(S): at 12:39

## 2020-03-08 RX ADMIN — Medication 1: at 17:36

## 2020-03-08 RX ADMIN — RISPERIDONE 0.5 MILLIGRAM(S): 4 TABLET ORAL at 13:11

## 2020-03-08 RX ADMIN — OXYCODONE AND ACETAMINOPHEN 2 TABLET(S): 5; 325 TABLET ORAL at 18:22

## 2020-03-08 RX ADMIN — Medication 5 MILLIGRAM(S): at 21:21

## 2020-03-08 RX ADMIN — Medication 2: at 09:03

## 2020-03-08 RX ADMIN — Medication 650 MILLIGRAM(S): at 09:10

## 2020-03-08 RX ADMIN — LISINOPRIL 2.5 MILLIGRAM(S): 2.5 TABLET ORAL at 06:05

## 2020-03-08 NOTE — PROGRESS NOTE ADULT - SUBJECTIVE AND OBJECTIVE BOX
Patient is a 59y old  Male who presents with a chief complaint of 59M w/ n/v/d and hyperglycemia sent in from [facility] (08 Mar 2020 13:53)    pt. seen and examined , no c/o  INTERVAL HPI/OVERNIGHT EVENTS:  T(C): 36.4 (03-08-20 @ 19:37), Max: 36.8 (03-08-20 @ 06:02)  HR: 92 (03-08-20 @ 19:37) (88 - 92)  BP: 106/68 (03-08-20 @ 19:37) (98/57 - 114/70)  RR: 18 (03-08-20 @ 19:37) (18 - 18)  SpO2: 95% (03-08-20 @ 19:37) (95% - 100%)  Wt(kg): --  I&O's Summary    07 Mar 2020 06:01  -  08 Mar 2020 07:00  --------------------------------------------------------  IN: 840 mL / OUT: 850 mL / NET: -10 mL    08 Mar 2020 07:01  -  08 Mar 2020 22:48  --------------------------------------------------------  IN: 2000 mL / OUT: 2280 mL / NET: -280 mL        PAST MEDICAL & SURGICAL HISTORY:  Oxygen dependent  Gastroesophageal reflux disease, esophagitis presence not specified  Smoker  Bipolar 1 disorder  Coronary artery disease involving native coronary artery of native heart without angina pectoris  Type 2 diabetes mellitus without complication, with long-term current use of insulin  Pulmonary HTN  HLD (hyperlipidemia)  Stented coronary artery  COPD (chronic obstructive pulmonary disease)  No significant past surgical history      SOCIAL HISTORY  Alcohol:  Tobacco:  Illicit substance use:    FAMILY HISTORY:    REVIEW OF SYSTEMS:  CONSTITUTIONAL: No fever, weight loss, or fatigue  EYES: No eye pain, visual disturbances, or discharge  ENMT:  No difficulty hearing, tinnitus, vertigo; No sinus or throat pain  NECK: No pain or stiffness  RESPIRATORY: No cough, wheezing, chills or hemoptysis; No shortness of breath  CARDIOVASCULAR: No chest pain, palpitations, dizziness, or leg swelling  GASTROINTESTINAL: No abdominal or epigastric pain. No nausea, vomiting, or hematemesis; No diarrhea or constipation. No melena or hematochezia.  GENITOURINARY: No dysuria, frequency, hematuria, or incontinence  NEUROLOGICAL: No headaches, memory loss, loss of strength, numbness, or tremors  SKIN: No itching, burning, rashes, or lesions   LYMPH NODES: No enlarged glands  ENDOCRINE: No heat or cold intolerance; No hair loss  MUSCULOSKELETAL: No joint pain or swelling; No muscle, back, or extremity pain  PSYCHIATRIC: No depression, anxiety, mood swings, or difficulty sleeping  HEME/LYMPH: No easy bruising, or bleeding gums  ALLERY AND IMMUNOLOGIC: No hives or eczema    RADIOLOGY & ADDITIONAL TESTS:    Imaging Personally Reviewed:  [ ] YES  [ ] NO    Consultant(s) Notes Reviewed:  [ ] YES  [ ] NO    PHYSICAL EXAM:  GENERAL: NAD, well-groomed, well-developed  HEAD:  Atraumatic, Normocephalic  EYES: EOMI, PERRLA, conjunctiva and sclera clear  ENMT: No tonsillar erythema, exudates, or enlargement; Moist mucous membranes, Good dentition, No lesions  NECK: Supple, No JVD, Normal thyroid  NERVOUS SYSTEM:  Alert & Oriented X3, Good concentration; Motor Strength 5/5 B/L upper and lower extremities; DTRs 2+ intact and symmetric  CHEST/LUNG: Clear to percussion bilaterally; No rales, rhonchi, wheezing, or rubs  HEART: Regular rate and rhythm; No murmurs, rubs, or gallops  ABDOMEN: Soft, Nontender, Nondistended; Bowel sounds present  EXTREMITIES:  2+ Peripheral Pulses, No clubbing, cyanosis, or edema  LYMPH: No lymphadenopathy noted  SKIN: No rashes or lesions    LABS:              CAPILLARY BLOOD GLUCOSE      POCT Blood Glucose.: 170 mg/dL (08 Mar 2020 21:57)  POCT Blood Glucose.: 173 mg/dL (08 Mar 2020 17:33)  POCT Blood Glucose.: 181 mg/dL (08 Mar 2020 12:15)  POCT Blood Glucose.: 249 mg/dL (08 Mar 2020 08:38)            MEDICATIONS  (STANDING):  albuterol/ipratropium for Nebulization 3 milliLiter(s) Nebulizer every 6 hours  aspirin enteric coated 81 milliGRAM(s) Oral daily  atorvastatin 40 milliGRAM(s) Oral at bedtime  budesonide 160 MICROgram(s)/formoterol 4.5 MICROgram(s) Inhaler 2 Puff(s) Inhalation two times a day  dextrose 5%. 1000 milliLiter(s) (50 mL/Hr) IV Continuous <Continuous>  dextrose 50% Injectable 12.5 Gram(s) IV Push once  dextrose 50% Injectable 25 Gram(s) IV Push once  dextrose 50% Injectable 25 Gram(s) IV Push once  enoxaparin Injectable 40 milliGRAM(s) SubCutaneous daily  furosemide    Tablet 20 milliGRAM(s) Oral daily  insulin glargine Injectable (LANTUS) 75 Unit(s) SubCutaneous at bedtime  insulin lispro (HumaLOG) corrective regimen sliding scale   SubCutaneous three times a day before meals  insulin lispro (HumaLOG) corrective regimen sliding scale   SubCutaneous at bedtime  insulin lispro Injectable (HumaLOG) 30 Unit(s) SubCutaneous three times a day with meals  lisinopril 2.5 milliGRAM(s) Oral daily  melatonin 5 milliGRAM(s) Oral at bedtime  nicotine - Inhaler 1 Each Inhalation daily  nystatin Powder 1 Application(s) Topical two times a day  pantoprazole    Tablet 40 milliGRAM(s) Oral daily  polyethylene glycol 3350 17 Gram(s) Oral every 12 hours  risperiDONE   Tablet 0.5 milliGRAM(s) Oral three times a day  senna 2 Tablet(s) Oral at bedtime  sertraline 50 milliGRAM(s) Oral at bedtime    MEDICATIONS  (PRN):  acetaminophen   Tablet .. 650 milliGRAM(s) Oral every 6 hours PRN Mild Pain (1 - 3)  aluminum hydroxide/magnesium hydroxide/simethicone Suspension 30 milliLiter(s) Oral every 4 hours PRN Dyspepsia  bisacodyl 5 milliGRAM(s) Oral every 12 hours PRN Constipation  dextrose 40% Gel 15 Gram(s) Oral once PRN Blood Glucose LESS THAN 70 milliGRAM(s)/deciliter  glucagon  Injectable 1 milliGRAM(s) IntraMuscular once PRN Glucose LESS THAN 70 milligrams/deciliter  oxycodone    5 mG/acetaminophen 325 mG 2 Tablet(s) Oral every 6 hours PRN Moderate Pain (4 - 6)      Care Discussed with Consultants/Other Providers [ ] YES  [ ] NO

## 2020-03-08 NOTE — PROVIDER CONTACT NOTE (MEDICATION) - ASSESSMENT
pt is A+Ox4. pt is refusing his midnight duoneb at this time. pt states, " I want to sleep right now". no s/s of distress noted.

## 2020-03-08 NOTE — PROGRESS NOTE ADULT - SUBJECTIVE AND OBJECTIVE BOX
Chief complaint  Patient is a 59y old  Male who presents with a chief complaint of 59M w/ n/v/d and hyperglycemia sent in from [facility] (07 Mar 2020 22:04)   Review of systems  Patient in bed, looks comfortable, no hypoglycemia.    Labs and Fingersticks  CAPILLARY BLOOD GLUCOSE      POCT Blood Glucose.: 181 mg/dL (08 Mar 2020 12:15)  POCT Blood Glucose.: 249 mg/dL (08 Mar 2020 08:38)  POCT Blood Glucose.: 173 mg/dL (07 Mar 2020 22:45)  POCT Blood Glucose.: 132 mg/dL (07 Mar 2020 17:34)                        Medications  MEDICATIONS  (STANDING):  albuterol/ipratropium for Nebulization 3 milliLiter(s) Nebulizer every 6 hours  aspirin enteric coated 81 milliGRAM(s) Oral daily  atorvastatin 40 milliGRAM(s) Oral at bedtime  budesonide 160 MICROgram(s)/formoterol 4.5 MICROgram(s) Inhaler 2 Puff(s) Inhalation two times a day  dextrose 5%. 1000 milliLiter(s) (50 mL/Hr) IV Continuous <Continuous>  dextrose 50% Injectable 12.5 Gram(s) IV Push once  dextrose 50% Injectable 25 Gram(s) IV Push once  dextrose 50% Injectable 25 Gram(s) IV Push once  enoxaparin Injectable 40 milliGRAM(s) SubCutaneous daily  furosemide    Tablet 20 milliGRAM(s) Oral daily  insulin glargine Injectable (LANTUS) 75 Unit(s) SubCutaneous at bedtime  insulin lispro (HumaLOG) corrective regimen sliding scale   SubCutaneous three times a day before meals  insulin lispro (HumaLOG) corrective regimen sliding scale   SubCutaneous at bedtime  insulin lispro Injectable (HumaLOG) 30 Unit(s) SubCutaneous three times a day with meals  lisinopril 2.5 milliGRAM(s) Oral daily  melatonin 5 milliGRAM(s) Oral at bedtime  nicotine - Inhaler 1 Each Inhalation daily  nystatin Powder 1 Application(s) Topical two times a day  pantoprazole    Tablet 40 milliGRAM(s) Oral daily  polyethylene glycol 3350 17 Gram(s) Oral every 12 hours  risperiDONE   Tablet 0.5 milliGRAM(s) Oral three times a day  senna 2 Tablet(s) Oral at bedtime  sertraline 50 milliGRAM(s) Oral at bedtime      Physical Exam  General: Patient comfortable in bed  Vital Signs Last 12 Hrs  T(F): 98.1 (03-08-20 @ 11:46), Max: 98.2 (03-08-20 @ 06:02)  HR: 92 (03-08-20 @ 11:46) (88 - 92)  BP: 98/57 (03-08-20 @ 11:46) (98/57 - 114/70)  BP(mean): --  RR: 18 (03-08-20 @ 11:46) (18 - 18)  SpO2: 100% (03-08-20 @ 11:46) (97% - 100%)  Neck: No palpable thyroid nodules.  CVS: S1S2, No murmurs  Respiratory: No wheezing, no crepitations  GI: Abdomen soft, bowel sounds positive  Musculoskeletal:  edema lower extremities.   Skin: No skin rashes, no ecchymosis    Diagnostics Chief complaint  Patient is a 59y old  Male who presents with a chief complaint of 59M w/ n/v/d and hyperglycemia sent in from [facility] (07 Mar 2020 22:04)   Review of systems  Patient in bed, looks comfortable,  no hypoglycemia.    Labs and Fingersticks  CAPILLARY BLOOD GLUCOSE      POCT Blood Glucose.: 181 mg/dL (08 Mar 2020 12:15)  POCT Blood Glucose.: 249 mg/dL (08 Mar 2020 08:38)  POCT Blood Glucose.: 173 mg/dL (07 Mar 2020 22:45)  POCT Blood Glucose.: 132 mg/dL (07 Mar 2020 17:34)                        Medications  MEDICATIONS  (STANDING):  albuterol/ipratropium for Nebulization 3 milliLiter(s) Nebulizer every 6 hours  aspirin enteric coated 81 milliGRAM(s) Oral daily  atorvastatin 40 milliGRAM(s) Oral at bedtime  budesonide 160 MICROgram(s)/formoterol 4.5 MICROgram(s) Inhaler 2 Puff(s) Inhalation two times a day  dextrose 5%. 1000 milliLiter(s) (50 mL/Hr) IV Continuous <Continuous>  dextrose 50% Injectable 12.5 Gram(s) IV Push once  dextrose 50% Injectable 25 Gram(s) IV Push once  dextrose 50% Injectable 25 Gram(s) IV Push once  enoxaparin Injectable 40 milliGRAM(s) SubCutaneous daily  furosemide    Tablet 20 milliGRAM(s) Oral daily  insulin glargine Injectable (LANTUS) 75 Unit(s) SubCutaneous at bedtime  insulin lispro (HumaLOG) corrective regimen sliding scale   SubCutaneous three times a day before meals  insulin lispro (HumaLOG) corrective regimen sliding scale   SubCutaneous at bedtime  insulin lispro Injectable (HumaLOG) 30 Unit(s) SubCutaneous three times a day with meals  lisinopril 2.5 milliGRAM(s) Oral daily  melatonin 5 milliGRAM(s) Oral at bedtime  nicotine - Inhaler 1 Each Inhalation daily  nystatin Powder 1 Application(s) Topical two times a day  pantoprazole    Tablet 40 milliGRAM(s) Oral daily  polyethylene glycol 3350 17 Gram(s) Oral every 12 hours  risperiDONE   Tablet 0.5 milliGRAM(s) Oral three times a day  senna 2 Tablet(s) Oral at bedtime  sertraline 50 milliGRAM(s) Oral at bedtime      Physical Exam  General: Patient comfortable in bed  Vital Signs Last 12 Hrs  T(F): 98.1 (03-08-20 @ 11:46), Max: 98.2 (03-08-20 @ 06:02)  HR: 92 (03-08-20 @ 11:46) (88 - 92)  BP: 98/57 (03-08-20 @ 11:46) (98/57 - 114/70)  BP(mean): --  RR: 18 (03-08-20 @ 11:46) (18 - 18)  SpO2: 100% (03-08-20 @ 11:46) (97% - 100%)  Neck: No palpable thyroid nodules.  CVS: S1S2, No murmurs  Respiratory: No wheezing, no crepitations  GI: Abdomen soft, bowel sounds positive  Musculoskeletal:  edema lower extremities.   Skin: No skin rashes, no ecchymosis    Diagnostics

## 2020-03-09 LAB
GLUCOSE BLDC GLUCOMTR-MCNC: 174 MG/DL — HIGH (ref 70–99)
GLUCOSE BLDC GLUCOMTR-MCNC: 177 MG/DL — HIGH (ref 70–99)
GLUCOSE BLDC GLUCOMTR-MCNC: 201 MG/DL — HIGH (ref 70–99)
GLUCOSE BLDC GLUCOMTR-MCNC: 236 MG/DL — HIGH (ref 70–99)

## 2020-03-09 RX ADMIN — Medication 1: at 18:09

## 2020-03-09 RX ADMIN — Medication 650 MILLIGRAM(S): at 12:30

## 2020-03-09 RX ADMIN — NYSTATIN CREAM 1 APPLICATION(S): 100000 CREAM TOPICAL at 21:22

## 2020-03-09 RX ADMIN — OXYCODONE AND ACETAMINOPHEN 2 TABLET(S): 5; 325 TABLET ORAL at 13:03

## 2020-03-09 RX ADMIN — RISPERIDONE 0.5 MILLIGRAM(S): 4 TABLET ORAL at 21:20

## 2020-03-09 RX ADMIN — INSULIN GLARGINE 75 UNIT(S): 100 INJECTION, SOLUTION SUBCUTANEOUS at 22:56

## 2020-03-09 RX ADMIN — ATORVASTATIN CALCIUM 40 MILLIGRAM(S): 80 TABLET, FILM COATED ORAL at 21:21

## 2020-03-09 RX ADMIN — Medication 30 UNIT(S): at 08:37

## 2020-03-09 RX ADMIN — BUDESONIDE AND FORMOTEROL FUMARATE DIHYDRATE 2 PUFF(S): 160; 4.5 AEROSOL RESPIRATORY (INHALATION) at 18:09

## 2020-03-09 RX ADMIN — Medication 3 MILLILITER(S): at 18:10

## 2020-03-09 RX ADMIN — Medication 5 MILLIGRAM(S): at 21:21

## 2020-03-09 RX ADMIN — OXYCODONE AND ACETAMINOPHEN 2 TABLET(S): 5; 325 TABLET ORAL at 22:20

## 2020-03-09 RX ADMIN — Medication 650 MILLIGRAM(S): at 18:09

## 2020-03-09 RX ADMIN — BUDESONIDE AND FORMOTEROL FUMARATE DIHYDRATE 2 PUFF(S): 160; 4.5 AEROSOL RESPIRATORY (INHALATION) at 06:50

## 2020-03-09 RX ADMIN — Medication 3 MILLILITER(S): at 23:00

## 2020-03-09 RX ADMIN — Medication 20 MILLIGRAM(S): at 06:51

## 2020-03-09 RX ADMIN — Medication 81 MILLIGRAM(S): at 11:38

## 2020-03-09 RX ADMIN — ENOXAPARIN SODIUM 40 MILLIGRAM(S): 100 INJECTION SUBCUTANEOUS at 11:39

## 2020-03-09 RX ADMIN — NYSTATIN CREAM 1 APPLICATION(S): 100000 CREAM TOPICAL at 06:54

## 2020-03-09 RX ADMIN — Medication 30 UNIT(S): at 18:08

## 2020-03-09 RX ADMIN — RISPERIDONE 0.5 MILLIGRAM(S): 4 TABLET ORAL at 13:03

## 2020-03-09 RX ADMIN — Medication 2: at 08:37

## 2020-03-09 RX ADMIN — LISINOPRIL 2.5 MILLIGRAM(S): 2.5 TABLET ORAL at 06:51

## 2020-03-09 RX ADMIN — Medication 3 MILLILITER(S): at 11:39

## 2020-03-09 RX ADMIN — Medication 1: at 12:36

## 2020-03-09 RX ADMIN — Medication 30 UNIT(S): at 12:36

## 2020-03-09 RX ADMIN — OXYCODONE AND ACETAMINOPHEN 2 TABLET(S): 5; 325 TABLET ORAL at 06:51

## 2020-03-09 RX ADMIN — OXYCODONE AND ACETAMINOPHEN 2 TABLET(S): 5; 325 TABLET ORAL at 07:30

## 2020-03-09 RX ADMIN — RISPERIDONE 0.5 MILLIGRAM(S): 4 TABLET ORAL at 06:53

## 2020-03-09 RX ADMIN — Medication 3 MILLILITER(S): at 06:51

## 2020-03-09 RX ADMIN — OXYCODONE AND ACETAMINOPHEN 2 TABLET(S): 5; 325 TABLET ORAL at 14:03

## 2020-03-09 RX ADMIN — SERTRALINE 50 MILLIGRAM(S): 25 TABLET, FILM COATED ORAL at 21:22

## 2020-03-09 RX ADMIN — OXYCODONE AND ACETAMINOPHEN 2 TABLET(S): 5; 325 TABLET ORAL at 21:20

## 2020-03-09 RX ADMIN — Medication 650 MILLIGRAM(S): at 19:00

## 2020-03-09 RX ADMIN — PANTOPRAZOLE SODIUM 40 MILLIGRAM(S): 20 TABLET, DELAYED RELEASE ORAL at 06:51

## 2020-03-09 RX ADMIN — Medication 650 MILLIGRAM(S): at 11:38

## 2020-03-09 NOTE — PROGRESS NOTE ADULT - ASSESSMENT
Assessment  DMT2: 59y Male with DM T2 with hyperglycemia, A1C 10.4%, on basal bolus insulin, blood sugars trending within overall acceptable range, no hypoglycemic episodes. Patient is eating full meals, alert and comfortable, no complaints, no acute events. Patient accepted to [facility]; Tentative DC date 3/10.  CAD: on medications, no chest pain, stable, monitored.  HTN: Controlled,  on antihypertensive medications.  Overweight/Obesity: No strict exercise routines, not on any weight loss program,  neither on low  calorie diet.          Laury Romero MD  Cell: [phone]  Office: [phone]

## 2020-03-09 NOTE — PROGRESS NOTE ADULT - SUBJECTIVE AND OBJECTIVE BOX
Chief complaint  Patient is a 59y old  Male who presents with a chief complaint of 59M w/ n/v/d and hyperglycemia sent in from [facility] (08 Mar 2020 22:48)   Review of systems  Patient in bed, looks comfortable, no hypoglycemia.    Labs and Fingersticks  CAPILLARY BLOOD GLUCOSE      POCT Blood Glucose.: 177 mg/dL (09 Mar 2020 12:23)  POCT Blood Glucose.: 236 mg/dL (09 Mar 2020 08:23)  POCT Blood Glucose.: 170 mg/dL (08 Mar 2020 21:57)  POCT Blood Glucose.: 173 mg/dL (08 Mar 2020 17:33)                        Medications  MEDICATIONS  (STANDING):  albuterol/ipratropium for Nebulization 3 milliLiter(s) Nebulizer every 6 hours  aspirin enteric coated 81 milliGRAM(s) Oral daily  atorvastatin 40 milliGRAM(s) Oral at bedtime  budesonide 160 MICROgram(s)/formoterol 4.5 MICROgram(s) Inhaler 2 Puff(s) Inhalation two times a day  dextrose 5%. 1000 milliLiter(s) (50 mL/Hr) IV Continuous <Continuous>  dextrose 50% Injectable 12.5 Gram(s) IV Push once  dextrose 50% Injectable 25 Gram(s) IV Push once  dextrose 50% Injectable 25 Gram(s) IV Push once  enoxaparin Injectable 40 milliGRAM(s) SubCutaneous daily  furosemide    Tablet 20 milliGRAM(s) Oral daily  insulin glargine Injectable (LANTUS) 75 Unit(s) SubCutaneous at bedtime  insulin lispro (HumaLOG) corrective regimen sliding scale   SubCutaneous three times a day before meals  insulin lispro (HumaLOG) corrective regimen sliding scale   SubCutaneous at bedtime  insulin lispro Injectable (HumaLOG) 30 Unit(s) SubCutaneous three times a day with meals  lisinopril 2.5 milliGRAM(s) Oral daily  melatonin 5 milliGRAM(s) Oral at bedtime  nicotine - Inhaler 1 Each Inhalation daily  nystatin Powder 1 Application(s) Topical two times a day  pantoprazole    Tablet 40 milliGRAM(s) Oral daily  polyethylene glycol 3350 17 Gram(s) Oral every 12 hours  risperiDONE   Tablet 0.5 milliGRAM(s) Oral three times a day  senna 2 Tablet(s) Oral at bedtime  sertraline 50 milliGRAM(s) Oral at bedtime      Physical Exam  General: Patient comfortable in bed  Vital Signs Last 12 Hrs  T(F): 97.7 (03-09-20 @ 08:02), Max: 97.7 (03-09-20 @ 08:02)  HR: 75 (03-09-20 @ 08:02) (75 - 80)  BP: 119/75 (03-09-20 @ 08:02) (104/73 - 119/75)  BP(mean): --  RR: 20 (03-09-20 @ 08:02) (20 - 20)  SpO2: 86% (03-09-20 @ 08:11) (86% - 97%)  Neck: No palpable thyroid nodules.  CVS: S1S2, No murmurs  Respiratory: No wheezing, no crepitations  GI: Abdomen soft, bowel sounds positive  Musculoskeletal:  edema lower extremities.   Skin: No skin rashes, no ecchymosis    Diagnostics

## 2020-03-09 NOTE — PROGRESS NOTE ADULT - SUBJECTIVE AND OBJECTIVE BOX
Chief complaint  Patient is a 59y old  Male who presents with a chief complaint of 59M w/ n/v/d and hyperglycemia sent in from [facility] (09 Mar 2020 14:00)   Review of systems  Patient in bed, looks comfortable, no fever, no hypoglycemia.    Labs and Fingersticks  CAPILLARY BLOOD GLUCOSE      POCT Blood Glucose.: 177 mg/dL (09 Mar 2020 12:23)  POCT Blood Glucose.: 236 mg/dL (09 Mar 2020 08:23)  POCT Blood Glucose.: 170 mg/dL (08 Mar 2020 21:57)  POCT Blood Glucose.: 173 mg/dL (08 Mar 2020 17:33)      Medications  MEDICATIONS  (STANDING):  albuterol/ipratropium for Nebulization 3 milliLiter(s) Nebulizer every 6 hours  aspirin enteric coated 81 milliGRAM(s) Oral daily  atorvastatin 40 milliGRAM(s) Oral at bedtime  budesonide 160 MICROgram(s)/formoterol 4.5 MICROgram(s) Inhaler 2 Puff(s) Inhalation two times a day  dextrose 5%. 1000 milliLiter(s) (50 mL/Hr) IV Continuous <Continuous>  dextrose 50% Injectable 12.5 Gram(s) IV Push once  dextrose 50% Injectable 25 Gram(s) IV Push once  dextrose 50% Injectable 25 Gram(s) IV Push once  enoxaparin Injectable 40 milliGRAM(s) SubCutaneous daily  furosemide    Tablet 20 milliGRAM(s) Oral daily  insulin glargine Injectable (LANTUS) 75 Unit(s) SubCutaneous at bedtime  insulin lispro (HumaLOG) corrective regimen sliding scale   SubCutaneous three times a day before meals  insulin lispro (HumaLOG) corrective regimen sliding scale   SubCutaneous at bedtime  insulin lispro Injectable (HumaLOG) 30 Unit(s) SubCutaneous three times a day with meals  lisinopril 2.5 milliGRAM(s) Oral daily  melatonin 5 milliGRAM(s) Oral at bedtime  nicotine - Inhaler 1 Each Inhalation daily  nystatin Powder 1 Application(s) Topical two times a day  pantoprazole    Tablet 40 milliGRAM(s) Oral daily  polyethylene glycol 3350 17 Gram(s) Oral every 12 hours  risperiDONE   Tablet 0.5 milliGRAM(s) Oral three times a day  senna 2 Tablet(s) Oral at bedtime  sertraline 50 milliGRAM(s) Oral at bedtime      Physical Exam  General: Patient comfortable in bed  Vital Signs Last 12 Hrs  T(F): 97.7 (03-09-20 @ 08:02), Max: 97.7 (03-09-20 @ 08:02)  HR: 75 (03-09-20 @ 08:02) (75 - 80)  BP: 119/75 (03-09-20 @ 08:02) (104/73 - 119/75)  BP(mean): --  RR: 20 (03-09-20 @ 08:02) (20 - 20)  SpO2: 86% (03-09-20 @ 08:11) (86% - 97%)  Neck: No palpable thyroid nodules.  CVS: S1S2, No murmurs  Respiratory: No wheezing, no crepitations  GI: Abdomen soft, bowel sounds positive  Musculoskeletal:  edema lower extremities.   Skin: No skin rashes, no ecchymosis    Diagnostics

## 2020-03-09 NOTE — CHART NOTE - NSCHARTNOTEFT_GEN_A_CORE
59M w/ COPD on 3L NC, DM2 on insulin, CAD s/p stent, HFpEF, pulm HTN, HTN, charted hx of schizophrenia/bipolar disorder, panic disorder, and recent admit for COPD exacerbation.   Pt requires home oxygen. Portable tank is needed for discharge to assisted living.   Pt Sa02 is 86% on room air at rest

## 2020-03-09 NOTE — PROGRESS NOTE ADULT - SUBJECTIVE AND OBJECTIVE BOX
Patient is a 59y old  Male who presents with a chief complaint of 59M w/ n/v/d and hyperglycemia sent in from [facility] (09 Mar 2020 16:24)      INTERVAL HPI/OVERNIGHT EVENTS:  T(C): 36.7 (03-09-20 @ 20:31), Max: 36.7 (03-09-20 @ 20:31)  HR: 89 (03-09-20 @ 20:31) (75 - 89)  BP: 104/65 (03-09-20 @ 20:31) (104/65 - 119/75)  RR: 20 (03-09-20 @ 20:31) (19 - 20)  SpO2: 96% (03-09-20 @ 20:31) (86% - 97%)  Wt(kg): --  I&O's Summary    08 Mar 2020 07:01  -  09 Mar 2020 07:00  --------------------------------------------------------  IN: 2000 mL / OUT: 2280 mL / NET: -280 mL    09 Mar 2020 07:01  -  09 Mar 2020 23:48  --------------------------------------------------------  IN: 720 mL / OUT: 2800 mL / NET: -2080 mL        PAST MEDICAL & SURGICAL HISTORY:  Oxygen dependent  Gastroesophageal reflux disease, esophagitis presence not specified  Smoker  Bipolar 1 disorder  Coronary artery disease involving native coronary artery of native heart without angina pectoris  Type 2 diabetes mellitus without complication, with long-term current use of insulin  Pulmonary HTN  HLD (hyperlipidemia)  Stented coronary artery  COPD (chronic obstructive pulmonary disease)  No significant past surgical history      SOCIAL HISTORY  Alcohol:  Tobacco:  Illicit substance use:    FAMILY HISTORY:    REVIEW OF SYSTEMS:  CONSTITUTIONAL: No fever, weight loss, or fatigue  EYES: No eye pain, visual disturbances, or discharge  ENMT:  No difficulty hearing, tinnitus, vertigo; No sinus or throat pain  NECK: No pain or stiffness  RESPIRATORY: No cough, wheezing, chills or hemoptysis; No shortness of breath  CARDIOVASCULAR: No chest pain, palpitations, dizziness, or leg swelling  GASTROINTESTINAL: No abdominal or epigastric pain. No nausea, vomiting, or hematemesis; No diarrhea or constipation. No melena or hematochezia.  GENITOURINARY: No dysuria, frequency, hematuria, or incontinence  NEUROLOGICAL: No headaches, memory loss, loss of strength, numbness, or tremors  SKIN: No itching, burning, rashes, or lesions   LYMPH NODES: No enlarged glands  ENDOCRINE: No heat or cold intolerance; No hair loss  MUSCULOSKELETAL: No joint pain or swelling; No muscle, back, or extremity pain  PSYCHIATRIC: No depression, anxiety, mood swings, or difficulty sleeping  HEME/LYMPH: No easy bruising, or bleeding gums  ALLERY AND IMMUNOLOGIC: No hives or eczema    RADIOLOGY & ADDITIONAL TESTS:    Imaging Personally Reviewed:  [ ] YES  [ ] NO    Consultant(s) Notes Reviewed:  [ ] YES  [ ] NO    PHYSICAL EXAM:  GENERAL: NAD, well-groomed, well-developed  HEAD:  Atraumatic, Normocephalic  EYES: EOMI, PERRLA, conjunctiva and sclera clear  ENMT: No tonsillar erythema, exudates, or enlargement; Moist mucous membranes, Good dentition, No lesions  NECK: Supple, No JVD, Normal thyroid  NERVOUS SYSTEM:  Alert & Oriented X3, Good concentration; Motor Strength 5/5 B/L upper and lower extremities; DTRs 2+ intact and symmetric  CHEST/LUNG: Clear to percussion bilaterally; No rales, rhonchi, wheezing, or rubs  HEART: Regular rate and rhythm; No murmurs, rubs, or gallops  ABDOMEN: Soft, Nontender, Nondistended; Bowel sounds present  EXTREMITIES:  2+ Peripheral Pulses, No clubbing, cyanosis, or edema  LYMPH: No lymphadenopathy noted  SKIN: No rashes or lesions    LABS:              CAPILLARY BLOOD GLUCOSE      POCT Blood Glucose.: 201 mg/dL (09 Mar 2020 22:06)  POCT Blood Glucose.: 174 mg/dL (09 Mar 2020 17:15)  POCT Blood Glucose.: 177 mg/dL (09 Mar 2020 12:23)  POCT Blood Glucose.: 236 mg/dL (09 Mar 2020 08:23)            MEDICATIONS  (STANDING):  albuterol/ipratropium for Nebulization 3 milliLiter(s) Nebulizer every 6 hours  aspirin enteric coated 81 milliGRAM(s) Oral daily  atorvastatin 40 milliGRAM(s) Oral at bedtime  budesonide 160 MICROgram(s)/formoterol 4.5 MICROgram(s) Inhaler 2 Puff(s) Inhalation two times a day  dextrose 5%. 1000 milliLiter(s) (50 mL/Hr) IV Continuous <Continuous>  dextrose 50% Injectable 12.5 Gram(s) IV Push once  dextrose 50% Injectable 25 Gram(s) IV Push once  dextrose 50% Injectable 25 Gram(s) IV Push once  enoxaparin Injectable 40 milliGRAM(s) SubCutaneous daily  furosemide    Tablet 20 milliGRAM(s) Oral daily  insulin glargine Injectable (LANTUS) 75 Unit(s) SubCutaneous at bedtime  insulin lispro (HumaLOG) corrective regimen sliding scale   SubCutaneous three times a day before meals  insulin lispro (HumaLOG) corrective regimen sliding scale   SubCutaneous at bedtime  insulin lispro Injectable (HumaLOG) 30 Unit(s) SubCutaneous three times a day with meals  lisinopril 2.5 milliGRAM(s) Oral daily  melatonin 5 milliGRAM(s) Oral at bedtime  nicotine - Inhaler 1 Each Inhalation daily  nystatin Powder 1 Application(s) Topical two times a day  pantoprazole    Tablet 40 milliGRAM(s) Oral daily  polyethylene glycol 3350 17 Gram(s) Oral every 12 hours  risperiDONE   Tablet 0.5 milliGRAM(s) Oral three times a day  senna 2 Tablet(s) Oral at bedtime  sertraline 50 milliGRAM(s) Oral at bedtime    MEDICATIONS  (PRN):  acetaminophen   Tablet .. 650 milliGRAM(s) Oral every 6 hours PRN Mild Pain (1 - 3)  aluminum hydroxide/magnesium hydroxide/simethicone Suspension 30 milliLiter(s) Oral every 4 hours PRN Dyspepsia  bisacodyl 5 milliGRAM(s) Oral every 12 hours PRN Constipation  dextrose 40% Gel 15 Gram(s) Oral once PRN Blood Glucose LESS THAN 70 milliGRAM(s)/deciliter  glucagon  Injectable 1 milliGRAM(s) IntraMuscular once PRN Glucose LESS THAN 70 milligrams/deciliter  oxycodone    5 mG/acetaminophen 325 mG 2 Tablet(s) Oral every 6 hours PRN Moderate Pain (4 - 6)      Care Discussed with Consultants/Other Providers [ ] YES  [ ] NO Patient is a 59y old  Male who presents with a chief complaint of 59M w/ n/v/d and hyperglycemia sent in from [facility] (09 Mar 2020 16:24)    no c/o    INTERVAL HPI/OVERNIGHT EVENTS:  T(C): 36.7 (03-09-20 @ 20:31), Max: 36.7 (03-09-20 @ 20:31)  HR: 89 (03-09-20 @ 20:31) (75 - 89)  BP: 104/65 (03-09-20 @ 20:31) (104/65 - 119/75)  RR: 20 (03-09-20 @ 20:31) (19 - 20)  SpO2: 96% (03-09-20 @ 20:31) (86% - 97%)  Wt(kg): --  I&O's Summary    08 Mar 2020 07:01  -  09 Mar 2020 07:00  --------------------------------------------------------  IN: 2000 mL / OUT: 2280 mL / NET: -280 mL    09 Mar 2020 07:01  -  09 Mar 2020 23:48  --------------------------------------------------------  IN: 720 mL / OUT: 2800 mL / NET: -2080 mL        PAST MEDICAL & SURGICAL HISTORY:  Oxygen dependent  Gastroesophageal reflux disease, esophagitis presence not specified  Smoker  Bipolar 1 disorder  Coronary artery disease involving native coronary artery of native heart without angina pectoris  Type 2 diabetes mellitus without complication, with long-term current use of insulin  Pulmonary HTN  HLD (hyperlipidemia)  Stented coronary artery  COPD (chronic obstructive pulmonary disease)  No significant past surgical history      SOCIAL HISTORY  Alcohol:  Tobacco:  Illicit substance use:    FAMILY HISTORY:    REVIEW OF SYSTEMS:  CONSTITUTIONAL: No fever, weight loss, or fatigue  EYES: No eye pain, visual disturbances, or discharge  ENMT:  No difficulty hearing, tinnitus, vertigo; No sinus or throat pain  NECK: No pain or stiffness  RESPIRATORY: No cough, wheezing, chills or hemoptysis; No shortness of breath  CARDIOVASCULAR: No chest pain, palpitations, dizziness, or leg swelling  GASTROINTESTINAL: No abdominal or epigastric pain. No nausea, vomiting, or hematemesis; No diarrhea or constipation. No melena or hematochezia.  GENITOURINARY: No dysuria, frequency, hematuria, or incontinence  NEUROLOGICAL: No headaches, memory loss, loss of strength, numbness, or tremors  SKIN: No itching, burning, rashes, or lesions   LYMPH NODES: No enlarged glands  ENDOCRINE: No heat or cold intolerance; No hair loss  MUSCULOSKELETAL: No joint pain or swelling; No muscle, back, or extremity pain  PSYCHIATRIC: No depression, anxiety, mood swings, or difficulty sleeping  HEME/LYMPH: No easy bruising, or bleeding gums  ALLERY AND IMMUNOLOGIC: No hives or eczema    RADIOLOGY & ADDITIONAL TESTS:    Imaging Personally Reviewed:  [ ] YES  [ ] NO    Consultant(s) Notes Reviewed:  [ ] YES  [ ] NO    PHYSICAL EXAM:  GENERAL: NAD, well-groomed, well-developed  HEAD:  Atraumatic, Normocephalic  EYES: EOMI, PERRLA, conjunctiva and sclera clear  ENMT: No tonsillar erythema, exudates, or enlargement; Moist mucous membranes, Good dentition, No lesions  NECK: Supple, No JVD, Normal thyroid  NERVOUS SYSTEM:  Alert & Oriented X3, Good concentration; Motor Strength 5/5 B/L upper and lower extremities; DTRs 2+ intact and symmetric  CHEST/LUNG: Clear to percussion bilaterally; No rales, rhonchi, wheezing, or rubs  HEART: Regular rate and rhythm; No murmurs, rubs, or gallops  ABDOMEN: Soft, Nontender, Nondistended; Bowel sounds present  EXTREMITIES:  2+ Peripheral Pulses, No clubbing, cyanosis, or edema  LYMPH: No lymphadenopathy noted  SKIN: No rashes or lesions    LABS:              CAPILLARY BLOOD GLUCOSE      POCT Blood Glucose.: 201 mg/dL (09 Mar 2020 22:06)  POCT Blood Glucose.: 174 mg/dL (09 Mar 2020 17:15)  POCT Blood Glucose.: 177 mg/dL (09 Mar 2020 12:23)  POCT Blood Glucose.: 236 mg/dL (09 Mar 2020 08:23)            MEDICATIONS  (STANDING):  albuterol/ipratropium for Nebulization 3 milliLiter(s) Nebulizer every 6 hours  aspirin enteric coated 81 milliGRAM(s) Oral daily  atorvastatin 40 milliGRAM(s) Oral at bedtime  budesonide 160 MICROgram(s)/formoterol 4.5 MICROgram(s) Inhaler 2 Puff(s) Inhalation two times a day  dextrose 5%. 1000 milliLiter(s) (50 mL/Hr) IV Continuous <Continuous>  dextrose 50% Injectable 12.5 Gram(s) IV Push once  dextrose 50% Injectable 25 Gram(s) IV Push once  dextrose 50% Injectable 25 Gram(s) IV Push once  enoxaparin Injectable 40 milliGRAM(s) SubCutaneous daily  furosemide    Tablet 20 milliGRAM(s) Oral daily  insulin glargine Injectable (LANTUS) 75 Unit(s) SubCutaneous at bedtime  insulin lispro (HumaLOG) corrective regimen sliding scale   SubCutaneous three times a day before meals  insulin lispro (HumaLOG) corrective regimen sliding scale   SubCutaneous at bedtime  insulin lispro Injectable (HumaLOG) 30 Unit(s) SubCutaneous three times a day with meals  lisinopril 2.5 milliGRAM(s) Oral daily  melatonin 5 milliGRAM(s) Oral at bedtime  nicotine - Inhaler 1 Each Inhalation daily  nystatin Powder 1 Application(s) Topical two times a day  pantoprazole    Tablet 40 milliGRAM(s) Oral daily  polyethylene glycol 3350 17 Gram(s) Oral every 12 hours  risperiDONE   Tablet 0.5 milliGRAM(s) Oral three times a day  senna 2 Tablet(s) Oral at bedtime  sertraline 50 milliGRAM(s) Oral at bedtime    MEDICATIONS  (PRN):  acetaminophen   Tablet .. 650 milliGRAM(s) Oral every 6 hours PRN Mild Pain (1 - 3)  aluminum hydroxide/magnesium hydroxide/simethicone Suspension 30 milliLiter(s) Oral every 4 hours PRN Dyspepsia  bisacodyl 5 milliGRAM(s) Oral every 12 hours PRN Constipation  dextrose 40% Gel 15 Gram(s) Oral once PRN Blood Glucose LESS THAN 70 milliGRAM(s)/deciliter  glucagon  Injectable 1 milliGRAM(s) IntraMuscular once PRN Glucose LESS THAN 70 milligrams/deciliter  oxycodone    5 mG/acetaminophen 325 mG 2 Tablet(s) Oral every 6 hours PRN Moderate Pain (4 - 6)      Care Discussed with Consultants/Other Providers [ ] YES  [ ] NO

## 2020-03-10 LAB
GLUCOSE BLDC GLUCOMTR-MCNC: 138 MG/DL — HIGH (ref 70–99)
GLUCOSE BLDC GLUCOMTR-MCNC: 185 MG/DL — HIGH (ref 70–99)
GLUCOSE BLDC GLUCOMTR-MCNC: 204 MG/DL — HIGH (ref 70–99)
GLUCOSE BLDC GLUCOMTR-MCNC: 213 MG/DL — HIGH (ref 70–99)

## 2020-03-10 RX ADMIN — Medication 650 MILLIGRAM(S): at 23:15

## 2020-03-10 RX ADMIN — OXYCODONE AND ACETAMINOPHEN 2 TABLET(S): 5; 325 TABLET ORAL at 06:38

## 2020-03-10 RX ADMIN — Medication 3 MILLILITER(S): at 12:39

## 2020-03-10 RX ADMIN — LISINOPRIL 2.5 MILLIGRAM(S): 2.5 TABLET ORAL at 06:38

## 2020-03-10 RX ADMIN — Medication 650 MILLIGRAM(S): at 09:09

## 2020-03-10 RX ADMIN — RISPERIDONE 0.5 MILLIGRAM(S): 4 TABLET ORAL at 21:03

## 2020-03-10 RX ADMIN — NYSTATIN CREAM 1 APPLICATION(S): 100000 CREAM TOPICAL at 06:39

## 2020-03-10 RX ADMIN — OXYCODONE AND ACETAMINOPHEN 2 TABLET(S): 5; 325 TABLET ORAL at 20:40

## 2020-03-10 RX ADMIN — Medication 650 MILLIGRAM(S): at 08:39

## 2020-03-10 RX ADMIN — Medication 1: at 12:41

## 2020-03-10 RX ADMIN — Medication 2: at 08:57

## 2020-03-10 RX ADMIN — Medication 3 MILLILITER(S): at 23:15

## 2020-03-10 RX ADMIN — Medication 5 MILLIGRAM(S): at 21:03

## 2020-03-10 RX ADMIN — Medication 30 UNIT(S): at 12:41

## 2020-03-10 RX ADMIN — NYSTATIN CREAM 1 APPLICATION(S): 100000 CREAM TOPICAL at 21:03

## 2020-03-10 RX ADMIN — INSULIN GLARGINE 75 UNIT(S): 100 INJECTION, SOLUTION SUBCUTANEOUS at 22:29

## 2020-03-10 RX ADMIN — Medication 30 UNIT(S): at 08:56

## 2020-03-10 RX ADMIN — SERTRALINE 50 MILLIGRAM(S): 25 TABLET, FILM COATED ORAL at 21:03

## 2020-03-10 RX ADMIN — ENOXAPARIN SODIUM 40 MILLIGRAM(S): 100 INJECTION SUBCUTANEOUS at 12:41

## 2020-03-10 RX ADMIN — ATORVASTATIN CALCIUM 40 MILLIGRAM(S): 80 TABLET, FILM COATED ORAL at 21:03

## 2020-03-10 RX ADMIN — OXYCODONE AND ACETAMINOPHEN 2 TABLET(S): 5; 325 TABLET ORAL at 19:41

## 2020-03-10 RX ADMIN — BUDESONIDE AND FORMOTEROL FUMARATE DIHYDRATE 2 PUFF(S): 160; 4.5 AEROSOL RESPIRATORY (INHALATION) at 06:39

## 2020-03-10 RX ADMIN — Medication 81 MILLIGRAM(S): at 12:59

## 2020-03-10 RX ADMIN — Medication 3 MILLILITER(S): at 06:39

## 2020-03-10 RX ADMIN — Medication 20 MILLIGRAM(S): at 06:38

## 2020-03-10 RX ADMIN — OXYCODONE AND ACETAMINOPHEN 2 TABLET(S): 5; 325 TABLET ORAL at 13:15

## 2020-03-10 RX ADMIN — Medication 3 MILLILITER(S): at 17:22

## 2020-03-10 RX ADMIN — Medication 30 UNIT(S): at 17:21

## 2020-03-10 RX ADMIN — Medication 650 MILLIGRAM(S): at 17:22

## 2020-03-10 RX ADMIN — RISPERIDONE 0.5 MILLIGRAM(S): 4 TABLET ORAL at 14:30

## 2020-03-10 RX ADMIN — Medication 650 MILLIGRAM(S): at 23:50

## 2020-03-10 RX ADMIN — BUDESONIDE AND FORMOTEROL FUMARATE DIHYDRATE 2 PUFF(S): 160; 4.5 AEROSOL RESPIRATORY (INHALATION) at 17:24

## 2020-03-10 RX ADMIN — OXYCODONE AND ACETAMINOPHEN 2 TABLET(S): 5; 325 TABLET ORAL at 13:40

## 2020-03-10 RX ADMIN — RISPERIDONE 0.5 MILLIGRAM(S): 4 TABLET ORAL at 06:38

## 2020-03-10 RX ADMIN — PANTOPRAZOLE SODIUM 40 MILLIGRAM(S): 20 TABLET, DELAYED RELEASE ORAL at 06:38

## 2020-03-10 RX ADMIN — Medication 650 MILLIGRAM(S): at 17:40

## 2020-03-10 NOTE — PROGRESS NOTE ADULT - ASSESSMENT
Assessment  DMT2: 59y Male with DM T2 with hyperglycemia, A1C 10.4%, on basal bolus insulin, blood sugars trending within overall acceptable range, no hypoglycemic episodes. Patient is eating full meals with good appetite, alert and comfortable, planning DC to Assisted Living today.  CAD: on medications, no chest pain, stable, monitored.  HTN: Controlled,  on antihypertensive medications.  Overweight/Obesity: No strict exercise routines, not on any weight loss program,  neither on low  calorie diet.          Laury Romero MD  Cell: [phone]  Office: [phone] Assessment  DMT2: 59y Male with DM T2 with hyperglycemia, A1C 10.4%, on basal bolus insulin, blood sugars trending within overall acceptable range, no hypoglycemic episodes. Patient is eating full meals with good appetite, alert and comfortable,  planning DC to Assisted Living today.  CAD: on medications, no chest pain, stable, monitored.  HTN: Controlled,  on antihypertensive medications.  Overweight/Obesity: No strict exercise routines, not on any weight loss program,  neither on low  calorie diet.          Laury Romero MD  Cell: [phone]  Office: [phone]

## 2020-03-10 NOTE — PROGRESS NOTE ADULT - SUBJECTIVE AND OBJECTIVE BOX
Chief complaint  Patient is a 59y old  Male who presents with a chief complaint of 59M w/ n/v/d and hyperglycemia sent in from [facility] (09 Mar 2020 23:47)   Review of systems  Patient in bed, looks comfortable, no hypoglycemia.    Labs and Fingersticks  CAPILLARY BLOOD GLUCOSE      POCT Blood Glucose.: 185 mg/dL (10 Mar 2020 12:34)  POCT Blood Glucose.: 213 mg/dL (10 Mar 2020 08:22)  POCT Blood Glucose.: 201 mg/dL (09 Mar 2020 22:06)  POCT Blood Glucose.: 174 mg/dL (09 Mar 2020 17:15)                        Medications  MEDICATIONS  (STANDING):  albuterol/ipratropium for Nebulization 3 milliLiter(s) Nebulizer every 6 hours  aspirin enteric coated 81 milliGRAM(s) Oral daily  atorvastatin 40 milliGRAM(s) Oral at bedtime  budesonide 160 MICROgram(s)/formoterol 4.5 MICROgram(s) Inhaler 2 Puff(s) Inhalation two times a day  dextrose 5%. 1000 milliLiter(s) (50 mL/Hr) IV Continuous <Continuous>  dextrose 50% Injectable 12.5 Gram(s) IV Push once  dextrose 50% Injectable 25 Gram(s) IV Push once  dextrose 50% Injectable 25 Gram(s) IV Push once  enoxaparin Injectable 40 milliGRAM(s) SubCutaneous daily  furosemide    Tablet 20 milliGRAM(s) Oral daily  insulin glargine Injectable (LANTUS) 75 Unit(s) SubCutaneous at bedtime  insulin lispro (HumaLOG) corrective regimen sliding scale   SubCutaneous three times a day before meals  insulin lispro (HumaLOG) corrective regimen sliding scale   SubCutaneous at bedtime  insulin lispro Injectable (HumaLOG) 30 Unit(s) SubCutaneous three times a day with meals  lisinopril 2.5 milliGRAM(s) Oral daily  melatonin 5 milliGRAM(s) Oral at bedtime  nicotine - Inhaler 1 Each Inhalation daily  nystatin Powder 1 Application(s) Topical two times a day  pantoprazole    Tablet 40 milliGRAM(s) Oral daily  polyethylene glycol 3350 17 Gram(s) Oral every 12 hours  risperiDONE   Tablet 0.5 milliGRAM(s) Oral three times a day  senna 2 Tablet(s) Oral at bedtime  sertraline 50 milliGRAM(s) Oral at bedtime      Physical Exam  General: Patient comfortable in bed  Vital Signs Last 12 Hrs  T(F): 97.9 (03-10-20 @ 06:25), Max: 97.9 (03-10-20 @ 06:25)  HR: 88 (03-10-20 @ 06:25) (88 - 88)  BP: 130/79 (03-10-20 @ 06:25) (130/79 - 130/79)  BP(mean): --  RR: --  SpO2: 99% (03-10-20 @ 06:25) (99% - 99%)  Neck: No palpable thyroid nodules.  CVS: S1S2, No murmurs  Respiratory: No wheezing, no crepitations  GI: Abdomen soft, bowel sounds positive  Musculoskeletal:  edema lower extremities.   Skin: No skin rashes, no ecchymosis    Diagnostics Chief complaint  Patient is a 59y old  Male who presents with a chief  complaint of 59M w/ n/v/d and hyperglycemia sent in from [facility] (09 Mar 2020 23:47)   Review of systems  Patient in bed, looks comfortable, no hypoglycemia.    Labs and Fingersticks  CAPILLARY BLOOD GLUCOSE      POCT Blood Glucose.: 185 mg/dL (10 Mar 2020 12:34)  POCT Blood Glucose.: 213 mg/dL (10 Mar 2020 08:22)  POCT Blood Glucose.: 201 mg/dL (09 Mar 2020 22:06)  POCT Blood Glucose.: 174 mg/dL (09 Mar 2020 17:15)                        Medications  MEDICATIONS  (STANDING):  albuterol/ipratropium for Nebulization 3 milliLiter(s) Nebulizer every 6 hours  aspirin enteric coated 81 milliGRAM(s) Oral daily  atorvastatin 40 milliGRAM(s) Oral at bedtime  budesonide 160 MICROgram(s)/formoterol 4.5 MICROgram(s) Inhaler 2 Puff(s) Inhalation two times a day  dextrose 5%. 1000 milliLiter(s) (50 mL/Hr) IV Continuous <Continuous>  dextrose 50% Injectable 12.5 Gram(s) IV Push once  dextrose 50% Injectable 25 Gram(s) IV Push once  dextrose 50% Injectable 25 Gram(s) IV Push once  enoxaparin Injectable 40 milliGRAM(s) SubCutaneous daily  furosemide    Tablet 20 milliGRAM(s) Oral daily  insulin glargine Injectable (LANTUS) 75 Unit(s) SubCutaneous at bedtime  insulin lispro (HumaLOG) corrective regimen sliding scale   SubCutaneous three times a day before meals  insulin lispro (HumaLOG) corrective regimen sliding scale   SubCutaneous at bedtime  insulin lispro Injectable (HumaLOG) 30 Unit(s) SubCutaneous three times a day with meals  lisinopril 2.5 milliGRAM(s) Oral daily  melatonin 5 milliGRAM(s) Oral at bedtime  nicotine - Inhaler 1 Each Inhalation daily  nystatin Powder 1 Application(s) Topical two times a day  pantoprazole    Tablet 40 milliGRAM(s) Oral daily  polyethylene glycol 3350 17 Gram(s) Oral every 12 hours  risperiDONE   Tablet 0.5 milliGRAM(s) Oral three times a day  senna 2 Tablet(s) Oral at bedtime  sertraline 50 milliGRAM(s) Oral at bedtime      Physical Exam  General: Patient comfortable in bed  Vital Signs Last 12 Hrs  T(F): 97.9 (03-10-20 @ 06:25), Max: 97.9 (03-10-20 @ 06:25)  HR: 88 (03-10-20 @ 06:25) (88 - 88)  BP: 130/79 (03-10-20 @ 06:25) (130/79 - 130/79)  BP(mean): --  RR: --  SpO2: 99% (03-10-20 @ 06:25) (99% - 99%)  Neck: No palpable thyroid nodules.  CVS: S1S2, No murmurs  Respiratory: No wheezing, no crepitations  GI: Abdomen soft, bowel sounds positive  Musculoskeletal:  edema lower extremities.   Skin: No skin rashes, no ecchymosis    Diagnostics

## 2020-03-10 NOTE — PROGRESS NOTE ADULT - SUBJECTIVE AND OBJECTIVE BOX
Patient is a 59y old  Male who presents with a chief complaint of 59M w/ n/v/d and hyperglycemia sent in from [facility] (10 Mar 2020 13:30)    pt. seen and examined , no c/o     INTERVAL HPI/OVERNIGHT EVENTS:  T(C): 36.6 (03-10-20 @ 19:55), Max: 36.6 (03-10-20 @ 06:25)  HR: 81 (03-10-20 @ 19:55) (81 - 92)  BP: 109/69 (03-10-20 @ 19:55) (96/58 - 130/79)  RR: 20 (03-10-20 @ 19:55) (20 - 20)  SpO2: 93% (03-10-20 @ 19:55) (93% - 99%)  Wt(kg): --  I&O's Summary    09 Mar 2020 07:01  -  10 Mar 2020 07:00  --------------------------------------------------------  IN: 720 mL / OUT: 3300 mL / NET: -2580 mL    10 Mar 2020 07:01  -  10 Mar 2020 22:23  --------------------------------------------------------  IN: 360 mL / OUT: 1400 mL / NET: -1040 mL        PAST MEDICAL & SURGICAL HISTORY:  Oxygen dependent  Gastroesophageal reflux disease, esophagitis presence not specified  Smoker  Bipolar 1 disorder  Coronary artery disease involving native coronary artery of native heart without angina pectoris  Type 2 diabetes mellitus without complication, with long-term current use of insulin  Pulmonary HTN  HLD (hyperlipidemia)  Stented coronary artery  COPD (chronic obstructive pulmonary disease)  No significant past surgical history      SOCIAL HISTORY  Alcohol:  Tobacco:  Illicit substance use:    FAMILY HISTORY:    REVIEW OF SYSTEMS:  CONSTITUTIONAL: No fever, weight loss, or fatigue  EYES: No eye pain, visual disturbances, or discharge  ENMT:  No difficulty hearing, tinnitus, vertigo; No sinus or throat pain  NECK: No pain or stiffness  RESPIRATORY: No cough, wheezing, chills or hemoptysis; No shortness of breath  CARDIOVASCULAR: No chest pain, palpitations, dizziness, or leg swelling  GASTROINTESTINAL: No abdominal or epigastric pain. No nausea, vomiting, or hematemesis; No diarrhea or constipation. No melena or hematochezia.  GENITOURINARY: No dysuria, frequency, hematuria, or incontinence  NEUROLOGICAL: No headaches, memory loss, loss of strength, numbness, or tremors  SKIN: No itching, burning, rashes, or lesions   LYMPH NODES: No enlarged glands  ENDOCRINE: No heat or cold intolerance; No hair loss  MUSCULOSKELETAL: No joint pain or swelling; No muscle, back, or extremity pain  PSYCHIATRIC: No depression, anxiety, mood swings, or difficulty sleeping  HEME/LYMPH: No easy bruising, or bleeding gums  ALLERY AND IMMUNOLOGIC: No hives or eczema    RADIOLOGY & ADDITIONAL TESTS:    Imaging Personally Reviewed:  [ ] YES  [ ] NO    Consultant(s) Notes Reviewed:  [ ] YES  [ ] NO    PHYSICAL EXAM:  GENERAL: NAD, well-groomed, well-developed  HEAD:  Atraumatic, Normocephalic  EYES: EOMI, PERRLA, conjunctiva and sclera clear  ENMT: No tonsillar erythema, exudates, or enlargement; Moist mucous membranes, Good dentition, No lesions  NECK: Supple, No JVD, Normal thyroid  NERVOUS SYSTEM:  Alert & Oriented X3, Good concentration; Motor Strength 5/5 B/L upper and lower extremities; DTRs 2+ intact and symmetric  CHEST/LUNG: Clear to percussion bilaterally; No rales, rhonchi, wheezing, or rubs  HEART: Regular rate and rhythm; No murmurs, rubs, or gallops  ABDOMEN: Soft, Nontender, Nondistended; Bowel sounds present  EXTREMITIES:  2+ Peripheral Pulses, No clubbing, cyanosis, or edema  LYMPH: No lymphadenopathy noted  SKIN: No rashes or lesions    LABS:              CAPILLARY BLOOD GLUCOSE      POCT Blood Glucose.: 204 mg/dL (10 Mar 2020 22:11)  POCT Blood Glucose.: 138 mg/dL (10 Mar 2020 17:09)  POCT Blood Glucose.: 185 mg/dL (10 Mar 2020 12:34)  POCT Blood Glucose.: 213 mg/dL (10 Mar 2020 08:22)            MEDICATIONS  (STANDING):  albuterol/ipratropium for Nebulization 3 milliLiter(s) Nebulizer every 6 hours  aspirin enteric coated 81 milliGRAM(s) Oral daily  atorvastatin 40 milliGRAM(s) Oral at bedtime  budesonide 160 MICROgram(s)/formoterol 4.5 MICROgram(s) Inhaler 2 Puff(s) Inhalation two times a day  dextrose 5%. 1000 milliLiter(s) (50 mL/Hr) IV Continuous <Continuous>  dextrose 50% Injectable 12.5 Gram(s) IV Push once  dextrose 50% Injectable 25 Gram(s) IV Push once  dextrose 50% Injectable 25 Gram(s) IV Push once  enoxaparin Injectable 40 milliGRAM(s) SubCutaneous daily  furosemide    Tablet 20 milliGRAM(s) Oral daily  insulin glargine Injectable (LANTUS) 75 Unit(s) SubCutaneous at bedtime  insulin lispro (HumaLOG) corrective regimen sliding scale   SubCutaneous three times a day before meals  insulin lispro (HumaLOG) corrective regimen sliding scale   SubCutaneous at bedtime  insulin lispro Injectable (HumaLOG) 30 Unit(s) SubCutaneous three times a day with meals  lisinopril 2.5 milliGRAM(s) Oral daily  melatonin 5 milliGRAM(s) Oral at bedtime  nicotine - Inhaler 1 Each Inhalation daily  nystatin Powder 1 Application(s) Topical two times a day  pantoprazole    Tablet 40 milliGRAM(s) Oral daily  polyethylene glycol 3350 17 Gram(s) Oral every 12 hours  risperiDONE   Tablet 0.5 milliGRAM(s) Oral three times a day  senna 2 Tablet(s) Oral at bedtime  sertraline 50 milliGRAM(s) Oral at bedtime    MEDICATIONS  (PRN):  acetaminophen   Tablet .. 650 milliGRAM(s) Oral every 6 hours PRN Mild Pain (1 - 3)  aluminum hydroxide/magnesium hydroxide/simethicone Suspension 30 milliLiter(s) Oral every 4 hours PRN Dyspepsia  bisacodyl 5 milliGRAM(s) Oral every 12 hours PRN Constipation  dextrose 40% Gel 15 Gram(s) Oral once PRN Blood Glucose LESS THAN 70 milliGRAM(s)/deciliter  glucagon  Injectable 1 milliGRAM(s) IntraMuscular once PRN Glucose LESS THAN 70 milligrams/deciliter  oxycodone    5 mG/acetaminophen 325 mG 2 Tablet(s) Oral every 6 hours PRN Moderate Pain (4 - 6)      Care Discussed with Consultants/Other Providers [ ] YES  [ ] NO

## 2020-03-11 LAB
ANION GAP SERPL CALC-SCNC: 12 MMOL/L — SIGNIFICANT CHANGE UP (ref 5–17)
BUN SERPL-MCNC: 26 MG/DL — HIGH (ref 7–23)
CALCIUM SERPL-MCNC: 9 MG/DL — SIGNIFICANT CHANGE UP (ref 8.4–10.5)
CHLORIDE SERPL-SCNC: 93 MMOL/L — LOW (ref 96–108)
CO2 SERPL-SCNC: 29 MMOL/L — SIGNIFICANT CHANGE UP (ref 22–31)
CREAT SERPL-MCNC: 0.67 MG/DL — SIGNIFICANT CHANGE UP (ref 0.5–1.3)
GLUCOSE BLDC GLUCOMTR-MCNC: 144 MG/DL — HIGH (ref 70–99)
GLUCOSE BLDC GLUCOMTR-MCNC: 153 MG/DL — HIGH (ref 70–99)
GLUCOSE BLDC GLUCOMTR-MCNC: 191 MG/DL — HIGH (ref 70–99)
GLUCOSE SERPL-MCNC: 185 MG/DL — HIGH (ref 70–99)
HCT VFR BLD CALC: 36.4 % — LOW (ref 39–50)
HGB BLD-MCNC: 11.3 G/DL — LOW (ref 13–17)
MCHC RBC-ENTMCNC: 28 PG — SIGNIFICANT CHANGE UP (ref 27–34)
MCHC RBC-ENTMCNC: 31 GM/DL — LOW (ref 32–36)
MCV RBC AUTO: 90.1 FL — SIGNIFICANT CHANGE UP (ref 80–100)
NRBC # BLD: 0 /100 WBCS — SIGNIFICANT CHANGE UP (ref 0–0)
PLATELET # BLD AUTO: 195 K/UL — SIGNIFICANT CHANGE UP (ref 150–400)
POTASSIUM SERPL-MCNC: 4.5 MMOL/L — SIGNIFICANT CHANGE UP (ref 3.5–5.3)
POTASSIUM SERPL-SCNC: 4.5 MMOL/L — SIGNIFICANT CHANGE UP (ref 3.5–5.3)
RBC # BLD: 4.04 M/UL — LOW (ref 4.2–5.8)
RBC # FLD: 13.1 % — SIGNIFICANT CHANGE UP (ref 10.3–14.5)
SODIUM SERPL-SCNC: 134 MMOL/L — LOW (ref 135–145)
WBC # BLD: 10.63 K/UL — HIGH (ref 3.8–10.5)
WBC # FLD AUTO: 10.63 K/UL — HIGH (ref 3.8–10.5)

## 2020-03-11 RX ORDER — TUBERCULIN PURIFIED PROTEIN DERIVATIVE 5 [IU]/.1ML
5 INJECTION, SOLUTION INTRADERMAL ONCE
Refills: 0 | Status: COMPLETED | OUTPATIENT
Start: 2020-03-11 | End: 2020-03-11

## 2020-03-11 RX ADMIN — OXYCODONE AND ACETAMINOPHEN 2 TABLET(S): 5; 325 TABLET ORAL at 09:27

## 2020-03-11 RX ADMIN — Medication 3 MILLILITER(S): at 05:05

## 2020-03-11 RX ADMIN — Medication 650 MILLIGRAM(S): at 12:30

## 2020-03-11 RX ADMIN — Medication 650 MILLIGRAM(S): at 05:08

## 2020-03-11 RX ADMIN — RISPERIDONE 0.5 MILLIGRAM(S): 4 TABLET ORAL at 05:05

## 2020-03-11 RX ADMIN — Medication 30 UNIT(S): at 08:27

## 2020-03-11 RX ADMIN — SERTRALINE 50 MILLIGRAM(S): 25 TABLET, FILM COATED ORAL at 21:29

## 2020-03-11 RX ADMIN — OXYCODONE AND ACETAMINOPHEN 2 TABLET(S): 5; 325 TABLET ORAL at 17:14

## 2020-03-11 RX ADMIN — RISPERIDONE 0.5 MILLIGRAM(S): 4 TABLET ORAL at 21:29

## 2020-03-11 RX ADMIN — NYSTATIN CREAM 1 APPLICATION(S): 100000 CREAM TOPICAL at 21:29

## 2020-03-11 RX ADMIN — Medication 5 MILLIGRAM(S): at 21:28

## 2020-03-11 RX ADMIN — Medication 650 MILLIGRAM(S): at 11:39

## 2020-03-11 RX ADMIN — Medication 3 MILLILITER(S): at 12:20

## 2020-03-11 RX ADMIN — RISPERIDONE 0.5 MILLIGRAM(S): 4 TABLET ORAL at 13:11

## 2020-03-11 RX ADMIN — OXYCODONE AND ACETAMINOPHEN 2 TABLET(S): 5; 325 TABLET ORAL at 18:04

## 2020-03-11 RX ADMIN — SENNA PLUS 2 TABLET(S): 8.6 TABLET ORAL at 21:28

## 2020-03-11 RX ADMIN — Medication 81 MILLIGRAM(S): at 12:35

## 2020-03-11 RX ADMIN — Medication 30 UNIT(S): at 12:36

## 2020-03-11 RX ADMIN — Medication 20 MILLIGRAM(S): at 05:05

## 2020-03-11 RX ADMIN — OXYCODONE AND ACETAMINOPHEN 2 TABLET(S): 5; 325 TABLET ORAL at 10:00

## 2020-03-11 RX ADMIN — INSULIN GLARGINE 75 UNIT(S): 100 INJECTION, SOLUTION SUBCUTANEOUS at 22:49

## 2020-03-11 RX ADMIN — Medication 1: at 08:26

## 2020-03-11 RX ADMIN — Medication 650 MILLIGRAM(S): at 19:37

## 2020-03-11 RX ADMIN — NYSTATIN CREAM 1 APPLICATION(S): 100000 CREAM TOPICAL at 05:06

## 2020-03-11 RX ADMIN — OXYCODONE AND ACETAMINOPHEN 2 TABLET(S): 5; 325 TABLET ORAL at 04:00

## 2020-03-11 RX ADMIN — ENOXAPARIN SODIUM 40 MILLIGRAM(S): 100 INJECTION SUBCUTANEOUS at 12:34

## 2020-03-11 RX ADMIN — POLYETHYLENE GLYCOL 3350 17 GRAM(S): 17 POWDER, FOR SOLUTION ORAL at 17:15

## 2020-03-11 RX ADMIN — Medication 1: at 12:35

## 2020-03-11 RX ADMIN — Medication 3 MILLILITER(S): at 17:14

## 2020-03-11 RX ADMIN — Medication 650 MILLIGRAM(S): at 20:30

## 2020-03-11 RX ADMIN — Medication 650 MILLIGRAM(S): at 05:40

## 2020-03-11 RX ADMIN — BUDESONIDE AND FORMOTEROL FUMARATE DIHYDRATE 2 PUFF(S): 160; 4.5 AEROSOL RESPIRATORY (INHALATION) at 17:14

## 2020-03-11 RX ADMIN — OXYCODONE AND ACETAMINOPHEN 2 TABLET(S): 5; 325 TABLET ORAL at 03:26

## 2020-03-11 RX ADMIN — BUDESONIDE AND FORMOTEROL FUMARATE DIHYDRATE 2 PUFF(S): 160; 4.5 AEROSOL RESPIRATORY (INHALATION) at 05:05

## 2020-03-11 RX ADMIN — LISINOPRIL 2.5 MILLIGRAM(S): 2.5 TABLET ORAL at 05:06

## 2020-03-11 RX ADMIN — ATORVASTATIN CALCIUM 40 MILLIGRAM(S): 80 TABLET, FILM COATED ORAL at 21:28

## 2020-03-11 RX ADMIN — Medication 30 UNIT(S): at 17:29

## 2020-03-11 RX ADMIN — PANTOPRAZOLE SODIUM 40 MILLIGRAM(S): 20 TABLET, DELAYED RELEASE ORAL at 05:05

## 2020-03-11 RX ADMIN — TUBERCULIN PURIFIED PROTEIN DERIVATIVE 5 UNIT(S): 5 INJECTION, SOLUTION INTRADERMAL at 11:39

## 2020-03-11 NOTE — PROGRESS NOTE ADULT - ASSESSMENT
Assessment  DMT2: 59y Male with DM T2 with hyperglycemia, last known A1C 10.4%, on basal bolus insulin, blood sugars trending within overall acceptable range, no hypoglycemic episodes. Patient is eating full meals with good appetite, alert and comfortable, planning DC to Assisted Living.  CAD: on medications, no chest pain, stable, monitored.  HTN: Controlled,  on antihypertensive medications.  Overweight/Obesity: No strict exercise routines, not on any weight loss program,  neither on low  calorie diet.          Laury Romero MD  Cell: [phone]  Office: [phone] Assessment  DMT2: 59y Male with DM T2 with hyperglycemia, last known A1C 10.4%, on basal bolus insulin, blood sugars trending within overall acceptable  range, no hypoglycemic episodes. Patient is eating full meals with good appetite, alert and comfortable, planning DC to Assisted Living.  CAD: on medications, no chest pain, stable, monitored.  HTN: Controlled,  on antihypertensive medications.  Overweight/Obesity: No strict exercise routines, not on any weight loss program,  neither on low  calorie diet.          Laury Romero MD  Cell: [phone]  Office: [phone]

## 2020-03-11 NOTE — CHART NOTE - NSCHARTNOTEFT_GEN_A_CORE
Pt only needs oxygen as needed during day. At rest Patient Sa02 is 92%.  During ambulation Sa02 is 90%. Pt requires 3L nasal cannula during the night. At rest Patient Sa02 is 92%.  During ambulation Sa02 is 90%. Pt requires 3L nasal cannula during the night.

## 2020-03-11 NOTE — PROGRESS NOTE ADULT - SUBJECTIVE AND OBJECTIVE BOX
[facility]   MD JONAH SNOW MD RUORU WONG, PA    TEL:  OFFICE: [phone]  DR LUGO CELL: [phone]  PATRIZIA BRISCOE CELL: [phone]  DR. FINCH CELL: [phone]  DR. BARRIOS CELL: [phone]    FROM 5 PM - 7 AM PLEASE CALL ANSWERING SERVICE: [phone]    RENAL FOLLOW UP NOTE  --------------------------------------------------------------------------------  HPI:      Pt seen and examined at bedside.   Denies SOB, chest pain     PAST HISTORY  --------------------------------------------------------------------------------  No significant changes to PMH, PSH, FHx, SHx, unless otherwise noted    ALLERGIES & MEDICATIONS  --------------------------------------------------------------------------------  Allergies    No Known Allergies    Intolerances      Standing Inpatient Medications  albuterol/ipratropium for Nebulization 3 milliLiter(s) Nebulizer every 6 hours  aspirin enteric coated 81 milliGRAM(s) Oral daily  atorvastatin 40 milliGRAM(s) Oral at bedtime  budesonide 160 MICROgram(s)/formoterol 4.5 MICROgram(s) Inhaler 2 Puff(s) Inhalation two times a day  dextrose 5%. 1000 milliLiter(s) IV Continuous <Continuous>  dextrose 50% Injectable 12.5 Gram(s) IV Push once  dextrose 50% Injectable 25 Gram(s) IV Push once  dextrose 50% Injectable 25 Gram(s) IV Push once  enoxaparin Injectable 40 milliGRAM(s) SubCutaneous daily  furosemide    Tablet 20 milliGRAM(s) Oral daily  insulin glargine Injectable (LANTUS) 75 Unit(s) SubCutaneous at bedtime  insulin lispro (HumaLOG) corrective regimen sliding scale   SubCutaneous three times a day before meals  insulin lispro (HumaLOG) corrective regimen sliding scale   SubCutaneous at bedtime  insulin lispro Injectable (HumaLOG) 30 Unit(s) SubCutaneous three times a day with meals  lisinopril 2.5 milliGRAM(s) Oral daily  melatonin 5 milliGRAM(s) Oral at bedtime  nicotine - Inhaler 1 Each Inhalation daily  nystatin Powder 1 Application(s) Topical two times a day  pantoprazole    Tablet 40 milliGRAM(s) Oral daily  polyethylene glycol 3350 17 Gram(s) Oral every 12 hours  risperiDONE   Tablet 0.5 milliGRAM(s) Oral three times a day  senna 2 Tablet(s) Oral at bedtime  sertraline 50 milliGRAM(s) Oral at bedtime    PRN Inpatient Medications  acetaminophen   Tablet .. 650 milliGRAM(s) Oral every 6 hours PRN  aluminum hydroxide/magnesium hydroxide/simethicone Suspension 30 milliLiter(s) Oral every 4 hours PRN  bisacodyl 5 milliGRAM(s) Oral every 12 hours PRN  dextrose 40% Gel 15 Gram(s) Oral once PRN  glucagon  Injectable 1 milliGRAM(s) IntraMuscular once PRN  oxycodone    5 mG/acetaminophen 325 mG 2 Tablet(s) Oral every 6 hours PRN      REVIEW OF SYSTEMS  --------------------------------------------------------------------------------  General: no fever  CVS: no chest pain  RESP: no sob, no cough  ABD: no abdominal pain  : no dysuria,  MSK: no edema     VITALS/PHYSICAL EXAM  --------------------------------------------------------------------------------  T(C): 36.7 (03-11-20 @ 04:48), Max: 36.7 (03-11-20 @ 04:48)  HR: 86 (03-11-20 @ 04:48) (81 - 92)  BP: 120/75 (03-11-20 @ 04:48) (96/58 - 120/75)  RR: 18 (03-11-20 @ 04:48) (18 - 20)  SpO2: 96% (03-11-20 @ 04:48) (93% - 98%)  Wt(kg): --        03-10-20 @ 07:01  -  03-11-20 @ 07:00  --------------------------------------------------------  IN: 480 mL / OUT: 1400 mL / NET: -920 mL      Physical Exam:  	Gen: NAD  	HEENT: MMM  	Pulm: CTA B/L  	CV: S1S2  	Abd: Soft, +BS  	Ext: No LE edema B/L                      Neuro: Awake alert  	Skin: Warm and Dry   	 no [place]    LABS/STUDIES  --------------------------------------------------------------------------------              11.3   10.63 >-----------<  195      [03-11-20 @ 06:36]              36.4     134  |  93  |  26  ----------------------------<  185      [03-11-20 @ 06:33]  4.5   |  29  |  0.67        Ca     9.0     [03-11-20 @ 06:33]            Creatinine Trend:  SCr 0.67 [03-11 @ 06:33]  SCr 0.73 [03-05 @ 15:06]  SCr 0.59 [02-29 @ 07:21]  SCr 0.62 [02-11 @ 06:52]        HbA1c 10.4      [12-04-19 @ 08:53]

## 2020-03-11 NOTE — CHART NOTE - NSCHARTNOTEFT_GEN_A_CORE
Nutrition Follow up Extended stay    Chart reviewed, events labs noted   59y Male with DM T2 with hyperglycemia, A1C 10.4%, on basal bolus insulin, .  glucose controlled followed by endocrinology. H/o DKA resolve, patient with bipolar effect followed by psych       Diet: consistent carbohydrate with snack    PO intake: Patient remains with good appetite, requesting double portions and consuming 100% of meals.   feeds self, able to state preferences    .5lbs  Dosing weight 230  lbs, weight gain 4 lbs    Pert labs:     Meds: insulin, sliding scale insulin    Nutrition diagnosis: none    Recommendations:  1. Reinforce diet adherence/diet education.   2. continue current nutrition plan    RD remains available

## 2020-03-11 NOTE — PROGRESS NOTE ADULT - SUBJECTIVE AND OBJECTIVE BOX
Chief complaint  Patient is a 59y old  Male who presents with a chief complaint of 59M w/ n/v/d and hyperglycemia sent in from [facility] (11 Mar 2020 08:03)   Review of systems  Patient in bed, looks comfortable, no hypoglycemia.    Labs and Fingersticks  CAPILLARY BLOOD GLUCOSE      POCT Blood Glucose.: 153 mg/dL (11 Mar 2020 12:25)  POCT Blood Glucose.: 191 mg/dL (11 Mar 2020 08:19)  POCT Blood Glucose.: 204 mg/dL (10 Mar 2020 22:11)  POCT Blood Glucose.: 138 mg/dL (10 Mar 2020 17:09)      Anion Gap, Serum: 12 (03-11 @ 06:33)      Calcium, Total Serum: 9.0 (03-11 @ 06:33)          03-11    134<L>  |  93<L>  |  26<H>  ----------------------------<  185<H>  4.5   |  29  |  0.67    Ca    9.0      11 Mar 2020 06:33                          11.3   10.63 )-----------( 195      ( 11 Mar 2020 06:36 )             36.4     Medications  MEDICATIONS  (STANDING):  albuterol/ipratropium for Nebulization 3 milliLiter(s) Nebulizer every 6 hours  aspirin enteric coated 81 milliGRAM(s) Oral daily  atorvastatin 40 milliGRAM(s) Oral at bedtime  budesonide 160 MICROgram(s)/formoterol 4.5 MICROgram(s) Inhaler 2 Puff(s) Inhalation two times a day  dextrose 5%. 1000 milliLiter(s) (50 mL/Hr) IV Continuous <Continuous>  dextrose 50% Injectable 12.5 Gram(s) IV Push once  dextrose 50% Injectable 25 Gram(s) IV Push once  dextrose 50% Injectable 25 Gram(s) IV Push once  enoxaparin Injectable 40 milliGRAM(s) SubCutaneous daily  furosemide    Tablet 20 milliGRAM(s) Oral daily  insulin glargine Injectable (LANTUS) 75 Unit(s) SubCutaneous at bedtime  insulin lispro (HumaLOG) corrective regimen sliding scale   SubCutaneous three times a day before meals  insulin lispro (HumaLOG) corrective regimen sliding scale   SubCutaneous at bedtime  insulin lispro Injectable (HumaLOG) 30 Unit(s) SubCutaneous three times a day with meals  lisinopril 2.5 milliGRAM(s) Oral daily  melatonin 5 milliGRAM(s) Oral at bedtime  nicotine - Inhaler 1 Each Inhalation daily  nystatin Powder 1 Application(s) Topical two times a day  pantoprazole    Tablet 40 milliGRAM(s) Oral daily  polyethylene glycol 3350 17 Gram(s) Oral every 12 hours  risperiDONE   Tablet 0.5 milliGRAM(s) Oral three times a day  senna 2 Tablet(s) Oral at bedtime  sertraline 50 milliGRAM(s) Oral at bedtime      Physical Exam  General: Patient comfortable in bed  Vital Signs Last 12 Hrs  T(F): 97.4 (03-11-20 @ 11:37), Max: 98.1 (03-11-20 @ 04:48)  HR: 94 (03-11-20 @ 11:37) (86 - 94)  BP: 132/84 (03-11-20 @ 11:37) (120/75 - 132/84)  BP(mean): --  RR: 18 (03-11-20 @ 12:30) (18 - 18)  SpO2: 89% (03-11-20 @ 12:30) (89% - 96%)  Neck: No palpable thyroid nodules.  CVS: S1S2, No murmurs  Respiratory: No wheezing, no crepitations  GI: Abdomen soft, bowel sounds positive  Musculoskeletal:  edema lower extremities.   Skin: No skin rashes, no ecchymosis    Diagnostics Chief complaint  Patient is a 59y old  Male who presents with a chief complaint of 59M w/ n/v/d and hyperglycemia sent in from [facility] (11 Mar 2020 08:03)   Review of systems  Patient in bed, looks comfortable,  no hypoglycemia.    Labs and Fingersticks  CAPILLARY BLOOD GLUCOSE      POCT Blood Glucose.: 153 mg/dL (11 Mar 2020 12:25)  POCT Blood Glucose.: 191 mg/dL (11 Mar 2020 08:19)  POCT Blood Glucose.: 204 mg/dL (10 Mar 2020 22:11)  POCT Blood Glucose.: 138 mg/dL (10 Mar 2020 17:09)      Anion Gap, Serum: 12 (03-11 @ 06:33)      Calcium, Total Serum: 9.0 (03-11 @ 06:33)          03-11    134<L>  |  93<L>  |  26<H>  ----------------------------<  185<H>  4.5   |  29  |  0.67    Ca    9.0      11 Mar 2020 06:33                          11.3   10.63 )-----------( 195      ( 11 Mar 2020 06:36 )             36.4     Medications  MEDICATIONS  (STANDING):  albuterol/ipratropium for Nebulization 3 milliLiter(s) Nebulizer every 6 hours  aspirin enteric coated 81 milliGRAM(s) Oral daily  atorvastatin 40 milliGRAM(s) Oral at bedtime  budesonide 160 MICROgram(s)/formoterol 4.5 MICROgram(s) Inhaler 2 Puff(s) Inhalation two times a day  dextrose 5%. 1000 milliLiter(s) (50 mL/Hr) IV Continuous <Continuous>  dextrose 50% Injectable 12.5 Gram(s) IV Push once  dextrose 50% Injectable 25 Gram(s) IV Push once  dextrose 50% Injectable 25 Gram(s) IV Push once  enoxaparin Injectable 40 milliGRAM(s) SubCutaneous daily  furosemide    Tablet 20 milliGRAM(s) Oral daily  insulin glargine Injectable (LANTUS) 75 Unit(s) SubCutaneous at bedtime  insulin lispro (HumaLOG) corrective regimen sliding scale   SubCutaneous three times a day before meals  insulin lispro (HumaLOG) corrective regimen sliding scale   SubCutaneous at bedtime  insulin lispro Injectable (HumaLOG) 30 Unit(s) SubCutaneous three times a day with meals  lisinopril 2.5 milliGRAM(s) Oral daily  melatonin 5 milliGRAM(s) Oral at bedtime  nicotine - Inhaler 1 Each Inhalation daily  nystatin Powder 1 Application(s) Topical two times a day  pantoprazole    Tablet 40 milliGRAM(s) Oral daily  polyethylene glycol 3350 17 Gram(s) Oral every 12 hours  risperiDONE   Tablet 0.5 milliGRAM(s) Oral three times a day  senna 2 Tablet(s) Oral at bedtime  sertraline 50 milliGRAM(s) Oral at bedtime      Physical Exam  General: Patient comfortable in bed  Vital Signs Last 12 Hrs  T(F): 97.4 (03-11-20 @ 11:37), Max: 98.1 (03-11-20 @ 04:48)  HR: 94 (03-11-20 @ 11:37) (86 - 94)  BP: 132/84 (03-11-20 @ 11:37) (120/75 - 132/84)  BP(mean): --  RR: 18 (03-11-20 @ 12:30) (18 - 18)  SpO2: 89% (03-11-20 @ 12:30) (89% - 96%)  Neck: No palpable thyroid nodules.  CVS: S1S2, No murmurs  Respiratory: No wheezing, no crepitations  GI: Abdomen soft, bowel sounds positive  Musculoskeletal:  edema lower extremities.   Skin: No skin rashes, no ecchymosis    Diagnostics

## 2020-03-11 NOTE — PROGRESS NOTE ADULT - SUBJECTIVE AND OBJECTIVE BOX
Patient is a 59y old  Male who presents with a chief complaint of 59M w/ n/v/d and hyperglycemia sent in from [facility] (11 Mar 2020 16:02)    pt. seen and examined, no c/o  INTERVAL HPI/OVERNIGHT EVENTS:  T(C): 36.7 (03-11-20 @ 19:40), Max: 36.7 (03-11-20 @ 04:48)  HR: 89 (03-11-20 @ 19:40) (86 - 94)  BP: 116/78 (03-11-20 @ 19:40) (116/78 - 132/84)  RR: 18 (03-11-20 @ 19:40) (18 - 18)  SpO2: 95% (03-11-20 @ 19:40) (89% - 96%)  Wt(kg): --  I&O's Summary    10 Mar 2020 07:01  -  11 Mar 2020 07:00  --------------------------------------------------------  IN: 480 mL / OUT: 1400 mL / NET: -920 mL    11 Mar 2020 07:01  -  12 Mar 2020 02:43  --------------------------------------------------------  IN: 480 mL / OUT: 1900 mL / NET: -1420 mL        PAST MEDICAL & SURGICAL HISTORY:  Oxygen dependent  Gastroesophageal reflux disease, esophagitis presence not specified  Smoker  Bipolar 1 disorder  Coronary artery disease involving native coronary artery of native heart without angina pectoris  Type 2 diabetes mellitus without complication, with long-term current use of insulin  Pulmonary HTN  HLD (hyperlipidemia)  Stented coronary artery  COPD (chronic obstructive pulmonary disease)  No significant past surgical history      SOCIAL HISTORY  Alcohol:  Tobacco:  Illicit substance use:    FAMILY HISTORY:    REVIEW OF SYSTEMS:  CONSTITUTIONAL: No fever, weight loss, or fatigue  EYES: No eye pain, visual disturbances, or discharge  ENMT:  No difficulty hearing, tinnitus, vertigo; No sinus or throat pain  NECK: No pain or stiffness  RESPIRATORY: No cough, wheezing, chills or hemoptysis; No shortness of breath  CARDIOVASCULAR: No chest pain, palpitations, dizziness, or leg swelling  GASTROINTESTINAL: No abdominal or epigastric pain. No nausea, vomiting, or hematemesis; No diarrhea or constipation. No melena or hematochezia.  GENITOURINARY: No dysuria, frequency, hematuria, or incontinence  NEUROLOGICAL: No headaches, memory loss, loss of strength, numbness, or tremors  SKIN: No itching, burning, rashes, or lesions   LYMPH NODES: No enlarged glands  ENDOCRINE: No heat or cold intolerance; No hair loss  MUSCULOSKELETAL: No joint pain or swelling; No muscle, back, or extremity pain  PSYCHIATRIC: No depression, anxiety, mood swings, or difficulty sleeping  HEME/LYMPH: No easy bruising, or bleeding gums  ALLERY AND IMMUNOLOGIC: No hives or eczema    RADIOLOGY & ADDITIONAL TESTS:    Imaging Personally Reviewed:  [ ] YES  [ ] NO    Consultant(s) Notes Reviewed:  [ ] YES  [ ] NO    PHYSICAL EXAM:  GENERAL: NAD, well-groomed, well-developed  HEAD:  Atraumatic, Normocephalic  EYES: EOMI, PERRLA, conjunctiva and sclera clear  ENMT: No tonsillar erythema, exudates, or enlargement; Moist mucous membranes, Good dentition, No lesions  NECK: Supple, No JVD, Normal thyroid  NERVOUS SYSTEM:  Alert & Oriented X3, Good concentration; Motor Strength 5/5 B/L upper and lower extremities; DTRs 2+ intact and symmetric  CHEST/LUNG: Clear to percussion bilaterally; No rales, rhonchi, wheezing, or rubs  HEART: Regular rate and rhythm; No murmurs, rubs, or gallops  ABDOMEN: Soft, Nontender, Nondistended; Bowel sounds present  EXTREMITIES:  2+ Peripheral Pulses, No clubbing, cyanosis, or edema  LYMPH: No lymphadenopathy noted  SKIN: No rashes or lesions    LABS:                        11.3   10.63 )-----------( 195      ( 11 Mar 2020 06:36 )             36.4     03-11    134<L>  |  93<L>  |  26<H>  ----------------------------<  185<H>  4.5   |  29  |  0.67    Ca    9.0      11 Mar 2020 06:33          CAPILLARY BLOOD GLUCOSE      POCT Blood Glucose.: 144 mg/dL (11 Mar 2020 17:16)  POCT Blood Glucose.: 153 mg/dL (11 Mar 2020 12:25)  POCT Blood Glucose.: 191 mg/dL (11 Mar 2020 08:19)            MEDICATIONS  (STANDING):  albuterol/ipratropium for Nebulization 3 milliLiter(s) Nebulizer every 6 hours  aspirin enteric coated 81 milliGRAM(s) Oral daily  atorvastatin 40 milliGRAM(s) Oral at bedtime  budesonide 160 MICROgram(s)/formoterol 4.5 MICROgram(s) Inhaler 2 Puff(s) Inhalation two times a day  dextrose 5%. 1000 milliLiter(s) (50 mL/Hr) IV Continuous <Continuous>  dextrose 50% Injectable 12.5 Gram(s) IV Push once  dextrose 50% Injectable 25 Gram(s) IV Push once  dextrose 50% Injectable 25 Gram(s) IV Push once  enoxaparin Injectable 40 milliGRAM(s) SubCutaneous daily  furosemide    Tablet 20 milliGRAM(s) Oral daily  insulin glargine Injectable (LANTUS) 75 Unit(s) SubCutaneous at bedtime  insulin lispro (HumaLOG) corrective regimen sliding scale   SubCutaneous three times a day before meals  insulin lispro (HumaLOG) corrective regimen sliding scale   SubCutaneous at bedtime  insulin lispro Injectable (HumaLOG) 30 Unit(s) SubCutaneous three times a day with meals  lisinopril 2.5 milliGRAM(s) Oral daily  melatonin 5 milliGRAM(s) Oral at bedtime  nicotine - Inhaler 1 Each Inhalation daily  nystatin Powder 1 Application(s) Topical two times a day  pantoprazole    Tablet 40 milliGRAM(s) Oral daily  polyethylene glycol 3350 17 Gram(s) Oral every 12 hours  risperiDONE   Tablet 0.5 milliGRAM(s) Oral three times a day  senna 2 Tablet(s) Oral at bedtime  sertraline 50 milliGRAM(s) Oral at bedtime    MEDICATIONS  (PRN):  acetaminophen   Tablet .. 650 milliGRAM(s) Oral every 6 hours PRN Mild Pain (1 - 3)  aluminum hydroxide/magnesium hydroxide/simethicone Suspension 30 milliLiter(s) Oral every 4 hours PRN Dyspepsia  bisacodyl 5 milliGRAM(s) Oral every 12 hours PRN Constipation  dextrose 40% Gel 15 Gram(s) Oral once PRN Blood Glucose LESS THAN 70 milliGRAM(s)/deciliter  glucagon  Injectable 1 milliGRAM(s) IntraMuscular once PRN Glucose LESS THAN 70 milligrams/deciliter  oxycodone    5 mG/acetaminophen 325 mG 2 Tablet(s) Oral every 6 hours PRN Moderate Pain (4 - 6)      Care Discussed with Consultants/Other Providers [ ] YES  [ ] NO

## 2020-03-12 LAB
GLUCOSE BLDC GLUCOMTR-MCNC: 146 MG/DL — HIGH (ref 70–99)
GLUCOSE BLDC GLUCOMTR-MCNC: 151 MG/DL — HIGH (ref 70–99)
GLUCOSE BLDC GLUCOMTR-MCNC: 154 MG/DL — HIGH (ref 70–99)
GLUCOSE BLDC GLUCOMTR-MCNC: 161 MG/DL — HIGH (ref 70–99)
GLUCOSE BLDC GLUCOMTR-MCNC: 167 MG/DL — HIGH (ref 70–99)
HBA1C BLD-MCNC: 8.6 % — HIGH (ref 4–5.6)

## 2020-03-12 RX ADMIN — NYSTATIN CREAM 1 APPLICATION(S): 100000 CREAM TOPICAL at 21:01

## 2020-03-12 RX ADMIN — Medication 30 UNIT(S): at 12:57

## 2020-03-12 RX ADMIN — BUDESONIDE AND FORMOTEROL FUMARATE DIHYDRATE 2 PUFF(S): 160; 4.5 AEROSOL RESPIRATORY (INHALATION) at 08:56

## 2020-03-12 RX ADMIN — Medication 650 MILLIGRAM(S): at 23:40

## 2020-03-12 RX ADMIN — Medication 650 MILLIGRAM(S): at 17:26

## 2020-03-12 RX ADMIN — Medication 650 MILLIGRAM(S): at 09:01

## 2020-03-12 RX ADMIN — INSULIN GLARGINE 75 UNIT(S): 100 INJECTION, SOLUTION SUBCUTANEOUS at 22:23

## 2020-03-12 RX ADMIN — Medication 650 MILLIGRAM(S): at 19:00

## 2020-03-12 RX ADMIN — Medication 0: at 22:23

## 2020-03-12 RX ADMIN — RISPERIDONE 0.5 MILLIGRAM(S): 4 TABLET ORAL at 13:00

## 2020-03-12 RX ADMIN — OXYCODONE AND ACETAMINOPHEN 2 TABLET(S): 5; 325 TABLET ORAL at 13:30

## 2020-03-12 RX ADMIN — Medication 3 MILLILITER(S): at 08:56

## 2020-03-12 RX ADMIN — Medication 3 MILLILITER(S): at 17:25

## 2020-03-12 RX ADMIN — ATORVASTATIN CALCIUM 40 MILLIGRAM(S): 80 TABLET, FILM COATED ORAL at 21:01

## 2020-03-12 RX ADMIN — PANTOPRAZOLE SODIUM 40 MILLIGRAM(S): 20 TABLET, DELAYED RELEASE ORAL at 08:56

## 2020-03-12 RX ADMIN — Medication 3 MILLILITER(S): at 12:33

## 2020-03-12 RX ADMIN — OXYCODONE AND ACETAMINOPHEN 2 TABLET(S): 5; 325 TABLET ORAL at 12:33

## 2020-03-12 RX ADMIN — Medication 1: at 12:57

## 2020-03-12 RX ADMIN — Medication 5 MILLIGRAM(S): at 21:00

## 2020-03-12 RX ADMIN — NYSTATIN CREAM 1 APPLICATION(S): 100000 CREAM TOPICAL at 08:57

## 2020-03-12 RX ADMIN — RISPERIDONE 0.5 MILLIGRAM(S): 4 TABLET ORAL at 08:56

## 2020-03-12 RX ADMIN — BUDESONIDE AND FORMOTEROL FUMARATE DIHYDRATE 2 PUFF(S): 160; 4.5 AEROSOL RESPIRATORY (INHALATION) at 17:25

## 2020-03-12 RX ADMIN — Medication 650 MILLIGRAM(S): at 09:40

## 2020-03-12 RX ADMIN — OXYCODONE AND ACETAMINOPHEN 2 TABLET(S): 5; 325 TABLET ORAL at 21:34

## 2020-03-12 RX ADMIN — LISINOPRIL 2.5 MILLIGRAM(S): 2.5 TABLET ORAL at 08:56

## 2020-03-12 RX ADMIN — Medication 30 UNIT(S): at 08:55

## 2020-03-12 RX ADMIN — SERTRALINE 50 MILLIGRAM(S): 25 TABLET, FILM COATED ORAL at 21:00

## 2020-03-12 RX ADMIN — ENOXAPARIN SODIUM 40 MILLIGRAM(S): 100 INJECTION SUBCUTANEOUS at 12:33

## 2020-03-12 RX ADMIN — Medication 81 MILLIGRAM(S): at 12:34

## 2020-03-12 RX ADMIN — OXYCODONE AND ACETAMINOPHEN 2 TABLET(S): 5; 325 TABLET ORAL at 21:04

## 2020-03-12 RX ADMIN — Medication 30 UNIT(S): at 17:25

## 2020-03-12 RX ADMIN — Medication 1: at 08:55

## 2020-03-12 RX ADMIN — RISPERIDONE 0.5 MILLIGRAM(S): 4 TABLET ORAL at 21:00

## 2020-03-12 RX ADMIN — Medication 1: at 17:24

## 2020-03-12 RX ADMIN — Medication 20 MILLIGRAM(S): at 08:56

## 2020-03-12 RX ADMIN — Medication 3 MILLILITER(S): at 23:41

## 2020-03-12 NOTE — PROGRESS NOTE ADULT - PROBLEM/PLAN-1
DISPLAY PLAN FREE TEXT

## 2020-03-12 NOTE — PROGRESS NOTE ADULT - ASSESSMENT
59M w/ COPD on 2L NC, DM2 on insulin, CAD s/p stent, HFpEF, pulm HTN, HTN, charted hx of schizophrenia/bipolar disorder, panic disorder, and recent admit for COPD exacerbation d/c 12/12/19 presents from [facility] (SNF) for n/v/d and hyperglycemia. Admitted with diabetic ketoacidosis, found to have hyponatremia    A/P:    Hyponatremia:  Likely sec to hyperglycemia  Also on SSRI  Optimize glucose control  Monitor Na level at present  IF sodium woresens - check Urine NA, urine Osmo  Continue lasix at present in view of h/o CHF-Diastolic  Promote oral solute intake, avoid excessive free water intake     HTN:  Controlled   Monitor at present  Continue current meds     Hyperkalemia  hemolyzed sample  Repeat serum K WNL

## 2020-03-12 NOTE — PROGRESS NOTE ADULT - SUBJECTIVE AND OBJECTIVE BOX
Chief complaint  Patient is a 59y old  Male who presents with a chief complaint of 59M w/ n/v/d and hyperglycemia sent in from [facility] (12 Mar 2020 10:39)   Review of systems  Patient in bed, looks comfortable, no hypoglycemia.    Labs and Fingersticks  CAPILLARY BLOOD GLUCOSE      POCT Blood Glucose.: 167 mg/dL (12 Mar 2020 12:46)  POCT Blood Glucose.: 154 mg/dL (12 Mar 2020 08:44)  POCT Blood Glucose.: 161 mg/dL (11 Mar 2020 22:18)  POCT Blood Glucose.: 144 mg/dL (11 Mar 2020 17:16)      Anion Gap, Serum: 12 (03-11 @ 06:33)    Hemoglobin A1C, Whole Blood: 8.6 <H> (03-12 @ 08:46)    Calcium, Total Serum: 9.0 (03-11 @ 06:33)          03-11    134<L>  |  93<L>  |  26<H>  ----------------------------<  185<H>  4.5   |  29  |  0.67    Ca    9.0      11 Mar 2020 06:33                          11.3   10.63 )-----------( 195      ( 11 Mar 2020 06:36 )             36.4     Medications  MEDICATIONS  (STANDING):  albuterol/ipratropium for Nebulization 3 milliLiter(s) Nebulizer every 6 hours  aspirin enteric coated 81 milliGRAM(s) Oral daily  atorvastatin 40 milliGRAM(s) Oral at bedtime  budesonide 160 MICROgram(s)/formoterol 4.5 MICROgram(s) Inhaler 2 Puff(s) Inhalation two times a day  dextrose 5%. 1000 milliLiter(s) (50 mL/Hr) IV Continuous <Continuous>  dextrose 50% Injectable 12.5 Gram(s) IV Push once  dextrose 50% Injectable 25 Gram(s) IV Push once  dextrose 50% Injectable 25 Gram(s) IV Push once  enoxaparin Injectable 40 milliGRAM(s) SubCutaneous daily  furosemide    Tablet 20 milliGRAM(s) Oral daily  insulin glargine Injectable (LANTUS) 75 Unit(s) SubCutaneous at bedtime  insulin lispro (HumaLOG) corrective regimen sliding scale   SubCutaneous three times a day before meals  insulin lispro (HumaLOG) corrective regimen sliding scale   SubCutaneous at bedtime  insulin lispro Injectable (HumaLOG) 30 Unit(s) SubCutaneous three times a day with meals  lisinopril 2.5 milliGRAM(s) Oral daily  melatonin 5 milliGRAM(s) Oral at bedtime  nicotine - Inhaler 1 Each Inhalation daily  nystatin Powder 1 Application(s) Topical two times a day  pantoprazole    Tablet 40 milliGRAM(s) Oral daily  polyethylene glycol 3350 17 Gram(s) Oral every 12 hours  risperiDONE   Tablet 0.5 milliGRAM(s) Oral three times a day  senna 2 Tablet(s) Oral at bedtime  sertraline 50 milliGRAM(s) Oral at bedtime      Physical Exam  General: Patient comfortable in bed  Vital Signs Last 12 Hrs  T(F): 98.1 (03-12-20 @ 05:29), Max: 98.1 (03-12-20 @ 05:29)  HR: 79 (03-12-20 @ 05:29) (79 - 79)  BP: 113/79 (03-12-20 @ 05:29) (113/79 - 113/79)  BP(mean): --  RR: 18 (03-12-20 @ 05:29) (18 - 18)  SpO2: 97% (03-12-20 @ 05:29) (97% - 97%)  Neck: No palpable thyroid nodules.  CVS: S1S2, No murmurs  Respiratory: No wheezing, no crepitations  GI: Abdomen soft, bowel sounds positive  Musculoskeletal:  edema lower extremities.   Skin: No skin rashes, no ecchymosis    Diagnostics    Hemoglobin A1C, Whole Blood: AM Sched. Collection: 12-Mar-2020 06:00 (03-11 @ 16:03) Chief complaint  Patient is a 59y old  Male who presents with a chief  complaint of 59M w/ n/v/d and hyperglycemia sent in from [facility] (12 Mar 2020 10:39)   Review of systems  Patient in bed, looks comfortable, no hypoglycemia.    Labs and Fingersticks  CAPILLARY BLOOD GLUCOSE      POCT Blood Glucose.: 167 mg/dL (12 Mar 2020 12:46)  POCT Blood Glucose.: 154 mg/dL (12 Mar 2020 08:44)  POCT Blood Glucose.: 161 mg/dL (11 Mar 2020 22:18)  POCT Blood Glucose.: 144 mg/dL (11 Mar 2020 17:16)      Anion Gap, Serum: 12 (03-11 @ 06:33)    Hemoglobin A1C, Whole Blood: 8.6 <H> (03-12 @ 08:46)    Calcium, Total Serum: 9.0 (03-11 @ 06:33)          03-11    134<L>  |  93<L>  |  26<H>  ----------------------------<  185<H>  4.5   |  29  |  0.67    Ca    9.0      11 Mar 2020 06:33                          11.3   10.63 )-----------( 195      ( 11 Mar 2020 06:36 )             36.4     Medications  MEDICATIONS  (STANDING):  albuterol/ipratropium for Nebulization 3 milliLiter(s) Nebulizer every 6 hours  aspirin enteric coated 81 milliGRAM(s) Oral daily  atorvastatin 40 milliGRAM(s) Oral at bedtime  budesonide 160 MICROgram(s)/formoterol 4.5 MICROgram(s) Inhaler 2 Puff(s) Inhalation two times a day  dextrose 5%. 1000 milliLiter(s) (50 mL/Hr) IV Continuous <Continuous>  dextrose 50% Injectable 12.5 Gram(s) IV Push once  dextrose 50% Injectable 25 Gram(s) IV Push once  dextrose 50% Injectable 25 Gram(s) IV Push once  enoxaparin Injectable 40 milliGRAM(s) SubCutaneous daily  furosemide    Tablet 20 milliGRAM(s) Oral daily  insulin glargine Injectable (LANTUS) 75 Unit(s) SubCutaneous at bedtime  insulin lispro (HumaLOG) corrective regimen sliding scale   SubCutaneous three times a day before meals  insulin lispro (HumaLOG) corrective regimen sliding scale   SubCutaneous at bedtime  insulin lispro Injectable (HumaLOG) 30 Unit(s) SubCutaneous three times a day with meals  lisinopril 2.5 milliGRAM(s) Oral daily  melatonin 5 milliGRAM(s) Oral at bedtime  nicotine - Inhaler 1 Each Inhalation daily  nystatin Powder 1 Application(s) Topical two times a day  pantoprazole    Tablet 40 milliGRAM(s) Oral daily  polyethylene glycol 3350 17 Gram(s) Oral every 12 hours  risperiDONE   Tablet 0.5 milliGRAM(s) Oral three times a day  senna 2 Tablet(s) Oral at bedtime  sertraline 50 milliGRAM(s) Oral at bedtime      Physical Exam  General: Patient comfortable in bed  Vital Signs Last 12 Hrs  T(F): 98.1 (03-12-20 @ 05:29), Max: 98.1 (03-12-20 @ 05:29)  HR: 79 (03-12-20 @ 05:29) (79 - 79)  BP: 113/79 (03-12-20 @ 05:29) (113/79 - 113/79)  BP(mean): --  RR: 18 (03-12-20 @ 05:29) (18 - 18)  SpO2: 97% (03-12-20 @ 05:29) (97% - 97%)  Neck: No palpable thyroid nodules.  CVS: S1S2, No murmurs  Respiratory: No wheezing, no crepitations  GI: Abdomen soft, bowel sounds positive  Musculoskeletal:  edema lower extremities.   Skin: No skin rashes, no ecchymosis    Diagnostics    Hemoglobin A1C, Whole Blood: AM Sched. Collection: 12-Mar-2020 06:00 (03-11 @ 16:03)

## 2020-03-12 NOTE — PROGRESS NOTE ADULT - PROBLEM SELECTOR PLAN 3
seen by psych   now stable   -c/w zoloft
ABG today noted: seems OK: oxygenation pretty good: towards mild alkalotic side
ABG today noted: seems OK: oxygenation pretty good: towards mild alkalotic side  1/6: no sob: cont oxygen
ABG today noted: seems OK: oxygenation pretty good: towards mild alkalotic side  1/6: no sob: cont oxygen  1/7: cont oxygen: breathing wise he is doing better
ABG today noted: seems OK: oxygenation pretty good: towards mild alkalotic side  1/6: no sob: cont oxygen  1/8: compensated resp failure: no wheezing
ABG today noted: seems OK: oxygenation pretty good: towards mild alkalotic side  1/6: no sob: cont oxygen  1/8: compensated resp failure: no wheezing  1/10: stabl;e: cont oxygeb
ABG today noted: seems OK: oxygenation pretty good: towards mild alkalotic side  1/6: no sob: cont oxygen  1/8: compensated resp failure: no wheezing  1/10: stabl;e: cont oxygeb  1/11 stable. wean off oxygen
ABG today noted: seems OK: oxygenation pretty good: towards mild alkalotic side  1/6: no sob: cont oxygen  1/8: compensated resp failure: no wheezing  1/10: stabl;e: cont oxygeb  1/11 stable. wean off oxygen  1/12 stable. wean off oxygen, goal of O2 sat greater than 88%
ABG today noted: seems OK: oxygenation pretty good: towards mild alkalotic side  1/6: no sob: cont oxygen  1/8: compensated resp failure: no wheezing  1/10: stabl;e: cont oxygeb  1/11 stable. wean off oxygen  1/12 stable. wean off oxygen, goal of O2 sat greater than 88%  1/13: stable on oxygen
Will continue statin, primary team FU.
improved
improving accordingly  pulm rx, oxygen supplementation prn to maintain spo2 > 90%, outpt pft's, smoking cessation education provided
improving accordingly  pulm rx, oxygen supplementation prn to maintain spo2 > 90%, outpt pft's, smoking cessation education provided
seen by psych   now stable   -c/w zoloft
seen by psych   now stable   -c/w zoloft
Will continue statin, primary team FU.
improved

## 2020-03-12 NOTE — PROGRESS NOTE ADULT - PROVIDER SPECIALTY LIST ADULT
Endocrinology
Infectious Disease
Internal Medicine
Nephrology
Pulmonology
Internal Medicine

## 2020-03-12 NOTE — PROGRESS NOTE ADULT - SUBJECTIVE AND OBJECTIVE BOX
Patient is a 59y old  Male who presents with a chief complaint of 59M w/ n/v/d and hyperglycemia sent in from [facility] (12 Mar 2020 14:22)    pt. seen and examined , no c/o     INTERVAL HPI/OVERNIGHT EVENTS:  T(C): 36.7 (03-12-20 @ 23:54), Max: 36.7 (03-12-20 @ 05:29)  HR: 87 (03-12-20 @ 23:54) (79 - 112)  BP: 115/66 (03-12-20 @ 23:54) (98/66 - 115/66)  RR: 18 (03-12-20 @ 23:54) (18 - 18)  SpO2: 95% (03-12-20 @ 23:54) (93% - 97%)  Wt(kg): --  I&O's Summary    11 Mar 2020 07:01  -  12 Mar 2020 07:00  --------------------------------------------------------  IN: 480 mL / OUT: 2400 mL / NET: -1920 mL    12 Mar 2020 07:01  -  13 Mar 2020 01:31  --------------------------------------------------------  IN: 720 mL / OUT: 0 mL / NET: 720 mL        PAST MEDICAL & SURGICAL HISTORY:  Oxygen dependent  Gastroesophageal reflux disease, esophagitis presence not specified  Smoker  Bipolar 1 disorder  Coronary artery disease involving native coronary artery of native heart without angina pectoris  Type 2 diabetes mellitus without complication, with long-term current use of insulin  Pulmonary HTN  HLD (hyperlipidemia)  Stented coronary artery  COPD (chronic obstructive pulmonary disease)  No significant past surgical history      SOCIAL HISTORY  Alcohol:  Tobacco:  Illicit substance use:    FAMILY HISTORY:    REVIEW OF SYSTEMS:  CONSTITUTIONAL: No fever, weight loss, or fatigue  EYES: No eye pain, visual disturbances, or discharge  ENMT:  No difficulty hearing, tinnitus, vertigo; No sinus or throat pain  NECK: No pain or stiffness  RESPIRATORY: No cough, wheezing, chills or hemoptysis; No shortness of breath  CARDIOVASCULAR: No chest pain, palpitations, dizziness, or leg swelling  GASTROINTESTINAL: No abdominal or epigastric pain. No nausea, vomiting, or hematemesis; No diarrhea or constipation. No melena or hematochezia.  GENITOURINARY: No dysuria, frequency, hematuria, or incontinence  NEUROLOGICAL: No headaches, memory loss, loss of strength, numbness, or tremors  SKIN: No itching, burning, rashes, or lesions   LYMPH NODES: No enlarged glands  ENDOCRINE: No heat or cold intolerance; No hair loss  MUSCULOSKELETAL: No joint pain or swelling; No muscle, back, or extremity pain  PSYCHIATRIC: No depression, anxiety, mood swings, or difficulty sleeping  HEME/LYMPH: No easy bruising, or bleeding gums  ALLERY AND IMMUNOLOGIC: No hives or eczema    RADIOLOGY & ADDITIONAL TESTS:    Imaging Personally Reviewed:  [ ] YES  [ ] NO    Consultant(s) Notes Reviewed:  [ ] YES  [ ] NO    PHYSICAL EXAM:  GENERAL: NAD, well-groomed, well-developed  HEAD:  Atraumatic, Normocephalic  EYES: EOMI, PERRLA, conjunctiva and sclera clear  ENMT: No tonsillar erythema, exudates, or enlargement; Moist mucous membranes, Good dentition, No lesions  NECK: Supple, No JVD, Normal thyroid  NERVOUS SYSTEM:  Alert & Oriented X3, Good concentration; Motor Strength 5/5 B/L upper and lower extremities; DTRs 2+ intact and symmetric  CHEST/LUNG: Clear to percussion bilaterally; No rales, rhonchi, wheezing, or rubs  HEART: Regular rate and rhythm; No murmurs, rubs, or gallops  ABDOMEN: Soft, Nontender, Nondistended; Bowel sounds present  EXTREMITIES:  2+ Peripheral Pulses, No clubbing, cyanosis, or edema  LYMPH: No lymphadenopathy noted  SKIN: No rashes or lesions    LABS:                        11.3   10.63 )-----------( 195      ( 11 Mar 2020 06:36 )             36.4     03-11    134<L>  |  93<L>  |  26<H>  ----------------------------<  185<H>  4.5   |  29  |  0.67    Ca    9.0      11 Mar 2020 06:33          CAPILLARY BLOOD GLUCOSE      POCT Blood Glucose.: 146 mg/dL (12 Mar 2020 22:09)  POCT Blood Glucose.: 151 mg/dL (12 Mar 2020 17:19)  POCT Blood Glucose.: 167 mg/dL (12 Mar 2020 12:46)  POCT Blood Glucose.: 154 mg/dL (12 Mar 2020 08:44)            MEDICATIONS  (STANDING):  albuterol/ipratropium for Nebulization 3 milliLiter(s) Nebulizer every 6 hours  aspirin enteric coated 81 milliGRAM(s) Oral daily  atorvastatin 40 milliGRAM(s) Oral at bedtime  budesonide 160 MICROgram(s)/formoterol 4.5 MICROgram(s) Inhaler 2 Puff(s) Inhalation two times a day  dextrose 5%. 1000 milliLiter(s) (50 mL/Hr) IV Continuous <Continuous>  dextrose 50% Injectable 12.5 Gram(s) IV Push once  dextrose 50% Injectable 25 Gram(s) IV Push once  dextrose 50% Injectable 25 Gram(s) IV Push once  enoxaparin Injectable 40 milliGRAM(s) SubCutaneous daily  furosemide    Tablet 20 milliGRAM(s) Oral daily  insulin glargine Injectable (LANTUS) 75 Unit(s) SubCutaneous at bedtime  insulin lispro (HumaLOG) corrective regimen sliding scale   SubCutaneous three times a day before meals  insulin lispro (HumaLOG) corrective regimen sliding scale   SubCutaneous at bedtime  insulin lispro Injectable (HumaLOG) 30 Unit(s) SubCutaneous three times a day with meals  lisinopril 2.5 milliGRAM(s) Oral daily  melatonin 5 milliGRAM(s) Oral at bedtime  nicotine - Inhaler 1 Each Inhalation daily  nystatin Powder 1 Application(s) Topical two times a day  pantoprazole    Tablet 40 milliGRAM(s) Oral daily  polyethylene glycol 3350 17 Gram(s) Oral every 12 hours  risperiDONE   Tablet 0.5 milliGRAM(s) Oral three times a day  senna 2 Tablet(s) Oral at bedtime  sertraline 50 milliGRAM(s) Oral at bedtime    MEDICATIONS  (PRN):  acetaminophen   Tablet .. 650 milliGRAM(s) Oral every 6 hours PRN Mild Pain (1 - 3)  aluminum hydroxide/magnesium hydroxide/simethicone Suspension 30 milliLiter(s) Oral every 4 hours PRN Dyspepsia  bisacodyl 5 milliGRAM(s) Oral every 12 hours PRN Constipation  dextrose 40% Gel 15 Gram(s) Oral once PRN Blood Glucose LESS THAN 70 milliGRAM(s)/deciliter  glucagon  Injectable 1 milliGRAM(s) IntraMuscular once PRN Glucose LESS THAN 70 milligrams/deciliter      Care Discussed with Consultants/Other Providers [ ] YES  [ ] NO

## 2020-03-12 NOTE — PROGRESS NOTE ADULT - ASSESSMENT
Assessment  DMT2: 59y Male with DM T2 with hyperglycemia, A1C improved to 8.6% while inpatient (was last 10.4%), on basal bolus insulin, blood sugars trending within overall acceptable  range, no hypoglycemic episodes. Patient is eating full meals with good appetite, alert and comfortable, pending DC to Assisted Living.  CAD: on medications, no chest pain, stable, monitored.  HTN: Controlled,  on antihypertensive medications.  Overweight/Obesity: No strict exercise routines, not on any weight loss program,  neither on low  calorie diet.          Laury Romero MD  Cell: [phone]  Office: [phone] Assessment  DMT2: 59y Male with DM T2 with hyperglycemia, A1C improved to 8.6% while inpatient (was last 10.4%), on basal bolus insulin,  blood sugars trending within overall acceptable  range, no hypoglycemic episodes. Patient is eating full meals with good appetite, alert and comfortable, pending DC to Assisted Living.  CAD: on medications, no chest pain, stable, monitored.  HTN: Controlled,  on antihypertensive medications.  Overweight/Obesity: No strict exercise routines, not on any weight loss program,  neither on low  calorie diet.          Laury Romero MD  Cell: [phone]  Office: [phone]

## 2020-03-12 NOTE — PROGRESS NOTE ADULT - PROBLEM SELECTOR PROBLEM 1
Diabetes
Diabetic ketoacidosis without coma associated with type 2 diabetes mellitus
Hyperglycemia
Type 2 diabetes mellitus without complication, with long-term current use of insulin
Diabetes
Diabetes
Type 2 diabetes mellitus without complication, with long-term current use of insulin
Diabetic ketoacidosis without coma associated with type 2 diabetes mellitus

## 2020-03-12 NOTE — PROGRESS NOTE ADULT - PROBLEM SELECTOR PLAN 4
c/w asa  -d/c plavix   - lisinopril 2.5 mg daily
Overweight/Obesity: Patient counseled for weight loss, exercise, low calorie diet.
Cont home meds
Cont home meds  1/11 defer to primary
Cont home meds  1/11 defer to primary  1/12 defer to primary
Cont home meds  1/11 defer to primary  1/12 defer to primary
Overweight/Obesity: Patient counseled for weight loss, exercise, low calorie diet.
c/w asa  -d/c plavix   - lisinopril 2.5 mg daily
c/w asa  -d/c plavix   -add lisinopril 2.5 mg daily
improved
resolved: he has no nausea and vomiting now:
resolved: he has no nausea and vomiting now:  1/8: resolved

## 2020-03-12 NOTE — PROGRESS NOTE ADULT - ATTENDING COMMENTS
awaiting placement
pt. is being waiting for placement in shelter, difficult placement ,  working on case for weeks
tentative d/c date 3/10 to [facility] living [facility]
tentative d/c date 3/10 to [facility] living [facility]
tentative d/c date 3/10 to [facility] living [facility]
tentative d/c date 3/11 to [facility] living [facility]
tentative d/c date 3/10 to [facility] living [facility]
tentative d/c date 3/10 to [facility] living [facility]
Patient seen and exam today at bedside. Chart, labs, vitals, radiology reviewed. Above H&P reviewed and edited where appropriate.  Agree with history and physical exam. Agree with assessment and plan.
Suggest to DC on current insulin regimen and FU endo 4 weeks.  Patient counseled for compliance with consistent low carb diet and exercise as tolerated outpatient.
awaiting placement
awaiting placement to shelter from over 2 weeks
awaiting placement to shelter from over 2 weeks
today d/w him and now he is agreeing to return to [facility] [place] , will d/w 
today d/w him and now he is agreeing to return to [facility] [place] , will d/w 
today d/w him and now he is agreeing to return to [facility] [place] , will d/w 
DC to rehab on current insulin regimen and FU endo  4 weeks.   Patient counseled for compliance with consistent low carb diet and exercise as tolerated outpatient.
Patient counseled for compliance with consistent low carb diet and exercise as tolerated outpatient.
Patient counseled for compliance with consistent low carb diet, exercise as tolerated as out patient.
Patient counseled for compliance with consistent low carb diet, exercise as tolerated as out patient.
Patient seen and exam today at bedside. Chart, labs, vitals, radiology reviewed. Above H&P reviewed and edited where appropriate.  Agree with history and physical exam. Agree with assessment and plan.
Patient seen and exam today at bedside. Chart, labs, vitals, radiology reviewed. Above H&P reviewed and edited where appropriate.  Agree with history and physical exam. Agree with assessment and plan.
Suggest to DC on current insulin regimen and FU  endo 4 weeks. Discussed with patient.  Patient counseled for compliance with consistent low carb diet and exercise as tolerated outpatient.
Suggest to DC on current insulin regimen and FU endo 4 weeks.  Discussed plan with patient. Counseled for compliance with consistent low carb diet, exercise.  Patient seen and exam today at bedside. Chart, labs, vitals, radiology reviewed. Above H&P reviewed and edited where appropriate.  Agree with history and physical exam. Agree with assessment and plan.
Suggest to DC on current insulin regimen and FU endo 4 weeks.  Patient counseled for compliance with consistent low carb diet and exercise as tolerated outpatient.
Suggest to DC on current insulin regimen and FU endo 4 weeks.  Patient counseled for compliance with consistent low carb diet and exercise as tolerated outpatient.
Suggest to DC to rehab on basal bolus insulin; REBECCA correa.
Suggest to DC to rehab on current basal bolus insulin regimen and FU endo 2 weeks.  Patient counseled for compliance with consistent low carb diet and exercise as tolerated outpatient.
Suggest to DC to rehab on current basal bolus insulin regimen and FU endo 2 weeks.  Patient counseled for compliance with consistent low carb diet and exercise as tolerated outpatient.
Suggest to DC to rehab on current basal bolus insulin regimen and FU endo 4 weeks.  Patient counseled for compliance with consistent low carb diet and exercise as tolerated outpatient.
Suggest to DC to rehab on current insulin regimen and FU endo 4 weeks.  Patient counseled for compliance with consistent low carb diet and exercise as tolerated outpatient.
Suggest to DC to rehab on current insulin regimen and FU endo 4 weeks.  Patient counseled for compliance with consistent low carb diet, exercise as tolerated as out patient.
Suggest to DC to rehab on current insulin regimen and FU endo 4 weeks. Discussed plan with patient. Counseled for compliance with consistent low carb diet and exercise as tolerated outpatient.
Suggest to DC to rehab on current insulin regimen and FU endo 4 weeks. Discussed plan with patient. Counseled for compliance with consistent low carb diet and exercise as tolerated outpatient.
Suggest to DC to rehab on current insulin regimen and FU endo.  Patient counseled for compliance with consistent low carb diet and exercise as tolerated outpatient.
Suggest to DC to rehab on current insulin regimen.  Patient counseled for compliance with consistent low carb diet and exercise as tolerated outpatient.
Suggest to DC to rehab on current insulin regimen. FU endo 4 weeks.  Patient counseled for compliance with consistent low carb diet and exercise as tolerated outpatient.
Will continue Lantus 60u at bedtime.  Will increase Humalog to 22u before each meal and continue coverage scale. Will continue monitoring FS and FU.
Will continue current insulin regimen for now. Will continue monitoring FS and FU.
Will continue current insulin regimen for now. Will continue monitoring FS, log, and FU.
Will continue current insulin regimen for now; Will continue monitoring FS and FU.  Suggest to DC to rehab on current insulin regimen and FU endo 2 weeks.
Will increase Lantus to 50u at bedtime, increase Humalog to 15u before each meal, and continue Humalog correction scale coverage.
Will increase Lantus to 66u at bedtime.  Will increase Humalog to 25u before each meal and continue Humalog correction scale coverage. Will continue monitoring FS and FU.
Will increase Lantus to 70u at bedtime.  Will increase Humalog to 27u before each meal and continue Humalog correction scale coverage. Will continue monitoring FS, log, will Follow up.
Will increase Lantus to 60u at bedtime.  Will increase Humalog to 20u before each meal and continue Humalog correction scale coverage. Will continue monitoring FS, log, will Follow up.
awaiting placement
awaiting placement to shelter from last 2 weeks
awaiting placement to shelter from over 2 weeks
doing OK : stable: no wheezing  1/13: willsign off: if needed please callme at 
pt is stble from pulmonary point of vierw: no need for steroids  1/8: seems stable: dc planning  1/10: AWAITING DC PLANNING: PULM WISE STABLE:
doing OK : stable: no wheezing
seems to be stable: no SOB : no cough
pt is stble from pulmonary point of vierw: no need for steroids  1/7: seems to be stable: cont oxygen : no ton steorids or antibtiocs
pt is stble from pulmonary point of vierw: no need for steroids
pt is stble from pulmonary point of vierw: no need for steroids  1/8: seems stable: dc planning
awaiting placement
d/c planning / placement

## 2020-03-12 NOTE — PROGRESS NOTE ADULT - SUBJECTIVE AND OBJECTIVE BOX
[facility] [place] [facility]   MD JONAH SNOW MD RUORU WONG, PA    TEL:  OFFICE: [phone]  DR LUGO CELL: [phone]  PATRIZIA BRISCOE CELL: [phone]  DR. FINCH CELL: [phone]  DR. BARRIOS CELL: [phone]    FROM 5 PM - 7 AM PLEASE CALL ANSWERING SERVICE: [phone]    RENAL FOLLOW UP NOTE  --------------------------------------------------------------------------------  HPI:      Pt seen and examined at bedside.   Denies SOB, chest pain     PAST HISTORY  --------------------------------------------------------------------------------  No significant changes to PMH, PSH, FHx, SHx, unless otherwise noted    ALLERGIES & MEDICATIONS  --------------------------------------------------------------------------------  Allergies    No Known Allergies    Intolerances      Standing Inpatient Medications  albuterol/ipratropium for Nebulization 3 milliLiter(s) Nebulizer every 6 hours  aspirin enteric coated 81 milliGRAM(s) Oral daily  atorvastatin 40 milliGRAM(s) Oral at bedtime  budesonide 160 MICROgram(s)/formoterol 4.5 MICROgram(s) Inhaler 2 Puff(s) Inhalation two times a day  dextrose 5%. 1000 milliLiter(s) IV Continuous <Continuous>  dextrose 50% Injectable 12.5 Gram(s) IV Push once  dextrose 50% Injectable 25 Gram(s) IV Push once  dextrose 50% Injectable 25 Gram(s) IV Push once  enoxaparin Injectable 40 milliGRAM(s) SubCutaneous daily  furosemide    Tablet 20 milliGRAM(s) Oral daily  insulin glargine Injectable (LANTUS) 75 Unit(s) SubCutaneous at bedtime  insulin lispro (HumaLOG) corrective regimen sliding scale   SubCutaneous three times a day before meals  insulin lispro (HumaLOG) corrective regimen sliding scale   SubCutaneous at bedtime  insulin lispro Injectable (HumaLOG) 30 Unit(s) SubCutaneous three times a day with meals  lisinopril 2.5 milliGRAM(s) Oral daily  melatonin 5 milliGRAM(s) Oral at bedtime  nicotine - Inhaler 1 Each Inhalation daily  nystatin Powder 1 Application(s) Topical two times a day  pantoprazole    Tablet 40 milliGRAM(s) Oral daily  polyethylene glycol 3350 17 Gram(s) Oral every 12 hours  risperiDONE   Tablet 0.5 milliGRAM(s) Oral three times a day  senna 2 Tablet(s) Oral at bedtime  sertraline 50 milliGRAM(s) Oral at bedtime    PRN Inpatient Medications  acetaminophen   Tablet .. 650 milliGRAM(s) Oral every 6 hours PRN  aluminum hydroxide/magnesium hydroxide/simethicone Suspension 30 milliLiter(s) Oral every 4 hours PRN  bisacodyl 5 milliGRAM(s) Oral every 12 hours PRN  dextrose 40% Gel 15 Gram(s) Oral once PRN  glucagon  Injectable 1 milliGRAM(s) IntraMuscular once PRN  oxycodone    5 mG/acetaminophen 325 mG 2 Tablet(s) Oral every 6 hours PRN      REVIEW OF SYSTEMS  --------------------------------------------------------------------------------  General: no fever  CVS: no chest pain  RESP: no sob, no cough  ABD: no abdominal pain  : no dysuria,  MSK: no edema     VITALS/PHYSICAL EXAM  --------------------------------------------------------------------------------  T(C): 36.7 (03-12-20 @ 05:29), Max: 36.7 (03-11-20 @ 19:40)  HR: 79 (03-12-20 @ 05:29) (79 - 94)  BP: 113/79 (03-12-20 @ 05:29) (113/79 - 132/84)  RR: 18 (03-12-20 @ 05:29) (18 - 18)  SpO2: 97% (03-12-20 @ 05:29) (89% - 97%)  Wt(kg): --        03-11-20 @ 07:01  -  03-12-20 @ 07:00  --------------------------------------------------------  IN: 480 mL / OUT: 2400 mL / NET: -1920 mL      Physical Exam:  	Gen: NAD  	HEENT: MMM  	Pulm: CTA B/L  	CV: S1S2  	Abd: Soft, +BS  	Ext: No LE edema B/L                      Neuro: Awake alert  	Skin: Warm and Dry   	 no deedee    LABS/STUDIES  --------------------------------------------------------------------------------              11.3   10.63 >-----------<  195      [03-11-20 @ 06:36]              36.4     134  |  93  |  26  ----------------------------<  185      [03-11-20 @ 06:33]  4.5   |  29  |  0.67        Ca     9.0     [03-11-20 @ 06:33]            Creatinine Trend:  SCr 0.67 [03-11 @ 06:33]  SCr 0.73 [03-05 @ 15:06]  SCr 0.59 [02-29 @ 07:21]        HbA1c 8.6      [03-12-20 @ 08:46]

## 2020-03-12 NOTE — PROGRESS NOTE ADULT - PROBLEM SELECTOR PROBLEM 3
Bipolar affect, depressed
Acute on chronic respiratory failure with hypoxia
Gastroenteritis
HLD (hyperlipidemia)
Bipolar affect, depressed
Bipolar affect, depressed
HLD (hyperlipidemia)
Gastroenteritis

## 2020-03-12 NOTE — PROGRESS NOTE ADULT - PROBLEM SELECTOR PROBLEM 4
Obesity
Coronary artery disease involving native coronary artery of native heart without angina pectoris
Gastroenteritis
Obesity
Psychiatric diagnosis
Coronary artery disease involving native coronary artery of native heart without angina pectoris

## 2020-03-12 NOTE — PROGRESS NOTE ADULT - PROBLEM SELECTOR PROBLEM 2
DKA (diabetic ketoacidoses)
Acute on chronic respiratory failure with hypoxia
Coronary artery disease involving native coronary artery of native heart without angina pectoris
Severe sepsis
DKA (diabetic ketoacidoses)
DKA (diabetic ketoacidoses)
Coronary artery disease involving native coronary artery of native heart without angina pectoris
Acute on chronic respiratory failure with hypoxia

## 2020-03-12 NOTE — PROGRESS NOTE ADULT - REASON FOR ADMISSION
59M w/ n/v/d and hyperglycemia sent in from SNF

## 2020-03-13 VITALS
TEMPERATURE: 98 F | HEART RATE: 96 BPM | RESPIRATION RATE: 18 BRPM | OXYGEN SATURATION: 95 % | DIASTOLIC BLOOD PRESSURE: 75 MMHG | SYSTOLIC BLOOD PRESSURE: 108 MMHG

## 2020-03-13 LAB — GLUCOSE BLDC GLUCOMTR-MCNC: 286 MG/DL — HIGH (ref 70–99)

## 2020-03-13 PROCEDURE — 82435 ASSAY OF BLOOD CHLORIDE: CPT

## 2020-03-13 PROCEDURE — 51702 INSERT TEMP BLADDER CATH: CPT

## 2020-03-13 PROCEDURE — 80048 BASIC METABOLIC PNL TOTAL CA: CPT

## 2020-03-13 PROCEDURE — 83930 ASSAY OF BLOOD OSMOLALITY: CPT

## 2020-03-13 PROCEDURE — 86901 BLOOD TYPING SEROLOGIC RH(D): CPT

## 2020-03-13 PROCEDURE — 87631 RESP VIRUS 3-5 TARGETS: CPT

## 2020-03-13 PROCEDURE — 86900 BLOOD TYPING SEROLOGIC ABO: CPT

## 2020-03-13 PROCEDURE — 83605 ASSAY OF LACTIC ACID: CPT

## 2020-03-13 PROCEDURE — 82565 ASSAY OF CREATININE: CPT

## 2020-03-13 PROCEDURE — 94660 CPAP INITIATION&MGMT: CPT

## 2020-03-13 PROCEDURE — 82010 KETONE BODYS QUAN: CPT

## 2020-03-13 PROCEDURE — 87086 URINE CULTURE/COLONY COUNT: CPT

## 2020-03-13 PROCEDURE — 84145 PROCALCITONIN (PCT): CPT

## 2020-03-13 PROCEDURE — 83935 ASSAY OF URINE OSMOLALITY: CPT

## 2020-03-13 PROCEDURE — 85610 PROTHROMBIN TIME: CPT

## 2020-03-13 PROCEDURE — 83735 ASSAY OF MAGNESIUM: CPT

## 2020-03-13 PROCEDURE — 71045 X-RAY EXAM CHEST 1 VIEW: CPT

## 2020-03-13 PROCEDURE — 99291 CRITICAL CARE FIRST HOUR: CPT | Mod: 25

## 2020-03-13 PROCEDURE — 84484 ASSAY OF TROPONIN QUANT: CPT

## 2020-03-13 PROCEDURE — 86850 RBC ANTIBODY SCREEN: CPT

## 2020-03-13 PROCEDURE — 83036 HEMOGLOBIN GLYCOSYLATED A1C: CPT

## 2020-03-13 PROCEDURE — 82803 BLOOD GASES ANY COMBINATION: CPT

## 2020-03-13 PROCEDURE — 84300 ASSAY OF URINE SODIUM: CPT

## 2020-03-13 PROCEDURE — 74177 CT ABD & PELVIS W/CONTRAST: CPT

## 2020-03-13 PROCEDURE — 84100 ASSAY OF PHOSPHORUS: CPT

## 2020-03-13 PROCEDURE — 80053 COMPREHEN METABOLIC PANEL: CPT

## 2020-03-13 PROCEDURE — 82947 ASSAY GLUCOSE BLOOD QUANT: CPT

## 2020-03-13 PROCEDURE — 82330 ASSAY OF CALCIUM: CPT

## 2020-03-13 PROCEDURE — 94640 AIRWAY INHALATION TREATMENT: CPT

## 2020-03-13 PROCEDURE — 99238 HOSP IP/OBS DSCHRG MGMT 30/<: CPT

## 2020-03-13 PROCEDURE — 85027 COMPLETE CBC AUTOMATED: CPT

## 2020-03-13 PROCEDURE — 97161 PT EVAL LOW COMPLEX 20 MIN: CPT

## 2020-03-13 PROCEDURE — 36600 WITHDRAWAL OF ARTERIAL BLOOD: CPT

## 2020-03-13 PROCEDURE — 93005 ELECTROCARDIOGRAM TRACING: CPT | Mod: XU

## 2020-03-13 PROCEDURE — 82962 GLUCOSE BLOOD TEST: CPT

## 2020-03-13 PROCEDURE — 87040 BLOOD CULTURE FOR BACTERIA: CPT

## 2020-03-13 PROCEDURE — 71250 CT THORAX DX C-: CPT

## 2020-03-13 PROCEDURE — 85730 THROMBOPLASTIN TIME PARTIAL: CPT

## 2020-03-13 PROCEDURE — 81001 URINALYSIS AUTO W/SCOPE: CPT

## 2020-03-13 PROCEDURE — 85014 HEMATOCRIT: CPT

## 2020-03-13 PROCEDURE — 96375 TX/PRO/DX INJ NEW DRUG ADDON: CPT | Mod: XU

## 2020-03-13 PROCEDURE — 84295 ASSAY OF SERUM SODIUM: CPT

## 2020-03-13 PROCEDURE — 96374 THER/PROPH/DIAG INJ IV PUSH: CPT | Mod: XU

## 2020-03-13 PROCEDURE — 84132 ASSAY OF SERUM POTASSIUM: CPT

## 2020-03-13 RX ORDER — PANTOPRAZOLE SODIUM 20 MG/1
1 TABLET, DELAYED RELEASE ORAL
Qty: 0 | Refills: 0 | DISCHARGE
Start: 2020-03-13

## 2020-03-13 RX ORDER — INSULIN LISPRO 100/ML
10 VIAL (ML) SUBCUTANEOUS
Qty: 0 | Refills: 0 | DISCHARGE

## 2020-03-13 RX ORDER — LISINOPRIL 2.5 MG/1
1 TABLET ORAL
Qty: 0 | Refills: 0 | DISCHARGE
Start: 2020-03-13

## 2020-03-13 RX ORDER — OXYCODONE AND ACETAMINOPHEN 5; 325 MG/1; MG/1
2 TABLET ORAL EVERY 6 HOURS
Refills: 0 | Status: DISCONTINUED | OUTPATIENT
Start: 2020-03-13 | End: 2020-03-13

## 2020-03-13 RX ORDER — SERTRALINE 25 MG/1
1 TABLET, FILM COATED ORAL
Qty: 0 | Refills: 0 | DISCHARGE
Start: 2020-03-13

## 2020-03-13 RX ADMIN — LISINOPRIL 2.5 MILLIGRAM(S): 2.5 TABLET ORAL at 05:18

## 2020-03-13 RX ADMIN — Medication 3 MILLILITER(S): at 05:18

## 2020-03-13 RX ADMIN — OXYCODONE AND ACETAMINOPHEN 2 TABLET(S): 5; 325 TABLET ORAL at 10:12

## 2020-03-13 RX ADMIN — RISPERIDONE 0.5 MILLIGRAM(S): 4 TABLET ORAL at 05:18

## 2020-03-13 RX ADMIN — NYSTATIN CREAM 1 APPLICATION(S): 100000 CREAM TOPICAL at 05:18

## 2020-03-13 RX ADMIN — OXYCODONE AND ACETAMINOPHEN 2 TABLET(S): 5; 325 TABLET ORAL at 06:00

## 2020-03-13 RX ADMIN — Medication 20 MILLIGRAM(S): at 05:18

## 2020-03-13 RX ADMIN — TUBERCULIN PURIFIED PROTEIN DERIVATIVE 5 UNIT(S): 5 INJECTION, SOLUTION INTRADERMAL at 10:05

## 2020-03-13 RX ADMIN — OXYCODONE AND ACETAMINOPHEN 2 TABLET(S): 5; 325 TABLET ORAL at 05:30

## 2020-03-13 RX ADMIN — PANTOPRAZOLE SODIUM 40 MILLIGRAM(S): 20 TABLET, DELAYED RELEASE ORAL at 05:18

## 2020-03-13 RX ADMIN — Medication 30 UNIT(S): at 08:50

## 2020-03-13 RX ADMIN — Medication 3: at 08:50

## 2020-03-13 RX ADMIN — Medication 650 MILLIGRAM(S): at 00:10

## 2020-03-13 RX ADMIN — Medication 650 MILLIGRAM(S): at 08:52

## 2020-03-13 RX ADMIN — BUDESONIDE AND FORMOTEROL FUMARATE DIHYDRATE 2 PUFF(S): 160; 4.5 AEROSOL RESPIRATORY (INHALATION) at 05:20

## 2020-03-13 NOTE — CHART NOTE - NSCHARTNOTEFT_GEN_A_CORE
Spoke with SW to confirm the pharmacy details. who called assisted living , was mentioned that there is medical director who will take care of the medication. Therefore no medication send.

## 2020-03-15 ENCOUNTER — EMERGENCY (EMERGENCY)
Facility: HOSPITAL | Age: 60
LOS: 1 days | Discharge: HOME | End: 2020-03-15
Attending: EMERGENCY MEDICINE
Payer: COMMERCIAL

## 2020-03-15 VITALS
WEIGHT: 225.09 LBS | RESPIRATION RATE: 24 BRPM | OXYGEN SATURATION: 99 % | SYSTOLIC BLOOD PRESSURE: 143 MMHG | TEMPERATURE: 97 F | DIASTOLIC BLOOD PRESSURE: 86 MMHG | HEART RATE: 99 BPM | HEIGHT: 68 IN

## 2020-03-15 PROCEDURE — 99285 EMERGENCY DEPT VISIT HI MDM: CPT

## 2020-03-15 RX ORDER — ACETAMINOPHEN 500 MG
975 TABLET ORAL ONCE
Refills: 0 | Status: COMPLETED | OUTPATIENT
Start: 2020-03-15 | End: 2020-03-15

## 2020-03-15 RX ADMIN — Medication 975 MILLIGRAM(S): at 23:32

## 2020-03-15 NOTE — ED PROVIDER NOTE - NSFOLLOWUPINSTRUCTIONS_ED_ALL_ED_FT
take medications as prescribed.  Followup with PMD for reevaluation.  Return to ED if you develop difficulty breathing.

## 2020-03-15 NOTE — ED PROVIDER NOTE - CLINICAL SUMMARY MEDICAL DECISION MAKING FREE TEXT BOX
Pt presents with 1 day of shortness of breath and chest pain. Will obtain ekg, labs, chest xray and flu screen. Will reassess. Pt presents with 1 day of shortness of breath and chest pain. Will obtain ekg, labs, chest xray and flu screen. Will reassess.    ekg nonischemic, trop neg, proBNp wnl, CXR shows no focal infiltrate, Flu screen negative  discussed above with patient. On reeval patient reports resolution of dyspnea after MDI treatment. Given levaquin for copd exacerbation/sputum production. On reeval no evidence of resp distress/hypoxia on baseline 2.5L NC. Patient stable for discharge back to senior living.

## 2020-03-15 NOTE — ED PROVIDER NOTE - PATIENT PORTAL LINK FT
You can access the FollowMyHealth Patient Portal offered by [facility] by registering at the following website: [email or web address]. By joining [employer]’s FollowMyHealth portal, you will also be able to view your health information using other applications (apps) compatible with our system.

## 2020-03-15 NOTE — ED PROVIDER NOTE - OBJECTIVE STATEMENT
Pt is a 59y M with significant PMHx of copd, cad, dm, gerd, hld and no significant PSHx presenting to the ED with shortness of breath and chest pain. Pt reports symptoms began around 5:30pm and a nurse in the assisted living facility told the pt he was running a fever. Pt states he had associated chest tightness along with a mild cough with little phlegm production and a mild runny nose. Pt states he normally uses 2.5L of oxygen and uses 3-4 nebulizer treatments/day, last received 3 days ago. Pt denies any lower extremity swelling, sick contacts or recent travels. Pt is a smoker, using half a pack of cigarettes per day. Pt has NKDA and currently denies any additional complaints at this time.

## 2020-03-16 VITALS
TEMPERATURE: 98 F | SYSTOLIC BLOOD PRESSURE: 121 MMHG | HEART RATE: 95 BPM | DIASTOLIC BLOOD PRESSURE: 63 MMHG | OXYGEN SATURATION: 99 % | RESPIRATION RATE: 18 BRPM

## 2020-03-16 LAB
ALBUMIN SERPL ELPH-MCNC: 3.7 G/DL — SIGNIFICANT CHANGE UP (ref 3.5–5)
ALP SERPL-CCNC: 83 U/L — SIGNIFICANT CHANGE UP (ref 40–120)
ALT FLD-CCNC: 39 U/L DA — SIGNIFICANT CHANGE UP (ref 10–60)
ANION GAP SERPL CALC-SCNC: 8 MMOL/L — SIGNIFICANT CHANGE UP (ref 5–17)
APTT BLD: 31.4 SEC — SIGNIFICANT CHANGE UP (ref 27.5–36.3)
AST SERPL-CCNC: 16 U/L — SIGNIFICANT CHANGE UP (ref 10–40)
BILIRUB SERPL-MCNC: 0.3 MG/DL — SIGNIFICANT CHANGE UP (ref 0.2–1.2)
BUN SERPL-MCNC: 18 MG/DL — SIGNIFICANT CHANGE UP (ref 7–18)
CALCIUM SERPL-MCNC: 8.5 MG/DL — SIGNIFICANT CHANGE UP (ref 8.4–10.5)
CHLORIDE SERPL-SCNC: 102 MMOL/L — SIGNIFICANT CHANGE UP (ref 96–108)
CO2 SERPL-SCNC: 27 MMOL/L — SIGNIFICANT CHANGE UP (ref 22–31)
CREAT SERPL-MCNC: 0.79 MG/DL — SIGNIFICANT CHANGE UP (ref 0.5–1.3)
D DIMER BLD IA.RAPID-MCNC: <150 NG/ML DDU — SIGNIFICANT CHANGE UP
FLU A RESULT: SIGNIFICANT CHANGE UP
FLU A RESULT: SIGNIFICANT CHANGE UP
FLUAV AG NPH QL: SIGNIFICANT CHANGE UP
FLUBV AG NPH QL: SIGNIFICANT CHANGE UP
GLUCOSE SERPL-MCNC: 294 MG/DL — HIGH (ref 70–99)
HCT VFR BLD CALC: 37 % — LOW (ref 39–50)
HGB BLD-MCNC: 11.7 G/DL — LOW (ref 13–17)
INR BLD: 1.03 RATIO — SIGNIFICANT CHANGE UP (ref 0.88–1.16)
MCHC RBC-ENTMCNC: 28.8 PG — SIGNIFICANT CHANGE UP (ref 27–34)
MCHC RBC-ENTMCNC: 31.6 GM/DL — LOW (ref 32–36)
MCV RBC AUTO: 91.1 FL — SIGNIFICANT CHANGE UP (ref 80–100)
NRBC # BLD: 0 /100 WBCS — SIGNIFICANT CHANGE UP (ref 0–0)
NT-PROBNP SERPL-SCNC: 83 PG/ML — SIGNIFICANT CHANGE UP (ref 0–125)
PLATELET # BLD AUTO: 229 K/UL — SIGNIFICANT CHANGE UP (ref 150–400)
POTASSIUM SERPL-MCNC: 4.6 MMOL/L — SIGNIFICANT CHANGE UP (ref 3.5–5.3)
POTASSIUM SERPL-SCNC: 4.6 MMOL/L — SIGNIFICANT CHANGE UP (ref 3.5–5.3)
PROT SERPL-MCNC: 7.3 G/DL — SIGNIFICANT CHANGE UP (ref 6–8.3)
PROTHROM AB SERPL-ACNC: 11.7 SEC — SIGNIFICANT CHANGE UP (ref 10–12.9)
RBC # BLD: 4.06 M/UL — LOW (ref 4.2–5.8)
RBC # FLD: 13.3 % — SIGNIFICANT CHANGE UP (ref 10.3–14.5)
RSV RESULT: SIGNIFICANT CHANGE UP
RSV RNA RESP QL NAA+PROBE: SIGNIFICANT CHANGE UP
SODIUM SERPL-SCNC: 137 MMOL/L — SIGNIFICANT CHANGE UP (ref 135–145)
TROPONIN I SERPL-MCNC: <0.015 NG/ML — SIGNIFICANT CHANGE UP (ref 0–0.04)
WBC # BLD: 12.28 K/UL — HIGH (ref 3.8–10.5)
WBC # FLD AUTO: 12.28 K/UL — HIGH (ref 3.8–10.5)

## 2020-03-16 PROCEDURE — 80053 COMPREHEN METABOLIC PANEL: CPT

## 2020-03-16 PROCEDURE — 36415 COLL VENOUS BLD VENIPUNCTURE: CPT

## 2020-03-16 PROCEDURE — 85730 THROMBOPLASTIN TIME PARTIAL: CPT

## 2020-03-16 PROCEDURE — 82962 GLUCOSE BLOOD TEST: CPT

## 2020-03-16 PROCEDURE — 85379 FIBRIN DEGRADATION QUANT: CPT

## 2020-03-16 PROCEDURE — 87631 RESP VIRUS 3-5 TARGETS: CPT

## 2020-03-16 PROCEDURE — 71045 X-RAY EXAM CHEST 1 VIEW: CPT

## 2020-03-16 PROCEDURE — 99284 EMERGENCY DEPT VISIT MOD MDM: CPT | Mod: 25

## 2020-03-16 PROCEDURE — 85610 PROTHROMBIN TIME: CPT

## 2020-03-16 PROCEDURE — 84484 ASSAY OF TROPONIN QUANT: CPT

## 2020-03-16 PROCEDURE — 83880 ASSAY OF NATRIURETIC PEPTIDE: CPT

## 2020-03-16 PROCEDURE — 71045 X-RAY EXAM CHEST 1 VIEW: CPT | Mod: 26

## 2020-03-16 PROCEDURE — 85027 COMPLETE CBC AUTOMATED: CPT

## 2020-03-16 PROCEDURE — 94640 AIRWAY INHALATION TREATMENT: CPT

## 2020-03-16 PROCEDURE — 93005 ELECTROCARDIOGRAM TRACING: CPT

## 2020-03-16 RX ORDER — CIPROFLOXACIN LACTATE 400MG/40ML
1 VIAL (ML) INTRAVENOUS
Qty: 7 | Refills: 0
Start: 2020-03-16 | End: 2020-03-22

## 2020-03-16 RX ORDER — IPRATROPIUM BROMIDE 0.2 MG/ML
2 SOLUTION, NON-ORAL INHALATION EVERY 6 HOURS
Refills: 0 | Status: DISCONTINUED | OUTPATIENT
Start: 2020-03-16 | End: 2020-03-19

## 2020-03-16 RX ORDER — ALBUTEROL 90 UG/1
4 AEROSOL, METERED ORAL
Refills: 0 | Status: COMPLETED | OUTPATIENT
Start: 2020-03-16 | End: 2020-03-16

## 2020-03-16 RX ADMIN — ALBUTEROL 4 PUFF(S): 90 AEROSOL, METERED ORAL at 03:25

## 2020-03-16 RX ADMIN — Medication 50 MILLIGRAM(S): at 04:59

## 2020-03-16 RX ADMIN — ALBUTEROL 4 PUFF(S): 90 AEROSOL, METERED ORAL at 03:00

## 2020-03-16 RX ADMIN — Medication 975 MILLIGRAM(S): at 00:08

## 2020-03-16 RX ADMIN — ALBUTEROL 4 PUFF(S): 90 AEROSOL, METERED ORAL at 03:23

## 2020-03-16 RX ADMIN — Medication 2 PUFF(S): at 03:24

## 2020-03-16 NOTE — ED ADULT NURSE NOTE - OBJECTIVE STATEMENT
came from assisted living due to cough , shortness of breath today with back pain. Seen and examined by Dr Miles, oxygen given at 3L/min as ordered.

## 2020-03-16 NOTE — ED ADULT NURSE NOTE - ED STAT RN HANDOFF DETAILS
report given to DICK Alexander , pt await for ambulette, well rested on bed, D/c instructions to be given.

## 2020-03-17 ENCOUNTER — EMERGENCY (EMERGENCY)
Facility: HOSPITAL | Age: 60
LOS: 1 days | Discharge: HOME | End: 2020-03-17
Attending: STUDENT IN AN ORGANIZED HEALTH CARE EDUCATION/TRAINING PROGRAM
Payer: COMMERCIAL

## 2020-03-17 VITALS
HEART RATE: 118 BPM | TEMPERATURE: 99 F | SYSTOLIC BLOOD PRESSURE: 95 MMHG | HEIGHT: 68 IN | OXYGEN SATURATION: 97 % | DIASTOLIC BLOOD PRESSURE: 64 MMHG | WEIGHT: 210.1 LBS | RESPIRATION RATE: 98 BRPM

## 2020-03-17 LAB
ALBUMIN SERPL ELPH-MCNC: 4.4 G/DL — SIGNIFICANT CHANGE UP (ref 3.3–5)
ALP SERPL-CCNC: 78 U/L — SIGNIFICANT CHANGE UP (ref 40–120)
ALT FLD-CCNC: 32 U/L — SIGNIFICANT CHANGE UP (ref 10–45)
ANION GAP SERPL CALC-SCNC: 18 MMOL/L — HIGH (ref 5–17)
APPEARANCE UR: CLEAR — SIGNIFICANT CHANGE UP
APTT BLD: 28.1 SEC — SIGNIFICANT CHANGE UP (ref 27.5–36.3)
AST SERPL-CCNC: 7 U/L — LOW (ref 10–40)
BASE EXCESS BLDV CALC-SCNC: 2.4 MMOL/L — HIGH (ref -2–2)
BASE EXCESS BLDV CALC-SCNC: 3.2 MMOL/L — HIGH (ref -2–2)
BASOPHILS # BLD AUTO: 0.02 K/UL — SIGNIFICANT CHANGE UP (ref 0–0.2)
BASOPHILS NFR BLD AUTO: 0.1 % — SIGNIFICANT CHANGE UP (ref 0–2)
BILIRUB SERPL-MCNC: 0.2 MG/DL — SIGNIFICANT CHANGE UP (ref 0.2–1.2)
BILIRUB UR-MCNC: NEGATIVE — SIGNIFICANT CHANGE UP
BUN SERPL-MCNC: 25 MG/DL — HIGH (ref 7–23)
CA-I SERPL-SCNC: 1.16 MMOL/L — SIGNIFICANT CHANGE UP (ref 1.12–1.3)
CA-I SERPL-SCNC: 1.18 MMOL/L — SIGNIFICANT CHANGE UP (ref 1.12–1.3)
CALCIUM SERPL-MCNC: 9.5 MG/DL — SIGNIFICANT CHANGE UP (ref 8.4–10.5)
CHLORIDE BLDV-SCNC: 99 MMOL/L — SIGNIFICANT CHANGE UP (ref 96–108)
CHLORIDE BLDV-SCNC: 99 MMOL/L — SIGNIFICANT CHANGE UP (ref 96–108)
CHLORIDE SERPL-SCNC: 93 MMOL/L — LOW (ref 96–108)
CO2 BLDV-SCNC: 31 MMOL/L — HIGH (ref 22–30)
CO2 BLDV-SCNC: 31 MMOL/L — HIGH (ref 22–30)
CO2 SERPL-SCNC: 24 MMOL/L — SIGNIFICANT CHANGE UP (ref 22–31)
COLOR SPEC: SIGNIFICANT CHANGE UP
CREAT SERPL-MCNC: 0.98 MG/DL — SIGNIFICANT CHANGE UP (ref 0.5–1.3)
DIFF PNL FLD: NEGATIVE — SIGNIFICANT CHANGE UP
EOSINOPHIL # BLD AUTO: 0.01 K/UL — SIGNIFICANT CHANGE UP (ref 0–0.5)
EOSINOPHIL NFR BLD AUTO: 0.1 % — SIGNIFICANT CHANGE UP (ref 0–6)
GAS PNL BLDV: 129 MMOL/L — LOW (ref 135–145)
GAS PNL BLDV: 133 MMOL/L — LOW (ref 135–145)
GAS PNL BLDV: SIGNIFICANT CHANGE UP
GAS PNL BLDV: SIGNIFICANT CHANGE UP
GLUCOSE BLDV-MCNC: 409 MG/DL — HIGH (ref 70–99)
GLUCOSE BLDV-MCNC: 482 MG/DL — CRITICAL HIGH (ref 70–99)
GLUCOSE SERPL-MCNC: 502 MG/DL — CRITICAL HIGH (ref 70–99)
GLUCOSE UR QL: ABNORMAL
HCO3 BLDV-SCNC: 29 MMOL/L — SIGNIFICANT CHANGE UP (ref 21–29)
HCO3 BLDV-SCNC: 29 MMOL/L — SIGNIFICANT CHANGE UP (ref 21–29)
HCT VFR BLD CALC: 37.4 % — LOW (ref 39–50)
HCT VFR BLDA CALC: 35 % — LOW (ref 39–50)
HCT VFR BLDA CALC: 36 % — LOW (ref 39–50)
HGB BLD CALC-MCNC: 11.4 G/DL — LOW (ref 13–17)
HGB BLD CALC-MCNC: 11.8 G/DL — LOW (ref 13–17)
HGB BLD-MCNC: 11.7 G/DL — LOW (ref 13–17)
IMM GRANULOCYTES NFR BLD AUTO: 0.8 % — SIGNIFICANT CHANGE UP (ref 0–1.5)
INR BLD: 1.11 RATIO — SIGNIFICANT CHANGE UP (ref 0.88–1.16)
KETONES UR-MCNC: NEGATIVE — SIGNIFICANT CHANGE UP
LACTATE BLDV-MCNC: 2.3 MMOL/L — HIGH (ref 0.7–2)
LACTATE BLDV-MCNC: 3.1 MMOL/L — HIGH (ref 0.7–2)
LEUKOCYTE ESTERASE UR-ACNC: NEGATIVE — SIGNIFICANT CHANGE UP
LYMPHOCYTES # BLD AUTO: 1.3 K/UL — SIGNIFICANT CHANGE UP (ref 1–3.3)
LYMPHOCYTES # BLD AUTO: 9.5 % — LOW (ref 13–44)
MCHC RBC-ENTMCNC: 28.1 PG — SIGNIFICANT CHANGE UP (ref 27–34)
MCHC RBC-ENTMCNC: 31.3 GM/DL — LOW (ref 32–36)
MCV RBC AUTO: 89.9 FL — SIGNIFICANT CHANGE UP (ref 80–100)
MONOCYTES # BLD AUTO: 0.98 K/UL — HIGH (ref 0–0.9)
MONOCYTES NFR BLD AUTO: 7.1 % — SIGNIFICANT CHANGE UP (ref 2–14)
NEUTROPHILS # BLD AUTO: 11.33 K/UL — HIGH (ref 1.8–7.4)
NEUTROPHILS NFR BLD AUTO: 82.4 % — HIGH (ref 43–77)
NITRITE UR-MCNC: NEGATIVE — SIGNIFICANT CHANGE UP
NRBC # BLD: 0 /100 WBCS — SIGNIFICANT CHANGE UP (ref 0–0)
PCO2 BLDV: 54 MMHG — HIGH (ref 35–50)
PCO2 BLDV: 60 MMHG — HIGH (ref 35–50)
PH BLDV: 7.31 — LOW (ref 7.35–7.45)
PH BLDV: 7.35 — SIGNIFICANT CHANGE UP (ref 7.35–7.45)
PH UR: 6 — SIGNIFICANT CHANGE UP (ref 5–8)
PLATELET # BLD AUTO: 259 K/UL — SIGNIFICANT CHANGE UP (ref 150–400)
PO2 BLDV: 36 MMHG — SIGNIFICANT CHANGE UP (ref 25–45)
PO2 BLDV: 36 MMHG — SIGNIFICANT CHANGE UP (ref 25–45)
POTASSIUM BLDV-SCNC: 4.1 MMOL/L — SIGNIFICANT CHANGE UP (ref 3.5–5.3)
POTASSIUM BLDV-SCNC: 4.6 MMOL/L — SIGNIFICANT CHANGE UP (ref 3.5–5.3)
POTASSIUM SERPL-MCNC: 4.4 MMOL/L — SIGNIFICANT CHANGE UP (ref 3.5–5.3)
POTASSIUM SERPL-SCNC: 4.4 MMOL/L — SIGNIFICANT CHANGE UP (ref 3.5–5.3)
PROT SERPL-MCNC: 7.1 G/DL — SIGNIFICANT CHANGE UP (ref 6–8.3)
PROT UR-MCNC: NEGATIVE — SIGNIFICANT CHANGE UP
PROTHROM AB SERPL-ACNC: 12.7 SEC — SIGNIFICANT CHANGE UP (ref 10–12.9)
RBC # BLD: 4.16 M/UL — LOW (ref 4.2–5.8)
RBC # FLD: 13.2 % — SIGNIFICANT CHANGE UP (ref 10.3–14.5)
SAO2 % BLDV: 64 % — LOW (ref 67–88)
SAO2 % BLDV: 65 % — LOW (ref 67–88)
SODIUM SERPL-SCNC: 135 MMOL/L — SIGNIFICANT CHANGE UP (ref 135–145)
SP GR SPEC: 1.03 — HIGH (ref 1.01–1.02)
UROBILINOGEN FLD QL: NEGATIVE — SIGNIFICANT CHANGE UP
WBC # BLD: 13.75 K/UL — HIGH (ref 3.8–10.5)
WBC # FLD AUTO: 13.75 K/UL — HIGH (ref 3.8–10.5)

## 2020-03-17 PROCEDURE — 99284 EMERGENCY DEPT VISIT MOD MDM: CPT

## 2020-03-17 PROCEDURE — 93010 ELECTROCARDIOGRAM REPORT: CPT

## 2020-03-17 PROCEDURE — 71045 X-RAY EXAM CHEST 1 VIEW: CPT | Mod: 26

## 2020-03-17 PROCEDURE — 71250 CT THORAX DX C-: CPT | Mod: 26

## 2020-03-17 RX ORDER — ACETAMINOPHEN 500 MG
650 TABLET ORAL ONCE
Refills: 0 | Status: COMPLETED | OUTPATIENT
Start: 2020-03-17 | End: 2020-03-17

## 2020-03-17 RX ORDER — OXYCODONE HYDROCHLORIDE 5 MG/1
5 TABLET ORAL ONCE
Refills: 0 | Status: DISCONTINUED | OUTPATIENT
Start: 2020-03-17 | End: 2020-03-17

## 2020-03-17 RX ORDER — SODIUM CHLORIDE 9 MG/ML
1000 INJECTION INTRAMUSCULAR; INTRAVENOUS; SUBCUTANEOUS ONCE
Refills: 0 | Status: COMPLETED | OUTPATIENT
Start: 2020-03-17 | End: 2020-03-17

## 2020-03-17 RX ORDER — OXYCODONE HYDROCHLORIDE 5 MG/1
5 TABLET ORAL ONCE
Refills: 0 | Status: DISCONTINUED | OUTPATIENT
Start: 2020-03-17 | End: 2020-03-18

## 2020-03-17 RX ORDER — INSULIN LISPRO 100/ML
10 VIAL (ML) SUBCUTANEOUS ONCE
Refills: 0 | Status: COMPLETED | OUTPATIENT
Start: 2020-03-17 | End: 2020-03-17

## 2020-03-17 RX ADMIN — SODIUM CHLORIDE 1000 MILLILITER(S): 9 INJECTION INTRAMUSCULAR; INTRAVENOUS; SUBCUTANEOUS at 19:00

## 2020-03-17 RX ADMIN — Medication 10 UNIT(S): at 20:50

## 2020-03-17 RX ADMIN — Medication 650 MILLIGRAM(S): at 19:10

## 2020-03-17 RX ADMIN — OXYCODONE HYDROCHLORIDE 5 MILLIGRAM(S): 5 TABLET ORAL at 19:10

## 2020-03-17 NOTE — ED PROVIDER NOTE - ATTENDING CONTRIBUTION TO CARE
I performed a history and physical exam of the patient and discussed their management with the PA/NP.  I reviewed the ACP's note and agree with the documented findings and plan of care except as noted below. My medical decision making and observations are as follows:    59y M PMHx COPD on 2.5L at home, CAD, DM2, HLD p/w fever. Pt was seen at [place] ED yesterday for CP/SOB. had negative d-dimer and trop. Pt received a call from [facility] advised to return to the ED due to a change in his CXR read. Initially he was told his CXR was negative, however they were concerned about a possible hilar PNA. Pt reports feeling well, denies fever, SOB, or CP.  Pt was given an abx but does not recall which one.  Pt reports he feels well and was able to "walk 2 blocks, smoke a cigarette and drink gingerale" prior to coming to ER.  denies feeling shortness of breath, chest pain, fevers.  minimal cough.  Pt would like to be able to go home.  Pt in NAD, speaking in full sentences, heart rrr, lungs cta, abd obese ntnd.  Bp soft and HR elevated - will check labs, cultures, CT chest, give gentle hydration and reassess.

## 2020-03-17 NOTE — ED ADULT NURSE REASSESSMENT NOTE - NS ED NURSE REASSESS COMMENT FT1
Report received from DICK Wray, patient alert and oriented being discharged and awaiting ambulette. Patient medicated as per MD orders.

## 2020-03-17 NOTE — ED ADULT NURSE NOTE - NSIMPLEMENTINTERV_GEN_ALL_ED
Implemented All Fall Risk Interventions:  [place] to call system. Call bell, personal items and telephone within reach. Instruct patient to call for assistance. Room bathroom lighting operational. Non-slip footwear when patient is off stretcher. Physically safe environment: no spills, clutter or unnecessary equipment. Stretcher in lowest position, wheels locked, appropriate side rails in place. Provide visual cue, wrist band, yellow gown, etc. Monitor gait and stability. Monitor for mental status changes and reorient to person, place, and time. Review medications for side effects contributing to fall risk. Reinforce activity limits and safety measures with patient and family.

## 2020-03-17 NOTE — ED PROVIDER NOTE - PATIENT PORTAL LINK FT
You can access the FollowMyHealth Patient Portal offered by [facility] by registering at the following website: [email or web address]. By joining [employer]’s FollowMyHealth portal, you will also be able to view your health information using other applications (apps) compatible with our system.

## 2020-03-17 NOTE — ED PROVIDER NOTE - PMH
Bipolar 1 disorder    CAD (coronary artery disease)    COPD (chronic obstructive pulmonary disease)    COPD (chronic obstructive pulmonary disease)    Coronary artery disease involving native coronary artery of native heart without angina pectoris    DM (diabetes mellitus)    Gastroesophageal reflux disease, esophagitis presence not specified    GERD (gastroesophageal reflux disease)    HLD (hyperlipidemia)    HLD (hyperlipidemia)    Insomnia    Oxygen dependent    Polyarthritis    Pulmonary HTN    Smoker    Stented coronary artery    Type 2 diabetes mellitus without complication, with long-term current use of insulin

## 2020-03-17 NOTE — ED PROVIDER NOTE - PROGRESS NOTE DETAILS
Pt is feeling well and eager to go home.  Pt advised to stop taking the steroid due to elevation of sugar and no difficulty breathing.  Pt will continue the albuterol inhaler and states he has his finger stick checked twice daily.  Pt is tolerating PO, ambulating in room without difficulty.  Pt given strict return precautions and understands plan.  - Maryam Robles,

## 2020-03-17 NOTE — ED PROVIDER NOTE - OBJECTIVE STATEMENT
59y M PMHx COPD on 2.5L at home, CAD, DM2, HLD p/w fever. Pt was seen at [place] ED yesterday for CP/SOB. Pt received a call from [facility] advised to return to the ED due to a change in his CXR read. Initially he was told his CXR was negative, however they were concerned about a possible hilar PNA. Pt reports feeling well, denies fever, SOB, or CP. He was given abx at home but cannot recall the name of them. pt also given prednisone. He reports taking a 2-block walk before going to the ED and felt well without SOB/dizziness/LOC. in ED PO2 is % on 2.5L, pt's normal rate at home. Pt admits to smoking 1/2 PPD. He denies known exposure to pts who tested positive for COVID19, and denies recent travel. Denies CP, SOB, fever, palpitations, cough, hemoptysis, leg pain/swelling, N/V/D/C, weakness, or LOC.

## 2020-03-17 NOTE — ED ADULT NURSE NOTE - OBJECTIVE STATEMENT
58 yo male A&OX3 brought in by EMS, presents to the ED with the c/o cough and sob. Pt states that he was seen at [facility] on Sunday, was worked up for sob and sent home on prednisone. Today pt states that he was walking and smoking a cigarette and suddenly felt sob. Pt lives in a assisted living facility and was sent to the ED. Pt denies any fevers, chills, has a slight wet cough, denies any chest pain and chest pressure. Pt has a pmh of COPD currently smokes cigarettes daily. No n/v/d. Lungs, clear, equal b/l.

## 2020-03-18 VITALS
SYSTOLIC BLOOD PRESSURE: 106 MMHG | TEMPERATURE: 97 F | HEART RATE: 74 BPM | DIASTOLIC BLOOD PRESSURE: 81 MMHG | RESPIRATION RATE: 18 BRPM | OXYGEN SATURATION: 100 %

## 2020-03-18 PROBLEM — I25.10 ATHEROSCLEROTIC HEART DISEASE OF NATIVE CORONARY ARTERY WITHOUT ANGINA PECTORIS: Chronic | Status: ACTIVE | Noted: 2020-03-16

## 2020-03-18 PROBLEM — K21.9 GASTRO-ESOPHAGEAL REFLUX DISEASE WITHOUT ESOPHAGITIS: Chronic | Status: ACTIVE | Noted: 2020-03-16

## 2020-03-18 PROBLEM — E11.9 TYPE 2 DIABETES MELLITUS WITHOUT COMPLICATIONS: Chronic | Status: ACTIVE | Noted: 2020-03-16

## 2020-03-18 PROBLEM — G47.00 INSOMNIA, UNSPECIFIED: Chronic | Status: ACTIVE | Noted: 2020-03-16

## 2020-03-18 LAB
CULTURE RESULTS: NO GROWTH — SIGNIFICANT CHANGE UP
SPECIMEN SOURCE: SIGNIFICANT CHANGE UP

## 2020-03-18 PROCEDURE — 83605 ASSAY OF LACTIC ACID: CPT

## 2020-03-18 PROCEDURE — 85014 HEMATOCRIT: CPT

## 2020-03-18 PROCEDURE — 87086 URINE CULTURE/COLONY COUNT: CPT

## 2020-03-18 PROCEDURE — 93005 ELECTROCARDIOGRAM TRACING: CPT

## 2020-03-18 PROCEDURE — 84295 ASSAY OF SERUM SODIUM: CPT

## 2020-03-18 PROCEDURE — 99284 EMERGENCY DEPT VISIT MOD MDM: CPT | Mod: 25

## 2020-03-18 PROCEDURE — 85027 COMPLETE CBC AUTOMATED: CPT

## 2020-03-18 PROCEDURE — 85730 THROMBOPLASTIN TIME PARTIAL: CPT

## 2020-03-18 PROCEDURE — 80053 COMPREHEN METABOLIC PANEL: CPT

## 2020-03-18 PROCEDURE — 82435 ASSAY OF BLOOD CHLORIDE: CPT

## 2020-03-18 PROCEDURE — 87040 BLOOD CULTURE FOR BACTERIA: CPT

## 2020-03-18 PROCEDURE — 84132 ASSAY OF SERUM POTASSIUM: CPT

## 2020-03-18 PROCEDURE — 82962 GLUCOSE BLOOD TEST: CPT

## 2020-03-18 PROCEDURE — 82803 BLOOD GASES ANY COMBINATION: CPT

## 2020-03-18 PROCEDURE — 71045 X-RAY EXAM CHEST 1 VIEW: CPT

## 2020-03-18 PROCEDURE — 82947 ASSAY GLUCOSE BLOOD QUANT: CPT

## 2020-03-18 PROCEDURE — 82330 ASSAY OF CALCIUM: CPT

## 2020-03-18 PROCEDURE — 71250 CT THORAX DX C-: CPT

## 2020-03-18 PROCEDURE — 85610 PROTHROMBIN TIME: CPT

## 2020-03-18 PROCEDURE — 81003 URINALYSIS AUTO W/O SCOPE: CPT

## 2020-03-18 RX ADMIN — OXYCODONE HYDROCHLORIDE 5 MILLIGRAM(S): 5 TABLET ORAL at 00:55

## 2020-03-18 NOTE — ED ADULT NURSE REASSESSMENT NOTE - NS ED NURSE REASSESS COMMENT FT1
Attempted to call report to Kat York [place]iel [place] RN, Spoke with Maria Elena  who states no nurse is on duty on 7am. Patient alert and oriented, report given to DICK Sauceda, aware patient needs to go back to facility with proper forms filled out. Stable while in ER.

## 2020-03-22 LAB
CULTURE RESULTS: SIGNIFICANT CHANGE UP
CULTURE RESULTS: SIGNIFICANT CHANGE UP
SPECIMEN SOURCE: SIGNIFICANT CHANGE UP
SPECIMEN SOURCE: SIGNIFICANT CHANGE UP

## 2020-03-28 ENCOUNTER — INPATIENT (INPATIENT)
Facility: HOSPITAL | Age: 60
LOS: 1 days | Discharge: ANOTHER HOSPITAL | DRG: 871 | End: 2020-03-30
Attending: INTERNAL MEDICINE | Admitting: INTERNAL MEDICINE
Payer: COMMERCIAL

## 2020-03-28 VITALS
OXYGEN SATURATION: 99 % | TEMPERATURE: 98 F | DIASTOLIC BLOOD PRESSURE: 79 MMHG | HEART RATE: 88 BPM | RESPIRATION RATE: 22 BRPM | SYSTOLIC BLOOD PRESSURE: 110 MMHG

## 2020-03-28 LAB
ACETONE SERPL-MCNC: NEGATIVE — SIGNIFICANT CHANGE UP
ALBUMIN SERPL ELPH-MCNC: 3.3 G/DL — LOW (ref 3.5–5)
ALP SERPL-CCNC: 81 U/L — SIGNIFICANT CHANGE UP (ref 40–120)
ALT FLD-CCNC: 31 U/L DA — SIGNIFICANT CHANGE UP (ref 10–60)
ANION GAP SERPL CALC-SCNC: 4 MMOL/L — LOW (ref 5–17)
ANISOCYTOSIS BLD QL: SLIGHT — SIGNIFICANT CHANGE UP
AST SERPL-CCNC: 14 U/L — SIGNIFICANT CHANGE UP (ref 10–40)
BASE EXCESS BLDA CALC-SCNC: 5.2 MMOL/L — HIGH (ref -2–2)
BASOPHILS # BLD AUTO: 0.01 K/UL — SIGNIFICANT CHANGE UP (ref 0–0.2)
BASOPHILS NFR BLD AUTO: 0.2 % — SIGNIFICANT CHANGE UP (ref 0–2)
BILIRUB SERPL-MCNC: 0.2 MG/DL — SIGNIFICANT CHANGE UP (ref 0.2–1.2)
BLOOD GAS COMMENTS ARTERIAL: SIGNIFICANT CHANGE UP
BUN SERPL-MCNC: 21 MG/DL — HIGH (ref 7–18)
CALCIUM SERPL-MCNC: 8.2 MG/DL — LOW (ref 8.4–10.5)
CHLORIDE SERPL-SCNC: 90 MMOL/L — LOW (ref 96–108)
CO2 SERPL-SCNC: 32 MMOL/L — HIGH (ref 22–31)
CREAT SERPL-MCNC: 1.24 MG/DL — SIGNIFICANT CHANGE UP (ref 0.5–1.3)
EOSINOPHIL # BLD AUTO: 0.21 K/UL — SIGNIFICANT CHANGE UP (ref 0–0.5)
EOSINOPHIL NFR BLD AUTO: 3.3 % — SIGNIFICANT CHANGE UP (ref 0–6)
GLUCOSE SERPL-MCNC: 597 MG/DL — CRITICAL HIGH (ref 70–99)
HCO3 BLDA-SCNC: 33 MMOL/L — HIGH (ref 23–27)
HCT VFR BLD CALC: 37.6 % — LOW (ref 39–50)
HGB BLD-MCNC: 12 G/DL — LOW (ref 13–17)
HOROWITZ INDEX BLDA+IHG-RTO: 21 — SIGNIFICANT CHANGE UP
HYPOCHROMIA BLD QL: SLIGHT — SIGNIFICANT CHANGE UP
IMM GRANULOCYTES NFR BLD AUTO: 1.1 % — SIGNIFICANT CHANGE UP (ref 0–1.5)
LACTATE SERPL-SCNC: 1.5 MMOL/L — SIGNIFICANT CHANGE UP (ref 0.7–2)
LYMPHOCYTES # BLD AUTO: 0.62 K/UL — LOW (ref 1–3.3)
LYMPHOCYTES # BLD AUTO: 9.7 % — LOW (ref 13–44)
MAGNESIUM SERPL-MCNC: 2.1 MG/DL — SIGNIFICANT CHANGE UP (ref 1.6–2.6)
MANUAL SMEAR VERIFICATION: SIGNIFICANT CHANGE UP
MCHC RBC-ENTMCNC: 28.6 PG — SIGNIFICANT CHANGE UP (ref 27–34)
MCHC RBC-ENTMCNC: 31.9 GM/DL — LOW (ref 32–36)
MCV RBC AUTO: 89.7 FL — SIGNIFICANT CHANGE UP (ref 80–100)
MICROCYTES BLD QL: SLIGHT — SIGNIFICANT CHANGE UP
MONOCYTES # BLD AUTO: 0.63 K/UL — SIGNIFICANT CHANGE UP (ref 0–0.9)
MONOCYTES NFR BLD AUTO: 9.8 % — SIGNIFICANT CHANGE UP (ref 2–14)
NEUTROPHILS # BLD AUTO: 4.88 K/UL — SIGNIFICANT CHANGE UP (ref 1.8–7.4)
NEUTROPHILS NFR BLD AUTO: 75.9 % — SIGNIFICANT CHANGE UP (ref 43–77)
NRBC # BLD: 0 /100 WBCS — SIGNIFICANT CHANGE UP (ref 0–0)
NT-PROBNP SERPL-SCNC: 45 PG/ML — SIGNIFICANT CHANGE UP (ref 0–125)
PCO2 BLDA: 68 MMHG — HIGH (ref 32–46)
PH BLDA: 7.31 — LOW (ref 7.35–7.45)
PLAT MORPH BLD: NORMAL — SIGNIFICANT CHANGE UP
PLATELET # BLD AUTO: 162 K/UL — SIGNIFICANT CHANGE UP (ref 150–400)
PO2 BLDA: 68 MMHG — LOW (ref 74–108)
POIKILOCYTOSIS BLD QL AUTO: SLIGHT — SIGNIFICANT CHANGE UP
POTASSIUM SERPL-MCNC: 5.3 MMOL/L — SIGNIFICANT CHANGE UP (ref 3.5–5.3)
POTASSIUM SERPL-SCNC: 5.3 MMOL/L — SIGNIFICANT CHANGE UP (ref 3.5–5.3)
PROT SERPL-MCNC: 7 G/DL — SIGNIFICANT CHANGE UP (ref 6–8.3)
RBC # BLD: 4.19 M/UL — LOW (ref 4.2–5.8)
RBC # FLD: 13.5 % — SIGNIFICANT CHANGE UP (ref 10.3–14.5)
RBC BLD AUTO: ABNORMAL
SAO2 % BLDA: 91 % — LOW (ref 92–96)
SODIUM SERPL-SCNC: 126 MMOL/L — LOW (ref 135–145)
TROPONIN I SERPL-MCNC: <0.015 NG/ML — SIGNIFICANT CHANGE UP (ref 0–0.04)
WBC # BLD: 6.42 K/UL — SIGNIFICANT CHANGE UP (ref 3.8–10.5)
WBC # FLD AUTO: 6.42 K/UL — SIGNIFICANT CHANGE UP (ref 3.8–10.5)

## 2020-03-28 PROCEDURE — 71045 X-RAY EXAM CHEST 1 VIEW: CPT | Mod: 26

## 2020-03-28 RX ORDER — INSULIN HUMAN 100 [IU]/ML
18 INJECTION, SOLUTION SUBCUTANEOUS ONCE
Refills: 0 | Status: COMPLETED | OUTPATIENT
Start: 2020-03-28 | End: 2020-03-28

## 2020-03-28 RX ORDER — MAGNESIUM SULFATE 500 MG/ML
2 VIAL (ML) INJECTION ONCE
Refills: 0 | Status: COMPLETED | OUTPATIENT
Start: 2020-03-28 | End: 2020-03-28

## 2020-03-28 RX ORDER — SODIUM CHLORIDE 9 MG/ML
2000 INJECTION INTRAMUSCULAR; INTRAVENOUS; SUBCUTANEOUS ONCE
Refills: 0 | Status: COMPLETED | OUTPATIENT
Start: 2020-03-28 | End: 2020-03-28

## 2020-03-28 RX ORDER — ALBUTEROL 90 UG/1
2 AEROSOL, METERED ORAL
Refills: 0 | Status: DISCONTINUED | OUTPATIENT
Start: 2020-03-28 | End: 2020-03-30

## 2020-03-28 RX ORDER — OXYCODONE AND ACETAMINOPHEN 5; 325 MG/1; MG/1
1 TABLET ORAL ONCE
Refills: 0 | Status: DISCONTINUED | OUTPATIENT
Start: 2020-03-28 | End: 2020-03-28

## 2020-03-28 RX ADMIN — Medication 50 GRAM(S): at 22:25

## 2020-03-28 RX ADMIN — SODIUM CHLORIDE 2000 MILLILITER(S): 9 INJECTION INTRAMUSCULAR; INTRAVENOUS; SUBCUTANEOUS at 22:26

## 2020-03-28 NOTE — ED PROVIDER NOTE - CARE PLAN
Principal Discharge DX:	COPD exacerbation  Secondary Diagnosis:	Hyperglycemia  Secondary Diagnosis:	Dehydration  Secondary Diagnosis:	Hypoxemia

## 2020-03-28 NOTE — ED PROVIDER NOTE - CLINICAL SUMMARY MEDICAL DECISION MAKING FREE TEXT BOX
patient with shortness of breath with history of COPD , will give treatment , get labs, chest x-ray and reassess,

## 2020-03-28 NOTE — ED PROVIDER NOTE - OBJECTIVE STATEMENT
59 y.o male with a PMHx of HTN, HLD, CAD, DM, COPD, bipolar disorder, and a PSHx of a stented coronary artery reports to the ED sent in by assisted living c/o shortness of breath x2 days associated with chest congestion. Patient states that he is on oxygen at home at night. Patient denies any sick contacts. Patient states that he did have his flu vaccine. Patient denies any fever, nausea, vomiting, diarrhea, chest pain or any other acute complaints. NKDA

## 2020-03-29 DIAGNOSIS — E78.5 HYPERLIPIDEMIA, UNSPECIFIED: ICD-10-CM

## 2020-03-29 DIAGNOSIS — E11.9 TYPE 2 DIABETES MELLITUS WITHOUT COMPLICATIONS: ICD-10-CM

## 2020-03-29 DIAGNOSIS — J44.1 CHRONIC OBSTRUCTIVE PULMONARY DISEASE WITH (ACUTE) EXACERBATION: ICD-10-CM

## 2020-03-29 DIAGNOSIS — Z29.9 ENCOUNTER FOR PROPHYLACTIC MEASURES, UNSPECIFIED: ICD-10-CM

## 2020-03-29 LAB
ALBUMIN SERPL ELPH-MCNC: 3.2 G/DL — LOW (ref 3.5–5)
ALP SERPL-CCNC: 76 U/L — SIGNIFICANT CHANGE UP (ref 40–120)
ALT FLD-CCNC: 28 U/L DA — SIGNIFICANT CHANGE UP (ref 10–60)
ANION GAP SERPL CALC-SCNC: 4 MMOL/L — LOW (ref 5–17)
APTT BLD: 27.9 SEC — SIGNIFICANT CHANGE UP (ref 27.5–36.3)
AST SERPL-CCNC: 13 U/L — SIGNIFICANT CHANGE UP (ref 10–40)
BASOPHILS # BLD AUTO: 0 K/UL — SIGNIFICANT CHANGE UP (ref 0–0.2)
BASOPHILS NFR BLD AUTO: 0 % — SIGNIFICANT CHANGE UP (ref 0–2)
BILIRUB SERPL-MCNC: 0.3 MG/DL — SIGNIFICANT CHANGE UP (ref 0.2–1.2)
BUN SERPL-MCNC: 13 MG/DL — SIGNIFICANT CHANGE UP (ref 7–18)
CALCIUM SERPL-MCNC: 8.1 MG/DL — LOW (ref 8.4–10.5)
CHLORIDE SERPL-SCNC: 93 MMOL/L — LOW (ref 96–108)
CHOLEST SERPL-MCNC: 78 MG/DL — SIGNIFICANT CHANGE UP (ref 10–199)
CO2 SERPL-SCNC: 32 MMOL/L — HIGH (ref 22–31)
CREAT SERPL-MCNC: 0.76 MG/DL — SIGNIFICANT CHANGE UP (ref 0.5–1.3)
EOSINOPHIL # BLD AUTO: 0.14 K/UL — SIGNIFICANT CHANGE UP (ref 0–0.5)
EOSINOPHIL NFR BLD AUTO: 2.3 % — SIGNIFICANT CHANGE UP (ref 0–6)
FERRITIN SERPL-MCNC: 183 NG/ML — SIGNIFICANT CHANGE UP (ref 30–400)
GLUCOSE BLDC GLUCOMTR-MCNC: 324 MG/DL — HIGH (ref 70–99)
GLUCOSE BLDC GLUCOMTR-MCNC: 354 MG/DL — HIGH (ref 70–99)
GLUCOSE BLDC GLUCOMTR-MCNC: 356 MG/DL — HIGH (ref 70–99)
GLUCOSE SERPL-MCNC: 303 MG/DL — HIGH (ref 70–99)
HBA1C BLD-MCNC: 10.6 % — HIGH (ref 4–5.6)
HCT VFR BLD CALC: 36.4 % — LOW (ref 39–50)
HDLC SERPL-MCNC: 17 MG/DL — LOW
HGB BLD-MCNC: 11.5 G/DL — LOW (ref 13–17)
IMM GRANULOCYTES NFR BLD AUTO: 0.8 % — SIGNIFICANT CHANGE UP (ref 0–1.5)
INR BLD: 1.01 RATIO — SIGNIFICANT CHANGE UP (ref 0.88–1.16)
LDH SERPL L TO P-CCNC: 148 U/L — SIGNIFICANT CHANGE UP (ref 120–225)
LIPID PNL WITH DIRECT LDL SERPL: 8 MG/DL — SIGNIFICANT CHANGE UP
LYMPHOCYTES # BLD AUTO: 0.96 K/UL — LOW (ref 1–3.3)
LYMPHOCYTES # BLD AUTO: 15.8 % — SIGNIFICANT CHANGE UP (ref 13–44)
MAGNESIUM SERPL-MCNC: 2.1 MG/DL — SIGNIFICANT CHANGE UP (ref 1.6–2.6)
MCHC RBC-ENTMCNC: 28.3 PG — SIGNIFICANT CHANGE UP (ref 27–34)
MCHC RBC-ENTMCNC: 31.6 GM/DL — LOW (ref 32–36)
MCV RBC AUTO: 89.7 FL — SIGNIFICANT CHANGE UP (ref 80–100)
MONOCYTES # BLD AUTO: 0.62 K/UL — SIGNIFICANT CHANGE UP (ref 0–0.9)
MONOCYTES NFR BLD AUTO: 10.2 % — SIGNIFICANT CHANGE UP (ref 2–14)
NEUTROPHILS # BLD AUTO: 4.32 K/UL — SIGNIFICANT CHANGE UP (ref 1.8–7.4)
NEUTROPHILS NFR BLD AUTO: 70.9 % — SIGNIFICANT CHANGE UP (ref 43–77)
NRBC # BLD: 0 /100 WBCS — SIGNIFICANT CHANGE UP (ref 0–0)
PHOSPHATE SERPL-MCNC: 2.5 MG/DL — SIGNIFICANT CHANGE UP (ref 2.5–4.5)
PLATELET # BLD AUTO: 145 K/UL — LOW (ref 150–400)
POTASSIUM SERPL-MCNC: 4.5 MMOL/L — SIGNIFICANT CHANGE UP (ref 3.5–5.3)
POTASSIUM SERPL-SCNC: 4.5 MMOL/L — SIGNIFICANT CHANGE UP (ref 3.5–5.3)
PROCALCITONIN SERPL-MCNC: 0.07 NG/ML — SIGNIFICANT CHANGE UP (ref 0.02–0.1)
PROT SERPL-MCNC: 6.8 G/DL — SIGNIFICANT CHANGE UP (ref 6–8.3)
PROTHROM AB SERPL-ACNC: 11.4 SEC — SIGNIFICANT CHANGE UP (ref 10–12.9)
RBC # BLD: 4.06 M/UL — LOW (ref 4.2–5.8)
RBC # FLD: 13.7 % — SIGNIFICANT CHANGE UP (ref 10.3–14.5)
SARS-COV-2 RNA SPEC QL NAA+PROBE: DETECTED
SODIUM SERPL-SCNC: 129 MMOL/L — LOW (ref 135–145)
TOTAL CHOLESTEROL/HDL RATIO MEASUREMENT: 4.6 RATIO — SIGNIFICANT CHANGE UP (ref 3.4–9.6)
TRIGL SERPL-MCNC: 264 MG/DL — HIGH (ref 10–149)
WBC # BLD: 6.09 K/UL — SIGNIFICANT CHANGE UP (ref 3.8–10.5)
WBC # FLD AUTO: 6.09 K/UL — SIGNIFICANT CHANGE UP (ref 3.8–10.5)

## 2020-03-29 PROCEDURE — 99291 CRITICAL CARE FIRST HOUR: CPT

## 2020-03-29 RX ORDER — GLUCAGON INJECTION, SOLUTION 0.5 MG/.1ML
1 INJECTION, SOLUTION SUBCUTANEOUS ONCE
Refills: 0 | Status: DISCONTINUED | OUTPATIENT
Start: 2020-03-29 | End: 2020-03-30

## 2020-03-29 RX ORDER — INSULIN NPH HUM/REG INSULIN HM 70-30/ML
40 VIAL (ML) SUBCUTANEOUS
Refills: 0 | Status: DISCONTINUED | OUTPATIENT
Start: 2020-03-29 | End: 2020-03-30

## 2020-03-29 RX ORDER — DEXTROSE 50 % IN WATER 50 %
15 SYRINGE (ML) INTRAVENOUS ONCE
Refills: 0 | Status: DISCONTINUED | OUTPATIENT
Start: 2020-03-29 | End: 2020-03-30

## 2020-03-29 RX ORDER — SODIUM CHLORIDE 9 MG/ML
1000 INJECTION, SOLUTION INTRAVENOUS
Refills: 0 | Status: DISCONTINUED | OUTPATIENT
Start: 2020-03-29 | End: 2020-03-30

## 2020-03-29 RX ORDER — INSULIN LISPRO 100/ML
VIAL (ML) SUBCUTANEOUS
Refills: 0 | Status: DISCONTINUED | OUTPATIENT
Start: 2020-03-29 | End: 2020-03-30

## 2020-03-29 RX ORDER — HEPARIN SODIUM 5000 [USP'U]/ML
5000 INJECTION INTRAVENOUS; SUBCUTANEOUS EVERY 8 HOURS
Refills: 0 | Status: DISCONTINUED | OUTPATIENT
Start: 2020-03-29 | End: 2020-03-30

## 2020-03-29 RX ORDER — INSULIN GLARGINE 100 [IU]/ML
15 INJECTION, SOLUTION SUBCUTANEOUS AT BEDTIME
Refills: 0 | Status: DISCONTINUED | OUTPATIENT
Start: 2020-03-29 | End: 2020-03-29

## 2020-03-29 RX ORDER — DEXTROSE 50 % IN WATER 50 %
25 SYRINGE (ML) INTRAVENOUS ONCE
Refills: 0 | Status: DISCONTINUED | OUTPATIENT
Start: 2020-03-29 | End: 2020-03-30

## 2020-03-29 RX ORDER — DEXTROSE 50 % IN WATER 50 %
12.5 SYRINGE (ML) INTRAVENOUS ONCE
Refills: 0 | Status: DISCONTINUED | OUTPATIENT
Start: 2020-03-29 | End: 2020-03-30

## 2020-03-29 RX ORDER — CEFTRIAXONE 500 MG/1
1000 INJECTION, POWDER, FOR SOLUTION INTRAMUSCULAR; INTRAVENOUS EVERY 24 HOURS
Refills: 0 | Status: DISCONTINUED | OUTPATIENT
Start: 2020-03-29 | End: 2020-03-30

## 2020-03-29 RX ORDER — AZITHROMYCIN 500 MG/1
500 TABLET, FILM COATED ORAL EVERY 24 HOURS
Refills: 0 | Status: DISCONTINUED | OUTPATIENT
Start: 2020-03-29 | End: 2020-03-30

## 2020-03-29 RX ORDER — ACETAMINOPHEN 500 MG
650 TABLET ORAL EVERY 6 HOURS
Refills: 0 | Status: DISCONTINUED | OUTPATIENT
Start: 2020-03-29 | End: 2020-03-30

## 2020-03-29 RX ORDER — INSULIN LISPRO 100/ML
10 VIAL (ML) SUBCUTANEOUS ONCE
Refills: 0 | Status: COMPLETED | OUTPATIENT
Start: 2020-03-29 | End: 2020-03-29

## 2020-03-29 RX ORDER — INSULIN LISPRO 100/ML
10 VIAL (ML) SUBCUTANEOUS ONCE
Refills: 0 | Status: DISCONTINUED | OUTPATIENT
Start: 2020-03-29 | End: 2020-03-29

## 2020-03-29 RX ORDER — INSULIN GLARGINE 100 [IU]/ML
15 INJECTION, SOLUTION SUBCUTANEOUS ONCE
Refills: 0 | Status: DISCONTINUED | OUTPATIENT
Start: 2020-03-29 | End: 2020-03-29

## 2020-03-29 RX ORDER — INSULIN NPH HUM/REG INSULIN HM 70-30/ML
20 VIAL (ML) SUBCUTANEOUS
Refills: 0 | Status: DISCONTINUED | OUTPATIENT
Start: 2020-03-29 | End: 2020-03-30

## 2020-03-29 RX ADMIN — Medication 2 GRAM(S): at 01:17

## 2020-03-29 RX ADMIN — AZITHROMYCIN 255 MILLIGRAM(S): 500 TABLET, FILM COATED ORAL at 13:40

## 2020-03-29 RX ADMIN — OXYCODONE AND ACETAMINOPHEN 1 TABLET(S): 5; 325 TABLET ORAL at 01:45

## 2020-03-29 RX ADMIN — ALBUTEROL 2 PUFF(S): 90 AEROSOL, METERED ORAL at 00:39

## 2020-03-29 RX ADMIN — Medication 20 UNIT(S): at 18:57

## 2020-03-29 RX ADMIN — SODIUM CHLORIDE 2000 MILLILITER(S): 9 INJECTION INTRAMUSCULAR; INTRAVENOUS; SUBCUTANEOUS at 01:17

## 2020-03-29 RX ADMIN — CEFTRIAXONE 100 MILLIGRAM(S): 500 INJECTION, POWDER, FOR SOLUTION INTRAMUSCULAR; INTRAVENOUS at 13:40

## 2020-03-29 RX ADMIN — Medication 4: at 19:00

## 2020-03-29 RX ADMIN — Medication 40 MILLIGRAM(S): at 13:38

## 2020-03-29 RX ADMIN — Medication 40 MILLIGRAM(S): at 23:19

## 2020-03-29 RX ADMIN — Medication 10 UNIT(S): at 13:48

## 2020-03-29 RX ADMIN — HEPARIN SODIUM 5000 UNIT(S): 5000 INJECTION INTRAVENOUS; SUBCUTANEOUS at 13:40

## 2020-03-29 RX ADMIN — Medication 5: at 14:26

## 2020-03-29 RX ADMIN — Medication 100 MILLIGRAM(S): at 18:57

## 2020-03-29 RX ADMIN — OXYCODONE AND ACETAMINOPHEN 1 TABLET(S): 5; 325 TABLET ORAL at 00:19

## 2020-03-29 RX ADMIN — INSULIN HUMAN 18 UNIT(S): 100 INJECTION, SOLUTION SUBCUTANEOUS at 00:19

## 2020-03-29 RX ADMIN — Medication 100 MILLIGRAM(S): at 13:39

## 2020-03-29 RX ADMIN — Medication 40 MILLIGRAM(S): at 09:28

## 2020-03-29 RX ADMIN — HEPARIN SODIUM 5000 UNIT(S): 5000 INJECTION INTRAVENOUS; SUBCUTANEOUS at 23:18

## 2020-03-29 NOTE — H&P ADULT - HISTORY OF PRESENT ILLNESS
58 Yo male from assisted living with a PMHx of HTN, HLD, CAD, DM, COPD, bipolar disorder  and a PSHx of a stented CAD reports to the ED  c/o shortness of breath for 2 days associated with chest congestion. Patient states that he is on oxygen at home at night. Patient denies any sick contacts or recent travel.  Patient states that he did have his flu vaccine. Patient denies any fever, nausea, vomiting, diarrhea, chest pain or any other acute complians.     ED course Patient saturates 99% on NC , overnight had WOB on NC , escalated to NRB   GOC : patient is FULL CODE 60 Yo male from assisted living with a PMHx of HTN, HLD, CAD, DM, COPD ( on home oxygen 2L) , bipolar disorder  and a PSHx of a stented CAD reports to the ED  c/o shortness of breath for 2 days associated with chest congestion.Patient states that he is on oxygen at home at night. Patient denies any sick contacts or recent travel.  Patient states that he did have his flu vaccine. Patient denies any fever, nausea, vomiting, diarrhea, chest pain or any other acute complains     ED course Patient saturates 99% on NC , overnight had WOB on NC , escalated to NRB   FS : 597 , regular insulin 18 units was given    GOC : patient is FULL CODE

## 2020-03-29 NOTE — ACUTE INTERFACILITY TRANSFER NOTE - PLAN OF CARE
To be free from infection Acute respiratory distress likely 2/2 CAP superimposed with suspected COVID-19  Currently saturates 99% on NRB  CXR showed bi-lateral infiltration   ABG : Acute respiratory acidosis  Airborne and Droplet precautions pending COVID-19 PCR  Continue azithromycin and rocephin   Supplemental oxygen to maintain saturation more than 94%. Secondary to likely covid 19 Presented with hyperglycemia, and pseudo hyponatremia   Patient  does  not remember his insulin dose, NH transfer note was not in chart  FS on admission 597, 18 units Regular insulin was given on ED, f/u FS  cw Lantus 15 units and sliding scale  A1C from previous admission 8.6

## 2020-03-29 NOTE — H&P ADULT - ASSESSMENT
58 Yo male from assisted living with a PMHx of HTN, HLD, CAD, DM, COPD ( on home oxygen 2L) , bipolar disorder  and a PSHx of a stented CAD reports to the ED  c/o shortness of breath for 2 days associated with chest congestion. admitted For CAP likely due to viral PNA (COVID)

## 2020-03-29 NOTE — H&P ADULT - PROBLEM SELECTOR PLAN 2
IDDMT2 , presented with hyperglycemia, and pseudo hyponatremia    patient  does  not remember his insulin dose, NH transfer note was not in chart  - FS on admission 597, 18 units Regular insulin was given on ED, f/u FS   - cw Lantus 15 units  - c/w HSS  - f/u A1C

## 2020-03-29 NOTE — CONSULT NOTE ADULT - SUBJECTIVE AND OBJECTIVE BOX
Mr. Sumner is a pleasant 59 year old  male w/ PMHx of HTN, HLD, CAD, DM, COPD ( on home oxygen 2L) , bipolar disorder  and a PSHx of a stented CAD reports to the ED  c/o shortness of breath for 2 days associated with chest congestion. Patient states that he is on oxygen at home at night. Patient denies any sick contacts or recent travel. Patient states that he did have his flu vaccine. Patient denies any fever, nausea, vomiting, diarrhea, chest pain or any other acute complaints. Overnight patient desaturated and oxygen therapy escalated - ICU consulted for acute hypoxic respiratory failure.    PAST MEDICAL & SURGICAL HISTORY:  Polyarthritis  Insomnia  HLD (hyperlipidemia)  GERD (gastroesophageal reflux disease)  CAD (coronary artery disease)  DM (diabetes mellitus)  COPD (chronic obstructive pulmonary disease)  Oxygen dependent  Gastroesophageal reflux disease, esophagitis presence not specified  Smoker  Bipolar 1 disorder  Coronary artery disease involving native coronary artery of native heart without angina pectoris  Type 2 diabetes mellitus without complication, with long-term current use of insulin  Pulmonary HTN  HLD (hyperlipidemia)  Stented coronary artery  COPD (chronic obstructive pulmonary disease)  No significant past surgical history  No significant past surgical history    FAMILY HISTORY:  No family history of mental disorder  No family history of hypertension  No family history of chronic obstructive pulmonary disease  No family history of cardiovascular disease    MEDICATIONS  (STANDING):  azithromycin  IVPB 500 milliGRAM(s) IV Intermittent every 24 hours  cefTRIAXone   IVPB 1000 milliGRAM(s) IV Intermittent every 24 hours  dextrose 5%. 1000 milliLiter(s) (50 mL/Hr) IV Continuous <Continuous>  dextrose 5%. 1000 milliLiter(s) (50 mL/Hr) IV Continuous <Continuous>  dextrose 50% Injectable 12.5 Gram(s) IV Push once  dextrose 50% Injectable 25 Gram(s) IV Push once  dextrose 50% Injectable 12.5 Gram(s) IV Push once  dextrose 50% Injectable 25 Gram(s) IV Push once  dextrose 50% Injectable 25 Gram(s) IV Push once  guaiFENesin   Syrup  (Sugar-Free) 100 milliGRAM(s) Oral every 6 hours  heparin  Injectable 5000 Unit(s) SubCutaneous every 8 hours  insulin lispro (HumaLOG) corrective regimen sliding scale   SubCutaneous three times a day before meals  insulin NPH/regular 70/30 Injectable 40 Unit(s) SubCutaneous before breakfast  insulin NPH/regular 70/30 Injectable 20 Unit(s) SubCutaneous before dinner  methylPREDNISolone sodium succinate Injectable 40 milliGRAM(s) IV Push every 8 hours    MEDICATIONS  (PRN):  acetaminophen    Suspension .. 650 milliGRAM(s) Oral every 6 hours PRN Temp greater or equal to 38C (100.4F), Mild Pain (1 - 3)  ALBUTerol    90 MICROgram(s) HFA Inhaler 2 Puff(s) Inhalation every 15 minutes PRN Shortness of Breath  dextrose 40% Gel 15 Gram(s) Oral once PRN Blood Glucose LESS THAN 70 milliGRAM(s)/deciliter  glucagon  Injectable 1 milliGRAM(s) IntraMuscular once PRN Glucose LESS THAN 70 milligrams/deciliter      Vital Signs Last 24 Hrs  T(C): 36.6 (29 Mar 2020 07:35), Max: 36.6 (28 Mar 2020 19:44)  T(F): 97.8 (29 Mar 2020 07:35), Max: 97.9 (28 Mar 2020 19:44)  HR: 106 (29 Mar 2020 07:35) (88 - 106)  BP: 120/72 (29 Mar 2020 07:35) (110/79 - 120/72)  BP(mean): --  RR: 20 (29 Mar 2020 07:35) (20 - 22)  SpO2: 100% (29 Mar 2020 07:35) (99% - 100%)  CBC Full  -  ( 29 Mar 2020 11:19 )  WBC Count : 6.09 K/uL  RBC Count : 4.06 M/uL  Hemoglobin : 11.5 g/dL  Hematocrit : 36.4 %  Platelet Count - Automated : 145 K/uL  Mean Cell Volume : 89.7 fl  Mean Cell Hemoglobin : 28.3 pg  Mean Cell Hemoglobin Concentration : 31.6 gm/dL  Auto Neutrophil # : 4.32 K/uL  Auto Lymphocyte # : 0.96 K/uL  Auto Monocyte # : 0.62 K/uL  Auto Eosinophil # : 0.14 K/uL  Auto Basophil # : 0.00 K/uL  Auto Neutrophil % : 70.9 %  Auto Lymphocyte % : 15.8 %  Auto Monocyte % : 10.2 %  Auto Eosinophil % : 2.3 %  Auto Basophil % : 0.0 %    PT/INR - ( 29 Mar 2020 11:19 )   PT: 11.4 sec;   INR: 1.01 ratio         PTT - ( 29 Mar 2020 11:19 )  PTT:27.9 sec  03-29    129<L>  |  93<L>  |  13  ----------------------------<  303<H>  4.5   |  32<H>  |  0.76    Ca    8.1<L>      29 Mar 2020 11:19  Phos  2.5     03-29  Mg     2.1     03-29    TPro  6.8  /  Alb  3.2<L>  /  TBili  0.3  /  DBili  x   /  AST  13  /  ALT  28  /  AlkPhos  76  03-29      REVIEW OF SYSTEMS      General:	    Skin/Breast:  	  Ophthalmologic:  	  ENMT:	    Respiratory and Thorax:  	  Cardiovascular:	    Gastrointestinal:	    Genitourinary:	    Musculoskeletal:	    Neurological:	    Psychiatric:	    Hematology/Lymphatics:	    Endocrine:	    Allergic/Immunologic:	    Physical Exam:   Constitutional: NAD, well-groomed, well-developed  HEENT: PERRLA, EOMI, no drainage or redness  Neck: No bruits; no thyromegaly or nodules,  No JVD  Back: Normal spine flexure, No CVA tenderness, No deformity or limitation of movement  Respiratory: Breath Sounds equal & clear to percussion & auscultation, no accessory muscle use  Cardiovascular: Regular rate & rhythm, normal S1, S2; no murmurs, gallops or rubs; no S3, S4  Gastrointestinal: Soft, non-tender, non distended no hepatosplenomegaly, normal bowel sounds  Extremities: No peripheral edema, No cyanosis, clubbing   Vascular: Equal and normal pulses: 2+ peripheral pulses throughout  Neurological: A&O x 3; no sensory, motor  deficits, normal reflexes  Psychiatric: Normal mood, normal affect  Musculoskeletal: No joint pain, swelling or deformity; no limitation of movement  Skin: No rashes Mr. Sumner is a pleasant 59 year old  male w/ PMHx of HTN, HLD, CAD, DM, COPD ( on home oxygen 2L) , bipolar disorder  and a PSHx of a stented CAD reports to the ED  c/o shortness of breath for 2 days associated with chest congestion. Patient states that he is on oxygen at home at night. Patient denies any sick contacts or recent travel. Patient states that he did have his flu vaccine. Patient denies any fever, nausea, vomiting, diarrhea, chest pain or any other acute complaints. Overnight patient desaturated and oxygen therapy escalated - ICU consulted for acute hypoxic respiratory failure.    REVIEW OF SYSTEMS:  CONSTITUTIONAL: No fever, weight loss, or fatigue  EYES: No eye pain, visual disturbances, or discharge  ENMT:  No difficulty hearing, tinnitus, vertigo; No sinus or throat pain  NECK: No pain or stiffness  BREASTS: No pain, masses, or nipple discharge  RESPIRATORY: + cough and SOB  CARDIOVASCULAR: No chest pain, palpitations, dizziness, or leg swelling  GASTROINTESTINAL: No abdominal or epigastric pain. No nausea, vomiting, or hematemesis; No diarrhea or constipation. No melena or hematochezia.  GENITOURINARY: No dysuria, frequency, hematuria, or incontinence  NEUROLOGICAL: No headaches, memory loss, loss of strength, numbness, or tremors  SKIN: No itching, burning, rashes, or lesions   LYMPH NODES: No enlarged glands  ENDOCRINE: No heat or cold intolerance; No hair loss  MUSCULOSKELETAL: No joint pain or swelling; No muscle, back, or extremity pain  PSYCHIATRIC: No depression, anxiety, mood swings, or difficulty sleeping  HEME/LYMPH: No easy bruising, or bleeding gums    PAST MEDICAL & SURGICAL HISTORY:  Polyarthritis  Insomnia  HLD (hyperlipidemia)  GERD (gastroesophageal reflux disease)  CAD (coronary artery disease)  DM (diabetes mellitus)  COPD (chronic obstructive pulmonary disease)  Oxygen dependent  Gastroesophageal reflux disease, esophagitis presence not specified  Smoker  Bipolar 1 disorder  Coronary artery disease involving native coronary artery of native heart without angina pectoris  Type 2 diabetes mellitus without complication, with long-term current use of insulin  Pulmonary HTN  HLD (hyperlipidemia)  Stented coronary artery  COPD (chronic obstructive pulmonary disease)  No significant past surgical history  No significant past surgical history    FAMILY HISTORY:  No family history of mental disorder  No family history of hypertension  No family history of chronic obstructive pulmonary disease  No family history of cardiovascular disease    MEDICATIONS  (STANDING):  azithromycin  IVPB 500 milliGRAM(s) IV Intermittent every 24 hours  cefTRIAXone   IVPB 1000 milliGRAM(s) IV Intermittent every 24 hours  dextrose 5%. 1000 milliLiter(s) (50 mL/Hr) IV Continuous <Continuous>  dextrose 5%. 1000 milliLiter(s) (50 mL/Hr) IV Continuous <Continuous>  dextrose 50% Injectable 12.5 Gram(s) IV Push once  dextrose 50% Injectable 25 Gram(s) IV Push once  dextrose 50% Injectable 12.5 Gram(s) IV Push once  dextrose 50% Injectable 25 Gram(s) IV Push once  dextrose 50% Injectable 25 Gram(s) IV Push once  guaiFENesin   Syrup  (Sugar-Free) 100 milliGRAM(s) Oral every 6 hours  heparin  Injectable 5000 Unit(s) SubCutaneous every 8 hours  insulin lispro (HumaLOG) corrective regimen sliding scale   SubCutaneous three times a day before meals  insulin NPH/regular 70/30 Injectable 40 Unit(s) SubCutaneous before breakfast  insulin NPH/regular 70/30 Injectable 20 Unit(s) SubCutaneous before dinner  methylPREDNISolone sodium succinate Injectable 40 milliGRAM(s) IV Push every 8 hours    MEDICATIONS  (PRN):  acetaminophen    Suspension .. 650 milliGRAM(s) Oral every 6 hours PRN Temp greater or equal to 38C (100.4F), Mild Pain (1 - 3)  ALBUTerol    90 MICROgram(s) HFA Inhaler 2 Puff(s) Inhalation every 15 minutes PRN Shortness of Breath  dextrose 40% Gel 15 Gram(s) Oral once PRN Blood Glucose LESS THAN 70 milliGRAM(s)/deciliter  glucagon  Injectable 1 milliGRAM(s) IntraMuscular once PRN Glucose LESS THAN 70 milligrams/deciliter    Vital Signs Last 24 Hrs  T(C): 36.6 (29 Mar 2020 07:35), Max: 36.6 (28 Mar 2020 19:44)  T(F): 97.8 (29 Mar 2020 07:35), Max: 97.9 (28 Mar 2020 19:44)  HR: 106 (29 Mar 2020 07:35) (88 - 106)  BP: 120/72 (29 Mar 2020 07:35) (110/79 - 120/72)  BP(mean): --  RR: 20 (29 Mar 2020 07:35) (20 - 22)  SpO2: 100% (29 Mar 2020 07:35) (99% - 100%)    PHYSICAL EXAM  General: A&Ox3; mild increased WOB  Head: NC/AT; PERRL; EOMI; anicteric sclera  Neck: Supple; no JVD  Respiratory: CTA B/L; faint rhonchi/wheeze  Cardiovascular: Regular rhythm/rate; S1/S2; no gallops or murmurs auscultated  Gastrointestinal: Soft; NTND w/out rebound tenderness or guarding; bowel sounds normal  Extremities: WWP; no edema or cyanosis; radial/pedal pulses palpable  Neurological: CNII-XII grossly intact; no obvious focal deficits  Skin: intact    CBC Full  -  ( 29 Mar 2020 11:19 )  WBC Count : 6.09 K/uL  RBC Count : 4.06 M/uL  Hemoglobin : 11.5 g/dL  Hematocrit : 36.4 %  Platelet Count - Automated : 145 K/uL  Mean Cell Volume : 89.7 fl  Mean Cell Hemoglobin : 28.3 pg  Mean Cell Hemoglobin Concentration : 31.6 gm/dL  Auto Neutrophil # : 4.32 K/uL  Auto Lymphocyte # : 0.96 K/uL  Auto Monocyte # : 0.62 K/uL  Auto Eosinophil # : 0.14 K/uL  Auto Basophil # : 0.00 K/uL  Auto Neutrophil % : 70.9 %  Auto Lymphocyte % : 15.8 %  Auto Monocyte % : 10.2 %  Auto Eosinophil % : 2.3 %  Auto Basophil % : 0.0 %    PT/INR - ( 29 Mar 2020 11:19 )   PT: 11.4 sec;   INR: 1.01 ratio         PTT - ( 29 Mar 2020 11:19 )  PTT:27.9 sec  03-29    129<L>  |  93<L>  |  13  ----------------------------<  303<H>  4.5   |  32<H>  |  0.76    Ca    8.1<L>      29 Mar 2020 11:19  Phos  2.5     03-29  Mg     2.1     03-29    TPro  6.8  /  Alb  3.2<L>  /  TBili  0.3  /  DBili  x   /  AST  13  /  ALT  28  /  AlkPhos  76  03-29

## 2020-03-29 NOTE — CONSULT NOTE ADULT - ATTENDING COMMENTS
Mr. Sumner is a pleasant 59 year old  male w/ PMHx of HTN, HLD, CAD, DM, COPD ( on home oxygen 2L) , bipolar disorder  and a PSHx of a stented CAD reports to the ED  c/o shortness of breath for 2 days associated with chest congestion. Patient states that he is on oxygen at home at night. Patient denies any sick contacts or recent travel. Patient states that he did have his flu vaccine. Patient denies any fever, nausea, vomiting, diarrhea, chest pain or any other acute complaints. Overnight patient desaturated and oxygen therapy escalated - ICU consulted for acute hypoxic respiratory failure.    Assessment and Disposition  - Likely 2/2 COPD exacerbation versus viral PNA. Patient in NAD, saturating 98% on 10 L NC, speaking in full sentences, and states improvement of symptoms. Goal oxygen 88-92 give home oxygen requirements, titrate down PRN. No current indication for ICU level of care. Reconsult PRN

## 2020-03-29 NOTE — H&P ADULT - PROBLEM SELECTOR PLAN 1
Acute respiratory distress likely 2/2 CAP superimposed with suspected COVID-19  - p/w SOB X2 days and lymphopenia  - Currently saturates 99% on NRB  - CXR showed bi-lateral infiltration   - f/u FLu panel , RVP, COVID- pcr, LDH , T cell subset , D-dimer, CPK , troponins  - ABG : Acute respiratory acidosis  - Airborne and Droplet precautions pending COVID-19 PCR  - IV Antibiotics Rocephin , oral zithromax  Supplemental oxygen to maintain saturation more than 94%.

## 2020-03-29 NOTE — ACUTE INTERFACILITY TRANSFER NOTE - SECONDARY DIAGNOSIS.
COPD exacerbation Type 2 diabetes mellitus without complication, with long-term current use of insulin

## 2020-03-29 NOTE — CONSULT NOTE ADULT - ASSESSMENT
Mr. Sumner is a pleasant 59 year old  male w/ PMHx of HTN, HLD, CAD, DM, COPD ( on home oxygen 2L) , bipolar disorder  and a PSHx of a stented CAD reports to the ED  c/o shortness of breath for 2 days associated with chest congestion. Patient states that he is on oxygen at home at night. Patient denies any sick contacts or recent travel. Patient states that he did have his flu vaccine. Patient denies any fever, nausea, vomiting, diarrhea, chest pain or any other acute complaints. Overnight patient desaturated and oxygen therapy escalated - ICU consulted for acute hypoxic respiratory failure.    Assessment and Disposition  - Likely 2/2 COPD exacerbation versus viral PNA. Patient in NAD, [id number]% on 10 L NC, speaking in full sentences, and states improvement. No current indication for ICU level of care. Mr. Sumner is a pleasant 59 year old  male w/ PMHx of HTN, HLD, CAD, DM, COPD ( on home oxygen 2L) , bipolar disorder  and a PSHx of a stented CAD reports to the ED  c/o shortness of breath for 2 days associated with chest congestion. Patient states that he is on oxygen at home at night. Patient denies any sick contacts or recent travel. Patient states that he did have his flu vaccine. Patient denies any fever, nausea, vomiting, diarrhea, chest pain or any other acute complaints. Overnight patient desaturated and oxygen therapy escalated - ICU consulted for acute hypoxic respiratory failure.    Assessment and Disposition  - Likely 2/2 COPD exacerbation versus viral PNA. Patient in NAD, saturating 98% on 10 L NC, speaking in full sentences, and states improvement of symptoms. No current indication for ICU level of care. Reconsult PRN Mr. Sumner is a pleasant 59 year old  male w/ PMHx of HTN, HLD, CAD, DM, COPD ( on home oxygen 2L) , bipolar disorder  and a PSHx of a stented CAD reports to the ED  c/o shortness of breath for 2 days associated with chest congestion. Patient states that he is on oxygen at home at night. Patient denies any sick contacts or recent travel. Patient states that he did have his flu vaccine. Patient denies any fever, nausea, vomiting, diarrhea, chest pain or any other acute complaints. Overnight patient desaturated and oxygen therapy escalated - ICU consulted for acute hypoxic respiratory failure.    Assessment and Disposition  - Likely 2/2 COPD exacerbation versus viral PNA. Patient in NAD, saturating 98% on 10 L NC, speaking in full sentences, and states improvement of symptoms. Goal oxygen 88-92 give home oxygen requirements, titrate down PRN. No current indication for ICU level of care. Reconsult PRN

## 2020-03-29 NOTE — H&P ADULT - NSICDXPASTMEDICALHX_GEN_ALL_CORE_FT
PAST MEDICAL HISTORY:  Bipolar 1 disorder     CAD (coronary artery disease)     COPD (chronic obstructive pulmonary disease)     COPD (chronic obstructive pulmonary disease)     Coronary artery disease involving native coronary artery of native heart without angina pectoris     DM (diabetes mellitus)     Gastroesophageal reflux disease, esophagitis presence not specified     GERD (gastroesophageal reflux disease)     HLD (hyperlipidemia)     HLD (hyperlipidemia)     Insomnia     Oxygen dependent     Polyarthritis     Pulmonary HTN     Smoker     Stented coronary artery     Type 2 diabetes mellitus without complication, with long-term current use of insulin

## 2020-03-30 VITALS
HEART RATE: 102 BPM | SYSTOLIC BLOOD PRESSURE: 129 MMHG | DIASTOLIC BLOOD PRESSURE: 68 MMHG | OXYGEN SATURATION: 97 % | RESPIRATION RATE: 28 BRPM | TEMPERATURE: 98 F

## 2020-03-30 LAB
ANION GAP SERPL CALC-SCNC: 4 MMOL/L — LOW (ref 5–17)
ANION GAP SERPL CALC-SCNC: 7 MMOL/L — SIGNIFICANT CHANGE UP (ref 5–17)
BUN SERPL-MCNC: 16 MG/DL — SIGNIFICANT CHANGE UP (ref 7–18)
BUN SERPL-MCNC: 18 MG/DL — SIGNIFICANT CHANGE UP (ref 7–18)
CALCIUM SERPL-MCNC: 8.3 MG/DL — LOW (ref 8.4–10.5)
CALCIUM SERPL-MCNC: 8.5 MG/DL — SIGNIFICANT CHANGE UP (ref 8.4–10.5)
CHLORIDE SERPL-SCNC: 91 MMOL/L — LOW (ref 96–108)
CHLORIDE SERPL-SCNC: 93 MMOL/L — LOW (ref 96–108)
CO2 SERPL-SCNC: 33 MMOL/L — HIGH (ref 22–31)
CO2 SERPL-SCNC: 35 MMOL/L — HIGH (ref 22–31)
CREAT SERPL-MCNC: 0.63 MG/DL — SIGNIFICANT CHANGE UP (ref 0.5–1.3)
CREAT SERPL-MCNC: 0.71 MG/DL — SIGNIFICANT CHANGE UP (ref 0.5–1.3)
GLUCOSE BLDC GLUCOMTR-MCNC: 275 MG/DL — HIGH (ref 70–99)
GLUCOSE BLDC GLUCOMTR-MCNC: 326 MG/DL — HIGH (ref 70–99)
GLUCOSE SERPL-MCNC: 266 MG/DL — HIGH (ref 70–99)
GLUCOSE SERPL-MCNC: 310 MG/DL — HIGH (ref 70–99)
HCT VFR BLD CALC: 39.2 % — SIGNIFICANT CHANGE UP (ref 39–50)
HGB BLD-MCNC: 12.2 G/DL — LOW (ref 13–17)
MCHC RBC-ENTMCNC: 28.4 PG — SIGNIFICANT CHANGE UP (ref 27–34)
MCHC RBC-ENTMCNC: 31.1 GM/DL — LOW (ref 32–36)
MCV RBC AUTO: 91.2 FL — SIGNIFICANT CHANGE UP (ref 80–100)
NRBC # BLD: 0 /100 WBCS — SIGNIFICANT CHANGE UP (ref 0–0)
PLATELET # BLD AUTO: 134 K/UL — LOW (ref 150–400)
POTASSIUM SERPL-MCNC: 5.4 MMOL/L — HIGH (ref 3.5–5.3)
POTASSIUM SERPL-MCNC: 5.5 MMOL/L — HIGH (ref 3.5–5.3)
POTASSIUM SERPL-SCNC: 5.4 MMOL/L — HIGH (ref 3.5–5.3)
POTASSIUM SERPL-SCNC: 5.5 MMOL/L — HIGH (ref 3.5–5.3)
RBC # BLD: 4.3 M/UL — SIGNIFICANT CHANGE UP (ref 4.2–5.8)
RBC # FLD: 13.2 % — SIGNIFICANT CHANGE UP (ref 10.3–14.5)
SODIUM SERPL-SCNC: 131 MMOL/L — LOW (ref 135–145)
SODIUM SERPL-SCNC: 132 MMOL/L — LOW (ref 135–145)
WBC # BLD: 5.18 K/UL — SIGNIFICANT CHANGE UP (ref 3.8–10.5)
WBC # FLD AUTO: 5.18 K/UL — SIGNIFICANT CHANGE UP (ref 3.8–10.5)

## 2020-03-30 PROCEDURE — 99291 CRITICAL CARE FIRST HOUR: CPT

## 2020-03-30 PROCEDURE — 85610 PROTHROMBIN TIME: CPT

## 2020-03-30 PROCEDURE — 93005 ELECTROCARDIOGRAM TRACING: CPT

## 2020-03-30 PROCEDURE — 82962 GLUCOSE BLOOD TEST: CPT

## 2020-03-30 PROCEDURE — 85027 COMPLETE CBC AUTOMATED: CPT

## 2020-03-30 PROCEDURE — 80061 LIPID PANEL: CPT

## 2020-03-30 PROCEDURE — 84145 PROCALCITONIN (PCT): CPT

## 2020-03-30 PROCEDURE — 83036 HEMOGLOBIN GLYCOSYLATED A1C: CPT

## 2020-03-30 PROCEDURE — 36415 COLL VENOUS BLD VENIPUNCTURE: CPT

## 2020-03-30 PROCEDURE — 85730 THROMBOPLASTIN TIME PARTIAL: CPT

## 2020-03-30 PROCEDURE — 82728 ASSAY OF FERRITIN: CPT

## 2020-03-30 PROCEDURE — 71045 X-RAY EXAM CHEST 1 VIEW: CPT

## 2020-03-30 PROCEDURE — 80048 BASIC METABOLIC PNL TOTAL CA: CPT

## 2020-03-30 PROCEDURE — 84100 ASSAY OF PHOSPHORUS: CPT

## 2020-03-30 PROCEDURE — 99053 MED SERV 10PM-8AM 24 HR FAC: CPT

## 2020-03-30 PROCEDURE — 87635 SARS-COV-2 COVID-19 AMP PRB: CPT

## 2020-03-30 PROCEDURE — 84484 ASSAY OF TROPONIN QUANT: CPT

## 2020-03-30 PROCEDURE — 80053 COMPREHEN METABOLIC PANEL: CPT

## 2020-03-30 PROCEDURE — 83605 ASSAY OF LACTIC ACID: CPT

## 2020-03-30 PROCEDURE — 94640 AIRWAY INHALATION TREATMENT: CPT

## 2020-03-30 PROCEDURE — 82009 KETONE BODYS QUAL: CPT

## 2020-03-30 PROCEDURE — 87040 BLOOD CULTURE FOR BACTERIA: CPT

## 2020-03-30 PROCEDURE — 82803 BLOOD GASES ANY COMBINATION: CPT

## 2020-03-30 PROCEDURE — 83615 LACTATE (LD) (LDH) ENZYME: CPT

## 2020-03-30 PROCEDURE — 83880 ASSAY OF NATRIURETIC PEPTIDE: CPT

## 2020-03-30 PROCEDURE — 83735 ASSAY OF MAGNESIUM: CPT

## 2020-03-30 RX ADMIN — CEFTRIAXONE 100 MILLIGRAM(S): 500 INJECTION, POWDER, FOR SOLUTION INTRAMUSCULAR; INTRAVENOUS at 12:40

## 2020-03-30 RX ADMIN — Medication 40 MILLIGRAM(S): at 06:21

## 2020-03-30 RX ADMIN — Medication 4: at 09:18

## 2020-03-30 RX ADMIN — Medication 100 MILLIGRAM(S): at 06:21

## 2020-03-30 RX ADMIN — AZITHROMYCIN 255 MILLIGRAM(S): 500 TABLET, FILM COATED ORAL at 12:40

## 2020-03-30 RX ADMIN — Medication 40 MILLIGRAM(S): at 12:40

## 2020-03-30 RX ADMIN — Medication 3: at 12:40

## 2020-03-30 RX ADMIN — HEPARIN SODIUM 5000 UNIT(S): 5000 INJECTION INTRAVENOUS; SUBCUTANEOUS at 06:21

## 2020-03-30 RX ADMIN — Medication 100 MILLIGRAM(S): at 01:13

## 2020-03-30 RX ADMIN — HEPARIN SODIUM 5000 UNIT(S): 5000 INJECTION INTRAVENOUS; SUBCUTANEOUS at 12:40

## 2020-03-30 RX ADMIN — Medication 40 UNIT(S): at 09:19

## 2020-11-03 NOTE — ED PROVIDER NOTE - CONDITION AT DISCHARGE:
Improved Ketoconazole Counseling:   Patient counseled regarding improving absorption with orange juice.  Adverse effects include but are not limited to breast enlargement, headache, diarrhea, nausea, upset stomach, liver function test abnormalities, taste disturbance, and stomach pain.  There is a rare possibility of liver failure that can occur when taking ketoconazole. The patient understands that monitoring of LFTs may be required, especially at baseline. The patient verbalized understanding of the proper use and possible adverse effects of ketoconazole.  All of the patient's questions and concerns were addressed.

## 2020-11-11 NOTE — ED ADULT NURSE REASSESSMENT NOTE - NS ED NURSE REASSESS COMMENT FT1
order received to trial pt off O2 and monitor O2 sats. Pt sleeping maintaining O2 saturation of 86-89% on room air. PA made aware. Patient

## 2021-01-11 NOTE — PROGRESS NOTE ADULT - PROBLEM/PLAN-8
DISPLAY PLAN FREE TEXT Melolabial Interpolation Flap Division And Inset Text: Division and inset of the melolabial interpolation flap was performed to achieve optimal aesthetic result, restore normal anatomic appearance and avoid distortion of normal anatomy, expedite and facilitate wound healing, achieve optimal functional result and because linear closure either not possible or would produce suboptimal result. The patient was prepped and draped in the usual manner. The pedicle was infiltrated with local anesthesia. The pedicle was sectioned with a #15 blade. The pedicle was de-bulked and trimmed to match the shape of the defect. Hemostasis was achieved. The flap donor site and free margin of the flap were secured with deep buried sutures and the wound edges were re-approximated.

## 2021-04-07 NOTE — DIETITIAN INITIAL EVALUATION ADULT. - PROBLEM SELECTOR PLAN 6
Pt brought by  EMS. Per EMS pt is normally alert speaks clearly and ambulates. Care givers checked on pt. PTA and pt had trouble expressing herself. Pt denies headache pain. No nausea dizziness or blurred vision.  
- will continue to treat with pepcid

## 2021-05-26 NOTE — PROGRESS NOTE ADULT - PROBLEM SELECTOR PROBLEM 2
COPD exacerbation
Type 2 diabetes mellitus with hyperglycemia, with long-term current use of insulin
15
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
## 2021-06-23 ENCOUNTER — EMERGENCY (EMERGENCY)
Facility: HOSPITAL | Age: 61
LOS: 1 days | Discharge: HOME | End: 2021-06-23
Attending: EMERGENCY MEDICINE
Payer: MEDICAID

## 2021-06-23 VITALS
TEMPERATURE: 98 F | DIASTOLIC BLOOD PRESSURE: 73 MMHG | SYSTOLIC BLOOD PRESSURE: 122 MMHG | OXYGEN SATURATION: 95 % | RESPIRATION RATE: 18 BRPM

## 2021-06-23 VITALS
HEIGHT: 68 IN | TEMPERATURE: 99 F | WEIGHT: 158.95 LBS | OXYGEN SATURATION: 97 % | DIASTOLIC BLOOD PRESSURE: 74 MMHG | RESPIRATION RATE: 18 BRPM | HEART RATE: 105 BPM | SYSTOLIC BLOOD PRESSURE: 109 MMHG

## 2021-06-23 LAB
ALBUMIN SERPL ELPH-MCNC: 3.3 G/DL — LOW (ref 3.5–5)
ALP SERPL-CCNC: 70 U/L — SIGNIFICANT CHANGE UP (ref 40–120)
ALT FLD-CCNC: 22 U/L DA — SIGNIFICANT CHANGE UP (ref 10–60)
ANION GAP SERPL CALC-SCNC: 2 MMOL/L — LOW (ref 5–17)
AST SERPL-CCNC: 11 U/L — SIGNIFICANT CHANGE UP (ref 10–40)
BASE EXCESS BLDV CALC-SCNC: 14.9 MMOL/L — SIGNIFICANT CHANGE UP
BASOPHILS # BLD AUTO: 0.02 K/UL — SIGNIFICANT CHANGE UP (ref 0–0.2)
BASOPHILS NFR BLD AUTO: 0.2 % — SIGNIFICANT CHANGE UP (ref 0–2)
BILIRUB SERPL-MCNC: 0.3 MG/DL — SIGNIFICANT CHANGE UP (ref 0.2–1.2)
BUN SERPL-MCNC: 12 MG/DL — SIGNIFICANT CHANGE UP (ref 7–18)
CALCIUM SERPL-MCNC: 8.7 MG/DL — SIGNIFICANT CHANGE UP (ref 8.4–10.5)
CHLORIDE SERPL-SCNC: 97 MMOL/L — SIGNIFICANT CHANGE UP (ref 96–108)
CO2 SERPL-SCNC: 40 MMOL/L — HIGH (ref 22–31)
CREAT SERPL-MCNC: 0.56 MG/DL — SIGNIFICANT CHANGE UP (ref 0.5–1.3)
EOSINOPHIL # BLD AUTO: 0.1 K/UL — SIGNIFICANT CHANGE UP (ref 0–0.5)
EOSINOPHIL NFR BLD AUTO: 1 % — SIGNIFICANT CHANGE UP (ref 0–6)
GLUCOSE SERPL-MCNC: 131 MG/DL — HIGH (ref 70–99)
HCO3 BLDV-SCNC: 46 MMOL/L — HIGH (ref 22–29)
HCT VFR BLD CALC: 39.2 % — SIGNIFICANT CHANGE UP (ref 39–50)
HGB BLD-MCNC: 11.8 G/DL — LOW (ref 13–17)
IMM GRANULOCYTES NFR BLD AUTO: 0.3 % — SIGNIFICANT CHANGE UP (ref 0–1.5)
LYMPHOCYTES # BLD AUTO: 1.16 K/UL — SIGNIFICANT CHANGE UP (ref 1–3.3)
LYMPHOCYTES # BLD AUTO: 11.7 % — LOW (ref 13–44)
MCHC RBC-ENTMCNC: 28.3 PG — SIGNIFICANT CHANGE UP (ref 27–34)
MCHC RBC-ENTMCNC: 30.1 GM/DL — LOW (ref 32–36)
MCV RBC AUTO: 94 FL — SIGNIFICANT CHANGE UP (ref 80–100)
MONOCYTES # BLD AUTO: 0.87 K/UL — SIGNIFICANT CHANGE UP (ref 0–0.9)
MONOCYTES NFR BLD AUTO: 8.8 % — SIGNIFICANT CHANGE UP (ref 2–14)
NEUTROPHILS # BLD AUTO: 7.74 K/UL — HIGH (ref 1.8–7.4)
NEUTROPHILS NFR BLD AUTO: 78 % — HIGH (ref 43–77)
NRBC # BLD: 0 /100 WBCS — SIGNIFICANT CHANGE UP (ref 0–0)
NT-PROBNP SERPL-SCNC: 133 PG/ML — HIGH (ref 0–125)
PCO2 BLDV: 96 MMHG — HIGH (ref 42–55)
PH BLDV: 7.29 — LOW (ref 7.32–7.43)
PLATELET # BLD AUTO: 177 K/UL — SIGNIFICANT CHANGE UP (ref 150–400)
PO2 BLDV: 46 MMHG — SIGNIFICANT CHANGE UP
POTASSIUM SERPL-MCNC: 4.7 MMOL/L — SIGNIFICANT CHANGE UP (ref 3.5–5.3)
POTASSIUM SERPL-SCNC: 4.7 MMOL/L — SIGNIFICANT CHANGE UP (ref 3.5–5.3)
PROT SERPL-MCNC: 6.8 G/DL — SIGNIFICANT CHANGE UP (ref 6–8.3)
RBC # BLD: 4.17 M/UL — LOW (ref 4.2–5.8)
RBC # FLD: 12.6 % — SIGNIFICANT CHANGE UP (ref 10.3–14.5)
SAO2 % BLDV: 77.7 % — SIGNIFICANT CHANGE UP
SODIUM SERPL-SCNC: 139 MMOL/L — SIGNIFICANT CHANGE UP (ref 135–145)
TROPONIN I SERPL-MCNC: <0.015 NG/ML — SIGNIFICANT CHANGE UP (ref 0–0.04)
WBC # BLD: 9.92 K/UL — SIGNIFICANT CHANGE UP (ref 3.8–10.5)
WBC # FLD AUTO: 9.92 K/UL — SIGNIFICANT CHANGE UP (ref 3.8–10.5)

## 2021-06-23 PROCEDURE — 71045 X-RAY EXAM CHEST 1 VIEW: CPT

## 2021-06-23 PROCEDURE — 96374 THER/PROPH/DIAG INJ IV PUSH: CPT

## 2021-06-23 PROCEDURE — 93005 ELECTROCARDIOGRAM TRACING: CPT

## 2021-06-23 PROCEDURE — 99284 EMERGENCY DEPT VISIT MOD MDM: CPT | Mod: 25

## 2021-06-23 PROCEDURE — 80053 COMPREHEN METABOLIC PANEL: CPT

## 2021-06-23 PROCEDURE — 99285 EMERGENCY DEPT VISIT HI MDM: CPT

## 2021-06-23 PROCEDURE — 85025 COMPLETE CBC W/AUTO DIFF WBC: CPT

## 2021-06-23 PROCEDURE — 82962 GLUCOSE BLOOD TEST: CPT

## 2021-06-23 PROCEDURE — 71045 X-RAY EXAM CHEST 1 VIEW: CPT | Mod: 26

## 2021-06-23 PROCEDURE — 36415 COLL VENOUS BLD VENIPUNCTURE: CPT

## 2021-06-23 PROCEDURE — 94640 AIRWAY INHALATION TREATMENT: CPT

## 2021-06-23 PROCEDURE — 83880 ASSAY OF NATRIURETIC PEPTIDE: CPT

## 2021-06-23 PROCEDURE — 82803 BLOOD GASES ANY COMBINATION: CPT

## 2021-06-23 PROCEDURE — 84484 ASSAY OF TROPONIN QUANT: CPT

## 2021-06-23 RX ORDER — IPRATROPIUM/ALBUTEROL SULFATE 18-103MCG
3 AEROSOL WITH ADAPTER (GRAM) INHALATION ONCE
Refills: 0 | Status: COMPLETED | OUTPATIENT
Start: 2021-06-23 | End: 2021-06-23

## 2021-06-23 RX ADMIN — Medication 3 MILLILITER(S): at 12:15

## 2021-06-23 RX ADMIN — Medication 125 MILLIGRAM(S): at 11:26

## 2021-06-23 RX ADMIN — Medication 3 MILLILITER(S): at 10:40

## 2021-06-23 NOTE — ED PROVIDER NOTE - OBJECTIVE STATEMENT
60 Yo male from assisted living with a PMHx of HTN, HLD, CAD, DM, COPD ( on home oxygen 2.5 L) , bipolar disorder  and a PSHx of a stented CAD p/w SOB. Starting yesterday pt had SOB while on his home O2. Also reported ambulatory difficulty with his walker 2/2 to weakness and SOB. Reports dyspnea worse when laying down and ambulating but mild when sitting up. Also reports wheezing. SOB did not improve this AM so decided to come to ED.   Denies fevers, chills, n/v, cough, chest pain. 60M from assisted living with a PMHx of HTN, HLD, CAD, CHF p EF DM, COPD ( on home oxygen 2.5 L) , bipolar disorder  and a PSHx of a stented CAD p/w SOB. Starting yesterday pt had SOB while on his home O2. Also reported ambulatory difficulty with his walker 2/2 to weakness and SOB. Reports dyspnea worse when laying down and ambulating but mild when sitting up. Also reports wheezing. SOB did not improve this AM so decided to come to ED.   Denies fevers, chills, n/v, cough, chest pain.  ECHO 2018 EF 75% but severe RV systolic dysfunction.

## 2021-06-23 NOTE — ED PROVIDER NOTE - PROGRESS NOTE DETAILS
Pt feeling better reports breathing back at baseline. Await official xray read to decide to dc home w or w/o abx. Pt feeling better reports breathing back at baseline. CXR w/ pulm congestion cannot exclude pneumonia. As pt w/o cough/fever/white count will hold off on abx. DC back to facility with steroids.

## 2021-06-23 NOTE — ED ADULT NURSE NOTE - OBJECTIVE STATEMENT
Patient presents to ED with c/o shortness of breath and weakness. Patient is A&OX4, from [facility] assisted living on O2 at home. Patient reports back pain since he fell out of bed 2wks ago, took percocet 1hr ago. Patient uses walker at baseline, reports feeling weakness in last 2 days.

## 2021-06-23 NOTE — ED ADULT NURSE NOTE - ED STAT RN HANDOFF DETAILS
Patient discharged to [facility], reports improvement in breathing. Discharged home waiting transportation. IV removed no bleeding noted.

## 2021-06-23 NOTE — ED PROVIDER NOTE - NSFOLLOWUPINSTRUCTIONS_ED_ALL_ED_FT
You came in for shortness of breath. This was likely from exacerbation of COPD. Your breathing improved with IV steroid and breathing treatments. Continue 4 more days of oral steroids. Follow up with pulmonologist. Return to ED if symptoms worsen or do not continue to improve.       Chronic Obstructive Pulmonary Disease Exacerbation    Chronic obstructive pulmonary disease (COPD) is a long-term (chronic) lung problem. In COPD, the flow of air from the lungs is limited. COPD exacerbations are times that breathing gets worse and you need more than your normal treatment. Without treatment, they can be life threatening. If they happen often, your lungs can become more damaged. If your COPD gets worse, your doctor may treat you with:  •Medicines.      •Oxygen.      •Different ways to clear your airway, such as using a mask.        Follow these instructions at home:    Medicines     •Take over-the-counter and prescription medicines only as told by your doctor.      •If you take an antibiotic or steroid medicine, do not stop taking the medicine even if you start to feel better.      •Keep up with shots (vaccinations) as told by your doctor. Be sure to get a yearly (annual) flu shot.      Lifestyle     • Do not smoke. If you need help quitting, ask your doctor.      •Eat healthy foods.      •Exercise regularly.      •Get plenty of sleep.      •Avoid tobacco smoke and other things that can bother your lungs.      •Wash your hands often with soap and water. This will help keep you from getting an infection. If you cannot use soap and water, use hand .      •During flu season, avoid areas that are crowded with people.      General instructions     •Drink enough fluid to keep your pee (urine) clear or pale yellow. Do not do this if your doctor has told you not to.      •Use a cool mist machine (vaporizer).      •If you use oxygen or a machine that turns medicine into a mist (nebulizer), continue to use it as told.      •Follow all instructions for rehabilitation. These are steps you can take to make your body work better.      •Keep all follow-up visits as told by your doctor. This is important.        Contact a doctor if:    •Your COPD symptoms get worse than normal.        Get help right away if:    •You are short of breath and it gets worse.      •You have trouble talking.      •You have chest pain.      •You cough up blood.      •You have a fever.      •You keep throwing up (vomiting).      •You feel weak or you pass out (faint).      •You feel confused.      •You are not able to sleep because of your symptoms.      •You are not able to do daily activities.        Summary    •COPD exacerbations are times that breathing gets worse and you need more treatment than normal.      •COPD exacerbations can be very serious and may cause your lungs to become more damaged.      •Do not smoke. If you need help quitting, ask your doctor.      •Stay up-to-date on your shots. Get a flu shot every year.      This information is not intended to replace advice given to you by your health care provider. Make sure you discuss any questions you have with your health care provider.

## 2021-06-23 NOTE — ED PROVIDER NOTE - PHYSICAL EXAMINATION
VITALS:   T(C): 37.1 (06-23-21 @ 09:31), Max: 37.1 (06-23-21 @ 09:31)  HR: 105 (06-23-21 @ 09:31) (105 - 105)  BP: 109/74 (06-23-21 @ 09:31) (109/74 - 109/74)  RR: 18 (06-23-21 @ 09:31) (18 - 18)  SpO2: 97% (06-23-21 @ 09:31) (97% - 97%)    GENERAL: NAD, lying in bed comfortably  HEAD:  Atraumatic, Normocephalic  EYES: EOMI, PERRLA, conjunctiva and sclera clear  ENT: Moist mucous membranes  NECK: Supple, No JVD  CHEST/LUNG: B/l end expiratory wheezes, mild distress with lung exam  No rales, rhonchi, or rubs  HEART: Regular rate and rhythm; No murmurs, rubs, or gallops  ABDOMEN: BSx4; Soft, nontender, nondistended  EXTREMITIES:  1+ pedal edema b/l 2+ Peripheral Pulses, . No clubbing, cyanosis  NERVOUS SYSTEM:  A&Ox3, no focal deficits   SKIN: R arm bruise and dry flaky skin b/l legs VITALS:   T(C): 37.1 (06-23-21 @ 09:31), Max: 37.1 (06-23-21 @ 09:31)  HR: 105 (06-23-21 @ 09:31) (105 - 105)  BP: 109/74 (06-23-21 @ 09:31) (109/74 - 109/74)  RR: 18 (06-23-21 @ 09:31) (18 - 18)  SpO2: 97% (06-23-21 @ 09:31) (97% - 97%)    GENERAL: NAD, lying in bed comfortably  HEAD:  Atraumatic, Normocephalic  EYES: EOMI, PERRLA, conjunctiva and sclera clear  ENT: Moist mucous membranes  NECK: Supple, No JVD  CHEST/LUNG: B/l end expiratory wheezes, mild distress with lung exam  No rales, rhonchi, or rubs  HEART: Tachy Regular rhythm; No murmurs, rubs, or gallops  ABDOMEN: BSx4; Soft, nontender, nondistended  EXTREMITIES:  1+ pedal edema b/l 2+ Peripheral Pulses, . No clubbing, cyanosis  NERVOUS SYSTEM:  A&Ox3, no focal deficits   SKIN: R arm bruise and dry flaky skin b/l legs

## 2021-06-23 NOTE — ED PROVIDER NOTE - ATTENDING CONTRIBUTION TO CARE
I conducted a face-to-face interaction with patient and I agree with resident doctor documentation and plan.  Pt presents with shortness of breath   Exam:  General:  AAO x 3, NAD  Lungs:  Wheezing b/l, no respiratory distress  A/P:  shortness of breath--will check EKG, labs, CXR, give nebs and steroids and reassess

## 2021-06-23 NOTE — ED PROVIDER NOTE - CLINICAL SUMMARY MEDICAL DECISION MAKING FREE TEXT BOX
10 Yo male from assisted living with a PMHx of HTN, HLD, CAD w/ stent, DM, COPD ( on home oxygen 2.5 L) , bipolar disorder p/w SOB w/ associated weakness, wheezing, orthopnea. Possible pneumonia vs COPD exacerbation. Also need to r/o CHF exacerbation or MI. CXR, EKG, labs including troponin. Duoneb and reassess. 60M from assisted living with a PMHx of HTN, HLD, CAD w/ stent, CHFpEF, DM, COPD ( on home oxygen 2.5 L) , bipolar disorder p/w SOB w/ associated weakness, wheezing, orthopnea. Possible pneumonia vs COPD exacerbation. Also need to r/o CHF exacerbation or MI. CXR, EKG, labs including troponin. Duoneb and reassess. 60M from assisted living with a PMHx of HTN, HLD, CAD w/ stent, CHFpEF, DM, COPD ( on home oxygen 2.5 L) , bipolar disorder p/w SOB w/ associated weakness, wheezing, orthopnea. Possible pneumonia vs COPD exacerbation. Also need to r/o CHF exacerbation or MI. CXR, EKG, labs including troponin. Duoneb, steroids and reassess.

## 2021-06-23 NOTE — ED PROVIDER NOTE - CHPI ED SYMPTOMS NEG
no dizziness/no fever/no nausea/no numbness/no pain/no tingling/no vomiting/no chills/no decreased eating/drinking

## 2021-06-23 NOTE — ED PROVIDER NOTE - NS ED ROS FT
REVIEW OF SYSTEMS:    CONSTITUTIONAL: +weakness No  fevers or chills  EYES/ENT: No visual changes;  No vertigo or throat pain   NECK: No pain or stiffness  RESPIRATORY: +SOB and wheezing No cough,hemoptysis;  CARDIOVASCULAR: No chest pain or palpitations  GASTROINTESTINAL: No abdominal or epigastric pain. No nausea, vomiting, or hematemesis; No diarrhea or constipation. No melena or hematochezia.  GENITOURINARY: No dysuria, frequency or hematuria  NEUROLOGICAL: No numbness or weakness  SKIN: No itching, burning, rashes, or lesions   All other review of systems is negative unless indicated above.

## 2021-06-23 NOTE — ED PROVIDER NOTE - PATIENT PORTAL LINK FT
You can access the FollowMyHealth Patient Portal offered by [facility] by registering at the following website: [email or web address]. By joining [facility]’s FollowMyHealth portal, you will also be able to view your health information using other applications (apps) compatible with our system.

## 2021-07-04 ENCOUNTER — INPATIENT (INPATIENT)
Facility: HOSPITAL | Age: 61
LOS: 10 days | Discharge: NURSING, REHAB OR LONG-TERM CARE | DRG: 189 | End: 2021-07-15
Attending: INTERNAL MEDICINE | Admitting: INTERNAL MEDICINE
Payer: MEDICAID

## 2021-07-04 VITALS
SYSTOLIC BLOOD PRESSURE: 133 MMHG | HEIGHT: 68 IN | TEMPERATURE: 98 F | DIASTOLIC BLOOD PRESSURE: 70 MMHG | HEART RATE: 117 BPM | WEIGHT: 218.26 LBS | RESPIRATION RATE: 22 BRPM | OXYGEN SATURATION: 76 %

## 2021-07-04 DIAGNOSIS — Z29.9 ENCOUNTER FOR PROPHYLACTIC MEASURES, UNSPECIFIED: ICD-10-CM

## 2021-07-04 DIAGNOSIS — K21.9 GASTRO-ESOPHAGEAL REFLUX DISEASE WITHOUT ESOPHAGITIS: ICD-10-CM

## 2021-07-04 DIAGNOSIS — E78.5 HYPERLIPIDEMIA, UNSPECIFIED: ICD-10-CM

## 2021-07-04 DIAGNOSIS — F31.9 BIPOLAR DISORDER, UNSPECIFIED: ICD-10-CM

## 2021-07-04 DIAGNOSIS — J44.9 CHRONIC OBSTRUCTIVE PULMONARY DISEASE, UNSPECIFIED: ICD-10-CM

## 2021-07-04 DIAGNOSIS — Z95.5 PRESENCE OF CORONARY ANGIOPLASTY IMPLANT AND GRAFT: ICD-10-CM

## 2021-07-04 DIAGNOSIS — J45.909 UNSPECIFIED ASTHMA, UNCOMPLICATED: ICD-10-CM

## 2021-07-04 DIAGNOSIS — Z99.81 DEPENDENCE ON SUPPLEMENTAL OXYGEN: ICD-10-CM

## 2021-07-04 DIAGNOSIS — E11.9 TYPE 2 DIABETES MELLITUS WITHOUT COMPLICATIONS: ICD-10-CM

## 2021-07-04 DIAGNOSIS — I27.20 PULMONARY HYPERTENSION, UNSPECIFIED: ICD-10-CM

## 2021-07-04 LAB
24R-OH-CALCIDIOL SERPL-MCNC: 48.5 NG/ML — SIGNIFICANT CHANGE UP (ref 30–80)
A1C WITH ESTIMATED AVERAGE GLUCOSE RESULT: 6.5 % — HIGH (ref 4–5.6)
ALBUMIN SERPL ELPH-MCNC: 3.6 G/DL — SIGNIFICANT CHANGE UP (ref 3.5–5)
ALP SERPL-CCNC: 66 U/L — SIGNIFICANT CHANGE UP (ref 40–120)
ALT FLD-CCNC: 32 U/L DA — SIGNIFICANT CHANGE UP (ref 10–60)
ANION GAP SERPL CALC-SCNC: 3 MMOL/L — LOW (ref 5–17)
APPEARANCE UR: CLEAR — SIGNIFICANT CHANGE UP
AST SERPL-CCNC: 13 U/L — SIGNIFICANT CHANGE UP (ref 10–40)
BACTERIA # UR AUTO: ABNORMAL /HPF
BASOPHILS # BLD AUTO: 0.04 K/UL — SIGNIFICANT CHANGE UP (ref 0–0.2)
BASOPHILS NFR BLD AUTO: 0.3 % — SIGNIFICANT CHANGE UP (ref 0–2)
BILIRUB SERPL-MCNC: 0.3 MG/DL — SIGNIFICANT CHANGE UP (ref 0.2–1.2)
BILIRUB UR-MCNC: NEGATIVE — SIGNIFICANT CHANGE UP
BUN SERPL-MCNC: 13 MG/DL — SIGNIFICANT CHANGE UP (ref 7–18)
CALCIUM SERPL-MCNC: 8.9 MG/DL — SIGNIFICANT CHANGE UP (ref 8.4–10.5)
CHLORIDE SERPL-SCNC: 94 MMOL/L — LOW (ref 96–108)
CHOLEST SERPL-MCNC: 154 MG/DL — SIGNIFICANT CHANGE UP
CO2 SERPL-SCNC: 42 MMOL/L — HIGH (ref 22–31)
COLOR SPEC: YELLOW — SIGNIFICANT CHANGE UP
CREAT SERPL-MCNC: 0.58 MG/DL — SIGNIFICANT CHANGE UP (ref 0.5–1.3)
CRP SERPL-MCNC: 15 MG/L — HIGH
DIFF PNL FLD: NEGATIVE — SIGNIFICANT CHANGE UP
EOSINOPHIL # BLD AUTO: 0.04 K/UL — SIGNIFICANT CHANGE UP (ref 0–0.5)
EOSINOPHIL NFR BLD AUTO: 0.3 % — SIGNIFICANT CHANGE UP (ref 0–6)
EPI CELLS # UR: ABNORMAL /HPF
ERYTHROCYTE [SEDIMENTATION RATE] IN BLOOD: 10 MM/HR — SIGNIFICANT CHANGE UP (ref 0–20)
ESTIMATED AVERAGE GLUCOSE: 140 MG/DL — HIGH (ref 68–114)
GLUCOSE BLDC GLUCOMTR-MCNC: 150 MG/DL — HIGH (ref 70–99)
GLUCOSE BLDC GLUCOMTR-MCNC: 259 MG/DL — HIGH (ref 70–99)
GLUCOSE SERPL-MCNC: 75 MG/DL — SIGNIFICANT CHANGE UP (ref 70–99)
GLUCOSE UR QL: NEGATIVE — SIGNIFICANT CHANGE UP
HCT VFR BLD CALC: 44.8 % — SIGNIFICANT CHANGE UP (ref 39–50)
HDLC SERPL-MCNC: 25 MG/DL — LOW
HGB BLD-MCNC: 13.2 G/DL — SIGNIFICANT CHANGE UP (ref 13–17)
IMM GRANULOCYTES NFR BLD AUTO: 0.8 % — SIGNIFICANT CHANGE UP (ref 0–1.5)
KETONES UR-MCNC: NEGATIVE — SIGNIFICANT CHANGE UP
LEUKOCYTE ESTERASE UR-ACNC: ABNORMAL
LIPID PNL WITH DIRECT LDL SERPL: 102 MG/DL — HIGH
LYMPHOCYTES # BLD AUTO: 1.04 K/UL — SIGNIFICANT CHANGE UP (ref 1–3.3)
LYMPHOCYTES # BLD AUTO: 7.3 % — LOW (ref 13–44)
MCHC RBC-ENTMCNC: 28.4 PG — SIGNIFICANT CHANGE UP (ref 27–34)
MCHC RBC-ENTMCNC: 29.5 GM/DL — LOW (ref 32–36)
MCV RBC AUTO: 96.3 FL — SIGNIFICANT CHANGE UP (ref 80–100)
MONOCYTES # BLD AUTO: 0.71 K/UL — SIGNIFICANT CHANGE UP (ref 0–0.9)
MONOCYTES NFR BLD AUTO: 5 % — SIGNIFICANT CHANGE UP (ref 2–14)
NEUTROPHILS # BLD AUTO: 12.23 K/UL — HIGH (ref 1.8–7.4)
NEUTROPHILS NFR BLD AUTO: 86.3 % — HIGH (ref 43–77)
NITRITE UR-MCNC: NEGATIVE — SIGNIFICANT CHANGE UP
NON HDL CHOLESTEROL: 129 MG/DL — SIGNIFICANT CHANGE UP
NRBC # BLD: 0 /100 WBCS — SIGNIFICANT CHANGE UP (ref 0–0)
NT-PROBNP SERPL-SCNC: 748 PG/ML — HIGH (ref 0–125)
PH UR: 6.5 — SIGNIFICANT CHANGE UP (ref 5–8)
PLATELET # BLD AUTO: 253 K/UL — SIGNIFICANT CHANGE UP (ref 150–400)
POTASSIUM SERPL-MCNC: 4.8 MMOL/L — SIGNIFICANT CHANGE UP (ref 3.5–5.3)
POTASSIUM SERPL-SCNC: 4.8 MMOL/L — SIGNIFICANT CHANGE UP (ref 3.5–5.3)
PROT SERPL-MCNC: 7.2 G/DL — SIGNIFICANT CHANGE UP (ref 6–8.3)
PROT UR-MCNC: 30 MG/DL
RBC # BLD: 4.65 M/UL — SIGNIFICANT CHANGE UP (ref 4.2–5.8)
RBC # FLD: 13.2 % — SIGNIFICANT CHANGE UP (ref 10.3–14.5)
RBC CASTS # UR COMP ASSIST: SIGNIFICANT CHANGE UP /HPF (ref 0–2)
SARS-COV-2 RNA SPEC QL NAA+PROBE: SIGNIFICANT CHANGE UP
SODIUM SERPL-SCNC: 139 MMOL/L — SIGNIFICANT CHANGE UP (ref 135–145)
SP GR SPEC: 1.01 — SIGNIFICANT CHANGE UP (ref 1.01–1.02)
TRIGL SERPL-MCNC: 136 MG/DL — SIGNIFICANT CHANGE UP
TROPONIN I SERPL-MCNC: 0.02 NG/ML — SIGNIFICANT CHANGE UP (ref 0–0.04)
TSH SERPL-MCNC: 0.88 UU/ML — SIGNIFICANT CHANGE UP (ref 0.34–4.82)
UROBILINOGEN FLD QL: NEGATIVE — SIGNIFICANT CHANGE UP
VIT B12 SERPL-MCNC: 935 PG/ML — SIGNIFICANT CHANGE UP (ref 232–1245)
WBC # BLD: 14.18 K/UL — HIGH (ref 3.8–10.5)
WBC # FLD AUTO: 14.18 K/UL — HIGH (ref 3.8–10.5)
WBC UR QL: SIGNIFICANT CHANGE UP /HPF (ref 0–5)

## 2021-07-04 PROCEDURE — 71045 X-RAY EXAM CHEST 1 VIEW: CPT | Mod: 26

## 2021-07-04 PROCEDURE — 99285 EMERGENCY DEPT VISIT HI MDM: CPT

## 2021-07-04 RX ORDER — TRAZODONE HCL 50 MG
150 TABLET ORAL
Refills: 0 | Status: DISCONTINUED | OUTPATIENT
Start: 2021-07-04 | End: 2021-07-04

## 2021-07-04 RX ORDER — FUROSEMIDE 40 MG
20 TABLET ORAL DAILY
Refills: 0 | Status: DISCONTINUED | OUTPATIENT
Start: 2021-07-04 | End: 2021-07-04

## 2021-07-04 RX ORDER — MULTIVIT-MIN/FERROUS GLUCONATE 9 MG/15 ML
1 LIQUID (ML) ORAL DAILY
Refills: 0 | Status: DISCONTINUED | OUTPATIENT
Start: 2021-07-04 | End: 2021-07-15

## 2021-07-04 RX ORDER — FAMOTIDINE 10 MG/ML
0 INJECTION INTRAVENOUS
Qty: 0 | Refills: 0 | DISCHARGE

## 2021-07-04 RX ORDER — ALPRAZOLAM 0.25 MG
0.5 TABLET ORAL AT BEDTIME
Refills: 0 | Status: DISCONTINUED | OUTPATIENT
Start: 2021-07-04 | End: 2021-07-07

## 2021-07-04 RX ORDER — RISPERIDONE 4 MG/1
1 TABLET ORAL DAILY
Refills: 0 | Status: DISCONTINUED | OUTPATIENT
Start: 2021-07-05 | End: 2021-07-15

## 2021-07-04 RX ORDER — RISPERIDONE 4 MG/1
1 TABLET ORAL
Refills: 0 | Status: DISCONTINUED | OUTPATIENT
Start: 2021-07-04 | End: 2021-07-04

## 2021-07-04 RX ORDER — NICOTINE POLACRILEX 2 MG
1 GUM BUCCAL DAILY
Refills: 0 | Status: DISCONTINUED | OUTPATIENT
Start: 2021-07-04 | End: 2021-07-15

## 2021-07-04 RX ORDER — OXYCODONE AND ACETAMINOPHEN 5; 325 MG/1; MG/1
2 TABLET ORAL EVERY 6 HOURS
Refills: 0 | Status: DISCONTINUED | OUTPATIENT
Start: 2021-07-04 | End: 2021-07-11

## 2021-07-04 RX ORDER — TRAZODONE HCL 50 MG
150 TABLET ORAL DAILY
Refills: 0 | Status: DISCONTINUED | OUTPATIENT
Start: 2021-07-04 | End: 2021-07-04

## 2021-07-04 RX ORDER — INSULIN LISPRO 100/ML
30 VIAL (ML) SUBCUTANEOUS
Qty: 0 | Refills: 0 | DISCHARGE

## 2021-07-04 RX ORDER — FUROSEMIDE 40 MG
20 TABLET ORAL DAILY
Refills: 0 | Status: DISCONTINUED | OUTPATIENT
Start: 2021-07-05 | End: 2021-07-05

## 2021-07-04 RX ORDER — RISPERIDONE 4 MG/1
1 TABLET ORAL DAILY
Refills: 0 | Status: DISCONTINUED | OUTPATIENT
Start: 2021-07-04 | End: 2021-07-04

## 2021-07-04 RX ORDER — ASPIRIN/CALCIUM CARB/MAGNESIUM 324 MG
81 TABLET ORAL DAILY
Refills: 0 | Status: DISCONTINUED | OUTPATIENT
Start: 2021-07-04 | End: 2021-07-15

## 2021-07-04 RX ORDER — DEXTROSE 50 % IN WATER 50 %
25 SYRINGE (ML) INTRAVENOUS ONCE
Refills: 0 | Status: DISCONTINUED | OUTPATIENT
Start: 2021-07-04 | End: 2021-07-05

## 2021-07-04 RX ORDER — BUDESONIDE AND FORMOTEROL FUMARATE DIHYDRATE 160; 4.5 UG/1; UG/1
2 AEROSOL RESPIRATORY (INHALATION)
Refills: 0 | Status: DISCONTINUED | OUTPATIENT
Start: 2021-07-04 | End: 2021-07-06

## 2021-07-04 RX ORDER — SODIUM CHLORIDE 9 MG/ML
1000 INJECTION, SOLUTION INTRAVENOUS
Refills: 0 | Status: DISCONTINUED | OUTPATIENT
Start: 2021-07-04 | End: 2021-07-05

## 2021-07-04 RX ORDER — FUROSEMIDE 40 MG
40 TABLET ORAL ONCE
Refills: 0 | Status: COMPLETED | OUTPATIENT
Start: 2021-07-04 | End: 2021-07-04

## 2021-07-04 RX ORDER — INSULIN LISPRO 100/ML
VIAL (ML) SUBCUTANEOUS
Refills: 0 | Status: DISCONTINUED | OUTPATIENT
Start: 2021-07-04 | End: 2021-07-05

## 2021-07-04 RX ORDER — ALBUTEROL 90 UG/1
2 AEROSOL, METERED ORAL EVERY 6 HOURS
Refills: 0 | Status: DISCONTINUED | OUTPATIENT
Start: 2021-07-04 | End: 2021-07-06

## 2021-07-04 RX ORDER — INSULIN LISPRO 100/ML
VIAL (ML) SUBCUTANEOUS AT BEDTIME
Refills: 0 | Status: DISCONTINUED | OUTPATIENT
Start: 2021-07-04 | End: 2021-07-15

## 2021-07-04 RX ORDER — IPRATROPIUM/ALBUTEROL SULFATE 18-103MCG
3 AEROSOL WITH ADAPTER (GRAM) INHALATION ONCE
Refills: 0 | Status: COMPLETED | OUTPATIENT
Start: 2021-07-04 | End: 2021-07-04

## 2021-07-04 RX ORDER — DEXTROSE 50 % IN WATER 50 %
15 SYRINGE (ML) INTRAVENOUS ONCE
Refills: 0 | Status: DISCONTINUED | OUTPATIENT
Start: 2021-07-04 | End: 2021-07-05

## 2021-07-04 RX ORDER — SIMETHICONE 80 MG/1
80 TABLET, CHEWABLE ORAL EVERY 6 HOURS
Refills: 0 | Status: DISCONTINUED | OUTPATIENT
Start: 2021-07-04 | End: 2021-07-07

## 2021-07-04 RX ORDER — TRAZODONE HCL 50 MG
1 TABLET ORAL
Qty: 0 | Refills: 0 | DISCHARGE

## 2021-07-04 RX ORDER — RISPERIDONE 4 MG/1
1 TABLET ORAL
Qty: 0 | Refills: 0 | DISCHARGE

## 2021-07-04 RX ORDER — PANTOPRAZOLE SODIUM 20 MG/1
40 TABLET, DELAYED RELEASE ORAL
Refills: 0 | Status: DISCONTINUED | OUTPATIENT
Start: 2021-07-04 | End: 2021-07-07

## 2021-07-04 RX ORDER — GLUCAGON INJECTION, SOLUTION 0.5 MG/.1ML
1 INJECTION, SOLUTION SUBCUTANEOUS ONCE
Refills: 0 | Status: DISCONTINUED | OUTPATIENT
Start: 2021-07-04 | End: 2021-07-05

## 2021-07-04 RX ORDER — TRAZODONE HCL 50 MG
150 TABLET ORAL AT BEDTIME
Refills: 0 | Status: DISCONTINUED | OUTPATIENT
Start: 2021-07-04 | End: 2021-07-15

## 2021-07-04 RX ORDER — RISPERIDONE 4 MG/1
0 TABLET ORAL
Qty: 0 | Refills: 0 | DISCHARGE

## 2021-07-04 RX ORDER — LANOLIN ALCOHOL/MO/W.PET/CERES
3 CREAM (GRAM) TOPICAL AT BEDTIME
Refills: 0 | Status: DISCONTINUED | OUTPATIENT
Start: 2021-07-04 | End: 2021-07-15

## 2021-07-04 RX ORDER — ENOXAPARIN SODIUM 100 MG/ML
40 INJECTION SUBCUTANEOUS DAILY
Refills: 0 | Status: DISCONTINUED | OUTPATIENT
Start: 2021-07-04 | End: 2021-07-15

## 2021-07-04 RX ORDER — POLYETHYLENE GLYCOL 3350 17 G/17G
17 POWDER, FOR SOLUTION ORAL DAILY
Refills: 0 | Status: DISCONTINUED | OUTPATIENT
Start: 2021-07-04 | End: 2021-07-15

## 2021-07-04 RX ORDER — DEXTROSE 50 % IN WATER 50 %
12.5 SYRINGE (ML) INTRAVENOUS ONCE
Refills: 0 | Status: DISCONTINUED | OUTPATIENT
Start: 2021-07-04 | End: 2021-07-05

## 2021-07-04 RX ORDER — ALPRAZOLAM 0.25 MG
0.5 TABLET ORAL DAILY
Refills: 0 | Status: DISCONTINUED | OUTPATIENT
Start: 2021-07-04 | End: 2021-07-04

## 2021-07-04 RX ADMIN — Medication 1: at 22:04

## 2021-07-04 RX ADMIN — Medication 40 MILLIGRAM(S): at 11:58

## 2021-07-04 RX ADMIN — Medication 150 MILLIGRAM(S): at 21:35

## 2021-07-04 RX ADMIN — Medication 1 PATCH: at 18:47

## 2021-07-04 RX ADMIN — Medication 0.5 MILLIGRAM(S): at 21:35

## 2021-07-04 RX ADMIN — Medication 125 MILLIGRAM(S): at 11:58

## 2021-07-04 RX ADMIN — ALBUTEROL 2 PUFF(S): 90 AEROSOL, METERED ORAL at 21:37

## 2021-07-04 RX ADMIN — Medication 3 MILLILITER(S): at 16:05

## 2021-07-04 RX ADMIN — BUDESONIDE AND FORMOTEROL FUMARATE DIHYDRATE 2 PUFF(S): 160; 4.5 AEROSOL RESPIRATORY (INHALATION) at 21:37

## 2021-07-04 RX ADMIN — SIMETHICONE 80 MILLIGRAM(S): 80 TABLET, CHEWABLE ORAL at 18:47

## 2021-07-04 RX ADMIN — RISPERIDONE 1 MILLIGRAM(S): 4 TABLET ORAL at 19:05

## 2021-07-04 RX ADMIN — Medication 40 MILLIGRAM(S): at 16:06

## 2021-07-04 RX ADMIN — Medication 40 MILLIGRAM(S): at 21:38

## 2021-07-04 RX ADMIN — Medication 3 MILLIGRAM(S): at 21:41

## 2021-07-04 RX ADMIN — OXYCODONE AND ACETAMINOPHEN 2 TABLET(S): 5; 325 TABLET ORAL at 19:02

## 2021-07-04 RX ADMIN — Medication 3 MILLILITER(S): at 11:58

## 2021-07-04 NOTE — H&P ADULT - PROBLEM SELECTOR PLAN 5
will start on sliding scale for now as pt do not remember his units   add coverage as needed   f/u [id number]
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
## 2021-07-04 NOTE — ED PROVIDER NOTE - OBJECTIVE STATEMENT
61 y/o male h/o CAD with stent, CHF, COPD on 2L home O2, DM, GERD, HLD, pulmonary HTN, comes in for increasing leg swelling and shortness of breath a little worse than baseline for the last 3 days. Also c/o difficulty urinating. No fever. No N/V/D.

## 2021-07-04 NOTE — H&P ADULT - NSHPPHYSICALEXAM_GEN_ALL_CORE
T(C): 36.7 (07-04-21 @ 11:11), Max: 36.7 (07-04-21 @ 11:11)  HR: 115 (07-04-21 @ 12:30) (115 - 117)  BP: 133/70 (07-04-21 @ 11:11) (133/70 - 133/70)  RR: 23 (07-04-21 @ 12:30) (22 - 23)  SpO2: 95% (07-04-21 @ 12:30) (76% - 95%)    GENERAL: patient is obese, dyspneic   EYES: sclera clear, no exudates  ENMT: oropharynx clear without erythema, no exudates, moist mucous membranes  NECK: supple, soft  LUNGS: heavy wheezing on auscultation, no fremitus noted  HEART: soft S1/S2, regular rate and rhythm, no murmurs noted, 1+ extremity edema  GASTROINTESTINAL: abdomen is soft, nontender, nondistended, normoactive bowel sounds, no palpable masses  INTEGUMENT: good skin turgor, no lesions noted  MUSCULOSKELETAL: no clubbing or cyanosis, no obvious deformity  NEUROLOGIC: awake, alert, oriented x3, good muscle tone in 4 extremities, no obvious sensory deficits  PSYCHIATRIC: mood is anxious, affect is congruent, linear and logical thought process  HEME/LYMPH: no palpable supraclavicular nodules, no obvious ecchymosis or petechiae Ears: no ear pain and no hearing problems.Nose: no nasal congestion and no nasal drainage.Mouth/Throat: no dysphagia, no hoarseness and no throat pain.Neck: no lumps, no pain, no stiffness and no swollen glands.

## 2021-07-04 NOTE — H&P ADULT - ASSESSMENT
60 YOM, smoker, with history of COPD, CHF, CAD with stents, DM, obesity, HLD, psychiatric hx, admitted for COPD exacerbation.   - 60 YOM, smoker, with history of COPD, CHF, CAD with stents, DM, obesity, HLD, psychiatric hx, admitted for COPD exacerbation.   -

## 2021-07-04 NOTE — H&P ADULT - PROBLEM SELECTOR PLAN 10
RISK                                                          Points  [] Previous VTE                                           3  [] Thrombophilia                                        2  [] Lower limb paralysis                              2   [] Current Cancer                                       2   [x] Immobilization > 24 hrs                        1  [] ICU/CCU stay > 24 hours                       1  [] Age > 60                                                   1    Score: 1, initiate 40mg Lovenox SubQ

## 2021-07-04 NOTE — H&P ADULT - ATTENDING COMMENTS
PATIENT WAS SEEN AND EXAMINED     -  HYPOXIC AND HYPERCAPNOEIC RESPIRATORY FAILURE AND COPD EXACERBATION -  IV. STEROIDS, DUONEB , SINGULAIR, ADVAIR   -  OBESITY   -  SMOKER - COUNSELLED TO QUIT SMOKING   -  GI AND DVT PROPHYLAXIS  -  DR. REFUGIO CABELLO

## 2021-07-04 NOTE — H&P ADULT - PROBLEM SELECTOR PLAN 1
- p/w with worsening SOB from last few days, no cough  - multifactorial in setting of COPD and CHF   - Likely 2/2 to cigarette use; advised cessation and provided nicotine replacement therapy  - PE: b/l wheeze present , Lower extremity +1 pitting edema  - Leukocytosis (likely due to chronic steroid use)  - pt is afebrile  Gasses (venous):   PH: 7.29  PCO2: 96  HCO3: 46  - elevated bicarb consistent with hypercapnia - p/w with worsening SOB from last few days, no cough most likely from COPD EXACERBATION  - multifactorial in setting of COPD and CHF   - Likely 2/2 to cigarette use; advised cessation and provided nicotine replacement therapy  - PE: b/l wheeze present , Lower extremity +1 pitting edema  - Leukocytosis (likely due to chronic steroid use)  - pt is afebrile  Gasses (venous):PH: 7.2, PCO2: 96, HCO3: 46  - elevated bicarb consistent with hypercapnia compensation by kideys in setting of chronic co2 retaining  - will start solumedrol 40 mg q 6   -started albuterol 2 puff q 6  -started ipratropium  - no sings of infection  - no need for antibiotics  - s/p IV lasix 40 mg    - Supportive care and anti-tussives  - supplemental O2 if needed  - daily peak flow

## 2021-07-04 NOTE — H&P ADULT - PROBLEM SELECTOR PLAN 3
-patient is not on any medication for HLD  -f/u lipid panel   - if needed will initiate statin therapy

## 2021-07-04 NOTE — ED ADULT NURSE NOTE - OBJECTIVE STATEMENT
As per pt, c/o generalized weakness, SOB, BL LE swelling R>L, LLE +1 and RLE +2, dry cough, and difficulty urinating x3 days. Pt denies h/a, f/c, CP, and n/v/d. As per EMS report O2 sat 77% on RA in the field.

## 2021-07-04 NOTE — ED ADULT TRIAGE NOTE - TEMPERATURE IN FAHRENHEIT (DEGREES F)
Pt came to ED for c/o SOB. Pt was just DC today after a \"bacteria in his stomach\" Pt was getting ready for bed when he felt like he couldn't get a deep breath. PT denies chest pain or other complaints at this time    98.1

## 2021-07-05 LAB
A1C WITH ESTIMATED AVERAGE GLUCOSE RESULT: 6.5 % — HIGH (ref 4–5.6)
ALBUMIN SERPL ELPH-MCNC: 3.1 G/DL — LOW (ref 3.5–5)
ALP SERPL-CCNC: 67 U/L — SIGNIFICANT CHANGE UP (ref 40–120)
ALT FLD-CCNC: 40 U/L DA — SIGNIFICANT CHANGE UP (ref 10–60)
ANION GAP SERPL CALC-SCNC: 3 MMOL/L — LOW (ref 5–17)
AST SERPL-CCNC: 15 U/L — SIGNIFICANT CHANGE UP (ref 10–40)
BASE EXCESS BLDA CALC-SCNC: 19.8 MMOL/L — HIGH (ref -2–3)
BASE EXCESS BLDA CALC-SCNC: 22.1 MMOL/L — HIGH (ref -2–3)
BASE EXCESS BLDA CALC-SCNC: 23.2 MMOL/L — HIGH (ref -2–3)
BASOPHILS # BLD AUTO: 0.03 K/UL — SIGNIFICANT CHANGE UP (ref 0–0.2)
BASOPHILS NFR BLD AUTO: 0.2 % — SIGNIFICANT CHANGE UP (ref 0–2)
BILIRUB SERPL-MCNC: 0.2 MG/DL — SIGNIFICANT CHANGE UP (ref 0.2–1.2)
BLOOD GAS COMMENTS ARTERIAL: SIGNIFICANT CHANGE UP
BUN SERPL-MCNC: 20 MG/DL — HIGH (ref 7–18)
CALCIUM SERPL-MCNC: 9.1 MG/DL — SIGNIFICANT CHANGE UP (ref 8.4–10.5)
CHLORIDE SERPL-SCNC: 91 MMOL/L — LOW (ref 96–108)
CO2 SERPL-SCNC: 44 MMOL/L — HIGH (ref 22–31)
COVID-19 SPIKE DOMAIN AB INTERP: POSITIVE
COVID-19 SPIKE DOMAIN ANTIBODY RESULT: >250 U/ML — HIGH
CREAT SERPL-MCNC: 0.74 MG/DL — SIGNIFICANT CHANGE UP (ref 0.5–1.3)
EOSINOPHIL # BLD AUTO: 0 K/UL — SIGNIFICANT CHANGE UP (ref 0–0.5)
EOSINOPHIL NFR BLD AUTO: 0 % — SIGNIFICANT CHANGE UP (ref 0–6)
ESTIMATED AVERAGE GLUCOSE: 140 MG/DL — HIGH (ref 68–114)
FOLATE SERPL-MCNC: >20 NG/ML — SIGNIFICANT CHANGE UP
GLUCOSE BLDC GLUCOMTR-MCNC: 291 MG/DL — HIGH (ref 70–99)
GLUCOSE SERPL-MCNC: 243 MG/DL — HIGH (ref 70–99)
HCO3 BLDA-SCNC: 53 MMOL/L — CRITICAL HIGH (ref 21–28)
HCO3 BLDA-SCNC: 56 MMOL/L — CRITICAL HIGH (ref 21–28)
HCO3 BLDA-SCNC: 56 MMOL/L — CRITICAL HIGH (ref 21–28)
HCT VFR BLD CALC: 47.2 % — SIGNIFICANT CHANGE UP (ref 39–50)
HGB BLD-MCNC: 13.8 G/DL — SIGNIFICANT CHANGE UP (ref 13–17)
HOROWITZ INDEX BLDA+IHG-RTO: 28 — SIGNIFICANT CHANGE UP
HOROWITZ INDEX BLDA+IHG-RTO: 32 — SIGNIFICANT CHANGE UP
HOROWITZ INDEX BLDA+IHG-RTO: 35 — SIGNIFICANT CHANGE UP
IMM GRANULOCYTES NFR BLD AUTO: 1.8 % — HIGH (ref 0–1.5)
LACTATE SERPL-SCNC: 0.8 MMOL/L — SIGNIFICANT CHANGE UP (ref 0.7–2)
LYMPHOCYTES # BLD AUTO: 0.76 K/UL — LOW (ref 1–3.3)
LYMPHOCYTES # BLD AUTO: 6.1 % — LOW (ref 13–44)
MCHC RBC-ENTMCNC: 28.2 PG — SIGNIFICANT CHANGE UP (ref 27–34)
MCHC RBC-ENTMCNC: 29.2 GM/DL — LOW (ref 32–36)
MCV RBC AUTO: 96.5 FL — SIGNIFICANT CHANGE UP (ref 80–100)
MONOCYTES # BLD AUTO: 0.71 K/UL — SIGNIFICANT CHANGE UP (ref 0–0.9)
MONOCYTES NFR BLD AUTO: 5.7 % — SIGNIFICANT CHANGE UP (ref 2–14)
MRSA PCR RESULT.: SIGNIFICANT CHANGE UP
NEUTROPHILS # BLD AUTO: 10.83 K/UL — HIGH (ref 1.8–7.4)
NEUTROPHILS NFR BLD AUTO: 86.2 % — HIGH (ref 43–77)
NRBC # BLD: 0 /100 WBCS — SIGNIFICANT CHANGE UP (ref 0–0)
PCO2 BLDA: 108 MMHG — CRITICAL HIGH (ref 35–48)
PCO2 BLDA: 118 MMHG — CRITICAL HIGH (ref 35–48)
PCO2 BLDA: 121 MMHG — CRITICAL HIGH (ref 35–48)
PH BLDA: 7.26 — LOW (ref 7.35–7.45)
PH BLDA: 7.27 — LOW (ref 7.35–7.45)
PH BLDA: 7.32 — LOW (ref 7.35–7.45)
PLATELET # BLD AUTO: 227 K/UL — SIGNIFICANT CHANGE UP (ref 150–400)
PO2 BLDA: 46 MMHG — CRITICAL LOW (ref 83–108)
PO2 BLDA: 72 MMHG — LOW (ref 83–108)
PO2 BLDA: 79 MMHG — LOW (ref 83–108)
POTASSIUM SERPL-MCNC: 5 MMOL/L — SIGNIFICANT CHANGE UP (ref 3.5–5.3)
POTASSIUM SERPL-SCNC: 5 MMOL/L — SIGNIFICANT CHANGE UP (ref 3.5–5.3)
PROCALCITONIN SERPL-MCNC: 0.04 NG/ML — SIGNIFICANT CHANGE UP (ref 0.02–0.1)
PROCALCITONIN SERPL-MCNC: 0.06 NG/ML — SIGNIFICANT CHANGE UP (ref 0.02–0.1)
PROT SERPL-MCNC: 6.8 G/DL — SIGNIFICANT CHANGE UP (ref 6–8.3)
RBC # BLD: 4.89 M/UL — SIGNIFICANT CHANGE UP (ref 4.2–5.8)
RBC # FLD: 12.8 % — SIGNIFICANT CHANGE UP (ref 10.3–14.5)
S AUREUS DNA NOSE QL NAA+PROBE: SIGNIFICANT CHANGE UP
SAO2 % BLDA: 80 % — SIGNIFICANT CHANGE UP
SAO2 % BLDA: 94 % — SIGNIFICANT CHANGE UP
SAO2 % BLDA: 96 % — SIGNIFICANT CHANGE UP
SARS-COV-2 IGG+IGM SERPL QL IA: >250 U/ML — HIGH
SARS-COV-2 IGG+IGM SERPL QL IA: POSITIVE
SODIUM SERPL-SCNC: 138 MMOL/L — SIGNIFICANT CHANGE UP (ref 135–145)
WBC # BLD: 12.56 K/UL — HIGH (ref 3.8–10.5)
WBC # FLD AUTO: 12.56 K/UL — HIGH (ref 3.8–10.5)

## 2021-07-05 RX ORDER — CEFTRIAXONE 500 MG/1
1000 INJECTION, POWDER, FOR SOLUTION INTRAMUSCULAR; INTRAVENOUS ONCE
Refills: 0 | Status: COMPLETED | OUTPATIENT
Start: 2021-07-05 | End: 2021-07-05

## 2021-07-05 RX ORDER — INSULIN LISPRO 100/ML
VIAL (ML) SUBCUTANEOUS EVERY 6 HOURS
Refills: 0 | Status: DISCONTINUED | OUTPATIENT
Start: 2021-07-05 | End: 2021-07-08

## 2021-07-05 RX ORDER — SODIUM CHLORIDE 9 MG/ML
250 INJECTION INTRAMUSCULAR; INTRAVENOUS; SUBCUTANEOUS
Refills: 0 | Status: DISCONTINUED | OUTPATIENT
Start: 2021-07-05 | End: 2021-07-05

## 2021-07-05 RX ORDER — SODIUM CHLORIDE 9 MG/ML
500 INJECTION INTRAMUSCULAR; INTRAVENOUS; SUBCUTANEOUS
Refills: 0 | Status: DISCONTINUED | OUTPATIENT
Start: 2021-07-05 | End: 2021-07-05

## 2021-07-05 RX ORDER — AZITHROMYCIN 500 MG/1
TABLET, FILM COATED ORAL
Refills: 0 | Status: DISCONTINUED | OUTPATIENT
Start: 2021-07-05 | End: 2021-07-05

## 2021-07-05 RX ORDER — CEFTRIAXONE 500 MG/1
INJECTION, POWDER, FOR SOLUTION INTRAMUSCULAR; INTRAVENOUS
Refills: 0 | Status: DISCONTINUED | OUTPATIENT
Start: 2021-07-05 | End: 2021-07-05

## 2021-07-05 RX ORDER — PIPERACILLIN AND TAZOBACTAM 4; .5 G/20ML; G/20ML
3.38 INJECTION, POWDER, LYOPHILIZED, FOR SOLUTION INTRAVENOUS ONCE
Refills: 0 | Status: COMPLETED | OUTPATIENT
Start: 2021-07-05 | End: 2021-07-05

## 2021-07-05 RX ORDER — AZITHROMYCIN 500 MG/1
500 TABLET, FILM COATED ORAL ONCE
Refills: 0 | Status: COMPLETED | OUTPATIENT
Start: 2021-07-05 | End: 2021-07-05

## 2021-07-05 RX ADMIN — ALBUTEROL 2 PUFF(S): 90 AEROSOL, METERED ORAL at 10:35

## 2021-07-05 RX ADMIN — Medication 40 MILLIGRAM(S): at 11:20

## 2021-07-05 RX ADMIN — Medication 40 MILLIGRAM(S): at 17:26

## 2021-07-05 RX ADMIN — OXYCODONE AND ACETAMINOPHEN 2 TABLET(S): 5; 325 TABLET ORAL at 19:01

## 2021-07-05 RX ADMIN — Medication 2: at 11:20

## 2021-07-05 RX ADMIN — AZITHROMYCIN 255 MILLIGRAM(S): 500 TABLET, FILM COATED ORAL at 06:30

## 2021-07-05 RX ADMIN — Medication 1 PATCH: at 07:17

## 2021-07-05 RX ADMIN — ENOXAPARIN SODIUM 40 MILLIGRAM(S): 100 INJECTION SUBCUTANEOUS at 11:20

## 2021-07-05 RX ADMIN — Medication 1 PATCH: at 11:40

## 2021-07-05 RX ADMIN — ALBUTEROL 2 PUFF(S): 90 AEROSOL, METERED ORAL at 17:26

## 2021-07-05 RX ADMIN — Medication 0.5 MILLIGRAM(S): at 21:10

## 2021-07-05 RX ADMIN — PIPERACILLIN AND TAZOBACTAM 200 GRAM(S): 4; .5 INJECTION, POWDER, LYOPHILIZED, FOR SOLUTION INTRAVENOUS at 20:12

## 2021-07-05 RX ADMIN — BUDESONIDE AND FORMOTEROL FUMARATE DIHYDRATE 2 PUFF(S): 160; 4.5 AEROSOL RESPIRATORY (INHALATION) at 11:39

## 2021-07-05 RX ADMIN — Medication 1 PATCH: at 19:48

## 2021-07-05 RX ADMIN — OXYCODONE AND ACETAMINOPHEN 2 TABLET(S): 5; 325 TABLET ORAL at 18:36

## 2021-07-05 RX ADMIN — Medication 2: at 17:25

## 2021-07-05 RX ADMIN — Medication 20 MILLIGRAM(S): at 06:31

## 2021-07-05 RX ADMIN — SIMETHICONE 80 MILLIGRAM(S): 80 TABLET, CHEWABLE ORAL at 06:31

## 2021-07-05 RX ADMIN — PANTOPRAZOLE SODIUM 40 MILLIGRAM(S): 20 TABLET, DELAYED RELEASE ORAL at 06:31

## 2021-07-05 RX ADMIN — Medication 3: at 08:16

## 2021-07-05 RX ADMIN — Medication 40 MILLIGRAM(S): at 06:31

## 2021-07-05 RX ADMIN — CEFTRIAXONE 100 MILLIGRAM(S): 500 INJECTION, POWDER, FOR SOLUTION INTRAMUSCULAR; INTRAVENOUS at 06:30

## 2021-07-05 RX ADMIN — SODIUM CHLORIDE 250 MILLILITER(S): 9 INJECTION INTRAMUSCULAR; INTRAVENOUS; SUBCUTANEOUS at 05:58

## 2021-07-05 RX ADMIN — Medication 150 MILLIGRAM(S): at 21:11

## 2021-07-05 RX ADMIN — Medication 3 MILLIGRAM(S): at 21:11

## 2021-07-05 NOTE — CHART NOTE - NSCHARTNOTEFT_GEN_A_CORE
Was alerted by covering provider that patient was hypoxic. On arrival to floor, patient noted to have tourniquet over arm that oxygen saturation was being measured, however despite changing arms, patient noted to be hypoxic and with altered mental status. Was arousable however fell back asleep quickly, suspicious for CO2 narcosis. ABG was performed, however unclear if sample was venous or arterial. Regardless, patient CO2 noted to be 121. Respiratory was called and patient to be started on BiPAP for CO2 retention. Attending, Dr. Ramos was called, and patient noted not to be on BiPAP at [facility]. ICU consult to follow.
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
## 2021-07-05 NOTE — CONSULT NOTE ADULT - ATTENDING COMMENTS
Assessment:  1. Acute on chronic hypercapnic and hypoxic respiratory failure  2. COPD   3. Heart failure with preserved EF   4. Hypertension  5. CAD   6. Diabetes mellitus     Plan  - Place on bipap support  - monitor ABG  - Bronchodilators  - IV steroids  - Doubt PNA, as afebrile and no leukocytosis, low procalcitonin  - Azithromycin for possible anti inflammatory affect  - Appears euvolemic, hold lasix for now   - Cont. Psych medications  - Oral diet when off bipap   - DVT and stress ulcer prophylaxis  - Discussed with Sister on the phone and updated about change in clinical status. She expressed appreciation
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
## 2021-07-05 NOTE — CHART NOTE - NSCHARTNOTEFT_GEN_A_CORE
EVENT: Pt with purse lip breathing, hypoxic to upper 80's on NC 2 l/min. Consult by ICU called by Dr Bryant.    BRIEF HPI: 60 YOM, smoker, with history of COPD, CHF, CAD with stents, DM, obesity, HLD, psychiatric hx, admitted for COPD exacerbation. Now retaining CO2.    Vital Signs Last 24 Hrs  T(C): 36.6 (05 Jul 2021 06:26), Max: 36.7 (04 Jul 2021 11:11)  T(F): 97.8 (05 Jul 2021 06:26), Max: 98.1 (04 Jul 2021 11:11)  HR: 116 (05 Jul 2021 06:26) (108 - 121)  BP: 119/48 (05 Jul 2021 06:26) (100/67 - 133/70)  BP(mean): --  RR: 28 (05 Jul 2021 06:26) (20 - 28)  SpO2: 86% (05 Jul 2021 07:24) (76% - 100%)    ABG - ( 05 Jul 2021 07:20 )  pH, Arterial: 7.27  pH, Blood: x     /  pCO2: 121   /  pO2: 46    / HCO3: 56    / Base Excess: 22.1  /  SaO2: 80        PLAN:  1. ICU consult

## 2021-07-05 NOTE — CONSULT NOTE ADULT - ASSESSMENT
60 YOM with a h/o COPD on 2L home O2, CAD with stent, CHF, DM, GERD, HLD, pHTN,  asthma, anxiety, and panic attacks was brought in by ambulette from his assisted living facility for shortness of breath was admitted for COPD exacerbation. He was found to be encephalopathic, had co2 retention. Patient is being transferred to ICU for BiPAP placement.    #Acute encephalopathy  #COPD exacerbation  #CHF  #CAD  #DM  #HTN    Neuro:  Patient was found to altered mental status.  OE, patient is awake, drowsy and lethargic. Aox0  ABG showed co2 retention of 121  Will start on BiPAP and reassess mental status     CVS:  Patient has h/o CHF, on lasix at home  Patient was found to have mild pleural effusion on R base  Was started on lasix 20mg IV daily.  Does not look fluid overloaded, not in CHF exacerbation  Also has stented artery    Pulmonology:  Patient was found to have AMS, 2/2 to CO2 retention  has been having multiple exacerbations, >2 in past year, class D COPD   Will start on BiPAP  Patient is on Symbicort and albuterol  C/w methylprednisolone 40mg q6  Monitor respiratory status    GI:  No issues  NPO while on BIPAP    Endo:  Has h/o DM  Fq6 while NPO  c/w sliding scale    Renal:  Has respiratory acidosis with ph of 7.27  Patient is compensating  Will monitor and repeat ABG after starting BiPAP    Prophylactic:  On lovenox   on PPI    GOC:  Full code

## 2021-07-06 LAB
ANION GAP SERPL CALC-SCNC: 1 MMOL/L — LOW (ref 5–17)
ANISOCYTOSIS BLD QL: SLIGHT — SIGNIFICANT CHANGE UP
BASE EXCESS BLDA CALC-SCNC: 21.1 MMOL/L — HIGH (ref -2–3)
BASE EXCESS BLDA CALC-SCNC: 23.5 MMOL/L — HIGH (ref -2–3)
BASE EXCESS BLDA CALC-SCNC: 23.8 MMOL/L — HIGH (ref -2–3)
BASOPHILS # BLD AUTO: 0.02 K/UL — SIGNIFICANT CHANGE UP (ref 0–0.2)
BASOPHILS NFR BLD AUTO: 0.2 % — SIGNIFICANT CHANGE UP (ref 0–2)
BLOOD GAS COMMENTS ARTERIAL: SIGNIFICANT CHANGE UP
BUN SERPL-MCNC: 23 MG/DL — HIGH (ref 7–18)
CALCIUM SERPL-MCNC: 8.9 MG/DL — SIGNIFICANT CHANGE UP (ref 8.4–10.5)
CHLORIDE SERPL-SCNC: 91 MMOL/L — LOW (ref 96–108)
CO2 SERPL-SCNC: 45 MMOL/L — CRITICAL HIGH (ref 22–31)
CREAT SERPL-MCNC: 0.52 MG/DL — SIGNIFICANT CHANGE UP (ref 0.5–1.3)
EOSINOPHIL # BLD AUTO: 0.1 K/UL — SIGNIFICANT CHANGE UP (ref 0–0.5)
EOSINOPHIL NFR BLD AUTO: 0.8 % — SIGNIFICANT CHANGE UP (ref 0–6)
GLUCOSE SERPL-MCNC: 228 MG/DL — HIGH (ref 70–99)
HCO3 BLDA-SCNC: 54 MMOL/L — CRITICAL HIGH (ref 21–28)
HCO3 BLDA-SCNC: 55 MMOL/L — CRITICAL HIGH (ref 21–28)
HCO3 BLDA-SCNC: 55 MMOL/L — CRITICAL HIGH (ref 21–28)
HCT VFR BLD CALC: 43.7 % — SIGNIFICANT CHANGE UP (ref 39–50)
HGB BLD-MCNC: 12.6 G/DL — LOW (ref 13–17)
HOROWITZ INDEX BLDA+IHG-RTO: 30 — SIGNIFICANT CHANGE UP
HOROWITZ INDEX BLDA+IHG-RTO: 40 — SIGNIFICANT CHANGE UP
HOROWITZ INDEX BLDA+IHG-RTO: SIGNIFICANT CHANGE UP
IMM GRANULOCYTES NFR BLD AUTO: 1.1 % — SIGNIFICANT CHANGE UP (ref 0–1.5)
LYMPHOCYTES # BLD AUTO: 0.77 K/UL — LOW (ref 1–3.3)
LYMPHOCYTES # BLD AUTO: 6.4 % — LOW (ref 13–44)
MACROCYTES BLD QL: SLIGHT — SIGNIFICANT CHANGE UP
MAGNESIUM SERPL-MCNC: 2.4 MG/DL — SIGNIFICANT CHANGE UP (ref 1.6–2.6)
MANUAL SMEAR VERIFICATION: SIGNIFICANT CHANGE UP
MCHC RBC-ENTMCNC: 28.3 PG — SIGNIFICANT CHANGE UP (ref 27–34)
MCHC RBC-ENTMCNC: 28.8 GM/DL — LOW (ref 32–36)
MCV RBC AUTO: 98.2 FL — SIGNIFICANT CHANGE UP (ref 80–100)
MONOCYTES # BLD AUTO: 0.51 K/UL — SIGNIFICANT CHANGE UP (ref 0–0.9)
MONOCYTES NFR BLD AUTO: 4.2 % — SIGNIFICANT CHANGE UP (ref 2–14)
NEUTROPHILS # BLD AUTO: 10.49 K/UL — HIGH (ref 1.8–7.4)
NEUTROPHILS NFR BLD AUTO: 87.3 % — HIGH (ref 43–77)
NRBC # BLD: 0 /100 WBCS — SIGNIFICANT CHANGE UP (ref 0–0)
PCO2 BLDA: 100 MMHG — CRITICAL HIGH (ref 35–48)
PCO2 BLDA: 122 MMHG — CRITICAL HIGH (ref 35–48)
PCO2 BLDA: 90 MMHG — CRITICAL HIGH (ref 35–48)
PH BLDA: 7.26 — LOW (ref 7.35–7.45)
PH BLDA: 7.35 — SIGNIFICANT CHANGE UP (ref 7.35–7.45)
PH BLDA: 7.39 — SIGNIFICANT CHANGE UP (ref 7.35–7.45)
PHOSPHATE SERPL-MCNC: 3.9 MG/DL — SIGNIFICANT CHANGE UP (ref 2.5–4.5)
PLAT MORPH BLD: NORMAL — SIGNIFICANT CHANGE UP
PLATELET # BLD AUTO: 219 K/UL — SIGNIFICANT CHANGE UP (ref 150–400)
PLATELET COUNT - ESTIMATE: NORMAL — SIGNIFICANT CHANGE UP
PO2 BLDA: 58 MMHG — LOW (ref 83–108)
PO2 BLDA: 74 MMHG — LOW (ref 83–108)
PO2 BLDA: 89 MMHG — SIGNIFICANT CHANGE UP (ref 83–108)
POLYCHROMASIA BLD QL SMEAR: SLIGHT — SIGNIFICANT CHANGE UP
POTASSIUM SERPL-MCNC: 5.2 MMOL/L — SIGNIFICANT CHANGE UP (ref 3.5–5.3)
POTASSIUM SERPL-SCNC: 5.2 MMOL/L — SIGNIFICANT CHANGE UP (ref 3.5–5.3)
RBC # BLD: 4.45 M/UL — SIGNIFICANT CHANGE UP (ref 4.2–5.8)
RBC # FLD: 12.8 % — SIGNIFICANT CHANGE UP (ref 10.3–14.5)
RBC BLD AUTO: ABNORMAL
SAO2 % BLDA: 92 % — SIGNIFICANT CHANGE UP
SAO2 % BLDA: 94 % — SIGNIFICANT CHANGE UP
SAO2 % BLDA: 97 % — SIGNIFICANT CHANGE UP
SODIUM SERPL-SCNC: 137 MMOL/L — SIGNIFICANT CHANGE UP (ref 135–145)
STOMATOCYTES BLD QL SMEAR: SIGNIFICANT CHANGE UP
WBC # BLD: 12.02 K/UL — HIGH (ref 3.8–10.5)
WBC # FLD AUTO: 12.02 K/UL — HIGH (ref 3.8–10.5)

## 2021-07-06 RX ORDER — OLANZAPINE 15 MG/1
5 TABLET, FILM COATED ORAL EVERY 12 HOURS
Refills: 0 | Status: DISCONTINUED | OUTPATIENT
Start: 2021-07-06 | End: 2021-07-15

## 2021-07-06 RX ORDER — DEXMEDETOMIDINE HYDROCHLORIDE IN 0.9% SODIUM CHLORIDE 4 UG/ML
0.2 INJECTION INTRAVENOUS
Qty: 400 | Refills: 0 | Status: DISCONTINUED | OUTPATIENT
Start: 2021-07-06 | End: 2021-07-09

## 2021-07-06 RX ORDER — OLANZAPINE 15 MG/1
2.5 TABLET, FILM COATED ORAL ONCE
Refills: 0 | Status: COMPLETED | OUTPATIENT
Start: 2021-07-06 | End: 2021-07-06

## 2021-07-06 RX ORDER — OLANZAPINE 15 MG/1
5 TABLET, FILM COATED ORAL EVERY 12 HOURS
Refills: 0 | Status: COMPLETED | OUTPATIENT
Start: 2021-07-06 | End: 2021-07-06

## 2021-07-06 RX ORDER — IPRATROPIUM/ALBUTEROL SULFATE 18-103MCG
3 AEROSOL WITH ADAPTER (GRAM) INHALATION EVERY 4 HOURS
Refills: 0 | Status: DISCONTINUED | OUTPATIENT
Start: 2021-07-06 | End: 2021-07-07

## 2021-07-06 RX ORDER — HALOPERIDOL DECANOATE 100 MG/ML
5 INJECTION INTRAMUSCULAR ONCE
Refills: 0 | Status: COMPLETED | OUTPATIENT
Start: 2021-07-06 | End: 2021-07-06

## 2021-07-06 RX ORDER — DEXMEDETOMIDINE HYDROCHLORIDE IN 0.9% SODIUM CHLORIDE 4 UG/ML
0.2 INJECTION INTRAVENOUS
Qty: 200 | Refills: 0 | Status: DISCONTINUED | OUTPATIENT
Start: 2021-07-06 | End: 2021-07-06

## 2021-07-06 RX ORDER — ALPRAZOLAM 0.25 MG
0.5 TABLET ORAL ONCE
Refills: 0 | Status: DISCONTINUED | OUTPATIENT
Start: 2021-07-06 | End: 2021-07-06

## 2021-07-06 RX ORDER — HALOPERIDOL DECANOATE 100 MG/ML
2 INJECTION INTRAMUSCULAR ONCE
Refills: 0 | Status: COMPLETED | OUTPATIENT
Start: 2021-07-06 | End: 2021-07-06

## 2021-07-06 RX ORDER — IPRATROPIUM/ALBUTEROL SULFATE 18-103MCG
3 AEROSOL WITH ADAPTER (GRAM) INHALATION ONCE
Refills: 0 | Status: DISCONTINUED | OUTPATIENT
Start: 2021-07-06 | End: 2021-07-06

## 2021-07-06 RX ADMIN — DEXMEDETOMIDINE HYDROCHLORIDE IN 0.9% SODIUM CHLORIDE 4.72 MICROGRAM(S)/KG/HR: 4 INJECTION INTRAVENOUS at 21:08

## 2021-07-06 RX ADMIN — OLANZAPINE 2.5 MILLIGRAM(S): 15 TABLET, FILM COATED ORAL at 20:05

## 2021-07-06 RX ADMIN — OLANZAPINE 5 MILLIGRAM(S): 15 TABLET, FILM COATED ORAL at 09:50

## 2021-07-06 RX ADMIN — ENOXAPARIN SODIUM 40 MILLIGRAM(S): 100 INJECTION SUBCUTANEOUS at 11:18

## 2021-07-06 RX ADMIN — Medication 3 MILLILITER(S): at 12:14

## 2021-07-06 RX ADMIN — OXYCODONE AND ACETAMINOPHEN 2 TABLET(S): 5; 325 TABLET ORAL at 08:15

## 2021-07-06 RX ADMIN — Medication 1 TABLET(S): at 11:18

## 2021-07-06 RX ADMIN — SIMETHICONE 80 MILLIGRAM(S): 80 TABLET, CHEWABLE ORAL at 00:16

## 2021-07-06 RX ADMIN — Medication 1 PATCH: at 11:23

## 2021-07-06 RX ADMIN — Medication 1 PATCH: at 19:17

## 2021-07-06 RX ADMIN — Medication 40 MILLIGRAM(S): at 17:43

## 2021-07-06 RX ADMIN — Medication 3: at 23:59

## 2021-07-06 RX ADMIN — OLANZAPINE 5 MILLIGRAM(S): 15 TABLET, FILM COATED ORAL at 17:43

## 2021-07-06 RX ADMIN — Medication 2 MILLIGRAM(S): at 14:01

## 2021-07-06 RX ADMIN — Medication 1 MILLIGRAM(S): at 20:54

## 2021-07-06 RX ADMIN — SIMETHICONE 80 MILLIGRAM(S): 80 TABLET, CHEWABLE ORAL at 11:15

## 2021-07-06 RX ADMIN — Medication 40 MILLIGRAM(S): at 00:16

## 2021-07-06 RX ADMIN — OXYCODONE AND ACETAMINOPHEN 2 TABLET(S): 5; 325 TABLET ORAL at 10:00

## 2021-07-06 RX ADMIN — Medication 1 PATCH: at 07:20

## 2021-07-06 RX ADMIN — OLANZAPINE 5 MILLIGRAM(S): 15 TABLET, FILM COATED ORAL at 20:04

## 2021-07-06 RX ADMIN — Medication 40 MILLIGRAM(S): at 05:52

## 2021-07-06 RX ADMIN — Medication 3: at 18:30

## 2021-07-06 RX ADMIN — Medication 0.5 MILLIGRAM(S): at 14:01

## 2021-07-06 RX ADMIN — Medication 3 MILLILITER(S): at 17:28

## 2021-07-06 RX ADMIN — HALOPERIDOL DECANOATE 2 MILLIGRAM(S): 100 INJECTION INTRAMUSCULAR at 20:54

## 2021-07-06 RX ADMIN — Medication 2: at 05:52

## 2021-07-06 RX ADMIN — Medication 40 MILLIGRAM(S): at 11:19

## 2021-07-06 RX ADMIN — Medication 1: at 11:21

## 2021-07-06 RX ADMIN — ALBUTEROL 2 PUFF(S): 90 AEROSOL, METERED ORAL at 08:07

## 2021-07-06 RX ADMIN — Medication 3 MILLILITER(S): at 21:50

## 2021-07-06 RX ADMIN — Medication 3: at 00:15

## 2021-07-06 RX ADMIN — Medication 3 MILLILITER(S): at 14:09

## 2021-07-06 RX ADMIN — RISPERIDONE 1 MILLIGRAM(S): 4 TABLET ORAL at 11:18

## 2021-07-06 RX ADMIN — Medication 81 MILLIGRAM(S): at 11:17

## 2021-07-06 RX ADMIN — DEXMEDETOMIDINE HYDROCHLORIDE IN 0.9% SODIUM CHLORIDE 4.72 MICROGRAM(S)/KG/HR: 4 INJECTION INTRAVENOUS at 12:06

## 2021-07-06 RX ADMIN — HALOPERIDOL DECANOATE 5 MILLIGRAM(S): 100 INJECTION INTRAMUSCULAR at 08:06

## 2021-07-06 NOTE — ADVANCED PRACTICE NURSE CONSULT - RECOMMEDATIONS
Detail Level: Detailed
Add 00337 Cpt? (Important Note: In 2017 The Use Of 75069 Is Being Tracked By Cms To Determine Future Global Period Reimbursement For Global Periods): yes
-Encourage the patient to reposition Q 2hrs using wedges or pillows

## 2021-07-06 NOTE — PROGRESS NOTE ADULT - ASSESSMENT
60 YOM with a h/o COPD on 2L home O2, CAD with stent, CHF, DM, GERD, HLD, pHTN,  asthma, anxiety, and panic attacks was brought in by ambulette from his assisted living facility for shortness of breath was admitted for COPD exacerbation. He was found to be encephalopathic, had co2 retention. Patient is being transferred to ICU for BiPAP placement.    #Acute encephalopathy  #COPD exacerbation  #CHF  #CAD  #DM  #HTN    Neuro:  Patient was found to altered mental status.  OE, patient is awake, drowsy and lethargic. Aox0  ABG showed co2 retention of 121  Will start on BiPAP and reassess mental status     CVS:  Patient has h/o CHF, on lasix at home  Patient was found to have mild pleural effusion on R base  Was started on lasix 20mg IV daily.  Does not look fluid overloaded, not in CHF exacerbation  Also has stented artery    Pulmonology:  Patient was found to have AMS, 2/2 to CO2 retention  has been having multiple exacerbations, >2 in past year, class D COPD   Will start on BiPAP  Patient is on Symbicort and albuterol  C/w methylprednisolone 40mg q6  Monitor respiratory status    GI:  No issues  NPO while on BIPAP    Endo:  Has h/o DM  Fq6 while NPO  c/w sliding scale    Renal:  Has respiratory acidosis with ph of 7.27  Patient is compensating  Will monitor and repeat ABG after starting BiPAP    Prophylactic:  On lovenox   on PPI    GOC:  Full code 60 YOM with a h/o COPD on 2L home O2, CAD with stent, CHF, DM, GERD, HLD, pHTN,  asthma, anxiety, and panic attacks was brought in by ambulette from his assisted living facility for shortness of breath was admitted for COPD exacerbation. He was found to be encephalopathic, had co2 retention. Patient is being transferred to ICU for BiPAP placement.    #Acute encephalopathy  #COPD exacerbation  #CHF  #CAD  #DM  #HTN    #Neuro:  Patient was found to altered mental status.  Since resolved  Maintain BiPAP and high flow o2  Patient agitated and placed on 1:1    #CVS:  Patient has h/o CHF, on lasix at home  Patient was found to have mild pleural effusion on R base  Lasix stopped  Does not look fluid overloaded, not in CHF exacerbation  Also has stented artery  echo details above: normal LV size and function, RV enlargement with normal function, RV systolic pressure 59 mm Hg, moderate P      Pulmonology:  Patient was found to have AMS, 2/2 to CO2 retention  has been having multiple exacerbations, >2 in past year, class D COPD   Patient noncompliant with BIPAP  7.35|100 |58|55  f/u repeat abg  d/c Symbicort  add duoneb  C/w methylprednisolone 40mg q6  Monitor respiratory status    GI:  No issues  NPO while on BIPAP    Endo:  Has h/o DM  Fq6 while NPO  c/w sliding scale    Renal:  Has respiratory acidosis with ph of 7.27  Patient is compensating  Will monitor and repeat ABG after starting BiPAP    Prophylactic:  On lovenox   on PPI    GOC:  Full code 60 YOM with a h/o COPD on 2L home O2, CAD with stent, CHF, DM, GERD, HLD, pHTN,  asthma, anxiety, and panic attacks was brought in by ambulette from his assisted living facility for shortness of breath was admitted for COPD exacerbation. He was found to be encephalopathic, had co2 retention. Patient is being transferred to ICU for BiPAP placement.    #Acute encephalopathy  #COPD exacerbation  #CHF  #CAD  #DM  #HTN    #Neuro:  Patient was found to altered mental status.  Since resolved  Maintain BiPAP and high flow o2  Patient agitated and placed on 1:1    #CVS:  Patient has h/o CHF, on lasix at home  Patient was found to have mild pleural effusion on R base  Lasix stopped  Does not look fluid overloaded, not in CHF exacerbation  Also has stented artery  echo details above: normal LV size and function, RV enlargement with normal function, RV systolic pressure 59 mm Hg, moderate P      Pulmonology:  Patient was found to have AMS, 2/2 to CO2 retention  has been having multiple exacerbations, >2 in past year, class D COPD   Patient noncompliant with BIPAP  7.35|100 |58|55  f/u repeat abg  d/c Symbicort  add duoneb  C/w methylprednisolone 40mg q6  Monitor respiratory status    GI:  No issues  NPO while on BIPAP    Endo:  Has h/o DM  Fq6 while NPO  c/w sliding scale    Renal:  Has respiratory acidosis with ph of 7.2  Since improved to ph 7.35  Will monitor and repeat ABG    Prophylactic:  On lovenox   on PPI    GOC:  Full code
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
## 2021-07-06 NOTE — PROGRESS NOTE ADULT - SUBJECTIVE AND OBJECTIVE BOX
INTERVAL HPI/OVERNIGHT EVENTS: Pat    PRESSORS: [ ] YES [ ] NO  WHICH:      Antimicrobial:    Cardiovascular:    Pulmonary:  albuterol/ipratropium for Nebulization 3 milliLiter(s) Nebulizer every 4 hours    Hematalogic:  aspirin  chewable 81 milliGRAM(s) Oral daily  enoxaparin Injectable 40 milliGRAM(s) SubCutaneous daily    Other:  ALPRAZolam 0.5 milliGRAM(s) Oral at bedtime  dexMEDEtomidine Infusion 0.2 MICROgram(s)/kG/Hr IV Continuous <Continuous>  insulin lispro (ADMELOG) corrective regimen sliding scale   SubCutaneous every 6 hours  insulin lispro (ADMELOG) corrective regimen sliding scale   SubCutaneous at bedtime  melatonin 3 milliGRAM(s) Oral at bedtime  methylPREDNISolone sodium succinate Injectable 40 milliGRAM(s) IV Push every 6 hours  multivitamin/minerals 1 Tablet(s) Oral daily  nicotine -   7 mG/24Hr(s) Patch 1 patch Transdermal daily  OLANZapine Injectable 5 milliGRAM(s) IntraMuscular every 12 hours  oxycodone    5 mG/acetaminophen 325 mG 2 Tablet(s) Oral every 6 hours PRN  pantoprazole    Tablet 40 milliGRAM(s) Oral before breakfast  polyethylene glycol 3350 17 Gram(s) Oral daily  risperiDONE   Tablet 1 milliGRAM(s) Oral daily  simethicone 80 milliGRAM(s) Chew every 6 hours  traZODone 150 milliGRAM(s) Oral at bedtime    albuterol/ipratropium for Nebulization 3 milliLiter(s) Nebulizer every 4 hours  ALPRAZolam 0.5 milliGRAM(s) Oral at bedtime  aspirin  chewable 81 milliGRAM(s) Oral daily  dexMEDEtomidine Infusion 0.2 MICROgram(s)/kG/Hr IV Continuous <Continuous>  enoxaparin Injectable 40 milliGRAM(s) SubCutaneous daily  insulin lispro (ADMELOG) corrective regimen sliding scale   SubCutaneous every 6 hours  insulin lispro (ADMELOG) corrective regimen sliding scale   SubCutaneous at bedtime  melatonin 3 milliGRAM(s) Oral at bedtime  methylPREDNISolone sodium succinate Injectable 40 milliGRAM(s) IV Push every 6 hours  multivitamin/minerals 1 Tablet(s) Oral daily  nicotine -   7 mG/24Hr(s) Patch 1 patch Transdermal daily  OLANZapine Injectable 5 milliGRAM(s) IntraMuscular every 12 hours  oxycodone    5 mG/acetaminophen 325 mG 2 Tablet(s) Oral every 6 hours PRN  pantoprazole    Tablet 40 milliGRAM(s) Oral before breakfast  polyethylene glycol 3350 17 Gram(s) Oral daily  risperiDONE   Tablet 1 milliGRAM(s) Oral daily  simethicone 80 milliGRAM(s) Chew every 6 hours  traZODone 150 milliGRAM(s) Oral at bedtime    Drug Dosing Weight  Height (cm): 172.7 (05 Jul 2021 09:15)  Weight (kg): 94.3 (05 Jul 2021 09:15)  BMI (kg/m2): 31.6 (05 Jul 2021 09:15)  BSA (m2): 2.08 (05 Jul 2021 09:15)    CENTRAL LINE: [ ] YES [ ] NO  LOCATION:         AMOR: [ ] YES [ ] NO          A-LINE:  [ ] YES [ ] NO  LOCATION:             ICU Vital Signs Last 24 Hrs  T(C): 36.4 (06 Jul 2021 07:00), Max: 36.4 (06 Jul 2021 07:00)  T(F): 97.5 (06 Jul 2021 07:00), Max: 97.5 (06 Jul 2021 07:00)  HR: 122 (06 Jul 2021 14:34) (93 - 122)  BP: 109/78 (06 Jul 2021 14:00) (92/35 - 131/62)  BP(mean): 82 (06 Jul 2021 14:00) (48 - 92)  ABP: --  ABP(mean): --  RR: 23 (06 Jul 2021 14:00) (15 - 39)  SpO2: 93% (06 Jul 2021 14:34) (68% - 99%)      ABG - ( 06 Jul 2021 11:53 )  pH, Arterial: 7.39  pH, Blood: x     /  pCO2: 90    /  pO2: 74    / HCO3: 54    / Base Excess: 23.8  /  SaO2: 94                    07-05 @ 07:01  -  07-06 @ 07:00  --------------------------------------------------------  IN: 550 mL / OUT: 1535 mL / NET: -985 mL            REVIEW OF SYSTEMS:    CONSTITUTIONAL: No weakness, fevers or chills  NECK: No pain or stiffness  RESPIRATORY: No cough, wheezing, hemoptysis; No shortness of breath  CARDIOVASCULAR: No chest pain or palpitations  GASTROINTESTINAL: No abdominal or epigastric pain. No nausea, vomiting, No diarrhea or constipation. No melena or hematochezia.  GENITOURINARY: No dysuria, frequency or hematuria  NEUROLOGICAL: No numbness or weakness  All other review of systems is negative unless indicated above      PHYSICAL EXAM:    GENERAL: NAD, well-groomed, well-developed  EYES: EOMI, PERRLA,   NECK: Supple, No JVD; Normal thyroid; Trachea midline  NERVOUS SYSTEM:  Alert & Oriented X3,  Motor Strength 5/5 B/L upper and lower extremities; DTRs 2+ intact and symmetric  CHEST/LUNG: No rales, rhonchi, wheezing   HEART: Regular rate and rhythm; No murmurs,   ABDOMEN: Soft, Nontender, Nondistended; Bowel sounds present  EXTREMITIES:  2+ Peripheral Pulses, No clubbing, cyanosis, or edema        LABS:  CBC Full  -  ( 06 Jul 2021 04:33 )  WBC Count : 12.02 K/uL  RBC Count : 4.45 M/uL  Hemoglobin : 12.6 g/dL  Hematocrit : 43.7 %  Platelet Count - Automated : 219 K/uL  Mean Cell Volume : 98.2 fl  Mean Cell Hemoglobin : 28.3 pg  Mean Cell Hemoglobin Concentration : 28.8 gm/dL  Auto Neutrophil # : 10.49 K/uL  Auto Lymphocyte # : 0.77 K/uL  Auto Monocyte # : 0.51 K/uL  Auto Eosinophil # : 0.10 K/uL  Auto Basophil # : 0.02 K/uL  Auto Neutrophil % : 87.3 %  Auto Lymphocyte % : 6.4 %  Auto Monocyte % : 4.2 %  Auto Eosinophil % : 0.8 %  Auto Basophil % : 0.2 %    07-06    137  |  91<L>  |  23<H>  ----------------------------<  228<H>  5.2   |  45<HH>  |  0.52    Ca    8.9      06 Jul 2021 04:33  Phos  3.9     07-06  Mg     2.4     07-06    TPro  6.8  /  Alb  3.1<L>  /  TBili  0.2  /  DBili  x   /  AST  15  /  ALT  40  /  AlkPhos  67  07-05            RADIOLOGY & ADDITIONAL STUDIES REVIEWED:  ***    [ ]GOALS OF CARE DISCUSSION WITH PATIENT/FAMILY/PROXY:    CRITICAL CARE TIME SPENT: 35 minutes INTERVAL HPI/OVERNIGHT EVENTS: Patient uncooperative with bipap    PRESSORS: [ ] YES [x ] NO  WHICH:      Antimicrobial:    Cardiovascular:    Pulmonary:  albuterol 90 micrograms(s) HFA Inhaler - 2 Puff(s) Inhalation every 6 hours    Hematalogic:  aspirin  chewable 81 milliGRAM(s) Oral daily  enoxaparin Injectable 40 milliGRAM(s) SubCutaneous daily    Other:  ALPRAZolam 0.5 milliGRAM(s) Oral at bedtime  dexMEDEtomidine Infusion 0.2 MICROgram(s)/kG/Hr IV Continuous <Continuous>  insulin lispro (ADMELOG) corrective regimen sliding scale   SubCutaneous every 6 hours  insulin lispro (ADMELOG) corrective regimen sliding scale   SubCutaneous at bedtime  melatonin 3 milliGRAM(s) Oral at bedtime  methylPREDNISolone sodium succinate Injectable 40 milliGRAM(s) IV Push every 6 hours  multivitamin/minerals 1 Tablet(s) Oral daily  nicotine -   7 mG/24Hr(s) Patch 1 patch Transdermal daily  OLANZapine Injectable 5 milliGRAM(s) IntraMuscular every 12 hours  oxycodone    5 mG/acetaminophen 325 mG 2 Tablet(s) Oral every 6 hours PRN  pantoprazole    Tablet 40 milliGRAM(s) Oral before breakfast  polyethylene glycol 3350 17 Gram(s) Oral daily  risperiDONE   Tablet 1 milliGRAM(s) Oral daily  simethicone 80 milliGRAM(s) Chew every 6 hours  traZODone 150 milliGRAM(s) Oral at bedtime    albuterol/ipratropium for Nebulization 3 milliLiter(s) Nebulizer every 4 hours  ALPRAZolam 0.5 milliGRAM(s) Oral at bedtime  aspirin  chewable 81 milliGRAM(s) Oral daily  dexMEDEtomidine Infusion 0.2 MICROgram(s)/kG/Hr IV Continuous <Continuous>  enoxaparin Injectable 40 milliGRAM(s) SubCutaneous daily  insulin lispro (ADMELOG) corrective regimen sliding scale   SubCutaneous every 6 hours  insulin lispro (ADMELOG) corrective regimen sliding scale   SubCutaneous at bedtime  melatonin 3 milliGRAM(s) Oral at bedtime  methylPREDNISolone sodium succinate Injectable 40 milliGRAM(s) IV Push every 6 hours  multivitamin/minerals 1 Tablet(s) Oral daily  nicotine -   7 mG/24Hr(s) Patch 1 patch Transdermal daily  OLANZapine Injectable 5 milliGRAM(s) IntraMuscular every 12 hours  oxycodone    5 mG/acetaminophen 325 mG 2 Tablet(s) Oral every 6 hours PRN  pantoprazole    Tablet 40 milliGRAM(s) Oral before breakfast  polyethylene glycol 3350 17 Gram(s) Oral daily  risperiDONE   Tablet 1 milliGRAM(s) Oral daily  simethicone 80 milliGRAM(s) Chew every 6 hours  traZODone 150 milliGRAM(s) Oral at bedtime    Drug Dosing Weight  Height (cm): 172.7 (05 Jul 2021 09:15)  Weight (kg): 94.3 (05 Jul 2021 09:15)  BMI (kg/m2): 31.6 (05 Jul 2021 09:15)  BSA (m2): 2.08 (05 Jul 2021 09:15)    CENTRAL LINE: [ ] YES [ ] NO  LOCATION:         AMOR: [ ] YES [ ] NO          A-LINE:  [ ] YES [ ] NO  LOCATION:             ICU Vital Signs Last 24 Hrs  T(C): 36.4 (06 Jul 2021 07:00), Max: 36.4 (06 Jul 2021 07:00)  T(F): 97.5 (06 Jul 2021 07:00), Max: 97.5 (06 Jul 2021 07:00)  HR: 122 (06 Jul 2021 14:34) (93 - 122)  BP: 109/78 (06 Jul 2021 14:00) (92/35 - 131/62)  BP(mean): 82 (06 Jul 2021 14:00) (48 - 92)  ABP: --  ABP(mean): --  RR: 23 (06 Jul 2021 14:00) (15 - 39)  SpO2: 93% (06 Jul 2021 14:34) (68% - 99%)      ABG - ( 06 Jul 2021 11:53 )  pH, Arterial: 7.39  pH, Blood: x     /  pCO2: 90    /  pO2: 74    / HCO3: 54    / Base Excess: 23.8  /  SaO2: 94                    07-05 @ 07:01  -  07-06 @ 07:00  --------------------------------------------------------  IN: 550 mL / OUT: 1535 mL / NET: -985 mL            REVIEW OF SYSTEMS:    CONSTITUTIONAL: No weakness, fevers or chills  NECK: No pain or stiffness  RESPIRATORY: No cough, wheezing, hemoptysis; No shortness of breath  CARDIOVASCULAR: No chest pain or palpitations  GASTROINTESTINAL: No abdominal or epigastric pain. No nausea, vomiting, No diarrhea or constipation. No melena or hematochezia.  GENITOURINARY: No dysuria, frequency or hematuria  NEUROLOGICAL: No numbness or weakness  All other review of systems is negative unless indicated above      PHYSICAL EXAM:    GENERAL: NAD, well-groomed, well-developed  EYES: EOMI, PERRLA,   NECK: Supple, No JVD; Normal thyroid; Trachea midline  NERVOUS SYSTEM:  Alert & Oriented X3,  Motor Strength 5/5 B/L upper and lower extremities; DTRs 2+ intact and symmetric  CHEST/LUNG: No rales, rhonchi, wheezing   HEART: Regular rate and rhythm; No murmurs,   ABDOMEN: Soft, Nontender, Nondistended; Bowel sounds present  EXTREMITIES:  2+ Peripheral Pulses, No clubbing, cyanosis, or edema        LABS:  CBC Full  -  ( 06 Jul 2021 04:33 )  WBC Count : 12.02 K/uL  RBC Count : 4.45 M/uL  Hemoglobin : 12.6 g/dL  Hematocrit : 43.7 %  Platelet Count - Automated : 219 K/uL  Mean Cell Volume : 98.2 fl  Mean Cell Hemoglobin : 28.3 pg  Mean Cell Hemoglobin Concentration : 28.8 gm/dL  Auto Neutrophil # : 10.49 K/uL  Auto Lymphocyte # : 0.77 K/uL  Auto Monocyte # : 0.51 K/uL  Auto Eosinophil # : 0.10 K/uL  Auto Basophil # : 0.02 K/uL  Auto Neutrophil % : 87.3 %  Auto Lymphocyte % : 6.4 %  Auto Monocyte % : 4.2 %  Auto Eosinophil % : 0.8 %  Auto Basophil % : 0.2 %    07-06    137  |  91<L>  |  23<H>  ----------------------------<  228<H>  5.2   |  45<HH>  |  0.52    Ca    8.9      06 Jul 2021 04:33  Phos  3.9     07-06  Mg     2.4     07-06    TPro  6.8  /  Alb  3.1<L>  /  TBili  0.2  /  DBili  x   /  AST  15  /  ALT  40  /  AlkPhos  67  07-05            RADIOLOGY & ADDITIONAL STUDIES REVIEWED:  ***    [ ]GOALS OF CARE DISCUSSION WITH PATIENT/FAMILY/PROXY:    CRITICAL CARE TIME SPENT: 35 minutes INTERVAL HPI/OVERNIGHT EVENTS: Patient uncooperative with bipap. Acidodic and retained c02 in am, placed on bipap and repeat abgs improved    PRESSORS: [ ] YES [x ] NO  WHICH:      Antimicrobial:    Cardiovascular:    Pulmonary:  albuterol 90 micrograms(s) HFA Inhaler - 2 Puff(s) Inhalation every 6 hours    Hematalogic:  aspirin  chewable 81 milliGRAM(s) Oral daily  enoxaparin Injectable 40 milliGRAM(s) SubCutaneous daily    Other:  ALPRAZolam 0.5 milliGRAM(s) Oral at bedtime  dexMEDEtomidine Infusion 0.2 MICROgram(s)/kG/Hr IV Continuous <Continuous>  insulin lispro (ADMELOG) corrective regimen sliding scale   SubCutaneous every 6 hours  insulin lispro (ADMELOG) corrective regimen sliding scale   SubCutaneous at bedtime  melatonin 3 milliGRAM(s) Oral at bedtime  methylPREDNISolone sodium succinate Injectable 40 milliGRAM(s) IV Push every 6 hours  multivitamin/minerals 1 Tablet(s) Oral daily  nicotine -   7 mG/24Hr(s) Patch 1 patch Transdermal daily  OLANZapine Injectable 5 milliGRAM(s) IntraMuscular every 12 hours  oxycodone    5 mG/acetaminophen 325 mG 2 Tablet(s) Oral every 6 hours PRN  pantoprazole    Tablet 40 milliGRAM(s) Oral before breakfast  polyethylene glycol 3350 17 Gram(s) Oral daily  risperiDONE   Tablet 1 milliGRAM(s) Oral daily  simethicone 80 milliGRAM(s) Chew every 6 hours  traZODone 150 milliGRAM(s) Oral at bedtime    albuterol/ipratropium for Nebulization 3 milliLiter(s) Nebulizer every 4 hours  ALPRAZolam 0.5 milliGRAM(s) Oral at bedtime  aspirin  chewable 81 milliGRAM(s) Oral daily  dexMEDEtomidine Infusion 0.2 MICROgram(s)/kG/Hr IV Continuous <Continuous>  enoxaparin Injectable 40 milliGRAM(s) SubCutaneous daily  insulin lispro (ADMELOG) corrective regimen sliding scale   SubCutaneous every 6 hours  insulin lispro (ADMELOG) corrective regimen sliding scale   SubCutaneous at bedtime  melatonin 3 milliGRAM(s) Oral at bedtime  methylPREDNISolone sodium succinate Injectable 40 milliGRAM(s) IV Push every 6 hours  multivitamin/minerals 1 Tablet(s) Oral daily  nicotine -   7 mG/24Hr(s) Patch 1 patch Transdermal daily  OLANZapine Injectable 5 milliGRAM(s) IntraMuscular every 12 hours  oxycodone    5 mG/acetaminophen 325 mG 2 Tablet(s) Oral every 6 hours PRN  pantoprazole    Tablet 40 milliGRAM(s) Oral before breakfast  polyethylene glycol 3350 17 Gram(s) Oral daily  risperiDONE   Tablet 1 milliGRAM(s) Oral daily  simethicone 80 milliGRAM(s) Chew every 6 hours  traZODone 150 milliGRAM(s) Oral at bedtime    Drug Dosing Weight  Height (cm): 172.7 (05 Jul 2021 09:15)  Weight (kg): 94.3 (05 Jul 2021 09:15)  BMI (kg/m2): 31.6 (05 Jul 2021 09:15)  BSA (m2): 2.08 (05 Jul 2021 09:15)    CENTRAL LINE: [ ] YES [x ] NO  LOCATION:         AMOR: [x ] YES [ ] NO          A-LINE:  [ ] YES [x ] NO  LOCATION:             ICU Vital Signs Last 24 Hrs  T(C): 36.4 (06 Jul 2021 07:00), Max: 36.4 (06 Jul 2021 07:00)  T(F): 97.5 (06 Jul 2021 07:00), Max: 97.5 (06 Jul 2021 07:00)  HR: 122 (06 Jul 2021 14:34) (93 - 122)  BP: 109/78 (06 Jul 2021 14:00) (92/35 - 131/62)  BP(mean): 82 (06 Jul 2021 14:00) (48 - 92)  ABP: --  ABP(mean): --  RR: 23 (06 Jul 2021 14:00) (15 - 39)  SpO2: 93% (06 Jul 2021 14:34) (68% - 99%)      ABG - ( 06 Jul 2021 11:53 )  pH, Arterial: 7.39  pH, Blood: x     /  pCO2: 90    /  pO2: 74    / HCO3: 54    / Base Excess: 23.8  /  SaO2: 94                    07-05 @ 07:01  -  07-06 @ 07:00  --------------------------------------------------------  IN: 550 mL / OUT: 1535 mL / NET: -985 mL            REVIEW OF SYSTEMS:    CONSTITUTIONAL: No weakness, fevers or chills  RESPIRATORY: shortness of breath  CARDIOVASCULAR: No chest pain or palpitations  GASTROINTESTINAL: No abdominal or epigastric pain. No nausea, vomiting, No diarrhea or constipation.   GENITOURINARY: No dysuria, frequency or hematuria  NEUROLOGICAL: No numbness or weakness  All other review of systems is negative unless indicated above      PHYSICAL EXAM:    GENERAL: patient laying in bed, on HFO  EYES: EOMI, PERRLA,   NECK: Supple, No JVD; Normal thyroid; Trachea midline  NERVOUS SYSTEM:  Alert & Oriented X3,  Motor Strength 5/5 B/L upper and lower extremities;  CHEST/LUNG: bilateral chest rise, breath sounds not appreciated well   HEART: Regular rate and rhythm; No murmurs,   ABDOMEN: Soft, Nontender, Nondistended; Bowel sounds present  EXTREMITIES:  No clubbing, cyanosis, or edema        LABS:  CBC Full  -  ( 06 Jul 2021 04:33 )  WBC Count : 12.02 K/uL  RBC Count : 4.45 M/uL  Hemoglobin : 12.6 g/dL  Hematocrit : 43.7 %  Platelet Count - Automated : 219 K/uL  Mean Cell Volume : 98.2 fl  Mean Cell Hemoglobin : 28.3 pg  Mean Cell Hemoglobin Concentration : 28.8 gm/dL  Auto Neutrophil # : 10.49 K/uL  Auto Lymphocyte # : 0.77 K/uL  Auto Monocyte # : 0.51 K/uL  Auto Eosinophil # : 0.10 K/uL  Auto Basophil # : 0.02 K/uL  Auto Neutrophil % : 87.3 %  Auto Lymphocyte % : 6.4 %  Auto Monocyte % : 4.2 %  Auto Eosinophil % : 0.8 %  Auto Basophil % : 0.2 %    07-06    137  |  91<L>  |  23<H>  ----------------------------<  228<H>  5.2   |  45<HH>  |  0.52    Ca    8.9      06 Jul 2021 04:33  Phos  3.9     07-06  Mg     2.4     07-06    TPro  6.8  /  Alb  3.1<L>  /  TBili  0.2  /  DBili  x   /  AST  15  /  ALT  40  /  AlkPhos  67  07-05    ABG - ( )  7/6 4am   pH 7.26, Arterial: 7.26  pH, Blood: arterial     /  pCO2: 122    /  pO2: 89    / HCO3: 55    / Base Excess: 23.8  /  SaO2: 94      7/6 6:30 am   pH 7.35, Arterial: 7.35  pH, Blood: arterial     /  pCO2: 100    /  pO2: 58    / HCO3: 55    / Base Excess: 23.8  /  SaO2: 94              RADIOLOGY & ADDITIONAL STUDIES REVIEWED:  < from: Transthoracic Echocardiogram (07.06.21 @ 07:20) >  CONCLUSIONS:  TECHNICALLY VERY DIFFICULT STUDY, POOR ACOUSTIC WINDOWS    1. Trace mitral regurgitation.  2. Normal left ventricular internaldimensions and wall  thicknesses.  3. Endocardium not well visualized; grossly normal left  ventricular systolic function based on limited views.  Segmental wall motion could not be assessed.  4. Right ventricular enlargement with normal RV systolic  function.  5. RV systolic pressure is 59 mm Hg. Moderate pulmonary  hypertension.    < end of copied text >      [ ]GOALS OF CARE DISCUSSION WITH PATIENT/FAMILY/PROXY:    CRITICAL CARE TIME SPENT: 35 minutes INTERVAL HPI/OVERNIGHT EVENTS: Patient uncooperative with bipap. Acidodic and c02 retention in am, placed on bipap and repeat abgs improved    PRESSORS: [ ] YES [x ] NO  WHICH:      Antimicrobial:    Cardiovascular:    Pulmonary:  albuterol 90 micrograms(s) HFA Inhaler - 2 Puff(s) Inhalation every 6 hours    Hematalogic:  aspirin  chewable 81 milliGRAM(s) Oral daily  enoxaparin Injectable 40 milliGRAM(s) SubCutaneous daily    Other:  ALPRAZolam 0.5 milliGRAM(s) Oral at bedtime  dexMEDEtomidine Infusion 0.2 MICROgram(s)/kG/Hr IV Continuous <Continuous>  insulin lispro (ADMELOG) corrective regimen sliding scale   SubCutaneous every 6 hours  insulin lispro (ADMELOG) corrective regimen sliding scale   SubCutaneous at bedtime  melatonin 3 milliGRAM(s) Oral at bedtime  methylPREDNISolone sodium succinate Injectable 40 milliGRAM(s) IV Push every 6 hours  multivitamin/minerals 1 Tablet(s) Oral daily  nicotine -   7 mG/24Hr(s) Patch 1 patch Transdermal daily  OLANZapine Injectable 5 milliGRAM(s) IntraMuscular every 12 hours  oxycodone    5 mG/acetaminophen 325 mG 2 Tablet(s) Oral every 6 hours PRN  pantoprazole    Tablet 40 milliGRAM(s) Oral before breakfast  polyethylene glycol 3350 17 Gram(s) Oral daily  risperiDONE   Tablet 1 milliGRAM(s) Oral daily  simethicone 80 milliGRAM(s) Chew every 6 hours  traZODone 150 milliGRAM(s) Oral at bedtime    albuterol/ipratropium for Nebulization 3 milliLiter(s) Nebulizer every 4 hours  ALPRAZolam 0.5 milliGRAM(s) Oral at bedtime  aspirin  chewable 81 milliGRAM(s) Oral daily  dexMEDEtomidine Infusion 0.2 MICROgram(s)/kG/Hr IV Continuous <Continuous>  enoxaparin Injectable 40 milliGRAM(s) SubCutaneous daily  insulin lispro (ADMELOG) corrective regimen sliding scale   SubCutaneous every 6 hours  insulin lispro (ADMELOG) corrective regimen sliding scale   SubCutaneous at bedtime  melatonin 3 milliGRAM(s) Oral at bedtime  methylPREDNISolone sodium succinate Injectable 40 milliGRAM(s) IV Push every 6 hours  multivitamin/minerals 1 Tablet(s) Oral daily  nicotine -   7 mG/24Hr(s) Patch 1 patch Transdermal daily  OLANZapine Injectable 5 milliGRAM(s) IntraMuscular every 12 hours  oxycodone    5 mG/acetaminophen 325 mG 2 Tablet(s) Oral every 6 hours PRN  pantoprazole    Tablet 40 milliGRAM(s) Oral before breakfast  polyethylene glycol 3350 17 Gram(s) Oral daily  risperiDONE   Tablet 1 milliGRAM(s) Oral daily  simethicone 80 milliGRAM(s) Chew every 6 hours  traZODone 150 milliGRAM(s) Oral at bedtime    Drug Dosing Weight  Height (cm): 172.7 (05 Jul 2021 09:15)  Weight (kg): 94.3 (05 Jul 2021 09:15)  BMI (kg/m2): 31.6 (05 Jul 2021 09:15)  BSA (m2): 2.08 (05 Jul 2021 09:15)    CENTRAL LINE: [ ] YES [x ] NO  LOCATION:         AMOR: [x ] YES [ ] NO          A-LINE:  [ ] YES [x ] NO  LOCATION:             ICU Vital Signs Last 24 Hrs  T(C): 36.4 (06 Jul 2021 07:00), Max: 36.4 (06 Jul 2021 07:00)  T(F): 97.5 (06 Jul 2021 07:00), Max: 97.5 (06 Jul 2021 07:00)  HR: 122 (06 Jul 2021 14:34) (93 - 122)  BP: 109/78 (06 Jul 2021 14:00) (92/35 - 131/62)  BP(mean): 82 (06 Jul 2021 14:00) (48 - 92)  ABP: --  ABP(mean): --  RR: 23 (06 Jul 2021 14:00) (15 - 39)  SpO2: 93% (06 Jul 2021 14:34) (68% - 99%)      ABG - ( 06 Jul 2021 11:53 )  pH, Arterial: 7.39  pH, Blood: x     /  pCO2: 90    /  pO2: 74    / HCO3: 54    / Base Excess: 23.8  /  SaO2: 94                    07-05 @ 07:01  -  07-06 @ 07:00  --------------------------------------------------------  IN: 550 mL / OUT: 1535 mL / NET: -985 mL            REVIEW OF SYSTEMS:    CONSTITUTIONAL: No weakness, fevers or chills  RESPIRATORY: shortness of breath  CARDIOVASCULAR: No chest pain or palpitations  GASTROINTESTINAL: No abdominal or epigastric pain.  No diarrhea or constipation.   GENITOURINARY: No dysuria, frequency or hematuria  NEUROLOGICAL: No numbness or weakness  All other review of systems is negative unless indicated above      PHYSICAL EXAM:    GENERAL: patient laying in bed, on HFO  EYES: EOMI, PERRLA,   NECK: Supple, No JVD; Normal thyroid; Trachea midline  NERVOUS SYSTEM:  Alert & Oriented X3,  Motor Strength 5/5 B/L upper and lower extremities;  CHEST/LUNG: bilateral chest rise, breath sounds not appreciated well   HEART: Regular rate and rhythm; No murmurs,   ABDOMEN: Soft, Nontender, Nondistended; Bowel sounds present  EXTREMITIES:  No clubbing, cyanosis, or edema        LABS:  CBC Full  -  ( 06 Jul 2021 04:33 )  WBC Count : 12.02 K/uL  RBC Count : 4.45 M/uL  Hemoglobin : 12.6 g/dL  Hematocrit : 43.7 %  Platelet Count - Automated : 219 K/uL  Mean Cell Volume : 98.2 fl  Mean Cell Hemoglobin : 28.3 pg  Mean Cell Hemoglobin Concentration : 28.8 gm/dL  Auto Neutrophil # : 10.49 K/uL  Auto Lymphocyte # : 0.77 K/uL  Auto Monocyte # : 0.51 K/uL  Auto Eosinophil # : 0.10 K/uL  Auto Basophil # : 0.02 K/uL  Auto Neutrophil % : 87.3 %  Auto Lymphocyte % : 6.4 %  Auto Monocyte % : 4.2 %  Auto Eosinophil % : 0.8 %  Auto Basophil % : 0.2 %    07-06    137  |  91<L>  |  23<H>  ----------------------------<  228<H>  5.2   |  45<HH>  |  0.52    Ca    8.9      06 Jul 2021 04:33  Phos  3.9     07-06  Mg     2.4     07-06    TPro  6.8  /  Alb  3.1<L>  /  TBili  0.2  /  DBili  x   /  AST  15  /  ALT  40  /  AlkPhos  67  07-05    ABG - ( )  7/6 4am   pH 7.26, Arterial: 7.26  pH, Blood: arterial     /  pCO2: 122    /  pO2: 89    / HCO3: 55    / Base Excess: 23.8  /  SaO2: 94      7/6 6:30 am   pH 7.35, Arterial: 7.35  pH, Blood: arterial     /  pCO2: 100    /  pO2: 58    / HCO3: 55    / Base Excess: 23.8  /  SaO2: 94              RADIOLOGY & ADDITIONAL STUDIES REVIEWED:  < from: Transthoracic Echocardiogram (07.06.21 @ 07:20) >  CONCLUSIONS:  TECHNICALLY VERY DIFFICULT STUDY, POOR ACOUSTIC WINDOWS    1. Trace mitral regurgitation.  2. Normal left ventricular internaldimensions and wall  thicknesses.  3. Endocardium not well visualized; grossly normal left  ventricular systolic function based on limited views.  Segmental wall motion could not be assessed.  4. Right ventricular enlargement with normal RV systolic  function.  5. RV systolic pressure is 59 mm Hg. Moderate pulmonary  hypertension.    < end of copied text >      [ ]GOALS OF CARE DISCUSSION WITH PATIENT/FAMILY/PROXY:    CRITICAL CARE TIME SPENT: 35 minutes

## 2021-07-07 LAB
ALBUMIN SERPL ELPH-MCNC: 3.2 G/DL — LOW (ref 3.5–5)
ALP SERPL-CCNC: 56 U/L — SIGNIFICANT CHANGE UP (ref 40–120)
ALT FLD-CCNC: 30 U/L DA — SIGNIFICANT CHANGE UP (ref 10–60)
ANION GAP SERPL CALC-SCNC: <-2 MMOL/L — LOW (ref 5–17)
AST SERPL-CCNC: 12 U/L — SIGNIFICANT CHANGE UP (ref 10–40)
BASE EXCESS BLDA CALC-SCNC: 22.3 MMOL/L — HIGH (ref -2–3)
BASOPHILS # BLD AUTO: 0.01 K/UL — SIGNIFICANT CHANGE UP (ref 0–0.2)
BASOPHILS NFR BLD AUTO: 0.1 % — SIGNIFICANT CHANGE UP (ref 0–2)
BILIRUB SERPL-MCNC: 0.2 MG/DL — SIGNIFICANT CHANGE UP (ref 0.2–1.2)
BUN SERPL-MCNC: 24 MG/DL — HIGH (ref 7–18)
CALCIUM SERPL-MCNC: 8.8 MG/DL — SIGNIFICANT CHANGE UP (ref 8.4–10.5)
CHLORIDE SERPL-SCNC: 96 MMOL/L — SIGNIFICANT CHANGE UP (ref 96–108)
CO2 SERPL-SCNC: >45 MMOL/L — CRITICAL HIGH (ref 22–31)
CREAT SERPL-MCNC: 0.57 MG/DL — SIGNIFICANT CHANGE UP (ref 0.5–1.3)
EOSINOPHIL # BLD AUTO: 0 K/UL — SIGNIFICANT CHANGE UP (ref 0–0.5)
EOSINOPHIL NFR BLD AUTO: 0 % — SIGNIFICANT CHANGE UP (ref 0–6)
GLUCOSE SERPL-MCNC: 198 MG/DL — HIGH (ref 70–99)
HCO3 BLDA-SCNC: 54 MMOL/L — CRITICAL HIGH (ref 21–28)
HCT VFR BLD CALC: 42.3 % — SIGNIFICANT CHANGE UP (ref 39–50)
HGB BLD-MCNC: 12.5 G/DL — LOW (ref 13–17)
HOROWITZ INDEX BLDA+IHG-RTO: 50 — SIGNIFICANT CHANGE UP
IMM GRANULOCYTES NFR BLD AUTO: 0.7 % — SIGNIFICANT CHANGE UP (ref 0–1.5)
LYMPHOCYTES # BLD AUTO: 0.6 K/UL — LOW (ref 1–3.3)
LYMPHOCYTES # BLD AUTO: 7.1 % — LOW (ref 13–44)
MAGNESIUM SERPL-MCNC: 2.5 MG/DL — SIGNIFICANT CHANGE UP (ref 1.6–2.6)
MCHC RBC-ENTMCNC: 28.4 PG — SIGNIFICANT CHANGE UP (ref 27–34)
MCHC RBC-ENTMCNC: 29.6 GM/DL — LOW (ref 32–36)
MCV RBC AUTO: 96.1 FL — SIGNIFICANT CHANGE UP (ref 80–100)
MONOCYTES # BLD AUTO: 0.47 K/UL — SIGNIFICANT CHANGE UP (ref 0–0.9)
MONOCYTES NFR BLD AUTO: 5.6 % — SIGNIFICANT CHANGE UP (ref 2–14)
NEUTROPHILS # BLD AUTO: 7.31 K/UL — SIGNIFICANT CHANGE UP (ref 1.8–7.4)
NEUTROPHILS NFR BLD AUTO: 86.5 % — HIGH (ref 43–77)
NRBC # BLD: 0 /100 WBCS — SIGNIFICANT CHANGE UP (ref 0–0)
PCO2 BLDA: 103 MMHG — CRITICAL HIGH (ref 35–48)
PH BLDA: 7.33 — LOW (ref 7.35–7.45)
PHOSPHATE SERPL-MCNC: 4.6 MG/DL — HIGH (ref 2.5–4.5)
PLATELET # BLD AUTO: 191 K/UL — SIGNIFICANT CHANGE UP (ref 150–400)
PO2 BLDA: 147 MMHG — HIGH (ref 83–108)
POTASSIUM SERPL-MCNC: 5.2 MMOL/L — SIGNIFICANT CHANGE UP (ref 3.5–5.3)
POTASSIUM SERPL-SCNC: 5.2 MMOL/L — SIGNIFICANT CHANGE UP (ref 3.5–5.3)
PROT SERPL-MCNC: 6.3 G/DL — SIGNIFICANT CHANGE UP (ref 6–8.3)
RBC # BLD: 4.4 M/UL — SIGNIFICANT CHANGE UP (ref 4.2–5.8)
RBC # FLD: 12.6 % — SIGNIFICANT CHANGE UP (ref 10.3–14.5)
SAO2 % BLDA: 98 % — SIGNIFICANT CHANGE UP
SODIUM SERPL-SCNC: 139 MMOL/L — SIGNIFICANT CHANGE UP (ref 135–145)
WBC # BLD: 8.45 K/UL — SIGNIFICANT CHANGE UP (ref 3.8–10.5)
WBC # FLD AUTO: 8.45 K/UL — SIGNIFICANT CHANGE UP (ref 3.8–10.5)

## 2021-07-07 PROCEDURE — 71045 X-RAY EXAM CHEST 1 VIEW: CPT | Mod: 26

## 2021-07-07 RX ORDER — CHLORHEXIDINE GLUCONATE 213 G/1000ML
1 SOLUTION TOPICAL
Refills: 0 | Status: DISCONTINUED | OUTPATIENT
Start: 2021-07-07 | End: 2021-07-15

## 2021-07-07 RX ORDER — AZITHROMYCIN 500 MG/1
500 TABLET, FILM COATED ORAL ONCE
Refills: 0 | Status: COMPLETED | OUTPATIENT
Start: 2021-07-07 | End: 2021-07-07

## 2021-07-07 RX ORDER — IPRATROPIUM/ALBUTEROL SULFATE 18-103MCG
3 AEROSOL WITH ADAPTER (GRAM) INHALATION EVERY 6 HOURS
Refills: 0 | Status: DISCONTINUED | OUTPATIENT
Start: 2021-07-07 | End: 2021-07-07

## 2021-07-07 RX ORDER — IPRATROPIUM/ALBUTEROL SULFATE 18-103MCG
3 AEROSOL WITH ADAPTER (GRAM) INHALATION EVERY 4 HOURS
Refills: 0 | Status: DISCONTINUED | OUTPATIENT
Start: 2021-07-07 | End: 2021-07-12

## 2021-07-07 RX ORDER — AZITHROMYCIN 500 MG/1
TABLET, FILM COATED ORAL
Refills: 0 | Status: DISCONTINUED | OUTPATIENT
Start: 2021-07-07 | End: 2021-07-09

## 2021-07-07 RX ORDER — PANTOPRAZOLE SODIUM 20 MG/1
40 TABLET, DELAYED RELEASE ORAL DAILY
Refills: 0 | Status: DISCONTINUED | OUTPATIENT
Start: 2021-07-07 | End: 2021-07-08

## 2021-07-07 RX ORDER — MORPHINE SULFATE 50 MG/1
1 CAPSULE, EXTENDED RELEASE ORAL EVERY 12 HOURS
Refills: 0 | Status: DISCONTINUED | OUTPATIENT
Start: 2021-07-07 | End: 2021-07-09

## 2021-07-07 RX ORDER — ACETAZOLAMIDE 250 MG/1
250 TABLET ORAL EVERY 8 HOURS
Refills: 0 | Status: COMPLETED | OUTPATIENT
Start: 2021-07-07 | End: 2021-07-08

## 2021-07-07 RX ORDER — AZITHROMYCIN 500 MG/1
500 TABLET, FILM COATED ORAL EVERY 24 HOURS
Refills: 0 | Status: DISCONTINUED | OUTPATIENT
Start: 2021-07-08 | End: 2021-07-09

## 2021-07-07 RX ADMIN — MORPHINE SULFATE 1 MILLIGRAM(S): 50 CAPSULE, EXTENDED RELEASE ORAL at 11:30

## 2021-07-07 RX ADMIN — Medication 1 PATCH: at 08:15

## 2021-07-07 RX ADMIN — OLANZAPINE 5 MILLIGRAM(S): 15 TABLET, FILM COATED ORAL at 05:02

## 2021-07-07 RX ADMIN — Medication 81 MILLIGRAM(S): at 11:32

## 2021-07-07 RX ADMIN — Medication 40 MILLIGRAM(S): at 00:10

## 2021-07-07 RX ADMIN — Medication 2: at 11:54

## 2021-07-07 RX ADMIN — Medication 1 PATCH: at 11:36

## 2021-07-07 RX ADMIN — Medication 40 MILLIGRAM(S): at 05:02

## 2021-07-07 RX ADMIN — ACETAZOLAMIDE 105 MILLIGRAM(S): 250 TABLET ORAL at 16:43

## 2021-07-07 RX ADMIN — Medication 3 MILLILITER(S): at 20:29

## 2021-07-07 RX ADMIN — AZITHROMYCIN 255 MILLIGRAM(S): 500 TABLET, FILM COATED ORAL at 11:54

## 2021-07-07 RX ADMIN — Medication 40 MILLIGRAM(S): at 23:17

## 2021-07-07 RX ADMIN — CHLORHEXIDINE GLUCONATE 1 APPLICATION(S): 213 SOLUTION TOPICAL at 11:33

## 2021-07-07 RX ADMIN — OLANZAPINE 5 MILLIGRAM(S): 15 TABLET, FILM COATED ORAL at 17:32

## 2021-07-07 RX ADMIN — DEXMEDETOMIDINE HYDROCHLORIDE IN 0.9% SODIUM CHLORIDE 4.72 MICROGRAM(S)/KG/HR: 4 INJECTION INTRAVENOUS at 16:44

## 2021-07-07 RX ADMIN — ACETAZOLAMIDE 105 MILLIGRAM(S): 250 TABLET ORAL at 21:30

## 2021-07-07 RX ADMIN — MORPHINE SULFATE 1 MILLIGRAM(S): 50 CAPSULE, EXTENDED RELEASE ORAL at 11:55

## 2021-07-07 RX ADMIN — RISPERIDONE 1 MILLIGRAM(S): 4 TABLET ORAL at 11:33

## 2021-07-07 RX ADMIN — Medication 150 MILLIGRAM(S): at 21:02

## 2021-07-07 RX ADMIN — DEXMEDETOMIDINE HYDROCHLORIDE IN 0.9% SODIUM CHLORIDE 4.72 MICROGRAM(S)/KG/HR: 4 INJECTION INTRAVENOUS at 03:51

## 2021-07-07 RX ADMIN — Medication 2: at 05:31

## 2021-07-07 RX ADMIN — ENOXAPARIN SODIUM 40 MILLIGRAM(S): 100 INJECTION SUBCUTANEOUS at 11:32

## 2021-07-07 RX ADMIN — Medication 1 TABLET(S): at 11:33

## 2021-07-07 RX ADMIN — Medication 3 MILLILITER(S): at 06:21

## 2021-07-07 RX ADMIN — Medication 3 MILLIGRAM(S): at 21:02

## 2021-07-07 RX ADMIN — Medication 3 MILLILITER(S): at 17:22

## 2021-07-07 RX ADMIN — PANTOPRAZOLE SODIUM 40 MILLIGRAM(S): 20 TABLET, DELAYED RELEASE ORAL at 11:32

## 2021-07-07 RX ADMIN — DEXMEDETOMIDINE HYDROCHLORIDE IN 0.9% SODIUM CHLORIDE 4.72 MICROGRAM(S)/KG/HR: 4 INJECTION INTRAVENOUS at 23:59

## 2021-07-07 RX ADMIN — Medication 40 MILLIGRAM(S): at 17:33

## 2021-07-07 RX ADMIN — DEXMEDETOMIDINE HYDROCHLORIDE IN 0.9% SODIUM CHLORIDE 4.72 MICROGRAM(S)/KG/HR: 4 INJECTION INTRAVENOUS at 11:32

## 2021-07-07 RX ADMIN — Medication 3 MILLILITER(S): at 14:06

## 2021-07-07 RX ADMIN — Medication 2: at 17:39

## 2021-07-07 RX ADMIN — Medication 40 MILLIGRAM(S): at 11:33

## 2021-07-07 RX ADMIN — Medication 3: at 23:16

## 2021-07-07 RX ADMIN — Medication 1 PATCH: at 20:12
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
## 2021-07-07 NOTE — PROGRESS NOTE ADULT - SUBJECTIVE AND OBJECTIVE BOX
INTERVAL HPI/OVERNIGHT EVENTS: ***    PRESSORS: [ ] YES [ ] NO  WHICH:      Antimicrobial:  azithromycin  IVPB        Cardiovascular:  acetaZOLAMIDE  IVPB 250 milliGRAM(s) IV Intermittent every 8 hours    Pulmonary:  albuterol/ipratropium for Nebulization 3 milliLiter(s) Nebulizer every 4 hours    Hematalogic:  aspirin  chewable 81 milliGRAM(s) Oral daily  enoxaparin Injectable 40 milliGRAM(s) SubCutaneous daily    Other:  chlorhexidine 2% Cloths 1 Application(s) Topical <User Schedule>  dexMEDEtomidine Infusion 0.2 MICROgram(s)/kG/Hr IV Continuous <Continuous>  insulin lispro (ADMELOG) corrective regimen sliding scale   SubCutaneous every 6 hours  insulin lispro (ADMELOG) corrective regimen sliding scale   SubCutaneous at bedtime  melatonin 3 milliGRAM(s) Oral at bedtime  methylPREDNISolone sodium succinate Injectable 40 milliGRAM(s) IV Push every 6 hours  morphine  - Injectable 1 milliGRAM(s) IV Push every 12 hours PRN  multivitamin/minerals 1 Tablet(s) Oral daily  nicotine -   7 mG/24Hr(s) Patch 1 patch Transdermal daily  OLANZapine Injectable 5 milliGRAM(s) IntraMuscular every 12 hours  oxycodone    5 mG/acetaminophen 325 mG 2 Tablet(s) Oral every 6 hours PRN  pantoprazole  Injectable 40 milliGRAM(s) IV Push daily  polyethylene glycol 3350 17 Gram(s) Oral daily  risperiDONE   Tablet 1 milliGRAM(s) Oral daily  simethicone 80 milliGRAM(s) Chew every 6 hours  traZODone 150 milliGRAM(s) Oral at bedtime    acetaZOLAMIDE  IVPB 250 milliGRAM(s) IV Intermittent every 8 hours  albuterol/ipratropium for Nebulization 3 milliLiter(s) Nebulizer every 4 hours  aspirin  chewable 81 milliGRAM(s) Oral daily  azithromycin  IVPB      chlorhexidine 2% Cloths 1 Application(s) Topical <User Schedule>  dexMEDEtomidine Infusion 0.2 MICROgram(s)/kG/Hr IV Continuous <Continuous>  enoxaparin Injectable 40 milliGRAM(s) SubCutaneous daily  insulin lispro (ADMELOG) corrective regimen sliding scale   SubCutaneous every 6 hours  insulin lispro (ADMELOG) corrective regimen sliding scale   SubCutaneous at bedtime  melatonin 3 milliGRAM(s) Oral at bedtime  methylPREDNISolone sodium succinate Injectable 40 milliGRAM(s) IV Push every 6 hours  morphine  - Injectable 1 milliGRAM(s) IV Push every 12 hours PRN  multivitamin/minerals 1 Tablet(s) Oral daily  nicotine -   7 mG/24Hr(s) Patch 1 patch Transdermal daily  OLANZapine Injectable 5 milliGRAM(s) IntraMuscular every 12 hours  oxycodone    5 mG/acetaminophen 325 mG 2 Tablet(s) Oral every 6 hours PRN  pantoprazole  Injectable 40 milliGRAM(s) IV Push daily  polyethylene glycol 3350 17 Gram(s) Oral daily  risperiDONE   Tablet 1 milliGRAM(s) Oral daily  simethicone 80 milliGRAM(s) Chew every 6 hours  traZODone 150 milliGRAM(s) Oral at bedtime    Drug Dosing Weight  Height (cm): 172.7 (05 Jul 2021 09:15)  Weight (kg): 94.3 (05 Jul 2021 09:15)  BMI (kg/m2): 31.6 (05 Jul 2021 09:15)  BSA (m2): 2.08 (05 Jul 2021 09:15)    CENTRAL LINE: [ ] YES [ ] NO  LOCATION:         AMOR: [ ] YES [ ] NO          A-LINE:  [ ] YES [ ] NO  LOCATION:             ICU Vital Signs Last 24 Hrs  T(C): 36.6 (07 Jul 2021 07:30), Max: 36.6 (07 Jul 2021 07:30)  T(F): 97.9 (07 Jul 2021 07:30), Max: 97.9 (07 Jul 2021 07:30)  HR: 73 (07 Jul 2021 12:11) (68 - 126)  BP: 125/76 (07 Jul 2021 11:00) (86/58 - 137/80)  BP(mean): 89 (07 Jul 2021 11:00) (65 - 95)  ABP: --  ABP(mean): --  RR: 29 (07 Jul 2021 11:00) (15 - 37)  SpO2: 94% (07 Jul 2021 12:11) (68% - 98%)      ABG - ( 07 Jul 2021 04:16 )  pH, Arterial: 7.33  pH, Blood: x     /  pCO2: 103   /  pO2: 147   / HCO3: 54    / Base Excess: 22.3  /  SaO2: 98                    07-06 @ 07:01  -  07-07 @ 07:00  --------------------------------------------------------  IN: 632.8 mL / OUT: 2230 mL / NET: -1597.2 mL            REVIEW OF SYSTEMS:    CONSTITUTIONAL: No weakness, fevers or chills  NECK: No pain or stiffness  RESPIRATORY: No cough, wheezing, hemoptysis; No shortness of breath  CARDIOVASCULAR: No chest pain or palpitations  GASTROINTESTINAL: No abdominal or epigastric pain. No nausea, vomiting, No diarrhea or constipation. No melena or hematochezia.  GENITOURINARY: No dysuria, frequency or hematuria  NEUROLOGICAL: No numbness or weakness  All other review of systems is negative unless indicated above      PHYSICAL EXAM:    GENERAL: NAD, well-groomed, well-developed  EYES: EOMI, PERRLA,   NECK: Supple, No JVD; Normal thyroid; Trachea midline  NERVOUS SYSTEM:  Alert & Oriented X3,  Motor Strength 5/5 B/L upper and lower extremities; DTRs 2+ intact and symmetric  CHEST/LUNG: No rales, rhonchi, wheezing   HEART: Regular rate and rhythm; No murmurs,   ABDOMEN: Soft, Nontender, Nondistended; Bowel sounds present  EXTREMITIES:  2+ Peripheral Pulses, No clubbing, cyanosis, or edema        LABS:  CBC Full  -  ( 07 Jul 2021 03:27 )  WBC Count : 8.45 K/uL  RBC Count : 4.40 M/uL  Hemoglobin : 12.5 g/dL  Hematocrit : 42.3 %  Platelet Count - Automated : 191 K/uL  Mean Cell Volume : 96.1 fl  Mean Cell Hemoglobin : 28.4 pg  Mean Cell Hemoglobin Concentration : 29.6 gm/dL  Auto Neutrophil # : 7.31 K/uL  Auto Lymphocyte # : 0.60 K/uL  Auto Monocyte # : 0.47 K/uL  Auto Eosinophil # : 0.00 K/uL  Auto Basophil # : 0.01 K/uL  Auto Neutrophil % : 86.5 %  Auto Lymphocyte % : 7.1 %  Auto Monocyte % : 5.6 %  Auto Eosinophil % : 0.0 %  Auto Basophil % : 0.1 %    07-07    139  |  96  |  24<H>  ----------------------------<  198<H>  5.2   |  >45<HH>  |  0.57    Ca    8.8      07 Jul 2021 03:27  Phos  4.6     07-07  Mg     2.5     07-07    TPro  6.3  /  Alb  3.2<L>  /  TBili  0.2  /  DBili  x   /  AST  12  /  ALT  30  /  AlkPhos  56  07-07            RADIOLOGY & ADDITIONAL STUDIES REVIEWED:  ***    [ ]GOALS OF CARE DISCUSSION WITH PATIENT/FAMILY/PROXY:    CRITICAL CARE TIME SPENT: 35 minutes INTERVAL HPI/OVERNIGHT EVENTS: Patient non-compliant in the evening. Received zyprexa and haldol + ativan which had minimal effect. Placed on 0.2 precedex which helped. On bibap throughout night.  PRESSORS: [ ] YES [x ] NO  WHICH:      Antimicrobial:  azithromycin  IVPB        Cardiovascular:  acetaZOLAMIDE  IVPB 250 milliGRAM(s) IV Intermittent every 8 hours    Pulmonary:  albuterol/ipratropium for Nebulization 3 milliLiter(s) Nebulizer every 4 hours    Hematalogic:  aspirin  chewable 81 milliGRAM(s) Oral daily  enoxaparin Injectable 40 milliGRAM(s) SubCutaneous daily    Other:  chlorhexidine 2% Cloths 1 Application(s) Topical <User Schedule>  dexMEDEtomidine Infusion 0.2 MICROgram(s)/kG/Hr IV Continuous <Continuous>  insulin lispro (ADMELOG) corrective regimen sliding scale   SubCutaneous every 6 hours  insulin lispro (ADMELOG) corrective regimen sliding scale   SubCutaneous at bedtime  melatonin 3 milliGRAM(s) Oral at bedtime  methylPREDNISolone sodium succinate Injectable 40 milliGRAM(s) IV Push every 6 hours  morphine  - Injectable 1 milliGRAM(s) IV Push every 12 hours PRN  multivitamin/minerals 1 Tablet(s) Oral daily  nicotine -   7 mG/24Hr(s) Patch 1 patch Transdermal daily  OLANZapine Injectable 5 milliGRAM(s) IntraMuscular every 12 hours  oxycodone    5 mG/acetaminophen 325 mG 2 Tablet(s) Oral every 6 hours PRN  pantoprazole  Injectable 40 milliGRAM(s) IV Push daily  polyethylene glycol 3350 17 Gram(s) Oral daily  risperiDONE   Tablet 1 milliGRAM(s) Oral daily  simethicone 80 milliGRAM(s) Chew every 6 hours  traZODone 150 milliGRAM(s) Oral at bedtime    acetaZOLAMIDE  IVPB 250 milliGRAM(s) IV Intermittent every 8 hours  albuterol/ipratropium for Nebulization 3 milliLiter(s) Nebulizer every 4 hours  aspirin  chewable 81 milliGRAM(s) Oral daily  azithromycin  IVPB      chlorhexidine 2% Cloths 1 Application(s) Topical <User Schedule>  dexMEDEtomidine Infusion 0.2 MICROgram(s)/kG/Hr IV Continuous <Continuous>  enoxaparin Injectable 40 milliGRAM(s) SubCutaneous daily  insulin lispro (ADMELOG) corrective regimen sliding scale   SubCutaneous every 6 hours  insulin lispro (ADMELOG) corrective regimen sliding scale   SubCutaneous at bedtime  melatonin 3 milliGRAM(s) Oral at bedtime  methylPREDNISolone sodium succinate Injectable 40 milliGRAM(s) IV Push every 6 hours  morphine  - Injectable 1 milliGRAM(s) IV Push every 12 hours PRN  multivitamin/minerals 1 Tablet(s) Oral daily  nicotine -   7 mG/24Hr(s) Patch 1 patch Transdermal daily  OLANZapine Injectable 5 milliGRAM(s) IntraMuscular every 12 hours  oxycodone    5 mG/acetaminophen 325 mG 2 Tablet(s) Oral every 6 hours PRN  pantoprazole  Injectable 40 milliGRAM(s) IV Push daily  polyethylene glycol 3350 17 Gram(s) Oral daily  risperiDONE   Tablet 1 milliGRAM(s) Oral daily  simethicone 80 milliGRAM(s) Chew every 6 hours  traZODone 150 milliGRAM(s) Oral at bedtime    Drug Dosing Weight  Height (cm): 172.7 (05 Jul 2021 09:15)  Weight (kg): 94.3 (05 Jul 2021 09:15)  BMI (kg/m2): 31.6 (05 Jul 2021 09:15)  BSA (m2): 2.08 (05 Jul 2021 09:15)    CENTRAL LINE: [ ] YES [x ] NO  LOCATION:         AMOR: [x ] YES [ ] NO          A-LINE:  [ ] YES [ x] NO  LOCATION:             ICU Vital Signs Last 24 Hrs  T(C): 36.6 (07 Jul 2021 07:30), Max: 36.6 (07 Jul 2021 07:30)  T(F): 97.9 (07 Jul 2021 07:30), Max: 97.9 (07 Jul 2021 07:30)  HR: 73 (07 Jul 2021 12:11) (68 - 126)  BP: 125/76 (07 Jul 2021 11:00) (86/58 - 137/80)  BP(mean): 89 (07 Jul 2021 11:00) (65 - 95)  ABP: --  ABP(mean): --  RR: 29 (07 Jul 2021 11:00) (15 - 37)  SpO2: 94% (07 Jul 2021 12:11) (68% - 98%)      ABG - ( 07 Jul 2021 04:16 )  pH, Arterial: 7.33  pH, Blood: x     /  pCO2: 103   /  pO2: 147   / HCO3: 54    / Base Excess: 22.3  /  SaO2: 98                    07-06 @ 07:01  -  07-07 @ 07:00  --------------------------------------------------------  IN: 632.8 mL / OUT: 2230 mL / NET: -1597.2 mL          PHYSICAL EXAM:    GENERAL: NAD, patient sedated in bed, increased A-P diameter  NECK: Supple, No JVD; Normal thyroid; Trachea midline  NERVOUS SYSTEM:  Patient sedated,  CHEST/LUNG: No rales, rhonchi, wheezing   HEART: Distant heart sounds, regular rate and rhythm; No murmurs  ABDOMEN: Soft, Nondistended; Bowel sounds present  EXTREMITIES: No clubbing, cyanosis, or edema        LABS:  CBC Full  -  ( 07 Jul 2021 03:27 )  WBC Count : 8.45 K/uL  RBC Count : 4.40 M/uL  Hemoglobin : 12.5 g/dL  Hematocrit : 42.3 %  Platelet Count - Automated : 191 K/uL  Mean Cell Volume : 96.1 fl  Mean Cell Hemoglobin : 28.4 pg  Mean Cell Hemoglobin Concentration : 29.6 gm/dL  Auto Neutrophil # : 7.31 K/uL  Auto Lymphocyte # : 0.60 K/uL  Auto Monocyte # : 0.47 K/uL  Auto Eosinophil # : 0.00 K/uL  Auto Basophil # : 0.01 K/uL  Auto Neutrophil % : 86.5 %  Auto Lymphocyte % : 7.1 %  Auto Monocyte % : 5.6 %  Auto Eosinophil % : 0.0 %  Auto Basophil % : 0.1 %    07-07    139  |  96  |  24<H>  ----------------------------<  198<H>  5.2   |  >45<HH>  |  0.57    Ca    8.8      07 Jul 2021 03:27  Phos  4.6     07-07  Mg     2.5     07-07    TPro  6.3  /  Alb  3.2<L>  /  TBili  0.2  /  DBili  x   /  AST  12  /  ALT  30  /  AlkPhos  56  07-07            RADIOLOGY & ADDITIONAL STUDIES REVIEWED:  ***    [ ]GOALS OF CARE DISCUSSION WITH PATIENT/FAMILY/PROXY:    CRITICAL CARE TIME SPENT: 35 minutes

## 2021-07-07 NOTE — DIETITIAN INITIAL EVALUATION ADULT. - PROBLEM SELECTOR PLAN 1
- p/w with worsening SOB from last few days, no cough most likely from COPD EXACERBATION  - multifactorial in setting of COPD and CHF   - Likely 2/2 to cigarette use; advised cessation and provided nicotine replacement therapy  - PE: b/l wheeze present , Lower extremity +1 pitting edema  - Leukocytosis (likely due to chronic steroid use)  - pt is afebrile  Gasses (venous):PH: 7.2, PCO2: 96, HCO3: 46  - elevated bicarb consistent with hypercapnia compensation by kideys in setting of chronic co2 retaining  - will start solumedrol 40 mg q 6   -started albuterol 2 puff q 6  -started ipratropium  - no sings of infection  - no need for antibiotics  - s/p IV lasix 40 mg    - Supportive care and anti-tussives  - supplemental O2 if needed  - daily peak flow

## 2021-07-07 NOTE — DIETITIAN INITIAL EVALUATION ADULT. - ADD RECOMMEND
Monitor electrolytes for any need for control. If intake< 75% meals, consider PO supplement. MD to monitor. RD available.

## 2021-07-07 NOTE — PROGRESS NOTE ADULT - ASSESSMENT
60 YOM with a h/o COPD on 2L home O2, CAD with stent, CHF, DM, GERD, HLD, pHTN,  asthma, anxiety, and panic attacks was brought in by ambulette from his assisted living facility for shortness of breath was admitted for COPD exacerbation. He was found to be encephalopathic, had co2 retention. Patient is being transferred to ICU for BiPAP placement.    #Acute encephalopathy  #COPD exacerbation  #CHF  #CAD  #DM  #HTN    #Neuro:  Patient was found to altered mental status.  Since resolved  Maintain BiPAP and high flow o2  Patient agitated and placed on 1:1    #CVS:  Patient has h/o CHF, on lasix at home  Patient was found to have mild pleural effusion on R base  Lasix stopped  Does not look fluid overloaded, not in CHF exacerbation  Also has stented artery  echo details above: normal LV size and function, RV enlargement with normal function, RV systolic pressure 59 mm Hg, moderate P      Pulmonology:  Patient was found to have AMS, 2/2 to CO2 retention  has been having multiple exacerbations, >2 in past year, class D COPD   Patient noncompliant with BIPAP  7.35|100 |58|55  f/u repeat abg  d/c Symbicort  add duoneb  C/w methylprednisolone 40mg q6  Monitor respiratory status    GI:  No issues  NPO while on BIPAP    Endo:  Has h/o DM  Fq6 while NPO  c/w sliding scale    Renal:  Has respiratory acidosis with ph of 7.2  Since improved to ph 7.35  Will monitor and repeat ABG    Prophylactic:  On lovenox   on PPI    GOC:  Full code 60 YOM with a h/o COPD on 2L home O2, CAD with stent, CHF, DM, GERD, HLD, pHTN,  asthma, anxiety, and panic attacks was brought in by ambulette from his assisted living facility for shortness of breath was admitted for COPD exacerbation. He was found to be encephalopathic, had co2 retention. Patient is being transferred to ICU for BiPAP placement.    #Acute encephalopathy  #COPD exacerbation  #CHF  #CAD  #DM  #HTN    #Neuro:  Patient was found to altered mental status which resolved  Patient currently 7/7 sedated  Maintain BiPAP and high flow o2  Patient agitated and placed on 1:1    #CVS:  Patient has h/o CHF, on lasix at home  Patient was found to have mild pleural effusion on R base  Lasix stopped  Does not look fluid overloaded, not in CHF exacerbation  Also has stented artery  echo details above: normal LV size and function, RV enlargement with normal function, RV systolic pressure 59 mm Hg, moderate P      Pulmonology:  Patient was found to have AMS, 2/2 to CO2 retention  has been having multiple exacerbations, >2 in past year, class D COPD   Patient noncompliant with BIPAP  7.35|100 |58|55  f/u repeat abg  d/c Symbicort  add duoneb  C/w methylprednisolone 40mg q6  Monitor respiratory status    GI:  No issues  NPO while on BIPAP    Endo:  Has h/o DM  Fq6 while NPO  c/w sliding scale    Renal:  Has respiratory acidosis with ph of 7.2  Since improved to ph 7.35  Will monitor and repeat ABG    Prophylactic:  On lovenox   on PPI    GOC:  Full code 60 YOM with a h/o COPD on 2L home O2, CAD with stent, CHF, DM, GERD, HLD, pHTN,  asthma, anxiety, and panic attacks was brought in by ambulette from his assisted living facility for shortness of breath was admitted for COPD exacerbation. He was found to be encephalopathic, had co2 retention. Patient is being transferred to ICU for BiPAP placement.    #Acute encephalopathy  #COPD exacerbation  #CHF  #CAD  #DM  #HTN    #Neuro:  Patient was found to altered mental status which resolved  Patient currently 7/7 sedated  Maintain BiPAP and high flow o2  Patient agitated and placed on 1:1  d/c xanax    #CVS:  Patient has h/o CHF, on lasix at home  Patient was found to have mild pleural effusion on R base  Lasix stopped  Does not look fluid overloaded, not in CHF exacerbation  Also has stented artery  echo details above: normal LV size and function, RV enlargement with normal function, RV systolic pressure 59 mm Hg, moderate P      Pulmonology:  Patient was found to have AMS, 2/2 to CO2 retention  Patient noncompliant with BIPAP  7.33|103 |147| 54  azithromycin 500 x 3  repeat CXR  c/w duoneb q4  C/w methylprednisolone 40mg q6  f/u repeat abg  add morphin 1mg q12 for pain  Monitor respiratory status    GI:  No issues  NPO while on BIPAP    Endo:  Has h/o DM  Fq6 while NPO  c/w sliding scale    Renal:  had respiratory acidosis with ph of 7.2  Now ph 7.33  Will monitor and repeat ABG    Prophylactic:  On lovenox   on PPI    GOC:  Full code

## 2021-07-07 NOTE — DIETITIAN INITIAL EVALUATION ADULT. - PERTINENT LABORATORY DATA
07-07 Na139 mmol/L Glu 198 mg/dL<H> K+ 5.2 mmol/L Cr  0.57 mg/dL BUN 24 mg/dL<H>   07-07 Phos 4.6 mg/dL<H>   07-07 Alb 3.2 g/dL<L>     07-04 Chol 154 mg/dL LDL --    HDL 25 mg/dL<L> Trig 136 mg/dL    07-05-21 @ 09:39 HgbA1C 6.5  07-04-21 @ 21:42 HgbA1C 6.5      Vitamin D, 25-Hydroxy: 48.5 ng/mL (07-04-21 @ 21:42)

## 2021-07-08 LAB
ALBUMIN SERPL ELPH-MCNC: 3.1 G/DL — LOW (ref 3.5–5)
ALP SERPL-CCNC: 49 U/L — SIGNIFICANT CHANGE UP (ref 40–120)
ALT FLD-CCNC: 33 U/L DA — SIGNIFICANT CHANGE UP (ref 10–60)
ANION GAP SERPL CALC-SCNC: 6 MMOL/L — SIGNIFICANT CHANGE UP (ref 5–17)
AST SERPL-CCNC: 16 U/L — SIGNIFICANT CHANGE UP (ref 10–40)
BASE EXCESS BLDA CALC-SCNC: 15 MMOL/L — HIGH (ref -2–3)
BASOPHILS # BLD AUTO: 0 K/UL — SIGNIFICANT CHANGE UP (ref 0–0.2)
BASOPHILS NFR BLD AUTO: 0 % — SIGNIFICANT CHANGE UP (ref 0–2)
BILIRUB SERPL-MCNC: 0.4 MG/DL — SIGNIFICANT CHANGE UP (ref 0.2–1.2)
BLOOD GAS COMMENTS ARTERIAL: SIGNIFICANT CHANGE UP
BUN SERPL-MCNC: 29 MG/DL — HIGH (ref 7–18)
CALCIUM SERPL-MCNC: 8.7 MG/DL — SIGNIFICANT CHANGE UP (ref 8.4–10.5)
CHLORIDE SERPL-SCNC: 95 MMOL/L — LOW (ref 96–108)
CO2 SERPL-SCNC: 37 MMOL/L — HIGH (ref 22–31)
CREAT SERPL-MCNC: 0.59 MG/DL — SIGNIFICANT CHANGE UP (ref 0.5–1.3)
EOSINOPHIL # BLD AUTO: 0.07 K/UL — SIGNIFICANT CHANGE UP (ref 0–0.5)
EOSINOPHIL NFR BLD AUTO: 0.9 % — SIGNIFICANT CHANGE UP (ref 0–6)
GLUCOSE SERPL-MCNC: 235 MG/DL — HIGH (ref 70–99)
HCO3 BLDA-SCNC: 44 MMOL/L — HIGH (ref 21–28)
HCT VFR BLD CALC: 41.4 % — SIGNIFICANT CHANGE UP (ref 39–50)
HGB BLD-MCNC: 12.7 G/DL — LOW (ref 13–17)
HOROWITZ INDEX BLDA+IHG-RTO: 35 — SIGNIFICANT CHANGE UP
IMM GRANULOCYTES NFR BLD AUTO: 0.4 % — SIGNIFICANT CHANGE UP (ref 0–1.5)
LYMPHOCYTES # BLD AUTO: 0.54 K/UL — LOW (ref 1–3.3)
LYMPHOCYTES # BLD AUTO: 6.7 % — LOW (ref 13–44)
MAGNESIUM SERPL-MCNC: 2.5 MG/DL — SIGNIFICANT CHANGE UP (ref 1.6–2.6)
MCHC RBC-ENTMCNC: 28.1 PG — SIGNIFICANT CHANGE UP (ref 27–34)
MCHC RBC-ENTMCNC: 30.7 GM/DL — LOW (ref 32–36)
MCV RBC AUTO: 91.6 FL — SIGNIFICANT CHANGE UP (ref 80–100)
MONOCYTES # BLD AUTO: 0.43 K/UL — SIGNIFICANT CHANGE UP (ref 0–0.9)
MONOCYTES NFR BLD AUTO: 5.3 % — SIGNIFICANT CHANGE UP (ref 2–14)
NEUTROPHILS # BLD AUTO: 7.04 K/UL — SIGNIFICANT CHANGE UP (ref 1.8–7.4)
NEUTROPHILS NFR BLD AUTO: 86.7 % — HIGH (ref 43–77)
NRBC # BLD: 0 /100 WBCS — SIGNIFICANT CHANGE UP (ref 0–0)
PCO2 BLDA: 72 MMHG — CRITICAL HIGH (ref 35–48)
PH BLDA: 7.39 — SIGNIFICANT CHANGE UP (ref 7.35–7.45)
PHOSPHATE SERPL-MCNC: 4.1 MG/DL — SIGNIFICANT CHANGE UP (ref 2.5–4.5)
PLATELET # BLD AUTO: 176 K/UL — SIGNIFICANT CHANGE UP (ref 150–400)
PO2 BLDA: 81 MMHG — LOW (ref 83–108)
POTASSIUM SERPL-MCNC: 4.6 MMOL/L — SIGNIFICANT CHANGE UP (ref 3.5–5.3)
POTASSIUM SERPL-SCNC: 4.6 MMOL/L — SIGNIFICANT CHANGE UP (ref 3.5–5.3)
PROT SERPL-MCNC: 6 G/DL — SIGNIFICANT CHANGE UP (ref 6–8.3)
RBC # BLD: 4.52 M/UL — SIGNIFICANT CHANGE UP (ref 4.2–5.8)
RBC # FLD: 12.5 % — SIGNIFICANT CHANGE UP (ref 10.3–14.5)
SAO2 % BLDA: 96 % — SIGNIFICANT CHANGE UP
SODIUM SERPL-SCNC: 138 MMOL/L — SIGNIFICANT CHANGE UP (ref 135–145)
WBC # BLD: 8.11 K/UL — SIGNIFICANT CHANGE UP (ref 3.8–10.5)
WBC # FLD AUTO: 8.11 K/UL — SIGNIFICANT CHANGE UP (ref 3.8–10.5)

## 2021-07-08 RX ORDER — INSULIN LISPRO 100/ML
VIAL (ML) SUBCUTANEOUS
Refills: 0 | Status: DISCONTINUED | OUTPATIENT
Start: 2021-07-08 | End: 2021-07-15

## 2021-07-08 RX ORDER — PANTOPRAZOLE SODIUM 20 MG/1
40 TABLET, DELAYED RELEASE ORAL
Refills: 0 | Status: DISCONTINUED | OUTPATIENT
Start: 2021-07-08 | End: 2021-07-15

## 2021-07-08 RX ADMIN — Medication 3 MILLILITER(S): at 08:22

## 2021-07-08 RX ADMIN — Medication 2: at 05:32

## 2021-07-08 RX ADMIN — PANTOPRAZOLE SODIUM 40 MILLIGRAM(S): 20 TABLET, DELAYED RELEASE ORAL at 11:38

## 2021-07-08 RX ADMIN — Medication 1 PATCH: at 11:16

## 2021-07-08 RX ADMIN — OXYCODONE AND ACETAMINOPHEN 2 TABLET(S): 5; 325 TABLET ORAL at 16:03

## 2021-07-08 RX ADMIN — DEXMEDETOMIDINE HYDROCHLORIDE IN 0.9% SODIUM CHLORIDE 4.72 MICROGRAM(S)/KG/HR: 4 INJECTION INTRAVENOUS at 22:14

## 2021-07-08 RX ADMIN — Medication 1 PATCH: at 11:39

## 2021-07-08 RX ADMIN — Medication 40 MILLIGRAM(S): at 11:37

## 2021-07-08 RX ADMIN — Medication 1 PATCH: at 07:17

## 2021-07-08 RX ADMIN — Medication 40 MILLIGRAM(S): at 17:07

## 2021-07-08 RX ADMIN — MORPHINE SULFATE 1 MILLIGRAM(S): 50 CAPSULE, EXTENDED RELEASE ORAL at 00:14

## 2021-07-08 RX ADMIN — AZITHROMYCIN 255 MILLIGRAM(S): 500 TABLET, FILM COATED ORAL at 09:20

## 2021-07-08 RX ADMIN — Medication 3 MILLILITER(S): at 12:01

## 2021-07-08 RX ADMIN — Medication 81 MILLIGRAM(S): at 11:38

## 2021-07-08 RX ADMIN — Medication 3 MILLILITER(S): at 21:28

## 2021-07-08 RX ADMIN — OLANZAPINE 5 MILLIGRAM(S): 15 TABLET, FILM COATED ORAL at 05:29

## 2021-07-08 RX ADMIN — CHLORHEXIDINE GLUCONATE 1 APPLICATION(S): 213 SOLUTION TOPICAL at 05:32

## 2021-07-08 RX ADMIN — Medication 1 TABLET(S): at 11:38

## 2021-07-08 RX ADMIN — Medication 150 MILLIGRAM(S): at 22:13

## 2021-07-08 RX ADMIN — Medication 3: at 17:05

## 2021-07-08 RX ADMIN — Medication 40 MILLIGRAM(S): at 05:30

## 2021-07-08 RX ADMIN — Medication 3 MILLILITER(S): at 04:29

## 2021-07-08 RX ADMIN — RISPERIDONE 1 MILLIGRAM(S): 4 TABLET ORAL at 11:38

## 2021-07-08 RX ADMIN — ACETAZOLAMIDE 105 MILLIGRAM(S): 250 TABLET ORAL at 13:04

## 2021-07-08 RX ADMIN — ENOXAPARIN SODIUM 40 MILLIGRAM(S): 100 INJECTION SUBCUTANEOUS at 11:42

## 2021-07-08 RX ADMIN — MORPHINE SULFATE 1 MILLIGRAM(S): 50 CAPSULE, EXTENDED RELEASE ORAL at 00:29

## 2021-07-08 RX ADMIN — POLYETHYLENE GLYCOL 3350 17 GRAM(S): 17 POWDER, FOR SOLUTION ORAL at 11:38

## 2021-07-08 RX ADMIN — Medication 4: at 11:40

## 2021-07-08 RX ADMIN — ACETAZOLAMIDE 105 MILLIGRAM(S): 250 TABLET ORAL at 05:37

## 2021-07-08 RX ADMIN — OXYCODONE AND ACETAMINOPHEN 2 TABLET(S): 5; 325 TABLET ORAL at 15:16

## 2021-07-08 RX ADMIN — DEXMEDETOMIDINE HYDROCHLORIDE IN 0.9% SODIUM CHLORIDE 4.72 MICROGRAM(S)/KG/HR: 4 INJECTION INTRAVENOUS at 13:03

## 2021-07-08 RX ADMIN — OLANZAPINE 5 MILLIGRAM(S): 15 TABLET, FILM COATED ORAL at 17:06

## 2021-07-08 RX ADMIN — DEXMEDETOMIDINE HYDROCHLORIDE IN 0.9% SODIUM CHLORIDE 4.72 MICROGRAM(S)/KG/HR: 4 INJECTION INTRAVENOUS at 05:41

## 2021-07-08 RX ADMIN — Medication 3 MILLIGRAM(S): at 22:13

## 2021-07-08 RX ADMIN — Medication 40 MILLIGRAM(S): at 23:56

## 2021-07-08 RX ADMIN — Medication 2: at 22:53

## 2021-07-08 RX ADMIN — Medication 3 MILLILITER(S): at 00:14

## 2021-07-08 RX ADMIN — Medication 3 MILLILITER(S): at 16:20

## 2021-07-08 RX ADMIN — Medication 1 PATCH: at 19:08
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
## 2021-07-08 NOTE — PROGRESS NOTE ADULT - ASSESSMENT
60 YOM with a h/o COPD on 2L home O2, CAD with stent, CHF, DM, GERD, HLD, pHTN,  asthma, anxiety, and panic attacks was brought in by ambulette from his assisted living facility for shortness of breath was admitted for COPD exacerbation. He was found to be encephalopathic, had co2 retention. Patient is being transferred to ICU for BiPAP placement.    #Acute encephalopathy  #COPD exacerbation  #CHF  #CAD  #DM  #HTN    #Neuro:  Patient was found to altered mental status which resolved  Patient currently 7/7 sedated  Maintain BiPAP and high flow o2  Patient agitated and placed on 1:1  d/c xanax    #CVS:  Patient has h/o CHF, on lasix at home  Patient was found to have mild pleural effusion on R base  Lasix stopped  Does not look fluid overloaded, not in CHF exacerbation  Also has stented artery  echo details above: normal LV size and function, RV enlargement with normal function, RV systolic pressure 59 mm Hg, moderate P      Pulmonology:  Patient was found to have AMS, 2/2 to CO2 retention  Patient noncompliant with BIPAP  7.33|103 |147| 54  azithromycin 500 x 3  repeat CXR  c/w duoneb q4  C/w methylprednisolone 40mg q6  f/u repeat abg  add morphin 1mg q12 for pain  Monitor respiratory status    GI:  No issues  NPO while on BIPAP    Endo:  Has h/o DM  Fq6 while NPO  c/w sliding scale    Renal:  had respiratory acidosis with ph of 7.2  Now ph 7.33  Will monitor and repeat ABG    Prophylactic:  On lovenox   on PPI    GOC:  Full code 60 YOM with a h/o COPD on 2L home O2, CAD with stent, CHF, DM, GERD, HLD, pHTN,  asthma, anxiety, and panic attacks was brought in by ambulance from his assisted living facility for shortness of breath was admitted for COPD exacerbation. He was found to be encephalopathic, had co2 retention. Patient is being transferred to ICU for BiPAP placement.    #Acute encephalopathy  #COPD exacerbation  #CHF  #CAD  #DM  #HTN    #Neuro:  Patient was found to altered mental status which resolved  d/c precedex  Patient on enhancement      #CVS:  Patient has h/o CHF, on lasix at home  Patient was found to have mild pleural effusion on R base  Lasix stopped  Does not look fluid overloaded, not in CHF exacerbation  Also has stented artery  echo details above: normal LV size and function, RV enlargement with normal function, RV systolic pressure 59 mm Hg, moderate P      Pulmonology:  Patient was found to have AMS, 2/2 to CO2 retention which has resolved  Patient noncompliant with BIPAP  abg has improved  7.39|72 |81| 96  c/w azithromycin 500 x 3  c/w duoneb q4  C/w methylprednisolone 40mg q6  c/w morphin 1mg q12 for pain  Monitor respiratory status  f/u repeat abg  d/c HFO  BIPAP at night w/ NC supplementation    GI:  No issues  NPO while on BIPAP    Endo:  Has h/o DM  Fq6 while NPO  c/w sliding scale    Renal:  had respiratory acidosis with ph of 7.2  Now ph 7.39  Will monitor and repeat ABG      d/c mark    Prophylactic:  On lovenox   on PPI    GOC:  Full code

## 2021-07-08 NOTE — PROGRESS NOTE ADULT - SUBJECTIVE AND OBJECTIVE BOX
INTERVAL HPI/OVERNIGHT EVENTS: ***    PRESSORS: [ ] YES [ ] NO  WHICH:      Antimicrobial:  azithromycin  IVPB 500 milliGRAM(s) IV Intermittent every 24 hours  azithromycin  IVPB        Cardiovascular:  acetaZOLAMIDE  IVPB 250 milliGRAM(s) IV Intermittent every 8 hours    Pulmonary:  albuterol/ipratropium for Nebulization 3 milliLiter(s) Nebulizer every 4 hours    Hematalogic:  aspirin  chewable 81 milliGRAM(s) Oral daily  enoxaparin Injectable 40 milliGRAM(s) SubCutaneous daily    Other:  chlorhexidine 2% Cloths 1 Application(s) Topical <User Schedule>  dexMEDEtomidine Infusion 0.2 MICROgram(s)/kG/Hr IV Continuous <Continuous>  insulin lispro (ADMELOG) corrective regimen sliding scale   SubCutaneous every 6 hours  insulin lispro (ADMELOG) corrective regimen sliding scale   SubCutaneous at bedtime  melatonin 3 milliGRAM(s) Oral at bedtime  methylPREDNISolone sodium succinate Injectable 40 milliGRAM(s) IV Push every 6 hours  morphine  - Injectable 1 milliGRAM(s) IV Push every 12 hours PRN  multivitamin/minerals 1 Tablet(s) Oral daily  nicotine -   7 mG/24Hr(s) Patch 1 patch Transdermal daily  OLANZapine Injectable 5 milliGRAM(s) IntraMuscular every 12 hours  oxycodone    5 mG/acetaminophen 325 mG 2 Tablet(s) Oral every 6 hours PRN  pantoprazole  Injectable 40 milliGRAM(s) IV Push daily  polyethylene glycol 3350 17 Gram(s) Oral daily  risperiDONE   Tablet 1 milliGRAM(s) Oral daily  traZODone 150 milliGRAM(s) Oral at bedtime    acetaZOLAMIDE  IVPB 250 milliGRAM(s) IV Intermittent every 8 hours  albuterol/ipratropium for Nebulization 3 milliLiter(s) Nebulizer every 4 hours  aspirin  chewable 81 milliGRAM(s) Oral daily  azithromycin  IVPB 500 milliGRAM(s) IV Intermittent every 24 hours  azithromycin  IVPB      chlorhexidine 2% Cloths 1 Application(s) Topical <User Schedule>  dexMEDEtomidine Infusion 0.2 MICROgram(s)/kG/Hr IV Continuous <Continuous>  enoxaparin Injectable 40 milliGRAM(s) SubCutaneous daily  insulin lispro (ADMELOG) corrective regimen sliding scale   SubCutaneous every 6 hours  insulin lispro (ADMELOG) corrective regimen sliding scale   SubCutaneous at bedtime  melatonin 3 milliGRAM(s) Oral at bedtime  methylPREDNISolone sodium succinate Injectable 40 milliGRAM(s) IV Push every 6 hours  morphine  - Injectable 1 milliGRAM(s) IV Push every 12 hours PRN  multivitamin/minerals 1 Tablet(s) Oral daily  nicotine -   7 mG/24Hr(s) Patch 1 patch Transdermal daily  OLANZapine Injectable 5 milliGRAM(s) IntraMuscular every 12 hours  oxycodone    5 mG/acetaminophen 325 mG 2 Tablet(s) Oral every 6 hours PRN  pantoprazole  Injectable 40 milliGRAM(s) IV Push daily  polyethylene glycol 3350 17 Gram(s) Oral daily  risperiDONE   Tablet 1 milliGRAM(s) Oral daily  traZODone 150 milliGRAM(s) Oral at bedtime    Drug Dosing Weight  Height (cm): 172.7 (07 Jul 2021 16:29)  Weight (kg): 94.3 (05 Jul 2021 09:15)  BMI (kg/m2): 31.6 (07 Jul 2021 16:29)  BSA (m2): 2.08 (07 Jul 2021 16:29)    CENTRAL LINE: [ ] YES [ ] NO  LOCATION:         AMOR: [ ] YES [ ] NO          A-LINE:  [ ] YES [ ] NO  LOCATION:             ICU Vital Signs Last 24 Hrs  T(C): 35.8 (08 Jul 2021 04:24), Max: 36.6 (07 Jul 2021 07:30)  T(F): 96.5 (08 Jul 2021 04:24), Max: 97.9 (07 Jul 2021 07:30)  HR: 65 (08 Jul 2021 06:00) (59 - 82)  BP: 103/82 (08 Jul 2021 06:00) (92/76 - 138/68)  BP(mean): 87 (08 Jul 2021 06:00) (51 - 90)  ABP: --  ABP(mean): --  RR: 27 (08 Jul 2021 06:00) (19 - 35)  SpO2: 99% (08 Jul 2021 06:00) (82% - 100%)      ABG - ( 08 Jul 2021 04:09 )  pH, Arterial: 7.39  pH, Blood: x     /  pCO2: 72    /  pO2: 81    / HCO3: 44    / Base Excess: 15.0  /  SaO2: 96                    07-06 @ 07:01  -  07-07 @ 07:00  --------------------------------------------------------  IN: 632.8 mL / OUT: 2230 mL / NET: -1597.2 mL            REVIEW OF SYSTEMS:    CONSTITUTIONAL: No weakness, fevers or chills  NECK: No pain or stiffness  RESPIRATORY: No cough, wheezing, hemoptysis; No shortness of breath  CARDIOVASCULAR: No chest pain or palpitations  GASTROINTESTINAL: No abdominal or epigastric pain. No nausea, vomiting, No diarrhea or constipation. No melena or hematochezia.  GENITOURINARY: No dysuria, frequency or hematuria  NEUROLOGICAL: No numbness or weakness  All other review of systems is negative unless indicated above      PHYSICAL EXAM:    GENERAL: NAD, well-groomed, well-developed  EYES: EOMI, PERRLA,   NECK: Supple, No JVD; Normal thyroid; Trachea midline  NERVOUS SYSTEM:  Alert & Oriented X3,  Motor Strength 5/5 B/L upper and lower extremities; DTRs 2+ intact and symmetric  CHEST/LUNG: No rales, rhonchi, wheezing   HEART: Regular rate and rhythm; No murmurs,   ABDOMEN: Soft, Nontender, Nondistended; Bowel sounds present  EXTREMITIES:  2+ Peripheral Pulses, No clubbing, cyanosis, or edema        LABS:  CBC Full  -  ( 08 Jul 2021 04:24 )  WBC Count : 8.11 K/uL  RBC Count : 4.52 M/uL  Hemoglobin : 12.7 g/dL  Hematocrit : 41.4 %  Platelet Count - Automated : 176 K/uL  Mean Cell Volume : 91.6 fl  Mean Cell Hemoglobin : 28.1 pg  Mean Cell Hemoglobin Concentration : 30.7 gm/dL  Auto Neutrophil # : 7.04 K/uL  Auto Lymphocyte # : 0.54 K/uL  Auto Monocyte # : 0.43 K/uL  Auto Eosinophil # : 0.07 K/uL  Auto Basophil # : 0.00 K/uL  Auto Neutrophil % : 86.7 %  Auto Lymphocyte % : 6.7 %  Auto Monocyte % : 5.3 %  Auto Eosinophil % : 0.9 %  Auto Basophil % : 0.0 %    07-08    138  |  95<L>  |  29<H>  ----------------------------<  235<H>  4.6   |  37<H>  |  0.59    Ca    8.7      08 Jul 2021 04:24  Phos  4.1     07-08  Mg     2.5     07-08    TPro  6.0  /  Alb  3.1<L>  /  TBili  0.4  /  DBili  x   /  AST  16  /  ALT  33  /  AlkPhos  49  07-08            RADIOLOGY & ADDITIONAL STUDIES REVIEWED:  ***    [ ]GOALS OF CARE DISCUSSION WITH PATIENT/FAMILY/PROXY:    CRITICAL CARE TIME SPENT: 35 minutes INTERVAL HPI/OVERNIGHT EVENTS: Patient non-compliant with BIPAP overnight. He was on bipap for 1 hour but otherwise was on high flow oxygen    PRESSORS: [ ] YES [x ] NO  WHICH:      Antimicrobial:  azithromycin  IVPB 500 milliGRAM(s) IV Intermittent every 24 hours  azithromycin  IVPB        Cardiovascular:  acetaZOLAMIDE  IVPB 250 milliGRAM(s) IV Intermittent every 8 hours    Pulmonary:  albuterol/ipratropium for Nebulization 3 milliLiter(s) Nebulizer every 4 hours    Hematalogic:  aspirin  chewable 81 milliGRAM(s) Oral daily  enoxaparin Injectable 40 milliGRAM(s) SubCutaneous daily    Other:  chlorhexidine 2% Cloths 1 Application(s) Topical <User Schedule>  dexMEDEtomidine Infusion 0.2 MICROgram(s)/kG/Hr IV Continuous <Continuous>  insulin lispro (ADMELOG) corrective regimen sliding scale   SubCutaneous every 6 hours  insulin lispro (ADMELOG) corrective regimen sliding scale   SubCutaneous at bedtime  melatonin 3 milliGRAM(s) Oral at bedtime  methylPREDNISolone sodium succinate Injectable 40 milliGRAM(s) IV Push every 6 hours  morphine  - Injectable 1 milliGRAM(s) IV Push every 12 hours PRN  multivitamin/minerals 1 Tablet(s) Oral daily  nicotine -   7 mG/24Hr(s) Patch 1 patch Transdermal daily  OLANZapine Injectable 5 milliGRAM(s) IntraMuscular every 12 hours  oxycodone    5 mG/acetaminophen 325 mG 2 Tablet(s) Oral every 6 hours PRN  pantoprazole  Injectable 40 milliGRAM(s) IV Push daily  polyethylene glycol 3350 17 Gram(s) Oral daily  risperiDONE   Tablet 1 milliGRAM(s) Oral daily  traZODone 150 milliGRAM(s) Oral at bedtime    acetaZOLAMIDE  IVPB 250 milliGRAM(s) IV Intermittent every 8 hours  albuterol/ipratropium for Nebulization 3 milliLiter(s) Nebulizer every 4 hours  aspirin  chewable 81 milliGRAM(s) Oral daily  azithromycin  IVPB 500 milliGRAM(s) IV Intermittent every 24 hours  azithromycin  IVPB      chlorhexidine 2% Cloths 1 Application(s) Topical <User Schedule>  dexMEDEtomidine Infusion 0.2 MICROgram(s)/kG/Hr IV Continuous <Continuous>  enoxaparin Injectable 40 milliGRAM(s) SubCutaneous daily  insulin lispro (ADMELOG) corrective regimen sliding scale   SubCutaneous every 6 hours  insulin lispro (ADMELOG) corrective regimen sliding scale   SubCutaneous at bedtime  melatonin 3 milliGRAM(s) Oral at bedtime  methylPREDNISolone sodium succinate Injectable 40 milliGRAM(s) IV Push every 6 hours  morphine  - Injectable 1 milliGRAM(s) IV Push every 12 hours PRN  multivitamin/minerals 1 Tablet(s) Oral daily  nicotine -   7 mG/24Hr(s) Patch 1 patch Transdermal daily  OLANZapine Injectable 5 milliGRAM(s) IntraMuscular every 12 hours  oxycodone    5 mG/acetaminophen 325 mG 2 Tablet(s) Oral every 6 hours PRN  pantoprazole  Injectable 40 milliGRAM(s) IV Push daily  polyethylene glycol 3350 17 Gram(s) Oral daily  risperiDONE   Tablet 1 milliGRAM(s) Oral daily  traZODone 150 milliGRAM(s) Oral at bedtime    Drug Dosing Weight  Height (cm): 172.7 (07 Jul 2021 16:29)  Weight (kg): 94.3 (05 Jul 2021 09:15)  BMI (kg/m2): 31.6 (07 Jul 2021 16:29)  BSA (m2): 2.08 (07 Jul 2021 16:29)    CENTRAL LINE: [ ] YES [x ] NO  LOCATION:         AMOR: [ x] YES [ ] NO          A-LINE:  [ ] YES [x ] NO  LOCATION:             ICU Vital Signs Last 24 Hrs  T(C): 35.8 (08 Jul 2021 04:24), Max: 36.6 (07 Jul 2021 07:30)  T(F): 96.5 (08 Jul 2021 04:24), Max: 97.9 (07 Jul 2021 07:30)  HR: 65 (08 Jul 2021 06:00) (59 - 82)  BP: 103/82 (08 Jul 2021 06:00) (92/76 - 138/68)  BP(mean): 87 (08 Jul 2021 06:00) (51 - 90)  ABP: --  ABP(mean): --  RR: 27 (08 Jul 2021 06:00) (19 - 35)  SpO2: 99% (08 Jul 2021 06:00) (82% - 100%)      ABG - ( 08 Jul 2021 04:09 )  pH, Arterial: 7.39  pH, Blood: x     /  pCO2: 72    /  pO2: 81    / HCO3: 44    / Base Excess: 15.0  /  SaO2: 96                    07-06 @ 07:01  -  07-07 @ 07:00  --------------------------------------------------------  IN: 632.8 mL / OUT: 2230 mL / NET: -1597.2 mL            REVIEW OF SYSTEMS:    CONSTITUTIONAL: No weakness, fevers or chills  RESPIRATORY: No shortness of breath  CARDIOVASCULAR: No chest pain or palpitations  GASTROINTESTINAL: No abdominal or epigastric pain. No nausea, vomiting, No diarrhea or constipation. No melena or hematochezia.  GENITOURINARY: No dysuria, frequency or hematuria  NEUROLOGICAL: No numbness or weakness  All other review of systems is negative unless indicated above      PHYSICAL EXAM:    GENERAL: NAD, patient laying in bed mildly sedated  EYES:  PERRLA,   NECK: No JVD; Normal thyroid; Trachea midline  NERVOUS SYSTEM: Sedated, unable to assess further  CHEST/LUNG: No rales, rhonchi, mild wheezing b/l  HEART: Regular rate and rhythm; No murmurs,   ABDOMEN: Soft, Nondistended; Bowel sounds present        LABS:  CBC Full  -  ( 08 Jul 2021 04:24 )  WBC Count : 8.11 K/uL  RBC Count : 4.52 M/uL  Hemoglobin : 12.7 g/dL  Hematocrit : 41.4 %  Platelet Count - Automated : 176 K/uL  Mean Cell Volume : 91.6 fl  Mean Cell Hemoglobin : 28.1 pg  Mean Cell Hemoglobin Concentration : 30.7 gm/dL  Auto Neutrophil # : 7.04 K/uL  Auto Lymphocyte # : 0.54 K/uL  Auto Monocyte # : 0.43 K/uL  Auto Eosinophil # : 0.07 K/uL  Auto Basophil # : 0.00 K/uL  Auto Neutrophil % : 86.7 %  Auto Lymphocyte % : 6.7 %  Auto Monocyte % : 5.3 %  Auto Eosinophil % : 0.9 %  Auto Basophil % : 0.0 %    07-08    138  |  95<L>  |  29<H>  ----------------------------<  235<H>  4.6   |  37<H>  |  0.59    Ca    8.7      08 Jul 2021 04:24  Phos  4.1     07-08  Mg     2.5     07-08    TPro  6.0  /  Alb  3.1<L>  /  TBili  0.4  /  DBili  x   /  AST  16  /  ALT  33  /  AlkPhos  49  07-08            RADIOLOGY & ADDITIONAL STUDIES REVIEWED:   < from: Xray Chest 1 View- PORTABLE-Urgent (Xray Chest 1 View- PORTABLE-Urgent .) (07.07.21 @ 10:46) >  IMPRESSION:   No evidence of active chest disease.  No interval change    < end of copied text >      [ ]GOALS OF CARE DISCUSSION WITH PATIENT/FAMILY/PROXY:    CRITICAL CARE TIME SPENT: 35 minutes

## 2021-07-09 LAB
ALBUMIN SERPL ELPH-MCNC: 3.1 G/DL — LOW (ref 3.5–5)
ALP SERPL-CCNC: 46 U/L — SIGNIFICANT CHANGE UP (ref 40–120)
ALT FLD-CCNC: 30 U/L DA — SIGNIFICANT CHANGE UP (ref 10–60)
ANION GAP SERPL CALC-SCNC: 5 MMOL/L — SIGNIFICANT CHANGE UP (ref 5–17)
ANISOCYTOSIS BLD QL: SLIGHT — SIGNIFICANT CHANGE UP
AST SERPL-CCNC: 8 U/L — LOW (ref 10–40)
BASE EXCESS BLDA CALC-SCNC: 4.5 MMOL/L — HIGH (ref -2–3)
BASE EXCESS BLDA CALC-SCNC: 6.1 MMOL/L — HIGH (ref -2–3)
BASOPHILS # BLD AUTO: 0 K/UL — SIGNIFICANT CHANGE UP (ref 0–0.2)
BASOPHILS NFR BLD AUTO: 0 % — SIGNIFICANT CHANGE UP (ref 0–2)
BILIRUB SERPL-MCNC: 0.5 MG/DL — SIGNIFICANT CHANGE UP (ref 0.2–1.2)
BLOOD GAS COMMENTS ARTERIAL: SIGNIFICANT CHANGE UP
BLOOD GAS COMMENTS ARTERIAL: SIGNIFICANT CHANGE UP
BUN SERPL-MCNC: 25 MG/DL — HIGH (ref 7–18)
CALCIUM SERPL-MCNC: 8.5 MG/DL — SIGNIFICANT CHANGE UP (ref 8.4–10.5)
CHLORIDE SERPL-SCNC: 101 MMOL/L — SIGNIFICANT CHANGE UP (ref 96–108)
CO2 SERPL-SCNC: 30 MMOL/L — SIGNIFICANT CHANGE UP (ref 22–31)
CREAT SERPL-MCNC: 0.57 MG/DL — SIGNIFICANT CHANGE UP (ref 0.5–1.3)
EOSINOPHIL # BLD AUTO: 0 K/UL — SIGNIFICANT CHANGE UP (ref 0–0.5)
EOSINOPHIL NFR BLD AUTO: 0 % — SIGNIFICANT CHANGE UP (ref 0–6)
GLUCOSE SERPL-MCNC: 285 MG/DL — HIGH (ref 70–99)
HCO3 BLDA-SCNC: 30 MMOL/L — HIGH (ref 21–28)
HCO3 BLDA-SCNC: 34 MMOL/L — HIGH (ref 21–28)
HCT VFR BLD CALC: 41.7 % — SIGNIFICANT CHANGE UP (ref 39–50)
HGB BLD-MCNC: 13.2 G/DL — SIGNIFICANT CHANGE UP (ref 13–17)
HOROWITZ INDEX BLDA+IHG-RTO: 32 — SIGNIFICANT CHANGE UP
HOROWITZ INDEX BLDA+IHG-RTO: 38 — SIGNIFICANT CHANGE UP
LYMPHOCYTES # BLD AUTO: 0.24 K/UL — LOW (ref 1–3.3)
LYMPHOCYTES # BLD AUTO: 3 % — LOW (ref 13–44)
MAGNESIUM SERPL-MCNC: 2.5 MG/DL — SIGNIFICANT CHANGE UP (ref 1.6–2.6)
MANUAL SMEAR VERIFICATION: SIGNIFICANT CHANGE UP
MCHC RBC-ENTMCNC: 28.4 PG — SIGNIFICANT CHANGE UP (ref 27–34)
MCHC RBC-ENTMCNC: 31.7 GM/DL — LOW (ref 32–36)
MCV RBC AUTO: 89.9 FL — SIGNIFICANT CHANGE UP (ref 80–100)
MONOCYTES # BLD AUTO: 0.16 K/UL — SIGNIFICANT CHANGE UP (ref 0–0.9)
MONOCYTES NFR BLD AUTO: 2 % — SIGNIFICANT CHANGE UP (ref 2–14)
NEUTROPHILS # BLD AUTO: 7.64 K/UL — HIGH (ref 1.8–7.4)
NEUTROPHILS NFR BLD AUTO: 95 % — HIGH (ref 43–77)
NRBC # BLD: 0 /100 — SIGNIFICANT CHANGE UP (ref 0–0)
PCO2 BLDA: 49 MMHG — HIGH (ref 35–48)
PCO2 BLDA: 61 MMHG — HIGH (ref 35–48)
PH BLDA: 7.35 — SIGNIFICANT CHANGE UP (ref 7.35–7.45)
PH BLDA: 7.4 — SIGNIFICANT CHANGE UP (ref 7.35–7.45)
PHOSPHATE SERPL-MCNC: 3.2 MG/DL — SIGNIFICANT CHANGE UP (ref 2.5–4.5)
PLAT MORPH BLD: NORMAL — SIGNIFICANT CHANGE UP
PLATELET # BLD AUTO: 170 K/UL — SIGNIFICANT CHANGE UP (ref 150–400)
PLATELET COUNT - ESTIMATE: NORMAL — SIGNIFICANT CHANGE UP
PO2 BLDA: 77 MMHG — LOW (ref 83–108)
PO2 BLDA: 83 MMHG — SIGNIFICANT CHANGE UP (ref 83–108)
POIKILOCYTOSIS BLD QL AUTO: SLIGHT — SIGNIFICANT CHANGE UP
POTASSIUM SERPL-MCNC: 4.5 MMOL/L — SIGNIFICANT CHANGE UP (ref 3.5–5.3)
POTASSIUM SERPL-SCNC: 4.5 MMOL/L — SIGNIFICANT CHANGE UP (ref 3.5–5.3)
PROT SERPL-MCNC: 6.1 G/DL — SIGNIFICANT CHANGE UP (ref 6–8.3)
RBC # BLD: 4.64 M/UL — SIGNIFICANT CHANGE UP (ref 4.2–5.8)
RBC # FLD: 12.8 % — SIGNIFICANT CHANGE UP (ref 10.3–14.5)
RBC BLD AUTO: ABNORMAL
SAO2 % BLDA: 96 % — SIGNIFICANT CHANGE UP
SAO2 % BLDA: 98 % — SIGNIFICANT CHANGE UP
SODIUM SERPL-SCNC: 136 MMOL/L — SIGNIFICANT CHANGE UP (ref 135–145)
TARGETS BLD QL SMEAR: SLIGHT — SIGNIFICANT CHANGE UP
WBC # BLD: 8.04 K/UL — SIGNIFICANT CHANGE UP (ref 3.8–10.5)
WBC # FLD AUTO: 8.04 K/UL — SIGNIFICANT CHANGE UP (ref 3.8–10.5)

## 2021-07-09 RX ORDER — SODIUM CHLORIDE 9 MG/ML
500 INJECTION, SOLUTION INTRAVENOUS ONCE
Refills: 0 | Status: COMPLETED | OUTPATIENT
Start: 2021-07-09 | End: 2021-07-09

## 2021-07-09 RX ORDER — ALBUTEROL 90 UG/1
2 AEROSOL, METERED ORAL EVERY 6 HOURS
Refills: 0 | Status: DISCONTINUED | OUTPATIENT
Start: 2021-07-09 | End: 2021-07-15

## 2021-07-09 RX ORDER — IPRATROPIUM/ALBUTEROL SULFATE 18-103MCG
3 AEROSOL WITH ADAPTER (GRAM) INHALATION ONCE
Refills: 0 | Status: DISCONTINUED | OUTPATIENT
Start: 2021-07-09 | End: 2021-07-09

## 2021-07-09 RX ORDER — AZITHROMYCIN 500 MG/1
500 TABLET, FILM COATED ORAL DAILY
Refills: 0 | Status: COMPLETED | OUTPATIENT
Start: 2021-07-10 | End: 2021-07-13

## 2021-07-09 RX ADMIN — Medication 3 MILLILITER(S): at 12:22

## 2021-07-09 RX ADMIN — MORPHINE SULFATE 1 MILLIGRAM(S): 50 CAPSULE, EXTENDED RELEASE ORAL at 12:22

## 2021-07-09 RX ADMIN — OXYCODONE AND ACETAMINOPHEN 2 TABLET(S): 5; 325 TABLET ORAL at 22:02

## 2021-07-09 RX ADMIN — CHLORHEXIDINE GLUCONATE 1 APPLICATION(S): 213 SOLUTION TOPICAL at 05:18

## 2021-07-09 RX ADMIN — MORPHINE SULFATE 1 MILLIGRAM(S): 50 CAPSULE, EXTENDED RELEASE ORAL at 11:53

## 2021-07-09 RX ADMIN — Medication 150 MILLIGRAM(S): at 21:07

## 2021-07-09 RX ADMIN — Medication 1 PATCH: at 11:51

## 2021-07-09 RX ADMIN — Medication 3 MILLILITER(S): at 20:08

## 2021-07-09 RX ADMIN — PANTOPRAZOLE SODIUM 40 MILLIGRAM(S): 20 TABLET, DELAYED RELEASE ORAL at 05:24

## 2021-07-09 RX ADMIN — Medication 3 MILLIGRAM(S): at 21:08

## 2021-07-09 RX ADMIN — Medication 40 MILLIGRAM(S): at 11:52

## 2021-07-09 RX ADMIN — OXYCODONE AND ACETAMINOPHEN 2 TABLET(S): 5; 325 TABLET ORAL at 21:07

## 2021-07-09 RX ADMIN — Medication 3 MILLILITER(S): at 09:07

## 2021-07-09 RX ADMIN — Medication 3 MILLILITER(S): at 16:08

## 2021-07-09 RX ADMIN — ALBUTEROL 2 PUFF(S): 90 AEROSOL, METERED ORAL at 22:11

## 2021-07-09 RX ADMIN — Medication 3 MILLILITER(S): at 00:15

## 2021-07-09 RX ADMIN — Medication 3: at 06:10

## 2021-07-09 RX ADMIN — POLYETHYLENE GLYCOL 3350 17 GRAM(S): 17 POWDER, FOR SOLUTION ORAL at 11:51

## 2021-07-09 RX ADMIN — Medication 1 PATCH: at 19:22

## 2021-07-09 RX ADMIN — SODIUM CHLORIDE 1000 MILLILITER(S): 9 INJECTION, SOLUTION INTRAVENOUS at 22:12

## 2021-07-09 RX ADMIN — Medication 2: at 21:08

## 2021-07-09 RX ADMIN — OLANZAPINE 5 MILLIGRAM(S): 15 TABLET, FILM COATED ORAL at 17:15

## 2021-07-09 RX ADMIN — Medication 4: at 11:42

## 2021-07-09 RX ADMIN — AZITHROMYCIN 255 MILLIGRAM(S): 500 TABLET, FILM COATED ORAL at 10:18

## 2021-07-09 RX ADMIN — Medication 3 MILLILITER(S): at 04:08

## 2021-07-09 RX ADMIN — Medication 1 PATCH: at 11:21

## 2021-07-09 RX ADMIN — RISPERIDONE 1 MILLIGRAM(S): 4 TABLET ORAL at 11:52

## 2021-07-09 RX ADMIN — Medication 3: at 16:08

## 2021-07-09 RX ADMIN — Medication 81 MILLIGRAM(S): at 11:52

## 2021-07-09 RX ADMIN — Medication 1 PATCH: at 07:41

## 2021-07-09 RX ADMIN — Medication 1 TABLET(S): at 11:53

## 2021-07-09 RX ADMIN — ENOXAPARIN SODIUM 40 MILLIGRAM(S): 100 INJECTION SUBCUTANEOUS at 11:51

## 2021-07-09 RX ADMIN — Medication 40 MILLIGRAM(S): at 05:20

## 2021-07-09 RX ADMIN — OLANZAPINE 5 MILLIGRAM(S): 15 TABLET, FILM COATED ORAL at 05:21
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
## 2021-07-09 NOTE — PROGRESS NOTE ADULT - ASSESSMENT
60 YOM with a h/o COPD on 2L home O2, CAD with stent, CHF, DM, GERD, HLD, pHTN,  asthma, anxiety, and panic attacks was brought in by ambulance from his assisted living facility for shortness of breath was admitted for COPD exacerbation. He was found to be encephalopathic, had co2 retention. Patient is being transferred to ICU for BiPAP placement.    #Acute encephalopathy  #COPD exacerbation  #CHF  #CAD  #DM  #HTN    #Neuro:  Patient was found to altered mental status which resolved  d/c precedex  Patient on enhancement      #CVS:  Patient has h/o CHF, on lasix at home  Patient was found to have mild pleural effusion on R base  Lasix stopped  Does not look fluid overloaded, not in CHF exacerbation  Also has stented artery  echo details above: normal LV size and function, RV enlargement with normal function, RV systolic pressure 59 mm Hg, moderate P      Pulmonology:  Patient was found to have AMS, 2/2 to CO2 retention which has resolved  Patient noncompliant with BIPAP  abg has improved  7.39|72 |81| 96  c/w azithromycin 500 x 3  c/w duoneb q4  C/w methylprednisolone 40mg q6  c/w morphin 1mg q12 for pain  Monitor respiratory status  f/u repeat abg  d/c HFO  BIPAP at night w/ NC supplementation    GI:  No issues  NPO while on BIPAP    Endo:  Has h/o DM  Fq6 while NPO  c/w sliding scale    Renal:  had respiratory acidosis with ph of 7.2  Now ph 7.39  Will monitor and repeat ABG      d/c mark    Prophylactic:  On lovenox   on PPI    GOC:  Full code 60 YOM with a h/o COPD on 2L home O2, CAD with stent, CHF, DM, GERD, HLD, pHTN,  asthma, anxiety, and panic attacks was brought in by ambulance from his assisted living facility for shortness of breath was admitted for COPD exacerbation. He was found to be encephalopathic, had co2 retention. Patient is being transferred to ICU for BiPAP placement.    #Acute encephalopathy  #COPD exacerbation  #CHF  #CAD  #DM  #HTN    #Neuro:  Patient was found to have altered mental status which resolved  off precedex  Patient on enhancement      #CVS:  Patient has h/o CHF, on lasix at home  Patient was found to have mild pleural effusion on R base  Lasix stopped  Does not look fluid overloaded, not in CHF exacerbation  Also has stented artery  echo details above: normal LV size and function, RV enlargement with normal function, RV systolic pressure 59 mm Hg, moderate P      Pulmonology:  Patient was found to have AMS, 2/2 to CO2 retention which has resolved  Patient noncompliant with BIPAP  abg has improved  7.35|61 |77| 34  oral azithromycin 500 x 3  q6 duoneb  taper off methylprednisolone 40mg q6  Monitor respiratory status  BIPAP at night w/ NC supplementation    GI:  No issues  NPO while on BIPAP    Endo:  Has h/o DM  Fq6 while NPO  c/w sliding scale    Renal:  had respiratory acidosis with ph of 7.2  Now ph 7.35  Will monitor and repeat ABG    BRANDON  d/c [facility]    Prophylactic:  On lovenox   on PPI    GOC:  Full code

## 2021-07-09 NOTE — PHYSICAL THERAPY INITIAL EVALUATION ADULT - DISCHARGE DISPOSITION, PT EVAL
return to assisted living facility; PT will follow-up while admitted for strength, endurance, and mobility optimization prior to discharge

## 2021-07-09 NOTE — CHART NOTE - NSCHARTNOTEFT_GEN_A_CORE
HPI:  60 YOM with a h/o COPD on 2L home O2, CAD with stent, CHF, DM, GERD, HLD, pHTN,  asthma, anxiety, and panic attacks was brought in by ambulance from his assisted living facility for shortness of breath was admitted for COPD exacerbation. He was found to be encephalopathic, had co2 retention. Patient is being transferred to ICU for respiratory failure requiring BiPAP.      ICU course:  Patient was admitted to the ICU due to acute hypoxic respiratory failure 2/2 COPD exacerbation. He was placed on BiPAP and high flow oxygen, but he is generally noncompliant with BiPAP therapy. He was treated with duoneb, solumedrol and azithromycin. Patient is currently hemodynamically stable and is saturating well on 2L NC.    Tejada catheter was removed.    Things to follow:  1) Monitor respiratory status    Signed out to Dr. Ramos and NP HPI:  60 YOM with a h/o COPD on 2L home O2, CAD with stent, CHF, DM, GERD, HLD, pHTN,  asthma, anxiety, and panic attacks was brought in by ambulance from his assisted living facility for shortness of breath was admitted for COPD exacerbation. He was found to be encephalopathic, had co2 retention. Patient is being transferred to ICU for respiratory failure requiring BiPAP.      ICU course:  Patient was admitted to the ICU due to acute hypoxic respiratory failure 2/2 COPD exacerbation. He was placed on BiPAP and high flow oxygen, but he is generally noncompliant with BiPAP therapy. He was treated with duoneb, solumedrol and azithromycin. Patient is currently hemodynamically stable and is saturating well on 2L NC.    Tejada catheter was removed.    Things to follow:  1) Monitor respiratory status    Signed out to Attending Dr. Klarissa Ramos and covering NP Jeffrey HPI:  60 YOM with a h/o COPD on 2L home O2, CAD with stent, CHF, DM, GERD, HLD, pHTN,  asthma, anxiety, and panic attacks was brought in by ambulance from his assisted living facility for shortness of breath was admitted for COPD exacerbation. He was found to be encephalopathic, had co2 retention. Patient is being transferred to ICU for respiratory failure requiring BiPAP.      ICU course:  Patient was admitted to the ICU due to acute hypoxic respiratory failure 2/2 COPD exacerbation. He was placed on BiPAP and high flow oxygen, but he is generally noncompliant with BiPAP therapy. He was treated with duoneb, solumedrol and azithromycin. Patient is currently hemodynamically stable and is saturating well on 2L NC and on BIPAP at night.    Tejada catheter was removed.    Things to follow:  1) follow up recs by Pulmonologist Dr. Marquez regarding continuing BIPAP and further management.    Signed out to Attending Dr. Klarissa Ramos and covering NP Jeffrey

## 2021-07-09 NOTE — PHYSICAL THERAPY INITIAL EVALUATION ADULT - GENERAL OBSERVATIONS, REHAB EVAL
awake, alert, NAD; + peripheral IV access on right forearm; currently on O2@2L/min via NC; +indwelling urethral catheter

## 2021-07-09 NOTE — PROGRESS NOTE ADULT - ATTENDING COMMENTS
Assessment:  1. Acute on chronic hypercapnic and hypoxic respiratory failure  2. COPD   3. Heart failure with preserved EF   4. Hypertension  5. CAD   6. Diabetes mellitus     Plan:  - Cont. bipap support as tolerated, low dose precedex for bipap compliance  - monitor ABG  - Bronchodilators standing RTC   - Cont IV steroids  - Doubt PNA, as afebrile and no leukocytosis, low procalcitonin  - Azithromycin for possible anti inflammatory affect  - Diamox for metabolic alkalosis  - Appears euvolemic  - Cont. Psych medications  - Oral diet when off bipap   - DVT and stress ulcer prophylaxis  - 1:1 observation for bipap compliance
Assessment:  1. Acute on chronic hypercapnic and hypoxic respiratory failure  2. COPD   3. Heart failure with preserved EF   4. Hypertension  5. CAD   6. Diabetes mellitus     Plan:  - Cont. bipap support as tolerated  - Taper off precedex   - ABG improving  - Bronchodilators standing RTC   - Cont IV steroids  - Doubt PNA, as afebrile and no leukocytosis, low procalcitonin  - Azithromycin for possible anti inflammatory affect  - Diamox for metabolic alkalosis  - Appears euvolemic  - Cont. Psych medications  - Oral diet when off bipap   - DVT and stress ulcer prophylaxis  - 1:1 observation for bipap compliance
IMP: This is a 60 yr old man  with chronic hypoxic resp failure due to COPD requiring supp O2 @  2L , CAD with stent, CHF, DM, GERD, HLD, pHTN,  asthma, anxiety, and panic attacks was brought in by ambulance from his assisted living facility for shortness of breath was admitted for COPD exacerbation. He was found to be encephalopathic, had co2 retention. Patient is being transferred to ICU  for acute on chronic hypoxic hypercapnic resp failure due to acute ex of COPD requiring NIPPV  ( BiPaP) support and 1:1 observation .              Assessment:  1. Acute on chronic hypercapnic and hypoxic respiratory failure  2. COPD EX  3. Heart failure with preserved EF   4. Hypertension  5. CAD   6. Diabetes mellitus uncontrol     Plan:  - Cont. bipap support as tolerated  - Bronchodilators standing RTC   - Cont IV steroids  - Doubt PNA, as afebrile and no leukocytosis, low procalcitonin  - Azithromycin for possible anti inflammatory affect  - Diamox for metabolic alkalosis  - Appears euvolemic  - Cont. Psych medications  - Oral diet when off bipap   - DVT and stress ulcer prophylaxis  - 1:1 observation for bipap compliance .
Assessment:  1. Acute on chronic hypercapnic and hypoxic respiratory failure  2. COPD   3. Heart failure with preserved EF   4. Hypertension  5. CAD   6. Diabetes mellitus     Plan:  - Cont. bipap support  - monitor ABG, improving with NIV support though remains non compliant  - Bronchodilators  - IV steroids  - Doubt PNA, as afebrile and no leukocytosis, low procalcitonin  - Azithromycin for possible anti inflammatory affect  - Appears euvolemic  - Cont. Psych medications, ativan prn for agitation   - Oral diet when off bipap   - DVT and stress ulcer prophylaxis

## 2021-07-09 NOTE — PHYSICAL THERAPY INITIAL EVALUATION ADULT - MODALITIES TREATMENT COMMENTS
patient noted a modified Kezia RPE of 6 when walking up to 150 feet x 1; heart rate elevated to >130 bpm after ambulation of 150 feet x 1

## 2021-07-09 NOTE — PROGRESS NOTE ADULT - SUBJECTIVE AND OBJECTIVE BOX
INTERVAL HPI/OVERNIGHT EVENTS: ***    PRESSORS: [ ] YES [ ] NO  WHICH:      Antimicrobial:  azithromycin  IVPB      azithromycin  IVPB 500 milliGRAM(s) IV Intermittent every 24 hours    Cardiovascular:    Pulmonary:  albuterol/ipratropium for Nebulization 3 milliLiter(s) Nebulizer every 4 hours    Hematalogic:  aspirin  chewable 81 milliGRAM(s) Oral daily  enoxaparin Injectable 40 milliGRAM(s) SubCutaneous daily    Other:  chlorhexidine 2% Cloths 1 Application(s) Topical <User Schedule>  dexMEDEtomidine Infusion 0.2 MICROgram(s)/kG/Hr IV Continuous <Continuous>  insulin lispro (ADMELOG) corrective regimen sliding scale   SubCutaneous three times a day before meals  insulin lispro (ADMELOG) corrective regimen sliding scale   SubCutaneous at bedtime  melatonin 3 milliGRAM(s) Oral at bedtime  methylPREDNISolone sodium succinate Injectable 40 milliGRAM(s) IV Push every 6 hours  morphine  - Injectable 1 milliGRAM(s) IV Push every 12 hours PRN  multivitamin/minerals 1 Tablet(s) Oral daily  nicotine -   7 mG/24Hr(s) Patch 1 patch Transdermal daily  OLANZapine Injectable 5 milliGRAM(s) IntraMuscular every 12 hours  oxycodone    5 mG/acetaminophen 325 mG 2 Tablet(s) Oral every 6 hours PRN  pantoprazole    Tablet 40 milliGRAM(s) Oral before breakfast  polyethylene glycol 3350 17 Gram(s) Oral daily  risperiDONE   Tablet 1 milliGRAM(s) Oral daily  traZODone 150 milliGRAM(s) Oral at bedtime    albuterol/ipratropium for Nebulization 3 milliLiter(s) Nebulizer every 4 hours  aspirin  chewable 81 milliGRAM(s) Oral daily  azithromycin  IVPB      azithromycin  IVPB 500 milliGRAM(s) IV Intermittent every 24 hours  chlorhexidine 2% Cloths 1 Application(s) Topical <User Schedule>  dexMEDEtomidine Infusion 0.2 MICROgram(s)/kG/Hr IV Continuous <Continuous>  enoxaparin Injectable 40 milliGRAM(s) SubCutaneous daily  insulin lispro (ADMELOG) corrective regimen sliding scale   SubCutaneous three times a day before meals  insulin lispro (ADMELOG) corrective regimen sliding scale   SubCutaneous at bedtime  melatonin 3 milliGRAM(s) Oral at bedtime  methylPREDNISolone sodium succinate Injectable 40 milliGRAM(s) IV Push every 6 hours  morphine  - Injectable 1 milliGRAM(s) IV Push every 12 hours PRN  multivitamin/minerals 1 Tablet(s) Oral daily  nicotine -   7 mG/24Hr(s) Patch 1 patch Transdermal daily  OLANZapine Injectable 5 milliGRAM(s) IntraMuscular every 12 hours  oxycodone    5 mG/acetaminophen 325 mG 2 Tablet(s) Oral every 6 hours PRN  pantoprazole    Tablet 40 milliGRAM(s) Oral before breakfast  polyethylene glycol 3350 17 Gram(s) Oral daily  risperiDONE   Tablet 1 milliGRAM(s) Oral daily  traZODone 150 milliGRAM(s) Oral at bedtime    Drug Dosing Weight  Height (cm): 172.7 (07 Jul 2021 16:29)  Weight (kg): 94.3 (05 Jul 2021 09:15)  BMI (kg/m2): 31.6 (07 Jul 2021 16:29)  BSA (m2): 2.08 (07 Jul 2021 16:29)    CENTRAL LINE: [ ] YES [ ] NO  LOCATION:         AMOR: [ ] YES [ ] NO          A-LINE:  [ ] YES [ ] NO  LOCATION:             ICU Vital Signs Last 24 Hrs  T(C): 36.2 (09 Jul 2021 08:00), Max: 36.6 (08 Jul 2021 20:00)  T(F): 97.2 (09 Jul 2021 08:00), Max: 97.9 (08 Jul 2021 20:00)  HR: 100 (09 Jul 2021 08:36) (61 - 118)  BP: 125/82 (09 Jul 2021 08:00) (88/50 - 132/69)  BP(mean): 90 (09 Jul 2021 08:00) (57 - 90)  ABP: --  ABP(mean): --  RR: 32 (09 Jul 2021 08:00) (15 - 39)  SpO2: 90% (09 Jul 2021 08:36) (86% - 100%)      ABG - ( 09 Jul 2021 04:22 )  pH, Arterial: 7.35  pH, Blood: x     /  pCO2: 61    /  pO2: 77    / HCO3: 34    / Base Excess: 6.1   /  SaO2: 96                    07-08 @ 07:01  -  07-09 @ 07:00  --------------------------------------------------------  IN: 1610.6 mL / OUT: 4275 mL / NET: -2664.4 mL            REVIEW OF SYSTEMS:    CONSTITUTIONAL: No weakness, fevers or chills  NECK: No pain or stiffness  RESPIRATORY: No cough, wheezing, hemoptysis; No shortness of breath  CARDIOVASCULAR: No chest pain or palpitations  GASTROINTESTINAL: No abdominal or epigastric pain. No nausea, vomiting, No diarrhea or constipation. No melena or hematochezia.  GENITOURINARY: No dysuria, frequency or hematuria  NEUROLOGICAL: No numbness or weakness  All other review of systems is negative unless indicated above      PHYSICAL EXAM:    GENERAL: NAD, well-groomed, well-developed  EYES: EOMI, PERRLA,   NECK: Supple, No JVD; Normal thyroid; Trachea midline  NERVOUS SYSTEM:  Alert & Oriented X3,  Motor Strength 5/5 B/L upper and lower extremities; DTRs 2+ intact and symmetric  CHEST/LUNG: No rales, rhonchi, wheezing   HEART: Regular rate and rhythm; No murmurs,   ABDOMEN: Soft, Nontender, Nondistended; Bowel sounds present  EXTREMITIES:  2+ Peripheral Pulses, No clubbing, cyanosis, or edema        LABS:  CBC Full  -  ( 09 Jul 2021 05:20 )  WBC Count : 8.04 K/uL  RBC Count : 4.64 M/uL  Hemoglobin : 13.2 g/dL  Hematocrit : 41.7 %  Platelet Count - Automated : 170 K/uL  Mean Cell Volume : 89.9 fl  Mean Cell Hemoglobin : 28.4 pg  Mean Cell Hemoglobin Concentration : 31.7 gm/dL  Auto Neutrophil # : 7.64 K/uL  Auto Lymphocyte # : 0.24 K/uL  Auto Monocyte # : 0.16 K/uL  Auto Eosinophil # : 0.00 K/uL  Auto Basophil # : 0.00 K/uL  Auto Neutrophil % : 95.0 %  Auto Lymphocyte % : 3.0 %  Auto Monocyte % : 2.0 %  Auto Eosinophil % : 0.0 %  Auto Basophil % : 0.0 %    07-09    136  |  101  |  25<H>  ----------------------------<  285<H>  4.5   |  30  |  0.57    Ca    8.5      09 Jul 2021 05:20  Phos  3.2     07-09  Mg     2.5     07-09    TPro  6.1  /  Alb  3.1<L>  /  TBili  0.5  /  DBili  x   /  AST  8<L>  /  ALT  30  /  AlkPhos  46  07-09            RADIOLOGY & ADDITIONAL STUDIES REVIEWED:  ***    [ ]GOALS OF CARE DISCUSSION WITH PATIENT/FAMILY/PROXY:    CRITICAL CARE TIME SPENT: 35 minutes INTERVAL HPI/OVERNIGHT EVENTS: More compliant. Tolerated BIPAP from the evening until    PRESSORS: [ ] YES [ ] NO  WHICH:      Antimicrobial:  azithromycin  IVPB      azithromycin  IVPB 500 milliGRAM(s) IV Intermittent every 24 hours    Cardiovascular:    Pulmonary:  albuterol/ipratropium for Nebulization 3 milliLiter(s) Nebulizer every 4 hours    Hematalogic:  aspirin  chewable 81 milliGRAM(s) Oral daily  enoxaparin Injectable 40 milliGRAM(s) SubCutaneous daily    Other:  chlorhexidine 2% Cloths 1 Application(s) Topical <User Schedule>  dexMEDEtomidine Infusion 0.2 MICROgram(s)/kG/Hr IV Continuous <Continuous>  insulin lispro (ADMELOG) corrective regimen sliding scale   SubCutaneous three times a day before meals  insulin lispro (ADMELOG) corrective regimen sliding scale   SubCutaneous at bedtime  melatonin 3 milliGRAM(s) Oral at bedtime  methylPREDNISolone sodium succinate Injectable 40 milliGRAM(s) IV Push every 6 hours  morphine  - Injectable 1 milliGRAM(s) IV Push every 12 hours PRN  multivitamin/minerals 1 Tablet(s) Oral daily  nicotine -   7 mG/24Hr(s) Patch 1 patch Transdermal daily  OLANZapine Injectable 5 milliGRAM(s) IntraMuscular every 12 hours  oxycodone    5 mG/acetaminophen 325 mG 2 Tablet(s) Oral every 6 hours PRN  pantoprazole    Tablet 40 milliGRAM(s) Oral before breakfast  polyethylene glycol 3350 17 Gram(s) Oral daily  risperiDONE   Tablet 1 milliGRAM(s) Oral daily  traZODone 150 milliGRAM(s) Oral at bedtime    albuterol/ipratropium for Nebulization 3 milliLiter(s) Nebulizer every 4 hours  aspirin  chewable 81 milliGRAM(s) Oral daily  azithromycin  IVPB      azithromycin  IVPB 500 milliGRAM(s) IV Intermittent every 24 hours  chlorhexidine 2% Cloths 1 Application(s) Topical <User Schedule>  dexMEDEtomidine Infusion 0.2 MICROgram(s)/kG/Hr IV Continuous <Continuous>  enoxaparin Injectable 40 milliGRAM(s) SubCutaneous daily  insulin lispro (ADMELOG) corrective regimen sliding scale   SubCutaneous three times a day before meals  insulin lispro (ADMELOG) corrective regimen sliding scale   SubCutaneous at bedtime  melatonin 3 milliGRAM(s) Oral at bedtime  methylPREDNISolone sodium succinate Injectable 40 milliGRAM(s) IV Push every 6 hours  morphine  - Injectable 1 milliGRAM(s) IV Push every 12 hours PRN  multivitamin/minerals 1 Tablet(s) Oral daily  nicotine -   7 mG/24Hr(s) Patch 1 patch Transdermal daily  OLANZapine Injectable 5 milliGRAM(s) IntraMuscular every 12 hours  oxycodone    5 mG/acetaminophen 325 mG 2 Tablet(s) Oral every 6 hours PRN  pantoprazole    Tablet 40 milliGRAM(s) Oral before breakfast  polyethylene glycol 3350 17 Gram(s) Oral daily  risperiDONE   Tablet 1 milliGRAM(s) Oral daily  traZODone 150 milliGRAM(s) Oral at bedtime    Drug Dosing Weight  Height (cm): 172.7 (07 Jul 2021 16:29)  Weight (kg): 94.3 (05 Jul 2021 09:15)  BMI (kg/m2): 31.6 (07 Jul 2021 16:29)  BSA (m2): 2.08 (07 Jul 2021 16:29)    CENTRAL LINE: [ ] YES [ ] NO  LOCATION:         AMOR: [ ] YES [ ] NO          A-LINE:  [ ] YES [ ] NO  LOCATION:             ICU Vital Signs Last 24 Hrs  T(C): 36.2 (09 Jul 2021 08:00), Max: 36.6 (08 Jul 2021 20:00)  T(F): 97.2 (09 Jul 2021 08:00), Max: 97.9 (08 Jul 2021 20:00)  HR: 100 (09 Jul 2021 08:36) (61 - 118)  BP: 125/82 (09 Jul 2021 08:00) (88/50 - 132/69)  BP(mean): 90 (09 Jul 2021 08:00) (57 - 90)  ABP: --  ABP(mean): --  RR: 32 (09 Jul 2021 08:00) (15 - 39)  SpO2: 90% (09 Jul 2021 08:36) (86% - 100%)      ABG - ( 09 Jul 2021 04:22 )  pH, Arterial: 7.35  pH, Blood: x     /  pCO2: 61    /  pO2: 77    / HCO3: 34    / Base Excess: 6.1   /  SaO2: 96                    07-08 @ 07:01  -  07-09 @ 07:00  --------------------------------------------------------  IN: 1610.6 mL / OUT: 4275 mL / NET: -2664.4 mL            REVIEW OF SYSTEMS:    CONSTITUTIONAL: No weakness, fevers or chills  NECK: No pain or stiffness  RESPIRATORY: No cough, wheezing, hemoptysis; No shortness of breath  CARDIOVASCULAR: No chest pain or palpitations  GASTROINTESTINAL: No abdominal or epigastric pain. No nausea, vomiting, No diarrhea or constipation. No melena or hematochezia.  GENITOURINARY: No dysuria, frequency or hematuria  NEUROLOGICAL: No numbness or weakness  All other review of systems is negative unless indicated above      PHYSICAL EXAM:    GENERAL: NAD, well-groomed, well-developed  EYES: EOMI, PERRLA,   NECK: Supple, No JVD; Normal thyroid; Trachea midline  NERVOUS SYSTEM:  Alert & Oriented X3,  Motor Strength 5/5 B/L upper and lower extremities; DTRs 2+ intact and symmetric  CHEST/LUNG: No rales, rhonchi, wheezing   HEART: Regular rate and rhythm; No murmurs,   ABDOMEN: Soft, Nontender, Nondistended; Bowel sounds present  EXTREMITIES:  2+ Peripheral Pulses, No clubbing, cyanosis, or edema        LABS:  CBC Full  -  ( 09 Jul 2021 05:20 )  WBC Count : 8.04 K/uL  RBC Count : 4.64 M/uL  Hemoglobin : 13.2 g/dL  Hematocrit : 41.7 %  Platelet Count - Automated : 170 K/uL  Mean Cell Volume : 89.9 fl  Mean Cell Hemoglobin : 28.4 pg  Mean Cell Hemoglobin Concentration : 31.7 gm/dL  Auto Neutrophil # : 7.64 K/uL  Auto Lymphocyte # : 0.24 K/uL  Auto Monocyte # : 0.16 K/uL  Auto Eosinophil # : 0.00 K/uL  Auto Basophil # : 0.00 K/uL  Auto Neutrophil % : 95.0 %  Auto Lymphocyte % : 3.0 %  Auto Monocyte % : 2.0 %  Auto Eosinophil % : 0.0 %  Auto Basophil % : 0.0 %    07-09    136  |  101  |  25<H>  ----------------------------<  285<H>  4.5   |  30  |  0.57    Ca    8.5      09 Jul 2021 05:20  Phos  3.2     07-09  Mg     2.5     07-09    TPro  6.1  /  Alb  3.1<L>  /  TBili  0.5  /  DBili  x   /  AST  8<L>  /  ALT  30  /  AlkPhos  46  07-09            RADIOLOGY & ADDITIONAL STUDIES REVIEWED:  ***    [ ]GOALS OF CARE DISCUSSION WITH PATIENT/FAMILY/PROXY:    CRITICAL CARE TIME SPENT: 35 minutes INTERVAL HPI/OVERNIGHT EVENTS: More compliant. Tolerated BIPAP from the evening until 2am, otherwise on NC.    PRESSORS: [ ] YES [x ] NO  WHICH:      Antimicrobial:  azithromycin  IVPB      azithromycin  IVPB 500 milliGRAM(s) IV Intermittent every 24 hours    Cardiovascular:    Pulmonary:  albuterol/ipratropium for Nebulization 3 milliLiter(s) Nebulizer every 4 hours    Hematalogic:  aspirin  chewable 81 milliGRAM(s) Oral daily  enoxaparin Injectable 40 milliGRAM(s) SubCutaneous daily    Other:  chlorhexidine 2% Cloths 1 Application(s) Topical <User Schedule>  dexMEDEtomidine Infusion 0.2 MICROgram(s)/kG/Hr IV Continuous <Continuous>  insulin lispro (ADMELOG) corrective regimen sliding scale   SubCutaneous three times a day before meals  insulin lispro (ADMELOG) corrective regimen sliding scale   SubCutaneous at bedtime  melatonin 3 milliGRAM(s) Oral at bedtime  methylPREDNISolone sodium succinate Injectable 40 milliGRAM(s) IV Push every 6 hours  morphine  - Injectable 1 milliGRAM(s) IV Push every 12 hours PRN  multivitamin/minerals 1 Tablet(s) Oral daily  nicotine -   7 mG/24Hr(s) Patch 1 patch Transdermal daily  OLANZapine Injectable 5 milliGRAM(s) IntraMuscular every 12 hours  oxycodone    5 mG/acetaminophen 325 mG 2 Tablet(s) Oral every 6 hours PRN  pantoprazole    Tablet 40 milliGRAM(s) Oral before breakfast  polyethylene glycol 3350 17 Gram(s) Oral daily  risperiDONE   Tablet 1 milliGRAM(s) Oral daily  traZODone 150 milliGRAM(s) Oral at bedtime    albuterol/ipratropium for Nebulization 3 milliLiter(s) Nebulizer every 4 hours  aspirin  chewable 81 milliGRAM(s) Oral daily  azithromycin  IVPB      azithromycin  IVPB 500 milliGRAM(s) IV Intermittent every 24 hours  chlorhexidine 2% Cloths 1 Application(s) Topical <User Schedule>  dexMEDEtomidine Infusion 0.2 MICROgram(s)/kG/Hr IV Continuous <Continuous>  enoxaparin Injectable 40 milliGRAM(s) SubCutaneous daily  insulin lispro (ADMELOG) corrective regimen sliding scale   SubCutaneous three times a day before meals  insulin lispro (ADMELOG) corrective regimen sliding scale   SubCutaneous at bedtime  melatonin 3 milliGRAM(s) Oral at bedtime  methylPREDNISolone sodium succinate Injectable 40 milliGRAM(s) IV Push every 6 hours  morphine  - Injectable 1 milliGRAM(s) IV Push every 12 hours PRN  multivitamin/minerals 1 Tablet(s) Oral daily  nicotine -   7 mG/24Hr(s) Patch 1 patch Transdermal daily  OLANZapine Injectable 5 milliGRAM(s) IntraMuscular every 12 hours  oxycodone    5 mG/acetaminophen 325 mG 2 Tablet(s) Oral every 6 hours PRN  pantoprazole    Tablet 40 milliGRAM(s) Oral before breakfast  polyethylene glycol 3350 17 Gram(s) Oral daily  risperiDONE   Tablet 1 milliGRAM(s) Oral daily  traZODone 150 milliGRAM(s) Oral at bedtime    Drug Dosing Weight  Height (cm): 172.7 (07 Jul 2021 16:29)  Weight (kg): 94.3 (05 Jul 2021 09:15)  BMI (kg/m2): 31.6 (07 Jul 2021 16:29)  BSA (m2): 2.08 (07 Jul 2021 16:29)    CENTRAL LINE: [ ] YES [x ] NO  LOCATION:         AMOR: [ ] YES [x ] NO          A-LINE:  [ ] YES [x ] NO  LOCATION:             ICU Vital Signs Last 24 Hrs  T(C): 36.2 (09 Jul 2021 08:00), Max: 36.6 (08 Jul 2021 20:00)  T(F): 97.2 (09 Jul 2021 08:00), Max: 97.9 (08 Jul 2021 20:00)  HR: 100 (09 Jul 2021 08:36) (61 - 118)  BP: 125/82 (09 Jul 2021 08:00) (88/50 - 132/69)  BP(mean): 90 (09 Jul 2021 08:00) (57 - 90)  ABP: --  ABP(mean): --  RR: 32 (09 Jul 2021 08:00) (15 - 39)  SpO2: 90% (09 Jul 2021 08:36) (86% - 100%)      ABG - ( 09 Jul 2021 04:22 )  pH, Arterial: 7.35  pH, Blood: x     /  pCO2: 61    /  pO2: 77    / HCO3: 34    / Base Excess: 6.1   /  SaO2: 96                    07-08 @ 07:01  -  07-09 @ 07:00  --------------------------------------------------------  IN: 1610.6 mL / OUT: 4275 mL / NET: -2664.4 mL            REVIEW OF SYSTEMS:    CONSTITUTIONAL: No weakness, fevers. Mild lower back pain  NECK: No pain or stiffness  RESPIRATORY: No cough, wheezing, hemoptysis; No shortness of breath  CARDIOVASCULAR: No chest pain or palpitations  GASTROINTESTINAL: No abdominal or epigastric pain. No nausea, vomiting, No diarrhea or constipation.   GENITOURINARY: No dysuria, frequency or hematuria  NEUROLOGICAL: No numbness or weakness  All other review of systems is negative unless indicated above      PHYSICAL EXAM:    GENERAL: NAD  EYES: EOMI, PERRLA,   NECK: Supple, No JVD; Normal thyroid; Trachea midline  NERVOUS SYSTEM:  Alert & Oriented X3,  Motor Strength 5/5 B/L upper and lower extremities;  CHEST/LUNG: No rales, rhonchi, wheezing.   HEART: Regular rate and rhythm; No murmurs,   ABDOMEN: Soft, Nontender, Nondistended; Bowel sounds present  EXTREMITIES:  2+ Peripheral Pulses, No clubbing, cyanosis, or edema        LABS:  CBC Full  -  ( 09 Jul 2021 05:20 )  WBC Count : 8.04 K/uL  RBC Count : 4.64 M/uL  Hemoglobin : 13.2 g/dL  Hematocrit : 41.7 %  Platelet Count - Automated : 170 K/uL  Mean Cell Volume : 89.9 fl  Mean Cell Hemoglobin : 28.4 pg  Mean Cell Hemoglobin Concentration : 31.7 gm/dL  Auto Neutrophil # : 7.64 K/uL  Auto Lymphocyte # : 0.24 K/uL  Auto Monocyte # : 0.16 K/uL  Auto Eosinophil # : 0.00 K/uL  Auto Basophil # : 0.00 K/uL  Auto Neutrophil % : 95.0 %  Auto Lymphocyte % : 3.0 %  Auto Monocyte % : 2.0 %  Auto Eosinophil % : 0.0 %  Auto Basophil % : 0.0 %    07-09    136  |  101  |  25<H>  ----------------------------<  285<H>  4.5   |  30  |  0.57    Ca    8.5      09 Jul 2021 05:20  Phos  3.2     07-09  Mg     2.5     07-09    TPro  6.1  /  Alb  3.1<L>  /  TBili  0.5  /  DBili  x   /  AST  8<L>  /  ALT  30  /  AlkPhos  46  07-09            RADIOLOGY & ADDITIONAL STUDIES REVIEWED:       [ ]GOALS OF CARE DISCUSSION WITH PATIENT/FAMILY/PROXY:    CRITICAL CARE TIME SPENT: 35 minutes

## 2021-07-09 NOTE — PHYSICAL THERAPY INITIAL EVALUATION ADULT - PATIENT/FAMILY/SIGNIFICANT OTHER GOALS STATEMENT, PT EVAL
return back to assisted living facility; be able to perform transfers, ADLs, and ambulation independently while using a rollator

## 2021-07-10 LAB
ALBUMIN SERPL ELPH-MCNC: 3 G/DL — LOW (ref 3.5–5)
ALP SERPL-CCNC: 42 U/L — SIGNIFICANT CHANGE UP (ref 40–120)
ALT FLD-CCNC: 34 U/L DA — SIGNIFICANT CHANGE UP (ref 10–60)
ANION GAP SERPL CALC-SCNC: 7 MMOL/L — SIGNIFICANT CHANGE UP (ref 5–17)
AST SERPL-CCNC: 9 U/L — LOW (ref 10–40)
BASOPHILS # BLD AUTO: 0.02 K/UL — SIGNIFICANT CHANGE UP (ref 0–0.2)
BASOPHILS NFR BLD AUTO: 0.2 % — SIGNIFICANT CHANGE UP (ref 0–2)
BILIRUB SERPL-MCNC: 0.5 MG/DL — SIGNIFICANT CHANGE UP (ref 0.2–1.2)
BUN SERPL-MCNC: 22 MG/DL — HIGH (ref 7–18)
CALCIUM SERPL-MCNC: 8.4 MG/DL — SIGNIFICANT CHANGE UP (ref 8.4–10.5)
CHLORIDE SERPL-SCNC: 104 MMOL/L — SIGNIFICANT CHANGE UP (ref 96–108)
CO2 SERPL-SCNC: 29 MMOL/L — SIGNIFICANT CHANGE UP (ref 22–31)
CREAT SERPL-MCNC: 0.67 MG/DL — SIGNIFICANT CHANGE UP (ref 0.5–1.3)
EOSINOPHIL # BLD AUTO: 0.01 K/UL — SIGNIFICANT CHANGE UP (ref 0–0.5)
EOSINOPHIL NFR BLD AUTO: 0.1 % — SIGNIFICANT CHANGE UP (ref 0–6)
GLUCOSE BLDC GLUCOMTR-MCNC: 273 MG/DL — HIGH (ref 70–99)
GLUCOSE BLDC GLUCOMTR-MCNC: 307 MG/DL — HIGH (ref 70–99)
GLUCOSE BLDC GLUCOMTR-MCNC: 365 MG/DL — HIGH (ref 70–99)
GLUCOSE BLDC GLUCOMTR-MCNC: 396 MG/DL — HIGH (ref 70–99)
GLUCOSE BLDC GLUCOMTR-MCNC: 397 MG/DL — HIGH (ref 70–99)
GLUCOSE SERPL-MCNC: 270 MG/DL — HIGH (ref 70–99)
HCT VFR BLD CALC: 40.7 % — SIGNIFICANT CHANGE UP (ref 39–50)
HGB BLD-MCNC: 12.6 G/DL — LOW (ref 13–17)
IMM GRANULOCYTES NFR BLD AUTO: 0.7 % — SIGNIFICANT CHANGE UP (ref 0–1.5)
LYMPHOCYTES # BLD AUTO: 1.23 K/UL — SIGNIFICANT CHANGE UP (ref 1–3.3)
LYMPHOCYTES # BLD AUTO: 13.9 % — SIGNIFICANT CHANGE UP (ref 13–44)
MAGNESIUM SERPL-MCNC: 2.4 MG/DL — SIGNIFICANT CHANGE UP (ref 1.6–2.6)
MCHC RBC-ENTMCNC: 28 PG — SIGNIFICANT CHANGE UP (ref 27–34)
MCHC RBC-ENTMCNC: 31 GM/DL — LOW (ref 32–36)
MCV RBC AUTO: 90.4 FL — SIGNIFICANT CHANGE UP (ref 80–100)
MONOCYTES # BLD AUTO: 0.77 K/UL — SIGNIFICANT CHANGE UP (ref 0–0.9)
MONOCYTES NFR BLD AUTO: 8.7 % — SIGNIFICANT CHANGE UP (ref 2–14)
NEUTROPHILS # BLD AUTO: 6.74 K/UL — SIGNIFICANT CHANGE UP (ref 1.8–7.4)
NEUTROPHILS NFR BLD AUTO: 76.4 % — SIGNIFICANT CHANGE UP (ref 43–77)
NRBC # BLD: 0 /100 WBCS — SIGNIFICANT CHANGE UP (ref 0–0)
PHOSPHATE SERPL-MCNC: 2.8 MG/DL — SIGNIFICANT CHANGE UP (ref 2.5–4.5)
PLATELET # BLD AUTO: 154 K/UL — SIGNIFICANT CHANGE UP (ref 150–400)
POTASSIUM SERPL-MCNC: 4 MMOL/L — SIGNIFICANT CHANGE UP (ref 3.5–5.3)
POTASSIUM SERPL-SCNC: 4 MMOL/L — SIGNIFICANT CHANGE UP (ref 3.5–5.3)
PROT SERPL-MCNC: 5.7 G/DL — LOW (ref 6–8.3)
RBC # BLD: 4.5 M/UL — SIGNIFICANT CHANGE UP (ref 4.2–5.8)
RBC # FLD: 13.2 % — SIGNIFICANT CHANGE UP (ref 10.3–14.5)
SODIUM SERPL-SCNC: 140 MMOL/L — SIGNIFICANT CHANGE UP (ref 135–145)
WBC # BLD: 8.83 K/UL — SIGNIFICANT CHANGE UP (ref 3.8–10.5)
WBC # FLD AUTO: 8.83 K/UL — SIGNIFICANT CHANGE UP (ref 3.8–10.5)

## 2021-07-10 RX ADMIN — Medication 5: at 18:06

## 2021-07-10 RX ADMIN — Medication 5: at 12:58

## 2021-07-10 RX ADMIN — AZITHROMYCIN 500 MILLIGRAM(S): 500 TABLET, FILM COATED ORAL at 12:58

## 2021-07-10 RX ADMIN — OXYCODONE AND ACETAMINOPHEN 2 TABLET(S): 5; 325 TABLET ORAL at 21:00

## 2021-07-10 RX ADMIN — Medication 1 PATCH: at 19:19

## 2021-07-10 RX ADMIN — OLANZAPINE 5 MILLIGRAM(S): 15 TABLET, FILM COATED ORAL at 05:31

## 2021-07-10 RX ADMIN — Medication 1 PATCH: at 06:15

## 2021-07-10 RX ADMIN — Medication 2: at 22:20

## 2021-07-10 RX ADMIN — OXYCODONE AND ACETAMINOPHEN 2 TABLET(S): 5; 325 TABLET ORAL at 20:02

## 2021-07-10 RX ADMIN — Medication 1 PATCH: at 14:03

## 2021-07-10 RX ADMIN — Medication 3 MILLILITER(S): at 04:18

## 2021-07-10 RX ADMIN — Medication 3 MILLILITER(S): at 08:37

## 2021-07-10 RX ADMIN — Medication 3 MILLILITER(S): at 20:47

## 2021-07-10 RX ADMIN — Medication 40 MILLIGRAM(S): at 05:31

## 2021-07-10 RX ADMIN — Medication 1 TABLET(S): at 12:59

## 2021-07-10 RX ADMIN — CHLORHEXIDINE GLUCONATE 1 APPLICATION(S): 213 SOLUTION TOPICAL at 05:33

## 2021-07-10 RX ADMIN — ENOXAPARIN SODIUM 40 MILLIGRAM(S): 100 INJECTION SUBCUTANEOUS at 12:58

## 2021-07-10 RX ADMIN — Medication 3: at 07:40

## 2021-07-10 RX ADMIN — OXYCODONE AND ACETAMINOPHEN 2 TABLET(S): 5; 325 TABLET ORAL at 12:57

## 2021-07-10 RX ADMIN — Medication 3 MILLILITER(S): at 17:14

## 2021-07-10 RX ADMIN — Medication 150 MILLIGRAM(S): at 22:14

## 2021-07-10 RX ADMIN — OXYCODONE AND ACETAMINOPHEN 2 TABLET(S): 5; 325 TABLET ORAL at 10:50

## 2021-07-10 RX ADMIN — Medication 1 PATCH: at 13:41

## 2021-07-10 RX ADMIN — Medication 3 MILLIGRAM(S): at 22:14

## 2021-07-10 RX ADMIN — PANTOPRAZOLE SODIUM 40 MILLIGRAM(S): 20 TABLET, DELAYED RELEASE ORAL at 05:31

## 2021-07-10 RX ADMIN — RISPERIDONE 1 MILLIGRAM(S): 4 TABLET ORAL at 13:42

## 2021-07-10 RX ADMIN — Medication 81 MILLIGRAM(S): at 12:58
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
## 2021-07-11 ENCOUNTER — TRANSCRIPTION ENCOUNTER (OUTPATIENT)
Age: 61
End: 2021-07-11

## 2021-07-11 LAB
ALBUMIN SERPL ELPH-MCNC: 2.9 G/DL — LOW (ref 3.5–5)
ALP SERPL-CCNC: 45 U/L — SIGNIFICANT CHANGE UP (ref 40–120)
ALT FLD-CCNC: 33 U/L DA — SIGNIFICANT CHANGE UP (ref 10–60)
ANION GAP SERPL CALC-SCNC: 6 MMOL/L — SIGNIFICANT CHANGE UP (ref 5–17)
AST SERPL-CCNC: 9 U/L — LOW (ref 10–40)
BASOPHILS # BLD AUTO: 0.01 K/UL — SIGNIFICANT CHANGE UP (ref 0–0.2)
BASOPHILS NFR BLD AUTO: 0.1 % — SIGNIFICANT CHANGE UP (ref 0–2)
BILIRUB SERPL-MCNC: 0.7 MG/DL — SIGNIFICANT CHANGE UP (ref 0.2–1.2)
BUN SERPL-MCNC: 22 MG/DL — HIGH (ref 7–18)
CALCIUM SERPL-MCNC: 8.4 MG/DL — SIGNIFICANT CHANGE UP (ref 8.4–10.5)
CHLORIDE SERPL-SCNC: 104 MMOL/L — SIGNIFICANT CHANGE UP (ref 96–108)
CO2 SERPL-SCNC: 30 MMOL/L — SIGNIFICANT CHANGE UP (ref 22–31)
CREAT SERPL-MCNC: 0.62 MG/DL — SIGNIFICANT CHANGE UP (ref 0.5–1.3)
EOSINOPHIL # BLD AUTO: 0.09 K/UL — SIGNIFICANT CHANGE UP (ref 0–0.5)
EOSINOPHIL NFR BLD AUTO: 1 % — SIGNIFICANT CHANGE UP (ref 0–6)
GLUCOSE BLDC GLUCOMTR-MCNC: 221 MG/DL — HIGH (ref 70–99)
GLUCOSE BLDC GLUCOMTR-MCNC: 330 MG/DL — HIGH (ref 70–99)
GLUCOSE BLDC GLUCOMTR-MCNC: 350 MG/DL — HIGH (ref 70–99)
GLUCOSE BLDC GLUCOMTR-MCNC: 391 MG/DL — HIGH (ref 70–99)
GLUCOSE SERPL-MCNC: 186 MG/DL — HIGH (ref 70–99)
HCT VFR BLD CALC: 44.1 % — SIGNIFICANT CHANGE UP (ref 39–50)
HGB BLD-MCNC: 13.5 G/DL — SIGNIFICANT CHANGE UP (ref 13–17)
IMM GRANULOCYTES NFR BLD AUTO: 0.6 % — SIGNIFICANT CHANGE UP (ref 0–1.5)
LYMPHOCYTES # BLD AUTO: 1.91 K/UL — SIGNIFICANT CHANGE UP (ref 1–3.3)
LYMPHOCYTES # BLD AUTO: 20.3 % — SIGNIFICANT CHANGE UP (ref 13–44)
MAGNESIUM SERPL-MCNC: 2.2 MG/DL — SIGNIFICANT CHANGE UP (ref 1.6–2.6)
MCHC RBC-ENTMCNC: 28 PG — SIGNIFICANT CHANGE UP (ref 27–34)
MCHC RBC-ENTMCNC: 30.6 GM/DL — LOW (ref 32–36)
MCV RBC AUTO: 91.5 FL — SIGNIFICANT CHANGE UP (ref 80–100)
MONOCYTES # BLD AUTO: 0.76 K/UL — SIGNIFICANT CHANGE UP (ref 0–0.9)
MONOCYTES NFR BLD AUTO: 8.1 % — SIGNIFICANT CHANGE UP (ref 2–14)
NEUTROPHILS # BLD AUTO: 6.57 K/UL — SIGNIFICANT CHANGE UP (ref 1.8–7.4)
NEUTROPHILS NFR BLD AUTO: 69.9 % — SIGNIFICANT CHANGE UP (ref 43–77)
NRBC # BLD: 0 /100 WBCS — SIGNIFICANT CHANGE UP (ref 0–0)
PHOSPHATE SERPL-MCNC: 3.2 MG/DL — SIGNIFICANT CHANGE UP (ref 2.5–4.5)
PLATELET # BLD AUTO: 141 K/UL — LOW (ref 150–400)
POTASSIUM SERPL-MCNC: 4.1 MMOL/L — SIGNIFICANT CHANGE UP (ref 3.5–5.3)
POTASSIUM SERPL-SCNC: 4.1 MMOL/L — SIGNIFICANT CHANGE UP (ref 3.5–5.3)
PROT SERPL-MCNC: 5.8 G/DL — LOW (ref 6–8.3)
RBC # BLD: 4.82 M/UL — SIGNIFICANT CHANGE UP (ref 4.2–5.8)
RBC # FLD: 13.3 % — SIGNIFICANT CHANGE UP (ref 10.3–14.5)
SARS-COV-2 RNA SPEC QL NAA+PROBE: SIGNIFICANT CHANGE UP
SODIUM SERPL-SCNC: 140 MMOL/L — SIGNIFICANT CHANGE UP (ref 135–145)
WBC # BLD: 9.4 K/UL — SIGNIFICANT CHANGE UP (ref 3.8–10.5)
WBC # FLD AUTO: 9.4 K/UL — SIGNIFICANT CHANGE UP (ref 3.8–10.5)

## 2021-07-11 RX ADMIN — Medication 2: at 08:06

## 2021-07-11 RX ADMIN — Medication 1 PATCH: at 19:09

## 2021-07-11 RX ADMIN — OXYCODONE AND ACETAMINOPHEN 2 TABLET(S): 5; 325 TABLET ORAL at 23:22

## 2021-07-11 RX ADMIN — Medication 81 MILLIGRAM(S): at 11:56

## 2021-07-11 RX ADMIN — OXYCODONE AND ACETAMINOPHEN 2 TABLET(S): 5; 325 TABLET ORAL at 10:10

## 2021-07-11 RX ADMIN — Medication 3: at 22:13

## 2021-07-11 RX ADMIN — Medication 4: at 11:51

## 2021-07-11 RX ADMIN — Medication 3 MILLIGRAM(S): at 21:29

## 2021-07-11 RX ADMIN — OXYCODONE AND ACETAMINOPHEN 2 TABLET(S): 5; 325 TABLET ORAL at 17:13

## 2021-07-11 RX ADMIN — Medication 1 PATCH: at 11:56

## 2021-07-11 RX ADMIN — OXYCODONE AND ACETAMINOPHEN 2 TABLET(S): 5; 325 TABLET ORAL at 17:53

## 2021-07-11 RX ADMIN — Medication 3 MILLILITER(S): at 09:07

## 2021-07-11 RX ADMIN — Medication 1 PATCH: at 13:53

## 2021-07-11 RX ADMIN — Medication 40 MILLIGRAM(S): at 06:37

## 2021-07-11 RX ADMIN — Medication 150 MILLIGRAM(S): at 21:29

## 2021-07-11 RX ADMIN — Medication 3 MILLILITER(S): at 04:01

## 2021-07-11 RX ADMIN — Medication 1 TABLET(S): at 11:57

## 2021-07-11 RX ADMIN — RISPERIDONE 1 MILLIGRAM(S): 4 TABLET ORAL at 11:53

## 2021-07-11 RX ADMIN — Medication 1 PATCH: at 07:10

## 2021-07-11 RX ADMIN — ENOXAPARIN SODIUM 40 MILLIGRAM(S): 100 INJECTION SUBCUTANEOUS at 11:52

## 2021-07-11 RX ADMIN — Medication 3 MILLILITER(S): at 17:36

## 2021-07-11 RX ADMIN — AZITHROMYCIN 500 MILLIGRAM(S): 500 TABLET, FILM COATED ORAL at 11:52

## 2021-07-11 RX ADMIN — OLANZAPINE 5 MILLIGRAM(S): 15 TABLET, FILM COATED ORAL at 06:35

## 2021-07-11 RX ADMIN — OLANZAPINE 5 MILLIGRAM(S): 15 TABLET, FILM COATED ORAL at 21:30

## 2021-07-11 RX ADMIN — PANTOPRAZOLE SODIUM 40 MILLIGRAM(S): 20 TABLET, DELAYED RELEASE ORAL at 06:37

## 2021-07-11 RX ADMIN — Medication 3 MILLILITER(S): at 20:39

## 2021-07-11 RX ADMIN — Medication 4: at 17:09

## 2021-07-11 RX ADMIN — OXYCODONE AND ACETAMINOPHEN 2 TABLET(S): 5; 325 TABLET ORAL at 09:48

## 2021-07-11 RX ADMIN — CHLORHEXIDINE GLUCONATE 1 APPLICATION(S): 213 SOLUTION TOPICAL at 06:42

## 2021-07-11 NOTE — DISCHARGE NOTE PROVIDER - NSDCMRMEDTOKEN_GEN_ALL_CORE_FT
ALPRAZolam 0.5 mg oral tablet: 0.5 milligram(s) orally once a day  Artificial Tears ophthalmic solution: 1 dose(s) to each affected eye once a day  aspirin 81 mg oral tablet: 1 tab(s) orally once a day  clotrimazole 1% topical cream: Apply topically to affected area once a day  Diflucan 100 mg oral tablet: 1 tab(s) orally once a day  DuoNeb 0.5 mg-2.5 mg/3 mL inhalation solution: 1 scoop(s) inhaled  Eucerin topical lotion: Apply topically to affected area once a day  furosemide 20 mg oral tablet: 1 tab(s) orally once a day  insulin regular:   lisinopril:   Melatonin 3 mg oral tablet: 1 tab(s) orally once a day (at bedtime)  MiraLax oral powder for reconstitution:   multivitamin with minerals:   NovoLIN 70/30 subcutaneous suspension: 1  subcutaneous 2 times a day  nystatin 100,000 units/mL oral suspension: 4 milliliter(s) orally 4 times a day  pantoprazole 40 mg oral delayed release tablet: 1 tab(s) orally once a day  Percocet 10/325 oral tablet: 1 tab(s) orally every 6 hours  predniSONE 10 mg oral tablet: 1 tab(s) orally once a day  ProAir Digihaler 90 mcg/inh inhalation powder: 2 puff(s) inhaled every 6 hours  RisperDAL 1 mg oral tablet: 1 tab(s) orally once a day  simethicone 80 mg oral tablet: 1 tab(s) orally 4 times a day (after meals and at bedtime)  Symbicort 160 mcg-4.5 mcg/inh inhalation aerosol: 2 puff(s) inhaled 2 times a day  traZODone 150 mg oral tablet: 1 tab(s) orally once a day (at bedtime)   ALPRAZolam 0.5 mg oral tablet: 0.5 milligram(s) orally once a day  Artificial Tears ophthalmic solution: 1 dose(s) to each affected eye once a day  aspirin 81 mg oral tablet: 1 tab(s) orally once a day  clotrimazole 1% topical cream: Apply topically to affected area once a day  Diflucan 100 mg oral tablet: 1 tab(s) orally once a day  DuoNeb 0.5 mg-2.5 mg/3 mL inhalation solution: 1 scoop(s) inhaled  Eucerin topical lotion: Apply topically to affected area once a day  furosemide 20 mg oral tablet: 1 tab(s) orally once a day  insulin regular:   lisinopril:   Melatonin 3 mg oral tablet: 1 tab(s) orally once a day (at bedtime)  MiraLax oral powder for reconstitution:   multivitamin with minerals:   NovoLIN 70/30 subcutaneous suspension: 1  subcutaneous 2 times a day  nystatin 100,000 units/mL oral suspension: 4 milliliter(s) orally 4 times a day  pantoprazole 40 mg oral delayed release tablet: 1 tab(s) orally once a day  Percocet 10/325 oral tablet: 1 tab(s) orally every 6 hours  predniSONE 10 mg oral tablet: 1 tab(s) orally once a day  ProAir Digihaler 90 mcg/inh inhalation powder: 2 puff(s) inhaled every 6 hours  RisperDAL 1 mg oral tablet: 1 tab(s) orally once a day  simethicone 80 mg oral tablet: 1 tab(s) orally 4 times a day (after meals and at bedtime)  Symbicort 160 mcg-4.5 mcg/inh inhalation aerosol: 2 puff(s) inhaled 2 times a day  traZODone 150 mg oral tablet: 1 tab(s) orally once a day (at bedtime)  Trilogy: 99 months  Tidal volume 400  Max pressure 23  Min pressure 8  Back up rate 12  Blend in 2LPM oxygen  J44.9  To be used nightly and as needed during the day   Artificial Tears ophthalmic solution: 1 dose(s) to each affected eye once a day  aspirin 81 mg oral tablet: 1 tab(s) orally once a day  DuoNeb 0.5 mg-2.5 mg/3 mL inhalation solution: 1 scoop(s) inhaled  Eucerin topical lotion: Apply topically to affected area once a day  Melatonin 3 mg oral tablet: 1 tab(s) orally once a day (at bedtime)  MiraLax oral powder for reconstitution:   multivitamin with minerals:   NovoLIN 70/30 subcutaneous suspension: 1  subcutaneous 2 times a day  pantoprazole 40 mg oral delayed release tablet: 1 tab(s) orally once a day  Percocet 10/325 oral tablet: 1 tab(s) orally every 6 hours  ProAir Digihaler 90 mcg/inh inhalation powder: 2 puff(s) inhaled every 6 hours  RisperDAL 1 mg oral tablet: 1 tab(s) orally once a day  simethicone 80 mg oral tablet: 1 tab(s) orally 4 times a day (after meals and at bedtime)  Symbicort 160 mcg-4.5 mcg/inh inhalation aerosol: 2 puff(s) inhaled 2 times a day  traZODone 150 mg oral tablet: 1 tab(s) orally once a day (at bedtime)  Trilogy: 99 months  Tidal volume 400  Max pressure 23  Min pressure 8  Back up rate 12  Blend in 2LPM oxygen  J44.9  To be used nightly and as needed during the day   ALPRAZolam 0.5 mg oral tablet: 1 tab(s) orally 2 times a day MDD:2  Artificial Tears ophthalmic solution: 1 dose(s) to each affected eye once a day  aspirin 81 mg oral tablet: 1 tab(s) orally once a day  DuoNeb 0.5 mg-2.5 mg/3 mL inhalation solution: 1 scoop(s) inhaled  Eucerin topical lotion: Apply topically to affected area once a day  furosemide 20 mg oral tablet: 1 tab(s) orally once a day  lidocaine 5% topical film: Apply topically to affected area once a day MDD:1  Melatonin 3 mg oral tablet: 1 tab(s) orally once a day (at bedtime)  MiraLax oral powder for reconstitution:   multivitamin with minerals:   nicotine 7 mg/24 hr transdermal film, extended release: 1 patch transdermal once a day   NovoLIN 70/30 subcutaneous suspension: 1  subcutaneous 2 times a day  pantoprazole 40 mg oral delayed release tablet: 1 tab(s) orally once a day  Percocet 10/325 oral tablet: 1 tab(s) orally every 6 hours  predniSONE 10 mg oral tablet: 1 tab(s) orally once a day  predniSONE 20 mg oral tablet: 1 tab(s) orally once a day  ProAir Digihaler 90 mcg/inh inhalation powder: 2 puff(s) inhaled every 6 hours  RisperDAL 1 mg oral tablet: 1 tab(s) orally once a day  simethicone 80 mg oral tablet: 1 tab(s) orally 4 times a day (after meals and at bedtime)  Symbicort 160 mcg-4.5 mcg/inh inhalation aerosol: 2 puff(s) inhaled 2 times a day  tiotropium 18 mcg inhalation capsule: 1 cap(s) inhaled once a day  traZODone 150 mg oral tablet: 1 tab(s) orally once a day (at bedtime)  Trilogy: 99 months  Tidal volume 400  Max pressure 23  Min pressure 8  Back up rate 12  Blend in 2LPM oxygen  J44.9  To be used nightly and as needed during the day

## 2021-07-11 NOTE — DISCHARGE NOTE PROVIDER - PROVIDER TOKENS
PROVIDER:[TOKEN:[[id number]],FOLLOWUP:[1 week]] PROVIDER:[TOKEN:[[id number]],FOLLOWUP:[1 week]],PROVIDER:[TOKEN:[[id number]],FOLLOWUP:[1 week]]

## 2021-07-11 NOTE — DISCHARGE NOTE PROVIDER - NSDCCPCAREPLAN_GEN_ALL_CORE_FT
PRINCIPAL DISCHARGE DIAGNOSIS  Diagnosis: Acute on chronic respiratory failure with hypoxia and hypercapnia  Assessment and Plan of Treatment: you presented to hospital for shortness of breath, you were retaing Co2 likely due to COPD exacerbation. you were placed on Bipap mask and were monitored in ICU. you were evaluated by Pulmonolist and were also started on IV steroids, your breathing improved and remained stable      Continue to wear your oxygen   Call your Health Care provider upon arrival home to make a follow up appointment within one week.  Take all inhalers as prescribed by your Health Care Provider.  Take steroids as prescribed by your Health Care Provider.  If your cough increases infrequency and severity and/or you have shortness of breath or increased shortness of breath call your Health Care Provider.  If you develop fever, chills, night sweats, malaise, and/or change in mental status call your Health care Provider.  Nutrition is very important.  Eat small frequent meals.  Increase your activity as tolerated.  Do not stay in bed all day        SECONDARY DISCHARGE DIAGNOSES  Diagnosis: DM (diabetes mellitus)  Assessment and Plan of Treatment: HgA1C this admission 6.5  Make sure you get your HgA1c checked every three months.  If you take oral diabetes medications, check your blood glucose two times a day.  It's important not to skip any meals.  Keep a log of your blood glucose results and always take it with you to your doctor appointments.  Keep a list of your current medications including injectables and over the counter medications and bring this medication list with you to all your doctor appointments.  If you have not seen your ophthalmologist this year call for appointment.  Check your feet daily for redness, sores, or openings. Do not self treat.      Diagnosis: Bipolar 1 disorder  Assessment and Plan of Treatment: SEEK IMMEDIATE CARE IF  You have thoughts about hurting yourself or others.  You lose touch with reality (have psychotic symptoms).  You are taking medicine for depression and have a serious side effect      Diagnosis: Acute encephalopathy  Assessment and Plan of Treatment: you had changes in mental status likely due to CO2 retention from COPD exacerbation   Your mental status improved and remained stable   Follow direstions in problem #1     PRINCIPAL DISCHARGE DIAGNOSIS  Diagnosis: Acute on chronic respiratory failure with hypoxia and hypercapnia  Assessment and Plan of Treatment: you presented to hospital for shortness of breath, you were retaing Co2 likely due to COPD exacerbation. you were placed on Bipap mask and were monitored in ICU. you were evaluated by Pulmonolist and were also started on IV steroids, your breathing improved and remained stable      Continue to wear your oxygen, BIPAP at night.   Call your Health Care provider (PMD and pulmonary doctor ) upon arrival home to make a follow up appointment within one week.   Take all inhalers as prescribed by your Health Care Provider.  Take steroids as prescribed by your Health Care Provider.  If your cough increases infrequency and severity and/or you have shortness of breath or increased shortness of breath call your Health Care Provider.  If you develop fever, chills, night sweats, malaise, and/or change in mental status call your Health care Provider.  Nutrition is very important.  Eat small frequent meals.  Increase your activity as tolerated.  Do not stay in bed all day        SECONDARY DISCHARGE DIAGNOSES  Diagnosis: Acute encephalopathy  Assessment and Plan of Treatment: you had changes in mental status likely due to CO2 retention from COPD exacerbation   Your mental status improved and remained stable   Follow direstions in problem #1    Diagnosis: DM (diabetes mellitus)  Assessment and Plan of Treatment: HgA1C this admission 6.5  Make sure you get your HgA1c checked every three months.  If you take oral diabetes medications, check your blood glucose two times a day.  It's important not to skip any meals.  Keep a log of your blood glucose results and always take it with you to your doctor appointments.  Keep a list of your current medications including injectables and over the counter medications and bring this medication list with you to all your doctor appointments.  If you have not seen your ophthalmologist this year call for appointment.  Check your feet daily for redness, sores, or openings. Do not self treat.      Diagnosis: Bipolar 1 disorder  Assessment and Plan of Treatment: SEEK IMMEDIATE CARE IF  You have thoughts about hurting yourself or others.  You lose touch with reality (have psychotic symptoms).  You are taking medicine for depression and have a serious side effect       PRINCIPAL DISCHARGE DIAGNOSIS  Diagnosis: Acute on chronic respiratory failure with hypoxia and hypercapnia  Assessment and Plan of Treatment: you presented to hospital for shortness of breath, you were retaing Co2 likely due to COPD exacerbation. you were placed on Bipap mask and were monitored in ICU. you were evaluated by Pulmonolist and were also started on IV steroids, your breathing improved and remained stable      Continue to wear your oxygen, BIPAP at night.   Call your Health Care provider (PMD and pulmonary doctor ) upon arrival home to make a follow up appointment within one week.   Take all inhalers as prescribed by your Health Care Provider.  Take steroids as prescribed by your Health Care Provider.  If your cough increases infrequency and severity and/or you have shortness of breath or increased shortness of breath call your Health Care Provider.  If you develop fever, chills, night sweats, malaise, and/or change in mental status call your Health care Provider.  Nutrition is very important.  Eat small frequent meals.  Increase your activity as tolerated.  Do not stay in bed all day        SECONDARY DISCHARGE DIAGNOSES  Diagnosis: Acute encephalopathy  Assessment and Plan of Treatment: you had changes in mental status likely due to CO2 retention from COPD exacerbation   Your mental status improved and remained stable   Follow direstions in problem #1    Diagnosis: DM (diabetes mellitus)  Assessment and Plan of Treatment: HgA1C this admission 6.5  Make sure you get your HgA1c checked every three months.  If you take oral diabetes medications, check your blood glucose two times a day.  It's important not to skip any meals.  Keep a log of your blood glucose results and always take it with you to your doctor appointments.  Keep a list of your current medications including injectables and over the counter medications and bring this medication list with you to all your doctor appointments.  If you have not seen your ophthalmologist this year call for appointment.  Check your feet daily for redness, sores, or openings. Do not self treat.      Diagnosis: Bipolar 1 disorder  Assessment and Plan of Treatment: continue your medication as prescribed  Please seek medical help if you have thoughts about hurting yourself or others. You are taking medicine for depression and have a serious side effect      Diagnosis: HTN (hypertension)  Assessment and Plan of Treatment: You were blood pressures well controlled.   Low salt diet  Activity as tolerated.  Take all medication as prescribed by your facility doctor.  Follow up with your medical doctor for routine blood pressure monitoring at your next visit.  Notify your doctor if you have any of the following symptoms:   Dizziness, Lightheadedness, Blurry vision, Headache, Chest pain, Shortness of breath       PRINCIPAL DISCHARGE DIAGNOSIS  Diagnosis: Acute on chronic respiratory failure with hypoxia and hypercapnia  Assessment and Plan of Treatment: you presented to hospital for shortness of breath, you were retaing Co2 likely due to COPD exacerbation. you were placed on Bipap mask and were monitored in ICU. you were evaluated by Pulmonolist and were also started on IV steroids, your breathing improved and remained stable   Prednisone taper:  Take prednisone 20mg PO daily till 07/17 and stop  Take prednisone 10mg PO daily on 07/18 till 07/20 and stop.     Continue to wear your oxygen, BIPAP at night.   Call your Health Care provider (PMD and pulmonary doctor ) upon arrival home to make a follow up appointment within one week.   Take all inhalers as prescribed by your Health Care Provider.  Take steroids as prescribed by your Health Care Provider.  If your cough increases infrequency and severity and/or you have shortness of breath or increased shortness of breath call your Health Care Provider.  If you develop fever, chills, night sweats, malaise, and/or change in mental status call your Health care Provider.  Nutrition is very important.  Eat small frequent meals.  Increase your activity as tolerated.  Do not stay in bed all day        SECONDARY DISCHARGE DIAGNOSES  Diagnosis: Acute encephalopathy  Assessment and Plan of Treatment: you had changes in mental status likely due to CO2 retention from COPD exacerbation   Your mental status improved and remained stable   Follow direstions in problem #1    Diagnosis: DM (diabetes mellitus)  Assessment and Plan of Treatment: HgA1C this admission 6.5  Make sure you get your HgA1c checked every three months.  If you take oral diabetes medications, check your blood glucose two times a day.  It's important not to skip any meals.  Keep a log of your blood glucose results and always take it with you to your doctor appointments.  Keep a list of your current medications including injectables and over the counter medications and bring this medication list with you to all your doctor appointments.  If you have not seen your ophthalmologist this year call for appointment.  Check your feet daily for redness, sores, or openings. Do not self treat.      Diagnosis: Bipolar 1 disorder  Assessment and Plan of Treatment: continue your medication as prescribed  Please seek medical help if you have thoughts about hurting yourself or others. You are taking medicine for depression and have a serious side effect      Diagnosis: HTN (hypertension)  Assessment and Plan of Treatment: You were blood pressures well controlled.   Low salt diet  Activity as tolerated.  Take all medication as prescribed by your facility doctor.  Follow up with your medical doctor for routine blood pressure monitoring at your next visit.  Notify your doctor if you have any of the following symptoms:   Dizziness, Lightheadedness, Blurry vision, Headache, Chest pain, Shortness of breath

## 2021-07-11 NOTE — DISCHARGE NOTE PROVIDER - HOSPITAL COURSE
60 YOM with a h/o COPD on 2L home O2, CAD with stent, CHF, DM, GERD, HLD, pHTN,  asthma, anxiety, and panic attacks was brought in by ambulance from his assisted living facility for shortness of breath was admitted for COPD exacerbation. He was found to be encephalopathic, had co2 retention. Patient is being transferred to ICU for respiratory failure requiring BiPAP.  Patient was admitted to the ICU due to acute hypoxic respiratory failure 2/2 COPD exacerbation. He was placed on BiPAP and high flow oxygen, but he is generally noncompliant with BiPAP therapy. He was treated with duoneb, solumedrol and azithromycin. Patient was hemodynamically stable and was saturating well on 2L NC and on BIPAP at night. pt was transferred to medicine   Tejada removed and pt voiding without difficulty   Pt on baseline 2L O2, medically optimized for discharge back to facility with out pt follow up   Please note that this a brief summary of hospital course please refer to daily progress notes and consult notes for full course and events    60 YOM with a h/o COPD on 2L home O2, CAD with stent, CHF, DM, GERD, HLD, pHTN,  asthma, anxiety, and panic attacks was brought in by ambulance from his assisted living facility for shortness of breath was admitted for COPD exacerbation. He was found to be encephalopathic, had co2 retention. Patient is being transferred to ICU for respiratory failure requiring BiPAP.  Patient was admitted to the ICU due to acute hypoxic respiratory failure 2/2 COPD exacerbation. He was placed on BiPAP and high flow oxygen, but he is generally noncompliant with BiPAP therapy. He was treated with duoneb, solumedrol and azithromycin. Patient was hemodynamically stable and was saturating well on 2L NC and on BIPAP at night. pt was transferred to medicine. Tejada removed and pt voiding without difficulty. Pt on baseline 2L O2, tolerating well with oxygen therapy without respiratory symptoms. BIPAP ordered for nocturnal use at facility.   Patient is medically optimized for discharge back to facility with out pt follow up.    Please note that this a brief summary of hospital course please refer to daily progress notes and consult notes for full course and events.   60 YOM with a h/o COPD on 2L home O2, CAD with stent, CHF, DM, GERD, HLD, pHTN,  asthma, anxiety, and panic attacks was brought in by ambulance from his assisted living facility for shortness of breath was admitted for COPD exacerbation. He was found to be encephalopathic, had co2 retention. Patient is being transferred to ICU for respiratory failure requiring BiPAP.  Patient was admitted to the ICU due to acute hypoxic respiratory failure 2/2 COPD exacerbation. He was placed on BiPAP and high flow oxygen, but he is generally noncompliant with BiPAP therapy. He was treated with duoneb, solumedrol and azithromycin. Patient was hemodynamically stable and was saturating well on 2L NC and on BIPAP at night. pt was transferred to medicine. Tejada removed and pt voiding without difficulty. Pt on baseline 2L O2, tolerating well with oxygen therapy without respiratory symptoms. BIPAP ordered for nocturnal use at facility. Prednisone taper:  Take prednisone 20mg PO daily till 07/17 and stop  Take prednisone 10mg PO daily on 07/18 till 07/20 and stop.    Given patient's improved clinical status and current hemodynamic stability, decision was made to discharge.  Please refer to patient's complete medical chart with documents for a full hospital course, for this is only a brief summary.
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
## 2021-07-11 NOTE — DISCHARGE NOTE PROVIDER - CARE PROVIDER_API CALL
Raman Ramos)  Medicine  [address] [place] [address]  Phone: [phone]  Fax: [phone]  Follow Up Time: 1 week   Raman Ramos)  Medicine  [address] [place] [address]  Phone: [phone]  Fax: [phone]  Follow Up Time: 1 week    Forrest Marquez)  Medicine  Dept Director  [facility], [address]  [place] [address]  Phone: [phone]  Fax: [phone]  Follow Up Time: 1 week

## 2021-07-12 DIAGNOSIS — Z71.89 OTHER SPECIFIED COUNSELING: ICD-10-CM

## 2021-07-12 DIAGNOSIS — J96.21 ACUTE AND CHRONIC RESPIRATORY FAILURE WITH HYPOXIA: ICD-10-CM

## 2021-07-12 DIAGNOSIS — Z02.9 ENCOUNTER FOR ADMINISTRATIVE EXAMINATIONS, UNSPECIFIED: ICD-10-CM

## 2021-07-12 DIAGNOSIS — I25.10 ATHEROSCLEROTIC HEART DISEASE OF NATIVE CORONARY ARTERY WITHOUT ANGINA PECTORIS: ICD-10-CM

## 2021-07-12 LAB
ALBUMIN SERPL ELPH-MCNC: 3.1 G/DL — LOW (ref 3.5–5)
ALP SERPL-CCNC: 48 U/L — SIGNIFICANT CHANGE UP (ref 40–120)
ALT FLD-CCNC: 50 U/L DA — SIGNIFICANT CHANGE UP (ref 10–60)
ANION GAP SERPL CALC-SCNC: 5 MMOL/L — SIGNIFICANT CHANGE UP (ref 5–17)
AST SERPL-CCNC: 10 U/L — SIGNIFICANT CHANGE UP (ref 10–40)
BASOPHILS # BLD AUTO: 0.02 K/UL — SIGNIFICANT CHANGE UP (ref 0–0.2)
BASOPHILS NFR BLD AUTO: 0.2 % — SIGNIFICANT CHANGE UP (ref 0–2)
BILIRUB SERPL-MCNC: 0.6 MG/DL — SIGNIFICANT CHANGE UP (ref 0.2–1.2)
BUN SERPL-MCNC: 19 MG/DL — HIGH (ref 7–18)
CALCIUM SERPL-MCNC: 8.4 MG/DL — SIGNIFICANT CHANGE UP (ref 8.4–10.5)
CHLORIDE SERPL-SCNC: 100 MMOL/L — SIGNIFICANT CHANGE UP (ref 96–108)
CO2 SERPL-SCNC: 32 MMOL/L — HIGH (ref 22–31)
CREAT SERPL-MCNC: 0.62 MG/DL — SIGNIFICANT CHANGE UP (ref 0.5–1.3)
EOSINOPHIL # BLD AUTO: 0.11 K/UL — SIGNIFICANT CHANGE UP (ref 0–0.5)
EOSINOPHIL NFR BLD AUTO: 1 % — SIGNIFICANT CHANGE UP (ref 0–6)
GLUCOSE BLDC GLUCOMTR-MCNC: 201 MG/DL — HIGH (ref 70–99)
GLUCOSE BLDC GLUCOMTR-MCNC: 220 MG/DL — HIGH (ref 70–99)
GLUCOSE BLDC GLUCOMTR-MCNC: 274 MG/DL — HIGH (ref 70–99)
GLUCOSE BLDC GLUCOMTR-MCNC: 374 MG/DL — HIGH (ref 70–99)
GLUCOSE SERPL-MCNC: 204 MG/DL — HIGH (ref 70–99)
HCT VFR BLD CALC: 43.4 % — SIGNIFICANT CHANGE UP (ref 39–50)
HGB BLD-MCNC: 13.5 G/DL — SIGNIFICANT CHANGE UP (ref 13–17)
IMM GRANULOCYTES NFR BLD AUTO: 0.5 % — SIGNIFICANT CHANGE UP (ref 0–1.5)
LYMPHOCYTES # BLD AUTO: 2.55 K/UL — SIGNIFICANT CHANGE UP (ref 1–3.3)
LYMPHOCYTES # BLD AUTO: 23.3 % — SIGNIFICANT CHANGE UP (ref 13–44)
MAGNESIUM SERPL-MCNC: 2.2 MG/DL — SIGNIFICANT CHANGE UP (ref 1.6–2.6)
MCHC RBC-ENTMCNC: 28.3 PG — SIGNIFICANT CHANGE UP (ref 27–34)
MCHC RBC-ENTMCNC: 31.1 GM/DL — LOW (ref 32–36)
MCV RBC AUTO: 91 FL — SIGNIFICANT CHANGE UP (ref 80–100)
MONOCYTES # BLD AUTO: 0.88 K/UL — SIGNIFICANT CHANGE UP (ref 0–0.9)
MONOCYTES NFR BLD AUTO: 8.1 % — SIGNIFICANT CHANGE UP (ref 2–14)
NEUTROPHILS # BLD AUTO: 7.32 K/UL — SIGNIFICANT CHANGE UP (ref 1.8–7.4)
NEUTROPHILS NFR BLD AUTO: 66.9 % — SIGNIFICANT CHANGE UP (ref 43–77)
NRBC # BLD: 0 /100 WBCS — SIGNIFICANT CHANGE UP (ref 0–0)
PHOSPHATE SERPL-MCNC: 3.2 MG/DL — SIGNIFICANT CHANGE UP (ref 2.5–4.5)
PLATELET # BLD AUTO: 134 K/UL — LOW (ref 150–400)
POTASSIUM SERPL-MCNC: 4.2 MMOL/L — SIGNIFICANT CHANGE UP (ref 3.5–5.3)
POTASSIUM SERPL-SCNC: 4.2 MMOL/L — SIGNIFICANT CHANGE UP (ref 3.5–5.3)
PROT SERPL-MCNC: 6.1 G/DL — SIGNIFICANT CHANGE UP (ref 6–8.3)
RBC # BLD: 4.77 M/UL — SIGNIFICANT CHANGE UP (ref 4.2–5.8)
RBC # FLD: 13.2 % — SIGNIFICANT CHANGE UP (ref 10.3–14.5)
SODIUM SERPL-SCNC: 137 MMOL/L — SIGNIFICANT CHANGE UP (ref 135–145)
WBC # BLD: 10.93 K/UL — HIGH (ref 3.8–10.5)
WBC # FLD AUTO: 10.93 K/UL — HIGH (ref 3.8–10.5)

## 2021-07-12 RX ORDER — LIDOCAINE 4 G/100G
1 CREAM TOPICAL DAILY
Refills: 0 | Status: DISCONTINUED | OUTPATIENT
Start: 2021-07-12 | End: 2021-07-15

## 2021-07-12 RX ORDER — ALPRAZOLAM 0.25 MG
0.5 TABLET ORAL
Refills: 0 | Status: DISCONTINUED | OUTPATIENT
Start: 2021-07-12 | End: 2021-07-15

## 2021-07-12 RX ORDER — TIOTROPIUM BROMIDE 18 UG/1
1 CAPSULE ORAL; RESPIRATORY (INHALATION) DAILY
Refills: 0 | Status: DISCONTINUED | OUTPATIENT
Start: 2021-07-12 | End: 2021-07-15

## 2021-07-12 RX ORDER — BUDESONIDE AND FORMOTEROL FUMARATE DIHYDRATE 160; 4.5 UG/1; UG/1
2 AEROSOL RESPIRATORY (INHALATION)
Refills: 0 | Status: DISCONTINUED | OUTPATIENT
Start: 2021-07-12 | End: 2021-07-15

## 2021-07-12 RX ORDER — OXYCODONE AND ACETAMINOPHEN 5; 325 MG/1; MG/1
2 TABLET ORAL EVERY 6 HOURS
Refills: 0 | Status: DISCONTINUED | OUTPATIENT
Start: 2021-07-12 | End: 2021-07-15

## 2021-07-12 RX ORDER — ALPRAZOLAM 0.25 MG
0.5 TABLET ORAL DAILY
Refills: 0 | Status: DISCONTINUED | OUTPATIENT
Start: 2021-07-12 | End: 2021-07-12

## 2021-07-12 RX ADMIN — BUDESONIDE AND FORMOTEROL FUMARATE DIHYDRATE 2 PUFF(S): 160; 4.5 AEROSOL RESPIRATORY (INHALATION) at 21:35

## 2021-07-12 RX ADMIN — OXYCODONE AND ACETAMINOPHEN 2 TABLET(S): 5; 325 TABLET ORAL at 13:38

## 2021-07-12 RX ADMIN — Medication 5: at 12:08

## 2021-07-12 RX ADMIN — TIOTROPIUM BROMIDE 1 CAPSULE(S): 18 CAPSULE ORAL; RESPIRATORY (INHALATION) at 13:37

## 2021-07-12 RX ADMIN — OXYCODONE AND ACETAMINOPHEN 2 TABLET(S): 5; 325 TABLET ORAL at 14:12

## 2021-07-12 RX ADMIN — PANTOPRAZOLE SODIUM 40 MILLIGRAM(S): 20 TABLET, DELAYED RELEASE ORAL at 06:17

## 2021-07-12 RX ADMIN — Medication 1 PATCH: at 07:27

## 2021-07-12 RX ADMIN — LIDOCAINE 1 PATCH: 4 CREAM TOPICAL at 21:39

## 2021-07-12 RX ADMIN — POLYETHYLENE GLYCOL 3350 17 GRAM(S): 17 POWDER, FOR SOLUTION ORAL at 12:11

## 2021-07-12 RX ADMIN — OXYCODONE AND ACETAMINOPHEN 2 TABLET(S): 5; 325 TABLET ORAL at 07:23

## 2021-07-12 RX ADMIN — OXYCODONE AND ACETAMINOPHEN 2 TABLET(S): 5; 325 TABLET ORAL at 00:20

## 2021-07-12 RX ADMIN — Medication 81 MILLIGRAM(S): at 12:07

## 2021-07-12 RX ADMIN — Medication 3 MILLILITER(S): at 09:12

## 2021-07-12 RX ADMIN — CHLORHEXIDINE GLUCONATE 1 APPLICATION(S): 213 SOLUTION TOPICAL at 05:55

## 2021-07-12 RX ADMIN — OXYCODONE AND ACETAMINOPHEN 2 TABLET(S): 5; 325 TABLET ORAL at 21:35

## 2021-07-12 RX ADMIN — Medication 3 MILLILITER(S): at 08:09

## 2021-07-12 RX ADMIN — Medication 3 MILLIGRAM(S): at 21:35

## 2021-07-12 RX ADMIN — Medication 1 PATCH: at 21:40

## 2021-07-12 RX ADMIN — Medication 150 MILLIGRAM(S): at 21:35

## 2021-07-12 RX ADMIN — LIDOCAINE 1 PATCH: 4 CREAM TOPICAL at 12:09

## 2021-07-12 RX ADMIN — RISPERIDONE 1 MILLIGRAM(S): 4 TABLET ORAL at 12:08

## 2021-07-12 RX ADMIN — Medication 3: at 17:43

## 2021-07-12 RX ADMIN — Medication 1 PATCH: at 12:11

## 2021-07-12 RX ADMIN — Medication 30 MILLIGRAM(S): at 05:52

## 2021-07-12 RX ADMIN — OXYCODONE AND ACETAMINOPHEN 2 TABLET(S): 5; 325 TABLET ORAL at 22:35

## 2021-07-12 RX ADMIN — OXYCODONE AND ACETAMINOPHEN 2 TABLET(S): 5; 325 TABLET ORAL at 06:53

## 2021-07-12 RX ADMIN — AZITHROMYCIN 500 MILLIGRAM(S): 500 TABLET, FILM COATED ORAL at 12:08

## 2021-07-12 RX ADMIN — ENOXAPARIN SODIUM 40 MILLIGRAM(S): 100 INJECTION SUBCUTANEOUS at 12:09

## 2021-07-12 RX ADMIN — Medication 0.5 MILLIGRAM(S): at 17:44

## 2021-07-12 RX ADMIN — Medication 2: at 08:06

## 2021-07-12 RX ADMIN — Medication 1 TABLET(S): at 12:08

## 2021-07-12 RX ADMIN — OLANZAPINE 5 MILLIGRAM(S): 15 TABLET, FILM COATED ORAL at 05:52

## 2021-07-12 RX ADMIN — Medication 3 MILLILITER(S): at 00:02

## 2021-07-12 RX ADMIN — Medication 1 PATCH: at 11:00

## 2021-07-12 RX ADMIN — Medication 0.5 MILLIGRAM(S): at 13:37

## 2021-07-12 RX ADMIN — OLANZAPINE 5 MILLIGRAM(S): 15 TABLET, FILM COATED ORAL at 17:45
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
## 2021-07-12 NOTE — PROGRESS NOTE ADULT - ASSESSMENT
60 YOM with a h/o COPD on 2L home O2, CAD with stent, CHF, DM, GERD, HLD, pHTN,  asthma, anxiety, and panic attacks was brought in by ambulance from his assisted living facility for shortness of breath was admitted for COPD exacerbation. He was found to be encephalopathic, had co2 retention. Patient is being transferred to ICU for respiratory failure requiring BiPAP.    ICU course:  Patient was admitted to the ICU due to acute hypoxic respiratory failure 2/2 COPD exacerbation. He was placed on BiPAP and high flow oxygen, but he is generally noncompliant with BiPAP therapy. He was treated with duoneb, solumedrol and azithromycin. Patient is currently hemodynamically stable and is saturating well on 2L NC and on BIPAP at night. Tejada catheter was removed. passed TOV. Pulmonary following.     Pt downgraded to medicine floor on 7/9. Improving on room air. BIPAP to be ordered for COPD management after d/c. pulmonary NP. Ms. Mikey is aware.

## 2021-07-12 NOTE — PROGRESS NOTE ADULT - SUBJECTIVE AND OBJECTIVE BOX
NP Note discussed with  Primary Attending    INTERVAL HPI/OVERNIGHT EVENTS: no new complaints    MEDICATIONS  (STANDING):  ALPRAZolam 0.5 milliGRAM(s) Oral daily  aspirin  chewable 81 milliGRAM(s) Oral daily  azithromycin   Tablet 500 milliGRAM(s) Oral daily  budesonide 160 MICROgram(s)/formoterol 4.5 MICROgram(s) Inhaler 2 Puff(s) Inhalation two times a day  chlorhexidine 2% Cloths 1 Application(s) Topical <User Schedule>  enoxaparin Injectable 40 milliGRAM(s) SubCutaneous daily  insulin lispro (ADMELOG) corrective regimen sliding scale   SubCutaneous three times a day before meals  insulin lispro (ADMELOG) corrective regimen sliding scale   SubCutaneous at bedtime  lidocaine   Patch 1 Patch Transdermal daily  melatonin 3 milliGRAM(s) Oral at bedtime  multivitamin/minerals 1 Tablet(s) Oral daily  nicotine -   7 mG/24Hr(s) Patch 1 patch Transdermal daily  OLANZapine Injectable 5 milliGRAM(s) IntraMuscular every 12 hours  pantoprazole    Tablet 40 milliGRAM(s) Oral before breakfast  polyethylene glycol 3350 17 Gram(s) Oral daily  predniSONE   Tablet 30 milliGRAM(s) Oral daily  risperiDONE   Tablet 1 milliGRAM(s) Oral daily  tiotropium 18 MICROgram(s) Capsule 1 Capsule(s) Inhalation daily  traZODone 150 milliGRAM(s) Oral at bedtime    MEDICATIONS  (PRN):  ALBUTerol    90 MICROgram(s) HFA Inhaler 2 Puff(s) Inhalation every 6 hours PRN Shortness of Breath and/or Wheezing  morphine  - Injectable 1 milliGRAM(s) IV Push every 12 hours PRN Moderate Pain (4 - 6)  oxycodone    5 mG/acetaminophen 325 mG 2 Tablet(s) Oral every 6 hours PRN Severe Pain (7 - 10)      __________________________________________________  REVIEW OF SYSTEMS:    CONSTITUTIONAL: No fever,   EYES: no acute visual disturbances  NECK: No pain or stiffness  RESPIRATORY: No cough; No shortness of breath  CARDIOVASCULAR: No chest pain, no palpitations  GASTROINTESTINAL: No pain. No nausea or vomiting; No diarrhea   NEUROLOGICAL: No headache or numbness, no tremors  MUSCULOSKELETAL: No joint pain, no muscle pain  GENITOURINARY: no dysuria, no frequency, no hesitancy  PSYCHIATRY: no depression , no anxiety  ALL OTHER  ROS negative        Vital Signs Last 24 Hrs  T(C): 36.2 (12 Jul 2021 14:14), Max: 36.5 (11 Jul 2021 20:58)  T(F): 97.2 (12 Jul 2021 14:14), Max: 97.7 (11 Jul 2021 20:58)  HR: 93 (12 Jul 2021 14:14) (89 - 96)  BP: 101/63 (12 Jul 2021 14:14) (101/63 - 113/68)  BP(mean): --  RR: 18 (12 Jul 2021 14:14) (17 - 18)  SpO2: 99% (12 Jul 2021 14:14) (97% - 99%)    ________________________________________________  PHYSICAL EXAM:  GENERAL: NAD  HEENT: Normocephalic;  conjunctivae and sclerae clear; moist mucous membranes;   NECK : supple  CHEST/LUNG: Clear to auscultation bilaterally with good air entry   HEART: S1 S2  regular; no murmurs, gallops or rubs  ABDOMEN: Soft, Nontender, Nondistended; Bowel sounds present  EXTREMITIES: no cyanosis; no edema; no calf tenderness  SKIN: warm and dry; no rash  NERVOUS SYSTEM:  Awake and alert; Oriented  to place, person and time ; no new deficits    _________________________________________________  LABS:                        13.5   10.93 )-----------( 134      ( 12 Jul 2021 07:33 )             43.4     07-12    137  |  100  |  19<H>  ----------------------------<  204<H>  4.2   |  32<H>  |  0.62    Ca    8.4      12 Jul 2021 07:33  Phos  3.2     07-12  Mg     2.2     07-12    TPro  6.1  /  Alb  3.1<L>  /  TBili  0.6  /  DBili  x   /  AST  10  /  ALT  50  /  AlkPhos  48  07-12        CAPILLARY BLOOD GLUCOSE      POCT Blood Glucose.: 374 mg/dL (12 Jul 2021 12:06)  POCT Blood Glucose.: 201 mg/dL (12 Jul 2021 07:23)  POCT Blood Glucose.: 391 mg/dL (11 Jul 2021 22:02)  POCT Blood Glucose.: 350 mg/dL (11 Jul 2021 17:07)        RADIOLOGY & ADDITIONAL TESTS:    Imaging  Reviewed:  YES    < from: Xray Chest 1 View- PORTABLE-Urgent (Xray Chest 1 View- PORTABLE-Urgent .) (07.07.21 @ 10:46) >    EXAM:  XR CHEST PORTABLE URGENT 1V                            PROCEDURE DATE:  07/07/2021          INTERPRETATION:  Portable chest radiograph    CLINICAL INFORMATION: Osteopenia. Assess for pneumonia.    TECHNIQUE:  Portable  AP view of the chest was obtained.    COMPARISON: 7/4/2021 chest available for review.    FINDINGS:  Lungs are clear gross airspace consolidations or effusions.    The  heart is enlarged in transverse diameter. No hilar mass.  .    Visualized osseous structures are intact.    IMPRESSION:   No evidence of active chest disease.  No interval change    --- End of Report ---            CHAU YAP MD; Attending Radiologist  This document has been electronically signed. Jul 8 2021 10:37AM    < end of copied text >  Consultant(s) Notes Reviewed:   YES      Plan of care was discussed with patient and /or primary care giver; all questions and concerns were addressed

## 2021-07-12 NOTE — PROGRESS NOTE ADULT - ASSESSMENT
60 YOM with a h/o COPD on 2L home O2, CAD with stent, CHF, DM, GERD, HLD, pHTN,  asthma, anxiety, and panic attacks was brought in by ambulance from his assisted living facility for shortness of breath was admitted for COPD exacerbation. He was found to be encephalopathic, had co2 retention. Patient is being transferred to ICU for respiratory failure requiring BiPAP.      ICU course:  Patient was admitted to the ICU due to acute hypoxic respiratory failure 2/2 COPD exacerbation. He was placed on BiPAP and high flow oxygen, but he is generally noncompliant with BiPAP therapy. He was treated with duoneb, solumedrol and azithromycin. Patient is currently hemodynamically stable and is saturating well on 2L NC and on BIPAP at night.

## 2021-07-12 NOTE — PROGRESS NOTE ADULT - PROBLEM SELECTOR PLAN 1
- uses home O2  -p/w with worsening SOB x few days, no cough most likely from COPD EXACERBATION  - Likely 2/2 to cigarette use; advised cessation and provided nicotine replacement therapy  - PE: b/l wheeze present , Lower extremity +1 pitting edema  - Leukocytosis (likely due to chronic steroid use), afebrile  - Gasses (venous):PH: 7.2, PCO2: 96, HCO3: 46 on admission, improving on repeated VBG  - elevated bicarb consistent with hypercapnia compensation by kidneys in setting of chronic co2 retaining  -  c/w albuterol prn  - c/w Spiriva and Cymbicort  - Supportive care and anti-tussives  - supplemental O2 if needed  - daily peak flow  -encourage smoking cessation wit nicotine patch  - Pulm dr. Marquez following  - BIPAP ordered for home use

## 2021-07-12 NOTE — PHARMACOTHERAPY INTERVENTION NOTE - COMMENTS
Recommended switching pantoprazole from IV to PO due to patient’s diet and oral intake of medications.
Duplication in treatment. Recommended to dc albuterol/ipratropium neb.   Approved.

## 2021-07-12 NOTE — PROGRESS NOTE ADULT - PROBLEM SELECTOR PLAN 1
Acute resolving.  Remains hypercapnic despite BIPAP usage. Patient requires Trilogy upon discharge secondary to BIPAP failure. PCO2 72 while on BIPAP. Failing BIPAP therapy. Patient has had several admissions for hypercapnia. Without NIV patient is a high risk for readmission, intubation and death.  Start Symbicort 160/4.5 2 puffs every 12 hours. Spiriva daily. Albuterol prn  Taper prednisone Secondary to COPD. GOLD stage 4D  Acute resolving.  Remains hypercapnic despite BIPAP usage. Patient requires Trilogy upon discharge secondary to BIPAP failure. PCO2 72 while on BIPAP. Failing BIPAP therapy. BIPAP not able to adequately manage COPD. Patient has had several admissions for hypercapnia. Without NIV patient is a high risk for readmission, intubation and death.  Start Symbicort 160/4.5 2 puffs every 12 hours. Spiriva daily. Albuterol prn  Taper prednisone Secondary to COPD. End stage COPD GOLD stage 4D  Acute resolving.  Remains hypercapnic despite BIPAP usage. Patient requires Trilogy upon discharge secondary to BIPAP failure. PCO2 72 while on BIPAP. Failing BIPAP therapy. BIPAP not able to adequately manage COPD. Patient has had several admissions for hypercapnia. Without NIV patient is a high risk for readmission, intubation and death.  Start Symbicort 160/4.5 2 puffs every 12 hours. Spiriva daily. Albuterol prn  Taper prednisone

## 2021-07-12 NOTE — PROGRESS NOTE ADULT - SUBJECTIVE AND OBJECTIVE BOX
NP Note discussed with  Primary Attending    Patient is a 60y old  Male who presents with a chief complaint of Shortness of breath (11 Jul 2021 17:05)      INTERVAL HPI/OVERNIGHT EVENTS: no new complaints. Use BIPAP for several hours last night. Patient will need NIV upon discharge because he is failing BIPAP therapy. Remains hypercapnic despite BIPAP usage.    MEDICATIONS  (STANDING):  albuterol/ipratropium for Nebulization 3 milliLiter(s) Nebulizer every 4 hours  aspirin  chewable 81 milliGRAM(s) Oral daily  azithromycin   Tablet 500 milliGRAM(s) Oral daily  chlorhexidine 2% Cloths 1 Application(s) Topical <User Schedule>  enoxaparin Injectable 40 milliGRAM(s) SubCutaneous daily  insulin lispro (ADMELOG) corrective regimen sliding scale   SubCutaneous three times a day before meals  insulin lispro (ADMELOG) corrective regimen sliding scale   SubCutaneous at bedtime  lidocaine   Patch 1 Patch Transdermal daily  melatonin 3 milliGRAM(s) Oral at bedtime  multivitamin/minerals 1 Tablet(s) Oral daily  nicotine -   7 mG/24Hr(s) Patch 1 patch Transdermal daily  OLANZapine Injectable 5 milliGRAM(s) IntraMuscular every 12 hours  pantoprazole    Tablet 40 milliGRAM(s) Oral before breakfast  polyethylene glycol 3350 17 Gram(s) Oral daily  predniSONE   Tablet 30 milliGRAM(s) Oral daily  risperiDONE   Tablet 1 milliGRAM(s) Oral daily  traZODone 150 milliGRAM(s) Oral at bedtime    MEDICATIONS  (PRN):  ALBUTerol    90 MICROgram(s) HFA Inhaler 2 Puff(s) Inhalation every 6 hours PRN Shortness of Breath and/or Wheezing  morphine  - Injectable 1 milliGRAM(s) IV Push every 12 hours PRN Moderate Pain (4 - 6)  oxycodone    5 mG/acetaminophen 325 mG 2 Tablet(s) Oral every 6 hours PRN Severe Pain (7 - 10)      __________________________________________________  REVIEW OF SYSTEMS:    CONSTITUTIONAL: No fever,   EYES: no acute visual disturbances  NECK: No pain or stiffness  RESPIRATORY: Nonproductive cough; +SOB with exertion  CARDIOVASCULAR: No chest pain, no palpitations  GASTROINTESTINAL: No pain. No nausea or vomiting; No diarrhea   NEUROLOGICAL: No headache or numbness, no tremors  MUSCULOSKELETAL: No joint pain, no muscle pain  GENITOURINARY: no dysuria, no frequency, no hesitancy  PSYCHIATRY: no depression , no anxiety  ALL OTHER  ROS negative        Vital Signs Last 24 Hrs  T(C): 36.3 (12 Jul 2021 04:54), Max: 36.7 (11 Jul 2021 12:49)  T(F): 97.3 (12 Jul 2021 04:54), Max: 98.1 (11 Jul 2021 12:49)  HR: 96 (12 Jul 2021 09:12) (89 - 104)  BP: 112/65 (12 Jul 2021 04:54) (101/70 - 113/68)  BP(mean): --  RR: 17 (12 Jul 2021 09:12) (17 - 18)  SpO2: 98% (12 Jul 2021 09:12) (94% - 98%)    ________________________________________________  PHYSICAL EXAM:  GENERAL: NAD  HEENT: Normocephalic;  conjunctivae and sclerae clear; moist mucous membranes;   NECK : supple, No JVD  CHEST/LUNG: Diminished breath sounds bilaterally. Slight wheezing noted right base  HEART: S1 S2  regular; no murmurs, gallops or rubs  ABDOMEN: Soft, Nontender, Nondistended; Bowel sounds present  EXTREMITIES: no cyanosis; no edema; no calf tenderness  SKIN: warm and dry; no rash  NERVOUS SYSTEM:  Awake and alert; Oriented  to place, person and time ; no new deficits    _________________________________________________  LABS:                        13.5   10.93 )-----------( 134      ( 12 Jul 2021 07:33 )             43.4     07-12    137  |  100  |  19<H>  ----------------------------<  204<H>  4.2   |  32<H>  |  0.62    Ca    8.4      12 Jul 2021 07:33  Phos  3.2     07-12  Mg     2.2     07-12    TPro  6.1  /  Alb  3.1<L>  /  TBili  0.6  /  DBili  x   /  AST  10  /  ALT  50  /  AlkPhos  48  07-12        CAPILLARY BLOOD GLUCOSE      POCT Blood Glucose.: 201 mg/dL (12 Jul 2021 07:23)  POCT Blood Glucose.: 391 mg/dL (11 Jul 2021 22:02)  POCT Blood Glucose.: 350 mg/dL (11 Jul 2021 17:07)        RADIOLOGY & ADDITIONAL TESTS:    Imaging Personally Reviewed:  YES  < from: Xray Chest 1 View-PORTABLE IMMEDIATE (Xray Chest 1 View-PORTABLE IMMEDIATE .) (07.04.21 @ 12:55) >  Prominent hilar again seen bilaterally.  The heart is not well assessed on an AP film.  There is patchy airspace disease at the right lung base, likely atelectasis or pneumonia. There may be a small right pleural effusion.  The left lung is clear.  There is no pneumothorax.    IMPRESSION:    Prominent emre again seen.    Right basilar atelectasis or pneumonia and possible small right pleural effusion.    < end of copied text >      Consultant(s) Notes Reviewed:   YES    Care Discussed with Consultants :     Plan of care was discussed with patient and /or primary care giver; all questions and concerns were addressed and care was aligned with patient's wishes.

## 2021-07-12 NOTE — PROGRESS NOTE ADULT - SUBJECTIVE AND OBJECTIVE BOX
Time of Visit:  Patient seen and examined.     MEDICATIONS  (STANDING):  ALPRAZolam 0.5 milliGRAM(s) Oral daily  aspirin  chewable 81 milliGRAM(s) Oral daily  azithromycin   Tablet 500 milliGRAM(s) Oral daily  budesonide 160 MICROgram(s)/formoterol 4.5 MICROgram(s) Inhaler 2 Puff(s) Inhalation two times a day  chlorhexidine 2% Cloths 1 Application(s) Topical <User Schedule>  enoxaparin Injectable 40 milliGRAM(s) SubCutaneous daily  insulin lispro (ADMELOG) corrective regimen sliding scale   SubCutaneous three times a day before meals  insulin lispro (ADMELOG) corrective regimen sliding scale   SubCutaneous at bedtime  lidocaine   Patch 1 Patch Transdermal daily  melatonin 3 milliGRAM(s) Oral at bedtime  multivitamin/minerals 1 Tablet(s) Oral daily  nicotine -   7 mG/24Hr(s) Patch 1 patch Transdermal daily  OLANZapine Injectable 5 milliGRAM(s) IntraMuscular every 12 hours  pantoprazole    Tablet 40 milliGRAM(s) Oral before breakfast  polyethylene glycol 3350 17 Gram(s) Oral daily  predniSONE   Tablet 30 milliGRAM(s) Oral daily  risperiDONE   Tablet 1 milliGRAM(s) Oral daily  tiotropium 18 MICROgram(s) Capsule 1 Capsule(s) Inhalation daily  traZODone 150 milliGRAM(s) Oral at bedtime      MEDICATIONS  (PRN):  ALBUTerol    90 MICROgram(s) HFA Inhaler 2 Puff(s) Inhalation every 6 hours PRN Shortness of Breath and/or Wheezing  morphine  - Injectable 1 milliGRAM(s) IV Push every 12 hours PRN Moderate Pain (4 - 6)  oxycodone    5 mG/acetaminophen 325 mG 2 Tablet(s) Oral every 6 hours PRN Severe Pain (7 - 10)       Medications up to date at time of exam.    ROS; No fever, chills, cough, congestion. Has SOB on exertion.   PHYSICAL EXAMINATION:  Vital Signs Last 24 Hrs  T(C): 36.2 (12 Jul 2021 14:14), Max: 36.5 (11 Jul 2021 20:58)  T(F): 97.2 (12 Jul 2021 14:14), Max: 97.7 (11 Jul 2021 20:58)  HR: 93 (12 Jul 2021 14:14) (89 - 96)  BP: 101/63 (12 Jul 2021 14:14) (101/63 - 113/68)  BP(mean): --  RR: 18 (12 Jul 2021 14:14) (17 - 18)  SpO2: 99% (12 Jul 2021 14:14) (97% - 99%)   (if applicable)    General: Alert and oriented. No acute distress.     HEENT: Head is normocephalic and atraumatic. No nasal tenderness. Extraocular muscles are intact. Mucous membranes are moist.     NECK: Supple, no palpable adenopathy.    LUNGS: Fair air entrance. Mild expiratory wheeze. Non labored. No use of accessory muscle.     HEART: S1 S2 Regular rate and no click./rub.     ABDOMEN: Soft, nontender, and nondistended.  No hepatosplenomegaly is noted. Active bowel sound.     EXTREMITIES: Without any cyanosis, clubbing, rash, lesions or edema.    NEUROLOGIC: Awake, alert, oriented.     SKIN: Warm and moist. Non diaphoretic.       LABS:                        13.5   10.93 )-----------( 134      ( 12 Jul 2021 07:33 )             43.4     07-12    137  |  100  |  19<H>  ----------------------------<  204<H>  4.2   |  32<H>  |  0.62    Ca    8.4      12 Jul 2021 07:33  Phos  3.2     07-12  Mg     2.2     07-12    TPro  6.1  /  Alb  3.1<L>  /  TBili  0.6  /  DBili  x   /  AST  10  /  ALT  50  /  AlkPhos  48  07-12      MICROBIOLOGY: (if applicable)    RADIOLOGY & ADDITIONAL STUDIES:  EKG:   CXR:  ECHO:    IMPRESSION: 60y Male PAST MEDICAL & SURGICAL HISTORY:  COPD (chronic obstructive pulmonary disease)    Stented coronary artery    HLD (hyperlipidemia)    Pulmonary HTN    Type 2 diabetes mellitus without complication, with long-term current use of insulin    Coronary artery disease involving native coronary artery of native heart without angina pectoris    Bipolar 1 disorder    Smoker    Gastroesophageal reflux disease, esophagitis presence not specified    Oxygen dependent    COPD (chronic obstructive pulmonary disease)    DM (diabetes mellitus)    CAD (coronary artery disease)    GERD (gastroesophageal reflux disease)    HLD (hyperlipidemia)    Insomnia    Polyarthritis    No significant past surgical history      Impression; 61 Y/O male presented in ED with SOB. Has COPD and uses Oxygen supplementation at Home. Was admitted to ICU for acute on chronic hypoxic hypercapnic respiratory  failure due to acute exacerbation  of COPD requiring NIPPV. Downgraded to Medicine Unit on 07-09-21.07-11-21 Negative for Covid 19 PCR.     Suggestion:  O2 saturation 94% room air, continue oxygen supplementation 2L NC and reinforced the importance of Daily compliance to PAP. Continue Bipap 12/5 FIO2 35% at Night.   Continue PRN Albuterol 2 puffs Q 6 Hours.  Continue Budesonide 2 puffs Twice daily.   Continue Prednisone 30 mg oral daily.   Continue tiotropium 18 mcg Daily.   Pulmonary oral hygiene care.   DVT/ GI prophylactic.   Reinforced the importance of smoking cessation .

## 2021-07-13 LAB
ANION GAP SERPL CALC-SCNC: 2 MMOL/L — LOW (ref 5–17)
BUN SERPL-MCNC: 17 MG/DL — SIGNIFICANT CHANGE UP (ref 7–18)
CALCIUM SERPL-MCNC: 8.7 MG/DL — SIGNIFICANT CHANGE UP (ref 8.4–10.5)
CHLORIDE SERPL-SCNC: 102 MMOL/L — SIGNIFICANT CHANGE UP (ref 96–108)
CO2 SERPL-SCNC: 36 MMOL/L — HIGH (ref 22–31)
CREAT SERPL-MCNC: 0.53 MG/DL — SIGNIFICANT CHANGE UP (ref 0.5–1.3)
GLUCOSE BLDC GLUCOMTR-MCNC: 221 MG/DL — HIGH (ref 70–99)
GLUCOSE BLDC GLUCOMTR-MCNC: 238 MG/DL — HIGH (ref 70–99)
GLUCOSE BLDC GLUCOMTR-MCNC: 344 MG/DL — HIGH (ref 70–99)
GLUCOSE SERPL-MCNC: 195 MG/DL — HIGH (ref 70–99)
HCT VFR BLD CALC: 40.7 % — SIGNIFICANT CHANGE UP (ref 39–50)
HGB BLD-MCNC: 12.5 G/DL — LOW (ref 13–17)
MCHC RBC-ENTMCNC: 28.2 PG — SIGNIFICANT CHANGE UP (ref 27–34)
MCHC RBC-ENTMCNC: 30.7 GM/DL — LOW (ref 32–36)
MCV RBC AUTO: 91.9 FL — SIGNIFICANT CHANGE UP (ref 80–100)
NRBC # BLD: 0 /100 WBCS — SIGNIFICANT CHANGE UP (ref 0–0)
PLATELET # BLD AUTO: 135 K/UL — LOW (ref 150–400)
POTASSIUM SERPL-MCNC: 4.1 MMOL/L — SIGNIFICANT CHANGE UP (ref 3.5–5.3)
POTASSIUM SERPL-SCNC: 4.1 MMOL/L — SIGNIFICANT CHANGE UP (ref 3.5–5.3)
RBC # BLD: 4.43 M/UL — SIGNIFICANT CHANGE UP (ref 4.2–5.8)
RBC # FLD: 13 % — SIGNIFICANT CHANGE UP (ref 10.3–14.5)
SODIUM SERPL-SCNC: 140 MMOL/L — SIGNIFICANT CHANGE UP (ref 135–145)
WBC # BLD: 11.52 K/UL — HIGH (ref 3.8–10.5)
WBC # FLD AUTO: 11.52 K/UL — HIGH (ref 3.8–10.5)

## 2021-07-13 RX ORDER — IPRATROPIUM/ALBUTEROL SULFATE 18-103MCG
3 AEROSOL WITH ADAPTER (GRAM) INHALATION EVERY 6 HOURS
Refills: 0 | Status: DISCONTINUED | OUTPATIENT
Start: 2021-07-13 | End: 2021-07-15

## 2021-07-13 RX ADMIN — BUDESONIDE AND FORMOTEROL FUMARATE DIHYDRATE 2 PUFF(S): 160; 4.5 AEROSOL RESPIRATORY (INHALATION) at 21:22

## 2021-07-13 RX ADMIN — Medication 2: at 18:03

## 2021-07-13 RX ADMIN — Medication 1 TABLET(S): at 12:51

## 2021-07-13 RX ADMIN — Medication 150 MILLIGRAM(S): at 21:21

## 2021-07-13 RX ADMIN — LIDOCAINE 1 PATCH: 4 CREAM TOPICAL at 12:52

## 2021-07-13 RX ADMIN — OLANZAPINE 5 MILLIGRAM(S): 15 TABLET, FILM COATED ORAL at 18:05

## 2021-07-13 RX ADMIN — Medication 81 MILLIGRAM(S): at 12:51

## 2021-07-13 RX ADMIN — Medication 2: at 08:51

## 2021-07-13 RX ADMIN — OXYCODONE AND ACETAMINOPHEN 2 TABLET(S): 5; 325 TABLET ORAL at 14:00

## 2021-07-13 RX ADMIN — Medication 0.5 MILLIGRAM(S): at 06:17

## 2021-07-13 RX ADMIN — OXYCODONE AND ACETAMINOPHEN 2 TABLET(S): 5; 325 TABLET ORAL at 13:01

## 2021-07-13 RX ADMIN — RISPERIDONE 1 MILLIGRAM(S): 4 TABLET ORAL at 12:53

## 2021-07-13 RX ADMIN — AZITHROMYCIN 500 MILLIGRAM(S): 500 TABLET, FILM COATED ORAL at 12:51

## 2021-07-13 RX ADMIN — Medication 3 MILLIGRAM(S): at 21:21

## 2021-07-13 RX ADMIN — ALBUTEROL 2 PUFF(S): 90 AEROSOL, METERED ORAL at 12:49

## 2021-07-13 RX ADMIN — Medication 1 PATCH: at 12:51

## 2021-07-13 RX ADMIN — Medication 1 PATCH: at 07:00

## 2021-07-13 RX ADMIN — BUDESONIDE AND FORMOTEROL FUMARATE DIHYDRATE 2 PUFF(S): 160; 4.5 AEROSOL RESPIRATORY (INHALATION) at 12:49

## 2021-07-13 RX ADMIN — TIOTROPIUM BROMIDE 1 CAPSULE(S): 18 CAPSULE ORAL; RESPIRATORY (INHALATION) at 12:50

## 2021-07-13 RX ADMIN — Medication 30 MILLIGRAM(S): at 06:17

## 2021-07-13 RX ADMIN — Medication 4: at 12:53

## 2021-07-13 RX ADMIN — PANTOPRAZOLE SODIUM 40 MILLIGRAM(S): 20 TABLET, DELAYED RELEASE ORAL at 06:17

## 2021-07-13 RX ADMIN — Medication 0.5 MILLIGRAM(S): at 18:05

## 2021-07-13 RX ADMIN — OXYCODONE AND ACETAMINOPHEN 2 TABLET(S): 5; 325 TABLET ORAL at 21:22

## 2021-07-13 RX ADMIN — LIDOCAINE 1 PATCH: 4 CREAM TOPICAL at 19:00

## 2021-07-13 RX ADMIN — ENOXAPARIN SODIUM 40 MILLIGRAM(S): 100 INJECTION SUBCUTANEOUS at 12:51

## 2021-07-13 RX ADMIN — CHLORHEXIDINE GLUCONATE 1 APPLICATION(S): 213 SOLUTION TOPICAL at 06:18

## 2021-07-13 RX ADMIN — LIDOCAINE 1 PATCH: 4 CREAM TOPICAL at 00:00

## 2021-07-13 RX ADMIN — Medication 1 PATCH: at 12:55

## 2021-07-13 RX ADMIN — Medication 1 PATCH: at 19:00

## 2021-07-13 NOTE — PROGRESS NOTE ADULT - PROBLEM SELECTOR PLAN 6
- continue pantoprozole 40mg
- continue pantoprozole 40mg
Patient requires Trilogy upon discharge.  Trilogy ordered.  Without Trilogy patient is a high risk for readmission, intubation and death.

## 2021-07-13 NOTE — PROGRESS NOTE ADULT - PROBLEM SELECTOR PLAN 10
(1/8) K 3.3  Cl 94  glucose 149 BUN 23
d/c Assisted living MY when BIPAP delivered
d/c Assisted living MY when BIPAP delivered

## 2021-07-13 NOTE — PROGRESS NOTE ADULT - PROBLEM SELECTOR PLAN 5
Pt has hx of cad  - continue aspirin
Pt has hx of cad  - continue aspirin
Antihypertensive currently on hold. Continue to monitor.  Continue daily ASA

## 2021-07-13 NOTE — PROGRESS NOTE ADULT - PROBLEM SELECTOR PLAN 2
-Pt has hx of pul htn   -on home o2
-Pt has hx of pul htn   -on home o2
End stage GOLD 4D  Continue oxygen during the day and BIPAP nightly.  Patient will need Trilogy upon discharge. Failing BIPAP therapy. Remains hypercapnic despite BIPAP usage.  Continue bronchodilators and ICS.  Taper prednisone.  CT from last year reviewed. Showed moderate centrilobular emphysema.
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
## 2021-07-13 NOTE — PROGRESS NOTE ADULT - ASSESSMENT
60 YOM with a h/o COPD on 2L home O2, CAD with stent, CHF, DM, GERD, HLD, pHTN,  asthma, anxiety, and panic attacks was brought in by ambulance from his assisted living facility for shortness of breath was admitted for COPD exacerbation. He was found to be encephalopathic, had co2 retention. Patient is being transferred to ICU for respiratory failure requiring BiPAP.    ICU course:  Patient was admitted to the ICU due to acute hypoxic respiratory failure 2/2 COPD exacerbation. He was placed on BiPAP and high flow oxygen, but he is generally noncompliant with BiPAP therapy. He was treated with duoneb, solumedrol and azithromycin. Patient is currently hemodynamically stable and is saturating well on 2L NC and on BIPAP at night. Tejada catheter was removed. passed TOV. Pulmonary following.     Pt downgraded to medicine floor on 7/9. Improving on room air. BIPAP ordered for COPD management after d/c. sent on 7/13. pulmonary NP. Ms. frandy is aware.

## 2021-07-13 NOTE — PROGRESS NOTE ADULT - PROBLEM SELECTOR PLAN 8
(3) no apparent problem
RISK                                                          Points  [] Previous VTE                                           3  [] Thrombophilia                                        2  [] Lower limb paralysis                              2   [] Current Cancer                                       2   [x] Immobilization > 24 hrs                        1  [] ICU/CCU stay > 24 hours                       1  [] Age > 60                                                   1    Score: 1, initiate 40mg Lovenox SubQ
RISK                                                          Points  [] Previous VTE                                           3  [] Thrombophilia                                        2  [] Lower limb paralysis                              2   [] Current Cancer                                       2   [x] Immobilization > 24 hrs                        1  [] ICU/CCU stay > 24 hours                       1  [] Age > 60                                                   1    Score: 1, initiate 40mg Lovenox SubQ

## 2021-07-13 NOTE — PROGRESS NOTE ADULT - PROBLEM SELECTOR PLAN 1
- uses home O2  -p/w with worsening SOB x few days, no cough most likely from COPD EXACERBATION  - Likely 2/2 to cigarette use; advised cessation and provided nicotine replacement therapy  - PE: b/l wheeze present , Lower extremity +1 pitting edema  - Leukocytosis (likely due to chronic steroid use), afebrile  - Gasses (venous):PH: 7.2, PCO2: 96, HCO3: 46 on admission, improving on repeated VBG  - elevated bicarb consistent with hypercapnia compensation by kidneys in setting of chronic co2 retaining  -  c/w albuterol prn  - c/w Spiriva and Cymbicort  - Supportive care and anti-tussives  - supplemental O2 if needed  - daily peak flow  -encourage smoking cessation wit nicotine patch  - Pulm dr. Marquez following  - BIPAP ordered for home use, form sent 7/13

## 2021-07-13 NOTE — PROGRESS NOTE ADULT - PROBLEM SELECTOR PLAN 9
Full code  Pt has a capacity for health decision. not much about health insight   Sister Katherine Conti [phone], updated and discussed plan of care.
Full code  Pt has a capacity for health decision. not much about health insight   Sister Katherine Conti [phone], updated and discussed plan of care.

## 2021-07-13 NOTE — PROGRESS NOTE ADULT - PROBLEM SELECTOR PROBLEM 1
Acute on chronic respiratory failure with hypoxia and hypercapnia
Chronic obstructive pulmonary disease, unspecified COPD type
Chronic obstructive pulmonary disease, unspecified COPD type

## 2021-07-13 NOTE — PROGRESS NOTE ADULT - PROBLEM SELECTOR PLAN 4
-patient on risperdal 1mg, and alparazolam 0.5  -continue home meds
-patient on risperdal 1mg, and alparazolam 0.5  -continue home meds
Continue sliding scale coverage.  Monitor glucose.

## 2021-07-13 NOTE — PROGRESS NOTE ADULT - PROBLEM SELECTOR PLAN 7
-A1C 6.5  -c/w sliding scale   -c/w HSS  -monitor BS ACHS
-A1C 6.5  -c/w sliding scale   -c/w HSS  -monitor BS ACHS
DVT and GI prophylaxis.  Patient will need Trilogy upon discharge.  Discharge planning started.

## 2021-07-13 NOTE — PROGRESS NOTE ADULT - SUBJECTIVE AND OBJECTIVE BOX
NP Note discussed with  Primary Attending    INTERVAL HPI/OVERNIGHT EVENTS: no new complaints    MEDICATIONS  (STANDING):  ALPRAZolam 0.5 milliGRAM(s) Oral two times a day  aspirin  chewable 81 milliGRAM(s) Oral daily  budesonide 160 MICROgram(s)/formoterol 4.5 MICROgram(s) Inhaler 2 Puff(s) Inhalation two times a day  chlorhexidine 2% Cloths 1 Application(s) Topical <User Schedule>  enoxaparin Injectable 40 milliGRAM(s) SubCutaneous daily  insulin lispro (ADMELOG) corrective regimen sliding scale   SubCutaneous three times a day before meals  insulin lispro (ADMELOG) corrective regimen sliding scale   SubCutaneous at bedtime  lidocaine   Patch 1 Patch Transdermal daily  melatonin 3 milliGRAM(s) Oral at bedtime  multivitamin/minerals 1 Tablet(s) Oral daily  nicotine -   7 mG/24Hr(s) Patch 1 patch Transdermal daily  OLANZapine Injectable 5 milliGRAM(s) IntraMuscular every 12 hours  pantoprazole    Tablet 40 milliGRAM(s) Oral before breakfast  polyethylene glycol 3350 17 Gram(s) Oral daily  predniSONE   Tablet 30 milliGRAM(s) Oral daily  risperiDONE   Tablet 1 milliGRAM(s) Oral daily  tiotropium 18 MICROgram(s) Capsule 1 Capsule(s) Inhalation daily  traZODone 150 milliGRAM(s) Oral at bedtime    MEDICATIONS  (PRN):  ALBUTerol    90 MICROgram(s) HFA Inhaler 2 Puff(s) Inhalation every 6 hours PRN Shortness of Breath and/or Wheezing  albuterol/ipratropium for Nebulization 3 milliLiter(s) Nebulizer every 6 hours PRN Shortness of Breath and/or Wheezing  morphine  - Injectable 1 milliGRAM(s) IV Push every 12 hours PRN Moderate Pain (4 - 6)  oxycodone    5 mG/acetaminophen 325 mG 2 Tablet(s) Oral every 6 hours PRN Severe Pain (7 - 10)      __________________________________________________  REVIEW OF SYSTEMS:    CONSTITUTIONAL: No fever,   EYES: no acute visual disturbances  NECK: No pain or stiffness  RESPIRATORY: No cough; No shortness of breath  CARDIOVASCULAR: No chest pain, no palpitations  GASTROINTESTINAL: No pain. No nausea or vomiting; No diarrhea   NEUROLOGICAL: No headache or numbness, no tremors  MUSCULOSKELETAL: No joint pain, no muscle pain  GENITOURINARY: no dysuria, no frequency, no hesitancy  PSYCHIATRY: no depression , +  anxiety  ALL OTHER  ROS negative        Vital Signs Last 24 Hrs  T(C): 36.9 (13 Jul 2021 13:20), Max: 36.9 (13 Jul 2021 13:20)  T(F): 98.4 (13 Jul 2021 13:20), Max: 98.4 (13 Jul 2021 13:20)  HR: 85 (13 Jul 2021 13:20) (80 - 106)  BP: 125/54 (13 Jul 2021 13:20) (111/68 - 125/54)  BP(mean): --  RR: 18 (13 Jul 2021 13:20) (18 - 18)  SpO2: 97% (13 Jul 2021 13:20) (97% - 98%)    ________________________________________________  PHYSICAL EXAM:  GENERAL: NAD, obese  HEENT: Normocephalic;  conjunctivae and sclerae clear; moist mucous membranes;   NECK : supple  CHEST/LUNG: Clear to auscultation bilaterally with fair air entry   HEART: S1 S2  regular; no murmurs, gallops or rubs  ABDOMEN: Soft, Nontender, Nondistended; Bowel sounds present  EXTREMITIES: no cyanosis; no edema; no calf tenderness  SKIN: warm and dry; no rash  NERVOUS SYSTEM:  Awake and alert; Oriented  to place, person and time ; no new deficits    _________________________________________________  LABS:                        12.5   11.52 )-----------( 135      ( 13 Jul 2021 07:12 )             40.7     07-13    140  |  102  |  17  ----------------------------<  195<H>  4.1   |  36<H>  |  0.53    Ca    8.7      13 Jul 2021 07:12  Phos  3.2     07-12  Mg     2.2     07-12    TPro  6.1  /  Alb  3.1<L>  /  TBili  0.6  /  DBili  x   /  AST  10  /  ALT  50  /  AlkPhos  48  07-12        CAPILLARY BLOOD GLUCOSE      POCT Blood Glucose.: 344 mg/dL (13 Jul 2021 12:11)  POCT Blood Glucose.: 221 mg/dL (13 Jul 2021 08:08)  POCT Blood Glucose.: 220 mg/dL (12 Jul 2021 21:29)  POCT Blood Glucose.: 274 mg/dL (12 Jul 2021 17:25)        RADIOLOGY & ADDITIONAL TESTS:    Imaging  Reviewed:  YES    Consultant(s) Notes Reviewed:   YES      Plan of care was discussed with patient and /or primary care giver; all questions and concerns were addressed

## 2021-07-13 NOTE — PROGRESS NOTE ADULT - PROBLEM SELECTOR PLAN 3
-patient is on Symbicort at home  - will receive albuterol nebulizer
-patient is on Symbicort at home  - will receive albuterol nebulizer
Continue current medications.  Maintain safety precautions.

## 2021-07-14 LAB
ANION GAP SERPL CALC-SCNC: 4 MMOL/L — LOW (ref 5–17)
BUN SERPL-MCNC: 18 MG/DL — SIGNIFICANT CHANGE UP (ref 7–18)
CALCIUM SERPL-MCNC: 8.4 MG/DL — SIGNIFICANT CHANGE UP (ref 8.4–10.5)
CHLORIDE SERPL-SCNC: 96 MMOL/L — SIGNIFICANT CHANGE UP (ref 96–108)
CO2 SERPL-SCNC: 38 MMOL/L — HIGH (ref 22–31)
CREAT SERPL-MCNC: 0.63 MG/DL — SIGNIFICANT CHANGE UP (ref 0.5–1.3)
GLUCOSE BLDC GLUCOMTR-MCNC: 211 MG/DL — HIGH (ref 70–99)
GLUCOSE BLDC GLUCOMTR-MCNC: 238 MG/DL — HIGH (ref 70–99)
GLUCOSE BLDC GLUCOMTR-MCNC: 256 MG/DL — HIGH (ref 70–99)
GLUCOSE BLDC GLUCOMTR-MCNC: 282 MG/DL — HIGH (ref 70–99)
GLUCOSE BLDC GLUCOMTR-MCNC: 356 MG/DL — HIGH (ref 70–99)
GLUCOSE SERPL-MCNC: 274 MG/DL — HIGH (ref 70–99)
HCT VFR BLD CALC: 41.7 % — SIGNIFICANT CHANGE UP (ref 39–50)
HGB BLD-MCNC: 12.5 G/DL — LOW (ref 13–17)
MCHC RBC-ENTMCNC: 27.8 PG — SIGNIFICANT CHANGE UP (ref 27–34)
MCHC RBC-ENTMCNC: 30 GM/DL — LOW (ref 32–36)
MCV RBC AUTO: 92.9 FL — SIGNIFICANT CHANGE UP (ref 80–100)
NRBC # BLD: 0 /100 WBCS — SIGNIFICANT CHANGE UP (ref 0–0)
PLATELET # BLD AUTO: 131 K/UL — LOW (ref 150–400)
POTASSIUM SERPL-MCNC: 5.2 MMOL/L — SIGNIFICANT CHANGE UP (ref 3.5–5.3)
POTASSIUM SERPL-SCNC: 5.2 MMOL/L — SIGNIFICANT CHANGE UP (ref 3.5–5.3)
RBC # BLD: 4.49 M/UL — SIGNIFICANT CHANGE UP (ref 4.2–5.8)
RBC # FLD: 13 % — SIGNIFICANT CHANGE UP (ref 10.3–14.5)
SARS-COV-2 RNA SPEC QL NAA+PROBE: SIGNIFICANT CHANGE UP
SODIUM SERPL-SCNC: 138 MMOL/L — SIGNIFICANT CHANGE UP (ref 135–145)
WBC # BLD: 12.14 K/UL — HIGH (ref 3.8–10.5)
WBC # FLD AUTO: 12.14 K/UL — HIGH (ref 3.8–10.5)

## 2021-07-14 RX ORDER — FUROSEMIDE 40 MG
1 TABLET ORAL
Qty: 0 | Refills: 0 | DISCHARGE

## 2021-07-14 RX ORDER — NYSTATIN 500MM UNIT
4 POWDER (EA) MISCELLANEOUS
Qty: 0 | Refills: 0 | DISCHARGE

## 2021-07-14 RX ORDER — INSULIN HUMAN 100 [IU]/ML
0 INJECTION, SOLUTION SUBCUTANEOUS
Qty: 0 | Refills: 0 | DISCHARGE

## 2021-07-14 RX ORDER — LIDOCAINE 4 G/100G
1 CREAM TOPICAL
Qty: 1 | Refills: 0
Start: 2021-07-14 | End: 2021-08-12

## 2021-07-14 RX ORDER — ALPRAZOLAM 0.25 MG
0.5 TABLET ORAL
Qty: 0 | Refills: 0 | DISCHARGE

## 2021-07-14 RX ORDER — FLUCONAZOLE 150 MG/1
1 TABLET ORAL
Qty: 0 | Refills: 0 | DISCHARGE

## 2021-07-14 RX ORDER — LISINOPRIL 2.5 MG/1
0 TABLET ORAL
Qty: 0 | Refills: 0 | DISCHARGE

## 2021-07-14 RX ORDER — NICOTINE POLACRILEX 2 MG
1 GUM BUCCAL
Qty: 30 | Refills: 0
Start: 2021-07-14 | End: 2021-08-12

## 2021-07-14 RX ORDER — TIOTROPIUM BROMIDE 18 UG/1
1 CAPSULE ORAL; RESPIRATORY (INHALATION)
Qty: 1 | Refills: 0
Start: 2021-07-14 | End: 2021-08-12

## 2021-07-14 RX ORDER — FUROSEMIDE 40 MG
1 TABLET ORAL
Qty: 30 | Refills: 0
Start: 2021-07-14 | End: 2021-08-12

## 2021-07-14 RX ORDER — ALPRAZOLAM 0.25 MG
1 TABLET ORAL
Qty: 60 | Refills: 0
Start: 2021-07-14 | End: 2021-08-12

## 2021-07-14 RX ADMIN — CHLORHEXIDINE GLUCONATE 1 APPLICATION(S): 213 SOLUTION TOPICAL at 06:55

## 2021-07-14 RX ADMIN — Medication 1 PATCH: at 07:37

## 2021-07-14 RX ADMIN — Medication 0.5 MILLIGRAM(S): at 06:55

## 2021-07-14 RX ADMIN — Medication 0.5 MILLIGRAM(S): at 17:31

## 2021-07-14 RX ADMIN — OXYCODONE AND ACETAMINOPHEN 2 TABLET(S): 5; 325 TABLET ORAL at 22:49

## 2021-07-14 RX ADMIN — Medication 1 PATCH: at 12:00

## 2021-07-14 RX ADMIN — PANTOPRAZOLE SODIUM 40 MILLIGRAM(S): 20 TABLET, DELAYED RELEASE ORAL at 06:55

## 2021-07-14 RX ADMIN — Medication 1 TABLET(S): at 12:48

## 2021-07-14 RX ADMIN — Medication 30 MILLIGRAM(S): at 06:55

## 2021-07-14 RX ADMIN — OXYCODONE AND ACETAMINOPHEN 2 TABLET(S): 5; 325 TABLET ORAL at 08:29

## 2021-07-14 RX ADMIN — Medication 81 MILLIGRAM(S): at 12:50

## 2021-07-14 RX ADMIN — LIDOCAINE 1 PATCH: 4 CREAM TOPICAL at 00:32

## 2021-07-14 RX ADMIN — Medication 5: at 17:30

## 2021-07-14 RX ADMIN — Medication 3: at 08:30

## 2021-07-14 RX ADMIN — Medication 3 MILLIGRAM(S): at 21:35

## 2021-07-14 RX ADMIN — OXYCODONE AND ACETAMINOPHEN 2 TABLET(S): 5; 325 TABLET ORAL at 16:14

## 2021-07-14 RX ADMIN — OXYCODONE AND ACETAMINOPHEN 2 TABLET(S): 5; 325 TABLET ORAL at 09:30

## 2021-07-14 RX ADMIN — Medication 1 PATCH: at 12:51

## 2021-07-14 RX ADMIN — TIOTROPIUM BROMIDE 1 CAPSULE(S): 18 CAPSULE ORAL; RESPIRATORY (INHALATION) at 12:48

## 2021-07-14 RX ADMIN — Medication 3: at 12:10

## 2021-07-14 RX ADMIN — Medication 150 MILLIGRAM(S): at 21:36

## 2021-07-14 RX ADMIN — ENOXAPARIN SODIUM 40 MILLIGRAM(S): 100 INJECTION SUBCUTANEOUS at 12:48

## 2021-07-14 RX ADMIN — BUDESONIDE AND FORMOTEROL FUMARATE DIHYDRATE 2 PUFF(S): 160; 4.5 AEROSOL RESPIRATORY (INHALATION) at 12:47

## 2021-07-14 RX ADMIN — BUDESONIDE AND FORMOTEROL FUMARATE DIHYDRATE 2 PUFF(S): 160; 4.5 AEROSOL RESPIRATORY (INHALATION) at 22:52

## 2021-07-14 RX ADMIN — LIDOCAINE 1 PATCH: 4 CREAM TOPICAL at 12:48

## 2021-07-14 RX ADMIN — RISPERIDONE 1 MILLIGRAM(S): 4 TABLET ORAL at 12:48

## 2021-07-14 NOTE — CHART NOTE - NSCHARTNOTEFT_GEN_A_CORE
pt planned for d/c to [facility] today. Previously [facility] would accept delayed arrival, now they don't want to accept him without BIPAP setting, Already addressed no need to wait for delivery.  trilogy has been approved for patient. Equipment will be set up by RT when pt arrives. Pt to be discharged tomorrow morning. Endorsed incoming NP and nursing staff.
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
## 2021-07-15 ENCOUNTER — TRANSCRIPTION ENCOUNTER (OUTPATIENT)
Age: 61
End: 2021-07-15

## 2021-07-15 VITALS
HEART RATE: 90 BPM | DIASTOLIC BLOOD PRESSURE: 67 MMHG | SYSTOLIC BLOOD PRESSURE: 103 MMHG | RESPIRATION RATE: 18 BRPM | OXYGEN SATURATION: 95 % | TEMPERATURE: 98 F

## 2021-07-15 LAB — GLUCOSE BLDC GLUCOMTR-MCNC: 194 MG/DL — HIGH (ref 70–99)

## 2021-07-15 PROCEDURE — 85025 COMPLETE CBC W/AUTO DIFF WBC: CPT

## 2021-07-15 PROCEDURE — 83735 ASSAY OF MAGNESIUM: CPT

## 2021-07-15 PROCEDURE — 96374 THER/PROPH/DIAG INJ IV PUSH: CPT

## 2021-07-15 PROCEDURE — 80048 BASIC METABOLIC PNL TOTAL CA: CPT

## 2021-07-15 PROCEDURE — 94660 CPAP INITIATION&MGMT: CPT

## 2021-07-15 PROCEDURE — 36415 COLL VENOUS BLD VENIPUNCTURE: CPT

## 2021-07-15 PROCEDURE — 84443 ASSAY THYROID STIM HORMONE: CPT

## 2021-07-15 PROCEDURE — 85652 RBC SED RATE AUTOMATED: CPT

## 2021-07-15 PROCEDURE — 71045 X-RAY EXAM CHEST 1 VIEW: CPT

## 2021-07-15 PROCEDURE — 99285 EMERGENCY DEPT VISIT HI MDM: CPT

## 2021-07-15 PROCEDURE — 84484 ASSAY OF TROPONIN QUANT: CPT

## 2021-07-15 PROCEDURE — 83880 ASSAY OF NATRIURETIC PEPTIDE: CPT

## 2021-07-15 PROCEDURE — 83036 HEMOGLOBIN GLYCOSYLATED A1C: CPT

## 2021-07-15 PROCEDURE — 96375 TX/PRO/DX INJ NEW DRUG ADDON: CPT

## 2021-07-15 PROCEDURE — 86140 C-REACTIVE PROTEIN: CPT

## 2021-07-15 PROCEDURE — 82607 VITAMIN B-12: CPT

## 2021-07-15 PROCEDURE — 80053 COMPREHEN METABOLIC PANEL: CPT

## 2021-07-15 PROCEDURE — 93306 TTE W/DOPPLER COMPLETE: CPT

## 2021-07-15 PROCEDURE — 82962 GLUCOSE BLOOD TEST: CPT

## 2021-07-15 PROCEDURE — 87635 SARS-COV-2 COVID-19 AMP PRB: CPT

## 2021-07-15 PROCEDURE — 87641 MR-STAPH DNA AMP PROBE: CPT

## 2021-07-15 PROCEDURE — 82746 ASSAY OF FOLIC ACID SERUM: CPT

## 2021-07-15 PROCEDURE — 85027 COMPLETE CBC AUTOMATED: CPT

## 2021-07-15 PROCEDURE — 81001 URINALYSIS AUTO W/SCOPE: CPT

## 2021-07-15 PROCEDURE — 82803 BLOOD GASES ANY COMBINATION: CPT

## 2021-07-15 PROCEDURE — 84100 ASSAY OF PHOSPHORUS: CPT

## 2021-07-15 PROCEDURE — 94640 AIRWAY INHALATION TREATMENT: CPT

## 2021-07-15 PROCEDURE — 87640 STAPH A DNA AMP PROBE: CPT

## 2021-07-15 PROCEDURE — 82306 VITAMIN D 25 HYDROXY: CPT

## 2021-07-15 PROCEDURE — 80061 LIPID PANEL: CPT

## 2021-07-15 PROCEDURE — 84145 PROCALCITONIN (PCT): CPT

## 2021-07-15 PROCEDURE — 93005 ELECTROCARDIOGRAM TRACING: CPT

## 2021-07-15 PROCEDURE — 83605 ASSAY OF LACTIC ACID: CPT

## 2021-07-15 PROCEDURE — 86769 SARS-COV-2 COVID-19 ANTIBODY: CPT

## 2021-07-15 RX ORDER — ACETAMINOPHEN 500 MG
650 TABLET ORAL ONCE
Refills: 0 | Status: COMPLETED | OUTPATIENT
Start: 2021-07-15 | End: 2021-07-15

## 2021-07-15 RX ADMIN — OXYCODONE AND ACETAMINOPHEN 2 TABLET(S): 5; 325 TABLET ORAL at 06:48

## 2021-07-15 RX ADMIN — CHLORHEXIDINE GLUCONATE 1 APPLICATION(S): 213 SOLUTION TOPICAL at 06:29

## 2021-07-15 RX ADMIN — Medication 650 MILLIGRAM(S): at 11:08

## 2021-07-15 RX ADMIN — Medication 1: at 08:27

## 2021-07-15 RX ADMIN — Medication 0.5 MILLIGRAM(S): at 06:29

## 2021-07-15 RX ADMIN — ENOXAPARIN SODIUM 40 MILLIGRAM(S): 100 INJECTION SUBCUTANEOUS at 11:26

## 2021-07-15 RX ADMIN — Medication 1 PATCH: at 07:21

## 2021-07-15 RX ADMIN — BUDESONIDE AND FORMOTEROL FUMARATE DIHYDRATE 2 PUFF(S): 160; 4.5 AEROSOL RESPIRATORY (INHALATION) at 10:27

## 2021-07-15 RX ADMIN — Medication 1 TABLET(S): at 11:25

## 2021-07-15 RX ADMIN — OXYCODONE AND ACETAMINOPHEN 2 TABLET(S): 5; 325 TABLET ORAL at 07:10

## 2021-07-15 RX ADMIN — Medication 20 MILLIGRAM(S): at 06:29

## 2021-07-15 RX ADMIN — RISPERIDONE 1 MILLIGRAM(S): 4 TABLET ORAL at 11:25

## 2021-07-15 RX ADMIN — PANTOPRAZOLE SODIUM 40 MILLIGRAM(S): 20 TABLET, DELAYED RELEASE ORAL at 06:29

## 2021-07-15 RX ADMIN — Medication 81 MILLIGRAM(S): at 11:25

## 2021-07-15 NOTE — PROGRESS NOTE ADULT - SUBJECTIVE AND OBJECTIVE BOX
Time of Visit:  Patient seen and examined.     MEDICATIONS  (STANDING):  ALPRAZolam 0.5 milliGRAM(s) Oral two times a day  aspirin  chewable 81 milliGRAM(s) Oral daily  budesonide 160 MICROgram(s)/formoterol 4.5 MICROgram(s) Inhaler 2 Puff(s) Inhalation two times a day  chlorhexidine 2% Cloths 1 Application(s) Topical <User Schedule>  enoxaparin Injectable 40 milliGRAM(s) SubCutaneous daily  insulin lispro (ADMELOG) corrective regimen sliding scale   SubCutaneous at bedtime  insulin lispro (ADMELOG) corrective regimen sliding scale   SubCutaneous three times a day before meals  lidocaine   Patch 1 Patch Transdermal daily  melatonin 3 milliGRAM(s) Oral at bedtime  multivitamin/minerals 1 Tablet(s) Oral daily  nicotine -   7 mG/24Hr(s) Patch 1 patch Transdermal daily  OLANZapine Injectable 5 milliGRAM(s) IntraMuscular every 12 hours  pantoprazole    Tablet 40 milliGRAM(s) Oral before breakfast  polyethylene glycol 3350 17 Gram(s) Oral daily  predniSONE   Tablet 20 milliGRAM(s) Oral daily  risperiDONE   Tablet 1 milliGRAM(s) Oral daily  tiotropium 18 MICROgram(s) Capsule 1 Capsule(s) Inhalation daily  traZODone 150 milliGRAM(s) Oral at bedtime      MEDICATIONS  (PRN):  ALBUTerol    90 MICROgram(s) HFA Inhaler 2 Puff(s) Inhalation every 6 hours PRN Shortness of Breath and/or Wheezing  albuterol/ipratropium for Nebulization 3 milliLiter(s) Nebulizer every 6 hours PRN Shortness of Breath and/or Wheezing  oxycodone    5 mG/acetaminophen 325 mG 2 Tablet(s) Oral every 6 hours PRN Severe Pain (7 - 10)       Medications up to date at time of exam.    ROS; No fever, chills, cough, congestion. Denies SOB on exam.   PHYSICAL EXAMINATION:    Vital Signs Last 24 Hrs  T(C): 36.8 (15 Jul 2021 05:23), Max: 36.8 (15 Jul 2021 05:23)  T(F): 98.2 (15 Jul 2021 05:23), Max: 98.2 (15 Jul 2021 05:23)  HR: 90 (15 Jul 2021 05:23) (90 - 94)  BP: 103/67 (15 Jul 2021 05:23) (103/67 - 106/70)  BP(mean): --  RR: 18 (15 Jul 2021 05:23) (18 - 18)  SpO2: 95% (15 Jul 2021 05:23) (95% - 95%)   (if applicable)    General: Alert and oriented. Able to answer question with no SOB.  No acute distress.     HEENT: Head is normocephalic and atraumatic. No nasal tenderness. Extraocular muscles are intact. Mucous membranes are moist.     NECK: Supple, no palpable adenopathy.    LUNGS: Fair air entrance. Clear to auscultation bilaterally with no wheezing, rales  . Non labored. No use of accessory muscle.     HEART: S1 S2 Regular rate and no click./rub.     ABDOMEN: Soft, nontender, and nondistended.  No hepatosplenomegaly is noted. Active bowel sound.     EXTREMITIES: Without any cyanosis, clubbing, rash, lesions or edema.    NEUROLOGIC: Awake, alert, oriented.     SKIN: Warm and moist. Non diaphoretic.     LABS:                        12.5   12.14 )-----------( 131      ( 14 Jul 2021 10:19 )             41.7     07-14    138  |  96  |  18  ----------------------------<  274<H>  5.2   |  38<H>  |  0.63    Ca    8.4      14 Jul 2021 10:19                          MICROBIOLOGY: (if applicable)    RADIOLOGY & ADDITIONAL STUDIES:  EKG:   CXR:  ECHO:    IMPRESSION: 60y Male PAST MEDICAL & SURGICAL HISTORY:  COPD (chronic obstructive pulmonary disease)    Stented coronary artery    HLD (hyperlipidemia)    Pulmonary HTN    Type 2 diabetes mellitus without complication, with long-term current use of insulin    Coronary artery disease involving native coronary artery of native heart without angina pectoris    Bipolar 1 disorder    Smoker    Gastroesophageal reflux disease, esophagitis presence not specified    Oxygen dependent    COPD (chronic obstructive pulmonary disease)    DM (diabetes mellitus)    CAD (coronary artery disease)    GERD (gastroesophageal reflux disease)    HLD (hyperlipidemia)    Insomnia    Polyarthritis    No significant past surgical history    Impression; 61 Y/O male presented in ED with SOB. Has COPD and uses Oxygen supplementation at Home. Was admitted to ICU for acute on chronic hypoxic hypercapnic respiratory  failure due to acute exacerbation  of COPD requiring NIPPV. Downgraded to Medicine Unit on 07-09-21. 07-14-21, 07-11-21 Negative for Covid 19 PCR.     Suggestion:  O2 saturation 96% room air . Per patient he is feeling much better and no need for continuous Oxygen supplementation.     Continue PRN Albuterol 2 puffs Q 6 Hours.  Continue Budesonide 2 puffs Twice daily.   Continue Prednisone 20 mg oral daily  .   Continue tiotropium 18 mcg Daily.   Pulmonary oral hygiene care.   DVT/ GI prophylactic.   Reinforced the importance of smoking cessation and Trilogy was ordered outpatient and reinforced the importance of daily compliance to prevent hospitalization    .    Time of Visit:  Patient seen and examined.     MEDICATIONS  (STANDING):  ALPRAZolam 0.5 milliGRAM(s) Oral two times a day  aspirin  chewable 81 milliGRAM(s) Oral daily  budesonide 160 MICROgram(s)/formoterol 4.5 MICROgram(s) Inhaler 2 Puff(s) Inhalation two times a day  chlorhexidine 2% Cloths 1 Application(s) Topical <User Schedule>  enoxaparin Injectable 40 milliGRAM(s) SubCutaneous daily  insulin lispro (ADMELOG) corrective regimen sliding scale   SubCutaneous at bedtime  insulin lispro (ADMELOG) corrective regimen sliding scale   SubCutaneous three times a day before meals  lidocaine   Patch 1 Patch Transdermal daily  melatonin 3 milliGRAM(s) Oral at bedtime  multivitamin/minerals 1 Tablet(s) Oral daily  nicotine -   7 mG/24Hr(s) Patch 1 patch Transdermal daily  OLANZapine Injectable 5 milliGRAM(s) IntraMuscular every 12 hours  pantoprazole    Tablet 40 milliGRAM(s) Oral before breakfast  polyethylene glycol 3350 17 Gram(s) Oral daily  predniSONE   Tablet 20 milliGRAM(s) Oral daily  risperiDONE   Tablet 1 milliGRAM(s) Oral daily  tiotropium 18 MICROgram(s) Capsule 1 Capsule(s) Inhalation daily  traZODone 150 milliGRAM(s) Oral at bedtime      MEDICATIONS  (PRN):  ALBUTerol    90 MICROgram(s) HFA Inhaler 2 Puff(s) Inhalation every 6 hours PRN Shortness of Breath and/or Wheezing  albuterol/ipratropium for Nebulization 3 milliLiter(s) Nebulizer every 6 hours PRN Shortness of Breath and/or Wheezing  oxycodone    5 mG/acetaminophen 325 mG 2 Tablet(s) Oral every 6 hours PRN Severe Pain (7 - 10)       Medications up to date at time of exam.    ROS; No fever, chills, cough, congestion. Denies SOB on exam.   PHYSICAL EXAMINATION:    Vital Signs Last 24 Hrs  T(C): 36.8 (15 Jul 2021 05:23), Max: 36.8 (15 Jul 2021 05:23)  T(F): 98.2 (15 Jul 2021 05:23), Max: 98.2 (15 Jul 2021 05:23)  HR: 90 (15 Jul 2021 05:23) (90 - 94)  BP: 103/67 (15 Jul 2021 05:23) (103/67 - 106/70)  BP(mean): --  RR: 18 (15 Jul 2021 05:23) (18 - 18)  SpO2: 95% (15 Jul 2021 05:23) (95% - 95%)   (if applicable)    General: Alert and oriented. Able to answer question with no SOB.  No acute distress.     HEENT: Head is normocephalic and atraumatic. No nasal tenderness. Extraocular muscles are intact. Mucous membranes are moist.     NECK: Supple, no palpable adenopathy.    LUNGS: Fair air entrance. Clear to auscultation bilaterally with no wheezing, rales  . Non labored. No use of accessory muscle.     HEART: S1 S2 Regular rate and no click./rub.     ABDOMEN: Soft, nontender, and nondistended.  No hepatosplenomegaly is noted. Active bowel sound.     EXTREMITIES: Without any cyanosis, clubbing, rash, lesions or edema.    NEUROLOGIC: Awake, alert, oriented.     SKIN: Warm and moist. Non diaphoretic.     LABS:                        12.5   12.14 )-----------( 131      ( 14 Jul 2021 10:19 )             41.7     07-14    138  |  96  |  18  ----------------------------<  274<H>  5.2   |  38<H>  |  0.63    Ca    8.4      14 Jul 2021 10:19                          MICROBIOLOGY: (if applicable)    RADIOLOGY & ADDITIONAL STUDIES:  EKG:   CXR:  ECHO:    IMPRESSION: 60y Male PAST MEDICAL & SURGICAL HISTORY:  COPD (chronic obstructive pulmonary disease)    Stented coronary artery    HLD (hyperlipidemia)    Pulmonary HTN    Type 2 diabetes mellitus without complication, with long-term current use of insulin    Coronary artery disease involving native coronary artery of native heart without angina pectoris    Bipolar 1 disorder    Smoker    Gastroesophageal reflux disease, esophagitis presence not specified    Oxygen dependent    COPD (chronic obstructive pulmonary disease)    DM (diabetes mellitus)    CAD (coronary artery disease)    GERD (gastroesophageal reflux disease)    HLD (hyperlipidemia)    Insomnia    Polyarthritis    No significant past surgical history    Impression; 61 Y/O male presented in ED with SOB. Has COPD and uses Oxygen supplementation at Home. Was admitted to ICU for acute on chronic hypoxic hypercapnic respiratory  failure due to acute exacerbation  of COPD requiring NIPPV. Downgraded to Medicine Unit on 07-09-21. 07-14-21, 07-11-21 Negative for Covid 19 PCR.     Suggestion:  O2 saturation 96% room air . Per patient he is feeling much better and no need for continuous Oxygen supplementation.     Continue PRN Albuterol 2 puffs Q 6 Hours.  Continue Budesonide 2 puffs Twice daily.   Continue Prednisone 20 mg oral daily  .   Continue tiotropium 18 mcg Daily.   Pulmonary oral hygiene care.   DVT/ GI prophylactic.   Reinforced the importance of smoking cessation and Trilogy was ordered outpatient and reinforced the importance of daily compliance to prevent hospitalization    .    Agree with above assessment and plan as transcribed.

## 2021-07-15 NOTE — PROGRESS NOTE ADULT - PROVIDER SPECIALTY LIST ADULT
Critical Care
Critical Care
Internal Medicine
Critical Care
Internal Medicine
Pulmonology
Critical Care
Pulmonology
Internal Medicine
Internal Medicine

## 2021-07-15 NOTE — PROGRESS NOTE ADULT - NSICDXPILOT_GEN_ALL_CORE
[place]

## 2021-07-15 NOTE — DISCHARGE NOTE NURSING/CASE MANAGEMENT/SOCIAL WORK - PATIENT PORTAL LINK FT
You can access the FollowMyHealth Patient Portal offered by [facility] by registering at the following website: [email or web address]. By joining [employer]’s FollowMyHealth portal, you will also be able to view your health information using other applications (apps) compatible with our system.

## 2021-07-24 ENCOUNTER — EMERGENCY (EMERGENCY)
Facility: HOSPITAL | Age: 61
LOS: 1 days | Discharge: HOME | End: 2021-07-24
Attending: EMERGENCY MEDICINE
Payer: MEDICAID

## 2021-07-24 VITALS
RESPIRATION RATE: 18 BRPM | SYSTOLIC BLOOD PRESSURE: 108 MMHG | HEART RATE: 114 BPM | TEMPERATURE: 98 F | DIASTOLIC BLOOD PRESSURE: 63 MMHG | OXYGEN SATURATION: 95 %

## 2021-07-24 VITALS
SYSTOLIC BLOOD PRESSURE: 103 MMHG | RESPIRATION RATE: 18 BRPM | HEART RATE: 113 BPM | OXYGEN SATURATION: 96 % | HEIGHT: 68 IN | TEMPERATURE: 98 F | DIASTOLIC BLOOD PRESSURE: 67 MMHG

## 2021-07-24 LAB
ALBUMIN SERPL ELPH-MCNC: 3.2 G/DL — LOW (ref 3.5–5)
ALP SERPL-CCNC: 68 U/L — SIGNIFICANT CHANGE UP (ref 40–120)
ALT FLD-CCNC: 31 U/L DA — SIGNIFICANT CHANGE UP (ref 10–60)
ANION GAP SERPL CALC-SCNC: 1 MMOL/L — LOW (ref 5–17)
AST SERPL-CCNC: 19 U/L — SIGNIFICANT CHANGE UP (ref 10–40)
BASOPHILS # BLD AUTO: 0.03 K/UL — SIGNIFICANT CHANGE UP (ref 0–0.2)
BASOPHILS NFR BLD AUTO: 0.2 % — SIGNIFICANT CHANGE UP (ref 0–2)
BILIRUB SERPL-MCNC: 0.3 MG/DL — SIGNIFICANT CHANGE UP (ref 0.2–1.2)
BUN SERPL-MCNC: 16 MG/DL — SIGNIFICANT CHANGE UP (ref 7–18)
CALCIUM SERPL-MCNC: 8.7 MG/DL — SIGNIFICANT CHANGE UP (ref 8.4–10.5)
CHLORIDE SERPL-SCNC: 96 MMOL/L — SIGNIFICANT CHANGE UP (ref 96–108)
CO2 SERPL-SCNC: 41 MMOL/L — HIGH (ref 22–31)
CREAT SERPL-MCNC: 0.5 MG/DL — SIGNIFICANT CHANGE UP (ref 0.5–1.3)
EOSINOPHIL # BLD AUTO: 0.11 K/UL — SIGNIFICANT CHANGE UP (ref 0–0.5)
EOSINOPHIL NFR BLD AUTO: 0.8 % — SIGNIFICANT CHANGE UP (ref 0–6)
GLUCOSE SERPL-MCNC: 87 MG/DL — SIGNIFICANT CHANGE UP (ref 70–99)
HCT VFR BLD CALC: 42.5 % — SIGNIFICANT CHANGE UP (ref 39–50)
HGB BLD-MCNC: 12.7 G/DL — LOW (ref 13–17)
IMM GRANULOCYTES NFR BLD AUTO: 0.5 % — SIGNIFICANT CHANGE UP (ref 0–1.5)
LYMPHOCYTES # BLD AUTO: 1.49 K/UL — SIGNIFICANT CHANGE UP (ref 1–3.3)
LYMPHOCYTES # BLD AUTO: 11.4 % — LOW (ref 13–44)
MCHC RBC-ENTMCNC: 28.2 PG — SIGNIFICANT CHANGE UP (ref 27–34)
MCHC RBC-ENTMCNC: 29.9 GM/DL — LOW (ref 32–36)
MCV RBC AUTO: 94.4 FL — SIGNIFICANT CHANGE UP (ref 80–100)
MONOCYTES # BLD AUTO: 0.8 K/UL — SIGNIFICANT CHANGE UP (ref 0–0.9)
MONOCYTES NFR BLD AUTO: 6.1 % — SIGNIFICANT CHANGE UP (ref 2–14)
NEUTROPHILS # BLD AUTO: 10.55 K/UL — HIGH (ref 1.8–7.4)
NEUTROPHILS NFR BLD AUTO: 81 % — HIGH (ref 43–77)
NRBC # BLD: 0 /100 WBCS — SIGNIFICANT CHANGE UP (ref 0–0)
PLATELET # BLD AUTO: 132 K/UL — LOW (ref 150–400)
POTASSIUM SERPL-MCNC: 4.2 MMOL/L — SIGNIFICANT CHANGE UP (ref 3.5–5.3)
POTASSIUM SERPL-SCNC: 4.2 MMOL/L — SIGNIFICANT CHANGE UP (ref 3.5–5.3)
PROT SERPL-MCNC: 6.6 G/DL — SIGNIFICANT CHANGE UP (ref 6–8.3)
RBC # BLD: 4.5 M/UL — SIGNIFICANT CHANGE UP (ref 4.2–5.8)
RBC # FLD: 13.6 % — SIGNIFICANT CHANGE UP (ref 10.3–14.5)
SODIUM SERPL-SCNC: 138 MMOL/L — SIGNIFICANT CHANGE UP (ref 135–145)
WBC # BLD: 13.05 K/UL — HIGH (ref 3.8–10.5)
WBC # FLD AUTO: 13.05 K/UL — HIGH (ref 3.8–10.5)

## 2021-07-24 PROCEDURE — 99285 EMERGENCY DEPT VISIT HI MDM: CPT

## 2021-07-24 PROCEDURE — 73610 X-RAY EXAM OF ANKLE: CPT | Mod: 26,LT

## 2021-07-24 PROCEDURE — 71045 X-RAY EXAM CHEST 1 VIEW: CPT | Mod: 26

## 2021-07-24 RX ORDER — OXYCODONE AND ACETAMINOPHEN 5; 325 MG/1; MG/1
2 TABLET ORAL ONCE
Refills: 0 | Status: DISCONTINUED | OUTPATIENT
Start: 2021-07-24 | End: 2021-07-24

## 2021-07-24 RX ADMIN — OXYCODONE AND ACETAMINOPHEN 2 TABLET(S): 5; 325 TABLET ORAL at 19:36

## 2021-07-24 NOTE — ED PROVIDER NOTE - OBJECTIVE STATEMENT
Patient reports 2 days of pain and swelling in left ankle. Diagnosed with ankle fracture at assisted living. Does not recall any fall or injury. No fever, cp, sob, ap, n/v/d. Patient on oxygen 2.5LNC chronically.

## 2021-07-24 NOTE — ED PROVIDER NOTE - PATIENT PORTAL LINK FT
You can access the FollowMyHealth Patient Portal offered by [facility] by registering at the following website: [email or web address]. By joining [employer]’s FollowMyHealth portal, you will also be able to view your health information using other applications (apps) compatible with our system.

## 2021-07-24 NOTE — ED PROVIDER NOTE - MUSCULOSKELETAL MINIMAL EXAM
left ankle swelling, mild tenderness over lateral maleolus. neurovascular and tendon intact distal to injury

## 2021-07-24 NOTE — ED ADULT NURSE NOTE - CADM POA CENTRAL LINE
No
Consent: The patient's consent was obtained including but not limited to risks of crusting, scabbing, blistering, scarring, darker or lighter pigmentary change, recurrence, incomplete removal and infection.
Anesthesia Type: 2% lidocaine with epinephrine
Detail Level: Simple
Anesthesia Volume In Cc: 0
Post-Care Instructions: I reviewed with the patient in detail post-care instructions. Patient is to wear sunprotection, and avoid picking at any of the treated lesions. Pt may apply Vaseline to crusted or scabbing areas.

## 2021-07-24 NOTE — ED PROVIDER NOTE - NSFOLLOWUPINSTRUCTIONS_ED_ALL_ED_FT
Nondisplaced Fibular Ankle Fracture Treated With Immobilization, Adult    A nondisplaced fibular ankle fracture is a simple break of the bottom of the fibula (lateral malleolus). The fibula is a bone in the lower leg, between the knee and the foot. In a nondisplaced fracture, the pieces of the broken bone line up with each other and are not out of place. This condition usually does not need surgery and can be treated with a splint or cast.     What are the causes?  This condition may be caused by:    A hard, direct hit (blow) or injury to the side of the leg.  A powerful twisting or rotating movement.  Rolling the ankle.  Falling or tripping.    What increases the risk?  You are more likely to develop this condition if:    You play sports that involve a lot of running and pivoting, such as basketball.  You play impact sports, such as football or soccer.  You smoke.  You have diabetes.  You have a history of ankle fractures.  You are obese.    What are the signs or symptoms?  Symptoms of this condition include:    Severe pain that begins immediately after the injury.  Bruising.  Swelling.  Inability to put weight on the injured ankle.  An ankle that is tender to the touch.    How is this diagnosed?  This condition is diagnosed based on:    Your medical history.  A physical exam.  Imaging tests to confirm the fracture and to evaluate the extent of the injury. These tests may include:  X-rays.  Stress X-ray. During this test, your health care provider will put pressure on your ankle while taking an X-ray. This will help to determine whether your ankle is stable.  CT scan.  MRI.    How is this treated?  This condition may be treated with:    A splint.  Icing and raising (elevating) the ankle.  A cast.  A removable cast or walking "boot."  Crutches. These may be needed to help you get around.    Follow these instructions at home:  Managing pain, stiffness, and swelling    If directed, put ice on the injured area.  If you have a removable splint or cast, remove it as told by your health care provider.  Put ice in a plastic bag.  Place a towel between your skin and the bag, or between your cast and the bag.  Leave the ice on for 20 minutes, 2–3 times a day.  Raise (elevate) the injured area above the level of your heart while you are sitting or lying down.  Move your toes often to avoid stiffness and to lessen swelling.  Use crutches as directed. Resume walking without crutches as directed by your health care provider or when you are comfortable doing that.     If you have a removable splint or cast:    Wear the removable splint or cast as told by your health care provider. Remove it only as told by your health care provider.   Loosen the splint or cast if your toes tingle, become numb, or turn cold and blue.  Keep the splint or cast clean.  If the splint or cast is not waterproof:  Do not let it get wet.  Cover it with a watertight covering when you take a bath or a shower.    If you have a cast that cannot be removed:    Do not stick anything inside the cast to scratch your skin. Doing that increases your risk of infection.  Check the skin around the cast every day. Contact your health care provider if you notice any redness, irritation, or swelling.   You may put lotion on dry skin around the edges of the cast. Do not put lotion on the skin underneath the cast.   Keep the cast clean.  Do not break off edges or trim your cast.  If the cast is not waterproof:  Do not let it get wet.  Cover it with a watertight covering when you take a bath or a shower.    Activity    Do exercises and stretches as told by your health care provider.  Ask your health care provider when it is safe to drive if you have a cast or splint on your ankle.  Do not drive or use heavy machinery while taking prescription pain medicine.    General instructions    Take over-the-counter and prescription medicines only as told by your health care provider.  Do not take baths, swim, or use a hot tub until your health care provider approves. Ask your health care provider if you can take showers. You may only be allowed to take sponge baths for bathing.  Do not use the injured leg to support your body weight until your health care provider says that you can. Use crutches as told by your health care provider.  Do not use any products that contain nicotine or tobacco, such as cigarettes and e-cigarettes. These can delay bone healing. If you need help quitting, ask your health care provider.  Keep all follow-up visits as told by your health care provider. This is important.     Contact a health care provider if:  Your cast gets damaged or it breaks.  Your pain does not get better with medicine.    Get help right away if:  You develop severe pain or more swelling in your ankle or foot that cannot be controlled with medicines.  Your skin or nails below the injury turn blue or gray, feel cold, or become numb.  The skin under your cast burns or stings.  There is a bad smell or pus coming from under the cast.  You cannot move your toes.    Summary  A nondisplaced fibular ankle fracture is a simple break of the bottom of a bone in the lower leg (fibula).  This condition may be treated with a splint, icing and elevation, a cast, or a removable cast or walking "boot." You may also need crutches to get around while your ankle heals.  To help manage pain, stiffness, swelling, put ice on the injured area as directed by your health care provider.  You should not use the injured leg to support your body weight until your health care provider says that you can. Use crutches as told by your health care provider.    ADDITIONAL NOTES AND INSTRUCTIONS    Please follow up with your Primary MD in 24-48 hr.  Seek immediate medical care for any new/worsening signs or symptoms.     Document Released: 9/9/2003 Document Revised: 11/27/2017 Document Reviewed: 11/27/2017  [employer] Patient Education ©2019 [employer] This information is not intended to replace advice given to you by your health care provider. Make sure you discuss any questions you have with your health care provider.

## 2021-07-24 NOTE — ED PROVIDER NOTE - PROGRESS NOTE DETAILS
SPoke with Dr. Ramos. He requested admission. Patient insistent on leaving, started to get out of bed. Sanjuana casiano called. I spoke with Dr. Ramos again. He will take patient back to [facility]. non-displaced fracture of lateral malleolus can be weight bearing with air cast. nurse to apply air cast. Spoke with patient's sister. Updated her on patient's condition. Patient is tachycardic. Patient states he is always tachycardic. I recommended further w/u for tachycardia. patient refused. I spoke with Dr. Ramos. He confirmed that patient is often tachycardic due to multiple bronchodilators. Can return to [facility].

## 2021-07-25 ENCOUNTER — INPATIENT (INPATIENT)
Facility: HOSPITAL | Age: 61
LOS: 7 days | Discharge: NURSING, REHAB OR LONG-TERM CARE | DRG: 208 | End: 2021-08-02
Attending: INTERNAL MEDICINE | Admitting: INTERNAL MEDICINE
Payer: MEDICAID

## 2021-07-25 VITALS
SYSTOLIC BLOOD PRESSURE: 118 MMHG | OXYGEN SATURATION: 96 % | DIASTOLIC BLOOD PRESSURE: 79 MMHG | HEIGHT: 68 IN | RESPIRATION RATE: 25 BRPM | WEIGHT: 199.96 LBS | TEMPERATURE: 97 F | HEART RATE: 113 BPM

## 2021-07-25 DIAGNOSIS — J44.9 CHRONIC OBSTRUCTIVE PULMONARY DISEASE, UNSPECIFIED: ICD-10-CM

## 2021-07-25 LAB
ALBUMIN SERPL ELPH-MCNC: 3 G/DL — LOW (ref 3.5–5)
ALP SERPL-CCNC: 64 U/L — SIGNIFICANT CHANGE UP (ref 40–120)
ALT FLD-CCNC: 28 U/L DA — SIGNIFICANT CHANGE UP (ref 10–60)
ANION GAP SERPL CALC-SCNC: 0 MMOL/L — LOW (ref 5–17)
ANION GAP SERPL CALC-SCNC: 3 MMOL/L — LOW (ref 5–17)
AST SERPL-CCNC: 17 U/L — SIGNIFICANT CHANGE UP (ref 10–40)
BASE EXCESS BLDA CALC-SCNC: 12.5 MMOL/L — HIGH (ref -2–3)
BASE EXCESS BLDA CALC-SCNC: 12.6 MMOL/L — HIGH (ref -2–3)
BASE EXCESS BLDV CALC-SCNC: 15.6 MMOL/L — SIGNIFICANT CHANGE UP
BASE EXCESS BLDV CALC-SCNC: 15.7 MMOL/L — SIGNIFICANT CHANGE UP
BASOPHILS # BLD AUTO: 0.03 K/UL — SIGNIFICANT CHANGE UP (ref 0–0.2)
BASOPHILS NFR BLD AUTO: 0.2 % — SIGNIFICANT CHANGE UP (ref 0–2)
BILIRUB SERPL-MCNC: 0.4 MG/DL — SIGNIFICANT CHANGE UP (ref 0.2–1.2)
BLOOD GAS COMMENTS ARTERIAL: SIGNIFICANT CHANGE UP
BLOOD GAS COMMENTS ARTERIAL: SIGNIFICANT CHANGE UP
BUN SERPL-MCNC: 13 MG/DL — SIGNIFICANT CHANGE UP (ref 7–18)
BUN SERPL-MCNC: 16 MG/DL — SIGNIFICANT CHANGE UP (ref 7–18)
CALCIUM SERPL-MCNC: 8.5 MG/DL — SIGNIFICANT CHANGE UP (ref 8.4–10.5)
CALCIUM SERPL-MCNC: 8.8 MG/DL — SIGNIFICANT CHANGE UP (ref 8.4–10.5)
CHLORIDE SERPL-SCNC: 94 MMOL/L — LOW (ref 96–108)
CHLORIDE SERPL-SCNC: 97 MMOL/L — SIGNIFICANT CHANGE UP (ref 96–108)
CO2 SERPL-SCNC: 41 MMOL/L — HIGH (ref 22–31)
CO2 SERPL-SCNC: 42 MMOL/L — HIGH (ref 22–31)
CREAT SERPL-MCNC: 0.42 MG/DL — LOW (ref 0.5–1.3)
CREAT SERPL-MCNC: 0.56 MG/DL — SIGNIFICANT CHANGE UP (ref 0.5–1.3)
EOSINOPHIL # BLD AUTO: 0.23 K/UL — SIGNIFICANT CHANGE UP (ref 0–0.5)
EOSINOPHIL NFR BLD AUTO: 1.8 % — SIGNIFICANT CHANGE UP (ref 0–6)
GLUCOSE BLDC GLUCOMTR-MCNC: 140 MG/DL — HIGH (ref 70–99)
GLUCOSE BLDC GLUCOMTR-MCNC: 172 MG/DL — HIGH (ref 70–99)
GLUCOSE BLDC GLUCOMTR-MCNC: 98 MG/DL — SIGNIFICANT CHANGE UP (ref 70–99)
GLUCOSE SERPL-MCNC: 104 MG/DL — HIGH (ref 70–99)
GLUCOSE SERPL-MCNC: 140 MG/DL — HIGH (ref 70–99)
HCO3 BLDA-SCNC: 40 MMOL/L — HIGH (ref 21–28)
HCO3 BLDA-SCNC: 43 MMOL/L — HIGH (ref 21–28)
HCO3 BLDV-SCNC: 44 MMOL/L — HIGH (ref 22–29)
HCO3 BLDV-SCNC: 48 MMOL/L — CRITICAL HIGH (ref 22–29)
HCT VFR BLD CALC: 43.3 % — SIGNIFICANT CHANGE UP (ref 39–50)
HGB BLD-MCNC: 12.6 G/DL — LOW (ref 13–17)
HOROWITZ INDEX BLDA+IHG-RTO: 50 — SIGNIFICANT CHANGE UP
HOROWITZ INDEX BLDA+IHG-RTO: 60 — SIGNIFICANT CHANGE UP
HOROWITZ INDEX BLDV+IHG-RTO: 50 — SIGNIFICANT CHANGE UP
IMM GRANULOCYTES NFR BLD AUTO: 0.6 % — SIGNIFICANT CHANGE UP (ref 0–1.5)
LYMPHOCYTES # BLD AUTO: 1.49 K/UL — SIGNIFICANT CHANGE UP (ref 1–3.3)
LYMPHOCYTES # BLD AUTO: 11.5 % — LOW (ref 13–44)
MCHC RBC-ENTMCNC: 27.9 PG — SIGNIFICANT CHANGE UP (ref 27–34)
MCHC RBC-ENTMCNC: 29.1 GM/DL — LOW (ref 32–36)
MCV RBC AUTO: 95.8 FL — SIGNIFICANT CHANGE UP (ref 80–100)
MONOCYTES # BLD AUTO: 0.98 K/UL — HIGH (ref 0–0.9)
MONOCYTES NFR BLD AUTO: 7.5 % — SIGNIFICANT CHANGE UP (ref 2–14)
NEUTROPHILS # BLD AUTO: 10.18 K/UL — HIGH (ref 1.8–7.4)
NEUTROPHILS NFR BLD AUTO: 78.4 % — HIGH (ref 43–77)
NRBC # BLD: 0 /100 WBCS — SIGNIFICANT CHANGE UP (ref 0–0)
NT-PROBNP SERPL-SCNC: 211 PG/ML — HIGH (ref 0–125)
PCO2 BLDA: 65 MMHG — HIGH (ref 35–48)
PCO2 BLDA: 90 MMHG — CRITICAL HIGH (ref 35–48)
PCO2 BLDV: 107 MMHG — HIGH (ref 42–55)
PCO2 BLDV: 67 MMHG — HIGH (ref 42–55)
PH BLDA: 7.29 — LOW (ref 7.35–7.45)
PH BLDA: 7.4 — SIGNIFICANT CHANGE UP (ref 7.35–7.45)
PH BLDV: 7.26 — LOW (ref 7.32–7.43)
PH BLDV: 7.42 — SIGNIFICANT CHANGE UP (ref 7.32–7.43)
PLATELET # BLD AUTO: 145 K/UL — LOW (ref 150–400)
PO2 BLDA: 122 MMHG — HIGH (ref 83–108)
PO2 BLDA: 78 MMHG — LOW (ref 83–108)
PO2 BLDV: 42 MMHG — SIGNIFICANT CHANGE UP
PO2 BLDV: 51 MMHG — SIGNIFICANT CHANGE UP
POTASSIUM SERPL-MCNC: 4.1 MMOL/L — SIGNIFICANT CHANGE UP (ref 3.5–5.3)
POTASSIUM SERPL-MCNC: 4.3 MMOL/L — SIGNIFICANT CHANGE UP (ref 3.5–5.3)
POTASSIUM SERPL-SCNC: 4.1 MMOL/L — SIGNIFICANT CHANGE UP (ref 3.5–5.3)
POTASSIUM SERPL-SCNC: 4.3 MMOL/L — SIGNIFICANT CHANGE UP (ref 3.5–5.3)
PROT SERPL-MCNC: 6.3 G/DL — SIGNIFICANT CHANGE UP (ref 6–8.3)
RBC # BLD: 4.52 M/UL — SIGNIFICANT CHANGE UP (ref 4.2–5.8)
RBC # FLD: 13.7 % — SIGNIFICANT CHANGE UP (ref 10.3–14.5)
SAO2 % BLDA: 96 % — SIGNIFICANT CHANGE UP
SAO2 % BLDA: 98 % — SIGNIFICANT CHANGE UP
SAO2 % BLDV: 79.1 % — SIGNIFICANT CHANGE UP
SAO2 % BLDV: 81.4 % — SIGNIFICANT CHANGE UP
SARS-COV-2 RNA SPEC QL NAA+PROBE: SIGNIFICANT CHANGE UP
SODIUM SERPL-SCNC: 138 MMOL/L — SIGNIFICANT CHANGE UP (ref 135–145)
SODIUM SERPL-SCNC: 139 MMOL/L — SIGNIFICANT CHANGE UP (ref 135–145)
TROPONIN I SERPL-MCNC: 0.02 NG/ML — SIGNIFICANT CHANGE UP (ref 0–0.04)
WBC # BLD: 12.99 K/UL — HIGH (ref 3.8–10.5)
WBC # FLD AUTO: 12.99 K/UL — HIGH (ref 3.8–10.5)

## 2021-07-25 PROCEDURE — 99291 CRITICAL CARE FIRST HOUR: CPT

## 2021-07-25 PROCEDURE — 71045 X-RAY EXAM CHEST 1 VIEW: CPT | Mod: 26

## 2021-07-25 PROCEDURE — 71045 X-RAY EXAM CHEST 1 VIEW: CPT | Mod: 26,77,76

## 2021-07-25 RX ORDER — TRAZODONE HCL 50 MG
150 TABLET ORAL AT BEDTIME
Refills: 0 | Status: DISCONTINUED | OUTPATIENT
Start: 2021-07-25 | End: 2021-08-02

## 2021-07-25 RX ORDER — FENTANYL CITRATE 50 UG/ML
0.5 INJECTION INTRAVENOUS
Qty: 2500 | Refills: 0 | Status: DISCONTINUED | OUTPATIENT
Start: 2021-07-25 | End: 2021-07-27

## 2021-07-25 RX ORDER — DEXMEDETOMIDINE HYDROCHLORIDE IN 0.9% SODIUM CHLORIDE 4 UG/ML
0.5 INJECTION INTRAVENOUS
Qty: 400 | Refills: 0 | Status: DISCONTINUED | OUTPATIENT
Start: 2021-07-25 | End: 2021-07-25

## 2021-07-25 RX ORDER — CHLORHEXIDINE GLUCONATE 213 G/1000ML
1 SOLUTION TOPICAL
Refills: 0 | Status: DISCONTINUED | OUTPATIENT
Start: 2021-07-25 | End: 2021-07-27

## 2021-07-25 RX ORDER — NOREPINEPHRINE BITARTRATE/D5W 8 MG/250ML
0.05 PLASTIC BAG, INJECTION (ML) INTRAVENOUS
Qty: 16 | Refills: 0 | Status: DISCONTINUED | OUTPATIENT
Start: 2021-07-25 | End: 2021-07-27

## 2021-07-25 RX ORDER — PANTOPRAZOLE SODIUM 20 MG/1
40 TABLET, DELAYED RELEASE ORAL DAILY
Refills: 0 | Status: DISCONTINUED | OUTPATIENT
Start: 2021-07-25 | End: 2021-07-30

## 2021-07-25 RX ORDER — ENOXAPARIN SODIUM 100 MG/ML
40 INJECTION SUBCUTANEOUS DAILY
Refills: 0 | Status: DISCONTINUED | OUTPATIENT
Start: 2021-07-25 | End: 2021-08-02

## 2021-07-25 RX ORDER — ALBUTEROL 90 UG/1
2 AEROSOL, METERED ORAL EVERY 6 HOURS
Refills: 0 | Status: DISCONTINUED | OUTPATIENT
Start: 2021-07-25 | End: 2021-07-27

## 2021-07-25 RX ORDER — MORPHINE SULFATE 50 MG/1
4 CAPSULE, EXTENDED RELEASE ORAL ONCE
Refills: 0 | Status: DISCONTINUED | OUTPATIENT
Start: 2021-07-25 | End: 2021-07-25

## 2021-07-25 RX ORDER — LACOSAMIDE 50 MG/1
200 TABLET ORAL EVERY 12 HOURS
Refills: 0 | Status: DISCONTINUED | OUTPATIENT
Start: 2021-07-25 | End: 2021-07-25

## 2021-07-25 RX ORDER — INSULIN LISPRO 100/ML
VIAL (ML) SUBCUTANEOUS EVERY 6 HOURS
Refills: 0 | Status: DISCONTINUED | OUTPATIENT
Start: 2021-07-25 | End: 2021-07-29

## 2021-07-25 RX ORDER — ETOMIDATE 2 MG/ML
20 INJECTION INTRAVENOUS ONCE
Refills: 0 | Status: COMPLETED | OUTPATIENT
Start: 2021-07-25 | End: 2021-07-25

## 2021-07-25 RX ORDER — RISPERIDONE 4 MG/1
1 TABLET ORAL DAILY
Refills: 0 | Status: DISCONTINUED | OUTPATIENT
Start: 2021-07-25 | End: 2021-08-02

## 2021-07-25 RX ORDER — BUDESONIDE AND FORMOTEROL FUMARATE DIHYDRATE 160; 4.5 UG/1; UG/1
2 AEROSOL RESPIRATORY (INHALATION)
Refills: 0 | Status: DISCONTINUED | OUTPATIENT
Start: 2021-07-25 | End: 2021-07-25

## 2021-07-25 RX ORDER — CHLORHEXIDINE GLUCONATE 213 G/1000ML
15 SOLUTION TOPICAL EVERY 12 HOURS
Refills: 0 | Status: DISCONTINUED | OUTPATIENT
Start: 2021-07-25 | End: 2021-07-28

## 2021-07-25 RX ORDER — PROPOFOL 10 MG/ML
140 INJECTION, EMULSION INTRAVENOUS ONCE
Refills: 0 | Status: COMPLETED | OUTPATIENT
Start: 2021-07-25 | End: 2021-07-25

## 2021-07-25 RX ORDER — ALBUTEROL 90 UG/1
2 AEROSOL, METERED ORAL EVERY 6 HOURS
Refills: 0 | Status: DISCONTINUED | OUTPATIENT
Start: 2021-07-25 | End: 2021-07-25

## 2021-07-25 RX ORDER — TIOTROPIUM BROMIDE 18 UG/1
1 CAPSULE ORAL; RESPIRATORY (INHALATION) DAILY
Refills: 0 | Status: DISCONTINUED | OUTPATIENT
Start: 2021-07-25 | End: 2021-07-27

## 2021-07-25 RX ORDER — LACOSAMIDE 50 MG/1
300 TABLET ORAL ONCE
Refills: 0 | Status: DISCONTINUED | OUTPATIENT
Start: 2021-07-25 | End: 2021-07-25

## 2021-07-25 RX ORDER — PHENYLEPHRINE HYDROCHLORIDE 10 MG/ML
0.3 INJECTION INTRAVENOUS
Qty: 160 | Refills: 0 | Status: DISCONTINUED | OUTPATIENT
Start: 2021-07-25 | End: 2021-07-25

## 2021-07-25 RX ORDER — PROPOFOL 10 MG/ML
10 INJECTION, EMULSION INTRAVENOUS
Qty: 1000 | Refills: 0 | Status: DISCONTINUED | OUTPATIENT
Start: 2021-07-25 | End: 2021-07-28

## 2021-07-25 RX ORDER — DEXTROSE 50 % IN WATER 50 %
12.5 SYRINGE (ML) INTRAVENOUS ONCE
Refills: 0 | Status: DISCONTINUED | OUTPATIENT
Start: 2021-07-25 | End: 2021-08-02

## 2021-07-25 RX ORDER — ASPIRIN/CALCIUM CARB/MAGNESIUM 324 MG
81 TABLET ORAL DAILY
Refills: 0 | Status: DISCONTINUED | OUTPATIENT
Start: 2021-07-25 | End: 2021-08-02

## 2021-07-25 RX ORDER — PHENYLEPHRINE HYDROCHLORIDE 10 MG/ML
2 INJECTION INTRAVENOUS
Qty: 40 | Refills: 0 | Status: DISCONTINUED | OUTPATIENT
Start: 2021-07-25 | End: 2021-07-27

## 2021-07-25 RX ORDER — SODIUM CHLORIDE 9 MG/ML
10 INJECTION INTRAMUSCULAR; INTRAVENOUS; SUBCUTANEOUS
Refills: 0 | Status: DISCONTINUED | OUTPATIENT
Start: 2021-07-25 | End: 2021-08-02

## 2021-07-25 RX ADMIN — Medication 150 MILLIGRAM(S): at 22:16

## 2021-07-25 RX ADMIN — Medication 1 DROP(S): at 22:17

## 2021-07-25 RX ADMIN — RISPERIDONE 1 MILLIGRAM(S): 4 TABLET ORAL at 18:42

## 2021-07-25 RX ADMIN — ETOMIDATE 20 MILLIGRAM(S): 2 INJECTION INTRAVENOUS at 12:24

## 2021-07-25 RX ADMIN — PHENYLEPHRINE HYDROCHLORIDE 68 MICROGRAM(S)/KG/MIN: 10 INJECTION INTRAVENOUS at 18:19

## 2021-07-25 RX ADMIN — Medication 81 MILLIGRAM(S): at 18:42

## 2021-07-25 RX ADMIN — ENOXAPARIN SODIUM 40 MILLIGRAM(S): 100 INJECTION SUBCUTANEOUS at 14:49

## 2021-07-25 RX ADMIN — PROPOFOL 5.44 MICROGRAM(S)/KG/MIN: 10 INJECTION, EMULSION INTRAVENOUS at 18:20

## 2021-07-25 RX ADMIN — PROPOFOL 5.44 MICROGRAM(S)/KG/MIN: 10 INJECTION, EMULSION INTRAVENOUS at 11:00

## 2021-07-25 RX ADMIN — PHENYLEPHRINE HYDROCHLORIDE 68 MICROGRAM(S)/KG/MIN: 10 INJECTION INTRAVENOUS at 12:25

## 2021-07-25 RX ADMIN — Medication 1: at 23:50

## 2021-07-25 RX ADMIN — CHLORHEXIDINE GLUCONATE 15 MILLILITER(S): 213 SOLUTION TOPICAL at 18:21

## 2021-07-25 RX ADMIN — Medication 40 MILLIGRAM(S): at 14:49

## 2021-07-25 RX ADMIN — Medication 40 MILLIGRAM(S): at 22:15

## 2021-07-25 RX ADMIN — PROPOFOL 5.44 MICROGRAM(S)/KG/MIN: 10 INJECTION, EMULSION INTRAVENOUS at 18:18

## 2021-07-25 RX ADMIN — PHENYLEPHRINE HYDROCHLORIDE 68 MICROGRAM(S)/KG/MIN: 10 INJECTION INTRAVENOUS at 18:20

## 2021-07-25 RX ADMIN — MORPHINE SULFATE 4 MILLIGRAM(S): 50 CAPSULE, EXTENDED RELEASE ORAL at 12:30

## 2021-07-25 RX ADMIN — MORPHINE SULFATE 4 MILLIGRAM(S): 50 CAPSULE, EXTENDED RELEASE ORAL at 12:24

## 2021-07-25 RX ADMIN — PROPOFOL 140 MILLIGRAM(S): 10 INJECTION, EMULSION INTRAVENOUS at 11:00

## 2021-07-25 RX ADMIN — PANTOPRAZOLE SODIUM 40 MILLIGRAM(S): 20 TABLET, DELAYED RELEASE ORAL at 14:49

## 2021-07-25 RX ADMIN — FENTANYL CITRATE 4.54 MICROGRAM(S)/KG/HR: 50 INJECTION INTRAVENOUS at 12:25

## 2021-07-25 NOTE — ED ADULT NURSE NOTE - NSIMPLEMENTINTERV_GEN_ALL_ED
Implemented All Fall Risk Interventions:  [place] to call system. Call bell, personal items and telephone within reach. Instruct patient to call for assistance. Room bathroom lighting operational. Non-slip footwear when patient is off stretcher. Physically safe environment: no spills, clutter or unnecessary equipment. Stretcher in lowest position, wheels locked, appropriate side rails in place. Provide visual cue, wrist band, yellow gown, etc. Monitor gait and stability. Monitor for mental status changes and reorient to person, place, and time. Review medications for side effects contributing to fall risk. Reinforce activity limits and safety measures with patient and family.

## 2021-07-25 NOTE — ED PROVIDER NOTE - NS ED ROS FT
ROS:  GENERAL: No fever, no chills  EYES: no change in vision  HEENT: no trouble swallowing, no trouble speaking  CARDIAC: no chest pain  PULMONARY: +SOB  GI: no abdominal pain, no nausea, no vomiting, no diarrhea, no constipation  : No dysuria, no frequency, no change in appearance, or odor of urine  SKIN: no rashes  NEURO: no headache, no weakness  MSK: No joint pain    Scot Yancey DO

## 2021-07-25 NOTE — ED PROVIDER NOTE - OBJECTIVE STATEMENT
60M with PMH including HTN, HLD, DM, COPD on 2.5L home 02 and night BIPAP (12/5 35%) presents from [facility] for shortness of breath. Pt was here last night for left ankle pain. Diagnosed with non-displaced left lateral malleolus fracture discharged with air cast. Admission was recommended given pt's tachycardia and SOB at the time but pt refused. States shortness of breath worsened overnight after being discharged. Denies any other symptoms including chest pain, fever, abd pain, N/V/D. Of note, pt was recent admitted to the ICU for hypoxic/hypercapnic respiratory failure 2/2 COPD.

## 2021-07-25 NOTE — H&P ADULT - ATTENDING COMMENTS
IMP: This is a 60 yr old morbid obese man active smoker with HTN, HLD, DM, CHF, CAD (w stents), chronic hypoxic resp failure due to  COPD  requiring supp O2 @ 2.5L / min via NC  night BIPAP (12/5 35%) presents from [facility] for shortness of breath. When EMS arrived pt was noted to be in respiratory distress and his portable O2 machine was uncharged and not running. Pt endorsed to ED physician his SOB has worsened overnight after being discharged 1 day PTA. Off note Pt was here last night for left ankle pain, He was diagnosed with non-displaced left lateral malleolus fracture. Pt was noted to be tachycardic and sob but refused admission and discharged with cast. Pt was  encephalopathic  in ED but Upon my examination pt was lethargic and unable to answer any questions. Pat was intubated for acute on chronic hypoxic hypercapnic resp failure .      ASSESSMENT;    - Acute on Chronic Hypoxic Hypercapnic Resp Failure  - COPD  - Acidosis .. Respiratory   - AMS/ Encephalopathy  - Shock ? cardiogenic   - DM uncontrol   - CAD/ CHF  - HTN , HTD  - Left Malleolar fx  - Active Smoker   - Morbid Obesity       Plan   -transfer to icu   -will intubate ( in icu )  -adjust vent as per ABG  -not a candidate for BiPaP due to AMS and lethargy   -sedated and pain control   -continue to titrate  pressors to maintain MAP>65  -hemodynamic support  -solumedrol   -albuterol   -no antibx   -NGT for feed   -foly's cath placement .. urine retention   -Flomax  -Podiatry eval .. Dr Abreu   -monitor blood sugar with coverage   -nicotine patch   -TLC for meds and access  -A-line for hemodynamic monitoring       discussed with icu team , dr rhodes and ER attend

## 2021-07-25 NOTE — H&P ADULT - NSHPPHYSICALEXAM_GEN_ALL_CORE
Vital Signs Last 24 Hrs  T(C): 36.2 (25 Jul 2021 11:00), Max: 36.9 (24 Jul 2021 16:45)  T(F): 97.1 (25 Jul 2021 11:00), Max: 98.5 (24 Jul 2021 16:45)  HR: 65 (25 Jul 2021 12:45) (64 - 114)  BP: 106/67 (25 Jul 2021 12:45) (53/37 - 137/64)  BP(mean): 77 (25 Jul 2021 12:45) (41 - 105)  RR: 16 (25 Jul 2021 12:45) (16 - 30)  SpO2: 100% (25 Jul 2021 12:45) (82% - 100%)      Physical exam:  GENERAL: in respiratory distress, using accessory muscles   HEAD:  Atraumatic, Normocephalic  EYES: EOMI, PERRLA, conjunctiva and sclera clear  ENT: dry mucous membranes  NECK: Supple, No JVD  CHEST/LUNG: decreased breath sounds   HEART: Regular rate and rhythm; S1+ S2+   ABDOMEN: Bowel sounds present; Soft, Nontender, Nondistended   EXTREMITIES:  2+ Peripheral Pulses, brisk capillary refill. 1+ B/L LE edema   NERVOUS SYSTEM:  awake but lethargic, non verbal upon my examination    MSK: FROM all 4 extremities,  SKIN: fungal infection in nails

## 2021-07-25 NOTE — PATIENT PROFILE ADULT - NSPROPASSIVESMOKEEXPOSURE_GEN_A_NUR
Pt called requesting to speak to Radha Judd  Pt stated he is was in the hospital with symptoms and need to speak to Radha Judd  No

## 2021-07-25 NOTE — CONSULT NOTE ADULT - SUBJECTIVE AND OBJECTIVE BOX
HPI:  61 yo M with PMH including HTN, HLD, DM, CHF, CAD (w stents), COPD on 2.5L home 02 and night BIPAP (12/5 35%) presents from [facility] for shortness of breath. When EMS arrived pt was noted to be in respiratory distress and his portable O2 machine was uncharged and not running. Pt endorsed to ED physician his SOB has worsened overnight after being discharged 1 day PTA. Off note Pt was here last night for left ankle pain, He was diagnosed with non-displaced left lateral malleolus fracture. Pt was noted to be tachycardic and sob but refused admission and discharged with cast. Pt was Ax0 x3 in ED but Upon my examination pt was lethargic and unable to answer any questions.  (25 Jul 2021 09:56)      PAST MEDICAL & SURGICAL HISTORY:  COPD (chronic obstructive pulmonary disease)    Stented coronary artery    HLD (hyperlipidemia)    Pulmonary HTN    Type 2 diabetes mellitus without complication, with long-term current use of insulin    Coronary artery disease involving native coronary artery of native heart without angina pectoris    Bipolar 1 disorder    Smoker    Gastroesophageal reflux disease, esophagitis presence not specified    Oxygen dependent    COPD (chronic obstructive pulmonary disease)    DM (diabetes mellitus)    CAD (coronary artery disease)    GERD (gastroesophageal reflux disease)    HLD (hyperlipidemia)    Insomnia    Polyarthritis    No significant past surgical history        No Known Allergies      Social Hx:    FAMILY HISTORY:  No family history of cardiovascular disease    No family history of chronic obstructive pulmonary disease    No family history of hypertension    No family history of mental disorder        ALBUTerol    90 MICROgram(s) HFA Inhaler 2 Puff(s) Inhalation every 6 hours PRN  artificial  tears Solution 1 Drop(s) Both EYES three times a day  aspirin  chewable 81 milliGRAM(s) Oral daily  chlorhexidine 0.12% Liquid 15 milliLiter(s) Oral Mucosa every 12 hours  dextrose 50% Injectable 12.5 Gram(s) IV Push once  enoxaparin Injectable 40 milliGRAM(s) SubCutaneous daily  fentaNYL   Infusion. 0.5 MICROgram(s)/kG/Hr IV Continuous <Continuous>  insulin lispro (ADMELOG) corrective regimen sliding scale   SubCutaneous every 6 hours  methylPREDNISolone sodium succinate Injectable 40 milliGRAM(s) IV Push three times a day  pantoprazole  Injectable 40 milliGRAM(s) IV Push daily  phenylephrine    Infusion 2 MICROgram(s)/kG/Min IV Continuous <Continuous>  propofol Infusion 10 MICROgram(s)/kG/Min IV Continuous <Continuous>  risperiDONE   Tablet 1 milliGRAM(s) Oral daily  tiotropium 18 MICROgram(s) Capsule 1 Capsule(s) Inhalation daily  traZODone 150 milliGRAM(s) Oral at bedtime      MEDICATIONS  (PRN):  ALBUTerol    90 MICROgram(s) HFA Inhaler 2 Puff(s) Inhalation every 6 hours PRN Respiratory Distress      T(C): 36.2 (07-25-21 @ 11:00), Max: 36.9 (07-24-21 @ 16:45)  HR: 67 (07-25-21 @ 16:17) (64 - 114)  BP: 106/67 (07-25-21 @ 16:15) (53/37 - 151/122)  RR: 16 (07-25-21 @ 16:15) (16 - 30)  SpO2: 100% (07-25-21 @ 16:17) (82% - 100%)        REVIEW OF SYSTEMS:                           ALL ROS DONE [ X   ]    CONSTITUTIONAL:  LETHARGIC [   ], FEVER [   ], UNRESPONSIVE [   ]  CVS:  CP  [   ], SOB, [   ], PALPITATIONS [   ], DIZZYNESS [   ]  RS: COUGH [   ], SPUTUM [   ]  GI: ABDOMINAL PAIN [   ], NAUSEA [   ], VOMITINGS [   ], DIARRHEA [   ], CONSTIPATION [   ]  :  DYSURIA [   ], NOCTURIA [   ], INCREASED FREQUENCY [   ], DRIBLING [   ],  SKELETAL: PAINFUL JOINTS [   ], SWOLLEN JOINTS [   ], NECK ACHE [   ], LOW BACK ACHE [   ],  SKIN : ULCERS [   ], RASH [   ], ITCHING [   ]  CNS: HEAD ACHE [   ], DOUBLE VISION [   ], BLURRED VISION [   ], AMS / CONFUSION [   ], SEIZURES [   ], WEAKNESS [   ],TINGLING / NUMBNESS [   ]    PHYSICAL EXAMINATION:  GENERAL APPEARANCE: NO DISTRESS  HEENT:  NO PALLOR, NO  JVD,  NO   NODES, NECK SUPPLE  CVS: S1 +, S2 +,   RS: AEEB,  OCCASIONAL  RALES +,   NO RONCHI  ABD: SOFT, NT, NO, BS +  EXT: NO PE  SKIN: WARM,   SKELETAL:  ROM ACCEPTABLE  CNS:  AAO X    ,   DEFICITS    RADIOLOGY :      ASSESSMENT :       PLAN:  HPI:  61 yo M with PMH including HTN, HLD, DM, CHF, CAD (w stents), COPD on 2.5L home 02 and night BIPAP (12/5 35%) presents from [facility] for shortness of breath. When EMS arrived pt was noted to be in respiratory distress and his portable O2 machine was uncharged and not running. Pt endorsed to ED physician his SOB has worsened overnight after being discharged 1 day PTA. Off note Pt was here last night for left ankle pain, He was diagnosed with non-displaced left lateral malleolus fracture. Pt was noted to be tachycardic and sob but refused admission and discharged with cast. Pt was Ax0 x3 in ED but Upon my examination pt was lethargic and unable to answer any questions.  (25 Jul 2021 09:56)    -      HPI:  61 yo M with PMH including HTN, HLD, DM, CHF, CAD (w stents), COPD on 2.5L home 02 and night BIPAP (12/5 35%) presents from [facility] for shortness of breath. When EMS arrived pt was noted to be in respiratory distress and his portable O2 machine was uncharged and not running. Pt endorsed to ED physician his SOB has worsened overnight after being discharged 1 day PTA. Off note Pt was here last night for left ankle pain, He was diagnosed with non-displaced left lateral malleolus fracture. Pt was noted to be tachycardic and sob but refused admission and discharged with cast. Pt was Ax0 x3 in ED but Upon my examination pt was lethargic and unable to answer any questions.  (25 Jul 2021 09:56)      PAST MEDICAL & SURGICAL HISTORY:  COPD (chronic obstructive pulmonary disease)    Stented coronary artery    HLD (hyperlipidemia)    Pulmonary HTN    Type 2 diabetes mellitus without complication, with long-term current use of insulin    Coronary artery disease involving native coronary artery of native heart without angina pectoris    Bipolar 1 disorder    Smoker    Gastroesophageal reflux disease, esophagitis presence not specified    Oxygen dependent    COPD (chronic obstructive pulmonary disease)    DM (diabetes mellitus)    CAD (coronary artery disease)    GERD (gastroesophageal reflux disease)    HLD (hyperlipidemia)    Insomnia    Polyarthritis    No significant past surgical history        No Known Allergies      Social Hx:    FAMILY HISTORY:  No family history of cardiovascular disease    No family history of chronic obstructive pulmonary disease    No family history of hypertension    No family history of mental disorder        ALBUTerol    90 MICROgram(s) HFA Inhaler 2 Puff(s) Inhalation every 6 hours PRN  artificial  tears Solution 1 Drop(s) Both EYES three times a day  aspirin  chewable 81 milliGRAM(s) Oral daily  chlorhexidine 0.12% Liquid 15 milliLiter(s) Oral Mucosa every 12 hours  dextrose 50% Injectable 12.5 Gram(s) IV Push once  enoxaparin Injectable 40 milliGRAM(s) SubCutaneous daily  fentaNYL   Infusion. 0.5 MICROgram(s)/kG/Hr IV Continuous <Continuous>  insulin lispro (ADMELOG) corrective regimen sliding scale   SubCutaneous every 6 hours  methylPREDNISolone sodium succinate Injectable 40 milliGRAM(s) IV Push three times a day  pantoprazole  Injectable 40 milliGRAM(s) IV Push daily  phenylephrine    Infusion 2 MICROgram(s)/kG/Min IV Continuous <Continuous>  propofol Infusion 10 MICROgram(s)/kG/Min IV Continuous <Continuous>  risperiDONE   Tablet 1 milliGRAM(s) Oral daily  tiotropium 18 MICROgram(s) Capsule 1 Capsule(s) Inhalation daily  traZODone 150 milliGRAM(s) Oral at bedtime      MEDICATIONS  (PRN):  ALBUTerol    90 MICROgram(s) HFA Inhaler 2 Puff(s) Inhalation every 6 hours PRN Respiratory Distress      T(C): 36.2 (07-25-21 @ 11:00), Max: 36.9 (07-24-21 @ 16:45)  HR: 67 (07-25-21 @ 16:17) (64 - 114)  BP: 106/67 (07-25-21 @ 16:15) (53/37 - 151/122)  RR: 16 (07-25-21 @ 16:15) (16 - 30)  SpO2: 100% (07-25-21 @ 16:17) (82% - 100%)        REVIEW OF SYSTEMS:                           ALL ROS DONE [ X   ]    CONSTITUTIONAL:  LETHARGIC [   ], FEVER [   ], UNRESPONSIVE [   ]  CVS:  CP  [   ], SOB, [   ], PALPITATIONS [   ], DIZZYNESS [   ]  RS: COUGH [   ], SPUTUM [   ]  GI: ABDOMINAL PAIN [   ], NAUSEA [   ], VOMITINGS [   ], DIARRHEA [   ], CONSTIPATION [   ]  :  DYSURIA [   ], NOCTURIA [   ], INCREASED FREQUENCY [   ], DRIBLING [   ],  SKELETAL: PAINFUL JOINTS [   ], SWOLLEN JOINTS [   ], NECK ACHE [   ], LOW BACK ACHE [   ],  SKIN : ULCERS [   ], RASH [   ], ITCHING [   ]  CNS: HEAD ACHE [   ], DOUBLE VISION [   ], BLURRED VISION [   ], AMS / CONFUSION [   ], SEIZURES [   ], WEAKNESS [   ],TINGLING / NUMBNESS [   ]    PHYSICAL EXAMINATION:  GENERAL APPEARANCE: NO DISTRESS  HEENT:  NO PALLOR, NO  JVD,  NO   NODES, NECK SUPPLE  CVS: S1 +, S2 +,   RS: AEEB,  OCCASIONAL  RALES +,   NO RONCHI  ABD: SOFT, NT, NO, BS +  EXT: NO PE  SKIN: WARM,   CVC+  SKELETAL:  ROM ACCEPTABLE, LEFT ANKLE   CNS:  AAO X 0-1, NO  DEFICITS    RADIOLOGY :    EXAM:  XR CHEST PORTABLE URGENT 1V                            PROCEDURE DATE:  07/25/2021          INTERPRETATION:  History: Shortness of breath. Difficulty breathing.    FINDINGS: Frontal chest.    COMPARISON: 7/24/2021.    Exam is limited due to collimation at the lung bases.    Heart size may be exaggerated by AP technique. Mild bronchovascular crowding at the bases. There is no focal lung consolidation or sizable pleural effusion. No significant osseous abnormality. Degenerative change of the spine.    IMPRESSION:    Unremarkable frontal chest.    ==================================================    EXAM:  XR ANKLE COMP MIN 3 VIEWS LT                            PROCEDURE DATE:  07/24/2021          INTERPRETATION:  History: Fall, ankle pain.    FINDINGS: Frontal, lateral and oblique projections of the left ankle.    Diffuse soft tissue swelling. Osteopenia. No fracture or dislocation. Ankle mortise is aligned. Extensive vascular calcification.    IMPRESSION:    No fracture or dislocation left ankle appreciated.      ASSESSMENT :       PLAN:  HPI:  61 yo M with PMH including HTN, HLD, DM, CHF, CAD (w stents), COPD on 2.5L home 02 and night BIPAP (12/5 35%) presents from [facility] for shortness of breath. When EMS arrived pt was noted to be in respiratory distress and his portable O2 machine was uncharged and not running. Pt endorsed to ED physician his SOB has worsened overnight after being discharged 1 day PTA. Off note Pt was here last night for left ankle pain, He was diagnosed with non-displaced left lateral malleolus fracture. Pt was noted to be tachycardic and sob but refused admission and discharged with cast. Pt was Ax0 x3 in ED but Upon my examination pt was lethargic and unable to answer any questions.  (25 Jul 2021 09:56)    # ACUTE ON CHRONIC HYPOXIC HYPERCAPNEIC RESPIRATORY FAILURE, EMPHYSEMA - INTUBATED, ON SOLUMEDROL, ON DUONEB  # MORBIDLY OBESE WITH RESTRICTIVE LUNG DISEASE, SUSPECT OBSTRUCTIVE SLEEP APNOEA , COPD ON O2 NC AT BASELINE  # NONDISPLACED LEFT LATERAL MALLEOLUS FRACTURE - PODIATRY CONSULT  # AMS - SUSPECT S/T HYPERCAPNEA   # HTN  # HLD  # CHRONIC CHF  # CAD S/P STENT  # DM  # TOBACCO ABUSE  #  GI AND DVT PROPHYLAXIS    NAEEM CABELLO MD COVERING ADRIANA CABELLO MD

## 2021-07-25 NOTE — ED PROVIDER NOTE - CARE PLAN
Principal Discharge DX:	COPD (chronic obstructive pulmonary disease)  Secondary Diagnosis:	Hypercapnic respiratory failure

## 2021-07-25 NOTE — H&P ADULT - HISTORY OF PRESENT ILLNESS
60M with PMH including HTN, HLD, DM, CHF, CAD (w stents), COPD on 2.5L home 02 and night BIPAP (12/5 35%) presents from [facility] for shortness of breath. When EMS arrived pt was noted to be in respiratory distress and his portable O2 machine was uncharged and not running. Pt endorsed to ED physician his SOB has worsened overnight after being discharged 1 day PTA. Off note Pt was here last night for left ankle pain, He was diagnosed with non-displaced left lateral malleolus fracture. Pt was noted to be tachycardic and sob but refused admission and discharged with cast. Pt was Ax0 x3 in ED but Upon my examination pt was lethargic and unable to answer any questions.  60M with PMH including HTN, HLD, DM, CHF, CAD (w stents), COPD on 2.5L home 02 and night BIPAP (12/5 35%) presents from [facility] for shortness of breath. When EMS arrived pt was noted to be in respiratory distress and his portable O2 machine was uncharged and not running. Pt endorsed to ED physician his SOB has worsened overnight after being discharged 1 day PTA. Off note Pt was here last night for left ankle pain, He was diagnosed with non-displaced left lateral malleolus fracture. Pt was noted to be tachycardic and sob but refused admission and discharged with cast. Pt was Ax0 x3 in ED but Upon my examination pt was lethargic and unable to answer any questions.  59 yo M with PMH including HTN, HLD, DM, CHF, CAD (w stents), COPD on 2.5L home 02 and night BIPAP (12/5 35%) presents from [facility] for shortness of breath. When EMS arrived pt was noted to be in respiratory distress and his portable O2 machine was uncharged and not running. Pt endorsed to ED physician his SOB has worsened overnight after being discharged 1 day PTA. Off note Pt was here last night for left ankle pain, He was diagnosed with non-displaced left lateral malleolus fracture. Pt was noted to be tachycardic and sob but refused admission and discharged with cast. Pt was Ax0 x3 in ED but Upon my examination pt was lethargic and unable to answer any questions.

## 2021-07-25 NOTE — ED PROVIDER NOTE - PHYSICAL EXAMINATION
Gen: AAOx3, non-toxic  Head: NCAT  HEENT: EOMI, oral mucosa moist, normal conjunctiva  Lung: tachypneic, no wheezes/rhonchi/rales B/L, speaking in full sentences  CV: RRR, no murmurs, rubs or gallops  Abd: soft, NTND, no guarding, no CVA tenderness  MSK: no visible deformities  Neuro: No focal sensory or motor deficits  Skin: +1 b/l pitting edema of the lower extremities   Psych: normal affect.     Scot Yancey, DO

## 2021-07-25 NOTE — H&P ADULT - ASSESSMENT
61 yo M with PMH including HTN, HLD, DM, CHF, CAD (w stents), COPD on 2.5L home 02 and night BIPAP (12/5 35%) presents from [facility] for shortness of breath. ICU was consulted and admitted pt for Acute on chronic hypercapnic respiratory failure requiring intubation       Neuro:  Patient was found to altered mental status.  OE, patient is awake, drowsy and lethargic. Aox0  ABG showed co2 retention of 107, ph 7.26   sedated and intubated in ICU with propofol, fentanyl       Cardiovascular:  Patient has h/o CHF, on lasix at home  Does not look fluid overloaded, not in CHF exacerbation    Hypotensive after sedation   will place central access and start on pressor support    Pulmonary:   Acute on chronic Hypoxic, Hypercapnic Respiratory failure   hx of multiple exacerbations, >2 in past year, class D COPD   Intubated on vent 16/500/50/5  CXR negative for infiltrates  will hold off on abx   CXR and ABG qd     Infectious Diseases:  no active issues     Gastrointestinal:  will place NGT and start tube feeds     Renal:  no active issues     Endo:   Has h/o DM  Fq6 while NPO  c/w sliding scale    Heme/onc:   mild leukocytosis; likely reactive   will monitor for now     thrombocytopenia   plt < 140   will monitor for now    Skin/ catheter: 2 peripheral IV    Prophylaxis: lovenox qd     Goals of Care: Full code     Dispo: ICU          59 yo M with PMH including HTN, HLD, DM, CHF, CAD (w stents), COPD on 2.5L home 02 and night BIPAP (12/5 35%) presents from [facility] for shortness of breath. ICU was consulted and admitted pt for Acute on chronic hypercapnic respiratory failure requiring intubation       Neuro:  Patient was found to altered mental status.  OE, patient is awake, drowsy and lethargic. Aox0  ABG showed co2 retention of 107, ph 7.26   sedated and intubated in ICU with propofol, fentanyl       Cardiovascular:  Patient has h/o CHF, on lasix at home  Does not look fluid overloaded, not in CHF exacerbation  will hold Lasix for now     Hypotensive after sedation   will place central access and start on pressor support    Pulmonary:   Acute on chronic Hypoxic, Hypercapnic Respiratory failure   hx of multiple exacerbations, >2 in past year, class D COPD   Intubated 7/25 on vent 16/500/50/5  CXR negative for infiltrates  will start on IV solumedrol 40 q8h, de-escalate as needed   will hold off on abx, will send procal  CXR and ABG qd     Infectious Diseases:  no active issues     Gastrointestinal:  will place NGT and start tube feeds     Renal:  no active issues     Endo:   Has h/o DM  Fq6 while NPO  c/w sliding scale    Heme/onc:   mild leukocytosis; likely reactive   will monitor for now     thrombocytopenia   plt < 140   will monitor for now    Skin/ catheter: 2 peripheral IV    Prophylaxis: lovenox qd     Goals of Care: Full code     Dispo: ICU

## 2021-07-25 NOTE — ED PROVIDER NOTE - CLINICAL SUMMARY MEDICAL DECISION MAKING FREE TEXT BOX
60M with PMH including HTN, HLD, DM, COPD on 2.5L home 02 and night BIPAP (12/5 35%) presents from [facility] for shortness of breath. Pt mildly tachypneic but lungs clear satting 94% on home 02 of 2L. Likely COPD exacerbation but will also assess for CHF given moderate bilateral lower extremity edema. Labs, XR, admit.

## 2021-07-25 NOTE — ED PROVIDER NOTE - PROGRESS NOTE DETAILS
VBG results noted. Pt adamantly refusing BIPAP. Explained to pt that without BIPAP the pt is in imminent risk of clinically deteriorating and dying. Pt understands and is A&Ox3 at this time. Will reassess shortly. ICU being consulted. Spoke to ICU attending Dr. Marquez who knows the patient well. Requests admission to his service, coming to eval the pt now. Recommends holding off on intubation for now, will intubate in ICU if necessary.

## 2021-07-25 NOTE — AIRWAY PLACEMENT NOTE ADULT - POST AIRWAY PLACEMENT ASSESSMENT:
breath sounds bilateral/breath sounds equal/positive end tidal CO2 noted/chest excursion noted/skin color improved breath sounds bilateral/breath sounds equal/positive end tidal CO2 noted/CXR pending/chest excursion noted/skin color improved

## 2021-07-26 LAB
ALBUMIN SERPL ELPH-MCNC: 2.7 G/DL — LOW (ref 3.5–5)
ALP SERPL-CCNC: 66 U/L — SIGNIFICANT CHANGE UP (ref 40–120)
ALT FLD-CCNC: 27 U/L DA — SIGNIFICANT CHANGE UP (ref 10–60)
ANION GAP SERPL CALC-SCNC: 9 MMOL/L — SIGNIFICANT CHANGE UP (ref 5–17)
AST SERPL-CCNC: 15 U/L — SIGNIFICANT CHANGE UP (ref 10–40)
BASE EXCESS BLDA CALC-SCNC: 6.9 MMOL/L — HIGH (ref -2–3)
BASOPHILS # BLD AUTO: 0.02 K/UL — SIGNIFICANT CHANGE UP (ref 0–0.2)
BASOPHILS NFR BLD AUTO: 0.2 % — SIGNIFICANT CHANGE UP (ref 0–2)
BILIRUB SERPL-MCNC: 0.5 MG/DL — SIGNIFICANT CHANGE UP (ref 0.2–1.2)
BLOOD GAS COMMENTS ARTERIAL: SIGNIFICANT CHANGE UP
BUN SERPL-MCNC: 22 MG/DL — HIGH (ref 7–18)
CALCIUM SERPL-MCNC: 8.2 MG/DL — LOW (ref 8.4–10.5)
CHLORIDE SERPL-SCNC: 95 MMOL/L — LOW (ref 96–108)
CK MB BLD-MCNC: 9.2 % — HIGH (ref 0–3.5)
CK MB CFR SERPL CALC: 1.1 NG/ML — SIGNIFICANT CHANGE UP (ref 0–3.6)
CK SERPL-CCNC: 12 U/L — LOW (ref 35–232)
CO2 SERPL-SCNC: 30 MMOL/L — SIGNIFICANT CHANGE UP (ref 22–31)
COVID-19 SPIKE DOMAIN AB INTERP: POSITIVE
COVID-19 SPIKE DOMAIN ANTIBODY RESULT: >250 U/ML — HIGH
CREAT SERPL-MCNC: 0.55 MG/DL — SIGNIFICANT CHANGE UP (ref 0.5–1.3)
EOSINOPHIL # BLD AUTO: 0 K/UL — SIGNIFICANT CHANGE UP (ref 0–0.5)
EOSINOPHIL NFR BLD AUTO: 0 % — SIGNIFICANT CHANGE UP (ref 0–6)
GLUCOSE BLDC GLUCOMTR-MCNC: 244 MG/DL — HIGH (ref 70–99)
GLUCOSE BLDC GLUCOMTR-MCNC: 245 MG/DL — HIGH (ref 70–99)
GLUCOSE BLDC GLUCOMTR-MCNC: 251 MG/DL — HIGH (ref 70–99)
GLUCOSE SERPL-MCNC: 248 MG/DL — HIGH (ref 70–99)
HCO3 BLDA-SCNC: 34 MMOL/L — HIGH (ref 21–28)
HCT VFR BLD CALC: 39.6 % — SIGNIFICANT CHANGE UP (ref 39–50)
HGB BLD-MCNC: 12.2 G/DL — LOW (ref 13–17)
HOROWITZ INDEX BLDA+IHG-RTO: 50 — SIGNIFICANT CHANGE UP
IMM GRANULOCYTES NFR BLD AUTO: 0.7 % — SIGNIFICANT CHANGE UP (ref 0–1.5)
LYMPHOCYTES # BLD AUTO: 0.64 K/UL — LOW (ref 1–3.3)
LYMPHOCYTES # BLD AUTO: 5.2 % — LOW (ref 13–44)
MAGNESIUM SERPL-MCNC: 2.1 MG/DL — SIGNIFICANT CHANGE UP (ref 1.6–2.6)
MCHC RBC-ENTMCNC: 28.2 PG — SIGNIFICANT CHANGE UP (ref 27–34)
MCHC RBC-ENTMCNC: 30.8 GM/DL — LOW (ref 32–36)
MCV RBC AUTO: 91.5 FL — SIGNIFICANT CHANGE UP (ref 80–100)
MONOCYTES # BLD AUTO: 0.13 K/UL — SIGNIFICANT CHANGE UP (ref 0–0.9)
MONOCYTES NFR BLD AUTO: 1.1 % — LOW (ref 2–14)
NEUTROPHILS # BLD AUTO: 11.41 K/UL — HIGH (ref 1.8–7.4)
NEUTROPHILS NFR BLD AUTO: 92.8 % — HIGH (ref 43–77)
NRBC # BLD: 0 /100 WBCS — SIGNIFICANT CHANGE UP (ref 0–0)
PCO2 BLDA: 59 MMHG — HIGH (ref 35–48)
PH BLDA: 7.37 — SIGNIFICANT CHANGE UP (ref 7.35–7.45)
PHOSPHATE SERPL-MCNC: 4.1 MG/DL — SIGNIFICANT CHANGE UP (ref 2.5–4.5)
PLATELET # BLD AUTO: 170 K/UL — SIGNIFICANT CHANGE UP (ref 150–400)
PO2 BLDA: 79 MMHG — LOW (ref 83–108)
POTASSIUM SERPL-MCNC: 4.8 MMOL/L — SIGNIFICANT CHANGE UP (ref 3.5–5.3)
POTASSIUM SERPL-SCNC: 4.8 MMOL/L — SIGNIFICANT CHANGE UP (ref 3.5–5.3)
PROCALCITONIN SERPL-MCNC: 0.07 NG/ML — SIGNIFICANT CHANGE UP (ref 0.02–0.1)
PROT SERPL-MCNC: 5.9 G/DL — LOW (ref 6–8.3)
RBC # BLD: 4.33 M/UL — SIGNIFICANT CHANGE UP (ref 4.2–5.8)
RBC # FLD: 13.3 % — SIGNIFICANT CHANGE UP (ref 10.3–14.5)
SAO2 % BLDA: 96 % — SIGNIFICANT CHANGE UP
SARS-COV-2 IGG+IGM SERPL QL IA: >250 U/ML — HIGH
SARS-COV-2 IGG+IGM SERPL QL IA: POSITIVE
SODIUM SERPL-SCNC: 134 MMOL/L — LOW (ref 135–145)
TROPONIN I SERPL-MCNC: <0.015 NG/ML — SIGNIFICANT CHANGE UP (ref 0–0.04)
WBC # BLD: 12.28 K/UL — HIGH (ref 3.8–10.5)
WBC # FLD AUTO: 12.28 K/UL — HIGH (ref 3.8–10.5)

## 2021-07-26 PROCEDURE — 71045 X-RAY EXAM CHEST 1 VIEW: CPT | Mod: 26

## 2021-07-26 RX ADMIN — Medication 3: at 11:38

## 2021-07-26 RX ADMIN — PANTOPRAZOLE SODIUM 40 MILLIGRAM(S): 20 TABLET, DELAYED RELEASE ORAL at 11:38

## 2021-07-26 RX ADMIN — Medication 81 MILLIGRAM(S): at 11:38

## 2021-07-26 RX ADMIN — PROPOFOL 5.44 MICROGRAM(S)/KG/MIN: 10 INJECTION, EMULSION INTRAVENOUS at 06:42

## 2021-07-26 RX ADMIN — ENOXAPARIN SODIUM 40 MILLIGRAM(S): 100 INJECTION SUBCUTANEOUS at 11:38

## 2021-07-26 RX ADMIN — CHLORHEXIDINE GLUCONATE 15 MILLILITER(S): 213 SOLUTION TOPICAL at 17:11

## 2021-07-26 RX ADMIN — PROPOFOL 5.44 MICROGRAM(S)/KG/MIN: 10 INJECTION, EMULSION INTRAVENOUS at 17:13

## 2021-07-26 RX ADMIN — Medication 40 MILLIGRAM(S): at 05:41

## 2021-07-26 RX ADMIN — Medication 1 DROP(S): at 05:39

## 2021-07-26 RX ADMIN — CHLORHEXIDINE GLUCONATE 1 APPLICATION(S): 213 SOLUTION TOPICAL at 05:39

## 2021-07-26 RX ADMIN — Medication 40 MILLIGRAM(S): at 22:34

## 2021-07-26 RX ADMIN — CHLORHEXIDINE GLUCONATE 15 MILLILITER(S): 213 SOLUTION TOPICAL at 05:39

## 2021-07-26 RX ADMIN — Medication 150 MILLIGRAM(S): at 22:34

## 2021-07-26 RX ADMIN — Medication 2: at 17:11

## 2021-07-26 RX ADMIN — FENTANYL CITRATE 4.54 MICROGRAM(S)/KG/HR: 50 INJECTION INTRAVENOUS at 17:12

## 2021-07-26 RX ADMIN — Medication 40 MILLIGRAM(S): at 13:19

## 2021-07-26 RX ADMIN — PROPOFOL 5.44 MICROGRAM(S)/KG/MIN: 10 INJECTION, EMULSION INTRAVENOUS at 09:30

## 2021-07-26 RX ADMIN — Medication 1 DROP(S): at 22:34

## 2021-07-26 RX ADMIN — Medication 2: at 06:04

## 2021-07-26 RX ADMIN — FENTANYL CITRATE 4.54 MICROGRAM(S)/KG/HR: 50 INJECTION INTRAVENOUS at 06:42

## 2021-07-26 RX ADMIN — Medication 2: at 22:58

## 2021-07-26 RX ADMIN — Medication 4.38 MICROGRAM(S)/KG/MIN: at 10:00

## 2021-07-26 RX ADMIN — RISPERIDONE 1 MILLIGRAM(S): 4 TABLET ORAL at 11:38

## 2021-07-26 RX ADMIN — Medication 1 DROP(S): at 13:19

## 2021-07-26 NOTE — PROGRESS NOTE ADULT - SUBJECTIVE AND OBJECTIVE BOX
INTERVAL HPI/OVERNIGHT EVENTS: ***    PRESSORS: [ ] YES [ ] NO  WHICH:      Antimicrobial:    Cardiovascular:  norepinephrine Infusion 0.05 MICROgram(s)/kG/Min IV Continuous <Continuous>  phenylephrine    Infusion 2 MICROgram(s)/kG/Min IV Continuous <Continuous>    Pulmonary:  ALBUTerol    90 MICROgram(s) HFA Inhaler 2 Puff(s) Inhalation every 6 hours PRN  tiotropium 18 MICROgram(s) Capsule 1 Capsule(s) Inhalation daily    Hematalogic:  aspirin  chewable 81 milliGRAM(s) Oral daily  enoxaparin Injectable 40 milliGRAM(s) SubCutaneous daily    Other:  artificial  tears Solution 1 Drop(s) Both EYES three times a day  chlorhexidine 0.12% Liquid 15 milliLiter(s) Oral Mucosa every 12 hours  chlorhexidine 4% Liquid 1 Application(s) Topical <User Schedule>  dextrose 50% Injectable 12.5 Gram(s) IV Push once  fentaNYL   Infusion. 0.5 MICROgram(s)/kG/Hr IV Continuous <Continuous>  insulin lispro (ADMELOG) corrective regimen sliding scale   SubCutaneous every 6 hours  methylPREDNISolone sodium succinate Injectable 40 milliGRAM(s) IV Push three times a day  pantoprazole  Injectable 40 milliGRAM(s) IV Push daily  propofol Infusion 10 MICROgram(s)/kG/Min IV Continuous <Continuous>  risperiDONE   Tablet 1 milliGRAM(s) Oral daily  sodium chloride 0.9% lock flush 10 milliLiter(s) IV Push every 1 hour PRN  traZODone 150 milliGRAM(s) Oral at bedtime    ALBUTerol    90 MICROgram(s) HFA Inhaler 2 Puff(s) Inhalation every 6 hours PRN  artificial  tears Solution 1 Drop(s) Both EYES three times a day  aspirin  chewable 81 milliGRAM(s) Oral daily  chlorhexidine 0.12% Liquid 15 milliLiter(s) Oral Mucosa every 12 hours  chlorhexidine 4% Liquid 1 Application(s) Topical <User Schedule>  dextrose 50% Injectable 12.5 Gram(s) IV Push once  enoxaparin Injectable 40 milliGRAM(s) SubCutaneous daily  fentaNYL   Infusion. 0.5 MICROgram(s)/kG/Hr IV Continuous <Continuous>  insulin lispro (ADMELOG) corrective regimen sliding scale   SubCutaneous every 6 hours  methylPREDNISolone sodium succinate Injectable 40 milliGRAM(s) IV Push three times a day  norepinephrine Infusion 0.05 MICROgram(s)/kG/Min IV Continuous <Continuous>  pantoprazole  Injectable 40 milliGRAM(s) IV Push daily  phenylephrine    Infusion 2 MICROgram(s)/kG/Min IV Continuous <Continuous>  propofol Infusion 10 MICROgram(s)/kG/Min IV Continuous <Continuous>  risperiDONE   Tablet 1 milliGRAM(s) Oral daily  sodium chloride 0.9% lock flush 10 milliLiter(s) IV Push every 1 hour PRN  tiotropium 18 MICROgram(s) Capsule 1 Capsule(s) Inhalation daily  traZODone 150 milliGRAM(s) Oral at bedtime    Drug Dosing Weight  Height (cm): 172.7 (25 Jul 2021 07:05)  Weight (kg): 93.5 (25 Jul 2021 10:35)  BMI (kg/m2): 31.3 (25 Jul 2021 10:35)  BSA (m2): 2.07 (25 Jul 2021 10:35)    CENTRAL LINE: [ ] YES [ ] NO  LOCATION:         AMOR: [ ] YES [ ] NO          A-LINE:  [ ] YES [ ] NO  LOCATION:             ICU Vital Signs Last 24 Hrs  T(C): 36.8 (26 Jul 2021 08:00), Max: 37.4 (25 Jul 2021 16:15)  T(F): 98.2 (26 Jul 2021 08:00), Max: 99.4 (25 Jul 2021 16:15)  HR: 86 (26 Jul 2021 08:45) (57 - 108)  BP: 122/59 (26 Jul 2021 08:45) (53/37 - 138/53)  BP(mean): 74 (26 Jul 2021 08:45) (41 - 105)  ABP: --  ABP(mean): --  RR: 16 (26 Jul 2021 08:45) (14 - 30)  SpO2: 93% (26 Jul 2021 08:45) (82% - 100%)      ABG - ( 26 Jul 2021 03:25 )  pH, Arterial: 7.37  pH, Blood: x     /  pCO2: 59    /  pO2: 79    / HCO3: 34    / Base Excess: 6.9   /  SaO2: 96                    07-25 @ 07:01  -  07-26 @ 07:00  --------------------------------------------------------  IN: 1216.7 mL / OUT: 2700 mL / NET: -1483.3 mL        Mode: AC/ CMV (Assist Control/ Continuous Mandatory Ventilation)  RR (machine): 16  TV (machine): 500  FiO2: 50  PEEP: 5  ITime: 1  MAP: 9  PIP: 22      REVIEW OF SYSTEMS:    CONSTITUTIONAL: No weakness, fevers or chills  NECK: No pain or stiffness  RESPIRATORY: No cough, wheezing, hemoptysis; No shortness of breath  CARDIOVASCULAR: No chest pain or palpitations  GASTROINTESTINAL: No abdominal or epigastric pain. No nausea, vomiting, No diarrhea or constipation. No melena or hematochezia.  GENITOURINARY: No dysuria, frequency or hematuria  NEUROLOGICAL: No numbness or weakness  All other review of systems is negative unless indicated above      PHYSICAL EXAM:    GENERAL: NAD, well-groomed, well-developed  EYES: EOMI, PERRLA,   NECK: Supple, No JVD; Normal thyroid; Trachea midline  NERVOUS SYSTEM:  Alert & Oriented X3,  Motor Strength 5/5 B/L upper and lower extremities; DTRs 2+ intact and symmetric  CHEST/LUNG: No rales, rhonchi, wheezing   HEART: Regular rate and rhythm; No murmurs,   ABDOMEN: Soft, Nontender, Nondistended; Bowel sounds present  EXTREMITIES:  2+ Peripheral Pulses, No clubbing, cyanosis, or edema        LABS:  CBC Full  -  ( 26 Jul 2021 05:27 )  WBC Count : 12.28 K/uL  RBC Count : 4.33 M/uL  Hemoglobin : 12.2 g/dL  Hematocrit : 39.6 %  Platelet Count - Automated : 170 K/uL  Mean Cell Volume : 91.5 fl  Mean Cell Hemoglobin : 28.2 pg  Mean Cell Hemoglobin Concentration : 30.8 gm/dL  Auto Neutrophil # : 11.41 K/uL  Auto Lymphocyte # : 0.64 K/uL  Auto Monocyte # : 0.13 K/uL  Auto Eosinophil # : 0.00 K/uL  Auto Basophil # : 0.02 K/uL  Auto Neutrophil % : 92.8 %  Auto Lymphocyte % : 5.2 %  Auto Monocyte % : 1.1 %  Auto Eosinophil % : 0.0 %  Auto Basophil % : 0.2 %    07-26    134<L>  |  95<L>  |  22<H>  ----------------------------<  248<H>  4.8   |  30  |  0.55    Ca    8.2<L>      26 Jul 2021 05:27  Phos  4.1     07-26  Mg     2.1     07-26    TPro  5.9<L>  /  Alb  2.7<L>  /  TBili  0.5  /  DBili  x   /  AST  15  /  ALT  27  /  AlkPhos  66  07-26            RADIOLOGY & ADDITIONAL STUDIES REVIEWED:  ***    [ ]GOALS OF CARE DISCUSSION WITH PATIENT/FAMILY/PROXY:    CRITICAL CARE TIME SPENT: 35 minutes INTERVAL HPI/OVERNIGHT EVENTS: ***    PRESSORS: [ ] YES [ ] NO  WHICH:      Antimicrobial:    Cardiovascular:  norepinephrine Infusion 0.05 MICROgram(s)/kG/Min IV Continuous <Continuous>  phenylephrine    Infusion 2 MICROgram(s)/kG/Min IV Continuous <Continuous>    Pulmonary:  ALBUTerol    90 MICROgram(s) HFA Inhaler 2 Puff(s) Inhalation every 6 hours PRN  tiotropium 18 MICROgram(s) Capsule 1 Capsule(s) Inhalation daily    Hematalogic:  aspirin  chewable 81 milliGRAM(s) Oral daily  enoxaparin Injectable 40 milliGRAM(s) SubCutaneous daily    Other:  artificial  tears Solution 1 Drop(s) Both EYES three times a day  chlorhexidine 0.12% Liquid 15 milliLiter(s) Oral Mucosa every 12 hours  chlorhexidine 4% Liquid 1 Application(s) Topical <User Schedule>  dextrose 50% Injectable 12.5 Gram(s) IV Push once  fentaNYL   Infusion. 0.5 MICROgram(s)/kG/Hr IV Continuous <Continuous>  insulin lispro (ADMELOG) corrective regimen sliding scale   SubCutaneous every 6 hours  methylPREDNISolone sodium succinate Injectable 40 milliGRAM(s) IV Push three times a day  pantoprazole  Injectable 40 milliGRAM(s) IV Push daily  propofol Infusion 10 MICROgram(s)/kG/Min IV Continuous <Continuous>  risperiDONE   Tablet 1 milliGRAM(s) Oral daily  sodium chloride 0.9% lock flush 10 milliLiter(s) IV Push every 1 hour PRN  traZODone 150 milliGRAM(s) Oral at bedtime    ALBUTerol    90 MICROgram(s) HFA Inhaler 2 Puff(s) Inhalation every 6 hours PRN  artificial  tears Solution 1 Drop(s) Both EYES three times a day  aspirin  chewable 81 milliGRAM(s) Oral daily  chlorhexidine 0.12% Liquid 15 milliLiter(s) Oral Mucosa every 12 hours  chlorhexidine 4% Liquid 1 Application(s) Topical <User Schedule>  dextrose 50% Injectable 12.5 Gram(s) IV Push once  enoxaparin Injectable 40 milliGRAM(s) SubCutaneous daily  fentaNYL   Infusion. 0.5 MICROgram(s)/kG/Hr IV Continuous <Continuous>  insulin lispro (ADMELOG) corrective regimen sliding scale   SubCutaneous every 6 hours  methylPREDNISolone sodium succinate Injectable 40 milliGRAM(s) IV Push three times a day  norepinephrine Infusion 0.05 MICROgram(s)/kG/Min IV Continuous <Continuous>  pantoprazole  Injectable 40 milliGRAM(s) IV Push daily  phenylephrine    Infusion 2 MICROgram(s)/kG/Min IV Continuous <Continuous>  propofol Infusion 10 MICROgram(s)/kG/Min IV Continuous <Continuous>  risperiDONE   Tablet 1 milliGRAM(s) Oral daily  sodium chloride 0.9% lock flush 10 milliLiter(s) IV Push every 1 hour PRN  tiotropium 18 MICROgram(s) Capsule 1 Capsule(s) Inhalation daily  traZODone 150 milliGRAM(s) Oral at bedtime    Drug Dosing Weight  Height (cm): 172.7 (25 Jul 2021 07:05)  Weight (kg): 93.5 (25 Jul 2021 10:35)  BMI (kg/m2): 31.3 (25 Jul 2021 10:35)  BSA (m2): 2.07 (25 Jul 2021 10:35)    CENTRAL LINE: [ ] YES [ ] NO  LOCATION:         AMOR: [ ] YES [ ] NO          A-LINE:  [ ] YES [ ] NO  LOCATION:             ICU Vital Signs Last 24 Hrs  T(C): 36.8 (26 Jul 2021 08:00), Max: 37.4 (25 Jul 2021 16:15)  T(F): 98.2 (26 Jul 2021 08:00), Max: 99.4 (25 Jul 2021 16:15)  HR: 86 (26 Jul 2021 08:45) (57 - 108)  BP: 122/59 (26 Jul 2021 08:45) (53/37 - 138/53)  BP(mean): 74 (26 Jul 2021 08:45) (41 - 105)  ABP: --  ABP(mean): --  RR: 16 (26 Jul 2021 08:45) (14 - 30)  SpO2: 93% (26 Jul 2021 08:45) (82% - 100%)      ABG - ( 26 Jul 2021 03:25 )  pH, Arterial: 7.37  pH, Blood: x     /  pCO2: 59    /  pO2: 79    / HCO3: 34    / Base Excess: 6.9   /  SaO2: 96                    07-25 @ 07:01  -  07-26 @ 07:00  --------------------------------------------------------  IN: 1216.7 mL / OUT: 2700 mL / NET: -1483.3 mL        Mode: AC/ CMV (Assist Control/ Continuous Mandatory Ventilation)  RR (machine): 16  TV (machine): 500  FiO2: 50  PEEP: 5  ITime: 1  MAP: 9  PIP: 22        PHYSICAL EXAM:    GENERAL: NAD, well-groomed, well-developed +ETT  EYES: EOMI, PERRLA,   NECK: Supple, No JVD; Normal thyroid; Trachea midline  NERVOUS SYSTEM:  sedated   CHEST/LUNG: No rales, rhonchi, wheezing   HEART: Regular rate and rhythm; No murmurs,   ABDOMEN: Soft, Nontender, Nondistended; Bowel sounds present  EXTREMITIES:  2+ Peripheral Pulses, No clubbing, cyanosis, or edema        LABS:  CBC Full  -  ( 26 Jul 2021 05:27 )  WBC Count : 12.28 K/uL  RBC Count : 4.33 M/uL  Hemoglobin : 12.2 g/dL  Hematocrit : 39.6 %  Platelet Count - Automated : 170 K/uL  Mean Cell Volume : 91.5 fl  Mean Cell Hemoglobin : 28.2 pg  Mean Cell Hemoglobin Concentration : 30.8 gm/dL  Auto Neutrophil # : 11.41 K/uL  Auto Lymphocyte # : 0.64 K/uL  Auto Monocyte # : 0.13 K/uL  Auto Eosinophil # : 0.00 K/uL  Auto Basophil # : 0.02 K/uL  Auto Neutrophil % : 92.8 %  Auto Lymphocyte % : 5.2 %  Auto Monocyte % : 1.1 %  Auto Eosinophil % : 0.0 %  Auto Basophil % : 0.2 %    07-26    134<L>  |  95<L>  |  22<H>  ----------------------------<  248<H>  4.8   |  30  |  0.55    Ca    8.2<L>      26 Jul 2021 05:27  Phos  4.1     07-26  Mg     2.1     07-26    TPro  5.9<L>  /  Alb  2.7<L>  /  TBili  0.5  /  DBili  x   /  AST  15  /  ALT  27  /  AlkPhos  66  07-26            RADIOLOGY & ADDITIONAL STUDIES REVIEWED:  ***  CXR:< from: Xray Chest 1 View- PORTABLE-Urgent (Xray Chest 1 View- PORTABLE-Urgent .) (07.25.21 @ 08:57) >    EXAM:  XR CHEST PORTABLE URGENT 1V                            PROCEDURE DATE:  07/25/2021          INTERPRETATION:  History: Shortness of breath. Difficulty breathing.    FINDINGS: Frontal chest.    COMPARISON: 7/24/2021.    Exam is limited due to collimation at the lung bases.    Heart size may be exaggerated by AP technique. Mild bronchovascular crowding at the bases. There is no focal lung consolidation or sizable pleural effusion. No significant osseous abnormality. Degenerative change of the spine.    IMPRESSION:    Unremarkable frontal chest.    --- End of Report ---            KRISHNA COFFMAN MD; Attending Radiologist  This document has been electronically signed. Jul 25 2021  9:00AM    < end of copied text >    [ ]GOALS OF CARE DISCUSSION WITH PATIENT/FAMILY/PROXY:    CRITICAL CARE TIME SPENT: 35 minutes INTERVAL HPI/OVERNIGHT EVENTS: Became hemodynamically unstable w/ SBP in the 70s. Phentanyl d/c'd, levo increased    PRESSORS: [x ] YES [ ] NO  WHICH:      Antimicrobial:    Cardiovascular:  norepinephrine Infusion 0.05 MICROgram(s)/kG/Min IV Continuous <Continuous>  phenylephrine    Infusion 2 MICROgram(s)/kG/Min IV Continuous <Continuous>    Pulmonary:  ALBUTerol    90 MICROgram(s) HFA Inhaler 2 Puff(s) Inhalation every 6 hours PRN  tiotropium 18 MICROgram(s) Capsule 1 Capsule(s) Inhalation daily    Hematalogic:  aspirin  chewable 81 milliGRAM(s) Oral daily  enoxaparin Injectable 40 milliGRAM(s) SubCutaneous daily    Other:  artificial  tears Solution 1 Drop(s) Both EYES three times a day  chlorhexidine 0.12% Liquid 15 milliLiter(s) Oral Mucosa every 12 hours  chlorhexidine 4% Liquid 1 Application(s) Topical <User Schedule>  dextrose 50% Injectable 12.5 Gram(s) IV Push once  fentaNYL   Infusion. 0.5 MICROgram(s)/kG/Hr IV Continuous <Continuous>  insulin lispro (ADMELOG) corrective regimen sliding scale   SubCutaneous every 6 hours  methylPREDNISolone sodium succinate Injectable 40 milliGRAM(s) IV Push three times a day  pantoprazole  Injectable 40 milliGRAM(s) IV Push daily  propofol Infusion 10 MICROgram(s)/kG/Min IV Continuous <Continuous>  risperiDONE   Tablet 1 milliGRAM(s) Oral daily  sodium chloride 0.9% lock flush 10 milliLiter(s) IV Push every 1 hour PRN  traZODone 150 milliGRAM(s) Oral at bedtime    ALBUTerol    90 MICROgram(s) HFA Inhaler 2 Puff(s) Inhalation every 6 hours PRN  artificial  tears Solution 1 Drop(s) Both EYES three times a day  aspirin  chewable 81 milliGRAM(s) Oral daily  chlorhexidine 0.12% Liquid 15 milliLiter(s) Oral Mucosa every 12 hours  chlorhexidine 4% Liquid 1 Application(s) Topical <User Schedule>  dextrose 50% Injectable 12.5 Gram(s) IV Push once  enoxaparin Injectable 40 milliGRAM(s) SubCutaneous daily  fentaNYL   Infusion. 0.5 MICROgram(s)/kG/Hr IV Continuous <Continuous>  insulin lispro (ADMELOG) corrective regimen sliding scale   SubCutaneous every 6 hours  methylPREDNISolone sodium succinate Injectable 40 milliGRAM(s) IV Push three times a day  norepinephrine Infusion 0.05 MICROgram(s)/kG/Min IV Continuous <Continuous>  pantoprazole  Injectable 40 milliGRAM(s) IV Push daily  phenylephrine    Infusion 2 MICROgram(s)/kG/Min IV Continuous <Continuous>  propofol Infusion 10 MICROgram(s)/kG/Min IV Continuous <Continuous>  risperiDONE   Tablet 1 milliGRAM(s) Oral daily  sodium chloride 0.9% lock flush 10 milliLiter(s) IV Push every 1 hour PRN  tiotropium 18 MICROgram(s) Capsule 1 Capsule(s) Inhalation daily  traZODone 150 milliGRAM(s) Oral at bedtime    Drug Dosing Weight  Height (cm): 172.7 (25 Jul 2021 07:05)  Weight (kg): 93.5 (25 Jul 2021 10:35)  BMI (kg/m2): 31.3 (25 Jul 2021 10:35)  BSA (m2): 2.07 (25 Jul 2021 10:35)    CENTRAL LINE: [x ] YES [ ] NO  LOCATION:   [facility] 7/25      AMOR: [x ] YES [ ] NO    7/25      A-LINE:  [ ] YES [x ] NO  LOCATION:             ICU Vital Signs Last 24 Hrs  T(C): 36.8 (26 Jul 2021 08:00), Max: 37.4 (25 Jul 2021 16:15)  T(F): 98.2 (26 Jul 2021 08:00), Max: 99.4 (25 Jul 2021 16:15)  HR: 86 (26 Jul 2021 08:45) (57 - 108)  BP: 122/59 (26 Jul 2021 08:45) (53/37 - 138/53)  BP(mean): 74 (26 Jul 2021 08:45) (41 - 105)  ABP: --  ABP(mean): --  RR: 16 (26 Jul 2021 08:45) (14 - 30)  SpO2: 93% (26 Jul 2021 08:45) (82% - 100%)      ABG - ( 26 Jul 2021 03:25 )  pH, Arterial: 7.37  pH, Blood: x     /  pCO2: 59    /  pO2: 79    / HCO3: 34    / Base Excess: 6.9   /  SaO2: 96                    07-25 @ 07:01  -  07-26 @ 07:00  --------------------------------------------------------  IN: 1216.7 mL / OUT: 2700 mL / NET: -1483.3 mL        Mode: AC/ CMV (Assist Control/ Continuous Mandatory Ventilation)  RR (machine): 16  TV (machine): 500  FiO2: 50  PEEP: 5  ITime: 1  MAP: 9  PIP: 22        PHYSICAL EXAM:    GENERAL: NAD, well-groomed, well-developed +ETT, increased AP diameter  EYES: PERRLA  NECK: Supple, No JVD; Normal thyroid; Trachea midline  NERVOUS SYSTEM:  sedated   CHEST/LUNG: No rales, rhonchi, wheezing. Breath sounds distant   HEART: Regular rate and rhythm; No murmurs,   ABDOMEN: Soft, Nontender, Nondistended; Bowel sounds present  EXTREMITIES:   No clubbing, cyanosis, or edema        LABS:  CBC Full  -  ( 26 Jul 2021 05:27 )  WBC Count : 12.28 K/uL  RBC Count : 4.33 M/uL  Hemoglobin : 12.2 g/dL  Hematocrit : 39.6 %  Platelet Count - Automated : 170 K/uL  Mean Cell Volume : 91.5 fl  Mean Cell Hemoglobin : 28.2 pg  Mean Cell Hemoglobin Concentration : 30.8 gm/dL  Auto Neutrophil # : 11.41 K/uL  Auto Lymphocyte # : 0.64 K/uL  Auto Monocyte # : 0.13 K/uL  Auto Eosinophil # : 0.00 K/uL  Auto Basophil # : 0.02 K/uL  Auto Neutrophil % : 92.8 %  Auto Lymphocyte % : 5.2 %  Auto Monocyte % : 1.1 %  Auto Eosinophil % : 0.0 %  Auto Basophil % : 0.2 %    07-26    134<L>  |  95<L>  |  22<H>  ----------------------------<  248<H>  4.8   |  30  |  0.55    Ca    8.2<L>      26 Jul 2021 05:27  Phos  4.1     07-26  Mg     2.1     07-26    TPro  5.9<L>  /  Alb  2.7<L>  /  TBili  0.5  /  DBili  x   /  AST  15  /  ALT  27  /  AlkPhos  66  07-26            RADIOLOGY & ADDITIONAL STUDIES REVIEWED:  ***  CXR:< from: Xray Chest 1 View- PORTABLE-Urgent (Xray Chest 1 View- PORTABLE-Urgent .) (07.25.21 @ 08:57) >    EXAM:  XR CHEST PORTABLE URGENT 1V                            PROCEDURE DATE:  07/25/2021          INTERPRETATION:  History: Shortness of breath. Difficulty breathing.    FINDINGS: Frontal chest.    COMPARISON: 7/24/2021.    Exam is limited due to collimation at the lung bases.    Heart size may be exaggerated by AP technique. Mild bronchovascular crowding at the bases. There is no focal lung consolidation or sizable pleural effusion. No significant osseous abnormality. Degenerative change of the spine.    IMPRESSION:    Unremarkable frontal chest.    --- End of Report ---            KRISHNA COFFMAN MD; Attending Radiologist  This document has been electronically signed. Jul 25 2021  9:00AM    < end of copied text >    [ ]GOALS OF CARE DISCUSSION WITH PATIENT/FAMILY/PROXY:    CRITICAL CARE TIME SPENT: 35 minutes

## 2021-07-26 NOTE — PROCEDURE NOTE - NSPROCDETAILS_GEN_ALL_CORE
guidewire recovered/lumen(s) aspirated and flushed/sterile dressing applied
location identified, draped/prepped, sterile technique used, needle inserted/introduced/positive blood return obtained via catheter/connected to a pressurized flush line/sutured in place/hemostasis with direct pressure, dressing applied/Seldinger technique/all materials/supplies accounted for at end of procedure

## 2021-07-26 NOTE — PROGRESS NOTE ADULT - SUBJECTIVE AND OBJECTIVE BOX
`Patient is a 60y old  Male who presents with a chief complaint of AHHRF (2021 09:10)    PATIENT IS SEEN AND EXAMINED IN MEDICAL FLOOR.  EMIT [    ]    MT [   ]      GT [   ]    ALLERGIES:  No Known Allergies      Daily     Daily Weight in k.5 (2021 07:30)    VITALS:    Vital Signs Last 24 Hrs  T(C): 37.2 (2021 12:00), Max: 37.4 (2021 16:15)  T(F): 99 (2021 12:00), Max: 99.4 (2021 16:15)  HR: 84 (2021 15:00) (57 - 100)  BP: 104/53 (2021 14:00) (71/38 - 141/57)  BP(mean): 65 (2021 14:00) (45 - 85)  RR: 14 (2021 15:00) (9 - 26)  SpO2: 91% (2021 15:00) (90% - 100%)    LABS:    CBC Full  -  ( 2021 05:27 )  WBC Count : 12.28 K/uL  RBC Count : 4.33 M/uL  Hemoglobin : 12.2 g/dL  Hematocrit : 39.6 %  Platelet Count - Automated : 170 K/uL  Mean Cell Volume : 91.5 fl  Mean Cell Hemoglobin : 28.2 pg  Mean Cell Hemoglobin Concentration : 30.8 gm/dL  Auto Neutrophil # : 11.41 K/uL  Auto Lymphocyte # : 0.64 K/uL  Auto Monocyte # : 0.13 K/uL  Auto Eosinophil # : 0.00 K/uL  Auto Basophil # : 0.02 K/uL  Auto Neutrophil % : 92.8 %  Auto Lymphocyte % : 5.2 %  Auto Monocyte % : 1.1 %  Auto Eosinophil % : 0.0 %  Auto Basophil % : 0.2 %          134<L>  |  95<L>  |  22<H>  ----------------------------<  248<H>  4.8   |  30  |  0.55    Ca    8.2<L>      2021 05:27  Phos  4.1     -  Mg     2.1     -    TPro  5.9<L>  /  Alb  2.7<L>  /  TBili  0.5  /  DBili  x   /  AST  15  /  ALT  27  /  AlkPhos  66      CAPILLARY BLOOD GLUCOSE      POCT Blood Glucose.: 251 mg/dL (2021 11:20)  POCT Blood Glucose.: 172 mg/dL (2021 23:30)  POCT Blood Glucose.: 98 mg/dL (2021 17:28)    CARDIAC MARKERS ( 2021 13:51 )  <0.015 ng/mL / x     / 12 U/L / x     / 1.1 ng/mL  CARDIAC MARKERS ( 2021 15:25 )  0.016 ng/mL / x     / x     / x     / x          LIVER FUNCTIONS - ( 2021 05:27 )  Alb: 2.7 g/dL / Pro: 5.9 g/dL / ALK PHOS: 66 U/L / ALT: 27 U/L DA / AST: 15 U/L / GGT: x           Creatinine Trend: 0.55<--, 0.42<--, 0.56<--, 0.50<--, 0.63<--, 0.53<--  I&O's Summary    2021 07:01  -  2021 07:00  --------------------------------------------------------  IN: 1256.7 mL / OUT: 2700 mL / NET: -1443.3 mL    2021 07:01  -  2021 15:36  --------------------------------------------------------  IN: 496.4 mL / OUT: 500 mL / NET: -3.6 mL        ABG - ( 2021 03:25 )  pH, Arterial: 7.37  pH, Blood: x     /  pCO2: 59    /  pO2: 79    / HCO3: 34    / Base Excess: 6.9   /  SaO2: 96                      MEDICATIONS:    MEDICATIONS  (STANDING):  artificial  tears Solution 1 Drop(s) Both EYES three times a day  aspirin  chewable 81 milliGRAM(s) Oral daily  chlorhexidine 0.12% Liquid 15 milliLiter(s) Oral Mucosa every 12 hours  chlorhexidine 4% Liquid 1 Application(s) Topical <User Schedule>  dextrose 50% Injectable 12.5 Gram(s) IV Push once  enoxaparin Injectable 40 milliGRAM(s) SubCutaneous daily  fentaNYL   Infusion. 0.5 MICROgram(s)/kG/Hr (4.54 mL/Hr) IV Continuous <Continuous>  insulin lispro (ADMELOG) corrective regimen sliding scale   SubCutaneous every 6 hours  methylPREDNISolone sodium succinate Injectable 40 milliGRAM(s) IV Push three times a day  norepinephrine Infusion 0.05 MICROgram(s)/kG/Min (4.38 mL/Hr) IV Continuous <Continuous>  pantoprazole  Injectable 40 milliGRAM(s) IV Push daily  phenylephrine    Infusion 2 MICROgram(s)/kG/Min (68 mL/Hr) IV Continuous <Continuous>  propofol Infusion 10 MICROgram(s)/kG/Min (5.44 mL/Hr) IV Continuous <Continuous>  risperiDONE   Tablet 1 milliGRAM(s) Oral daily  tiotropium 18 MICROgram(s) Capsule 1 Capsule(s) Inhalation daily  traZODone 150 milliGRAM(s) Oral at bedtime      MEDICATIONS  (PRN):  ALBUTerol    90 MICROgram(s) HFA Inhaler 2 Puff(s) Inhalation every 6 hours PRN Respiratory Distress  sodium chloride 0.9% lock flush 10 milliLiter(s) IV Push every 1 hour PRN Pre/post blood products, medications, blood draw, and to maintain line patency      REVIEW OF SYSTEMS:                           ALL ROS DONE [ X   ]    CONSTITUTIONAL:  LETHARGIC [   ], FEVER [   ], UNRESPONSIVE [   ]  CVS:  CP  [   ], SOB, [   ], PALPITATIONS [   ], DIZZYNESS [   ]  RS: COUGH [   ], SPUTUM [   ]  GI: ABDOMINAL PAIN [   ], NAUSEA [   ], VOMITINGS [   ], DIARRHEA [   ], CONSTIPATION [   ]  :  DYSURIA [   ], NOCTURIA [   ], INCREASED FREQUENCY [   ], DRIBLING [   ],  SKELETAL: PAINFUL JOINTS [   ], SWOLLEN JOINTS [   ], NECK ACHE [   ], LOW BACK ACHE [   ],  SKIN : ULCERS [   ], RASH [   ], ITCHING [   ]  CNS: HEAD ACHE [   ], DOUBLE VISION [   ], BLURRED VISION [   ], AMS / CONFUSION [   ], SEIZURES [   ], WEAKNESS [   ],TINGLING / NUMBNESS [   ]    PHYSICAL EXAMINATION:  GENERAL APPEARANCE: NO DISTRESS  HEENT:  NO PALLOR, NO  JVD,  NO   NODES, NECK SUPPLE  CVS: S1 +, S2 +,   RS: AEEB,  OCCASIONAL  RALES +,   NO RONCHI  ABD: SOFT, NT, NO, BS +  EXT: NO PE  SKIN: WARM,   CVC+  SKELETAL:  ROM ACCEPTABLE, LEFT ANKLE   CNS:  AAO X 0-1, NO  DEFICITS    RADIOLOGY :    EXAM:  XR CHEST PORTABLE URGENT 1V                            PROCEDURE DATE:  2021          INTERPRETATION:  History: Shortness of breath. Difficulty breathing.    FINDINGS: Frontal chest.    COMPARISON: 2021.    Exam is limited due to collimation at the lung bases.    Heart size may be exaggerated by AP technique. Mild bronchovascular crowding at the bases. There is no focal lung consolidation or sizable pleural effusion. No significant osseous abnormality. Degenerative change of the spine.    IMPRESSION:    Unremarkable frontal chest.    ==================================================    EXAM:  XR ANKLE COMP MIN 3 VIEWS LT                            PROCEDURE DATE:  2021          INTERPRETATION:  History: Fall, ankle pain.    FINDINGS: Frontal, lateral and oblique projections of the left ankle.    Diffuse soft tissue swelling. Osteopenia. No fracture or dislocation. Ankle mortise is aligned. Extensive vascular calcification.    IMPRESSION:    No fracture or dislocation left ankle appreciated.      ASSESSMENT :       PLAN:  HPI:  61 yo M with PMH including HTN, HLD, DM, CHF, CAD (w stents), COPD on 2.5L home 02 and night BIPAP (/ 35%) presents from [facility] for shortness of breath. When EMS arrived pt was noted to be in respiratory distress and his portable O2 machine was uncharged and not running. Pt endorsed to ED physician his SOB has worsened overnight after being discharged 1 day PTA. Off note Pt was here last night for left ankle pain, He was diagnosed with non-displaced left lateral malleolus fracture. Pt was noted to be tachycardic and sob but refused admission and discharged with cast. Pt was Ax0 x3 in ED but Upon my examination pt was lethargic and unable to answer any questions.  (2021 09:56)    # ACUTE ON CHRONIC HYPOXIC HYPERCAPNEIC RESPIRATORY FAILURE, EMPHYSEMA - INTUBATED, ON SOLUMEDROL, ON DUONEB  # MORBIDLY OBESE WITH RESTRICTIVE LUNG DISEASE, SUSPECT OBSTRUCTIVE SLEEP APNOEA , COPD ON O2 NC AT BASELINE  # NONDISPLACED LEFT LATERAL MALLEOLUS FRACTURE - PODIATRY CONSULT  # AMS - SUSPECT S/T HYPERCAPNEA   # HTN  # HLD  # CHRONIC CHF  # CAD S/P STENT  # DM  # TOBACCO ABUSE  #  GI AND DVT PROPHYLAXIS    NAEEM CABELLO MD COVERING ADRIANA CABELLO MD

## 2021-07-26 NOTE — DIETITIAN INITIAL EVALUATION ADULT. - PERTINENT LABORATORY DATA
07-26 Na134 mmol/L<L> Glu 248 mg/dL<H> K+ 4.8 mmol/L Cr  0.55 mg/dL BUN 22 mg/dL<H>   07-26 Phos 4.1 mg/dL   07-26 Alb 2.7 g/dL<L>     07-04 Chol 154 mg/dL LDL --    HDL 25 mg/dL<L> Trig 136 mg/dL    07-05-21 @ 09:39 HgbA1C 6.5  07-04-21 @ 21:42 HgbA1C 6.5      Vitamin D, 25-Hydroxy: 48.5 ng/mL (07-04-21 @ 21:42)

## 2021-07-26 NOTE — CONSULT NOTE ADULT - ASSESSMENT
A/P   DM   fracture ankle   P:  pt seen and eval   not ambulating   rec: light dressing ACE if no contradictions left foot /ankle   may need routine nail care   will re-eval in few days   thank you for this referral   please call [phone] with any pt related questions

## 2021-07-26 NOTE — DIETITIAN INITIAL EVALUATION ADULT. - OTHER INFO
Pt D/C 1 day PTA. Seen by this RD 7/7 (PO). Pt seen today, intubated. Propofol @28.1 ml/hr (if x24 hrs= 742 kcals fat.

## 2021-07-26 NOTE — CONSULT NOTE ADULT - SUBJECTIVE AND OBJECTIVE BOX
HPI:  59 yo M with PMH including HTN, HLD, DM, CHF, CAD (w stents), COPD on 2.5L home 02 and night BIPAP (12/5 35%) presents from [facility] for shortness of breath. When EMS arrived pt was noted to be in respiratory distress and his portable O2 machine was uncharged and not running. Pt endorsed to ED physician his SOB has worsened overnight after being discharged 1 day PTA. Off note Pt was here last night for left ankle pain, He was diagnosed with non-displaced left lateral malleolus fracture. Pt was noted to be tachycardic and sob but refused admission and discharged with cast. Pt was Ax0 x3 in ED but Upon my examination pt was lethargic and unable to answer any questions.  (25 Jul 2021 09:56)      ICU Vital Signs Last 24 Hrs  T(C): 36.9 (26 Jul 2021 16:06), Max: 37.2 (25 Jul 2021 19:20)  T(F): 98.5 (26 Jul 2021 16:06), Max: 99 (25 Jul 2021 19:20)  HR: 85 (26 Jul 2021 16:23) (57 - 100)  BP: 107/51 (26 Jul 2021 16:00) (71/38 - 141/57)  BP(mean): 65 (26 Jul 2021 16:00) (45 - 85)  ABP: 118/66 (26 Jul 2021 16:15) (80/69 - 126/67)  ABP(mean): 83 (26 Jul 2021 16:15) (74 - 87)  RR: 16 (26 Jul 2021 16:15) (9 - 26)  SpO2: 90% (26 Jul 2021 16:23) (90% - 100%)      Vital Signs Last 24 Hrs  T(C): 36.9 (26 Jul 2021 16:06), Max: 37.2 (25 Jul 2021 19:20)  T(F): 98.5 (26 Jul 2021 16:06), Max: 99 (25 Jul 2021 19:20)  HR: 85 (26 Jul 2021 16:23) (57 - 100)  BP: 107/51 (26 Jul 2021 16:00) (71/38 - 141/57)  BP(mean): 65 (26 Jul 2021 16:00) (45 - 85)  RR: 16 (26 Jul 2021 16:15) (9 - 26)  SpO2: 90% (26 Jul 2021 16:23) (90% - 100%)                        12.2   12.28 )-----------( 170      ( 26 Jul 2021 05:27 )             39.6     07-26    134<L>  |  95<L>  |  22<H>  ----------------------------<  248<H>  4.8   |  30  |  0.55    Ca    8.2<L>      26 Jul 2021 05:27  Phos  4.1     07-26  Mg     2.1     07-26    TPro  5.9<L>  /  Alb  2.7<L>  /  TBili  0.5  /  DBili  x   /  AST  15  /  ALT  27  /  AlkPhos  66  07-26        CAPILLARY BLOOD GLUCOSE      POCT Blood Glucose.: 251 mg/dL (26 Jul 2021 11:20)  POCT Blood Glucose.: 172 mg/dL (25 Jul 2021 23:30)  POCT Blood Glucose.: 98 mg/dL (25 Jul 2021 17:28)      MEDICATIONS  (STANDING):  artificial  tears Solution 1 Drop(s) Both EYES three times a day  aspirin  chewable 81 milliGRAM(s) Oral daily  chlorhexidine 0.12% Liquid 15 milliLiter(s) Oral Mucosa every 12 hours  chlorhexidine 4% Liquid 1 Application(s) Topical <User Schedule>  dextrose 50% Injectable 12.5 Gram(s) IV Push once  enoxaparin Injectable 40 milliGRAM(s) SubCutaneous daily  fentaNYL   Infusion. 0.5 MICROgram(s)/kG/Hr (4.54 mL/Hr) IV Continuous <Continuous>  insulin lispro (ADMELOG) corrective regimen sliding scale   SubCutaneous every 6 hours  methylPREDNISolone sodium succinate Injectable 40 milliGRAM(s) IV Push three times a day  norepinephrine Infusion 0.05 MICROgram(s)/kG/Min (4.38 mL/Hr) IV Continuous <Continuous>  pantoprazole  Injectable 40 milliGRAM(s) IV Push daily  phenylephrine    Infusion 2 MICROgram(s)/kG/Min (68 mL/Hr) IV Continuous <Continuous>  propofol Infusion 10 MICROgram(s)/kG/Min (5.44 mL/Hr) IV Continuous <Continuous>  risperiDONE   Tablet 1 milliGRAM(s) Oral daily  tiotropium 18 MICROgram(s) Capsule 1 Capsule(s) Inhalation daily  traZODone 150 milliGRAM(s) Oral at bedtime    MEDICATIONS  (PRN):  ALBUTerol    90 MICROgram(s) HFA Inhaler 2 Puff(s) Inhalation every 6 hours PRN Respiratory Distress  sodium chloride 0.9% lock flush 10 milliLiter(s) IV Push every 1 hour PRN Pre/post blood products, medications, blood draw, and to maintain line patency    Allergies    No Known Allergies    Intolerances      PAST MEDICAL & SURGICAL HISTORY:  COPD (chronic obstructive pulmonary disease)    Stented coronary artery    HLD (hyperlipidemia)    Pulmonary HTN    Type 2 diabetes mellitus without complication, with long-term current use of insulin    Coronary artery disease involving native coronary artery of native heart without angina pectoris    Bipolar 1 disorder    Smoker    Gastroesophageal reflux disease, esophagitis presence not specified    Oxygen dependent    COPD (chronic obstructive pulmonary disease)    DM (diabetes mellitus)    CAD (coronary artery disease)    GERD (gastroesophageal reflux disease)    HLD (hyperlipidemia)    Insomnia    Polyarthritis    No significant past surgical history      Social History:  Alcohol: unable to obtain due to mental status  Smoking: yes , pack of cigs in pockets (25 Jul 2021 09:56)     HPI:  59 yo M with PMH including HTN, HLD, DM, CHF, CAD (w stents), COPD on 2.5L home 02 and night BIPAP (12/5 35%) presents from [facility] for shortness of breath. When EMS arrived pt was noted to be in respiratory distress and his portable O2 machine was uncharged and not running. Pt endorsed to ED physician his SOB has worsened overnight after being discharged 1 day PTA. Off note Pt was here last night for left ankle pain, He was diagnosed with non-displaced left lateral malleolus fracture. Pt was noted to be tachycardic and sob but refused admission and discharged with cast. Pt was Ax0 x3 in ED but Upon my examination pt was lethargic and unable to answer any questions.  (25 Jul 2021 09:56)      ICU Vital Signs Last 24 Hrs  T(C): 36.9 (26 Jul 2021 16:06), Max: 37.2 (25 Jul 2021 19:20)  T(F): 98.5 (26 Jul 2021 16:06), Max: 99 (25 Jul 2021 19:20)  HR: 85 (26 Jul 2021 16:23) (57 - 100)  BP: 107/51 (26 Jul 2021 16:00) (71/38 - 141/57)  BP(mean): 65 (26 Jul 2021 16:00) (45 - 85)  ABP: 118/66 (26 Jul 2021 16:15) (80/69 - 126/67)  ABP(mean): 83 (26 Jul 2021 16:15) (74 - 87)  RR: 16 (26 Jul 2021 16:15) (9 - 26)  SpO2: 90% (26 Jul 2021 16:23) (90% - 100%)                              12.2   12.28 )-----------( 170      ( 26 Jul 2021 05:27 )             39.6     07-26    134<L>  |  95<L>  |  22<H>  ----------------------------<  248<H>  4.8   |  30  |  0.55    Ca    8.2<L>      26 Jul 2021 05:27  Phos  4.1     07-26  Mg     2.1     07-26    TPro  5.9<L>  /  Alb  2.7<L>  /  TBili  0.5  /  DBili  x   /  AST  15  /  ALT  27  /  AlkPhos  66  07-26        CAPILLARY BLOOD GLUCOSE      POCT Blood Glucose.: 251 mg/dL (26 Jul 2021 11:20)  POCT Blood Glucose.: 172 mg/dL (25 Jul 2021 23:30)  POCT Blood Glucose.: 98 mg/dL (25 Jul 2021 17:28)      MEDICATIONS  (STANDING):  artificial  tears Solution 1 Drop(s) Both EYES three times a day  aspirin  chewable 81 milliGRAM(s) Oral daily  chlorhexidine 0.12% Liquid 15 milliLiter(s) Oral Mucosa every 12 hours  chlorhexidine 4% Liquid 1 Application(s) Topical <User Schedule>  dextrose 50% Injectable 12.5 Gram(s) IV Push once  enoxaparin Injectable 40 milliGRAM(s) SubCutaneous daily  fentaNYL   Infusion. 0.5 MICROgram(s)/kG/Hr (4.54 mL/Hr) IV Continuous <Continuous>  insulin lispro (ADMELOG) corrective regimen sliding scale   SubCutaneous every 6 hours  methylPREDNISolone sodium succinate Injectable 40 milliGRAM(s) IV Push three times a day  norepinephrine Infusion 0.05 MICROgram(s)/kG/Min (4.38 mL/Hr) IV Continuous <Continuous>  pantoprazole  Injectable 40 milliGRAM(s) IV Push daily  phenylephrine    Infusion 2 MICROgram(s)/kG/Min (68 mL/Hr) IV Continuous <Continuous>  propofol Infusion 10 MICROgram(s)/kG/Min (5.44 mL/Hr) IV Continuous <Continuous>  risperiDONE   Tablet 1 milliGRAM(s) Oral daily  tiotropium 18 MICROgram(s) Capsule 1 Capsule(s) Inhalation daily  traZODone 150 milliGRAM(s) Oral at bedtime    MEDICATIONS  (PRN):  ALBUTerol    90 MICROgram(s) HFA Inhaler 2 Puff(s) Inhalation every 6 hours PRN Respiratory Distress  sodium chloride 0.9% lock flush 10 milliLiter(s) IV Push every 1 hour PRN Pre/post blood products, medications, blood draw, and to maintain line patency    Allergies    No Known Allergies    Intolerances      PAST MEDICAL & SURGICAL HISTORY:  COPD (chronic obstructive pulmonary disease)    Stented coronary artery    HLD (hyperlipidemia)    Pulmonary HTN    Type 2 diabetes mellitus without complication, with long-term current use of insulin    Coronary artery disease involving native coronary artery of native heart without angina pectoris    Bipolar 1 disorder    Smoker    Gastroesophageal reflux disease, esophagitis presence not specified    Oxygen dependent    COPD (chronic obstructive pulmonary disease)    DM (diabetes mellitus)    CAD (coronary artery disease)    GERD (gastroesophageal reflux disease)    HLD (hyperlipidemia)    Insomnia    Polyarthritis    No significant past surgical history      Social History:  Alcohol: unable to obtain due to mental status  Smoking: yes , pack of cigs in pockets (25 Jul 2021 09:56)  pt seen in ICU   intubated sedated  exam focused lower extremities   :  Vascular :   DP -palpable   TG -WNL   Neuro : n/a  MS:   N/a  Derm:  no bruising   no  erythema   no open skin lesions   no fracture blisters

## 2021-07-26 NOTE — PROGRESS NOTE ADULT - ASSESSMENT
61 yo M with PMH including HTN, HLD, DM, CHF, CAD (w stents), COPD on 2.5L home 02 and night BIPAP (12/5 35%) presents from [facility] for shortness of breath. ICU was consulted and admitted pt for Acute on chronic hypercapnic respiratory failure requiring intubation       Neuro:  Intubated and sedated w/ prop and fentanyl.  Patient arousable to tactile stimulation       Cardiovascular:  Patient has h/o CHF, on lasix at home  Does not look fluid overloaded, not in CHF exacerbation  will hold Lasix for now     #Hypotensive  -Get a-line and flowtrac    Pulmonary:   Acute on chronic Hypoxic, Hypercapnic Respiratory failure   ETT 7/25  vent: 16|500|50|5  7.37|59|79|34  hx of multiple exacerbations, >2 in past year, class D COPD   CXR negative for infiltrates  c/w IV solumedrol 40 q8h, de-escalate as needed   will hold off on abx  CXR and ABG qd     Infectious Diseases:  no active issues     Gastrointestinal:  TF    Renal:  no active issues     Endo:   Has h/o DM  Fq6 while NPO  c/w sliding scale    Heme/onc:   mild leukocytosis; likely reactive   will monitor for now     thrombocytopenia   plt < 140   170 on 7/26  will monitor for now    Skin/ catheter: 2 peripheral IV    Prophylaxis: lovenox qd     Goals of Care: Full code     Dispo: ICU

## 2021-07-26 NOTE — DIETITIAN INITIAL EVALUATION ADULT. - ADD RECOMMEND
Diet advancement per MD. Consider initiation TF with Glucerna 1.5 10 ml/hr, increasing ( with Propofol ~28 ml/hr):to 25 ml/hr x 24 hrs (600 ml, 900 kcals, 50 gm protein). Add 1 Pkt Prosiurce BID (+ 30 gm protein, 120 kcals).

## 2021-07-27 LAB
ALBUMIN SERPL ELPH-MCNC: 2.5 G/DL — LOW (ref 3.5–5)
ALP SERPL-CCNC: 56 U/L — SIGNIFICANT CHANGE UP (ref 40–120)
ALT FLD-CCNC: 25 U/L DA — SIGNIFICANT CHANGE UP (ref 10–60)
ANION GAP SERPL CALC-SCNC: 5 MMOL/L — SIGNIFICANT CHANGE UP (ref 5–17)
AST SERPL-CCNC: 7 U/L — LOW (ref 10–40)
BASE EXCESS BLDA CALC-SCNC: 7.6 MMOL/L — HIGH (ref -2–3)
BILIRUB SERPL-MCNC: 0.3 MG/DL — SIGNIFICANT CHANGE UP (ref 0.2–1.2)
BLOOD GAS COMMENTS ARTERIAL: SIGNIFICANT CHANGE UP
BUN SERPL-MCNC: 21 MG/DL — HIGH (ref 7–18)
CALCIUM SERPL-MCNC: 8.1 MG/DL — LOW (ref 8.4–10.5)
CHLORIDE SERPL-SCNC: 97 MMOL/L — SIGNIFICANT CHANGE UP (ref 96–108)
CO2 SERPL-SCNC: 33 MMOL/L — HIGH (ref 22–31)
CREAT SERPL-MCNC: 0.52 MG/DL — SIGNIFICANT CHANGE UP (ref 0.5–1.3)
GLUCOSE BLDC GLUCOMTR-MCNC: 240 MG/DL — HIGH (ref 70–99)
GLUCOSE BLDC GLUCOMTR-MCNC: 244 MG/DL — HIGH (ref 70–99)
GLUCOSE BLDC GLUCOMTR-MCNC: 246 MG/DL — HIGH (ref 70–99)
GLUCOSE BLDC GLUCOMTR-MCNC: 252 MG/DL — HIGH (ref 70–99)
GLUCOSE SERPL-MCNC: 239 MG/DL — HIGH (ref 70–99)
HCO3 BLDA-SCNC: 34 MMOL/L — HIGH (ref 21–28)
HCT VFR BLD CALC: 37.5 % — LOW (ref 39–50)
HGB BLD-MCNC: 11.6 G/DL — LOW (ref 13–17)
HOROWITZ INDEX BLDA+IHG-RTO: 50 — SIGNIFICANT CHANGE UP
MAGNESIUM SERPL-MCNC: 2.2 MG/DL — SIGNIFICANT CHANGE UP (ref 1.6–2.6)
MCHC RBC-ENTMCNC: 28 PG — SIGNIFICANT CHANGE UP (ref 27–34)
MCHC RBC-ENTMCNC: 30.9 GM/DL — LOW (ref 32–36)
MCV RBC AUTO: 90.6 FL — SIGNIFICANT CHANGE UP (ref 80–100)
NRBC # BLD: 0 /100 WBCS — SIGNIFICANT CHANGE UP (ref 0–0)
PCO2 BLDA: 57 MMHG — HIGH (ref 35–48)
PH BLDA: 7.39 — SIGNIFICANT CHANGE UP (ref 7.35–7.45)
PHOSPHATE SERPL-MCNC: 3.5 MG/DL — SIGNIFICANT CHANGE UP (ref 2.5–4.5)
PLATELET # BLD AUTO: 184 K/UL — SIGNIFICANT CHANGE UP (ref 150–400)
PO2 BLDA: 73 MMHG — LOW (ref 83–108)
POTASSIUM SERPL-MCNC: 4.7 MMOL/L — SIGNIFICANT CHANGE UP (ref 3.5–5.3)
POTASSIUM SERPL-SCNC: 4.7 MMOL/L — SIGNIFICANT CHANGE UP (ref 3.5–5.3)
PROT SERPL-MCNC: 5.5 G/DL — LOW (ref 6–8.3)
RBC # BLD: 4.14 M/UL — LOW (ref 4.2–5.8)
RBC # FLD: 13.6 % — SIGNIFICANT CHANGE UP (ref 10.3–14.5)
SAO2 % BLDA: 95 % — SIGNIFICANT CHANGE UP
SODIUM SERPL-SCNC: 135 MMOL/L — SIGNIFICANT CHANGE UP (ref 135–145)
WBC # BLD: 11.71 K/UL — HIGH (ref 3.8–10.5)
WBC # FLD AUTO: 11.71 K/UL — HIGH (ref 3.8–10.5)

## 2021-07-27 PROCEDURE — 71045 X-RAY EXAM CHEST 1 VIEW: CPT | Mod: 26

## 2021-07-27 RX ORDER — CEFEPIME 1 G/1
2000 INJECTION, POWDER, FOR SOLUTION INTRAMUSCULAR; INTRAVENOUS ONCE
Refills: 0 | Status: COMPLETED | OUTPATIENT
Start: 2021-07-27 | End: 2021-07-27

## 2021-07-27 RX ORDER — POLYETHYLENE GLYCOL 3350 17 G/17G
17 POWDER, FOR SOLUTION ORAL DAILY
Refills: 0 | Status: DISCONTINUED | OUTPATIENT
Start: 2021-07-27 | End: 2021-08-02

## 2021-07-27 RX ORDER — CEFEPIME 1 G/1
INJECTION, POWDER, FOR SOLUTION INTRAMUSCULAR; INTRAVENOUS
Refills: 0 | Status: DISCONTINUED | OUTPATIENT
Start: 2021-07-27 | End: 2021-07-29

## 2021-07-27 RX ORDER — SENNA PLUS 8.6 MG/1
2 TABLET ORAL AT BEDTIME
Refills: 0 | Status: DISCONTINUED | OUTPATIENT
Start: 2021-07-27 | End: 2021-08-02

## 2021-07-27 RX ORDER — ALBUTEROL 90 UG/1
2 AEROSOL, METERED ORAL EVERY 4 HOURS
Refills: 0 | Status: DISCONTINUED | OUTPATIENT
Start: 2021-07-27 | End: 2021-07-27

## 2021-07-27 RX ORDER — IPRATROPIUM BROMIDE 0.2 MG/ML
1 SOLUTION, NON-ORAL INHALATION EVERY 6 HOURS
Refills: 0 | Status: DISCONTINUED | OUTPATIENT
Start: 2021-07-27 | End: 2021-08-02

## 2021-07-27 RX ORDER — INSULIN GLARGINE 100 [IU]/ML
6 INJECTION, SOLUTION SUBCUTANEOUS AT BEDTIME
Refills: 0 | Status: DISCONTINUED | OUTPATIENT
Start: 2021-07-27 | End: 2021-07-28

## 2021-07-27 RX ORDER — DEXMEDETOMIDINE HYDROCHLORIDE IN 0.9% SODIUM CHLORIDE 4 UG/ML
0.5 INJECTION INTRAVENOUS
Qty: 400 | Refills: 0 | Status: DISCONTINUED | OUTPATIENT
Start: 2021-07-27 | End: 2021-07-28

## 2021-07-27 RX ORDER — DEXMEDETOMIDINE HYDROCHLORIDE IN 0.9% SODIUM CHLORIDE 4 UG/ML
0.2 INJECTION INTRAVENOUS
Qty: 200 | Refills: 0 | Status: DISCONTINUED | OUTPATIENT
Start: 2021-07-27 | End: 2021-07-27

## 2021-07-27 RX ORDER — CEFEPIME 1 G/1
2000 INJECTION, POWDER, FOR SOLUTION INTRAMUSCULAR; INTRAVENOUS EVERY 12 HOURS
Refills: 0 | Status: DISCONTINUED | OUTPATIENT
Start: 2021-07-28 | End: 2021-07-29

## 2021-07-27 RX ORDER — ALBUTEROL 90 UG/1
2 AEROSOL, METERED ORAL EVERY 6 HOURS
Refills: 0 | Status: DISCONTINUED | OUTPATIENT
Start: 2021-07-27 | End: 2021-08-02

## 2021-07-27 RX ORDER — CHLORHEXIDINE GLUCONATE 213 G/1000ML
1 SOLUTION TOPICAL
Refills: 0 | Status: DISCONTINUED | OUTPATIENT
Start: 2021-07-27 | End: 2021-08-02

## 2021-07-27 RX ADMIN — Medication 2: at 06:18

## 2021-07-27 RX ADMIN — CHLORHEXIDINE GLUCONATE 1 APPLICATION(S): 213 SOLUTION TOPICAL at 13:06

## 2021-07-27 RX ADMIN — CEFEPIME 100 MILLIGRAM(S): 1 INJECTION, POWDER, FOR SOLUTION INTRAMUSCULAR; INTRAVENOUS at 18:07

## 2021-07-27 RX ADMIN — DEXMEDETOMIDINE HYDROCHLORIDE IN 0.9% SODIUM CHLORIDE 11.7 MICROGRAM(S)/KG/HR: 4 INJECTION INTRAVENOUS at 23:22

## 2021-07-27 RX ADMIN — Medication 1 DROP(S): at 13:05

## 2021-07-27 RX ADMIN — Medication 1 PUFF(S): at 20:33

## 2021-07-27 RX ADMIN — INSULIN GLARGINE 6 UNIT(S): 100 INJECTION, SOLUTION SUBCUTANEOUS at 23:13

## 2021-07-27 RX ADMIN — CHLORHEXIDINE GLUCONATE 1 APPLICATION(S): 213 SOLUTION TOPICAL at 05:57

## 2021-07-27 RX ADMIN — PANTOPRAZOLE SODIUM 40 MILLIGRAM(S): 20 TABLET, DELAYED RELEASE ORAL at 11:31

## 2021-07-27 RX ADMIN — Medication 40 MILLIGRAM(S): at 23:10

## 2021-07-27 RX ADMIN — ENOXAPARIN SODIUM 40 MILLIGRAM(S): 100 INJECTION SUBCUTANEOUS at 11:31

## 2021-07-27 RX ADMIN — POLYETHYLENE GLYCOL 3350 17 GRAM(S): 17 POWDER, FOR SOLUTION ORAL at 12:36

## 2021-07-27 RX ADMIN — ALBUTEROL 2 PUFF(S): 90 AEROSOL, METERED ORAL at 17:45

## 2021-07-27 RX ADMIN — Medication 40 MILLIGRAM(S): at 06:02

## 2021-07-27 RX ADMIN — Medication 150 MILLIGRAM(S): at 23:09

## 2021-07-27 RX ADMIN — RISPERIDONE 1 MILLIGRAM(S): 4 TABLET ORAL at 11:31

## 2021-07-27 RX ADMIN — ALBUTEROL 2 PUFF(S): 90 AEROSOL, METERED ORAL at 20:33

## 2021-07-27 RX ADMIN — CHLORHEXIDINE GLUCONATE 15 MILLILITER(S): 213 SOLUTION TOPICAL at 17:14

## 2021-07-27 RX ADMIN — Medication 1 DROP(S): at 23:11

## 2021-07-27 RX ADMIN — Medication 1 DROP(S): at 05:59

## 2021-07-27 RX ADMIN — CEFEPIME 100 MILLIGRAM(S): 1 INJECTION, POWDER, FOR SOLUTION INTRAMUSCULAR; INTRAVENOUS at 06:00

## 2021-07-27 RX ADMIN — DEXMEDETOMIDINE HYDROCHLORIDE IN 0.9% SODIUM CHLORIDE 11.7 MICROGRAM(S)/KG/HR: 4 INJECTION INTRAVENOUS at 10:48

## 2021-07-27 RX ADMIN — POLYETHYLENE GLYCOL 3350 17 GRAM(S): 17 POWDER, FOR SOLUTION ORAL at 06:01

## 2021-07-27 RX ADMIN — DEXMEDETOMIDINE HYDROCHLORIDE IN 0.9% SODIUM CHLORIDE 11.7 MICROGRAM(S)/KG/HR: 4 INJECTION INTRAVENOUS at 23:10

## 2021-07-27 RX ADMIN — PROPOFOL 5.44 MICROGRAM(S)/KG/MIN: 10 INJECTION, EMULSION INTRAVENOUS at 21:00

## 2021-07-27 RX ADMIN — ALBUTEROL 2 PUFF(S): 90 AEROSOL, METERED ORAL at 14:08

## 2021-07-27 RX ADMIN — Medication 40 MILLIGRAM(S): at 13:05

## 2021-07-27 RX ADMIN — DEXMEDETOMIDINE HYDROCHLORIDE IN 0.9% SODIUM CHLORIDE 11.7 MICROGRAM(S)/KG/HR: 4 INJECTION INTRAVENOUS at 14:40

## 2021-07-27 RX ADMIN — Medication 2: at 11:30

## 2021-07-27 RX ADMIN — Medication 3: at 23:45

## 2021-07-27 RX ADMIN — SENNA PLUS 2 TABLET(S): 8.6 TABLET ORAL at 23:10

## 2021-07-27 RX ADMIN — PROPOFOL 5.44 MICROGRAM(S)/KG/MIN: 10 INJECTION, EMULSION INTRAVENOUS at 03:50

## 2021-07-27 RX ADMIN — Medication 1 PUFF(S): at 14:08

## 2021-07-27 RX ADMIN — CHLORHEXIDINE GLUCONATE 15 MILLILITER(S): 213 SOLUTION TOPICAL at 05:57

## 2021-07-27 RX ADMIN — FENTANYL CITRATE 4.54 MICROGRAM(S)/KG/HR: 50 INJECTION INTRAVENOUS at 04:20

## 2021-07-27 RX ADMIN — Medication 81 MILLIGRAM(S): at 11:31

## 2021-07-27 RX ADMIN — Medication 2: at 17:14

## 2021-07-27 NOTE — PROGRESS NOTE ADULT - SUBJECTIVE AND OBJECTIVE BOX
Patient is a 60y old  Male who presents with a chief complaint of AHHRF (2021 06:55)    PATIENT IS SEEN AND EXAMINED IN MEDICAL FLOOR.  NGT [    ]    MT [   ]      GT [   ]    ALLERGIES:  No Known Allergies      Daily     Daily Weight in k.7 (2021 07:45)    VITALS:    Vital Signs Last 24 Hrs  T(C): 36.8 (2021 07:45), Max: 37.2 (2021 19:17)  T(F): 98.2 (2021 07:45), Max: 99 (2021 19:17)  HR: 70 (2021 11:44) (62 - 89)  BP: 100/65 (2021 11:15) (100/65 - 115/62)  BP(mean): 74 (2021 11:15) (65 - 81)  RR: 12 (2021 11:15) (11 - 20)  SpO2: 95% (2021 11:44) (89% - 97%)    LABS:    CBC Full  -  ( 2021 03:54 )  WBC Count : 11.71 K/uL  RBC Count : 4.14 M/uL  Hemoglobin : 11.6 g/dL  Hematocrit : 37.5 %  Platelet Count - Automated : 184 K/uL  Mean Cell Volume : 90.6 fl  Mean Cell Hemoglobin : 28.0 pg  Mean Cell Hemoglobin Concentration : 30.9 gm/dL  Auto Neutrophil # : x  Auto Lymphocyte # : x  Auto Monocyte # : x  Auto Eosinophil # : x  Auto Basophil # : x  Auto Neutrophil % : x  Auto Lymphocyte % : x  Auto Monocyte % : x  Auto Eosinophil % : x  Auto Basophil % : x          135  |  97  |  21<H>  ----------------------------<  239<H>  4.7   |  33<H>  |  0.52    Ca    8.1<L>      2021 03:54  Phos  3.5       Mg     2.2         TPro  5.5<L>  /  Alb  2.5<L>  /  TBili  0.3  /  DBili  x   /  AST  7<L>  /  ALT  25  /  AlkPhos  56      CAPILLARY BLOOD GLUCOSE      POCT Blood Glucose.: 244 mg/dL (2021 11:22)  POCT Blood Glucose.: 240 mg/dL (2021 06:17)  POCT Blood Glucose.: 245 mg/dL (2021 22:56)  POCT Blood Glucose.: 244 mg/dL (2021 16:48)    CARDIAC MARKERS ( 2021 13:51 )  <0.015 ng/mL / x     / 12 U/L / x     / 1.1 ng/mL  CARDIAC MARKERS ( 2021 15:25 )  0.016 ng/mL / x     / x     / x     / x          LIVER FUNCTIONS - ( 2021 03:54 )  Alb: 2.5 g/dL / Pro: 5.5 g/dL / ALK PHOS: 56 U/L / ALT: 25 U/L DA / AST: 7 U/L / GGT: x           Creatinine Trend: 0.52<--, 0.55<--, 0.42<--, 0.56<--, 0.50<--, 0.63<--  I&O's Summary    2021 07:01  -  2021 07:00  --------------------------------------------------------  IN: 1366.4 mL / OUT: 1250 mL / NET: 116.4 mL    2021 07:01  -  2021 12:25  --------------------------------------------------------  IN: 105.6 mL / OUT: 125 mL / NET: -19.4 mL        ABG - ( 2021 04:06 )  pH, Arterial: 7.39  pH, Blood: x     /  pCO2: 57    /  pO2: 73    / HCO3: 34    / Base Excess: 7.6   /  SaO2: 95                      MEDICATIONS:    MEDICATIONS  (STANDING):  ALBUTerol    90 MICROgram(s) HFA Inhaler 2 Puff(s) Inhalation every 4 hours  artificial  tears Solution 1 Drop(s) Both EYES three times a day  aspirin  chewable 81 milliGRAM(s) Oral daily  chlorhexidine 0.12% Liquid 15 milliLiter(s) Oral Mucosa every 12 hours  chlorhexidine 2% Cloths 1 Application(s) Topical <User Schedule>  dexMEDEtomidine Infusion 0.5 MICROgram(s)/kG/Hr (11.7 mL/Hr) IV Continuous <Continuous>  dextrose 50% Injectable 12.5 Gram(s) IV Push once  enoxaparin Injectable 40 milliGRAM(s) SubCutaneous daily  fentaNYL   Infusion. 0.5 MICROgram(s)/kG/Hr (4.54 mL/Hr) IV Continuous <Continuous>  insulin glargine Injectable (LANTUS) 6 Unit(s) SubCutaneous at bedtime  insulin lispro (ADMELOG) corrective regimen sliding scale   SubCutaneous every 6 hours  ipratropium 17 MICROgram(s) HFA Inhaler 1 Puff(s) Inhalation every 6 hours  methylPREDNISolone sodium succinate Injectable 40 milliGRAM(s) IV Push three times a day  norepinephrine Infusion 0.05 MICROgram(s)/kG/Min (4.38 mL/Hr) IV Continuous <Continuous>  pantoprazole  Injectable 40 milliGRAM(s) IV Push daily  phenylephrine    Infusion 2 MICROgram(s)/kG/Min (68 mL/Hr) IV Continuous <Continuous>  polyethylene glycol 3350 17 Gram(s) Oral daily  propofol Infusion 10 MICROgram(s)/kG/Min (5.44 mL/Hr) IV Continuous <Continuous>  risperiDONE   Tablet 1 milliGRAM(s) Oral daily  senna 2 Tablet(s) Oral at bedtime  traZODone 150 milliGRAM(s) Oral at bedtime      MEDICATIONS  (PRN):  sodium chloride 0.9% lock flush 10 milliLiter(s) IV Push every 1 hour PRN Pre/post blood products, medications, blood draw, and to maintain line patency      REVIEW OF SYSTEMS:                           ALL ROS DONE [ X   ]    CONSTITUTIONAL:  LETHARGIC [   ], FEVER [   ], UNRESPONSIVE [   ]  CVS:  CP  [   ], SOB, [   ], PALPITATIONS [   ], DIZZYNESS [   ]  RS: COUGH [   ], SPUTUM [   ]  GI: ABDOMINAL PAIN [   ], NAUSEA [   ], VOMITINGS [   ], DIARRHEA [   ], CONSTIPATION [   ]  :  DYSURIA [   ], NOCTURIA [   ], INCREASED FREQUENCY [   ], DRIBLING [   ],  SKELETAL: PAINFUL JOINTS [   ], SWOLLEN JOINTS [   ], NECK ACHE [   ], LOW BACK ACHE [   ],  SKIN : ULCERS [   ], RASH [   ], ITCHING [   ]  CNS: HEAD ACHE [   ], DOUBLE VISION [   ], BLURRED VISION [   ], AMS / CONFUSION [   ], SEIZURES [   ], WEAKNESS [   ],TINGLING / NUMBNESS [   ]    PHYSICAL EXAMINATION:  GENERAL APPEARANCE: NO DISTRESS  HEENT:  NO PALLOR, NO  JVD,  NO   NODES, NECK SUPPLE  CVS: S1 +, S2 +,   RS: AEEB,  OCCASIONAL  RALES +,   NO RONCHI  ABD: SOFT, NT, NO, BS +  EXT: NO PE  SKIN: WARM,   CVC+  SKELETAL:  ROM ACCEPTABLE, LEFT ANKLE   CNS:  AAO X 0-1, NO  DEFICITS    RADIOLOGY :    EXAM:  XR CHEST PORTABLE URGENT 1V                            PROCEDURE DATE:  2021          INTERPRETATION:  History: Shortness of breath. Difficulty breathing.    FINDINGS: Frontal chest.    COMPARISON: 2021.    Exam is limited due to collimation at the lung bases.    Heart size may be exaggerated by AP technique. Mild bronchovascular crowding at the bases. There is no focal lung consolidation or sizable pleural effusion. No significant osseous abnormality. Degenerative change of the spine.    IMPRESSION:    Unremarkable frontal chest.    ==================================================    EXAM:  XR ANKLE COMP MIN 3 VIEWS LT                            PROCEDURE DATE:  2021          INTERPRETATION:  History: Fall, ankle pain.    FINDINGS: Frontal, lateral and oblique projections of the left ankle.    Diffuse soft tissue swelling. Osteopenia. No fracture or dislocation. Ankle mortise is aligned. Extensive vascular calcification.    IMPRESSION:    No fracture or dislocation left ankle appreciated.      ASSESSMENT :       PLAN:  HPI:  59 yo M with PMH including HTN, HLD, DM, CHF, CAD (w stents), COPD on 2.5L home 02 and night BIPAP ( 35%) presents from [facility] for shortness of breath. When EMS arrived pt was noted to be in respiratory distress and his portable O2 machine was uncharged and not running. Pt endorsed to ED physician his SOB has worsened overnight after being discharged 1 day PTA. Off note Pt was here last night for left ankle pain, He was diagnosed with non-displaced left lateral malleolus fracture. Pt was noted to be tachycardic and sob but refused admission and discharged with cast. Pt was Ax0 x3 in ED but Upon my examination pt was lethargic and unable to answer any questions.  (2021 09:56)    # ACUTE ON CHRONIC HYPOXIC HYPERCAPNEIC RESPIRATORY FAILURE, EMPHYSEMA - INTUBATED, ON SOLUMEDROL, ON DUONEB  # MORBIDLY OBESE WITH RESTRICTIVE LUNG DISEASE, SUSPECT OBSTRUCTIVE SLEEP APNOEA , COPD ON O2 NC AT BASELINE  # NONDISPLACED LEFT LATERAL MALLEOLUS FRACTURE - PODIATRY CONSULT  # AMS - SUSPECT S/T HYPERCAPNEA   # HTN  # HLD  # CHRONIC CHF  # CAD S/P STENT  # DM  # TOBACCO ABUSE  #  GI AND DVT PROPHYLAXIS    NAEEM CABELLO MD COVERING ADRIANA CABELLO MD     Patient is a 60y old  Male who presents with a chief complaint of AHHRF (2021 06:55)    PATIENT IS SEEN AND EXAMINED IN MEDICAL FLOOR.      ALLERGIES:  No Known Allergies      Daily     Daily Weight in k.7 (2021 07:45)    VITALS:    Vital Signs Last 24 Hrs  T(C): 36.8 (2021 07:45), Max: 37.2 (2021 19:17)  T(F): 98.2 (2021 07:45), Max: 99 (2021 19:17)  HR: 70 (2021 11:44) (62 - 89)  BP: 100/65 (2021 11:15) (100/65 - 115/62)  BP(mean): 74 (2021 11:15) (65 - 81)  RR: 12 (2021 11:15) (11 - 20)  SpO2: 95% (2021 11:44) (89% - 97%)    LABS:    CBC Full  -  ( 2021 03:54 )  WBC Count : 11.71 K/uL  RBC Count : 4.14 M/uL  Hemoglobin : 11.6 g/dL  Hematocrit : 37.5 %  Platelet Count - Automated : 184 K/uL  Mean Cell Volume : 90.6 fl  Mean Cell Hemoglobin : 28.0 pg  Mean Cell Hemoglobin Concentration : 30.9 gm/dL  Auto Neutrophil # : x  Auto Lymphocyte # : x  Auto Monocyte # : x  Auto Eosinophil # : x  Auto Basophil # : x  Auto Neutrophil % : x  Auto Lymphocyte % : x  Auto Monocyte % : x  Auto Eosinophil % : x  Auto Basophil % : x          135  |  97  |  21<H>  ----------------------------<  239<H>  4.7   |  33<H>  |  0.52    Ca    8.1<L>      2021 03:54  Phos  3.5       Mg     2.2         TPro  5.5<L>  /  Alb  2.5<L>  /  TBili  0.3  /  DBili  x   /  AST  7<L>  /  ALT  25  /  AlkPhos  56      CAPILLARY BLOOD GLUCOSE      POCT Blood Glucose.: 244 mg/dL (2021 11:22)  POCT Blood Glucose.: 240 mg/dL (2021 06:17)  POCT Blood Glucose.: 245 mg/dL (2021 22:56)  POCT Blood Glucose.: 244 mg/dL (2021 16:48)    CARDIAC MARKERS ( 2021 13:51 )  <0.015 ng/mL / x     / 12 U/L / x     / 1.1 ng/mL  CARDIAC MARKERS ( 2021 15:25 )  0.016 ng/mL / x     / x     / x     / x          LIVER FUNCTIONS - ( 2021 03:54 )  Alb: 2.5 g/dL / Pro: 5.5 g/dL / ALK PHOS: 56 U/L / ALT: 25 U/L DA / AST: 7 U/L / GGT: x           Creatinine Trend: 0.52<--, 0.55<--, 0.42<--, 0.56<--, 0.50<--, 0.63<--  I&O's Summary    2021 07:01  -  2021 07:00  --------------------------------------------------------  IN: 1366.4 mL / OUT: 1250 mL / NET: 116.4 mL    2021 07:01  -  2021 12:25  --------------------------------------------------------  IN: 105.6 mL / OUT: 125 mL / NET: -19.4 mL        ABG - ( 2021 04:06 )  pH, Arterial: 7.39  pH, Blood: x     /  pCO2: 57    /  pO2: 73    / HCO3: 34    / Base Excess: 7.6   /  SaO2: 95                      MEDICATIONS:    MEDICATIONS  (STANDING):  ALBUTerol    90 MICROgram(s) HFA Inhaler 2 Puff(s) Inhalation every 4 hours  artificial  tears Solution 1 Drop(s) Both EYES three times a day  aspirin  chewable 81 milliGRAM(s) Oral daily  chlorhexidine 0.12% Liquid 15 milliLiter(s) Oral Mucosa every 12 hours  chlorhexidine 2% Cloths 1 Application(s) Topical <User Schedule>  dexMEDEtomidine Infusion 0.5 MICROgram(s)/kG/Hr (11.7 mL/Hr) IV Continuous <Continuous>  dextrose 50% Injectable 12.5 Gram(s) IV Push once  enoxaparin Injectable 40 milliGRAM(s) SubCutaneous daily  fentaNYL   Infusion. 0.5 MICROgram(s)/kG/Hr (4.54 mL/Hr) IV Continuous <Continuous>  insulin glargine Injectable (LANTUS) 6 Unit(s) SubCutaneous at bedtime  insulin lispro (ADMELOG) corrective regimen sliding scale   SubCutaneous every 6 hours  ipratropium 17 MICROgram(s) HFA Inhaler 1 Puff(s) Inhalation every 6 hours  methylPREDNISolone sodium succinate Injectable 40 milliGRAM(s) IV Push three times a day  norepinephrine Infusion 0.05 MICROgram(s)/kG/Min (4.38 mL/Hr) IV Continuous <Continuous>  pantoprazole  Injectable 40 milliGRAM(s) IV Push daily  phenylephrine    Infusion 2 MICROgram(s)/kG/Min (68 mL/Hr) IV Continuous <Continuous>  polyethylene glycol 3350 17 Gram(s) Oral daily  propofol Infusion 10 MICROgram(s)/kG/Min (5.44 mL/Hr) IV Continuous <Continuous>  risperiDONE   Tablet 1 milliGRAM(s) Oral daily  senna 2 Tablet(s) Oral at bedtime  traZODone 150 milliGRAM(s) Oral at bedtime      MEDICATIONS  (PRN):  sodium chloride 0.9% lock flush 10 milliLiter(s) IV Push every 1 hour PRN Pre/post blood products, medications, blood draw, and to maintain line patency      REVIEW OF SYSTEMS:                           ALL ROS DONE [ X   ]    CONSTITUTIONAL:  LETHARGIC [   ], FEVER [   ], UNRESPONSIVE [   ]  CVS:  CP  [   ], SOB, [   ], PALPITATIONS [   ], DIZZYNESS [   ]  RS: COUGH [   ], SPUTUM [   ]  GI: ABDOMINAL PAIN [   ], NAUSEA [   ], VOMITINGS [   ], DIARRHEA [   ], CONSTIPATION [   ]  :  DYSURIA [   ], NOCTURIA [   ], INCREASED FREQUENCY [   ], DRIBLING [   ],  SKELETAL: PAINFUL JOINTS [   ], SWOLLEN JOINTS [   ], NECK ACHE [   ], LOW BACK ACHE [   ],  SKIN : ULCERS [   ], RASH [   ], ITCHING [   ]  CNS: HEAD ACHE [   ], DOUBLE VISION [   ], BLURRED VISION [   ], AMS / CONFUSION [   ], SEIZURES [   ], WEAKNESS [   ],TINGLING / NUMBNESS [   ]    PHYSICAL EXAMINATION:  GENERAL APPEARANCE: NO DISTRESS  HEENT:  NO PALLOR, NO  JVD,  NO   NODES, NECK SUPPLE  CVS: S1 +, S2 +,   RS: AEEB,  OCCASIONAL  RALES +,   NO RONCHI  ABD: SOFT, NT, NO, BS +  EXT: NO PE  SKIN: WARM,   CVC+  SKELETAL:  ROM ACCEPTABLE, LEFT ANKLE   CNS:  AAO X 0-1, NO  DEFICITS    RADIOLOGY :    EXAM:  XR CHEST PORTABLE URGENT 1V                            PROCEDURE DATE:  2021          INTERPRETATION:  History: Shortness of breath. Difficulty breathing.    FINDINGS: Frontal chest.    COMPARISON: 2021.    Exam is limited due to collimation at the lung bases.    Heart size may be exaggerated by AP technique. Mild bronchovascular crowding at the bases. There is no focal lung consolidation or sizable pleural effusion. No significant osseous abnormality. Degenerative change of the spine.    IMPRESSION:    Unremarkable frontal chest.    ==================================================    EXAM:  XR ANKLE COMP MIN 3 VIEWS LT                            PROCEDURE DATE:  2021          INTERPRETATION:  History: Fall, ankle pain.    FINDINGS: Frontal, lateral and oblique projections of the left ankle.    Diffuse soft tissue swelling. Osteopenia. No fracture or dislocation. Ankle mortise is aligned. Extensive vascular calcification.    IMPRESSION:    No fracture or dislocation left ankle appreciated.      ASSESSMENT :       PLAN:  HPI:  59 yo M with PMH including HTN, HLD, DM, CHF, CAD (w stents), COPD on 2.5L home 02 and night BIPAP ( 35%) presents from [facility] for shortness of breath. When EMS arrived pt was noted to be in respiratory distress and his portable O2 machine was uncharged and not running. Pt endorsed to ED physician his SOB has worsened overnight after being discharged 1 day PTA. Off note Pt was here last night for left ankle pain, He was diagnosed with non-displaced left lateral malleolus fracture. Pt was noted to be tachycardic and sob but refused admission and discharged with cast. Pt was Ax0 x3 in ED but Upon my examination pt was lethargic and unable to answer any questions.  (2021 09:56)    # ACUTE ON CHRONIC HYPOXIC HYPERCAPNEIC RESPIRATORY FAILURE, EMPHYSEMA - INTUBATED, ON SOLUMEDROL, ON DUONEB  - TRYING TO WEAN SEDATION FOR SBT  # MORBIDLY OBESE WITH RESTRICTIVE LUNG DISEASE, SUSPECT OBSTRUCTIVE SLEEP APNOEA , COPD ON O2 NC AT BASELINE  # NONDISPLACED LEFT LATERAL MALLEOLUS FRACTURE - PODIATRY CONSULT  # AMS - SUSPECT S/T HYPERCAPNEA   # HTN  # HLD  # CHRONIC CHF  # CAD S/P STENT  # DM  # TOBACCO ABUSE  #  GI AND DVT PROPHYLAXIS    NAEEM CABELLO MD COVERING ADRIANA CABELLO MD

## 2021-07-27 NOTE — PROGRESS NOTE ADULT - ASSESSMENT
59 yo M with PMH including HTN, HLD, DM, CHF, CAD (w stents), COPD on 2.5L home 02 and night BIPAP (12/5 35%) presents from [facility] for shortness of breath. ICU was consulted and admitted pt for Acute on chronic hypercapnic respiratory failure requiring intubation       Neuro:  Intubated and sedated w/ prop and fentanyl.  Patient arousable to tactile stimulation       Cardiovascular:  Patient has h/o CHF, on lasix at home  Does not look fluid overloaded, not in CHF exacerbation  will hold Lasix for now     #Hypotensive  -Get a-line and flowtrac    Pulmonary:   Acute on chronic Hypoxic, Hypercapnic Respiratory failure   ETT 7/25  vent: 16|500|50|5  7.37|59|79|34  hx of multiple exacerbations, >2 in past year, class D COPD   CXR negative for infiltrates  c/w IV solumedrol 40 q8h, de-escalate as needed   will hold off on abx  CXR and ABG qd     Infectious Diseases:  no active issues     Gastrointestinal:  TF    Renal:  no active issues     Endo:   Has h/o DM  Fq6 while NPO  c/w sliding scale    Heme/onc:   mild leukocytosis; likely reactive   will monitor for now     thrombocytopenia   plt < 140   170 on 7/26  will monitor for now    Skin/ catheter: 2 peripheral IV    Prophylaxis: lovenox qd     Goals of Care: Full code     Dispo: ICU          61 yo M with PMH including HTN, HLD, DM, CHF, CAD (w stents), COPD on 2.5L home 02 and night BIPAP (/ 35%) presents from [facility] for shortness of breath. ICU was consulted and admitted pt for Acute on chronic hypercapnic respiratory failure requiring intubation       Neuro:  Intubated and sedated w/ prop and fentanyl.  Opens eyes to sternal rub  d/c fentanyl, d/t propofol  Begin awakening trials       Cardiovascular:  Patient has h/o CHF, on lasix at home  Does not look fluid overloaded, not in CHF exacerbation  will hold Lasix for now       Pulmonary:   Acute on chronic Hypoxic, Hypercapnic Respiratory failure   ETT   vent: 16|500|50|5  7.39|57|73|34  hx of multiple exacerbations, >2 in past year, class D COPD   CXR negative for infiltrates  c/w IV solumedrol 40 q8h, de-escalate as needed   will hold off on abx  CXR and ABG qd  Come off sedation --> SBTs    Infectious Diseases:  no active issues     Gastrointestinal:  TF    Renal:  no active issues     Endo:   Has h/o DM  B  start lantus 6  c/w sliding scale    Heme/onc:   mild leukocytosis; likely reactive   will monitor for now     thrombocytopenia   plt < 140   184 on   ctm    Skin/ catheter: 2 peripheral IV, RRAL, RIJ    Prophylaxis: lovenox qd     Goals of Care: Full code     Dispo: ICU

## 2021-07-27 NOTE — PROGRESS NOTE ADULT - SUBJECTIVE AND OBJECTIVE BOX
INTERVAL HPI/OVERNIGHT EVENTS: ***    PRESSORS: [ ] YES [ ] NO  WHICH:      Antimicrobial:    Cardiovascular:  norepinephrine Infusion 0.05 MICROgram(s)/kG/Min IV Continuous <Continuous>  phenylephrine    Infusion 2 MICROgram(s)/kG/Min IV Continuous <Continuous>    Pulmonary:  ALBUTerol    90 MICROgram(s) HFA Inhaler 2 Puff(s) Inhalation every 6 hours PRN  tiotropium 18 MICROgram(s) Capsule 1 Capsule(s) Inhalation daily    Hematalogic:  aspirin  chewable 81 milliGRAM(s) Oral daily  enoxaparin Injectable 40 milliGRAM(s) SubCutaneous daily    Other:  artificial  tears Solution 1 Drop(s) Both EYES three times a day  chlorhexidine 0.12% Liquid 15 milliLiter(s) Oral Mucosa every 12 hours  chlorhexidine 4% Liquid 1 Application(s) Topical <User Schedule>  dextrose 50% Injectable 12.5 Gram(s) IV Push once  fentaNYL   Infusion. 0.5 MICROgram(s)/kG/Hr IV Continuous <Continuous>  insulin lispro (ADMELOG) corrective regimen sliding scale   SubCutaneous every 6 hours  methylPREDNISolone sodium succinate Injectable 40 milliGRAM(s) IV Push three times a day  pantoprazole  Injectable 40 milliGRAM(s) IV Push daily  polyethylene glycol 3350 17 Gram(s) Oral daily  propofol Infusion 10 MICROgram(s)/kG/Min IV Continuous <Continuous>  risperiDONE   Tablet 1 milliGRAM(s) Oral daily  senna 2 Tablet(s) Oral at bedtime  sodium chloride 0.9% lock flush 10 milliLiter(s) IV Push every 1 hour PRN  traZODone 150 milliGRAM(s) Oral at bedtime    ALBUTerol    90 MICROgram(s) HFA Inhaler 2 Puff(s) Inhalation every 6 hours PRN  artificial  tears Solution 1 Drop(s) Both EYES three times a day  aspirin  chewable 81 milliGRAM(s) Oral daily  chlorhexidine 0.12% Liquid 15 milliLiter(s) Oral Mucosa every 12 hours  chlorhexidine 4% Liquid 1 Application(s) Topical <User Schedule>  dextrose 50% Injectable 12.5 Gram(s) IV Push once  enoxaparin Injectable 40 milliGRAM(s) SubCutaneous daily  fentaNYL   Infusion. 0.5 MICROgram(s)/kG/Hr IV Continuous <Continuous>  insulin lispro (ADMELOG) corrective regimen sliding scale   SubCutaneous every 6 hours  methylPREDNISolone sodium succinate Injectable 40 milliGRAM(s) IV Push three times a day  norepinephrine Infusion 0.05 MICROgram(s)/kG/Min IV Continuous <Continuous>  pantoprazole  Injectable 40 milliGRAM(s) IV Push daily  phenylephrine    Infusion 2 MICROgram(s)/kG/Min IV Continuous <Continuous>  polyethylene glycol 3350 17 Gram(s) Oral daily  propofol Infusion 10 MICROgram(s)/kG/Min IV Continuous <Continuous>  risperiDONE   Tablet 1 milliGRAM(s) Oral daily  senna 2 Tablet(s) Oral at bedtime  sodium chloride 0.9% lock flush 10 milliLiter(s) IV Push every 1 hour PRN  tiotropium 18 MICROgram(s) Capsule 1 Capsule(s) Inhalation daily  traZODone 150 milliGRAM(s) Oral at bedtime    Drug Dosing Weight  Height (cm): 172.7 (25 Jul 2021 07:05)  Weight (kg): 93.5 (25 Jul 2021 10:35)  BMI (kg/m2): 31.3 (25 Jul 2021 10:35)  BSA (m2): 2.07 (25 Jul 2021 10:35)    CENTRAL LINE: [ ] YES [ ] NO  LOCATION:         AMOR: [ ] YES [ ] NO          A-LINE:  [ ] YES [ ] NO  LOCATION:             ICU Vital Signs Last 24 Hrs  T(C): 36.7 (27 Jul 2021 05:15), Max: 37.2 (26 Jul 2021 12:00)  T(F): 98.1 (27 Jul 2021 05:15), Max: 99 (26 Jul 2021 12:00)  HR: 63 (27 Jul 2021 06:45) (62 - 91)  BP: 101/63 (27 Jul 2021 06:00) (101/55 - 141/57)  BP(mean): 72 (27 Jul 2021 06:00) (64 - 82)  ABP: 101/56 (27 Jul 2021 06:45) (80/69 - 126/67)  ABP(mean): 72 (27 Jul 2021 06:45) (54 - 91)  RR: 16 (27 Jul 2021 06:45) (9 - 19)  SpO2: 95% (27 Jul 2021 06:45) (90% - 95%)      ABG - ( 27 Jul 2021 04:06 )  pH, Arterial: 7.39  pH, Blood: x     /  pCO2: 57    /  pO2: 73    / HCO3: 34    / Base Excess: 7.6   /  SaO2: 95                    07-25 @ 07:01  -  07-26 @ 07:00  --------------------------------------------------------  IN: 1256.7 mL / OUT: 2700 mL / NET: -1443.3 mL        Mode: AC/ CMV (Assist Control/ Continuous Mandatory Ventilation)  RR (machine): 16  TV (machine): 500  FiO2: 50  PEEP: 5  ITime: 1  MAP: 9  PIP: 20      REVIEW OF SYSTEMS:    CONSTITUTIONAL: No weakness, fevers or chills  NECK: No pain or stiffness  RESPIRATORY: No cough, wheezing, hemoptysis; No shortness of breath  CARDIOVASCULAR: No chest pain or palpitations  GASTROINTESTINAL: No abdominal or epigastric pain. No nausea, vomiting, No diarrhea or constipation. No melena or hematochezia.  GENITOURINARY: No dysuria, frequency or hematuria  NEUROLOGICAL: No numbness or weakness  All other review of systems is negative unless indicated above      PHYSICAL EXAM:    GENERAL: NAD, well-groomed, well-developed  EYES: EOMI, PERRLA,   NECK: Supple, No JVD; Normal thyroid; Trachea midline  NERVOUS SYSTEM:  Alert & Oriented X3,  Motor Strength 5/5 B/L upper and lower extremities; DTRs 2+ intact and symmetric  CHEST/LUNG: No rales, rhonchi, wheezing   HEART: Regular rate and rhythm; No murmurs,   ABDOMEN: Soft, Nontender, Nondistended; Bowel sounds present  EXTREMITIES:  2+ Peripheral Pulses, No clubbing, cyanosis, or edema        LABS:  CBC Full  -  ( 27 Jul 2021 03:54 )  WBC Count : 11.71 K/uL  RBC Count : 4.14 M/uL  Hemoglobin : 11.6 g/dL  Hematocrit : 37.5 %  Platelet Count - Automated : 184 K/uL  Mean Cell Volume : 90.6 fl  Mean Cell Hemoglobin : 28.0 pg  Mean Cell Hemoglobin Concentration : 30.9 gm/dL  Auto Neutrophil # : x  Auto Lymphocyte # : x  Auto Monocyte # : x  Auto Eosinophil # : x  Auto Basophil # : x  Auto Neutrophil % : x  Auto Lymphocyte % : x  Auto Monocyte % : x  Auto Eosinophil % : x  Auto Basophil % : x    07-27    135  |  97  |  21<H>  ----------------------------<  239<H>  4.7   |  33<H>  |  0.52    Ca    8.1<L>      27 Jul 2021 03:54  Phos  3.5     07-27  Mg     2.2     07-27    TPro  5.5<L>  /  Alb  2.5<L>  /  TBili  0.3  /  DBili  x   /  AST  7<L>  /  ALT  25  /  AlkPhos  56  07-27            RADIOLOGY & ADDITIONAL STUDIES REVIEWED:  ***    [ ]GOALS OF CARE DISCUSSION WITH PATIENT/FAMILY/PROXY:    CRITICAL CARE TIME SPENT: 35 minutes INTERVAL HPI/OVERNIGHT EVENTS: NAOE    PRESSORS: [x ] YES [ ] NO  WHICH: Levo      Antimicrobial:    Cardiovascular:  norepinephrine Infusion 0.05 MICROgram(s)/kG/Min IV Continuous <Continuous>  phenylephrine    Infusion 2 MICROgram(s)/kG/Min IV Continuous <Continuous>    Pulmonary:  ALBUTerol    90 MICROgram(s) HFA Inhaler 2 Puff(s) Inhalation every 6 hours PRN  tiotropium 18 MICROgram(s) Capsule 1 Capsule(s) Inhalation daily    Hematalogic:  aspirin  chewable 81 milliGRAM(s) Oral daily  enoxaparin Injectable 40 milliGRAM(s) SubCutaneous daily    Other:  artificial  tears Solution 1 Drop(s) Both EYES three times a day  chlorhexidine 0.12% Liquid 15 milliLiter(s) Oral Mucosa every 12 hours  chlorhexidine 4% Liquid 1 Application(s) Topical <User Schedule>  dextrose 50% Injectable 12.5 Gram(s) IV Push once  fentaNYL   Infusion. 0.5 MICROgram(s)/kG/Hr IV Continuous <Continuous>  insulin lispro (ADMELOG) corrective regimen sliding scale   SubCutaneous every 6 hours  methylPREDNISolone sodium succinate Injectable 40 milliGRAM(s) IV Push three times a day  pantoprazole  Injectable 40 milliGRAM(s) IV Push daily  polyethylene glycol 3350 17 Gram(s) Oral daily  propofol Infusion 10 MICROgram(s)/kG/Min IV Continuous <Continuous>  risperiDONE   Tablet 1 milliGRAM(s) Oral daily  senna 2 Tablet(s) Oral at bedtime  sodium chloride 0.9% lock flush 10 milliLiter(s) IV Push every 1 hour PRN  traZODone 150 milliGRAM(s) Oral at bedtime    ALBUTerol    90 MICROgram(s) HFA Inhaler 2 Puff(s) Inhalation every 6 hours PRN  artificial  tears Solution 1 Drop(s) Both EYES three times a day  aspirin  chewable 81 milliGRAM(s) Oral daily  chlorhexidine 0.12% Liquid 15 milliLiter(s) Oral Mucosa every 12 hours  chlorhexidine 4% Liquid 1 Application(s) Topical <User Schedule>  dextrose 50% Injectable 12.5 Gram(s) IV Push once  enoxaparin Injectable 40 milliGRAM(s) SubCutaneous daily  fentaNYL   Infusion. 0.5 MICROgram(s)/kG/Hr IV Continuous <Continuous>  insulin lispro (ADMELOG) corrective regimen sliding scale   SubCutaneous every 6 hours  methylPREDNISolone sodium succinate Injectable 40 milliGRAM(s) IV Push three times a day  norepinephrine Infusion 0.05 MICROgram(s)/kG/Min IV Continuous <Continuous>  pantoprazole  Injectable 40 milliGRAM(s) IV Push daily  phenylephrine    Infusion 2 MICROgram(s)/kG/Min IV Continuous <Continuous>  polyethylene glycol 3350 17 Gram(s) Oral daily  propofol Infusion 10 MICROgram(s)/kG/Min IV Continuous <Continuous>  risperiDONE   Tablet 1 milliGRAM(s) Oral daily  senna 2 Tablet(s) Oral at bedtime  sodium chloride 0.9% lock flush 10 milliLiter(s) IV Push every 1 hour PRN  tiotropium 18 MICROgram(s) Capsule 1 Capsule(s) Inhalation daily  traZODone 150 milliGRAM(s) Oral at bedtime    Drug Dosing Weight  Height (cm): 172.7 (25 Jul 2021 07:05)  Weight (kg): 93.5 (25 Jul 2021 10:35)  BMI (kg/m2): 31.3 (25 Jul 2021 10:35)  BSA (m2): 2.07 (25 Jul 2021 10:35)    CENTRAL LINE: [x ] YES [ ] NO  LOCATION:   [facility] 7/25      AMOR: [x ] YES [ ] NO          A-LINE:  [x ] YES [ ] NO  LOCATION:   [facility] 7/26          ICU Vital Signs Last 24 Hrs  T(C): 36.7 (27 Jul 2021 05:15), Max: 37.2 (26 Jul 2021 12:00)  T(F): 98.1 (27 Jul 2021 05:15), Max: 99 (26 Jul 2021 12:00)  HR: 63 (27 Jul 2021 06:45) (62 - 91)  BP: 101/63 (27 Jul 2021 06:00) (101/55 - 141/57)  BP(mean): 72 (27 Jul 2021 06:00) (64 - 82)  ABP: 101/56 (27 Jul 2021 06:45) (80/69 - 126/67)  ABP(mean): 72 (27 Jul 2021 06:45) (54 - 91)  RR: 16 (27 Jul 2021 06:45) (9 - 19)  SpO2: 95% (27 Jul 2021 06:45) (90% - 95%)      ABG - ( 27 Jul 2021 04:06 )  pH, Arterial: 7.39  pH, Blood: x     /  pCO2: 57    /  pO2: 73    / HCO3: 34    / Base Excess: 7.6   /  SaO2: 95                    07-25 @ 07:01  -  07-26 @ 07:00  --------------------------------------------------------  IN: 1256.7 mL / OUT: 2700 mL / NET: -1443.3 mL        Mode: AC/ CMV (Assist Control/ Continuous Mandatory Ventilation)  RR (machine): 16  TV (machine): 500  FiO2: 50  PEEP: 5  ITime: 1  MAP: 9  PIP: 20      PHYSICAL EXAM:    GENERAL: NAD, patient sedated and intubated. Increased AP   EYES: EOMI, PERRLA,   NECK: Supple, No JVD; Normal thyroid; Trachea midline  NERVOUS SYSTEM:  Patient sedated, opens eyes to sternal rub  CHEST/LUNG: No rales, rhonchi, wheezing.  HEART: Regular rate and rhythm; No murmurs,   ABDOMEN: Soft, Nontender, Nondistended; Bowel sounds present  EXTREMITIES:  No clubbing, cyanosis, or edema        LABS:  CBC Full  -  ( 27 Jul 2021 03:54 )  WBC Count : 11.71 K/uL  RBC Count : 4.14 M/uL  Hemoglobin : 11.6 g/dL  Hematocrit : 37.5 %  Platelet Count - Automated : 184 K/uL  Mean Cell Volume : 90.6 fl  Mean Cell Hemoglobin : 28.0 pg  Mean Cell Hemoglobin Concentration : 30.9 gm/dL  Auto Neutrophil # : x  Auto Lymphocyte # : x  Auto Monocyte # : x  Auto Eosinophil # : x  Auto Basophil # : x  Auto Neutrophil % : x  Auto Lymphocyte % : x  Auto Monocyte % : x  Auto Eosinophil % : x  Auto Basophil % : x    07-27    135  |  97  |  21<H>  ----------------------------<  239<H>  4.7   |  33<H>  |  0.52    Ca    8.1<L>      27 Jul 2021 03:54  Phos  3.5     07-27  Mg     2.2     07-27    TPro  5.5<L>  /  Alb  2.5<L>  /  TBili  0.3  /  DBili  x   /  AST  7<L>  /  ALT  25  /  AlkPhos  56  07-27            RADIOLOGY & ADDITIONAL STUDIES REVIEWED:    < from: Xray Chest 1 View- PORTABLE-Routine (Xray Chest 1 View- PORTABLE-Routine in AM.) (07.26.21 @ 09:33) >  FOLLOW-UP AP PORTABLE CHEST RADIOGRAPH 7/25/2021 AT 5:22 PM:  ET tube tip above tracheal bifurcation.  NG tube tip beyond GE junction.  RIGHT IJ catheter tip in SVC.  The  heart is enlarged in transverse diameter..  No gross airspace disease, effusion or pneumothorax.      FOLLOW-UP AP PORTABLE CHEST RADIOGRAPH 7/26/2021 AT 8:49 AM  No interval change    < end of copied text >      [ ]GOALS OF CARE DISCUSSION WITH PATIENT/FAMILY/PROXY:    CRITICAL CARE TIME SPENT: 35 minutes

## 2021-07-28 LAB
ALBUMIN SERPL ELPH-MCNC: 2.6 G/DL — LOW (ref 3.5–5)
ALP SERPL-CCNC: 55 U/L — SIGNIFICANT CHANGE UP (ref 40–120)
ALT FLD-CCNC: 20 U/L DA — SIGNIFICANT CHANGE UP (ref 10–60)
ANION GAP SERPL CALC-SCNC: 2 MMOL/L — LOW (ref 5–17)
AST SERPL-CCNC: 7 U/L — LOW (ref 10–40)
BASE EXCESS BLDA CALC-SCNC: 8 MMOL/L — HIGH (ref -2–3)
BILIRUB SERPL-MCNC: 0.4 MG/DL — SIGNIFICANT CHANGE UP (ref 0.2–1.2)
BLOOD GAS COMMENTS ARTERIAL: SIGNIFICANT CHANGE UP
BUN SERPL-MCNC: 25 MG/DL — HIGH (ref 7–18)
CALCIUM SERPL-MCNC: 8.4 MG/DL — SIGNIFICANT CHANGE UP (ref 8.4–10.5)
CHLORIDE SERPL-SCNC: 98 MMOL/L — SIGNIFICANT CHANGE UP (ref 96–108)
CK SERPL-CCNC: 19 U/L — LOW (ref 35–232)
CO2 SERPL-SCNC: 34 MMOL/L — HIGH (ref 22–31)
CREAT SERPL-MCNC: 0.51 MG/DL — SIGNIFICANT CHANGE UP (ref 0.5–1.3)
GLUCOSE BLDC GLUCOMTR-MCNC: 198 MG/DL — HIGH (ref 70–99)
GLUCOSE BLDC GLUCOMTR-MCNC: 242 MG/DL — HIGH (ref 70–99)
GLUCOSE BLDC GLUCOMTR-MCNC: 248 MG/DL — HIGH (ref 70–99)
GLUCOSE BLDC GLUCOMTR-MCNC: 264 MG/DL — HIGH (ref 70–99)
GLUCOSE SERPL-MCNC: 272 MG/DL — HIGH (ref 70–99)
HCO3 BLDA-SCNC: 34 MMOL/L — HIGH (ref 21–28)
HCT VFR BLD CALC: 40.3 % — SIGNIFICANT CHANGE UP (ref 39–50)
HGB BLD-MCNC: 12.7 G/DL — LOW (ref 13–17)
HOROWITZ INDEX BLDA+IHG-RTO: 50 — SIGNIFICANT CHANGE UP
MAGNESIUM SERPL-MCNC: 2.2 MG/DL — SIGNIFICANT CHANGE UP (ref 1.6–2.6)
MCHC RBC-ENTMCNC: 28 PG — SIGNIFICANT CHANGE UP (ref 27–34)
MCHC RBC-ENTMCNC: 31.5 GM/DL — LOW (ref 32–36)
MCV RBC AUTO: 89 FL — SIGNIFICANT CHANGE UP (ref 80–100)
NRBC # BLD: 0 /100 WBCS — SIGNIFICANT CHANGE UP (ref 0–0)
PCO2 BLDA: 51 MMHG — HIGH (ref 35–48)
PH BLDA: 7.43 — SIGNIFICANT CHANGE UP (ref 7.35–7.45)
PHOSPHATE SERPL-MCNC: 3.5 MG/DL — SIGNIFICANT CHANGE UP (ref 2.5–4.5)
PLATELET # BLD AUTO: 176 K/UL — SIGNIFICANT CHANGE UP (ref 150–400)
PO2 BLDA: 67 MMHG — LOW (ref 83–108)
POTASSIUM SERPL-MCNC: 4.9 MMOL/L — SIGNIFICANT CHANGE UP (ref 3.5–5.3)
POTASSIUM SERPL-SCNC: 4.9 MMOL/L — SIGNIFICANT CHANGE UP (ref 3.5–5.3)
PROT SERPL-MCNC: 5.7 G/DL — LOW (ref 6–8.3)
RBC # BLD: 4.53 M/UL — SIGNIFICANT CHANGE UP (ref 4.2–5.8)
RBC # FLD: 13.6 % — SIGNIFICANT CHANGE UP (ref 10.3–14.5)
SAO2 % BLDA: 94 % — SIGNIFICANT CHANGE UP
SODIUM SERPL-SCNC: 134 MMOL/L — LOW (ref 135–145)
TROPONIN I SERPL-MCNC: 0.1 NG/ML — HIGH (ref 0–0.04)
WBC # BLD: 8.68 K/UL — SIGNIFICANT CHANGE UP (ref 3.8–10.5)
WBC # FLD AUTO: 8.68 K/UL — SIGNIFICANT CHANGE UP (ref 3.8–10.5)

## 2021-07-28 PROCEDURE — 71045 X-RAY EXAM CHEST 1 VIEW: CPT | Mod: 26

## 2021-07-28 RX ORDER — INSULIN GLARGINE 100 [IU]/ML
10 INJECTION, SOLUTION SUBCUTANEOUS AT BEDTIME
Refills: 0 | Status: DISCONTINUED | OUTPATIENT
Start: 2021-07-28 | End: 2021-08-02

## 2021-07-28 RX ADMIN — Medication 1 DROP(S): at 06:40

## 2021-07-28 RX ADMIN — DEXMEDETOMIDINE HYDROCHLORIDE IN 0.9% SODIUM CHLORIDE 11.7 MICROGRAM(S)/KG/HR: 4 INJECTION INTRAVENOUS at 07:37

## 2021-07-28 RX ADMIN — CHLORHEXIDINE GLUCONATE 1 APPLICATION(S): 213 SOLUTION TOPICAL at 06:40

## 2021-07-28 RX ADMIN — Medication 40 MILLIGRAM(S): at 06:39

## 2021-07-28 RX ADMIN — Medication 1 PUFF(S): at 23:12

## 2021-07-28 RX ADMIN — RISPERIDONE 1 MILLIGRAM(S): 4 TABLET ORAL at 12:09

## 2021-07-28 RX ADMIN — ALBUTEROL 2 PUFF(S): 90 AEROSOL, METERED ORAL at 09:28

## 2021-07-28 RX ADMIN — ALBUTEROL 2 PUFF(S): 90 AEROSOL, METERED ORAL at 20:40

## 2021-07-28 RX ADMIN — CEFEPIME 100 MILLIGRAM(S): 1 INJECTION, POWDER, FOR SOLUTION INTRAMUSCULAR; INTRAVENOUS at 06:00

## 2021-07-28 RX ADMIN — CEFEPIME 100 MILLIGRAM(S): 1 INJECTION, POWDER, FOR SOLUTION INTRAMUSCULAR; INTRAVENOUS at 18:00

## 2021-07-28 RX ADMIN — Medication 40 MILLIGRAM(S): at 14:00

## 2021-07-28 RX ADMIN — Medication 1 DROP(S): at 21:40

## 2021-07-28 RX ADMIN — ALBUTEROL 2 PUFF(S): 90 AEROSOL, METERED ORAL at 03:47

## 2021-07-28 RX ADMIN — Medication 1 DROP(S): at 14:00

## 2021-07-28 RX ADMIN — Medication 1 PUFF(S): at 15:13

## 2021-07-28 RX ADMIN — Medication 1 PUFF(S): at 09:28

## 2021-07-28 RX ADMIN — INSULIN GLARGINE 10 UNIT(S): 100 INJECTION, SOLUTION SUBCUTANEOUS at 23:11

## 2021-07-28 RX ADMIN — ENOXAPARIN SODIUM 40 MILLIGRAM(S): 100 INJECTION SUBCUTANEOUS at 12:10

## 2021-07-28 RX ADMIN — Medication 150 MILLIGRAM(S): at 21:41

## 2021-07-28 RX ADMIN — Medication 3: at 18:00

## 2021-07-28 RX ADMIN — Medication 1 PUFF(S): at 03:47

## 2021-07-28 RX ADMIN — POLYETHYLENE GLYCOL 3350 17 GRAM(S): 17 POWDER, FOR SOLUTION ORAL at 12:09

## 2021-07-28 RX ADMIN — Medication 1: at 23:11

## 2021-07-28 RX ADMIN — PANTOPRAZOLE SODIUM 40 MILLIGRAM(S): 20 TABLET, DELAYED RELEASE ORAL at 12:09

## 2021-07-28 RX ADMIN — ALBUTEROL 2 PUFF(S): 90 AEROSOL, METERED ORAL at 15:12

## 2021-07-28 RX ADMIN — Medication 40 MILLIGRAM(S): at 21:41

## 2021-07-28 RX ADMIN — Medication 81 MILLIGRAM(S): at 12:09

## 2021-07-28 RX ADMIN — Medication 2: at 12:12

## 2021-07-28 RX ADMIN — Medication 2: at 06:00

## 2021-07-28 RX ADMIN — CHLORHEXIDINE GLUCONATE 15 MILLILITER(S): 213 SOLUTION TOPICAL at 06:40

## 2021-07-28 RX ADMIN — CEFEPIME 100 MILLIGRAM(S): 1 INJECTION, POWDER, FOR SOLUTION INTRAMUSCULAR; INTRAVENOUS at 07:37

## 2021-07-28 RX ADMIN — PROPOFOL 5.44 MICROGRAM(S)/KG/MIN: 10 INJECTION, EMULSION INTRAVENOUS at 01:14

## 2021-07-28 RX ADMIN — SENNA PLUS 2 TABLET(S): 8.6 TABLET ORAL at 21:41

## 2021-07-28 NOTE — PROGRESS NOTE ADULT - SUBJECTIVE AND OBJECTIVE BOX
HPI:  61 yo M with PMH including HTN, HLD, DM, CHF, CAD (w stents), COPD on 2.5L home 02 and night BIPAP (12/5 35%) presents from [facility] for shortness of breath. When EMS arrived pt was noted to be in respiratory distress and his portable O2 machine was uncharged and not running. Pt endorsed to ED physician his SOB has worsened overnight after being discharged 1 day PTA. Off note Pt was here last night for left ankle pain, He was diagnosed with non-displaced left lateral malleolus fracture. Pt was noted to be tachycardic and sob but refused admission and discharged with cast. Pt was Ax0 x3 in ED but Upon my examination pt was lethargic and unable to answer any questions.  (25 Jul 2021 09:56)      ICU Vital Signs Last 24 Hrs  T(C): 36.3 (28 Jul 2021 16:00), Max: 37.3 (27 Jul 2021 23:00)  T(F): 97.3 (28 Jul 2021 16:00), Max: 99.1 (27 Jul 2021 23:00)  HR: 84 (28 Jul 2021 19:00) (58 - 89)  BP: 99/51 (28 Jul 2021 18:00) (90/41 - 141/82)  BP(mean): 61 (28 Jul 2021 18:00) (53 - 99)  ABP: 113/55 (28 Jul 2021 19:00) (91/48 - 143/74)  ABP(mean): 71 (28 Jul 2021 19:00) (62 - 117)  RR: 27 (28 Jul 2021 19:00) (19 - 32)  SpO2: 96% (28 Jul 2021 19:00) (89% - 99%)      Vital Signs Last 24 Hrs  T(C): 36.3 (28 Jul 2021 16:00), Max: 37.3 (27 Jul 2021 23:00)  T(F): 97.3 (28 Jul 2021 16:00), Max: 99.1 (27 Jul 2021 23:00)  HR: 84 (28 Jul 2021 19:00) (58 - 89)  BP: 99/51 (28 Jul 2021 18:00) (90/41 - 141/82)  BP(mean): 61 (28 Jul 2021 18:00) (53 - 99)  RR: 27 (28 Jul 2021 19:00) (19 - 32)  SpO2: 96% (28 Jul 2021 19:00) (89% - 99%)                        12.7   8.68  )-----------( 176      ( 28 Jul 2021 06:32 )             40.3     07-28    134<L>  |  98  |  25<H>  ----------------------------<  272<H>  4.9   |  34<H>  |  0.51    Ca    8.4      28 Jul 2021 06:32  Phos  3.5     07-28  Mg     2.2     07-28    TPro  5.7<L>  /  Alb  2.6<L>  /  TBili  0.4  /  DBili  x   /  AST  7<L>  /  ALT  20  /  AlkPhos  55  07-28        CAPILLARY BLOOD GLUCOSE      POCT Blood Glucose.: 264 mg/dL (28 Jul 2021 17:14)  POCT Blood Glucose.: 248 mg/dL (28 Jul 2021 11:43)  POCT Blood Glucose.: 242 mg/dL (28 Jul 2021 05:31)  POCT Blood Glucose.: 252 mg/dL (27 Jul 2021 23:13)      MEDICATIONS  (STANDING):  ALBUTerol    90 MICROgram(s) HFA Inhaler 2 Puff(s) Inhalation every 6 hours  artificial  tears Solution 1 Drop(s) Both EYES three times a day  aspirin  chewable 81 milliGRAM(s) Oral daily  cefepime   IVPB 2000 milliGRAM(s) IV Intermittent every 12 hours  cefepime   IVPB      chlorhexidine 2% Cloths 1 Application(s) Topical <User Schedule>  dextrose 50% Injectable 12.5 Gram(s) IV Push once  enoxaparin Injectable 40 milliGRAM(s) SubCutaneous daily  insulin glargine Injectable (LANTUS) 10 Unit(s) SubCutaneous at bedtime  insulin lispro (ADMELOG) corrective regimen sliding scale   SubCutaneous every 6 hours  ipratropium 17 MICROgram(s) HFA Inhaler 1 Puff(s) Inhalation every 6 hours  methylPREDNISolone sodium succinate Injectable 40 milliGRAM(s) IV Push three times a day  pantoprazole  Injectable 40 milliGRAM(s) IV Push daily  polyethylene glycol 3350 17 Gram(s) Oral daily  risperiDONE   Tablet 1 milliGRAM(s) Oral daily  senna 2 Tablet(s) Oral at bedtime  traZODone 150 milliGRAM(s) Oral at bedtime    MEDICATIONS  (PRN):  sodium chloride 0.9% lock flush 10 milliLiter(s) IV Push every 1 hour PRN Pre/post blood products, medications, blood draw, and to maintain line patency    Allergies    No Known Allergies    Intolerances      PAST MEDICAL & SURGICAL HISTORY:  COPD (chronic obstructive pulmonary disease)    Stented coronary artery    HLD (hyperlipidemia)    Pulmonary HTN    Type 2 diabetes mellitus without complication, with long-term current use of insulin    Coronary artery disease involving native coronary artery of native heart without angina pectoris    Bipolar 1 disorder    Smoker    Gastroesophageal reflux disease, esophagitis presence not specified    Oxygen dependent    COPD (chronic obstructive pulmonary disease)    DM (diabetes mellitus)    CAD (coronary artery disease)    GERD (gastroesophageal reflux disease)    HLD (hyperlipidemia)    Insomnia    Polyarthritis    No significant past surgical history      Social History:  Alcohol: unable to obtain due to mental status  Smoking: yes , pack of cigs in pockets (25 Jul 2021 09:56)     HPI:  59 yo M with PMH including HTN, HLD, DM, CHF, CAD (w stents), COPD on 2.5L home 02 and night BIPAP (12/5 35%) presents from [facility] for shortness of breath. When EMS arrived pt was noted to be in respiratory distress and his portable O2 machine was uncharged and not running. Pt endorsed to ED physician his SOB has worsened overnight after being discharged 1 day PTA. Off note Pt was here last night for left ankle pain, He was diagnosed with non-displaced left lateral malleolus fracture. Pt was noted to be tachycardic and sob but refused admission and discharged with cast. Pt was Ax0 x3 in ED but Upon my examination pt was lethargic and unable to answer any questions.  (25 Jul 2021 09:56)      ICU Vital Signs Last 24 Hrs  T(C): 36.3 (28 Jul 2021 16:00), Max: 37.3 (27 Jul 2021 23:00)  T(F): 97.3 (28 Jul 2021 16:00), Max: 99.1 (27 Jul 2021 23:00)  HR: 84 (28 Jul 2021 19:00) (58 - 89)  BP: 99/51 (28 Jul 2021 18:00) (90/41 - 141/82)  BP(mean): 61 (28 Jul 2021 18:00) (53 - 99)  ABP: 113/55 (28 Jul 2021 19:00) (91/48 - 143/74)  ABP(mean): 71 (28 Jul 2021 19:00) (62 - 117)  RR: 27 (28 Jul 2021 19:00) (19 - 32)  SpO2: 96% (28 Jul 2021 19:00) (89% - 99%)      Vital Signs Last 24 Hrs  T(C): 36.3 (28 Jul 2021 16:00), Max: 37.3 (27 Jul 2021 23:00)  T(F): 97.3 (28 Jul 2021 16:00), Max: 99.1 (27 Jul 2021 23:00)  HR: 84 (28 Jul 2021 19:00) (58 - 89)  BP: 99/51 (28 Jul 2021 18:00) (90/41 - 141/82)  BP(mean): 61 (28 Jul 2021 18:00) (53 - 99)  RR: 27 (28 Jul 2021 19:00) (19 - 32)  SpO2: 96% (28 Jul 2021 19:00) (89% - 99%)                        12.7   8.68  )-----------( 176      ( 28 Jul 2021 06:32 )             40.3     07-28    134<L>  |  98  |  25<H>  ----------------------------<  272<H>  4.9   |  34<H>  |  0.51    Ca    8.4      28 Jul 2021 06:32  Phos  3.5     07-28  Mg     2.2     07-28    TPro  5.7<L>  /  Alb  2.6<L>  /  TBili  0.4  /  DBili  x   /  AST  7<L>  /  ALT  20  /  AlkPhos  55  07-28        CAPILLARY BLOOD GLUCOSE      POCT Blood Glucose.: 264 mg/dL (28 Jul 2021 17:14)  POCT Blood Glucose.: 248 mg/dL (28 Jul 2021 11:43)  POCT Blood Glucose.: 242 mg/dL (28 Jul 2021 05:31)  POCT Blood Glucose.: 252 mg/dL (27 Jul 2021 23:13)      MEDICATIONS  (STANDING):  ALBUTerol    90 MICROgram(s) HFA Inhaler 2 Puff(s) Inhalation every 6 hours  artificial  tears Solution 1 Drop(s) Both EYES three times a day  aspirin  chewable 81 milliGRAM(s) Oral daily  cefepime   IVPB 2000 milliGRAM(s) IV Intermittent every 12 hours  cefepime   IVPB      chlorhexidine 2% Cloths 1 Application(s) Topical <User Schedule>  dextrose 50% Injectable 12.5 Gram(s) IV Push once  enoxaparin Injectable 40 milliGRAM(s) SubCutaneous daily  insulin glargine Injectable (LANTUS) 10 Unit(s) SubCutaneous at bedtime  insulin lispro (ADMELOG) corrective regimen sliding scale   SubCutaneous every 6 hours  ipratropium 17 MICROgram(s) HFA Inhaler 1 Puff(s) Inhalation every 6 hours  methylPREDNISolone sodium succinate Injectable 40 milliGRAM(s) IV Push three times a day  pantoprazole  Injectable 40 milliGRAM(s) IV Push daily  polyethylene glycol 3350 17 Gram(s) Oral daily  risperiDONE   Tablet 1 milliGRAM(s) Oral daily  senna 2 Tablet(s) Oral at bedtime  traZODone 150 milliGRAM(s) Oral at bedtime    MEDICATIONS  (PRN):  sodium chloride 0.9% lock flush 10 milliLiter(s) IV Push every 1 hour PRN Pre/post blood products, medications, blood draw, and to maintain line patency    Allergies    No Known Allergies    Intolerances      PAST MEDICAL & SURGICAL HISTORY:  COPD (chronic obstructive pulmonary disease)    Stented coronary artery    HLD (hyperlipidemia)    Pulmonary HTN    Type 2 diabetes mellitus without complication, with long-term current use of insulin    Coronary artery disease involving native coronary artery of native heart without angina pectoris    Bipolar 1 disorder    Smoker    Gastroesophageal reflux disease, esophagitis presence not specified    Oxygen dependent    COPD (chronic obstructive pulmonary disease)    DM (diabetes mellitus)    CAD (coronary artery disease)    GERD (gastroesophageal reflux disease)    HLD (hyperlipidemia)    Insomnia    Polyarthritis    No significant past surgical history      Social History:  Alcohol: unable to obtain due to mental status  Smoking: yes , pack of cigs in pockets (25 Jul 2021 09:56)    pt seen in ICU     exam focused lower extremities   :  Vascular :   DP -palpable   TG -WNL   Neuro : n/a  MS:   left ankle tender to palpation in the area of the lateral mal   Derm:  no bruising   no  erythema   no open skin lesions   no fracture blisters     < from: Xray Ankle Complete 3 Views, Left (07.24.21 @ 19:27) >    EXAM:  XR ANKLE COMP MIN 3 VIEWS LT                            PROCEDURE DATE:  07/24/2021          INTERPRETATION:  History: Fall, ankle pain.    FINDINGS: Frontal, lateral and oblique projections of the left ankle.    Diffuse soft tissue swelling. Osteopenia. No fracture or dislocation. Ankle mortise is aligned. Extensive vascular calcification.    IMPRESSION:    No fracture or dislocation left ankle appreciated.    --- End of Report ---            KRISHNA COFFMAN MD; Attending Radiologist  This document has been electronically signed. Jul 25 2021  4:39PM    < end of copied text >

## 2021-07-28 NOTE — CHART NOTE - NSCHARTNOTEFT_GEN_A_CORE
Assessment:   Patient is a 60y old  Male who presents with a chief complaint of AHHRF (2021 12:31). Pt seen earlier, s/p extubation from vent, with O2 mask. Pt NPO, now day #5 (Pt was on Propofol 22 ml/hr, not in progress or currently noted (pt s/p extubation)          Diet Prescription: Diet, NPO (21 @ 14:18)        Daily     Daily Weight in k.9 (2021 07:00)  Weight in k.7 (2021 07:45)  Weight in k.5 (2021 07:30)    % Weight Change: 1+ generalized edema,O>I     Pertinent Medications: MEDICATIONS  (STANDING):  ALBUTerol    90 MICROgram(s) HFA Inhaler 2 Puff(s) Inhalation every 6 hours  artificial  tears Solution 1 Drop(s) Both EYES three times a day  aspirin  chewable 81 milliGRAM(s) Oral daily  cefepime   IVPB 2000 milliGRAM(s) IV Intermittent every 12 hours  cefepime   IVPB      chlorhexidine 2% Cloths 1 Application(s) Topical <User Schedule>  dextrose 50% Injectable 12.5 Gram(s) IV Push once  enoxaparin Injectable 40 milliGRAM(s) SubCutaneous daily  insulin glargine Injectable (LANTUS) 10 Unit(s) SubCutaneous at bedtime  insulin lispro (ADMELOG) corrective regimen sliding scale   SubCutaneous every 6 hours  ipratropium 17 MICROgram(s) HFA Inhaler 1 Puff(s) Inhalation every 6 hours  methylPREDNISolone sodium succinate Injectable 40 milliGRAM(s) IV Push three times a day  pantoprazole  Injectable 40 milliGRAM(s) IV Push daily  polyethylene glycol 3350 17 Gram(s) Oral daily  risperiDONE   Tablet 1 milliGRAM(s) Oral daily  senna 2 Tablet(s) Oral at bedtime  traZODone 150 milliGRAM(s) Oral at bedtime    MEDICATIONS  (PRN):  sodium chloride 0.9% lock flush 10 milliLiter(s) IV Push every 1 hour PRN Pre/post blood products, medications, blood draw, and to maintain line patency    Pertinent Labs:  Na134 mmol/L<L> Glu 272 mg/dL<H> K+ 4.9 mmol/L Cr  0.51 mg/dL BUN 25 mg/dL<H>  Phos 3.5 mg/dL  Alb 2.6 g/dL<L>  Chol 154 mg/dL LDL --    HDL 25 mg/dL<L> Trig 136 mg/dL     CAPILLARY BLOOD GLUCOSE      POCT Blood Glucose.: 264 mg/dL (2021 17:14)  POCT Blood Glucose.: 248 mg/dL (2021 11:43)  POCT Blood Glucose.: 242 mg/dL (2021 05:31)  POCT Blood Glucose.: 252 mg/dL (2021 23:13)        Estimated Needs:   [x ] no change since previous assessment  [ ] recalculated:       Previous Nutrition Diagnosis:   [ ] Altered GI function  [x ]Inadequate Oral Intake [ ] Swallowing Difficulty   [ ] Altered nutrition related labs [ ] Increased Nutrient Needs [ ] Overweight/Obesity   [ ] Unintended Weight Loss [ ] Food & Nutrition Related Knowledge Deficit [ ] Malnutrition   [ ] Other:     Nutrition Diagnosis is [x ] ongoing  [ ] resolved [ ] not applicable     New Nutrition Diagnosis: [x ] PCM (moderate) related to acute illness as evidenced by <50% needs> 5 days, 1+ generalized edema.    Interventions:   Recommend  [ ] Change Diet To:  [ ] Nutrition Supplement  [x ] Nutrition Support: If TF (w/o Propofol: Glucerna 1.5 45x24).   [x ] Other: SLP before PO. MD to monitor. RD available.     Monitoring and Evaluation:   [x ] PO intake [ x ] Tolerance to diet prescription [ x ] weights [ x ] labs[ x ] follow up per protocol  [ ] other: Assessment:   Patient is a 60y old  Male who presents with a chief complaint of AHHRF (2021 12:31). Pt seen earlier, s/p extubation from vent, with O2 mask. Pt NPO, now day #5 (Pt was on Propofol 22 ml/hr, not in progress or currently noted (pt s/p extubation))          Diet Prescription: Diet, NPO (21 @ 14:18)        Daily     Daily Weight in k.9 (2021 07:00)  Weight in k.7 (2021 07:45)  Weight in k.5 (2021 07:30)    % Weight Change: 1+ generalized edema,O>I     Pertinent Medications: MEDICATIONS  (STANDING):  ALBUTerol    90 MICROgram(s) HFA Inhaler 2 Puff(s) Inhalation every 6 hours  artificial  tears Solution 1 Drop(s) Both EYES three times a day  aspirin  chewable 81 milliGRAM(s) Oral daily  cefepime   IVPB 2000 milliGRAM(s) IV Intermittent every 12 hours  cefepime   IVPB      chlorhexidine 2% Cloths 1 Application(s) Topical <User Schedule>  dextrose 50% Injectable 12.5 Gram(s) IV Push once  enoxaparin Injectable 40 milliGRAM(s) SubCutaneous daily  insulin glargine Injectable (LANTUS) 10 Unit(s) SubCutaneous at bedtime  insulin lispro (ADMELOG) corrective regimen sliding scale   SubCutaneous every 6 hours  ipratropium 17 MICROgram(s) HFA Inhaler 1 Puff(s) Inhalation every 6 hours  methylPREDNISolone sodium succinate Injectable 40 milliGRAM(s) IV Push three times a day  pantoprazole  Injectable 40 milliGRAM(s) IV Push daily  polyethylene glycol 3350 17 Gram(s) Oral daily  risperiDONE   Tablet 1 milliGRAM(s) Oral daily  senna 2 Tablet(s) Oral at bedtime  traZODone 150 milliGRAM(s) Oral at bedtime    MEDICATIONS  (PRN):  sodium chloride 0.9% lock flush 10 milliLiter(s) IV Push every 1 hour PRN Pre/post blood products, medications, blood draw, and to maintain line patency    Pertinent Labs:  Na134 mmol/L<L> Glu 272 mg/dL<H> K+ 4.9 mmol/L Cr  0.51 mg/dL BUN 25 mg/dL<H>  Phos 3.5 mg/dL  Alb 2.6 g/dL<L>  Chol 154 mg/dL LDL --    HDL 25 mg/dL<L> Trig 136 mg/dL     CAPILLARY BLOOD GLUCOSE      POCT Blood Glucose.: 264 mg/dL (2021 17:14)  POCT Blood Glucose.: 248 mg/dL (2021 11:43)  POCT Blood Glucose.: 242 mg/dL (2021 05:31)  POCT Blood Glucose.: 252 mg/dL (2021 23:13)        Estimated Needs:   [x ] no change since previous assessment  [ ] recalculated:       Previous Nutrition Diagnosis:   [ ] Altered GI function  [x ]Inadequate Oral Intake [ ] Swallowing Difficulty   [ ] Altered nutrition related labs [ ] Increased Nutrient Needs [ ] Overweight/Obesity   [ ] Unintended Weight Loss [ ] Food & Nutrition Related Knowledge Deficit [ ] Malnutrition   [ ] Other:     Nutrition Diagnosis is [x ] ongoing  [ ] resolved [ ] not applicable     New Nutrition Diagnosis: [x ] PCM (moderate) related to acute illness as evidenced by <50% needs> 5 days, 1+ generalized edema.    Interventions:   Recommend  [ ] Change Diet To:  [ ] Nutrition Supplement  [x ] Nutrition Support: If TF (w/o Propofol: Glucerna 1.5 40x24): 960 ml, 1440 kcals, 79 gm protein. MD to monitor. RD available.   [x ] Other: SLP before PO. MD to monitor. RD available.     Monitoring and Evaluation:   [x ] PO intake [ x ] Tolerance to diet prescription [ x ] weights [ x ] labs[ x ] follow up per protocol  [ ] other:

## 2021-07-28 NOTE — PROGRESS NOTE ADULT - ASSESSMENT
59 yo M with PMH including HTN, HLD, DM, CHF, CAD (w stents), COPD on 2.5L home 02 and night BIPAP (/ 35%) presents from [facility] for shortness of breath. ICU was consulted and admitted pt for Acute on chronic hypercapnic respiratory failure requiring intubation       Neuro:  Intubated and sedated w/ prop and fentanyl.  Opens eyes to sternal rub  d/c fentanyl, d/t propofol  Begin awakening trials       Cardiovascular:  Patient has h/o CHF, on lasix at home  Does not look fluid overloaded, not in CHF exacerbation  will hold Lasix for now       Pulmonary:   Acute on chronic Hypoxic, Hypercapnic Respiratory failure   ETT   vent: 16|500|50|5  7.39|57|73|34  hx of multiple exacerbations, >2 in past year, class D COPD   CXR negative for infiltrates  c/w IV solumedrol 40 q8h, de-escalate as needed   will hold off on abx  CXR and ABG qd  Come off sedation --> SBTs    Infectious Diseases:  no active issues     Gastrointestinal:  TF    Renal:  no active issues     Endo:   Has h/o DM  B  start lantus 6  c/w sliding scale    Heme/onc:   mild leukocytosis; likely reactive   will monitor for now     thrombocytopenia   plt < 140   184 on   ctm    Skin/ catheter: 2 peripheral IV, RRAL, RIJ    Prophylaxis: lovenox qd     Goals of Care: Full code     Dispo: ICU          59 yo M with PMH including HTN, HLD, DM, CHF, CAD (w stents), COPD on 2.5L home 02 and night BIPAP ( 35%) presents from [facility] for shortness of breath. ICU was consulted and admitted pt for Acute on chronic hypercapnic respiratory failure requiring intubation       Neuro:  Intubated and sedated w/ prop and precedex  hold sedation  SBT and possible extubation       Cardiovascular:  Patient has h/o CHF, on lasix at home  Does not look fluid overloaded, not in CHF exacerbation  will hold Lasix for now       Pulmonary:   Acute on chronic Hypoxic, Hypercapnic Respiratory failure   ETT   hx of multiple exacerbations, >2 in past year, class D COPD   c/w IV solumedrol 40 q8h, de-escalate as needed   Started on cefepime b/c thick secretions   CXR and ABG qd  SBT and possible extubation    Infectious Diseases:  no active issues     Gastrointestinal:  TF    Renal:  no active issues     Endo:   Has h/o DM  B  Increase lantus to 8  c/w sliding scale    Heme/onc:   mild leukocytosis; likely reactive   will monitor for now     thrombocytopenia   plt < 140   184 on   ctm    Skin/ catheter: 2 peripheral IV, RRAL, RIJ    Prophylaxis: lovenox qd     Goals of Care: Full code     Dispo: ICU

## 2021-07-28 NOTE — PROGRESS NOTE ADULT - SUBJECTIVE AND OBJECTIVE BOX
`Patient is a 60y old  Male who presents with a chief complaint of AHHRF (2021 10:20)    PATIENT IS SEEN AND EXAMINED IN MEDICAL FLOOR.  NGT [    ]    MT [   ]      GT [   ]    ALLERGIES:  No Known Allergies      Daily     Daily Weight in k.9 (2021 07:00)    VITALS:    Vital Signs Last 24 Hrs  T(C): 36.3 (2021 12:00), Max: 37.4 (2021 19:30)  T(F): 97.3 (2021 12:00), Max: 99.3 (2021 19:30)  HR: 60 (2021 12:00) (58 - 70)  BP: 124/64 (2021 12:00) (122/73 - 146/83)  BP(mean): 77 (2021 12:00) (77 - 99)  RR: 29 (2021 12:00) (4 - 32)  SpO2: 90% (2021 12:00) (90% - 99%)    LABS:    CBC Full  -  ( 2021 06:32 )  WBC Count : 8.68 K/uL  RBC Count : 4.53 M/uL  Hemoglobin : 12.7 g/dL  Hematocrit : 40.3 %  Platelet Count - Automated : 176 K/uL  Mean Cell Volume : 89.0 fl  Mean Cell Hemoglobin : 28.0 pg  Mean Cell Hemoglobin Concentration : 31.5 gm/dL  Auto Neutrophil # : x  Auto Lymphocyte # : x  Auto Monocyte # : x  Auto Eosinophil # : x  Auto Basophil # : x  Auto Neutrophil % : x  Auto Lymphocyte % : x  Auto Monocyte % : x  Auto Eosinophil % : x  Auto Basophil % : x          134<L>  |  98  |  25<H>  ----------------------------<  272<H>  4.9   |  34<H>  |  0.51    Ca    8.4      2021 06:32  Phos  3.5       Mg     2.2         TPro  5.7<L>  /  Alb  2.6<L>  /  TBili  0.4  /  DBili  x   /  AST  7<L>  /  ALT  20  /  AlkPhos  55      CAPILLARY BLOOD GLUCOSE      POCT Blood Glucose.: 248 mg/dL (2021 11:43)  POCT Blood Glucose.: 242 mg/dL (2021 05:31)  POCT Blood Glucose.: 252 mg/dL (2021 23:13)  POCT Blood Glucose.: 246 mg/dL (2021 17:10)    CARDIAC MARKERS ( 2021 06:46 )  0.102 ng/mL / x     / 19 U/L / x     / x      CARDIAC MARKERS ( 2021 13:51 )  <0.015 ng/mL / x     / 12 U/L / x     / 1.1 ng/mL      LIVER FUNCTIONS - ( 2021 06:32 )  Alb: 2.6 g/dL / Pro: 5.7 g/dL / ALK PHOS: 55 U/L / ALT: 20 U/L DA / AST: 7 U/L / GGT: x           Creatinine Trend: 0.51<--, 0.52<--, 0.55<--, 0.42<--, 0.56<--, 0.50<--  I&O's Summary    2021 07:01  -  2021 07:00  --------------------------------------------------------  IN: 1025.8 mL / OUT: 1035 mL / NET: -9.2 mL    2021 07:01  -  2021 12:31  --------------------------------------------------------  IN: 161.8 mL / OUT: 570 mL / NET: -408.2 mL        ABG - ( 2021 03:56 )  pH, Arterial: 7.43  pH, Blood: x     /  pCO2: 51    /  pO2: 67    / HCO3: 34    / Base Excess: 8.0   /  SaO2: 94                      MEDICATIONS:    MEDICATIONS  (STANDING):  ALBUTerol    90 MICROgram(s) HFA Inhaler 2 Puff(s) Inhalation every 6 hours  artificial  tears Solution 1 Drop(s) Both EYES three times a day  aspirin  chewable 81 milliGRAM(s) Oral daily  cefepime   IVPB 2000 milliGRAM(s) IV Intermittent every 12 hours  cefepime   IVPB      chlorhexidine 0.12% Liquid 15 milliLiter(s) Oral Mucosa every 12 hours  chlorhexidine 2% Cloths 1 Application(s) Topical <User Schedule>  dexMEDEtomidine Infusion 0.5 MICROgram(s)/kG/Hr (11.7 mL/Hr) IV Continuous <Continuous>  dextrose 50% Injectable 12.5 Gram(s) IV Push once  enoxaparin Injectable 40 milliGRAM(s) SubCutaneous daily  insulin glargine Injectable (LANTUS) 6 Unit(s) SubCutaneous at bedtime  insulin lispro (ADMELOG) corrective regimen sliding scale   SubCutaneous every 6 hours  ipratropium 17 MICROgram(s) HFA Inhaler 1 Puff(s) Inhalation every 6 hours  methylPREDNISolone sodium succinate Injectable 40 milliGRAM(s) IV Push three times a day  pantoprazole  Injectable 40 milliGRAM(s) IV Push daily  polyethylene glycol 3350 17 Gram(s) Oral daily  propofol Infusion 10 MICROgram(s)/kG/Min (5.44 mL/Hr) IV Continuous <Continuous>  risperiDONE   Tablet 1 milliGRAM(s) Oral daily  senna 2 Tablet(s) Oral at bedtime  traZODone 150 milliGRAM(s) Oral at bedtime      MEDICATIONS  (PRN):  sodium chloride 0.9% lock flush 10 milliLiter(s) IV Push every 1 hour PRN Pre/post blood products, medications, blood draw, and to maintain line patency      REVIEW OF SYSTEMS:                           ALL ROS DONE [ X   ]    CONSTITUTIONAL:  LETHARGIC [   ], FEVER [   ], UNRESPONSIVE [   ]  CVS:  CP  [   ], SOB, [   ], PALPITATIONS [   ], DIZZYNESS [   ]  RS: COUGH [   ], SPUTUM [   ]  GI: ABDOMINAL PAIN [   ], NAUSEA [   ], VOMITINGS [   ], DIARRHEA [   ], CONSTIPATION [   ]  :  DYSURIA [   ], NOCTURIA [   ], INCREASED FREQUENCY [   ], DRIBLING [   ],  SKELETAL: PAINFUL JOINTS [   ], SWOLLEN JOINTS [   ], NECK ACHE [   ], LOW BACK ACHE [   ],  SKIN : ULCERS [   ], RASH [   ], ITCHING [   ]  CNS: HEAD ACHE [   ], DOUBLE VISION [   ], BLURRED VISION [   ], AMS / CONFUSION [   ], SEIZURES [   ], WEAKNESS [   ],TINGLING / NUMBNESS [   ]    PHYSICAL EXAMINATION:  GENERAL APPEARANCE: NO DISTRESS  HEENT:  NO PALLOR, NO  JVD,  NO   NODES, NECK SUPPLE  CVS: S1 +, S2 +,   RS: AEEB,  OCCASIONAL  RALES +,   NO RONCHI  ABD: SOFT, NT, NO, BS +  EXT: NO PE  SKIN: WARM,   CVC+  SKELETAL:  ROM ACCEPTABLE, LEFT ANKLE   CNS:  AAO X 0-1, NO  DEFICITS    RADIOLOGY :    EXAM:  XR CHEST PORTABLE URGENT 1V                            PROCEDURE DATE:  2021          INTERPRETATION:  History: Shortness of breath. Difficulty breathing.    FINDINGS: Frontal chest.    COMPARISON: 2021.    Exam is limited due to collimation at the lung bases.    Heart size may be exaggerated by AP technique. Mild bronchovascular crowding at the bases. There is no focal lung consolidation or sizable pleural effusion. No significant osseous abnormality. Degenerative change of the spine.    IMPRESSION:    Unremarkable frontal chest.    ==================================================    EXAM:  XR ANKLE COMP MIN 3 VIEWS LT                            PROCEDURE DATE:  2021          INTERPRETATION:  History: Fall, ankle pain.    FINDINGS: Frontal, lateral and oblique projections of the left ankle.    Diffuse soft tissue swelling. Osteopenia. No fracture or dislocation. Ankle mortise is aligned. Extensive vascular calcification.    IMPRESSION:    No fracture or dislocation left ankle appreciated.      ASSESSMENT :       PLAN:  HPI:  61 yo M with PMH including HTN, HLD, DM, CHF, CAD (w stents), COPD on 2.5L home 02 and night BIPAP (/ 35%) presents from [facility] for shortness of breath. When EMS arrived pt was noted to be in respiratory distress and his portable O2 machine was uncharged and not running. Pt endorsed to ED physician his SOB has worsened overnight after being discharged 1 day PTA. Off note Pt was here last night for left ankle pain, He was diagnosed with non-displaced left lateral malleolus fracture. Pt was noted to be tachycardic and sob but refused admission and discharged with cast. Pt was Ax0 x3 in ED but Upon my examination pt was lethargic and unable to answer any questions.  (2021 09:56)    # ACUTE ON CHRONIC HYPOXIC HYPERCAPNEIC RESPIRATORY FAILURE, EMPHYSEMA - INTUBATED, ON SOLUMEDROL, ON DUONEB  - TRYING TO WEAN SEDATION FOR SBT  # MORBIDLY OBESE WITH RESTRICTIVE LUNG DISEASE, SUSPECT OBSTRUCTIVE SLEEP APNOEA , COPD ON O2 NC AT BASELINE  # NONDISPLACED LEFT LATERAL MALLEOLUS FRACTURE - PODIATRY CONSULT  # AMS - SUSPECT S/T HYPERCAPNEA   # HTN  # HLD  # CHRONIC CHF  # CAD S/P STENT  # DM  # TOBACCO ABUSE  #  GI AND DVT PROPHYLAXIS    NAEEM CABELLO MD COVERING ADRIANA CABELLO MD     Patient is a 60y old  Male who presents with a chief complaint of AHHRF (2021 10:20)    PATIENT IS SEEN AND EXAMINED IN MEDICAL FLOOR.    ALLERGIES:  No Known Allergies      Daily     Daily Weight in k.9 (2021 07:00)    VITALS:    Vital Signs Last 24 Hrs  T(C): 36.3 (2021 12:00), Max: 37.4 (2021 19:30)  T(F): 97.3 (2021 12:00), Max: 99.3 (2021 19:30)  HR: 60 (2021 12:00) (58 - 70)  BP: 124/64 (2021 12:00) (122/73 - 146/83)  BP(mean): 77 (2021 12:00) (77 - 99)  RR: 29 (2021 12:00) (4 - 32)  SpO2: 90% (2021 12:00) (90% - 99%)    LABS:    CBC Full  -  ( 2021 06:32 )  WBC Count : 8.68 K/uL  RBC Count : 4.53 M/uL  Hemoglobin : 12.7 g/dL  Hematocrit : 40.3 %  Platelet Count - Automated : 176 K/uL  Mean Cell Volume : 89.0 fl  Mean Cell Hemoglobin : 28.0 pg  Mean Cell Hemoglobin Concentration : 31.5 gm/dL  Auto Neutrophil # : x  Auto Lymphocyte # : x  Auto Monocyte # : x  Auto Eosinophil # : x  Auto Basophil # : x  Auto Neutrophil % : x  Auto Lymphocyte % : x  Auto Monocyte % : x  Auto Eosinophil % : x  Auto Basophil % : x          134<L>  |  98  |  25<H>  ----------------------------<  272<H>  4.9   |  34<H>  |  0.51    Ca    8.4      2021 06:32  Phos  3.5       Mg     2.2         TPro  5.7<L>  /  Alb  2.6<L>  /  TBili  0.4  /  DBili  x   /  AST  7<L>  /  ALT  20  /  AlkPhos  55      CAPILLARY BLOOD GLUCOSE      POCT Blood Glucose.: 248 mg/dL (2021 11:43)  POCT Blood Glucose.: 242 mg/dL (2021 05:31)  POCT Blood Glucose.: 252 mg/dL (2021 23:13)  POCT Blood Glucose.: 246 mg/dL (2021 17:10)    CARDIAC MARKERS ( 2021 06:46 )  0.102 ng/mL / x     / 19 U/L / x     / x      CARDIAC MARKERS ( 2021 13:51 )  <0.015 ng/mL / x     / 12 U/L / x     / 1.1 ng/mL      LIVER FUNCTIONS - ( 2021 06:32 )  Alb: 2.6 g/dL / Pro: 5.7 g/dL / ALK PHOS: 55 U/L / ALT: 20 U/L DA / AST: 7 U/L / GGT: x           Creatinine Trend: 0.51<--, 0.52<--, 0.55<--, 0.42<--, 0.56<--, 0.50<--  I&O's Summary    2021 07:01  -  2021 07:00  --------------------------------------------------------  IN: 1025.8 mL / OUT: 1035 mL / NET: -9.2 mL    2021 07:01  -  2021 12:31  --------------------------------------------------------  IN: 161.8 mL / OUT: 570 mL / NET: -408.2 mL        ABG - ( 2021 03:56 )  pH, Arterial: 7.43  pH, Blood: x     /  pCO2: 51    /  pO2: 67    / HCO3: 34    / Base Excess: 8.0   /  SaO2: 94                      MEDICATIONS:    MEDICATIONS  (STANDING):  ALBUTerol    90 MICROgram(s) HFA Inhaler 2 Puff(s) Inhalation every 6 hours  artificial  tears Solution 1 Drop(s) Both EYES three times a day  aspirin  chewable 81 milliGRAM(s) Oral daily  cefepime   IVPB 2000 milliGRAM(s) IV Intermittent every 12 hours  cefepime   IVPB      chlorhexidine 0.12% Liquid 15 milliLiter(s) Oral Mucosa every 12 hours  chlorhexidine 2% Cloths 1 Application(s) Topical <User Schedule>  dexMEDEtomidine Infusion 0.5 MICROgram(s)/kG/Hr (11.7 mL/Hr) IV Continuous <Continuous>  dextrose 50% Injectable 12.5 Gram(s) IV Push once  enoxaparin Injectable 40 milliGRAM(s) SubCutaneous daily  insulin glargine Injectable (LANTUS) 6 Unit(s) SubCutaneous at bedtime  insulin lispro (ADMELOG) corrective regimen sliding scale   SubCutaneous every 6 hours  ipratropium 17 MICROgram(s) HFA Inhaler 1 Puff(s) Inhalation every 6 hours  methylPREDNISolone sodium succinate Injectable 40 milliGRAM(s) IV Push three times a day  pantoprazole  Injectable 40 milliGRAM(s) IV Push daily  polyethylene glycol 3350 17 Gram(s) Oral daily  propofol Infusion 10 MICROgram(s)/kG/Min (5.44 mL/Hr) IV Continuous <Continuous>  risperiDONE   Tablet 1 milliGRAM(s) Oral daily  senna 2 Tablet(s) Oral at bedtime  traZODone 150 milliGRAM(s) Oral at bedtime      MEDICATIONS  (PRN):  sodium chloride 0.9% lock flush 10 milliLiter(s) IV Push every 1 hour PRN Pre/post blood products, medications, blood draw, and to maintain line patency      REVIEW OF SYSTEMS:                           ALL ROS DONE [ X   ]    CONSTITUTIONAL:  LETHARGIC [   ], FEVER [   ], UNRESPONSIVE [   ]  CVS:  CP  [   ], SOB, [   ], PALPITATIONS [   ], DIZZYNESS [   ]  RS: COUGH [   ], SPUTUM [   ]  GI: ABDOMINAL PAIN [   ], NAUSEA [   ], VOMITINGS [   ], DIARRHEA [   ], CONSTIPATION [   ]  :  DYSURIA [   ], NOCTURIA [   ], INCREASED FREQUENCY [   ], DRIBLING [   ],  SKELETAL: PAINFUL JOINTS [   ], SWOLLEN JOINTS [   ], NECK ACHE [   ], LOW BACK ACHE [   ],  SKIN : ULCERS [   ], RASH [   ], ITCHING [   ]  CNS: HEAD ACHE [   ], DOUBLE VISION [   ], BLURRED VISION [   ], AMS / CONFUSION [   ], SEIZURES [   ], WEAKNESS [   ],TINGLING / NUMBNESS [   ]    PHYSICAL EXAMINATION:  GENERAL APPEARANCE: NO DISTRESS  HEENT:  NO PALLOR, NO  JVD,  NO   NODES, NECK SUPPLE  CVS: S1 +, S2 +,   RS: AEEB,  OCCASIONAL  RALES +,   RHONCHI, ON NRB +  ABD: SOFT, NT, NO, BS +    NGTUBE+  EXT: NO PE  SKIN: WARM,   CVC+  SKELETAL:  ROM ACCEPTABLE, LEFT ANKLE   CNS:  AAO X 0-1, NO  DEFICITS    RADIOLOGY :    EXAM:  XR CHEST PORTABLE URGENT 1V                            PROCEDURE DATE:  2021          INTERPRETATION:  History: Shortness of breath. Difficulty breathing.    FINDINGS: Frontal chest.    COMPARISON: 2021.    Exam is limited due to collimation at the lung bases.    Heart size may be exaggerated by AP technique. Mild bronchovascular crowding at the bases. There is no focal lung consolidation or sizable pleural effusion. No significant osseous abnormality. Degenerative change of the spine.    IMPRESSION:    Unremarkable frontal chest.    ==================================================    EXAM:  XR ANKLE COMP MIN 3 VIEWS LT                            PROCEDURE DATE:  2021          INTERPRETATION:  History: Fall, ankle pain.    FINDINGS: Frontal, lateral and oblique projections of the left ankle.    Diffuse soft tissue swelling. Osteopenia. No fracture or dislocation. Ankle mortise is aligned. Extensive vascular calcification.    IMPRESSION:    No fracture or dislocation left ankle appreciated.      ASSESSMENT :       PLAN:  HPI:  59 yo M with PMH including HTN, HLD, DM, CHF, CAD (w stents), COPD on 2.5L home 02 and night BIPAP (/ 35%) presents from [facility] for shortness of breath. When EMS arrived pt was noted to be in respiratory distress and his portable O2 machine was uncharged and not running. Pt endorsed to ED physician his SOB has worsened overnight after being discharged 1 day PTA. Off note Pt was here last night for left ankle pain, He was diagnosed with non-displaced left lateral malleolus fracture. Pt was noted to be tachycardic and sob but refused admission and discharged with cast. Pt was Ax0 x3 in ED but Upon my examination pt was lethargic and unable to answer any questions.  (2021 09:56)    # ACUTE ON CHRONIC HYPOXIC HYPERCAPNEIC RESPIRATORY FAILURE, EMPHYSEMA - S/P INTUBATION, ON SOLUMEDROL, ON DUONEB  - ON NRB  - STARTED ON CEFEPIME  # MORBIDLY OBESE WITH RESTRICTIVE LUNG DISEASE, SUSPECT OBSTRUCTIVE SLEEP APNOEA , COPD ON O2 NC AT BASELINE  # NONDISPLACED LEFT LATERAL MALLEOLUS FRACTURE - PODIATRY CONSULT  # AMS - SUSPECT S/T HYPERCAPNEA   # HTN  # HLD  # CHRONIC CHF  # CAD S/P STENT  # DM  # TOBACCO ABUSE  #  GI AND DVT PROPHYLAXIS    NAEEM CABELLO MD COVERING ADRIANA CABELLO MD

## 2021-07-28 NOTE — PROGRESS NOTE ADULT - ASSESSMENT
A/P   DM   possible ankle contusion?  was diagnosed with  fracture ?  P:  pt seen and eval   not ambulating  rec: light dressing ACE if no contradictions left foot /ankle    (applied )  may need routine nail care   will re-eval in few days   thank you for this referral   please call [phone] with any pt related questions

## 2021-07-28 NOTE — AIRWAY REMOVAL NOTE  ADULT & PEDS - ARTIFICAL AIRWAY REMOVAL COMMENTS
pt was weaned and extubated. HR62, O2sat. 93%, RR26.  no resp. distress noted at this moment. continue close monitoring.

## 2021-07-28 NOTE — PROGRESS NOTE ADULT - SUBJECTIVE AND OBJECTIVE BOX
INTERVAL HPI/OVERNIGHT EVENTS: NAOE    PRESSORS: [ ] YES [x ] NO  WHICH:      Antimicrobial:  cefepime   IVPB 2000 milliGRAM(s) IV Intermittent every 12 hours  cefepime   IVPB        Cardiovascular:    Pulmonary:  ALBUTerol    90 MICROgram(s) HFA Inhaler 2 Puff(s) Inhalation every 6 hours  ipratropium 17 MICROgram(s) HFA Inhaler 1 Puff(s) Inhalation every 6 hours    Hematalogic:  aspirin  chewable 81 milliGRAM(s) Oral daily  enoxaparin Injectable 40 milliGRAM(s) SubCutaneous daily    Other:  artificial  tears Solution 1 Drop(s) Both EYES three times a day  chlorhexidine 0.12% Liquid 15 milliLiter(s) Oral Mucosa every 12 hours  chlorhexidine 2% Cloths 1 Application(s) Topical <User Schedule>  dexMEDEtomidine Infusion 0.5 MICROgram(s)/kG/Hr IV Continuous <Continuous>  dextrose 50% Injectable 12.5 Gram(s) IV Push once  insulin glargine Injectable (LANTUS) 6 Unit(s) SubCutaneous at bedtime  insulin lispro (ADMELOG) corrective regimen sliding scale   SubCutaneous every 6 hours  methylPREDNISolone sodium succinate Injectable 40 milliGRAM(s) IV Push three times a day  pantoprazole  Injectable 40 milliGRAM(s) IV Push daily  polyethylene glycol 3350 17 Gram(s) Oral daily  propofol Infusion 10 MICROgram(s)/kG/Min IV Continuous <Continuous>  risperiDONE   Tablet 1 milliGRAM(s) Oral daily  senna 2 Tablet(s) Oral at bedtime  sodium chloride 0.9% lock flush 10 milliLiter(s) IV Push every 1 hour PRN  traZODone 150 milliGRAM(s) Oral at bedtime    ALBUTerol    90 MICROgram(s) HFA Inhaler 2 Puff(s) Inhalation every 6 hours  artificial  tears Solution 1 Drop(s) Both EYES three times a day  aspirin  chewable 81 milliGRAM(s) Oral daily  cefepime   IVPB 2000 milliGRAM(s) IV Intermittent every 12 hours  cefepime   IVPB      chlorhexidine 0.12% Liquid 15 milliLiter(s) Oral Mucosa every 12 hours  chlorhexidine 2% Cloths 1 Application(s) Topical <User Schedule>  dexMEDEtomidine Infusion 0.5 MICROgram(s)/kG/Hr IV Continuous <Continuous>  dextrose 50% Injectable 12.5 Gram(s) IV Push once  enoxaparin Injectable 40 milliGRAM(s) SubCutaneous daily  insulin glargine Injectable (LANTUS) 6 Unit(s) SubCutaneous at bedtime  insulin lispro (ADMELOG) corrective regimen sliding scale   SubCutaneous every 6 hours  ipratropium 17 MICROgram(s) HFA Inhaler 1 Puff(s) Inhalation every 6 hours  methylPREDNISolone sodium succinate Injectable 40 milliGRAM(s) IV Push three times a day  pantoprazole  Injectable 40 milliGRAM(s) IV Push daily  polyethylene glycol 3350 17 Gram(s) Oral daily  propofol Infusion 10 MICROgram(s)/kG/Min IV Continuous <Continuous>  risperiDONE   Tablet 1 milliGRAM(s) Oral daily  senna 2 Tablet(s) Oral at bedtime  sodium chloride 0.9% lock flush 10 milliLiter(s) IV Push every 1 hour PRN  traZODone 150 milliGRAM(s) Oral at bedtime    Drug Dosing Weight  Height (cm): 172.7 (25 Jul 2021 07:05)  Weight (kg): 93.5 (25 Jul 2021 10:35)  BMI (kg/m2): 31.3 (25 Jul 2021 10:35)  BSA (m2): 2.07 (25 Jul 2021 10:35)    CENTRAL LINE: x[ ] YES [ ] NO  LOCATION:         AMOR: [ ] YES [ ] NO          A-LINE:  [ ] YES [ ] NO  LOCATION:             ICU Vital Signs Last 24 Hrs  T(C): 37.1 (28 Jul 2021 08:00), Max: 37.4 (27 Jul 2021 19:30)  T(F): 98.8 (28 Jul 2021 08:00), Max: 99.3 (27 Jul 2021 19:30)  HR: 62 (28 Jul 2021 10:00) (58 - 86)  BP: 127/71 (28 Jul 2021 10:00) (100/65 - 146/83)  BP(mean): 84 (28 Jul 2021 10:00) (72 - 99)  ABP: 139/70 (28 Jul 2021 10:00) (78/42 - 156/74)  ABP(mean): 91 (28 Jul 2021 10:00) (55 - 117)  RR: 26 (28 Jul 2021 10:00) (4 - 32)  SpO2: 95% (28 Jul 2021 10:00) (89% - 99%)      ABG - ( 28 Jul 2021 03:56 )  pH, Arterial: 7.43  pH, Blood: x     /  pCO2: 51    /  pO2: 67    / HCO3: 34    / Base Excess: 8.0   /  SaO2: 94                    07-27 @ 07:01  -  07-28 @ 07:00  --------------------------------------------------------  IN: 1025.8 mL / OUT: 1035 mL / NET: -9.2 mL        Mode: PS (Pressure Support)/ Spontaneous  FiO2: 50  PEEP: 5  PS: 5  ITime: 1  MAP: 7  PIP: 12      REVIEW OF SYSTEMS:    CONSTITUTIONAL: No weakness, fevers or chills  NECK: No pain or stiffness  RESPIRATORY: No cough, wheezing, hemoptysis; No shortness of breath  CARDIOVASCULAR: No chest pain or palpitations  GASTROINTESTINAL: No abdominal or epigastric pain. No nausea, vomiting, No diarrhea or constipation. No melena or hematochezia.  GENITOURINARY: No dysuria, frequency or hematuria  NEUROLOGICAL: No numbness or weakness  All other review of systems is negative unless indicated above      PHYSICAL EXAM:    GENERAL: NAD, well-groomed, well-developed  EYES: EOMI, PERRLA,   NECK: Supple, No JVD; Normal thyroid; Trachea midline  NERVOUS SYSTEM:  Alert & Oriented X3,  Motor Strength 5/5 B/L upper and lower extremities; DTRs 2+ intact and symmetric  CHEST/LUNG: No rales, rhonchi, wheezing   HEART: Regular rate and rhythm; No murmurs,   ABDOMEN: Soft, Nontender, Nondistended; Bowel sounds present  EXTREMITIES:  2+ Peripheral Pulses, No clubbing, cyanosis, or edema        LABS:  CBC Full  -  ( 28 Jul 2021 06:32 )  WBC Count : 8.68 K/uL  RBC Count : 4.53 M/uL  Hemoglobin : 12.7 g/dL  Hematocrit : 40.3 %  Platelet Count - Automated : 176 K/uL  Mean Cell Volume : 89.0 fl  Mean Cell Hemoglobin : 28.0 pg  Mean Cell Hemoglobin Concentration : 31.5 gm/dL  Auto Neutrophil # : x  Auto Lymphocyte # : x  Auto Monocyte # : x  Auto Eosinophil # : x  Auto Basophil # : x  Auto Neutrophil % : x  Auto Lymphocyte % : x  Auto Monocyte % : x  Auto Eosinophil % : x  Auto Basophil % : x    07-28    134<L>  |  98  |  25<H>  ----------------------------<  272<H>  4.9   |  34<H>  |  0.51    Ca    8.4      28 Jul 2021 06:32  Phos  3.5     07-28  Mg     2.2     07-28    TPro  5.7<L>  /  Alb  2.6<L>  /  TBili  0.4  /  DBili  x   /  AST  7<L>  /  ALT  20  /  AlkPhos  55  07-28            RADIOLOGY & ADDITIONAL STUDIES REVIEWED:  ***    [ ]GOALS OF CARE DISCUSSION WITH PATIENT/FAMILY/PROXY:    CRITICAL CARE TIME SPENT: 35 minutes INTERVAL HPI/OVERNIGHT EVENTS: NAOE    PRESSORS: [ ] YES [x ] NO  WHICH:      Antimicrobial:  cefepime   IVPB 2000 milliGRAM(s) IV Intermittent every 12 hours  cefepime   IVPB        Cardiovascular:    Pulmonary:  ALBUTerol    90 MICROgram(s) HFA Inhaler 2 Puff(s) Inhalation every 6 hours  ipratropium 17 MICROgram(s) HFA Inhaler 1 Puff(s) Inhalation every 6 hours    Hematalogic:  aspirin  chewable 81 milliGRAM(s) Oral daily  enoxaparin Injectable 40 milliGRAM(s) SubCutaneous daily    Other:  artificial  tears Solution 1 Drop(s) Both EYES three times a day  chlorhexidine 0.12% Liquid 15 milliLiter(s) Oral Mucosa every 12 hours  chlorhexidine 2% Cloths 1 Application(s) Topical <User Schedule>  dexMEDEtomidine Infusion 0.5 MICROgram(s)/kG/Hr IV Continuous <Continuous>  dextrose 50% Injectable 12.5 Gram(s) IV Push once  insulin glargine Injectable (LANTUS) 6 Unit(s) SubCutaneous at bedtime  insulin lispro (ADMELOG) corrective regimen sliding scale   SubCutaneous every 6 hours  methylPREDNISolone sodium succinate Injectable 40 milliGRAM(s) IV Push three times a day  pantoprazole  Injectable 40 milliGRAM(s) IV Push daily  polyethylene glycol 3350 17 Gram(s) Oral daily  propofol Infusion 10 MICROgram(s)/kG/Min IV Continuous <Continuous>  risperiDONE   Tablet 1 milliGRAM(s) Oral daily  senna 2 Tablet(s) Oral at bedtime  sodium chloride 0.9% lock flush 10 milliLiter(s) IV Push every 1 hour PRN  traZODone 150 milliGRAM(s) Oral at bedtime    ALBUTerol    90 MICROgram(s) HFA Inhaler 2 Puff(s) Inhalation every 6 hours  artificial  tears Solution 1 Drop(s) Both EYES three times a day  aspirin  chewable 81 milliGRAM(s) Oral daily  cefepime   IVPB 2000 milliGRAM(s) IV Intermittent every 12 hours  cefepime   IVPB      chlorhexidine 0.12% Liquid 15 milliLiter(s) Oral Mucosa every 12 hours  chlorhexidine 2% Cloths 1 Application(s) Topical <User Schedule>  dexMEDEtomidine Infusion 0.5 MICROgram(s)/kG/Hr IV Continuous <Continuous>  dextrose 50% Injectable 12.5 Gram(s) IV Push once  enoxaparin Injectable 40 milliGRAM(s) SubCutaneous daily  insulin glargine Injectable (LANTUS) 6 Unit(s) SubCutaneous at bedtime  insulin lispro (ADMELOG) corrective regimen sliding scale   SubCutaneous every 6 hours  ipratropium 17 MICROgram(s) HFA Inhaler 1 Puff(s) Inhalation every 6 hours  methylPREDNISolone sodium succinate Injectable 40 milliGRAM(s) IV Push three times a day  pantoprazole  Injectable 40 milliGRAM(s) IV Push daily  polyethylene glycol 3350 17 Gram(s) Oral daily  propofol Infusion 10 MICROgram(s)/kG/Min IV Continuous <Continuous>  risperiDONE   Tablet 1 milliGRAM(s) Oral daily  senna 2 Tablet(s) Oral at bedtime  sodium chloride 0.9% lock flush 10 milliLiter(s) IV Push every 1 hour PRN  traZODone 150 milliGRAM(s) Oral at bedtime    Drug Dosing Weight  Height (cm): 172.7 (25 Jul 2021 07:05)  Weight (kg): 93.5 (25 Jul 2021 10:35)  BMI (kg/m2): 31.3 (25 Jul 2021 10:35)  BSA (m2): 2.07 (25 Jul 2021 10:35)    CENTRAL LINE: x[ ] YES [ ] NO  LOCATION:         AMOR: [ ] YES [ ] NO          A-LINE:  [ ] YES [ ] NO  LOCATION:             ICU Vital Signs Last 24 Hrs  T(C): 37.1 (28 Jul 2021 08:00), Max: 37.4 (27 Jul 2021 19:30)  T(F): 98.8 (28 Jul 2021 08:00), Max: 99.3 (27 Jul 2021 19:30)  HR: 62 (28 Jul 2021 10:00) (58 - 86)  BP: 127/71 (28 Jul 2021 10:00) (100/65 - 146/83)  BP(mean): 84 (28 Jul 2021 10:00) (72 - 99)  ABP: 139/70 (28 Jul 2021 10:00) (78/42 - 156/74)  ABP(mean): 91 (28 Jul 2021 10:00) (55 - 117)  RR: 26 (28 Jul 2021 10:00) (4 - 32)  SpO2: 95% (28 Jul 2021 10:00) (89% - 99%)      ABG - ( 28 Jul 2021 03:56 )  pH, Arterial: 7.43  pH, Blood: x     /  pCO2: 51    /  pO2: 67    / HCO3: 34    / Base Excess: 8.0   /  SaO2: 94                    07-27 @ 07:01  -  07-28 @ 07:00  --------------------------------------------------------  IN: 1025.8 mL / OUT: 1035 mL / NET: -9.2 mL        Mode: PS (Pressure Support)/ Spontaneous  FiO2: 50  PEEP: 5  PS: 5  ITime: 1  MAP: 7  PIP: 12      PHYSICAL EXAM:    GENERAL: NAD, patient sedated in bed, increased AP diameter  EYES:  pupils constricted but reactive  NECK: Supple, No JVD;  Trachea midline  NERVOUS SYSTEM:  Alert & Oriented X0  CHEST/LUNG: No rales, rhonchi, wheezing   HEART: Regular rate and rhythm; No murmurs,   ABDOMEN: Soft, Nondistended; Bowel sounds present, 2 bruises on lower abdomen  EXTREMITIES:  No clubbing, cyanosis, or edema        LABS:  CBC Full  -  ( 28 Jul 2021 06:32 )  WBC Count : 8.68 K/uL  RBC Count : 4.53 M/uL  Hemoglobin : 12.7 g/dL  Hematocrit : 40.3 %  Platelet Count - Automated : 176 K/uL  Mean Cell Volume : 89.0 fl  Mean Cell Hemoglobin : 28.0 pg  Mean Cell Hemoglobin Concentration : 31.5 gm/dL  Auto Neutrophil # : x  Auto Lymphocyte # : x  Auto Monocyte # : x  Auto Eosinophil # : x  Auto Basophil # : x  Auto Neutrophil % : x  Auto Lymphocyte % : x  Auto Monocyte % : x  Auto Eosinophil % : x  Auto Basophil % : x    07-28    134<L>  |  98  |  25<H>  ----------------------------<  272<H>  4.9   |  34<H>  |  0.51    Ca    8.4      28 Jul 2021 06:32  Phos  3.5     07-28  Mg     2.2     07-28    TPro  5.7<L>  /  Alb  2.6<L>  /  TBili  0.4  /  DBili  x   /  AST  7<L>  /  ALT  20  /  AlkPhos  55  07-28            RADIOLOGY & ADDITIONAL STUDIES REVIEWED:          [ ]GOALS OF CARE DISCUSSION WITH PATIENT/FAMILY/PROXY:    CRITICAL CARE TIME SPENT: 35 minutes INTERVAL HPI/OVERNIGHT EVENTS: NAOE    PRESSORS: [ ] YES [x ] NO  WHICH:      Antimicrobial:  cefepime   IVPB 2000 milliGRAM(s) IV Intermittent every 12 hours  cefepime   IVPB        Cardiovascular:    Pulmonary:  ALBUTerol    90 MICROgram(s) HFA Inhaler 2 Puff(s) Inhalation every 6 hours  ipratropium 17 MICROgram(s) HFA Inhaler 1 Puff(s) Inhalation every 6 hours    Hematalogic:  aspirin  chewable 81 milliGRAM(s) Oral daily  enoxaparin Injectable 40 milliGRAM(s) SubCutaneous daily    Other:  artificial  tears Solution 1 Drop(s) Both EYES three times a day  chlorhexidine 0.12% Liquid 15 milliLiter(s) Oral Mucosa every 12 hours  chlorhexidine 2% Cloths 1 Application(s) Topical <User Schedule>  dexMEDEtomidine Infusion 0.5 MICROgram(s)/kG/Hr IV Continuous <Continuous>  dextrose 50% Injectable 12.5 Gram(s) IV Push once  insulin glargine Injectable (LANTUS) 6 Unit(s) SubCutaneous at bedtime  insulin lispro (ADMELOG) corrective regimen sliding scale   SubCutaneous every 6 hours  methylPREDNISolone sodium succinate Injectable 40 milliGRAM(s) IV Push three times a day  pantoprazole  Injectable 40 milliGRAM(s) IV Push daily  polyethylene glycol 3350 17 Gram(s) Oral daily  propofol Infusion 10 MICROgram(s)/kG/Min IV Continuous <Continuous>  risperiDONE   Tablet 1 milliGRAM(s) Oral daily  senna 2 Tablet(s) Oral at bedtime  sodium chloride 0.9% lock flush 10 milliLiter(s) IV Push every 1 hour PRN  traZODone 150 milliGRAM(s) Oral at bedtime    ALBUTerol    90 MICROgram(s) HFA Inhaler 2 Puff(s) Inhalation every 6 hours  artificial  tears Solution 1 Drop(s) Both EYES three times a day  aspirin  chewable 81 milliGRAM(s) Oral daily  cefepime   IVPB 2000 milliGRAM(s) IV Intermittent every 12 hours  cefepime   IVPB      chlorhexidine 0.12% Liquid 15 milliLiter(s) Oral Mucosa every 12 hours  chlorhexidine 2% Cloths 1 Application(s) Topical <User Schedule>  dexMEDEtomidine Infusion 0.5 MICROgram(s)/kG/Hr IV Continuous <Continuous>  dextrose 50% Injectable 12.5 Gram(s) IV Push once  enoxaparin Injectable 40 milliGRAM(s) SubCutaneous daily  insulin glargine Injectable (LANTUS) 6 Unit(s) SubCutaneous at bedtime  insulin lispro (ADMELOG) corrective regimen sliding scale   SubCutaneous every 6 hours  ipratropium 17 MICROgram(s) HFA Inhaler 1 Puff(s) Inhalation every 6 hours  methylPREDNISolone sodium succinate Injectable 40 milliGRAM(s) IV Push three times a day  pantoprazole  Injectable 40 milliGRAM(s) IV Push daily  polyethylene glycol 3350 17 Gram(s) Oral daily  propofol Infusion 10 MICROgram(s)/kG/Min IV Continuous <Continuous>  risperiDONE   Tablet 1 milliGRAM(s) Oral daily  senna 2 Tablet(s) Oral at bedtime  sodium chloride 0.9% lock flush 10 milliLiter(s) IV Push every 1 hour PRN  traZODone 150 milliGRAM(s) Oral at bedtime    Drug Dosing Weight  Height (cm): 172.7 (25 Jul 2021 07:05)  Weight (kg): 93.5 (25 Jul 2021 10:35)  BMI (kg/m2): 31.3 (25 Jul 2021 10:35)  BSA (m2): 2.07 (25 Jul 2021 10:35)    CENTRAL LINE: [x ] YES [x ] NO  LOCATION:   [facility] 7/25      AMOR: [x ] YES [ ] NO          A-LINE:  [x ] YES [ ] NO  LOCATION:  Right radial 7/26          ICU Vital Signs Last 24 Hrs  T(C): 37.1 (28 Jul 2021 08:00), Max: 37.4 (27 Jul 2021 19:30)  T(F): 98.8 (28 Jul 2021 08:00), Max: 99.3 (27 Jul 2021 19:30)  HR: 62 (28 Jul 2021 10:00) (58 - 86)  BP: 127/71 (28 Jul 2021 10:00) (100/65 - 146/83)  BP(mean): 84 (28 Jul 2021 10:00) (72 - 99)  ABP: 139/70 (28 Jul 2021 10:00) (78/42 - 156/74)  ABP(mean): 91 (28 Jul 2021 10:00) (55 - 117)  RR: 26 (28 Jul 2021 10:00) (4 - 32)  SpO2: 95% (28 Jul 2021 10:00) (89% - 99%)      ABG - ( 28 Jul 2021 03:56 )  pH, Arterial: 7.43  pH, Blood: x     /  pCO2: 51    /  pO2: 67    / HCO3: 34    / Base Excess: 8.0   /  SaO2: 94                    07-27 @ 07:01  -  07-28 @ 07:00  --------------------------------------------------------  IN: 1025.8 mL / OUT: 1035 mL / NET: -9.2 mL        Mode: PS (Pressure Support)/ Spontaneous  FiO2: 50  PEEP: 5  PS: 5  ITime: 1  MAP: 7  PIP: 12      PHYSICAL EXAM:    GENERAL: NAD, patient sedated in bed, increased AP diameter  EYES:  pupils constricted but reactive  NECK: Supple, No JVD;  Trachea midline  NERVOUS SYSTEM:  Alert & Oriented X0  CHEST/LUNG: No rales, rhonchi, wheezing   HEART: Regular rate and rhythm; No murmurs,   ABDOMEN: Soft, Nondistended; Bowel sounds present, 2 bruises on lower abdomen  EXTREMITIES:  No clubbing, cyanosis, or edema        LABS:  CBC Full  -  ( 28 Jul 2021 06:32 )  WBC Count : 8.68 K/uL  RBC Count : 4.53 M/uL  Hemoglobin : 12.7 g/dL  Hematocrit : 40.3 %  Platelet Count - Automated : 176 K/uL  Mean Cell Volume : 89.0 fl  Mean Cell Hemoglobin : 28.0 pg  Mean Cell Hemoglobin Concentration : 31.5 gm/dL  Auto Neutrophil # : x  Auto Lymphocyte # : x  Auto Monocyte # : x  Auto Eosinophil # : x  Auto Basophil # : x  Auto Neutrophil % : x  Auto Lymphocyte % : x  Auto Monocyte % : x  Auto Eosinophil % : x  Auto Basophil % : x    07-28    134<L>  |  98  |  25<H>  ----------------------------<  272<H>  4.9   |  34<H>  |  0.51    Ca    8.4      28 Jul 2021 06:32  Phos  3.5     07-28  Mg     2.2     07-28    TPro  5.7<L>  /  Alb  2.6<L>  /  TBili  0.4  /  DBili  x   /  AST  7<L>  /  ALT  20  /  AlkPhos  55  07-28            RADIOLOGY & ADDITIONAL STUDIES REVIEWED:          [ ]GOALS OF CARE DISCUSSION WITH PATIENT/FAMILY/PROXY:    CRITICAL CARE TIME SPENT: 35 minutes

## 2021-07-29 LAB
ALBUMIN SERPL ELPH-MCNC: 2.5 G/DL — LOW (ref 3.5–5)
ALP SERPL-CCNC: 48 U/L — SIGNIFICANT CHANGE UP (ref 40–120)
ALT FLD-CCNC: 20 U/L DA — SIGNIFICANT CHANGE UP (ref 10–60)
ANION GAP SERPL CALC-SCNC: 5 MMOL/L — SIGNIFICANT CHANGE UP (ref 5–17)
AST SERPL-CCNC: 10 U/L — SIGNIFICANT CHANGE UP (ref 10–40)
BILIRUB SERPL-MCNC: 0.6 MG/DL — SIGNIFICANT CHANGE UP (ref 0.2–1.2)
BUN SERPL-MCNC: 21 MG/DL — HIGH (ref 7–18)
CALCIUM SERPL-MCNC: 8.3 MG/DL — LOW (ref 8.4–10.5)
CHLORIDE SERPL-SCNC: 99 MMOL/L — SIGNIFICANT CHANGE UP (ref 96–108)
CO2 SERPL-SCNC: 33 MMOL/L — HIGH (ref 22–31)
CREAT SERPL-MCNC: 0.44 MG/DL — LOW (ref 0.5–1.3)
GLUCOSE BLDC GLUCOMTR-MCNC: 206 MG/DL — HIGH (ref 70–99)
GLUCOSE BLDC GLUCOMTR-MCNC: 215 MG/DL — HIGH (ref 70–99)
GLUCOSE BLDC GLUCOMTR-MCNC: 228 MG/DL — HIGH (ref 70–99)
GLUCOSE SERPL-MCNC: 243 MG/DL — HIGH (ref 70–99)
HCT VFR BLD CALC: 38.2 % — LOW (ref 39–50)
HGB BLD-MCNC: 12 G/DL — LOW (ref 13–17)
MAGNESIUM SERPL-MCNC: 2.3 MG/DL — SIGNIFICANT CHANGE UP (ref 1.6–2.6)
MCHC RBC-ENTMCNC: 28.3 PG — SIGNIFICANT CHANGE UP (ref 27–34)
MCHC RBC-ENTMCNC: 31.4 GM/DL — LOW (ref 32–36)
MCV RBC AUTO: 90.1 FL — SIGNIFICANT CHANGE UP (ref 80–100)
NRBC # BLD: 0 /100 WBCS — SIGNIFICANT CHANGE UP (ref 0–0)
PHOSPHATE SERPL-MCNC: 2.6 MG/DL — SIGNIFICANT CHANGE UP (ref 2.5–4.5)
PLATELET # BLD AUTO: 163 K/UL — SIGNIFICANT CHANGE UP (ref 150–400)
POTASSIUM SERPL-MCNC: 4.4 MMOL/L — SIGNIFICANT CHANGE UP (ref 3.5–5.3)
POTASSIUM SERPL-SCNC: 4.4 MMOL/L — SIGNIFICANT CHANGE UP (ref 3.5–5.3)
PROT SERPL-MCNC: 5.3 G/DL — LOW (ref 6–8.3)
RBC # BLD: 4.24 M/UL — SIGNIFICANT CHANGE UP (ref 4.2–5.8)
RBC # FLD: 13.9 % — SIGNIFICANT CHANGE UP (ref 10.3–14.5)
SODIUM SERPL-SCNC: 137 MMOL/L — SIGNIFICANT CHANGE UP (ref 135–145)
WBC # BLD: 9.35 K/UL — SIGNIFICANT CHANGE UP (ref 3.8–10.5)
WBC # FLD AUTO: 9.35 K/UL — SIGNIFICANT CHANGE UP (ref 3.8–10.5)

## 2021-07-29 RX ORDER — NICOTINE POLACRILEX 2 MG
1 GUM BUCCAL DAILY
Refills: 0 | Status: DISCONTINUED | OUTPATIENT
Start: 2021-07-29 | End: 2021-08-02

## 2021-07-29 RX ORDER — INSULIN LISPRO 100/ML
VIAL (ML) SUBCUTANEOUS
Refills: 0 | Status: DISCONTINUED | OUTPATIENT
Start: 2021-07-29 | End: 2021-08-01

## 2021-07-29 RX ORDER — CEFEPIME 1 G/1
2000 INJECTION, POWDER, FOR SOLUTION INTRAMUSCULAR; INTRAVENOUS EVERY 12 HOURS
Refills: 0 | Status: DISCONTINUED | OUTPATIENT
Start: 2021-07-27 | End: 2021-07-30

## 2021-07-29 RX ADMIN — Medication 1 PUFF(S): at 04:38

## 2021-07-29 RX ADMIN — ALBUTEROL 2 PUFF(S): 90 AEROSOL, METERED ORAL at 04:38

## 2021-07-29 RX ADMIN — Medication 1 DROP(S): at 13:48

## 2021-07-29 RX ADMIN — Medication 40 MILLIGRAM(S): at 05:20

## 2021-07-29 RX ADMIN — CEFEPIME 100 MILLIGRAM(S): 1 INJECTION, POWDER, FOR SOLUTION INTRAMUSCULAR; INTRAVENOUS at 06:00

## 2021-07-29 RX ADMIN — Medication 1 PATCH: at 12:05

## 2021-07-29 RX ADMIN — Medication 2: at 11:14

## 2021-07-29 RX ADMIN — Medication 1 DROP(S): at 21:18

## 2021-07-29 RX ADMIN — ALBUTEROL 2 PUFF(S): 90 AEROSOL, METERED ORAL at 10:29

## 2021-07-29 RX ADMIN — POLYETHYLENE GLYCOL 3350 17 GRAM(S): 17 POWDER, FOR SOLUTION ORAL at 11:19

## 2021-07-29 RX ADMIN — ALBUTEROL 2 PUFF(S): 90 AEROSOL, METERED ORAL at 15:14

## 2021-07-29 RX ADMIN — ENOXAPARIN SODIUM 40 MILLIGRAM(S): 100 INJECTION SUBCUTANEOUS at 11:14

## 2021-07-29 RX ADMIN — Medication 1 PUFF(S): at 10:26

## 2021-07-29 RX ADMIN — Medication 1 PATCH: at 19:45

## 2021-07-29 RX ADMIN — Medication 150 MILLIGRAM(S): at 21:13

## 2021-07-29 RX ADMIN — PANTOPRAZOLE SODIUM 40 MILLIGRAM(S): 20 TABLET, DELAYED RELEASE ORAL at 11:18

## 2021-07-29 RX ADMIN — Medication 2: at 05:48

## 2021-07-29 RX ADMIN — Medication 40 MILLIGRAM(S): at 11:18

## 2021-07-29 RX ADMIN — Medication 81 MILLIGRAM(S): at 11:14

## 2021-07-29 RX ADMIN — INSULIN GLARGINE 10 UNIT(S): 100 INJECTION, SOLUTION SUBCUTANEOUS at 21:28

## 2021-07-29 RX ADMIN — CEFEPIME 100 MILLIGRAM(S): 1 INJECTION, POWDER, FOR SOLUTION INTRAMUSCULAR; INTRAVENOUS at 17:31

## 2021-07-29 RX ADMIN — SENNA PLUS 2 TABLET(S): 8.6 TABLET ORAL at 21:13

## 2021-07-29 RX ADMIN — Medication 1 DROP(S): at 05:20

## 2021-07-29 RX ADMIN — CHLORHEXIDINE GLUCONATE 1 APPLICATION(S): 213 SOLUTION TOPICAL at 05:20

## 2021-07-29 RX ADMIN — CEFEPIME 100 MILLIGRAM(S): 1 INJECTION, POWDER, FOR SOLUTION INTRAMUSCULAR; INTRAVENOUS at 05:20

## 2021-07-29 RX ADMIN — Medication 1 PUFF(S): at 21:18

## 2021-07-29 RX ADMIN — Medication 2: at 21:49

## 2021-07-29 RX ADMIN — ALBUTEROL 2 PUFF(S): 90 AEROSOL, METERED ORAL at 21:17

## 2021-07-29 RX ADMIN — RISPERIDONE 1 MILLIGRAM(S): 4 TABLET ORAL at 11:14

## 2021-07-29 RX ADMIN — Medication 2: at 16:35

## 2021-07-29 RX ADMIN — Medication 1 PUFF(S): at 15:14

## 2021-07-29 NOTE — PROGRESS NOTE ADULT - SUBJECTIVE AND OBJECTIVE BOX
Patient is a 61y old  Male who presents with a chief complaint of AHHRF (2021 19:31)    PATIENT IS SEEN AND EXAMINED IN MEDICAL FLOOR.  NGT [    ]    MT [   ]      GT [   ]    ALLERGIES:  No Known Allergies      Daily     Daily Weight in k.9 (2021 07:00)    VITALS:    Vital Signs Last 24 Hrs  T(C): 36.5 (2021 05:00), Max: 37.1 (2021 08:00)  T(F): 97.7 (2021 05:00), Max: 98.8 (2021 08:00)  HR: 93 (2021 06:00) (58 - 103)  BP: 90/59 (2021 06:00) (87/60 - 131/80)  BP(mean): 66 (2021 06:00) (53 - 94)  RR: 19 (2021 06:00) (18 - 31)  SpO2: 92% (2021 06:00) (89% - 99%)    LABS:    CBC Full  -  ( 2021 04:46 )  WBC Count : 9.35 K/uL  RBC Count : 4.24 M/uL  Hemoglobin : 12.0 g/dL  Hematocrit : 38.2 %  Platelet Count - Automated : 163 K/uL  Mean Cell Volume : 90.1 fl  Mean Cell Hemoglobin : 28.3 pg  Mean Cell Hemoglobin Concentration : 31.4 gm/dL  Auto Neutrophil # : x  Auto Lymphocyte # : x  Auto Monocyte # : x  Auto Eosinophil # : x  Auto Basophil # : x  Auto Neutrophil % : x  Auto Lymphocyte % : x  Auto Monocyte % : x  Auto Eosinophil % : x  Auto Basophil % : x          137  |  99  |  21<H>  ----------------------------<  243<H>  4.4   |  33<H>  |  0.44<L>    Ca    8.3<L>      2021 04:46  Phos  2.6       Mg     2.3         TPro  5.3<L>  /  Alb  2.5<L>  /  TBili  0.6  /  DBili  x   /  AST  10  /  ALT  20  /  AlkPhos  48      CAPILLARY BLOOD GLUCOSE      POCT Blood Glucose.: 198 mg/dL (2021 23:05)  POCT Blood Glucose.: 264 mg/dL (2021 17:14)  POCT Blood Glucose.: 248 mg/dL (2021 11:43)    CARDIAC MARKERS ( 2021 06:46 )  0.102 ng/mL / x     / 19 U/L / x     / x          LIVER FUNCTIONS - ( 2021 04:46 )  Alb: 2.5 g/dL / Pro: 5.3 g/dL / ALK PHOS: 48 U/L / ALT: 20 U/L DA / AST: 10 U/L / GGT: x           Creatinine Trend: 0.44<--, 0.51<--, 0.52<--, 0.55<--, 0.42<--, 0.56<--  I&O's Summary    2021 07:01  -  2021 07:00  --------------------------------------------------------  IN: 1025.8 mL / OUT: 1035 mL / NET: -9.2 mL    2021 07:01  -  2021 06:42  --------------------------------------------------------  IN: 601.8 mL / OUT: 3155 mL / NET: -2553.2 mL        ABG - ( 2021 03:56 )  pH, Arterial: 7.43  pH, Blood: x     /  pCO2: 51    /  pO2: 67    / HCO3: 34    / Base Excess: 8.0   /  SaO2: 94                      MEDICATIONS:    MEDICATIONS  (STANDING):  ALBUTerol    90 MICROgram(s) HFA Inhaler 2 Puff(s) Inhalation every 6 hours  artificial  tears Solution 1 Drop(s) Both EYES three times a day  aspirin  chewable 81 milliGRAM(s) Oral daily  cefepime   IVPB 2000 milliGRAM(s) IV Intermittent every 12 hours  cefepime   IVPB      chlorhexidine 2% Cloths 1 Application(s) Topical <User Schedule>  dextrose 50% Injectable 12.5 Gram(s) IV Push once  enoxaparin Injectable 40 milliGRAM(s) SubCutaneous daily  insulin glargine Injectable (LANTUS) 10 Unit(s) SubCutaneous at bedtime  insulin lispro (ADMELOG) corrective regimen sliding scale   SubCutaneous every 6 hours  ipratropium 17 MICROgram(s) HFA Inhaler 1 Puff(s) Inhalation every 6 hours  methylPREDNISolone sodium succinate Injectable 40 milliGRAM(s) IV Push three times a day  pantoprazole  Injectable 40 milliGRAM(s) IV Push daily  polyethylene glycol 3350 17 Gram(s) Oral daily  risperiDONE   Tablet 1 milliGRAM(s) Oral daily  senna 2 Tablet(s) Oral at bedtime  traZODone 150 milliGRAM(s) Oral at bedtime      MEDICATIONS  (PRN):  sodium chloride 0.9% lock flush 10 milliLiter(s) IV Push every 1 hour PRN Pre/post blood products, medications, blood draw, and to maintain line patency      REVIEW OF SYSTEMS:                           ALL ROS DONE [ X   ]    CONSTITUTIONAL:  LETHARGIC [   ], FEVER [   ], UNRESPONSIVE [   ]  CVS:  CP  [   ], SOB, [   ], PALPITATIONS [   ], DIZZYNESS [   ]  RS: COUGH [   ], SPUTUM [   ]  GI: ABDOMINAL PAIN [   ], NAUSEA [   ], VOMITINGS [   ], DIARRHEA [   ], CONSTIPATION [   ]  :  DYSURIA [   ], NOCTURIA [   ], INCREASED FREQUENCY [   ], DRIBLING [   ],  SKELETAL: PAINFUL JOINTS [   ], SWOLLEN JOINTS [   ], NECK ACHE [   ], LOW BACK ACHE [   ],  SKIN : ULCERS [   ], RASH [   ], ITCHING [   ]  CNS: HEAD ACHE [   ], DOUBLE VISION [   ], BLURRED VISION [   ], AMS / CONFUSION [   ], SEIZURES [   ], WEAKNESS [   ],TINGLING / NUMBNESS [   ]    PHYSICAL EXAMINATION:  GENERAL APPEARANCE: NO DISTRESS  HEENT:  NO PALLOR, NO  JVD,  NO   NODES, NECK SUPPLE  CVS: S1 +, S2 +,   RS: AEEB,  OCCASIONAL  RALES +,   RHONCHI, ON NRB +  ABD: SOFT, NT, NO, BS +    NGTUBE+  EXT: NO PE  SKIN: WARM,   CVC+  SKELETAL:  ROM ACCEPTABLE, LEFT ANKLE   CNS:  AAO X 0-1, NO  DEFICITS    RADIOLOGY :    EXAM:  XR CHEST PORTABLE URGENT 1V                            PROCEDURE DATE:  2021          INTERPRETATION:  History: Shortness of breath. Difficulty breathing.    FINDINGS: Frontal chest.    COMPARISON: 2021.    Exam is limited due to collimation at the lung bases.    Heart size may be exaggerated by AP technique. Mild bronchovascular crowding at the bases. There is no focal lung consolidation or sizable pleural effusion. No significant osseous abnormality. Degenerative change of the spine.    IMPRESSION:    Unremarkable frontal chest.    ==================================================    EXAM:  XR ANKLE COMP MIN 3 VIEWS LT                            PROCEDURE DATE:  2021          INTERPRETATION:  History: Fall, ankle pain.    FINDINGS: Frontal, lateral and oblique projections of the left ankle.    Diffuse soft tissue swelling. Osteopenia. No fracture or dislocation. Ankle mortise is aligned. Extensive vascular calcification.    IMPRESSION:    No fracture or dislocation left ankle appreciated.      ASSESSMENT :       PLAN:  HPI:  61 yo M with PMH including HTN, HLD, DM, CHF, CAD (w stents), COPD on 2.5L home 02 and night BIPAP ( 35%) presents from [facility] for shortness of breath. When EMS arrived pt was noted to be in respiratory distress and his portable O2 machine was uncharged and not running. Pt endorsed to ED physician his SOB has worsened overnight after being discharged 1 day PTA. Off note Pt was here last night for left ankle pain, He was diagnosed with non-displaced left lateral malleolus fracture. Pt was noted to be tachycardic and sob but refused admission and discharged with cast. Pt was Ax0 x3 in ED but Upon my examination pt was lethargic and unable to answer any questions.  (2021 09:56)    # ACUTE ON CHRONIC HYPOXIC HYPERCAPNEIC RESPIRATORY FAILURE, EMPHYSEMA - S/P INTUBATION, ON SOLUMEDROL, ON DUONEB  - ON NRB  - STARTED ON CEFEPIME  # MORBIDLY OBESE WITH RESTRICTIVE LUNG DISEASE, SUSPECT OBSTRUCTIVE SLEEP APNOEA , COPD ON O2 NC AT BASELINE  # NONDISPLACED LEFT LATERAL MALLEOLUS FRACTURE - PODIATRY CONSULT  # AMS - SUSPECT S/T HYPERCAPNEA   # HTN  # HLD  # CHRONIC CHF  # CAD S/P STENT  # DM  # TOBACCO ABUSE  #  GI AND DVT PROPHYLAXIS    NAEEM CABELLO MD COVERING ADRIANA CABELLO MD     Patient is a 61y old  Male who presents with a chief complaint of AHHRF (2021 19:31)    PATIENT IS SEEN AND EXAMINED IN MEDICAL FLOOR.    ALLERGIES:  No Known Allergies      Daily     Daily Weight in k.9 (2021 07:00)    VITALS:    Vital Signs Last 24 Hrs  T(C): 36.5 (2021 05:00), Max: 37.1 (2021 08:00)  T(F): 97.7 (2021 05:00), Max: 98.8 (2021 08:00)  HR: 93 (2021 06:00) (58 - 103)  BP: 90/59 (2021 06:00) (87/60 - 131/80)  BP(mean): 66 (2021 06:00) (53 - 94)  RR: 19 (2021 06:00) (18 - 31)  SpO2: 92% (2021 06:00) (89% - 99%)    LABS:    CBC Full  -  ( 2021 04:46 )  WBC Count : 9.35 K/uL  RBC Count : 4.24 M/uL  Hemoglobin : 12.0 g/dL  Hematocrit : 38.2 %  Platelet Count - Automated : 163 K/uL  Mean Cell Volume : 90.1 fl  Mean Cell Hemoglobin : 28.3 pg  Mean Cell Hemoglobin Concentration : 31.4 gm/dL  Auto Neutrophil # : x  Auto Lymphocyte # : x  Auto Monocyte # : x  Auto Eosinophil # : x  Auto Basophil # : x  Auto Neutrophil % : x  Auto Lymphocyte % : x  Auto Monocyte % : x  Auto Eosinophil % : x  Auto Basophil % : x          137  |  99  |  21<H>  ----------------------------<  243<H>  4.4   |  33<H>  |  0.44<L>    Ca    8.3<L>      2021 04:46  Phos  2.6       Mg     2.3         TPro  5.3<L>  /  Alb  2.5<L>  /  TBili  0.6  /  DBili  x   /  AST  10  /  ALT  20  /  AlkPhos  48      CAPILLARY BLOOD GLUCOSE      POCT Blood Glucose.: 198 mg/dL (2021 23:05)  POCT Blood Glucose.: 264 mg/dL (2021 17:14)  POCT Blood Glucose.: 248 mg/dL (2021 11:43)    CARDIAC MARKERS ( 2021 06:46 )  0.102 ng/mL / x     / 19 U/L / x     / x          LIVER FUNCTIONS - ( 2021 04:46 )  Alb: 2.5 g/dL / Pro: 5.3 g/dL / ALK PHOS: 48 U/L / ALT: 20 U/L DA / AST: 10 U/L / GGT: x           Creatinine Trend: 0.44<--, 0.51<--, 0.52<--, 0.55<--, 0.42<--, 0.56<--  I&O's Summary    2021 07:01  -  2021 07:00  --------------------------------------------------------  IN: 1025.8 mL / OUT: 1035 mL / NET: -9.2 mL    2021 07:01  -  2021 06:42  --------------------------------------------------------  IN: 601.8 mL / OUT: 3155 mL / NET: -2553.2 mL        ABG - ( 2021 03:56 )  pH, Arterial: 7.43  pH, Blood: x     /  pCO2: 51    /  pO2: 67    / HCO3: 34    / Base Excess: 8.0   /  SaO2: 94                      MEDICATIONS:    MEDICATIONS  (STANDING):  ALBUTerol    90 MICROgram(s) HFA Inhaler 2 Puff(s) Inhalation every 6 hours  artificial  tears Solution 1 Drop(s) Both EYES three times a day  aspirin  chewable 81 milliGRAM(s) Oral daily  cefepime   IVPB 2000 milliGRAM(s) IV Intermittent every 12 hours  cefepime   IVPB      chlorhexidine 2% Cloths 1 Application(s) Topical <User Schedule>  dextrose 50% Injectable 12.5 Gram(s) IV Push once  enoxaparin Injectable 40 milliGRAM(s) SubCutaneous daily  insulin glargine Injectable (LANTUS) 10 Unit(s) SubCutaneous at bedtime  insulin lispro (ADMELOG) corrective regimen sliding scale   SubCutaneous every 6 hours  ipratropium 17 MICROgram(s) HFA Inhaler 1 Puff(s) Inhalation every 6 hours  methylPREDNISolone sodium succinate Injectable 40 milliGRAM(s) IV Push three times a day  pantoprazole  Injectable 40 milliGRAM(s) IV Push daily  polyethylene glycol 3350 17 Gram(s) Oral daily  risperiDONE   Tablet 1 milliGRAM(s) Oral daily  senna 2 Tablet(s) Oral at bedtime  traZODone 150 milliGRAM(s) Oral at bedtime      MEDICATIONS  (PRN):  sodium chloride 0.9% lock flush 10 milliLiter(s) IV Push every 1 hour PRN Pre/post blood products, medications, blood draw, and to maintain line patency      REVIEW OF SYSTEMS:                           ALL ROS DONE [ X   ]    CONSTITUTIONAL:  LETHARGIC [   ], FEVER [   ], UNRESPONSIVE [   ]  CVS:  CP  [   ], SOB, [   ], PALPITATIONS [   ], DIZZYNESS [   ]  RS: COUGH [   ], SPUTUM [   ]  GI: ABDOMINAL PAIN [   ], NAUSEA [   ], VOMITINGS [   ], DIARRHEA [   ], CONSTIPATION [   ]  :  DYSURIA [   ], NOCTURIA [   ], INCREASED FREQUENCY [   ], DRIBLING [   ],  SKELETAL: PAINFUL JOINTS [   ], SWOLLEN JOINTS [   ], NECK ACHE [   ], LOW BACK ACHE [   ],  SKIN : ULCERS [   ], RASH [   ], ITCHING [   ]  CNS: HEAD ACHE [   ], DOUBLE VISION [   ], BLURRED VISION [   ], AMS / CONFUSION [   ], SEIZURES [   ], WEAKNESS [   ],TINGLING / NUMBNESS [   ]    PHYSICAL EXAMINATION:  GENERAL APPEARANCE: NO DISTRESS  HEENT:  NO PALLOR, NO  JVD,  NO   NODES, NECK SUPPLE  CVS: S1 +, S2 +,   RS: AEEB,  OCCASIONAL  RALES +,   RHONCHI, ON VENTIMASK  ABD: SOFT, NT, NO, BS +    NGTUBE+  EXT: NO PE  SKIN: WARM,   CVC+  SKELETAL:  ROM ACCEPTABLE, LEFT ANKLE   CNS:  AAO X 2-3, NO  DEFICITS    RADIOLOGY :    EXAM:  XR CHEST PORTABLE URGENT 1V                            PROCEDURE DATE:  2021          INTERPRETATION:  History: Shortness of breath. Difficulty breathing.    FINDINGS: Frontal chest.    COMPARISON: 2021.    Exam is limited due to collimation at the lung bases.    Heart size may be exaggerated by AP technique. Mild bronchovascular crowding at the bases. There is no focal lung consolidation or sizable pleural effusion. No significant osseous abnormality. Degenerative change of the spine.    IMPRESSION:    Unremarkable frontal chest.    ==================================================    EXAM:  XR ANKLE COMP MIN 3 VIEWS LT                            PROCEDURE DATE:  2021          INTERPRETATION:  History: Fall, ankle pain.    FINDINGS: Frontal, lateral and oblique projections of the left ankle.    Diffuse soft tissue swelling. Osteopenia. No fracture or dislocation. Ankle mortise is aligned. Extensive vascular calcification.    IMPRESSION:    No fracture or dislocation left ankle appreciated.      ASSESSMENT :       PLAN:  HPI:  61 yo M with PMH including HTN, HLD, DM, CHF, CAD (w stents), COPD on 2.5L home 02 and night BIPAP ( 35%) presents from [facility] for shortness of breath. When EMS arrived pt was noted to be in respiratory distress and his portable O2 machine was uncharged and not running. Pt endorsed to ED physician his SOB has worsened overnight after being discharged 1 day PTA. Off note Pt was here last night for left ankle pain, He was diagnosed with non-displaced left lateral malleolus fracture. Pt was noted to be tachycardic and sob but refused admission and discharged with cast. Pt was Ax0 x3 in ED but Upon my examination pt was lethargic and unable to answer any questions.  (2021 09:56)    # ACUTE ON CHRONIC HYPOXIC HYPERCAPNEIC RESPIRATORY FAILURE, EMPHYSEMA - S/P INTUBATION, ON SOLUMEDROL [TAPERING], ON DUONEB  - ON VENTURI MASK - TRY TO WEAN TO NC   - STARTED ON CEFEPIME  # MORBIDLY OBESE WITH RESTRICTIVE LUNG DISEASE, SUSPECT OBSTRUCTIVE SLEEP APNOEA , COPD ON O2 NC AT BASELINE  # NONDISPLACED LEFT LATERAL MALLEOLUS FRACTURE - PODIATRY CONSULT  # AMS - SUSPECT S/T HYPERCAPNEA - RESOLVED  # HTN  # HLD  # CHRONIC CHF  # CAD S/P STENT  # DM  # TOBACCO ABUSE  #  GI AND DVT PROPHYLAXIS    NAEEM CABELLO MD COVERING ADRIANA CABELLO MD

## 2021-07-29 NOTE — PROGRESS NOTE ADULT - SUBJECTIVE AND OBJECTIVE BOX
INTERVAL HPI/OVERNIGHT EVENTS: ***    PRESSORS: [ ] YES [ ] NO  WHICH:      Antimicrobial:  cefepime   IVPB 2000 milliGRAM(s) IV Intermittent every 12 hours  cefepime   IVPB        Cardiovascular:    Pulmonary:  ALBUTerol    90 MICROgram(s) HFA Inhaler 2 Puff(s) Inhalation every 6 hours  ipratropium 17 MICROgram(s) HFA Inhaler 1 Puff(s) Inhalation every 6 hours    Hematalogic:  aspirin  chewable 81 milliGRAM(s) Oral daily  enoxaparin Injectable 40 milliGRAM(s) SubCutaneous daily    Other:  artificial  tears Solution 1 Drop(s) Both EYES three times a day  chlorhexidine 2% Cloths 1 Application(s) Topical <User Schedule>  dextrose 50% Injectable 12.5 Gram(s) IV Push once  insulin glargine Injectable (LANTUS) 10 Unit(s) SubCutaneous at bedtime  insulin lispro (ADMELOG) corrective regimen sliding scale   SubCutaneous every 6 hours  pantoprazole  Injectable 40 milliGRAM(s) IV Push daily  polyethylene glycol 3350 17 Gram(s) Oral daily  predniSONE   Tablet 40 milliGRAM(s) Oral daily  risperiDONE   Tablet 1 milliGRAM(s) Oral daily  senna 2 Tablet(s) Oral at bedtime  sodium chloride 0.9% lock flush 10 milliLiter(s) IV Push every 1 hour PRN  traZODone 150 milliGRAM(s) Oral at bedtime    ALBUTerol    90 MICROgram(s) HFA Inhaler 2 Puff(s) Inhalation every 6 hours  artificial  tears Solution 1 Drop(s) Both EYES three times a day  aspirin  chewable 81 milliGRAM(s) Oral daily  cefepime   IVPB 2000 milliGRAM(s) IV Intermittent every 12 hours  cefepime   IVPB      chlorhexidine 2% Cloths 1 Application(s) Topical <User Schedule>  dextrose 50% Injectable 12.5 Gram(s) IV Push once  enoxaparin Injectable 40 milliGRAM(s) SubCutaneous daily  insulin glargine Injectable (LANTUS) 10 Unit(s) SubCutaneous at bedtime  insulin lispro (ADMELOG) corrective regimen sliding scale   SubCutaneous every 6 hours  ipratropium 17 MICROgram(s) HFA Inhaler 1 Puff(s) Inhalation every 6 hours  pantoprazole  Injectable 40 milliGRAM(s) IV Push daily  polyethylene glycol 3350 17 Gram(s) Oral daily  predniSONE   Tablet 40 milliGRAM(s) Oral daily  risperiDONE   Tablet 1 milliGRAM(s) Oral daily  senna 2 Tablet(s) Oral at bedtime  sodium chloride 0.9% lock flush 10 milliLiter(s) IV Push every 1 hour PRN  traZODone 150 milliGRAM(s) Oral at bedtime    Drug Dosing Weight  Height (cm): 172.7 (25 Jul 2021 07:05)  Weight (kg): 93.5 (25 Jul 2021 10:35)  BMI (kg/m2): 31.3 (25 Jul 2021 10:35)  BSA (m2): 2.07 (25 Jul 2021 10:35)    CENTRAL LINE: [ ] YES [ ] NO  LOCATION:         AMOR: [ ] YES [ ] NO          A-LINE:  [ ] YES [ ] NO  LOCATION:             ICU Vital Signs Last 24 Hrs  T(C): 37.1 (29 Jul 2021 08:00), Max: 37.1 (29 Jul 2021 08:00)  T(F): 98.7 (29 Jul 2021 08:00), Max: 98.7 (29 Jul 2021 08:00)  HR: 101 (29 Jul 2021 10:00) (60 - 103)  BP: 108/61 (29 Jul 2021 10:00) (87/60 - 124/64)  BP(mean): 69 (29 Jul 2021 10:00) (53 - 78)  ABP: 126/63 (29 Jul 2021 10:00) (91/48 - 142/72)  ABP(mean): 81 (29 Jul 2021 10:00) (62 - 97)  RR: 24 (29 Jul 2021 10:00) (18 - 29)  SpO2: 92% (29 Jul 2021 10:00) (89% - 96%)      ABG - ( 28 Jul 2021 03:56 )  pH, Arterial: 7.43  pH, Blood: x     /  pCO2: 51    /  pO2: 67    / HCO3: 34    / Base Excess: 8.0   /  SaO2: 94                    07-28 @ 07:01  -  07-29 @ 07:00  --------------------------------------------------------  IN: 631.8 mL / OUT: 3215 mL / NET: -2583.2 mL        Mode: standby      REVIEW OF SYSTEMS:    CONSTITUTIONAL: No weakness, fevers or chills  NECK: No pain or stiffness  RESPIRATORY: No cough, wheezing, hemoptysis; No shortness of breath  CARDIOVASCULAR: No chest pain or palpitations  GASTROINTESTINAL: No abdominal or epigastric pain. No nausea, vomiting, No diarrhea or constipation. No melena or hematochezia.  GENITOURINARY: No dysuria, frequency or hematuria  NEUROLOGICAL: No numbness or weakness  All other review of systems is negative unless indicated above      PHYSICAL EXAM:    GENERAL: NAD, well-groomed, well-developed  EYES: EOMI, PERRLA,   NECK: Supple, No JVD; Normal thyroid; Trachea midline  NERVOUS SYSTEM:  Alert & Oriented X3,  Motor Strength 5/5 B/L upper and lower extremities; DTRs 2+ intact and symmetric  CHEST/LUNG: No rales, rhonchi, wheezing   HEART: Regular rate and rhythm; No murmurs,   ABDOMEN: Soft, Nontender, Nondistended; Bowel sounds present  EXTREMITIES:  2+ Peripheral Pulses, No clubbing, cyanosis, or edema        LABS:  CBC Full  -  ( 29 Jul 2021 04:46 )  WBC Count : 9.35 K/uL  RBC Count : 4.24 M/uL  Hemoglobin : 12.0 g/dL  Hematocrit : 38.2 %  Platelet Count - Automated : 163 K/uL  Mean Cell Volume : 90.1 fl  Mean Cell Hemoglobin : 28.3 pg  Mean Cell Hemoglobin Concentration : 31.4 gm/dL  Auto Neutrophil # : x  Auto Lymphocyte # : x  Auto Monocyte # : x  Auto Eosinophil # : x  Auto Basophil # : x  Auto Neutrophil % : x  Auto Lymphocyte % : x  Auto Monocyte % : x  Auto Eosinophil % : x  Auto Basophil % : x    07-29    137  |  99  |  21<H>  ----------------------------<  243<H>  4.4   |  33<H>  |  0.44<L>    Ca    8.3<L>      29 Jul 2021 04:46  Phos  2.6     07-29  Mg     2.3     07-29    TPro  5.3<L>  /  Alb  2.5<L>  /  TBili  0.6  /  DBili  x   /  AST  10  /  ALT  20  /  AlkPhos  48  07-29            RADIOLOGY & ADDITIONAL STUDIES REVIEWED:  ***    [ ]GOALS OF CARE DISCUSSION WITH PATIENT/FAMILY/PROXY:    CRITICAL CARE TIME SPENT: 35 minutes INTERVAL HPI/OVERNIGHT EVENTS: Refused BIPAP overnight    PRESSORS: [ ] YES [x ] NO  WHICH:      Antimicrobial:  cefepime   IVPB 2000 milliGRAM(s) IV Intermittent every 12 hours  cefepime   IVPB        Cardiovascular:    Pulmonary:  ALBUTerol    90 MICROgram(s) HFA Inhaler 2 Puff(s) Inhalation every 6 hours  ipratropium 17 MICROgram(s) HFA Inhaler 1 Puff(s) Inhalation every 6 hours    Hematalogic:  aspirin  chewable 81 milliGRAM(s) Oral daily  enoxaparin Injectable 40 milliGRAM(s) SubCutaneous daily    Other:  artificial  tears Solution 1 Drop(s) Both EYES three times a day  chlorhexidine 2% Cloths 1 Application(s) Topical <User Schedule>  dextrose 50% Injectable 12.5 Gram(s) IV Push once  insulin glargine Injectable (LANTUS) 10 Unit(s) SubCutaneous at bedtime  insulin lispro (ADMELOG) corrective regimen sliding scale   SubCutaneous every 6 hours  pantoprazole  Injectable 40 milliGRAM(s) IV Push daily  polyethylene glycol 3350 17 Gram(s) Oral daily  predniSONE   Tablet 40 milliGRAM(s) Oral daily  risperiDONE   Tablet 1 milliGRAM(s) Oral daily  senna 2 Tablet(s) Oral at bedtime  sodium chloride 0.9% lock flush 10 milliLiter(s) IV Push every 1 hour PRN  traZODone 150 milliGRAM(s) Oral at bedtime    ALBUTerol    90 MICROgram(s) HFA Inhaler 2 Puff(s) Inhalation every 6 hours  artificial  tears Solution 1 Drop(s) Both EYES three times a day  aspirin  chewable 81 milliGRAM(s) Oral daily  cefepime   IVPB 2000 milliGRAM(s) IV Intermittent every 12 hours  cefepime   IVPB      chlorhexidine 2% Cloths 1 Application(s) Topical <User Schedule>  dextrose 50% Injectable 12.5 Gram(s) IV Push once  enoxaparin Injectable 40 milliGRAM(s) SubCutaneous daily  insulin glargine Injectable (LANTUS) 10 Unit(s) SubCutaneous at bedtime  insulin lispro (ADMELOG) corrective regimen sliding scale   SubCutaneous every 6 hours  ipratropium 17 MICROgram(s) HFA Inhaler 1 Puff(s) Inhalation every 6 hours  pantoprazole  Injectable 40 milliGRAM(s) IV Push daily  polyethylene glycol 3350 17 Gram(s) Oral daily  predniSONE   Tablet 40 milliGRAM(s) Oral daily  risperiDONE   Tablet 1 milliGRAM(s) Oral daily  senna 2 Tablet(s) Oral at bedtime  sodium chloride 0.9% lock flush 10 milliLiter(s) IV Push every 1 hour PRN  traZODone 150 milliGRAM(s) Oral at bedtime    Drug Dosing Weight  Height (cm): 172.7 (25 Jul 2021 07:05)  Weight (kg): 93.5 (25 Jul 2021 10:35)  BMI (kg/m2): 31.3 (25 Jul 2021 10:35)  BSA (m2): 2.07 (25 Jul 2021 10:35)    CENTRAL LINE: [x ] YES [ ] NO  LOCATION:  [facility]       AMOR: [x ] YES [ ] NO          A-LINE:  [x ] YES [ ] NO  LOCATION:  Right radial           ICU Vital Signs Last 24 Hrs  T(C): 37.1 (29 Jul 2021 08:00), Max: 37.1 (29 Jul 2021 08:00)  T(F): 98.7 (29 Jul 2021 08:00), Max: 98.7 (29 Jul 2021 08:00)  HR: 101 (29 Jul 2021 10:00) (60 - 103)  BP: 108/61 (29 Jul 2021 10:00) (87/60 - 124/64)  BP(mean): 69 (29 Jul 2021 10:00) (53 - 78)  ABP: 126/63 (29 Jul 2021 10:00) (91/48 - 142/72)  ABP(mean): 81 (29 Jul 2021 10:00) (62 - 97)  RR: 24 (29 Jul 2021 10:00) (18 - 29)  SpO2: 92% (29 Jul 2021 10:00) (89% - 96%)      ABG - ( 28 Jul 2021 03:56 )  pH, Arterial: 7.43  pH, Blood: x     /  pCO2: 51    /  pO2: 67    / HCO3: 34    / Base Excess: 8.0   /  SaO2: 94                07-28 @ 07:01  -  07-29 @ 07:00  --------------------------------------------------------  IN: 631.8 mL / OUT: 3215 mL / NET: -2583.2 mL        Mode: standby      REVIEW OF SYSTEMS:    CONSTITUTIONAL: No weakness, fevers or chills  NECK: No pain or stiffness. Central line is bothering  RESPIRATORY: No cough, wheezing, hemoptysis; No shortness of breath  CARDIOVASCULAR: No chest pain or palpitations  GASTROINTESTINAL: No abdominal or epigastric pain. No nausea, vomiting, Constipation since arrival. No melena or hematochezia.  GENITOURINARY: No dysuria, frequency or hematuria  NEUROLOGICAL: No numbness or weakness  All other review of systems is negative unless indicated above      PHYSICAL EXAM:    GENERAL: NAD  EYES: EOMI, PERRLA,   NECK: Supple, No JVD; Normal thyroid; Trachea midline  NERVOUS SYSTEM:  Alert & Oriented X3,  Motor Strength 5/5 B/L upper and lower extremities; DTRs 2+ intact and symmetric  CHEST/LUNG: No rales, rhonchi, wheezing  HEART: Regular rate and rhythm; No murmurs, distant heart sounds  ABDOMEN: Soft, Nontender, Nondistended; Bowel sounds present  EXTREMITIES:  2+ Peripheral Pulses, No clubbing, cyanosis, or edema        LABS:  CBC Full  -  ( 29 Jul 2021 04:46 )  WBC Count : 9.35 K/uL  RBC Count : 4.24 M/uL  Hemoglobin : 12.0 g/dL  Hematocrit : 38.2 %  Platelet Count - Automated : 163 K/uL  Mean Cell Volume : 90.1 fl  Mean Cell Hemoglobin : 28.3 pg  Mean Cell Hemoglobin Concentration : 31.4 gm/dL  Auto Neutrophil # : x  Auto Lymphocyte # : x  Auto Monocyte # : x  Auto Eosinophil # : x  Auto Basophil # : x  Auto Neutrophil % : x  Auto Lymphocyte % : x  Auto Monocyte % : x  Auto Eosinophil % : x  Auto Basophil % : x    07-29    137  |  99  |  21<H>  ----------------------------<  243<H>  4.4   |  33<H>  |  0.44<L>    Ca    8.3<L>      29 Jul 2021 04:46  Phos  2.6     07-29  Mg     2.3     07-29    TPro  5.3<L>  /  Alb  2.5<L>  /  TBili  0.6  /  DBili  x   /  AST  10  /  ALT  20  /  AlkPhos  48  07-29            RADIOLOGY & ADDITIONAL STUDIES REVIEWED:  ***    [ ]GOALS OF CARE DISCUSSION WITH PATIENT/FAMILY/PROXY:    CRITICAL CARE TIME SPENT: 35 minutes

## 2021-07-29 NOTE — PROGRESS NOTE ADULT - ASSESSMENT
59 yo M with PMH including HTN, HLD, DM, CHF, CAD (w stents), COPD on 2.5L home 02 and night BIPAP ( 35%) presents from [facility] for shortness of breath. ICU was consulted and admitted pt for Acute on chronic hypercapnic respiratory failure requiring intubation       Neuro:  Intubated and sedated w/ prop and precedex  hold sedation  SBT and possible extubation       Cardiovascular:  Patient has h/o CHF, on lasix at home  Does not look fluid overloaded, not in CHF exacerbation  will hold Lasix for now       Pulmonary:   Acute on chronic Hypoxic, Hypercapnic Respiratory failure   ETT   hx of multiple exacerbations, >2 in past year, class D COPD   c/w IV solumedrol 40 q8h, de-escalate as needed   Started on cefepime b/c thick secretions   CXR and ABG qd  SBT and possible extubation    Infectious Diseases:  no active issues     Gastrointestinal:  TF    Renal:  no active issues     Endo:   Has h/o DM  B  Increase lantus to 8  c/w sliding scale    Heme/onc:   mild leukocytosis; likely reactive   will monitor for now     thrombocytopenia   plt < 140   184 on   ctm    Skin/ catheter: 2 peripheral IV, RRAL, RIJ    Prophylaxis: lovenox qd     Goals of Care: Full code     Dispo: ICU          61 yo M with PMH including HTN, HLD, DM, CHF, CAD (w stents), COPD on 2.5L home 02 and night BIPAP ( 35%) presents from [facility] for shortness of breath. ICU was consulted and admitted pt for Acute on chronic hypercapnic respiratory failure requiring intubation       Neuro:  No issues     Cardiovascular:  Patient has h/o CHF, on lasix at home  Does not look fluid overloaded, not in CHF exacerbation  will hold Lasix for now     Pulmonary:   Acute on chronic Hypoxic, Hypercapnic Respiratory failure   ETT   Extubated   non-compliant with BIPAP  hx of multiple exacerbations, >2 in past year, class D COPD   Taper steroids, prednisone   c/w cefepime b/c thick secretions  albuterol and ipratropium inhalation therapy  PT and OOB  remove lines    Infectious Diseases:  no active issues     Gastrointestinal:  Soft DASH diet    Renal:  no active issues     Endo:   Has h/o DM  B  Increase lantus to 8  c/w sliding scale    Heme/onc:   mild leukocytosis; likely reactive   will monitor for now     thrombocytopenia   plt < 140   184 on   ctm    Skin/ catheter: 2 peripheral IV, RRAL, RIJ    Prophylaxis: lovenox qd     Goals of Care: Full code     Dispo: Transfer to medicine

## 2021-07-30 DIAGNOSIS — J44.9 CHRONIC OBSTRUCTIVE PULMONARY DISEASE, UNSPECIFIED: ICD-10-CM

## 2021-07-30 DIAGNOSIS — R60.0 LOCALIZED EDEMA: ICD-10-CM

## 2021-07-30 DIAGNOSIS — F31.9 BIPOLAR DISORDER, UNSPECIFIED: ICD-10-CM

## 2021-07-30 DIAGNOSIS — E11.9 TYPE 2 DIABETES MELLITUS WITHOUT COMPLICATIONS: ICD-10-CM

## 2021-07-30 DIAGNOSIS — F17.200 NICOTINE DEPENDENCE, UNSPECIFIED, UNCOMPLICATED: ICD-10-CM

## 2021-07-30 DIAGNOSIS — I25.10 ATHEROSCLEROTIC HEART DISEASE OF NATIVE CORONARY ARTERY WITHOUT ANGINA PECTORIS: ICD-10-CM

## 2021-07-30 DIAGNOSIS — Z71.89 OTHER SPECIFIED COUNSELING: ICD-10-CM

## 2021-07-30 DIAGNOSIS — J96.21 ACUTE AND CHRONIC RESPIRATORY FAILURE WITH HYPOXIA: ICD-10-CM

## 2021-07-30 LAB
ANION GAP SERPL CALC-SCNC: 4 MMOL/L — LOW (ref 5–17)
BASE EXCESS BLDA CALC-SCNC: 7.8 MMOL/L — HIGH (ref -2–3)
BASOPHILS # BLD AUTO: 0.01 K/UL — SIGNIFICANT CHANGE UP (ref 0–0.2)
BASOPHILS NFR BLD AUTO: 0.1 % — SIGNIFICANT CHANGE UP (ref 0–2)
BLOOD GAS COMMENTS ARTERIAL: SIGNIFICANT CHANGE UP
BUN SERPL-MCNC: 19 MG/DL — HIGH (ref 7–18)
CALCIUM SERPL-MCNC: 8.2 MG/DL — LOW (ref 8.4–10.5)
CHLORIDE SERPL-SCNC: 104 MMOL/L — SIGNIFICANT CHANGE UP (ref 96–108)
CO2 SERPL-SCNC: 32 MMOL/L — HIGH (ref 22–31)
CREAT SERPL-MCNC: 0.41 MG/DL — LOW (ref 0.5–1.3)
EOSINOPHIL # BLD AUTO: 0.03 K/UL — SIGNIFICANT CHANGE UP (ref 0–0.5)
EOSINOPHIL NFR BLD AUTO: 0.4 % — SIGNIFICANT CHANGE UP (ref 0–6)
GLUCOSE BLDC GLUCOMTR-MCNC: 130 MG/DL — HIGH (ref 70–99)
GLUCOSE BLDC GLUCOMTR-MCNC: 216 MG/DL — HIGH (ref 70–99)
GLUCOSE BLDC GLUCOMTR-MCNC: 227 MG/DL — HIGH (ref 70–99)
GLUCOSE BLDC GLUCOMTR-MCNC: 281 MG/DL — HIGH (ref 70–99)
GLUCOSE BLDC GLUCOMTR-MCNC: 287 MG/DL — HIGH (ref 70–99)
GLUCOSE SERPL-MCNC: 133 MG/DL — HIGH (ref 70–99)
HCO3 BLDA-SCNC: 33 MMOL/L — HIGH (ref 21–28)
HCT VFR BLD CALC: 39.6 % — SIGNIFICANT CHANGE UP (ref 39–50)
HGB BLD-MCNC: 12.2 G/DL — LOW (ref 13–17)
HOROWITZ INDEX BLDA+IHG-RTO: 36 — SIGNIFICANT CHANGE UP
IMM GRANULOCYTES NFR BLD AUTO: 0.6 % — SIGNIFICANT CHANGE UP (ref 0–1.5)
LYMPHOCYTES # BLD AUTO: 1.43 K/UL — SIGNIFICANT CHANGE UP (ref 1–3.3)
LYMPHOCYTES # BLD AUTO: 16.9 % — SIGNIFICANT CHANGE UP (ref 13–44)
MAGNESIUM SERPL-MCNC: 2.3 MG/DL — SIGNIFICANT CHANGE UP (ref 1.6–2.6)
MCHC RBC-ENTMCNC: 27.4 PG — SIGNIFICANT CHANGE UP (ref 27–34)
MCHC RBC-ENTMCNC: 30.8 GM/DL — LOW (ref 32–36)
MCV RBC AUTO: 89 FL — SIGNIFICANT CHANGE UP (ref 80–100)
MONOCYTES # BLD AUTO: 0.65 K/UL — SIGNIFICANT CHANGE UP (ref 0–0.9)
MONOCYTES NFR BLD AUTO: 7.7 % — SIGNIFICANT CHANGE UP (ref 2–14)
NEUTROPHILS # BLD AUTO: 6.29 K/UL — SIGNIFICANT CHANGE UP (ref 1.8–7.4)
NEUTROPHILS NFR BLD AUTO: 74.3 % — SIGNIFICANT CHANGE UP (ref 43–77)
NRBC # BLD: 0 /100 WBCS — SIGNIFICANT CHANGE UP (ref 0–0)
PCO2 BLDA: 49 MMHG — HIGH (ref 35–48)
PH BLDA: 7.44 — SIGNIFICANT CHANGE UP (ref 7.35–7.45)
PHOSPHATE SERPL-MCNC: 3.1 MG/DL — SIGNIFICANT CHANGE UP (ref 2.5–4.5)
PLATELET # BLD AUTO: 169 K/UL — SIGNIFICANT CHANGE UP (ref 150–400)
PO2 BLDA: 83 MMHG — SIGNIFICANT CHANGE UP (ref 83–108)
POTASSIUM SERPL-MCNC: 3.9 MMOL/L — SIGNIFICANT CHANGE UP (ref 3.5–5.3)
POTASSIUM SERPL-SCNC: 3.9 MMOL/L — SIGNIFICANT CHANGE UP (ref 3.5–5.3)
RBC # BLD: 4.45 M/UL — SIGNIFICANT CHANGE UP (ref 4.2–5.8)
RBC # FLD: 13.7 % — SIGNIFICANT CHANGE UP (ref 10.3–14.5)
SAO2 % BLDA: 97 % — SIGNIFICANT CHANGE UP
SODIUM SERPL-SCNC: 140 MMOL/L — SIGNIFICANT CHANGE UP (ref 135–145)
WBC # BLD: 8.46 K/UL — SIGNIFICANT CHANGE UP (ref 3.8–10.5)
WBC # FLD AUTO: 8.46 K/UL — SIGNIFICANT CHANGE UP (ref 3.8–10.5)

## 2021-07-30 RX ORDER — CEFEPIME 1 G/1
2000 INJECTION, POWDER, FOR SOLUTION INTRAMUSCULAR; INTRAVENOUS EVERY 12 HOURS
Refills: 0 | Status: DISCONTINUED | OUTPATIENT
Start: 2021-07-27 | End: 2021-08-02

## 2021-07-30 RX ORDER — ACETAMINOPHEN 500 MG
650 TABLET ORAL ONCE
Refills: 0 | Status: COMPLETED | OUTPATIENT
Start: 2021-07-30 | End: 2021-07-30

## 2021-07-30 RX ORDER — BUDESONIDE AND FORMOTEROL FUMARATE DIHYDRATE 160; 4.5 UG/1; UG/1
2 AEROSOL RESPIRATORY (INHALATION)
Refills: 0 | Status: DISCONTINUED | OUTPATIENT
Start: 2021-07-30 | End: 2021-08-02

## 2021-07-30 RX ORDER — PANTOPRAZOLE SODIUM 20 MG/1
40 TABLET, DELAYED RELEASE ORAL
Refills: 0 | Status: DISCONTINUED | OUTPATIENT
Start: 2021-07-30 | End: 2021-08-02

## 2021-07-30 RX ORDER — OXYCODONE AND ACETAMINOPHEN 5; 325 MG/1; MG/1
1 TABLET ORAL ONCE
Refills: 0 | Status: DISCONTINUED | OUTPATIENT
Start: 2021-07-30 | End: 2021-07-30

## 2021-07-30 RX ADMIN — ALBUTEROL 2 PUFF(S): 90 AEROSOL, METERED ORAL at 15:13

## 2021-07-30 RX ADMIN — Medication 40 MILLIGRAM(S): at 05:01

## 2021-07-30 RX ADMIN — CEFEPIME 100 MILLIGRAM(S): 1 INJECTION, POWDER, FOR SOLUTION INTRAMUSCULAR; INTRAVENOUS at 17:21

## 2021-07-30 RX ADMIN — Medication 3: at 11:58

## 2021-07-30 RX ADMIN — Medication 650 MILLIGRAM(S): at 09:28

## 2021-07-30 RX ADMIN — Medication 1 PUFF(S): at 03:01

## 2021-07-30 RX ADMIN — Medication 1 DROP(S): at 15:13

## 2021-07-30 RX ADMIN — CEFEPIME 100 MILLIGRAM(S): 1 INJECTION, POWDER, FOR SOLUTION INTRAMUSCULAR; INTRAVENOUS at 05:00

## 2021-07-30 RX ADMIN — OXYCODONE AND ACETAMINOPHEN 1 TABLET(S): 5; 325 TABLET ORAL at 23:11

## 2021-07-30 RX ADMIN — Medication 81 MILLIGRAM(S): at 12:19

## 2021-07-30 RX ADMIN — ALBUTEROL 2 PUFF(S): 90 AEROSOL, METERED ORAL at 05:02

## 2021-07-30 RX ADMIN — Medication 1 PATCH: at 21:34

## 2021-07-30 RX ADMIN — ENOXAPARIN SODIUM 40 MILLIGRAM(S): 100 INJECTION SUBCUTANEOUS at 12:19

## 2021-07-30 RX ADMIN — INSULIN GLARGINE 10 UNIT(S): 100 INJECTION, SOLUTION SUBCUTANEOUS at 21:56

## 2021-07-30 RX ADMIN — Medication 1 PUFF(S): at 08:59

## 2021-07-30 RX ADMIN — Medication 650 MILLIGRAM(S): at 11:38

## 2021-07-30 RX ADMIN — Medication 1 PATCH: at 12:19

## 2021-07-30 RX ADMIN — Medication 650 MILLIGRAM(S): at 10:40

## 2021-07-30 RX ADMIN — ALBUTEROL 2 PUFF(S): 90 AEROSOL, METERED ORAL at 21:56

## 2021-07-30 RX ADMIN — BUDESONIDE AND FORMOTEROL FUMARATE DIHYDRATE 2 PUFF(S): 160; 4.5 AEROSOL RESPIRATORY (INHALATION) at 21:56

## 2021-07-30 RX ADMIN — OXYCODONE AND ACETAMINOPHEN 1 TABLET(S): 5; 325 TABLET ORAL at 21:53

## 2021-07-30 RX ADMIN — Medication 1 DROP(S): at 05:01

## 2021-07-30 RX ADMIN — Medication 1 DROP(S): at 21:55

## 2021-07-30 RX ADMIN — Medication 2: at 21:57

## 2021-07-30 RX ADMIN — Medication 1 PATCH: at 11:38

## 2021-07-30 RX ADMIN — Medication 1 PUFF(S): at 21:56

## 2021-07-30 RX ADMIN — Medication 2: at 17:21

## 2021-07-30 RX ADMIN — ALBUTEROL 2 PUFF(S): 90 AEROSOL, METERED ORAL at 08:58

## 2021-07-30 RX ADMIN — Medication 150 MILLIGRAM(S): at 22:00

## 2021-07-30 RX ADMIN — Medication 650 MILLIGRAM(S): at 08:58

## 2021-07-30 RX ADMIN — Medication 1 PUFF(S): at 15:13

## 2021-07-30 RX ADMIN — CHLORHEXIDINE GLUCONATE 1 APPLICATION(S): 213 SOLUTION TOPICAL at 05:01

## 2021-07-30 RX ADMIN — RISPERIDONE 1 MILLIGRAM(S): 4 TABLET ORAL at 12:19

## 2021-07-30 RX ADMIN — POLYETHYLENE GLYCOL 3350 17 GRAM(S): 17 POWDER, FOR SOLUTION ORAL at 12:19

## 2021-07-30 NOTE — CHART NOTE - NSCHARTNOTEFT_GEN_A_CORE
Hospital Course: 61 yo M with PMH including HTN, HLD, DM, CHF, CAD (w stents), COPD on 2.5L home 02 and night BIPAP (12/5 35%) presents from [facility] for shortness of breath. ICU was consulted and admitted pt for Acute on chronic hypercapnic respiratory failure requiring intubation.    ICU Course: The patient arrived on 7/25 to the ICU with severe acute hypoxic respiratory 2/2 COPD exacerbation and altered mental status. He was intubated on arrival for hypoxia and protection of airway. He also became hypotensive and developed thick secretions. A central line was placed and he was started on a levo drip and a course of cefepime. He was extubated on 7/28. He received a course of steroids, and inhalation treatment with albuterol and ipratropium. Patient is intermitently compliant with BIPAP. When not on BIPAP he is on 4 L NC. Central line and arterial line were removed prior to transfer. Tejada was removed and a condom cath placed.    Patient is stable for downgrade to floor under care of Dr. AMELIA Ramos for further management, covering resident ---------- was informed.    Things to follow:  Hemodynamics Hospital Course: 61 yo M with PMH including HTN, HLD, DM, CHF, CAD (w stents), COPD on 2.5L home 02 and night BIPAP (12/5 35%) presents from [facility] for shortness of breath. ICU was consulted and admitted pt for Acute on chronic hypercapnic respiratory failure requiring intubation.    ICU Course: The patient arrived on 7/25 to the ICU with severe acute hypoxic respiratory 2/2 COPD exacerbation and altered mental status. He was intubated on arrival for hypoxia and protection of airway. He also became hypotensive and developed thick secretions. A central line was placed and he was started on a levo drip and a course of cefepime. He was extubated on 7/28. He received a course of steroids, and inhalation treatment with albuterol and ipratropium. Patient is intermittently compliant with BIPAP. When not on BIPAP he is on 4 L NC. Patient is now off pressors. Central line and arterial line were removed prior to transfer. Tejada was removed and a condom cath placed.    Patient is stable for downgrade to floor under care of Dr. AMELIA Ramos for further management, covering resident Dr. Bryan was informed.    Things to follow:  Hemodynamics

## 2021-07-30 NOTE — CHART NOTE - NSCHARTNOTEFT_GEN_A_CORE
EVENT:     SUBJECTIVE:    OBJECTIVE:  Vital Signs Last 24 Hrs  T(C): 37.1 (30 Jul 2021 20:55), Max: 37.1 (30 Jul 2021 13:00)  T(F): 98.7 (30 Jul 2021 20:55), Max: 98.7 (30 Jul 2021 13:00)  HR: 95 (30 Jul 2021 20:55) (75 - 105)  BP: 140/83 (30 Jul 2021 20:55) (113/64 - 140/83)  BP(mean): 85 (30 Jul 2021 09:00) (68 - 90)  RR: 18 (30 Jul 2021 20:55) (8 - 28)  SpO2: 93% (30 Jul 2021 20:55) (90% - 100%)    PHYSICAL EXAM:  Neuro: Awake and alert, oriented to person, place, and time  Cardiovascular: + S1, S2, no murmurs, rubs, or bruits  Respiratory: clear to auscultation bilaterally with good air entry   GI: Abdomen soft, non-tender, bowel sounds present   : Non distended;   Skin: warm and dry; no rash      LABS:                        12.2   8.46  )-----------( 169      ( 30 Jul 2021 05:37 )             39.6     07-30    140  |  104  |  19<H>  ----------------------------<  133<H>  3.9   |  32<H>  |  0.41<L>    Ca    8.2<L>      30 Jul 2021 04:26  Phos  3.1     07-30  Mg     2.3     07-30    TPro  5.3<L>  /  Alb  2.5<L>  /  TBili  0.6  /  DBili  x   /  AST  10  /  ALT  20  /  AlkPhos  48  07-29        EKG:   IMAGING:    ASSESSMENT:  HPI:  59 yo M with PMH including HTN, HLD, DM, CHF, CAD (w stents), COPD on 2.5L home 02 and night BIPAP (12/5 35%) presents from [facility] for shortness of breath. When EMS arrived pt was noted to be in respiratory distress and his portable O2 machine was uncharged and not running. Pt endorsed to ED physician his SOB has worsened overnight after being discharged 1 day PTA. Off note Pt was here last night for left ankle pain, He was diagnosed with non-displaced left lateral malleolus fracture. Pt was noted to be tachycardic and sob but refused admission and discharged with cast. Pt was Ax0 x3 in ED but Upon my examination pt was lethargic and unable to answer any questions.  (25 Jul 2021 09:56)      PLAN:     FOLLOW UP / RESULT: EVENT: Paged by RN, pt c/o back pain    HPI:  59 yo M with PMH including HTN, HLD, DM, CHF, CAD (w stents), COPD on 2.5L home 02 and night BIPAP (12/5 35%) presents from [facility] for shortness of breath. ICU was consulted and admitted pt for Acute on chronic hypercapnic respiratory failure requiring intubation.    ICU Course: The patient arrived on 7/25 to the ICU with severe acute hypoxic respiratory 2/2 COPD exacerbation and altered mental status. He was intubated on arrival for hypoxia and protection of airway. He also became hypotensive and developed thick secretions. A central line was placed and he was started on a levo drip and a course of cefepime. He was extubated on 7/28. He received a course of steroids, and inhalation treatment with albuterol and ipratropium. Patient is intermittently compliant with BIPAP. When not on BIPAP he is on 4 L NC. Patient is now off pressors. Central line and arterial line were removed prior to transfer. Tejada was removed and a condom cath placed. Downgraded to medicine 7/30.     SUBJECTIVE: Pt reports 9-10/10 lower back pain, states it is chronic. PTA meds reviewed--pt takes Percocet 1 tab q 6 hours.     OBJECTIVE:  Vital Signs Last 24 Hrs  T(C): 37.1 (30 Jul 2021 20:55), Max: 37.1 (30 Jul 2021 13:00)  T(F): 98.7 (30 Jul 2021 20:55), Max: 98.7 (30 Jul 2021 13:00)  HR: 95 (30 Jul 2021 20:55) (75 - 105)  BP: 140/83 (30 Jul 2021 20:55) (113/64 - 140/83)  BP(mean): 85 (30 Jul 2021 09:00) (68 - 90)  RR: 18 (30 Jul 2021 20:55) (8 - 28)  SpO2: 93% (30 Jul 2021 20:55) (90% - 100%)    PHYSICAL EXAM:  Neuro: Awake and alert, oriented to person, place, and time  Cardiovascular: + S1, S2, no murmurs, rubs, or bruits  Respiratory: clear to auscultation bilaterally with good air entry   GI:  Obese Abdomen soft, non-tender, bowel sounds present   : Non distended;   Skin: warm and dry; no rash      LABS:                        12.2   8.46  )-----------( 169      ( 30 Jul 2021 05:37 )             39.6     07-30    140  |  104  |  19<H>  ----------------------------<  133<H>  3.9   |  32<H>  |  0.41<L>    Ca    8.2<L>      30 Jul 2021 04:26  Phos  3.1     07-30  Mg     2.3     07-30    TPro  5.3<L>  /  Alb  2.5<L>  /  TBili  0.6  /  DBili  x   /  AST  10  /  ALT  20  /  AlkPhos  48  07-29        EKG:   IMAGING:    ASSESSMENT/PROBLEM: Chronic back pain    PLAN:     -Percocet 1 tab x 1  -Continue current/ supportive care    FOLLOW UP / RESULT:    -Monitor effectiveness of pain medication

## 2021-07-30 NOTE — PROGRESS NOTE ADULT - SUBJECTIVE AND OBJECTIVE BOX
INTERVAL HPI/OVERNIGHT EVENTS: ***    PRESSORS: [ ] YES [ ] NO  WHICH:      Antimicrobial:  cefepime   IVPB 2000 milliGRAM(s) IV Intermittent every 12 hours    Cardiovascular:    Pulmonary:  ALBUTerol    90 MICROgram(s) HFA Inhaler 2 Puff(s) Inhalation every 6 hours  ipratropium 17 MICROgram(s) HFA Inhaler 1 Puff(s) Inhalation every 6 hours    Hematalogic:  aspirin  chewable 81 milliGRAM(s) Oral daily  enoxaparin Injectable 40 milliGRAM(s) SubCutaneous daily    Other:  artificial  tears Solution 1 Drop(s) Both EYES three times a day  chlorhexidine 2% Cloths 1 Application(s) Topical <User Schedule>  dextrose 50% Injectable 12.5 Gram(s) IV Push once  insulin glargine Injectable (LANTUS) 10 Unit(s) SubCutaneous at bedtime  insulin lispro (ADMELOG) corrective regimen sliding scale   SubCutaneous Before meals and at bedtime  nicotine -  14 mG/24Hr(s) Patch 1 patch Transdermal daily  pantoprazole  Injectable 40 milliGRAM(s) IV Push daily  polyethylene glycol 3350 17 Gram(s) Oral daily  predniSONE   Tablet 40 milliGRAM(s) Oral daily  risperiDONE   Tablet 1 milliGRAM(s) Oral daily  senna 2 Tablet(s) Oral at bedtime  sodium chloride 0.9% lock flush 10 milliLiter(s) IV Push every 1 hour PRN  traZODone 150 milliGRAM(s) Oral at bedtime    ALBUTerol    90 MICROgram(s) HFA Inhaler 2 Puff(s) Inhalation every 6 hours  artificial  tears Solution 1 Drop(s) Both EYES three times a day  aspirin  chewable 81 milliGRAM(s) Oral daily  cefepime   IVPB 2000 milliGRAM(s) IV Intermittent every 12 hours  chlorhexidine 2% Cloths 1 Application(s) Topical <User Schedule>  dextrose 50% Injectable 12.5 Gram(s) IV Push once  enoxaparin Injectable 40 milliGRAM(s) SubCutaneous daily  insulin glargine Injectable (LANTUS) 10 Unit(s) SubCutaneous at bedtime  insulin lispro (ADMELOG) corrective regimen sliding scale   SubCutaneous Before meals and at bedtime  ipratropium 17 MICROgram(s) HFA Inhaler 1 Puff(s) Inhalation every 6 hours  nicotine -  14 mG/24Hr(s) Patch 1 patch Transdermal daily  pantoprazole  Injectable 40 milliGRAM(s) IV Push daily  polyethylene glycol 3350 17 Gram(s) Oral daily  predniSONE   Tablet 40 milliGRAM(s) Oral daily  risperiDONE   Tablet 1 milliGRAM(s) Oral daily  senna 2 Tablet(s) Oral at bedtime  sodium chloride 0.9% lock flush 10 milliLiter(s) IV Push every 1 hour PRN  traZODone 150 milliGRAM(s) Oral at bedtime    Drug Dosing Weight  Height (cm): 172.7 (25 Jul 2021 07:05)  Weight (kg): 93.5 (25 Jul 2021 10:35)  BMI (kg/m2): 31.3 (25 Jul 2021 10:35)  BSA (m2): 2.07 (25 Jul 2021 10:35)    CENTRAL LINE: [ ] YES [ ] NO  LOCATION:         AMOR: [ ] YES [ ] NO          A-LINE:  [ ] YES [ ] NO  LOCATION:             ICU Vital Signs Last 24 Hrs  T(C): 35.8 (30 Jul 2021 06:00), Max: 37.1 (29 Jul 2021 08:00)  T(F): 96.5 (30 Jul 2021 06:00), Max: 98.7 (29 Jul 2021 08:00)  HR: 83 (30 Jul 2021 06:00) (75 - 108)  BP: 113/65 (30 Jul 2021 06:00) (106/57 - 128/71)  BP(mean): 75 (30 Jul 2021 06:00) (68 - 88)  ABP: 120/60 (29 Jul 2021 12:00) (116/67 - 126/63)  ABP(mean): 77 (29 Jul 2021 12:00) (76 - 81)  RR: 28 (30 Jul 2021 06:00) (8 - 32)  SpO2: 92% (30 Jul 2021 06:00) (85% - 100%)      ABG - ( 30 Jul 2021 04:46 )  pH, Arterial: 7.44  pH, Blood: x     /  pCO2: 49    /  pO2: 83    / HCO3: 33    / Base Excess: 7.8   /  SaO2: 97                    07-28 @ 07:01  -  07-29 @ 07:00  --------------------------------------------------------  IN: 631.8 mL / OUT: 3215 mL / NET: -2583.2 mL            REVIEW OF SYSTEMS:    CONSTITUTIONAL: No weakness, fevers or chills  NECK: No pain or stiffness  RESPIRATORY: No cough, wheezing, hemoptysis; No shortness of breath  CARDIOVASCULAR: No chest pain or palpitations  GASTROINTESTINAL: No abdominal or epigastric pain. No nausea, vomiting, No diarrhea or constipation. No melena or hematochezia.  GENITOURINARY: No dysuria, frequency or hematuria  NEUROLOGICAL: No numbness or weakness  All other review of systems is negative unless indicated above      PHYSICAL EXAM:    GENERAL: NAD, well-groomed, well-developed  EYES: EOMI, PERRLA,   NECK: Supple, No JVD; Normal thyroid; Trachea midline  NERVOUS SYSTEM:  Alert & Oriented X3,  Motor Strength 5/5 B/L upper and lower extremities; DTRs 2+ intact and symmetric  CHEST/LUNG: No rales, rhonchi, wheezing   HEART: Regular rate and rhythm; No murmurs,   ABDOMEN: Soft, Nontender, Nondistended; Bowel sounds present  EXTREMITIES:  2+ Peripheral Pulses, No clubbing, cyanosis, or edema        LABS:  CBC Full  -  ( 30 Jul 2021 05:37 )  WBC Count : 8.46 K/uL  RBC Count : 4.45 M/uL  Hemoglobin : 12.2 g/dL  Hematocrit : 39.6 %  Platelet Count - Automated : 169 K/uL  Mean Cell Volume : 89.0 fl  Mean Cell Hemoglobin : 27.4 pg  Mean Cell Hemoglobin Concentration : 30.8 gm/dL  Auto Neutrophil # : 6.29 K/uL  Auto Lymphocyte # : 1.43 K/uL  Auto Monocyte # : 0.65 K/uL  Auto Eosinophil # : 0.03 K/uL  Auto Basophil # : 0.01 K/uL  Auto Neutrophil % : 74.3 %  Auto Lymphocyte % : 16.9 %  Auto Monocyte % : 7.7 %  Auto Eosinophil % : 0.4 %  Auto Basophil % : 0.1 %    07-30    140  |  104  |  19<H>  ----------------------------<  133<H>  3.9   |  32<H>  |  0.41<L>    Ca    8.2<L>      30 Jul 2021 04:26  Phos  3.1     07-30  Mg     2.3     07-30    TPro  5.3<L>  /  Alb  2.5<L>  /  TBili  0.6  /  DBili  x   /  AST  10  /  ALT  20  /  AlkPhos  48  07-29            RADIOLOGY & ADDITIONAL STUDIES REVIEWED:  ***    [ ]GOALS OF CARE DISCUSSION WITH PATIENT/FAMILY/PROXY:    CRITICAL CARE TIME SPENT: 35 minutes INTERVAL HPI/OVERNIGHT EVENTS: wore BIPAP intermittently last night, otherwise on 4L NC    PRESSORS: [ ] YES x[ ] NO  WHICH:      Antimicrobial:  cefepime   IVPB 2000 milliGRAM(s) IV Intermittent every 12 hours    Cardiovascular:    Pulmonary:  ALBUTerol    90 MICROgram(s) HFA Inhaler 2 Puff(s) Inhalation every 6 hours  ipratropium 17 MICROgram(s) HFA Inhaler 1 Puff(s) Inhalation every 6 hours    Hematalogic:  aspirin  chewable 81 milliGRAM(s) Oral daily  enoxaparin Injectable 40 milliGRAM(s) SubCutaneous daily    Other:  artificial  tears Solution 1 Drop(s) Both EYES three times a day  chlorhexidine 2% Cloths 1 Application(s) Topical <User Schedule>  dextrose 50% Injectable 12.5 Gram(s) IV Push once  insulin glargine Injectable (LANTUS) 10 Unit(s) SubCutaneous at bedtime  insulin lispro (ADMELOG) corrective regimen sliding scale   SubCutaneous Before meals and at bedtime  nicotine -  14 mG/24Hr(s) Patch 1 patch Transdermal daily  pantoprazole  Injectable 40 milliGRAM(s) IV Push daily  polyethylene glycol 3350 17 Gram(s) Oral daily  predniSONE   Tablet 40 milliGRAM(s) Oral daily  risperiDONE   Tablet 1 milliGRAM(s) Oral daily  senna 2 Tablet(s) Oral at bedtime  sodium chloride 0.9% lock flush 10 milliLiter(s) IV Push every 1 hour PRN  traZODone 150 milliGRAM(s) Oral at bedtime    ALBUTerol    90 MICROgram(s) HFA Inhaler 2 Puff(s) Inhalation every 6 hours  artificial  tears Solution 1 Drop(s) Both EYES three times a day  aspirin  chewable 81 milliGRAM(s) Oral daily  cefepime   IVPB 2000 milliGRAM(s) IV Intermittent every 12 hours  chlorhexidine 2% Cloths 1 Application(s) Topical <User Schedule>  dextrose 50% Injectable 12.5 Gram(s) IV Push once  enoxaparin Injectable 40 milliGRAM(s) SubCutaneous daily  insulin glargine Injectable (LANTUS) 10 Unit(s) SubCutaneous at bedtime  insulin lispro (ADMELOG) corrective regimen sliding scale   SubCutaneous Before meals and at bedtime  ipratropium 17 MICROgram(s) HFA Inhaler 1 Puff(s) Inhalation every 6 hours  nicotine -  14 mG/24Hr(s) Patch 1 patch Transdermal daily  pantoprazole  Injectable 40 milliGRAM(s) IV Push daily  polyethylene glycol 3350 17 Gram(s) Oral daily  predniSONE   Tablet 40 milliGRAM(s) Oral daily  risperiDONE   Tablet 1 milliGRAM(s) Oral daily  senna 2 Tablet(s) Oral at bedtime  sodium chloride 0.9% lock flush 10 milliLiter(s) IV Push every 1 hour PRN  traZODone 150 milliGRAM(s) Oral at bedtime    Drug Dosing Weight  Height (cm): 172.7 (25 Jul 2021 07:05)  Weight (kg): 93.5 (25 Jul 2021 10:35)  BMI (kg/m2): 31.3 (25 Jul 2021 10:35)  BSA (m2): 2.07 (25 Jul 2021 10:35)    CENTRAL LINE: [ ] YES [x ] NO  LOCATION:         AMOR: [ ] YES [x ] NO          A-LINE:  [ ] YES [x ] NO  LOCATION:             ICU Vital Signs Last 24 Hrs  T(C): 35.8 (30 Jul 2021 06:00), Max: 37.1 (29 Jul 2021 08:00)  T(F): 96.5 (30 Jul 2021 06:00), Max: 98.7 (29 Jul 2021 08:00)  HR: 83 (30 Jul 2021 06:00) (75 - 108)  BP: 113/65 (30 Jul 2021 06:00) (106/57 - 128/71)  BP(mean): 75 (30 Jul 2021 06:00) (68 - 88)  ABP: 120/60 (29 Jul 2021 12:00) (116/67 - 126/63)  ABP(mean): 77 (29 Jul 2021 12:00) (76 - 81)  RR: 28 (30 Jul 2021 06:00) (8 - 32)  SpO2: 92% (30 Jul 2021 06:00) (85% - 100%)      ABG - ( 30 Jul 2021 04:46 )  pH, Arterial: 7.44  pH, Blood: x     /  pCO2: 49    /  pO2: 83    / HCO3: 33    / Base Excess: 7.8   /  SaO2: 97                    07-28 @ 07:01  -  07-29 @ 07:00  --------------------------------------------------------  IN: 631.8 mL / OUT: 3215 mL / NET: -2583.2 mL            REVIEW OF SYSTEMS:    CONSTITUTIONAL: No weakness, fevers or chills  NECK: No pain or stiffness  RESPIRATORY: No cough, wheezing, hemoptysis; No shortness of breath  CARDIOVASCULAR: No chest pain or palpitations  GASTROINTESTINAL: No abdominal or epigastric pain. No diarrhea or constipation.   GENITOURINARY: No dysuria, frequency or hematuria  NEUROLOGICAL: No numbness or weakness  All other review of systems is negative unless indicated above      PHYSICAL EXAM:    GENERAL: NAD, well-groomed, well-developed  EYES: EOMI, PERRLA,   NECK: Supple, No JVD; Trachea midline  NERVOUS SYSTEM:  Alert & Oriented X3,  Motor Strength 5/5 B/L upper and lower extremities; DTRs 2+ intact and symmetric  CHEST/LUNG: No rales, rhonchi, wheezing. Distant breath sounds  HEART: Regular rate and rhythm; No murmurs,   ABDOMEN: Soft, Nontender, Nondistended; Bowel sounds present  EXTREMITIES:  2+ Peripheral Pulses, No clubbing, cyanosis, or edema        LABS:  CBC Full  -  ( 30 Jul 2021 05:37 )  WBC Count : 8.46 K/uL  RBC Count : 4.45 M/uL  Hemoglobin : 12.2 g/dL  Hematocrit : 39.6 %  Platelet Count - Automated : 169 K/uL  Mean Cell Volume : 89.0 fl  Mean Cell Hemoglobin : 27.4 pg  Mean Cell Hemoglobin Concentration : 30.8 gm/dL  Auto Neutrophil # : 6.29 K/uL  Auto Lymphocyte # : 1.43 K/uL  Auto Monocyte # : 0.65 K/uL  Auto Eosinophil # : 0.03 K/uL  Auto Basophil # : 0.01 K/uL  Auto Neutrophil % : 74.3 %  Auto Lymphocyte % : 16.9 %  Auto Monocyte % : 7.7 %  Auto Eosinophil % : 0.4 %  Auto Basophil % : 0.1 %    07-30    140  |  104  |  19<H>  ----------------------------<  133<H>  3.9   |  32<H>  |  0.41<L>    Ca    8.2<L>      30 Jul 2021 04:26  Phos  3.1     07-30  Mg     2.3     07-30    TPro  5.3<L>  /  Alb  2.5<L>  /  TBili  0.6  /  DBili  x   /  AST  10  /  ALT  20  /  AlkPhos  48  07-29            RADIOLOGY & ADDITIONAL STUDIES REVIEWED:  ***    [ ]GOALS OF CARE DISCUSSION WITH PATIENT/FAMILY/PROXY:    CRITICAL CARE TIME SPENT: 35 minutes

## 2021-07-30 NOTE — PROGRESS NOTE ADULT - ASSESSMENT
Hospital Course: 59 yo M with PMH including HTN, HLD, DM, CHF, CAD (w stents), COPD on 2.5L home 02 and night BIPAP (12/5 35%) presents from [facility] for shortness of breath. ICU was consulted and admitted pt for Acute on chronic hypercapnic respiratory failure requiring intubation.    ICU Course: The patient arrived on 7/25 to the ICU with severe acute hypoxic respiratory 2/2 COPD exacerbation and altered mental status. He was intubated on arrival for hypoxia and protection of airway. He also became hypotensive and developed thick secretions. A central line was placed and he was started on a levo drip and a course of cefepime. He was extubated on 7/28. He received a course of steroids, and inhalation treatment with albuterol and ipratropium. Patient is intermittently compliant with BIPAP. When not on BIPAP he is on 4 L NC. Patient is now off pressors. Central line and arterial line were removed prior to transfer. Tejada was removed and a condom cath placed.

## 2021-07-30 NOTE — PROGRESS NOTE ADULT - ASSESSMENT
59 yo M with PMH including HTN, HLD, DM, CHF, CAD (w stents), COPD on 2.5L home 02 and night BIPAP ( 35%) presents from [facility] for shortness of breath. ICU was consulted and admitted pt for Acute on chronic hypercapnic respiratory failure requiring intubation       Neuro:  No issues     Cardiovascular:  Patient has h/o CHF, on lasix at home  Does not look fluid overloaded, not in CHF exacerbation  will hold Lasix for now     Pulmonary:   Acute on chronic Hypoxic, Hypercapnic Respiratory failure   ETT   Extubated   non-compliant with BIPAP  hx of multiple exacerbations, >2 in past year, class D COPD   Taper steroids, prednisone   c/w cefepime b/c thick secretions  albuterol and ipratropium inhalation therapy  PT and OOB  remove lines    Infectious Diseases:  no active issues     Gastrointestinal:  Soft DASH diet    Renal:  no active issues     Endo:   Has h/o DM  B  Increase lantus to 8  c/w sliding scale    Heme/onc:   mild leukocytosis; likely reactive   will monitor for now     thrombocytopenia   plt < 140   184 on   ctm    Skin/ catheter: 2 peripheral IV, RRAL, RIJ    Prophylaxis: lovenox qd     Goals of Care: Full code     Dispo: Transfer to medicine         59 yo M with PMH including HTN, HLD, DM, CHF, CAD (w stents), COPD on 2.5L home 02 and night BIPAP ( 35%) presents from [facility] for shortness of breath. ICU was consulted and admitted pt for Acute on chronic hypercapnic respiratory failure requiring intubation       Neuro:  No issues     Cardiovascular:  Patient has h/o CHF, on lasix at home  Does not look fluid overloaded, not in CHF exacerbation  will hold Lasix for now     Pulmonary:   Acute on chronic Hypoxic, Hypercapnic Respiratory failure, resolved  ETT   Extubated   non-compliant with BIPAP  hx of multiple exacerbations, >2 in past year, class D COPD   Taper steroids, prednisone   c/w cefepime b/c thick secretions  albuterol and ipratropium inhalation therapy  PT and OOB    Infectious Diseases:  no active issues     Gastrointestinal:  Soft DASH diet    Renal:  no active issues     Endo:   Has h/o DM  B  Lantus 8  c/w sliding scale    Heme/onc:   mild leukocytosis; likely reactive   will monitor for now     thrombocytopenia   plt < 140   184 on   ctm    Skin/ catheter: 2 peripheral IV, RRAL, RIJ    Prophylaxis: lovenox qd     Goals of Care: Full code     Dispo: Transfer to medicine

## 2021-07-30 NOTE — PROGRESS NOTE ADULT - SUBJECTIVE AND OBJECTIVE BOX
NP Note discussed with  Primary Attending    Patient is a 61y old  Male who presents with a chief complaint of AHHRF (30 Jul 2021 10:45)      INTERVAL HPI/OVERNIGHT EVENTS: Transferred from ICU to medicine.  HPI: Hospital Course: 61 yo M with PMH including HTN, HLD, DM, CHF, CAD (w stents), COPD on 3-4LPM  home 02 and Trilogy at night. Presents from [facility] for shortness of breath. ICU was consulted and admitted pt for Acute on chronic hypercapnic respiratory failure requiring intubation.    ICU Course: The patient arrived on 7/25 to the ICU with severe acute hypoxic respiratory 2/2 COPD exacerbation and altered mental status. He was intubated on arrival for hypoxia and protection of airway. He also became hypotensive and developed thick secretions. A central line was placed and he was started on a levo drip and a course of cefepime. He was extubated on 7/28. He received a course of steroids, and inhalation treatment with albuterol and ipratropium. Patient is intermittently compliant with BIPAP. When not on BIPAP he is on 4 L NC. Patient is now off pressors. Central line and arterial line were removed prior to transfer. Tejada was removed and a condom cath placed.      MEDICATIONS  (STANDING):  ALBUTerol    90 MICROgram(s) HFA Inhaler 2 Puff(s) Inhalation every 6 hours  artificial  tears Solution 1 Drop(s) Both EYES three times a day  aspirin  chewable 81 milliGRAM(s) Oral daily  cefepime   IVPB 2000 milliGRAM(s) IV Intermittent every 12 hours  chlorhexidine 2% Cloths 1 Application(s) Topical <User Schedule>  dextrose 50% Injectable 12.5 Gram(s) IV Push once  enoxaparin Injectable 40 milliGRAM(s) SubCutaneous daily  insulin glargine Injectable (LANTUS) 10 Unit(s) SubCutaneous at bedtime  insulin lispro (ADMELOG) corrective regimen sliding scale   SubCutaneous Before meals and at bedtime  ipratropium 17 MICROgram(s) HFA Inhaler 1 Puff(s) Inhalation every 6 hours  nicotine -  14 mG/24Hr(s) Patch 1 patch Transdermal daily  pantoprazole    Tablet 40 milliGRAM(s) Oral before breakfast  polyethylene glycol 3350 17 Gram(s) Oral daily  predniSONE   Tablet 40 milliGRAM(s) Oral daily  risperiDONE   Tablet 1 milliGRAM(s) Oral daily  senna 2 Tablet(s) Oral at bedtime  traZODone 150 milliGRAM(s) Oral at bedtime    MEDICATIONS  (PRN):  sodium chloride 0.9% lock flush 10 milliLiter(s) IV Push every 1 hour PRN Pre/post blood products, medications, blood draw, and to maintain line patency      __________________________________________________  REVIEW OF SYSTEMS:    CONSTITUTIONAL: No fever,   EYES: no acute visual disturbances  NECK: No pain or stiffness  RESPIRATORY: +SOB with minimal exertion. Nonproductive cough  CARDIOVASCULAR: No chest pain, no palpitations  GASTROINTESTINAL: No pain. No nausea or vomiting; No diarrhea   NEUROLOGICAL: No headache or numbness, no tremors  MUSCULOSKELETAL: Left lower leg/ankle pain  GENITOURINARY: no dysuria, no frequency, no hesitancy  PSYCHIATRY: no depression , no anxiety  ALL OTHER  ROS negative        Vital Signs Last 24 Hrs  T(C): 36.8 (30 Jul 2021 10:20), Max: 36.8 (30 Jul 2021 10:20)  T(F): 98.2 (30 Jul 2021 10:20), Max: 98.2 (30 Jul 2021 10:20)  HR: 103 (30 Jul 2021 10:20) (75 - 108)  BP: 133/80 (30 Jul 2021 10:20) (112/61 - 133/80)  BP(mean): 85 (30 Jul 2021 09:00) (68 - 90)  RR: 22 (30 Jul 2021 10:20) (8 - 32)  SpO2: 92% (30 Jul 2021 10:20) (85% - 100%)    ________________________________________________  PHYSICAL EXAM:  GENERAL: SOB with minimal exertion  HEENT: Normocephalic;  conjunctivae and sclerae clear; moist mucous membranes;   NECK : supple, No JVD  CHEST/LUNG: Diminished breath sounds bilaterally with scattered rhonchi  HEART: S1 S2  regular; no murmurs, gallops or rubs  ABDOMEN: Soft, Obese Nontender, Nondistended; Bowel sounds present  EXTREMITIES: no cyanosis; no edema; no calf tenderness  SKIN: warm and dry; no rash  NERVOUS SYSTEM:  Awake and alert; Oriented  to place, person and time ; no new deficits    _________________________________________________  LABS:                        12.2   8.46  )-----------( 169      ( 30 Jul 2021 05:37 )             39.6     07-30    140  |  104  |  19<H>  ----------------------------<  133<H>  3.9   |  32<H>  |  0.41<L>    Ca    8.2<L>      30 Jul 2021 04:26  Phos  3.1     07-30  Mg     2.3     07-30    TPro  5.3<L>  /  Alb  2.5<L>  /  TBili  0.6  /  DBili  x   /  AST  10  /  ALT  20  /  AlkPhos  48  07-29        CAPILLARY BLOOD GLUCOSE      POCT Blood Glucose.: 287 mg/dL (30 Jul 2021 11:44)  POCT Blood Glucose.: 281 mg/dL (30 Jul 2021 10:29)  POCT Blood Glucose.: 130 mg/dL (30 Jul 2021 05:27)  POCT Blood Glucose.: 206 mg/dL (29 Jul 2021 21:25)  POCT Blood Glucose.: 215 mg/dL (29 Jul 2021 16:25)        RADIOLOGY & ADDITIONAL TESTS:    Imaging Personally Reviewed:  YES  < from: Xray Chest 1 View- PORTABLE-Routine (Xray Chest 1 View- PORTABLE-Routine in AM.) (07.28.21 @ 06:53) >  The lungs show asymmetric haziness overlying the LEFT hemithorax which may indicate pleural effusion layering along the LEFT posterior thoracic wall. Remaining lung parenchyma clear.  . No pneumothorax.    The heart and mediastinum size and configuration are within normal limits.    Visualized osseous structures are intact.    IMPRESSION:  Mild haziness overlying LEFT hemithorax/pleural effusion.  ET tube tip above tracheal bifurcation.  NG tube tip beyond GE junction.  RIGHT IJ catheter tip in SVC.    < end of copied text >  < from: Xray Ankle Complete 3 Views, Left (07.24.21 @ 19:27) >  FINDINGS: Frontal, lateral and oblique projections of the left ankle.    Diffuse soft tissue swelling. Osteopenia. No fracture or dislocation. Ankle mortise is aligned. Extensive vascular calcification.    IMPRESSION:    No fracture or dislocation left ankle appreciated.    < end of copied text >      Consultant(s) Notes Reviewed:   YES    Care Discussed with Consultants :     Plan of care was discussed with patient and /or primary care giver; all questions and concerns were addressed and care was aligned with patient's wishes.

## 2021-07-30 NOTE — PROGRESS NOTE ADULT - SUBJECTIVE AND OBJECTIVE BOX
Patient is a 61y old  Male who presents with a chief complaint of AHHRF (2021 06:56)    PATIENT IS SEEN AND EXAMINED IN MEDICAL FLOOR.  JAMILA [    ]    MT [   ]      GT [   ]    ALLERGIES:  No Known Allergies      Daily     Daily Weight in k.2 (2021 08:00)    VITALS:    Vital Signs Last 24 Hrs  T(C): 36.8 (2021 10:20), Max: 36.9 (2021 11:00)  T(F): 98.2 (2021 10:20), Max: 98.4 (2021 11:00)  HR: 103 (2021 10:20) (75 - 108)  BP: 133/80 (2021 10:20) (106/60 - 133/80)  BP(mean): 85 (2021 09:00) (68 - 90)  RR: 22 (2021 10:20) (8 - 32)  SpO2: 92% (2021 10:20) (85% - 100%)    LABS:    CBC Full  -  ( 2021 05:37 )  WBC Count : 8.46 K/uL  RBC Count : 4.45 M/uL  Hemoglobin : 12.2 g/dL  Hematocrit : 39.6 %  Platelet Count - Automated : 169 K/uL  Mean Cell Volume : 89.0 fl  Mean Cell Hemoglobin : 27.4 pg  Mean Cell Hemoglobin Concentration : 30.8 gm/dL  Auto Neutrophil # : 6.29 K/uL  Auto Lymphocyte # : 1.43 K/uL  Auto Monocyte # : 0.65 K/uL  Auto Eosinophil # : 0.03 K/uL  Auto Basophil # : 0.01 K/uL  Auto Neutrophil % : 74.3 %  Auto Lymphocyte % : 16.9 %  Auto Monocyte % : 7.7 %  Auto Eosinophil % : 0.4 %  Auto Basophil % : 0.1 %          140  |  104  |  19<H>  ----------------------------<  133<H>  3.9   |  32<H>  |  0.41<L>    Ca    8.2<L>      2021 04:26  Phos  3.1     07-30  Mg     2.3     07-30    TPro  5.3<L>  /  Alb  2.5<L>  /  TBili  0.6  /  DBili  x   /  AST  10  /  ALT  20  /  AlkPhos  48      CAPILLARY BLOOD GLUCOSE      POCT Blood Glucose.: 281 mg/dL (2021 10:29)  POCT Blood Glucose.: 130 mg/dL (2021 05:27)  POCT Blood Glucose.: 206 mg/dL (2021 21:25)  POCT Blood Glucose.: 215 mg/dL (2021 16:25)  POCT Blood Glucose.: 228 mg/dL (2021 11:01)        LIVER FUNCTIONS - ( 2021 04:46 )  Alb: 2.5 g/dL / Pro: 5.3 g/dL / ALK PHOS: 48 U/L / ALT: 20 U/L DA / AST: 10 U/L / GGT: x           Creatinine Trend: 0.41<--, 0.44<--, 0.51<--, 0.52<--, 0.55<--, 0.42<--  I&O's Summary    2021 07:01  -  2021 07:00  --------------------------------------------------------  IN: 690 mL / OUT: 1460 mL / NET: -770 mL    2021 07:01  -  2021 10:46  --------------------------------------------------------  IN: 150 mL / OUT: 300 mL / NET: -150 mL        ABG - ( 2021 04:46 )  pH, Arterial: 7.44  pH, Blood: x     /  pCO2: 49    /  pO2: 83    / HCO3: 33    / Base Excess: 7.8   /  SaO2: 97                      MEDICATIONS:    MEDICATIONS  (STANDING):  ALBUTerol    90 MICROgram(s) HFA Inhaler 2 Puff(s) Inhalation every 6 hours  artificial  tears Solution 1 Drop(s) Both EYES three times a day  aspirin  chewable 81 milliGRAM(s) Oral daily  cefepime   IVPB 2000 milliGRAM(s) IV Intermittent every 12 hours  chlorhexidine 2% Cloths 1 Application(s) Topical <User Schedule>  dextrose 50% Injectable 12.5 Gram(s) IV Push once  enoxaparin Injectable 40 milliGRAM(s) SubCutaneous daily  insulin glargine Injectable (LANTUS) 10 Unit(s) SubCutaneous at bedtime  insulin lispro (ADMELOG) corrective regimen sliding scale   SubCutaneous Before meals and at bedtime  ipratropium 17 MICROgram(s) HFA Inhaler 1 Puff(s) Inhalation every 6 hours  nicotine -  14 mG/24Hr(s) Patch 1 patch Transdermal daily  pantoprazole    Tablet 40 milliGRAM(s) Oral before breakfast  polyethylene glycol 3350 17 Gram(s) Oral daily  predniSONE   Tablet 40 milliGRAM(s) Oral daily  risperiDONE   Tablet 1 milliGRAM(s) Oral daily  senna 2 Tablet(s) Oral at bedtime  traZODone 150 milliGRAM(s) Oral at bedtime      MEDICATIONS  (PRN):  sodium chloride 0.9% lock flush 10 milliLiter(s) IV Push every 1 hour PRN Pre/post blood products, medications, blood draw, and to maintain line patency      REVIEW OF SYSTEMS:                           ALL ROS DONE [ X   ]    CONSTITUTIONAL:  LETHARGIC [   ], FEVER [   ], UNRESPONSIVE [   ]  CVS:  CP  [   ], SOB, [   ], PALPITATIONS [   ], DIZZYNESS [   ]  RS: COUGH [   ], SPUTUM [   ]  GI: ABDOMINAL PAIN [   ], NAUSEA [   ], VOMITINGS [   ], DIARRHEA [   ], CONSTIPATION [   ]  :  DYSURIA [   ], NOCTURIA [   ], INCREASED FREQUENCY [   ], DRIBLING [   ],  SKELETAL: PAINFUL JOINTS [   ], SWOLLEN JOINTS [   ], NECK ACHE [   ], LOW BACK ACHE [   ],  SKIN : ULCERS [   ], RASH [   ], ITCHING [   ]  CNS: HEAD ACHE [   ], DOUBLE VISION [   ], BLURRED VISION [   ], AMS / CONFUSION [   ], SEIZURES [   ], WEAKNESS [   ],TINGLING / NUMBNESS [   ]    PHYSICAL EXAMINATION:  GENERAL APPEARANCE: NO DISTRESS  HEENT:  NO PALLOR, NO  JVD,  NO   NODES, NECK SUPPLE  CVS: S1 +, S2 +,   RS: AEEB,  OCCASIONAL  RALES +,   RHONCHI, ON VENTIMASK  ABD: SOFT, NT, NO, BS +    NGTUBE+  EXT: NO PE  SKIN: WARM,   CVC+  SKELETAL:  ROM ACCEPTABLE, LEFT ANKLE   CNS:  AAO X 2-3, NO  DEFICITS    RADIOLOGY :    EXAM:  XR CHEST PORTABLE URGENT 1V                            PROCEDURE DATE:  2021          INTERPRETATION:  History: Shortness of breath. Difficulty breathing.    FINDINGS: Frontal chest.    COMPARISON: 2021.    Exam is limited due to collimation at the lung bases.    Heart size may be exaggerated by AP technique. Mild bronchovascular crowding at the bases. There is no focal lung consolidation or sizable pleural effusion. No significant osseous abnormality. Degenerative change of the spine.    IMPRESSION:    Unremarkable frontal chest.    ==================================================    EXAM:  XR ANKLE COMP MIN 3 VIEWS LT                            PROCEDURE DATE:  2021          INTERPRETATION:  History: Fall, ankle pain.    FINDINGS: Frontal, lateral and oblique projections of the left ankle.    Diffuse soft tissue swelling. Osteopenia. No fracture or dislocation. Ankle mortise is aligned. Extensive vascular calcification.    IMPRESSION:    No fracture or dislocation left ankle appreciated.      ASSESSMENT :       PLAN:  HPI:  59 yo M with PMH including HTN, HLD, DM, CHF, CAD (w stents), COPD on 2.5L home 02 and night BIPAP (12/ 35%) presents from [facility] for shortness of breath. When EMS arrived pt was noted to be in respiratory distress and his portable O2 machine was uncharged and not running. Pt endorsed to ED physician his SOB has worsened overnight after being discharged 1 day PTA. Off note Pt was here last night for left ankle pain, He was diagnosed with non-displaced left lateral malleolus fracture. Pt was noted to be tachycardic and sob but refused admission and discharged with cast. Pt was Ax0 x3 in ED but Upon my examination pt was lethargic and unable to answer any questions.  (2021 09:56)    # ACUTE ON CHRONIC HYPOXIC HYPERCAPNEIC RESPIRATORY FAILURE, EMPHYSEMA - S/P INTUBATION, ON SOLUMEDROL [TAPERING], ON DUONEB  - ON VENTURI MASK - TRY TO WEAN TO NC   - STARTED ON CEFEPIME  # MORBIDLY OBESE WITH RESTRICTIVE LUNG DISEASE, SUSPECT OBSTRUCTIVE SLEEP APNOEA , COPD ON O2 NC AT BASELINE  # NONDISPLACED LEFT LATERAL MALLEOLUS FRACTURE - PODIATRY CONSULT  # AMS - SUSPECT S/T HYPERCAPNEA - RESOLVED  # HTN  # HLD  # CHRONIC CHF  # CAD S/P STENT  # DM  # TOBACCO ABUSE  #  GI AND DVT PROPHYLAXIS    NAEEM CABELLO MD COVERING ADRIANA CABELLO MD   Patient is a 61y old  Male who presents with a chief complaint of AHHRF (2021 06:56)    PATIENT IS SEEN AND EXAMINED IN MEDICAL FLOOR.    ALLERGIES:  No Known Allergies      Daily     Daily Weight in k.2 (2021 08:00)    VITALS:    Vital Signs Last 24 Hrs  T(C): 36.8 (2021 10:20), Max: 36.9 (2021 11:00)  T(F): 98.2 (2021 10:20), Max: 98.4 (2021 11:00)  HR: 103 (2021 10:20) (75 - 108)  BP: 133/80 (2021 10:20) (106/60 - 133/80)  BP(mean): 85 (2021 09:00) (68 - 90)  RR: 22 (2021 10:20) (8 - 32)  SpO2: 92% (2021 10:20) (85% - 100%)    LABS:    CBC Full  -  ( 2021 05:37 )  WBC Count : 8.46 K/uL  RBC Count : 4.45 M/uL  Hemoglobin : 12.2 g/dL  Hematocrit : 39.6 %  Platelet Count - Automated : 169 K/uL  Mean Cell Volume : 89.0 fl  Mean Cell Hemoglobin : 27.4 pg  Mean Cell Hemoglobin Concentration : 30.8 gm/dL  Auto Neutrophil # : 6.29 K/uL  Auto Lymphocyte # : 1.43 K/uL  Auto Monocyte # : 0.65 K/uL  Auto Eosinophil # : 0.03 K/uL  Auto Basophil # : 0.01 K/uL  Auto Neutrophil % : 74.3 %  Auto Lymphocyte % : 16.9 %  Auto Monocyte % : 7.7 %  Auto Eosinophil % : 0.4 %  Auto Basophil % : 0.1 %          140  |  104  |  19<H>  ----------------------------<  133<H>  3.9   |  32<H>  |  0.41<L>    Ca    8.2<L>      2021 04:26  Phos  3.1     07-30  Mg     2.3     07-30    TPro  5.3<L>  /  Alb  2.5<L>  /  TBili  0.6  /  DBili  x   /  AST  10  /  ALT  20  /  AlkPhos  48      CAPILLARY BLOOD GLUCOSE      POCT Blood Glucose.: 281 mg/dL (2021 10:29)  POCT Blood Glucose.: 130 mg/dL (2021 05:27)  POCT Blood Glucose.: 206 mg/dL (2021 21:25)  POCT Blood Glucose.: 215 mg/dL (2021 16:25)  POCT Blood Glucose.: 228 mg/dL (2021 11:01)        LIVER FUNCTIONS - ( 2021 04:46 )  Alb: 2.5 g/dL / Pro: 5.3 g/dL / ALK PHOS: 48 U/L / ALT: 20 U/L DA / AST: 10 U/L / GGT: x           Creatinine Trend: 0.41<--, 0.44<--, 0.51<--, 0.52<--, 0.55<--, 0.42<--  I&O's Summary    2021 07:01  -  2021 07:00  --------------------------------------------------------  IN: 690 mL / OUT: 1460 mL / NET: -770 mL    2021 07:01  -  2021 10:46  --------------------------------------------------------  IN: 150 mL / OUT: 300 mL / NET: -150 mL        ABG - ( 2021 04:46 )  pH, Arterial: 7.44  pH, Blood: x     /  pCO2: 49    /  pO2: 83    / HCO3: 33    / Base Excess: 7.8   /  SaO2: 97          MEDICATIONS:    MEDICATIONS  (STANDING):  ALBUTerol    90 MICROgram(s) HFA Inhaler 2 Puff(s) Inhalation every 6 hours  artificial  tears Solution 1 Drop(s) Both EYES three times a day  aspirin  chewable 81 milliGRAM(s) Oral daily  cefepime   IVPB 2000 milliGRAM(s) IV Intermittent every 12 hours  chlorhexidine 2% Cloths 1 Application(s) Topical <User Schedule>  dextrose 50% Injectable 12.5 Gram(s) IV Push once  enoxaparin Injectable 40 milliGRAM(s) SubCutaneous daily  insulin glargine Injectable (LANTUS) 10 Unit(s) SubCutaneous at bedtime  insulin lispro (ADMELOG) corrective regimen sliding scale   SubCutaneous Before meals and at bedtime  ipratropium 17 MICROgram(s) HFA Inhaler 1 Puff(s) Inhalation every 6 hours  nicotine -  14 mG/24Hr(s) Patch 1 patch Transdermal daily  pantoprazole    Tablet 40 milliGRAM(s) Oral before breakfast  polyethylene glycol 3350 17 Gram(s) Oral daily  predniSONE   Tablet 40 milliGRAM(s) Oral daily  risperiDONE   Tablet 1 milliGRAM(s) Oral daily  senna 2 Tablet(s) Oral at bedtime  traZODone 150 milliGRAM(s) Oral at bedtime      MEDICATIONS  (PRN):  sodium chloride 0.9% lock flush 10 milliLiter(s) IV Push every 1 hour PRN Pre/post blood products, medications, blood draw, and to maintain line patency      REVIEW OF SYSTEMS:                           ALL ROS DONE [ X   ]    CONSTITUTIONAL:  LETHARGIC [   ], FEVER [   ], UNRESPONSIVE [   ]  CVS:  CP  [   ], SOB, [   ], PALPITATIONS [   ], DIZZYNESS [   ]  RS: COUGH [   ], SPUTUM [   ]  GI: ABDOMINAL PAIN [   ], NAUSEA [   ], VOMITINGS [   ], DIARRHEA [   ], CONSTIPATION [   ]  :  DYSURIA [   ], NOCTURIA [   ], INCREASED FREQUENCY [   ], DRIBLING [   ],  SKELETAL: PAINFUL JOINTS [   ], SWOLLEN JOINTS [   ], NECK ACHE [   ], LOW BACK ACHE [   ],  SKIN : ULCERS [   ], RASH [   ], ITCHING [   ]  CNS: HEAD ACHE [   ], DOUBLE VISION [   ], BLURRED VISION [   ], AMS / CONFUSION [   ], SEIZURES [   ], WEAKNESS [   ],TINGLING / NUMBNESS [   ]    PHYSICAL EXAMINATION:  GENERAL APPEARANCE: NO DISTRESS  HEENT:  NO PALLOR, NO  JVD,  NO   NODES, NECK SUPPLE  CVS: S1 +, S2 +,   RS: AEEB,  OCCASIONAL  RALES +,   RHONCHI, ON VENTIMASK  ABD: SOFT, NT, NO, BS +    NGTUBE+  EXT: NO PE  SKIN: WARM,   CVC+  SKELETAL:  ROM ACCEPTABLE, LEFT ANKLE   CNS:  AAO X 2-3, NO  DEFICITS    RADIOLOGY :    EXAM:  XR CHEST PORTABLE URGENT 1V                            PROCEDURE DATE:  2021          INTERPRETATION:  History: Shortness of breath. Difficulty breathing.    FINDINGS: Frontal chest.    COMPARISON: 2021.    Exam is limited due to collimation at the lung bases.    Heart size may be exaggerated by AP technique. Mild bronchovascular crowding at the bases. There is no focal lung consolidation or sizable pleural effusion. No significant osseous abnormality. Degenerative change of the spine.    IMPRESSION:    Unremarkable frontal chest.    ==================================================    EXAM:  XR ANKLE COMP MIN 3 VIEWS LT                            PROCEDURE DATE:  2021          INTERPRETATION:  History: Fall, ankle pain.    FINDINGS: Frontal, lateral and oblique projections of the left ankle.    Diffuse soft tissue swelling. Osteopenia. No fracture or dislocation. Ankle mortise is aligned. Extensive vascular calcification.    IMPRESSION:    No fracture or dislocation left ankle appreciated.      ASSESSMENT :       PLAN:  HPI:  59 yo M with PMH including HTN, HLD, DM, CHF, CAD (w stents), COPD on 2.5L home 02 and night BIPAP (12/ 35%) presents from [facility] for shortness of breath. When EMS arrived pt was noted to be in respiratory distress and his portable O2 machine was uncharged and not running. Pt endorsed to ED physician his SOB has worsened overnight after being discharged 1 day PTA. Off note Pt was here last night for left ankle pain, He was diagnosed with non-displaced left lateral malleolus fracture. Pt was noted to be tachycardic and sob but refused admission and discharged with cast. Pt was Ax0 x3 in ED but Upon my examination pt was lethargic and unable to answer any questions.  (2021 09:56)    # ACUTE ON CHRONIC HYPOXIC HYPERCAPNEIC RESPIRATORY FAILURE, EMPHYSEMA - S/P INTUBATION, ON PREDNISONE [TAPERED FROM SOLUMEDROL], ON DUONEB  - ON NC   - ON CEFEPIME  - COUNSELLED ON BIPAP COMPLIANCE  # MORBIDLY OBESE WITH RESTRICTIVE LUNG DISEASE, SUSPECT OBSTRUCTIVE SLEEP APNOEA , COPD ON O2 NC AT BASELINE  # NONDISPLACED LEFT LATERAL MALLEOLUS FRACTURE - PODIATRY CONSULT  - F/U CT LEFT FOOT  # AMS - SUSPECT S/T HYPERCAPNEA - RESOLVED  # HTN  # HLD  # CHRONIC CHF  # CAD S/P STENT  # DM  # TOBACCO ABUSE  #  GI AND DVT PROPHYLAXIS    NAEEM CABELLO MD COVERING ADRIANA CABELLO MD   not examined

## 2021-07-30 NOTE — CONSULT NOTE ADULT - SUBJECTIVE AND OBJECTIVE BOX
Time of visit:    CHIEF COMPLAINT: Patient is a 61y old  Male who presents with a chief complaint of AHHRF (30 Jul 2021 13:39)      HPI:  61 yo M with PMH including HTN, HLD, DM, CHF, CAD (w stents), COPD on 2.5L home 02 and night BIPAP (12/5 35%) presents from [facility] for shortness of breath. When EMS arrived pt was noted to be in respiratory distress and his portable O2 machine was uncharged and not running. Pt endorsed to ED physician his SOB has worsened overnight after being discharged 1 day PTA. Off note Pt was here last night for left ankle pain, He was diagnosed with non-displaced left lateral malleolus fracture. Pt was noted to be tachycardic and sob but refused admission and discharged with cast. Pt was Ax0 x3 in ED but Upon my examination pt was lethargic and unable to answer any questions.  (25 Jul 2021 09:56)   Patient seen and examined.     PAST MEDICAL & SURGICAL HISTORY:  COPD (chronic obstructive pulmonary disease)    Stented coronary artery    HLD (hyperlipidemia)    Pulmonary HTN    Type 2 diabetes mellitus without complication, with long-term current use of insulin    Coronary artery disease involving native coronary artery of native heart without angina pectoris    Bipolar 1 disorder    Smoker    Gastroesophageal reflux disease, esophagitis presence not specified    Oxygen dependent    COPD (chronic obstructive pulmonary disease)    DM (diabetes mellitus)    CAD (coronary artery disease)    GERD (gastroesophageal reflux disease)    HLD (hyperlipidemia)    Insomnia    Polyarthritis    No significant past surgical history        Allergies    No Known Allergies    Intolerances        MEDICATIONS  (STANDING):  ALBUTerol    90 MICROgram(s) HFA Inhaler 2 Puff(s) Inhalation every 6 hours  artificial  tears Solution 1 Drop(s) Both EYES three times a day  aspirin  chewable 81 milliGRAM(s) Oral daily  budesonide 160 MICROgram(s)/formoterol 4.5 MICROgram(s) Inhaler 2 Puff(s) Inhalation two times a day  cefepime   IVPB 2000 milliGRAM(s) IV Intermittent every 12 hours  chlorhexidine 2% Cloths 1 Application(s) Topical <User Schedule>  dextrose 50% Injectable 12.5 Gram(s) IV Push once  enoxaparin Injectable 40 milliGRAM(s) SubCutaneous daily  insulin glargine Injectable (LANTUS) 10 Unit(s) SubCutaneous at bedtime  insulin lispro (ADMELOG) corrective regimen sliding scale   SubCutaneous Before meals and at bedtime  ipratropium 17 MICROgram(s) HFA Inhaler 1 Puff(s) Inhalation every 6 hours  nicotine -  14 mG/24Hr(s) Patch 1 patch Transdermal daily  pantoprazole    Tablet 40 milliGRAM(s) Oral before breakfast  polyethylene glycol 3350 17 Gram(s) Oral daily  predniSONE   Tablet 40 milliGRAM(s) Oral daily  risperiDONE   Tablet 1 milliGRAM(s) Oral daily  senna 2 Tablet(s) Oral at bedtime  traZODone 150 milliGRAM(s) Oral at bedtime      MEDICATIONS  (PRN):  sodium chloride 0.9% lock flush 10 milliLiter(s) IV Push every 1 hour PRN Pre/post blood products, medications, blood draw, and to maintain line patency   Medications up to date at time of exam.    Medications up to date at time of exam.    FAMILY HISTORY:  No family history of cardiovascular disease    No family history of chronic obstructive pulmonary disease    No family history of hypertension    No family history of mental disorder        SOCIAL HISTORY  Smoking History: [  x ] smoking/smoke exposure, pat will not answer smoking question   Living Condition: [   ] apartment, [   ] private house  Work History:   Travel History: denies recent travel  Illicit Substance Use: denies  Alcohol Use: denies    REVIEW OF SYSTEMS:    CONSTITUTIONAL:  denies fevers, chills, sweats, weight loss    HEENT:  denies diplopia or blurred vision, sore throat or runny nose.    CARDIOVASCULAR:  denies pressure, squeezing, tightness, or heaviness about the chest; no palpitations.    RESPIRATORY:  denies SOB, cough, GIORDANO, wheezing.    GASTROINTESTINAL:  denies abdominal pain, nausea, vomiting or diarrhea.    GENITOURINARY: denies dysuria, frequency or urgency.    NEUROLOGIC:  denies numbness, tingling, seizures or weakness.    PSYCHIATRIC:  denies disorder of thought or mood.    MSK: denies swelling, redness      PHYSICAL EXAMINATION:    GENERAL: The patient is a well-developed, well-nourished, in no apparent distress.     Vital Signs Last 24 Hrs  T(C): 37.1 (30 Jul 2021 13:00), Max: 37.1 (30 Jul 2021 13:00)  T(F): 98.7 (30 Jul 2021 13:00), Max: 98.7 (30 Jul 2021 13:00)  HR: 105 (30 Jul 2021 13:00) (75 - 105)  BP: 117/73 (30 Jul 2021 13:00) (113/64 - 133/80)  BP(mean): 85 (30 Jul 2021 09:00) (68 - 90)  RR: 20 (30 Jul 2021 13:00) (8 - 32)  SpO2: 94% (30 Jul 2021 13:00) (90% - 100%)   (if applicable)    Chest Tube (if applicable)    HEENT: Head is normocephalic and atraumatic. Extraocular muscles are intact. Mucous membranes are moist.     NECK: Supple, no palpable adenopathy.    LUNGS: Clear to auscultation, no wheezing, rales, or rhonchi.    HEART: Regular rate and rhythm without murmur.    ABDOMEN: Soft, nontender, and nondistended.  No hepatosplenomegaly is noted.    RENAL: No difficulty voiding, no pelvic pain    EXTREMITIES: Without any cyanosis, clubbing, rash, lesions or edema.    NEUROLOGIC: Awake, alert, oriented, grossly intact    SKIN: Warm, dry, good turgor.      LABS:                        12.2   8.46  )-----------( 169      ( 30 Jul 2021 05:37 )             39.6     07-30    140  |  104  |  19<H>  ----------------------------<  133<H>  3.9   |  32<H>  |  0.41<L>    Ca    8.2<L>      30 Jul 2021 04:26  Phos  3.1     07-30  Mg     2.3     07-30    TPro  5.3<L>  /  Alb  2.5<L>  /  TBili  0.6  /  DBili  x   /  AST  10  /  ALT  20  /  AlkPhos  48  07-29        ABG - ( 30 Jul 2021 04:46 )  pH, Arterial: 7.44  pH, Blood: x     /  pCO2: 49    /  pO2: 83    / HCO3: 33    / Base Excess: 7.8   /  SaO2: 97                              MICROBIOLOGY: (if applicable)    RADIOLOGY & ADDITIONAL STUDIES:  EKG:   CXR:< from: Xray Chest 1 View- PORTABLE-Routine (Xray Chest 1 View- PORTABLE-Routine in AM.) (07.28.21 @ 06:53) >    EXAM:  XR CHEST PORTABLE ROUTINE 1V                            PROCEDURE DATE:  07/28/2021          INTERPRETATION:  Portable chest radiograph    CLINICAL INFORMATION: Intubation    TECHNIQUE:  Portable  AP view of the chest was obtained.    COMPARISON: 7/27/2021 chest available for review.    FINDINGS: ET tube tip above tracheal bifurcation.  NG tube tip beyond GE junction.  RIGHT IJ catheter tip in SVC.      The lungs show asymmetric haziness overlying the LEFT hemithorax which may indicate pleural effusion layering along the LEFT posterior thoracic wall. Remaining lung parenchyma clear.  . No pneumothorax.    The heart and mediastinum size and configuration are within normal limits.    Visualized osseous structures are intact.    IMPRESSION:  Mild haziness overlying LEFT hemithorax/pleural effusion.  ET tube tip above tracheal bifurcation.  NG tube tip beyond GE junction.  RIGHT IJ catheter tip in SVC.    --- End of Report ---            CHAU YAP MD; Attending Radiologist  This document has been electronically signed. Jul 28 2021  5:32PM    < end of copied text >    ECHO:< from: Transthoracic Echocardiogram (07.06.21 @ 07:20) >    Patient name: DEVIKA CARBALLO  YOB: 1960   Age: 60 (M)   MR#: [id number]  Study Date: 7/6/2021  Location: [place] [id number]: Reddy Veras RDCS  Study quality: Technically difficult  Referring Physician:  JAMES RIVERA MD  Blood Pressure: 109/78 mmHg  Height: 172 cm  Weight: 94 kg  BSA: 2.1 m2  ------------------------------------------------------------------------    PROCEDURE: Transthoracic echocardiogram with 2-D, M-Mode  and complete spectral and color flow Doppler.  INDICATION: Abnormal electrocardiogram [ECG] [EKG] (R94.31)  HISTORY:  ------------------------------------------------------------------------  DIMENSIONS:  Dimensions:     Normal Values:  LA:     3.2 cm    2.0 - 4.0 cm  Ao:     3.9 cm    2.0 - 3.8 cm  SEPTUM:0.8 cm    0.6 - 1.2 cm  PWT:    0.8 cm    0.6 - 1.1 cm  LVIDd:  4.8 cm    3.0 - 5.6 cm  LVIDs:  3.5 cm    1.8 - 4.0 cm      Derived Variables:  LVMI: 61 g/m2  RWT: 0.33  Ejection Fraction Visual Estimate: >55 %    ------------------------------------------------------------------------  OBSERVATIONS:  Mitral Valve: Normal mitral valve. Trace mitral  regurgitation.  Aortic Root: Normal aortic root.  Aortic Valve: Normal trileaflet aortic valve.  Left Atrium: Normal left atrium.  Left Ventricle: Endocardium not well visualized; grossly  normal left ventricular systolic function based on limited  views.   Segmental wall motion could not be assessed.  Normal left ventricular internal dimensions and wall  thicknesses.  Right Heart: Normal right atrium. Right ventricular  enlargement with normal RV systolic function. There is mild  tricuspid regurgitation.  Pericardium/PleuraNormal pericardium with no pericardial  effusion.  Hemodynamic: RA Pressure is 10 mm Hg. RV systolic pressure  is 59 mm Hg. Moderate pulmonary hypertension.  ------------------------------------------------------------------------  CONCLUSIONS:  TECHNICALLY VERY DIFFICULT STUDY, POOR ACOUSTIC WINDOWS    1. Trace mitral regurgitation.  2. Normal left ventricular internaldimensions and wall  thicknesses.  3. Endocardium not well visualized; grossly normal left  ventricular systolic function based on limited views.  Segmental wall motion could not be assessed.  4. Right ventricular enlargement with normal RV systolic  function.  5. RV systolic pressure is 59 mm Hg. Moderate pulmonary  hypertension.    ------------------------------------------------------------------------  Confirmed on  7/6/2021 - 14:37:07 by Charli Mendes MD  ------------------------------------------------------------------------    < end of copied text >      IMPRESSION: 61y Male PAST MEDICAL & SURGICAL HISTORY:  COPD (chronic obstructive pulmonary disease)    Stented coronary artery    HLD (hyperlipidemia)    Pulmonary HTN    Type 2 diabetes mellitus without complication, with long-term current use of insulin    Coronary artery disease involving native coronary artery of native heart without angina pectoris    Bipolar 1 disorder    Smoker    Gastroesophageal reflux disease, esophagitis presence not specified    Oxygen dependent    COPD (chronic obstructive pulmonary disease)    DM (diabetes mellitus)    CAD (coronary artery disease)    GERD (gastroesophageal reflux disease)    HLD (hyperlipidemia)    Insomnia    Polyarthritis    No significant past surgical history     p/w           IMP: This is  60 yr old obese man  with HTN, HLD, DM, CHF, CAD (w stents), COPD on 2.5L home 02 and night BIPAP (12/5 35%) presents from [facility] for shortness of breath. ICU was consulted and admitted pt for Acute on chronic hypercapnic respiratory failure requiring intubation.    ICU Course: The patient arrived on 7/25 to the ICU with severe acute hypoxic respiratory 2/2 COPD exacerbation and altered mental status. He was intubated on arrival for hypoxia and protection of airway. He also became hypotensive and developed thick secretions. A central line was placed and he was started on a levo drip and a course of cefepime. He was extubated on 7/28. He received a course of steroids, and inhalation treatment with albuterol and ipratropium. Patient is intermittently compliant with BIPAP. When not on BIPAP he is on 4 L NC. Patient is now off pressors. Central line and arterial line were removed prior to transfer. [place] was removed and a condom cath placed..  Moderate pul hypertension       Sugg  -continue BiPaP  10/ 5 with 40 % O2 at night and alternate with O2 via NC to maintain sat > 90%  -continue Symbicort and spiriva   -albuterol rescue inhaler   -continue nicotine patch 14  -i discussed compliance with med and PAP with pat . He is know to be non compliant   -advise pat to stop smoking , he promised to stop smoking   -moderate pul hypertension probable due to COPD and obesity   -monitor FS with coverage   -advise wt loss  -dvt/gi prophy

## 2021-07-30 NOTE — PROGRESS NOTE ADULT - ATTENDING COMMENTS
IMP: This is a 60 yr old morbid obese man active smoker with HTN, HLD, DM, CHF, CAD (w stents), chronic hypoxic resp failure due to  COPD  requiring supp O2 @ 2.5L / min via NC  night BIPAP (12/5 35%) presents from [facility] for shortness of breath. When EMS arrived pt was noted to be in respiratory distress and his portable O2 machine was uncharged and not running. Pt endorsed to ED physician his SOB has worsened overnight after being discharged 1 day PTA. Off note Pt was here last night for left ankle pain, He was diagnosed with non-displaced left lateral malleolus fracture. Pt was noted to be tachycardic and sob but refused admission and discharged with cast. Pt was  encephalopathic  in ED but Upon my examination pt was lethargic and unable to answer any questions. Pat was intubated for acute on chronic hypoxic hypercapnic resp failure .      ASSESSMENT;    - Acute on Chronic Hypoxic Hypercapnic Resp Failure  - COPD  - Acidosis .. Respiratory   - AMS/ Encephalopathy  - Shock ? cardiogenic   - DM uncontrol   - CAD/ CHF  - HTN , HTD  - Left Malleolar fx  - Active Smoker   - Morbid Obesity       Plan     -will intubate ( in icu )  -adjust vent as per ABG  -not a candidate for BiPaP due to AMS and lethargy   -sedated and pain control   -continue to titrate  pressors to maintain MAP>65  -hemodynamic support  -A-line for external hemodynamic monitoring   -trend cardiac enzyme  -solumedrol   -albuterol   -no antibx   -NGT for feed   -foly's cath placement .. urine retention   -Flomax  -Podiatry eval .. Dr Abreu   -monitor blood sugar with coverage   -nicotine patch   -TLC for meds and access  -A-line for hemodynamic monitoring
IMP: This is a 60 yr old morbid obese man active smoker with HTN, HLD, DM, CHF, CAD (w stents), chronic hypoxic resp failure due to  COPD  requiring supp O2 @ 2.5L / min via NC  night BIPAP (12/5 35%) presents from [facility] for shortness of breath. When EMS arrived pt was noted to be in respiratory distress and his portable O2 machine was uncharged and not running. Pt endorsed to ED physician his SOB has worsened overnight after being discharged 1 day PTA. Off note Pt was here last night for left ankle pain, He was diagnosed with non-displaced left lateral malleolus fracture. Pt was noted to be tachycardic and sob but refused admission and discharged with cast. Pt was  encephalopathic  in ED but Upon my examination pt was lethargic and unable to answer any questions. Pat was intubated for acute on chronic hypoxic hypercapnic resp failure .  ASSESSMENT;    - Acute on Chronic Hypoxic Hypercapnic Resp Failure  - COPD  - Acidosis .. Respiratory   - AMS/ Encephalopathy likely metabolic  - Hypotension - likely related to high dose sedatives   - DM uncontrol   - CAD/ CHF  - HTN , HTD  - Active Smoker   - Morbid Obesity       Plan   -Extubated 7/28, on NC oxygen  -Non compliant with nocturnal NIV  -hemodynamically stable  -Taper steroids  -albuterol RTC  -Aspiration precautions  -Short course of antibiotics for possible tracheitis 5 days total  -Flomax  -Podiatry eval appreciated, no fracture on xray  -monitor blood sugar with coverage and adjust for hyperglycemia  -nicotine patch   -Prognosis is guarded  -Transfer to medical service
IMP: This is a 60 yr old morbid obese man active smoker with HTN, HLD, DM, CHF, CAD (w stents), chronic hypoxic resp failure due to  COPD  requiring supp O2 @ 2.5L / min via NC  night BIPAP (12/5 35%) presents from [facility] for shortness of breath. When EMS arrived pt was noted to be in respiratory distress and his portable O2 machine was uncharged and not running. Pt endorsed to ED physician his SOB has worsened overnight after being discharged 1 day PTA. Off note Pt was here last night for left ankle pain, He was diagnosed with non-displaced left lateral malleolus fracture. Pt was noted to be tachycardic and sob but refused admission and discharged with cast. Pt was  encephalopathic  in ED but Upon my examination pt was lethargic and unable to answer any questions. Pat was intubated for acute on chronic hypoxic hypercapnic resp failure .  ASSESSMENT;    - Acute on Chronic Hypoxic Hypercapnic Resp Failure  - COPD  - Acidosis .. Respiratory   - AMS/ Encephalopathy likely metabolic  - Hypotension - likely related to high dose sedatives   - DM uncontrol   - CAD/ CHF  - HTN , HTD  - Active Smoker   - Morbid Obesity       Plan   -Tolerating SBT this morning, extubated today  -continue to titrate  pressors to maintain MAP>65  -hemodynamic support  -A-line for external hemodynamic monitoring   -solumedrol   -albuterol RTC  -NGT for feed, aspiration precautions  -foly's cath placement .. urine retention   -Flomax  -Podiatry eval   -monitor blood sugar with coverage and adjust for hyperglycemia  -nicotine patch   -TLC for meds and access  -A-line for hemodynamic monitoring  -D/c flotrac  -Prognosis is guarded
IMP: This is a 60 yr old morbid obese man active smoker with HTN, HLD, DM, CHF, CAD (w stents), chronic hypoxic resp failure due to  COPD  requiring supp O2 @ 2.5L / min via NC  night BIPAP (12/5 35%) presents from [facility] for shortness of breath. When EMS arrived pt was noted to be in respiratory distress and his portable O2 machine was uncharged and not running. Pt endorsed to ED physician his SOB has worsened overnight after being discharged 1 day PTA. Off note Pt was here last night for left ankle pain, He was diagnosed with non-displaced left lateral malleolus fracture. Pt was noted to be tachycardic and sob but refused admission and discharged with cast. Pt was  encephalopathic  in ED but Upon my examination pt was lethargic and unable to answer any questions. Pat was intubated for acute on chronic hypoxic hypercapnic resp failure .  ASSESSMENT;    - Acute on Chronic Hypoxic Hypercapnic Resp Failure  - COPD  - Acidosis .. Respiratory   - AMS/ Encephalopathy likely metabolic  - Hypotension - likely related to high dose sedatives   - DM uncontrol   - CAD/ CHF  - HTN , HTD  - Active Smoker   - Morbid Obesity       Plan   -Extubated 7/28, on NC oxygen  -Refusing Bipap support  -hemodynamically stable  -Taper steroids  -albuterol RTC  -Aspiration precautions  -Short course of antibiotics for possible tracheitis   -foly's cath placement .. urine retention   -Flomax  -Podiatry eval appreciated  -monitor blood sugar with coverage and adjust for hyperglycemia  -nicotine patch   -d/c central line, arterial line, mark, NGT  -Prognosis is guarded  -Transfer to medical service
IMP: This is a 60 yr old morbid obese man active smoker with HTN, HLD, DM, CHF, CAD (w stents), chronic hypoxic resp failure due to  COPD  requiring supp O2 @ 2.5L / min via NC  night BIPAP (12/5 35%) presents from [facility] for shortness of breath. When EMS arrived pt was noted to be in respiratory distress and his portable O2 machine was uncharged and not running. Pt endorsed to ED physician his SOB has worsened overnight after being discharged 1 day PTA. Off note Pt was here last night for left ankle pain, He was diagnosed with non-displaced left lateral malleolus fracture. Pt was noted to be tachycardic and sob but refused admission and discharged with cast. Pt was  encephalopathic  in ED but Upon my examination pt was lethargic and unable to answer any questions. Pat was intubated for acute on chronic hypoxic hypercapnic resp failure .  ASSESSMENT;    - Acute on Chronic Hypoxic Hypercapnic Resp Failure  - COPD  - Acidosis .. Respiratory   - AMS/ Encephalopathy likely metabolic  - Hypotension - likely related to high dose sedatives   - DM uncontrol   - CAD/ CHF  - HTN , HTD  - Active Smoker   - Morbid Obesity       Plan   -Mechanical ventilation with lung protective strategy, decreased tidal volumes  -adjust vent as per ABG  -Awakening trial, though did not tolerate SBT  -Precedex for vent syncrhony   -continue to titrate  pressors to maintain MAP>65  -hemodynamic support  -A-line for external hemodynamic monitoring   -solumedrol   -albuterol RTC  -NGT for feed, aspiration precautions  -foly's cath placement .. urine retention   -Flomax  -Podiatry eval   -monitor blood sugar with coverage and adjust for hyperglycemia  -nicotine patch   -TLC for meds and access  -A-line for hemodynamic monitoring  -D/c flotrac

## 2021-07-31 LAB
ALBUMIN SERPL ELPH-MCNC: 2.7 G/DL — LOW (ref 3.5–5)
ALP SERPL-CCNC: 48 U/L — SIGNIFICANT CHANGE UP (ref 40–120)
ALT FLD-CCNC: 49 U/L DA — SIGNIFICANT CHANGE UP (ref 10–60)
ANION GAP SERPL CALC-SCNC: 4 MMOL/L — LOW (ref 5–17)
AST SERPL-CCNC: 18 U/L — SIGNIFICANT CHANGE UP (ref 10–40)
BILIRUB SERPL-MCNC: 0.7 MG/DL — SIGNIFICANT CHANGE UP (ref 0.2–1.2)
BUN SERPL-MCNC: 16 MG/DL — SIGNIFICANT CHANGE UP (ref 7–18)
CALCIUM SERPL-MCNC: 8.2 MG/DL — LOW (ref 8.4–10.5)
CHLORIDE SERPL-SCNC: 103 MMOL/L — SIGNIFICANT CHANGE UP (ref 96–108)
CO2 SERPL-SCNC: 31 MMOL/L — SIGNIFICANT CHANGE UP (ref 22–31)
CREAT SERPL-MCNC: 0.34 MG/DL — LOW (ref 0.5–1.3)
GLUCOSE BLDC GLUCOMTR-MCNC: 189 MG/DL — HIGH (ref 70–99)
GLUCOSE BLDC GLUCOMTR-MCNC: 268 MG/DL — HIGH (ref 70–99)
GLUCOSE BLDC GLUCOMTR-MCNC: 285 MG/DL — HIGH (ref 70–99)
GLUCOSE BLDC GLUCOMTR-MCNC: 365 MG/DL — HIGH (ref 70–99)
GLUCOSE SERPL-MCNC: 142 MG/DL — HIGH (ref 70–99)
HCT VFR BLD CALC: 39.3 % — SIGNIFICANT CHANGE UP (ref 39–50)
HGB BLD-MCNC: 12.4 G/DL — LOW (ref 13–17)
MAGNESIUM SERPL-MCNC: 2.2 MG/DL — SIGNIFICANT CHANGE UP (ref 1.6–2.6)
MCHC RBC-ENTMCNC: 28 PG — SIGNIFICANT CHANGE UP (ref 27–34)
MCHC RBC-ENTMCNC: 31.6 GM/DL — LOW (ref 32–36)
MCV RBC AUTO: 88.7 FL — SIGNIFICANT CHANGE UP (ref 80–100)
MRSA PCR RESULT.: SIGNIFICANT CHANGE UP
NRBC # BLD: 0 /100 WBCS — SIGNIFICANT CHANGE UP (ref 0–0)
PHOSPHATE SERPL-MCNC: 3.8 MG/DL — SIGNIFICANT CHANGE UP (ref 2.5–4.5)
PLATELET # BLD AUTO: 170 K/UL — SIGNIFICANT CHANGE UP (ref 150–400)
POTASSIUM SERPL-MCNC: 4 MMOL/L — SIGNIFICANT CHANGE UP (ref 3.5–5.3)
POTASSIUM SERPL-SCNC: 4 MMOL/L — SIGNIFICANT CHANGE UP (ref 3.5–5.3)
PROT SERPL-MCNC: 5.5 G/DL — LOW (ref 6–8.3)
RBC # BLD: 4.43 M/UL — SIGNIFICANT CHANGE UP (ref 4.2–5.8)
RBC # FLD: 13.6 % — SIGNIFICANT CHANGE UP (ref 10.3–14.5)
S AUREUS DNA NOSE QL NAA+PROBE: SIGNIFICANT CHANGE UP
SODIUM SERPL-SCNC: 138 MMOL/L — SIGNIFICANT CHANGE UP (ref 135–145)
WBC # BLD: 7.03 K/UL — SIGNIFICANT CHANGE UP (ref 3.8–10.5)
WBC # FLD AUTO: 7.03 K/UL — SIGNIFICANT CHANGE UP (ref 3.8–10.5)

## 2021-07-31 RX ORDER — TRAMADOL HYDROCHLORIDE 50 MG/1
25 TABLET ORAL ONCE
Refills: 0 | Status: DISCONTINUED | OUTPATIENT
Start: 2021-07-31 | End: 2021-07-31

## 2021-07-31 RX ORDER — OXYCODONE AND ACETAMINOPHEN 5; 325 MG/1; MG/1
1 TABLET ORAL ONCE
Refills: 0 | Status: DISCONTINUED | OUTPATIENT
Start: 2021-07-31 | End: 2021-07-31

## 2021-07-31 RX ORDER — OXYCODONE AND ACETAMINOPHEN 5; 325 MG/1; MG/1
1 TABLET ORAL EVERY 6 HOURS
Refills: 0 | Status: DISCONTINUED | OUTPATIENT
Start: 2021-07-31 | End: 2021-08-02

## 2021-07-31 RX ADMIN — BUDESONIDE AND FORMOTEROL FUMARATE DIHYDRATE 2 PUFF(S): 160; 4.5 AEROSOL RESPIRATORY (INHALATION) at 11:15

## 2021-07-31 RX ADMIN — CEFEPIME 100 MILLIGRAM(S): 1 INJECTION, POWDER, FOR SOLUTION INTRAMUSCULAR; INTRAVENOUS at 06:20

## 2021-07-31 RX ADMIN — INSULIN GLARGINE 10 UNIT(S): 100 INJECTION, SOLUTION SUBCUTANEOUS at 21:55

## 2021-07-31 RX ADMIN — Medication 1 PATCH: at 07:56

## 2021-07-31 RX ADMIN — Medication 1 DROP(S): at 13:07

## 2021-07-31 RX ADMIN — Medication 1 PATCH: at 11:15

## 2021-07-31 RX ADMIN — Medication 1 DROP(S): at 06:20

## 2021-07-31 RX ADMIN — Medication 1 PUFF(S): at 11:15

## 2021-07-31 RX ADMIN — OXYCODONE AND ACETAMINOPHEN 1 TABLET(S): 5; 325 TABLET ORAL at 11:32

## 2021-07-31 RX ADMIN — ALBUTEROL 2 PUFF(S): 90 AEROSOL, METERED ORAL at 21:55

## 2021-07-31 RX ADMIN — Medication 150 MILLIGRAM(S): at 21:55

## 2021-07-31 RX ADMIN — Medication 3: at 21:54

## 2021-07-31 RX ADMIN — ALBUTEROL 2 PUFF(S): 90 AEROSOL, METERED ORAL at 16:44

## 2021-07-31 RX ADMIN — Medication 5: at 11:48

## 2021-07-31 RX ADMIN — ENOXAPARIN SODIUM 40 MILLIGRAM(S): 100 INJECTION SUBCUTANEOUS at 11:15

## 2021-07-31 RX ADMIN — OXYCODONE AND ACETAMINOPHEN 1 TABLET(S): 5; 325 TABLET ORAL at 16:43

## 2021-07-31 RX ADMIN — PANTOPRAZOLE SODIUM 40 MILLIGRAM(S): 20 TABLET, DELAYED RELEASE ORAL at 06:24

## 2021-07-31 RX ADMIN — CHLORHEXIDINE GLUCONATE 1 APPLICATION(S): 213 SOLUTION TOPICAL at 06:20

## 2021-07-31 RX ADMIN — TRAMADOL HYDROCHLORIDE 25 MILLIGRAM(S): 50 TABLET ORAL at 04:26

## 2021-07-31 RX ADMIN — Medication 1: at 08:08

## 2021-07-31 RX ADMIN — SENNA PLUS 2 TABLET(S): 8.6 TABLET ORAL at 21:55

## 2021-07-31 RX ADMIN — Medication 1 DROP(S): at 22:00

## 2021-07-31 RX ADMIN — TRAMADOL HYDROCHLORIDE 25 MILLIGRAM(S): 50 TABLET ORAL at 05:57

## 2021-07-31 RX ADMIN — Medication 1 PUFF(S): at 16:44

## 2021-07-31 RX ADMIN — Medication 1 PATCH: at 11:27

## 2021-07-31 RX ADMIN — RISPERIDONE 1 MILLIGRAM(S): 4 TABLET ORAL at 11:15

## 2021-07-31 RX ADMIN — Medication 81 MILLIGRAM(S): at 11:15

## 2021-07-31 RX ADMIN — ALBUTEROL 2 PUFF(S): 90 AEROSOL, METERED ORAL at 08:08

## 2021-07-31 RX ADMIN — BUDESONIDE AND FORMOTEROL FUMARATE DIHYDRATE 2 PUFF(S): 160; 4.5 AEROSOL RESPIRATORY (INHALATION) at 21:55

## 2021-07-31 RX ADMIN — CEFEPIME 100 MILLIGRAM(S): 1 INJECTION, POWDER, FOR SOLUTION INTRAMUSCULAR; INTRAVENOUS at 17:06

## 2021-07-31 RX ADMIN — Medication 40 MILLIGRAM(S): at 06:20

## 2021-07-31 RX ADMIN — Medication 3: at 17:05

## 2021-07-31 RX ADMIN — OXYCODONE AND ACETAMINOPHEN 1 TABLET(S): 5; 325 TABLET ORAL at 12:32

## 2021-07-31 RX ADMIN — Medication 1 PATCH: at 19:01

## 2021-07-31 RX ADMIN — Medication 1 PUFF(S): at 21:56

## 2021-07-31 NOTE — CHART NOTE - NSCHARTNOTEFT_GEN_A_CORE
EVENT:     SUBJECTIVE:    OBJECTIVE:  Vital Signs Last 24 Hrs  T(C): 37.1 (30 Jul 2021 20:55), Max: 37.1 (30 Jul 2021 13:00)  T(F): 98.7 (30 Jul 2021 20:55), Max: 98.7 (30 Jul 2021 13:00)  HR: 95 (30 Jul 2021 20:55) (78 - 105)  BP: 140/83 (30 Jul 2021 20:55) (113/64 - 140/83)  BP(mean): 85 (30 Jul 2021 09:00) (75 - 90)  RR: 18 (30 Jul 2021 20:55) (18 - 28)  SpO2: 93% (30 Jul 2021 20:55) (91% - 96%)    PHYSICAL EXAM:  Neuro: Awake and alert, oriented to person, place, and time  Cardiovascular: + S1, S2, no murmurs, rubs, or bruits  Respiratory: clear to auscultation bilaterally with good air entry   GI: Abdomen soft, non-tender, bowel sounds present   : Non distended;   Skin: warm and dry; no rash      LABS:                        12.2   8.46  )-----------( 169      ( 30 Jul 2021 05:37 )             39.6     07-30    140  |  104  |  19<H>  ----------------------------<  133<H>  3.9   |  32<H>  |  0.41<L>    Ca    8.2<L>      30 Jul 2021 04:26  Phos  3.1     07-30  Mg     2.3     07-30    TPro  5.3<L>  /  Alb  2.5<L>  /  TBili  0.6  /  DBili  x   /  AST  10  /  ALT  20  /  AlkPhos  48  07-29        EKG:   IMAGING:    ASSESSMENT:  HPI:  61 yo M with PMH including HTN, HLD, DM, CHF, CAD (w stents), COPD on 2.5L home 02 and night BIPAP (12/5 35%) presents from [facility] for shortness of breath. When EMS arrived pt was noted to be in respiratory distress and his portable O2 machine was uncharged and not running. Pt endorsed to ED physician his SOB has worsened overnight after being discharged 1 day PTA. Off note Pt was here last night for left ankle pain, He was diagnosed with non-displaced left lateral malleolus fracture. Pt was noted to be tachycardic and sob but refused admission and discharged with cast. Pt was Ax0 x3 in ED but Upon my examination pt was lethargic and unable to answer any questions.  (25 Jul 2021 09:56)      PLAN:     FOLLOW UP / RESULT: EVENT: Paged by RN, pt c/o back pain    HPI:  59 yo M with PMH including HTN, HLD, DM, CHF, CAD (w stents), COPD on 2.5L home 02 and night BIPAP (12/5 35%) presents from [facility] for shortness of breath. ICU was consulted and admitted pt for Acute on chronic hypercapnic respiratory failure requiring intubation.    ICU Course: The patient arrived on 7/25 to the ICU with severe acute hypoxic respiratory 2/2 COPD exacerbation and altered mental status. He was intubated on arrival for hypoxia and protection of airway. He also became hypotensive and developed thick secretions. A central line was placed and he was started on a levo drip and a course of cefepime. He was extubated on 7/28. He received a course of steroids, and inhalation treatment with albuterol and ipratropium. Patient is intermittently compliant with BIPAP. When not on BIPAP he is on 4 L NC. Patient is now off pressors. Central line and arterial line were removed prior to transfer. Tejada was removed and a condom cath placed. Downgraded to medicine 7/30.     SUBJECTIVE: Pt reports moderate back pain 6/10 and is unable to rest comfortable--requesting medication for relief    OBJECTIVE:  Vital Signs Last 24 Hrs  T(C): 37.1 (30 Jul 2021 20:55), Max: 37.1 (30 Jul 2021 13:00)  T(F): 98.7 (30 Jul 2021 20:55), Max: 98.7 (30 Jul 2021 13:00)  HR: 95 (30 Jul 2021 20:55) (78 - 105)  BP: 140/83 (30 Jul 2021 20:55) (113/64 - 140/83)  BP(mean): 85 (30 Jul 2021 09:00) (75 - 90)  RR: 18 (30 Jul 2021 20:55) (18 - 28)  SpO2: 93% (30 Jul 2021 20:55) (91% - 96%)    PHYSICAL EXAM:  Neuro: Awake and alert, oriented to person, place, and time  Cardiovascular: + S1, S2, no murmurs, rubs, or bruits  Respiratory: clear to auscultation bilaterally with good air entry   GI: Obese; Abdomen soft, non-tender, bowel sounds present   : Non distended;   Skin: warm and dry; no rash      LABS:                        12.2   8.46  )-----------( 169      ( 30 Jul 2021 05:37 )             39.6     07-30    140  |  104  |  19<H>  ----------------------------<  133<H>  3.9   |  32<H>  |  0.41<L>    Ca    8.2<L>      30 Jul 2021 04:26  Phos  3.1     07-30  Mg     2.3     07-30    TPro  5.3<L>  /  Alb  2.5<L>  /  TBili  0.6  /  DBili  x   /  AST  10  /  ALT  20  /  AlkPhos  48  07-29        EKG:   IMAGING:    ASSESSMENT: Back pain likely chronic, exacerbated by immobility    PLAN:     -Tramadol 25 mg PO x 1  -Continue current/supportive care    FOLLOW UP / RESULT:    -Monitor effectiveness of pain med  -Reassess pain per hospital policy

## 2021-07-31 NOTE — PROGRESS NOTE ADULT - SUBJECTIVE AND OBJECTIVE BOX
Patient is a 61y old  Male who presents with a chief complaint of AHHRF (30 Jul 2021 18:41)    PATIENT IS SEEN AND EXAMINED IN MEDICAL FLOOR.    NGT [    ]    MT [   ]      GT [   ]    ALLERGIES:  No Known Allergies      Daily     Daily     VITALS:    Vital Signs Last 24 Hrs  T(C): 36.6 (31 Jul 2021 13:39), Max: 37.1 (30 Jul 2021 20:55)  T(F): 97.8 (31 Jul 2021 13:39), Max: 98.7 (30 Jul 2021 20:55)  HR: 110 (31 Jul 2021 13:39) (76 - 110)  BP: 115/74 (31 Jul 2021 13:39) (115/74 - 140/83)  BP(mean): --  RR: 18 (31 Jul 2021 13:39) (16 - 18)  SpO2: 96% (31 Jul 2021 13:39) (93% - 97%)    LABS:    CBC Full  -  ( 31 Jul 2021 06:52 )  WBC Count : 7.03 K/uL  RBC Count : 4.43 M/uL  Hemoglobin : 12.4 g/dL  Hematocrit : 39.3 %  Platelet Count - Automated : 170 K/uL  Mean Cell Volume : 88.7 fl  Mean Cell Hemoglobin : 28.0 pg  Mean Cell Hemoglobin Concentration : 31.6 gm/dL  Auto Neutrophil # : x  Auto Lymphocyte # : x  Auto Monocyte # : x  Auto Eosinophil # : x  Auto Basophil # : x  Auto Neutrophil % : x  Auto Lymphocyte % : x  Auto Monocyte % : x  Auto Eosinophil % : x  Auto Basophil % : x      07-31    138  |  103  |  16  ----------------------------<  142<H>  4.0   |  31  |  0.34<L>    Ca    8.2<L>      31 Jul 2021 06:52  Phos  3.8     07-31  Mg     2.2     07-31    TPro  5.5<L>  /  Alb  2.7<L>  /  TBili  0.7  /  DBili  x   /  AST  18  /  ALT  49  /  AlkPhos  48  07-31    CAPILLARY BLOOD GLUCOSE      POCT Blood Glucose.: 285 mg/dL (31 Jul 2021 16:44)  POCT Blood Glucose.: 365 mg/dL (31 Jul 2021 11:44)  POCT Blood Glucose.: 189 mg/dL (31 Jul 2021 08:02)  POCT Blood Glucose.: 227 mg/dL (30 Jul 2021 21:13)  POCT Blood Glucose.: 216 mg/dL (30 Jul 2021 17:10)        LIVER FUNCTIONS - ( 31 Jul 2021 06:52 )  Alb: 2.7 g/dL / Pro: 5.5 g/dL / ALK PHOS: 48 U/L / ALT: 49 U/L DA / AST: 18 U/L / GGT: x           Creatinine Trend: 0.34<--, 0.41<--, 0.44<--, 0.51<--, 0.52<--, 0.55<--  I&O's Summary    30 Jul 2021 07:01  -  31 Jul 2021 07:00  --------------------------------------------------------  IN: 150 mL / OUT: 300 mL / NET: -150 mL        ABG - ( 30 Jul 2021 04:46 )  pH, Arterial: 7.44  pH, Blood: x     /  pCO2: 49    /  pO2: 83    / HCO3: 33    / Base Excess: 7.8   /  SaO2: 97                      MEDICATIONS:    MEDICATIONS  (STANDING):  ALBUTerol    90 MICROgram(s) HFA Inhaler 2 Puff(s) Inhalation every 6 hours  artificial  tears Solution 1 Drop(s) Both EYES three times a day  aspirin  chewable 81 milliGRAM(s) Oral daily  budesonide 160 MICROgram(s)/formoterol 4.5 MICROgram(s) Inhaler 2 Puff(s) Inhalation two times a day  cefepime   IVPB 2000 milliGRAM(s) IV Intermittent every 12 hours  chlorhexidine 2% Cloths 1 Application(s) Topical <User Schedule>  dextrose 50% Injectable 12.5 Gram(s) IV Push once  enoxaparin Injectable 40 milliGRAM(s) SubCutaneous daily  insulin glargine Injectable (LANTUS) 10 Unit(s) SubCutaneous at bedtime  insulin lispro (ADMELOG) corrective regimen sliding scale   SubCutaneous Before meals and at bedtime  ipratropium 17 MICROgram(s) HFA Inhaler 1 Puff(s) Inhalation every 6 hours  nicotine -  14 mG/24Hr(s) Patch 1 patch Transdermal daily  pantoprazole    Tablet 40 milliGRAM(s) Oral before breakfast  polyethylene glycol 3350 17 Gram(s) Oral daily  predniSONE   Tablet 40 milliGRAM(s) Oral daily  risperiDONE   Tablet 1 milliGRAM(s) Oral daily  senna 2 Tablet(s) Oral at bedtime  traZODone 150 milliGRAM(s) Oral at bedtime      MEDICATIONS  (PRN):  oxycodone    5 mG/acetaminophen 325 mG 1 Tablet(s) Oral every 6 hours PRN Severe Pain (7 - 10)  sodium chloride 0.9% lock flush 10 milliLiter(s) IV Push every 1 hour PRN Pre/post blood products, medications, blood draw, and to maintain line patency        REVIEW OF SYSTEMS:                           ALL ROS DONE [ X   ]      CONSTITUTIONAL:  LETHARGIC [   ], FEVER [   ], UNRESPONSIVE [   ]  CVS:  CP  [   ], SOB, [   ], PALPITATIONS [   ], DIZZYNESS [   ]  RS: COUGH [   ], SPUTUM [   ]  GI: ABDOMINAL PAIN [   ], NAUSEA [   ], VOMITINGS [   ], DIARRHEA [   ], CONSTIPATION [   ]  :  DYSURIA [   ], NOCTURIA [   ], INCREASED FREQUENCY [   ], DRIBLING [   ],  SKELETAL: PAINFUL JOINTS [   ], SWOLLEN JOINTS [   ], NECK ACHE [   ], LOW BACK ACHE [   ],  SKIN : ULCERS [   ], RASH [   ], ITCHING [   ]  CNS: HEAD ACHE [   ], DOUBLE VISION [   ], BLURRED VISION [   ], AMS / CONFUSION [   ], SEIZURES [   ], WEAKNESS [   ],TINGLING / NUMBNESS [   ]        PHYSICAL EXAMINATION:  GENERAL APPEARANCE: NO DISTRESS  HEENT:  NO PALLOR, NO  JVD,  NO   NODES, NECK SUPPLE  CVS: S1 +, S2 +,   RS: AEEB,  OCCASIONAL  RALES +,   RHONCHI, ON VENTIMASK  ABD: SOFT, NT, NO, BS +    NGTUBE+  EXT: NO PE  SKIN: WARM,   CVC+  SKELETAL:  ROM ACCEPTABLE, LEFT ANKLE   CNS:  AAO X 2-3, NO  DEFICITS    RADIOLOGY :    EXAM:  XR CHEST PORTABLE URGENT 1V                            PROCEDURE DATE:  07/25/2021          INTERPRETATION:  History: Shortness of breath. Difficulty breathing.    FINDINGS: Frontal chest.    COMPARISON: 7/24/2021.    Exam is limited due to collimation at the lung bases.    Heart size may be exaggerated by AP technique. Mild bronchovascular crowding at the bases. There is no focal lung consolidation or sizable pleural effusion. No significant osseous abnormality. Degenerative change of the spine.    IMPRESSION:    Unremarkable frontal chest.    ==================================================    EXAM:  XR ANKLE COMP MIN 3 VIEWS LT                            PROCEDURE DATE:  07/24/2021          INTERPRETATION:  History: Fall, ankle pain.    FINDINGS: Frontal, lateral and oblique projections of the left ankle.    Diffuse soft tissue swelling. Osteopenia. No fracture or dislocation. Ankle mortise is aligned. Extensive vascular calcification.    IMPRESSION:    No fracture or dislocation left ankle appreciated.      ASSESSMENT :       PLAN:  HPI:  59 yo M with PMH including HTN, HLD, DM, CHF, CAD (w stents), COPD on 2.5L home 02 and night BIPAP (12/5 35%) presents from [facility] for shortness of breath. When EMS arrived pt was noted to be in respiratory distress and his portable O2 machine was uncharged and not running. Pt endorsed to ED physician his SOB has worsened overnight after being discharged 1 day PTA. Off note Pt was here last night for left ankle pain, He was diagnosed with non-displaced left lateral malleolus fracture. Pt was noted to be tachycardic and sob but refused admission and discharged with cast. Pt was Ax0 x3 in ED but Upon my examination pt was lethargic and unable to answer any questions.  (25 Jul 2021 09:56)    # ACUTE ON CHRONIC HYPOXIC HYPERCAPNEIC RESPIRATORY FAILURE, EMPHYSEMA - S/P INTUBATION, ON PREDNISONE [TAPERED FROM SOLUMEDROL], ON DUONEB, CEFEPIME   - S/P EXTUBATION AND IS ON NC   - COUNSELLED ON BIPAP COMPLIANCE  # MORBIDLY OBESE WITH RESTRICTIVE LUNG DISEASE, SUSPECT OBSTRUCTIVE SLEEP APNOEA , COPD ON O2 NC AT BASELINE  # NONDISPLACED LEFT LATERAL MALLEOLUS FRACTURE - PODIATRY CONSULT  - F/U CT LEFT FOOT  # AMS , ACUTE METABOLIC ENCEPHALOPATHY - SUSPECT S/T HYPERCAPNEA - RESOLVED  # HTN  # HLD  # CHRONIC CHF  # CAD S/P STENT  # DM  # TOBACCO ABUSE  #  GI AND DVT PROPHYLAXIS    DR. REFUGIO CABELLO   Patient is a 61y old  Male who presents with a chief complaint of AHHRF (30 Jul 2021 18:41)    PATIENT IS SEEN AND EXAMINED IN MEDICAL FLOOR.    NGT [    ]    MT [   ]      GT [   ]    ALLERGIES:  No Known Allergies      Daily     Daily     VITALS:    Vital Signs Last 24 Hrs  T(C): 36.6 (31 Jul 2021 13:39), Max: 37.1 (30 Jul 2021 20:55)  T(F): 97.8 (31 Jul 2021 13:39), Max: 98.7 (30 Jul 2021 20:55)  HR: 110 (31 Jul 2021 13:39) (76 - 110)  BP: 115/74 (31 Jul 2021 13:39) (115/74 - 140/83)  BP(mean): --  RR: 18 (31 Jul 2021 13:39) (16 - 18)  SpO2: 96% (31 Jul 2021 13:39) (93% - 97%)    LABS:    CBC Full  -  ( 31 Jul 2021 06:52 )  WBC Count : 7.03 K/uL  RBC Count : 4.43 M/uL  Hemoglobin : 12.4 g/dL  Hematocrit : 39.3 %  Platelet Count - Automated : 170 K/uL  Mean Cell Volume : 88.7 fl  Mean Cell Hemoglobin : 28.0 pg  Mean Cell Hemoglobin Concentration : 31.6 gm/dL  Auto Neutrophil # : x  Auto Lymphocyte # : x  Auto Monocyte # : x  Auto Eosinophil # : x  Auto Basophil # : x  Auto Neutrophil % : x  Auto Lymphocyte % : x  Auto Monocyte % : x  Auto Eosinophil % : x  Auto Basophil % : x      07-31    138  |  103  |  16  ----------------------------<  142<H>  4.0   |  31  |  0.34<L>    Ca    8.2<L>      31 Jul 2021 06:52  Phos  3.8     07-31  Mg     2.2     07-31    TPro  5.5<L>  /  Alb  2.7<L>  /  TBili  0.7  /  DBili  x   /  AST  18  /  ALT  49  /  AlkPhos  48  07-31    CAPILLARY BLOOD GLUCOSE      POCT Blood Glucose.: 285 mg/dL (31 Jul 2021 16:44)  POCT Blood Glucose.: 365 mg/dL (31 Jul 2021 11:44)  POCT Blood Glucose.: 189 mg/dL (31 Jul 2021 08:02)  POCT Blood Glucose.: 227 mg/dL (30 Jul 2021 21:13)  POCT Blood Glucose.: 216 mg/dL (30 Jul 2021 17:10)        LIVER FUNCTIONS - ( 31 Jul 2021 06:52 )  Alb: 2.7 g/dL / Pro: 5.5 g/dL / ALK PHOS: 48 U/L / ALT: 49 U/L DA / AST: 18 U/L / GGT: x           Creatinine Trend: 0.34<--, 0.41<--, 0.44<--, 0.51<--, 0.52<--, 0.55<--  I&O's Summary    30 Jul 2021 07:01  -  31 Jul 2021 07:00  --------------------------------------------------------  IN: 150 mL / OUT: 300 mL / NET: -150 mL        ABG - ( 30 Jul 2021 04:46 )  pH, Arterial: 7.44  pH, Blood: x     /  pCO2: 49    /  pO2: 83    / HCO3: 33    / Base Excess: 7.8   /  SaO2: 97                      MEDICATIONS:    MEDICATIONS  (STANDING):  ALBUTerol    90 MICROgram(s) HFA Inhaler 2 Puff(s) Inhalation every 6 hours  artificial  tears Solution 1 Drop(s) Both EYES three times a day  aspirin  chewable 81 milliGRAM(s) Oral daily  budesonide 160 MICROgram(s)/formoterol 4.5 MICROgram(s) Inhaler 2 Puff(s) Inhalation two times a day  cefepime   IVPB 2000 milliGRAM(s) IV Intermittent every 12 hours  chlorhexidine 2% Cloths 1 Application(s) Topical <User Schedule>  dextrose 50% Injectable 12.5 Gram(s) IV Push once  enoxaparin Injectable 40 milliGRAM(s) SubCutaneous daily  insulin glargine Injectable (LANTUS) 10 Unit(s) SubCutaneous at bedtime  insulin lispro (ADMELOG) corrective regimen sliding scale   SubCutaneous Before meals and at bedtime  ipratropium 17 MICROgram(s) HFA Inhaler 1 Puff(s) Inhalation every 6 hours  nicotine -  14 mG/24Hr(s) Patch 1 patch Transdermal daily  pantoprazole    Tablet 40 milliGRAM(s) Oral before breakfast  polyethylene glycol 3350 17 Gram(s) Oral daily  predniSONE   Tablet 40 milliGRAM(s) Oral daily  risperiDONE   Tablet 1 milliGRAM(s) Oral daily  senna 2 Tablet(s) Oral at bedtime  traZODone 150 milliGRAM(s) Oral at bedtime      MEDICATIONS  (PRN):  oxycodone    5 mG/acetaminophen 325 mG 1 Tablet(s) Oral every 6 hours PRN Severe Pain (7 - 10)  sodium chloride 0.9% lock flush 10 milliLiter(s) IV Push every 1 hour PRN Pre/post blood products, medications, blood draw, and to maintain line patency        REVIEW OF SYSTEMS:                           ALL ROS DONE [ X   ]      CONSTITUTIONAL:  LETHARGIC [   ], FEVER [   ], UNRESPONSIVE [   ]  CVS:  CP  [   ], SOB, [   ], PALPITATIONS [   ], DIZZYNESS [   ]  RS: COUGH [   ], SPUTUM [   ]  GI: ABDOMINAL PAIN [   ], NAUSEA [   ], VOMITINGS [   ], DIARRHEA [   ], CONSTIPATION [   ]  :  DYSURIA [   ], NOCTURIA [   ], INCREASED FREQUENCY [   ], DRIBLING [   ],  SKELETAL: PAINFUL JOINTS [   ], SWOLLEN JOINTS [   ], NECK ACHE [   ], LOW BACK ACHE [   ],  SKIN : ULCERS [   ], RASH [   ], ITCHING [   ]  CNS: HEAD ACHE [   ], DOUBLE VISION [   ], BLURRED VISION [   ], AMS / CONFUSION [   ], SEIZURES [   ], WEAKNESS [   ],TINGLING / NUMBNESS [   ]        PHYSICAL EXAMINATION:  GENERAL APPEARANCE: NO DISTRESS  HEENT:  NO PALLOR, NO  JVD,  NO   NODES, NECK SUPPLE  CVS: S1 +, S2 +,   RS: AEEB,  OCCASIONAL  RALES +,   RHONCHI, ON VENTIMASK  ABD: SOFT, NT, NO, BS +    NGTUBE+  EXT: NO PE  SKIN: WARM,   CVC+  SKELETAL:  ROM ACCEPTABLE, LEFT ANKLE   CNS:  AAO X 2-3, NO  DEFICITS    RADIOLOGY :    EXAM:  XR CHEST PORTABLE URGENT 1V                            PROCEDURE DATE:  07/25/2021          INTERPRETATION:  History: Shortness of breath. Difficulty breathing.    FINDINGS: Frontal chest.    COMPARISON: 7/24/2021.    Exam is limited due to collimation at the lung bases.    Heart size may be exaggerated by AP technique. Mild bronchovascular crowding at the bases. There is no focal lung consolidation or sizable pleural effusion. No significant osseous abnormality. Degenerative change of the spine.    IMPRESSION:    Unremarkable frontal chest.    ==================================================    EXAM:  XR ANKLE COMP MIN 3 VIEWS LT                            PROCEDURE DATE:  07/24/2021          INTERPRETATION:  History: Fall, ankle pain.    FINDINGS: Frontal, lateral and oblique projections of the left ankle.    Diffuse soft tissue swelling. Osteopenia. No fracture or dislocation. Ankle mortise is aligned. Extensive vascular calcification.    IMPRESSION:    No fracture or dislocation left ankle appreciated.      ASSESSMENT :       PLAN:  HPI:  61 yo M with PMH including HTN, HLD, DM, CHF, CAD (w stents), COPD on 2.5L home 02 and night BIPAP (12/5 35%) presents from [facility] for shortness of breath. When EMS arrived pt was noted to be in respiratory distress and his portable O2 machine was uncharged and not running. Pt endorsed to ED physician his SOB has worsened overnight after being discharged 1 day PTA. Off note Pt was here last night for left ankle pain, He was diagnosed with non-displaced left lateral malleolus fracture. Pt was noted to be tachycardic and sob but refused admission and discharged with cast. Pt was Ax0 x3 in ED but Upon my examination pt was lethargic and unable to answer any questions.  (25 Jul 2021 09:56)    # ACUTE ON CHRONIC HYPOXIC HYPERCAPNEIC RESPIRATORY FAILURE, EMPHYSEMA - S/P INTUBATION, ON PREDNISONE [TAPERED FROM SOLUMEDROL], ON DUONEB, CEFEPIME  ( COUNSELLED PATIENT TO QUIT SMOKING - ACTIVE SMOKER )   - S/P EXTUBATION AND IS ON NC   - COUNSELLED ON BIPAP COMPLIANCE  # MORBIDLY OBESE WITH RESTRICTIVE LUNG DISEASE, SUSPECT OBSTRUCTIVE SLEEP APNOEA , COPD ON O2 NC AT BASELINE    # NONDISPLACED LEFT LATERAL MALLEOLUS FRACTURE - PODIATRY CONSULT - OBTAIN CT OF LEFT ANKLE ,  OBTAIN PT AND OT EVALUATION   #  CHRONIC LOW BACK ACHE AND PANIC DISORDER - START PERCOCET - HOME DOSE     # AMS , ACUTE METABOLIC ENCEPHALOPATHY - SUSPECT S/T HYPERCAPNEA - RESOLVED  # HTN  # HLD  # CHRONIC CHF  # CAD S/P STENT  # DM  # TOBACCO ABUSE  #  GI AND DVT PROPHYLAXIS    DR. REFUGIO CABELLO

## 2021-07-31 NOTE — PROGRESS NOTE ADULT - SUBJECTIVE AND OBJECTIVE BOX
Time of Visit:  Patient seen and examined.     MEDICATIONS  (STANDING):  ALBUTerol    90 MICROgram(s) HFA Inhaler 2 Puff(s) Inhalation every 6 hours  artificial  tears Solution 1 Drop(s) Both EYES three times a day  aspirin  chewable 81 milliGRAM(s) Oral daily  budesonide 160 MICROgram(s)/formoterol 4.5 MICROgram(s) Inhaler 2 Puff(s) Inhalation two times a day  cefepime   IVPB 2000 milliGRAM(s) IV Intermittent every 12 hours  chlorhexidine 2% Cloths 1 Application(s) Topical <User Schedule>  dextrose 50% Injectable 12.5 Gram(s) IV Push once  enoxaparin Injectable 40 milliGRAM(s) SubCutaneous daily  insulin glargine Injectable (LANTUS) 10 Unit(s) SubCutaneous at bedtime  insulin lispro (ADMELOG) corrective regimen sliding scale   SubCutaneous Before meals and at bedtime  ipratropium 17 MICROgram(s) HFA Inhaler 1 Puff(s) Inhalation every 6 hours  nicotine -  14 mG/24Hr(s) Patch 1 patch Transdermal daily  pantoprazole    Tablet 40 milliGRAM(s) Oral before breakfast  polyethylene glycol 3350 17 Gram(s) Oral daily  predniSONE   Tablet 40 milliGRAM(s) Oral daily  risperiDONE   Tablet 1 milliGRAM(s) Oral daily  senna 2 Tablet(s) Oral at bedtime  traZODone 150 milliGRAM(s) Oral at bedtime      MEDICATIONS  (PRN):  oxycodone    5 mG/acetaminophen 325 mG 1 Tablet(s) Oral every 6 hours PRN Severe Pain (7 - 10)  sodium chloride 0.9% lock flush 10 milliLiter(s) IV Push every 1 hour PRN Pre/post blood products, medications, blood draw, and to maintain line patency       Medications up to date at time of exam.      PHYSICAL EXAMINATION:  Patient has no new complaints.  GENERAL: The patient is a well-developed, well-nourished, in no apparent distress.     Vital Signs Last 24 Hrs  T(C): 36.6 (31 Jul 2021 13:39), Max: 37.1 (30 Jul 2021 20:55)  T(F): 97.8 (31 Jul 2021 13:39), Max: 98.7 (30 Jul 2021 20:55)  HR: 110 (31 Jul 2021 13:39) (76 - 110)  BP: 115/74 (31 Jul 2021 13:39) (115/74 - 140/83)  BP(mean): --  RR: 18 (31 Jul 2021 13:39) (16 - 18)  SpO2: 96% (31 Jul 2021 13:39) (93% - 97%)   (if applicable)    Chest Tube (if applicable)    HEENT: Head is normocephalic and atraumatic. Extraocular muscles are intact. Mucous membranes are moist.     NECK: Supple, no palpable adenopathy.    LUNGS: Clear to auscultation, no wheezing, rales, or rhonchi.    HEART: Regular rate and rhythm without murmur.    ABDOMEN: Soft, nontender, and nondistended.  No hepatosplenomegaly is noted.    : No painful voiding, no pelvic pain    EXTREMITIES: Without any cyanosis, clubbing, rash, lesions or edema.    NEUROLOGIC: Awake, alert, oriented, grossly intact    SKIN: Warm, dry, good turgor.      LABS:                        12.4   7.03  )-----------( 170      ( 31 Jul 2021 06:52 )             39.3     07-31    138  |  103  |  16  ----------------------------<  142<H>  4.0   |  31  |  0.34<L>    Ca    8.2<L>      31 Jul 2021 06:52  Phos  3.8     07-31  Mg     2.2     07-31    TPro  5.5<L>  /  Alb  2.7<L>  /  TBili  0.7  /  DBili  x   /  AST  18  /  ALT  49  /  AlkPhos  48  07-31        ABG - ( 30 Jul 2021 04:46 )  pH, Arterial: 7.44  pH, Blood: x     /  pCO2: 49    /  pO2: 83    / HCO3: 33    / Base Excess: 7.8   /  SaO2: 97                              MICROBIOLOGY: (if applicable)    RADIOLOGY & ADDITIONAL STUDIES:  EKG:   CXR:  ECHO:    IMPRESSION: 61y Male PAST MEDICAL & SURGICAL HISTORY:  COPD (chronic obstructive pulmonary disease)    Stented coronary artery    HLD (hyperlipidemia)    Pulmonary HTN    Type 2 diabetes mellitus without complication, with long-term current use of insulin    Coronary artery disease involving native coronary artery of native heart without angina pectoris    Bipolar 1 disorder    Smoker    Gastroesophageal reflux disease, esophagitis presence not specified    Oxygen dependent    COPD (chronic obstructive pulmonary disease)    DM (diabetes mellitus)    CAD (coronary artery disease)    GERD (gastroesophageal reflux disease)    HLD (hyperlipidemia)    Insomnia    Polyarthritis    No significant past surgical history     p/w         IMP: This is  60 yr old obese man  with HTN, HLD, DM, CHF, CAD (w stents), COPD on 2.5L home 02 and night BIPAP (12/5 35%) presents from [facility] for shortness of breath. ICU was consulted and admitted pt for Acute on chronic hypercapnic respiratory failure requiring intubation.    ICU Course: The patient arrived on 7/25 to the ICU with severe acute hypoxic respiratory 2/2 COPD exacerbation and altered mental status. He was intubated on arrival for hypoxia and protection of airway. He also became hypotensive and developed thick secretions. A central line was placed and he was started on a levo drip and a course of cefepime. He was extubated on 7/28. He received a course of steroids, and inhalation treatment with albuterol and ipratropium. Patient is intermittently compliant with BIPAP. When not on BIPAP he is on 4 L NC. Patient is now off pressors. Central line and arterial line were removed prior to transfer. Tejada was removed and a condom cath placed..  Moderate pul hypertension       Sugg  -continue BiPaP  10/ 5 with 40 % O2 at night and alternate with O2 via NC to maintain sat > 90%  -continue Symbicort and spiriva   -albuterol rescue inhaler   -continue nicotine patch 14  -i discussed compliance with med and PAP with pat . He is known to be non compliant   -advise pat to stop smoking , he promised to stop smoking   -moderate pul hypertension probable due to COPD and obesity   -monitor FS with coverage   -advise wt loss  -dvt/gi prophy    discussed with NP on unit

## 2021-08-01 ENCOUNTER — TRANSCRIPTION ENCOUNTER (OUTPATIENT)
Age: 61
End: 2021-08-01

## 2021-08-01 LAB
ANION GAP SERPL CALC-SCNC: 2 MMOL/L — LOW (ref 5–17)
BUN SERPL-MCNC: 18 MG/DL — SIGNIFICANT CHANGE UP (ref 7–18)
CALCIUM SERPL-MCNC: 8.5 MG/DL — SIGNIFICANT CHANGE UP (ref 8.4–10.5)
CHLORIDE SERPL-SCNC: 101 MMOL/L — SIGNIFICANT CHANGE UP (ref 96–108)
CO2 SERPL-SCNC: 34 MMOL/L — HIGH (ref 22–31)
CREAT SERPL-MCNC: 0.74 MG/DL — SIGNIFICANT CHANGE UP (ref 0.5–1.3)
GLUCOSE BLDC GLUCOMTR-MCNC: 258 MG/DL — HIGH (ref 70–99)
GLUCOSE BLDC GLUCOMTR-MCNC: 331 MG/DL — HIGH (ref 70–99)
GLUCOSE BLDC GLUCOMTR-MCNC: 334 MG/DL — HIGH (ref 70–99)
GLUCOSE BLDC GLUCOMTR-MCNC: 415 MG/DL — HIGH (ref 70–99)
GLUCOSE SERPL-MCNC: 332 MG/DL — HIGH (ref 70–99)
HCT VFR BLD CALC: 41.3 % — SIGNIFICANT CHANGE UP (ref 39–50)
HGB BLD-MCNC: 12.8 G/DL — LOW (ref 13–17)
MCHC RBC-ENTMCNC: 28 PG — SIGNIFICANT CHANGE UP (ref 27–34)
MCHC RBC-ENTMCNC: 31 GM/DL — LOW (ref 32–36)
MCV RBC AUTO: 90.4 FL — SIGNIFICANT CHANGE UP (ref 80–100)
NRBC # BLD: 0 /100 WBCS — SIGNIFICANT CHANGE UP (ref 0–0)
PLATELET # BLD AUTO: 177 K/UL — SIGNIFICANT CHANGE UP (ref 150–400)
POTASSIUM SERPL-MCNC: 4.6 MMOL/L — SIGNIFICANT CHANGE UP (ref 3.5–5.3)
POTASSIUM SERPL-SCNC: 4.6 MMOL/L — SIGNIFICANT CHANGE UP (ref 3.5–5.3)
RBC # BLD: 4.57 M/UL — SIGNIFICANT CHANGE UP (ref 4.2–5.8)
RBC # FLD: 13.6 % — SIGNIFICANT CHANGE UP (ref 10.3–14.5)
SODIUM SERPL-SCNC: 137 MMOL/L — SIGNIFICANT CHANGE UP (ref 135–145)
WBC # BLD: 8.37 K/UL — SIGNIFICANT CHANGE UP (ref 3.8–10.5)
WBC # FLD AUTO: 8.37 K/UL — SIGNIFICANT CHANGE UP (ref 3.8–10.5)

## 2021-08-01 PROCEDURE — 73700 CT LOWER EXTREMITY W/O DYE: CPT | Mod: 26,LT

## 2021-08-01 PROCEDURE — 76376 3D RENDER W/INTRP POSTPROCES: CPT | Mod: 26

## 2021-08-01 RX ORDER — INSULIN LISPRO 100/ML
4 VIAL (ML) SUBCUTANEOUS ONCE
Refills: 0 | Status: COMPLETED | OUTPATIENT
Start: 2021-08-01 | End: 2021-08-01

## 2021-08-01 RX ORDER — INSULIN LISPRO 100/ML
VIAL (ML) SUBCUTANEOUS
Refills: 0 | Status: DISCONTINUED | OUTPATIENT
Start: 2021-08-01 | End: 2021-08-02

## 2021-08-01 RX ADMIN — OXYCODONE AND ACETAMINOPHEN 1 TABLET(S): 5; 325 TABLET ORAL at 08:52

## 2021-08-01 RX ADMIN — Medication 1 PATCH: at 11:54

## 2021-08-01 RX ADMIN — Medication 4: at 08:17

## 2021-08-01 RX ADMIN — ALBUTEROL 2 PUFF(S): 90 AEROSOL, METERED ORAL at 17:07

## 2021-08-01 RX ADMIN — Medication 1 PUFF(S): at 22:11

## 2021-08-01 RX ADMIN — Medication 1 PATCH: at 07:11

## 2021-08-01 RX ADMIN — Medication 1 PUFF(S): at 16:12

## 2021-08-01 RX ADMIN — Medication 1 PUFF(S): at 08:37

## 2021-08-01 RX ADMIN — OXYCODONE AND ACETAMINOPHEN 1 TABLET(S): 5; 325 TABLET ORAL at 13:27

## 2021-08-01 RX ADMIN — PANTOPRAZOLE SODIUM 40 MILLIGRAM(S): 20 TABLET, DELAYED RELEASE ORAL at 06:34

## 2021-08-01 RX ADMIN — OXYCODONE AND ACETAMINOPHEN 1 TABLET(S): 5; 325 TABLET ORAL at 22:26

## 2021-08-01 RX ADMIN — Medication 1 DROP(S): at 13:27

## 2021-08-01 RX ADMIN — SENNA PLUS 2 TABLET(S): 8.6 TABLET ORAL at 22:10

## 2021-08-01 RX ADMIN — ALBUTEROL 2 PUFF(S): 90 AEROSOL, METERED ORAL at 05:43

## 2021-08-01 RX ADMIN — Medication 1 DROP(S): at 22:11

## 2021-08-01 RX ADMIN — BUDESONIDE AND FORMOTEROL FUMARATE DIHYDRATE 2 PUFF(S): 160; 4.5 AEROSOL RESPIRATORY (INHALATION) at 22:10

## 2021-08-01 RX ADMIN — OXYCODONE AND ACETAMINOPHEN 1 TABLET(S): 5; 325 TABLET ORAL at 22:56

## 2021-08-01 RX ADMIN — Medication 1 PATCH: at 20:11

## 2021-08-01 RX ADMIN — OXYCODONE AND ACETAMINOPHEN 1 TABLET(S): 5; 325 TABLET ORAL at 07:20

## 2021-08-01 RX ADMIN — BUDESONIDE AND FORMOTEROL FUMARATE DIHYDRATE 2 PUFF(S): 160; 4.5 AEROSOL RESPIRATORY (INHALATION) at 11:53

## 2021-08-01 RX ADMIN — Medication 1 DROP(S): at 05:40

## 2021-08-01 RX ADMIN — CHLORHEXIDINE GLUCONATE 1 APPLICATION(S): 213 SOLUTION TOPICAL at 05:40

## 2021-08-01 RX ADMIN — OXYCODONE AND ACETAMINOPHEN 1 TABLET(S): 5; 325 TABLET ORAL at 14:37

## 2021-08-01 RX ADMIN — Medication 4 UNIT(S): at 12:23

## 2021-08-01 RX ADMIN — Medication 6: at 11:53

## 2021-08-01 RX ADMIN — Medication 81 MILLIGRAM(S): at 11:52

## 2021-08-01 RX ADMIN — Medication 40 MILLIGRAM(S): at 05:41

## 2021-08-01 RX ADMIN — Medication 8: at 16:25

## 2021-08-01 RX ADMIN — ALBUTEROL 2 PUFF(S): 90 AEROSOL, METERED ORAL at 11:53

## 2021-08-01 RX ADMIN — INSULIN GLARGINE 10 UNIT(S): 100 INJECTION, SOLUTION SUBCUTANEOUS at 22:09

## 2021-08-01 RX ADMIN — Medication 150 MILLIGRAM(S): at 22:10

## 2021-08-01 RX ADMIN — Medication 1 PUFF(S): at 02:47

## 2021-08-01 RX ADMIN — ENOXAPARIN SODIUM 40 MILLIGRAM(S): 100 INJECTION SUBCUTANEOUS at 11:52

## 2021-08-01 RX ADMIN — ALBUTEROL 2 PUFF(S): 90 AEROSOL, METERED ORAL at 22:11

## 2021-08-01 RX ADMIN — Medication 1 PATCH: at 11:52

## 2021-08-01 RX ADMIN — RISPERIDONE 1 MILLIGRAM(S): 4 TABLET ORAL at 11:53

## 2021-08-01 NOTE — PROGRESS NOTE ADULT - SUBJECTIVE AND OBJECTIVE BOX
Time of Visit:  Patient seen and examined.     MEDICATIONS  (STANDING):  ALBUTerol    90 MICROgram(s) HFA Inhaler 2 Puff(s) Inhalation every 6 hours  artificial  tears Solution 1 Drop(s) Both EYES three times a day  aspirin  chewable 81 milliGRAM(s) Oral daily  budesonide 160 MICROgram(s)/formoterol 4.5 MICROgram(s) Inhaler 2 Puff(s) Inhalation two times a day  cefepime   IVPB 2000 milliGRAM(s) IV Intermittent every 12 hours  chlorhexidine 2% Cloths 1 Application(s) Topical <User Schedule>  dextrose 50% Injectable 12.5 Gram(s) IV Push once  enoxaparin Injectable 40 milliGRAM(s) SubCutaneous daily  insulin glargine Injectable (LANTUS) 10 Unit(s) SubCutaneous at bedtime  insulin lispro (ADMELOG) corrective regimen sliding scale   SubCutaneous three times a day before meals  ipratropium 17 MICROgram(s) HFA Inhaler 1 Puff(s) Inhalation every 6 hours  nicotine -  14 mG/24Hr(s) Patch 1 patch Transdermal daily  pantoprazole    Tablet 40 milliGRAM(s) Oral before breakfast  polyethylene glycol 3350 17 Gram(s) Oral daily  predniSONE   Tablet 40 milliGRAM(s) Oral daily  risperiDONE   Tablet 1 milliGRAM(s) Oral daily  senna 2 Tablet(s) Oral at bedtime  traZODone 150 milliGRAM(s) Oral at bedtime      MEDICATIONS  (PRN):  oxycodone    5 mG/acetaminophen 325 mG 1 Tablet(s) Oral every 6 hours PRN Severe Pain (7 - 10)  sodium chloride 0.9% lock flush 10 milliLiter(s) IV Push every 1 hour PRN Pre/post blood products, medications, blood draw, and to maintain line patency       Medications up to date at time of exam.      PHYSICAL EXAMINATION:  Patient has no new complaints.  GENERAL: The patient is a well-developed, well-nourished, in no apparent distress.     Vital Signs Last 24 Hrs  T(C): 36.2 (01 Aug 2021 14:00), Max: 36.7 (31 Jul 2021 20:55)  T(F): 97.1 (01 Aug 2021 14:00), Max: 98 (31 Jul 2021 20:55)  HR: 97 (01 Aug 2021 14:50) (74 - 116)  BP: 95/50 (01 Aug 2021 14:50) (95/50 - 108/62)  BP(mean): --  RR: 19 (01 Aug 2021 14:00) (17 - 20)  SpO2: 95% (01 Aug 2021 14:50) (93% - 97%)   (if applicable)    Chest Tube (if applicable)    HEENT: Head is normocephalic and atraumatic. Extraocular muscles are intact. Mucous membranes are moist.     NECK: Supple, no palpable adenopathy.    LUNGS: Clear to auscultation, no wheezing, rales, or rhonchi.    HEART: Regular rate and rhythm without murmur.    ABDOMEN: Soft, nontender, and nondistended.  No hepatosplenomegaly is noted.    : No painful voiding, no pelvic pain    EXTREMITIES: Without any cyanosis, clubbing, rash, lesions or edema. Right ankle wrapped     NEUROLOGIC: Awake, alert, oriented, grossly intact    SKIN: Warm, dry, good turgor.      LABS:                        12.8   8.37  )-----------( 177      ( 01 Aug 2021 06:06 )             41.3     08-01    137  |  101  |  18  ----------------------------<  332<H>  4.6   |  34<H>  |  0.74    Ca    8.5      01 Aug 2021 06:06  Phos  3.8     07-31  Mg     2.2     07-31    TPro  5.5<L>  /  Alb  2.7<L>  /  TBili  0.7  /  DBili  x   /  AST  18  /  ALT  49  /  AlkPhos  48  07-31                        MICROBIOLOGY: (if applicable)    RADIOLOGY & ADDITIONAL STUDIES:  EKG:   CT ankle ;< from: CT Ankle No Cont, Left (08.01.21 @ 12:34) >    EXAM:  CT 3D RECONSTRUCT LAURYN LEAVITTADIA                          EXAM:  CT ANKLE ONLY LT                            PROCEDURE DATE:  08/01/2021          INTERPRETATION:  CT OF THE LEFT ANKLE    CLINICAL INFORMATION: Left ankle pain for 2 weeks. Evaluate for fracture.  TECHNIQUE: Multidetector CT of the left ankle. The study was performed without the use of intravenous or intra-articular contrast. Multiplanar reformats were generated for review. Three dimensional reconstructions were obtained on an independent workstation. The interpretation of these images is included in the body of the report of the main portion of the study.    COMPARISON: Left ankle radiographs 7/24/2021.    FINDINGS:    BONE: No acute fracture. Incidental note is made of an os trigonum posterior to the talus and os intermetatarseum on the dorsal aspect of the space between the medial cuneiform and base of the second metatarsal. No cortical destruction or periosteal new bone formation. No dislocation. Cartilage spaces are maintained. No joint effusion.    SOFT TISSUE: Vascular calcifications. Mild atrophy of the intrinsic musculature of the foot. Normal CT appearance of the imaged tendons.    IMPRESSION:  No acute fracture or dislocation.    --- End of Report ---            KEVIN ZEPEDA MD; Attending Radiologist  This document has been electronically signed. Aug  1 2021  1:06PM    < end of copied text >    ECHO:    IMPRESSION: 61y Male PAST MEDICAL & SURGICAL HISTORY:  COPD (chronic obstructive pulmonary disease)    Stented coronary artery    HLD (hyperlipidemia)    Pulmonary HTN    Type 2 diabetes mellitus without complication, with long-term current use of insulin    Coronary artery disease involving native coronary artery of native heart without angina pectoris    Bipolar 1 disorder    Smoker    Gastroesophageal reflux disease, esophagitis presence not specified    Oxygen dependent    COPD (chronic obstructive pulmonary disease)    DM (diabetes mellitus)    CAD (coronary artery disease)    GERD (gastroesophageal reflux disease)    HLD (hyperlipidemia)    Insomnia    Polyarthritis    No significant past surgical history     p/w           IMP: This is  60 yr old obese man  with HTN, HLD, DM, CHF, CAD (w stents), COPD on 2.5L home 02 and night BIPAP (12/5 35%) presents from [facility] for shortness of breath. ICU was consulted and admitted pt for Acute on chronic hypercapnic respiratory failure requiring intubation.    ICU Course: The patient arrived on 7/25 to the ICU with severe acute hypoxic respiratory 2/2 COPD exacerbation and altered mental status. He was intubated on arrival for hypoxia and protection of airway. He also became hypotensive and developed thick secretions. A central line was placed and he was started on a levo drip and a course of cefepime. He was extubated on 7/28. He received a course of steroids, and inhalation treatment with albuterol and ipratropium. Patient is intermittently compliant with BIPAP. When not on BIPAP he is on 4 L NC. Patient is now off pressors. Central line and arterial line were removed prior to transfer. Tejada was removed and a condom cath placed..  Moderate pul hypertension       Sugg  -continue BiPaP  10/ 5 with 40 % O2 at night and alternate with O2 via NC to maintain sat > 90%  -continue Symbicort and spiriva   -albuterol rescue inhaler   -continue nicotine patch 14  -CT right ankle neg for fx   -i discussed compliance with med and PAP with pat . He is known to be non compliant   -advise pat to stop smoking , he promised to stop smoking   -moderate pul hypertension probable due to COPD and obesity   -monitor FS with coverage   -advise wt loss  -dvt/gi prophy  -out pat pulmonary f/u

## 2021-08-01 NOTE — DISCHARGE NOTE PROVIDER - NSDCCPCAREPLAN_GEN_ALL_CORE_FT
PRINCIPAL DISCHARGE DIAGNOSIS  Diagnosis: Acute on chronic respiratory failure with hypoxia and hypercapnia  Assessment and Plan of Treatment: Acute on chronic respiratory failure with hypoxia and hypercapnia  Continue using oxygen during the day. You must use your Trilogy at night every night for at least 6 hours a night. Continue using your inhalers as prescribed. Rinse your mouth after using your inhalers.      SECONDARY DISCHARGE DIAGNOSES  Diagnosis: COPD (chronic obstructive pulmonary disease)  Assessment and Plan of Treatment: Continue using oxygen as prescribed. Use your Trilogy as prescribed nightly. You are a high risk for readmission if you do not use your Trilogy as prescibed. Continue using your inhalers as prescribed.    Diagnosis: CAD (coronary artery disease)  Assessment and Plan of Treatment: CAD (coronary artery disease)  Continue taking your medications as prescribed. Limit the amount of salt in your diet.    Diagnosis: DM (diabetes mellitus)  Assessment and Plan of Treatment: DM (diabetes mellitus)  Continue taking your medications as prescribed. Monitor your glucose. Limit the amount of carbohydrates and sugars you consume.    Diagnosis: Nicotine addiction  Assessment and Plan of Treatment: Nicotine addiction  You need to stop smoking. Continue using nicotine patch as prescribed.

## 2021-08-01 NOTE — DISCHARGE NOTE PROVIDER - HOSPITAL COURSE
59 yo M with PMH including HTN, HLD, DM, CHF, CAD (w stents), COPD on 2.5L home 02 and night BIPAP (12/5 35%) presents from [facility] for shortness of breath. ICU was consulted and admitted pt for Acute on chronic hypercapnic respiratory failure requiring intubation. The patient arrived on 7/25 to the ICU with severe acute hypoxic respiratory 2/2 COPD exacerbation and altered mental status. He was intubated on arrival for hypoxia and protection of airway. He also became hypotensive and developed thick secretions. A central line was placed and he was started on a levo drip and a course of cefepime. He was extubated on 7/28. He received a course of steroids, and inhalation treatment with albuterol and ipratropium. Patient is intermittently compliant with BIPAP. When not on BIPAP he is on 4 L NC. Patient is now off pressors. Central line and arterial line were removed prior to transfer. Tejada was removed and a condom cath placed. Downgraded to medicine 7/30. Patient on prednisone and added Daliresp   61 yo M with PMH including HTN, HLD, DM, CHF, CAD (w stents), COPD on 2.5L home 02 and night BIPAP (12/5 35%) presents from [facility] for shortness of breath. ICU was consulted and admitted pt for Acute on chronic hypercapnic respiratory failure requiring intubation. The patient arrived on 7/25 to the ICU with severe acute hypoxic respiratory 2/2 COPD exacerbation and altered mental status. He was intubated on arrival for hypoxia and protection of airway. He also became hypotensive and developed thick secretions. A central line was placed and he was started on a levo drip and a course of cefepime. He was extubated on 7/28. He received a course of steroids, and inhalation treatment with albuterol and ipratropium. Patient is intermittently compliant with BIPAP. When not on BIPAP he is on 4 L NC. Patient is now off pressors. Central line and arterial line were removed prior to transfer. Tejada was removed and a condom cath placed. Downgraded to medicine 7/30. Patient on prednisone and added Daliresp  < from: Xray Chest 1 View- PORTABLE-Routine (Xray Chest 1 View- PORTABLE-Routine in AM.) (07.28.21 @ 06:53) >    FINDINGS: ET tube tip above tracheal bifurcation.  NG tube tip beyond GE junction.  RIGHT IJ catheter tip in SVC.      The lungs show asymmetric haziness overlying the LEFT hemithorax which may indicate pleural effusion layering along the LEFT posterior thoracic wall. Remaining lung parenchyma clear.  . No pneumothorax.    The heart and mediastinum size and configuration are within normal limits.    < end of copied text >       60 yo M with PMH including HTN, HLD, DM, CHF, CAD (w stents), COPD on 2.5L home 02 and night BIPAP (12/5 35%) presents from [facility] for shortness of breath. ICU was consulted and admitted pt for Acute on chronic hypercapnic respiratory failure requiring intubation. The patient arrived on 7/25 to the ICU with severe acute hypoxic respiratory 2/2 COPD exacerbation and altered mental status. He was intubated on arrival for hypoxia and protection of airway. He also became hypotensive and developed thick secretions. A central line was placed and he was started on a levo drip and a course of cefepime. He was extubated on 7/28. He received a course of steroids, and inhalation treatment with albuterol and ipratropium. Patient is intermittently compliant with BIPAP. When not on BIPAP he is on 4 L NC. Patient is now off pressors. Central line and arterial line were removed prior to transfer. Tejada was removed and a condom cath placed. Downgraded to medicine 7/30. Patient on prednisone and added Daliresp  < from: Xray Chest 1 View- PORTABLE-Routine (Xray Chest 1 View- PORTABLE-Routine in AM.) (07.28.21 @ 06:53) >    FINDINGS: ET tube tip above tracheal bifurcation.  NG tube tip beyond GE junction.  RIGHT IJ catheter tip in SVC.      The lungs show asymmetric haziness overlying the LEFT hemithorax which may indicate pleural effusion layering along the LEFT posterior thoracic wall. Remaining lung parenchyma clear.  . No pneumothorax.    The heart and mediastinum size and configuration are within normal limits.    < end of copied text >

## 2021-08-01 NOTE — CHART NOTE - NSCHARTNOTEFT_GEN_A_CORE
< from: CT Ankle No Cont, Left (08.01.21 @ 12:34) >      EXAM:  CT 3D RECONSTRUCT LAURYN TSAI                          EXAM:  CT ANKLE ONLY LT                            PROCEDURE DATE:  08/01/2021          INTERPRETATION:  CT OF THE LEFT ANKLE    CLINICAL INFORMATION: Left ankle pain for 2 weeks. Evaluate for fracture.  TECHNIQUE: Multidetector CT of the left ankle. The study was performed without the use of intravenous or intra-articular contrast. Multiplanar reformats were generated for review. Three dimensional reconstructions were obtained on an independent workstation. The interpretation of these images is included in the body of the report of the main portion of the study.    COMPARISON: Left ankle radiographs 7/24/2021.    FINDINGS:    BONE: No acute fracture. Incidental note is made of an os trigonum posterior to the talus and os intermetatarseum on the dorsal aspect of the space between the medial cuneiform and base of the second metatarsal. No cortical destruction or periosteal new bone formation. No dislocation. Cartilage spaces are maintained. No joint effusion.    SOFT TISSUE: Vascular calcifications. Mild atrophy of the intrinsic musculature of the foot. Normal CT appearance of the imaged tendons.    IMPRESSION:  No acute fracture or dislocation.    --- End of Report ---  CT report reviewed   pt weightbearing status to left lower extremities -as tolerated

## 2021-08-01 NOTE — PROGRESS NOTE ADULT - SUBJECTIVE AND OBJECTIVE BOX
Patient is a 61y old  Male who presents with a chief complaint of AHHRF (31 Jul 2021 17:59)    PATIENT IS SEEN AND EXAMINED IN MEDICAL FLOOR.    NGT [    ]    MT [   ]      GT [   ]    ALLERGIES:  No Known Allergies      Daily     Daily     VITALS:    Vital Signs Last 24 Hrs  T(C): 36.1 (01 Aug 2021 05:31), Max: 36.7 (31 Jul 2021 20:55)  T(F): 97 (01 Aug 2021 05:31), Max: 98 (31 Jul 2021 20:55)  HR: 74 (01 Aug 2021 08:24) (74 - 110)  BP: 99/53 (01 Aug 2021 05:31) (99/53 - 115/74)  BP(mean): --  RR: 17 (01 Aug 2021 08:24) (17 - 20)  SpO2: 93% (01 Aug 2021 08:24) (93% - 97%)    LABS:    CBC Full  -  ( 01 Aug 2021 06:06 )  WBC Count : 8.37 K/uL  RBC Count : 4.57 M/uL  Hemoglobin : 12.8 g/dL  Hematocrit : 41.3 %  Platelet Count - Automated : 177 K/uL  Mean Cell Volume : 90.4 fl  Mean Cell Hemoglobin : 28.0 pg  Mean Cell Hemoglobin Concentration : 31.0 gm/dL  Auto Neutrophil # : x  Auto Lymphocyte # : x  Auto Monocyte # : x  Auto Eosinophil # : x  Auto Basophil # : x  Auto Neutrophil % : x  Auto Lymphocyte % : x  Auto Monocyte % : x  Auto Eosinophil % : x  Auto Basophil % : x      08-01    137  |  101  |  18  ----------------------------<  332<H>  4.6   |  34<H>  |  0.74    Ca    8.5      01 Aug 2021 06:06  Phos  3.8     07-31  Mg     2.2     07-31    TPro  5.5<L>  /  Alb  2.7<L>  /  TBili  0.7  /  DBili  x   /  AST  18  /  ALT  49  /  AlkPhos  48  07-31    CAPILLARY BLOOD GLUCOSE      POCT Blood Glucose.: 331 mg/dL (01 Aug 2021 08:08)  POCT Blood Glucose.: 268 mg/dL (31 Jul 2021 21:20)  POCT Blood Glucose.: 285 mg/dL (31 Jul 2021 16:44)  POCT Blood Glucose.: 365 mg/dL (31 Jul 2021 11:44)        LIVER FUNCTIONS - ( 31 Jul 2021 06:52 )  Alb: 2.7 g/dL / Pro: 5.5 g/dL / ALK PHOS: 48 U/L / ALT: 49 U/L DA / AST: 18 U/L / GGT: x           Creatinine Trend: 0.74<--, 0.34<--, 0.41<--, 0.44<--, 0.51<--, 0.52<--  I&O's Summary              MEDICATIONS:    MEDICATIONS  (STANDING):  ALBUTerol    90 MICROgram(s) HFA Inhaler 2 Puff(s) Inhalation every 6 hours  artificial  tears Solution 1 Drop(s) Both EYES three times a day  aspirin  chewable 81 milliGRAM(s) Oral daily  budesonide 160 MICROgram(s)/formoterol 4.5 MICROgram(s) Inhaler 2 Puff(s) Inhalation two times a day  cefepime   IVPB 2000 milliGRAM(s) IV Intermittent every 12 hours  chlorhexidine 2% Cloths 1 Application(s) Topical <User Schedule>  dextrose 50% Injectable 12.5 Gram(s) IV Push once  enoxaparin Injectable 40 milliGRAM(s) SubCutaneous daily  insulin glargine Injectable (LANTUS) 10 Unit(s) SubCutaneous at bedtime  insulin lispro (ADMELOG) corrective regimen sliding scale   SubCutaneous Before meals and at bedtime  ipratropium 17 MICROgram(s) HFA Inhaler 1 Puff(s) Inhalation every 6 hours  nicotine -  14 mG/24Hr(s) Patch 1 patch Transdermal daily  pantoprazole    Tablet 40 milliGRAM(s) Oral before breakfast  polyethylene glycol 3350 17 Gram(s) Oral daily  predniSONE   Tablet 40 milliGRAM(s) Oral daily  risperiDONE   Tablet 1 milliGRAM(s) Oral daily  senna 2 Tablet(s) Oral at bedtime  traZODone 150 milliGRAM(s) Oral at bedtime      MEDICATIONS  (PRN):  oxycodone    5 mG/acetaminophen 325 mG 1 Tablet(s) Oral every 6 hours PRN Severe Pain (7 - 10)  sodium chloride 0.9% lock flush 10 milliLiter(s) IV Push every 1 hour PRN Pre/post blood products, medications, blood draw, and to maintain line patency        REVIEW OF SYSTEMS:                           ALL ROS DONE [ X   ]      CONSTITUTIONAL:  LETHARGIC [   ], FEVER [   ], UNRESPONSIVE [   ]  CVS:  CP  [   ], SOB, [   ], PALPITATIONS [   ], DIZZYNESS [   ]  RS: COUGH [   ], SPUTUM [   ]  GI: ABDOMINAL PAIN [   ], NAUSEA [   ], VOMITINGS [   ], DIARRHEA [   ], CONSTIPATION [   ]  :  DYSURIA [   ], NOCTURIA [   ], INCREASED FREQUENCY [   ], DRIBLING [   ],  SKELETAL: PAINFUL JOINTS [   ], SWOLLEN JOINTS [   ], NECK ACHE [   ], LOW BACK ACHE [   ],  SKIN : ULCERS [   ], RASH [   ], ITCHING [   ]  CNS: HEAD ACHE [   ], DOUBLE VISION [   ], BLURRED VISION [   ], AMS / CONFUSION [   ], SEIZURES [   ], WEAKNESS [   ],TINGLING / NUMBNESS [   ]      PHYSICAL EXAMINATION:  GENERAL APPEARANCE: NO DISTRESS  HEENT:  NO PALLOR, NO  JVD,  NO   NODES, NECK SUPPLE  CVS: S1 +, S2 +,   RS: AEEB,  OCCASIONAL  RALES +,   RHONCHI, ON VENTIMASK  ABD: SOFT, NT, NO, BS +    NGTUBE+  EXT: NO PE  SKIN: WARM,   CVC+  SKELETAL:  ROM ACCEPTABLE, LEFT ANKLE   CNS:  AAO X 2-3, NO  DEFICITS    RADIOLOGY :    EXAM:  XR CHEST PORTABLE URGENT 1V                            PROCEDURE DATE:  07/25/2021          INTERPRETATION:  History: Shortness of breath. Difficulty breathing.    FINDINGS: Frontal chest.    COMPARISON: 7/24/2021.    Exam is limited due to collimation at the lung bases.    Heart size may be exaggerated by AP technique. Mild bronchovascular crowding at the bases. There is no focal lung consolidation or sizable pleural effusion. No significant osseous abnormality. Degenerative change of the spine.    IMPRESSION:    Unremarkable frontal chest.    ==================================================    EXAM:  XR ANKLE COMP MIN 3 VIEWS LT                            PROCEDURE DATE:  07/24/2021          INTERPRETATION:  History: Fall, ankle pain.    FINDINGS: Frontal, lateral and oblique projections of the left ankle.    Diffuse soft tissue swelling. Osteopenia. No fracture or dislocation. Ankle mortise is aligned. Extensive vascular calcification.    IMPRESSION:    No fracture or dislocation left ankle appreciated.      ASSESSMENT :       PLAN:  HPI:  59 yo M with PMH including HTN, HLD, DM, CHF, CAD (w stents), COPD on 2.5L home 02 and night BIPAP (12/5 35%) presents from [facility] for shortness of breath. When EMS arrived pt was noted to be in respiratory distress and his portable O2 machine was uncharged and not running. Pt endorsed to ED physician his SOB has worsened overnight after being discharged 1 day PTA. Off note Pt was here last night for left ankle pain, He was diagnosed with non-displaced left lateral malleolus fracture. Pt was noted to be tachycardic and sob but refused admission and discharged with cast. Pt was Ax0 x3 in ED but Upon my examination pt was lethargic and unable to answer any questions.  (25 Jul 2021 09:56)    # ACUTE ON CHRONIC HYPOXIC HYPERCAPNEIC RESPIRATORY FAILURE, EMPHYSEMA - S/P INTUBATION, ON PREDNISONE [TAPERED FROM SOLUMEDROL], ON DUONEB, CEFEPIME  ( COUNSELLED PATIENT TO QUIT SMOKING - ACTIVE SMOKER )   - S/P EXTUBATION AND IS ON NC - TOLERATING WELL   - COUNSELLED ON BIPAP COMPLIANCE  # MORBIDLY OBESE WITH RESTRICTIVE LUNG DISEASE, SUSPECT OBSTRUCTIVE SLEEP APNOEA , COPD ON HOME O2 NC    # NONDISPLACED LEFT LATERAL MALLEOLUS FRACTURE - PODIATRY CONSULT - OBTAIN CT OF LEFT ANKLE ,  OBTAIN PT AND OT EVALUATION   #  CHRONIC LOW BACK ACHE AND PANIC DISORDER - START PERCOCET -  HOME DOSE  ,  (  HOLD XANAX FOR NOW )      # AMS , ACUTE METABOLIC ENCEPHALOPATHY - SUSPECT S/T HYPERCAPNOEA - RESOLVING     # HTN, DM TYPE 2, HLD, CAD, S/P STENTS, CHRONIC SYSTOLIC CHF   # COPD, EMPHYSEMA, TOBACCO ABUSE    #  GI AND DVT PROPHYLAXIS    DR. REFUGIO CABELLO Patient is a 61y old  Male who presents with a chief complaint of AHHRF (31 Jul 2021 17:59)    PATIENT IS SEEN AND EXAMINED IN MEDICAL FLOOR.    NGT [    ]    MT [   ]      GT [   ]    ALLERGIES:  No Known Allergies      Daily     Daily     VITALS:    Vital Signs Last 24 Hrs  T(C): 36.1 (01 Aug 2021 05:31), Max: 36.7 (31 Jul 2021 20:55)  T(F): 97 (01 Aug 2021 05:31), Max: 98 (31 Jul 2021 20:55)  HR: 74 (01 Aug 2021 08:24) (74 - 110)  BP: 99/53 (01 Aug 2021 05:31) (99/53 - 115/74)  BP(mean): --  RR: 17 (01 Aug 2021 08:24) (17 - 20)  SpO2: 93% (01 Aug 2021 08:24) (93% - 97%)    LABS:    CBC Full  -  ( 01 Aug 2021 06:06 )  WBC Count : 8.37 K/uL  RBC Count : 4.57 M/uL  Hemoglobin : 12.8 g/dL  Hematocrit : 41.3 %  Platelet Count - Automated : 177 K/uL  Mean Cell Volume : 90.4 fl  Mean Cell Hemoglobin : 28.0 pg  Mean Cell Hemoglobin Concentration : 31.0 gm/dL  Auto Neutrophil # : x  Auto Lymphocyte # : x  Auto Monocyte # : x  Auto Eosinophil # : x  Auto Basophil # : x  Auto Neutrophil % : x  Auto Lymphocyte % : x  Auto Monocyte % : x  Auto Eosinophil % : x  Auto Basophil % : x      08-01    137  |  101  |  18  ----------------------------<  332<H>  4.6   |  34<H>  |  0.74    Ca    8.5      01 Aug 2021 06:06  Phos  3.8     07-31  Mg     2.2     07-31    TPro  5.5<L>  /  Alb  2.7<L>  /  TBili  0.7  /  DBili  x   /  AST  18  /  ALT  49  /  AlkPhos  48  07-31    CAPILLARY BLOOD GLUCOSE      POCT Blood Glucose.: 331 mg/dL (01 Aug 2021 08:08)  POCT Blood Glucose.: 268 mg/dL (31 Jul 2021 21:20)  POCT Blood Glucose.: 285 mg/dL (31 Jul 2021 16:44)  POCT Blood Glucose.: 365 mg/dL (31 Jul 2021 11:44)        LIVER FUNCTIONS - ( 31 Jul 2021 06:52 )  Alb: 2.7 g/dL / Pro: 5.5 g/dL / ALK PHOS: 48 U/L / ALT: 49 U/L DA / AST: 18 U/L / GGT: x           Creatinine Trend: 0.74<--, 0.34<--, 0.41<--, 0.44<--, 0.51<--, 0.52<--  I&O's Summary              MEDICATIONS:    MEDICATIONS  (STANDING):  ALBUTerol    90 MICROgram(s) HFA Inhaler 2 Puff(s) Inhalation every 6 hours  artificial  tears Solution 1 Drop(s) Both EYES three times a day  aspirin  chewable 81 milliGRAM(s) Oral daily  budesonide 160 MICROgram(s)/formoterol 4.5 MICROgram(s) Inhaler 2 Puff(s) Inhalation two times a day  cefepime   IVPB 2000 milliGRAM(s) IV Intermittent every 12 hours  chlorhexidine 2% Cloths 1 Application(s) Topical <User Schedule>  dextrose 50% Injectable 12.5 Gram(s) IV Push once  enoxaparin Injectable 40 milliGRAM(s) SubCutaneous daily  insulin glargine Injectable (LANTUS) 10 Unit(s) SubCutaneous at bedtime  insulin lispro (ADMELOG) corrective regimen sliding scale   SubCutaneous Before meals and at bedtime  ipratropium 17 MICROgram(s) HFA Inhaler 1 Puff(s) Inhalation every 6 hours  nicotine -  14 mG/24Hr(s) Patch 1 patch Transdermal daily  pantoprazole    Tablet 40 milliGRAM(s) Oral before breakfast  polyethylene glycol 3350 17 Gram(s) Oral daily  predniSONE   Tablet 40 milliGRAM(s) Oral daily  risperiDONE   Tablet 1 milliGRAM(s) Oral daily  senna 2 Tablet(s) Oral at bedtime  traZODone 150 milliGRAM(s) Oral at bedtime      MEDICATIONS  (PRN):  oxycodone    5 mG/acetaminophen 325 mG 1 Tablet(s) Oral every 6 hours PRN Severe Pain (7 - 10)  sodium chloride 0.9% lock flush 10 milliLiter(s) IV Push every 1 hour PRN Pre/post blood products, medications, blood draw, and to maintain line patency        REVIEW OF SYSTEMS:                           ALL ROS DONE [ X   ]      CONSTITUTIONAL:  LETHARGIC [   ], FEVER [   ], UNRESPONSIVE [   ]  CVS:  CP  [   ], SOB, [   ], PALPITATIONS [   ], DIZZYNESS [   ]  RS: COUGH [   ], SPUTUM [   ]  GI: ABDOMINAL PAIN [   ], NAUSEA [   ], VOMITINGS [   ], DIARRHEA [   ], CONSTIPATION [   ]  :  DYSURIA [   ], NOCTURIA [   ], INCREASED FREQUENCY [   ], DRIBLING [   ],  SKELETAL: PAINFUL JOINTS [   ], SWOLLEN JOINTS [   ], NECK ACHE [   ], LOW BACK ACHE [   ],  SKIN : ULCERS [   ], RASH [   ], ITCHING [   ]  CNS: HEAD ACHE [   ], DOUBLE VISION [   ], BLURRED VISION [   ], AMS / CONFUSION [   ], SEIZURES [   ], WEAKNESS [   ],TINGLING / NUMBNESS [   ]      PHYSICAL EXAMINATION:  GENERAL APPEARANCE: NO DISTRESS  HEENT:  NO PALLOR, NO  JVD,  NO   NODES, NECK SUPPLE  CVS: S1 +, S2 +,   RS: AEEB,  OCCASIONAL  RALES +,   RHONCHI, ON VENTIMASK  ABD: SOFT, NT, NO, BS +    NGTUBE+  EXT: NO PE  SKIN: WARM,   CVC+  SKELETAL:  ROM ACCEPTABLE, LEFT ANKLE   CNS:  AAO X 2-3, NO  DEFICITS    RADIOLOGY :    EXAM:  XR CHEST PORTABLE URGENT 1V                            PROCEDURE DATE:  07/25/2021          INTERPRETATION:  History: Shortness of breath. Difficulty breathing.    FINDINGS: Frontal chest.    COMPARISON: 7/24/2021.    Exam is limited due to collimation at the lung bases.    Heart size may be exaggerated by AP technique. Mild bronchovascular crowding at the bases. There is no focal lung consolidation or sizable pleural effusion. No significant osseous abnormality. Degenerative change of the spine.    IMPRESSION:    Unremarkable frontal chest.    ==================================================    EXAM:  XR ANKLE COMP MIN 3 VIEWS LT                            PROCEDURE DATE:  07/24/2021          INTERPRETATION:  History: Fall, ankle pain.    FINDINGS: Frontal, lateral and oblique projections of the left ankle.    Diffuse soft tissue swelling. Osteopenia. No fracture or dislocation. Ankle mortise is aligned. Extensive vascular calcification.    IMPRESSION:    No fracture or dislocation left ankle appreciated.      ASSESSMENT :       PLAN:  HPI:  61 yo M with PMH including HTN, HLD, DM, CHF, CAD (w stents), COPD on 2.5L home 02 and night BIPAP (12/5 35%) presents from [facility] for shortness of breath. When EMS arrived pt was noted to be in respiratory distress and his portable O2 machine was uncharged and not running. Pt endorsed to ED physician his SOB has worsened overnight after being discharged 1 day PTA. Off note Pt was here last night for left ankle pain, He was diagnosed with non-displaced left lateral malleolus fracture. Pt was noted to be tachycardic and sob but refused admission and discharged with cast. Pt was Ax0 x3 in ED but Upon my examination pt was lethargic and unable to answer any questions.  (25 Jul 2021 09:56)      # DC PLAN IN AM - OBTAIN  PT AND OT EVALUATION     # ACUTE ON CHRONIC HYPOXIC HYPERCAPNEIC RESPIRATORY FAILURE, EMPHYSEMA - S/P INTUBATION, ON PREDNISONE [TAPERED FROM SOLUMEDROL], ON DUONEB, CEFEPIME  ( COUNSELLED PATIENT TO QUIT SMOKING - ACTIVE SMOKER )   - S/P EXTUBATION AND IS ON NC - TOLERATING WELL   - COUNSELLED ON BIPAP COMPLIANCE  # MORBIDLY OBESE WITH RESTRICTIVE LUNG DISEASE, SUSPECT OBSTRUCTIVE SLEEP APNOEA , COPD ON HOME O2 NC    # NONDISPLACED LEFT LATERAL MALLEOLUS FRACTURE - PODIATRY CONSULT - OBTAIN CT OF LEFT ANKLE ,  OBTAIN PT AND OT EVALUATION   #  CHRONIC LOW BACK ACHE AND PANIC DISORDER - START PERCOCET -  HOME DOSE  ,  (  HOLD XANAX FOR NOW )      # AMS , ACUTE METABOLIC ENCEPHALOPATHY - SUSPECT S/T HYPERCAPNOEA - RESOLVING     # HTN, DM TYPE 2, HLD, CAD, S/P STENTS, CHRONIC SYSTOLIC CHF   # COPD, EMPHYSEMA, TOBACCO ABUSE    #  GI AND DVT PROPHYLAXIS    DR. REFUGIO CABELLO

## 2021-08-01 NOTE — DISCHARGE NOTE PROVIDER - NSDCMRMEDTOKEN_GEN_ALL_CORE_FT
ALPRAZolam 0.5 mg oral tablet: 1 tab(s) orally 2 times a day MDD:2  Artificial Tears ophthalmic solution: 1 dose(s) to each affected eye once a day  aspirin 81 mg oral tablet: 1 tab(s) orally once a day  DuoNeb 0.5 mg-2.5 mg/3 mL inhalation solution: 1 scoop(s) inhaled  Eucerin topical lotion: Apply topically to affected area once a day  furosemide 20 mg oral tablet: 1 tab(s) orally once a day  lidocaine 5% topical film: Apply topically to affected area once a day MDD:1  Melatonin 3 mg oral tablet: 1 tab(s) orally once a day (at bedtime)  MiraLax oral powder for reconstitution:   multivitamin with minerals:   nicotine 7 mg/24 hr transdermal film, extended release: 1 patch transdermal once a day   NovoLIN 70/30 subcutaneous suspension: 1  subcutaneous 2 times a day  pantoprazole 40 mg oral delayed release tablet: 1 tab(s) orally once a day  Percocet 10/325 oral tablet: 1 tab(s) orally every 6 hours  predniSONE 10 mg oral tablet: 1 tab(s) orally once a day  predniSONE 20 mg oral tablet: 1 tab(s) orally once a day  ProAir Digihaler 90 mcg/inh inhalation powder: 2 puff(s) inhaled every 6 hours  RisperDAL 1 mg oral tablet: 1 tab(s) orally once a day  simethicone 80 mg oral tablet: 1 tab(s) orally 4 times a day (after meals and at bedtime)  Symbicort 160 mcg-4.5 mcg/inh inhalation aerosol: 2 puff(s) inhaled 2 times a day  tiotropium 18 mcg inhalation capsule: 1 cap(s) inhaled once a day  traZODone 150 mg oral tablet: 1 tab(s) orally once a day (at bedtime)  Trilogy: 99 months  Tidal volume 400  Max pressure 23  Min pressure 8  Back up rate 12  Blend in 2LPM oxygen  J44.9  To be used nightly and as needed during the day   ALPRAZolam 0.5 mg oral tablet: 1 tab(s) orally 2 times a day MDD:2  Artificial Tears ophthalmic solution: 1 dose(s) to each affected eye once a day  aspirin 81 mg oral tablet: 1 tab(s) orally once a day  DuoNeb 0.5 mg-2.5 mg/3 mL inhalation solution: 1 scoop(s) inhaled  Eucerin topical lotion: Apply topically to affected area once a day  furosemide 20 mg oral tablet: 1 tab(s) orally once a day  lidocaine 5% topical film: Apply topically to affected area once a day MDD:1  Melatonin 3 mg oral tablet: 1 tab(s) orally once a day (at bedtime)  MiraLax oral powder for reconstitution:   multivitamin with minerals:   nicotine 7 mg/24 hr transdermal film, extended release: 1 patch transdermal once a day   NovoLIN 70/30 subcutaneous suspension: 1  subcutaneous 2 times a day  pantoprazole 40 mg oral delayed release tablet: 1 tab(s) orally once a day  Percocet 10/325 oral tablet: 1 tab(s) orally every 6 hours  predniSONE 10 mg oral tablet: 2 tab(s) orally once a day x 3 days  1 tab(s) orally once a day x 3 days  ProAir Digihaler 90 mcg/inh inhalation powder: 2 puff(s) inhaled every 6 hours  RisperDAL 1 mg oral tablet: 1 tab(s) orally once a day  senna oral tablet: 2 tab(s) orally once a day (at bedtime)  simethicone 80 mg oral tablet: 1 tab(s) orally 4 times a day (after meals and at bedtime)  Symbicort 160 mcg-4.5 mcg/inh inhalation aerosol: 2 puff(s) inhaled 2 times a day  tiotropium 18 mcg inhalation capsule: 1 cap(s) inhaled once a day  traZODone 150 mg oral tablet: 1 tab(s) orally once a day (at bedtime)  Trilogy: 99 months  Tidal volume 400  Max pressure 23  Min pressure 8  Back up rate 12  Blend in 2LPM oxygen  J44.9  To be used nightly and as needed during the day   ALPRAZolam 0.5 mg oral tablet: 1 tab(s) orally 2 times a day MDD:2  Artificial Tears ophthalmic solution: 1 dose(s) to each affected eye once a day  aspirin 81 mg oral tablet: 1 tab(s) orally once a day  DuoNeb 0.5 mg-2.5 mg/3 mL inhalation solution: 1 scoop(s) inhaled  Eucerin topical lotion: Apply topically to affected area once a day  furosemide 20 mg oral tablet: 1 tab(s) orally once a day  Melatonin 3 mg oral tablet: 1 tab(s) orally once a day (at bedtime)  MiraLax oral powder for reconstitution:   multivitamin with minerals:   nicotine 21 mg/24 hr transdermal film, extended release: 1 patch transdermal once a day  NovoLIN 70/30 subcutaneous suspension: 1  subcutaneous 2 times a day  oxycodone-acetaminophen 10 mg-325 mg oral tablet: 1 tab(s) orally every 6 hours, As Needed -for severe pain MDD:4  pantoprazole 40 mg oral delayed release tablet: 1 tab(s) orally once a day  predniSONE 10 mg oral tablet: 3 tab(s) orally once a day x 2 days  2 tab(s) orally once a day x 2 days  1 tab(s) orally once a day x 2 days  RisperDAL 1 mg oral tablet: 1 tab(s) orally once a day  senna oral tablet: 2 tab(s) orally once a day (at bedtime)  simethicone 80 mg oral tablet: 1 tab(s) orally 4 times a day (after meals and at bedtime)  Symbicort 160 mcg-4.5 mcg/inh inhalation aerosol: 2 puff(s) inhaled 2 times a day  tiotropium 18 mcg inhalation capsule: 1 cap(s) inhaled once a day  traZODone 150 mg oral tablet: 1 tab(s) orally once a day (at bedtime)   ALPRAZolam 0.5 mg oral tablet: 1 tab(s) orally 2 times a day MDD:2  Artificial Tears ophthalmic solution: 1 dose(s) to each affected eye once a day  aspirin 81 mg oral tablet: 1 tab(s) orally once a day  cefuroxime 500 mg oral tablet: 1 tab(s) orally 2 times a day   DuoNeb 0.5 mg-2.5 mg/3 mL inhalation solution: 1 scoop(s) inhaled  furosemide 20 mg oral tablet: 1 tab(s) orally once a day  gabapentin 100 mg oral capsule: 1 cap(s) orally every 12 hours  guaiFENesin 600 mg oral tablet, extended release: 1 tab(s) orally every 12 hours  insulin glargine: 20 unit(s) subcutaneous once a day (at bedtime)  lidocaine 4% topical film:  topically   Melatonin 3 mg oral tablet: 1 tab(s) orally once a day (at bedtime)  MiraLax oral powder for reconstitution:   multivitamin with minerals:   nicotine 21 mg/24 hr transdermal film, extended release: 1 patch transdermal once a day  oxycodone-acetaminophen 10 mg-325 mg oral tablet: 1 tab(s) orally every 6 hours, As Needed -for severe pain MDD:4  pantoprazole 40 mg oral delayed release tablet: 1 tab(s) orally once a day  predniSONE 10 mg oral tablet: 3 tab(s) orally once a day MDD:30mg  predniSONE 10 mg oral tablet: 1 tab(s) orally once a day MDD:10mg  predniSONE 20 mg oral tablet: 1 tab(s) orally once a day MDD:20mg  predniSONE 20 mg oral tablet: 2 tab(s) orally once a day MDD:40mg  senna oral tablet: 2 tab(s) orally once a day (at bedtime)  simethicone 80 mg oral tablet: 1 tab(s) orally 4 times a day (after meals and at bedtime)  Symbicort 160 mcg-4.5 mcg/inh inhalation aerosol: 2 puff(s) inhaled 2 times a day  traZODone 150 mg oral tablet: 1 tab(s) orally once a day (at bedtime)

## 2021-08-01 NOTE — DISCHARGE NOTE PROVIDER - CARE PROVIDER_API CALL
Raman Ramos)  Medicine  [id number] [place] [address]  Phone: [phone]  Fax: [phone]  Follow Up Time: 1 week

## 2021-08-01 NOTE — DISCHARGE NOTE PROVIDER - DETAILS OF MALNUTRITION DIAGNOSIS/DIAGNOSES
This patient has been assessed with a concern for Malnutrition and was treated during this hospitalization for the following Nutrition diagnosis/diagnoses:     -  07/28/2021: Moderate protein-calorie malnutrition

## 2021-08-02 ENCOUNTER — TRANSCRIPTION ENCOUNTER (OUTPATIENT)
Age: 61
End: 2021-08-02

## 2021-08-02 VITALS
HEART RATE: 111 BPM | OXYGEN SATURATION: 97 % | RESPIRATION RATE: 20 BRPM | SYSTOLIC BLOOD PRESSURE: 119 MMHG | DIASTOLIC BLOOD PRESSURE: 60 MMHG | TEMPERATURE: 98 F

## 2021-08-02 LAB
ANION GAP SERPL CALC-SCNC: 2 MMOL/L — LOW (ref 5–17)
BASOPHILS # BLD AUTO: 0.01 K/UL — SIGNIFICANT CHANGE UP (ref 0–0.2)
BASOPHILS NFR BLD AUTO: 0.1 % — SIGNIFICANT CHANGE UP (ref 0–2)
BUN SERPL-MCNC: 22 MG/DL — HIGH (ref 7–18)
CALCIUM SERPL-MCNC: 8.8 MG/DL — SIGNIFICANT CHANGE UP (ref 8.4–10.5)
CHLORIDE SERPL-SCNC: 98 MMOL/L — SIGNIFICANT CHANGE UP (ref 96–108)
CO2 SERPL-SCNC: 30 MMOL/L — SIGNIFICANT CHANGE UP (ref 22–31)
CREAT SERPL-MCNC: 0.72 MG/DL — SIGNIFICANT CHANGE UP (ref 0.5–1.3)
EOSINOPHIL # BLD AUTO: 0.02 K/UL — SIGNIFICANT CHANGE UP (ref 0–0.5)
EOSINOPHIL NFR BLD AUTO: 0.2 % — SIGNIFICANT CHANGE UP (ref 0–6)
GLUCOSE BLDC GLUCOMTR-MCNC: 301 MG/DL — HIGH (ref 70–99)
GLUCOSE BLDC GLUCOMTR-MCNC: 338 MG/DL — HIGH (ref 70–99)
GLUCOSE SERPL-MCNC: 400 MG/DL — HIGH (ref 70–99)
HCT VFR BLD CALC: 43.8 % — SIGNIFICANT CHANGE UP (ref 39–50)
HGB BLD-MCNC: 13.7 G/DL — SIGNIFICANT CHANGE UP (ref 13–17)
IMM GRANULOCYTES NFR BLD AUTO: 0.8 % — SIGNIFICANT CHANGE UP (ref 0–1.5)
LYMPHOCYTES # BLD AUTO: 0.74 K/UL — LOW (ref 1–3.3)
LYMPHOCYTES # BLD AUTO: 5.8 % — LOW (ref 13–44)
MCHC RBC-ENTMCNC: 28 PG — SIGNIFICANT CHANGE UP (ref 27–34)
MCHC RBC-ENTMCNC: 31.3 GM/DL — LOW (ref 32–36)
MCV RBC AUTO: 89.6 FL — SIGNIFICANT CHANGE UP (ref 80–100)
MONOCYTES # BLD AUTO: 0.37 K/UL — SIGNIFICANT CHANGE UP (ref 0–0.9)
MONOCYTES NFR BLD AUTO: 2.9 % — SIGNIFICANT CHANGE UP (ref 2–14)
NEUTROPHILS # BLD AUTO: 11.6 K/UL — HIGH (ref 1.8–7.4)
NEUTROPHILS NFR BLD AUTO: 90.2 % — HIGH (ref 43–77)
NRBC # BLD: 0 /100 WBCS — SIGNIFICANT CHANGE UP (ref 0–0)
PLATELET # BLD AUTO: 215 K/UL — SIGNIFICANT CHANGE UP (ref 150–400)
POTASSIUM SERPL-MCNC: 5.2 MMOL/L — SIGNIFICANT CHANGE UP (ref 3.5–5.3)
POTASSIUM SERPL-SCNC: 5.2 MMOL/L — SIGNIFICANT CHANGE UP (ref 3.5–5.3)
RBC # BLD: 4.89 M/UL — SIGNIFICANT CHANGE UP (ref 4.2–5.8)
RBC # FLD: 13.4 % — SIGNIFICANT CHANGE UP (ref 10.3–14.5)
SARS-COV-2 RNA SPEC QL NAA+PROBE: SIGNIFICANT CHANGE UP
SODIUM SERPL-SCNC: 130 MMOL/L — LOW (ref 135–145)
WBC # BLD: 12.84 K/UL — HIGH (ref 3.8–10.5)
WBC # FLD AUTO: 12.84 K/UL — HIGH (ref 3.8–10.5)

## 2021-08-02 PROCEDURE — 76376 3D RENDER W/INTRP POSTPROCES: CPT

## 2021-08-02 PROCEDURE — 73700 CT LOWER EXTREMITY W/O DYE: CPT

## 2021-08-02 PROCEDURE — 94003 VENT MGMT INPAT SUBQ DAY: CPT

## 2021-08-02 PROCEDURE — 85025 COMPLETE CBC W/AUTO DIFF WBC: CPT

## 2021-08-02 PROCEDURE — 82803 BLOOD GASES ANY COMBINATION: CPT

## 2021-08-02 PROCEDURE — 80053 COMPREHEN METABOLIC PANEL: CPT

## 2021-08-02 PROCEDURE — 94640 AIRWAY INHALATION TREATMENT: CPT

## 2021-08-02 PROCEDURE — 94002 VENT MGMT INPAT INIT DAY: CPT

## 2021-08-02 PROCEDURE — 93005 ELECTROCARDIOGRAM TRACING: CPT

## 2021-08-02 PROCEDURE — 86769 SARS-COV-2 COVID-19 ANTIBODY: CPT

## 2021-08-02 PROCEDURE — 87640 STAPH A DNA AMP PROBE: CPT

## 2021-08-02 PROCEDURE — 82962 GLUCOSE BLOOD TEST: CPT

## 2021-08-02 PROCEDURE — 82550 ASSAY OF CK (CPK): CPT

## 2021-08-02 PROCEDURE — 97161 PT EVAL LOW COMPLEX 20 MIN: CPT

## 2021-08-02 PROCEDURE — 83735 ASSAY OF MAGNESIUM: CPT

## 2021-08-02 PROCEDURE — 99284 EMERGENCY DEPT VISIT MOD MDM: CPT | Mod: 25

## 2021-08-02 PROCEDURE — 73610 X-RAY EXAM OF ANKLE: CPT

## 2021-08-02 PROCEDURE — 84484 ASSAY OF TROPONIN QUANT: CPT

## 2021-08-02 PROCEDURE — 83880 ASSAY OF NATRIURETIC PEPTIDE: CPT

## 2021-08-02 PROCEDURE — 87635 SARS-COV-2 COVID-19 AMP PRB: CPT

## 2021-08-02 PROCEDURE — 84145 PROCALCITONIN (PCT): CPT

## 2021-08-02 PROCEDURE — 36415 COLL VENOUS BLD VENIPUNCTURE: CPT

## 2021-08-02 PROCEDURE — 87641 MR-STAPH DNA AMP PROBE: CPT

## 2021-08-02 PROCEDURE — 71045 X-RAY EXAM CHEST 1 VIEW: CPT

## 2021-08-02 PROCEDURE — 85027 COMPLETE CBC AUTOMATED: CPT

## 2021-08-02 PROCEDURE — 84100 ASSAY OF PHOSPHORUS: CPT

## 2021-08-02 PROCEDURE — 99291 CRITICAL CARE FIRST HOUR: CPT

## 2021-08-02 PROCEDURE — 82553 CREATINE MB FRACTION: CPT

## 2021-08-02 PROCEDURE — 94660 CPAP INITIATION&MGMT: CPT

## 2021-08-02 PROCEDURE — 80048 BASIC METABOLIC PNL TOTAL CA: CPT

## 2021-08-02 RX ORDER — NYSTATIN CREAM 100000 [USP'U]/G
1 CREAM TOPICAL EVERY 12 HOURS
Refills: 0 | Status: DISCONTINUED | OUTPATIENT
Start: 2021-08-02 | End: 2021-08-02

## 2021-08-02 RX ORDER — SENNA PLUS 8.6 MG/1
2 TABLET ORAL
Qty: 60 | Refills: 0
Start: 2021-08-02 | End: 2021-08-31

## 2021-08-02 RX ADMIN — POLYETHYLENE GLYCOL 3350 17 GRAM(S): 17 POWDER, FOR SOLUTION ORAL at 12:00

## 2021-08-02 RX ADMIN — Medication 8: at 08:14

## 2021-08-02 RX ADMIN — OXYCODONE AND ACETAMINOPHEN 1 TABLET(S): 5; 325 TABLET ORAL at 06:12

## 2021-08-02 RX ADMIN — Medication 1 PUFF(S): at 02:26

## 2021-08-02 RX ADMIN — ALBUTEROL 2 PUFF(S): 90 AEROSOL, METERED ORAL at 02:26

## 2021-08-02 RX ADMIN — Medication 1 PUFF(S): at 08:16

## 2021-08-02 RX ADMIN — OXYCODONE AND ACETAMINOPHEN 1 TABLET(S): 5; 325 TABLET ORAL at 12:04

## 2021-08-02 RX ADMIN — Medication 8: at 11:58

## 2021-08-02 RX ADMIN — Medication 40 MILLIGRAM(S): at 05:49

## 2021-08-02 RX ADMIN — Medication 1 DROP(S): at 15:10

## 2021-08-02 RX ADMIN — Medication 1 PATCH: at 05:52

## 2021-08-02 RX ADMIN — Medication 1 PATCH: at 11:59

## 2021-08-02 RX ADMIN — Medication 1 PUFF(S): at 15:12

## 2021-08-02 RX ADMIN — CHLORHEXIDINE GLUCONATE 1 APPLICATION(S): 213 SOLUTION TOPICAL at 05:52

## 2021-08-02 RX ADMIN — OXYCODONE AND ACETAMINOPHEN 1 TABLET(S): 5; 325 TABLET ORAL at 13:00

## 2021-08-02 RX ADMIN — OXYCODONE AND ACETAMINOPHEN 1 TABLET(S): 5; 325 TABLET ORAL at 05:49

## 2021-08-02 RX ADMIN — PANTOPRAZOLE SODIUM 40 MILLIGRAM(S): 20 TABLET, DELAYED RELEASE ORAL at 05:49

## 2021-08-02 RX ADMIN — ENOXAPARIN SODIUM 40 MILLIGRAM(S): 100 INJECTION SUBCUTANEOUS at 11:59

## 2021-08-02 RX ADMIN — ALBUTEROL 2 PUFF(S): 90 AEROSOL, METERED ORAL at 08:17

## 2021-08-02 RX ADMIN — Medication 81 MILLIGRAM(S): at 11:59

## 2021-08-02 RX ADMIN — ALBUTEROL 2 PUFF(S): 90 AEROSOL, METERED ORAL at 15:10

## 2021-08-02 RX ADMIN — BUDESONIDE AND FORMOTEROL FUMARATE DIHYDRATE 2 PUFF(S): 160; 4.5 AEROSOL RESPIRATORY (INHALATION) at 15:11

## 2021-08-02 RX ADMIN — Medication 1 DROP(S): at 05:51

## 2021-08-02 RX ADMIN — RISPERIDONE 1 MILLIGRAM(S): 4 TABLET ORAL at 12:00

## 2021-08-02 NOTE — PROGRESS NOTE ADULT - ASSESSMENT
Hospital Course: 59 yo M with PMH including HTN, HLD, DM, CHF, CAD (w stents), COPD on 2.5L home 02 and night BIPAP (12/5 35%) presents from [facility] for shortness of breath. ICU was consulted and admitted pt for Acute on chronic hypercapnic respiratory failure requiring intubation.    ICU Course: The patient arrived on 7/25 to the ICU with severe acute hypoxic respiratory 2/2 COPD exacerbation and altered mental status. He was intubated on arrival for hypoxia and protection of airway. He also became hypotensive and developed thick secretions. A central line was placed and he was started on a levo drip and a course of cefepime. He was extubated on 7/28. He received a course of steroids, and inhalation treatment with albuterol and ipratropium. Patient is intermittently compliant with BIPAP. When not on BIPAP he is on 4 L NC. Patient is now off pressors. Central line and arterial line were removed prior to transfer. Tejada was removed and a condom cath placed.

## 2021-08-02 NOTE — PROGRESS NOTE ADULT - PROBLEM SELECTOR PLAN 4
Continue lantus 10U at bedtime and sliding scale coverage.  Monitor glucose.
Continue current medications.  Echo:  Mild tricuspid reurg. Right ventricular enlargement, Moderate pulmonary hypertension. R ventricular pressure 59.

## 2021-08-02 NOTE — PROGRESS NOTE ADULT - SUBJECTIVE AND OBJECTIVE BOX
Patient is a 61y old  Male who presents with a chief complaint of AHHRF (02 Aug 2021 10:50)    PATIENT IS SEEN AND EXAMINED IN MEDICAL FLOOR.  NGT [    ]    MT [   ]      GT [   ]    ALLERGIES:  No Known Allergies      Daily     Daily     VITALS:    Vital Signs Last 24 Hrs  T(C): 36.4 (02 Aug 2021 04:36), Max: 36.7 (01 Aug 2021 20:51)  T(F): 97.6 (02 Aug 2021 04:36), Max: 98 (01 Aug 2021 20:51)  HR: 104 (02 Aug 2021 12:42) (97 - 116)  BP: 118/73 (02 Aug 2021 12:42) (95/50 - 118/73)  BP(mean): --  RR: 20 (02 Aug 2021 04:36) (19 - 20)  SpO2: 96% (02 Aug 2021 12:42) (95% - 97%)    LABS:    CBC Full  -  ( 02 Aug 2021 12:55 )  WBC Count : 12.84 K/uL  RBC Count : 4.89 M/uL  Hemoglobin : 13.7 g/dL  Hematocrit : 43.8 %  Platelet Count - Automated : 215 K/uL  Mean Cell Volume : 89.6 fl  Mean Cell Hemoglobin : 28.0 pg  Mean Cell Hemoglobin Concentration : 31.3 gm/dL  Auto Neutrophil # : 11.60 K/uL  Auto Lymphocyte # : 0.74 K/uL  Auto Monocyte # : 0.37 K/uL  Auto Eosinophil # : 0.02 K/uL  Auto Basophil # : 0.01 K/uL  Auto Neutrophil % : 90.2 %  Auto Lymphocyte % : 5.8 %  Auto Monocyte % : 2.9 %  Auto Eosinophil % : 0.2 %  Auto Basophil % : 0.1 %      08-02    130<L>  |  98  |  22<H>  ----------------------------<  400<H>  5.2   |  30  |  0.72    Ca    8.8      02 Aug 2021 12:55      CAPILLARY BLOOD GLUCOSE      POCT Blood Glucose.: 338 mg/dL (02 Aug 2021 11:29)  POCT Blood Glucose.: 301 mg/dL (02 Aug 2021 07:59)  POCT Blood Glucose.: 258 mg/dL (01 Aug 2021 21:07)  POCT Blood Glucose.: 334 mg/dL (01 Aug 2021 16:18)          Creatinine Trend: 0.72<--, 0.74<--, 0.34<--, 0.41<--, 0.44<--, 0.51<--  I&O's Summary    01 Aug 2021 07:01  -  02 Aug 2021 07:00  --------------------------------------------------------  IN: 0 mL / OUT: 1000 mL / NET: -1000 mL                MEDICATIONS:    MEDICATIONS  (STANDING):  ALBUTerol    90 MICROgram(s) HFA Inhaler 2 Puff(s) Inhalation every 6 hours  artificial  tears Solution 1 Drop(s) Both EYES three times a day  aspirin  chewable 81 milliGRAM(s) Oral daily  budesonide 160 MICROgram(s)/formoterol 4.5 MICROgram(s) Inhaler 2 Puff(s) Inhalation two times a day  cefepime   IVPB 2000 milliGRAM(s) IV Intermittent every 12 hours  chlorhexidine 2% Cloths 1 Application(s) Topical <User Schedule>  dextrose 50% Injectable 12.5 Gram(s) IV Push once  enoxaparin Injectable 40 milliGRAM(s) SubCutaneous daily  insulin glargine Injectable (LANTUS) 10 Unit(s) SubCutaneous at bedtime  insulin lispro (ADMELOG) corrective regimen sliding scale   SubCutaneous three times a day before meals  ipratropium 17 MICROgram(s) HFA Inhaler 1 Puff(s) Inhalation every 6 hours  nicotine -  14 mG/24Hr(s) Patch 1 patch Transdermal daily  pantoprazole    Tablet 40 milliGRAM(s) Oral before breakfast  polyethylene glycol 3350 17 Gram(s) Oral daily  risperiDONE   Tablet 1 milliGRAM(s) Oral daily  senna 2 Tablet(s) Oral at bedtime  traZODone 150 milliGRAM(s) Oral at bedtime      MEDICATIONS  (PRN):  nystatin Powder 1 Application(s) Topical every 12 hours PRN moisture  sodium chloride 0.9% lock flush 10 milliLiter(s) IV Push every 1 hour PRN Pre/post blood products, medications, blood draw, and to maintain line patency      REVIEW OF SYSTEMS:                           ALL ROS DONE [ X   ]    CONSTITUTIONAL:  LETHARGIC [   ], FEVER [   ], UNRESPONSIVE [   ]  CVS:  CP  [   ], SOB, [   ], PALPITATIONS [   ], DIZZYNESS [   ]  RS: COUGH [   ], SPUTUM [   ]  GI: ABDOMINAL PAIN [   ], NAUSEA [   ], VOMITINGS [   ], DIARRHEA [   ], CONSTIPATION [   ]  :  DYSURIA [   ], NOCTURIA [   ], INCREASED FREQUENCY [   ], DRIBLING [   ],  SKELETAL: PAINFUL JOINTS [   ], SWOLLEN JOINTS [   ], NECK ACHE [   ], LOW BACK ACHE [   ],  SKIN : ULCERS [   ], RASH [   ], ITCHING [   ]  CNS: HEAD ACHE [   ], DOUBLE VISION [   ], BLURRED VISION [   ], AMS / CONFUSION [   ], SEIZURES [   ], WEAKNESS [   ],TINGLING / NUMBNESS [   ]      PHYSICAL EXAMINATION:  GENERAL APPEARANCE: NO DISTRESS  HEENT:  NO PALLOR, NO  JVD,  NO   NODES, NECK SUPPLE  CVS: S1 +, S2 +,   RS: AEEB,  OCCASIONAL  RALES +,   RHONCHI, ON VENTIMASK  ABD: SOFT, NT, NO, BS +    NGTUBE+  EXT: NO PE  SKIN: WARM,   CVC+  SKELETAL:  ROM ACCEPTABLE, LEFT ANKLE   CNS:  AAO X 2-3, NO  DEFICITS    RADIOLOGY :    EXAM:  XR CHEST PORTABLE URGENT 1V                            PROCEDURE DATE:  07/25/2021          INTERPRETATION:  History: Shortness of breath. Difficulty breathing.    FINDINGS: Frontal chest.    COMPARISON: 7/24/2021.    Exam is limited due to collimation at the lung bases.    Heart size may be exaggerated by AP technique. Mild bronchovascular crowding at the bases. There is no focal lung consolidation or sizable pleural effusion. No significant osseous abnormality. Degenerative change of the spine.    IMPRESSION:    Unremarkable frontal chest.    ==================================================    EXAM:  XR ANKLE COMP MIN 3 VIEWS LT                            PROCEDURE DATE:  07/24/2021          INTERPRETATION:  History: Fall, ankle pain.    FINDINGS: Frontal, lateral and oblique projections of the left ankle.    Diffuse soft tissue swelling. Osteopenia. No fracture or dislocation. Ankle mortise is aligned. Extensive vascular calcification.    IMPRESSION:    No fracture or dislocation left ankle appreciated.      ASSESSMENT :       PLAN:  HPI:  59 yo M with PMH including HTN, HLD, DM, CHF, CAD (w stents), COPD on 2.5L home 02 and night BIPAP (12/5 35%) presents from [facility] for shortness of breath. When EMS arrived pt was noted to be in respiratory distress and his portable O2 machine was uncharged and not running. Pt endorsed to ED physician his SOB has worsened overnight after being discharged 1 day PTA. Off note Pt was here last night for left ankle pain, He was diagnosed with non-displaced left lateral malleolus fracture. Pt was noted to be tachycardic and sob but refused admission and discharged with cast. Pt was Ax0 x3 in ED but Upon my examination pt was lethargic and unable to answer any questions.  (25 Jul 2021 09:56)      # DC PLAN TO [facility] AL, NOTED PT AND OT EVALUATION     # ACUTE ON CHRONIC HYPOXIC HYPERCAPNEIC RESPIRATORY FAILURE, EMPHYSEMA - S/P INTUBATION, ON PREDNISONE [TAPERED FROM SOLUMEDROL], ON DUONEB, CEFEPIME  ( COUNSELLED PATIENT TO QUIT SMOKING - ACTIVE SMOKER )   - S/P EXTUBATION AND IS ON NC - TOLERATING WELL   - COUNSELLED ON BIPAP COMPLIANCE  # MORBIDLY OBESE WITH RESTRICTIVE LUNG DISEASE, SUSPECT OBSTRUCTIVE SLEEP APNOEA , COPD ON HOME O2 NC    # NONDISPLACED LEFT LATERAL MALLEOLUS FRACTURE - PODIATRY CONSULT - CT OF LEFT ANKLE W/ NO ACUTE FRACTURE ,  REVIEWED PT AND OT EVALUATION   #  CHRONIC LOW BACK ACHE AND PANIC DISORDER - START PERCOCET -  HOME DOSE  ,  (  HOLD XANAX FOR NOW )      # AMS , ACUTE METABOLIC ENCEPHALOPATHY - SUSPECT S/T HYPERCAPNOEA - RESOLVING     # HTN, DM TYPE 2, HLD, CAD, S/P STENTS, CHRONIC SYSTOLIC CHF   # COPD, EMPHYSEMA, TOBACCO ABUSE    #  GI AND DVT PROPHYLAXIS    NAEEM CABELLO MD COVERING ADRIANA CABELLO MD

## 2021-08-02 NOTE — PROGRESS NOTE ADULT - PROBLEM SELECTOR PLAN 8
DVT and GI prophylaxis.  Restarted bronchodilators and ICS  Patient has oxygen and Trilogy at AL.  Low threshold for reintubation secondary to noncompliance  Remains FULL CODE as per pt's wishes.   Medically stable for discharge.
DVT and GI prophylaxis.  Restarted bronchodilators and ICS  F/U CT left lower leg.  Patient has oxygen and Trilogy at AL.  Low threshold for reintubation secondary to noncompliance  Remains FULL CODE as per pt's wishes.

## 2021-08-02 NOTE — PROGRESS NOTE ADULT - PROBLEM SELECTOR PLAN 2
GOLD 4D.  Multiple exacerbations this year.  Continue BIPAP nightly and as needed during the day. Has home trilogy.  Continue oxygen. Monitor saturation.  Bronchodilators and ICS  Symbicort and spiriva.  Continue prednisone  Consider adding Daliresp to help control COPD.
GOLD 4D.  Multiple exacerbations this year.  Continue BIPAP nightly and as needed during the day. Has home trilogy.  Continue oxygen. Monitor saturation.  Restart bronchodilators and ICS  Symbicort and spiriva.  Continue prednisone  Consider adding Daliresp to help control COPD

## 2021-08-02 NOTE — PROGRESS NOTE ADULT - PROVIDER SPECIALTY LIST ADULT
Internal Medicine
Critical Care
Internal Medicine
Internal Medicine
Pulmonology
Critical Care
Internal Medicine
Pulmonology
Pulmonology
Internal Medicine
Podiatry
Critical Care
Internal Medicine

## 2021-08-02 NOTE — PROGRESS NOTE ADULT - PROBLEM SELECTOR PLAN 5
Continue current medications.  Maintain safety measures.
Continue current medications.  Maintain safety measures.

## 2021-08-02 NOTE — PROGRESS NOTE ADULT - NUTRITIONAL ASSESSMENT
This patient has been assessed with a concern for Malnutrition and has been determined to have a diagnosis/diagnoses of Moderate protein-calorie malnutrition.    This patient is being managed with:   Diet DASH/TLC-  Sodium & Cholesterol Restricted  Soft  Consistent Carbohydrate {No Snacks}  Entered: Jul 29 2021 12:09PM    
This patient has been assessed with a concern for Malnutrition and has been determined to have a diagnosis/diagnoses of Moderate protein-calorie malnutrition.    This patient is being managed with:   Diet NPO with Tube Feed-  Tube Feeding Modality: Nasogastric  Glucerna 1.5 Hernán  Continuous  Starting Tube Feed Rate {mL per Hour}: 10  Increase Tube Feed Rate by (mL): 10     Every 2 hours  Until Goal Tube Feed Rate (mL per Hour): 30  Tube Feed Duration (in Hours): 24  Tube Feed Start Time: 06:30  Tube Feed Stop Time: 21:30  Entered: Jul 28 2021  5:46PM    
This patient has been assessed with a concern for Malnutrition and has been determined to have a diagnosis/diagnoses of Moderate protein-calorie malnutrition.    This patient is being managed with:   Diet DASH/TLC-  Sodium & Cholesterol Restricted  Soft  Consistent Carbohydrate {No Snacks}  Entered: Jul 29 2021 12:09PM    

## 2021-08-02 NOTE — PHYSICAL THERAPY INITIAL EVALUATION ADULT - GENERAL OBSERVATIONS, REHAB EVAL
Consult received, chart reviewed. Patient received supine in bed, NAD, NC. Ace wrap Lt ankle. Patient agreed to EVALUATION from Physical Therapist.

## 2021-08-02 NOTE — PROGRESS NOTE ADULT - SUBJECTIVE AND OBJECTIVE BOX
NP Note discussed with  Primary Attending    Patient is a 61y old  Male who presents with a chief complaint of AHHRF (01 Aug 2021 15:13)      INTERVAL HPI/OVERNIGHT EVENTS: no new complaints    MEDICATIONS  (STANDING):  ALBUTerol    90 MICROgram(s) HFA Inhaler 2 Puff(s) Inhalation every 6 hours  artificial  tears Solution 1 Drop(s) Both EYES three times a day  aspirin  chewable 81 milliGRAM(s) Oral daily  budesonide 160 MICROgram(s)/formoterol 4.5 MICROgram(s) Inhaler 2 Puff(s) Inhalation two times a day  cefepime   IVPB 2000 milliGRAM(s) IV Intermittent every 12 hours  chlorhexidine 2% Cloths 1 Application(s) Topical <User Schedule>  dextrose 50% Injectable 12.5 Gram(s) IV Push once  enoxaparin Injectable 40 milliGRAM(s) SubCutaneous daily  insulin glargine Injectable (LANTUS) 10 Unit(s) SubCutaneous at bedtime  insulin lispro (ADMELOG) corrective regimen sliding scale   SubCutaneous three times a day before meals  ipratropium 17 MICROgram(s) HFA Inhaler 1 Puff(s) Inhalation every 6 hours  nicotine -  14 mG/24Hr(s) Patch 1 patch Transdermal daily  pantoprazole    Tablet 40 milliGRAM(s) Oral before breakfast  polyethylene glycol 3350 17 Gram(s) Oral daily  risperiDONE   Tablet 1 milliGRAM(s) Oral daily  senna 2 Tablet(s) Oral at bedtime  traZODone 150 milliGRAM(s) Oral at bedtime    MEDICATIONS  (PRN):  nystatin Powder 1 Application(s) Topical every 12 hours PRN moisture  oxycodone    5 mG/acetaminophen 325 mG 1 Tablet(s) Oral every 6 hours PRN Severe Pain (7 - 10)  sodium chloride 0.9% lock flush 10 milliLiter(s) IV Push every 1 hour PRN Pre/post blood products, medications, blood draw, and to maintain line patency      __________________________________________________  REVIEW OF SYSTEMS:    CONSTITUTIONAL: No fever,   EYES: no acute visual disturbances  NECK: No pain or stiffness  RESPIRATORY: Intermittent SOB with nonproductive cough  CARDIOVASCULAR: No chest pain, no palpitations  GASTROINTESTINAL: No pain. No nausea or vomiting; No diarrhea   NEUROLOGICAL: No headache or numbness, no tremors  MUSCULOSKELETAL: No joint pain, no muscle pain  GENITOURINARY: no dysuria, no frequency, no hesitancy  PSYCHIATRY: no depression , no anxiety  ALL OTHER  ROS negative        Vital Signs Last 24 Hrs  T(C): 36.4 (02 Aug 2021 04:36), Max: 36.7 (01 Aug 2021 20:51)  T(F): 97.6 (02 Aug 2021 04:36), Max: 98 (01 Aug 2021 20:51)  HR: 99 (02 Aug 2021 04:36) (97 - 116)  BP: 112/70 (02 Aug 2021 04:36) (95/50 - 112/70)  BP(mean): --  RR: 20 (02 Aug 2021 04:36) (19 - 20)  SpO2: 96% (02 Aug 2021 04:36) (93% - 97%)    ________________________________________________  PHYSICAL EXAM:  GENERAL: NAD  HEENT: Normocephalic;  conjunctivae and sclerae clear; moist mucous membranes;   NECK : supple, No JVD  CHEST/LUNG: Diminished breath  HEART: S1 S2  regular; no murmurs, gallops or rubs  ABDOMEN: Soft, Obese. Nontender, Nondistended; Bowel sounds present  EXTREMITIES: no cyanosis; no edema; no calf tenderness  SKIN: warm and dry; no rash  NERVOUS SYSTEM:  Awake and alert; Oriented  to place, person and time ; no new deficits    _________________________________________________  LABS:                        12.8   8.37  )-----------( 177      ( 01 Aug 2021 06:06 )             41.3     08-01    137  |  101  |  18  ----------------------------<  332<H>  4.6   |  34<H>  |  0.74    Ca    8.5      01 Aug 2021 06:06          CAPILLARY BLOOD GLUCOSE      POCT Blood Glucose.: 301 mg/dL (02 Aug 2021 07:59)  POCT Blood Glucose.: 258 mg/dL (01 Aug 2021 21:07)  POCT Blood Glucose.: 334 mg/dL (01 Aug 2021 16:18)  POCT Blood Glucose.: 415 mg/dL (01 Aug 2021 11:47)        RADIOLOGY & ADDITIONAL TESTS:    Imaging Personally Reviewed:  YES  < from: Xray Chest 1 View- PORTABLE-Routine (Xray Chest 1 View- PORTABLE-Routine in AM.) (07.28.21 @ 06:53) >  FINDINGS: ET tube tip above tracheal bifurcation.  NG tube tip beyond GE junction.  RIGHT IJ catheter tip in SVC.      The lungs show asymmetric haziness overlying the LEFT hemithorax which may indicate pleural effusion layering along the LEFT posterior thoracic wall. Remaining lung parenchyma clear.  . No pneumothorax.    The heart and mediastinum size and configuration are within normal limits.    Visualized osseous structures are intact.    IMPRESSION:  Mild haziness overlying LEFT hemithorax/pleural effusion.  ET tube tip above tracheal bifurcation.  NG tube tip beyond GE junction.  RIGHT IJ catheter tip in SVC.    --- End of Report ---    < end of copied text >  < from: CT Ankle No Cont, Left (08.01.21 @ 12:34) >  BONE: No acute fracture. Incidental note is made of an os trigonum posterior to the talus and os intermetatarseum on the dorsal aspect of the space between the medial cuneiform and base of the second metatarsal. No cortical destruction or periosteal new bone formation. No dislocation. Cartilage spaces are maintained. No joint effusion.    SOFT TISSUE: Vascular calcifications. Mild atrophy of the intrinsic musculature of the foot. Normal CT appearance of the imaged tendons.    IMPRESSION:  No acute fracture or dislocation.    < end of copied text >      Consultant(s) Notes Reviewed:   YES    Care Discussed with Consultants :     Plan of care was discussed with patient and /or primary care giver; all questions and concerns were addressed and care was aligned with patient's wishes.

## 2021-08-02 NOTE — PROGRESS NOTE ADULT - PROBLEM SELECTOR PLAN 3
Continue lantus 10U at bedtime and sliding scale coverage.  Monitor glucose.
Continue current medications.  Echo:  Mild tricuspid reurg. Right ventricular enlargement, Moderate pulmonary hypertension. R ventricular pressure 59

## 2021-08-02 NOTE — DISCHARGE NOTE NURSING/CASE MANAGEMENT/SOCIAL WORK - PATIENT PORTAL LINK FT
You can access the FollowMyHealth Patient Portal offered by [facility] by registering at the following website: [email or web address]. By joining [facility]’s FollowMyHealth portal, you will also be able to view your health information using other applications (apps) compatible with our system.

## 2021-08-02 NOTE — PHYSICAL THERAPY INITIAL EVALUATION ADULT - CRITERIA FOR SKILLED THERAPEUTIC INTERVENTIONS
Pt. presents with above noted impairments however is a baseline level of function and independent with functional mobility. Skilled, therapeutic PT intervention not indicated at this time.

## 2021-08-02 NOTE — PROGRESS NOTE ADULT - REASON FOR ADMISSION
AHHRF

## 2021-08-02 NOTE — PROGRESS NOTE ADULT - SUBJECTIVE AND OBJECTIVE BOX
Time of Visit:  Patient seen and examined.     MEDICATIONS  (STANDING):  ALBUTerol    90 MICROgram(s) HFA Inhaler 2 Puff(s) Inhalation every 6 hours  artificial  tears Solution 1 Drop(s) Both EYES three times a day  aspirin  chewable 81 milliGRAM(s) Oral daily  budesonide 160 MICROgram(s)/formoterol 4.5 MICROgram(s) Inhaler 2 Puff(s) Inhalation two times a day  cefepime   IVPB 2000 milliGRAM(s) IV Intermittent every 12 hours  chlorhexidine 2% Cloths 1 Application(s) Topical <User Schedule>  dextrose 50% Injectable 12.5 Gram(s) IV Push once  enoxaparin Injectable 40 milliGRAM(s) SubCutaneous daily  insulin glargine Injectable (LANTUS) 10 Unit(s) SubCutaneous at bedtime  insulin lispro (ADMELOG) corrective regimen sliding scale   SubCutaneous three times a day before meals  ipratropium 17 MICROgram(s) HFA Inhaler 1 Puff(s) Inhalation every 6 hours  nicotine -  14 mG/24Hr(s) Patch 1 patch Transdermal daily  pantoprazole    Tablet 40 milliGRAM(s) Oral before breakfast  polyethylene glycol 3350 17 Gram(s) Oral daily  risperiDONE   Tablet 1 milliGRAM(s) Oral daily  senna 2 Tablet(s) Oral at bedtime  traZODone 150 milliGRAM(s) Oral at bedtime      MEDICATIONS  (PRN):  nystatin Powder 1 Application(s) Topical every 12 hours PRN moisture  sodium chloride 0.9% lock flush 10 milliLiter(s) IV Push every 1 hour PRN Pre/post blood products, medications, blood draw, and to maintain line patency       Medications up to date at time of exam.    ROS; No fever, chills, cough, congestion on exam. Denies SOB on exam.   PHYSICAL EXAMINATION:    Vital Signs Last 24 Hrs  T(C): 36.4 (02 Aug 2021 04:36), Max: 36.7 (01 Aug 2021 20:51)  T(F): 97.6 (02 Aug 2021 04:36), Max: 98 (01 Aug 2021 20:51)  HR: 104 (02 Aug 2021 12:42) (97 - 116)  BP: 118/73 (02 Aug 2021 12:42) (95/50 - 118/73)  BP(mean): --  RR: 20 (02 Aug 2021 04:36) (19 - 20)  SpO2: 96% (02 Aug 2021 12:42) (95% - 97%)   (if applicable)    General: Alert and oriented. Able to answer question at rest with no SOB. No acute distress.      HEENT: Head is normocephalic and atraumatic. Extraocular muscles are intact. Mucous membranes are moist.     NECK: Supple, no palpable adenopathy.    LUNGS: Clear to auscultation bilaterally with  no wheezing, rales, or rhonchi. No use of accessory muscle.     HEART: S1 S2 Regular rate and no click/ rub.     ABDOMEN: Soft, nontender, and nondistended.  No hepatosplenomegaly is noted. Active bowel sounds.     : No painful voiding, no pelvic pain    EXTREMITIES: Without any cyanosis, clubbing, rash, lesions or edema. Right ankle wrapped     NEUROLOGIC: Awake, alert, oriented, grossly intact    SKIN: Warm and moist. Non diaphoretic.     LABS:                        13.7   12.84 )-----------( 215      ( 02 Aug 2021 12:55 )             43.8     08-01    137  |  101  |  18  ----------------------------<  332<H>  4.6   |  34<H>  |  0.74    Ca    8.5      01 Aug 2021 06:06    MICROBIOLOGY: (if applicable)    RADIOLOGY & ADDITIONAL STUDIES:  EKG:   CXR:  ECHO:    IMPRESSION: 61y Male PAST MEDICAL & SURGICAL HISTORY:  COPD (chronic obstructive pulmonary disease)    Stented coronary artery    HLD (hyperlipidemia)    Pulmonary HTN    Type 2 diabetes mellitus without complication, with long-term current use of insulin    Coronary artery disease involving native coronary artery of native heart without angina pectoris    Bipolar 1 disorder    Smoker    Gastroesophageal reflux disease, esophagitis presence not specified    Oxygen dependent    COPD (chronic obstructive pulmonary disease)    DM (diabetes mellitus)    CAD (coronary artery disease)    GERD (gastroesophageal reflux disease)    HLD (hyperlipidemia)    Insomnia    Polyarthritis    No significant past surgical history    Impression: 59 Y/O male from [facility] presented in ICU on 07-25-21 with severe acute hypoxic respiratory 2/2 COPD exacerbation and altered mental status. He was intubated on arrival for hypoxia and protection of airway. Was extubated on 07-28-21 and now on Oxygen supplementation and Bipap at Night. He was intermittently complaint with Bipap. 08-02-21 Negative Covid 19 PCR.       Suggestions:  O2 saturation 96% with O2 supplement. Continue Oxygen supplementation 2L-3L NC. Has Trilogy at [facility] and Non compliant with Bipap here but verbalized that he will comply now with Trilogy.  Continue BiPaP  10/ 5 with 40 % O2 at night and alternate with O2 via NC to maintain sat > 90%. He is known to be non compliant with PAP.   COntinue Budesonide 2 puffs Twice Daily.    Continue Albuterol rescue inhaler 2 puffs Q 6 Hours. Ipratropium 1 puff Q 6 Hours.   Reinforced the importance of smoking cessation and verbalized that he will quit and Continue nicotine patch 14 daily.   Moderate pul hypertension probable due to COPD and obesity   DVT/ GI prophylactic.

## 2021-08-02 NOTE — PROGRESS NOTE ADULT - PROBLEM SELECTOR PLAN 6
left ankle xray reviewed. No discernable fractures.  CT lower leg ordered to R/O Fx
Continue nicotine patch  Smoking cessation counseling.

## 2021-08-02 NOTE — PROGRESS NOTE ADULT - NSICDXPILOT_GEN_ALL_CORE
[place]

## 2021-08-02 NOTE — PROGRESS NOTE ADULT - PROBLEM SELECTOR PLAN 1
Secondary to COPD exacerbation.  Has multiple exacerbations during the past year.  GOLD 4D  Continue BIPAP nightly and as needed during the day.  Continue oxygen. Currently on 3LPM. Monitor oxygen saturation.  Continue bronchodilators and ICS  Symbicort and spiriva.  Continue prednisone  Consider adding Daliresp to help control COPD.
Secondary to COPD exacerbation.  Has multiple exacerbations during the past year.  GOLD 4D  Continue BIPAP nightly and as needed during the day.  Continue oxygen. Currently on 3LPM. Monitor oxygen saturation.  Restart bronchodilators and ICS  Symbicort and spiriva.  Continue prednisone  Consider adding Daliresp to help control COPD

## 2021-08-20 ENCOUNTER — EMERGENCY (EMERGENCY)
Facility: HOSPITAL | Age: 61
LOS: 1 days | Discharge: HOME | End: 2021-08-20
Attending: EMERGENCY MEDICINE
Payer: MEDICAID

## 2021-08-20 VITALS
TEMPERATURE: 99 F | WEIGHT: 199.96 LBS | HEIGHT: 68 IN | SYSTOLIC BLOOD PRESSURE: 123 MMHG | HEART RATE: 116 BPM | RESPIRATION RATE: 18 BRPM | OXYGEN SATURATION: 92 % | DIASTOLIC BLOOD PRESSURE: 73 MMHG

## 2021-08-20 VITALS
TEMPERATURE: 98 F | SYSTOLIC BLOOD PRESSURE: 108 MMHG | HEART RATE: 103 BPM | OXYGEN SATURATION: 95 % | RESPIRATION RATE: 18 BRPM | DIASTOLIC BLOOD PRESSURE: 73 MMHG

## 2021-08-20 PROCEDURE — 71045 X-RAY EXAM CHEST 1 VIEW: CPT | Mod: 26

## 2021-08-20 PROCEDURE — 99284 EMERGENCY DEPT VISIT MOD MDM: CPT

## 2021-08-20 RX ORDER — IPRATROPIUM/ALBUTEROL SULFATE 18-103MCG
3 AEROSOL WITH ADAPTER (GRAM) INHALATION
Refills: 0 | Status: COMPLETED | OUTPATIENT
Start: 2021-08-20 | End: 2021-08-20

## 2021-08-20 RX ORDER — OXYCODONE AND ACETAMINOPHEN 5; 325 MG/1; MG/1
1 TABLET ORAL ONCE
Refills: 0 | Status: DISCONTINUED | OUTPATIENT
Start: 2021-08-20 | End: 2021-08-20

## 2021-08-20 RX ADMIN — Medication 3 MILLILITER(S): at 17:44

## 2021-08-20 RX ADMIN — Medication 50 MILLIGRAM(S): at 18:45

## 2021-08-20 RX ADMIN — Medication 3 MILLILITER(S): at 17:12

## 2021-08-20 RX ADMIN — Medication 3 MILLILITER(S): at 17:35

## 2021-08-20 RX ADMIN — OXYCODONE AND ACETAMINOPHEN 1 TABLET(S): 5; 325 TABLET ORAL at 18:45

## 2021-08-20 NOTE — ED PROVIDER NOTE - CLINICAL SUMMARY MEDICAL DECISION MAKING FREE TEXT BOX
60 y/o man, h/o COPD, oxygen-dependent, pulmonary HTN, CAD, DM, bipolar disorder, c/o shortness of breath x about 4 hours. 62 y/o man, h/o COPD, oxygen-dependent, pulmonary HTN, CAD, DM, bipolar disorder, c/o shortness of breath x about 4 hours--neb Tx, steroids, CXR, reassess.

## 2021-08-20 NOTE — ED PROVIDER NOTE - NSICDXPASTMEDICALHX_GEN_ALL_CORE_FT
PAST MEDICAL HISTORY:  Bipolar 1 disorder     CAD (coronary artery disease)     COPD (chronic obstructive pulmonary disease) Oxygen 2.5L at home    Coronary artery disease involving native coronary artery of native heart without angina pectoris     DM (diabetes mellitus)     Gastroesophageal reflux disease, esophagitis presence not specified     GERD (gastroesophageal reflux disease)     HLD (hyperlipidemia)     Insomnia     Oxygen dependent     Polyarthritis     Pulmonary HTN     Smoker     Stented coronary artery 2017    Type 2 diabetes mellitus without complication, with long-term current use of insulin

## 2021-08-20 NOTE — ED ADULT NURSE NOTE - NSIMPLEMENTINTERV_GEN_ALL_ED
Implemented All Fall with Harm Risk Interventions:  [place] to call system. Call bell, personal items and telephone within reach. Instruct patient to call for assistance. Room bathroom lighting operational. Non-slip footwear when patient is off stretcher. Physically safe environment: no spills, clutter or unnecessary equipment. Stretcher in lowest position, wheels locked, appropriate side rails in place. Provide visual cue, wrist band, yellow gown, etc. Monitor gait and stability. Monitor for mental status changes and reorient to person, place, and time. Review medications for side effects contributing to fall risk. Reinforce activity limits and safety measures with patient and family. Provide visual clues: red socks.

## 2021-08-20 NOTE — ED PROVIDER NOTE - NSFOLLOWUPINSTRUCTIONS_ED_ALL_ED_FT
[facility]                                                                                                          Chronic Obstructive Pulmonary Disease Exacerbation       Chronic obstructive pulmonary disease (COPD) is a long-term (chronic) condition that affects the lungs. COPD is a general term that can be used to describe many different lung problems that cause lung swelling (inflammation) and limit airflow, including chronic bronchitis and emphysema. COPD exacerbations are episodes when breathing symptoms become much worse and require extra treatment.    COPD exacerbations are usually caused by infections. Without treatment, COPD exacerbations can be severe and even life threatening. Frequent COPD exacerbations can cause further damage to the lungs.      What are the causes?  This condition may be caused by:  •Respiratory infections, including viral and bacterial infections.      •Exposure to smoke.      •Exposure to air pollution, chemical fumes, or dust.      •Things that give you an allergic reaction (allergens).      •Not taking your usual COPD medicines as directed.      •Underlying medical problems, such as congestive heart failure or infections not involving the lungs.      In many cases, the cause (trigger) of this condition is not known.      What increases the risk?  The following factors may make you more likely to develop this condition:  •Smoking cigarettes.      •Old age.      •Frequent prior COPD exacerbations.        What are the signs or symptoms?  Symptoms of this condition include:  •Increased coughing.      •Increased production of mucus from your lungs (sputum).      •Increased wheezing.      •Increased shortness of breath.      •Rapid or labored breathing.      •Chest tightness.      •Less energy than usual.      •Sleep disruption from symptoms.      •Confusion or increased sleepiness.      Often these symptoms happen or get worse even with the use of medicines.      How is this diagnosed?  This condition is diagnosed based on:  •Your medical history.      •A physical exam.    You may also have tests, including:  •A chest X-ray.      •Blood tests.      •Lung (pulmonary) function tests.        How is this treated?  Treatment for this condition depends on the severity and cause of the symptoms. You may need to be admitted to a hospital for treatment. Some of the treatments commonly used to treat COPD exacerbations are:  •Antibiotic medicines. These may be used for severe exacerbations caused by a lung infection, such as pneumonia.      •Bronchodilators. These are inhaled medicines that expand the air passages and allow increased airflow.      •Steroid medicines. These act to reduce inflammation in the airways. They may be given with an inhaler, taken by mouth, or given through an IV tube inserted into one of your veins.      •Supplemental oxygen therapy.      •Airway clearing techniques, such as noninvasive ventilation (NIV) and positive expiratory pressure (PEP). These provide respiratory support through a mask or other noninvasive device. An example of this would be using a continuous positive airway pressure (CPAP) machine to improve delivery of oxygen into your lungs.        Follow these instructions at home:    Medicines     •Take over-the-counter and prescription medicines only as told by your health care provider. It is important to use correct technique with inhaled medicines.      •If you were prescribed an antibiotic medicine or oral steroid, take it as told by your health care provider. Do not stop taking the medicine even if you start to feel better.      Lifestyle     •Eat a healthy diet.      •Exercise regularly.      •Get plenty of sleep.      •Avoid exposure to all substances that irritate the airway, especially to tobacco smoke.      •Wash your hands often with soap and water to reduce the risk of infection. If soap and water are not available, use hand .      •During flu season, avoid enclosed spaces that are crowded with people.      General instructions     •Drink enough fluid to keep your urine clear or pale yellow (unless you have a medical condition that requires fluid restriction).      •Use a cool mist vaporizer. This humidifies the air and makes it easier for you to clear your chest when you cough.      •If you have a home nebulizer and oxygen, continue to use them as told by your health care provider.      •Keep all follow-up visits as told by your health care provider. This is important.        How is this prevented?    •Stay up-to-date on pneumococcal and influenza (flu) vaccines. A flu shot is recommended every year to help prevent exacerbations.      • Do not use any products that contain nicotine or tobacco, such as cigarettes and e-cigarettes. Quitting smoking is very important in preventing COPD from getting worse and in preventing exacerbations from happening as often. If you need help quitting, ask your health care provider.      •Follow all instructions for pulmonary rehabilitation after a recent exacerbation. This can help prevent future exacerbations.      •Work with your health care provider to develop and follow an action plan. This tells you what steps to take when you experience certain symptoms.        Contact a health care provider if:    •You have a worsening of your regular COPD symptoms.        Get help right away if:    •You have worsening shortness of breath, even when resting.      •You have trouble talking.      •You have severe chest pain.      •You cough up blood.      •You have a fever.      •You have weakness, vomit repeatedly, or faint.      •You feel confused.      •You are not able to sleep because of your symptoms.      •You have trouble doing daily activities.        Summary    •COPD exacerbations are episodes when breathing symptoms become much worse and require extra treatment above your normal treatment.      •Exacerbations can be severe and even life threatening. Frequent COPD exacerbations can cause further damage to your lungs.      •COPD exacerbations are usually triggered by infections such as the flu, colds, and even pneumonia.      •Treatment for this condition depends on the severity and cause of the symptoms. You may need to be admitted to a hospital for treatment.      •Quitting smoking is very important to prevent COPD from getting worse and to prevent exacerbations from happening as often.      This information is not intended to replace advice given to you by your health care provider. Make sure you discuss any questions you have with your health care provider.      Document Revised: 11/30/2018 Document Reviewed: 01/22/2018    [employer] Patient Education © 2021 [employer] Chronic Obstructive Pulmonary Disease Exacerbation       Chronic obstructive pulmonary disease (COPD) is a long-term (chronic) condition that affects the lungs. COPD is a general term that can be used to describe many different lung problems that cause lung swelling (inflammation) and limit airflow, including chronic bronchitis and emphysema. COPD exacerbations are episodes when breathing symptoms become much worse and require extra treatment.    COPD exacerbations are usually caused by infections. Without treatment, COPD exacerbations can be severe and even life threatening. Frequent COPD exacerbations can cause further damage to the lungs.      What are the causes?  This condition may be caused by:  •Respiratory infections, including viral and bacterial infections.      •Exposure to smoke.      •Exposure to air pollution, chemical fumes, or dust.      •Things that give you an allergic reaction (allergens).      •Not taking your usual COPD medicines as directed.      •Underlying medical problems, such as congestive heart failure or infections not involving the lungs.      In many cases, the cause (trigger) of this condition is not known.      What increases the risk?  The following factors may make you more likely to develop this condition:  •Smoking cigarettes.      •Old age.      •Frequent prior COPD exacerbations.        What are the signs or symptoms?  Symptoms of this condition include:  •Increased coughing.      •Increased production of mucus from your lungs (sputum).      •Increased wheezing.      •Increased shortness of breath.      •Rapid or labored breathing.      •Chest tightness.      •Less energy than usual.      •Sleep disruption from symptoms.      •Confusion or increased sleepiness.      Often these symptoms happen or get worse even with the use of medicines.      How is this diagnosed?  This condition is diagnosed based on:  •Your medical history.      •A physical exam.    You may also have tests, including:  •A chest X-ray.      •Blood tests.      •Lung (pulmonary) function tests.        How is this treated?  Treatment for this condition depends on the severity and cause of the symptoms. You may need to be admitted to a hospital for treatment. Some of the treatments commonly used to treat COPD exacerbations are:  •Antibiotic medicines. These may be used for severe exacerbations caused by a lung infection, such as pneumonia.      •Bronchodilators. These are inhaled medicines that expand the air passages and allow increased airflow.      •Steroid medicines. These act to reduce inflammation in the airways. They may be given with an inhaler, taken by mouth, or given through an IV tube inserted into one of your veins.      •Supplemental oxygen therapy.      •Airway clearing techniques, such as noninvasive ventilation (NIV) and positive expiratory pressure (PEP). These provide respiratory support through a mask or other noninvasive device. An example of this would be using a continuous positive airway pressure (CPAP) machine to improve delivery of oxygen into your lungs.        Follow these instructions at home:    Medicines     •Take over-the-counter and prescription medicines only as told by your health care provider. It is important to use correct technique with inhaled medicines.      •If you were prescribed an antibiotic medicine or oral steroid, take it as told by your health care provider. Do not stop taking the medicine even if you start to feel better.      Lifestyle     •Eat a healthy diet.      •Exercise regularly.      •Get plenty of sleep.      •Avoid exposure to all substances that irritate the airway, especially to tobacco smoke.      •Wash your hands often with soap and water to reduce the risk of infection. If soap and water are not available, use hand .      •During flu season, avoid enclosed spaces that are crowded with people.      General instructions     •Drink enough fluid to keep your urine clear or pale yellow (unless you have a medical condition that requires fluid restriction).      •Use a cool mist vaporizer. This humidifies the air and makes it easier for you to clear your chest when you cough.      •If you have a home nebulizer and oxygen, continue to use them as told by your health care provider.      •Keep all follow-up visits as told by your health care provider. This is important.        How is this prevented?    •Stay up-to-date on pneumococcal and influenza (flu) vaccines. A flu shot is recommended every year to help prevent exacerbations.      • Do not use any products that contain nicotine or tobacco, such as cigarettes and e-cigarettes. Quitting smoking is very important in preventing COPD from getting worse and in preventing exacerbations from happening as often. If you need help quitting, ask your health care provider.      •Follow all instructions for pulmonary rehabilitation after a recent exacerbation. This can help prevent future exacerbations.      •Work with your health care provider to develop and follow an action plan. This tells you what steps to take when you experience certain symptoms.        Contact a health care provider if:    •You have a worsening of your regular COPD symptoms.        Get help right away if:    •You have worsening shortness of breath, even when resting.      •You have trouble talking.      •You have severe chest pain.      •You cough up blood.      •You have a fever.      •You have weakness, vomit repeatedly, or faint.      •You feel confused.      •You are not able to sleep because of your symptoms.      •You have trouble doing daily activities.        Summary    •COPD exacerbations are episodes when breathing symptoms become much worse and require extra treatment above your normal treatment.      •Exacerbations can be severe and even life threatening. Frequent COPD exacerbations can cause further damage to your lungs.      •COPD exacerbations are usually triggered by infections such as the flu, colds, and even pneumonia.      •Treatment for this condition depends on the severity and cause of the symptoms. You may need to be admitted to a hospital for treatment.      •Quitting smoking is very important to prevent COPD from getting worse and to prevent exacerbations from happening as often.      This information is not intended to replace advice given to you by your health care provider. Make sure you discuss any questions you have with your health care provider.      Document Revised: 11/30/2018 Document Reviewed: 01/22/2018    [employer] Patient Education © 2021 [employer]

## 2021-08-20 NOTE — ED PROVIDER NOTE - PATIENT PORTAL LINK FT
You can access the FollowMyHealth Patient Portal offered by [facility] by registering at the following website: [email or web address]. By joining [facility]’s FollowMyHealth portal, you will also be able to view your health information using other applications (apps) compatible with our system.

## 2021-08-20 NOTE — ED ADULT NURSE REASSESSMENT NOTE - NS ED NURSE REASSESS COMMENT FT1
received pt awake alert oriented x3 ,with o2 nasal cannula at 2lpm,waiting for ambulance for discharge.

## 2021-08-20 NOTE — ED PROVIDER NOTE - PROGRESS NOTE DETAILS
Pt improved and pulse ox in mid-90's on his usual 2.5 L NC oxygen, which he says he uses 24/7.  Prescribed prednisone burst.  Will go home to [facility] by ambulance.  Advised strict return precautions and PMD f/u.

## 2021-08-21 ENCOUNTER — INPATIENT (INPATIENT)
Facility: HOSPITAL | Age: 61
LOS: 3 days | Discharge: NURSING, REHAB OR LONG-TERM CARE | DRG: 291 | End: 2021-08-25
Attending: INTERNAL MEDICINE | Admitting: INTERNAL MEDICINE
Payer: MEDICAID

## 2021-08-21 VITALS
DIASTOLIC BLOOD PRESSURE: 98 MMHG | HEIGHT: 68 IN | TEMPERATURE: 98 F | OXYGEN SATURATION: 98 % | RESPIRATION RATE: 25 BRPM | SYSTOLIC BLOOD PRESSURE: 155 MMHG | WEIGHT: 220.02 LBS | HEART RATE: 112 BPM

## 2021-08-21 DIAGNOSIS — Z29.9 ENCOUNTER FOR PROPHYLACTIC MEASURES, UNSPECIFIED: ICD-10-CM

## 2021-08-21 DIAGNOSIS — J18.9 PNEUMONIA, UNSPECIFIED ORGANISM: ICD-10-CM

## 2021-08-21 DIAGNOSIS — E11.9 TYPE 2 DIABETES MELLITUS WITHOUT COMPLICATIONS: ICD-10-CM

## 2021-08-21 DIAGNOSIS — I10 ESSENTIAL (PRIMARY) HYPERTENSION: ICD-10-CM

## 2021-08-21 DIAGNOSIS — F31.9 BIPOLAR DISORDER, UNSPECIFIED: ICD-10-CM

## 2021-08-21 DIAGNOSIS — I50.9 HEART FAILURE, UNSPECIFIED: ICD-10-CM

## 2021-08-21 DIAGNOSIS — I50.23 ACUTE ON CHRONIC SYSTOLIC (CONGESTIVE) HEART FAILURE: ICD-10-CM

## 2021-08-21 DIAGNOSIS — I25.10 ATHEROSCLEROTIC HEART DISEASE OF NATIVE CORONARY ARTERY WITHOUT ANGINA PECTORIS: ICD-10-CM

## 2021-08-21 DIAGNOSIS — J44.1 CHRONIC OBSTRUCTIVE PULMONARY DISEASE WITH (ACUTE) EXACERBATION: ICD-10-CM

## 2021-08-21 LAB
ALBUMIN SERPL ELPH-MCNC: 2.8 G/DL — LOW (ref 3.5–5)
ALP SERPL-CCNC: 68 U/L — SIGNIFICANT CHANGE UP (ref 40–120)
ALT FLD-CCNC: 16 U/L DA — SIGNIFICANT CHANGE UP (ref 10–60)
ANION GAP SERPL CALC-SCNC: 1 MMOL/L — LOW (ref 5–17)
APPEARANCE UR: ABNORMAL
APTT BLD: 33.3 SEC — SIGNIFICANT CHANGE UP (ref 27.5–35.5)
AST SERPL-CCNC: 7 U/L — LOW (ref 10–40)
BASE EXCESS BLDA CALC-SCNC: 21.6 MMOL/L — HIGH (ref -2–3)
BASE EXCESS BLDV CALC-SCNC: 17.6 MMOL/L — SIGNIFICANT CHANGE UP
BASOPHILS # BLD AUTO: 0.02 K/UL — SIGNIFICANT CHANGE UP (ref 0–0.2)
BASOPHILS NFR BLD AUTO: 0.2 % — SIGNIFICANT CHANGE UP (ref 0–2)
BILIRUB SERPL-MCNC: 0.3 MG/DL — SIGNIFICANT CHANGE UP (ref 0.2–1.2)
BILIRUB UR-MCNC: NEGATIVE — SIGNIFICANT CHANGE UP
BLOOD GAS COMMENTS ARTERIAL: SIGNIFICANT CHANGE UP
BUN SERPL-MCNC: 15 MG/DL — SIGNIFICANT CHANGE UP (ref 7–18)
CALCIUM SERPL-MCNC: 8.7 MG/DL — SIGNIFICANT CHANGE UP (ref 8.4–10.5)
CHLORIDE SERPL-SCNC: 93 MMOL/L — LOW (ref 96–108)
CK SERPL-CCNC: 26 U/L — LOW (ref 35–232)
CO2 SERPL-SCNC: 44 MMOL/L — HIGH (ref 22–31)
COLOR SPEC: YELLOW — SIGNIFICANT CHANGE UP
CREAT SERPL-MCNC: 0.54 MG/DL — SIGNIFICANT CHANGE UP (ref 0.5–1.3)
DIFF PNL FLD: ABNORMAL
EOSINOPHIL # BLD AUTO: 0.02 K/UL — SIGNIFICANT CHANGE UP (ref 0–0.5)
EOSINOPHIL NFR BLD AUTO: 0.2 % — SIGNIFICANT CHANGE UP (ref 0–6)
GLUCOSE BLDC GLUCOMTR-MCNC: 250 MG/DL — HIGH (ref 70–99)
GLUCOSE SERPL-MCNC: 154 MG/DL — HIGH (ref 70–99)
GLUCOSE UR QL: NEGATIVE — SIGNIFICANT CHANGE UP
HCO3 BLDA-SCNC: 50 MMOL/L — CRITICAL HIGH (ref 21–28)
HCO3 BLDV-SCNC: 48 MMOL/L — CRITICAL HIGH (ref 22–29)
HCT VFR BLD CALC: 42.1 % — SIGNIFICANT CHANGE UP (ref 39–50)
HGB BLD-MCNC: 12.6 G/DL — LOW (ref 13–17)
HOROWITZ INDEX BLDA+IHG-RTO: 40 — SIGNIFICANT CHANGE UP
IMM GRANULOCYTES NFR BLD AUTO: 0.8 % — SIGNIFICANT CHANGE UP (ref 0–1.5)
INR BLD: 1.18 RATIO — HIGH (ref 0.88–1.16)
KETONES UR-MCNC: ABNORMAL
LACTATE SERPL-SCNC: 1.2 MMOL/L — SIGNIFICANT CHANGE UP (ref 0.7–2)
LEUKOCYTE ESTERASE UR-ACNC: ABNORMAL
LYMPHOCYTES # BLD AUTO: 1.32 K/UL — SIGNIFICANT CHANGE UP (ref 1–3.3)
LYMPHOCYTES # BLD AUTO: 13 % — SIGNIFICANT CHANGE UP (ref 13–44)
MCHC RBC-ENTMCNC: 28 PG — SIGNIFICANT CHANGE UP (ref 27–34)
MCHC RBC-ENTMCNC: 29.9 GM/DL — LOW (ref 32–36)
MCV RBC AUTO: 93.6 FL — SIGNIFICANT CHANGE UP (ref 80–100)
MONOCYTES # BLD AUTO: 0.74 K/UL — SIGNIFICANT CHANGE UP (ref 0–0.9)
MONOCYTES NFR BLD AUTO: 7.3 % — SIGNIFICANT CHANGE UP (ref 2–14)
NEUTROPHILS # BLD AUTO: 8 K/UL — HIGH (ref 1.8–7.4)
NEUTROPHILS NFR BLD AUTO: 78.5 % — HIGH (ref 43–77)
NITRITE UR-MCNC: NEGATIVE — SIGNIFICANT CHANGE UP
NRBC # BLD: 0 /100 WBCS — SIGNIFICANT CHANGE UP (ref 0–0)
NT-PROBNP SERPL-SCNC: 382 PG/ML — HIGH (ref 0–125)
PCO2 BLDA: 70 MMHG — CRITICAL HIGH (ref 35–48)
PCO2 BLDV: 92 MMHG — HIGH (ref 42–55)
PH BLDA: 7.46 — HIGH (ref 7.35–7.45)
PH BLDV: 7.33 — SIGNIFICANT CHANGE UP (ref 7.32–7.43)
PH UR: 7 — SIGNIFICANT CHANGE UP (ref 5–8)
PHOSPHATE SERPL-MCNC: 3.6 MG/DL — SIGNIFICANT CHANGE UP (ref 2.5–4.5)
PLATELET # BLD AUTO: 228 K/UL — SIGNIFICANT CHANGE UP (ref 150–400)
PO2 BLDA: 114 MMHG — HIGH (ref 83–108)
PO2 BLDV: 72 MMHG — SIGNIFICANT CHANGE UP
POTASSIUM SERPL-MCNC: 4.3 MMOL/L — SIGNIFICANT CHANGE UP (ref 3.5–5.3)
POTASSIUM SERPL-SCNC: 4.3 MMOL/L — SIGNIFICANT CHANGE UP (ref 3.5–5.3)
PROT SERPL-MCNC: 6.1 G/DL — SIGNIFICANT CHANGE UP (ref 6–8.3)
PROT UR-MCNC: 30 MG/DL
PROTHROM AB SERPL-ACNC: 13.9 SEC — HIGH (ref 10.6–13.6)
RBC # BLD: 4.5 M/UL — SIGNIFICANT CHANGE UP (ref 4.2–5.8)
RBC # FLD: 14.2 % — SIGNIFICANT CHANGE UP (ref 10.3–14.5)
SAO2 % BLDA: 98 % — SIGNIFICANT CHANGE UP
SAO2 % BLDV: 93.3 % — SIGNIFICANT CHANGE UP
SARS-COV-2 RNA SPEC QL NAA+PROBE: SIGNIFICANT CHANGE UP
SODIUM SERPL-SCNC: 138 MMOL/L — SIGNIFICANT CHANGE UP (ref 135–145)
SP GR SPEC: 1 — LOW (ref 1.01–1.02)
TROPONIN I SERPL-MCNC: 0.02 NG/ML — SIGNIFICANT CHANGE UP (ref 0–0.04)
UROBILINOGEN FLD QL: NEGATIVE — SIGNIFICANT CHANGE UP
WBC # BLD: 10.18 K/UL — SIGNIFICANT CHANGE UP (ref 3.8–10.5)
WBC # FLD AUTO: 10.18 K/UL — SIGNIFICANT CHANGE UP (ref 3.8–10.5)

## 2021-08-21 PROCEDURE — 99291 CRITICAL CARE FIRST HOUR: CPT

## 2021-08-21 PROCEDURE — 71045 X-RAY EXAM CHEST 1 VIEW: CPT | Mod: 26

## 2021-08-21 PROCEDURE — 93010 ELECTROCARDIOGRAM REPORT: CPT

## 2021-08-21 RX ORDER — CEFTRIAXONE 500 MG/1
1000 INJECTION, POWDER, FOR SOLUTION INTRAMUSCULAR; INTRAVENOUS EVERY 24 HOURS
Refills: 0 | Status: DISCONTINUED | OUTPATIENT
Start: 2021-08-22 | End: 2021-08-23

## 2021-08-21 RX ORDER — INSULIN LISPRO 100/ML
VIAL (ML) SUBCUTANEOUS
Refills: 0 | Status: DISCONTINUED | OUTPATIENT
Start: 2021-08-21 | End: 2021-08-25

## 2021-08-21 RX ORDER — AZITHROMYCIN 500 MG/1
500 TABLET, FILM COATED ORAL EVERY 24 HOURS
Refills: 0 | Status: DISCONTINUED | OUTPATIENT
Start: 2021-08-21 | End: 2021-08-23

## 2021-08-21 RX ORDER — ENOXAPARIN SODIUM 100 MG/ML
40 INJECTION SUBCUTANEOUS DAILY
Refills: 0 | Status: DISCONTINUED | OUTPATIENT
Start: 2021-08-21 | End: 2021-08-25

## 2021-08-21 RX ORDER — ALPRAZOLAM 0.25 MG
0.5 TABLET ORAL
Refills: 0 | Status: DISCONTINUED | OUTPATIENT
Start: 2021-08-21 | End: 2021-08-25

## 2021-08-21 RX ORDER — ALBUTEROL 90 UG/1
2 AEROSOL, METERED ORAL EVERY 6 HOURS
Refills: 0 | Status: DISCONTINUED | OUTPATIENT
Start: 2021-08-21 | End: 2021-08-25

## 2021-08-21 RX ORDER — CEFTRIAXONE 500 MG/1
1000 INJECTION, POWDER, FOR SOLUTION INTRAMUSCULAR; INTRAVENOUS ONCE
Refills: 0 | Status: COMPLETED | OUTPATIENT
Start: 2021-08-21 | End: 2021-08-21

## 2021-08-21 RX ORDER — PANTOPRAZOLE SODIUM 20 MG/1
40 TABLET, DELAYED RELEASE ORAL
Refills: 0 | Status: DISCONTINUED | OUTPATIENT
Start: 2021-08-21 | End: 2021-08-25

## 2021-08-21 RX ORDER — NICOTINE POLACRILEX 2 MG
1 GUM BUCCAL DAILY
Refills: 0 | Status: DISCONTINUED | OUTPATIENT
Start: 2021-08-21 | End: 2021-08-25

## 2021-08-21 RX ORDER — ALBUTEROL 90 UG/1
2.5 AEROSOL, METERED ORAL ONCE
Refills: 0 | Status: COMPLETED | OUTPATIENT
Start: 2021-08-21 | End: 2021-08-21

## 2021-08-21 RX ORDER — OXYCODONE AND ACETAMINOPHEN 5; 325 MG/1; MG/1
2 TABLET ORAL ONCE
Refills: 0 | Status: DISCONTINUED | OUTPATIENT
Start: 2021-08-21 | End: 2021-08-21

## 2021-08-21 RX ORDER — OXYCODONE AND ACETAMINOPHEN 5; 325 MG/1; MG/1
2 TABLET ORAL EVERY 6 HOURS
Refills: 0 | Status: DISCONTINUED | OUTPATIENT
Start: 2021-08-21 | End: 2021-08-25

## 2021-08-21 RX ORDER — CEFTRIAXONE 500 MG/1
INJECTION, POWDER, FOR SOLUTION INTRAMUSCULAR; INTRAVENOUS
Refills: 0 | Status: DISCONTINUED | OUTPATIENT
Start: 2021-08-21 | End: 2021-08-23

## 2021-08-21 RX ORDER — FUROSEMIDE 40 MG
20 TABLET ORAL ONCE
Refills: 0 | Status: COMPLETED | OUTPATIENT
Start: 2021-08-21 | End: 2021-08-21

## 2021-08-21 RX ORDER — ACETAMINOPHEN 500 MG
650 TABLET ORAL EVERY 6 HOURS
Refills: 0 | Status: DISCONTINUED | OUTPATIENT
Start: 2021-08-21 | End: 2021-08-25

## 2021-08-21 RX ORDER — ASPIRIN/CALCIUM CARB/MAGNESIUM 324 MG
81 TABLET ORAL DAILY
Refills: 0 | Status: DISCONTINUED | OUTPATIENT
Start: 2021-08-21 | End: 2021-08-25

## 2021-08-21 RX ORDER — LANOLIN ALCOHOL/MO/W.PET/CERES
3 CREAM (GRAM) TOPICAL AT BEDTIME
Refills: 0 | Status: DISCONTINUED | OUTPATIENT
Start: 2021-08-21 | End: 2021-08-25

## 2021-08-21 RX ORDER — INSULIN LISPRO 100/ML
VIAL (ML) SUBCUTANEOUS EVERY 6 HOURS
Refills: 0 | Status: DISCONTINUED | OUTPATIENT
Start: 2021-08-21 | End: 2021-08-21

## 2021-08-21 RX ORDER — TIOTROPIUM BROMIDE 18 UG/1
1 CAPSULE ORAL; RESPIRATORY (INHALATION) DAILY
Refills: 0 | Status: DISCONTINUED | OUTPATIENT
Start: 2021-08-21 | End: 2021-08-25

## 2021-08-21 RX ORDER — RISPERIDONE 4 MG/1
1 TABLET ORAL DAILY
Refills: 0 | Status: DISCONTINUED | OUTPATIENT
Start: 2021-08-21 | End: 2021-08-25

## 2021-08-21 RX ORDER — FUROSEMIDE 40 MG
40 TABLET ORAL DAILY
Refills: 0 | Status: DISCONTINUED | OUTPATIENT
Start: 2021-08-21 | End: 2021-08-21

## 2021-08-21 RX ORDER — MULTIVIT-MIN/FERROUS GLUCONATE 9 MG/15 ML
1 LIQUID (ML) ORAL DAILY
Refills: 0 | Status: DISCONTINUED | OUTPATIENT
Start: 2021-08-21 | End: 2021-08-25

## 2021-08-21 RX ORDER — SIMETHICONE 80 MG/1
80 TABLET, CHEWABLE ORAL DAILY
Refills: 0 | Status: DISCONTINUED | OUTPATIENT
Start: 2021-08-21 | End: 2021-08-25

## 2021-08-21 RX ORDER — TRAZODONE HCL 50 MG
150 TABLET ORAL AT BEDTIME
Refills: 0 | Status: DISCONTINUED | OUTPATIENT
Start: 2021-08-21 | End: 2021-08-25

## 2021-08-21 RX ORDER — POLYETHYLENE GLYCOL 3350 17 G/17G
17 POWDER, FOR SOLUTION ORAL DAILY
Refills: 0 | Status: DISCONTINUED | OUTPATIENT
Start: 2021-08-21 | End: 2021-08-25

## 2021-08-21 RX ORDER — ALBUTEROL 90 UG/1
2 AEROSOL, METERED ORAL
Qty: 0 | Refills: 0 | DISCHARGE

## 2021-08-21 RX ORDER — FUROSEMIDE 40 MG
20 TABLET ORAL DAILY
Refills: 0 | Status: DISCONTINUED | OUTPATIENT
Start: 2021-08-22 | End: 2021-08-25

## 2021-08-21 RX ORDER — INSULIN LISPRO 100/ML
VIAL (ML) SUBCUTANEOUS AT BEDTIME
Refills: 0 | Status: DISCONTINUED | OUTPATIENT
Start: 2021-08-21 | End: 2021-08-25

## 2021-08-21 RX ORDER — SENNA PLUS 8.6 MG/1
2 TABLET ORAL AT BEDTIME
Refills: 0 | Status: DISCONTINUED | OUTPATIENT
Start: 2021-08-21 | End: 2021-08-25

## 2021-08-21 RX ORDER — BUDESONIDE AND FORMOTEROL FUMARATE DIHYDRATE 160; 4.5 UG/1; UG/1
2 AEROSOL RESPIRATORY (INHALATION)
Refills: 0 | Status: DISCONTINUED | OUTPATIENT
Start: 2021-08-21 | End: 2021-08-25

## 2021-08-21 RX ADMIN — BUDESONIDE AND FORMOTEROL FUMARATE DIHYDRATE 2 PUFF(S): 160; 4.5 AEROSOL RESPIRATORY (INHALATION) at 23:10

## 2021-08-21 RX ADMIN — Medication 3 MILLIGRAM(S): at 23:10

## 2021-08-21 RX ADMIN — Medication 0.5 MILLIGRAM(S): at 19:49

## 2021-08-21 RX ADMIN — Medication 1 DROP(S): at 19:49

## 2021-08-21 RX ADMIN — SENNA PLUS 2 TABLET(S): 8.6 TABLET ORAL at 23:09

## 2021-08-21 RX ADMIN — ALBUTEROL 2 PUFF(S): 90 AEROSOL, METERED ORAL at 23:13

## 2021-08-21 RX ADMIN — ALBUTEROL 2.5 MILLIGRAM(S): 90 AEROSOL, METERED ORAL at 14:58

## 2021-08-21 RX ADMIN — OXYCODONE AND ACETAMINOPHEN 2 TABLET(S): 5; 325 TABLET ORAL at 19:03

## 2021-08-21 RX ADMIN — Medication 20 MILLIGRAM(S): at 14:58

## 2021-08-21 RX ADMIN — Medication 125 MILLIGRAM(S): at 14:58

## 2021-08-21 RX ADMIN — Medication 150 MILLIGRAM(S): at 23:10

## 2021-08-21 RX ADMIN — RISPERIDONE 1 MILLIGRAM(S): 4 TABLET ORAL at 23:10

## 2021-08-21 RX ADMIN — AZITHROMYCIN 255 MILLIGRAM(S): 500 TABLET, FILM COATED ORAL at 17:29

## 2021-08-21 RX ADMIN — CEFTRIAXONE 100 MILLIGRAM(S): 500 INJECTION, POWDER, FOR SOLUTION INTRAMUSCULAR; INTRAVENOUS at 17:29

## 2021-08-21 RX ADMIN — OXYCODONE AND ACETAMINOPHEN 2 TABLET(S): 5; 325 TABLET ORAL at 14:57

## 2021-08-21 RX ADMIN — Medication 40 MILLIGRAM(S): at 23:10

## 2021-08-21 NOTE — H&P ADULT - PROBLEM SELECTOR PLAN 8
IMPROVE VTE Individual Risk Assessment  RISK                                                                Points  [  ] Previous VTE                                                  3  [  ] Thrombophilia                                               2  [  ] Lower limb paralysis                                      2        (unable to hold up >15 seconds)    [  ] Current Cancer                                              2         (within 6 months)  [  ] Immobilization > 24 hrs                                1  [  ] ICU/CCU stay > 24 hours                              1  [  ] Age > 60                                                      1  IMPROVE VTE Score ____1_____    PPI for GI prophylaxis  Lovenox for DVT PPX

## 2021-08-21 NOTE — H&P ADULT - PROBLEM SELECTOR PLAN 6
- Patient takes insulin at home  - Started on HSS  - Fingerstick before meals and at bedtime  - DASH diet with consistent carb  - [employer] -180  - A1c 6.5 on 7/2021

## 2021-08-21 NOTE — ED CLERICAL - CLERICAL COMMENTS
Assigned @ 19:30 Assigned @ 19:30: Pulled back bed, per 4Nor RN, pt was upgraded to Tele care Assigned @ 19:30: Pulled back bed, per 4Nor RN, pt was upgraded to Tele care; Reassigned @ 20:20

## 2021-08-21 NOTE — H&P ADULT - HISTORY OF PRESENT ILLNESS
60 yo M from [facility] has PMH of HTN, HLD, DM, CHF, CAD w stents COPD on 2.5L at home, pulmonary hypertension on BIPAP (12/5 35% non complaint) presented to the ED c/o SOB and leg swelling. Of note, patient was admitted to ICU for Acute on chronic hypercapnic respiratory failure requiring intubation for airway protection, pressors and antibiotics. As per patient, he is active smoker and understands that his multiple admissions are related to his smoking however not ready to stop. Patient denies any chest pain, palpitations, dizziness or other complaints.       GOC full code

## 2021-08-21 NOTE — H&P ADULT - NSHPPHYSICALEXAM_GEN_ALL_CORE
ICU Vital Signs Last 24 Hrs  T(C): 36.4 (21 Aug 2021 10:55), Max: 36.6 (20 Aug 2021 19:56)  T(F): 97.6 (21 Aug 2021 10:55), Max: 97.8 (20 Aug 2021 19:56)  HR: 103 (21 Aug 2021 15:22) (86 - 112)  BP: 110/75 (21 Aug 2021 15:22) (108/73 - 155/98)  RR: 22 (21 Aug 2021 15:22) (18 - 25)  SpO2: 100% (21 Aug 2021 15:22) (94% - 100%)    GENERAL: NAD, obese, on BIPAP sat 96%  HEAD:  Atraumatic, Normocephalic  EYES:  conjunctiva and sclera clear  NECK: Supple, No JVD, Normal thyroid  CHEST/LUNG: bilateral diffuse wheezing, decreased breath sounds   HEART: Regular rate and rhythm; No murmurs, rubs, or gallops  ABDOMEN: Soft, Nontender, Nondistended; Bowel sounds present, no pain or masses on palpation   NERVOUS SYSTEM:  Alert & Oriented X3  EXTREMITIES:  2+ Peripheral Pulses, No clubbing or cyanosis. Bilateral LE pitting edema +2  : Tejada place in ED   SKIN: warm, dry

## 2021-08-21 NOTE — H&P ADULT - PROBLEM SELECTOR PLAN 3
- Patient presented w/ worsening sob, wheezing and cough most likely COPD with superimposed RML PNA  - Chest X ray Showed bilateral infiltrates and RML consolidation  - Oxygen supplementation as needed to maintain SpO2 of 88-92%  - S/p Levaquin 1 dose of in ED  - Will start on Rocephin and Zithromax   - Aspiration Precautions   - Supportive care and anti-tussives  - F/u Procalcitonin, Legionella Ag, strept Ag and Mycoplasma, RVP, MRSA PCR, B Cx  - Monitor for any signs of hemodynamic instability - Patient has hx of CHF on Lasix at home  - Chest X ray Showed bilateral infiltrates and RML consolidation, ABG showed 7.46/70/50/98  - Pro , Troponin x1 negative   - EKG IRBBB, sinus tachycardia   - Last Echo showed EF >55%, moderate pulmonary hypertension   - C/w home meds   - Started on IV Lasix, ASA and Statin  - Consider starting ACEI or ARB as tolerated   - Strict I & O, Daily weight, Restrict fluids to 1 L  - Tele monitor  - Cardio Dr Mendes consulted - Patient has hx of CHF on Lasix at home  - Chest X ray Showed bilateral infiltrates and RML consolidation, ABG showed 7.46/70/50/98  - Pro , Troponin x1 negative   - EKG IRBBB, sinus tachycardia   - Last Echo showed EF >55%, moderate pulmonary hypertension   - C/w home meds   - Started on IV Lasix 20 mg qd, ASA   - Consider starting ACEI or ARB as tolerated   - Strict I & O, Daily weight, Restrict fluids to 1 L  - Tele monitor  - Cardio Dr Mendes consulted

## 2021-08-21 NOTE — H&P ADULT - ASSESSMENT
1. Trace mitral regurgitation.  2. Normal left ventricular internal dimensions and wall  thicknesses.  3. Endocardium not well visualized; grossly normal left  ventricular systolic function based on limited views.  Segmental wall motion could not be assessed.  4. Right ventricular enlargement with normal RV systolic  function.  5. RV systolic pressure is 59 mm Hg. Moderate pulmonary  hypertension.   60 yo M from [facility] has PMH of HTN, HLD, DM, CHF, CAD w stents COPD on 2.5L at home, pulmonary hypertension on BIPAP (12/5 35% non complaint) presented to the ED c/o SOB and leg swelling. Patient admitted for COPD exacerbation, PNA and concern for CHF exacerbation

## 2021-08-21 NOTE — H&P ADULT - PROBLEM SELECTOR PLAN 5
- Patient has history of Hypertension on Lasix at home   - Started on Lasix 40 mg daily - adjust based on clinical status   - DASH diet  - Monitor BP and adjust meds as needed - Patient has history of Hypertension on Lasix at home   - Started on Lasix 20 mg daily - adjust based on clinical status   - DASH diet  - Monitor BP and adjust meds as needed

## 2021-08-21 NOTE — H&P ADULT - PROBLEM SELECTOR PLAN 1
- Patient with hx of COPD admitted for COPD exacerbation (increased cough, increased sob)  - On exam, patient is tachypneic, tachycardic, on BIPAP, bilateral wheezing with decreased breath sounds    - Chest X ray Showed bilateral infiltrates and RML consolidation, ABG showed 7.46/70/50/98  - S/p Solumedrol 125 mg IV x 1, Duoneb, Levaquin in ED  - Started on Zithromax for 3-5 days  - Albuterol 2 puffs q4hrs PRN  - Started on Spiriva qd and Symbicort   - C/w Solumedrol 40 mg qd TID, deescalate as tolerated   - Oxygen supplementation as needed to maintain SpO2 of 88-92%  - F/u sputum cx, Bcx, RVP panel  - Pulm Dr Marquez consulted - Patient with hx of COPD admitted for COPD exacerbation (increased cough, increased sob) most likely from non compliance and active smoking   - On exam, patient is tachypneic, tachycardic, on BIPAP, bilateral wheezing with decreased breath sounds    - Chest X ray Showed bilateral infiltrates and RML consolidation, ABG showed 7.46/70/50/98  - S/p Solumedrol 125 mg IV x 1, Duoneb, Levaquin in ED  - Started on Zithromax for 3-5 days  - Albuterol 2 puffs q4hrs PRN  - Started on Spiriva qd and Symbicort   - C/w Solumedrol 40 mg qd TID, deescalate as tolerated   - Oxygen supplementation as needed to maintain SpO2 of 88-92%  - F/u sputum cx, Bcx, RVP panel  - Pulm Dr Marquez consulted

## 2021-08-21 NOTE — ED PROVIDER NOTE - CARE PLAN
Principal Discharge DX:	Pneumonia  Secondary Diagnosis:	Sepsis with acute hypoxic respiratory failure without septic shock, due to unspecified organism  Secondary Diagnosis:	Acute hypercapnic respiratory failure   1

## 2021-08-21 NOTE — H&P ADULT - NSHPREVIEWOFSYSTEMS_GEN_ALL_CORE
REVIEW OF SYSTEMS:  CONSTITUTIONAL: No fever, weight loss, or fatigue  RESPIRATORY: No cough, wheezing, chills or hemoptysis; No shortness of breath  CARDIOVASCULAR: No chest pain, palpitations, dizziness, or leg swelling  GASTROINTESTINAL: No abdominal pain. No nausea, vomiting, or hematemesis; No diarrhea or constipation. No melena or hematochezia.  GENITOURINARY: No dysuria or hematuria, urinary frequency  NEUROLOGICAL: No headaches, memory loss, loss of strength, numbness, or tremors  SKIN: No itching, burning, rashes, or lesions REVIEW OF SYSTEMS:  CONSTITUTIONAL: No fever, weight loss, or fatigue  RESPIRATORY: cough+, wheezing+, no chills or hemoptysis; shortness of breath+  CARDIOVASCULAR: No chest pain, palpitations, dizziness, or leg swelling  GASTROINTESTINAL: No abdominal pain. No nausea, vomiting, or hematemesis; No diarrhea or constipation. No melena or hematochezia.  GENITOURINARY: No dysuria or hematuria, urinary frequency  NEUROLOGICAL: No headaches, memory loss, loss of strength, numbness, or tremors  SKIN: No itching, burning, rashes, or lesions

## 2021-08-21 NOTE — ED PROVIDER NOTE - SECONDARY DIAGNOSIS.
Acute hypercapnic respiratory failure Sepsis with acute hypoxic respiratory failure without septic shock, due to unspecified organism

## 2021-08-21 NOTE — ED PROVIDER NOTE - OBJECTIVE STATEMENT
61 year old male with PMHx of COPD (on 2.5 L oxygen at baseline), CAD, CHF, bipolar 1 disorder, diabetes, GERD, HLD, polyarthritis, and pulmonary HTN presents to the ED with complaints of shortness of breath since yesterday. Patient states that he visited this ED yesterday and was treated for a COPD exacerbation at the time, after which he was discharged back to [facility]. Patient reports that this morning he then developed some shortness of breath again, which was not relieved upon taking albuterol. EMS was then called, who found patient to have an oxygen saturation of 78% upon their arrival. Patient additionally notes some swelling to his legs. Of note, patient is currently on Lasix 20 mg 1x daily. Patient otherwise denies any chest pain and all other acute complaints. Patient endorses that he is an active smoker. NKDA. 61 year old male with PMHx of COPD (on 2.5 L oxygen at baseline), CAD, CHF, bipolar 1 disorder, diabetes, GERD, HLD, polyarthritis, and pulmonary HTN presents to the ED with complaints of shortness of breath since yesterday. Patient states that he visited this ED yesterday and was treated for a COPD exacerbation at the time, after which he was discharged back to [facility]. (the doctor wanted to admit him but patient did not want to stay) Patient reports that this morning he then developed some shortness of breath again, which was not relieved upon taking albuterol. EMS was then called, who found patient to have an oxygen saturation of 78% upon their arrival. Patient additionally notes some swelling to his legs. Of note, patient is currently on Lasix 20 mg 1x daily. Patient otherwise denies any chest pain and all other acute complaints. Patient endorses that he is an active smoker. NKDA.

## 2021-08-21 NOTE — CHART NOTE - NSCHARTNOTEFT_GEN_A_CORE
EVENT: Blood Gas Profile - Arterial in AM (08.21.21 @ 16:13)  pH, Arterial: 7.46  pCO2, Arterial: 70  pO2, Arterial: 114 mmHg  HCO3, Arterial: 50: Base Excess, Arterial: 21.6 mmol/L  Oxygen Saturation, Arterial: 98 %  FIO2, Arterial: 40  Blood Gas Comments Arterial: BiPAP 10/5/40%; RIGHT BRACHIAL    BRIEF HPI: 60 yo M from [facility] has PMH of HTN, HLD, DM, CHF, CAD w stents COPD on 2.5L at home, pulmonary hypertension on BIPAP (12/5 35% non complaint) presented to the ED c/o SOB and leg swelling. Of note, patient was admitted to ICU for Acute on chronic hypercapnic respiratory failure requiring intubation for airway protection, pressors and antibiotics. As per patient, he is active smoker and understands that his multiple admissions are related to his smoking however not ready to stop. Now on NC 2L.    Vital Signs Last 24 Hrs  T(C): 36.4 (21 Aug 2021 19:52), Max: 36.4 (21 Aug 2021 10:55)  T(F): 97.5 (21 Aug 2021 19:52), Max: 97.6 (21 Aug 2021 10:55)  HR: 103 (21 Aug 2021 19:52) (86 - 112)  BP: 116/66 (21 Aug 2021 19:52) (107/70 - 155/98)  BP(mean): --  RR: 20 (21 Aug 2021 19:52) (20 - 25)  SpO2: 94% (21 Aug 2021 19:52) (94% - 100%)    PROBLEM: Hypercarbia due to COPD  PLAN  1. Resume BiPAP and repeat ABG in 1 hr at 10 pm

## 2021-08-21 NOTE — ED PROVIDER NOTE - MUSCULOSKELETAL, MLM
Spine appears normal, range of motion is not limited, no muscle or joint tenderness. 2+ pitting edema to the bilateral lower extremities.

## 2021-08-21 NOTE — ED PROVIDER NOTE - PROGRESS NOTE DETAILS
Patient placed on bipap due to hypercapnia and pursed lip breathing. Patient then pulled off the bipap, with sat decrease to mid 80s. I convinced patient to keep it on. will admit for respiratory failure. noted bibasilar infiltrates, will treat for pneumonia. pro-bnp normal. Admit for pulmonary support. Patient also notes his bipap at home is not working, so he did not use it last night.

## 2021-08-21 NOTE — ED PROVIDER NOTE - CLINICAL SUMMARY MEDICAL DECISION MAKING FREE TEXT BOX
Patient with shortness of breath and hypoxia in the field. Will check labs, perform chest x-ray, and will reassess. Patient likely to be admitted.

## 2021-08-21 NOTE — H&P ADULT - PROBLEM SELECTOR PLAN 2
- Patient has hx of CHF on Lasix at home  - Chest X ray Showed bilateral infiltrates and RML consolidation, ABG showed 7.46/70/50/98  - Pro , Troponin x1 negative   - EKG IRBBB, sinus tachycardia   - Last Echo showed   - C/w home meds   - Started on IV Lasix, ASA and Statin  - Consider starting ACEI or ARB as tolerated   - Hold  - Strict I & O, Daily weight, Restrict fluids to 1 L  - Tele monitor  - Cardio Dr hooper - Patient presented w/ worsening sob, wheezing and cough most likely COPD with superimposed RML PNA  - Chest X ray Showed bilateral infiltrates and RML consolidation  - Oxygen supplementation as needed to maintain SpO2 of 88-92%  - S/p Levaquin 1 dose of in ED  - Will start on Rocephin and Zithromax   - Aspiration Precautions   - Supportive care and anti-tussives  - F/u Procalcitonin, Legionella Ag, strept Ag and Mycoplasma, RVP, MRSA PCR, B Cx  - Monitor for any signs of hemodynamic instability  - Pulm Dr Marquez consulted

## 2021-08-21 NOTE — CONSULT NOTE ADULT - SUBJECTIVE AND OBJECTIVE BOX
Time of visit:    CHIEF COMPLAINT: Patient is a 61y old  Male who presents with a chief complaint of COPD EXACERBATION AND PNA (21 Aug 2021 15:40)      HPI:  60 yo M from [facility] has PMH of HTN, HLD, DM, CHF, CAD w stents COPD on 2.5L at home, pulmonary hypertension on BIPAP ( 35% non complaint) presented to the ED c/o SOB and leg swelling. Of note, patient was admitted to ICU for Acute on chronic hypercapnic respiratory failure requiring intubation for airway protection, pressors and antibiotics. As per patient, he is active smoker and understands that his multiple admissions are related to his smoking however not ready to stop. Patient denies any chest pain, palpitations, dizziness or other complaints.       [facility] full code    (21 Aug 2021 15:40)   Patient seen and examined.     PAST MEDICAL & SURGICAL HISTORY:  COPD (chronic obstructive pulmonary disease)  Oxygen 2.5L at home    Stented coronary artery  2017    HLD (hyperlipidemia)    Pulmonary HTN    Type 2 diabetes mellitus without complication, with long-term current use of insulin    Coronary artery disease involving native coronary artery of native heart without angina pectoris    Bipolar 1 disorder    Smoker    Gastroesophageal reflux disease, esophagitis presence not specified    Oxygen dependent    DM (diabetes mellitus)    CAD (coronary artery disease)    GERD (gastroesophageal reflux disease)    Insomnia    Polyarthritis    No significant past surgical history        Allergies    No Known Allergies    Intolerances        MEDICATIONS  (STANDING):  ALPRAZolam 0.5 milliGRAM(s) Oral two times a day  artificial tears (preservative free) Ophthalmic Solution 1 Drop(s) Both EYES two times a day  aspirin  chewable 81 milliGRAM(s) Oral daily  azithromycin  IVPB 500 milliGRAM(s) IV Intermittent every 24 hours  budesonide 160 MICROgram(s)/formoterol 4.5 MICROgram(s) Inhaler 2 Puff(s) Inhalation two times a day  cefTRIAXone   IVPB      enoxaparin Injectable 40 milliGRAM(s) SubCutaneous daily  insulin lispro (ADMELOG) corrective regimen sliding scale   SubCutaneous three times a day before meals  insulin lispro (ADMELOG) corrective regimen sliding scale   SubCutaneous at bedtime  melatonin 3 milliGRAM(s) Oral at bedtime  methylPREDNISolone sodium succinate Injectable 40 milliGRAM(s) IV Push every 8 hours  multivitamin/minerals 1 Tablet(s) Oral daily  nicotine - 21 mG/24Hr(s) Patch 1 patch Transdermal daily  pantoprazole    Tablet 40 milliGRAM(s) Oral before breakfast  polyethylene glycol 3350 17 Gram(s) Oral daily  risperiDONE   Tablet 1 milliGRAM(s) Oral daily  senna 2 Tablet(s) Oral at bedtime  simethicone 80 milliGRAM(s) Chew daily  tiotropium 18 MICROgram(s) Capsule 1 Capsule(s) Inhalation daily  traZODone 150 milliGRAM(s) Oral at bedtime      MEDICATIONS  (PRN):  acetaminophen   Tablet .. 650 milliGRAM(s) Oral every 6 hours PRN Temp greater or equal to 38C (100.4F), Mild Pain (1 - 3)  ALBUTerol    90 MICROgram(s) HFA Inhaler 2 Puff(s) Inhalation every 6 hours PRN Shortness of Breath and/or Wheezing  guaiFENesin Oral Liquid (Sugar-Free) 200 milliGRAM(s) Oral every 6 hours PRN Cough  oxycodone    5 mG/acetaminophen 325 mG 2 Tablet(s) Oral every 6 hours PRN Severe Pain (7 - 10)   Medications up to date at time of exam.    Medications up to date at time of exam.    FAMILY HISTORY:  No family history of cardiovascular disease    No family history of chronic obstructive pulmonary disease    No family history of hypertension    No family history of mental disorder        SOCIAL HISTORY  Smoking History: [   ] smoking/smoke exposure, [   ] former smoker  Living Condition: [   ] apartment, [   ] private house  Work History:   Travel History: denies recent travel  Illicit Substance Use: denies  Alcohol Use: denies    REVIEW OF SYSTEMS:    CONSTITUTIONAL:  denies fevers, chills, sweats, weight loss    HEENT:  denies diplopia or blurred vision, sore throat or runny nose.    CARDIOVASCULAR:  denies pressure, squeezing, tightness, or heaviness about the chest; no palpitations.    RESPIRATORY:  denies SOB, cough, GIORDANO, wheezing.    GASTROINTESTINAL:  denies abdominal pain, nausea, vomiting or diarrhea.    GENITOURINARY: denies dysuria, frequency or urgency.    NEUROLOGIC:  denies numbness, tingling, seizures or weakness.    PSYCHIATRIC:  denies disorder of thought or mood.    MSK: denies swelling, redness      PHYSICAL EXAMINATION:    GENERAL: The patient is a well-developed, well-nourished, in no apparent distress.     Vital Signs Last 24 Hrs  T(C): 36.4 (21 Aug 2021 10:55), Max: 36.6 (20 Aug 2021 19:56)  T(F): 97.6 (21 Aug 2021 10:55), Max: 97.8 (20 Aug 2021 19:56)  HR: 103 (21 Aug 2021 16:00) (86 - 112)  BP: 110/75 (21 Aug 2021 15:22) (108/73 - 155/98)  BP(mean): --  RR: 22 (21 Aug 2021 15:22) (18 - 25)  SpO2: 100% (21 Aug 2021 16:00) (95% - 100%)   (if applicable)    Chest Tube (if applicable)    HEENT: Head is normocephalic and atraumatic. Extraocular muscles are intact. Mucous membranes are moist.     NECK: Supple, no palpable adenopathy.    LUNGS: Clear to auscultation, no wheezing, rales, or rhonchi.    HEART: Regular rate and rhythm without murmur.    ABDOMEN: Soft, nontender, and nondistended.  No hepatosplenomegaly is noted.    RENAL: No difficulty voiding, no pelvic pain    EXTREMITIES: Without any cyanosis, clubbing, rash, lesions or edema.    NEUROLOGIC: Awake, alert, oriented, grossly intact    SKIN: Warm, dry, good turgor.      LABS:                        12.6   10.18 )-----------( 228      ( 21 Aug 2021 12:15 )             42.1     08-21    138  |  93<L>  |  15  ----------------------------<  154<H>  4.3   |  44<H>  |  0.54    Ca    8.7      21 Aug 2021 12:15    TPro  6.1  /  Alb  2.8<L>  /  TBili  0.3  /  DBili  x   /  AST  7<L>  /  ALT  16  /  AlkPhos  68  08-21    PT/INR - ( 21 Aug 2021 12:15 )   PT: 13.9 sec;   INR: 1.18 ratio         PTT - ( 21 Aug 2021 12:15 )  PTT:33.3 sec  Urinalysis Basic - ( 21 Aug 2021 16:37 )    Color: Yellow / Appearance: very cloudy / S.005 / pH: x  Gluc: x / Ketone: Trace  / Bili: Negative / Urobili: Negative   Blood: x / Protein: 30 mg/dL / Nitrite: Negative   Leuk Esterase: Moderate / RBC: 2-5 /HPF / WBC 26-50 /HPF   Sq Epi: x / Non Sq Epi: Few /HPF / Bacteria: Many /HPF      ABG - ( 21 Aug 2021 16:13 )  pH, Arterial: 7.46  pH, Blood: x     /  pCO2: 70    /  pO2: 114   / HCO3: 50    / Base Excess: 21.6  /  SaO2: 98                CARDIAC MARKERS ( 21 Aug 2021 13:11 )  0.017 ng/mL / x     / 26 U/L / x     / x            Serum Pro-Brain Natriuretic Peptide: 382 pg/mL (21 @ 13:11)    Lactate, Blood: 1.2 mmol/L (21 @ 12:13)        MICROBIOLOGY: (if applicable)    RADIOLOGY & ADDITIONAL STUDIES:  EKG:   CXR:< from: Xray Chest 1 View-PORTABLE IMMEDIATE (21 @ 11:57) >    EXAM:  XR CHEST AP OR PA 1V                          EXAM:  XR CHEST PORTABLE IMMED 1V                            PROCEDURE DATE:  2021          INTERPRETATION:  AP chest on 2021 at 5:17 PM. Patient is short of breath and has history of COPD.    Heart is likely enlarged.    Rather prominent central pulmonary arteries suggest pulmonary hypertension.    Present film shows mild basal infiltrates new since 2021.    Follow-up AP chest, semierect, on 2021 at 12:00PM.    Chest unchanged.    IMPRESSION: Prominent emre suggesting pulmonary hypertension and bibasilar infiltrates are noted.    --- End of Report ---            CHAU GARCIA MD; Attending Radiologist  This document has been electronically signed. Aug 21 2021 11:59AM    < end of copied text >    ECHO:    IMPRESSION: 61y Male PAST MEDICAL & SURGICAL HISTORY:  COPD (chronic obstructive pulmonary disease)  Oxygen 2.5L at home    Stented coronary artery  2017    HLD (hyperlipidemia)    Pulmonary HTN    Type 2 diabetes mellitus without complication, with long-term current use of insulin    Coronary artery disease involving native coronary artery of native heart without angina pectoris    Bipolar 1 disorder    Smoker    Gastroesophageal reflux disease, esophagitis presence not specified    Oxygen dependent    DM (diabetes mellitus)    CAD (coronary artery disease)    GERD (gastroesophageal reflux disease)    Insomnia    Polyarthritis    No significant past surgical history     p/w                   RECOMMENDATIONS:   Time of visit:    CHIEF COMPLAINT: Patient is a 61y old  Male who presents with a chief complaint of COPD EXACERBATION AND PNA (21 Aug 2021 15:40)      HPI:  62 yo M from [facility] has PMH of HTN, HLD, DM, CHF, CAD w stents COPD on 2.5L at home, pulmonary hypertension on BIPAP ( 35% non complaint) presented to the ED c/o SOB and leg swelling. Of note, patient was admitted to ICU for Acute on chronic hypercapnic respiratory failure requiring intubation for airway protection, pressors and antibiotics. As per patient, he is active smoker and understands that his multiple admissions are related to his smoking however not ready to stop. Patient denies any chest pain, palpitations, dizziness or other complaints.       [facility] full code    (21 Aug 2021 15:40)   Patient seen and examined.     PAST MEDICAL & SURGICAL HISTORY:  COPD (chronic obstructive pulmonary disease)  Oxygen 2.5L at home    Stented coronary artery  2017    HLD (hyperlipidemia)    Pulmonary HTN    Type 2 diabetes mellitus without complication, with long-term current use of insulin    Coronary artery disease involving native coronary artery of native heart without angina pectoris    Bipolar 1 disorder    Smoker    Gastroesophageal reflux disease, esophagitis presence not specified    Oxygen dependent    DM (diabetes mellitus)    CAD (coronary artery disease)    GERD (gastroesophageal reflux disease)    Insomnia    Polyarthritis    No significant past surgical history        Allergies    No Known Allergies    Intolerances        MEDICATIONS  (STANDING):  ALPRAZolam 0.5 milliGRAM(s) Oral two times a day  artificial tears (preservative free) Ophthalmic Solution 1 Drop(s) Both EYES two times a day  aspirin  chewable 81 milliGRAM(s) Oral daily  azithromycin  IVPB 500 milliGRAM(s) IV Intermittent every 24 hours  budesonide 160 MICROgram(s)/formoterol 4.5 MICROgram(s) Inhaler 2 Puff(s) Inhalation two times a day  cefTRIAXone   IVPB      enoxaparin Injectable 40 milliGRAM(s) SubCutaneous daily  insulin lispro (ADMELOG) corrective regimen sliding scale   SubCutaneous three times a day before meals  insulin lispro (ADMELOG) corrective regimen sliding scale   SubCutaneous at bedtime  melatonin 3 milliGRAM(s) Oral at bedtime  methylPREDNISolone sodium succinate Injectable 40 milliGRAM(s) IV Push every 8 hours  multivitamin/minerals 1 Tablet(s) Oral daily  nicotine - 21 mG/24Hr(s) Patch 1 patch Transdermal daily  pantoprazole    Tablet 40 milliGRAM(s) Oral before breakfast  polyethylene glycol 3350 17 Gram(s) Oral daily  risperiDONE   Tablet 1 milliGRAM(s) Oral daily  senna 2 Tablet(s) Oral at bedtime  simethicone 80 milliGRAM(s) Chew daily  tiotropium 18 MICROgram(s) Capsule 1 Capsule(s) Inhalation daily  traZODone 150 milliGRAM(s) Oral at bedtime      MEDICATIONS  (PRN):  acetaminophen   Tablet .. 650 milliGRAM(s) Oral every 6 hours PRN Temp greater or equal to 38C (100.4F), Mild Pain (1 - 3)  ALBUTerol    90 MICROgram(s) HFA Inhaler 2 Puff(s) Inhalation every 6 hours PRN Shortness of Breath and/or Wheezing  guaiFENesin Oral Liquid (Sugar-Free) 200 milliGRAM(s) Oral every 6 hours PRN Cough  oxycodone    5 mG/acetaminophen 325 mG 2 Tablet(s) Oral every 6 hours PRN Severe Pain (7 - 10)   Medications up to date at time of exam.    Medications up to date at time of exam.    FAMILY HISTORY:  No family history of cardiovascular disease    No family history of chronic obstructive pulmonary disease    No family history of hypertension    No family history of mental disorder        SOCIAL HISTORY  Smoking History: [ x  ] smoking/smoke exposure,  Living Condition: [   ] apartment, [   ] private house  Work History:   Travel History: denies recent travel  Illicit Substance Use: denies  Alcohol Use: denies    REVIEW OF SYSTEMS:    CONSTITUTIONAL:  denies fevers, chills, sweats, weight loss    HEENT:  denies diplopia or blurred vision, sore throat or runny nose.    CARDIOVASCULAR:  denies pressure, squeezing, tightness, or heaviness about the chest; no palpitations.    RESPIRATORY:  denies SOB, cough, GIORDANO, wheezing.    GASTROINTESTINAL:  denies abdominal pain, nausea, vomiting or diarrhea.    GENITOURINARY: denies dysuria, frequency or urgency.    NEUROLOGIC:  denies numbness, tingling, seizures or weakness.    PSYCHIATRIC:  denies disorder of thought or mood.    MSK: denies swelling, redness      PHYSICAL EXAMINATION:    GENERAL: The patient is a well-developed, well-nourished, in no apparent distress.     Vital Signs Last 24 Hrs  T(C): 36.4 (21 Aug 2021 10:55), Max: 36.6 (20 Aug 2021 19:56)  T(F): 97.6 (21 Aug 2021 10:55), Max: 97.8 (20 Aug 2021 19:56)  HR: 103 (21 Aug 2021 16:00) (86 - 112)  BP: 110/75 (21 Aug 2021 15:22) (108/73 - 155/98)  BP(mean): --  RR: 22 (21 Aug 2021 15:22) (18 - 25)  SpO2: 100% (21 Aug 2021 16:00) (95% - 100%)   (if applicable)    Chest Tube (if applicable)    HEENT: Head is normocephalic and atraumatic. Extraocular muscles are intact. Mucous membranes are moist.     NECK: Supple, no palpable adenopathy.    LUNGS: Clear to auscultation, no wheezing, rales, or rhonchi.    HEART: Regular rate and rhythm without murmur.    ABDOMEN: Soft, nontender, and nondistended.  No hepatosplenomegaly is noted.    RENAL: No difficulty voiding, no pelvic pain    EXTREMITIES: Without any cyanosis, clubbing, rash, lesions or edema. + peau d'orange      NEUROLOGIC: Awake, alert, oriented, grossly intact    SKIN: Warm, dry, good turgor.      LABS:                        12.6   10.18 )-----------( 228      ( 21 Aug 2021 12:15 )             42.1     08-    138  |  93<L>  |  15  ----------------------------<  154<H>  4.3   |  44<H>  |  0.54    Ca    8.7      21 Aug 2021 12:15    TPro  6.1  /  Alb  2.8<L>  /  TBili  0.3  /  DBili  x   /  AST  7<L>  /  ALT  16  /  AlkPhos  68  08-    PT/INR - ( 21 Aug 2021 12:15 )   PT: 13.9 sec;   INR: 1.18 ratio         PTT - ( 21 Aug 2021 12:15 )  PTT:33.3 sec  Urinalysis Basic - ( 21 Aug 2021 16:37 )    Color: Yellow / Appearance: very cloudy / S.005 / pH: x  Gluc: x / Ketone: Trace  / Bili: Negative / Urobili: Negative   Blood: x / Protein: 30 mg/dL / Nitrite: Negative   Leuk Esterase: Moderate / RBC: 2-5 /HPF / WBC 26-50 /HPF   Sq Epi: x / Non Sq Epi: Few /HPF / Bacteria: Many /HPF      ABG - ( 21 Aug 2021 16:13 )  pH, Arterial: 7.46  pH, Blood: x     /  pCO2: 70    /  pO2: 114   / HCO3: 50    / Base Excess: 21.6  /  SaO2: 98                CARDIAC MARKERS ( 21 Aug 2021 13:11 )  0.017 ng/mL / x     / 26 U/L / x     / x            Serum Pro-Brain Natriuretic Peptide: 382 pg/mL (21 @ 13:11)    Lactate, Blood: 1.2 mmol/L (21 @ 12:13)        MICROBIOLOGY: (if applicable)    RADIOLOGY & ADDITIONAL STUDIES:  EKG:   CXR:< from: Xray Chest 1 View-PORTABLE IMMEDIATE (21 @ 11:57) >    EXAM:  XR CHEST AP OR PA 1V                          EXAM:  XR CHEST PORTABLE IMMED 1V                            PROCEDURE DATE:  2021          INTERPRETATION:  AP chest on 2021 at 5:17 PM. Patient is short of breath and has history of COPD.    Heart is likely enlarged.    Rather prominent central pulmonary arteries suggest pulmonary hypertension.    Present film shows mild basal infiltrates new since 2021.    Follow-up AP chest, semierect, on 2021 at 12:00PM.    Chest unchanged.    IMPRESSION: Prominent emre suggesting pulmonary hypertension and bibasilar infiltrates are noted.    --- End of Report ---            CHAU GARCIA MD; Attending Radiologist  This document has been electronically signed. Aug 21 2021 11:59AM    < end of copied text >    ECHO:    IMPRESSION: 61y Male PAST MEDICAL & SURGICAL HISTORY:  COPD (chronic obstructive pulmonary disease)  Oxygen 2.5L at home    Stented coronary artery  2017    HLD (hyperlipidemia)    Pulmonary HTN    Type 2 diabetes mellitus without complication, with long-term current use of insulin    Coronary artery disease involving native coronary artery of native heart without angina pectoris    Bipolar 1 disorder    Smoker    Gastroesophageal reflux disease, esophagitis presence not specified    Oxygen dependent    DM (diabetes mellitus)    CAD (coronary artery disease)    GERD (gastroesophageal reflux disease)    Insomnia    Polyarthritis    No significant past surgical history     p/w       IMP: 62 yo M from [facility] active smoker with  HTN, HLD, DM, CHF, CAD w stents chronic hypoxic resp failure due to  COPD requiring O2 supp @ 2.5L  pulmonary hypertension on BIPAP ( 35% non complaint) presented to the ED c/o SOB and leg swelling. Pat admitted for acute on chronic hypoxic hypercapnic resp failure requiring BiPaP support due to CHF.  Time of visit:    CHIEF COMPLAINT: Patient is a 61y old  Male who presents with a chief complaint of COPD EXACERBATION AND PNA (21 Aug 2021 15:40)      HPI:  60 yo M from [facility] has PMH of HTN, HLD, DM, CHF, CAD w stents COPD on 2.5L at home, pulmonary hypertension on BIPAP ( 35% non complaint) presented to the ED c/o SOB and leg swelling. Of note, patient was admitted to ICU for Acute on chronic hypercapnic respiratory failure requiring intubation for airway protection, pressors and antibiotics. As per patient, he is active smoker and understands that his multiple admissions are related to his smoking however not ready to stop. Patient denies any chest pain, palpitations, dizziness or other complaints.       [facility] full code    (21 Aug 2021 15:40)   Patient seen and examined.     PAST MEDICAL & SURGICAL HISTORY:  COPD (chronic obstructive pulmonary disease)  Oxygen 2.5L at home    Stented coronary artery  2017    HLD (hyperlipidemia)    Pulmonary HTN    Type 2 diabetes mellitus without complication, with long-term current use of insulin    Coronary artery disease involving native coronary artery of native heart without angina pectoris    Bipolar 1 disorder    Smoker    Gastroesophageal reflux disease, esophagitis presence not specified    Oxygen dependent    DM (diabetes mellitus)    CAD (coronary artery disease)    GERD (gastroesophageal reflux disease)    Insomnia    Polyarthritis    No significant past surgical history        Allergies    No Known Allergies    Intolerances        MEDICATIONS  (STANDING):  ALPRAZolam 0.5 milliGRAM(s) Oral two times a day  artificial tears (preservative free) Ophthalmic Solution 1 Drop(s) Both EYES two times a day  aspirin  chewable 81 milliGRAM(s) Oral daily  azithromycin  IVPB 500 milliGRAM(s) IV Intermittent every 24 hours  budesonide 160 MICROgram(s)/formoterol 4.5 MICROgram(s) Inhaler 2 Puff(s) Inhalation two times a day  cefTRIAXone   IVPB      enoxaparin Injectable 40 milliGRAM(s) SubCutaneous daily  insulin lispro (ADMELOG) corrective regimen sliding scale   SubCutaneous three times a day before meals  insulin lispro (ADMELOG) corrective regimen sliding scale   SubCutaneous at bedtime  melatonin 3 milliGRAM(s) Oral at bedtime  methylPREDNISolone sodium succinate Injectable 40 milliGRAM(s) IV Push every 8 hours  multivitamin/minerals 1 Tablet(s) Oral daily  nicotine - 21 mG/24Hr(s) Patch 1 patch Transdermal daily  pantoprazole    Tablet 40 milliGRAM(s) Oral before breakfast  polyethylene glycol 3350 17 Gram(s) Oral daily  risperiDONE   Tablet 1 milliGRAM(s) Oral daily  senna 2 Tablet(s) Oral at bedtime  simethicone 80 milliGRAM(s) Chew daily  tiotropium 18 MICROgram(s) Capsule 1 Capsule(s) Inhalation daily  traZODone 150 milliGRAM(s) Oral at bedtime      MEDICATIONS  (PRN):  acetaminophen   Tablet .. 650 milliGRAM(s) Oral every 6 hours PRN Temp greater or equal to 38C (100.4F), Mild Pain (1 - 3)  ALBUTerol    90 MICROgram(s) HFA Inhaler 2 Puff(s) Inhalation every 6 hours PRN Shortness of Breath and/or Wheezing  guaiFENesin Oral Liquid (Sugar-Free) 200 milliGRAM(s) Oral every 6 hours PRN Cough  oxycodone    5 mG/acetaminophen 325 mG 2 Tablet(s) Oral every 6 hours PRN Severe Pain (7 - 10)   Medications up to date at time of exam.    Medications up to date at time of exam.    FAMILY HISTORY:  No family history of cardiovascular disease    No family history of chronic obstructive pulmonary disease    No family history of hypertension    No family history of mental disorder        SOCIAL HISTORY  Smoking History: [ x  ] smoking/smoke exposure,  Living Condition: [   ] apartment, [   ] private house  Work History:   Travel History: denies recent travel  Illicit Substance Use: denies  Alcohol Use: denies    REVIEW OF SYSTEMS:    CONSTITUTIONAL:  denies fevers, chills, sweats, weight loss    HEENT:  denies diplopia or blurred vision, sore throat or runny nose.    CARDIOVASCULAR:  denies pressure, squeezing, tightness, or heaviness about the chest; no palpitations.    RESPIRATORY:  denies SOB, cough, GIORDANO, wheezing.    GASTROINTESTINAL:  denies abdominal pain, nausea, vomiting or diarrhea.    GENITOURINARY: denies dysuria, frequency or urgency.    NEUROLOGIC:  denies numbness, tingling, seizures or weakness.    PSYCHIATRIC:  denies disorder of thought or mood.    MSK: denies swelling, redness      PHYSICAL EXAMINATION:    GENERAL: The patient is a well-developed, well-nourished, in no apparent distress.     Vital Signs Last 24 Hrs  T(C): 36.4 (21 Aug 2021 10:55), Max: 36.6 (20 Aug 2021 19:56)  T(F): 97.6 (21 Aug 2021 10:55), Max: 97.8 (20 Aug 2021 19:56)  HR: 103 (21 Aug 2021 16:00) (86 - 112)  BP: 110/75 (21 Aug 2021 15:22) (108/73 - 155/98)  BP(mean): --  RR: 22 (21 Aug 2021 15:22) (18 - 25)  SpO2: 100% (21 Aug 2021 16:00) (95% - 100%)   (if applicable)    Chest Tube (if applicable)    HEENT: Head is normocephalic and atraumatic. Extraocular muscles are intact. Mucous membranes are moist.     NECK: Supple, no palpable adenopathy.    LUNGS: Clear to auscultation, no wheezing, rales, or rhonchi.    HEART: Regular rate and rhythm without murmur.    ABDOMEN: Soft, nontender, and nondistended.  No hepatosplenomegaly is noted.    RENAL: No difficulty voiding, no pelvic pain    EXTREMITIES: Without any cyanosis, clubbing, rash, lesions or edema. + peau d'orange      NEUROLOGIC: Awake, alert, oriented, grossly intact    SKIN: Warm, dry, good turgor.      LABS:                        12.6   10.18 )-----------( 228      ( 21 Aug 2021 12:15 )             42.1     08-    138  |  93<L>  |  15  ----------------------------<  154<H>  4.3   |  44<H>  |  0.54    Ca    8.7      21 Aug 2021 12:15    TPro  6.1  /  Alb  2.8<L>  /  TBili  0.3  /  DBili  x   /  AST  7<L>  /  ALT  16  /  AlkPhos  68  08-    PT/INR - ( 21 Aug 2021 12:15 )   PT: 13.9 sec;   INR: 1.18 ratio         PTT - ( 21 Aug 2021 12:15 )  PTT:33.3 sec  Urinalysis Basic - ( 21 Aug 2021 16:37 )    Color: Yellow / Appearance: very cloudy / S.005 / pH: x  Gluc: x / Ketone: Trace  / Bili: Negative / Urobili: Negative   Blood: x / Protein: 30 mg/dL / Nitrite: Negative   Leuk Esterase: Moderate / RBC: 2-5 /HPF / WBC 26-50 /HPF   Sq Epi: x / Non Sq Epi: Few /HPF / Bacteria: Many /HPF      ABG - ( 21 Aug 2021 16:13 )  pH, Arterial: 7.46  pH, Blood: x     /  pCO2: 70    /  pO2: 114   / HCO3: 50    / Base Excess: 21.6  /  SaO2: 98                CARDIAC MARKERS ( 21 Aug 2021 13:11 )  0.017 ng/mL / x     / 26 U/L / x     / x            Serum Pro-Brain Natriuretic Peptide: 382 pg/mL (21 @ 13:11)    Lactate, Blood: 1.2 mmol/L (21 @ 12:13)        MICROBIOLOGY: (if applicable)    RADIOLOGY & ADDITIONAL STUDIES:  EKG:   CXR:< from: Xray Chest 1 View-PORTABLE IMMEDIATE (21 @ 11:57) >    EXAM:  XR CHEST AP OR PA 1V                          EXAM:  XR CHEST PORTABLE IMMED 1V                            PROCEDURE DATE:  2021          INTERPRETATION:  AP chest on 2021 at 5:17 PM. Patient is short of breath and has history of COPD.    Heart is likely enlarged.    Rather prominent central pulmonary arteries suggest pulmonary hypertension.    Present film shows mild basal infiltrates new since 2021.    Follow-up AP chest, semierect, on 2021 at 12:00PM.    Chest unchanged.    IMPRESSION: Prominent emre suggesting pulmonary hypertension and bibasilar infiltrates are noted.    --- End of Report ---            CHAU GARCIA MD; Attending Radiologist  This document has been electronically signed. Aug 21 2021 11:59AM    < end of copied text >    ECHO:    IMPRESSION: 61y Male PAST MEDICAL & SURGICAL HISTORY:  COPD (chronic obstructive pulmonary disease)  Oxygen 2.5L at home    Stented coronary artery  2017    HLD (hyperlipidemia)    Pulmonary HTN    Type 2 diabetes mellitus without complication, with long-term current use of insulin    Coronary artery disease involving native coronary artery of native heart without angina pectoris    Bipolar 1 disorder    Smoker    Gastroesophageal reflux disease, esophagitis presence not specified    Oxygen dependent    DM (diabetes mellitus)    CAD (coronary artery disease)    GERD (gastroesophageal reflux disease)    Insomnia    Polyarthritis    No significant past surgical history     p/w       IMP: 60 yo M from [facility] active smoker with  HTN, HLD, DM, CHF, CAD w stents chronic hypoxic resp failure due to  COPD requiring O2 supp @ 2.5L  pulmonary hypertension on BIPAP ( 35% non complaint) presented to the ED c/o SOB and leg swelling. Pat admitted for acute on chronic hypoxic hypercapnic resp failure requiring BiPaP support due to CHF.  Pat is afebrile, normal WBC and neg procalcitonin, CXR with b/l lower lungs infiltrate due to pulmonary edema . i doubt infectious processes         Sugg;  -continue BiPaP at night   -O2 supp to maintain sat >90%  -solumedrol 4o mg q8h  -albuterol inhaler   -consider d/c  antibx   -diuresis   -advise to stop smoking  -nicotine patch   -monitor blood sugar with coverage   -dvt/ gi prophy

## 2021-08-22 LAB
ANION GAP SERPL CALC-SCNC: 4 MMOL/L — LOW (ref 5–17)
BASE EXCESS BLDA CALC-SCNC: 16.4 MMOL/L — HIGH (ref -2–3)
BLOOD GAS COMMENTS ARTERIAL: SIGNIFICANT CHANGE UP
BUN SERPL-MCNC: 19 MG/DL — HIGH (ref 7–18)
CALCIUM SERPL-MCNC: 9 MG/DL — SIGNIFICANT CHANGE UP (ref 8.4–10.5)
CHLORIDE SERPL-SCNC: 89 MMOL/L — LOW (ref 96–108)
CO2 SERPL-SCNC: 41 MMOL/L — HIGH (ref 22–31)
COVID-19 SPIKE DOMAIN AB INTERP: POSITIVE
COVID-19 SPIKE DOMAIN ANTIBODY RESULT: >250 U/ML — HIGH
CREAT SERPL-MCNC: 0.62 MG/DL — SIGNIFICANT CHANGE UP (ref 0.5–1.3)
CULTURE RESULTS: SIGNIFICANT CHANGE UP
GLUCOSE BLDC GLUCOMTR-MCNC: 289 MG/DL — HIGH (ref 70–99)
GLUCOSE BLDC GLUCOMTR-MCNC: 291 MG/DL — HIGH (ref 70–99)
GLUCOSE BLDC GLUCOMTR-MCNC: 307 MG/DL — HIGH (ref 70–99)
GLUCOSE BLDC GLUCOMTR-MCNC: 356 MG/DL — HIGH (ref 70–99)
GLUCOSE SERPL-MCNC: 317 MG/DL — HIGH (ref 70–99)
HCO3 BLDA-SCNC: 44 MMOL/L — HIGH (ref 21–28)
HCT VFR BLD CALC: 40.6 % — SIGNIFICANT CHANGE UP (ref 39–50)
HGB BLD-MCNC: 12.1 G/DL — LOW (ref 13–17)
HOROWITZ INDEX BLDA+IHG-RTO: 30 — SIGNIFICANT CHANGE UP
MAGNESIUM SERPL-MCNC: 2.3 MG/DL — SIGNIFICANT CHANGE UP (ref 1.6–2.6)
MCHC RBC-ENTMCNC: 27.6 PG — SIGNIFICANT CHANGE UP (ref 27–34)
MCHC RBC-ENTMCNC: 29.8 GM/DL — LOW (ref 32–36)
MCV RBC AUTO: 92.5 FL — SIGNIFICANT CHANGE UP (ref 80–100)
MRSA PCR RESULT.: SIGNIFICANT CHANGE UP
NRBC # BLD: 0 /100 WBCS — SIGNIFICANT CHANGE UP (ref 0–0)
PCO2 BLDA: 64 MMHG — HIGH (ref 35–48)
PH BLDA: 7.44 — SIGNIFICANT CHANGE UP (ref 7.35–7.45)
PHOSPHATE SERPL-MCNC: 3.8 MG/DL — SIGNIFICANT CHANGE UP (ref 2.5–4.5)
PLATELET # BLD AUTO: 235 K/UL — SIGNIFICANT CHANGE UP (ref 150–400)
PO2 BLDA: 67 MMHG — LOW (ref 83–108)
POTASSIUM SERPL-MCNC: 4.6 MMOL/L — SIGNIFICANT CHANGE UP (ref 3.5–5.3)
POTASSIUM SERPL-SCNC: 4.6 MMOL/L — SIGNIFICANT CHANGE UP (ref 3.5–5.3)
PROCALCITONIN SERPL-MCNC: 0.07 NG/ML — SIGNIFICANT CHANGE UP (ref 0.02–0.1)
RBC # BLD: 4.39 M/UL — SIGNIFICANT CHANGE UP (ref 4.2–5.8)
RBC # FLD: 14.4 % — SIGNIFICANT CHANGE UP (ref 10.3–14.5)
S AUREUS DNA NOSE QL NAA+PROBE: SIGNIFICANT CHANGE UP
SAO2 % BLDA: 94 % — SIGNIFICANT CHANGE UP
SARS-COV-2 IGG+IGM SERPL QL IA: >250 U/ML — HIGH
SARS-COV-2 IGG+IGM SERPL QL IA: POSITIVE
SODIUM SERPL-SCNC: 134 MMOL/L — LOW (ref 135–145)
SPECIMEN SOURCE: SIGNIFICANT CHANGE UP
WBC # BLD: 8.98 K/UL — SIGNIFICANT CHANGE UP (ref 3.8–10.5)
WBC # FLD AUTO: 8.98 K/UL — SIGNIFICANT CHANGE UP (ref 3.8–10.5)

## 2021-08-22 RX ADMIN — Medication 40 MILLIGRAM(S): at 21:25

## 2021-08-22 RX ADMIN — OXYCODONE AND ACETAMINOPHEN 2 TABLET(S): 5; 325 TABLET ORAL at 21:26

## 2021-08-22 RX ADMIN — Medication 4: at 08:18

## 2021-08-22 RX ADMIN — Medication 0.5 MILLIGRAM(S): at 05:30

## 2021-08-22 RX ADMIN — POLYETHYLENE GLYCOL 3350 17 GRAM(S): 17 POWDER, FOR SOLUTION ORAL at 12:16

## 2021-08-22 RX ADMIN — Medication 40 MILLIGRAM(S): at 05:33

## 2021-08-22 RX ADMIN — BUDESONIDE AND FORMOTEROL FUMARATE DIHYDRATE 2 PUFF(S): 160; 4.5 AEROSOL RESPIRATORY (INHALATION) at 12:17

## 2021-08-22 RX ADMIN — Medication 1 PATCH: at 19:48

## 2021-08-22 RX ADMIN — OXYCODONE AND ACETAMINOPHEN 2 TABLET(S): 5; 325 TABLET ORAL at 09:10

## 2021-08-22 RX ADMIN — Medication 1 DROP(S): at 17:24

## 2021-08-22 RX ADMIN — Medication 0.5 MILLIGRAM(S): at 17:24

## 2021-08-22 RX ADMIN — Medication 40 MILLIGRAM(S): at 14:28

## 2021-08-22 RX ADMIN — Medication 1: at 20:49

## 2021-08-22 RX ADMIN — SIMETHICONE 80 MILLIGRAM(S): 80 TABLET, CHEWABLE ORAL at 12:16

## 2021-08-22 RX ADMIN — TIOTROPIUM BROMIDE 1 CAPSULE(S): 18 CAPSULE ORAL; RESPIRATORY (INHALATION) at 12:17

## 2021-08-22 RX ADMIN — Medication 81 MILLIGRAM(S): at 12:16

## 2021-08-22 RX ADMIN — Medication 5: at 12:16

## 2021-08-22 RX ADMIN — OXYCODONE AND ACETAMINOPHEN 2 TABLET(S): 5; 325 TABLET ORAL at 14:28

## 2021-08-22 RX ADMIN — CEFTRIAXONE 100 MILLIGRAM(S): 500 INJECTION, POWDER, FOR SOLUTION INTRAMUSCULAR; INTRAVENOUS at 17:24

## 2021-08-22 RX ADMIN — Medication 3: at 17:23

## 2021-08-22 RX ADMIN — Medication 3 MILLIGRAM(S): at 21:25

## 2021-08-22 RX ADMIN — Medication 1 TABLET(S): at 12:16

## 2021-08-22 RX ADMIN — OXYCODONE AND ACETAMINOPHEN 2 TABLET(S): 5; 325 TABLET ORAL at 15:00

## 2021-08-22 RX ADMIN — OXYCODONE AND ACETAMINOPHEN 2 TABLET(S): 5; 325 TABLET ORAL at 08:24

## 2021-08-22 RX ADMIN — Medication 150 MILLIGRAM(S): at 21:25

## 2021-08-22 RX ADMIN — OXYCODONE AND ACETAMINOPHEN 2 TABLET(S): 5; 325 TABLET ORAL at 01:06

## 2021-08-22 RX ADMIN — AZITHROMYCIN 255 MILLIGRAM(S): 500 TABLET, FILM COATED ORAL at 17:24

## 2021-08-22 RX ADMIN — OXYCODONE AND ACETAMINOPHEN 2 TABLET(S): 5; 325 TABLET ORAL at 20:43

## 2021-08-22 RX ADMIN — SENNA PLUS 2 TABLET(S): 8.6 TABLET ORAL at 21:25

## 2021-08-22 RX ADMIN — ENOXAPARIN SODIUM 40 MILLIGRAM(S): 100 INJECTION SUBCUTANEOUS at 12:17

## 2021-08-22 RX ADMIN — Medication 1 PATCH: at 14:28

## 2021-08-22 RX ADMIN — OXYCODONE AND ACETAMINOPHEN 2 TABLET(S): 5; 325 TABLET ORAL at 01:26

## 2021-08-22 RX ADMIN — Medication 1 DROP(S): at 05:33

## 2021-08-22 RX ADMIN — BUDESONIDE AND FORMOTEROL FUMARATE DIHYDRATE 2 PUFF(S): 160; 4.5 AEROSOL RESPIRATORY (INHALATION) at 21:25

## 2021-08-22 RX ADMIN — Medication 20 MILLIGRAM(S): at 05:33

## 2021-08-22 RX ADMIN — RISPERIDONE 1 MILLIGRAM(S): 4 TABLET ORAL at 12:16

## 2021-08-22 RX ADMIN — PANTOPRAZOLE SODIUM 40 MILLIGRAM(S): 20 TABLET, DELAYED RELEASE ORAL at 05:32

## 2021-08-22 NOTE — ADVANCED PRACTICE NURSE CONSULT - ASSESSMENT
This is a 61yr old male patient admitted for Pneumonia, presenting with a Stage 2 Pressure Injury to the Coccyx (2cm x 1cm x 0.1cm) with pink tissue, scant drainage, and surrounding tissue blanchable erythema

## 2021-08-22 NOTE — PROGRESS NOTE ADULT - SUBJECTIVE AND OBJECTIVE BOX
Patient is a 61y old  Male who presents with a chief complaint of COPD EXACERBATION AND PNA (22 Aug 2021 10:00)    PATIENT IS SEEN AND EXAMINED IN MEDICAL FLOOR.  JAMILA [    ]    MT [   ]      GT [   ]    ALLERGIES:  No Known Allergies      Daily     Daily Weight in k.3 (21 Aug 2021 22:00)    VITALS:    Vital Signs Last 24 Hrs  T(C): 36.9 (22 Aug 2021 11:58), Max: 36.9 (22 Aug 2021 08:35)  T(F): 98.5 (22 Aug 2021 11:58), Max: 98.5 (22 Aug 2021 11:58)  HR: 102 (22 Aug 2021 11:58) (86 - 106)  BP: 102/58 (22 Aug 2021 11:58) (86/47 - 116/66)  BP(mean): --  RR: 18 (22 Aug 2021 11:58) (18 - 22)  SpO2: 94% (22 Aug 2021 11:58) (91% - 100%)    LABS:    CBC Full  -  ( 22 Aug 2021 06:05 )  WBC Count : 8.98 K/uL  RBC Count : 4.39 M/uL  Hemoglobin : 12.1 g/dL  Hematocrit : 40.6 %  Platelet Count - Automated : 235 K/uL  Mean Cell Volume : 92.5 fl  Mean Cell Hemoglobin : 27.6 pg  Mean Cell Hemoglobin Concentration : 29.8 gm/dL  Auto Neutrophil # : x  Auto Lymphocyte # : x  Auto Monocyte # : x  Auto Eosinophil # : x  Auto Basophil # : x  Auto Neutrophil % : x  Auto Lymphocyte % : x  Auto Monocyte % : x  Auto Eosinophil % : x  Auto Basophil % : x    PT/INR - ( 21 Aug 2021 12:15 )   PT: 13.9 sec;   INR: 1.18 ratio         PTT - ( 21 Aug 2021 12:15 )  PTT:33.3 sec      134<L>  |  89<L>  |  19<H>  ----------------------------<  317<H>  4.6   |  41<H>  |  0.62    Ca    9.0      22 Aug 2021 06:05  Phos  3.8     08  Mg     2.3         TPro  6.1  /  Alb  2.8<L>  /  TBili  0.3  /  DBili  x   /  AST  7<L>  /  ALT  16  /  AlkPhos  68      CAPILLARY BLOOD GLUCOSE      POCT Blood Glucose.: 356 mg/dL (22 Aug 2021 11:40)  POCT Blood Glucose.: 307 mg/dL (22 Aug 2021 08:11)  POCT Blood Glucose.: 250 mg/dL (21 Aug 2021 22:11)    CARDIAC MARKERS ( 21 Aug 2021 13:11 )  0.017 ng/mL / x     / 26 U/L / x     / x          LIVER FUNCTIONS - ( 21 Aug 2021 12:15 )  Alb: 2.8 g/dL / Pro: 6.1 g/dL / ALK PHOS: 68 U/L / ALT: 16 U/L DA / AST: 7 U/L / GGT: x           Creatinine Trend: 0.62<--, 0.54<--, 0.72<--, 0.74<--, 0.34<--, 0.41<--  I&O's Summary    21 Aug 2021 07:01  -  22 Aug 2021 07:00  --------------------------------------------------------  IN: 500 mL / OUT: 3000 mL / NET: -2500 mL    22 Aug 2021 07:01  -  22 Aug 2021 12:09  --------------------------------------------------------  IN: 0 mL / OUT: 1900 mL / NET: -1900 mL        ABG - ( 22 Aug 2021 03:53 )  pH, Arterial: 7.44  pH, Blood: x     /  pCO2: 64    /  pO2: 67    / HCO3: 44    / Base Excess: 16.4  /  SaO2: 94                      MEDICATIONS:    MEDICATIONS  (STANDING):  ALPRAZolam 0.5 milliGRAM(s) Oral two times a day  artificial tears (preservative free) Ophthalmic Solution 1 Drop(s) Both EYES two times a day  aspirin  chewable 81 milliGRAM(s) Oral daily  azithromycin  IVPB 500 milliGRAM(s) IV Intermittent every 24 hours  budesonide 160 MICROgram(s)/formoterol 4.5 MICROgram(s) Inhaler 2 Puff(s) Inhalation two times a day  cefTRIAXone   IVPB      cefTRIAXone   IVPB 1000 milliGRAM(s) IV Intermittent every 24 hours  enoxaparin Injectable 40 milliGRAM(s) SubCutaneous daily  furosemide   Injectable 20 milliGRAM(s) IV Push daily  insulin lispro (ADMELOG) corrective regimen sliding scale   SubCutaneous three times a day before meals  insulin lispro (ADMELOG) corrective regimen sliding scale   SubCutaneous at bedtime  melatonin 3 milliGRAM(s) Oral at bedtime  methylPREDNISolone sodium succinate Injectable 40 milliGRAM(s) IV Push every 8 hours  multivitamin/minerals 1 Tablet(s) Oral daily  nicotine - 21 mG/24Hr(s) Patch 1 patch Transdermal daily  pantoprazole    Tablet 40 milliGRAM(s) Oral before breakfast  polyethylene glycol 3350 17 Gram(s) Oral daily  risperiDONE   Tablet 1 milliGRAM(s) Oral daily  senna 2 Tablet(s) Oral at bedtime  simethicone 80 milliGRAM(s) Chew daily  tiotropium 18 MICROgram(s) Capsule 1 Capsule(s) Inhalation daily  traZODone 150 milliGRAM(s) Oral at bedtime      MEDICATIONS  (PRN):  acetaminophen   Tablet .. 650 milliGRAM(s) Oral every 6 hours PRN Temp greater or equal to 38C (100.4F), Mild Pain (1 - 3)  ALBUTerol    90 MICROgram(s) HFA Inhaler 2 Puff(s) Inhalation every 6 hours PRN Shortness of Breath and/or Wheezing  guaiFENesin Oral Liquid (Sugar-Free) 200 milliGRAM(s) Oral every 6 hours PRN Cough  oxycodone    5 mG/acetaminophen 325 mG 2 Tablet(s) Oral every 6 hours PRN Severe Pain (7 - 10)      REVIEW OF SYSTEMS:                           ALL ROS DONE [ X   ]    CONSTITUTIONAL:  LETHARGIC [   ], FEVER [   ], UNRESPONSIVE [   ]  CVS:  CP  [   ], SOB, [   ], PALPITATIONS [   ], DIZZYNESS [   ]  RS: COUGH [   ], SPUTUM [   ]  GI: ABDOMINAL PAIN [   ], NAUSEA [   ], VOMITINGS [   ], DIARRHEA [   ], CONSTIPATION [   ]  :  DYSURIA [   ], NOCTURIA [   ], INCREASED FREQUENCY [   ], DRIBLING [   ],  SKELETAL: PAINFUL JOINTS [   ], SWOLLEN JOINTS [   ], NECK ACHE [   ], LOW BACK ACHE [   ],  SKIN : ULCERS [   ], RASH [   ], ITCHING [   ]  CNS: HEAD ACHE [   ], DOUBLE VISION [   ], BLURRED VISION [   ], AMS / CONFUSION [   ], SEIZURES [   ], WEAKNESS [   ],TINGLING / NUMBNESS [   ]    PHYSICAL EXAMINATION:  GENERAL APPEARANCE: NO DISTRESS  HEENT:  NO PALLOR, NO  JVD,  NO   NODES, NECK SUPPLE  CVS: S1 +, S2 +,   RS: AEEB,  OCCASIONAL  RALES +,   NO RONCHI  ABD: SOFT, NT, NO, BS +  EXT: NO PE  SKIN: WARM,   SKELETAL:  ROM ACCEPTABLE  CNS:  AAO X    ,   DEFICITS    RADIOLOGY :      ASSESSMENT :     Pneumonia due to organism    COPD (chronic obstructive pulmonary disease)    Stented coronary artery    HLD (hyperlipidemia)    Pulmonary HTN    Type 2 diabetes mellitus without complication, with long-term current use of insulin    Coronary artery disease involving native coronary artery of native heart without angina pectoris    Bipolar 1 disorder    Smoker    Gastroesophageal reflux disease, esophagitis presence not specified    Oxygen dependent    COPD (chronic obstructive pulmonary disease)    DM (diabetes mellitus)    CAD (coronary artery disease)    GERD (gastroesophageal reflux disease)    HLD (hyperlipidemia)    Insomnia    Polyarthritis    No significant past surgical history    No significant past surgical history        PLAN:  HPI:  60 yo M from [facility] has PMH of HTN, HLD, DM, CHF, CAD w stents COPD on 2.5L at home, pulmonary hypertension on BIPAP ( 35% non complaint) presented to the ED c/o SOB and leg swelling. Of note, patient was admitted to ICU for Acute on chronic hypercapnic respiratory failure requiring intubation for airway protection, pressors and antibiotics. As per patient, he is active smoker and understands that his multiple admissions are related to his smoking however not ready to stop. Patient denies any chest pain, palpitations, dizziness or other complaints.       [facility] full code    (21 Aug 2021 15:40)    -      Patient is a 61y old  Male who presents with a chief complaint of COPD EXACERBATION AND PNA (22 Aug 2021 10:00)    PATIENT IS SEEN AND EXAMINED IN MEDICAL FLOOR.    ALLERGIES:  No Known Allergies      Daily     Daily Weight in k.3 (21 Aug 2021 22:00)    VITALS:    Vital Signs Last 24 Hrs  T(C): 36.9 (22 Aug 2021 11:58), Max: 36.9 (22 Aug 2021 08:35)  T(F): 98.5 (22 Aug 2021 11:58), Max: 98.5 (22 Aug 2021 11:58)  HR: 102 (22 Aug 2021 11:58) (86 - 106)  BP: 102/58 (22 Aug 2021 11:58) (86/47 - 116/66)  BP(mean): --  RR: 18 (22 Aug 2021 11:58) (18 - 22)  SpO2: 94% (22 Aug 2021 11:58) (91% - 100%)    LABS:    CBC Full  -  ( 22 Aug 2021 06:05 )  WBC Count : 8.98 K/uL  RBC Count : 4.39 M/uL  Hemoglobin : 12.1 g/dL  Hematocrit : 40.6 %  Platelet Count - Automated : 235 K/uL  Mean Cell Volume : 92.5 fl  Mean Cell Hemoglobin : 27.6 pg  Mean Cell Hemoglobin Concentration : 29.8 gm/dL  Auto Neutrophil # : x  Auto Lymphocyte # : x  Auto Monocyte # : x  Auto Eosinophil # : x  Auto Basophil # : x  Auto Neutrophil % : x  Auto Lymphocyte % : x  Auto Monocyte % : x  Auto Eosinophil % : x  Auto Basophil % : x    PT/INR - ( 21 Aug 2021 12:15 )   PT: 13.9 sec;   INR: 1.18 ratio         PTT - ( 21 Aug 2021 12:15 )  PTT:33.3 sec      134<L>  |  89<L>  |  19<H>  ----------------------------<  317<H>  4.6   |  41<H>  |  0.62    Ca    9.0      22 Aug 2021 06:05  Phos  3.8     08-  Mg     2.3     08-    TPro  6.1  /  Alb  2.8<L>  /  TBili  0.3  /  DBili  x   /  AST  7<L>  /  ALT  16  /  AlkPhos  68      CAPILLARY BLOOD GLUCOSE      POCT Blood Glucose.: 356 mg/dL (22 Aug 2021 11:40)  POCT Blood Glucose.: 307 mg/dL (22 Aug 2021 08:11)  POCT Blood Glucose.: 250 mg/dL (21 Aug 2021 22:11)    CARDIAC MARKERS ( 21 Aug 2021 13:11 )  0.017 ng/mL / x     / 26 U/L / x     / x          LIVER FUNCTIONS - ( 21 Aug 2021 12:15 )  Alb: 2.8 g/dL / Pro: 6.1 g/dL / ALK PHOS: 68 U/L / ALT: 16 U/L DA / AST: 7 U/L / GGT: x           Creatinine Trend: 0.62<--, 0.54<--, 0.72<--, 0.74<--, 0.34<--, 0.41<--  I&O's Summary    21 Aug 2021 07:01  -  22 Aug 2021 07:00  --------------------------------------------------------  IN: 500 mL / OUT: 3000 mL / NET: -2500 mL    22 Aug 2021 07:01  -  22 Aug 2021 12:09  --------------------------------------------------------  IN: 0 mL / OUT: 1900 mL / NET: -1900 mL        ABG - ( 22 Aug 2021 03:53 )  pH, Arterial: 7.44  pH, Blood: x     /  pCO2: 64    /  pO2: 67    / HCO3: 44    / Base Excess: 16.4  /  SaO2: 94          MEDICATIONS:    MEDICATIONS  (STANDING):  ALPRAZolam 0.5 milliGRAM(s) Oral two times a day  artificial tears (preservative free) Ophthalmic Solution 1 Drop(s) Both EYES two times a day  aspirin  chewable 81 milliGRAM(s) Oral daily  azithromycin  IVPB 500 milliGRAM(s) IV Intermittent every 24 hours  budesonide 160 MICROgram(s)/formoterol 4.5 MICROgram(s) Inhaler 2 Puff(s) Inhalation two times a day  cefTRIAXone   IVPB      cefTRIAXone   IVPB 1000 milliGRAM(s) IV Intermittent every 24 hours  enoxaparin Injectable 40 milliGRAM(s) SubCutaneous daily  furosemide   Injectable 20 milliGRAM(s) IV Push daily  insulin lispro (ADMELOG) corrective regimen sliding scale   SubCutaneous three times a day before meals  insulin lispro (ADMELOG) corrective regimen sliding scale   SubCutaneous at bedtime  melatonin 3 milliGRAM(s) Oral at bedtime  methylPREDNISolone sodium succinate Injectable 40 milliGRAM(s) IV Push every 8 hours  multivitamin/minerals 1 Tablet(s) Oral daily  nicotine - 21 mG/24Hr(s) Patch 1 patch Transdermal daily  pantoprazole    Tablet 40 milliGRAM(s) Oral before breakfast  polyethylene glycol 3350 17 Gram(s) Oral daily  risperiDONE   Tablet 1 milliGRAM(s) Oral daily  senna 2 Tablet(s) Oral at bedtime  simethicone 80 milliGRAM(s) Chew daily  tiotropium 18 MICROgram(s) Capsule 1 Capsule(s) Inhalation daily  traZODone 150 milliGRAM(s) Oral at bedtime      MEDICATIONS  (PRN):  acetaminophen   Tablet .. 650 milliGRAM(s) Oral every 6 hours PRN Temp greater or equal to 38C (100.4F), Mild Pain (1 - 3)  ALBUTerol    90 MICROgram(s) HFA Inhaler 2 Puff(s) Inhalation every 6 hours PRN Shortness of Breath and/or Wheezing  guaiFENesin Oral Liquid (Sugar-Free) 200 milliGRAM(s) Oral every 6 hours PRN Cough  oxycodone    5 mG/acetaminophen 325 mG 2 Tablet(s) Oral every 6 hours PRN Severe Pain (7 - 10)      REVIEW OF SYSTEMS:                           ALL ROS DONE [ X   ]    CONSTITUTIONAL:  LETHARGIC [   ], FEVER [   ], UNRESPONSIVE [   ]  CVS:  CP  [   ], SOB, [   ], PALPITATIONS [   ], DIZZYNESS [   ]  RS: COUGH [   ], SPUTUM [   ]  GI: ABDOMINAL PAIN [   ], NAUSEA [   ], VOMITINGS [   ], DIARRHEA [   ], CONSTIPATION [   ]  :  DYSURIA [   ], NOCTURIA [   ], INCREASED FREQUENCY [   ], DRIBLING [   ],  SKELETAL: PAINFUL JOINTS [   ], SWOLLEN JOINTS [   ], NECK ACHE [   ], LOW BACK ACHE [   ],  SKIN : ULCERS [   ], RASH [   ], ITCHING [   ]  CNS: HEAD ACHE [   ], DOUBLE VISION [   ], BLURRED VISION [   ], AMS / CONFUSION [   ], SEIZURES [   ], WEAKNESS [   ],TINGLING / NUMBNESS [   ]    PHYSICAL EXAMINATION:  GENERAL APPEARANCE: NO DISTRESS  HEENT:  NO PALLOR, NO  JVD,  NO   NODES, NECK SUPPLE  CVS: S1 +, S2 +,   RS: AEEB,  OCCASIONAL  RALES +,   NO RONCHI  ABD: SOFT, NT, NO, BS +  EXT: NO PE  SKIN: WARM,   SKELETAL:  ROM ACCEPTABLE  CNS:  AAO X  3,   DEFICITS    RADIOLOGY :    EXAM:  XR CHEST AP OR PA 1V                          EXAM:  XR CHEST PORTABLE IMMED 1V                            PROCEDURE DATE:  2021          INTERPRETATION:  AP chest on 2021 at 5:17 PM. Patient is short of breath and has history of COPD.    Heart is likely enlarged.    Rather prominent central pulmonary arteries suggest pulmonary hypertension.    Present film shows mild basal infiltrates new since 2021.    Follow-up AP chest, semierect, on 2021 at 12:00PM.    Chest unchanged.    IMPRESSION: Prominent emre suggesting pulmonary hypertension and bibasilar infiltrates are noted.        ASSESSMENT :     Pneumonia due to organism    COPD (chronic obstructive pulmonary disease)    Stented coronary artery    HLD (hyperlipidemia)    Pulmonary HTN    Type 2 diabetes mellitus without complication, with long-term current use of insulin    Coronary artery disease involving native coronary artery of native heart without angina pectoris    Bipolar 1 disorder    Smoker    Gastroesophageal reflux disease, esophagitis presence not specified    Oxygen dependent    COPD (chronic obstructive pulmonary disease)    DM (diabetes mellitus)    CAD (coronary artery disease)    GERD (gastroesophageal reflux disease)    HLD (hyperlipidemia)    Insomnia    Polyarthritis    No significant past surgical history    No significant past surgical history        PLAN:  HPI:  60 yo M from [facility] has PMH of HTN, HLD, DM, CHF, CAD w stents COPD on 2.5L at home, pulmonary hypertension on BIPAP ( 35% non complaint) presented to the ED c/o SOB and leg swelling. Of note, patient was admitted to ICU for Acute on chronic hypercapnic respiratory failure requiring intubation for airway protection, pressors and antibiotics. As per patient, he is active smoker and understands that his multiple admissions are related to his smoking however not ready to stop. Patient denies any chest pain, palpitations, dizziness or other complaints.       [facility] full code    (21 Aug 2021 15:40)    # ACUTE ON CHRONIC HYPOXIC HYPERCAPNIC RESPIRATORY FAILURE - S/T SUSPECTED CHF EXACERBATION VS. FLASH PULMONARY EDEMA W/ VOLUME OVERLOAD S/T URINARY RETENTION - IMPROVED ON BIPAP  - UNDERLYING EMPHYSEMA, MORBIDLY OBESE WITH RESTRICTIVE LUNG DISEASE, SUSPECT CYNTHIA, COPD ON HOME 2LNC  - ON SOLUMEDROL - WILL TAPER  - CONTINUE SYMBICORT AND SPIRIVA  - ON IV LASIX, STRICT IS AND OS  - D/C ROCEPHIN + AZITHROMYCIN, PROCAL IS LOW   - COUNSELLED ON TOBACCO CESSATION > 10 MINS, COUNSELLING ON BIPAP COMPLIANCE   - PULMONARY CONSULT, CARDIOLOGY CONSULT    # URINARY RETENTION - W/ AMOR  - F/U RENAL U/S    # CAD S/P STENTS  # HTN  # DM TYPE 2  # HLD  # CHRONIC LOW BACK ACHE AND PANIC DISORDER  # GI AND DVT PPX    NAEEM CABELLO MD COVERING ADRIANA CABELLO MD

## 2021-08-22 NOTE — ADVANCED PRACTICE NURSE CONSULT - RECOMMEDATIONS
-Clean the Coccyx wound with normal saline and apply skin prep to the surrounding skin  -Apply a Foam dressing Q 72hrs PRN  -Elevate/float the patients heels using pillows and reposition the patient Q 2hrs using wedges or pillows

## 2021-08-22 NOTE — PROGRESS NOTE ADULT - SUBJECTIVE AND OBJECTIVE BOX
Time of Visit:  Patient seen and examined.     MEDICATIONS  (STANDING):  ALPRAZolam 0.5 milliGRAM(s) Oral two times a day  artificial tears (preservative free) Ophthalmic Solution 1 Drop(s) Both EYES two times a day  aspirin  chewable 81 milliGRAM(s) Oral daily  azithromycin  IVPB 500 milliGRAM(s) IV Intermittent every 24 hours  budesonide 160 MICROgram(s)/formoterol 4.5 MICROgram(s) Inhaler 2 Puff(s) Inhalation two times a day  cefTRIAXone   IVPB      cefTRIAXone   IVPB 1000 milliGRAM(s) IV Intermittent every 24 hours  enoxaparin Injectable 40 milliGRAM(s) SubCutaneous daily  furosemide   Injectable 20 milliGRAM(s) IV Push daily  insulin lispro (ADMELOG) corrective regimen sliding scale   SubCutaneous three times a day before meals  insulin lispro (ADMELOG) corrective regimen sliding scale   SubCutaneous at bedtime  melatonin 3 milliGRAM(s) Oral at bedtime  methylPREDNISolone sodium succinate Injectable 40 milliGRAM(s) IV Push every 8 hours  multivitamin/minerals 1 Tablet(s) Oral daily  nicotine - 21 mG/24Hr(s) Patch 1 patch Transdermal daily  pantoprazole    Tablet 40 milliGRAM(s) Oral before breakfast  polyethylene glycol 3350 17 Gram(s) Oral daily  risperiDONE   Tablet 1 milliGRAM(s) Oral daily  senna 2 Tablet(s) Oral at bedtime  simethicone 80 milliGRAM(s) Chew daily  tiotropium 18 MICROgram(s) Capsule 1 Capsule(s) Inhalation daily  traZODone 150 milliGRAM(s) Oral at bedtime      MEDICATIONS  (PRN):  acetaminophen   Tablet .. 650 milliGRAM(s) Oral every 6 hours PRN Temp greater or equal to 38C (100.4F), Mild Pain (1 - 3)  ALBUTerol    90 MICROgram(s) HFA Inhaler 2 Puff(s) Inhalation every 6 hours PRN Shortness of Breath and/or Wheezing  guaiFENesin Oral Liquid (Sugar-Free) 200 milliGRAM(s) Oral every 6 hours PRN Cough  oxycodone    5 mG/acetaminophen 325 mG 2 Tablet(s) Oral every 6 hours PRN Severe Pain (7 - 10)       Medications up to date at time of exam.      PHYSICAL EXAMINATION:  Patient has no new complaints.  GENERAL: The patient is a well-developed, well-nourished, in no apparent distress.     Vital Signs Last 24 Hrs  T(C): 36.8 (22 Aug 2021 15:49), Max: 36.9 (22 Aug 2021 08:35)  T(F): 98.2 (22 Aug 2021 15:49), Max: 98.5 (22 Aug 2021 11:58)  HR: 99 (22 Aug 2021 15:49) (96 - 106)  BP: 115/69 (22 Aug 2021 15:49) (86/47 - 116/66)  BP(mean): --  RR: 18 (22 Aug 2021 15:49) (18 - 22)  SpO2: 96% (22 Aug 2021 15:49) (91% - 100%)   (if applicable)    Chest Tube (if applicable)    HEENT: Head is normocephalic and atraumatic. Extraocular muscles are intact. Mucous membranes are moist.     NECK: Supple, no palpable adenopathy.    LUNGS: Clear to auscultation, no wheezing, rales, or rhonchi.    HEART: Regular rate and rhythm without murmur.    ABDOMEN: Soft, nontender, and nondistended.  No hepatosplenomegaly is noted.    : No painful voiding, no pelvic pain    EXTREMITIES: Without any cyanosis, clubbing, rash, lesions or edema. + peau d'orange on b/l lower ext     NEUROLOGIC: Awake, alert, oriented, grossly intact    SKIN: Warm, dry, good turgor.      LABS:                        12.1   8.98  )-----------( 235      ( 22 Aug 2021 06:05 )             40.6     08-22    134<L>  |  89<L>  |  19<H>  ----------------------------<  317<H>  4.6   |  41<H>  |  0.62    Ca    9.0      22 Aug 2021 06:05  Phos  3.8     08-22  Mg     2.3     08-22    TPro  6.1  /  Alb  2.8<L>  /  TBili  0.3  /  DBili  x   /  AST  7<L>  /  ALT  16  /  AlkPhos  68  08-21    PT/INR - ( 21 Aug 2021 12:15 )   PT: 13.9 sec;   INR: 1.18 ratio         PTT - ( 21 Aug 2021 12:15 )  PTT:33.3 sec  Urinalysis Basic - ( 21 Aug 2021 16:37 )    Color: Yellow / Appearance: very cloudy / S.005 / pH: x  Gluc: x / Ketone: Trace  / Bili: Negative / Urobili: Negative   Blood: x / Protein: 30 mg/dL / Nitrite: Negative   Leuk Esterase: Moderate / RBC: 2-5 /HPF / WBC 26-50 /HPF   Sq Epi: x / Non Sq Epi: Few /HPF / Bacteria: Many /HPF      ABG - ( 22 Aug 2021 03:53 )  pH, Arterial: 7.44  pH, Blood: x     /  pCO2: 64    /  pO2: 67    / HCO3: 44    / Base Excess: 16.4  /  SaO2: 94                CARDIAC MARKERS ( 21 Aug 2021 13:11 )  0.017 ng/mL / x     / 26 U/L / x     / x            Serum Pro-Brain Natriuretic Peptide: 382 pg/mL (21 @ 13:11)      Procalcitonin, Serum: 0.07 ng/mL (21 @ 08:19)      MICROBIOLOGY: (if applicable)    RADIOLOGY & ADDITIONAL STUDIES:  EKG:   CXR:  ECHO:    IMPRESSION: 61y Male PAST MEDICAL & SURGICAL HISTORY:  COPD (chronic obstructive pulmonary disease)  Oxygen 2.5L at home    Stented coronary artery  2017    HLD (hyperlipidemia)    Pulmonary HTN    Type 2 diabetes mellitus without complication, with long-term current use of insulin    Coronary artery disease involving native coronary artery of native heart without angina pectoris    Bipolar 1 disorder    Smoker    Gastroesophageal reflux disease, esophagitis presence not specified    Oxygen dependent    DM (diabetes mellitus)    CAD (coronary artery disease)    GERD (gastroesophageal reflux disease)    Insomnia    Polyarthritis    No significant past surgical history     p/w         IMP: 62 yo M from [facility] active smoker with  HTN, HLD, DM, CHF, CAD w stents chronic hypoxic resp failure due to  COPD requiring O2 supp @ 2.5L  pulmonary hypertension on BIPAP ( 35% non complaint) presented to the ED c/o SOB and leg swelling. Pat admitted for acute on chronic hypoxic hypercapnic resp failure requiring BiPaP support due to CHF.  Pat is afebrile, normal WBC and neg procalcitonin, CXR with b/l lower lungs infiltrate due to pulmonary edema . i doubt infectious processes         Sugg;  -continue BiPaP at night   -O2 supp to maintain sat >90%  -solumedrol 4o mg q8h  -albuterol inhaler   -consider d/c  antibx   -diuresis   -advise to stop smoking  -nicotine patch   -monitor blood sugar with coverage   -dvt/ gi prophy

## 2021-08-22 NOTE — CONSULT NOTE ADULT - SUBJECTIVE AND OBJECTIVE BOX
HISTORY OF PRESENT ILLNESS: 60 yo M from [facility] has PMH of HTN, HLD, DM, CHF, CAD w stents COPD on 2.5L at home, pulmonary hypertension on BIPAP (12/5 35% non complaint) presented to the ED c/o SOB and leg swelling. Of note, patient was admitted to ICU for Acute on chronic hypercapnic respiratory failure requiring intubation for airway protection, pressors and antibiotics. As per patient, he is active smoker and understands that his multiple admissions are related to his smoking however not ready to stop. Patient denies any chest pain, palpitations, dizziness or other complaints.     PAST MEDICAL & SURGICAL HISTORY:  COPD (chronic obstructive pulmonary disease)  Oxygen 2.5L at home    Stented coronary artery  2017    HLD (hyperlipidemia)    Pulmonary HTN    Type 2 diabetes mellitus without complication, with long-term current use of insulin    Coronary artery disease involving native coronary artery of native heart without angina pectoris    Bipolar 1 disorder    Smoker    Gastroesophageal reflux disease, esophagitis presence not specified    Oxygen dependent    DM (diabetes mellitus)    CAD (coronary artery disease)    GERD (gastroesophageal reflux disease)    Insomnia    Polyarthritis    No significant past surgical history        [ ] Diabetes   [ ] Hypertension  [ ] Hyperlipidemia  [ ] CAD  [ ] PCI  [ ] CABG    PREVIOUS DIAGNOSTIC TESTING:    [ ] Echocardiogram: ec< from: Transthoracic Echocardiogram (07.06.21 @ 07:20) >  CONCLUSIONS:  TECHNICALLY VERY DIFFICULT STUDY, POOR ACOUSTIC WINDOWS    1. Trace mitral regurgitation.  2. Normal left ventricular internaldimensions and wall  thicknesses.  3. Endocardium not well visualized; grossly normal left  ventricular systolic function based on limited views.  Segmental wall motion could not be assessed.  4. Right ventricular enlargement with normal RV systolic  function.  5. RV systolic pressure is 59 mm Hg. Moderate pulmonary  hypertension.    ------------------------------------------------------------------------  Confirmed on  7/6/2021 - 14:37:07 by Charli Mendes MD  ------------------------------------------------------------------------    < end of copied text >    [ ]  Catheterization:  [ ] Stress Test:  	    MEDICATIONS:  aspirin  chewable 81 milliGRAM(s) Oral daily  enoxaparin Injectable 40 milliGRAM(s) SubCutaneous daily  furosemide   Injectable 20 milliGRAM(s) IV Push daily    azithromycin  IVPB 500 milliGRAM(s) IV Intermittent every 24 hours  cefTRIAXone   IVPB      cefTRIAXone   IVPB 1000 milliGRAM(s) IV Intermittent every 24 hours    ALBUTerol    90 MICROgram(s) HFA Inhaler 2 Puff(s) Inhalation every 6 hours PRN  budesonide 160 MICROgram(s)/formoterol 4.5 MICROgram(s) Inhaler 2 Puff(s) Inhalation two times a day  guaiFENesin Oral Liquid (Sugar-Free) 200 milliGRAM(s) Oral every 6 hours PRN  tiotropium 18 MICROgram(s) Capsule 1 Capsule(s) Inhalation daily    acetaminophen   Tablet .. 650 milliGRAM(s) Oral every 6 hours PRN  ALPRAZolam 0.5 milliGRAM(s) Oral two times a day  melatonin 3 milliGRAM(s) Oral at bedtime  oxycodone    5 mG/acetaminophen 325 mG 2 Tablet(s) Oral every 6 hours PRN  risperiDONE   Tablet 1 milliGRAM(s) Oral daily  traZODone 150 milliGRAM(s) Oral at bedtime    pantoprazole    Tablet 40 milliGRAM(s) Oral before breakfast  polyethylene glycol 3350 17 Gram(s) Oral daily  senna 2 Tablet(s) Oral at bedtime  simethicone 80 milliGRAM(s) Chew daily    insulin lispro (ADMELOG) corrective regimen sliding scale   SubCutaneous three times a day before meals  insulin lispro (ADMELOG) corrective regimen sliding scale   SubCutaneous at bedtime  methylPREDNISolone sodium succinate Injectable 40 milliGRAM(s) IV Push every 8 hours    artificial tears (preservative free) Ophthalmic Solution 1 Drop(s) Both EYES two times a day  multivitamin/minerals 1 Tablet(s) Oral daily      Allergies    No Known Allergies    Intolerances        FAMILY HISTORY:  No family history of cardiovascular disease    No family history of chronic obstructive pulmonary disease    No family history of hypertension    No family history of mental disorder        SOCIAL HISTORY:    [ ] Non-smoker  [ ] Smoker  [ ] Alcohol      REVIEW OF SYSTEMS:  [ ]chest pain  [  ]shortness of breath  [  ]palpitations  [  ]syncope  [ ]near syncope [  ]diplopia  [  ]altered mental status   [  ]fevers  [ ]chills [ ]nausea  [ ]vomitting  [ ]abdominal pain  [ ]melena  [ ]BRBPR  [  ]epistaxis  [  ]rash  [  ]lower extremity edema      CONSTITUTIONAL: No fever, weight loss, or fatigue  EYES: No eye pain, visual disturbances, or discharge  ENMT:  No difficulty hearing, tinnitus, vertigo; No sinus or throat pain  NECK: No pain or stiffness  RESPIRATORY: No cough, wheezing, chills or hemoptysis; No Shortness of Breath  CARDIOVASCULAR: No chest pain, palpitations, passing out, dizziness, or leg swelling  GASTROINTESTINAL: No abdominal or epigastric pain. No nausea, vomiting, or hematemesis; No diarrhea or constipation. No melena or hematochezia.  GENITOURINARY: No dysuria, frequency, hematuria, or incontinence  NEUROLOGICAL: No headaches, memory loss, loss of strength, numbness, or tremors  SKIN: No itching, burning, rashes, or lesions   LYMPH Nodes: No enlarged glands  ENDOCRINE: No heat or cold intolerance; No hair loss  MUSCULOSKELETAL: No joint pain or swelling; No muscle, back, or extremity pain  PSYCHIATRIC: No depression, anxiety, mood swings, or difficulty sleeping  HEME/LYMPH: No easy bruising, or bleeding gums  ALLERY AND IMMUNOLOGIC: No hives or eczema	    [ ] All others negative	  [ ] Unable to obtain    PHYSICAL EXAM:  T(C): 36.9 (08-22-21 @ 08:35), Max: 36.9 (08-22-21 @ 08:35)  HR: 106 (08-22-21 @ 08:35) (86 - 112)  BP: 103/57 (08-22-21 @ 08:35) (86/47 - 155/98)  RR: 18 (08-22-21 @ 08:35) (18 - 25)  SpO2: 91% (08-22-21 @ 08:35) (91% - 100%)  Wt(kg): --  I&O's Summary    21 Aug 2021 07:01  -  22 Aug 2021 07:00  --------------------------------------------------------  IN: 500 mL / OUT: 3000 mL / NET: -2500 mL    22 Aug 2021 07:01  -  22 Aug 2021 10:00  --------------------------------------------------------  IN: 0 mL / OUT: 1900 mL / NET: -1900 mL        Appearance: Normal	  HEENT:   Normal oral mucosa, PERRL, EOMI	  Lymphatic: No lymphadenopathy  Cardiovascular: Normal S1 S2, No JVD, No murmurs, No edema  Respiratory: Lungs clear to auscultation	  Psychiatry: A & O x 3, Mood & affect appropriate  Gastrointestinal:  Soft, Non-tender, + BS	  Skin: No rashes, No ecchymoses, No cyanosis	  Neurologic: Non-focal  Extremities: Normal range of motion, No clubbing, cyanosis , ++ edema  Vascular: Peripheral pulses palpable 2+ bilaterally    TELEMETRY: 	  nsr   ECG:  	  RADIOLOGY:  OTHER: 	  	  LABS:	 	    CARDIAC MARKERS:  Troponin I, Serum: 0.017 ng/mL (08-21 @ 13:11)                                  12.1   8.98  )-----------( 235      ( 22 Aug 2021 06:05 )             40.6     08-22    134<L>  |  89<L>  |  19<H>  ----------------------------<  317<H>  4.6   |  41<H>  |  0.62    Ca    9.0      22 Aug 2021 06:05  Phos  3.8     08-22  Mg     2.3     08-22    TPro  6.1  /  Alb  2.8<L>  /  TBili  0.3  /  DBili  x   /  AST  7<L>  /  ALT  16  /  AlkPhos  68  08-21    proBNP: Serum Pro-Brain Natriuretic Peptide: 382 pg/mL (08-21 @ 13:11)    Lipid Profile:   HgA1c:   TSH:     ASSESSMENT/PLAN: 	60 yo M from [facility] has PMH of HTN, HLD, DM, CHF, CAD w stents COPD on 2.5L at home, pulmonary hypertension on BIPAP (12/5 35% non complaint) presented to the ED c/o SOB and leg swelling.  Pt with mixed picture of copd excab and CHF    Keep net neg w/ IV lasix   GI / DVT prophylaxis.   keep K>4, mag >2.0   copd management per medicine  daily weight  SUpp o2   Tele stable   Bipap support   D/W Dr Valerio        HISTORY OF PRESENT ILLNESS: 60 yo M from [facility] has PMH of HTN, HLD, DM, CAD (normal LVEF) w stents COPD on 2.5L at home, pulmonary hypertension on BIPAP (12/5 35% non complaint) presented to the ED c/o SOB and leg swelling. Of note, patient was admitted to ICU for Acute on chronic hypercapnic respiratory failure requiring intubation for airway protection, pressors and antibiotics. As per patient, he is active smoker and understands that his multiple admissions are related to his smoking however not ready to stop. Patient denies any chest pain, palpitations, dizziness or other complaints.     PAST MEDICAL & SURGICAL HISTORY:  COPD (chronic obstructive pulmonary disease)  Oxygen 2.5L at home    Stented coronary artery  2017    HLD (hyperlipidemia)    Pulmonary HTN    Type 2 diabetes mellitus without complication, with long-term current use of insulin    Coronary artery disease involving native coronary artery of native heart without angina pectoris    Bipolar 1 disorder    Smoker    Gastroesophageal reflux disease, esophagitis presence not specified    Oxygen dependent    DM (diabetes mellitus)    CAD (coronary artery disease)    GERD (gastroesophageal reflux disease)    Insomnia    Polyarthritis    No significant past surgical history        [ ] Diabetes   [ ] Hypertension  [ ] Hyperlipidemia  [ ] CAD  [ ] PCI  [ ] CABG    PREVIOUS DIAGNOSTIC TESTING:    [ ] Echocardiogram: ec< from: Transthoracic Echocardiogram (07.06.21 @ 07:20) >  CONCLUSIONS:  TECHNICALLY VERY DIFFICULT STUDY, POOR ACOUSTIC WINDOWS    1. Trace mitral regurgitation.  2. Normal left ventricular internaldimensions and wall  thicknesses.  3. Endocardium not well visualized; grossly normal left  ventricular systolic function based on limited views.  Segmental wall motion could not be assessed.  4. Right ventricular enlargement with normal RV systolic  function.  5. RV systolic pressure is 59 mm Hg. Moderate pulmonary  hypertension.    ------------------------------------------------------------------------  Confirmed on  7/6/2021 - 14:37:07 by Charli Mendes MD  ------------------------------------------------------------------------    < end of copied text >    [ ]  Catheterization:  [ ] Stress Test:  	    MEDICATIONS:  aspirin  chewable 81 milliGRAM(s) Oral daily  enoxaparin Injectable 40 milliGRAM(s) SubCutaneous daily  furosemide   Injectable 20 milliGRAM(s) IV Push daily    azithromycin  IVPB 500 milliGRAM(s) IV Intermittent every 24 hours  cefTRIAXone   IVPB      cefTRIAXone   IVPB 1000 milliGRAM(s) IV Intermittent every 24 hours    ALBUTerol    90 MICROgram(s) HFA Inhaler 2 Puff(s) Inhalation every 6 hours PRN  budesonide 160 MICROgram(s)/formoterol 4.5 MICROgram(s) Inhaler 2 Puff(s) Inhalation two times a day  guaiFENesin Oral Liquid (Sugar-Free) 200 milliGRAM(s) Oral every 6 hours PRN  tiotropium 18 MICROgram(s) Capsule 1 Capsule(s) Inhalation daily    acetaminophen   Tablet .. 650 milliGRAM(s) Oral every 6 hours PRN  ALPRAZolam 0.5 milliGRAM(s) Oral two times a day  melatonin 3 milliGRAM(s) Oral at bedtime  oxycodone    5 mG/acetaminophen 325 mG 2 Tablet(s) Oral every 6 hours PRN  risperiDONE   Tablet 1 milliGRAM(s) Oral daily  traZODone 150 milliGRAM(s) Oral at bedtime    pantoprazole    Tablet 40 milliGRAM(s) Oral before breakfast  polyethylene glycol 3350 17 Gram(s) Oral daily  senna 2 Tablet(s) Oral at bedtime  simethicone 80 milliGRAM(s) Chew daily    insulin lispro (ADMELOG) corrective regimen sliding scale   SubCutaneous three times a day before meals  insulin lispro (ADMELOG) corrective regimen sliding scale   SubCutaneous at bedtime  methylPREDNISolone sodium succinate Injectable 40 milliGRAM(s) IV Push every 8 hours    artificial tears (preservative free) Ophthalmic Solution 1 Drop(s) Both EYES two times a day  multivitamin/minerals 1 Tablet(s) Oral daily      Allergies    No Known Allergies    Intolerances        FAMILY HISTORY:  No family history of cardiovascular disease    No family history of chronic obstructive pulmonary disease    No family history of hypertension    No family history of mental disorder        SOCIAL HISTORY:    [ ] Non-smoker  [ ] Smoker  [ ] Alcohol      REVIEW OF SYSTEMS:  [ ]chest pain  [  ]shortness of breath  [  ]palpitations  [  ]syncope  [ ]near syncope [  ]diplopia  [  ]altered mental status   [  ]fevers  [ ]chills [ ]nausea  [ ]vomitting  [ ]abdominal pain  [ ]melena  [ ]BRBPR  [  ]epistaxis  [  ]rash  [  ]lower extremity edema      CONSTITUTIONAL: No fever, weight loss, or fatigue  EYES: No eye pain, visual disturbances, or discharge  ENMT:  No difficulty hearing, tinnitus, vertigo; No sinus or throat pain  NECK: No pain or stiffness  RESPIRATORY: No cough, wheezing, chills or hemoptysis; No Shortness of Breath  CARDIOVASCULAR: No chest pain, palpitations, passing out, dizziness, or leg swelling  GASTROINTESTINAL: No abdominal or epigastric pain. No nausea, vomiting, or hematemesis; No diarrhea or constipation. No melena or hematochezia.  GENITOURINARY: No dysuria, frequency, hematuria, or incontinence  NEUROLOGICAL: No headaches, memory loss, loss of strength, numbness, or tremors  SKIN: No itching, burning, rashes, or lesions   LYMPH Nodes: No enlarged glands  ENDOCRINE: No heat or cold intolerance; No hair loss  MUSCULOSKELETAL: No joint pain or swelling; No muscle, back, or extremity pain  PSYCHIATRIC: No depression, anxiety, mood swings, or difficulty sleeping  HEME/LYMPH: No easy bruising, or bleeding gums  ALLERY AND IMMUNOLOGIC: No hives or eczema	    [ ] All others negative	  [ ] Unable to obtain    PHYSICAL EXAM:  T(C): 36.9 (08-22-21 @ 08:35), Max: 36.9 (08-22-21 @ 08:35)  HR: 106 (08-22-21 @ 08:35) (86 - 112)  BP: 103/57 (08-22-21 @ 08:35) (86/47 - 155/98)  RR: 18 (08-22-21 @ 08:35) (18 - 25)  SpO2: 91% (08-22-21 @ 08:35) (91% - 100%)  Wt(kg): --  I&O's Summary    21 Aug 2021 07:01  -  22 Aug 2021 07:00  --------------------------------------------------------  IN: 500 mL / OUT: 3000 mL / NET: -2500 mL    22 Aug 2021 07:01  -  22 Aug 2021 10:00  --------------------------------------------------------  IN: 0 mL / OUT: 1900 mL / NET: -1900 mL        Appearance: Normal	  HEENT:   Normal oral mucosa, PERRL, EOMI	  Lymphatic: No lymphadenopathy  Cardiovascular: Normal S1 S2, No JVD, No murmurs, No edema  Respiratory: Lungs clear to auscultation	  Psychiatry: A & O x 3, Mood & affect appropriate  Gastrointestinal:  Soft, Non-tender, + BS	  Skin: No rashes, No ecchymoses, No cyanosis	  Neurologic: Non-focal  Extremities: Normal range of motion, No clubbing, cyanosis , ++ edema  Vascular: Peripheral pulses palpable 2+ bilaterally    TELEMETRY: 	  nsr   ECG:  	  RADIOLOGY:  OTHER: 	  	  LABS:	 	    CARDIAC MARKERS:  Troponin I, Serum: 0.017 ng/mL (08-21 @ 13:11)                                  12.1   8.98  )-----------( 235      ( 22 Aug 2021 06:05 )             40.6     08-22    134<L>  |  89<L>  |  19<H>  ----------------------------<  317<H>  4.6   |  41<H>  |  0.62    Ca    9.0      22 Aug 2021 06:05  Phos  3.8     08-22  Mg     2.3     08-22    TPro  6.1  /  Alb  2.8<L>  /  TBili  0.3  /  DBili  x   /  AST  7<L>  /  ALT  16  /  AlkPhos  68  08-21    proBNP: Serum Pro-Brain Natriuretic Peptide: 382 pg/mL (08-21 @ 13:11)    Lipid Profile:   HgA1c:   TSH:     ASSESSMENT/PLAN: 	60 [facility] PMH of HTN, HLD, DM, CAD (normal LVEF) w stents COPD on 2.5L at home, pulmonary hypertension on BIPAP (12/5 35% non complaint) presented to the ED c/o SOB and leg swelling.  Pt with mixed picture of copd excab and CHF    Keep net neg w/ IV lasix   GI / DVT prophylaxis.   keep K>4, mag >2.0   copd management per medicine  daily weight  SUpp o2   Tele stable   Bipap support   D/W Dr Valerio

## 2021-08-23 LAB
ANION GAP SERPL CALC-SCNC: 1 MMOL/L — LOW (ref 5–17)
BUN SERPL-MCNC: 19 MG/DL — HIGH (ref 7–18)
CALCIUM SERPL-MCNC: 8.7 MG/DL — SIGNIFICANT CHANGE UP (ref 8.4–10.5)
CHLORIDE SERPL-SCNC: 95 MMOL/L — LOW (ref 96–108)
CO2 SERPL-SCNC: 43 MMOL/L — HIGH (ref 22–31)
CREAT SERPL-MCNC: 0.56 MG/DL — SIGNIFICANT CHANGE UP (ref 0.5–1.3)
GLUCOSE BLDC GLUCOMTR-MCNC: 297 MG/DL — HIGH (ref 70–99)
GLUCOSE BLDC GLUCOMTR-MCNC: 311 MG/DL — HIGH (ref 70–99)
GLUCOSE BLDC GLUCOMTR-MCNC: 319 MG/DL — HIGH (ref 70–99)
GLUCOSE BLDC GLUCOMTR-MCNC: 332 MG/DL — HIGH (ref 70–99)
GLUCOSE BLDC GLUCOMTR-MCNC: 349 MG/DL — HIGH (ref 70–99)
GLUCOSE SERPL-MCNC: 295 MG/DL — HIGH (ref 70–99)
HCT VFR BLD CALC: 40.1 % — SIGNIFICANT CHANGE UP (ref 39–50)
HGB BLD-MCNC: 12.2 G/DL — LOW (ref 13–17)
MAGNESIUM SERPL-MCNC: 2.4 MG/DL — SIGNIFICANT CHANGE UP (ref 1.6–2.6)
MCHC RBC-ENTMCNC: 28.7 PG — SIGNIFICANT CHANGE UP (ref 27–34)
MCHC RBC-ENTMCNC: 30.4 GM/DL — LOW (ref 32–36)
MCV RBC AUTO: 94.4 FL — SIGNIFICANT CHANGE UP (ref 80–100)
NRBC # BLD: 0 /100 WBCS — SIGNIFICANT CHANGE UP (ref 0–0)
PLATELET # BLD AUTO: 216 K/UL — SIGNIFICANT CHANGE UP (ref 150–400)
POTASSIUM SERPL-MCNC: 4.5 MMOL/L — SIGNIFICANT CHANGE UP (ref 3.5–5.3)
POTASSIUM SERPL-SCNC: 4.5 MMOL/L — SIGNIFICANT CHANGE UP (ref 3.5–5.3)
RBC # BLD: 4.25 M/UL — SIGNIFICANT CHANGE UP (ref 4.2–5.8)
RBC # FLD: 14.4 % — SIGNIFICANT CHANGE UP (ref 10.3–14.5)
SODIUM SERPL-SCNC: 139 MMOL/L — SIGNIFICANT CHANGE UP (ref 135–145)
WBC # BLD: 8.86 K/UL — SIGNIFICANT CHANGE UP (ref 3.8–10.5)
WBC # FLD AUTO: 8.86 K/UL — SIGNIFICANT CHANGE UP (ref 3.8–10.5)

## 2021-08-23 RX ORDER — MAGNESIUM HYDROXIDE 400 MG/1
30 TABLET, CHEWABLE ORAL ONCE
Refills: 0 | Status: COMPLETED | OUTPATIENT
Start: 2021-08-23 | End: 2021-08-23

## 2021-08-23 RX ADMIN — Medication 4: at 17:05

## 2021-08-23 RX ADMIN — SIMETHICONE 80 MILLIGRAM(S): 80 TABLET, CHEWABLE ORAL at 11:54

## 2021-08-23 RX ADMIN — Medication 81 MILLIGRAM(S): at 11:52

## 2021-08-23 RX ADMIN — PANTOPRAZOLE SODIUM 40 MILLIGRAM(S): 20 TABLET, DELAYED RELEASE ORAL at 05:19

## 2021-08-23 RX ADMIN — TIOTROPIUM BROMIDE 1 CAPSULE(S): 18 CAPSULE ORAL; RESPIRATORY (INHALATION) at 11:54

## 2021-08-23 RX ADMIN — BUDESONIDE AND FORMOTEROL FUMARATE DIHYDRATE 2 PUFF(S): 160; 4.5 AEROSOL RESPIRATORY (INHALATION) at 23:07

## 2021-08-23 RX ADMIN — Medication 0.5 MILLIGRAM(S): at 05:19

## 2021-08-23 RX ADMIN — Medication 1 DROP(S): at 05:18

## 2021-08-23 RX ADMIN — ENOXAPARIN SODIUM 40 MILLIGRAM(S): 100 INJECTION SUBCUTANEOUS at 12:04

## 2021-08-23 RX ADMIN — OXYCODONE AND ACETAMINOPHEN 2 TABLET(S): 5; 325 TABLET ORAL at 13:00

## 2021-08-23 RX ADMIN — Medication 40 MILLIGRAM(S): at 05:19

## 2021-08-23 RX ADMIN — Medication 4: at 11:52

## 2021-08-23 RX ADMIN — Medication 1 DROP(S): at 20:22

## 2021-08-23 RX ADMIN — Medication 150 MILLIGRAM(S): at 23:06

## 2021-08-23 RX ADMIN — Medication 1 PATCH: at 11:54

## 2021-08-23 RX ADMIN — POLYETHYLENE GLYCOL 3350 17 GRAM(S): 17 POWDER, FOR SOLUTION ORAL at 11:53

## 2021-08-23 RX ADMIN — SENNA PLUS 2 TABLET(S): 8.6 TABLET ORAL at 23:06

## 2021-08-23 RX ADMIN — OXYCODONE AND ACETAMINOPHEN 2 TABLET(S): 5; 325 TABLET ORAL at 05:19

## 2021-08-23 RX ADMIN — AZITHROMYCIN 255 MILLIGRAM(S): 500 TABLET, FILM COATED ORAL at 17:06

## 2021-08-23 RX ADMIN — Medication 1 TABLET(S): at 11:52

## 2021-08-23 RX ADMIN — OXYCODONE AND ACETAMINOPHEN 2 TABLET(S): 5; 325 TABLET ORAL at 12:04

## 2021-08-23 RX ADMIN — Medication 0.5 MILLIGRAM(S): at 17:30

## 2021-08-23 RX ADMIN — RISPERIDONE 1 MILLIGRAM(S): 4 TABLET ORAL at 11:55

## 2021-08-23 RX ADMIN — Medication 3 MILLIGRAM(S): at 23:06

## 2021-08-23 RX ADMIN — MAGNESIUM HYDROXIDE 30 MILLILITER(S): 400 TABLET, CHEWABLE ORAL at 17:36

## 2021-08-23 RX ADMIN — OXYCODONE AND ACETAMINOPHEN 2 TABLET(S): 5; 325 TABLET ORAL at 17:30

## 2021-08-23 RX ADMIN — Medication 20 MILLIGRAM(S): at 05:20

## 2021-08-23 RX ADMIN — ALBUTEROL 2 PUFF(S): 90 AEROSOL, METERED ORAL at 05:23

## 2021-08-23 RX ADMIN — Medication 1 PATCH: at 19:45

## 2021-08-23 RX ADMIN — BUDESONIDE AND FORMOTEROL FUMARATE DIHYDRATE 2 PUFF(S): 160; 4.5 AEROSOL RESPIRATORY (INHALATION) at 11:53

## 2021-08-23 RX ADMIN — Medication 1: at 23:07

## 2021-08-23 RX ADMIN — Medication 1 PATCH: at 07:00

## 2021-08-23 RX ADMIN — CEFTRIAXONE 100 MILLIGRAM(S): 500 INJECTION, POWDER, FOR SOLUTION INTRAMUSCULAR; INTRAVENOUS at 17:05

## 2021-08-23 RX ADMIN — OXYCODONE AND ACETAMINOPHEN 2 TABLET(S): 5; 325 TABLET ORAL at 18:30

## 2021-08-23 RX ADMIN — Medication 4: at 08:07

## 2021-08-23 NOTE — DIETITIAN INITIAL EVALUATION ADULT. - OTHER INFO
Pt from assisted living facility, recent admissions with intubation/vent in ICU last month, Moderate Malnutrition identified 7/28/21; Pt visited, alert, oriented, well-communicated, dislikes food, requesting more fluid/food, "Service Recover" provided at breakfast today, basic diet Rx with fluid restriction reinforced; appetite good, denied GI distress/swallowing problem at present, poor dentition/missing teeth, prefers sandwich at lunch and dinner, more food choices obtained and forwarded to Dietary; Discussed with RN and Diet Tech Pt from assisted living facility, recent admissions with intubation/vent in ICU last month, Moderate Malnutrition identified 7/28/21; Pt visited, alert, oriented, well-communicated, not happy with food received, dislikes some food, requesting more fluid/food, "Service Recover" provided at breakfast today, basic diet Rx with fluid restriction reinforced; appetite good, 100% intake observed, denied GI distress/swallowing problem at present, poor dentition/missing teeth, prefers sandwich at lunch and dinner, more food choices obtained and forwarded to Dietary. H/O DM, HgbA1C= 6.5 7/5/21, Finger stick range=83 to 356, on steroid Rx noted, pt not a candidate for comprehensive diet education at present Pt from assisted living facility, recent admissions with intubation/vent in ICU last month, Moderate Malnutrition identified 7/28/21; Pt visited, alert, oriented, well-communicated, not happy with food received, dislikes some food, requesting more fluid/food, "Service Recover" provided at breakfast today, basic diet Rx with fluid restriction reinforced; appetite good, 100% intake observed, denied GI distress/swallowing problem at present, poor dentition/missing teeth, prefers sandwich at lunch and dinner, more food choices obtained and forwarded to Dietary. H/O DM, HgbA1C= 6.5 7/5/21, Finger stick range=83 to 356, on steroid Rx noted, pt not a candidate for comprehensive diet education at present due to disinterest/unwillingness

## 2021-08-23 NOTE — PROGRESS NOTE ADULT - SUBJECTIVE AND OBJECTIVE BOX
C A R D I O L O G Y  **********************************     DATE OF SERVICE: 08-23-21    Patient denies chest pain or shortness of breath.   Review of systems otherwise (-)  	  MEDICATIONS:  MEDICATIONS  (STANDING):  ALPRAZolam 0.5 milliGRAM(s) Oral two times a day  artificial tears (preservative free) Ophthalmic Solution 1 Drop(s) Both EYES two times a day  aspirin  chewable 81 milliGRAM(s) Oral daily  azithromycin  IVPB 500 milliGRAM(s) IV Intermittent every 24 hours  budesonide 160 MICROgram(s)/formoterol 4.5 MICROgram(s) Inhaler 2 Puff(s) Inhalation two times a day  cefTRIAXone   IVPB      cefTRIAXone   IVPB 1000 milliGRAM(s) IV Intermittent every 24 hours  enoxaparin Injectable 40 milliGRAM(s) SubCutaneous daily  furosemide   Injectable 20 milliGRAM(s) IV Push daily  insulin lispro (ADMELOG) corrective regimen sliding scale   SubCutaneous three times a day before meals  insulin lispro (ADMELOG) corrective regimen sliding scale   SubCutaneous at bedtime  melatonin 3 milliGRAM(s) Oral at bedtime  methylPREDNISolone sodium succinate Injectable 40 milliGRAM(s) IV Push every 12 hours  multivitamin/minerals 1 Tablet(s) Oral daily  nicotine - 21 mG/24Hr(s) Patch 1 patch Transdermal daily  pantoprazole    Tablet 40 milliGRAM(s) Oral before breakfast  polyethylene glycol 3350 17 Gram(s) Oral daily  risperiDONE   Tablet 1 milliGRAM(s) Oral daily  senna 2 Tablet(s) Oral at bedtime  simethicone 80 milliGRAM(s) Chew daily  tiotropium 18 MICROgram(s) Capsule 1 Capsule(s) Inhalation daily  traZODone 150 milliGRAM(s) Oral at bedtime      LABS:	 	    CARDIAC MARKERS:  CARDIAC MARKERS ( 21 Aug 2021 13:11 )  0.017 ng/mL / x     / 26 U/L / x     / x                                    12.2   8.86  )-----------( 216      ( 23 Aug 2021 08:20 )             40.1     Hemoglobin: 12.2 g/dL (08-23 @ 08:20)  Hemoglobin: 12.1 g/dL (08-22 @ 06:05)  Hemoglobin: 12.6 g/dL (08-21 @ 12:15)      08-23    139  |  95<L>  |  19<H>  ----------------------------<  295<H>  4.5   |  43<H>  |  0.56    Ca    8.7      23 Aug 2021 08:20  Phos  3.8     08-22  Mg     2.4     08-23    TPro  6.1  /  Alb  2.8<L>  /  TBili  0.3  /  DBili  x   /  AST  7<L>  /  ALT  16  /  AlkPhos  68  08-21    Creatinine Trend: 0.56<--, 0.62<--, 0.54<--, 0.72<--, 0.74<--, 0.34<--          PHYSICAL EXAM:  T(C): 36.8 (08-23-21 @ 11:12), Max: 36.9 (08-23-21 @ 04:40)  HR: 91 (08-23-21 @ 11:12) (86 - 100)  BP: 114/71 (08-23-21 @ 11:12) (102/63 - 120/60)  RR: 19 (08-23-21 @ 11:12) (18 - 19)  SpO2: 96% (08-23-21 @ 11:12) (92% - 97%)  Wt(kg): --  I&O's Summary    22 Aug 2021 07:01  -  23 Aug 2021 07:00  --------------------------------------------------------  IN: 0 mL / OUT: 5000 mL / NET: -5000 mL          Gen: Appears well in NAD  HEENT:  (-)icterus (-)pallor  CV: N S1 S2 1/6 MARCELA (+)2 Pulses B/l  Resp:  Clear to ausculatation B/L, shallow effort  GI: (+) BS Soft, NT, ND  Lymph:  (-)Edema, (-)obvious lymphadenopathy  Skin: Warm to touch, Normal turgor  Psych: Appropriate mood and affect      TELEMETRY: 	 sinus         ASSESSMENT/PLAN: 	61y  Male from [facility] has PMH of HTN, HLD, DM, CAD (normal LVEF) w stents COPD on 2.5L at home, pulmonary hypertension on BIPAP (12/5 35% non complaint) presented to the ED c/o SOB and leg swelling.  Pt with mixed picture of copd excab and CHF    - Demonstrating sequale of RV dysfx and cor pulmonale physiology  - Pulm f/u  - Can keep net negative  - can D/C tele    Charli Mendes MD, [facility]  BEEPER [phone]

## 2021-08-23 NOTE — PROGRESS NOTE ADULT - SUBJECTIVE AND OBJECTIVE BOX
Time of Visit:  Patient seen and examined. laying in bed comfortable     MEDICATIONS  (STANDING):  ALPRAZolam 0.5 milliGRAM(s) Oral two times a day  artificial tears (preservative free) Ophthalmic Solution 1 Drop(s) Both EYES two times a day  aspirin  chewable 81 milliGRAM(s) Oral daily  azithromycin  IVPB 500 milliGRAM(s) IV Intermittent every 24 hours  bisacodyl Suppository 10 milliGRAM(s) Rectal once  budesonide 160 MICROgram(s)/formoterol 4.5 MICROgram(s) Inhaler 2 Puff(s) Inhalation two times a day  cefTRIAXone   IVPB      cefTRIAXone   IVPB 1000 milliGRAM(s) IV Intermittent every 24 hours  enoxaparin Injectable 40 milliGRAM(s) SubCutaneous daily  furosemide   Injectable 20 milliGRAM(s) IV Push daily  insulin lispro (ADMELOG) corrective regimen sliding scale   SubCutaneous three times a day before meals  insulin lispro (ADMELOG) corrective regimen sliding scale   SubCutaneous at bedtime  melatonin 3 milliGRAM(s) Oral at bedtime  multivitamin/minerals 1 Tablet(s) Oral daily  nicotine - 21 mG/24Hr(s) Patch 1 patch Transdermal daily  pantoprazole    Tablet 40 milliGRAM(s) Oral before breakfast  polyethylene glycol 3350 17 Gram(s) Oral daily  risperiDONE   Tablet 1 milliGRAM(s) Oral daily  senna 2 Tablet(s) Oral at bedtime  simethicone 80 milliGRAM(s) Chew daily  tiotropium 18 MICROgram(s) Capsule 1 Capsule(s) Inhalation daily  traZODone 150 milliGRAM(s) Oral at bedtime      MEDICATIONS  (PRN):  acetaminophen   Tablet .. 650 milliGRAM(s) Oral every 6 hours PRN Temp greater or equal to 38C (100.4F), Mild Pain (1 - 3)  ALBUTerol    90 MICROgram(s) HFA Inhaler 2 Puff(s) Inhalation every 6 hours PRN Shortness of Breath and/or Wheezing  guaiFENesin Oral Liquid (Sugar-Free) 200 milliGRAM(s) Oral every 6 hours PRN Cough  oxycodone    5 mG/acetaminophen 325 mG 2 Tablet(s) Oral every 6 hours PRN Severe Pain (7 - 10)       Medications up to date at time of exam.      PHYSICAL EXAMINATION:  Patient has no new complaints.  GENERAL: The patient is an obese white man , well-nourished, in no apparent distress.     Vital Signs Last 24 Hrs  T(C): 36.8 (23 Aug 2021 15:27), Max: 36.9 (23 Aug 2021 04:40)  T(F): 98.2 (23 Aug 2021 15:27), Max: 98.4 (23 Aug 2021 04:40)  HR: 89 (23 Aug 2021 15:27) (86 - 100)  BP: 112/69 (23 Aug 2021 15:27) (102/63 - 120/60)  BP(mean): --  RR: 18 (23 Aug 2021 15:27) (18 - 19)  SpO2: 97% (23 Aug 2021 15:27) (92% - 97%)   (if applicable)    Chest Tube (if applicable)    HEENT: Head is normocephalic and atraumatic. Extraocular muscles are intact. Mucous membranes are moist.     NECK: Supple, no palpable adenopathy.    LUNGS: Clear to auscultation, no wheezing, rales, or rhonchi.    HEART: Regular rate and rhythm without murmur.    ABDOMEN: Soft, nontender, and nondistended.  No hepatosplenomegaly is noted.    : No painful voiding, no pelvic pain    EXTREMITIES: Without any cyanosis, clubbing, rash, lesions or edema.    NEUROLOGIC: Awake, alert, oriented, grossly intact    SKIN: Warm, dry, good turgor.      LABS:                        12.2   8.86  )-----------( 216      ( 23 Aug 2021 08:20 )             40.1     08-23    139  |  95<L>  |  19<H>  ----------------------------<  295<H>  4.5   |  43<H>  |  0.56    Ca    8.7      23 Aug 2021 08:20  Phos  3.8     08-22  Mg     2.4     08-23          ABG - ( 22 Aug 2021 03:53 )  pH, Arterial: 7.44  pH, Blood: x     /  pCO2: 64    /  pO2: 67    / HCO3: 44    / Base Excess: 16.4  /  SaO2: 94                      Serum Pro-Brain Natriuretic Peptide: 382 pg/mL (08-21-21 @ 13:11)      Procalcitonin, Serum: 0.07 ng/mL (08-22-21 @ 08:19)      MICROBIOLOGY: (if applicable)    RADIOLOGY & ADDITIONAL STUDIES:  EKG:   CXR:  ECHO:    IMPRESSION: 61y Male PAST MEDICAL & SURGICAL HISTORY:  COPD (chronic obstructive pulmonary disease)  Oxygen 2.5L at home    Stented coronary artery  2017    HLD (hyperlipidemia)    Pulmonary HTN    Type 2 diabetes mellitus without complication, with long-term current use of insulin    Coronary artery disease involving native coronary artery of native heart without angina pectoris    Bipolar 1 disorder    Smoker    Gastroesophageal reflux disease, esophagitis presence not specified    Oxygen dependent    DM (diabetes mellitus)    CAD (coronary artery disease)    GERD (gastroesophageal reflux disease)    Insomnia    Polyarthritis    No significant past surgical history     p/w           IMP: 60 yo M from [facility] active smoker with  HTN, HLD, DM, CHF, CAD w stents chronic hypoxic resp failure due to  COPD requiring O2 supp @ 2.5L  pulmonary hypertension on BIPAP (12/5 35% non complaint) presented to the ED c/o SOB and leg swelling. Pat admitted for acute on chronic hypoxic hypercapnic resp failure requiring BiPaP support due to CHF.  Pat is afebrile, normal WBC and neg procalcitonin, CXR with b/l lower lungs infiltrate due to pulmonary edema . i doubt infectious processes         Sugg;  -continue BiPaP at night   -O2 supp to maintain sat >90%  -taper off solumedrol   -Symbicort   -Spiriva   -albuterol inhaler   -consider d/c  antibx   -diuresis   -advise to stop smoking  -nicotine patch   -monitor blood sugar with coverage   -dvt/ gi prophy  -out pat pulmonary f/u

## 2021-08-23 NOTE — DIETITIAN INITIAL EVALUATION ADULT. - FACTORS AFF FOOD INTAKE
acute on chronic comorbidities including CHF, COPD, Sepsis, Uncontrolled DM, Bipolar, GERD/Amish/ethnic/cultural/personal food preferences

## 2021-08-23 NOTE — DIETITIAN INITIAL EVALUATION ADULT. - PERTINENT LABORATORY DATA
08-23 Na139 mmol/L Glu 295 mg/dL<H> K+ 4.5 mmol/L Cr  0.56 mg/dL BUN 19 mg/dL<H>   08-22 Phos 3.8 mg/dL   08-21 Alb 2.8 g/dL<L>

## 2021-08-23 NOTE — PHYSICAL THERAPY INITIAL EVALUATION ADULT - GENERAL OBSERVATIONS, REHAB EVAL
pt tele referral, aerobic loss limiting distant locomotion, pt ambulatory with rw at bedside and baseline home 02

## 2021-08-23 NOTE — DIETITIAN INITIAL EVALUATION ADULT. - PERTINENT MEDS FT
MEDICATIONS  (STANDING):  ALPRAZolam 0.5 milliGRAM(s) Oral two times a day  artificial tears (preservative free) Ophthalmic Solution 1 Drop(s) Both EYES two times a day  aspirin  chewable 81 milliGRAM(s) Oral daily  azithromycin  IVPB 500 milliGRAM(s) IV Intermittent every 24 hours  budesonide 160 MICROgram(s)/formoterol 4.5 MICROgram(s) Inhaler 2 Puff(s) Inhalation two times a day  cefTRIAXone   IVPB      cefTRIAXone   IVPB 1000 milliGRAM(s) IV Intermittent every 24 hours  enoxaparin Injectable 40 milliGRAM(s) SubCutaneous daily  furosemide   Injectable 20 milliGRAM(s) IV Push daily  insulin lispro (ADMELOG) corrective regimen sliding scale   SubCutaneous three times a day before meals  insulin lispro (ADMELOG) corrective regimen sliding scale   SubCutaneous at bedtime  melatonin 3 milliGRAM(s) Oral at bedtime  methylPREDNISolone sodium succinate Injectable 40 milliGRAM(s) IV Push every 12 hours  multivitamin/minerals 1 Tablet(s) Oral daily  nicotine - 21 mG/24Hr(s) Patch 1 patch Transdermal daily  pantoprazole    Tablet 40 milliGRAM(s) Oral before breakfast  polyethylene glycol 3350 17 Gram(s) Oral daily  risperiDONE   Tablet 1 milliGRAM(s) Oral daily  senna 2 Tablet(s) Oral at bedtime  simethicone 80 milliGRAM(s) Chew daily  tiotropium 18 MICROgram(s) Capsule 1 Capsule(s) Inhalation daily  traZODone 150 milliGRAM(s) Oral at bedtime

## 2021-08-23 NOTE — DIETITIAN INITIAL EVALUATION ADULT. - PHYSCIAL ASSESSMENT
obese Pressure Injury: stage II   Wts in [place] EMR reviewed, a bit fluctuated, may due to scale/fluid variance, diuretic Rx

## 2021-08-24 LAB
ALBUMIN SERPL ELPH-MCNC: 2.5 G/DL — LOW (ref 3.5–5)
ALP SERPL-CCNC: 42 U/L — SIGNIFICANT CHANGE UP (ref 40–120)
ALT FLD-CCNC: 18 U/L DA — SIGNIFICANT CHANGE UP (ref 10–60)
ANION GAP SERPL CALC-SCNC: 0 MMOL/L — LOW (ref 5–17)
AST SERPL-CCNC: 10 U/L — SIGNIFICANT CHANGE UP (ref 10–40)
BILIRUB SERPL-MCNC: 0.2 MG/DL — SIGNIFICANT CHANGE UP (ref 0.2–1.2)
BUN SERPL-MCNC: 19 MG/DL — HIGH (ref 7–18)
CALCIUM SERPL-MCNC: 8.2 MG/DL — LOW (ref 8.4–10.5)
CHLORIDE SERPL-SCNC: 96 MMOL/L — SIGNIFICANT CHANGE UP (ref 96–108)
CO2 SERPL-SCNC: 44 MMOL/L — HIGH (ref 22–31)
CREAT SERPL-MCNC: 0.44 MG/DL — LOW (ref 0.5–1.3)
GLUCOSE BLDC GLUCOMTR-MCNC: 208 MG/DL — HIGH (ref 70–99)
GLUCOSE BLDC GLUCOMTR-MCNC: 217 MG/DL — HIGH (ref 70–99)
GLUCOSE BLDC GLUCOMTR-MCNC: 253 MG/DL — HIGH (ref 70–99)
GLUCOSE BLDC GLUCOMTR-MCNC: 260 MG/DL — HIGH (ref 70–99)
GLUCOSE SERPL-MCNC: 198 MG/DL — HIGH (ref 70–99)
HCT VFR BLD CALC: 37.7 % — LOW (ref 39–50)
HGB BLD-MCNC: 11.2 G/DL — LOW (ref 13–17)
MAGNESIUM SERPL-MCNC: 2.4 MG/DL — SIGNIFICANT CHANGE UP (ref 1.6–2.6)
MCHC RBC-ENTMCNC: 27.8 PG — SIGNIFICANT CHANGE UP (ref 27–34)
MCHC RBC-ENTMCNC: 29.7 GM/DL — LOW (ref 32–36)
MCV RBC AUTO: 93.5 FL — SIGNIFICANT CHANGE UP (ref 80–100)
NRBC # BLD: 0 /100 WBCS — SIGNIFICANT CHANGE UP (ref 0–0)
PHOSPHATE SERPL-MCNC: 2.7 MG/DL — SIGNIFICANT CHANGE UP (ref 2.5–4.5)
PLATELET # BLD AUTO: 193 K/UL — SIGNIFICANT CHANGE UP (ref 150–400)
POTASSIUM SERPL-MCNC: 4.1 MMOL/L — SIGNIFICANT CHANGE UP (ref 3.5–5.3)
POTASSIUM SERPL-SCNC: 4.1 MMOL/L — SIGNIFICANT CHANGE UP (ref 3.5–5.3)
PROT SERPL-MCNC: 5.2 G/DL — LOW (ref 6–8.3)
RBC # BLD: 4.03 M/UL — LOW (ref 4.2–5.8)
RBC # FLD: 14.2 % — SIGNIFICANT CHANGE UP (ref 10.3–14.5)
SARS-COV-2 RNA SPEC QL NAA+PROBE: SIGNIFICANT CHANGE UP
SODIUM SERPL-SCNC: 140 MMOL/L — SIGNIFICANT CHANGE UP (ref 135–145)
WBC # BLD: 6.43 K/UL — SIGNIFICANT CHANGE UP (ref 3.8–10.5)
WBC # FLD AUTO: 6.43 K/UL — SIGNIFICANT CHANGE UP (ref 3.8–10.5)

## 2021-08-24 RX ADMIN — OXYCODONE AND ACETAMINOPHEN 2 TABLET(S): 5; 325 TABLET ORAL at 13:56

## 2021-08-24 RX ADMIN — Medication 1 TABLET(S): at 11:39

## 2021-08-24 RX ADMIN — Medication 20 MILLIGRAM(S): at 05:34

## 2021-08-24 RX ADMIN — BUDESONIDE AND FORMOTEROL FUMARATE DIHYDRATE 2 PUFF(S): 160; 4.5 AEROSOL RESPIRATORY (INHALATION) at 22:45

## 2021-08-24 RX ADMIN — Medication 0.5 MILLIGRAM(S): at 05:34

## 2021-08-24 RX ADMIN — Medication 1 PATCH: at 11:10

## 2021-08-24 RX ADMIN — TIOTROPIUM BROMIDE 1 CAPSULE(S): 18 CAPSULE ORAL; RESPIRATORY (INHALATION) at 11:39

## 2021-08-24 RX ADMIN — ENOXAPARIN SODIUM 40 MILLIGRAM(S): 100 INJECTION SUBCUTANEOUS at 11:39

## 2021-08-24 RX ADMIN — RISPERIDONE 1 MILLIGRAM(S): 4 TABLET ORAL at 11:38

## 2021-08-24 RX ADMIN — OXYCODONE AND ACETAMINOPHEN 2 TABLET(S): 5; 325 TABLET ORAL at 22:45

## 2021-08-24 RX ADMIN — SENNA PLUS 2 TABLET(S): 8.6 TABLET ORAL at 21:20

## 2021-08-24 RX ADMIN — OXYCODONE AND ACETAMINOPHEN 2 TABLET(S): 5; 325 TABLET ORAL at 11:40

## 2021-08-24 RX ADMIN — Medication 1 DROP(S): at 17:37

## 2021-08-24 RX ADMIN — Medication 2: at 08:22

## 2021-08-24 RX ADMIN — Medication 0.5 MILLIGRAM(S): at 17:37

## 2021-08-24 RX ADMIN — OXYCODONE AND ACETAMINOPHEN 2 TABLET(S): 5; 325 TABLET ORAL at 21:17

## 2021-08-24 RX ADMIN — Medication 1 ENEMA: at 16:36

## 2021-08-24 RX ADMIN — Medication 150 MILLIGRAM(S): at 21:21

## 2021-08-24 RX ADMIN — BUDESONIDE AND FORMOTEROL FUMARATE DIHYDRATE 2 PUFF(S): 160; 4.5 AEROSOL RESPIRATORY (INHALATION) at 11:25

## 2021-08-24 RX ADMIN — PANTOPRAZOLE SODIUM 40 MILLIGRAM(S): 20 TABLET, DELAYED RELEASE ORAL at 06:11

## 2021-08-24 RX ADMIN — Medication 1 PATCH: at 11:39

## 2021-08-24 RX ADMIN — Medication 3 MILLIGRAM(S): at 21:17

## 2021-08-24 RX ADMIN — Medication 3: at 11:55

## 2021-08-24 RX ADMIN — Medication 81 MILLIGRAM(S): at 11:39

## 2021-08-24 RX ADMIN — ALBUTEROL 2 PUFF(S): 90 AEROSOL, METERED ORAL at 11:25

## 2021-08-24 RX ADMIN — Medication 1 PATCH: at 08:22

## 2021-08-24 RX ADMIN — SIMETHICONE 80 MILLIGRAM(S): 80 TABLET, CHEWABLE ORAL at 11:38

## 2021-08-24 RX ADMIN — Medication 1 DROP(S): at 05:34

## 2021-08-24 RX ADMIN — OXYCODONE AND ACETAMINOPHEN 2 TABLET(S): 5; 325 TABLET ORAL at 05:34

## 2021-08-24 RX ADMIN — Medication 3: at 16:36

## 2021-08-24 NOTE — PROGRESS NOTE ADULT - PROBLEM SELECTOR PLAN 2
Chest X ray Showed bilateral infiltrates and RML consolidation  - Oxygen supplementation as needed to maintain SpO2 of 88-92%  - S/p Levaquin 1 dose of in ED  - Procalcitonin is low, discontinue antibiotics   - Aspiration Precautions   - Supportive care and anti-tussives  - F/u Procalcitonin, Legionella Ag, strept Ag and Mycoplasma, RVP, MRSA PCR, B Cx  - Monitor for any signs of hemodynamic instability  - Pulm Dr Marquez consulted.
Chest X ray Showed bilateral infiltrates and RML consolidation  - Oxygen supplementation as needed to maintain SpO2 of 88-92%  - S/p Levaquin 1 dose of in ED  - Procalcitonin is low, discontinue antibiotics   - Aspiration Precautions   - Supportive care and anti-tussives  - F/u Procalcitonin, Legionella Ag, strept Ag and Mycoplasma, RVP, MRSA PCR, B Cx  - Monitor for any signs of hemodynamic instability  - Pulm Dr Marquez consulted.

## 2021-08-24 NOTE — PROGRESS NOTE ADULT - ASSESSMENT
60 yo M from [facility] has PMH of HTN, HLD, DM, CHF, CAD w stents COPD on 2.5L at home, pulmonary hypertension on BIPAP (12/5 35% non complaint) presented to the ED c/o SOB and leg swelling. Patient admitted  for suspected COPD exacerbation
62 yo M from [facility] has PMH of HTN, HLD, DM, CHF, CAD w stents COPD on 2.5L at home, pulmonary hypertension on BIPAP (12/5 35% non complaint) presented to the ED c/o SOB and leg swelling. Patient admitted for COPD exacerbation, PNA and concern for CHF exacerbation

## 2021-08-24 NOTE — PROGRESS NOTE ADULT - SUBJECTIVE AND OBJECTIVE BOX
Time of Visit:  Patient seen and examined.     MEDICATIONS  (STANDING):  ALPRAZolam 0.5 milliGRAM(s) Oral two times a day  artificial tears (preservative free) Ophthalmic Solution 1 Drop(s) Both EYES two times a day  aspirin  chewable 81 milliGRAM(s) Oral daily  budesonide 160 MICROgram(s)/formoterol 4.5 MICROgram(s) Inhaler 2 Puff(s) Inhalation two times a day  enoxaparin Injectable 40 milliGRAM(s) SubCutaneous daily  furosemide   Injectable 20 milliGRAM(s) IV Push daily  insulin lispro (ADMELOG) corrective regimen sliding scale   SubCutaneous three times a day before meals  insulin lispro (ADMELOG) corrective regimen sliding scale   SubCutaneous at bedtime  melatonin 3 milliGRAM(s) Oral at bedtime  multivitamin/minerals 1 Tablet(s) Oral daily  nicotine - 21 mG/24Hr(s) Patch 1 patch Transdermal daily  pantoprazole    Tablet 40 milliGRAM(s) Oral before breakfast  polyethylene glycol 3350 17 Gram(s) Oral daily  risperiDONE   Tablet 1 milliGRAM(s) Oral daily  senna 2 Tablet(s) Oral at bedtime  simethicone 80 milliGRAM(s) Chew daily  tiotropium 18 MICROgram(s) Capsule 1 Capsule(s) Inhalation daily  traZODone 150 milliGRAM(s) Oral at bedtime      MEDICATIONS  (PRN):  acetaminophen   Tablet .. 650 milliGRAM(s) Oral every 6 hours PRN Temp greater or equal to 38C (100.4F), Mild Pain (1 - 3)  ALBUTerol    90 MICROgram(s) HFA Inhaler 2 Puff(s) Inhalation every 6 hours PRN Shortness of Breath and/or Wheezing  guaiFENesin Oral Liquid (Sugar-Free) 200 milliGRAM(s) Oral every 6 hours PRN Cough  oxycodone    5 mG/acetaminophen 325 mG 2 Tablet(s) Oral every 6 hours PRN Severe Pain (7 - 10)       Medications up to date at time of exam.      PHYSICAL EXAMINATION:  Patient has no new complaints.  GENERAL: The patient is a well-developed, well-nourished, in no apparent distress.     Vital Signs Last 24 Hrs  T(C): 36.6 (24 Aug 2021 16:00), Max: 36.7 (24 Aug 2021 08:13)  T(F): 97.8 (24 Aug 2021 16:00), Max: 98 (24 Aug 2021 08:13)  HR: 92 (24 Aug 2021 16:00) (72 - 93)  BP: 109/53 (24 Aug 2021 16:00) (102/68 - 121/72)  BP(mean): --  RR: 18 (24 Aug 2021 16:00) (18 - 19)  SpO2: 98% (24 Aug 2021 16:00) (92% - 98%)   (if applicable)    Chest Tube (if applicable)    HEENT: Head is normocephalic and atraumatic. Extraocular muscles are intact. Mucous membranes are moist.     NECK: Supple, no palpable adenopathy.    LUNGS: Clear to auscultation, no wheezing, rales, or rhonchi.    HEART: Regular rate and rhythm without murmur.    ABDOMEN: Soft, nontender, and nondistended.  No hepatosplenomegaly is noted.    : No painful voiding, no pelvic pain    EXTREMITIES: Without any cyanosis, clubbing, rash, lesions or edema.    NEUROLOGIC: Awake, alert, oriented, grossly intact    SKIN: Warm, dry, good turgor.      LABS:                        11.2   6.43  )-----------( 193      ( 24 Aug 2021 06:25 )             37.7     08-24    140  |  96  |  19<H>  ----------------------------<  198<H>  4.1   |  44<H>  |  0.44<L>    Ca    8.2<L>      24 Aug 2021 06:25  Phos  2.7     08-24  Mg     2.4     08-24    TPro  5.2<L>  /  Alb  2.5<L>  /  TBili  0.2  /  DBili  x   /  AST  10  /  ALT  18  /  AlkPhos  42  08-24                    Procalcitonin, Serum: 0.07 ng/mL (08-22-21 @ 08:19)      MICROBIOLOGY: (if applicable)    RADIOLOGY & ADDITIONAL STUDIES:  EKG:   CXR:  ECHO:    IMPRESSION: 61y Male PAST MEDICAL & SURGICAL HISTORY:  COPD (chronic obstructive pulmonary disease)  Oxygen 2.5L at home    Stented coronary artery  2017    HLD (hyperlipidemia)    Pulmonary HTN    Type 2 diabetes mellitus without complication, with long-term current use of insulin    Coronary artery disease involving native coronary artery of native heart without angina pectoris    Bipolar 1 disorder    Smoker    Gastroesophageal reflux disease, esophagitis presence not specified    Oxygen dependent    DM (diabetes mellitus)    CAD (coronary artery disease)    GERD (gastroesophageal reflux disease)    Insomnia    Polyarthritis    No significant past surgical history     p/w           IMP: 60 yo M from [facility] active smoker with  HTN, HLD, DM, CHF, CAD w stents chronic hypoxic resp failure due to  COPD requiring O2 supp @ 2.5L  pulmonary hypertension on BIPAP (12/5 35% non complaint) presented to the ED c/o SOB and leg swelling. Pat admitted for acute on chronic hypoxic hypercapnic resp failure requiring BiPaP support due to CHF.  Pat is afebrile, normal WBC and neg procalcitonin, CXR with b/l lower lungs infiltrate due to pulmonary edema . i doubt infectious processes         Sugg;  -continue BiPaP at night   -O2 supp to maintain sat >90%  -taper off solumedrol   -Symbicort   -Spiriva   -albuterol inhaler   -consider d/c  antibx   -diuresis   -advise to stop smoking  -nicotine patch   -monitor blood sugar with coverage   -dvt/ gi prophy  -out pat pulmonary f/u

## 2021-08-24 NOTE — PROGRESS NOTE ADULT - PROBLEM SELECTOR PLAN 4
Plan: - Patient has history of Hypertension on Lasix at home   - Started on Lasix 20 mg daily - adjust based on clinical status   - DASH diet  - Monitor BP and adjust meds as needed.
Plan: - Patient has history of Hypertension on Lasix at home   - Started on Lasix 20 mg daily - adjust based on clinical status   - DASH diet  - Monitor BP and adjust meds as needed.

## 2021-08-24 NOTE — PROGRESS NOTE ADULT - PROBLEM SELECTOR PLAN 1
- Chest X ray Showed bilateral infiltrates and RML consolidation, ABG showed 7.46/70/50/98  - S/p Solumedrol 125 mg IV x 1, Duoneb, Levaquin in ED  - d/c abx procalcitonin is negative   - Albuterol 2 puffs q4hrs PRN  - Started on Spiriva qd and Symbicort   - C/w Solumedrol 40 mg qd TID, deescalate as tolerated, will taper steroids   - will start PO steroids   - Oxygen supplementation as needed to maintain SpO2 of 88-92%  - F/u sputum cx, Bcx, RVP panel
- Chest X ray Showed bilateral infiltrates and RML consolidation, ABG showed 7.46/70/50/98  - S/p Solumedrol 125 mg IV x 1, Duoneb, Levaquin in ED  - d/c abx procalcitonin is negative   - Albuterol 2 puffs q4hrs PRN  - Started on Spiriva qd and Symbicort   - C/w Solumedrol 40 mg qd TID, deescalate as tolerated, will taper steroids   - Oxygen supplementation as needed to maintain SpO2 of 88-92%  - F/u sputum cx, Bcx, RVP panel

## 2021-08-24 NOTE — PROGRESS NOTE ADULT - SUBJECTIVE AND OBJECTIVE BOX
C A R D I O L O G Y  **********************************     DATE OF SERVICE: 08-24-21    Patient denies chest pain or shortness of breath.   Review of symptoms otherwise negative.    acetaminophen   Tablet .. 650 milliGRAM(s) Oral every 6 hours PRN  ALBUTerol    90 MICROgram(s) HFA Inhaler 2 Puff(s) Inhalation every 6 hours PRN  ALPRAZolam 0.5 milliGRAM(s) Oral two times a day  artificial tears (preservative free) Ophthalmic Solution 1 Drop(s) Both EYES two times a day  aspirin  chewable 81 milliGRAM(s) Oral daily  budesonide 160 MICROgram(s)/formoterol 4.5 MICROgram(s) Inhaler 2 Puff(s) Inhalation two times a day  enoxaparin Injectable 40 milliGRAM(s) SubCutaneous daily  furosemide   Injectable 20 milliGRAM(s) IV Push daily  guaiFENesin Oral Liquid (Sugar-Free) 200 milliGRAM(s) Oral every 6 hours PRN  insulin lispro (ADMELOG) corrective regimen sliding scale   SubCutaneous three times a day before meals  insulin lispro (ADMELOG) corrective regimen sliding scale   SubCutaneous at bedtime  melatonin 3 milliGRAM(s) Oral at bedtime  multivitamin/minerals 1 Tablet(s) Oral daily  nicotine - 21 mG/24Hr(s) Patch 1 patch Transdermal daily  oxycodone    5 mG/acetaminophen 325 mG 2 Tablet(s) Oral every 6 hours PRN  pantoprazole    Tablet 40 milliGRAM(s) Oral before breakfast  polyethylene glycol 3350 17 Gram(s) Oral daily  risperiDONE   Tablet 1 milliGRAM(s) Oral daily  senna 2 Tablet(s) Oral at bedtime  simethicone 80 milliGRAM(s) Chew daily  tiotropium 18 MICROgram(s) Capsule 1 Capsule(s) Inhalation daily  traZODone 150 milliGRAM(s) Oral at bedtime                            11.2   6.43  )-----------( 193      ( 24 Aug 2021 06:25 )             37.7       Hemoglobin: 11.2 g/dL (08-24 @ 06:25)  Hemoglobin: 12.2 g/dL (08-23 @ 08:20)  Hemoglobin: 12.1 g/dL (08-22 @ 06:05)  Hemoglobin: 12.6 g/dL (08-21 @ 12:15)      08-24    140  |  96  |  19<H>  ----------------------------<  198<H>  4.1   |  44<H>  |  0.44<L>    Ca    8.2<L>      24 Aug 2021 06:25  Phos  2.7     08-24  Mg     2.4     08-24    TPro  5.2<L>  /  Alb  2.5<L>  /  TBili  0.2  /  DBili  x   /  AST  10  /  ALT  18  /  AlkPhos  42  08-24    Creatinine Trend: 0.44<--, 0.56<--, 0.62<--, 0.54<--, 0.72<--, 0.74<--    COAGS:     CARDIAC MARKERS ( 21 Aug 2021 13:11 )  0.017 ng/mL / x     / 26 U/L / x     / x            T(C): 36.4 (08-24-21 @ 11:09), Max: 36.8 (08-23-21 @ 15:27)  HR: 92 (08-24-21 @ 11:09) (72 - 93)  BP: 121/72 (08-24-21 @ 11:09) (102/68 - 121/72)  RR: 19 (08-24-21 @ 11:09) (18 - 19)  SpO2: 96% (08-24-21 @ 11:09) (92% - 98%)  Wt(kg): --    I&O's Summary    23 Aug 2021 07:01  -  24 Aug 2021 07:00  --------------------------------------------------------  IN: 300 mL / OUT: 3300 mL / NET: -3000 mL        Gen: Appears well in NAD  HEENT:  (-)icterus (-)pallor  CV: N S1 S2 1/6 MARCELA (+)2 Pulses B/l  Resp:  Clear to ausculatation B/L, shallow effort  GI: (+) BS Soft, NT, ND  Lymph:  (-)Edema, (-)obvious lymphadenopathy  Skin: Warm to touch, Normal turgor  Psych: Appropriate mood and affect      TELEMETRY: 	 sinus         ASSESSMENT/PLAN: 	61y  Male from [facility] has PMH of HTN, HLD, DM, CAD (normal LVEF) w stents COPD on 2.5L at home, pulmonary hypertension on BIPAP (12/5 35% non complaint) presented to the ED c/o SOB and leg swelling.  Pt with mixed picture of copd excab    - Demonstrating sequale of RV dysfx and cor pulmonale physiology  - Pulm f/u  - Can keep net negative to unload R and decrease PA pressures      Charli Mendes MD, [facility]  BEEPER [phone]

## 2021-08-24 NOTE — PROGRESS NOTE ADULT - SUBJECTIVE AND OBJECTIVE BOX
PGY1 Progress Note discussed with attending     PAGER #: [phone]] TILL 5:00 PM  PLEASE CONTACT ON CALL TEAM:  - On Call Team (Please refer to Caty) FROM 5:00 PM - 8:30PM  - Nightfloat Team FROM 8:30 -7:30 AM    CHIEF COMPLAINT & BRIEF HOSPITAL COURSE:    INTERVAL HPI/OVERNIGHT EVENTS:  No acute events overnight.  KATHARINA Gonsalves @5PM       REVIEW OF SYSTEMS:  CONSTITUTIONAL: No fever, weight loss, or fatigue  RESPIRATORY: No cough, wheezing, chills or hemoptysis; No shortness of breath  CARDIOVASCULAR: No chest pain, palpitations, dizziness, or leg swelling  GASTROINTESTINAL: No abdominal pain. No nausea, vomiting, or hematemesis; No diarrhea or constipation. No melena or hematochezia.  GENITOURINARY: No dysuria or hematuria, urinary frequency  NEUROLOGICAL: No headaches, memory loss, loss of strength, numbness, or tremors  SKIN: No itching, burning, rashes, or lesions     Vital Signs Last 24 Hrs  T(C): 36.6 (24 Aug 2021 16:00), Max: 36.7 (24 Aug 2021 08:13)  T(F): 97.8 (24 Aug 2021 16:00), Max: 98 (24 Aug 2021 08:13)  HR: 92 (24 Aug 2021 16:00) (72 - 93)  BP: 109/53 (24 Aug 2021 16:00) (102/68 - 121/72)  BP(mean): --  RR: 18 (24 Aug 2021 16:00) (18 - 19)  SpO2: 98% (24 Aug 2021 16:00) (92% - 98%)    PHYSICAL EXAMINATION:  GENERAL: NAD, well built  HEAD:  Atraumatic, Normocephalic  EYES:  conjunctiva and sclera clear  NECK: Supple, No JVD, Normal thyroid  CHEST/LUNG: Clear to auscultation. Clear to percussion bilaterally; No rales, rhonchi, wheezing, or rubs  HEART: Regular rate and rhythm; No murmurs, rubs, or gallops  ABDOMEN: Soft, Nontender, Nondistended; Bowel sounds present  NERVOUS SYSTEM:  Alert & Oriented X3,    EXTREMITIES:  2+ Peripheral Pulses, No clubbing, cyanosis, or edema  SKIN: warm dry                          11.2   6.43  )-----------( 193      ( 24 Aug 2021 06:25 )             37.7     08-24    140  |  96  |  19<H>  ----------------------------<  198<H>  4.1   |  44<H>  |  0.44<L>    Ca    8.2<L>      24 Aug 2021 06:25  Phos  2.7     08-24  Mg     2.4     08-24    TPro  5.2<L>  /  Alb  2.5<L>  /  TBili  0.2  /  DBili  x   /  AST  10  /  ALT  18  /  AlkPhos  42  08-24    LIVER FUNCTIONS - ( 24 Aug 2021 06:25 )  Alb: 2.5 g/dL / Pro: 5.2 g/dL / ALK PHOS: 42 U/L / ALT: 18 U/L DA / AST: 10 U/L / GGT: x                   CAPILLARY BLOOD GLUCOSE      RADIOLOGY & ADDITIONAL TESTS:

## 2021-08-24 NOTE — PROGRESS NOTE ADULT - PROBLEM SELECTOR PLAN 5
Plan: - Patient takes insulin at home  - Started on HSS  - Fingerstick before meals and at bedtime  - DASH diet with consistent carb  - [employer] -180  - A1c 6.5 on 7/2021.
Plan: - Patient takes insulin at home  - Started on HSS  - Fingerstick before meals and at bedtime  - DASH diet with consistent carb  - [employer] -180  - A1c 6.5 on 7/2021.

## 2021-08-24 NOTE — PROGRESS NOTE ADULT - ATTENDING COMMENTS
HPI:  60 yo M from [facility] has PMH of HTN, HLD, DM, CHF, CAD w stents COPD on 2.5L at home, pulmonary hypertension on BIPAP (12/5 35% non complaint) presented to the ED c/o SOB and leg swelling. Of note, patient was admitted to ICU for Acute on chronic hypercapnic respiratory failure requiring intubation for airway protection, pressors and antibiotics. As per patient, he is active smoker and understands that his multiple admissions are related to his smoking however not ready to stop. Patient denies any chest pain, palpitations, dizziness or other complaints.       [facility] full code    (21 Aug 2021 15:40)    # D/C PLAN TO [facility], NOTED PT EVAL    # D/C PLAN TO [facility] - WILL D/W [facility] REGARDING TRILOGY DEVICE; REVIEWED PT NOTE  - CASE D/W SISTER, GRETTA MITCHELL @ [phone]  # ACUTE ON CHRONIC HYPOXIC HYPERCAPNIC RESPIRATORY FAILURE - S/T SUSPECTED CHF EXACERBATION [W/ UNDERLYING RV DYSFXN AND COR PULMONALE] VS. FLASH PULMONARY EDEMA W/ VOLUME OVERLOAD S/T URINARY RETENTION - IMPROVED ON BIPAP  - UNDERLYING EMPHYSEMA, MORBIDLY OBESE WITH RESTRICTIVE LUNG DISEASE, SUSPECT CYNTHIA, COPD ON HOME 2LNC  - ON SOLUMEDROL - WILL TAPER TO PREDNISONE  - CONTINUE SYMBICORT AND SPIRIVA  - ON IV LASIX, STRICT IS AND OS  - D/C ROCEPHIN + AZITHROMYCIN, PROCAL IS LOW   - COUNSELLED ON TOBACCO CESSATION > 10 MINS, COUNSELLING ON BIPAP COMPLIANCE   - PULMONARY CONSULT, CARDIOLOGY CONSULT    # URINARY RETENTION   - TOV TODAY    # CAD S/P STENTS  # HTN  # DM TYPE 2  # HLD  # CHRONIC LOW BACK ACHE AND PANIC DISORDER  # GI AND DVT PPX    NAEEM CABELLO MD COVERING ADRIANA CABELLO MD
HPI:  60 yo M from [facility] has PMH of HTN, HLD, DM, CHF, CAD w stents COPD on 2.5L at home, pulmonary hypertension on BIPAP (12/5 35% non complaint) presented to the ED c/o SOB and leg swelling. Of note, patient was admitted to ICU for Acute on chronic hypercapnic respiratory failure requiring intubation for airway protection, pressors and antibiotics. As per patient, he is active smoker and understands that his multiple admissions are related to his smoking however not ready to stop. Patient denies any chest pain, palpitations, dizziness or other complaints.       [facility] full code    (21 Aug 2021 15:40)    # D/C PLAN TO [facility], NOTED PT EVAL    # ACUTE ON CHRONIC HYPOXIC HYPERCAPNIC RESPIRATORY FAILURE - S/T SUSPECTED CHF EXACERBATION [W/ UNDERLYING RV DYSFXN AND COR PULMONALE] VS. FLASH PULMONARY EDEMA W/ VOLUME OVERLOAD S/T URINARY RETENTION - IMPROVED ON BIPAP  - UNDERLYING EMPHYSEMA, MORBIDLY OBESE WITH RESTRICTIVE LUNG DISEASE, SUSPECT CYNTHIA, COPD ON HOME 2LNC  - ON SOLUMEDROL - WILL TAPER  - CONTINUE SYMBICORT AND SPIRIVA  - ON IV LASIX, STRICT IS AND OS  - D/C ROCEPHIN + AZITHROMYCIN, PROCAL IS LOW   - COUNSELLED ON TOBACCO CESSATION > 10 MINS, COUNSELLING ON BIPAP COMPLIANCE   - PULMONARY CONSULT, CARDIOLOGY CONSULT    # URINARY RETENTION - W/ AMOR  - F/U RENAL U/S  - TOV TODAY    # CAD S/P STENTS  # HTN  # DM TYPE 2  # HLD  # CHRONIC LOW BACK ACHE AND PANIC DISORDER  # GI AND DVT PPX    NAEEM CABELLO MD COVERING ADRIANA CABELLO MD

## 2021-08-24 NOTE — PROGRESS NOTE ADULT - PROBLEM SELECTOR PLAN 3
Showed bilateral infiltrates and RML consolidation, ABG showed 7.46/70/50/98  - Pro , Troponin x1 negative   - EKG IRBBB, sinus tachycardia   - Last Echo showed EF >55%, moderate pulmonary hypertension   - C/w home meds   - Started on IV Lasix 20 mg qd, ASA   - Consider starting ACEI or ARB as tolerated   - Strict I & O, Daily weight, Restrict fluids to 1 L  - Tele monitor  - Cardio Dr Mendes consulted.
Showed bilateral infiltrates and RML consolidation, ABG showed 7.46/70/50/98  - Pro , Troponin x1 negative   - EKG IRBBB, sinus tachycardia   - Last Echo showed EF >55%, moderate pulmonary hypertension   - C/w home meds   - Started on IV Lasix 20 mg qd, ASA   - Consider starting ACEI or ARB as tolerated   - Strict I & O, Daily weight, Restrict fluids to 1 L  - Tele monitor  - Cardio Dr Mendes consulted.

## 2021-08-25 ENCOUNTER — TRANSCRIPTION ENCOUNTER (OUTPATIENT)
Age: 61
End: 2021-08-25

## 2021-08-25 VITALS
HEART RATE: 86 BPM | DIASTOLIC BLOOD PRESSURE: 72 MMHG | RESPIRATION RATE: 17 BRPM | TEMPERATURE: 97 F | OXYGEN SATURATION: 96 % | SYSTOLIC BLOOD PRESSURE: 119 MMHG

## 2021-08-25 DIAGNOSIS — A41.9 SEPSIS, UNSPECIFIED ORGANISM: ICD-10-CM

## 2021-08-25 LAB
ALBUMIN SERPL ELPH-MCNC: 3.1 G/DL — LOW (ref 3.5–5)
ALP SERPL-CCNC: 58 U/L — SIGNIFICANT CHANGE UP (ref 40–120)
ALT FLD-CCNC: 32 U/L DA — SIGNIFICANT CHANGE UP (ref 10–60)
ANION GAP SERPL CALC-SCNC: 1 MMOL/L — LOW (ref 5–17)
AST SERPL-CCNC: 12 U/L — SIGNIFICANT CHANGE UP (ref 10–40)
BILIRUB SERPL-MCNC: 0.4 MG/DL — SIGNIFICANT CHANGE UP (ref 0.2–1.2)
BUN SERPL-MCNC: 19 MG/DL — HIGH (ref 7–18)
CALCIUM SERPL-MCNC: 8.6 MG/DL — SIGNIFICANT CHANGE UP (ref 8.4–10.5)
CHLORIDE SERPL-SCNC: 93 MMOL/L — LOW (ref 96–108)
CO2 SERPL-SCNC: 42 MMOL/L — HIGH (ref 22–31)
CREAT SERPL-MCNC: 0.61 MG/DL — SIGNIFICANT CHANGE UP (ref 0.5–1.3)
GLUCOSE BLDC GLUCOMTR-MCNC: 273 MG/DL — HIGH (ref 70–99)
GLUCOSE BLDC GLUCOMTR-MCNC: 278 MG/DL — HIGH (ref 70–99)
GLUCOSE BLDC GLUCOMTR-MCNC: 335 MG/DL — HIGH (ref 70–99)
GLUCOSE SERPL-MCNC: 340 MG/DL — HIGH (ref 70–99)
HCT VFR BLD CALC: 41.7 % — SIGNIFICANT CHANGE UP (ref 39–50)
HGB BLD-MCNC: 12.6 G/DL — LOW (ref 13–17)
MAGNESIUM SERPL-MCNC: 2.1 MG/DL — SIGNIFICANT CHANGE UP (ref 1.6–2.6)
MCHC RBC-ENTMCNC: 28.3 PG — SIGNIFICANT CHANGE UP (ref 27–34)
MCHC RBC-ENTMCNC: 30.2 GM/DL — LOW (ref 32–36)
MCV RBC AUTO: 93.5 FL — SIGNIFICANT CHANGE UP (ref 80–100)
NRBC # BLD: 0 /100 WBCS — SIGNIFICANT CHANGE UP (ref 0–0)
PHOSPHATE SERPL-MCNC: 4 MG/DL — SIGNIFICANT CHANGE UP (ref 2.5–4.5)
PLATELET # BLD AUTO: 170 K/UL — SIGNIFICANT CHANGE UP (ref 150–400)
POTASSIUM SERPL-MCNC: 5.4 MMOL/L — HIGH (ref 3.5–5.3)
POTASSIUM SERPL-SCNC: 5.4 MMOL/L — HIGH (ref 3.5–5.3)
PROT SERPL-MCNC: 6.1 G/DL — SIGNIFICANT CHANGE UP (ref 6–8.3)
RBC # BLD: 4.46 M/UL — SIGNIFICANT CHANGE UP (ref 4.2–5.8)
RBC # FLD: 14.1 % — SIGNIFICANT CHANGE UP (ref 10.3–14.5)
SODIUM SERPL-SCNC: 136 MMOL/L — SIGNIFICANT CHANGE UP (ref 135–145)
WBC # BLD: 9.13 K/UL — SIGNIFICANT CHANGE UP (ref 3.8–10.5)
WBC # FLD AUTO: 9.13 K/UL — SIGNIFICANT CHANGE UP (ref 3.8–10.5)

## 2021-08-25 PROCEDURE — 87641 MR-STAPH DNA AMP PROBE: CPT

## 2021-08-25 PROCEDURE — 83735 ASSAY OF MAGNESIUM: CPT

## 2021-08-25 PROCEDURE — 94760 N-INVAS EAR/PLS OXIMETRY 1: CPT

## 2021-08-25 PROCEDURE — 93005 ELECTROCARDIOGRAM TRACING: CPT

## 2021-08-25 PROCEDURE — 85610 PROTHROMBIN TIME: CPT

## 2021-08-25 PROCEDURE — 94660 CPAP INITIATION&MGMT: CPT

## 2021-08-25 PROCEDURE — 81001 URINALYSIS AUTO W/SCOPE: CPT

## 2021-08-25 PROCEDURE — 82550 ASSAY OF CK (CPK): CPT

## 2021-08-25 PROCEDURE — 85730 THROMBOPLASTIN TIME PARTIAL: CPT

## 2021-08-25 PROCEDURE — 84145 PROCALCITONIN (PCT): CPT

## 2021-08-25 PROCEDURE — 83880 ASSAY OF NATRIURETIC PEPTIDE: CPT

## 2021-08-25 PROCEDURE — 87635 SARS-COV-2 COVID-19 AMP PRB: CPT

## 2021-08-25 PROCEDURE — 84100 ASSAY OF PHOSPHORUS: CPT

## 2021-08-25 PROCEDURE — 84484 ASSAY OF TROPONIN QUANT: CPT

## 2021-08-25 PROCEDURE — 71045 X-RAY EXAM CHEST 1 VIEW: CPT

## 2021-08-25 PROCEDURE — 80048 BASIC METABOLIC PNL TOTAL CA: CPT

## 2021-08-25 PROCEDURE — 99285 EMERGENCY DEPT VISIT HI MDM: CPT | Mod: 25

## 2021-08-25 PROCEDURE — 94640 AIRWAY INHALATION TREATMENT: CPT

## 2021-08-25 PROCEDURE — 36415 COLL VENOUS BLD VENIPUNCTURE: CPT

## 2021-08-25 PROCEDURE — 85027 COMPLETE CBC AUTOMATED: CPT

## 2021-08-25 PROCEDURE — 83605 ASSAY OF LACTIC ACID: CPT

## 2021-08-25 PROCEDURE — 97161 PT EVAL LOW COMPLEX 20 MIN: CPT

## 2021-08-25 PROCEDURE — 85025 COMPLETE CBC W/AUTO DIFF WBC: CPT

## 2021-08-25 PROCEDURE — 87086 URINE CULTURE/COLONY COUNT: CPT

## 2021-08-25 PROCEDURE — 87040 BLOOD CULTURE FOR BACTERIA: CPT

## 2021-08-25 PROCEDURE — 87640 STAPH A DNA AMP PROBE: CPT

## 2021-08-25 PROCEDURE — 82962 GLUCOSE BLOOD TEST: CPT

## 2021-08-25 PROCEDURE — 99291 CRITICAL CARE FIRST HOUR: CPT

## 2021-08-25 PROCEDURE — 86769 SARS-COV-2 COVID-19 ANTIBODY: CPT

## 2021-08-25 PROCEDURE — 82803 BLOOD GASES ANY COMBINATION: CPT

## 2021-08-25 PROCEDURE — 80053 COMPREHEN METABOLIC PANEL: CPT

## 2021-08-25 RX ORDER — SODIUM ZIRCONIUM CYCLOSILICATE 10 G/10G
10 POWDER, FOR SUSPENSION ORAL ONCE
Refills: 0 | Status: COMPLETED | OUTPATIENT
Start: 2021-08-25 | End: 2021-08-25

## 2021-08-25 RX ADMIN — Medication 1 PATCH: at 11:37

## 2021-08-25 RX ADMIN — OXYCODONE AND ACETAMINOPHEN 2 TABLET(S): 5; 325 TABLET ORAL at 09:06

## 2021-08-25 RX ADMIN — TIOTROPIUM BROMIDE 1 CAPSULE(S): 18 CAPSULE ORAL; RESPIRATORY (INHALATION) at 11:42

## 2021-08-25 RX ADMIN — RISPERIDONE 1 MILLIGRAM(S): 4 TABLET ORAL at 11:41

## 2021-08-25 RX ADMIN — Medication 81 MILLIGRAM(S): at 11:39

## 2021-08-25 RX ADMIN — SIMETHICONE 80 MILLIGRAM(S): 80 TABLET, CHEWABLE ORAL at 11:40

## 2021-08-25 RX ADMIN — Medication 1 PATCH: at 20:07

## 2021-08-25 RX ADMIN — OXYCODONE AND ACETAMINOPHEN 2 TABLET(S): 5; 325 TABLET ORAL at 07:23

## 2021-08-25 RX ADMIN — Medication 3: at 16:43

## 2021-08-25 RX ADMIN — Medication 20 MILLIGRAM(S): at 05:51

## 2021-08-25 RX ADMIN — Medication 40 MILLIGRAM(S): at 00:39

## 2021-08-25 RX ADMIN — OXYCODONE AND ACETAMINOPHEN 2 TABLET(S): 5; 325 TABLET ORAL at 14:10

## 2021-08-25 RX ADMIN — Medication 1 DROP(S): at 05:52

## 2021-08-25 RX ADMIN — Medication 1 PATCH: at 12:48

## 2021-08-25 RX ADMIN — Medication 3: at 12:28

## 2021-08-25 RX ADMIN — OXYCODONE AND ACETAMINOPHEN 2 TABLET(S): 5; 325 TABLET ORAL at 15:46

## 2021-08-25 RX ADMIN — BUDESONIDE AND FORMOTEROL FUMARATE DIHYDRATE 2 PUFF(S): 160; 4.5 AEROSOL RESPIRATORY (INHALATION) at 11:37

## 2021-08-25 RX ADMIN — POLYETHYLENE GLYCOL 3350 17 GRAM(S): 17 POWDER, FOR SOLUTION ORAL at 11:38

## 2021-08-25 RX ADMIN — PANTOPRAZOLE SODIUM 40 MILLIGRAM(S): 20 TABLET, DELAYED RELEASE ORAL at 07:00

## 2021-08-25 RX ADMIN — Medication 4: at 08:41

## 2021-08-25 RX ADMIN — Medication 1 DROP(S): at 17:30

## 2021-08-25 RX ADMIN — Medication 0.5 MILLIGRAM(S): at 17:29

## 2021-08-25 RX ADMIN — ENOXAPARIN SODIUM 40 MILLIGRAM(S): 100 INJECTION SUBCUTANEOUS at 11:38

## 2021-08-25 RX ADMIN — Medication 0.5 MILLIGRAM(S): at 05:51

## 2021-08-25 RX ADMIN — Medication 1 TABLET(S): at 11:39

## 2021-08-25 RX ADMIN — Medication 1 PATCH: at 09:05

## 2021-08-25 RX ADMIN — SODIUM ZIRCONIUM CYCLOSILICATE 10 GRAM(S): 10 POWDER, FOR SUSPENSION ORAL at 11:37

## 2021-08-25 NOTE — DISCHARGE NOTE PROVIDER - NSDCCPCAREPLAN_GEN_ALL_CORE_FT
PRINCIPAL DISCHARGE DIAGNOSIS  Diagnosis: Acute on chronic respiratory failure with hypoxia and hypercapnia  Assessment and Plan of Treatment: Continue oxygen as pescribed. You need to BIPAP at night settings: 15/8 fio2 40%. You need to use the BIPAP for at least 6 to 8 hours a night. When dischared from rehab back to [facility] you need to use your Trilogy machine nightly and as needed during the day.      SECONDARY DISCHARGE DIAGNOSES  Diagnosis: COPD exacerbation  Assessment and Plan of Treatment: Continue oxygen support. BIPAP at night and as needed during the day.  Continue inhalers as ordered. Make sure you take your prednisone as prescribed. Make sure you rinse mouth out after using your inhalers. You need to stop smoking.    Diagnosis: CHF, acute on chronic  Assessment and Plan of Treatment: Continue taking medications as ordered. Limit the amount of salt in your diet.    Diagnosis: Pneumonia  Assessment and Plan of Treatment: You were treated with IV antibiotics while in the hospital. You may be taking antibiotics when discharged from the hospital.    Diagnosis: Diabetes mellitus  Assessment and Plan of Treatment: Continue taking medication as ordered. Have your glucose monitored while in rehab. As steroids are tapered you may need less medications.    Diagnosis: Nicotine addiction  Assessment and Plan of Treatment: Continue Nicotine patch as ordered. You need to stop smoking    Diagnosis: Bipolar disorder  Assessment and Plan of Treatment: Continue taking medications as prescribed. Avoid drinking alcohol.    Diagnosis: Advance care planning  Assessment and Plan of Treatment:     Diagnosis: Acute hypercapnic respiratory failure  Assessment and Plan of Treatment:     Diagnosis: Sepsis with acute hypoxic respiratory failure without septic shock, due to unspecified organism  Assessment and Plan of Treatment:

## 2021-08-25 NOTE — PROGRESS NOTE ADULT - PROVIDER SPECIALTY LIST ADULT
Cardiology
Pulmonology
Pulmonology
Cardiology
Internal Medicine
Pulmonology
Pulmonology
Cardiology
Internal Medicine
Internal Medicine

## 2021-08-25 NOTE — DISCHARGE NOTE NURSING/CASE MANAGEMENT/SOCIAL WORK - PATIENT PORTAL LINK FT
You can access the FollowMyHealth Patient Portal offered by [facility] by registering at the following website: [email or web address]. By joining [employer]’s FollowMyHealth portal, you will also be able to view your health information using other applications (apps) compatible with our system.

## 2021-08-25 NOTE — DISCHARGE NOTE NURSING/CASE MANAGEMENT/SOCIAL WORK - NSDCPEWEB_GEN_ALL_CORE
[facility] for Tobacco Control website --- [email or web address] website --- [email or web address]

## 2021-08-25 NOTE — PROGRESS NOTE ADULT - SUBJECTIVE AND OBJECTIVE BOX
C A R D I O L O G Y  **********************************     DATE OF SERVICE: 08-25-21    Patient denies chest pain or shortness of breath.   Review of symptoms otherwise negative.    acetaminophen   Tablet .. 650 milliGRAM(s) Oral every 6 hours PRN  ALBUTerol    90 MICROgram(s) HFA Inhaler 2 Puff(s) Inhalation every 6 hours PRN  ALPRAZolam 0.5 milliGRAM(s) Oral two times a day  artificial tears (preservative free) Ophthalmic Solution 1 Drop(s) Both EYES two times a day  aspirin  chewable 81 milliGRAM(s) Oral daily  budesonide 160 MICROgram(s)/formoterol 4.5 MICROgram(s) Inhaler 2 Puff(s) Inhalation two times a day  enoxaparin Injectable 40 milliGRAM(s) SubCutaneous daily  furosemide   Injectable 20 milliGRAM(s) IV Push daily  guaiFENesin Oral Liquid (Sugar-Free) 200 milliGRAM(s) Oral every 6 hours PRN  insulin lispro (ADMELOG) corrective regimen sliding scale   SubCutaneous three times a day before meals  insulin lispro (ADMELOG) corrective regimen sliding scale   SubCutaneous at bedtime  melatonin 3 milliGRAM(s) Oral at bedtime  multivitamin/minerals 1 Tablet(s) Oral daily  nicotine - 21 mG/24Hr(s) Patch 1 patch Transdermal daily  oxycodone    5 mG/acetaminophen 325 mG 2 Tablet(s) Oral every 6 hours PRN  pantoprazole    Tablet 40 milliGRAM(s) Oral before breakfast  polyethylene glycol 3350 17 Gram(s) Oral daily  risperiDONE   Tablet 1 milliGRAM(s) Oral daily  senna 2 Tablet(s) Oral at bedtime  simethicone 80 milliGRAM(s) Chew daily  sodium zirconium cyclosilicate 10 Gram(s) Oral once  tiotropium 18 MICROgram(s) Capsule 1 Capsule(s) Inhalation daily  traZODone 150 milliGRAM(s) Oral at bedtime                            12.6   9.13  )-----------( 170      ( 25 Aug 2021 06:58 )             41.7       Hemoglobin: 12.6 g/dL (08-25 @ 06:58)  Hemoglobin: 11.2 g/dL (08-24 @ 06:25)  Hemoglobin: 12.2 g/dL (08-23 @ 08:20)  Hemoglobin: 12.1 g/dL (08-22 @ 06:05)  Hemoglobin: 12.6 g/dL (08-21 @ 12:15)      08-25    136  |  93<L>  |  19<H>  ----------------------------<  340<H>  5.4<H>   |  42<H>  |  0.61    Ca    8.6      25 Aug 2021 06:58  Phos  4.0     08-25  Mg     2.1     08-25    TPro  6.1  /  Alb  3.1<L>  /  TBili  0.4  /  DBili  x   /  AST  12  /  ALT  32  /  AlkPhos  58  08-25    Creatinine Trend: 0.61<--, 0.44<--, 0.56<--, 0.62<--, 0.54<--, 0.72<--    COAGS:           T(C): 36.4 (08-25-21 @ 05:45), Max: 36.6 (08-24-21 @ 16:00)  HR: 93 (08-25-21 @ 08:10) (90 - 99)  BP: 129/79 (08-25-21 @ 05:45) (109/53 - 129/79)  RR: 18 (08-25-21 @ 05:45) (18 - 20)  SpO2: 95% (08-25-21 @ 08:10) (94% - 98%)  Wt(kg): --    I&O's Summary    24 Aug 2021 07:01  -  25 Aug 2021 07:00  --------------------------------------------------------  IN: 0 mL / OUT: 300 mL / NET: -300 mL        Gen: Appears well in NAD  HEENT:  (-)icterus (-)pallor  CV: N S1 S2 1/6 MARCELA (+)2 Pulses B/l  Resp:  Clear to ausculatation B/L, shallow effort  GI: (+) BS Soft, NT, ND  Lymph:  (-)Edema, (-)obvious lymphadenopathy  Skin: Warm to touch, Normal turgor  Psych: Appropriate mood and affect      TELEMETRY: 	 sinus         ASSESSMENT/PLAN: 	61y  Male from [facility] has PMH of HTN, HLD, DM, CAD (normal LVEF) w stents COPD on 2.5L at home, pulmonary hypertension on BIPAP (12/5 35% non complaint) presented to the ED c/o SOB and leg swelling.   copd excab, cor pulmonale physiology    - Demonstrating sequale of RV dysfx and cor pulmonale physiology  - Pulm f/u appreciated  - can change lasix to 40 mg PO daily       Charli Mendes MD, [facility]  BEEPER [phone]

## 2021-08-25 NOTE — DISCHARGE NOTE NURSING/CASE MANAGEMENT/SOCIAL WORK - NSDCPEEMAIL_GEN_ALL_CORE
[facility] for Tobacco Control email [email or web address]

## 2021-08-25 NOTE — DISCHARGE NOTE PROVIDER - HOSPITAL COURSE
62 yo M from [facility] has PMH of HTN, HLD, DM, CHF, CAD w stents COPD on 2.5L at home, pulmonary hypertension on BIPAP (12/5 35% non complaint) presented to the ED c/o SOB and leg swelling. Patient admitted  for suspected COPD exacerbation. Patient has Trilogy at [facility] for which he is noncompliant with. 60 yo M from [facility] Osiel Ashby has PMH of HTN, HLD, DM, CHF, CAD w stents COPD on 2.5L at home, pulmonary hypertension on BIPAP (12/5 35% non complaint) presented to the ED c/o SOB and leg swelling. Patient admitted  for suspected COPD exacerbation. Patient has Trilogy at [facility] for which he is noncompliant with.  < from: Xray Chest 1 View-PORTABLE IMMEDIATE (08.21.21 @ 11:57) >      Rather prominent central pulmonary arteries suggest pulmonary hypertension.    Present film shows mild basal infiltrates new since July 28, 2021.    Follow-up AP chest, semierect, on August 21, 2021 at 12:00PM.    Chest unchanged.    IMPRESSION: Prominent emre suggesting pulmonary hypertension and bibasilar infiltrates are noted.    < end of copied text >

## 2021-08-25 NOTE — PROGRESS NOTE ADULT - SUBJECTIVE AND OBJECTIVE BOX
Time of Visit:  Patient seen and examined.     MEDICATIONS  (STANDING):  ALPRAZolam 0.5 milliGRAM(s) Oral two times a day  artificial tears (preservative free) Ophthalmic Solution 1 Drop(s) Both EYES two times a day  aspirin  chewable 81 milliGRAM(s) Oral daily  budesonide 160 MICROgram(s)/formoterol 4.5 MICROgram(s) Inhaler 2 Puff(s) Inhalation two times a day  enoxaparin Injectable 40 milliGRAM(s) SubCutaneous daily  furosemide   Injectable 20 milliGRAM(s) IV Push daily  insulin lispro (ADMELOG) corrective regimen sliding scale   SubCutaneous three times a day before meals  insulin lispro (ADMELOG) corrective regimen sliding scale   SubCutaneous at bedtime  melatonin 3 milliGRAM(s) Oral at bedtime  multivitamin/minerals 1 Tablet(s) Oral daily  nicotine - 21 mG/24Hr(s) Patch 1 patch Transdermal daily  pantoprazole    Tablet 40 milliGRAM(s) Oral before breakfast  polyethylene glycol 3350 17 Gram(s) Oral daily  risperiDONE   Tablet 1 milliGRAM(s) Oral daily  senna 2 Tablet(s) Oral at bedtime  simethicone 80 milliGRAM(s) Chew daily  tiotropium 18 MICROgram(s) Capsule 1 Capsule(s) Inhalation daily  traZODone 150 milliGRAM(s) Oral at bedtime      MEDICATIONS  (PRN):  acetaminophen   Tablet .. 650 milliGRAM(s) Oral every 6 hours PRN Temp greater or equal to 38C (100.4F), Mild Pain (1 - 3)  ALBUTerol    90 MICROgram(s) HFA Inhaler 2 Puff(s) Inhalation every 6 hours PRN Shortness of Breath and/or Wheezing  guaiFENesin Oral Liquid (Sugar-Free) 200 milliGRAM(s) Oral every 6 hours PRN Cough  oxycodone    5 mG/acetaminophen 325 mG 2 Tablet(s) Oral every 6 hours PRN Severe Pain (7 - 10)       Medications up to date at time of exam.      PHYSICAL EXAMINATION:  Patient has no new complaints.  GENERAL: The patient is a well-developed, well-nourished, in no apparent distress.     Vital Signs Last 24 Hrs  T(C): 36.2 (25 Aug 2021 19:57), Max: 36.4 (25 Aug 2021 05:45)  T(F): 97.2 (25 Aug 2021 19:57), Max: 97.5 (25 Aug 2021 05:45)  HR: 86 (25 Aug 2021 19:57) (77 - 93)  BP: 119/72 (25 Aug 2021 19:57) (108/87 - 129/79)  BP(mean): --  RR: 17 (25 Aug 2021 19:57) (17 - 19)  SpO2: 96% (25 Aug 2021 19:57) (94% - 96%)   (if applicable)    Chest Tube (if applicable)    HEENT: Head is normocephalic and atraumatic. Extraocular muscles are intact. Mucous membranes are moist.     NECK: Supple, no palpable adenopathy.    LUNGS: Clear to auscultation, no wheezing, rales, or rhonchi.    HEART: Regular rate and rhythm without murmur.    ABDOMEN: Soft, nontender, and nondistended.  No hepatosplenomegaly is noted.    : No painful voiding, no pelvic pain    EXTREMITIES: Without any cyanosis, clubbing, rash, lesions or edema.    NEUROLOGIC: Awake, alert, oriented, grossly intact    SKIN: Warm, dry, good turgor.      LABS:                        12.6   9.13  )-----------( 170      ( 25 Aug 2021 06:58 )             41.7     08-25    136  |  93<L>  |  19<H>  ----------------------------<  340<H>  5.4<H>   |  42<H>  |  0.61    Ca    8.6      25 Aug 2021 06:58  Phos  4.0     08-25  Mg     2.1     08-25    TPro  6.1  /  Alb  3.1<L>  /  TBili  0.4  /  DBili  x   /  AST  12  /  ALT  32  /  AlkPhos  58  08-25                        MICROBIOLOGY: (if applicable)    RADIOLOGY & ADDITIONAL STUDIES:  EKG:   CXR:  ECHO:    IMPRESSION: 61y Male PAST MEDICAL & SURGICAL HISTORY:  COPD (chronic obstructive pulmonary disease)  Oxygen 2.5L at home    Stented coronary artery  2017    HLD (hyperlipidemia)    Pulmonary HTN    Type 2 diabetes mellitus without complication, with long-term current use of insulin    Coronary artery disease involving native coronary artery of native heart without angina pectoris    Bipolar 1 disorder    Smoker    Gastroesophageal reflux disease, esophagitis presence not specified    Oxygen dependent    DM (diabetes mellitus)    CAD (coronary artery disease)    GERD (gastroesophageal reflux disease)    Insomnia    Polyarthritis    No significant past surgical history     p/w           IMP: 62 yo M from [facility] active smoker with  HTN, HLD, DM, CHF, CAD w stents chronic hypoxic resp failure due to  COPD requiring O2 supp @ 2.5L  pulmonary hypertension on BIPAP (12/5 35% non complaint) presented to the ED c/o SOB and leg swelling. Pat admitted for acute on chronic hypoxic hypercapnic resp failure requiring BiPaP support due to CHF.  Pat is afebrile, normal WBC and neg procalcitonin, CXR with b/l lower lungs infiltrate due to pulmonary edema . i doubt infectious processes         Sugg;  -continue BiPaP at night   -O2 supp to maintain sat >90%  -taper off solumedrol   -Symbicort   -Spiriva   -albuterol inhaler   -consider d/c  antibx   -diuresis   -advise to stop smoking  -nicotine patch   -monitor blood sugar with coverage   -dvt/ gi prophy

## 2021-08-25 NOTE — DISCHARGE NOTE PROVIDER - NSDCMRMEDTOKEN_GEN_ALL_CORE_FT
ALPRAZolam 0.5 mg oral tablet: 1 tab(s) orally 2 times a day MDD:2  Artificial Tears ophthalmic solution: 1 dose(s) to each affected eye once a day  aspirin 81 mg oral tablet: 1 tab(s) orally once a day  DuoNeb 0.5 mg-2.5 mg/3 mL inhalation solution: 1 scoop(s) inhaled  Eucerin topical lotion: Apply topically to affected area once a day  furosemide 20 mg oral tablet: 1 tab(s) orally once a day  Melatonin 3 mg oral tablet: 1 tab(s) orally once a day (at bedtime)  MiraLax oral powder for reconstitution:   multivitamin with minerals:   nicotine 21 mg/24 hr transdermal film, extended release: 1 patch transdermal once a day  NovoLIN 70/30 subcutaneous suspension: 1  subcutaneous 2 times a day  pantoprazole 40 mg oral delayed release tablet: 1 tab(s) orally once a day  Percocet 10/325 oral tablet: 1 tab(s) orally every 6 hours  RisperDAL 1 mg oral tablet: 1 tab(s) orally once a day  senna oral tablet: 2 tab(s) orally once a day (at bedtime)  simethicone 80 mg oral tablet: 1 tab(s) orally 4 times a day (after meals and at bedtime)  Symbicort 160 mcg-4.5 mcg/inh inhalation aerosol: 2 puff(s) inhaled 2 times a day  tiotropium 18 mcg inhalation capsule: 1 cap(s) inhaled once a day  traZODone 150 mg oral tablet: 1 tab(s) orally once a day (at bedtime)   ALPRAZolam 0.5 mg oral tablet: 1 tab(s) orally 2 times a day MDD:2  Artificial Tears ophthalmic solution: 1 dose(s) to each affected eye once a day  aspirin 81 mg oral tablet: 1 tab(s) orally once a day  DuoNeb 0.5 mg-2.5 mg/3 mL inhalation solution: 1 scoop(s) inhaled  Eucerin topical lotion: Apply topically to affected area once a day  furosemide 20 mg oral tablet: 1 tab(s) orally once a day  Melatonin 3 mg oral tablet: 1 tab(s) orally once a day (at bedtime)  MiraLax oral powder for reconstitution:   multivitamin with minerals:   nicotine 21 mg/24 hr transdermal film, extended release: 1 patch transdermal once a day  NovoLIN 70/30 subcutaneous suspension: 1  subcutaneous 2 times a day  oxycodone-acetaminophen 10 mg-325 mg oral tablet: 1 tab(s) orally every 6 hours, As Needed -for severe pain MDD:4  pantoprazole 40 mg oral delayed release tablet: 1 tab(s) orally once a day  predniSONE 10 mg oral tablet: 3 tab(s) orally once a day x 2 days  2 tab(s) orally once a day x 2 days  1 tab(s) orally once a day x 2 days  RisperDAL 1 mg oral tablet: 1 tab(s) orally once a day  senna oral tablet: 2 tab(s) orally once a day (at bedtime)  simethicone 80 mg oral tablet: 1 tab(s) orally 4 times a day (after meals and at bedtime)  Symbicort 160 mcg-4.5 mcg/inh inhalation aerosol: 2 puff(s) inhaled 2 times a day  tiotropium 18 mcg inhalation capsule: 1 cap(s) inhaled once a day  traZODone 150 mg oral tablet: 1 tab(s) orally once a day (at bedtime)

## 2021-08-25 NOTE — PROGRESS NOTE ADULT - REASON FOR ADMISSION
COPD EXACERBATION AND PNA

## 2021-11-13 ENCOUNTER — INPATIENT (INPATIENT)
Facility: HOSPITAL | Age: 61
LOS: 2 days | Discharge: HOME | DRG: 190 | End: 2021-11-16
Attending: INTERNAL MEDICINE | Admitting: INTERNAL MEDICINE
Payer: MEDICAID

## 2021-11-13 VITALS
HEART RATE: 105 BPM | TEMPERATURE: 98 F | RESPIRATION RATE: 16 BRPM | HEIGHT: 68 IN | DIASTOLIC BLOOD PRESSURE: 75 MMHG | OXYGEN SATURATION: 97 % | SYSTOLIC BLOOD PRESSURE: 111 MMHG

## 2021-11-13 DIAGNOSIS — I25.10 ATHEROSCLEROTIC HEART DISEASE OF NATIVE CORONARY ARTERY WITHOUT ANGINA PECTORIS: ICD-10-CM

## 2021-11-13 DIAGNOSIS — M54.9 DORSALGIA, UNSPECIFIED: ICD-10-CM

## 2021-11-13 DIAGNOSIS — Z29.9 ENCOUNTER FOR PROPHYLACTIC MEASURES, UNSPECIFIED: ICD-10-CM

## 2021-11-13 DIAGNOSIS — J44.1 CHRONIC OBSTRUCTIVE PULMONARY DISEASE WITH (ACUTE) EXACERBATION: ICD-10-CM

## 2021-11-13 DIAGNOSIS — E11.9 TYPE 2 DIABETES MELLITUS WITHOUT COMPLICATIONS: ICD-10-CM

## 2021-11-13 PROBLEM — Z95.5 PRESENCE OF CORONARY ANGIOPLASTY IMPLANT AND GRAFT: Chronic | Status: ACTIVE | Noted: 2018-10-03

## 2021-11-13 LAB
ALBUMIN SERPL ELPH-MCNC: 2.9 G/DL — LOW (ref 3.5–5)
ALP SERPL-CCNC: 79 U/L — SIGNIFICANT CHANGE UP (ref 40–120)
ALT FLD-CCNC: 16 U/L DA — SIGNIFICANT CHANGE UP (ref 10–60)
ANION GAP SERPL CALC-SCNC: 1 MMOL/L — LOW (ref 5–17)
AST SERPL-CCNC: 4 U/L — LOW (ref 10–40)
BASE EXCESS BLDV CALC-SCNC: 16.9 MMOL/L — SIGNIFICANT CHANGE UP
BASOPHILS # BLD AUTO: 0.02 K/UL — SIGNIFICANT CHANGE UP (ref 0–0.2)
BASOPHILS NFR BLD AUTO: 0.2 % — SIGNIFICANT CHANGE UP (ref 0–2)
BILIRUB SERPL-MCNC: 0.3 MG/DL — SIGNIFICANT CHANGE UP (ref 0.2–1.2)
BUN SERPL-MCNC: 9 MG/DL — SIGNIFICANT CHANGE UP (ref 7–18)
CALCIUM SERPL-MCNC: 9 MG/DL — SIGNIFICANT CHANGE UP (ref 8.4–10.5)
CHLORIDE SERPL-SCNC: 91 MMOL/L — LOW (ref 96–108)
CO2 SERPL-SCNC: 44 MMOL/L — HIGH (ref 22–31)
CREAT SERPL-MCNC: 0.62 MG/DL — SIGNIFICANT CHANGE UP (ref 0.5–1.3)
EOSINOPHIL # BLD AUTO: 0.08 K/UL — SIGNIFICANT CHANGE UP (ref 0–0.5)
EOSINOPHIL NFR BLD AUTO: 0.8 % — SIGNIFICANT CHANGE UP (ref 0–6)
GLUCOSE BLDC GLUCOMTR-MCNC: 381 MG/DL — HIGH (ref 70–99)
GLUCOSE SERPL-MCNC: 266 MG/DL — HIGH (ref 70–99)
HCO3 BLDV-SCNC: 48 MMOL/L — CRITICAL HIGH (ref 22–29)
HCT VFR BLD CALC: 38.7 % — LOW (ref 39–50)
HGB BLD-MCNC: 11.9 G/DL — LOW (ref 13–17)
HOROWITZ INDEX BLDV+IHG-RTO: 21 — SIGNIFICANT CHANGE UP
IMM GRANULOCYTES NFR BLD AUTO: 0.7 % — SIGNIFICANT CHANGE UP (ref 0–1.5)
LYMPHOCYTES # BLD AUTO: 1.45 K/UL — SIGNIFICANT CHANGE UP (ref 1–3.3)
LYMPHOCYTES # BLD AUTO: 15.2 % — SIGNIFICANT CHANGE UP (ref 13–44)
MAGNESIUM SERPL-MCNC: 2 MG/DL — SIGNIFICANT CHANGE UP (ref 1.6–2.6)
MCHC RBC-ENTMCNC: 27.9 PG — SIGNIFICANT CHANGE UP (ref 27–34)
MCHC RBC-ENTMCNC: 30.7 GM/DL — LOW (ref 32–36)
MCV RBC AUTO: 90.6 FL — SIGNIFICANT CHANGE UP (ref 80–100)
MONOCYTES # BLD AUTO: 0.83 K/UL — SIGNIFICANT CHANGE UP (ref 0–0.9)
MONOCYTES NFR BLD AUTO: 8.7 % — SIGNIFICANT CHANGE UP (ref 2–14)
NEUTROPHILS # BLD AUTO: 7.11 K/UL — SIGNIFICANT CHANGE UP (ref 1.8–7.4)
NEUTROPHILS NFR BLD AUTO: 74.4 % — SIGNIFICANT CHANGE UP (ref 43–77)
NRBC # BLD: 0 /100 WBCS — SIGNIFICANT CHANGE UP (ref 0–0)
NT-PROBNP SERPL-SCNC: 101 PG/ML — SIGNIFICANT CHANGE UP (ref 0–125)
PCO2 BLDV: 93 MMHG — HIGH (ref 42–55)
PH BLDV: 7.32 — SIGNIFICANT CHANGE UP (ref 7.32–7.43)
PLATELET # BLD AUTO: 188 K/UL — SIGNIFICANT CHANGE UP (ref 150–400)
PO2 BLDV: 46 MMHG — SIGNIFICANT CHANGE UP
POTASSIUM SERPL-MCNC: 4.5 MMOL/L — SIGNIFICANT CHANGE UP (ref 3.5–5.3)
POTASSIUM SERPL-SCNC: 4.5 MMOL/L — SIGNIFICANT CHANGE UP (ref 3.5–5.3)
PROT SERPL-MCNC: 6.4 G/DL — SIGNIFICANT CHANGE UP (ref 6–8.3)
RAPID RVP RESULT: SIGNIFICANT CHANGE UP
RBC # BLD: 4.27 M/UL — SIGNIFICANT CHANGE UP (ref 4.2–5.8)
RBC # FLD: 13.3 % — SIGNIFICANT CHANGE UP (ref 10.3–14.5)
SAO2 % BLDV: 81.9 % — SIGNIFICANT CHANGE UP
SARS-COV-2 RNA SPEC QL NAA+PROBE: SIGNIFICANT CHANGE UP
SODIUM SERPL-SCNC: 136 MMOL/L — SIGNIFICANT CHANGE UP (ref 135–145)
TROPONIN I, HIGH SENSITIVITY RESULT: 10 NG/L — SIGNIFICANT CHANGE UP
TROPONIN I, HIGH SENSITIVITY RESULT: 8.5 NG/L — SIGNIFICANT CHANGE UP
WBC # BLD: 9.56 K/UL — SIGNIFICANT CHANGE UP (ref 3.8–10.5)
WBC # FLD AUTO: 9.56 K/UL — SIGNIFICANT CHANGE UP (ref 3.8–10.5)

## 2021-11-13 PROCEDURE — 93010 ELECTROCARDIOGRAM REPORT: CPT

## 2021-11-13 PROCEDURE — 71045 X-RAY EXAM CHEST 1 VIEW: CPT | Mod: 26

## 2021-11-13 PROCEDURE — 99285 EMERGENCY DEPT VISIT HI MDM: CPT

## 2021-11-13 RX ORDER — LANOLIN/MINERAL OIL
1 LOTION (ML) TOPICAL
Qty: 0 | Refills: 0 | DISCHARGE

## 2021-11-13 RX ORDER — CEFTRIAXONE 500 MG/1
1000 INJECTION, POWDER, FOR SOLUTION INTRAMUSCULAR; INTRAVENOUS ONCE
Refills: 0 | Status: COMPLETED | OUTPATIENT
Start: 2021-11-13 | End: 2021-11-13

## 2021-11-13 RX ORDER — PANTOPRAZOLE SODIUM 20 MG/1
40 TABLET, DELAYED RELEASE ORAL
Refills: 0 | Status: DISCONTINUED | OUTPATIENT
Start: 2021-11-13 | End: 2021-11-16

## 2021-11-13 RX ORDER — BUDESONIDE AND FORMOTEROL FUMARATE DIHYDRATE 160; 4.5 UG/1; UG/1
2 AEROSOL RESPIRATORY (INHALATION)
Refills: 0 | Status: DISCONTINUED | OUTPATIENT
Start: 2021-11-13 | End: 2021-11-16

## 2021-11-13 RX ORDER — FUROSEMIDE 40 MG
20 TABLET ORAL DAILY
Refills: 0 | Status: DISCONTINUED | OUTPATIENT
Start: 2021-11-13 | End: 2021-11-16

## 2021-11-13 RX ORDER — MULTIVIT-MIN/FERROUS GLUCONATE 9 MG/15 ML
1 LIQUID (ML) ORAL DAILY
Refills: 0 | Status: DISCONTINUED | OUTPATIENT
Start: 2021-11-13 | End: 2021-11-16

## 2021-11-13 RX ORDER — NICOTINE POLACRILEX 2 MG
1 GUM BUCCAL DAILY
Refills: 0 | Status: DISCONTINUED | OUTPATIENT
Start: 2021-11-13 | End: 2021-11-16

## 2021-11-13 RX ORDER — OXYCODONE AND ACETAMINOPHEN 5; 325 MG/1; MG/1
2 TABLET ORAL EVERY 4 HOURS
Refills: 0 | Status: DISCONTINUED | OUTPATIENT
Start: 2021-11-13 | End: 2021-11-16

## 2021-11-13 RX ORDER — INSULIN LISPRO 100/ML
VIAL (ML) SUBCUTANEOUS
Refills: 0 | Status: DISCONTINUED | OUTPATIENT
Start: 2021-11-13 | End: 2021-11-15

## 2021-11-13 RX ORDER — TRAZODONE HCL 50 MG
150 TABLET ORAL AT BEDTIME
Refills: 0 | Status: DISCONTINUED | OUTPATIENT
Start: 2021-11-13 | End: 2021-11-13

## 2021-11-13 RX ORDER — POLYETHYLENE GLYCOL 3350 17 G/17G
17 POWDER, FOR SOLUTION ORAL DAILY
Refills: 0 | Status: DISCONTINUED | OUTPATIENT
Start: 2021-11-13 | End: 2021-11-16

## 2021-11-13 RX ORDER — SENNA PLUS 8.6 MG/1
2 TABLET ORAL AT BEDTIME
Refills: 0 | Status: DISCONTINUED | OUTPATIENT
Start: 2021-11-13 | End: 2021-11-16

## 2021-11-13 RX ORDER — ENOXAPARIN SODIUM 100 MG/ML
40 INJECTION SUBCUTANEOUS DAILY
Refills: 0 | Status: DISCONTINUED | OUTPATIENT
Start: 2021-11-13 | End: 2021-11-16

## 2021-11-13 RX ORDER — AZITHROMYCIN 500 MG/1
500 TABLET, FILM COATED ORAL DAILY
Refills: 0 | Status: COMPLETED | OUTPATIENT
Start: 2021-11-13 | End: 2021-11-16

## 2021-11-13 RX ORDER — IPRATROPIUM/ALBUTEROL SULFATE 18-103MCG
3 AEROSOL WITH ADAPTER (GRAM) INHALATION ONCE
Refills: 0 | Status: COMPLETED | OUTPATIENT
Start: 2021-11-13 | End: 2021-11-13

## 2021-11-13 RX ORDER — CEFTRIAXONE 500 MG/1
1000 INJECTION, POWDER, FOR SOLUTION INTRAMUSCULAR; INTRAVENOUS EVERY 24 HOURS
Refills: 0 | Status: DISCONTINUED | OUTPATIENT
Start: 2021-11-13 | End: 2021-11-16

## 2021-11-13 RX ORDER — ALBUTEROL 90 UG/1
2 AEROSOL, METERED ORAL EVERY 6 HOURS
Refills: 0 | Status: DISCONTINUED | OUTPATIENT
Start: 2021-11-13 | End: 2021-11-16

## 2021-11-13 RX ORDER — LANOLIN ALCOHOL/MO/W.PET/CERES
3 CREAM (GRAM) TOPICAL AT BEDTIME
Refills: 0 | Status: DISCONTINUED | OUTPATIENT
Start: 2021-11-13 | End: 2021-11-16

## 2021-11-13 RX ORDER — RISPERIDONE 4 MG/1
1 TABLET ORAL
Qty: 0 | Refills: 0 | DISCHARGE

## 2021-11-13 RX ORDER — SIMETHICONE 80 MG/1
80 TABLET, CHEWABLE ORAL DAILY
Refills: 0 | Status: DISCONTINUED | OUTPATIENT
Start: 2021-11-13 | End: 2021-11-16

## 2021-11-13 RX ORDER — ALPRAZOLAM 0.25 MG
0.5 TABLET ORAL
Refills: 0 | Status: DISCONTINUED | OUTPATIENT
Start: 2021-11-13 | End: 2021-11-16

## 2021-11-13 RX ORDER — ASPIRIN/CALCIUM CARB/MAGNESIUM 324 MG
81 TABLET ORAL DAILY
Refills: 0 | Status: DISCONTINUED | OUTPATIENT
Start: 2021-11-13 | End: 2021-11-16

## 2021-11-13 RX ADMIN — OXYCODONE AND ACETAMINOPHEN 2 TABLET(S): 5; 325 TABLET ORAL at 19:10

## 2021-11-13 RX ADMIN — CEFTRIAXONE 100 MILLIGRAM(S): 500 INJECTION, POWDER, FOR SOLUTION INTRAMUSCULAR; INTRAVENOUS at 13:05

## 2021-11-13 RX ADMIN — Medication 3 MILLILITER(S): at 10:00

## 2021-11-13 RX ADMIN — Medication 3 MILLILITER(S): at 09:33

## 2021-11-13 RX ADMIN — Medication 125 MILLIGRAM(S): at 09:32

## 2021-11-13 RX ADMIN — Medication 10: at 17:32

## 2021-11-13 RX ADMIN — OXYCODONE AND ACETAMINOPHEN 2 TABLET(S): 5; 325 TABLET ORAL at 18:36

## 2021-11-13 NOTE — H&P ADULT - ATTENDING COMMENTS
PATIENT WAS SEEN AND EXAMINED , D/W ER MD, RESIDENT MD AND PATIENT    - COPD EXACERBATION, ACUTE ON CHRONIC HYPOXIC & HYPERCAPNOEIC RESPIRATORY FAILURE - STARTED ON IV STEROIDS, IV ANTIBIOTICS, NEBS,  BiPAP, O2 SUPPLEMENTS. PULMONARY CONSULT   - DM - CONTINUE HOME INSULIN 70/30, AND SLIDING SCALE INSULIN AS GIVEN AT HOME   - GI AND DVT PROPHYLAXIS    - DR. REFUGIO CABELLO

## 2021-11-13 NOTE — H&P ADULT - NSHPPHYSICALEXAM_GEN_ALL_CORE
PHYSICAL EXAM:  GENERAL: Obese male patient using nasal cannula, not in distress  HEAD:  Atraumatic, Normocephalic  EYES: EOMI, PERRLA, conjunctiva and sclera clear  NECK: Supple, No JVD  CHEST/LUNG: No wheeze, decreased breath sounds bilaterally  HEART: Regular rate and rhythm; s1+ s2+  ABDOMEN: Soft, Nontender, distended 2/2 obesity ; Bowel sounds present  EXTREMITIES:, No clubbing, cyanosis, or edema  NEUROLOGY: AAOx3 non-focal  SKIN: Dry skin bilateral LE

## 2021-11-13 NOTE — ED PROVIDER NOTE - OBJECTIVE STATEMENT
61M from [facility] Osiel Ashby has PMH of HTN, HLD, DM, CHF, CAD w stents COPD on 2.5L at home, pulmonary hypertension on BIPAP, presenting with two days of worsening shortness of breath. patient reports he was recently treated for a uti. over the past two days he has had a worsening dry cough, congestion, and has had to increase his oxygen level to 3. no fever, chest pain, vomiting, abdominal pain, pain or swelling of lower extremities.

## 2021-11-13 NOTE — H&P ADULT - NSICDXPASTMEDICALHX_GEN_ALL_CORE_FT
PAST MEDICAL HISTORY:  CAD (coronary artery disease)     COPD (chronic obstructive pulmonary disease) Oxygen 2-3L at home    Coronary artery disease involving native coronary artery of native heart without angina pectoris     DM (diabetes mellitus)     Gastroesophageal reflux disease, esophagitis presence not specified     GERD (gastroesophageal reflux disease)     HLD (hyperlipidemia)     Insomnia     Oxygen dependent     Pulmonary HTN     Smoker     Stented coronary artery 2017    Type 2 diabetes mellitus without complication, with long-term current use of insulin

## 2021-11-13 NOTE — H&P ADULT - HISTORY OF PRESENT ILLNESS
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 Pt says he is always oxygen dependent for ambulation for the past 1.5 years, however he has had worsening dry cough and nasal congestion over the last few days associated increased dyspnea. C/o palpitations secondary to being anxious due to worsening SOB and decreased appetite. No c/o chest pain, leg pain or swelling, sick contacts, abdominal pain, n/v, diarrhea. Pt completed a 5 day course of ?Ciprofloxacin 500 mg for UTI last week (endorses dysuria has resolved).    In the ED, pt's vitals were  Afebrile, , /75, RR 16 on 3 L NC saturating 97% which was subsequently downtitrated to maintain target O2 88-92% in light of COPD hx  Cxray no focal consolidation noted  RVP negative, trop x 1 negative, EKG RBBB (not new), Sinus tachycardia 105 bpm, Twave inversions V1, 2, 3 Patient is a 60 y/o obese Male from [facility], active smoker, with medical hx significant for COPD (on 2-3 liters O2 daily, BiPAP nightly), CAD s/p 1 stent (Aug 2017), Type 2 DM, HTN, TIA (2017), Chronic pain 2/2 RTA 2010 on Percocet, Anxiety, HLD, Moderate Pulm HTN, p/w dry cough, increased shortness of breath over the last 1 week. Pt says he is always oxygen dependent for ambulation for the past 1.5 years, however he has had worsening dry cough and nasal congestion over the last few days associated increased dyspnea. C/o palpitations secondary to being anxious due to worsening SOB and decreased appetite. No c/o chest pain, leg pain or swelling, sick contacts, abdominal pain, n/v, diarrhea. Pt completed a 5 day course of ?Ciprofloxacin 500 mg for UTI last week (endorses dysuria has resolved).    In the ED, pt's vitals were  Afebrile, , /75, RR 16 on 3 L NC saturating 97% which was subsequently downtitrated to maintain target O2 88-92% in light of COPD hx  Cxray no focal consolidation noted  RVP negative, trop x 1 negative, EKG RBBB (not new), Sinus tachycardia 105 bpm, Twave inversions V1, 2, 3 Patient is a 60 y/o obese Male from [facility], active smoker, with medical hx significant for COPD (on 2-3 liters O2 daily, BiPAP nightly--noncompliant), previously intubated in Aug 2021, CAD s/p 1 stent (Aug 2017), Type 2 DM, HTN, TIA (2017), Chronic pain 2/2 RTA 2010 on Percocet, Anxiety, HLD, Moderate Pulm HTN, ?CHF (was volume overloaded on previous admission to ICU), p/w dry cough, increased shortness of breath over the last 1 week. Pt says he is always oxygen dependent for ambulation for the past 1.5 years, however he has had worsening dry cough and nasal congestion over the last few days associated increased dyspnea. C/o palpitations secondary to being anxious due to worsening SOB and decreased appetite. No c/o chest pain, leg pain or swelling, sick contacts, abdominal pain, n/v, diarrhea. Pt completed a 5 day course of ?Ciprofloxacin 500 mg for UTI last week (endorses dysuria has resolved).    In the ED, pt's vitals were  Afebrile, , /75, RR 16 on 3 L NC saturating 97% which was subsequently downtitrated to maintain target O2 88-92% in light of COPD hx  Cxray no focal consolidation noted  RVP negative, trop x 1 negative, EKG RBBB (not new), Sinus tachycardia 105 bpm, Twave inversions V1, 2, 3

## 2021-11-13 NOTE — ED PROVIDER NOTE - PHYSICAL EXAMINATION
General: well appearing male, no acute distress   HEENT: normocephalic, atraumatic   Respiratory: normal work of breathing, poor air movement   Cardiac: regular rate and rhythm   Abdomen: soft, non-tender, no guarding or rebound   MSK: no swelling or tenderness of lower extremities, moving all extremities spontaneously   Skin: warm, dry   Neuro: A&Ox3  Psych: appropriate affect

## 2021-11-13 NOTE — ED ADULT NURSE NOTE - OBJECTIVE STATEMENT
Pt arrived [facility] from [facility] for difficulty breathing x few days , getting worse  Pt has a Hx of COPD and is on home O2

## 2021-11-13 NOTE — ED ADULT TRIAGE NOTE - CHIEF COMPLAINT QUOTE
ADIS from [facility] with difficulty breathing for few days, on home oxygen  worse today.

## 2021-11-13 NOTE — H&P ADULT - PROBLEM SELECTOR PLAN 4
Pt takes Percocet PRN daily due to chronic back pain 2/2 RTA many years ago, on bowel regimen secondary to pain medications  C/w Percocet 2 tabs Q4 hrs PRN for severe pain, Miralax and senna PRN

## 2021-11-13 NOTE — ED ADULT NURSE NOTE - CHIEF COMPLAINT QUOTE
ADIS from [facility] with difficulty breathing for few days, on home oxygen  worse today.

## 2021-11-13 NOTE — H&P ADULT - PROBLEM SELECTOR PLAN 2
Pt has hx of DM, takes Novolin 70/30 as per sliding scale  -Currently started on moderate dose sliding scale. Would calculate 24 hr requirement and start standing Lantus tomorrow given pt is on IV steroids  -A1c July 2021 6.5

## 2021-11-13 NOTE — ED ADULT TRIAGE NOTE - DOMESTIC TRAVEL HIGH RISK QUESTION
Patient no showed for 11/16/20 appointment at [address] [id number] office. Writer attempted to call patient to reschedule appointment, but number was out of the service. No

## 2021-11-13 NOTE — ED PROVIDER NOTE - CLINICAL SUMMARY MEDICAL DECISION MAKING FREE TEXT BOX
61M presenting with cough and shortness of breath. afebrile, poor air movement on exam. likely copd exacerbation with possible viral etiology. will get labs, cxr, ekg, symptom control. will reassess.

## 2021-11-13 NOTE — H&P ADULT - PROBLEM SELECTOR PLAN 1
Pt p/w increasing shortness of breath x 1 week, cough, uses home oxygen 2-3 L at NH and has been previously intubated in Aug 2021, Bipap nightly (noncompliant as per chart review)  -Pt recently finished a course of abx for UTI, could likely trigger underlying COPD exacerbation. S/p duonebs and 1 dose of 125 mg Solumedrol in ED, saturating 94% on 2L NC on my exam  -Will titrate oxygen to be maintained around 88-92% in light of COPD hx  -C/w Mucinex Q12 hrs for cough  -Started on Solumedrol 40 mg Q8hrs, Albuterol 2 puffs PRN Q6 hrs and Symbicort 2 puffs BID  -F/u procalcitonin Pt p/w increasing shortness of breath x 1 week, cough, uses home oxygen 2-3 L at NH and has been previously intubated in Aug 2021, Bipap nightly (noncompliant as per chart review)  -Pt recently finished a course of abx for UTI, could likely trigger underlying COPD exacerbation. S/p duonebs and 1 dose of 125 mg Solumedrol in ED, saturating 94% on 2L NC on my exam  -Will titrate oxygen to be maintained around 88-92% in light of COPD hx  -C/w Mucinex Q12 hrs for cough  -Started on Solumedrol 40 mg Q8hrs, Albuterol 2 puffs PRN Q6 hrs and Symbicort 2 puffs BID, Azithromycin PO x 3 days and IV Rocephin   -F/u procalcitonin  Dr. Marquez [place] consulted

## 2021-11-13 NOTE — ED PROVIDER NOTE - PROGRESS NOTE DETAILS
patient updated on results. symptoms mildly improved. will admit for COPD exacerbation. will admit. Cristian Flores

## 2021-11-13 NOTE — H&P ADULT - PROBLEM SELECTOR PLAN 3
Pt has hx of CAD s/p 1 stent in 2017  EKG RBBB (not new), T wave inversions V1-V3  Trop x 1 negative, f/u 2nd trop, pt has no c/o chest pain  C/w ASA and statin

## 2021-11-13 NOTE — H&P ADULT - NSHPREVIEWOFSYSTEMS_GEN_ALL_CORE
REVIEW OF SYSTEMS:    CONSTITUTIONAL: No weakness, fevers or chills  EYES/ENT: No visual changes;  No vertigo or throat pain   NECK: No pain or stiffness  RESPIRATORY: + cough, no wheezing, hemoptysis; + shortness of breath  CARDIOVASCULAR: No chest pain or palpitations  GASTROINTESTINAL: No abdominal or epigastric pain. No nausea, vomiting, or hematemesis; No diarrhea or constipation. No melena or hematochezia.  GENITOURINARY: No dysuria, frequency or hematuria  NEUROLOGICAL: No numbness or weakness  SKIN: No itching, burning, rashes, or lesions   All other review of systems is negative unless indicated above.

## 2021-11-13 NOTE — H&P ADULT - ASSESSMENT
Patient is a 60 y/o obese Male from [facility], active smoker, with medical hx significant for COPD (on 2-3 liters O2 daily, BiPAP nightly), CAD s/p 1 stent (Aug 2017), Type 2 DM, HTN, TIA (2017), Chronic pain 2/2 RTA 2010 on Percocet, Anxiety, HLD, Moderate Pulm HTN, p/w dry cough, increased shortness of breath over the last 1 week, admitted for COPD Exacerbation

## 2021-11-13 NOTE — ED PROVIDER NOTE - NSFOLLOWUPINSTRUCTIONS_ED_ALL_ED_FT
You were seen in the emergency department for shortness of breath.     Please follow-up with your primary care doctor in the next 24-48 hours.     If you have any worsening symptoms, severe chest pain, shortness of breath, nausea or vomiting, please return to the emergency department.

## 2021-11-13 NOTE — ED ADULT NURSE NOTE - NSIMPLEMENTINTERV_GEN_ALL_ED
Implemented All Universal Safety Interventions:  [place] to call system. Call bell, personal items and telephone within reach. Instruct patient to call for assistance. Room bathroom lighting operational. Non-slip footwear when patient is off stretcher. Physically safe environment: no spills, clutter or unnecessary equipment. Stretcher in lowest position, wheels locked, appropriate side rails in place.

## 2021-11-13 NOTE — H&P ADULT - NSICDXFAMILYHX_GEN_ALL_CORE_FT
FAMILY HISTORY:  No family history of chronic obstructive pulmonary disease  No family history of hypertension  No family history of mental disorder    Mother  Still living? No  FH: myocardial infarction, Age at diagnosis: Age Unknown

## 2021-11-14 ENCOUNTER — TRANSCRIPTION ENCOUNTER (OUTPATIENT)
Age: 61
End: 2021-11-14

## 2021-11-14 LAB
ANION GAP SERPL CALC-SCNC: 2 MMOL/L — LOW (ref 5–17)
BASOPHILS # BLD AUTO: 0.01 K/UL — SIGNIFICANT CHANGE UP (ref 0–0.2)
BASOPHILS NFR BLD AUTO: 0.1 % — SIGNIFICANT CHANGE UP (ref 0–2)
BUN SERPL-MCNC: 17 MG/DL — SIGNIFICANT CHANGE UP (ref 7–18)
CALCIUM SERPL-MCNC: 8.8 MG/DL — SIGNIFICANT CHANGE UP (ref 8.4–10.5)
CHLORIDE SERPL-SCNC: 94 MMOL/L — LOW (ref 96–108)
CO2 SERPL-SCNC: 38 MMOL/L — HIGH (ref 22–31)
COVID-19 NUCLEOCAPSID GAM AB INTERP: POSITIVE
COVID-19 NUCLEOCAPSID TOTAL GAM ANTIBODY RESULT: 37.3 INDEX — HIGH
COVID-19 SPIKE DOMAIN AB INTERP: POSITIVE
COVID-19 SPIKE DOMAIN ANTIBODY RESULT: >250 U/ML — HIGH
CREAT SERPL-MCNC: 0.63 MG/DL — SIGNIFICANT CHANGE UP (ref 0.5–1.3)
EOSINOPHIL # BLD AUTO: 0 K/UL — SIGNIFICANT CHANGE UP (ref 0–0.5)
EOSINOPHIL NFR BLD AUTO: 0 % — SIGNIFICANT CHANGE UP (ref 0–6)
GLUCOSE BLDC GLUCOMTR-MCNC: 294 MG/DL — HIGH (ref 70–99)
GLUCOSE BLDC GLUCOMTR-MCNC: 319 MG/DL — HIGH (ref 70–99)
GLUCOSE BLDC GLUCOMTR-MCNC: 323 MG/DL — HIGH (ref 70–99)
GLUCOSE BLDC GLUCOMTR-MCNC: 406 MG/DL — HIGH (ref 70–99)
GLUCOSE BLDC GLUCOMTR-MCNC: 413 MG/DL — HIGH (ref 70–99)
GLUCOSE BLDC GLUCOMTR-MCNC: 488 MG/DL — CRITICAL HIGH (ref 70–99)
GLUCOSE SERPL-MCNC: 326 MG/DL — HIGH (ref 70–99)
HCT VFR BLD CALC: 38.8 % — LOW (ref 39–50)
HGB BLD-MCNC: 12 G/DL — LOW (ref 13–17)
IMM GRANULOCYTES NFR BLD AUTO: 1 % — SIGNIFICANT CHANGE UP (ref 0–1.5)
LYMPHOCYTES # BLD AUTO: 0.91 K/UL — LOW (ref 1–3.3)
LYMPHOCYTES # BLD AUTO: 10.3 % — LOW (ref 13–44)
MAGNESIUM SERPL-MCNC: 2.2 MG/DL — SIGNIFICANT CHANGE UP (ref 1.6–2.6)
MCHC RBC-ENTMCNC: 27.3 PG — SIGNIFICANT CHANGE UP (ref 27–34)
MCHC RBC-ENTMCNC: 30.9 GM/DL — LOW (ref 32–36)
MCV RBC AUTO: 88.4 FL — SIGNIFICANT CHANGE UP (ref 80–100)
MONOCYTES # BLD AUTO: 0.27 K/UL — SIGNIFICANT CHANGE UP (ref 0–0.9)
MONOCYTES NFR BLD AUTO: 3 % — SIGNIFICANT CHANGE UP (ref 2–14)
NEUTROPHILS # BLD AUTO: 7.58 K/UL — HIGH (ref 1.8–7.4)
NEUTROPHILS NFR BLD AUTO: 85.6 % — HIGH (ref 43–77)
NRBC # BLD: 0 /100 WBCS — SIGNIFICANT CHANGE UP (ref 0–0)
PHOSPHATE SERPL-MCNC: 4.2 MG/DL — SIGNIFICANT CHANGE UP (ref 2.5–4.5)
PLATELET # BLD AUTO: 212 K/UL — SIGNIFICANT CHANGE UP (ref 150–400)
POTASSIUM SERPL-MCNC: 4.9 MMOL/L — SIGNIFICANT CHANGE UP (ref 3.5–5.3)
POTASSIUM SERPL-SCNC: 4.9 MMOL/L — SIGNIFICANT CHANGE UP (ref 3.5–5.3)
PROCALCITONIN SERPL-MCNC: 0.07 NG/ML — SIGNIFICANT CHANGE UP (ref 0.02–0.1)
RBC # BLD: 4.39 M/UL — SIGNIFICANT CHANGE UP (ref 4.2–5.8)
RBC # FLD: 13.1 % — SIGNIFICANT CHANGE UP (ref 10.3–14.5)
SARS-COV-2 IGG+IGM SERPL QL IA: 37.3 INDEX — HIGH
SARS-COV-2 IGG+IGM SERPL QL IA: >250 U/ML — HIGH
SARS-COV-2 IGG+IGM SERPL QL IA: POSITIVE
SARS-COV-2 IGG+IGM SERPL QL IA: POSITIVE
SODIUM SERPL-SCNC: 134 MMOL/L — LOW (ref 135–145)
WBC # BLD: 8.86 K/UL — SIGNIFICANT CHANGE UP (ref 3.8–10.5)
WBC # FLD AUTO: 8.86 K/UL — SIGNIFICANT CHANGE UP (ref 3.8–10.5)

## 2021-11-14 RX ORDER — DEXTROSE 50 % IN WATER 50 %
25 SYRINGE (ML) INTRAVENOUS ONCE
Refills: 0 | Status: DISCONTINUED | OUTPATIENT
Start: 2021-11-14 | End: 2021-11-16

## 2021-11-14 RX ORDER — INSULIN LISPRO 100/ML
15 VIAL (ML) SUBCUTANEOUS
Refills: 0 | Status: DISCONTINUED | OUTPATIENT
Start: 2021-11-14 | End: 2021-11-16

## 2021-11-14 RX ORDER — LIDOCAINE 4 G/100G
1 CREAM TOPICAL DAILY
Refills: 0 | Status: DISCONTINUED | OUTPATIENT
Start: 2021-11-14 | End: 2021-11-16

## 2021-11-14 RX ORDER — DEXTROSE 50 % IN WATER 50 %
15 SYRINGE (ML) INTRAVENOUS ONCE
Refills: 0 | Status: DISCONTINUED | OUTPATIENT
Start: 2021-11-14 | End: 2021-11-16

## 2021-11-14 RX ORDER — INSULIN GLARGINE 100 [IU]/ML
20 INJECTION, SOLUTION SUBCUTANEOUS AT BEDTIME
Refills: 0 | Status: DISCONTINUED | OUTPATIENT
Start: 2021-11-14 | End: 2021-11-16

## 2021-11-14 RX ORDER — DEXTROSE 50 % IN WATER 50 %
12.5 SYRINGE (ML) INTRAVENOUS ONCE
Refills: 0 | Status: DISCONTINUED | OUTPATIENT
Start: 2021-11-14 | End: 2021-11-16

## 2021-11-14 RX ORDER — GLUCAGON INJECTION, SOLUTION 0.5 MG/.1ML
1 INJECTION, SOLUTION SUBCUTANEOUS ONCE
Refills: 0 | Status: DISCONTINUED | OUTPATIENT
Start: 2021-11-14 | End: 2021-11-16

## 2021-11-14 RX ORDER — INSULIN LISPRO 100/ML
6 VIAL (ML) SUBCUTANEOUS ONCE
Refills: 0 | Status: DISCONTINUED | OUTPATIENT
Start: 2021-11-14 | End: 2021-11-16

## 2021-11-14 RX ADMIN — OXYCODONE AND ACETAMINOPHEN 2 TABLET(S): 5; 325 TABLET ORAL at 00:33

## 2021-11-14 RX ADMIN — Medication 1 PATCH: at 19:00

## 2021-11-14 RX ADMIN — Medication 1 PATCH: at 12:15

## 2021-11-14 RX ADMIN — PANTOPRAZOLE SODIUM 40 MILLIGRAM(S): 20 TABLET, DELAYED RELEASE ORAL at 06:03

## 2021-11-14 RX ADMIN — OXYCODONE AND ACETAMINOPHEN 2 TABLET(S): 5; 325 TABLET ORAL at 09:40

## 2021-11-14 RX ADMIN — Medication 40 MILLIGRAM(S): at 06:04

## 2021-11-14 RX ADMIN — AZITHROMYCIN 500 MILLIGRAM(S): 500 TABLET, FILM COATED ORAL at 12:17

## 2021-11-14 RX ADMIN — Medication 600 MILLIGRAM(S): at 17:13

## 2021-11-14 RX ADMIN — CEFTRIAXONE 100 MILLIGRAM(S): 500 INJECTION, POWDER, FOR SOLUTION INTRAMUSCULAR; INTRAVENOUS at 12:26

## 2021-11-14 RX ADMIN — BUDESONIDE AND FORMOTEROL FUMARATE DIHYDRATE 2 PUFF(S): 160; 4.5 AEROSOL RESPIRATORY (INHALATION) at 00:27

## 2021-11-14 RX ADMIN — ALBUTEROL 2 PUFF(S): 90 AEROSOL, METERED ORAL at 13:15

## 2021-11-14 RX ADMIN — Medication 12: at 08:45

## 2021-11-14 RX ADMIN — OXYCODONE AND ACETAMINOPHEN 2 TABLET(S): 5; 325 TABLET ORAL at 08:39

## 2021-11-14 RX ADMIN — Medication 12: at 12:09

## 2021-11-14 RX ADMIN — OXYCODONE AND ACETAMINOPHEN 2 TABLET(S): 5; 325 TABLET ORAL at 14:12

## 2021-11-14 RX ADMIN — Medication 1 DROP(S): at 12:19

## 2021-11-14 RX ADMIN — Medication 3 MILLIGRAM(S): at 21:21

## 2021-11-14 RX ADMIN — BUDESONIDE AND FORMOTEROL FUMARATE DIHYDRATE 2 PUFF(S): 160; 4.5 AEROSOL RESPIRATORY (INHALATION) at 09:03

## 2021-11-14 RX ADMIN — Medication 40 MILLIGRAM(S): at 21:21

## 2021-11-14 RX ADMIN — Medication 600 MILLIGRAM(S): at 05:56

## 2021-11-14 RX ADMIN — ALBUTEROL 2 PUFF(S): 90 AEROSOL, METERED ORAL at 21:20

## 2021-11-14 RX ADMIN — OXYCODONE AND ACETAMINOPHEN 2 TABLET(S): 5; 325 TABLET ORAL at 21:51

## 2021-11-14 RX ADMIN — Medication 0.5 MILLIGRAM(S): at 18:48

## 2021-11-14 RX ADMIN — Medication 40 MILLIGRAM(S): at 00:27

## 2021-11-14 RX ADMIN — Medication 15 UNIT(S): at 16:31

## 2021-11-14 RX ADMIN — OXYCODONE AND ACETAMINOPHEN 2 TABLET(S): 5; 325 TABLET ORAL at 21:21

## 2021-11-14 RX ADMIN — INSULIN GLARGINE 20 UNIT(S): 100 INJECTION, SOLUTION SUBCUTANEOUS at 21:21

## 2021-11-14 RX ADMIN — Medication 8: at 16:30

## 2021-11-14 RX ADMIN — BUDESONIDE AND FORMOTEROL FUMARATE DIHYDRATE 2 PUFF(S): 160; 4.5 AEROSOL RESPIRATORY (INHALATION) at 21:20

## 2021-11-14 RX ADMIN — Medication 81 MILLIGRAM(S): at 12:17

## 2021-11-14 RX ADMIN — Medication 3 MILLIGRAM(S): at 00:33

## 2021-11-14 RX ADMIN — ENOXAPARIN SODIUM 40 MILLIGRAM(S): 100 INJECTION SUBCUTANEOUS at 12:18

## 2021-11-14 RX ADMIN — OXYCODONE AND ACETAMINOPHEN 2 TABLET(S): 5; 325 TABLET ORAL at 13:11

## 2021-11-14 RX ADMIN — Medication 40 MILLIGRAM(S): at 13:10

## 2021-11-14 RX ADMIN — SENNA PLUS 2 TABLET(S): 8.6 TABLET ORAL at 00:27

## 2021-11-14 RX ADMIN — Medication 1 TABLET(S): at 12:17

## 2021-11-14 NOTE — PROGRESS NOTE ADULT - SUBJECTIVE AND OBJECTIVE BOX
Patient is a 61y old  Male who presents with a chief complaint of Cough, Shortness of breath (13 Nov 2021 13:12)    PATIENT IS SEEN AND EXAMINED IN MEDICAL FLOOR.    JAMILA [    ]    MT [   ]      GT [   ]    ALLERGIES:  No Known Allergies      Daily     Daily     VITALS:    Vital Signs Last 24 Hrs  T(C): 36.7 (14 Nov 2021 05:54), Max: 36.7 (14 Nov 2021 05:54)  T(F): 98.1 (14 Nov 2021 05:54), Max: 98.1 (14 Nov 2021 05:54)  HR: 96 (14 Nov 2021 05:54) (77 - 108)  BP: 113/69 (14 Nov 2021 05:54) (110/57 - 115/61)  BP(mean): --  RR: 18 (14 Nov 2021 05:54) (18 - 20)  SpO2: 96% (14 Nov 2021 05:54) (96% - 98%)    LABS:    CBC Full  -  ( 14 Nov 2021 07:31 )  WBC Count : 8.86 K/uL  RBC Count : 4.39 M/uL  Hemoglobin : 12.0 g/dL  Hematocrit : 38.8 %  Platelet Count - Automated : 212 K/uL  Mean Cell Volume : 88.4 fl  Mean Cell Hemoglobin : 27.3 pg  Mean Cell Hemoglobin Concentration : 30.9 gm/dL  Auto Neutrophil # : 7.58 K/uL  Auto Lymphocyte # : 0.91 K/uL  Auto Monocyte # : 0.27 K/uL  Auto Eosinophil # : 0.00 K/uL  Auto Basophil # : 0.01 K/uL  Auto Neutrophil % : 85.6 %  Auto Lymphocyte % : 10.3 %  Auto Monocyte % : 3.0 %  Auto Eosinophil % : 0.0 %  Auto Basophil % : 0.1 %      11-14    134<L>  |  94<L>  |  17  ----------------------------<  326<H>  4.9   |  38<H>  |  0.63    Ca    8.8      14 Nov 2021 07:31  Phos  4.2     11-14  Mg     2.2     11-14    TPro  6.4  /  Alb  2.9<L>  /  TBili  0.3  /  DBili  x   /  AST  4<L>  /  ALT  16  /  AlkPhos  79  11-13    CAPILLARY BLOOD GLUCOSE      POCT Blood Glucose.: 488 mg/dL (14 Nov 2021 08:43)  POCT Blood Glucose.: 319 mg/dL (14 Nov 2021 07:30)  POCT Blood Glucose.: 294 mg/dL (14 Nov 2021 02:02)  POCT Blood Glucose.: 381 mg/dL (13 Nov 2021 17:21)        LIVER FUNCTIONS - ( 13 Nov 2021 09:22 )  Alb: 2.9 g/dL / Pro: 6.4 g/dL / ALK PHOS: 79 U/L / ALT: 16 U/L DA / AST: 4 U/L / GGT: x           Creatinine Trend: 0.63<--, 0.62<--  I&O's Summary          Clean Catch Clean Catch (Midstream)  08-21 @ 22:02   >=3 organisms. Probable collection contamination.  --  --      .Blood Blood-Peripheral  08-21 @ 18:51   No Growth Final  --  --          MEDICATIONS:    MEDICATIONS  (STANDING):  artificial  tears Solution 1 Drop(s) Both EYES daily  aspirin enteric coated 81 milliGRAM(s) Oral daily  azithromycin   Tablet 500 milliGRAM(s) Oral daily  budesonide 160 MICROgram(s)/formoterol 4.5 MICROgram(s) Inhaler 2 Puff(s) Inhalation two times a day  cefTRIAXone   IVPB 1000 milliGRAM(s) IV Intermittent every 24 hours  enoxaparin Injectable 40 milliGRAM(s) SubCutaneous daily  furosemide    Tablet 20 milliGRAM(s) Oral daily  guaiFENesin  milliGRAM(s) Oral every 12 hours  insulin lispro (ADMELOG) corrective regimen sliding scale   SubCutaneous three times a day before meals  insulin lispro Injectable (ADMELOG). 6 Unit(s) SubCutaneous once  melatonin 3 milliGRAM(s) Oral at bedtime  methylPREDNISolone sodium succinate Injectable 40 milliGRAM(s) IV Push every 8 hours  multivitamin/minerals 1 Tablet(s) Oral daily  nicotine - 21 mG/24Hr(s) Patch 1 patch Transdermal daily  pantoprazole    Tablet 40 milliGRAM(s) Oral before breakfast  senna 2 Tablet(s) Oral at bedtime      MEDICATIONS  (PRN):  ALBUTerol    90 MICROgram(s) HFA Inhaler 2 Puff(s) Inhalation every 6 hours PRN Shortness of Breath and/or Wheezing  ALPRAZolam 0.5 milliGRAM(s) Oral two times a day PRN Anxiety  oxycodone    5 mG/acetaminophen 325 mG 2 Tablet(s) Oral every 4 hours PRN Severe Pain (7 - 10)  polyethylene glycol 3350 17 Gram(s) Oral daily PRN Constipation  simethicone 80 milliGRAM(s) Chew daily PRN Dyspepsia        REVIEW OF SYSTEMS:                           ALL ROS DONE [ X   ]      CONSTITUTIONAL:  LETHARGIC [   ], FEVER [   ], UNRESPONSIVE [   ]  CVS:  CP  [   ], SOB, [   ], PALPITATIONS [   ], DIZZYNESS [   ]  RS: COUGH [   ], SPUTUM [   ]  GI: ABDOMINAL PAIN [   ], NAUSEA [   ], VOMITINGS [   ], DIARRHEA [   ], CONSTIPATION [   ]  :  DYSURIA [   ], NOCTURIA [   ], INCREASED FREQUENCY [   ], DRIBLING [   ],  SKELETAL: PAINFUL JOINTS [   ], SWOLLEN JOINTS [   ], NECK ACHE [   ], LOW BACK ACHE [   ],  SKIN : ULCERS [   ], RASH [   ], ITCHING [   ]  CNS: HEAD ACHE [   ], DOUBLE VISION [   ], BLURRED VISION [   ], AMS / CONFUSION [   ], SEIZURES [   ], WEAKNESS [   ],TINGLING / NUMBNESS [   ]      PHYSICAL EXAMINATION:      GENERAL APPEARANCE: NO DISTRESS  HEENT:  NO PALLOR, NO  JVD,  NO   NODES, NECK SUPPLE  CVS: S1 +, S2 +,   RS: AEEB,  OCCASIONAL  RALES +,   B/L RONCHI ++  ABD: SOFT, NT, NO, BS +  EXT: PE +  SKIN: WARM,   SKELETAL:  ROM ACCEPTABLE  CNS:  AAO X 2-3   ,   DEFICITS        RADIOLOGY :    < from: Xray Chest 1 View- PORTABLE-Urgent (11.13.21 @ 09:50) >  INTERPRETATION:  AP supine chest on November 13, 2021 at 9:20 AM. Patient has chest pain.    Heart magnified by technique.    Prominent emre were better seen on August 21 of this year.    Lungs remain clear.    IMPRESSION: As above.    < end of copied text >          ASSESSMENT :     Chronic obstructive pulmonary disease with acute exacerbation    COPD (chronic obstructive pulmonary disease)    Stented coronary artery    HLD (hyperlipidemia)    Pulmonary HTN    Type 2 diabetes mellitus without complication, with long-term current use of insulin    Coronary artery disease involving native coronary artery of native heart without angina pectoris    Bipolar 1 disorder    Smoker    Gastroesophageal reflux disease, esophagitis presence not specified    Oxygen dependent    COPD (chronic obstructive pulmonary disease)    DM (diabetes mellitus)    CAD (coronary artery disease)    GERD (gastroesophageal reflux disease)    HLD (hyperlipidemia)    Insomnia    Polyarthritis        PLAN:  HPI:  Patient is a 60 y/o obese Male from [facility], active smoker, with medical hx significant for COPD (on 2-3 liters O2 daily, BiPAP nightly--noncompliant), previously intubated in Aug 2021, CAD s/p 1 stent (Aug 2017), Type 2 DM, HTN, TIA (2017), Chronic pain 2/2 RTA 2010 on Percocet, Anxiety, HLD, Moderate Pulm HTN, ?CHF (was volume overloaded on previous admission to ICU), p/w dry cough, increased shortness of breath over the last 1 week. Pt says he is always oxygen dependent for ambulation for the past 1.5 years, however he has had worsening dry cough and nasal congestion over the last few days associated increased dyspnea. C/o palpitations secondary to being anxious due to worsening SOB and decreased appetite. No c/o chest pain, leg pain or swelling, sick contacts, abdominal pain, n/v, diarrhea. Pt completed a 5 day course of ?Ciprofloxacin 500 mg for UTI last week (endorses dysuria has resolved).    In the ED, pt's vitals were  Afebrile, , /75, RR 16 on 3 L NC saturating 97% which was subsequently downtitrated to maintain target O2 88-92% in light of COPD hx  Cxray no focal consolidation noted  RVP negative, trop x 1 negative, EKG RBBB (not new), Sinus tachycardia 105 bpm, Twave inversions V1, 2, 3 (13 Nov 2021 13:12)      - COPD EXACERBATION, ACUTE ON CHRONIC HYPOXIC & HYPERCAPNOEIC RESPIRATORY FAILURE - STARTED ON IV STEROIDS, IV ANTIBIOTICS, NEBS,  BiPAP, O2 SUPPLEMENTS. PULMONARY CONSULT   - DM - CONTINUE HOME INSULIN 70/30, AND SLIDING SCALE INSULIN AS GIVEN AT HOME   - SMOKER, COUNSELLED TO QUIT SMOKING   - GI AND DVT PROPHYLAXIS    - DR. REFUGIO CABELLO      Patient is a 61y old  Male who presents with a chief complaint of Cough, Shortness of breath (13 Nov 2021 13:12)    PATIENT IS SEEN AND EXAMINED IN MEDICAL FLOOR.    JAMILA [    ]    MT [   ]      GT [   ]    ALLERGIES:  No Known Allergies      Daily     Daily     VITALS:    Vital Signs Last 24 Hrs  T(C): 36.7 (14 Nov 2021 05:54), Max: 36.7 (14 Nov 2021 05:54)  T(F): 98.1 (14 Nov 2021 05:54), Max: 98.1 (14 Nov 2021 05:54)  HR: 96 (14 Nov 2021 05:54) (77 - 108)  BP: 113/69 (14 Nov 2021 05:54) (110/57 - 115/61)  BP(mean): --  RR: 18 (14 Nov 2021 05:54) (18 - 20)  SpO2: 96% (14 Nov 2021 05:54) (96% - 98%)    LABS:    CBC Full  -  ( 14 Nov 2021 07:31 )  WBC Count : 8.86 K/uL  RBC Count : 4.39 M/uL  Hemoglobin : 12.0 g/dL  Hematocrit : 38.8 %  Platelet Count - Automated : 212 K/uL  Mean Cell Volume : 88.4 fl  Mean Cell Hemoglobin : 27.3 pg  Mean Cell Hemoglobin Concentration : 30.9 gm/dL  Auto Neutrophil # : 7.58 K/uL  Auto Lymphocyte # : 0.91 K/uL  Auto Monocyte # : 0.27 K/uL  Auto Eosinophil # : 0.00 K/uL  Auto Basophil # : 0.01 K/uL  Auto Neutrophil % : 85.6 %  Auto Lymphocyte % : 10.3 %  Auto Monocyte % : 3.0 %  Auto Eosinophil % : 0.0 %  Auto Basophil % : 0.1 %      11-14    134<L>  |  94<L>  |  17  ----------------------------<  326<H>  4.9   |  38<H>  |  0.63    Ca    8.8      14 Nov 2021 07:31  Phos  4.2     11-14  Mg     2.2     11-14    TPro  6.4  /  Alb  2.9<L>  /  TBili  0.3  /  DBili  x   /  AST  4<L>  /  ALT  16  /  AlkPhos  79  11-13    CAPILLARY BLOOD GLUCOSE      POCT Blood Glucose.: 488 mg/dL (14 Nov 2021 08:43)  POCT Blood Glucose.: 319 mg/dL (14 Nov 2021 07:30)  POCT Blood Glucose.: 294 mg/dL (14 Nov 2021 02:02)  POCT Blood Glucose.: 381 mg/dL (13 Nov 2021 17:21)        LIVER FUNCTIONS - ( 13 Nov 2021 09:22 )  Alb: 2.9 g/dL / Pro: 6.4 g/dL / ALK PHOS: 79 U/L / ALT: 16 U/L DA / AST: 4 U/L / GGT: x           Creatinine Trend: 0.63<--, 0.62<--  I&O's Summary          Clean Catch Clean Catch (Midstream)  08-21 @ 22:02   >=3 organisms. Probable collection contamination.  --  --      .Blood Blood-Peripheral  08-21 @ 18:51   No Growth Final  --  --          MEDICATIONS:    MEDICATIONS  (STANDING):  artificial  tears Solution 1 Drop(s) Both EYES daily  aspirin enteric coated 81 milliGRAM(s) Oral daily  azithromycin   Tablet 500 milliGRAM(s) Oral daily  budesonide 160 MICROgram(s)/formoterol 4.5 MICROgram(s) Inhaler 2 Puff(s) Inhalation two times a day  cefTRIAXone   IVPB 1000 milliGRAM(s) IV Intermittent every 24 hours  enoxaparin Injectable 40 milliGRAM(s) SubCutaneous daily  furosemide    Tablet 20 milliGRAM(s) Oral daily  guaiFENesin  milliGRAM(s) Oral every 12 hours  insulin lispro (ADMELOG) corrective regimen sliding scale   SubCutaneous three times a day before meals  insulin lispro Injectable (ADMELOG). 6 Unit(s) SubCutaneous once  melatonin 3 milliGRAM(s) Oral at bedtime  methylPREDNISolone sodium succinate Injectable 40 milliGRAM(s) IV Push every 8 hours  multivitamin/minerals 1 Tablet(s) Oral daily  nicotine - 21 mG/24Hr(s) Patch 1 patch Transdermal daily  pantoprazole    Tablet 40 milliGRAM(s) Oral before breakfast  senna 2 Tablet(s) Oral at bedtime      MEDICATIONS  (PRN):  ALBUTerol    90 MICROgram(s) HFA Inhaler 2 Puff(s) Inhalation every 6 hours PRN Shortness of Breath and/or Wheezing  ALPRAZolam 0.5 milliGRAM(s) Oral two times a day PRN Anxiety  oxycodone    5 mG/acetaminophen 325 mG 2 Tablet(s) Oral every 4 hours PRN Severe Pain (7 - 10)  polyethylene glycol 3350 17 Gram(s) Oral daily PRN Constipation  simethicone 80 milliGRAM(s) Chew daily PRN Dyspepsia        REVIEW OF SYSTEMS:                           ALL ROS DONE [ X   ]      CONSTITUTIONAL:  LETHARGIC [   ], FEVER [   ], UNRESPONSIVE [   ]  CVS:  CP  [   ], SOB, [   ], PALPITATIONS [   ], DIZZYNESS [   ]  RS: COUGH [   ], SPUTUM [   ]  GI: ABDOMINAL PAIN [   ], NAUSEA [   ], VOMITINGS [   ], DIARRHEA [   ], CONSTIPATION [   ]  :  DYSURIA [   ], NOCTURIA [   ], INCREASED FREQUENCY [   ], DRIBLING [   ],  SKELETAL: PAINFUL JOINTS [   ], SWOLLEN JOINTS [   ], NECK ACHE [   ], LOW BACK ACHE [   ],  SKIN : ULCERS [   ], RASH [   ], ITCHING [   ]  CNS: HEAD ACHE [   ], DOUBLE VISION [   ], BLURRED VISION [   ], AMS / CONFUSION [   ], SEIZURES [   ], WEAKNESS [   ],TINGLING / NUMBNESS [   ]      PHYSICAL EXAMINATION:      GENERAL APPEARANCE: NO DISTRESS  HEENT:  NO PALLOR, NO  JVD,  NO   NODES, NECK SUPPLE  CVS: S1 +, S2 +,   RS: AEEB,  OCCASIONAL  RALES +,   B/L RONCHI ++  ABD: SOFT, NT, NO, BS +  EXT: PE +  SKIN: WARM,   SKELETAL:  ROM ACCEPTABLE  CNS:  AAO X 2-3   ,   DEFICITS        RADIOLOGY :    < from: Xray Chest 1 View- PORTABLE-Urgent (11.13.21 @ 09:50) >  INTERPRETATION:  AP supine chest on November 13, 2021 at 9:20 AM. Patient has chest pain.    Heart magnified by technique.    Prominent emre were better seen on August 21 of this year.    Lungs remain clear.    IMPRESSION: As above.    < end of copied text >          ASSESSMENT :     Chronic obstructive pulmonary disease with acute exacerbation    COPD (chronic obstructive pulmonary disease)    Stented coronary artery    HLD (hyperlipidemia)    Pulmonary HTN    Type 2 diabetes mellitus without complication, with long-term current use of insulin    Coronary artery disease involving native coronary artery of native heart without angina pectoris    Bipolar 1 disorder    Smoker    Gastroesophageal reflux disease, esophagitis presence not specified    Oxygen dependent    COPD (chronic obstructive pulmonary disease)    DM (diabetes mellitus)    CAD (coronary artery disease)    GERD (gastroesophageal reflux disease)    HLD (hyperlipidemia)    Insomnia    Polyarthritis        PLAN:  HPI:  Patient is a 60 y/o obese Male from [facility], active smoker, with medical hx significant for COPD (on 2-3 liters O2 daily, BiPAP nightly--noncompliant), previously intubated in Aug 2021, CAD s/p 1 stent (Aug 2017), Type 2 DM, HTN, TIA (2017), Chronic pain 2/2 RTA 2010 on Percocet, Anxiety, HLD, Moderate Pulm HTN, ?CHF (was volume overloaded on previous admission to ICU), p/w dry cough, increased shortness of breath over the last 1 week. Pt says he is always oxygen dependent for ambulation for the past 1.5 years, however he has had worsening dry cough and nasal congestion over the last few days associated increased dyspnea. C/o palpitations secondary to being anxious due to worsening SOB and decreased appetite. No c/o chest pain, leg pain or swelling, sick contacts, abdominal pain, n/v, diarrhea. Pt completed a 5 day course of ?Ciprofloxacin 500 mg for UTI last week (endorses dysuria has resolved).    In the ED, pt's vitals were  Afebrile, , /75, RR 16 on 3 L NC saturating 97% which was subsequently downtitrated to maintain target O2 88-92% in light of COPD hx  Cxray no focal consolidation noted  RVP negative, trop x 1 negative, EKG RBBB (not new), Sinus tachycardia 105 bpm, Twave inversions V1, 2, 3 (13 Nov 2021 13:12)      - DC PALN TO [facility] - ECC IN AM IF STABLE.    - COPD EXACERBATION, ACUTE ON CHRONIC HYPOXIC & HYPERCAPNOEIC RESPIRATORY FAILURE - STARTED ON IV STEROIDS, IV ANTIBIOTICS, NEBS,  BiPAP, O2 SUPPLEMENTS. PULMONARY CONSULT     - UNCONTROLLED DM, GLUCOSE INTOLERANCE DUE TO STEROIDS  -  STARTED ON ON PREMEAL LISPRO 15 UNITS TID WITH MEALS, AND INJ. LANTUS 20 U Q HS. AND SLIDING SCALE INSULIN AS GIVEN AT HOME - WILL OBSERVE. D/W STAFF TO OBSERVE      - PATIENT HAS SUSPECTED DYSPHAGIA - SPEECH EVALUATION IS REQUESTED. NO CLINICAL EVIDENCE OF OROPHARYNGEAL CANDIDIASIS   - SMOKER, COUNSELLED TO QUIT SMOKING   - GI AND DVT PROPHYLAXIS    - DR. REFUGIO CABELLO

## 2021-11-14 NOTE — DISCHARGE NOTE PROVIDER - HOSPITAL COURSE
Patient is a 62 y/o obese Male from [facility], active smoker, with medical hx significant for COPD (on 2-3 liters O2 daily, BiPAP nightly--noncompliant), previously intubated in Aug 2021, CAD s/p 1 stent (Aug 2017), Type 2 DM, HTN, TIA (2017), Chronic pain 2/2 RTA 2010 on Percocet, Anxiety, HLD, Moderate Pulm HTN, ?CHF (was volume overloaded on previous admission to ICU), p/w dry cough, increased shortness of breath over the last 1 week. Pt says he is always oxygen dependent for ambulation for the past 1.5 years, however he has had worsening dry cough and nasal congestion over the last few days associated increased dyspnea. C/o palpitations secondary to being anxious due to worsening SOB and decreased appetite. No c/o chest pain, leg pain or swelling, sick contacts, abdominal pain, n/v, diarrhea. Pt completed a 5 day course of ?Ciprofloxacin 500 mg for UTI last week (endorses dysuria has resolved). In ED vitals were notable for HR - 105, normotensive, required 2 L nasal canula to keep SaO2 >90%. Admitted for COPD exacerbation S/p duonebs and 1 dose of 125 mg Solumedrol in ED. Started on Solumedrol 40 mg Q8hrs, Albuterol 2 puffs PRN Q6 hrs and Symbicort 2 puffs BID, Azithromycin PO x 3 days and IV Rocephin . DR Marquez from pulmonary consulted    INCOMPLETE       Patient is a 60 y/o obese Male from [facility], active smoker, with medical hx significant for COPD (on 2-3 liters O2 daily, BiPAP nightly--noncompliant), previously intubated in Aug 2021, CAD s/p 1 stent (Aug 2017), Type 2 DM, HTN, TIA (2017), Chronic pain 2/2 RTA 2010 on Percocet, Anxiety, HLD, Moderate Pulm HTN, ?CHF (was volume overloaded on previous admission to ICU), p/w dry cough, increased shortness of breath over the last 1 week. Pt says he is always oxygen dependent for ambulation for the past 1.5 years, however he has had worsening dry cough and nasal congestion over the last few days associated increased dyspnea. C/o palpitations secondary to being anxious due to worsening SOB and decreased appetite. No c/o chest pain, leg pain or swelling, sick contacts, abdominal pain, n/v, diarrhea. Pt completed a 5 day course of ?Ciprofloxacin 500 mg for UTI last week (endorses dysuria has resolved). In ED vitals were notable for HR - 105, normotensive, required 2 L nasal canula to keep SaO2 >90%. Admitted for COPD exacerbation S/p duonebs and 1 dose of 125 mg Solumedrol in ED. Started on Solumedrol 40 mg Q8hrs, Albuterol 2 puffs PRN Q6 hrs and Symbicort 2 puffs BID, Azithromycin PO x 3 days and IV Rocephin . DR Marquez from pulmonary consulted  IV steroids has been switched over to PO for a taper  Patient no longer has an SOB and is now ready for discharge to rehab facility.

## 2021-11-14 NOTE — CHART NOTE - NSCHARTNOTEFT_GEN_A_CORE
Patient reporting back pain . Patient has history of cheonic back pain and takes Percocet four times a day. Chart reviewed .Pain not relived by Percocet. Will give Lidocaine 4% patch . Pain management consulted . Will follow

## 2021-11-14 NOTE — DISCHARGE NOTE PROVIDER - CARE PROVIDER_API CALL
Raman Ramos)  Medicine  [id number] [place] [address]  Phone: [phone]  Fax: [phone]  Established Patient  Follow Up Time: 1-3 days

## 2021-11-14 NOTE — CONSULT NOTE ADULT - SUBJECTIVE AND OBJECTIVE BOX
Time of visit:    CHIEF COMPLAINT: Patient is a 61y old  Male who presents with a chief complaint of Cough, Shortness of breath (14 Nov 2021 17:50)      HPI:  Patient is a 60 y/o obese Male from [facility], active smoker, with medical hx significant for COPD (on 2-3 liters O2 daily, BiPAP nightly--noncompliant), previously intubated in Aug 2021, CAD s/p 1 stent (Aug 2017), Type 2 DM, HTN, TIA (2017), Chronic pain 2/2 RTA 2010 on Percocet, Anxiety, HLD, Moderate Pulm HTN, ?CHF (was volume overloaded on previous admission to ICU), p/w dry cough, increased shortness of breath over the last 1 week. Pt says he is always oxygen dependent for ambulation for the past 1.5 years, however he has had worsening dry cough and nasal congestion over the last few days associated increased dyspnea. C/o palpitations secondary to being anxious due to worsening SOB and decreased appetite. No c/o chest pain, leg pain or swelling, sick contacts, abdominal pain, n/v, diarrhea. Pt completed a 5 day course of ?Ciprofloxacin 500 mg for UTI last week (endorses dysuria has resolved).    In the ED, pt's vitals were  Afebrile, , /75, RR 16 on 3 L NC saturating 97% which was subsequently downtitrated to maintain target O2 88-92% in light of COPD hx  Cxray no focal consolidation noted  RVP negative, trop x 1 negative, EKG RBBB (not new), Sinus tachycardia 105 bpm, Twave inversions V1, 2, 3 (13 Nov 2021 13:12)   Patient seen and examined.     PAST MEDICAL & SURGICAL HISTORY:  COPD (chronic obstructive pulmonary disease)  Oxygen 2-3L at home    Stented coronary artery  2017    HLD (hyperlipidemia)    Pulmonary HTN    Type 2 diabetes mellitus without complication, with long-term current use of insulin    Coronary artery disease involving native coronary artery of native heart without angina pectoris    Smoker    Gastroesophageal reflux disease, esophagitis presence not specified    Oxygen dependent    DM (diabetes mellitus)    CAD (coronary artery disease)    GERD (gastroesophageal reflux disease)    Insomnia    No significant past surgical history        Allergies    No Known Allergies    Intolerances        MEDICATIONS  (STANDING):  artificial  tears Solution 1 Drop(s) Both EYES daily  aspirin enteric coated 81 milliGRAM(s) Oral daily  azithromycin   Tablet 500 milliGRAM(s) Oral daily  budesonide 160 MICROgram(s)/formoterol 4.5 MICROgram(s) Inhaler 2 Puff(s) Inhalation two times a day  cefTRIAXone   IVPB 1000 milliGRAM(s) IV Intermittent every 24 hours  dextrose 40% Gel 15 Gram(s) Oral once  dextrose 50% Injectable 25 Gram(s) IV Push once  dextrose 50% Injectable 12.5 Gram(s) IV Push once  dextrose 50% Injectable 25 Gram(s) IV Push once  enoxaparin Injectable 40 milliGRAM(s) SubCutaneous daily  furosemide    Tablet 20 milliGRAM(s) Oral daily  glucagon  Injectable 1 milliGRAM(s) IntraMuscular once  guaiFENesin  milliGRAM(s) Oral every 12 hours  insulin glargine Injectable (LANTUS) 20 Unit(s) SubCutaneous at bedtime  insulin lispro (ADMELOG) corrective regimen sliding scale   SubCutaneous three times a day before meals  insulin lispro Injectable (ADMELOG) 15 Unit(s) SubCutaneous three times a day before meals  insulin lispro Injectable (ADMELOG). 6 Unit(s) SubCutaneous once  lidocaine   4% Patch 1 Patch Transdermal daily  melatonin 3 milliGRAM(s) Oral at bedtime  methylPREDNISolone sodium succinate Injectable 40 milliGRAM(s) IV Push every 8 hours  multivitamin/minerals 1 Tablet(s) Oral daily  nicotine - 21 mG/24Hr(s) Patch 1 patch Transdermal daily  pantoprazole    Tablet 40 milliGRAM(s) Oral before breakfast  senna 2 Tablet(s) Oral at bedtime      MEDICATIONS  (PRN):  ALBUTerol    90 MICROgram(s) HFA Inhaler 2 Puff(s) Inhalation every 6 hours PRN Shortness of Breath and/or Wheezing  ALPRAZolam 0.5 milliGRAM(s) Oral two times a day PRN Anxiety  oxycodone    5 mG/acetaminophen 325 mG 2 Tablet(s) Oral every 4 hours PRN Severe Pain (7 - 10)  polyethylene glycol 3350 17 Gram(s) Oral daily PRN Constipation  simethicone 80 milliGRAM(s) Chew daily PRN Dyspepsia   Medications up to date at time of exam.    Medications up to date at time of exam.    FAMILY HISTORY:  No family history of chronic obstructive pulmonary disease    No family history of hypertension    No family history of mental disorder    FH: myocardial infarction (Mother)        SOCIAL HISTORY  Smoking History: [   ] smoking/smoke exposure, [   ] former smoker  Living Condition: [   ] apartment, [   ] private house  Work History:   Travel History: denies recent travel  Illicit Substance Use: denies  Alcohol Use: denies    REVIEW OF SYSTEMS:    CONSTITUTIONAL:  denies fevers, chills, sweats, weight loss    HEENT:  denies diplopia or blurred vision, sore throat or runny nose.    CARDIOVASCULAR:  denies pressure, squeezing, tightness, or heaviness about the chest; no palpitations.    RESPIRATORY:  denies SOB, cough, GIORDANO, wheezing.    GASTROINTESTINAL:  denies abdominal pain, nausea, vomiting or diarrhea.    GENITOURINARY: denies dysuria, frequency or urgency.    NEUROLOGIC:  denies numbness, tingling, seizures or weakness.    PSYCHIATRIC:  denies disorder of thought or mood.    MSK: denies swelling, redness      PHYSICAL EXAMINATION:    GENERAL: The patient is a well-developed, well-nourished, in no apparent distress.     Vital Signs Last 24 Hrs  T(C): 36.7 (14 Nov 2021 13:16), Max: 36.7 (14 Nov 2021 05:54)  T(F): 98.1 (14 Nov 2021 13:16), Max: 98.1 (14 Nov 2021 05:54)  HR: 76 (14 Nov 2021 13:16) (76 - 105)  BP: 104/67 (14 Nov 2021 13:16) (104/67 - 113/69)  BP(mean): --  RR: 18 (14 Nov 2021 13:16) (18 - 20)  SpO2: 95% (14 Nov 2021 13:16) (95% - 98%)   (if applicable)    Chest Tube (if applicable)    HEENT: Head is normocephalic and atraumatic. Extraocular muscles are intact. Mucous membranes are moist.     NECK: Supple, no palpable adenopathy.    LUNGS: Clear to auscultation, no wheezing, rales, or rhonchi.    HEART: Regular rate and rhythm without murmur.    ABDOMEN: Soft, nontender, and nondistended.  No hepatosplenomegaly is noted.    RENAL: No difficulty voiding, no pelvic pain    EXTREMITIES: Without any cyanosis, clubbing, rash, lesions or edema.    NEUROLOGIC: Awake, alert, oriented, grossly intact    SKIN: Warm, dry, good turgor.      LABS:                        12.0   8.86  )-----------( 212      ( 14 Nov 2021 07:31 )             38.8     11-14    134<L>  |  94<L>  |  17  ----------------------------<  326<H>  4.9   |  38<H>  |  0.63    Ca    8.8      14 Nov 2021 07:31  Phos  4.2     11-14  Mg     2.2     11-14    TPro  6.4  /  Alb  2.9<L>  /  TBili  0.3  /  DBili  x   /  AST  4<L>  /  ALT  16  /  AlkPhos  79  11-13                Serum Pro-Brain Natriuretic Peptide: 101 pg/mL (11-13-21 @ 09:22)      Procalcitonin, Serum: 0.07 ng/mL (11-13-21 @ 21:52)      MICROBIOLOGY: (if applicable)    RADIOLOGY & ADDITIONAL STUDIES:  EKG:   CXR:  ECHO:    IMPRESSION: 61y Male PAST MEDICAL & SURGICAL HISTORY:  COPD (chronic obstructive pulmonary disease)  Oxygen 2-3L at home    Stented coronary artery  2017    HLD (hyperlipidemia)    Pulmonary HTN    Type 2 diabetes mellitus without complication, with long-term current use of insulin    Coronary artery disease involving native coronary artery of native heart without angina pectoris    Smoker    Gastroesophageal reflux disease, esophagitis presence not specified    Oxygen dependent    DM (diabetes mellitus)    CAD (coronary artery disease)    GERD (gastroesophageal reflux disease)    Insomnia    No significant past surgical history     p/w                   RECOMMENDATIONS:   Time of visit:    CHIEF COMPLAINT: Patient is a 61y old  Male who presents with a chief complaint of Cough, Shortness of breath (14 Nov 2021 17:50)      HPI:  Patient is a 62 y/o obese Male from [facility], active smoker, with medical hx significant for COPD (on 2-3 liters O2 daily, BiPAP nightly--noncompliant), previously intubated in Aug 2021, CAD s/p 1 stent (Aug 2017), Type 2 DM, HTN, TIA (2017), Chronic pain 2/2 RTA 2010 on Percocet, Anxiety, HLD, Moderate Pulm HTN, ?CHF (was volume overloaded on previous admission to ICU), p/w dry cough, increased shortness of breath over the last 1 week. Pt says he is always oxygen dependent for ambulation for the past 1.5 years, however he has had worsening dry cough and nasal congestion over the last few days associated increased dyspnea. C/o palpitations secondary to being anxious due to worsening SOB and decreased appetite. No c/o chest pain, leg pain or swelling, sick contacts, abdominal pain, n/v, diarrhea. Pt completed a 5 day course of ?Ciprofloxacin 500 mg for UTI last week (endorses dysuria has resolved).    In the ED, pt's vitals were  Afebrile, , /75, RR 16 on 3 L NC saturating 97% which was subsequently downtitrated to maintain target O2 88-92% in light of COPD hx  Cxray no focal consolidation noted  RVP negative, trop x 1 negative, EKG RBBB (not new), Sinus tachycardia 105 bpm, Twave inversions V1, 2, 3 (13 Nov 2021 13:12)   Patient seen and examined.     PAST MEDICAL & SURGICAL HISTORY:  COPD (chronic obstructive pulmonary disease)  Oxygen 2-3L at home    Stented coronary artery  2017    HLD (hyperlipidemia)    Pulmonary HTN    Type 2 diabetes mellitus without complication, with long-term current use of insulin    Coronary artery disease involving native coronary artery of native heart without angina pectoris    Smoker    Gastroesophageal reflux disease, esophagitis presence not specified    Oxygen dependent    DM (diabetes mellitus)    CAD (coronary artery disease)    GERD (gastroesophageal reflux disease)    Insomnia    No significant past surgical history        Allergies    No Known Allergies    Intolerances        MEDICATIONS  (STANDING):  artificial  tears Solution 1 Drop(s) Both EYES daily  aspirin enteric coated 81 milliGRAM(s) Oral daily  azithromycin   Tablet 500 milliGRAM(s) Oral daily  budesonide 160 MICROgram(s)/formoterol 4.5 MICROgram(s) Inhaler 2 Puff(s) Inhalation two times a day  cefTRIAXone   IVPB 1000 milliGRAM(s) IV Intermittent every 24 hours  dextrose 40% Gel 15 Gram(s) Oral once  dextrose 50% Injectable 25 Gram(s) IV Push once  dextrose 50% Injectable 12.5 Gram(s) IV Push once  dextrose 50% Injectable 25 Gram(s) IV Push once  enoxaparin Injectable 40 milliGRAM(s) SubCutaneous daily  furosemide    Tablet 20 milliGRAM(s) Oral daily  glucagon  Injectable 1 milliGRAM(s) IntraMuscular once  guaiFENesin  milliGRAM(s) Oral every 12 hours  insulin glargine Injectable (LANTUS) 20 Unit(s) SubCutaneous at bedtime  insulin lispro (ADMELOG) corrective regimen sliding scale   SubCutaneous three times a day before meals  insulin lispro Injectable (ADMELOG) 15 Unit(s) SubCutaneous three times a day before meals  insulin lispro Injectable (ADMELOG). 6 Unit(s) SubCutaneous once  lidocaine   4% Patch 1 Patch Transdermal daily  melatonin 3 milliGRAM(s) Oral at bedtime  methylPREDNISolone sodium succinate Injectable 40 milliGRAM(s) IV Push every 8 hours  multivitamin/minerals 1 Tablet(s) Oral daily  nicotine - 21 mG/24Hr(s) Patch 1 patch Transdermal daily  pantoprazole    Tablet 40 milliGRAM(s) Oral before breakfast  senna 2 Tablet(s) Oral at bedtime      MEDICATIONS  (PRN):  ALBUTerol    90 MICROgram(s) HFA Inhaler 2 Puff(s) Inhalation every 6 hours PRN Shortness of Breath and/or Wheezing  ALPRAZolam 0.5 milliGRAM(s) Oral two times a day PRN Anxiety  oxycodone    5 mG/acetaminophen 325 mG 2 Tablet(s) Oral every 4 hours PRN Severe Pain (7 - 10)  polyethylene glycol 3350 17 Gram(s) Oral daily PRN Constipation  simethicone 80 milliGRAM(s) Chew daily PRN Dyspepsia   Medications up to date at time of exam.    Medications up to date at time of exam.    FAMILY HISTORY:  No family history of chronic obstructive pulmonary disease    No family history of hypertension    No family history of mental disorder    FH: myocardial infarction (Mother)        SOCIAL HISTORY  Smoking History: [x   ] smoking/smoke exposure, [   ] former smoker  Living Condition: [   ] apartment, [   ] private house  Work History:  retired   Travel History: denies recent travel  Illicit Substance Use: denies  Alcohol Use: denies    REVIEW OF SYSTEMS:    CONSTITUTIONAL:  denies fevers, chills, sweats, weight loss    HEENT:  denies diplopia or blurred vision, sore throat or runny nose.    CARDIOVASCULAR:  denies pressure, squeezing, tightness, or heaviness about the chest; no palpitations.    RESPIRATORY:  +  SOB, cough, GIORDANO, wheezing.    GASTROINTESTINAL:  denies abdominal pain, nausea, vomiting or diarrhea.    GENITOURINARY: denies dysuria, frequency or urgency.    NEUROLOGIC:  denies numbness, tingling, seizures or weakness.    PSYCHIATRIC:  denies disorder of thought or mood.    MSK: denies swelling, redness      PHYSICAL EXAMINATION:    GENERAL: The patient is a well-developed, well-nourished, in no apparent distress.     Vital Signs Last 24 Hrs  T(C): 36.7 (14 Nov 2021 13:16), Max: 36.7 (14 Nov 2021 05:54)  T(F): 98.1 (14 Nov 2021 13:16), Max: 98.1 (14 Nov 2021 05:54)  HR: 76 (14 Nov 2021 13:16) (76 - 105)  BP: 104/67 (14 Nov 2021 13:16) (104/67 - 113/69)  BP(mean): --  RR: 18 (14 Nov 2021 13:16) (18 - 20)  SpO2: 95% (14 Nov 2021 13:16) (95% - 98%)   (if applicable)    Chest Tube (if applicable)    HEENT: Head is normocephalic and atraumatic. Extraocular muscles are intact. Mucous membranes are moist.     NECK: Supple, no palpable adenopathy.    LUNGS: Clear to auscultation, no wheezing, rales, or rhonchi.    HEART: Regular rate and rhythm without murmur.    ABDOMEN: Soft, nontender, and nondistended.  No hepatosplenomegaly is noted.    RENAL: No difficulty voiding, no pelvic pain    EXTREMITIES: Without any cyanosis, clubbing, rash, lesions or edema.    NEUROLOGIC: Awake, alert, oriented, grossly intact    SKIN: Warm, dry, good turgor.      LABS:                        12.0   8.86  )-----------( 212      ( 14 Nov 2021 07:31 )             38.8     11-14    134<L>  |  94<L>  |  17  ----------------------------<  326<H>  4.9   |  38<H>  |  0.63    Ca    8.8      14 Nov 2021 07:31  Phos  4.2     11-14  Mg     2.2     11-14    TPro  6.4  /  Alb  2.9<L>  /  TBili  0.3  /  DBili  x   /  AST  4<L>  /  ALT  16  /  AlkPhos  79  11-13                Serum Pro-Brain Natriuretic Peptide: 101 pg/mL (11-13-21 @ 09:22)      Procalcitonin, Serum: 0.07 ng/mL (11-13-21 @ 21:52)      MICROBIOLOGY: (if applicable)    RADIOLOGY & ADDITIONAL STUDIES:  EKG:   CXR:< from: Xray Chest 1 View- PORTABLE-Urgent (11.13.21 @ 09:50) >    EXAM:  XR CHEST PORTABLE URGENT 1V                            PROCEDURE DATE:  11/13/2021          INTERPRETATION:  AP supine chest on November 13, 2021 at 9:20 AM. Patient has chest pain.    Heart magnified by technique.    Prominent emre were better seen on August 21 of this year.    Lungs remain clear.    IMPRESSION: As above.    --- End of Report ---            CHAU GARCIA MD; Attending Radiologist  This document has been electronically signed. Nov 13 2021 11:41AM    < end of copied text >    ECHO:    IMPRESSION: 61y Male PAST MEDICAL & SURGICAL HISTORY:  COPD (chronic obstructive pulmonary disease)  Oxygen 2-3L at home    Stented coronary artery  2017    HLD (hyperlipidemia)    Pulmonary HTN    Type 2 diabetes mellitus without complication, with long-term current use of insulin    Coronary artery disease involving native coronary artery of native heart without angina pectoris    Smoker    Gastroesophageal reflux disease, esophagitis presence not specified    Oxygen dependent    DM (diabetes mellitus)    CAD (coronary artery disease)    GERD (gastroesophageal reflux disease)    Insomnia    No significant past surgical history     p/w         IMP: This is a 61 yr old  obese Man  from [facility], active smoker, with medical hx significant for COPD (on 2-3 liters O2 daily, BiPAP nightly--noncompliant), previously intubated in Aug 2021, CAD s/p 1 stent (Aug 2017), Type 2 DM, HTN, TIA (2017), Chronic pain 2/2 RTA 2010 on Percocet, Anxiety, HLD, Moderate Pulm HTN, ?CHF (was volume overloaded on previous admission to ICU), p/w dry cough, increased shortness of breath over the last 1 week. Pat admitted for acute SOB due to active smoking     Sugg;  -continue O2 supp  -advise compliance with meds and CPAP devise  -solumedrol   -albuterol inhaler   -advise to stop smoking   -nicotine patch   -monitor blood sugar with coverage   -out pat pulmo f/u  -dvt/gi prophy

## 2021-11-14 NOTE — DISCHARGE NOTE PROVIDER - NSDCCPCAREPLAN_GEN_ALL_CORE_FT
PRINCIPAL DISCHARGE DIAGNOSIS  Diagnosis: COPD exacerbation  Assessment and Plan of Treatment: You came to the ER with complaints of worsening shortness of breath. You required oxygen supplementation via nasal canula to keep your saturation above 90 percent. You were given Duoneb nebulization treatment and Solumedrol IV in the ER. You were started on Solumedrol 40 mg IVP every 8 hours around the clock. Your status improved  Call your Health Care provider upon arrival home to make a follow up appointment within one week.  Take all inhalers as prescribed by your Health Care Provider.  Take steroids as prescribed by your Health Care Provider.  If your cough increases infrequency and severity and/or you have shortness of breath or increased shortness of breath call your Health Care Provider.  If you develop fever, chills, night sweats, malaise, and/or change in mental status call your Health care Provider.  Nutrition is very important.  Eat small frequent meals.  Increase your activity as tolerated.  Do not stay in bed all day         PRINCIPAL DISCHARGE DIAGNOSIS  Diagnosis: COPD exacerbation  Assessment and Plan of Treatment: You came to the ER with complaints of worsening shortness of breath. You required oxygen supplementation via nasal canula to keep your saturation above 90 percent. You were given Duoneb nebulization treatment and Solumedrol IV in the ER. You were started on Solumedrol 40 mg IVP every 8 hours around the clock. Your status improved  Call your Health Care provider upon arrival home to make a follow up appointment within one week.  Take all inhalers as prescribed by your Health Care Provider.  Take steroids as prescribed by your Health Care Provider.  If your cough increases infrequency and severity and/or you have shortness of breath or increased shortness of breath call your Health Care Provider.  If you develop fever, chills, night sweats, malaise, and/or change in mental status call your Health care Provider.  Nutrition is very important.  Eat small frequent meals.  Increase your activity as tolerated.  Do not stay in bed all day        SECONDARY DISCHARGE DIAGNOSES  Diagnosis: Diabetes mellitus  Assessment and Plan of Treatment: You have a histoy of Diabetes Mellitus.   Type 2 diabetes is a disease that affects how your body uses glucose (sugar). Either your body cannot make enough insulin, or it cannot use the insulin correctly. It is important to keep diabetes controlled to prevent damage to your heart, blood vessels, and other organs.  You should continue to take  your medications as prescribed and monitor your blood glucose levels.   You should work with your doctor or dietician to develop a meal plan that works for you and your schedule. A dietitian can help you learn how to eat the right amount of carbohydrates during your meals and snacks. Carbohydrates can raise your blood sugar if you eat too many at one time. Some foods that contain carbohydrates include breads, cereals, rice, pasta, and sweets.

## 2021-11-14 NOTE — DISCHARGE NOTE PROVIDER - NSDCMRMEDTOKEN_GEN_ALL_CORE_FT
ALPRAZolam 0.5 mg oral tablet: 1 tab(s) orally 2 times a day MDD:2  Artificial Tears ophthalmic solution: 1 dose(s) to each affected eye once a day  aspirin 81 mg oral tablet: 1 tab(s) orally once a day  DuoNeb 0.5 mg-2.5 mg/3 mL inhalation solution: 1 scoop(s) inhaled  furosemide 20 mg oral tablet: 1 tab(s) orally once a day  Melatonin 3 mg oral tablet: 1 tab(s) orally once a day (at bedtime)  MiraLax oral powder for reconstitution:   multivitamin with minerals:   nicotine 21 mg/24 hr transdermal film, extended release: 1 patch transdermal once a day  NovoLIN 70/30 subcutaneous suspension: 1  subcutaneous 2 times a day  oxycodone-acetaminophen 10 mg-325 mg oral tablet: 1 tab(s) orally every 6 hours, As Needed -for severe pain MDD:4  pantoprazole 40 mg oral delayed release tablet: 1 tab(s) orally once a day  senna oral tablet: 2 tab(s) orally once a day (at bedtime)  simethicone 80 mg oral tablet: 1 tab(s) orally 4 times a day (after meals and at bedtime)  Symbicort 160 mcg-4.5 mcg/inh inhalation aerosol: 2 puff(s) inhaled 2 times a day  traZODone 150 mg oral tablet: 1 tab(s) orally once a day (at bedtime)   ALPRAZolam 0.5 mg oral tablet: 1 tab(s) orally 2 times a day MDD:2  Artificial Tears ophthalmic solution: 1 dose(s) to each affected eye once a day  aspirin 81 mg oral tablet: 1 tab(s) orally once a day  DuoNeb 0.5 mg-2.5 mg/3 mL inhalation solution: 1 scoop(s) inhaled  furosemide 20 mg oral tablet: 1 tab(s) orally once a day  gabapentin 100 mg oral capsule: 1 cap(s) orally once a day (at bedtime)  lidocaine 4% topical film:  topically   Melatonin 3 mg oral tablet: 1 tab(s) orally once a day (at bedtime)  MiraLax oral powder for reconstitution:   multivitamin with minerals:   nicotine 21 mg/24 hr transdermal film, extended release: 1 patch transdermal once a day  NovoLIN 70/30 subcutaneous suspension: 1  subcutaneous 2 times a day  oxycodone-acetaminophen 10 mg-325 mg oral tablet: 1 tab(s) orally every 6 hours, As Needed -for severe pain MDD:4  pantoprazole 40 mg oral delayed release tablet: 1 tab(s) orally once a day  senna oral tablet: 2 tab(s) orally once a day (at bedtime)  simethicone 80 mg oral tablet: 1 tab(s) orally 4 times a day (after meals and at bedtime)  Symbicort 160 mcg-4.5 mcg/inh inhalation aerosol: 2 puff(s) inhaled 2 times a day  traZODone 150 mg oral tablet: 1 tab(s) orally once a day (at bedtime)   ALPRAZolam 0.5 mg oral tablet: 1 tab(s) orally 2 times a day MDD:2  Artificial Tears ophthalmic solution: 1 dose(s) to each affected eye once a day  aspirin 81 mg oral tablet: 1 tab(s) orally once a day  cefuroxime 500 mg oral tablet: 1 tab(s) orally 2 times a day   DuoNeb 0.5 mg-2.5 mg/3 mL inhalation solution: 1 scoop(s) inhaled  furosemide 20 mg oral tablet: 1 tab(s) orally once a day  gabapentin 100 mg oral capsule: 1 cap(s) orally every 12 hours  guaiFENesin 600 mg oral tablet, extended release: 1 tab(s) orally every 12 hours  insulin glargine: 20 unit(s) subcutaneous once a day (at bedtime)  lidocaine 4% topical film:  topically   Melatonin 3 mg oral tablet: 1 tab(s) orally once a day (at bedtime)  MiraLax oral powder for reconstitution:   multivitamin with minerals:   nicotine 21 mg/24 hr transdermal film, extended release: 1 patch transdermal once a day  oxycodone-acetaminophen 10 mg-325 mg oral tablet: 1 tab(s) orally every 6 hours, As Needed -for severe pain MDD:4  pantoprazole 40 mg oral delayed release tablet: 1 tab(s) orally once a day  predniSONE 10 mg oral tablet: 3 tab(s) orally once a day MDD:30mg  predniSONE 10 mg oral tablet: 1 tab(s) orally once a day MDD:10mg  predniSONE 20 mg oral tablet: 1 tab(s) orally once a day MDD:20mg  predniSONE 20 mg oral tablet: 2 tab(s) orally once a day MDD:40mg  senna oral tablet: 2 tab(s) orally once a day (at bedtime)  simethicone 80 mg oral tablet: 1 tab(s) orally 4 times a day (after meals and at bedtime)  Symbicort 160 mcg-4.5 mcg/inh inhalation aerosol: 2 puff(s) inhaled 2 times a day  traZODone 150 mg oral tablet: 1 tab(s) orally once a day (at bedtime)

## 2021-11-15 DIAGNOSIS — Z02.9 ENCOUNTER FOR ADMINISTRATIVE EXAMINATIONS, UNSPECIFIED: ICD-10-CM

## 2021-11-15 LAB
A1C WITH ESTIMATED AVERAGE GLUCOSE RESULT: 10.6 % — HIGH (ref 4–5.6)
ANION GAP SERPL CALC-SCNC: -2 MMOL/L — LOW (ref 5–17)
BASOPHILS # BLD AUTO: 0.01 K/UL — SIGNIFICANT CHANGE UP (ref 0–0.2)
BASOPHILS NFR BLD AUTO: 0.1 % — SIGNIFICANT CHANGE UP (ref 0–2)
BUN SERPL-MCNC: 21 MG/DL — HIGH (ref 7–18)
CALCIUM SERPL-MCNC: 8.9 MG/DL — SIGNIFICANT CHANGE UP (ref 8.4–10.5)
CHLORIDE SERPL-SCNC: 94 MMOL/L — LOW (ref 96–108)
CO2 SERPL-SCNC: 41 MMOL/L — HIGH (ref 22–31)
CREAT SERPL-MCNC: 0.64 MG/DL — SIGNIFICANT CHANGE UP (ref 0.5–1.3)
EOSINOPHIL # BLD AUTO: 0 K/UL — SIGNIFICANT CHANGE UP (ref 0–0.5)
EOSINOPHIL NFR BLD AUTO: 0 % — SIGNIFICANT CHANGE UP (ref 0–6)
ESTIMATED AVERAGE GLUCOSE: 258 MG/DL — HIGH (ref 68–114)
GLUCOSE BLDC GLUCOMTR-MCNC: 267 MG/DL — HIGH (ref 70–99)
GLUCOSE BLDC GLUCOMTR-MCNC: 351 MG/DL — HIGH (ref 70–99)
GLUCOSE BLDC GLUCOMTR-MCNC: 354 MG/DL — HIGH (ref 70–99)
GLUCOSE BLDC GLUCOMTR-MCNC: 379 MG/DL — HIGH (ref 70–99)
GLUCOSE SERPL-MCNC: 352 MG/DL — HIGH (ref 70–99)
HCT VFR BLD CALC: 37.8 % — LOW (ref 39–50)
HGB BLD-MCNC: 12.3 G/DL — LOW (ref 13–17)
IMM GRANULOCYTES NFR BLD AUTO: 0.8 % — SIGNIFICANT CHANGE UP (ref 0–1.5)
LYMPHOCYTES # BLD AUTO: 1.45 K/UL — SIGNIFICANT CHANGE UP (ref 1–3.3)
LYMPHOCYTES # BLD AUTO: 14.6 % — SIGNIFICANT CHANGE UP (ref 13–44)
MAGNESIUM SERPL-MCNC: 2.3 MG/DL — SIGNIFICANT CHANGE UP (ref 1.6–2.6)
MCHC RBC-ENTMCNC: 29.1 PG — SIGNIFICANT CHANGE UP (ref 27–34)
MCHC RBC-ENTMCNC: 32.5 GM/DL — SIGNIFICANT CHANGE UP (ref 32–36)
MCV RBC AUTO: 89.6 FL — SIGNIFICANT CHANGE UP (ref 80–100)
MONOCYTES # BLD AUTO: 0.52 K/UL — SIGNIFICANT CHANGE UP (ref 0–0.9)
MONOCYTES NFR BLD AUTO: 5.2 % — SIGNIFICANT CHANGE UP (ref 2–14)
NEUTROPHILS # BLD AUTO: 7.86 K/UL — HIGH (ref 1.8–7.4)
NEUTROPHILS NFR BLD AUTO: 79.3 % — HIGH (ref 43–77)
NRBC # BLD: 0 /100 WBCS — SIGNIFICANT CHANGE UP (ref 0–0)
PHOSPHATE SERPL-MCNC: 4.4 MG/DL — SIGNIFICANT CHANGE UP (ref 2.5–4.5)
PLATELET # BLD AUTO: 223 K/UL — SIGNIFICANT CHANGE UP (ref 150–400)
POTASSIUM SERPL-MCNC: 4.9 MMOL/L — SIGNIFICANT CHANGE UP (ref 3.5–5.3)
POTASSIUM SERPL-SCNC: 4.9 MMOL/L — SIGNIFICANT CHANGE UP (ref 3.5–5.3)
RBC # BLD: 4.22 M/UL — SIGNIFICANT CHANGE UP (ref 4.2–5.8)
RBC # FLD: 13.1 % — SIGNIFICANT CHANGE UP (ref 10.3–14.5)
SODIUM SERPL-SCNC: 133 MMOL/L — LOW (ref 135–145)
WBC # BLD: 9.92 K/UL — SIGNIFICANT CHANGE UP (ref 3.8–10.5)
WBC # FLD AUTO: 9.92 K/UL — SIGNIFICANT CHANGE UP (ref 3.8–10.5)

## 2021-11-15 PROCEDURE — 99222 1ST HOSP IP/OBS MODERATE 55: CPT

## 2021-11-15 RX ORDER — INSULIN LISPRO 100/ML
VIAL (ML) SUBCUTANEOUS
Refills: 0 | Status: DISCONTINUED | OUTPATIENT
Start: 2021-11-15 | End: 2021-11-16

## 2021-11-15 RX ORDER — GABAPENTIN 400 MG/1
100 CAPSULE ORAL AT BEDTIME
Refills: 0 | Status: DISCONTINUED | OUTPATIENT
Start: 2021-11-15 | End: 2021-11-16

## 2021-11-15 RX ADMIN — ALBUTEROL 2 PUFF(S): 90 AEROSOL, METERED ORAL at 12:05

## 2021-11-15 RX ADMIN — Medication 6: at 17:10

## 2021-11-15 RX ADMIN — Medication 1 TABLET(S): at 12:04

## 2021-11-15 RX ADMIN — Medication 10: at 08:08

## 2021-11-15 RX ADMIN — Medication 0.5 MILLIGRAM(S): at 05:28

## 2021-11-15 RX ADMIN — Medication 15 UNIT(S): at 08:08

## 2021-11-15 RX ADMIN — OXYCODONE AND ACETAMINOPHEN 2 TABLET(S): 5; 325 TABLET ORAL at 14:18

## 2021-11-15 RX ADMIN — Medication 15 UNIT(S): at 12:00

## 2021-11-15 RX ADMIN — OXYCODONE AND ACETAMINOPHEN 2 TABLET(S): 5; 325 TABLET ORAL at 05:28

## 2021-11-15 RX ADMIN — BUDESONIDE AND FORMOTEROL FUMARATE DIHYDRATE 2 PUFF(S): 160; 4.5 AEROSOL RESPIRATORY (INHALATION) at 10:05

## 2021-11-15 RX ADMIN — AZITHROMYCIN 500 MILLIGRAM(S): 500 TABLET, FILM COATED ORAL at 12:04

## 2021-11-15 RX ADMIN — Medication 600 MILLIGRAM(S): at 05:23

## 2021-11-15 RX ADMIN — Medication 10: at 12:00

## 2021-11-15 RX ADMIN — Medication 15 UNIT(S): at 17:10

## 2021-11-15 RX ADMIN — ENOXAPARIN SODIUM 40 MILLIGRAM(S): 100 INJECTION SUBCUTANEOUS at 12:04

## 2021-11-15 RX ADMIN — Medication 20 MILLIGRAM(S): at 05:23

## 2021-11-15 RX ADMIN — Medication 1 DROP(S): at 12:04

## 2021-11-15 RX ADMIN — PANTOPRAZOLE SODIUM 40 MILLIGRAM(S): 20 TABLET, DELAYED RELEASE ORAL at 05:27

## 2021-11-15 RX ADMIN — OXYCODONE AND ACETAMINOPHEN 2 TABLET(S): 5; 325 TABLET ORAL at 11:48

## 2021-11-15 RX ADMIN — OXYCODONE AND ACETAMINOPHEN 2 TABLET(S): 5; 325 TABLET ORAL at 21:07

## 2021-11-15 RX ADMIN — Medication 10: at 21:46

## 2021-11-15 RX ADMIN — OXYCODONE AND ACETAMINOPHEN 2 TABLET(S): 5; 325 TABLET ORAL at 10:48

## 2021-11-15 RX ADMIN — Medication 81 MILLIGRAM(S): at 12:04

## 2021-11-15 RX ADMIN — Medication 0.5 MILLIGRAM(S): at 19:48

## 2021-11-15 RX ADMIN — OXYCODONE AND ACETAMINOPHEN 2 TABLET(S): 5; 325 TABLET ORAL at 15:38

## 2021-11-15 RX ADMIN — Medication 40 MILLIGRAM(S): at 17:14

## 2021-11-15 RX ADMIN — Medication 600 MILLIGRAM(S): at 17:12

## 2021-11-15 RX ADMIN — Medication 3 MILLIGRAM(S): at 21:03

## 2021-11-15 RX ADMIN — OXYCODONE AND ACETAMINOPHEN 2 TABLET(S): 5; 325 TABLET ORAL at 19:05

## 2021-11-15 RX ADMIN — GABAPENTIN 100 MILLIGRAM(S): 400 CAPSULE ORAL at 21:03

## 2021-11-15 RX ADMIN — Medication 40 MILLIGRAM(S): at 05:23

## 2021-11-15 RX ADMIN — CEFTRIAXONE 100 MILLIGRAM(S): 500 INJECTION, POWDER, FOR SOLUTION INTRAMUSCULAR; INTRAVENOUS at 12:05

## 2021-11-15 RX ADMIN — OXYCODONE AND ACETAMINOPHEN 2 TABLET(S): 5; 325 TABLET ORAL at 05:58

## 2021-11-15 RX ADMIN — INSULIN GLARGINE 20 UNIT(S): 100 INJECTION, SOLUTION SUBCUTANEOUS at 21:03

## 2021-11-15 RX ADMIN — BUDESONIDE AND FORMOTEROL FUMARATE DIHYDRATE 2 PUFF(S): 160; 4.5 AEROSOL RESPIRATORY (INHALATION) at 21:03

## 2021-11-15 NOTE — CONSULT NOTE ADULT - PROBLEM SELECTOR RECOMMENDATION 9
Pt with chronic back pain and is on percocet 2 tbs po q 6 hours as outpt.  Pt is also on chronic benzo.  Currently admitted for copd exacerbation.  nonopioid recc  - solumedrol as ordered  - no nsaids    - lidocaine patch daily  opioid recc  - percocet 2 tabs po q 4 hours prn ( as ordered)  bowel regimen  -senna and miralax  Continue home dose of xanax  OOB/PT

## 2021-11-15 NOTE — PROGRESS NOTE ADULT - PROBLEM SELECTOR PLAN 2
-  Patient has a history of Diabetes mellitus  -  takes Novolin 70/30 as per sliding scale  -  Currently started on moderate dose sliding scale. Would calculate 24 hr requirement and start standing Lantus tomorrow given pt is on IV steroids  -  A1c July 2021 6.5

## 2021-11-15 NOTE — PROGRESS NOTE ADULT - SUBJECTIVE AND OBJECTIVE BOX
Patient is a 61y old  Male who presents with a chief complaint of Cough, Shortness of breath (15 Nov 2021 16:31)    PATIENT IS SEEN AND EXAMINED IN MEDICAL FLOOR.  EMIT [    ]    MT [   ]      GT [   ]    ALLERGIES:  No Known Allergies      Daily     Daily     VITALS:    Vital Signs Last 24 Hrs  T(C): 36.3 (15 Nov 2021 12:50), Max: 36.6 (14 Nov 2021 20:30)  T(F): 97.3 (15 Nov 2021 12:50), Max: 97.9 (15 Nov 2021 05:29)  HR: 76 (15 Nov 2021 12:50) (71 - 89)  BP: 109/64 (15 Nov 2021 12:50) (109/57 - 115/65)  BP(mean): --  RR: 18 (15 Nov 2021 12:50) (18 - 18)  SpO2: 95% (15 Nov 2021 12:50) (95% - 98%)    LABS:    CBC Full  -  ( 15 Nov 2021 06:38 )  WBC Count : 9.92 K/uL  RBC Count : 4.22 M/uL  Hemoglobin : 12.3 g/dL  Hematocrit : 37.8 %  Platelet Count - Automated : 223 K/uL  Mean Cell Volume : 89.6 fl  Mean Cell Hemoglobin : 29.1 pg  Mean Cell Hemoglobin Concentration : 32.5 gm/dL  Auto Neutrophil # : 7.86 K/uL  Auto Lymphocyte # : 1.45 K/uL  Auto Monocyte # : 0.52 K/uL  Auto Eosinophil # : 0.00 K/uL  Auto Basophil # : 0.01 K/uL  Auto Neutrophil % : 79.3 %  Auto Lymphocyte % : 14.6 %  Auto Monocyte % : 5.2 %  Auto Eosinophil % : 0.0 %  Auto Basophil % : 0.1 %      11-15    133<L>  |  94<L>  |  21<H>  ----------------------------<  352<H>  4.9   |  41<H>  |  0.64    Ca    8.9      15 Nov 2021 06:38  Phos  4.4     11-15  Mg     2.3     11-15      CAPILLARY BLOOD GLUCOSE      POCT Blood Glucose.: 267 mg/dL (15 Nov 2021 16:37)  POCT Blood Glucose.: 351 mg/dL (15 Nov 2021 11:13)  POCT Blood Glucose.: 354 mg/dL (15 Nov 2021 07:29)  POCT Blood Glucose.: 413 mg/dL (14 Nov 2021 21:11)          Creatinine Trend: 0.64<--, 0.63<--, 0.62<--  I&O's Summary          .Blood Blood-Venous  11-14 @ 17:15   No growth to date.  --  --      .Blood Blood-Venous  11-14 @ 17:13   No growth to date.  --  --      Clean Catch Clean Catch (Midstream)  08-21 @ 22:02   >=3 organisms. Probable collection contamination.  --  --      .Blood Blood-Peripheral  08-21 @ 18:51   No Growth Final  --  --          MEDICATIONS:    MEDICATIONS  (STANDING):  artificial  tears Solution 1 Drop(s) Both EYES daily  aspirin enteric coated 81 milliGRAM(s) Oral daily  azithromycin   Tablet 500 milliGRAM(s) Oral daily  budesonide 160 MICROgram(s)/formoterol 4.5 MICROgram(s) Inhaler 2 Puff(s) Inhalation two times a day  cefTRIAXone   IVPB 1000 milliGRAM(s) IV Intermittent every 24 hours  dextrose 40% Gel 15 Gram(s) Oral once  dextrose 50% Injectable 25 Gram(s) IV Push once  dextrose 50% Injectable 12.5 Gram(s) IV Push once  dextrose 50% Injectable 25 Gram(s) IV Push once  enoxaparin Injectable 40 milliGRAM(s) SubCutaneous daily  furosemide    Tablet 20 milliGRAM(s) Oral daily  glucagon  Injectable 1 milliGRAM(s) IntraMuscular once  guaiFENesin  milliGRAM(s) Oral every 12 hours  insulin glargine Injectable (LANTUS) 20 Unit(s) SubCutaneous at bedtime  insulin lispro (ADMELOG) corrective regimen sliding scale   SubCutaneous three times a day before meals  insulin lispro Injectable (ADMELOG) 15 Unit(s) SubCutaneous three times a day before meals  insulin lispro Injectable (ADMELOG). 6 Unit(s) SubCutaneous once  lidocaine   4% Patch 1 Patch Transdermal daily  melatonin 3 milliGRAM(s) Oral at bedtime  methylPREDNISolone sodium succinate Injectable 40 milliGRAM(s) IV Push every 12 hours  multivitamin/minerals 1 Tablet(s) Oral daily  nicotine - 21 mG/24Hr(s) Patch 1 patch Transdermal daily  pantoprazole    Tablet 40 milliGRAM(s) Oral before breakfast  senna 2 Tablet(s) Oral at bedtime      MEDICATIONS  (PRN):  ALBUTerol    90 MICROgram(s) HFA Inhaler 2 Puff(s) Inhalation every 6 hours PRN Shortness of Breath and/or Wheezing  ALPRAZolam 0.5 milliGRAM(s) Oral two times a day PRN Anxiety  oxycodone    5 mG/acetaminophen 325 mG 2 Tablet(s) Oral every 4 hours PRN Severe Pain (7 - 10)  polyethylene glycol 3350 17 Gram(s) Oral daily PRN Constipation  simethicone 80 milliGRAM(s) Chew daily PRN Dyspepsia      REVIEW OF SYSTEMS:                           ALL ROS DONE [ X   ]    CONSTITUTIONAL:  LETHARGIC [   ], FEVER [   ], UNRESPONSIVE [   ]  CVS:  CP  [   ], SOB, [   ], PALPITATIONS [   ], DIZZYNESS [   ]  RS: COUGH [   ], SPUTUM [   ]  GI: ABDOMINAL PAIN [   ], NAUSEA [   ], VOMITINGS [   ], DIARRHEA [   ], CONSTIPATION [   ]  :  DYSURIA [   ], NOCTURIA [   ], INCREASED FREQUENCY [   ], DRIBLING [   ],  SKELETAL: PAINFUL JOINTS [   ], SWOLLEN JOINTS [   ], NECK ACHE [   ], LOW BACK ACHE [   ],  SKIN : ULCERS [   ], RASH [   ], ITCHING [   ]  CNS: HEAD ACHE [   ], DOUBLE VISION [   ], BLURRED VISION [   ], AMS / CONFUSION [   ], SEIZURES [   ], WEAKNESS [   ],TINGLING / NUMBNESS [   ]    PHYSICAL EXAMINATION:      GENERAL APPEARANCE: NO DISTRESS  HEENT:  NO PALLOR, NO  JVD,  NO   NODES, NECK SUPPLE  CVS: S1 +, S2 +,   RS: AEEB,  OCCASIONAL  RALES +,   B/L RONCHI ++  ABD: SOFT, NT, NO, BS +  EXT: PE +  SKIN: WARM,   SKELETAL:  ROM ACCEPTABLE  CNS:  AAO X 2-3   ,   DEFICITS        RADIOLOGY :    < from: Xray Chest 1 View- PORTABLE-Urgent (11.13.21 @ 09:50) >  INTERPRETATION:  AP supine chest on November 13, 2021 at 9:20 AM. Patient has chest pain.    Heart magnified by technique.    Prominent emre were better seen on August 21 of this year.    Lungs remain clear.    IMPRESSION: As above.    < end of copied text >          ASSESSMENT :     Chronic obstructive pulmonary disease with acute exacerbation    COPD (chronic obstructive pulmonary disease)    Stented coronary artery    HLD (hyperlipidemia)    Pulmonary HTN    Type 2 diabetes mellitus without complication, with long-term current use of insulin    Coronary artery disease involving native coronary artery of native heart without angina pectoris    Bipolar 1 disorder    Smoker    Gastroesophageal reflux disease, esophagitis presence not specified    Oxygen dependent    COPD (chronic obstructive pulmonary disease)    DM (diabetes mellitus)    CAD (coronary artery disease)    GERD (gastroesophageal reflux disease)    HLD (hyperlipidemia)    Insomnia    Polyarthritis        PLAN:  HPI:  Patient is a 60 y/o obese Male from [facility], active smoker, with medical hx significant for COPD (on 2-3 liters O2 daily, BiPAP nightly--noncompliant), previously intubated in Aug 2021, CAD s/p 1 stent (Aug 2017), Type 2 DM, HTN, TIA (2017), Chronic pain 2/2 RTA 2010 on Percocet, Anxiety, HLD, Moderate Pulm HTN, ?CHF (was volume overloaded on previous admission to ICU), p/w dry cough, increased shortness of breath over the last 1 week. Pt says he is always oxygen dependent for ambulation for the past 1.5 years, however he has had worsening dry cough and nasal congestion over the last few days associated increased dyspnea. C/o palpitations secondary to being anxious due to worsening SOB and decreased appetite. No c/o chest pain, leg pain or swelling, sick contacts, abdominal pain, n/v, diarrhea. Pt completed a 5 day course of ?Ciprofloxacin 500 mg for UTI last week (endorses dysuria has resolved).    In the ED, pt's vitals were  Afebrile, , /75, RR 16 on 3 L NC saturating 97% which was subsequently downtitrated to maintain target O2 88-92% in light of COPD hx  Cxray no focal consolidation noted  RVP negative, trop x 1 negative, EKG RBBB (not new), Sinus tachycardia 105 bpm, Twave inversions V1, 2, 3 (13 Nov 2021 13:12)      - DC PLAN TO [facility] - ECC IN AM IF STABLE. NOTED PT EVAL.    - COPD EXACERBATION, ACUTE ON CHRONIC HYPOXIC & HYPERCAPNOEIC RESPIRATORY FAILURE - STARTED ON IV STEROIDS [TAPERING], IV ANTIBIOTICS, NEBS,  BiPAP, O2 SUPPLEMENTS. PULMONARY CONSULT     - UNCONTROLLED DM, GLUCOSE INTOLERANCE DUE TO STEROIDS  -  STARTED ON ON PREMEAL LISPRO 15 UNITS TID WITH MEALS, AND INJ. LANTUS 20 U Q HS. AND SLIDING SCALE INSULIN AS GIVEN AT HOME - WILL OBSERVE. D/W STAFF TO OBSERVE      - PATIENT HAS SUSPECTED DYSPHAGIA - SPEECH EVALUATION IS REQUESTED. NO CLINICAL EVIDENCE OF OROPHARYNGEAL CANDIDIASIS   - SMOKER, COUNSELLED TO QUIT SMOKING   - GI AND DVT PROPHYLAXIS

## 2021-11-15 NOTE — PROGRESS NOTE ADULT - PROBLEM SELECTOR PLAN 3
-  Patient  has a history  of CAD s/p 1 stent in 2017  -  EKG RBBB (not new), T wave inversions V1-V3  -  Trop x 1 negative, f/u 2nd trop, pt has no c/o chest pain  -  Continue with  ASA and statin

## 2021-11-15 NOTE — PHYSICAL THERAPY INITIAL EVALUATION ADULT - PREDICTED DURATION OF THERAPY (DAYS/WKS), PT EVAL
no back pain, no gout, no musculoskeletal pain, no neck pain, and no weakness. 3-5x a week x 6-8 weeks in Sub Acute Rehab

## 2021-11-15 NOTE — CHART NOTE - NSCHARTNOTEFT_GEN_A_CORE
EVENT: POCT Blood Glucose.: 435 mg/dL (11-15-21 @ 22:42)  POCT Blood Glucose.: 379 mg/dL (11-15-21 @ 20:55)  POCT Blood Glucose.: 267 mg/dL (11-15-21 @ 16:37)  POCT Blood Glucose.: 351 mg/dL (11-15-21 @ 11:13)  POCT Blood Glucose.: 354 mg/dL (11-15-21 @ 07:29)  POCT Blood Glucose.: 413 mg/dL (11-14-21 @ 21:11)  POCT Blood Glucose.: 323 mg/dL (11-14-21 @ 16:07)  POCT Blood Glucose.: 406 mg/dL (11-14-21 @ 11:17)  POCT Blood Glucose.: 488 mg/dL (11-14-21 @ 08:43)  POCT Blood Glucose.: 319 mg/dL (11-14-21 @ 07:30)  POCT Blood Glucose.: 294 mg/dL (11-14-21 @ 02:02)  POCT Blood Glucose.: 381 mg/dL (11-13-21 @ 17:21)  POCT Blood Glucose.: 268 mg/dL (11-13-21 @ 09:17)    BRIEF HPI: 62 y/o obese Male from [facility], active smoker, with medical hx significant for COPD (on 2-3 liters O2 daily, BiPAP nightly), CAD s/p 1 stent (Aug 2017), Type 2 DM, HTN, TIA (2017), Chronic pain 2/2 RTA 2010 on Percocet, Anxiety, HLD, Moderate Pulm HTN, p/w dry cough, increased shortness of breath over the last 1 week, admitted for COPD Exacerbation    OBJECTIVE:  Vital Signs Last 24 Hrs  T(C): 36.3 (15 Nov 2021 20:28), Max: 36.6 (15 Nov 2021 05:29)  T(F): 97.4 (15 Nov 2021 20:28), Max: 97.9 (15 Nov 2021 05:29)  HR: 73 (15 Nov 2021 20:28) (71 - 79)  BP: 133/75 (15 Nov 2021 20:28) (109/57 - 133/75)  BP(mean): --  RR: 18 (15 Nov 2021 20:28) (18 - 18)  SpO2: 95% (15 Nov 2021 20:28) (95% - 98%)    FOCUSED PHYSICAL EXAM:  NEURO: Alert, oriented X 4  RESP: Even, unlabored  CV: S1 S2    LABS:                        12.3   9.92  )-----------( 223      ( 15 Nov 2021 06:38 )             37.8     11-15    133<L>  |  94<L>  |  21<H>  ----------------------------<  352<H>  4.9   |  41<H>  |  0.64    Ca    8.9      15 Nov 2021 06:38  Phos  4.4     11-15  Mg     2.3     11-15    A1C with Estimated Average Glucose (11.14.21 @ 11:10) A1C with Estimated Average Glucose Result: 10.6:  mg/dL    PROBLEM: Uncontrolled BG related to Type 2 DM  PLAN:   1. Cont insulin glargine Injectable (LANTUS) 20 Unit(s) Subcutaneous at bedtime  2  Insulin lispro (ADMELOG) corrective regimen sliding scale Subcutaneous Before meals and at bedtime  3. Cont insulin lispro Injectable (ADMELOG) 15 Unit(s) Subcutaneous three times a day before meals  4. Patient teaching re dietary management  5. F/u with endocrinology    FOLLOW UP / RESULT: Trend POCT EVENT: POCT Blood Glucose.: 435 mg/dL (11-15-21 @ 22:42)  POCT Blood Glucose.: 379 mg/dL (11-15-21 @ 20:55)  POCT Blood Glucose.: 267 mg/dL (11-15-21 @ 16:37)  POCT Blood Glucose.: 351 mg/dL (11-15-21 @ 11:13)  POCT Blood Glucose.: 354 mg/dL (11-15-21 @ 07:29)  POCT Blood Glucose.: 413 mg/dL (11-14-21 @ 21:11)  POCT Blood Glucose.: 323 mg/dL (11-14-21 @ 16:07)  POCT Blood Glucose.: 406 mg/dL (11-14-21 @ 11:17)  POCT Blood Glucose.: 488 mg/dL (11-14-21 @ 08:43)  POCT Blood Glucose.: 319 mg/dL (11-14-21 @ 07:30)  POCT Blood Glucose.: 294 mg/dL (11-14-21 @ 02:02)  POCT Blood Glucose.: 381 mg/dL (11-13-21 @ 17:21)  POCT Blood Glucose.: 268 mg/dL (11-13-21 @ 09:17)    BRIEF HPI: 62 y/o obese Male from [facility], active smoker, with medical hx significant for COPD (on 2-3 liters O2 daily, BiPAP nightly), CAD s/p 1 stent (Aug 2017), Type 2 DM, HTN, TIA (2017), Chronic pain 2/2 RTA 2010 on Percocet, Anxiety, HLD, Moderate Pulm HTN, p/w dry cough, increased shortness of breath over the last 1 week, admitted for COPD Exacerbation    OBJECTIVE:  Vital Signs Last 24 Hrs  T(C): 36.3 (15 Nov 2021 20:28), Max: 36.6 (15 Nov 2021 05:29)  T(F): 97.4 (15 Nov 2021 20:28), Max: 97.9 (15 Nov 2021 05:29)  HR: 73 (15 Nov 2021 20:28) (71 - 79)  BP: 133/75 (15 Nov 2021 20:28) (109/57 - 133/75)  BP(mean): --  RR: 18 (15 Nov 2021 20:28) (18 - 18)  SpO2: 95% (15 Nov 2021 20:28) (95% - 98%)    FOCUSED PHYSICAL EXAM:  NEURO: Alert, oriented X 4  RESP: Even, unlabored  CV: S1 S2    LABS:                        12.3   9.92  )-----------( 223      ( 15 Nov 2021 06:38 )             37.8     11-15    133<L>  |  94<L>  |  21<H>  ----------------------------<  352<H>  4.9   |  41<H>  |  0.64    Ca    8.9      15 Nov 2021 06:38  Phos  4.4     11-15  Mg     2.3     11-15    A1C with Estimated Average Glucose (11.14.21 @ 11:10) A1C with Estimated Average Glucose Result: 10.6:  mg/dL    PROBLEM: Uncontrolled BG related to Type 2 DM Vs steroids  PLAN:   1. Cont insulin glargine Injectable (LANTUS) 20 Unit(s) Subcutaneous at bedtime  2  Insulin lispro (ADMELOG) corrective regimen sliding scale Subcutaneous Before meals and at bedtime  3. Cont insulin lispro Injectable (ADMELOG) 15 Unit(s) Subcutaneous three times a day before meals  4. Patient teaching re dietary management  5. F/u with endocrinology    FOLLOW UP / RESULT: Trend POCT

## 2021-11-15 NOTE — PHYSICAL THERAPY INITIAL EVALUATION ADULT - IMPAIRMENTS CONTRIBUTING TO GAIT DEVIATIONS, PT EVAL
[email or web address]/. Enter [phone] impaired balance/decreased strength

## 2021-11-15 NOTE — PROGRESS NOTE ADULT - PROBLEM SELECTOR PLAN 4
-  Patient takes Percocet PRN daily due to chronic back pain 2/2 RTA many years ago, on bowel regimen secondary to pain medications.  -  Continue with Percocet 2 tabs Q4 hrs PRN for severe pain, Miralax and senna PRN.

## 2021-11-15 NOTE — PROGRESS NOTE ADULT - SUBJECTIVE AND OBJECTIVE BOX
Patient is a 60 y/o obese Male from [facility], active smoker, with medical hx significant for COPD (on 2-3 liters O2 daily, BiPAP nightly), CAD s/p 1 stent (Aug 2017), Type 2 DM, HTN, TIA (2017), Chronic pain 2/2 RTA 2010 on Percocet, Anxiety, HLD, Moderate Pulm HTN, p/w dry cough, increased shortness of breath over the last 1 week, admitted for COPD Exacerbation    Patient seen and examined at bedside.  No complaints voiced      MEDICATIONS  (STANDING):  artificial  tears Solution 1 Drop(s) Both EYES daily  aspirin enteric coated 81 milliGRAM(s) Oral daily  azithromycin   Tablet 500 milliGRAM(s) Oral daily  budesonide 160 MICROgram(s)/formoterol 4.5 MICROgram(s) Inhaler 2 Puff(s) Inhalation two times a day  cefTRIAXone   IVPB 1000 milliGRAM(s) IV Intermittent every 24 hours  dextrose 40% Gel 15 Gram(s) Oral once  dextrose 50% Injectable 25 Gram(s) IV Push once  dextrose 50% Injectable 12.5 Gram(s) IV Push once  dextrose 50% Injectable 25 Gram(s) IV Push once  enoxaparin Injectable 40 milliGRAM(s) SubCutaneous daily  furosemide    Tablet 20 milliGRAM(s) Oral daily  glucagon  Injectable 1 milliGRAM(s) IntraMuscular once  guaiFENesin  milliGRAM(s) Oral every 12 hours  insulin glargine Injectable (LANTUS) 20 Unit(s) SubCutaneous at bedtime  insulin lispro (ADMELOG) corrective regimen sliding scale   SubCutaneous three times a day before meals  insulin lispro Injectable (ADMELOG) 15 Unit(s) SubCutaneous three times a day before meals  insulin lispro Injectable (ADMELOG). 6 Unit(s) SubCutaneous once  lidocaine   4% Patch 1 Patch Transdermal daily  melatonin 3 milliGRAM(s) Oral at bedtime  methylPREDNISolone sodium succinate Injectable 40 milliGRAM(s) IV Push every 12 hours  multivitamin/minerals 1 Tablet(s) Oral daily  nicotine - 21 mG/24Hr(s) Patch 1 patch Transdermal daily  pantoprazole    Tablet 40 milliGRAM(s) Oral before breakfast  senna 2 Tablet(s) Oral at bedtime    MEDICATIONS  (PRN):  ALBUTerol    90 MICROgram(s) HFA Inhaler 2 Puff(s) Inhalation every 6 hours PRN Shortness of Breath and/or Wheezing  ALPRAZolam 0.5 milliGRAM(s) Oral two times a day PRN Anxiety  oxycodone    5 mG/acetaminophen 325 mG 2 Tablet(s) Oral every 4 hours PRN Severe Pain (7 - 10)  polyethylene glycol 3350 17 Gram(s) Oral daily PRN Constipation  simethicone 80 milliGRAM(s) Chew daily PRN Dyspepsia      REVIEW OF SYSTEMS  General:  No fevers or chills	  Respiratory and Thorax:  no SOB  Cardiovascular:	no chest pain, palpitatins  Gastrointestinal:	  Genitourinary:	  Musculoskeletal:	  Neurological:	  Psychiatric:	   Patient is a 60 y/o obese Male from [facility], active smoker, with medical hx significant for COPD (on 2-3 liters O2 daily, BiPAP nightly), CAD s/p 1 stent (Aug 2017), Type 2 DM, HTN, TIA (2017), Chronic pain 2/2 RTA 2010 on Percocet, Anxiety, HLD, Moderate Pulm HTN, p/w dry cough, increased shortness of breath over the last 1 week, admitted for COPD Exacerbation    Patient seen and examined at bedside.  No complaints voiced      MEDICATIONS  (STANDING):  artificial  tears Solution 1 Drop(s) Both EYES daily  aspirin enteric coated 81 milliGRAM(s) Oral daily  azithromycin   Tablet 500 milliGRAM(s) Oral daily  budesonide 160 MICROgram(s)/formoterol 4.5 MICROgram(s) Inhaler 2 Puff(s) Inhalation two times a day  cefTRIAXone   IVPB 1000 milliGRAM(s) IV Intermittent every 24 hours  dextrose 40% Gel 15 Gram(s) Oral once  dextrose 50% Injectable 25 Gram(s) IV Push once  dextrose 50% Injectable 12.5 Gram(s) IV Push once  dextrose 50% Injectable 25 Gram(s) IV Push once  enoxaparin Injectable 40 milliGRAM(s) SubCutaneous daily  furosemide    Tablet 20 milliGRAM(s) Oral daily  glucagon  Injectable 1 milliGRAM(s) IntraMuscular once  guaiFENesin  milliGRAM(s) Oral every 12 hours  insulin glargine Injectable (LANTUS) 20 Unit(s) SubCutaneous at bedtime  insulin lispro (ADMELOG) corrective regimen sliding scale   SubCutaneous three times a day before meals  insulin lispro Injectable (ADMELOG) 15 Unit(s) SubCutaneous three times a day before meals  insulin lispro Injectable (ADMELOG). 6 Unit(s) SubCutaneous once  lidocaine   4% Patch 1 Patch Transdermal daily  melatonin 3 milliGRAM(s) Oral at bedtime  methylPREDNISolone sodium succinate Injectable 40 milliGRAM(s) IV Push every 12 hours  multivitamin/minerals 1 Tablet(s) Oral daily  nicotine - 21 mG/24Hr(s) Patch 1 patch Transdermal daily  pantoprazole    Tablet 40 milliGRAM(s) Oral before breakfast  senna 2 Tablet(s) Oral at bedtime    MEDICATIONS  (PRN):  ALBUTerol    90 MICROgram(s) HFA Inhaler 2 Puff(s) Inhalation every 6 hours PRN Shortness of Breath and/or Wheezing  ALPRAZolam 0.5 milliGRAM(s) Oral two times a day PRN Anxiety  oxycodone    5 mG/acetaminophen 325 mG 2 Tablet(s) Oral every 4 hours PRN Severe Pain (7 - 10)  polyethylene glycol 3350 17 Gram(s) Oral daily PRN Constipation  simethicone 80 milliGRAM(s) Chew daily PRN Dyspepsia      REVIEW OF SYSTEMS  General:  No fevers or chills	  Respiratory and Thorax:  no SOB  Cardiovascular:	no chest pain, palpitatins  Gastrointestinal:	 no abdominal pain, nausea or vomiting  Genitourinary:	No dysuria, hematuria, urgency  Musculoskeletal:	  no musculoskeletal pain  Neurological:  no headache, dizziness or lightheadedness	      Vital Signs Last 24 Hrs  T(C): 36.3 (15 Nov 2021 12:50), Max: 36.6 (14 Nov 2021 20:30)  T(F): 97.3 (15 Nov 2021 12:50), Max: 97.9 (15 Nov 2021 05:29)  HR: 76 (15 Nov 2021 12:50) (71 - 89)  BP: 109/64 (15 Nov 2021 12:50) (109/57 - 115/65)  BP(mean): --  RR: 18 (15 Nov 2021 12:50) (18 - 18)  SpO2: 95% (15 Nov 2021 12:50) (95% - 98%)    PHYSICAL EXAM:      Constitutional:  Eyes:  ENMT:  Neck:  Breasts:  Back:  Respiratory:  Cardiovascular:  Gastrointestinal:  Genitourinary:  Rectal:  Extremities:  Vascular:  Neurological:  Skin:  Lymph Nodes:  Musculoskeletal:  Psychiatric:     Patient is a 60 y/o obese Male from [facility], active smoker, with medical hx significant for COPD (on 2-3 liters O2 daily, BiPAP nightly), CAD s/p 1 stent (Aug 2017), Type 2 DM, HTN, TIA (2017), Chronic pain 2/2 RTA 2010 on Percocet, Anxiety, HLD, Moderate Pulm HTN, p/w dry cough, increased shortness of breath over the last 1 week, admitted for COPD Exacerbation    Patient seen and examined at bedside.  No complaints voiced      MEDICATIONS  (STANDING):  artificial  tears Solution 1 Drop(s) Both EYES daily  aspirin enteric coated 81 milliGRAM(s) Oral daily  azithromycin   Tablet 500 milliGRAM(s) Oral daily  budesonide 160 MICROgram(s)/formoterol 4.5 MICROgram(s) Inhaler 2 Puff(s) Inhalation two times a day  cefTRIAXone   IVPB 1000 milliGRAM(s) IV Intermittent every 24 hours  dextrose 40% Gel 15 Gram(s) Oral once  dextrose 50% Injectable 25 Gram(s) IV Push once  dextrose 50% Injectable 12.5 Gram(s) IV Push once  dextrose 50% Injectable 25 Gram(s) IV Push once  enoxaparin Injectable 40 milliGRAM(s) SubCutaneous daily  furosemide    Tablet 20 milliGRAM(s) Oral daily  glucagon  Injectable 1 milliGRAM(s) IntraMuscular once  guaiFENesin  milliGRAM(s) Oral every 12 hours  insulin glargine Injectable (LANTUS) 20 Unit(s) SubCutaneous at bedtime  insulin lispro (ADMELOG) corrective regimen sliding scale   SubCutaneous three times a day before meals  insulin lispro Injectable (ADMELOG) 15 Unit(s) SubCutaneous three times a day before meals  insulin lispro Injectable (ADMELOG). 6 Unit(s) SubCutaneous once  lidocaine   4% Patch 1 Patch Transdermal daily  melatonin 3 milliGRAM(s) Oral at bedtime  methylPREDNISolone sodium succinate Injectable 40 milliGRAM(s) IV Push every 12 hours  multivitamin/minerals 1 Tablet(s) Oral daily  nicotine - 21 mG/24Hr(s) Patch 1 patch Transdermal daily  pantoprazole    Tablet 40 milliGRAM(s) Oral before breakfast  senna 2 Tablet(s) Oral at bedtime    MEDICATIONS  (PRN):  ALBUTerol    90 MICROgram(s) HFA Inhaler 2 Puff(s) Inhalation every 6 hours PRN Shortness of Breath and/or Wheezing  ALPRAZolam 0.5 milliGRAM(s) Oral two times a day PRN Anxiety  oxycodone    5 mG/acetaminophen 325 mG 2 Tablet(s) Oral every 4 hours PRN Severe Pain (7 - 10)  polyethylene glycol 3350 17 Gram(s) Oral daily PRN Constipation  simethicone 80 milliGRAM(s) Chew daily PRN Dyspepsia      REVIEW OF SYSTEMS  General:  No fevers or chills	  Respiratory and Thorax:  no SOB  Cardiovascular:	no chest pain, palpitatins  Gastrointestinal:	 no abdominal pain, nausea or vomiting  Genitourinary:	No dysuria, hematuria, urgency  Musculoskeletal:	  no musculoskeletal pain  Neurological:  no headache, dizziness or lightheadedness	      Vital Signs Last 24 Hrs  T(C): 36.3 (15 Nov 2021 12:50), Max: 36.6 (14 Nov 2021 20:30)  T(F): 97.3 (15 Nov 2021 12:50), Max: 97.9 (15 Nov 2021 05:29)  HR: 76 (15 Nov 2021 12:50) (71 - 89)  BP: 109/64 (15 Nov 2021 12:50) (109/57 - 115/65)  BP(mean): --  RR: 18 (15 Nov 2021 12:50) (18 - 18)  SpO2: 95% (15 Nov 2021 12:50) (95% - 98%)    PHYSICAL EXAM:  Constitutional:  No acute distress  ENMT:  moist mucous membranes  Neck:  supple,  no JVD noted  Respiratory:  clear bilaterally.  no rales, wheezes or rhonchi  Cardiovascular:  regular rate and rhythm  Gastrointestinal:  abdomen soft and non tender  Neurological:  alert and oriented x 4  Skin:  dry,  warm to touch  Musculoskeletal:  no musculoskeletal pain  Psychiatric:  behavior appropriate to situation                            12.3   9.92  )-----------( 223      ( 15 Nov 2021 06:38 )             37.8       11-15    133<L>  |  94<L>  |  21<H>  ----------------------------<  352<H>  4.9   |  41<H>  |  0.64    Ca    8.9      15 Nov 2021 06:38  Phos  4.4     11-15  Mg     2.3     11-15      CAPILLARY BLOOD GLUCOSE      POCT Blood Glucose.: 267 mg/dL (15 Nov 2021 16:37)  POCT Blood Glucose.: 351 mg/dL (15 Nov 2021 11:13)  POCT Blood Glucose.: 354 mg/dL (15 Nov 2021 07:29)  POCT Blood Glucose.: 413 mg/dL (14 Nov 2021 21:11)

## 2021-11-15 NOTE — PROGRESS NOTE ADULT - SUBJECTIVE AND OBJECTIVE BOX
Time of Visit:  Patient seen and examined.     MEDICATIONS  (STANDING):  artificial  tears Solution 1 Drop(s) Both EYES daily  aspirin enteric coated 81 milliGRAM(s) Oral daily  azithromycin   Tablet 500 milliGRAM(s) Oral daily  budesonide 160 MICROgram(s)/formoterol 4.5 MICROgram(s) Inhaler 2 Puff(s) Inhalation two times a day  cefTRIAXone   IVPB 1000 milliGRAM(s) IV Intermittent every 24 hours  dextrose 40% Gel 15 Gram(s) Oral once  dextrose 50% Injectable 25 Gram(s) IV Push once  dextrose 50% Injectable 12.5 Gram(s) IV Push once  dextrose 50% Injectable 25 Gram(s) IV Push once  enoxaparin Injectable 40 milliGRAM(s) SubCutaneous daily  furosemide    Tablet 20 milliGRAM(s) Oral daily  glucagon  Injectable 1 milliGRAM(s) IntraMuscular once  guaiFENesin  milliGRAM(s) Oral every 12 hours  insulin glargine Injectable (LANTUS) 20 Unit(s) SubCutaneous at bedtime  insulin lispro (ADMELOG) corrective regimen sliding scale   SubCutaneous three times a day before meals  insulin lispro Injectable (ADMELOG) 15 Unit(s) SubCutaneous three times a day before meals  insulin lispro Injectable (ADMELOG). 6 Unit(s) SubCutaneous once  lidocaine   4% Patch 1 Patch Transdermal daily  melatonin 3 milliGRAM(s) Oral at bedtime  methylPREDNISolone sodium succinate Injectable 40 milliGRAM(s) IV Push every 8 hours  multivitamin/minerals 1 Tablet(s) Oral daily  nicotine - 21 mG/24Hr(s) Patch 1 patch Transdermal daily  pantoprazole    Tablet 40 milliGRAM(s) Oral before breakfast  senna 2 Tablet(s) Oral at bedtime      MEDICATIONS  (PRN):  ALBUTerol    90 MICROgram(s) HFA Inhaler 2 Puff(s) Inhalation every 6 hours PRN Shortness of Breath and/or Wheezing  ALPRAZolam 0.5 milliGRAM(s) Oral two times a day PRN Anxiety  oxycodone    5 mG/acetaminophen 325 mG 2 Tablet(s) Oral every 4 hours PRN Severe Pain (7 - 10)  polyethylene glycol 3350 17 Gram(s) Oral daily PRN Constipation  simethicone 80 milliGRAM(s) Chew daily PRN Dyspepsia       Medications up to date at time of exam.    ROS; No fever, chills, congestion. Has mild SOB on walking activity with PT therapist .  Has non productive cough.   PHYSICAL EXAMINATION:  Vital Signs Last 24 Hrs  T(C): 36.6 (15 Nov 2021 05:29), Max: 36.7 (14 Nov 2021 13:16)  T(F): 97.9 (15 Nov 2021 05:29), Max: 98.1 (14 Nov 2021 13:16)  HR: 73 (15 Nov 2021 08:02) (71 - 89)  BP: 114/66 (15 Nov 2021 05:29) (104/67 - 115/65)  BP(mean): --  RR: 18 (15 Nov 2021 05:29) (18 - 18)  SpO2: 97% (15 Nov 2021 08:02) (95% - 98%)   (if applicable)    General: Alert and oriented. Able to answer question at rest with no SOB. No acute distress.      HEENT: Head is normocephalic and atraumatic. Extraocular muscles are intact. Mucous membranes are moist.     NECK: Supple, no palpable adenopathy.    LUNGS: Fair air entrance. Non labored. Few rales. No wheezing. No use of accessory muscle.      HEART: S1 S2 Regular rate and no click/ rub.     ABDOMEN: Soft, nontender, and nondistended.  Active bowel sounds.     EXTREMITIES: Without any cyanosis, clubbing, rash, lesions or edema.    NEUROLOGIC: Awake, alert, oriented.     SKIN: Warm and moist. Non diaphoretic.       LABS:                        12.3   9.92  )-----------( 223      ( 15 Nov 2021 06:38 )             37.8     11-15    133<L>  |  94<L>  |  21<H>  ----------------------------<  352<H>  4.9   |  41<H>  |  0.64    Ca    8.9      15 Nov 2021 06:38  Phos  4.4     11-15  Mg     2.3     11-15                  Serum Pro-Brain Natriuretic Peptide: 101 pg/mL (11-13-21 @ 09:22)      Procalcitonin, Serum: 0.07 ng/mL (11-13-21 @ 21:52)      MICROBIOLOGY: (if applicable)    RADIOLOGY & ADDITIONAL STUDIES:  EKG:   CXR:  ECHO:    IMPRESSION: 61y Male PAST MEDICAL & SURGICAL HISTORY:  COPD (chronic obstructive pulmonary disease)  Oxygen 2-3L at home    Stented coronary artery  2017    HLD (hyperlipidemia)    Pulmonary HTN    Type 2 diabetes mellitus without complication, with long-term current use of insulin    Coronary artery disease involving native coronary artery of native heart without angina pectoris    Smoker    Gastroesophageal reflux disease, esophagitis presence not specified    Oxygen dependent    DM (diabetes mellitus)    CAD (coronary artery disease)    GERD (gastroesophageal reflux disease)    Insomnia    No significant past surgical history     Impression: 60 Y/O male from [facility] presented to ED with worsening dry Cough, Nasal congestion  and worsening SOB x 1 week. An active smoker. Has  COPD (on 2-3 liters O2 daily, BiPAP nightly--noncompliant at [facility] ), previously intubated in Aug 2021. SOB, Cough due to Acute on chronic hypoxic hypercapnic respiratory failure secondary to COPD exacerbation . Negative Covid 19 swab and Negative RSV.     Suggestion:  O2 saturation 94% room air. Has SOB on exertion . Continue Oxygen supplementation 2L-3L NC when off PAP.  Reinforced the importance of Daily compliance to Bipap. Continue Bipap 12/6 FIo2 40% at Night.    Reinforced the importance of smoking cessation. On Nicotine patch on x 12 Hours.   Continue PRN Albuterol 2 Puffs Q 6 Hours.  Continue Budesonide 2 Puffs Twice Daily.  Continue Solumedrol 40 mg IVP Q 8 Hours x 2 days then taper to Q 12 Hours  x 2 Days . Then start on Prednisone 40 mg oral Daily x 5 Days then 20 mg oral daily x 5 Days.   DVT/ GI prophylactic .      Time of Visit:  Patient seen and examined.     MEDICATIONS  (STANDING):  artificial  tears Solution 1 Drop(s) Both EYES daily  aspirin enteric coated 81 milliGRAM(s) Oral daily  azithromycin   Tablet 500 milliGRAM(s) Oral daily  budesonide 160 MICROgram(s)/formoterol 4.5 MICROgram(s) Inhaler 2 Puff(s) Inhalation two times a day  cefTRIAXone   IVPB 1000 milliGRAM(s) IV Intermittent every 24 hours  dextrose 40% Gel 15 Gram(s) Oral once  dextrose 50% Injectable 25 Gram(s) IV Push once  dextrose 50% Injectable 12.5 Gram(s) IV Push once  dextrose 50% Injectable 25 Gram(s) IV Push once  enoxaparin Injectable 40 milliGRAM(s) SubCutaneous daily  furosemide    Tablet 20 milliGRAM(s) Oral daily  glucagon  Injectable 1 milliGRAM(s) IntraMuscular once  guaiFENesin  milliGRAM(s) Oral every 12 hours  insulin glargine Injectable (LANTUS) 20 Unit(s) SubCutaneous at bedtime  insulin lispro (ADMELOG) corrective regimen sliding scale   SubCutaneous three times a day before meals  insulin lispro Injectable (ADMELOG) 15 Unit(s) SubCutaneous three times a day before meals  insulin lispro Injectable (ADMELOG). 6 Unit(s) SubCutaneous once  lidocaine   4% Patch 1 Patch Transdermal daily  melatonin 3 milliGRAM(s) Oral at bedtime  methylPREDNISolone sodium succinate Injectable 40 milliGRAM(s) IV Push every 8 hours  multivitamin/minerals 1 Tablet(s) Oral daily  nicotine - 21 mG/24Hr(s) Patch 1 patch Transdermal daily  pantoprazole    Tablet 40 milliGRAM(s) Oral before breakfast  senna 2 Tablet(s) Oral at bedtime      MEDICATIONS  (PRN):  ALBUTerol    90 MICROgram(s) HFA Inhaler 2 Puff(s) Inhalation every 6 hours PRN Shortness of Breath and/or Wheezing  ALPRAZolam 0.5 milliGRAM(s) Oral two times a day PRN Anxiety  oxycodone    5 mG/acetaminophen 325 mG 2 Tablet(s) Oral every 4 hours PRN Severe Pain (7 - 10)  polyethylene glycol 3350 17 Gram(s) Oral daily PRN Constipation  simethicone 80 milliGRAM(s) Chew daily PRN Dyspepsia       Medications up to date at time of exam.    ROS; No fever, chills, congestion. Has mild SOB on walking activity with PT therapist .  Has non productive cough.   PHYSICAL EXAMINATION:  Vital Signs Last 24 Hrs  T(C): 36.6 (15 Nov 2021 05:29), Max: 36.7 (14 Nov 2021 13:16)  T(F): 97.9 (15 Nov 2021 05:29), Max: 98.1 (14 Nov 2021 13:16)  HR: 73 (15 Nov 2021 08:02) (71 - 89)  BP: 114/66 (15 Nov 2021 05:29) (104/67 - 115/65)  BP(mean): --  RR: 18 (15 Nov 2021 05:29) (18 - 18)  SpO2: 97% (15 Nov 2021 08:02) (95% - 98%)   (if applicable)    General: Alert and oriented. Able to answer question at rest with no SOB. No acute distress.      HEENT: Head is normocephalic and atraumatic. Extraocular muscles are intact. Mucous membranes are moist.     NECK: Supple, no palpable adenopathy.    LUNGS: Fair air entrance. Non labored. Few rales. No wheezing. No use of accessory muscle.      HEART: S1 S2 Regular rate and no click/ rub.     ABDOMEN: Soft, nontender, and nondistended.  Active bowel sounds.     EXTREMITIES: Without any cyanosis, clubbing, rash, lesions or edema.    NEUROLOGIC: Awake, alert, oriented.     SKIN: Warm and moist. Non diaphoretic.       LABS:                        12.3   9.92  )-----------( 223      ( 15 Nov 2021 06:38 )             37.8     11-15    133<L>  |  94<L>  |  21<H>  ----------------------------<  352<H>  4.9   |  41<H>  |  0.64    Ca    8.9      15 Nov 2021 06:38  Phos  4.4     11-15  Mg     2.3     11-15                  Serum Pro-Brain Natriuretic Peptide: 101 pg/mL (11-13-21 @ 09:22)      Procalcitonin, Serum: 0.07 ng/mL (11-13-21 @ 21:52)      MICROBIOLOGY: (if applicable)    RADIOLOGY & ADDITIONAL STUDIES:  EKG:   CXR:  ECHO:    IMPRESSION: 61y Male PAST MEDICAL & SURGICAL HISTORY:  COPD (chronic obstructive pulmonary disease)  Oxygen 2-3L at home    Stented coronary artery  2017    HLD (hyperlipidemia)    Pulmonary HTN    Type 2 diabetes mellitus without complication, with long-term current use of insulin    Coronary artery disease involving native coronary artery of native heart without angina pectoris    Smoker    Gastroesophageal reflux disease, esophagitis presence not specified    Oxygen dependent    DM (diabetes mellitus)    CAD (coronary artery disease)    GERD (gastroesophageal reflux disease)    Insomnia    No significant past surgical history     Impression: 60 Y/O male from [facility] presented to ED with worsening dry Cough, Nasal congestion  and worsening SOB x 1 week. An active smoker. Has  COPD (on 2-3 liters O2 daily, BiPAP nightly--noncompliant at [facility] ), previously intubated in Aug 2021. SOB, Cough due to Acute on chronic hypoxic hypercapnic respiratory failure secondary to COPD exacerbation . Negative Covid 19 swab and Negative RSV.     Suggestion:  O2 saturation 94% room air. Has SOB on exertion . Continue Oxygen supplementation 2L-3L NC when off PAP.  Reinforced the importance of Daily compliance to Bipap. Continue Bipap 12/6 FIo2 40% at Night.    Reinforced the importance of smoking cessation. On Nicotine patch on x 12 Hours.   Continue PRN Albuterol 2 Puffs Q 6 Hours.  Continue Budesonide 2 Puffs Twice Daily.  Continue Solumedrol 40 mg IVP Q 8 Hours x 2 days then taper to Q 12 Hours  x 2 Days . Then start on Prednisone 40 mg oral Daily x 5 Days then 20 mg oral daily x 5 Days.   DVT/ GI prophylactic .         Agree with above assessment and plan as transcribed.

## 2021-11-15 NOTE — CONSULT NOTE ADULT - SUBJECTIVE AND OBJECTIVE BOX
Source of information: , Chart review, patient  Patient language: English  : n/a    CC: Patient is a 61y old  Male who presents with a chief complaint of Cough, Shortness of breath (15 Nov 2021 11:09)      HPI:  Patient is a 60 y/o obese Male from [facility], active smoker, with medical hx significant for COPD (on 2-3 liters O2 daily, BiPAP nightly--noncompliant), previously intubated in Aug 2021, CAD s/p 1 stent (Aug 2017), Type 2 DM, HTN, TIA (2017), Chronic pain 2/2 RTA 2010 on Percocet, Anxiety, HLD, Moderate Pulm HTN, ?CHF (was volume overloaded on previous admission to ICU), p/w dry cough, increased shortness of breath over the last 1 week. Pt says he is always oxygen dependent for ambulation for the past 1.5 years, however he has had worsening dry cough and nasal congestion over the last few days associated increased dyspnea. C/o palpitations secondary to being anxious due to worsening SOB and decreased appetite. No c/o chest pain, leg pain or swelling, sick contacts, abdominal pain, n/v, diarrhea. Pt completed a 5 day course of ?Ciprofloxacin 500 mg for UTI last week (endorses dysuria has resolved).    In the ED, pt's vitals were  Afebrile, , /75, RR 16 on 3 L NC saturating 97% which was subsequently downtitrated to maintain target O2 88-92% in light of COPD hx  Cxray no focal consolidation noted  RVP negative, trop x 1 negative, EKG RBBB (not new), Sinus tachycardia 105 bpm, Twave inversions V1, 2, 3 (13 Nov 2021 13:12)      Patient stated goal for pain control: to be able to take deep breaths, utilize incentive spirometer, get out of bed to chair and ambulate with tolerable pain control.     PAIN SCORE:       SCALE USED: (1-10 VNRS)    PAST MEDICAL & SURGICAL HISTORY:  COPD (chronic obstructive pulmonary disease)  Oxygen 2-3L at home    Stented coronary artery  2017    HLD (hyperlipidemia)    Pulmonary HTN    Type 2 diabetes mellitus without complication, with long-term current use of insulin    Coronary artery disease involving native coronary artery of native heart without angina pectoris    Smoker    Gastroesophageal reflux disease, esophagitis presence not specified    Oxygen dependent    DM (diabetes mellitus)    CAD (coronary artery disease)    GERD (gastroesophageal reflux disease)    Insomnia    No significant past surgical history        FAMILY HISTORY:  No family history of chronic obstructive pulmonary disease    No family history of hypertension    No family history of mental disorder    FH: myocardial infarction (Mother)          SOCIAL HISTORY:  [ ] Denies Smoking, Alcohol, or Drug Use    Allergies    No Known Allergies    Intolerances        MEDICATIONS:    MEDICATIONS  (STANDING):  artificial  tears Solution 1 Drop(s) Both EYES daily  aspirin enteric coated 81 milliGRAM(s) Oral daily  azithromycin   Tablet 500 milliGRAM(s) Oral daily  budesonide 160 MICROgram(s)/formoterol 4.5 MICROgram(s) Inhaler 2 Puff(s) Inhalation two times a day  cefTRIAXone   IVPB 1000 milliGRAM(s) IV Intermittent every 24 hours  dextrose 40% Gel 15 Gram(s) Oral once  dextrose 50% Injectable 25 Gram(s) IV Push once  dextrose 50% Injectable 12.5 Gram(s) IV Push once  dextrose 50% Injectable 25 Gram(s) IV Push once  enoxaparin Injectable 40 milliGRAM(s) SubCutaneous daily  furosemide    Tablet 20 milliGRAM(s) Oral daily  glucagon  Injectable 1 milliGRAM(s) IntraMuscular once  guaiFENesin  milliGRAM(s) Oral every 12 hours  insulin glargine Injectable (LANTUS) 20 Unit(s) SubCutaneous at bedtime  insulin lispro (ADMELOG) corrective regimen sliding scale   SubCutaneous three times a day before meals  insulin lispro Injectable (ADMELOG) 15 Unit(s) SubCutaneous three times a day before meals  insulin lispro Injectable (ADMELOG). 6 Unit(s) SubCutaneous once  lidocaine   4% Patch 1 Patch Transdermal daily  melatonin 3 milliGRAM(s) Oral at bedtime  methylPREDNISolone sodium succinate Injectable 40 milliGRAM(s) IV Push every 12 hours  multivitamin/minerals 1 Tablet(s) Oral daily  nicotine - 21 mG/24Hr(s) Patch 1 patch Transdermal daily  pantoprazole    Tablet 40 milliGRAM(s) Oral before breakfast  senna 2 Tablet(s) Oral at bedtime    MEDICATIONS  (PRN):  ALBUTerol    90 MICROgram(s) HFA Inhaler 2 Puff(s) Inhalation every 6 hours PRN Shortness of Breath and/or Wheezing  ALPRAZolam 0.5 milliGRAM(s) Oral two times a day PRN Anxiety  oxycodone    5 mG/acetaminophen 325 mG 2 Tablet(s) Oral every 4 hours PRN Severe Pain (7 - 10)  polyethylene glycol 3350 17 Gram(s) Oral daily PRN Constipation  simethicone 80 milliGRAM(s) Chew daily PRN Dyspepsia      Vital Signs Last 24 Hrs  T(C): 36.3 (15 Nov 2021 12:50), Max: 36.6 (14 Nov 2021 20:30)  T(F): 97.3 (15 Nov 2021 12:50), Max: 97.9 (15 Nov 2021 05:29)  HR: 76 (15 Nov 2021 12:50) (71 - 89)  BP: 109/64 (15 Nov 2021 12:50) (109/57 - 115/65)  BP(mean): --  RR: 18 (15 Nov 2021 12:50) (18 - 18)  SpO2: 95% (15 Nov 2021 12:50) (95% - 98%)    LABS:                          12.3   9.92  )-----------( 223      ( 15 Nov 2021 06:38 )             37.8     11-15    133<L>  |  94<L>  |  21<H>  ----------------------------<  352<H>  4.9   |  41<H>  |  0.64    Ca    8.9      15 Nov 2021 06:38  Phos  4.4     11-15  Mg     2.3     11-15            CAPILLARY BLOOD GLUCOSE      POCT Blood Glucose.: 351 mg/dL (15 Nov 2021 11:13)  POCT Blood Glucose.: 354 mg/dL (15 Nov 2021 07:29)  POCT Blood Glucose.: 413 mg/dL (14 Nov 2021 21:11)  POCT Blood Glucose.: 323 mg/dL (14 Nov 2021 16:07)      REVIEW OF SYSTEMS:  CONSTITUTIONAL: No fever or fatigue  RESPIRATORY: No cough, wheezing, chills or hemoptysis; No shortness of breath  CARDIOVASCULAR: No chest pain, palpitations, dizziness, or leg swelling  GASTROINTESTINAL: No abdominal or epigastric pain. No nausea, vomiting; No diarrhea or constipation.   GENITOURINARY: No dysuria, frequency, hematuria, retention or incontinence  MUSCULOSKELETAL: No joint pain or swelling; No muscle, back, or extremity pain, no upper or lower motor strength weakness, no saddle anesthesia, bowel/bladder incontinence, no falls   NEURO: No weakness, no numbness   PSYCHIATRIC: No depression, anxiety, mood swings, or difficulty sleeping    PHYSICAL EXAM:  GENERAL:  Alert & Oriented X3, NAD, Good concentration  CHEST/LUNG: Clear to auscultation bilaterally; No rales, rhonchi, wheezing, or rubs  HEART: Regular rate and rhythm; No murmurs, rubs, or gallops  ABDOMEN: Soft, Nontender, Nondistended; Bowel sounds present  : no incontinence, no flank pain  EXTREMITIES:  2+ Peripheral Pulses, No cyanosis, or edema  MUSCULOSKELETAL: Motor Strength 5/5 B/L upper and lower extremities; moves all extremities equally against gravity; ROM intact; negative SRL  NEUROLOGICAL: awake and alert and oriented   SKIN: No rashes or lesions    Radiology:    Drug Screen:  aspirin enteric coated 81 milliGRAM(s) Oral daily  enoxaparin Injectable 40 milliGRAM(s) SubCutaneous daily    azithromycin   Tablet 500 milliGRAM(s) Oral daily  cefTRIAXone   IVPB 1000 milliGRAM(s) IV Intermittent every 24 hours        ORT Score   Family Hx of substance abuse	Female	Male  Alcohol 	                                              1              3  Illegal drugs	                                      2              3  Rx drugs                                               4	      4    Personal Hx of substance abuse		  Alcohol 	                                               3	      3  Illegal drugs                                  	       4	      4  Rx drugs                                                5	      5  Age between 16- 45 years	               1             1  hx preadolescent sexual abuse	       3	      0  Psychological disease		  ADD, OCD, bipolar, schizophrenia	2	      2  Depression                                    	1	      1  Score totals 		  		  a score of 3 or lower indicates low risk for opioid abuse		  a score of 4-7 indicates moderate risk for opioid abuse		  a score of 8 or higher indicates high risk for opioid abuse	    Risk factors associated with adverse outcomes related to opioid treatment  [ ]  Concurrent benzodiazepine use  [ ]  History/ Active substance use or alcohol use disorder  [ ] Psychiatric co-morbidity  [ ] Sleep apnea  [ ] COPD  [ ] BMI> 35  [ ] Liver dysfunction  [ ] Renal dysfunction  [ ] CHF  [ ] Smoker  [ ]  Age > 60 years      [ ]  [facility]  Reviewed and Copied to Chart. See below.           Source of information: , Chart review, patient  Patient language: English  : n/a    CC: Patient is a 61y old  Male who presents with a chief complaint of Cough, Shortness of breath (15 Nov 2021 11:09)      HPI:  Patient is a 62 y/o obese Male from [facility], active smoker, with medical hx significant for COPD (on 2-3 liters O2 daily, BiPAP nightly--noncompliant), previously intubated in Aug 2021, CAD s/p 1 stent (Aug 2017), Type 2 DM, HTN, TIA (2017), Chronic pain 2/2 RTA 2010 on Percocet, Anxiety, HLD, Moderate Pulm HTN, ?CHF (was volume overloaded on previous admission to ICU), p/w dry cough, increased shortness of breath over the last 1 week. Pt says he is always oxygen dependent for ambulation for the past 1.5 years, however he has had worsening dry cough and nasal congestion over the last few days associated increased dyspnea. C/o palpitations secondary to being anxious due to worsening SOB and decreased appetite. No c/o chest pain, leg pain or swelling, sick contacts, abdominal pain, n/v, diarrhea. Pt completed a 5 day course of ?Ciprofloxacin 500 mg for UTI last week (endorses dysuria has resolved).    Pt with copd on oxygen and bipap at night.  Pt was intubated in august 2021.  +admission for pna. + history of chronic back and pt is on percocet 2 tabs po q 6 hours prn.  No nausea or vomiting.  Pt is oob and at edge of bed.      PAIN SCORE:   4/10    SCALE USED: (1-10 VNRS)    PAST MEDICAL & SURGICAL HISTORY:  COPD (chronic obstructive pulmonary disease)  Oxygen 2-3L at home    Stented coronary artery  2017    HLD (hyperlipidemia)    Pulmonary HTN    Type 2 diabetes mellitus without complication, with long-term current use of insulin    Coronary artery disease involving native coronary artery of native heart without angina pectoris    Smoker    Gastroesophageal reflux disease, esophagitis presence not specified    Oxygen dependent    DM (diabetes mellitus)    CAD (coronary artery disease)    GERD (gastroesophageal reflux disease)    Insomnia    No significant past surgical history        FAMILY HISTORY:  No family history of chronic obstructive pulmonary disease    No family history of hypertension    No family history of mental disorder    FH: myocardial infarction (Mother)          SOCIAL HISTORY:  [x ] Denies Smoking, Alcohol, or Drug Use    Allergies    No Known Allergies    Intolerances        MEDICATIONS:    MEDICATIONS  (STANDING):  artificial  tears Solution 1 Drop(s) Both EYES daily  aspirin enteric coated 81 milliGRAM(s) Oral daily  azithromycin   Tablet 500 milliGRAM(s) Oral daily  budesonide 160 MICROgram(s)/formoterol 4.5 MICROgram(s) Inhaler 2 Puff(s) Inhalation two times a day  cefTRIAXone   IVPB 1000 milliGRAM(s) IV Intermittent every 24 hours  dextrose 40% Gel 15 Gram(s) Oral once  dextrose 50% Injectable 25 Gram(s) IV Push once  dextrose 50% Injectable 12.5 Gram(s) IV Push once  dextrose 50% Injectable 25 Gram(s) IV Push once  enoxaparin Injectable 40 milliGRAM(s) SubCutaneous daily  furosemide    Tablet 20 milliGRAM(s) Oral daily  glucagon  Injectable 1 milliGRAM(s) IntraMuscular once  guaiFENesin  milliGRAM(s) Oral every 12 hours  insulin glargine Injectable (LANTUS) 20 Unit(s) SubCutaneous at bedtime  insulin lispro (ADMELOG) corrective regimen sliding scale   SubCutaneous three times a day before meals  insulin lispro Injectable (ADMELOG) 15 Unit(s) SubCutaneous three times a day before meals  insulin lispro Injectable (ADMELOG). 6 Unit(s) SubCutaneous once  lidocaine   4% Patch 1 Patch Transdermal daily  melatonin 3 milliGRAM(s) Oral at bedtime  methylPREDNISolone sodium succinate Injectable 40 milliGRAM(s) IV Push every 12 hours  multivitamin/minerals 1 Tablet(s) Oral daily  nicotine - 21 mG/24Hr(s) Patch 1 patch Transdermal daily  pantoprazole    Tablet 40 milliGRAM(s) Oral before breakfast  senna 2 Tablet(s) Oral at bedtime    MEDICATIONS  (PRN):  ALBUTerol    90 MICROgram(s) HFA Inhaler 2 Puff(s) Inhalation every 6 hours PRN Shortness of Breath and/or Wheezing  ALPRAZolam 0.5 milliGRAM(s) Oral two times a day PRN Anxiety  oxycodone    5 mG/acetaminophen 325 mG 2 Tablet(s) Oral every 4 hours PRN Severe Pain (7 - 10)  polyethylene glycol 3350 17 Gram(s) Oral daily PRN Constipation  simethicone 80 milliGRAM(s) Chew daily PRN Dyspepsia      Vital Signs Last 24 Hrs  T(C): 36.3 (15 Nov 2021 12:50), Max: 36.6 (14 Nov 2021 20:30)  T(F): 97.3 (15 Nov 2021 12:50), Max: 97.9 (15 Nov 2021 05:29)  HR: 76 (15 Nov 2021 12:50) (71 - 89)  BP: 109/64 (15 Nov 2021 12:50) (109/57 - 115/65)  BP(mean): --  RR: 18 (15 Nov 2021 12:50) (18 - 18)  SpO2: 95% (15 Nov 2021 12:50) (95% - 98%)    LABS:                          12.3   9.92  )-----------( 223      ( 15 Nov 2021 06:38 )             37.8     11-15    133<L>  |  94<L>  |  21<H>  ----------------------------<  352<H>  4.9   |  41<H>  |  0.64    Ca    8.9      15 Nov 2021 06:38  Phos  4.4     11-15  Mg     2.3     11-15            CAPILLARY BLOOD GLUCOSE      POCT Blood Glucose.: 351 mg/dL (15 Nov 2021 11:13)  POCT Blood Glucose.: 354 mg/dL (15 Nov 2021 07:29)  POCT Blood Glucose.: 413 mg/dL (14 Nov 2021 21:11)  POCT Blood Glucose.: 323 mg/dL (14 Nov 2021 16:07)      REVIEW OF SYSTEMS:  CONSTITUTIONAL: No fever or fatigue  RESPIRATORY: No cough, wheezing, chills or hemoptysis; No shortness of breath  CARDIOVASCULAR: No chest pain, palpitations, dizziness, or leg swelling  GASTROINTESTINAL: No abdominal or epigastric pain. No nausea, vomiting; No diarrhea or constipation.   GENITOURINARY: No dysuria, frequency, hematuria, retention or incontinence  MUSCULOSKELETAL: + lower back pain  NEURO: No weakness, no numbness   PSYCHIATRIC: No depression, anxiety, mood swings, or difficulty sleeping    PHYSICAL EXAM:  GENERAL:  Alert & Oriented X3, NAD, Good concentration  CHEST/LUNG: + rhonchi + decreased breath sounds   HEART: Regular rate and rhythm; No murmurs, rubs, or gallops  ABDOMEN: Soft, Nontender, Nondistended; Bowel sounds present  : no incontinence, no flank pain  EXTREMITIES:  2+ Peripheral Pulses, No cyanosis, or edema  MUSCULOSKELETAL: + lumbar spine tenderness   NEUROLOGICAL: awake and alert and oriented   SKIN: No rashes or lesions    Radiology:  < from: Xray Chest 1 View- PORTABLE-Urgent (11.13.21 @ 09:50) >    EXAM:  XR CHEST PORTABLE URGENT 1V                            PROCEDURE DATE:  11/13/2021          INTERPRETATION:  AP supine chest on November 13, 2021 at 9:20 AM. Patient has chest pain.    Heart magnified by technique.    Prominent emre were better seen on August 21 of this year.    Lungs remain clear.    IMPRESSION: As above.    < end of copied text >    Drug Screen:  aspirin enteric coated 81 milliGRAM(s) Oral daily  enoxaparin Injectable 40 milliGRAM(s) SubCutaneous daily    azithromycin   Tablet 500 milliGRAM(s) Oral daily  cefTRIAXone   IVPB 1000 milliGRAM(s) IV Intermittent every 24 hours        ORT Score   Family Hx of substance abuse	Female	Male  Alcohol 	                                              1              3  Illegal drugs	                                      2              3  Rx drugs                                               4	      4    Personal Hx of substance abuse		  Alcohol 	                                               3	      3  Illegal drugs                                  	       4	      4  Rx drugs                                                5	      5  Age between 16- 45 years	               1             1  hx preadolescent sexual abuse	       3	      0  Psychological disease		  ADD, OCD, bipolar, schizophrenia	2	      2  Depression                                    	1	      1  Score totals 		0  		  a score of 3 or lower indicates low risk for opioid abuse		  a score of 4-7 indicates moderate risk for opioid abuse		  a score of 8 or higher indicates high risk for opioid abuse	    Risk factors associated with adverse outcomes related to opioid treatment  [ ]  Concurrent benzodiazepine use  [ ]  History/ Active substance use or alcohol use disorder  [ ] Psychiatric co-morbidity  [ ] Sleep apnea  [ ] COPD  [ ] BMI> 35  [ ] Liver dysfunction  [ ] Renal dysfunction  [ ] CHF  [ ] Smoker  [ x]  Age > 60 years      [ x]  [place]  Reviewed and Copied to Chart. See below.

## 2021-11-15 NOTE — CONSULT NOTE ADULT - ASSESSMENT
Steven Vidal  ×  Update Personal Info  FAQ  Search Results Help  Help  ×An MME Calculator is now available by clicking the MME tab on the purple task bar.  An informational sheet detailing this enhancement may be found by clicking ‘MME Calculator Help’ on the MME Calculator page.   This informational tool is a resource and is not meant for direct clinical application.   Patient Search   Multi-Patient Search   MME Calculator   Reports   Drug List   Designation   My ESTHELA #  Data Detail Level: Printer-Friendly View Extended View  Confidential Drug Utilization Report  Search Terms: alpesh sumner, 1960Search Date: 11/15/2021 15:08:27 PM  The Drug Utilization Report below displays all of the controlled substance prescriptions, if any, that your patient has filled in the last twelve months. The information displayed on this report is compiled from pharmacy submissions to the Department, and accurately reflects the information as submitted by the pharmacies.    This report was requested by: China Vidal | Reference #: [id number]    Others' Prescriptions  Patient Name: Alpesh SumnerBirth Date: 1960  Address: [address] [place] [id number]: Male  Rx Written	Rx Dispensed	Drug	Quantity	Days Supply	Prescriber Name	Prescriber Esthela #	Payment Method  10/25/2021	10/25/2021	oxycodone-acetaminophen  mg tablet	120	30	Raman Ramos	[id number]	Medicaid  [facility]  10/25/2021	10/25/2021	alprazolam 0.5 mg tablet	60	30	Raman Ramos	[id number]	Medicaid  [facility]  07/21/2021	08/13/2021	oxycodone-acetaminophen  mg tablet	120	30	Raman Ramos	[id number]	Medicaid  [facility]  08/09/2021	08/09/2021	alprazolam 0.5 mg tablet	30	30	Raman Ramos	[id number]	Medicaid  [facility]  07/14/2021	07/14/2021	alprazolam 0.5 mg tablet	60	30	Denise Ashby	[id number]	Medicaid  [facility]  06/18/2021	06/18/2021	alprazolam 0.5 mg tablet	30	30	Raman Ramos	[id number]	Medicaid  [facility]  04/30/2021	05/22/2021	oxycodone-acetaminophen  mg tablet	120	30	Raman Ramos	[id number]	Medicaid  [facility]  05/21/2021	05/22/2021	alprazolam 0.5 mg tablet	30	30	YetRaman malik	[id number]	Medicaid  [facility]  04/15/2021	04/22/2021	oxycodone-acetaminophen  mg tablet	120	30	YetRaman malik	[id number]	Medicaid  [facility]  04/15/2021	04/15/2021	alprazolam 0.5 mg tablet	30	30	Raman Ramos	[id number]	Medicaid  [facility]  03/24/2021	03/30/2021	oxycodone-acetaminophen  mg tablet	120	30	Raman Ramos	[id number]	Medicaid  [facility]  03/18/2021	03/23/2021	alprazolam 0.5 mg tablet	30	30	YetRaman malik	[id number]	Medicaid  [facility]  03/08/2021	03/08/2021	oxycodone-acetaminophen 5-325 mg tablet	120	30	Raman Ramos	[id number]	Medicaid  [facility]  12/07/2020	02/09/2021	zolpidem tartrate 10 mg tablet	30	30	Raman Ramos	[id number]	[place]

## 2021-11-16 ENCOUNTER — TRANSCRIPTION ENCOUNTER (OUTPATIENT)
Age: 61
End: 2021-11-16

## 2021-11-16 VITALS
RESPIRATION RATE: 18 BRPM | OXYGEN SATURATION: 94 % | SYSTOLIC BLOOD PRESSURE: 155 MMHG | HEART RATE: 69 BPM | DIASTOLIC BLOOD PRESSURE: 89 MMHG

## 2021-11-16 LAB
ANION GAP SERPL CALC-SCNC: 4 MMOL/L — LOW (ref 5–17)
BUN SERPL-MCNC: 18 MG/DL — SIGNIFICANT CHANGE UP (ref 7–18)
CALCIUM SERPL-MCNC: 8.8 MG/DL — SIGNIFICANT CHANGE UP (ref 8.4–10.5)
CHLORIDE SERPL-SCNC: 92 MMOL/L — LOW (ref 96–108)
CO2 SERPL-SCNC: 40 MMOL/L — HIGH (ref 22–31)
CREAT SERPL-MCNC: 0.66 MG/DL — SIGNIFICANT CHANGE UP (ref 0.5–1.3)
GLUCOSE BLDC GLUCOMTR-MCNC: 337 MG/DL — HIGH (ref 70–99)
GLUCOSE BLDC GLUCOMTR-MCNC: 398 MG/DL — HIGH (ref 70–99)
GLUCOSE SERPL-MCNC: 356 MG/DL — HIGH (ref 70–99)
HCT VFR BLD CALC: 39.7 % — SIGNIFICANT CHANGE UP (ref 39–50)
HGB BLD-MCNC: 12.2 G/DL — LOW (ref 13–17)
MCHC RBC-ENTMCNC: 28 PG — SIGNIFICANT CHANGE UP (ref 27–34)
MCHC RBC-ENTMCNC: 30.7 GM/DL — LOW (ref 32–36)
MCV RBC AUTO: 91.3 FL — SIGNIFICANT CHANGE UP (ref 80–100)
NRBC # BLD: 0 /100 WBCS — SIGNIFICANT CHANGE UP (ref 0–0)
PLATELET # BLD AUTO: 208 K/UL — SIGNIFICANT CHANGE UP (ref 150–400)
POTASSIUM SERPL-MCNC: 4.6 MMOL/L — SIGNIFICANT CHANGE UP (ref 3.5–5.3)
POTASSIUM SERPL-SCNC: 4.6 MMOL/L — SIGNIFICANT CHANGE UP (ref 3.5–5.3)
RBC # BLD: 4.35 M/UL — SIGNIFICANT CHANGE UP (ref 4.2–5.8)
RBC # FLD: 13.2 % — SIGNIFICANT CHANGE UP (ref 10.3–14.5)
SODIUM SERPL-SCNC: 136 MMOL/L — SIGNIFICANT CHANGE UP (ref 135–145)
WBC # BLD: 9.25 K/UL — SIGNIFICANT CHANGE UP (ref 3.8–10.5)
WBC # FLD AUTO: 9.25 K/UL — SIGNIFICANT CHANGE UP (ref 3.8–10.5)

## 2021-11-16 PROCEDURE — 94660 CPAP INITIATION&MGMT: CPT

## 2021-11-16 PROCEDURE — 83036 HEMOGLOBIN GLYCOSYLATED A1C: CPT

## 2021-11-16 PROCEDURE — 87040 BLOOD CULTURE FOR BACTERIA: CPT

## 2021-11-16 PROCEDURE — 0225U NFCT DS DNA&RNA 21 SARSCOV2: CPT

## 2021-11-16 PROCEDURE — 83735 ASSAY OF MAGNESIUM: CPT

## 2021-11-16 PROCEDURE — 80048 BASIC METABOLIC PNL TOTAL CA: CPT

## 2021-11-16 PROCEDURE — 83880 ASSAY OF NATRIURETIC PEPTIDE: CPT

## 2021-11-16 PROCEDURE — 96374 THER/PROPH/DIAG INJ IV PUSH: CPT

## 2021-11-16 PROCEDURE — 84484 ASSAY OF TROPONIN QUANT: CPT

## 2021-11-16 PROCEDURE — 84145 PROCALCITONIN (PCT): CPT

## 2021-11-16 PROCEDURE — 71045 X-RAY EXAM CHEST 1 VIEW: CPT

## 2021-11-16 PROCEDURE — 85027 COMPLETE CBC AUTOMATED: CPT

## 2021-11-16 PROCEDURE — 36415 COLL VENOUS BLD VENIPUNCTURE: CPT

## 2021-11-16 PROCEDURE — 82962 GLUCOSE BLOOD TEST: CPT

## 2021-11-16 PROCEDURE — 94640 AIRWAY INHALATION TREATMENT: CPT

## 2021-11-16 PROCEDURE — 86769 SARS-COV-2 COVID-19 ANTIBODY: CPT

## 2021-11-16 PROCEDURE — 80053 COMPREHEN METABOLIC PANEL: CPT

## 2021-11-16 PROCEDURE — 82803 BLOOD GASES ANY COMBINATION: CPT

## 2021-11-16 PROCEDURE — 93005 ELECTROCARDIOGRAM TRACING: CPT

## 2021-11-16 PROCEDURE — 99232 SBSQ HOSP IP/OBS MODERATE 35: CPT

## 2021-11-16 PROCEDURE — 99285 EMERGENCY DEPT VISIT HI MDM: CPT

## 2021-11-16 PROCEDURE — 84100 ASSAY OF PHOSPHORUS: CPT

## 2021-11-16 PROCEDURE — 85025 COMPLETE CBC W/AUTO DIFF WBC: CPT

## 2021-11-16 RX ORDER — GABAPENTIN 400 MG/1
1 CAPSULE ORAL
Qty: 0 | Refills: 0 | DISCHARGE
Start: 2021-11-16

## 2021-11-16 RX ORDER — LIDOCAINE 4 G/100G
0 CREAM TOPICAL
Qty: 0 | Refills: 0 | DISCHARGE
Start: 2021-11-16

## 2021-11-16 RX ORDER — INSULIN GLARGINE 100 [IU]/ML
20 INJECTION, SOLUTION SUBCUTANEOUS
Qty: 0 | Refills: 0 | DISCHARGE
Start: 2021-11-16

## 2021-11-16 RX ORDER — INSULIN NPH HUM/REG INSULIN HM 70-30/ML
1 VIAL (ML) SUBCUTANEOUS
Qty: 0 | Refills: 0 | DISCHARGE

## 2021-11-16 RX ORDER — GABAPENTIN 400 MG/1
100 CAPSULE ORAL EVERY 12 HOURS
Refills: 0 | Status: DISCONTINUED | OUTPATIENT
Start: 2021-11-16 | End: 2021-11-16

## 2021-11-16 RX ADMIN — OXYCODONE AND ACETAMINOPHEN 2 TABLET(S): 5; 325 TABLET ORAL at 13:30

## 2021-11-16 RX ADMIN — OXYCODONE AND ACETAMINOPHEN 2 TABLET(S): 5; 325 TABLET ORAL at 16:17

## 2021-11-16 RX ADMIN — Medication 15 UNIT(S): at 12:00

## 2021-11-16 RX ADMIN — Medication 15 UNIT(S): at 07:46

## 2021-11-16 RX ADMIN — Medication 81 MILLIGRAM(S): at 11:59

## 2021-11-16 RX ADMIN — OXYCODONE AND ACETAMINOPHEN 2 TABLET(S): 5; 325 TABLET ORAL at 11:49

## 2021-11-16 RX ADMIN — Medication 10: at 12:00

## 2021-11-16 RX ADMIN — OXYCODONE AND ACETAMINOPHEN 2 TABLET(S): 5; 325 TABLET ORAL at 07:35

## 2021-11-16 RX ADMIN — OXYCODONE AND ACETAMINOPHEN 2 TABLET(S): 5; 325 TABLET ORAL at 08:35

## 2021-11-16 RX ADMIN — Medication 1 TABLET(S): at 12:00

## 2021-11-16 RX ADMIN — ALBUTEROL 2 PUFF(S): 90 AEROSOL, METERED ORAL at 07:37

## 2021-11-16 RX ADMIN — Medication 40 MILLIGRAM(S): at 05:49

## 2021-11-16 RX ADMIN — AZITHROMYCIN 500 MILLIGRAM(S): 500 TABLET, FILM COATED ORAL at 11:59

## 2021-11-16 RX ADMIN — ENOXAPARIN SODIUM 40 MILLIGRAM(S): 100 INJECTION SUBCUTANEOUS at 11:59

## 2021-11-16 RX ADMIN — BUDESONIDE AND FORMOTEROL FUMARATE DIHYDRATE 2 PUFF(S): 160; 4.5 AEROSOL RESPIRATORY (INHALATION) at 11:59

## 2021-11-16 RX ADMIN — Medication 600 MILLIGRAM(S): at 05:49

## 2021-11-16 RX ADMIN — Medication 8: at 07:46

## 2021-11-16 RX ADMIN — Medication 1 DROP(S): at 11:59

## 2021-11-16 RX ADMIN — PANTOPRAZOLE SODIUM 40 MILLIGRAM(S): 20 TABLET, DELAYED RELEASE ORAL at 05:49

## 2021-11-16 RX ADMIN — GABAPENTIN 100 MILLIGRAM(S): 400 CAPSULE ORAL at 12:36

## 2021-11-16 RX ADMIN — CEFTRIAXONE 100 MILLIGRAM(S): 500 INJECTION, POWDER, FOR SOLUTION INTRAMUSCULAR; INTRAVENOUS at 12:36

## 2021-11-16 RX ADMIN — Medication 20 MILLIGRAM(S): at 05:48

## 2021-11-16 NOTE — PROGRESS NOTE ADULT - REASON FOR ADMISSION
Cough, Shortness of breath

## 2021-11-16 NOTE — PROGRESS NOTE ADULT - SUBJECTIVE AND OBJECTIVE BOX
Source of information: , Chart review  Patient language: English  : n/a    CC:  chronic back pain    This is a 61yMale patient who presents to the hospital for Patient is a 61y old  Male who presents with a chief complaint of Cough, Shortness of breath (15 Nov 2021 18:10)    Pt sitting up in bed, nad.  + chronic back pain Pt is on chronic opioids.  Pt is oob at edge of bed.  s/p admission for copd exacerbation. Pt is for dc to rehab today.      PAIN SCORE:   5/10      SCALE USED: (1-10 NRS)      PAST MEDICAL & SURGICAL HISTORY:  COPD (chronic obstructive pulmonary disease)  Oxygen 2-3L at home    Stented coronary artery  2017    HLD (hyperlipidemia)    Pulmonary HTN    Type 2 diabetes mellitus without complication, with long-term current use of insulin    Coronary artery disease involving native coronary artery of native heart without angina pectoris    Smoker    Gastroesophageal reflux disease, esophagitis presence not specified    Oxygen dependent    DM (diabetes mellitus)    CAD (coronary artery disease)    GERD (gastroesophageal reflux disease)    Insomnia    No significant past surgical history        FAMILY HISTORY:  No family history of chronic obstructive pulmonary disease    No family history of hypertension    No family history of mental disorder    FH: myocardial infarction (Mother)          SOCIAL HISTORY:  [x ] Denies Smoking, Alcohol, or Drug Use    Allergies    No Known Allergies    Intolerances        MEDICATIONS:    MEDICATIONS  (STANDING):  artificial  tears Solution 1 Drop(s) Both EYES daily  aspirin enteric coated 81 milliGRAM(s) Oral daily  azithromycin   Tablet 500 milliGRAM(s) Oral daily  budesonide 160 MICROgram(s)/formoterol 4.5 MICROgram(s) Inhaler 2 Puff(s) Inhalation two times a day  cefTRIAXone   IVPB 1000 milliGRAM(s) IV Intermittent every 24 hours  dextrose 40% Gel 15 Gram(s) Oral once  dextrose 50% Injectable 25 Gram(s) IV Push once  dextrose 50% Injectable 12.5 Gram(s) IV Push once  dextrose 50% Injectable 25 Gram(s) IV Push once  enoxaparin Injectable 40 milliGRAM(s) SubCutaneous daily  furosemide    Tablet 20 milliGRAM(s) Oral daily  gabapentin 100 milliGRAM(s) Oral every 12 hours  glucagon  Injectable 1 milliGRAM(s) IntraMuscular once  guaiFENesin  milliGRAM(s) Oral every 12 hours  insulin glargine Injectable (LANTUS) 20 Unit(s) SubCutaneous at bedtime  insulin lispro (ADMELOG) corrective regimen sliding scale   SubCutaneous Before meals and at bedtime  insulin lispro Injectable (ADMELOG) 15 Unit(s) SubCutaneous three times a day before meals  insulin lispro Injectable (ADMELOG). 6 Unit(s) SubCutaneous once  lidocaine   4% Patch 1 Patch Transdermal daily  melatonin 3 milliGRAM(s) Oral at bedtime  methylPREDNISolone sodium succinate Injectable 40 milliGRAM(s) IV Push every 12 hours  multivitamin/minerals 1 Tablet(s) Oral daily  nicotine - 21 mG/24Hr(s) Patch 1 patch Transdermal daily  pantoprazole    Tablet 40 milliGRAM(s) Oral before breakfast  senna 2 Tablet(s) Oral at bedtime    MEDICATIONS  (PRN):  ALBUTerol    90 MICROgram(s) HFA Inhaler 2 Puff(s) Inhalation every 6 hours PRN Shortness of Breath and/or Wheezing  ALPRAZolam 0.5 milliGRAM(s) Oral two times a day PRN Anxiety  oxycodone    5 mG/acetaminophen 325 mG 2 Tablet(s) Oral every 4 hours PRN Severe Pain (7 - 10)  polyethylene glycol 3350 17 Gram(s) Oral daily PRN Constipation  simethicone 80 milliGRAM(s) Chew daily PRN Dyspepsia      Vital Signs Last 24 Hrs  T(C): 36.4 (16 Nov 2021 05:05), Max: 36.4 (16 Nov 2021 05:05)  T(F): 97.6 (16 Nov 2021 05:05), Max: 97.6 (16 Nov 2021 05:05)  HR: 71 (16 Nov 2021 05:05) (70 - 76)  BP: 125/73 (16 Nov 2021 05:05) (109/64 - 133/75)  BP(mean): --  RR: 18 (16 Nov 2021 05:05) (18 - 18)  SpO2: 97% (16 Nov 2021 05:05) (95% - 97%)    LABS:                          12.2   9.25  )-----------( 208      ( 16 Nov 2021 06:53 )             39.7     11-16    136  |  92<L>  |  18  ----------------------------<  356<H>  4.6   |  40<H>  |  0.66    Ca    8.8      16 Nov 2021 06:53  Phos  4.4     11-15  Mg     2.3     11-15            CAPILLARY BLOOD GLUCOSE      POCT Blood Glucose.: 337 mg/dL (16 Nov 2021 07:40)  POCT Blood Glucose.: 435 mg/dL (15 Nov 2021 22:42)  POCT Blood Glucose.: 379 mg/dL (15 Nov 2021 20:55)  POCT Blood Glucose.: 267 mg/dL (15 Nov 2021 16:37)      Radiology:    Drug Screen:        REVIEW OF SYSTEMS:  CONSTITUTIONAL: No fever or fatigue  RESPIRATORY: + wheezing + cough  CARDIOVASCULAR: No chest pain, palpitations, dizziness, or leg swelling  GASTROINTESTINAL: No abdominal or epigastric pain. No nausea, vomiting; No diarrhea or constipation.   GENITOURINARY: No dysuria, frequency, hematuria, retention or incontinence  MUSCULOSKELETAL: + lower back pain  NEURO: No headaches, No numbness/tingling b/l LE, No weakness  PSYCHIATRIC: No depression, anxiety, mood swings, or difficulty sleeping    PHYSICAL EXAM:  GENERAL:  Alert & Oriented X3, NAD, Good concentration  CHEST/LUNG: decreased breath sounds + occ rhonchi  HEART: Regular rate and rhythm; No murmurs, rubs, or gallops  ABDOMEN: Soft, Nontender, Nondistended; Bowel sounds present  EXTREMITIES:  2+ Peripheral Pulses, No cyanosis, or edema  MUSCULOSKELETAL: + decreased rom + lumbar spine tenderness   SKIN: No rashes or lesions    Risk factors associated with adverse outcomes related to opioid treatment  [ ]  Concurrent benzodiazepine use  [ ]  History/ Active substance use or alcohol use disorder  [ ] Psychiatric co-morbidity  [ ] Sleep apnea  x[ ] COPD  [ ] BMI> 35  [ ] Liver dysfunction  [ ] Renal dysfunction  [ ] CHF  [ ] Smoker  [ ]  Age > 60 years    [ x]  [place]  Reviewed and Copied to Chart. See below.    Plan of care and goal oriented pain management treatment options were discussed with patient and /or primary care giver; all questions and concerns were addressed and care was aligned with patient's wishes.    Educated patient on goal oriented pain management treatment options     11-16-21 @ 11:51

## 2021-11-16 NOTE — PROGRESS NOTE ADULT - PROBLEM SELECTOR PLAN 1
-  Patient presented with  increasing shortness of breath x 1 week, cough,  -  uses home oxygen 2-3 L at NH and has been previously intubated in Aug 2021,  -  Bipap nightly (noncompliant as per chart review)  -  Patient recently finished a course of abx for UTI, could likely trigger underlying COPD exacerbation. S/p duonebs and 1 dose of 125 mg Solumedrol in ED  -  Will titrate oxygen to be maintained around 88-92% in light of COPD hx  -  Continue with  Mucinex Q12 hrs for cough  -  Started on Solumedrol 40 mg decreased to q12 hours 11/15  -  start on PO steroids 11/16  -  Albuterol 2 puffs PRN Q6 hrs and Symbicort 2 puffs BID, Azithromycin PO x 3 days and IV Rocephin   -  F/u procalcitonin  -  Dr. Marquez Pultarah consulted
Chronic back pain.   ·  Recommendation: Pt with chronic back pain and is on percocet 2 tabs po q 6 hours as outpt.  Pt is also on chronic benzo.  Currently admitted for copd exacerbation.  nonopioid recc  - solumedrol as ordered - taper and transition to po   - no nsaids    - lidocaine patch daily  - gabapentin 100mg po q 12 hours   opioid recc  - percocet 2 tabs po q 4 hours prn ( as ordered)  bowel regimen  -senna and miralax  Continue home dose of xanax  OOB/PT.

## 2021-11-16 NOTE — DISCHARGE NOTE NURSING/CASE MANAGEMENT/SOCIAL WORK - PATIENT PORTAL LINK FT
You can access the FollowMyHealth Patient Portal offered by [facility] by registering at the following website: [email or web address]. By joining [facility]’s FollowMyHealth portal, you will also be able to view your health information using other applications (apps) compatible with our system.

## 2021-11-16 NOTE — PROGRESS NOTE ADULT - ASSESSMENT
Patient is a 60 y/o obese Male from [facility], active smoker, with medical hx significant for COPD (on 2-3 liters O2 daily, BiPAP nightly), CAD s/p 1 stent (Aug 2017), Type 2 DM, HTN, TIA (2017), Chronic pain 2/2 RTA 2010 on Percocet, Anxiety, HLD, Moderate Pulm HTN, p/w dry cough, increased shortness of breath over the last 1 week, admitted for COPD Exacerbation
Steven Vidal  ×  Update Personal Info  FAQ  Search Results Help  Help  ×An MME Calculator is now available by clicking the MME tab on the purple task bar.  An informational sheet detailing this enhancement may be found by clicking ‘MME Calculator Help’ on the MME Calculator page.   This informational tool is a resource and is not meant for direct clinical application.   Patient Search   Multi-Patient Search   MME Calculator   Reports   Drug List   Designation   My ESTHELA #  Data Detail Level: Printer-Friendly View Extended View  Confidential Drug Utilization Report  Search Terms: alpesh sumner, 1960Search Date: 11/15/2021 15:08:27 PM  The Drug Utilization Report below displays all of the controlled substance prescriptions, if any, that your patient has filled in the last twelve months. The information displayed on this report is compiled from pharmacy submissions to the Department, and accurately reflects the information as submitted by the pharmacies.    This report was requested by: China Vidal | Reference #: [id number]    Others' Prescriptions  Patient Name: Alpesh SumnerBirth Date: 1960  Address: [address] [place] [id number]: Male  Rx Written	Rx Dispensed	Drug	Quantity	Days Supply	Prescriber Name	Prescriber Esthela #	Payment Method  10/25/2021	10/25/2021	oxycodone-acetaminophen  mg tablet	120	30	Raman Ramos	[id number]	Medicaid  [facility]  10/25/2021	10/25/2021	alprazolam 0.5 mg tablet	60	30	Raman Ramos	[id number]	Medicaid  [facility]  07/21/2021	08/13/2021	oxycodone-acetaminophen  mg tablet	120	30	Raman Ramos	[id number]	Medicaid  [facility]  08/09/2021	08/09/2021	alprazolam 0.5 mg tablet	30	30	Raman Ramos	[id number]	Medicaid  [facility]  07/14/2021	07/14/2021	alprazolam 0.5 mg tablet	60	30	Denise Ashby	[id number]	Medicaid  [facility]  06/18/2021	06/18/2021	alprazolam 0.5 mg tablet	30	30	Raman Ramos	[id number]	Medicaid  [facility]  04/30/2021	05/22/2021	oxycodone-acetaminophen  mg tablet	120	30	Raman Ramos	[id number]	Medicaid  [facility]  05/21/2021	05/22/2021	alprazolam 0.5 mg tablet	30	30	YetRaman malik	[id number]	Medicaid  [facility]  04/15/2021	04/22/2021	oxycodone-acetaminophen  mg tablet	120	30	YetRaman malik	[id number]	Medicaid  [facility]  04/15/2021	04/15/2021	alprazolam 0.5 mg tablet	30	30	Raman Ramos	[id number]	Medicaid  [facility]  03/24/2021	03/30/2021	oxycodone-acetaminophen  mg tablet	120	30	Raman Ramos	[id number]	Medicaid  [facility]  03/18/2021	03/23/2021	alprazolam 0.5 mg tablet	30	30	YetRaman malik	[id number]	Medicaid  [facility]  03/08/2021	03/08/2021	oxycodone-acetaminophen 5-325 mg tablet	120	30	Raman Ramos	[id number]	Medicaid  [facility]  12/07/2020	02/09/2021	zolpidem tartrate 10 mg tablet	30	30	Raman Ramos	[id number]	[place]

## 2021-11-16 NOTE — PROGRESS NOTE ADULT - SUBJECTIVE AND OBJECTIVE BOX
Time of Visit:  Patient seen and examined.     MEDICATIONS  (STANDING):  artificial  tears Solution 1 Drop(s) Both EYES daily  aspirin enteric coated 81 milliGRAM(s) Oral daily  budesonide 160 MICROgram(s)/formoterol 4.5 MICROgram(s) Inhaler 2 Puff(s) Inhalation two times a day  cefTRIAXone   IVPB 1000 milliGRAM(s) IV Intermittent every 24 hours  dextrose 40% Gel 15 Gram(s) Oral once  dextrose 50% Injectable 25 Gram(s) IV Push once  dextrose 50% Injectable 12.5 Gram(s) IV Push once  dextrose 50% Injectable 25 Gram(s) IV Push once  enoxaparin Injectable 40 milliGRAM(s) SubCutaneous daily  furosemide    Tablet 20 milliGRAM(s) Oral daily  gabapentin 100 milliGRAM(s) Oral every 12 hours  glucagon  Injectable 1 milliGRAM(s) IntraMuscular once  guaiFENesin  milliGRAM(s) Oral every 12 hours  insulin glargine Injectable (LANTUS) 20 Unit(s) SubCutaneous at bedtime  insulin lispro (ADMELOG) corrective regimen sliding scale   SubCutaneous Before meals and at bedtime  insulin lispro Injectable (ADMELOG) 15 Unit(s) SubCutaneous three times a day before meals  insulin lispro Injectable (ADMELOG). 6 Unit(s) SubCutaneous once  lidocaine   4% Patch 1 Patch Transdermal daily  melatonin 3 milliGRAM(s) Oral at bedtime  methylPREDNISolone sodium succinate Injectable 40 milliGRAM(s) IV Push every 12 hours  multivitamin/minerals 1 Tablet(s) Oral daily  nicotine - 21 mG/24Hr(s) Patch 1 patch Transdermal daily  pantoprazole    Tablet 40 milliGRAM(s) Oral before breakfast  senna 2 Tablet(s) Oral at bedtime      MEDICATIONS  (PRN):  ALBUTerol    90 MICROgram(s) HFA Inhaler 2 Puff(s) Inhalation every 6 hours PRN Shortness of Breath and/or Wheezing  ALPRAZolam 0.5 milliGRAM(s) Oral two times a day PRN Anxiety  oxycodone    5 mG/acetaminophen 325 mG 2 Tablet(s) Oral every 4 hours PRN Severe Pain (7 - 10)  polyethylene glycol 3350 17 Gram(s) Oral daily PRN Constipation  simethicone 80 milliGRAM(s) Chew daily PRN Dyspepsia       Medications up to date at time of exam.      PHYSICAL EXAMINATION:  Patient has no new complaints.  GENERAL: The patient is a well-developed, well-nourished, in no apparent distress.     Vital Signs Last 24 Hrs  T(C): 36.4 (16 Nov 2021 05:05), Max: 36.4 (16 Nov 2021 05:05)  T(F): 97.6 (16 Nov 2021 05:05), Max: 97.6 (16 Nov 2021 05:05)  HR: 69 (16 Nov 2021 16:24) (69 - 73)  BP: 155/89 (16 Nov 2021 16:24) (125/73 - 155/89)  BP(mean): --  RR: 18 (16 Nov 2021 16:24) (18 - 18)  SpO2: 94% (16 Nov 2021 16:24) (94% - 97%)   (if applicable)    Chest Tube (if applicable)    HEENT: Head is normocephalic and atraumatic. Extraocular muscles are intact. Mucous membranes are moist.     NECK: Supple, no palpable adenopathy.    LUNGS: Clear to auscultation, no wheezing, rales, or rhonchi.    HEART: Regular rate and rhythm without murmur.    ABDOMEN: Soft, nontender, and nondistended.  No hepatosplenomegaly is noted.    : No painful voiding, no pelvic pain    EXTREMITIES: Without any cyanosis, clubbing, rash, lesions or edema.    NEUROLOGIC: Awake, alert, oriented, grossly intact    SKIN: Warm, dry, good turgor.      LABS:                        12.2   9.25  )-----------( 208      ( 16 Nov 2021 06:53 )             39.7     11-16    136  |  92<L>  |  18  ----------------------------<  356<H>  4.6   |  40<H>  |  0.66    Ca    8.8      16 Nov 2021 06:53  Phos  4.4     11-15  Mg     2.3     11-15                      Procalcitonin, Serum: 0.07 ng/mL (11-13-21 @ 21:52)      MICROBIOLOGY: (if applicable)    RADIOLOGY & ADDITIONAL STUDIES:  EKG:   CXR:  ECHO:    IMPRESSION: 61y Male PAST MEDICAL & SURGICAL HISTORY:  COPD (chronic obstructive pulmonary disease)  Oxygen 2-3L at home    Stented coronary artery  2017    HLD (hyperlipidemia)    Pulmonary HTN    Type 2 diabetes mellitus without complication, with long-term current use of insulin    Coronary artery disease involving native coronary artery of native heart without angina pectoris    Smoker    Gastroesophageal reflux disease, esophagitis presence not specified    Oxygen dependent    DM (diabetes mellitus)    CAD (coronary artery disease)    GERD (gastroesophageal reflux disease)    Insomnia    No significant past surgical history     p/w             Suggestion:  IMP: This is a 61 yr old  obese Man  from [facility], active smoker, with medical hx significant for COPD (on 2-3 liters O2 daily, BiPAP nightly--noncompliant), previously intubated in Aug 2021, CAD s/p 1 stent (Aug 2017), Type 2 DM, HTN, TIA (2017), Chronic pain 2/2 RTA 2010 on Percocet, Anxiety, HLD, Moderate Pulm HTN, ?CHF (was volume overloaded on previous admission to ICU), p/w dry cough, increased shortness of breath over the last 1 week. Pat admitted for acute SOB due to active smoking     Sugg;  -continue O2 supp  -advise compliance with meds and CPAP devise  -solumedrol   -albuterol inhaler   -advise to stop smoking   -nicotine patch   -monitor blood sugar with coverage   -out pat pulmo f/u  -dvt/gi prophy

## 2021-11-17 RX ORDER — CEFUROXIME AXETIL 250 MG
1 TABLET ORAL
Qty: 4 | Refills: 0
Start: 2021-11-17 | End: 2021-11-18

## 2021-12-04 NOTE — ED ADULT NURSE REASSESSMENT NOTE - NS ED NURSE REASSESS COMMENT FT1
Pt. is stable at this time, no complaints voiced. No signs of distress noted. Pt. on nasal canula breathing and oxygen saturation WDL. Tejada catheter inserted and draining. Endorsed to ED hold for continued care. Labs performed.  Written and verbal instructions provided, verbalized understanding.  Now scheduled for robotic assisted total laparoscopic, hysterectomy, bilateral salpingectomy, right oophorectomy, frozen section, possible left oophorectomy, cystoscopy with Dr. Moses on 12/7/21.   Patient educated on surgical scrub, COVID testing 12/4, and preadmission instructions, verbalizes understanding, teach back method utilized.  Patient instructed to stop ASA/Herbals or anti-inflammatory meds one week prior to surgery and discuss with PCP
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
## 2021-12-16 NOTE — DIETITIAN INITIAL EVALUATION ADULT. - POUNDS LOST/GAINED
What Type Of Note Output Would You Prefer (Optional)?: Standard Output How Severe Is Your Skin Lesion?: moderate Has Your Skin Lesion Been Treated?: not been treated Is This A New Presentation, Or A Follow-Up?: Skin Lesion 34

## 2022-03-09 ENCOUNTER — INPATIENT (INPATIENT)
Facility: HOSPITAL | Age: 62
LOS: 5 days | Discharge: NURSING, REHAB OR LONG-TERM CARE | DRG: 208 | End: 2022-03-15
Attending: SURGERY | Admitting: SURGERY
Payer: MEDICAID

## 2022-03-09 VITALS
HEART RATE: 118 BPM | OXYGEN SATURATION: 99 % | DIASTOLIC BLOOD PRESSURE: 69 MMHG | HEIGHT: 68 IN | WEIGHT: 207.01 LBS | TEMPERATURE: 99 F | SYSTOLIC BLOOD PRESSURE: 129 MMHG | RESPIRATION RATE: 17 BRPM

## 2022-03-09 DIAGNOSIS — J44.9 CHRONIC OBSTRUCTIVE PULMONARY DISEASE, UNSPECIFIED: ICD-10-CM

## 2022-03-09 DIAGNOSIS — E87.5 HYPERKALEMIA: ICD-10-CM

## 2022-03-09 DIAGNOSIS — E78.5 HYPERLIPIDEMIA, UNSPECIFIED: ICD-10-CM

## 2022-03-09 DIAGNOSIS — G62.9 POLYNEUROPATHY, UNSPECIFIED: ICD-10-CM

## 2022-03-09 DIAGNOSIS — Z29.9 ENCOUNTER FOR PROPHYLACTIC MEASURES, UNSPECIFIED: ICD-10-CM

## 2022-03-09 DIAGNOSIS — I50.9 HEART FAILURE, UNSPECIFIED: ICD-10-CM

## 2022-03-09 DIAGNOSIS — R06.02 SHORTNESS OF BREATH: ICD-10-CM

## 2022-03-09 DIAGNOSIS — E11.9 TYPE 2 DIABETES MELLITUS WITHOUT COMPLICATIONS: ICD-10-CM

## 2022-03-09 DIAGNOSIS — I25.10 ATHEROSCLEROTIC HEART DISEASE OF NATIVE CORONARY ARTERY WITHOUT ANGINA PECTORIS: ICD-10-CM

## 2022-03-09 DIAGNOSIS — J44.1 CHRONIC OBSTRUCTIVE PULMONARY DISEASE WITH (ACUTE) EXACERBATION: ICD-10-CM

## 2022-03-09 LAB
ALBUMIN SERPL ELPH-MCNC: 3.2 G/DL — LOW (ref 3.5–5)
ALBUMIN SERPL ELPH-MCNC: 3.3 G/DL — LOW (ref 3.5–5)
ALP SERPL-CCNC: 71 U/L — SIGNIFICANT CHANGE UP (ref 40–120)
ALP SERPL-CCNC: 76 U/L — SIGNIFICANT CHANGE UP (ref 40–120)
ALT FLD-CCNC: 28 U/L DA — SIGNIFICANT CHANGE UP (ref 10–60)
ALT FLD-CCNC: 29 U/L DA — SIGNIFICANT CHANGE UP (ref 10–60)
ANION GAP SERPL CALC-SCNC: <-1 MMOL/L — LOW (ref 5–17)
ANION GAP SERPL CALC-SCNC: <3 MMOL/L — LOW (ref 5–17)
APTT BLD: 32.2 SEC — SIGNIFICANT CHANGE UP (ref 27.5–35.5)
AST SERPL-CCNC: 17 U/L — SIGNIFICANT CHANGE UP (ref 10–40)
AST SERPL-CCNC: 17 U/L — SIGNIFICANT CHANGE UP (ref 10–40)
BASE EXCESS BLDA CALC-SCNC: 22.7 MMOL/L — HIGH (ref -2–3)
BASE EXCESS BLDA CALC-SCNC: SIGNIFICANT CHANGE UP MMOL/L (ref -2–3)
BASE EXCESS BLDA CALC-SCNC: SIGNIFICANT CHANGE UP MMOL/L (ref -2–3)
BASOPHILS # BLD AUTO: 0.02 K/UL — SIGNIFICANT CHANGE UP (ref 0–0.2)
BASOPHILS # BLD AUTO: 0.03 K/UL — SIGNIFICANT CHANGE UP (ref 0–0.2)
BASOPHILS NFR BLD AUTO: 0.2 % — SIGNIFICANT CHANGE UP (ref 0–2)
BASOPHILS NFR BLD AUTO: 0.3 % — SIGNIFICANT CHANGE UP (ref 0–2)
BILIRUB SERPL-MCNC: 0.3 MG/DL — SIGNIFICANT CHANGE UP (ref 0.2–1.2)
BILIRUB SERPL-MCNC: 0.4 MG/DL — SIGNIFICANT CHANGE UP (ref 0.2–1.2)
BLOOD GAS COMMENTS ARTERIAL: SIGNIFICANT CHANGE UP
BUN SERPL-MCNC: 20 MG/DL — HIGH (ref 7–18)
BUN SERPL-MCNC: 21 MG/DL — HIGH (ref 7–18)
CALCIUM SERPL-MCNC: 8.5 MG/DL — SIGNIFICANT CHANGE UP (ref 8.4–10.5)
CALCIUM SERPL-MCNC: 8.8 MG/DL — SIGNIFICANT CHANGE UP (ref 8.4–10.5)
CHLORIDE SERPL-SCNC: 86 MMOL/L — LOW (ref 96–108)
CHLORIDE SERPL-SCNC: 89 MMOL/L — LOW (ref 96–108)
CK MB BLD-MCNC: 2.8 % — SIGNIFICANT CHANGE UP (ref 0–3.5)
CK MB CFR SERPL CALC: 3.9 NG/ML — HIGH (ref 0–3.6)
CK SERPL-CCNC: 140 U/L — SIGNIFICANT CHANGE UP (ref 35–232)
CO2 SERPL-SCNC: >45 MMOL/L — CRITICAL HIGH (ref 22–31)
CO2 SERPL-SCNC: >45 MMOL/L — CRITICAL HIGH (ref 22–31)
CREAT SERPL-MCNC: 0.53 MG/DL — SIGNIFICANT CHANGE UP (ref 0.5–1.3)
CREAT SERPL-MCNC: 0.58 MG/DL — SIGNIFICANT CHANGE UP (ref 0.5–1.3)
D DIMER BLD IA.RAPID-MCNC: <150 NG/ML DDU — SIGNIFICANT CHANGE UP
EGFR: 112 ML/MIN/1.73M2 — SIGNIFICANT CHANGE UP
EGFR: 115 ML/MIN/1.73M2 — SIGNIFICANT CHANGE UP
EOSINOPHIL # BLD AUTO: 0.01 K/UL — SIGNIFICANT CHANGE UP (ref 0–0.5)
EOSINOPHIL # BLD AUTO: 0.03 K/UL — SIGNIFICANT CHANGE UP (ref 0–0.5)
EOSINOPHIL NFR BLD AUTO: 0.1 % — SIGNIFICANT CHANGE UP (ref 0–6)
EOSINOPHIL NFR BLD AUTO: 0.3 % — SIGNIFICANT CHANGE UP (ref 0–6)
GAS PNL BLDA: SIGNIFICANT CHANGE UP
GLUCOSE BLDC GLUCOMTR-MCNC: 156 MG/DL — HIGH (ref 70–99)
GLUCOSE BLDC GLUCOMTR-MCNC: 67 MG/DL — LOW (ref 70–99)
GLUCOSE SERPL-MCNC: 142 MG/DL — HIGH (ref 70–99)
GLUCOSE SERPL-MCNC: 159 MG/DL — HIGH (ref 70–99)
HCO3 BLDA-SCNC: 56 MMOL/L — CRITICAL HIGH (ref 21–28)
HCO3 BLDA-SCNC: SIGNIFICANT CHANGE UP MMOL/L (ref 21–28)
HCO3 BLDA-SCNC: SIGNIFICANT CHANGE UP MMOL/L (ref 21–28)
HCT VFR BLD CALC: 43.7 % — SIGNIFICANT CHANGE UP (ref 39–50)
HCT VFR BLD CALC: 46.3 % — SIGNIFICANT CHANGE UP (ref 39–50)
HGB BLD-MCNC: 12.7 G/DL — LOW (ref 13–17)
HGB BLD-MCNC: 13.3 G/DL — SIGNIFICANT CHANGE UP (ref 13–17)
HOROWITZ INDEX BLDA+IHG-RTO: 36 — SIGNIFICANT CHANGE UP
HOROWITZ INDEX BLDA+IHG-RTO: 40 — SIGNIFICANT CHANGE UP
HOROWITZ INDEX BLDA+IHG-RTO: SIGNIFICANT CHANGE UP
IMM GRANULOCYTES NFR BLD AUTO: 1.1 % — SIGNIFICANT CHANGE UP (ref 0–1.5)
IMM GRANULOCYTES NFR BLD AUTO: 1.7 % — HIGH (ref 0–1.5)
INR BLD: 1.04 RATIO — SIGNIFICANT CHANGE UP (ref 0.88–1.16)
LACTATE SERPL-SCNC: 0.5 MMOL/L — LOW (ref 0.7–2)
LYMPHOCYTES # BLD AUTO: 0.54 K/UL — LOW (ref 1–3.3)
LYMPHOCYTES # BLD AUTO: 0.67 K/UL — LOW (ref 1–3.3)
LYMPHOCYTES # BLD AUTO: 4.4 % — LOW (ref 13–44)
LYMPHOCYTES # BLD AUTO: 6.3 % — LOW (ref 13–44)
MAGNESIUM SERPL-MCNC: 2 MG/DL — SIGNIFICANT CHANGE UP (ref 1.6–2.6)
MCHC RBC-ENTMCNC: 28.1 PG — SIGNIFICANT CHANGE UP (ref 27–34)
MCHC RBC-ENTMCNC: 28.2 PG — SIGNIFICANT CHANGE UP (ref 27–34)
MCHC RBC-ENTMCNC: 28.7 GM/DL — LOW (ref 32–36)
MCHC RBC-ENTMCNC: 29.1 GM/DL — LOW (ref 32–36)
MCV RBC AUTO: 96.9 FL — SIGNIFICANT CHANGE UP (ref 80–100)
MCV RBC AUTO: 97.7 FL — SIGNIFICANT CHANGE UP (ref 80–100)
MONOCYTES # BLD AUTO: 0.32 K/UL — SIGNIFICANT CHANGE UP (ref 0–0.9)
MONOCYTES # BLD AUTO: 0.53 K/UL — SIGNIFICANT CHANGE UP (ref 0–0.9)
MONOCYTES NFR BLD AUTO: 2.6 % — SIGNIFICANT CHANGE UP (ref 2–14)
MONOCYTES NFR BLD AUTO: 5 % — SIGNIFICANT CHANGE UP (ref 2–14)
NEUTROPHILS # BLD AUTO: 11.32 K/UL — HIGH (ref 1.8–7.4)
NEUTROPHILS # BLD AUTO: 9.25 K/UL — HIGH (ref 1.8–7.4)
NEUTROPHILS NFR BLD AUTO: 86.4 % — HIGH (ref 43–77)
NEUTROPHILS NFR BLD AUTO: 91.6 % — HIGH (ref 43–77)
NRBC # BLD: 0 /100 WBCS — SIGNIFICANT CHANGE UP (ref 0–0)
NRBC # BLD: 0 /100 WBCS — SIGNIFICANT CHANGE UP (ref 0–0)
NT-PROBNP SERPL-SCNC: 900 PG/ML — HIGH (ref 0–125)
PCO2 BLDA: 116 MMHG — CRITICAL HIGH (ref 35–48)
PCO2 BLDA: 125 MMHG — SIGNIFICANT CHANGE UP (ref 35–48)
PCO2 BLDA: >125 MMHG — CRITICAL HIGH (ref 35–48)
PH BLDA: 7.18 — CRITICAL LOW (ref 7.35–7.45)
PH BLDA: 7.29 — LOW (ref 7.35–7.45)
PH BLDA: 7.3 — LOW (ref 7.35–7.45)
PHOSPHATE SERPL-MCNC: 3.4 MG/DL — SIGNIFICANT CHANGE UP (ref 2.5–4.5)
PLATELET # BLD AUTO: 150 K/UL — SIGNIFICANT CHANGE UP (ref 150–400)
PLATELET # BLD AUTO: 156 K/UL — SIGNIFICANT CHANGE UP (ref 150–400)
PO2 BLDA: 101 MMHG — SIGNIFICANT CHANGE UP (ref 83–108)
PO2 BLDA: 57 MMHG — LOW (ref 83–108)
PO2 BLDA: 63 MMHG — LOW (ref 83–108)
POTASSIUM SERPL-MCNC: 5 MMOL/L — SIGNIFICANT CHANGE UP (ref 3.5–5.3)
POTASSIUM SERPL-MCNC: 5.6 MMOL/L — HIGH (ref 3.5–5.3)
POTASSIUM SERPL-SCNC: 5 MMOL/L — SIGNIFICANT CHANGE UP (ref 3.5–5.3)
POTASSIUM SERPL-SCNC: 5.6 MMOL/L — HIGH (ref 3.5–5.3)
PROT SERPL-MCNC: 6.9 G/DL — SIGNIFICANT CHANGE UP (ref 6–8.3)
PROT SERPL-MCNC: 7.3 G/DL — SIGNIFICANT CHANGE UP (ref 6–8.3)
PROTHROM AB SERPL-ACNC: 12.4 SEC — SIGNIFICANT CHANGE UP (ref 10.5–13.4)
RBC # BLD: 4.51 M/UL — SIGNIFICANT CHANGE UP (ref 4.2–5.8)
RBC # BLD: 4.74 M/UL — SIGNIFICANT CHANGE UP (ref 4.2–5.8)
RBC # FLD: 12.8 % — SIGNIFICANT CHANGE UP (ref 10.3–14.5)
RBC # FLD: 13 % — SIGNIFICANT CHANGE UP (ref 10.3–14.5)
SAO2 % BLDA: 90 % — SIGNIFICANT CHANGE UP
SAO2 % BLDA: 92 % — SIGNIFICANT CHANGE UP
SAO2 % BLDA: 99 % — SIGNIFICANT CHANGE UP
SARS-COV-2 RNA SPEC QL NAA+PROBE: SIGNIFICANT CHANGE UP
SODIUM SERPL-SCNC: 133 MMOL/L — LOW (ref 135–145)
SODIUM SERPL-SCNC: 134 MMOL/L — LOW (ref 135–145)
TROPONIN I, HIGH SENSITIVITY RESULT: 29.7 NG/L — SIGNIFICANT CHANGE UP
TROPONIN I, HIGH SENSITIVITY RESULT: 30.9 NG/L — SIGNIFICANT CHANGE UP
WBC # BLD: 10.69 K/UL — HIGH (ref 3.8–10.5)
WBC # BLD: 12.35 K/UL — HIGH (ref 3.8–10.5)
WBC # FLD AUTO: 10.69 K/UL — HIGH (ref 3.8–10.5)
WBC # FLD AUTO: 12.35 K/UL — HIGH (ref 3.8–10.5)

## 2022-03-09 PROCEDURE — 31500 INSERT EMERGENCY AIRWAY: CPT

## 2022-03-09 PROCEDURE — 71045 X-RAY EXAM CHEST 1 VIEW: CPT | Mod: 26

## 2022-03-09 PROCEDURE — 99291 CRITICAL CARE FIRST HOUR: CPT | Mod: 25

## 2022-03-09 PROCEDURE — 99285 EMERGENCY DEPT VISIT HI MDM: CPT

## 2022-03-09 RX ORDER — INSULIN HUMAN 100 [IU]/ML
5 INJECTION, SOLUTION SUBCUTANEOUS ONCE
Refills: 0 | Status: DISCONTINUED | OUTPATIENT
Start: 2022-03-09 | End: 2022-03-09

## 2022-03-09 RX ORDER — HYDROCORTISONE 20 MG
40 TABLET ORAL EVERY 12 HOURS
Refills: 0 | Status: DISCONTINUED | OUTPATIENT
Start: 2022-03-09 | End: 2022-03-09

## 2022-03-09 RX ORDER — PANTOPRAZOLE SODIUM 20 MG/1
40 TABLET, DELAYED RELEASE ORAL DAILY
Refills: 0 | Status: DISCONTINUED | OUTPATIENT
Start: 2022-03-09 | End: 2022-03-11

## 2022-03-09 RX ORDER — ALBUTEROL 90 UG/1
2 AEROSOL, METERED ORAL EVERY 6 HOURS
Refills: 0 | Status: DISCONTINUED | OUTPATIENT
Start: 2022-03-09 | End: 2022-03-09

## 2022-03-09 RX ORDER — FUROSEMIDE 40 MG
20 TABLET ORAL DAILY
Refills: 0 | Status: DISCONTINUED | OUTPATIENT
Start: 2022-03-09 | End: 2022-03-10

## 2022-03-09 RX ORDER — SODIUM POLYSTYRENE SULFONATE 4.1 MEQ/G
15 POWDER, FOR SUSPENSION ORAL ONCE
Refills: 0 | Status: DISCONTINUED | OUTPATIENT
Start: 2022-03-09 | End: 2022-03-09

## 2022-03-09 RX ORDER — IPRATROPIUM/ALBUTEROL SULFATE 18-103MCG
3 AEROSOL WITH ADAPTER (GRAM) INHALATION EVERY 6 HOURS
Refills: 0 | Status: DISCONTINUED | OUTPATIENT
Start: 2022-03-09 | End: 2022-03-10

## 2022-03-09 RX ORDER — AZITHROMYCIN 500 MG/1
TABLET, FILM COATED ORAL
Refills: 0 | Status: DISCONTINUED | OUTPATIENT
Start: 2022-03-09 | End: 2022-03-10

## 2022-03-09 RX ORDER — IPRATROPIUM/ALBUTEROL SULFATE 18-103MCG
1 AEROSOL WITH ADAPTER (GRAM) INHALATION
Qty: 0 | Refills: 0 | DISCHARGE

## 2022-03-09 RX ORDER — AZITHROMYCIN 500 MG/1
500 TABLET, FILM COATED ORAL EVERY 24 HOURS
Refills: 0 | Status: DISCONTINUED | OUTPATIENT
Start: 2022-03-10 | End: 2022-03-10

## 2022-03-09 RX ORDER — INSULIN LISPRO 100/ML
VIAL (ML) SUBCUTANEOUS EVERY 6 HOURS
Refills: 0 | Status: DISCONTINUED | OUTPATIENT
Start: 2022-03-09 | End: 2022-03-11

## 2022-03-09 RX ORDER — HALOPERIDOL DECANOATE 100 MG/ML
5 INJECTION INTRAMUSCULAR ONCE
Refills: 0 | Status: COMPLETED | OUTPATIENT
Start: 2022-03-09 | End: 2022-03-09

## 2022-03-09 RX ORDER — CHLORHEXIDINE GLUCONATE 213 G/1000ML
1 SOLUTION TOPICAL
Refills: 0 | Status: DISCONTINUED | OUTPATIENT
Start: 2022-03-09 | End: 2022-03-15

## 2022-03-09 RX ORDER — INSULIN LISPRO 100/ML
VIAL (ML) SUBCUTANEOUS EVERY 6 HOURS
Refills: 0 | Status: DISCONTINUED | OUTPATIENT
Start: 2022-03-09 | End: 2022-03-09

## 2022-03-09 RX ORDER — BUDESONIDE AND FORMOTEROL FUMARATE DIHYDRATE 160; 4.5 UG/1; UG/1
2 AEROSOL RESPIRATORY (INHALATION)
Refills: 0 | Status: DISCONTINUED | OUTPATIENT
Start: 2022-03-09 | End: 2022-03-09

## 2022-03-09 RX ORDER — PANTOPRAZOLE SODIUM 20 MG/1
1 TABLET, DELAYED RELEASE ORAL
Qty: 0 | Refills: 0 | DISCHARGE

## 2022-03-09 RX ORDER — AZITHROMYCIN 500 MG/1
500 TABLET, FILM COATED ORAL ONCE
Refills: 0 | Status: COMPLETED | OUTPATIENT
Start: 2022-03-09 | End: 2022-03-09

## 2022-03-09 RX ORDER — ENOXAPARIN SODIUM 100 MG/ML
40 INJECTION SUBCUTANEOUS EVERY 24 HOURS
Refills: 0 | Status: DISCONTINUED | OUTPATIENT
Start: 2022-03-09 | End: 2022-03-15

## 2022-03-09 RX ORDER — ASPIRIN/CALCIUM CARB/MAGNESIUM 324 MG
300 TABLET ORAL DAILY
Refills: 0 | Status: DISCONTINUED | OUTPATIENT
Start: 2022-03-09 | End: 2022-03-11

## 2022-03-09 RX ORDER — DEXTROSE 50 % IN WATER 50 %
50 SYRINGE (ML) INTRAVENOUS ONCE
Refills: 0 | Status: DISCONTINUED | OUTPATIENT
Start: 2022-03-09 | End: 2022-03-09

## 2022-03-09 RX ORDER — NICOTINE POLACRILEX 2 MG
1 GUM BUCCAL
Qty: 0 | Refills: 0 | DISCHARGE

## 2022-03-09 RX ORDER — CEFTRIAXONE 500 MG/1
1000 INJECTION, POWDER, FOR SOLUTION INTRAMUSCULAR; INTRAVENOUS EVERY 24 HOURS
Refills: 0 | Status: DISCONTINUED | OUTPATIENT
Start: 2022-03-09 | End: 2022-03-10

## 2022-03-09 RX ADMIN — Medication 40 MILLIGRAM(S): at 18:20

## 2022-03-09 RX ADMIN — ALBUTEROL 2 PUFF(S): 90 AEROSOL, METERED ORAL at 19:00

## 2022-03-09 RX ADMIN — Medication 3 MILLILITER(S): at 22:20

## 2022-03-09 RX ADMIN — AZITHROMYCIN 255 MILLIGRAM(S): 500 TABLET, FILM COATED ORAL at 19:05

## 2022-03-09 RX ADMIN — HALOPERIDOL DECANOATE 5 MILLIGRAM(S): 100 INJECTION INTRAMUSCULAR at 23:30

## 2022-03-09 RX ADMIN — Medication 125 MILLIGRAM(S): at 18:57

## 2022-03-09 RX ADMIN — Medication 20 MILLIGRAM(S): at 19:04

## 2022-03-09 RX ADMIN — Medication 40 MILLIGRAM(S): at 22:30

## 2022-03-09 NOTE — H&P ADULT - NSICDXPASTMEDICALHX_GEN_ALL_CORE_FT
PAST MEDICAL HISTORY:  CAD (coronary artery disease)     CHF (congestive heart failure)     COPD (chronic obstructive pulmonary disease) Oxygen 2-3L at home    Coronary artery disease involving native coronary artery of native heart without angina pectoris     DM (diabetes mellitus)     Gastroesophageal reflux disease, esophagitis presence not specified     GERD (gastroesophageal reflux disease)     HLD (hyperlipidemia)     HTN (hypertension)     Insomnia     Mood disorder     Oxygen dependent     Pulmonary HTN     Smoker     Stented coronary artery 2017    Type 2 diabetes mellitus without complication, with long-term current use of insulin

## 2022-03-09 NOTE — CONSULT NOTE ADULT - ATTENDING COMMENTS
Patient is a 60 y/o resident of a skilled nursing facility with h/o COPD on home oxygen, still smoking, also on Bipap seemingly mostly at night admitted with SOB after malfunction of bipap machine along with a chief complaint of SOB. The patient also has HTN, CHF, CAD s/p coronary stents, Bipolar disorder. He was tested for COVID 19 in the ED and was negative. The patient was admitted to the floor with a hypercapneic COPD exacerbation on non invasive positive pressure ventilation when a rapid response was called in the ED at around 6:45pm. I went to the ED to see the patient and found him awake, dyspneic and tachypneic ( rr - 28 ). He however was coherent with good return tidal volume on the bipap machine. I requested that he not be intubated and brought immediately to ICU. His serum HcO3 is >45 ( which it was on his last admission in Nov. 2021 ) indicating severe chronic CO2 retention. Bipap will result in a better outcome for this patient than intubation if can be avoided. Will titrate bipap via inspiratory pressure and rise time to address the patient's air hunger and work of breathing while monitoring arterial blood gases. treat with systemic corticosteroids and antibiotics ( ceftriaxone/azythromycin ), bronchodilators. Dx- COPD exacerbation, acute on chronic hypercapneic respiratory failure. I reviewed all laboratory and roentgenographic data and any previous medical record from [facility] that existed. I also discussed the case with the ED attending or referring physician.

## 2022-03-09 NOTE — H&P ADULT - PROBLEM SELECTOR PLAN 5
-ECHO in July 2021 shows EF >55% with enlarge RV but normal pressure   -Pt is on Lasix 20mg OD   -ProBNP on admission 900, trop neg, no new ischemic changes on EKG   -Will start on IV Lasix 20mg OD with holding parameters to avoid fluid overload -on gabapentin   -NPO for now   -Resume meds once starts on diet

## 2022-03-09 NOTE — CONSULT NOTE ADULT - SUBJECTIVE AND OBJECTIVE BOX
Time of visit:    CHIEF COMPLAINT: Patient is a 61y old  Male who presents with a chief complaint of COPD exacerbation (09 Mar 2022 19:08)      HPI:  62 yo M, from NH, active smoker PMHx of CAD, ?CHF, COPD, peripheral neuropathy, GERD, HTN, HLD, obesity, polyarthritis, Pulmonary HTN, TIA, Vit D Def came with chief complain of shortness of breadth . As per ED triage pt BIAPAP machine was malfunctioning in NH. He used BIPAP at night in NH. Pt is a mild historian and unable to provide detailed hx due to BIPAP and lethargic in the setting of CO2 retention. On assessment he is lethargic but arousable and follows simple commands. Pt stated that he came to the hospital because he is having shortness of breadth for one month. He denied any chest pain, N/V/D.     Of note Pt non complaint to his BIPAP use . In Aug 2021 he was intubated for COPD exacerbation. In Nov 2021 again admitted for COPD exacerbation.    ED course: Pt has increase work of breathing. ABG shows CO2 125, PH 7.18 obtained on 4lit NC . After using bipap repeat blood gas on 3 lit NC shows Ph 7.3 , CO2 >125.   (09 Mar 2022 16:38)   Patient seen and examined.     PAST MEDICAL & SURGICAL HISTORY:  COPD (chronic obstructive pulmonary disease)  Oxygen 2-3L at home    Stented coronary artery  2017    HLD (hyperlipidemia)    Pulmonary HTN    Type 2 diabetes mellitus without complication, with long-term current use of insulin    Coronary artery disease involving native coronary artery of native heart without angina pectoris    Smoker    Gastroesophageal reflux disease, esophagitis presence not specified    Oxygen dependent    DM (diabetes mellitus)    CAD (coronary artery disease)    GERD (gastroesophageal reflux disease)    Insomnia    CHF (congestive heart failure)    HTN (hypertension)    Mood disorder    No significant past surgical history        Allergies    No Known Allergies    Intolerances        MEDICATIONS  (STANDING):  ALBUTerol    90 MICROgram(s) HFA Inhaler 2 Puff(s) Inhalation every 6 hours  aspirin Suppository 300 milliGRAM(s) Rectal daily  azithromycin  IVPB      budesonide 160 MICROgram(s)/formoterol 4.5 MICROgram(s) Inhaler 2 Puff(s) Inhalation two times a day  cefTRIAXone   IVPB 1000 milliGRAM(s) IV Intermittent every 24 hours  dextrose 50% Injectable 50 milliLiter(s) IV Push once  enoxaparin Injectable 40 milliGRAM(s) SubCutaneous every 24 hours  furosemide   Injectable 20 milliGRAM(s) IV Push daily  hydrocortisone sodium succinate Injectable 40 milliGRAM(s) IV Push every 12 hours  insulin lispro (ADMELOG) corrective regimen sliding scale   SubCutaneous every 6 hours  insulin regular  human recombinant. 5 Unit(s) SubCutaneous once  pantoprazole  Injectable 40 milliGRAM(s) IV Push daily  sodium polystyrene sulfonate Enema 15 Gram(s) Rectal once      MEDICATIONS  (PRN):   Medications up to date at time of exam.    Medications up to date at time of exam.    FAMILY HISTORY:  No family history of chronic obstructive pulmonary disease    No family history of hypertension    No family history of mental disorder    FH: myocardial infarction (Mother)        SOCIAL HISTORY  Smoking History: [  x ] smoking/smoke exposure, [   ] former smoker  Living Condition: [   ] apartment, [   ] private house  Work History:   Travel History: denies recent travel  Illicit Substance Use: denies  Alcohol Use: denies    REVIEW OF SYSTEMS:    CONSTITUTIONAL:  denies fevers, chills, sweats, weight loss    HEENT:  denies diplopia or blurred vision, sore throat or runny nose.    CARDIOVASCULAR:  denies pressure, squeezing, tightness, or heaviness about the chest; no palpitations.    RESPIRATORY:  denies SOB, cough, GIORDANO, wheezing.    GASTROINTESTINAL:  denies abdominal pain, nausea, vomiting or diarrhea.    GENITOURINARY: denies dysuria, frequency or urgency.    NEUROLOGIC:  denies numbness, tingling, seizures or weakness.    PSYCHIATRIC:  denies disorder of thought or mood.    MSK: denies swelling, redness      PHYSICAL EXAMINATION:    GENERAL: The patient is a well-developed, well-nourished, in no apparent distress.     Vital Signs Last 24 Hrs  T(C): 37.2 (09 Mar 2022 15:38), Max: 37.2 (09 Mar 2022 15:38)  T(F): 98.9 (09 Mar 2022 15:38), Max: 98.9 (09 Mar 2022 15:38)  HR: 113 (09 Mar 2022 15:38) (113 - 118)  BP: 126/78 (09 Mar 2022 15:38) (126/78 - 129/69)  BP(mean): --  RR: 17 (09 Mar 2022 10:55) (17 - 17)  SpO2: 90% (09 Mar 2022 15:38) (90% - 99%)   (if applicable)    Chest Tube (if applicable)    HEENT: Head is normocephalic and atraumatic. Extraocular muscles are intact. Mucous membranes are moist.     NECK: Supple, no palpable adenopathy.    LUNGS: Clear to auscultation, no wheezing, rales, or rhonchi.    HEART: Regular rate and rhythm without murmur.    ABDOMEN: Soft, nontender, and nondistended.  No hepatosplenomegaly is noted.    RENAL: No difficulty voiding, no pelvic pain    EXTREMITIES: Without any cyanosis, clubbing, rash, lesions or edema.    NEUROLOGIC: Awake, alert, oriented, grossly intact    SKIN: Warm, dry, good turgor.      LABS:                        13.3   10.69 )-----------( 150      ( 09 Mar 2022 12:42 )             46.3     03-09    133<L>  |  89<L>  |  20<H>  ----------------------------<  142<H>  5.6<H>   |  >45<HH>  |  0.58    Ca    8.8      09 Mar 2022 12:42    TPro  7.3  /  Alb  3.2<L>  /  TBili  0.3  /  DBili  x   /  AST  17  /  ALT  29  /  AlkPhos  76  03-09    PT/INR - ( 09 Mar 2022 12:42 )   PT: 12.4 sec;   INR: 1.04 ratio         PTT - ( 09 Mar 2022 12:42 )  PTT:32.2 sec    ABG - ( 09 Mar 2022 16:31 )  pH, Arterial: 7.30  pH, Blood: x     /  pCO2: >125  /  pO2: 57    / HCO3: incalculable / Base Excess: incalculable /  SaO2: 90                    D-Dimer Assay, Quantitative: <150 ng/mL DDU (03-09-22 @ 12:42)    Serum Pro-Brain Natriuretic Peptide: 900 pg/mL (03-09-22 @ 12:42)    Lactate, Blood: 0.5 mmol/L (03-09-22 @ 12:42)        MICROBIOLOGY: (if applicable)    RADIOLOGY & ADDITIONAL STUDIES:  EKG:   CXR: < from: Xray Chest 1 View- PORTABLE-Urgent (Xray Chest 1 View- PORTABLE-Urgent .) (03.09.22 @ 13:36) >    ACC: [id number] EXAM:  XR CHEST PORTABLE URGENT 1V                          PROCEDURE DATE:  03/09/2022          INTERPRETATION:  Portable chest radiograph    CLINICAL INFORMATION: Dyspnea, shortness of breath.    TECHNIQUE:  Portable  AP chest radiograph.    COMPARISON: 11/13/2021 chest .    FINDINGS:  CATHETERS AND TUBES: None    PULMONARY: RIGHT hilar/Bibasilar diffuse airspace disease..   No pneumothorax.    HEART/VASCULAR: The  heart is enlarged in transverse diameter. .    BONES: Visualizedosseous structures are intact.    IMPRESSION:   Cardiomegaly.  Perihilar/Bibasilar diffuse airspace disease..    --- End of Report ---            CHAU YAP MD; Attending Radiologist  This document has been electronically signed. Mar  9 64999:[id number]    < end of copied text >    ECHO:    IMPRESSION: 61y Male PAST MEDICAL & SURGICAL HISTORY:  COPD (chronic obstructive pulmonary disease)  Oxygen 2-3L at home    Stented coronary artery  2017    HLD (hyperlipidemia)    Pulmonary HTN    Type 2 diabetes mellitus without complication, with long-term current use of insulin    Coronary artery disease involving native coronary artery of native heart without angina pectoris    Smoker    Gastroesophageal reflux disease, esophagitis presence not specified    Oxygen dependent    DM (diabetes mellitus)    CAD (coronary artery disease)    GERD (gastroesophageal reflux disease)    Insomnia    CHF (congestive heart failure)    HTN (hypertension)    Mood disorder    No significant past surgical history     p/w                   RECOMMENDATIONS:   Time of visit:    CHIEF COMPLAINT: Patient is a 61y old  Male who presents with a chief complaint of COPD exacerbation (09 Mar 2022 19:08)      HPI:  60 yo M, from NH, active smoker PMHx of CAD, ?CHF, COPD, peripheral neuropathy, GERD, HTN, HLD, obesity, polyarthritis, Pulmonary HTN, TIA, Vit D Def came with chief complain of shortness of breadth . As per ED triage pt BIAPAP machine was malfunctioning in NH. He used BIPAP at night in NH. Pt is a mild historian and unable to provide detailed hx due to BIPAP and lethargic in the setting of CO2 retention. On assessment he is lethargic but arousable and follows simple commands. Pt stated that he came to the hospital because he is having shortness of breadth for one month. He denied any chest pain, N/V/D.     Of note Pt non complaint to his BIPAP use . In Aug 2021 he was intubated for COPD exacerbation. In Nov 2021 again admitted for COPD exacerbation.    ED course: Pt has increase work of breathing. ABG shows CO2 125, PH 7.18 obtained on 4lit NC . After using bipap repeat blood gas on 3 lit NC shows Ph 7.3 , CO2 >125.   (09 Mar 2022 16:38)   Patient seen and examined.     PAST MEDICAL & SURGICAL HISTORY:  COPD (chronic obstructive pulmonary disease)  Oxygen 2-3L at home    Stented coronary artery  2017    HLD (hyperlipidemia)    Pulmonary HTN    Type 2 diabetes mellitus without complication, with long-term current use of insulin    Coronary artery disease involving native coronary artery of native heart without angina pectoris    Smoker    Gastroesophageal reflux disease, esophagitis presence not specified    Oxygen dependent    DM (diabetes mellitus)    CAD (coronary artery disease)    GERD (gastroesophageal reflux disease)    Insomnia    CHF (congestive heart failure)    HTN (hypertension)    Mood disorder    No significant past surgical history        Allergies    No Known Allergies    Intolerances        MEDICATIONS  (STANDING):  ALBUTerol    90 MICROgram(s) HFA Inhaler 2 Puff(s) Inhalation every 6 hours  aspirin Suppository 300 milliGRAM(s) Rectal daily  azithromycin  IVPB      budesonide 160 MICROgram(s)/formoterol 4.5 MICROgram(s) Inhaler 2 Puff(s) Inhalation two times a day  cefTRIAXone   IVPB 1000 milliGRAM(s) IV Intermittent every 24 hours  dextrose 50% Injectable 50 milliLiter(s) IV Push once  enoxaparin Injectable 40 milliGRAM(s) SubCutaneous every 24 hours  furosemide   Injectable 20 milliGRAM(s) IV Push daily  hydrocortisone sodium succinate Injectable 40 milliGRAM(s) IV Push every 12 hours  insulin lispro (ADMELOG) corrective regimen sliding scale   SubCutaneous every 6 hours  insulin regular  human recombinant. 5 Unit(s) SubCutaneous once  pantoprazole  Injectable 40 milliGRAM(s) IV Push daily  sodium polystyrene sulfonate Enema 15 Gram(s) Rectal once      MEDICATIONS  (PRN):   Medications up to date at time of exam.    Medications up to date at time of exam.    FAMILY HISTORY:  No family history of chronic obstructive pulmonary disease    No family history of hypertension    No family history of mental disorder    FH: myocardial infarction (Mother)        SOCIAL HISTORY  Smoking History: [  x ] smoking/smoke exposure, [   ] former smoker  Living Condition: [   ] apartment, [   ] private house  Work History:   Travel History: denies recent travel  Illicit Substance Use: denies  Alcohol Use: denies    REVIEW OF SYSTEMS:    CONSTITUTIONAL:  denies fevers, chills, sweats, weight loss    HEENT:  denies diplopia or blurred vision, sore throat or runny nose.    CARDIOVASCULAR:  denies pressure, squeezing, tightness, or heaviness about the chest; no palpitations.    RESPIRATORY:  denies SOB, cough, GIORDANO, wheezing.    GASTROINTESTINAL:  denies abdominal pain, nausea, vomiting or diarrhea.    GENITOURINARY: denies dysuria, frequency or urgency.    NEUROLOGIC:  denies numbness, tingling, seizures or weakness.    PSYCHIATRIC:  denies disorder of thought or mood.    MSK: denies swelling, redness      PHYSICAL EXAMINATION:    GENERAL: The patient is a well-developed, well-nourished, in no apparent distress.     Vital Signs Last 24 Hrs  T(C): 37.2 (09 Mar 2022 15:38), Max: 37.2 (09 Mar 2022 15:38)  T(F): 98.9 (09 Mar 2022 15:38), Max: 98.9 (09 Mar 2022 15:38)  HR: 113 (09 Mar 2022 15:38) (113 - 118)  BP: 126/78 (09 Mar 2022 15:38) (126/78 - 129/69)  BP(mean): --  RR: 17 (09 Mar 2022 10:55) (17 - 17)  SpO2: 90% (09 Mar 2022 15:38) (90% - 99%)   (if applicable)    Chest Tube (if applicable)    HEENT: Head is normocephalic and atraumatic. Extraocular muscles are intact. Mucous membranes are moist.  + BiPaP     NECK: Supple, no palpable adenopathy. using accessory muscle for respiration     LUNGS: decrease b/l breath sounds     HEART: Regular rate and rhythm without murmur.    ABDOMEN: Soft, nontender, and nondistended.  No hepatosplenomegaly is noted.    RENAL: No difficulty voiding, no pelvic pain    EXTREMITIES: Without any cyanosis, clubbing, rash, lesions or edema.    NEUROLOGIC: Awake, alert, oriented, grossly intact    SKIN: Warm, dry, good turgor.      LABS:                        13.3   10.69 )-----------( 150      ( 09 Mar 2022 12:42 )             46.3     03-09    133<L>  |  89<L>  |  20<H>  ----------------------------<  142<H>  5.6<H>   |  >45<HH>  |  0.58    Ca    8.8      09 Mar 2022 12:42    TPro  7.3  /  Alb  3.2<L>  /  TBili  0.3  /  DBili  x   /  AST  17  /  ALT  29  /  AlkPhos  76  03-09    PT/INR - ( 09 Mar 2022 12:42 )   PT: 12.4 sec;   INR: 1.04 ratio         PTT - ( 09 Mar 2022 12:42 )  PTT:32.2 sec    ABG - ( 09 Mar 2022 16:31 )  pH, Arterial: 7.30  pH, Blood: x     /  pCO2: >125  /  pO2: 57    / HCO3: incalculable / Base Excess: incalculable /  SaO2: 90                    D-Dimer Assay, Quantitative: <150 ng/mL DDU (03-09-22 @ 12:42)    Serum Pro-Brain Natriuretic Peptide: 900 pg/mL (03-09-22 @ 12:42)    Lactate, Blood: 0.5 mmol/L (03-09-22 @ 12:42)        MICROBIOLOGY: (if applicable)    RADIOLOGY & ADDITIONAL STUDIES:  EKG:   CXR: < from: Xray Chest 1 View- PORTABLE-Urgent (Xray Chest 1 View- PORTABLE-Urgent .) (03.09.22 @ 13:36) >    ACC: [id number] EXAM:  XR CHEST PORTABLE URGENT 1V                          PROCEDURE DATE:  03/09/2022          INTERPRETATION:  Portable chest radiograph    CLINICAL INFORMATION: Dyspnea, shortness of breath.    TECHNIQUE:  Portable  AP chest radiograph.    COMPARISON: 11/13/2021 chest .    FINDINGS:  CATHETERS AND TUBES: None    PULMONARY: RIGHT hilar/Bibasilar diffuse airspace disease..   No pneumothorax.    HEART/VASCULAR: The  heart is enlarged in transverse diameter. .    BONES: Visualizedosseous structures are intact.    IMPRESSION:   Cardiomegaly.  Perihilar/Bibasilar diffuse airspace disease..    --- End of Report ---            CHAU YAP MD; Attending Radiologist  This document has been electronically signed. Mar  9 58196:[id number]    < end of copied text >    ECHO:    IMPRESSION: 61y Male PAST MEDICAL & SURGICAL HISTORY:  COPD (chronic obstructive pulmonary disease)  Oxygen 2-3L at home    Stented coronary artery  2017    HLD (hyperlipidemia)    Pulmonary HTN    Type 2 diabetes mellitus without complication, with long-term current use of insulin    Coronary artery disease involving native coronary artery of native heart without angina pectoris    Smoker    Gastroesophageal reflux disease, esophagitis presence not specified    Oxygen dependent    DM (diabetes mellitus)    CAD (coronary artery disease)    GERD (gastroesophageal reflux disease)    Insomnia    CHF (congestive heart failure)    HTN (hypertension)    Mood disorder    No significant past surgical history     p/w         IMP: This is a 61 yr old white man  from NH, active smoker with  CAD, ?CHF, COPD, peripheral neuropathy, GERD, HTN, HLD, obesity, polyarthritis, Pulmonary HTN, TIA, Vit D Def came with chief complain of shortness of breadth . As per ED triage pt BIAPAP machine was malfunctioning in NH. He used BIPAP at night in NH  Pat admitted for acute on chronic hypoxic hypercapnic resp failure due to acute ex of COPD requiring NiPPV support.  Pat is have some resp distress while on BiPaP and is a risk for intubation.     Sugg  - ICU eval   - Continue BiPaP with supp O2   - Solumedrol   - Albuterol neb   - Advise to stop smoking   - DVT GI prophy     message sent to dr Ramos

## 2022-03-09 NOTE — ED PROVIDER NOTE - OBJECTIVE STATEMENT
62 y/o male, history of CHF, COPD, hypertension, hyperlipidemia, pulmonary hypertension, mood disorder, sent for shortness of breath. As per triage, his CPAP machine was malfunctioning. In ED, patient is on O2, satting normally, denying any complaints. Will discuss w/ PMD plan of care at this point if they are still having device issues. No known drug allergies.

## 2022-03-09 NOTE — H&P ADULT - PROBLEM SELECTOR PLAN 3
- in July 2021   -Not on STATIN  -NPO due to BIPAP and somnolence in the setting of CO2 retention   -f/u lipid profile -K 5.8  -Will give 5units of regular insulin with D50 and Kayexalate  enema   -BMP q12hr   -EKG without any new ischemic changes   -Trop neg , ProBNP 900  -Avoid nephrotoxic agents

## 2022-03-09 NOTE — ED PROVIDER NOTE - NSICDXPASTMEDICALHX_GEN_ALL_CORE_FT
PAST MEDICAL HISTORY:  CAD (coronary artery disease)     COPD (chronic obstructive pulmonary disease) Oxygen 2-3L at home    Coronary artery disease involving native coronary artery of native heart without angina pectoris     DM (diabetes mellitus)     Gastroesophageal reflux disease, esophagitis presence not specified     GERD (gastroesophageal reflux disease)     HLD (hyperlipidemia)     Insomnia     Oxygen dependent     Pulmonary HTN     Smoker     Stented coronary artery 2017    Type 2 diabetes mellitus without complication, with long-term current use of insulin       CHF (congestive heart failure)     HTN (hypertension)     Mood disorder

## 2022-03-09 NOTE — ED ADULT NURSE NOTE - CHIEF COMPLAINT QUOTE
ADIS from [facility] with c/o difficulty breathing secondary to device malfunction, oxygen dependent

## 2022-03-09 NOTE — ED PROVIDER NOTE - PROGRESS NOTE DETAILS
case discussed with Dr. rhodes, pt sent for hypoxia.  Plan for patient to be admitted. labs signfiicant for CO2 over 45.  Pt retaining.  Bipap ordered.

## 2022-03-09 NOTE — H&P ADULT - PROBLEM SELECTOR PLAN 2
-K 5.8  -Will give 5units of regular insulin with D50 and Kayexalate  enema   -BMP q12hr   -EKG without any new ischemic changes   -Trop neg , ProBNP 900  -Avoid nephrotoxic agents -ECHO in July 2021 shows EF >55% with enlarge RV but normal pressure   -Chest X-ray with b/l infiltrates with ?vascular congestion (f/u official read)  -Pt is on Lasix 20mg OD   -ProBNP on admission 900, trop neg, no new ischemic changes on EKG   -Will start on IV Lasix 20mg OD with holding parameters

## 2022-03-09 NOTE — CONSULT NOTE ADULT - SUBJECTIVE AND OBJECTIVE BOX
Patient is a 61y old  Male who presents with a chief complaint of COPD exacerbation (09 Mar 2022 19:16)    HPI:  62 yo M, from NH, active smoker PMHx of CAD, ?CHF, COPD, peripheral neuropathy, GERD, HTN, HLD, obesity, polyarthritis, Pulmonary HTN, TIA, Vit D Def came with chief complain of shortness of breadth . As per ED triage pt BIAPAP machine was malfunctioning in NH. He used BIPAP at night in NH. Pt is a mild historian and unable to provide detailed hx due to BIPAP and lethargic in the setting of CO2 retention. On assessment he is lethargic but arousable and follows simple commands. Pt stated that he came to the hospital because he is having shortness of breadth for one month. He denied any chest pain, N/V/D.     Of note Pt non complaint to his BIPAP use . In Aug 2021 he was intubated for COPD exacerbation. In Nov 2021 again admitted for COPD exacerbation.    ED course: Pt has increase work of breathing. ABG shows CO2 125, PH 7.18 obtained on 4lit NC . After using bipap repeat blood gas on 3 lit NC shows Ph 7.3 , CO2 >125.   (09 Mar 2022 16:38)    Interval history: RRT called in ED due to worsening SOB and hypoxia. Patient placed on BiPAP and given solumedrol 125mg. Patient to be transferred to ICU for closer monitoring.     Allergies  No Known Allergies    MEDICATIONS  (STANDING):  ALBUTerol    90 MICROgram(s) HFA Inhaler 2 Puff(s) Inhalation every 6 hours  aspirin Suppository 300 milliGRAM(s) Rectal daily  azithromycin  IVPB      budesonide 160 MICROgram(s)/formoterol 4.5 MICROgram(s) Inhaler 2 Puff(s) Inhalation two times a day  cefTRIAXone   IVPB 1000 milliGRAM(s) IV Intermittent every 24 hours  chlorhexidine 2% Cloths 1 Application(s) Topical <User Schedule>  dextrose 50% Injectable 50 milliLiter(s) IV Push once  enoxaparin Injectable 40 milliGRAM(s) SubCutaneous every 24 hours  furosemide   Injectable 20 milliGRAM(s) IV Push daily  insulin lispro (ADMELOG) corrective regimen sliding scale   SubCutaneous every 6 hours  insulin regular  human recombinant. 5 Unit(s) SubCutaneous once  methylPREDNISolone sodium succinate Injectable 40 milliGRAM(s) IV Push every 8 hours  pantoprazole  Injectable 40 milliGRAM(s) IV Push daily  sodium polystyrene sulfonate Enema 15 Gram(s) Rectal once    Daily Height in cm: 172.72 (09 Mar 2022 10:55)    Daily     Drug Dosing Weight  Height (cm): 172.7 (09 Mar 2022 10:55)  Weight (kg): 93.9 (09 Mar 2022 10:55)  BMI (kg/m2): 31.5 (09 Mar 2022 10:55)  BSA (m2): 2.07 (09 Mar 2022 10:55)    PAST MEDICAL & SURGICAL HISTORY:  COPD (chronic obstructive pulmonary disease)  Oxygen 2-3L at home    Stented coronary artery  2017    HLD (hyperlipidemia)    Pulmonary HTN    Type 2 diabetes mellitus without complication, with long-term current use of insulin    Coronary artery disease involving native coronary artery of native heart without angina pectoris    Smoker    Gastroesophageal reflux disease, esophagitis presence not specified    Oxygen dependent    DM (diabetes mellitus)    CAD (coronary artery disease)    GERD (gastroesophageal reflux disease)    Insomnia    CHF (congestive heart failure)    HTN (hypertension)    Mood disorder    No significant past surgical history    FAMILY HISTORY:  No family history of chronic obstructive pulmonary disease    No family history of hypertension    No family history of mental disorder    FH: myocardial infarction (Mother)    SOCIAL HISTORY: Unable to obtain complete SH due to patient being on BiPAP    ADVANCE DIRECTIVES: Full code     REVIEW OF SYSTEMS:  Unable to obtain complete ROS due to patient being on BiPAP     ICU Vital Signs Last 24 Hrs  T(C): 37.2 (09 Mar 2022 15:38), Max: 37.2 (09 Mar 2022 15:38)  T(F): 98.9 (09 Mar 2022 15:38), Max: 98.9 (09 Mar 2022 15:38)  HR: 118 (09 Mar 2022 19:10) (113 - 118)  BP: 118/76 (09 Mar 2022 19:10) (118/76 - 129/69)  RR: 18 (09 Mar 2022 12:43) (17 - 18)  SpO2: 91% (09 Mar 2022 19:10) (90% - 99%)    ABG - ( 09 Mar 2022 16:31 )  pH, Arterial: 7.30  pH, Blood: x     /  pCO2: >125  /  pO2: 57    / HCO3: incalculable / Base Excess: incalculable /  SaO2: 90        PHYSICAL EXAM:  GENERAL: obese male; moderate respiratory distress on BiPAP; able to answer questions   HEAD: Normocephalic  EYES: PERRLA, conjunctiva and sclera clear  ENMT: Moist mucous membranes  NECK: Supple  NERVOUS SYSTEM: Alert & Oriented X3  CHEST/LUNG: decreased breath sounds bilaterally, almost quiet breath sounds; unable to assess any wheezing  HEART: Regular rate and rhythm; No murmurs  ABDOMEN: Soft, Nontender, Nondistended; Bowel sounds present  EXTREMITIES: No clubbing, cyanosis, or edema  SKIN: No rash     LABS:  CBC Full  -  ( 09 Mar 2022 12:42 )  WBC Count : 10.69 K/uL  RBC Count : 4.74 M/uL  Hemoglobin : 13.3 g/dL  Hematocrit : 46.3 %  Platelet Count - Automated : 150 K/uL  Mean Cell Volume : 97.7 fl  Mean Cell Hemoglobin : 28.1 pg  Mean Cell Hemoglobin Concentration : 28.7 gm/dL  Auto Neutrophil # : 9.25 K/uL  Auto Lymphocyte # : 0.67 K/uL  Auto Monocyte # : 0.53 K/uL  Auto Eosinophil # : 0.03 K/uL  Auto Basophil # : 0.03 K/uL  Auto Neutrophil % : 86.4 %  Auto Lymphocyte % : 6.3 %  Auto Monocyte % : 5.0 %  Auto Eosinophil % : 0.3 %  Auto Basophil % : 0.3 %    03-09    133<L>  |  89<L>  |  20<H>  ----------------------------<  142<H>  5.6<H>   |  >45<HH>  |  0.58    Ca    8.8      09 Mar 2022 12:42    TPro  7.3  /  Alb  3.2<L>  /  TBili  0.3  /  DBili  x   /  AST  17  /  ALT  29  /  AlkPhos  76  03-09    CAPILLARY BLOOD GLUCOSE      POCT Blood Glucose.: 67 mg/dL (09 Mar 2022 19:38)    PT/INR - ( 09 Mar 2022 12:42 )   PT: 12.4 sec;   INR: 1.04 ratio         PTT - ( 09 Mar 2022 12:42 )  PTT:32.2 sec

## 2022-03-09 NOTE — H&P ADULT - ASSESSMENT
60 yo M, from NH, active smoker PMHx of CAD, ?CHF, COPD, peripheral neuropathy, GERD, HTN, HLD, obesity, polyarthritis, Pulmonary HTN, TIA, Vit D Def came with chief complain of shortness of breadth . admitted for COPD exacerbation.

## 2022-03-09 NOTE — H&P ADULT - PROBLEM SELECTOR PLAN 8
-On Symbicort, albuterol and was on prednisone 10mg for 30 days   -Rest of management as above in problem no 1

## 2022-03-09 NOTE — H&P ADULT - PROBLEM SELECTOR PLAN 4
-on gabapentin   -NPO for now   -Resume meds once starts on diet - in July 2021   -Not on STATIN  -NPO due to BIPAP and somnolence in the setting of CO2 retention   -f/u lipid profile

## 2022-03-09 NOTE — H&P ADULT - ATTENDING COMMENTS
PATIENT WAS SEEN AND EXAMINED. D/W FLOOR TEAM & ICU TEAM     - COPD EXACERBATION, HYPOXIC & HYPERCAPNOEIC RESPIRATORY FAILURE - STARTED ON IV METHYLPREDNISOLONE , ALBUTEROL, BiPAP. ICU CONSULT REQUESTED. PATIENT MAY REQUIRE INTUBATION TO PREVENT IMPENDING RESPIRATORY FAILURE. RAPID RESPONSE WAS CALLED DUE TO TACHYPNOEA     - GI AND DVT PROPHYLAXIS    - DR. REFUGIO CABELLO

## 2022-03-09 NOTE — H&P ADULT - PROBLEM SELECTOR PLAN 7
-On aspirin and Lasix 20mg PO OD  -Will start on IV 20mg of Lasix once daily  -Will do aspirin suppository for now

## 2022-03-09 NOTE — ED ADULT TRIAGE NOTE - CHIEF COMPLAINT QUOTE
ADIS from [facility] with c/o difficulty breathing secondary to device malfunction, oxygen dependent

## 2022-03-09 NOTE — H&P ADULT - HISTORY OF PRESENT ILLNESS
62 yo M, from NH, active smoker PMHx of CAD, ?CHF, COPD, peripheral neuropathy, GERD, HTN, HLD, obesity, polyarthritis, Pulmonary HTN, TIA, Vit D Def came with chief complain of shortness of breadth . As per ED triage pt BIAPAP machine was malfunctioning in NH. He used BIPAP at night in NH. Pt is a mild historian and unable to provide detailed hx due to BIPAP and lethargic in the setting of CO2 retention. On assessment he is lethargic but arousable and follows simple commands. Pt stated that he came to the hospital because he is having shortness of breadth for one month. He denied any chest pain, N/V/D.     Of note Pt non complaint to his BIPAP use . In Aug 2021 he was intubated for COPD exacerbation. In Nov 2021 again admitted for COPD exacerbation.    ED course: Pt has increase work of breathing. ABG shows CO2 125, PH 7.18 obtained on 4lit NC . After using bipap repeat blood gas on 3 lit NC shows Ph 7.3 , CO2 >125.

## 2022-03-09 NOTE — RAPID RESPONSE TEAM SUMMARY - NSSITUATIONBACKGROUNDRRT_GEN_ALL_CORE
62 yo M, from NH, active smoker PMHx of CAD, ?CHF, COPD, peripheral neuropathy, GERD, HTN, HLD, obesity, polyarthritis, Pulmonary HTN, TIA, Vit D Def came with chief complain of shortness of breadth . As per ED triage pt BIAPAP machine was malfunctioning in NH. He used BIPAP at night in NH. Pt is a mild historian and unable to provide detailed hx due to BIPAP and lethargic in the setting of CO2 retention. On assessment he is lethargic but arousable and follows simple commands. Pt stated that he came to the hospital because he is having shortness of breadth for one month. He denied any chest pain, N/V/D.     Of note Pt non complaint to his BIPAP use . In Aug 2021 he was intubated for COPD exacerbation. In Nov 2021 again admitted for COPD exacerbation.    ED course: Pt has increase work of breathing. ABG shows CO2 125, PH 7.18 obtained on 4lit NC . After using bipap repeat blood gas on 3 lit NC shows Ph 7.3 , CO2 >125.      RRT called in ED due to worsening SOB and hypoxia. Patient placed on BiPAP and given solumedrol 125mg. Patient to be transferred to ICU for closer monitoring.

## 2022-03-09 NOTE — H&P ADULT - NSHPPHYSICALEXAM_GEN_ALL_CORE
Vital Signs Last 24 Hrs  T(C): 37.2 (09 Mar 2022 15:38), Max: 37.2 (09 Mar 2022 15:38)  T(F): 98.9 (09 Mar 2022 15:38), Max: 98.9 (09 Mar 2022 15:38)  HR: 113 (09 Mar 2022 15:38) (113 - 118)  BP: 126/78 (09 Mar 2022 15:38) (126/78 - 129/69)  BP(mean): --  RR: 17 (09 Mar 2022 10:55) (17 - 17)  SpO2: 90% (09 Mar 2022 15:38) (90% - 99%)    GENERAL: Mod respiratory distress off BIPAP  EYES: EOMI, PERRLA,   NECK: Supple, No JVD  CHEST/LUNG: Decrease breadth sounds R >L  HEART: Regular rate and rhythm; No murmurs, +ve S1 S2   ABDOMEN: Soft, Nontender, Nondistended; Bowel sounds present  NERVOUS SYSTEM:  Alert & Oriented X2-3,   EXTREMITIES:   No clubbing, cyanosis, +1 pitting edema

## 2022-03-09 NOTE — CONSULT NOTE ADULT - ASSESSMENT
Assessment:  - Acute hypoxic respiratory failure   - COPD exacerbation   - CHF   - Respiratory acidosis   - HTN  - HLD  - CAD  - GERD  - Pulmonary HTN   - Obesity   - Mood disorder   - DM   - Hyperkalemia     Plan:  Neuro:  - no acute issues     Cardiovascular:  #CHF     #HTN    #HLD     #CAD    Pulmonary:   #Acute hypoxic respiratory failure likely due to COPD exacerbation     #Respiratory acidosis     Infectious Diseases:  - no acute issues   - ctx and zithro for COPD exacerbation     Gastrointestinal:  - will keep NPO for now while on BiPAP  - continue with PPI     Renal:  #Hyperkalemia  - noted to have potassium of 5.6  - D50 and insulin and kayexelate ordered to be given   - monitor BMP daily     #Hyponatremia  - noted to have sodium of 133 on admission   - monitor BMP for now     Heme/onc:   - no acute issues     Endo:   #DM  - monitor BS q6 while patient is NPO  - f/u A1c  - monitor BS and adjust insulin as needed     Skin/ catheter:   - no acute issues     Prophylaxis:   - lovenox for DVT prophylaxis  - PPI for GI prophylaxis     Goals of Care:   - Full Code     Dispo: Transfer to ICU  Patient is a 61 year old male, from NH, active smoker PMHx of CAD, ?CHF, COPD, peripheral neuropathy, GERD, HTN, HLD, obesity, polyarthritis, Pulmonary HTN, TIA, Vit D Def came with chief complain of shortness of breath.   RRT called in ED due to worsening SOB and hypoxia. Patient placed on BiPAP and given solumedrol 125mg. Patient to be transferred to ICU for closer monitoring.     Assessment:  - Acute hypoxic hypercapnic respiratory failure   - COPD exacerbation   - CHF   - Respiratory acidosis   - HTN  - HLD  - CAD  - GERD  - Pulmonary HTN   - Obesity   - Mood disorder   - DM   - Hyperkalemia     Plan:  Neuro:  - no acute issues     Cardiovascular:  #CHF   - patient with history of CHF  - last ECHO showed normal EF last year   - will give lasix 20mg IV for now   - repeat ECHO   - fluid restrictions  - strict I/O  - daily weight     #HTN  - monitor BP and resume meds as needed    #HLD   - continue with statin when able to take PO meds     #CAD  - continue with aspirin when able to take PO meds     Pulmonary:   #Acute hypoxic hypercapnic respiratory failure likely due to COPD exacerbation   - patient presented due to SOB   - likely due to noncompliance of CPAP machine   - patient noted to be tachypneic in ED and placed on BiPAP  - given solumedrol 125mg in ED  - will continue with solumedrol 40mg q8 for now  - will give CTX and zithro   - continue BiPAP for now   - monitor O2 sat   - repeat ABG in AM   - repeat CXR in AM   - albuterol and symbicort     #Respiratory acidosis   - noted to have CO2 more 125 on ABG   - patient appears to be chronic retainer normally with CO2 in 40s   - placed on BiPAP with improvement in pH  - continue with BiPAP for now  - repeat ABG in AM     Infectious Diseases:  - no acute issues   - ctx and zithro for COPD exacerbation     Gastrointestinal:  - will keep NPO for now while on BiPAP  - continue with PPI     Renal:  #Hyperkalemia  - noted to have potassium of 5.6  - D50 and insulin and kayexelate ordered to be given   - monitor BMP daily     #Hyponatremia  - noted to have sodium of 133 on admission   - monitor BMP for now     Heme/onc:   - no acute issues     Endo:   #DM  - monitor BS q6 while patient is NPO  - f/u A1c  - monitor BS and adjust insulin as needed     Skin/ catheter:   - no acute issues     Prophylaxis:   - lovenox for DVT prophylaxis  - PPI for GI prophylaxis     Goals of Care:   - Full Code     Dispo: Transfer to ICU

## 2022-03-09 NOTE — H&P ADULT - PARTICIPANTS
unsuccessful attempt to reach out to HCP. Pt is unable to take decision at the moment given his COPD exacerbation. Remain Full Code

## 2022-03-09 NOTE — H&P ADULT - PROBLEM SELECTOR PLAN 6
On Novolin 44 units in am and 34 units at bedtime with Novolog sliding scale   -HgbA1C 10 in Nov 2021  -Pt is NPO so will start on low dose sliding scale  -f/u hgbA1C

## 2022-03-09 NOTE — H&P ADULT - PROBLEM SELECTOR PLAN 1
-c/w sob, ?malfunction BIPAP in NH, ? compliance issue   -Chest X-ray with b/l infiltrates with ?vascular congestion (f/u official read)  -ABG shows CO2 125, PH 7.18 obtained on 4lit NC . After using bipap repeat blood gas on 3 lit NC shows Ph 7.3 , CO2 >125  -On BIPAP currently , continue with BIPAP   -Will repeat blood gas overnight   -Will give one dose of IV Solumedrol 125mg   -Will start on Duoneb q6hr   -Continue with Symbicort  -Solumedrol 40mg IV BID   -Ceftriaxone and Azithromycin IV abx   -Will consult Pulm Dr. Marquez (MAR will consult)  -f/u Procalcitonin  -LOW THRESHOLD FOR ICU  -NPO for now -c/w sob, ?malfunction BIPAP in NH, ? compliance issue   -Chest X-ray with b/l infiltrates with ?vascular congestion (f/u official read)  -ABG shows CO2 125, PH 7.18 obtained on 4lit NC . After using bipap repeat blood gas on 3 lit NC shows Ph 7.3 , CO2 >125  -On BIPAP currently , continue with BIPAP   -Will repeat blood gas overnight   -Will give one dose of IV Solumedrol 125mg   -Will start on Duoneb q6hr   -Continue with Symbicort  -Solumedrol 40mg IV BID   -Ceftriaxone and Azithromycin IV abx   -Will consult Pulm Dr. Marquez (MAR will consult)  -f/u Procalcitonin  -LOW THRESHOLD FOR ICU  -COVID neg   -NPO for now

## 2022-03-10 LAB
A1C WITH ESTIMATED AVERAGE GLUCOSE RESULT: 7.4 % — HIGH (ref 4–5.6)
ALBUMIN SERPL ELPH-MCNC: 2.8 G/DL — LOW (ref 3.5–5)
ALP SERPL-CCNC: 67 U/L — SIGNIFICANT CHANGE UP (ref 40–120)
ALT FLD-CCNC: 28 U/L DA — SIGNIFICANT CHANGE UP (ref 10–60)
ANION GAP SERPL CALC-SCNC: -1 MMOL/L — LOW (ref 5–17)
ANION GAP SERPL CALC-SCNC: 3 MMOL/L — LOW (ref 5–17)
AST SERPL-CCNC: 23 U/L — SIGNIFICANT CHANGE UP (ref 10–40)
BASE EXCESS BLDA CALC-SCNC: 17.9 MMOL/L — HIGH (ref -2–3)
BASE EXCESS BLDA CALC-SCNC: 19.8 MMOL/L — HIGH (ref -2–3)
BASE EXCESS BLDA CALC-SCNC: 21.5 MMOL/L — HIGH (ref -2–3)
BASE EXCESS BLDA CALC-SCNC: 22.1 MMOL/L — HIGH (ref -2–3)
BASE EXCESS BLDA CALC-SCNC: 29.4 MMOL/L — HIGH (ref -2–3)
BASOPHILS # BLD AUTO: 0.01 K/UL — SIGNIFICANT CHANGE UP (ref 0–0.2)
BASOPHILS NFR BLD AUTO: 0.1 % — SIGNIFICANT CHANGE UP (ref 0–2)
BILIRUB SERPL-MCNC: 0.3 MG/DL — SIGNIFICANT CHANGE UP (ref 0.2–1.2)
BLOOD GAS COMMENTS ARTERIAL: SIGNIFICANT CHANGE UP
BUN SERPL-MCNC: 23 MG/DL — HIGH (ref 7–18)
BUN SERPL-MCNC: 25 MG/DL — HIGH (ref 7–18)
CALCIUM SERPL-MCNC: 8.4 MG/DL — SIGNIFICANT CHANGE UP (ref 8.4–10.5)
CALCIUM SERPL-MCNC: 8.6 MG/DL — SIGNIFICANT CHANGE UP (ref 8.4–10.5)
CHLORIDE SERPL-SCNC: 89 MMOL/L — LOW (ref 96–108)
CHLORIDE SERPL-SCNC: 91 MMOL/L — LOW (ref 96–108)
CO2 BLDA-SCNC: SIGNIFICANT CHANGE UP MMOL/L (ref 19–24)
CO2 SERPL-SCNC: 41 MMOL/L — HIGH (ref 22–31)
CO2 SERPL-SCNC: 44 MMOL/L — HIGH (ref 22–31)
CREAT ?TM UR-MCNC: 149 MG/DL — SIGNIFICANT CHANGE UP
CREAT SERPL-MCNC: 0.62 MG/DL — SIGNIFICANT CHANGE UP (ref 0.5–1.3)
CREAT SERPL-MCNC: 0.63 MG/DL — SIGNIFICANT CHANGE UP (ref 0.5–1.3)
EGFR: 110 ML/MIN/1.73M2 — SIGNIFICANT CHANGE UP
EGFR: 110 ML/MIN/1.73M2 — SIGNIFICANT CHANGE UP
EOSINOPHIL # BLD AUTO: 0.01 K/UL — SIGNIFICANT CHANGE UP (ref 0–0.5)
EOSINOPHIL NFR BLD AUTO: 0.1 % — SIGNIFICANT CHANGE UP (ref 0–6)
ESTIMATED AVERAGE GLUCOSE: 166 MG/DL — HIGH (ref 68–114)
GAS PNL BLDA: SIGNIFICANT CHANGE UP
GAS PNL BLDA: SIGNIFICANT CHANGE UP
GLUCOSE BLDC GLUCOMTR-MCNC: 175 MG/DL — HIGH (ref 70–99)
GLUCOSE BLDC GLUCOMTR-MCNC: 185 MG/DL — HIGH (ref 70–99)
GLUCOSE SERPL-MCNC: 154 MG/DL — HIGH (ref 70–99)
GLUCOSE SERPL-MCNC: 176 MG/DL — HIGH (ref 70–99)
HCO3 BLDA-SCNC: 48 MMOL/L — CRITICAL HIGH (ref 21–28)
HCO3 BLDA-SCNC: 49 MMOL/L — CRITICAL HIGH (ref 21–28)
HCO3 BLDA-SCNC: 53 MMOL/L — CRITICAL HIGH (ref 21–28)
HCO3 BLDA-SCNC: 53 MMOL/L — CRITICAL HIGH (ref 21–28)
HCO3 BLDA-SCNC: 57 MMOL/L — CRITICAL HIGH (ref 21–28)
HCT VFR BLD CALC: 42.4 % — SIGNIFICANT CHANGE UP (ref 39–50)
HGB BLD-MCNC: 12.4 G/DL — LOW (ref 13–17)
HOROWITZ INDEX BLDA+IHG-RTO: 45 — SIGNIFICANT CHANGE UP
HOROWITZ INDEX BLDA+IHG-RTO: 45 — SIGNIFICANT CHANGE UP
HOROWITZ INDEX BLDA+IHG-RTO: 50 — SIGNIFICANT CHANGE UP
HOROWITZ INDEX BLDA+IHG-RTO: 50 — SIGNIFICANT CHANGE UP
HOROWITZ INDEX BLDA+IHG-RTO: 60 — SIGNIFICANT CHANGE UP
IMM GRANULOCYTES NFR BLD AUTO: 0.5 % — SIGNIFICANT CHANGE UP (ref 0–1.5)
LYMPHOCYTES # BLD AUTO: 0.74 K/UL — LOW (ref 1–3.3)
LYMPHOCYTES # BLD AUTO: 6.5 % — LOW (ref 13–44)
MAGNESIUM SERPL-MCNC: 1.9 MG/DL — SIGNIFICANT CHANGE UP (ref 1.6–2.6)
MCHC RBC-ENTMCNC: 28.1 PG — SIGNIFICANT CHANGE UP (ref 27–34)
MCHC RBC-ENTMCNC: 29.2 GM/DL — LOW (ref 32–36)
MCV RBC AUTO: 95.9 FL — SIGNIFICANT CHANGE UP (ref 80–100)
MONOCYTES # BLD AUTO: 0.33 K/UL — SIGNIFICANT CHANGE UP (ref 0–0.9)
MONOCYTES NFR BLD AUTO: 2.9 % — SIGNIFICANT CHANGE UP (ref 2–14)
MRSA PCR RESULT.: SIGNIFICANT CHANGE UP
NEUTROPHILS # BLD AUTO: 10.16 K/UL — HIGH (ref 1.8–7.4)
NEUTROPHILS NFR BLD AUTO: 89.9 % — HIGH (ref 43–77)
NRBC # BLD: 0 /100 WBCS — SIGNIFICANT CHANGE UP (ref 0–0)
OSMOLALITY UR: 715 MOS/KG — SIGNIFICANT CHANGE UP (ref 50–1200)
PCO2 BLDA: 101 MMHG — CRITICAL HIGH (ref 35–48)
PCO2 BLDA: 67 MMHG — HIGH (ref 35–48)
PCO2 BLDA: 71 MMHG — CRITICAL HIGH (ref 35–48)
PCO2 BLDA: 89 MMHG — CRITICAL HIGH (ref 35–48)
PCO2 BLDA: 90 MMHG — CRITICAL HIGH (ref 35–48)
PH BLDA: 7.33 — LOW (ref 7.35–7.45)
PH BLDA: 7.34 — LOW (ref 7.35–7.45)
PH BLDA: 7.38 — SIGNIFICANT CHANGE UP (ref 7.35–7.45)
PH BLDA: 7.44 — SIGNIFICANT CHANGE UP (ref 7.35–7.45)
PH BLDA: 7.54 — HIGH (ref 7.35–7.45)
PHOSPHATE SERPL-MCNC: 2.6 MG/DL — SIGNIFICANT CHANGE UP (ref 2.5–4.5)
PLATELET # BLD AUTO: 158 K/UL — SIGNIFICANT CHANGE UP (ref 150–400)
PO2 BLDA: 168 MMHG — HIGH (ref 83–108)
PO2 BLDA: 64 MMHG — LOW (ref 83–108)
PO2 BLDA: 68 MMHG — LOW (ref 83–108)
PO2 BLDA: 72 MMHG — LOW (ref 83–108)
PO2 BLDA: 75 MMHG — LOW (ref 83–108)
POTASSIUM SERPL-MCNC: 5.1 MMOL/L — SIGNIFICANT CHANGE UP (ref 3.5–5.3)
POTASSIUM SERPL-MCNC: 5.5 MMOL/L — HIGH (ref 3.5–5.3)
POTASSIUM SERPL-SCNC: 5.1 MMOL/L — SIGNIFICANT CHANGE UP (ref 3.5–5.3)
POTASSIUM SERPL-SCNC: 5.5 MMOL/L — HIGH (ref 3.5–5.3)
PROT SERPL-MCNC: 6.4 G/DL — SIGNIFICANT CHANGE UP (ref 6–8.3)
RBC # BLD: 4.42 M/UL — SIGNIFICANT CHANGE UP (ref 4.2–5.8)
RBC # FLD: 13 % — SIGNIFICANT CHANGE UP (ref 10.3–14.5)
S AUREUS DNA NOSE QL NAA+PROBE: SIGNIFICANT CHANGE UP
SAO2 % BLDA: 94 % — SIGNIFICANT CHANGE UP
SAO2 % BLDA: 97 % — SIGNIFICANT CHANGE UP
SAO2 % BLDA: 99 % — SIGNIFICANT CHANGE UP
SODIUM SERPL-SCNC: 132 MMOL/L — LOW (ref 135–145)
SODIUM SERPL-SCNC: 135 MMOL/L — SIGNIFICANT CHANGE UP (ref 135–145)
SODIUM UR-SCNC: 24 MMOL/L — SIGNIFICANT CHANGE UP
WBC # BLD: 11.31 K/UL — HIGH (ref 3.8–10.5)
WBC # FLD AUTO: 11.31 K/UL — HIGH (ref 3.8–10.5)

## 2022-03-10 PROCEDURE — 71045 X-RAY EXAM CHEST 1 VIEW: CPT | Mod: 26

## 2022-03-10 PROCEDURE — 74019 RADEX ABDOMEN 2 VIEWS: CPT | Mod: 26

## 2022-03-10 PROCEDURE — 71045 X-RAY EXAM CHEST 1 VIEW: CPT | Mod: 26,77

## 2022-03-10 PROCEDURE — 93306 TTE W/DOPPLER COMPLETE: CPT | Mod: 26

## 2022-03-10 RX ORDER — PROPOFOL 10 MG/ML
10 INJECTION, EMULSION INTRAVENOUS
Qty: 500 | Refills: 0 | Status: DISCONTINUED | OUTPATIENT
Start: 2022-03-10 | End: 2022-03-10

## 2022-03-10 RX ORDER — DEXMEDETOMIDINE HYDROCHLORIDE IN 0.9% SODIUM CHLORIDE 4 UG/ML
0.5 INJECTION INTRAVENOUS
Qty: 200 | Refills: 0 | Status: DISCONTINUED | OUTPATIENT
Start: 2022-03-10 | End: 2022-03-10

## 2022-03-10 RX ORDER — DEXTROSE 50 % IN WATER 50 %
50 SYRINGE (ML) INTRAVENOUS ONCE
Refills: 0 | Status: COMPLETED | OUTPATIENT
Start: 2022-03-10 | End: 2022-03-10

## 2022-03-10 RX ORDER — SODIUM CHLORIDE 9 MG/ML
1000 INJECTION INTRAMUSCULAR; INTRAVENOUS; SUBCUTANEOUS ONCE
Refills: 0 | Status: COMPLETED | OUTPATIENT
Start: 2022-03-10 | End: 2022-03-10

## 2022-03-10 RX ORDER — ACETAMINOPHEN 500 MG
1000 TABLET ORAL ONCE
Refills: 0 | Status: COMPLETED | OUTPATIENT
Start: 2022-03-10 | End: 2022-03-10

## 2022-03-10 RX ORDER — NOREPINEPHRINE BITARTRATE/D5W 8 MG/250ML
0.05 PLASTIC BAG, INJECTION (ML) INTRAVENOUS
Qty: 8 | Refills: 0 | Status: DISCONTINUED | OUTPATIENT
Start: 2022-03-10 | End: 2022-03-10

## 2022-03-10 RX ORDER — INSULIN HUMAN 100 [IU]/ML
10 INJECTION, SOLUTION SUBCUTANEOUS ONCE
Refills: 0 | Status: COMPLETED | OUTPATIENT
Start: 2022-03-10 | End: 2022-03-10

## 2022-03-10 RX ORDER — IPRATROPIUM BROMIDE 0.2 MG/ML
1 SOLUTION, NON-ORAL INHALATION EVERY 6 HOURS
Refills: 0 | Status: DISCONTINUED | OUTPATIENT
Start: 2022-03-10 | End: 2022-03-15

## 2022-03-10 RX ORDER — TIOTROPIUM BROMIDE 18 UG/1
1 CAPSULE ORAL; RESPIRATORY (INHALATION) DAILY
Refills: 0 | Status: DISCONTINUED | OUTPATIENT
Start: 2022-03-10 | End: 2022-03-10

## 2022-03-10 RX ORDER — PROPOFOL 10 MG/ML
30 INJECTION, EMULSION INTRAVENOUS
Qty: 1000 | Refills: 0 | Status: DISCONTINUED | OUTPATIENT
Start: 2022-03-10 | End: 2022-03-11

## 2022-03-10 RX ORDER — SODIUM ZIRCONIUM CYCLOSILICATE 10 G/10G
10 POWDER, FOR SUSPENSION ORAL
Refills: 0 | Status: COMPLETED | OUTPATIENT
Start: 2022-03-10 | End: 2022-03-11

## 2022-03-10 RX ORDER — ALBUTEROL 90 UG/1
2 AEROSOL, METERED ORAL EVERY 6 HOURS
Refills: 0 | Status: DISCONTINUED | OUTPATIENT
Start: 2022-03-10 | End: 2022-03-14

## 2022-03-10 RX ORDER — DEXMEDETOMIDINE HYDROCHLORIDE IN 0.9% SODIUM CHLORIDE 4 UG/ML
0.2 INJECTION INTRAVENOUS
Qty: 400 | Refills: 0 | Status: DISCONTINUED | OUTPATIENT
Start: 2022-03-10 | End: 2022-03-12

## 2022-03-10 RX ORDER — NOREPINEPHRINE BITARTRATE/D5W 8 MG/250ML
0.05 PLASTIC BAG, INJECTION (ML) INTRAVENOUS
Qty: 16 | Refills: 0 | Status: DISCONTINUED | OUTPATIENT
Start: 2022-03-10 | End: 2022-03-11

## 2022-03-10 RX ORDER — SODIUM ZIRCONIUM CYCLOSILICATE 10 G/10G
10 POWDER, FOR SUSPENSION ORAL ONCE
Refills: 0 | Status: COMPLETED | OUTPATIENT
Start: 2022-03-10 | End: 2022-03-10

## 2022-03-10 RX ORDER — ETOMIDATE 2 MG/ML
20 INJECTION INTRAVENOUS ONCE
Refills: 0 | Status: COMPLETED | OUTPATIENT
Start: 2022-03-10 | End: 2022-03-10

## 2022-03-10 RX ORDER — FENTANYL CITRATE 50 UG/ML
0.5 INJECTION INTRAVENOUS
Qty: 2500 | Refills: 0 | Status: DISCONTINUED | OUTPATIENT
Start: 2022-03-10 | End: 2022-03-10

## 2022-03-10 RX ORDER — SENNA PLUS 8.6 MG/1
2 TABLET ORAL AT BEDTIME
Refills: 0 | Status: DISCONTINUED | OUTPATIENT
Start: 2022-03-10 | End: 2022-03-13

## 2022-03-10 RX ORDER — SODIUM ZIRCONIUM CYCLOSILICATE 10 G/10G
10 POWDER, FOR SUSPENSION ORAL ONCE
Refills: 0 | Status: DISCONTINUED | OUTPATIENT
Start: 2022-03-10 | End: 2022-03-10

## 2022-03-10 RX ORDER — FENTANYL CITRATE 50 UG/ML
3 INJECTION INTRAVENOUS
Qty: 2500 | Refills: 0 | Status: DISCONTINUED | OUTPATIENT
Start: 2022-03-10 | End: 2022-03-11

## 2022-03-10 RX ORDER — SODIUM CHLORIDE 9 MG/ML
1000 INJECTION, SOLUTION INTRAVENOUS ONCE
Refills: 0 | Status: DISCONTINUED | OUTPATIENT
Start: 2022-03-10 | End: 2022-03-10

## 2022-03-10 RX ORDER — CHLORHEXIDINE GLUCONATE 213 G/1000ML
15 SOLUTION TOPICAL EVERY 12 HOURS
Refills: 0 | Status: DISCONTINUED | OUTPATIENT
Start: 2022-03-10 | End: 2022-03-11

## 2022-03-10 RX ADMIN — CHLORHEXIDINE GLUCONATE 1 APPLICATION(S): 213 SOLUTION TOPICAL at 05:29

## 2022-03-10 RX ADMIN — ALBUTEROL 2 PUFF(S): 90 AEROSOL, METERED ORAL at 10:34

## 2022-03-10 RX ADMIN — PROPOFOL 18.2 MICROGRAM(S)/KG/MIN: 10 INJECTION, EMULSION INTRAVENOUS at 09:20

## 2022-03-10 RX ADMIN — Medication 20 MILLIGRAM(S): at 06:13

## 2022-03-10 RX ADMIN — ALBUTEROL 2 PUFF(S): 90 AEROSOL, METERED ORAL at 14:46

## 2022-03-10 RX ADMIN — Medication 400 MILLIGRAM(S): at 17:42

## 2022-03-10 RX ADMIN — Medication 40 MILLIGRAM(S): at 13:38

## 2022-03-10 RX ADMIN — CHLORHEXIDINE GLUCONATE 15 MILLILITER(S): 213 SOLUTION TOPICAL at 05:29

## 2022-03-10 RX ADMIN — SODIUM ZIRCONIUM CYCLOSILICATE 10 GRAM(S): 10 POWDER, FOR SUSPENSION ORAL at 06:13

## 2022-03-10 RX ADMIN — SODIUM ZIRCONIUM CYCLOSILICATE 10 GRAM(S): 10 POWDER, FOR SUSPENSION ORAL at 17:41

## 2022-03-10 RX ADMIN — ALBUTEROL 2 PUFF(S): 90 AEROSOL, METERED ORAL at 20:20

## 2022-03-10 RX ADMIN — SODIUM CHLORIDE 1000 MILLILITER(S): 9 INJECTION INTRAMUSCULAR; INTRAVENOUS; SUBCUTANEOUS at 07:36

## 2022-03-10 RX ADMIN — SODIUM CHLORIDE 1000 MILLILITER(S): 9 INJECTION INTRAMUSCULAR; INTRAVENOUS; SUBCUTANEOUS at 00:00

## 2022-03-10 RX ADMIN — DEXMEDETOMIDINE HYDROCHLORIDE IN 0.9% SODIUM CHLORIDE 5.06 MICROGRAM(S)/KG/HR: 4 INJECTION INTRAVENOUS at 23:02

## 2022-03-10 RX ADMIN — PROPOFOL 18.2 MICROGRAM(S)/KG/MIN: 10 INJECTION, EMULSION INTRAVENOUS at 14:29

## 2022-03-10 RX ADMIN — PROPOFOL 6.07 MICROGRAM(S)/KG/MIN: 10 INJECTION, EMULSION INTRAVENOUS at 01:03

## 2022-03-10 RX ADMIN — SENNA PLUS 2 TABLET(S): 8.6 TABLET ORAL at 22:19

## 2022-03-10 RX ADMIN — FENTANYL CITRATE 5.06 MICROGRAM(S)/KG/HR: 50 INJECTION INTRAVENOUS at 11:49

## 2022-03-10 RX ADMIN — Medication 40 MILLIGRAM(S): at 22:19

## 2022-03-10 RX ADMIN — ETOMIDATE 20 MILLIGRAM(S): 2 INJECTION INTRAVENOUS at 00:00

## 2022-03-10 RX ADMIN — PROPOFOL 18.2 MICROGRAM(S)/KG/MIN: 10 INJECTION, EMULSION INTRAVENOUS at 11:30

## 2022-03-10 RX ADMIN — SODIUM CHLORIDE 1000 MILLILITER(S): 9 INJECTION INTRAMUSCULAR; INTRAVENOUS; SUBCUTANEOUS at 06:47

## 2022-03-10 RX ADMIN — Medication 1000 MILLIGRAM(S): at 17:55

## 2022-03-10 RX ADMIN — Medication 50 MILLILITER(S): at 06:16

## 2022-03-10 RX ADMIN — DEXMEDETOMIDINE HYDROCHLORIDE IN 0.9% SODIUM CHLORIDE 12.6 MICROGRAM(S)/KG/HR: 4 INJECTION INTRAVENOUS at 20:28

## 2022-03-10 RX ADMIN — PROPOFOL 6.07 MICROGRAM(S)/KG/MIN: 10 INJECTION, EMULSION INTRAVENOUS at 05:28

## 2022-03-10 RX ADMIN — INSULIN HUMAN 10 UNIT(S): 100 INJECTION, SOLUTION SUBCUTANEOUS at 06:13

## 2022-03-10 RX ADMIN — Medication 4.74 MICROGRAM(S)/KG/MIN: at 09:38

## 2022-03-10 RX ADMIN — PANTOPRAZOLE SODIUM 40 MILLIGRAM(S): 20 TABLET, DELAYED RELEASE ORAL at 13:38

## 2022-03-10 RX ADMIN — CEFTRIAXONE 100 MILLIGRAM(S): 500 INJECTION, POWDER, FOR SOLUTION INTRAMUSCULAR; INTRAVENOUS at 05:34

## 2022-03-10 RX ADMIN — CHLORHEXIDINE GLUCONATE 15 MILLILITER(S): 213 SOLUTION TOPICAL at 17:41

## 2022-03-10 RX ADMIN — Medication 9.48 MICROGRAM(S)/KG/MIN: at 01:04

## 2022-03-10 RX ADMIN — Medication 40 MILLIGRAM(S): at 05:28

## 2022-03-10 RX ADMIN — Medication 300 MILLIGRAM(S): at 16:28

## 2022-03-10 RX ADMIN — FENTANYL CITRATE 30.3 MICROGRAM(S)/KG/HR: 50 INJECTION INTRAVENOUS at 23:35

## 2022-03-10 RX ADMIN — ENOXAPARIN SODIUM 40 MILLIGRAM(S): 100 INJECTION SUBCUTANEOUS at 05:28

## 2022-03-10 RX ADMIN — FENTANYL CITRATE 5.06 MICROGRAM(S)/KG/HR: 50 INJECTION INTRAVENOUS at 01:03

## 2022-03-10 NOTE — PROGRESS NOTE ADULT - SUBJECTIVE AND OBJECTIVE BOX
Patient is a 61y old  Male who presents with a chief complaint of COPD exacerbation (10 Mar 2022 17:35)    PATIENT IS SEEN AND EXAMINED IN  ICU .    ORAL GT [  X  ]    AMOR [ X  ]      ET [ X  ]    ALLERGIES:  No Known Allergies      Daily     Daily     VITALS:    Vital Signs Last 24 Hrs  T(C): 38.1 (10 Mar 2022 16:00), Max: 38.1 (10 Mar 2022 16:00)  T(F): 100.5 (10 Mar 2022 16:00), Max: 100.5 (10 Mar 2022 16:00)  HR: 97 (10 Mar 2022 19:00) (75 - 129)  BP: 120/64 (10 Mar 2022 19:00) (77/43 - 144/87)  BP(mean): 81 (10 Mar 2022 19:00) (55 - 105)  RR: 20 (10 Mar 2022 19:00) (9 - 34)  SpO2: 95% (10 Mar 2022 19:00) (90% - 100%)    LABS:    CBC Full  -  ( 10 Mar 2022 04:56 )  WBC Count : 11.31 K/uL  RBC Count : 4.42 M/uL  Hemoglobin : 12.4 g/dL  Hematocrit : 42.4 %  Platelet Count - Automated : 158 K/uL  Mean Cell Volume : 95.9 fl  Mean Cell Hemoglobin : 28.1 pg  Mean Cell Hemoglobin Concentration : 29.2 gm/dL  Auto Neutrophil # : 10.16 K/uL  Auto Lymphocyte # : 0.74 K/uL  Auto Monocyte # : 0.33 K/uL  Auto Eosinophil # : 0.01 K/uL  Auto Basophil # : 0.01 K/uL  Auto Neutrophil % : 89.9 %  Auto Lymphocyte % : 6.5 %  Auto Monocyte % : 2.9 %  Auto Eosinophil % : 0.1 %  Auto Basophil % : 0.1 %    PT/INR - ( 09 Mar 2022 12:42 )   PT: 12.4 sec;   INR: 1.04 ratio         PTT - ( 09 Mar 2022 12:42 )  PTT:32.2 sec  03-10    135  |  91<L>  |  23<H>  ----------------------------<  176<H>  5.1   |  41<H>  |  0.62    Ca    8.6      10 Mar 2022 16:23  Phos  2.6     03-10  Mg     1.9     03-10    TPro  6.4  /  Alb  2.8<L>  /  TBili  0.3  /  DBili  x   /  AST  23  /  ALT  28  /  AlkPhos  67  03-10    CAPILLARY BLOOD GLUCOSE      POCT Blood Glucose.: 182 mg/dL (10 Mar 2022 16:14)  POCT Blood Glucose.: 175 mg/dL (10 Mar 2022 13:19)  POCT Blood Glucose.: 185 mg/dL (10 Mar 2022 08:18)  POCT Blood Glucose.: 156 mg/dL (09 Mar 2022 21:44)    CARDIAC MARKERS ( 09 Mar 2022 21:37 )  x     / x     / 140 U/L / x     / 3.9 ng/mL      LIVER FUNCTIONS - ( 10 Mar 2022 04:56 )  Alb: 2.8 g/dL / Pro: 6.4 g/dL / ALK PHOS: 67 U/L / ALT: 28 U/L DA / AST: 23 U/L / GGT: x           Creatinine Trend: 0.62<--, 0.63<--, 0.53<--, 0.58<--  I&O's Summary    09 Mar 2022 07:01  -  10 Mar 2022 07:00  --------------------------------------------------------  IN: 2323.8 mL / OUT: 3150 mL / NET: -826.2 mL    10 Mar 2022 07:01  -  10 Mar 2022 20:45  --------------------------------------------------------  IN: 1668.8 mL / OUT: 935 mL / NET: 733.8 mL        ABG - ( 10 Mar 2022 17:05 )  pH, Arterial: 7.44  pH, Blood: x     /  pCO2: 71    /  pO2: 64    / HCO3: 48    / Base Excess: 19.8  /  SaO2: 94                      MEDICATIONS:    MEDICATIONS  (STANDING):  ALBUTerol    90 MICROgram(s) HFA Inhaler 2 Puff(s) Inhalation every 6 hours  aspirin Suppository 300 milliGRAM(s) Rectal daily  chlorhexidine 0.12% Liquid 15 milliLiter(s) Oral Mucosa every 12 hours  chlorhexidine 2% Cloths 1 Application(s) Topical <User Schedule>  dexMEDEtomidine Infusion 0.5 MICROgram(s)/kG/Hr (12.6 mL/Hr) IV Continuous <Continuous>  enoxaparin Injectable 40 milliGRAM(s) SubCutaneous every 24 hours  fentaNYL   Infusion. 3 MICROgram(s)/kG/Hr (30.3 mL/Hr) IV Continuous <Continuous>  insulin lispro (ADMELOG) corrective regimen sliding scale   SubCutaneous every 6 hours  ipratropium 17 MICROgram(s) HFA Inhaler 1 Puff(s) Inhalation every 6 hours  methylPREDNISolone sodium succinate Injectable 40 milliGRAM(s) IV Push every 8 hours  norepinephrine Infusion 0.05 MICROgram(s)/kG/Min (4.74 mL/Hr) IV Continuous <Continuous>  pantoprazole  Injectable 40 milliGRAM(s) IV Push daily  propofol Infusion 30 MICROgram(s)/kG/Min (18.2 mL/Hr) IV Continuous <Continuous>  senna 2 Tablet(s) Oral at bedtime  sodium zirconium cyclosilicate 10 Gram(s) Oral two times a day      MEDICATIONS  (PRN):        REVIEW OF SYSTEMS:                           ALL ROS DONE [ X   ]      CONSTITUTIONAL:  LETHARGIC [   ], FEVER [   ], UNRESPONSIVE [   ]  CVS:  CP  [   ], SOB, [   ], PALPITATIONS [   ], DIZZYNESS [   ]  RS: COUGH [   ], SPUTUM [   ]  GI: ABDOMINAL PAIN [   ], NAUSEA [   ], VOMITINGS [   ], DIARRHEA [   ], CONSTIPATION [   ]  :  DYSURIA [   ], NOCTURIA [   ], INCREASED FREQUENCY [   ], DRIBLING [   ],  SKELETAL: PAINFUL JOINTS [   ], SWOLLEN JOINTS [   ], NECK ACHE [   ], LOW BACK ACHE [   ],  SKIN : ULCERS [   ], RASH [   ], ITCHING [   ]  CNS: HEAD ACHE [   ], DOUBLE VISION [   ], BLURRED VISION [   ], AMS / CONFUSION [   ], SEIZURES [   ], WEAKNESS [   ],TINGLING / NUMBNESS [   ]      PHYSICAL EXAMINATION:    GENERAL APPEARANCE: NO DISTRESS  HEENT:  NO PALLOR, NO  JVD,  NO   NODES, NECK SUPPLE    ORAL ET + , ORAL GT +  CVS: S1 +, S2 +,   RS: AEEB,  OCCASIONAL  RALES +,   NO RONCHI  ABD: SOFT, NT, NO, BS +                                                           AMOR +  EXT: PE ++  SKIN: WARM,   SKELETAL:  ROM REDUCED AT CERVICAL & LS SPINE   CNS:  AAO X 2   , NO  DEFICITS        RADIOLOGY :    < from: Xray Chest 1 View-PORTABLE IMMEDIATE (Xray Chest 1 View-PORTABLE IMMEDIATE .) (03.10.22 @ 17:11) >    IMPRESSION: Improved right base infiltrate. COPD NG tube. No bowel   obstruction.    < end of copied text >        ASSESSMENT :     Shortness of breath    COPD (chronic obstructive pulmonary disease)    Stented coronary artery    HLD (hyperlipidemia)    Pulmonary HTN    Type 2 diabetes mellitus without complication, with long-term current use of insulin    Coronary artery disease involving native coronary artery of native heart without angina pectoris    Bipolar 1 disorder    Smoker    Gastroesophageal reflux disease, esophagitis presence not specified    Oxygen dependent    COPD (chronic obstructive pulmonary disease)    DM (diabetes mellitus)    CAD (coronary artery disease)    GERD (gastroesophageal reflux disease)    HLD (hyperlipidemia)    Insomnia    Polyarthritis    CHF (congestive heart failure)    HTN (hypertension)    Mood disorder          PLAN:  HPI:  60 yo M, from NH, active smoker PMHx of CAD, ?CHF, COPD, peripheral neuropathy, GERD, HTN, HLD, obesity, polyarthritis, Pulmonary HTN, TIA, Vit D Def came with chief complain of shortness of breadth . As per ED triage pt BIAPAP machine was malfunctioning in NH. He used BIPAP at night in NH. Pt is a mild historian and unable to provide detailed hx due to BIPAP and lethargic in the setting of CO2 retention. On assessment he is lethargic but arousable and follows simple commands. Pt stated that he came to the hospital because he is having shortness of breadth for one month. He denied any chest pain, N/V/D.     Of note Pt non complaint to his BIPAP use . In Aug 2021 he was intubated for COPD exacerbation. In Nov 2021 again admitted for COPD exacerbation.    ED course: Pt has increase work of breathing. ABG shows CO2 125, PH 7.18 obtained on 4lit NC . After using bipap repeat blood gas on 3 lit NC shows Ph 7.3 , CO2 >125.   (09 Mar 2022 16:38)      - COPD EXACERBATION, HYPOXIC & HYPERCAPNOEIC RESPIRATORY FAILURE - STARTED ON IV METHYLPREDNISOLONE - S/P INTUBATION, VENT SUPPORT IS IN PROGRESS, ICU F/UP IS IN PROGRESS    - GI AND DVT PROPHYLAXIS    - DR. REFUGIO CABELLO

## 2022-03-10 NOTE — PROGRESS NOTE ADULT - ASSESSMENT
Patient is a 61 year old male, from NH, active smoker PMHx of CAD, ?CHF, COPD, peripheral neuropathy, GERD, HTN, HLD, obesity, polyarthritis, Pulmonary HTN, TIA, Vit D Def came with chief complain of shortness of breath.   RRT called in ED due to worsening SOB and hypoxia. Patient placed on BiPAP and given solumedrol 125mg. Patient to be transferred to ICU for closer monitoring.     Assessment:  - Acute hypoxic hypercapnic respiratory failure   - COPD exacerbation   - CHF   - Respiratory acidosis   - HTN  - HLD  - CAD  - GERD  - Pulmonary HTN   - Obesity   - Mood disorder   - DM   - Hyperkalemia     Plan:  Neuro:  - no acute issues     Cardiovascular:  #CHF   - patient with history of CHF  - last ECHO showed normal EF last year   - will give lasix 20mg IV for now   - repeat ECHO   - fluid restrictions  - strict I/O  - daily weight     #HTN  - monitor BP and resume meds as needed    #HLD   - continue with statin when able to take PO meds     #CAD  - continue with aspirin when able to take PO meds     Pulmonary:   #Acute hypoxic hypercapnic respiratory failure likely due to COPD exacerbation   - patient presented due to SOB   - likely due to noncompliance of CPAP machine   - patient noted to be tachypneic in ED and placed on BiPAP  - given solumedrol 125mg in ED  - will continue with solumedrol 40mg q8 for now  - will give CTX and zithro   - continue BiPAP for now   - monitor O2 sat   - repeat ABG in AM   - repeat CXR in AM   - albuterol and symbicort     #Respiratory acidosis   - noted to have CO2 more 125 on ABG   - patient appears to be chronic retainer normally with CO2 in 40s   - placed on BiPAP with improvement in pH  - continue with BiPAP for now  - repeat ABG in AM     Infectious Diseases:  - no acute issues   - ctx and zithro for COPD exacerbation     Gastrointestinal:  - will keep NPO for now while on BiPAP  - continue with PPI     Renal:  #Hyperkalemia  - noted to have potassium of 5.6  - D50 and insulin and kayexelate ordered to be given   - monitor BMP daily     #Hyponatremia  - noted to have sodium of 133 on admission   - monitor BMP for now     Heme/onc:   - no acute issues     Endo:   #DM  - monitor BS q6 while patient is NPO  - f/u A1c  - monitor BS and adjust insulin as needed     Skin/ catheter:   - no acute issues     Prophylaxis:   - lovenox for DVT prophylaxis  - PPI for GI prophylaxis     Goals of Care:   - Full Code     Dispo: Transfer to ICU  Patient is a 61 year old male, from NH, active smoker PMHx of CAD, ?CHF, COPD, peripheral neuropathy, GERD, HTN, HLD, obesity, polyarthritis, Pulmonary HTN, TIA, Vit D Def came with chief complain of shortness of breath.   RRT called in ED due to worsening SOB and hypoxia. Patient placed on BiPAP and given solumedrol 125mg. Patient to be transferred to ICU for closer monitoring.     Assessment:  - Acute hypoxic hypercapnic respiratory failure   - COPD exacerbation   - CHF   - Respiratory acidosis   - HTN  - HLD  - CAD  - GERD  - Pulmonary HTN   - Obesity   - Mood disorder   - DM   - Hyperkalemia     Plan:  Neuro:  - no acute issues   Hyperactivity and agitation requiring intubation for airway protection  Now on propofol 30mg and still mildly agitated- can increase as needed for light sedation  Also on fentanyl 2mc  Will add Precedex or zyprexa closer to extubation    Cardiovascular:  #CHF   - patient with history of CHF  - last ECHO showed normal EF last year   - given 20mg IV lasix on admission  - repeat ECHO pending  - fluid restrictions  - strict I/O show OP of 3L  - daily weight     #HTN  - monitor BP and resume meds as needed  - for now on levo at 0.05mcg due to propofol    #HLD   - continue with statin when able to take PO meds     #CAD  - continue with aspirin when able to take PO meds     Pulmonary:   #Acute hypoxic hypercapnic respiratory failure likely due to COPD exacerbation   - patient presented due to SOB   - likely due to noncompliance of NIV machine  - patient noted to be tachypneic in ED and placed on BiPAP  - However intubated due to agitation    #COPD  - given solumedrol 125mg in ED  - will continue with solumedrol 40mg q8 for now  - was on Abx for CAP however patient is from NH, and has low suspicion for PNA at this time, will dc Ceftriaxone and Azithro    - monitor O2 sat   - repeat ABG in AM   - repeat CXR in AM   - albuterol and symbicort     #Respiratory acidosis   - noted to have CO2 125 now 80s improving baseline likely 60s  - patient appears to be chronic retainer  - placed on BiPAP with improvement in pH  - However intubated due to agitation  -monitor ABGs      Infectious Diseases:  - no acute issues   - s/p ctx and zithro for COPD exacerbation   - Patient has low s/o infection     Gastrointestinal:  - Can start TF tomorrow  - continue with PPI     Renal:  #Hyperkalemia  - noted to have potassium of 5.6  -s/p D50 and insulin and Kayexalate   -Lokelma 10mg TID for now  - monitor BMP in the PM and reassess    #Hyponatremia  - noted to have sodium of 133 on admission   -Likely euvolemic, hypotonic hyponatremia  -likely Due to SIADH with psych meds vs COPD( lung disease)  -Will f/u Urine studies (Urine Osm, Urine sodium)  - monitor BMP for now     Heme/onc:   - no acute issues   -Lovenox for PPX    Endo:   #DM  - monitor BS q6 while patient is NPO  - f/u A1c  - monitor BS and adjust insulin as needed     Skin/ catheter:   - no acute issues     Prophylaxis:   - lovenox for DVT prophylaxis  - PPI for GI prophylaxis     Goals of Care:   - Full Code     Dispo: ICU

## 2022-03-10 NOTE — DIETITIAN INITIAL EVALUATION ADULT. - PERTINENT LABORATORY DATA
03-10 Na135 mmol/L Glu 176 mg/dL<H> K+ 5.1 mmol/L Cr  0.62 mg/dL BUN 23 mg/dL<H>   03-10 Phos 2.6 mg/dL   03-10 Alb 2.8 g/dL<L>        03-10-22 @ 10:18 HgbA1C 7.4

## 2022-03-10 NOTE — DIETITIAN INITIAL EVALUATION ADULT. - PROBLEM SELECTOR PLAN 3
-K 5.8  -Will give 5units of regular insulin with D50 and Kayexalate  enema   -BMP q12hr   -EKG without any new ischemic changes   -Trop neg , ProBNP 900  -Avoid nephrotoxic agents

## 2022-03-10 NOTE — DIETITIAN INITIAL EVALUATION ADULT. - PERTINENT MEDS FT
MEDICATIONS  (STANDING):  acetaminophen   IVPB .. 1000 milliGRAM(s) IV Intermittent once  ALBUTerol    90 MICROgram(s) HFA Inhaler 2 Puff(s) Inhalation every 6 hours  aspirin Suppository 300 milliGRAM(s) Rectal daily  chlorhexidine 0.12% Liquid 15 milliLiter(s) Oral Mucosa every 12 hours  chlorhexidine 2% Cloths 1 Application(s) Topical <User Schedule>  enoxaparin Injectable 40 milliGRAM(s) SubCutaneous every 24 hours  fentaNYL   Infusion. 0.5 MICROgram(s)/kG/Hr (5.06 mL/Hr) IV Continuous <Continuous>  insulin lispro (ADMELOG) corrective regimen sliding scale   SubCutaneous every 6 hours  ipratropium 17 MICROgram(s) HFA Inhaler 1 Puff(s) Inhalation every 6 hours  methylPREDNISolone sodium succinate Injectable 40 milliGRAM(s) IV Push every 8 hours  norepinephrine Infusion 0.05 MICROgram(s)/kG/Min (4.74 mL/Hr) IV Continuous <Continuous>  pantoprazole  Injectable 40 milliGRAM(s) IV Push daily  propofol Infusion 30 MICROgram(s)/kG/Min (18.2 mL/Hr) IV Continuous <Continuous>  senna 2 Tablet(s) Oral at bedtime  sodium zirconium cyclosilicate 10 Gram(s) Oral two times a day    MEDICATIONS  (PRN):

## 2022-03-10 NOTE — PROGRESS NOTE ADULT - SUBJECTIVE AND OBJECTIVE BOX
INTERVAL HPI/OVERNIGHT EVENTS:    no events    PRESSORS: [X ] YES Levophed 0.05      Antimicrobial:    Cardiovascular:  norepinephrine Infusion 0.05 MICROgram(s)/kG/Min IV Continuous <Continuous>    Pulmonary:  ALBUTerol    90 MICROgram(s) HFA Inhaler 2 Puff(s) Inhalation every 6 hours  tiotropium 18 MICROgram(s) Capsule 1 Capsule(s) Inhalation daily    Hematalogic:  enoxaparin Injectable 40 milliGRAM(s) SubCutaneous every 24 hours    Other:  aspirin Suppository 300 milliGRAM(s) Rectal daily  chlorhexidine 0.12% Liquid 15 milliLiter(s) Oral Mucosa every 12 hours  chlorhexidine 2% Cloths 1 Application(s) Topical <User Schedule>  fentaNYL   Infusion. 0.5 MICROgram(s)/kG/Hr IV Continuous <Continuous>  insulin lispro (ADMELOG) corrective regimen sliding scale   SubCutaneous every 6 hours  methylPREDNISolone sodium succinate Injectable 40 milliGRAM(s) IV Push every 8 hours  pantoprazole  Injectable 40 milliGRAM(s) IV Push daily  propofol Infusion 30 MICROgram(s)/kG/Min IV Continuous <Continuous>  senna 2 Tablet(s) Oral at bedtime  sodium chloride 0.9% Bolus 1000 milliLiter(s) IV Bolus once      Drug Dosing Weight  Height (cm): 172.7 (09 Mar 2022 10:55)  Weight (kg): 101.1 (09 Mar 2022 22:00)  BMI (kg/m2): 33.9 (09 Mar 2022 22:00)  BSA (m2): 2.14 (09 Mar 2022 22:00)    CENTRAL LINE:  [X ] NO       AMOR: [ X] YES    DATE INSERTED: 3/9/2022      A-LINE:  [X ] NO     PMH/Social Hx/Fam Hx -reviewed admission note, no change since admission  PAST MEDICAL & SURGICAL HISTORY:  COPD (chronic obstructive pulmonary disease)  Oxygen 2-3L at home    Stented coronary artery  2017    HLD (hyperlipidemia)    Pulmonary HTN    Type 2 diabetes mellitus without complication, with long-term current use of insulin    Coronary artery disease involving native coronary artery of native heart without angina pectoris    Smoker    Gastroesophageal reflux disease, esophagitis presence not specified    Oxygen dependent    DM (diabetes mellitus)    CAD (coronary artery disease)    GERD (gastroesophageal reflux disease)    Insomnia    CHF (congestive heart failure)    HTN (hypertension)    Mood disorder    No significant past surgical history      Heart faliure: acute [ ] chronic [ ] acute or chronic [ ] diastolic [ ] systolic [ ] combied systolic and diastolic[ ]  RADHIKA: ATN[ ] renal medullary necrosis [ ] CKD I [ ]CKDII [ ]CKD III [ ]CKD IV [ ]CKD V [ ]Other pathological lesions [ ]  Abdominal Nutrition Status: malnutrition [ ] cachexia [ ] morbid obesity/BMI=40 [ ] Supplement ordered [___________]     T(C): 36.1 (03-10-22 @ 04:00), Max: 37.2 (03-09-22 @ 15:38)  HR: 94 (03-10-22 @ 08:15)  BP: 95/67 (03-10-22 @ 06:00)  BP(mean): 76 (03-10-22 @ 06:00)  ABP: --  ABP(mean): --  RR: 12 (03-10-22 @ 06:00)  SpO2: 98% (03-10-22 @ 08:15)  Wt(kg): --    ABG - ( 10 Mar 2022 05:25 )  pH, Arterial: 7.38  pH, Blood: x     /  pCO2: 89    /  pO2: 75    / HCO3: 53    / Base Excess: 22.1  /  SaO2: 97                    03-09 @ 07:01  -  03-10 @ 07:00  --------------------------------------------------------  IN: 1276 mL / OUT: 3000 mL / NET: -1724 mL        Mode: AC/ CMV (Assist Control/ Continuous Mandatory Ventilation)  RR (machine): 14  TV (machine): 450  FiO2: 50  PEEP: 5  ITime: 0.8  MAP: 10  PIP: 29      PHYSICAL EXAM:  GENERAL: NAD, lying in bed comfortably, wakes up intermittently sedated  HEAD:  Atraumatic, Normocephalic  ENT: Moist mucous membranes  NECK: Supple, No JVD  CHEST/LUNG: Clear to auscultation bilaterally; No rales, rhonchi, + expiratory  wheezing, no  rubs. Unlabored respirations  HEART: Regular rate and rhythm; No murmurs, rubs, or gallops  ABDOMEN: Bowel sounds present; Soft, Nontender, + distended. No hepatomegally  EXTREMITIES:  2+ Peripheral Pulses, brisk capillary refill. No clubbing, cyanosis, or edema  NERVOUS SYSTEM:  Alert & Oriented X3, speech clear. No deficits   MSK: FROM all 4 extremities, full and equal strength  SKIN: No rashes or lesions      LABS:  CBC Full  -  ( 10 Mar 2022 04:56 )  WBC Count : 11.31 K/uL  RBC Count : 4.42 M/uL  Hemoglobin : 12.4 g/dL  Hematocrit : 42.4 %  Platelet Count - Automated : 158 K/uL  Mean Cell Volume : 95.9 fl  Mean Cell Hemoglobin : 28.1 pg  Mean Cell Hemoglobin Concentration : 29.2 gm/dL  Auto Neutrophil # : 10.16 K/uL  Auto Lymphocyte # : 0.74 K/uL  Auto Monocyte # : 0.33 K/uL  Auto Eosinophil # : 0.01 K/uL  Auto Basophil # : 0.01 K/uL  Auto Neutrophil % : 89.9 %  Auto Lymphocyte % : 6.5 %  Auto Monocyte % : 2.9 %  Auto Eosinophil % : 0.1 %  Auto Basophil % : 0.1 %    03-10    132<L>  |  89<L>  |  25<H>  ----------------------------<  154<H>  5.5<H>   |  44<H>  |  0.63    Ca    8.4      10 Mar 2022 04:56  Phos  2.6     03-10  Mg     1.9     03-10    TPro  6.4  /  Alb  2.8<L>  /  TBili  0.3  /  DBili  x   /  AST  23  /  ALT  28  /  AlkPhos  67  03-10    PT/INR - ( 09 Mar 2022 12:42 )   PT: 12.4 sec;   INR: 1.04 ratio         PTT - ( 09 Mar 2022 12:42 )  PTT:32.2 sec        RADIOLOGY & ADDITIONAL STUDIES REVIEWED:      [ X]GOALS OF CARE DISCUSSION WITH PATIENT/FAMILY/PROXY:    CRITICAL CARE TIME SPENT: 35 minutes INTERVAL HPI/OVERNIGHT EVENTS:    no events    PRESSORS: [X ] YES Levophed 0.05      Antimicrobial:    Cardiovascular:  norepinephrine Infusion 0.05 MICROgram(s)/kG/Min IV Continuous <Continuous>    Pulmonary:  ALBUTerol    90 MICROgram(s) HFA Inhaler 2 Puff(s) Inhalation every 6 hours  tiotropium 18 MICROgram(s) Capsule 1 Capsule(s) Inhalation daily    Hematalogic:  enoxaparin Injectable 40 milliGRAM(s) SubCutaneous every 24 hours    Other:  aspirin Suppository 300 milliGRAM(s) Rectal daily  chlorhexidine 0.12% Liquid 15 milliLiter(s) Oral Mucosa every 12 hours  chlorhexidine 2% Cloths 1 Application(s) Topical <User Schedule>  fentaNYL   Infusion. 0.5 MICROgram(s)/kG/Hr IV Continuous <Continuous>  insulin lispro (ADMELOG) corrective regimen sliding scale   SubCutaneous every 6 hours  methylPREDNISolone sodium succinate Injectable 40 milliGRAM(s) IV Push every 8 hours  pantoprazole  Injectable 40 milliGRAM(s) IV Push daily  propofol Infusion 30 MICROgram(s)/kG/Min IV Continuous <Continuous>  senna 2 Tablet(s) Oral at bedtime  sodium chloride 0.9% Bolus 1000 milliLiter(s) IV Bolus once      Drug Dosing Weight  Height (cm): 172.7 (09 Mar 2022 10:55)  Weight (kg): 101.1 (09 Mar 2022 22:00)  BMI (kg/m2): 33.9 (09 Mar 2022 22:00)  BSA (m2): 2.14 (09 Mar 2022 22:00)    CENTRAL LINE:  [X ] NO       AMOR: [ X] YES    DATE INSERTED: 3/9/2022      A-LINE:  [X ] NO     PMH/Social Hx/Fam Hx -reviewed admission note, no change since admission  PAST MEDICAL & SURGICAL HISTORY:  COPD (chronic obstructive pulmonary disease)  Oxygen 2-3L at home    Stented coronary artery  2017    HLD (hyperlipidemia)    Pulmonary HTN    Type 2 diabetes mellitus without complication, with long-term current use of insulin    Coronary artery disease involving native coronary artery of native heart without angina pectoris    Smoker    Gastroesophageal reflux disease, esophagitis presence not specified    Oxygen dependent    DM (diabetes mellitus)    CAD (coronary artery disease)    GERD (gastroesophageal reflux disease)    Insomnia    CHF (congestive heart failure)    HTN (hypertension)    Mood disorder    No significant past surgical history      Heart faliure: acute [ ] chronic [ ] acute or chronic [ ] diastolic [ ] systolic [ ] combied systolic and diastolic[ ]  RADHIKA: ATN[ ] renal medullary necrosis [ ] CKD I [ ]CKDII [ ]CKD III [ ]CKD IV [ ]CKD V [ ]Other pathological lesions [ ]  Abdominal Nutrition Status: malnutrition [ ] cachexia [ ] morbid obesity/BMI=40 [ ] Supplement ordered [___________]     T(C): 36.1 (03-10-22 @ 04:00), Max: 37.2 (03-09-22 @ 15:38)  HR: 94 (03-10-22 @ 08:15)  BP: 95/67 (03-10-22 @ 06:00)  BP(mean): 76 (03-10-22 @ 06:00)  ABP: --  ABP(mean): --  RR: 12 (03-10-22 @ 06:00)  SpO2: 98% (03-10-22 @ 08:15)  Wt(kg): --    ABG - ( 10 Mar 2022 05:25 )  pH, Arterial: 7.38  pH, Blood: x     /  pCO2: 89    /  pO2: 75    / HCO3: 53    / Base Excess: 22.1  /  SaO2: 97                    03-09 @ 07:01  -  03-10 @ 07:00  --------------------------------------------------------  IN: 1276 mL / OUT: 3000 mL / NET: -1724 mL        Mode: AC/ CMV (Assist Control/ Continuous Mandatory Ventilation)  RR (machine): 14  TV (machine): 450  FiO2: 50  PEEP: 5  ITime: 0.8  MAP: 10  PIP: 29      PHYSICAL EXAM:  GENERAL: NAD, lying in bed comfortably, wakes up intermittently sedated + ETT oral intubation   HEAD:  Atraumatic, Normocephalic  ENT: Moist mucous membranes  NECK: Supple, No JVD  CHEST/LUNG: Clear to auscultation bilaterally; No rales, rhonchi, + expiratory  wheezing, no  rubs. Unlabored respirations  HEART: Regular rate and rhythm; No murmurs, rubs, or gallops  ABDOMEN: Bowel sounds present; Soft, Nontender, + distended. No hepatomegally  EXTREMITIES:  2+ Peripheral Pulses, brisk capillary refill. No clubbing, cyanosis, or edema  NERVOUS SYSTEM:  sedated   MSK: FROM all 4 extremities, full and equal strength  SKIN: No rashes or lesions      LABS:  CBC Full  -  ( 10 Mar 2022 04:56 )  WBC Count : 11.31 K/uL  RBC Count : 4.42 M/uL  Hemoglobin : 12.4 g/dL  Hematocrit : 42.4 %  Platelet Count - Automated : 158 K/uL  Mean Cell Volume : 95.9 fl  Mean Cell Hemoglobin : 28.1 pg  Mean Cell Hemoglobin Concentration : 29.2 gm/dL  Auto Neutrophil # : 10.16 K/uL  Auto Lymphocyte # : 0.74 K/uL  Auto Monocyte # : 0.33 K/uL  Auto Eosinophil # : 0.01 K/uL  Auto Basophil # : 0.01 K/uL  Auto Neutrophil % : 89.9 %  Auto Lymphocyte % : 6.5 %  Auto Monocyte % : 2.9 %  Auto Eosinophil % : 0.1 %  Auto Basophil % : 0.1 %    03-10    132<L>  |  89<L>  |  25<H>  ----------------------------<  154<H>  5.5<H>   |  44<H>  |  0.63    Ca    8.4      10 Mar 2022 04:56  Phos  2.6     03-10  Mg     1.9     03-10    TPro  6.4  /  Alb  2.8<L>  /  TBili  0.3  /  DBili  x   /  AST  23  /  ALT  28  /  AlkPhos  67  03-10    PT/INR - ( 09 Mar 2022 12:42 )   PT: 12.4 sec;   INR: 1.04 ratio         PTT - ( 09 Mar 2022 12:42 )  PTT:32.2 sec        RADIOLOGY & ADDITIONAL STUDIES REVIEWED:      CXR: refer to EMR . unable to import interpretation   [ X]GOALS OF CARE DISCUSSION WITH PATIENT/FAMILY/PROXY:    CRITICAL CARE TIME SPENT: 35 minutes

## 2022-03-10 NOTE — CHART NOTE - NSCHARTNOTEFT_GEN_A_CORE
tried to reach out to hcp after pt intubation but phone no didn't go through  noemy corrales [phone]  will try again in am

## 2022-03-10 NOTE — DIETITIAN INITIAL EVALUATION ADULT. - PROBLEM SELECTOR PLAN 4
- in July 2021   -Not on STATIN  -NPO due to BIPAP and somnolence in the setting of CO2 retention   -f/u lipid profile

## 2022-03-10 NOTE — PATIENT PROFILE ADULT - FALL HARM RISK - HARM RISK INTERVENTIONS

## 2022-03-10 NOTE — DIETITIAN INITIAL EVALUATION ADULT. - PROBLEM SELECTOR PLAN 2
-ECHO in July 2021 shows EF >55% with enlarge RV but normal pressure   -Chest X-ray with b/l infiltrates with ?vascular congestion (f/u official read)  -Pt is on Lasix 20mg OD   -ProBNP on admission 900, trop neg, no new ischemic changes on EKG   -Will start on IV Lasix 20mg OD with holding parameters

## 2022-03-10 NOTE — DIETITIAN INITIAL EVALUATION ADULT. - PROBLEM SELECTOR PLAN 1
-c/w sob, ?malfunction BIPAP in [facility], ? compliance issue   -Chest X-ray with b/l infiltrates with ?vascular congestion (f/u official read)  -ABG shows CO2 125, PH 7.18 obtained on 4lit NC . After using bipap repeat blood gas on 3 lit NC shows Ph 7.3 , CO2 >125  -On BIPAP currently , continue with BIPAP   -Will repeat blood gas overnight   -Will give one dose of IV Solumedrol 125mg   -Will start on Duoneb q6hr   -Continue with Symbicort  -Solumedrol 40mg IV BID   -Ceftriaxone and Azithromycin IV abx   -Will consult Pulm Dr. Marquez (MAR will consult)  -f/u Procalcitonin  -LOW THRESHOLD FOR ICU  -COVID neg   -NPO for now

## 2022-03-10 NOTE — DIETITIAN INITIAL EVALUATION ADULT. - ADD RECOMMEND
With Propofol @12.1 ml/ hr: Nepro 35x24 + 1 Pt Prosource BID (840 ml Nepro, 1512 kcals, 68 gm protein) 2 Prosource add 30 gm protein, 120 kcals. MD to monitor. RD available.

## 2022-03-11 LAB
ALBUMIN SERPL ELPH-MCNC: 2.8 G/DL — LOW (ref 3.5–5)
ALP SERPL-CCNC: 58 U/L — SIGNIFICANT CHANGE UP (ref 40–120)
ALT FLD-CCNC: 22 U/L DA — SIGNIFICANT CHANGE UP (ref 10–60)
ANION GAP SERPL CALC-SCNC: 1 MMOL/L — LOW (ref 5–17)
AST SERPL-CCNC: 13 U/L — SIGNIFICANT CHANGE UP (ref 10–40)
BASE EXCESS BLDA CALC-SCNC: 18.8 MMOL/L — HIGH (ref -2–3)
BASE EXCESS BLDA CALC-SCNC: 19.9 MMOL/L — HIGH (ref -2–3)
BASOPHILS # BLD AUTO: 0.01 K/UL — SIGNIFICANT CHANGE UP (ref 0–0.2)
BASOPHILS NFR BLD AUTO: 0.1 % — SIGNIFICANT CHANGE UP (ref 0–2)
BILIRUB SERPL-MCNC: 0.3 MG/DL — SIGNIFICANT CHANGE UP (ref 0.2–1.2)
BLOOD GAS COMMENTS ARTERIAL: SIGNIFICANT CHANGE UP
BLOOD GAS COMMENTS ARTERIAL: SIGNIFICANT CHANGE UP
BUN SERPL-MCNC: 27 MG/DL — HIGH (ref 7–18)
CALCIUM SERPL-MCNC: 8.5 MG/DL — SIGNIFICANT CHANGE UP (ref 8.4–10.5)
CHLORIDE SERPL-SCNC: 93 MMOL/L — LOW (ref 96–108)
CO2 SERPL-SCNC: 41 MMOL/L — HIGH (ref 22–31)
CREAT SERPL-MCNC: 0.7 MG/DL — SIGNIFICANT CHANGE UP (ref 0.5–1.3)
EGFR: 106 ML/MIN/1.73M2 — SIGNIFICANT CHANGE UP
EOSINOPHIL # BLD AUTO: 0 K/UL — SIGNIFICANT CHANGE UP (ref 0–0.5)
EOSINOPHIL NFR BLD AUTO: 0 % — SIGNIFICANT CHANGE UP (ref 0–6)
GLUCOSE SERPL-MCNC: 225 MG/DL — HIGH (ref 70–99)
HCO3 BLDA-SCNC: 47 MMOL/L — CRITICAL HIGH (ref 21–28)
HCO3 BLDA-SCNC: 48 MMOL/L — CRITICAL HIGH (ref 21–28)
HCT VFR BLD CALC: 39.4 % — SIGNIFICANT CHANGE UP (ref 39–50)
HGB BLD-MCNC: 12.1 G/DL — LOW (ref 13–17)
HOROWITZ INDEX BLDA+IHG-RTO: 40 — SIGNIFICANT CHANGE UP
HOROWITZ INDEX BLDA+IHG-RTO: 80 — SIGNIFICANT CHANGE UP
IMM GRANULOCYTES NFR BLD AUTO: 0.5 % — SIGNIFICANT CHANGE UP (ref 0–1.5)
LYMPHOCYTES # BLD AUTO: 1 K/UL — SIGNIFICANT CHANGE UP (ref 1–3.3)
LYMPHOCYTES # BLD AUTO: 9.7 % — LOW (ref 13–44)
MAGNESIUM SERPL-MCNC: 1.9 MG/DL — SIGNIFICANT CHANGE UP (ref 1.6–2.6)
MCHC RBC-ENTMCNC: 28.5 PG — SIGNIFICANT CHANGE UP (ref 27–34)
MCHC RBC-ENTMCNC: 30.7 GM/DL — LOW (ref 32–36)
MCV RBC AUTO: 92.7 FL — SIGNIFICANT CHANGE UP (ref 80–100)
MONOCYTES # BLD AUTO: 0.56 K/UL — SIGNIFICANT CHANGE UP (ref 0–0.9)
MONOCYTES NFR BLD AUTO: 5.5 % — SIGNIFICANT CHANGE UP (ref 2–14)
NEUTROPHILS # BLD AUTO: 8.65 K/UL — HIGH (ref 1.8–7.4)
NEUTROPHILS NFR BLD AUTO: 84.2 % — HIGH (ref 43–77)
NRBC # BLD: 0 /100 WBCS — SIGNIFICANT CHANGE UP (ref 0–0)
PCO2 BLDA: 69 MMHG — HIGH (ref 35–48)
PCO2 BLDA: 69 MMHG — HIGH (ref 35–48)
PH BLDA: 7.44 — SIGNIFICANT CHANGE UP (ref 7.35–7.45)
PH BLDA: 7.45 — SIGNIFICANT CHANGE UP (ref 7.35–7.45)
PHOSPHATE SERPL-MCNC: 3 MG/DL — SIGNIFICANT CHANGE UP (ref 2.5–4.5)
PLATELET # BLD AUTO: 168 K/UL — SIGNIFICANT CHANGE UP (ref 150–400)
PO2 BLDA: 67 MMHG — LOW (ref 83–108)
PO2 BLDA: 74 MMHG — LOW (ref 83–108)
POTASSIUM SERPL-MCNC: 4.8 MMOL/L — SIGNIFICANT CHANGE UP (ref 3.5–5.3)
POTASSIUM SERPL-SCNC: 4.8 MMOL/L — SIGNIFICANT CHANGE UP (ref 3.5–5.3)
PROT SERPL-MCNC: 6 G/DL — SIGNIFICANT CHANGE UP (ref 6–8.3)
RBC # BLD: 4.25 M/UL — SIGNIFICANT CHANGE UP (ref 4.2–5.8)
RBC # FLD: 13.3 % — SIGNIFICANT CHANGE UP (ref 10.3–14.5)
SAO2 % BLDA: 94 % — SIGNIFICANT CHANGE UP
SAO2 % BLDA: 96 % — SIGNIFICANT CHANGE UP
SODIUM SERPL-SCNC: 135 MMOL/L — SIGNIFICANT CHANGE UP (ref 135–145)
WBC # BLD: 10.27 K/UL — SIGNIFICANT CHANGE UP (ref 3.8–10.5)
WBC # FLD AUTO: 10.27 K/UL — SIGNIFICANT CHANGE UP (ref 3.8–10.5)

## 2022-03-11 PROCEDURE — 71045 X-RAY EXAM CHEST 1 VIEW: CPT | Mod: 26

## 2022-03-11 RX ORDER — INSULIN LISPRO 100/ML
VIAL (ML) SUBCUTANEOUS
Refills: 0 | Status: DISCONTINUED | OUTPATIENT
Start: 2022-03-11 | End: 2022-03-11

## 2022-03-11 RX ORDER — TRAZODONE HCL 50 MG
150 TABLET ORAL ONCE
Refills: 0 | Status: COMPLETED | OUTPATIENT
Start: 2022-03-11 | End: 2022-03-11

## 2022-03-11 RX ORDER — INSULIN LISPRO 100/ML
VIAL (ML) SUBCUTANEOUS
Refills: 0 | Status: DISCONTINUED | OUTPATIENT
Start: 2022-03-11 | End: 2022-03-13

## 2022-03-11 RX ORDER — BENZOCAINE AND MENTHOL 5; 1 G/100ML; G/100ML
1 LIQUID ORAL
Refills: 0 | Status: DISCONTINUED | OUTPATIENT
Start: 2022-03-11 | End: 2022-03-15

## 2022-03-11 RX ORDER — ASPIRIN/CALCIUM CARB/MAGNESIUM 324 MG
81 TABLET ORAL DAILY
Refills: 0 | Status: DISCONTINUED | OUTPATIENT
Start: 2022-03-11 | End: 2022-03-15

## 2022-03-11 RX ORDER — INSULIN GLARGINE 100 [IU]/ML
8 INJECTION, SOLUTION SUBCUTANEOUS AT BEDTIME
Refills: 0 | Status: DISCONTINUED | OUTPATIENT
Start: 2022-03-11 | End: 2022-03-15

## 2022-03-11 RX ORDER — QUETIAPINE FUMARATE 200 MG/1
25 TABLET, FILM COATED ORAL DAILY
Refills: 0 | Status: DISCONTINUED | OUTPATIENT
Start: 2022-03-11 | End: 2022-03-15

## 2022-03-11 RX ORDER — SODIUM CHLORIDE 5 G/100ML
100 INJECTION, SOLUTION INTRAVENOUS
Refills: 0 | Status: DISCONTINUED | OUTPATIENT
Start: 2022-03-11 | End: 2022-03-11

## 2022-03-11 RX ORDER — PANTOPRAZOLE SODIUM 20 MG/1
40 TABLET, DELAYED RELEASE ORAL DAILY
Refills: 0 | Status: DISCONTINUED | OUTPATIENT
Start: 2022-03-11 | End: 2022-03-12

## 2022-03-11 RX ORDER — TRAZODONE HCL 50 MG
150 TABLET ORAL AT BEDTIME
Refills: 0 | Status: DISCONTINUED | OUTPATIENT
Start: 2022-03-12 | End: 2022-03-15

## 2022-03-11 RX ORDER — FENTANYL CITRATE 50 UG/ML
25 INJECTION INTRAVENOUS EVERY 4 HOURS
Refills: 0 | Status: DISCONTINUED | OUTPATIENT
Start: 2022-03-11 | End: 2022-03-13

## 2022-03-11 RX ORDER — INSULIN LISPRO 100/ML
3 VIAL (ML) SUBCUTANEOUS
Refills: 0 | Status: DISCONTINUED | OUTPATIENT
Start: 2022-03-11 | End: 2022-03-15

## 2022-03-11 RX ORDER — ALPRAZOLAM 0.25 MG
0.5 TABLET ORAL
Refills: 0 | Status: DISCONTINUED | OUTPATIENT
Start: 2022-03-11 | End: 2022-03-15

## 2022-03-11 RX ORDER — INSULIN GLARGINE 100 [IU]/ML
15 INJECTION, SOLUTION SUBCUTANEOUS EVERY MORNING
Refills: 0 | Status: DISCONTINUED | OUTPATIENT
Start: 2022-03-11 | End: 2022-03-11

## 2022-03-11 RX ADMIN — INSULIN GLARGINE 8 UNIT(S): 100 INJECTION, SOLUTION SUBCUTANEOUS at 22:31

## 2022-03-11 RX ADMIN — ENOXAPARIN SODIUM 40 MILLIGRAM(S): 100 INJECTION SUBCUTANEOUS at 05:00

## 2022-03-11 RX ADMIN — PANTOPRAZOLE SODIUM 40 MILLIGRAM(S): 20 TABLET, DELAYED RELEASE ORAL at 11:56

## 2022-03-11 RX ADMIN — Medication 2: at 05:20

## 2022-03-11 RX ADMIN — FENTANYL CITRATE 25 MICROGRAM(S): 50 INJECTION INTRAVENOUS at 19:46

## 2022-03-11 RX ADMIN — CHLORHEXIDINE GLUCONATE 15 MILLILITER(S): 213 SOLUTION TOPICAL at 05:01

## 2022-03-11 RX ADMIN — Medication 150 MILLIGRAM(S): at 11:58

## 2022-03-11 RX ADMIN — DEXMEDETOMIDINE HYDROCHLORIDE IN 0.9% SODIUM CHLORIDE 5.06 MICROGRAM(S)/KG/HR: 4 INJECTION INTRAVENOUS at 07:44

## 2022-03-11 RX ADMIN — ALBUTEROL 2 PUFF(S): 90 AEROSOL, METERED ORAL at 03:24

## 2022-03-11 RX ADMIN — Medication 4: at 17:10

## 2022-03-11 RX ADMIN — CHLORHEXIDINE GLUCONATE 1 APPLICATION(S): 213 SOLUTION TOPICAL at 05:01

## 2022-03-11 RX ADMIN — Medication 2: at 13:02

## 2022-03-11 RX ADMIN — Medication 3 UNIT(S): at 17:07

## 2022-03-11 RX ADMIN — DEXMEDETOMIDINE HYDROCHLORIDE IN 0.9% SODIUM CHLORIDE 5.06 MICROGRAM(S)/KG/HR: 4 INJECTION INTRAVENOUS at 15:21

## 2022-03-11 RX ADMIN — Medication 40 MILLIGRAM(S): at 18:26

## 2022-03-11 RX ADMIN — BENZOCAINE AND MENTHOL 1 LOZENGE: 5; 1 LIQUID ORAL at 18:25

## 2022-03-11 RX ADMIN — Medication 40 MILLIGRAM(S): at 05:01

## 2022-03-11 RX ADMIN — QUETIAPINE FUMARATE 25 MILLIGRAM(S): 200 TABLET, FILM COATED ORAL at 11:57

## 2022-03-11 RX ADMIN — ALBUTEROL 2 PUFF(S): 90 AEROSOL, METERED ORAL at 21:25

## 2022-03-11 RX ADMIN — FENTANYL CITRATE 25 MICROGRAM(S): 50 INJECTION INTRAVENOUS at 20:14

## 2022-03-11 RX ADMIN — Medication 1 PUFF(S): at 21:26

## 2022-03-11 RX ADMIN — SODIUM ZIRCONIUM CYCLOSILICATE 10 GRAM(S): 10 POWDER, FOR SUSPENSION ORAL at 05:00

## 2022-03-11 NOTE — PROGRESS NOTE ADULT - SUBJECTIVE AND OBJECTIVE BOX
INTERVAL HPI/OVERNIGHT EVENTS:     PRESSORS: [ ] YES [ ] NO  WHICH:    ANTIBIOTICS:                  DATE STARTED:  ANTIBIOTICS:                  DATE STARTED:  ANTIBIOTICS:                  DATE STARTED:    Antimicrobial:    Cardiovascular:  norepinephrine Infusion 0.05 MICROgram(s)/kG/Min IV Continuous <Continuous>    Pulmonary:  ALBUTerol    90 MICROgram(s) HFA Inhaler 2 Puff(s) Inhalation every 6 hours  ipratropium 17 MICROgram(s) HFA Inhaler 1 Puff(s) Inhalation every 6 hours    Hematalogic:  enoxaparin Injectable 40 milliGRAM(s) SubCutaneous every 24 hours    Other:  ALPRAZolam 0.5 milliGRAM(s) Oral two times a day PRN  aspirin Suppository 300 milliGRAM(s) Rectal daily  chlorhexidine 2% Cloths 1 Application(s) Topical <User Schedule>  dexMEDEtomidine Infusion 0.2 MICROgram(s)/kG/Hr IV Continuous <Continuous>  fentaNYL    Injectable 25 MICROGram(s) IV Push every 4 hours PRN  insulin lispro (ADMELOG) corrective regimen sliding scale   SubCutaneous every 6 hours  methylPREDNISolone sodium succinate Injectable 40 milliGRAM(s) IV Push every 12 hours  pantoprazole   Suspension 40 milliGRAM(s) Enteral Tube daily  propofol Infusion 30 MICROgram(s)/kG/Min IV Continuous <Continuous>  QUEtiapine 25 milliGRAM(s) Oral daily  senna 2 Tablet(s) Oral at bedtime      Drug Dosing Weight  Height (cm): 172.7 (09 Mar 2022 10:55)  Weight (kg): 101.1 (09 Mar 2022 22:00)  BMI (kg/m2): 33.9 (09 Mar 2022 22:00)  BSA (m2): 2.14 (09 Mar 2022 22:00)    CENTRAL LINE: [ ] YES [ ] NO  LOCATION:   DATE INSERTED:  REMOVE: [ ] YES [ ] NO  EXPLAIN:    AMOR: [ ] YES [ ] NO    DATE INSERTED:  REMOVE:  [ ] YES [ ] NO  EXPLAIN:    A-LINE:  [ ] YES [ ] NO  LOCATION:   DATE INSERTED:  REMOVE:  [ ] YES [ ] NO  EXPLAIN:    PMH/Social Hx/Fam Hx -reviewed admission note, no change since admission  PAST MEDICAL & SURGICAL HISTORY:  COPD (chronic obstructive pulmonary disease)  Oxygen 2-3L at home    Stented coronary artery  2017    HLD (hyperlipidemia)    Pulmonary HTN    Type 2 diabetes mellitus without complication, with long-term current use of insulin    Coronary artery disease involving native coronary artery of native heart without angina pectoris    Smoker    Gastroesophageal reflux disease, esophagitis presence not specified    Oxygen dependent    DM (diabetes mellitus)    CAD (coronary artery disease)    GERD (gastroesophageal reflux disease)    Insomnia    CHF (congestive heart failure)    HTN (hypertension)    Mood disorder    No significant past surgical history      Heart faliure: acute [ ] chronic [ ] acute or chronic [ ] diastolic [ ] systolic [ ] combied systolic and diastolic[ ]  RADHIKA: ATN[ ] renal medullary necrosis [ ] CKD I [ ]CKDII [ ]CKD III [ ]CKD IV [ ]CKD V [ ]Other pathological lesions [ ]  Abdominal Nutrition Status: malnutrition [ ] cachexia [ ] morbid obesity/BMI=40 [ ] Supplement ordered [___________]     T(C): 37.1 (03-11-22 @ 12:30), Max: 38.1 (03-10-22 @ 16:00)  HR: 77 (03-11-22 @ 13:30)  BP: 129/62 (03-11-22 @ 13:30)  BP(mean): 82 (03-11-22 @ 13:30)  ABP: --  ABP(mean): --  RR: 19 (03-11-22 @ 13:30)  SpO2: 88% (03-11-22 @ 13:30)  Wt(kg): --    ABG - ( 11 Mar 2022 12:43 )  pH, Arterial: 7.44  pH, Blood: x     /  pCO2: 69    /  pO2: 74    / HCO3: 47    / Base Excess: 18.8  /  SaO2: 96                    03-10 @ 07:01  -  03-11 @ 07:00  --------------------------------------------------------  IN: 2101.3 mL / OUT: 1535 mL / NET: 566.3 mL        Mode: AC/ CMV (Assist Control/ Continuous Mandatory Ventilation)  RR (machine): 18  TV (machine): 450  FiO2: 50  PEEP: 5  ITime: 1  MAP: 10  PIP: 23      PHYSICAL EXAM:    GENERAL: [ ]NAD, [ ]well-groomed, [ ]well-developed  HEAD:  [ ]Atraumatic, [ ]Normocephalic  EYES: [ ]EOMI, [ ]PERRLA, [ ]conjunctiva and sclera clear  ENMT: [ ]No tonsillar erythema, exudates, or enlargement; [ ]Moist mucous membranes, [ ]Good dentition, [ ]No lesions  NECK: [ ]Supple, normal appearance, [ ]No JVD; [ ]Normal thyroid; [ ]Trachea midline  NERVOUS SYSTEM:  [ ]Alert & Oriented X3, [ ]Good concentration; [ ]Motor Strength 5/5 B/L upper and lower extremities; [ ]DTRs 2+ intact and symmetric  CHEST/LUNG: [ ]No chest deformity; [ ]Normal percussion bilaterally; [ ]No rales, rhonchi, wheezing; [ ]Crackles at bases  HEART: [ ]Regular rate and rhythm; [ ]No murmurs, rubs, or gallops  ABDOMEN: [ ]Soft, Nontender, Nondistended; [ ]Bowel sounds present  EXTREMITIES:  [ ]2+ Peripheral Pulses, [ ]No clubbing, cyanosis, or edema [ ]Bilat lower extremity edema  LYMPH: [ ]No lymphadenopathy noted  SKIN: [ ]No rashes or lesions; [ ]Good capillary refill      LABS:  CBC Full  -  ( 11 Mar 2022 04:13 )  WBC Count : 10.27 K/uL  RBC Count : 4.25 M/uL  Hemoglobin : 12.1 g/dL  Hematocrit : 39.4 %  Platelet Count - Automated : 168 K/uL  Mean Cell Volume : 92.7 fl  Mean Cell Hemoglobin : 28.5 pg  Mean Cell Hemoglobin Concentration : 30.7 gm/dL  Auto Neutrophil # : 8.65 K/uL  Auto Lymphocyte # : 1.00 K/uL  Auto Monocyte # : 0.56 K/uL  Auto Eosinophil # : 0.00 K/uL  Auto Basophil # : 0.01 K/uL  Auto Neutrophil % : 84.2 %  Auto Lymphocyte % : 9.7 %  Auto Monocyte % : 5.5 %  Auto Eosinophil % : 0.0 %  Auto Basophil % : 0.1 %    03-11    135  |  93<L>  |  27<H>  ----------------------------<  225<H>  4.8   |  41<H>  |  0.70    Ca    8.5      11 Mar 2022 04:13  Phos  3.0     03-11  Mg     1.9     03-11    TPro  6.0  /  Alb  2.8<L>  /  TBili  0.3  /  DBili  x   /  AST  13  /  ALT  22  /  AlkPhos  58  03-11            RADIOLOGY & ADDITIONAL STUDIES REVIEWED:      [ ]GOALS OF CARE DISCUSSION WITH PATIENT/FAMILY/PROXY:    CRITICAL CARE TIME SPENT: 35 minutes INTERVAL HPI/OVERNIGHT EVENTS:     PRESSORS: [ ] YES [ ] NO  WHICH:    ANTIBIOTICS:                  DATE STARTED:  ANTIBIOTICS:                  DATE STARTED:  ANTIBIOTICS:                  DATE STARTED:    Antimicrobial:    Cardiovascular:  norepinephrine Infusion 0.05 MICROgram(s)/kG/Min IV Continuous <Continuous>    Pulmonary:  ALBUTerol    90 MICROgram(s) HFA Inhaler 2 Puff(s) Inhalation every 6 hours  ipratropium 17 MICROgram(s) HFA Inhaler 1 Puff(s) Inhalation every 6 hours    Hematalogic:  enoxaparin Injectable 40 milliGRAM(s) SubCutaneous every 24 hours    Other:  ALPRAZolam 0.5 milliGRAM(s) Oral two times a day PRN  aspirin Suppository 300 milliGRAM(s) Rectal daily  chlorhexidine 2% Cloths 1 Application(s) Topical <User Schedule>  dexMEDEtomidine Infusion 0.2 MICROgram(s)/kG/Hr IV Continuous <Continuous>  fentaNYL    Injectable 25 MICROGram(s) IV Push every 4 hours PRN  insulin lispro (ADMELOG) corrective regimen sliding scale   SubCutaneous every 6 hours  methylPREDNISolone sodium succinate Injectable 40 milliGRAM(s) IV Push every 12 hours  pantoprazole   Suspension 40 milliGRAM(s) Enteral Tube daily  propofol Infusion 30 MICROgram(s)/kG/Min IV Continuous <Continuous>  QUEtiapine 25 milliGRAM(s) Oral daily  senna 2 Tablet(s) Oral at bedtime      Drug Dosing Weight  Height (cm): 172.7 (09 Mar 2022 10:55)  Weight (kg): 101.1 (09 Mar 2022 22:00)  BMI (kg/m2): 33.9 (09 Mar 2022 22:00)  BSA (m2): 2.14 (09 Mar 2022 22:00)    CENTRAL LINE: [ ] YES [ ] NO  LOCATION:   DATE INSERTED:  REMOVE: [ ] YES [ ] NO  EXPLAIN:    AMOR: [ ] YES [ ] NO    DATE INSERTED:  REMOVE:  [ ] YES [ ] NO  EXPLAIN:    A-LINE:  [ ] YES [ ] NO  LOCATION:   DATE INSERTED:  REMOVE:  [ ] YES [ ] NO  EXPLAIN:    PMH/Social Hx/Fam Hx -reviewed admission note, no change since admission  PAST MEDICAL & SURGICAL HISTORY:  COPD (chronic obstructive pulmonary disease)  Oxygen 2-3L at home    Stented coronary artery  2017    HLD (hyperlipidemia)    Pulmonary HTN    Type 2 diabetes mellitus without complication, with long-term current use of insulin    Coronary artery disease involving native coronary artery of native heart without angina pectoris    Smoker    Gastroesophageal reflux disease, esophagitis presence not specified    Oxygen dependent    DM (diabetes mellitus)    CAD (coronary artery disease)    GERD (gastroesophageal reflux disease)    Insomnia    CHF (congestive heart failure)    HTN (hypertension)    Mood disorder    No significant past surgical history      Heart faliure: acute [ ] chronic [ ] acute or chronic [ ] diastolic [ ] systolic [ ] combied systolic and diastolic[ ]  RADHIKA: ATN[ ] renal medullary necrosis [ ] CKD I [ ]CKDII [ ]CKD III [ ]CKD IV [ ]CKD V [ ]Other pathological lesions [ ]  Abdominal Nutrition Status: malnutrition [ ] cachexia [ ] morbid obesity/BMI=40 [ ] Supplement ordered [___________]     T(C): 37.1 (03-11-22 @ 12:30), Max: 38.1 (03-10-22 @ 16:00)  HR: 77 (03-11-22 @ 13:30)  BP: 129/62 (03-11-22 @ 13:30)  BP(mean): 82 (03-11-22 @ 13:30)  ABP: --  ABP(mean): --  RR: 19 (03-11-22 @ 13:30)  SpO2: 88% (03-11-22 @ 13:30)  Wt(kg): --    ABG - ( 11 Mar 2022 12:43 )  pH, Arterial: 7.44  pH, Blood: x     /  pCO2: 69    /  pO2: 74    / HCO3: 47    / Base Excess: 18.8  /  SaO2: 96                    03-10 @ 07:01  -  03-11 @ 07:00  --------------------------------------------------------  IN: 2101.3 mL / OUT: 1535 mL / NET: 566.3 mL        Mode: AC/ CMV (Assist Control/ Continuous Mandatory Ventilation)  RR (machine): 18  TV (machine): 450  FiO2: 50  PEEP: 5  ITime: 1  MAP: 10  PIP: 23      PHYSICAL EXAM:    GENERAL: Intubated   HEAD:  [s ]Atraumatic, [ ]Normocephalic  EYES: [s ]EOMI, [ ]PERRLA, [ ]conjunctiva and sclera clear  ENMT: [ ]No tonsillar erythema, exudates, or enlargement; [ ]Moist mucous membranes, [ ]Good dentition, [ ]No lesions  NECK: [ ]Supple, normal appearance, [ ]No JVD; [ ]Normal thyroid; [ ]Trachea midline  NERVOUS SYSTEM:  awake and follow simple commands   CHEST/LUNG: [ ]No chest deformity; [ ]Normal percussion bilaterally; [ ]No rales, rhonchi, wheezing; [ ]Crackles at bases  HEART: [ ]Regular rate and rhythm; [ ]No murmurs, rubs, or gallops  ABDOMEN: [ ]Soft, Nontender, Nondistended; [ ]Bowel sounds present  EXTREMITIES:  [ ]2+ Peripheral Pulses, [ ]No clubbing, cyanosis, or edema [ ]Bilat lower extremity edema  LYMPH: [ ]No lymphadenopathy noted  SKIN: [ ]No rashes or lesions; [ ]Good capillary refill      LABS:  CBC Full  -  ( 11 Mar 2022 04:13 )  WBC Count : 10.27 K/uL  RBC Count : 4.25 M/uL  Hemoglobin : 12.1 g/dL  Hematocrit : 39.4 %  Platelet Count - Automated : 168 K/uL  Mean Cell Volume : 92.7 fl  Mean Cell Hemoglobin : 28.5 pg  Mean Cell Hemoglobin Concentration : 30.7 gm/dL  Auto Neutrophil # : 8.65 K/uL  Auto Lymphocyte # : 1.00 K/uL  Auto Monocyte # : 0.56 K/uL  Auto Eosinophil # : 0.00 K/uL  Auto Basophil # : 0.01 K/uL  Auto Neutrophil % : 84.2 %  Auto Lymphocyte % : 9.7 %  Auto Monocyte % : 5.5 %  Auto Eosinophil % : 0.0 %  Auto Basophil % : 0.1 %    03-11    135  |  93<L>  |  27<H>  ----------------------------<  225<H>  4.8   |  41<H>  |  0.70    Ca    8.5      11 Mar 2022 04:13  Phos  3.0     03-11  Mg     1.9     03-11    TPro  6.0  /  Alb  2.8<L>  /  TBili  0.3  /  DBili  x   /  AST  13  /  ALT  22  /  AlkPhos  58  03-11            RADIOLOGY & ADDITIONAL STUDIES REVIEWED:      [ ]GOALS OF CARE DISCUSSION WITH PATIENT/FAMILY/PROXY:    CRITICAL CARE TIME SPENT: 35 minutes INTERVAL HPI/OVERNIGHT EVENTS:     PRESSORS: [ X] YES    ANTIBIOTICS:        levo          DATE STARTED: 0.05mcg started on 3/9/2022      Antimicrobial:    Cardiovascular:  norepinephrine Infusion 0.05 MICROgram(s)/kG/Min IV Continuous <Continuous>    Pulmonary:  ALBUTerol    90 MICROgram(s) HFA Inhaler 2 Puff(s) Inhalation every 6 hours  ipratropium 17 MICROgram(s) HFA Inhaler 1 Puff(s) Inhalation every 6 hours    Hematalogic:  enoxaparin Injectable 40 milliGRAM(s) SubCutaneous every 24 hours    Other:  ALPRAZolam 0.5 milliGRAM(s) Oral two times a day PRN  aspirin Suppository 300 milliGRAM(s) Rectal daily  chlorhexidine 2% Cloths 1 Application(s) Topical <User Schedule>  dexMEDEtomidine Infusion 0.2 MICROgram(s)/kG/Hr IV Continuous <Continuous>  fentaNYL    Injectable 25 MICROGram(s) IV Push every 4 hours PRN  insulin lispro (ADMELOG) corrective regimen sliding scale   SubCutaneous every 6 hours  methylPREDNISolone sodium succinate Injectable 40 milliGRAM(s) IV Push every 12 hours  pantoprazole   Suspension 40 milliGRAM(s) Enteral Tube daily  propofol Infusion 30 MICROgram(s)/kG/Min IV Continuous <Continuous>  QUEtiapine 25 milliGRAM(s) Oral daily  senna 2 Tablet(s) Oral at bedtime      Drug Dosing Weight  Height (cm): 172.7 (09 Mar 2022 10:55)  Weight (kg): 101.1 (09 Mar 2022 22:00)  BMI (kg/m2): 33.9 (09 Mar 2022 22:00)  BSA (m2): 2.14 (09 Mar 2022 22:00)    CENTRAL LINE:  [ X] NO         AMOR: [X ] YES     DATE INSERTED: 3/9/2022      A-LINE:   [ X] NO      PMH/Social Hx/Fam Hx -reviewed admission note, no change since admission  PAST MEDICAL & SURGICAL HISTORY:  COPD (chronic obstructive pulmonary disease)  Oxygen 2-3L at home    Stented coronary artery  2017    HLD (hyperlipidemia)    Pulmonary HTN    Type 2 diabetes mellitus without complication, with long-term current use of insulin    Coronary artery disease involving native coronary artery of native heart without angina pectoris    Smoker    Gastroesophageal reflux disease, esophagitis presence not specified    Oxygen dependent    DM (diabetes mellitus)    CAD (coronary artery disease)    GERD (gastroesophageal reflux disease)    Insomnia    CHF (congestive heart failure)    HTN (hypertension)    Mood disorder    No significant past surgical history      Heart faliure: acute [ ] chronic [ ] acute or chronic [ ] diastolic [ ] systolic [ ] combied systolic and diastolic[ ]  RADHIKA: ATN[ ] renal medullary necrosis [ ] CKD I [ ]CKDII [ ]CKD III [ ]CKD IV [ ]CKD V [ ]Other pathological lesions [ ]  Abdominal Nutrition Status: malnutrition [ ] cachexia [ ] morbid obesity/BMI=40 [ ] Supplement ordered [___________]     T(C): 37.1 (03-11-22 @ 12:30), Max: 38.1 (03-10-22 @ 16:00)  HR: 77 (03-11-22 @ 13:30)  BP: 129/62 (03-11-22 @ 13:30)  BP(mean): 82 (03-11-22 @ 13:30)  ABP: --  ABP(mean): --  RR: 19 (03-11-22 @ 13:30)  SpO2: 88% (03-11-22 @ 13:30)  Wt(kg): --    ABG - ( 11 Mar 2022 12:43 )  pH, Arterial: 7.44  pH, Blood: x     /  pCO2: 69    /  pO2: 74    / HCO3: 47    / Base Excess: 18.8  /  SaO2: 96                    03-10 @ 07:01  -  03-11 @ 07:00  --------------------------------------------------------  IN: 2101.3 mL / OUT: 1535 mL / NET: 566.3 mL        Mode: AC/ CMV (Assist Control/ Continuous Mandatory Ventilation)  RR (machine): 18  TV (machine): 450  FiO2: 50  PEEP: 5  ITime: 1  MAP: 10  PIP: 23      PHYSICAL EXAM:  GENERAL: NAD, lying in bed comfortably, wakes up intermittently sedated + ETT oral intubation   HEAD:  Atraumatic, Normocephalic  ENT: Moist mucous membranes  NECK: Supple, No JVD  CHEST/LUNG: Clear to auscultation bilaterally; No rales, rhonchi, + distant scattered wheezing b/L, no  rubs. Unlabored respirations  HEART: Regular rate and rhythm; No murmurs, rubs, or gallops  ABDOMEN: Bowel sounds present; Soft, Nontender, + distended. No hepatomegally  EXTREMITIES:  2+ Peripheral Pulses, brisk capillary refill. No clubbing, cyanosis, or edema  NERVOUS SYSTEM:  sedated   MSK: FROM all 4 extremities, full and equal strength  SKIN: No rashes or lesions      LABS:  CBC Full  -  ( 11 Mar 2022 04:13 )  WBC Count : 10.27 K/uL  RBC Count : 4.25 M/uL  Hemoglobin : 12.1 g/dL  Hematocrit : 39.4 %  Platelet Count - Automated : 168 K/uL  Mean Cell Volume : 92.7 fl  Mean Cell Hemoglobin : 28.5 pg  Mean Cell Hemoglobin Concentration : 30.7 gm/dL  Auto Neutrophil # : 8.65 K/uL  Auto Lymphocyte # : 1.00 K/uL  Auto Monocyte # : 0.56 K/uL  Auto Eosinophil # : 0.00 K/uL  Auto Basophil # : 0.01 K/uL  Auto Neutrophil % : 84.2 %  Auto Lymphocyte % : 9.7 %  Auto Monocyte % : 5.5 %  Auto Eosinophil % : 0.0 %  Auto Basophil % : 0.1 %    03-11    135  |  93<L>  |  27<H>  ----------------------------<  225<H>  4.8   |  41<H>  |  0.70    Ca    8.5      11 Mar 2022 04:13  Phos  3.0     03-11  Mg     1.9     03-11    TPro  6.0  /  Alb  2.8<L>  /  TBili  0.3  /  DBili  x   /  AST  13  /  ALT  22  /  AlkPhos  58  03-11            RADIOLOGY & ADDITIONAL STUDIES REVIEWED:      [X ]GOALS OF CARE DISCUSSION WITH PATIENT/FAMILY/PROXY:    CRITICAL CARE TIME SPENT: 35 minutes

## 2022-03-11 NOTE — SWALLOW BEDSIDE ASSESSMENT ADULT - SLP PERTINENT HISTORY OF CURRENT PROBLEM
62 yo M, from [facility], active smoker PMHx of CAD, ?CHF on BIPAP at NH, COPD, peripheral neuropathy, GERD, HTN, HLD, obesity, polyarthritis, Pulmonary HTN, TIA, Vit D Def came with chief complain of shortness of breath for one month. BIAPAP machine was malfunctioning in NH  Of note: In Aug 2021 he was intubated for COPD exacerbation. In Nov 2021 again admitted for COPD exacerbation.

## 2022-03-11 NOTE — AIRWAY REMOVAL NOTE  ADULT & PEDS - ARTIFICAL AIRWAY REMOVAL COMMENTS
@0935 am the patient was extubated  to BIPAP  with settings: 10/5/40% as per . No distress noted at this time will to monitor.

## 2022-03-11 NOTE — PHARMACOTHERAPY INTERVENTION NOTE - COMMENTS
Recommended switching pantoprazole from IV to Suspension because patient can take medication via NG tube.
Recommended to obtain triglyceride levels during the use of propofol.

## 2022-03-11 NOTE — SWALLOW BEDSIDE ASSESSMENT ADULT - PHARYNGEAL PHASE
Delayed pharyngeal swallow/Decreased laryngeal elevation/Multiple swallows Cough post oral intake/Throat clear post oral intake Delayed pharyngeal swallow/Decreased laryngeal elevation

## 2022-03-11 NOTE — SWALLOW BEDSIDE ASSESSMENT ADULT - SWALLOW EVAL: DIAGNOSIS
Pt p/w s&s oropharyngeal dysphagia c/b slow bolus formation & mastication, increased time for A-P transport, delayed swallow reflex, reduced hyolaryngeal elevation, and overt s&s of penetration/aspiration w/ thin liquids at this exam. Suspected premature spillage of thin bolus to the pharynx.

## 2022-03-11 NOTE — PROGRESS NOTE ADULT - SUBJECTIVE AND OBJECTIVE BOX
Patient is a 61y old  Male who presents with a chief complaint of COPD exacerbation (11 Mar 2022 14:12)    PATIENT IS SEEN AND EXAMINED IN MEDICAL FLOOR.    JAMILA [    ]    MT [   ]      GT [   ]    ALLERGIES:  No Known Allergies      Daily     Daily Weight in k.2 (11 Mar 2022 07:45)    VITALS:    Vital Signs Last 24 Hrs  T(C): 36.3 (11 Mar 2022 19:40), Max: 37.9 (11 Mar 2022 07:45)  T(F): 97.3 (11 Mar 2022 19:40), Max: 100.2 (11 Mar 2022 07:45)  HR: 65 (11 Mar 2022 20:22) (60 - 103)  BP: 119/64 (11 Mar 2022 20:00) (101/60 - 158/85)  BP(mean): 78 (11 Mar 2022 20:00) (73 - 134)  RR: 24 (11 Mar 2022 20:00) (15 - 26)  SpO2: 98% (11 Mar 2022 20:22) (88% - 99%)    LABS:    CBC Full  -  ( 11 Mar 2022 04:13 )  WBC Count : 10.27 K/uL  RBC Count : 4.25 M/uL  Hemoglobin : 12.1 g/dL  Hematocrit : 39.4 %  Platelet Count - Automated : 168 K/uL  Mean Cell Volume : 92.7 fl  Mean Cell Hemoglobin : 28.5 pg  Mean Cell Hemoglobin Concentration : 30.7 gm/dL  Auto Neutrophil # : 8.65 K/uL  Auto Lymphocyte # : 1.00 K/uL  Auto Monocyte # : 0.56 K/uL  Auto Eosinophil # : 0.00 K/uL  Auto Basophil # : 0.01 K/uL  Auto Neutrophil % : 84.2 %  Auto Lymphocyte % : 9.7 %  Auto Monocyte % : 5.5 %  Auto Eosinophil % : 0.0 %  Auto Basophil % : 0.1 %      03-11    135  |  93<L>  |  27<H>  ----------------------------<  225<H>  4.8   |  41<H>  |  0.70    Ca    8.5      11 Mar 2022 04:13  Phos  3.0     03-11  Mg     1.9     03-11    TPro  6.0  /  Alb  2.8<L>  /  TBili  0.3  /  DBili  x   /  AST  13  /  ALT  22  /  AlkPhos  58  03-11    CAPILLARY BLOOD GLUCOSE      POCT Blood Glucose.: 212 mg/dL (11 Mar 2022 16:25)  POCT Blood Glucose.: 223 mg/dL (11 Mar 2022 12:38)  POCT Blood Glucose.: 213 mg/dL (11 Mar 2022 05:03)        LIVER FUNCTIONS - ( 11 Mar 2022 04:13 )  Alb: 2.8 g/dL / Pro: 6.0 g/dL / ALK PHOS: 58 U/L / ALT: 22 U/L DA / AST: 13 U/L / GGT: x           Creatinine Trend: 0.70<--, 0.62<--, 0.63<--, 0.53<--, 0.58<--  I&O's Summary    10 Mar 2022 07:01  -  11 Mar 2022 07:00  --------------------------------------------------------  IN: 2101.3 mL / OUT: 1535 mL / NET: 566.3 mL    11 Mar 2022 07:01  -  11 Mar 2022 21:51  --------------------------------------------------------  IN: 187.8 mL / OUT: 1040 mL / NET: -852.2 mL        ABG - ( 11 Mar 2022 12:43 )  pH, Arterial: 7.44  pH, Blood: x     /  pCO2: 69    /  pO2: 74    / HCO3: 47    / Base Excess: 18.8  /  SaO2: 96                      MEDICATIONS:    MEDICATIONS  (STANDING):  ALBUTerol    90 MICROgram(s) HFA Inhaler 2 Puff(s) Inhalation every 6 hours  aspirin enteric coated 81 milliGRAM(s) Oral daily  benzocaine 15 mG/menthol 3.6 mG Lozenge 1 Lozenge Oral two times a day  chlorhexidine 2% Cloths 1 Application(s) Topical <User Schedule>  dexMEDEtomidine Infusion 0.2 MICROgram(s)/kG/Hr (5.06 mL/Hr) IV Continuous <Continuous>  enoxaparin Injectable 40 milliGRAM(s) SubCutaneous every 24 hours  insulin glargine Injectable (LANTUS) 8 Unit(s) SubCutaneous at bedtime  insulin lispro (ADMELOG) corrective regimen sliding scale   SubCutaneous three times a day before meals  insulin lispro Injectable (ADMELOG) 3 Unit(s) SubCutaneous three times a day before meals  ipratropium 17 MICROgram(s) HFA Inhaler 1 Puff(s) Inhalation every 6 hours  methylPREDNISolone sodium succinate Injectable 40 milliGRAM(s) IV Push every 12 hours  pantoprazole   Suspension 40 milliGRAM(s) Enteral Tube daily  QUEtiapine 25 milliGRAM(s) Oral daily  senna 2 Tablet(s) Oral at bedtime      MEDICATIONS  (PRN):  ALPRAZolam 0.5 milliGRAM(s) Oral two times a day PRN Agitation/anxiety  fentaNYL    Injectable 25 MICROGram(s) IV Push every 4 hours PRN Moderate Pain (4 - 6)        REVIEW OF SYSTEMS:                           ALL ROS DONE [ X   ]      CONSTITUTIONAL:  LETHARGIC [   ], FEVER [   ], UNRESPONSIVE [   ]  CVS:  CP  [   ], SOB, [   ], PALPITATIONS [   ], DIZZYNESS [   ]  RS: COUGH [   ], SPUTUM [   ]  GI: ABDOMINAL PAIN [   ], NAUSEA [   ], VOMITINGS [   ], DIARRHEA [   ], CONSTIPATION [   ]  :  DYSURIA [   ], NOCTURIA [   ], INCREASED FREQUENCY [   ], DRIBLING [   ],  SKELETAL: PAINFUL JOINTS [   ], SWOLLEN JOINTS [   ], NECK ACHE [   ], LOW BACK ACHE [   ],  SKIN : ULCERS [   ], RASH [   ], ITCHING [   ]  CNS: HEAD ACHE [   ], DOUBLE VISION [   ], BLURRED VISION [   ], AMS / CONFUSION [   ], SEIZURES [   ], WEAKNESS [   ],TINGLING / NUMBNESS [   ]          PHYSICAL EXAMINATION:    GENERAL APPEARANCE: NO DISTRESS  HEENT:  NO PALLOR, NO  JVD,  NO   NODES, NECK SUPPLE    ORAL ET + , ORAL GT +  CVS: S1 +, S2 +,   RS: AEEB,  OCCASIONAL  RALES +,   NO RONCHI  ABD: SOFT, NT, NO, BS +                                                           AMOR +  EXT: PE ++  SKIN: WARM,   SKELETAL:  ROM REDUCED AT CERVICAL & LS SPINE   CNS:  AAO X 2   , NO  DEFICITS        RADIOLOGY :    < from: Xray Chest 1 View-PORTABLE IMMEDIATE (Xray Chest 1 View-PORTABLE IMMEDIATE .) (03.10.22 @ 17:11) >    IMPRESSION: Improved right base infiltrate. COPD NG tube. No bowel   obstruction.    < end of copied text >        ASSESSMENT :     Shortness of breath    COPD (chronic obstructive pulmonary disease)    Stented coronary artery    HLD (hyperlipidemia)    Pulmonary HTN    Type 2 diabetes mellitus without complication, with long-term current use of insulin    Coronary artery disease involving native coronary artery of native heart without angina pectoris    Bipolar 1 disorder    Smoker    Gastroesophageal reflux disease, esophagitis presence not specified    Oxygen dependent    COPD (chronic obstructive pulmonary disease)    DM (diabetes mellitus)    CAD (coronary artery disease)    GERD (gastroesophageal reflux disease)    HLD (hyperlipidemia)    Insomnia    Polyarthritis    CHF (congestive heart failure)    HTN (hypertension)    Mood disorder          PLAN:  HPI:  60 yo M, from [place], active smoker PMHx of CAD, ?CHF, COPD, peripheral neuropathy, GERD, HTN, HLD, obesity, polyarthritis, Pulmonary HTN, TIA, Vit D Def came with chief complain of shortness of breadth . As per ED triage pt BIAPAP machine was malfunctioning in NH. He used BIPAP at night in NH. Pt is a mild historian and unable to provide detailed hx due to BIPAP and lethargic in the setting of CO2 retention. On assessment he is lethargic but arousable and follows simple commands. Pt stated that he came to the hospital because he is having shortness of breadth for one month. He denied any chest pain, N/V/D.     Of note Pt non complaint to his BIPAP use . In Aug 2021 he was intubated for COPD exacerbation. In 2021 again admitted for COPD exacerbation.    ED course: Pt has increase work of breathing. ABG shows CO2 125, PH 7.18 obtained on 4lit NC . After using bipap repeat blood gas on 3 lit NC shows Ph 7.3 , CO2 >125.   (09 Mar 2022 16:38)      - COPD EXACERBATION, HYPOXIC & HYPERCAPNOEIC RESPIRATORY FAILURE - STARTED ON IV METHYLPREDNISOLONE - S/P EXTUBATION ON 22 ,  ICU F/UP IS IN PROGRESS - PATIENT IS TOLERATING WELL     - METABOLIC ENCEPHALOPATHY - RESOLVING     - GI AND DVT PROPHYLAXIS    - DR. REFUGIO CABELLO

## 2022-03-11 NOTE — PROGRESS NOTE ADULT - ASSESSMENT
Patient is a 61 year old male, from [place], active smoker PMHx of CAD, ?CHF, COPD, peripheral neuropathy, GERD, HTN, HLD, obesity, polyarthritis, Pulmonary HTN, TIA, Vit D Def came with chief complain of shortness of breath.   RRT called in ED due to worsening SOB and hypoxia. Patient placed on BiPAP and given solumedrol 125mg. Patient to be transferred to ICU for closer monitoring.     Assessment:  - Acute hypoxic hypercapnic respiratory failure   - COPD exacerbation   - CHF   - Respiratory acidosis   - HTN  - HLD  - CAD  - GERD  - Pulmonary HTN   - Obesity   - Mood disorder   - DM   - Hyperkalemia     Plan:  Neuro:  - no acute issues   Hyperactivity and agitation requiring intubation for airway protection  Now on propofol 30mg and still mildly agitated- can increase as needed for light sedation  Also on fentanyl 2mc  Will add Precedex or zyprexa closer to extubation    Cardiovascular:  #CHF   - patient with history of CHF  - last ECHO showed normal EF last year   - given 20mg IV lasix on admission  - repeat ECHO pending  - fluid restrictions  - strict I/O show OP of 3L  - daily weight     #HTN  - monitor BP and resume meds as needed  - for now on levo at 0.05mcg due to propofol    #HLD   - continue with statin when able to take PO meds     #CAD  - continue with aspirin when able to take PO meds     Pulmonary:   #Acute hypoxic hypercapnic respiratory failure likely due to COPD exacerbation   - patient presented due to SOB   - likely due to noncompliance of NIV machine  - patient noted to be tachypneic in ED and placed on BiPAP  - However intubated due to agitation    #COPD  - given solumedrol 125mg in ED  - will continue with solumedrol 40mg q8 for now  - was on Abx for CAP however patient is from NH, and has low suspicion for PNA at this time, will dc Ceftriaxone and Azithro    - monitor O2 sat   - repeat ABG in AM   - repeat CXR in AM   - albuterol and symbicort     #Respiratory acidosis   - noted to have CO2 125 now 80s improving baseline likely 60s  - patient appears to be chronic retainer  - placed on BiPAP with improvement in pH  - However intubated due to agitation  -monitor ABGs      Infectious Diseases:  - no acute issues   - s/p ctx and zithro for COPD exacerbation   - Patient has low s/o infection     Gastrointestinal:  - Can start TF tomorrow  - continue with PPI     Renal:  #Hyperkalemia  - noted to have potassium of 5.6  -s/p D50 and insulin and Kayexalate   -Lokelma 10mg TID for now  - monitor BMP in the PM and reassess    #Hyponatremia  - noted to have sodium of 133 on admission   -Likely euvolemic, hypotonic hyponatremia  -likely Due to SIADH with psych meds vs COPD( lung disease)  -Will f/u Urine studies (Urine Osm, Urine sodium)  - monitor BMP for now     Heme/onc:   - no acute issues   -Lovenox for PPX    Endo:   #DM  - monitor BS q6 while patient is NPO  - f/u A1c  - monitor BS and adjust insulin as needed     Skin/ catheter:   - no acute issues     Prophylaxis:   - lovenox for DVT prophylaxis  - PPI for GI prophylaxis     Goals of Care:   - Full Code     Dispo: ICU   Patient is a 61 year old male, from [place], active smoker PMHx of CAD, ?CHF, COPD, peripheral neuropathy, GERD, HTN, HLD, obesity, polyarthritis, Pulmonary HTN, TIA, Vit D Def came with chief complain of shortness of breath.   RRT called in ED due to worsening SOB and hypoxia. Patient placed on BiPAP and given solumedrol 125mg. Patient to be transferred to ICU for closer monitoring.     Assessment:  - Acute hypoxic hypercapnic respiratory failure   - COPD exacerbation   - CHF   - Respiratory acidosis   - HTN  - HLD  - CAD  - GERD  - Pulmonary HTN   - Obesity   - Mood disorder   - DM   - Hyperkalemia     Plan:  Neuro:  - no acute issues   Hyperactivity and agitation requiring intubation for airway protection  still awake off and on prop  Also on fentanyl 2mc  Added Precedex overnight: patient is appropriate for extubation  will also start home meds, Trazadone and PRN xanax   will add seroquel for mood stabilization for now    Cardiovascular:  #CHF   - patient with history of CHF  - last ECHO showed normal EF last year   - given 20mg IV lasix on admission  - Echo shows normal EF but pulm HTN with RV pressure 53mmHg  - strict I/O show OP of 1.5L  -will assess volume status to restart lasix- hold for now    #HTN  - monitor BP and resume meds as needed  - was on levo at 0.05mcg due to propofol  -now off levo and prop BP stable 120s/60s    #HLD   - continue with statin when able to take PO meds     #CAD  - continue with aspirin when able to take PO meds     Pulmonary:   #Acute hypoxic hypercapnic respiratory failure likely due to COPD exacerbation   - patient presented due to SOB   -Not hypoxic at presentation  -was hypercapnic but not altered Mental status from baseline,   - likely due to noncompliance of NIV machine  - patient noted to be tachypneic in ED and placed on BiPAP  - However intubated due to agitation  -CO2 responded appropriately and agitation has resolve  -extubate to BIPAP repeat gas in PM  -Continue with BIPAP at night and if hypercapnic >70 with AMS    #COPD  - given solumedrol 125mg in ED  - then was on  solumedrol 40mg q8 -  -switch to 40mg q12h  - was on Abx for CAP however patient is from [place], and has low suspicion for PNA at this time, will dc Ceftriaxone and Azithro  - albuterol and symbicort    #Respiratory acidosis   - noted to have CO2 125 now 80s improving baseline likely 60s  - patient appears to be chronic retainer  - placed on BiPAP with improvement in pH  - However intubated due to agitation  -monitor ABGs      Infectious Diseases:  - no acute issues   - s/p ctx and zithro for COPD exacerbation   -fever yesterday 100.5 low grade  - Patient has low s/o infection will monitor for now      Gastrointestinal:  - Has NG tube, but now extubated to BIPAP  -Will do dysphagia screen and initiate pO feeds  - continue with PPI through tomorrow    Renal:  #Hyperkalemia  - noted to have potassium of 5.6  -s/p D50 and insulin and Kayexalate   -Lokelma 10mg TID to stop at 24h  - K now is stable    #Hyponatremia  - noted to have sodium of 133 on admission   -Likely euvolemic, hypotonic hyponatremia  -likely Due to SIADH with psych meds vs COPD( lung disease)  -now resolved     Heme/onc:   - no acute issues   -Lovenox for PPX    Endo:   #DM  - monitor BS q6 while patient is NPO  - f/u A1c  - monitor BS and adjust insulin as needed     Skin/ catheter:   - no acute issues     Prophylaxis:   - lovenox for DVT prophylaxis  - PPI for GI prophylaxis     Goals of Care:   - Full Code     Dispo: ICU   Patient is a 61 year old male, from NH, active smoker PMHx of CAD, ?CHF, COPD, peripheral neuropathy, GERD, HTN, HLD, obesity, polyarthritis, Pulmonary HTN, TIA, Vit D Def came with chief complain of shortness of breath.   RRT called in ED due to worsening SOB and hypoxia. Patient placed on BiPAP and given solumedrol 125mg. Patient to be transferred to ICU for closer monitoring.     Assessment:  - Acute hypoxic hypercapnic respiratory failure   - COPD exacerbation   - CHF   - Respiratory acidosis   - HTN  - HLD  - CAD  - GERD  - Pulmonary HTN   - Obesity   - Mood disorder   - DM   - Hyperkalemia     Plan:  Neuro:  - no acute issues   Hyperactivity and agitation requiring intubation for airway protection  still awake off and on prop  Also on fentanyl 2mc  Added Precedex overnight: patient is appropriate for extubation  will also start home meds, Trazadone and PRN xanax   will add seroquel for mood stabilization for now    Cardiovascular:  #CHF   - patient with history of CHF  - last ECHO showed normal EF last year   - given 20mg IV lasix on admission  - Echo shows normal EF but pulm HTN with RV pressure 53mmHg  - strict I/O show OP of 1.5L  -will assess volume status to restart lasix- hold for now    #HTN  - monitor BP and resume meds as needed  - was on levo at 0.05mcg due to propofol  -now off levo and prop BP stable 120s/60s    #HLD   - continue with statin when able to take PO meds     #CAD  - continue with aspirin when able to take PO meds     Pulmonary:   #Acute hypoxic hypercapnic respiratory failure likely due to COPD exacerbation   - patient presented due to SOB   -Not hypoxic at presentation  -was hypercapnic but not altered Mental status from baseline,   - likely due to noncompliance of NIV machine  - patient noted to be tachypneic in ED and placed on BiPAP  - However intubated due to agitation  -CO2 responded appropriately and agitation has resolve  -extubate to BIPAP repeat gas in PM  -Continue with BIPAP at night and if hypercapnic >70 with AMS    #COPD  - given solumedrol 125mg in ED  - then was on  solumedrol 40mg q8 -  -switch to 40mg q12h  - was on Abx for CAP however patient is from [place], and has low suspicion for PNA at this time, will dc Ceftriaxone and Azithro  - albuterol and symbicort    #Respiratory acidosis   - noted to have CO2 125 now 80s improving baseline likely 60s  - patient appears to be chronic retainer  - placed on BiPAP with improvement in pH  - However intubated due to agitation  -monitor ABGs      Infectious Diseases:  - no acute issues   - s/p ctx and zithro for COPD exacerbation   -fever yesterday 100.5 low grade  - Patient has low s/o infection will monitor for now      Gastrointestinal:  - Has NG tube, but now getting extubated to BIPAP  -Will do dysphagia screen and initiate pO feeds  - continue with PPI through tomorrow    Renal:  #Hyperkalemia  - noted to have potassium of 5.6  -s/p D50 and insulin and Kayexalate   -Lokelma 10mg TID to stop at 24h  - K now is stable    #Hyponatremia  - noted to have sodium of 133 on admission   -Likely euvolemic, hypotonic hyponatremia  -likely Due to SIADH with psych meds vs COPD( lung disease)  -now resolved     Heme/onc:   - no acute issues   -Lovenox for PPX    Endo:   #DM  - monitor BS q6 while patient is NPO  - f/u A1c  - monitor BS and adjust insulin as needed     Skin/ catheter:   - no acute issues     Prophylaxis:   - lovenox for DVT prophylaxis  - PPI for GI prophylaxis     Goals of Care:   - Full Code     Dispo: ICU

## 2022-03-11 NOTE — SWALLOW BEDSIDE ASSESSMENT ADULT - MODE OF PRESENTATION
x 2 oz/spoon/fed by clinician spoon x 3; cup x 3/cup/spoon/fed by clinician x 4/spoon/fed by clinician fed by clinician

## 2022-03-12 LAB
ALBUMIN SERPL ELPH-MCNC: 2.9 G/DL — LOW (ref 3.5–5)
ALP SERPL-CCNC: 52 U/L — SIGNIFICANT CHANGE UP (ref 40–120)
ALT FLD-CCNC: 20 U/L DA — SIGNIFICANT CHANGE UP (ref 10–60)
ANION GAP SERPL CALC-SCNC: 0 MMOL/L — LOW (ref 5–17)
AST SERPL-CCNC: 13 U/L — SIGNIFICANT CHANGE UP (ref 10–40)
BASOPHILS # BLD AUTO: 0.02 K/UL — SIGNIFICANT CHANGE UP (ref 0–0.2)
BASOPHILS NFR BLD AUTO: 0.2 % — SIGNIFICANT CHANGE UP (ref 0–2)
BILIRUB SERPL-MCNC: 0.6 MG/DL — SIGNIFICANT CHANGE UP (ref 0.2–1.2)
BUN SERPL-MCNC: 24 MG/DL — HIGH (ref 7–18)
CALCIUM SERPL-MCNC: 8.5 MG/DL — SIGNIFICANT CHANGE UP (ref 8.4–10.5)
CHLORIDE SERPL-SCNC: 97 MMOL/L — SIGNIFICANT CHANGE UP (ref 96–108)
CO2 SERPL-SCNC: 43 MMOL/L — HIGH (ref 22–31)
CREAT SERPL-MCNC: 0.49 MG/DL — LOW (ref 0.5–1.3)
EGFR: 118 ML/MIN/1.73M2 — SIGNIFICANT CHANGE UP
EOSINOPHIL # BLD AUTO: 0.02 K/UL — SIGNIFICANT CHANGE UP (ref 0–0.5)
EOSINOPHIL NFR BLD AUTO: 0.2 % — SIGNIFICANT CHANGE UP (ref 0–6)
GLUCOSE SERPL-MCNC: 127 MG/DL — HIGH (ref 70–99)
HCT VFR BLD CALC: 40.5 % — SIGNIFICANT CHANGE UP (ref 39–50)
HGB BLD-MCNC: 12.5 G/DL — LOW (ref 13–17)
IMM GRANULOCYTES NFR BLD AUTO: 0.6 % — SIGNIFICANT CHANGE UP (ref 0–1.5)
LYMPHOCYTES # BLD AUTO: 1.84 K/UL — SIGNIFICANT CHANGE UP (ref 1–3.3)
LYMPHOCYTES # BLD AUTO: 18.4 % — SIGNIFICANT CHANGE UP (ref 13–44)
MAGNESIUM SERPL-MCNC: 2.1 MG/DL — SIGNIFICANT CHANGE UP (ref 1.6–2.6)
MCHC RBC-ENTMCNC: 28.7 PG — SIGNIFICANT CHANGE UP (ref 27–34)
MCHC RBC-ENTMCNC: 30.9 GM/DL — LOW (ref 32–36)
MCV RBC AUTO: 92.9 FL — SIGNIFICANT CHANGE UP (ref 80–100)
MONOCYTES # BLD AUTO: 0.98 K/UL — HIGH (ref 0–0.9)
MONOCYTES NFR BLD AUTO: 9.8 % — SIGNIFICANT CHANGE UP (ref 2–14)
NEUTROPHILS # BLD AUTO: 7.06 K/UL — SIGNIFICANT CHANGE UP (ref 1.8–7.4)
NEUTROPHILS NFR BLD AUTO: 70.8 % — SIGNIFICANT CHANGE UP (ref 43–77)
NRBC # BLD: 0 /100 WBCS — SIGNIFICANT CHANGE UP (ref 0–0)
PHOSPHATE SERPL-MCNC: 3.6 MG/DL — SIGNIFICANT CHANGE UP (ref 2.5–4.5)
PLATELET # BLD AUTO: 153 K/UL — SIGNIFICANT CHANGE UP (ref 150–400)
POTASSIUM SERPL-MCNC: 3.8 MMOL/L — SIGNIFICANT CHANGE UP (ref 3.5–5.3)
POTASSIUM SERPL-SCNC: 3.8 MMOL/L — SIGNIFICANT CHANGE UP (ref 3.5–5.3)
PROT SERPL-MCNC: 6.1 G/DL — SIGNIFICANT CHANGE UP (ref 6–8.3)
RBC # BLD: 4.36 M/UL — SIGNIFICANT CHANGE UP (ref 4.2–5.8)
RBC # FLD: 13.2 % — SIGNIFICANT CHANGE UP (ref 10.3–14.5)
SODIUM SERPL-SCNC: 140 MMOL/L — SIGNIFICANT CHANGE UP (ref 135–145)
WBC # BLD: 9.98 K/UL — SIGNIFICANT CHANGE UP (ref 3.8–10.5)
WBC # FLD AUTO: 9.98 K/UL — SIGNIFICANT CHANGE UP (ref 3.8–10.5)

## 2022-03-12 RX ORDER — ALPRAZOLAM 0.25 MG
1 TABLET ORAL ONCE
Refills: 0 | Status: DISCONTINUED | OUTPATIENT
Start: 2022-03-12 | End: 2022-03-12

## 2022-03-12 RX ADMIN — Medication 1 PUFF(S): at 23:08

## 2022-03-12 RX ADMIN — INSULIN GLARGINE 8 UNIT(S): 100 INJECTION, SOLUTION SUBCUTANEOUS at 21:59

## 2022-03-12 RX ADMIN — Medication 1 MILLIGRAM(S): at 12:44

## 2022-03-12 RX ADMIN — QUETIAPINE FUMARATE 25 MILLIGRAM(S): 200 TABLET, FILM COATED ORAL at 11:49

## 2022-03-12 RX ADMIN — Medication 0.5 MILLIGRAM(S): at 12:16

## 2022-03-12 RX ADMIN — Medication 3 UNIT(S): at 11:37

## 2022-03-12 RX ADMIN — Medication 81 MILLIGRAM(S): at 11:49

## 2022-03-12 RX ADMIN — Medication 150 MILLIGRAM(S): at 21:59

## 2022-03-12 RX ADMIN — Medication 2: at 11:31

## 2022-03-12 RX ADMIN — CHLORHEXIDINE GLUCONATE 1 APPLICATION(S): 213 SOLUTION TOPICAL at 05:14

## 2022-03-12 RX ADMIN — ENOXAPARIN SODIUM 40 MILLIGRAM(S): 100 INJECTION SUBCUTANEOUS at 05:14

## 2022-03-12 RX ADMIN — Medication 3 UNIT(S): at 16:59

## 2022-03-12 RX ADMIN — Medication 0.5 MILLIGRAM(S): at 12:13

## 2022-03-12 RX ADMIN — FENTANYL CITRATE 25 MICROGRAM(S): 50 INJECTION INTRAVENOUS at 22:38

## 2022-03-12 RX ADMIN — ALBUTEROL 2 PUFF(S): 90 AEROSOL, METERED ORAL at 02:55

## 2022-03-12 RX ADMIN — Medication 1 PUFF(S): at 02:56

## 2022-03-12 RX ADMIN — Medication 40 MILLIGRAM(S): at 17:31

## 2022-03-12 RX ADMIN — FENTANYL CITRATE 25 MICROGRAM(S): 50 INJECTION INTRAVENOUS at 21:59

## 2022-03-12 RX ADMIN — SENNA PLUS 2 TABLET(S): 8.6 TABLET ORAL at 21:59

## 2022-03-12 RX ADMIN — FENTANYL CITRATE 25 MICROGRAM(S): 50 INJECTION INTRAVENOUS at 04:48

## 2022-03-12 RX ADMIN — Medication 1: at 16:57

## 2022-03-12 RX ADMIN — Medication 40 MILLIGRAM(S): at 05:14

## 2022-03-12 RX ADMIN — Medication 1 PUFF(S): at 18:26

## 2022-03-12 RX ADMIN — ALBUTEROL 2 PUFF(S): 90 AEROSOL, METERED ORAL at 23:07

## 2022-03-12 RX ADMIN — FENTANYL CITRATE 25 MICROGRAM(S): 50 INJECTION INTRAVENOUS at 02:13

## 2022-03-12 RX ADMIN — ALBUTEROL 2 PUFF(S): 90 AEROSOL, METERED ORAL at 18:25

## 2022-03-12 NOTE — PROGRESS NOTE ADULT - ASSESSMENT
Patient is a 61 year old male, from [place], active smoker PMHx of CAD, ?CHF, COPD, peripheral neuropathy, GERD, HTN, HLD, obesity, polyarthritis, Pulmonary HTN, TIA, Vit D Def came with chief complain of shortness of breath.   RRT called in ED due to worsening SOB and hypoxia. Patient placed on BiPAP and given solumedrol 125mg. Patient to be transferred to ICU for closer monitoring.     Assessment:  - Acute hypoxic hypercapnic respiratory failure   - COPD exacerbation   - CHF   - Respiratory acidosis   - HTN  - HLD  - CAD  - GERD  - Pulmonary HTN   - Obesity   - Mood disorder   - DM   - Hyperkalemia     Plan:  Neuro:  - no acute issues   Hyperactivity and agitation requiring intubation for airway protection  still awake off and on prop  Also on fentanyl 2mc  Added Precedex overnight: patient is appropriate for extubation  will also start home meds, Trazadone and PRN xanax   will add seroquel for mood stabilization for now    Cardiovascular:  #CHF   - patient with history of CHF  - last ECHO showed normal EF last year   - given 20mg IV lasix on admission  - Echo shows normal EF but pulm HTN with RV pressure 53mmHg  - strict I/O show OP of 1.5L  -will assess volume status to restart lasix- hold for now    #HTN  - monitor BP and resume meds as needed  - was on levo at 0.05mcg due to propofol  -now off levo and prop BP stable 120s/60s    #HLD   - continue with statin when able to take PO meds     #CAD  - continue with aspirin when able to take PO meds     Pulmonary:   #Acute hypoxic hypercapnic respiratory failure likely due to COPD exacerbation   - patient presented due to SOB   -Not hypoxic at presentation  -was hypercapnic but not altered Mental status from baseline,   - likely due to noncompliance of NIV machine  - patient noted to be tachypneic in ED and placed on BiPAP  - However intubated due to agitation  -CO2 responded appropriately and agitation has resolve  -extubate to BIPAP repeat gas in PM  -Continue with BIPAP at night and if hypercapnic >70 with AMS    #COPD  - given solumedrol 125mg in ED  - then was on  solumedrol 40mg q8 -  -switch to 40mg q12h  - was on Abx for CAP however patient is from [place], and has low suspicion for PNA at this time, will dc Ceftriaxone and Azithro  - albuterol and symbicort    #Respiratory acidosis   - noted to have CO2 125 now 80s improving baseline likely 60s  - patient appears to be chronic retainer  - placed on BiPAP with improvement in pH  - However intubated due to agitation  -monitor ABGs      Infectious Diseases:  - no acute issues   - s/p ctx and zithro for COPD exacerbation   -fever yesterday 100.5 low grade  - Patient has low s/o infection will monitor for now      Gastrointestinal:  - Has NG tube, but now getting extubated to BIPAP  -Will do dysphagia screen and initiate pO feeds  - continue with PPI through tomorrow    Renal:  #Hyperkalemia  - noted to have potassium of 5.6  -s/p D50 and insulin and Kayexalate   -Lokelma 10mg TID to stop at 24h  - K now is stable    #Hyponatremia  - noted to have sodium of 133 on admission   -Likely euvolemic, hypotonic hyponatremia  -likely Due to SIADH with psych meds vs COPD( lung disease)  -now resolved     Heme/onc:   - no acute issues   -Lovenox for PPX    Endo:   #DM  - monitor BS q6 while patient is NPO  - f/u A1c  - monitor BS and adjust insulin as needed     Skin/ catheter:   - no acute issues     Prophylaxis:   - lovenox for DVT prophylaxis  - PPI for GI prophylaxis     Goals of Care:   - Full Code     Dispo: ICU   Patient is a 61 year old male, from [place], active smoker PMHx of CAD, ?CHF, COPD, peripheral neuropathy, GERD, HTN, HLD, obesity, polyarthritis, Pulmonary HTN, TIA, Vit D Def came with chief complain of shortness of breath.   RRT called in ED due to worsening SOB and hypoxia. Patient placed on BiPAP and given solumedrol 125mg. Patient to be transferred to ICU for closer monitoring.     Assessment:  - Acute hypoxic hypercapnic respiratory failure   - COPD exacerbation   - CHF   - Respiratory acidosis   - HTN  - HLD  - CAD  - GERD  - Pulmonary HTN   - Obesity   - Mood disorder   - DM   - Hyperkalemia   - Metabolic alkalosis    Plan:  Neuro:  - #Back Pain  S/p extubation 3/11  c/w fentanyl push for back pain  Off Precedex     #Anxiety  C/w home meds, Trazadone and PRN xanax   added seroquel for mood stabilization for now    Cardiovascular:  #CHF   - patient with history of CHF  - last ECHO showed normal EF last year   - given 20mg IV lasix on admission  - Echo shows normal EF but pulm HTN with RV pressure 53mmHg  - strict I/O show OP of 1.5L  -will assess volume status to restart lasix- hold for now    #HTN  - monitor BP and resume meds as needed  -now off levo and prop BP stable 120s/60s    #HLD   - continue with statin when able to take PO meds     #CAD  - continue with aspirin when able to take PO meds     Pulmonary:   #Acute hypoxic hypercapnic respiratory failure likely due to COPD exacerbation   -was hypercapnic but not altered Mental status from baseline,   - likely due to noncompliance of NIV machine  - s/p intubation 3/10 and extubation 3/11  - Now on NC4L   - ABG 7.44/69/74/47    #COPD  - given solumedrol 125mg in ED  -now on 40mg q12h  - was on Abx for CAP however patient is from [place], and has low suspicion for PNA at this time, will dc Ceftriaxone and Azithro  - albuterol and symbicort    #Respiratory acidosis   - resloved    Infectious Diseases:  - no acute issues   - s/p ctx and zithro for COPD exacerbation   -fever yesterday 100.5 low grade  - Patient has low s/o infection will monitor for now      Gastrointestinal:  - Has NG tube, but now getting extubated to BIPAP  -Will do dysphagia screen and initiate pO feeds  - continue with PPI through tomorrow    Renal:  #Metabolic alkalosis  - AB.44/69/74/47  - Fully compensated with elevation of pCO2  - Not on diuretics now  - No diarreha  - Due to PPI? DCd PPI    #Hyperkalemia  - resolved    #Hyponatremia  -resolved    Heme/onc:   - no acute issues   -Lovenox for PPX    Endo:   #DM  - Hb A1c 7.1  - monitor BS and adjust insulin as needed     Skin/ catheter:   - no acute issues     Prophylaxis:   - lovenox for DVT prophylaxis    Goals of Care:   - Full Code     Dispo: ICU   Patient is a 61 year old male, from [place], active smoker PMHx of CAD, ?CHF, COPD, peripheral neuropathy, GERD, HTN, HLD, obesity, polyarthritis, Pulmonary HTN, TIA, Vit D Def came with chief complain of shortness of breath.   RRT called in ED due to worsening SOB and hypoxia. Patient placed on BiPAP and given solumedrol 125mg. Patient to be transferred to ICU for closer monitoring.     Assessment:  - Acute hypoxic hypercapnic respiratory failure   - COPD exacerbation   - CHF   - Respiratory acidosis   - HTN  - HLD  - CAD  - GERD  - Pulmonary HTN   - Obesity   - Mood disorder   - DM   - Hyperkalemia   - Metabolic alkalosis    Plan:  Neuro:  - #Back Pain  S/p extubation 3/11  c/w fentanyl push for back pain  Off Precedex - no agitation    #Anxiety  C/w home meds, Trazadone and PRN xanax   added seroquel for mood stabilization for now    Cardiovascular:  #CHF   - patient with history of CHF  - last ECHO showed normal EF last year   - given 20mg IV lasix on admission  - Echo shows normal EF but pulm HTN with RV pressure 53mmHg  - strict I/O show OP of 1.5L  -will assess volume status to restart lasix- hold for now    #HTN  - monitor BP and resume meds as needed  -now off levo and prop BP stable 120s/60s    #HLD   - continue with statin when able to take PO meds     #CAD  - continue with aspirin when able to take PO meds     Pulmonary:   #Acute hypoxic hypercapnic respiratory failure likely due to COPD exacerbation   -was hypercapnic but not altered Mental status from baseline,   - likely due to noncompliance of NIV machine  - s/p intubation 3/10 and extubation 3/11  - Now on NC4L   - ABG 7.44/69/74/47    #COPD  - given solumedrol 125mg in ED  -now on 40mg q12h  - was on Abx for CAP however patient is from [place], and has low suspicion for PNA at this time, will dc Ceftriaxone and Azithro  - albuterol and symbicort    #Respiratory acidosis   - resloved    Infectious Diseases:  - no acute issues   - s/p ctx and zithro for COPD exacerbation   -fever yesterday 100.5 low grade  - Patient has low s/o infection will monitor for now      Gastrointestinal:  - Has NG tube, but now getting extubated to BIPAP  -Will do dysphagia screen and initiate pO feeds  - continue with PPI through tomorrow    Renal:  #Metabolic alkalosis  - AB.44/69/74/47  - Fully compensated with elevation of pCO2  - Not on diuretics now  - No diarreha  - Due to PPI? DCd PPI    #Hyperkalemia  - resolved    #Hyponatremia  -resolved    Heme/onc:   - no acute issues   -Lovenox for PPX    Endo:   #DM  - Hb A1c 7.1  - monitor BS and adjust insulin as needed     Skin/ catheter:   - no acute issues     Prophylaxis:   - lovenox for DVT prophylaxis    Goals of Care:   - Full Code     Dispo: ICU

## 2022-03-12 NOTE — PROGRESS NOTE ADULT - SUBJECTIVE AND OBJECTIVE BOX
INTERVAL HPI/OVERNIGHT EVENTS: No acute events overnight. No BiPAP initiated as patient in non-complaint, but he is answering question appropriate.    Antimicrobial:    Cardiovascular:    Pulmonary:  ALBUTerol    90 MICROgram(s) HFA Inhaler 2 Puff(s) Inhalation every 6 hours  ipratropium 17 MICROgram(s) HFA Inhaler 1 Puff(s) Inhalation every 6 hours    Hematalogic:  aspirin enteric coated 81 milliGRAM(s) Oral daily  enoxaparin Injectable 40 milliGRAM(s) SubCutaneous every 24 hours    Other:  ALPRAZolam 0.5 milliGRAM(s) Oral two times a day PRN  benzocaine 15 mG/menthol 3.6 mG Lozenge 1 Lozenge Oral two times a day  chlorhexidine 2% Cloths 1 Application(s) Topical <User Schedule>  dexMEDEtomidine Infusion 0.2 MICROgram(s)/kG/Hr IV Continuous <Continuous>  fentaNYL    Injectable 25 MICROGram(s) IV Push every 4 hours PRN  insulin glargine Injectable (LANTUS) 8 Unit(s) SubCutaneous at bedtime  insulin lispro (ADMELOG) corrective regimen sliding scale   SubCutaneous three times a day before meals  insulin lispro Injectable (ADMELOG) 3 Unit(s) SubCutaneous three times a day before meals  methylPREDNISolone sodium succinate Injectable 40 milliGRAM(s) IV Push every 12 hours  pantoprazole   Suspension 40 milliGRAM(s) Enteral Tube daily  QUEtiapine 25 milliGRAM(s) Oral daily  senna 2 Tablet(s) Oral at bedtime  traZODone 150 milliGRAM(s) Oral at bedtime    ALBUTerol    90 MICROgram(s) HFA Inhaler 2 Puff(s) Inhalation every 6 hours  ALPRAZolam 0.5 milliGRAM(s) Oral two times a day PRN  aspirin enteric coated 81 milliGRAM(s) Oral daily  benzocaine 15 mG/menthol 3.6 mG Lozenge 1 Lozenge Oral two times a day  chlorhexidine 2% Cloths 1 Application(s) Topical <User Schedule>  dexMEDEtomidine Infusion 0.2 MICROgram(s)/kG/Hr IV Continuous <Continuous>  enoxaparin Injectable 40 milliGRAM(s) SubCutaneous every 24 hours  fentaNYL    Injectable 25 MICROGram(s) IV Push every 4 hours PRN  insulin glargine Injectable (LANTUS) 8 Unit(s) SubCutaneous at bedtime  insulin lispro (ADMELOG) corrective regimen sliding scale   SubCutaneous three times a day before meals  insulin lispro Injectable (ADMELOG) 3 Unit(s) SubCutaneous three times a day before meals  ipratropium 17 MICROgram(s) HFA Inhaler 1 Puff(s) Inhalation every 6 hours  methylPREDNISolone sodium succinate Injectable 40 milliGRAM(s) IV Push every 12 hours  pantoprazole   Suspension 40 milliGRAM(s) Enteral Tube daily  QUEtiapine 25 milliGRAM(s) Oral daily  senna 2 Tablet(s) Oral at bedtime  traZODone 150 milliGRAM(s) Oral at bedtime    Drug Dosing Weight  Height (cm): 172.7 (09 Mar 2022 10:55)  Weight (kg): 101.1 (09 Mar 2022 22:00)  BMI (kg/m2): 33.9 (09 Mar 2022 22:00)  BSA (m2): 2.14 (09 Mar 2022 22:00)    ICU Vital Signs Last 24 Hrs  T(C): 36.4 (11 Mar 2022 23:25), Max: 37.9 (11 Mar 2022 07:45)  T(F): 97.5 (11 Mar 2022 23:25), Max: 100.2 (11 Mar 2022 07:45)  HR: 86 (12 Mar 2022 02:00) (60 - 97)  BP: 126/61 (12 Mar 2022 02:00) (101/55 - 158/85)  BP(mean): 79 (12 Mar 2022 02:00) (65 - 134)  ABP: --  ABP(mean): --  RR: 25 (12 Mar 2022 02:00) (15 - 27)  SpO2: 94% (12 Mar 2022 02:00) (88% - 99%)      ABG - ( 11 Mar 2022 12:43 )  pH, Arterial: 7.44  pH, Blood: x     /  pCO2: 69    /  pO2: 74    / HCO3: 47    / Base Excess: 18.8  /  SaO2: 96                    03-10 @ 07:01  -  03-11 @ 07:00  --------------------------------------------------------  IN: 2101.3 mL / OUT: 1535 mL / NET: 566.3 mL        Mode: AC/ CMV (Assist Control/ Continuous Mandatory Ventilation)  RR (machine): 18  TV (machine): 450  FiO2: 50  PEEP: 5  ITime: 1  MAP: 10  PIP: 23        PHYSICAL EXAM:  GENERAL: NAD, lying in bed comfortably, wakes up intermittently sedated + ETT oral intubation   HEAD:  Atraumatic, Normocephalic  ENT: Moist mucous membranes  NECK: Supple, No JVD  CHEST/LUNG: Clear to auscultation bilaterally; No rales, rhonchi, + distant scattered wheezing b/L, no  rubs. Unlabored respirations  HEART: Regular rate and rhythm; No murmurs, rubs, or gallops  ABDOMEN: Bowel sounds present; Soft, Nontender, + distended. No hepatomegally  EXTREMITIES:  2+ Peripheral Pulses, brisk capillary refill. No clubbing, cyanosis, or edema  NERVOUS SYSTEM:  sedated   MSK: FROM all 4 extremities, full and equal strength  SKIN: No rashes or lesions      LABS:  CBC Full  -  ( 11 Mar 2022 04:13 )  WBC Count : 10.27 K/uL  RBC Count : 4.25 M/uL  Hemoglobin : 12.1 g/dL  Hematocrit : 39.4 %  Platelet Count - Automated : 168 K/uL  Mean Cell Volume : 92.7 fl  Mean Cell Hemoglobin : 28.5 pg  Mean Cell Hemoglobin Concentration : 30.7 gm/dL  Auto Neutrophil # : 8.65 K/uL  Auto Lymphocyte # : 1.00 K/uL  Auto Monocyte # : 0.56 K/uL  Auto Eosinophil # : 0.00 K/uL  Auto Basophil # : 0.01 K/uL  Auto Neutrophil % : 84.2 %  Auto Lymphocyte % : 9.7 %  Auto Monocyte % : 5.5 %  Auto Eosinophil % : 0.0 %  Auto Basophil % : 0.1 %    03-11    135  |  93<L>  |  27<H>  ----------------------------<  225<H>  4.8   |  41<H>  |  0.70    Ca    8.5      11 Mar 2022 04:13  Phos  3.0     03-11  Mg     1.9     03-11    TPro  6.0  /  Alb  2.8<L>  /  TBili  0.3  /  DBili  x   /  AST  13  /  ALT  22  /  AlkPhos  58  03-11            RADIOLOGY & ADDITIONAL STUDIES REVIEWED:  Yes    CRITICAL CARE TIME SPENT: 35 minutes INTERVAL HPI/OVERNIGHT EVENTS: No acute events overnight. No BiPAP initiated as patient in non-complaint, but he is answering question appropriate. Pt was placed on BiPAP for 2 hrs 6am-8am. Complaining back pain    Antimicrobial:    Cardiovascular:    Pulmonary:  ALBUTerol    90 MICROgram(s) HFA Inhaler 2 Puff(s) Inhalation every 6 hours  ipratropium 17 MICROgram(s) HFA Inhaler 1 Puff(s) Inhalation every 6 hours    Hematalogic:  aspirin enteric coated 81 milliGRAM(s) Oral daily  enoxaparin Injectable 40 milliGRAM(s) SubCutaneous every 24 hours    Other:  ALPRAZolam 0.5 milliGRAM(s) Oral two times a day PRN  benzocaine 15 mG/menthol 3.6 mG Lozenge 1 Lozenge Oral two times a day  chlorhexidine 2% Cloths 1 Application(s) Topical <User Schedule>  dexMEDEtomidine Infusion 0.2 MICROgram(s)/kG/Hr IV Continuous <Continuous>  fentaNYL    Injectable 25 MICROGram(s) IV Push every 4 hours PRN  insulin glargine Injectable (LANTUS) 8 Unit(s) SubCutaneous at bedtime  insulin lispro (ADMELOG) corrective regimen sliding scale   SubCutaneous three times a day before meals  insulin lispro Injectable (ADMELOG) 3 Unit(s) SubCutaneous three times a day before meals  methylPREDNISolone sodium succinate Injectable 40 milliGRAM(s) IV Push every 12 hours  pantoprazole   Suspension 40 milliGRAM(s) Enteral Tube daily  QUEtiapine 25 milliGRAM(s) Oral daily  senna 2 Tablet(s) Oral at bedtime  traZODone 150 milliGRAM(s) Oral at bedtime    ALBUTerol    90 MICROgram(s) HFA Inhaler 2 Puff(s) Inhalation every 6 hours  ALPRAZolam 0.5 milliGRAM(s) Oral two times a day PRN  aspirin enteric coated 81 milliGRAM(s) Oral daily  benzocaine 15 mG/menthol 3.6 mG Lozenge 1 Lozenge Oral two times a day  chlorhexidine 2% Cloths 1 Application(s) Topical <User Schedule>  dexMEDEtomidine Infusion 0.2 MICROgram(s)/kG/Hr IV Continuous <Continuous>  enoxaparin Injectable 40 milliGRAM(s) SubCutaneous every 24 hours  fentaNYL    Injectable 25 MICROGram(s) IV Push every 4 hours PRN  insulin glargine Injectable (LANTUS) 8 Unit(s) SubCutaneous at bedtime  insulin lispro (ADMELOG) corrective regimen sliding scale   SubCutaneous three times a day before meals  insulin lispro Injectable (ADMELOG) 3 Unit(s) SubCutaneous three times a day before meals  ipratropium 17 MICROgram(s) HFA Inhaler 1 Puff(s) Inhalation every 6 hours  methylPREDNISolone sodium succinate Injectable 40 milliGRAM(s) IV Push every 12 hours  pantoprazole   Suspension 40 milliGRAM(s) Enteral Tube daily  QUEtiapine 25 milliGRAM(s) Oral daily  senna 2 Tablet(s) Oral at bedtime  traZODone 150 milliGRAM(s) Oral at bedtime    Drug Dosing Weight  Height (cm): 172.7 (09 Mar 2022 10:55)  Weight (kg): 101.1 (09 Mar 2022 22:00)  BMI (kg/m2): 33.9 (09 Mar 2022 22:00)  BSA (m2): 2.14 (09 Mar 2022 22:00)    ICU Vital Signs Last 24 Hrs  T(C): 36.4 (11 Mar 2022 23:25), Max: 37.9 (11 Mar 2022 07:45)  T(F): 97.5 (11 Mar 2022 23:25), Max: 100.2 (11 Mar 2022 07:45)  HR: 86 (12 Mar 2022 02:00) (60 - 97)  BP: 126/61 (12 Mar 2022 02:00) (101/55 - 158/85)  BP(mean): 79 (12 Mar 2022 02:00) (65 - 134)  ABP: --  ABP(mean): --  RR: 25 (12 Mar 2022 02:00) (15 - 27)  SpO2: 94% (12 Mar 2022 02:00) (88% - 99%)      ABG - ( 11 Mar 2022 12:43 )  pH, Arterial: 7.44  pH, Blood: x     /  pCO2: 69    /  pO2: 74    / HCO3: 47    / Base Excess: 18.8  /  SaO2: 96                    03-10 @ 07:01  -  03-11 @ 07:00  --------------------------------------------------------  IN: 2101.3 mL / OUT: 1535 mL / NET: 566.3 mL        Mode: AC/ CMV (Assist Control/ Continuous Mandatory Ventilation)  RR (machine): 18  TV (machine): 450  FiO2: 50  PEEP: 5  ITime: 1  MAP: 10  PIP: 23        PHYSICAL EXAM:  GENERAL: mild distress with pain, lying in bed comfortably   HEAD:  Atraumatic, Normocephalic  ENT: Moist mucous membranes  NECK: Supple, No JVD  CHEST/LUNG: No rales, rhonchi, + distant scattered wheezing b/L, no  rubs. Unlabored respirations  HEART: Regular rate and rhythm; No murmurs, rubs, or gallops  ABDOMEN: Bowel sounds present; Soft, Nontender, + distended. No hepatomegaly  EXTREMITIES:  2+ Peripheral Pulses, brisk capillary refill. No clubbing, cyanosis, or edema  NERVOUS SYSTEM:  Awake and alert  MSK: Moving all extremities   SKIN: No rashes or lesions      LABS:  CBC Full  -  ( 11 Mar 2022 04:13 )  WBC Count : 10.27 K/uL  RBC Count : 4.25 M/uL  Hemoglobin : 12.1 g/dL  Hematocrit : 39.4 %  Platelet Count - Automated : 168 K/uL  Mean Cell Volume : 92.7 fl  Mean Cell Hemoglobin : 28.5 pg  Mean Cell Hemoglobin Concentration : 30.7 gm/dL  Auto Neutrophil # : 8.65 K/uL  Auto Lymphocyte # : 1.00 K/uL  Auto Monocyte # : 0.56 K/uL  Auto Eosinophil # : 0.00 K/uL  Auto Basophil # : 0.01 K/uL  Auto Neutrophil % : 84.2 %  Auto Lymphocyte % : 9.7 %  Auto Monocyte % : 5.5 %  Auto Eosinophil % : 0.0 %  Auto Basophil % : 0.1 %    03-11    135  |  93<L>  |  27<H>  ----------------------------<  225<H>  4.8   |  41<H>  |  0.70    Ca    8.5      11 Mar 2022 04:13  Phos  3.0     03-11  Mg     1.9     03-11    TPro  6.0  /  Alb  2.8<L>  /  TBili  0.3  /  DBili  x   /  AST  13  /  ALT  22  /  AlkPhos  58  03-11            RADIOLOGY & ADDITIONAL STUDIES REVIEWED:  Yes    CRITICAL CARE TIME SPENT: 35 minutes INTERVAL HPI/OVERNIGHT EVENTS: No acute events overnight. No BiPAP initiated as patient in non-complaint, but he is answering question appropriate. Pt was placed on BiPAP for 2 hrs 6am-8am. Complaining back pain    Antimicrobial:    Cardiovascular:    Pulmonary:  ALBUTerol    90 MICROgram(s) HFA Inhaler 2 Puff(s) Inhalation every 6 hours  ipratropium 17 MICROgram(s) HFA Inhaler 1 Puff(s) Inhalation every 6 hours    Hematalogic:  aspirin enteric coated 81 milliGRAM(s) Oral daily  enoxaparin Injectable 40 milliGRAM(s) SubCutaneous every 24 hours    Other:  ALPRAZolam 0.5 milliGRAM(s) Oral two times a day PRN  benzocaine 15 mG/menthol 3.6 mG Lozenge 1 Lozenge Oral two times a day  chlorhexidine 2% Cloths 1 Application(s) Topical <User Schedule>  dexMEDEtomidine Infusion 0.2 MICROgram(s)/kG/Hr IV Continuous <Continuous>  fentaNYL    Injectable 25 MICROGram(s) IV Push every 4 hours PRN  insulin glargine Injectable (LANTUS) 8 Unit(s) SubCutaneous at bedtime  insulin lispro (ADMELOG) corrective regimen sliding scale   SubCutaneous three times a day before meals  insulin lispro Injectable (ADMELOG) 3 Unit(s) SubCutaneous three times a day before meals  methylPREDNISolone sodium succinate Injectable 40 milliGRAM(s) IV Push every 12 hours  pantoprazole   Suspension 40 milliGRAM(s) Enteral Tube daily  QUEtiapine 25 milliGRAM(s) Oral daily  senna 2 Tablet(s) Oral at bedtime  traZODone 150 milliGRAM(s) Oral at bedtime    ALBUTerol    90 MICROgram(s) HFA Inhaler 2 Puff(s) Inhalation every 6 hours  ALPRAZolam 0.5 milliGRAM(s) Oral two times a day PRN  aspirin enteric coated 81 milliGRAM(s) Oral daily  benzocaine 15 mG/menthol 3.6 mG Lozenge 1 Lozenge Oral two times a day  chlorhexidine 2% Cloths 1 Application(s) Topical <User Schedule>  dexMEDEtomidine Infusion 0.2 MICROgram(s)/kG/Hr IV Continuous <Continuous>  enoxaparin Injectable 40 milliGRAM(s) SubCutaneous every 24 hours  fentaNYL    Injectable 25 MICROGram(s) IV Push every 4 hours PRN  insulin glargine Injectable (LANTUS) 8 Unit(s) SubCutaneous at bedtime  insulin lispro (ADMELOG) corrective regimen sliding scale   SubCutaneous three times a day before meals  insulin lispro Injectable (ADMELOG) 3 Unit(s) SubCutaneous three times a day before meals  ipratropium 17 MICROgram(s) HFA Inhaler 1 Puff(s) Inhalation every 6 hours  methylPREDNISolone sodium succinate Injectable 40 milliGRAM(s) IV Push every 12 hours  pantoprazole   Suspension 40 milliGRAM(s) Enteral Tube daily  QUEtiapine 25 milliGRAM(s) Oral daily  senna 2 Tablet(s) Oral at bedtime  traZODone 150 milliGRAM(s) Oral at bedtime    Drug Dosing Weight  Height (cm): 172.7 (09 Mar 2022 10:55)  Weight (kg): 101.1 (09 Mar 2022 22:00)  BMI (kg/m2): 33.9 (09 Mar 2022 22:00)  BSA (m2): 2.14 (09 Mar 2022 22:00)    ICU Vital Signs Last 24 Hrs  T(C): 36.4 (11 Mar 2022 23:25), Max: 37.9 (11 Mar 2022 07:45)  T(F): 97.5 (11 Mar 2022 23:25), Max: 100.2 (11 Mar 2022 07:45)  HR: 86 (12 Mar 2022 02:00) (60 - 97)  BP: 126/61 (12 Mar 2022 02:00) (101/55 - 158/85)  BP(mean): 79 (12 Mar 2022 02:00) (65 - 134)  ABP: --  ABP(mean): --  RR: 25 (12 Mar 2022 02:00) (15 - 27)  SpO2: 94% (12 Mar 2022 02:00) (88% - 99%)      ABG - ( 11 Mar 2022 12:43 )  pH, Arterial: 7.44  pH, Blood: x     /  pCO2: 69    /  pO2: 74    / HCO3: 47    / Base Excess: 18.8  /  SaO2: 96                    03-10 @ 07:01  -  03-11 @ 07:00  --------------------------------------------------------  IN: 2101.3 mL / OUT: 1535 mL / NET: 566.3 mL              PHYSICAL EXAM:  GENERAL: mild distress with pain, lying in bed comfortably   HEAD:  Atraumatic, Normocephalic  ENT: Moist mucous membranes  NECK: Supple, No JVD  CHEST/LUNG: No rales, rhonchi, + distant scattered wheezing b/L, no  rubs. Unlabored respirations  HEART: Regular rate and rhythm; No murmurs, rubs, or gallops  ABDOMEN: Bowel sounds present; Soft, Nontender, + distended. No hepatomegaly  EXTREMITIES:  2+ Peripheral Pulses, brisk capillary refill. No clubbing, cyanosis, or edema  NERVOUS SYSTEM:  Awake and alert  MSK: Moving all extremities   SKIN: No rashes or lesions      LABS:  CBC Full  -  ( 11 Mar 2022 04:13 )  WBC Count : 10.27 K/uL  RBC Count : 4.25 M/uL  Hemoglobin : 12.1 g/dL  Hematocrit : 39.4 %  Platelet Count - Automated : 168 K/uL  Mean Cell Volume : 92.7 fl  Mean Cell Hemoglobin : 28.5 pg  Mean Cell Hemoglobin Concentration : 30.7 gm/dL  Auto Neutrophil # : 8.65 K/uL  Auto Lymphocyte # : 1.00 K/uL  Auto Monocyte # : 0.56 K/uL  Auto Eosinophil # : 0.00 K/uL  Auto Basophil # : 0.01 K/uL  Auto Neutrophil % : 84.2 %  Auto Lymphocyte % : 9.7 %  Auto Monocyte % : 5.5 %  Auto Eosinophil % : 0.0 %  Auto Basophil % : 0.1 %    03-11    135  |  93<L>  |  27<H>  ----------------------------<  225<H>  4.8   |  41<H>  |  0.70    Ca    8.5      11 Mar 2022 04:13  Phos  3.0     03-11  Mg     1.9     03-11    TPro  6.0  /  Alb  2.8<L>  /  TBili  0.3  /  DBili  x   /  AST  13  /  ALT  22  /  AlkPhos  58  03-11            RADIOLOGY & ADDITIONAL STUDIES REVIEWED:  Yes    CRITICAL CARE TIME SPENT: 35 minutes

## 2022-03-12 NOTE — PROGRESS NOTE ADULT - SUBJECTIVE AND OBJECTIVE BOX
Patient is a 61y old  Male who presents with a chief complaint of COPD exacerbation (12 Mar 2022 02:28)    PATIENT IS SEEN AND EXAMINED IN MEDICAL FLOOR.      ALLERGIES:  No Known Allergies      VITALS:    Vital Signs Last 24 Hrs  T(C): 36.1 (12 Mar 2022 16:12), Max: 37.1 (12 Mar 2022 08:00)  T(F): 97 (12 Mar 2022 16:12), Max: 98.8 (12 Mar 2022 08:00)  HR: 105 (12 Mar 2022 16:04) (60 - 114)  BP: 161/81 (12 Mar 2022 13:00) (101/55 - 161/81)  BP(mean): 100 (12 Mar 2022 13:00) (65 - 100)  RR: 26 (12 Mar 2022 13:00) (21 - 44)  SpO2: 91% (12 Mar 2022 16:04) (77% - 98%)    LABS:    CBC Full  -  ( 12 Mar 2022 04:47 )  WBC Count : 9.98 K/uL  RBC Count : 4.36 M/uL  Hemoglobin : 12.5 g/dL  Hematocrit : 40.5 %  Platelet Count - Automated : 153 K/uL  Mean Cell Volume : 92.9 fl  Mean Cell Hemoglobin : 28.7 pg  Mean Cell Hemoglobin Concentration : 30.9 gm/dL  Auto Neutrophil # : 7.06 K/uL  Auto Lymphocyte # : 1.84 K/uL  Auto Monocyte # : 0.98 K/uL  Auto Eosinophil # : 0.02 K/uL  Auto Basophil # : 0.02 K/uL  Auto Neutrophil % : 70.8 %  Auto Lymphocyte % : 18.4 %  Auto Monocyte % : 9.8 %  Auto Eosinophil % : 0.2 %  Auto Basophil % : 0.2 %      03-12    140  |  97  |  24<H>  ----------------------------<  127<H>  3.8   |  43<H>  |  0.49<L>    Ca    8.5      12 Mar 2022 04:47  Phos  3.6     03-12  Mg     2.1     03-12    TPro  6.1  /  Alb  2.9<L>  /  TBili  0.6  /  DBili  x   /  AST  13  /  ALT  20  /  AlkPhos  52  03-12        CAPILLARY BLOOD GLUCOSE    POCT Blood Glucose.: 192 mg/dL (12 Mar 2022 16:37)  POCT Blood Glucose.: 209 mg/dL (12 Mar 2022 11:16)  POCT Blood Glucose.: 133 mg/dL (12 Mar 2022 06:00)        LIVER FUNCTIONS - ( 12 Mar 2022 04:47 )  Alb: 2.9 g/dL / Pro: 6.1 g/dL / ALK PHOS: 52 U/L / ALT: 20 U/L DA / AST: 13 U/L / GGT: x           Creatinine Trend: 0.49<--, 0.70<--, 0.62<--, 0.63<--, 0.53<--, 0.58<--  I&O's Summary    11 Mar 2022 07:01  -  12 Mar 2022 07:00  --------------------------------------------------------  IN: 187.8 mL / OUT: 1940 mL / NET: -1752.2 mL        ABG - ( 11 Mar 2022 12:43 )  pH, Arterial: 7.44  pH, Blood: x     /  pCO2: 69    /  pO2: 74    / HCO3: 47    / Base Excess: 18.8  /  SaO2: 96          MEDICATIONS:    MEDICATIONS  (STANDING):  ALBUTerol    90 MICROgram(s) HFA Inhaler 2 Puff(s) Inhalation every 6 hours  aspirin enteric coated 81 milliGRAM(s) Oral daily  benzocaine 15 mG/menthol 3.6 mG Lozenge 1 Lozenge Oral two times a day  chlorhexidine 2% Cloths 1 Application(s) Topical <User Schedule>  dexMEDEtomidine Infusion 0.2 MICROgram(s)/kG/Hr (5.06 mL/Hr) IV Continuous <Continuous>  enoxaparin Injectable 40 milliGRAM(s) SubCutaneous every 24 hours  insulin glargine Injectable (LANTUS) 8 Unit(s) SubCutaneous at bedtime  insulin lispro (ADMELOG) corrective regimen sliding scale   SubCutaneous three times a day before meals  insulin lispro Injectable (ADMELOG) 3 Unit(s) SubCutaneous three times a day before meals  ipratropium 17 MICROgram(s) HFA Inhaler 1 Puff(s) Inhalation every 6 hours  methylPREDNISolone sodium succinate Injectable 40 milliGRAM(s) IV Push every 12 hours  QUEtiapine 25 milliGRAM(s) Oral daily  senna 2 Tablet(s) Oral at bedtime  traZODone 150 milliGRAM(s) Oral at bedtime      MEDICATIONS  (PRN):  ALPRAZolam 0.5 milliGRAM(s) Oral two times a day PRN Agitation/anxiety  fentaNYL    Injectable 25 MICROGram(s) IV Push every 4 hours PRN Moderate Pain (4 - 6)        REVIEW OF SYSTEMS:                           ALL ROS DONE [ X   ]      CONSTITUTIONAL:  LETHARGIC [   ], FEVER [   ], UNRESPONSIVE [   ]  CVS:  CP  [   ], SOB, [   ], PALPITATIONS [   ], DIZZINESS [   ]  RS: COUGH [   ], SPUTUM [   ]  GI: ABDOMINAL PAIN [   ], NAUSEA [   ], VOMITING [   ], DIARRHEA [   ], CONSTIPATION [   ]  :  DYSURIA [   ], NOCTURIA [   ], INCREASED FREQUENCY [   ], DRIBLING [   ],  SKELETAL: PAINFUL JOINTS [   ], SWOLLEN JOINTS [   ], NECK ACHE [   ], LOW BACK ACHE [   ],  SKIN : ULCERS [   ], RASH [   ], ITCHING [   ]  CNS: HEAD ACHE [   ], DOUBLE VISION [   ], BLURRED VISION [   ], AMS / CONFUSION [   ], SEIZURES [   ], WEAKNESS [   ],TINGLING / NUMBNESS [   ]        PHYSICAL EXAMINATION:    GENERAL APPEARANCE: NO DISTRESS  HEENT:  NO PALLOR, NO  JVD,  NO   NODES, NECK SUPPLE    ORAL ET + , ORAL GT +  CVS: S1 +, S2 +,   RS: AEEB,  OCCASIONAL  RALES +,   B/L RONCHI +  ABD: SOFT, NT, NO, BS +                                                           AMOR +  EXT: PE ++  SKIN: WARM,   SKELETAL:  ROM REDUCED AT CERVICAL & LS SPINE   CNS:  AAO X 2   , NO  DEFICITS        RADIOLOGY :    < from: Xray Chest 1 View-PORTABLE IMMEDIATE (Xray Chest 1 View-PORTABLE IMMEDIATE .) (03.10.22 @ 17:11) >    IMPRESSION: Improved right base infiltrate. COPD NG tube. No bowel   obstruction.    < end of copied text >        ASSESSMENT :     Shortness of breath    COPD (chronic obstructive pulmonary disease)    Stented coronary artery    HLD (hyperlipidemia)    Pulmonary HTN    Type 2 diabetes mellitus without complication, with long-term current use of insulin    Coronary artery disease involving native coronary artery of native heart without angina pectoris    Bipolar 1 disorder    Smoker    Gastroesophageal reflux disease, esophagitis presence not specified    Oxygen dependent    COPD (chronic obstructive pulmonary disease)    DM (diabetes mellitus)    CAD (coronary artery disease)    GERD (gastroesophageal reflux disease)    HLD (hyperlipidemia)    Insomnia    Polyarthritis    CHF (congestive heart failure)    HTN (hypertension)    Mood disorder          PLAN:  HPI:  60 yo M, from NH, active smoker PMHx of CAD, ?CHF, COPD, peripheral neuropathy, GERD, HTN, HLD, obesity, polyarthritis, Pulmonary HTN, TIA, Vit D Def came with chief complain of shortness of breadth . As per ED triage pt BIAPAP machine was malfunctioning in NH. He used BIPAP at night in NH. Pt is a mild historian and unable to provide detailed hx due to BIPAP and lethargic in the setting of CO2 retention. On assessment he is lethargic but arousable and follows simple commands. Pt stated that he came to the hospital because he is having shortness of breadth for one month. He denied any chest pain, N/V/D.     Of note Pt non complaint to his BIPAP use . In Aug 2021 he was intubated for COPD exacerbation. In Nov 2021 again admitted for COPD exacerbation.    ED course: Pt has increase work of breathing. ABG shows CO2 125, PH 7.18 obtained on 4lit NC . After using bipap repeat blood gas on 3 lit NC shows Ph 7.3 , CO2 >125.   (09 Mar 2022 16:38)      - COPD EXACERBATION, HYPOXIC & HYPERCAPNOEIC RESPIRATORY FAILURE -  ON IV METHYLPREDNISOLONE - S/P EXTUBATION ON 03/11/22 , O2 SUPPLEMENTS,  ICU F/UP IS IN PROGRESS - PATIENT IS TOLERATING WELL     - METABOLIC ENCEPHALOPATHY - RESOLVING     - GI AND DVT PROPHYLAXIS    - DR. REFUGIO CABELLO

## 2022-03-13 ENCOUNTER — TRANSCRIPTION ENCOUNTER (OUTPATIENT)
Age: 62
End: 2022-03-13

## 2022-03-13 DIAGNOSIS — I10 ESSENTIAL (PRIMARY) HYPERTENSION: ICD-10-CM

## 2022-03-13 DIAGNOSIS — F41.9 ANXIETY DISORDER, UNSPECIFIED: ICD-10-CM

## 2022-03-13 DIAGNOSIS — J96.21 ACUTE AND CHRONIC RESPIRATORY FAILURE WITH HYPOXIA: ICD-10-CM

## 2022-03-13 DIAGNOSIS — M54.9 DORSALGIA, UNSPECIFIED: ICD-10-CM

## 2022-03-13 DIAGNOSIS — Z71.89 OTHER SPECIFIED COUNSELING: ICD-10-CM

## 2022-03-13 DIAGNOSIS — R63.8 OTHER SYMPTOMS AND SIGNS CONCERNING FOOD AND FLUID INTAKE: ICD-10-CM

## 2022-03-13 LAB
ANION GAP SERPL CALC-SCNC: 2 MMOL/L — LOW (ref 5–17)
BASOPHILS # BLD AUTO: 0.01 K/UL — SIGNIFICANT CHANGE UP (ref 0–0.2)
BASOPHILS NFR BLD AUTO: 0.1 % — SIGNIFICANT CHANGE UP (ref 0–2)
BUN SERPL-MCNC: 17 MG/DL — SIGNIFICANT CHANGE UP (ref 7–18)
CALCIUM SERPL-MCNC: 8.5 MG/DL — SIGNIFICANT CHANGE UP (ref 8.4–10.5)
CHLORIDE SERPL-SCNC: 99 MMOL/L — SIGNIFICANT CHANGE UP (ref 96–108)
CO2 SERPL-SCNC: 38 MMOL/L — HIGH (ref 22–31)
CREAT SERPL-MCNC: 0.44 MG/DL — LOW (ref 0.5–1.3)
EGFR: 122 ML/MIN/1.73M2 — SIGNIFICANT CHANGE UP
EOSINOPHIL # BLD AUTO: 0.05 K/UL — SIGNIFICANT CHANGE UP (ref 0–0.5)
EOSINOPHIL NFR BLD AUTO: 0.5 % — SIGNIFICANT CHANGE UP (ref 0–6)
GLUCOSE SERPL-MCNC: 136 MG/DL — HIGH (ref 70–99)
HCT VFR BLD CALC: 40.2 % — SIGNIFICANT CHANGE UP (ref 39–50)
HGB BLD-MCNC: 12.2 G/DL — LOW (ref 13–17)
IMM GRANULOCYTES NFR BLD AUTO: 0.4 % — SIGNIFICANT CHANGE UP (ref 0–1.5)
LYMPHOCYTES # BLD AUTO: 1.58 K/UL — SIGNIFICANT CHANGE UP (ref 1–3.3)
LYMPHOCYTES # BLD AUTO: 16.1 % — SIGNIFICANT CHANGE UP (ref 13–44)
MAGNESIUM SERPL-MCNC: 2 MG/DL — SIGNIFICANT CHANGE UP (ref 1.6–2.6)
MCHC RBC-ENTMCNC: 28.6 PG — SIGNIFICANT CHANGE UP (ref 27–34)
MCHC RBC-ENTMCNC: 30.3 GM/DL — LOW (ref 32–36)
MCV RBC AUTO: 94.1 FL — SIGNIFICANT CHANGE UP (ref 80–100)
MONOCYTES # BLD AUTO: 0.93 K/UL — HIGH (ref 0–0.9)
MONOCYTES NFR BLD AUTO: 9.5 % — SIGNIFICANT CHANGE UP (ref 2–14)
NEUTROPHILS # BLD AUTO: 7.21 K/UL — SIGNIFICANT CHANGE UP (ref 1.8–7.4)
NEUTROPHILS NFR BLD AUTO: 73.4 % — SIGNIFICANT CHANGE UP (ref 43–77)
NRBC # BLD: 0 /100 WBCS — SIGNIFICANT CHANGE UP (ref 0–0)
PHOSPHATE SERPL-MCNC: 3.4 MG/DL — SIGNIFICANT CHANGE UP (ref 2.5–4.5)
PLATELET # BLD AUTO: 142 K/UL — LOW (ref 150–400)
POTASSIUM SERPL-MCNC: 3.9 MMOL/L — SIGNIFICANT CHANGE UP (ref 3.5–5.3)
POTASSIUM SERPL-SCNC: 3.9 MMOL/L — SIGNIFICANT CHANGE UP (ref 3.5–5.3)
RBC # BLD: 4.27 M/UL — SIGNIFICANT CHANGE UP (ref 4.2–5.8)
RBC # FLD: 13.7 % — SIGNIFICANT CHANGE UP (ref 10.3–14.5)
SODIUM SERPL-SCNC: 139 MMOL/L — SIGNIFICANT CHANGE UP (ref 135–145)
WBC # BLD: 9.82 K/UL — SIGNIFICANT CHANGE UP (ref 3.8–10.5)
WBC # FLD AUTO: 9.82 K/UL — SIGNIFICANT CHANGE UP (ref 3.8–10.5)

## 2022-03-13 RX ORDER — OXYCODONE AND ACETAMINOPHEN 5; 325 MG/1; MG/1
1 TABLET ORAL EVERY 6 HOURS
Refills: 0 | Status: DISCONTINUED | OUTPATIENT
Start: 2022-03-13 | End: 2022-03-15

## 2022-03-13 RX ORDER — LATANOPROST 0.05 MG/ML
1 SOLUTION/ DROPS OPHTHALMIC; TOPICAL AT BEDTIME
Refills: 0 | Status: DISCONTINUED | OUTPATIENT
Start: 2022-03-13 | End: 2022-03-15

## 2022-03-13 RX ORDER — SENNA PLUS 8.6 MG/1
2 TABLET ORAL AT BEDTIME
Refills: 0 | Status: DISCONTINUED | OUTPATIENT
Start: 2022-03-13 | End: 2022-03-15

## 2022-03-13 RX ORDER — INSULIN LISPRO 100/ML
VIAL (ML) SUBCUTANEOUS
Refills: 0 | Status: DISCONTINUED | OUTPATIENT
Start: 2022-03-13 | End: 2022-03-15

## 2022-03-13 RX ADMIN — CHLORHEXIDINE GLUCONATE 1 APPLICATION(S): 213 SOLUTION TOPICAL at 05:18

## 2022-03-13 RX ADMIN — FENTANYL CITRATE 25 MICROGRAM(S): 50 INJECTION INTRAVENOUS at 10:31

## 2022-03-13 RX ADMIN — Medication 3: at 17:16

## 2022-03-13 RX ADMIN — Medication 3 UNIT(S): at 17:15

## 2022-03-13 RX ADMIN — Medication 1 PUFF(S): at 05:05

## 2022-03-13 RX ADMIN — QUETIAPINE FUMARATE 25 MILLIGRAM(S): 200 TABLET, FILM COATED ORAL at 12:18

## 2022-03-13 RX ADMIN — Medication 3 UNIT(S): at 12:06

## 2022-03-13 RX ADMIN — Medication 1 PUFF(S): at 21:38

## 2022-03-13 RX ADMIN — ALBUTEROL 2 PUFF(S): 90 AEROSOL, METERED ORAL at 21:38

## 2022-03-13 RX ADMIN — Medication 40 MILLIGRAM(S): at 17:16

## 2022-03-13 RX ADMIN — Medication 3: at 12:07

## 2022-03-13 RX ADMIN — Medication 0.5 MILLIGRAM(S): at 05:17

## 2022-03-13 RX ADMIN — ENOXAPARIN SODIUM 40 MILLIGRAM(S): 100 INJECTION SUBCUTANEOUS at 05:18

## 2022-03-13 RX ADMIN — Medication 3 UNIT(S): at 07:44

## 2022-03-13 RX ADMIN — ALBUTEROL 2 PUFF(S): 90 AEROSOL, METERED ORAL at 05:05

## 2022-03-13 RX ADMIN — Medication 0.5 MILLIGRAM(S): at 21:40

## 2022-03-13 RX ADMIN — LATANOPROST 1 DROP(S): 0.05 SOLUTION/ DROPS OPHTHALMIC; TOPICAL at 21:34

## 2022-03-13 RX ADMIN — ALBUTEROL 2 PUFF(S): 90 AEROSOL, METERED ORAL at 08:13

## 2022-03-13 RX ADMIN — FENTANYL CITRATE 25 MICROGRAM(S): 50 INJECTION INTRAVENOUS at 10:46

## 2022-03-13 RX ADMIN — Medication 8: at 22:40

## 2022-03-13 RX ADMIN — Medication 1 PUFF(S): at 08:13

## 2022-03-13 RX ADMIN — Medication 150 MILLIGRAM(S): at 21:33

## 2022-03-13 RX ADMIN — Medication 81 MILLIGRAM(S): at 12:18

## 2022-03-13 RX ADMIN — INSULIN GLARGINE 8 UNIT(S): 100 INJECTION, SOLUTION SUBCUTANEOUS at 21:37

## 2022-03-13 RX ADMIN — Medication 40 MILLIGRAM(S): at 05:18

## 2022-03-13 NOTE — PROGRESS NOTE ADULT - SUBJECTIVE AND OBJECTIVE BOX
Patient is a 61y old  Male who presents with a chief complaint of COPD exacerbation (13 Mar 2022 00:02)    PATIENT IS SEEN AND EXAMINED IN MEDICAL FLOOR.    JAMILA [    ]    MT [   ]      GT [   ]    ALLERGIES:  No Known Allergies      Daily     Daily     VITALS:    Vital Signs Last 24 Hrs  T(C): 36.7 (13 Mar 2022 07:00), Max: 36.7 (13 Mar 2022 07:00)  T(F): 98.1 (13 Mar 2022 07:00), Max: 98.1 (13 Mar 2022 07:00)  HR: 90 (13 Mar 2022 11:00) (87 - 116)  BP: 128/74 (13 Mar 2022 11:00) (112/73 - 161/81)  BP(mean): 87 (13 Mar 2022 11:00) (75 - 100)  RR: 17 (13 Mar 2022 11:00) (0 - 35)  SpO2: 91% (13 Mar 2022 11:00) (75% - 97%)    LABS:    CBC Full  -  ( 13 Mar 2022 04:15 )  WBC Count : 9.82 K/uL  RBC Count : 4.27 M/uL  Hemoglobin : 12.2 g/dL  Hematocrit : 40.2 %  Platelet Count - Automated : 142 K/uL  Mean Cell Volume : 94.1 fl  Mean Cell Hemoglobin : 28.6 pg  Mean Cell Hemoglobin Concentration : 30.3 gm/dL  Auto Neutrophil # : 7.21 K/uL  Auto Lymphocyte # : 1.58 K/uL  Auto Monocyte # : 0.93 K/uL  Auto Eosinophil # : 0.05 K/uL  Auto Basophil # : 0.01 K/uL  Auto Neutrophil % : 73.4 %  Auto Lymphocyte % : 16.1 %  Auto Monocyte % : 9.5 %  Auto Eosinophil % : 0.5 %  Auto Basophil % : 0.1 %      03-13    139  |  99  |  17  ----------------------------<  136<H>  3.9   |  38<H>  |  0.44<L>    Ca    8.5      13 Mar 2022 04:15  Phos  3.4     03-13  Mg     2.0     03-13    TPro  6.1  /  Alb  2.9<L>  /  TBili  0.6  /  DBili  x   /  AST  13  /  ALT  20  /  AlkPhos  52  03-12    CAPILLARY BLOOD GLUCOSE      POCT Blood Glucose.: 192 mg/dL (12 Mar 2022 16:37)  POCT Blood Glucose.: 209 mg/dL (12 Mar 2022 11:16)        LIVER FUNCTIONS - ( 12 Mar 2022 04:47 )  Alb: 2.9 g/dL / Pro: 6.1 g/dL / ALK PHOS: 52 U/L / ALT: 20 U/L DA / AST: 13 U/L / GGT: x           Creatinine Trend: 0.44<--, 0.49<--, 0.70<--, 0.62<--, 0.63<--, 0.53<--  I&O's Summary    12 Mar 2022 06:01  -  13 Mar 2022 07:00  --------------------------------------------------------  IN: 360 mL / OUT: 1825 mL / NET: -1465 mL    13 Mar 2022 07:01  -  13 Mar 2022 11:07  --------------------------------------------------------  IN: 0 mL / OUT: 205 mL / NET: -205 mL        ABG - ( 11 Mar 2022 12:43 )  pH, Arterial: 7.44  pH, Blood: x     /  pCO2: 69    /  pO2: 74    / HCO3: 47    / Base Excess: 18.8  /  SaO2: 96                      MEDICATIONS:    MEDICATIONS  (STANDING):  ALBUTerol    90 MICROgram(s) HFA Inhaler 2 Puff(s) Inhalation every 6 hours  aspirin enteric coated 81 milliGRAM(s) Oral daily  benzocaine 15 mG/menthol 3.6 mG Lozenge 1 Lozenge Oral two times a day  chlorhexidine 2% Cloths 1 Application(s) Topical <User Schedule>  enoxaparin Injectable 40 milliGRAM(s) SubCutaneous every 24 hours  insulin glargine Injectable (LANTUS) 8 Unit(s) SubCutaneous at bedtime  insulin lispro (ADMELOG) corrective regimen sliding scale   SubCutaneous three times a day before meals  insulin lispro Injectable (ADMELOG) 3 Unit(s) SubCutaneous three times a day before meals  ipratropium 17 MICROgram(s) HFA Inhaler 1 Puff(s) Inhalation every 6 hours  methylPREDNISolone sodium succinate Injectable 40 milliGRAM(s) IV Push every 12 hours  QUEtiapine 25 milliGRAM(s) Oral daily  senna 2 Tablet(s) Oral at bedtime  traZODone 150 milliGRAM(s) Oral at bedtime      MEDICATIONS  (PRN):  ALPRAZolam 0.5 milliGRAM(s) Oral two times a day PRN Agitation/anxiety  fentaNYL    Injectable 25 MICROGram(s) IV Push every 4 hours PRN Moderate Pain (4 - 6)        REVIEW OF SYSTEMS:                           ALL ROS DONE [ X   ]      CONSTITUTIONAL:  LETHARGIC [   ], FEVER [   ], UNRESPONSIVE [   ]  CVS:  CP  [   ], SOB, [   ], PALPITATIONS [   ], DIZZYNESS [   ]  RS: COUGH [   ], SPUTUM [   ]  GI: ABDOMINAL PAIN [   ], NAUSEA [   ], VOMITINGS [   ], DIARRHEA [   ], CONSTIPATION [   ]  :  DYSURIA [   ], NOCTURIA [   ], INCREASED FREQUENCY [   ], DRIBLING [   ],  SKELETAL: PAINFUL JOINTS [   ], SWOLLEN JOINTS [   ], NECK ACHE [   ], LOW BACK ACHE [   ],  SKIN : ULCERS [   ], RASH [   ], ITCHING [   ]  CNS: HEAD ACHE [   ], DOUBLE VISION [   ], BLURRED VISION [   ], AMS / CONFUSION [   ], SEIZURES [   ], WEAKNESS [   ],TINGLING / NUMBNESS [   ]          PHYSICAL EXAMINATION:    GENERAL APPEARANCE: NO DISTRESS  HEENT:  NO PALLOR, NO  JVD,  NO   NODES, NECK SUPPLE    ORAL ET + , ORAL GT +  CVS: S1 +, S2 +,   RS: AEEB,  OCCASIONAL  RALES +,   B/L RONCHI +  ABD: SOFT, NT, NO, BS +                                                           AMOR +  EXT: PE ++  SKIN: WARM,   SKELETAL:  ROM REDUCED AT CERVICAL & LS SPINE   CNS:  AAO X 2   , NO  DEFICITS        RADIOLOGY :    < from: Xray Chest 1 View-PORTABLE IMMEDIATE (Xray Chest 1 View-PORTABLE IMMEDIATE .) (03.10.22 @ 17:11) >    IMPRESSION: Improved right base infiltrate. COPD NG tube. No bowel   obstruction.    < end of copied text >        ASSESSMENT :     Shortness of breath    COPD (chronic obstructive pulmonary disease)    Stented coronary artery    HLD (hyperlipidemia)    Pulmonary HTN    Type 2 diabetes mellitus without complication, with long-term current use of insulin    Coronary artery disease involving native coronary artery of native heart without angina pectoris    Bipolar 1 disorder    Smoker    Gastroesophageal reflux disease, esophagitis presence not specified    Oxygen dependent    COPD (chronic obstructive pulmonary disease)    DM (diabetes mellitus)    CAD (coronary artery disease)    GERD (gastroesophageal reflux disease)    HLD (hyperlipidemia)    Insomnia    Polyarthritis    CHF (congestive heart failure)    HTN (hypertension)    Mood disorder          PLAN:  HPI:  60 yo M, from NH, active smoker PMHx of CAD, ?CHF, COPD, peripheral neuropathy, GERD, HTN, HLD, obesity, polyarthritis, Pulmonary HTN, TIA, Vit D Def came with chief complain of shortness of breadth . As per ED triage pt BIAPAP machine was malfunctioning in NH. He used BIPAP at night in NH. Pt is a mild historian and unable to provide detailed hx due to BIPAP and lethargic in the setting of CO2 retention. On assessment he is lethargic but arousable and follows simple commands. Pt stated that he came to the hospital because he is having shortness of breadth for one month. He denied any chest pain, N/V/D.     Of note Pt non complaint to his BIPAP use . In Aug 2021 he was intubated for COPD exacerbation. In Nov 2021 again admitted for COPD exacerbation.    ED course: Pt has increase work of breathing. ABG shows CO2 125, PH 7.18 obtained on 4lit NC . After using bipap repeat blood gas on 3 lit NC shows Ph 7.3 , CO2 >125.   (09 Mar 2022 16:38)      - COPD EXACERBATION, HYPOXIC & HYPERCAPNOEIC RESPIRATORY FAILURE -  ON IV METHYLPREDNISOLONE - S/P EXTUBATION ON 03/11/22 , O2 SUPPLEMENTS,  ICU F/UP IS IN PROGRESS - PATIENT IS TOLERATING WELL     - METABOLIC ENCEPHALOPATHY - RESOLVING     - GI AND DVT PROPHYLAXIS    - DR. REFUGIO CABELLO  Patient is a 61y old  Male who presents with a chief complaint of COPD exacerbation (13 Mar 2022 00:02)    PATIENT IS SEEN AND EXAMINED IN MEDICAL FLOOR.    JAMILA [    ]    MT [   ]      GT [   ]    ALLERGIES:  No Known Allergies      Daily     Daily     VITALS:    Vital Signs Last 24 Hrs  T(C): 36.7 (13 Mar 2022 07:00), Max: 36.7 (13 Mar 2022 07:00)  T(F): 98.1 (13 Mar 2022 07:00), Max: 98.1 (13 Mar 2022 07:00)  HR: 90 (13 Mar 2022 11:00) (87 - 116)  BP: 128/74 (13 Mar 2022 11:00) (112/73 - 161/81)  BP(mean): 87 (13 Mar 2022 11:00) (75 - 100)  RR: 17 (13 Mar 2022 11:00) (0 - 35)  SpO2: 91% (13 Mar 2022 11:00) (75% - 97%)    LABS:    CBC Full  -  ( 13 Mar 2022 04:15 )  WBC Count : 9.82 K/uL  RBC Count : 4.27 M/uL  Hemoglobin : 12.2 g/dL  Hematocrit : 40.2 %  Platelet Count - Automated : 142 K/uL  Mean Cell Volume : 94.1 fl  Mean Cell Hemoglobin : 28.6 pg  Mean Cell Hemoglobin Concentration : 30.3 gm/dL  Auto Neutrophil # : 7.21 K/uL  Auto Lymphocyte # : 1.58 K/uL  Auto Monocyte # : 0.93 K/uL  Auto Eosinophil # : 0.05 K/uL  Auto Basophil # : 0.01 K/uL  Auto Neutrophil % : 73.4 %  Auto Lymphocyte % : 16.1 %  Auto Monocyte % : 9.5 %  Auto Eosinophil % : 0.5 %  Auto Basophil % : 0.1 %      03-13    139  |  99  |  17  ----------------------------<  136<H>  3.9   |  38<H>  |  0.44<L>    Ca    8.5      13 Mar 2022 04:15  Phos  3.4     03-13  Mg     2.0     03-13    TPro  6.1  /  Alb  2.9<L>  /  TBili  0.6  /  DBili  x   /  AST  13  /  ALT  20  /  AlkPhos  52  03-12    CAPILLARY BLOOD GLUCOSE      POCT Blood Glucose.: 192 mg/dL (12 Mar 2022 16:37)  POCT Blood Glucose.: 209 mg/dL (12 Mar 2022 11:16)        LIVER FUNCTIONS - ( 12 Mar 2022 04:47 )  Alb: 2.9 g/dL / Pro: 6.1 g/dL / ALK PHOS: 52 U/L / ALT: 20 U/L DA / AST: 13 U/L / GGT: x           Creatinine Trend: 0.44<--, 0.49<--, 0.70<--, 0.62<--, 0.63<--, 0.53<--  I&O's Summary    12 Mar 2022 06:01  -  13 Mar 2022 07:00  --------------------------------------------------------  IN: 360 mL / OUT: 1825 mL / NET: -1465 mL    13 Mar 2022 07:01  -  13 Mar 2022 11:07  --------------------------------------------------------  IN: 0 mL / OUT: 205 mL / NET: -205 mL        ABG - ( 11 Mar 2022 12:43 )  pH, Arterial: 7.44  pH, Blood: x     /  pCO2: 69    /  pO2: 74    / HCO3: 47    / Base Excess: 18.8  /  SaO2: 96                      MEDICATIONS:    MEDICATIONS  (STANDING):  ALBUTerol    90 MICROgram(s) HFA Inhaler 2 Puff(s) Inhalation every 6 hours  aspirin enteric coated 81 milliGRAM(s) Oral daily  benzocaine 15 mG/menthol 3.6 mG Lozenge 1 Lozenge Oral two times a day  chlorhexidine 2% Cloths 1 Application(s) Topical <User Schedule>  enoxaparin Injectable 40 milliGRAM(s) SubCutaneous every 24 hours  insulin glargine Injectable (LANTUS) 8 Unit(s) SubCutaneous at bedtime  insulin lispro (ADMELOG) corrective regimen sliding scale   SubCutaneous three times a day before meals  insulin lispro Injectable (ADMELOG) 3 Unit(s) SubCutaneous three times a day before meals  ipratropium 17 MICROgram(s) HFA Inhaler 1 Puff(s) Inhalation every 6 hours  methylPREDNISolone sodium succinate Injectable 40 milliGRAM(s) IV Push every 12 hours  QUEtiapine 25 milliGRAM(s) Oral daily  senna 2 Tablet(s) Oral at bedtime  traZODone 150 milliGRAM(s) Oral at bedtime      MEDICATIONS  (PRN):  ALPRAZolam 0.5 milliGRAM(s) Oral two times a day PRN Agitation/anxiety  fentaNYL    Injectable 25 MICROGram(s) IV Push every 4 hours PRN Moderate Pain (4 - 6)        REVIEW OF SYSTEMS:                           ALL ROS DONE [ X   ]      CONSTITUTIONAL:  LETHARGIC [   ], FEVER [   ], UNRESPONSIVE [   ]  CVS:  CP  [   ], SOB, [   ], PALPITATIONS [   ], DIZZYNESS [   ]  RS: COUGH [   ], SPUTUM [   ]  GI: ABDOMINAL PAIN [   ], NAUSEA [   ], VOMITINGS [   ], DIARRHEA [   ], CONSTIPATION [   ]  :  DYSURIA [   ], NOCTURIA [   ], INCREASED FREQUENCY [   ], DRIBLING [   ],  SKELETAL: PAINFUL JOINTS [   ], SWOLLEN JOINTS [   ], NECK ACHE [   ], LOW BACK ACHE [   ],  SKIN : ULCERS [   ], RASH [   ], ITCHING [   ]  CNS: HEAD ACHE [   ], DOUBLE VISION [   ], BLURRED VISION [   ], AMS / CONFUSION [   ], SEIZURES [   ], WEAKNESS [   ],TINGLING / NUMBNESS [   ]          PHYSICAL EXAMINATION:    GENERAL APPEARANCE: NO DISTRESS  HEENT:  NO PALLOR, NO  JVD,  NO   NODES, NECK SUPPLE    ORAL ET + , ORAL GT +  CVS: S1 +, S2 +,   RS: AEEB,  OCCASIONAL  RALES +,   B/L RONCHI +  ABD: SOFT, NT, NO, BS +                                                           AMOR +  EXT: PE ++  SKIN: WARM,   SKELETAL:  ROM REDUCED AT CERVICAL & LS SPINE   CNS:  AAO X 2   , NO  DEFICITS        RADIOLOGY :    < from: Xray Chest 1 View-PORTABLE IMMEDIATE (Xray Chest 1 View-PORTABLE IMMEDIATE .) (03.10.22 @ 17:11) >    IMPRESSION: Improved right base infiltrate. COPD NG tube. No bowel   obstruction.    < end of copied text >        ASSESSMENT :     Shortness of breath    COPD (chronic obstructive pulmonary disease)    Stented coronary artery    HLD (hyperlipidemia)    Pulmonary HTN    Type 2 diabetes mellitus without complication, with long-term current use of insulin    Coronary artery disease involving native coronary artery of native heart without angina pectoris    Bipolar 1 disorder    Smoker    Gastroesophageal reflux disease, esophagitis presence not specified    Oxygen dependent    COPD (chronic obstructive pulmonary disease)    DM (diabetes mellitus)    CAD (coronary artery disease)    GERD (gastroesophageal reflux disease)    HLD (hyperlipidemia)    Insomnia    Polyarthritis    CHF (congestive heart failure)    HTN (hypertension)    Mood disorder          PLAN:  HPI:  62 yo M, from NH, active smoker PMHx of CAD, ?CHF, COPD, peripheral neuropathy, GERD, HTN, HLD, obesity, polyarthritis, Pulmonary HTN, TIA, Vit D Def came with chief complain of shortness of breadth . As per ED triage pt BIAPAP machine was malfunctioning in NH. He used BIPAP at night in NH. Pt is a mild historian and unable to provide detailed hx due to BIPAP and lethargic in the setting of CO2 retention. On assessment he is lethargic but arousable and follows simple commands. Pt stated that he came to the hospital because he is having shortness of breadth for one month. He denied any chest pain, N/V/D.     Of note Pt non complaint to his BIPAP use . In Aug 2021 he was intubated for COPD exacerbation. In Nov 2021 again admitted for COPD exacerbation.    ED course: Pt has increase work of breathing. ABG shows CO2 125, PH 7.18 obtained on 4lit NC . After using bipap repeat blood gas on 3 lit NC shows Ph 7.3 , CO2 >125.   (09 Mar 2022 16:38)    - DC PLAN IN AM ( ON TAPERING DOSES OF PREDNISONE. OBTAIN PT & OT     - DOWN GRADED TO FLOOR FROM ICU. BEING OBSERVED IN ACUTE CARE UNIT    - COPD EXACERBATION, HYPOXIC & HYPERCAPNOEIC RESPIRATORY FAILURE -  ON IV METHYLPREDNISOLONE - S/P EXTUBATION ON 03/11/22 , O2 SUPPLEMENTS,  ICU F/UP IS IN PROGRESS - PATIENT IS TOLERATING WELL     - METABOLIC ENCEPHALOPATHY - RESOLVING     - GI AND DVT PROPHYLAXIS    - DR. REFUGIO CABELLO

## 2022-03-13 NOTE — CHART NOTE - [email or web address]_CORE
Attempted to contact the patient's sister/HCP, Katherine Merrill ( [phone]) twice without success. Unable to leave message as voicemail full and not accepting messages.

## 2022-03-13 NOTE — PROGRESS NOTE ADULT - SUBJECTIVE AND OBJECTIVE BOX
DEVIKA CARBALLO    SCU progress note    INTERVAL HPI/OVERNIGHT EVENTS: Transferred from ICU this morning    DNR [ ]   DNI  [  ] FULL CODE    Covid - 19 PCR: 3/9/22 negative    The 4Ms    What Matters Most: see GOC  Age appropriate Medications/Screen for High Risk Medication: Yes  Mentation: see CAM below  Mobility: defer to physical exam    The Confusion Assessment Method (CAM) Diagnostic Algorithm     1: Acute Onset or Fluctuating Course  - Is there evidence of an acute change in mental status from the patient’s baseline? Did the (abnormal) behavior  fluctuate during the day, that is, tend to come and go, or increase and decrease in severity?  [ ] YES [ X] NO     2: Inattention  - Did the patient have difficulty focusing attention, being easily distractible, or having difficulty keeping track of what was being said?  [ ] YES [X ] NO     3: Disorganized thinking  -Was the patient’s thinking disorganized or incoherent, such as rambling or irrelevant conversation, unclear or illogical flow of ideas, or unpredictable switching from subject to subject?  [ ] YES [X ] NO    4: Altered Level of consciousness?  [ ] YES [X ] NO    The diagnosis of delirium by CAM requires the presence of features 1 and 2 and either 3 or 4.    PRESSORS: [ ] YES [X ] NO    Cardiovascular:  Heart Failure  Acute   Acute on Chronic  Chronic         Pulmonary:  ALBUTerol    90 MICROgram(s) HFA Inhaler 2 Puff(s) Inhalation every 6 hours  ipratropium 17 MICROgram(s) HFA Inhaler 1 Puff(s) Inhalation every 6 hours    Hematalogic:  aspirin enteric coated 81 milliGRAM(s) Oral daily  enoxaparin Injectable 40 milliGRAM(s) SubCutaneous every 24 hours    Other:  ALPRAZolam 0.5 milliGRAM(s) Oral two times a day PRN  benzocaine 15 mG/menthol 3.6 mG Lozenge 1 Lozenge Oral two times a day  chlorhexidine 2% Cloths 1 Application(s) Topical <User Schedule>  fentaNYL    Injectable 25 MICROGram(s) IV Push every 4 hours PRN  insulin glargine Injectable (LANTUS) 8 Unit(s) SubCutaneous at bedtime  insulin lispro (ADMELOG) corrective regimen sliding scale   SubCutaneous three times a day before meals  insulin lispro Injectable (ADMELOG) 3 Unit(s) SubCutaneous three times a day before meals  methylPREDNISolone sodium succinate Injectable 40 milliGRAM(s) IV Push every 12 hours  QUEtiapine 25 milliGRAM(s) Oral daily  senna 2 Tablet(s) Oral at bedtime  traZODone 150 milliGRAM(s) Oral at bedtime    ALBUTerol    90 MICROgram(s) HFA Inhaler 2 Puff(s) Inhalation every 6 hours  ALPRAZolam 0.5 milliGRAM(s) Oral two times a day PRN  aspirin enteric coated 81 milliGRAM(s) Oral daily  benzocaine 15 mG/menthol 3.6 mG Lozenge 1 Lozenge Oral two times a day  chlorhexidine 2% Cloths 1 Application(s) Topical <User Schedule>  enoxaparin Injectable 40 milliGRAM(s) SubCutaneous every 24 hours  fentaNYL    Injectable 25 MICROGram(s) IV Push every 4 hours PRN  insulin glargine Injectable (LANTUS) 8 Unit(s) SubCutaneous at bedtime  insulin lispro (ADMELOG) corrective regimen sliding scale   SubCutaneous three times a day before meals  insulin lispro Injectable (ADMELOG) 3 Unit(s) SubCutaneous three times a day before meals  ipratropium 17 MICROgram(s) HFA Inhaler 1 Puff(s) Inhalation every 6 hours  methylPREDNISolone sodium succinate Injectable 40 milliGRAM(s) IV Push every 12 hours  QUEtiapine 25 milliGRAM(s) Oral daily  senna 2 Tablet(s) Oral at bedtime  traZODone 150 milliGRAM(s) Oral at bedtime    Drug Dosing Weight  Height (cm): 172.7 (09 Mar 2022 10:55)  Weight (kg): 101.1 (09 Mar 2022 22:00)  BMI (kg/m2): 33.9 (09 Mar 2022 22:00)  BSA (m2): 2.14 (09 Mar 2022 22:00)    CENTRAL LINE: [ ] YES [ ] NO  LOCATION:   DATE INSERTED:  REMOVE: [ ] YES [ ] NO  EXPLAIN:    MT: [ ] YES [ ] NO    DATE INSERTED:  REMOVE:  [ ] YES [ ] NO  EXPLAIN:    PAST MEDICAL & SURGICAL HISTORY:  COPD (chronic obstructive pulmonary disease)  Oxygen 2-3L at home    Stented coronary artery  2017    HLD (hyperlipidemia)    Pulmonary HTN    Type 2 diabetes mellitus without complication, with long-term current use of insulin    Coronary artery disease involving native coronary artery of native heart without angina pectoris    Smoker    Gastroesophageal reflux disease, esophagitis presence not specified    Oxygen dependent    DM (diabetes mellitus)    CAD (coronary artery disease)    GERD (gastroesophageal reflux disease)    Insomnia    CHF (congestive heart failure)    HTN (hypertension)    Mood disorder    No significant past surgical history          ABG - ( 11 Mar 2022 12:43 )  pH, Arterial: 7.44  pH, Blood: x     /  pCO2: 69    /  pO2: 74    / HCO3: 47    / Base Excess: 18.8  /  SaO2: 96                    03-12 @ 06:01  -  03-13 @ 07:00  --------------------------------------------------------  IN: 360 mL / OUT: 1825 mL / NET: -1465 mL            PHYSICAL EXAM:    GENERAL: NAD, well-groomed, well-developed  HEAD:  Atraumatic, Normocephalic  EYES: EOMI, PERRL, conjunctiva and sclera clear  ENMT: Moist mucous membranes, Good dentition, No lesions  NECK: Supple, No JVD  NERVOUS SYSTEM:  Alert & Oriented X3, Good concentration; Motor Strength 5/5 B/L upper and lower extremities  CHEST/LUNG: Non-labored, regular, equal expansion bilaterally. No rales, rhonchi, wheezing, or rubs  HEART: Regular rate and rhythm; No murmurs, rubs, or gallops  ABDOMEN: Soft, Nontender, Nondistended; Bowel sounds present  EXTREMITIES:  2+ Peripheral Pulses, No clubbing, cyanosis, or edema  SKIN: No rashes or lesions      LABS:  CBC Full  -  ( 13 Mar 2022 04:15 )  WBC Count : 9.82 K/uL  RBC Count : 4.27 M/uL  Hemoglobin : 12.2 g/dL  Hematocrit : 40.2 %  Platelet Count - Automated : 142 K/uL  Mean Cell Volume : 94.1 fl  Mean Cell Hemoglobin : 28.6 pg  Mean Cell Hemoglobin Concentration : 30.3 gm/dL  Auto Neutrophil # : 7.21 K/uL  Auto Lymphocyte # : 1.58 K/uL  Auto Monocyte # : 0.93 K/uL  Auto Eosinophil # : 0.05 K/uL  Auto Basophil # : 0.01 K/uL  Auto Neutrophil % : 73.4 %  Auto Lymphocyte % : 16.1 %  Auto Monocyte % : 9.5 %  Auto Eosinophil % : 0.5 %  Auto Basophil % : 0.1 %    03-13    139  |  99  |  17  ----------------------------<  136<H>  3.9   |  38<H>  |  0.44<L>    Ca    8.5      13 Mar 2022 04:15  Phos  3.4     03-13  Mg     2.0     03-13    TPro  6.1  /  Alb  2.9<L>  /  TBili  0.6  /  DBili  x   /  AST  13  /  ALT  20  /  AlkPhos  52  03-12              [  ]  DVT Prophylaxis  [  ]  Nutrition, Brand, Rate         Goal Rate        Abnormal Nutritional Status -  Malnutrition   Cachexia      Morbid Obesity BMI >/=40    RADIOLOGY & ADDITIONAL STUDIES:  ***    < from: Xray Chest 1 View- PORTABLE-Routine (Xray Chest 1 View- PORTABLE-Routine in AM.) (03.11.22 @ 09:58) >  FINDINGS:  CATHETERS AND TUBES:  NG tube tip beyond GE junction.  ET tube tip above tracheal bifurcation.    PULMONARY: Mild bibasilar diffuse airspace disease and/or small   effusions...   No pneumothorax.    HEART/VASCULAR: The  heart is enlarged in transverse diameter. Enlarged   proximal pulmonary arteries. .    BONES: Visualized osseous structures are intact.    IMPRESSION:   NG tube tip beyond GE junction.  ET tube tip above tracheal bifurcation..  Cardiomegaly.  Suspect bibasilar mild diffuse airspace disease and/or small effusions.    < end of copied text >    < from: Xray Chest 1 View- PORTABLE-Urgent (Xray Chest 1 View- PORTABLE-Urgent .) (03.09.22 @ 13:36) >  INTERPRETATION:  Portable chest radiograph    CLINICAL INFORMATION: Dyspnea, shortness of breath.    TECHNIQUE:  Portable  AP chest radiograph.    COMPARISON: 11/13/2021 chest .    FINDINGS:  CATHETERS AND TUBES: None    PULMONARY: RIGHT hilar/Bibasilar diffuse airspace disease..   No pneumothorax.    HEART/VASCULAR: The  heart is enlarged in transverse diameter. .    BONES: Visualizedosseous structures are intact.    IMPRESSION:   Cardiomegaly.  Perihilar/Bibasilar diffuse airspace disease..    < end of copied text >    < from: Transthoracic Echocardiogram (03.10.22 @ 07:08) >  1. Normal mitral valve. No mitral regurgitation is noted.  2. Calcified trileaflet aortic valve with normal opening.  No aortic stenosis. No aortic valve regurgitation seen.  3. Normal aortic root.  4. Normal left atrium.  5. Mild septal left ventricular hypertrophy.  6. Hyperdynamic left ventricular systolic function (EF  >70%).  7. Normal right atrium.  8. Right ventricular enlargement with normal RV systolic  function (TAPSE 2.4 cm).  9. RV systolic pressure is moderately increased at  53 mm  Hg.  10. Normal tricuspid valve. Trace tricuspid regurgitation.  11. Pulmonic valve not well seen. Trace pulmonic  insufficiency is noted.  12. Trivial pericardial effusion is seen.    *** Compared with echocardiogram report of 7/6/2021, no  significant changes noted based on technically difficult  study.    < end of copied text >      Goals of Care Discussion with Family/Proxy/Other   - see note from 3/9/22     DEVIKA CARBALLO    SCU progress note    INTERVAL HPI/OVERNIGHT EVENTS: Transferred from ICU this morning    DNR [ ]   DNI  [  ] FULL CODE    Covid - 19 PCR: 3/9/22 negative    The 4Ms    What Matters Most: see GOC  Age appropriate Medications/Screen for High Risk Medication: Yes  Mentation: see CAM below  Mobility: defer to physical exam    The Confusion Assessment Method (CAM) Diagnostic Algorithm     1: Acute Onset or Fluctuating Course  - Is there evidence of an acute change in mental status from the patient’s baseline? Did the (abnormal) behavior  fluctuate during the day, that is, tend to come and go, or increase and decrease in severity?  [ ] YES [ X] NO     2: Inattention  - Did the patient have difficulty focusing attention, being easily distractible, or having difficulty keeping track of what was being said?  [ ] YES [X ] NO     3: Disorganized thinking  -Was the patient’s thinking disorganized or incoherent, such as rambling or irrelevant conversation, unclear or illogical flow of ideas, or unpredictable switching from subject to subject?  [ ] YES [X ] NO    4: Altered Level of consciousness?  [ ] YES [X ] NO    The diagnosis of delirium by CAM requires the presence of features 1 and 2 and either 3 or 4.    PRESSORS: [ ] YES [X ] NO    Cardiovascular:  Heart Failure  Acute   Acute on Chronic  Chronic         Pulmonary:  ALBUTerol    90 MICROgram(s) HFA Inhaler 2 Puff(s) Inhalation every 6 hours  ipratropium 17 MICROgram(s) HFA Inhaler 1 Puff(s) Inhalation every 6 hours    Hematalogic:  aspirin enteric coated 81 milliGRAM(s) Oral daily  enoxaparin Injectable 40 milliGRAM(s) SubCutaneous every 24 hours    Other:  ALPRAZolam 0.5 milliGRAM(s) Oral two times a day PRN  benzocaine 15 mG/menthol 3.6 mG Lozenge 1 Lozenge Oral two times a day  chlorhexidine 2% Cloths 1 Application(s) Topical <User Schedule>  fentaNYL    Injectable 25 MICROGram(s) IV Push every 4 hours PRN  insulin glargine Injectable (LANTUS) 8 Unit(s) SubCutaneous at bedtime  insulin lispro (ADMELOG) corrective regimen sliding scale   SubCutaneous three times a day before meals  insulin lispro Injectable (ADMELOG) 3 Unit(s) SubCutaneous three times a day before meals  methylPREDNISolone sodium succinate Injectable 40 milliGRAM(s) IV Push every 12 hours  QUEtiapine 25 milliGRAM(s) Oral daily  senna 2 Tablet(s) Oral at bedtime  traZODone 150 milliGRAM(s) Oral at bedtime    ALBUTerol    90 MICROgram(s) HFA Inhaler 2 Puff(s) Inhalation every 6 hours  ALPRAZolam 0.5 milliGRAM(s) Oral two times a day PRN  aspirin enteric coated 81 milliGRAM(s) Oral daily  benzocaine 15 mG/menthol 3.6 mG Lozenge 1 Lozenge Oral two times a day  chlorhexidine 2% Cloths 1 Application(s) Topical <User Schedule>  enoxaparin Injectable 40 milliGRAM(s) SubCutaneous every 24 hours  fentaNYL    Injectable 25 MICROGram(s) IV Push every 4 hours PRN  insulin glargine Injectable (LANTUS) 8 Unit(s) SubCutaneous at bedtime  insulin lispro (ADMELOG) corrective regimen sliding scale   SubCutaneous three times a day before meals  insulin lispro Injectable (ADMELOG) 3 Unit(s) SubCutaneous three times a day before meals  ipratropium 17 MICROgram(s) HFA Inhaler 1 Puff(s) Inhalation every 6 hours  methylPREDNISolone sodium succinate Injectable 40 milliGRAM(s) IV Push every 12 hours  QUEtiapine 25 milliGRAM(s) Oral daily  senna 2 Tablet(s) Oral at bedtime  traZODone 150 milliGRAM(s) Oral at bedtime    Drug Dosing Weight  Height (cm): 172.7 (09 Mar 2022 10:55)  Weight (kg): 101.1 (09 Mar 2022 22:00)  BMI (kg/m2): 33.9 (09 Mar 2022 22:00)  BSA (m2): 2.14 (09 Mar 2022 22:00)    CENTRAL LINE: [ ] YES [ ] NO  LOCATION:   DATE INSERTED:  REMOVE: [ ] YES [ ] NO  EXPLAIN:    MT: [ ] YES [ ] NO    DATE INSERTED:  REMOVE:  [ ] YES [ ] NO  EXPLAIN:    PAST MEDICAL & SURGICAL HISTORY:  COPD (chronic obstructive pulmonary disease)  Oxygen 2-3L at home    Stented coronary artery  2017    HLD (hyperlipidemia)    Pulmonary HTN    Type 2 diabetes mellitus without complication, with long-term current use of insulin    Coronary artery disease involving native coronary artery of native heart without angina pectoris    Smoker    Gastroesophageal reflux disease, esophagitis presence not specified    Oxygen dependent    DM (diabetes mellitus)    CAD (coronary artery disease)    GERD (gastroesophageal reflux disease)    Insomnia    CHF (congestive heart failure)    HTN (hypertension)    Mood disorder    No significant past surgical history          ABG - ( 11 Mar 2022 12:43 )  pH, Arterial: 7.44  pH, Blood: x     /  pCO2: 69    /  pO2: 74    / HCO3: 47    / Base Excess: 18.8  /  SaO2: 96                    03-12 @ 06:01  -  03-13 @ 07:00  --------------------------------------------------------  IN: 360 mL / OUT: 1825 mL / NET: -1465 mL            PHYSICAL EXAM:    GENERAL: NAD, well-groomed, well-developed  HEAD:  Atraumatic, Normocephalic  EYES: EOMI, PERRL, conjunctiva and sclera clear  ENMT: Moist mucous membranes, Good dentition, No lesions  NECK: Supple, No JVD  NERVOUS SYSTEM:  Alert & Oriented X3, Good concentration; Motor Strength 5/5 B/L upper and lower extremities  CHEST/LUNG: Non-labored, regular, equal expansion bilaterally. coarse breath sounds bilaterally with expiratory wheezing noted.   HEART: Regular rate and rhythm; No murmurs, rubs, or gallops  ABDOMEN: Soft, Nontender, Nondistended; Bowel sounds present  : mark catheter draining clear yellow urine  EXTREMITIES:  2+ Peripheral Pulses, No clubbing, cyanosis, or edema  SKIN: No rashes or lesions, scattered ecchymoses to bilateral upper arms.      LABS:  CBC Full  -  ( 13 Mar 2022 04:15 )  WBC Count : 9.82 K/uL  RBC Count : 4.27 M/uL  Hemoglobin : 12.2 g/dL  Hematocrit : 40.2 %  Platelet Count - Automated : 142 K/uL  Mean Cell Volume : 94.1 fl  Mean Cell Hemoglobin : 28.6 pg  Mean Cell Hemoglobin Concentration : 30.3 gm/dL  Auto Neutrophil # : 7.21 K/uL  Auto Lymphocyte # : 1.58 K/uL  Auto Monocyte # : 0.93 K/uL  Auto Eosinophil # : 0.05 K/uL  Auto Basophil # : 0.01 K/uL  Auto Neutrophil % : 73.4 %  Auto Lymphocyte % : 16.1 %  Auto Monocyte % : 9.5 %  Auto Eosinophil % : 0.5 %  Auto Basophil % : 0.1 %    03-13    139  |  99  |  17  ----------------------------<  136<H>  3.9   |  38<H>  |  0.44<L>    Ca    8.5      13 Mar 2022 04:15  Phos  3.4     03-13  Mg     2.0     03-13    TPro  6.1  /  Alb  2.9<L>  /  TBili  0.6  /  DBili  x   /  AST  13  /  ALT  20  /  AlkPhos  52  03-12              [  ]  DVT Prophylaxis  [  ]  Nutrition, Brand, Rate         Goal Rate        Abnormal Nutritional Status -  Malnutrition   Cachexia      Morbid Obesity BMI >/=40    RADIOLOGY & ADDITIONAL STUDIES:  ***    < from: Xray Chest 1 View- PORTABLE-Routine (Xray Chest 1 View- PORTABLE-Routine in AM.) (03.11.22 @ 09:58) >  FINDINGS:  CATHETERS AND TUBES:  NG tube tip beyond GE junction.  ET tube tip above tracheal bifurcation.    PULMONARY: Mild bibasilar diffuse airspace disease and/or small   effusions...   No pneumothorax.    HEART/VASCULAR: The  heart is enlarged in transverse diameter. Enlarged   proximal pulmonary arteries. .    BONES: Visualized osseous structures are intact.    IMPRESSION:   NG tube tip beyond GE junction.  ET tube tip above tracheal bifurcation..  Cardiomegaly.  Suspect bibasilar mild diffuse airspace disease and/or small effusions.    < end of copied text >    < from: Xray Chest 1 View- PORTABLE-Urgent (Xray Chest 1 View- PORTABLE-Urgent .) (03.09.22 @ 13:36) >  INTERPRETATION:  Portable chest radiograph    CLINICAL INFORMATION: Dyspnea, shortness of breath.    TECHNIQUE:  Portable  AP chest radiograph.    COMPARISON: 11/13/2021 chest .    FINDINGS:  CATHETERS AND TUBES: None    PULMONARY: RIGHT hilar/Bibasilar diffuse airspace disease..   No pneumothorax.    HEART/VASCULAR: The  heart is enlarged in transverse diameter. .    BONES: Visualizedosseous structures are intact.    IMPRESSION:   Cardiomegaly.  Perihilar/Bibasilar diffuse airspace disease..    < end of copied text >    < from: Transthoracic Echocardiogram (03.10.22 @ 07:08) >  1. Normal mitral valve. No mitral regurgitation is noted.  2. Calcified trileaflet aortic valve with normal opening.  No aortic stenosis. No aortic valve regurgitation seen.  3. Normal aortic root.  4. Normal left atrium.  5. Mild septal left ventricular hypertrophy.  6. Hyperdynamic left ventricular systolic function (EF  >70%).  7. Normal right atrium.  8. Right ventricular enlargement with normal RV systolic  function (TAPSE 2.4 cm).  9. RV systolic pressure is moderately increased at  53 mm  Hg.  10. Normal tricuspid valve. Trace tricuspid regurgitation.  11. Pulmonic valve not well seen. Trace pulmonic  insufficiency is noted.  12. Trivial pericardial effusion is seen.    *** Compared with echocardiogram report of 7/6/2021, no  significant changes noted based on technically difficult  study.    < end of copied text >      Goals of Care Discussion with Family/Proxy/Other   - see note from 3/9/22

## 2022-03-13 NOTE — DISCHARGE NOTE PROVIDER - NSDCMRMEDTOKEN_GEN_ALL_CORE_FT
ALPRAZolam 0.5 mg oral tablet: 1 tab(s) orally 2 times a day MDD:2  Artificial Tears ophthalmic solution: 1 dose(s) to each affected eye once a day  aspirin 81 mg oral tablet: 1 tab(s) orally once a day  DuoNeb 0.5 mg-2.5 mg/3 mL inhalation solution: 1 scoop(s) inhaled 4 times a day  furosemide 20 mg oral tablet: 1 tab(s) orally once a day  gabapentin 100 mg oral capsule: 1 cap(s) orally 3 times a day  latanoprost 0.005% ophthalmic solution: 1 drop(s) to both  eye once a day (in the evening)  Melatonin 3 mg oral tablet: 1 tab(s) orally once a day (at bedtime)  MiraLax oral powder for reconstitution:   multivitamin with minerals:   NovoLIN 70/30: 44 unit(s) subcutaneous 2 times a day  44 units at 8am and 34 units at 5pm   NovoLOG 100 units/mL subcutaneous solution: unit(s) subcutaneous 2 times a day  Sliding scale   201-250 4 units  251-300 6 units   301-350  351-400 10 units    nystatin 100,000 units/mL oral suspension: 5 milliliter(s) orally 3 times a day  Percocet 10/325 oral tablet: 1 tab(s) orally every 6 hours, As Needed  Proventil 2.5 mg/3 mL (0.083%) inhalation solution: 2 puff(s) inhaled every 6 hours, As Needed  Saline Mist 0.65% nasal spray (obsolete): 2 spray(s) nasal 3 times a day  senna oral tablet: 2 tab(s) orally once a day (at bedtime)  simethicone 80 mg oral tablet: 1 tab(s) orally 4 times a day (after meals and at bedtime)  Symbicort 160 mcg-4.5 mcg/inh inhalation aerosol: 2 puff(s) inhaled 2 times a day  traZODone 150 mg oral tablet: 1 tab(s) orally once a day (at bedtime)  Tylenol 325 mg oral capsule: 2 cap(s) orally 2 times a day   ALPRAZolam 0.5 mg oral tablet: 1 tab(s) orally 2 times a day MDD:2  Artificial Tears ophthalmic solution: 1 dose(s) to each affected eye once a day  aspirin 81 mg oral tablet: 1 tab(s) orally once a day  Basaglar KwikPen 100 units/mL subcutaneous solution: 8 unit(s) subcutaneous once a day (at bedtime)   DuoNeb 0.5 mg-2.5 mg/3 mL inhalation solution: 1 scoop(s) inhaled 4 times a day  gabapentin 100 mg oral capsule: 1 cap(s) orally 3 times a day  insulin lispro 100 units/mL injectable solution: 3 unit(s) injectable 3 times a day (before meals)   latanoprost 0.005% ophthalmic solution: 1 drop(s) to both  eye once a day (in the evening)  Melatonin 3 mg oral tablet: 1 tab(s) orally once a day (at bedtime)  MiraLax oral powder for reconstitution:   multivitamin with minerals:   oxycodone-acetaminophen 5 mg-325 mg oral tablet: 1 tab(s) orally every 6 hours, As needed, Moderate Pain (4 - 6) MDD:MAX 4 tabs  predniSONE 10 mg oral tablet: 4 tab(s) orally once a day x 4 days  3 tab(s) orally once a day x 4 days  2 tab(s) orally once a day x 4 days  1 tab(s) orally once a day x 4 days  Proventil 2.5 mg/3 mL (0.083%) inhalation solution: 2 puff(s) inhaled every 6 hours, As Needed  senna oral tablet: 2 tab(s) orally once a day (at bedtime)  simethicone 80 mg oral tablet: 1 tab(s) orally 4 times a day (after meals and at bedtime)  Symbicort 160 mcg-4.5 mcg/inh inhalation aerosol: 2 puff(s) inhaled 2 times a day  traZODone 150 mg oral tablet: 1 tab(s) orally once a day (at bedtime)  Tylenol 325 mg oral capsule: 2 cap(s) orally 2 times a day

## 2022-03-13 NOTE — PROGRESS NOTE ADULT - ASSESSMENT
61 year old male, from NH, active smoker PMHx of COPD, pulm HTN, CAD, ?CHF, HTN, HLD, TIA, obesity,  peripheral neuropathy, GERD,  polyarthritis, Vit D Def admitted to ER (3/9) with with chief complaint of shortness of breath. RRT called in ED due to worsening SOB and hypoxia where he was initially treated with BiPAP and solumedrol 125mg. Patient was subsequently admitted to ICU where he ultimately required intubation (3/10-3/11) for respiratory failure. While in the ICU he was managed with methylprednisolone 40mg IV q8h (3/9-3/11) then weaned to 40mg q12h (3/11); empiric Ceftriaxone/Azithromycin (d/c'd 3/10); Furosemide for possible component of CHF (TTE 3/10 showed LVEF 70%, RV enlargement with normal RV systolic function; mod incr RV systolic pressure @53mmHg). He is now on nasal cannula during the day with nightly BiPAP; swallow evaluation 3/11 showed signs of dysphagia for which he was started on a minced & moist diet. Patient transferred to  (3/13) for further management. 61 year old male, from NH, active smoker PMHx of COPD, pulm HTN, CAD, ?CHF, HTN, HLD, TIA, obesity,  peripheral neuropathy, GERD,  polyarthritis, Vit D Def admitted to ER (3/9) with with chief complaint of shortness of breath. RRT called in ED due to worsening SOB and hypoxia where he was initially treated with BiPAP and solumedrol 125mg. Patient was subsequently admitted to ICU where he ultimately required intubation (3/10-3/11) for respiratory failure. While in the ICU he was managed with methylprednisolone 40mg IV q8h (3/9-3/11) then weaned to 40mg q12h (3/11); empiric Ceftriaxone/Azithromycin (d/c'd 3/10); Furosemide for possible component of CHF (TTE 3/10 showed LVEF 70%, RV enlargement with normal RV systolic function; mod incr RV systolic pressure @53mmHg). He is now on nasal cannula during the day with nightly BiPAP; swallow evaluation 3/11 showed signs of dysphagia for which he was started on a minced & moist diet. Patient transferred to  (3/13) for further management.

## 2022-03-13 NOTE — DISCHARGE NOTE PROVIDER - NSDCCPCAREPLAN_GEN_ALL_CORE_FT
PRINCIPAL DISCHARGE DIAGNOSIS  Diagnosis: Shortness of breath  Assessment and Plan of Treatment: You were admitted for shortness of breath and needed to be intubated to protect your airway.  Please make sure to continue using your Bipap machine at night as instructed and follow up with the pulmonologist as outpatient.  Avoid cigarette smoking.      SECONDARY DISCHARGE DIAGNOSES  Diagnosis: COPD (chronic obstructive pulmonary disease)  Assessment and Plan of Treatment: Continue to use your Bipap machine and your oxygen to help minimize your risks of frequent rehospitalization.  Report any increasing symptoms of shortness of breath or continue to use your inhalers as prescribed.    Diagnosis: HLD (hyperlipidemia)  Assessment and Plan of Treatment: Continue taking your medication as prescribed for your cholesterol.  Report any symptoms of abdominal pain with nausea or vomiting, changes in the color of your urine.    Diagnosis: Chronic back pain  Assessment and Plan of Treatment: Continue taking your medication as prescribed and report any symptoms of increasing back pain with bowel or bladder incontinence, numbness or tingling sensation to your lower extremities.    Diagnosis: DM (diabetes mellitus)  Assessment and Plan of Treatment: Continue taking your medication as prescribed and follow up with your doctor.  Report signs and symptoms of hypo and hyperglycemia to your doctor such as dizziness, lightheadedness, increasing thirst, appetite and increase urination, cold, clammy skin.       PRINCIPAL DISCHARGE DIAGNOSIS  Diagnosis: Acute respiratory failure with hypoxia  Assessment and Plan of Treatment: You were admitted and required Mechanical Ventilator, and extubated and restarted on BIPap therapy. Please continue using your BiPap at night.   You had COPD exacerbation, and resolved with medication and noninvasive ventilator.  You were recommneded rehabilitation with physical therapy please follow the directions of your Primary Care Physician and Pulmonologist,  Please continue taking your medication as prescribed. If you have any questions or concerns about your medication please direct them to your prescribing Healthcare Provider.  Please follow up with your Primary Care Physician and Pulmonologist in 1 week.      SECONDARY DISCHARGE DIAGNOSES  Diagnosis: COPD (chronic obstructive pulmonary disease)  Assessment and Plan of Treatment: COPD (chronic obstructive pulmonary disease) can get worse quickly. Your healthcare providers will help you create a care plan to use at home. The plan will give directions on how to prevent or manage shortness of breath. Your family members or anyone who cares for you will also get directions to help you.  DISCHARGE INSTRUCTIONS:  Call your local emergency number (911 in the ) if:  You feel lightheaded, short of breath, and have chest pain.  Seek care immediately if:  You cough up blood.  You are confused, dizzy, or feel faint.  Your arm or leg feels warm, tender, and painful. It may look swollen and red.  Please continue taking your medication as prescribed. If you have any questions or concerns about your medication please direct them to your prescribing Healthcare Provider.    Diagnosis: DM (diabetes mellitus)  Assessment and Plan of Treatment: Your Hemoglobin A1c is 7.4 you have Insulin Dependant Diabetes Mellitus.   When your blood sugar is elevated you may experience:  -excessive thirst, fatigue, hunger or sweating  -nausea or vomiting  -bedwetting or excessive urination  -blurred vision, fast heart rate, headache  You are insulin dependant to manage your Diabetes. Please monitor signs of Hypoglycemia (low Blood Sugar) Blood Sugar <70. Pleae closely monitor your finger stick to tightly manage your blood sugar. You blood sugar should be between  mg/dl. Please follow up with your [facility] physician in a week.  Please follow up with Endocrinology in 2 weeks.    Diagnosis: HLD (hyperlipidemia)  Assessment and Plan of Treatment: Continue taking your medication as prescribed for your cholesterol.  Report any symptoms of abdominal pain with nausea or vomiting, changes in the color of your urine.    Diagnosis: Chronic back pain  Assessment and Plan of Treatment: Continue taking your medication as prescribed and report any symptoms of increasing back pain with bowel or bladder incontinence, numbness or tingling sensation to your lower extremities.

## 2022-03-13 NOTE — DISCHARGE NOTE PROVIDER - HOSPITAL COURSE
61 year old male, from NH, active smoker PMHx of COPD, pulm HTN, CAD, ?CHF, HTN, HLD, TIA, obesity,  peripheral neuropathy, GERD,  polyarthritis, Vit D Def admitted to ER (3/9) with with chief complaint of shortness of breath. RRT called in ED due to worsening SOB and hypoxia where he was initially treated with BiPAP and solumedrol 125mg. Patient was subsequently admitted to ICU where he ultimately required intubation (3/10-3/11) for respiratory failure. While in the ICU he was managed with methylprednisolone 40mg IV q8h (3/9-3/11) then weaned to 40mg q12h (3/11); empiric Ceftriaxone/Azithromycin (d/c'd 3/10); Furosemide for possible component of CHF (TTE 3/10 showed LVEF 70%, RV enlargement with normal RV systolic function; mod incr RV systolic pressure @53mmHg). He is now on nasal cannula during the day with nightly BiPAP; swallow evaluation 3/11 showed signs of dysphagia for which he was started on a minced & moist diet. Patient transferred to  (3/13) for further management. 61 year old male, from NH, active smoker PMHx of COPD, pulm HTN, CAD, ?CHF, HTN, HLD, TIA, obesity,  peripheral neuropathy, GERD,  polyarthritis, Vit D Def admitted to ER (3/9) with with chief complaint of shortness of breath. RRT called in ED due to worsening SOB and hypoxia where he was initially treated with BiPAP and solumedrol 125mg. Patient was subsequently admitted to ICU where he ultimately required intubation (3/10-3/11) for respiratory failure. While in the ICU he was managed with methylprednisolone 40mg IV q8h (3/9-3/11) then weaned to 40mg q12h (3/11); empiric Ceftriaxone/Azithromycin (d/c'd 3/10); Furosemide for possible component of CHF (TTE 3/10 showed LVEF 70%, RV enlargement with normal RV systolic function; mod incr RV systolic pressure @53mmHg). He is now on nasal cannula during the day with nightly BiPAP; swallow evaluation 3/11 showed signs of dysphagia for which he was started on a minced & moist diet. Patient transferred to  (3/13) for further management.    Pt continues to improve and was evaluated by who recommended ?????????    INCOMPLETE:::: 3/14 Patient is a 61 year old Male, from NH, active smoker PMHx of CAD, CHF on BIPAP at NH, COPD (intubation in past), peripheral neuropathy, GERD, HTN, HLD, obesity, polyarthritis, Pulmonary HTN, TIA, Vit D Def. Patient presented to ED with chief complain of shortness of breath for one month. BIPAP machine unavailable at home. Patient developed worsening work of breathing in ED. CXR: bibasilar/perihilar airspace disease. Patient admitted to ICU for Acute Hypoxic Respiratory Failure secondary to COPD exacerbation. Patient tolerated BIPAP, started on symbicort, duonebs q6, solumedrol BID, ceftriaxone and azithromycin.      ICU COURSE  Patient was persistently agitated, and hence intubated for airway protection.   Maintained on minimal vent settings, and a small dose of peripheral levo to support blood pressure during propofol drip, solumedrol and MDIs. Abx were DC'd due to low suspicion of infection, despite one record of low grade fever Tmax of 100.5, patient did not have leukocytosis.   Patient was then extubated on 3/11 to BIPAP for a few hours then maintained on BIPAP at night. NC during the day, agitation was resolved hence extubation.  Mood was stable on Seroquel, in addition to home medications: xanax PRN and trazodone.  Patient remained appropriate post extubation and with BIPAP at night with stable vitals in the day, He is optimized for downgrade to medical floors ( AI) for continued management     Please note that this a brief summary of hospital course please refer to daily progress notes and consult notes for full course and events. Patient seen and examined at bedside, discussed with medical attending. Patient medically cleared for discharge to [facility] for out patient follow up with Pulmonologist.

## 2022-03-13 NOTE — PROGRESS NOTE ADULT - PROBLEM SELECTOR PLAN 6
Percocet 1 tab q6h prn for moderate back pain    #peripheral neuropathy  - consider resuming gabapentin 100mg PO TID if needed

## 2022-03-13 NOTE — PROGRESS NOTE ADULT - PROBLEM SELECTOR PLAN 4
#DM; hyperglycemia exacerbated by steroids  Hb A1c 7.1  FSBG before meals and at bedtime  Continue lantus w/ insulin sliding scale and pre-meal adelmalog  Will adjust insulin sliding scale PRN  CHO diet

## 2022-03-13 NOTE — PROGRESS NOTE ADULT - ASSESSMENT
Patient is a 61 year old male, from NH, active smoker PMHx of CAD, ?CHF, COPD, peripheral neuropathy, GERD, HTN, HLD, obesity, polyarthritis, Pulmonary HTN, TIA, Vit D Def came with chief complain of shortness of breath.   RRT called in ED due to worsening SOB and hypoxia. Patient placed on BiPAP and given solumedrol 125mg. Patient to be transferred to ICU for closer monitoring.     Assessment:  - Acute hypoxic hypercapnic respiratory failure   - COPD exacerbation   - CHF   - Respiratory acidosis   - HTN  - HLD  - CAD  - GERD  - Pulmonary HTN   - Obesity   - Mood disorder   - DM   - Hyperkalemia   - Metabolic alkalosis    Plan:  Neuro:  - #Back Pain  S/p extubation 3/11  c/w fentanyl push for back pain  Off Precedex     #Anxiety  C/w home meds, Trazadone and PRN xanax (increased to 0.5)  added seroquel for mood stabilization for now    Cardiovascular:  #CHF   - patient with history of CHF  - last ECHO showed normal EF last year   - given 20mg IV lasix on admission  - Echo shows normal EF but pulm HTN with RV pressure 53mmHg  - strict I/O show OP of 1.5L  -will assess volume status to restart lasix- hold for now    #HTN  - monitor BP and resume meds as needed  -now off levo and prop BP stable 120s/60s    #HLD   - continue with statin when able to take PO meds     #CAD  - continue with aspirin when able to take PO meds     Pulmonary:   #Acute hypoxic hypercapnic respiratory failure likely due to COPD exacerbation   -was hypercapnic but not altered Mental status from baseline,   - likely due to noncompliance of NIV machine  - s/p intubation 3/10 and extubation 3/11  - Now on NC4L   - ABG 7.44/69/74/47    #COPD  - given solumedrol 125mg in ED  -now on 40mg q12h  - was on Abx for CAP however patient is from NH, and has low suspicion for PNA at this time, will dc Ceftriaxone and Azithro  - albuterol and symbicort    #Respiratory acidosis   - resloved    Infectious Diseases:  - no acute issues   - s/p ctx and zithro for COPD exacerbation   -fever yesterday 100.5 low grade  - Patient has low s/o infection will monitor for now      Gastrointestinal:  - Has NG tube, but now getting extubated to BIPAP  -Will do dysphagia screen and initiate pO feeds  - continue with PPI through tomorrow    Renal:  #Metabolic alkalosis  - AB.44/69/74/47  - Fully compensated with elevation of pCO2  - Not on diuretics now  - No diarreha  - Due to PPI? DCd PPI    #Hyperkalemia  - resolved    #Hyponatremia  -resolved    Heme/onc:   - no acute issues   -Lovenox for PPX    Endo:   #DM  - Hb A1c 7.1  - monitor BS and adjust insulin as needed     Skin/ catheter:   - no acute issues     Prophylaxis:   - lovenox for DVT prophylaxis    Goals of Care:   - Full Code     Dispo: ICU

## 2022-03-13 NOTE — PROGRESS NOTE ADULT - PROBLEM SELECTOR PLAN 1
#Acute on chronic hypoxic hypercapnic respiratory failure likely due to COPD exacerbation   -was hypercapnic but not altered Mental status from baseline, likely due to noncompliance of NIV machine  - s/p intubation 3/10-3/11  - Now on 4L NC during day; nightly BiPAP   - Received Lasix 20mg IV once (3/9) for ?CHF component; TTE 3/10 shows normal EF but pulm HTN with RV pressure 53mmHg, , CXR with hilar congestion; was on lasix at home    #COPD exacerbation   -Continue Methylprednisolone 40mg IV q12h for COPD exacerbation  -Received Ceftriaxone/Azithro 3/9-3/10) for presumed CAP however d/cd given low suspicion for pneumonia and pt from NH  -Continue albuterol and symbicort

## 2022-03-13 NOTE — CHART NOTE - NSCHARTNOTEFT_GEN_A_CORE
60 yo M, from NH, active smoker PMHx of CAD, ?CHF on BIPAP at NH, COPD, peripheral neuropathy, GERD, HTN, HLD, obesity, polyarthritis, Pulmonary HTN, TIA, Vit D Def came with chief complain of shortness of breath for one month. BIAPAP machine was malfunctioning in NH     Of note: In Aug 2021 he was intubated for COPD exacerbation. In Nov 2021 again admitted for COPD exacerbation.    ED course:   HD stable Tachy 118, hypoxic requiring 4L NC  Exam: Pt has increase work of breathing.   Labs: ABG shows CO2 125, PH 7.18 chronic bicarb comp at >45  CXR: bibasilar/perihilar airspace disease    Patient was admitted for AHRF 2/2 COPD exacerbation, maintained on BIPAP, started on symbicort, duonebs q6, solumedrol BID, ceftr and azithro,   RRT called for worsening hypoxia and WOB, hence ICU admission     ICU COURSE  Patient was persistently agitated, and hence intubated for airway protection  Maintained on minimal vent settings, and a small dose of peripheral levo to support blood pressure during propofol drip.  Also maintained on solumedrol and MDIs. Abx were DC'd due to low suspicion of infection, despite one record of low grade fever Tmax of 100.5, patient did not have leukocytosis.    Patient was then extubated on 3/11 to BIPAP for a few hours then maintained on BIPAP at night. NC during the day, agitation was resolved hence extubation.  Mood was stable on Seroquel, in addition to home medications: xanax PRN and 62 yo M, from NH, active smoker PMHx of CAD, ?CHF on BIPAP at NH, COPD, peripheral neuropathy, GERD, HTN, HLD, obesity, polyarthritis, Pulmonary HTN, TIA, Vit D Def came with chief complain of shortness of breath for one month. BIAPAP machine was malfunctioning in NH     Of note: In Aug 2021 he was intubated for COPD exacerbation. In Nov 2021 again admitted for COPD exacerbation.    ED course:   HD stable Tachy 118, hypoxic requiring 4L NC  Exam: Pt has increase work of breathing.   Labs: ABG shows CO2 125, PH 7.18 chronic bicarb comp at >45  CXR: bibasilar/perihilar airspace disease    Patient was admitted for AHRF 2/2 COPD exacerbation, maintained on BIPAP, started on symbicort, duonebs q6, solumedrol BID, ceftr and azithro,   RRT called for worsening hypoxia and WOB, hence ICU admission     ICU COURSE  Patient was persistently agitated, and hence intubated for airway protection  Maintained on minimal vent settings, and a small dose of peripheral levo to support blood pressure during propofol drip.  Also maintained on solumedrol and MDIs. Abx were DC'd due to low suspicion of infection, despite one record of low grade fever Tmax of 100.5, patient did not have leukocytosis.    Patient was then extubated on 3/11 to BIPAP for a few hours then maintained on BIPAP at night. NC during the day, agitation was resolved hence extubation.  Mood was stable on Seroquel, in addition to home medications: xanax PRN and trazodone.  Patient remained appropriate post extubation and with BIPAP at night with stable vitals in the day, He is optimized for downgrade to medical floors ( AI) for continued management     Signed out to DEBBIE Burden  -c/w solumedrol taper  -c/w inhalers  -BIPAP at night  -C/w schizzo meds

## 2022-03-13 NOTE — PROGRESS NOTE ADULT - PROBLEM SELECTOR PLAN 7
#Anxiety/? Mood disorder  C/w home meds, Trazadone and PRN xanax (increased to 0.5)  Continue quetiapine which was added in ICU for mood stabilization

## 2022-03-13 NOTE — PROGRESS NOTE ADULT - PROBLEM SELECTOR PLAN 8
#Prophylaxis  - Lovenox/SCDs for DVT ppx  - d/c mark catheter    #glaucoma  - restart latanoprost eye gtt bedtime nightly    #Discharge Planning  - Likely d/c back to NH  - Will need nightly BiPAP

## 2022-03-13 NOTE — PROGRESS NOTE ADULT - PROBLEM SELECTOR PLAN 3
Swallow eval performed 3/11/22 + dysphagia, cleared for minced and most diet however evaluation was performed day of extubation; now patient clinically improved    patient requesting regular diet; d/w Dr. Marquez, advanced to easy to chew diet

## 2022-03-13 NOTE — PROGRESS NOTE ADULT - SUBJECTIVE AND OBJECTIVE BOX
INTERVAL HPI/OVERNIGHT EVENTS:  Pt desaturating overnight on 2L nc, placed on 4L with improvement in O2. Pt placed on BIPAP overnight.    PRESSORS: [ ] YES [ ] NO  WHICH:    ANTIBIOTICS:                      Antimicrobial:    Cardiovascular:    Pulmonary:  ALBUTerol    90 MICROgram(s) HFA Inhaler 2 Puff(s) Inhalation every 6 hours  ipratropium 17 MICROgram(s) HFA Inhaler 1 Puff(s) Inhalation every 6 hours    Hematalogic:  aspirin enteric coated 81 milliGRAM(s) Oral daily  enoxaparin Injectable 40 milliGRAM(s) SubCutaneous every 24 hours    Other:  ALPRAZolam 0.5 milliGRAM(s) Oral two times a day PRN  benzocaine 15 mG/menthol 3.6 mG Lozenge 1 Lozenge Oral two times a day  chlorhexidine 2% Cloths 1 Application(s) Topical <User Schedule>  fentaNYL    Injectable 25 MICROGram(s) IV Push every 4 hours PRN  insulin glargine Injectable (LANTUS) 8 Unit(s) SubCutaneous at bedtime  insulin lispro (ADMELOG) corrective regimen sliding scale   SubCutaneous three times a day before meals  insulin lispro Injectable (ADMELOG) 3 Unit(s) SubCutaneous three times a day before meals  methylPREDNISolone sodium succinate Injectable 40 milliGRAM(s) IV Push every 12 hours  QUEtiapine 25 milliGRAM(s) Oral daily  senna 2 Tablet(s) Oral at bedtime  traZODone 150 milliGRAM(s) Oral at bedtime    ALBUTerol    90 MICROgram(s) HFA Inhaler 2 Puff(s) Inhalation every 6 hours  ALPRAZolam 0.5 milliGRAM(s) Oral two times a day PRN  aspirin enteric coated 81 milliGRAM(s) Oral daily  benzocaine 15 mG/menthol 3.6 mG Lozenge 1 Lozenge Oral two times a day  chlorhexidine 2% Cloths 1 Application(s) Topical <User Schedule>  enoxaparin Injectable 40 milliGRAM(s) SubCutaneous every 24 hours  fentaNYL    Injectable 25 MICROGram(s) IV Push every 4 hours PRN  insulin glargine Injectable (LANTUS) 8 Unit(s) SubCutaneous at bedtime  insulin lispro (ADMELOG) corrective regimen sliding scale   SubCutaneous three times a day before meals  insulin lispro Injectable (ADMELOG) 3 Unit(s) SubCutaneous three times a day before meals  ipratropium 17 MICROgram(s) HFA Inhaler 1 Puff(s) Inhalation every 6 hours  methylPREDNISolone sodium succinate Injectable 40 milliGRAM(s) IV Push every 12 hours  QUEtiapine 25 milliGRAM(s) Oral daily  senna 2 Tablet(s) Oral at bedtime  traZODone 150 milliGRAM(s) Oral at bedtime    Drug Dosing Weight  Height (cm): 172.7 (09 Mar 2022 10:55)  Weight (kg): 101.1 (09 Mar 2022 22:00)  BMI (kg/m2): 33.9 (09 Mar 2022 22:00)  BSA (m2): 2.14 (09 Mar 2022 22:00)    CENTRAL LINE: [ ] YES [ ] NO  LOCATION:   DATE INSERTED:  REMOVE: [ ] YES [ ] NO  EXPLAIN:    AMOR: [ ] YES [ ] NO    DATE INSERTED:  REMOVE:  [ ] YES [ ] NO  EXPLAIN:    A-LINE:  [ ] YES [ ] NO  LOCATION:   DATE INSERTED:  REMOVE:  [ ] YES [ ] NO  EXPLAIN:    PMH -reviewed admission note, no change since admission    ICU Vital Signs Last 24 Hrs  T(C): 36.1 (12 Mar 2022 16:12), Max: 37.1 (12 Mar 2022 08:00)  T(F): 97 (12 Mar 2022 16:12), Max: 98.8 (12 Mar 2022 08:00)  HR: 92 (12 Mar 2022 22:00) (76 - 115)  BP: 139/75 (12 Mar 2022 22:00) (118/69 - 161/81)  BP(mean): 93 (12 Mar 2022 22:00) (73 - 100)  ABP: --  ABP(mean): --  RR: 35 (12 Mar 2022 22:00) (0 - 44)  SpO2: 85% (12 Mar 2022 22:00) (75% - 98%)      ABG - ( 11 Mar 2022 12:43 )  pH, Arterial: 7.44  pH, Blood: x     /  pCO2: 69    /  pO2: 74    / HCO3: 47    / Base Excess: 18.8  /  SaO2: 96                    03-11 @ 07:01  -  03-12 @ 07:00  --------------------------------------------------------  IN: 187.8 mL / OUT: 1940 mL / NET: -1752.2 mL            PHYSICAL EXAM:  GENERAL: mild distress with pain, lying in bed comfortably   HEAD:  Atraumatic, Normocephalic  ENT: Moist mucous membranes  NECK: Supple, No JVD  CHEST/LUNG: No rales, rhonchi, + distant scattered wheezing b/L, no  rubs. Unlabored respirations  HEART: Regular rate and rhythm; No murmurs, rubs, or gallops  ABDOMEN: Bowel sounds present; Soft, Nontender, + distended. No hepatomegaly  EXTREMITIES:  2+ Peripheral Pulses, brisk capillary refill. No clubbing, cyanosis, or edema  NERVOUS SYSTEM:  Awake and alert  MSK: Moving all extremities   SKIN: No rashes or lesions      LABS:  CBC Full  -  ( 12 Mar 2022 04:47 )  WBC Count : 9.98 K/uL  RBC Count : 4.36 M/uL  Hemoglobin : 12.5 g/dL  Hematocrit : 40.5 %  Platelet Count - Automated : 153 K/uL  Mean Cell Volume : 92.9 fl  Mean Cell Hemoglobin : 28.7 pg  Mean Cell Hemoglobin Concentration : 30.9 gm/dL  Auto Neutrophil # : 7.06 K/uL  Auto Lymphocyte # : 1.84 K/uL  Auto Monocyte # : 0.98 K/uL  Auto Eosinophil # : 0.02 K/uL  Auto Basophil # : 0.02 K/uL  Auto Neutrophil % : 70.8 %  Auto Lymphocyte % : 18.4 %  Auto Monocyte % : 9.8 %  Auto Eosinophil % : 0.2 %  Auto Basophil % : 0.2 %    03-12    140  |  97  |  24<H>  ----------------------------<  127<H>  3.8   |  43<H>  |  0.49<L>    Ca    8.5      12 Mar 2022 04:47  Phos  3.6     03-12  Mg     2.1     03-12    TPro  6.1  /  Alb  2.9<L>  /  TBili  0.6  /  DBili  x   /  AST  13  /  ALT  20  /  AlkPhos  52  03-12            RADIOLOGY & ADDITIONAL STUDIES REVIEWED:  ***    GOALS OF CARE DISCUSSION WITH PATIENT/FAMILY/PROXY:    CRITICAL CARE TIME SPENT: 35 minutes

## 2022-03-13 NOTE — PROGRESS NOTE ADULT - PROBLEM SELECTOR PLAN 2
Continue Methylprednisolone 40mg IV q12h   Received Ceftriaxone/Azithro 3/9-3/10) for presumed CAP however d/cd given low suspicion for pneumonia and pt from NH  Continue albuterol and symbicort

## 2022-03-13 NOTE — DISCHARGE NOTE PROVIDER - CARE PROVIDER_API CALL
Raman Ramos)  Medicine  [id number] [place] [address]  Phone: [phone]  Fax: [phone]  Follow Up Time:

## 2022-03-14 LAB — SARS-COV-2 RNA SPEC QL NAA+PROBE: SIGNIFICANT CHANGE UP

## 2022-03-14 RX ORDER — ALBUTEROL 90 UG/1
2 AEROSOL, METERED ORAL EVERY 6 HOURS
Refills: 0 | Status: DISCONTINUED | OUTPATIENT
Start: 2022-03-14 | End: 2022-03-15

## 2022-03-14 RX ORDER — OXYCODONE AND ACETAMINOPHEN 5; 325 MG/1; MG/1
1 TABLET ORAL ONCE
Refills: 0 | Status: DISCONTINUED | OUTPATIENT
Start: 2022-03-14 | End: 2022-03-14

## 2022-03-14 RX ORDER — PANTOPRAZOLE SODIUM 20 MG/1
40 TABLET, DELAYED RELEASE ORAL
Refills: 0 | Status: DISCONTINUED | OUTPATIENT
Start: 2022-03-14 | End: 2022-03-15

## 2022-03-14 RX ORDER — LANOLIN ALCOHOL/MO/W.PET/CERES
5 CREAM (GRAM) TOPICAL AT BEDTIME
Refills: 0 | Status: DISCONTINUED | OUTPATIENT
Start: 2022-03-14 | End: 2022-03-15

## 2022-03-14 RX ADMIN — Medication 40 MILLIGRAM(S): at 17:23

## 2022-03-14 RX ADMIN — Medication 3 UNIT(S): at 12:16

## 2022-03-14 RX ADMIN — Medication 4: at 12:19

## 2022-03-14 RX ADMIN — BENZOCAINE AND MENTHOL 1 LOZENGE: 5; 1 LIQUID ORAL at 17:23

## 2022-03-14 RX ADMIN — INSULIN GLARGINE 8 UNIT(S): 100 INJECTION, SOLUTION SUBCUTANEOUS at 22:36

## 2022-03-14 RX ADMIN — Medication 1 PUFF(S): at 11:08

## 2022-03-14 RX ADMIN — Medication 1: at 08:26

## 2022-03-14 RX ADMIN — OXYCODONE AND ACETAMINOPHEN 1 TABLET(S): 5; 325 TABLET ORAL at 15:26

## 2022-03-14 RX ADMIN — Medication 4: at 21:41

## 2022-03-14 RX ADMIN — ALBUTEROL 2 PUFF(S): 90 AEROSOL, METERED ORAL at 05:16

## 2022-03-14 RX ADMIN — OXYCODONE AND ACETAMINOPHEN 1 TABLET(S): 5; 325 TABLET ORAL at 16:31

## 2022-03-14 RX ADMIN — CHLORHEXIDINE GLUCONATE 1 APPLICATION(S): 213 SOLUTION TOPICAL at 05:16

## 2022-03-14 RX ADMIN — ALBUTEROL 2 PUFF(S): 90 AEROSOL, METERED ORAL at 08:33

## 2022-03-14 RX ADMIN — Medication 150 MILLIGRAM(S): at 22:36

## 2022-03-14 RX ADMIN — Medication 3 UNIT(S): at 08:25

## 2022-03-14 RX ADMIN — BENZOCAINE AND MENTHOL 1 LOZENGE: 5; 1 LIQUID ORAL at 05:11

## 2022-03-14 RX ADMIN — Medication 3: at 17:22

## 2022-03-14 RX ADMIN — ENOXAPARIN SODIUM 40 MILLIGRAM(S): 100 INJECTION SUBCUTANEOUS at 05:15

## 2022-03-14 RX ADMIN — Medication 1 PUFF(S): at 16:41

## 2022-03-14 RX ADMIN — Medication 3 UNIT(S): at 17:22

## 2022-03-14 RX ADMIN — OXYCODONE AND ACETAMINOPHEN 1 TABLET(S): 5; 325 TABLET ORAL at 22:00

## 2022-03-14 RX ADMIN — OXYCODONE AND ACETAMINOPHEN 1 TABLET(S): 5; 325 TABLET ORAL at 12:34

## 2022-03-14 RX ADMIN — OXYCODONE AND ACETAMINOPHEN 1 TABLET(S): 5; 325 TABLET ORAL at 21:06

## 2022-03-14 RX ADMIN — LATANOPROST 1 DROP(S): 0.05 SOLUTION/ DROPS OPHTHALMIC; TOPICAL at 22:36

## 2022-03-14 RX ADMIN — Medication 81 MILLIGRAM(S): at 12:23

## 2022-03-14 RX ADMIN — QUETIAPINE FUMARATE 25 MILLIGRAM(S): 200 TABLET, FILM COATED ORAL at 12:23

## 2022-03-14 RX ADMIN — OXYCODONE AND ACETAMINOPHEN 1 TABLET(S): 5; 325 TABLET ORAL at 11:35

## 2022-03-14 RX ADMIN — Medication 40 MILLIGRAM(S): at 05:10

## 2022-03-14 RX ADMIN — Medication 1 PUFF(S): at 05:16

## 2022-03-14 NOTE — PROGRESS NOTE ADULT - SUBJECTIVE AND OBJECTIVE BOX
Patient is a 61y old  Male who presents with a chief complaint of COPD exacerbation (14 Mar 2022 11:08)    PATIENT IS SEEN AND EXAMINED IN MEDICAL FLOOR.    NGT [    ]    MT [   ]      GT [   ]    ALLERGIES:  No Known Allergies      Daily     Daily     VITALS:    Vital Signs Last 24 Hrs  T(C): 36.4 (14 Mar 2022 22:00), Max: 36.7 (14 Mar 2022 13:43)  T(F): 97.6 (14 Mar 2022 22:00), Max: 98 (14 Mar 2022 13:43)  HR: 102 (14 Mar 2022 22:00) (73 - 102)  BP: 158/59 (14 Mar 2022 22:00) (107/74 - 158/59)  BP(mean): 89 (14 Mar 2022 12:09) (89 - 100)  RR: 20 (14 Mar 2022 22:00) (19 - 20)  SpO2: 98% (14 Mar 2022 22:00) (90% - 98%)    LABS:    CBC Full  -  ( 13 Mar 2022 04:15 )  WBC Count : 9.82 K/uL  RBC Count : 4.27 M/uL  Hemoglobin : 12.2 g/dL  Hematocrit : 40.2 %  Platelet Count - Automated : 142 K/uL  Mean Cell Volume : 94.1 fl  Mean Cell Hemoglobin : 28.6 pg  Mean Cell Hemoglobin Concentration : 30.3 gm/dL  Auto Neutrophil # : 7.21 K/uL  Auto Lymphocyte # : 1.58 K/uL  Auto Monocyte # : 0.93 K/uL  Auto Eosinophil # : 0.05 K/uL  Auto Basophil # : 0.01 K/uL  Auto Neutrophil % : 73.4 %  Auto Lymphocyte % : 16.1 %  Auto Monocyte % : 9.5 %  Auto Eosinophil % : 0.5 %  Auto Basophil % : 0.1 %      03-13    139  |  99  |  17  ----------------------------<  136<H>  3.9   |  38<H>  |  0.44<L>    Ca    8.5      13 Mar 2022 04:15  Phos  3.4     03-13  Mg     2.0     03-13      CAPILLARY BLOOD GLUCOSE    POCT Blood Glucose.: 324 mg/dL (14 Mar 2022 21:19)  POCT Blood Glucose.: 288 mg/dL (14 Mar 2022 16:52)  POCT Blood Glucose.: 302 mg/dL (14 Mar 2022 11:41)  POCT Blood Glucose.: 200 mg/dL (14 Mar 2022 08:01)  POCT Blood Glucose.: 309 mg/dL (14 Mar 2022 02:49)  POCT Blood Glucose.: 376 mg/dL (13 Mar 2022 22:36)      Creatinine Trend: 0.44<--, 0.49<--, 0.70<--, 0.62<--, 0.63<--, 0.53<--  I&O's Summary    13 Mar 2022 07:01  -  14 Mar 2022 07:00  --------------------------------------------------------  IN: 0 mL / OUT: 805 mL / NET: -805 mL    14 Mar 2022 07:01  -  14 Mar 2022 22:07  --------------------------------------------------------  IN: 0 mL / OUT: 2000 mL / NET: -2000 mL          MEDICATIONS:    MEDICATIONS  (STANDING):  ALBUTerol    90 MICROgram(s) HFA Inhaler 2 Puff(s) Inhalation every 6 hours  aspirin enteric coated 81 milliGRAM(s) Oral daily  benzocaine 15 mG/menthol 3.6 mG Lozenge 1 Lozenge Oral two times a day  chlorhexidine 2% Cloths 1 Application(s) Topical <User Schedule>  enoxaparin Injectable 40 milliGRAM(s) SubCutaneous every 24 hours  insulin glargine Injectable (LANTUS) 8 Unit(s) SubCutaneous at bedtime  insulin lispro (ADMELOG) corrective regimen sliding scale   SubCutaneous Before meals and at bedtime  insulin lispro Injectable (ADMELOG) 3 Unit(s) SubCutaneous three times a day before meals  ipratropium 17 MICROgram(s) HFA Inhaler 1 Puff(s) Inhalation every 6 hours  latanoprost 0.005% Ophthalmic Solution 1 Drop(s) Both EYES at bedtime  pantoprazole    Tablet 40 milliGRAM(s) Oral before breakfast  QUEtiapine 25 milliGRAM(s) Oral daily  traZODone 150 milliGRAM(s) Oral at bedtime      MEDICATIONS  (PRN):  ALPRAZolam 0.5 milliGRAM(s) Oral two times a day PRN Agitation/anxiety  melatonin 5 milliGRAM(s) Oral at bedtime PRN Sleep  oxycodone    5 mG/acetaminophen 325 mG 1 Tablet(s) Oral every 6 hours PRN Moderate Pain (4 - 6)  senna 2 Tablet(s) Oral at bedtime PRN Constipation        REVIEW OF SYSTEMS:                           ALL ROS DONE [ X   ]      CONSTITUTIONAL:  LETHARGIC [   ], FEVER [   ], UNRESPONSIVE [   ]  CVS:  CP  [   ], SOB, [   ], PALPITATIONS [   ], DIZZYNESS [   ]  RS: COUGH [   ], SPUTUM [   ]  GI: ABDOMINAL PAIN [   ], NAUSEA [   ], VOMITINGS [   ], DIARRHEA [   ], CONSTIPATION [   ]  :  DYSURIA [   ], NOCTURIA [   ], INCREASED FREQUENCY [   ], DRIBLING [   ],  SKELETAL: PAINFUL JOINTS [   ], SWOLLEN JOINTS [   ], NECK ACHE [   ], LOW BACK ACHE [   ],  SKIN : ULCERS [   ], RASH [   ], ITCHING [   ]  CNS: HEAD ACHE [   ], DOUBLE VISION [   ], BLURRED VISION [   ], AMS / CONFUSION [   ], SEIZURES [   ], WEAKNESS [   ],TINGLING / NUMBNESS [   ]            PHYSICAL EXAMINATION:    GENERAL APPEARANCE: NO DISTRESS  HEENT:  NO PALLOR, NO  JVD,  NO   NODES, NECK SUPPLE    ORAL ET + , ORAL GT +  CVS: S1 +, S2 +,   RS: AEEB,  OCCASIONAL  RALES +,   B/L RONCHI +  ABD: SOFT, NT, NO, BS +                                                           AMOR +  EXT: PE ++  SKIN: WARM,   SKELETAL:  ROM REDUCED AT CERVICAL & LS SPINE   CNS:  AAO X 2   , NO  DEFICITS        RADIOLOGY :    < from: Xray Chest 1 View-PORTABLE IMMEDIATE (Xray Chest 1 View-PORTABLE IMMEDIATE .) (03.10.22 @ 17:11) >    IMPRESSION: Improved right base infiltrate. COPD NG tube. No bowel   obstruction.    < end of copied text >        ASSESSMENT :     Shortness of breath    COPD (chronic obstructive pulmonary disease)    Stented coronary artery    HLD (hyperlipidemia)    Pulmonary HTN    Type 2 diabetes mellitus without complication, with long-term current use of insulin    Coronary artery disease involving native coronary artery of native heart without angina pectoris    Bipolar 1 disorder    Smoker    Gastroesophageal reflux disease, esophagitis presence not specified    Oxygen dependent    COPD (chronic obstructive pulmonary disease)    DM (diabetes mellitus)    CAD (coronary artery disease)    GERD (gastroesophageal reflux disease)    HLD (hyperlipidemia)    Insomnia    Polyarthritis    CHF (congestive heart failure)    HTN (hypertension)    Mood disorder          PLAN:  HPI:  60 yo M, from NH, active smoker PMHx of CAD, ?CHF, COPD, peripheral neuropathy, GERD, HTN, HLD, obesity, polyarthritis, Pulmonary HTN, TIA, Vit D Def came with chief complain of shortness of breadth . As per ED triage pt BIAPAP machine was malfunctioning in NH. He used BIPAP at night in NH. Pt is a mild historian and unable to provide detailed hx due to BIPAP and lethargic in the setting of CO2 retention. On assessment he is lethargic but arousable and follows simple commands. Pt stated that he came to the hospital because he is having shortness of breadth for one month. He denied any chest pain, N/V/D.     Of note Pt non complaint to his BIPAP use . In Aug 2021 he was intubated for COPD exacerbation. In Nov 2021 again admitted for COPD exacerbation.    ED course: Pt has increase work of breathing. ABG shows CO2 125, PH 7.18 obtained on 4lit NC . After using bipap repeat blood gas on 3 lit NC shows Ph 7.3 , CO2 >125.   (09 Mar 2022 16:38)      - DC PLAN IN AM BACK TO [facility]  ( ON TAPERING DOSES OF PREDNISONE. OBTAINED  PT & OT     - DOWN GRADED TO FLOOR FROM ICU. BEING OBSERVED IN ACUTE CARE UNIT    - COPD EXACERBATION, HYPOXIC & HYPERCAPNOEIC RESPIRATORY FAILURE -  ON IV METHYLPREDNISOLONE - S/P EXTUBATION ON 03/11/22 , O2 SUPPLEMENTS,  ICU F/UP IS IN PROGRESS - PATIENT IS TOLERATING WELL     - METABOLIC ENCEPHALOPATHY - RESOLVING     - GI AND DVT PROPHYLAXIS    - DR. REFUGIO CABELLO

## 2022-03-14 NOTE — PHYSICAL THERAPY INITIAL EVALUATION ADULT - DIAGNOSIS, PT EVAL
Overall decline in functional mobility due to generalized weakness, decreased cardiopulmonary function, balance and endurance.

## 2022-03-14 NOTE — PROGRESS NOTE ADULT - PROBLEM SELECTOR PLAN 1
#Acute on chronic hypoxic hypercapnic respiratory failure likely due to COPD exacerbation   -was hypercapnic but not altered Mental status from baseline, likely due to noncompliance of NIV machine  - s/p intubation 3/10-3/11  - Now on 4L NC during day; nightly BiPAP   - Received Lasix 20mg IV once (3/9) for ?CHF component; TTE 3/10 shows normal EF but pulm HTN with RV pressure 53mmHg, , CXR with hilar congestion; was on lasix at home    #COPD exacerbation   -Continue Methylprednisolone 40mg IV q12h for COPD exacerbation  -Received Ceftriaxone/Azithro 3/9-3/10) for presumed CAP however d/cd given low suspicion for pneumonia and pt from NH  -Continue albuterol and symbicort S/p intubation 3/10-3/11  Now on 3- 4L NC during day; nightly BiPAP   TTE 3/10 showed nml normal EF but pulm HTN with RV pressure 53mmHg, , CXR with hilar congestion; was on Lasix at home  Does not appear overload  continue to monitor off diuretic for now

## 2022-03-14 NOTE — PROGRESS NOTE ADULT - PROBLEM SELECTOR PLAN 6
Percocet 1 tab q6h prn for moderate back pain    #peripheral neuropathy  - consider resuming gabapentin 100mg PO TID if needed Percocet 1 tab q6h prn for moderate back pain  consider resuming gabapentin 100mg PO TID if needed

## 2022-03-14 NOTE — PROGRESS NOTE ADULT - SUBJECTIVE AND OBJECTIVE BOX
Time of visit:    CHIEF COMPLAINT: Patient is a 61y old  Male who presents with a chief complaint of COPD exacerbation (14 Mar 2022 09:31)      HPI:  60 yo M, from NH, active smoker PMHx of CAD, ?CHF, COPD, peripheral neuropathy, GERD, HTN, HLD, obesity, polyarthritis, Pulmonary HTN, TIA, Vit D Def came with chief complain of shortness of breadth . As per ED triage pt BIAPAP machine was malfunctioning in NH. He used BIPAP at night in NH. Pt is a mild historian and unable to provide detailed hx due to BIPAP and lethargic in the setting of CO2 retention. On assessment he is lethargic but arousable and follows simple commands. Pt stated that he came to the hospital because he is having shortness of breadth for one month. He denied any chest pain, N/V/D.     Of note Pt non complaint to his BIPAP use . In Aug 2021 he was intubated for COPD exacerbation. In Nov 2021 again admitted for COPD exacerbation.    ED course: Pt has increase work of breathing. ABG shows CO2 125, PH 7.18 obtained on 4lit NC . After using bipap repeat blood gas on 3 lit NC shows Ph 7.3 , CO2 >125.   (09 Mar 2022 16:38)   Patient seen and examined.     PAST MEDICAL & SURGICAL HISTORY:  COPD (chronic obstructive pulmonary disease)  Oxygen 2-3L at home    Stented coronary artery  2017    HLD (hyperlipidemia)    Pulmonary HTN    Type 2 diabetes mellitus without complication, with long-term current use of insulin    Coronary artery disease involving native coronary artery of native heart without angina pectoris    Smoker    Gastroesophageal reflux disease, esophagitis presence not specified    Oxygen dependent    DM (diabetes mellitus)    CAD (coronary artery disease)    GERD (gastroesophageal reflux disease)    Insomnia    CHF (congestive heart failure)    HTN (hypertension)    Mood disorder    No significant past surgical history        Allergies    No Known Allergies    Intolerances        MEDICATIONS  (STANDING):  ALBUTerol    90 MICROgram(s) HFA Inhaler 2 Puff(s) Inhalation every 6 hours  aspirin enteric coated 81 milliGRAM(s) Oral daily  benzocaine 15 mG/menthol 3.6 mG Lozenge 1 Lozenge Oral two times a day  chlorhexidine 2% Cloths 1 Application(s) Topical <User Schedule>  enoxaparin Injectable 40 milliGRAM(s) SubCutaneous every 24 hours  insulin glargine Injectable (LANTUS) 8 Unit(s) SubCutaneous at bedtime  insulin lispro (ADMELOG) corrective regimen sliding scale   SubCutaneous Before meals and at bedtime  insulin lispro Injectable (ADMELOG) 3 Unit(s) SubCutaneous three times a day before meals  ipratropium 17 MICROgram(s) HFA Inhaler 1 Puff(s) Inhalation every 6 hours  latanoprost 0.005% Ophthalmic Solution 1 Drop(s) Both EYES at bedtime  predniSONE   Tablet 40 milliGRAM(s) Oral once  QUEtiapine 25 milliGRAM(s) Oral daily  traZODone 150 milliGRAM(s) Oral at bedtime      MEDICATIONS  (PRN):  ALPRAZolam 0.5 milliGRAM(s) Oral two times a day PRN Agitation/anxiety  melatonin 5 milliGRAM(s) Oral at bedtime PRN Sleep  oxycodone    5 mG/acetaminophen 325 mG 1 Tablet(s) Oral every 6 hours PRN Moderate Pain (4 - 6)  senna 2 Tablet(s) Oral at bedtime PRN Constipation   Medications up to date at time of exam.    Medications up to date at time of exam.    FAMILY HISTORY:  No family history of chronic obstructive pulmonary disease    No family history of hypertension    No family history of mental disorder    FH: myocardial infarction (Mother)        SOCIAL HISTORY  Smoking History: [   ] smoking/smoke exposure, [ x  ] former smoker  Living Condition: [   ] apartment, [   ] private house  Work History:   Travel History: denies recent travel  Illicit Substance Use: denies  Alcohol Use: denies    REVIEW OF SYSTEMS:    CONSTITUTIONAL:  No recent fevers, chills. Denies night sweats.     HEENT:  Denies  blurred vision, sore throat nor runny nose.    CARDIOVASCULAR:  Denies chest discomfort . No palpitations.    RESPIRATORY:  No cough, nasal congestion. Verbalized of  SOB on exertion .    GASTROINTESTINAL:  Denies abdominal pain. No  nausea, vomiting nor diarrhea.    GENITOURINARY: No hematuria. Denies dysuria.    NEUROLOGIC:  No seizures nor tremor .    PSYCHIATRIC:  No emotional distress on exam.       PHYSICAL EXAMINATION:    GENERAL: Alert and oriented. Fair historian . No acute distress.     Vital Signs Last 24 Hrs  T(C): 36.1 (14 Mar 2022 05:00), Max: 36.9 (13 Mar 2022 21:18)  T(F): 96.9 (14 Mar 2022 05:00), Max: 98.5 (13 Mar 2022 21:18)  HR: 83 (14 Mar 2022 12:09) (73 - 110)  BP: 139/64 (14 Mar 2022 12:09) (107/74 - 139/65)  BP(mean): 89 (14 Mar 2022 12:09) (89 - 100)  RR: 19 (14 Mar 2022 05:00) (18 - 19)  SpO2: 95% (14 Mar 2022 12:09) (90% - 100%)   (if applicable)    HEENT: Head is normocephalic and atraumatic. No nasal tenderness. Extraocular muscles are intact. Mucous membranes are moist.     NECK: Supple, no palpable adenopathy.    LUNGS: Non labored. Fair air entrance. No use of accessory muscle  .    HEART: S1 S2 Regular rate and no click / rub.     ABDOMEN: Soft, nontender, and nondistended.  Active bowel sounds.     : No bladder distention and tenderness.     NEUROLOGIC: Awake, alert, oriented.     SKIN: Warm and moist. Non diaphoretic.       LABS:                        12.2   9.82  )-----------( 142      ( 13 Mar 2022 04:15 )             40.2     03-13    139  |  99  |  17  ----------------------------<  136<H>  3.9   |  38<H>  |  0.44<L>    Ca    8.5      13 Mar 2022 04:15  Phos  3.4     03-13  Mg     2.0     03-13    MICROBIOLOGY: (if applicable)    RADIOLOGY & ADDITIONAL STUDIES:  EKG:   CXR:  ECHO:    IMPRESSION: 61y Male PAST MEDICAL & SURGICAL HISTORY:  COPD (chronic obstructive pulmonary disease)  Oxygen 2-3L at home    Stented coronary artery  2017    HLD (hyperlipidemia)    Pulmonary HTN    Type 2 diabetes mellitus without complication, with long-term current use of insulin    Coronary artery disease involving native coronary artery of native heart without angina pectoris    Smoker    Gastroesophageal reflux disease, esophagitis presence not specified    Oxygen dependent    DM (diabetes mellitus)    CAD (coronary artery disease)    GERD (gastroesophageal reflux disease)    Insomnia    CHF (congestive heart failure)    HTN (hypertension)    Mood disorder    No significant past surgical history     Impression: This is a 60 Y/O Male presented to ED with shortness of breath . RRT called in ED due to worsening of SOB and Hypoxia . Patient was admitted to ICU got intubated and extubated on 03-11-22 and now in AI . Admitted with Acute on chronic Hypoxic Hypercapnic Respiratory Failure secondary to COPD exacerbation . 03-09-22 Negative PCR for Covid 19.    Suggestion:  O2 saturation 94% with O2 supplement. Continue Oxygen supplementation 4L NC, will monitor O2 saturation trend and monitor for increase Oxygen supplementation demand   .  Reinforce patient the importance of Daily compliance to Bipap, pt refused last night. Continue Bipap 10/5 fio2 40% at Night .   Continue Albuterol 90 mcg 2 Puffs Q 6 Hours.  Continue Ipratropium 1 Puff Q 6 Hours.  Please start Prednisone 40 mg oral daily x 5 days .  DVT/ GI prophylactic . On Lovenox 40 mg SQ Daily.

## 2022-03-14 NOTE — CHART NOTE - NSCHARTNOTEFT_GEN_A_CORE
Assessment:   61yMalePatient is a 61y old  Male who presents with a chief complaint of COPD exacerbation (14 Mar 2022 11:08) Pt Visited. Pt is On O2.  Pt Dg from ICU to 4 N on 3/13. NG TF D/c On 3/11 . S/P SLP eval on 3/11- Minced and Moist  W Mild [place] Liquids. Po Intake ~ % of  meal. Labs noted. .       Factors impacting intake: [ ] none [ ] nausea  [ ] vomiting [ ] diarrhea [ ] constipation  [ ]chewing problems [ ] swallowing issues  [ ] other:     Diet Prescription:  Minced and Moist Mild thick liquids   Intake:  ~  of meal.     Current Weight: Weight (kg): 101.1 (03-09 @ 22:00)  % Weight Change    Pertinent Medications: MEDICATIONS  (STANDING):  ALBUTerol    90 MICROgram(s) HFA Inhaler 2 Puff(s) Inhalation every 6 hours  aspirin enteric coated 81 milliGRAM(s) Oral daily  benzocaine 15 mG/menthol 3.6 mG Lozenge 1 Lozenge Oral two times a day  chlorhexidine 2% Cloths 1 Application(s) Topical <User Schedule>  enoxaparin Injectable 40 milliGRAM(s) SubCutaneous every 24 hours  insulin glargine Injectable (LANTUS) 8 Unit(s) SubCutaneous at bedtime  insulin lispro (ADMELOG) corrective regimen sliding scale   SubCutaneous Before meals and at bedtime  insulin lispro Injectable (ADMELOG) 3 Unit(s) SubCutaneous three times a day before meals  ipratropium 17 MICROgram(s) HFA Inhaler 1 Puff(s) Inhalation every 6 hours  latanoprost 0.005% Ophthalmic Solution 1 Drop(s) Both EYES at bedtime  pantoprazole    Tablet 40 milliGRAM(s) Oral before breakfast  predniSONE   Tablet 40 milliGRAM(s) Oral once  QUEtiapine 25 milliGRAM(s) Oral daily  traZODone 150 milliGRAM(s) Oral at bedtime    MEDICATIONS  (PRN):  ALPRAZolam 0.5 milliGRAM(s) Oral two times a day PRN Agitation/anxiety  melatonin 5 milliGRAM(s) Oral at bedtime PRN Sleep  oxycodone    5 mG/acetaminophen 325 mG 1 Tablet(s) Oral every 6 hours PRN Moderate Pain (4 - 6)  senna 2 Tablet(s) Oral at bedtime PRN Constipation    Pertinent Labs: 03-13 Na139 mmol/L Glu 136 mg/dL<H> K+ 3.9 mmol/L Cr  0.44 mg/dL<L> BUN 17 mg/dL 03-13 Phos 3.4 mg/dL 03-12 Alb 2.9 g/dL<L>     CAPILLARY BLOOD GLUCOSE      POCT Blood Glucose.: 302 mg/dL (14 Mar 2022 11:41)  POCT Blood Glucose.: 200 mg/dL (14 Mar 2022 08:01)  POCT Blood Glucose.: 309 mg/dL (14 Mar 2022 02:49)  POCT Blood Glucose.: 376 mg/dL (13 Mar 2022 22:36)  POCT Blood Glucose.: 381 mg/dL (13 Mar 2022 21:28)    Skin:     Estimated Needs:   [ ] no change since previous assessment  [ ] recalculated:                  Interventions:   Recommend  [ ] Change Diet To:  [ ] Nutrition Supplement  [ ] Nutrition Support  [ x] Other: Continue with current Diet RX.     Monitoring and Evaluation:   [ ] PO intake [ x ] Tolerance to diet prescription [ x ] weights [ x ] labs[ x ] follow up per protocol  [ ] other:

## 2022-03-14 NOTE — PROGRESS NOTE ADULT - ASSESSMENT
61 year old male, from NH, active smoker PMHx of COPD, pulm HTN, CAD, ?CHF, HTN, HLD, TIA, obesity,  peripheral neuropathy, GERD,  polyarthritis, Vit D Def admitted to ER (3/9) with with chief complaint of shortness of breath. RRT called in ED due to worsening SOB and hypoxia where he was initially treated with BiPAP and solumedrol 125mg. Patient was subsequently admitted to ICU where he ultimately required intubation (3/10-3/11) for respiratory failure. While in the ICU he was managed with methylprednisolone 40mg IV q8h (3/9-3/11) then weaned to 40mg q12h (3/11); empiric Ceftriaxone/Azithromycin (d/c'd 3/10); Furosemide for possible component of CHF (TTE 3/10 showed LVEF 70%, RV enlargement with normal RV systolic function; mod incr RV systolic pressure @53mmHg). He is now on nasal cannula during the day with nightly BiPAP; swallow evaluation 3/11 showed signs of dysphagia for which he was started on a minced & moist diet. Patient transferred to  (3/13) for further management.           61 year old male, from NH, active smoker PMHx of COPD, pulm HTN, CAD, ?CHF, HTN, HLD, TIA, obesity,  peripheral neuropathy, GERD,  polyarthritis, Vit D Def admitted to ER (3/9) with with chief complaint of shortness of breath. RRT called in ED due to worsening SOB and hypoxia where he was initially treated with BiPAP and solumedrol 125mg. Patient was subsequently admitted to ICU where he ultimately required intubation (3/10-3/11) for respiratory failure. While in the ICU he was managed with methylprednisolone 40mg IV q8h (3/9-3/11) then weaned to 40mg q12h (3/11); empiric Ceftriaxone/Azithromycin (d/c'd 3/10); Furosemide for possible component of CHF (TTE 3/10 showed LVEF 70%, RV enlargement with normal RV systolic function; mod incr RV systolic pressure @53mmHg). He is now on nasal cannula during the day with nightly BiPAP; swallow evaluation 3/11 showed signs of dysphagia for which he was started on a minced & moist diet. Patient transferred to AI (3/13) for further management.    3/14- Transitioned from IV to oral steroid.  Pending PT eval.  Pt is otherwise medically stable for D/C and anticipating D/C today or tomorrow.

## 2022-03-14 NOTE — PROGRESS NOTE ADULT - PROBLEM SELECTOR PLAN 3
Swallow eval performed 3/11/22 + dysphagia, cleared for minced and most diet however evaluation was performed day of extubation; now patient clinically improved    patient requesting regular diet; d/w Dr. Marquez, advanced to easy to chew diet Swallow eval performed 3/11/22 + dysphagia, cleared for minced and most diet however evaluation was performed day of extubation; now patient clinically improved  Consistent carb diet easy to chew

## 2022-03-14 NOTE — PROGRESS NOTE ADULT - SUBJECTIVE AND OBJECTIVE BOX
DEVIKA CARBALLO    SCU progress note    INTERVAL HPI/OVERNIGHT EVENTS: ***    DNR [ ]   DNI  [  ]    Covid - 19 PCR:     The 4Ms    What Matters Most: see GOC  Age appropriate Medications/Screen for High Risk Medication: Yes  Mentation: see CAM below  Mobility: defer to physical exam    The Confusion Assessment Method (CAM) Diagnostic Algorithm     1: Acute Onset or Fluctuating Course  - Is there evidence of an acute change in mental status from the patient’s baseline? Did the (abnormal) behavior  fluctuate during the day, that is, tend to come and go, or increase and decrease in severity?  [ ] YES [ ] NO     2: Inattention  - Did the patient have difficulty focusing attention, being easily distractible, or having difficulty keeping track of what was being said?  [ ] YES [ ] NO     3: Disorganized thinking  -Was the patient’s thinking disorganized or incoherent, such as rambling or irrelevant conversation, unclear or illogical flow of ideas, or unpredictable switching from subject to subject?  [ ] YES [ ] NO    4: Altered Level of consciousness?  [ ] YES [ ] NO    The diagnosis of delirium by CAM requires the presence of features 1 and 2 and either 3 or 4.    PRESSORS: [ ] YES [ ] NO    Cardiovascular:  Heart Failure  Acute   Acute on Chronic  Chronic         Pulmonary:  ALBUTerol    90 MICROgram(s) HFA Inhaler 2 Puff(s) Inhalation every 6 hours  ipratropium 17 MICROgram(s) HFA Inhaler 1 Puff(s) Inhalation every 6 hours    Hematalogic:  aspirin enteric coated 81 milliGRAM(s) Oral daily  enoxaparin Injectable 40 milliGRAM(s) SubCutaneous every 24 hours    Other:  ALPRAZolam 0.5 milliGRAM(s) Oral two times a day PRN  benzocaine 15 mG/menthol 3.6 mG Lozenge 1 Lozenge Oral two times a day  chlorhexidine 2% Cloths 1 Application(s) Topical <User Schedule>  insulin glargine Injectable (LANTUS) 8 Unit(s) SubCutaneous at bedtime  insulin lispro (ADMELOG) corrective regimen sliding scale   SubCutaneous Before meals and at bedtime  insulin lispro Injectable (ADMELOG) 3 Unit(s) SubCutaneous three times a day before meals  latanoprost 0.005% Ophthalmic Solution 1 Drop(s) Both EYES at bedtime  melatonin 5 milliGRAM(s) Oral at bedtime PRN  methylPREDNISolone sodium succinate Injectable 40 milliGRAM(s) IV Push every 12 hours  oxycodone    5 mG/acetaminophen 325 mG 1 Tablet(s) Oral every 6 hours PRN  QUEtiapine 25 milliGRAM(s) Oral daily  senna 2 Tablet(s) Oral at bedtime PRN  traZODone 150 milliGRAM(s) Oral at bedtime    ALBUTerol    90 MICROgram(s) HFA Inhaler 2 Puff(s) Inhalation every 6 hours  ALPRAZolam 0.5 milliGRAM(s) Oral two times a day PRN  aspirin enteric coated 81 milliGRAM(s) Oral daily  benzocaine 15 mG/menthol 3.6 mG Lozenge 1 Lozenge Oral two times a day  chlorhexidine 2% Cloths 1 Application(s) Topical <User Schedule>  enoxaparin Injectable 40 milliGRAM(s) SubCutaneous every 24 hours  insulin glargine Injectable (LANTUS) 8 Unit(s) SubCutaneous at bedtime  insulin lispro (ADMELOG) corrective regimen sliding scale   SubCutaneous Before meals and at bedtime  insulin lispro Injectable (ADMELOG) 3 Unit(s) SubCutaneous three times a day before meals  ipratropium 17 MICROgram(s) HFA Inhaler 1 Puff(s) Inhalation every 6 hours  latanoprost 0.005% Ophthalmic Solution 1 Drop(s) Both EYES at bedtime  melatonin 5 milliGRAM(s) Oral at bedtime PRN  methylPREDNISolone sodium succinate Injectable 40 milliGRAM(s) IV Push every 12 hours  oxycodone    5 mG/acetaminophen 325 mG 1 Tablet(s) Oral every 6 hours PRN  QUEtiapine 25 milliGRAM(s) Oral daily  senna 2 Tablet(s) Oral at bedtime PRN  traZODone 150 milliGRAM(s) Oral at bedtime    Drug Dosing Weight  Height (cm): 172.7 (09 Mar 2022 10:55)  Weight (kg): 101.1 (09 Mar 2022 22:00)  BMI (kg/m2): 33.9 (09 Mar 2022 22:00)  BSA (m2): 2.14 (09 Mar 2022 22:00)    CENTRAL LINE: [ ] YES [ ] NO  LOCATION:   DATE INSERTED:  REMOVE: [ ] YES [ ] NO  EXPLAIN:    AMOR: [ ] YES [ ] NO    DATE INSERTED:  REMOVE:  [ ] YES [ ] NO  EXPLAIN:    PAST MEDICAL & SURGICAL HISTORY:  COPD (chronic obstructive pulmonary disease)  Oxygen 2-3L at home    Stented coronary artery  2017    HLD (hyperlipidemia)    Pulmonary HTN    Type 2 diabetes mellitus without complication, with long-term current use of insulin    Coronary artery disease involving native coronary artery of native heart without angina pectoris    Smoker    Gastroesophageal reflux disease, esophagitis presence not specified    Oxygen dependent    DM (diabetes mellitus)    CAD (coronary artery disease)    GERD (gastroesophageal reflux disease)    Insomnia    CHF (congestive heart failure)    HTN (hypertension)    Mood disorder    No significant past surgical history                03-13 @ 07:01  -  03-14 @ 07:00  --------------------------------------------------------  IN: 0 mL / OUT: 805 mL / NET: -805 mL            PHYSICAL EXAM:    GENERAL: NAD, well-groomed, well-developed  HEAD:  Atraumatic, Normocephalic  EYES: EOMI, PERRLA, conjunctiva and sclera clear  ENMT: No tonsillar erythema, exudates, or enlargement; Moist mucous membranes, Good dentition, No lesions  NECK: Supple, No JVD, Normal thyroid  NERVOUS SYSTEM:  Alert & Oriented X3, Good concentration; Motor Strength 5/5 B/L upper and lower extremities; DTRs 2+ intact and symmetric  CHEST/LUNG: Clear to percussion bilaterally; No rales, rhonchi, wheezing, or rubs  HEART: Regular rate and rhythm; No murmurs, rubs, or gallops  ABDOMEN: Soft, Nontender, Nondistended; Bowel sounds present  EXTREMITIES:  2+ Peripheral Pulses, No clubbing, cyanosis, or edema  LYMPH: No lymphadenopathy noted  SKIN: No rashes or lesions      LABS:  CBC Full  -  ( 13 Mar 2022 04:15 )  WBC Count : 9.82 K/uL  RBC Count : 4.27 M/uL  Hemoglobin : 12.2 g/dL  Hematocrit : 40.2 %  Platelet Count - Automated : 142 K/uL  Mean Cell Volume : 94.1 fl  Mean Cell Hemoglobin : 28.6 pg  Mean Cell Hemoglobin Concentration : 30.3 gm/dL  Auto Neutrophil # : 7.21 K/uL  Auto Lymphocyte # : 1.58 K/uL  Auto Monocyte # : 0.93 K/uL  Auto Eosinophil # : 0.05 K/uL  Auto Basophil # : 0.01 K/uL  Auto Neutrophil % : 73.4 %  Auto Lymphocyte % : 16.1 %  Auto Monocyte % : 9.5 %  Auto Eosinophil % : 0.5 %  Auto Basophil % : 0.1 %    03-13    139  |  99  |  17  ----------------------------<  136<H>  3.9   |  38<H>  |  0.44<L>    Ca    8.5      13 Mar 2022 04:15  Phos  3.4     03-13  Mg     2.0     03-13                [  ]  DVT Prophylaxis  [  ]  Nutrition, Brand, Rate         Goal Rate        Abnormal Nutritional Status -  Malnutrition   Cachexia      Morbid Obesity BMI >/=40    RADIOLOGY & ADDITIONAL STUDIES:  ***    Goals of Care Discussion with Family/Proxy/Other   - see note from/family meeting set up for...     DEVIKA CARBALLO    SCU progress note    INTERVAL HPI/OVERNIGHT EVENTS: No acute events, sating well on 3L via NC.      DNR [ ]   DNI  [  ]- FULL CODE    Covid - 19 PCR: COVID-19 PCR: Juan (14 Mar 2022 11:17)          The 4Ms    What Matters Most: see GOC  Age appropriate Medications/Screen for High Risk Medication: Yes  Mentation: see CAM below  Mobility: defer to physical exam    The Confusion Assessment Method (CAM) Diagnostic Algorithm     1: Acute Onset or Fluctuating Course  - Is there evidence of an acute change in mental status from the patient’s baseline? Did the (abnormal) behavior  fluctuate during the day, that is, tend to come and go, or increase and decrease in severity?  [ ] YES [x ] NO     2: Inattention  - Did the patient have difficulty focusing attention, being easily distractible, or having difficulty keeping track of what was being said?  [ ] YES [x ] NO     3: Disorganized thinking  -Was the patient’s thinking disorganized or incoherent, such as rambling or irrelevant conversation, unclear or illogical flow of ideas, or unpredictable switching from subject to subject?  [ ] YES [x ] NO    4: Altered Level of consciousness?  [ ] YES [x ] NO    The diagnosis of delirium by CAM requires the presence of features 1 and 2 and either 3 or 4.    PRESSORS: [ ] YES [x ] NO    Cardiovascular:  Heart Failure  Acute   Acute on Chronic  Chronic         Pulmonary:  ALBUTerol    90 MICROgram(s) HFA Inhaler 2 Puff(s) Inhalation every 6 hours  ipratropium 17 MICROgram(s) HFA Inhaler 1 Puff(s) Inhalation every 6 hours    Hematalogic:  aspirin enteric coated 81 milliGRAM(s) Oral daily  enoxaparin Injectable 40 milliGRAM(s) SubCutaneous every 24 hours    Other:  ALPRAZolam 0.5 milliGRAM(s) Oral two times a day PRN  benzocaine 15 mG/menthol 3.6 mG Lozenge 1 Lozenge Oral two times a day  chlorhexidine 2% Cloths 1 Application(s) Topical <User Schedule>  insulin glargine Injectable (LANTUS) 8 Unit(s) SubCutaneous at bedtime  insulin lispro (ADMELOG) corrective regimen sliding scale   SubCutaneous Before meals and at bedtime  insulin lispro Injectable (ADMELOG) 3 Unit(s) SubCutaneous three times a day before meals  latanoprost 0.005% Ophthalmic Solution 1 Drop(s) Both EYES at bedtime  melatonin 5 milliGRAM(s) Oral at bedtime PRN  methylPREDNISolone sodium succinate Injectable 40 milliGRAM(s) IV Push every 12 hours  oxycodone    5 mG/acetaminophen 325 mG 1 Tablet(s) Oral every 6 hours PRN  QUEtiapine 25 milliGRAM(s) Oral daily  senna 2 Tablet(s) Oral at bedtime PRN  traZODone 150 milliGRAM(s) Oral at bedtime    ALBUTerol    90 MICROgram(s) HFA Inhaler 2 Puff(s) Inhalation every 6 hours  ALPRAZolam 0.5 milliGRAM(s) Oral two times a day PRN  aspirin enteric coated 81 milliGRAM(s) Oral daily  benzocaine 15 mG/menthol 3.6 mG Lozenge 1 Lozenge Oral two times a day  chlorhexidine 2% Cloths 1 Application(s) Topical <User Schedule>  enoxaparin Injectable 40 milliGRAM(s) SubCutaneous every 24 hours  insulin glargine Injectable (LANTUS) 8 Unit(s) SubCutaneous at bedtime  insulin lispro (ADMELOG) corrective regimen sliding scale   SubCutaneous Before meals and at bedtime  insulin lispro Injectable (ADMELOG) 3 Unit(s) SubCutaneous three times a day before meals  ipratropium 17 MICROgram(s) HFA Inhaler 1 Puff(s) Inhalation every 6 hours  latanoprost 0.005% Ophthalmic Solution 1 Drop(s) Both EYES at bedtime  melatonin 5 milliGRAM(s) Oral at bedtime PRN  methylPREDNISolone sodium succinate Injectable 40 milliGRAM(s) IV Push every 12 hours  oxycodone    5 mG/acetaminophen 325 mG 1 Tablet(s) Oral every 6 hours PRN  QUEtiapine 25 milliGRAM(s) Oral daily  senna 2 Tablet(s) Oral at bedtime PRN  traZODone 150 milliGRAM(s) Oral at bedtime    Drug Dosing Weight  Height (cm): 172.7 (09 Mar 2022 10:55)  Weight (kg): 101.1 (09 Mar 2022 22:00)  BMI (kg/m2): 33.9 (09 Mar 2022 22:00)  BSA (m2): 2.14 (09 Mar 2022 22:00)    CENTRAL LINE: [ ] YES [x ] NO  LOCATION:   DATE INSERTED:  REMOVE: [ ] YES [ ] NO  EXPLAIN:    AMOR: [ ] YES [x ] NO    DATE INSERTED:  REMOVE:  [ ] YES [ ] NO  EXPLAIN:    PAST MEDICAL & SURGICAL HISTORY:  COPD (chronic obstructive pulmonary disease)  Oxygen 2-3L at home    Stented coronary artery  2017    HLD (hyperlipidemia)    Pulmonary HTN    Type 2 diabetes mellitus without complication, with long-term current use of insulin    Coronary artery disease involving native coronary artery of native heart without angina pectoris    Smoker    Gastroesophageal reflux disease, esophagitis presence not specified    Oxygen dependent    DM (diabetes mellitus)    CAD (coronary artery disease)    GERD (gastroesophageal reflux disease)    Insomnia    CHF (congestive heart failure)    HTN (hypertension)    Mood disorder    No significant past surgical history                03-13 @ 07:01  -  03-14 @ 07:00  --------------------------------------------------------  IN: 0 mL / OUT: 805 mL / NET: -805 mL            PHYSICAL EXAM:    GENERAL: Resting in bed comfortably, well-groomed, well-developed  HEAD:  Atraumatic, Normocephalic  EYES: EOMI, PERRLA, conjunctiva and sclera clear  ENMT: No tonsillar erythema, exudates, or enlargement; Moist mucous membranes, Good dentition, No lesions  NECK: Supple, No JVD, Normal thyroid  NERVOUS SYSTEM:  Alert & Oriented X3, Good concentration; Motor Strength 5/5 B/L upper and lower extremities; DTRs 2+ intact and symmetric  CHEST/LUNG: moving air b/l w/o increase wob, No rales, rhonchi, wheezing, or rubs  HEART: Regular rate and rhythm; No murmurs, rubs, or gallops  ABDOMEN: Soft, Nontender, Nondistended; Bowel sounds present  EXTREMITIES:  2+ Peripheral Pulses, No clubbing, cyanosis, or edema  LYMPH: No lymphadenopathy noted  SKIN: No rashes or lesions      LABS:  CBC Full  -  ( 13 Mar 2022 04:15 )  WBC Count : 9.82 K/uL  RBC Count : 4.27 M/uL  Hemoglobin : 12.2 g/dL  Hematocrit : 40.2 %  Platelet Count - Automated : 142 K/uL  Mean Cell Volume : 94.1 fl  Mean Cell Hemoglobin : 28.6 pg  Mean Cell Hemoglobin Concentration : 30.3 gm/dL  Auto Neutrophil # : 7.21 K/uL  Auto Lymphocyte # : 1.58 K/uL  Auto Monocyte # : 0.93 K/uL  Auto Eosinophil # : 0.05 K/uL  Auto Basophil # : 0.01 K/uL  Auto Neutrophil % : 73.4 %  Auto Lymphocyte % : 16.1 %  Auto Monocyte % : 9.5 %  Auto Eosinophil % : 0.5 %  Auto Basophil % : 0.1 %    03-13    139  |  99  |  17  ----------------------------<  136<H>  3.9   |  38<H>  |  0.44<L>    Ca    8.5      13 Mar 2022 04:15  Phos  3.4     03-13  Mg     2.0     03-13                [  ]  DVT Prophylaxis  [  ]  Nutrition, Brand, Rate         Goal Rate        Abnormal Nutritional Status -  Malnutrition   Cachexia      Morbid Obesity BMI >/=40    RADIOLOGY & ADDITIONAL STUDIES:  ***    Goals of Care Discussion with Family/Proxy/Other   - see note from/family meeting set up for...

## 2022-03-14 NOTE — PROGRESS NOTE ADULT - PROBLEM SELECTOR PLAN 7
#Anxiety/? Mood disorder  C/w home meds, Trazadone and PRN xanax (increased to 0.5)  Continue quetiapine which was added in ICU for mood stabilization C/w home meds, Trazadone and PRN xanax (increased to 0.5)  Continue quetiapine which was added in ICU for mood stabilization

## 2022-03-14 NOTE — PROGRESS NOTE ADULT - PROBLEM SELECTOR PLAN 2
Continue Methylprednisolone 40mg IV q12h   Received Ceftriaxone/Azithro 3/9-3/10) for presumed CAP however d/cd given low suspicion for pneumonia and pt from NH  Continue albuterol and symbicort Methylprednisolone 40mg IV q12h switched to oral prednisone   Continue albuterol and Symbicort  Received Ceftriaxone/Azithro 3/9-3/10) for presumed CAP however d/cd given low suspicion for pneumonia and pt from NH

## 2022-03-14 NOTE — PROGRESS NOTE ADULT - PROBLEM SELECTOR PLAN 8
#Prophylaxis  - Lovenox/SCDs for DVT ppx  - d/c mark catheter    #glaucoma  - restart latanoprost eye gtt bedtime nightly    #Discharge Planning  - Likely d/c back to NH  - Will need nightly BiPAP Lovenox/SCDs for DVT ppx  Pantoprazole 40mg QD while on Lovenox/ASA and prednisone   D/c back to NH vs [facility] pending PT rec  Will need nightly BiPAP

## 2022-03-14 NOTE — PROGRESS NOTE ADULT - PROBLEM SELECTOR PLAN 4
#DM; hyperglycemia exacerbated by steroids  Hb A1c 7.1  FSBG before meals and at bedtime  Continue lantus w/ insulin sliding scale and pre-meal adelmalog  Will adjust insulin sliding scale PRN  CHO diet Hyperglycemia exacerbated by steroids  Hb A1c 7.1  FSBG before meals and at bedtime  Continue lantus w/ insulin sliding scale and pre-meal Admelog  Adjust insulin sliding scale PRN

## 2022-03-14 NOTE — CHART NOTE - NSCHARTNOTESELECT_GEN_ALL_CORE
Family Update/Event Note
family unable to contact/Event Note
Event Note
Nutrition Services
Transfer Note

## 2022-03-15 ENCOUNTER — TRANSCRIPTION ENCOUNTER (OUTPATIENT)
Age: 62
End: 2022-03-15

## 2022-03-15 VITALS
SYSTOLIC BLOOD PRESSURE: 117 MMHG | RESPIRATION RATE: 19 BRPM | HEART RATE: 76 BPM | TEMPERATURE: 98 F | DIASTOLIC BLOOD PRESSURE: 60 MMHG | OXYGEN SATURATION: 100 %

## 2022-03-15 PROCEDURE — 84300 ASSAY OF URINE SODIUM: CPT

## 2022-03-15 PROCEDURE — 83880 ASSAY OF NATRIURETIC PEPTIDE: CPT

## 2022-03-15 PROCEDURE — 87640 STAPH A DNA AMP PROBE: CPT

## 2022-03-15 PROCEDURE — 84484 ASSAY OF TROPONIN QUANT: CPT

## 2022-03-15 PROCEDURE — 83735 ASSAY OF MAGNESIUM: CPT

## 2022-03-15 PROCEDURE — 87635 SARS-COV-2 COVID-19 AMP PRB: CPT

## 2022-03-15 PROCEDURE — 94660 CPAP INITIATION&MGMT: CPT

## 2022-03-15 PROCEDURE — 99285 EMERGENCY DEPT VISIT HI MDM: CPT

## 2022-03-15 PROCEDURE — 83605 ASSAY OF LACTIC ACID: CPT

## 2022-03-15 PROCEDURE — 84100 ASSAY OF PHOSPHORUS: CPT

## 2022-03-15 PROCEDURE — 82570 ASSAY OF URINE CREATININE: CPT

## 2022-03-15 PROCEDURE — 36415 COLL VENOUS BLD VENIPUNCTURE: CPT

## 2022-03-15 PROCEDURE — 94002 VENT MGMT INPAT INIT DAY: CPT

## 2022-03-15 PROCEDURE — 82330 ASSAY OF CALCIUM: CPT

## 2022-03-15 PROCEDURE — 93306 TTE W/DOPPLER COMPLETE: CPT

## 2022-03-15 PROCEDURE — 74019 RADEX ABDOMEN 2 VIEWS: CPT

## 2022-03-15 PROCEDURE — 84132 ASSAY OF SERUM POTASSIUM: CPT

## 2022-03-15 PROCEDURE — 85025 COMPLETE CBC W/AUTO DIFF WBC: CPT

## 2022-03-15 PROCEDURE — 83036 HEMOGLOBIN GLYCOSYLATED A1C: CPT

## 2022-03-15 PROCEDURE — 80053 COMPREHEN METABOLIC PANEL: CPT

## 2022-03-15 PROCEDURE — 82962 GLUCOSE BLOOD TEST: CPT

## 2022-03-15 PROCEDURE — 85610 PROTHROMBIN TIME: CPT

## 2022-03-15 PROCEDURE — 97162 PT EVAL MOD COMPLEX 30 MIN: CPT

## 2022-03-15 PROCEDURE — 80048 BASIC METABOLIC PNL TOTAL CA: CPT

## 2022-03-15 PROCEDURE — 94640 AIRWAY INHALATION TREATMENT: CPT

## 2022-03-15 PROCEDURE — 71045 X-RAY EXAM CHEST 1 VIEW: CPT

## 2022-03-15 PROCEDURE — 82803 BLOOD GASES ANY COMBINATION: CPT

## 2022-03-15 PROCEDURE — 84295 ASSAY OF SERUM SODIUM: CPT

## 2022-03-15 PROCEDURE — 87641 MR-STAPH DNA AMP PROBE: CPT

## 2022-03-15 PROCEDURE — 94003 VENT MGMT INPAT SUBQ DAY: CPT

## 2022-03-15 PROCEDURE — 82550 ASSAY OF CK (CPK): CPT

## 2022-03-15 PROCEDURE — 85730 THROMBOPLASTIN TIME PARTIAL: CPT

## 2022-03-15 PROCEDURE — 85379 FIBRIN DEGRADATION QUANT: CPT

## 2022-03-15 PROCEDURE — 83935 ASSAY OF URINE OSMOLALITY: CPT

## 2022-03-15 PROCEDURE — 82553 CREATINE MB FRACTION: CPT

## 2022-03-15 RX ORDER — OXYCODONE AND ACETAMINOPHEN 5; 325 MG/1; MG/1
1 TABLET ORAL
Qty: 12 | Refills: 0
Start: 2022-03-15 | End: 2022-03-17

## 2022-03-15 RX ORDER — NYSTATIN 500MM UNIT
5 POWDER (EA) MISCELLANEOUS
Qty: 0 | Refills: 0 | DISCHARGE

## 2022-03-15 RX ORDER — INSULIN NPH HUM/REG INSULIN HM 70-30/ML
44 VIAL (ML) SUBCUTANEOUS
Qty: 0 | Refills: 0 | DISCHARGE

## 2022-03-15 RX ORDER — INSULIN LISPRO 100/ML
3 VIAL (ML) SUBCUTANEOUS
Qty: 1 | Refills: 0
Start: 2022-03-15 | End: 2022-04-13

## 2022-03-15 RX ORDER — INSULIN GLARGINE 100 [IU]/ML
8 INJECTION, SOLUTION SUBCUTANEOUS
Qty: 1 | Refills: 0
Start: 2022-03-15 | End: 2022-04-13

## 2022-03-15 RX ORDER — SODIUM CHLORIDE 0.65 %
2 AEROSOL, SPRAY (ML) NASAL
Qty: 0 | Refills: 0 | DISCHARGE

## 2022-03-15 RX ORDER — INSULIN ASPART 100 [IU]/ML
0 INJECTION, SOLUTION SUBCUTANEOUS
Qty: 0 | Refills: 0 | DISCHARGE

## 2022-03-15 RX ADMIN — OXYCODONE AND ACETAMINOPHEN 1 TABLET(S): 5; 325 TABLET ORAL at 12:17

## 2022-03-15 RX ADMIN — Medication 81 MILLIGRAM(S): at 08:41

## 2022-03-15 RX ADMIN — OXYCODONE AND ACETAMINOPHEN 1 TABLET(S): 5; 325 TABLET ORAL at 06:50

## 2022-03-15 RX ADMIN — OXYCODONE AND ACETAMINOPHEN 1 TABLET(S): 5; 325 TABLET ORAL at 12:57

## 2022-03-15 RX ADMIN — Medication 3: at 06:06

## 2022-03-15 RX ADMIN — ALBUTEROL 2 PUFF(S): 90 AEROSOL, METERED ORAL at 08:39

## 2022-03-15 RX ADMIN — Medication 5 MILLIGRAM(S): at 00:38

## 2022-03-15 RX ADMIN — OXYCODONE AND ACETAMINOPHEN 1 TABLET(S): 5; 325 TABLET ORAL at 06:06

## 2022-03-15 RX ADMIN — Medication 1 PUFF(S): at 07:51

## 2022-03-15 RX ADMIN — ENOXAPARIN SODIUM 40 MILLIGRAM(S): 100 INJECTION SUBCUTANEOUS at 06:02

## 2022-03-15 RX ADMIN — QUETIAPINE FUMARATE 25 MILLIGRAM(S): 200 TABLET, FILM COATED ORAL at 08:42

## 2022-03-15 RX ADMIN — Medication 8: at 12:17

## 2022-03-15 RX ADMIN — Medication 3 UNIT(S): at 12:18

## 2022-03-15 RX ADMIN — Medication 40 MILLIGRAM(S): at 06:02

## 2022-03-15 RX ADMIN — BENZOCAINE AND MENTHOL 1 LOZENGE: 5; 1 LIQUID ORAL at 06:01

## 2022-03-15 RX ADMIN — CHLORHEXIDINE GLUCONATE 1 APPLICATION(S): 213 SOLUTION TOPICAL at 06:02

## 2022-03-15 RX ADMIN — Medication 3 UNIT(S): at 08:00

## 2022-03-15 RX ADMIN — PANTOPRAZOLE SODIUM 40 MILLIGRAM(S): 20 TABLET, DELAYED RELEASE ORAL at 06:06

## 2022-03-15 NOTE — PROGRESS NOTE ADULT - PROBLEM SELECTOR PLAN 1
S/p intubation 3/10-3/11  Now on 3- 4L NC during day; nightly BiPAP   TTE 3/10 showed nml normal EF but pulm HTN with RV pressure 53mmHg, , CXR with hilar congestion; was on Lasix at home  Does not appear overload  continue to monitor off diuretic for now secondary to COPD exacerbation  S/p intubation 3/10-3/11  Now on 3- 4L NC during day; nightly BiPAP   TTE 3/10 showed nml normal EF but pulm HTN with RV pressure 53mmHg, , CXR with hilar congestion; was on Lasix at home  Does not appear overload

## 2022-03-15 NOTE — PROGRESS NOTE ADULT - PROBLEM SELECTOR PLAN 8
Lovenox/SCDs for DVT ppx  Pantoprazole 40mg QD while on Lovenox/ASA and prednisone   D/c back to NH vs [facility] pending PT rec  Will need nightly BiPAP Lovenox/SCDs for DVT ppx  Pantoprazole 40mg QD while on Lovenox/ASA and prednisone   Will need nightly BiPAP  discharge to [facility]

## 2022-03-15 NOTE — DISCHARGE NOTE NURSING/CASE MANAGEMENT/SOCIAL WORK - NSDCPEWEB_GEN_ALL_CORE
[facility] for Tobacco Control website --- [email or web address] website --- [email or web address]

## 2022-03-15 NOTE — PROGRESS NOTE ADULT - PROBLEM SELECTOR PLAN 3
Swallow eval performed 3/11/22 + dysphagia, cleared for minced and most diet however evaluation was performed day of extubation; now patient clinically improved  Consistent carb diet easy to chew

## 2022-03-15 NOTE — DISCHARGE NOTE NURSING/CASE MANAGEMENT/SOCIAL WORK - NSDCPEFALRISK_GEN_ALL_CORE
For information on Fall & Injury Prevention, visit: [email or web address] OR  [email or web address] OR  https://www.cdc.gov/steadi/patient.html

## 2022-03-15 NOTE — PROGRESS NOTE ADULT - ATTENDING COMMENTS
IMP: This is a  61 year old man , from NH, active smoker  with f CAD, ?CHF, COPD, peripheral neuropathy, GERD, HTN, HLD, obesity, polyarthritis, Pulmonary HTN, TIA, Vit D Def came with chief complain of shortness of breath.   RRT called in ED due to worsening SOB and hypoxia. Patient placed on BiPAP and given solumedrol 125mg. Patient to be transferred to ICU for closer monitoring.  Pat admitted for acute on chronic hypoxic hypercapnic resp failure requiring BiPaP and supp O2.  Over night pat was orally intubated in ICU after failing BiPaP     Assessment:  - Acute on Chronic  hypoxic hypercapnic respiratory failure   - COPD exacerbation   - CHF   - Respiratory acidosis   - HTN  - HLD  - CAD  - GERD  - Pulmonary HTN   - Obesity   - Mood disorder   - DM -2 uncontrol   - Hyperkalemia     Plan:    - Continue Sedation and pain control   - Continue vent support   - ETT adjusted   - Adjust vent as per ABG   - Solumedrol   - Combivent neb   - No need for antibx   - CT chest with contrast to eval b/l emre adenopathy   - Hemodynamic support   - Vasopressors titrate to maintain MAP>65 ( due to sedation and pain meds)   - NGT for feed   - Monitor blood sugar with coverage   - Monitor I/O  - Tejada's cath   - DVT GI prophy
IMP: This is a  61 year old man , from NH, active smoker  with f CAD, ?CHF, COPD, peripheral neuropathy, GERD, HTN, HLD, obesity, polyarthritis, Pulmonary HTN, TIA, Vit D Def came with chief complain of shortness of breath.   RRT called in ED due to worsening SOB and hypoxia. Patient placed on BiPAP and given solumedrol 125mg. Patient to be transferred to ICU for closer monitoring.  Pat admitted for acute on chronic hypoxic hypercapnic resp failure requiring BiPaP and supp O2.  Over night pat was orally intubated in ICU after failing BiPaP     Assessment:  - Acute on Chronic  hypoxic hypercapnic respiratory failure   - COPD exacerbation   - CHF   - Respiratory acidosis   - HTN  - HLD  - CAD  - GERD  - Pulmonary HTN   - Obesity   - Mood disorder   - DM -2 uncontrol   - Hyperkalemia     Plan:    - Extubated 3/11  - BiPaP as needed .. pat is refusing   - Solumedrol taper  - Combivent inhaler   - No need for antibx   - CT chest with contrast to eval b/l emre adenopathy   - Hemodynamic monitoring   - Diet as tolerated   - Monitor blood sugar with coverage   - Monitor I/O  - Tejada's cath   - DVT GI prophy.   - Resume out pat  meds.
IMP: This is a  61 year old man , from NH, active smoker  with f CAD, ?CHF, COPD, peripheral neuropathy, GERD, HTN, HLD, obesity, polyarthritis, Pulmonary HTN, TIA, Vit D Def came with chief complain of shortness of breath.   RRT called in ED due to worsening SOB and hypoxia. Patient placed on BiPAP and given solumedrol 125mg. Patient to be transferred to ICU for closer monitoring.  Pat admitted for acute on chronic hypoxic hypercapnic resp failure requiring BiPaP and supp O2.  Over night pat was orally intubated in ICU after failing BiPaP     Assessment:  - Acute on Chronic  hypoxic hypercapnic respiratory failure   - COPD exacerbation   - CHF   - Respiratory acidosis   - HTN  - HLD  - CAD  - GERD  - Pulmonary HTN   - Obesity   - Mood disorder   - DM -2 uncontrol   - Hyperkalemia     Plan:    - Decrease Sedation and pain control   - SAT SBT  and possible extubation   - Adjust vent as per ABG   - Solumedrol   - Combivent neb   - No need for antibx   - CT chest with contrast to eval b/l emre adenopathy   - Hemodynamic support   - Vasopressors titrate to maintain MAP>65 ( due to sedation and pain meds)   - NGT for feed   - Monitor blood sugar with coverage   - Monitor I/O  - Tejada's cath   - DVT GI prophy.
IMP: This is a  61 year old man , from NH, active smoker  with f CAD, ?CHF, COPD, peripheral neuropathy, GERD, HTN, HLD, obesity, polyarthritis, Pulmonary HTN, TIA, Vit D Def came with chief complain of shortness of breath.   RRT called in ED due to worsening SOB and hypoxia. Patient placed on BiPAP and given solumedrol 125mg. Patient to be transferred to ICU for closer monitoring.  Pat admitted for acute on chronic hypoxic hypercapnic resp failure requiring BiPaP and supp O2.  Over night pat was orally intubated in ICU after failing BiPaP     Assessment:  - Acute on Chronic  hypoxic hypercapnic respiratory failure   - COPD exacerbation   - CHF   - Respiratory acidosis   - HTN  - HLD  - CAD  - GERD  - Pulmonary HTN   - Obesity   - Mood disorder   - DM -2 uncontrol   - Hyperkalemia     Plan:    - Extubated 3/11  - BiPaP as needed .. pat is refusing   - Solumedrol   - Combivent inhaler   - No need for antibx   - CT chest with contrast to eval b/l emre adenopathy   - Hemodynamic monitoring   - Diet as tolerated   - Monitor blood sugar with coverage   - Monitor I/O  - Tejada's cath   - DVT GI prophy.   - Resume out pat  meds
refer to icu note today
Discharge today
Awaiting PT recs  Cont. nocturnal bipap support   Tapering dose of steroids  Discharge planning to [facility] vs. assisted living

## 2022-03-15 NOTE — PROGRESS NOTE ADULT - PROBLEM SELECTOR PLAN 2
Methylprednisolone 40mg IV q12h switched to oral prednisone   Continue albuterol and Symbicort  Received Ceftriaxone/Azithro 3/9-3/10) for presumed CAP however d/cd given low suspicion for pneumonia and pt from NH Methylprednisolone 40mg IV q12h switched to oral prednisone   Continue albuterol and Symbicort  Received Ceftriaxone/Azithro 3/9-3/10) for presumed CAP however d/cd given low suspicion for pneumonia and pt from NH  not in exacerbation

## 2022-03-15 NOTE — PROGRESS NOTE ADULT - ASSESSMENT
Patient is a 61 year old Male, from NH, active smoker PMHx of CAD, CHF on BIPAP at NH, COPD (intubation in past), peripheral neuropathy, GERD, HTN, HLD, obesity, polyarthritis, Pulmonary HTN, TIA, Vit D Def. Patient presented to ED with chief complain of shortness of breath for one month. BIPAP machine was malfunctioning in NH. Patient developed worsening work of breathing in ED. CXR: bibasilar/perihilar airspace disease. Patient admitted to ICU for Acute Hypoxic Respiratory Failure secondary to COPD exacerbation. Patient tolerated BIPAP, started on symbicort, duonebs q6, solumedrol BID, ceftriaxone and azithromycin.      ICU COURSE  Patient was persistently agitated, and hence intubated for airway protection.  Maintained on minimal vent settings, and a small dose of peripheral levo to support blood pressure during propofol drip.  Also maintained on solumedrol and MDIs. Abx were DC'd due to low suspicion of infection, despite one record of low grade fever Tmax of 100.5, patient did not have leukocytosis.    Patient was then extubated on 3/11 to BIPAP for a few hours then maintained on BIPAP at night. NC during the day, agitation was resolved hence extubation.  Mood was stable on Seroquel, in addition to home medications: xanax PRN and trazodone.  Patient remained appropriate post extubation and with BIPAP at night with stable vitals in the day, He is optimized for downgrade to medical floors ( AI) for continued management          Patient is a 61 year old Male, from NH, active smoker PMHx of CAD, CHF on BIPAP at NH, COPD (intubation in past), peripheral neuropathy, GERD, HTN, HLD, obesity, polyarthritis, Pulmonary HTN, TIA, Vit D Def. Patient presented to ED with chief complain of shortness of breath for one month. BIPAP machine unavailable at home. Patient developed worsening work of breathing in ED. CXR: bibasilar/perihilar airspace disease. Patient admitted to ICU for Acute Hypoxic Respiratory Failure secondary to COPD exacerbation. Patient tolerated BIPAP, started on symbicort, duonebs q6, solumedrol BID, ceftriaxone and azithromycin.      ICU COURSE  Patient was persistently agitated, and hence intubated for airway protection.   Maintained on minimal vent settings, and a small dose of peripheral levo to support blood pressure during propofol drip, solumedrol and MDIs. Abx were DC'd due to low suspicion of infection, despite one record of low grade fever Tmax of 100.5, patient did not have leukocytosis.   Patient was then extubated on 3/11 to BIPAP for a few hours then maintained on BIPAP at night. NC during the day, agitation was resolved hence extubation.  Mood was stable on Seroquel, in addition to home medications: xanax PRN and trazodone.  Patient remained appropriate post extubation and with BIPAP at night with stable vitals in the day, He is optimized for downgrade to medical floors ( AI) for continued management

## 2022-03-15 NOTE — PROGRESS NOTE ADULT - PROVIDER SPECIALTY LIST ADULT
Critical Care
Internal Medicine
Critical Care
Internal Medicine
Pulmonology
Critical Care

## 2022-03-15 NOTE — PROGRESS NOTE ADULT - SUBJECTIVE AND OBJECTIVE BOX
DEVIKA CARBALLO    SCU progress note    INTERVAL HPI/OVERNIGHT EVENTS: *** Patient seen and examined at bedside. Patient comfortable sitting up in bed eating breakfast.     DNR [ ]   DNI  [  ] Full code    Covid - 19 PCR: negative 3/14    The 4Ms    What Matters Most: see GOC  Age appropriate Medications/Screen for High Risk Medication: Yes  Mentation: see CAM below  Mobility: defer to physical exam    The Confusion Assessment Method (CAM) Diagnostic Algorithm     1: Acute Onset or Fluctuating Course  - Is there evidence of an acute change in mental status from the patient’s baseline? Did the (abnormal) behavior  fluctuate during the day, that is, tend to come and go, or increase and decrease in severity?  [ ] YES [ x] NO     2: Inattention  - Did the patient have difficulty focusing attention, being easily distractible, or having difficulty keeping track of what was being said?  [ ] YES [ x] NO     3: Disorganized thinking  -Was the patient’s thinking disorganized or incoherent, such as rambling or irrelevant conversation, unclear or illogical flow of ideas, or unpredictable switching from subject to subject?  [ ] YES [x ] NO    4: Altered Level of consciousness?  [ ] YES [x ] NO    The diagnosis of delirium by CAM requires the presence of features 1 and 2 and either 3 or 4.    PRESSORS: [ ] YES [x ] NO    Cardiovascular:  Heart Failure  Acute   Acute on Chronic  Chronic         Pulmonary:  ALBUTerol    90 MICROgram(s) HFA Inhaler 2 Puff(s) Inhalation every 6 hours  ipratropium 17 MICROgram(s) HFA Inhaler 1 Puff(s) Inhalation every 6 hours    Hematalogic:  aspirin enteric coated 81 milliGRAM(s) Oral daily  enoxaparin Injectable 40 milliGRAM(s) SubCutaneous every 24 hours    Other:  ALPRAZolam 0.5 milliGRAM(s) Oral two times a day PRN  benzocaine 15 mG/menthol 3.6 mG Lozenge 1 Lozenge Oral two times a day  chlorhexidine 2% Cloths 1 Application(s) Topical <User Schedule>  insulin glargine Injectable (LANTUS) 8 Unit(s) SubCutaneous at bedtime  insulin lispro (ADMELOG) corrective regimen sliding scale   SubCutaneous Before meals and at bedtime  insulin lispro Injectable (ADMELOG) 3 Unit(s) SubCutaneous three times a day before meals  latanoprost 0.005% Ophthalmic Solution 1 Drop(s) Both EYES at bedtime  melatonin 5 milliGRAM(s) Oral at bedtime PRN  oxycodone    5 mG/acetaminophen 325 mG 1 Tablet(s) Oral every 6 hours PRN  pantoprazole    Tablet 40 milliGRAM(s) Oral before breakfast  predniSONE   Tablet 40 milliGRAM(s) Oral daily  QUEtiapine 25 milliGRAM(s) Oral daily  senna 2 Tablet(s) Oral at bedtime PRN  traZODone 150 milliGRAM(s) Oral at bedtime    ALBUTerol    90 MICROgram(s) HFA Inhaler 2 Puff(s) Inhalation every 6 hours  ALPRAZolam 0.5 milliGRAM(s) Oral two times a day PRN  aspirin enteric coated 81 milliGRAM(s) Oral daily  benzocaine 15 mG/menthol 3.6 mG Lozenge 1 Lozenge Oral two times a day  chlorhexidine 2% Cloths 1 Application(s) Topical <User Schedule>  enoxaparin Injectable 40 milliGRAM(s) SubCutaneous every 24 hours  insulin glargine Injectable (LANTUS) 8 Unit(s) SubCutaneous at bedtime  insulin lispro (ADMELOG) corrective regimen sliding scale   SubCutaneous Before meals and at bedtime  insulin lispro Injectable (ADMELOG) 3 Unit(s) SubCutaneous three times a day before meals  ipratropium 17 MICROgram(s) HFA Inhaler 1 Puff(s) Inhalation every 6 hours  latanoprost 0.005% Ophthalmic Solution 1 Drop(s) Both EYES at bedtime  melatonin 5 milliGRAM(s) Oral at bedtime PRN  oxycodone    5 mG/acetaminophen 325 mG 1 Tablet(s) Oral every 6 hours PRN  pantoprazole    Tablet 40 milliGRAM(s) Oral before breakfast  predniSONE   Tablet 40 milliGRAM(s) Oral daily  QUEtiapine 25 milliGRAM(s) Oral daily  senna 2 Tablet(s) Oral at bedtime PRN  traZODone 150 milliGRAM(s) Oral at bedtime    Drug Dosing Weight  Height (cm): 172.7 (09 Mar 2022 10:55)  Weight (kg): 101.1 (09 Mar 2022 22:00)  BMI (kg/m2): 33.9 (09 Mar 2022 22:00)  BSA (m2): 2.14 (09 Mar 2022 22:00)    CENTRAL LINE: [ ] YES [ x] NO  LOCATION:   DATE INSERTED:  REMOVE: [ ] YES [ ] NO  EXPLAIN:    AMOR: [ ] YES [x ] NO    DATE INSERTED:  REMOVE:  [ ] YES [ ] NO  EXPLAIN:    PAST MEDICAL & SURGICAL HISTORY:  COPD (chronic obstructive pulmonary disease)  Oxygen 2-3L at home    Stented coronary artery  2017    HLD (hyperlipidemia)    Pulmonary HTN    Type 2 diabetes mellitus without complication, with long-term current use of insulin    Coronary artery disease involving native coronary artery of native heart without angina pectoris    Smoker    Gastroesophageal reflux disease, esophagitis presence not specified    Oxygen dependent    DM (diabetes mellitus)    CAD (coronary artery disease)    GERD (gastroesophageal reflux disease)    Insomnia    CHF (congestive heart failure)    HTN (hypertension)    Mood disorder    No significant past surgical history        03-14 @ 07:01  -  03-15 @ 07:00  --------------------------------------------------------  IN: 0 mL / OUT: 2500 mL / NET: -2500 mL      PHYSICAL EXAM:    GENERAL: NAD, well-groomed, well-developed  HEAD:  Atraumatic, Normocephalic  EYES: EOMI, PERRLA, conjunctiva and sclera clear  ENMT: No tonsillar erythema, exudates, or enlargement; Moist mucous membranes, Good dentition, No lesions  NECK: Supple, No JVD, Normal thyroid  NERVOUS SYSTEM:  Alert & Oriented X3, Good concentration; Motor Strength 5/5 B/L upper and lower extremities; DTRs 2+ intact and symmetric  CHEST/LUNG: diminished throughout lung field, comfortable  HEART: Regular rate and rhythm; No murmurs, rubs, or gallops  ABDOMEN: Soft, Nontender, Nondistended; Bowel sounds present  EXTREMITIES:  2+ Peripheral Pulses, No clubbing, cyanosis, or edema  LYMPH: No lymphadenopathy noted  SKIN: No rashes or lesions      LABS:      [ x ]  DVT Prophylaxis  [  ]  Nutrition, Brand, Rate         Goal Rate        Abnormal Nutritional Status -  Malnutrition   Cachexia      Morbid Obesity BMI >/=40    RADIOLOGY & ADDITIONAL STUDIES:  ***    Goals of Care Discussion with Family/Proxy/Other

## 2022-03-15 NOTE — PROGRESS NOTE ADULT - PROBLEM SELECTOR PLAN 5
Continue ASA 81mg daily  BP meds for HTN management as needed; no BP meds noted on outpatient med list

## 2022-03-15 NOTE — DISCHARGE NOTE NURSING/CASE MANAGEMENT/SOCIAL WORK - PATIENT PORTAL LINK FT
You can access the FollowMyHealth Patient Portal offered by [facility] by registering at the following website: [email or web address]. By joining [employer]’s FollowMyHealth portal, you will also be able to view your health information using other applications (apps) compatible with our system.

## 2022-03-15 NOTE — PROGRESS NOTE ADULT - PROBLEM SELECTOR PLAN 7
C/w home meds, Trazadone and PRN xanax (increased to 0.5)  Continue quetiapine which was added in ICU for mood stabilization

## 2022-03-15 NOTE — PROGRESS NOTE ADULT - PROBLEM SELECTOR PLAN 4
Hyperglycemia exacerbated by steroids  Hb A1c 7.1  FSBG before meals and at bedtime  Continue lantus w/ insulin sliding scale and pre-meal Admelog  Adjust insulin sliding scale PRN

## 2022-03-18 ENCOUNTER — INPATIENT (INPATIENT)
Facility: HOSPITAL | Age: 62
LOS: 2 days | Discharge: NURSING, REHAB OR LONG-TERM CARE | DRG: 190 | End: 2022-03-21
Attending: INTERNAL MEDICINE | Admitting: INTERNAL MEDICINE
Payer: MEDICAID

## 2022-03-18 VITALS
OXYGEN SATURATION: 93 % | DIASTOLIC BLOOD PRESSURE: 75 MMHG | RESPIRATION RATE: 19 BRPM | HEART RATE: 107 BPM | TEMPERATURE: 100 F | SYSTOLIC BLOOD PRESSURE: 120 MMHG | WEIGHT: 199.96 LBS | HEIGHT: 68 IN

## 2022-03-18 DIAGNOSIS — J96.01 ACUTE RESPIRATORY FAILURE WITH HYPOXIA: ICD-10-CM

## 2022-03-18 PROBLEM — I50.9 HEART FAILURE, UNSPECIFIED: Chronic | Status: ACTIVE | Noted: 2022-03-09

## 2022-03-18 PROBLEM — I10 ESSENTIAL (PRIMARY) HYPERTENSION: Chronic | Status: ACTIVE | Noted: 2022-03-09

## 2022-03-18 PROBLEM — F39 UNSPECIFIED MOOD [AFFECTIVE] DISORDER: Chronic | Status: ACTIVE | Noted: 2022-03-09

## 2022-03-18 LAB
ALBUMIN SERPL ELPH-MCNC: 3.2 G/DL — LOW (ref 3.5–5)
ALP SERPL-CCNC: 58 U/L — SIGNIFICANT CHANGE UP (ref 40–120)
ALT FLD-CCNC: 59 U/L DA — SIGNIFICANT CHANGE UP (ref 10–60)
ANION GAP SERPL CALC-SCNC: <3 MMOL/L — LOW (ref 5–17)
AST SERPL-CCNC: 7 U/L — LOW (ref 10–40)
BASE EXCESS BLDA CALC-SCNC: 20.6 MMOL/L — HIGH (ref -2–3)
BASE EXCESS BLDA CALC-SCNC: 24.5 MMOL/L — HIGH (ref -2–3)
BASE EXCESS BLDV CALC-SCNC: 23.6 MMOL/L — SIGNIFICANT CHANGE UP
BASOPHILS # BLD AUTO: 0.03 K/UL — SIGNIFICANT CHANGE UP (ref 0–0.2)
BASOPHILS NFR BLD AUTO: 0.2 % — SIGNIFICANT CHANGE UP (ref 0–2)
BILIRUB SERPL-MCNC: 0.4 MG/DL — SIGNIFICANT CHANGE UP (ref 0.2–1.2)
BLOOD GAS COMMENTS ARTERIAL: SIGNIFICANT CHANGE UP
BLOOD GAS COMMENTS ARTERIAL: SIGNIFICANT CHANGE UP
BUN SERPL-MCNC: 18 MG/DL — SIGNIFICANT CHANGE UP (ref 7–18)
CALCIUM SERPL-MCNC: 8.8 MG/DL — SIGNIFICANT CHANGE UP (ref 8.4–10.5)
CHLORIDE SERPL-SCNC: 90 MMOL/L — LOW (ref 96–108)
CO2 SERPL-SCNC: >45 MMOL/L — CRITICAL HIGH (ref 22–31)
CREAT SERPL-MCNC: 0.58 MG/DL — SIGNIFICANT CHANGE UP (ref 0.5–1.3)
EGFR: 112 ML/MIN/1.73M2 — SIGNIFICANT CHANGE UP
EOSINOPHIL # BLD AUTO: 0.08 K/UL — SIGNIFICANT CHANGE UP (ref 0–0.5)
EOSINOPHIL NFR BLD AUTO: 0.6 % — SIGNIFICANT CHANGE UP (ref 0–6)
GLUCOSE SERPL-MCNC: 156 MG/DL — HIGH (ref 70–99)
HCO3 BLDA-SCNC: 53 MMOL/L — CRITICAL HIGH (ref 21–28)
HCO3 BLDA-SCNC: 56 MMOL/L — CRITICAL HIGH (ref 21–28)
HCO3 BLDV-SCNC: 56 MMOL/L — CRITICAL HIGH (ref 22–29)
HCT VFR BLD CALC: 44.6 % — SIGNIFICANT CHANGE UP (ref 39–50)
HGB BLD-MCNC: 13.1 G/DL — SIGNIFICANT CHANGE UP (ref 13–17)
HOROWITZ INDEX BLDA+IHG-RTO: 40 — SIGNIFICANT CHANGE UP
HOROWITZ INDEX BLDA+IHG-RTO: 40 — SIGNIFICANT CHANGE UP
IMM GRANULOCYTES NFR BLD AUTO: 0.8 % — SIGNIFICANT CHANGE UP (ref 0–1.5)
LYMPHOCYTES # BLD AUTO: 1.06 K/UL — SIGNIFICANT CHANGE UP (ref 1–3.3)
LYMPHOCYTES # BLD AUTO: 7.4 % — LOW (ref 13–44)
MCHC RBC-ENTMCNC: 28.5 PG — SIGNIFICANT CHANGE UP (ref 27–34)
MCHC RBC-ENTMCNC: 29.4 GM/DL — LOW (ref 32–36)
MCV RBC AUTO: 97.2 FL — SIGNIFICANT CHANGE UP (ref 80–100)
MONOCYTES # BLD AUTO: 0.85 K/UL — SIGNIFICANT CHANGE UP (ref 0–0.9)
MONOCYTES NFR BLD AUTO: 6 % — SIGNIFICANT CHANGE UP (ref 2–14)
NEUTROPHILS # BLD AUTO: 12.1 K/UL — HIGH (ref 1.8–7.4)
NEUTROPHILS NFR BLD AUTO: 85 % — HIGH (ref 43–77)
NRBC # BLD: 0 /100 WBCS — SIGNIFICANT CHANGE UP (ref 0–0)
NT-PROBNP SERPL-SCNC: 543 PG/ML — HIGH (ref 0–125)
PCO2 BLDA: 107 MMHG — CRITICAL HIGH (ref 35–48)
PCO2 BLDA: 94 MMHG — CRITICAL HIGH (ref 35–48)
PCO2 BLDV: 106 MMHG — HIGH (ref 42–55)
PH BLDA: 7.3 — LOW (ref 7.35–7.45)
PH BLDA: 7.38 — SIGNIFICANT CHANGE UP (ref 7.35–7.45)
PH BLDV: 7.33 — SIGNIFICANT CHANGE UP (ref 7.32–7.43)
PLATELET # BLD AUTO: 185 K/UL — SIGNIFICANT CHANGE UP (ref 150–400)
PO2 BLDA: 76 MMHG — LOW (ref 83–108)
PO2 BLDA: 84 MMHG — SIGNIFICANT CHANGE UP (ref 83–108)
PO2 BLDV: 32 MMHG — SIGNIFICANT CHANGE UP
POTASSIUM SERPL-MCNC: 4.8 MMOL/L — SIGNIFICANT CHANGE UP (ref 3.5–5.3)
POTASSIUM SERPL-SCNC: 4.8 MMOL/L — SIGNIFICANT CHANGE UP (ref 3.5–5.3)
PROT SERPL-MCNC: 6.8 G/DL — SIGNIFICANT CHANGE UP (ref 6–8.3)
RAPID RVP RESULT: SIGNIFICANT CHANGE UP
RBC # BLD: 4.59 M/UL — SIGNIFICANT CHANGE UP (ref 4.2–5.8)
RBC # FLD: 13.2 % — SIGNIFICANT CHANGE UP (ref 10.3–14.5)
SAO2 % BLDA: 97 % — SIGNIFICANT CHANGE UP
SAO2 % BLDA: 98 % — SIGNIFICANT CHANGE UP
SAO2 % BLDV: 61.5 % — SIGNIFICANT CHANGE UP
SARS-COV-2 RNA SPEC QL NAA+PROBE: SIGNIFICANT CHANGE UP
SODIUM SERPL-SCNC: 138 MMOL/L — SIGNIFICANT CHANGE UP (ref 135–145)
TROPONIN I, HIGH SENSITIVITY RESULT: 38.6 NG/L — SIGNIFICANT CHANGE UP
WBC # BLD: 14.23 K/UL — HIGH (ref 3.8–10.5)
WBC # FLD AUTO: 14.23 K/UL — HIGH (ref 3.8–10.5)

## 2022-03-18 PROCEDURE — 99285 EMERGENCY DEPT VISIT HI MDM: CPT

## 2022-03-18 PROCEDURE — 93010 ELECTROCARDIOGRAM REPORT: CPT

## 2022-03-18 PROCEDURE — 71045 X-RAY EXAM CHEST 1 VIEW: CPT | Mod: 26

## 2022-03-18 RX ORDER — BUDESONIDE AND FORMOTEROL FUMARATE DIHYDRATE 160; 4.5 UG/1; UG/1
2 AEROSOL RESPIRATORY (INHALATION)
Refills: 0 | Status: DISCONTINUED | OUTPATIENT
Start: 2022-03-18 | End: 2022-03-21

## 2022-03-18 RX ORDER — ASPIRIN/CALCIUM CARB/MAGNESIUM 324 MG
81 TABLET ORAL DAILY
Refills: 0 | Status: DISCONTINUED | OUTPATIENT
Start: 2022-03-18 | End: 2022-03-21

## 2022-03-18 RX ORDER — AZITHROMYCIN 500 MG/1
TABLET, FILM COATED ORAL
Refills: 0 | Status: DISCONTINUED | OUTPATIENT
Start: 2022-03-18 | End: 2022-03-20

## 2022-03-18 RX ORDER — AZITHROMYCIN 500 MG/1
500 TABLET, FILM COATED ORAL ONCE
Refills: 0 | Status: COMPLETED | OUTPATIENT
Start: 2022-03-18 | End: 2022-03-18

## 2022-03-18 RX ORDER — HEPARIN SODIUM 5000 [USP'U]/ML
5000 INJECTION INTRAVENOUS; SUBCUTANEOUS EVERY 8 HOURS
Refills: 0 | Status: DISCONTINUED | OUTPATIENT
Start: 2022-03-18 | End: 2022-03-21

## 2022-03-18 RX ORDER — SENNA PLUS 8.6 MG/1
2 TABLET ORAL AT BEDTIME
Refills: 0 | Status: DISCONTINUED | OUTPATIENT
Start: 2022-03-18 | End: 2022-03-21

## 2022-03-18 RX ORDER — LATANOPROST 0.05 MG/ML
1 SOLUTION/ DROPS OPHTHALMIC; TOPICAL AT BEDTIME
Refills: 0 | Status: DISCONTINUED | OUTPATIENT
Start: 2022-03-18 | End: 2022-03-21

## 2022-03-18 RX ORDER — ALBUTEROL 90 UG/1
2 AEROSOL, METERED ORAL EVERY 6 HOURS
Refills: 0 | Status: DISCONTINUED | OUTPATIENT
Start: 2022-03-18 | End: 2022-03-21

## 2022-03-18 RX ORDER — CEFTRIAXONE 500 MG/1
1000 INJECTION, POWDER, FOR SOLUTION INTRAMUSCULAR; INTRAVENOUS EVERY 24 HOURS
Refills: 0 | Status: DISCONTINUED | OUTPATIENT
Start: 2022-03-18 | End: 2022-03-18

## 2022-03-18 RX ORDER — CHLORHEXIDINE GLUCONATE 213 G/1000ML
1 SOLUTION TOPICAL
Refills: 0 | Status: DISCONTINUED | OUTPATIENT
Start: 2022-03-18 | End: 2022-03-19

## 2022-03-18 RX ORDER — ALPRAZOLAM 0.25 MG
0.5 TABLET ORAL
Refills: 0 | Status: DISCONTINUED | OUTPATIENT
Start: 2022-03-18 | End: 2022-03-21

## 2022-03-18 RX ORDER — PANTOPRAZOLE SODIUM 20 MG/1
40 TABLET, DELAYED RELEASE ORAL
Refills: 0 | Status: DISCONTINUED | OUTPATIENT
Start: 2022-03-18 | End: 2022-03-21

## 2022-03-18 RX ORDER — SIMETHICONE 80 MG/1
80 TABLET, CHEWABLE ORAL
Refills: 0 | Status: DISCONTINUED | OUTPATIENT
Start: 2022-03-18 | End: 2022-03-21

## 2022-03-18 RX ORDER — AZITHROMYCIN 500 MG/1
500 TABLET, FILM COATED ORAL EVERY 24 HOURS
Refills: 0 | Status: DISCONTINUED | OUTPATIENT
Start: 2022-03-19 | End: 2022-03-20

## 2022-03-18 RX ORDER — GABAPENTIN 400 MG/1
100 CAPSULE ORAL THREE TIMES A DAY
Refills: 0 | Status: DISCONTINUED | OUTPATIENT
Start: 2022-03-18 | End: 2022-03-21

## 2022-03-18 RX ORDER — IPRATROPIUM BROMIDE 0.2 MG/ML
1 SOLUTION, NON-ORAL INHALATION EVERY 6 HOURS
Refills: 0 | Status: DISCONTINUED | OUTPATIENT
Start: 2022-03-18 | End: 2022-03-21

## 2022-03-18 RX ADMIN — Medication 40 MILLIGRAM(S): at 22:29

## 2022-03-18 RX ADMIN — Medication 40 MILLIGRAM(S): at 15:40

## 2022-03-18 RX ADMIN — Medication 81 MILLIGRAM(S): at 18:38

## 2022-03-18 RX ADMIN — SIMETHICONE 80 MILLIGRAM(S): 80 TABLET, CHEWABLE ORAL at 18:39

## 2022-03-18 RX ADMIN — AZITHROMYCIN 255 MILLIGRAM(S): 500 TABLET, FILM COATED ORAL at 15:42

## 2022-03-18 RX ADMIN — CEFTRIAXONE 100 MILLIGRAM(S): 500 INJECTION, POWDER, FOR SOLUTION INTRAMUSCULAR; INTRAVENOUS at 15:30

## 2022-03-18 RX ADMIN — HEPARIN SODIUM 5000 UNIT(S): 5000 INJECTION INTRAVENOUS; SUBCUTANEOUS at 22:29

## 2022-03-18 RX ADMIN — CHLORHEXIDINE GLUCONATE 1 APPLICATION(S): 213 SOLUTION TOPICAL at 18:40

## 2022-03-18 RX ADMIN — Medication 0.5 MILLIGRAM(S): at 18:39

## 2022-03-18 RX ADMIN — LATANOPROST 1 DROP(S): 0.05 SOLUTION/ DROPS OPHTHALMIC; TOPICAL at 22:28

## 2022-03-18 NOTE — ED PROVIDER NOTE - OBJECTIVE STATEMENT
pt with hx of copd here for episode of low oxygen level at [facility] home ([facility])  is on bipap during day and at night  sat 93% on 2 liters in ED

## 2022-03-18 NOTE — ED PROVIDER NOTE - PROGRESS NOTE DETAILS
LESLEY: Patient signed out to me by Dr. Macdonald. awaiting VBG. If bicarb improved, plan is to d/c back to assisted living. VBG as above. I d/w Dr. Ramos. Not a candidate for discharge. He requested an ICU consult as the last time patient was admitted to the floor on bipap, he had a rapid response. Patient accepted to ICU.

## 2022-03-18 NOTE — ED PROVIDER NOTE - CLINICAL SUMMARY MEDICAL DECISION MAKING FREE TEXT BOX
pt with frequent vistis for low oxygen or high co2  according to dr. rangel pt goes out side to smoke in nsg home and sometimes declines bipap due to getting anxious while wearing it   agrees with plan to try a few hours of bipap and repeat vbg if improved will dc home to nsg [place]  if no better or worse will admit.

## 2022-03-18 NOTE — H&P ADULT - ATTENDING COMMENTS
61 year old man , from NH, active smoker, CAD, COPD, peripheral neuropathy, GERD, HTN, HLD, obesity, polyarthritis, Pulmonary HTN, TIA, Vit D Def came with chief complain of shortness of breath. Reported history of non compliance with bipap and continued smoking. In the ED, awake but appears tachypneic. VBG showing acute CO2 retention increased from baseline, likely COPD exacerbation due to continued smoking.     Assessment:  - Acute on Chronic  hypoxic hypercapnic respiratory failure   - COPD exacerbation   - HTN  - HLD  - CAD  - GERD  - Pulmonary HTN   - Obesity   - Mood disorder   - DM -2 uncontrol     Plan:  - Close respiratory monitoring   - Start bipap  - Obtain ABG   - Solumedrol IV  - Bronchodilators RTC  - Azithromycin  - Hemodynamic monitoring   - Diet as tolerated once off bipap  - Monitor blood sugar with coverage with sliding scale insulin  - Cont. psych/pain medications  - DVT GI prophy.   - Admit to ICU

## 2022-03-18 NOTE — H&P ADULT - ASSESSMENT
Patient is a 61 year old male, from NH, active smoker PMHx of CAD, ?CHF, COPD, peripheral neuropathy, GERD, HTN, HLD, obesity, polyarthritis, Pulmonary HTN, came with chief complain of shortness of breath. ICU was consulted for increased worsening hypoxia and increase work of breathing    Assessment:  - Acute hypoxic hypercapnic respiratory failure   - COPD exacerbation    - Respiratory acidosis   - HTN  - HLD  - CAD   - DM type 2  - GERD  - Pulmonary HTN             Plan:  Neuro:  - no acute issues   -Continue xanax home med      Cardiovascular:  #CAD  Patient with hx of CAD s/p stent in 2017 as per chart  Will continue with aspirin and statin      #Pulmonary:    Acute hypoxic hypercapnic respiratory failure likely due to COPD exacerbation    Patient presented due to SOB    patient noted to be tachypneic in ED and placed on BiPAP   continue with Solumedorl 40 IV.   CXR showed b/l vasilar    monitor O2 sat    Albuterol and Symbicort.      #Respiratory acidosis   - noted to have CO2 more 125 on ABG   - patient appears to be chronic retainer normally with CO2 in 40s   - placed on BiPAP with improvement in pH  - continue with BiPAP for now  - repeat ABG in AM     #Infectious Diseases:  - Patient noted with leukocytosis  will continue with Rocephin and Zithromax to empirically cover for pneumonia    #Gastrointestinal:  - will keep NPO for now while on BiPAP    #Renal:  no acute issues  monitor electrolytes    #Heme/onc:   - no acute issues     Endo:   #DM  - A1c 7.4  will start the patient on basal bolus.  adjust insulin as indicated. goal -180.      Skin/ catheter:   - no acute issues     Prophylaxis:   - lovenox for DVT prophylaxis    Goals of Care:   - Full Code     Dispo: Transfer to ICU  Patient is a 61 year old male, from NH, active smoker PMHx of CAD, ?CHF, COPD, peripheral neuropathy, GERD, HTN, HLD, obesity, polyarthritis, Pulmonary HTN, came with chief complain of shortness of breath. ICU was consulted for increased worsening hypoxia and increase work of breathing    Assessment:  - Acute hypoxic hypercapnic respiratory failure   - COPD exacerbation    - Respiratory acidosis   - HTN  - HLD  - CAD   - DM type 2  - GERD  - Pulmonary HTN             Plan:  Neuro:  - no acute issues   -Continue xanax home med      Cardiovascular:  #CAD  Patient with hx of CAD s/p stent in 2017 as per chart  Will continue with aspirin and statin      #Pulmonary:    Acute hypoxic hypercapnic respiratory failure likely due to COPD exacerbation    Patient presented due to SOB    patient noted to be tachypneic in ED and placed on BiPAP   continue with Solumedorl 40 IV.   CXR showed b/l vasilar    monitor O2 sat    Albuterol and Symbicort.      #Respiratory acidosis   - noted to have CO2 more 125 on ABG   - patient appears to be chronic retainer normally with CO2 in 40s   - placed on BiPAP with improvement in pH  - continue with BiPAP for now  - repeat ABG in AM     #Infectious Diseases:  - Patient noted with leukocytosis  will continue with Zithromax for COPD    #Gastrointestinal:  - will keep NPO for now while on BiPAP    #Renal:  no acute issues  monitor electrolytes    #Heme/onc:   - no acute issues     Endo:   #DM  - A1c 7.4  will start the patient on basal bolus.  adjust insulin as indicated. goal -180.      Skin/ catheter:   - no acute issues     Prophylaxis:   - lovenox for DVT prophylaxis    Goals of Care:   - Full Code     Dispo: Transfer to ICU

## 2022-03-18 NOTE — H&P ADULT - NSHPPHYSICALEXAM_GEN_ALL_CORE
ICU Vital Signs Last 24 Hrs  T(C): 37.6 (18 Mar 2022 10:31), Max: 37.6 (18 Mar 2022 10:31)  T(F): 99.6 (18 Mar 2022 10:31), Max: 99.6 (18 Mar 2022 10:31)  HR: 127 (18 Mar 2022 12:16) (107 - 127)  BP: 120/75 (18 Mar 2022 10:31) (120/75 - 120/75)  BP(mean): --  ABP: --  ABP(mean): --  RR: 19 (18 Mar 2022 10:31) (19 - 19)  SpO2: 94% (18 Mar 2022 12:16) (93% - 94%)      GENERAL:  Patient noted to be in respiratory distress  HEAD:  Atraumatic, Normocephalic  EYES: EOMI, PERRLA, conjunctiva and sclera clear  NECK: Supple, No JVD  CHEST/LUNG: Clear to auscultation bilaterally; No wheeze; No crackles; accessory muscle use noted  HEART: Regular rate and rhythm; No murmurs;   ABDOMEN: Soft, Nontender, Nondistended; Bowel sounds present; No guarding  EXTREMITIES:  2+ Peripheral Pulses, No cyanosis or edema  PSYCH:  Normal Affect  NEUROLOGY: non-focal, AAO X 3. Strength is 5/5. no sensory loss.  SKIN: No rashes or lesions

## 2022-03-18 NOTE — PATIENT PROFILE ADULT - FOOD INSECURITY
Internal Medicine Progress Note      Patient: Lucero Apple Date:2019     : 1928 Attending: Benny Jensen MD   89 year old female      2019    CC: Fall, ?syncope      Recent Events:       Subjective: Had diarrhea last night. None so far today. Orthostatics ok today    Consults: Cardiology, EP       Vital Last Value 24 Hour Range   Temperature 98 °F (36.7 °C) Temp  Min: 98 °F (36.7 °C)  Max: 98.7 °F (37.1 °C)   Pulse 70 Pulse  Min: 66  Max: 74   Respiratory 18 Resp  Min: 18  Max: 20   Blood Pressure 132/54 BP  Min: 132/54  Max: 196/79   Arterial BP   No data recorded   O2 Sat   NA   Pulse Oximetry 96 % SpO2  Min: 96 %  Max: 98 %     Vital Today Admitted   Weight 53.8 kg Weight: 55.8 kg   BMI N/A BMI (Calculated): 20.47         Intake/Output:    I/O last 3 completed shifts:  In: 1227 [P.O.:560; I.V.:667]  Out: 975 [Urine:975]    Medications/Infusions:  Scheduled:   • insulin lispro   Subcutaneous TID AC   • metoPROLOL tartrate  25 mg Oral 2 times per day   • aspirin  81 mg Oral Daily   • sodium chloride (PF)  2 mL Injection 2 times per day   • heparin (porcine)  5,000 Units Subcutaneous 3 times per day   • polyethylene glycol  17 g Oral Daily   • cefTRIAXone (ROCEPHIN) IVPB  1,000 mg Intravenous Daily   • B complex-vitamin C-folic acid  1 tablet Oral Daily at noon   • calcium carbonate-vitamin D  1 tablet Oral Daily at noon   • lisinopril  40 mg Oral Daily   • lactobacillus acidophilus  1 tablet Oral Daily at noon   • metFORMIN  500 mg Oral Daily with breakfast   • metFORMIN  1,000 mg Oral Daily with dinner   • vitamin - therapeutic multivitamins w/minerals  1 tablet Oral Daily       Continuous Infusions:   • dextrose 5 % infusion       .  Physical Exam:   General appearance: alert, cooperative  Head: Ecchymosis and hematoma to the face right orbit > left  Neck: Supple, symmetrical, trachea midline  Lungs: clear to auscultation bilaterally  Heart: RRR, S1, S2  normal, no murmur  Abdomen: soft, non-tender; bowel sounds normal  Extremities: no edema, redness or tenderness in the calves or thighs  Pulses: 2+ and symmetric  Skin: Skin color, texture, turgor normal. No rashes or lesions  Neurologic: Alert, oriented x1-2, no focal deficit, chronic visual deficit left eye       Laboratory Results:  No results found for: APH, ACPCO, APO2, AHCO3, ASAT, FIO2  Recent Labs     19  0333 19  0352   SODIUM  --  141   POTASSIUM 4.0 3.6   CHLORIDE  --  110*   CO2  --  23   GLUCOSE  --  158*   BUN  --  15   CREATININE  --  0.56   CALCIUM  --  9.1   MG  --  2.0     Recent Labs     19  0352   WBC 6.3   RBC 4.15   HCT 37.2   HGB 12.4        Lab Results   Component Value Date    PTT 31 (H) 2016    RESR 83 (H) 2012    .0 (H) 2012    RAPDTR <0.02 2016    NH3 <25 2012    CRP 7.1 (H) 2012    OSMO 265 (L) 2016    TSH 0.060 (L) 2019    SARAHI 118 2016    UOSM 325 2016     Urinalysis    Lab Results   Component Value Date    USPG 1.010 2019    UPROT NEGATIVE 2019    WBC 6.3 2019    UWBC LARGE (A) 2019    RBC 4.15 2019    URBC MODERATE (A) 2019    UBILI NEGATIVE 2019    UPH 6.5 2019    UROB 0.2 2019    UBACTR LARGE (A) 2019       Cultures:     Imagin/2 CT cervical spine  No acute bony abnormality.   Presumed multinodular goiter.      CT head and facial bones  Atrophy and white matter small vessel disease without acute intracranial  finding.  No facial bone fracture.      BEST PRACTICES    DVT Prophylaxis:  Heparin      ASSESSMENT:  Unwitnessed fall, unclear if syncope or not  Facial contusions  UTI, POA, E.coli  HTN  Bradycardia- resolved  DM2  CKD3  Dementia     PLAN:  Telemetry monitoring  EP input noted- possible patch monitor planned  Orthostatics ok  BP control- continue ACEi, metoprolol. Add norvasc. Has been requiring PRN  hydralazine  Continue antibiotics for UTI.   Endocrine OP for goiter  BS control- SSI, metformin  PT/OT  F/U labs    DC planning- [facility] placement pending    Family Update: at bedside    Total time spent:     LOCO [place]  6/7/2019  8:13 AM    I personally examined the patient, reviewed the chart  AAO  Facial ecchymosis better  Lungs CTA  Heart- RRR, s1,s2.   Abd is soft. Bowel sounds active.   I agree with  impression and plan as outlined.  D/W family    Esequiel Desir MD    Please page provider during normal business hours.   Use answering service [phone] after 5 pm, before 8 am, and weekends for on-call provider     no

## 2022-03-18 NOTE — PATIENT PROFILE ADULT - FALL HARM RISK - HARM RISK INTERVENTIONS

## 2022-03-18 NOTE — ED PROVIDER NOTE - CARE PLAN
Principal Discharge DX:	Hypercarbia   1 Principal Discharge DX:	Acute respiratory failure with hypoxia and hypercapnia

## 2022-03-19 LAB
ALBUMIN SERPL ELPH-MCNC: 3.2 G/DL — LOW (ref 3.5–5)
ALP SERPL-CCNC: 59 U/L — SIGNIFICANT CHANGE UP (ref 40–120)
ALT FLD-CCNC: 50 U/L DA — SIGNIFICANT CHANGE UP (ref 10–60)
ANION GAP SERPL CALC-SCNC: -2 MMOL/L — LOW (ref 5–17)
AST SERPL-CCNC: 9 U/L — LOW (ref 10–40)
BASE EXCESS BLDA CALC-SCNC: 24.8 MMOL/L — HIGH (ref -2–3)
BASOPHILS # BLD AUTO: 0.01 K/UL — SIGNIFICANT CHANGE UP (ref 0–0.2)
BASOPHILS NFR BLD AUTO: 0.1 % — SIGNIFICANT CHANGE UP (ref 0–2)
BILIRUB SERPL-MCNC: 0.4 MG/DL — SIGNIFICANT CHANGE UP (ref 0.2–1.2)
BLOOD GAS COMMENTS ARTERIAL: SIGNIFICANT CHANGE UP
BUN SERPL-MCNC: 18 MG/DL — SIGNIFICANT CHANGE UP (ref 7–18)
CALCIUM SERPL-MCNC: 8.7 MG/DL — SIGNIFICANT CHANGE UP (ref 8.4–10.5)
CHLORIDE SERPL-SCNC: 89 MMOL/L — LOW (ref 96–108)
CO2 SERPL-SCNC: 49 MMOL/L — CRITICAL HIGH (ref 22–31)
CREAT SERPL-MCNC: 0.48 MG/DL — LOW (ref 0.5–1.3)
EGFR: 119 ML/MIN/1.73M2 — SIGNIFICANT CHANGE UP
EOSINOPHIL # BLD AUTO: 0 K/UL — SIGNIFICANT CHANGE UP (ref 0–0.5)
EOSINOPHIL NFR BLD AUTO: 0 % — SIGNIFICANT CHANGE UP (ref 0–6)
GLUCOSE SERPL-MCNC: 269 MG/DL — HIGH (ref 70–99)
HCO3 BLDA-SCNC: 56 MMOL/L — CRITICAL HIGH (ref 21–28)
HCT VFR BLD CALC: 44.7 % — SIGNIFICANT CHANGE UP (ref 39–50)
HGB BLD-MCNC: 13.2 G/DL — SIGNIFICANT CHANGE UP (ref 13–17)
HOROWITZ INDEX BLDA+IHG-RTO: 40 — SIGNIFICANT CHANGE UP
IMM GRANULOCYTES NFR BLD AUTO: 0.7 % — SIGNIFICANT CHANGE UP (ref 0–1.5)
LYMPHOCYTES # BLD AUTO: 0.6 K/UL — LOW (ref 1–3.3)
LYMPHOCYTES # BLD AUTO: 6.2 % — LOW (ref 13–44)
MAGNESIUM SERPL-MCNC: 2.1 MG/DL — SIGNIFICANT CHANGE UP (ref 1.6–2.6)
MCHC RBC-ENTMCNC: 28.1 PG — SIGNIFICANT CHANGE UP (ref 27–34)
MCHC RBC-ENTMCNC: 29.5 GM/DL — LOW (ref 32–36)
MCV RBC AUTO: 95.3 FL — SIGNIFICANT CHANGE UP (ref 80–100)
MONOCYTES # BLD AUTO: 0.14 K/UL — SIGNIFICANT CHANGE UP (ref 0–0.9)
MONOCYTES NFR BLD AUTO: 1.5 % — LOW (ref 2–14)
NEUTROPHILS # BLD AUTO: 8.82 K/UL — HIGH (ref 1.8–7.4)
NEUTROPHILS NFR BLD AUTO: 91.5 % — HIGH (ref 43–77)
NRBC # BLD: 0 /100 WBCS — SIGNIFICANT CHANGE UP (ref 0–0)
PCO2 BLDA: 92 MMHG — CRITICAL HIGH (ref 35–48)
PH BLDA: 7.39 — SIGNIFICANT CHANGE UP (ref 7.35–7.45)
PHOSPHATE SERPL-MCNC: 3.9 MG/DL — SIGNIFICANT CHANGE UP (ref 2.5–4.5)
PLATELET # BLD AUTO: 175 K/UL — SIGNIFICANT CHANGE UP (ref 150–400)
PO2 BLDA: 110 MMHG — HIGH (ref 83–108)
POTASSIUM SERPL-MCNC: 5.1 MMOL/L — SIGNIFICANT CHANGE UP (ref 3.5–5.3)
POTASSIUM SERPL-SCNC: 5.1 MMOL/L — SIGNIFICANT CHANGE UP (ref 3.5–5.3)
PROT SERPL-MCNC: 6.3 G/DL — SIGNIFICANT CHANGE UP (ref 6–8.3)
RBC # BLD: 4.69 M/UL — SIGNIFICANT CHANGE UP (ref 4.2–5.8)
RBC # FLD: 12.9 % — SIGNIFICANT CHANGE UP (ref 10.3–14.5)
SAO2 % BLDA: 98 % — SIGNIFICANT CHANGE UP
SODIUM SERPL-SCNC: 136 MMOL/L — SIGNIFICANT CHANGE UP (ref 135–145)
WBC # BLD: 9.64 K/UL — SIGNIFICANT CHANGE UP (ref 3.8–10.5)
WBC # FLD AUTO: 9.64 K/UL — SIGNIFICANT CHANGE UP (ref 3.8–10.5)

## 2022-03-19 RX ORDER — NICOTINE POLACRILEX 2 MG
1 GUM BUCCAL DAILY
Refills: 0 | Status: DISCONTINUED | OUTPATIENT
Start: 2022-03-19 | End: 2022-03-21

## 2022-03-19 RX ORDER — TRAZODONE HCL 50 MG
150 TABLET ORAL AT BEDTIME
Refills: 0 | Status: DISCONTINUED | OUTPATIENT
Start: 2022-03-20 | End: 2022-03-21

## 2022-03-19 RX ORDER — OXYCODONE AND ACETAMINOPHEN 5; 325 MG/1; MG/1
1 TABLET ORAL EVERY 6 HOURS
Refills: 0 | Status: DISCONTINUED | OUTPATIENT
Start: 2022-03-19 | End: 2022-03-21

## 2022-03-19 RX ORDER — ACETAMINOPHEN 500 MG
650 TABLET ORAL EVERY 6 HOURS
Refills: 0 | Status: DISCONTINUED | OUTPATIENT
Start: 2022-03-19 | End: 2022-03-21

## 2022-03-19 RX ORDER — ACETAZOLAMIDE 250 MG/1
250 TABLET ORAL EVERY 8 HOURS
Refills: 0 | Status: COMPLETED | OUTPATIENT
Start: 2022-03-19 | End: 2022-03-19

## 2022-03-19 RX ORDER — INSULIN GLARGINE 100 [IU]/ML
10 INJECTION, SOLUTION SUBCUTANEOUS EVERY MORNING
Refills: 0 | Status: DISCONTINUED | OUTPATIENT
Start: 2022-03-19 | End: 2022-03-20

## 2022-03-19 RX ORDER — TRAZODONE HCL 50 MG
150 TABLET ORAL ONCE
Refills: 0 | Status: COMPLETED | OUTPATIENT
Start: 2022-03-19 | End: 2022-03-19

## 2022-03-19 RX ORDER — INSULIN LISPRO 100/ML
VIAL (ML) SUBCUTANEOUS AT BEDTIME
Refills: 0 | Status: DISCONTINUED | OUTPATIENT
Start: 2022-03-19 | End: 2022-03-21

## 2022-03-19 RX ORDER — INSULIN LISPRO 100/ML
VIAL (ML) SUBCUTANEOUS
Refills: 0 | Status: DISCONTINUED | OUTPATIENT
Start: 2022-03-19 | End: 2022-03-21

## 2022-03-19 RX ADMIN — OXYCODONE AND ACETAMINOPHEN 1 TABLET(S): 5; 325 TABLET ORAL at 18:52

## 2022-03-19 RX ADMIN — CHLORHEXIDINE GLUCONATE 1 APPLICATION(S): 213 SOLUTION TOPICAL at 06:27

## 2022-03-19 RX ADMIN — Medication 0.5 MILLIGRAM(S): at 17:45

## 2022-03-19 RX ADMIN — SIMETHICONE 80 MILLIGRAM(S): 80 TABLET, CHEWABLE ORAL at 05:18

## 2022-03-19 RX ADMIN — GABAPENTIN 100 MILLIGRAM(S): 400 CAPSULE ORAL at 13:06

## 2022-03-19 RX ADMIN — GABAPENTIN 100 MILLIGRAM(S): 400 CAPSULE ORAL at 21:25

## 2022-03-19 RX ADMIN — HEPARIN SODIUM 5000 UNIT(S): 5000 INJECTION INTRAVENOUS; SUBCUTANEOUS at 05:18

## 2022-03-19 RX ADMIN — PANTOPRAZOLE SODIUM 40 MILLIGRAM(S): 20 TABLET, DELAYED RELEASE ORAL at 06:29

## 2022-03-19 RX ADMIN — BUDESONIDE AND FORMOTEROL FUMARATE DIHYDRATE 2 PUFF(S): 160; 4.5 AEROSOL RESPIRATORY (INHALATION) at 10:38

## 2022-03-19 RX ADMIN — Medication 1 PATCH: at 11:42

## 2022-03-19 RX ADMIN — Medication 2: at 21:22

## 2022-03-19 RX ADMIN — ALBUTEROL 2 PUFF(S): 90 AEROSOL, METERED ORAL at 08:07

## 2022-03-19 RX ADMIN — HEPARIN SODIUM 5000 UNIT(S): 5000 INJECTION INTRAVENOUS; SUBCUTANEOUS at 13:06

## 2022-03-19 RX ADMIN — SIMETHICONE 80 MILLIGRAM(S): 80 TABLET, CHEWABLE ORAL at 17:41

## 2022-03-19 RX ADMIN — GABAPENTIN 100 MILLIGRAM(S): 400 CAPSULE ORAL at 05:17

## 2022-03-19 RX ADMIN — Medication 40 MILLIGRAM(S): at 06:26

## 2022-03-19 RX ADMIN — Medication 40 MILLIGRAM(S): at 23:24

## 2022-03-19 RX ADMIN — Medication 5: at 17:37

## 2022-03-19 RX ADMIN — ALBUTEROL 2 PUFF(S): 90 AEROSOL, METERED ORAL at 21:24

## 2022-03-19 RX ADMIN — AZITHROMYCIN 255 MILLIGRAM(S): 500 TABLET, FILM COATED ORAL at 15:24

## 2022-03-19 RX ADMIN — LATANOPROST 1 DROP(S): 0.05 SOLUTION/ DROPS OPHTHALMIC; TOPICAL at 21:26

## 2022-03-19 RX ADMIN — Medication 1 PUFF(S): at 17:41

## 2022-03-19 RX ADMIN — Medication 0.5 MILLIGRAM(S): at 05:17

## 2022-03-19 RX ADMIN — SIMETHICONE 80 MILLIGRAM(S): 80 TABLET, CHEWABLE ORAL at 23:25

## 2022-03-19 RX ADMIN — ALBUTEROL 2 PUFF(S): 90 AEROSOL, METERED ORAL at 02:15

## 2022-03-19 RX ADMIN — ACETAZOLAMIDE 105 MILLIGRAM(S): 250 TABLET ORAL at 22:25

## 2022-03-19 RX ADMIN — OXYCODONE AND ACETAMINOPHEN 1 TABLET(S): 5; 325 TABLET ORAL at 18:16

## 2022-03-19 RX ADMIN — Medication 150 MILLIGRAM(S): at 15:34

## 2022-03-19 RX ADMIN — SENNA PLUS 2 TABLET(S): 8.6 TABLET ORAL at 21:24

## 2022-03-19 RX ADMIN — ALBUTEROL 2 PUFF(S): 90 AEROSOL, METERED ORAL at 16:02

## 2022-03-19 RX ADMIN — OXYCODONE AND ACETAMINOPHEN 1 TABLET(S): 5; 325 TABLET ORAL at 13:26

## 2022-03-19 RX ADMIN — Medication 40 MILLIGRAM(S): at 15:24

## 2022-03-19 RX ADMIN — BUDESONIDE AND FORMOTEROL FUMARATE DIHYDRATE 2 PUFF(S): 160; 4.5 AEROSOL RESPIRATORY (INHALATION) at 21:25

## 2022-03-19 RX ADMIN — Medication 1 PUFF(S): at 02:03

## 2022-03-19 RX ADMIN — Medication 1 PUFF(S): at 21:25

## 2022-03-19 RX ADMIN — SIMETHICONE 80 MILLIGRAM(S): 80 TABLET, CHEWABLE ORAL at 11:46

## 2022-03-19 RX ADMIN — Medication 1 PATCH: at 19:42

## 2022-03-19 RX ADMIN — Medication 81 MILLIGRAM(S): at 12:46

## 2022-03-19 RX ADMIN — HEPARIN SODIUM 5000 UNIT(S): 5000 INJECTION INTRAVENOUS; SUBCUTANEOUS at 21:26

## 2022-03-19 RX ADMIN — Medication 1 PUFF(S): at 08:07

## 2022-03-19 RX ADMIN — OXYCODONE AND ACETAMINOPHEN 1 TABLET(S): 5; 325 TABLET ORAL at 12:47

## 2022-03-19 RX ADMIN — ACETAZOLAMIDE 105 MILLIGRAM(S): 250 TABLET ORAL at 11:43

## 2022-03-19 NOTE — PROGRESS NOTE ADULT - SUBJECTIVE AND OBJECTIVE BOX
INTERVAL HPI/OVERNIGHT EVENTS: Patient seen and examined at bedside. No events overnight.     PRESSORS: [ ] YES [ ] NO  WHICH:    Antimicrobial:  azithromycin  IVPB 500 milliGRAM(s) IV Intermittent every 24 hours  azithromycin  IVPB        Cardiovascular:    Pulmonary:  ALBUTerol    90 MICROgram(s) HFA Inhaler 2 Puff(s) Inhalation every 6 hours  budesonide 160 MICROgram(s)/formoterol 4.5 MICROgram(s) Inhaler 2 Puff(s) Inhalation two times a day  ipratropium 17 MICROgram(s) HFA Inhaler 1 Puff(s) Inhalation every 6 hours    Hematalogic:  aspirin enteric coated 81 milliGRAM(s) Oral daily  heparin   Injectable 5000 Unit(s) SubCutaneous every 8 hours    Other:  ALPRAZolam 0.5 milliGRAM(s) Oral two times a day  chlorhexidine 2% Cloths 1 Application(s) Topical <User Schedule>  gabapentin 100 milliGRAM(s) Oral three times a day  latanoprost 0.005% Ophthalmic Solution 1 Drop(s) Both EYES at bedtime  methylPREDNISolone sodium succinate Injectable 40 milliGRAM(s) IV Push every 8 hours  pantoprazole    Tablet 40 milliGRAM(s) Oral before breakfast  senna 2 Tablet(s) Oral at bedtime  simethicone 80 milliGRAM(s) Chew four times a day    ALBUTerol    90 MICROgram(s) HFA Inhaler 2 Puff(s) Inhalation every 6 hours  ALPRAZolam 0.5 milliGRAM(s) Oral two times a day  aspirin enteric coated 81 milliGRAM(s) Oral daily  azithromycin  IVPB 500 milliGRAM(s) IV Intermittent every 24 hours  azithromycin  IVPB      budesonide 160 MICROgram(s)/formoterol 4.5 MICROgram(s) Inhaler 2 Puff(s) Inhalation two times a day  chlorhexidine 2% Cloths 1 Application(s) Topical <User Schedule>  gabapentin 100 milliGRAM(s) Oral three times a day  heparin   Injectable 5000 Unit(s) SubCutaneous every 8 hours  ipratropium 17 MICROgram(s) HFA Inhaler 1 Puff(s) Inhalation every 6 hours  latanoprost 0.005% Ophthalmic Solution 1 Drop(s) Both EYES at bedtime  methylPREDNISolone sodium succinate Injectable 40 milliGRAM(s) IV Push every 8 hours  pantoprazole    Tablet 40 milliGRAM(s) Oral before breakfast  senna 2 Tablet(s) Oral at bedtime  simethicone 80 milliGRAM(s) Chew four times a day    Drug Dosing Weight  Height (cm): 172.7 (18 Mar 2022 10:31)  Weight (kg): 90.7 (18 Mar 2022 10:31)  BMI (kg/m2): 30.4 (18 Mar 2022 10:31)  BSA (m2): 2.04 (18 Mar 2022 10:31)    CENTRAL LINE: [ ] YES [ ] NO  LOCATION:   DATE INSERTED:  REMOVE: [ ] YES [ ] NO  EXPLAIN:    AMOR: [ ] YES [ ] NO    DATE INSERTED:  REMOVE:  [ ] YES [ ] NO  EXPLAIN:    A-LINE:  [ ] YES [ ] NO  LOCATION:   DATE INSERTED:  REMOVE:  [ ] YES [ ] NO  EXPLAIN:    PMH -reviewed admission note, no change since admission  PAST MEDICAL & SURGICAL HISTORY:  COPD (chronic obstructive pulmonary disease)  Oxygen 2-3L at home    Stented coronary artery  2017    HLD (hyperlipidemia)    Pulmonary HTN    Type 2 diabetes mellitus without complication, with long-term current use of insulin    Coronary artery disease involving native coronary artery of native heart without angina pectoris    Smoker    Gastroesophageal reflux disease, esophagitis presence not specified    Oxygen dependent    DM (diabetes mellitus)    CAD (coronary artery disease)    GERD (gastroesophageal reflux disease)    Insomnia    CHF (congestive heart failure)    HTN (hypertension)    Mood disorder    No significant past surgical history        ICU Vital Signs Last 24 Hrs  T(C): 35.6 (19 Mar 2022 00:12), Max: 37.6 (18 Mar 2022 10:31)  T(F): 96 (19 Mar 2022 00:12), Max: 99.6 (18 Mar 2022 10:31)  HR: 82 (19 Mar 2022 01:00) (82 - 127)  BP: 150/76 (19 Mar 2022 01:00) (112/65 - 150/76)  BP(mean): 92 (19 Mar 2022 01:00) (74 - 94)  ABP: --  ABP(mean): --  RR: 30 (19 Mar 2022 01:00) (17 - 33)  SpO2: 92% (19 Mar 2022 01:00) (92% - 98%)      ABG - ( 18 Mar 2022 21:11 )  pH, Arterial: 7.38  pH, Blood: x     /  pCO2: 94    /  pO2: 84    / HCO3: 56    / Base Excess: 24.5  /  SaO2: 98          PHYSICAL EXAM:    GENERAL:  calm on bipap  HEAD:  Atraumatic, Normocephalic  EYES: EOMI, PERRLA, conjunctiva and sclera clear  NECK: Supple, No JVD  CHEST/LUNG: Clear to auscultation bilaterally; No wheeze; No crackles; accessory muscle use noted  HEART: Regular rate and rhythm; No murmurs;   ABDOMEN: Soft, Nontender, Nondistended; Bowel sounds present; No guarding  EXTREMITIES:  2+ Peripheral Pulses, No cyanosis or edema  PSYCH:  Normal Affect  NEUROLOGY: non-focal, AAO X 3. Strength is 5/5. no sensory loss.  SKIN: No rashes or lesions    LABS:  CBC Full  -  ( 18 Mar 2022 11:07 )  WBC Count : 14.23 K/uL  RBC Count : 4.59 M/uL  Hemoglobin : 13.1 g/dL  Hematocrit : 44.6 %  Platelet Count - Automated : 185 K/uL  Mean Cell Volume : 97.2 fl  Mean Cell Hemoglobin : 28.5 pg  Mean Cell Hemoglobin Concentration : 29.4 gm/dL  Auto Neutrophil # : 12.10 K/uL  Auto Lymphocyte # : 1.06 K/uL  Auto Monocyte # : 0.85 K/uL  Auto Eosinophil # : 0.08 K/uL  Auto Basophil # : 0.03 K/uL  Auto Neutrophil % : 85.0 %  Auto Lymphocyte % : 7.4 %  Auto Monocyte % : 6.0 %  Auto Eosinophil % : 0.6 %  Auto Basophil % : 0.2 %    03-18    138  |  90<L>  |  18  ----------------------------<  156<H>  4.8   |  >45<HH>  |  0.58    Ca    8.8      18 Mar 2022 11:07    TPro  6.8  /  Alb  3.2<L>  /  TBili  0.4  /  DBili  x   /  AST  7<L>  /  ALT  59  /  AlkPhos  58  03-18            RADIOLOGY & ADDITIONAL STUDIES REVIEWED:  ***    [ ]GOALS OF CARE DISCUSSION WITH PATIENT/FAMILY/PROXY:    CRITICAL CARE TIME SPENT: 35 minutes INTERVAL HPI/OVERNIGHT EVENTS: Patient seen and examined at bedside. No events overnight.     PRESSORS:  [X ] NO      Antimicrobial:  azithromycin  IVPB 500 milliGRAM(s) IV Intermittent every 24 hours  azithromycin  IVPB        Cardiovascular:    Pulmonary:  ALBUTerol    90 MICROgram(s) HFA Inhaler 2 Puff(s) Inhalation every 6 hours  budesonide 160 MICROgram(s)/formoterol 4.5 MICROgram(s) Inhaler 2 Puff(s) Inhalation two times a day  ipratropium 17 MICROgram(s) HFA Inhaler 1 Puff(s) Inhalation every 6 hours    Hematalogic:  aspirin enteric coated 81 milliGRAM(s) Oral daily  heparin   Injectable 5000 Unit(s) SubCutaneous every 8 hours    Other:  ALPRAZolam 0.5 milliGRAM(s) Oral two times a day  chlorhexidine 2% Cloths 1 Application(s) Topical <User Schedule>  gabapentin 100 milliGRAM(s) Oral three times a day  latanoprost 0.005% Ophthalmic Solution 1 Drop(s) Both EYES at bedtime  methylPREDNISolone sodium succinate Injectable 40 milliGRAM(s) IV Push every 8 hours  pantoprazole    Tablet 40 milliGRAM(s) Oral before breakfast  senna 2 Tablet(s) Oral at bedtime  simethicone 80 milliGRAM(s) Chew four times a day    ALBUTerol    90 MICROgram(s) HFA Inhaler 2 Puff(s) Inhalation every 6 hours  ALPRAZolam 0.5 milliGRAM(s) Oral two times a day  aspirin enteric coated 81 milliGRAM(s) Oral daily  azithromycin  IVPB 500 milliGRAM(s) IV Intermittent every 24 hours  azithromycin  IVPB      budesonide 160 MICROgram(s)/formoterol 4.5 MICROgram(s) Inhaler 2 Puff(s) Inhalation two times a day  chlorhexidine 2% Cloths 1 Application(s) Topical <User Schedule>  gabapentin 100 milliGRAM(s) Oral three times a day  heparin   Injectable 5000 Unit(s) SubCutaneous every 8 hours  ipratropium 17 MICROgram(s) HFA Inhaler 1 Puff(s) Inhalation every 6 hours  latanoprost 0.005% Ophthalmic Solution 1 Drop(s) Both EYES at bedtime  methylPREDNISolone sodium succinate Injectable 40 milliGRAM(s) IV Push every 8 hours  pantoprazole    Tablet 40 milliGRAM(s) Oral before breakfast  senna 2 Tablet(s) Oral at bedtime  simethicone 80 milliGRAM(s) Chew four times a day    Drug Dosing Weight  Height (cm): 172.7 (18 Mar 2022 10:31)  Weight (kg): 90.7 (18 Mar 2022 10:31)  BMI (kg/m2): 30.4 (18 Mar 2022 10:31)  BSA (m2): 2.04 (18 Mar 2022 10:31)    CENTRAL LINE:  [X ] NO      AMOR:  [X ] NO       A-LINE:   [X ] NO     PMH -reviewed admission note, no change since admission  PAST MEDICAL & SURGICAL HISTORY:  COPD (chronic obstructive pulmonary disease)  Oxygen 2-3L at home    Stented coronary artery  2017    HLD (hyperlipidemia)    Pulmonary HTN    Type 2 diabetes mellitus without complication, with long-term current use of insulin    Coronary artery disease involving native coronary artery of native heart without angina pectoris    Smoker    Gastroesophageal reflux disease, esophagitis presence not specified    Oxygen dependent    DM (diabetes mellitus)    CAD (coronary artery disease)    GERD (gastroesophageal reflux disease)    Insomnia    CHF (congestive heart failure)    HTN (hypertension)    Mood disorder    No significant past surgical history        ICU Vital Signs Last 24 Hrs  T(C): 35.6 (19 Mar 2022 00:12), Max: 37.6 (18 Mar 2022 10:31)  T(F): 96 (19 Mar 2022 00:12), Max: 99.6 (18 Mar 2022 10:31)  HR: 82 (19 Mar 2022 01:00) (82 - 127)  BP: 150/76 (19 Mar 2022 01:00) (112/65 - 150/76)  BP(mean): 92 (19 Mar 2022 01:00) (74 - 94)  ABP: --  ABP(mean): --  RR: 30 (19 Mar 2022 01:00) (17 - 33)  SpO2: 92% (19 Mar 2022 01:00) (92% - 98%)      ABG - ( 18 Mar 2022 21:11 )  pH, Arterial: 7.38  pH, Blood: x     /  pCO2: 94    /  pO2: 84    / HCO3: 56    / Base Excess: 24.5  /  SaO2: 98          PHYSICAL EXAM:    GENERAL:  calm on bipap  HEAD:  Atraumatic, Normocephalic  EYES: EOMI, PERRLA, conjunctiva and sclera clear  NECK: Supple, No JVD  CHEST/LUNG: Clear to auscultation bilaterally; No wheeze; No crackles; accessory muscle use noted  HEART: Regular rate and rhythm; No murmurs;   ABDOMEN: Soft, Nontender, Nondistended; Bowel sounds present; No guarding  EXTREMITIES:  2+ Peripheral Pulses, No cyanosis or edema  PSYCH:  Normal Affect  NEUROLOGY: non-focal, AAO X 3. Strength is 5/5. no sensory loss.  SKIN: No rashes or lesions    LABS:  CBC Full  -  ( 18 Mar 2022 11:07 )  WBC Count : 14.23 K/uL  RBC Count : 4.59 M/uL  Hemoglobin : 13.1 g/dL  Hematocrit : 44.6 %  Platelet Count - Automated : 185 K/uL  Mean Cell Volume : 97.2 fl  Mean Cell Hemoglobin : 28.5 pg  Mean Cell Hemoglobin Concentration : 29.4 gm/dL  Auto Neutrophil # : 12.10 K/uL  Auto Lymphocyte # : 1.06 K/uL  Auto Monocyte # : 0.85 K/uL  Auto Eosinophil # : 0.08 K/uL  Auto Basophil # : 0.03 K/uL  Auto Neutrophil % : 85.0 %  Auto Lymphocyte % : 7.4 %  Auto Monocyte % : 6.0 %  Auto Eosinophil % : 0.6 %  Auto Basophil % : 0.2 %    03-18    138  |  90<L>  |  18  ----------------------------<  156<H>  4.8   |  >45<HH>  |  0.58    Ca    8.8      18 Mar 2022 11:07    TPro  6.8  /  Alb  3.2<L>  /  TBili  0.4  /  DBili  x   /  AST  7<L>  /  ALT  59  /  AlkPhos  58  03-18            RADIOLOGY & ADDITIONAL STUDIES REVIEWED:     [ X]GOALS OF CARE DISCUSSION WITH PATIENT/FAMILY/PROXY:    CRITICAL CARE TIME SPENT: 35 minutes

## 2022-03-19 NOTE — CHART NOTE - NSCHARTNOTEFT_GEN_A_CORE
- Pt transferred From ICU to  in 4L NC, Mild distress . BIpap PRN, however pt non adherent, Pt wants to go to back to  [facility].  Will discuss with attending.     Hospital Course:  Mr. Sumner is a 61 year old man from [facility], active smoker, with a PMHx of CAD, CHF on nocturnal BIPAP, COPD (intubation in past), peripheral neuropathy, GERD, HTN, HLD, obesity, polyarthritis, Pulmonary HTN, TIA, Vit D Def, and recent admission to [facility] for respiratory failure requiring intubation (3/9 - 15), who re-presented to [facility] on 3/18 for acute on chronic respiratory failure with hypoxia and hypercapnia. Patient reports that while after this past discharge he began smoking again and was not adherent with his BiPAP, prompting the re-admission. Admitted from ED to ICU for new BiPAP and close monitoring due to concern for re-intubation. In ICU was found to have hypercapnia and elevated serum bicarb, mild improvement after resuming nocturnal BiPAP. Re-started on Solumedrol 40q8, continued on Symbicort, Spiriva, and albuterol.   Currently, patient is awake, alert, oriented, anxious/agitated, refusing to use BiPAP, SPO2 greater than 88% while on NC at rest. After discussing potential risks for worsening hypercapnia, also continued on his home Xanax and Oxycodone, added on Trazadone and Nicotine patch to assist with anxiety.   Short course of Diamox to assist with diuresis and lower serum bicarb, hopefully to lead to less CO2 retention.     - Continue steroid course.   - Nocturnal and BiPAP PRN.   - Continue patient education on smoking cessation and adherence to BiPAP.   - Follow up repeat BMP and ABG in am after Diamox 250 x2 doses.

## 2022-03-19 NOTE — PROGRESS NOTE ADULT - ASSESSMENT
Patient is a 61 year old male, from [place], active smoker PMHx of CAD, ?CHF, COPD, peripheral neuropathy, GERD, HTN, HLD, obesity, polyarthritis, Pulmonary HTN, came with chief complain of shortness of breath. ICU was consulted for increased worsening hypoxia and increase work of breathing    Assessment:  - Acute hypoxic hypercapnic respiratory failure   - COPD exacerbation    - Respiratory acidosis   - HTN  - HLD  - CAD   - DM type 2  - GERD  - Pulmonary HTN             Plan:  Neuro:  - no acute issues   -Continue xanax home med      Cardiovascular:  #CAD  Patient with hx of CAD s/p stent in 2017 as per chart  Will continue with aspirin and statin      #Pulmonary:    Acute hypoxic hypercapnic respiratory failure likely due to COPD exacerbation    Patient presented due to SOB    patient noted to be tachypneic in ED and placed on BiPAP   continue with Solumedorl 40 IV.   CXR showed b/l vasilar    monitor O2 sat    Albuterol and Symbicort.      #Respiratory acidosis   - noted to have CO2 more 125 on ABG   - patient appears to be chronic retainer normally with CO2 in 40s   - placed on BiPAP with improvement in pH  - continue with BiPAP for now  - repeat ABG in AM     #Infectious Diseases:  - Patient noted with leukocytosis  will continue with Zithromax for COPD    #Gastrointestinal:  - will keep NPO for now while on BiPAP    #Renal:  no acute issues  monitor electrolytes    #Heme/onc:   - no acute issues     Endo:   #DM  - A1c 7.4  will start the patient on basal bolus.  adjust insulin as indicated. goal -180.      Skin/ catheter:   - no acute issues     Prophylaxis:   - lovenox for DVT prophylaxis    Goals of Care:   - Full Code     Dispo: Transfer to ICU  Patient is a 61 year old male, from [place], active smoker PMHx of CAD, ?CHF, COPD, peripheral neuropathy, GERD, HTN, HLD, obesity, polyarthritis, Pulmonary HTN, came with chief complain of shortness of breath. ICU was consulted for increased worsening hypoxia and increase work of breathing    Assessment:  - Acute hypoxic hypercapnic respiratory failure   - COPD exacerbation    - Respiratory acidosis   - HTN  - HLD  - CAD   - DM type 2  - GERD  - Pulmonary HTN             Plan:  Neuro:  - no acute issues   -Continue xanax home med  -restart trazodone  -takes percocet for chronic back pain- to c/w perco      Cardiovascular:  #CAD  Patient with hx of CAD s/p stent in 2017 as per chart  Will continue with aspirin and statin      #Pulmonary:    Acute hypoxic hypercapnic respiratory failure likely due to COPD exacerbation    Patient presented due to SOB    patient noted to be tachypneic in ED and placed on BiPAP   continue with Solumedrol 40 IV.   CXR showed b/l basilar    monitor O2 sat    Albuterol and Symbicort.      #Respiratory acidosis   - noted to have CO2 more 125 on ABG   - patient appears to be chronic retainer normally with CO2 in 70-80s  - placed on BiPAP with improvement in pH, maybe overcompensated  -will give some diamox 125mg x2 doses   - continue with BiPAP for now at night  NC as tolerated during the day        #Infectious Diseases:  - Patient noted with leukocytosis  will continue with Zithromax for COPD    #Gastrointestinal:  - will keep NPO for now while on BiPAP  -Advance diet otherwise      #Renal:  no acute issues  monitor electrolytes    #Heme/onc:   - no acute issues     Endo:   #DM  - A1c 7.4  will start the patient on basal bolus.  started lantus 10 and ISS  adjust insulin as indicated. goal -180.      Skin/ catheter:   - no acute issues     Prophylaxis:   - lovenox for DVT prophylaxis    Goals of Care:   - Full Code     Dispo: Transfer to ICU

## 2022-03-19 NOTE — CONSULT NOTE ADULT - SUBJECTIVE AND OBJECTIVE BOX
HPI:   61 year old Male, from assisted living  and medical history of CAD, CHF on BIPAP at NH, COPD (intubated ,later extubated a week ago), peripheral neuropathy, GERD, HTN, HLD, obesity, polyarthritis, Pulmonary HTN,  was brought in by EMS for shortness of breath. Patient was discharged from hospital, he states he started smoking again at his assisted living, this morning he has so much work of breathing so was sent to ER for further assessment. Patient denied having any chest pain, palpitations fever, nausea vomiting diarrhea or any other complains.   (18 Mar 2022 15:21)      PAST MEDICAL & SURGICAL HISTORY:  COPD (chronic obstructive pulmonary disease)  Oxygen 2-3L at home    Stented coronary artery  2017    HLD (hyperlipidemia)    Pulmonary HTN    Type 2 diabetes mellitus without complication, with long-term current use of insulin    Coronary artery disease involving native coronary artery of native heart without angina pectoris    Smoker    Gastroesophageal reflux disease, esophagitis presence not specified    Oxygen dependent    DM (diabetes mellitus)    CAD (coronary artery disease)    GERD (gastroesophageal reflux disease)    Insomnia    CHF (congestive heart failure)    HTN (hypertension)    Mood disorder    No significant past surgical history        No Known Allergies      Social Hx:    FAMILY HISTORY:  No family history of chronic obstructive pulmonary disease    No family history of hypertension    No family history of mental disorder    FH: myocardial infarction (Mother)        acetaminophen     Tablet .. 650 milliGRAM(s) Oral every 6 hours PRN  ALBUTerol    90 MICROgram(s) HFA Inhaler 2 Puff(s) Inhalation every 6 hours  ALPRAZolam 0.5 milliGRAM(s) Oral two times a day  aspirin enteric coated 81 milliGRAM(s) Oral daily  azithromycin  IVPB 500 milliGRAM(s) IV Intermittent every 24 hours  azithromycin  IVPB      budesonide 160 MICROgram(s)/formoterol 4.5 MICROgram(s) Inhaler 2 Puff(s) Inhalation two times a day  gabapentin 100 milliGRAM(s) Oral three times a day  heparin   Injectable 5000 Unit(s) SubCutaneous every 8 hours  insulin glargine Injectable (LANTUS) 10 Unit(s) SubCutaneous every morning  insulin lispro (ADMELOG) corrective regimen sliding scale   SubCutaneous three times a day before meals  insulin lispro (ADMELOG) corrective regimen sliding scale   SubCutaneous at bedtime  ipratropium 17 MICROgram(s) HFA Inhaler 1 Puff(s) Inhalation every 6 hours  latanoprost 0.005% Ophthalmic Solution 1 Drop(s) Both EYES at bedtime  methylPREDNISolone sodium succinate Injectable 40 milliGRAM(s) IV Push every 8 hours  nicotine -  14 mG/24Hr(s) Patch 1 patch Transdermal daily  oxycodone    5 mG/acetaminophen 325 mG 1 Tablet(s) Oral every 6 hours PRN  pantoprazole    Tablet 40 milliGRAM(s) Oral before breakfast  senna 2 Tablet(s) Oral at bedtime  simethicone 80 milliGRAM(s) Chew four times a day      MEDICATIONS  (PRN):  acetaminophen     Tablet .. 650 milliGRAM(s) Oral every 6 hours PRN Mild Pain (1 - 3), Moderate Pain (4 - 6)  oxycodone    5 mG/acetaminophen 325 mG 1 Tablet(s) Oral every 6 hours PRN Severe Pain (7 - 10)      T(C): 36.3 (03-19-22 @ 20:16), Max: 36.7 (03-19-22 @ 14:35)  HR: 102 (03-19-22 @ 20:16) (82 - 107)  BP: 108/84 (03-19-22 @ 20:16) (108/84 - 150/76)  RR: 18 (03-19-22 @ 20:16) (18 - 36)  SpO2: 94% (03-19-22 @ 20:16) (83% - 97%)        REVIEW OF SYSTEMS:                           ALL ROS DONE [ X   ]    CONSTITUTIONAL:  LETHARGIC [   ], FEVER [   ], UNRESPONSIVE [   ]  CVS:  CP  [   ], SOB, [   ], PALPITATIONS [   ], DIZZYNESS [   ]  RS: COUGH [   ], SPUTUM [   ]  GI: ABDOMINAL PAIN [   ], NAUSEA [   ], VOMITINGS [   ], DIARRHEA [   ], CONSTIPATION [   ]  :  DYSURIA [   ], NOCTURIA [   ], INCREASED FREQUENCY [   ], DRIBLING [   ],  SKELETAL: PAINFUL JOINTS [   ], SWOLLEN JOINTS [   ], NECK ACHE [   ], LOW BACK ACHE [   ],  SKIN : ULCERS [   ], RASH [   ], ITCHING [   ]  CNS: HEAD ACHE [   ], DOUBLE VISION [   ], BLURRED VISION [   ], AMS / CONFUSION [   ], SEIZURES [   ], WEAKNESS [   ],TINGLING / NUMBNESS [   ]    PHYSICAL EXAMINATION:  GENERAL APPEARANCE: NO DISTRESS  HEENT:  NO PALLOR, NO  JVD,  NO   NODES, NECK SUPPLE  CVS: S1 +, S2 +,   RS: AEEB,  OCCASIONAL  RALES +,   NO RONCHI  ABD: SOFT, NT, NO, BS +  EXT: NO PE  SKIN: WARM,   SKELETAL:  ROM ACCEPTABLE  CNS:  AAO X    ,   DEFICITS    RADIOLOGY :      ASSESSMENT :       PLAN:  HPI:   61 year old Male, from assisted living  and medical history of CAD, CHF on BIPAP at NH, COPD (intubated ,later extubated a week ago), peripheral neuropathy, GERD, HTN, HLD, obesity, polyarthritis, Pulmonary HTN,  was brought in by EMS for shortness of breath. Patient was discharged from hospital, he states he started smoking again at his assisted living, this morning he has so much work of breathing so was sent to ER for further assessment. Patient denied having any chest pain, palpitations fever, nausea vomiting diarrhea or any other complains.   (18 Mar 2022 15:21)    -      HPI:   61 year old Male, from assisted living  and medical history of CAD, CHF on BIPAP at NH, COPD (intubated ,later extubated a week ago), peripheral neuropathy, GERD, HTN, HLD, obesity, polyarthritis, Pulmonary HTN,  was brought in by EMS for shortness of breath. Patient was discharged from hospital, he states he started smoking again at his assisted living, this morning he has so much work of breathing so was sent to ER for further assessment. Patient denied having any chest pain, palpitations fever, nausea vomiting diarrhea or any other complains.   (18 Mar 2022 15:21)      PAST MEDICAL & SURGICAL HISTORY:  COPD (chronic obstructive pulmonary disease)  Oxygen 2-3L at home    Stented coronary artery  2017    HLD (hyperlipidemia)    Pulmonary HTN    Type 2 diabetes mellitus without complication, with long-term current use of insulin    Coronary artery disease involving native coronary artery of native heart without angina pectoris    Smoker    Gastroesophageal reflux disease, esophagitis presence not specified    Oxygen dependent    DM (diabetes mellitus)    CAD (coronary artery disease)    GERD (gastroesophageal reflux disease)    Insomnia    CHF (congestive heart failure)    HTN (hypertension)    Mood disorder    No significant past surgical history        No Known Allergies      Social Hx:    FAMILY HISTORY:  No family history of chronic obstructive pulmonary disease    No family history of hypertension    No family history of mental disorder    FH: myocardial infarction (Mother)        acetaminophen     Tablet .. 650 milliGRAM(s) Oral every 6 hours PRN  ALBUTerol    90 MICROgram(s) HFA Inhaler 2 Puff(s) Inhalation every 6 hours  ALPRAZolam 0.5 milliGRAM(s) Oral two times a day  aspirin enteric coated 81 milliGRAM(s) Oral daily  azithromycin  IVPB 500 milliGRAM(s) IV Intermittent every 24 hours  azithromycin  IVPB      budesonide 160 MICROgram(s)/formoterol 4.5 MICROgram(s) Inhaler 2 Puff(s) Inhalation two times a day  gabapentin 100 milliGRAM(s) Oral three times a day  heparin   Injectable 5000 Unit(s) SubCutaneous every 8 hours  insulin glargine Injectable (LANTUS) 10 Unit(s) SubCutaneous every morning  insulin lispro (ADMELOG) corrective regimen sliding scale   SubCutaneous three times a day before meals  insulin lispro (ADMELOG) corrective regimen sliding scale   SubCutaneous at bedtime  ipratropium 17 MICROgram(s) HFA Inhaler 1 Puff(s) Inhalation every 6 hours  latanoprost 0.005% Ophthalmic Solution 1 Drop(s) Both EYES at bedtime  methylPREDNISolone sodium succinate Injectable 40 milliGRAM(s) IV Push every 8 hours  nicotine -  14 mG/24Hr(s) Patch 1 patch Transdermal daily  oxycodone    5 mG/acetaminophen 325 mG 1 Tablet(s) Oral every 6 hours PRN  pantoprazole    Tablet 40 milliGRAM(s) Oral before breakfast  senna 2 Tablet(s) Oral at bedtime  simethicone 80 milliGRAM(s) Chew four times a day      MEDICATIONS  (PRN):  acetaminophen     Tablet .. 650 milliGRAM(s) Oral every 6 hours PRN Mild Pain (1 - 3), Moderate Pain (4 - 6)  oxycodone    5 mG/acetaminophen 325 mG 1 Tablet(s) Oral every 6 hours PRN Severe Pain (7 - 10)      T(C): 36.3 (03-19-22 @ 20:16), Max: 36.7 (03-19-22 @ 14:35)  HR: 102 (03-19-22 @ 20:16) (82 - 107)  BP: 108/84 (03-19-22 @ 20:16) (108/84 - 150/76)  RR: 18 (03-19-22 @ 20:16) (18 - 36)  SpO2: 94% (03-19-22 @ 20:16) (83% - 97%)        REVIEW OF SYSTEMS:                           ALL ROS DONE [ X   ]    CONSTITUTIONAL:  LETHARGIC [   ], FEVER [   ], UNRESPONSIVE [   ]  CVS:  CP  [   ], SOB, [   ], PALPITATIONS [   ], DIZZYNESS [   ]  RS: COUGH [   ], SPUTUM [   ]  GI: ABDOMINAL PAIN [   ], NAUSEA [   ], VOMITINGS [   ], DIARRHEA [   ], CONSTIPATION [   ]  :  DYSURIA [   ], NOCTURIA [   ], INCREASED FREQUENCY [   ], DRIBLING [   ],  SKELETAL: PAINFUL JOINTS [   ], SWOLLEN JOINTS [   ], NECK ACHE [   ], LOW BACK ACHE [   ],  SKIN : ULCERS [   ], RASH [   ], ITCHING [   ]  CNS: HEAD ACHE [   ], DOUBLE VISION [   ], BLURRED VISION [   ], AMS / CONFUSION [   ], SEIZURES [   ], WEAKNESS [   ],TINGLING / NUMBNESS [   ]    PHYSICAL EXAMINATION:  GENERAL APPEARANCE: NO DISTRESS  HEENT:  NO PALLOR, NO  JVD,  NO   NODES, NECK SUPPLE  CVS: S1 +, S2 +,   RS: AEEB,  OCCASIONAL  RALES +,   WHEEZING +  ABD: SOFT, NT, NO, BS +  EXT: NO PE  SKIN: WARM,   SKELETAL:  ROM ACCEPTABLE  CNS:  AAO X 3    RADIOLOGY :    ACC: [id number] EXAM:  XR CHEST PORTABLE URGENT 1V                          PROCEDURE DATE:  03/18/2022          INTERPRETATION:  AP chest on March 18, 2022 at 12:19 PM. Patient is   hypoxic.    Heart magnified by technique.    Prominent pulmonary arteries suggest pulmonary hypertension.    Engorged vessels in the lower lung field suggesting CHF. On March 11   there was also an NG tube no longer evident.    Chest is otherwise similar to March 11.    IMPRESSION: Prominent pulmonary arteries and lower lung field CHF again   noted.      ASSESSMENT :       PLAN:  HPI:   61 year old Male, from assisted living  and medical history of CAD, CHF on BIPAP at NH, COPD (intubated ,later extubated a week ago), peripheral neuropathy, GERD, HTN, HLD, obesity, polyarthritis, Pulmonary HTN,  was brought in by EMS for shortness of breath. Patient was discharged from hospital, he states he started smoking again at his assisted living, this morning he has so much work of breathing so was sent to ER for further assessment. Patient denied having any chest pain, palpitations fever, nausea vomiting diarrhea or any other complains.   (18 Mar 2022 15:21)      - COPD EXACERBATION, ACUTE ON CHRONIC HYPOXIC & HYPERCAPNOEIC RESPIRATORY FAILURE S/T NONCOMPLIANCE W/ TOBACCO CESSATION AND W/ BIPAP   - STARTED ON IV STEROIDS [TAPERING], SYMBICORT, SPIRIVA, NEBS,  BiPAP, O2 SUPPLEMENTS  - S/P DIAMOX  - NICOTINE PATCH  - S/P CRITICAL CARE EVALUATION AND ICU ADMISSION    - UNCONTROLLED DM, GLUCOSE INTOLERANCE DUE TO STEROIDS  -  LANTUS, SSI + FS     - SMOKER, COUNSELLED TO QUIT SMOKING     - GI AND DVT PROPHYLAXIS   HPI:   61 year old Male, from assisted living  and medical history of CAD, CHF on BIPAP at NH, COPD (intubated ,later extubated a week ago), peripheral neuropathy, GERD, HTN, HLD, obesity, polyarthritis, Pulmonary HTN,  was brought in by EMS for shortness of breath. Patient was discharged from hospital, he states he started smoking again at his assisted living, this morning he has so much work of breathing so was sent to ER for further assessment. Patient denied having any chest pain, palpitations fever, nausea vomiting diarrhea or any other complains.   (18 Mar 2022 15:21)      PAST MEDICAL & SURGICAL HISTORY:  COPD (chronic obstructive pulmonary disease)  Oxygen 2-3L at home    Stented coronary artery  2017    HLD (hyperlipidemia)    Pulmonary HTN    Type 2 diabetes mellitus without complication, with long-term current use of insulin    Coronary artery disease involving native coronary artery of native heart without angina pectoris    Smoker    Gastroesophageal reflux disease, esophagitis presence not specified    Oxygen dependent    DM (diabetes mellitus)    CAD (coronary artery disease)    GERD (gastroesophageal reflux disease)    Insomnia    CHF (congestive heart failure)    HTN (hypertension)    Mood disorder    No significant past surgical history        No Known Allergies      Social Hx:    FAMILY HISTORY:  No family history of chronic obstructive pulmonary disease    No family history of hypertension    No family history of mental disorder    FH: myocardial infarction (Mother)        acetaminophen     Tablet .. 650 milliGRAM(s) Oral every 6 hours PRN  ALBUTerol    90 MICROgram(s) HFA Inhaler 2 Puff(s) Inhalation every 6 hours  ALPRAZolam 0.5 milliGRAM(s) Oral two times a day  aspirin enteric coated 81 milliGRAM(s) Oral daily  azithromycin  IVPB 500 milliGRAM(s) IV Intermittent every 24 hours  azithromycin  IVPB      budesonide 160 MICROgram(s)/formoterol 4.5 MICROgram(s) Inhaler 2 Puff(s) Inhalation two times a day  gabapentin 100 milliGRAM(s) Oral three times a day  heparin   Injectable 5000 Unit(s) SubCutaneous every 8 hours  insulin glargine Injectable (LANTUS) 10 Unit(s) SubCutaneous every morning  insulin lispro (ADMELOG) corrective regimen sliding scale   SubCutaneous three times a day before meals  insulin lispro (ADMELOG) corrective regimen sliding scale   SubCutaneous at bedtime  ipratropium 17 MICROgram(s) HFA Inhaler 1 Puff(s) Inhalation every 6 hours  latanoprost 0.005% Ophthalmic Solution 1 Drop(s) Both EYES at bedtime  methylPREDNISolone sodium succinate Injectable 40 milliGRAM(s) IV Push every 8 hours  nicotine -  14 mG/24Hr(s) Patch 1 patch Transdermal daily  oxycodone    5 mG/acetaminophen 325 mG 1 Tablet(s) Oral every 6 hours PRN  pantoprazole    Tablet 40 milliGRAM(s) Oral before breakfast  senna 2 Tablet(s) Oral at bedtime  simethicone 80 milliGRAM(s) Chew four times a day      MEDICATIONS  (PRN):  acetaminophen     Tablet .. 650 milliGRAM(s) Oral every 6 hours PRN Mild Pain (1 - 3), Moderate Pain (4 - 6)  oxycodone    5 mG/acetaminophen 325 mG 1 Tablet(s) Oral every 6 hours PRN Severe Pain (7 - 10)      T(C): 36.3 (03-19-22 @ 20:16), Max: 36.7 (03-19-22 @ 14:35)  HR: 102 (03-19-22 @ 20:16) (82 - 107)  BP: 108/84 (03-19-22 @ 20:16) (108/84 - 150/76)  RR: 18 (03-19-22 @ 20:16) (18 - 36)  SpO2: 94% (03-19-22 @ 20:16) (83% - 97%)        REVIEW OF SYSTEMS:                           ALL ROS DONE [ X   ]    CONSTITUTIONAL:  LETHARGIC [   ], FEVER [   ], UNRESPONSIVE [   ]  CVS:  CP  [   ], SOB, [   ], PALPITATIONS [   ], DIZZYNESS [   ]  RS: COUGH [   ], SPUTUM [   ]  GI: ABDOMINAL PAIN [   ], NAUSEA [   ], VOMITINGS [   ], DIARRHEA [   ], CONSTIPATION [   ]  :  DYSURIA [   ], NOCTURIA [   ], INCREASED FREQUENCY [   ], DRIBLING [   ],  SKELETAL: PAINFUL JOINTS [   ], SWOLLEN JOINTS [   ], NECK ACHE [   ], LOW BACK ACHE [   ],  SKIN : ULCERS [   ], RASH [   ], ITCHING [   ]  CNS: HEAD ACHE [   ], DOUBLE VISION [   ], BLURRED VISION [   ], AMS / CONFUSION [   ], SEIZURES [   ], WEAKNESS [   ],TINGLING / NUMBNESS [   ]    PHYSICAL EXAMINATION:  GENERAL APPEARANCE: NO DISTRESS  HEENT:  NO PALLOR, NO  JVD,  NO   NODES, NECK SUPPLE  CVS: S1 +, S2 +,   RS: AEEB,  OCCASIONAL  RALES +,   WHEEZING +  ABD: SOFT, NT, NO, BS +  EXT: NO PE  SKIN: WARM,   SKELETAL:  ROM ACCEPTABLE  CNS:  AAO X 3    RADIOLOGY :    ACC: [id number] EXAM:  XR CHEST PORTABLE URGENT 1V                          PROCEDURE DATE:  03/18/2022          INTERPRETATION:  AP chest on March 18, 2022 at 12:19 PM. Patient is   hypoxic.    Heart magnified by technique.    Prominent pulmonary arteries suggest pulmonary hypertension.    Engorged vessels in the lower lung field suggesting CHF. On March 11   there was also an NG tube no longer evident.    Chest is otherwise similar to March 11.    IMPRESSION: Prominent pulmonary arteries and lower lung field CHF again   noted.      ASSESSMENT :       PLAN:  HPI:   61 year old Male, from assisted living  and medical history of CAD, CHF on BIPAP at NH, COPD (intubated ,later extubated a week ago), peripheral neuropathy, GERD, HTN, HLD, obesity, polyarthritis, Pulmonary HTN,  was brought in by EMS for shortness of breath. Patient was discharged from hospital, he states he started smoking again at his assisted living, this morning he has so much work of breathing so was sent to ER for further assessment. Patient denied having any chest pain, palpitations fever, nausea vomiting diarrhea or any other complains.   (18 Mar 2022 15:21)      - COPD EXACERBATION, ACUTE ON CHRONIC HYPOXIC & HYPERCAPNOEIC RESPIRATORY FAILURE S/T NONCOMPLIANCE W/ TOBACCO CESSATION AND W/ BIPAP   - STARTED ON IV STEROIDS [TAPERING], SYMBICORT, SPIRIVA, AZITHROMYCIN, NEBS,  BiPAP, O2 SUPPLEMENTS  - S/P DIAMOX  - NICOTINE PATCH  - S/P CRITICAL CARE EVALUATION AND ICU ADMISSION    - UNCONTROLLED DM, GLUCOSE INTOLERANCE DUE TO STEROIDS  -  LANTUS, SSI + FS     - SMOKER, COUNSELLED TO QUIT SMOKING     - GI AND DVT PROPHYLAXIS

## 2022-03-19 NOTE — CHART NOTE - NSCHARTNOTEFT_GEN_A_CORE
Hospital Course:  Patient is a 61 year old Male, from NH, active smoker PMHx of CAD, CHF on BIPAP at NH, COPD (intubation in past), peripheral neuropathy, GERD, HTN, HLD, obesity, polyarthritis, Pulmonary HTN, TIA, Vit D Def, came with chief complain of shortness of breath. ICU was consulted for increased worsening hypoxia and increase work of breathing  Recent admission 3/9.  for shortness of breath for one month. BIPAP machine non compliant vs mulfunction. Patient admitted to ICU for Acute Hypercapnic Respiratory Failure secondary to COPD exacerbation, agitated on BIPAP and intubated for airway protection, started on symbicort, duonebs q6, solumedrol BID, ceftriaxone and azithromycin.    Patient was then extubated on 3/11 to BIPAP for a few hours then maintained on BIPAP at night.   Mood was stable on Seroquel, in addition to home medications: xanax PRN and trazodone. Patient remained appropriate post extubation and with BIPAP at night with stable vitals in the day, hence downgraded 3/11 after which he was stable for discharge 3/15 back to [facility] for out patient follow up with Pulmonologist.  Returned 3 days later 3/18 after resuming cigarette smoking and not using BIPAP, c/o SOB. ABG showed 7.3/107/76/53 consistent with hypercapnic respiratory failure. However patient's neuro function remained stable, no obtundation, AAOx3 with insight. ICU was consulted for BIPAP and monitoring.  ICU COURSE  BIPAP throughout out, settings at 14-15/5 sats 88-92  Tolerated 2L onf NC in the AM of 3/19, placed back on BIPAP for sats < 88  Had no complaints of SOB, and insistent on leaving AMA.   Otherwise also on Solumedrol 40q8, symbicort, spiriva, albuterol, Labs and other vital signs stable.  Patient now has a nicotine patch, and psych meds restarted: trazodone and Xanax  Patient also has chronic back pain for which he is on percocet at NH, - also continued  Stable for dg to medical floors to continue BIPAP nocturnally and f/u with pulm and address NH compliance.    For Accepting Team Follow Up:   - A Hospital Course:  Mr. Sumner is a 61 year old man from [facility], active smoker, with a PMHx of CAD, CHF on nocturnal BIPAP, COPD (intubation in past), peripheral neuropathy, GERD, HTN, HLD, obesity, polyarthritis, Pulmonary HTN, TIA, Vit D Def, and [facility] respiratory failure requiring intubation (3/9 - 15), who re-presented to Critical access hospital on 3/18 for acute on chronic respiratory failure with hypoxia and hypercapnia. Patient reports that while after this past discharge he began smoking again and was not adherent with his BiPAP, prompting the re-admission. Admitted from ED to ICU for new BiPAP and close monitoring due to concern for re-intubation. In ICU was found to have hypercapnia and elevated serum bicarb, mild improvement after resuming nocutral BiPAP. Re-started on Solumedrol 40q8, continued on symbicort, spiriva, and albuterol.   Currently, patient is awake, alert, oriented, anxious/agitated, refusing to use BiPAP, SPO2 greater than 88% while on NC at rest. After discussing potential risks for worsening hypercapnia, also continued on his home Xanax and Oxycodone, added on Trazadone and       ICU COURSE  BIPAP throughout out, settings at 14-15/5 sats 88-92  Tolerated 2L onf NC in the AM of 3/19, placed back on BIPAP for sats < 88  Had no complaints of SOB, and insistent on leaving AMA.   Otherwise also on Solumedrol 40q8, symbicort, spiriva, albuterol, Labs and other vital signs stable.  Patient now has a nicotine patch, and psych meds restarted: trazodone and Xanax  Patient also has chronic back pain for which he is on percocet at NH, - also continued  Stable for dg to medical floors to continue BIPAP nocturnally and f/u with pulm and address NH compliance.    For Accepting Team Follow Up:   - A Hospital Course:  Mr. Sumner is a 61 year old man from [facility], active smoker, with a PMHx of CAD, CHF on nocturnal BIPAP, COPD (intubation in past), peripheral neuropathy, GERD, HTN, HLD, obesity, polyarthritis, Pulmonary HTN, TIA, Vit D Def, and recent admission to [facility] for respiratory failure requiring intubation (3/9 - 15), who re-presented to [facility] on 3/18 for acute on chronic respiratory failure with hypoxia and hypercapnia. Patient reports that while after this past discharge he began smoking again and was not adherent with his BiPAP, prompting the re-admission. Admitted from ED to ICU for new BiPAP and close monitoring due to concern for re-intubation. In ICU was found to have hypercapnia and elevated serum bicarb, mild improvement after resuming nocturnal BiPAP. Re-started on Solumedrol 40q8, continued on Symbicort, Spiriva, and albuterol.   Currently, patient is awake, alert, oriented, anxious/agitated, refusing to use BiPAP, SPO2 greater than 88% while on NC at rest. After discussing potential risks for worsening hypercapnia, also continued on his home Xanax and Oxycodone, added on Trazadone and Nicotine patch to assist with anxiety.   Short course of Diamox to assist with diuresis and lower serum bicarb, hopefully to lead to less CO2 retention.     For Accepting Team Follow Up:   - Continue steroid course.   - Nocturnal and BiPAP PRN.   - Continue patient education on smoking cessation and adherence to BiPAP.   - Follow up repeat BMP and ABG in am after Diamox 250 x2 doses. Hospital Course:  Mr. Sumner is a 61 year old man from [facility], active smoker, with a PMHx of CAD, CHF on nocturnal BIPAP, COPD (intubation in past), peripheral neuropathy, GERD, HTN, HLD, obesity, polyarthritis, Pulmonary HTN, TIA, Vit D Def, and recent admission to [facility] access hospital for respiratory failure requiring intubation (3/9 - 15), who re-presented to Critical access hospital on 3/18 for acute on chronic respiratory failure with hypoxia and hypercapnia. Patient reports that while after this past discharge he began smoking again and was not adherent with his BiPAP, prompting the re-admission. Admitted from ED to ICU for new BiPAP and close monitoring due to concern for re-intubation. In ICU was found to have hypercapnia and elevated serum bicarb, mild improvement after resuming nocturnal BiPAP. Re-started on Solumedrol 40q8, continued on Symbicort, Spiriva, and albuterol.   Currently, patient is awake, alert, oriented, anxious/agitated, refusing to use BiPAP, SPO2 greater than 88% while on NC at rest. After discussing potential risks for worsening hypercapnia, also continued on his home Xanax and Oxycodone, added on Trazadone and Nicotine patch to assist with anxiety.   Short course of Diamox to assist with diuresis and lower serum bicarb, hopefully to lead to less CO2 retention.     For Accepting Team Follow Up:   - Continue steroid course.   - Nocturnal and BiPAP PRN.   - Continue patient education on smoking cessation and adherence to BiPAP.   - Follow up repeat BMP and ABG in am after Diamox 250 x2 doses.    Dr. Torrez to remain as attending physician. Sign out provided to AI/SCU DEBBIE Maya. Hospital Course:  Mr. Sumner is a 61 year old man from [facility], active smoker, with a PMHx of CAD, CHF on nocturnal BIPAP, COPD (intubation in past), peripheral neuropathy, GERD, HTN, HLD, obesity, polyarthritis, Pulmonary HTN, TIA, Vit D Def, and [facility] for respiratory failure requiring intubation (3/9 - 15), who re-presented to [facility] on 3/18 for acute on chronic respiratory failure with hypoxia and hypercapnia. Patient reports that while after this past discharge he began smoking again and was not adherent with his BiPAP, prompting the re-admission. Admitted from ED to ICU for new BiPAP and close monitoring due to concern for re-intubation. In ICU was found to have hypercapnia and elevated serum bicarb, mild improvement after resuming nocturnal BiPAP. Re-started on Solumedrol 40q8, continued on Symbicort, Spiriva, and albuterol.   Currently, patient is awake, alert, oriented, anxious/agitated, refusing to use BiPAP, SPO2 greater than 88% while on NC at rest. After discussing potential risks for worsening hypercapnia, also continued on his home Xanax and Oxycodone, added on Trazadone and Nicotine patch to assist with anxiety.   Short course of Diamox to assist with diuresis and lower serum bicarb, hopefully to lead to less CO2 retention.     For Accepting Team Follow Up:   - Continue steroid course.   - Nocturnal and BiPAP PRN.   - Continue patient education on smoking cessation and adherence to BiPAP.   - Follow up repeat BMP and ABG in am after Diamox 250 x2 doses.    Dr. Torrez to remain as attending physician. Sign out provided to AI/SCU DEBBIE Maya.  Message left with emergency contact Sweta to provide an update.

## 2022-03-20 ENCOUNTER — TRANSCRIPTION ENCOUNTER (OUTPATIENT)
Age: 62
End: 2022-03-20

## 2022-03-20 DIAGNOSIS — J96.01 ACUTE RESPIRATORY FAILURE WITH HYPOXIA: ICD-10-CM

## 2022-03-20 DIAGNOSIS — J44.1 CHRONIC OBSTRUCTIVE PULMONARY DISEASE WITH (ACUTE) EXACERBATION: ICD-10-CM

## 2022-03-20 DIAGNOSIS — G62.9 POLYNEUROPATHY, UNSPECIFIED: ICD-10-CM

## 2022-03-20 DIAGNOSIS — I10 ESSENTIAL (PRIMARY) HYPERTENSION: ICD-10-CM

## 2022-03-20 DIAGNOSIS — Z29.9 ENCOUNTER FOR PROPHYLACTIC MEASURES, UNSPECIFIED: ICD-10-CM

## 2022-03-20 DIAGNOSIS — F17.200 NICOTINE DEPENDENCE, UNSPECIFIED, UNCOMPLICATED: ICD-10-CM

## 2022-03-20 DIAGNOSIS — I50.9 HEART FAILURE, UNSPECIFIED: ICD-10-CM

## 2022-03-20 DIAGNOSIS — I27.20 PULMONARY HYPERTENSION, UNSPECIFIED: ICD-10-CM

## 2022-03-20 DIAGNOSIS — I25.10 ATHEROSCLEROTIC HEART DISEASE OF NATIVE CORONARY ARTERY WITHOUT ANGINA PECTORIS: ICD-10-CM

## 2022-03-20 DIAGNOSIS — Z02.9 ENCOUNTER FOR ADMINISTRATIVE EXAMINATIONS, UNSPECIFIED: ICD-10-CM

## 2022-03-20 DIAGNOSIS — E11.9 TYPE 2 DIABETES MELLITUS WITHOUT COMPLICATIONS: ICD-10-CM

## 2022-03-20 LAB
ANION GAP SERPL CALC-SCNC: 2 MMOL/L — LOW (ref 5–17)
BUN SERPL-MCNC: 22 MG/DL — HIGH (ref 7–18)
CALCIUM SERPL-MCNC: 8.9 MG/DL — SIGNIFICANT CHANGE UP (ref 8.4–10.5)
CHLORIDE SERPL-SCNC: 94 MMOL/L — LOW (ref 96–108)
CO2 SERPL-SCNC: 39 MMOL/L — HIGH (ref 22–31)
CREAT SERPL-MCNC: 0.67 MG/DL — SIGNIFICANT CHANGE UP (ref 0.5–1.3)
EGFR: 108 ML/MIN/1.73M2 — SIGNIFICANT CHANGE UP
GAS PNL BLDA: SIGNIFICANT CHANGE UP
GLUCOSE SERPL-MCNC: 333 MG/DL — HIGH (ref 70–99)
HCT VFR BLD CALC: 41.2 % — SIGNIFICANT CHANGE UP (ref 39–50)
HGB BLD-MCNC: 12.7 G/DL — LOW (ref 13–17)
MAGNESIUM SERPL-MCNC: 2.2 MG/DL — SIGNIFICANT CHANGE UP (ref 1.6–2.6)
MCHC RBC-ENTMCNC: 28.2 PG — SIGNIFICANT CHANGE UP (ref 27–34)
MCHC RBC-ENTMCNC: 30.8 GM/DL — LOW (ref 32–36)
MCV RBC AUTO: 91.6 FL — SIGNIFICANT CHANGE UP (ref 80–100)
NRBC # BLD: 0 /100 WBCS — SIGNIFICANT CHANGE UP (ref 0–0)
PHOSPHATE SERPL-MCNC: 3.9 MG/DL — SIGNIFICANT CHANGE UP (ref 2.5–4.5)
PLATELET # BLD AUTO: 226 K/UL — SIGNIFICANT CHANGE UP (ref 150–400)
POTASSIUM SERPL-MCNC: 4.5 MMOL/L — SIGNIFICANT CHANGE UP (ref 3.5–5.3)
POTASSIUM SERPL-SCNC: 4.5 MMOL/L — SIGNIFICANT CHANGE UP (ref 3.5–5.3)
RBC # BLD: 4.5 M/UL — SIGNIFICANT CHANGE UP (ref 4.2–5.8)
RBC # FLD: 12.7 % — SIGNIFICANT CHANGE UP (ref 10.3–14.5)
SODIUM SERPL-SCNC: 135 MMOL/L — SIGNIFICANT CHANGE UP (ref 135–145)
WBC # BLD: 9.99 K/UL — SIGNIFICANT CHANGE UP (ref 3.8–10.5)
WBC # FLD AUTO: 9.99 K/UL — SIGNIFICANT CHANGE UP (ref 3.8–10.5)

## 2022-03-20 PROCEDURE — 71045 X-RAY EXAM CHEST 1 VIEW: CPT | Mod: 26

## 2022-03-20 PROCEDURE — 99497 ADVNCD CARE PLAN 30 MIN: CPT

## 2022-03-20 RX ORDER — INSULIN LISPRO 100/ML
4 VIAL (ML) SUBCUTANEOUS ONCE
Refills: 0 | Status: COMPLETED | OUTPATIENT
Start: 2022-03-20 | End: 2022-03-20

## 2022-03-20 RX ORDER — AZITHROMYCIN 500 MG/1
500 TABLET, FILM COATED ORAL DAILY
Refills: 0 | Status: DISCONTINUED | OUTPATIENT
Start: 2022-03-20 | End: 2022-03-21

## 2022-03-20 RX ORDER — ATORVASTATIN CALCIUM 80 MG/1
40 TABLET, FILM COATED ORAL AT BEDTIME
Refills: 0 | Status: DISCONTINUED | OUTPATIENT
Start: 2022-03-20 | End: 2022-03-21

## 2022-03-20 RX ORDER — INSULIN GLARGINE 100 [IU]/ML
12 INJECTION, SOLUTION SUBCUTANEOUS EVERY MORNING
Refills: 0 | Status: DISCONTINUED | OUTPATIENT
Start: 2022-03-20 | End: 2022-03-21

## 2022-03-20 RX ORDER — INSULIN LISPRO 100/ML
3 VIAL (ML) SUBCUTANEOUS
Refills: 0 | Status: DISCONTINUED | OUTPATIENT
Start: 2022-03-20 | End: 2022-03-21

## 2022-03-20 RX ADMIN — Medication 1 PUFF(S): at 14:30

## 2022-03-20 RX ADMIN — SIMETHICONE 80 MILLIGRAM(S): 80 TABLET, CHEWABLE ORAL at 17:06

## 2022-03-20 RX ADMIN — Medication 3: at 08:01

## 2022-03-20 RX ADMIN — Medication 150 MILLIGRAM(S): at 21:15

## 2022-03-20 RX ADMIN — PANTOPRAZOLE SODIUM 40 MILLIGRAM(S): 20 TABLET, DELAYED RELEASE ORAL at 06:30

## 2022-03-20 RX ADMIN — GABAPENTIN 100 MILLIGRAM(S): 400 CAPSULE ORAL at 05:16

## 2022-03-20 RX ADMIN — Medication 4 UNIT(S): at 17:10

## 2022-03-20 RX ADMIN — INSULIN GLARGINE 10 UNIT(S): 100 INJECTION, SOLUTION SUBCUTANEOUS at 08:42

## 2022-03-20 RX ADMIN — Medication 1 PUFF(S): at 03:14

## 2022-03-20 RX ADMIN — Medication 1 PATCH: at 08:57

## 2022-03-20 RX ADMIN — SIMETHICONE 80 MILLIGRAM(S): 80 TABLET, CHEWABLE ORAL at 05:16

## 2022-03-20 RX ADMIN — Medication 3 UNIT(S): at 17:10

## 2022-03-20 RX ADMIN — ALBUTEROL 2 PUFF(S): 90 AEROSOL, METERED ORAL at 10:33

## 2022-03-20 RX ADMIN — SENNA PLUS 2 TABLET(S): 8.6 TABLET ORAL at 21:16

## 2022-03-20 RX ADMIN — GABAPENTIN 100 MILLIGRAM(S): 400 CAPSULE ORAL at 14:26

## 2022-03-20 RX ADMIN — Medication 0.5 MILLIGRAM(S): at 17:06

## 2022-03-20 RX ADMIN — BUDESONIDE AND FORMOTEROL FUMARATE DIHYDRATE 2 PUFF(S): 160; 4.5 AEROSOL RESPIRATORY (INHALATION) at 10:34

## 2022-03-20 RX ADMIN — Medication 1 PATCH: at 12:11

## 2022-03-20 RX ADMIN — Medication 81 MILLIGRAM(S): at 11:38

## 2022-03-20 RX ADMIN — Medication 1 PUFF(S): at 10:35

## 2022-03-20 RX ADMIN — SIMETHICONE 80 MILLIGRAM(S): 80 TABLET, CHEWABLE ORAL at 23:38

## 2022-03-20 RX ADMIN — SIMETHICONE 80 MILLIGRAM(S): 80 TABLET, CHEWABLE ORAL at 11:38

## 2022-03-20 RX ADMIN — Medication 5: at 17:09

## 2022-03-20 RX ADMIN — ALBUTEROL 2 PUFF(S): 90 AEROSOL, METERED ORAL at 20:45

## 2022-03-20 RX ADMIN — Medication 1 PATCH: at 11:37

## 2022-03-20 RX ADMIN — Medication 3 UNIT(S): at 11:38

## 2022-03-20 RX ADMIN — HEPARIN SODIUM 5000 UNIT(S): 5000 INJECTION INTRAVENOUS; SUBCUTANEOUS at 05:16

## 2022-03-20 RX ADMIN — Medication 4: at 21:17

## 2022-03-20 RX ADMIN — ALBUTEROL 2 PUFF(S): 90 AEROSOL, METERED ORAL at 03:14

## 2022-03-20 RX ADMIN — GABAPENTIN 100 MILLIGRAM(S): 400 CAPSULE ORAL at 21:15

## 2022-03-20 RX ADMIN — HEPARIN SODIUM 5000 UNIT(S): 5000 INJECTION INTRAVENOUS; SUBCUTANEOUS at 21:17

## 2022-03-20 RX ADMIN — ATORVASTATIN CALCIUM 40 MILLIGRAM(S): 80 TABLET, FILM COATED ORAL at 21:16

## 2022-03-20 RX ADMIN — BUDESONIDE AND FORMOTEROL FUMARATE DIHYDRATE 2 PUFF(S): 160; 4.5 AEROSOL RESPIRATORY (INHALATION) at 21:16

## 2022-03-20 RX ADMIN — ALBUTEROL 2 PUFF(S): 90 AEROSOL, METERED ORAL at 14:29

## 2022-03-20 RX ADMIN — Medication 0.5 MILLIGRAM(S): at 05:16

## 2022-03-20 RX ADMIN — AZITHROMYCIN 255 MILLIGRAM(S): 500 TABLET, FILM COATED ORAL at 14:25

## 2022-03-20 RX ADMIN — Medication 1 PUFF(S): at 20:45

## 2022-03-20 RX ADMIN — OXYCODONE AND ACETAMINOPHEN 1 TABLET(S): 5; 325 TABLET ORAL at 11:10

## 2022-03-20 RX ADMIN — LATANOPROST 1 DROP(S): 0.05 SOLUTION/ DROPS OPHTHALMIC; TOPICAL at 21:18

## 2022-03-20 RX ADMIN — Medication 3: at 11:38

## 2022-03-20 RX ADMIN — Medication 40 MILLIGRAM(S): at 06:31

## 2022-03-20 RX ADMIN — OXYCODONE AND ACETAMINOPHEN 1 TABLET(S): 5; 325 TABLET ORAL at 10:10

## 2022-03-20 RX ADMIN — HEPARIN SODIUM 5000 UNIT(S): 5000 INJECTION INTRAVENOUS; SUBCUTANEOUS at 14:26

## 2022-03-20 RX ADMIN — Medication 1 PATCH: at 20:15

## 2022-03-20 NOTE — DISCHARGE NOTE PROVIDER - NSDCMRMEDTOKEN_GEN_ALL_CORE_FT
ALPRAZolam 0.5 mg oral tablet: 1 tab(s) orally 2 times a day MDD:2  Artificial Tears ophthalmic solution: 1 dose(s) to each affected eye once a day  aspirin 81 mg oral tablet: 1 tab(s) orally once a day  Basaglar KwikPen 100 units/mL subcutaneous solution: 8 unit(s) subcutaneous once a day (at bedtime)   DuoNeb 0.5 mg-2.5 mg/3 mL inhalation solution: 1 scoop(s) inhaled 4 times a day  gabapentin 100 mg oral capsule: 1 cap(s) orally 3 times a day  insulin lispro 100 units/mL injectable solution: 3 unit(s) injectable 3 times a day (before meals)   latanoprost 0.005% ophthalmic solution: 1 drop(s) to both  eye once a day (in the evening)  Melatonin 3 mg oral tablet: 1 tab(s) orally once a day (at bedtime)  MiraLax oral powder for reconstitution:   multivitamin with minerals:   oxycodone-acetaminophen 5 mg-325 mg oral tablet: 1 tab(s) orally every 6 hours, As needed, Moderate Pain (4 - 6) MDD:MAX 4 tabs  predniSONE 10 mg oral tablet: 4 tab(s) orally once a day x 4 days  3 tab(s) orally once a day x 4 days  2 tab(s) orally once a day x 4 days  1 tab(s) orally once a day x 4 days  Proventil 2.5 mg/3 mL (0.083%) inhalation solution: 2 puff(s) inhaled every 6 hours, As Needed  senna oral tablet: 2 tab(s) orally once a day (at bedtime)  simethicone 80 mg oral tablet: 1 tab(s) orally 4 times a day (after meals and at bedtime)  Symbicort 160 mcg-4.5 mcg/inh inhalation aerosol: 2 puff(s) inhaled 2 times a day  traZODone 150 mg oral tablet: 1 tab(s) orally once a day (at bedtime)  Tylenol 325 mg oral capsule: 2 cap(s) orally 2 times a day   ALPRAZolam 0.5 mg oral tablet: 1 tab(s) orally 2 times a day MDD:2  Artificial Tears ophthalmic solution: 1 dose(s) to each affected eye once a day  aspirin 81 mg oral tablet: 1 tab(s) orally once a day  atorvastatin 40 mg oral tablet: 1 tab(s) orally once a day (at bedtime)  azithromycin 250 mg oral capsule: 1 cap(s) orally once a day for 1 more day on 3/22  Basaglar KwikPen 100 units/mL subcutaneous solution: 15 unit(s) subcutaneous once a day  DuoNeb 0.5 mg-2.5 mg/3 mL inhalation solution: 1 scoop(s) inhaled 4 times a day  gabapentin 100 mg oral capsule: 1 cap(s) orally 3 times a day  insulin lispro 100 units/mL injectable solution: 3 unit(s) injectable 3 times a day (before meals)   ipratropium CFC free 17 mcg/inh inhalation aerosol: 1 puff(s) inhaled every 6 hours  latanoprost 0.005% ophthalmic solution: 1 drop(s) to both  eye once a day (in the evening)  Melatonin 3 mg oral tablet: 1 tab(s) orally once a day (at bedtime)  MiraLax oral powder for reconstitution:   multivitamin with minerals:   nicotine 14 mg/24 hr transdermal film, extended release: 1  transdermal once a day   oxycodone-acetaminophen 5 mg-325 mg oral tablet: 1 tab(s) orally every 6 hours, As needed, Moderate Pain (4 - 6) MDD:MAX 4 tabs  pantoprazole 40 mg oral delayed release tablet: 1 tab(s) orally once a day (before a meal)  predniSONE 10 mg oral tablet: Taper as follows   4 tab(s) orally once a day x 4 days  3 tab(s) orally once a day x 4 days  2 tab(s) orally once a day x 4 days  1 tab(s) orally once a day x 4 days  0.5 tab orally once a day x 4 days  Proventil 2.5 mg/3 mL (0.083%) inhalation solution: 2 puff(s) inhaled every 6 hours, As Needed  senna oral tablet: 2 tab(s) orally once a day (at bedtime)  simethicone 80 mg oral tablet: 1 tab(s) orally 4 times a day (after meals and at bedtime)  Symbicort 160 mcg-4.5 mcg/inh inhalation aerosol: 2 puff(s) inhaled 2 times a day  traZODone 150 mg oral tablet: 1 tab(s) orally once a day (at bedtime)  Tylenol 325 mg oral capsule: 2 cap(s) orally 2 times a day   ALPRAZolam 0.5 mg oral tablet: 1 tab(s) orally 2 times a day MDD:2  Artificial Tears ophthalmic solution: 1 dose(s) to each affected eye once a day  aspirin 81 mg oral tablet: 1 tab(s) orally once a day  atorvastatin 40 mg oral tablet: 1 tab(s) orally once a day (at bedtime)  azithromycin 250 mg oral capsule: 1 cap(s) orally once a day for 1 more day on 3/22  Basaglar KwikPen 100 units/mL subcutaneous solution: 15 unit(s) subcutaneous once a day  DuoNeb 0.5 mg-2.5 mg/3 mL inhalation solution: 1 scoop(s) inhaled 4 times a day  gabapentin 100 mg oral capsule: 1 cap(s) orally 3 times a day  insulin lispro: 3 unit(s) subcutaneous 3 times a day with meals. Do not take if you are not eating.   ipratropium CFC free 17 mcg/inh inhalation aerosol: 1 puff(s) inhaled every 6 hours  latanoprost 0.005% ophthalmic solution: 1 drop(s) to both  eye once a day (in the evening)  Melatonin 3 mg oral tablet: 1 tab(s) orally once a day (at bedtime)  MiraLax oral powder for reconstitution:   Multiple Vitamins with Minerals oral tablet, chewable: 1 tab(s) orally once a day  nicotine 14 mg/24 hr transdermal film, extended release: 1 patch transdermal once a day  nicotine 14 mg/24 hr transdermal film, extended release: 1  transdermal once a day   oxycodone-acetaminophen 5 mg-325 mg oral tablet: 1 tab(s) orally every 6 hours, As needed, Moderate Pain (4 - 6) MDD:MAX 4 tabs  pantoprazole 40 mg oral delayed release tablet: 1 tab(s) orally once a day (before a meal)  predniSONE 10 mg oral tablet: Taper as follows   4 tab(s) orally once a day x 4 days  3 tab(s) orally once a day x 4 days  2 tab(s) orally once a day x 4 days  1 tab(s) orally once a day x 4 days  0.5 tab orally once a day x 4 days  Proventil 2.5 mg/3 mL (0.083%) inhalation solution: 2 puff(s) inhaled every 6 hours, As Needed  senna oral tablet: 2 tab(s) orally once a day (at bedtime)  simethicone 80 mg oral tablet: 1 tab(s) orally 4 times a day (after meals and at bedtime)  Symbicort 160 mcg-4.5 mcg/inh inhalation aerosol: 2 puff(s) inhaled 2 times a day  traZODone 150 mg oral tablet: 1 tab(s) orally once a day (at bedtime)  Tylenol 325 mg oral capsule: 2 cap(s) orally 2 times a day

## 2022-03-20 NOTE — PROGRESS NOTE ADULT - PROBLEM SELECTOR PLAN 9
Pt from [facility] noncompliant with BIPAP, active smoker.   Counseled pt on adherence to BIPAP and smoke cessation. Pt verbalizes understanding. Pt from [facility] noncompliant with BIPAP, active smoker.   Counseled pt on adherence to BIPAP use and smoke cessation. Pt verbalizes understanding. Continue nicotine  Smoke cessation

## 2022-03-20 NOTE — PROGRESS NOTE ADULT - PROBLEM SELECTOR PLAN 2
Continue BIPAP qHS and prn.   Continue supplemental O2 2L NC for SpO2 < 89%  Continue steroid, change to Prednisone with taper x 5 days.   Continue inhalers: Albuterol, tiotropium , budesonide    Continue Azithromycin for possible  PNA  Monitor oxygenation Continue BIPAP qHS and prn.   Continue supplemental O2 2L NC for SpO2 < 89%  Continue steroid, change to Prednisone with taper   Continue inhalers: Albuterol, tiotropium , budesonide    Continue Azithromycin for possible  PNA  Monitor oxygenation

## 2022-03-20 NOTE — DISCHARGE NOTE PROVIDER - NSDCCPCAREPLAN_GEN_ALL_CORE_FT
PRINCIPAL DISCHARGE DIAGNOSIS  Diagnosis: Acute respiratory failure with hypoxia and hypercapnia  Assessment and Plan of Treatment: due to COPD exacerbation, CHF, possible pneumonia.   Continue medications , steroid taper, inhalers  as prescribed by your doctor.   Continue BIPAP at bedtime and as needed.   Continue supplemental oxygen as prescribed by your doctor.   Follow-up with your primary care physician within 1 week.   Report to your doctor or seek immediate medical attention if you develop any changes in your condition and or worsening signs/symptoms (such as and not limited : shortness of breath, cough, fever).        SECONDARY DISCHARGE DIAGNOSES  Diagnosis: COPD exacerbation  Assessment and Plan of Treatment: Take all inhalers as prescribed by your Health Care Provider.  Take steroids as prescribed by your Health Care Provider.  If your cough increases infrequency and severity and/or you have shortness of breath or increased shortness of breath call your Health Care Provider.  If you develop fever, chills, night sweats, malaise, and/or change in mental status call your Health care Provider.  Nutrition is very important.  Eat small frequent meals.  Increase your activity as tolerated.  Do not stay in bed all day      Diagnosis: CHF (congestive heart failure)  Assessment and Plan of Treatment: If you gain 3lbs in 3 days, or 5lbs in a week call your Health Care Provider.  Do not eat or drink foods containing more than 2000mg of salt (sodium) in your diet every day.  Call your Health Care Provider if you have any swelling or increased swelling in your feet, ankles, and/or stomach.  Take all of your medication as directed.  If you become dizzy call your Health Care Provider.      Diagnosis: Pulmonary HTN  Assessment and Plan of Treatment: Continue medications  , inhalers, supplemental oxygen as prescribed by your doctor.   Follow-up with your primary care physician within 1 week.   Report to your doctor or seek immediate medical attention if you develop any changes in your condition and or worsening signs/symptoms (such as and not limited : shortness of breath, cough, fever).      Diagnosis: Type 2 diabetes mellitus without complication, with long-term current use of insulin  Assessment and Plan of Treatment: HgA1C this admission 7.4  Make sure you get your HgA1c checked every three months.  If you take oral diabetes medications, check your blood glucose two times a day.  If you take insulin, check your blood glucose before meals and at bedtime.  It's important not to skip any meals.  Keep a log of your blood glucose results and always take it with you to your doctor appointments.  Keep a list of your current medications including injectables and over the counter medications and bring this medication list with you to all your doctor appointments.  If you have not seen your ophthalmologist this year call for appointment.  Check your feet daily for redness, sores, or openings. Do not self treat. If no improvement in two days call your primary care physician for an appointment.  Low blood sugar (hypoglycemia) is a blood sugar below 70mg/dl. Check your blood sugar if you feel signs/symptoms of hypoglycemia. If your blood sugar is below 70 take 15 grams of carbohydrates (ex 4 oz of apple juice, 3-4 glucose tablets, or 4-6 oz of regular soda) wait 15 minutes and repeat blood sugar to make sure it comes up above 70.  If your blood sugar is above 70 and you are due for a meal, have a meal.  If you are not due for a meal have a snack.  This snack helps keeps your blood sugar at a safe range.      Diagnosis: Coronary artery disease involving native coronary artery of native heart without angina pectoris  Assessment and Plan of Treatment: Coronary artery disease is a condition where the arteries the supply the heart muscle gets clogged with fatty deposits & puts you at risk for a heart attack. Continue with medications as prescribed.   Call your doctor if you have any new pain, pressure, or discomfort in the center of your chest, pain, tingling or discomfort in arms, back, neck, jaw, or stomach, shortness of breath, nausea, vomiting, burping or heartburn, sweating, cold and clammy skin, racing or abnormal heartbeat for more than 10 minutes or if they keep coming & going.  Call 911 and do not tr to get to hospital by care  You can help yourself with lifestyle changes (quitting smoking if you smoke), eat lots of fruits & vegetables & low fat dairy products, not a lot of meat & fatty foods, walk or some form of physical activity most days of the week, lose weight if you are overweight  Take your cardiac medication as prescribed to lower cholesterol, to lower blood pressure, aspirin to prevent blood clots, and diabetes control  Make sure to keep appointments with doctor for cardiac follow up care

## 2022-03-20 NOTE — DISCHARGE NOTE PROVIDER - HOSPITAL COURSE
61 year old man from [facility], active smoker, with a PMHx of CAD, CHF on nocturnal BIPAP, COPD (intubation in past), peripheral neuropathy, GERD, HTN, HLD, obesity, polyarthritis, Pulmonary HTN, TIA, Vit D Def, and recent admission to [facility] for respiratory failure requiring intubation (3/9 - 15), who re-presented to [facility] on 3/18 for acute on chronic respiratory failure with hypoxia and hypercapnia.  Patient reports that while after this past discharge he began smoking again and was not adherent with his BiPAP, prompting the re-admission. Admitted from ED to ICU for new BiPAP and close monitoring due to concern for re-intubation. In ICU was found to have hypercapnia and elevated serum bicarb, mild improvement after resuming nocturnal BiPAP. Re-started on Solumedrol 40q8h, continued on Symbicort, Spiriva, and albuterol.   Pt improved and downgraded to SCU 3/19.    61 year old man from [facility], active smoker, with a PMHx of CAD, CHF on nocturnal BIPAP, COPD (intubation in past), peripheral neuropathy, GERD, HTN, HLD, obesity, polyarthritis, Pulmonary HTN, TIA, Vit D Def, and recent admission to [facility] for respiratory failure requiring intubation (3/9 - 15), who re-presented to [facility] on 3/18 for acute on chronic respiratory failure with hypoxia and hypercapnia.  Patient reports that while after this past discharge he began smoking again and was not adherent with his BiPAP, prompting the re-admission. Admitted from ED to ICU for new BiPAP and close monitoring due to concern for re-intubation. In ICU was found to have hypercapnia and elevated serum bicarb, mild improvement after resuming nocturnal BiPAP. Re-started on Solumedrol 40q8h, continued on Symbicort, Spiriva, and albuterol.   Pt improved and downgraded to SCU 3/19.    Hospital course complicated by uncontrolled hyperglycemia due to steroid intake. Insulins increased as needed. Continue Lantus 15 units at [facility].     Given pt's improvement, pt is medically optimized for discharge to [facility]. PT evaluated and endorses no skilled PT needs.   Continue azithromycin for 1 more day on 3/22. Continue prednisone with taper. Continue inhalers, BIPAP at bedtime, supplemental oxygen.    Follow-up with PCP . Plan discussed with Dr. Ramos.

## 2022-03-20 NOTE — DISCHARGE NOTE PROVIDER - CARE PROVIDER_API CALL
Raman Ramos)  Medicine  [address] [place] [address]  Phone: [phone]  Fax: [phone]  Follow Up Time:

## 2022-03-20 NOTE — GOALS OF CARE CONVERSATION - ADVANCED CARE PLANNING - CONVERSATION DETAILS
61 year old man from [facility], active smoker, with a PMHx of CAD, CHF on nocturnal BIPAP, COPD (intubation in past), peripheral neuropathy, GERD, HTN, HLD, obesity, polyarthritis, Pulmonary HTN, TIA, Vit D Def, and recent admission to [facility] for respiratory failure requiring intubation (3/9 - 15), who re-presented to [facility] on 3/18 for acute on chronic respiratory failure with hypoxia and hypercapnia. Patient reports that while after this past discharge he began smoking again and was not adherent with his BiPAP, prompting the re-admission. Admitted from ED to ICU for new BiPAP and close monitoring due to concern for re-intubation. In ICU was found to have hypercapnia and elevated serum bicarb, mild improvement after resuming nocturnal BiPAP. Re-started on Solumedrol 40q8, continued on Symbicort, Spiriva, and albuterol.     Pt endorses he has a living will , and HCP /POA is his sister  Chiquita Merrill who knows his wishes. Pt agrees to complete and sign HCP form . When asked about resuscitation , pt endorses CPR and intubation /mechanical ventilation .   When asked about smoke cessation, pt endorses: I haven't had it last 3 days. I will stop.

## 2022-03-20 NOTE — PROGRESS NOTE ADULT - SUBJECTIVE AND OBJECTIVE BOX
DEVIKA CARBALLO    SCU progress note    INTERVAL HPI/OVERNIGHT EVENTS: ***    DNR [ ]   DNI  [  ]    Covid - 19 PCR: SARS-CoV-2: NotDetec (18 Mar 2022 11:07)  COVID-19 PCR: NotDetec (14 Mar 2022 11:17)  COVID-19 PCR: NotDetec (09 Mar 2022 12:42)  SARS-CoV-2: NotDetec (13 Nov 2021 09:39)      The 4Ms    What Matters Most: see GOC  Age appropriate Medications/Screen for High Risk Medication: Yes  Mentation: see CAM below  Mobility: defer to physical exam    The Confusion Assessment Method (CAM) Diagnostic Algorithm     1: Acute Onset or Fluctuating Course  - Is there evidence of an acute change in mental status from the patient’s baseline? Did the (abnormal) behavior  fluctuate during the day, that is, tend to come and go, or increase and decrease in severity?  [ ] YES [ ] NO     2: Inattention  - Did the patient have difficulty focusing attention, being easily distractible, or having difficulty keeping track of what was being said?  [ ] YES [ ] NO     3: Disorganized thinking  -Was the patient’s thinking disorganized or incoherent, such as rambling or irrelevant conversation, unclear or illogical flow of ideas, or unpredictable switching from subject to subject?  [ ] YES [ ] NO    4: Altered Level of consciousness?  [ ] YES [ ] NO    The diagnosis of delirium by CAM requires the presence of features 1 and 2 and either 3 or 4.    PRESSORS: [ ] YES [ ] NO  azithromycin  IVPB 500 milliGRAM(s) IV Intermittent every 24 hours  azithromycin  IVPB        Cardiovascular:  Heart Failure  Acute   Acute on Chronic  Chronic         Pulmonary:  ALBUTerol    90 MICROgram(s) HFA Inhaler 2 Puff(s) Inhalation every 6 hours  budesonide 160 MICROgram(s)/formoterol 4.5 MICROgram(s) Inhaler 2 Puff(s) Inhalation two times a day  ipratropium 17 MICROgram(s) HFA Inhaler 1 Puff(s) Inhalation every 6 hours    Hematalogic:  aspirin enteric coated 81 milliGRAM(s) Oral daily  heparin   Injectable 5000 Unit(s) SubCutaneous every 8 hours    Other:  acetaminophen     Tablet .. 650 milliGRAM(s) Oral every 6 hours PRN  ALPRAZolam 0.5 milliGRAM(s) Oral two times a day  gabapentin 100 milliGRAM(s) Oral three times a day  insulin glargine Injectable (LANTUS) 10 Unit(s) SubCutaneous every morning  insulin lispro (ADMELOG) corrective regimen sliding scale   SubCutaneous three times a day before meals  insulin lispro (ADMELOG) corrective regimen sliding scale   SubCutaneous at bedtime  latanoprost 0.005% Ophthalmic Solution 1 Drop(s) Both EYES at bedtime  methylPREDNISolone sodium succinate Injectable 40 milliGRAM(s) IV Push every 8 hours  nicotine -  14 mG/24Hr(s) Patch 1 patch Transdermal daily  oxycodone    5 mG/acetaminophen 325 mG 1 Tablet(s) Oral every 6 hours PRN  pantoprazole    Tablet 40 milliGRAM(s) Oral before breakfast  senna 2 Tablet(s) Oral at bedtime  simethicone 80 milliGRAM(s) Chew four times a day  traZODone 150 milliGRAM(s) Oral at bedtime    acetaminophen     Tablet .. 650 milliGRAM(s) Oral every 6 hours PRN  ALBUTerol    90 MICROgram(s) HFA Inhaler 2 Puff(s) Inhalation every 6 hours  ALPRAZolam 0.5 milliGRAM(s) Oral two times a day  aspirin enteric coated 81 milliGRAM(s) Oral daily  azithromycin  IVPB 500 milliGRAM(s) IV Intermittent every 24 hours  azithromycin  IVPB      budesonide 160 MICROgram(s)/formoterol 4.5 MICROgram(s) Inhaler 2 Puff(s) Inhalation two times a day  gabapentin 100 milliGRAM(s) Oral three times a day  heparin   Injectable 5000 Unit(s) SubCutaneous every 8 hours  insulin glargine Injectable (LANTUS) 10 Unit(s) SubCutaneous every morning  insulin lispro (ADMELOG) corrective regimen sliding scale   SubCutaneous three times a day before meals  insulin lispro (ADMELOG) corrective regimen sliding scale   SubCutaneous at bedtime  ipratropium 17 MICROgram(s) HFA Inhaler 1 Puff(s) Inhalation every 6 hours  latanoprost 0.005% Ophthalmic Solution 1 Drop(s) Both EYES at bedtime  methylPREDNISolone sodium succinate Injectable 40 milliGRAM(s) IV Push every 8 hours  nicotine -  14 mG/24Hr(s) Patch 1 patch Transdermal daily  oxycodone    5 mG/acetaminophen 325 mG 1 Tablet(s) Oral every 6 hours PRN  pantoprazole    Tablet 40 milliGRAM(s) Oral before breakfast  senna 2 Tablet(s) Oral at bedtime  simethicone 80 milliGRAM(s) Chew four times a day  traZODone 150 milliGRAM(s) Oral at bedtime    Drug Dosing Weight  Height (cm): 172.7 (18 Mar 2022 10:31)  Weight (kg): 90.7 (18 Mar 2022 10:31)  BMI (kg/m2): 30.4 (18 Mar 2022 10:31)  BSA (m2): 2.04 (18 Mar 2022 10:31)    CENTRAL LINE: [ ] YES [ ] NO  LOCATION:   DATE INSERTED:  REMOVE: [ ] YES [ ] NO  EXPLAIN:    AMOR: [ ] YES [ ] NO    DATE INSERTED:  REMOVE:  [ ] YES [ ] NO  EXPLAIN:    PAST MEDICAL & SURGICAL HISTORY:  COPD (chronic obstructive pulmonary disease)  Oxygen 2-3L at home    Stented coronary artery  2017    HLD (hyperlipidemia)    Pulmonary HTN    Type 2 diabetes mellitus without complication, with long-term current use of insulin    Coronary artery disease involving native coronary artery of native heart without angina pectoris    Smoker    Gastroesophageal reflux disease, esophagitis presence not specified    Oxygen dependent    DM (diabetes mellitus)    CAD (coronary artery disease)    GERD (gastroesophageal reflux disease)    Insomnia    CHF (congestive heart failure)    HTN (hypertension)    Mood disorder    No significant past surgical history          ABG - ( 20 Mar 2022 03:42 )  pH, Arterial: 7.35  pH, Blood: x     /  pCO2: 77    /  pO2: 89    / HCO3: 42    / Base Excess: 13.2  /  SaO2: 97                    03-19 @ 07:01  -  03-20 @ 07:00  --------------------------------------------------------  IN: 300 mL / OUT: 100 mL / NET: 200 mL            PHYSICAL EXAM:        LABS:  CBC Full  -  ( 20 Mar 2022 07:43 )  WBC Count : 9.99 K/uL  RBC Count : 4.50 M/uL  Hemoglobin : 12.7 g/dL  Hematocrit : 41.2 %  Platelet Count - Automated : 226 K/uL  Mean Cell Volume : 91.6 fl  Mean Cell Hemoglobin : 28.2 pg  Mean Cell Hemoglobin Concentration : 30.8 gm/dL  Auto Neutrophil # : x  Auto Lymphocyte # : x  Auto Monocyte # : x  Auto Eosinophil # : x  Auto Basophil # : x  Auto Neutrophil % : x  Auto Lymphocyte % : x  Auto Monocyte % : x  Auto Eosinophil % : x  Auto Basophil % : x    03-20    135  |  94<L>  |  22<H>  ----------------------------<  333<H>  4.5   |  39<H>  |  0.67    Ca    8.9      20 Mar 2022 07:43  Phos  3.9     03-20  Mg     2.2     03-20    TPro  6.3  /  Alb  3.2<L>  /  TBili  0.4  /  DBili  x   /  AST  9<L>  /  ALT  50  /  AlkPhos  59  03-19              [  ]  DVT Prophylaxis  [  ]   Abnormal Nutritional Status -  Malnutrition   Cachexia      Morbid Obesity BMI >/=40  Diet, Easy to Chew:   Consistent Carbohydrate Evening Snacks  DASH/TLC Sodium & Cholesterol Restricted  Mildly Thick Liquids (MILDTHICKLIQS) (03-19-22 @ 10:56) [Active]        RADIOLOGY & ADDITIONAL STUDIES:  ***          Goals of Care Discussion with Family/Proxy/Other   - see note from/family meeting set up for...     DEVIKA CARBALLO    SCU progress note    INTERVAL HPI/OVERNIGHT EVENTS: ***ICU downgrade to SCU on 3/19.  Pt seen and examined. Pt awake/alert. Pt endorses breathing better, wore BIPAP overnight, OOB /ambulates at bedside without problems. Pt denies chest discomfort, N/V/abd discomfort.     DNR [ ]   DNI  [  ] Full code     Covid - 19 PCR: SARS-CoV-2: NotDetec (18 Mar 2022 11:07)  COVID-19 PCR: NotDetec (14 Mar 2022 11:17)      The 4Ms    What Matters Most: see GOC  Age appropriate Medications/Screen for High Risk Medication: Yes  Mentation: see CAM below  Mobility: defer to physical exam    The Confusion Assessment Method (CAM) Diagnostic Algorithm     1: Acute Onset or Fluctuating Course  - Is there evidence of an acute change in mental status from the patient’s baseline? Did the (abnormal) behavior  fluctuate during the day, that is, tend to come and go, or increase and decrease in severity?  [ ] YES [x ] NO     2: Inattention  - Did the patient have difficulty focusing attention, being easily distractible, or having difficulty keeping track of what was being said?  [ ] YES [x ] NO     3: Disorganized thinking  -Was the patient’s thinking disorganized or incoherent, such as rambling or irrelevant conversation, unclear or illogical flow of ideas, or unpredictable switching from subject to subject?  [ ] YES [x ] NO    4: Altered Level of consciousness?  [ ] YES [x ] NO    The diagnosis of delirium by CAM requires the presence of features 1 and 2 and either 3 or 4.    PRESSORS: [ ] YES [x ] NO  azithromycin  IVPB 500 milliGRAM(s) IV Intermittent every 24 hours  azithromycin  IVPB        Cardiovascular:  CHF   Echo < from: Transthoracic Echocardiogram (03.10.22 @ 07:08) >  CONCLUSIONS:  1. Normal mitral valve. No mitral regurgitation is noted.  2. Calcified trileaflet aortic valve with normal opening.  No aortic stenosis. No aortic valve regurgitation seen.  3. Normal aortic root.  4. Normal left atrium.  5. Mild septal left ventricular hypertrophy.  6. Hyperdynamic left ventricular systolic function (EF  >70%).  7. Normal right atrium.  8. Right ventricular enlargement with normal RV systolic  function (TAPSE 2.4 cm).  9. RV systolic pressure is moderately increased at  53 mm  Hg.  10. Normal tricuspid valve. Trace tricuspid regurgitation.  11. Pulmonic valve not well seen. Trace pulmonic  insufficiency is noted.  12. Trivial pericardial effusion is seen.      < end of copied text >      Pulmonary:  ALBUTerol    90 MICROgram(s) HFA Inhaler 2 Puff(s) Inhalation every 6 hours  budesonide 160 MICROgram(s)/formoterol 4.5 MICROgram(s) Inhaler 2 Puff(s) Inhalation two times a day  ipratropium 17 MICROgram(s) HFA Inhaler 1 Puff(s) Inhalation every 6 hours    Hematalogic:  aspirin enteric coated 81 milliGRAM(s) Oral daily  heparin   Injectable 5000 Unit(s) SubCutaneous every 8 hours    Other:  acetaminophen     Tablet .. 650 milliGRAM(s) Oral every 6 hours PRN  ALPRAZolam 0.5 milliGRAM(s) Oral two times a day  gabapentin 100 milliGRAM(s) Oral three times a day  insulin glargine Injectable (LANTUS) 10 Unit(s) SubCutaneous every morning  insulin lispro (ADMELOG) corrective regimen sliding scale   SubCutaneous three times a day before meals  insulin lispro (ADMELOG) corrective regimen sliding scale   SubCutaneous at bedtime  latanoprost 0.005% Ophthalmic Solution 1 Drop(s) Both EYES at bedtime  methylPREDNISolone sodium succinate Injectable 40 milliGRAM(s) IV Push every 8 hours  nicotine -  14 mG/24Hr(s) Patch 1 patch Transdermal daily  oxycodone    5 mG/acetaminophen 325 mG 1 Tablet(s) Oral every 6 hours PRN  pantoprazole    Tablet 40 milliGRAM(s) Oral before breakfast  senna 2 Tablet(s) Oral at bedtime  simethicone 80 milliGRAM(s) Chew four times a day  traZODone 150 milliGRAM(s) Oral at bedtime    acetaminophen     Tablet .. 650 milliGRAM(s) Oral every 6 hours PRN  ALBUTerol    90 MICROgram(s) HFA Inhaler 2 Puff(s) Inhalation every 6 hours  ALPRAZolam 0.5 milliGRAM(s) Oral two times a day  aspirin enteric coated 81 milliGRAM(s) Oral daily  azithromycin  IVPB 500 milliGRAM(s) IV Intermittent every 24 hours  azithromycin  IVPB      budesonide 160 MICROgram(s)/formoterol 4.5 MICROgram(s) Inhaler 2 Puff(s) Inhalation two times a day  gabapentin 100 milliGRAM(s) Oral three times a day  heparin   Injectable 5000 Unit(s) SubCutaneous every 8 hours  insulin glargine Injectable (LANTUS) 10 Unit(s) SubCutaneous every morning  insulin lispro (ADMELOG) corrective regimen sliding scale   SubCutaneous three times a day before meals  insulin lispro (ADMELOG) corrective regimen sliding scale   SubCutaneous at bedtime  ipratropium 17 MICROgram(s) HFA Inhaler 1 Puff(s) Inhalation every 6 hours  latanoprost 0.005% Ophthalmic Solution 1 Drop(s) Both EYES at bedtime  methylPREDNISolone sodium succinate Injectable 40 milliGRAM(s) IV Push every 8 hours  nicotine -  14 mG/24Hr(s) Patch 1 patch Transdermal daily  oxycodone    5 mG/acetaminophen 325 mG 1 Tablet(s) Oral every 6 hours PRN  pantoprazole    Tablet 40 milliGRAM(s) Oral before breakfast  senna 2 Tablet(s) Oral at bedtime  simethicone 80 milliGRAM(s) Chew four times a day  traZODone 150 milliGRAM(s) Oral at bedtime    Drug Dosing Weight  Height (cm): 172.7 (18 Mar 2022 10:31)  Weight (kg): 90.7 (18 Mar 2022 10:31)  BMI (kg/m2): 30.4 (18 Mar 2022 10:31)  BSA (m2): 2.04 (18 Mar 2022 10:31)    CENTRAL LINE: [ ] YES [x ] NO  LOCATION:   DATE INSERTED:  REMOVE: [ ] YES [ ] NO  EXPLAIN:    AMOR: [ ] YES [x ] NO    DATE INSERTED:  REMOVE:  [ ] YES [ ] NO  EXPLAIN:    PAST MEDICAL & SURGICAL HISTORY:  COPD (chronic obstructive pulmonary disease)  Oxygen 2-3L at home    Stented coronary artery  2017    HLD (hyperlipidemia)    Pulmonary HTN    Type 2 diabetes mellitus without complication, with long-term current use of insulin    Coronary artery disease involving native coronary artery of native heart without angina pectoris    Smoker    Gastroesophageal reflux disease, esophagitis presence not specified    Oxygen dependent    DM (diabetes mellitus)    CAD (coronary artery disease)    GERD (gastroesophageal reflux disease)    Insomnia    CHF (congestive heart failure)    HTN (hypertension)    Mood disorder    No significant past surgical history          ABG - ( 20 Mar 2022 03:42 )  pH, Arterial: 7.35  pH, Blood: x     /  pCO2: 77    /  pO2: 89    / HCO3: 42    / Base Excess: 13.2  /  SaO2: 97          03-19 @ 07:01  -  03-20 @ 07:00  --------------------------------------------------------  IN: 300 mL / OUT: 100 mL / NET: 200 mL    PHYSICAL EXAM:  Gen: NAD  HEENT: PERRL, anicteric, no oral mucositis.   Resp: Unlabored.  Clear  breath sounds upper airway. diminished to bilat bases on auscultation. No rales, no wheezing  CVS: S1S2 present, regular  GI: BS(+), Soft, ND, NT  Extremities : BLE No edema or calf tenderness. PPP  Neuro: Awake/alert.  no new neuro deficits  Skin: warm,dry. Purpura areas to bilat upper extremities.       LABS:  CBC Full  -  ( 20 Mar 2022 07:43 )  WBC Count : 9.99 K/uL  RBC Count : 4.50 M/uL  Hemoglobin : 12.7 g/dL  Hematocrit : 41.2 %  Platelet Count - Automated : 226 K/uL  Mean Cell Volume : 91.6 fl  Mean Cell Hemoglobin : 28.2 pg  Mean Cell Hemoglobin Concentration : 30.8 gm/dL  Auto Neutrophil # : x  Auto Lymphocyte # : x  Auto Monocyte # : x  Auto Eosinophil # : x  Auto Basophil # : x  Auto Neutrophil % : x  Auto Lymphocyte % : x  Auto Monocyte % : x  Auto Eosinophil % : x  Auto Basophil % : x    03-20    135  |  94<L>  |  22<H>  ----------------------------<  333<H>  4.5   |  39<H>  |  0.67    Ca    8.9      20 Mar 2022 07:43  Phos  3.9     03-20  Mg     2.2     03-20    TPro  6.3  /  Alb  3.2<L>  /  TBili  0.4  /  DBili  x   /  AST  9<L>  /  ALT  50  /  AlkPhos  59  03-19      [  ]  DVT Prophylaxis  [  ]   Abnormal Nutritional Status -  Malnutrition      Morbid Obesity BMI >/=40  Diet, Easy to Chew: Consistent Carbohydrate Evening Snacks  DASH/TLC Sodium & Cholesterol Restricted  Mildly Thick Liquids (MILDTHICKLIQS) (03-19-22 @ 10:56) [Active]        RADIOLOGY & ADDITIONAL STUDIES:  ***    < from: Xray Chest 1 View- PORTABLE-Urgent (03.18.22 @ 12:21) >  INTERPRETATION:  AP chest on March 18, 2022 at 12:19 PM. Patient is   hypoxic.    Heart magnified by technique.    Prominent pulmonary arteries suggest pulmonary hypertension.    Engorged vessels in the lower lung field suggesting CHF. On March 11   there was also an NG tube no longer evident.    Chest is otherwise similar to March 11.    IMPRESSION: Prominent pulmonary arteries and lower lung field CHF again   noted.    < end of copied text >      Goals of Care Discussion with Family/Proxy/Other   - see note from

## 2022-03-20 NOTE — PROGRESS NOTE ADULT - PROBLEM SELECTOR PLAN 6
A1C 7.4  No uncontrolled hyperglycemia due to steroids.   Continue basal Lantus 10 units qam.  Add premeal Admelog 3 units TID .  Monitor BG ac/HS and cover per corrective scale.   Goal 140-180 A1C 7.4  No uncontrolled hyperglycemia due to steroids.   Increase  basal Lantus to 12 units qam.  Add premeal Admelog 3 units TID .  Monitor BG ac/HS and cover per corrective scale.   Goal 140-180

## 2022-03-20 NOTE — PHYSICAL THERAPY INITIAL EVALUATION ADULT - ADDITIONAL COMMENTS
Patient report he is a resident of [facility].  Ambulated Independently with rollator.

## 2022-03-20 NOTE — PROGRESS NOTE ADULT - SUBJECTIVE AND OBJECTIVE BOX
`Patient is a 61y old  Male who presents with a chief complaint of acute hypoxic hypercapnic respiratory failure (20 Mar 2022 09:10)    PATIENT IS SEEN AND EXAMINED IN MEDICAL FLOOR.  EMIT [    ]    MT [   ]      GT [   ]    ALLERGIES:  No Known Allergies      Daily     Daily     VITALS:    Vital Signs Last 24 Hrs  T(C): 36.4 (20 Mar 2022 12:42), Max: 36.7 (19 Mar 2022 14:35)  T(F): 97.6 (20 Mar 2022 12:42), Max: 98.1 (20 Mar 2022 05:02)  HR: 81 (20 Mar 2022 12:42) (81 - 105)  BP: 108/60 (20 Mar 2022 12:42) (108/60 - 131/77)  BP(mean): --  RR: 18 (20 Mar 2022 12:42) (18 - 20)  SpO2: 94% (20 Mar 2022 12:42) (93% - 96%)    LABS:    CBC Full  -  ( 20 Mar 2022 07:43 )  WBC Count : 9.99 K/uL  RBC Count : 4.50 M/uL  Hemoglobin : 12.7 g/dL  Hematocrit : 41.2 %  Platelet Count - Automated : 226 K/uL  Mean Cell Volume : 91.6 fl  Mean Cell Hemoglobin : 28.2 pg  Mean Cell Hemoglobin Concentration : 30.8 gm/dL  Auto Neutrophil # : x  Auto Lymphocyte # : x  Auto Monocyte # : x  Auto Eosinophil # : x  Auto Basophil # : x  Auto Neutrophil % : x  Auto Lymphocyte % : x  Auto Monocyte % : x  Auto Eosinophil % : x  Auto Basophil % : x      03-20    135  |  94<L>  |  22<H>  ----------------------------<  333<H>  4.5   |  39<H>  |  0.67    Ca    8.9      20 Mar 2022 07:43  Phos  3.9     03-20  Mg     2.2     03-20    TPro  6.3  /  Alb  3.2<L>  /  TBili  0.4  /  DBili  x   /  AST  9<L>  /  ALT  50  /  AlkPhos  59  03-19    CAPILLARY BLOOD GLUCOSE      POCT Blood Glucose.: 300 mg/dL (20 Mar 2022 11:16)  POCT Blood Glucose.: 300 mg/dL (20 Mar 2022 07:49)  POCT Blood Glucose.: 350 mg/dL (19 Mar 2022 20:38)  POCT Blood Glucose.: 385 mg/dL (19 Mar 2022 16:44)        LIVER FUNCTIONS - ( 19 Mar 2022 04:08 )  Alb: 3.2 g/dL / Pro: 6.3 g/dL / ALK PHOS: 59 U/L / ALT: 50 U/L DA / AST: 9 U/L / GGT: x           Creatinine Trend: 0.67<--, 0.48<--, 0.58<--, 0.44<--, 0.49<--, 0.70<--  I&O's Summary    19 Mar 2022 07:01  -  20 Mar 2022 07:00  --------------------------------------------------------  IN: 300 mL / OUT: 100 mL / NET: 200 mL    20 Mar 2022 07:01  -  20 Mar 2022 13:16  --------------------------------------------------------  IN: 0 mL / OUT: 800 mL / NET: -800 mL        ABG - ( 20 Mar 2022 03:42 )  pH, Arterial: 7.35  pH, Blood: x     /  pCO2: 77    /  pO2: 89    / HCO3: 42    / Base Excess: 13.2  /  SaO2: 97                      MEDICATIONS:    MEDICATIONS  (STANDING):  ALBUTerol    90 MICROgram(s) HFA Inhaler 2 Puff(s) Inhalation every 6 hours  ALPRAZolam 0.5 milliGRAM(s) Oral two times a day  aspirin enteric coated 81 milliGRAM(s) Oral daily  atorvastatin 40 milliGRAM(s) Oral at bedtime  azithromycin  IVPB 500 milliGRAM(s) IV Intermittent every 24 hours  azithromycin  IVPB      budesonide 160 MICROgram(s)/formoterol 4.5 MICROgram(s) Inhaler 2 Puff(s) Inhalation two times a day  gabapentin 100 milliGRAM(s) Oral three times a day  heparin   Injectable 5000 Unit(s) SubCutaneous every 8 hours  insulin glargine Injectable (LANTUS) 12 Unit(s) SubCutaneous every morning  insulin lispro (ADMELOG) corrective regimen sliding scale   SubCutaneous three times a day before meals  insulin lispro (ADMELOG) corrective regimen sliding scale   SubCutaneous at bedtime  insulin lispro Injectable (ADMELOG) 3 Unit(s) SubCutaneous three times a day before meals  ipratropium 17 MICROgram(s) HFA Inhaler 1 Puff(s) Inhalation every 6 hours  latanoprost 0.005% Ophthalmic Solution 1 Drop(s) Both EYES at bedtime  nicotine -  14 mG/24Hr(s) Patch 1 patch Transdermal daily  pantoprazole    Tablet 40 milliGRAM(s) Oral before breakfast  predniSONE   Tablet 40 milliGRAM(s) Oral daily  predniSONE   Tablet   Oral   senna 2 Tablet(s) Oral at bedtime  simethicone 80 milliGRAM(s) Chew four times a day  traZODone 150 milliGRAM(s) Oral at bedtime      MEDICATIONS  (PRN):  acetaminophen     Tablet .. 650 milliGRAM(s) Oral every 6 hours PRN Mild Pain (1 - 3), Moderate Pain (4 - 6)  oxycodone    5 mG/acetaminophen 325 mG 1 Tablet(s) Oral every 6 hours PRN Severe Pain (7 - 10)      REVIEW OF SYSTEMS:                           ALL ROS DONE [ X   ]    CONSTITUTIONAL:  LETHARGIC [   ], FEVER [   ], UNRESPONSIVE [   ]  CVS:  CP  [   ], SOB, [   ], PALPITATIONS [   ], DIZZYNESS [   ]  RS: COUGH [   ], SPUTUM [   ]  GI: ABDOMINAL PAIN [   ], NAUSEA [   ], VOMITINGS [   ], DIARRHEA [   ], CONSTIPATION [   ]  :  DYSURIA [   ], NOCTURIA [   ], INCREASED FREQUENCY [   ], DRIBLING [   ],  SKELETAL: PAINFUL JOINTS [   ], SWOLLEN JOINTS [   ], NECK ACHE [   ], LOW BACK ACHE [   ],  SKIN : ULCERS [   ], RASH [   ], ITCHING [   ]  CNS: HEAD ACHE [   ], DOUBLE VISION [   ], BLURRED VISION [   ], AMS / CONFUSION [   ], SEIZURES [   ], WEAKNESS [   ],TINGLING / NUMBNESS [   ]    PHYSICAL EXAMINATION:  GENERAL APPEARANCE: NO DISTRESS  HEENT:  NO PALLOR, NO  JVD,  NO   NODES, NECK SUPPLE  CVS: S1 +, S2 +,   RS: AEEB,  OCCASIONAL  RALES +,   WHEEZING +  ABD: SOFT, NT, NO, BS +  EXT: NO PE  SKIN: WARM,   SKELETAL:  ROM ACCEPTABLE  CNS:  AAO X 3    RADIOLOGY :    ACC: [id number] EXAM:  XR CHEST PORTABLE URGENT 1V                          PROCEDURE DATE:  03/18/2022          INTERPRETATION:  AP chest on March 18, 2022 at 12:19 PM. Patient is   hypoxic.    Heart magnified by technique.    Prominent pulmonary arteries suggest pulmonary hypertension.    Engorged vessels in the lower lung field suggesting CHF. On March 11   there was also an NG tube no longer evident.    Chest is otherwise similar to March 11.    IMPRESSION: Prominent pulmonary arteries and lower lung field CHF again   noted.      ASSESSMENT :       PLAN:  HPI:   61 year old Male, from assisted living  and medical history of CAD, CHF on BIPAP at NH, COPD (intubated ,later extubated a week ago), peripheral neuropathy, GERD, HTN, HLD, obesity, polyarthritis, Pulmonary HTN,  was brought in by EMS for shortness of breath. Patient was discharged from hospital, he states he started smoking again at his assisted living, this morning he has so much work of breathing so was sent to ER for further assessment. Patient denied having any chest pain, palpitations fever, nausea vomiting diarrhea or any other complains.   (18 Mar 2022 15:21)      - COPD EXACERBATION, ACUTE ON CHRONIC HYPOXIC & HYPERCAPNOEIC RESPIRATORY FAILURE S/T NONCOMPLIANCE W/ TOBACCO CESSATION AND W/ BIPAP   - STARTED ON IV STEROIDS [TAPERING], SYMBICORT, SPIRIVA, AZITHROMYCIN, NEBS,  BiPAP, O2 SUPPLEMENTS  - S/P DIAMOX  - NICOTINE PATCH  - S/P CRITICAL CARE EVALUATION AND ICU ADMISSION    - UNCONTROLLED DM, GLUCOSE INTOLERANCE DUE TO STEROIDS  -  LANTUS, SSI + FS     - SMOKER, COUNSELLED TO QUIT SMOKING     - GI AND DVT PROPHYLAXIS     `Patient is a 61y old  Male who presents with a chief complaint of acute hypoxic hypercapnic respiratory failure (20 Mar 2022 09:10)    PATIENT IS SEEN AND EXAMINED IN MEDICAL FLOOR.  EMIT [    ]    MT [   ]      GT [   ]    ALLERGIES:  No Known Allergies      Daily     Daily     VITALS:    Vital Signs Last 24 Hrs  T(C): 36.4 (20 Mar 2022 12:42), Max: 36.7 (19 Mar 2022 14:35)  T(F): 97.6 (20 Mar 2022 12:42), Max: 98.1 (20 Mar 2022 05:02)  HR: 81 (20 Mar 2022 12:42) (81 - 105)  BP: 108/60 (20 Mar 2022 12:42) (108/60 - 131/77)  BP(mean): --  RR: 18 (20 Mar 2022 12:42) (18 - 20)  SpO2: 94% (20 Mar 2022 12:42) (93% - 96%)    LABS:    CBC Full  -  ( 20 Mar 2022 07:43 )  WBC Count : 9.99 K/uL  RBC Count : 4.50 M/uL  Hemoglobin : 12.7 g/dL  Hematocrit : 41.2 %  Platelet Count - Automated : 226 K/uL  Mean Cell Volume : 91.6 fl  Mean Cell Hemoglobin : 28.2 pg  Mean Cell Hemoglobin Concentration : 30.8 gm/dL  Auto Neutrophil # : x  Auto Lymphocyte # : x  Auto Monocyte # : x  Auto Eosinophil # : x  Auto Basophil # : x  Auto Neutrophil % : x  Auto Lymphocyte % : x  Auto Monocyte % : x  Auto Eosinophil % : x  Auto Basophil % : x      03-20    135  |  94<L>  |  22<H>  ----------------------------<  333<H>  4.5   |  39<H>  |  0.67    Ca    8.9      20 Mar 2022 07:43  Phos  3.9     03-20  Mg     2.2     03-20    TPro  6.3  /  Alb  3.2<L>  /  TBili  0.4  /  DBili  x   /  AST  9<L>  /  ALT  50  /  AlkPhos  59  03-19    CAPILLARY BLOOD GLUCOSE      POCT Blood Glucose.: 300 mg/dL (20 Mar 2022 11:16)  POCT Blood Glucose.: 300 mg/dL (20 Mar 2022 07:49)  POCT Blood Glucose.: 350 mg/dL (19 Mar 2022 20:38)  POCT Blood Glucose.: 385 mg/dL (19 Mar 2022 16:44)        LIVER FUNCTIONS - ( 19 Mar 2022 04:08 )  Alb: 3.2 g/dL / Pro: 6.3 g/dL / ALK PHOS: 59 U/L / ALT: 50 U/L DA / AST: 9 U/L / GGT: x           Creatinine Trend: 0.67<--, 0.48<--, 0.58<--, 0.44<--, 0.49<--, 0.70<--  I&O's Summary    19 Mar 2022 07:01  -  20 Mar 2022 07:00  --------------------------------------------------------  IN: 300 mL / OUT: 100 mL / NET: 200 mL    20 Mar 2022 07:01  -  20 Mar 2022 13:16  --------------------------------------------------------  IN: 0 mL / OUT: 800 mL / NET: -800 mL        ABG - ( 20 Mar 2022 03:42 )  pH, Arterial: 7.35  pH, Blood: x     /  pCO2: 77    /  pO2: 89    / HCO3: 42    / Base Excess: 13.2  /  SaO2: 97                      MEDICATIONS:    MEDICATIONS  (STANDING):  ALBUTerol    90 MICROgram(s) HFA Inhaler 2 Puff(s) Inhalation every 6 hours  ALPRAZolam 0.5 milliGRAM(s) Oral two times a day  aspirin enteric coated 81 milliGRAM(s) Oral daily  atorvastatin 40 milliGRAM(s) Oral at bedtime  azithromycin  IVPB 500 milliGRAM(s) IV Intermittent every 24 hours  azithromycin  IVPB      budesonide 160 MICROgram(s)/formoterol 4.5 MICROgram(s) Inhaler 2 Puff(s) Inhalation two times a day  gabapentin 100 milliGRAM(s) Oral three times a day  heparin   Injectable 5000 Unit(s) SubCutaneous every 8 hours  insulin glargine Injectable (LANTUS) 12 Unit(s) SubCutaneous every morning  insulin lispro (ADMELOG) corrective regimen sliding scale   SubCutaneous three times a day before meals  insulin lispro (ADMELOG) corrective regimen sliding scale   SubCutaneous at bedtime  insulin lispro Injectable (ADMELOG) 3 Unit(s) SubCutaneous three times a day before meals  ipratropium 17 MICROgram(s) HFA Inhaler 1 Puff(s) Inhalation every 6 hours  latanoprost 0.005% Ophthalmic Solution 1 Drop(s) Both EYES at bedtime  nicotine -  14 mG/24Hr(s) Patch 1 patch Transdermal daily  pantoprazole    Tablet 40 milliGRAM(s) Oral before breakfast  predniSONE   Tablet 40 milliGRAM(s) Oral daily  predniSONE   Tablet   Oral   senna 2 Tablet(s) Oral at bedtime  simethicone 80 milliGRAM(s) Chew four times a day  traZODone 150 milliGRAM(s) Oral at bedtime      MEDICATIONS  (PRN):  acetaminophen     Tablet .. 650 milliGRAM(s) Oral every 6 hours PRN Mild Pain (1 - 3), Moderate Pain (4 - 6)  oxycodone    5 mG/acetaminophen 325 mG 1 Tablet(s) Oral every 6 hours PRN Severe Pain (7 - 10)      REVIEW OF SYSTEMS:                           ALL ROS DONE [ X   ]    CONSTITUTIONAL:  LETHARGIC [   ], FEVER [   ], UNRESPONSIVE [   ]  CVS:  CP  [   ], SOB, [   ], PALPITATIONS [   ], DIZZYNESS [   ]  RS: COUGH [   ], SPUTUM [   ]  GI: ABDOMINAL PAIN [   ], NAUSEA [   ], VOMITINGS [   ], DIARRHEA [   ], CONSTIPATION [   ]  :  DYSURIA [   ], NOCTURIA [   ], INCREASED FREQUENCY [   ], DRIBLING [   ],  SKELETAL: PAINFUL JOINTS [   ], SWOLLEN JOINTS [   ], NECK ACHE [   ], LOW BACK ACHE [   ],  SKIN : ULCERS [   ], RASH [   ], ITCHING [   ]  CNS: HEAD ACHE [   ], DOUBLE VISION [   ], BLURRED VISION [   ], AMS / CONFUSION [   ], SEIZURES [   ], WEAKNESS [   ],TINGLING / NUMBNESS [   ]    PHYSICAL EXAMINATION:  GENERAL APPEARANCE: NO DISTRESS  HEENT:  NO PALLOR, NO  JVD,  NO   NODES, NECK SUPPLE  CVS: S1 +, S2 +,   RS: AEEB,  OCCASIONAL  RALES +,  WHEEZING+  ABD: SOFT, NT, NO, BS +  EXT: NO PE  SKIN: WARM,   SKELETAL:  ROM ACCEPTABLE  CNS:  AAO X 3    RADIOLOGY :    ACC: [id number] EXAM:  XR CHEST PORTABLE URGENT 1V                          PROCEDURE DATE:  03/18/2022          INTERPRETATION:  AP chest on March 18, 2022 at 12:19 PM. Patient is   hypoxic.    Heart magnified by technique.    Prominent pulmonary arteries suggest pulmonary hypertension.    Engorged vessels in the lower lung field suggesting CHF. On March 11   there was also an NG tube no longer evident.    Chest is otherwise similar to March 11.    IMPRESSION: Prominent pulmonary arteries and lower lung field CHF again   noted.      ASSESSMENT :       PLAN:  HPI:   61 year old Male, from assisted living  and medical history of CAD, CHF on BIPAP at NH, COPD (intubated ,later extubated a week ago), peripheral neuropathy, GERD, HTN, HLD, obesity, polyarthritis, Pulmonary HTN,  was brought in by EMS for shortness of breath. Patient was discharged from hospital, he states he started smoking again at his assisted living, this morning he has so much work of breathing so was sent to ER for further assessment. Patient denied having any chest pain, palpitations fever, nausea vomiting diarrhea or any other complains.   (18 Mar 2022 15:21)      - COPD EXACERBATION, ACUTE ON CHRONIC HYPOXIC & HYPERCAPNOEIC RESPIRATORY FAILURE S/T NONCOMPLIANCE W/ TOBACCO CESSATION AND W/ BIPAP   - STARTED ON IV STEROIDS [TAPERING], SYMBICORT, SPIRIVA, AZITHROMYCIN, NEBS,  BiPAP, O2 SUPPLEMENTS  - S/P DIAMOX  - NICOTINE PATCH  - S/P CRITICAL CARE EVALUATION AND ICU ADMISSION    - UNCONTROLLED DM, GLUCOSE INTOLERANCE DUE TO STEROIDS  -  LANTUS, SSI + FS     - SMOKER, COUNSELLED TO QUIT SMOKING     - GI AND DVT PROPHYLAXIS

## 2022-03-20 NOTE — PROGRESS NOTE ADULT - ASSESSMENT
61 year old man from [facility], active smoker, with a PMHx of CAD, CHF on nocturnal BIPAP, COPD (intubation in past), peripheral neuropathy, GERD, HTN, HLD, obesity, polyarthritis, Pulmonary HTN, TIA, Vit D Def, and [facility] (3/9 - 15), who [facility] for acute on chronic respiratory failure with hypoxia and hypercapnia.  Patient reports that while after this past discharge he began smoking again and was not adherent with his BiPAP, prompting the re-admission. Admitted from ED to ICU for new BiPAP and close monitoring due to concern for re-intubation. In ICU was found to have hypercapnia and elevated serum bicarb, mild improvement after resuming nocturnal BiPAP. Re-started on Solumedrol 40q8h, continued on Symbicort, Spiriva, and albuterol.   Pt improved and downgraded to SCU 3/19.

## 2022-03-20 NOTE — PROGRESS NOTE ADULT - PROBLEM SELECTOR PLAN 1
Improving . PCO2 downtrending   Continue BIPAP qHS and prn.   Continue supplemental O2 2L NC for SpO2 < 89%  Continue steroid, change to Prednisone with taper x 5 days.   Continue inhalers: Albuterol, tiotropium, budesonide    Continue Azithromycin for possible  PNA as pt p/w leukocytosis.   Monitor oxygenation  Smoke cessation Improving . PCO2 downtrending   Continue BIPAP qHS and prn.   Continue supplemental O2 2L NC for SpO2 < 89%  Continue steroid, change to Prednisone with taper    Continue inhalers: Albuterol, tiotropium, budesonide    Continue Azithromycin for possible  PNA as pt p/w leukocytosis.   Monitor oxygenation  Smoke cessation

## 2022-03-21 ENCOUNTER — TRANSCRIPTION ENCOUNTER (OUTPATIENT)
Age: 62
End: 2022-03-21

## 2022-03-21 VITALS
RESPIRATION RATE: 17 BRPM | HEART RATE: 94 BPM | SYSTOLIC BLOOD PRESSURE: 132 MMHG | OXYGEN SATURATION: 99 % | TEMPERATURE: 98 F | DIASTOLIC BLOOD PRESSURE: 61 MMHG

## 2022-03-21 LAB
ANION GAP SERPL CALC-SCNC: 2 MMOL/L — LOW (ref 5–17)
BUN SERPL-MCNC: 26 MG/DL — HIGH (ref 7–18)
CALCIUM SERPL-MCNC: 9.1 MG/DL — SIGNIFICANT CHANGE UP (ref 8.4–10.5)
CHLORIDE SERPL-SCNC: 98 MMOL/L — SIGNIFICANT CHANGE UP (ref 96–108)
CO2 SERPL-SCNC: 38 MMOL/L — HIGH (ref 22–31)
CREAT SERPL-MCNC: 0.73 MG/DL — SIGNIFICANT CHANGE UP (ref 0.5–1.3)
EGFR: 105 ML/MIN/1.73M2 — SIGNIFICANT CHANGE UP
GLUCOSE SERPL-MCNC: 223 MG/DL — HIGH (ref 70–99)
HCT VFR BLD CALC: 44.1 % — SIGNIFICANT CHANGE UP (ref 39–50)
HGB BLD-MCNC: 13.8 G/DL — SIGNIFICANT CHANGE UP (ref 13–17)
MAGNESIUM SERPL-MCNC: 2.2 MG/DL — SIGNIFICANT CHANGE UP (ref 1.6–2.6)
MCHC RBC-ENTMCNC: 28.9 PG — SIGNIFICANT CHANGE UP (ref 27–34)
MCHC RBC-ENTMCNC: 31.3 GM/DL — LOW (ref 32–36)
MCV RBC AUTO: 92.3 FL — SIGNIFICANT CHANGE UP (ref 80–100)
NRBC # BLD: 0 /100 WBCS — SIGNIFICANT CHANGE UP (ref 0–0)
PHOSPHATE SERPL-MCNC: 3.5 MG/DL — SIGNIFICANT CHANGE UP (ref 2.5–4.5)
PLATELET # BLD AUTO: 213 K/UL — SIGNIFICANT CHANGE UP (ref 150–400)
POTASSIUM SERPL-MCNC: 4.2 MMOL/L — SIGNIFICANT CHANGE UP (ref 3.5–5.3)
POTASSIUM SERPL-SCNC: 4.2 MMOL/L — SIGNIFICANT CHANGE UP (ref 3.5–5.3)
RBC # BLD: 4.78 M/UL — SIGNIFICANT CHANGE UP (ref 4.2–5.8)
RBC # FLD: 13.2 % — SIGNIFICANT CHANGE UP (ref 10.3–14.5)
SODIUM SERPL-SCNC: 138 MMOL/L — SIGNIFICANT CHANGE UP (ref 135–145)
WBC # BLD: 9.55 K/UL — SIGNIFICANT CHANGE UP (ref 3.8–10.5)
WBC # FLD AUTO: 9.55 K/UL — SIGNIFICANT CHANGE UP (ref 3.8–10.5)

## 2022-03-21 PROCEDURE — 0225U NFCT DS DNA&RNA 21 SARSCOV2: CPT

## 2022-03-21 PROCEDURE — 99285 EMERGENCY DEPT VISIT HI MDM: CPT

## 2022-03-21 PROCEDURE — 84295 ASSAY OF SERUM SODIUM: CPT

## 2022-03-21 PROCEDURE — 84132 ASSAY OF SERUM POTASSIUM: CPT

## 2022-03-21 PROCEDURE — 82962 GLUCOSE BLOOD TEST: CPT

## 2022-03-21 PROCEDURE — 80053 COMPREHEN METABOLIC PANEL: CPT

## 2022-03-21 PROCEDURE — 83735 ASSAY OF MAGNESIUM: CPT

## 2022-03-21 PROCEDURE — 36415 COLL VENOUS BLD VENIPUNCTURE: CPT

## 2022-03-21 PROCEDURE — 85025 COMPLETE CBC W/AUTO DIFF WBC: CPT

## 2022-03-21 PROCEDURE — 94660 CPAP INITIATION&MGMT: CPT

## 2022-03-21 PROCEDURE — 85027 COMPLETE CBC AUTOMATED: CPT

## 2022-03-21 PROCEDURE — 82330 ASSAY OF CALCIUM: CPT

## 2022-03-21 PROCEDURE — 71045 X-RAY EXAM CHEST 1 VIEW: CPT

## 2022-03-21 PROCEDURE — 84100 ASSAY OF PHOSPHORUS: CPT

## 2022-03-21 PROCEDURE — 97163 PT EVAL HIGH COMPLEX 45 MIN: CPT

## 2022-03-21 PROCEDURE — 82803 BLOOD GASES ANY COMBINATION: CPT

## 2022-03-21 PROCEDURE — 93005 ELECTROCARDIOGRAM TRACING: CPT

## 2022-03-21 PROCEDURE — 83880 ASSAY OF NATRIURETIC PEPTIDE: CPT

## 2022-03-21 PROCEDURE — 94640 AIRWAY INHALATION TREATMENT: CPT

## 2022-03-21 PROCEDURE — 83605 ASSAY OF LACTIC ACID: CPT

## 2022-03-21 PROCEDURE — 84484 ASSAY OF TROPONIN QUANT: CPT

## 2022-03-21 PROCEDURE — 80048 BASIC METABOLIC PNL TOTAL CA: CPT

## 2022-03-21 RX ORDER — PANTOPRAZOLE SODIUM 20 MG/1
1 TABLET, DELAYED RELEASE ORAL
Qty: 30 | Refills: 0
Start: 2022-03-21 | End: 2022-04-19

## 2022-03-21 RX ORDER — IPRATROPIUM BROMIDE 0.2 MG/ML
1 SOLUTION, NON-ORAL INHALATION
Qty: 0 | Refills: 0 | DISCHARGE
Start: 2022-03-21

## 2022-03-21 RX ORDER — ATORVASTATIN CALCIUM 80 MG/1
1 TABLET, FILM COATED ORAL
Qty: 0 | Refills: 0 | DISCHARGE
Start: 2022-03-21

## 2022-03-21 RX ORDER — INSULIN LISPRO 100/ML
5 VIAL (ML) SUBCUTANEOUS ONCE
Refills: 0 | Status: COMPLETED | OUTPATIENT
Start: 2022-03-21 | End: 2022-03-21

## 2022-03-21 RX ORDER — NICOTINE POLACRILEX 2 MG
1 GUM BUCCAL
Qty: 30 | Refills: 0
Start: 2022-03-21 | End: 2022-04-19

## 2022-03-21 RX ORDER — MULTIVIT-MIN/FERROUS GLUCONATE 9 MG/15 ML
0 LIQUID (ML) ORAL
Qty: 0 | Refills: 0 | DISCHARGE

## 2022-03-21 RX ORDER — NICOTINE POLACRILEX 2 MG
1 GUM BUCCAL
Qty: 0 | Refills: 0 | DISCHARGE
Start: 2022-03-21

## 2022-03-21 RX ORDER — SENNA PLUS 8.6 MG/1
2 TABLET ORAL
Qty: 0 | Refills: 0 | DISCHARGE
Start: 2022-03-21

## 2022-03-21 RX ORDER — INSULIN LISPRO 100/ML
3 VIAL (ML) SUBCUTANEOUS
Qty: 0 | Refills: 0 | DISCHARGE
Start: 2022-03-21

## 2022-03-21 RX ORDER — INSULIN LISPRO 100/ML
4 VIAL (ML) SUBCUTANEOUS
Qty: 0 | Refills: 0 | DISCHARGE
Start: 2022-03-21

## 2022-03-21 RX ORDER — ATORVASTATIN CALCIUM 80 MG/1
1 TABLET, FILM COATED ORAL
Qty: 30 | Refills: 0
Start: 2022-03-21 | End: 2022-04-19

## 2022-03-21 RX ORDER — MULTIVIT-MIN/FERROUS GLUCONATE 9 MG/15 ML
1 LIQUID (ML) ORAL
Qty: 0 | Refills: 0 | DISCHARGE
Start: 2022-03-21

## 2022-03-21 RX ORDER — PANTOPRAZOLE SODIUM 20 MG/1
1 TABLET, DELAYED RELEASE ORAL
Qty: 0 | Refills: 0 | DISCHARGE
Start: 2022-03-21

## 2022-03-21 RX ADMIN — BUDESONIDE AND FORMOTEROL FUMARATE DIHYDRATE 2 PUFF(S): 160; 4.5 AEROSOL RESPIRATORY (INHALATION) at 09:10

## 2022-03-21 RX ADMIN — ALBUTEROL 2 PUFF(S): 90 AEROSOL, METERED ORAL at 08:47

## 2022-03-21 RX ADMIN — Medication 1 PATCH: at 10:00

## 2022-03-21 RX ADMIN — Medication 1 PUFF(S): at 09:11

## 2022-03-21 RX ADMIN — Medication 40 MILLIGRAM(S): at 05:15

## 2022-03-21 RX ADMIN — Medication 5 UNIT(S): at 12:28

## 2022-03-21 RX ADMIN — GABAPENTIN 100 MILLIGRAM(S): 400 CAPSULE ORAL at 13:49

## 2022-03-21 RX ADMIN — Medication 1 PUFF(S): at 03:03

## 2022-03-21 RX ADMIN — GABAPENTIN 100 MILLIGRAM(S): 400 CAPSULE ORAL at 05:16

## 2022-03-21 RX ADMIN — HEPARIN SODIUM 5000 UNIT(S): 5000 INJECTION INTRAVENOUS; SUBCUTANEOUS at 05:17

## 2022-03-21 RX ADMIN — HEPARIN SODIUM 5000 UNIT(S): 5000 INJECTION INTRAVENOUS; SUBCUTANEOUS at 13:50

## 2022-03-21 RX ADMIN — SIMETHICONE 80 MILLIGRAM(S): 80 TABLET, CHEWABLE ORAL at 05:17

## 2022-03-21 RX ADMIN — PANTOPRAZOLE SODIUM 40 MILLIGRAM(S): 20 TABLET, DELAYED RELEASE ORAL at 06:10

## 2022-03-21 RX ADMIN — OXYCODONE AND ACETAMINOPHEN 1 TABLET(S): 5; 325 TABLET ORAL at 08:42

## 2022-03-21 RX ADMIN — AZITHROMYCIN 500 MILLIGRAM(S): 500 TABLET, FILM COATED ORAL at 11:24

## 2022-03-21 RX ADMIN — ALBUTEROL 2 PUFF(S): 90 AEROSOL, METERED ORAL at 03:03

## 2022-03-21 RX ADMIN — Medication 2: at 07:44

## 2022-03-21 RX ADMIN — Medication 3 UNIT(S): at 07:47

## 2022-03-21 RX ADMIN — Medication 1 PATCH: at 08:45

## 2022-03-21 RX ADMIN — SIMETHICONE 80 MILLIGRAM(S): 80 TABLET, CHEWABLE ORAL at 11:22

## 2022-03-21 RX ADMIN — Medication 6: at 11:34

## 2022-03-21 RX ADMIN — Medication 81 MILLIGRAM(S): at 11:28

## 2022-03-21 RX ADMIN — OXYCODONE AND ACETAMINOPHEN 1 TABLET(S): 5; 325 TABLET ORAL at 10:00

## 2022-03-21 RX ADMIN — Medication 0.5 MILLIGRAM(S): at 05:16

## 2022-03-21 RX ADMIN — INSULIN GLARGINE 12 UNIT(S): 100 INJECTION, SOLUTION SUBCUTANEOUS at 07:47

## 2022-03-21 RX ADMIN — Medication 3 UNIT(S): at 11:39

## 2022-03-21 NOTE — PROGRESS NOTE ADULT - PROBLEM SELECTOR PROBLEM 5
Coronary artery disease involving native coronary artery of native heart without angina pectoris
HTN (hypertension)

## 2022-03-21 NOTE — PROGRESS NOTE ADULT - PROBLEM SELECTOR PLAN 3
likely due to COPD and CHF.   Echo as above.   Continue BIPAP , supplemental oxygen as above  Continue bronchodilator  Monitor for fluid overload, daily weight.   Smoke cessation
Pt appears euvolemic.   Continue ASA  Continue  statin  Daily weight.

## 2022-03-21 NOTE — DISCHARGE NOTE NURSING/CASE MANAGEMENT/SOCIAL WORK - NSDCPEEMAIL_GEN_ALL_CORE
[facility] email [email or web address]

## 2022-03-21 NOTE — DISCHARGE NOTE NURSING/CASE MANAGEMENT/SOCIAL WORK - PATIENT PORTAL LINK FT
You can access the FollowMyHealth Patient Portal offered by [facility] by registering at the following website: [email or web address]. By joining [facility]’s FollowMyHealth portal, you will also be able to view your health information using other applications (apps) compatible with our system.

## 2022-03-21 NOTE — PROGRESS NOTE ADULT - PROBLEM SELECTOR PLAN 1
Resolved.   Continue BIPAP qHS and prn.   Continue supplemental O2 2L NC for SpO2 < 89%  Continue  Prednisone with taper    Continue inhalers: Albuterol, tiotropium, budesonide    Continue Azithromycin for 1 more day for  possible  PNA as pt p/w leukocytosis.   Monitor oxygenation  Smoke cessation.

## 2022-03-21 NOTE — PROGRESS NOTE ADULT - SUBJECTIVE AND OBJECTIVE BOX
`Patient is a 61y old  Male who presents with a chief complaint of acute hypoxic hypercapnic respiratory failure (20 Mar 2022 14:57)    PATIENT IS SEEN AND EXAMINED IN MEDICAL FLOOR.  EMIT [    ]    MT [   ]      GT [   ]    ALLERGIES:  No Known Allergies      Daily     Daily     VITALS:    Vital Signs Last 24 Hrs  T(C): 36.4 (21 Mar 2022 05:01), Max: 36.8 (20 Mar 2022 20:38)  T(F): 97.6 (21 Mar 2022 05:01), Max: 98.2 (20 Mar 2022 20:38)  HR: 92 (21 Mar 2022 05:01) (81 - 101)  BP: 131/78 (21 Mar 2022 05:01) (107/56 - 131/78)  BP(mean): --  RR: 18 (21 Mar 2022 05:01) (18 - 18)  SpO2: 99% (21 Mar 2022 05:01) (94% - 99%)    LABS:    CBC Full  -  ( 21 Mar 2022 06:25 )  WBC Count : 9.55 K/uL  RBC Count : 4.78 M/uL  Hemoglobin : 13.8 g/dL  Hematocrit : 44.1 %  Platelet Count - Automated : 213 K/uL  Mean Cell Volume : 92.3 fl  Mean Cell Hemoglobin : 28.9 pg  Mean Cell Hemoglobin Concentration : 31.3 gm/dL  Auto Neutrophil # : x  Auto Lymphocyte # : x  Auto Monocyte # : x  Auto Eosinophil # : x  Auto Basophil # : x  Auto Neutrophil % : x  Auto Lymphocyte % : x  Auto Monocyte % : x  Auto Eosinophil % : x  Auto Basophil % : x      03-21    138  |  98  |  26<H>  ----------------------------<  223<H>  4.2   |  38<H>  |  0.73    Ca    9.1      21 Mar 2022 06:25  Phos  3.5     03-21  Mg     2.2     03-21      CAPILLARY BLOOD GLUCOSE      POCT Blood Glucose.: 232 mg/dL (21 Mar 2022 07:34)  POCT Blood Glucose.: 414 mg/dL (20 Mar 2022 20:53)  POCT Blood Glucose.: 396 mg/dL (20 Mar 2022 16:51)  POCT Blood Glucose.: 300 mg/dL (20 Mar 2022 11:16)          Creatinine Trend: 0.73<--, 0.67<--, 0.48<--, 0.58<--, 0.44<--, 0.49<--  I&O's Summary    20 Mar 2022 07:01  -  21 Mar 2022 07:00  --------------------------------------------------------  IN: 0 mL / OUT: 800 mL / NET: -800 mL        ABG - ( 20 Mar 2022 03:42 )  pH, Arterial: 7.35  pH, Blood: x     /  pCO2: 77    /  pO2: 89    / HCO3: 42    / Base Excess: 13.2  /  SaO2: 97                      MEDICATIONS:    MEDICATIONS  (STANDING):  ALBUTerol    90 MICROgram(s) HFA Inhaler 2 Puff(s) Inhalation every 6 hours  ALPRAZolam 0.5 milliGRAM(s) Oral two times a day  aspirin enteric coated 81 milliGRAM(s) Oral daily  atorvastatin 40 milliGRAM(s) Oral at bedtime  azithromycin   Tablet 500 milliGRAM(s) Oral daily  budesonide 160 MICROgram(s)/formoterol 4.5 MICROgram(s) Inhaler 2 Puff(s) Inhalation two times a day  gabapentin 100 milliGRAM(s) Oral three times a day  heparin   Injectable 5000 Unit(s) SubCutaneous every 8 hours  insulin glargine Injectable (LANTUS) 12 Unit(s) SubCutaneous every morning  insulin lispro (ADMELOG) corrective regimen sliding scale   SubCutaneous three times a day before meals  insulin lispro (ADMELOG) corrective regimen sliding scale   SubCutaneous at bedtime  insulin lispro Injectable (ADMELOG) 3 Unit(s) SubCutaneous three times a day before meals  ipratropium 17 MICROgram(s) HFA Inhaler 1 Puff(s) Inhalation every 6 hours  latanoprost 0.005% Ophthalmic Solution 1 Drop(s) Both EYES at bedtime  nicotine -  14 mG/24Hr(s) Patch 1 patch Transdermal daily  pantoprazole    Tablet 40 milliGRAM(s) Oral before breakfast  predniSONE   Tablet 40 milliGRAM(s) Oral daily  predniSONE   Tablet   Oral   senna 2 Tablet(s) Oral at bedtime  simethicone 80 milliGRAM(s) Chew four times a day  traZODone 150 milliGRAM(s) Oral at bedtime      MEDICATIONS  (PRN):  acetaminophen     Tablet .. 650 milliGRAM(s) Oral every 6 hours PRN Mild Pain (1 - 3), Moderate Pain (4 - 6)  oxycodone    5 mG/acetaminophen 325 mG 1 Tablet(s) Oral every 6 hours PRN Severe Pain (7 - 10)      REVIEW OF SYSTEMS:                           ALL ROS DONE [ X   ]    CONSTITUTIONAL:  LETHARGIC [   ], FEVER [   ], UNRESPONSIVE [   ]  CVS:  CP  [   ], SOB, [   ], PALPITATIONS [   ], DIZZYNESS [   ]  RS: COUGH [   ], SPUTUM [   ]  GI: ABDOMINAL PAIN [   ], NAUSEA [   ], VOMITINGS [   ], DIARRHEA [   ], CONSTIPATION [   ]  :  DYSURIA [   ], NOCTURIA [   ], INCREASED FREQUENCY [   ], DRIBLING [   ],  SKELETAL: PAINFUL JOINTS [   ], SWOLLEN JOINTS [   ], NECK ACHE [   ], LOW BACK ACHE [   ],  SKIN : ULCERS [   ], RASH [   ], ITCHING [   ]  CNS: HEAD ACHE [   ], DOUBLE VISION [   ], BLURRED VISION [   ], AMS / CONFUSION [   ], SEIZURES [   ], WEAKNESS [   ],TINGLING / NUMBNESS [   ]      PHYSICAL EXAMINATION:  GENERAL APPEARANCE: NO DISTRESS  HEENT:  NO PALLOR, NO  JVD,  NO   NODES, NECK SUPPLE  CVS: S1 +, S2 +,   RS: AEEB,  OCCASIONAL  RALES +,  WHEEZING+  ABD: SOFT, NT, NO, BS +  EXT: NO PE  SKIN: WARM,   SKELETAL:  ROM ACCEPTABLE  CNS:  AAO X 3    RADIOLOGY :    ACC: [id number] EXAM:  XR CHEST PORTABLE URGENT 1V                          PROCEDURE DATE:  03/18/2022          INTERPRETATION:  AP chest on March 18, 2022 at 12:19 PM. Patient is   hypoxic.    Heart magnified by technique.    Prominent pulmonary arteries suggest pulmonary hypertension.    Engorged vessels in the lower lung field suggesting CHF. On March 11   there was also an NG tube no longer evident.    Chest is otherwise similar to March 11.    IMPRESSION: Prominent pulmonary arteries and lower lung field CHF again   noted.      ASSESSMENT :       PLAN:  HPI:   61 year old Male, from assisted living  and medical history of CAD, CHF on BIPAP at NH, COPD (intubated ,later extubated a week ago), peripheral neuropathy, GERD, HTN, HLD, obesity, polyarthritis, Pulmonary HTN,  was brought in by EMS for shortness of breath. Patient was discharged from hospital, he states he started smoking again at his assisted living, this morning he has so much work of breathing so was sent to ER for further assessment. Patient denied having any chest pain, palpitations fever, nausea vomiting diarrhea or any other complains.   (18 Mar 2022 15:21)      - COPD EXACERBATION, ACUTE ON CHRONIC HYPOXIC & HYPERCAPNOEIC RESPIRATORY FAILURE S/T NONCOMPLIANCE W/ TOBACCO CESSATION AND W/ BIPAP   - STARTED ON IV STEROIDS [TAPERING], SYMBICORT, SPIRIVA, AZITHROMYCIN, NEBS,  BiPAP, O2 SUPPLEMENTS  - S/P DIAMOX  - NICOTINE PATCH  - S/P CRITICAL CARE EVALUATION AND ICU ADMISSION    - UNCONTROLLED DM, GLUCOSE INTOLERANCE DUE TO STEROIDS  -  LANTUS, SSI + FS     - SMOKER, COUNSELLED TO QUIT SMOKING     - GI AND DVT PROPHYLAXIS   Patient is a 61y old  Male who presents with a chief complaint of acute hypoxic hypercapnic respiratory failure (20 Mar 2022 14:57)    PATIENT IS SEEN AND EXAMINED IN MEDICAL FLOOR.    ALLERGIES:  No Known Allergies      VITALS:    Vital Signs Last 24 Hrs  T(C): 36.4 (21 Mar 2022 05:01), Max: 36.8 (20 Mar 2022 20:38)  T(F): 97.6 (21 Mar 2022 05:01), Max: 98.2 (20 Mar 2022 20:38)  HR: 92 (21 Mar 2022 05:01) (81 - 101)  BP: 131/78 (21 Mar 2022 05:01) (107/56 - 131/78)  BP(mean): --  RR: 18 (21 Mar 2022 05:01) (18 - 18)  SpO2: 99% (21 Mar 2022 05:01) (94% - 99%)    LABS:    CBC Full  -  ( 21 Mar 2022 06:25 )  WBC Count : 9.55 K/uL  RBC Count : 4.78 M/uL  Hemoglobin : 13.8 g/dL  Hematocrit : 44.1 %  Platelet Count - Automated : 213 K/uL  Mean Cell Volume : 92.3 fl  Mean Cell Hemoglobin : 28.9 pg  Mean Cell Hemoglobin Concentration : 31.3 gm/dL  Auto Neutrophil # : x  Auto Lymphocyte # : x  Auto Monocyte # : x  Auto Eosinophil # : x  Auto Basophil # : x  Auto Neutrophil % : x  Auto Lymphocyte % : x  Auto Monocyte % : x  Auto Eosinophil % : x  Auto Basophil % : x      03-21    138  |  98  |  26<H>  ----------------------------<  223<H>  4.2   |  38<H>  |  0.73    Ca    9.1      21 Mar 2022 06:25  Phos  3.5     03-21  Mg     2.2     03-21      CAPILLARY BLOOD GLUCOSE      POCT Blood Glucose.: 232 mg/dL (21 Mar 2022 07:34)  POCT Blood Glucose.: 414 mg/dL (20 Mar 2022 20:53)  POCT Blood Glucose.: 396 mg/dL (20 Mar 2022 16:51)  POCT Blood Glucose.: 300 mg/dL (20 Mar 2022 11:16)          Creatinine Trend: 0.73<--, 0.67<--, 0.48<--, 0.58<--, 0.44<--, 0.49<--  I&O's Summary    20 Mar 2022 07:01  -  21 Mar 2022 07:00  --------------------------------------------------------  IN: 0 mL / OUT: 800 mL / NET: -800 mL        ABG - ( 20 Mar 2022 03:42 )  pH, Arterial: 7.35  pH, Blood: x     /  pCO2: 77    /  pO2: 89    / HCO3: 42    / Base Excess: 13.2  /  SaO2: 97                      MEDICATIONS:    MEDICATIONS  (STANDING):  ALBUTerol    90 MICROgram(s) HFA Inhaler 2 Puff(s) Inhalation every 6 hours  ALPRAZolam 0.5 milliGRAM(s) Oral two times a day  aspirin enteric coated 81 milliGRAM(s) Oral daily  atorvastatin 40 milliGRAM(s) Oral at bedtime  azithromycin   Tablet 500 milliGRAM(s) Oral daily  budesonide 160 MICROgram(s)/formoterol 4.5 MICROgram(s) Inhaler 2 Puff(s) Inhalation two times a day  gabapentin 100 milliGRAM(s) Oral three times a day  heparin   Injectable 5000 Unit(s) SubCutaneous every 8 hours  insulin glargine Injectable (LANTUS) 12 Unit(s) SubCutaneous every morning  insulin lispro (ADMELOG) corrective regimen sliding scale   SubCutaneous three times a day before meals  insulin lispro (ADMELOG) corrective regimen sliding scale   SubCutaneous at bedtime  insulin lispro Injectable (ADMELOG) 3 Unit(s) SubCutaneous three times a day before meals  ipratropium 17 MICROgram(s) HFA Inhaler 1 Puff(s) Inhalation every 6 hours  latanoprost 0.005% Ophthalmic Solution 1 Drop(s) Both EYES at bedtime  nicotine -  14 mG/24Hr(s) Patch 1 patch Transdermal daily  pantoprazole    Tablet 40 milliGRAM(s) Oral before breakfast  predniSONE   Tablet 40 milliGRAM(s) Oral daily  predniSONE   Tablet   Oral   senna 2 Tablet(s) Oral at bedtime  simethicone 80 milliGRAM(s) Chew four times a day  traZODone 150 milliGRAM(s) Oral at bedtime      MEDICATIONS  (PRN):  acetaminophen     Tablet .. 650 milliGRAM(s) Oral every 6 hours PRN Mild Pain (1 - 3), Moderate Pain (4 - 6)  oxycodone    5 mG/acetaminophen 325 mG 1 Tablet(s) Oral every 6 hours PRN Severe Pain (7 - 10)      REVIEW OF SYSTEMS:                           ALL ROS DONE [ X   ]    CONSTITUTIONAL:  LETHARGIC [   ], FEVER [   ], UNRESPONSIVE [   ]  CVS:  CP  [   ], SOB, [   ], PALPITATIONS [   ], DIZZYNESS [   ]  RS: COUGH [   ], SPUTUM [   ]  GI: ABDOMINAL PAIN [   ], NAUSEA [   ], VOMITINGS [   ], DIARRHEA [   ], CONSTIPATION [   ]  :  DYSURIA [   ], NOCTURIA [   ], INCREASED FREQUENCY [   ], DRIBLING [   ],  SKELETAL: PAINFUL JOINTS [   ], SWOLLEN JOINTS [   ], NECK ACHE [   ], LOW BACK ACHE [   ],  SKIN : ULCERS [   ], RASH [   ], ITCHING [   ]  CNS: HEAD ACHE [   ], DOUBLE VISION [   ], BLURRED VISION [   ], AMS / CONFUSION [   ], SEIZURES [   ], WEAKNESS [   ],TINGLING / NUMBNESS [   ]      PHYSICAL EXAMINATION:  GENERAL APPEARANCE: NO DISTRESS  HEENT:  NO PALLOR, NO  JVD,  NO   NODES, NECK SUPPLE  CVS: S1 +, S2 +,   RS: AEEB,  OCCASIONAL  RALES +,  WHEEZING+  ABD: SOFT, NT, NO, BS +  EXT: NO PE  SKIN: WARM,   SKELETAL:  ROM ACCEPTABLE  CNS:  AAO X 3    RADIOLOGY :    ACC: [id number] EXAM:  XR CHEST PORTABLE URGENT 1V                          PROCEDURE DATE:  03/18/2022          INTERPRETATION:  AP chest on March 18, 2022 at 12:19 PM. Patient is   hypoxic.    Heart magnified by technique.    Prominent pulmonary arteries suggest pulmonary hypertension.    Engorged vessels in the lower lung field suggesting CHF. On March 11   there was also an NG tube no longer evident.    Chest is otherwise similar to March 11.    IMPRESSION: Prominent pulmonary arteries and lower lung field CHF again   noted.      ASSESSMENT :       PLAN:  HPI:   61 year old Male, from assisted living  and medical history of CAD, CHF on BIPAP at NH, COPD (intubated ,later extubated a week ago), peripheral neuropathy, GERD, HTN, HLD, obesity, polyarthritis, Pulmonary HTN,  was brought in by EMS for shortness of breath. Patient was discharged from hospital, he states he started smoking again at his assisted living, this morning he has so much work of breathing so was sent to ER for further assessment. Patient denied having any chest pain, palpitations fever, nausea vomiting diarrhea or any other complains.   (18 Mar 2022 15:21)    - D/C PLAN TO  ASSITED LIVING    - COPD EXACERBATION, ACUTE ON CHRONIC HYPOXIC & HYPERCAPNOEIC RESPIRATORY FAILURE S/T NONCOMPLIANCE W/ TOBACCO CESSATION AND W/ BIPAP   - STARTED ON IV STEROIDS [TAPERING], SYMBICORT, SPIRIVA, AZITHROMYCIN, NEBS,  BiPAP, O2 SUPPLEMENTS  - S/P DIAMOX  - NICOTINE PATCH  - S/P CRITICAL CARE EVALUATION AND ICU ADMISSION    - UNCONTROLLED DM, GLUCOSE INTOLERANCE DUE TO STEROIDS  -  LANTUS, TID INSULIN, SSI + FS     - SMOKER, COUNSELLED TO QUIT SMOKING     - GI AND DVT PROPHYLAXIS

## 2022-03-21 NOTE — CHART NOTE - NSCHARTNOTEFT_GEN_A_CORE
Asked by pt to contact his sister/HCP Katherine to inform her of updates and discharge to [facility] .  Attempted call to Katherine. No answer at [phone].  Unable to leave msg .  Pt informed of above.     Courtney Hess NP Medicine   [phone]

## 2022-03-21 NOTE — PROGRESS NOTE ADULT - ATTENDING COMMENTS
61 year old man , from NH, active smoker, CAD, COPD, peripheral neuropathy, GERD, HTN, HLD, obesity, polyarthritis, Pulmonary HTN, TIA, Vit D Def came with chief complain of shortness of breath. Reported history of non compliance with bipap and continued smoking. In the ED, awake but appears tachypneic. VBG showing acute CO2 retention increased from baseline, likely COPD exacerbation due to continued smoking.     Assessment:  - Acute on Chronic  hypoxic hypercapnic respiratory failure   - COPD exacerbation   - HTN  - HLD  - CAD  - GERD  - Pulmonary HTN   - Obesity   - Mood disorder   - DM -2 uncontrol     Plan:  - ABG on Bipap is improved   - Cont. Nocturnal bipap   - Oxygen support to maintain saturation >88% when off bipap  - Will give Diamox for metabolic alkalosis  - Likely has worsening gas exchange as patient is non compliant with NIV at assisted living and continues to smoke   - Solumedrol IV  - Bronchodilators RTC  - Azithromycin  - Hemodynamic monitoring   - Monitor blood sugar with coverage with sliding scale insulin  - Cont. psych/pain medications  - Oral dysphagia diet  - Nicotine patch   - Smoking cessation recommended   - DVT GI prophy  - OOB to chair  - Transfer to SCU service
Problem/Plan - 1:  ·  Problem: Acute respiratory failure with hypoxia and hypercapnia.   ·  Plan: Resolved.   Continue BIPAP qHS and prn.   Continue supplemental O2 2L NC for SpO2 < 89%  Continue  Prednisone with taper    Continue inhalers: Albuterol, tiotropium, budesonide    Continue Azithromycin for 1 more day for  possible  PNA as pt p/w leukocytosis.   Monitor oxygenation  Smoke cessation.     Problem/Plan - 2:  ·  Problem: COPD exacerbation.   ·  Plan: Improved   Continue BIPAP qHS and prn.   Continue supplemental O2 2L NC for SpO2 < 89%  Continue  Prednisone with taper   Continue inhalers: Albuterol, tiotropium , budesonide    Continue Azithromycin for possible  PNA  Monitor oxygenation.     Problem/Plan - 3:  ·  Problem: CHF (congestive heart failure).   ·  Plan: Pt appears euvolemic.   Continue ASA  Continue  statin  Daily weight.     Problem/Plan - 4:  ·  Problem: Type 2 diabetes mellitus without complication, with long-term current use of insulin.   ·  Plan: A1C 7.4  No uncontrolled hyperglycemia due to steroids.   Continue basal Lantus 12 units. Will increase to 15 units upon discharge to [facility] per Dr. Ramos.   Add premeal Admelog 3 units TID .  Monitor BG ac/HS and cover per corrective scale.   Goal 140-180.     Problem/Plan - 5:  ·  Problem: HTN (hypertension).   ·  Plan: Continue ASA, statin.     Problem/Plan - 6:  ·  Problem: Pulmonary HTN.   ·  Plan: likely due to COPD and CHF.   Continue BIPAP , supplemental oxygen as above  Continue bronchodilator  Monitor for fluid overload, daily weight.   Smoke cessation.     Problem/Plan - 7:  ·  Problem: Peripheral neuropathy.   ·  Plan: Continue gabapentin.     Problem/Plan - 8:  ·  Problem: Current smoker.   ·  Plan: Continue Nicotine patch  Pt counseled on smoke cessation.     Problem/Plan - 9:  ·  Problem: Coronary artery disease involving native coronary artery of native heart without angina pectoris.   ·  Plan: Continue ASA and statin.     Problem/Plan - 10:  ·  Problem: Need for prophylactic measure.   ·  Plan; DVT ppx: heparin SC  GI ppx: PPI.     Problem/Plan - 11:  ·  Problem: Discharge planning issues.   ·  Plan: Pt is  medically optimized for discharge back to [facility]   PT endorses pt has no skilled PT needs.   CM following.
61 year old man , from NH, active smoker, CAD, COPD, peripheral neuropathy, GERD, HTN, HLD, obesity, polyarthritis, Pulmonary HTN, TIA, Vit D Def came with chief complain of shortness of breath. Reported history of non compliance with bipap and continued smoking. In the ED, awake but appears tachypneic. VBG showing acute CO2 retention increased from baseline, likely COPD exacerbation due to continued smoking.     Assessment:  - Acute on Chronic  hypoxic hypercapnic respiratory failure   - COPD exacerbation   - HTN  - HLD  - CAD  - GERD  - Pulmonary HTN   - Obesity   - Mood disorder   - DM -2 uncontrol     Plan:  - ABG on Bipap is improved   - Cont. Nocturnal bipap   - Oxygen support to maintain saturation >88% when off bipap  - Likely has worsening gas exchange as patient is non compliant with NIV at assisted living and continues to smoke   - Transition to PO steroids   - Bronchodilators RTC  - Azithromycin x 5 days  - Hemodynamic monitoring   - Monitor blood sugar with coverage with sliding scale insulin  - Add premeal insulin coverage  - Cont. psych/pain medications  - Oral dysphagia diet  - Nicotine patch   - Smoking cessation recommended   - DVT GI prophy  - OOB to chair

## 2022-03-21 NOTE — PROGRESS NOTE ADULT - PROBLEM SELECTOR PLAN 11
Pt is  medically optimized for discharge back to [facility]   PT endorses pt has no skilled PT needs.   CM following.
Pt from [facility] noncompliant with BIPAP, active smoker.   Counseled pt on adherence to BIPAP use and smoke cessation. Pt verbalizes understanding.

## 2022-03-21 NOTE — PROGRESS NOTE ADULT - REASON FOR ADMISSION
acute hypoxic hypercapnic respiratory failure

## 2022-03-21 NOTE — PROGRESS NOTE ADULT - PROBLEM SELECTOR PLAN 6
likely due to COPD and CHF.   Continue BIPAP , supplemental oxygen as above  Continue bronchodilator  Monitor for fluid overload, daily weight.   Smoke cessation.

## 2022-03-21 NOTE — PROGRESS NOTE ADULT - PROBLEM SELECTOR PLAN 2
Improved   Continue BIPAP qHS and prn.   Continue supplemental O2 2L NC for SpO2 < 89%  Continue  Prednisone with taper   Continue inhalers: Albuterol, tiotropium , budesonide    Continue Azithromycin for possible  PNA  Monitor oxygenation.

## 2022-03-21 NOTE — PROGRESS NOTE ADULT - ASSESSMENT
61 year old man from [facility], active smoker, with a PMHx of CAD, CHF on nocturnal BIPAP, COPD (intubation in past), peripheral neuropathy, GERD, HTN, HLD, obesity, polyarthritis, Pulmonary HTN, TIA, Vit D Def, and recent admission to [facility] for respiratory failure requiring intubation (3/9 - 15), who re-presented to [facility] on 3/18 for acute on chronic respiratory failure with hypoxia and hypercapnia.  Patient reports that while after this past discharge he began smoking again and was not adherent with his BiPAP, prompting the re-admission. Admitted from ED to ICU for new BiPAP and close monitoring due to concern for re-intubation. In ICU was found to have hypercapnia and elevated serum bicarb, mild improvement after resuming nocturnal BiPAP. Re-started on Solumedrol 40q8h, continued on Symbicort, Spiriva, and albuterol.   Pt improved and downgraded to SCU 3/19.

## 2022-03-21 NOTE — DISCHARGE NOTE NURSING/CASE MANAGEMENT/SOCIAL WORK - NSDCPEFALRISK_GEN_ALL_CORE
For information on Fall & Injury Prevention, visit: [email or web address] OR  [email or web address] OR  https://www.cdc.gov/steadi/patient.html

## 2022-03-21 NOTE — PROGRESS NOTE ADULT - PROBLEM SELECTOR PROBLEM 4
CHF (congestive heart failure)
Type 2 diabetes mellitus without complication, with long-term current use of insulin

## 2022-03-21 NOTE — DISCHARGE NOTE NURSING/CASE MANAGEMENT/SOCIAL WORK - NSDCPECARECOPD_GEN_ALL_CORE
Call 911 for increased or worsened COPD symptoms/Need for follow up after discharge/Prescribed medication/Risk factors for COPD/Support groups for patient/Signs and symptoms of COPD

## 2022-03-21 NOTE — PROGRESS NOTE ADULT - PROBLEM SELECTOR PLAN 4
A1C 7.4  No uncontrolled hyperglycemia due to steroids.   Continue basal Lantus 12 units. Will increase to 15 units upon discharge to [facility] per Dr. Ramos.   Add premeal Admelog 3 units TID .  Monitor BG ac/HS and cover per corrective scale.   Goal 140-180.
Pt appears euvolemic.   Continue ASA  Add statin  Daily weight.

## 2022-03-21 NOTE — DISCHARGE NOTE NURSING/CASE MANAGEMENT/SOCIAL WORK - NSDCPECARECOPDSIGNS_GEN_ALL_CORE_FT
Shortness of breath, chest pain, lightheaded, tiredness and fatigue while doing daily activities. Heart racing while at rest or while doing everyday activities. Chest tightness, wheezing sound made while breathing and constant coughing that may produce mucus (also called phlegm or sputum) or blood. Discoloration of the lips or fingernail beds. Arm, ankle, or leg feels warm, tender, and painful. It may look swollen and red.

## 2022-03-21 NOTE — PROGRESS NOTE ADULT - PROBLEM SELECTOR PLAN 10
DVT ppx: heparin SC  GI ppx: PPI
DVT ppx: Heparin SC  GI ppx: PPI  Continue xanax, trazodone for anxiety

## 2022-03-24 ENCOUNTER — INPATIENT (INPATIENT)
Facility: HOSPITAL | Age: 62
LOS: 6 days | Discharge: HOME | DRG: 208 | End: 2022-03-31
Attending: INTERNAL MEDICINE | Admitting: INTERNAL MEDICINE
Payer: MEDICAID

## 2022-03-24 VITALS
HEIGHT: 68 IN | TEMPERATURE: 99 F | SYSTOLIC BLOOD PRESSURE: 105 MMHG | OXYGEN SATURATION: 97 % | HEART RATE: 119 BPM | DIASTOLIC BLOOD PRESSURE: 70 MMHG | RESPIRATION RATE: 18 BRPM

## 2022-03-24 LAB
ALBUMIN SERPL ELPH-MCNC: 3.3 G/DL — LOW (ref 3.5–5)
ALP SERPL-CCNC: 53 U/L — SIGNIFICANT CHANGE UP (ref 40–120)
ALT FLD-CCNC: 33 U/L DA — SIGNIFICANT CHANGE UP (ref 10–60)
ANION GAP SERPL CALC-SCNC: <-1 MMOL/L — LOW (ref 5–17)
APTT BLD: 29.3 SEC — SIGNIFICANT CHANGE UP (ref 27.5–35.5)
AST SERPL-CCNC: 21 U/L — SIGNIFICANT CHANGE UP (ref 10–40)
BASE EXCESS BLDV CALC-SCNC: SIGNIFICANT CHANGE UP MMOL/L
BASOPHILS # BLD AUTO: 0.02 K/UL — SIGNIFICANT CHANGE UP (ref 0–0.2)
BASOPHILS NFR BLD AUTO: 0.2 % — SIGNIFICANT CHANGE UP (ref 0–2)
BILIRUB SERPL-MCNC: 0.2 MG/DL — SIGNIFICANT CHANGE UP (ref 0.2–1.2)
BLOOD GAS COMMENTS, VENOUS: SIGNIFICANT CHANGE UP
BUN SERPL-MCNC: 25 MG/DL — HIGH (ref 7–18)
CALCIUM SERPL-MCNC: 8.6 MG/DL — SIGNIFICANT CHANGE UP (ref 8.4–10.5)
CHLORIDE SERPL-SCNC: 91 MMOL/L — LOW (ref 96–108)
CO2 SERPL-SCNC: >45 MMOL/L — CRITICAL HIGH (ref 22–31)
CREAT SERPL-MCNC: 0.71 MG/DL — SIGNIFICANT CHANGE UP (ref 0.5–1.3)
EGFR: 106 ML/MIN/1.73M2 — SIGNIFICANT CHANGE UP
EOSINOPHIL # BLD AUTO: 0.06 K/UL — SIGNIFICANT CHANGE UP (ref 0–0.5)
EOSINOPHIL NFR BLD AUTO: 0.5 % — SIGNIFICANT CHANGE UP (ref 0–6)
GLUCOSE SERPL-MCNC: 113 MG/DL — HIGH (ref 70–99)
HCO3 BLDV-SCNC: SIGNIFICANT CHANGE UP MMOL/L (ref 22–29)
HCT VFR BLD CALC: 42.2 % — SIGNIFICANT CHANGE UP (ref 39–50)
HGB BLD-MCNC: 12.6 G/DL — LOW (ref 13–17)
HOROWITZ INDEX BLDV+IHG-RTO: 21 — SIGNIFICANT CHANGE UP
IMM GRANULOCYTES NFR BLD AUTO: 0.6 % — SIGNIFICANT CHANGE UP (ref 0–1.5)
INR BLD: 0.97 RATIO — SIGNIFICANT CHANGE UP (ref 0.88–1.16)
LACTATE SERPL-SCNC: 0.4 MMOL/L — LOW (ref 0.7–2)
LYMPHOCYTES # BLD AUTO: 15.5 % — SIGNIFICANT CHANGE UP (ref 13–44)
LYMPHOCYTES # BLD AUTO: 2.06 K/UL — SIGNIFICANT CHANGE UP (ref 1–3.3)
MCHC RBC-ENTMCNC: 28.4 PG — SIGNIFICANT CHANGE UP (ref 27–34)
MCHC RBC-ENTMCNC: 29.9 GM/DL — LOW (ref 32–36)
MCV RBC AUTO: 95 FL — SIGNIFICANT CHANGE UP (ref 80–100)
MONOCYTES # BLD AUTO: 0.84 K/UL — SIGNIFICANT CHANGE UP (ref 0–0.9)
MONOCYTES NFR BLD AUTO: 6.3 % — SIGNIFICANT CHANGE UP (ref 2–14)
NEUTROPHILS # BLD AUTO: 10.25 K/UL — HIGH (ref 1.8–7.4)
NEUTROPHILS NFR BLD AUTO: 76.9 % — SIGNIFICANT CHANGE UP (ref 43–77)
NRBC # BLD: 0 /100 WBCS — SIGNIFICANT CHANGE UP (ref 0–0)
NT-PROBNP SERPL-SCNC: 370 PG/ML — HIGH (ref 0–125)
PCO2 BLDV: >125 MMHG — HIGH (ref 42–55)
PH BLDV: 7.2 — LOW (ref 7.32–7.43)
PLATELET # BLD AUTO: 162 K/UL — SIGNIFICANT CHANGE UP (ref 150–400)
PO2 BLDV: 67 MMHG — SIGNIFICANT CHANGE UP
POTASSIUM SERPL-MCNC: 4.3 MMOL/L — SIGNIFICANT CHANGE UP (ref 3.5–5.3)
POTASSIUM SERPL-SCNC: 4.3 MMOL/L — SIGNIFICANT CHANGE UP (ref 3.5–5.3)
PROT SERPL-MCNC: 6.6 G/DL — SIGNIFICANT CHANGE UP (ref 6–8.3)
PROTHROM AB SERPL-ACNC: 11.5 SEC — SIGNIFICANT CHANGE UP (ref 10.5–13.4)
RBC # BLD: 4.44 M/UL — SIGNIFICANT CHANGE UP (ref 4.2–5.8)
RBC # FLD: 12.9 % — SIGNIFICANT CHANGE UP (ref 10.3–14.5)
SAO2 % BLDV: 93.6 % — SIGNIFICANT CHANGE UP
SARS-COV-2 RNA SPEC QL NAA+PROBE: SIGNIFICANT CHANGE UP
SODIUM SERPL-SCNC: 135 MMOL/L — SIGNIFICANT CHANGE UP (ref 135–145)
TROPONIN I, HIGH SENSITIVITY RESULT: 33.1 NG/L — SIGNIFICANT CHANGE UP
TSH SERPL-MCNC: 0.65 UU/ML — SIGNIFICANT CHANGE UP (ref 0.34–4.82)
WBC # BLD: 13.31 K/UL — HIGH (ref 3.8–10.5)
WBC # FLD AUTO: 13.31 K/UL — HIGH (ref 3.8–10.5)

## 2022-03-24 PROCEDURE — 71045 X-RAY EXAM CHEST 1 VIEW: CPT | Mod: 26

## 2022-03-24 PROCEDURE — 31500 INSERT EMERGENCY AIRWAY: CPT

## 2022-03-24 PROCEDURE — 99291 CRITICAL CARE FIRST HOUR: CPT | Mod: 25

## 2022-03-24 PROCEDURE — 93010 ELECTROCARDIOGRAM REPORT: CPT

## 2022-03-24 NOTE — ED ADULT NURSE NOTE - OBJECTIVE STATEMENT
brought in by ambulance from [facility] due to change in mental status been sleeping all day with fever, pt noted lethargic on ED arrival responsive to pain , goes back to sleep , noted with bruised abdominal area, multiple redness to left arm. Seen and examined by Dr Estevez at bedside on cont. cardiac monitor, EKg done at bedside,  mg /dl.

## 2022-03-24 NOTE — ED PROVIDER NOTE - OBJECTIVE STATEMENT
61 year old male PMH CAD, CHF, COPD on O2, neuropathy, GERD, HTN, HLD, pulm HTN, nightly BIPAP coming in for weakness/AMS as per EMS. pt not providing any further history. Pt recently DC from this hospital for hypercapnic resp failure.

## 2022-03-24 NOTE — ED PROVIDER NOTE - PROGRESS NOTE DETAILS
on bipap. labs with leukocytosis (pt on steroids as per med list), resp acidosis. still minimally responsive- pending ABG given on bipap for 1.5 hours. no improvement on bipap. ABG not significantly changed. intubated. ICU consulted. will admit.

## 2022-03-24 NOTE — ED ADULT NURSE NOTE - CHIEF COMPLAINT QUOTE
Pt BIBA for compliant of weakness from [facility]. pt is lethargic. as per EMS per is oxygen dependent using nasal canal 4L.

## 2022-03-24 NOTE — ED ADULT TRIAGE NOTE - CHIEF COMPLAINT QUOTE
Pt BIBA for compliant of weakness from [facility]. pt is lethargic. as per EMS per is oxygen dependent using nasal canal 4L.

## 2022-03-25 DIAGNOSIS — J96.02 ACUTE RESPIRATORY FAILURE WITH HYPERCAPNIA: ICD-10-CM

## 2022-03-25 LAB
ALBUMIN SERPL ELPH-MCNC: 2.8 G/DL — LOW (ref 3.5–5)
ALP SERPL-CCNC: 45 U/L — SIGNIFICANT CHANGE UP (ref 40–120)
ALT FLD-CCNC: 29 U/L DA — SIGNIFICANT CHANGE UP (ref 10–60)
ANION GAP SERPL CALC-SCNC: 1 MMOL/L — LOW (ref 5–17)
APPEARANCE UR: CLEAR — SIGNIFICANT CHANGE UP
AST SERPL-CCNC: 18 U/L — SIGNIFICANT CHANGE UP (ref 10–40)
BASE EXCESS BLDA CALC-SCNC: 16 MMOL/L — HIGH (ref -2–3)
BASE EXCESS BLDA CALC-SCNC: 18.3 MMOL/L — HIGH (ref -2–3)
BASE EXCESS BLDA CALC-SCNC: SIGNIFICANT CHANGE UP MMOL/L (ref -2–3)
BASOPHILS # BLD AUTO: 0.01 K/UL — SIGNIFICANT CHANGE UP (ref 0–0.2)
BASOPHILS NFR BLD AUTO: 0.1 % — SIGNIFICANT CHANGE UP (ref 0–2)
BILIRUB SERPL-MCNC: 0.4 MG/DL — SIGNIFICANT CHANGE UP (ref 0.2–1.2)
BILIRUB UR-MCNC: NEGATIVE — SIGNIFICANT CHANGE UP
BLOOD GAS COMMENTS ARTERIAL: SIGNIFICANT CHANGE UP
BLOOD GAS COMMENTS ARTERIAL: SIGNIFICANT CHANGE UP
BUN SERPL-MCNC: 26 MG/DL — HIGH (ref 7–18)
CALCIUM SERPL-MCNC: 8.6 MG/DL — SIGNIFICANT CHANGE UP (ref 8.4–10.5)
CHLORIDE SERPL-SCNC: 93 MMOL/L — LOW (ref 96–108)
CO2 SERPL-SCNC: 44 MMOL/L — HIGH (ref 22–31)
COLOR SPEC: YELLOW — SIGNIFICANT CHANGE UP
CREAT SERPL-MCNC: 0.71 MG/DL — SIGNIFICANT CHANGE UP (ref 0.5–1.3)
DIFF PNL FLD: NEGATIVE — SIGNIFICANT CHANGE UP
EGFR: 106 ML/MIN/1.73M2 — SIGNIFICANT CHANGE UP
EOSINOPHIL # BLD AUTO: 0.03 K/UL — SIGNIFICANT CHANGE UP (ref 0–0.5)
EOSINOPHIL NFR BLD AUTO: 0.2 % — SIGNIFICANT CHANGE UP (ref 0–6)
GLUCOSE SERPL-MCNC: 167 MG/DL — HIGH (ref 70–99)
GLUCOSE UR QL: 100 MG/DL
HCO3 BLDA-SCNC: 44 MMOL/L — HIGH (ref 21–28)
HCO3 BLDA-SCNC: 45 MMOL/L — CRITICAL HIGH (ref 21–28)
HCO3 BLDA-SCNC: SIGNIFICANT CHANGE UP MMOL/L (ref 21–28)
HCT VFR BLD CALC: 37.1 % — LOW (ref 39–50)
HGB BLD-MCNC: 11.1 G/DL — LOW (ref 13–17)
HOROWITZ INDEX BLDA+IHG-RTO: 100 — SIGNIFICANT CHANGE UP
HOROWITZ INDEX BLDA+IHG-RTO: 50 — SIGNIFICANT CHANGE UP
HOROWITZ INDEX BLDA+IHG-RTO: SIGNIFICANT CHANGE UP
IMM GRANULOCYTES NFR BLD AUTO: 0.5 % — SIGNIFICANT CHANGE UP (ref 0–1.5)
KETONES UR-MCNC: NEGATIVE — SIGNIFICANT CHANGE UP
LEUKOCYTE ESTERASE UR-ACNC: ABNORMAL
LYMPHOCYTES # BLD AUTO: 1.01 K/UL — SIGNIFICANT CHANGE UP (ref 1–3.3)
LYMPHOCYTES # BLD AUTO: 7.7 % — LOW (ref 13–44)
MAGNESIUM SERPL-MCNC: 2 MG/DL — SIGNIFICANT CHANGE UP (ref 1.6–2.6)
MCHC RBC-ENTMCNC: 28.5 PG — SIGNIFICANT CHANGE UP (ref 27–34)
MCHC RBC-ENTMCNC: 29.9 GM/DL — LOW (ref 32–36)
MCV RBC AUTO: 95.1 FL — SIGNIFICANT CHANGE UP (ref 80–100)
MONOCYTES # BLD AUTO: 0.33 K/UL — SIGNIFICANT CHANGE UP (ref 0–0.9)
MONOCYTES NFR BLD AUTO: 2.5 % — SIGNIFICANT CHANGE UP (ref 2–14)
MRSA PCR RESULT.: SIGNIFICANT CHANGE UP
NEUTROPHILS # BLD AUTO: 11.69 K/UL — HIGH (ref 1.8–7.4)
NEUTROPHILS NFR BLD AUTO: 89 % — HIGH (ref 43–77)
NITRITE UR-MCNC: NEGATIVE — SIGNIFICANT CHANGE UP
NRBC # BLD: 0 /100 WBCS — SIGNIFICANT CHANGE UP (ref 0–0)
PCO2 BLDA: 59 MMHG — HIGH (ref 35–48)
PCO2 BLDA: 64 MMHG — HIGH (ref 35–48)
PCO2 BLDA: >125 MMHG — CRITICAL HIGH (ref 35–48)
PH BLDA: 7.23 — LOW (ref 7.35–7.45)
PH BLDA: 7.44 — SIGNIFICANT CHANGE UP (ref 7.35–7.45)
PH BLDA: 7.49 — HIGH (ref 7.35–7.45)
PH UR: 6 — SIGNIFICANT CHANGE UP (ref 5–8)
PHOSPHATE SERPL-MCNC: 2.3 MG/DL — LOW (ref 2.5–4.5)
PLATELET # BLD AUTO: 153 K/UL — SIGNIFICANT CHANGE UP (ref 150–400)
PO2 BLDA: 377 MMHG — HIGH (ref 83–108)
PO2 BLDA: 57 MMHG — LOW (ref 83–108)
PO2 BLDA: 67 MMHG — LOW (ref 83–108)
POTASSIUM SERPL-MCNC: 5.1 MMOL/L — SIGNIFICANT CHANGE UP (ref 3.5–5.3)
POTASSIUM SERPL-SCNC: 5.1 MMOL/L — SIGNIFICANT CHANGE UP (ref 3.5–5.3)
PROT SERPL-MCNC: 5.6 G/DL — LOW (ref 6–8.3)
PROT UR-MCNC: 30 MG/DL
RBC # BLD: 3.9 M/UL — LOW (ref 4.2–5.8)
RBC # FLD: 13 % — SIGNIFICANT CHANGE UP (ref 10.3–14.5)
S AUREUS DNA NOSE QL NAA+PROBE: SIGNIFICANT CHANGE UP
SAO2 % BLDA: 100 % — SIGNIFICANT CHANGE UP
SAO2 % BLDA: 92 % — SIGNIFICANT CHANGE UP
SAO2 % BLDA: 94 % — SIGNIFICANT CHANGE UP
SODIUM SERPL-SCNC: 138 MMOL/L — SIGNIFICANT CHANGE UP (ref 135–145)
SP GR SPEC: 1.02 — SIGNIFICANT CHANGE UP (ref 1.01–1.02)
UROBILINOGEN FLD QL: NEGATIVE — SIGNIFICANT CHANGE UP
WBC # BLD: 13.14 K/UL — HIGH (ref 3.8–10.5)
WBC # FLD AUTO: 13.14 K/UL — HIGH (ref 3.8–10.5)

## 2022-03-25 PROCEDURE — 71045 X-RAY EXAM CHEST 1 VIEW: CPT | Mod: 26

## 2022-03-25 PROCEDURE — 99291 CRITICAL CARE FIRST HOUR: CPT

## 2022-03-25 RX ORDER — INSULIN GLARGINE 100 [IU]/ML
10 INJECTION, SOLUTION SUBCUTANEOUS EVERY MORNING
Refills: 0 | Status: DISCONTINUED | OUTPATIENT
Start: 2022-03-25 | End: 2022-03-28

## 2022-03-25 RX ORDER — NOREPINEPHRINE BITARTRATE/D5W 8 MG/250ML
0.05 PLASTIC BAG, INJECTION (ML) INTRAVENOUS
Qty: 16 | Refills: 0 | Status: DISCONTINUED | OUTPATIENT
Start: 2022-03-25 | End: 2022-03-25

## 2022-03-25 RX ORDER — BUDESONIDE AND FORMOTEROL FUMARATE DIHYDRATE 160; 4.5 UG/1; UG/1
2 AEROSOL RESPIRATORY (INHALATION)
Refills: 0 | Status: DISCONTINUED | OUTPATIENT
Start: 2022-03-25 | End: 2022-03-31

## 2022-03-25 RX ORDER — DEXTROSE 50 % IN WATER 50 %
12.5 SYRINGE (ML) INTRAVENOUS ONCE
Refills: 0 | Status: DISCONTINUED | OUTPATIENT
Start: 2022-03-25 | End: 2022-03-25

## 2022-03-25 RX ORDER — DEXTROSE 50 % IN WATER 50 %
25 SYRINGE (ML) INTRAVENOUS ONCE
Refills: 0 | Status: DISCONTINUED | OUTPATIENT
Start: 2022-03-25 | End: 2022-03-25

## 2022-03-25 RX ORDER — GLUCAGON INJECTION, SOLUTION 0.5 MG/.1ML
1 INJECTION, SOLUTION SUBCUTANEOUS ONCE
Refills: 0 | Status: DISCONTINUED | OUTPATIENT
Start: 2022-03-25 | End: 2022-03-31

## 2022-03-25 RX ORDER — PROPOFOL 10 MG/ML
15 INJECTION, EMULSION INTRAVENOUS
Qty: 500 | Refills: 0 | Status: DISCONTINUED | OUTPATIENT
Start: 2022-03-25 | End: 2022-03-25

## 2022-03-25 RX ORDER — PANTOPRAZOLE SODIUM 20 MG/1
40 TABLET, DELAYED RELEASE ORAL AT BEDTIME
Refills: 0 | Status: DISCONTINUED | OUTPATIENT
Start: 2022-03-25 | End: 2022-03-31

## 2022-03-25 RX ORDER — NICOTINE POLACRILEX 2 MG
1 GUM BUCCAL DAILY
Refills: 0 | Status: DISCONTINUED | OUTPATIENT
Start: 2022-03-25 | End: 2022-03-25

## 2022-03-25 RX ORDER — OXYCODONE HYDROCHLORIDE 5 MG/1
5 TABLET ORAL ONCE
Refills: 0 | Status: DISCONTINUED | OUTPATIENT
Start: 2022-03-25 | End: 2022-03-25

## 2022-03-25 RX ORDER — DEXTROSE 50 % IN WATER 50 %
15 SYRINGE (ML) INTRAVENOUS ONCE
Refills: 0 | Status: DISCONTINUED | OUTPATIENT
Start: 2022-03-25 | End: 2022-03-25

## 2022-03-25 RX ORDER — ALBUTEROL 90 UG/1
2 AEROSOL, METERED ORAL EVERY 6 HOURS
Refills: 0 | Status: DISCONTINUED | OUTPATIENT
Start: 2022-03-25 | End: 2022-03-31

## 2022-03-25 RX ORDER — ATORVASTATIN CALCIUM 80 MG/1
40 TABLET, FILM COATED ORAL AT BEDTIME
Refills: 0 | Status: DISCONTINUED | OUTPATIENT
Start: 2022-03-25 | End: 2022-03-31

## 2022-03-25 RX ORDER — SODIUM CHLORIDE 9 MG/ML
1000 INJECTION, SOLUTION INTRAVENOUS
Refills: 0 | Status: DISCONTINUED | OUTPATIENT
Start: 2022-03-25 | End: 2022-03-25

## 2022-03-25 RX ORDER — CHLORHEXIDINE GLUCONATE 213 G/1000ML
15 SOLUTION TOPICAL EVERY 12 HOURS
Refills: 0 | Status: DISCONTINUED | OUTPATIENT
Start: 2022-03-25 | End: 2022-03-25

## 2022-03-25 RX ORDER — LANOLIN ALCOHOL/MO/W.PET/CERES
5 CREAM (GRAM) TOPICAL AT BEDTIME
Refills: 0 | Status: DISCONTINUED | OUTPATIENT
Start: 2022-03-25 | End: 2022-03-31

## 2022-03-25 RX ORDER — ENOXAPARIN SODIUM 100 MG/ML
40 INJECTION SUBCUTANEOUS EVERY 24 HOURS
Refills: 0 | Status: DISCONTINUED | OUTPATIENT
Start: 2022-03-25 | End: 2022-03-31

## 2022-03-25 RX ORDER — SODIUM CHLORIDE 9 MG/ML
1000 INJECTION INTRAMUSCULAR; INTRAVENOUS; SUBCUTANEOUS ONCE
Refills: 0 | Status: COMPLETED | OUTPATIENT
Start: 2022-03-25 | End: 2022-03-25

## 2022-03-25 RX ORDER — ASPIRIN/CALCIUM CARB/MAGNESIUM 324 MG
81 TABLET ORAL DAILY
Refills: 0 | Status: DISCONTINUED | OUTPATIENT
Start: 2022-03-25 | End: 2022-03-31

## 2022-03-25 RX ORDER — NICOTINE POLACRILEX 2 MG
1 GUM BUCCAL DAILY
Refills: 0 | Status: DISCONTINUED | OUTPATIENT
Start: 2022-03-25 | End: 2022-03-31

## 2022-03-25 RX ORDER — TIOTROPIUM BROMIDE 18 UG/1
1 CAPSULE ORAL; RESPIRATORY (INHALATION) DAILY
Refills: 0 | Status: DISCONTINUED | OUTPATIENT
Start: 2022-03-25 | End: 2022-03-31

## 2022-03-25 RX ORDER — CHLORHEXIDINE GLUCONATE 213 G/1000ML
1 SOLUTION TOPICAL
Refills: 0 | Status: DISCONTINUED | OUTPATIENT
Start: 2022-03-25 | End: 2022-03-25

## 2022-03-25 RX ORDER — FENTANYL CITRATE 50 UG/ML
0.5 INJECTION INTRAVENOUS
Qty: 2500 | Refills: 0 | Status: DISCONTINUED | OUTPATIENT
Start: 2022-03-25 | End: 2022-03-25

## 2022-03-25 RX ORDER — GABAPENTIN 400 MG/1
100 CAPSULE ORAL THREE TIMES A DAY
Refills: 0 | Status: DISCONTINUED | OUTPATIENT
Start: 2022-03-25 | End: 2022-03-31

## 2022-03-25 RX ORDER — INSULIN LISPRO 100/ML
VIAL (ML) SUBCUTANEOUS EVERY 6 HOURS
Refills: 0 | Status: DISCONTINUED | OUTPATIENT
Start: 2022-03-25 | End: 2022-03-25

## 2022-03-25 RX ORDER — INSULIN LISPRO 100/ML
VIAL (ML) SUBCUTANEOUS
Refills: 0 | Status: DISCONTINUED | OUTPATIENT
Start: 2022-03-25 | End: 2022-03-30

## 2022-03-25 RX ORDER — IPRATROPIUM/ALBUTEROL SULFATE 18-103MCG
3 AEROSOL WITH ADAPTER (GRAM) INHALATION EVERY 6 HOURS
Refills: 0 | Status: DISCONTINUED | OUTPATIENT
Start: 2022-03-25 | End: 2022-03-25

## 2022-03-25 RX ORDER — NOREPINEPHRINE BITARTRATE/D5W 8 MG/250ML
0.05 PLASTIC BAG, INJECTION (ML) INTRAVENOUS
Qty: 8 | Refills: 0 | Status: DISCONTINUED | OUTPATIENT
Start: 2022-03-25 | End: 2022-03-25

## 2022-03-25 RX ADMIN — Medication 1: at 05:58

## 2022-03-25 RX ADMIN — Medication 125 MILLIGRAM(S): at 02:00

## 2022-03-25 RX ADMIN — Medication 2: at 11:13

## 2022-03-25 RX ADMIN — Medication 40 MILLIGRAM(S): at 21:52

## 2022-03-25 RX ADMIN — Medication 1: at 17:14

## 2022-03-25 RX ADMIN — CHLORHEXIDINE GLUCONATE 1 APPLICATION(S): 213 SOLUTION TOPICAL at 05:38

## 2022-03-25 RX ADMIN — GABAPENTIN 100 MILLIGRAM(S): 400 CAPSULE ORAL at 05:38

## 2022-03-25 RX ADMIN — Medication 81 MILLIGRAM(S): at 11:12

## 2022-03-25 RX ADMIN — ENOXAPARIN SODIUM 40 MILLIGRAM(S): 100 INJECTION SUBCUTANEOUS at 05:38

## 2022-03-25 RX ADMIN — Medication 40 MILLIGRAM(S): at 13:04

## 2022-03-25 RX ADMIN — ATORVASTATIN CALCIUM 40 MILLIGRAM(S): 80 TABLET, FILM COATED ORAL at 21:51

## 2022-03-25 RX ADMIN — Medication 5 MILLIGRAM(S): at 20:44

## 2022-03-25 RX ADMIN — FENTANYL CITRATE 4.85 MICROGRAM(S)/KG/HR: 50 INJECTION INTRAVENOUS at 02:30

## 2022-03-25 RX ADMIN — Medication 1: at 22:21

## 2022-03-25 RX ADMIN — PROPOFOL 8.73 MICROGRAM(S)/KG/MIN: 10 INJECTION, EMULSION INTRAVENOUS at 01:38

## 2022-03-25 RX ADMIN — CHLORHEXIDINE GLUCONATE 15 MILLILITER(S): 213 SOLUTION TOPICAL at 17:13

## 2022-03-25 RX ADMIN — CHLORHEXIDINE GLUCONATE 15 MILLILITER(S): 213 SOLUTION TOPICAL at 05:39

## 2022-03-25 RX ADMIN — SODIUM CHLORIDE 1000 MILLILITER(S): 9 INJECTION INTRAMUSCULAR; INTRAVENOUS; SUBCUTANEOUS at 01:07

## 2022-03-25 RX ADMIN — OXYCODONE HYDROCHLORIDE 5 MILLIGRAM(S): 5 TABLET ORAL at 16:24

## 2022-03-25 RX ADMIN — GABAPENTIN 100 MILLIGRAM(S): 400 CAPSULE ORAL at 13:04

## 2022-03-25 RX ADMIN — INSULIN GLARGINE 10 UNIT(S): 100 INJECTION, SOLUTION SUBCUTANEOUS at 07:05

## 2022-03-25 RX ADMIN — GABAPENTIN 100 MILLIGRAM(S): 400 CAPSULE ORAL at 21:51

## 2022-03-25 RX ADMIN — OXYCODONE HYDROCHLORIDE 5 MILLIGRAM(S): 5 TABLET ORAL at 16:54

## 2022-03-25 RX ADMIN — Medication 1 PATCH: at 11:09

## 2022-03-25 RX ADMIN — Medication 40 MILLIGRAM(S): at 05:38

## 2022-03-25 RX ADMIN — PANTOPRAZOLE SODIUM 40 MILLIGRAM(S): 20 TABLET, DELAYED RELEASE ORAL at 22:30

## 2022-03-25 NOTE — PROGRESS NOTE ADULT - SUBJECTIVE AND OBJECTIVE BOX
INTERVAL HPI/OVERNIGHT EVENTS:       PRESSORS: [ ] YES [ ] NO  WHICH:    ANTIBIOTICS:                  DATE STARTED:  ANTIBIOTICS:                  DATE STARTED:    Antimicrobial:    Cardiovascular:    Pulmonary:  albuterol/ipratropium for Nebulization 3 milliLiter(s) Nebulizer every 6 hours  budesonide 160 MICROgram(s)/formoterol 4.5 MICROgram(s) Inhaler 2 Puff(s) Inhalation two times a day    Hematalogic:  aspirin  chewable 81 milliGRAM(s) Oral daily  enoxaparin Injectable 40 milliGRAM(s) SubCutaneous every 24 hours    Other:  atorvastatin 40 milliGRAM(s) Oral at bedtime  chlorhexidine 0.12% Liquid 15 milliLiter(s) Oral Mucosa every 12 hours  chlorhexidine 2% Cloths 1 Application(s) Topical <User Schedule>  fentaNYL   Infusion. 0.5 MICROgram(s)/kG/Hr IV Continuous <Continuous>  gabapentin 100 milliGRAM(s) Oral three times a day  glucagon  Injectable 1 milliGRAM(s) IntraMuscular once  insulin glargine Injectable (LANTUS) 10 Unit(s) SubCutaneous every morning  insulin lispro (ADMELOG) corrective regimen sliding scale   SubCutaneous every 6 hours  methylPREDNISolone sodium succinate Injectable 40 milliGRAM(s) IV Push every 8 hours  nicotine -  14 mG/24Hr(s) Patch 1 patch Transdermal daily  pantoprazole  Injectable 40 milliGRAM(s) IV Push at bedtime  propofol Infusion 15 MICROgram(s)/kG/Min IV Continuous <Continuous>    albuterol/ipratropium for Nebulization 3 milliLiter(s) Nebulizer every 6 hours  aspirin  chewable 81 milliGRAM(s) Oral daily  atorvastatin 40 milliGRAM(s) Oral at bedtime  budesonide 160 MICROgram(s)/formoterol 4.5 MICROgram(s) Inhaler 2 Puff(s) Inhalation two times a day  chlorhexidine 0.12% Liquid 15 milliLiter(s) Oral Mucosa every 12 hours  chlorhexidine 2% Cloths 1 Application(s) Topical <User Schedule>  enoxaparin Injectable 40 milliGRAM(s) SubCutaneous every 24 hours  fentaNYL   Infusion. 0.5 MICROgram(s)/kG/Hr IV Continuous <Continuous>  gabapentin 100 milliGRAM(s) Oral three times a day  glucagon  Injectable 1 milliGRAM(s) IntraMuscular once  insulin glargine Injectable (LANTUS) 10 Unit(s) SubCutaneous every morning  insulin lispro (ADMELOG) corrective regimen sliding scale   SubCutaneous every 6 hours  methylPREDNISolone sodium succinate Injectable 40 milliGRAM(s) IV Push every 8 hours  nicotine -  14 mG/24Hr(s) Patch 1 patch Transdermal daily  pantoprazole  Injectable 40 milliGRAM(s) IV Push at bedtime  propofol Infusion 15 MICROgram(s)/kG/Min IV Continuous <Continuous>    Drug Dosing Weight  Height (cm): 172.7 (24 Mar 2022 22:42)  Weight (kg): 90.7 (18 Mar 2022 10:31)  BMI (kg/m2): 30.4 (24 Mar 2022 22:42)  BSA (m2): 2.04 (24 Mar 2022 22:42)    PHYSICAL EXAM:  GENERAL: NAD  EYES: EOMI, PERRLA  NECK: Supple, No JVD; Normal thyroid; Trachea midline: No LAD   NERVOUS SYSTEM:  Alert & Oriented X3,  Motor Strength 5/5 B/L upper and lower extremities; DTRs 2+ intact and symmetric  CHEST/LUNG: No rales, rhonchi, wheezing, breath sounds present bilaterally  HEART: Regular rate and rhythm; No murmurs, no gallops  ABDOMEN: Soft, Nontender, Nondistended; Bowel sounds present, no pain or masses on palpation  : voiding well, Amor in place  EXTREMITIES:  2+ Peripheral Pulses, No clubbing, cyanosis, or edema  SKIN: warm, intact, no lesions     LINES/DRAINS/DEVICES  CENTRAL LINE: [ ] YES [ ] NO  LOCATION:     AMOR: [ ] YES [ ] NO     A-LINE:  [ ] YES [ ] NO  LOCATION:       ICU Vital Signs Last 24 Hrs  T(C): 37.1 (25 Mar 2022 06:09), Max: 37.3 (24 Mar 2022 22:42)  T(F): 98.7 (25 Mar 2022 06:09), Max: 99.2 (24 Mar 2022 22:42)  HR: 106 (25 Mar 2022 11:00) (84 - 119)  BP: 135/75 (25 Mar 2022 11:00) (66/39 - 135/75)  BP(mean): 88 (25 Mar 2022 11:00) (46 - 88)  ABP: --  ABP(mean): --  RR: 24 (25 Mar 2022 11:00) (12 - 25)  SpO2: 95% (25 Mar 2022 11:00) (92% - 100%)      ABG - ( 25 Mar 2022 03:35 )  pH, Arterial: 7.49  pH, Blood: x     /  pCO2: 59    /  pO2: 377   / HCO3: 45    / Base Excess: 18.3  /  SaO2: 100                    @ 07:01  -   @ 07:00  --------------------------------------------------------  IN: 43.4 mL / OUT: 100 mL / NET: -56.6 mL        Mode: PS (Pressure Support)/ Spontaneous  FiO2: 35  PEEP: 5  ITime: 1  MAP: 7  PIP: 13        LABS:  CBC Full  -  ( 25 Mar 2022 06:00 )  WBC Count : 13.14 K/uL  RBC Count : 3.90 M/uL  Hemoglobin : 11.1 g/dL  Hematocrit : 37.1 %  Platelet Count - Automated : 153 K/uL  Mean Cell Volume : 95.1 fl  Mean Cell Hemoglobin : 28.5 pg  Mean Cell Hemoglobin Concentration : 29.9 gm/dL  Auto Neutrophil # : 11.69 K/uL  Auto Lymphocyte # : 1.01 K/uL  Auto Monocyte # : 0.33 K/uL  Auto Eosinophil # : 0.03 K/uL  Auto Basophil # : 0.01 K/uL  Auto Neutrophil % : 89.0 %  Auto Lymphocyte % : 7.7 %  Auto Monocyte % : 2.5 %  Auto Eosinophil % : 0.2 %  Auto Basophil % : 0.1 %        138  |  93<L>  |  26<H>  ----------------------------<  167<H>  5.1   |  44<H>  |  0.71    Ca    8.6      25 Mar 2022 06:00  Phos  2.3       Mg     2.0         TPro  5.6<L>  /  Alb  2.8<L>  /  TBili  0.4  /  DBili  x   /  AST  18  /  ALT  29  /  AlkPhos  45  25    PT/INR - ( 24 Mar 2022 23:09 )   PT: 11.5 sec;   INR: 0.97 ratio         PTT - ( 24 Mar 2022 23:09 )  PTT:29.3 sec  Urinalysis Basic - ( 25 Mar 2022 02:07 )    Color: Yellow / Appearance: Clear / S.020 / pH: x  Gluc: x / Ketone: Negative  / Bili: Negative / Urobili: Negative   Blood: x / Protein: 30 mg/dL / Nitrite: Negative   Leuk Esterase: Trace / RBC: 0-2 /HPF / WBC 3-5 /HPF   Sq Epi: x / Non Sq Epi: Few /HPF / Bacteria: Moderate /HPF          RADIOLOGY & ADDITIONAL STUDIES REVIEWED DURING TEAM ROUNDS    [ ]GOALS OF CARE DISCUSSION WITH PATIENT/FAMILY/PROXY:    CRITICAL CARE TIME SPENT: 35 minutes

## 2022-03-25 NOTE — H&P ADULT - ATTENDING COMMENTS
Patient seen and examined. Data reviewed. Case and plan of care discussed with ICU resident and agree with note.  This is 61M with multiple comorbid states intubated for acute hypercapnic respiratory failure from COPD exacerbation. Accepted to ICU for further management. Continue mechanical ventilation, monitor ABG, short intubation course anticipated. Condition: Critical, Prognosis: Good.

## 2022-03-25 NOTE — PROGRESS NOTE ADULT - ASSESSMENT
61 year old man from [facility], active smoker, with a PMHx of CAD, CHF on nocturnal BIPAP, COPD (history of multiple intubation in past), peripheral neuropathy, GERD, HTN, HLD, obesity, polyarthritis, Pulmonary HTN, TIA, Vit D Def, and recent admission for COPD exacerbation, re-presented for acute on chronic respiratory failure with hypoxia and hypercapnia. Failed BiPAP in ED and was subsequently intubated and transferred to ICU.     Assessment:  - Acute hypoxic hypercapnic respiratory failure   - COPD exacerbation    - Respiratory acidosis   - HTN  - HLD  - CAD   - DM type 2  - GERD  - Pulmonary HTN      Plan:  Neuro:  #Sedation  - On Propofol and Fentanyl for pain and sedation while intubated.   - Wean as tolerated to prepare for extubation   - Currently A+O and able to write on whiteboard.   #Anxiety  - On Xanax in NH. This could be contributory to hypercapnia, would avoid if possible.     Cardiovascular:  #CAD  - History of CAD with stent in 2017 as per chart  - Continue with aspirin and statin  - Hemodynamically stable off all vasopressors.     Pulmonary:   #Acute on Chronic Hypoxic Hypercapnic Respiratory Failure   #COPD Exacerbation  - Likely related to COPD exacerbation.   - Presented to ED with profound hypercapnia and lethargic, no improvement with NIPPV, leading to intubation.   - Post intubation ABGs with improvement in CO2.   - Volume AC: 450/16/5/35%. PS 8/5/35% as tolerated.  - With resolution of hypercapnia, will wean to extubate.   - On tapering dose of prednisone at home from previous hospitalization, re-started on Solumedrol 40 IVq8 for now.   - Continue Nebs.   - Chest x-ray comparable to previous, questionable right lobar consolidation.    Gastrointestinal:  #Nutrition  - Tube feeds  - Bowel regimen.     Infectious Diseases:  #No Acute Issues  - Patient noted with leukocytosis which is likely reactive due to steroids  - No documented fevers or other infectious indicators.     Renal:  #Acute on Chronic Respiratory Acidosis with Compensated Metabolic Alkalosis  - Chronically hypercapnic related to COPD with compensatory elevation to Bicarb.   - pH trending alkalotic after ventilator will adjust settings and monitor ABG.     Heme/Onc:   #VTE  -   - elevated wbc likley 2/2 steroid    Endo:   #DM  - A1c 7.4  will start the patient on basal bolus.  adjust insulin as indicated. goal -180.      Skin/ catheter:   - no acute issues     Prophylaxis:   - lovenox for DVT prophylaxis  - PPi while intubated andon steroids     Goals of Care:   - Full Code     Dispo: Transfer to ICU  61 year old man from [facility], active smoker, with a PMHx of CAD, CHF on nocturnal BIPAP, COPD (history of multiple intubation in past), peripheral neuropathy, GERD, HTN, HLD, obesity, polyarthritis, Pulmonary HTN, TIA, Vit D Def, and recent admission for COPD exacerbation, re-presented for acute on chronic respiratory failure with hypoxia and hypercapnia. Failed BiPAP in ED and was subsequently intubated and transferred to ICU.     Assessment:  - Acute hypoxic hypercapnic respiratory failure   - COPD exacerbation    - Respiratory acidosis   - HTN  - HLD  - CAD   - DM type 2  - GERD  - Pulmonary HTN      Plan:  Neuro:  #Sedation  - On Propofol and Fentanyl for pain and sedation while intubated.   - Wean as tolerated to prepare for extubation   - Currently A+O and able to write on whiteboard.   #Anxiety  - On Xanax in NH. This could be contributory to hypercapnia, would avoid if possible.     Cardiovascular:  #CAD  - History of CAD with stent in 2017 as per chart  - Continue with aspirin and statin  - Hemodynamically stable off all vasopressors.     Pulmonary:   #Acute on Chronic Hypoxic Hypercapnic Respiratory Failure   #COPD Exacerbation  - Likely related to COPD exacerbation.   - Presented to ED with profound hypercapnia and lethargic, no improvement with NIPPV, leading to intubation.   - Post intubation ABGs with improvement in CO2.   - Volume AC: 450/16/5/35%. PS 8/5/35% as tolerated.  - With resolution of hypercapnia, will wean to extubate.   - On tapering dose of prednisone at home from previous hospitalization, re-started on Solumedrol 40 IVq8 for now.   - Continue Nebs.   - Chest x-ray comparable to previous, questionable right lobar consolidation.  - Given mutiple hospitalizations this week and history of intubations, very high risk for re-intubation. Broached topic of tracheostomy     Gastrointestinal:  #Nutrition  - Tube feeds  - Bowel regimen.     Infectious Diseases:  #No Acute Issues  - Patient noted with leukocytosis which is likely reactive due to steroids  - No documented fevers or other infectious indicators.     Renal:  #Acute on Chronic Respiratory Acidosis with Compensated Metabolic Alkalosis  - Chronically hypercapnic related to COPD with compensatory elevation to Bicarb.   - pH trending alkalotic after ventilator will adjust settings and monitor ABG.     Heme/Onc:   #VTE  - Lovenox VTE dosing.   - Continue Aspirin.     Endo:   #DM  - A1c 7.4  - Will start the patient on basal bolus. Adjust insulin as indicated.    Skin/ catheter:   - Tejada (3/25)     Prophylaxis:   - Lovenox for DVT prophylaxis  - PPI while intubated and on steroids     Goals of Care:   - Full Code     Dispo:   - Maintain in ICU

## 2022-03-25 NOTE — H&P ADULT - ASSESSMENT
Patient is a 61 year old male, from [place], active smoker PMHx of CAD, ?CHF, COPD, peripheral neuropathy, GERD, HTN, HLD, obesity, polyarthritis, Pulmonary HTN, came with chief complain of shortness of breath. ICU was consulted for increased worsening hypoxia and increase work of breathing    Assessment:  - Acute hypoxic hypercapnic respiratory failure   - COPD exacerbation    - Respiratory acidosis   - HTN  - HLD  - CAD   - DM type 2  - GERD  - Pulmonary HTN             Plan:  Neuro:  - no acute issues   -Continue xanax home med      Cardiovascular:  #CAD  Patient with hx of CAD s/p stent in 2017 as per chart  Will continue with aspirin and statin      #Pulmonary:    Acute hypoxic hypercapnic respiratory failure likely due to COPD exacerbation    Patient presented due to SOB    patient noted to be tachypneic in ED and placed on BiPAP   continue with Solumedorl 40 IV.   CXR showed b/l vasilar    monitor O2 sat    Albuterol and Symbicort.      #Respiratory acidosis   - noted to have CO2 more 125 on ABG   - patient appears to be chronic retainer normally with CO2 in 40s   - placed on BiPAP with improvement in pH  - continue with BiPAP for now  - repeat ABG in AM     #Infectious Diseases:  - Patient noted with leukocytosis  will continue with Zithromax for COPD    #Gastrointestinal:  - will keep NPO for now while on BiPAP    #Renal:  no acute issues  monitor electrolytes    #Heme/onc:   - no acute issues     Endo:   #DM  - A1c 7.4  will start the patient on basal bolus.  adjust insulin as indicated. goal -180.      Skin/ catheter:   - no acute issues     Prophylaxis:   - lovenox for DVT prophylaxis    Goals of Care:   - Full Code     Dispo: Transfer to ICU  61 year old man from [facility], active smoker, with a PMHx of CAD, CHF on nocturnal BIPAP, COPD (history of multiple intubation in past), peripheral neuropathy, GERD, HTN, HLD, obesity, polyarthritis, Pulmonary HTN, TIA, Vit D Def, re-presented for acute on chronic respiratory failure with hypoxia and hypercapnia.  was placed on Bi-PAP in ED for ~2hours, with no improvement in mental status and blood gas , Patient intubated in ED on 3/25/2022 and transferred to [facility] for close monitoring     Assessment:  - Acute hypoxic hypercapnic respiratory failure   - COPD exacerbation    - Respiratory acidosis   - HTN  - HLD  - CAD   - DM type 2  - GERD  - Pulmonary HTN             Plan:  Neuro:  - Hypercapnic encephalopathic   - currently sedated on propofol , pt intubated   - On Xanax ,       Cardiovascular:  #CAD  Patient with hx of CAD s/p stent in 2017 as per chart  Will continue with aspirin and statin      #Pulmonary:    Acute hypoxic hypercapnic respiratory failure likely due to COPD exacerbation    Patient presented due to SOB    patient noted to be tachypneic in ED and placed on BiPAP   continue with Solumedorl 40 IV.   CXR showed b/l vasilar    monitor O2 sat    Albuterol and Symbicort.      #Respiratory acidosis   - noted to have CO2 more 125 on ABG   - patient appears to be chronic retainer normally with CO2 in 40s   - placed on BiPAP with improvement in pH  - continue with BiPAP for now  - repeat ABG in AM     #Infectious Diseases:  - Patient noted with leukocytosis  will continue with Zithromax for COPD    #Gastrointestinal:  - will keep NPO for now while on BiPAP    #Renal:  no acute issues  monitor electrolytes    #Heme/onc:   - no acute issues     Endo:   #DM  - A1c 7.4  will start the patient on basal bolus.  adjust insulin as indicated. goal -180.      Skin/ catheter:   - no acute issues     Prophylaxis:   - lovenox for DVT prophylaxis    Goals of Care:   - Full Code     Dispo: Transfer to ICU  61 year old man from [facility], active smoker, with a PMHx of CAD, CHF on nocturnal BIPAP, COPD (history of multiple intubation in past), peripheral neuropathy, GERD, HTN, HLD, obesity, polyarthritis, Pulmonary HTN, TIA, Vit D Def, re-presented for acute on chronic respiratory failure with hypoxia and hypercapnia.  was placed on Bi-PAP in ED for ~2hours, with no improvement in mental status and blood gas , Patient intubated in ED on 3/25/2022 and transferred to [facility] for close monitoring     Assessment:  - Acute hypoxic hypercapnic respiratory failure   - COPD exacerbation    - Respiratory acidosis   - HTN  - HLD  - CAD   - DM type 2  - GERD  - Pulmonary HTN             Plan:  Neuro:  - Hypercapnic encephalopathic   - currently sedated on propofol , pt intubated   - On Xanax ,       Cardiovascular:  #CAD  Patient with hx of CAD s/p stent in 2017 as per chart  Will continue with aspirin and statin      #Pulmonary:    Acute hypoxic hypercapnic respiratory failure likely due to COPD exacerbation   ABG:  Acute on chronic respiratory acidosis with metabolic compensation, bicarb elevated at baseline , pt is chronic retainer ( baseline Bicarb seems to be ~38 , Baseline pco2 69, compensated chronic respiratory acidosis at baseline)  presented with weakness and AMS   patient noted to be hypercapnic (pCO2 >125) in ED and placed on BiPAP for 1.5 to 2 hours without improvement   s/p Intubation  in ED   patient on tapering dose of prednisone at home,  will start on Solumedrol 40 IVq8 hrs for now.   CXr :mild pulmonary vascular congestion   Monitor O2 sat , keep above 88%  Duoneb and Symbicort  - repeat ABG post intubation       #Respiratory acidosis   - noted to have CO2 more 125 on ABG   - patient appears to be chronic retainer normally with Bicarb in 40s       #Infectious Diseases:  - Patient noted with leukocytosis  likely reactive due to steriod    #Gastrointestinal:  - NGT confirmed   - NPO except meds for now      #Renal:  no acute issues   metabolic compensation for acute /chronic respiratory acidosis   monitor electrolytes    #Heme/onc:   - elevated wbc likley 2/2 steroid    Endo:   #DM  - A1c 7.4  will start the patient on basal bolus.  adjust insulin as indicated. goal -180.      Skin/ catheter:   - no acute issues     Prophylaxis:   - lovenox for DVT prophylaxis  - PPi while intubated andon steroids     Goals of Care:   - Full Code     Dispo: Transfer to ICU

## 2022-03-25 NOTE — PATIENT PROFILE ADULT - FALL HARM RISK - HARM RISK INTERVENTIONS
Bed in lowest position, wheels locked, appropriate side rails in place/Call bell, personal items and telephone in reach/Instruct patient to call for assistance before getting out of bed or chair/Non-slip footwear when patient is out of bed/[place] to call system/Physically safe environment - no spills, clutter or unnecessary equipment/Purposeful Proactive Rounding/Room/bathroom lighting operational, light cord in reach/Unable to comprehend

## 2022-03-25 NOTE — CHART NOTE - NSCHARTNOTEFT_GEN_A_CORE
Spoke with patient's sister and had an extensive conversation regarding the patient's current status, diagnostics, treatment plan, and overall prognosis. All questions answered.

## 2022-03-25 NOTE — CHART NOTE - NSCHARTNOTEFT_GEN_A_CORE
ICU transfer Note    61 year old man from [facility], active smoker, with a PMHx of CAD, CHF on nocturnal BIPAP, COPD (intubation in past), peripheral neuropathy, GERD, HTN, HLD, obesity, polyarthritis, Pulmonary HTN, TIA, Vit D Def, and recent admission to [facility] for respiratory failure requiring intubation (3/9 - 15 and again on 3/18 - 21), who re-presented to [facility] on 3/24 for acute on chronic respiratory failure with hypoxia and hypercapnia. In the ED he was obtunded, with CO2 greater than 125. Trialed on BiPAP without improvement, and subsequently intubated and transfered to ICU. In ICU he was re-started on COPD medications, quickly weaned off ventilator and extubated. Consults placed with PT and social work.  Collateral information obtained from assisted living, current episode likely triggered by smoking and/or non-adherence to medications.     Follow up:  Monitor breathing status  BIPAP qHS  reinforce quitting smoking    Discussed with NP Patient will be transfered to floor. Discussed with ICU attending. Patient clinically stable for downgrade ICU transfer Note    61 year old man from [facility], active smoker, with a PMHx of CAD, CHF on nocturnal BIPAP, COPD (intubation in past), peripheral neuropathy, GERD, HTN, HLD, obesity, polyarthritis, Pulmonary HTN, TIA, Vit D Def, and recent admission to [facility] for respiratory failure requiring intubation (3/9 - 15 and again on 3/18 - 21), who re-presented to [facility] on 3/24 for acute on chronic respiratory failure with hypoxia and hypercapnia. In the ED he was obtunded, with CO2 greater than 125. Trialed on BiPAP without improvement, and subsequently intubated and transfered to ICU. In ICU he was re-started on COPD medications, quickly weaned off ventilator and extubated. Consults placed with PT and social work.  Collateral information obtained from assisted living, current episode likely triggered by smoking and/or non-adherence to medications.     Follow up:  Monitor breathing status  BIPAP qHS  reinforce quitting smoking    Discussed with NP Kaela Patient will be transfered to floor. Discussed with ICU attending. Patient clinically stable for downgrade ICU transfer Note    61 year old man from [facility], active smoker, with a PMHx of CAD, CHF on nocturnal BIPAP, COPD (intubation in past), peripheral neuropathy, GERD, HTN, HLD, obesity, polyarthritis, Pulmonary HTN, TIA, Vit D Def, and recent admission to [facility] for respiratory failure requiring intubation (3/9 - 15 and again on 3/18 - 21), who re-presented to [facility] on 3/24 for acute on chronic respiratory failure with hypoxia and hypercapnia. In the ED he was obtunded, with CO2 greater than 125. Trialed on BiPAP without improvement, and subsequently intubated and transfered to ICU. In ICU he was re-started on COPD medications, quickly weaned off ventilator and extubated. Consults placed with PT and social work. Collateral information obtained from assisted living, current episode likely triggered by smoking and/or non-adherence to medications. Tejada removed 3/25. Bladder scan at 10pm shows 450cc retention. Pt reports wanting to urinate.    Follow up:  Monitor breathing status  BIPAP qHS  reinforce quitting smoking  Bladder scan at 12mn if does not void, straight catheterization if bladder scan >500cc      Discussed with DEBBIE Sherwood Patient will be transfered to floor. Discussed with ICU attending. Patient clinically stable for downgrade

## 2022-03-25 NOTE — H&P ADULT - HISTORY OF PRESENT ILLNESS
61 year old Male, from assisted living  and medical history of CAD, CHF on BIPAP at NH, COPD (intubated ,later extubated a week ago), peripheral neuropathy, GERD, HTN, HLD, obesity, polyarthritis, Pulmonary HTN,  was brought in by EMS for shortness of breath. Patient was discharged from hospital, he states he started smoking again at his assisted living, this morning he has so much work of breathing so was sent to ER for further assessment. Patient denied having any chest pain, palpitations fever, nausea vomiting diarrhea or any other complains.   61 year old man from [facility], active smoker, with a PMHx of CAD, CHF on nocturnal BIPAP, COPD (history of multiple intubation in past), peripheral neuropathy, GERD, HTN, HLD, obesity, polyarthritis, Pulmonary HTN, TIA, Vit D Def, and recent readmissionX2 to [facility] for respiratory failure requiring intubation ( 3/9 - 3/15, and ,most recent: 3/18-3/21), re-presented for acute on chronic respiratory failure with hypoxia and hypercapnia. History was limited to NH chart note review , coming in for weakness/AMS as per EMS. pt was not providing any further history. patient was placed on Bi-PAP in ED for ~2hours for acute on chronic hypoxic [id number] respiratory failure , with no improvement in mental status and blood gas , Patient intubated in ED on 3/25/2022 @~1am , right lip 23cm. pt received Bolus NS X2 for soft blood pressure s/p intubation. ICU consulted for close monitoring.  redness below knee. Multiple skin bruise discoloration     Of note : patient is known for being non adherent with his BiPAP       61 year old man from [facility], active smoker, with a PMHx of CAD, CHF on nocturnal BIPAP, COPD (history of multiple intubation in past), peripheral neuropathy, GERD, HTN, HLD, obesity, polyarthritis, Pulmonary HTN, TIA, Vit D Def, and recent readmissionX2 to [facility] for respiratory failure requiring intubation ( 3/9 - 3/15, and ,most recent: 3/18-3/21), re-presented for acute on chronic respiratory failure with hypoxia and hypercapnia. History was limited to NH chart note review , coming in for weakness/AMS as per EMS. pt was not providing any further history. patient was placed on Bi-PAP in ED for ~2hours for acute on chronic hypoxic [id number] respiratory failure , with no improvement in mental status and blood gas , Patient intubated in ED on 3/25/2022 @~1am , right lip 23cm. pt received Bolus NS X2 for soft blood pressure s/p intubation. ICU consulted for close monitoring.      Of note : patient is known for being non adherent with his BiPAP

## 2022-03-25 NOTE — CONSULT NOTE ADULT - SUBJECTIVE AND OBJECTIVE BOX
`HPI:  61 year old man from [facility], active smoker, with a PMHx of CAD, CHF on nocturnal BIPAP, COPD (history of multiple intubation in past), peripheral neuropathy, GERD, HTN, HLD, obesity, polyarthritis, Pulmonary HTN, TIA, Vit D Def, and recent readmissionX2 to [facility] for respiratory failure requiring intubation ( 3/9 - 3/15, and ,most recent: 3/18-3/21), re-presented for acute on chronic respiratory failure with hypoxia and hypercapnia. History was limited to NH chart note review , coming in for weakness/AMS as per EMS. pt was not providing any further history. patient was placed on Bi-PAP in ED for ~2hours for acute on chronic hypoxic [id number] respiratory failure , with no improvement in mental status and blood gas , Patient intubated in ED on 3/25/2022 @~1am , right lip 23cm. pt received Bolus NS X2 for soft blood pressure s/p intubation. ICU consulted for close monitoring.      Of note : patient is known for being non adherent with his BiPAP       (25 Mar 2022 01:39)      PAST MEDICAL & SURGICAL HISTORY:  COPD (chronic obstructive pulmonary disease)  Oxygen 2-3L at home    Stented coronary artery  2017    HLD (hyperlipidemia)    Pulmonary HTN    Type 2 diabetes mellitus without complication, with long-term current use of insulin    Coronary artery disease involving native coronary artery of native heart without angina pectoris    Smoker    Gastroesophageal reflux disease, esophagitis presence not specified    Oxygen dependent    DM (diabetes mellitus)    CAD (coronary artery disease)    GERD (gastroesophageal reflux disease)    Insomnia    CHF (congestive heart failure)    HTN (hypertension)    Mood disorder    No significant past surgical history        No Known Allergies      Social Hx:    FAMILY HISTORY:  No family history of chronic obstructive pulmonary disease    No family history of hypertension    No family history of mental disorder    FH: myocardial infarction (Mother)        ALBUTerol    90 MICROgram(s) HFA Inhaler 2 Puff(s) Inhalation every 6 hours  aspirin  chewable 81 milliGRAM(s) Oral daily  atorvastatin 40 milliGRAM(s) Oral at bedtime  budesonide 160 MICROgram(s)/formoterol 4.5 MICROgram(s) Inhaler 2 Puff(s) Inhalation two times a day  chlorhexidine 0.12% Liquid 15 milliLiter(s) Oral Mucosa every 12 hours  chlorhexidine 2% Cloths 1 Application(s) Topical <User Schedule>  enoxaparin Injectable 40 milliGRAM(s) SubCutaneous every 24 hours  gabapentin 100 milliGRAM(s) Oral three times a day  glucagon  Injectable 1 milliGRAM(s) IntraMuscular once  insulin glargine Injectable (LANTUS) 10 Unit(s) SubCutaneous every morning  insulin lispro (ADMELOG) corrective regimen sliding scale   SubCutaneous Before meals and at bedtime  methylPREDNISolone sodium succinate Injectable 40 milliGRAM(s) IV Push every 8 hours  nicotine - 21 mG/24Hr(s) Patch 1 patch Transdermal daily  pantoprazole  Injectable 40 milliGRAM(s) IV Push at bedtime  tiotropium 18 MICROgram(s) Capsule 1 Capsule(s) Inhalation daily      MEDICATIONS  (PRN):      T(C): 37.1 (03-25-22 @ 17:10), Max: 37.3 (03-24-22 @ 22:42)  HR: 87 (03-25-22 @ 18:00) (81 - 119)  BP: 111/61 (03-25-22 @ 18:00) (66/39 - 135/75)  RR: 19 (03-25-22 @ 18:00) (12 - 41)  SpO2: 93% (03-25-22 @ 18:00) (91% - 100%)        REVIEW OF SYSTEMS:                           ALL ROS DONE [ X   ]    CONSTITUTIONAL:  LETHARGIC [   ], FEVER [   ], UNRESPONSIVE [   ]  CVS:  CP  [   ], SOB, [   ], PALPITATIONS [   ], DIZZYNESS [   ]  RS: COUGH [   ], SPUTUM [   ]  GI: ABDOMINAL PAIN [   ], NAUSEA [   ], VOMITINGS [   ], DIARRHEA [   ], CONSTIPATION [   ]  :  DYSURIA [   ], NOCTURIA [   ], INCREASED FREQUENCY [   ], DRIBLING [   ],  SKELETAL: PAINFUL JOINTS [   ], SWOLLEN JOINTS [   ], NECK ACHE [   ], LOW BACK ACHE [   ],  SKIN : ULCERS [   ], RASH [   ], ITCHING [   ]  CNS: HEAD ACHE [   ], DOUBLE VISION [   ], BLURRED VISION [   ], AMS / CONFUSION [   ], SEIZURES [   ], WEAKNESS [   ],TINGLING / NUMBNESS [   ]    PHYSICAL EXAMINATION:  GENERAL APPEARANCE: NO DISTRESS  HEENT:  NO PALLOR, NO  JVD,  NO   NODES, NECK SUPPLE  CVS: S1 +, S2 +,   RS: AEEB,  OCCASIONAL  RALES +,  RONCHI +  ABD: SOFT, NT, NO, BS +  EXT: NO PE  SKIN: WARM,   SKELETAL:  ROM ACCEPTABLE  CNS:  AAO X 3    RADIOLOGY :    ACC: [id number] EXAM:  XR CHEST PORTABLE IMMED 1V                          PROCEDURE DATE:  03/25/2022          INTERPRETATION:  AP chest on March 25, 2022 at 2:02 AM. Patient was   intubated.    Heart magnified by technique.    Right lateral chest is excluded from the study.    Minimal infiltrate at right base somewhat increased from March 24.    At endotracheal tube and nasogastric tube in mid inserted.    IMPRESSION: Minimal right base infiltrate. Tubes inserted.      ASSESSMENT :       PLAN:  HPI:  61 year old man from [facility], active smoker, with a PMHx of CAD, CHF on nocturnal BIPAP, COPD (history of multiple intubation in past), peripheral neuropathy, GERD, HTN, HLD, obesity, polyarthritis, Pulmonary HTN, TIA, Vit D Def, and recent readmissionX2 to [facility] for respiratory failure requiring intubation ( 3/9 - 3/15, and ,most recent: 3/18-3/21), re-presented for acute on chronic respiratory failure with hypoxia and hypercapnia. History was limited to NH chart note review , coming in for weakness/AMS as per EMS. pt was not providing any further history. patient was placed on Bi-PAP in ED for ~2hours for acute on chronic hypoxic [id number] respiratory failure , with no improvement in mental status and blood gas , Patient intubated in ED on 3/25/2022 @~1am , right lip 23cm. pt received Bolus NS X2 for soft blood pressure s/p intubation. ICU consulted for close monitoring.      Of note : patient is known for being non adherent with his BiPAP       (25 Mar 2022 01:39)    # COPD EXACERBATION, ACUTE ON CHRONIC HYPOXIC & HYPERCAPNOEIC RESPIRATORY FAILURE S/T NONCOMPLIANCE W/ TOBACCO CESSATION AND W/ BIPAP   - S/P MECHANICAL VENTILATION  - STARTED ON IV STEROIDS [TAPERING], SYMBICORT, SPIRIVA, AZITHROMYCIN, NEBS,  O2 SUPPLEMENTS  - NICOTINE PATCH  - CRITICAL CARE EVALUATION IN PROGRESS    - COUNSELLED PATIENT AT LENGTH, DISCUSSED REGARDING NONCOMPLIANCE W/ SMOKING CESSATION. PATIENT IS HIGH RISK FOR RE-INTUBATION. D/W PATIENT THAT HE IS HIGH RISK FOR RESPIRATORY FAILURE. HE VERBALIZED UNDERSTANDING. HE EXPRESSED THAT HE WOULD ATTEMPT TO ABSTAIN FROM SMOKING AND UTILIZE NICOTINE PATCHES AND WOULD TRY TO REMAIN COMPLIANT WITH NOCTURNAL BIPAP.      # UNCONTROLLED DM, GLUCOSE INTOLERANCE DUE TO STEROIDS  -  LANTUS, SSI + FS     # SMOKER, COUNSELLED TO QUIT SMOKING     # GI AND DVT PROPHYLAXIS

## 2022-03-25 NOTE — PROGRESS NOTE ADULT - ATTENDING COMMENTS
61 year old man from [facility], active smoker, with a PMHx of CAD, CHF on nocturnal BIPAP, COPD (history of multiple intubation in past), peripheral neuropathy, GERD, HTN, HLD, obesity, polyarthritis, Pulmonary HTN, TIA, Vit D Def, and recent admission for COPD exacerbation, re-presented for acute on chronic respiratory failure with hypoxia and hypercapnia. Failed BiPAP in ED and was subsequently intubated and transferred to ICU.     Assessment:  - Acute hypoxic hypercapnic respiratory failure   - COPD exacerbation    - Respiratory acidosis   - HTN  - HLD  - CAD   - DM type 2  - GERD  - Pulmonary HTN        Plan   - Adjust sedation for SBT / SAT   - Pat was extubated to BiPaP   - Pat had multiple intubation due to COPD   - Will need trach in future   - Continue Steroid  - Symbicort   - Advise to stop smoking   - Monitor blood sugar with coverage   - PPI   - DVT GI prophy

## 2022-03-25 NOTE — H&P ADULT - NSHPPHYSICALEXAM_GEN_ALL_CORE
ICU Vital Signs Last 24 Hrs  T(C): 37.6 (18 Mar 2022 10:31), Max: 37.6 (18 Mar 2022 10:31)  T(F): 99.6 (18 Mar 2022 10:31), Max: 99.6 (18 Mar 2022 10:31)  HR: 127 (18 Mar 2022 12:16) (107 - 127)  BP: 120/75 (18 Mar 2022 10:31) (120/75 - 120/75)  BP(mean): --  ABP: --  ABP(mean): --  RR: 19 (18 Mar 2022 10:31) (19 - 19)  SpO2: 94% (18 Mar 2022 12:16) (93% - 94%)      GENERAL:  Patient noted to be in respiratory distress  HEAD:  Atraumatic, Normocephalic  EYES: EOMI, PERRLA, conjunctiva and sclera clear  NECK: Supple, No JVD  CHEST/LUNG: Clear to auscultation bilaterally; No wheeze; No crackles; accessory muscle use noted  HEART: Regular rate and rhythm; No murmurs;   ABDOMEN: Soft, Nontender, Nondistended; Bowel sounds present; No guarding  EXTREMITIES:  2+ Peripheral Pulses, No cyanosis or edema  PSYCH:  Normal Affect  NEUROLOGY: non-focal, AAO X 3. Strength is 5/5. no sensory loss.  SKIN: No rashes or lesions ICU Vital Signs Last 24 Hrs  T(C): 36.8 (24 Mar 2022 23:41), Max: 37.3 (24 Mar 2022 22:42)  T(F): 98.3 (24 Mar 2022 23:41), Max: 99.2 (24 Mar 2022 22:42)  HR: 98 (25 Mar 2022 01:48) (98 - 119)  BP: 108/65 (25 Mar 2022 01:48) (88/67 - 108/65)  BP(mean): --  ABP: --  ABP(mean): --  RR: 17 (25 Mar 2022 01:48) (17 - 20)  SpO2: 100% (25 Mar 2022 01:48) (97% - 100%)      GENERAL:  intubated , awake   HEAD:  Atraumatic, Normocephalic  EYES: EOMI, PERRLA, conjunctiva and sclera clear  NECK: Supple, No JVD  CHEST/LUNG: bilateral rhonchi   HEART:  [id number]  ABDOMEN: Soft, Nontender, Nondistended; Bowel sounds present; No guarding  EXTREMITIES:  2+ Peripheral Pulses, No cyanosis or edema  PSYCH:  -----  NEUROLOGY: opens eyes to verbal stimuli but doesn't answer questioning.  SKIN: No rashes or lesions ICU Vital Signs Last 24 Hrs  T(C): 36.8 (24 Mar 2022 23:41), Max: 37.3 (24 Mar 2022 22:42)  T(F): 98.3 (24 Mar 2022 23:41), Max: 99.2 (24 Mar 2022 22:42)  HR: 98 (25 Mar 2022 01:48) (98 - 119)  BP: 108/65 (25 Mar 2022 01:48) (88/67 - 108/65)  BP(mean): --  ABP: --  ABP(mean): --  RR: 17 (25 Mar 2022 01:48) (17 - 20)  SpO2: 100% (25 Mar 2022 01:48) (97% - 100%)      GENERAL:  intubated , awake   HEAD:  Atraumatic, Normocephalic  EYES: EOMI, PERRLA, conjunctiva and sclera clear  NECK: Supple, No JVD  CHEST/LUNG: bilateral rhonchi   HEART:  [id number]  ABDOMEN: Soft, Nontender, Nondistended; Bowel sounds present; No guarding  EXTREMITIES:  2+ Peripheral Pulses, No cyanosis or edema  PSYCH:  -----  NEUROLOGY: opens eyes to verbal stimuli but doesn't answer questioning.  SKIN: redness below knee. Multiple skin bruise discoloration

## 2022-03-26 ENCOUNTER — TRANSCRIPTION ENCOUNTER (OUTPATIENT)
Age: 62
End: 2022-03-26

## 2022-03-26 DIAGNOSIS — I27.20 PULMONARY HYPERTENSION, UNSPECIFIED: ICD-10-CM

## 2022-03-26 DIAGNOSIS — Z99.81 DEPENDENCE ON SUPPLEMENTAL OXYGEN: ICD-10-CM

## 2022-03-26 DIAGNOSIS — I10 ESSENTIAL (PRIMARY) HYPERTENSION: ICD-10-CM

## 2022-03-26 DIAGNOSIS — J44.9 CHRONIC OBSTRUCTIVE PULMONARY DISEASE, UNSPECIFIED: ICD-10-CM

## 2022-03-26 DIAGNOSIS — I25.10 ATHEROSCLEROTIC HEART DISEASE OF NATIVE CORONARY ARTERY WITHOUT ANGINA PECTORIS: ICD-10-CM

## 2022-03-26 DIAGNOSIS — F17.200 NICOTINE DEPENDENCE, UNSPECIFIED, UNCOMPLICATED: ICD-10-CM

## 2022-03-26 DIAGNOSIS — J96.21 ACUTE AND CHRONIC RESPIRATORY FAILURE WITH HYPOXIA: ICD-10-CM

## 2022-03-26 DIAGNOSIS — Z71.89 OTHER SPECIFIED COUNSELING: ICD-10-CM

## 2022-03-26 DIAGNOSIS — F39 UNSPECIFIED MOOD [AFFECTIVE] DISORDER: ICD-10-CM

## 2022-03-26 DIAGNOSIS — E11.9 TYPE 2 DIABETES MELLITUS WITHOUT COMPLICATIONS: ICD-10-CM

## 2022-03-26 DIAGNOSIS — Z91.19 PATIENT'S NONCOMPLIANCE WITH OTHER MEDICAL TREATMENT AND REGIMEN: ICD-10-CM

## 2022-03-26 LAB
ANION GAP SERPL CALC-SCNC: 2 MMOL/L — LOW (ref 5–17)
BASOPHILS # BLD AUTO: 0.01 K/UL — SIGNIFICANT CHANGE UP (ref 0–0.2)
BASOPHILS NFR BLD AUTO: 0.1 % — SIGNIFICANT CHANGE UP (ref 0–2)
BUN SERPL-MCNC: 22 MG/DL — HIGH (ref 7–18)
CALCIUM SERPL-MCNC: 8.4 MG/DL — SIGNIFICANT CHANGE UP (ref 8.4–10.5)
CHLORIDE SERPL-SCNC: 94 MMOL/L — LOW (ref 96–108)
CO2 SERPL-SCNC: 40 MMOL/L — HIGH (ref 22–31)
CREAT SERPL-MCNC: 0.59 MG/DL — SIGNIFICANT CHANGE UP (ref 0.5–1.3)
EGFR: 112 ML/MIN/1.73M2 — SIGNIFICANT CHANGE UP
EOSINOPHIL # BLD AUTO: 0 K/UL — SIGNIFICANT CHANGE UP (ref 0–0.5)
EOSINOPHIL NFR BLD AUTO: 0 % — SIGNIFICANT CHANGE UP (ref 0–6)
GLUCOSE SERPL-MCNC: 258 MG/DL — HIGH (ref 70–99)
HCT VFR BLD CALC: 35.3 % — LOW (ref 39–50)
HGB BLD-MCNC: 11 G/DL — LOW (ref 13–17)
IMM GRANULOCYTES NFR BLD AUTO: 0.8 % — SIGNIFICANT CHANGE UP (ref 0–1.5)
LYMPHOCYTES # BLD AUTO: 1.07 K/UL — SIGNIFICANT CHANGE UP (ref 1–3.3)
LYMPHOCYTES # BLD AUTO: 9.7 % — LOW (ref 13–44)
MCHC RBC-ENTMCNC: 28.8 PG — SIGNIFICANT CHANGE UP (ref 27–34)
MCHC RBC-ENTMCNC: 31.2 GM/DL — LOW (ref 32–36)
MCV RBC AUTO: 92.4 FL — SIGNIFICANT CHANGE UP (ref 80–100)
MONOCYTES # BLD AUTO: 0.38 K/UL — SIGNIFICANT CHANGE UP (ref 0–0.9)
MONOCYTES NFR BLD AUTO: 3.4 % — SIGNIFICANT CHANGE UP (ref 2–14)
NEUTROPHILS # BLD AUTO: 9.48 K/UL — HIGH (ref 1.8–7.4)
NEUTROPHILS NFR BLD AUTO: 86 % — HIGH (ref 43–77)
NRBC # BLD: 0 /100 WBCS — SIGNIFICANT CHANGE UP (ref 0–0)
PLATELET # BLD AUTO: 145 K/UL — LOW (ref 150–400)
POTASSIUM SERPL-MCNC: 4.8 MMOL/L — SIGNIFICANT CHANGE UP (ref 3.5–5.3)
POTASSIUM SERPL-SCNC: 4.8 MMOL/L — SIGNIFICANT CHANGE UP (ref 3.5–5.3)
RBC # BLD: 3.82 M/UL — LOW (ref 4.2–5.8)
RBC # FLD: 13.2 % — SIGNIFICANT CHANGE UP (ref 10.3–14.5)
SODIUM SERPL-SCNC: 136 MMOL/L — SIGNIFICANT CHANGE UP (ref 135–145)
WBC # BLD: 11.03 K/UL — HIGH (ref 3.8–10.5)
WBC # FLD AUTO: 11.03 K/UL — HIGH (ref 3.8–10.5)

## 2022-03-26 RX ORDER — POLYETHYLENE GLYCOL 3350 17 G/17G
17 POWDER, FOR SOLUTION ORAL DAILY
Refills: 0 | Status: DISCONTINUED | OUTPATIENT
Start: 2022-03-26 | End: 2022-03-31

## 2022-03-26 RX ORDER — ALPRAZOLAM 0.25 MG
0.5 TABLET ORAL EVERY 12 HOURS
Refills: 0 | Status: DISCONTINUED | OUTPATIENT
Start: 2022-03-26 | End: 2022-03-31

## 2022-03-26 RX ORDER — SENNA PLUS 8.6 MG/1
2 TABLET ORAL AT BEDTIME
Refills: 0 | Status: DISCONTINUED | OUTPATIENT
Start: 2022-03-26 | End: 2022-03-31

## 2022-03-26 RX ORDER — OXYCODONE AND ACETAMINOPHEN 5; 325 MG/1; MG/1
1 TABLET ORAL EVERY 6 HOURS
Refills: 0 | Status: DISCONTINUED | OUTPATIENT
Start: 2022-03-26 | End: 2022-03-31

## 2022-03-26 RX ADMIN — GABAPENTIN 100 MILLIGRAM(S): 400 CAPSULE ORAL at 13:54

## 2022-03-26 RX ADMIN — OXYCODONE AND ACETAMINOPHEN 1 TABLET(S): 5; 325 TABLET ORAL at 17:42

## 2022-03-26 RX ADMIN — Medication 1 PATCH: at 19:32

## 2022-03-26 RX ADMIN — Medication 5 MILLIGRAM(S): at 21:31

## 2022-03-26 RX ADMIN — Medication 0.5 MILLIGRAM(S): at 17:30

## 2022-03-26 RX ADMIN — Medication 1 PATCH: at 12:03

## 2022-03-26 RX ADMIN — Medication 4: at 17:22

## 2022-03-26 RX ADMIN — Medication 1 PATCH: at 12:00

## 2022-03-26 RX ADMIN — Medication 1 PATCH: at 00:20

## 2022-03-26 RX ADMIN — Medication 0.5 MILLIGRAM(S): at 12:24

## 2022-03-26 RX ADMIN — OXYCODONE AND ACETAMINOPHEN 1 TABLET(S): 5; 325 TABLET ORAL at 10:24

## 2022-03-26 RX ADMIN — GABAPENTIN 100 MILLIGRAM(S): 400 CAPSULE ORAL at 06:32

## 2022-03-26 RX ADMIN — Medication 40 MILLIGRAM(S): at 17:23

## 2022-03-26 RX ADMIN — OXYCODONE AND ACETAMINOPHEN 1 TABLET(S): 5; 325 TABLET ORAL at 11:24

## 2022-03-26 RX ADMIN — OXYCODONE AND ACETAMINOPHEN 1 TABLET(S): 5; 325 TABLET ORAL at 16:42

## 2022-03-26 RX ADMIN — Medication 4: at 12:03

## 2022-03-26 RX ADMIN — Medication 4: at 08:09

## 2022-03-26 RX ADMIN — PANTOPRAZOLE SODIUM 40 MILLIGRAM(S): 20 TABLET, DELAYED RELEASE ORAL at 21:32

## 2022-03-26 RX ADMIN — BUDESONIDE AND FORMOTEROL FUMARATE DIHYDRATE 2 PUFF(S): 160; 4.5 AEROSOL RESPIRATORY (INHALATION) at 00:06

## 2022-03-26 RX ADMIN — ATORVASTATIN CALCIUM 40 MILLIGRAM(S): 80 TABLET, FILM COATED ORAL at 21:30

## 2022-03-26 RX ADMIN — ENOXAPARIN SODIUM 40 MILLIGRAM(S): 100 INJECTION SUBCUTANEOUS at 06:33

## 2022-03-26 RX ADMIN — BUDESONIDE AND FORMOTEROL FUMARATE DIHYDRATE 2 PUFF(S): 160; 4.5 AEROSOL RESPIRATORY (INHALATION) at 10:24

## 2022-03-26 RX ADMIN — Medication 40 MILLIGRAM(S): at 06:33

## 2022-03-26 RX ADMIN — Medication 3: at 21:42

## 2022-03-26 RX ADMIN — Medication 81 MILLIGRAM(S): at 12:03

## 2022-03-26 RX ADMIN — INSULIN GLARGINE 10 UNIT(S): 100 INJECTION, SOLUTION SUBCUTANEOUS at 08:09

## 2022-03-26 RX ADMIN — GABAPENTIN 100 MILLIGRAM(S): 400 CAPSULE ORAL at 21:30

## 2022-03-26 RX ADMIN — Medication 1 PATCH: at 08:05

## 2022-03-26 RX ADMIN — BUDESONIDE AND FORMOTEROL FUMARATE DIHYDRATE 2 PUFF(S): 160; 4.5 AEROSOL RESPIRATORY (INHALATION) at 21:36

## 2022-03-26 NOTE — PHYSICAL THERAPY INITIAL EVALUATION ADULT - GENERAL OBSERVATIONS, REHAB EVAL
Pt. received supine in bed, NAD but c/o 8/10 low back pain, on O2 via NC at 3L. Pt. cooperative during eval. Pt. A&O x 3.

## 2022-03-26 NOTE — PROGRESS NOTE ADULT - SUBJECTIVE AND OBJECTIVE BOX
Patient is a 61y old  Male who presents with a chief complaint of acute hypoxic hypercapnic respiratory failure (26 Mar 2022 12:40)    PATIENT IS SEEN AND EXAMINED IN MEDICAL FLOOR.    JAMILA [    ]    MT [   ]      GT [   ]    ALLERGIES:  No Known Allergies      Daily     Daily Weight in k.1 (26 Mar 2022 06:48)    VITALS:    Vital Signs Last 24 Hrs  T(C): 36.6 (26 Mar 2022 12:10), Max: 36.8 (26 Mar 2022 04:42)  T(F): 97.9 (26 Mar 2022 12:10), Max: 98.2 (26 Mar 2022 04:42)  HR: 77 (26 Mar 2022 12:10) (66 - 87)  BP: 122/64 (26 Mar 2022 12:10) (111/61 - 130/68)  BP(mean): 75 (26 Mar 2022 10:23) (71 - 82)  RR: 18 (26 Mar 2022 12:10) (15 - 22)  SpO2: 96% (26 Mar 2022 12:10) (93% - 100%)    LABS:    CBC Full  -  ( 26 Mar 2022 06:34 )  WBC Count : 11.03 K/uL  RBC Count : 3.82 M/uL  Hemoglobin : 11.0 g/dL  Hematocrit : 35.3 %  Platelet Count - Automated : 145 K/uL  Mean Cell Volume : 92.4 fl  Mean Cell Hemoglobin : 28.8 pg  Mean Cell Hemoglobin Concentration : 31.2 gm/dL  Auto Neutrophil # : 9.48 K/uL  Auto Lymphocyte # : 1.07 K/uL  Auto Monocyte # : 0.38 K/uL  Auto Eosinophil # : 0.00 K/uL  Auto Basophil # : 0.01 K/uL  Auto Neutrophil % : 86.0 %  Auto Lymphocyte % : 9.7 %  Auto Monocyte % : 3.4 %  Auto Eosinophil % : 0.0 %  Auto Basophil % : 0.1 %    PT/INR - ( 24 Mar 2022 23:09 )   PT: 11.5 sec;   INR: 0.97 ratio         PTT - ( 24 Mar 2022 23:09 )  PTT:29.3 sec      136  |  94<L>  |  22<H>  ----------------------------<  258<H>  4.8   |  40<H>  |  0.59    Ca    8.4      26 Mar 2022 06:34  Phos  2.3     -  Mg     2.0         TPro  5.6<L>  /  Alb  2.8<L>  /  TBili  0.4  /  DBili  x   /  AST  18  /  ALT  29  /  AlkPhos  45      CAPILLARY BLOOD GLUCOSE      POCT Blood Glucose.: 307 mg/dL (26 Mar 2022 17:12)  POCT Blood Glucose.: 333 mg/dL (26 Mar 2022 11:31)  POCT Blood Glucose.: 317 mg/dL (26 Mar 2022 07:59)  POCT Blood Glucose.: 189 mg/dL (25 Mar 2022 22:18)        LIVER FUNCTIONS - ( 25 Mar 2022 06:00 )  Alb: 2.8 g/dL / Pro: 5.6 g/dL / ALK PHOS: 45 U/L / ALT: 29 U/L DA / AST: 18 U/L / GGT: x           Creatinine Trend: 0.59<--, 0.71<--, 0.71<--, 0.73<--, 0.67<--, 0.48<--  I&O's Summary    25 Mar 2022 07:  -  26 Mar 2022 07:00  --------------------------------------------------------  IN: 1052 mL / OUT: 1370 mL / NET: -318 mL    26 Mar 2022 07:01  -  26 Mar 2022 17:21  --------------------------------------------------------  IN: 0 mL / OUT: 301 mL / NET: -301 mL        ABG - ( 25 Mar 2022 13:01 )  pH, Arterial: 7.44  pH, Blood: x     /  pCO2: 64    /  pO2: 57    / HCO3: 44    / Base Excess: 16.0  /  SaO2: 92                  Clean Catch Clean Catch (Midstream)   @ 10:24   >100,000 CFU/ml Enterococcus species  --  --      .Blood Blood-Peripheral  03-25 @ 03:30   No growth to date.  --  --          MEDICATIONS:    MEDICATIONS  (STANDING):  ALBUTerol    90 MICROgram(s) HFA Inhaler 2 Puff(s) Inhalation every 6 hours  ALPRAZolam 0.5 milliGRAM(s) Oral every 12 hours  aspirin  chewable 81 milliGRAM(s) Oral daily  atorvastatin 40 milliGRAM(s) Oral at bedtime  budesonide 160 MICROgram(s)/formoterol 4.5 MICROgram(s) Inhaler 2 Puff(s) Inhalation two times a day  enoxaparin Injectable 40 milliGRAM(s) SubCutaneous every 24 hours  gabapentin 100 milliGRAM(s) Oral three times a day  glucagon  Injectable 1 milliGRAM(s) IntraMuscular once  insulin glargine Injectable (LANTUS) 10 Unit(s) SubCutaneous every morning  insulin lispro (ADMELOG) corrective regimen sliding scale   SubCutaneous Before meals and at bedtime  melatonin 5 milliGRAM(s) Oral at bedtime  methylPREDNISolone sodium succinate Injectable 40 milliGRAM(s) IV Push every 12 hours  nicotine - 21 mG/24Hr(s) Patch 1 patch Transdermal daily  pantoprazole  Injectable 40 milliGRAM(s) IV Push at bedtime  tiotropium 18 MICROgram(s) Capsule 1 Capsule(s) Inhalation daily      MEDICATIONS  (PRN):  oxycodone    5 mG/acetaminophen 325 mG 1 Tablet(s) Oral every 6 hours PRN Severe Pain (7 - 10)        REVIEW OF SYSTEMS:                           ALL ROS DONE [ X   ]      CONSTITUTIONAL:  LETHARGIC [   ], FEVER [   ], UNRESPONSIVE [   ]  CVS:  CP  [   ], SOB, [   ], PALPITATIONS [   ], DIZZYNESS [   ]  RS: COUGH [   ], SPUTUM [   ]  GI: ABDOMINAL PAIN [   ], NAUSEA [   ], VOMITINGS [   ], DIARRHEA [   ], CONSTIPATION [   ]  :  DYSURIA [   ], NOCTURIA [   ], INCREASED FREQUENCY [   ], DRIBLING [   ],  SKELETAL: PAINFUL JOINTS [   ], SWOLLEN JOINTS [   ], NECK ACHE [   ], LOW BACK ACHE [   ],  SKIN : ULCERS [   ], RASH [   ], ITCHING [   ]  CNS: HEAD ACHE [   ], DOUBLE VISION [   ], BLURRED VISION [   ], AMS / CONFUSION [   ], SEIZURES [   ], WEAKNESS [   ],TINGLING / NUMBNESS [   ]      PHYSICAL EXAMINATION:    GENERAL APPEARANCE: NO DISTRESS  HEENT:  NO PALLOR, NO  JVD,  NO   NODES, NECK SUPPLE  CVS: S1 +, S2 +,   RS: AEEB,  OCCASIONAL  RALES +,  RONCHI +  ABD: SOFT, NT, NO, BS +  EXT: NO PE  SKIN: WARM,   SKELETAL:  ROM ACCEPTABLE  CNS:  AAO X 3        RADIOLOGY :    ACC: [id number] EXAM:  XR CHEST PORTABLE IMMED 1V                          PROCEDURE DATE:  2022          INTERPRETATION:  AP chest on 2022 at 2:02 AM. Patient was   intubated.    Heart magnified by technique.    Right lateral chest is excluded from the study.    Minimal infiltrate at right base somewhat increased from .    At endotracheal tube and nasogastric tube in mid inserted.    IMPRESSION: Minimal right base infiltrate. Tubes inserted.      ASSESSMENT :       PLAN:  HPI:  61 year old man from [facility], active smoker, with a PMHx of CAD, CHF on nocturnal BIPAP, COPD (history of multiple intubation in past), peripheral neuropathy, GERD, HTN, HLD, obesity, polyarthritis, Pulmonary HTN, TIA, Vit D Def, and recent readmissionX2 to [facility] for respiratory failure requiring intubation ( 3/9 - 3/15, and ,most recent: 3/18-3/21), re-presented for acute on chronic respiratory failure with hypoxia and hypercapnia. History was limited to NH chart note review , coming in for weakness/AMS as per EMS. pt was not providing any further history. patient was placed on Bi-PAP in ED for ~2hours for acute on chronic hypoxic [id number] respiratory failure , with no improvement in mental status and blood gas , Patient intubated in ED on 3/25/2022 @~1am , right lip 23cm. pt received Bolus NS X2 for soft blood pressure s/p intubation. ICU consulted for close monitoring.      Of note : patient is known for being non adherent with his BiPAP       (25 Mar 2022 01:39)    # COPD EXACERBATION, ACUTE ON CHRONIC HYPOXIC & HYPERCAPNOEIC RESPIRATORY FAILURE S/T NONCOMPLIANCE W/ TOBACCO CESSATION AND W/ BIPAP   - S/P MECHANICAL VENTILATION  - STARTED ON IV STEROIDS [TAPERING], SYMBICORT, SPIRIVA, AZITHROMYCIN, NEBS,  O2 SUPPLEMENTS  - NICOTINE PATCH  - CRITICAL CARE EVALUATION IN PROGRESS    - COUNSELLED PATIENT AT LENGTH, DISCUSSED REGARDING NONCOMPLIANCE W/ SMOKING CESSATION. PATIENT IS HIGH RISK FOR RE-INTUBATION. D/W PATIENT THAT HE IS HIGH RISK FOR RESPIRATORY FAILURE. HE VERBALIZED UNDERSTANDING. HE EXPRESSED THAT HE WOULD ATTEMPT TO ABSTAIN FROM SMOKING AND UTILIZE NICOTINE PATCHES AND WOULD TRY TO REMAIN COMPLIANT WITH NOCTURNAL BIPAP.      # UNCONTROLLED DM, GLUCOSE INTOLERANCE DUE TO STEROIDS  -  LANTUS, SSI + FS     # SMOKER, COUNSELLED TO QUIT SMOKING     # GI AND DVT PROPHYLAXIS    DR. REFUGIO CABELLO

## 2022-03-26 NOTE — DISCHARGE NOTE PROVIDER - NSDCCPCAREPLAN_GEN_ALL_CORE_FT
PRINCIPAL DISCHARGE DIAGNOSIS  Diagnosis: Acute on chronic respiratory failure with hypoxia and hypercapnia  Assessment and Plan of Treatment: Continue oxygen support during the day and use your Trilogy machine at night. It is very important that your use your every night for at least 6 hours a night. Continue using your inhalers as prescribed. Remember to rinse your mouth after using inhalers.      SECONDARY DISCHARGE DIAGNOSES  Diagnosis: COPD (chronic obstructive pulmonary disease)  Assessment and Plan of Treatment: Oxygen 2-3L at home. Continue using oxygen and Trilogy machine as prescribed. You must use Trilogy for at least 6 hours a night. We recommend that you stop smoking. Continue taking medications as prescribed.    Diagnosis: CAD (coronary artery disease)  Assessment and Plan of Treatment: Continue taking medications as prescribed. Follow a low sodium diet.    Diagnosis: Pulmonary HTN  Assessment and Plan of Treatment: Continue taking current medications Follow a low sodium diet.    Diagnosis: HTN (hypertension)  Assessment and Plan of Treatment: Follow a low sodium diet. Continue with current medications.    Diagnosis: Type 2 diabetes mellitus without complication, with long-term current use of insulin  Assessment and Plan of Treatment: Continue taking medications as prescribed. limit the amount of carbohydrates and sugars in your diet. Have your sugar monitored while in rehab.    Diagnosis: Noncompliance with treatment  Assessment and Plan of Treatment: Uou must use your Trilogy machine every night for at least 6 hours a night. If you don't use your machine as prescribed,  there is a high probability that you will be intubated again.    Diagnosis: Smoker  Assessment and Plan of Treatment: You need to stop smoking. Continue using nicotine patch.     PRINCIPAL DISCHARGE DIAGNOSIS  Diagnosis: Acute on chronic respiratory failure with hypoxia and hypercapnia  Assessment and Plan of Treatment: Continue oxygen support during the day and use your Trilogy machine at night. It is very important that your use your every night for at least 6 hours a night. Continue using your inhalers as prescribed. Remember to rinse your mouth after using inhalers.      SECONDARY DISCHARGE DIAGNOSES  Diagnosis: COPD (chronic obstructive pulmonary disease)  Assessment and Plan of Treatment: Oxygen 2-3L at home. Continue using oxygen and Trilogy machine as prescribed. You must use Trilogy for at least 6 hours a night. We recommend that you stop smoking. Continue taking medications as prescribed. You will be starting a medication called Daliresp. You will take 250mg a day for 30 days, followed by 500mg a day.    Diagnosis: Pulmonary HTN  Assessment and Plan of Treatment: Continue taking current medications Follow a low sodium diet.    Diagnosis: CAD (coronary artery disease)  Assessment and Plan of Treatment: Continue taking medications as prescribed. Follow a low sodium diet.    Diagnosis: HTN (hypertension)  Assessment and Plan of Treatment: Follow a low sodium diet. Continue with current medications.    Diagnosis: Type 2 diabetes mellitus without complication, with long-term current use of insulin  Assessment and Plan of Treatment: Continue taking medications as prescribed. limit the amount of carbohydrates and sugars in your diet. Have your sugar monitored while in rehab.    Diagnosis: Noncompliance with treatment  Assessment and Plan of Treatment: Uou must use your Trilogy machine every night for at least 6 hours a night. If you don't use your machine as prescribed,  there is a high probability that you will be intubated again.    Diagnosis: Smoker  Assessment and Plan of Treatment: You need to stop smoking. Continue using nicotine patch.     PRINCIPAL DISCHARGE DIAGNOSIS  Diagnosis: Acute on chronic respiratory failure with hypoxia and hypercapnia  Assessment and Plan of Treatment: Continue oxygen support during the day and use your Trilogy machine at night. It is very important that your use your every night for at least 6 hours a night. Continue using your inhalers as prescribed. Remember to rinse your mouth after using inhalers.  Trilogy/AVAPS settings  Tidal volume 425  Max pressure  24  Min pressure 6  EPAP 8 Backup rate 14  FIO2 40%..  BIPAP settings 16/8  FIO2 40%.      SECONDARY DISCHARGE DIAGNOSES  Diagnosis: COPD (chronic obstructive pulmonary disease)  Assessment and Plan of Treatment: Oxygen 2-3L at home. Continue using oxygen and Trilogy machine as prescribed. You must use Trilogy for at least 6 hours a night. We recommend that you stop smoking. Continue taking medications as prescribed. You will be starting a medication called Daliresp. You will take 250mg a day for 30 days, followed by 500mg a day.    Diagnosis: Pulmonary HTN  Assessment and Plan of Treatment: Continue taking current medications Follow a low sodium diet.    Diagnosis: CAD (coronary artery disease)  Assessment and Plan of Treatment: Continue taking medications as prescribed. Follow a low sodium diet.    Diagnosis: HTN (hypertension)  Assessment and Plan of Treatment: Follow a low sodium diet. Continue with current medications.    Diagnosis: Type 2 diabetes mellitus without complication, with long-term current use of insulin  Assessment and Plan of Treatment: Continue taking medications as prescribed. limit the amount of carbohydrates and sugars in your diet. Have your sugar monitored while in rehab.    Diagnosis: Noncompliance with treatment  Assessment and Plan of Treatment: Uou must use your Trilogy machine every night for at least 6 hours a night. If you don't use your machine as prescribed,  there is a high probability that you will be intubated again.    Diagnosis: Smoker  Assessment and Plan of Treatment: You need to stop smoking. Continue using nicotine patch.     PRINCIPAL DISCHARGE DIAGNOSIS  Diagnosis: Acute on chronic respiratory failure with hypoxia and hypercapnia  Assessment and Plan of Treatment: Continue oxygen support during the day and use your Trilogy machine at night. It is very important that your use your every night for at least 6 hours a night. Continue using your inhalers as prescribed. Remember to rinse your mouth after using inhalers.  Trilogy/AVAPS settings  Tidal volume 425  Max pressure  24  Min pressure 6  EPAP 8 Backup rate 14  FIO2 40%..      SECONDARY DISCHARGE DIAGNOSES  Diagnosis: COPD (chronic obstructive pulmonary disease)  Assessment and Plan of Treatment: Oxygen 2-3L at home. Continue using oxygen and Trilogy machine as prescribed. You must use Trilogy for at least 6 hours a night. We recommend that you stop smoking. Continue taking medications as prescribed. You will be starting a medication called Daliresp. You will take 250mg a day for 30 days, followed by 500mg a day.    Diagnosis: Pulmonary HTN  Assessment and Plan of Treatment: Continue taking current medications Follow a low sodium diet.    Diagnosis: CAD (coronary artery disease)  Assessment and Plan of Treatment: Continue taking medications as prescribed. Follow a low sodium diet.    Diagnosis: HTN (hypertension)  Assessment and Plan of Treatment: Follow a low sodium diet. Continue with current medications.    Diagnosis: Type 2 diabetes mellitus without complication, with long-term current use of insulin  Assessment and Plan of Treatment: Continue taking medications as prescribed. limit the amount of carbohydrates and sugars in your diet. Have your sugar monitored while in rehab.    Diagnosis: Noncompliance with treatment  Assessment and Plan of Treatment: Uou must use your Trilogy machine every night for at least 6 hours a night. If you don't use your machine as prescribed,  there is a high probability that you will be intubated again.    Diagnosis: Smoker  Assessment and Plan of Treatment: You need to stop smoking. Continue using nicotine patch.     PRINCIPAL DISCHARGE DIAGNOSIS  Diagnosis: Acute on chronic respiratory failure with hypoxia and hypercapnia  Assessment and Plan of Treatment: Continue oxygen support during the day and use your Trilogy machine at night. It is very important that your use your every night for at least 6 hours a night. Continue using your inhalers as prescribed. Remember to rinse your mouth after using inhalers.. BIPAP at bedtime, settings : IPAP 14/EPAP 6/ Backup rate 14  FIO2 40%..      SECONDARY DISCHARGE DIAGNOSES  Diagnosis: COPD (chronic obstructive pulmonary disease)  Assessment and Plan of Treatment: Oxygen 2-3L at home. Continue using oxygen and BIPAP machine as prescribed. You must use BIPAP  for at least 6 hours a night. We recommend that you stop smoking. Continue taking medications as prescribed. You will be starting a medication called Daliresp. You will take 250mg a day for 30 days, followed by 500mg a day.    Diagnosis: Pulmonary HTN  Assessment and Plan of Treatment: Continue taking current medications Follow a low sodium diet.    Diagnosis: CAD (coronary artery disease)  Assessment and Plan of Treatment: Continue taking medications as prescribed. Follow a low sodium diet.    Diagnosis: HTN (hypertension)  Assessment and Plan of Treatment: Follow a low sodium diet. Continue with current medications.    Diagnosis: Type 2 diabetes mellitus without complication, with long-term current use of insulin  Assessment and Plan of Treatment: Continue taking medications as prescribed. limit the amount of carbohydrates and sugars in your diet. Have your sugar monitored while in rehab.    Diagnosis: Noncompliance with treatment  Assessment and Plan of Treatment: Uou must use your Trilogy machine every night for at least 6 hours a night. If you don't use your machine as prescribed,  there is a high probability that you will be intubated again.    Diagnosis: Smoker  Assessment and Plan of Treatment: You need to stop smoking. Continue using nicotine patch.

## 2022-03-26 NOTE — DISCHARGE NOTE PROVIDER - HOSPITAL COURSE
61 year old man from [facility], active smoker, with a PMHx of CAD, CHF on nocturnal BIPAP, COPD ( MULTIPLE intubations in past), peripheral neuropathy, GERD, HTN, HLD, obesity, polyarthritis, Pulmonary HTN, TIA, Vit D Def, and recent admission to [facility] for respiratory failure requiring intubation (3/9 - 15 and again on 3/18 - 21), who re-presented to [facility] on 3/24 for acute on chronic respiratory failure with hypoxia and hypercapnia. Has Trilogy and oxygen at [facility]. In the ED he was obtunded, with CO2 greater than 125. Tried on BiPAP without improvement, and subsequently intubated and transferred to ICU. In ICU he was re-started on COPD medications, quickly weaned off ventilator and extubated. Consults placed with PT and social work. Collateral information obtained from assisted living, current episode likely triggered by smoking and/or non-adherence to medications. Tejada removed 3/25. Bladder scan at 10pm shows 450cc retention. Tejada cath reinserted.  3/25 Transferred from ICU to SCU  ***************************************************INCOMPLETE***************************************************************** 61 year old man from [facility], active smoker, with a PMHx of CAD, CHF on nocturnal BIPAP, COPD ( MULTIPLE intubations in past), peripheral neuropathy, GERD, HTN, HLD, obesity, polyarthritis, Pulmonary HTN, TIA, Vit D Def, and recent admission to [facility] for respiratory failure requiring intubation (3/9 - 15 and again on 3/18 - 21), who re-presented to [facility] on 3/24 for acute on chronic respiratory failure with hypoxia and hypercapnia. Has Trilogy and oxygen at [facility]. In the ED he was obtunded, with CO2 greater than 125. Tried on BiPAP without improvement, and subsequently intubated and transferred to ICU. In ICU he was re-started on COPD medications, quickly weaned off ventilator and extubated. Consults placed with PT and social work. Collateral information obtained from assisted living, current episode likely triggered by smoking and/or non-adherence to medications. Tejada removed 3/25. Bladder scan at 10pm shows 450cc retention. Tejada cath reinserted.  3/25 Transferred from ICU to SCU  3/27- Failed TOV on 3/26, starting Tamsulosin today and may reattempt TOV at a later date while inhouse.  Daliresp ordered to begin on 3/28 in hope of better COPD control in this pt w/ multiple readmissions and intubations.   LABS:  CBC Full  -  ( 27 Mar 2022 07:25 )  WBC Count : 9.48 K/uL  RBC Count : 4.08 M/uL  Hemoglobin : 11.6 g/dL  Hematocrit : 36.3 %  Platelet Count - Automated : 136 K/uL  Mean Cell Volume : 89.0 fl  Mean Cell Hemoglobin : 28.4 pg  Mean Cell Hemoglobin Concentration : 32.0 gm/dL  Auto Neutrophil # : 7.62 K/uL  Auto Lymphocyte # : 1.18 K/uL  Auto Monocyte # : 0.58 K/uL  Auto Eosinophil # : 0.01 K/uL  Auto Basophil # : 0.01 K/uL  Auto Neutrophil % : 80.5 %  Auto Lymphocyte % : 12.4 %  Auto Monocyte % : 6.1 %  Auto Eosinophil % : 0.1 %  Auto Basophil % : 0.1 %    03-27    133<L>  |  95<L>  |  22<H>  ----------------------------<  287<H>  4.5   |  36<H>  |  0.57    Ca    8.5      27 Mar 2022 07:25  Phos  3.8     03-27  Mg     1.9     03-27    TPro  5.4<L>  /  Alb  2.8<L>  /  TBili  0.6  /  DBili  x   /  AST  9<L>  /  ALT  25  /  AlkPhos  41  03-27   61 year old man from [facility], active smoker, with a PMHx of CAD, CHF on nocturnal BIPAP, COPD ( MULTIPLE intubations in past), peripheral neuropathy, GERD, HTN, HLD, obesity, polyarthritis, Pulmonary HTN, TIA, Vit D Def, and recent admission to [facility] for respiratory failure requiring intubation (3/9 - 15 and again on 3/18 - 21), who re-presented to [facility] on 3/24 for acute on chronic respiratory failure with hypoxia and hypercapnia. Has Trilogy and oxygen at [facility]. In the ED he was obtunded, with CO2 greater than 125. Tried on BiPAP without improvement, and subsequently intubated and transferred to ICU. In ICU he was re-started on COPD medications, quickly weaned off ventilator and extubated. Consults placed with PT and social work. Collateral information obtained from assisted living, current episode likely triggered by smoking and/or non-adherence to medications. Tejada removed 3/25. Bladder scan at 10pm shows 450cc retention. Tejada cath reinserted.  3/25 Transferred from ICU to SCU  3/27- Failed TOV on 3/26, starting Tamsulosin today and may reattempt TOV at a later date while inhouse.  Daliresp ordered to begin on 3/28 in hope of better COPD control in this pt w/ multiple readmissions and intubations.   LABS:  CBC Full  -  ( 27 Mar 2022 07:25 )  WBC Count : 9.48 K/uL  RBC Count : 4.08 M/uL  Hemoglobin : 11.6 g/dL  Hematocrit : 36.3 %  Platelet Count - Automated : 136 K/uL  Mean Cell Volume : 89.0 fl  Mean Cell Hemoglobin : 28.4 pg  Mean Cell Hemoglobin Concentration : 32.0 gm/dL  Auto Neutrophil # : 7.62 K/uL  Auto Lymphocyte # : 1.18 K/uL  Auto Monocyte # : 0.58 K/uL  Auto Eosinophil # : 0.01 K/uL  Auto Basophil # : 0.01 K/uL  Auto Neutrophil % : 80.5 %  Auto Lymphocyte % : 12.4 %  Auto Monocyte % : 6.1 %  Auto Eosinophil % : 0.1 %  Auto Basophil % : 0.1 %    03-27    133<L>  |  95<L>  |  22<H>  ----------------------------<  287<H>  4.5   |  36<H>  |  0.57    Ca    8.5      27 Mar 2022 07:25  Phos  3.8     03-27  Mg     1.9     03-27    TPro  5.4<L>  /  Alb  2.8<L>  /  TBili  0.6  /  DBili  x   /  AST  9<L>  /  ALT  25  /  AlkPhos  41  03-27    Pt improving and is medically optimized for discharge . Continue  BIPAP IPAP 14/EPAP/FiO2 40% /backup RR 14 . O2 2-3L NC as needed during the day . Continue inhalers. Pt counseled on smoke cessation. 61 year old man from [facility], active smoker, with a PMHx of CAD, CHF on nocturnal BIPAP, COPD ( MULTIPLE intubations in past), peripheral neuropathy, GERD, HTN, HLD, obesity, polyarthritis, Pulmonary HTN, TIA, Vit D Def, and [facility] (3/9 - 15 and again on 3/18 - 21), who [facility] for acute on chronic respiratory failure with hypoxia and hypercapnia. Has Trilogy and oxygen at [facility]. In the ED he was obtunded, with CO2 greater than 125. Tried on BiPAP without improvement, and subsequently intubated and transferred to ICU. In ICU he was re-started on COPD medications, quickly weaned off ventilator and extubated. Consults placed with PT and social work. Collateral information obtained from assisted living, current episode likely triggered by smoking and/or non-adherence to medications. Tejada removed 3/25. Bladder scan at 10pm shows 450cc retention. Tejada cath reinserted.  3/25 Transferred from ICU to SCU  3/27- Failed TOV on 3/26, starting Tamsulosin today and may reattempt TOV at a later date while inhouse.  Daliresp ordered to begin on 3/28 in hope of better COPD control in this pt w/ multiple readmissions and intubations.   LABS:  CBC Full  -  ( 27 Mar 2022 07:25 )  WBC Count : 9.48 K/uL  RBC Count : 4.08 M/uL  Hemoglobin : 11.6 g/dL  Hematocrit : 36.3 %  Platelet Count - Automated : 136 K/uL  Mean Cell Volume : 89.0 fl  Mean Cell Hemoglobin : 28.4 pg  Mean Cell Hemoglobin Concentration : 32.0 gm/dL  Auto Neutrophil # : 7.62 K/uL  Auto Lymphocyte # : 1.18 K/uL  Auto Monocyte # : 0.58 K/uL  Auto Eosinophil # : 0.01 K/uL  Auto Basophil # : 0.01 K/uL  Auto Neutrophil % : 80.5 %  Auto Lymphocyte % : 12.4 %  Auto Monocyte % : 6.1 %  Auto Eosinophil % : 0.1 %  Auto Basophil % : 0.1 %    03-27    133<L>  |  95<L>  |  22<H>  ----------------------------<  287<H>  4.5   |  36<H>  |  0.57    Ca    8.5      27 Mar 2022 07:25  Phos  3.8     03-27  Mg     1.9     03-27    TPro  5.4<L>  /  Alb  2.8<L>  /  TBili  0.6  /  DBili  x   /  AST  9<L>  /  ALT  25  /  AlkPhos  41  03-27    Pt improving and is medically optimized for discharge to [facility] for pulmonary rehab. Continue  BIPAP IPAP 14/EPAP/FiO2 40% /backup RR 14 . O2 2-3L NC as needed during the day . Continue inhalers. Pt counseled on smoke cessation.

## 2022-03-26 NOTE — DISCHARGE NOTE PROVIDER - CARE PROVIDER_API CALL
Raman Ramos)  Medicine  [address] [place] [address]  Phone: [phone]  Fax: [phone]  Follow Up Time:    Raman Ramos)  Medicine  [address] [place] [address]  Phone: [phone]  Fax: [phone]  Follow Up Time:     Facility Provider,   Phone: (   )    -  Fax: (   )    -  Follow Up Time:

## 2022-03-26 NOTE — DISCHARGE NOTE PROVIDER - PROVIDER TOKENS
PROVIDER:[TOKEN:[[id number]]] PROVIDER:[TOKEN:[[id number]]],FREE:[LAST:[Facility Provider],PHONE:[(   )    -],FAX:[(   )    -]]

## 2022-03-26 NOTE — PROGRESS NOTE ADULT - PROBLEM SELECTOR PLAN 10
DVT and GI prophylaxis.  AVAPS nightly and as needed during the day.  Continue oxygen support when not using Trilogy.  Noncompliant with Trilogy at [facility].  Needs to follow with Pulmonary as an outpatient.  High risk for reintubation and death secondary to noncompliance.  Rec starting Daliresp

## 2022-03-26 NOTE — PROGRESS NOTE ADULT - PROBLEM SELECTOR PLAN 2
End stage GOLD 4D  Continue oxygen support. AVAPS nightly and as needed during the day.  Has Trilogy and oxygen at [facility]  Continue bronchodilators and ICS  Solumedrol 40mg every 12 hours.  Rec starting daliresp 250mg daily for 1 month followed by daliresp 500mg daily.

## 2022-03-26 NOTE — PROGRESS NOTE ADULT - ASSESSMENT
61 year old man from [facility], active smoker, with a PMHx of CAD, CHF on nocturnal BIPAP, COPD ( MULTIPLE intubations in past), peripheral neuropathy, GERD, HTN, HLD, obesity, polyarthritis, Pulmonary HTN, TIA, Vit D Def, and recent admission to [facility] for respiratory failure requiring intubation (3/9 - 15 and again on 3/18 - 21), who re-presented to [facility] on 3/24 for acute on chronic respiratory failure with hypoxia and hypercapnia. Has Trilogy and oxygen at [facility]. In the ED he was obtunded, with CO2 greater than 125. Tried on BiPAP without improvement, and subsequently intubated and transferred to ICU. In ICU he was re-started on COPD medications, quickly weaned off ventilator and extubated. Consults placed with PT and social work. Collateral information obtained from assisted living, current episode likely triggered by smoking and/or non-adherence to medications. Tejada removed 3/25. Bladder scan at 10pm shows 450cc retention. Tejada cath reinserted.  3/25 Transferred from ICU to SCU

## 2022-03-26 NOTE — CHART NOTE - NSCHARTNOTEFT_GEN_A_CORE
EVENT:   3/26/22, 5am, bladder scan showed 969 ml residual urine. No any c/o made. Pt.'s sleeping.   HPI:     61 year old man from [facility], active smoker, with a PMHx of CAD, CHF on nocturnal BIPAP, COPD (history of multiple intubation in past), peripheral neuropathy, GERD, HTN, HLD, obesity, polyarthritis, Pulmonary HTN, TIA, Vit D Def, and recent readmissionX2 to [facility] for respiratory failure requiring intubation ( 3/9 - 3/15, and ,most recent: 3/18-3/21), re-presented for acute on chronic respiratory failure with hypoxia and hypercapnia. History was limited to NH chart note review , coming in for weakness/AMS as per EMS. pt was not providing any further history. patient was placed on Bi-PAP in ED for ~2hours for acute on chronic hypoxic [id number] respiratory failure , with no improvement in mental status and blood gas , Patient intubated in ED on 3/25/2022 @~1am , right lip 23cm. pt received Bolus NS X2 for soft blood pressure s/p intubation. ICU consulted for close monitoring.    OBJECTIVE:  Vital Signs Last 24 Hrs  T(C): 36.8 (26 Mar 2022 04:42), Max: 37.1 (25 Mar 2022 06:09)  T(F): 98.2 (26 Mar 2022 04:42), Max: 98.7 (25 Mar 2022 06:09)  HR: 66 (26 Mar 2022 04:42) (66 - 106)  BP: 115/61 (26 Mar 2022 04:42) (89/67 - 135/75)  BP(mean): 80 (25 Mar 2022 20:00) (64 - 88)  RR: 18 (26 Mar 2022 04:42) (12 - 41)  SpO2: 100% (26 Mar 2022 04:42) (91% - 100%)    FOCUSED PHYSICAL EXAM:    LABS:                        11.1   13.14 )-----------( 153      ( 25 Mar 2022 06:00 )             37.1     03-25    138  |  93<L>  |  26<H>  ----------------------------<  167<H>  5.1   |  44<H>  |  0.71    Ca    8.6      25 Mar 2022 06:00  Phos  2.3     03-25  Mg     2.0     03-25    TPro  5.6<L>  /  Alb  2.8<L>  /  TBili  0.4  /  DBili  x   /  AST  18  /  ALT  29  /  AlkPhos  45  03-2    PLAN:   - Indwelling urethral catheter for urine retention/obstruction.     FOLLOW UP / RESULT:   - Monitor input/output,   - Maintain safety measures.

## 2022-03-26 NOTE — DISCHARGE NOTE PROVIDER - NSDCMRMEDTOKEN_GEN_ALL_CORE_FT
ALPRAZolam 0.5 mg oral tablet: 1 tab(s) orally 2 times a day MDD:2  Artificial Tears ophthalmic solution: 1 dose(s) to each affected eye once a day  aspirin 81 mg oral tablet: 1 tab(s) orally once a day  atorvastatin 40 mg oral tablet: 1 tab(s) orally once a day (at bedtime)  azithromycin 250 mg oral capsule: 1 cap(s) orally once a day for 1 more day on 3/22  Basaglar KwikPen 100 units/mL subcutaneous solution: 15 unit(s) subcutaneous once a day  DuoNeb 0.5 mg-2.5 mg/3 mL inhalation solution: 1 scoop(s) inhaled 4 times a day  gabapentin 100 mg oral capsule: 1 cap(s) orally 3 times a day  insulin lispro: 3 unit(s) subcutaneous 3 times a day with meals. Do not take if you are not eating.   ipratropium CFC free 17 mcg/inh inhalation aerosol: 1 puff(s) inhaled every 6 hours  latanoprost 0.005% ophthalmic solution: 1 drop(s) to both  eye once a day (in the evening)  Melatonin 3 mg oral tablet: 1 tab(s) orally once a day (at bedtime)  MiraLax oral powder for reconstitution:   Multiple Vitamins with Minerals oral tablet, chewable: 1 tab(s) orally once a day  nicotine 14 mg/24 hr transdermal film, extended release: 1 patch transdermal once a day  nicotine 14 mg/24 hr transdermal film, extended release: 1  transdermal once a day   oxycodone-acetaminophen 5 mg-325 mg oral tablet: 1 tab(s) orally every 6 hours, As needed, Moderate Pain (4 - 6) MDD:MAX 4 tabs  pantoprazole 40 mg oral delayed release tablet: 1 tab(s) orally once a day (before a meal)  predniSONE 10 mg oral tablet: Taper as follows   4 tab(s) orally once a day x 4 days  3 tab(s) orally once a day x 4 days  2 tab(s) orally once a day x 4 days  1 tab(s) orally once a day x 4 days  0.5 tab orally once a day x 4 days  Proventil 2.5 mg/3 mL (0.083%) inhalation solution: 2 puff(s) inhaled every 6 hours, As Needed  senna oral tablet: 2 tab(s) orally once a day (at bedtime)  simethicone 80 mg oral tablet: 1 tab(s) orally 4 times a day (after meals and at bedtime)  Symbicort 160 mcg-4.5 mcg/inh inhalation aerosol: 2 puff(s) inhaled 2 times a day  traZODone 150 mg oral tablet: 1 tab(s) orally once a day (at bedtime)  Tylenol 325 mg oral capsule: 2 cap(s) orally 2 times a day   ALPRAZolam 0.5 mg oral tablet: 1 tab(s) orally 2 times a day MDD:2  Artificial Tears ophthalmic solution: 1 dose(s) to each affected eye once a day  aspirin 81 mg oral tablet: 1 tab(s) orally once a day  atorvastatin 40 mg oral tablet: 1 tab(s) orally once a day (at bedtime)  Basaglar KwikPen 100 units/mL subcutaneous solution: 15 unit(s) subcutaneous once a day  DuoNeb 0.5 mg-2.5 mg/3 mL inhalation solution: 1 scoop(s) inhaled 4 times a day  finasteride 5 mg oral tablet: 1 tab(s) orally once a day  gabapentin 100 mg oral capsule: 1 cap(s) orally 3 times a day  insulin lispro: 3 unit(s) subcutaneous 3 times a day with meals. Do not take if you are not eating.   latanoprost 0.005% ophthalmic solution: 1 drop(s) to both  eye once a day (in the evening)  Melatonin 3 mg oral tablet: 1 tab(s) orally once a day (at bedtime)  MiraLax oral powder for reconstitution: 1 packet(s) orally once a day, As Needed  Multiple Vitamins with Minerals oral tablet, chewable: 1 tab(s) orally once a day  nicotine 14 mg/24 hr transdermal film, extended release: 1  transdermal once a day   nicotine 14 mg/24 hr transdermal film, extended release: 1 patch transdermal once a day  oxycodone-acetaminophen 5 mg-325 mg oral tablet: 1 tab(s) orally every 6 hours, As needed, Moderate Pain (4 - 6) MDD:MAX 4 tabs  pantoprazole 40 mg oral delayed release tablet: 1 tab(s) orally once a day (before a meal)  Proventil 2.5 mg/3 mL (0.083%) inhalation solution: 2 puff(s) inhaled every 6 hours, As Needed  roflumilast 500 mcg oral tablet: 0.5 tab(s) orally once a day  roflumilast 500 mcg oral tablet: 1 tab(s) orally once a day   senna oral tablet: 2 tab(s) orally once a day (at bedtime)  simethicone 80 mg oral tablet: 1 tab(s) orally 4 times a day (after meals and at bedtime)  Symbicort 160 mcg-4.5 mcg/inh inhalation aerosol: 2 puff(s) inhaled 2 times a day  tamsulosin 0.4 mg oral capsule: 1 cap(s) orally 2 times a day  traZODone 150 mg oral tablet: 1 tab(s) orally once a day (at bedtime)  Tylenol 325 mg oral capsule: 2 cap(s) orally 2 times a day   Admelog 100 units/mL injectable solution: 1 unit(s) injectable 3 times a day (before meals)  0 Unit(s) if Glucose 0 - 250 1 Unit(s) if Glucose 251 - 300 2 Unit(s) if Glucose 301 - 350 3 Unit(s) if Glucose 351 - 400 4 Unit(s) if Glucose Greater Than 400   ALPRAZolam 0.5 mg oral tablet: 1 tab(s) orally 2 times a day MDD:2  Artificial Tears ophthalmic solution: 1 dose(s) to each affected eye once a day  aspirin 81 mg oral tablet: 1 tab(s) orally once a day  atorvastatin 40 mg oral tablet: 1 tab(s) orally once a day (at bedtime)  Basaglar KwikPen 100 units/mL subcutaneous solution: 15 unit(s) subcutaneous once a day (at bedtime)  DuoNeb 0.5 mg-2.5 mg/3 mL inhalation solution: 1 scoop(s) inhaled 4 times a day  finasteride 5 mg oral tablet: 1 tab(s) orally once a day  gabapentin 100 mg oral capsule: 1 cap(s) orally 3 times a day  insulin lispro: 4 unit(s) subcutaneous 3 times a day (before meals)  latanoprost 0.005% ophthalmic solution: 1 drop(s) to both  eye once a day (in the evening)  Melatonin 3 mg oral tablet: 1 tab(s) orally once a day (at bedtime)  MiraLax oral powder for reconstitution: 1 packet(s) orally once a day, As Needed  Multiple Vitamins with Minerals oral tablet, chewable: 1 tab(s) orally once a day  nicotine 14 mg/24 hr transdermal film, extended release: 1  transdermal once a day   nicotine 14 mg/24 hr transdermal film, extended release: 1 patch transdermal once a day  oxycodone-acetaminophen 5 mg-325 mg oral tablet: 1 tab(s) orally every 6 hours, As needed, Moderate Pain (4 - 6) MDD:MAX 4 tabs  pantoprazole 40 mg oral delayed release tablet: 1 tab(s) orally once a day (before a meal)  Proventil 2.5 mg/3 mL (0.083%) inhalation solution: 2 puff(s) inhaled every 6 hours, As Needed  roflumilast 500 mcg oral tablet: 0.5 tab(s) orally once a day  roflumilast 500 mcg oral tablet: 1 tab(s) orally once a day   senna oral tablet: 2 tab(s) orally once a day (at bedtime)  simethicone 80 mg oral tablet: 1 tab(s) orally 4 times a day (after meals and at bedtime)  Symbicort 160 mcg-4.5 mcg/inh inhalation aerosol: 2 puff(s) inhaled 2 times a day  tamsulosin 0.4 mg oral capsule: 1 cap(s) orally 2 times a day  traZODone 150 mg oral tablet: 1 tab(s) orally once a day (at bedtime)  Tylenol 325 mg oral capsule: 2 cap(s) orally 2 times a day

## 2022-03-26 NOTE — PHYSICAL THERAPY INITIAL EVALUATION ADULT - DIAGNOSIS, PT EVAL
Overall decline in functional mobility due to weakness, decreased balance and cardiopulmonary endurance.

## 2022-03-26 NOTE — PROGRESS NOTE ADULT - SUBJECTIVE AND OBJECTIVE BOX
DEVIKA CARBALLO    SCU progress note    INTERVAL HPI/OVERNIGHT EVENTS: Transferred from ICU to SCU late yesterday.  HPI: 61 year old man from [facility], active smoker, with a PMHx of CAD, CHF on nocturnal BIPAP, COPD ( MULTIPLE intubations in past), peripheral neuropathy, GERD, HTN, HLD, obesity, polyarthritis, Pulmonary HTN, TIA, Vit D Def, and recent admission to [facility] for respiratory failure requiring intubation (3/9 - 15 and again on 3/18 - ), who re-presented to [facility] on 3/24 for acute on chronic respiratory failure with hypoxia and hypercapnia. Has Trilogy and oxygen at [facility]. In the ED he was obtunded, with CO2 greater than 125. Tried on BiPAP without improvement, and subsequently intubated and transferred to ICU. In ICU he was re-started on COPD medications, quickly weaned off ventilator and extubated. Consults placed with PT and social work. Collateral information obtained from assisted living, current episode likely triggered by smoking and/or non-adherence to medications. Amor removed 3/25. Bladder scan at 10pm shows 450cc retention. Amor cath reinserted.        DNR [ ]   DNI  [  ]  FULL CODE    Covid - 19 PCR: Negative 3/24    The 4Ms    What Matters Most: see GOC  Age appropriate Medications/Screen for High Risk Medication: Yes  Mentation: see CAM below  Mobility: defer to physical exam    The Confusion Assessment Method (CAM) Diagnostic Algorithm     1: Acute Onset or Fluctuating Course  - Is there evidence of an acute change in mental status from the patient’s baseline? Did the (abnormal) behavior  fluctuate during the day, that is, tend to come and go, or increase and decrease in severity?  [ ] YES [x ] NO     2: Inattention  - Did the patient have difficulty focusing attention, being easily distractible, or having difficulty keeping track of what was being said?  [ ] YES [x ] NO     3: Disorganized thinking  -Was the patient’s thinking disorganized or incoherent, such as rambling or irrelevant conversation, unclear or illogical flow of ideas, or unpredictable switching from subject to subject?  [ ] YES [x ] NO    4: Altered Level of consciousness?  [ ] YES [x ] NO    The diagnosis of delirium by CAM requires the presence of features 1 and 2 and either 3 or 4.    PRESSORS: [ ] YES [x ] NO    Cardiovascular:  Heart Failure  Acute   Acute on Chronic  Chronic         Pulmonary:  ALBUTerol    90 MICROgram(s) HFA Inhaler 2 Puff(s) Inhalation every 6 hours  budesonide 160 MICROgram(s)/formoterol 4.5 MICROgram(s) Inhaler 2 Puff(s) Inhalation two times a day  tiotropium 18 MICROgram(s) Capsule 1 Capsule(s) Inhalation daily    Hematalogic:  aspirin  chewable 81 milliGRAM(s) Oral daily  enoxaparin Injectable 40 milliGRAM(s) SubCutaneous every 24 hours    Other:  atorvastatin 40 milliGRAM(s) Oral at bedtime  gabapentin 100 milliGRAM(s) Oral three times a day  glucagon  Injectable 1 milliGRAM(s) IntraMuscular once  insulin glargine Injectable (LANTUS) 10 Unit(s) SubCutaneous every morning  insulin lispro (ADMELOG) corrective regimen sliding scale   SubCutaneous Before meals and at bedtime  melatonin 5 milliGRAM(s) Oral at bedtime  methylPREDNISolone sodium succinate Injectable 40 milliGRAM(s) IV Push every 8 hours  nicotine - 21 mG/24Hr(s) Patch 1 patch Transdermal daily  pantoprazole  Injectable 40 milliGRAM(s) IV Push at bedtime    ALBUTerol    90 MICROgram(s) HFA Inhaler 2 Puff(s) Inhalation every 6 hours  aspirin  chewable 81 milliGRAM(s) Oral daily  atorvastatin 40 milliGRAM(s) Oral at bedtime  budesonide 160 MICROgram(s)/formoterol 4.5 MICROgram(s) Inhaler 2 Puff(s) Inhalation two times a day  enoxaparin Injectable 40 milliGRAM(s) SubCutaneous every 24 hours  gabapentin 100 milliGRAM(s) Oral three times a day  glucagon  Injectable 1 milliGRAM(s) IntraMuscular once  insulin glargine Injectable (LANTUS) 10 Unit(s) SubCutaneous every morning  insulin lispro (ADMELOG) corrective regimen sliding scale   SubCutaneous Before meals and at bedtime  melatonin 5 milliGRAM(s) Oral at bedtime  methylPREDNISolone sodium succinate Injectable 40 milliGRAM(s) IV Push every 8 hours  nicotine - 21 mG/24Hr(s) Patch 1 patch Transdermal daily  pantoprazole  Injectable 40 milliGRAM(s) IV Push at bedtime  tiotropium 18 MICROgram(s) Capsule 1 Capsule(s) Inhalation daily    Drug Dosing Weight  Height (cm): 172.7 (24 Mar 2022 22:42)  Weight (kg): 93.043 (26 Mar 2022 07:08)  BMI (kg/m2): 31.2 (26 Mar 2022 07:08)  BSA (m2): 2.07 (26 Mar 2022 07:08)    CENTRAL LINE: [ ] YES [x ] NO  LOCATION:   DATE INSERTED:  REMOVE: [ ] YES [ ] NO  EXPLAIN:    AMOR: [x ] YES [ ] NO    DATE INSERTED:  REMOVE:  [ ] YES [x ] NO  EXPLAIN:  Failed TOV    PAST MEDICAL & SURGICAL HISTORY:  COPD (chronic obstructive pulmonary disease)  Oxygen 2-3L at home    Stented coronary artery      HLD (hyperlipidemia)    Pulmonary HTN    Type 2 diabetes mellitus without complication, with long-term current use of insulin    Coronary artery disease involving native coronary artery of native heart without angina pectoris    Smoker    Gastroesophageal reflux disease, esophagitis presence not specified    Oxygen dependent    DM (diabetes mellitus)    CAD (coronary artery disease)    GERD (gastroesophageal reflux disease)    Insomnia    CHF (congestive heart failure)    HTN (hypertension)    Mood disorder    No significant past surgical history          ABG - ( 25 Mar 2022 13:01 )  pH, Arterial: 7.44  pH, Blood: x     /  pCO2: 64    /  pO2: 57    / HCO3: 44    / Base Excess: 16.0  /  SaO2: 92                    03 @ 07:01  -  03-26 @ 07:00  --------------------------------------------------------  IN: 1052 mL / OUT: 1370 mL / NET: -318 mL        Mode: standby      PHYSICAL EXAM:    GENERAL: Mild SOB with minimal exertion  HEAD:  Atraumatic, Normocephalic  EYES: EOMI, PERRLA, conjunctiva and sclera clear  ENMT: No tonsillar erythema, exudates  NECK: Supple, No JVD  NERVOUS SYSTEM:  Alert & Oriented X3. Follow commands, moving all extremities  CHEST/LUNG: Diminished breath sounds bilateral breath sounds with a few scattered rhonchi  HEART: Regular rate and rhythm; No murmurs, rubs, or gallops  ABDOMEN: Soft, Obese, Nontender, Nondistended; Bowel sounds present  EXTREMITIES:  2+ Peripheral Pulses, No clubbing, cyanosis, or edema  LYMPH: No lymphadenopathy noted  SKIN: No rashes or lesions      LABS:  CBC Full  -  ( 26 Mar 2022 06:34 )  WBC Count : 11.03 K/uL  RBC Count : 3.82 M/uL  Hemoglobin : 11.0 g/dL  Hematocrit : 35.3 %  Platelet Count - Automated : 145 K/uL  Mean Cell Volume : 92.4 fl  Mean Cell Hemoglobin : 28.8 pg  Mean Cell Hemoglobin Concentration : 31.2 gm/dL  Auto Neutrophil # : 9.48 K/uL  Auto Lymphocyte # : 1.07 K/uL  Auto Monocyte # : 0.38 K/uL  Auto Eosinophil # : 0.00 K/uL  Auto Basophil # : 0.01 K/uL  Auto Neutrophil % : 86.0 %  Auto Lymphocyte % : 9.7 %  Auto Monocyte % : 3.4 %  Auto Eosinophil % : 0.0 %  Auto Basophil % : 0.1 %        136  |  94<L>  |  22<H>  ----------------------------<  258<H>  4.8   |  40<H>  |  0.59    Ca    8.4      26 Mar 2022 06:34  Phos  2.3     -  Mg     2.0     -    TPro  5.6<L>  /  Alb  2.8<L>  /  TBili  0.4  /  DBili  x   /  AST  18  /  ALT  29  /  AlkPhos  45  03-25    PT/INR - ( 24 Mar 2022 23:09 )   PT: 11.5 sec;   INR: 0.97 ratio         PTT - ( 24 Mar 2022 23:09 )  PTT:29.3 sec  Urinalysis Basic - ( 25 Mar 2022 02:07 )    Color: Yellow / Appearance: Clear / S.020 / pH: x  Gluc: x / Ketone: Negative  / Bili: Negative / Urobili: Negative   Blood: x / Protein: 30 mg/dL / Nitrite: Negative   Leuk Esterase: Trace / RBC: 0-2 /HPF / WBC 3-5 /HPF   Sq Epi: x / Non Sq Epi: Few /HPF / Bacteria: Moderate /HPF            [  ]  DVT Prophylaxis  [  ]  Nutrition, Brand, Rate         Goal Rate        Abnormal Nutritional Status -  Malnutrition   Cachexia      Morbid Obesity BMI >/=40    RADIOLOGY & ADDITIONAL STUDIES:  ***  < from: Xray Chest 1 View-PORTABLE IMMEDIATE (Xray Chest 1 View-PORTABLE IMMEDIATE .) (22 @ 02:55) >    Heart magnified by technique.    Right lateral chest is excluded from the study.    Minimal infiltrate at right base somewhat increased from .    At endotracheal tube and nasogastric tube in mid inserted.    IMPRESSION: Minimal right base infiltrate. Tubes inserted.    < end of copied text >    Goals of Care Discussion with Family/Proxy/Other   - see note from 3/20 and 3/25

## 2022-03-26 NOTE — DISCHARGE NOTE PROVIDER - DISCHARGE DIET
DASH Diet/Easy to Chew Diet/Mildly Thick Liquids DASH Diet/Consistent Carbohydrate Diabetic Diets/Mildly Thick Liquids

## 2022-03-26 NOTE — PHYSICAL THERAPY INITIAL EVALUATION ADULT - PERTINENT HX OF CURRENT PROBLEM, REHAB EVAL
Pt. admitted for acute hypoxic hypercapnic respiratory failure. Pt. remains an active smoker. He is on home O2 and trillogy at [facility].

## 2022-03-27 LAB
-  AMPICILLIN: SIGNIFICANT CHANGE UP
-  CIPROFLOXACIN: SIGNIFICANT CHANGE UP
-  LEVOFLOXACIN: SIGNIFICANT CHANGE UP
-  NITROFURANTOIN: SIGNIFICANT CHANGE UP
-  TETRACYCLINE: SIGNIFICANT CHANGE UP
-  VANCOMYCIN: SIGNIFICANT CHANGE UP
ALBUMIN SERPL ELPH-MCNC: 2.8 G/DL — LOW (ref 3.5–5)
ALP SERPL-CCNC: 41 U/L — SIGNIFICANT CHANGE UP (ref 40–120)
ALT FLD-CCNC: 25 U/L DA — SIGNIFICANT CHANGE UP (ref 10–60)
ANION GAP SERPL CALC-SCNC: 2 MMOL/L — LOW (ref 5–17)
AST SERPL-CCNC: 9 U/L — LOW (ref 10–40)
BASOPHILS # BLD AUTO: 0.01 K/UL — SIGNIFICANT CHANGE UP (ref 0–0.2)
BASOPHILS NFR BLD AUTO: 0.1 % — SIGNIFICANT CHANGE UP (ref 0–2)
BILIRUB SERPL-MCNC: 0.6 MG/DL — SIGNIFICANT CHANGE UP (ref 0.2–1.2)
BUN SERPL-MCNC: 22 MG/DL — HIGH (ref 7–18)
CALCIUM SERPL-MCNC: 8.5 MG/DL — SIGNIFICANT CHANGE UP (ref 8.4–10.5)
CHLORIDE SERPL-SCNC: 95 MMOL/L — LOW (ref 96–108)
CO2 SERPL-SCNC: 36 MMOL/L — HIGH (ref 22–31)
CREAT SERPL-MCNC: 0.57 MG/DL — SIGNIFICANT CHANGE UP (ref 0.5–1.3)
CULTURE RESULTS: SIGNIFICANT CHANGE UP
EGFR: 113 ML/MIN/1.73M2 — SIGNIFICANT CHANGE UP
EOSINOPHIL # BLD AUTO: 0.01 K/UL — SIGNIFICANT CHANGE UP (ref 0–0.5)
EOSINOPHIL NFR BLD AUTO: 0.1 % — SIGNIFICANT CHANGE UP (ref 0–6)
GLUCOSE SERPL-MCNC: 287 MG/DL — HIGH (ref 70–99)
HCT VFR BLD CALC: 36.3 % — LOW (ref 39–50)
HGB BLD-MCNC: 11.6 G/DL — LOW (ref 13–17)
IMM GRANULOCYTES NFR BLD AUTO: 0.8 % — SIGNIFICANT CHANGE UP (ref 0–1.5)
LYMPHOCYTES # BLD AUTO: 1.18 K/UL — SIGNIFICANT CHANGE UP (ref 1–3.3)
LYMPHOCYTES # BLD AUTO: 12.4 % — LOW (ref 13–44)
MAGNESIUM SERPL-MCNC: 1.9 MG/DL — SIGNIFICANT CHANGE UP (ref 1.6–2.6)
MCHC RBC-ENTMCNC: 28.4 PG — SIGNIFICANT CHANGE UP (ref 27–34)
MCHC RBC-ENTMCNC: 32 GM/DL — SIGNIFICANT CHANGE UP (ref 32–36)
MCV RBC AUTO: 89 FL — SIGNIFICANT CHANGE UP (ref 80–100)
METHOD TYPE: SIGNIFICANT CHANGE UP
MONOCYTES # BLD AUTO: 0.58 K/UL — SIGNIFICANT CHANGE UP (ref 0–0.9)
MONOCYTES NFR BLD AUTO: 6.1 % — SIGNIFICANT CHANGE UP (ref 2–14)
NEUTROPHILS # BLD AUTO: 7.62 K/UL — HIGH (ref 1.8–7.4)
NEUTROPHILS NFR BLD AUTO: 80.5 % — HIGH (ref 43–77)
NRBC # BLD: 0 /100 WBCS — SIGNIFICANT CHANGE UP (ref 0–0)
ORGANISM # SPEC MICROSCOPIC CNT: SIGNIFICANT CHANGE UP
ORGANISM # SPEC MICROSCOPIC CNT: SIGNIFICANT CHANGE UP
PHOSPHATE SERPL-MCNC: 3.8 MG/DL — SIGNIFICANT CHANGE UP (ref 2.5–4.5)
PLATELET # BLD AUTO: 136 K/UL — LOW (ref 150–400)
POTASSIUM SERPL-MCNC: 4.5 MMOL/L — SIGNIFICANT CHANGE UP (ref 3.5–5.3)
POTASSIUM SERPL-SCNC: 4.5 MMOL/L — SIGNIFICANT CHANGE UP (ref 3.5–5.3)
PROT SERPL-MCNC: 5.4 G/DL — LOW (ref 6–8.3)
RBC # BLD: 4.08 M/UL — LOW (ref 4.2–5.8)
RBC # FLD: 13 % — SIGNIFICANT CHANGE UP (ref 10.3–14.5)
SODIUM SERPL-SCNC: 133 MMOL/L — LOW (ref 135–145)
SPECIMEN SOURCE: SIGNIFICANT CHANGE UP
WBC # BLD: 9.48 K/UL — SIGNIFICANT CHANGE UP (ref 3.8–10.5)
WBC # FLD AUTO: 9.48 K/UL — SIGNIFICANT CHANGE UP (ref 3.8–10.5)

## 2022-03-27 RX ORDER — TAMSULOSIN HYDROCHLORIDE 0.4 MG/1
0.4 CAPSULE ORAL
Refills: 0 | Status: DISCONTINUED | OUTPATIENT
Start: 2022-03-27 | End: 2022-03-31

## 2022-03-27 RX ORDER — FINASTERIDE 5 MG/1
5 TABLET, FILM COATED ORAL DAILY
Refills: 0 | Status: DISCONTINUED | OUTPATIENT
Start: 2022-03-27 | End: 2022-03-31

## 2022-03-27 RX ORDER — INSULIN LISPRO 100/ML
8 VIAL (ML) SUBCUTANEOUS ONCE
Refills: 0 | Status: COMPLETED | OUTPATIENT
Start: 2022-03-27 | End: 2022-03-27

## 2022-03-27 RX ORDER — ROFLUMILAST 500 UG/1
250 TABLET ORAL DAILY
Refills: 0 | Status: DISCONTINUED | OUTPATIENT
Start: 2022-03-27 | End: 2022-03-31

## 2022-03-27 RX ORDER — TAMSULOSIN HYDROCHLORIDE 0.4 MG/1
0.4 CAPSULE ORAL AT BEDTIME
Refills: 0 | Status: DISCONTINUED | OUTPATIENT
Start: 2022-03-27 | End: 2022-03-27

## 2022-03-27 RX ADMIN — SENNA PLUS 2 TABLET(S): 8.6 TABLET ORAL at 21:06

## 2022-03-27 RX ADMIN — Medication 5 MILLIGRAM(S): at 21:07

## 2022-03-27 RX ADMIN — Medication 40 MILLIGRAM(S): at 17:20

## 2022-03-27 RX ADMIN — ROFLUMILAST 250 MICROGRAM(S): 500 TABLET ORAL at 17:20

## 2022-03-27 RX ADMIN — ATORVASTATIN CALCIUM 40 MILLIGRAM(S): 80 TABLET, FILM COATED ORAL at 21:06

## 2022-03-27 RX ADMIN — POLYETHYLENE GLYCOL 3350 17 GRAM(S): 17 POWDER, FOR SOLUTION ORAL at 11:22

## 2022-03-27 RX ADMIN — OXYCODONE AND ACETAMINOPHEN 1 TABLET(S): 5; 325 TABLET ORAL at 14:31

## 2022-03-27 RX ADMIN — OXYCODONE AND ACETAMINOPHEN 1 TABLET(S): 5; 325 TABLET ORAL at 09:26

## 2022-03-27 RX ADMIN — Medication 40 MILLIGRAM(S): at 06:29

## 2022-03-27 RX ADMIN — OXYCODONE AND ACETAMINOPHEN 1 TABLET(S): 5; 325 TABLET ORAL at 08:26

## 2022-03-27 RX ADMIN — PANTOPRAZOLE SODIUM 40 MILLIGRAM(S): 20 TABLET, DELAYED RELEASE ORAL at 21:08

## 2022-03-27 RX ADMIN — TAMSULOSIN HYDROCHLORIDE 0.4 MILLIGRAM(S): 0.4 CAPSULE ORAL at 19:03

## 2022-03-27 RX ADMIN — INSULIN GLARGINE 10 UNIT(S): 100 INJECTION, SOLUTION SUBCUTANEOUS at 08:27

## 2022-03-27 RX ADMIN — GABAPENTIN 100 MILLIGRAM(S): 400 CAPSULE ORAL at 13:27

## 2022-03-27 RX ADMIN — BUDESONIDE AND FORMOTEROL FUMARATE DIHYDRATE 2 PUFF(S): 160; 4.5 AEROSOL RESPIRATORY (INHALATION) at 22:17

## 2022-03-27 RX ADMIN — Medication 81 MILLIGRAM(S): at 11:20

## 2022-03-27 RX ADMIN — OXYCODONE AND ACETAMINOPHEN 1 TABLET(S): 5; 325 TABLET ORAL at 15:31

## 2022-03-27 RX ADMIN — Medication 3: at 08:26

## 2022-03-27 RX ADMIN — TAMSULOSIN HYDROCHLORIDE 0.4 MILLIGRAM(S): 0.4 CAPSULE ORAL at 11:20

## 2022-03-27 RX ADMIN — Medication 5: at 17:19

## 2022-03-27 RX ADMIN — Medication 1 PATCH: at 07:10

## 2022-03-27 RX ADMIN — Medication 0.5 MILLIGRAM(S): at 06:29

## 2022-03-27 RX ADMIN — OXYCODONE AND ACETAMINOPHEN 1 TABLET(S): 5; 325 TABLET ORAL at 21:07

## 2022-03-27 RX ADMIN — Medication 1 PATCH: at 11:32

## 2022-03-27 RX ADMIN — Medication 0.5 MILLIGRAM(S): at 17:19

## 2022-03-27 RX ADMIN — Medication 1 PATCH: at 11:27

## 2022-03-27 RX ADMIN — Medication 5: at 22:06

## 2022-03-27 RX ADMIN — GABAPENTIN 100 MILLIGRAM(S): 400 CAPSULE ORAL at 21:07

## 2022-03-27 RX ADMIN — GABAPENTIN 100 MILLIGRAM(S): 400 CAPSULE ORAL at 06:31

## 2022-03-27 RX ADMIN — Medication 8 UNIT(S): at 11:58

## 2022-03-27 RX ADMIN — FINASTERIDE 5 MILLIGRAM(S): 5 TABLET, FILM COATED ORAL at 17:20

## 2022-03-27 RX ADMIN — BUDESONIDE AND FORMOTEROL FUMARATE DIHYDRATE 2 PUFF(S): 160; 4.5 AEROSOL RESPIRATORY (INHALATION) at 11:21

## 2022-03-27 RX ADMIN — Medication 1 PATCH: at 20:04

## 2022-03-27 RX ADMIN — ENOXAPARIN SODIUM 40 MILLIGRAM(S): 100 INJECTION SUBCUTANEOUS at 06:16

## 2022-03-27 NOTE — SWALLOW BEDSIDE ASSESSMENT ADULT - NS SPL SWALLOW CLINIC TRIAL FT
Pt with impulsive behavior (i.e., taking multiple ice chips at a time) No overt s/s of laryngeal pen or asp

## 2022-03-27 NOTE — PROGRESS NOTE ADULT - PROBLEM SELECTOR PLAN 1
Readmitted for hypercapnic respiratory failure. PCO2 greater than 120. Started on BIPAP therapy without improvement. Patient intubated in ED. Extubated 3/25.  Start AVAPS at night and as needed during the day. Oxygen 2LPM when not on AVAPS.  Has Trilogy and oxygen at [facility]. Will have [facility] check equipment at [facility].  End stage COPD. GOLD 4D  Continue bronchodilators and ICS  Decrease solumedrol to every 12 hours.  Recommend starting Daliresp. Would benefit from outpatient PFTs and Pulmonary. Readmitted for hypercapnic respiratory failure. PCO2 greater than 120. Started on BIPAP therapy without improvement. Patient intubated in ED. Extubated 3/25.  Start AVAPS at night and as needed during the day. Oxygen 2LPM when not on AVAPS.  Has Trilogy and oxygen at [facility]. Will have [facility] check equipment at [facility].  End stage COPD. GOLD 4D  Continue bronchodilators and ICS  Solumedrol every 12 hours to complete today.  Daliresp starting on 3/28. Would benefit from outpatient PFTs and Pulmonary.

## 2022-03-27 NOTE — SWALLOW BEDSIDE ASSESSMENT ADULT - SLP PERTINENT HISTORY OF CURRENT PROBLEM
61 year old man from [facility], active smoker, with a PMHx of CAD, CHF on nocturnal BIPAP, COPD (history of multiple intubation in past), peripheral neuropathy, GERD, HTN, HLD, obesity, polyarthritis, Pulmonary HTN, TIA, Vit D Def, and recent readmissionX2 to [facility] for respiratory failure requiring intubation ( 3/9 - 3/15, and ,most recent: 3/18-3/21), re-presented for acute on chronic respiratory failure with hypoxia and hypercapnia. History was limited to NH chart note review , coming in for weakness/AMS as per EMS. pt was not providing any further history. patient was placed on Bi-PAP in ED for ~2hours for acute on chronic hypoxic [id number] respiratory failure , with no improvement in mental status and blood gas , Patient intubated in ED on 3/25/2022 @~1am , right lip 23cm. pt received Bolus NS X2 for soft blood pressure s/p intubation. ICU consulted for close monitoring.

## 2022-03-27 NOTE — PROGRESS NOTE ADULT - PROBLEM SELECTOR PLAN 5
Continue morning lantus and s;iding scale coverage.  Monitor glucose. Elevated glucose in the setting of solumedrol and increased po intake  Continue morning Lantus and sliding scale coverage accordingly  Monitor glucose.

## 2022-03-27 NOTE — PROGRESS NOTE ADULT - ASSESSMENT
61 year old man from [facility], active smoker, with a PMHx of CAD, CHF on nocturnal BIPAP, COPD ( MULTIPLE intubations in past), peripheral neuropathy, GERD, HTN, HLD, obesity, polyarthritis, Pulmonary HTN, TIA, Vit D Def, and recent admission to [facility] for respiratory failure requiring intubation (3/9 - 15 and again on 3/18 - 21), who re-presented to [facility] on 3/24 for acute on chronic respiratory failure with hypoxia and hypercapnia. Has Trilogy and oxygen at [facility]. In the ED he was obtunded, with CO2 greater than 125. Tried on BiPAP without improvement, and subsequently intubated and transferred to ICU. In ICU he was re-started on COPD medications, quickly weaned off ventilator and extubated. Consults placed with PT and social work. Collateral information obtained from assisted living, current episode likely triggered by smoking and/or non-adherence to medications. Tejada removed 3/25. Bladder scan at 10pm shows 450cc retention. Tejada cath reinserted.  3/25 Transferred from ICU to SCU 61 year old man from [facility], active smoker, with a PMHx of CAD, CHF on nocturnal BIPAP, COPD ( MULTIPLE intubations in past), peripheral neuropathy, GERD, HTN, HLD, obesity, polyarthritis, Pulmonary HTN, TIA, Vit D Def, and recent admission to [facility] for respiratory failure requiring intubation (3/9 - 15 and again on 3/18 - 21), who re-presented to [facility] on 3/24 for acute on chronic respiratory failure with hypoxia and hypercapnia. Has Trilogy and oxygen at [facility]. In the ED he was obtunded, with CO2 greater than 125. Tried on BiPAP without improvement, and subsequently intubated and transferred to ICU. In ICU he was re-started on COPD medications, quickly weaned off ventilator and extubated. Consults placed with PT and social work. Collateral information obtained from assisted living, current episode likely triggered by smoking and/or non-adherence to medications. Tejdaa removed 3/25. Bladder scan at 10pm shows 450cc retention. Tejada cath reinserted.  3/25 Transferred from ICU to SCU.   3/27- Failed TOV on 3/26, starting Tamsulosin today and may reattempt TOV at a later date while inhouse.  Daliresp ordered to begin on 3/28 in hope of better COPD control in this pt w/ multiple readmissions and intubations.

## 2022-03-27 NOTE — PROGRESS NOTE ADULT - SUBJECTIVE AND OBJECTIVE BOX
Patient is a 61y old  Male who presents with a chief complaint of acute hypoxic hypercapnic respiratory failure (27 Mar 2022 10:27)    PATIENT IS SEEN AND EXAMINED IN MEDICAL FLOOR.    JAMILA [    ]    MT [   ]      GT [   ]    ALLERGIES:  No Known Allergies      Daily     Daily Weight in k (27 Mar 2022 05:00)    VITALS:    Vital Signs Last 24 Hrs  T(C): 36.6 (27 Mar 2022 12:23), Max: 36.6 (26 Mar 2022 21:20)  T(F): 97.8 (27 Mar 2022 12:23), Max: 97.9 (26 Mar 2022 21:20)  HR: 69 (27 Mar 2022 12:23) (69 - 78)  BP: 121/52 (27 Mar 2022 12:23) (106/61 - 129/66)  BP(mean): --  RR: 20 (27 Mar 2022 12:23) (18 - 20)  SpO2: 99% (27 Mar 2022 12:23) (94% - 100%)    LABS:    CBC Full  -  ( 27 Mar 2022 07:25 )  WBC Count : 9.48 K/uL  RBC Count : 4.08 M/uL  Hemoglobin : 11.6 g/dL  Hematocrit : 36.3 %  Platelet Count - Automated : 136 K/uL  Mean Cell Volume : 89.0 fl  Mean Cell Hemoglobin : 28.4 pg  Mean Cell Hemoglobin Concentration : 32.0 gm/dL  Auto Neutrophil # : 7.62 K/uL  Auto Lymphocyte # : 1.18 K/uL  Auto Monocyte # : 0.58 K/uL  Auto Eosinophil # : 0.01 K/uL  Auto Basophil # : 0.01 K/uL  Auto Neutrophil % : 80.5 %  Auto Lymphocyte % : 12.4 %  Auto Monocyte % : 6.1 %  Auto Eosinophil % : 0.1 %  Auto Basophil % : 0.1 %      0327    133<L>  |  95<L>  |  22<H>  ----------------------------<  287<H>  4.5   |  36<H>  |  0.57    Ca    8.5      27 Mar 2022 07:25  Phos  3.8     03-  Mg     1.9         TPro  5.4<L>  /  Alb  2.8<L>  /  TBili  0.6  /  DBili  x   /  AST  9<L>  /  ALT  25  /  AlkPhos  41      CAPILLARY BLOOD GLUCOSE      POCT Blood Glucose.: 412 mg/dL (27 Mar 2022 11:42)  POCT Blood Glucose.: 266 mg/dL (27 Mar 2022 08:23)  POCT Blood Glucose.: 276 mg/dL (26 Mar 2022 21:38)  POCT Blood Glucose.: 307 mg/dL (26 Mar 2022 17:12)        LIVER FUNCTIONS - ( 27 Mar 2022 07:25 )  Alb: 2.8 g/dL / Pro: 5.4 g/dL / ALK PHOS: 41 U/L / ALT: 25 U/L DA / AST: 9 U/L / GGT: x           Creatinine Trend: 0.57<--, 0.59<--, 0.71<--, 0.71<--, 0.73<--, 0.67<--  I&O's Summary    26 Mar 2022 07:01  -  27 Mar 2022 07:00  --------------------------------------------------------  IN: 0 mL / OUT: 1901 mL / NET: -1901 mL    27 Mar 2022 07:01  -  27 Mar 2022 14:16  --------------------------------------------------------  IN: 0 mL / OUT: 602 mL / NET: -602 mL            Clean Catch Clean Catch (Midstream)   @ 10:24   >100,000 CFU/ml Enterococcus faecalis  --  Enterococcus faecalis      .Blood Blood-Peripheral   @ 03:30   No growth to date.  --  --          MEDICATIONS:    MEDICATIONS  (STANDING):  ALBUTerol    90 MICROgram(s) HFA Inhaler 2 Puff(s) Inhalation every 6 hours  ALPRAZolam 0.5 milliGRAM(s) Oral every 12 hours  aspirin  chewable 81 milliGRAM(s) Oral daily  atorvastatin 40 milliGRAM(s) Oral at bedtime  budesonide 160 MICROgram(s)/formoterol 4.5 MICROgram(s) Inhaler 2 Puff(s) Inhalation two times a day  enoxaparin Injectable 40 milliGRAM(s) SubCutaneous every 24 hours  gabapentin 100 milliGRAM(s) Oral three times a day  glucagon  Injectable 1 milliGRAM(s) IntraMuscular once  insulin glargine Injectable (LANTUS) 10 Unit(s) SubCutaneous every morning  insulin lispro (ADMELOG) corrective regimen sliding scale   SubCutaneous Before meals and at bedtime  melatonin 5 milliGRAM(s) Oral at bedtime  methylPREDNISolone sodium succinate Injectable 40 milliGRAM(s) IV Push every 12 hours  nicotine - 21 mG/24Hr(s) Patch 1 patch Transdermal daily  pantoprazole  Injectable 40 milliGRAM(s) IV Push at bedtime  polyethylene glycol 3350 17 Gram(s) Oral daily  roflumilast 250 MICROGram(s) Oral daily  senna 2 Tablet(s) Oral at bedtime  tamsulosin 0.4 milliGRAM(s) Oral at bedtime  tiotropium 18 MICROgram(s) Capsule 1 Capsule(s) Inhalation daily      MEDICATIONS  (PRN):  bisacodyl Suppository 10 milliGRAM(s) Rectal daily PRN Constipation  oxycodone    5 mG/acetaminophen 325 mG 1 Tablet(s) Oral every 6 hours PRN Severe Pain (7 - 10)        REVIEW OF SYSTEMS:                           ALL ROS DONE [ X   ]      CONSTITUTIONAL:  LETHARGIC [   ], FEVER [   ], UNRESPONSIVE [   ]  CVS:  CP  [   ], SOB, [   ], PALPITATIONS [   ], DIZZYNESS [   ]  RS: COUGH [   ], SPUTUM [   ]  GI: ABDOMINAL PAIN [   ], NAUSEA [   ], VOMITINGS [   ], DIARRHEA [   ], CONSTIPATION [   ]  :  DYSURIA [   ], NOCTURIA [   ], INCREASED FREQUENCY [   ], DRIBLING [   ],  SKELETAL: PAINFUL JOINTS [   ], SWOLLEN JOINTS [   ], NECK ACHE [   ], LOW BACK ACHE [   ],  SKIN : ULCERS [   ], RASH [   ], ITCHING [   ]  CNS: HEAD ACHE [   ], DOUBLE VISION [   ], BLURRED VISION [   ], AMS / CONFUSION [   ], SEIZURES [   ], WEAKNESS [   ],TINGLING / NUMBNESS [   ]        PHYSICAL EXAMINATION:    GENERAL APPEARANCE: NO DISTRESS  HEENT:  NO PALLOR, NO  JVD,  NO   NODES, NECK SUPPLE  CVS: S1 +, S2 +,   RS: AEEB,  OCCASIONAL  RALES +,  RONCHI +  ABD: SOFT, NT, NO, BS +  EXT: NO PE  SKIN: WARM,   SKELETAL:  ROM ACCEPTABLE  CNS:  AAO X 3        RADIOLOGY :    ACC: [id number] EXAM:  XR CHEST PORTABLE IMMED 1V                          PROCEDURE DATE:  2022          INTERPRETATION:  AP chest on 2022 at 2:02 AM. Patient was   intubated.    Heart magnified by technique.    Right lateral chest is excluded from the study.    Minimal infiltrate at right base somewhat increased from .    At endotracheal tube and nasogastric tube in mid inserted.    IMPRESSION: Minimal right base infiltrate. Tubes inserted.      ASSESSMENT :       PLAN:  HPI:  61 year old man from [facility], active smoker, with a PMHx of CAD, CHF on nocturnal BIPAP, COPD (history of multiple intubation in past), peripheral neuropathy, GERD, HTN, HLD, obesity, polyarthritis, Pulmonary HTN, TIA, Vit D Def, and recent readmissionX2 to [facility] for respiratory failure requiring intubation ( 3/9 - 3/15, and ,most recent: 3/18-3/21), re-presented for acute on chronic respiratory failure with hypoxia and hypercapnia. History was limited to NH chart note review , coming in for weakness/AMS as per EMS. pt was not providing any further history. patient was placed on Bi-PAP in ED for ~2hours for acute on chronic hypoxic [id number] respiratory failure , with no improvement in mental status and blood gas , Patient intubated in ED on 3/25/2022 @~1am , right lip 23cm. pt received Bolus NS X2 for soft blood pressure s/p intubation. ICU consulted for close monitoring.      Of note : patient is known for being non adherent with his BiPAP      #  DC PLAN IN AM TO [facility] SINCE HE REQUIRES HIGHER LEVEL OF CARE WHICH [facility] ASSISTED [facility] IS UNABLE TO PROVIDE    # COPD EXACERBATION, ACUTE ON CHRONIC HYPOXIC & HYPERCAPNEIC RESPIRATORY FAILURE S/T NONCOMPLIANCE W/ TOBACCO CESSATION AND W/ BIPAP   - S/P MECHANICAL VENTILATION  - STARTED ON IV STEROIDS [TAPERING], SYMBICORT, SPIRIVA, AZITHROMYCIN, NEBS,  O2 SUPPLEMENTS  - NICOTINE PATCH  - CRITICAL CARE EVALUATION IN PROGRESS    - COUNSELLED PATIENT AT LENGTH, DISCUSSED REGARDING NONCOMPLIANCE W/ SMOKING CESSATION. PATIENT IS HIGH RISK FOR RE-INTUBATION. D/W PATIENT THAT HE IS HIGH RISK FOR RESPIRATORY FAILURE. HE VERBALIZED UNDERSTANDING. HE EXPRESSED THAT HE WOULD ATTEMPT TO ABSTAIN FROM SMOKING AND UTILIZE NICOTINE PATCHES AND WOULD TRY TO REMAIN COMPLIANT WITH NOCTURNAL BIPAP    # CONSTIPATION , URINARY RETENTION - WILL DC AMOR, ADD MIRALAX AND SENNA, TAP WATER ENEMA X 1 TIME       # UNCONTROLLED DM, GLUCOSE INTOLERANCE DUE TO STEROIDS  -  LANTUS, SSI + FS     # SMOKER, COUNSELLED TO QUIT SMOKING     # GI AND DVT PROPHYLAXIS    DR. REFUGIO CABELLO Patient is a 61y old  Male who presents with a chief complaint of acute hypoxic hypercapnic respiratory failure (27 Mar 2022 10:27)    PATIENT IS SEEN AND EXAMINED IN MEDICAL FLOOR.    JAMILA [    ]    MT [   ]      GT [   ]    ALLERGIES:  No Known Allergies      Daily     Daily Weight in k (27 Mar 2022 05:00)    VITALS:    Vital Signs Last 24 Hrs  T(C): 36.6 (27 Mar 2022 12:23), Max: 36.6 (26 Mar 2022 21:20)  T(F): 97.8 (27 Mar 2022 12:23), Max: 97.9 (26 Mar 2022 21:20)  HR: 69 (27 Mar 2022 12:23) (69 - 78)  BP: 121/52 (27 Mar 2022 12:23) (106/61 - 129/66)  BP(mean): --  RR: 20 (27 Mar 2022 12:23) (18 - 20)  SpO2: 99% (27 Mar 2022 12:23) (94% - 100%)    LABS:    CBC Full  -  ( 27 Mar 2022 07:25 )  WBC Count : 9.48 K/uL  RBC Count : 4.08 M/uL  Hemoglobin : 11.6 g/dL  Hematocrit : 36.3 %  Platelet Count - Automated : 136 K/uL  Mean Cell Volume : 89.0 fl  Mean Cell Hemoglobin : 28.4 pg  Mean Cell Hemoglobin Concentration : 32.0 gm/dL  Auto Neutrophil # : 7.62 K/uL  Auto Lymphocyte # : 1.18 K/uL  Auto Monocyte # : 0.58 K/uL  Auto Eosinophil # : 0.01 K/uL  Auto Basophil # : 0.01 K/uL  Auto Neutrophil % : 80.5 %  Auto Lymphocyte % : 12.4 %  Auto Monocyte % : 6.1 %  Auto Eosinophil % : 0.1 %  Auto Basophil % : 0.1 %      0327    133<L>  |  95<L>  |  22<H>  ----------------------------<  287<H>  4.5   |  36<H>  |  0.57    Ca    8.5      27 Mar 2022 07:25  Phos  3.8     03-  Mg     1.9         TPro  5.4<L>  /  Alb  2.8<L>  /  TBili  0.6  /  DBili  x   /  AST  9<L>  /  ALT  25  /  AlkPhos  41      CAPILLARY BLOOD GLUCOSE      POCT Blood Glucose.: 412 mg/dL (27 Mar 2022 11:42)  POCT Blood Glucose.: 266 mg/dL (27 Mar 2022 08:23)  POCT Blood Glucose.: 276 mg/dL (26 Mar 2022 21:38)  POCT Blood Glucose.: 307 mg/dL (26 Mar 2022 17:12)        LIVER FUNCTIONS - ( 27 Mar 2022 07:25 )  Alb: 2.8 g/dL / Pro: 5.4 g/dL / ALK PHOS: 41 U/L / ALT: 25 U/L DA / AST: 9 U/L / GGT: x           Creatinine Trend: 0.57<--, 0.59<--, 0.71<--, 0.71<--, 0.73<--, 0.67<--  I&O's Summary    26 Mar 2022 07:01  -  27 Mar 2022 07:00  --------------------------------------------------------  IN: 0 mL / OUT: 1901 mL / NET: -1901 mL    27 Mar 2022 07:01  -  27 Mar 2022 14:16  --------------------------------------------------------  IN: 0 mL / OUT: 602 mL / NET: -602 mL            Clean Catch Clean Catch (Midstream)   @ 10:24   >100,000 CFU/ml Enterococcus faecalis  --  Enterococcus faecalis      .Blood Blood-Peripheral   @ 03:30   No growth to date.  --  --          MEDICATIONS:    MEDICATIONS  (STANDING):  ALBUTerol    90 MICROgram(s) HFA Inhaler 2 Puff(s) Inhalation every 6 hours  ALPRAZolam 0.5 milliGRAM(s) Oral every 12 hours  aspirin  chewable 81 milliGRAM(s) Oral daily  atorvastatin 40 milliGRAM(s) Oral at bedtime  budesonide 160 MICROgram(s)/formoterol 4.5 MICROgram(s) Inhaler 2 Puff(s) Inhalation two times a day  enoxaparin Injectable 40 milliGRAM(s) SubCutaneous every 24 hours  gabapentin 100 milliGRAM(s) Oral three times a day  glucagon  Injectable 1 milliGRAM(s) IntraMuscular once  insulin glargine Injectable (LANTUS) 10 Unit(s) SubCutaneous every morning  insulin lispro (ADMELOG) corrective regimen sliding scale   SubCutaneous Before meals and at bedtime  melatonin 5 milliGRAM(s) Oral at bedtime  methylPREDNISolone sodium succinate Injectable 40 milliGRAM(s) IV Push every 12 hours  nicotine - 21 mG/24Hr(s) Patch 1 patch Transdermal daily  pantoprazole  Injectable 40 milliGRAM(s) IV Push at bedtime  polyethylene glycol 3350 17 Gram(s) Oral daily  roflumilast 250 MICROGram(s) Oral daily  senna 2 Tablet(s) Oral at bedtime  tamsulosin 0.4 milliGRAM(s) Oral at bedtime  tiotropium 18 MICROgram(s) Capsule 1 Capsule(s) Inhalation daily      MEDICATIONS  (PRN):  bisacodyl Suppository 10 milliGRAM(s) Rectal daily PRN Constipation  oxycodone    5 mG/acetaminophen 325 mG 1 Tablet(s) Oral every 6 hours PRN Severe Pain (7 - 10)        REVIEW OF SYSTEMS:                           ALL ROS DONE [ X   ]      CONSTITUTIONAL:  LETHARGIC [   ], FEVER [   ], UNRESPONSIVE [   ]  CVS:  CP  [   ], SOB, [   ], PALPITATIONS [   ], DIZZYNESS [   ]  RS: COUGH [   ], SPUTUM [   ]  GI: ABDOMINAL PAIN [   ], NAUSEA [   ], VOMITINGS [   ], DIARRHEA [   ], CONSTIPATION [   ]  :  DYSURIA [   ], NOCTURIA [   ], INCREASED FREQUENCY [   ], DRIBLING [   ],  SKELETAL: PAINFUL JOINTS [   ], SWOLLEN JOINTS [   ], NECK ACHE [   ], LOW BACK ACHE [   ],  SKIN : ULCERS [   ], RASH [   ], ITCHING [   ]  CNS: HEAD ACHE [   ], DOUBLE VISION [   ], BLURRED VISION [   ], AMS / CONFUSION [   ], SEIZURES [   ], WEAKNESS [   ],TINGLING / NUMBNESS [   ]        PHYSICAL EXAMINATION:    GENERAL APPEARANCE: NO DISTRESS  HEENT:  NO PALLOR, NO  JVD,  NO   NODES, NECK SUPPLE  CVS: S1 +, S2 +,   RS: AEEB,  OCCASIONAL  RALES +,  RONCHI +  ABD: SOFT, NT, NO, BS +  EXT: NO PE  SKIN: WARM,   SKELETAL:  ROM ACCEPTABLE  CNS:  AAO X 3        RADIOLOGY :    ACC: [id number] EXAM:  XR CHEST PORTABLE IMMED 1V                          PROCEDURE DATE:  2022          INTERPRETATION:  AP chest on 2022 at 2:02 AM. Patient was   intubated.    Heart magnified by technique.    Right lateral chest is excluded from the study.    Minimal infiltrate at right base somewhat increased from .    At endotracheal tube and nasogastric tube in mid inserted.    IMPRESSION: Minimal right base infiltrate. Tubes inserted.      ASSESSMENT :       PLAN:  HPI:  61 year old man from [facility], active smoker, with a PMHx of CAD, CHF on nocturnal BIPAP, COPD (history of multiple intubation in past), peripheral neuropathy, GERD, HTN, HLD, obesity, polyarthritis, Pulmonary HTN, TIA, Vit D Def, and recent readmissionX2 to [facility] for respiratory failure requiring intubation ( 3/9 - 3/15, and ,most recent: 3/18-3/21), re-presented for acute on chronic respiratory failure with hypoxia and hypercapnia. History was limited to NH chart note review , coming in for weakness/AMS as per EMS. pt was not providing any further history. patient was placed on Bi-PAP in ED for ~2hours for acute on chronic hypoxic [id number] respiratory failure , with no improvement in mental status and blood gas , Patient intubated in ED on 3/25/2022 @~1am , right lip 23cm. pt received Bolus NS X2 for soft blood pressure s/p intubation. ICU consulted for close monitoring.      Of note : patient is known for being non adherent with his BiPAP      #  DC PLAN IN AM TO [facility] SINCE HE REQUIRES HIGHER LEVEL OF CARE WHICH [facility] IS UNABLE TO PROVIDE  # DC WITH TAPERING DOSES OF PREDNISONE     # COPD EXACERBATION, ACUTE ON CHRONIC HYPOXIC & HYPERCAPNEIC RESPIRATORY FAILURE S/T NONCOMPLIANCE W/ TOBACCO CESSATION AND W/ BIPAP   - S/P MECHANICAL VENTILATION  - STARTED ON IV STEROIDS [TAPERING], SYMBICORT, SPIRIVA, AZITHROMYCIN, NEBS,  O2 SUPPLEMENTS  - NICOTINE PATCH  - CRITICAL CARE EVALUATION IN PROGRESS    - COUNSELLED PATIENT AT LENGTH, DISCUSSED REGARDING NONCOMPLIANCE W/ SMOKING CESSATION. PATIENT IS HIGH RISK FOR RE-INTUBATION. D/W PATIENT THAT HE IS HIGH RISK FOR RESPIRATORY FAILURE. HE VERBALIZED UNDERSTANDING. HE EXPRESSED THAT HE WOULD ATTEMPT TO ABSTAIN FROM SMOKING AND UTILIZE NICOTINE PATCHES AND WOULD TRY TO REMAIN COMPLIANT WITH NOCTURNAL BIPAP    # CONSTIPATION , URINARY RETENTION - WILL DC AMOR, ADD MIRALAX AND SENNA, TAP WATER ENEMA X 1 TIME       # UNCONTROLLED DM, GLUCOSE INTOLERANCE DUE TO STEROIDS  -  LANTUS, SSI + FS     # SMOKER, COUNSELLED TO QUIT SMOKING     # GI AND DVT PROPHYLAXIS    DR. REFUGIO CABELLO

## 2022-03-27 NOTE — SWALLOW BEDSIDE ASSESSMENT ADULT - CONSISTENCIES ADMINISTERED
cracker x4/regular solid ice chips x 2 applesauce x5/pureed water x1/mildly thick water x3/thin liquid water x2; cracker mixed with applesauce x5/mildly thick/minced & moist

## 2022-03-27 NOTE — PROGRESS NOTE ADULT - PROBLEM SELECTOR PLAN 11
Continue to monitor BP and hold off on antihypertensive for now  BP acceptable thus far  Continue statin

## 2022-03-27 NOTE — PROGRESS NOTE ADULT - SUBJECTIVE AND OBJECTIVE BOX
DEVIKA CARBALLO    SCU progress note    INTERVAL HPI/OVERNIGHT EVENTS: ***    DNR [ ]   DNI  [  ]    Covid - 19 PCR:     The 4Ms    What Matters Most: see GOC  Age appropriate Medications/Screen for High Risk Medication: Yes  Mentation: see CAM below  Mobility: defer to physical exam    The Confusion Assessment Method (CAM) Diagnostic Algorithm     1: Acute Onset or Fluctuating Course  - Is there evidence of an acute change in mental status from the patient’s baseline? Did the (abnormal) behavior  fluctuate during the day, that is, tend to come and go, or increase and decrease in severity?  [ ] YES [ ] NO     2: Inattention  - Did the patient have difficulty focusing attention, being easily distractible, or having difficulty keeping track of what was being said?  [ ] YES [ ] NO     3: Disorganized thinking  -Was the patient’s thinking disorganized or incoherent, such as rambling or irrelevant conversation, unclear or illogical flow of ideas, or unpredictable switching from subject to subject?  [ ] YES [ ] NO    4: Altered Level of consciousness?  [ ] YES [ ] NO    The diagnosis of delirium by CAM requires the presence of features 1 and 2 and either 3 or 4.    PRESSORS: [ ] YES [ ] NO    Cardiovascular:  Heart Failure  Acute   Acute on Chronic  Chronic       tamsulosin 0.4 milliGRAM(s) Oral at bedtime    Pulmonary:  ALBUTerol    90 MICROgram(s) HFA Inhaler 2 Puff(s) Inhalation every 6 hours  budesonide 160 MICROgram(s)/formoterol 4.5 MICROgram(s) Inhaler 2 Puff(s) Inhalation two times a day  tiotropium 18 MICROgram(s) Capsule 1 Capsule(s) Inhalation daily    Hematalogic:  aspirin  chewable 81 milliGRAM(s) Oral daily  enoxaparin Injectable 40 milliGRAM(s) SubCutaneous every 24 hours    Other:  ALPRAZolam 0.5 milliGRAM(s) Oral every 12 hours  atorvastatin 40 milliGRAM(s) Oral at bedtime  bisacodyl Suppository 10 milliGRAM(s) Rectal daily PRN  gabapentin 100 milliGRAM(s) Oral three times a day  glucagon  Injectable 1 milliGRAM(s) IntraMuscular once  insulin glargine Injectable (LANTUS) 10 Unit(s) SubCutaneous every morning  insulin lispro (ADMELOG) corrective regimen sliding scale   SubCutaneous Before meals and at bedtime  melatonin 5 milliGRAM(s) Oral at bedtime  methylPREDNISolone sodium succinate Injectable 40 milliGRAM(s) IV Push every 12 hours  nicotine - 21 mG/24Hr(s) Patch 1 patch Transdermal daily  oxycodone    5 mG/acetaminophen 325 mG 1 Tablet(s) Oral every 6 hours PRN  pantoprazole  Injectable 40 milliGRAM(s) IV Push at bedtime  polyethylene glycol 3350 17 Gram(s) Oral daily  senna 2 Tablet(s) Oral at bedtime    ALBUTerol    90 MICROgram(s) HFA Inhaler 2 Puff(s) Inhalation every 6 hours  ALPRAZolam 0.5 milliGRAM(s) Oral every 12 hours  aspirin  chewable 81 milliGRAM(s) Oral daily  atorvastatin 40 milliGRAM(s) Oral at bedtime  bisacodyl Suppository 10 milliGRAM(s) Rectal daily PRN  budesonide 160 MICROgram(s)/formoterol 4.5 MICROgram(s) Inhaler 2 Puff(s) Inhalation two times a day  enoxaparin Injectable 40 milliGRAM(s) SubCutaneous every 24 hours  gabapentin 100 milliGRAM(s) Oral three times a day  glucagon  Injectable 1 milliGRAM(s) IntraMuscular once  insulin glargine Injectable (LANTUS) 10 Unit(s) SubCutaneous every morning  insulin lispro (ADMELOG) corrective regimen sliding scale   SubCutaneous Before meals and at bedtime  melatonin 5 milliGRAM(s) Oral at bedtime  methylPREDNISolone sodium succinate Injectable 40 milliGRAM(s) IV Push every 12 hours  nicotine - 21 mG/24Hr(s) Patch 1 patch Transdermal daily  oxycodone    5 mG/acetaminophen 325 mG 1 Tablet(s) Oral every 6 hours PRN  pantoprazole  Injectable 40 milliGRAM(s) IV Push at bedtime  polyethylene glycol 3350 17 Gram(s) Oral daily  senna 2 Tablet(s) Oral at bedtime  tamsulosin 0.4 milliGRAM(s) Oral at bedtime  tiotropium 18 MICROgram(s) Capsule 1 Capsule(s) Inhalation daily    Drug Dosing Weight  Height (cm): 172.7 (24 Mar 2022 22:42)  Weight (kg): 93.043 (26 Mar 2022 07:08)  BMI (kg/m2): 31.2 (26 Mar 2022 07:08)  BSA (m2): 2.07 (26 Mar 2022 07:08)    CENTRAL LINE: [ ] YES [ ] NO  LOCATION:   DATE INSERTED:  REMOVE: [ ] YES [ ] NO  EXPLAIN:    AMOR: [ ] YES [ ] NO    DATE INSERTED:  REMOVE:  [ ] YES [ ] NO  EXPLAIN:    PAST MEDICAL & SURGICAL HISTORY:  COPD (chronic obstructive pulmonary disease)  Oxygen 2-3L at home    Stented coronary artery  2017    HLD (hyperlipidemia)    Pulmonary HTN    Type 2 diabetes mellitus without complication, with long-term current use of insulin    Coronary artery disease involving native coronary artery of native heart without angina pectoris    Smoker    Gastroesophageal reflux disease, esophagitis presence not specified    Oxygen dependent    DM (diabetes mellitus)    CAD (coronary artery disease)    GERD (gastroesophageal reflux disease)    Insomnia    CHF (congestive heart failure)    HTN (hypertension)    Mood disorder    No significant past surgical history          ABG - ( 25 Mar 2022 13:01 )  pH, Arterial: 7.44  pH, Blood: x     /  pCO2: 64    /  pO2: 57    / HCO3: 44    / Base Excess: 16.0  /  SaO2: 92                    03-26 @ 07:01  -  03-27 @ 07:00  --------------------------------------------------------  IN: 0 mL / OUT: 1901 mL / NET: -1901 mL            PHYSICAL EXAM:    GENERAL: NAD, well-groomed, well-developed  HEAD:  Atraumatic, Normocephalic  EYES: EOMI, PERRLA, conjunctiva and sclera clear  ENMT: No tonsillar erythema, exudates, or enlargement; Moist mucous membranes, Good dentition, No lesions  NECK: Supple, No JVD, Normal thyroid  NERVOUS SYSTEM:  Alert & Oriented X3, Good concentration; Motor Strength 5/5 B/L upper and lower extremities; DTRs 2+ intact and symmetric  CHEST/LUNG: Clear to percussion bilaterally; No rales, rhonchi, wheezing, or rubs  HEART: Regular rate and rhythm; No murmurs, rubs, or gallops  ABDOMEN: Soft, Nontender, Nondistended; Bowel sounds present  EXTREMITIES:  2+ Peripheral Pulses, No clubbing, cyanosis, or edema  LYMPH: No lymphadenopathy noted  SKIN: No rashes or lesions      LABS:  CBC Full  -  ( 27 Mar 2022 07:25 )  WBC Count : 9.48 K/uL  RBC Count : 4.08 M/uL  Hemoglobin : 11.6 g/dL  Hematocrit : 36.3 %  Platelet Count - Automated : 136 K/uL  Mean Cell Volume : 89.0 fl  Mean Cell Hemoglobin : 28.4 pg  Mean Cell Hemoglobin Concentration : 32.0 gm/dL  Auto Neutrophil # : 7.62 K/uL  Auto Lymphocyte # : 1.18 K/uL  Auto Monocyte # : 0.58 K/uL  Auto Eosinophil # : 0.01 K/uL  Auto Basophil # : 0.01 K/uL  Auto Neutrophil % : 80.5 %  Auto Lymphocyte % : 12.4 %  Auto Monocyte % : 6.1 %  Auto Eosinophil % : 0.1 %  Auto Basophil % : 0.1 %    03-27    133<L>  |  95<L>  |  22<H>  ----------------------------<  287<H>  4.5   |  36<H>  |  0.57    Ca    8.5      27 Mar 2022 07:25  Phos  3.8     03-27  Mg     1.9     03-27    TPro  5.4<L>  /  Alb  2.8<L>  /  TBili  0.6  /  DBili  x   /  AST  9<L>  /  ALT  25  /  AlkPhos  41  03-27              [  ]  DVT Prophylaxis  [  ]  Nutrition, Brand, Rate         Goal Rate        Abnormal Nutritional Status -  Malnutrition   Cachexia      Morbid Obesity BMI >/=40    RADIOLOGY & ADDITIONAL STUDIES:  ***    Goals of Care Discussion with Family/Proxy/Other   - see note from/family meeting set up for...     DEVIKA CARBALLO    SCU progress note    INTERVAL HPI/OVERNIGHT EVENTS: No acute events overnight. Amor draining clear yellow urine.    DNR [ ]   DNI  [  ]- FULL CODE    Covid - 19 PCR: COVID-19 PCR: Juan (24 Mar 2022 23:11)      The 4Ms    What Matters Most: see GOC  Age appropriate Medications/Screen for High Risk Medication: Yes  Mentation: see CAM below  Mobility: defer to physical exam    The Confusion Assessment Method (CAM) Diagnostic Algorithm     1: Acute Onset or Fluctuating Course  - Is there evidence of an acute change in mental status from the patient’s baseline? Did the (abnormal) behavior  fluctuate during the day, that is, tend to come and go, or increase and decrease in severity?  [ ] YES [x ] NO     2: Inattention  - Did the patient have difficulty focusing attention, being easily distractible, or having difficulty keeping track of what was being said?  [ ] YES [x ] NO     3: Disorganized thinking  -Was the patient’s thinking disorganized or incoherent, such as rambling or irrelevant conversation, unclear or illogical flow of ideas, or unpredictable switching from subject to subject?  [ ] YES [x ] NO    4: Altered Level of consciousness?  [ ] YES [x ] NO    The diagnosis of delirium by CAM requires the presence of features 1 and 2 and either 3 or 4.    PRESSORS: [ ] YES [x ] NO    Cardiovascular:  Heart Failure  Acute   Acute on Chronic  Chronic       tamsulosin 0.4 milliGRAM(s) Oral at bedtime    Pulmonary:  ALBUTerol    90 MICROgram(s) HFA Inhaler 2 Puff(s) Inhalation every 6 hours  budesonide 160 MICROgram(s)/formoterol 4.5 MICROgram(s) Inhaler 2 Puff(s) Inhalation two times a day  tiotropium 18 MICROgram(s) Capsule 1 Capsule(s) Inhalation daily    Hematalogic:  aspirin  chewable 81 milliGRAM(s) Oral daily  enoxaparin Injectable 40 milliGRAM(s) SubCutaneous every 24 hours    Other:  ALPRAZolam 0.5 milliGRAM(s) Oral every 12 hours  atorvastatin 40 milliGRAM(s) Oral at bedtime  bisacodyl Suppository 10 milliGRAM(s) Rectal daily PRN  gabapentin 100 milliGRAM(s) Oral three times a day  glucagon  Injectable 1 milliGRAM(s) IntraMuscular once  insulin glargine Injectable (LANTUS) 10 Unit(s) SubCutaneous every morning  insulin lispro (ADMELOG) corrective regimen sliding scale   SubCutaneous Before meals and at bedtime  melatonin 5 milliGRAM(s) Oral at bedtime  methylPREDNISolone sodium succinate Injectable 40 milliGRAM(s) IV Push every 12 hours  nicotine - 21 mG/24Hr(s) Patch 1 patch Transdermal daily  oxycodone    5 mG/acetaminophen 325 mG 1 Tablet(s) Oral every 6 hours PRN  pantoprazole  Injectable 40 milliGRAM(s) IV Push at bedtime  polyethylene glycol 3350 17 Gram(s) Oral daily  senna 2 Tablet(s) Oral at bedtime    ALBUTerol    90 MICROgram(s) HFA Inhaler 2 Puff(s) Inhalation every 6 hours  ALPRAZolam 0.5 milliGRAM(s) Oral every 12 hours  aspirin  chewable 81 milliGRAM(s) Oral daily  atorvastatin 40 milliGRAM(s) Oral at bedtime  bisacodyl Suppository 10 milliGRAM(s) Rectal daily PRN  budesonide 160 MICROgram(s)/formoterol 4.5 MICROgram(s) Inhaler 2 Puff(s) Inhalation two times a day  enoxaparin Injectable 40 milliGRAM(s) SubCutaneous every 24 hours  gabapentin 100 milliGRAM(s) Oral three times a day  glucagon  Injectable 1 milliGRAM(s) IntraMuscular once  insulin glargine Injectable (LANTUS) 10 Unit(s) SubCutaneous every morning  insulin lispro (ADMELOG) corrective regimen sliding scale   SubCutaneous Before meals and at bedtime  melatonin 5 milliGRAM(s) Oral at bedtime  methylPREDNISolone sodium succinate Injectable 40 milliGRAM(s) IV Push every 12 hours  nicotine - 21 mG/24Hr(s) Patch 1 patch Transdermal daily  oxycodone    5 mG/acetaminophen 325 mG 1 Tablet(s) Oral every 6 hours PRN  pantoprazole  Injectable 40 milliGRAM(s) IV Push at bedtime  polyethylene glycol 3350 17 Gram(s) Oral daily  senna 2 Tablet(s) Oral at bedtime  tamsulosin 0.4 milliGRAM(s) Oral at bedtime  tiotropium 18 MICROgram(s) Capsule 1 Capsule(s) Inhalation daily    Drug Dosing Weight  Height (cm): 172.7 (24 Mar 2022 22:42)  Weight (kg): 93.043 (26 Mar 2022 07:08)  BMI (kg/m2): 31.2 (26 Mar 2022 07:08)  BSA (m2): 2.07 (26 Mar 2022 07:08)    CENTRAL LINE: [ ] YES [x ] NO  LOCATION:   DATE INSERTED:  REMOVE: [ ] YES [ ] NO  EXPLAIN:    AMOR: [x ] YES [ ] NO    DATE INSERTED: 03/26/22  REMOVE:  [ ] YES [ ] NO  EXPLAIN: Retention, failed TOV    PAST MEDICAL & SURGICAL HISTORY:  COPD (chronic obstructive pulmonary disease)  Oxygen 2-3L at home    Stented coronary artery  2017    HLD (hyperlipidemia)    Pulmonary HTN    Type 2 diabetes mellitus without complication, with long-term current use of insulin    Coronary artery disease involving native coronary artery of native heart without angina pectoris    Smoker    Gastroesophageal reflux disease, esophagitis presence not specified    Oxygen dependent    DM (diabetes mellitus)    CAD (coronary artery disease)    GERD (gastroesophageal reflux disease)    Insomnia    CHF (congestive heart failure)    HTN (hypertension)    Mood disorder    No significant past surgical history          ABG - ( 25 Mar 2022 13:01 )  pH, Arterial: 7.44  pH, Blood: x     /  pCO2: 64    /  pO2: 57    / HCO3: 44    / Base Excess: 16.0  /  SaO2: 92                    03-26 @ 07:01  -  03-27 @ 07:00  --------------------------------------------------------  IN: 0 mL / OUT: 1901 mL / NET: -1901 mL            PHYSICAL EXAM:    GENERAL: NAD, well-groomed, well-developed  HEAD:  Atraumatic, Normocephalic  EYES: EOMI, PERRLA, conjunctiva and sclera clear  ENMT: No tonsillar erythema, exudates, or enlargement; Moist mucous membranes, Good dentition, No lesions  NECK: Supple, No JVD, Normal thyroid  NERVOUS SYSTEM:  Alert & Oriented X3, Good concentration; Motor Strength 5/5 B/L upper and lower extremities; DTRs 2+ intact and symmetric  CHEST/LUNG: b/l lungs w/ decrease B/l, no wheezing, speaking in full sentences w/o dyspnea  HEART: Regular rate and rhythm; No murmurs, rubs, or gallops  ABDOMEN: Soft, Nontender, Nondistended; Bowel sounds present  EXTREMITIES:  2+ Peripheral Pulses, No clubbing, cyanosis, or edema  LYMPH: No lymphadenopathy noted  SKIN: No rashes or lesions      LABS:  CBC Full  -  ( 27 Mar 2022 07:25 )  WBC Count : 9.48 K/uL  RBC Count : 4.08 M/uL  Hemoglobin : 11.6 g/dL  Hematocrit : 36.3 %  Platelet Count - Automated : 136 K/uL  Mean Cell Volume : 89.0 fl  Mean Cell Hemoglobin : 28.4 pg  Mean Cell Hemoglobin Concentration : 32.0 gm/dL  Auto Neutrophil # : 7.62 K/uL  Auto Lymphocyte # : 1.18 K/uL  Auto Monocyte # : 0.58 K/uL  Auto Eosinophil # : 0.01 K/uL  Auto Basophil # : 0.01 K/uL  Auto Neutrophil % : 80.5 %  Auto Lymphocyte % : 12.4 %  Auto Monocyte % : 6.1 %  Auto Eosinophil % : 0.1 %  Auto Basophil % : 0.1 %    03-27    133<L>  |  95<L>  |  22<H>  ----------------------------<  287<H>  4.5   |  36<H>  |  0.57    Ca    8.5      27 Mar 2022 07:25  Phos  3.8     03-27  Mg     1.9     03-27    TPro  5.4<L>  /  Alb  2.8<L>  /  TBili  0.6  /  DBili  x   /  AST  9<L>  /  ALT  25  /  AlkPhos  41  03-27              [  ]  DVT Prophylaxis  [  ]  Nutrition, Brand, Rate         Goal Rate        Abnormal Nutritional Status -  Malnutrition   Cachexia      Morbid Obesity BMI >/=40    RADIOLOGY & ADDITIONAL STUDIES:  ***    Goals of Care Discussion with Family/Proxy/Other   - see note from/family meeting set up for...

## 2022-03-27 NOTE — PROGRESS NOTE ADULT - PROBLEM SELECTOR PLAN 10
DVT and GI prophylaxis.  AVAPS nightly and as needed during the day.  Continue oxygen support when not using Trilogy.  Noncompliant with Trilogy at [facility].  Needs to follow with Pulmonary as an outpatient.  High risk for reintubation and death secondary to noncompliance.  Rec starting Daliresp DVT and GI prophylaxis.  AVAPS nightly and as needed during the day.  Continue oxygen support when not using Trilogy.  Noncompliant with Trilogy at [facility].  Needs to follow with Pulmonary as an outpatient.  High risk for reintubation and death secondary to noncompliance.  Daliresp 250 mcg daily to start on 3/28

## 2022-03-27 NOTE — SWALLOW BEDSIDE ASSESSMENT ADULT - SWALLOW EVAL: DIAGNOSIS
Pt p/w a moderate luiz-pharyngeal dysphagia c/b reduced a-p transit with suspect delayed swallow initiation and coughing on solids and thin liquids.

## 2022-03-27 NOTE — PROGRESS NOTE ADULT - PROBLEM SELECTOR PLAN 2
End stage GOLD 4D  Continue oxygen support. AVAPS nightly and as needed during the day.  Has Trilogy and oxygen at [facility]  Continue bronchodilators and ICS  Solumedrol 40mg every 12 hours.  Rec starting daliresp 250mg daily for 1 month followed by daliresp 500mg daily. End stage GOLD 4D  Continue oxygen support. AVAPS nightly and as needed during the day.  Has Trilogy and oxygen at [facility]  Continue bronchodilators and ICS  Solumedrol 40mg every 12 hours.  Daliresp 250mcg daily to begin on 3/28 for 1 month and to continue Daliresp 500mg daily if tolerating well.
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
## 2022-03-27 NOTE — SWALLOW BEDSIDE ASSESSMENT ADULT - DIET PRIOR TO ADMI
Pt reports "soft" food and thin liquids; seen by speech last admission on 3/11 and recommended minced and moist/mildly thick liquids

## 2022-03-27 NOTE — SWALLOW BEDSIDE ASSESSMENT ADULT - SWALLOW EVAL: RECOMMENDED FEEDING/EATING TECHNIQUES
allow for swallow between intakes/alternate food with liquid/hard swallow w/ each bite or sip/maintain upright posture during/after eating for 30 mins/no straws/position upright (90 degrees)

## 2022-03-27 NOTE — SWALLOW BEDSIDE ASSESSMENT ADULT - SPECIFY REASON(S)
Subjective assessment of swallowing function to determine least restrictive diet level given hx of dysphagia

## 2022-03-27 NOTE — SWALLOW BEDSIDE ASSESSMENT ADULT - PHARYNGEAL PHASE
Delayed pharyngeal swallow/Decreased laryngeal elevation/Cough post oral intake Decreased laryngeal elevation Within functional limits Delayed pharyngeal swallow/Decreased laryngeal elevation/Wet vocal quality post oral intake/Cough post oral intake

## 2022-03-27 NOTE — CHART NOTE - NSCHARTNOTEFT_GEN_A_CORE
EVENT:   3/26/22, 9:58pm, Pt. c/o constipation likely due to adverse effects of medication. Requested medication.     HPI:       61 year old man from [facility], active smoker, with a PMH of CAD, CHF on nocturnal BIPAP, COPD (history of multiple intubation in past), peripheral neuropathy, GERD, HTN, HLD, obesity, polyarthritis, Pulmonary HTN, TIA, Vit D Def, and recent readmissionX2 to [facility] for respiratory failure requiring intubation ( 3/9 - 3/15, and ,most recent: 3/18-3/21), re-presented for acute on chronic respiratory failure with hypoxia and hypercapnia. History was limited to NH chart note review , coming in for weakness/AMS as per EMS. pt was not providing any further history. patient was placed on Bi-PAP in ED for ~2hours for acute on chronic hypoxic [id number] respiratory failure , with no improvement in mental status and blood gas , Patient intubated in ED on 3/25/2022 @~1am , right lip 23cm. pt received Bolus NS X2 for soft blood pressure s/p intubation. ICU consulted for close monitoring.  OBJECTIVE:  Vital Signs Last 24 Hrs  T(C): 36.6 (26 Mar 2022 21:20), Max: 36.8 (26 Mar 2022 04:42)  T(F): 97.9 (26 Mar 2022 21:20), Max: 98.2 (26 Mar 2022 04:42)  HR: 71 (26 Mar 2022 21:20) (66 - 81)  BP: 129/66 (26 Mar 2022 21:20) (115/61 - 130/68)  BP(mean): 75 (26 Mar 2022 10:23) (75 - 82)  RR: 20 (26 Mar 2022 21:20) (18 - 20)  SpO2: 100% (26 Mar 2022 21:20) (95% - 100%)    FOCUSED PHYSICAL EXAM:    LABS:                        11.0   11.03 )-----------( 145      ( 26 Mar 2022 06:34 )             35.3     03-26    136  |  94<L>  |  22<H>  ----------------------------<  258<H>  4.8   |  40<H>  |  0.59    Ca    8.4      26 Mar 2022 06:34  Phos  2.3     03-25  Mg     2.0     03-25    TPro  5.6<L>  /  Alb  2.8<L>  /  TBili  0.4  /  DBili  x   /  AST  18  /  ALT  29  /  AlkPhos  45  03-25    PLAN:   - Miralax 17 gm po Q Daily,   - Senna 2 tabs po QHS,   - Bisacodyl (Dulcolax) 10 mg, rectal, suppository, PRN for constipation.     FOLLOW UP / RESULT:   - Re-evaluate patient comfort level,   - Continue supportive care,   - Maintain safety and comfort.

## 2022-03-28 LAB
ALBUMIN SERPL ELPH-MCNC: 2.7 G/DL — LOW (ref 3.5–5)
ALP SERPL-CCNC: 41 U/L — SIGNIFICANT CHANGE UP (ref 40–120)
ALT FLD-CCNC: 26 U/L DA — SIGNIFICANT CHANGE UP (ref 10–60)
ANION GAP SERPL CALC-SCNC: 3 MMOL/L — LOW (ref 5–17)
AST SERPL-CCNC: 7 U/L — LOW (ref 10–40)
BASOPHILS # BLD AUTO: 0 K/UL — SIGNIFICANT CHANGE UP (ref 0–0.2)
BASOPHILS NFR BLD AUTO: 0 % — SIGNIFICANT CHANGE UP (ref 0–2)
BILIRUB SERPL-MCNC: 0.5 MG/DL — SIGNIFICANT CHANGE UP (ref 0.2–1.2)
BUN SERPL-MCNC: 19 MG/DL — HIGH (ref 7–18)
CALCIUM SERPL-MCNC: 8.9 MG/DL — SIGNIFICANT CHANGE UP (ref 8.4–10.5)
CHLORIDE SERPL-SCNC: 93 MMOL/L — LOW (ref 96–108)
CO2 SERPL-SCNC: 38 MMOL/L — HIGH (ref 22–31)
CREAT SERPL-MCNC: 0.65 MG/DL — SIGNIFICANT CHANGE UP (ref 0.5–1.3)
EGFR: 109 ML/MIN/1.73M2 — SIGNIFICANT CHANGE UP
EOSINOPHIL # BLD AUTO: 0 K/UL — SIGNIFICANT CHANGE UP (ref 0–0.5)
EOSINOPHIL NFR BLD AUTO: 0 % — SIGNIFICANT CHANGE UP (ref 0–6)
GLUCOSE SERPL-MCNC: 295 MG/DL — HIGH (ref 70–99)
HCT VFR BLD CALC: 36.9 % — LOW (ref 39–50)
HGB BLD-MCNC: 11.7 G/DL — LOW (ref 13–17)
IMM GRANULOCYTES NFR BLD AUTO: 1 % — SIGNIFICANT CHANGE UP (ref 0–1.5)
LYMPHOCYTES # BLD AUTO: 0.6 K/UL — LOW (ref 1–3.3)
LYMPHOCYTES # BLD AUTO: 7.5 % — LOW (ref 13–44)
MCHC RBC-ENTMCNC: 28.3 PG — SIGNIFICANT CHANGE UP (ref 27–34)
MCHC RBC-ENTMCNC: 31.7 GM/DL — LOW (ref 32–36)
MCV RBC AUTO: 89.3 FL — SIGNIFICANT CHANGE UP (ref 80–100)
MONOCYTES # BLD AUTO: 0.5 K/UL — SIGNIFICANT CHANGE UP (ref 0–0.9)
MONOCYTES NFR BLD AUTO: 6.3 % — SIGNIFICANT CHANGE UP (ref 2–14)
NEUTROPHILS # BLD AUTO: 6.82 K/UL — SIGNIFICANT CHANGE UP (ref 1.8–7.4)
NEUTROPHILS NFR BLD AUTO: 85.2 % — HIGH (ref 43–77)
NRBC # BLD: 0 /100 WBCS — SIGNIFICANT CHANGE UP (ref 0–0)
PLATELET # BLD AUTO: 117 K/UL — LOW (ref 150–400)
POTASSIUM SERPL-MCNC: 5 MMOL/L — SIGNIFICANT CHANGE UP (ref 3.5–5.3)
POTASSIUM SERPL-SCNC: 5 MMOL/L — SIGNIFICANT CHANGE UP (ref 3.5–5.3)
PROT SERPL-MCNC: 5.5 G/DL — LOW (ref 6–8.3)
RBC # BLD: 4.13 M/UL — LOW (ref 4.2–5.8)
RBC # FLD: 13.2 % — SIGNIFICANT CHANGE UP (ref 10.3–14.5)
SARS-COV-2 RNA SPEC QL NAA+PROBE: SIGNIFICANT CHANGE UP
SODIUM SERPL-SCNC: 134 MMOL/L — LOW (ref 135–145)
WBC # BLD: 8 K/UL — SIGNIFICANT CHANGE UP (ref 3.8–10.5)
WBC # FLD AUTO: 8 K/UL — SIGNIFICANT CHANGE UP (ref 3.8–10.5)

## 2022-03-28 RX ORDER — TAMSULOSIN HYDROCHLORIDE 0.4 MG/1
1 CAPSULE ORAL
Qty: 0 | Refills: 0 | DISCHARGE
Start: 2022-03-28

## 2022-03-28 RX ORDER — INSULIN LISPRO 100/ML
4 VIAL (ML) SUBCUTANEOUS
Refills: 0 | Status: DISCONTINUED | OUTPATIENT
Start: 2022-03-28 | End: 2022-03-31

## 2022-03-28 RX ORDER — FINASTERIDE 5 MG/1
1 TABLET, FILM COATED ORAL
Qty: 0 | Refills: 0 | DISCHARGE
Start: 2022-03-28

## 2022-03-28 RX ORDER — INSULIN GLARGINE 100 [IU]/ML
8 INJECTION, SOLUTION SUBCUTANEOUS AT BEDTIME
Refills: 0 | Status: DISCONTINUED | OUTPATIENT
Start: 2022-03-28 | End: 2022-03-30

## 2022-03-28 RX ORDER — AZITHROMYCIN 500 MG/1
1 TABLET, FILM COATED ORAL
Qty: 0 | Refills: 0 | DISCHARGE

## 2022-03-28 RX ORDER — ROFLUMILAST 500 UG/1
0.5 TABLET ORAL
Qty: 0 | Refills: 0 | DISCHARGE
Start: 2022-03-28 | End: 2022-04-28

## 2022-03-28 RX ORDER — POLYETHYLENE GLYCOL 3350 17 G/17G
0 POWDER, FOR SOLUTION ORAL
Qty: 0 | Refills: 0 | DISCHARGE

## 2022-03-28 RX ADMIN — Medication 5 MILLIGRAM(S): at 22:59

## 2022-03-28 RX ADMIN — OXYCODONE AND ACETAMINOPHEN 1 TABLET(S): 5; 325 TABLET ORAL at 21:00

## 2022-03-28 RX ADMIN — Medication 81 MILLIGRAM(S): at 12:07

## 2022-03-28 RX ADMIN — INSULIN GLARGINE 8 UNIT(S): 100 INJECTION, SOLUTION SUBCUTANEOUS at 22:59

## 2022-03-28 RX ADMIN — OXYCODONE AND ACETAMINOPHEN 1 TABLET(S): 5; 325 TABLET ORAL at 12:13

## 2022-03-28 RX ADMIN — ROFLUMILAST 250 MICROGRAM(S): 500 TABLET ORAL at 12:09

## 2022-03-28 RX ADMIN — INSULIN GLARGINE 10 UNIT(S): 100 INJECTION, SOLUTION SUBCUTANEOUS at 08:31

## 2022-03-28 RX ADMIN — BUDESONIDE AND FORMOTEROL FUMARATE DIHYDRATE 2 PUFF(S): 160; 4.5 AEROSOL RESPIRATORY (INHALATION) at 12:15

## 2022-03-28 RX ADMIN — Medication 3: at 08:09

## 2022-03-28 RX ADMIN — OXYCODONE AND ACETAMINOPHEN 1 TABLET(S): 5; 325 TABLET ORAL at 20:07

## 2022-03-28 RX ADMIN — Medication 1 PATCH: at 07:59

## 2022-03-28 RX ADMIN — ATORVASTATIN CALCIUM 40 MILLIGRAM(S): 80 TABLET, FILM COATED ORAL at 23:03

## 2022-03-28 RX ADMIN — Medication 6: at 17:18

## 2022-03-28 RX ADMIN — SENNA PLUS 2 TABLET(S): 8.6 TABLET ORAL at 22:58

## 2022-03-28 RX ADMIN — PANTOPRAZOLE SODIUM 40 MILLIGRAM(S): 20 TABLET, DELAYED RELEASE ORAL at 22:58

## 2022-03-28 RX ADMIN — Medication 0.5 MILLIGRAM(S): at 17:23

## 2022-03-28 RX ADMIN — GABAPENTIN 100 MILLIGRAM(S): 400 CAPSULE ORAL at 22:59

## 2022-03-28 RX ADMIN — Medication 0.5 MILLIGRAM(S): at 05:27

## 2022-03-28 RX ADMIN — GABAPENTIN 100 MILLIGRAM(S): 400 CAPSULE ORAL at 13:16

## 2022-03-28 RX ADMIN — Medication 2: at 23:02

## 2022-03-28 RX ADMIN — TAMSULOSIN HYDROCHLORIDE 0.4 MILLIGRAM(S): 0.4 CAPSULE ORAL at 17:25

## 2022-03-28 RX ADMIN — TAMSULOSIN HYDROCHLORIDE 0.4 MILLIGRAM(S): 0.4 CAPSULE ORAL at 05:30

## 2022-03-28 RX ADMIN — ENOXAPARIN SODIUM 40 MILLIGRAM(S): 100 INJECTION SUBCUTANEOUS at 05:27

## 2022-03-28 RX ADMIN — OXYCODONE AND ACETAMINOPHEN 1 TABLET(S): 5; 325 TABLET ORAL at 12:45

## 2022-03-28 RX ADMIN — GABAPENTIN 100 MILLIGRAM(S): 400 CAPSULE ORAL at 05:28

## 2022-03-28 RX ADMIN — Medication 40 MILLIGRAM(S): at 05:29

## 2022-03-28 RX ADMIN — Medication 4 UNIT(S): at 17:20

## 2022-03-28 RX ADMIN — Medication 1 PATCH: at 12:22

## 2022-03-28 RX ADMIN — BUDESONIDE AND FORMOTEROL FUMARATE DIHYDRATE 2 PUFF(S): 160; 4.5 AEROSOL RESPIRATORY (INHALATION) at 22:57

## 2022-03-28 RX ADMIN — FINASTERIDE 5 MILLIGRAM(S): 5 TABLET, FILM COATED ORAL at 12:09

## 2022-03-28 RX ADMIN — Medication 6: at 12:07

## 2022-03-28 NOTE — PROGRESS NOTE ADULT - PROBLEM SELECTOR PLAN 2
End stage GOLD 4D  Continue oxygen support. AVAPS nightly and as needed during the day.  Has Trilogy and oxygen at [facility]  Continue bronchodilators and ICS  Solumedrol 40mg every 12 hours.  Daliresp 250mcg daily to begin on 3/28 for 1 month and to continue Daliresp 500mg daily if tolerating well.

## 2022-03-28 NOTE — PROGRESS NOTE ADULT - SUBJECTIVE AND OBJECTIVE BOX
DEVIKA CARBALLO    SCU progress note    INTERVAL HPI/OVERNIGHT EVENTS: ***No overnight events    DNR [ ]   DNI  [  ]   FULL CODE    Covid - 19 PCR: Negative 3/24    The 4Ms    What Matters Most: see GOC  Age appropriate Medications/Screen for High Risk Medication: Yes  Mentation: see CAM below  Mobility: defer to physical exam    The Confusion Assessment Method (CAM) Diagnostic Algorithm     1: Acute Onset or Fluctuating Course  - Is there evidence of an acute change in mental status from the patient’s baseline? Did the (abnormal) behavior  fluctuate during the day, that is, tend to come and go, or increase and decrease in severity?  [ ] YES [x ] NO     2: Inattention  - Did the patient have difficulty focusing attention, being easily distractible, or having difficulty keeping track of what was being said?  [ ] YES [x ] NO     3: Disorganized thinking  -Was the patient’s thinking disorganized or incoherent, such as rambling or irrelevant conversation, unclear or illogical flow of ideas, or unpredictable switching from subject to subject?  [ ] YES [x ] NO    4: Altered Level of consciousness?  [ ] YES [x ] NO    The diagnosis of delirium by CAM requires the presence of features 1 and 2 and either 3 or 4.    PRESSORS: [ ] YES [x ] NO    Cardiovascular:  Heart Failure  Acute   Acute on Chronic  Chronic       tamsulosin 0.4 milliGRAM(s) Oral two times a day    Pulmonary:  ALBUTerol    90 MICROgram(s) HFA Inhaler 2 Puff(s) Inhalation every 6 hours  budesonide 160 MICROgram(s)/formoterol 4.5 MICROgram(s) Inhaler 2 Puff(s) Inhalation two times a day  roflumilast 250 MICROGram(s) Oral daily  tiotropium 18 MICROgram(s) Capsule 1 Capsule(s) Inhalation daily    Hematalogic:  aspirin  chewable 81 milliGRAM(s) Oral daily  enoxaparin Injectable 40 milliGRAM(s) SubCutaneous every 24 hours    Other:  ALPRAZolam 0.5 milliGRAM(s) Oral every 12 hours  atorvastatin 40 milliGRAM(s) Oral at bedtime  bisacodyl Suppository 10 milliGRAM(s) Rectal daily PRN  finasteride 5 milliGRAM(s) Oral daily  gabapentin 100 milliGRAM(s) Oral three times a day  glucagon  Injectable 1 milliGRAM(s) IntraMuscular once  insulin glargine Injectable (LANTUS) 10 Unit(s) SubCutaneous every morning  insulin lispro (ADMELOG) corrective regimen sliding scale   SubCutaneous Before meals and at bedtime  melatonin 5 milliGRAM(s) Oral at bedtime  nicotine - 21 mG/24Hr(s) Patch 1 patch Transdermal daily  oxycodone    5 mG/acetaminophen 325 mG 1 Tablet(s) Oral every 6 hours PRN  pantoprazole  Injectable 40 milliGRAM(s) IV Push at bedtime  polyethylene glycol 3350 17 Gram(s) Oral daily  senna 2 Tablet(s) Oral at bedtime    ALBUTerol    90 MICROgram(s) HFA Inhaler 2 Puff(s) Inhalation every 6 hours  ALPRAZolam 0.5 milliGRAM(s) Oral every 12 hours  aspirin  chewable 81 milliGRAM(s) Oral daily  atorvastatin 40 milliGRAM(s) Oral at bedtime  bisacodyl Suppository 10 milliGRAM(s) Rectal daily PRN  budesonide 160 MICROgram(s)/formoterol 4.5 MICROgram(s) Inhaler 2 Puff(s) Inhalation two times a day  enoxaparin Injectable 40 milliGRAM(s) SubCutaneous every 24 hours  finasteride 5 milliGRAM(s) Oral daily  gabapentin 100 milliGRAM(s) Oral three times a day  glucagon  Injectable 1 milliGRAM(s) IntraMuscular once  insulin glargine Injectable (LANTUS) 10 Unit(s) SubCutaneous every morning  insulin lispro (ADMELOG) corrective regimen sliding scale   SubCutaneous Before meals and at bedtime  melatonin 5 milliGRAM(s) Oral at bedtime  nicotine - 21 mG/24Hr(s) Patch 1 patch Transdermal daily  oxycodone    5 mG/acetaminophen 325 mG 1 Tablet(s) Oral every 6 hours PRN  pantoprazole  Injectable 40 milliGRAM(s) IV Push at bedtime  polyethylene glycol 3350 17 Gram(s) Oral daily  roflumilast 250 MICROGram(s) Oral daily  senna 2 Tablet(s) Oral at bedtime  tamsulosin 0.4 milliGRAM(s) Oral two times a day  tiotropium 18 MICROgram(s) Capsule 1 Capsule(s) Inhalation daily    Drug Dosing Weight  Height (cm): 172.7 (24 Mar 2022 22:42)  Weight (kg): 93.043 (26 Mar 2022 07:08)  BMI (kg/m2): 31.2 (26 Mar 2022 07:08)  BSA (m2): 2.07 (26 Mar 2022 07:08)    CENTRAL LINE: [ ] YES [x ] NO  LOCATION:   DATE INSERTED:  REMOVE: [ ] YES [ ] NO  EXPLAIN:    AMOR: [x ] YES [ ] NO    DATE INSERTED:  REMOVE:  [ ] YES [x ] NO  EXPLAIN:  urinary retention. Failed TOV    PAST MEDICAL & SURGICAL HISTORY:  COPD (chronic obstructive pulmonary disease)  Oxygen 2-3L at home    Stented coronary artery  2017    HLD (hyperlipidemia)    Pulmonary HTN    Type 2 diabetes mellitus without complication, with long-term current use of insulin    Coronary artery disease involving native coronary artery of native heart without angina pectoris    Smoker    Gastroesophageal reflux disease, esophagitis presence not specified    Oxygen dependent    DM (diabetes mellitus)    CAD (coronary artery disease)    GERD (gastroesophageal reflux disease)    Insomnia    CHF (congestive heart failure)    HTN (hypertension)    Mood disorder    No significant past surgical history                03-27 @ 07:01  -  03-28 @ 07:00  --------------------------------------------------------  IN: 0 mL / OUT: 2002 mL / NET: -2002 mL            PHYSICAL EXAM:    GENERAL: NAD, obese  HEAD:  Atraumatic, Normocephalic  EYES: EOMI, PERRLA, conjunctiva and sclera clear  ENMT: No tonsillar erythema, exudates  NECK: Supple, No JVD  NERVOUS SYSTEM:  Alert & Oriented X3, Follows commands, moving all extremities  CHEST/LUNG: Diminished breath sounds throughout. No wheezing or crackles noted.  HEART: Regular rate and rhythm; No murmurs, rubs, or gallops  ABDOMEN: Soft, Obese,  Nontender, Nondistended; Bowel sounds present  EXTREMITIES:  2+ Peripheral Pulses, No clubbing, cyanosis, or edema  LYMPH: No lymphadenopathy noted  SKIN: No rashes or lesions      LABS:  CBC Full  -  ( 27 Mar 2022 07:25 )  WBC Count : 9.48 K/uL  RBC Count : 4.08 M/uL  Hemoglobin : 11.6 g/dL  Hematocrit : 36.3 %  Platelet Count - Automated : 136 K/uL  Mean Cell Volume : 89.0 fl  Mean Cell Hemoglobin : 28.4 pg  Mean Cell Hemoglobin Concentration : 32.0 gm/dL  Auto Neutrophil # : 7.62 K/uL  Auto Lymphocyte # : 1.18 K/uL  Auto Monocyte # : 0.58 K/uL  Auto Eosinophil # : 0.01 K/uL  Auto Basophil # : 0.01 K/uL  Auto Neutrophil % : 80.5 %  Auto Lymphocyte % : 12.4 %  Auto Monocyte % : 6.1 %  Auto Eosinophil % : 0.1 %  Auto Basophil % : 0.1 %    03-27    133<L>  |  95<L>  |  22<H>  ----------------------------<  287<H>  4.5   |  36<H>  |  0.57    Ca    8.5      27 Mar 2022 07:25  Phos  3.8     03-27  Mg     1.9     03-27    TPro  5.4<L>  /  Alb  2.8<L>  /  TBili  0.6  /  DBili  x   /  AST  9<L>  /  ALT  25  /  AlkPhos  41  03-27              [  ]  DVT Prophylaxis  [  ]  Nutrition, Brand, Rate         Goal Rate        Abnormal Nutritional Status -  Malnutrition   Cachexia          RADIOLOGY & ADDITIONAL STUDIES:  ***  < from: Xray Chest 1 View-PORTABLE IMMEDIATE (Xray Chest 1 View-PORTABLE IMMEDIATE .) (03.25.22 @ 02:55) >  Heart magnified by technique.    Right lateral chest is excluded from the study.    Minimal infiltrate at right base somewhat increased from March 24.    At endotracheal tube and nasogastric tube in mid inserted.    IMPRESSION: Minimal right base infiltrate. Tubes inserted.      < end of copied text >    Goals of Care Discussion with Family/Proxy/Other   - see note from 3/20 and 3/25     DEVIKA CARBALLO    SCU progress note    INTERVAL HPI/OVERNIGHT EVENTS: ***No overnight events    DNR [ ]   DNI  [  ]   FULL CODE    Covid - 19 PCR: Negative 3/24    The 4Ms    What Matters Most: see GOC  Age appropriate Medications/Screen for High Risk Medication: Yes  Mentation: see CAM below  Mobility: defer to physical exam    The Confusion Assessment Method (CAM) Diagnostic Algorithm     1: Acute Onset or Fluctuating Course  - Is there evidence of an acute change in mental status from the patient’s baseline? Did the (abnormal) behavior  fluctuate during the day, that is, tend to come and go, or increase and decrease in severity?  [ ] YES [x ] NO     2: Inattention  - Did the patient have difficulty focusing attention, being easily distractible, or having difficulty keeping track of what was being said?  [ ] YES [x ] NO     3: Disorganized thinking  -Was the patient’s thinking disorganized or incoherent, such as rambling or irrelevant conversation, unclear or illogical flow of ideas, or unpredictable switching from subject to subject?  [ ] YES [x ] NO    4: Altered Level of consciousness?  [ ] YES [x ] NO    The diagnosis of delirium by CAM requires the presence of features 1 and 2 and either 3 or 4.    PRESSORS: [ ] YES [x ] NO    Cardiovascular:  Heart Failure  Acute   Acute on Chronic  Chronic       tamsulosin 0.4 milliGRAM(s) Oral two times a day    Pulmonary:  ALBUTerol    90 MICROgram(s) HFA Inhaler 2 Puff(s) Inhalation every 6 hours  budesonide 160 MICROgram(s)/formoterol 4.5 MICROgram(s) Inhaler 2 Puff(s) Inhalation two times a day  roflumilast 250 MICROGram(s) Oral daily  tiotropium 18 MICROgram(s) Capsule 1 Capsule(s) Inhalation daily    Hematalogic:  aspirin  chewable 81 milliGRAM(s) Oral daily  enoxaparin Injectable 40 milliGRAM(s) SubCutaneous every 24 hours    Other:  ALPRAZolam 0.5 milliGRAM(s) Oral every 12 hours  atorvastatin 40 milliGRAM(s) Oral at bedtime  bisacodyl Suppository 10 milliGRAM(s) Rectal daily PRN  finasteride 5 milliGRAM(s) Oral daily  gabapentin 100 milliGRAM(s) Oral three times a day  glucagon  Injectable 1 milliGRAM(s) IntraMuscular once  insulin glargine Injectable (LANTUS) 10 Unit(s) SubCutaneous every morning  insulin lispro (ADMELOG) corrective regimen sliding scale   SubCutaneous Before meals and at bedtime  melatonin 5 milliGRAM(s) Oral at bedtime  nicotine - 21 mG/24Hr(s) Patch 1 patch Transdermal daily  oxycodone    5 mG/acetaminophen 325 mG 1 Tablet(s) Oral every 6 hours PRN  pantoprazole  Injectable 40 milliGRAM(s) IV Push at bedtime  polyethylene glycol 3350 17 Gram(s) Oral daily  senna 2 Tablet(s) Oral at bedtime    ALBUTerol    90 MICROgram(s) HFA Inhaler 2 Puff(s) Inhalation every 6 hours  ALPRAZolam 0.5 milliGRAM(s) Oral every 12 hours  aspirin  chewable 81 milliGRAM(s) Oral daily  atorvastatin 40 milliGRAM(s) Oral at bedtime  bisacodyl Suppository 10 milliGRAM(s) Rectal daily PRN  budesonide 160 MICROgram(s)/formoterol 4.5 MICROgram(s) Inhaler 2 Puff(s) Inhalation two times a day  enoxaparin Injectable 40 milliGRAM(s) SubCutaneous every 24 hours  finasteride 5 milliGRAM(s) Oral daily  gabapentin 100 milliGRAM(s) Oral three times a day  glucagon  Injectable 1 milliGRAM(s) IntraMuscular once  insulin glargine Injectable (LANTUS) 10 Unit(s) SubCutaneous every morning  insulin lispro (ADMELOG) corrective regimen sliding scale   SubCutaneous Before meals and at bedtime  melatonin 5 milliGRAM(s) Oral at bedtime  nicotine - 21 mG/24Hr(s) Patch 1 patch Transdermal daily  oxycodone    5 mG/acetaminophen 325 mG 1 Tablet(s) Oral every 6 hours PRN  pantoprazole  Injectable 40 milliGRAM(s) IV Push at bedtime  polyethylene glycol 3350 17 Gram(s) Oral daily  roflumilast 250 MICROGram(s) Oral daily  senna 2 Tablet(s) Oral at bedtime  tamsulosin 0.4 milliGRAM(s) Oral two times a day  tiotropium 18 MICROgram(s) Capsule 1 Capsule(s) Inhalation daily    Drug Dosing Weight  Height (cm): 172.7 (24 Mar 2022 22:42)  Weight (kg): 93.043 (26 Mar 2022 07:08)  BMI (kg/m2): 31.2 (26 Mar 2022 07:08)  BSA (m2): 2.07 (26 Mar 2022 07:08)    CENTRAL LINE: [ ] YES [x ] NO  LOCATION:   DATE INSERTED:  REMOVE: [ ] YES [ ] NO  EXPLAIN:    AMOR: [ ] YES [x ] NO    DATE INSERTED:  REMOVE:  [ ] YES [ ] NO  EXPLAIN:    PAST MEDICAL & SURGICAL HISTORY:  COPD (chronic obstructive pulmonary disease)  Oxygen 2-3L at home    Stented coronary artery  2017    HLD (hyperlipidemia)    Pulmonary HTN    Type 2 diabetes mellitus without complication, with long-term current use of insulin    Coronary artery disease involving native coronary artery of native heart without angina pectoris    Smoker    Gastroesophageal reflux disease, esophagitis presence not specified    Oxygen dependent    DM (diabetes mellitus)    CAD (coronary artery disease)    GERD (gastroesophageal reflux disease)    Insomnia    CHF (congestive heart failure)    HTN (hypertension)    Mood disorder    No significant past surgical history                03-27 @ 07:01  -  03-28 @ 07:00  --------------------------------------------------------  IN: 0 mL / OUT: 2002 mL / NET: -2002 mL            PHYSICAL EXAM:    GENERAL: NAD, obese  HEAD:  Atraumatic, Normocephalic  EYES: EOMI, PERRLA, conjunctiva and sclera clear  ENMT: No tonsillar erythema, exudates  NECK: Supple, No JVD  NERVOUS SYSTEM:  Alert & Oriented X3, Follows commands, moving all extremities  CHEST/LUNG: Diminished breath sounds throughout. No wheezing or crackles noted.  HEART: Regular rate and rhythm; No murmurs, rubs, or gallops  ABDOMEN: Soft, Obese,  Nontender, Nondistended; Bowel sounds present  EXTREMITIES:  2+ Peripheral Pulses, No clubbing, cyanosis, or edema  LYMPH: No lymphadenopathy noted  SKIN: No rashes or lesions      LABS:  CBC Full  -  ( 27 Mar 2022 07:25 )  WBC Count : 9.48 K/uL  RBC Count : 4.08 M/uL  Hemoglobin : 11.6 g/dL  Hematocrit : 36.3 %  Platelet Count - Automated : 136 K/uL  Mean Cell Volume : 89.0 fl  Mean Cell Hemoglobin : 28.4 pg  Mean Cell Hemoglobin Concentration : 32.0 gm/dL  Auto Neutrophil # : 7.62 K/uL  Auto Lymphocyte # : 1.18 K/uL  Auto Monocyte # : 0.58 K/uL  Auto Eosinophil # : 0.01 K/uL  Auto Basophil # : 0.01 K/uL  Auto Neutrophil % : 80.5 %  Auto Lymphocyte % : 12.4 %  Auto Monocyte % : 6.1 %  Auto Eosinophil % : 0.1 %  Auto Basophil % : 0.1 %    03-27    133<L>  |  95<L>  |  22<H>  ----------------------------<  287<H>  4.5   |  36<H>  |  0.57    Ca    8.5      27 Mar 2022 07:25  Phos  3.8     03-27  Mg     1.9     03-27    TPro  5.4<L>  /  Alb  2.8<L>  /  TBili  0.6  /  DBili  x   /  AST  9<L>  /  ALT  25  /  AlkPhos  41  03-27              [  ]  DVT Prophylaxis  [  ]  Nutrition, Brand, Rate         Goal Rate        Abnormal Nutritional Status -  Malnutrition   Cachexia          RADIOLOGY & ADDITIONAL STUDIES:  ***  < from: Xray Chest 1 View-PORTABLE IMMEDIATE (Xray Chest 1 View-PORTABLE IMMEDIATE .) (03.25.22 @ 02:55) >  Heart magnified by technique.    Right lateral chest is excluded from the study.    Minimal infiltrate at right base somewhat increased from March 24.    At endotracheal tube and nasogastric tube in mid inserted.    IMPRESSION: Minimal right base infiltrate. Tubes inserted.      < end of copied text >    Goals of Care Discussion with Family/Proxy/Other   - see note from 3/20 and 3/25     DEVIKA CARBALLO    SCU progress note    INTERVAL HPI/OVERNIGHT EVENTS: ***No overnight events    DNR [ ]   DNI  [  ]   FULL CODE    Covid - 19 PCR: Negative 3/24    The 4Ms    What Matters Most: see GOC  Age appropriate Medications/Screen for High Risk Medication: Yes  Mentation: see CAM below  Mobility: defer to physical exam    The Confusion Assessment Method (CAM) Diagnostic Algorithm     1: Acute Onset or Fluctuating Course  - Is there evidence of an acute change in mental status from the patient’s baseline? Did the (abnormal) behavior  fluctuate during the day, that is, tend to come and go, or increase and decrease in severity?  [ ] YES [x ] NO     2: Inattention  - Did the patient have difficulty focusing attention, being easily distractible, or having difficulty keeping track of what was being said?  [ ] YES [x ] NO     3: Disorganized thinking  -Was the patient’s thinking disorganized or incoherent, such as rambling or irrelevant conversation, unclear or illogical flow of ideas, or unpredictable switching from subject to subject?  [ ] YES [x ] NO    4: Altered Level of consciousness?  [ ] YES [x ] NO    The diagnosis of delirium by CAM requires the presence of features 1 and 2 and either 3 or 4.    PRESSORS: [ ] YES [x ] NO    Cardiovascular:  Heart Failure  Acute   Acute on Chronic  Chronic       tamsulosin 0.4 milliGRAM(s) Oral two times a day    Pulmonary:  ALBUTerol    90 MICROgram(s) HFA Inhaler 2 Puff(s) Inhalation every 6 hours  budesonide 160 MICROgram(s)/formoterol 4.5 MICROgram(s) Inhaler 2 Puff(s) Inhalation two times a day  roflumilast 250 MICROGram(s) Oral daily  tiotropium 18 MICROgram(s) Capsule 1 Capsule(s) Inhalation daily    Hematalogic:  aspirin  chewable 81 milliGRAM(s) Oral daily  enoxaparin Injectable 40 milliGRAM(s) SubCutaneous every 24 hours    Other:  ALPRAZolam 0.5 milliGRAM(s) Oral every 12 hours  atorvastatin 40 milliGRAM(s) Oral at bedtime  bisacodyl Suppository 10 milliGRAM(s) Rectal daily PRN  finasteride 5 milliGRAM(s) Oral daily  gabapentin 100 milliGRAM(s) Oral three times a day  glucagon  Injectable 1 milliGRAM(s) IntraMuscular once  insulin glargine Injectable (LANTUS) 10 Unit(s) SubCutaneous every morning  insulin lispro (ADMELOG) corrective regimen sliding scale   SubCutaneous Before meals and at bedtime  melatonin 5 milliGRAM(s) Oral at bedtime  nicotine - 21 mG/24Hr(s) Patch 1 patch Transdermal daily  oxycodone    5 mG/acetaminophen 325 mG 1 Tablet(s) Oral every 6 hours PRN  pantoprazole  Injectable 40 milliGRAM(s) IV Push at bedtime  polyethylene glycol 3350 17 Gram(s) Oral daily  senna 2 Tablet(s) Oral at bedtime    ALBUTerol    90 MICROgram(s) HFA Inhaler 2 Puff(s) Inhalation every 6 hours  ALPRAZolam 0.5 milliGRAM(s) Oral every 12 hours  aspirin  chewable 81 milliGRAM(s) Oral daily  atorvastatin 40 milliGRAM(s) Oral at bedtime  bisacodyl Suppository 10 milliGRAM(s) Rectal daily PRN  budesonide 160 MICROgram(s)/formoterol 4.5 MICROgram(s) Inhaler 2 Puff(s) Inhalation two times a day  enoxaparin Injectable 40 milliGRAM(s) SubCutaneous every 24 hours  finasteride 5 milliGRAM(s) Oral daily  gabapentin 100 milliGRAM(s) Oral three times a day  glucagon  Injectable 1 milliGRAM(s) IntraMuscular once  insulin glargine Injectable (LANTUS) 10 Unit(s) SubCutaneous every morning  insulin lispro (ADMELOG) corrective regimen sliding scale   SubCutaneous Before meals and at bedtime  melatonin 5 milliGRAM(s) Oral at bedtime  nicotine - 21 mG/24Hr(s) Patch 1 patch Transdermal daily  oxycodone    5 mG/acetaminophen 325 mG 1 Tablet(s) Oral every 6 hours PRN  pantoprazole  Injectable 40 milliGRAM(s) IV Push at bedtime  polyethylene glycol 3350 17 Gram(s) Oral daily  roflumilast 250 MICROGram(s) Oral daily  senna 2 Tablet(s) Oral at bedtime  tamsulosin 0.4 milliGRAM(s) Oral two times a day  tiotropium 18 MICROgram(s) Capsule 1 Capsule(s) Inhalation daily    Drug Dosing Weight  Height (cm): 172.7 (24 Mar 2022 22:42)  Weight (kg): 93.043 (26 Mar 2022 07:08)  BMI (kg/m2): 31.2 (26 Mar 2022 07:08)  BSA (m2): 2.07 (26 Mar 2022 07:08)    CENTRAL LINE: [ ] YES [x ] NO  LOCATION:   DATE INSERTED:  REMOVE: [ ] YES [ ] NO  EXPLAIN:    MT: [ ] YES [x ] NO    DATE INSERTED:  REMOVE:  [ ] YES [ ] NO  EXPLAIN:    PAST MEDICAL & SURGICAL HISTORY:  COPD (chronic obstructive pulmonary disease)  Oxygen 2-3L at home    Stented coronary artery  2017    HLD (hyperlipidemia)    Pulmonary HTN    Type 2 diabetes mellitus without complication, with long-term current use of insulin    Coronary artery disease involving native coronary artery of native heart without angina pectoris    Smoker    Gastroesophageal reflux disease, esophagitis presence not specified    Oxygen dependent    DM (diabetes mellitus)    CAD (coronary artery disease)    GERD (gastroesophageal reflux disease)    Insomnia    CHF (congestive heart failure)    HTN (hypertension)    Mood disorder    No significant past surgical history                03-27 @ 07:01  -  03-28 @ 07:00  --------------------------------------------------------  IN: 0 mL / OUT: 2002 mL / NET: -2002 mL            PHYSICAL EXAM:    GENERAL: NAD, obese  HEAD:  Atraumatic, Normocephalic  EYES: EOMI, PERRLA, conjunctiva and sclera clear  ENMT: No tonsillar erythema, exudates  NECK: Supple, No JVD  NERVOUS SYSTEM:  Alert & Oriented X3, Follows commands, moving all extremities  CHEST/LUNG: Diminished breath sounds throughout. No wheezing or crackles noted.  HEART: Regular rate and rhythm; No murmurs, rubs, or gallops  ABDOMEN: Soft, Obese,  Nontender, Nondistended; Bowel sounds present  EXTREMITIES:  2+ Peripheral Pulses, No clubbing, cyanosis, or edema  LYMPH: No lymphadenopathy noted  SKIN: Stage 1 Pressure Injury to the Bilateral Ischium,      LABS:  CBC Full  -  ( 27 Mar 2022 07:25 )  WBC Count : 9.48 K/uL  RBC Count : 4.08 M/uL  Hemoglobin : 11.6 g/dL  Hematocrit : 36.3 %  Platelet Count - Automated : 136 K/uL  Mean Cell Volume : 89.0 fl  Mean Cell Hemoglobin : 28.4 pg  Mean Cell Hemoglobin Concentration : 32.0 gm/dL  Auto Neutrophil # : 7.62 K/uL  Auto Lymphocyte # : 1.18 K/uL  Auto Monocyte # : 0.58 K/uL  Auto Eosinophil # : 0.01 K/uL  Auto Basophil # : 0.01 K/uL  Auto Neutrophil % : 80.5 %  Auto Lymphocyte % : 12.4 %  Auto Monocyte % : 6.1 %  Auto Eosinophil % : 0.1 %  Auto Basophil % : 0.1 %    03-27    133<L>  |  95<L>  |  22<H>  ----------------------------<  287<H>  4.5   |  36<H>  |  0.57    Ca    8.5      27 Mar 2022 07:25  Phos  3.8     03-27  Mg     1.9     03-27    TPro  5.4<L>  /  Alb  2.8<L>  /  TBili  0.6  /  DBili  x   /  AST  9<L>  /  ALT  25  /  AlkPhos  41  03-27              [  ]  DVT Prophylaxis  [  ]  Nutrition, Brand, Rate         Goal Rate        Abnormal Nutritional Status -  Malnutrition   Cachexia          RADIOLOGY & ADDITIONAL STUDIES:  ***  < from: Xray Chest 1 View-PORTABLE IMMEDIATE (Xray Chest 1 View-PORTABLE IMMEDIATE .) (03.25.22 @ 02:55) >  Heart magnified by technique.    Right lateral chest is excluded from the study.    Minimal infiltrate at right base somewhat increased from March 24.    At endotracheal tube and nasogastric tube in mid inserted.    IMPRESSION: Minimal right base infiltrate. Tubes inserted.      < end of copied text >    Goals of Care Discussion with Family/Proxy/Other   - see note from 3/20 and 3/25

## 2022-03-28 NOTE — ADVANCED PRACTICE NURSE CONSULT - RECOMMEDATIONS
-Clean all wounds with normal saline and apply skin prep to the surrounding skin  -Apply a Foam dressing to the Bilateral Ischial areas Q 72hrs PRN  -Elevate/float the patients heels using heel protectors and reposition the patient Q 2hrs using wedges or pillows

## 2022-03-28 NOTE — ADVANCED PRACTICE NURSE CONSULT - ASSESSMENT
This is a 61yr old male patient admitted for Acute Respiratory Failure, presenting with a Stage 1 Pressure Injury to the Bilateral Ischium, as evident by non-blanchable erythema

## 2022-03-28 NOTE — PROGRESS NOTE ADULT - PROBLEM SELECTOR PLAN 10
DVT and GI prophylaxis.  AVAPS nightly and as needed during the day.  Continue oxygen support when not using Trilogy.  Noncompliant with Trilogy at [facility].  Needs to follow with Pulmonary as an outpatient.  High risk for reintubation and death secondary to noncompliance.  Daliresp 250 mcg daily to start on 3/28. DVT and GI prophylaxis.  AVAPS nightly and as needed during the day.  Continue oxygen support when not using Trilogy.  Noncompliant with Trilogy at [facility].  Needs to follow with Pulmonary as an outpatient.  High risk for reintubation and death secondary to noncompliance.  Daliresp 250 mcg daily to start on 3/28.  Medically stable for discharge.

## 2022-03-28 NOTE — PROGRESS NOTE ADULT - ASSESSMENT
61 year old man from [facility], active smoker, with a PMHx of CAD, CHF on nocturnal BIPAP, COPD ( MULTIPLE intubations in past), peripheral neuropathy, GERD, HTN, HLD, obesity, polyarthritis, Pulmonary HTN, TIA, Vit D Def, and recent admission to [facility] for respiratory failure requiring intubation (3/9 - 15 and again on 3/18 - 21), who re-presented to [facility] on 3/24 for acute on chronic respiratory failure with hypoxia and hypercapnia. Has Trilogy and oxygen at [facility]. In the ED he was obtunded, with CO2 greater than 125. Tried on BiPAP without improvement, and subsequently intubated and transferred to ICU. In ICU he was re-started on COPD medications, quickly weaned off ventilator and extubated. Consults placed with PT and social work. Collateral information obtained from assisted living, current episode likely triggered by smoking and/or non-adherence to medications. Tejada removed 3/25. Bladder scan at 10pm shows 450cc retention. Tejada cath reinserted.  3/25 Transferred from ICU to SCU.   3/27- Failed TOV on 3/26, starting Tamsulosin today and may reattempt TOV at a later date while inhouse.  Daliresp ordered to begin on 3/28 in hope of better COPD control in this pt w/ multiple readmissions and intubations.

## 2022-03-28 NOTE — PROGRESS NOTE ADULT - PROBLEM SELECTOR PLAN 1
Readmitted for hypercapnic respiratory failure. PCO2 greater than 120. Started on BIPAP therapy without improvement. Patient intubated in ED. Extubated 3/25.  Start AVAPS at night and as needed during the day. Oxygen 2LPM when not on AVAPS.  Has Trilogy and oxygen at [facility]. Will have [facility] check equipment at [facility].  End stage COPD. GOLD 4D  Continue bronchodilators and ICS  Solumedrol every 12 hours to complete today.  Daliresp starting on 3/28. Will need Daliresp 250mg daily for 30 days and then 500mg daily. Would benefit from outpatient PFTs and Pulmonary.

## 2022-03-28 NOTE — PROGRESS NOTE ADULT - SUBJECTIVE AND OBJECTIVE BOX
Patient is a 61y old  Male who presents with a chief complaint of acute hypoxic hypercapnic respiratory failure (28 Mar 2022 08:37)    PATIENT IS SEEN AND EXAMINED IN MEDICAL FLOOR.  EMIT [    ]    MT [   ]      GT [   ]    ALLERGIES:  No Known Allergies      Daily     Daily     VITALS:    Vital Signs Last 24 Hrs  T(C): 36.2 (28 Mar 2022 05:00), Max: 36.6 (27 Mar 2022 12:23)  T(F): 97.1 (28 Mar 2022 05:00), Max: 97.8 (27 Mar 2022 12:23)  HR: 68 (28 Mar 2022 05:00) (68 - 82)  BP: 106/55 (28 Mar 2022 05:00) (106/55 - 121/52)  BP(mean): --  RR: 14 (28 Mar 2022 05:00) (14 - 20)  SpO2: 96% (28 Mar 2022 05:00) (94% - 99%)    LABS:    CBC Full  -  ( 28 Mar 2022 08:53 )  WBC Count : 8.00 K/uL  RBC Count : 4.13 M/uL  Hemoglobin : 11.7 g/dL  Hematocrit : 36.9 %  Platelet Count - Automated : 117 K/uL  Mean Cell Volume : 89.3 fl  Mean Cell Hemoglobin : 28.3 pg  Mean Cell Hemoglobin Concentration : 31.7 gm/dL  Auto Neutrophil # : 6.82 K/uL  Auto Lymphocyte # : 0.60 K/uL  Auto Monocyte # : 0.50 K/uL  Auto Eosinophil # : 0.00 K/uL  Auto Basophil # : 0.00 K/uL  Auto Neutrophil % : 85.2 %  Auto Lymphocyte % : 7.5 %  Auto Monocyte % : 6.3 %  Auto Eosinophil % : 0.0 %  Auto Basophil % : 0.0 %      03-28    134<L>  |  93<L>  |  19<H>  ----------------------------<  295<H>  5.0   |  38<H>  |  x     Ca    8.9      28 Mar 2022 08:54  Phos  3.8     03-27  Mg     1.9     03-27    TPro  x   /  Alb  2.7<L>  /  TBili  x   /  DBili  x   /  AST  x   /  ALT  x   /  AlkPhos  x   03-28    CAPILLARY BLOOD GLUCOSE      POCT Blood Glucose.: 265 mg/dL (28 Mar 2022 07:52)  POCT Blood Glucose.: 384 mg/dL (27 Mar 2022 21:32)  POCT Blood Glucose.: 366 mg/dL (27 Mar 2022 17:02)  POCT Blood Glucose.: 341 mg/dL (27 Mar 2022 15:04)  POCT Blood Glucose.: 412 mg/dL (27 Mar 2022 11:42)        LIVER FUNCTIONS - ( 28 Mar 2022 08:54 )  Alb: 2.7 g/dL / Pro: x     / ALK PHOS: x     / ALT: x     / AST: x     / GGT: x           Creatinine Trend: 0.57<--, 0.59<--, 0.71<--, 0.71<--, 0.73<--, 0.67<--  I&O's Summary    27 Mar 2022 07:01  -  28 Mar 2022 07:00  --------------------------------------------------------  IN: 0 mL / OUT: 2002 mL / NET: -2002 mL            Clean Catch Clean Catch (Midstream)  03-25 @ 10:24   >100,000 CFU/ml Enterococcus faecalis  --  Enterococcus faecalis      .Blood Blood-Peripheral  03-25 @ 03:30   No growth to date.  --  --          MEDICATIONS:    MEDICATIONS  (STANDING):  ALBUTerol    90 MICROgram(s) HFA Inhaler 2 Puff(s) Inhalation every 6 hours  ALPRAZolam 0.5 milliGRAM(s) Oral every 12 hours  aspirin  chewable 81 milliGRAM(s) Oral daily  atorvastatin 40 milliGRAM(s) Oral at bedtime  budesonide 160 MICROgram(s)/formoterol 4.5 MICROgram(s) Inhaler 2 Puff(s) Inhalation two times a day  enoxaparin Injectable 40 milliGRAM(s) SubCutaneous every 24 hours  finasteride 5 milliGRAM(s) Oral daily  gabapentin 100 milliGRAM(s) Oral three times a day  glucagon  Injectable 1 milliGRAM(s) IntraMuscular once  insulin glargine Injectable (LANTUS) 10 Unit(s) SubCutaneous every morning  insulin lispro (ADMELOG) corrective regimen sliding scale   SubCutaneous Before meals and at bedtime  melatonin 5 milliGRAM(s) Oral at bedtime  nicotine - 21 mG/24Hr(s) Patch 1 patch Transdermal daily  pantoprazole  Injectable 40 milliGRAM(s) IV Push at bedtime  polyethylene glycol 3350 17 Gram(s) Oral daily  roflumilast 250 MICROGram(s) Oral daily  senna 2 Tablet(s) Oral at bedtime  tamsulosin 0.4 milliGRAM(s) Oral two times a day  tiotropium 18 MICROgram(s) Capsule 1 Capsule(s) Inhalation daily      MEDICATIONS  (PRN):  bisacodyl Suppository 10 milliGRAM(s) Rectal daily PRN Constipation  oxycodone    5 mG/acetaminophen 325 mG 1 Tablet(s) Oral every 6 hours PRN Severe Pain (7 - 10)      REVIEW OF SYSTEMS:                           ALL ROS DONE [ X   ]    CONSTITUTIONAL:  LETHARGIC [   ], FEVER [   ], UNRESPONSIVE [   ]  CVS:  CP  [   ], SOB, [   ], PALPITATIONS [   ], DIZZYNESS [   ]  RS: COUGH [   ], SPUTUM [   ]  GI: ABDOMINAL PAIN [   ], NAUSEA [   ], VOMITINGS [   ], DIARRHEA [   ], CONSTIPATION [   ]  :  DYSURIA [   ], NOCTURIA [   ], INCREASED FREQUENCY [   ], DRIBLING [   ],  SKELETAL: PAINFUL JOINTS [   ], SWOLLEN JOINTS [   ], NECK ACHE [   ], LOW BACK ACHE [   ],  SKIN : ULCERS [   ], RASH [   ], ITCHING [   ]  CNS: HEAD ACHE [   ], DOUBLE VISION [   ], BLURRED VISION [   ], AMS / CONFUSION [   ], SEIZURES [   ], WEAKNESS [   ],TINGLING / NUMBNESS [   ]    PHYSICAL EXAMINATION:    GENERAL APPEARANCE: NO DISTRESS  HEENT:  NO PALLOR, NO  JVD,  NO   NODES, NECK SUPPLE  CVS: S1 +, S2 +,   RS: AEEB,  OCCASIONAL  RALES +,  RONCHI +  ABD: SOFT, NT, NO, BS +  EXT: NO PE  SKIN: WARM,   SKELETAL:  ROM ACCEPTABLE  CNS:  AAO X 3        RADIOLOGY :    ACC: [id number] EXAM:  XR CHEST PORTABLE IMMED 1V                          PROCEDURE DATE:  03/25/2022          INTERPRETATION:  AP chest on March 25, 2022 at 2:02 AM. Patient was   intubated.    Heart magnified by technique.    Right lateral chest is excluded from the study.    Minimal infiltrate at right base somewhat increased from March 24.    At endotracheal tube and nasogastric tube in mid inserted.    IMPRESSION: Minimal right base infiltrate. Tubes inserted.      ASSESSMENT :       PLAN:  HPI:  61 year old man from [facility], active smoker, with a PMHx of CAD, CHF on nocturnal BIPAP, COPD (history of multiple intubation in past), peripheral neuropathy, GERD, HTN, HLD, obesity, polyarthritis, Pulmonary HTN, TIA, Vit D Def, and recent readmissionX2 to [facility] for respiratory failure requiring intubation ( 3/9 - 3/15, and ,most recent: 3/18-3/21), re-presented for acute on chronic respiratory failure with hypoxia and hypercapnia. History was limited to NH chart note review , coming in for weakness/AMS as per EMS. pt was not providing any further history. patient was placed on Bi-PAP in ED for ~2hours for acute on chronic hypoxic [id number] respiratory failure , with no improvement in mental status and blood gas , Patient intubated in ED on 3/25/2022 @~1am , right lip 23cm. pt received Bolus NS X2 for soft blood pressure s/p intubation. ICU consulted for close monitoring.      Of note : patient is known for being non adherent with his BiPAP      #  DC PLAN IN AM TO [facility] SINCE HE REQUIRES HIGHER LEVEL OF CARE WHICH [facility] IS UNABLE TO PROVIDE  # DC WITH TAPERING DOSES OF PREDNISONE     # COPD EXACERBATION, ACUTE ON CHRONIC HYPOXIC & HYPERCAPNEIC RESPIRATORY FAILURE S/T NONCOMPLIANCE W/ TOBACCO CESSATION AND W/ BIPAP   - S/P MECHANICAL VENTILATION  - STARTED ON IV STEROIDS [TAPERING], SYMBICORT, SPIRIVA, AZITHROMYCIN, NEBS,  O2 SUPPLEMENTS  - NICOTINE PATCH  - CRITICAL CARE EVALUATION IN PROGRESS    - COUNSELLED PATIENT AT LENGTH, DISCUSSED REGARDING NONCOMPLIANCE W/ SMOKING CESSATION. PATIENT IS HIGH RISK FOR RE-INTUBATION. D/W PATIENT THAT HE IS HIGH RISK FOR RESPIRATORY FAILURE. HE VERBALIZED UNDERSTANDING. HE EXPRESSED THAT HE WOULD ATTEMPT TO ABSTAIN FROM SMOKING AND UTILIZE NICOTINE PATCHES AND WOULD TRY TO REMAIN COMPLIANT WITH NOCTURNAL BIPAP    # CONSTIPATION , URINARY RETENTION - WILL DC AMOR, ADD MIRALAX AND SENNA, TAP WATER ENEMA X 1 TIME       # UNCONTROLLED DM, GLUCOSE INTOLERANCE DUE TO STEROIDS  -  LANTUS, SSI + FS     # SMOKER, COUNSELLED TO QUIT SMOKING     # GI AND DVT PROPHYLAXIS   Patient is a 61y old  Male who presents with a chief complaint of acute hypoxic hypercapnic respiratory failure (28 Mar 2022 08:37)    PATIENT IS SEEN AND EXAMINED IN MEDICAL FLOOR.      ALLERGIES:  No Known Allergies      VITALS:    Vital Signs Last 24 Hrs  T(C): 36.2 (28 Mar 2022 05:00), Max: 36.6 (27 Mar 2022 12:23)  T(F): 97.1 (28 Mar 2022 05:00), Max: 97.8 (27 Mar 2022 12:23)  HR: 68 (28 Mar 2022 05:00) (68 - 82)  BP: 106/55 (28 Mar 2022 05:00) (106/55 - 121/52)  BP(mean): --  RR: 14 (28 Mar 2022 05:00) (14 - 20)  SpO2: 96% (28 Mar 2022 05:00) (94% - 99%)    LABS:    CBC Full  -  ( 28 Mar 2022 08:53 )  WBC Count : 8.00 K/uL  RBC Count : 4.13 M/uL  Hemoglobin : 11.7 g/dL  Hematocrit : 36.9 %  Platelet Count - Automated : 117 K/uL  Mean Cell Volume : 89.3 fl  Mean Cell Hemoglobin : 28.3 pg  Mean Cell Hemoglobin Concentration : 31.7 gm/dL  Auto Neutrophil # : 6.82 K/uL  Auto Lymphocyte # : 0.60 K/uL  Auto Monocyte # : 0.50 K/uL  Auto Eosinophil # : 0.00 K/uL  Auto Basophil # : 0.00 K/uL  Auto Neutrophil % : 85.2 %  Auto Lymphocyte % : 7.5 %  Auto Monocyte % : 6.3 %  Auto Eosinophil % : 0.0 %  Auto Basophil % : 0.0 %      03-28    134<L>  |  93<L>  |  19<H>  ----------------------------<  295<H>  5.0   |  38<H>  |  x     Ca    8.9      28 Mar 2022 08:54  Phos  3.8     03-27  Mg     1.9     03-27    TPro  x   /  Alb  2.7<L>  /  TBili  x   /  DBili  x   /  AST  x   /  ALT  x   /  AlkPhos  x   03-28    CAPILLARY BLOOD GLUCOSE      POCT Blood Glucose.: 265 mg/dL (28 Mar 2022 07:52)  POCT Blood Glucose.: 384 mg/dL (27 Mar 2022 21:32)  POCT Blood Glucose.: 366 mg/dL (27 Mar 2022 17:02)  POCT Blood Glucose.: 341 mg/dL (27 Mar 2022 15:04)  POCT Blood Glucose.: 412 mg/dL (27 Mar 2022 11:42)        LIVER FUNCTIONS - ( 28 Mar 2022 08:54 )  Alb: 2.7 g/dL / Pro: x     / ALK PHOS: x     / ALT: x     / AST: x     / GGT: x           Creatinine Trend: 0.57<--, 0.59<--, 0.71<--, 0.71<--, 0.73<--, 0.67<--  I&O's Summary    27 Mar 2022 07:01  -  28 Mar 2022 07:00  --------------------------------------------------------  IN: 0 mL / OUT: 2002 mL / NET: -2002 mL            Clean Catch Clean Catch (Midstream)  03-25 @ 10:24   >100,000 CFU/ml Enterococcus faecalis  --  Enterococcus faecalis      .Blood Blood-Peripheral  03-25 @ 03:30   No growth to date.  --  --          MEDICATIONS:    MEDICATIONS  (STANDING):  ALBUTerol    90 MICROgram(s) HFA Inhaler 2 Puff(s) Inhalation every 6 hours  ALPRAZolam 0.5 milliGRAM(s) Oral every 12 hours  aspirin  chewable 81 milliGRAM(s) Oral daily  atorvastatin 40 milliGRAM(s) Oral at bedtime  budesonide 160 MICROgram(s)/formoterol 4.5 MICROgram(s) Inhaler 2 Puff(s) Inhalation two times a day  enoxaparin Injectable 40 milliGRAM(s) SubCutaneous every 24 hours  finasteride 5 milliGRAM(s) Oral daily  gabapentin 100 milliGRAM(s) Oral three times a day  glucagon  Injectable 1 milliGRAM(s) IntraMuscular once  insulin glargine Injectable (LANTUS) 10 Unit(s) SubCutaneous every morning  insulin lispro (ADMELOG) corrective regimen sliding scale   SubCutaneous Before meals and at bedtime  melatonin 5 milliGRAM(s) Oral at bedtime  nicotine - 21 mG/24Hr(s) Patch 1 patch Transdermal daily  pantoprazole  Injectable 40 milliGRAM(s) IV Push at bedtime  polyethylene glycol 3350 17 Gram(s) Oral daily  roflumilast 250 MICROGram(s) Oral daily  senna 2 Tablet(s) Oral at bedtime  tamsulosin 0.4 milliGRAM(s) Oral two times a day  tiotropium 18 MICROgram(s) Capsule 1 Capsule(s) Inhalation daily      MEDICATIONS  (PRN):  bisacodyl Suppository 10 milliGRAM(s) Rectal daily PRN Constipation  oxycodone    5 mG/acetaminophen 325 mG 1 Tablet(s) Oral every 6 hours PRN Severe Pain (7 - 10)      REVIEW OF SYSTEMS:                           ALL ROS DONE [ X   ]    CONSTITUTIONAL:  LETHARGIC [   ], FEVER [   ], UNRESPONSIVE [   ]  CVS:  CP  [   ], SOB, [   ], PALPITATIONS [   ], DIZZYNESS [   ]  RS: COUGH [   ], SPUTUM [   ]  GI: ABDOMINAL PAIN [   ], NAUSEA [   ], VOMITINGS [   ], DIARRHEA [   ], CONSTIPATION [   ]  :  DYSURIA [   ], NOCTURIA [   ], INCREASED FREQUENCY [   ], DRIBLING [   ],  SKELETAL: PAINFUL JOINTS [   ], SWOLLEN JOINTS [   ], NECK ACHE [   ], LOW BACK ACHE [   ],  SKIN : ULCERS [   ], RASH [   ], ITCHING [   ]  CNS: HEAD ACHE [   ], DOUBLE VISION [   ], BLURRED VISION [   ], AMS / CONFUSION [   ], SEIZURES [   ], WEAKNESS [   ],TINGLING / NUMBNESS [   ]    PHYSICAL EXAMINATION:    GENERAL APPEARANCE: NO DISTRESS  HEENT:  NO PALLOR, NO  JVD,  NO   NODES, NECK SUPPLE  CVS: S1 +, S2 +,   RS: AEEB,  OCCASIONAL  RALES +,  RONCHI +  ABD: SOFT, NT, NO, BS +  EXT: NO PE  SKIN: WARM,   SKELETAL:  ROM ACCEPTABLE  CNS:  AAO X 3        RADIOLOGY :    ACC: [id number] EXAM:  XR CHEST PORTABLE IMMED 1V                          PROCEDURE DATE:  03/25/2022          INTERPRETATION:  AP chest on March 25, 2022 at 2:02 AM. Patient was   intubated.    Heart magnified by technique.    Right lateral chest is excluded from the study.    Minimal infiltrate at right base somewhat increased from March 24.    At endotracheal tube and nasogastric tube in mid inserted.    IMPRESSION: Minimal right base infiltrate. Tubes inserted.      ASSESSMENT :       PLAN:  HPI:  61 year old man from [facility], active smoker, with a PMHx of CAD, CHF on nocturnal BIPAP, COPD (history of multiple intubation in past), peripheral neuropathy, GERD, HTN, HLD, obesity, polyarthritis, Pulmonary HTN, TIA, Vit D Def, and recent readmissionX2 to [facility] for respiratory failure requiring intubation ( 3/9 - 3/15, and ,most recent: 3/18-3/21), re-presented for acute on chronic respiratory failure with hypoxia and hypercapnia. History was limited to NH chart note review , coming in for weakness/AMS as per EMS. pt was not providing any further history. patient was placed on Bi-PAP in ED for ~2hours for acute on chronic hypoxic [id number] respiratory failure , with no improvement in mental status and blood gas , Patient intubated in ED on 3/25/2022 @~1am , right lip 23cm. pt received Bolus NS X2 for soft blood pressure s/p intubation. ICU consulted for close monitoring.      Of note : patient is known for being non adherent with his BiPAP      #  DC PLAN IN AM TO [facility] SINCE HE REQUIRES HIGHER LEVEL OF CARE WHICH [facility] IS UNABLE TO PROVIDE; EXTENSIVE DISCUSSION WITH PATIENT AND [facility]  REGARDING PATIENT'S CARE NEEDS. PATIENT EXPRESSED THAT HE ONLY WANTS TO LIVE IN A BUILDING WHERE HE WILL BE ALLOWED TO INTERMITTENTLY SMOKE CIGARETTES. HE EXPRESSED UNDERSTANDING THAT SMOKING COULD RESULT IN WORSENING RESPIRATORY DISEASE, MALIGNANCY AND EVEN DEATH. HE STATES HIS GOC IS FULL CODE AND UNDERSTANDS THE RISKS OF REQUIRING TRACHEOSTOMY IN THE FUTURE. CM TEAM WILL COORDINATE WITH PATIENT REGARDING LONG TERM PLACEMENT.  # DC WITH TAPERING DOSES OF PREDNISONE     # COPD EXACERBATION, ACUTE ON CHRONIC HYPOXIC & HYPERCAPNEIC RESPIRATORY FAILURE S/T NONCOMPLIANCE W/ TOBACCO CESSATION AND W/ BIPAP   - S/P MECHANICAL VENTILATION  - STARTED ON IV STEROIDS [TAPERING], SYMBICORT, SPIRIVA, AZITHROMYCIN, NEBS,  O2 SUPPLEMENTS  - NICOTINE PATCH  - CRITICAL CARE EVALUATION IN PROGRESS    - COUNSELLED PATIENT AT LENGTH, DISCUSSED REGARDING NONCOMPLIANCE W/ SMOKING CESSATION. PATIENT IS HIGH RISK FOR RE-INTUBATION. D/W PATIENT THAT HE IS HIGH RISK FOR RESPIRATORY FAILURE. HE VERBALIZED UNDERSTANDING. HE EXPRESSED THAT HE WOULD ATTEMPT TO ABSTAIN FROM SMOKING AND UTILIZE NICOTINE PATCHES AND WOULD TRY TO REMAIN COMPLIANT WITH NOCTURNAL BIPAP    # CONSTIPATION , URINARY RETENTION - WILL DC AMOR, ADD MIRALAX AND SENNA, TAP WATER ENEMA X 1 TIME       # UNCONTROLLED DM, GLUCOSE INTOLERANCE DUE TO STEROIDS  -  LANTUS, SSI + FS     # SMOKER, COUNSELLED TO QUIT SMOKING     # GI AND DVT PROPHYLAXIS

## 2022-03-29 LAB
ALBUMIN SERPL ELPH-MCNC: 2.8 G/DL — LOW (ref 3.5–5)
ALP SERPL-CCNC: 44 U/L — SIGNIFICANT CHANGE UP (ref 40–120)
ALT FLD-CCNC: 35 U/L DA — SIGNIFICANT CHANGE UP (ref 10–60)
ANION GAP SERPL CALC-SCNC: 3 MMOL/L — LOW (ref 5–17)
AST SERPL-CCNC: 10 U/L — SIGNIFICANT CHANGE UP (ref 10–40)
BILIRUB SERPL-MCNC: 0.4 MG/DL — SIGNIFICANT CHANGE UP (ref 0.2–1.2)
BUN SERPL-MCNC: 15 MG/DL — SIGNIFICANT CHANGE UP (ref 7–18)
CALCIUM SERPL-MCNC: 8.5 MG/DL — SIGNIFICANT CHANGE UP (ref 8.4–10.5)
CHLORIDE SERPL-SCNC: 91 MMOL/L — LOW (ref 96–108)
CO2 SERPL-SCNC: 40 MMOL/L — HIGH (ref 22–31)
CREAT SERPL-MCNC: 0.62 MG/DL — SIGNIFICANT CHANGE UP (ref 0.5–1.3)
EGFR: 110 ML/MIN/1.73M2 — SIGNIFICANT CHANGE UP
GLUCOSE SERPL-MCNC: 280 MG/DL — HIGH (ref 70–99)
POTASSIUM SERPL-MCNC: 4.4 MMOL/L — SIGNIFICANT CHANGE UP (ref 3.5–5.3)
POTASSIUM SERPL-SCNC: 4.4 MMOL/L — SIGNIFICANT CHANGE UP (ref 3.5–5.3)
PROT SERPL-MCNC: 5.5 G/DL — LOW (ref 6–8.3)
SODIUM SERPL-SCNC: 134 MMOL/L — LOW (ref 135–145)

## 2022-03-29 RX ADMIN — BUDESONIDE AND FORMOTEROL FUMARATE DIHYDRATE 2 PUFF(S): 160; 4.5 AEROSOL RESPIRATORY (INHALATION) at 22:45

## 2022-03-29 RX ADMIN — OXYCODONE AND ACETAMINOPHEN 1 TABLET(S): 5; 325 TABLET ORAL at 12:57

## 2022-03-29 RX ADMIN — Medication 1 PATCH: at 12:18

## 2022-03-29 RX ADMIN — Medication 81 MILLIGRAM(S): at 12:19

## 2022-03-29 RX ADMIN — Medication 5: at 22:37

## 2022-03-29 RX ADMIN — ROFLUMILAST 250 MICROGRAM(S): 500 TABLET ORAL at 12:29

## 2022-03-29 RX ADMIN — OXYCODONE AND ACETAMINOPHEN 1 TABLET(S): 5; 325 TABLET ORAL at 12:23

## 2022-03-29 RX ADMIN — BUDESONIDE AND FORMOTEROL FUMARATE DIHYDRATE 2 PUFF(S): 160; 4.5 AEROSOL RESPIRATORY (INHALATION) at 09:28

## 2022-03-29 RX ADMIN — Medication 5 MILLIGRAM(S): at 22:45

## 2022-03-29 RX ADMIN — Medication 2: at 08:18

## 2022-03-29 RX ADMIN — TAMSULOSIN HYDROCHLORIDE 0.4 MILLIGRAM(S): 0.4 CAPSULE ORAL at 17:48

## 2022-03-29 RX ADMIN — Medication 3: at 12:17

## 2022-03-29 RX ADMIN — ATORVASTATIN CALCIUM 40 MILLIGRAM(S): 80 TABLET, FILM COATED ORAL at 22:40

## 2022-03-29 RX ADMIN — Medication 5: at 17:42

## 2022-03-29 RX ADMIN — Medication 4 UNIT(S): at 17:42

## 2022-03-29 RX ADMIN — OXYCODONE AND ACETAMINOPHEN 1 TABLET(S): 5; 325 TABLET ORAL at 18:55

## 2022-03-29 RX ADMIN — GABAPENTIN 100 MILLIGRAM(S): 400 CAPSULE ORAL at 15:59

## 2022-03-29 RX ADMIN — TAMSULOSIN HYDROCHLORIDE 0.4 MILLIGRAM(S): 0.4 CAPSULE ORAL at 05:26

## 2022-03-29 RX ADMIN — Medication 4 UNIT(S): at 08:19

## 2022-03-29 RX ADMIN — INSULIN GLARGINE 8 UNIT(S): 100 INJECTION, SOLUTION SUBCUTANEOUS at 22:35

## 2022-03-29 RX ADMIN — GABAPENTIN 100 MILLIGRAM(S): 400 CAPSULE ORAL at 05:27

## 2022-03-29 RX ADMIN — OXYCODONE AND ACETAMINOPHEN 1 TABLET(S): 5; 325 TABLET ORAL at 05:27

## 2022-03-29 RX ADMIN — ENOXAPARIN SODIUM 40 MILLIGRAM(S): 100 INJECTION SUBCUTANEOUS at 05:28

## 2022-03-29 RX ADMIN — Medication 0.5 MILLIGRAM(S): at 05:27

## 2022-03-29 RX ADMIN — PANTOPRAZOLE SODIUM 40 MILLIGRAM(S): 20 TABLET, DELAYED RELEASE ORAL at 22:50

## 2022-03-29 RX ADMIN — Medication 0.5 MILLIGRAM(S): at 17:48

## 2022-03-29 RX ADMIN — Medication 4 UNIT(S): at 12:18

## 2022-03-29 RX ADMIN — OXYCODONE AND ACETAMINOPHEN 1 TABLET(S): 5; 325 TABLET ORAL at 19:30

## 2022-03-29 RX ADMIN — OXYCODONE AND ACETAMINOPHEN 1 TABLET(S): 5; 325 TABLET ORAL at 06:32

## 2022-03-29 RX ADMIN — FINASTERIDE 5 MILLIGRAM(S): 5 TABLET, FILM COATED ORAL at 12:19

## 2022-03-29 RX ADMIN — GABAPENTIN 100 MILLIGRAM(S): 400 CAPSULE ORAL at 22:45

## 2022-03-29 NOTE — PROGRESS NOTE ADULT - PROBLEM SELECTOR PLAN 2
End stage GOLD 4D  Continue oxygen support. AVAPS nightly and as needed during the day.  Has Trilogy and oxygen at [facility]  Continue bronchodilators and ICS  Solumedrol 40mg every 12 hours.  Daliresp 250mcg daily to begin on 3/28 for 1 month and to continue Daliresp 500mg daily if tolerating well.

## 2022-03-29 NOTE — PROGRESS NOTE ADULT - ASSESSMENT
61 year old man from [facility], active smoker, with a PMHx of CAD, CHF on nocturnal BIPAP, COPD ( MULTIPLE intubations in past), peripheral neuropathy, GERD, HTN, HLD, obesity, polyarthritis, Pulmonary HTN, TIA, Vit D Def, and recent admission to [facility] for respiratory failure requiring intubation (3/9 - 15 and again on 3/18 - 21), who re-presented to [facility] on 3/24 for acute on chronic respiratory failure with hypoxia and hypercapnia. Has Trilogy and oxygen at [facility]. In the ED he was obtunded, with CO2 greater than 125. Tried on BiPAP without improvement, and subsequently intubated and transferred to ICU. In ICU he was re-started on COPD medications, quickly weaned off ventilator and extubated. Consults placed with PT and social work. Collateral information obtained from assisted living, current episode likely triggered by smoking and/or non-adherence to medications. Tejada removed 3/25. Bladder scan at 10pm shows 450cc retention. Tejada cath reinserted.  3/25 Transferred from ICU to SCU.    Daliresp ordered to begin on 3/28 in hope of better COPD control in this pt w/ multiple readmissions and intubations.

## 2022-03-29 NOTE — PROGRESS NOTE ADULT - PROBLEM SELECTOR PLAN 10
DVT and GI prophylaxis.  AVAPS nightly and as needed during the day.  Continue oxygen support when not using Trilogy.  Noncompliant with Trilogy at [facility].  Needs to follow with Pulmonary as an outpatient.  High risk for reintubation and death secondary to noncompliance.  Daliresp 250 mcg daily to start on 3/28.  Medically stable for discharge.

## 2022-03-29 NOTE — PROGRESS NOTE ADULT - PROBLEM SELECTOR PLAN 1
Readmitted for hypercapnic respiratory failure. PCO2 greater than 120. Started on BIPAP therapy without improvement. Patient intubated in ED. Extubated 3/25.  Start AVAPS at night and as needed during the day. Oxygen 2LPM when not on AVAPS.  Has Trilogy and oxygen at [facility]. Will have [facility] check equipment at [facility].  End stage COPD. GOLD 4D  Continue bronchodilators and ICS  Solumedrol every 12 hours to complete today.  Daliresp starting on 3/28. Will need Daliresp 250mg daily for 30 days and then 500mg daily. Would benefit from outpatient PFTs and Pulmonary.

## 2022-03-29 NOTE — PROGRESS NOTE ADULT - SUBJECTIVE AND OBJECTIVE BOX
DEVIKA CARBALLO    SCU progress note    INTERVAL HPI/OVERNIGHT EVENTS: ***No overnight events    DNR [ ]   DNI  [  ]  FULL CODE    Covid - 19 PCR: Negative 3/28    The 4Ms    What Matters Most: see GOC  Age appropriate Medications/Screen for High Risk Medication: Yes  Mentation: see CAM below  Mobility: defer to physical exam    The Confusion Assessment Method (CAM) Diagnostic Algorithm     1: Acute Onset or Fluctuating Course  - Is there evidence of an acute change in mental status from the patient’s baseline? Did the (abnormal) behavior  fluctuate during the day, that is, tend to come and go, or increase and decrease in severity?  [ ] YES [x ] NO     2: Inattention  - Did the patient have difficulty focusing attention, being easily distractible, or having difficulty keeping track of what was being said?  [ ] YES [x ] NO     3: Disorganized thinking  -Was the patient’s thinking disorganized or incoherent, such as rambling or irrelevant conversation, unclear or illogical flow of ideas, or unpredictable switching from subject to subject?  [ ] YES [x ] NO    4: Altered Level of consciousness?  [ ] YES [x ] NO    The diagnosis of delirium by CAM requires the presence of features 1 and 2 and either 3 or 4.    PRESSORS: [ ] YES [x ] NO    Cardiovascular:  Heart Failure  Acute   Acute on Chronic  Chronic       tamsulosin 0.4 milliGRAM(s) Oral two times a day    Pulmonary:  ALBUTerol    90 MICROgram(s) HFA Inhaler 2 Puff(s) Inhalation every 6 hours  budesonide 160 MICROgram(s)/formoterol 4.5 MICROgram(s) Inhaler 2 Puff(s) Inhalation two times a day  roflumilast 250 MICROGram(s) Oral daily  tiotropium 18 MICROgram(s) Capsule 1 Capsule(s) Inhalation daily    Hematalogic:  aspirin  chewable 81 milliGRAM(s) Oral daily  enoxaparin Injectable 40 milliGRAM(s) SubCutaneous every 24 hours    Other:  ALPRAZolam 0.5 milliGRAM(s) Oral every 12 hours  atorvastatin 40 milliGRAM(s) Oral at bedtime  bisacodyl Suppository 10 milliGRAM(s) Rectal daily PRN  finasteride 5 milliGRAM(s) Oral daily  gabapentin 100 milliGRAM(s) Oral three times a day  glucagon  Injectable 1 milliGRAM(s) IntraMuscular once  insulin glargine Injectable (LANTUS) 8 Unit(s) SubCutaneous at bedtime  insulin lispro (ADMELOG) corrective regimen sliding scale   SubCutaneous Before meals and at bedtime  insulin lispro Injectable (ADMELOG) 4 Unit(s) SubCutaneous three times a day before meals  melatonin 5 milliGRAM(s) Oral at bedtime  nicotine - 21 mG/24Hr(s) Patch 1 patch Transdermal daily  oxycodone    5 mG/acetaminophen 325 mG 1 Tablet(s) Oral every 6 hours PRN  pantoprazole  Injectable 40 milliGRAM(s) IV Push at bedtime  polyethylene glycol 3350 17 Gram(s) Oral daily  senna 2 Tablet(s) Oral at bedtime    ALBUTerol    90 MICROgram(s) HFA Inhaler 2 Puff(s) Inhalation every 6 hours  ALPRAZolam 0.5 milliGRAM(s) Oral every 12 hours  aspirin  chewable 81 milliGRAM(s) Oral daily  atorvastatin 40 milliGRAM(s) Oral at bedtime  bisacodyl Suppository 10 milliGRAM(s) Rectal daily PRN  budesonide 160 MICROgram(s)/formoterol 4.5 MICROgram(s) Inhaler 2 Puff(s) Inhalation two times a day  enoxaparin Injectable 40 milliGRAM(s) SubCutaneous every 24 hours  finasteride 5 milliGRAM(s) Oral daily  gabapentin 100 milliGRAM(s) Oral three times a day  glucagon  Injectable 1 milliGRAM(s) IntraMuscular once  insulin glargine Injectable (LANTUS) 8 Unit(s) SubCutaneous at bedtime  insulin lispro (ADMELOG) corrective regimen sliding scale   SubCutaneous Before meals and at bedtime  insulin lispro Injectable (ADMELOG) 4 Unit(s) SubCutaneous three times a day before meals  melatonin 5 milliGRAM(s) Oral at bedtime  nicotine - 21 mG/24Hr(s) Patch 1 patch Transdermal daily  oxycodone    5 mG/acetaminophen 325 mG 1 Tablet(s) Oral every 6 hours PRN  pantoprazole  Injectable 40 milliGRAM(s) IV Push at bedtime  polyethylene glycol 3350 17 Gram(s) Oral daily  roflumilast 250 MICROGram(s) Oral daily  senna 2 Tablet(s) Oral at bedtime  tamsulosin 0.4 milliGRAM(s) Oral two times a day  tiotropium 18 MICROgram(s) Capsule 1 Capsule(s) Inhalation daily    Drug Dosing Weight  Height (cm): 172.7 (24 Mar 2022 22:42)  Weight (kg): 93.043 (26 Mar 2022 07:08)  BMI (kg/m2): 31.2 (26 Mar 2022 07:08)  BSA (m2): 2.07 (26 Mar 2022 07:08)    CENTRAL LINE: [ ] YES [x ] NO  LOCATION:   DATE INSERTED:  REMOVE: [ ] YES [ ] NO  EXPLAIN:    AMOR: [ ] YES [x ] NO    DATE INSERTED:  REMOVE:  [ ] YES [ ] NO  EXPLAIN:    PAST MEDICAL & SURGICAL HISTORY:  COPD (chronic obstructive pulmonary disease)  Oxygen 2-3L at home    Stented coronary artery  2017    HLD (hyperlipidemia)    Pulmonary HTN    Type 2 diabetes mellitus without complication, with long-term current use of insulin    Coronary artery disease involving native coronary artery of native heart without angina pectoris    Smoker    Gastroesophageal reflux disease, esophagitis presence not specified    Oxygen dependent    DM (diabetes mellitus)    CAD (coronary artery disease)    GERD (gastroesophageal reflux disease)    Insomnia    CHF (congestive heart failure)    HTN (hypertension)    Mood disorder    No significant past surgical history                03-28 @ 07:01  -  03-29 @ 07:00  --------------------------------------------------------  IN: 375 mL / OUT: 700 mL / NET: -325 mL            PHYSICAL EXAM:    GENERAL: NAD, morbidly obese  HEAD:  Atraumatic, Normocephalic  EYES: EOMI, PERRLA, conjunctiva and sclera clear  ENMT: No tonsillar erythema, exudates  NECK: Supple, No JVD  NERVOUS SYSTEM:  Alert & Oriented X3, Follows commands. Moving all extremities.  CHEST/LUNG: Diminished breath sound bilaterally  HEART: Regular rate and rhythm; No murmurs, rubs, or gallops  ABDOMEN: Soft, Obese, Nontender, Nondistended; Bowel sounds present  EXTREMITIES:  2+ Peripheral Pulses, No clubbing, cyanosis, or edema  LYMPH: No lymphadenopathy noted  SKIN: Stage 1 Pressure Injury to the Bilateral Ischium,      LABS:  CBC Full  -  ( 28 Mar 2022 08:53 )  WBC Count : 8.00 K/uL  RBC Count : 4.13 M/uL  Hemoglobin : 11.7 g/dL  Hematocrit : 36.9 %  Platelet Count - Automated : 117 K/uL  Mean Cell Volume : 89.3 fl  Mean Cell Hemoglobin : 28.3 pg  Mean Cell Hemoglobin Concentration : 31.7 gm/dL  Auto Neutrophil # : 6.82 K/uL  Auto Lymphocyte # : 0.60 K/uL  Auto Monocyte # : 0.50 K/uL  Auto Eosinophil # : 0.00 K/uL  Auto Basophil # : 0.00 K/uL  Auto Neutrophil % : 85.2 %  Auto Lymphocyte % : 7.5 %  Auto Monocyte % : 6.3 %  Auto Eosinophil % : 0.0 %  Auto Basophil % : 0.0 %    03-29    134<L>  |  91<L>  |  15  ----------------------------<  280<H>  4.4   |  40<H>  |  0.62    Ca    8.5      29 Mar 2022 07:40    TPro  5.5<L>  /  Alb  2.8<L>  /  TBili  0.4  /  DBili  x   /  AST  10  /  ALT  35  /  AlkPhos  44  03-29              [  ]  DVT Prophylaxis  [  ]  Nutrition, Brand, Rate         Goal Rate        Abnormal Nutritional Status -  Malnutrition   Cachexia      Morbid Obesity BMI >/=40    RADIOLOGY & ADDITIONAL STUDIES:  ***  < from: Xray Chest 1 View-PORTABLE IMMEDIATE (Xray Chest 1 View-PORTABLE IMMEDIATE .) (03.25.22 @ 02:55) >  Heart magnified by technique.    Right lateral chest is excluded from the study.    Minimal infiltrate at right base somewhat increased from March 24.    At endotracheal tube and nasogastric tube in mid inserted.    IMPRESSION: Minimal right base infiltrate. Tubes inserted.    < end of copied text >    Goals of Care Discussion with Family/Proxy/Other   - see note from 3/20 and 3/25

## 2022-03-29 NOTE — PROGRESS NOTE ADULT - SUBJECTIVE AND OBJECTIVE BOX
Patient is a 61y old  Male who presents with a chief complaint of acute hypoxic hypercapnic respiratory failure (28 Mar 2022 09:24)    PATIENT IS SEEN AND EXAMINED IN MEDICAL FLOOR.  JAMILA [    ]    MT [   ]      GT [   ]    ALLERGIES:  No Known Allergies      Daily     Daily     VITALS:    Vital Signs Last 24 Hrs  T(C): 36 (28 Mar 2022 21:14), Max: 36.9 (28 Mar 2022 12:31)  T(F): 96.8 (28 Mar 2022 21:14), Max: 98.4 (28 Mar 2022 12:31)  HR: 83 (29 Mar 2022 05:06) (73 - 84)  BP: 103/65 (29 Mar 2022 05:06) (103/65 - 126/64)  BP(mean): --  RR: 20 (29 Mar 2022 05:06) (20 - 20)  SpO2: 99% (29 Mar 2022 05:06) (97% - 100%)    LABS:    CBC Full  -  ( 28 Mar 2022 08:53 )  WBC Count : 8.00 K/uL  RBC Count : 4.13 M/uL  Hemoglobin : 11.7 g/dL  Hematocrit : 36.9 %  Platelet Count - Automated : 117 K/uL  Mean Cell Volume : 89.3 fl  Mean Cell Hemoglobin : 28.3 pg  Mean Cell Hemoglobin Concentration : 31.7 gm/dL  Auto Neutrophil # : 6.82 K/uL  Auto Lymphocyte # : 0.60 K/uL  Auto Monocyte # : 0.50 K/uL  Auto Eosinophil # : 0.00 K/uL  Auto Basophil # : 0.00 K/uL  Auto Neutrophil % : 85.2 %  Auto Lymphocyte % : 7.5 %  Auto Monocyte % : 6.3 %  Auto Eosinophil % : 0.0 %  Auto Basophil % : 0.0 %      03-29    134<L>  |  91<L>  |  15  ----------------------------<  280<H>  4.4   |  40<H>  |  0.62    Ca    8.5      29 Mar 2022 07:40    TPro  5.5<L>  /  Alb  2.8<L>  /  TBili  0.4  /  DBili  x   /  AST  10  /  ALT  35  /  AlkPhos  44  03-29    CAPILLARY BLOOD GLUCOSE      POCT Blood Glucose.: 248 mg/dL (29 Mar 2022 07:32)  POCT Blood Glucose.: 249 mg/dL (28 Mar 2022 22:32)  POCT Blood Glucose.: 473 mg/dL (28 Mar 2022 16:46)  POCT Blood Glucose.: 471 mg/dL (28 Mar 2022 16:44)  POCT Blood Glucose.: 472 mg/dL (28 Mar 2022 11:53)        LIVER FUNCTIONS - ( 29 Mar 2022 07:40 )  Alb: 2.8 g/dL / Pro: 5.5 g/dL / ALK PHOS: 44 U/L / ALT: 35 U/L DA / AST: 10 U/L / GGT: x           Creatinine Trend: 0.62<--, 0.65<--, 0.57<--, 0.59<--, 0.71<--, 0.71<--  I&O's Summary    28 Mar 2022 07:01  -  29 Mar 2022 07:00  --------------------------------------------------------  IN: 375 mL / OUT: 700 mL / NET: -325 mL            Clean Catch Clean Catch (Midstream)  03-25 @ 10:24   >100,000 CFU/ml Enterococcus faecalis  --  Enterococcus faecalis      .Blood Blood-Peripheral  03-25 @ 03:30   No growth to date.  --  --          MEDICATIONS:    MEDICATIONS  (STANDING):  ALBUTerol    90 MICROgram(s) HFA Inhaler 2 Puff(s) Inhalation every 6 hours  ALPRAZolam 0.5 milliGRAM(s) Oral every 12 hours  aspirin  chewable 81 milliGRAM(s) Oral daily  atorvastatin 40 milliGRAM(s) Oral at bedtime  budesonide 160 MICROgram(s)/formoterol 4.5 MICROgram(s) Inhaler 2 Puff(s) Inhalation two times a day  enoxaparin Injectable 40 milliGRAM(s) SubCutaneous every 24 hours  finasteride 5 milliGRAM(s) Oral daily  gabapentin 100 milliGRAM(s) Oral three times a day  glucagon  Injectable 1 milliGRAM(s) IntraMuscular once  insulin glargine Injectable (LANTUS) 8 Unit(s) SubCutaneous at bedtime  insulin lispro (ADMELOG) corrective regimen sliding scale   SubCutaneous Before meals and at bedtime  insulin lispro Injectable (ADMELOG) 4 Unit(s) SubCutaneous three times a day before meals  melatonin 5 milliGRAM(s) Oral at bedtime  nicotine - 21 mG/24Hr(s) Patch 1 patch Transdermal daily  pantoprazole  Injectable 40 milliGRAM(s) IV Push at bedtime  polyethylene glycol 3350 17 Gram(s) Oral daily  roflumilast 250 MICROGram(s) Oral daily  senna 2 Tablet(s) Oral at bedtime  tamsulosin 0.4 milliGRAM(s) Oral two times a day  tiotropium 18 MICROgram(s) Capsule 1 Capsule(s) Inhalation daily      MEDICATIONS  (PRN):  bisacodyl Suppository 10 milliGRAM(s) Rectal daily PRN Constipation  oxycodone    5 mG/acetaminophen 325 mG 1 Tablet(s) Oral every 6 hours PRN Severe Pain (7 - 10)      REVIEW OF SYSTEMS:                           ALL ROS DONE [ X   ]    CONSTITUTIONAL:  LETHARGIC [   ], FEVER [   ], UNRESPONSIVE [   ]  CVS:  CP  [   ], SOB, [   ], PALPITATIONS [   ], DIZZYNESS [   ]  RS: COUGH [   ], SPUTUM [   ]  GI: ABDOMINAL PAIN [   ], NAUSEA [   ], VOMITINGS [   ], DIARRHEA [   ], CONSTIPATION [   ]  :  DYSURIA [   ], NOCTURIA [   ], INCREASED FREQUENCY [   ], DRIBLING [   ],  SKELETAL: PAINFUL JOINTS [   ], SWOLLEN JOINTS [   ], NECK ACHE [   ], LOW BACK ACHE [   ],  SKIN : ULCERS [   ], RASH [   ], ITCHING [   ]  CNS: HEAD ACHE [   ], DOUBLE VISION [   ], BLURRED VISION [   ], AMS / CONFUSION [   ], SEIZURES [   ], WEAKNESS [   ],TINGLING / NUMBNESS [   ]      PHYSICAL EXAMINATION:    GENERAL APPEARANCE: NO DISTRESS  HEENT:  NO PALLOR, NO  JVD,  NO   NODES, NECK SUPPLE  CVS: S1 +, S2 +,   RS: AEEB,  OCCASIONAL  RALES +,  RONCHI +  ABD: SOFT, NT, NO, BS +  EXT: NO PE  SKIN: WARM,   SKELETAL:  ROM ACCEPTABLE  CNS:  AAO X 3        RADIOLOGY :    ACC: [id number] EXAM:  XR CHEST PORTABLE IMMED 1V                          PROCEDURE DATE:  03/25/2022          INTERPRETATION:  AP chest on March 25, 2022 at 2:02 AM. Patient was   intubated.    Heart magnified by technique.    Right lateral chest is excluded from the study.    Minimal infiltrate at right base somewhat increased from March 24.    At endotracheal tube and nasogastric tube in mid inserted.    IMPRESSION: Minimal right base infiltrate. Tubes inserted.      ASSESSMENT :       PLAN:  HPI:  61 year old man from [facility], active smoker, with a PMHx of CAD, CHF on nocturnal BIPAP, COPD (history of multiple intubation in past), peripheral neuropathy, GERD, HTN, HLD, obesity, polyarthritis, Pulmonary HTN, TIA, Vit D Def, and recent readmissionX2 to [facility] for respiratory failure requiring intubation ( 3/9 - 3/15, and ,most recent: 3/18-3/21), re-presented for acute on chronic respiratory failure with hypoxia and hypercapnia. History was limited to NH chart note review , coming in for weakness/AMS as per EMS. pt was not providing any further history. patient was placed on Bi-PAP in ED for ~2hours for acute on chronic hypoxic [id number] respiratory failure , with no improvement in mental status and blood gas , Patient intubated in ED on 3/25/2022 @~1am , right lip 23cm. pt received Bolus NS X2 for soft blood pressure s/p intubation. ICU consulted for close monitoring.      Of note : patient is known for being non adherent with his BiPAP      #  DC PLAN IN AM TO [facility] SINCE HE REQUIRES HIGHER LEVEL OF CARE WHICH [facility] ASSISTED LIVING IS UNABLE TO PROVIDE; EXTENSIVE DISCUSSION WITH PATIENT AND [facility]  REGARDING PATIENT'S CARE NEEDS. PATIENT EXPRESSED THAT HE ONLY WANTS TO LIVE IN A BUILDING WHERE HE WILL BE ALLOWED TO INTERMITTENTLY SMOKE CIGARETTES. HE EXPRESSED UNDERSTANDING THAT SMOKING COULD RESULT IN WORSENING RESPIRATORY DISEASE, MALIGNANCY AND EVEN DEATH. HE STATES HIS GOC IS FULL CODE AND UNDERSTANDS THE RISKS OF REQUIRING TRACHEOSTOMY IN THE FUTURE. CM TEAM WILL COORDINATE WITH PATIENT REGARDING LONG TERM PLACEMENT.  # DC WITH TAPERING DOSES OF PREDNISONE     # COPD EXACERBATION, ACUTE ON CHRONIC HYPOXIC & HYPERCAPNEIC RESPIRATORY FAILURE S/T NONCOMPLIANCE W/ TOBACCO CESSATION AND W/ BIPAP   - S/P MECHANICAL VENTILATION  - STARTED ON IV STEROIDS [TAPERING], SYMBICORT, SPIRIVA, AZITHROMYCIN, NEBS,  O2 SUPPLEMENTS  - NICOTINE PATCH  - CRITICAL CARE EVALUATION IN PROGRESS    - COUNSELLED PATIENT AT LENGTH, DISCUSSED REGARDING NONCOMPLIANCE W/ SMOKING CESSATION. PATIENT IS HIGH RISK FOR RE-INTUBATION. D/W PATIENT THAT HE IS HIGH RISK FOR RESPIRATORY FAILURE. HE VERBALIZED UNDERSTANDING. HE EXPRESSED THAT HE WOULD ATTEMPT TO ABSTAIN FROM SMOKING AND UTILIZE NICOTINE PATCHES AND WOULD TRY TO REMAIN COMPLIANT WITH NOCTURNAL BIPAP    # CONSTIPATION , URINARY RETENTION - WILL DC AMOR, ADD MIRALAX AND SENNA, TAP WATER ENEMA X 1 TIME       # UNCONTROLLED DM, GLUCOSE INTOLERANCE DUE TO STEROIDS  -  LANTUS, SSI + FS     # SMOKER, COUNSELLED TO QUIT SMOKING     # GI AND DVT PROPHYLAXIS   Patient is a 61y old  Male who presents with a chief complaint of acute hypoxic hypercapnic respiratory failure (28 Mar 2022 09:24)    PATIENT IS SEEN AND EXAMINED IN MEDICAL FLOOR.    ALLERGIES:  No Known Allergies    VITALS:    Vital Signs Last 24 Hrs  T(C): 36 (28 Mar 2022 21:14), Max: 36.9 (28 Mar 2022 12:31)  T(F): 96.8 (28 Mar 2022 21:14), Max: 98.4 (28 Mar 2022 12:31)  HR: 83 (29 Mar 2022 05:06) (73 - 84)  BP: 103/65 (29 Mar 2022 05:06) (103/65 - 126/64)  BP(mean): --  RR: 20 (29 Mar 2022 05:06) (20 - 20)  SpO2: 99% (29 Mar 2022 05:06) (97% - 100%)    LABS:    CBC Full  -  ( 28 Mar 2022 08:53 )  WBC Count : 8.00 K/uL  RBC Count : 4.13 M/uL  Hemoglobin : 11.7 g/dL  Hematocrit : 36.9 %  Platelet Count - Automated : 117 K/uL  Mean Cell Volume : 89.3 fl  Mean Cell Hemoglobin : 28.3 pg  Mean Cell Hemoglobin Concentration : 31.7 gm/dL  Auto Neutrophil # : 6.82 K/uL  Auto Lymphocyte # : 0.60 K/uL  Auto Monocyte # : 0.50 K/uL  Auto Eosinophil # : 0.00 K/uL  Auto Basophil # : 0.00 K/uL  Auto Neutrophil % : 85.2 %  Auto Lymphocyte % : 7.5 %  Auto Monocyte % : 6.3 %  Auto Eosinophil % : 0.0 %  Auto Basophil % : 0.0 %      03-29    134<L>  |  91<L>  |  15  ----------------------------<  280<H>  4.4   |  40<H>  |  0.62    Ca    8.5      29 Mar 2022 07:40    TPro  5.5<L>  /  Alb  2.8<L>  /  TBili  0.4  /  DBili  x   /  AST  10  /  ALT  35  /  AlkPhos  44  03-29    CAPILLARY BLOOD GLUCOSE      POCT Blood Glucose.: 248 mg/dL (29 Mar 2022 07:32)  POCT Blood Glucose.: 249 mg/dL (28 Mar 2022 22:32)  POCT Blood Glucose.: 473 mg/dL (28 Mar 2022 16:46)  POCT Blood Glucose.: 471 mg/dL (28 Mar 2022 16:44)  POCT Blood Glucose.: 472 mg/dL (28 Mar 2022 11:53)        LIVER FUNCTIONS - ( 29 Mar 2022 07:40 )  Alb: 2.8 g/dL / Pro: 5.5 g/dL / ALK PHOS: 44 U/L / ALT: 35 U/L DA / AST: 10 U/L / GGT: x           Creatinine Trend: 0.62<--, 0.65<--, 0.57<--, 0.59<--, 0.71<--, 0.71<--  I&O's Summary    28 Mar 2022 07:01  -  29 Mar 2022 07:00  --------------------------------------------------------  IN: 375 mL / OUT: 700 mL / NET: -325 mL            Clean Catch Clean Catch (Midstream)  03-25 @ 10:24   >100,000 CFU/ml Enterococcus faecalis  --  Enterococcus faecalis      .Blood Blood-Peripheral  03-25 @ 03:30   No growth to date.  --  --          MEDICATIONS:    MEDICATIONS  (STANDING):  ALBUTerol    90 MICROgram(s) HFA Inhaler 2 Puff(s) Inhalation every 6 hours  ALPRAZolam 0.5 milliGRAM(s) Oral every 12 hours  aspirin  chewable 81 milliGRAM(s) Oral daily  atorvastatin 40 milliGRAM(s) Oral at bedtime  budesonide 160 MICROgram(s)/formoterol 4.5 MICROgram(s) Inhaler 2 Puff(s) Inhalation two times a day  enoxaparin Injectable 40 milliGRAM(s) SubCutaneous every 24 hours  finasteride 5 milliGRAM(s) Oral daily  gabapentin 100 milliGRAM(s) Oral three times a day  glucagon  Injectable 1 milliGRAM(s) IntraMuscular once  insulin glargine Injectable (LANTUS) 8 Unit(s) SubCutaneous at bedtime  insulin lispro (ADMELOG) corrective regimen sliding scale   SubCutaneous Before meals and at bedtime  insulin lispro Injectable (ADMELOG) 4 Unit(s) SubCutaneous three times a day before meals  melatonin 5 milliGRAM(s) Oral at bedtime  nicotine - 21 mG/24Hr(s) Patch 1 patch Transdermal daily  pantoprazole  Injectable 40 milliGRAM(s) IV Push at bedtime  polyethylene glycol 3350 17 Gram(s) Oral daily  roflumilast 250 MICROGram(s) Oral daily  senna 2 Tablet(s) Oral at bedtime  tamsulosin 0.4 milliGRAM(s) Oral two times a day  tiotropium 18 MICROgram(s) Capsule 1 Capsule(s) Inhalation daily      MEDICATIONS  (PRN):  bisacodyl Suppository 10 milliGRAM(s) Rectal daily PRN Constipation  oxycodone    5 mG/acetaminophen 325 mG 1 Tablet(s) Oral every 6 hours PRN Severe Pain (7 - 10)      REVIEW OF SYSTEMS:                           ALL ROS DONE [ X   ]    CONSTITUTIONAL:  LETHARGIC [   ], FEVER [   ], UNRESPONSIVE [   ]  CVS:  CP  [   ], SOB, [   ], PALPITATIONS [   ], DIZZYNESS [   ]  RS: COUGH [   ], SPUTUM [   ]  GI: ABDOMINAL PAIN [   ], NAUSEA [   ], VOMITINGS [   ], DIARRHEA [   ], CONSTIPATION [   ]  :  DYSURIA [   ], NOCTURIA [   ], INCREASED FREQUENCY [   ], DRIBLING [   ],  SKELETAL: PAINFUL JOINTS [   ], SWOLLEN JOINTS [   ], NECK ACHE [   ], LOW BACK ACHE [   ],  SKIN : ULCERS [   ], RASH [   ], ITCHING [   ]  CNS: HEAD ACHE [   ], DOUBLE VISION [   ], BLURRED VISION [   ], AMS / CONFUSION [   ], SEIZURES [   ], WEAKNESS [   ],TINGLING / NUMBNESS [   ]      PHYSICAL EXAMINATION:    GENERAL APPEARANCE: NO DISTRESS  HEENT:  NO PALLOR, NO  JVD,  NO   NODES, NECK SUPPLE  CVS: S1 +, S2 +,   RS: AEEB,  OCCASIONAL  RALES +,  RONCHI +  ABD: SOFT, NT, NO, BS +  EXT: NO PE  SKIN: WARM,   SKELETAL:  ROM ACCEPTABLE  CNS:  AAO X 3        RADIOLOGY :    ACC: [id number] EXAM:  XR CHEST PORTABLE IMMED 1V                          PROCEDURE DATE:  03/25/2022          INTERPRETATION:  AP chest on March 25, 2022 at 2:02 AM. Patient was   intubated.    Heart magnified by technique.    Right lateral chest is excluded from the study.    Minimal infiltrate at right base somewhat increased from March 24.    At endotracheal tube and nasogastric tube in mid inserted.    IMPRESSION: Minimal right base infiltrate. Tubes inserted.      ASSESSMENT :       PLAN:  HPI:  61 year old man from [facility], active smoker, with a PMHx of CAD, CHF on nocturnal BIPAP, COPD (history of multiple intubation in past), peripheral neuropathy, GERD, HTN, HLD, obesity, polyarthritis, Pulmonary HTN, TIA, Vit D Def, and recent readmissionX2 to [facility] for respiratory failure requiring intubation ( 3/9 - 3/15, and ,most recent: 3/18-3/21), re-presented for acute on chronic respiratory failure with hypoxia and hypercapnia. History was limited to NH chart note review , coming in for weakness/AMS as per EMS. pt was not providing any further history. patient was placed on Bi-PAP in ED for ~2hours for acute on chronic hypoxic [id number] respiratory failure , with no improvement in mental status and blood gas , Patient intubated in ED on 3/25/2022 @~1am , right lip 23cm. pt received Bolus NS X2 for soft blood pressure s/p intubation. ICU consulted for close monitoring.      Of note : patient is known for being non adherent with his BiPAP      #  DC PLAN IN AM TO [facility] SINCE HE REQUIRES HIGHER LEVEL OF CARE WHICH [facility] ASSISTED LIVING IS UNABLE TO PROVIDE; EXTENSIVE DISCUSSION WITH PATIENT AND [facility]  REGARDING PATIENT'S CARE NEEDS. PATIENT EXPRESSED THAT HE ONLY WANTS TO LIVE IN A BUILDING WHERE HE WILL BE ALLOWED TO INTERMITTENTLY SMOKE CIGARETTES. HE EXPRESSED UNDERSTANDING THAT SMOKING COULD RESULT IN WORSENING RESPIRATORY DISEASE, MALIGNANCY AND EVEN DEATH. HE STATES HIS GOC IS FULL CODE AND UNDERSTANDS THE RISKS OF REQUIRING TRACHEOSTOMY IN THE FUTURE. CM TEAM WILL COORDINATE WITH PATIENT REGARDING LONG TERM PLACEMENT.  # DC WITH TAPERING DOSES OF PREDNISONE     # COPD EXACERBATION, ACUTE ON CHRONIC HYPOXIC & HYPERCAPNEIC RESPIRATORY FAILURE S/T NONCOMPLIANCE W/ TOBACCO CESSATION AND W/ BIPAP   - S/P MECHANICAL VENTILATION  - STARTED ON IV STEROIDS [TAPERING], SYMBICORT, SPIRIVA, AZITHROMYCIN, NEBS,  O2 SUPPLEMENTS, DALIRESP  - NICOTINE PATCH  - CRITICAL CARE EVALUATION IN PROGRESS    - COUNSELLED PATIENT AT LENGTH, DISCUSSED REGARDING NONCOMPLIANCE W/ SMOKING CESSATION. PATIENT IS HIGH RISK FOR RE-INTUBATION. D/W PATIENT THAT HE IS HIGH RISK FOR RESPIRATORY FAILURE. HE VERBALIZED UNDERSTANDING. HE EXPRESSED THAT HE WOULD ATTEMPT TO ABSTAIN FROM SMOKING AND UTILIZE NICOTINE PATCHES AND WOULD TRY TO REMAIN COMPLIANT WITH NOCTURNAL BIPAP    # CONSTIPATION , URINARY RETENTION - WILL DC AMOR, ADD MIRALAX AND SENNA, TAP WATER ENEMA X 1 TIME       # UNCONTROLLED DM, GLUCOSE INTOLERANCE DUE TO STEROIDS  -  LANTUS, SSI + FS     # SMOKER, COUNSELLED TO QUIT SMOKING     # GI AND DVT PROPHYLAXIS

## 2022-03-30 DIAGNOSIS — Z29.9 ENCOUNTER FOR PROPHYLACTIC MEASURES, UNSPECIFIED: ICD-10-CM

## 2022-03-30 RX ORDER — INSULIN LISPRO 100/ML
4 VIAL (ML) SUBCUTANEOUS ONCE
Refills: 0 | Status: COMPLETED | OUTPATIENT
Start: 2022-03-30 | End: 2022-03-30

## 2022-03-30 RX ORDER — ALPRAZOLAM 0.25 MG
0.25 TABLET ORAL ONCE
Refills: 0 | Status: DISCONTINUED | OUTPATIENT
Start: 2022-03-30 | End: 2022-03-30

## 2022-03-30 RX ORDER — INSULIN GLARGINE 100 [IU]/ML
10 INJECTION, SOLUTION SUBCUTANEOUS AT BEDTIME
Refills: 0 | Status: DISCONTINUED | OUTPATIENT
Start: 2022-03-30 | End: 2022-03-31

## 2022-03-30 RX ORDER — INSULIN LISPRO 100/ML
VIAL (ML) SUBCUTANEOUS
Refills: 0 | Status: DISCONTINUED | OUTPATIENT
Start: 2022-03-30 | End: 2022-03-31

## 2022-03-30 RX ORDER — INSULIN LISPRO 100/ML
VIAL (ML) SUBCUTANEOUS AT BEDTIME
Refills: 0 | Status: DISCONTINUED | OUTPATIENT
Start: 2022-03-30 | End: 2022-03-31

## 2022-03-30 RX ADMIN — BUDESONIDE AND FORMOTEROL FUMARATE DIHYDRATE 2 PUFF(S): 160; 4.5 AEROSOL RESPIRATORY (INHALATION) at 09:09

## 2022-03-30 RX ADMIN — Medication 4 UNIT(S): at 12:31

## 2022-03-30 RX ADMIN — PANTOPRAZOLE SODIUM 40 MILLIGRAM(S): 20 TABLET, DELAYED RELEASE ORAL at 21:05

## 2022-03-30 RX ADMIN — Medication 81 MILLIGRAM(S): at 12:07

## 2022-03-30 RX ADMIN — BUDESONIDE AND FORMOTEROL FUMARATE DIHYDRATE 2 PUFF(S): 160; 4.5 AEROSOL RESPIRATORY (INHALATION) at 21:17

## 2022-03-30 RX ADMIN — GABAPENTIN 100 MILLIGRAM(S): 400 CAPSULE ORAL at 21:15

## 2022-03-30 RX ADMIN — OXYCODONE AND ACETAMINOPHEN 1 TABLET(S): 5; 325 TABLET ORAL at 21:45

## 2022-03-30 RX ADMIN — Medication 0.5 MILLIGRAM(S): at 18:16

## 2022-03-30 RX ADMIN — Medication 0.25 MILLIGRAM(S): at 13:25

## 2022-03-30 RX ADMIN — GABAPENTIN 100 MILLIGRAM(S): 400 CAPSULE ORAL at 07:08

## 2022-03-30 RX ADMIN — Medication 4: at 09:06

## 2022-03-30 RX ADMIN — GABAPENTIN 100 MILLIGRAM(S): 400 CAPSULE ORAL at 13:26

## 2022-03-30 RX ADMIN — TAMSULOSIN HYDROCHLORIDE 0.4 MILLIGRAM(S): 0.4 CAPSULE ORAL at 07:04

## 2022-03-30 RX ADMIN — Medication 8: at 18:17

## 2022-03-30 RX ADMIN — ROFLUMILAST 250 MICROGRAM(S): 500 TABLET ORAL at 12:04

## 2022-03-30 RX ADMIN — OXYCODONE AND ACETAMINOPHEN 1 TABLET(S): 5; 325 TABLET ORAL at 10:05

## 2022-03-30 RX ADMIN — Medication 6: at 12:05

## 2022-03-30 RX ADMIN — Medication 4 UNIT(S): at 12:04

## 2022-03-30 RX ADMIN — OXYCODONE AND ACETAMINOPHEN 1 TABLET(S): 5; 325 TABLET ORAL at 21:05

## 2022-03-30 RX ADMIN — INSULIN GLARGINE 10 UNIT(S): 100 INJECTION, SOLUTION SUBCUTANEOUS at 21:31

## 2022-03-30 RX ADMIN — Medication 4 UNIT(S): at 18:17

## 2022-03-30 RX ADMIN — ENOXAPARIN SODIUM 40 MILLIGRAM(S): 100 INJECTION SUBCUTANEOUS at 07:05

## 2022-03-30 RX ADMIN — Medication 5 MILLIGRAM(S): at 21:05

## 2022-03-30 RX ADMIN — Medication 4 UNIT(S): at 09:05

## 2022-03-30 RX ADMIN — Medication 0.5 MILLIGRAM(S): at 07:08

## 2022-03-30 RX ADMIN — OXYCODONE AND ACETAMINOPHEN 1 TABLET(S): 5; 325 TABLET ORAL at 09:05

## 2022-03-30 RX ADMIN — SENNA PLUS 2 TABLET(S): 8.6 TABLET ORAL at 21:06

## 2022-03-30 RX ADMIN — ATORVASTATIN CALCIUM 40 MILLIGRAM(S): 80 TABLET, FILM COATED ORAL at 21:06

## 2022-03-30 NOTE — PROGRESS NOTE ADULT - PROBLEM SELECTOR PLAN 10
Pt is from [facility]  Continue BIPAP qHS.   Pt agreeable to discharge to [facility] .   Sister/HCP Chiquita Merrill  CM following.

## 2022-03-30 NOTE — PROGRESS NOTE ADULT - PROBLEM SELECTOR PLAN 1
due to noncompliance with BIPAP   Improving.   Continue qHS  BIPAP IPAP 14/EPAP 6/FiO2 40%/Backup RR 14. O2 2-3L NC during the day   Continue bronchodilators and ICS  Completed course of steroids.   Daliresp starting on 3/28. Will need Daliresp 250mg daily for 30 days and then 500mg daily. Would benefit from outpatient PFTs and Pulmonary

## 2022-03-30 NOTE — PROGRESS NOTE ADULT - SUBJECTIVE AND OBJECTIVE BOX
DEVIKA CARBALLO    SCU progress note    INTERVAL HPI/OVERNIGHT EVENTS: ***    DNR [ ]   DNI  [  ]    Covid - 19 PCR: COVID-19 PCR: NotDetec (28 Mar 2022 15:00)  COVID-19 PCR: NotDetec (24 Mar 2022 23:11)  SARS-CoV-2: NotDetec (18 Mar 2022 11:07)  COVID-19 PCR: NotDetec (14 Mar 2022 11:17)  COVID-19 PCR: NotDetec (09 Mar 2022 12:42)  SARS-CoV-2: NotDetec (13 Nov 2021 09:39)      The 4Ms    What Matters Most: see GOC  Age appropriate Medications/Screen for High Risk Medication: Yes  Mentation: see CAM below  Mobility: defer to physical exam    The Confusion Assessment Method (CAM) Diagnostic Algorithm     1: Acute Onset or Fluctuating Course  - Is there evidence of an acute change in mental status from the patient’s baseline? Did the (abnormal) behavior  fluctuate during the day, that is, tend to come and go, or increase and decrease in severity?  [ ] YES [ ] NO     2: Inattention  - Did the patient have difficulty focusing attention, being easily distractible, or having difficulty keeping track of what was being said?  [ ] YES [ ] NO     3: Disorganized thinking  -Was the patient’s thinking disorganized or incoherent, such as rambling or irrelevant conversation, unclear or illogical flow of ideas, or unpredictable switching from subject to subject?  [ ] YES [ ] NO    4: Altered Level of consciousness?  [ ] YES [ ] NO    The diagnosis of delirium by CAM requires the presence of features 1 and 2 and either 3 or 4.    PRESSORS: [ ] YES [ ] NO    Cardiovascular:  Heart Failure  Acute   Acute on Chronic  Chronic       tamsulosin 0.4 milliGRAM(s) Oral two times a day    Pulmonary:  ALBUTerol    90 MICROgram(s) HFA Inhaler 2 Puff(s) Inhalation every 6 hours  budesonide 160 MICROgram(s)/formoterol 4.5 MICROgram(s) Inhaler 2 Puff(s) Inhalation two times a day  roflumilast 250 MICROGram(s) Oral daily  tiotropium 18 MICROgram(s) Capsule 1 Capsule(s) Inhalation daily    Hematalogic:  aspirin  chewable 81 milliGRAM(s) Oral daily  enoxaparin Injectable 40 milliGRAM(s) SubCutaneous every 24 hours    Other:  ALPRAZolam 0.5 milliGRAM(s) Oral every 12 hours  atorvastatin 40 milliGRAM(s) Oral at bedtime  bisacodyl Suppository 10 milliGRAM(s) Rectal daily PRN  finasteride 5 milliGRAM(s) Oral daily  gabapentin 100 milliGRAM(s) Oral three times a day  glucagon  Injectable 1 milliGRAM(s) IntraMuscular once  insulin glargine Injectable (LANTUS) 8 Unit(s) SubCutaneous at bedtime  insulin lispro (ADMELOG) corrective regimen sliding scale   SubCutaneous Before meals and at bedtime  insulin lispro Injectable (ADMELOG) 4 Unit(s) SubCutaneous three times a day before meals  melatonin 5 milliGRAM(s) Oral at bedtime  nicotine - 21 mG/24Hr(s) Patch 1 patch Transdermal daily  oxycodone    5 mG/acetaminophen 325 mG 1 Tablet(s) Oral every 6 hours PRN  pantoprazole  Injectable 40 milliGRAM(s) IV Push at bedtime  polyethylene glycol 3350 17 Gram(s) Oral daily  senna 2 Tablet(s) Oral at bedtime    ALBUTerol    90 MICROgram(s) HFA Inhaler 2 Puff(s) Inhalation every 6 hours  ALPRAZolam 0.5 milliGRAM(s) Oral every 12 hours  aspirin  chewable 81 milliGRAM(s) Oral daily  atorvastatin 40 milliGRAM(s) Oral at bedtime  bisacodyl Suppository 10 milliGRAM(s) Rectal daily PRN  budesonide 160 MICROgram(s)/formoterol 4.5 MICROgram(s) Inhaler 2 Puff(s) Inhalation two times a day  enoxaparin Injectable 40 milliGRAM(s) SubCutaneous every 24 hours  finasteride 5 milliGRAM(s) Oral daily  gabapentin 100 milliGRAM(s) Oral three times a day  glucagon  Injectable 1 milliGRAM(s) IntraMuscular once  insulin glargine Injectable (LANTUS) 8 Unit(s) SubCutaneous at bedtime  insulin lispro (ADMELOG) corrective regimen sliding scale   SubCutaneous Before meals and at bedtime  insulin lispro Injectable (ADMELOG) 4 Unit(s) SubCutaneous three times a day before meals  melatonin 5 milliGRAM(s) Oral at bedtime  nicotine - 21 mG/24Hr(s) Patch 1 patch Transdermal daily  oxycodone    5 mG/acetaminophen 325 mG 1 Tablet(s) Oral every 6 hours PRN  pantoprazole  Injectable 40 milliGRAM(s) IV Push at bedtime  polyethylene glycol 3350 17 Gram(s) Oral daily  roflumilast 250 MICROGram(s) Oral daily  senna 2 Tablet(s) Oral at bedtime  tamsulosin 0.4 milliGRAM(s) Oral two times a day  tiotropium 18 MICROgram(s) Capsule 1 Capsule(s) Inhalation daily    Drug Dosing Weight  Height (cm): 172.7 (24 Mar 2022 22:42)  Weight (kg): 93.043 (26 Mar 2022 07:08)  BMI (kg/m2): 31.2 (26 Mar 2022 07:08)  BSA (m2): 2.07 (26 Mar 2022 07:08)    CENTRAL LINE: [ ] YES [ ] NO  LOCATION:   DATE INSERTED:  REMOVE: [ ] YES [ ] NO  EXPLAIN:    AMOR: [ ] YES [ ] NO    DATE INSERTED:  REMOVE:  [ ] YES [ ] NO  EXPLAIN:    PAST MEDICAL & SURGICAL HISTORY:  COPD (chronic obstructive pulmonary disease)  Oxygen 2-3L at home    Stented coronary artery  2017    HLD (hyperlipidemia)    Pulmonary HTN    Type 2 diabetes mellitus without complication, with long-term current use of insulin    Coronary artery disease involving native coronary artery of native heart without angina pectoris    Smoker    Gastroesophageal reflux disease, esophagitis presence not specified    Oxygen dependent    DM (diabetes mellitus)    CAD (coronary artery disease)    GERD (gastroesophageal reflux disease)    Insomnia    CHF (congestive heart failure)    HTN (hypertension)    Mood disorder    No significant past surgical history                      PHYSICAL EXAM:          LABS:  CBC Full  -  ( 28 Mar 2022 08:53 )  WBC Count : 8.00 K/uL  RBC Count : 4.13 M/uL  Hemoglobin : 11.7 g/dL  Hematocrit : 36.9 %  Platelet Count - Automated : 117 K/uL  Mean Cell Volume : 89.3 fl  Mean Cell Hemoglobin : 28.3 pg  Mean Cell Hemoglobin Concentration : 31.7 gm/dL  Auto Neutrophil # : 6.82 K/uL  Auto Lymphocyte # : 0.60 K/uL  Auto Monocyte # : 0.50 K/uL  Auto Eosinophil # : 0.00 K/uL  Auto Basophil # : 0.00 K/uL  Auto Neutrophil % : 85.2 %  Auto Lymphocyte % : 7.5 %  Auto Monocyte % : 6.3 %  Auto Eosinophil % : 0.0 %  Auto Basophil % : 0.0 %    03-29    134<L>  |  91<L>  |  15  ----------------------------<  280<H>  4.4   |  40<H>  |  0.62    Ca    8.5      29 Mar 2022 07:40    TPro  5.5<L>  /  Alb  2.8<L>  /  TBili  0.4  /  DBili  x   /  AST  10  /  ALT  35  /  AlkPhos  44  03-29              [  ]  DVT Prophylaxis  [  ]   Abnormal Nutritional Status -  Malnutrition   Cachexia      Morbid Obesity BMI >/=40  Diet, DASH/TLC:   Sodium & Cholesterol Restricted  Consistent Carbohydrate Evening Snacks (03-28-22 @ 14:43) [Active]          RADIOLOGY & ADDITIONAL STUDIES:  ***    Goals of Care Discussion with Family/Proxy/Other   - see note from/family meeting set up for...     DEVIKA CARBALLO    SCU progress note    INTERVAL HPI/OVERNIGHT EVENTS: ***No acte events overnight.     DNR [ ]   DNI  [  ] Full code.    Covid - 19 PCR: COVID-19 PCR: NotDetec (28 Mar 2022 15:00)  COVID-19 PCR: NotDetec (24 Mar 2022 23:11)  SARS-CoV-2: NotDetec (18 Mar 2022 11:07)  COVID-19 PCR: NotDetec (14 Mar 2022 11:17)  COVID-19 PCR: NotDetec (09 Mar 2022 12:42)  SARS-CoV-2: NotDetec (13 Nov 2021 09:39)      The 4Ms    What Matters Most: see GOC  Age appropriate Medications/Screen for High Risk Medication: Yes  Mentation: see CAM below  Mobility: defer to physical exam    The Confusion Assessment Method (CAM) Diagnostic Algorithm     1: Acute Onset or Fluctuating Course  - Is there evidence of an acute change in mental status from the patient’s baseline? Did the (abnormal) behavior  fluctuate during the day, that is, tend to come and go, or increase and decrease in severity?  [ ] YES [ x] NO     2: Inattention  - Did the patient have difficulty focusing attention, being easily distractible, or having difficulty keeping track of what was being said?  [ ] YES [x ] NO     3: Disorganized thinking  -Was the patient’s thinking disorganized or incoherent, such as rambling or irrelevant conversation, unclear or illogical flow of ideas, or unpredictable switching from subject to subject?  [ ] YES [x ] NO    4: Altered Level of consciousness?  [ ] YES [ x] NO    The diagnosis of delirium by CAM requires the presence of features 1 and 2 and either 3 or 4.    PRESSORS: [ ] YES [ x] NO    Cardiovascular:  Heart Failure   Acute on Chronic    tamsulosin 0.4 milliGRAM(s) Oral two times a day    Pulmonary:  ALBUTerol    90 MICROgram(s) HFA Inhaler 2 Puff(s) Inhalation every 6 hours  budesonide 160 MICROgram(s)/formoterol 4.5 MICROgram(s) Inhaler 2 Puff(s) Inhalation two times a day  roflumilast 250 MICROGram(s) Oral daily  tiotropium 18 MICROgram(s) Capsule 1 Capsule(s) Inhalation daily    Hematalogic:  aspirin  chewable 81 milliGRAM(s) Oral daily  enoxaparin Injectable 40 milliGRAM(s) SubCutaneous every 24 hours    Other:  ALPRAZolam 0.5 milliGRAM(s) Oral every 12 hours  atorvastatin 40 milliGRAM(s) Oral at bedtime  bisacodyl Suppository 10 milliGRAM(s) Rectal daily PRN  finasteride 5 milliGRAM(s) Oral daily  gabapentin 100 milliGRAM(s) Oral three times a day  glucagon  Injectable 1 milliGRAM(s) IntraMuscular once  insulin glargine Injectable (LANTUS) 8 Unit(s) SubCutaneous at bedtime  insulin lispro (ADMELOG) corrective regimen sliding scale   SubCutaneous Before meals and at bedtime  insulin lispro Injectable (ADMELOG) 4 Unit(s) SubCutaneous three times a day before meals  melatonin 5 milliGRAM(s) Oral at bedtime  nicotine - 21 mG/24Hr(s) Patch 1 patch Transdermal daily  oxycodone    5 mG/acetaminophen 325 mG 1 Tablet(s) Oral every 6 hours PRN  pantoprazole  Injectable 40 milliGRAM(s) IV Push at bedtime  polyethylene glycol 3350 17 Gram(s) Oral daily  senna 2 Tablet(s) Oral at bedtime    ALBUTerol    90 MICROgram(s) HFA Inhaler 2 Puff(s) Inhalation every 6 hours  ALPRAZolam 0.5 milliGRAM(s) Oral every 12 hours  aspirin  chewable 81 milliGRAM(s) Oral daily  atorvastatin 40 milliGRAM(s) Oral at bedtime  bisacodyl Suppository 10 milliGRAM(s) Rectal daily PRN  budesonide 160 MICROgram(s)/formoterol 4.5 MICROgram(s) Inhaler 2 Puff(s) Inhalation two times a day  enoxaparin Injectable 40 milliGRAM(s) SubCutaneous every 24 hours  finasteride 5 milliGRAM(s) Oral daily  gabapentin 100 milliGRAM(s) Oral three times a day  glucagon  Injectable 1 milliGRAM(s) IntraMuscular once  insulin glargine Injectable (LANTUS) 8 Unit(s) SubCutaneous at bedtime  insulin lispro (ADMELOG) corrective regimen sliding scale   SubCutaneous Before meals and at bedtime  insulin lispro Injectable (ADMELOG) 4 Unit(s) SubCutaneous three times a day before meals  melatonin 5 milliGRAM(s) Oral at bedtime  nicotine - 21 mG/24Hr(s) Patch 1 patch Transdermal daily  oxycodone    5 mG/acetaminophen 325 mG 1 Tablet(s) Oral every 6 hours PRN  pantoprazole  Injectable 40 milliGRAM(s) IV Push at bedtime  polyethylene glycol 3350 17 Gram(s) Oral daily  roflumilast 250 MICROGram(s) Oral daily  senna 2 Tablet(s) Oral at bedtime  tamsulosin 0.4 milliGRAM(s) Oral two times a day  tiotropium 18 MICROgram(s) Capsule 1 Capsule(s) Inhalation daily    Drug Dosing Weight  Height (cm): 172.7 (24 Mar 2022 22:42)  Weight (kg): 93.043 (26 Mar 2022 07:08)  BMI (kg/m2): 31.2 (26 Mar 2022 07:08)  BSA (m2): 2.07 (26 Mar 2022 07:08)    CENTRAL LINE: [ ] YES [x ] NO  LOCATION:   DATE INSERTED:  REMOVE: [ ] YES [ ] NO  EXPLAIN:    AMOR: [ ] YES [x ] NO    DATE INSERTED:  REMOVE:  [ ] YES [ ] NO  EXPLAIN:    PAST MEDICAL & SURGICAL HISTORY:  COPD (chronic obstructive pulmonary disease)  Oxygen 2-3L at home    Stented coronary artery  2017    HLD (hyperlipidemia)    Pulmonary HTN    Type 2 diabetes mellitus without complication, with long-term current use of insulin    Coronary artery disease involving native coronary artery of native heart without angina pectoris    Smoker    Gastroesophageal reflux disease, esophagitis presence not specified    Oxygen dependent    DM (diabetes mellitus)    CAD (coronary artery disease)    GERD (gastroesophageal reflux disease)    Insomnia    CHF (congestive heart failure)    HTN (hypertension)    Mood disorder    No significant past surgical history    PHYSICAL EXAM:    GENERAL: NAD  HEAD:  Atraumatic, Normocephalic  EYES: EOMI, PERRLA, conjunctiva and sclera clear  ENMT: No tonsillar erythema, exudates, or enlargement; Moist mucous membranes, Good dentition, No lesions  NECK: Supple, No JVD, Normal thyroid  NERVOUS SYSTEM:  Alert & Oriented X3, Good concentration; Motor Strength 5/5 B/L upper and lower extremities; DTRs 2+ intact and symmetric  CHEST/LUNG: Clear to percussion bilaterally; No rales, rhonchi, wheezing, or rubs  HEART: Regular rate and rhythm; No murmurs, rubs, or gallops  ABDOMEN: Soft, Nontender, Nondistended; Bowel sounds present  EXTREMITIES:  2+ Peripheral Pulses, No clubbing, cyanosis, or edema  LYMPH: No lymphadenopathy noted  SKIN: No rashes or lesions    LABS:      CBC Full  -  ( 28 Mar 2022 08:53 )  WBC Count : 8.00 K/uL  RBC Count : 4.13 M/uL  Hemoglobin : 11.7 g/dL  Hematocrit : 36.9 %  Platelet Count - Automated : 117 K/uL  Mean Cell Volume : 89.3 fl  Mean Cell Hemoglobin : 28.3 pg  Mean Cell Hemoglobin Concentration : 31.7 gm/dL  Auto Neutrophil # : 6.82 K/uL  Auto Lymphocyte # : 0.60 K/uL  Auto Monocyte # : 0.50 K/uL  Auto Eosinophil # : 0.00 K/uL  Auto Basophil # : 0.00 K/uL  Auto Neutrophil % : 85.2 %  Auto Lymphocyte % : 7.5 %  Auto Monocyte % : 6.3 %  Auto Eosinophil % : 0.0 %  Auto Basophil % : 0.0 %    03-29      134<L>  |  91<L>  |  15  ----------------------------<  280<H>  4.4   |  40<H>  |  0.62    Ca    8.5      29 Mar 2022 07:40    TPro  5.5<L>  /  Alb  2.8<L>  /  TBili  0.4  /  DBili  x   /  AST  10  /  ALT  35  /  AlkPhos  44  03-29              [  ]  DVT Prophylaxis  [  ]   Abnormal Nutritional Status -  Malnutrition   Cachexia      Morbid Obesity BMI >/=40  Diet, DASH/TLC:   Sodium & Cholesterol Restricted  Consistent Carbohydrate Evening Snacks (03-28-22 @ 14:43) [Active]          RADIOLOGY & ADDITIONAL STUDIES:  ***    Goals of Care Discussion with Family/Proxy/Other   - see note from/family meeting set up for...     DEVIKA CARBALLO    SCU progress note    INTERVAL HPI/OVERNIGHT EVENTS: ***No acte events overnight.     DNR [ ]   DNI  [  ] Full code.    Covid - 19 PCR: COVID-19 PCR: NotDetec (28 Mar 2022 15:00)  COVID-19 PCR: NotDetec (24 Mar 2022 23:11)  SARS-CoV-2: NotDetec (18 Mar 2022 11:07)  COVID-19 PCR: NotDetec (14 Mar 2022 11:17)  COVID-19 PCR: NotDetec (09 Mar 2022 12:42)  SARS-CoV-2: NotDetec (13 Nov 2021 09:39)      The 4Ms    What Matters Most: see GOC  Age appropriate Medications/Screen for High Risk Medication: Yes  Mentation: see CAM below  Mobility: defer to physical exam    The Confusion Assessment Method (CAM) Diagnostic Algorithm     1: Acute Onset or Fluctuating Course  - Is there evidence of an acute change in mental status from the patient’s baseline? Did the (abnormal) behavior  fluctuate during the day, that is, tend to come and go, or increase and decrease in severity?  [ ] YES [ x] NO     2: Inattention  - Did the patient have difficulty focusing attention, being easily distractible, or having difficulty keeping track of what was being said?  [ ] YES [x ] NO     3: Disorganized thinking  -Was the patient’s thinking disorganized or incoherent, such as rambling or irrelevant conversation, unclear or illogical flow of ideas, or unpredictable switching from subject to subject?  [ ] YES [x ] NO    4: Altered Level of consciousness?  [ ] YES [ x] NO    The diagnosis of delirium by CAM requires the presence of features 1 and 2 and either 3 or 4.    PRESSORS: [ ] YES [ x] NO    Cardiovascular:  Heart Failure   Acute on Chronic    tamsulosin 0.4 milliGRAM(s) Oral two times a day    Pulmonary:  ALBUTerol    90 MICROgram(s) HFA Inhaler 2 Puff(s) Inhalation every 6 hours  budesonide 160 MICROgram(s)/formoterol 4.5 MICROgram(s) Inhaler 2 Puff(s) Inhalation two times a day  roflumilast 250 MICROGram(s) Oral daily  tiotropium 18 MICROgram(s) Capsule 1 Capsule(s) Inhalation daily    Hematalogic:  aspirin  chewable 81 milliGRAM(s) Oral daily  enoxaparin Injectable 40 milliGRAM(s) SubCutaneous every 24 hours    Other:  ALPRAZolam 0.5 milliGRAM(s) Oral every 12 hours  atorvastatin 40 milliGRAM(s) Oral at bedtime  bisacodyl Suppository 10 milliGRAM(s) Rectal daily PRN  finasteride 5 milliGRAM(s) Oral daily  gabapentin 100 milliGRAM(s) Oral three times a day  glucagon  Injectable 1 milliGRAM(s) IntraMuscular once  insulin glargine Injectable (LANTUS) 8 Unit(s) SubCutaneous at bedtime  insulin lispro (ADMELOG) corrective regimen sliding scale   SubCutaneous Before meals and at bedtime  insulin lispro Injectable (ADMELOG) 4 Unit(s) SubCutaneous three times a day before meals  melatonin 5 milliGRAM(s) Oral at bedtime  nicotine - 21 mG/24Hr(s) Patch 1 patch Transdermal daily  oxycodone    5 mG/acetaminophen 325 mG 1 Tablet(s) Oral every 6 hours PRN  pantoprazole  Injectable 40 milliGRAM(s) IV Push at bedtime  polyethylene glycol 3350 17 Gram(s) Oral daily  senna 2 Tablet(s) Oral at bedtime    ALBUTerol    90 MICROgram(s) HFA Inhaler 2 Puff(s) Inhalation every 6 hours  ALPRAZolam 0.5 milliGRAM(s) Oral every 12 hours  aspirin  chewable 81 milliGRAM(s) Oral daily  atorvastatin 40 milliGRAM(s) Oral at bedtime  bisacodyl Suppository 10 milliGRAM(s) Rectal daily PRN  budesonide 160 MICROgram(s)/formoterol 4.5 MICROgram(s) Inhaler 2 Puff(s) Inhalation two times a day  enoxaparin Injectable 40 milliGRAM(s) SubCutaneous every 24 hours  finasteride 5 milliGRAM(s) Oral daily  gabapentin 100 milliGRAM(s) Oral three times a day  glucagon  Injectable 1 milliGRAM(s) IntraMuscular once  insulin glargine Injectable (LANTUS) 8 Unit(s) SubCutaneous at bedtime  insulin lispro (ADMELOG) corrective regimen sliding scale   SubCutaneous Before meals and at bedtime  insulin lispro Injectable (ADMELOG) 4 Unit(s) SubCutaneous three times a day before meals  melatonin 5 milliGRAM(s) Oral at bedtime  nicotine - 21 mG/24Hr(s) Patch 1 patch Transdermal daily  oxycodone    5 mG/acetaminophen 325 mG 1 Tablet(s) Oral every 6 hours PRN  pantoprazole  Injectable 40 milliGRAM(s) IV Push at bedtime  polyethylene glycol 3350 17 Gram(s) Oral daily  roflumilast 250 MICROGram(s) Oral daily  senna 2 Tablet(s) Oral at bedtime  tamsulosin 0.4 milliGRAM(s) Oral two times a day  tiotropium 18 MICROgram(s) Capsule 1 Capsule(s) Inhalation daily    Drug Dosing Weight  Height (cm): 172.7 (24 Mar 2022 22:42)  Weight (kg): 93.043 (26 Mar 2022 07:08)  BMI (kg/m2): 31.2 (26 Mar 2022 07:08)  BSA (m2): 2.07 (26 Mar 2022 07:08)    CENTRAL LINE: [ ] YES [x ] NO  LOCATION:   DATE INSERTED:  REMOVE: [ ] YES [ ] NO  EXPLAIN:    AMOR: [ ] YES [x ] NO    DATE INSERTED:  REMOVE:  [ ] YES [ ] NO  EXPLAIN:    PAST MEDICAL & SURGICAL HISTORY:  COPD (chronic obstructive pulmonary disease)  Oxygen 2-3L at home    Stented coronary artery  2017    HLD (hyperlipidemia)    Pulmonary HTN    Type 2 diabetes mellitus without complication, with long-term current use of insulin    Coronary artery disease involving native coronary artery of native heart without angina pectoris    Smoker    Gastroesophageal reflux disease, esophagitis presence not specified    Oxygen dependent    DM (diabetes mellitus)    CAD (coronary artery disease)    GERD (gastroesophageal reflux disease)    Insomnia    CHF (congestive heart failure)    HTN (hypertension)    Mood disorder    No significant past surgical history    PHYSICAL EXAM:    GENERAL: NAD  HEAD:  Atraumatic, Normocephalic  EYES: EOMI, PERRLA, conjunctiva and sclera clear  ENMT: No tonsillar erythema, exudates, or enlargement; Moist mucous membranes, Good dentition, No lesions  NECK: Supple, No JVD, Normal thyroid  NERVOUS SYSTEM:  Alert & Oriented X3, Good concentration; Motor Strength 5/5 B/L upper and lower extremities; DTRs 2+ intact and symmetric  CHEST/LUNG: Clear to percussion bilaterally; No rales, rhonchi, wheezing, or rubs  HEART: Regular rate and rhythm; No murmurs, rubs, or gallops  ABDOMEN: Soft, Nontender, Nondistended; Bowel sounds present  EXTREMITIES:  2+ Peripheral Pulses, No clubbing, cyanosis, or edema  LYMPH: No lymphadenopathy noted  SKIN: Stage 1 pressure injury  bilat ischium    LABS:        CBC Full  -  ( 28 Mar 2022 08:53 )  WBC Count : 8.00 K/uL  RBC Count : 4.13 M/uL  Hemoglobin : 11.7 g/dL  Hematocrit : 36.9 %  Platelet Count - Automated : 117 K/uL  Mean Cell Volume : 89.3 fl  Mean Cell Hemoglobin : 28.3 pg  Mean Cell Hemoglobin Concentration : 31.7 gm/dL  Auto Neutrophil # : 6.82 K/uL  Auto Lymphocyte # : 0.60 K/uL  Auto Monocyte # : 0.50 K/uL  Auto Eosinophil # : 0.00 K/uL  Auto Basophil # : 0.00 K/uL  Auto Neutrophil % : 85.2 %  Auto Lymphocyte % : 7.5 %  Auto Monocyte % : 6.3 %  Auto Eosinophil % : 0.0 %  Auto Basophil % : 0.0 %  03-29    134<L>  |  91<L>  |  15  ----------------------------<  280<H>  4.4   |  40<H>  |  0.62    Ca    8.5      29 Mar 2022 07:40    TPro  5.5<L>  /  Alb  2.8<L>  /  TBili  0.4  /  DBili  x   /  AST  10  /  ALT  35  /  AlkPhos  44  03-29        [  ]  DVT Prophylaxis  [  ]   Abnormal Nutritional Status -  Malnutrition   Cachexia      Morbid Obesity BMI >/=40  Diet, DASH/TLC:   Sodium & Cholesterol Restricted  Consistent Carbohydrate Evening Snacks (03-28-22 @ 14:43) [Active]          RADIOLOGY & ADDITIONAL STUDIES:  ***    < from: Xray Chest 1 View-PORTABLE IMMEDIATE (Xray Chest 1 View-PORTABLE IMMEDIATE .) (03.25.22 @ 02:55) >    INTERPRETATION:  AP chest on March 25, 2022 at 2:02 AM. Patient was   intubated.    Heart magnified by technique.    Right lateral chest is excluded from the study.    Minimal infiltrate at right base somewhat increased from March 24.    At endotracheal tube and nasogastric tube in mid inserted.    IMPRESSION: Minimal right base infiltrate. Tubes inserted.    --- End of Report ---    < end of copied text >  < from: Xray Chest 1 View-PORTABLE IMMEDIATE (03.24.22 @ 23:41) >  AP view of the chest demonstrates the lungs to be clear. There is no   pleural effusion. There is no pneumothorax.    The heart is normal in size. There is no mediastinal or hilar mass. The   pulmonary arteries are prominent, unchanged from examinations dating back   to 12/3/2019    The pulmonary vasculature is normal.    Mild thoracic degenerative changes are present.    IMPRESSION:    No acute infiltrate.    Stable large pulmonary arteries. Rule out pulmonary artery hypertension.    --- End of Report ---    < end of copied text >      Goals of Care Discussion with Family/Proxy/Other   - see note from 3/20/22

## 2022-03-30 NOTE — PROGRESS NOTE ADULT - ASSESSMENT
61 year old man from [facility], active smoker, with a PMHx of CAD, CHF on nocturnal BIPAP, COPD ( MULTIPLE intubations in past), peripheral neuropathy, GERD, HTN, HLD, obesity, polyarthritis, Pulmonary HTN, TIA, Vit D Def, and recent admission to [facility] for respiratory failure requiring intubation (3/9 - 15 and again on 3/18 - 21), who re-presented to [facility] on 3/24 for acute on chronic respiratory failure with hypoxia and hypercapnia. Has Trilogy and oxygen at [facility]. In the ED he was obtunded, with CO2 greater than 125. Tried on BiPAP without improvement, and subsequently intubated and transferred to ICU. In ICU he was re-started on COPD medications, quickly weaned off ventilator and extubated. Consults placed with PT and social work. Collateral information obtained from assisted living, current episode likely triggered by smoking and/or non-adherence to medications. Tejada removed 3/25. Bladder scan at 10pm shows 450cc retention. Tejada cath reinserted.  3/25 Transferred from ICU to SCU.    Daliresp ordered to begin on 3/28 in hope of better COPD control in this pt w/ multiple readmissions and intubations.

## 2022-03-30 NOTE — PROGRESS NOTE ADULT - SUBJECTIVE AND OBJECTIVE BOX
Patient is a 61y old  Male who presents with a chief complaint of acute hypoxic hypercapnic respiratory failure (30 Mar 2022 07:41)    PATIENT IS SEEN AND EXAMINED IN MEDICAL FLOOR.  JAMILA [    ]    MT [   ]      GT [   ]    ALLERGIES:  No Known Allergies      Daily     Daily     VITALS:    Vital Signs Last 24 Hrs  T(C): 36.1 (30 Mar 2022 04:30), Max: 36.6 (29 Mar 2022 14:42)  T(F): 96.9 (30 Mar 2022 04:30), Max: 97.8 (29 Mar 2022 14:42)  HR: 86 (30 Mar 2022 07:03) (61 - 86)  BP: 105/72 (30 Mar 2022 07:03) (94/36 - 115/69)  BP(mean): --  RR: 16 (30 Mar 2022 04:30) (16 - 22)  SpO2: 99% (30 Mar 2022 04:30) (97% - 99%)    LABS:        03-29    134<L>  |  91<L>  |  15  ----------------------------<  280<H>  4.4   |  40<H>  |  0.62    Ca    8.5      29 Mar 2022 07:40    TPro  5.5<L>  /  Alb  2.8<L>  /  TBili  0.4  /  DBili  x   /  AST  10  /  ALT  35  /  AlkPhos  44  03-29    CAPILLARY BLOOD GLUCOSE      POCT Blood Glucose.: 320 mg/dL (30 Mar 2022 09:00)  POCT Blood Glucose.: 259 mg/dL (30 Mar 2022 07:41)  POCT Blood Glucose.: 393 mg/dL (29 Mar 2022 21:42)  POCT Blood Glucose.: 377 mg/dL (29 Mar 2022 17:16)  POCT Blood Glucose.: 278 mg/dL (29 Mar 2022 11:51)        LIVER FUNCTIONS - ( 29 Mar 2022 07:40 )  Alb: 2.8 g/dL / Pro: 5.5 g/dL / ALK PHOS: 44 U/L / ALT: 35 U/L DA / AST: 10 U/L / GGT: x           Creatinine Trend: 0.62<--, 0.65<--, 0.57<--, 0.59<--, 0.71<--, 0.71<--  I&O's Summary          Clean Catch Clean Catch (Midstream)  03-25 @ 10:24   >100,000 CFU/ml Enterococcus faecalis  --  Enterococcus faecalis      .Blood Blood-Peripheral  03-25 @ 03:30   No Growth Final  --  --          MEDICATIONS:    MEDICATIONS  (STANDING):  ALBUTerol    90 MICROgram(s) HFA Inhaler 2 Puff(s) Inhalation every 6 hours  ALPRAZolam 0.5 milliGRAM(s) Oral every 12 hours  aspirin  chewable 81 milliGRAM(s) Oral daily  atorvastatin 40 milliGRAM(s) Oral at bedtime  budesonide 160 MICROgram(s)/formoterol 4.5 MICROgram(s) Inhaler 2 Puff(s) Inhalation two times a day  enoxaparin Injectable 40 milliGRAM(s) SubCutaneous every 24 hours  finasteride 5 milliGRAM(s) Oral daily  gabapentin 100 milliGRAM(s) Oral three times a day  glucagon  Injectable 1 milliGRAM(s) IntraMuscular once  insulin glargine Injectable (LANTUS) 8 Unit(s) SubCutaneous at bedtime  insulin lispro (ADMELOG) corrective regimen sliding scale   SubCutaneous Before meals and at bedtime  insulin lispro Injectable (ADMELOG) 4 Unit(s) SubCutaneous three times a day before meals  melatonin 5 milliGRAM(s) Oral at bedtime  nicotine - 21 mG/24Hr(s) Patch 1 patch Transdermal daily  pantoprazole  Injectable 40 milliGRAM(s) IV Push at bedtime  polyethylene glycol 3350 17 Gram(s) Oral daily  roflumilast 250 MICROGram(s) Oral daily  senna 2 Tablet(s) Oral at bedtime  tamsulosin 0.4 milliGRAM(s) Oral two times a day  tiotropium 18 MICROgram(s) Capsule 1 Capsule(s) Inhalation daily      MEDICATIONS  (PRN):  bisacodyl Suppository 10 milliGRAM(s) Rectal daily PRN Constipation  oxycodone    5 mG/acetaminophen 325 mG 1 Tablet(s) Oral every 6 hours PRN Severe Pain (7 - 10)      REVIEW OF SYSTEMS:                           ALL ROS DONE [ X   ]    CONSTITUTIONAL:  LETHARGIC [   ], FEVER [   ], UNRESPONSIVE [   ]  CVS:  CP  [   ], SOB, [   ], PALPITATIONS [   ], DIZZYNESS [   ]  RS: COUGH [   ], SPUTUM [   ]  GI: ABDOMINAL PAIN [   ], NAUSEA [   ], VOMITINGS [   ], DIARRHEA [   ], CONSTIPATION [   ]  :  DYSURIA [   ], NOCTURIA [   ], INCREASED FREQUENCY [   ], DRIBLING [   ],  SKELETAL: PAINFUL JOINTS [   ], SWOLLEN JOINTS [   ], NECK ACHE [   ], LOW BACK ACHE [   ],  SKIN : ULCERS [   ], RASH [   ], ITCHING [   ]  CNS: HEAD ACHE [   ], DOUBLE VISION [   ], BLURRED VISION [   ], AMS / CONFUSION [   ], SEIZURES [   ], WEAKNESS [   ],TINGLING / NUMBNESS [   ]    PHYSICAL EXAMINATION:    GENERAL APPEARANCE: NO DISTRESS  HEENT:  NO PALLOR, NO  JVD,  NO   NODES, NECK SUPPLE  CVS: S1 +, S2 +,   RS: AEEB,  OCCASIONAL  RALES +,  RONCHI +  ABD: SOFT, NT, NO, BS +  EXT: NO PE  SKIN: WARM,   SKELETAL:  ROM ACCEPTABLE  CNS:  AAO X 3        RADIOLOGY :    ACC: [id number] EXAM:  XR CHEST PORTABLE IMMED 1V                          PROCEDURE DATE:  03/25/2022          INTERPRETATION:  AP chest on March 25, 2022 at 2:02 AM. Patient was   intubated.    Heart magnified by technique.    Right lateral chest is excluded from the study.    Minimal infiltrate at right base somewhat increased from March 24.    At endotracheal tube and nasogastric tube in mid inserted.    IMPRESSION: Minimal right base infiltrate. Tubes inserted.      ASSESSMENT :       PLAN:  HPI:  61 year old man from [facility], active smoker, with a PMHx of CAD, CHF on nocturnal BIPAP, COPD (history of multiple intubation in past), peripheral neuropathy, GERD, HTN, HLD, obesity, polyarthritis, Pulmonary HTN, TIA, Vit D Def, and recent readmissionX2 to [facility] for respiratory failure requiring intubation ( 3/9 - 3/15, and ,most recent: 3/18-3/21), re-presented for acute on chronic respiratory failure with hypoxia and hypercapnia. History was limited to NH chart note review , coming in for weakness/AMS as per EMS. pt was not providing any further history. patient was placed on Bi-PAP in ED for ~2hours for acute on chronic hypoxic [id number] respiratory failure , with no improvement in mental status and blood gas , Patient intubated in ED on 3/25/2022 @~1am , right lip 23cm. pt received Bolus NS X2 for soft blood pressure s/p intubation. ICU consulted for close monitoring.      Of note : patient is known for being non adherent with his BiPAP      #  DC PLAN IN AM TO [facility] SINCE HE REQUIRES HIGHER LEVEL OF CARE WHICH [facility] IS UNABLE TO PROVIDE; EXTENSIVE DISCUSSION WITH PATIENT AND [facility]  REGARDING PATIENT'S CARE NEEDS. PATIENT EXPRESSED THAT HE ONLY WANTS TO LIVE IN A BUILDING WHERE HE WILL BE ALLOWED TO INTERMITTENTLY SMOKE CIGARETTES. HE EXPRESSED UNDERSTANDING THAT SMOKING COULD RESULT IN WORSENING RESPIRATORY DISEASE, MALIGNANCY AND EVEN DEATH. HE STATES HIS GOC IS FULL CODE AND UNDERSTANDS THE RISKS OF REQUIRING TRACHEOSTOMY IN THE FUTURE. CM TEAM WILL COORDINATE WITH PATIENT REGARDING LONG TERM PLACEMENT.  # DC WITH TAPERING DOSES OF PREDNISONE     # COPD EXACERBATION, ACUTE ON CHRONIC HYPOXIC & HYPERCAPNEIC RESPIRATORY FAILURE S/T NONCOMPLIANCE W/ TOBACCO CESSATION AND W/ BIPAP   - S/P MECHANICAL VENTILATION  - STARTED ON IV STEROIDS [TAPERING], SYMBICORT, SPIRIVA, AZITHROMYCIN, NEBS,  O2 SUPPLEMENTS, DALIRESP  - NICOTINE PATCH  - CRITICAL CARE EVALUATION IN PROGRESS    - COUNSELLED PATIENT AT LENGTH, DISCUSSED REGARDING NONCOMPLIANCE W/ SMOKING CESSATION. PATIENT IS HIGH RISK FOR RE-INTUBATION. D/W PATIENT THAT HE IS HIGH RISK FOR RESPIRATORY FAILURE. HE VERBALIZED UNDERSTANDING. HE EXPRESSED THAT HE WOULD ATTEMPT TO ABSTAIN FROM SMOKING AND UTILIZE NICOTINE PATCHES AND WOULD TRY TO REMAIN COMPLIANT WITH NOCTURNAL BIPAP    # CONSTIPATION , URINARY RETENTION - WILL DC AMOR, ADD MIRALAX AND SENNA, TAP WATER ENEMA X 1 TIME       # UNCONTROLLED DM, GLUCOSE INTOLERANCE DUE TO STEROIDS  -  LANTUS, SSI + FS     # SMOKER, COUNSELLED TO QUIT SMOKING     # GI AND DVT PROPHYLAXIS

## 2022-03-31 ENCOUNTER — TRANSCRIPTION ENCOUNTER (OUTPATIENT)
Age: 62
End: 2022-03-31

## 2022-03-31 VITALS
RESPIRATION RATE: 20 BRPM | HEART RATE: 97 BPM | OXYGEN SATURATION: 100 % | DIASTOLIC BLOOD PRESSURE: 60 MMHG | SYSTOLIC BLOOD PRESSURE: 96 MMHG

## 2022-03-31 PROCEDURE — 36415 COLL VENOUS BLD VENIPUNCTURE: CPT

## 2022-03-31 PROCEDURE — 94002 VENT MGMT INPAT INIT DAY: CPT

## 2022-03-31 PROCEDURE — 85025 COMPLETE CBC W/AUTO DIFF WBC: CPT

## 2022-03-31 PROCEDURE — 97116 GAIT TRAINING THERAPY: CPT

## 2022-03-31 PROCEDURE — 96375 TX/PRO/DX INJ NEW DRUG ADDON: CPT

## 2022-03-31 PROCEDURE — 85730 THROMBOPLASTIN TIME PARTIAL: CPT

## 2022-03-31 PROCEDURE — 87186 SC STD MICRODIL/AGAR DIL: CPT

## 2022-03-31 PROCEDURE — 94760 N-INVAS EAR/PLS OXIMETRY 1: CPT

## 2022-03-31 PROCEDURE — 92526 ORAL FUNCTION THERAPY: CPT

## 2022-03-31 PROCEDURE — 80048 BASIC METABOLIC PNL TOTAL CA: CPT

## 2022-03-31 PROCEDURE — 99285 EMERGENCY DEPT VISIT HI MDM: CPT

## 2022-03-31 PROCEDURE — 93005 ELECTROCARDIOGRAM TRACING: CPT

## 2022-03-31 PROCEDURE — 84484 ASSAY OF TROPONIN QUANT: CPT

## 2022-03-31 PROCEDURE — 82962 GLUCOSE BLOOD TEST: CPT

## 2022-03-31 PROCEDURE — 71045 X-RAY EXAM CHEST 1 VIEW: CPT

## 2022-03-31 PROCEDURE — 80053 COMPREHEN METABOLIC PANEL: CPT

## 2022-03-31 PROCEDURE — 87635 SARS-COV-2 COVID-19 AMP PRB: CPT

## 2022-03-31 PROCEDURE — 92610 EVALUATE SWALLOWING FUNCTION: CPT

## 2022-03-31 PROCEDURE — 84443 ASSAY THYROID STIM HORMONE: CPT

## 2022-03-31 PROCEDURE — 97530 THERAPEUTIC ACTIVITIES: CPT

## 2022-03-31 PROCEDURE — 83880 ASSAY OF NATRIURETIC PEPTIDE: CPT

## 2022-03-31 PROCEDURE — 85610 PROTHROMBIN TIME: CPT

## 2022-03-31 PROCEDURE — 97110 THERAPEUTIC EXERCISES: CPT

## 2022-03-31 PROCEDURE — 87640 STAPH A DNA AMP PROBE: CPT

## 2022-03-31 PROCEDURE — 87641 MR-STAPH DNA AMP PROBE: CPT

## 2022-03-31 PROCEDURE — 82803 BLOOD GASES ANY COMBINATION: CPT

## 2022-03-31 PROCEDURE — 87086 URINE CULTURE/COLONY COUNT: CPT

## 2022-03-31 PROCEDURE — 94640 AIRWAY INHALATION TREATMENT: CPT

## 2022-03-31 PROCEDURE — 83605 ASSAY OF LACTIC ACID: CPT

## 2022-03-31 PROCEDURE — 96374 THER/PROPH/DIAG INJ IV PUSH: CPT

## 2022-03-31 PROCEDURE — 81001 URINALYSIS AUTO W/SCOPE: CPT

## 2022-03-31 PROCEDURE — 84100 ASSAY OF PHOSPHORUS: CPT

## 2022-03-31 PROCEDURE — 94660 CPAP INITIATION&MGMT: CPT

## 2022-03-31 PROCEDURE — 83735 ASSAY OF MAGNESIUM: CPT

## 2022-03-31 PROCEDURE — 87040 BLOOD CULTURE FOR BACTERIA: CPT

## 2022-03-31 PROCEDURE — 97162 PT EVAL MOD COMPLEX 30 MIN: CPT

## 2022-03-31 RX ORDER — ALPRAZOLAM 0.25 MG
0.5 TABLET ORAL ONCE
Refills: 0 | Status: DISCONTINUED | OUTPATIENT
Start: 2022-03-31 | End: 2022-03-31

## 2022-03-31 RX ORDER — INSULIN GLARGINE 100 [IU]/ML
15 INJECTION, SOLUTION SUBCUTANEOUS
Qty: 0 | Refills: 0 | DISCHARGE

## 2022-03-31 RX ORDER — INSULIN LISPRO 100/ML
1 VIAL (ML) SUBCUTANEOUS
Qty: 1 | Refills: 0
Start: 2022-03-31 | End: 2022-04-29

## 2022-03-31 RX ORDER — INSULIN GLARGINE 100 [IU]/ML
12 INJECTION, SOLUTION SUBCUTANEOUS AT BEDTIME
Refills: 0 | Status: DISCONTINUED | OUTPATIENT
Start: 2022-03-31 | End: 2022-03-31

## 2022-03-31 RX ADMIN — ENOXAPARIN SODIUM 40 MILLIGRAM(S): 100 INJECTION SUBCUTANEOUS at 06:05

## 2022-03-31 RX ADMIN — OXYCODONE AND ACETAMINOPHEN 1 TABLET(S): 5; 325 TABLET ORAL at 06:11

## 2022-03-31 RX ADMIN — OXYCODONE AND ACETAMINOPHEN 1 TABLET(S): 5; 325 TABLET ORAL at 12:10

## 2022-03-31 RX ADMIN — Medication 8: at 12:07

## 2022-03-31 RX ADMIN — OXYCODONE AND ACETAMINOPHEN 1 TABLET(S): 5; 325 TABLET ORAL at 13:10

## 2022-03-31 RX ADMIN — GABAPENTIN 100 MILLIGRAM(S): 400 CAPSULE ORAL at 06:05

## 2022-03-31 RX ADMIN — BUDESONIDE AND FORMOTEROL FUMARATE DIHYDRATE 2 PUFF(S): 160; 4.5 AEROSOL RESPIRATORY (INHALATION) at 12:09

## 2022-03-31 RX ADMIN — TAMSULOSIN HYDROCHLORIDE 0.4 MILLIGRAM(S): 0.4 CAPSULE ORAL at 06:05

## 2022-03-31 RX ADMIN — Medication 4 UNIT(S): at 08:26

## 2022-03-31 RX ADMIN — ROFLUMILAST 250 MICROGRAM(S): 500 TABLET ORAL at 12:09

## 2022-03-31 RX ADMIN — Medication 6: at 08:27

## 2022-03-31 RX ADMIN — Medication 81 MILLIGRAM(S): at 12:08

## 2022-03-31 RX ADMIN — Medication 4 UNIT(S): at 12:08

## 2022-03-31 RX ADMIN — OXYCODONE AND ACETAMINOPHEN 1 TABLET(S): 5; 325 TABLET ORAL at 07:00

## 2022-03-31 RX ADMIN — Medication 0.5 MILLIGRAM(S): at 06:05

## 2022-03-31 NOTE — PROGRESS NOTE ADULT - PROBLEM SELECTOR PLAN 3
Elevated glucose in the setting of solumedrol and increased po intake  Continue morning Lantus and sliding scale coverage accordingly  Monitor glucose.
End stage GOLD 4D  Continue qHS  BIPAP IPAP 14/EPAP 6/FiO2 40%/Backup RR 14. O2 2-3L NC during the day   Continue bronchodilators and ICS  Completed course of steroids.   Daliresp starting on 3/28. Will need Daliresp 250mg daily for 30 days and then 500mg daily. Would benefit from outpatient PFTs and Pulmonary
+Hx PAH  Last echo Right ventricular pressure 53.  Continue statin and ASA
Increase lantus to 12U HS  Continue premeal and moderate sliding scale coverage.  Monitor glucose.
Continue 8U lantus nightly  4U admelog premeal  Continue sliding scale coverage.  Monitor glucose.
+Hx PAH  Last echo Right ventricular pressure 53.  Continue statin and ASA

## 2022-03-31 NOTE — PROGRESS NOTE ADULT - PROBLEM SELECTOR PLAN 1
Secondary to COPD and noncompliance with BIPAP.  Encourage to BIPAP nightly and as neded during the day.  Continue oxygen support when off BIPAP.  Continue bronchodilators and ICS  Steroids completed.  Continue Daliresp.  Would benfit from outpatient PFTs and Pulmonary f/u

## 2022-03-31 NOTE — PROGRESS NOTE ADULT - PROBLEM SELECTOR PROBLEM 5
CAD (coronary artery disease)
Type 2 diabetes mellitus without complication, with long-term current use of insulin
CAD (coronary artery disease)
Type 2 diabetes mellitus without complication, with long-term current use of insulin

## 2022-03-31 NOTE — PROGRESS NOTE ADULT - PROBLEM SELECTOR PROBLEM 3
folbic is no longer covered by Insurance. Is there an alternative?  
Pulmonary HTN
Type 2 diabetes mellitus without complication, with long-term current use of insulin
Type 2 diabetes mellitus without complication, with long-term current use of insulin
COPD (chronic obstructive pulmonary disease)
Type 2 diabetes mellitus without complication, with long-term current use of insulin
Pulmonary HTN

## 2022-03-31 NOTE — PROGRESS NOTE ADULT - REASON FOR ADMISSION
acute hypoxic hypercapnic respiratory failure

## 2022-03-31 NOTE — PROGRESS NOTE ADULT - PROBLEM SELECTOR PLAN 6
Continue oxygen during the day and AVAPs at night.  Has a Trilogy and oxygen at [facility].
Normotensive  Not on any anti-HTN regimen at home  Continue ASA, statin
Continue oxygen during the day and AVAPs at night.  Has a Trilogy and oxygen at [facility].
Continue oxygen during the day and AVAPs at night.  Has a Trilogy and oxygen at [facility].
Currently normotension.  Does not require medications  Continue statin
Currently normotension.  Does not require medications  Continue statin

## 2022-03-31 NOTE — DISCHARGE NOTE NURSING/CASE MANAGEMENT/SOCIAL WORK - NSDCPEFALRISK_GEN_ALL_CORE
For information on Fall & Injury Prevention, visit: [email or web address] OR  [email or web address] OR  https://www.cdc.gov/steadi/patient.html

## 2022-03-31 NOTE — PROGRESS NOTE ADULT - PROBLEM SELECTOR PLAN 8
Does not consistently use Trilogy at [facility].  Explained the importance of using Trilogy as ordered.  Continues to smoke at facility.
Continue Xanax, melatonin   Supportive measures
Does not consistently use Trilogy at [facility].  Explained the importance of using Trilogy as ordered.  Continues to smoke at facility.
Does not consistently use Trilogy at [facility].  Explained the importance of using Trilogy as ordered.  Continues to smoke at facility.
Has oxygen and Trilogy at [facility].  Will contact [facility] to check Trilogy machine.
Has oxygen and Trilogy at [facility].  Will contact [facility] to check Trilogy machine.

## 2022-03-31 NOTE — PROGRESS NOTE ADULT - NS ATTEND BILL GEN_ALL_CORE
Attending to bill

## 2022-03-31 NOTE — PROGRESS NOTE ADULT - PROBLEM SELECTOR PROBLEM 9
Smoker
Noncompliance with treatment
Need for prophylactic measure
Smoker
Smoker
Noncompliance with treatment

## 2022-03-31 NOTE — PROGRESS NOTE ADULT - PROBLEM SELECTOR PLAN 10
DVT and GI prophylaxis.  BIPAP nightly and as needed during the day.  Continue oxygen support when not using Trilogy.  Noncompliant with Trilogy at [facility].  Needs to follow with Pulmonary as an outpatient.  High risk for reintubation and death secondary to noncompliance.  Daliresp 250 mcg daily to start on 3/28.  Medically stable for discharge.

## 2022-03-31 NOTE — PROGRESS NOTE ADULT - PROBLEM SELECTOR PLAN 2
End stage COPD. GOLD 4D  Continue oxygen support.  Encourage BIPAP usage at night.  Continue bronchodilators and ICS  Steroids completed  Continue Daaliresp.

## 2022-03-31 NOTE — PROGRESS NOTE ADULT - NS ATTEND AMEND GEN_ALL_CORE FT
61 year old man from [facility], active smoker, with a PMHx of CAD, CHF on nocturnal BIPAP, COPD (history of multiple intubation in past), peripheral neuropathy, GERD, HTN, HLD, obesity, polyarthritis, Pulmonary HTN, TIA, Vit D Def, and recent admission for COPD exacerbation, re-presented for acute on chronic respiratory failure with hypoxia and hypercapnia. Failed BiPAP in ED and was subsequently intubated and transferred to ICU.     Assessment:  - Acute hypoxic hypercapnic respiratory failure   - COPD exacerbation    - Respiratory acidosis   - HTN  - HLD  - CAD   - DM type 2  - GERD  - Pulmonary HTN          Plan   - Adjust sedation for SBT / SAT   - Pat was extubated to BiPaP   - Pat had multiple intubation due to COPD   - Will need trach in future   - Continue Steroid  - Symbicort
Discharge today
Needs to be placed in [facility] prior to return to [facility]  Refusing discharge to [facility], will continue to work with patient regarding discharge  Placed on bipap as reported with having bipap at [facility], likely decompensation due to non compliance   Discharge planning
Needs to be placed in [facility] prior to return to [facility]  Discharge planning
Problem/Plan - 1:  ·  Problem: Acute on chronic respiratory failure with hypoxia and hypercapnia.   ·  Plan: Readmitted for hypercapnic respiratory failure. PCO2 greater than 120. Started on BIPAP therapy without improvement. Patient intubated in ED. Extubated 3/25.  Start AVAPS at night and as needed during the day. Oxygen 2LPM when not on AVAPS.  Has Trilogy and oxygen at [facility]. Will have [facility] check equipment at [facility].  End stage COPD. GOLD 4D  Continue bronchodilators and ICS  Decrease solumedrol to every 12 hours.  Recommend starting Daliresp. Would benefit from outpatient PFTs and Pulmonary.     Problem/Plan - 2:  ·  Problem: COPD (chronic obstructive pulmonary disease).   ·  Plan: End stage GOLD 4D  Continue oxygen support. AVAPS nightly and as needed during the day.  Has Trilogy and oxygen at [facility]  Continue bronchodilators and ICS  Solumedrol 40mg every 12 hours.  Rec starting daliresp 250mg daily for 1 month followed by daliresp 500mg daily.     Problem/Plan - 3:  ·  Problem: Pulmonary HTN.   ·  Plan: +Hx PAH  Last echo Right ventricular pressure 53.  Continue statin and ASA.     Problem/Plan - 4:  ·  Problem: CAD (coronary artery disease).   ·  Plan: Continue current medications.     Problem/Plan - 5:  ·  Problem: Type 2 diabetes mellitus without complication, with long-term current use of insulin.   ·  Plan: Continue morning lantus and s;iding scale coverage.  Monitor glucose.     Problem/Plan - 6:  ·  Problem: HTN (hypertension).   ·  Plan: Currently normotension.  Does not require medications  Continue statin.     Problem/Plan - 7:  ·  Problem: Smoker.   ·  Plan: Active smoker.  Continue nicotine patch.  Counseled on smoking cessation.
Discharge today   Cont. NIV support as tolerated
Problem/Plan - 1:  ·  Problem: Acute on chronic respiratory failure with hypoxia and hypercapnia.   ·  Plan: Readmitted for hypercapnic respiratory failure. PCO2 greater than 120. Started on BIPAP therapy without improvement. Patient intubated in ED. Extubated 3/25.  Start AVAPS at night and as needed during the day. Oxygen 2LPM when not on AVAPS.  Has Trilogy and oxygen at [facility]. Will have [facility] check equipment at [facility].  End stage COPD. GOLD 4D  Continue bronchodilators and ICS  Solumedrol every 12 hours to complete today.  Daliresp starting on 3/28. Would benefit from outpatient PFTs and Pulmonary.     Problem/Plan - 2:  ·  Problem: COPD (chronic obstructive pulmonary disease).   ·  Plan: End stage GOLD 4D  Continue oxygen support. AVAPS nightly and as needed during the day.  Has Trilogy and oxygen at [facility]  Continue bronchodilators and ICS  Solumedrol 40mg every 12 hours.  Daliresp 250mcg daily to begin on 3/28 for 1 month and to continue Daliresp 500mg daily if tolerating well.     Problem/Plan - 3:  ·  Problem: Pulmonary HTN.   ·  Plan: +Hx PAH  Last echo Right ventricular pressure 53.  Continue statin and ASA.     Problem/Plan - 4:  ·  Problem: CAD (coronary artery disease).   ·  Plan: Continue current medications.     Problem/Plan - 5:  ·  Problem: Type 2 diabetes mellitus without complication, with long-term current use of insulin.   ·  Plan: Elevated glucose in the setting of solumedrol and increased po intake  Continue morning Lantus and sliding scale coverage accordingly  Monitor glucose.     Problem/Plan - 6:  ·  Problem: HTN (hypertension).   ·  Plan: Currently normotension.  Does not require medications  Continue statin.     Problem/Plan - 7:  ·  Problem: Smoker.   ·  Plan: Active smoker.  Continue nicotine patch.  Counseled on smoking cessation.

## 2022-03-31 NOTE — PROGRESS NOTE ADULT - PROBLEM SELECTOR PROBLEM 4
CAD (coronary artery disease)
CAD (coronary artery disease)
Pulmonary HTN
Type 2 diabetes mellitus without complication, with long-term current use of insulin
Pulmonary HTN
Pulmonary HTN

## 2022-03-31 NOTE — PROGRESS NOTE ADULT - PROBLEM SELECTOR PLAN 4
Hx PAH  Last echo Right ventricular pressure 53.  Continue statin and ASA.
Uncontrolled hyperglycemia  Lantus increased to 10 units qHS  Corrective scale Admelog increased to moderate sliding scale  Monitor BG ac/HS  Goal 140-180  Hypoglycemia protocol
Hx PAH  Last echo Right ventricular pressure 53.  Continue statin and ASA.
Hx PAH  Last echo Right ventricular pressure 53.  Continue statin and ASA.
Continue current medications.
Continue current medications.

## 2022-03-31 NOTE — PROGRESS NOTE ADULT - PROBLEM SELECTOR PROBLEM 6
Oxygen dependent
HTN (hypertension)
Oxygen dependent
HTN (hypertension)
Oxygen dependent
HTN (hypertension)

## 2022-03-31 NOTE — PROGRESS NOTE ADULT - PROBLEM SELECTOR PROBLEM 1
Acute on chronic respiratory failure with hypoxia and hypercapnia

## 2022-03-31 NOTE — PROGRESS NOTE ADULT - ASSESSMENT
61 year old man from [facility], active smoker, with a PMHx of CAD, CHF on nocturnal BIPAP, COPD ( MULTIPLE intubations in past), peripheral neuropathy, GERD, HTN, HLD, obesity, polyarthritis, Pulmonary HTN, TIA, Vit D Def, and recent admission to [facility] for respiratory failure requiring intubation (3/9 - 15 and again on 3/18 - 21), who re-presented to [facility] on 3/24 for acute on chronic respiratory failure with hypoxia and hypercapnia. Has Trilogy and oxygen at [facility]. In the ED he was obtunded, with CO2 greater than 125. Tried on BiPAP without improvement, and subsequently intubated and transferred to ICU. In ICU he was re-started on COPD medications, quickly weaned off ventilator and extubated. Consults placed with PT and social work. Collateral information obtained from assisted living, current episode likely triggered by smoking and/or non-adherence to medications. [place] removed 3/25. Bladder scan at 10pm shows 450cc retention. [place] cath reinserted.  3/25 Transferred from ICU to SCU.    Daliresp ordered to begin on 3/28 in hope of better COPD control in this pt w/ multiple readmissions and intubations.

## 2022-03-31 NOTE — PROGRESS NOTE ADULT - SUBJECTIVE AND OBJECTIVE BOX
Patient is a 61y old  Male who presents with a chief complaint of acute hypoxic hypercapnic respiratory failure (31 Mar 2022 08:24)    PATIENT IS SEEN AND EXAMINED IN MEDICAL FLOOR.  JAMILA [    ]    MT [   ]      GT [   ]    ALLERGIES:  No Known Allergies      Daily     Daily     VITALS:    Vital Signs Last 24 Hrs  T(C): 36.1 (31 Mar 2022 04:35), Max: 36.1 (30 Mar 2022 14:00)  T(F): 97 (31 Mar 2022 04:35), Max: 97 (30 Mar 2022 14:00)  HR: 80 (31 Mar 2022 04:35) (80 - 95)  BP: 116/64 (31 Mar 2022 04:35) (110/59 - 116/64)  BP(mean): --  RR: 16 (31 Mar 2022 04:35) (16 - 18)  SpO2: 99% (31 Mar 2022 04:35) (97% - 99%)    LABS:              CAPILLARY BLOOD GLUCOSE      POCT Blood Glucose.: 342 mg/dL (31 Mar 2022 11:29)  POCT Blood Glucose.: 285 mg/dL (31 Mar 2022 07:53)  POCT Blood Glucose.: 231 mg/dL (30 Mar 2022 21:16)  POCT Blood Glucose.: 322 mg/dL (30 Mar 2022 17:22)          Creatinine Trend: 0.62<--, 0.65<--, 0.57<--, 0.59<--, 0.71<--, 0.71<--  I&O's Summary    30 Mar 2022 07:01  -  31 Mar 2022 07:00  --------------------------------------------------------  IN: 0 mL / OUT: 1850 mL / NET: -1850 mL    31 Mar 2022 07:01  -  31 Mar 2022 11:39  --------------------------------------------------------  IN: 0 mL / OUT: 450 mL / NET: -450 mL            Clean Catch Clean Catch (Midstream)  03-25 @ 10:24   >100,000 CFU/ml Enterococcus faecalis  --  Enterococcus faecalis      .Blood Blood-Peripheral  03-25 @ 03:30   No Growth Final  --  --          MEDICATIONS:    MEDICATIONS  (STANDING):  ALBUTerol    90 MICROgram(s) HFA Inhaler 2 Puff(s) Inhalation every 6 hours  ALPRAZolam 0.5 milliGRAM(s) Oral every 12 hours  aspirin  chewable 81 milliGRAM(s) Oral daily  atorvastatin 40 milliGRAM(s) Oral at bedtime  budesonide 160 MICROgram(s)/formoterol 4.5 MICROgram(s) Inhaler 2 Puff(s) Inhalation two times a day  enoxaparin Injectable 40 milliGRAM(s) SubCutaneous every 24 hours  finasteride 5 milliGRAM(s) Oral daily  gabapentin 100 milliGRAM(s) Oral three times a day  glucagon  Injectable 1 milliGRAM(s) IntraMuscular once  insulin glargine Injectable (LANTUS) 12 Unit(s) SubCutaneous at bedtime  insulin lispro (ADMELOG) corrective regimen sliding scale   SubCutaneous three times a day before meals  insulin lispro (ADMELOG) corrective regimen sliding scale   SubCutaneous at bedtime  insulin lispro Injectable (ADMELOG) 4 Unit(s) SubCutaneous three times a day before meals  melatonin 5 milliGRAM(s) Oral at bedtime  nicotine - 21 mG/24Hr(s) Patch 1 patch Transdermal daily  pantoprazole  Injectable 40 milliGRAM(s) IV Push at bedtime  polyethylene glycol 3350 17 Gram(s) Oral daily  roflumilast 250 MICROGram(s) Oral daily  senna 2 Tablet(s) Oral at bedtime  tamsulosin 0.4 milliGRAM(s) Oral two times a day  tiotropium 18 MICROgram(s) Capsule 1 Capsule(s) Inhalation daily      MEDICATIONS  (PRN):  bisacodyl Suppository 10 milliGRAM(s) Rectal daily PRN Constipation  oxycodone    5 mG/acetaminophen 325 mG 1 Tablet(s) Oral every 6 hours PRN Severe Pain (7 - 10)      REVIEW OF SYSTEMS:                           ALL ROS DONE [ X   ]    CONSTITUTIONAL:  LETHARGIC [   ], FEVER [   ], UNRESPONSIVE [   ]  CVS:  CP  [   ], SOB, [   ], PALPITATIONS [   ], DIZZYNESS [   ]  RS: COUGH [   ], SPUTUM [   ]  GI: ABDOMINAL PAIN [   ], NAUSEA [   ], VOMITINGS [   ], DIARRHEA [   ], CONSTIPATION [   ]  :  DYSURIA [   ], NOCTURIA [   ], INCREASED FREQUENCY [   ], DRIBLING [   ],  SKELETAL: PAINFUL JOINTS [   ], SWOLLEN JOINTS [   ], NECK ACHE [   ], LOW BACK ACHE [   ],  SKIN : ULCERS [   ], RASH [   ], ITCHING [   ]  CNS: HEAD ACHE [   ], DOUBLE VISION [   ], BLURRED VISION [   ], AMS / CONFUSION [   ], SEIZURES [   ], WEAKNESS [   ],TINGLING / NUMBNESS [   ]    PHYSICAL EXAMINATION:    GENERAL APPEARANCE: NO DISTRESS  HEENT:  NO PALLOR, NO  JVD,  NO   NODES, NECK SUPPLE  CVS: S1 +, S2 +,   RS: AEEB,  OCCASIONAL  RALES +,  RONCHI +  ABD: SOFT, NT, NO, BS +  EXT: NO PE  SKIN: WARM,   SKELETAL:  ROM ACCEPTABLE  CNS:  AAO X 3        RADIOLOGY :    ACC: [id number] EXAM:  XR CHEST PORTABLE IMMED 1V                          PROCEDURE DATE:  03/25/2022          INTERPRETATION:  AP chest on March 25, 2022 at 2:02 AM. Patient was   intubated.    Heart magnified by technique.    Right lateral chest is excluded from the study.    Minimal infiltrate at right base somewhat increased from March 24.    At endotracheal tube and nasogastric tube in mid inserted.    IMPRESSION: Minimal right base infiltrate. Tubes inserted.      ASSESSMENT :       PLAN:  HPI:  61 year old man from [facility], active smoker, with a PMHx of CAD, CHF on nocturnal BIPAP, COPD (history of multiple intubation in past), peripheral neuropathy, GERD, HTN, HLD, obesity, polyarthritis, Pulmonary HTN, TIA, Vit D Def, and [facility] ( 3/9 - 3/15, and ,most recent: 3/18-3/21), re-presented for acute on chronic respiratory failure with hypoxia and hypercapnia. History was limited to NH chart note review , coming in for weakness/AMS as per EMS. pt was not providing any further history. patient was placed on Bi-PAP in ED for ~2hours for acute on chronic hypoxic [id number] respiratory failure , with no improvement in mental status and blood gas , Patient intubated in ED on 3/25/2022 @~1am , right lip 23cm. pt received Bolus NS X2 for soft blood pressure s/p intubation. ICU consulted for close monitoring.      Of note : patient is known for being non adherent with his BiPAP      #  DC PLAN IN AM TO [facility] SINCE HE REQUIRES HIGHER LEVEL OF CARE WHICH [facility] ASSISTED [facility] IS UNABLE TO PROVIDE; EXTENSIVE DISCUSSION WITH PATIENT AND [facility]  REGARDING PATIENT'S CARE NEEDS. PATIENT EXPRESSED THAT HE ONLY WANTS TO LIVE IN A BUILDING WHERE HE WILL BE ALLOWED TO INTERMITTENTLY SMOKE CIGARETTES. HE EXPRESSED UNDERSTANDING THAT SMOKING COULD RESULT IN WORSENING RESPIRATORY DISEASE, MALIGNANCY AND EVEN DEATH. HE STATES HIS GOC IS FULL CODE AND UNDERSTANDS THE RISKS OF REQUIRING TRACHEOSTOMY IN THE FUTURE. CM TEAM WILL COORDINATE WITH PATIENT REGARDING LONG TERM PLACEMENT.  # DC WITH TAPERING DOSES OF PREDNISONE     # COPD EXACERBATION, ACUTE ON CHRONIC HYPOXIC & HYPERCAPNEIC RESPIRATORY FAILURE S/T NONCOMPLIANCE W/ TOBACCO CESSATION AND W/ BIPAP   - S/P MECHANICAL VENTILATION  - STARTED ON IV STEROIDS [TAPERING], SYMBICORT, SPIRIVA, AZITHROMYCIN, NEBS,  O2 SUPPLEMENTS, DALIRESP  - NICOTINE PATCH  - CRITICAL CARE EVALUATION IN PROGRESS    - COUNSELLED PATIENT AT LENGTH, DISCUSSED REGARDING NONCOMPLIANCE W/ SMOKING CESSATION. PATIENT IS HIGH RISK FOR RE-INTUBATION. D/W PATIENT THAT HE IS HIGH RISK FOR RESPIRATORY FAILURE. HE VERBALIZED UNDERSTANDING. HE EXPRESSED THAT HE WOULD ATTEMPT TO ABSTAIN FROM SMOKING AND UTILIZE NICOTINE PATCHES AND WOULD TRY TO REMAIN COMPLIANT WITH NOCTURNAL BIPAP    # CONSTIPATION , URINARY RETENTION - WILL DC AMOR, ADD MIRALAX AND SENNA, TAP WATER ENEMA X 1 TIME       # UNCONTROLLED DM, GLUCOSE INTOLERANCE DUE TO STEROIDS  -  LANTUS, SSI + FS     # SMOKER, COUNSELLED TO QUIT SMOKING     # GI AND DVT PROPHYLAXIS

## 2022-03-31 NOTE — PROGRESS NOTE ADULT - SUBJECTIVE AND OBJECTIVE BOX
DEVIKA CARBALLO    SCU progress note    INTERVAL HPI/OVERNIGHT EVENTS: ***Refused AVAPS last night.    DNR [ ]   DNI  [  ]   FULL CODE    Covid - 19 PCR: Negative 3/28    The 4Ms    What Matters Most: see GOC  Age appropriate Medications/Screen for High Risk Medication: Yes  Mentation: see CAM below  Mobility: defer to physical exam    The Confusion Assessment Method (CAM) Diagnostic Algorithm     1: Acute Onset or Fluctuating Course  - Is there evidence of an acute change in mental status from the patient’s baseline? Did the (abnormal) behavior  fluctuate during the day, that is, tend to come and go, or increase and decrease in severity?  [ ] YES [x ] NO     2: Inattention  - Did the patient have difficulty focusing attention, being easily distractible, or having difficulty keeping track of what was being said?  [ ] YES [x ] NO     3: Disorganized thinking  -Was the patient’s thinking disorganized or incoherent, such as rambling or irrelevant conversation, unclear or illogical flow of ideas, or unpredictable switching from subject to subject?  [ ] YES [x ] NO    4: Altered Level of consciousness?  [ ] YES [x ] NO    The diagnosis of delirium by CAM requires the presence of features 1 and 2 and either 3 or 4.    PRESSORS: [ ] YES [ ] NO    Cardiovascular:  Heart Failure  Acute   Acute on Chronic  Chronic       tamsulosin 0.4 milliGRAM(s) Oral two times a day    Pulmonary:  ALBUTerol    90 MICROgram(s) HFA Inhaler 2 Puff(s) Inhalation every 6 hours  budesonide 160 MICROgram(s)/formoterol 4.5 MICROgram(s) Inhaler 2 Puff(s) Inhalation two times a day  roflumilast 250 MICROGram(s) Oral daily  tiotropium 18 MICROgram(s) Capsule 1 Capsule(s) Inhalation daily    Hematalogic:  aspirin  chewable 81 milliGRAM(s) Oral daily  enoxaparin Injectable 40 milliGRAM(s) SubCutaneous every 24 hours    Other:  ALPRAZolam 0.5 milliGRAM(s) Oral every 12 hours  atorvastatin 40 milliGRAM(s) Oral at bedtime  bisacodyl Suppository 10 milliGRAM(s) Rectal daily PRN  finasteride 5 milliGRAM(s) Oral daily  gabapentin 100 milliGRAM(s) Oral three times a day  glucagon  Injectable 1 milliGRAM(s) IntraMuscular once  insulin glargine Injectable (LANTUS) 12 Unit(s) SubCutaneous at bedtime  insulin lispro (ADMELOG) corrective regimen sliding scale   SubCutaneous three times a day before meals  insulin lispro (ADMELOG) corrective regimen sliding scale   SubCutaneous at bedtime  insulin lispro Injectable (ADMELOG) 4 Unit(s) SubCutaneous three times a day before meals  melatonin 5 milliGRAM(s) Oral at bedtime  nicotine - 21 mG/24Hr(s) Patch 1 patch Transdermal daily  oxycodone    5 mG/acetaminophen 325 mG 1 Tablet(s) Oral every 6 hours PRN  pantoprazole  Injectable 40 milliGRAM(s) IV Push at bedtime  polyethylene glycol 3350 17 Gram(s) Oral daily  senna 2 Tablet(s) Oral at bedtime    ALBUTerol    90 MICROgram(s) HFA Inhaler 2 Puff(s) Inhalation every 6 hours  ALPRAZolam 0.5 milliGRAM(s) Oral every 12 hours  aspirin  chewable 81 milliGRAM(s) Oral daily  atorvastatin 40 milliGRAM(s) Oral at bedtime  bisacodyl Suppository 10 milliGRAM(s) Rectal daily PRN  budesonide 160 MICROgram(s)/formoterol 4.5 MICROgram(s) Inhaler 2 Puff(s) Inhalation two times a day  enoxaparin Injectable 40 milliGRAM(s) SubCutaneous every 24 hours  finasteride 5 milliGRAM(s) Oral daily  gabapentin 100 milliGRAM(s) Oral three times a day  glucagon  Injectable 1 milliGRAM(s) IntraMuscular once  insulin glargine Injectable (LANTUS) 12 Unit(s) SubCutaneous at bedtime  insulin lispro (ADMELOG) corrective regimen sliding scale   SubCutaneous three times a day before meals  insulin lispro (ADMELOG) corrective regimen sliding scale   SubCutaneous at bedtime  insulin lispro Injectable (ADMELOG) 4 Unit(s) SubCutaneous three times a day before meals  melatonin 5 milliGRAM(s) Oral at bedtime  nicotine - 21 mG/24Hr(s) Patch 1 patch Transdermal daily  oxycodone    5 mG/acetaminophen 325 mG 1 Tablet(s) Oral every 6 hours PRN  pantoprazole  Injectable 40 milliGRAM(s) IV Push at bedtime  polyethylene glycol 3350 17 Gram(s) Oral daily  roflumilast 250 MICROGram(s) Oral daily  senna 2 Tablet(s) Oral at bedtime  tamsulosin 0.4 milliGRAM(s) Oral two times a day  tiotropium 18 MICROgram(s) Capsule 1 Capsule(s) Inhalation daily    Drug Dosing Weight  Height (cm): 172.7 (24 Mar 2022 22:42)  Weight (kg): 93.043 (26 Mar 2022 07:08)  BMI (kg/m2): 31.2 (26 Mar 2022 07:08)  BSA (m2): 2.07 (26 Mar 2022 07:08)    CENTRAL LINE: [ ] YES [x ] NO  LOCATION:   DATE INSERTED:  REMOVE: [ ] YES [ ] NO  EXPLAIN:    AMOR: [ ] YES [x ] NO    DATE INSERTED:  REMOVE:  [ ] YES [ ] NO  EXPLAIN:    PAST MEDICAL & SURGICAL HISTORY:  COPD (chronic obstructive pulmonary disease)  Oxygen 2-3L at home    Stented coronary artery  2017    HLD (hyperlipidemia)    Pulmonary HTN    Type 2 diabetes mellitus without complication, with long-term current use of insulin    Coronary artery disease involving native coronary artery of native heart without angina pectoris    Smoker    Gastroesophageal reflux disease, esophagitis presence not specified    Oxygen dependent    DM (diabetes mellitus)    CAD (coronary artery disease)    GERD (gastroesophageal reflux disease)    Insomnia    CHF (congestive heart failure)    HTN (hypertension)    Mood disorder    No significant past surgical history                03-30 @ 07:01  -  03-31 @ 07:00  --------------------------------------------------------  IN: 0 mL / OUT: 1850 mL / NET: -1850 mL            PHYSICAL EXAM:    GENERAL: NAD, obese  HEAD:  Atraumatic, Normocephalic  EYES: EOMI, PERRLA, conjunctiva and sclera clear  ENMT: No tonsillar erythema, exudates  NECK: Supple, No JVD  NERVOUS SYSTEM:  Alert & Oriented X3, Follows commands, moving all extremities.  CHEST/LUNG: Diminished breath sounds bilaterally. No wheeze or crackles noted  HEART: Regular rate and rhythm; No murmurs, rubs, or gallops  ABDOMEN: Soft, Obese, Nontender, Nondistended; Bowel sounds present  EXTREMITIES:  2+ Peripheral Pulses, No clubbing, cyanosis, or edema  LYMPH: No lymphadenopathy noted  SKIN: Stage 1 pressure injury  bilat ischium          LABS:                    [  ]  DVT Prophylaxis  [  ]  Nutrition, Brand, Rate         Goal Rate        Abnormal Nutritional Status -  Malnutrition   Cachexia         RADIOLOGY & ADDITIONAL STUDIES:  ***  < from: Xray Chest 1 View-PORTABLE IMMEDIATE (Xray Chest 1 View-PORTABLE IMMEDIATE .) (03.25.22 @ 02:55) >  Heart magnified by technique.    Right lateral chest is excluded from the study.    Minimal infiltrate at right base somewhat increased from March 24.    At endotracheal tube and nasogastric tube in mid inserted.    IMPRESSION: Minimal right base infiltrate. Tubes inserted.    < end of copied text >    Goals of Care Discussion with Family/Proxy/Other   - see note from 3/20/22

## 2022-03-31 NOTE — PROGRESS NOTE ADULT - PROBLEM SELECTOR PROBLEM 8
Noncompliance with treatment
Oxygen dependent
Mood disorder
Noncompliance with treatment
Noncompliance with treatment
Oxygen dependent

## 2022-03-31 NOTE — PROGRESS NOTE ADULT - PROVIDER SPECIALTY LIST ADULT
Critical Care
Internal Medicine
Critical Care
Internal Medicine
Internal Medicine
Critical Care

## 2022-03-31 NOTE — PROGRESS NOTE ADULT - PROBLEM SELECTOR PLAN 7
Currently normotensive.  Does not require medications  Continue statin.
Currently normotensive.  Does not require medications  Continue statin.
Currently normotensive.  Does not require medications  Continue statin
Active smoker.  Continue nicotine patch.  Counseled on smoking cessation.
Continue nicotine patch  Smoke cessation
Active smoker.  Continue nicotine patch.  Counseled on smoking cessation.

## 2022-03-31 NOTE — PROGRESS NOTE ADULT - NS ATTEND OPT1 GEN_ALL_CORE
I attest my time as attending is greater than 50% of the total combined time spent on qualifying patient care activities by the PA/NP and attending.

## 2022-03-31 NOTE — PROGRESS NOTE ADULT - PROBLEM SELECTOR PLAN 9
Continue nicotine patch.  Smoking cessation counseling.  Wants to continue nicotine patch at Rehab.
DVT ppx: Lovenox   GI ppx: PPI
Continue nicotine patch.  Smoking cessation counseling.  Wants to continue nicotine patch at Rehab.
Continue nicotine patch.  Smoking cessation counseling.  Wants to continue nicotine patch at Rehab.
Does not consistently use Trilogy at [facility].  Explained the importance of using Trilogy as ordered
Does not consistently use Trilogy at [facility].  Explained the importance of using Trilogy as ordered

## 2022-03-31 NOTE — DISCHARGE NOTE NURSING/CASE MANAGEMENT/SOCIAL WORK - PATIENT PORTAL LINK FT
You can access the FollowMyHealth Patient Portal offered by [facility] by registering at the following website: [email or web address]. By joining [facility]’s FollowMyHealth portal, you will also be able to view your health information using other applications (apps) compatible with our system.

## 2022-03-31 NOTE — PROGRESS NOTE ADULT - NS_MD_PANP_GEN_ALL_CORE

## 2022-03-31 NOTE — PROGRESS NOTE ADULT - PROBLEM SELECTOR PROBLEM 2
COPD (chronic obstructive pulmonary disease)
Pulmonary HTN
COPD (chronic obstructive pulmonary disease)
COPD (chronic obstructive pulmonary disease)

## 2022-04-29 RX ORDER — ROFLUMILAST 500 UG/1
1 TABLET ORAL
Qty: 30 | Refills: 0
Start: 2022-04-29 | End: 2022-05-28

## 2022-05-10 NOTE — PATIENT PROFILE ADULT - NSPROHMDIABETBLDGLCTARGET_GEN_A_NUR
Suturegard Intro: Intraoperative tissue expansion was performed, utilizing the SUTUREGARD device, in order to reduce wound tension. unknown

## 2022-05-12 NOTE — DIETITIAN INITIAL EVALUATION ADULT. - PROBLEM SELECTOR PLAN 6
show
HISS  cont lantus bid- confirm with pharmacy  10 units lantus now for blood sugar >300 on solumedrol and PO diet with additional 10 humalog coverage

## 2022-05-14 NOTE — ED ADULT NURSE REASSESSMENT NOTE - NS ED NURSE REASSESS COMMENT FT1
2000 cc yellow urine drained from Indwelling Tejada catheter.
Pt bladder scanned for urine, over 1000cc observed. Indwelling mark catheter placed per ED MDs orders. Other RN present, sterility maintained. 300cc yellow urine drained, UA and UC obtained and sent to lab. Will continue to monitor.
Pt sleeping in bed, VSS. Will continue to monitor.
Pt wants bipap machine off and is c/o discomfort. ED MDs aware and at bedside. Pt taken off bipap machine and placed on 4L NC O2. No noted tachypnea or labored breathing. Pt sat-ing at 100% on 4L NC O2. Will continue to monitor.
Received report from Hari JENNINGS. Pt on bipap and sat-ing at 100%. No c/o SOB or CP at this time. Will continue to monitor.
No

## 2022-06-15 NOTE — DIETITIAN INITIAL EVALUATION ADULT. - FLUIDS
PLAN:      Problem List Items Addressed This Visit     Lymphedema of right lower extremity (Chronic)     Patient has been referred multiple times to lymphedema clinic and vascular.  Patient works away from home and is difficult to get to the appointments.  Patient does use a pump at home.  Strongly advised to follow-up with vascular and lymphedema clinic.  Patient would also benefit from bariatric surgery.           Relevant Medications    hydrocortisone 2.5 % cream    gabapentin (NEURONTIN) 300 MG capsule    Other Relevant Orders    Ambulatory referral/consult to Vascular Medicine    Ambulatory referral/consult to Bariatric Surgery    Hypertension, essential (Chronic)     Counseled on importance of hypertension disease course, I recommend ongoing Education for DASH-diet and exercise.  Counseled on medication regimen importance of treating high blood pressure.  Please be advised of risk of untreated blood pressure as discussed.  Please advised of ER precautions were given for symptoms of hypertensive urgency and emergency.             Relevant Medications    spironolactone (ALDACTONE) 100 MG tablet    gabapentin (NEURONTIN) 300 MG capsule    Other Relevant Orders    Ambulatory referral/consult to Bariatric Surgery    Encounter for long-term (current) use of medications (Chronic)     Complete history and physical was completed today.  Complete and thorough medication reconciliation was performed.  Discussed risks and benefits of medications.  Advised patient on orders and health maintenance.  We discussed old records and old labs if available.  Will request any records not available through epic.  Continue current medications listed on your summary sheet.             Relevant Medications    spironolactone (ALDACTONE) 100 MG tablet    Other Relevant Orders    Ambulatory referral/consult to Bariatric Surgery    Chronic pain of lower extremity - Primary (Chronic)     Adding gabapentin 300 milligrams twice during the  daytime.  Continue 900 milligrams at bedtime.  Adding a short-term tramadol for severe pain relief.    Patient to seek bariatric surgery in hopes that this will help with his severe lymphedema.  Continue pump at home.  Follow-up with vascular lymphedema specialist and lymphedema clinic.           Relevant Medications    traMADoL (ULTRAM) 50 mg tablet    gabapentin (NEURONTIN) 300 MG capsule    Other Relevant Orders    Ambulatory referral/consult to Vascular Medicine    Ambulatory referral/consult to Bariatric Surgery    Lymphedema    Relevant Medications    traMADoL (ULTRAM) 50 mg tablet    Other Relevant Orders    Ambulatory referral/consult to Vascular Medicine    Ambulatory referral/consult to Bariatric Surgery    Class 3 obesity with alveolar hypoventilation, serious comorbidity, and body mass index (BMI) of 60.0 to 69.9 in adult     Referral to bariatric surgery.  Discussed lifestyle modification with diet and exercise.           Relevant Orders    Ambulatory referral/consult to Bariatric Surgery        Future Appointments     Date Provider Specialty Appt Notes    9/15/2022 Rj De Guzman MD Family Medicine 3 month f/u    11/15/2022 Rj De Guzman MD Family Medicine annual         Medication Management for assessment above:   Medication List with Changes/Refills   New Medications    GABAPENTIN (NEURONTIN) 300 MG CAPSULE    Take 1 capsule (300 mg total) by mouth 2 (two) times daily. In addition to 900mg at night    TRAMADOL (ULTRAM) 50 MG TABLET    Take 1 tablet (50 mg total) by mouth every 6 (six) hours as needed for Pain.   Current Medications    GABAPENTIN (NEURONTIN) 300 MG CAPSULE    Take 3 capsules (900 mg total) by mouth every evening.    SULINDAC (CLINORIL) 200 MG TAB    Take 1 tablet (200 mg total) by mouth 2 (two) times daily.   Changed and/or Refilled Medications    Modified Medication Previous Medication    HYDROCORTISONE 2.5 % CREAM hydrocortisone 2.5 % cream       Apply topically 2 (two)  times daily.    Apply topically 2 (two) times daily.    SPIRONOLACTONE (ALDACTONE) 100 MG TABLET spironolactone (ALDACTONE) 100 MG tablet       Take 1 tablet (100 mg total) by mouth once daily.    Take 1 tablet (100 mg total) by mouth once daily.       Rj De Guzman M.D.  ==========================================================================  Subjective:   Patient ID: Nolan Srivastava is a 41 y.o. male.  has a past medical history of Borderline diabetes, Cellulitis (01/2016), Hypertension, essential (10/3/2020), Sleep apnea, and Venous insufficiency of leg (2014).   Chief Complaint: right leg pain      Problem List Items Addressed This Visit     Lymphedema of right lower extremity (Chronic)    Overview     DATE OF PROCEDURE:  07/23/2014     PREOPERATIVE DIAGNOSES:  1.  Edema of the right lower extremity.  2.  Venous insufficiency of the right greater saphenous vein.  3.  Pain of the right lower extremity.     POSTOPERATIVE DIAGNOSES:  1.  Edema of the right lower extremity.  2.  Venous insufficiency of the right greater saphenous vein.  3.  Pain of the right lower extremity.     PROCEDURE:  Endovenous laser ablation for right greater saphenous vein.     SURGEON:  Yasmany Willis M.D., F.A.C.S.           Current Assessment & Plan     Patient has been referred multiple times to lymphedema clinic and vascular.  Patient works away from home and is difficult to get to the appointments.  Patient does use a pump at home.  Strongly advised to follow-up with vascular and lymphedema clinic.  Patient would also benefit from bariatric surgery.           Hypertension, essential (Chronic)    Overview     CHRONIC. STABLE. BP Reviewed.  Compliant with BP medications. No SE reported.   (-) CP, SOB, palpitations, dizziness, lightheadedness, HA, arm numbness, tingling or weakness, syncope.  Creatinine   Date Value Ref Range Status   12/03/2021 0.8 0.5 - 1.4 mg/dL Final                Current Assessment & Plan     Counseled on  importance of hypertension disease course, I recommend ongoing Education for DASH-diet and exercise.  Counseled on medication regimen importance of treating high blood pressure.  Please be advised of risk of untreated blood pressure as discussed.  Please advised of ER precautions were given for symptoms of hypertensive urgency and emergency.             Encounter for long-term (current) use of medications (Chronic)    Overview     CHRONIC. Stable. Compliant with medications for managed conditions. See medication list. No SE reported.   Routine lab analysis is being monitored. Refills were addressed.  Lab Results   Component Value Date    WBC 8.85 12/03/2021    HGB 14.3 12/03/2021    HCT 44.0 12/03/2021    MCV 90 12/03/2021     12/03/2021         Chemistry        Component Value Date/Time     12/03/2021 1014    K 4.2 12/03/2021 1014     12/03/2021 1014    CO2 20 (L) 12/03/2021 1014    BUN 12 12/03/2021 1014    CREATININE 0.8 12/03/2021 1014    GLU 97 12/03/2021 1014        Component Value Date/Time    CALCIUM 9.0 12/03/2021 1014    ALKPHOS 74 12/03/2021 1014    AST 24 12/03/2021 1014    ALT 28 12/03/2021 1014    BILITOT 0.7 12/03/2021 1014    ESTGFRAFRICA >60.0 12/03/2021 1014    EGFRNONAA >60.0 12/03/2021 1014          Lab Results   Component Value Date    TSH 3.226 12/03/2021                Current Assessment & Plan     Complete history and physical was completed today.  Complete and thorough medication reconciliation was performed.  Discussed risks and benefits of medications.  Advised patient on orders and health maintenance.  We discussed old records and old labs if available.  Will request any records not available through epic.  Continue current medications listed on your summary sheet.             Chronic pain of lower extremity - Primary (Chronic)    Overview     Chronic.  Worsening.  Patient is on gabapentin 900 milligrams at bedtime.  He reports he is taking Tylenol six 8 times per day  at 1000 milligrams daily.  Patient knows that this is too much but he has to do something for the pain.  He also takes ibuprofen 800 milligrams 3 times a day.               Current Assessment & Plan     Adding gabapentin 300 milligrams twice during the daytime.  Continue 900 milligrams at bedtime.  Adding a short-term tramadol for severe pain relief.    Patient to seek bariatric surgery in hopes that this will help with his severe lymphedema.  Continue pump at home.  Follow-up with vascular lymphedema specialist and lymphedema clinic.           Lymphedema    Overview     Overview:   Patient with history of lymphedema to the right lower extremity status post several surgeries with great improvement as per patient and his wife.    Last Assessment & Plan:   Continue current therapy for lymphedema.           Class 3 obesity with alveolar hypoventilation, serious comorbidity, and body mass index (BMI) of 60.0 to 69.9 in adult    Overview     BMI Readings from Last 10 Encounters:   06/15/22 66.31 kg/m²   12/10/21 60.23 kg/m²   11/15/21 63.26 kg/m²   07/27/21 62.17 kg/m²   07/22/21 62.17 kg/m²   04/07/21 64.36 kg/m²   10/30/20 58.86 kg/m²   10/05/20 61.73 kg/m²   10/02/20 59.87 kg/m²   12/18/18 57.24 kg/m²     Chronic.  Worsening.  Significant comorbidities with lymphedema, hypertension           Current Assessment & Plan     Referral to bariatric surgery.  Discussed lifestyle modification with diet and exercise.                  Review of patient's allergies indicates:  No Known Allergies  Current Outpatient Medications   Medication Instructions    gabapentin (NEURONTIN) 900 mg, Oral, Nightly    gabapentin (NEURONTIN) 300 mg, Oral, 2 times daily, In addition to 900mg at night    hydrocortisone 2.5 % cream Topical (Top), 2 times daily    spironolactone (ALDACTONE) 100 mg, Oral, Daily    sulindac (CLINORIL) 200 mg, Oral, 2 times daily    traMADoL (ULTRAM) 50 mg, Oral, Every 6 hours PRN      I have reviewed the PMH,  "social history, FamilyHx, surgical history, allergies and medications documented / confirmed by the patient at the time of this visit.  Review of Systems   Constitutional: Negative for activity change and unexpected weight change.   HENT: Negative for hearing loss, rhinorrhea and trouble swallowing.    Eyes: Negative for discharge and visual disturbance.   Respiratory: Negative for chest tightness and wheezing.    Cardiovascular: Positive for leg swelling. Negative for chest pain and palpitations.   Gastrointestinal: Negative for blood in stool, constipation, diarrhea and vomiting.   Endocrine: Negative for polydipsia and polyuria.   Genitourinary: Negative for difficulty urinating, hematuria and urgency.   Musculoskeletal: Positive for arthralgias and myalgias. Negative for joint swelling and neck pain.   Neurological: Negative for weakness and headaches.   Psychiatric/Behavioral: Negative for confusion and dysphoric mood.     Objective:   /72   Pulse 81   Temp 97.6 °F (36.4 °C) (Other (see comments))   Ht 5' 9" (1.753 m)   Wt (!) 203.7 kg (449 lb)   SpO2 97%   BMI 66.31 kg/m²   Physical Exam  Vitals and nursing note reviewed.   Constitutional:       General: He is not in acute distress.     Appearance: He is well-developed. He is not diaphoretic.   HENT:      Head: Normocephalic and atraumatic.   Eyes:      Pupils: Pupils are equal, round, and reactive to light.   Cardiovascular:      Rate and Rhythm: Normal rate and regular rhythm.      Heart sounds: Normal heart sounds. No murmur heard.  Pulmonary:      Effort: Pulmonary effort is normal. No respiratory distress.      Breath sounds: Normal breath sounds. No wheezing.   Abdominal:      General: Bowel sounds are normal. There is no distension.      Palpations: Abdomen is soft.   Musculoskeletal:         General: Swelling and deformity present. Normal range of motion.      Cervical back: Normal range of motion and neck supple.      Right lower leg: " Edema present.      Left lower leg: Edema present.      Comments: Patient has severe lymphedema of the right lower extremity.   Skin:     General: Skin is warm and dry.      Capillary Refill: Capillary refill takes less than 2 seconds.      Findings: No rash.   Neurological:      General: No focal deficit present.      Mental Status: He is alert and oriented to person, place, and time. Mental status is at baseline.      Cranial Nerves: No cranial nerve deficit.      Motor: No weakness.      Gait: Gait abnormal.   Psychiatric:         Attention and Perception: He is attentive.         Mood and Affect: Mood is not anxious or depressed. Affect is not labile, blunt, angry or inappropriate.         Speech: He is communicative. Speech is not rapid and pressured, delayed, slurred or tangential.         Behavior: Behavior normal. Behavior is not agitated, slowed, aggressive, withdrawn, hyperactive or combative.         Thought Content: Thought content normal. Thought content is not paranoid or delusional. Thought content does not include homicidal or suicidal ideation. Thought content does not include homicidal or suicidal plan.         Cognition and Memory: Memory is not impaired.         Judgment: Judgment normal. Judgment is not impulsive or inappropriate.         Assessment:     1. Chronic pain of lower extremity, unspecified laterality    2. Encounter for long-term (current) use of medications    3. Hypertension, essential    4. Lymphedema of right lower extremity    5. Lymphedema    6. Class 3 obesity with alveolar hypoventilation, serious comorbidity, and body mass index (BMI) of 60.0 to 69.9 in adult      MDM:   Moderate risk.  Moderate complexity.  Total time: 34 minutes.  This includes total time spent on the encounter, which includes face to face time and non-face to face time preparing to see the patient (eg, review of previous medical records, tests), Obtaining and/or reviewing separately obtained history,  documenting clinical information in the electronic or other health record, independently interpreting results (not separately reported)/communicating results to the patient/family/caregiver, and/or care coordination (not separately reported).    I have Reviewed and summarized old records.  I have performed thorough medication reconciliation today and discussed risk and benefits of medications.  I have reviewed labs and discussed with patient.  All questions were answered.  I am requesting old records and will review them once they are available.     I have signed for the following orders AND/OR meds.  Orders Placed This Encounter   Procedures    Ambulatory referral/consult to Vascular Medicine     Standing Status:   Future     Standing Expiration Date:   7/15/2023     Referral Priority:   Routine     Referral Type:   Consultation     Referral Reason:   Specialty Services Required     Requested Specialty:   Vascular Medicine     Number of Visits Requested:   1    Ambulatory referral/consult to Bariatric Surgery     Standing Status:   Future     Standing Expiration Date:   7/15/2023     Referral Priority:   Routine     Referral Type:   Consultation     Referral Reason:   Specialty Services Required     Requested Specialty:   Bariatrics     Number of Visits Requested:   1     Medications Ordered This Encounter   Medications    gabapentin (NEURONTIN) 300 MG capsule     Sig: Take 1 capsule (300 mg total) by mouth 2 (two) times daily. In addition to 900mg at night     Dispense:  60 capsule     Refill:  11    hydrocortisone 2.5 % cream     Sig: Apply topically 2 (two) times daily.     Dispense:  454 g     Refill:  3    spironolactone (ALDACTONE) 100 MG tablet     Sig: Take 1 tablet (100 mg total) by mouth once daily.     Dispense:  90 tablet     Refill:  4     .    traMADoL (ULTRAM) 50 mg tablet     Sig: Take 1 tablet (50 mg total) by mouth every 6 (six) hours as needed for Pain.     Dispense:  28 each     Refill:   0     Quantity prescribed more than 7 day supply? No     Order Specific Question:   I have reviewed the Prescription Drug Monitoring Program (PDMP) database for this patient prior to prescribing the above opioid medication     Answer:   Yes        Follow up in about 3 months (around 9/15/2022), or if symptoms worsen or fail to improve, for Med refills, LAB RESULTS.  Future Appointments     Date Provider Specialty Appt Notes    9/15/2022 Rj De Guzman MD Family Medicine 3 month f/u    11/15/2022 Rj De Guzman MD Family Medicine annual        If no improvement in symptoms or symptoms worsen, advised to call/follow-up at clinic or go to ER. Patient voiced understanding and all questions/concerns were addressed.   DISCLAIMER: This note was compiled by using a speech recognition dictation system and therefore please be aware that typographical / speech recognition errors can and do occur.  Please contact me if you see any errors specifically.    Rj De Guzman M.D.       Office: [phone] [address] [place] [address]  FAX: [phone]       [phone]

## 2022-06-16 NOTE — PROGRESS NOTE ADULT - PROBLEM SELECTOR PLAN 1
Still feels sob: BUT NOT MUCH OF WHEEZING: He is an active smoker: Cont steroids as wellas BD and oxygen: HIS VENOUS ABG IS REVIEWED: HE LIKELY HAS CHR RETENTION AND MILD AC ON CHR HYPERCARBIC RESP FAILURE: WILL REPEAT HIS abg IN am: HE SEEMS COMFORTABLE: I DON'T THINK HE NEEDS BIPAP at this time  12/5: doing much better: change to po steroids and decrease the dose: no wheezing  12/6: Mild ac on chr hypercarbic resp failure: secondary to copd exacerbation: doing pretty well: alert and awake: no need for bipap  12/7 stable. continue current management  12/8 stable. diminished breath sounds no wheezing. wean off oxygen. goal of O2 sat greater than 88%  12/9  ; remains stable: off steorids: no wheezing  12/10: stable: no wheezing 21-Dec-2021

## 2022-06-23 NOTE — PATIENT PROFILE ADULT - DO YOU FEEL UNSAFE AT HOME, WORK, OR SCHOOL?
Med: doxycycline monohydrate (MONODOX)  Dosage: 100 MG capsule  Sig: Take 1 capsule by mouth 2 times daily.   Last office visit: 7-16-19  Next office visit: none    Patient takes this medication long term. Refilled per protocol.         no None

## 2022-06-29 NOTE — ED CDU PROVIDER SUBSEQUENT DAY NOTE - PROGRESS NOTE DETAILS
Detail Level: Detailed Detail Level: Zone Received sign out from ALVARO Melendrez, pt evaluated at bedside with MD Back, pt with elevated BGL >400, will give sliding scale insulin, bolus and repeat BMP. Will admit pt to medicine.

## 2022-07-06 NOTE — ED PROVIDER NOTE - NSFOLLOWUPINSTRUCTIONS_ED_ALL_ED_FT
-Begin with low Fodmap diet  -IBGuard  -Trial of Levsin as needed for abdominal cramping.  -Will complete stool studies and repeat colonoscopy with biopsies if severe diarrhea returns.   -Can always discuss sending to nutrition for IBS diet.  -Recommend continued control of stress/anxiety levels which could be contributing to symptoms.    Please follow up with your primary care doctor within 1 week.    Continue taking Levaquin as prescribed.    STOP taking the prednisone you were previously given.    For continued cough, take mucinex 600mg twice daily with water.    Continue to monitor your glucose.    Return to the ED for worsening shortness of breath, persistent fevers, chest pain, loss of consciousness, abdominal pain, nausea, vomiting, or any other concerns. Please follow up with your primary care doctor within 1 week.    Continue taking Levaquin as prescribed.    STOP taking the prednisone you were previously given.    For continued cough, take mucinex 600mg twice daily with water.  Take Tylenol 650mg every 6 hrs as needed for pain.   Continue using albuterol    Continue to monitor your glucose.    Return to the ED for worsening shortness of breath, persistent fevers, chest pain, loss of consciousness, abdominal pain, nausea, vomiting, or any other concerns.

## 2022-09-13 NOTE — PROGRESS NOTE ADULT - PROBLEM/PLAN-2
Subjective   Patient ID: Melva is a 14 month old female who is accompanied by:mother and GM    Well Child Assessment:  History was provided by the mother and grandmother. Melva lives with her mother, father and grandmother. Interval problems do not include recent illness or recent injury.   Nutrition  Types of intake include fruits and vegetables (purees, soft foods).   Dental  The patient does not have a dental home.   Elimination  Elimination problems do not include constipation, diarrhea or gas.   Behavioral  Behavioral issues do not include stubbornness or throwing tantrums.   Sleep  The patient sleeps in her crib.   Safety  Home is child-proofed? yes. There is no smoking in the home. Home has working smoke alarms? yes. Home has working carbon monoxide alarms? yes. There is an appropriate car seat in use.   Screening  Immunizations are up-to-date. There are no risk factors for hearing loss. There are no risk factors for anemia. There are no risk factors for tuberculosis. There are no risk factors for oral health.   Social  The caregiver enjoys the child. Childcare is provided at child's home. The childcare provider is a parent.       Feeds: Enfacare, switched fully over to whole milk. 8 oz every 4 hours 4 x per day (down from 5 times per day total at last visit including o/n bottle she has now cut out). Doing more of the safe purees as she is working ST on this.  Seems to enjoy them without signs of distress while eating them at home.    Used to be on 4 oz overnight 1-2 times also- has stopped this recently per Nutritionist to avoid too much milk  Nutritionist has advised switch to whole milk and alsd has addressed issue of risks of whole cow's milk overconsumption with Mom     No choking, gagging, congestion with feeds  Continues to improve well over time     spoonfeeds fruits and veggies well, mashed bananas recently which she loves     Sees speech weekly and is doing very well with purees 
introduction and oral liquid feeds, good progress.  Did better with stage 2 baby foods than meltable solids at recent appts since my last visit with her (some premature spillage with the meltables still per ST note)      Dev: clapping, peek a machuca, pulls to stand, samantha and recovers, not quite walking independently.  In PT and working on walking correctly, working on strengthening    Gets OT, ST, PT-recently referred to OT    Sometimes grabs ears/head/hair, and shakes head quickly once, not distressed, then goes back to what she was doing.  No apparent triggers    Pulm- follow up in November  Last use albuterol last winter  Is not taking Flovent anymore since last winter    Additional concerns today: None     Review of Systems   Constitutional: Negative for activity change, appetite change, chills, crying, fatigue, fever, irritability and unexpected weight change.   HENT: Negative for congestion, ear discharge, ear pain, hearing loss, mouth sores, nosebleeds, rhinorrhea, sneezing, sore throat and trouble swallowing.    Eyes: Negative for pain, discharge, redness, itching and visual disturbance.   Respiratory: Negative for apnea, cough, choking, wheezing and stridor.    Cardiovascular: Negative for chest pain, palpitations and cyanosis.   Gastrointestinal: Negative for abdominal distention, abdominal pain, blood in stool, constipation, diarrhea, nausea and vomiting.   Endocrine: Negative for polydipsia, polyphagia and polyuria.   Genitourinary: Negative for decreased urine volume, dysuria, enuresis, frequency, urgency and vaginal discharge.   Musculoskeletal: Negative for arthralgias, back pain, gait problem, joint swelling, myalgias, neck pain and neck stiffness.   Skin: Negative for pallor, rash and wound.   Allergic/Immunologic: Negative for environmental allergies, food allergies and immunocompromised state.   Neurological: Negative for seizures, syncope, speech difficulty, weakness and headaches. 
  Hematological: Negative for adenopathy. Does not bruise/bleed easily.   Psychiatric/Behavioral: Negative for behavioral problems and sleep disturbance.       Patient's medications, allergies, past medical, surgical, social and family histories were reviewed and updated as appropriate.    Objective   Vitals: Pulse 138   Temp 97.6 °F (36.4 °C)   Resp 34   Ht 30.71\" (78 cm)   Wt 9.255 kg (20 lb 6.5 oz)   HC 44 cm (17.32\")   BMI 15.21 kg/m²   BSA 0.44 m²   Growth parameters are noted and are appropriate for age.  Physical Exam  Vitals and nursing note reviewed.   Constitutional:       General: She is active. She is not in acute distress.     Appearance: She is well-developed.   HENT:      Head: Normocephalic and atraumatic.      Right Ear: External ear normal.      Left Ear: External ear normal.      Nose: Nose normal.      Mouth/Throat:      Mouth: Mucous membranes are moist.      Pharynx: Oropharynx is clear.      Neck: Normal range of motion and neck supple.   Eyes:      Extraocular Movements: Extraocular movements intact.      Conjunctiva/sclera: Conjunctivae normal.      Pupils: Pupils are equal, round, and reactive to light.   Cardiovascular:      Rate and Rhythm: Normal rate and regular rhythm.      Pulses: Pulses are strong.      Heart sounds: Normal heart sounds, S1 normal and S2 normal. No murmur heard.    No friction rub. No gallop.   Pulmonary:      Effort: Pulmonary effort is normal. No respiratory distress, nasal flaring or retractions.      Breath sounds: Normal breath sounds. No stridor or decreased air movement. No wheezing, rhonchi or rales.   Abdominal:      General: Bowel sounds are normal. There is no distension.      Palpations: Abdomen is soft. There is no mass.      Tenderness: There is no abdominal tenderness. There is no guarding or rebound.      Hernia: No hernia is present.   Genitourinary:     General: Normal vulva.      Labia: No rash.     Musculoskeletal:         General: Normal 
DISPLAY PLAN FREE TEXT
range of motion.   Skin:     General: Skin is warm.      Capillary Refill: Capillary refill takes less than 2 seconds.      Findings: No rash.   Neurological:      General: No focal deficit present.      Mental Status: She is alert.      Cranial Nerves: No cranial nerve deficit.      Motor: No weakness.      Coordination: Coordination normal.      Gait: Gait normal.         Assessment   Problem List Items Addressed This Visit        ENT    Teething infant       Gastrointestinal and Abdominal    Oropharyngeal dysphagia       Gravid and     Prematurity, birth weight 750-999 grams, with 25-26 completed weeks of gestation       Pulmonary and Pneumonias    BPD (bronchopulmonary dysplasia)       Symptoms and Signs    Developmental delay      Other Visit Diagnoses     Encounter for immunization    -  Primary    Relevant Orders    HIB VACC,PRP-OMP,IM(PEDVAXHIB) (Completed)    Encounter for routine child health examination with abnormal findings            Is doing well with weight gain.  Has dropped overnight milk bottle, which is good.  Encouraged to slowly decrease by one more bottle to get closer to max 24 oz whole milk daily to avoid anemia risks with milk overconsumption.  Will need to work closely with Nutritionist on this however to assure continued good weight gain.  Discussed adding whole milk yogurt to diet also to replace healthy fats/calcium/phos/vit D from decreasing milk amount slowly.    Continue close follow with ST to work on foods introductions also.    Follow up due with Pulm in November, is doing well from this perspective off of Flovent for now.    Discussed ear/hair grabbing is likely behavioral in nature, will continue to monitor.    Screening tests  Developmental milestones were reviewed and were: borderline in Comm and P-S x 2- in therapies, overall doing well corrected for prematurity    Immunizations today: per orders.  History of previous adverse reactions to immunizations? 
no  Immunizations given today and vaccine counseling including benefits, risks, and adverse reactions were provided by myself during the visit.    Follow-up 4-6 weeks for weight check.  
DISPLAY PLAN FREE TEXT

## 2022-09-30 NOTE — ED PROVIDER NOTE - CHPI ED SYMPTOMS NEG
no body aches/no chest pain/no cough/no edema/no fever/no headache/no hemoptysis/no wheezing/no chills/no diaphoresis
same name as above

## 2022-10-03 NOTE — ED PROVIDER NOTE - MUSCULOSKELETAL, MLM
Medication(s) Requested: testosterone 200mg/ml  1ml q14 days  Last office visit: 10-7-21, fuv 10-11-22  Last refill: #6  7-6-22  Is the patient due for refill of this medication(s): Yes  PDMP review: Criteria met. Forwarded to Physician/ELIN for signature.     
Spine appears normal, range of motion is not limited, no muscle or joint tenderness

## 2022-10-25 NOTE — PATIENT PROFILE ADULT - DO YOU FEEL UNSAFE AT SCHOOL?
Result Name: OCCULT BLOOD - FIT  Full Name of Ordering Provider: Leodan Denny MD  Result Normal Range: Negative  Result Value: Negative   Patient informed lab values are within the normal reference range.  Patient encouraged to follow-up with provider for additional questions.       not applicable

## 2022-10-31 NOTE — ED CDU PROVIDER INITIAL DAY NOTE - OBJECTIVE STATEMENT
Pt is a 58yoM with h/o HTN, HLD, CAD, BPDO, COPD on home O2 2.5 L, on prednisone for a couple of months;   pw SOB. Pt states he has been without his oxygen compressor at home for 1 week and ran out of his albuterol yesterday. Also states he ran out of his prednisone yesterday. Pt denies fever/chills/ new cough/ change in sputum, leg pain/ swelling, chest pain.  Pt denies any h/o intubation or BIPAP use. Last hospitalization was 1 mo at [facility] for COPD exacerbation.   PMD: Dr. Ken.
Detail Level: Simple
Render Risk Assessment In Note?: no
Comment: w/ AGA

## 2022-11-16 NOTE — ED PROVIDER NOTE - PMH
Physical Therapy    Visit Type: treatment  Precautions:  Medical precautions:  fall risk; standard precautions.    Lines:       Lines in chart and on patient reviewed, precautions maintained throughout session.  SUBJECTIVE  Patient agreed to participate in therapy this date.  \" I don't want to fall but I want to get up\" \"Help me Help me\"  Patient / Family Goal: return to previous functional status and maximize function    Pain     Location: denies pain however very anxious.     At onset of session (out of 10): 0  RN informed on pain level     OBJECTIVE     Cognitive Status   Orientation    - Oriented to: person   - Disoriented to: place, time and situation  Functional Communication   - Forms of Communication: nods/gestures appropriately and verbal  Attention Span    - Attention: impaired   - Attention impairment: distractibility and perseveration  Safety Awareness/Insight   - impulsive, decreased awareness of need for assistance and decreased awareness of need for safety  Awareness of Deficits   - decreased awareness of deficits    Patient Activity Tolerance: 1 to 1 activity to rest         Bed Mobility  - Rolling left: minimal assist  - Rolling right: minimal assist  - Repositioning in bed: with tactile cues, moderate assist  - Supine to long sit: contact guard/touching/steadying assist, minimal assist  Working in long sitting and then speech arrived. Move  Bed into chair position for increased participation. Noted patient able to sit up right while using bed rails for assist. .   Transfers  - Sit to stand: not attempted due to not medically appropriate or safe  - Stand to sit: not attempted due to not medically appropriate or safe  Patient rocking and pull at rails. Unable to redirect patient to pull to EOB of bed and place feet on the ground. May benefit from lift equipment.        Interventions     Supine    Lower Extremity: Bilateral: heel slides, quad sets, gluteus sets, ankle pumps, bridging, hip abduction  and hip adduction, AAROM and AROM, 10 reps, 1 sets  Seated    Lower Extremity: Bilateral: seated hip flexion, knee flexion, hip abduction and knee extensions (bed in chair postion ), AAROM and AROM, 10 reps, 1 sets  Neuromuscular Re-Education: Patient working on sitting balance which consisted of weight shift, reaching task and sitting tolerance.  Patient working with the bed in a chair position and using the bed rails for assist.   Training provided: activity tolerance, behavioral modification, body mechanics, breathing/relaxation, energy conservation, positioning, neuromuscular reeducation and bed mobility training    Skilled input: Verbal instruction/cues, tactile instruction/cues and facilitation  Verbal Consent: Writer verbally educated and received verbal consent for hand placement, positioning of patient, and techniques to be performed today from patient for clothing adjustments for techniques, hand placement and palpation for techniques and therapist position for techniques as described above and how they are pertinent to the patient's plan of care.         ASSESSMENT   Impairments: balance, strength, activity tolerance, endurance, shortness of breath and safety awareness  Functional Limitations: all functional mobility   May benefit from lift equipment if more alert . Patient remains highly distracted.       Discharge Recommendations  Recommendation for Discharge: PT IL: Patient is appropriate for daily Physical Therapy  PT/OT Mobility Equipment for Discharge: Pt owns RW    Progress: slow progress, decreased activity tolerance    • Skilled therapy is required to address these limitations in attempt to maximize the patient's independence.    Education:   Learning Assessment:  - Primary learner: patient  - Are they ready to learn: yes  - Preferred learning style: verbal  - Barriers to learning: no barriers apparent at this time  Education provided during session:  - Receiving Education: patient  - Results of  above outlined education: Demonstrates understanding and Needs reinforcement    Patient at End of Session:   Location: in bed  Safety measures: alarm system in place/re-engaged, bed rails x2, call light within reach, equipment intact and lines intact  Handoff to: nurse    PLAN   Suggestions for next session as indicated: PT Frequency: 3-5 x per week  Frequency Comments: LF, 5x/wk, CC, 1/5    Interventions: balance, bed mobility, strengthening, gait training, energy conservation, safety education, functional transfer training, endurance training and behavioral modification  Agreement to plan and goals: patient agrees with goals and treatment plan        GOALS  Long Term Goals: (to be met by time of discharge from hospital)  Supine to sit: Patient will complete supine to sit contact guard or touching/steadying.  Status: progressing/ongoing  Sit to stand: Patient will complete sit to stand transfer with 2-wheeled walker, minimal assist.   Status: progressing/ongoing  Stand to sit: Patient will complete stand to sit transfer with 2-wheeled walker, minimal assist.   Status: progressing/ongoing  Ambulation (even): Patient will ambulate on even surface for 30 feet with 2-wheeled walker, moderate assist.   Status: progressing/ongoing    Documented in the chart in the following areas: Assessment.      Therapy procedure time and total treatment time can be found documented on the Time Entry flowsheet   CAD (coronary artery disease)    COPD (chronic obstructive pulmonary disease)    DM (diabetes mellitus)    GERD (gastroesophageal reflux disease)    HLD (hyperlipidemia)    Insomnia    Polyarthritis

## 2022-11-16 NOTE — ED ADULT NURSE NOTE - GASTROINTESTINAL ASSESSMENT
dr Baum  I have referred this patient to you .  She is symptomatic which wart nurse of breath.  Please review her Holter.  I was wondering if you could see her any sooner?    Thank you very much   Doctor Jyoti  
WDL

## 2022-12-13 NOTE — PATIENT PROFILE ADULT - TRANSPORTATION
no rashes , no suspicious lesions , no areas of discoloration , no jaundice present , no masses , no tenderness on palpation
no

## 2022-12-16 NOTE — CHART NOTE - NSCHARTNOTESELECT_GEN_ALL_CORE
[facility]: Cancer Care Note    Received voicemail message from patient this afternoon, following up to see if we have received the results from her Breast Cancer Index (BCI) testing yet.  Chart review was completed - noted we faxed the BCI test order form to [facility] on 10/10/22, and received fax confirmation.  However, does not appear we have received the results yet.    Writer placed telephone call to [facility] this afternoon (phone number: [phone]), to check on the status - they stated they never received our order, and they do not have a record of this patient in their system.      Writer completed another order online this afternoon.  Order Number: [id number].  Telephone call was placed to patient and provided her with this update.  Also routed note to patient's care team to provide update on testing status.                                 Jacek Altamirano, RN, BSN, OCN  RN Care Coordinator - Oncology  [facility]    
Event Note
Event Note
Transfer Note
DME/Event Note
Event Note
Event Note

## 2023-01-25 NOTE — ED ADULT NURSE NOTE - CAS DISCH ACCOMP BY
RT/MD/RN Sarecycline Counseling: Patient advised regarding possible photosensitivity and discoloration of the teeth, skin, lips, tongue and gums.  Patient instructed to avoid sunlight, if possible.  When exposed to sunlight, patients should wear protective clothing, sunglasses, and sunscreen.  The patient was instructed to call the office immediately if the following severe adverse effects occur:  hearing changes, easy bruising/bleeding, severe headache, or vision changes.  The patient verbalized understanding of the proper use and possible adverse effects of sarecycline.  All of the patient's questions and concerns were addressed.

## 2023-03-09 NOTE — PATIENT PROFILE ADULT - NSPROMUTINFOINDIVIDFT_GEN_A_NUR
ALLERGIES:  Not on File    CC  Pilar Cyst    HISTORY OF PRESENT ILLNESS:  The patient is a 18 year old male here for Pilar Cyst.    Location: Crown of scalp  Duration: 5 months  Symptoms: Bald spot, dry  Previous treatment: LOTRIMIN      No past medical history on file.    No current outpatient medications on file.     No current facility-administered medications for this visit.       EXAM:  The patient is pleasant, well appearing, no distress.  Mood and affect are appropriate.  Oriented to person, place, time.  On the crown of the scalp there is an approximately 2 cm patch of shorter terminal hairs.  There is no dermatitis or papules.  There is bony ridging at a suture line in the skull.        IMPRESSION AND PLAN:  Possible alopecia areata, the area is regrowing.  No additional treatment is required.  If this or other lesions recur I recommend he follow up with me for re-evaluation.    Scalp nodule.  This is a normal bony anatomic variant of his skull.  Reassured       FOLLOW UP:  PRN    On 3/9/2023, Jaelyn CALI RMA scribed the services personally performed by Eliana Ayala MD    The documentation recorded by the scribe accurately and completely reflects the service(s) I personally performed and the decisions made by me.        
Personal history of skin cancer: no      Biopsy History with Dr. Ayala: no            Patient would like communication of their results via:      Cell Phone:   Telephone Information:   Mobile [phone]     Okay to leave a message containing results? Yes      Medications, allergies, and tobacco use verified  No vitals needed per   
n/a

## 2023-03-27 NOTE — PHYSICAL THERAPY INITIAL EVALUATION ADULT - PATIENT/FAMILY/SIGNIFICANT OTHER GOALS STATEMENT, PT EVAL
Patient scheduled.might be just a few minutes late due to short notice but she will try her best to get here    To get better and stronger.

## 2023-04-10 NOTE — ED ADULT NURSE NOTE - EXTENSIONS OF SELF_ADULT
None
Wound Evaluated By: Joaquin Singh
Detail Level: Detailed
Add 75284 Cpt? (Important Note: In 2017 The Use Of 01794 Is Being Tracked By Cms To Determine Future Global Period Reimbursement For Global Periods): yes

## 2023-05-02 NOTE — ED ADULT TRIAGE NOTE - O2 FLOW (L/MIN)
4 VTAMA Counseling: I discussed with the patient that VTAMA is not for use in the eyes, mouth or mouth. They should call the office if they develop any signs of allergic reactions to VTAMA. The patient verbalized understanding of the proper use and possible adverse effects of VTAMA.  All of the patient's questions and concerns were addressed.

## 2023-05-10 NOTE — DISCHARGE NOTE PROVIDER - NSDCCAREPROVSEEN_GEN_ALL_CORE_FT
Shan, Nehemiah Hess, Courtney Ramos, Klarissa
[FreeTextEntry1] : -CBC done today\par -Pt advised to f/u cardiologist for heart monitoring prior to period\par -Pt can resume walking activity\par -Rx for pelvic sono given for 4-6 mo f/u s/p myomectomy\par -Tele to review results\par \par

## 2023-05-12 NOTE — DISCHARGE NOTE PROVIDER - NSDCQMPCI_CARD_ALL_CORE
May 12, 2023     Patient: Casey Coyle  YOB: 2013  Date of Visit: 5/12/2023      To Whom it May Concern:    Casey Coyle is under my professional care  Sahra Lemons was seen in my office on 5/12/2023  Sahra Seat [place]asant may return to school on 5/15/23  If you have any questions or concerns, please don't hesitate to call           Sincerely,          Kamilah Chairez MD        CC: No Recipients
No

## 2023-05-12 NOTE — PROGRESS NOTE ADULT - SUBJECTIVE AND OBJECTIVE BOX
Dr. Juarez  Office [phone]  Cell [phone]  [place] JOHN  Cell [phone]      Patient is a 59y old  Male who presents with a chief complaint of SOB (07 Dec 2019 12:38)      Patient seen and examined at bedside. No chest pain/sob    VITALS:  T(F): 97.8 (12-07-19 @ 14:11), Max: 97.8 (12-07-19 @ 14:11)  HR: 89 (12-07-19 @ 14:11)  BP: 131/72 (12-07-19 @ 14:11)  RR: 19 (12-07-19 @ 14:11)  SpO2: 100% (12-07-19 @ 14:11)  Wt(kg): --        PHYSICAL EXAM:  Constitutional: NAD  Neck: No JVD  Respiratory: CTAB, no wheezes, rales or rhonchi  Cardiovascular: S1, S2, RRR  Gastrointestinal: BS+, soft, NT/ND  Extremities: No peripheral edema    Hospital Medications:   MEDICATIONS  (STANDING):  albuterol/ipratropium for Nebulization 3 milliLiter(s) Nebulizer every 6 hours  aspirin enteric coated 81 milliGRAM(s) Oral daily  atorvastatin 40 milliGRAM(s) Oral at bedtime  budesonide 160 MICROgram(s)/formoterol 4.5 MICROgram(s) Inhaler 2 Puff(s) Inhalation two times a day  chlorhexidine 4% Liquid 1 Application(s) Topical <User Schedule>  clopidogrel Tablet 75 milliGRAM(s) Oral daily  dextrose 5%. 1000 milliLiter(s) (50 mL/Hr) IV Continuous <Continuous>  dextrose 50% Injectable 12.5 Gram(s) IV Push once  dextrose 50% Injectable 25 Gram(s) IV Push once  dextrose 50% Injectable 25 Gram(s) IV Push once  enoxaparin Injectable 40 milliGRAM(s) SubCutaneous daily  famotidine    Tablet 20 milliGRAM(s) Oral daily  insulin glargine Injectable (LANTUS) 75 Unit(s) SubCutaneous at bedtime  insulin glargine Injectable (LANTUS) 80 Unit(s) SubCutaneous every morning  insulin lispro (HumaLOG) corrective regimen sliding scale   SubCutaneous three times a day before meals  insulin lispro Injectable (HumaLOG) 15 Unit(s) SubCutaneous three times a day before meals  melatonin 5 milliGRAM(s) Oral at bedtime  nicotine -  14 mG/24Hr(s) Patch 1 patch Transdermal daily  predniSONE   Tablet 20 milliGRAM(s) Oral every 12 hours  risperiDONE   Tablet 0.5 milliGRAM(s) Oral three times a day  sodium chloride 0.9%. 1000 milliLiter(s) (60 mL/Hr) IV Continuous <Continuous>      LABS:        Creatinine Trend: 0.62 <--, 0.65 <--, 0.63 <--, 0.73 <--, 0.86 <--            Urine Studies:    Urine Sodium <35      [12-04-19 @ 22:03]  Urine Osmolality 489      [12-04-19 @ 22:24]    HbA1c 10.4      [12-04-19 @ 08:53]        RADIOLOGY & ADDITIONAL STUDIES: yes

## 2023-06-16 NOTE — ED ADULT NURSE NOTE - CAS TRG GENERAL NORM CIRC DET
This is a 58 year old White female here today for   Chief Complaint   Patient presents with   • Office Visit     PC ADULT         HISTORY OF PRESENT ILLNESS:  Patient presents to establish care.  She has several things to discuss  She is seeing someone for weight loss and had labs which we discuss.  She is using a trial of ozempic with good results and wants to continue through our office.    She also reports some abdominal bloating and back pain.  She is concerned it is related to her ovaries.  She denies fevers, difficulty having bowel movements.  She exercises regularly and eats a healthy diet.      I have reviewed the patient's medications and allergies, past medical, surgical, social and family history, updating these as appropriate.  See Histories section of the EMR (electronic medical record) for a display of this information.    REVIEW OF SYSTEMS: As modified in the HPI (history of present illness), otherwise: negative   PHYSICAL EXAM:  Vital Signs:   Visit Vitals  /78 (BP Location: RUE - Right upper extremity, Patient Position: Sitting, Cuff Size: Regular)   Pulse 76   Temp 98.2 °F (36.8 °C) (Oral)   Ht 5' 5\" (1.651 m)   Wt 78.9 kg (174 lb)   LMP 03/11/2019 (Approximate)   SpO2 97%   BMI 28.96 kg/m²      Constitutional: Well groomed, well nourished, in no apparent distress.   Eyes: Pupils equal, round, react to light and accommodation. Normal conjunctivae, lids, and irides. Extraocular movements intact.  Cardiac: Normal rate, regular rhythm, no murmurs, gallops, or rubs. No peripheral edema.   Gastrointestinal: Abdomen soft, non-tender, non-distended. No hepatosplenomegaly. Normal bowel sounds x4 quadrants.  Skin: Warm, dry, intact. No acute rashes or pallor.  Psychiatric: Alert and oriented x 3, normal mood, memory and affect.    LAB:   Admission on 06/06/2023, Discharged on 06/06/2023   Component Date Value Ref Range Status   • Case Report 06/06/2023    Final                    Value:Surgical  Pathology                                Case: [id number]                                 Authorizing Provider:  Pinky Sinha MD     Collected:           06/06/2023 1305              Ordering Location:     [facility] [place]                 Received:            06/06/2023 1507                                     Gastrointestinal (GI)                                                                               Center                                                                       Pathologist:           Alyssa Luna MD                                                           Specimen:    Large Intestine, ascending colon polyp                                                    • Pathologic Diagnosis 06/06/2023    Final                    Value:This result contains rich text formatting which cannot be displayed here.   • Clinical Information 06/06/2023    Final                    Value:This result contains rich text formatting which cannot be displayed here.   • Gross Description 06/06/2023    Final                    Value:This result contains rich text formatting which cannot be displayed here.   • Microscopic Description 06/06/2023    Final                    Value:This result contains rich text formatting which cannot be displayed here.   • Disclaimer 06/06/2023    Final                    Value:This result contains rich text formatting which cannot be displayed here.          ASSESSMENT AND PLAN:    Diagnoses and all orders for this visit:  Establishing care with new doctor, encounter for  Bloating  -     US PELVIS NON-OB EXTERNALTRANSABDOMINAL; Future  Encounter for vitamin deficiency screening  -     Comprehensive Metabolic Panel; Future           Patient's questions answered. Patient states understanding and agrees with plan of care. Denies further questions.     No follow-ups on file.    Patient Care Team:  Allie Abdul APNP as PCP - General (Nurse Practitioner)    Allie Abdul  APNP     Strong peripheral pulses

## 2023-07-25 NOTE — PHYSICAL THERAPY INITIAL EVALUATION ADULT - PHYSICAL ASSIST/NONPHYSICAL ASSIST: BED TO CHAIR, REHAB EVAL
Patient calling in stating he has not heard anything back about his surgical procedure being authorized yet. Patient requesting call back.      CB: [phone] verbal cues/1 person assist

## 2023-08-14 NOTE — PROGRESS NOTE ADULT - PROBLEM/PLAN-11
----- Message from Kimberly Marie sent at 8/14/2023 10:24 AM CDT -----  Regarding: refill  Type:  RX Refill Request    Who Called: pt  Refill or New Rx:refill  RX Name and Strength:lisdexamfetamine (VYVANSE) 30 MG capsule  How is the patient currently taking it? (ex. 1XDay):1 day  Is this a 30 day or 90 day RX:  Preferred Pharmacy with phone number:[facility]  Local or Mail Order:local  Ordering Provider:dr chapman  Would the patient rather a call back or a response via [facility]? C/b  Best Call Back Number:[phone]  Additional Information:  any coupons or savings initiative for this medication because even with insurance it is expensive        
DISPLAY PLAN FREE TEXT
DISPLAY PLAN FREE TEXT

## 2023-08-21 NOTE — PROGRESS NOTE ADULT - ASSESSMENT
[address] [place] [address]  Dept: [phone]  Dept Fax: [phone]  Loc: [address] is a 29 y.o. Marck Barnett presents today for her medical conditions/complaints as noted below. [address] c/o of New Patient (To get established,  bumps on both buttocks for a few weeks doesn't itch )      :     HPI    Pt here to establish care. Denies any concerns  Denies depression  On OCP  Pap last year    States she has a rash on bilateral buttocks, developed 3 weeks ago, boyfriend also has it, does not itch    Current Outpatient Medications   Medication Sig Dispense Refill    JUNEL FE 1.5/30 1.5-30 MG-MCG tablet Take 1 tablet by mouth daily      sulfamethoxazole-trimethoprim (BACTRIM DS;SEPTRA DS) 800-160 MG per tablet Take 1 tablet by mouth 2 times daily for 10 days 20 tablet 0    podofilox (CONDYLOX) 0.5 % external solution Apply topically 2 times daily, 3 days a week for 4 weeks . 3.5 mL 2    ibuprofen (IBU) 800 MG tablet Take 1 tablet by mouth 3 times daily (with meals) for 20 doses. 20 tablet 0     No current facility-administered medications for this visit. History reviewed. No pertinent past medical history.    Past Surgical History:   Procedure Laterality Date    MANDIBLE RECONSTRUCTION       Family History   Problem Relation Age of Onset    Heart Disease Paternal Grandfather     Cancer Paternal Grandfather      Social History     Tobacco Use    Smoking status: Never    Smokeless tobacco: Never   Substance Use Topics    Alcohol use: No        No Known Allergies    Health Maintenance   Topic Date Due    COVID-19 Vaccine (1) Never done    Depression Screen  Never done    Flu vaccine (1) Never done    HIV screen  08/21/2024 (Originally 3/4/2008)    Cervical cancer screen  11/08/2025    DTaP/Tdap/Td vaccine (9 - Td or Tdap) 03/03/2030    Hepatitis A vaccine  Completed    Hib vaccine  Completed -AECOPD; underlying stage 4 dz  -Hx pulm htn  -Acute on chronic hypoxic resp failure  -Chronic hypercarbic resp failure; pt refusing bpap  -Diastolic CHF  -Rhinovirus infection  -Abnormal CT chest; infiltrate vs atelectasis. No fever, no wbc  -Smoker    RECC:   HFO2. Titrate down to get to level he can use at home. Noct bpap encouraged. Continue IV steroids; taper. Nebs. Smoking cessation a must. DVT prophylaxis. Need to follow CT chest to resolution at outpt.

## 2023-09-19 NOTE — H&P ADULT - BIRTH SEX
Male Zygomaticofacial Flap Text: Given the location of the defect, shape of the defect and the proximity to free margins a zygomaticofacial flap was deemed most appropriate for repair.  Using a sterile surgical marker, the appropriate flap was drawn incorporating the defect and placing the expected incisions within the relaxed skin tension lines where possible. The area thus outlined was incised deep to adipose tissue with a #15 scalpel blade with preservation of a vascular pedicle.  The skin margins were undermined to an appropriate distance in all directions utilizing iris scissors.  The flap was then placed into the defect and anchored with interrupted buried subcutaneous sutures.

## 2023-10-05 ENCOUNTER — INPATIENT (INPATIENT)
Facility: HOSPITAL | Age: 63
LOS: 7 days | Discharge: NURSING, REHAB OR LONG-TERM CARE | DRG: 870 | End: 2023-10-13
Attending: INTERNAL MEDICINE | Admitting: INTERNAL MEDICINE
Payer: MEDICAID

## 2023-10-05 VITALS
DIASTOLIC BLOOD PRESSURE: 77 MMHG | WEIGHT: 229.94 LBS | OXYGEN SATURATION: 97 % | HEIGHT: 73 IN | TEMPERATURE: 99 F | HEART RATE: 124 BPM | SYSTOLIC BLOOD PRESSURE: 124 MMHG | RESPIRATION RATE: 26 BRPM

## 2023-10-05 DIAGNOSIS — J96.01 ACUTE RESPIRATORY FAILURE WITH HYPOXIA: ICD-10-CM

## 2023-10-05 LAB
ALBUMIN SERPL ELPH-MCNC: 3.8 G/DL — SIGNIFICANT CHANGE UP (ref 3.5–5)
ALP SERPL-CCNC: 96 U/L — SIGNIFICANT CHANGE UP (ref 40–120)
ALT FLD-CCNC: 23 U/L DA — SIGNIFICANT CHANGE UP (ref 10–60)
ANION GAP SERPL CALC-SCNC: 4 MMOL/L — LOW (ref 5–17)
APPEARANCE UR: ABNORMAL
APTT BLD: 33.6 SEC — SIGNIFICANT CHANGE UP (ref 24.5–35.6)
AST SERPL-CCNC: 21 U/L — SIGNIFICANT CHANGE UP (ref 10–40)
BACTERIA # UR AUTO: ABNORMAL /HPF
BASOPHILS # BLD AUTO: 0.07 K/UL — SIGNIFICANT CHANGE UP (ref 0–0.2)
BASOPHILS NFR BLD AUTO: 0.3 % — SIGNIFICANT CHANGE UP (ref 0–2)
BILIRUB SERPL-MCNC: 0.4 MG/DL — SIGNIFICANT CHANGE UP (ref 0.2–1.2)
BILIRUB UR-MCNC: ABNORMAL
BUN SERPL-MCNC: 24 MG/DL — HIGH (ref 7–18)
CALCIUM SERPL-MCNC: 9.8 MG/DL — SIGNIFICANT CHANGE UP (ref 8.4–10.5)
CHLORIDE SERPL-SCNC: 96 MMOL/L — SIGNIFICANT CHANGE UP (ref 96–108)
CO2 SERPL-SCNC: 37 MMOL/L — HIGH (ref 22–31)
COLOR SPEC: SIGNIFICANT CHANGE UP
CREAT SERPL-MCNC: 0.9 MG/DL — SIGNIFICANT CHANGE UP (ref 0.5–1.3)
D DIMER BLD IA.RAPID-MCNC: 317 NG/ML DDU — HIGH
DIFF PNL FLD: ABNORMAL
EGFR: 97 ML/MIN/1.73M2 — SIGNIFICANT CHANGE UP
EOSINOPHIL # BLD AUTO: 0.09 K/UL — SIGNIFICANT CHANGE UP (ref 0–0.5)
EOSINOPHIL NFR BLD AUTO: 0.4 % — SIGNIFICANT CHANGE UP (ref 0–6)
GAS PNL BLDA: SIGNIFICANT CHANGE UP
GLUCOSE BLDC GLUCOMTR-MCNC: 299 MG/DL — HIGH (ref 70–99)
GLUCOSE BLDC GLUCOMTR-MCNC: 344 MG/DL — HIGH (ref 70–99)
GLUCOSE BLDC GLUCOMTR-MCNC: 355 MG/DL — HIGH (ref 70–99)
GLUCOSE BLDC GLUCOMTR-MCNC: 422 MG/DL — HIGH (ref 70–99)
GLUCOSE BLDC GLUCOMTR-MCNC: 426 MG/DL — HIGH (ref 70–99)
GLUCOSE SERPL-MCNC: 326 MG/DL — HIGH (ref 70–99)
GLUCOSE UR QL: 100 MG/DL
HCT VFR BLD CALC: 45.4 % — SIGNIFICANT CHANGE UP (ref 39–50)
HGB BLD-MCNC: 13.6 G/DL — SIGNIFICANT CHANGE UP (ref 13–17)
HIV 1 & 2 AB SERPL IA.RAPID: SIGNIFICANT CHANGE UP
IMM GRANULOCYTES NFR BLD AUTO: 1.1 % — HIGH (ref 0–0.9)
INR BLD: 1.08 RATIO — SIGNIFICANT CHANGE UP (ref 0.85–1.18)
KETONES UR-MCNC: ABNORMAL MG/DL
LACTATE SERPL-SCNC: 0.8 MMOL/L — SIGNIFICANT CHANGE UP (ref 0.7–2)
LACTATE SERPL-SCNC: 1.4 MMOL/L — SIGNIFICANT CHANGE UP (ref 0.7–2)
LEUKOCYTE ESTERASE UR-ACNC: ABNORMAL
LYMPHOCYTES # BLD AUTO: 0.64 K/UL — LOW (ref 1–3.3)
LYMPHOCYTES # BLD AUTO: 2.9 % — LOW (ref 13–44)
MCHC RBC-ENTMCNC: 28 PG — SIGNIFICANT CHANGE UP (ref 27–34)
MCHC RBC-ENTMCNC: 30 GM/DL — LOW (ref 32–36)
MCV RBC AUTO: 93.6 FL — SIGNIFICANT CHANGE UP (ref 80–100)
MONOCYTES # BLD AUTO: 1.34 K/UL — HIGH (ref 0–0.9)
MONOCYTES NFR BLD AUTO: 6.1 % — SIGNIFICANT CHANGE UP (ref 2–14)
NEUTROPHILS # BLD AUTO: 19.73 K/UL — HIGH (ref 1.8–7.4)
NEUTROPHILS NFR BLD AUTO: 89.2 % — HIGH (ref 43–77)
NITRITE UR-MCNC: NEGATIVE — SIGNIFICANT CHANGE UP
NRBC # BLD: 0 /100 WBCS — SIGNIFICANT CHANGE UP (ref 0–0)
NT-PROBNP SERPL-SCNC: 876 PG/ML — HIGH (ref 0–125)
PCP SPEC-MCNC: SIGNIFICANT CHANGE UP
PH UR: 5 — SIGNIFICANT CHANGE UP (ref 5–8)
PLATELET # BLD AUTO: 186 K/UL — SIGNIFICANT CHANGE UP (ref 150–400)
POTASSIUM SERPL-MCNC: 5.1 MMOL/L — SIGNIFICANT CHANGE UP (ref 3.5–5.3)
POTASSIUM SERPL-SCNC: 5.1 MMOL/L — SIGNIFICANT CHANGE UP (ref 3.5–5.3)
PROT SERPL-MCNC: 8.3 G/DL — SIGNIFICANT CHANGE UP (ref 6–8.3)
PROT UR-MCNC: 300 MG/DL
PROTHROM AB SERPL-ACNC: 12.3 SEC — SIGNIFICANT CHANGE UP (ref 9.5–13)
RBC # BLD: 4.85 M/UL — SIGNIFICANT CHANGE UP (ref 4.2–5.8)
RBC # FLD: 12.3 % — SIGNIFICANT CHANGE UP (ref 10.3–14.5)
RBC CASTS # UR COMP ASSIST: 10 /HPF — HIGH (ref 0–4)
SARS-COV-2 RNA SPEC QL NAA+PROBE: SIGNIFICANT CHANGE UP
SODIUM SERPL-SCNC: 137 MMOL/L — SIGNIFICANT CHANGE UP (ref 135–145)
SP GR SPEC: 1.02 — SIGNIFICANT CHANGE UP (ref 1–1.03)
TROPONIN I, HIGH SENSITIVITY RESULT: 44 NG/L — SIGNIFICANT CHANGE UP
UROBILINOGEN FLD QL: 0.2 MG/DL — SIGNIFICANT CHANGE UP (ref 0.2–1)
WBC # BLD: 22.12 K/UL — HIGH (ref 3.8–10.5)
WBC # FLD AUTO: 22.12 K/UL — HIGH (ref 3.8–10.5)
WBC UR QL: ABNORMAL /HPF (ref 0–5)

## 2023-10-05 PROCEDURE — 99291 CRITICAL CARE FIRST HOUR: CPT | Mod: 25

## 2023-10-05 PROCEDURE — 74177 CT ABD & PELVIS W/CONTRAST: CPT | Mod: 26

## 2023-10-05 PROCEDURE — 71275 CT ANGIOGRAPHY CHEST: CPT | Mod: 26

## 2023-10-05 PROCEDURE — 31500 INSERT EMERGENCY AIRWAY: CPT

## 2023-10-05 PROCEDURE — 71045 X-RAY EXAM CHEST 1 VIEW: CPT | Mod: 26,77

## 2023-10-05 PROCEDURE — 93010 ELECTROCARDIOGRAM REPORT: CPT

## 2023-10-05 PROCEDURE — 70450 CT HEAD/BRAIN W/O DYE: CPT | Mod: 26

## 2023-10-05 PROCEDURE — 71045 X-RAY EXAM CHEST 1 VIEW: CPT | Mod: 26

## 2023-10-05 RX ORDER — IPRATROPIUM/ALBUTEROL SULFATE 18-103MCG
1 AEROSOL WITH ADAPTER (GRAM) INHALATION
Qty: 0 | Refills: 0 | DISCHARGE

## 2023-10-05 RX ORDER — ETOMIDATE 2 MG/ML
30 INJECTION INTRAVENOUS ONCE
Refills: 0 | Status: COMPLETED | OUTPATIENT
Start: 2023-10-05 | End: 2023-10-05

## 2023-10-05 RX ORDER — PROPOFOL 10 MG/ML
10 INJECTION, EMULSION INTRAVENOUS
Qty: 1000 | Refills: 0 | Status: DISCONTINUED | OUTPATIENT
Start: 2023-10-05 | End: 2023-10-05

## 2023-10-05 RX ORDER — INSULIN LISPRO 100/ML
15 VIAL (ML) SUBCUTANEOUS ONCE
Refills: 0 | Status: COMPLETED | OUTPATIENT
Start: 2023-10-05 | End: 2023-10-05

## 2023-10-05 RX ORDER — SIMETHICONE 80 MG/1
1 TABLET, CHEWABLE ORAL
Qty: 0 | Refills: 0 | DISCHARGE

## 2023-10-05 RX ORDER — INSULIN GLARGINE 100 [IU]/ML
15 INJECTION, SOLUTION SUBCUTANEOUS
Qty: 0 | Refills: 0 | DISCHARGE

## 2023-10-05 RX ORDER — SODIUM CHLORIDE 9 MG/ML
1000 INJECTION, SOLUTION INTRAVENOUS ONCE
Refills: 0 | Status: COMPLETED | OUTPATIENT
Start: 2023-10-05 | End: 2023-10-05

## 2023-10-05 RX ORDER — FUROSEMIDE 40 MG
40 TABLET ORAL ONCE
Refills: 0 | Status: COMPLETED | OUTPATIENT
Start: 2023-10-05 | End: 2023-10-05

## 2023-10-05 RX ORDER — ALBUTEROL 90 UG/1
2 AEROSOL, METERED ORAL
Qty: 0 | Refills: 0 | DISCHARGE

## 2023-10-05 RX ORDER — ACETAMINOPHEN 500 MG
2 TABLET ORAL
Qty: 0 | Refills: 0 | DISCHARGE

## 2023-10-05 RX ORDER — MEROPENEM 1 G/30ML
1000 INJECTION INTRAVENOUS EVERY 8 HOURS
Refills: 0 | Status: ACTIVE | OUTPATIENT
Start: 2023-10-05 | End: 2023-10-12

## 2023-10-05 RX ORDER — AZITHROMYCIN 500 MG/1
500 TABLET, FILM COATED ORAL ONCE
Refills: 0 | Status: COMPLETED | OUTPATIENT
Start: 2023-10-05 | End: 2023-10-05

## 2023-10-05 RX ORDER — PROPOFOL 10 MG/ML
10 INJECTION, EMULSION INTRAVENOUS
Qty: 1000 | Refills: 0 | Status: DISCONTINUED | OUTPATIENT
Start: 2023-10-05 | End: 2023-10-07

## 2023-10-05 RX ORDER — CHLORHEXIDINE GLUCONATE 213 G/1000ML
15 SOLUTION TOPICAL EVERY 12 HOURS
Refills: 0 | Status: DISCONTINUED | OUTPATIENT
Start: 2023-10-05 | End: 2023-10-06

## 2023-10-05 RX ORDER — POLYETHYLENE GLYCOL 3350 17 G/17G
1 POWDER, FOR SOLUTION ORAL
Qty: 0 | Refills: 0 | DISCHARGE

## 2023-10-05 RX ORDER — DEXTROSE 50 % IN WATER 50 %
50 SYRINGE (ML) INTRAVENOUS ONCE
Refills: 0 | Status: COMPLETED | OUTPATIENT
Start: 2023-10-05 | End: 2023-10-05

## 2023-10-05 RX ORDER — ALBUTEROL 90 UG/1
2.5 AEROSOL, METERED ORAL ONCE
Refills: 0 | Status: DISCONTINUED | OUTPATIENT
Start: 2023-10-05 | End: 2023-10-06

## 2023-10-05 RX ORDER — ASPIRIN/CALCIUM CARB/MAGNESIUM 324 MG
1 TABLET ORAL
Qty: 0 | Refills: 0 | DISCHARGE

## 2023-10-05 RX ORDER — INSULIN HUMAN 100 [IU]/ML
5 INJECTION, SOLUTION SUBCUTANEOUS ONCE
Refills: 0 | Status: COMPLETED | OUTPATIENT
Start: 2023-10-05 | End: 2023-10-05

## 2023-10-05 RX ORDER — FENTANYL CITRATE 50 UG/ML
25 INJECTION INTRAVENOUS ONCE
Refills: 0 | Status: DISCONTINUED | OUTPATIENT
Start: 2023-10-05 | End: 2023-10-05

## 2023-10-05 RX ORDER — SUCCINYLCHOLINE CHLORIDE 100 MG/5ML
150 SYRINGE (ML) INTRAVENOUS ONCE
Refills: 0 | Status: COMPLETED | OUTPATIENT
Start: 2023-10-05 | End: 2023-10-05

## 2023-10-05 RX ORDER — LATANOPROST 0.05 MG/ML
1 SOLUTION/ DROPS OPHTHALMIC; TOPICAL
Qty: 0 | Refills: 0 | DISCHARGE

## 2023-10-05 RX ORDER — PROPOFOL 10 MG/ML
10 INJECTION, EMULSION INTRAVENOUS
Qty: 500 | Refills: 0 | Status: DISCONTINUED | OUTPATIENT
Start: 2023-10-05 | End: 2023-10-05

## 2023-10-05 RX ORDER — HEPARIN SODIUM 5000 [USP'U]/ML
5000 INJECTION INTRAVENOUS; SUBCUTANEOUS EVERY 8 HOURS
Refills: 0 | Status: DISCONTINUED | OUTPATIENT
Start: 2023-10-05 | End: 2023-10-13

## 2023-10-05 RX ORDER — INSULIN LISPRO 100/ML
VIAL (ML) SUBCUTANEOUS
Refills: 0 | Status: DISCONTINUED | OUTPATIENT
Start: 2023-10-05 | End: 2023-10-06

## 2023-10-05 RX ORDER — LANOLIN ALCOHOL/MO/W.PET/CERES
1 CREAM (GRAM) TOPICAL
Qty: 0 | Refills: 0 | DISCHARGE

## 2023-10-05 RX ORDER — FENTANYL CITRATE 50 UG/ML
50 INJECTION INTRAVENOUS ONCE
Refills: 0 | Status: DISCONTINUED | OUTPATIENT
Start: 2023-10-05 | End: 2023-10-05

## 2023-10-05 RX ORDER — NICOTINE POLACRILEX 2 MG
1 GUM BUCCAL DAILY
Refills: 0 | Status: DISCONTINUED | OUTPATIENT
Start: 2023-10-05 | End: 2023-10-13

## 2023-10-05 RX ORDER — BUDESONIDE AND FORMOTEROL FUMARATE DIHYDRATE 160; 4.5 UG/1; UG/1
2 AEROSOL RESPIRATORY (INHALATION)
Qty: 0 | Refills: 0 | DISCHARGE

## 2023-10-05 RX ORDER — CEFTRIAXONE 500 MG/1
1000 INJECTION, POWDER, FOR SOLUTION INTRAMUSCULAR; INTRAVENOUS ONCE
Refills: 0 | Status: COMPLETED | OUTPATIENT
Start: 2023-10-05 | End: 2023-10-05

## 2023-10-05 RX ORDER — TRAZODONE HCL 50 MG
1 TABLET ORAL
Qty: 0 | Refills: 0 | DISCHARGE

## 2023-10-05 RX ORDER — MEROPENEM 1 G/30ML
1000 INJECTION INTRAVENOUS ONCE
Refills: 0 | Status: COMPLETED | OUTPATIENT
Start: 2023-10-05 | End: 2023-10-05

## 2023-10-05 RX ORDER — INSULIN GLARGINE 100 [IU]/ML
15 INJECTION, SOLUTION SUBCUTANEOUS AT BEDTIME
Refills: 0 | Status: DISCONTINUED | OUTPATIENT
Start: 2023-10-05 | End: 2023-10-08

## 2023-10-05 RX ORDER — IPRATROPIUM/ALBUTEROL SULFATE 18-103MCG
3 AEROSOL WITH ADAPTER (GRAM) INHALATION ONCE
Refills: 0 | Status: COMPLETED | OUTPATIENT
Start: 2023-10-05 | End: 2023-10-05

## 2023-10-05 RX ORDER — MEROPENEM 1 G/30ML
INJECTION INTRAVENOUS
Refills: 0 | Status: ACTIVE | OUTPATIENT
Start: 2023-10-05 | End: 2023-10-12

## 2023-10-05 RX ADMIN — HEPARIN SODIUM 5000 UNIT(S): 5000 INJECTION INTRAVENOUS; SUBCUTANEOUS at 10:59

## 2023-10-05 RX ADMIN — Medication 3: at 16:45

## 2023-10-05 RX ADMIN — FENTANYL CITRATE 50 MICROGRAM(S): 50 INJECTION INTRAVENOUS at 10:30

## 2023-10-05 RX ADMIN — Medication 15 UNIT(S): at 12:19

## 2023-10-05 RX ADMIN — Medication 150 MILLIGRAM(S): at 08:07

## 2023-10-05 RX ADMIN — Medication 40 MILLIGRAM(S): at 08:06

## 2023-10-05 RX ADMIN — FENTANYL CITRATE 25 MICROGRAM(S): 50 INJECTION INTRAVENOUS at 22:09

## 2023-10-05 RX ADMIN — PROPOFOL 6.26 MICROGRAM(S)/KG/MIN: 10 INJECTION, EMULSION INTRAVENOUS at 08:03

## 2023-10-05 RX ADMIN — Medication 15 UNIT(S): at 14:25

## 2023-10-05 RX ADMIN — HEPARIN SODIUM 5000 UNIT(S): 5000 INJECTION INTRAVENOUS; SUBCUTANEOUS at 21:24

## 2023-10-05 RX ADMIN — MEROPENEM 100 MILLIGRAM(S): 1 INJECTION INTRAVENOUS at 11:59

## 2023-10-05 RX ADMIN — CEFTRIAXONE 100 MILLIGRAM(S): 500 INJECTION, POWDER, FOR SOLUTION INTRAMUSCULAR; INTRAVENOUS at 11:02

## 2023-10-05 RX ADMIN — Medication 3 MILLILITER(S): at 08:06

## 2023-10-05 RX ADMIN — Medication 6: at 11:26

## 2023-10-05 RX ADMIN — FENTANYL CITRATE 25 MICROGRAM(S): 50 INJECTION INTRAVENOUS at 22:30

## 2023-10-05 RX ADMIN — INSULIN GLARGINE 15 UNIT(S): 100 INJECTION, SOLUTION SUBCUTANEOUS at 22:33

## 2023-10-05 RX ADMIN — FENTANYL CITRATE 50 MICROGRAM(S): 50 INJECTION INTRAVENOUS at 09:30

## 2023-10-05 RX ADMIN — Medication 1 PATCH: at 19:54

## 2023-10-05 RX ADMIN — PROPOFOL 6.26 MICROGRAM(S)/KG/MIN: 10 INJECTION, EMULSION INTRAVENOUS at 16:45

## 2023-10-05 RX ADMIN — Medication 125 MILLIGRAM(S): at 08:06

## 2023-10-05 RX ADMIN — Medication 1 PATCH: at 11:02

## 2023-10-05 RX ADMIN — MEROPENEM 100 MILLIGRAM(S): 1 INJECTION INTRAVENOUS at 21:23

## 2023-10-05 RX ADMIN — PROPOFOL 6.26 MICROGRAM(S)/KG/MIN: 10 INJECTION, EMULSION INTRAVENOUS at 22:09

## 2023-10-05 RX ADMIN — ETOMIDATE 30 MILLIGRAM(S): 2 INJECTION INTRAVENOUS at 08:07

## 2023-10-05 RX ADMIN — Medication 50 MILLILITER(S): at 09:33

## 2023-10-05 RX ADMIN — SODIUM CHLORIDE 1000 MILLILITER(S): 9 INJECTION, SOLUTION INTRAVENOUS at 10:43

## 2023-10-05 RX ADMIN — INSULIN HUMAN 5 UNIT(S): 100 INJECTION, SOLUTION SUBCUTANEOUS at 09:32

## 2023-10-05 RX ADMIN — CHLORHEXIDINE GLUCONATE 15 MILLILITER(S): 213 SOLUTION TOPICAL at 18:33

## 2023-10-05 RX ADMIN — PROPOFOL 6.26 MICROGRAM(S)/KG/MIN: 10 INJECTION, EMULSION INTRAVENOUS at 10:43

## 2023-10-05 RX ADMIN — AZITHROMYCIN 255 MILLIGRAM(S): 500 TABLET, FILM COATED ORAL at 08:07

## 2023-10-05 NOTE — CONSULT NOTE ADULT - ASSESSMENT
Pneumonia - right sided ( healthcare acquired)  UTI  Sepsis  Fevers  Leukocytosis      Plan - Cont Meropenem 1gm iv q8hrs  await culture results

## 2023-10-05 NOTE — H&P ADULT - ATTENDING COMMENTS
IMP: This is a  64 yo man  from [facility], active smoker, with  CAD, CHF on nocturnal BIPAP, COPD ( MULTIPLE intubations in past), peripheral neuropathy, GERD, HTN, HLD, obesity, polyarthritis, Pulmonary HTN, TIA, Vit D Def, presents with hypoxemia from nursing facility and lethargy and altered mental status. Upon evaluation in ED, patient afebrile, bp noted to be 124/77, tachycardic to 124bpm, and noted to be hypoxic on subsequently intubated. Physical examination noted for course lung examination, mark catheter placed and immediately draining 1.5L of pyuria. Labs significant for ABG 7.19/105/230/40,  potassium noted to be 6.2,  utox positive for benzo, wbc noted to be 22 and lactate normal. UA showing findings concerning for UTI, blood cultures pending. CT head, CTA chest and abdomen pending. Patient admitted to ICU for Acute on chronic hypoxic hypercapnic respiratory failure requiring emergent intubation.           ASSESSMENT     - Acute encephalopathy   - Acute on chronic hypoxic hypercapnic respiratory failure  - Sepsis 2/2 UTI  - Hyperkalemia   - COPD ex   - DM uncontrol   - Chronic CHF   - Hx of CAD  - HTN HLD  -GERD         Plan     - Admit to ICU   - Sedation and pain control for comfort and vent synchrony   - Continue vent support , adjust as per ABG   - Solumedrol  - Duonebs   - Hemodynamic monitoring   - Monitor cardiac enzymes   - 2 DECHO   - IV antibx to cover enterococcus ( hx of enterococcus UTI)   - Cultures   - NGT feed   - Mark cath   - Nicotine patch   - Monitor blood glucose with coverage   - DVT GI prophy

## 2023-10-05 NOTE — ED PROVIDER NOTE - CLINICAL SUMMARY MEDICAL DECISION MAKING FREE TEXT BOX
64 y/o man, History of COPD, smoker, CHF, hypertension, diabetes, bipolar disorder, full code, sent in from [facility] for abrupt onset shortness of breath, generalized weakness, hypoxia--labs, EKG, CXR ordered;  Pt declined with worsening lethargy and hypoxia, therefore intubated in ED; informed Dr. Ramos and informed Dr. Marquez for ICU admission.

## 2023-10-05 NOTE — ED ADULT TRIAGE NOTE - CCCP TRG CHIEF CMPLNT
sent from [facility] for low o2 level 74% on 4 l nasal sent from [facility] for sob/low o2 level 74% on 4 l nasal/shortness of breath

## 2023-10-05 NOTE — ED ADULT NURSE REASSESSMENT NOTE - NS ED NURSE REASSESS COMMENT FT1
Received pt on non-rebreather... MD ordered for intubation pt non responsive verbally with labored breathing. Pt intubated with 7.5 23@lip no complications.  Bilateral IVs placed. blood cultures drawn. Foely placed with noted urine retention 1800 in mark UA sent. Pt has copious secretions yellow/tan in color through ET tube and some oral suction. Skin is intact. Started on low dose of propofol due to hypotension pt given 1L bolus. Meds given per orders. Will continue to monitor per policy.

## 2023-10-05 NOTE — ED PROVIDER NOTE - CONSTITUTIONAL, MLM
normal... Ill appearing, lethargic, nonverbal, disoriented to person, place, time/situation and in no apparent distress.

## 2023-10-05 NOTE — H&P ADULT - ASSESSMENT
Patient is a 62 yo male from [facility], active smoker, with a PMHx of CAD, CHF on nocturnal BIPAP, COPD ( MULTIPLE intubations in past), peripheral neuropathy, GERD, HTN, HLD, obesity, polyarthritis, Pulmonary HTN, TIA, Vit D Def, presents with hypoxemia from nursing facility and lethargy and altered mental status. Upon evaluation in ED, patient afebrile, bp noted to be 124/77, tachycardic to 124bpm, and noted to be hypoxic on subsequently intubated. Physical examination noted for course lung examination, mark catheter placed and immediately draining 1.5L of pyuria. Labs significant for ABG 7.19/105/230/40,  potassium noted to be 6.2,  utox positive for benzo, wbc noted to be 22 and lactate normal. UA showing findings concerning for UTI, blood cultures pending. CT head, CTA chest and abdomen pending. Patient admitted to ICU for Acute on chronic hypoxic hypercapnic respiratory failure requiring emergent intubation.    #Acute encephalopathy   #Acute on chronic hypoxic hypercapnic respiratory failure  #Sepsis 2/2 UTI  #Hyperkalemia   # Hx of COPD  #Hx of DM   # Chronic CHF   # Hx of CAD  # HTN  # HLD  # GERD     Neurological  # Acute encephalopathy  - patient noted to be lethargic upon arrival  - patient then subsequently intubated for airway protection  - noted to have C02 level of 105, and noted to have UTI  - Acute encephalopathy can be 2/2 Acute on chronic CO2 retention vs Sepsis 2/2 UTI vs CVA vs electrolyte abnormalities vs Urinary retention   - U-tox noted for benzos   - will titrate vent settings  - continue with sedation with propofol   - continue rocephin  consider ampicillin as patient had enterococcus UTI in past   - f/u urine and blood cultures   - repeat ABG   - f/u CT head     Pulmonary  # Acute on chronic hypoxic hypercapnic respiratory failure   - noted to have hx of COPD on Nocturnal bipap  - patient intubated in ED, due to lethargy and hypoxemia   - can be 2/2 CO2 retention noted to be 105, vs PE (unlikely)  - repeat CO2 levels   - f/u CTA chest r/o PE   - continue with home nebulizers through vent     #Hx of COPD  - patient noted to be on symbicort, albuterol and spiriva at facility  - will resume meds nebs via vent   - monitor co2 levels  - continue with  ventilation   - titrate ventilator as tolerated     Cardiac  # HX of CAD   - patient noted to be on ASA at home   - can resume meds once NGT is in place  - troponin noted to be negative  - f/u EKG     #HTN   - patient noted to be on antihypertensives at home   - noted to be on propofol, patient is normotensive  - hold home anti hypertensives for now     #Chronic CHF   - patient noted to have hx of Chronic CHF  - last echo noted  Patient is a 64 yo male from [facility], active smoker, with a PMHx of CAD, CHF on nocturnal BIPAP, COPD ( MULTIPLE intubations in past), peripheral neuropathy, GERD, HTN, HLD, obesity, polyarthritis, Pulmonary HTN, TIA, Vit D Def, presents with hypoxemia from nursing facility and lethargy and altered mental status. Upon evaluation in ED, patient afebrile, bp noted to be 124/77, tachycardic to 124bpm, and noted to be hypoxic on subsequently intubated. Physical examination noted for course lung examination, mark catheter placed and immediately draining 1.5L of pyuria. Labs significant for ABG 7.19/105/230/40,  potassium noted to be 6.2,  utox positive for benzo, wbc noted to be 22 and lactate normal. UA showing findings concerning for UTI, blood cultures pending. CT head, CTA chest and abdomen pending. Patient admitted to ICU for Acute on chronic hypoxic hypercapnic respiratory failure requiring emergent intubation.    #Acute encephalopathy   #Acute on chronic hypoxic hypercapnic respiratory failure  #Sepsis 2/2 UTI  #Hyperkalemia   # Hx of COPD  #Hx of DM   # Chronic CHF   # Hx of CAD  # HTN  # HLD  # GERD     Neurological  # Acute encephalopathy  - patient noted to be lethargic upon arrival  - patient then subsequently intubated for airway protection  - noted to have C02 level of 105, and noted to have UTI  - Acute encephalopathy can be 2/2 Acute on chronic CO2 retention vs Sepsis 2/2 UTI vs CVA vs electrolyte abnormalities vs Urinary retention   - U-tox noted for benzos   - will titrate vent settings  - continue with sedation with propofol   - continue rocephin  consider ampicillin as patient had enterococcus UTI in past   - f/u urine and blood cultures   - repeat ABG   - f/u CT head     Pulmonary  # Acute on chronic hypoxic hypercapnic respiratory failure   - noted to have hx of COPD on Nocturnal bipap  - patient intubated in ED, due to lethargy and hypoxemia   - can be 2/2 CO2 retention noted to be 105, vs PE (unlikely)  - repeat CO2 levels   - f/u CTA chest r/o PE, f/u ct abdomen   - continue with home nebulizers through vent     #Hx of COPD  - patient noted to be on symbicort, albuterol and spiriva at facility  - will resume meds nebs via vent   - monitor co2 levels  - continue with  ventilation   - titrate ventilator as tolerated     Cardiac  # HX of CAD   - patient noted to be on ASA at home   - can resume meds once NGT is in place  - troponin noted to be negative  - f/u EKG     #HTN   - patient noted to be on antihypertensives at home   - noted to be on propofol, patient is normotensive  - hold home anti hypertensives for now     #Chronic CHF   - patient noted to have hx of Chronic CHF  - last echo noted to show EF>70%  - will resume home medications    Renal  - no acute issues     Infectious disease  #Sepsis  - patient noted to be meet septic criteria with leukocytosis, HR and source of infection  - noted to have UTI, prior culture growing enterococcus  - will start meropenem  - Infectious disease consult Dr. Tsai  - f/u urine and blood cultures     Heme  - no acute issues    GI  - NPO    Endo   #Hyperkalemia  - noted to have K of 6.2  - s/p 5 units of insulin and dextrose   - repeat K   - f/u EKG    #HX of DM  - noted to be on lantus 30 units QHS  - on SSI  - q6hrs finger sticks  - if persistently high can resume lantus and titrate towards home dose    Skin  - no acute issues    Lines  - peripheral IV lines    Disposition   Patient accepted and transferred to ICU

## 2023-10-05 NOTE — CONSULT NOTE ADULT - GASTROINTESTINAL
soft/nondistended/normal active bowel sounds/no guarding/no rigidity/no organomegaly/no masses palpable

## 2023-10-05 NOTE — ED PROVIDER NOTE - OBJECTIVE STATEMENT
62 y/o man, History of COPD, smoker, CHF, hypertension, diabetes, bipolar disorder, full code, sent in from [facility] for abrupt onset shortness of breath, generalized weakness, elevated heart rate, hypoxia to the 70s.  History is limited as patient is altered and nonverbal upon presentation.

## 2023-10-05 NOTE — H&P ADULT - HISTORY OF PRESENT ILLNESS
64 yo male from [place], active smoker, with a PMHx of CAD, CHF on nocturnal BIPAP, COPD ( MULTIPLE intubations in past), peripheral neuropathy, GERD, HTN, HLD, obesity, polyarthritis, Pulmonary HTN, TIA, Vit D Def, presents with hypoxemia from nursing facility and lethargy. Patient unable to provide medical history. As per ED, documentation patient had an abrupt onset shortness of breath, generalized weakness, elevated heart rate, hypoxia to the 70s.     Upon chart review, patient was admitted to [facility] ICU in March 2022 due to acute on chronic hypoxic hypercapnic respiratory failure. Patient was eventually weaned off ventilator and then discharged home.   Patient is a 62 yo male from [facility], active smoker, with a PMHx of CAD, CHF on nocturnal BIPAP, COPD ( MULTIPLE intubations in past), peripheral neuropathy, GERD, HTN, HLD, obesity, polyarthritis, Pulmonary HTN, TIA, Vit D Def, presents with hypoxemia from nursing facility and lethargy. Patient unable to provide medical history. As per ED, documentation patient had an abrupt onset shortness of breath, generalized weakness, elevated heart rate, hypoxia to the 70s.     Upon chart review, patient was admitted to [facility] ICU in March 2022 due to acute on chronic hypoxic hypercapnic respiratory failure. Patient was eventually weaned off ventilator and then discharged home.

## 2023-10-05 NOTE — ED ADULT TRIAGE NOTE - CHIEF COMPLAINT QUOTE
sent from [facility] for low o2 level 74 %  4 liter nasal sent from [facility] for sob/low o2 level 74 %  4 liter nasal

## 2023-10-05 NOTE — CONSULT NOTE ADULT - SUBJECTIVE AND OBJECTIVE BOX
HPI:  Patient is a 62 yo male from [place], active smoker, with a PMHx of CAD, CHF on nocturnal BIPAP, COPD ( MULTIPLE intubations in past), peripheral neuropathy, GERD, HTN, HLD, obesity, polyarthritis, Pulmonary HTN, TIA, Vit D Def, presents with hypoxemia from nursing facility and lethargy. Patient unable to provide medical history. As per ED, documentation patient had an abrupt onset shortness of breath, generalized weakness, elevated heart rate, hypoxia to the 70s.     Upon chart review, patient was admitted to [facility] ICU in 2022 due to acute on chronic hypoxic hypercapnic respiratory failure. Patient was eventually weaned off ventilator and then discharged home.   (05 Oct 2023 08:23)      PAST MEDICAL & SURGICAL HISTORY:  COPD (chronic obstructive pulmonary disease)  Oxygen 2-3L at home      Stented coronary artery  2017      HLD (hyperlipidemia)      Pulmonary HTN      Type 2 diabetes mellitus without complication, with long-term current use of insulin      DM (diabetes mellitus)      CAD (coronary artery disease)      GERD (gastroesophageal reflux disease)      Insomnia      CHF (congestive heart failure)      HTN (hypertension)      Mood disorder      No significant past surgical history          No Known Allergies      Meds:  albuterol    0.083%. 2.5 milliGRAM(s) Nebulizer once  chlorhexidine 0.12% Liquid 15 milliLiter(s) Oral Mucosa every 12 hours  heparin   Injectable 5000 Unit(s) SubCutaneous every 8 hours  insulin glargine Injectable (LANTUS) 15 Unit(s) SubCutaneous at bedtime  insulin lispro (ADMELOG) corrective regimen sliding scale   SubCutaneous three times a day before meals  meropenem  IVPB 1000 milliGRAM(s) IV Intermittent every 8 hours  meropenem  IVPB      nicotine -   7 mG/24Hr(s) Patch 1 Patch Transdermal daily  propofol Infusion 10.003 MICROgram(s)/kG/Min IV Continuous <Continuous>      SOCIAL HISTORY:  Smoker:  YES / NO        PACK YEARS:                         WHEN QUIT?  ETOH use:  YES / NO               FREQUENCY / QUANTITY:  Ilicit Drug use:  YES / NO  Occupation:  Assisted device use (Cane / Walker):  Live with:    FAMILY HISTORY:  No family history of hypertension    No family history of mental disorder    FH: myocardial infarction (Mother)        VITALS:  Vital Signs Last 24 Hrs  T(C): 38 (05 Oct 2023 16:00), Max: 38 (05 Oct 2023 16:00)  T(F): 100.4 (05 Oct 2023 16:00), Max: 100.4 (05 Oct 2023 16:00)  HR: 100 (05 Oct 2023 17:30) (85 - 124)  BP: 95/69 (05 Oct 2023 17:30) (77/63 - 124/77)  BP(mean): 78 (05 Oct 2023 17:30) (69 - 91)  RR: 25 (05 Oct 2023 17:30) (21 - 27)  SpO2: 93% (05 Oct 2023 17:30) (87% - 100%)    Parameters below as of 05 Oct 2023 17:00  Patient On (Oxygen Delivery Method): ventilator        LABS/DIAGNOSTIC TESTS:                          13.6   22.12 )-----------( 186      ( 05 Oct 2023 05:25 )             45.4     WBC Count: 22.12 K/uL (10-05 @ 05:25)      10-05    137  |  96  |  24<H>  ----------------------------<  326<H>  5.1   |  37<H>  |  0.90    Ca    9.8      05 Oct 2023 05:25    TPro  8.3  /  Alb  3.8  /  TBili  0.4  /  DBili  x   /  AST  21  /  ALT  23  /  AlkPhos  96  1005      Urinalysis Basic - ( 05 Oct 2023 08:30 )    Color: Dark Yellow / Appearance: Turbid / S.020 / pH: x  Gluc: x / Ketone: Trace mg/dL  / Bili: Small / Urobili: 0.2 mg/dL   Blood: x / Protein: 300 mg/dL / Nitrite: Negative   Leuk Esterase: Moderate / RBC: 10 /HPF / WBC Too Numerous to count /HPF   Sq Epi: x / Non Sq Epi: x / Bacteria: Too Numerous to count /HPF        LIVER FUNCTIONS - ( 05 Oct 2023 05:25 )  Alb: 3.8 g/dL / Pro: 8.3 g/dL / ALK PHOS: 96 U/L / ALT: 23 U/L DA / AST: 21 U/L / GGT: x             PT/INR - ( 05 Oct 2023 05:25 )   PT: 12.3 sec;   INR: 1.08 ratio         PTT - ( 05 Oct 2023 05:25 )  PTT:33.6 sec    LACTATE:Lactate, Blood: 1.4 mmol/L (10-05 @ 08:15)  Lactate, Blood: 0.8 mmol/L (10-05 @ 06:19)      ABG - ABG - ( 05 Oct 2023 10:41 )  pH, Arterial: 7.39  pH, Blood: x     /  pCO2: 56    /  pO2: 67    / HCO3: 34    / Base Excess: 7.1   /  SaO2: 97                  CULTURES:       RADIOLOGY:< from: Xray Chest 1 View AP/PA (10.05.23 @ 12:36) >    ACC: [id number] EXAM:  XR CHEST AP OR PA 1V   ORDERED BY: CHRISTIANO SEAMAN     PROCEDURE DATE:  10/05/2023          INTERPRETATION:  INDICATION: Postintubation    Portable chest at 10:58 AM comparison 12:11 PM    COMPARISON: 5:20 AM    Lung bases are presently not included. Patient rotated.    FINDINGS:  Examination performed for position of ET tube which is in the mid   trachea. The visualized lung fields are clear. The heart size and   mediastinum cannot be adequately evaluated.    Impression:    ET tube tip mid trachea.    --- End of Report ---             SUJIT MEHTA DO; Attending Radiologist  This document has been electronically signed. Oct  5 2023 12:52PM    < end of copied text >  ----------------------------------------------------------------------------------------------------------------------------------------  ACC: [id number] EXAM:  CT ABDOMEN AND PELVIS IC   ORDERED BY: TERE DAIGLE     ACC: [id number] EXAM:  CT ANGIO CHEST PULM ART [facility]   ORDERED BY: CHRISTIANO SEAMAN     PROCEDURE DATE:  10/05/2023          INTERPRETATION:  CTA CHEST AND CT ABDOMEN AND PELVIS    INDICATION: Admitting Dxs: J96.01 ACUTE RESPIRATORY FAILURE WITH HYPOXIA.   Shortness of breath. Tachycardia. Rule out pulmonary embolism.    TECHNIQUE: Enhanced helical images were obtained of the chest, abdomen   and pelvis. Coronal and sagittalimages were reconstructed. Maximum   intensity projection images were generated.    CONTRAST/COMPLICATIONS:  IV Contrast: Omnipaque 350 (accession [id number]), IV contrast documented   in unlinked concurrent exam (accession [id number])  90 cc administered   10   cc discarded  Oral Contrast: NONE  Complications: None reported at time of study completion      COMPARISON: 3/17/2020 CT chest.    FINDINGS:    PULMONARY ARTERIES: No pulmonary embolism. Main pulmonary artery diameter   3.8 cm.    MEDIASTINUM: Mildly enlarged mediastinal nodes; reference right   paratracheal 2.1 x 1.2 cm node (image 61, series 6). Right hilar   lymphadenopathy; reference 2.4 x 1.2 cm right hilar node (image 78,   series 6).    Qualitatively enlarged right ventricle. Trace pericardial effusion.   Thoracic aorta normal caliber.    AIRWAYS, LUNGS, PLEURA: Satisfactory positioning of endotracheal tube.   Peribronchial thickening and mucus impacted airways of the right middle   lobe and bilateral lower lobes. Peripheral consolidation in the lateral   right middle lobe and right base. Few patchy opacities at the left base.   Emphysema. No pleural effusion.    ABDOMEN and PELVIS: Subcentimeter hepatic hypodense lesions not   adequately characterized, but similar compared to 3/17/2020 CT chest. A 4   cm splenic lesion with Hounsfield units of 39 appears increased in size   compared to 3/17/2020 where it was 1.3 cm. Left adrenal 1.4 cm nodule   unchanged compared to 3/17/2020. Right renal 2 cm hypodense lesion,   likely cyst. Additional right renal subcentimeter lesions too small to   characterize.    Gallbladder, pancreas, right adrenal gland, and left kidney unremarkable.    Tejada catheter within decompressed urinary bladder. Few bubbles of gas   within the urinary bladder may be related to Tejada catheter insertion.   Unremarkable prostate.    No bowel obstruction.  Colonic diverticulosis. No free fluid. No   abdominal or pelvic lymphadenopathy. Abdominal aorta normal caliber.    BONES: Unremarkable.    SOFTTISSUES: Bilateral gynecomastia.    IMPRESSION:    No pulmonary embolism.    Aspiration and/or infection primarily involving the right middle lobe and   right base.    Mildly enlarged mediastinal nodes and right hilar lymphadenopathy;   indeterminate, but could be reactive. Recommend CT chest follow-up in 3   months to assess stability.    4 cm splenic lesion increased in size compared to 3/17/2020 is   indeterminate. Consider characterization with MRI abdomen.    --- End of Report ---             OSMAR GUPTA MD; Attending Radiologist  This document has been electronically signed. Oct  5 2023 10:45AM    < end of copied text >        ROS  [  ] UNABLE TO ELICIT               HPI:  Patient is a 62 yo male from [facility], active smoker, with a PMHx of CAD, CHF on nocturnal BIPAP, COPD ( MULTIPLE intubations in past), peripheral neuropathy, GERD, HTN, HLD, obesity, polyarthritis, Pulmonary HTN, TIA, Vit D Def, presents with hypoxemia from nursing facility and lethargy. Patient unable to provide medical history. As per ED, documentation patient had an abrupt onset shortness of breath, generalized weakness, elevated heart rate, hypoxia to the 70s.   Upon chart review, patient was admitted to [facility] ICU in 2022 due to acute on chronic hypoxic hypercapnic respiratory failure. Patient was eventually weaned off ventilator and then discharged home.   (05 Oct 2023 08:23)        History as above, asked to see this patient who presented from a NH SOB, hypoxia , tachycardia and generalized weakness, he is an active smoker and was admitted to the ICU and had to be intubated and is currently vent dependent on a FIO2 of 50% and PEEP of 5, he is not on any pressors but is sedated, he apparently had pus coming out of his mark when it was 1st inserted but the urine is clear now. He has a very positive U/A and his CT chest shows  right lower and middle lung infiltrates. He was found to have fevers here and to have an elevated WBC count also.        PAST MEDICAL & SURGICAL HISTORY:  COPD (chronic obstructive pulmonary disease)  Oxygen 2-3L at home      Stented coronary artery  2017      HLD (hyperlipidemia)      Pulmonary HTN      Type 2 diabetes mellitus without complication, with long-term current use of insulin      DM (diabetes mellitus)      CAD (coronary artery disease)      GERD (gastroesophageal reflux disease)      Insomnia      CHF (congestive heart failure)      HTN (hypertension)      Mood disorder      No significant past surgical history          No Known Allergies      Meds:  albuterol    0.083%. 2.5 milliGRAM(s) Nebulizer once  chlorhexidine 0.12% Liquid 15 milliLiter(s) Oral Mucosa every 12 hours  heparin   Injectable 5000 Unit(s) SubCutaneous every 8 hours  insulin glargine Injectable (LANTUS) 15 Unit(s) SubCutaneous at bedtime  insulin lispro (ADMELOG) corrective regimen sliding scale   SubCutaneous three times a day before meals  meropenem  IVPB 1000 milliGRAM(s) IV Intermittent every 8 hours  meropenem  IVPB      nicotine -   7 mG/24Hr(s) Patch 1 Patch Transdermal daily  propofol Infusion 10.003 MICROgram(s)/kG/Min IV Continuous <Continuous>      SOCIAL HISTORY:  Smoker:  YES   ETOH use:  unknown          FAMILY HISTORY:  No family history of hypertension    No family history of mental disorder    FH: myocardial infarction (Mother)        VITALS:  Vital Signs Last 24 Hrs  T(C): 38 (05 Oct 2023 16:00), Max: 38 (05 Oct 2023 16:00)  T(F): 100.4 (05 Oct 2023 16:00), Max: 100.4 (05 Oct 2023 16:00)  HR: 100 (05 Oct 2023 17:30) (85 - 124)  BP: 95/69 (05 Oct 2023 17:30) (77/63 - 124/77)  BP(mean): 78 (05 Oct 2023 17:30) (69 - 91)  RR: 25 (05 Oct 2023 17:30) (21 - 27)  SpO2: 93% (05 Oct 2023 17:30) (87% - 100%)    Parameters below as of 05 Oct 2023 17:00  Patient On (Oxygen Delivery Method): ventilator        LABS/DIAGNOSTIC TESTS:                          13.6   22.12 )-----------( 186      ( 05 Oct 2023 05:25 )             45.4     WBC Count: 22.12 K/uL (10-05 @ 05:25)      10-    137  |  96  |  24<H>  ----------------------------<  326<H>  5.1   |  37<H>  |  0.90    Ca    9.8      05 Oct 2023 05:25    TPro  8.3  /  Alb  3.8  /  TBili  0.4  /  DBili  x   /  AST  21  /  ALT  23  /  AlkPhos  96  10-05      Urinalysis Basic - ( 05 Oct 2023 08:30 )    Color: Dark Yellow / Appearance: Turbid / S.020 / pH: x  Gluc: x / Ketone: Trace mg/dL  / Bili: Small / Urobili: 0.2 mg/dL   Blood: x / Protein: 300 mg/dL / Nitrite: Negative   Leuk Esterase: Moderate / RBC: 10 /HPF / WBC Too Numerous to count /HPF   Sq Epi: x / Non Sq Epi: x / Bacteria: Too Numerous to count /HPF        LIVER FUNCTIONS - ( 05 Oct 2023 05:25 )  Alb: 3.8 g/dL / Pro: 8.3 g/dL / ALK PHOS: 96 U/L / ALT: 23 U/L DA / AST: 21 U/L / GGT: x             PT/INR - ( 05 Oct 2023 05:25 )   PT: 12.3 sec;   INR: 1.08 ratio         PTT - ( 05 Oct 2023 05:25 )  PTT:33.6 sec    LACTATE:Lactate, Blood: 1.4 mmol/L (10-05 @ 08:15)  Lactate, Blood: 0.8 mmol/L (10-05 @ 06:19)      ABG - ABG - ( 05 Oct 2023 10:41 )  pH, Arterial: 7.39  pH, Blood: x     /  pCO2: 56    /  pO2: 67    / HCO3: 34    / Base Excess: 7.1   /  SaO2: 97                  CULTURES:       RADIOLOGY:< from: Xray Chest 1 View AP/PA (10.05.23 @ 12:36) >    ACC: [id number] EXAM:  XR CHEST AP OR PA 1V   ORDERED BY: CHRISTIANO SEAMAN     PROCEDURE DATE:  10/05/2023          INTERPRETATION:  INDICATION: Postintubation    Portable chest at 10:58 AM comparison 12:11 PM    COMPARISON: 5:20 AM    Lung bases are presently not included. Patient rotated.    FINDINGS:  Examination performed for position of ET tube which is in the mid   trachea. The visualized lung fields are clear. The heart size and   mediastinum cannot be adequately evaluated.    Impression:    ET tube tip mid trachea.    --- End of Report ---             SUJIT MEHTA DO; Attending Radiologist  This document has been electronically signed. Oct  5 2023 12:52PM    < end of copied text >  ----------------------------------------------------------------------------------------------------------------------------------------  ACC: [id number] EXAM:  CT ABDOMEN AND PELVIS IC   ORDERED BY: TERE DAIGLE     ACC: [id number] EXAM:  CT ANGIO CHEST PULM ART [facility]   ORDERED BY: CHRISTIANO SEAMAN     PROCEDURE DATE:  10/05/2023          INTERPRETATION:  CTA CHEST AND CT ABDOMEN AND PELVIS    INDICATION: Admitting Dxs: J96.01 ACUTE RESPIRATORY FAILURE WITH HYPOXIA.   Shortness of breath. Tachycardia. Rule out pulmonary embolism.    TECHNIQUE: Enhanced helical images were obtained of the chest, abdomen   and pelvis. Coronal and sagittalimages were reconstructed. Maximum   intensity projection images were generated.    CONTRAST/COMPLICATIONS:  IV Contrast: Omnipaque 350 (accession [id number]), IV contrast documented   in unlinked concurrent exam (accession [id number])  90 cc administered   10   cc discarded  Oral Contrast: NONE  Complications: None reported at time of study completion      COMPARISON: 3/17/2020 CT chest.    FINDINGS:    PULMONARY ARTERIES: No pulmonary embolism. Main pulmonary artery diameter   3.8 cm.    MEDIASTINUM: Mildly enlarged mediastinal nodes; reference right   paratracheal 2.1 x 1.2 cm node (image 61, series 6). Right hilar   lymphadenopathy; reference 2.4 x 1.2 cm right hilar node (image 78,   series 6).    Qualitatively enlarged right ventricle. Trace pericardial effusion.   Thoracic aorta normal caliber.    AIRWAYS, LUNGS, PLEURA: Satisfactory positioning of endotracheal tube.   Peribronchial thickening and mucus impacted airways of the right middle   lobe and bilateral lower lobes. Peripheral consolidation in the lateral   right middle lobe and right base. Few patchy opacities at the left base.   Emphysema. No pleural effusion.    ABDOMEN and PELVIS: Subcentimeter hepatic hypodense lesions not   adequately characterized, but similar compared to 3/17/2020 CT chest. A 4   cm splenic lesion with Hounsfield units of 39 appears increased in size   compared to 3/17/2020 where it was 1.3 cm. Left adrenal 1.4 cm nodule   unchanged compared to 3/17/2020. Right renal 2 cm hypodense lesion,   likely cyst. Additional right renal subcentimeter lesions too small to   characterize.    Gallbladder, pancreas, right adrenal gland, and left kidney unremarkable.    Mark catheter within decompressed urinary bladder. Few bubbles of gas   within the urinary bladder may be related to Mark catheter insertion.   Unremarkable prostate.    No bowel obstruction.  Colonic diverticulosis. No free fluid. No   abdominal or pelvic lymphadenopathy. Abdominal aorta normal caliber.    BONES: Unremarkable.    SOFTTISSUES: Bilateral gynecomastia.    IMPRESSION:    No pulmonary embolism.    Aspiration and/or infection primarily involving the right middle lobe and   right base.    Mildly enlarged mediastinal nodes and right hilar lymphadenopathy;   indeterminate, but could be reactive. Recommend CT chest follow-up in 3   months to assess stability.    4 cm splenic lesion increased in size compared to 3/17/2020 is   indeterminate. Consider characterization with MRI abdomen.    --- End of Report ---             OSMAR GUPTA MD; Attending Radiologist  This document has been electronically signed. Oct  5 2023 10:45AM    < end of copied text >        ROS  [ x ] UNABLE TO ELICIT

## 2023-10-05 NOTE — CONSULT NOTE ADULT - SUBJECTIVE AND OBJECTIVE BOX
HPI:  Patient is a 62 yo male from [facility], active smoker, with a PMHx of CAD, CHF on nocturnal BIPAP, COPD ( MULTIPLE intubations in past), peripheral neuropathy, GERD, HTN, HLD, obesity, polyarthritis, Pulmonary HTN, TIA, Vit D Def, presents with hypoxemia from nursing facility and lethargy. Patient unable to provide medical history. As per ED, documentation patient had an abrupt onset shortness of breath, generalized weakness, elevated heart rate, hypoxia to the 70s.     Upon chart review, patient was admitted to [facility] ICU in March 2022 due to acute on chronic hypoxic hypercapnic respiratory failure. Patient was eventually weaned off ventilator and then discharged home.   (05 Oct 2023 08:23)      PAST MEDICAL & SURGICAL HISTORY:  COPD (chronic obstructive pulmonary disease)  Oxygen 2-3L at home      Stented coronary artery  2017      HLD (hyperlipidemia)      Pulmonary HTN      Type 2 diabetes mellitus without complication, with long-term current use of insulin      DM (diabetes mellitus)      CAD (coronary artery disease)      GERD (gastroesophageal reflux disease)      Insomnia      CHF (congestive heart failure)      HTN (hypertension)      Mood disorder      ALLERGIES:  No Known Allergies      Social Hx: TOBACCO USE    FAMILY HISTORY:  No family history of hypertension    No family history of mental disorder    FH: myocardial infarction (Mother)        albuterol    0.083%. 2.5 milliGRAM(s) Nebulizer once  chlorhexidine 0.12% Liquid 15 milliLiter(s) Oral Mucosa every 12 hours  heparin   Injectable 5000 Unit(s) SubCutaneous every 8 hours  insulin glargine Injectable (LANTUS) 15 Unit(s) SubCutaneous at bedtime  insulin lispro (ADMELOG) corrective regimen sliding scale   SubCutaneous three times a day before meals  meropenem  IVPB 1000 milliGRAM(s) IV Intermittent every 8 hours  meropenem  IVPB      nicotine -   7 mG/24Hr(s) Patch 1 Patch Transdermal daily  propofol Infusion 10.003 MICROgram(s)/kG/Min IV Continuous <Continuous>      MEDICATIONS  (PRN):      T(C): 37.1 (10-05-23 @ 23:15), Max: 38 (10-05-23 @ 16:00)  HR: 93 (10-05-23 @ 23:30) (85 - 124)  BP: 90/67 (10-05-23 @ 23:30) (77/63 - 124/77)  RR: 20 (10-05-23 @ 23:30) (20 - 27)  SpO2: 95% (10-05-23 @ 23:30) (87% - 100%)        REVIEW OF SYSTEMS:                           ALL ROS DONE [ X   ]    CONSTITUTIONAL:  LETHARGIC [   ], FEVER [   ], UNRESPONSIVE [   ]  CVS:  CP  [   ], SOB, [   ], PALPITATIONS [   ], DIZZYNESS [   ]  RS: COUGH [   ], SPUTUM [   ]  GI: ABDOMINAL PAIN [   ], NAUSEA [   ], VOMITINGS [   ], DIARRHEA [   ], CONSTIPATION [   ]  :  DYSURIA [   ], NOCTURIA [   ], INCREASED FREQUENCY [   ], DRIBLING [   ],  SKELETAL: PAINFUL JOINTS [   ], SWOLLEN JOINTS [   ], NECK ACHE [   ], LOW BACK ACHE [   ],  SKIN : ULCERS [   ], RASH [   ], ITCHING [   ]  CNS: HEAD ACHE [   ], DOUBLE VISION [   ], BLURRED VISION [   ], AMS / CONFUSION [   ], SEIZURES [   ], WEAKNESS [   ],TINGLING / NUMBNESS [   ]    PHYSICAL EXAMINATION:  GENERAL APPEARANCE: NO DISTRESS  HEENT:  NO PALLOR, NO  JVD,  NO   NODES, NECK SUPPLE  CVS: S1 +, S2 +,   RS: AEEB,  OCCASIONAL  RALES +,   NO RONCHI   ;   INTUBATED+  ABD: SOFT, NT, NO, BS +  EXT: NO PE  SKIN: WARM,   SKELETAL:  ROM ACCEPTABLE  CNS:  AAO X 0    RADIOLOGY :    RADIOLOGY AND READINGS REVIEWED    ASSESSMENT :       PLAN:  HPI:  Patient is a 62 yo male from [facility], active smoker, with a PMHx of CAD, CHF on nocturnal BIPAP, COPD ( MULTIPLE intubations in past), peripheral neuropathy, GERD, HTN, HLD, obesity, polyarthritis, Pulmonary HTN, TIA, Vit D Def, presents with hypoxemia from nursing facility and lethargy. Patient unable to provide medical history. As per ED, documentation patient had an abrupt onset shortness of breath, generalized weakness, elevated heart rate, hypoxia to the 70s.     Upon chart review, patient was admitted to [facility] ICU in March 2022 due to acute on chronic hypoxic hypercapnic respiratory failure. Patient was eventually weaned off ventilator and then discharged home.   (05 Oct 2023 08:23)    # ACUTE ON CHRONIC HYPOXIC HYPERCAPNEIC RESPIRATORY FAILURE , ? ASPIRATION PNA  # HX OF COPD  # CHRONIC CHF    - PATIENT IS INTUBATED ON MV  - ON MEROPENEM, F/U BCX  - F/U CTA CHEST - ? RIGHT MIDDLE AND LOWER LOBE INFILTRATE  - ON DUONEB, SOLUMEDROL  - [ON SPIRIVA, SYMBICORT, ALBUTEROL - AT FACILITY]  - F/U ECHO  - CRITICAL CARE MANAGEMENT IN PROGRESS  - IS CONSULT    # SEPSIS S/T UTI + PNA  - ON MEROPENEM, F/U UCX AND BCX  - ID CONSULT    # ACUTE METABOLIC ENCEPHALOPATHY  - NOTED CT HEAD    # HYPERKALEMIA  - GIVEN INSULIN + D50  - F/U REPEAT K  - F/U EKG    # DM - SSI  + FS  # HTN  # HLD  # HX OF CAD  # GERD  # GI AND DVT PPX

## 2023-10-05 NOTE — ED ADULT NURSE NOTE - OBJECTIVE STATEMENT
PT SENT FROM NH FOR DIFFICULTY BREATHING AND O2 SAT 74% RA. + RALES BILATERAL LUNGS. EKG DONE AND PRESENTED TO DR. SEAMAN FOR EVALUATION. HL #20 G INSERTED IN LEFT AC. BLOOD DRAWN AND SENT TO LAB INCLUDING BLOOD CULTURES X 2 AND LACTATE. PLACED ON CARDIAC MONITOR. RHYTHM SINUS TACHY. O2 SAT ON NRM 94%.

## 2023-10-05 NOTE — ED ADULT NURSE NOTE - NSFALLHARMRISKINTERV_ED_ALL_ED

## 2023-10-05 NOTE — PATIENT PROFILE ADULT - HAS THE PATIENT RECEIVED THE INFLUENZA VACCINE THIS SEASON?
"Nutrition Goals  1) Eat 3 meals per day using the 9\" Plate method (1/2 plate non-starchy vegetables/fruit, 1/4 plate lean protein, 1/4 plate whole grain starch - no more than 1/2 cup carb/meal)   - Eat slowly (20-30 minutes per meal), chewing foods well (25 chews per bite/applesauce consistency)   - May use a protein shake or frozen meal meal alternative   2) 1-2 snacks per day. One 2 hours before dinner, and occ one after work   - Plan snacks ahead of time. Healthy choice and portioned out - Seaweed crisps, popcorn, fruit, vegetable.  3) Eliminate half and half from coffee. Try almond or soy milk instead.  4) Try to use meditation after work for stress relief       *Protein Shake Criteria: no more than 210 Calories, at least 20 grams of protein, and less than 10 grams of sugar     Meal Replacement Shake Options:    Health [facility] smoothie (160 Calories, 20 g protein)   Premier Protein (160 Calories, 30 g protein)  Slim Fast Advanced Nutrition (180 Calories, 20 g protein)  Muscle Milk, lactose-free, 17 oz bottle (210 Calories, 30 g protein)  Integrated Supplements, no artificial sugars (110 Calories, 20 g protein)  Genepro, unflavored protein powder (60 Calories, 30 g protein)    Frozen Meal Replacements  Healthy Choice  Lean Cuisine  Atkins Meals  Smart Ones      Follow-up with RD in one month    Rosa Crews RD, LD  If you would like to schedule or reschedule an appointment with the RD, please call [phone]    Interested in working with a health ?  Health coaches work with you to improve your overall health and wellbeing.  They look at the whole person, and may involve discussion of different areas of life, including, but not limited to the four pillars of health (sleep, exercise, nutrition, and stress management). Discuss with your care team if you would like to start working a health .    Health Coaching-3 Pack:    $99 for three health coaching visits    Visits may be done in person or via phone    " Coaching is a partnership between the  and the client; Coaches do not prescribe or diagnose    Coaching helps inspire the client to reach his/her personal goals           unable to assess immunization status...

## 2023-10-05 NOTE — H&P ADULT - NSICDXFAMILYHX_GEN_ALL_CORE_FT
FAMILY HISTORY:  No family history of hypertension  No family history of mental disorder    Mother  Still living? No  FH: myocardial infarction, Age at diagnosis: Age Unknown

## 2023-10-05 NOTE — H&P ADULT - NSICDXPASTMEDICALHX_GEN_ALL_CORE_FT
PAST MEDICAL HISTORY:  CAD (coronary artery disease)     CHF (congestive heart failure)     COPD (chronic obstructive pulmonary disease) Oxygen 2-3L at home    DM (diabetes mellitus)     GERD (gastroesophageal reflux disease)     HLD (hyperlipidemia)     HTN (hypertension)     Insomnia     Mood disorder     Pulmonary HTN     Stented coronary artery 2017    Type 2 diabetes mellitus without complication, with long-term current use of insulin

## 2023-10-05 NOTE — PATIENT PROFILE ADULT - FALL HARM RISK - HARM RISK INTERVENTIONS

## 2023-10-06 LAB
A1C WITH ESTIMATED AVERAGE GLUCOSE RESULT: 11.2 % — HIGH (ref 4–5.6)
ALBUMIN SERPL ELPH-MCNC: 2.8 G/DL — LOW (ref 3.5–5)
ALP SERPL-CCNC: 102 U/L — SIGNIFICANT CHANGE UP (ref 40–120)
ALT FLD-CCNC: 32 U/L DA — SIGNIFICANT CHANGE UP (ref 10–60)
ANION GAP SERPL CALC-SCNC: 6 MMOL/L — SIGNIFICANT CHANGE UP (ref 5–17)
AST SERPL-CCNC: 45 U/L — HIGH (ref 10–40)
BASE EXCESS BLDA CALC-SCNC: 10.2 MMOL/L — HIGH (ref -2–3)
BILIRUB SERPL-MCNC: 0.3 MG/DL — SIGNIFICANT CHANGE UP (ref 0.2–1.2)
BLOOD GAS COMMENTS ARTERIAL: SIGNIFICANT CHANGE UP
BUN SERPL-MCNC: 31 MG/DL — HIGH (ref 7–18)
CALCIUM SERPL-MCNC: 8.4 MG/DL — SIGNIFICANT CHANGE UP (ref 8.4–10.5)
CHLORIDE SERPL-SCNC: 97 MMOL/L — SIGNIFICANT CHANGE UP (ref 96–108)
CO2 SERPL-SCNC: 33 MMOL/L — HIGH (ref 22–31)
CREAT SERPL-MCNC: 0.88 MG/DL — SIGNIFICANT CHANGE UP (ref 0.5–1.3)
EGFR: 98 ML/MIN/1.73M2 — SIGNIFICANT CHANGE UP
ESTIMATED AVERAGE GLUCOSE: 275 MG/DL — HIGH (ref 68–114)
GLUCOSE BLDC GLUCOMTR-MCNC: 239 MG/DL — HIGH (ref 70–99)
GLUCOSE BLDC GLUCOMTR-MCNC: 260 MG/DL — HIGH (ref 70–99)
GLUCOSE BLDC GLUCOMTR-MCNC: 335 MG/DL — HIGH (ref 70–99)
GLUCOSE BLDC GLUCOMTR-MCNC: 337 MG/DL — HIGH (ref 70–99)
GLUCOSE BLDC GLUCOMTR-MCNC: 351 MG/DL — HIGH (ref 70–99)
GLUCOSE SERPL-MCNC: 293 MG/DL — HIGH (ref 70–99)
HCO3 BLDA-SCNC: 36 MMOL/L — HIGH (ref 21–28)
HCT VFR BLD CALC: 37.9 % — LOW (ref 39–50)
HGB BLD-MCNC: 11.9 G/DL — LOW (ref 13–17)
HOROWITZ INDEX BLDA+IHG-RTO: 50 — SIGNIFICANT CHANGE UP
MAGNESIUM SERPL-MCNC: 2.5 MG/DL — SIGNIFICANT CHANGE UP (ref 1.6–2.6)
MCHC RBC-ENTMCNC: 28.3 PG — SIGNIFICANT CHANGE UP (ref 27–34)
MCHC RBC-ENTMCNC: 31.4 GM/DL — LOW (ref 32–36)
MCV RBC AUTO: 90 FL — SIGNIFICANT CHANGE UP (ref 80–100)
NRBC # BLD: 0 /100 WBCS — SIGNIFICANT CHANGE UP (ref 0–0)
PCO2 BLDA: 52 MMHG — HIGH (ref 35–48)
PH BLDA: 7.45 — SIGNIFICANT CHANGE UP (ref 7.35–7.45)
PHOSPHATE SERPL-MCNC: 3.7 MG/DL — SIGNIFICANT CHANGE UP (ref 2.5–4.5)
PLATELET # BLD AUTO: 189 K/UL — SIGNIFICANT CHANGE UP (ref 150–400)
PO2 BLDA: 90 MMHG — SIGNIFICANT CHANGE UP (ref 83–108)
POTASSIUM SERPL-MCNC: 4.7 MMOL/L — SIGNIFICANT CHANGE UP (ref 3.5–5.3)
POTASSIUM SERPL-SCNC: 4.7 MMOL/L — SIGNIFICANT CHANGE UP (ref 3.5–5.3)
PROT SERPL-MCNC: 6.7 G/DL — SIGNIFICANT CHANGE UP (ref 6–8.3)
RBC # BLD: 4.21 M/UL — SIGNIFICANT CHANGE UP (ref 4.2–5.8)
RBC # FLD: 12.6 % — SIGNIFICANT CHANGE UP (ref 10.3–14.5)
SAO2 % BLDA: 98 % — SIGNIFICANT CHANGE UP
SODIUM SERPL-SCNC: 136 MMOL/L — SIGNIFICANT CHANGE UP (ref 135–145)
WBC # BLD: 18.53 K/UL — HIGH (ref 3.8–10.5)
WBC # FLD AUTO: 18.53 K/UL — HIGH (ref 3.8–10.5)

## 2023-10-06 PROCEDURE — 71045 X-RAY EXAM CHEST 1 VIEW: CPT | Mod: 26,77

## 2023-10-06 PROCEDURE — 71045 X-RAY EXAM CHEST 1 VIEW: CPT | Mod: 26

## 2023-10-06 RX ORDER — ALBUTEROL 90 UG/1
1 AEROSOL, METERED ORAL EVERY 6 HOURS
Refills: 0 | Status: DISCONTINUED | OUTPATIENT
Start: 2023-10-06 | End: 2023-10-07

## 2023-10-06 RX ORDER — INSULIN LISPRO 100/ML
VIAL (ML) SUBCUTANEOUS EVERY 6 HOURS
Refills: 0 | Status: DISCONTINUED | OUTPATIENT
Start: 2023-10-06 | End: 2023-10-08

## 2023-10-06 RX ORDER — CHLORHEXIDINE GLUCONATE 213 G/1000ML
15 SOLUTION TOPICAL EVERY 12 HOURS
Refills: 0 | Status: DISCONTINUED | OUTPATIENT
Start: 2023-10-06 | End: 2023-10-09

## 2023-10-06 RX ORDER — FENTANYL CITRATE 50 UG/ML
0.5 INJECTION INTRAVENOUS
Qty: 2500 | Refills: 0 | Status: DISCONTINUED | OUTPATIENT
Start: 2023-10-06 | End: 2023-10-07

## 2023-10-06 RX ORDER — PANTOPRAZOLE SODIUM 20 MG/1
40 TABLET, DELAYED RELEASE ORAL DAILY
Refills: 0 | Status: DISCONTINUED | OUTPATIENT
Start: 2023-10-06 | End: 2023-10-12

## 2023-10-06 RX ORDER — IPRATROPIUM/ALBUTEROL SULFATE 18-103MCG
3 AEROSOL WITH ADAPTER (GRAM) INHALATION EVERY 6 HOURS
Refills: 0 | Status: DISCONTINUED | OUTPATIENT
Start: 2023-10-06 | End: 2023-10-06

## 2023-10-06 RX ORDER — IPRATROPIUM/ALBUTEROL SULFATE 18-103MCG
1 AEROSOL WITH ADAPTER (GRAM) INHALATION
Refills: 0 | Status: DISCONTINUED | OUTPATIENT
Start: 2023-10-06 | End: 2023-10-06

## 2023-10-06 RX ORDER — TAMSULOSIN HYDROCHLORIDE 0.4 MG/1
0.4 CAPSULE ORAL AT BEDTIME
Refills: 0 | Status: DISCONTINUED | OUTPATIENT
Start: 2023-10-06 | End: 2023-10-13

## 2023-10-06 RX ORDER — FENTANYL CITRATE 50 UG/ML
25 INJECTION INTRAVENOUS ONCE
Refills: 0 | Status: DISCONTINUED | OUTPATIENT
Start: 2023-10-06 | End: 2023-10-06

## 2023-10-06 RX ADMIN — Medication 3: at 06:34

## 2023-10-06 RX ADMIN — CHLORHEXIDINE GLUCONATE 15 MILLILITER(S): 213 SOLUTION TOPICAL at 17:14

## 2023-10-06 RX ADMIN — TAMSULOSIN HYDROCHLORIDE 0.4 MILLIGRAM(S): 0.4 CAPSULE ORAL at 21:25

## 2023-10-06 RX ADMIN — FENTANYL CITRATE 25 MICROGRAM(S): 50 INJECTION INTRAVENOUS at 20:10

## 2023-10-06 RX ADMIN — HEPARIN SODIUM 5000 UNIT(S): 5000 INJECTION INTRAVENOUS; SUBCUTANEOUS at 05:29

## 2023-10-06 RX ADMIN — PROPOFOL 6.26 MICROGRAM(S)/KG/MIN: 10 INJECTION, EMULSION INTRAVENOUS at 21:25

## 2023-10-06 RX ADMIN — HEPARIN SODIUM 5000 UNIT(S): 5000 INJECTION INTRAVENOUS; SUBCUTANEOUS at 13:01

## 2023-10-06 RX ADMIN — ALBUTEROL 1 PUFF(S): 90 AEROSOL, METERED ORAL at 21:16

## 2023-10-06 RX ADMIN — FENTANYL CITRATE 25 MICROGRAM(S): 50 INJECTION INTRAVENOUS at 19:52

## 2023-10-06 RX ADMIN — CHLORHEXIDINE GLUCONATE 15 MILLILITER(S): 213 SOLUTION TOPICAL at 05:28

## 2023-10-06 RX ADMIN — MEROPENEM 100 MILLIGRAM(S): 1 INJECTION INTRAVENOUS at 05:25

## 2023-10-06 RX ADMIN — MEROPENEM 100 MILLIGRAM(S): 1 INJECTION INTRAVENOUS at 21:26

## 2023-10-06 RX ADMIN — Medication 40 MILLIGRAM(S): at 21:26

## 2023-10-06 RX ADMIN — INSULIN GLARGINE 15 UNIT(S): 100 INJECTION, SOLUTION SUBCUTANEOUS at 21:25

## 2023-10-06 RX ADMIN — Medication 4: at 16:22

## 2023-10-06 RX ADMIN — Medication 1 PATCH: at 05:41

## 2023-10-06 RX ADMIN — FENTANYL CITRATE 4.68 MICROGRAM(S)/KG/HR: 50 INJECTION INTRAVENOUS at 21:25

## 2023-10-06 RX ADMIN — PROPOFOL 6.26 MICROGRAM(S)/KG/MIN: 10 INJECTION, EMULSION INTRAVENOUS at 02:30

## 2023-10-06 RX ADMIN — PROPOFOL 6.26 MICROGRAM(S)/KG/MIN: 10 INJECTION, EMULSION INTRAVENOUS at 14:29

## 2023-10-06 RX ADMIN — Medication 10: at 22:48

## 2023-10-06 RX ADMIN — HEPARIN SODIUM 5000 UNIT(S): 5000 INJECTION INTRAVENOUS; SUBCUTANEOUS at 21:27

## 2023-10-06 RX ADMIN — PROPOFOL 6.26 MICROGRAM(S)/KG/MIN: 10 INJECTION, EMULSION INTRAVENOUS at 05:25

## 2023-10-06 RX ADMIN — PROPOFOL 6.26 MICROGRAM(S)/KG/MIN: 10 INJECTION, EMULSION INTRAVENOUS at 08:51

## 2023-10-06 RX ADMIN — Medication 40 MILLIGRAM(S): at 11:22

## 2023-10-06 RX ADMIN — PROPOFOL 6.26 MICROGRAM(S)/KG/MIN: 10 INJECTION, EMULSION INTRAVENOUS at 18:18

## 2023-10-06 RX ADMIN — Medication 1 PATCH: at 19:28

## 2023-10-06 RX ADMIN — MEROPENEM 100 MILLIGRAM(S): 1 INJECTION INTRAVENOUS at 14:28

## 2023-10-06 RX ADMIN — PROPOFOL 6.26 MICROGRAM(S)/KG/MIN: 10 INJECTION, EMULSION INTRAVENOUS at 11:41

## 2023-10-06 RX ADMIN — Medication 1 PATCH: at 13:43

## 2023-10-06 RX ADMIN — Medication 2: at 11:09

## 2023-10-06 RX ADMIN — PANTOPRAZOLE SODIUM 40 MILLIGRAM(S): 20 TABLET, DELAYED RELEASE ORAL at 11:09

## 2023-10-06 NOTE — DIETITIAN INITIAL EVALUATION ADULT - PERTINENT MEDS FT
MEDICATIONS  (STANDING):  albuterol    0.083%. 2.5 milliGRAM(s) Nebulizer once  albuterol/ipratropium for Nebulization 3 milliLiter(s) Nebulizer every 6 hours  chlorhexidine 0.12% Liquid 15 milliLiter(s) Oral Mucosa every 12 hours  heparin   Injectable 5000 Unit(s) SubCutaneous every 8 hours  insulin glargine Injectable (LANTUS) 15 Unit(s) SubCutaneous at bedtime  insulin lispro (ADMELOG) corrective regimen sliding scale   SubCutaneous three times a day before meals  meropenem  IVPB 1000 milliGRAM(s) IV Intermittent every 8 hours  meropenem  IVPB      methylPREDNISolone sodium succinate Injectable 40 milliGRAM(s) IV Push every 8 hours  nicotine -   7 mG/24Hr(s) Patch 1 Patch Transdermal daily  pantoprazole  Injectable 40 milliGRAM(s) IV Push daily  propofol Infusion 10.003 MICROgram(s)/kG/Min (6.26 mL/Hr) IV Continuous <Continuous>    MEDICATIONS  (PRN):

## 2023-10-06 NOTE — DIETITIAN INITIAL EVALUATION ADULT - REASON FOR ADMISSION
Acute respiratory failure with hypoxia     [place] Counseling:  I discussed with the patient the risks of imiquimod including but not limited to erythema, scaling, itching, weeping, crusting, and pain.  Patient understands that the inflammatory response to imiquimod is variable from person to person and was educated regarded proper titration schedule.  If flu-like symptoms develop, patient knows to discontinue the medication and contact us.

## 2023-10-06 NOTE — PROGRESS NOTE ADULT - SUBJECTIVE AND OBJECTIVE BOX
Patient is a 63y old  Male who presents with a chief complaint of Acute respiratory failure with hypoxia     (06 Oct 2023 18:01)    PATIENT IS SEEN AND EXAMINED IN ICU.    ALLERGIES:  No Known Allergies      Daily Height in cm: 185.4 (06 Oct 2023 18:01)    Daily Weight in k.9 (06 Oct 2023 08:00)    VITALS:    Vital Signs Last 24 Hrs  T(C): 37.3 (06 Oct 2023 23:53), Max: 37.3 (06 Oct 2023 23:53)  T(F): 99.2 (06 Oct 2023 23:53), Max: 99.2 (06 Oct 2023 23:53)  HR: 78 (06 Oct 2023 23:00) (77 - 102)  BP: 103/72 (06 Oct 2023 23:00) (88/57 - 111/65)  BP(mean): 82 (06 Oct 2023 23:00) (68 - 87)  RR: 18 (06 Oct 2023 23:00) (17 - 37)  SpO2: 94% (06 Oct 2023 23:00) (93% - 99%)    Parameters below as of 06 Oct 2023 18:00  Patient On (Oxygen Delivery Method): ventilator    O2 Concentration (%): 45    LABS:    CBC Full  -  ( 06 Oct 2023 04:15 )  WBC Count : 18.53 K/uL  RBC Count : 4.21 M/uL  Hemoglobin : 11.9 g/dL  Hematocrit : 37.9 %  Platelet Count - Automated : 189 K/uL  Mean Cell Volume : 90.0 fl  Mean Cell Hemoglobin : 28.3 pg  Mean Cell Hemoglobin Concentration : 31.4 gm/dL  Auto Neutrophil # : x  Auto Lymphocyte # : x  Auto Monocyte # : x  Auto Eosinophil # : x  Auto Basophil # : x  Auto Neutrophil % : x  Auto Lymphocyte % : x  Auto Monocyte % : x  Auto Eosinophil % : x  Auto Basophil % : x    PT/INR - ( 05 Oct 2023 05:25 )   PT: 12.3 sec;   INR: 1.08 ratio         PTT - ( 05 Oct 2023 05:25 )  PTT:33.6 sec  10-06    136  |  97  |  31<H>  ----------------------------<  293<H>  4.7   |  33<H>  |  0.88    Ca    8.4      06 Oct 2023 04:15  Phos  3.7     10-06  Mg     2.5     10    TPro  6.7  /  Alb  2.8<L>  /  TBili  0.3  /  DBili  x   /  AST  45<H>  /  ALT  32  /  AlkPhos  102  10-    CAPILLARY BLOOD GLUCOSE      POCT Blood Glucose.: 351 mg/dL (06 Oct 2023 22:40)  POCT Blood Glucose.: 335 mg/dL (06 Oct 2023 21:11)  POCT Blood Glucose.: 337 mg/dL (06 Oct 2023 16:14)  POCT Blood Glucose.: 239 mg/dL (06 Oct 2023 11:05)  POCT Blood Glucose.: 260 mg/dL (06 Oct 2023 06:32)        LIVER FUNCTIONS - ( 06 Oct 2023 04:15 )  Alb: 2.8 g/dL / Pro: 6.7 g/dL / ALK PHOS: 102 U/L / ALT: 32 U/L DA / AST: 45 U/L / GGT: x           Creatinine Trend: 0.88<--, 0.90<--  I&O's Summary    05 Oct 2023 07:01  -  06 Oct 2023 07:00  --------------------------------------------------------  IN: 1371.7 mL / OUT: 3660 mL / NET: -2288.3 mL    06 Oct 2023 07:01  -  06 Oct 2023 23:57  --------------------------------------------------------  IN: 474.8 mL / OUT: 770 mL / NET: -295.2 mL        ABG - ( 06 Oct 2023 03:34 )  pH, Arterial: 7.45  pH, Blood: x     /  pCO2: 52    /  pO2: 90    / HCO3: 36    / Base Excess: 10.2  /  SaO2: 98                  Clean Catch Clean Catch (Midstream)  10-05 @ 08:30   Culture in progress  --  --      .Blood Blood-Peripheral  10-05 @ 08:15   No growth at 24 hours  --  --      .Blood Blood-Peripheral  10- @ 08:00   No growth at 24 hours  --  --      .Blood Blood-Peripheral  10- @ 05:35   No growth at 24 hours  --  --      .Blood Blood-Peripheral  10- @ 05:25   No growth at 24 hours  --  --          MEDICATIONS:    MEDICATIONS  (STANDING):  albuterol    90 MICROgram(s) HFA Inhaler 1 Puff(s) Inhalation every 6 hours  chlorhexidine 0.12% Liquid 15 milliLiter(s) Oral Mucosa every 12 hours  fentaNYL   Infusion. 0.5 MICROgram(s)/kG/Hr (4.68 mL/Hr) IV Continuous <Continuous>  heparin   Injectable 5000 Unit(s) SubCutaneous every 8 hours  insulin glargine Injectable (LANTUS) 15 Unit(s) SubCutaneous at bedtime  insulin lispro (ADMELOG) corrective regimen sliding scale   SubCutaneous every 6 hours  meropenem  IVPB 1000 milliGRAM(s) IV Intermittent every 8 hours  meropenem  IVPB      methylPREDNISolone sodium succinate Injectable 40 milliGRAM(s) IV Push every 8 hours  nicotine -   7 mG/24Hr(s) Patch 1 Patch Transdermal daily  pantoprazole  Injectable 40 milliGRAM(s) IV Push daily  propofol Infusion 10.003 MICROgram(s)/kG/Min (6.26 mL/Hr) IV Continuous <Continuous>  tamsulosin 0.4 milliGRAM(s) Oral at bedtime      MEDICATIONS  (PRN):      REVIEW OF SYSTEMS:                           ALL ROS DONE [ X   ]    CONSTITUTIONAL:  LETHARGIC [   ], FEVER [   ], UNRESPONSIVE [   ]  CVS:  CP  [   ], SOB, [   ], PALPITATIONS [   ], DIZZYNESS [   ]  RS: COUGH [   ], SPUTUM [   ]  GI: ABDOMINAL PAIN [   ], NAUSEA [   ], VOMITINGS [   ], DIARRHEA [   ], CONSTIPATION [   ]  :  DYSURIA [   ], NOCTURIA [   ], INCREASED FREQUENCY [   ], DRIBLING [   ],  SKELETAL: PAINFUL JOINTS [   ], SWOLLEN JOINTS [   ], NECK ACHE [   ], LOW BACK ACHE [   ],  SKIN : ULCERS [   ], RASH [   ], ITCHING [   ]  CNS: HEAD ACHE [   ], DOUBLE VISION [   ], BLURRED VISION [   ], AMS / CONFUSION [   ], SEIZURES [   ], WEAKNESS [   ],TINGLING / NUMBNESS [   ]    PHYSICAL EXAMINATION:  GENERAL APPEARANCE: NO DISTRESS  HEENT:  NO PALLOR, NO  JVD,  NO   NODES, NECK SUPPLE  CVS: S1 +, S2 +,   RS: AEEB,  OCCASIONAL  RALES +,   NO RONCHI   ;   INTUBATED+  ABD: SOFT, NT, NO, BS +  EXT: NO PE  SKIN: WARM,   SKELETAL:  ROM ACCEPTABLE  CNS:  AAO X 0    RADIOLOGY :    RADIOLOGY AND READINGS REVIEWED    ASSESSMENT :       PLAN:  HPI:  Patient is a 62 yo male from [facility], active smoker, with a PMHx of CAD, CHF on nocturnal BIPAP, COPD ( MULTIPLE intubations in past), peripheral neuropathy, GERD, HTN, HLD, obesity, polyarthritis, Pulmonary HTN, TIA, Vit D Def, presents with hypoxemia from nursing facility and lethargy. Patient unable to provide medical history. As per ED, documentation patient had an abrupt onset shortness of breath, generalized weakness, elevated heart rate, hypoxia to the 70s.     Upon chart review, patient was admitted to [facility] ICU in 2022 due to acute on chronic hypoxic hypercapnic respiratory failure. Patient was eventually weaned off ventilator and then discharged home.   (05 Oct 2023 08:23)    # ACUTE ON CHRONIC HYPOXIC HYPERCAPNEIC RESPIRATORY FAILURE , ? ASPIRATION PNA  # HX OF COPD  # CHRONIC CHF    - PATIENT IS INTUBATED ON MV  - ON MEROPENEM, F/U BCX  - F/U CTA CHEST - ? RIGHT MIDDLE AND LOWER LOBE INFILTRATE  - ON DUONEB, SOLUMEDROL  - [ON SPIRIVA, SYMBICORT, ALBUTEROL - AT FACILITY]  - F/U ECHO  - CRITICAL CARE MANAGEMENT IN PROGRESS  - IS CONSULT    # SEPSIS S/T UTI + PNA  - ON MEROPENEM, F/U UCX AND BCX  - ID CONSULT    # ACUTE METABOLIC ENCEPHALOPATHY  - NOTED CT HEAD    # HYPERKALEMIA - IMPROVED  - S/P INSULIN + D50    # DM - LANTUS, SSI  + FS  # HTN  # HLD  # HX OF CAD  # GERD  # GI AND DVT PPX

## 2023-10-06 NOTE — DIETITIAN INITIAL EVALUATION ADULT - ADD RECOMMEND
(Vent/ Propofol). A1c 11.2. With Propofol @28.1 ml/hr: Glucerna 1.5 35x24 (840 ml, 1260 kcals, 69 gm protein). Add 1 Pkt Prosource BID (+30 gm protein, 120 kcals). MD to monitor. RD available.

## 2023-10-06 NOTE — DIETITIAN INITIAL EVALUATION ADULT - OTHER INFO
Pt seen, earlier, Propofol @28.1 ml/hr (if x24 hrs= 742 kcals fat). Noted w/ acute on chronic hypercapnic respiratory failure (hx multiple intubations in past), uncontrolled blood sugar

## 2023-10-06 NOTE — PROGRESS NOTE ADULT - ASSESSMENT
62 yo male from [facility], active smoker, with a PMHx of CAD, CHF on nocturnal BIPAP, COPD ( MULTIPLE intubations in past), peripheral neuropathy, GERD, HTN, HLD, obesity, polyarthritis, Pulmonary HTN, TIA, Vit D Def, presents with hypoxemia from nursing facility and lethargy and altered mental status. Upon evaluation in ED, patient afebrile, bp noted to be 124/77, tachycardic to 124bpm, and noted to be hypoxic on subsequently intubated. Physical examination noted for course lung examination, mark catheter placed and immediately draining 1.5L of pyuria. Labs significant for ABG 7.19/105/230/40,  potassium noted to be 6.2,  utox positive for benzo, wbc noted to be 22 and lactate normal. UA showing findings concerning for UTI, blood cultures pending. CT head, CTA chest and abdomen pending. Patient admitted to ICU for Acute on chronic hypoxic hypercapnic respiratory failure requiring emergent intubation.    #Acute encephalopathy   #Acute on chronic hypoxic hypercapnic respiratory failure  #Sepsis 2/2 UTI  #Hyperkalemia   # Hx of COPD  #Hx of DM   # Chronic CHF   # Hx of CAD  # HTN  # HLD  # GERD     =======Neuro======  10/5 Intubated     # Acute encephalopathy  Likely  Acute on chronic CO2 retention vs Sepsis 2/2 UTI vs CVA vs electrolyte abnormalities vs Urinary retention   p/w  lethargic upon arrival  noted to have C02 level of 105, and noted to have UTI  U-tox noted for benzos   s/p Azithromycin, ceftriaxone   CT Head: No hemorrhage  CT chest: No PE, Aspiration or infection involving the RT middle lobe and RT base.   10/5 Started meropenem 1000, solumedrol   Bcx Neg at 24hrs  - titrate vent  - c/w sedation with propofol   - c/w meropenem, solumedrol  - f/u urine cx      =======Pulm=======  # Acute on chronic hypoxic hypercapnic respiratory failure   noted to have hx of COPD on Nocturnal bipap  patient intubated in ED, due to lethargy and hypoxemia   CT chest: No PE, Aspiration or infection involving the RT middle lobe and RT base.   CXR: Right peripheral lower lung opacity, possibly infiltrate.  10/5 Started meropenem 1000, solumedrol  Repeat CO2 levels 105>56>46>52  - c/w home nebulizers through vent   - monitor CO2 levels  - c/w meropenem, solumedrol     #Hx of COPD  patient noted to be on symbicort, albuterol and spiriva at facility  - Resume meds nebs via vent   - monitor CO2 levels  - titrate ventilator as tolerated     =======Cardiac=======  # HX of CAD   patient noted to be on ASA at home   Troponin  neg  EKG: RBBB    #HTN   Pt has h/o HTN  hold home anti hypertensives for now     #Chronic CHF   patient noted to have hx of Chronic CHF  last echo noted to show EF>70%  will resume home medications      =======Renal=======  # no acute issues     =======ID=======  #Sepsis  Leukocytosis 22k  UA: (+) Bacteria, WBC, Leuk.... prior culture growing enterococcus  10/5 started meropenem  Consulted Dr. Tsai  Bcx: Neg at 24hrs  - f/u urine cx    =======Heme/Onc=======  # no acute issues    =======GI=======  #no acute issue   - NPO    =======Endo=======   #Hyperkalemia  noted to have K of 6.2  s/p 5 units of insulin and dextrose   repeat K 4.7    #HX of DM  noted to be on lantus 30 units QHS  - on SSI  - q6hrs finger sticks  - c/w lantus 15    =======Skin/cath=======  # no acute issues    =======Prophylaxis======  heparin    ========Lines========  - peripheral IV lines      =======Dispo=======     64 yo male from [facility], active smoker, with a PMHx of CAD, CHF on nocturnal BIPAP, COPD ( MULTIPLE intubations in past), peripheral neuropathy, GERD, HTN, HLD, obesity, polyarthritis, Pulmonary HTN, TIA, Vit D Def, presents with hypoxemia from nursing facility and lethargy and altered mental status. Upon evaluation in ED, patient afebrile, bp noted to be 124/77, tachycardic to 124bpm, and noted to be hypoxic on subsequently intubated. Physical examination noted for course lung examination, mark catheter placed and immediately draining 1.5L of pyuria. Labs significant for ABG 7.19/105/230/40,  potassium noted to be 6.2,  utox positive for benzo, wbc noted to be 22 and lactate normal. UA showing findings concerning for UTI, blood cultures pending. CT head, CTA chest and abdomen pending. Patient admitted to ICU for Acute on chronic hypoxic hypercapnic respiratory failure requiring emergent intubation.    #Acute encephalopathy   #Acute on chronic hypoxic hypercapnic respiratory failure  #Sepsis 2/2 UTI  #Hyperkalemia   # Hx of COPD  #Hx of DM   # Chronic CHF   # Hx of CAD  # HTN  # HLD  # GERD     =======Neuro======  10/5 Intubated     # Acute encephalopathy  Likely  Acute on chronic CO2 retention vs Sepsis 2/2 UTI vs CVA vs electrolyte abnormalities vs Urinary retention   p/w  lethargic upon arrival  noted to have C02 level of 105, and noted to have UTI  U-tox noted for benzos   s/p Azithromycin, ceftriaxone   CT Head: No hemorrhage  CT chest: No PE, Aspiration or infection involving the RT middle lobe and RT base.   10/5 Started meropenem 1000, solumedrol   Bcx Neg at 24hrs  - titrate vent  - c/w sedation with propofol   - c/w meropenem, solumedrol  - f/u urine cx      =======Pulm=======  # Acute on chronic hypoxic hypercapnic respiratory failure   noted to have hx of COPD on Nocturnal bipap  patient intubated in ED, due to lethargy and hypoxemia   CT chest: No PE, Aspiration or infection involving the RT middle lobe and RT base.   CXR: Right peripheral lower lung opacity, possibly infiltrate.  10/5 Started meropenem 1000, solumedrol  Repeat CO2 levels 105>56>46>52  - c/w home nebulizers through vent   - monitor CO2 levels  - c/w meropenem, solumedrol     #Hx of COPD  patient noted to be on symbicort, albuterol and spiriva at facility  - Resume meds nebs via vent   - monitor CO2 levels  - titrate ventilator as tolerated     =======Cardiac=======  # HX of CAD   patient noted to be on ASA at home   Troponin  neg  EKG: RBBB  - f/u lipid profile   - f/u CKMB, CK, Troponin      #HTN   Pt has h/o HTN  hold home anti hypertensives for now     #Chronic CHF   patient noted to have hx of Chronic CHF  last echo noted to show EF>70%      =======Renal=======  # no acute issues     =======ID=======  #Sepsis  Leukocytosis 22k  UA: (+) Bacteria, WBC, Leuk.... prior culture growing enterococcus  10/5 started meropenem  Consulted Dr. Tsai  Bcx: Neg at 24hrs  - f/u urine cx    =======Heme/Onc=======  # no acute issues    =======GI=======  #no acute issue   - NPO    =======Endo=======   #Hyperkalemia  noted to have K of 6.2  s/p 5 units of insulin and dextrose   repeat K 4.7    #HX of DM  noted to be on lantus 30 units QHS  - on SSI  - q6hrs finger sticks  - c/w lantus 15    =======Skin/cath=======  # no acute issues    =======Prophylaxis======  DVT: heparin    ========Lines========  - peripheral IV lines      =======Dispo=======     62 yo male from [facility], active smoker, with a PMHx of CAD, CHF on nocturnal BIPAP, COPD ( MULTIPLE intubations in past), peripheral neuropathy, GERD, HTN, HLD, obesity, polyarthritis, Pulmonary HTN, TIA, Vit D Def, presents with hypoxemia from nursing facility and lethargy and altered mental status. Upon evaluation in ED, patient afebrile, bp noted to be 124/77, tachycardic to 124bpm, and noted to be hypoxic on subsequently intubated. Physical examination noted for course lung examination, mark catheter placed and immediately draining 1.5L of pyuria. Labs significant for ABG 7.19/105/230/40,  potassium noted to be 6.2,  utox positive for benzo, wbc noted to be 22 and lactate normal. UA showing findings concerning for UTI, blood cultures pending. CT head, CTA chest and abdomen pending. Patient admitted to ICU for Acute on chronic hypoxic hypercapnic respiratory failure requiring emergent intubation.    #Acute encephalopathy   #Acute on chronic hypoxic hypercapnic respiratory failure  #Sepsis 2/2 UTI  #Hyperkalemia   # Hx of COPD  #Hx of DM   # Chronic CHF   # Hx of CAD  # HTN  # HLD  # GERD     =======Neuro======  10/5 Intubated     # Acute encephalopathy  Likely  Acute on chronic CO2 retention vs Sepsis 2/2 UTI vs CVA vs electrolyte abnormalities vs Urinary retention   p/w  lethargic upon arrival  noted to have C02 level of 105, and noted to have UTI  U-tox noted for benzos   s/p Azithromycin, ceftriaxone   CT Head: No hemorrhage  CT chest: No PE, Aspiration or infection involving the RT middle lobe and RT base.   10/5 Started meropenem 1000, solumedrol   Bcx Neg at 24hrs  - titrate vent  - c/w sedation with propofol   - c/w meropenem, solumedrol  - f/u urine cx      =======Pulm=======  # Acute on chronic hypoxic hypercapnic respiratory failure   noted to have hx of COPD on Nocturnal bipap  patient intubated in ED, due to lethargy and hypoxemia   CT chest: No PE, Aspiration or infection involving the RT middle lobe and RT base.   CXR: Right peripheral lower lung opacity, possibly infiltrate.  10/5 Started meropenem 1000, solumedrol  Repeat CO2 levels 105>56>46>52  - c/w home nebulizers through vent   - monitor CO2 levels  - c/w meropenem, solumedrol     #Hx of COPD  patient noted to be on symbicort, albuterol and spiriva at facility  - Resume meds nebs via vent   - monitor CO2 levels  - titrate ventilator as tolerated     =======Cardiac=======  # HX of CAD   patient noted to be on ASA at home   Troponin  neg  EKG: RBBB  - f/u lipid profile   - f/u CKMB, CK, Troponin      #HTN   Pt has h/o HTN  hold home anti hypertensives for now     #Chronic CHF   patient noted to have hx of Chronic CHF  last echo noted to show EF>70%      =======Renal=======  # no acute issues     =======ID=======  #Sepsis  Leukocytosis 22k  UA: (+) Bacteria, WBC, Leuk.... prior culture growing enterococcus  10/5 started meropenem  Consulted Dr. Tsai  Bcx: Neg at 24hrs  - f/u urine cx    =======Heme/Onc=======  # no acute issues    =======GI=======  #no acute issue   - NPO    =======Endo=======   #Hyperkalemia  noted to have K of 6.2  s/p 5 units of insulin and dextrose   repeat K 4.7  RESOLVED    #HX of DM  noted to be on lantus 30 units QHS  - on SSI  - q6hrs finger sticks  - c/w lantus 15    =======Skin/cath=======  # no acute issues    =======Prophylaxis======  DVT: heparin    ========Lines========  - peripheral IV lines    =======Dispo=======

## 2023-10-06 NOTE — PROGRESS NOTE ADULT - SUBJECTIVE AND OBJECTIVE BOX
Patient is a 63y old  Male who presents with a chief complaint of Acute on chronic hypoxemic hypercapnic respiratory failure (06 Oct 2023 07:56)      INTERVAL HPI/OVERNIGHT EVENTS: No overnight events. Pt was examined at bedside and he is in NAD.     REVIEW OF SYSTEMS:  CONSTITUTIONAL: No fever, chills  RESPIRATORY: No cough, wheezing, shortness of breath  CARDIOVASCULAR: No chest pain, palpitations,   GASTROINTESTINAL: No abdominal pain, nausea, vomiting, diarrhea or constipation, bloody stool  GENITOURINARY: No dysuria,  hematuria, urinary frequency  NEUROLOGICAL: No headaches, numbness, or tremors  EXTREMITES: No leg swelling  SKIN: No itching, burning, rashes, or lesions     ICU Vital Signs Last 24 Hrs  T(C): 36.9 (06 Oct 2023 08:00), Max: 38 (05 Oct 2023 16:00)  T(F): 98.4 (06 Oct 2023 08:00), Max: 100.4 (05 Oct 2023 16:00)  HR: 90 (06 Oct 2023 12:00) (85 - 106)  BP: 92/57 (06 Oct 2023 10:30) (81/63 - 111/65)  BP(mean): 70 (06 Oct 2023 10:30) (69 - 88)  ABP: --  ABP(mean): --  RR: 19 (06 Oct 2023 10:30) (17 - 28)  SpO2: 94% (06 Oct 2023 12:00) (92% - 99%)    O2 Parameters below as of 06 Oct 2023 10:00  Patient On (Oxygen Delivery Method): ventilator    O2 Concentration (%): 50      I&O's Summary    05 Oct 2023 07:01  -  06 Oct 2023 07:00  --------------------------------------------------------  IN: 1371.7 mL / OUT: 3660 mL / NET: -2288.3 mL    06 Oct 2023 07:01  -  06 Oct 2023 12:56  --------------------------------------------------------  IN: 56.2 mL / OUT: 120 mL / NET: -63.8 mL      Mode: AC/ CMV (Assist Control/ Continuous Mandatory Ventilation)  RR (machine): 18  TV (machine): 450  FiO2: 50  PEEP: 5  ITime: 1  MAP: 12  PIP: 35          PRESSORS: [ ] YES [x ] NO    Antimicrobial:  meropenem  IVPB 1000 milliGRAM(s) IV Intermittent every 8 hours  meropenem  IVPB        Cardiovascular:    Pulmonary:  albuterol    0.083%. 2.5 milliGRAM(s) Nebulizer once  albuterol/ipratropium (CFC free) Inhaler. 1 Puff(s) Inhalation four times a day    Hematalogic:  heparin   Injectable 5000 Unit(s) SubCutaneous every 8 hours    Other:  chlorhexidine 0.12% Liquid 15 milliLiter(s) Oral Mucosa every 12 hours  insulin glargine Injectable (LANTUS) 15 Unit(s) SubCutaneous at bedtime  insulin lispro (ADMELOG) corrective regimen sliding scale   SubCutaneous three times a day before meals  methylPREDNISolone sodium succinate Injectable 40 milliGRAM(s) IV Push every 8 hours  nicotine -   7 mG/24Hr(s) Patch 1 Patch Transdermal daily  pantoprazole  Injectable 40 milliGRAM(s) IV Push daily  propofol Infusion 10.003 MICROgram(s)/kG/Min IV Continuous <Continuous>    albuterol    0.083%. 2.5 milliGRAM(s) Nebulizer once  albuterol/ipratropium (CFC free) Inhaler. 1 Puff(s) Inhalation four times a day  chlorhexidine 0.12% Liquid 15 milliLiter(s) Oral Mucosa every 12 hours  heparin   Injectable 5000 Unit(s) SubCutaneous every 8 hours  insulin glargine Injectable (LANTUS) 15 Unit(s) SubCutaneous at bedtime  insulin lispro (ADMELOG) corrective regimen sliding scale   SubCutaneous three times a day before meals  meropenem  IVPB 1000 milliGRAM(s) IV Intermittent every 8 hours  meropenem  IVPB      methylPREDNISolone sodium succinate Injectable 40 milliGRAM(s) IV Push every 8 hours  nicotine -   7 mG/24Hr(s) Patch 1 Patch Transdermal daily  pantoprazole  Injectable 40 milliGRAM(s) IV Push daily  propofol Infusion 10.003 MICROgram(s)/kG/Min IV Continuous <Continuous>    Drug Dosing Weight  Height (cm): 185.4 (05 Oct 2023 04:18)  Weight (kg): 93.5 (05 Oct 2023 10:30)  BMI (kg/m2): 27.2 (05 Oct 2023 10:30)  BSA (m2): 2.18 (05 Oct 2023 10:30)    AMOR: [ x] YES [ ] NO    DATE INSERTED: 10/5/23  REMOVE:  [ ] YES [ ] NO  EXPLAIN:    CENTRAL LINE: [ ] YES [x ] NO  LOCATION:   DATE INSERTED:  REMOVE: [ ] YES [ ] NO  EXPLAIN:    A-LINE:  [ ] YES [x ] NO  LOCATION:   DATE INSERTED:  REMOVE:  [ ] YES [ ] NO  EXPLAIN:    PMH -reviewed admission note, no change since admission  PAST MEDICAL & SURGICAL HISTORY:  COPD (chronic obstructive pulmonary disease)  Oxygen 2-3L at home      Stented coronary artery  2017      HLD (hyperlipidemia)      Pulmonary HTN      Type 2 diabetes mellitus without complication, with long-term current use of insulin      DM (diabetes mellitus)      CAD (coronary artery disease)      GERD (gastroesophageal reflux disease)      Insomnia      CHF (congestive heart failure)      HTN (hypertension)      Mood disorder      No significant past surgical history              PHYSICAL EXAM:  GENERAL: NAD,   HEAD:  Atraumatic, Normocephalic  EYES: EOMI, PERRLA, conjunctiva and sclera clear  ENMT: Moist mucous membranes, No lesions  NECK: Supple, normal appearance, No JVD   NERVOUS SYSTEM:  Alert & Oriented X3, No Asterixis, 5/5 strength LUE and LLE, 4/5 strength RUE and LUE, sensation intact, CN ll - Vll intact   CHEST/LUNG: No chest deformity; No rales, rhonchi, wheezing   HEART: Regular rate and rhythm; No murmurs,  ABDOMEN: Soft, Nontender, Nondistended; Bowel sounds present  EXTREMITIES:  2+ Peripheral Pulses, No clubbing, cyanosis, or edema  SKIN: (+) peripheral IV cath noted on B/L arm No rashes or lesions;      LABS:  CBC Full  -  ( 06 Oct 2023 04:15 )  WBC Count : 18.53 K/uL  RBC Count : 4.21 M/uL  Hemoglobin : 11.9 g/dL  Hematocrit : 37.9 %  Platelet Count - Automated : 189 K/uL  Mean Cell Volume : 90.0 fl  Mean Cell Hemoglobin : 28.3 pg  Mean Cell Hemoglobin Concentration : 31.4 gm/dL  Auto Neutrophil # : x  Auto Lymphocyte # : x  Auto Monocyte # : x  Auto Eosinophil # : x  Auto Basophil # : x  Auto Neutrophil % : x  Auto Lymphocyte % : x  Auto Monocyte % : x  Auto Eosinophil % : x  Auto Basophil % : x    10-06    136  |  97  |  31<H>  ----------------------------<  293<H>  4.7   |  33<H>  |  0.88    Ca    8.4      06 Oct 2023 04:15  Phos  3.7     10-06  Mg     2.5     10-06    TPro  6.7  /  Alb  2.8<L>  /  TBili  0.3  /  DBili  x   /  AST  45<H>  /  ALT  32  /  AlkPhos  102  10-06    PT/INR - ( 05 Oct 2023 05:25 )   PT: 12.3 sec;   INR: 1.08 ratio         PTT - ( 05 Oct 2023 05:25 )  PTT:33.6 sec  Urinalysis Basic - ( 06 Oct 2023 04:15 )    Color: x / Appearance: x / SG: x / pH: x  Gluc: 293 mg/dL / Ketone: x  / Bili: x / Urobili: x   Blood: x / Protein: x / Nitrite: x   Leuk Esterase: x / RBC: x / WBC x   Sq Epi: x / Non Sq Epi: x / Bacteria: x          RADIOLOGY & ADDITIONAL STUDIES REVIEWED:  ***    [ ]GOALS OF CARE DISCUSSION WITH PATIENT/FAMILY/PROXY:    CRITICAL CARE TIME SPENT: 35 minutes Patient is a 63y old  Male who presents with a chief complaint of Acute on chronic hypoxemic hypercapnic respiratory failure (06 Oct 2023 07:56)      INTERVAL HPI/OVERNIGHT EVENTS: No overnight events. Pt was examined at bedside and he is in NAD. Pt is sedated and intubated      REVIEW OF SYSTEMS: Pt is sedated and intubated     ICU Vital Signs Last 24 Hrs  T(C): 36.9 (06 Oct 2023 08:00), Max: 38 (05 Oct 2023 16:00)  T(F): 98.4 (06 Oct 2023 08:00), Max: 100.4 (05 Oct 2023 16:00)  HR: 90 (06 Oct 2023 12:00) (85 - 106)  BP: 92/57 (06 Oct 2023 10:30) (81/63 - 111/65)  BP(mean): 70 (06 Oct 2023 10:30) (69 - 88)  ABP: --  ABP(mean): --  RR: 19 (06 Oct 2023 10:30) (17 - 28)  SpO2: 94% (06 Oct 2023 12:00) (92% - 99%)    O2 Parameters below as of 06 Oct 2023 10:00  Patient On (Oxygen Delivery Method): ventilator    O2 Concentration (%): 50      I&O's Summary    05 Oct 2023 07:01  -  06 Oct 2023 07:00  --------------------------------------------------------  IN: 1371.7 mL / OUT: 3660 mL / NET: -2288.3 mL    06 Oct 2023 07:01  -  06 Oct 2023 12:56  --------------------------------------------------------  IN: 56.2 mL / OUT: 120 mL / NET: -63.8 mL      Mode: AC/ CMV (Assist Control/ Continuous Mandatory Ventilation)  RR (machine): 18  TV (machine): 450  FiO2: 50  PEEP: 5  ITime: 1  MAP: 12  PIP: 35          PRESSORS: [ ] YES [x ] NO    Antimicrobial:  meropenem  IVPB 1000 milliGRAM(s) IV Intermittent every 8 hours  meropenem  IVPB        Cardiovascular:    Pulmonary:  albuterol    0.083%. 2.5 milliGRAM(s) Nebulizer once  albuterol/ipratropium (CFC free) Inhaler. 1 Puff(s) Inhalation four times a day    Hematalogic:  heparin   Injectable 5000 Unit(s) SubCutaneous every 8 hours    Other:  chlorhexidine 0.12% Liquid 15 milliLiter(s) Oral Mucosa every 12 hours  insulin glargine Injectable (LANTUS) 15 Unit(s) SubCutaneous at bedtime  insulin lispro (ADMELOG) corrective regimen sliding scale   SubCutaneous three times a day before meals  methylPREDNISolone sodium succinate Injectable 40 milliGRAM(s) IV Push every 8 hours  nicotine -   7 mG/24Hr(s) Patch 1 Patch Transdermal daily  pantoprazole  Injectable 40 milliGRAM(s) IV Push daily  propofol Infusion 10.003 MICROgram(s)/kG/Min IV Continuous <Continuous>    albuterol    0.083%. 2.5 milliGRAM(s) Nebulizer once  albuterol/ipratropium (CFC free) Inhaler. 1 Puff(s) Inhalation four times a day  chlorhexidine 0.12% Liquid 15 milliLiter(s) Oral Mucosa every 12 hours  heparin   Injectable 5000 Unit(s) SubCutaneous every 8 hours  insulin glargine Injectable (LANTUS) 15 Unit(s) SubCutaneous at bedtime  insulin lispro (ADMELOG) corrective regimen sliding scale   SubCutaneous three times a day before meals  meropenem  IVPB 1000 milliGRAM(s) IV Intermittent every 8 hours  meropenem  IVPB      methylPREDNISolone sodium succinate Injectable 40 milliGRAM(s) IV Push every 8 hours  nicotine -   7 mG/24Hr(s) Patch 1 Patch Transdermal daily  pantoprazole  Injectable 40 milliGRAM(s) IV Push daily  propofol Infusion 10.003 MICROgram(s)/kG/Min IV Continuous <Continuous>    Drug Dosing Weight  Height (cm): 185.4 (05 Oct 2023 04:18)  Weight (kg): 93.5 (05 Oct 2023 10:30)  BMI (kg/m2): 27.2 (05 Oct 2023 10:30)  BSA (m2): 2.18 (05 Oct 2023 10:30)    AMOR: [ x] YES [ ] NO    DATE INSERTED: 10/5/23  REMOVE:  [ ] YES [ ] NO  EXPLAIN:    CENTRAL LINE: [ ] YES [x ] NO  LOCATION:   DATE INSERTED:  REMOVE: [ ] YES [ ] NO  EXPLAIN:    A-LINE:  [ ] YES [x ] NO  LOCATION:   DATE INSERTED:  REMOVE:  [ ] YES [ ] NO  EXPLAIN:    PMH -reviewed admission note, no change since admission  PAST MEDICAL & SURGICAL HISTORY:  COPD (chronic obstructive pulmonary disease)  Oxygen 2-3L at home      Stented coronary artery  2017      HLD (hyperlipidemia)      Pulmonary HTN      Type 2 diabetes mellitus without complication, with long-term current use of insulin      DM (diabetes mellitus)      CAD (coronary artery disease)      GERD (gastroesophageal reflux disease)      Insomnia      CHF (congestive heart failure)      HTN (hypertension)      Mood disorder      No significant past surgical history              PHYSICAL EXAM:  GENERAL: NAD, Pt is sedated and intubated   HEAD:  Atraumatic, Normocephalic  EYES:  PERRLA, conjunctiva and sclera clear  ENMT: No lesions. Pt is intubated   NECK:  normal appearance, No JVD   NERVOUS SYSTEM:  Pt is sedated   CHEST/LUNG: No chest deformity; No rales, rhonchi, wheezing   HEART: Regular rate and rhythm; No murmurs,  ABDOMEN: Soft, Nontender, Nondistended; Bowel sounds present  EXTREMITIES:  2+ Peripheral Pulses, No clubbing, cyanosis, or edema  SKIN: (+) peripheral IV cath noted on B/L arm. No rashes or lesions;      LABS:  CBC Full  -  ( 06 Oct 2023 04:15 )  WBC Count : 18.53 K/uL  RBC Count : 4.21 M/uL  Hemoglobin : 11.9 g/dL  Hematocrit : 37.9 %  Platelet Count - Automated : 189 K/uL  Mean Cell Volume : 90.0 fl  Mean Cell Hemoglobin : 28.3 pg  Mean Cell Hemoglobin Concentration : 31.4 gm/dL  Auto Neutrophil # : x  Auto Lymphocyte # : x  Auto Monocyte # : x  Auto Eosinophil # : x  Auto Basophil # : x  Auto Neutrophil % : x  Auto Lymphocyte % : x  Auto Monocyte % : x  Auto Eosinophil % : x  Auto Basophil % : x    10-06    136  |  97  |  31<H>  ----------------------------<  293<H>  4.7   |  33<H>  |  0.88    Ca    8.4      06 Oct 2023 04:15  Phos  3.7     10-06  Mg     2.5     10-06    TPro  6.7  /  Alb  2.8<L>  /  TBili  0.3  /  DBili  x   /  AST  45<H>  /  ALT  32  /  AlkPhos  102  10-06    PT/INR - ( 05 Oct 2023 05:25 )   PT: 12.3 sec;   INR: 1.08 ratio         PTT - ( 05 Oct 2023 05:25 )  PTT:33.6 sec  Urinalysis Basic - ( 06 Oct 2023 04:15 )    Color: x / Appearance: x / SG: x / pH: x  Gluc: 293 mg/dL / Ketone: x  / Bili: x / Urobili: x   Blood: x / Protein: x / Nitrite: x   Leuk Esterase: x / RBC: x / WBC x   Sq Epi: x / Non Sq Epi: x / Bacteria: x          RADIOLOGY & ADDITIONAL STUDIES REVIEWED:  ***    [ ]GOALS OF CARE DISCUSSION WITH PATIENT/FAMILY/PROXY:    CRITICAL CARE TIME SPENT: 35 minutes

## 2023-10-06 NOTE — DIETITIAN INITIAL EVALUATION ADULT - PERTINENT LABORATORY DATA
10-06    136  |  97  |  31<H>  ----------------------------<  293<H>  4.7   |  33<H>  |  0.88    Ca    8.4      06 Oct 2023 04:15  Phos  3.7     10-06  Mg     2.5     10-06    TPro  6.7  /  Alb  2.8<L>  /  TBili  0.3  /  DBili  x   /  AST  45<H>  /  ALT  32  /  AlkPhos  102  10-06  POCT Blood Glucose.: 337 mg/dL (10-06-23 @ 16:14)  A1C with Estimated Average Glucose Result: 11.2 % (10-06-23 @ 04:15)

## 2023-10-07 LAB
ALBUMIN SERPL ELPH-MCNC: 2.7 G/DL — LOW (ref 3.5–5)
ALP SERPL-CCNC: 93 U/L — SIGNIFICANT CHANGE UP (ref 40–120)
ALT FLD-CCNC: 55 U/L DA — SIGNIFICANT CHANGE UP (ref 10–60)
ANION GAP SERPL CALC-SCNC: 3 MMOL/L — LOW (ref 5–17)
AST SERPL-CCNC: 42 U/L — HIGH (ref 10–40)
BASE EXCESS BLDA CALC-SCNC: 10.5 MMOL/L — HIGH (ref -2–3)
BASOPHILS # BLD AUTO: 0.01 K/UL — SIGNIFICANT CHANGE UP (ref 0–0.2)
BASOPHILS NFR BLD AUTO: 0.1 % — SIGNIFICANT CHANGE UP (ref 0–2)
BILIRUB SERPL-MCNC: 0.2 MG/DL — SIGNIFICANT CHANGE UP (ref 0.2–1.2)
BLOOD GAS COMMENTS ARTERIAL: SIGNIFICANT CHANGE UP
BUN SERPL-MCNC: 33 MG/DL — HIGH (ref 7–18)
CALCIUM SERPL-MCNC: 8 MG/DL — LOW (ref 8.4–10.5)
CHLORIDE SERPL-SCNC: 98 MMOL/L — SIGNIFICANT CHANGE UP (ref 96–108)
CHOLEST SERPL-MCNC: 110 MG/DL — SIGNIFICANT CHANGE UP
CK MB BLD-MCNC: <0.3 % — SIGNIFICANT CHANGE UP (ref 0–3.5)
CK MB CFR SERPL CALC: <1 NG/ML — SIGNIFICANT CHANGE UP (ref 0–3.6)
CK SERPL-CCNC: 286 U/L — HIGH (ref 35–232)
CO2 SERPL-SCNC: 36 MMOL/L — HIGH (ref 22–31)
CREAT SERPL-MCNC: 0.88 MG/DL — SIGNIFICANT CHANGE UP (ref 0.5–1.3)
EGFR: 98 ML/MIN/1.73M2 — SIGNIFICANT CHANGE UP
EOSINOPHIL # BLD AUTO: 0 K/UL — SIGNIFICANT CHANGE UP (ref 0–0.5)
EOSINOPHIL NFR BLD AUTO: 0 % — SIGNIFICANT CHANGE UP (ref 0–6)
GLUCOSE BLDC GLUCOMTR-MCNC: 308 MG/DL — HIGH (ref 70–99)
GLUCOSE BLDC GLUCOMTR-MCNC: 309 MG/DL — HIGH (ref 70–99)
GLUCOSE BLDC GLUCOMTR-MCNC: 319 MG/DL — HIGH (ref 70–99)
GLUCOSE BLDC GLUCOMTR-MCNC: 363 MG/DL — HIGH (ref 70–99)
GLUCOSE SERPL-MCNC: 306 MG/DL — HIGH (ref 70–99)
HCO3 BLDA-SCNC: 38 MMOL/L — HIGH (ref 21–28)
HCT VFR BLD CALC: 38.5 % — LOW (ref 39–50)
HDLC SERPL-MCNC: 18 MG/DL — LOW
HGB BLD-MCNC: 11.7 G/DL — LOW (ref 13–17)
HOROWITZ INDEX BLDA+IHG-RTO: 45 — SIGNIFICANT CHANGE UP
IMM GRANULOCYTES NFR BLD AUTO: 0.5 % — SIGNIFICANT CHANGE UP (ref 0–0.9)
LIPID PNL WITH DIRECT LDL SERPL: 21 MG/DL — SIGNIFICANT CHANGE UP
LYMPHOCYTES # BLD AUTO: 0.86 K/UL — LOW (ref 1–3.3)
LYMPHOCYTES # BLD AUTO: 6.3 % — LOW (ref 13–44)
MAGNESIUM SERPL-MCNC: 2.7 MG/DL — HIGH (ref 1.6–2.6)
MCHC RBC-ENTMCNC: 27.8 PG — SIGNIFICANT CHANGE UP (ref 27–34)
MCHC RBC-ENTMCNC: 30.4 GM/DL — LOW (ref 32–36)
MCV RBC AUTO: 91.4 FL — SIGNIFICANT CHANGE UP (ref 80–100)
MONOCYTES # BLD AUTO: 0.72 K/UL — SIGNIFICANT CHANGE UP (ref 0–0.9)
MONOCYTES NFR BLD AUTO: 5.3 % — SIGNIFICANT CHANGE UP (ref 2–14)
NEUTROPHILS # BLD AUTO: 12.05 K/UL — HIGH (ref 1.8–7.4)
NEUTROPHILS NFR BLD AUTO: 87.8 % — HIGH (ref 43–77)
NON HDL CHOLESTEROL: 92 MG/DL — SIGNIFICANT CHANGE UP
NRBC # BLD: 0 /100 WBCS — SIGNIFICANT CHANGE UP (ref 0–0)
PCO2 BLDA: 63 MMHG — HIGH (ref 35–48)
PH BLDA: 7.39 — SIGNIFICANT CHANGE UP (ref 7.35–7.45)
PHOSPHATE SERPL-MCNC: 3.6 MG/DL — SIGNIFICANT CHANGE UP (ref 2.5–4.5)
PLATELET # BLD AUTO: 210 K/UL — SIGNIFICANT CHANGE UP (ref 150–400)
PO2 BLDA: 77 MMHG — LOW (ref 83–108)
POTASSIUM SERPL-MCNC: 4.7 MMOL/L — SIGNIFICANT CHANGE UP (ref 3.5–5.3)
POTASSIUM SERPL-SCNC: 4.7 MMOL/L — SIGNIFICANT CHANGE UP (ref 3.5–5.3)
PROT SERPL-MCNC: 6.5 G/DL — SIGNIFICANT CHANGE UP (ref 6–8.3)
RBC # BLD: 4.21 M/UL — SIGNIFICANT CHANGE UP (ref 4.2–5.8)
RBC # FLD: 12.7 % — SIGNIFICANT CHANGE UP (ref 10.3–14.5)
SAO2 % BLDA: 97 % — SIGNIFICANT CHANGE UP
SODIUM SERPL-SCNC: 137 MMOL/L — SIGNIFICANT CHANGE UP (ref 135–145)
TRIGL SERPL-MCNC: 354 MG/DL — HIGH
TROPONIN I, HIGH SENSITIVITY RESULT: 9.8 NG/L — SIGNIFICANT CHANGE UP
WBC # BLD: 13.71 K/UL — HIGH (ref 3.8–10.5)
WBC # FLD AUTO: 13.71 K/UL — HIGH (ref 3.8–10.5)

## 2023-10-07 RX ORDER — ACETAMINOPHEN 500 MG
1000 TABLET ORAL ONCE
Refills: 0 | Status: COMPLETED | OUTPATIENT
Start: 2023-10-07 | End: 2023-10-07

## 2023-10-07 RX ORDER — IPRATROPIUM BROMIDE 0.2 MG/ML
1 SOLUTION, NON-ORAL INHALATION EVERY 6 HOURS
Refills: 0 | Status: DISCONTINUED | OUTPATIENT
Start: 2023-10-07 | End: 2023-10-09

## 2023-10-07 RX ORDER — FENTANYL CITRATE 50 UG/ML
1 INJECTION INTRAVENOUS
Qty: 2500 | Refills: 0 | Status: DISCONTINUED | OUTPATIENT
Start: 2023-10-07 | End: 2023-10-08

## 2023-10-07 RX ORDER — IPRATROPIUM/ALBUTEROL SULFATE 18-103MCG
3 AEROSOL WITH ADAPTER (GRAM) INHALATION EVERY 6 HOURS
Refills: 0 | Status: DISCONTINUED | OUTPATIENT
Start: 2023-10-07 | End: 2023-10-07

## 2023-10-07 RX ORDER — DEXMEDETOMIDINE HYDROCHLORIDE IN 0.9% SODIUM CHLORIDE 4 UG/ML
0.2 INJECTION INTRAVENOUS
Qty: 400 | Refills: 0 | Status: DISCONTINUED | OUTPATIENT
Start: 2023-10-07 | End: 2023-10-08

## 2023-10-07 RX ORDER — ALBUTEROL 90 UG/1
2 AEROSOL, METERED ORAL EVERY 6 HOURS
Refills: 0 | Status: DISCONTINUED | OUTPATIENT
Start: 2023-10-07 | End: 2023-10-09

## 2023-10-07 RX ADMIN — DEXMEDETOMIDINE HYDROCHLORIDE IN 0.9% SODIUM CHLORIDE 4.68 MICROGRAM(S)/KG/HR: 4 INJECTION INTRAVENOUS at 20:41

## 2023-10-07 RX ADMIN — PANTOPRAZOLE SODIUM 40 MILLIGRAM(S): 20 TABLET, DELAYED RELEASE ORAL at 11:52

## 2023-10-07 RX ADMIN — Medication 1 PUFF(S): at 22:12

## 2023-10-07 RX ADMIN — MEROPENEM 100 MILLIGRAM(S): 1 INJECTION INTRAVENOUS at 23:14

## 2023-10-07 RX ADMIN — Medication 40 MILLIGRAM(S): at 13:42

## 2023-10-07 RX ADMIN — INSULIN GLARGINE 15 UNIT(S): 100 INJECTION, SOLUTION SUBCUTANEOUS at 23:13

## 2023-10-07 RX ADMIN — Medication 1 PATCH: at 19:11

## 2023-10-07 RX ADMIN — CHLORHEXIDINE GLUCONATE 15 MILLILITER(S): 213 SOLUTION TOPICAL at 05:38

## 2023-10-07 RX ADMIN — Medication 1 PATCH: at 11:52

## 2023-10-07 RX ADMIN — Medication 40 MILLIGRAM(S): at 05:38

## 2023-10-07 RX ADMIN — Medication 1000 MILLIGRAM(S): at 18:27

## 2023-10-07 RX ADMIN — PROPOFOL 6.26 MICROGRAM(S)/KG/MIN: 10 INJECTION, EMULSION INTRAVENOUS at 04:29

## 2023-10-07 RX ADMIN — Medication 8: at 11:57

## 2023-10-07 RX ADMIN — PROPOFOL 6.26 MICROGRAM(S)/KG/MIN: 10 INJECTION, EMULSION INTRAVENOUS at 00:22

## 2023-10-07 RX ADMIN — Medication 1 PATCH: at 14:46

## 2023-10-07 RX ADMIN — DEXMEDETOMIDINE HYDROCHLORIDE IN 0.9% SODIUM CHLORIDE 4.68 MICROGRAM(S)/KG/HR: 4 INJECTION INTRAVENOUS at 14:23

## 2023-10-07 RX ADMIN — Medication 1 PATCH: at 07:45

## 2023-10-07 RX ADMIN — HEPARIN SODIUM 5000 UNIT(S): 5000 INJECTION INTRAVENOUS; SUBCUTANEOUS at 13:41

## 2023-10-07 RX ADMIN — TAMSULOSIN HYDROCHLORIDE 0.4 MILLIGRAM(S): 0.4 CAPSULE ORAL at 23:14

## 2023-10-07 RX ADMIN — MEROPENEM 100 MILLIGRAM(S): 1 INJECTION INTRAVENOUS at 05:58

## 2023-10-07 RX ADMIN — Medication 8: at 17:14

## 2023-10-07 RX ADMIN — DEXMEDETOMIDINE HYDROCHLORIDE IN 0.9% SODIUM CHLORIDE 4.68 MICROGRAM(S)/KG/HR: 4 INJECTION INTRAVENOUS at 09:45

## 2023-10-07 RX ADMIN — ALBUTEROL 2 PUFF(S): 90 AEROSOL, METERED ORAL at 20:34

## 2023-10-07 RX ADMIN — Medication 10: at 23:15

## 2023-10-07 RX ADMIN — MEROPENEM 100 MILLIGRAM(S): 1 INJECTION INTRAVENOUS at 13:41

## 2023-10-07 RX ADMIN — ALBUTEROL 1 PUFF(S): 90 AEROSOL, METERED ORAL at 03:39

## 2023-10-07 RX ADMIN — Medication 400 MILLIGRAM(S): at 17:56

## 2023-10-07 RX ADMIN — HEPARIN SODIUM 5000 UNIT(S): 5000 INJECTION INTRAVENOUS; SUBCUTANEOUS at 23:13

## 2023-10-07 RX ADMIN — FENTANYL CITRATE 9.35 MICROGRAM(S)/KG/HR: 50 INJECTION INTRAVENOUS at 13:43

## 2023-10-07 RX ADMIN — HEPARIN SODIUM 5000 UNIT(S): 5000 INJECTION INTRAVENOUS; SUBCUTANEOUS at 05:37

## 2023-10-07 RX ADMIN — CHLORHEXIDINE GLUCONATE 15 MILLILITER(S): 213 SOLUTION TOPICAL at 17:14

## 2023-10-07 RX ADMIN — Medication 8: at 05:38

## 2023-10-07 NOTE — PROGRESS NOTE ADULT - ASSESSMENT
64 yo male from [facility], active smoker, with a PMHx of CAD, CHF on nocturnal BIPAP, COPD ( MULTIPLE intubations in past), peripheral neuropathy, GERD, HTN, HLD, obesity, polyarthritis, Pulmonary HTN, TIA, Vit D Def, presents with hypoxemia from nursing facility and lethargy and altered mental status. Upon evaluation in ED, patient afebrile, bp noted to be 124/77, tachycardic to 124bpm, and noted to be hypoxic on subsequently intubated. Physical examination noted for course lung examination, mark catheter placed and immediately draining 1.5L of pyuria. Labs significant for ABG 7.19/105/230/40,  potassium noted to be 6.2,  utox positive for benzo, wbc noted to be 22 and lactate normal. UA showing findings concerning for UTI, blood cultures pending. CT head, CTA chest and abdomen pending. Patient admitted to ICU for Acute on chronic hypoxic hypercapnic respiratory failure requiring emergent intubation.    #Acute encephalopathy   #Acute on chronic hypoxic hypercapnic respiratory failure  #Sepsis 2/2 UTI  #Hyperkalemia   # Hx of COPD  #Hx of DM   # Chronic CHF   # Hx of CAD  # HTN  # HLD  # GERD     =======Neuro======  10/5 Intubated     # Acute encephalopathy  Likely  Acute on chronic CO2 retention vs Sepsis 2/2 UTI vs CVA vs electrolyte abnormalities vs Urinary retention   p/w  lethargic upon arrival  noted to have C02 level of 105, and noted to have UTI  U-tox noted for benzos   s/p Azithromycin, ceftriaxone   CT Head: No hemorrhage  CT chest: No PE, Aspiration or infection involving the RT middle lobe and RT base.   10/5 Started meropenem 1000, solumedrol   Bcx Neg at 24hrs  - titrate vent  - c/w sedation with propofol   - c/w meropenem, solumedrol  - f/u urine cx      =======Pulm=======  # Acute on chronic hypoxic hypercapnic respiratory failure   noted to have hx of COPD on Nocturnal bipap  patient intubated in ED, due to lethargy and hypoxemia   CT chest: No PE, Aspiration or infection involving the RT middle lobe and RT base.   CXR: Right peripheral lower lung opacity, possibly infiltrate.  10/5 Started meropenem 1000, solumedrol  Repeat CO2 levels 105>56>46>52  - c/w home nebulizers through vent   - monitor CO2 levels  - c/w meropenem, solumedrol     #Hx of COPD  patient noted to be on symbicort, albuterol and spiriva at facility  - Resume meds nebs via vent   - monitor CO2 levels  - titrate ventilator as tolerated     =======Cardiac=======  # HX of CAD   patient noted to be on ASA at home   Troponin  neg  EKG: RBBB  - f/u lipid profile   - f/u CKMB, CK, Troponin      #HTN   Pt has h/o HTN  hold home anti hypertensives for now     #Chronic CHF   patient noted to have hx of Chronic CHF  last echo noted to show EF>70%      =======Renal=======  # no acute issues     =======ID=======  #Sepsis  Leukocytosis 22k  UA: (+) Bacteria, WBC, Leuk.... prior culture growing enterococcus  10/5 started meropenem  Consulted Dr. Tsai  Bcx: Neg at 24hrs  - f/u urine cx    =======Heme/Onc=======  # no acute issues    =======GI=======  #no acute issue   - NPO    =======Endo=======   #Hyperkalemia  noted to have K of 6.2  s/p 5 units of insulin and dextrose   repeat K 4.7  RESOLVED    #HX of DM  noted to be on lantus 30 units QHS  - on SSI  - q6hrs finger sticks  - c/w lantus 15    =======Skin/cath=======  # no acute issues    =======Prophylaxis======  DVT: heparin    ========Lines========  - peripheral IV lines    =======Dispo=======     62 yo male from [facility], active smoker, with a PMHx of CAD, CHF on nocturnal BIPAP, COPD ( MULTIPLE intubations in past), peripheral neuropathy, GERD, HTN, HLD, obesity, polyarthritis, Pulmonary HTN, TIA, Vit D Def, presents with hypoxemia from nursing facility and lethargy and altered mental status. Upon evaluation in ED, patient afebrile, bp noted to be 124/77, tachycardic to 124bpm, and noted to be hypoxic on subsequently intubated. Patient admitted to ICU for Acute on chronic hypoxic hypercapnic respiratory failure requiring emergent intubation.    #Acute encephalopathy   #Acute on chronic hypoxic hypercapnic respiratory failure  #Sepsis 2/2 UTI  #Hyperkalemia   # Hx of COPD  #Hx of DM   # Chronic CHF   # Hx of CAD  # HTN  # HLD  # GERD     =======Neuro======  10/5 Intubated   - SAT & SBT     # Acute encephalopathy  Likely  Acute on chronic CO2 retention vs Sepsis 2/2 UTI vs CVA vs electrolyte abnormalities vs Urinary retention   p/w  lethargic upon arrival  noted to have C02 level of 105, and noted to have UTI  U-tox noted for benzos   s/p Azithromycin, ceftriaxone   CT Head: No hemorrhage  CT chest: No PE, Aspiration or infection involving the RT middle lobe and RT base.   10/5 Started meropenem 1000, solumedrol   Bcx Neg at 24hrs  - titrate vent  - c/w meropenem, solumedrol  - f/u urine cx (10/5)      =======Pulm=======  # Acute on chronic hypoxic hypercapnic respiratory failure   noted to have hx of COPD on Nocturnal bipap  patient intubated in ED, due to lethargy and hypoxemia   CT chest: No PE, Aspiration or infection involving the RT middle lobe and RT base.   CXR: Right peripheral lower lung opacity, possibly infiltrate.  10/5 Started meropenem 1000, solumedrol  Repeat CO2 levels 105>56>46>52>63 (10/7)  - c/w home nebulizers through vent   - monitor CO2 levels  - c/w meropenem, solumedrol     #Hx of COPD  patient noted to be on symbicort, albuterol and spiriva at facility  - Resume meds nebs via vent   - monitor CO2 levels  - titrate ventilator as tolerated     =======Cardiac=======  # HX of CAD   patient noted to be on ASA at home   Troponin  neg  EKG: RBBB  Trops Neg  Triglyceride 354, , CKMB wnl       #HTN   Pt has h/o HTN  hold home anti hypertensives for now       #HLD  Triglyceride 354     #Chronic CHF   patient noted to have hx of Chronic CHF  last echo noted to show EF>70%    =======Renal=======  # no acute issues   - c/w Flomax       =======ID=======  #Sepsis  Leukocytosis 22k >13.7  UA: (+) Bacteria, WBC, Leuk.... prior culture growing enterococcus  10/5 started meropenem  Consulted Dr. Tsai  Bcx: Neg at 24hrs  - c/w meropenem  - f/u urine cx (10/5)    =======Heme/Onc=======  # no acute issues    =======GI=======  #no acute issue   - NPO    =======Endo=======   #Hyperkalemia  noted to have K of 6.2  s/p 5 units of insulin and dextrose   repeat K 4.7  RESOLVED    #HX of DM  noted to be on lantus 30 units QHS  - on SSI  - q6hrs finger sticks  - c/w lantus 15    =======Skin/cath=======  # no acute issues    =======Prophylaxis======  DVT:  SQ heparin    ========Lines========  - peripheral IV lines    =======Dispo=======

## 2023-10-07 NOTE — PROGRESS NOTE ADULT - SUBJECTIVE AND OBJECTIVE BOX
Patient is a 63y old  Male who presents with a chief complaint of Acute on chronic hypoxemic hypercapnic respiratory failure (07 Oct 2023 02:56)    PATIENT IS SEEN AND EXAMINED IN MEDICAL FLOOR.  JAMILA [    ]    MT [   ]      GT [   ]    ALLERGIES:  No Known Allergies      Daily Height in cm: 185.4 (06 Oct 2023 18:01)    Daily Weight in k.1 (07 Oct 2023 08:00)    VITALS:    Vital Signs Last 24 Hrs  T(C): 37.2 (07 Oct 2023 12:00), Max: 37.3 (06 Oct 2023 23:53)  T(F): 98.9 (07 Oct 2023 12:00), Max: 99.2 (06 Oct 2023 23:53)  HR: 69 (07 Oct 2023 14:00) (67 - 94)  BP: 136/96 (07 Oct 2023 14:00) (88/57 - 150/94)  BP(mean): 108 (07 Oct 2023 14:00) (68 - 111)  RR: 18 (07 Oct 2023 14:00) (15 - 30)  SpO2: 98% (07 Oct 2023 14:00) (91% - 99%)    Parameters below as of 07 Oct 2023 12:00  Patient On (Oxygen Delivery Method): ventilator    O2 Concentration (%): 45    LABS:    CBC Full  -  ( 07 Oct 2023 03:30 )  WBC Count : 13.71 K/uL  RBC Count : 4.21 M/uL  Hemoglobin : 11.7 g/dL  Hematocrit : 38.5 %  Platelet Count - Automated : 210 K/uL  Mean Cell Volume : 91.4 fl  Mean Cell Hemoglobin : 27.8 pg  Mean Cell Hemoglobin Concentration : 30.4 gm/dL  Auto Neutrophil # : 12.05 K/uL  Auto Lymphocyte # : 0.86 K/uL  Auto Monocyte # : 0.72 K/uL  Auto Eosinophil # : 0.00 K/uL  Auto Basophil # : 0.01 K/uL  Auto Neutrophil % : 87.8 %  Auto Lymphocyte % : 6.3 %  Auto Monocyte % : 5.3 %  Auto Eosinophil % : 0.0 %  Auto Basophil % : 0.1 %      10-07    137  |  98  |  33<H>  ----------------------------<  306<H>  4.7   |  36<H>  |  0.88    Ca    8.0<L>      07 Oct 2023 03:30  Phos  3.6     10-07  Mg     2.7     10-07    TPro  6.5  /  Alb  2.7<L>  /  TBili  0.2  /  DBili  x   /  AST  42<H>  /  ALT  55  /  AlkPhos  93  10-07    CAPILLARY BLOOD GLUCOSE      POCT Blood Glucose.: 309 mg/dL (07 Oct 2023 11:46)  POCT Blood Glucose.: 308 mg/dL (07 Oct 2023 05:29)  POCT Blood Glucose.: 351 mg/dL (06 Oct 2023 22:40)  POCT Blood Glucose.: 335 mg/dL (06 Oct 2023 21:11)  POCT Blood Glucose.: 337 mg/dL (06 Oct 2023 16:14)    CARDIAC MARKERS ( 07 Oct 2023 03:30 )  x     / x     / 286 U/L / x     / <1.0 ng/mL      LIVER FUNCTIONS - ( 07 Oct 2023 03:30 )  Alb: 2.7 g/dL / Pro: 6.5 g/dL / ALK PHOS: 93 U/L / ALT: 55 U/L DA / AST: 42 U/L / GGT: x           Creatinine Trend: 0.88<--, 0.88<--, 0.90<--  I&O's Summary    06 Oct 2023 07:01  -  07 Oct 2023 07:00  --------------------------------------------------------  IN: 1040.8 mL / OUT: 1220 mL / NET: -179.2 mL    07 Oct 2023 07:01  -  07 Oct 2023 15:09  --------------------------------------------------------  IN: 145.8 mL / OUT: 310 mL / NET: -164.2 mL        ABG - ( 07 Oct 2023 03:28 )  pH, Arterial: 7.39  pH, Blood: x     /  pCO2: 63    /  pO2: 77    / HCO3: 38    / Base Excess: 10.5  /  SaO2: 97                  Clean Catch Clean Catch (Midstream)  10-05 @ 08:30   Culture in progress  --  --      .Blood Blood-Peripheral  10-05 @ 08:15   No growth at 48 Hours  --  --      .Blood Blood-Peripheral  10-05 @ 08:00   No growth at 48 Hours  --  --      .Blood Blood-Peripheral  10-05 @ 05:35   No growth at 48 Hours  --  --      .Blood Blood-Peripheral  10-05 @ 05:25   No growth at 48 Hours  --  --          MEDICATIONS:    MEDICATIONS  (STANDING):  albuterol/ipratropium for Nebulization 3 milliLiter(s) Nebulizer every 6 hours  chlorhexidine 0.12% Liquid 15 milliLiter(s) Oral Mucosa every 12 hours  dexMEDEtomidine Infusion 0.2 MICROgram(s)/kG/Hr (4.68 mL/Hr) IV Continuous <Continuous>  fentaNYL   Infusion 1 MICROgram(s)/kG/Hr (9.35 mL/Hr) IV Continuous <Continuous>  heparin   Injectable 5000 Unit(s) SubCutaneous every 8 hours  insulin glargine Injectable (LANTUS) 15 Unit(s) SubCutaneous at bedtime  insulin lispro (ADMELOG) corrective regimen sliding scale   SubCutaneous every 6 hours  meropenem  IVPB 1000 milliGRAM(s) IV Intermittent every 8 hours  meropenem  IVPB      methylPREDNISolone sodium succinate Injectable 40 milliGRAM(s) IV Push daily  nicotine -   7 mG/24Hr(s) Patch 1 Patch Transdermal daily  pantoprazole  Injectable 40 milliGRAM(s) IV Push daily  tamsulosin 0.4 milliGRAM(s) Oral at bedtime      MEDICATIONS  (PRN):      REVIEW OF SYSTEMS:                           ALL ROS DONE [ X   ]    CONSTITUTIONAL:  LETHARGIC [   ], FEVER [   ], UNRESPONSIVE [   ]  CVS:  CP  [   ], SOB, [   ], PALPITATIONS [   ], DIZZYNESS [   ]  RS: COUGH [   ], SPUTUM [   ]  GI: ABDOMINAL PAIN [   ], NAUSEA [   ], VOMITINGS [   ], DIARRHEA [   ], CONSTIPATION [   ]  :  DYSURIA [   ], NOCTURIA [   ], INCREASED FREQUENCY [   ], DRIBLING [   ],  SKELETAL: PAINFUL JOINTS [   ], SWOLLEN JOINTS [   ], NECK ACHE [   ], LOW BACK ACHE [   ],  SKIN : ULCERS [   ], RASH [   ], ITCHING [   ]  CNS: HEAD ACHE [   ], DOUBLE VISION [   ], BLURRED VISION [   ], AMS / CONFUSION [   ], SEIZURES [   ], WEAKNESS [   ],TINGLING / NUMBNESS [   ]    PHYSICAL EXAMINATION:  GENERAL APPEARANCE: NO DISTRESS  HEENT:  NO PALLOR, NO  JVD,  NO   NODES, NECK SUPPLE  CVS: S1 +, S2 +,   RS: AEEB,  OCCASIONAL  RALES +,   NO RONCHI   ;   INTUBATED+  ABD: SOFT, NT, NO, BS +  EXT: NO PE  SKIN: WARM,   SKELETAL:  ROM ACCEPTABLE  CNS:  AAO X 0    RADIOLOGY :    RADIOLOGY AND READINGS REVIEWED    ASSESSMENT :       PLAN:  HPI:  Patient is a 62 yo male from [facility], active smoker, with a PMHx of CAD, CHF on nocturnal BIPAP, COPD ( MULTIPLE intubations in past), peripheral neuropathy, GERD, HTN, HLD, obesity, polyarthritis, Pulmonary HTN, TIA, Vit D Def, presents with hypoxemia from nursing facility and lethargy. Patient unable to provide medical history. As per ED, documentation patient had an abrupt onset shortness of breath, generalized weakness, elevated heart rate, hypoxia to the 70s.     Upon chart review, patient was admitted to [facility] ICU in 2022 due to acute on chronic hypoxic hypercapnic respiratory failure. Patient was eventually weaned off ventilator and then discharged home.   (05 Oct 2023 08:23)    # ACUTE ON CHRONIC HYPOXIC HYPERCAPNEIC RESPIRATORY FAILURE , ? ASPIRATION PNA  # HX OF COPD  # CHRONIC CHF    - PATIENT IS INTUBATED ON MV  - ON MEROPENEM, F/U BCX  - F/U CTA CHEST - ? RIGHT MIDDLE AND LOWER LOBE INFILTRATE  - ON DUONEB, SOLUMEDROL  - [ON SPIRIVA, SYMBICORT, ALBUTEROL - AT FACILITY]  - F/U ECHO  - CRITICAL CARE MANAGEMENT IN PROGRESS  - IS CONSULT    # SEPSIS S/T UTI + PNA  - ON MEROPENEM, F/U UCX AND BCX  - ID CONSULT    # ACUTE METABOLIC ENCEPHALOPATHY  - NOTED CT HEAD    # HYPERKALEMIA - IMPROVED  - S/P INSULIN + D50    # DM - LANTUS, SSI  + FS  # HTN  # HLD  # HX OF CAD  # GERD  # GI AND DVT PPX

## 2023-10-07 NOTE — PROGRESS NOTE ADULT - SUBJECTIVE AND OBJECTIVE BOX
Patient is a 63y old  Male who presents with a chief complaint of Acute respiratory failure with hypoxia     (06 Oct 2023 18:01)    INTERVAL HISTORY/ OVERNIGHT EVENTS:   No acute overnight events.     PRESSORS: [ ] YES [x ] NO  WHICH:    ANTIBIOTICS:                  DATE STARTED:  ANTIBIOTICS:                  DATE STARTED:  ANTIBIOTICS:                  DATE STARTED:    Antimicrobial:  meropenem  IVPB 1000 milliGRAM(s) IV Intermittent every 8 hours  meropenem  IVPB        Cardiovascular:    Pulmonary:  albuterol    90 MICROgram(s) HFA Inhaler 1 Puff(s) Inhalation every 6 hours    Hematalogic:  heparin   Injectable 5000 Unit(s) SubCutaneous every 8 hours    Other:  chlorhexidine 0.12% Liquid 15 milliLiter(s) Oral Mucosa every 12 hours  fentaNYL   Infusion. 0.5 MICROgram(s)/kG/Hr IV Continuous <Continuous>  insulin glargine Injectable (LANTUS) 15 Unit(s) SubCutaneous at bedtime  insulin lispro (ADMELOG) corrective regimen sliding scale   SubCutaneous every 6 hours  methylPREDNISolone sodium succinate Injectable 40 milliGRAM(s) IV Push every 8 hours  nicotine -   7 mG/24Hr(s) Patch 1 Patch Transdermal daily  pantoprazole  Injectable 40 milliGRAM(s) IV Push daily  propofol Infusion 10.003 MICROgram(s)/kG/Min IV Continuous <Continuous>  tamsulosin 0.4 milliGRAM(s) Oral at bedtime    albuterol    90 MICROgram(s) HFA Inhaler 1 Puff(s) Inhalation every 6 hours  chlorhexidine 0.12% Liquid 15 milliLiter(s) Oral Mucosa every 12 hours  fentaNYL   Infusion. 0.5 MICROgram(s)/kG/Hr IV Continuous <Continuous>  heparin   Injectable 5000 Unit(s) SubCutaneous every 8 hours  insulin glargine Injectable (LANTUS) 15 Unit(s) SubCutaneous at bedtime  insulin lispro (ADMELOG) corrective regimen sliding scale   SubCutaneous every 6 hours  meropenem  IVPB 1000 milliGRAM(s) IV Intermittent every 8 hours  meropenem  IVPB      methylPREDNISolone sodium succinate Injectable 40 milliGRAM(s) IV Push every 8 hours  nicotine -   7 mG/24Hr(s) Patch 1 Patch Transdermal daily  pantoprazole  Injectable 40 milliGRAM(s) IV Push daily  propofol Infusion 10.003 MICROgram(s)/kG/Min IV Continuous <Continuous>  tamsulosin 0.4 milliGRAM(s) Oral at bedtime    Drug Dosing Weight  Height (cm): 185.4 (06 Oct 2023 18:01)  Weight (kg): 93.5 (05 Oct 2023 10:30)  BMI (kg/m2): 27.2 (06 Oct 2023 18:01)  BSA (m2): 2.18 (06 Oct 2023 18:01)    CENTRAL LINE: [ ] YES [ ] NO  LOCATION:     AMOR: [ ] YES [ ] NO      A-LINE:  [ ] YES [ ] NO  LOCATION:         ICU Vital Signs Last 24 Hrs  T(C): 37.3 (06 Oct 2023 23:53), Max: 37.3 (06 Oct 2023 23:53)  T(F): 99.2 (06 Oct 2023 23:53), Max: 99.2 (06 Oct 2023 23:53)  HR: 85 (07 Oct 2023 02:00) (77 - 102)  BP: 127/82 (07 Oct 2023 02:00) (88/57 - 127/82)  BP(mean): 96 (07 Oct 2023 02:00) (68 - 96)  ABP: --  ABP(mean): --  RR: 18 (07 Oct 2023 02:00) (17 - 37)  SpO2: 96% (07 Oct 2023 02:00) (93% - 99%)    O2 Parameters below as of 06 Oct 2023 18:00  Patient On (Oxygen Delivery Method): ventilator    O2 Concentration (%): 45        ABG - ( 06 Oct 2023 03:34 )  pH, Arterial: 7.45  pH, Blood: x     /  pCO2: 52    /  pO2: 90    / HCO3: 36    / Base Excess: 10.2  /  SaO2: 98                    10-05 @ 07:01  -  10-06 @ 07:00  --------------------------------------------------------  IN: 1371.7 mL / OUT: 3660 mL / NET: -2288.3 mL        Mode: AC/ CMV (Assist Control/ Continuous Mandatory Ventilation)  RR (machine): 18  TV (machine): 450  FiO2: 45  PEEP: 5  ITime: 1  MAP: 12  PIP: 38      PHYSICAL EXAM:    GENERAL: NAD, well-groomed, well-developed  EYES: EOMI, PERRLA,   NECK: Supple, No JVD; Normal thyroid; Trachea midline  NERVOUS SYSTEM:  Alert & Oriented X3,  Motor Strength 5/5 B/L upper and lower extremities; DTRs 2+ intact and symmetric  CHEST/LUNG: No rales, rhonchi, wheezing   HEART: Regular rate and rhythm; No murmurs,   ABDOMEN: Soft, Nontender, Nondistended; Bowel sounds present  EXTREMITIES:  2+ Peripheral Pulses, No clubbing, cyanosis, or edema      LABS:                        11.9   18.53 )-----------( 189      ( 06 Oct 2023 04:15 )             37.9     10-06    136  |  97  |  31<H>  ----------------------------<  293<H>  4.7   |  33<H>  |  0.88    Ca    8.4      06 Oct 2023 04:15  Phos  3.7     10-06  Mg     2.5     10-06    TPro  6.7  /  Alb  2.8<L>  /  TBili  0.3  /  DBili  x   /  AST  45<H>  /  ALT  32  /  AlkPhos  102  10-06    PT/INR - ( 05 Oct 2023 05:25 )   PT: 12.3 sec;   INR: 1.08 ratio         PTT - ( 05 Oct 2023 05:25 )  PTT:33.6 sec  Urinalysis Basic - ( 06 Oct 2023 04:15 )    Color: x / Appearance: x / SG: x / pH: x  Gluc: 293 mg/dL / Ketone: x  / Bili: x / Urobili: x   Blood: x / Protein: x / Nitrite: x   Leuk Esterase: x / RBC: x / WBC x   Sq Epi: x / Non Sq Epi: x / Bacteria: x      CAPILLARY BLOOD GLUCOSE      POCT Blood Glucose.: 351 mg/dL (06 Oct 2023 22:40)  POCT Blood Glucose.: 335 mg/dL (06 Oct 2023 21:11)  POCT Blood Glucose.: 337 mg/dL (06 Oct 2023 16:14)  POCT Blood Glucose.: 239 mg/dL (06 Oct 2023 11:05)  POCT Blood Glucose.: 260 mg/dL (06 Oct 2023 06:32)    Culture Results:   Culture in progress (10-05 @ 08:30)  Culture Results:   No growth at 24 hours (10-05 @ 08:15)  Culture Results:   No growth at 24 hours (10-05 @ 08:00)  Culture Results:   No growth at 24 hours (10-05 @ 05:35)  Culture Results:   No growth at 24 hours (10-05 @ 05:25)      RADIOLOGY & ADDITIONAL STUDIES REVIEWED:  ***     Patient is a 63y old  Male who presents with a chief complaint of Acute on chronic hypoxemic hypercapnic respiratory failure (07 Oct 2023 02:56)      INTERVAL HPI/OVERNIGHT EVENTS: No overnight events. Pt was examined at bedside and he is in NAD. Pt is sedated and intubated        REVIEW OF SYSTEMS: Pt is sedated and intubated     ICU Vital Signs Last 24 Hrs  T(C): 36.6 (07 Oct 2023 04:27), Max: 37.3 (06 Oct 2023 23:53)  T(F): 97.8 (07 Oct 2023 04:27), Max: 99.2 (06 Oct 2023 23:53)  HR: 71 (07 Oct 2023 06:00) (67 - 102)  BP: 116/80 (07 Oct 2023 06:00) (88/57 - 127/82)  BP(mean): 91 (07 Oct 2023 06:00) (68 - 96)  ABP: --  ABP(mean): --  RR: 15 (07 Oct 2023 06:00) (15 - 37)  SpO2: 96% (07 Oct 2023 06:00) (93% - 99%)    O2 Parameters below as of 06 Oct 2023 18:00  Patient On (Oxygen Delivery Method): ventilator    O2 Concentration (%): 45      I&O's Summary    06 Oct 2023 07:01  -  07 Oct 2023 07:00  --------------------------------------------------------  IN: 1006.4 mL / OUT: 1170 mL / NET: -163.6 mL      Mode: AC/ CMV (Assist Control/ Continuous Mandatory Ventilation)  RR (machine): 18  TV (machine): 450  FiO2: 45  PEEP: 5  ITime: 1  MAP: 11  PIP: 32          PRESSORS: [ ] YES [x] NO    Antimicrobial:  meropenem  IVPB 1000 milliGRAM(s) IV Intermittent every 8 hours  meropenem  IVPB        Cardiovascular:    Pulmonary:  albuterol    90 MICROgram(s) HFA Inhaler 1 Puff(s) Inhalation every 6 hours    Hematalogic:  heparin   Injectable 5000 Unit(s) SubCutaneous every 8 hours    Other:  chlorhexidine 0.12% Liquid 15 milliLiter(s) Oral Mucosa every 12 hours  fentaNYL   Infusion. 0.5 MICROgram(s)/kG/Hr IV Continuous <Continuous>  insulin glargine Injectable (LANTUS) 15 Unit(s) SubCutaneous at bedtime  insulin lispro (ADMELOG) corrective regimen sliding scale   SubCutaneous every 6 hours  methylPREDNISolone sodium succinate Injectable 40 milliGRAM(s) IV Push every 8 hours  nicotine -   7 mG/24Hr(s) Patch 1 Patch Transdermal daily  pantoprazole  Injectable 40 milliGRAM(s) IV Push daily  propofol Infusion 10.003 MICROgram(s)/kG/Min IV Continuous <Continuous>  tamsulosin 0.4 milliGRAM(s) Oral at bedtime    albuterol    90 MICROgram(s) HFA Inhaler 1 Puff(s) Inhalation every 6 hours  chlorhexidine 0.12% Liquid 15 milliLiter(s) Oral Mucosa every 12 hours  fentaNYL   Infusion. 0.5 MICROgram(s)/kG/Hr IV Continuous <Continuous>  heparin   Injectable 5000 Unit(s) SubCutaneous every 8 hours  insulin glargine Injectable (LANTUS) 15 Unit(s) SubCutaneous at bedtime  insulin lispro (ADMELOG) corrective regimen sliding scale   SubCutaneous every 6 hours  meropenem  IVPB 1000 milliGRAM(s) IV Intermittent every 8 hours  meropenem  IVPB      methylPREDNISolone sodium succinate Injectable 40 milliGRAM(s) IV Push every 8 hours  nicotine -   7 mG/24Hr(s) Patch 1 Patch Transdermal daily  pantoprazole  Injectable 40 milliGRAM(s) IV Push daily  propofol Infusion 10.003 MICROgram(s)/kG/Min IV Continuous <Continuous>  tamsulosin 0.4 milliGRAM(s) Oral at bedtime    Drug Dosing Weight  Height (cm): 185.4 (06 Oct 2023 18:01)  Weight (kg): 93.5 (05 Oct 2023 10:30)  BMI (kg/m2): 27.2 (06 Oct 2023 18:01)  BSA (m2): 2.18 (06 Oct 2023 18:01)      MT: [ x] YES [ ] NO    DATE INSERTED: 10/5/23  REMOVE:  [ ] YES [ ] NO  EXPLAIN:    CENTRAL LINE: [ ] YES [x ] NO  LOCATION:   DATE INSERTED:  REMOVE: [ ] YES [ ] NO  EXPLAIN:    A-LINE:  [ ] YES [x ] NO  LOCATION:   DATE INSERTED:  REMOVE:  [ ] YES [ ] NO  EXPLAIN:    PMH -reviewed admission note, no change since admission  PAST MEDICAL & SURGICAL HISTORY:  COPD (chronic obstructive pulmonary disease)  Oxygen 2-3L at home      Stented coronary artery  2017      HLD (hyperlipidemia)      Pulmonary HTN      Type 2 diabetes mellitus without complication, with long-term current use of insulin      DM (diabetes mellitus)      CAD (coronary artery disease)      GERD (gastroesophageal reflux disease)      Insomnia      CHF (congestive heart failure)      HTN (hypertension)      Mood disorder      No significant past surgical history          PHYSICAL EXAM:  GENERAL: NAD, Pt is sedated and intubated   HEAD:  Atraumatic, Normocephalic  EYES:  PERRLA, conjunctiva and sclera clear  ENMT: No lesions. Pt is intubated   NECK:  normal appearance, No JVD   NERVOUS SYSTEM:  Pt is sedated   CHEST/LUNG: No chest deformity; No rales, rhonchi, wheezing   HEART: Regular rate and rhythm; No murmurs,  ABDOMEN: Soft, Nontender, Nondistended; Bowel sounds present  EXTREMITIES:  2+ Peripheral Pulses, No clubbing, cyanosis, or edema  SKIN: (+) peripheral IV cath noted on B/L arm. No rashes or lesions;      LABS:  CBC Full  -  ( 07 Oct 2023 03:30 )  WBC Count : 13.71 K/uL  RBC Count : 4.21 M/uL  Hemoglobin : 11.7 g/dL  Hematocrit : 38.5 %  Platelet Count - Automated : 210 K/uL  Mean Cell Volume : 91.4 fl  Mean Cell Hemoglobin : 27.8 pg  Mean Cell Hemoglobin Concentration : 30.4 gm/dL  Auto Neutrophil # : 12.05 K/uL  Auto Lymphocyte # : 0.86 K/uL  Auto Monocyte # : 0.72 K/uL  Auto Eosinophil # : 0.00 K/uL  Auto Basophil # : 0.01 K/uL  Auto Neutrophil % : 87.8 %  Auto Lymphocyte % : 6.3 %  Auto Monocyte % : 5.3 %  Auto Eosinophil % : 0.0 %  Auto Basophil % : 0.1 %    10-07    137  |  98  |  33<H>  ----------------------------<  306<H>  4.7   |  36<H>  |  0.88    Ca    8.0<L>      07 Oct 2023 03:30  Phos  3.6     10-07  Mg     2.7     10-07    TPro  6.5  /  Alb  2.7<L>  /  TBili  0.2  /  DBili  x   /  AST  42<H>  /  ALT  55  /  AlkPhos  93  10-07      Urinalysis Basic - ( 07 Oct 2023 03:30 )    Color: x / Appearance: x / SG: x / pH: x  Gluc: 306 mg/dL / Ketone: x  / Bili: x / Urobili: x   Blood: x / Protein: x / Nitrite: x   Leuk Esterase: x / RBC: x / WBC x   Sq Epi: x / Non Sq Epi: x / Bacteria: x      Culture Results:   Culture in progress (10-05 @ 08:30)  Culture Results:   No growth at 24 hours (10-05 @ 08:15)  Culture Results:   No growth at 24 hours (10-05 @ 08:00)  Culture Results:   No growth at 24 hours (10-05 @ 05:35)  Culture Results:   No growth at 24 hours (10-05 @ 05:25)      RADIOLOGY & ADDITIONAL STUDIES REVIEWED:  ***    [ ]GOALS OF CARE DISCUSSION WITH PATIENT/FAMILY/PROXY:    CRITICAL CARE TIME SPENT: 35 minutes Patient is a 63y old  Male who presents with a chief complaint of Acute on chronic hypoxemic hypercapnic respiratory failure (07 Oct 2023 02:56)      INTERVAL HPI/OVERNIGHT EVENTS: No overnight events. Pt was examined at bedside and he is in NAD. Pt is awake  and intubated          ICU Vital Signs Last 24 Hrs  T(C): 36.6 (07 Oct 2023 04:27), Max: 37.3 (06 Oct 2023 23:53)  T(F): 97.8 (07 Oct 2023 04:27), Max: 99.2 (06 Oct 2023 23:53)  HR: 71 (07 Oct 2023 06:00) (67 - 102)  BP: 116/80 (07 Oct 2023 06:00) (88/57 - 127/82)  BP(mean): 91 (07 Oct 2023 06:00) (68 - 96)  ABP: --  ABP(mean): --  RR: 15 (07 Oct 2023 06:00) (15 - 37)  SpO2: 96% (07 Oct 2023 06:00) (93% - 99%)    O2 Parameters below as of 06 Oct 2023 18:00  Patient On (Oxygen Delivery Method): ventilator    O2 Concentration (%): 45      I&O's Summary    06 Oct 2023 07:01  -  07 Oct 2023 07:00  --------------------------------------------------------  IN: 1006.4 mL / OUT: 1170 mL / NET: -163.6 mL      Mode: AC/ CMV (Assist Control/ Continuous Mandatory Ventilation)  RR (machine): 18  TV (machine): 450  FiO2: 45  PEEP: 5  ITime: 1  MAP: 11  PIP: 32          PRESSORS: [ ] YES [x] NO    Antimicrobial:  meropenem  IVPB 1000 milliGRAM(s) IV Intermittent every 8 hours  meropenem  IVPB        Cardiovascular:    Pulmonary:  albuterol    90 MICROgram(s) HFA Inhaler 1 Puff(s) Inhalation every 6 hours    Hematalogic:  heparin   Injectable 5000 Unit(s) SubCutaneous every 8 hours    Other:  chlorhexidine 0.12% Liquid 15 milliLiter(s) Oral Mucosa every 12 hours  fentaNYL   Infusion. 0.5 MICROgram(s)/kG/Hr IV Continuous <Continuous>  insulin glargine Injectable (LANTUS) 15 Unit(s) SubCutaneous at bedtime  insulin lispro (ADMELOG) corrective regimen sliding scale   SubCutaneous every 6 hours  methylPREDNISolone sodium succinate Injectable 40 milliGRAM(s) IV Push every 8 hours  nicotine -   7 mG/24Hr(s) Patch 1 Patch Transdermal daily  pantoprazole  Injectable 40 milliGRAM(s) IV Push daily  propofol Infusion 10.003 MICROgram(s)/kG/Min IV Continuous <Continuous>  tamsulosin 0.4 milliGRAM(s) Oral at bedtime    albuterol    90 MICROgram(s) HFA Inhaler 1 Puff(s) Inhalation every 6 hours  chlorhexidine 0.12% Liquid 15 milliLiter(s) Oral Mucosa every 12 hours  fentaNYL   Infusion. 0.5 MICROgram(s)/kG/Hr IV Continuous <Continuous>  heparin   Injectable 5000 Unit(s) SubCutaneous every 8 hours  insulin glargine Injectable (LANTUS) 15 Unit(s) SubCutaneous at bedtime  insulin lispro (ADMELOG) corrective regimen sliding scale   SubCutaneous every 6 hours  meropenem  IVPB 1000 milliGRAM(s) IV Intermittent every 8 hours  meropenem  IVPB      methylPREDNISolone sodium succinate Injectable 40 milliGRAM(s) IV Push every 8 hours  nicotine -   7 mG/24Hr(s) Patch 1 Patch Transdermal daily  pantoprazole  Injectable 40 milliGRAM(s) IV Push daily  propofol Infusion 10.003 MICROgram(s)/kG/Min IV Continuous <Continuous>  tamsulosin 0.4 milliGRAM(s) Oral at bedtime    Drug Dosing Weight  Height (cm): 185.4 (06 Oct 2023 18:01)  Weight (kg): 93.5 (05 Oct 2023 10:30)  BMI (kg/m2): 27.2 (06 Oct 2023 18:01)  BSA (m2): 2.18 (06 Oct 2023 18:01)      AMOR: [ x] YES [ ] NO    DATE INSERTED: 10/5/23  REMOVE:  [ ] YES [ ] NO  EXPLAIN:    CENTRAL LINE: [ ] YES [x ] NO  LOCATION:   DATE INSERTED:  REMOVE: [ ] YES [ ] NO  EXPLAIN:    A-LINE:  [ ] YES [x ] NO  LOCATION:   DATE INSERTED:  REMOVE:  [ ] YES [ ] NO  EXPLAIN:    PMH -reviewed admission note, no change since admission  PAST MEDICAL & SURGICAL HISTORY:  COPD (chronic obstructive pulmonary disease)  Oxygen 2-3L at home      Stented coronary artery  2017      HLD (hyperlipidemia)      Pulmonary HTN      Type 2 diabetes mellitus without complication, with long-term current use of insulin      DM (diabetes mellitus)      CAD (coronary artery disease)      GERD (gastroesophageal reflux disease)      Insomnia      CHF (congestive heart failure)      HTN (hypertension)      Mood disorder      No significant past surgical history          PHYSICAL EXAM:  GENERAL: NAD, Pt is awake and intubated   HEAD:  Atraumatic, Normocephalic  EYES:  PERRLA, conjunctiva and sclera clear  ENMT: No lesions. Pt is intubated   NECK:  normal appearance, No JVD   NERVOUS SYSTEM:  Pt is awake and following commands  CHEST/LUNG: No chest deformity; No rales, rhonchi, wheezing   HEART: Regular rate and rhythm; No murmurs,  ABDOMEN: Soft, Nontender, Nondistended; Bowel sounds present  EXTREMITIES:  2+ Peripheral Pulses, No clubbing, cyanosis, or edema  SKIN: (+) peripheral IV cath noted on B/L arm. No rashes or lesions;      LABS:  CBC Full  -  ( 07 Oct 2023 03:30 )  WBC Count : 13.71 K/uL  RBC Count : 4.21 M/uL  Hemoglobin : 11.7 g/dL  Hematocrit : 38.5 %  Platelet Count - Automated : 210 K/uL  Mean Cell Volume : 91.4 fl  Mean Cell Hemoglobin : 27.8 pg  Mean Cell Hemoglobin Concentration : 30.4 gm/dL  Auto Neutrophil # : 12.05 K/uL  Auto Lymphocyte # : 0.86 K/uL  Auto Monocyte # : 0.72 K/uL  Auto Eosinophil # : 0.00 K/uL  Auto Basophil # : 0.01 K/uL  Auto Neutrophil % : 87.8 %  Auto Lymphocyte % : 6.3 %  Auto Monocyte % : 5.3 %  Auto Eosinophil % : 0.0 %  Auto Basophil % : 0.1 %    10-07    137  |  98  |  33<H>  ----------------------------<  306<H>  4.7   |  36<H>  |  0.88    Ca    8.0<L>      07 Oct 2023 03:30  Phos  3.6     10-07  Mg     2.7     10-07    TPro  6.5  /  Alb  2.7<L>  /  TBili  0.2  /  DBili  x   /  AST  42<H>  /  ALT  55  /  AlkPhos  93  10-07      Urinalysis Basic - ( 07 Oct 2023 03:30 )    Color: x / Appearance: x / SG: x / pH: x  Gluc: 306 mg/dL / Ketone: x  / Bili: x / Urobili: x   Blood: x / Protein: x / Nitrite: x   Leuk Esterase: x / RBC: x / WBC x   Sq Epi: x / Non Sq Epi: x / Bacteria: x      Culture Results:   Culture in progress (10-05 @ 08:30)  Culture Results:   No growth at 24 hours (10-05 @ 08:15)  Culture Results:   No growth at 24 hours (10-05 @ 08:00)  Culture Results:   No growth at 24 hours (10-05 @ 05:35)  Culture Results:   No growth at 24 hours (10-05 @ 05:25)      RADIOLOGY & ADDITIONAL STUDIES REVIEWED:  ***    [ ]GOALS OF CARE DISCUSSION WITH PATIENT/FAMILY/PROXY:    CRITICAL CARE TIME SPENT: 35 minutes

## 2023-10-07 NOTE — CHART NOTE - NSCHARTNOTEFT_GEN_A_CORE
Spoke to patient spouse Chiquita and family relative at bedside regarding patient mental status and prognosis. They were informed that the patient has regained is mental status and he is alert and oriented. Also they were informed that he is in NAD at the moment Spoke to patient sister Chiquita Merrill ([phone]) about patient mental status and prognosis. She was informed he is in NAD at the moment and the plan is to extubate him if he is stable enough to breath on his own.

## 2023-10-08 LAB
-  AMPICILLIN: SIGNIFICANT CHANGE UP
-  CIPROFLOXACIN: SIGNIFICANT CHANGE UP
-  LEVOFLOXACIN: SIGNIFICANT CHANGE UP
-  NITROFURANTOIN: SIGNIFICANT CHANGE UP
-  TETRACYCLINE: SIGNIFICANT CHANGE UP
-  VANCOMYCIN: SIGNIFICANT CHANGE UP
ALBUMIN SERPL ELPH-MCNC: 2.9 G/DL — LOW (ref 3.5–5)
ALP SERPL-CCNC: 85 U/L — SIGNIFICANT CHANGE UP (ref 40–120)
ALT FLD-CCNC: 89 U/L DA — HIGH (ref 10–60)
ANION GAP SERPL CALC-SCNC: 3 MMOL/L — LOW (ref 5–17)
AST SERPL-CCNC: 65 U/L — HIGH (ref 10–40)
BASE EXCESS BLDA CALC-SCNC: 8.6 MMOL/L — HIGH (ref -2–3)
BASOPHILS # BLD AUTO: 0.01 K/UL — SIGNIFICANT CHANGE UP (ref 0–0.2)
BASOPHILS NFR BLD AUTO: 0.1 % — SIGNIFICANT CHANGE UP (ref 0–2)
BILIRUB SERPL-MCNC: 0.3 MG/DL — SIGNIFICANT CHANGE UP (ref 0.2–1.2)
BLOOD GAS COMMENTS ARTERIAL: SIGNIFICANT CHANGE UP
BUN SERPL-MCNC: 45 MG/DL — HIGH (ref 7–18)
CALCIUM SERPL-MCNC: 8.4 MG/DL — SIGNIFICANT CHANGE UP (ref 8.4–10.5)
CHLORIDE SERPL-SCNC: 101 MMOL/L — SIGNIFICANT CHANGE UP (ref 96–108)
CO2 SERPL-SCNC: 32 MMOL/L — HIGH (ref 22–31)
CREAT SERPL-MCNC: 0.99 MG/DL — SIGNIFICANT CHANGE UP (ref 0.5–1.3)
CULTURE RESULTS: SIGNIFICANT CHANGE UP
EGFR: 87 ML/MIN/1.73M2 — SIGNIFICANT CHANGE UP
EOSINOPHIL # BLD AUTO: 0 K/UL — SIGNIFICANT CHANGE UP (ref 0–0.5)
EOSINOPHIL NFR BLD AUTO: 0 % — SIGNIFICANT CHANGE UP (ref 0–6)
GLUCOSE BLDC GLUCOMTR-MCNC: 252 MG/DL — HIGH (ref 70–99)
GLUCOSE BLDC GLUCOMTR-MCNC: 299 MG/DL — HIGH (ref 70–99)
GLUCOSE BLDC GLUCOMTR-MCNC: 333 MG/DL — HIGH (ref 70–99)
GLUCOSE BLDC GLUCOMTR-MCNC: 389 MG/DL — HIGH (ref 70–99)
GLUCOSE BLDC GLUCOMTR-MCNC: 417 MG/DL — HIGH (ref 70–99)
GLUCOSE SERPL-MCNC: 423 MG/DL — HIGH (ref 70–99)
HCO3 BLDA-SCNC: 34 MMOL/L — HIGH (ref 21–28)
HCT VFR BLD CALC: 44.8 % — SIGNIFICANT CHANGE UP (ref 39–50)
HGB BLD-MCNC: 13.7 G/DL — SIGNIFICANT CHANGE UP (ref 13–17)
HOROWITZ INDEX BLDA+IHG-RTO: 45 — SIGNIFICANT CHANGE UP
IMM GRANULOCYTES NFR BLD AUTO: 0.3 % — SIGNIFICANT CHANGE UP (ref 0–0.9)
LYMPHOCYTES # BLD AUTO: 1.19 K/UL — SIGNIFICANT CHANGE UP (ref 1–3.3)
LYMPHOCYTES # BLD AUTO: 13 % — SIGNIFICANT CHANGE UP (ref 13–44)
MAGNESIUM SERPL-MCNC: 2.6 MG/DL — SIGNIFICANT CHANGE UP (ref 1.6–2.6)
MCHC RBC-ENTMCNC: 27.8 PG — SIGNIFICANT CHANGE UP (ref 27–34)
MCHC RBC-ENTMCNC: 30.6 GM/DL — LOW (ref 32–36)
MCV RBC AUTO: 91.1 FL — SIGNIFICANT CHANGE UP (ref 80–100)
METHOD TYPE: SIGNIFICANT CHANGE UP
MONOCYTES # BLD AUTO: 1.37 K/UL — HIGH (ref 0–0.9)
MONOCYTES NFR BLD AUTO: 15 % — HIGH (ref 2–14)
NEUTROPHILS # BLD AUTO: 6.53 K/UL — SIGNIFICANT CHANGE UP (ref 1.8–7.4)
NEUTROPHILS NFR BLD AUTO: 71.6 % — SIGNIFICANT CHANGE UP (ref 43–77)
NRBC # BLD: 0 /100 WBCS — SIGNIFICANT CHANGE UP (ref 0–0)
ORGANISM # SPEC MICROSCOPIC CNT: SIGNIFICANT CHANGE UP
ORGANISM # SPEC MICROSCOPIC CNT: SIGNIFICANT CHANGE UP
PCO2 BLDA: 49 MMHG — HIGH (ref 35–48)
PH BLDA: 7.45 — SIGNIFICANT CHANGE UP (ref 7.35–7.45)
PHOSPHATE SERPL-MCNC: 2.7 MG/DL — SIGNIFICANT CHANGE UP (ref 2.5–4.5)
PLATELET # BLD AUTO: 209 K/UL — SIGNIFICANT CHANGE UP (ref 150–400)
PO2 BLDA: 87 MMHG — SIGNIFICANT CHANGE UP (ref 83–108)
POTASSIUM SERPL-MCNC: 4.9 MMOL/L — SIGNIFICANT CHANGE UP (ref 3.5–5.3)
POTASSIUM SERPL-SCNC: 4.9 MMOL/L — SIGNIFICANT CHANGE UP (ref 3.5–5.3)
PROT SERPL-MCNC: 6.8 G/DL — SIGNIFICANT CHANGE UP (ref 6–8.3)
RBC # BLD: 4.92 M/UL — SIGNIFICANT CHANGE UP (ref 4.2–5.8)
RBC # FLD: 12.4 % — SIGNIFICANT CHANGE UP (ref 10.3–14.5)
SAO2 % BLDA: 98 % — SIGNIFICANT CHANGE UP
SODIUM SERPL-SCNC: 136 MMOL/L — SIGNIFICANT CHANGE UP (ref 135–145)
SPECIMEN SOURCE: SIGNIFICANT CHANGE UP
WBC # BLD: 9.13 K/UL — SIGNIFICANT CHANGE UP (ref 3.8–10.5)
WBC # FLD AUTO: 9.13 K/UL — SIGNIFICANT CHANGE UP (ref 3.8–10.5)

## 2023-10-08 PROCEDURE — 93010 ELECTROCARDIOGRAM REPORT: CPT

## 2023-10-08 RX ORDER — ACETAMINOPHEN 500 MG
1000 TABLET ORAL ONCE
Refills: 0 | Status: COMPLETED | OUTPATIENT
Start: 2023-10-08 | End: 2023-10-08

## 2023-10-08 RX ORDER — INSULIN LISPRO 100/ML
10 VIAL (ML) SUBCUTANEOUS EVERY 6 HOURS
Refills: 0 | Status: DISCONTINUED | OUTPATIENT
Start: 2023-10-08 | End: 2023-10-09

## 2023-10-08 RX ORDER — INSULIN LISPRO 100/ML
VIAL (ML) SUBCUTANEOUS
Refills: 0 | Status: DISCONTINUED | OUTPATIENT
Start: 2023-10-08 | End: 2023-10-08

## 2023-10-08 RX ORDER — IBUPROFEN 200 MG
600 TABLET ORAL ONCE
Refills: 0 | Status: COMPLETED | OUTPATIENT
Start: 2023-10-08 | End: 2023-10-08

## 2023-10-08 RX ORDER — INSULIN GLARGINE 100 [IU]/ML
25 INJECTION, SOLUTION SUBCUTANEOUS AT BEDTIME
Refills: 0 | Status: DISCONTINUED | OUTPATIENT
Start: 2023-10-08 | End: 2023-10-09

## 2023-10-08 RX ORDER — FENTANYL CITRATE 50 UG/ML
2 INJECTION INTRAVENOUS
Qty: 2500 | Refills: 0 | Status: DISCONTINUED | OUTPATIENT
Start: 2023-10-08 | End: 2023-10-09

## 2023-10-08 RX ORDER — INSULIN LISPRO 100/ML
VIAL (ML) SUBCUTANEOUS EVERY 6 HOURS
Refills: 0 | Status: DISCONTINUED | OUTPATIENT
Start: 2023-10-08 | End: 2023-10-11

## 2023-10-08 RX ORDER — INSULIN LISPRO 100/ML
10 VIAL (ML) SUBCUTANEOUS ONCE
Refills: 0 | Status: COMPLETED | OUTPATIENT
Start: 2023-10-08 | End: 2023-10-08

## 2023-10-08 RX ADMIN — HEPARIN SODIUM 5000 UNIT(S): 5000 INJECTION INTRAVENOUS; SUBCUTANEOUS at 06:13

## 2023-10-08 RX ADMIN — Medication 12: at 12:00

## 2023-10-08 RX ADMIN — MEROPENEM 100 MILLIGRAM(S): 1 INJECTION INTRAVENOUS at 06:12

## 2023-10-08 RX ADMIN — Medication 1 PUFF(S): at 15:00

## 2023-10-08 RX ADMIN — MEROPENEM 100 MILLIGRAM(S): 1 INJECTION INTRAVENOUS at 23:27

## 2023-10-08 RX ADMIN — Medication 1 PATCH: at 12:05

## 2023-10-08 RX ADMIN — CHLORHEXIDINE GLUCONATE 15 MILLILITER(S): 213 SOLUTION TOPICAL at 17:10

## 2023-10-08 RX ADMIN — Medication 10 UNIT(S): at 17:11

## 2023-10-08 RX ADMIN — Medication 1000 MILLIGRAM(S): at 07:20

## 2023-10-08 RX ADMIN — Medication 1 PUFF(S): at 20:06

## 2023-10-08 RX ADMIN — Medication 10 UNIT(S): at 23:24

## 2023-10-08 RX ADMIN — PANTOPRAZOLE SODIUM 40 MILLIGRAM(S): 20 TABLET, DELAYED RELEASE ORAL at 12:04

## 2023-10-08 RX ADMIN — HEPARIN SODIUM 5000 UNIT(S): 5000 INJECTION INTRAVENOUS; SUBCUTANEOUS at 15:00

## 2023-10-08 RX ADMIN — ALBUTEROL 2 PUFF(S): 90 AEROSOL, METERED ORAL at 20:05

## 2023-10-08 RX ADMIN — Medication 6: at 23:23

## 2023-10-08 RX ADMIN — Medication 6: at 17:10

## 2023-10-08 RX ADMIN — Medication 10 UNIT(S): at 12:05

## 2023-10-08 RX ADMIN — Medication 1 PATCH: at 07:48

## 2023-10-08 RX ADMIN — Medication 10: at 06:14

## 2023-10-08 RX ADMIN — Medication 600 MILLIGRAM(S): at 09:30

## 2023-10-08 RX ADMIN — Medication 40 MILLIGRAM(S): at 06:13

## 2023-10-08 RX ADMIN — Medication 1 PUFF(S): at 08:14

## 2023-10-08 RX ADMIN — MEROPENEM 100 MILLIGRAM(S): 1 INJECTION INTRAVENOUS at 15:00

## 2023-10-08 RX ADMIN — TAMSULOSIN HYDROCHLORIDE 0.4 MILLIGRAM(S): 0.4 CAPSULE ORAL at 23:27

## 2023-10-08 RX ADMIN — Medication 600 MILLIGRAM(S): at 08:44

## 2023-10-08 RX ADMIN — Medication 400 MILLIGRAM(S): at 05:10

## 2023-10-08 RX ADMIN — INSULIN GLARGINE 25 UNIT(S): 100 INJECTION, SOLUTION SUBCUTANEOUS at 23:24

## 2023-10-08 RX ADMIN — FENTANYL CITRATE 18.7 MICROGRAM(S)/KG/HR: 50 INJECTION INTRAVENOUS at 13:52

## 2023-10-08 RX ADMIN — DEXMEDETOMIDINE HYDROCHLORIDE IN 0.9% SODIUM CHLORIDE 4.68 MICROGRAM(S)/KG/HR: 4 INJECTION INTRAVENOUS at 05:10

## 2023-10-08 RX ADMIN — CHLORHEXIDINE GLUCONATE 15 MILLILITER(S): 213 SOLUTION TOPICAL at 06:12

## 2023-10-08 RX ADMIN — HEPARIN SODIUM 5000 UNIT(S): 5000 INJECTION INTRAVENOUS; SUBCUTANEOUS at 23:25

## 2023-10-08 RX ADMIN — Medication 1 PATCH: at 13:40

## 2023-10-08 RX ADMIN — Medication 1 PATCH: at 19:15

## 2023-10-08 RX ADMIN — Medication 1 PUFF(S): at 02:07

## 2023-10-08 NOTE — PROGRESS NOTE ADULT - SUBJECTIVE AND OBJECTIVE BOX
Patient is a 63y old  Male who presents with a chief complaint of Acute on chronic hypoxemic hypercapnic respiratory failure (08 Oct 2023 02:28)    PATIENT IS SEEN AND EXAMINED IN MEDICAL FLOOR.  JAMILA [    ]    MT [   ]      GT [   ]    ALLERGIES:  Precedex (Fever)      Daily     Daily Weight in k.5 (08 Oct 2023 07:00)    VITALS:    Vital Signs Last 24 Hrs  T(C): 37.4 (08 Oct 2023 11:00), Max: 40.6 (08 Oct 2023 08:10)  T(F): 99.4 (08 Oct 2023 11:00), Max: 105 (08 Oct 2023 08:10)  HR: 64 (08 Oct 2023 11:52) (64 - 80)  BP: 112/71 (08 Oct 2023 11:00) (104/74 - 150/111)  BP(mean): 83 (08 Oct 2023 11:00) (83 - 124)  RR: 25 (08 Oct 2023 11:00) (10 - 28)  SpO2: 96% (08 Oct 2023 11:52) (84% - 100%)    Parameters below as of 08 Oct 2023 07:00  Patient On (Oxygen Delivery Method): ventilator    O2 Concentration (%): 45    LABS:    CBC Full  -  ( 08 Oct 2023 03:28 )  WBC Count : 9.13 K/uL  RBC Count : 4.92 M/uL  Hemoglobin : 13.7 g/dL  Hematocrit : 44.8 %  Platelet Count - Automated : 209 K/uL  Mean Cell Volume : 91.1 fl  Mean Cell Hemoglobin : 27.8 pg  Mean Cell Hemoglobin Concentration : 30.6 gm/dL  Auto Neutrophil # : 6.53 K/uL  Auto Lymphocyte # : 1.19 K/uL  Auto Monocyte # : 1.37 K/uL  Auto Eosinophil # : 0.00 K/uL  Auto Basophil # : 0.01 K/uL  Auto Neutrophil % : 71.6 %  Auto Lymphocyte % : 13.0 %  Auto Monocyte % : 15.0 %  Auto Eosinophil % : 0.0 %  Auto Basophil % : 0.1 %      10-08    136  |  101  |  45<H>  ----------------------------<  423<H>  4.9   |  32<H>  |  0.99    Ca    8.4      08 Oct 2023 03:28  Phos  2.7     10-  Mg     2.6     1008    TPro  6.8  /  Alb  2.9<L>  /  TBili  0.3  /  DBili  x   /  AST  65<H>  /  ALT  89<H>  /  AlkPhos  85  10-08    CAPILLARY BLOOD GLUCOSE      POCT Blood Glucose.: 417 mg/dL (08 Oct 2023 11:14)  POCT Blood Glucose.: 389 mg/dL (08 Oct 2023 05:59)  POCT Blood Glucose.: 363 mg/dL (07 Oct 2023 22:47)  POCT Blood Glucose.: 319 mg/dL (07 Oct 2023 17:00)    CARDIAC MARKERS ( 07 Oct 2023 03:30 )  x     / x     / 286 U/L / x     / <1.0 ng/mL      LIVER FUNCTIONS - ( 08 Oct 2023 03:28 )  Alb: 2.9 g/dL / Pro: 6.8 g/dL / ALK PHOS: 85 U/L / ALT: 89 U/L DA / AST: 65 U/L / GGT: x           Creatinine Trend: 0.99<--, 0.88<--, 0.88<--, 0.90<--  I&O's Summary    07 Oct 2023 07:01  -  08 Oct 2023 07:00  --------------------------------------------------------  IN: 1631.6 mL / OUT: 1500 mL / NET: 131.5 mL    08 Oct 2023 07:01  -  08 Oct 2023 14:01  --------------------------------------------------------  IN: 0 mL / OUT: 230 mL / NET: -230 mL        ABG - ( 08 Oct 2023 03:36 )  pH, Arterial: 7.45  pH, Blood: x     /  pCO2: 49    /  pO2: 87    / HCO3: 34    / Base Excess: 8.6   /  SaO2: 98                  Clean Catch Clean Catch (Midstream)  10-05 @ 08:30   >100,000 CFU/ml Enterococcus faecalis  --  --      .Blood Blood-Peripheral  10-05 @ 08:15   No growth at 72 Hours  --  --      .Blood Blood-Peripheral  10-05 @ 08:00   No growth at 72 Hours  --  --      .Blood Blood-Peripheral  10-05 @ 05:35   No growth at 72 Hours  --  --      .Blood Blood-Peripheral  10 @ 05:25   No growth at 72 Hours  --  --          MEDICATIONS:    MEDICATIONS  (STANDING):  chlorhexidine 0.12% Liquid 15 milliLiter(s) Oral Mucosa every 12 hours  fentaNYL   Infusion 2 MICROgram(s)/kG/Hr (18.7 mL/Hr) IV Continuous <Continuous>  heparin   Injectable 5000 Unit(s) SubCutaneous every 8 hours  insulin glargine Injectable (LANTUS) 25 Unit(s) SubCutaneous at bedtime  insulin lispro (ADMELOG) corrective regimen sliding scale   SubCutaneous every 6 hours  ipratropium 17 MICROgram(s) HFA Inhaler 1 Puff(s) Inhalation every 6 hours  meropenem  IVPB 1000 milliGRAM(s) IV Intermittent every 8 hours  meropenem  IVPB      methylPREDNISolone sodium succinate Injectable 40 milliGRAM(s) IV Push daily  nicotine -   7 mG/24Hr(s) Patch 1 Patch Transdermal daily  pantoprazole  Injectable 40 milliGRAM(s) IV Push daily  tamsulosin 0.4 milliGRAM(s) Oral at bedtime      MEDICATIONS  (PRN):  albuterol    90 MICROgram(s) HFA Inhaler 2 Puff(s) Inhalation every 6 hours PRN Shortness of Breath and/or Wheezing      REVIEW OF SYSTEMS:                           ALL ROS DONE [ X   ]    CONSTITUTIONAL:  LETHARGIC [   ], FEVER [   ], UNRESPONSIVE [   ]  CVS:  CP  [   ], SOB, [   ], PALPITATIONS [   ], DIZZYNESS [   ]  RS: COUGH [   ], SPUTUM [   ]  GI: ABDOMINAL PAIN [   ], NAUSEA [   ], VOMITINGS [   ], DIARRHEA [   ], CONSTIPATION [   ]  :  DYSURIA [   ], NOCTURIA [   ], INCREASED FREQUENCY [   ], DRIBLING [   ],  SKELETAL: PAINFUL JOINTS [   ], SWOLLEN JOINTS [   ], NECK ACHE [   ], LOW BACK ACHE [   ],  SKIN : ULCERS [   ], RASH [   ], ITCHING [   ]  CNS: HEAD ACHE [   ], DOUBLE VISION [   ], BLURRED VISION [   ], AMS / CONFUSION [   ], SEIZURES [   ], WEAKNESS [   ],TINGLING / NUMBNESS [   ]      PHYSICAL EXAMINATION:  GENERAL APPEARANCE: NO DISTRESS  HEENT:  NO PALLOR, NO  JVD,  NO   NODES, NECK SUPPLE  CVS: S1 +, S2 +,   RS: AEEB,  OCCASIONAL  RALES +,   NO RONCHI   ;   INTUBATED+  ABD: SOFT, NT, NO, BS +  EXT: NO PE  SKIN: WARM,   SKELETAL:  ROM ACCEPTABLE  CNS:  AAO X 0    RADIOLOGY :    RADIOLOGY AND READINGS REVIEWED    ASSESSMENT :       PLAN:  HPI:  Patient is a 62 yo male from [facility], active smoker, with a PMHx of CAD, CHF on nocturnal BIPAP, COPD ( MULTIPLE intubations in past), peripheral neuropathy, GERD, HTN, HLD, obesity, polyarthritis, Pulmonary HTN, TIA, Vit D Def, presents with hypoxemia from nursing facility and lethargy. Patient unable to provide medical history. As per ED, documentation patient had an abrupt onset shortness of breath, generalized weakness, elevated heart rate, hypoxia to the 70s.     Upon chart review, patient was admitted to [facility] ICU in 2022 due to acute on chronic hypoxic hypercapnic respiratory failure. Patient was eventually weaned off ventilator and then discharged home.   (05 Oct 2023 08:23)    # ACUTE ON CHRONIC HYPOXIC HYPERCAPNEIC RESPIRATORY FAILURE , ? ASPIRATION PNA  # HX OF COPD  # CHRONIC CHF    - PATIENT IS INTUBATED ON MV  - ON MEROPENEM, F/U BCX  - CTA CHEST - ? RIGHT MIDDLE AND LOWER LOBE INFILTRATE  - ON DUONEB, SOLUMEDROL  - [ON SPIRIVA, SYMBICORT, ALBUTEROL - AT FACILITY]  - F/U ECHO  - CRITICAL CARE MANAGEMENT IN PROGRESS  - ID CONSULT    - PRECEDEX DISCONTINUED DUE TO HYPERPYREXIA    # SEPSIS S/T UTI + PNA  - ON MEROPENEM, F/U UCX AND BCX  - ID CONSULT    # ACUTE METABOLIC ENCEPHALOPATHY  - NOTED CT HEAD    # HYPERKALEMIA - IMPROVED  - S/P INSULIN + D50    # DM - LANTUS, SSI  + FS  # HTN  # HLD  # HX OF CAD  # GERD  # GI AND DVT PPX   Patient is a 63y old  Male who presents with a chief complaint of Acute on chronic hypoxemic hypercapnic respiratory failure (08 Oct 2023 02:28)    PATIENT IS SEEN AND EXAMINED IN ICU.    ALLERGIES:  Precedex (Fever)      Daily     Daily Weight in k.5 (08 Oct 2023 07:00)    VITALS:    Vital Signs Last 24 Hrs  T(C): 37.4 (08 Oct 2023 11:00), Max: 40.6 (08 Oct 2023 08:10)  T(F): 99.4 (08 Oct 2023 11:00), Max: 105 (08 Oct 2023 08:10)  HR: 64 (08 Oct 2023 11:52) (64 - 80)  BP: 112/71 (08 Oct 2023 11:00) (104/74 - 150/111)  BP(mean): 83 (08 Oct 2023 11:00) (83 - 124)  RR: 25 (08 Oct 2023 11:00) (10 - 28)  SpO2: 96% (08 Oct 2023 11:52) (84% - 100%)    Parameters below as of 08 Oct 2023 07:00  Patient On (Oxygen Delivery Method): ventilator    O2 Concentration (%): 45    LABS:    CBC Full  -  ( 08 Oct 2023 03:28 )  WBC Count : 9.13 K/uL  RBC Count : 4.92 M/uL  Hemoglobin : 13.7 g/dL  Hematocrit : 44.8 %  Platelet Count - Automated : 209 K/uL  Mean Cell Volume : 91.1 fl  Mean Cell Hemoglobin : 27.8 pg  Mean Cell Hemoglobin Concentration : 30.6 gm/dL  Auto Neutrophil # : 6.53 K/uL  Auto Lymphocyte # : 1.19 K/uL  Auto Monocyte # : 1.37 K/uL  Auto Eosinophil # : 0.00 K/uL  Auto Basophil # : 0.01 K/uL  Auto Neutrophil % : 71.6 %  Auto Lymphocyte % : 13.0 %  Auto Monocyte % : 15.0 %  Auto Eosinophil % : 0.0 %  Auto Basophil % : 0.1 %      10-08    136  |  101  |  45<H>  ----------------------------<  423<H>  4.9   |  32<H>  |  0.99    Ca    8.4      08 Oct 2023 03:28  Phos  2.7     10-08  Mg     2.6     10-08    TPro  6.8  /  Alb  2.9<L>  /  TBili  0.3  /  DBili  x   /  AST  65<H>  /  ALT  89<H>  /  AlkPhos  85  10-08    CAPILLARY BLOOD GLUCOSE      POCT Blood Glucose.: 417 mg/dL (08 Oct 2023 11:14)  POCT Blood Glucose.: 389 mg/dL (08 Oct 2023 05:59)  POCT Blood Glucose.: 363 mg/dL (07 Oct 2023 22:47)  POCT Blood Glucose.: 319 mg/dL (07 Oct 2023 17:00)    CARDIAC MARKERS ( 07 Oct 2023 03:30 )  x     / x     / 286 U/L / x     / <1.0 ng/mL      LIVER FUNCTIONS - ( 08 Oct 2023 03:28 )  Alb: 2.9 g/dL / Pro: 6.8 g/dL / ALK PHOS: 85 U/L / ALT: 89 U/L DA / AST: 65 U/L / GGT: x           Creatinine Trend: 0.99<--, 0.88<--, 0.88<--, 0.90<--  I&O's Summary    07 Oct 2023 07:01  -  08 Oct 2023 07:00  --------------------------------------------------------  IN: 1631.6 mL / OUT: 1500 mL / NET: 131.5 mL    08 Oct 2023 07:01  -  08 Oct 2023 14:01  --------------------------------------------------------  IN: 0 mL / OUT: 230 mL / NET: -230 mL        ABG - ( 08 Oct 2023 03:36 )  pH, Arterial: 7.45  pH, Blood: x     /  pCO2: 49    /  pO2: 87    / HCO3: 34    / Base Excess: 8.6   /  SaO2: 98                  Clean Catch Clean Catch (Midstream)  10-05 @ 08:30   >100,000 CFU/ml Enterococcus faecalis  --  --      .Blood Blood-Peripheral  10-05 @ 08:15   No growth at 72 Hours  --  --      .Blood Blood-Peripheral  10-05 @ 08:00   No growth at 72 Hours  --  --      .Blood Blood-Peripheral  10-05 @ 05:35   No growth at 72 Hours  --  --      .Blood Blood-Peripheral  10-05 @ 05:25   No growth at 72 Hours  --  --          MEDICATIONS:    MEDICATIONS  (STANDING):  chlorhexidine 0.12% Liquid 15 milliLiter(s) Oral Mucosa every 12 hours  fentaNYL   Infusion 2 MICROgram(s)/kG/Hr (18.7 mL/Hr) IV Continuous <Continuous>  heparin   Injectable 5000 Unit(s) SubCutaneous every 8 hours  insulin glargine Injectable (LANTUS) 25 Unit(s) SubCutaneous at bedtime  insulin lispro (ADMELOG) corrective regimen sliding scale   SubCutaneous every 6 hours  ipratropium 17 MICROgram(s) HFA Inhaler 1 Puff(s) Inhalation every 6 hours  meropenem  IVPB 1000 milliGRAM(s) IV Intermittent every 8 hours  meropenem  IVPB      methylPREDNISolone sodium succinate Injectable 40 milliGRAM(s) IV Push daily  nicotine -   7 mG/24Hr(s) Patch 1 Patch Transdermal daily  pantoprazole  Injectable 40 milliGRAM(s) IV Push daily  tamsulosin 0.4 milliGRAM(s) Oral at bedtime      MEDICATIONS  (PRN):  albuterol    90 MICROgram(s) HFA Inhaler 2 Puff(s) Inhalation every 6 hours PRN Shortness of Breath and/or Wheezing      REVIEW OF SYSTEMS:                           ALL ROS DONE [ X   ]    CONSTITUTIONAL:  LETHARGIC [   ], FEVER [   ], UNRESPONSIVE [   ]  CVS:  CP  [   ], SOB, [   ], PALPITATIONS [   ], DIZZYNESS [   ]  RS: COUGH [   ], SPUTUM [   ]  GI: ABDOMINAL PAIN [   ], NAUSEA [   ], VOMITINGS [   ], DIARRHEA [   ], CONSTIPATION [   ]  :  DYSURIA [   ], NOCTURIA [   ], INCREASED FREQUENCY [   ], DRIBLING [   ],  SKELETAL: PAINFUL JOINTS [   ], SWOLLEN JOINTS [   ], NECK ACHE [   ], LOW BACK ACHE [   ],  SKIN : ULCERS [   ], RASH [   ], ITCHING [   ]  CNS: HEAD ACHE [   ], DOUBLE VISION [   ], BLURRED VISION [   ], AMS / CONFUSION [   ], SEIZURES [   ], WEAKNESS [   ],TINGLING / NUMBNESS [   ]      PHYSICAL EXAMINATION:  GENERAL APPEARANCE: NO DISTRESS  HEENT:  NO PALLOR, NO  JVD,  NO   NODES, NECK SUPPLE  CVS: S1 +, S2 +,   RS: AEEB,  OCCASIONAL  RALES +,   NO RONCHI   ;   INTUBATED+  ABD: SOFT, NT, NO, BS +  EXT: NO PE  SKIN: WARM,   SKELETAL:  ROM ACCEPTABLE  CNS:  AAO X 0    RADIOLOGY :    RADIOLOGY AND READINGS REVIEWED    ASSESSMENT :       PLAN:  HPI:  Patient is a 64 yo male from [facility], active smoker, with a PMHx of CAD, CHF on nocturnal BIPAP, COPD ( MULTIPLE intubations in past), peripheral neuropathy, GERD, HTN, HLD, obesity, polyarthritis, Pulmonary HTN, TIA, Vit D Def, presents with hypoxemia from nursing facility and lethargy. Patient unable to provide medical history. As per ED, documentation patient had an abrupt onset shortness of breath, generalized weakness, elevated heart rate, hypoxia to the 70s.     Upon chart review, patient was admitted to [facility] ICU in 2022 due to acute on chronic hypoxic hypercapnic respiratory failure. Patient was eventually weaned off ventilator and then discharged home.   (05 Oct 2023 08:23)    # ACUTE ON CHRONIC HYPOXIC HYPERCAPNEIC RESPIRATORY FAILURE , ? ASPIRATION PNA  # HX OF COPD  # CHRONIC CHF    - PATIENT IS INTUBATED ON MV  - ON MEROPENEM, F/U BCX  - CTA CHEST - ? RIGHT MIDDLE AND LOWER LOBE INFILTRATE  - ON DUONEB, SOLUMEDROL  - [ON SPIRIVA, SYMBICORT, ALBUTEROL - AT FACILITY]  - F/U ECHO  - CRITICAL CARE MANAGEMENT IN PROGRESS  - ID CONSULT    - PRECEDEX DISCONTINUED DUE TO HYPERPYREXIA    # SEPSIS S/T UTI + PNA  - ON MEROPENEM, F/U UCX AND BCX  - ID CONSULT    # ACUTE METABOLIC ENCEPHALOPATHY  - NOTED CT HEAD    # HYPERKALEMIA - IMPROVED  - S/P INSULIN + D50    # DM - LANTUS, SSI  + FS  # HTN  # HLD  # HX OF CAD  # GERD  # GI AND DVT PPX

## 2023-10-08 NOTE — CHART NOTE - NSCHARTNOTEFT_GEN_A_CORE
Spoke to patient sister Chiquita Merrill ([phone]) at bedside with Dr. Sewell. Sister made aware that he spiked a fever due to precedex and that his temperature is coming down since the medication has been stopped. She was made aware that he didn't tolerate his trial of spontaneous breathing yesterday but will try again once he is more stable to breath on his own and doesn't have the fever. All questions were answered.

## 2023-10-08 NOTE — PROGRESS NOTE ADULT - ASSESSMENT
62 yo male from [facility], active smoker, with a PMHx of CAD, CHF on nocturnal BIPAP, COPD ( MULTIPLE intubations in past), peripheral neuropathy, GERD, HTN, HLD, obesity, polyarthritis, Pulmonary HTN, TIA, Vit D Def, presents with hypoxemia from nursing facility and lethargy and altered mental status. Upon evaluation in ED, patient afebrile, bp noted to be 124/77, tachycardic to 124bpm, and noted to be hypoxic on subsequently intubated. Patient admitted to ICU for Acute on chronic hypoxic hypercapnic respiratory failure requiring emergent intubation.    #Acute encephalopathy   #Acute on chronic hypoxic hypercapnic respiratory failure  #Sepsis 2/2 UTI  #Hyperkalemia   # Hx of COPD  #Hx of DM   # Chronic CHF   # Hx of CAD  # HTN  # HLD  # GERD     =======Neuro======  10/5 Intubated   - SAT & SBT     # Acute encephalopathy  Likely  Acute on chronic CO2 retention vs Sepsis 2/2 UTI vs CVA vs electrolyte abnormalities vs Urinary retention   p/w  lethargic upon arrival  noted to have C02 level of 105, and noted to have UTI  U-tox noted for benzos   s/p Azithromycin, ceftriaxone   CT Head: No hemorrhage  CT chest: No PE, Aspiration or infection involving the RT middle lobe and RT base.   10/5 Started meropenem 1000, solumedrol   Bcx Neg at 24hrs  - titrate vent  - c/w meropenem, solumedrol  - f/u urine cx (10/5)      =======Pulm=======  # Acute on chronic hypoxic hypercapnic respiratory failure   noted to have hx of COPD on Nocturnal bipap  patient intubated in ED, due to lethargy and hypoxemia   CT chest: No PE, Aspiration or infection involving the RT middle lobe and RT base.   CXR: Right peripheral lower lung opacity, possibly infiltrate.  10/5 Started meropenem 1000, solumedrol  Repeat CO2 levels 105>56>46>52>63 (10/7)  - c/w home nebulizers through vent   - monitor CO2 levels  - c/w meropenem, solumedrol     #Hx of COPD  patient noted to be on symbicort, albuterol and spiriva at facility  - Resume meds nebs via vent   - monitor CO2 levels  - titrate ventilator as tolerated     =======Cardiac=======  # HX of CAD   patient noted to be on ASA at home   Troponin  neg  EKG: RBBB  Trops Neg  Triglyceride 354, , CKMB wnl       #HTN   Pt has h/o HTN  hold home anti hypertensives for now       #HLD  Triglyceride 354     #Chronic CHF   patient noted to have hx of Chronic CHF  last echo noted to show EF>70%    =======Renal=======  # no acute issues   - c/w Flomax       =======ID=======  #Sepsis  Leukocytosis 22k >13.7  UA: (+) Bacteria, WBC, Leuk.... prior culture growing enterococcus  10/5 started meropenem  Consulted Dr. Tsai  Bcx: Neg at 24hrs  - c/w meropenem  - f/u urine cx (10/5)    =======Heme/Onc=======  # no acute issues    =======GI=======  #no acute issue   - NPO    =======Endo=======   #Hyperkalemia  noted to have K of 6.2  s/p 5 units of insulin and dextrose   repeat K 4.7  RESOLVED    #HX of DM  noted to be on lantus 30 units QHS  - on SSI  - q6hrs finger sticks  - c/w lantus 15    =======Skin/cath=======  # no acute issues    =======Prophylaxis======  DVT:  SQ heparin    ========Lines========  - peripheral IV lines    =======Dispo=======     62 yo male from [facility], active smoker, with a PMHx of CAD, CHF on nocturnal BIPAP, COPD ( MULTIPLE intubations in past), peripheral neuropathy, GERD, HTN, HLD, obesity, polyarthritis, Pulmonary HTN, TIA, Vit D Def, presents with hypoxemia from nursing facility and lethargy and altered mental status. Upon evaluation in ED, patient afebrile, bp noted to be 124/77, tachycardic to 124bpm, and noted to be hypoxic on subsequently intubated. Patient admitted to ICU for Acute on chronic hypoxic hypercapnic respiratory failure requiring emergent intubation.    #Acute encephalopathy   #Acute on chronic hypoxic hypercapnic respiratory failure  #Hyperthermia 2/2 Suspected Drug interaction (Precedex)   #Sepsis 2/2 UTI  # Hx of COPD  #Hx of DM   # Chronic CHF   # Hx of CAD  # HTN  # HLD  # GERD   # Hypertriglyceridemia     =======Neuro======  10/8 Intubated   - SAT & SBT   - awake and following commands  - will perform SBT today     # Acute encephalopathy  Likely  Acute on chronic CO2 retention vs Sepsis 2/2 UTI vs CVA vs electrolyte abnormalities vs Urinary retention   p/w  lethargic upon arrival  noted to have C02 level of 105, and noted to have UTI  U-tox noted for benzos   s/p Azithromycin, ceftriaxone   CT Head: No hemorrhage  CT chest: No PE, Aspiration or infection involving the RT middle lobe and RT base.   continue meropenem 1000, solumedrol   Bcx Neg at 24hrs, urine cultures noted to be growing Enterococcus faecalis   - titrate vent  - c/w meropenem, solumedrol  - f/u urine culture sensitivities        =======Pulm=======  # Acute on chronic hypoxic hypercapnic respiratory failure   noted to have hx of COPD on Nocturnal bipap  patient intubated in ED, due to lethargy and hypoxemia   CT chest: No PE, Aspiration or infection involving the RT middle lobe and RT base.   CXR: Right peripheral lower lung opacity, possibly infiltrate.  10/5 Started meropenem 1000, solumedrol  Repeat CO2 levels 105>56>46>52>63 (10/7)  - c/w home nebulizers through vent   - monitor CO2 levels  - c/w meropenem, solumedrol     #Hx of COPD  patient noted to be on symbicort, albuterol and spiriva at facility  - Resume meds nebs via vent   - monitor CO2 levels  - titrate ventilator as tolerated     =======Cardiac=======  # HX of CAD   patient noted to be on ASA at home   Troponin  neg  EKG: RBBB  Trops Neg  Triglyceride 354, , CKMB wnl       #HTN   Pt has h/o HTN  hold home anti hypertensives for now       #HLD/ Hypertriglyceridemia   Triglyceride 354   propofol discontinued  monitor triglyceride levels     #Chronic CHF   patient noted to have hx of Chronic CHF  last echo noted to show EF>70%    =======Renal=======  # no acute issues   - c/w Flomax       =======ID=======  #Sepsis  Leukocytosis 22k >13.7  UA: (+) Bacteria, WBC, Leuk.... prior culture growing enterococcus  10/5 started meropenem  Consulted Dr. Tsai  Bcx: Neg at 24hrs  - c/w meropenem  - urine cultures noted to be growing enterococcus faecalis   - f/u urine culture sensitivities     #Hyperthermia  - patient noted to be have febrile episodes Tmax of 104  - can be 2/2 drug induced 2/2 precedex vs Sepsis   - continue with Tylenol   - continue IV antibiotics  - stop precedex     =======Heme/Onc=======  # no acute issues    =======GI=======  #no acute issue   - NPO    =======Endo=======   #Hyperkalemia  RESOLVED    #HX of DM  noted to be on lantus 30 units QHS  - on SSI  - q6hrs finger sticks  - glucose noted to be in 300s  - increase lantus     =======Skin/cath=======  # no acute issues    =======Prophylaxis======  DVT:  SQ heparin    ========Lines========  - peripheral IV lines    =======Dispo=======

## 2023-10-08 NOTE — PROGRESS NOTE ADULT - SUBJECTIVE AND OBJECTIVE BOX
INTERVAL HPI/OVERNIGHT EVENTS: No acute overnight events. Pt seen at bedside, NAD, intubated and sedated    PRESSORS: [ ] YES [ ] NO  WHICH:    Antimicrobial:  meropenem  IVPB 1000 milliGRAM(s) IV Intermittent every 8 hours  meropenem  IVPB        Cardiovascular:    Pulmonary:  albuterol    90 MICROgram(s) HFA Inhaler 2 Puff(s) Inhalation every 6 hours PRN  ipratropium 17 MICROgram(s) HFA Inhaler 1 Puff(s) Inhalation every 6 hours    Hematalogic:  heparin   Injectable 5000 Unit(s) SubCutaneous every 8 hours    Other:  chlorhexidine 0.12% Liquid 15 milliLiter(s) Oral Mucosa every 12 hours  dexMEDEtomidine Infusion 0.2 MICROgram(s)/kG/Hr IV Continuous <Continuous>  fentaNYL   Infusion 1 MICROgram(s)/kG/Hr IV Continuous <Continuous>  insulin glargine Injectable (LANTUS) 15 Unit(s) SubCutaneous at bedtime  insulin lispro (ADMELOG) corrective regimen sliding scale   SubCutaneous every 6 hours  methylPREDNISolone sodium succinate Injectable 40 milliGRAM(s) IV Push daily  nicotine -   7 mG/24Hr(s) Patch 1 Patch Transdermal daily  pantoprazole  Injectable 40 milliGRAM(s) IV Push daily  tamsulosin 0.4 milliGRAM(s) Oral at bedtime    albuterol    90 MICROgram(s) HFA Inhaler 2 Puff(s) Inhalation every 6 hours PRN  chlorhexidine 0.12% Liquid 15 milliLiter(s) Oral Mucosa every 12 hours  dexMEDEtomidine Infusion 0.2 MICROgram(s)/kG/Hr IV Continuous <Continuous>  fentaNYL   Infusion 1 MICROgram(s)/kG/Hr IV Continuous <Continuous>  heparin   Injectable 5000 Unit(s) SubCutaneous every 8 hours  insulin glargine Injectable (LANTUS) 15 Unit(s) SubCutaneous at bedtime  insulin lispro (ADMELOG) corrective regimen sliding scale   SubCutaneous every 6 hours  ipratropium 17 MICROgram(s) HFA Inhaler 1 Puff(s) Inhalation every 6 hours  meropenem  IVPB 1000 milliGRAM(s) IV Intermittent every 8 hours  meropenem  IVPB      methylPREDNISolone sodium succinate Injectable 40 milliGRAM(s) IV Push daily  nicotine -   7 mG/24Hr(s) Patch 1 Patch Transdermal daily  pantoprazole  Injectable 40 milliGRAM(s) IV Push daily  tamsulosin 0.4 milliGRAM(s) Oral at bedtime    Drug Dosing Weight  Height (cm): 185.4 (06 Oct 2023 18:01)  Weight (kg): 93.5 (05 Oct 2023 10:30)  BMI (kg/m2): 27.2 (06 Oct 2023 18:01)  BSA (m2): 2.18 (06 Oct 2023 18:01)    CENTRAL LINE: [ ] YES [ ] NO  LOCATION:   DATE INSERTED:  REMOVE: [ ] YES [ ] NO  EXPLAIN:    AMOR: [ ] YES [ ] NO    DATE INSERTED:  REMOVE:  [ ] YES [ ] NO  EXPLAIN:    A-LINE:  [ ] YES [ ] NO  LOCATION:   DATE INSERTED:  REMOVE:  [ ] YES [ ] NO  EXPLAIN:    PMH -reviewed admission note, no change since admission  PAST MEDICAL & SURGICAL HISTORY:  COPD (chronic obstructive pulmonary disease)  Oxygen 2-3L at home      Stented coronary artery  2017      HLD (hyperlipidemia)      Pulmonary HTN      Type 2 diabetes mellitus without complication, with long-term current use of insulin      DM (diabetes mellitus)      CAD (coronary artery disease)      GERD (gastroesophageal reflux disease)      Insomnia      CHF (congestive heart failure)      HTN (hypertension)      Mood disorder      No significant past surgical history          ICU Vital Signs Last 24 Hrs  T(C): 37.4 (08 Oct 2023 00:00), Max: 38.3 (07 Oct 2023 17:33)  T(F): 99.4 (08 Oct 2023 00:00), Max: 101 (07 Oct 2023 17:33)  HR: 74 (08 Oct 2023 02:00) (67 - 94)  BP: 126/82 (08 Oct 2023 02:00) (96/71 - 150/111)  BP(mean): 95 (08 Oct 2023 02:00) (74 - 124)  ABP: --  ABP(mean): --  RR: 27 (08 Oct 2023 02:00) (15 - 27)  SpO2: 96% (08 Oct 2023 02:00) (84% - 99%)    O2 Parameters below as of 07 Oct 2023 16:00  Patient On (Oxygen Delivery Method): ventilator    O2 Concentration (%): 45        ABG - ( 07 Oct 2023 03:28 )  pH, Arterial: 7.39  pH, Blood: x     /  pCO2: 63    /  pO2: 77    / HCO3: 38    / Base Excess: 10.5  /  SaO2: 97                    10-06 @ 07:01  -  10-07 @ 07:00  --------------------------------------------------------  IN: 1080.8 mL / OUT: 1220 mL / NET: -139.2 mL        Mode: AC/ CMV (Assist Control/ Continuous Mandatory Ventilation)  RR (machine): 18  TV (machine): 450  FiO2: 45  PEEP: 5  ITime: 1  MAP: 12  PIP: 26      PHYSICAL EXAM:  GENERAL: NAD, Pt is awake and intubated   HEAD:  Atraumatic, Normocephalic  EYES:  PERRLA, conjunctiva and sclera clear  ENMT: No lesions. Pt is intubated   NECK:  normal appearance, No JVD   NERVOUS SYSTEM:  Pt is awake and following commands  CHEST/LUNG: No chest deformity; No rales, rhonchi, wheezing   HEART: Regular rate and rhythm; No murmurs,  ABDOMEN: Soft, Nontender, Nondistended; Bowel sounds present  EXTREMITIES:  2+ Peripheral Pulses, No clubbing, cyanosis, or edema  SKIN: (+) peripheral IV cath noted on B/L arm. No rashes or lesions;        LABS:  CBC Full  -  ( 07 Oct 2023 03:30 )  WBC Count : 13.71 K/uL  RBC Count : 4.21 M/uL  Hemoglobin : 11.7 g/dL  Hematocrit : 38.5 %  Platelet Count - Automated : 210 K/uL  Mean Cell Volume : 91.4 fl  Mean Cell Hemoglobin : 27.8 pg  Mean Cell Hemoglobin Concentration : 30.4 gm/dL  Auto Neutrophil # : 12.05 K/uL  Auto Lymphocyte # : 0.86 K/uL  Auto Monocyte # : 0.72 K/uL  Auto Eosinophil # : 0.00 K/uL  Auto Basophil # : 0.01 K/uL  Auto Neutrophil % : 87.8 %  Auto Lymphocyte % : 6.3 %  Auto Monocyte % : 5.3 %  Auto Eosinophil % : 0.0 %  Auto Basophil % : 0.1 %    10-07    137  |  98  |  33<H>  ----------------------------<  306<H>  4.7   |  36<H>  |  0.88    Ca    8.0<L>      07 Oct 2023 03:30  Phos  3.6     10-07  Mg     2.7     10-07    TPro  6.5  /  Alb  2.7<L>  /  TBili  0.2  /  DBili  x   /  AST  42<H>  /  ALT  55  /  AlkPhos  93  10-07      Urinalysis Basic - ( 07 Oct 2023 03:30 )    Color: x / Appearance: x / SG: x / pH: x  Gluc: 306 mg/dL / Ketone: x  / Bili: x / Urobili: x   Blood: x / Protein: x / Nitrite: x   Leuk Esterase: x / RBC: x / WBC x   Sq Epi: x / Non Sq Epi: x / Bacteria: x      Culture Results:   >100,000 CFU/ml Enterococcus faecalis (10-05 @ 08:30)  Culture Results:   No growth at 48 Hours (10-05 @ 08:15)  Culture Results:   No growth at 48 Hours (10-05 @ 08:00)  Culture Results:   No growth at 48 Hours (10-05 @ 05:35)  Culture Results:   No growth at 48 Hours (10-05 @ 05:25)      RADIOLOGY & ADDITIONAL STUDIES REVIEWED:  ***    [ ]GOALS OF CARE DISCUSSION WITH PATIENT/FAMILY/PROXY:    CRITICAL CARE TIME SPENT: 35 minutes INTERVAL HPI/OVERNIGHT EVENTS: Overnight patient febrile to 103.4. Pt seen at bedside, febrile to 104. Patient noted to be awake and opening eyes.     PRESSORS: [ ] YES [ ] NO  WHICH:    Antimicrobial:  meropenem  IVPB 1000 milliGRAM(s) IV Intermittent every 8 hours  meropenem  IVPB        Cardiovascular:    Pulmonary:  albuterol    90 MICROgram(s) HFA Inhaler 2 Puff(s) Inhalation every 6 hours PRN  ipratropium 17 MICROgram(s) HFA Inhaler 1 Puff(s) Inhalation every 6 hours    Hematalogic:  heparin   Injectable 5000 Unit(s) SubCutaneous every 8 hours    Other:  chlorhexidine 0.12% Liquid 15 milliLiter(s) Oral Mucosa every 12 hours  dexMEDEtomidine Infusion 0.2 MICROgram(s)/kG/Hr IV Continuous <Continuous>  fentaNYL   Infusion 1 MICROgram(s)/kG/Hr IV Continuous <Continuous>  insulin glargine Injectable (LANTUS) 15 Unit(s) SubCutaneous at bedtime  insulin lispro (ADMELOG) corrective regimen sliding scale   SubCutaneous every 6 hours  methylPREDNISolone sodium succinate Injectable 40 milliGRAM(s) IV Push daily  nicotine -   7 mG/24Hr(s) Patch 1 Patch Transdermal daily  pantoprazole  Injectable 40 milliGRAM(s) IV Push daily  tamsulosin 0.4 milliGRAM(s) Oral at bedtime    albuterol    90 MICROgram(s) HFA Inhaler 2 Puff(s) Inhalation every 6 hours PRN  chlorhexidine 0.12% Liquid 15 milliLiter(s) Oral Mucosa every 12 hours  dexMEDEtomidine Infusion 0.2 MICROgram(s)/kG/Hr IV Continuous <Continuous>  fentaNYL   Infusion 1 MICROgram(s)/kG/Hr IV Continuous <Continuous>  heparin   Injectable 5000 Unit(s) SubCutaneous every 8 hours  insulin glargine Injectable (LANTUS) 15 Unit(s) SubCutaneous at bedtime  insulin lispro (ADMELOG) corrective regimen sliding scale   SubCutaneous every 6 hours  ipratropium 17 MICROgram(s) HFA Inhaler 1 Puff(s) Inhalation every 6 hours  meropenem  IVPB 1000 milliGRAM(s) IV Intermittent every 8 hours  meropenem  IVPB      methylPREDNISolone sodium succinate Injectable 40 milliGRAM(s) IV Push daily  nicotine -   7 mG/24Hr(s) Patch 1 Patch Transdermal daily  pantoprazole  Injectable 40 milliGRAM(s) IV Push daily  tamsulosin 0.4 milliGRAM(s) Oral at bedtime    Drug Dosing Weight  Height (cm): 185.4 (06 Oct 2023 18:01)  Weight (kg): 93.5 (05 Oct 2023 10:30)  BMI (kg/m2): 27.2 (06 Oct 2023 18:01)  BSA (m2): 2.18 (06 Oct 2023 18:01)    CENTRAL LINE: [ ] YES [ ] NO  LOCATION:   DATE INSERTED:  REMOVE: [ ] YES [ ] NO  EXPLAIN:    AMOR: [ ] YES [ ] NO    DATE INSERTED:  REMOVE:  [ ] YES [ ] NO  EXPLAIN:    A-LINE:  [ ] YES [ ] NO  LOCATION:   DATE INSERTED:  REMOVE:  [ ] YES [ ] NO  EXPLAIN:    PMH -reviewed admission note, no change since admission  PAST MEDICAL & SURGICAL HISTORY:  COPD (chronic obstructive pulmonary disease)  Oxygen 2-3L at home      Stented coronary artery  2017      HLD (hyperlipidemia)      Pulmonary HTN      Type 2 diabetes mellitus without complication, with long-term current use of insulin      DM (diabetes mellitus)      CAD (coronary artery disease)      GERD (gastroesophageal reflux disease)      Insomnia      CHF (congestive heart failure)      HTN (hypertension)      Mood disorder      No significant past surgical history          ICU Vital Signs Last 24 Hrs  T(C): 37.4 (08 Oct 2023 00:00), Max: 38.3 (07 Oct 2023 17:33)  T(F): 99.4 (08 Oct 2023 00:00), Max: 101 (07 Oct 2023 17:33)  HR: 74 (08 Oct 2023 02:00) (67 - 94)  BP: 126/82 (08 Oct 2023 02:00) (96/71 - 150/111)  BP(mean): 95 (08 Oct 2023 02:00) (74 - 124)  ABP: --  ABP(mean): --  RR: 27 (08 Oct 2023 02:00) (15 - 27)  SpO2: 96% (08 Oct 2023 02:00) (84% - 99%)    O2 Parameters below as of 07 Oct 2023 16:00  Patient On (Oxygen Delivery Method): ventilator    O2 Concentration (%): 45        ABG - ( 07 Oct 2023 03:28 )  pH, Arterial: 7.39  pH, Blood: x     /  pCO2: 63    /  pO2: 77    / HCO3: 38    / Base Excess: 10.5  /  SaO2: 97                    10-06 @ 07:01  -  10-07 @ 07:00  --------------------------------------------------------  IN: 1080.8 mL / OUT: 1220 mL / NET: -139.2 mL        Mode: AC/ CMV (Assist Control/ Continuous Mandatory Ventilation)  RR (machine): 18  TV (machine): 450  FiO2: 45  PEEP: 5  ITime: 1  MAP: 12  PIP: 26      PHYSICAL EXAM:  GENERAL: NAD, Pt is awake and intubated   HEAD:  Atraumatic, Normocephalic  EYES:  PERRLA, conjunctiva and sclera clear  ENMT: No lesions. Pt is intubated   NECK:  normal appearance, No JVD   NERVOUS SYSTEM:  Pt is awake and following commands  CHEST/LUNG: No chest deformity; No rales, rhonchi, wheezing   HEART: Regular rate and rhythm; No murmurs,  ABDOMEN: Soft, Nontender, Nondistended; Bowel sounds present  EXTREMITIES:  2+ Peripheral Pulses, No clubbing, cyanosis, or edema  SKIN: (+) peripheral IV cath noted on B/L arm. No rashes or lesions;        LABS:  CBC Full  -  ( 07 Oct 2023 03:30 )  WBC Count : 13.71 K/uL  RBC Count : 4.21 M/uL  Hemoglobin : 11.7 g/dL  Hematocrit : 38.5 %  Platelet Count - Automated : 210 K/uL  Mean Cell Volume : 91.4 fl  Mean Cell Hemoglobin : 27.8 pg  Mean Cell Hemoglobin Concentration : 30.4 gm/dL  Auto Neutrophil # : 12.05 K/uL  Auto Lymphocyte # : 0.86 K/uL  Auto Monocyte # : 0.72 K/uL  Auto Eosinophil # : 0.00 K/uL  Auto Basophil # : 0.01 K/uL  Auto Neutrophil % : 87.8 %  Auto Lymphocyte % : 6.3 %  Auto Monocyte % : 5.3 %  Auto Eosinophil % : 0.0 %  Auto Basophil % : 0.1 %    10-07    137  |  98  |  33<H>  ----------------------------<  306<H>  4.7   |  36<H>  |  0.88    Ca    8.0<L>      07 Oct 2023 03:30  Phos  3.6     10-07  Mg     2.7     10-07    TPro  6.5  /  Alb  2.7<L>  /  TBili  0.2  /  DBili  x   /  AST  42<H>  /  ALT  55  /  AlkPhos  93  10-07      Urinalysis Basic - ( 07 Oct 2023 03:30 )    Color: x / Appearance: x / SG: x / pH: x  Gluc: 306 mg/dL / Ketone: x  / Bili: x / Urobili: x   Blood: x / Protein: x / Nitrite: x   Leuk Esterase: x / RBC: x / WBC x   Sq Epi: x / Non Sq Epi: x / Bacteria: x      Culture Results:   >100,000 CFU/ml Enterococcus faecalis (10-05 @ 08:30)  Culture Results:   No growth at 48 Hours (10-05 @ 08:15)  Culture Results:   No growth at 48 Hours (10-05 @ 08:00)  Culture Results:   No growth at 48 Hours (10-05 @ 05:35)  Culture Results:   No growth at 48 Hours (10-05 @ 05:25)      RADIOLOGY & ADDITIONAL STUDIES REVIEWED:  ***    [ ]GOALS OF CARE DISCUSSION WITH PATIENT/FAMILY/PROXY:    CRITICAL CARE TIME SPENT: 35 minutes

## 2023-10-09 LAB
ALBUMIN SERPL ELPH-MCNC: 2.6 G/DL — LOW (ref 3.5–5)
ALP SERPL-CCNC: 76 U/L — SIGNIFICANT CHANGE UP (ref 40–120)
ALT FLD-CCNC: 111 U/L DA — HIGH (ref 10–60)
ANION GAP SERPL CALC-SCNC: 2 MMOL/L — LOW (ref 5–17)
AST SERPL-CCNC: 48 U/L — HIGH (ref 10–40)
BASOPHILS # BLD AUTO: 0.03 K/UL — SIGNIFICANT CHANGE UP (ref 0–0.2)
BASOPHILS NFR BLD AUTO: 0.2 % — SIGNIFICANT CHANGE UP (ref 0–2)
BILIRUB SERPL-MCNC: 0.3 MG/DL — SIGNIFICANT CHANGE UP (ref 0.2–1.2)
BUN SERPL-MCNC: 36 MG/DL — HIGH (ref 7–18)
CALCIUM SERPL-MCNC: 8.2 MG/DL — LOW (ref 8.4–10.5)
CHLORIDE SERPL-SCNC: 106 MMOL/L — SIGNIFICANT CHANGE UP (ref 96–108)
CO2 SERPL-SCNC: 36 MMOL/L — HIGH (ref 22–31)
CREAT SERPL-MCNC: 0.69 MG/DL — SIGNIFICANT CHANGE UP (ref 0.5–1.3)
EGFR: 105 ML/MIN/1.73M2 — SIGNIFICANT CHANGE UP
EOSINOPHIL # BLD AUTO: 0 K/UL — SIGNIFICANT CHANGE UP (ref 0–0.5)
EOSINOPHIL NFR BLD AUTO: 0 % — SIGNIFICANT CHANGE UP (ref 0–6)
GLUCOSE BLDC GLUCOMTR-MCNC: 190 MG/DL — HIGH (ref 70–99)
GLUCOSE BLDC GLUCOMTR-MCNC: 195 MG/DL — HIGH (ref 70–99)
GLUCOSE BLDC GLUCOMTR-MCNC: 230 MG/DL — HIGH (ref 70–99)
GLUCOSE BLDC GLUCOMTR-MCNC: 235 MG/DL — HIGH (ref 70–99)
GLUCOSE SERPL-MCNC: 219 MG/DL — HIGH (ref 70–99)
HCT VFR BLD CALC: 46.2 % — SIGNIFICANT CHANGE UP (ref 39–50)
HGB BLD-MCNC: 14 G/DL — SIGNIFICANT CHANGE UP (ref 13–17)
IMM GRANULOCYTES NFR BLD AUTO: 0.4 % — SIGNIFICANT CHANGE UP (ref 0–0.9)
LYMPHOCYTES # BLD AUTO: 1.71 K/UL — SIGNIFICANT CHANGE UP (ref 1–3.3)
LYMPHOCYTES # BLD AUTO: 11.4 % — LOW (ref 13–44)
MAGNESIUM SERPL-MCNC: 2.8 MG/DL — HIGH (ref 1.6–2.6)
MCHC RBC-ENTMCNC: 28.3 PG — SIGNIFICANT CHANGE UP (ref 27–34)
MCHC RBC-ENTMCNC: 30.3 GM/DL — LOW (ref 32–36)
MCV RBC AUTO: 93.3 FL — SIGNIFICANT CHANGE UP (ref 80–100)
MONOCYTES # BLD AUTO: 2.13 K/UL — HIGH (ref 0–0.9)
MONOCYTES NFR BLD AUTO: 14.1 % — HIGH (ref 2–14)
NEUTROPHILS # BLD AUTO: 11.13 K/UL — HIGH (ref 1.8–7.4)
NEUTROPHILS NFR BLD AUTO: 73.9 % — SIGNIFICANT CHANGE UP (ref 43–77)
NRBC # BLD: 0 /100 WBCS — SIGNIFICANT CHANGE UP (ref 0–0)
PHOSPHATE SERPL-MCNC: 2.8 MG/DL — SIGNIFICANT CHANGE UP (ref 2.5–4.5)
PLATELET # BLD AUTO: 177 K/UL — SIGNIFICANT CHANGE UP (ref 150–400)
POTASSIUM SERPL-MCNC: 4.3 MMOL/L — SIGNIFICANT CHANGE UP (ref 3.5–5.3)
POTASSIUM SERPL-SCNC: 4.3 MMOL/L — SIGNIFICANT CHANGE UP (ref 3.5–5.3)
PROT SERPL-MCNC: 6.5 G/DL — SIGNIFICANT CHANGE UP (ref 6–8.3)
RBC # BLD: 4.95 M/UL — SIGNIFICANT CHANGE UP (ref 4.2–5.8)
RBC # FLD: 12.7 % — SIGNIFICANT CHANGE UP (ref 10.3–14.5)
SODIUM SERPL-SCNC: 144 MMOL/L — SIGNIFICANT CHANGE UP (ref 135–145)
WBC # BLD: 15.28 K/UL — HIGH (ref 3.8–10.5)
WBC # FLD AUTO: 15.28 K/UL — HIGH (ref 3.8–10.5)

## 2023-10-09 RX ORDER — INSULIN GLARGINE 100 [IU]/ML
30 INJECTION, SOLUTION SUBCUTANEOUS AT BEDTIME
Refills: 0 | Status: DISCONTINUED | OUTPATIENT
Start: 2023-10-09 | End: 2023-10-11

## 2023-10-09 RX ORDER — ASPIRIN/CALCIUM CARB/MAGNESIUM 324 MG
81 TABLET ORAL DAILY
Refills: 0 | Status: DISCONTINUED | OUTPATIENT
Start: 2023-10-09 | End: 2023-10-13

## 2023-10-09 RX ORDER — INSULIN LISPRO 100/ML
15 VIAL (ML) SUBCUTANEOUS EVERY 6 HOURS
Refills: 0 | Status: DISCONTINUED | OUTPATIENT
Start: 2023-10-09 | End: 2023-10-11

## 2023-10-09 RX ORDER — CHLORHEXIDINE GLUCONATE 213 G/1000ML
1 SOLUTION TOPICAL
Refills: 0 | Status: DISCONTINUED | OUTPATIENT
Start: 2023-10-09 | End: 2023-10-13

## 2023-10-09 RX ORDER — IPRATROPIUM/ALBUTEROL SULFATE 18-103MCG
3 AEROSOL WITH ADAPTER (GRAM) INHALATION EVERY 6 HOURS
Refills: 0 | Status: DISCONTINUED | OUTPATIENT
Start: 2023-10-09 | End: 2023-10-10

## 2023-10-09 RX ADMIN — Medication 2: at 23:05

## 2023-10-09 RX ADMIN — Medication 15 UNIT(S): at 17:03

## 2023-10-09 RX ADMIN — Medication 4: at 12:46

## 2023-10-09 RX ADMIN — Medication 81 MILLIGRAM(S): at 12:45

## 2023-10-09 RX ADMIN — CHLORHEXIDINE GLUCONATE 15 MILLILITER(S): 213 SOLUTION TOPICAL at 06:04

## 2023-10-09 RX ADMIN — Medication 1 PUFF(S): at 08:26

## 2023-10-09 RX ADMIN — HEPARIN SODIUM 5000 UNIT(S): 5000 INJECTION INTRAVENOUS; SUBCUTANEOUS at 13:18

## 2023-10-09 RX ADMIN — CHLORHEXIDINE GLUCONATE 1 APPLICATION(S): 213 SOLUTION TOPICAL at 16:55

## 2023-10-09 RX ADMIN — Medication 400 MILLIGRAM(S): at 00:06

## 2023-10-09 RX ADMIN — Medication 1 PATCH: at 12:45

## 2023-10-09 RX ADMIN — Medication 10 UNIT(S): at 12:47

## 2023-10-09 RX ADMIN — Medication 2: at 06:04

## 2023-10-09 RX ADMIN — MEROPENEM 100 MILLIGRAM(S): 1 INJECTION INTRAVENOUS at 06:06

## 2023-10-09 RX ADMIN — ALBUTEROL 2 PUFF(S): 90 AEROSOL, METERED ORAL at 03:32

## 2023-10-09 RX ADMIN — Medication 1 PUFF(S): at 03:33

## 2023-10-09 RX ADMIN — Medication 4: at 17:02

## 2023-10-09 RX ADMIN — HEPARIN SODIUM 5000 UNIT(S): 5000 INJECTION INTRAVENOUS; SUBCUTANEOUS at 06:05

## 2023-10-09 RX ADMIN — Medication 3 MILLILITER(S): at 21:02

## 2023-10-09 RX ADMIN — Medication 1 PATCH: at 06:54

## 2023-10-09 RX ADMIN — PANTOPRAZOLE SODIUM 40 MILLIGRAM(S): 20 TABLET, DELAYED RELEASE ORAL at 12:45

## 2023-10-09 RX ADMIN — Medication 3 MILLILITER(S): at 15:20

## 2023-10-09 RX ADMIN — MEROPENEM 100 MILLIGRAM(S): 1 INJECTION INTRAVENOUS at 13:18

## 2023-10-09 RX ADMIN — Medication 1 PATCH: at 12:12

## 2023-10-09 RX ADMIN — FENTANYL CITRATE 18.7 MICROGRAM(S)/KG/HR: 50 INJECTION INTRAVENOUS at 00:39

## 2023-10-09 RX ADMIN — HEPARIN SODIUM 5000 UNIT(S): 5000 INJECTION INTRAVENOUS; SUBCUTANEOUS at 22:55

## 2023-10-09 RX ADMIN — Medication 1000 MILLIGRAM(S): at 05:51

## 2023-10-09 RX ADMIN — INSULIN GLARGINE 30 UNIT(S): 100 INJECTION, SOLUTION SUBCUTANEOUS at 23:06

## 2023-10-09 RX ADMIN — MEROPENEM 100 MILLIGRAM(S): 1 INJECTION INTRAVENOUS at 22:55

## 2023-10-09 RX ADMIN — Medication 1 PATCH: at 19:10

## 2023-10-09 RX ADMIN — Medication 40 MILLIGRAM(S): at 06:05

## 2023-10-09 RX ADMIN — Medication 10 UNIT(S): at 06:05

## 2023-10-09 NOTE — PROGRESS NOTE ADULT - ASSESSMENT
62 yo male from [facility], active smoker, with a PMHx of CAD, CHF on nocturnal BIPAP, COPD ( MULTIPLE intubations in past), peripheral neuropathy, GERD, HTN, HLD, obesity, polyarthritis, Pulmonary HTN, TIA, Vit D Def, presents with hypoxemia from nursing facility and lethargy and altered mental status. Upon evaluation in ED, patient afebrile, bp noted to be 124/77, tachycardic to 124bpm, and noted to be hypoxic on subsequently intubated. Patient admitted to ICU for Acute on chronic hypoxic hypercapnic respiratory failure requiring emergent intubation.    #Acute encephalopathy   #Acute on chronic hypoxic hypercapnic respiratory failure  #Hyperthermia 2/2 Suspected Drug interaction (Precedex)   #Sepsis 2/2 UTI  # Hx of COPD  #Hx of DM   # Chronic CHF   # Hx of CAD  # HTN  # HLD  # GERD   # Hypertriglyceridemia     =======Neuro======  Analgesic / sedation   10/8 Intubated for acute hypoxic and hypercapnic respiratory failure   10/9 extubated , fentanyl dripped stropped     # Acute encephalopathy  Likely  Acute on chronic CO2 retention vs Sepsis 2/2 UTI vs CVA vs electrolyte abnormalities vs Urinary retention   p/w  lethargic upon arrival  noted to have C02 level of 105, and noted to have UTI  U-tox noted for benzos   s/p Azithromycin, ceftriaxone   CT Head: No hemorrhage     ======Cardiac=======  # HX of CAD   patient noted to be on ASA at home, continue with ASA  Troponin  neg  EKG: RBBB  Trops Neg  Triglyceride 354, , CKMB wnl     #HTN   Pt has h/o HTN  hold home anti hypertensives for now     #HLD/ Hypertriglyceridemia   Triglyceride 354   propofol discontinued  monitor triglyceride levels     #Chronic CHF   patient noted to have hx of Chronic CHF  last echo noted to show EF>70%    =======Pulm=======  # Acute on chronic hypoxic hypercapnic respiratory failure   noted to have hx of COPD on Nocturnal bipap  patient intubated in ED, due to lethargy and hypoxemia   CT chest: No PE, Aspiration or infection involving the RT middle lobe and RT base.   CXR: Right peripheral lower lung opacity, possibly infiltrate.  c/w solumedrol  - c/w home nebulizers and nocturnal vent   - monitor CO2 levels  f/u repeat bcx  f/u with MRSA swab       #Hx of COPD  patient noted to be on symbicort, albuterol and spiriva at facility  - Resume meds nebs via vent   - monitor CO2 levels  - titrate ventilator as tolerated     =======Renal=======  # no acute issues   - c/w Flomax    =======ID=======  #Sepsis  Leukocytosis 22k >13.7  UA: (+) Bacteria, WBC, culture growing enterococcus  10/5 started meropenem ( on 5th day)  Consulted Dr. Tsai  Bcx: Neg at 24hrs    #Hyperthermia  - patient noted to be have febrile episodes Tmax of 104  - can be 2/2 drug induced 2/2 precedex vs Sepsis   - continue with Tylenol   - continue IV antibiotics  - stop precedex   - RESOLVED  =======Heme/Onc=======  # no acute issues    =======GI=======  #no acute issue   - NPO    =======Endo=======   #Hyperkalemia  RESOLVED    #HX of DM  On lantus 25 and amaolog 10.   Last sugar 230     =======Skin/cath=======  # no acute issues    =======Prophylaxis======  DVT:  SQ heparin    ========Lines========  - peripheral IV lines         64 yo male from [facility], active smoker, with a PMHx of CAD, CHF on nocturnal BIPAP, COPD ( MULTIPLE intubations in past), peripheral neuropathy, GERD, HTN, HLD, obesity, polyarthritis, Pulmonary HTN, TIA, Vit D Def, presents with hypoxemia from nursing facility and lethargy and altered mental status. Upon evaluation in ED, patient afebrile, bp noted to be 124/77, tachycardic to 124bpm, and noted to be hypoxic on subsequently intubated. Patient admitted to ICU for Acute on chronic hypoxic hypercapnic respiratory failure requiring emergent intubation.    #Acute encephalopathy   #Acute on chronic hypoxic hypercapnic respiratory failure  #Hyperthermia 2/2 Suspected Drug interaction (Precedex)   #Sepsis 2/2 UTI  # Hx of COPD  #Hx of DM   # Chronic CHF   # Hx of CAD  # HTN  # HLD  # GERD   # Hypertriglyceridemia     =======Neuro======  Analgesic / sedation   10/8 Intubated for acute hypoxic and hypercapnic respiratory failure   10/9 extubated , fentanyl dripped stropped     # Acute encephalopathy  Likely  Acute on chronic CO2 retention vs Sepsis 2/2 UTI vs CVA vs electrolyte abnormalities vs Urinary retention   p/w  lethargic upon arrival  noted to have C02 level of 105, and noted to have UTI  U-tox noted for benzos   s/p Azithromycin, ceftriaxone   CT Head: No hemorrhage     ======Cardiac=======  # HX of CAD   patient noted to be on ASA at home, continue with ASA  Troponin  neg  EKG: RBBB  Trops Neg  Triglyceride 354, , CKMB wnl     #HTN   Pt has h/o HTN  hold home anti hypertensives for now     #HLD/ Hypertriglyceridemia   Triglyceride 354   propofol discontinued  monitor triglyceride levels     #Chronic CHF   patient noted to have hx of Chronic CHF  last echo noted to show EF>70%    =======Pulm=======  # Acute on chronic hypoxic hypercapnic respiratory failure   noted to have hx of COPD on Nocturnal bipap  patient intubated in ED, due to lethargy and hypoxemia   CT chest: No PE, Aspiration or infection involving the RT middle lobe and RT base.   CXR: Right peripheral lower lung opacity, possibly infiltrate.  c/w solumedrol  - c/w home nebulizers and nocturnal vent   - monitor CO2 levels  f/u repeat bcx  f/u with MRSA swab       #Hx of COPD  patient noted to be on symbicort, albuterol and spiriva at facility  - Resume meds nebs via vent   - monitor CO2 levels  - titrate ventilator as tolerated     =======Renal=======  # no acute issues   - c/w Flomax    =======ID=======  #Sepsis  Leukocytosis 22k >13.7  UA: (+) Bacteria, WBC, culture growing enterococcus  10/5 started meropenem ( on 5th day)  Consulted Dr. Tsai  Bcx: Neg at 24hrs    #Hyperthermia  - patient noted to be have febrile episodes Tmax of 104  - can be 2/2 drug induced 2/2 precedex vs Sepsis   - continue with Tylenol   - continue IV antibiotics  - stop precedex   - RESOLVED  =======Heme/Onc=======  # no acute issues    =======GI=======  #no acute issue   - NPO    =======Endo=======   #Hyperkalemia  RESOLVED    #HX of DM  increase lantus 30 and amaolog 15.   Last sugar 230     =======Skin/cath=======  # no acute issues    =======Prophylaxis======  DVT:  SQ heparin    ========Lines========  - peripheral IV lines

## 2023-10-09 NOTE — PROGRESS NOTE ADULT - SUBJECTIVE AND OBJECTIVE BOX
INTERVAL HPI/OVERNIGHT EVENTS: Patient spiked a fever of 104 overnight. Stopped the precedex and given iv tylenols Fever dropped to 101 and then to 99.5. Seen patient at bedside. Moderately altered and follows some commands.       PRESSORS: [ ] YES [ ] NO  WHICH:    ANTIBIOTICS:                  DATE STARTED:  ANTIBIOTICS:                  DATE STARTED:    Antimicrobial:  meropenem  IVPB 1000 milliGRAM(s) IV Intermittent every 8 hours  meropenem  IVPB        Cardiovascular:    Pulmonary:  albuterol/ipratropium for Nebulization 3 milliLiter(s) Nebulizer every 6 hours    Hematalogic:  aspirin  chewable 81 milliGRAM(s) Oral daily  heparin   Injectable 5000 Unit(s) SubCutaneous every 8 hours    Other:  chlorhexidine 2% Cloths 1 Application(s) Topical <User Schedule>  insulin glargine Injectable (LANTUS) 25 Unit(s) SubCutaneous at bedtime  insulin lispro (ADMELOG) corrective regimen sliding scale   SubCutaneous every 6 hours  insulin lispro Injectable (ADMELOG) 10 Unit(s) SubCutaneous every 6 hours  methylPREDNISolone sodium succinate Injectable 40 milliGRAM(s) IV Push daily  nicotine -   7 mG/24Hr(s) Patch 1 Patch Transdermal daily  pantoprazole  Injectable 40 milliGRAM(s) IV Push daily  tamsulosin 0.4 milliGRAM(s) Oral at bedtime    albuterol/ipratropium for Nebulization 3 milliLiter(s) Nebulizer every 6 hours  aspirin  chewable 81 milliGRAM(s) Oral daily  chlorhexidine 2% Cloths 1 Application(s) Topical <User Schedule>  heparin   Injectable 5000 Unit(s) SubCutaneous every 8 hours  insulin glargine Injectable (LANTUS) 25 Unit(s) SubCutaneous at bedtime  insulin lispro (ADMELOG) corrective regimen sliding scale   SubCutaneous every 6 hours  insulin lispro Injectable (ADMELOG) 10 Unit(s) SubCutaneous every 6 hours  meropenem  IVPB 1000 milliGRAM(s) IV Intermittent every 8 hours  meropenem  IVPB      methylPREDNISolone sodium succinate Injectable 40 milliGRAM(s) IV Push daily  nicotine -   7 mG/24Hr(s) Patch 1 Patch Transdermal daily  pantoprazole  Injectable 40 milliGRAM(s) IV Push daily  tamsulosin 0.4 milliGRAM(s) Oral at bedtime    Drug Dosing Weight  Height (cm): 185.4 (06 Oct 2023 18:01)  Weight (kg): 93.5 (05 Oct 2023 10:30)  BMI (kg/m2): 27.2 (06 Oct 2023 18:01)  BSA (m2): 2.18 (06 Oct 2023 18:01)    PHYSICAL EXAM:  GENERAL: NAD  EYES: EOMI, PERRLA  NECK: Supple, No JVD; Normal thyroid; Trachea midline: No LAD   CHEST/LUNG: No rales, rhonchi, wheezing, breath sounds present bilaterally  HEART: Regular rate and rhythm; No murmurs, no gallops  ABDOMEN: Soft, Nontender, Nondistended; Bowel sounds present, no pain or masses on palpation  : voiding well, Amor in place, 30 cc in an hour   EXTREMITIES:  2+ Peripheral Pulses, No clubbing, cyanosis, or edema  SKIN: warm, intact, no lesions     LINES/DRAINS/DEVICES  CENTRAL LINE: [ ] YES [x ] NO  LOCATION:     AMOR: [ x] YES [ ] NO     A-LINE:  [ ] YES [x ] NO  LOCATION:       ICU Vital Signs Last 24 Hrs  T(C): 37.7 (09 Oct 2023 13:00), Max: 38.5 (09 Oct 2023 01:00)  T(F): 99.9 (09 Oct 2023 13:00), Max: 101.3 (09 Oct 2023 01:00)  HR: 76 (09 Oct 2023 13:00) (61 - 102)  BP: 142/83 (09 Oct 2023 13:00) (101/69 - 147/86)  BP(mean): 98 (09 Oct 2023 13:00) (74 - 103)  ABP: --  ABP(mean): --  RR: 22 (09 Oct 2023 13:00) (0 - 26)  SpO2: 92% (09 Oct 2023 13:00) (88% - 98%)    O2 Parameters below as of 08 Oct 2023 18:00  Patient On (Oxygen Delivery Method): ventilator            ABG - ( 08 Oct 2023 03:36 )  pH, Arterial: 7.45  pH, Blood: x     /  pCO2: 49    /  pO2: 87    / HCO3: 34    / Base Excess: 8.6   /  SaO2: 98                    10-08 @ 07:01  -  10-09 @ 07:00  --------------------------------------------------------  IN: 1415.7 mL / OUT: 1450 mL / NET: -34.3 mL        Mode: CPAP with PS  FiO2: 40  PEEP: 5  PS: 8  ITime: 1  MAP: 9  PIP: 19        LABS:  CBC Full  -  ( 09 Oct 2023 04:22 )  WBC Count : 15.28 K/uL  RBC Count : 4.95 M/uL  Hemoglobin : 14.0 g/dL  Hematocrit : 46.2 %  Platelet Count - Automated : 177 K/uL  Mean Cell Volume : 93.3 fl  Mean Cell Hemoglobin : 28.3 pg  Mean Cell Hemoglobin Concentration : 30.3 gm/dL  Auto Neutrophil # : 11.13 K/uL  Auto Lymphocyte # : 1.71 K/uL  Auto Monocyte # : 2.13 K/uL  Auto Eosinophil # : 0.00 K/uL  Auto Basophil # : 0.03 K/uL  Auto Neutrophil % : 73.9 %  Auto Lymphocyte % : 11.4 %  Auto Monocyte % : 14.1 %  Auto Eosinophil % : 0.0 %  Auto Basophil % : 0.2 %    10-09    144  |  106  |  36<H>  ----------------------------<  219<H>  4.3   |  36<H>  |  0.69    Ca    8.2<L>      09 Oct 2023 04:22  Phos  2.8     10-09  Mg     2.8     10-09    TPro  6.5  /  Alb  2.6<L>  /  TBili  0.3  /  DBili  x   /  AST  48<H>  /  ALT  111<H>  /  AlkPhos  76  10-09      Urinalysis Basic - ( 09 Oct 2023 04:22 )    Color: x / Appearance: x / SG: x / pH: x  Gluc: 219 mg/dL / Ketone: x  / Bili: x / Urobili: x   Blood: x / Protein: x / Nitrite: x   Leuk Esterase: x / RBC: x / WBC x   Sq Epi: x / Non Sq Epi: x / Bacteria: x          RADIOLOGY & ADDITIONAL STUDIES REVIEWED DURING TEAM ROUNDS    [ ]GOALS OF CARE DISCUSSION WITH PATIENT/FAMILY/PROXY:    CRITICAL CARE TIME SPENT: 35 minutes

## 2023-10-09 NOTE — PROGRESS NOTE ADULT - ASSESSMENT
UTI  Fevers  Leukocytosis      Plan - Cont Meropenem 1 gm iv q8hrs  monitor temps  Time spent - 30 mins.

## 2023-10-09 NOTE — PROGRESS NOTE ADULT - SUBJECTIVE AND OBJECTIVE BOX
ICU VISIT  63y Male    Meds:  meropenem  IVPB 1000 milliGRAM(s) IV Intermittent every 8 hours  meropenem  IVPB        Allergies    No Known Allergies    Intolerances        VITALS:  Vital Signs Last 24 Hrs  T(C): 37.9 (09 Oct 2023 18:00), Max: 38.5 (09 Oct 2023 01:00)  T(F): 100.2 (09 Oct 2023 18:00), Max: 101.3 (09 Oct 2023 01:00)  HR: 79 (09 Oct 2023 18:00) (61 - 87)  BP: 145/73 (09 Oct 2023 18:00) (102/64 - 147/86)  BP(mean): 95 (09 Oct 2023 18:00) (74 - 103)  RR: 12 (09 Oct 2023 18:00) (0 - 24)  SpO2: 87% (09 Oct 2023 18:00) (85% - 98%)    Parameters below as of 09 Oct 2023 15:05      O2 Concentration (%): 40    LABS/DIAGNOSTIC TESTS:                          14.0   15.28 )-----------( 177      ( 09 Oct 2023 04:22 )             46.2         10-09    144  |  106  |  36<H>  ----------------------------<  219<H>  4.3   |  36<H>  |  0.69    Ca    8.2<L>      09 Oct 2023 04:22  Phos  2.8     10-09  Mg     2.8     10-09    TPro  6.5  /  Alb  2.6<L>  /  TBili  0.3  /  DBili  x   /  AST  48<H>  /  ALT  111<H>  /  AlkPhos  76  10-09      LIVER FUNCTIONS - ( 09 Oct 2023 04:22 )  Alb: 2.6 g/dL / Pro: 6.5 g/dL / ALK PHOS: 76 U/L / ALT: 111 U/L DA / AST: 48 U/L / GGT: x             CULTURES: Clean Catch Clean Catch (Midstream)  10-05 @ 08:30   >100,000 CFU/ml Enterococcus faecalis  --  Enterococcus faecalis      .Blood Blood-Peripheral  10-05 @ 08:15   No growth at 4 days  --  --      .Blood Blood-Peripheral  10-05 @ 08:00   No growth at 4 days  --  --      .Blood Blood-Peripheral  10-05 @ 05:35   No growth at 4 days  --  --      .Blood Blood-Peripheral  10-05 @ 05:25   No growth at 4 days  --  --            RADIOLOGY:      ROS:  [  ] UNABLE TO ELICIT ICU VISIT  63y Male who has been extubated but remains in the ICU for now, he is confused and not answering all questions appropriately, he is still febrile despite being on Meropenem , his only positive culture was his urine for Enterococcus, he has some coughing in front of me but does appear SOB, he denies any nausea, vomiting , diarrhea or abdominal pain. He had a Tmax of 101.3 and still has an elevated WBC count also.    Meds:  meropenem  IVPB 1000 milliGRAM(s) IV Intermittent every 8 hours  meropenem  IVPB        Allergies    No Known Allergies    Intolerances        VITALS:  Vital Signs Last 24 Hrs  T(C): 37.9 (09 Oct 2023 18:00), Max: 38.5 (09 Oct 2023 01:00)  T(F): 100.2 (09 Oct 2023 18:00), Max: 101.3 (09 Oct 2023 01:00)  HR: 79 (09 Oct 2023 18:00) (61 - 87)  BP: 145/73 (09 Oct 2023 18:00) (102/64 - 147/86)  BP(mean): 95 (09 Oct 2023 18:00) (74 - 103)  RR: 12 (09 Oct 2023 18:00) (0 - 24)  SpO2: 87% (09 Oct 2023 18:00) (85% - 98%)    Parameters below as of 09 Oct 2023 15:05      O2 Concentration (%): 40    LABS/DIAGNOSTIC TESTS:                          14.0   15.28 )-----------( 177      ( 09 Oct 2023 04:22 )             46.2         10-09    144  |  106  |  36<H>  ----------------------------<  219<H>  4.3   |  36<H>  |  0.69    Ca    8.2<L>      09 Oct 2023 04:22  Phos  2.8     10-09  Mg     2.8     10-09    TPro  6.5  /  Alb  2.6<L>  /  TBili  0.3  /  DBili  x   /  AST  48<H>  /  ALT  111<H>  /  AlkPhos  76  10-09      LIVER FUNCTIONS - ( 09 Oct 2023 04:22 )  Alb: 2.6 g/dL / Pro: 6.5 g/dL / ALK PHOS: 76 U/L / ALT: 111 U/L DA / AST: 48 U/L / GGT: x             CULTURES: Clean Catch Clean Catch (Midstream)  10-05 @ 08:30   >100,000 CFU/ml Enterococcus faecalis  --  Enterococcus faecalis      .Blood Blood-Peripheral  10-05 @ 08:15   No growth at 4 days  --  --      .Blood Blood-Peripheral  10-05 @ 08:00   No growth at 4 days  --  --      .Blood Blood-Peripheral  10-05 @ 05:35   No growth at 4 days  --  --      .Blood Blood-Peripheral  10-05 @ 05:25   No growth at 4 days  --  --            RADIOLOGY:      ROS:  [  ] UNABLE TO ELICIT, limited

## 2023-10-09 NOTE — CONSULT NOTE ADULT - REASON FOR ADMISSION
Acute on chronic hypoxemic hypercapnic respiratory failure

## 2023-10-09 NOTE — PROGRESS NOTE ADULT - SUBJECTIVE AND OBJECTIVE BOX
Patient is a 63y old  Male who presents with a chief complaint of Acute on chronic hypoxemic hypercapnic respiratory failure (09 Oct 2023 19:07)    PATIENT IS SEEN AND EXAMINED IN ICU.    ALLERGIES:  No Known Allergies      VITALS:    Vital Signs Last 24 Hrs  T(C): 38 (09 Oct 2023 19:00), Max: 38.5 (09 Oct 2023 01:00)  T(F): 100.4 (09 Oct 2023 19:00), Max: 101.3 (09 Oct 2023 01:00)  HR: 83 (09 Oct 2023 20:48) (61 - 91)  BP: 128/66 (09 Oct 2023 19:00) (103/65 - 147/86)  BP(mean): 83 (09 Oct 2023 19:00) (77 - 103)  RR: 21 (09 Oct 2023 19:00) (0 - 24)  SpO2: 89% (09 Oct 2023 20:48) (85% - 98%)    Parameters below as of 09 Oct 2023 15:05      O2 Concentration (%): 40    LABS:    CBC Full  -  ( 09 Oct 2023 04:22 )  WBC Count : 15.28 K/uL  RBC Count : 4.95 M/uL  Hemoglobin : 14.0 g/dL  Hematocrit : 46.2 %  Platelet Count - Automated : 177 K/uL  Mean Cell Volume : 93.3 fl  Mean Cell Hemoglobin : 28.3 pg  Mean Cell Hemoglobin Concentration : 30.3 gm/dL  Auto Neutrophil # : 11.13 K/uL  Auto Lymphocyte # : 1.71 K/uL  Auto Monocyte # : 2.13 K/uL  Auto Eosinophil # : 0.00 K/uL  Auto Basophil # : 0.03 K/uL  Auto Neutrophil % : 73.9 %  Auto Lymphocyte % : 11.4 %  Auto Monocyte % : 14.1 %  Auto Eosinophil % : 0.0 %  Auto Basophil % : 0.2 %      10-09    144  |  106  |  36<H>  ----------------------------<  219<H>  4.3   |  36<H>  |  0.69    Ca    8.2<L>      09 Oct 2023 04:22  Phos  2.8     10-09  Mg     2.8     10-09    TPro  6.5  /  Alb  2.6<L>  /  TBili  0.3  /  DBili  x   /  AST  48<H>  /  ALT  111<H>  /  AlkPhos  76  10-09    CAPILLARY BLOOD GLUCOSE      POCT Blood Glucose.: 190 mg/dL (09 Oct 2023 22:49)  POCT Blood Glucose.: 235 mg/dL (09 Oct 2023 16:53)  POCT Blood Glucose.: 230 mg/dL (09 Oct 2023 12:44)  POCT Blood Glucose.: 195 mg/dL (09 Oct 2023 05:57)  POCT Blood Glucose.: 252 mg/dL (08 Oct 2023 23:16)        LIVER FUNCTIONS - ( 09 Oct 2023 04:22 )  Alb: 2.6 g/dL / Pro: 6.5 g/dL / ALK PHOS: 76 U/L / ALT: 111 U/L DA / AST: 48 U/L / GGT: x           Creatinine Trend: 0.69<--, 0.99<--, 0.88<--, 0.88<--, 0.90<--  I&O's Summary    08 Oct 2023 07:01  -  09 Oct 2023 07:00  --------------------------------------------------------  IN: 1415.7 mL / OUT: 1450 mL / NET: -34.3 mL    09 Oct 2023 07:01  -  09 Oct 2023 23:03  --------------------------------------------------------  IN: 96.8 mL / OUT: 500 mL / NET: -403.2 mL        ABG - ( 08 Oct 2023 03:36 )  pH, Arterial: 7.45  pH, Blood: x     /  pCO2: 49    /  pO2: 87    / HCO3: 34    / Base Excess: 8.6   /  SaO2: 98                  Clean Catch Clean Catch (Midstream)  10-05 @ 08:30   >100,000 CFU/ml Enterococcus faecalis  --  Enterococcus faecalis      .Blood Blood-Peripheral  10-05 @ 08:15   No growth at 4 days  --  --      .Blood Blood-Peripheral  10-05 @ 08:00   No growth at 4 days  --  --      .Blood Blood-Peripheral  10-05 @ 05:35   No growth at 4 days  --  --      .Blood Blood-Peripheral  10-05 @ 05:25   No growth at 4 days  --  --          MEDICATIONS:    MEDICATIONS  (STANDING):  albuterol/ipratropium for Nebulization 3 milliLiter(s) Nebulizer every 6 hours  aspirin  chewable 81 milliGRAM(s) Oral daily  chlorhexidine 2% Cloths 1 Application(s) Topical <User Schedule>  heparin   Injectable 5000 Unit(s) SubCutaneous every 8 hours  insulin glargine Injectable (LANTUS) 30 Unit(s) SubCutaneous at bedtime  insulin lispro (ADMELOG) corrective regimen sliding scale   SubCutaneous every 6 hours  insulin lispro Injectable (ADMELOG) 15 Unit(s) SubCutaneous every 6 hours  meropenem  IVPB 1000 milliGRAM(s) IV Intermittent every 8 hours  meropenem  IVPB      methylPREDNISolone sodium succinate Injectable 40 milliGRAM(s) IV Push daily  nicotine -   7 mG/24Hr(s) Patch 1 Patch Transdermal daily  pantoprazole  Injectable 40 milliGRAM(s) IV Push daily  tamsulosin 0.4 milliGRAM(s) Oral at bedtime      MEDICATIONS  (PRN):      REVIEW OF SYSTEMS:                           ALL ROS DONE [ X   ]    CONSTITUTIONAL:  LETHARGIC [   ], FEVER [   ], UNRESPONSIVE [   ]  CVS:  CP  [   ], SOB, [   ], PALPITATIONS [   ], DIZZYNESS [   ]  RS: COUGH [   ], SPUTUM [   ]  GI: ABDOMINAL PAIN [   ], NAUSEA [   ], VOMITINGS [   ], DIARRHEA [   ], CONSTIPATION [   ]  :  DYSURIA [   ], NOCTURIA [   ], INCREASED FREQUENCY [   ], DRIBLING [   ],  SKELETAL: PAINFUL JOINTS [   ], SWOLLEN JOINTS [   ], NECK ACHE [   ], LOW BACK ACHE [   ],  SKIN : ULCERS [   ], RASH [   ], ITCHING [   ]  CNS: HEAD ACHE [   ], DOUBLE VISION [   ], BLURRED VISION [   ], AMS / CONFUSION [   ], SEIZURES [   ], WEAKNESS [   ],TINGLING / NUMBNESS [   ]      PHYSICAL EXAMINATION:  GENERAL APPEARANCE: NO DISTRESS  HEENT:  NO PALLOR, NO  JVD,  NO   NODES, NECK SUPPLE  CVS: S1 +, S2 +,   RS: AEEB,  OCCASIONAL  RALES +,   RONCHI  ABD: SOFT, NT, NO, BS +  EXT: NO PE  SKIN: WARM,   SKELETAL:  ROM ACCEPTABLE  CNS:  AAO X 0    RADIOLOGY :    RADIOLOGY AND READINGS REVIEWED    ASSESSMENT :       PLAN:  HPI:  Patient is a 62 yo male from [facility], active smoker, with a PMHx of CAD, CHF on nocturnal BIPAP, COPD ( MULTIPLE intubations in past), peripheral neuropathy, GERD, HTN, HLD, obesity, polyarthritis, Pulmonary HTN, TIA, Vit D Def, presents with hypoxemia from nursing facility and lethargy. Patient unable to provide medical history. As per ED, documentation patient had an abrupt onset shortness of breath, generalized weakness, elevated heart rate, hypoxia to the 70s.     Upon chart review, patient was admitted to [facility] ICU in March 2022 due to acute on chronic hypoxic hypercapnic respiratory failure. Patient was eventually weaned off ventilator and then discharged home.   (05 Oct 2023 08:23)    # ACUTE ON CHRONIC HYPOXIC HYPERCAPNEIC RESPIRATORY FAILURE , ? ASPIRATION PNA  # HX OF COPD  # CHRONIC CHF    - ON MEROPENEM, F/U BCX  - CTA CHEST - ? RIGHT MIDDLE AND LOWER LOBE INFILTRATE  - ON DUONEB, SOLUMEDROL  - [ON SPIRIVA, SYMBICORT, ALBUTEROL - AT FACILITY]  - F/U ECHO  - CRITICAL CARE MANAGEMENT IN PROGRESS  - ID CONSULT    - s/p mechanical ventilation    - PRECEDEX DISCONTINUED DUE TO HYPERPYREXIA    # SEPSIS S/T UTI + PNA  - ON MEROPENEM, F/U UCX AND BCX  - ID CONSULT    # ACUTE METABOLIC ENCEPHALOPATHY  - NOTED CT HEAD    # HYPERKALEMIA - IMPROVED  - S/P INSULIN + D50    # DM - LANTUS, SSI  + FS  # HTN  # HLD  # HX OF CAD  # GERD  # GI AND DVT PPX

## 2023-10-09 NOTE — CONSULT NOTE ADULT - SUBJECTIVE AND OBJECTIVE BOX
Time of visit:    CHIEF COMPLAINT: Patient is a 63y old  Male who presents with a chief complaint of Acute on chronic hypoxemic hypercapnic respiratory failure (09 Oct 2023 07:29)      HPI:  Patient is a 64 yo male from [facility], active smoker, with a PMHx of CAD, CHF on nocturnal BIPAP, COPD ( MULTIPLE intubations in past), peripheral neuropathy, GERD, HTN, HLD, obesity, polyarthritis, Pulmonary HTN, TIA, Vit D Def, presents with hypoxemia from nursing facility and lethargy. Patient unable to provide medical history. As per ED, documentation patient had an abrupt onset shortness of breath, generalized weakness, elevated heart rate, hypoxia to the 70s.     Upon chart review, patient was admitted to [facility] ICU in March 2022 due to acute on chronic hypoxic hypercapnic respiratory failure. Patient was eventually weaned off ventilator and then discharged home.   (05 Oct 2023 08:23)   Patient seen and examined.     PAST MEDICAL & SURGICAL HISTORY:  COPD (chronic obstructive pulmonary disease)  Oxygen 2-3L at home      Stented coronary artery  2017      HLD (hyperlipidemia)      Pulmonary HTN      Type 2 diabetes mellitus without complication, with long-term current use of insulin      DM (diabetes mellitus)      CAD (coronary artery disease)      GERD (gastroesophageal reflux disease)      Insomnia      CHF (congestive heart failure)      HTN (hypertension)      Mood disorder      No significant past surgical history          Allergies    No Known Allergies    Intolerances        MEDICATIONS  (STANDING):  albuterol/ipratropium for Nebulization 3 milliLiter(s) Nebulizer every 6 hours  aspirin  chewable 81 milliGRAM(s) Oral daily  chlorhexidine 2% Cloths 1 Application(s) Topical <User Schedule>  heparin   Injectable 5000 Unit(s) SubCutaneous every 8 hours  insulin glargine Injectable (LANTUS) 25 Unit(s) SubCutaneous at bedtime  insulin lispro (ADMELOG) corrective regimen sliding scale   SubCutaneous every 6 hours  insulin lispro Injectable (ADMELOG) 10 Unit(s) SubCutaneous every 6 hours  meropenem  IVPB 1000 milliGRAM(s) IV Intermittent every 8 hours  meropenem  IVPB      methylPREDNISolone sodium succinate Injectable 40 milliGRAM(s) IV Push daily  nicotine -   7 mG/24Hr(s) Patch 1 Patch Transdermal daily  pantoprazole  Injectable 40 milliGRAM(s) IV Push daily  tamsulosin 0.4 milliGRAM(s) Oral at bedtime      MEDICATIONS  (PRN):   Medications up to date at time of exam.    Medications up to date at time of exam.    FAMILY HISTORY:  No family history of hypertension    No family history of mental disorder    FH: myocardial infarction (Mother)        SOCIAL HISTORY  Smoking History: [  x ] smoking/smoke exposure, active smoker   Living Condition: [   ] apartment, [   ] private house  Work History:   Travel History: denies recent travel  Illicit Substance Use: denies  Alcohol Use: denies    REVIEW OF SYSTEMS: as per EMR     CONSTITUTIONAL:  denies fevers, chills, sweats, weight loss    HEENT:  denies diplopia or blurred vision, sore throat or runny nose.    CARDIOVASCULAR:  denies pressure, squeezing, tightness, or heaviness about the chest; no palpitations.    RESPIRATORY:  denies SOB, cough, GIORDANO, wheezing.    GASTROINTESTINAL:  denies abdominal pain, nausea, vomiting or diarrhea.    GENITOURINARY: denies dysuria, frequency or urgency.    NEUROLOGIC:  denies numbness, tingling, seizures or weakness.    PSYCHIATRIC:  denies disorder of thought or mood.    MSK: denies swelling, redness      PHYSICAL EXAMINATION:    GENERAL: The patient is a well-developed, well-nourished, in no apparent distress.  + CPAP    Vital Signs Last 24 Hrs  T(C): 37.7 (09 Oct 2023 13:00), Max: 38.5 (09 Oct 2023 01:00)  T(F): 99.9 (09 Oct 2023 13:00), Max: 101.3 (09 Oct 2023 01:00)  HR: 76 (09 Oct 2023 13:00) (61 - 102)  BP: 142/83 (09 Oct 2023 13:00) (101/69 - 147/86)  BP(mean): 98 (09 Oct 2023 13:00) (74 - 103)  RR: 22 (09 Oct 2023 13:00) (0 - 26)  SpO2: 92% (09 Oct 2023 13:00) (88% - 98%)    Parameters below as of 08 Oct 2023 18:00  Patient On (Oxygen Delivery Method): ventilator      Mode: CPAP with PS  FiO2: 40  PEEP: 5  PS: 8  ITime: 1  MAP: 9  PIP: 19   (if applicable)    Chest Tube (if applicable)    HEENT: Head is normocephalic and atraumatic. Extraocular muscles are intact. Mucous membranes are moist.  + CPAP  + NGT     NECK: Supple, no palpable adenopathy.    LUNGS: Clear to auscultation, no wheezing, rales, or rhonchi.    HEART: Regular rate and rhythm without murmur.    ABDOMEN: Soft, nontender, and nondistended.  No hepatosplenomegaly is noted.    RENAL: No difficulty voiding, no pelvic pain    EXTREMITIES: Without any cyanosis, clubbing, rash, lesions or edema.    NEUROLOGIC: Awake, alert, oriented, grossly intact    SKIN: Warm, dry, good turgor.      LABS:                        14.0   15.28 )-----------( 177      ( 09 Oct 2023 04:22 )             46.2     10-09    144  |  106  |  36<H>  ----------------------------<  219<H>  4.3   |  36<H>  |  0.69    Ca    8.2<L>      09 Oct 2023 04:22  Phos  2.8     10-09  Mg     2.8     10-09    TPro  6.5  /  Alb  2.6<L>  /  TBili  0.3  /  DBili  x   /  AST  48<H>  /  ALT  111<H>  /  AlkPhos  76  10-09      Urinalysis Basic - ( 09 Oct 2023 04:22 )    Color: x / Appearance: x / SG: x / pH: x  Gluc: 219 mg/dL / Ketone: x  / Bili: x / Urobili: x   Blood: x / Protein: x / Nitrite: x   Leuk Esterase: x / RBC: x / WBC x   Sq Epi: x / Non Sq Epi: x / Bacteria: x      ABG - ( 08 Oct 2023 03:36 )  pH, Arterial: 7.45  pH, Blood: x     /  pCO2: 49    /  pO2: 87    / HCO3: 34    / Base Excess: 8.6   /  SaO2: 98            MICROBIOLOGY: (if applicable)    RADIOLOGY & ADDITIONAL STUDIES:  EKG:   CXR:< from: Xray Chest 1 View- PORTABLE-Urgent (Xray Chest 1 View- PORTABLE-Urgent .) (10.06.23 @ 17:41) >    ACC: [id number] EXAM:  XR CHEST PORTABLE URGENT 1V   ORDERED BY: GREGORIO BRISCOE     PROCEDURE DATE:  10/06/2023          INTERPRETATION:  TIME OF EXAM: October 6, 2023 at 4:32 PM and 4:33 PM.    CLINICAL INFORMATION: NG tube placement.    COMPARISON: October 6, 2023 at 10:37 AM.    TECHNIQUE:   AP Portable chest x-rays. Limited by rotation.    INTERPRETATION:    The heart is not enlarged. The mediastinum is not accurately evaluated on   this image.  Bilateral hilar enlargement is again seen consistent with enlarged   pulmonary arteries and right hilar lymphadenopathy seen on CT of the   chest from October 5, 2023.  ET tube tip is approximately 6.7 cm above the angela.  Enteric tube tip is just beyond the GE junction and into the stomach.  There are patchy right lower lung opacities.  The included left lung is clear.  No pleural effusion or pneumothorax is seen.  No acute bony abnormality is seen.      IMPRESSION:  Bilateral hilar enlargement again seen, consistent with   enlarged pulmonary arteries, which can be seen with pulmonary artery   hypertension, and right hilar lymphadenopathy, noted on the CT scan of   the chest from October 5, 2023.    Enteric tube tip is just beyond the GE junction and into the stomach.   Recommend advancing the enteric tube. Discussed with Dr. Moore with read   back at 2:02 PM on October 7, 2023 by Dr. Sosa.    Continued patchy right lower lung opacities.    --- End of Report ---            RED SOSA MD; Attending Radiologist  This document has been electronically signed. Oct  7 2023  2:04PM    < end of copied text >    ECHO:    IMPRESSION: 63y Male PAST MEDICAL & SURGICAL HISTORY:  COPD (chronic obstructive pulmonary disease)  Oxygen 2-3L at home      Stented coronary artery  2017      HLD (hyperlipidemia)      Pulmonary HTN      Type 2 diabetes mellitus without complication, with long-term current use of insulin      DM (diabetes mellitus)      CAD (coronary artery disease)      GERD (gastroesophageal reflux disease)      Insomnia      CHF (congestive heart failure)      HTN (hypertension)      Mood disorder      No significant past surgical history       p/w       IMP: IMP: This is a  64 yo man  from [facility], active smoker, with  CAD, CHF on nocturnal BIPAP, COPD ( MULTIPLE intubations in past), peripheral neuropathy, GERD, HTN, HLD, obesity, polyarthritis, Pulmonary HTN, TIA, Vit D Def, presents with hypoxemia from nursing facility and lethargy and altered mental status. Upon evaluation in ED, patient afebrile, bp noted to be 124/77, tachycardic to 124bpm, and noted to be hypoxic on subsequently intubated. Physical examination noted for course lung examination, mark catheter placed and immediately draining 1.5L of pyuria. Labs significant for ABG 7.19/105/230/40,  potassium noted to be 6.2,  utox positive for benzo, wbc noted to be 22 and lactate normal. UA showing findings concerning for UTI, blood cultures pending. CT head, CTA chest and abdomen pending. Patient admitted to ICU for Acute on chronic hypoxic hypercapnic respiratory failure requiring emergent intubation extubated and placed on BiPaP           ASSESSMENT     - Acute encephalopathy   - Acute on chronic hypoxic hypercapnic respiratory failure  - Sepsis 2/2 UTI  - Hyperkalemia   - COPD ex   - DM uncontrol   - Chronic CHF   - Hx of CAD  - HTN HLD    Plan     - Hold sedation   - Continue BiPaP as needed   - Pulmonary hygiene   - Continue antibx   - Duonebs   - Nicotine patch   - S/S eval   - OOB to chair   - PT eval   - DVT GI prophy   - No precedex due to hyperpyrexia     case discussed with icu team     time spent 37 min

## 2023-10-10 LAB
ALBUMIN SERPL ELPH-MCNC: 2.8 G/DL — LOW (ref 3.5–5)
ALP SERPL-CCNC: 68 U/L — SIGNIFICANT CHANGE UP (ref 40–120)
ALT FLD-CCNC: 81 U/L DA — HIGH (ref 10–60)
ANION GAP SERPL CALC-SCNC: 3 MMOL/L — LOW (ref 5–17)
ANION GAP SERPL CALC-SCNC: 6 MMOL/L — SIGNIFICANT CHANGE UP (ref 5–17)
AST SERPL-CCNC: 22 U/L — SIGNIFICANT CHANGE UP (ref 10–40)
BILIRUB SERPL-MCNC: 0.6 MG/DL — SIGNIFICANT CHANGE UP (ref 0.2–1.2)
BUN SERPL-MCNC: 24 MG/DL — HIGH (ref 7–18)
BUN SERPL-MCNC: 28 MG/DL — HIGH (ref 7–18)
CALCIUM SERPL-MCNC: 7.8 MG/DL — LOW (ref 8.4–10.5)
CALCIUM SERPL-MCNC: 8.4 MG/DL — SIGNIFICANT CHANGE UP (ref 8.4–10.5)
CHLORIDE SERPL-SCNC: 102 MMOL/L — SIGNIFICANT CHANGE UP (ref 96–108)
CHLORIDE SERPL-SCNC: 105 MMOL/L — SIGNIFICANT CHANGE UP (ref 96–108)
CO2 SERPL-SCNC: 31 MMOL/L — SIGNIFICANT CHANGE UP (ref 22–31)
CO2 SERPL-SCNC: 36 MMOL/L — HIGH (ref 22–31)
CREAT SERPL-MCNC: 0.65 MG/DL — SIGNIFICANT CHANGE UP (ref 0.5–1.3)
CREAT SERPL-MCNC: 0.68 MG/DL — SIGNIFICANT CHANGE UP (ref 0.5–1.3)
CULTURE RESULTS: SIGNIFICANT CHANGE UP
EGFR: 106 ML/MIN/1.73M2 — SIGNIFICANT CHANGE UP
EGFR: 107 ML/MIN/1.73M2 — SIGNIFICANT CHANGE UP
GLUCOSE BLDC GLUCOMTR-MCNC: 254 MG/DL — HIGH (ref 70–99)
GLUCOSE BLDC GLUCOMTR-MCNC: 274 MG/DL — HIGH (ref 70–99)
GLUCOSE BLDC GLUCOMTR-MCNC: 303 MG/DL — HIGH (ref 70–99)
GLUCOSE SERPL-MCNC: 237 MG/DL — HIGH (ref 70–99)
GLUCOSE SERPL-MCNC: 314 MG/DL — HIGH (ref 70–99)
HCT VFR BLD CALC: 44.1 % — SIGNIFICANT CHANGE UP (ref 39–50)
HGB BLD-MCNC: 13.1 G/DL — SIGNIFICANT CHANGE UP (ref 13–17)
MAGNESIUM SERPL-MCNC: 2.4 MG/DL — SIGNIFICANT CHANGE UP (ref 1.6–2.6)
MAGNESIUM SERPL-MCNC: 2.7 MG/DL — HIGH (ref 1.6–2.6)
MCHC RBC-ENTMCNC: 27.3 PG — SIGNIFICANT CHANGE UP (ref 27–34)
MCHC RBC-ENTMCNC: 29.7 GM/DL — LOW (ref 32–36)
MCV RBC AUTO: 91.9 FL — SIGNIFICANT CHANGE UP (ref 80–100)
MRSA PCR RESULT.: SIGNIFICANT CHANGE UP
NRBC # BLD: 0 /100 WBCS — SIGNIFICANT CHANGE UP (ref 0–0)
PHOSPHATE SERPL-MCNC: 1.7 MG/DL — LOW (ref 2.5–4.5)
PHOSPHATE SERPL-MCNC: 5.3 MG/DL — HIGH (ref 2.5–4.5)
PLATELET # BLD AUTO: 175 K/UL — SIGNIFICANT CHANGE UP (ref 150–400)
POTASSIUM SERPL-MCNC: 4.3 MMOL/L — SIGNIFICANT CHANGE UP (ref 3.5–5.3)
POTASSIUM SERPL-MCNC: 4.9 MMOL/L — SIGNIFICANT CHANGE UP (ref 3.5–5.3)
POTASSIUM SERPL-SCNC: 4.3 MMOL/L — SIGNIFICANT CHANGE UP (ref 3.5–5.3)
POTASSIUM SERPL-SCNC: 4.9 MMOL/L — SIGNIFICANT CHANGE UP (ref 3.5–5.3)
PROT SERPL-MCNC: 6.3 G/DL — SIGNIFICANT CHANGE UP (ref 6–8.3)
RBC # BLD: 4.8 M/UL — SIGNIFICANT CHANGE UP (ref 4.2–5.8)
RBC # FLD: 12.6 % — SIGNIFICANT CHANGE UP (ref 10.3–14.5)
S AUREUS DNA NOSE QL NAA+PROBE: SIGNIFICANT CHANGE UP
SODIUM SERPL-SCNC: 139 MMOL/L — SIGNIFICANT CHANGE UP (ref 135–145)
SODIUM SERPL-SCNC: 144 MMOL/L — SIGNIFICANT CHANGE UP (ref 135–145)
SPECIMEN SOURCE: SIGNIFICANT CHANGE UP
WBC # BLD: 11.43 K/UL — HIGH (ref 3.8–10.5)
WBC # FLD AUTO: 11.43 K/UL — HIGH (ref 3.8–10.5)

## 2023-10-10 RX ORDER — ALBUTEROL 90 UG/1
2 AEROSOL, METERED ORAL EVERY 6 HOURS
Refills: 0 | Status: DISCONTINUED | OUTPATIENT
Start: 2023-10-10 | End: 2023-10-13

## 2023-10-10 RX ORDER — OLANZAPINE 15 MG/1
5 TABLET, FILM COATED ORAL ONCE
Refills: 0 | Status: COMPLETED | OUTPATIENT
Start: 2023-10-10 | End: 2023-10-10

## 2023-10-10 RX ORDER — BUDESONIDE AND FORMOTEROL FUMARATE DIHYDRATE 160; 4.5 UG/1; UG/1
2 AEROSOL RESPIRATORY (INHALATION)
Refills: 0 | Status: DISCONTINUED | OUTPATIENT
Start: 2023-10-10 | End: 2023-10-13

## 2023-10-10 RX ORDER — MONTELUKAST 4 MG/1
5 TABLET, CHEWABLE ORAL AT BEDTIME
Refills: 0 | Status: DISCONTINUED | OUTPATIENT
Start: 2023-10-10 | End: 2023-10-13

## 2023-10-10 RX ORDER — POTASSIUM PHOSPHATE, MONOBASIC POTASSIUM PHOSPHATE, DIBASIC 236; 224 MG/ML; MG/ML
30 INJECTION, SOLUTION INTRAVENOUS ONCE
Refills: 0 | Status: COMPLETED | OUTPATIENT
Start: 2023-10-10 | End: 2023-10-10

## 2023-10-10 RX ORDER — OLANZAPINE 15 MG/1
5 TABLET, FILM COATED ORAL AT BEDTIME
Refills: 0 | Status: DISCONTINUED | OUTPATIENT
Start: 2023-10-11 | End: 2023-10-13

## 2023-10-10 RX ADMIN — Medication 8: at 15:39

## 2023-10-10 RX ADMIN — Medication 6: at 05:59

## 2023-10-10 RX ADMIN — Medication 1 PATCH: at 20:14

## 2023-10-10 RX ADMIN — Medication 15 UNIT(S): at 11:01

## 2023-10-10 RX ADMIN — PANTOPRAZOLE SODIUM 40 MILLIGRAM(S): 20 TABLET, DELAYED RELEASE ORAL at 11:02

## 2023-10-10 RX ADMIN — Medication 81 MILLIGRAM(S): at 11:02

## 2023-10-10 RX ADMIN — OLANZAPINE 5 MILLIGRAM(S): 15 TABLET, FILM COATED ORAL at 13:43

## 2023-10-10 RX ADMIN — HEPARIN SODIUM 5000 UNIT(S): 5000 INJECTION INTRAVENOUS; SUBCUTANEOUS at 05:58

## 2023-10-10 RX ADMIN — POTASSIUM PHOSPHATE, MONOBASIC POTASSIUM PHOSPHATE, DIBASIC 83.33 MILLIMOLE(S): 236; 224 INJECTION, SOLUTION INTRAVENOUS at 06:11

## 2023-10-10 RX ADMIN — MEROPENEM 100 MILLIGRAM(S): 1 INJECTION INTRAVENOUS at 15:24

## 2023-10-10 RX ADMIN — Medication 3 MILLILITER(S): at 02:54

## 2023-10-10 RX ADMIN — Medication 1 PATCH: at 07:48

## 2023-10-10 RX ADMIN — Medication 40 MILLIGRAM(S): at 05:53

## 2023-10-10 RX ADMIN — Medication 1 PATCH: at 11:02

## 2023-10-10 RX ADMIN — CHLORHEXIDINE GLUCONATE 1 APPLICATION(S): 213 SOLUTION TOPICAL at 06:10

## 2023-10-10 RX ADMIN — Medication 1 PATCH: at 12:41

## 2023-10-10 RX ADMIN — BUDESONIDE AND FORMOTEROL FUMARATE DIHYDRATE 2 PUFF(S): 160; 4.5 AEROSOL RESPIRATORY (INHALATION) at 10:41

## 2023-10-10 RX ADMIN — Medication 15 UNIT(S): at 15:39

## 2023-10-10 RX ADMIN — HEPARIN SODIUM 5000 UNIT(S): 5000 INJECTION INTRAVENOUS; SUBCUTANEOUS at 13:44

## 2023-10-10 RX ADMIN — Medication 3 MILLILITER(S): at 08:56

## 2023-10-10 RX ADMIN — MEROPENEM 100 MILLIGRAM(S): 1 INJECTION INTRAVENOUS at 05:51

## 2023-10-10 RX ADMIN — Medication 6: at 11:00

## 2023-10-10 NOTE — SWALLOW BEDSIDE ASSESSMENT ADULT - SWALLOW EVAL: DIAGNOSIS
Pt p/w s&s oropharyngeal dysphagia c/b prolonged mastication (2/2 total edentition), slow A-P transport, mild oral stasis; delayed swallow trigger, decreased hyolaryngeal elevation, no overt s/s of laryngeal penetration or aspiration at this exam. Clear vocal quality immediately s/p PO intake, but notable delayed cough (>1 min) after, symptoms suggestive of GERD.

## 2023-10-10 NOTE — SWALLOW BEDSIDE ASSESSMENT ADULT - COMMENTS
Pt AA+Ox2, confused/delirious, fluent but intermittent nonsequitur responses. HOB elevated to 45°. Breakfast tray present.

## 2023-10-10 NOTE — PROGRESS NOTE ADULT - SUBJECTIVE AND OBJECTIVE BOX
INTERVAL HPI/OVERNIGHT EVENTS: No acute overnight events. Patient extubated 10/9. Seen patient at bedside. Pt. is [place] but ANOx3. He has no new complaints and feeling well.       PRESSORS: [ ] YES [ ] NO  WHICH:    ANTIBIOTICS:                  DATE STARTED:  ANTIBIOTICS:                  DATE STARTED:    Antimicrobial:  meropenem  IVPB 1000 milliGRAM(s) IV Intermittent every 8 hours  meropenem  IVPB        Cardiovascular:    Pulmonary:  albuterol    90 MICROgram(s) HFA Inhaler 2 Puff(s) Inhalation every 6 hours PRN  budesonide 160 MICROgram(s)/formoterol 4.5 MICROgram(s) Inhaler 2 Puff(s) Inhalation two times a day  montelukast  Chewable 5 milliGRAM(s) Oral at bedtime    Hematalogic:  aspirin  chewable 81 milliGRAM(s) Oral daily  heparin   Injectable 5000 Unit(s) SubCutaneous every 8 hours    Other:  chlorhexidine 2% Cloths 1 Application(s) Topical <User Schedule>  insulin glargine Injectable (LANTUS) 30 Unit(s) SubCutaneous at bedtime  insulin lispro (ADMELOG) corrective regimen sliding scale   SubCutaneous every 6 hours  insulin lispro Injectable (ADMELOG) 15 Unit(s) SubCutaneous every 6 hours  methylPREDNISolone sodium succinate Injectable 40 milliGRAM(s) IV Push daily  nicotine -   7 mG/24Hr(s) Patch 1 Patch Transdermal daily  pantoprazole  Injectable 40 milliGRAM(s) IV Push daily  tamsulosin 0.4 milliGRAM(s) Oral at bedtime    albuterol    90 MICROgram(s) HFA Inhaler 2 Puff(s) Inhalation every 6 hours PRN  aspirin  chewable 81 milliGRAM(s) Oral daily  budesonide 160 MICROgram(s)/formoterol 4.5 MICROgram(s) Inhaler 2 Puff(s) Inhalation two times a day  chlorhexidine 2% Cloths 1 Application(s) Topical <User Schedule>  heparin   Injectable 5000 Unit(s) SubCutaneous every 8 hours  insulin glargine Injectable (LANTUS) 30 Unit(s) SubCutaneous at bedtime  insulin lispro (ADMELOG) corrective regimen sliding scale   SubCutaneous every 6 hours  insulin lispro Injectable (ADMELOG) 15 Unit(s) SubCutaneous every 6 hours  meropenem  IVPB 1000 milliGRAM(s) IV Intermittent every 8 hours  meropenem  IVPB      methylPREDNISolone sodium succinate Injectable 40 milliGRAM(s) IV Push daily  montelukast  Chewable 5 milliGRAM(s) Oral at bedtime  nicotine -   7 mG/24Hr(s) Patch 1 Patch Transdermal daily  pantoprazole  Injectable 40 milliGRAM(s) IV Push daily  tamsulosin 0.4 milliGRAM(s) Oral at bedtime    Drug Dosing Weight  Height (cm): 185.4 (06 Oct 2023 18:01)  Weight (kg): 93.5 (05 Oct 2023 10:30)  BMI (kg/m2): 27.2 (06 Oct 2023 18:01)  BSA (m2): 2.18 (06 Oct 2023 18:01)    PHYSICAL EXAM:  GENERAL: NAD  EYES: EOMI, PERRLA  NECK: Supple, No JVD; Normal thyroid; Trachea midline: No LAD   NERVOUS SYSTEM:  Alert & Oriented X3,    CHEST/LUNG: No rales, rhonchi, wheezing, breath sounds present bilaterally  HEART: Regular rate and rhythm; No murmurs, no gallops  ABDOMEN: Soft, Nontender, Nondistended; Bowel sounds present, no pain or masses on palpation  : Amor removed   EXTREMITIES:  2+ Peripheral Pulses, No clubbing, cyanosis, or edema  SKIN: warm, intact, no lesions     LINES/DRAINS/DEVICES  CENTRAL LINE: [ ] YES [x] NO  LOCATION:     AMOR: [ ] YES [x ] NO     A-LINE:  [ ] YES [ x] NO  LOCATION:       ICU Vital Signs Last 24 Hrs  T(C): 36.2 (10 Oct 2023 12:00), Max: 38.1 (09 Oct 2023 22:00)  T(F): 97.2 (10 Oct 2023 12:00), Max: 100.6 (09 Oct 2023 22:00)  HR: 83 (10 Oct 2023 13:00) (65 - 98)  BP: 149/73 (10 Oct 2023 11:00) (121/72 - 150/82)  BP(mean): 95 (10 Oct 2023 11:00) (71 - 113)  ABP: --  ABP(mean): --  RR: 20 (10 Oct 2023 13:00) (12 - 27)  SpO2: 86% (10 Oct 2023 13:00) (85% - 96%)    O2 Parameters below as of 09 Oct 2023 15:05      O2 Concentration (%): 40              10-09 @ 07:01  -  10-10 @ 07:00  --------------------------------------------------------  IN: 96.8 mL / OUT: 1325 mL / NET: -1228.2 mL              LABS:  CBC Full  -  ( 10 Oct 2023 03:37 )  WBC Count : 11.43 K/uL  RBC Count : 4.80 M/uL  Hemoglobin : 13.1 g/dL  Hematocrit : 44.1 %  Platelet Count - Automated : 175 K/uL  Mean Cell Volume : 91.9 fl  Mean Cell Hemoglobin : 27.3 pg  Mean Cell Hemoglobin Concentration : 29.7 gm/dL  Auto Neutrophil # : x  Auto Lymphocyte # : x  Auto Monocyte # : x  Auto Eosinophil # : x  Auto Basophil # : x  Auto Neutrophil % : x  Auto Lymphocyte % : x  Auto Monocyte % : x  Auto Eosinophil % : x  Auto Basophil % : x    10-10    139  |  102  |  24<H>  ----------------------------<  314<H>  4.9   |  31  |  0.65    Ca    7.8<L>      10 Oct 2023 11:10  Phos  5.3     10-10  Mg     2.4     10-10    TPro  6.3  /  Alb  2.8<L>  /  TBili  0.6  /  DBili  x   /  AST  22  /  ALT  81<H>  /  AlkPhos  68  10-10      Urinalysis Basic - ( 10 Oct 2023 11:10 )    Color: x / Appearance: x / SG: x / pH: x  Gluc: 314 mg/dL / Ketone: x  / Bili: x / Urobili: x   Blood: x / Protein: x / Nitrite: x   Leuk Esterase: x / RBC: x / WBC x   Sq Epi: x / Non Sq Epi: x / Bacteria: x          RADIOLOGY & ADDITIONAL STUDIES REVIEWED DURING TEAM ROUNDS    [ ]GOALS OF CARE DISCUSSION WITH PATIENT/FAMILY/PROXY:    CRITICAL CARE TIME SPENT: 35 minutes

## 2023-10-10 NOTE — SWALLOW BEDSIDE ASSESSMENT ADULT - PHARYNGEAL PHASE
Delayed pharyngeal swallow/Decreased laryngeal elevation immediate clear vocal quality; cough delay = >1 min/Delayed cough post oral intake

## 2023-10-10 NOTE — PROGRESS NOTE ADULT - ATTENDING COMMENTS
64 yo man  from [facility], active smoker, with  CAD, CHF on nocturnal BIPAP, COPD ( MULTIPLE intubations in past), peripheral neuropathy, GERD, HTN, HLD, obesity, polyarthritis, Pulmonary HTN, TIA, Vit D Def, presents with hypoxemia from nursing facility and lethargy and altered mental status. Upon evaluation in ED, patient afebrile, bp noted to be 124/77, tachycardic to 124bpm, and noted to be hypoxic on subsequently intubated. Physical examination noted for course lung examination, mark catheter placed and immediately draining 1.5L of pyuria. Labs significant for ABG 7.19/105/230/40,  potassium noted to be 6.2,  utox positive for benzo, wbc noted to be 22 and lactate normal. UA showing findings concerning for UTI, blood cultures pending. CT head, CTA chest and abdomen pending. Patient admitted to ICU for Acute on chronic hypoxic hypercapnic respiratory failure requiring emergent intubation.    ASSESSMENT   - Acute encephalopathy   - Acute on chronic hypoxic hypercapnic respiratory failure  - Sepsis 2/2 UTI  - Hyperkalemia   - COPD exacerbation  - DM uncontrol   - Chronic CHF   - Hx of CAD  - HTN HLD  -GERD         Plan   - Extubated 10/9   - Oxygen support to maintain saturation > 88%  - D/c steroids   - Duonebs as needed  - Restart Symbicort   - Hemodynamic monitoring   - Monitor cardiac enzymes   - 2 DECHO   - Cont. antibiotics for now  - Dysphagia screening and start oral diet as tolerated   - Void trial  - Nicotine patch   - Monitor blood glucose with coverage   - DVT GI prophy   - PT evaluation   - OOB to chair  - Transfer to SCU service
62 yo man  from [facility], active smoker, with  CAD, CHF on nocturnal BIPAP, COPD ( MULTIPLE intubations in past), peripheral neuropathy, GERD, HTN, HLD, obesity, polyarthritis, Pulmonary HTN, TIA, Vit D Def, presents with hypoxemia from nursing facility and lethargy and altered mental status. Upon evaluation in ED, patient afebrile, bp noted to be 124/77, tachycardic to 124bpm, and noted to be hypoxic on subsequently intubated. Physical examination noted for course lung examination, mark catheter placed and immediately draining 1.5L of pyuria. Labs significant for ABG 7.19/105/230/40,  potassium noted to be 6.2,  utox positive for benzo, wbc noted to be 22 and lactate normal. UA showing findings concerning for UTI, blood cultures pending. CT head, CTA chest and abdomen pending. Patient admitted to ICU for Acute on chronic hypoxic hypercapnic respiratory failure requiring emergent intubation.    ASSESSMENT   - Acute encephalopathy   - Acute on chronic hypoxic hypercapnic respiratory failure  - Sepsis 2/2 UTI  - Hyperkalemia   - COPD exacerbation  - DM uncontrol   - Chronic CHF   - Hx of CAD  - HTN HLD  -GERD         Plan   - Awake this morning and following commands  - tolerated PSV this morning   - Extubated to bipap  - Oxygen support to maintain saturation > 88%  - Taper steroids   - Duonebs   - Hemodynamic monitoring   - Monitor cardiac enzymes   - 2 DECHO   - IV antibx to cover enterococcus ( hx of enterococcus UTI)   - given fevers, will repeat cultures   - Cont. antibiotics for now  - NGT feed when off bipap  - Mark cath   - Nicotine patch   - Monitor blood glucose with coverage   - DVT GI prophy   - Cont. ICU Care
IMP: This is a  62 yo man  from [facility], active smoker, with  CAD, CHF on nocturnal BIPAP, COPD ( MULTIPLE intubations in past), peripheral neuropathy, GERD, HTN, HLD, obesity, polyarthritis, Pulmonary HTN, TIA, Vit D Def, presents with hypoxemia from nursing facility and lethargy and altered mental status. Upon evaluation in ED, patient afebrile, bp noted to be 124/77, tachycardic to 124bpm, and noted to be hypoxic on subsequently intubated. Physical examination noted for course lung examination, mark catheter placed and immediately draining 1.5L of pyuria. Labs significant for ABG 7.19/105/230/40,  potassium noted to be 6.2,  utox positive for benzo, wbc noted to be 22 and lactate normal. UA showing findings concerning for UTI, blood cultures pending. CT head, CTA chest and abdomen pending. Patient admitted to ICU for Acute on chronic hypoxic hypercapnic respiratory failure requiring emergent intubation.           ASSESSMENT     - Acute encephalopathy   - Acute on chronic hypoxic hypercapnic respiratory failure  - Sepsis 2/2 UTI  - Hyperkalemia   - COPD ex   - DM uncontrol   - Chronic CHF   - Hx of CAD  - HTN HLD  -GERD         Plan     - Sedation and pain control for comfort and vent synchrony   - Continue vent support , adjust as per ABG   -  SAT SBT today but PS 10 , failed due to tachycardia and tachypnea    - More aggressive weaning tomorr for possible extubation   - Solumedrol  - Duonebs   - Hemodynamic monitoring   - Monitor cardiac enzymes   - 2 DECHO   - IV antibx to cover enterococcus ( hx of enterococcus UTI)   - Cultures neg   - NGT feed   - Mark cath   - Nicotine patch   - Monitor blood glucose with coverage   - DVT GI prophy .
IMP: This is a  64 yo man  from [facility], active smoker, with  CAD, CHF on nocturnal BIPAP, COPD ( MULTIPLE intubations in past), peripheral neuropathy, GERD, HTN, HLD, obesity, polyarthritis, Pulmonary HTN, TIA, Vit D Def, presents with hypoxemia from nursing facility and lethargy and altered mental status. Upon evaluation in ED, patient afebrile, bp noted to be 124/77, tachycardic to 124bpm, and noted to be hypoxic on subsequently intubated. Physical examination noted for course lung examination, mark catheter placed and immediately draining 1.5L of pyuria. Labs significant for ABG 7.19/105/230/40,  potassium noted to be 6.2,  utox positive for benzo, wbc noted to be 22 and lactate normal. UA showing findings concerning for UTI, blood cultures pending. CT head, CTA chest and abdomen pending. Patient admitted to ICU for Acute on chronic hypoxic hypercapnic respiratory failure requiring emergent intubation.           ASSESSMENT     - Acute encephalopathy   - Acute on chronic hypoxic hypercapnic respiratory failure  - Sepsis 2/2 UTI  - Hyperkalemia   - COPD ex   - DM uncontrol   - Chronic CHF   - Hx of CAD  - HTN HLD  -GERD         Plan     - NO precedex, pat had hyperpyrexia rxn ,improving with discontinuation of med   - Sedation and pain control for comfort and vent synchrony   - Continue vent support , adjust as per ABG   -  SAT SBT stopped today due to hyperpyrexia , ice packs   - Solumedrol  - Duonebs   - Hemodynamic monitoring   - Monitor cardiac enzymes   - 2 DECHO   - IV antibx to cover enterococcus ( hx of enterococcus UTI)   - Cultures neg   - NGT feed   - Mark cath   - Nicotine patch   - Monitor blood glucose with coverage   - DVT GI prophy .
IMP: This is a  64 yo man  from [facility], active smoker, with  CAD, CHF on nocturnal BIPAP, COPD ( MULTIPLE intubations in past), peripheral neuropathy, GERD, HTN, HLD, obesity, polyarthritis, Pulmonary HTN, TIA, Vit D Def, presents with hypoxemia from nursing facility and lethargy and altered mental status. Upon evaluation in ED, patient afebrile, bp noted to be 124/77, tachycardic to 124bpm, and noted to be hypoxic on subsequently intubated. Physical examination noted for course lung examination, mark catheter placed and immediately draining 1.5L of pyuria. Labs significant for ABG 7.19/105/230/40,  potassium noted to be 6.2,  utox positive for benzo, wbc noted to be 22 and lactate normal. UA showing findings concerning for UTI, blood cultures pending. CT head, CTA chest and abdomen pending. Patient admitted to ICU for Acute on chronic hypoxic hypercapnic respiratory failure requiring emergent intubation.           ASSESSMENT     - Acute encephalopathy   - Acute on chronic hypoxic hypercapnic respiratory failure  - Sepsis 2/2 UTI  - Hyperkalemia   - COPD ex   - DM uncontrol   - Chronic CHF   - Hx of CAD  - HTN HLD  -GERD         Plan     - Sedation and pain control for comfort and vent synchrony   - Continue vent support , adjust as per ABG   - Tolerated SAT SBT today but PS 15   - More aggressive weaning tomorr for possible extubation   - Solumedrol  - Duonebs   - Hemodynamic monitoring   - Monitor cardiac enzymes   - 2 DECHO   - IV antibx to cover enterococcus ( hx of enterococcus UTI)   - Cultures   - NGT feed   - Mark cath   - Nicotine patch   - Monitor blood glucose with coverage   - DVT GI prophy .

## 2023-10-10 NOTE — PROGRESS NOTE ADULT - SUBJECTIVE AND OBJECTIVE BOX
Patient is a 63y old  Male who presents with a chief complaint of Acute on chronic hypoxemic hypercapnic respiratory failure (10 Oct 2023 08:19)    PATIENT IS SEEN AND EXAMINED IN MEDICAL FLOOR.      ALLERGIES:  No Known Allergies      Daily     Daily Weight in k.5 (10 Oct 2023 07:00)    VITALS:    Vital Signs Last 24 Hrs  T(C): 36.3 (10 Oct 2023 17:33), Max: 38.1 (09 Oct 2023 22:00)  T(F): 97.4 (10 Oct 2023 17:33), Max: 100.6 (09 Oct 2023 22:00)  HR: 70 (10 Oct 2023 17:00) (65 - 98)  BP: 128/66 (10 Oct 2023 17:00) (121/72 - 150/82)  BP(mean): 83 (10 Oct 2023 17:00) (71 - 113)  RR: 17 (10 Oct 2023 17:00) (15 - 27)  SpO2: 93% (10 Oct 2023 17:00) (86% - 96%)        LABS:    CBC Full  -  ( 10 Oct 2023 03:37 )  WBC Count : 11.43 K/uL  RBC Count : 4.80 M/uL  Hemoglobin : 13.1 g/dL  Hematocrit : 44.1 %  Platelet Count - Automated : 175 K/uL  Mean Cell Volume : 91.9 fl  Mean Cell Hemoglobin : 27.3 pg  Mean Cell Hemoglobin Concentration : 29.7 gm/dL  Auto Neutrophil # : x  Auto Lymphocyte # : x  Auto Monocyte # : x  Auto Eosinophil # : x  Auto Basophil # : x  Auto Neutrophil % : x  Auto Lymphocyte % : x  Auto Monocyte % : x  Auto Eosinophil % : x  Auto Basophil % : x      10-10    139  |  102  |  24<H>  ----------------------------<  314<H>  4.9   |  31  |  0.65    Ca    7.8<L>      10 Oct 2023 11:10  Phos  5.3     10-10  Mg     2.4     10-10    TPro  6.3  /  Alb  2.8<L>  /  TBili  0.6  /  DBili  x   /  AST  22  /  ALT  81<H>  /  AlkPhos  68  10-10    CAPILLARY BLOOD GLUCOSE      POCT Blood Glucose.: 303 mg/dL (10 Oct 2023 15:36)  POCT Blood Glucose.: 274 mg/dL (10 Oct 2023 10:47)  POCT Blood Glucose.: 254 mg/dL (10 Oct 2023 05:31)  POCT Blood Glucose.: 190 mg/dL (09 Oct 2023 22:49)        LIVER FUNCTIONS - ( 10 Oct 2023 03:37 )  Alb: 2.8 g/dL / Pro: 6.3 g/dL / ALK PHOS: 68 U/L / ALT: 81 U/L DA / AST: 22 U/L / GGT: x           Creatinine Trend: 0.65<--, 0.68<--, 0.69<--, 0.99<--, 0.88<--, 0.88<--  I&O's Summary    09 Oct 2023 07:01  -  10 Oct 2023 07:00  --------------------------------------------------------  IN: 96.8 mL / OUT: 1325 mL / NET: -1228.2 mL    10 Oct 2023 07:01  -  10 Oct 2023 18:26  --------------------------------------------------------  IN: 0 mL / OUT: 275 mL / NET: -275 mL            Clean Catch Clean Catch (Midstream)  10-05 @ 08:30   >100,000 CFU/ml Enterococcus faecalis  --  Enterococcus faecalis      .Blood Blood-Peripheral  10-05 @ 08:15   No growth at 5 days  --  --      .Blood Blood-Peripheral  10-05 @ 08:00   No growth at 5 days  --  --      .Blood Blood-Peripheral  10-05 @ 05:35   No growth at 5 days  --  --      .Blood Blood-Peripheral  10-05 @ 05:25   No growth at 5 days  --  --          MEDICATIONS:    MEDICATIONS  (STANDING):  aspirin  chewable 81 milliGRAM(s) Oral daily  budesonide 160 MICROgram(s)/formoterol 4.5 MICROgram(s) Inhaler 2 Puff(s) Inhalation two times a day  chlorhexidine 2% Cloths 1 Application(s) Topical <User Schedule>  heparin   Injectable 5000 Unit(s) SubCutaneous every 8 hours  insulin glargine Injectable (LANTUS) 30 Unit(s) SubCutaneous at bedtime  insulin lispro (ADMELOG) corrective regimen sliding scale   SubCutaneous every 6 hours  insulin lispro Injectable (ADMELOG) 15 Unit(s) SubCutaneous every 6 hours  meropenem  IVPB 1000 milliGRAM(s) IV Intermittent every 8 hours  meropenem  IVPB      methylPREDNISolone sodium succinate Injectable 40 milliGRAM(s) IV Push daily  montelukast  Chewable 5 milliGRAM(s) Oral at bedtime  nicotine -   7 mG/24Hr(s) Patch 1 Patch Transdermal daily  pantoprazole  Injectable 40 milliGRAM(s) IV Push daily  tamsulosin 0.4 milliGRAM(s) Oral at bedtime      MEDICATIONS  (PRN):  albuterol    90 MICROgram(s) HFA Inhaler 2 Puff(s) Inhalation every 6 hours PRN Shortness of Breath and/or Wheezing      REVIEW OF SYSTEMS:                           ALL ROS DONE [ X   ]    CONSTITUTIONAL:  LETHARGIC [   ], FEVER [   ], UNRESPONSIVE [   ]  CVS:  CP  [   ], SOB, [   ], PALPITATIONS [   ], DIZZYNESS [   ]  RS: COUGH [   ], SPUTUM [   ]  GI: ABDOMINAL PAIN [   ], NAUSEA [   ], VOMITINGS [   ], DIARRHEA [   ], CONSTIPATION [   ]  :  DYSURIA [   ], NOCTURIA [   ], INCREASED FREQUENCY [   ], DRIBLING [   ],  SKELETAL: PAINFUL JOINTS [   ], SWOLLEN JOINTS [   ], NECK ACHE [   ], LOW BACK ACHE [   ],  SKIN : ULCERS [   ], RASH [   ], ITCHING [   ]  CNS: HEAD ACHE [   ], DOUBLE VISION [   ], BLURRED VISION [   ], AMS / CONFUSION [   ], SEIZURES [   ], WEAKNESS [   ],TINGLING / NUMBNESS [   ]      PHYSICAL EXAMINATION:  GENERAL APPEARANCE: NO DISTRESS  HEENT:  NO PALLOR, NO  JVD,  NO   NODES, NECK SUPPLE  CVS: S1 +, S2 +,   RS: AEEB,  OCCASIONAL  RALES +,   RONCHI  ABD: SOFT, NT, NO, BS +  EXT: NO PE  SKIN: WARM,   SKELETAL:  ROM ACCEPTABLE  CNS:  AAO X 2    RADIOLOGY :    RADIOLOGY AND READINGS REVIEWED    ASSESSMENT :       PLAN:  HPI:  Patient is a 62 yo male from [facility], active smoker, with a PMHx of CAD, CHF on nocturnal BIPAP, COPD ( MULTIPLE intubations in past), peripheral neuropathy, GERD, HTN, HLD, obesity, polyarthritis, Pulmonary HTN, TIA, Vit D Def, presents with hypoxemia from nursing facility and lethargy. Patient unable to provide medical history. As per ED, documentation patient had an abrupt onset shortness of breath, generalized weakness, elevated heart rate, hypoxia to the 70s.     Upon chart review, patient was admitted to [facility] ICU in 2022 due to acute on chronic hypoxic hypercapnic respiratory failure. Patient was eventually weaned off ventilator and then discharged home.   (05 Oct 2023 08:23)    # ACUTE ON CHRONIC HYPOXIC HYPERCAPNEIC RESPIRATORY FAILURE , ? ASPIRATION PNA  # HX OF COPD  # CHRONIC CHF    - ON MEROPENEM, F/U BCX  - CTA CHEST - ? RIGHT MIDDLE AND LOWER LOBE INFILTRATE  - ON DUONEB, SOLUMEDROL  - [ON SPIRIVA, SYMBICORT, ALBUTEROL - AT FACILITY]  - ECHO - NORMAL LV EF [50-55%], G1DD,   - CRITICAL CARE MANAGEMENT IN PROGRESS  - ID CONSULT    - s/p mechanical ventilation    - PRECEDEX DISCONTINUED DUE TO HYPERPYREXIA    # SEPSIS S/T UTI + PNA  - ON MEROPENEM, UCX [E. FAECALIS] AND BCX [NGTD]  - ID CONSULT    # ACUTE METABOLIC ENCEPHALOPATHY  - NOTED CT HEAD    # HYPERKALEMIA - IMPROVED  - S/P INSULIN + D50    # DM - LANTUS, SSI  + FS  # HTN  # HLD  # HX OF CAD  # GERD  # GI AND DVT PPX

## 2023-10-10 NOTE — SWALLOW BEDSIDE ASSESSMENT ADULT - SLP PERTINENT HISTORY OF CURRENT PROBLEM
62 yo male from [facility], active smoker, with a PMHx of CAD, CHF on nocturnal BIPAP, COPD (MULTIPLE intubations in past), peripheral neuropathy, GERD, HTN, HLD, obesity, polyarthritis, Pulmonary HTN, TIA, Vit D Def, presents with hypoxemia from nursing facility and lethargy and altered mental status. Upon evaluation in ED, patient afebrile, bp noted to be 124/77, tachycardic to 124bpm, and noted to be hypoxic on subsequently intubated. UA showing findings concerning for UTI, blood cultures pending. s/p Azithromycin, ceftriaxone. CT head, CTA chest and abdomen pending. Patient admitted to ICU for Acute on chronic hypoxic hypercapnic respiratory failure requiring emergent intubation on 10/5. Started on fenanyl drip. Patient improved and exhubated on 10/9; continued on nocturnal BIPAP. Of note, NH papers state Bipolar d/o on psych meds, but Dx not listed in H&P.

## 2023-10-10 NOTE — CHART NOTE - NSCHARTNOTEFT_GEN_A_CORE
Patient is a 64 yo male from [facility], active smoker, with a PMHx of CAD, CHF on nocturnal BIPAP, COPD ( MULTIPLE intubations in past), peripheral neuropathy, GERD, HTN, HLD, obesity, polyarthritis, Pulmonary HTN, TIA, Vit D Def, presents with hypoxemia from nursing facility and lethargy and altered mental status. Upon evaluation in ED, patient afebrile, bp noted to be 124/77, tachycardic to 124bpm, and noted to be hypoxic. Physical examination noted for course lung examination, mark catheter placed and immediately draining 1.5L of pyuria. Labs significant for ABG 7.19/105/230/40,  potassium noted to be 6.2,  utox positive for benzo, UA +, wbc noted to be 22 and lactate normal.  Given azithromycin and ceftriaxone In ED. Patient admitted up to ICU for Acute on chronic hypoxic hypercapnic respiratory failure requiring emergent intubation on 10/5.     In ICU urine cultures showed E. Facalis, and repeat blood cultures pending. Started on meropenem as per Dr. Cm. CT head neg for any hemorrhage. CTA chest showed Aspiration and/or infection primarily involving the right middle lobe and right base. CT abdomen and pelvis showed 4 cm splenic lesion increased in size compared to 3/17/2020.  Patient improved and extubated on 10/9. Once extubated patient continued on nocturnal BIPAP  and Someterol. Had a hyperthermic reaction of possibly percedex so was discontinued. Sugars were elevated and Lantus adjusted to 30 units and amalog 15, as well insulin sliding scale. <ICU Course>  Patient is a 62 yo male from [facility], active smoker, with a PMHx of CAD, CHF on nocturnal BIPAP, COPD ( MULTIPLE intubations in past), peripheral neuropathy, GERD, HTN, HLD, obesity, polyarthritis, Pulmonary HTN, TIA, Vit D Def, presents with hypoxemia from nursing facility and lethargy and altered mental status. Upon evaluation in ED, patient afebrile, bp noted to be 124/77, tachycardic to 124bpm, and noted to be hypoxic. Physical examination noted for course lung examination, mark catheter placed and immediately draining 1.5L of pyuria. Labs significant for ABG 7.19/105/230/40,  potassium noted to be 6.2,  utox positive for benzo, UA +, wbc noted to be 22 and lactate normal.  Given azithromycin and ceftriaxone In ED. Patient admitted up to ICU for Acute on chronic hypoxic hypercapnic respiratory failure requiring emergent intubation on 10/5.     In ICU urine cultures showed E. Facalis, and repeat blood cultures pending. Started on meropenem as per Dr. Cm. CT head neg for any hemorrhage. CTA chest showed Aspiration and/or infection primarily involving the right middle lobe and right base. CT abdomen and pelvis showed 4 cm splenic lesion increased in size compared to 3/17/2020.  Patient improved and extubated on 10/9. Speech and Swallow evaluated the patient and put him on soft and small bite diet regimen. Pt. also  continued on nocturnal BIPAP  and Someterol. Had a hyperthermic reaction of possibly percedex so was discontinued. Sugars were elevated and Lantus adjusted to 30 units and amalog 15, as well insulin sliding scale.    Patient is stable, improved and subsequently transferred to  under the service of Dr. Torrez and NP     <Things to follow>:    Monitor Sugars   Follow up with PT consult.  Follow up with Repeat blood cultures. <ICU Course>  Patient is a 62 yo male from [facility], active smoker, with a PMHx of CAD, CHF on nocturnal BIPAP, COPD ( MULTIPLE intubations in past), peripheral neuropathy, GERD, HTN, HLD, obesity, polyarthritis, Pulmonary HTN, TIA, Vit D Def, presents with hypoxemia from nursing facility and lethargy and altered mental status. Upon evaluation in ED, patient afebrile, bp noted to be 124/77, tachycardic to 124bpm, and noted to be hypoxic. Physical examination noted for course lung examination, mark catheter placed and immediately draining 1.5L of pyuria. Labs significant for ABG 7.19/105/230/40,  potassium noted to be 6.2,  utox positive for benzo, UA +, wbc noted to be 22 and lactate normal.  Given azithromycin and ceftriaxone In ED. Patient admitted up to ICU for Acute on chronic hypoxic hypercapnic respiratory failure requiring emergent intubation on 10/5.     In ICU urine cultures showed E. Facalis, and repeat blood cultures pending. Started on meropenem as per Dr. Cm. CT head neg for any hemorrhage. CTA chest showed Aspiration and/or infection primarily involving the right middle lobe and right base. CT abdomen and pelvis showed 4 cm splenic lesion increased in size compared to 3/17/2020.  Patient improved and extubated on 10/9. Speech and Swallow evaluated the patient and put him on soft and small bite diet regimen. Pt. also  continued on nocturnal BIPAP  and Someterol. Had a hyperthermic reaction of possibly percedex so was discontinued. Sugars were elevated and Lantus adjusted to 30 units and amalog 15, as well insulin sliding scale.    Patient is stable, improved and subsequently transferred to  in room 408C under the service of Dr. Torrez and NP Yuliya Tipton        <Things to follow>:  f/u Soila   Monitor Sugars  nocturnal bipap    Follow up with PT consult.  Follow up with Repeat blood cultures.

## 2023-10-10 NOTE — SWALLOW BEDSIDE ASSESSMENT ADULT - ORAL PREPARATORY PHASE
Reduced oral grading/Decreased mastication ability Bolus falls into right lateral sulci weak pucker to cup presentation/Anterior loss of bolus/Lateral loss of bolus

## 2023-10-10 NOTE — SWALLOW BEDSIDE ASSESSMENT ADULT - ASR SWALLOW REFERRAL
social work PSYCH:  NH papers state Bipolar d/o on psych meds, but Dx not listed in H&P (DEBBIE Tucker made aware)./occupational therapy/physical therapy/psychiatry

## 2023-10-10 NOTE — SWALLOW BEDSIDE ASSESSMENT ADULT - SPECIFY REASON(S)
Subjective assessment of swallowing function to determine least restrictive diet level given hx of dysphagia; s/p extubation; Pt pulled NGT.

## 2023-10-10 NOTE — PROGRESS NOTE ADULT - ASSESSMENT
64 yo male from [facility], active smoker, with a PMHx of CAD, CHF on nocturnal BIPAP, COPD ( MULTIPLE intubations in past), peripheral neuropathy, GERD, HTN, HLD, obesity, polyarthritis, Pulmonary HTN, TIA, Vit D Def, presents with hypoxemia from nursing facility and lethargy and altered mental status. Upon evaluation in ED, patient afebrile, bp noted to be 124/77, tachycardic to 124bpm, and noted to be hypoxic on subsequently intubated. Patient admitted to ICU for Acute on chronic hypoxic hypercapnic respiratory failure requiring emergent intubation.    #Acute encephalopathy   #Acute on chronic hypoxic hypercapnic respiratory failure  #Hyperthermia 2/2 Suspected Drug interaction (Precedex)   #Sepsis 2/2 UTI  # Hx of COPD  #Hx of DM   # Chronic CHF   # Hx of CAD  # HTN  # HLD  # GERD   # Hypertriglyceridemia     =======Neuro======  10/8 Intubated for acute hypoxic and hypercapnic respiratory failure   10/9 extubated , fentanyl dripped stropped     # Acute encephalopathy  Likely  Acute on chronic CO2 retention vs Sepsis 2/2 UTI vs CVA vs electrolyte abnormalities vs Urinary retention   p/w  lethargic upon arrival  noted to have C02 level of 105, and noted to have UTI  U-tox noted for benzos   s/p Azithromycin, ceftriaxone   CT Head: No hemorrhage  RESOLVED patient is now ANOx3      ======Cardiac=======  # HX of CAD   patient noted to be on ASA at home, continue with ASA  Troponin  neg  EKG: RBBB  Trops Neg  Triglyceride 354, , CKMB wnl      #HLD/ Hypertriglyceridemia   Triglyceride 354   propofol discontinued  monitor triglyceride levels     #Chronic CHF   patient noted to have hx of Chronic CHF  last echo noted to show EF>70%    =======Pulm=======  # Acute on chronic hypoxic hypercapnic respiratory failure   noted to have hx of COPD on Nocturnal bipap  patient intubated in ED, due to lethargy and hypoxemia   CT chest: No PE, Aspiration or infection involving the RT middle lobe and RT base.   CXR: Right peripheral lower lung opacity, possibly infiltrate.  c/w solumedrol  f/u repeat bcx   MRSA swab neg       #Hx of COPD  patient noted to be on symbicort, albuterol and spiriva at facility     =======Renal=======  # no acute issues   - c/w Flomax    =======ID=======  #Sepsis  Leukocytosis 22k >13.7  UA: (+) Bacteria, WBC, culture growing enterococcus  10/5 started meropenem ( on 5th day)  Consulted Dr. Tsai  Bcx: Neg at 24hrs    #Hyperthermia  - patient noted to be have febrile episodes Tmax of 104  - can be 2/2 drug induced 2/2 precedex vs Sepsis   - continue with Tylenol   - continue IV antibiotics  - stop precedex   - RESOLVED  =======Heme/Onc=======  # no acute issues    =======GI=======  #no acute issue   - NPO    =======Endo=======   #Hyperkalemia  RESOLVED    #HX of DM  increase lantus 30 and amaolog 15.   Last sugar 230     =======Skin/cath=======  # no acute issues    =======Prophylaxis======  DVT:  SQ heparin    ========Lines========  - peripheral IV lines         62 yo male from [facility], active smoker, with a PMHx of CAD, CHF on nocturnal BIPAP, COPD ( MULTIPLE intubations in past), peripheral neuropathy, GERD, HTN, HLD, obesity, polyarthritis, Pulmonary HTN, TIA, Vit D Def, presents with hypoxemia from nursing facility and lethargy and altered mental status. Upon evaluation in ED, patient afebrile, bp noted to be 124/77, tachycardic to 124bpm, and noted to be hypoxic on subsequently intubated. Patient admitted to ICU for Acute on chronic hypoxic hypercapnic respiratory failure requiring emergent intubation.    #Acute encephalopathy   #Acute on chronic hypoxic hypercapnic respiratory failure  #Hyperthermia 2/2 Suspected Drug interaction (Precedex)   #Sepsis 2/2 UTI  # Hx of COPD  #Hx of DM   # Chronic CHF   # Hx of CAD  # HTN  # HLD  # GERD   # Hypertriglyceridemia     =======Neuro======  10/8 Intubated for acute hypoxic and hypercapnic respiratory failure   10/9 extubated , fentanyl dripped stropped     # Acute encephalopathy  Likely  Acute on chronic CO2 retention vs Sepsis 2/2 UTI vs CVA vs electrolyte abnormalities vs Urinary retention   p/w  lethargic upon arrival  noted to have C02 level of 105, and noted to have UTI  U-tox noted for benzos   s/p Azithromycin, ceftriaxone   CT Head: No hemorrhage  RESOLVED patient is now ANOx3      ======Cardiac=======  # HX of CAD   patient noted to be on ASA at home, continue with ASA  Troponin  neg  EKG: RBBB  Trops Neg  Triglyceride 354, , CKMB wnl      #HLD/ Hypertriglyceridemia   Triglyceride 354   propofol discontinued  monitor triglyceride levels     #Chronic CHF   patient noted to have hx of Chronic CHF  last echo noted to show EF>70%    =======Pulm=======  # Acute on chronic hypoxic hypercapnic respiratory failure   noted to have hx of COPD on Nocturnal bipap  patient intubated in ED, due to lethargy and hypoxemia   CT chest: No PE, Aspiration or infection involving the RT middle lobe and RT base.   CXR: Right peripheral lower lung opacity, possibly infiltrate.  c/w solumedrol  f/u repeat bcx   MRSA swab neg       #Hx of COPD  patient noted to be on symbicort, albuterol and spiriva at facility     =======Renal=======  # no acute issues   - c/w Flomax  -mark removed    =======ID=======  #Sepsis  Leukocytosis 22k >13.7  UA: (+) Bacteria, WBC, culture growing enterococcus  10/5 started meropenem ( on 6th day)  Consulted Dr. Tsai  Bcx: Neg at 24hrs    #Hyperthermia  - patient noted to be have febrile episodes Tmax of 104  - can be 2/2 drug induced 2/2 precedex vs Sepsis   - continue with Tylenol   - continue IV antibiotics  - stop precedex   - RESOLVED  =======Heme/Onc=======  # no acute issues    =======GI=======  #no acute issue   - NPO    =======Endo=======   #Hyperkalemia  RESOLVED    #HX of DM  increase lantus 30 and amaolog 15.   Last sugar 230     =======Skin/cath=======  # no acute issues    =======Prophylaxis======  DVT:  SQ heparin    ========Lines========  - peripheral IV lines    ========Disposition ========  -Dg to

## 2023-10-10 NOTE — CHART NOTE - NSCHARTNOTEFT_GEN_A_CORE
Assessment:   Patient is a 63y old  Male who presents with a chief complaint of Acute on chronic hypoxemic hypercapnic respiratory failure (10 Oct 2023 08:19). RN consult for MST 2 or greater (?). Discussed on ICU IDR this AM, consult was to be canceled. Pt s/p extubation 10/9. for SLP. Pt now s/p SLP with PO diet ordered (solids), see below. Pt for D/G (of note: A1c 11.2)      Factors impacting intake: [ ] none [ ] nausea  [ ] vomiting [ ] diarrhea [ ] constipation  [ ]chewing problems [ ] swallowing issues  [ ] other:     Diet Prescription: soft/ bite size (thin liquids).  Intake:     Daily     Daily Weight in k.5 (10 Oct 2023 07:00)  Weight in k.5 (08 Oct 2023 07:00)  Weight in k.1 (07 Oct 2023 08:00)  Weight in k.9 (06 Oct 2023 08:00)    % Weight Change: 1+ generalized edema noted    Pertinent Medications: MEDICATIONS  (STANDING):  aspirin  chewable 81 milliGRAM(s) Oral daily  budesonide 160 MICROgram(s)/formoterol 4.5 MICROgram(s) Inhaler 2 Puff(s) Inhalation two times a day  chlorhexidine 2% Cloths 1 Application(s) Topical <User Schedule>  heparin   Injectable 5000 Unit(s) SubCutaneous every 8 hours  insulin glargine Injectable (LANTUS) 30 Unit(s) SubCutaneous at bedtime  insulin lispro (ADMELOG) corrective regimen sliding scale   SubCutaneous every 6 hours  insulin lispro Injectable (ADMELOG) 15 Unit(s) SubCutaneous every 6 hours  meropenem  IVPB 1000 milliGRAM(s) IV Intermittent every 8 hours  meropenem  IVPB      methylPREDNISolone sodium succinate Injectable 40 milliGRAM(s) IV Push daily  montelukast  Chewable 5 milliGRAM(s) Oral at bedtime  nicotine -   7 mG/24Hr(s) Patch 1 Patch Transdermal daily  OLANZapine 5 milliGRAM(s) Oral once  pantoprazole  Injectable 40 milliGRAM(s) IV Push daily  tamsulosin 0.4 milliGRAM(s) Oral at bedtime    MEDICATIONS  (PRN):  albuterol    90 MICROgram(s) HFA Inhaler 2 Puff(s) Inhalation every 6 hours PRN Shortness of Breath and/or Wheezing    Pertinent Labs: 10-10 Na139 mmol/L Glu 314 mg/dL<H> K+ 4.9 mmol/L Cr  0.65 mg/dL BUN 24 mg/dL<H> 10-10 Phos 5.3 mg/dL<H> 10-10 Alb 2.8 g/dL<L> 10-07 Chol 110 mg/dL LDL --    HDL 18 mg/dL<L> Trig 354 mg/dL<H>     CAPILLARY BLOOD GLUCOSE      POCT Blood Glucose.: 274 mg/dL (10 Oct 2023 10:47)  POCT Blood Glucose.: 254 mg/dL (10 Oct 2023 05:31)  POCT Blood Glucose.: 190 mg/dL (09 Oct 2023 22:49)  POCT Blood Glucose.: 235 mg/dL (09 Oct 2023 16:53)        Estimated Needs:   [ ] no change since previous assessment  [ ] recalculated:       Previous Nutrition Diagnosis:   [ ] Altered GI function  [x ]Inadequate Oral Intake [ ] Swallowing Difficulty   [ ] Altered nutrition related labs [ ] Increased Nutrient Needs [ ] Overweight/Obesity   [ ] Unintended Weight Loss [ ] Food & Nutrition Related Knowledge Deficit [ ] Malnutrition   [ ] Other:   Nutrition Dx:  TBD (just started on PO (solids). New dx: altered b nutrition related lab ) related to uncontrolled DM as evidenced by A1c 11.2      Interventions:   Recommend  [ ] Change Diet To:  [ ] Nutrition Supplement  [ ] Nutrition Support  [x ] Other: Consider adding consistent CHO, DASH. If not good intake may add PO supplement     Monitoring and Evaluation:   [ ] PO intake [ x ] Tolerance to diet prescription [ x ] weights [ x ] labs[ x ] follow up per protocol  [ ] other: Assessment:   Patient is a 63y old  Male who presents with a chief complaint of Acute on chronic hypoxemic hypercapnic respiratory failure (10 Oct 2023 08:19). RN consult for MST 2 or greater (?). Discussed on ICU IDR this AM, consult was to be canceled. Pt s/p extubation 10/9. for SLP. Pt now s/p SLP with PO diet ordered (solids), see below. Pt for D/G (of note: A1c 11.2)      Factors impacting intake: [ ] none [ ] nausea  [ ] vomiting [ ] diarrhea [ ] constipation  [ ]chewing problems [ ] swallowing issues  [ ] other:     Diet Prescription: soft/ bite size (thin liquids), consistent CHO, DASH/ TLC (10/10 PM)  Intake:     Daily     Daily Weight in k.5 (10 Oct 2023 07:00)  Weight in k.5 (08 Oct 2023 07:00)  Weight in k.1 (07 Oct 2023 08:00)  Weight in k.9 (06 Oct 2023 08:00)    % Weight Change: 1+ generalized edema noted    Pertinent Medications: MEDICATIONS  (STANDING):  aspirin  chewable 81 milliGRAM(s) Oral daily  budesonide 160 MICROgram(s)/formoterol 4.5 MICROgram(s) Inhaler 2 Puff(s) Inhalation two times a day  chlorhexidine 2% Cloths 1 Application(s) Topical <User Schedule>  heparin   Injectable 5000 Unit(s) SubCutaneous every 8 hours  insulin glargine Injectable (LANTUS) 30 Unit(s) SubCutaneous at bedtime  insulin lispro (ADMELOG) corrective regimen sliding scale   SubCutaneous every 6 hours  insulin lispro Injectable (ADMELOG) 15 Unit(s) SubCutaneous every 6 hours  meropenem  IVPB 1000 milliGRAM(s) IV Intermittent every 8 hours  meropenem  IVPB      methylPREDNISolone sodium succinate Injectable 40 milliGRAM(s) IV Push daily  montelukast  Chewable 5 milliGRAM(s) Oral at bedtime  nicotine -   7 mG/24Hr(s) Patch 1 Patch Transdermal daily  OLANZapine 5 milliGRAM(s) Oral once  pantoprazole  Injectable 40 milliGRAM(s) IV Push daily  tamsulosin 0.4 milliGRAM(s) Oral at bedtime    MEDICATIONS  (PRN):  albuterol    90 MICROgram(s) HFA Inhaler 2 Puff(s) Inhalation every 6 hours PRN Shortness of Breath and/or Wheezing    Pertinent Labs: 10-10 Na139 mmol/L Glu 314 mg/dL<H> K+ 4.9 mmol/L Cr  0.65 mg/dL BUN 24 mg/dL<H> 10-10 Phos 5.3 mg/dL<H> 10-10 Alb 2.8 g/dL<L> 10-07 Chol 110 mg/dL LDL --    HDL 18 mg/dL<L> Trig 354 mg/dL<H>     CAPILLARY BLOOD GLUCOSE      POCT Blood Glucose.: 274 mg/dL (10 Oct 2023 10:47)  POCT Blood Glucose.: 254 mg/dL (10 Oct 2023 05:31)  POCT Blood Glucose.: 190 mg/dL (09 Oct 2023 22:49)  POCT Blood Glucose.: 235 mg/dL (09 Oct 2023 16:53)        Estimated Needs:   [ ] no change since previous assessment  [ ] recalculated:       Previous Nutrition Diagnosis:   [ ] Altered GI function  [x ]Inadequate Oral Intake [ ] Swallowing Difficulty   [ ] Altered nutrition related labs [ ] Increased Nutrient Needs [ ] Overweight/Obesity   [ ] Unintended Weight Loss [ ] Food & Nutrition Related Knowledge Deficit [ ] Malnutrition   [ ] Other:   Nutrition Dx:  TBD (just started on PO (solids). New dx: altered b nutrition related lab ) related to uncontrolled DM as evidenced by A1c 11.2      Interventions:   Recommend  [ ] Change Diet To:  [ ] Nutrition Supplement  [ ] Nutrition Support  [x ] Other: MD to monitor. RD available. If not good intake may add PO supplement ( would consider Ensure Plant Based if needed)    Monitoring and Evaluation:   [x ] PO intake [ x ] Tolerance to diet prescription [ x ] weights [ x ] labs[ x ] follow up per protocol  [ ] other:

## 2023-10-11 ENCOUNTER — TRANSCRIPTION ENCOUNTER (OUTPATIENT)
Age: 63
End: 2023-10-11

## 2023-10-11 DIAGNOSIS — G93.40 ENCEPHALOPATHY, UNSPECIFIED: ICD-10-CM

## 2023-10-11 DIAGNOSIS — I25.10 ATHEROSCLEROTIC HEART DISEASE OF NATIVE CORONARY ARTERY WITHOUT ANGINA PECTORIS: ICD-10-CM

## 2023-10-11 DIAGNOSIS — A41.9 SEPSIS, UNSPECIFIED ORGANISM: ICD-10-CM

## 2023-10-11 DIAGNOSIS — Z02.9 ENCOUNTER FOR ADMINISTRATIVE EXAMINATIONS, UNSPECIFIED: ICD-10-CM

## 2023-10-11 DIAGNOSIS — J44.9 CHRONIC OBSTRUCTIVE PULMONARY DISEASE, UNSPECIFIED: ICD-10-CM

## 2023-10-11 DIAGNOSIS — Z29.9 ENCOUNTER FOR PROPHYLACTIC MEASURES, UNSPECIFIED: ICD-10-CM

## 2023-10-11 DIAGNOSIS — K21.9 GASTRO-ESOPHAGEAL REFLUX DISEASE WITHOUT ESOPHAGITIS: ICD-10-CM

## 2023-10-11 DIAGNOSIS — I50.9 HEART FAILURE, UNSPECIFIED: ICD-10-CM

## 2023-10-11 DIAGNOSIS — E11.9 TYPE 2 DIABETES MELLITUS WITHOUT COMPLICATIONS: ICD-10-CM

## 2023-10-11 LAB
ALBUMIN SERPL ELPH-MCNC: 2.6 G/DL — LOW (ref 3.5–5)
ALP SERPL-CCNC: 59 U/L — SIGNIFICANT CHANGE UP (ref 40–120)
ALT FLD-CCNC: 70 U/L DA — HIGH (ref 10–60)
ANION GAP SERPL CALC-SCNC: 5 MMOL/L — SIGNIFICANT CHANGE UP (ref 5–17)
AST SERPL-CCNC: 22 U/L — SIGNIFICANT CHANGE UP (ref 10–40)
BILIRUB SERPL-MCNC: 0.8 MG/DL — SIGNIFICANT CHANGE UP (ref 0.2–1.2)
BUN SERPL-MCNC: 20 MG/DL — HIGH (ref 7–18)
CALCIUM SERPL-MCNC: 8.2 MG/DL — LOW (ref 8.4–10.5)
CHLORIDE SERPL-SCNC: 102 MMOL/L — SIGNIFICANT CHANGE UP (ref 96–108)
CO2 SERPL-SCNC: 31 MMOL/L — SIGNIFICANT CHANGE UP (ref 22–31)
CREAT SERPL-MCNC: 0.56 MG/DL — SIGNIFICANT CHANGE UP (ref 0.5–1.3)
EGFR: 112 ML/MIN/1.73M2 — SIGNIFICANT CHANGE UP
GLUCOSE BLDC GLUCOMTR-MCNC: 121 MG/DL — HIGH (ref 70–99)
GLUCOSE BLDC GLUCOMTR-MCNC: 186 MG/DL — HIGH (ref 70–99)
GLUCOSE BLDC GLUCOMTR-MCNC: 202 MG/DL — HIGH (ref 70–99)
GLUCOSE BLDC GLUCOMTR-MCNC: 222 MG/DL — HIGH (ref 70–99)
GLUCOSE BLDC GLUCOMTR-MCNC: 284 MG/DL — HIGH (ref 70–99)
GLUCOSE SERPL-MCNC: 226 MG/DL — HIGH (ref 70–99)
HCT VFR BLD CALC: 40.3 % — SIGNIFICANT CHANGE UP (ref 39–50)
HGB BLD-MCNC: 12.3 G/DL — LOW (ref 13–17)
MAGNESIUM SERPL-MCNC: 2.2 MG/DL — SIGNIFICANT CHANGE UP (ref 1.6–2.6)
MCHC RBC-ENTMCNC: 27.7 PG — SIGNIFICANT CHANGE UP (ref 27–34)
MCHC RBC-ENTMCNC: 30.5 GM/DL — LOW (ref 32–36)
MCV RBC AUTO: 90.8 FL — SIGNIFICANT CHANGE UP (ref 80–100)
NRBC # BLD: 0 /100 WBCS — SIGNIFICANT CHANGE UP (ref 0–0)
PHOSPHATE SERPL-MCNC: 2.4 MG/DL — LOW (ref 2.5–4.5)
PLATELET # BLD AUTO: 184 K/UL — SIGNIFICANT CHANGE UP (ref 150–400)
POTASSIUM SERPL-MCNC: 4.1 MMOL/L — SIGNIFICANT CHANGE UP (ref 3.5–5.3)
POTASSIUM SERPL-SCNC: 4.1 MMOL/L — SIGNIFICANT CHANGE UP (ref 3.5–5.3)
PROT SERPL-MCNC: 5.8 G/DL — LOW (ref 6–8.3)
RBC # BLD: 4.44 M/UL — SIGNIFICANT CHANGE UP (ref 4.2–5.8)
RBC # FLD: 12.6 % — SIGNIFICANT CHANGE UP (ref 10.3–14.5)
SODIUM SERPL-SCNC: 138 MMOL/L — SIGNIFICANT CHANGE UP (ref 135–145)
WBC # BLD: 10.01 K/UL — SIGNIFICANT CHANGE UP (ref 3.8–10.5)
WBC # FLD AUTO: 10.01 K/UL — SIGNIFICANT CHANGE UP (ref 3.8–10.5)

## 2023-10-11 RX ORDER — ROFLUMILAST 500 UG/1
500 TABLET ORAL DAILY
Refills: 0 | Status: DISCONTINUED | OUTPATIENT
Start: 2023-10-11 | End: 2023-10-13

## 2023-10-11 RX ORDER — SODIUM CHLORIDE 9 MG/ML
1000 INJECTION, SOLUTION INTRAVENOUS
Refills: 0 | Status: DISCONTINUED | OUTPATIENT
Start: 2023-10-11 | End: 2023-10-13

## 2023-10-11 RX ORDER — INSULIN LISPRO 100/ML
VIAL (ML) SUBCUTANEOUS AT BEDTIME
Refills: 0 | Status: DISCONTINUED | OUTPATIENT
Start: 2023-10-11 | End: 2023-10-13

## 2023-10-11 RX ORDER — INSULIN LISPRO 100/ML
15 VIAL (ML) SUBCUTANEOUS
Refills: 0 | Status: DISCONTINUED | OUTPATIENT
Start: 2023-10-11 | End: 2023-10-13

## 2023-10-11 RX ORDER — INSULIN LISPRO 100/ML
VIAL (ML) SUBCUTANEOUS
Refills: 0 | Status: DISCONTINUED | OUTPATIENT
Start: 2023-10-11 | End: 2023-10-13

## 2023-10-11 RX ORDER — INSULIN GLARGINE 100 [IU]/ML
35 INJECTION, SOLUTION SUBCUTANEOUS AT BEDTIME
Refills: 0 | Status: DISCONTINUED | OUTPATIENT
Start: 2023-10-11 | End: 2023-10-13

## 2023-10-11 RX ORDER — GLUCAGON INJECTION, SOLUTION 0.5 MG/.1ML
1 INJECTION, SOLUTION SUBCUTANEOUS ONCE
Refills: 0 | Status: DISCONTINUED | OUTPATIENT
Start: 2023-10-11 | End: 2023-10-13

## 2023-10-11 RX ORDER — OLANZAPINE 15 MG/1
2.5 TABLET, FILM COATED ORAL ONCE
Refills: 0 | Status: COMPLETED | OUTPATIENT
Start: 2023-10-11 | End: 2023-10-11

## 2023-10-11 RX ORDER — SODIUM,POTASSIUM PHOSPHATES 278-250MG
1 POWDER IN PACKET (EA) ORAL ONCE
Refills: 0 | Status: COMPLETED | OUTPATIENT
Start: 2023-10-11 | End: 2023-10-11

## 2023-10-11 RX ADMIN — Medication 1 PACKET(S): at 10:37

## 2023-10-11 RX ADMIN — HEPARIN SODIUM 5000 UNIT(S): 5000 INJECTION INTRAVENOUS; SUBCUTANEOUS at 22:02

## 2023-10-11 RX ADMIN — HEPARIN SODIUM 5000 UNIT(S): 5000 INJECTION INTRAVENOUS; SUBCUTANEOUS at 13:36

## 2023-10-11 RX ADMIN — Medication 15 UNIT(S): at 08:25

## 2023-10-11 RX ADMIN — CHLORHEXIDINE GLUCONATE 1 APPLICATION(S): 213 SOLUTION TOPICAL at 07:01

## 2023-10-11 RX ADMIN — Medication 15 UNIT(S): at 11:58

## 2023-10-11 RX ADMIN — BUDESONIDE AND FORMOTEROL FUMARATE DIHYDRATE 2 PUFF(S): 160; 4.5 AEROSOL RESPIRATORY (INHALATION) at 22:04

## 2023-10-11 RX ADMIN — TAMSULOSIN HYDROCHLORIDE 0.4 MILLIGRAM(S): 0.4 CAPSULE ORAL at 22:03

## 2023-10-11 RX ADMIN — MONTELUKAST 5 MILLIGRAM(S): 4 TABLET, CHEWABLE ORAL at 22:04

## 2023-10-11 RX ADMIN — Medication 40 MILLIGRAM(S): at 07:00

## 2023-10-11 RX ADMIN — INSULIN GLARGINE 35 UNIT(S): 100 INJECTION, SOLUTION SUBCUTANEOUS at 22:03

## 2023-10-11 RX ADMIN — Medication 4: at 07:06

## 2023-10-11 RX ADMIN — OLANZAPINE 5 MILLIGRAM(S): 15 TABLET, FILM COATED ORAL at 22:03

## 2023-10-11 RX ADMIN — OLANZAPINE 2.5 MILLIGRAM(S): 15 TABLET, FILM COATED ORAL at 06:57

## 2023-10-11 RX ADMIN — MEROPENEM 100 MILLIGRAM(S): 1 INJECTION INTRAVENOUS at 22:03

## 2023-10-11 RX ADMIN — Medication 81 MILLIGRAM(S): at 12:00

## 2023-10-11 RX ADMIN — ROFLUMILAST 500 MICROGRAM(S): 500 TABLET ORAL at 22:03

## 2023-10-11 RX ADMIN — HEPARIN SODIUM 5000 UNIT(S): 5000 INJECTION INTRAVENOUS; SUBCUTANEOUS at 07:01

## 2023-10-11 RX ADMIN — MEROPENEM 100 MILLIGRAM(S): 1 INJECTION INTRAVENOUS at 08:49

## 2023-10-11 RX ADMIN — Medication 15 UNIT(S): at 17:27

## 2023-10-11 RX ADMIN — MEROPENEM 100 MILLIGRAM(S): 1 INJECTION INTRAVENOUS at 13:36

## 2023-10-11 RX ADMIN — BUDESONIDE AND FORMOTEROL FUMARATE DIHYDRATE 2 PUFF(S): 160; 4.5 AEROSOL RESPIRATORY (INHALATION) at 10:40

## 2023-10-11 RX ADMIN — Medication 1 PATCH: at 11:59

## 2023-10-11 RX ADMIN — Medication 6: at 17:27

## 2023-10-11 RX ADMIN — PANTOPRAZOLE SODIUM 40 MILLIGRAM(S): 20 TABLET, DELAYED RELEASE ORAL at 12:00

## 2023-10-11 RX ADMIN — Medication 4: at 11:59

## 2023-10-11 NOTE — PROGRESS NOTE ADULT - ASSESSMENT
64 yo male from [facility], active smoker, with a PMHx of CAD, CHF on nocturnal BIPAP, COPD ( MULTIPLE intubations in past), peripheral neuropathy, GERD, HTN, HLD, obesity, polyarthritis, Pulmonary HTN, TIA, Vit D Def, presents with hypoxemia from nursing facility and lethargy and altered mental status. Upon evaluation in ED, patient afebrile, bp noted to be 124/77, tachycardic to 124bpm, and noted to be hypoxic on subsequently intubated. Patient admitted to ICU for Acute on chronic hypoxic hypercapnic respiratory failure requiring emergent intubation. Patient extubated and transitioned to NC.   10/11: Downgraded to SCU for further management. On Meropenem until 10/12 for urosepsis. F/u Bcx. Pending PT eval for placement. On 2L and tolerating well. BiPAP QHS. ID following. Phosp replaced. Check in AM.

## 2023-10-11 NOTE — PROGRESS NOTE ADULT - PROBLEM SELECTOR PLAN 1
Likely in the setting of hypoxia with hypercapnia Vs urosepsis   Resolving.   Agitated overnight. S/p Zyprexa.  Zyprexa resumed.   Patient calm and answering questions appropriate with intermittent moments of inattention.   A&O x 3.

## 2023-10-11 NOTE — DISCHARGE NOTE PROVIDER - NSDCCPCAREPLAN_GEN_ALL_CORE_FT
PRINCIPAL DISCHARGE DIAGNOSIS  Diagnosis: Acute respiratory failure with hypoxia  Assessment and Plan of Treatment:       SECONDARY DISCHARGE DIAGNOSES  Diagnosis: Encephalopathy  Assessment and Plan of Treatment:     Diagnosis: COPD (chronic obstructive pulmonary disease)  Assessment and Plan of Treatment: Oxygen 2-3L at home    Diagnosis: DM (diabetes mellitus)  Assessment and Plan of Treatment:      PRINCIPAL DISCHARGE DIAGNOSIS  Diagnosis: Acute on chronic respiratory failure with hypoxia and hypercapnia  Assessment and Plan of Treatment: You had to be intubated on admission on 10/05/23. You were extubated on 10/09 and started on BIPAP therapy. We strongly encourage you to use BIPAP after your discharge. By using the BIPAP nightly it will help reduce readmissions and possible future intubations.  Continue to use your oxygen as prescribed. Continue using your inhalers as prescribed. Remember to rinse your mouth after using your your inhalers. Continue taking Daliresp as prescribed. Report any increase in shortness of breath immediately to your doctor. It is highly recommended that you stop smoking.      SECONDARY DISCHARGE DIAGNOSES  Diagnosis: COPD (chronic obstructive pulmonary disease)  Assessment and Plan of Treatment: End stage COPD. See above note. We recommend that you continue using BIPAP nightly as prescribed. Continuing using oxygen as prescribed. Continue using your inhalers as prescribed. Rinse your mouth after using your inhalers. Continue daliresp 500mg daily. We highly recommend that you stop smoking. Report any increase in shortness of breath or cough immediately to your doctor.    Diagnosis: Sepsis  Assessment and Plan of Treatment: You were treated with IV antibiotics for pneumonia and an urinary tract infection. You have completed all of your antibiotics.  Maintain adequate hydration to help prevent further UTIs.    Diagnosis: Encephalopathy  Assessment and Plan of Treatment: Secondary to hypoxia and hypercapnea has resolved for now.    Diagnosis: DM (diabetes mellitus)  Assessment and Plan of Treatment: Continue taking your insulin as prescribed. Rehab will continue to monitor your sugar.    Diagnosis: Nicotine addiction  Assessment and Plan of Treatment: We highly recommend that you stop smoking. Continue using your nicotine patch as prescribed.     PRINCIPAL DISCHARGE DIAGNOSIS  Diagnosis: Acute on chronic respiratory failure with hypoxia and hypercapnia  Assessment and Plan of Treatment: You had to be intubated on admission on 10/05/23. You were extubated on 10/09 and started on BIPAP therapy. We strongly encourage you to use BIPAP after your discharge. BIPAP setting 12/6 35%. By using the BIPAP nightly it will help reduce readmissions and possible future intubations.  Continue to use your oxygen as prescribed. Continue using your inhalers as prescribed. Remember to rinse your mouth after using your your inhalers. Continue taking Daliresp as prescribed. Report any increase in shortness of breath immediately to your doctor. It is highly recommended that you stop smoking.      SECONDARY DISCHARGE DIAGNOSES  Diagnosis: COPD (chronic obstructive pulmonary disease)  Assessment and Plan of Treatment: End stage COPD. See above note. We recommend that you continue using BIPAP nightly as prescribed. Continuing using oxygen as prescribed. Continue using your inhalers as prescribed. Rinse your mouth after using your inhalers. Continue daliresp 500mg daily. We highly recommend that you stop smoking. Report any increase in shortness of breath or cough immediately to your doctor.    Diagnosis: Encephalopathy  Assessment and Plan of Treatment: Secondary to hypoxia and hypercapnea has resolved for now.    Diagnosis: DM (diabetes mellitus)  Assessment and Plan of Treatment: Continue taking your insulin as prescribed. Rehab will continue to monitor your sugar.    Diagnosis: Sepsis  Assessment and Plan of Treatment: You were treated with IV antibiotics for pneumonia and an urinary tract infection. You have completed all of your antibiotics.  Maintain adequate hydration to help prevent further UTIs.    Diagnosis: Nicotine addiction  Assessment and Plan of Treatment: We highly recommend that you stop smoking. Continue using your nicotine patch as prescribed.

## 2023-10-11 NOTE — DISCHARGE NOTE PROVIDER - HOSPITAL COURSE
62 yo male from [facility], active smoker, with a PMHx of CAD, CHF on nocturnal BIPAP, COPD ( MULTIPLE intubations in past), peripheral neuropathy, GERD, HTN, HLD, obesity, polyarthritis, Pulmonary HTN, TIA, Vit D Def, presents with hypoxemia from nursing facility and lethargy and altered mental status. Upon evaluation in ED, patient afebrile, bp noted to be 124/77, tachycardic to 124bpm, and noted to be hypoxic. Physical examination noted for course lung examination, mark catheter placed and immediately draining 1.5L of pyuria. Labs significant for ABG 7.19/105/230/40,  potassium noted to be 6.2,  utox positive for benzo, UA +, wbc noted to be 22 and lactate normal.  Given azithromycin and ceftriaxone In ED. Patient admitted up to ICU for Acute on chronic hypoxic hypercapnic respiratory failure requiring emergent intubation on 10/5.     In ICU urine cultures showed E. Facalis, and repeat blood cultures pending. Started on meropenem as per Dr. Cm. CT head neg for any hemorrhage. CTA chest showed Aspiration and/or infection primarily involving the right middle lobe and right base. CT abdomen and pelvis showed 4 cm splenic lesion increased in size compared to 3/17/2020.  Patient improved and extubated on 10/9. Speech and Swallow evaluated the patient and put him on soft and small bite diet regimen. Pt. also  continued on nocturnal BIPAP  and Someterol. Had a hyperthermic reaction of possibly percedex so was discontinued  64 yo male from [facility], active smoker, with a PMHx of CAD, CHF on nocturnal BIPAP, COPD ( MULTIPLE intubations in past), peripheral neuropathy, GERD, HTN, HLD, obesity, polyarthritis, Pulmonary HTN, TIA, Vit D Def, presents with hypoxemia from nursing facility and lethargy and altered mental status. Upon evaluation in ED, patient afebrile, bp noted to be 124/77, tachycardic to 124bpm, and noted to be hypoxic. Physical examination noted for course lung examination, mark catheter placed and immediately draining 1.5L of pyuria. Labs significant for ABG 7.19/105/230/40,  potassium noted to be 6.2,  utox positive for benzo, UA +, wbc noted to be 22 and lactate normal.  Given azithromycin and ceftriaxone In ED. Patient admitted up to ICU for Acute on chronic hypoxic hypercapnic respiratory failure requiring emergent intubation on 10/5.     In ICU urine cultures showed E. Facalis, and repeat blood cultures pending. Started on meropenem as per Dr. Cm. CT head neg for any hemorrhage. CTA chest showed Aspiration and/or infection primarily involving the right middle lobe and right base. CT abdomen and pelvis showed 4 cm splenic lesion increased in size compared to 3/17/2020.  Patient improved and extubated on 10/9. Speech and Swallow evaluated the patient and put him on soft and small bite diet regimen. Pt. also  continued on nocturnal BIPAP  and Someterol. Had a hyperthermic reaction of possibly percedex so was discontinued.  INCOMPLETE********************************************************************************  64 yo male from [facility], active smoker, with a PMHx of CAD, CHF on nocturnal BIPAP, COPD ( MULTIPLE intubations in past), peripheral neuropathy, GERD, HTN, HLD, obesity, polyarthritis, Pulmonary HTN, TIA, Vit D Def, presents with hypoxemia from nursing facility and lethargy and altered mental status. Upon evaluation in ED, patient afebrile, bp noted to be 124/77, tachycardic to 124bpm, and noted to be hypoxic. Physical examination noted for course lung examination, mark catheter placed and immediately draining 1.5L of pyuria. Labs significant for ABG 7.19/105/230/40,  potassium noted to be 6.2,  utox positive for benzo, UA +, wbc noted to be 22 and lactate normal.  Given azithromycin and ceftriaxone In ED. Patient admitted up to ICU for Acute on chronic hypoxic hypercapnic respiratory failure requiring emergent intubation on 10/5.     In ICU urine cultures showed E. Facalis, and repeat blood cultures pending. Started on meropenem as per Dr. Cm. CT head neg for any hemorrhage. CTA chest showed Aspiration and/or infection primarily involving the right middle lobe and right base. CT abdomen and pelvis showed 4 cm splenic lesion increased in size compared to 3/17/2020.  Patient improved and extubated on 10/9. Speech and Swallow evaluated the patient and put him on soft and small bite diet regimen. Pt. also  continued on nocturnal BIPAP  and Someterol. Had a hyperthermic reaction of possibly percedex so was discontinued.  LABS:  CBC Full  -  ( 13 Oct 2023 06:20 )  WBC Count : 10.39 K/uL  RBC Count : 4.75 M/uL  Hemoglobin : 13.3 g/dL  Hematocrit : 43.0 %  Platelet Count - Automated : 195 K/uL  Mean Cell Volume : 90.5 fl  Mean Cell Hemoglobin : 28.0 pg  Mean Cell Hemoglobin Concentration : 30.9 gm/dL  Auto Neutrophil # : x  Auto Lymphocyte # : x  Auto Monocyte # : x  Auto Eosinophil # : x  Auto Basophil # : x  Auto Neutrophil % : x  Auto Lymphocyte % : x  Auto Monocyte % : x  Auto Eosinophil % : x  Auto Basophil % : x    10-13    141  |  105  |  12  ----------------------------<  210<H>  4.1   |  31  |  0.68    Ca    7.9<L>      13 Oct 2023 06:20  Phos  3.6     10-13  Mg     2.3     10-12    TPro  6.3  /  Alb  2.8<L>  /  TBili  0.7  /  DBili  x   /  AST  14  /  ALT  64<H>  /  AlkPhos  62  10-13

## 2023-10-11 NOTE — PROGRESS NOTE ADULT - SUBJECTIVE AND OBJECTIVE BOX
Patient is a 63y old  Male who presents with a chief complaint of Acute on chronic hypoxemic hypercapnic respiratory failure (11 Oct 2023 17:00)    PATIENT IS SEEN AND EXAMINED IN SCU.    ALLERGIES:  No Known Allergies      Daily     Daily Weight in k.2 (11 Oct 2023 04:54)    VITALS:    Vital Signs Last 24 Hrs  T(C): 36.6 (11 Oct 2023 13:00), Max: 37 (10 Oct 2023 18:39)  T(F): 97.8 (11 Oct 2023 13:00), Max: 98.6 (10 Oct 2023 18:39)  HR: 77 (11 Oct 2023 13:00) (64 - 85)  BP: 120/67 (11 Oct 2023 13:00) (120/67 - 133/76)  BP(mean): --  RR: 22 (11 Oct 2023 13:00) (18 - 22)  SpO2: 94% (11 Oct 2023 13:00) (91% - 94%)    Parameters below as of 11 Oct 2023 13:00  Patient On (Oxygen Delivery Method): nasal cannula  O2 Flow (L/min): 2      LABS:    CBC Full  -  ( 11 Oct 2023 06:25 )  WBC Count : 10.01 K/uL  RBC Count : 4.44 M/uL  Hemoglobin : 12.3 g/dL  Hematocrit : 40.3 %  Platelet Count - Automated : 184 K/uL  Mean Cell Volume : 90.8 fl  Mean Cell Hemoglobin : 27.7 pg  Mean Cell Hemoglobin Concentration : 30.5 gm/dL  Auto Neutrophil # : x  Auto Lymphocyte # : x  Auto Monocyte # : x  Auto Eosinophil # : x  Auto Basophil # : x  Auto Neutrophil % : x  Auto Lymphocyte % : x  Auto Monocyte % : x  Auto Eosinophil % : x  Auto Basophil % : x      10-11    138  |  102  |  20<H>  ----------------------------<  226<H>  4.1   |  31  |  0.56    Ca    8.2<L>      11 Oct 2023 06:25  Phos  2.4     10-11  Mg     2.2     10-11    TPro  5.8<L>  /  Alb  2.6<L>  /  TBili  0.8  /  DBili  x   /  AST  22  /  ALT  70<H>  /  AlkPhos  59  10-11    CAPILLARY BLOOD GLUCOSE      POCT Blood Glucose.: 284 mg/dL (11 Oct 2023 16:32)  POCT Blood Glucose.: 202 mg/dL (11 Oct 2023 11:44)  POCT Blood Glucose.: 186 mg/dL (11 Oct 2023 07:54)  POCT Blood Glucose.: 222 mg/dL (11 Oct 2023 06:57)  POCT Blood Glucose.: 212 mg/dL (11 Oct 2023 00:38)  POCT Blood Glucose.: 199 mg/dL (10 Oct 2023 22:40)        LIVER FUNCTIONS - ( 11 Oct 2023 06:25 )  Alb: 2.6 g/dL / Pro: 5.8 g/dL / ALK PHOS: 59 U/L / ALT: 70 U/L DA / AST: 22 U/L / GGT: x           Creatinine Trend: 0.56<--, 0.65<--, 0.68<--, 0.69<--, 0.99<--, 0.88<--  I&O's Summary    10 Oct 2023 07:01  -  11 Oct 2023 07:00  --------------------------------------------------------  IN: 0 mL / OUT: 278 mL / NET: -278 mL            .Blood Blood-Peripheral  10-09 @ 14:07   No growth at 24 hours  --  --      .Blood Blood-Peripheral  10-09 @ 14:02   No growth at 24 hours  --  --      Clean Catch Clean Catch (Midstream)  10-05 @ 08:30   >100,000 CFU/ml Enterococcus faecalis  --  Enterococcus faecalis      .Blood Blood-Peripheral  10-05 @ 08:15   No growth at 5 days  --  --      .Blood Blood-Peripheral  10-05 @ 08:00   No growth at 5 days  --  --      .Blood Blood-Peripheral  10-05 @ 05:35   No growth at 5 days  --  --      .Blood Blood-Peripheral  10-05 @ 05:25   No growth at 5 days  --  --          MEDICATIONS:    MEDICATIONS  (STANDING):  aspirin  chewable 81 milliGRAM(s) Oral daily  budesonide 160 MICROgram(s)/formoterol 4.5 MICROgram(s) Inhaler 2 Puff(s) Inhalation two times a day  chlorhexidine 2% Cloths 1 Application(s) Topical <User Schedule>  dextrose 5%. 1000 milliLiter(s) (100 mL/Hr) IV Continuous <Continuous>  glucagon  Injectable 1 milliGRAM(s) IntraMuscular once  heparin   Injectable 5000 Unit(s) SubCutaneous every 8 hours  insulin glargine Injectable (LANTUS) 35 Unit(s) SubCutaneous at bedtime  insulin lispro (ADMELOG) corrective regimen sliding scale   SubCutaneous three times a day before meals  insulin lispro (ADMELOG) corrective regimen sliding scale   SubCutaneous at bedtime  insulin lispro Injectable (ADMELOG) 15 Unit(s) SubCutaneous three times a day before meals  meropenem  IVPB 1000 milliGRAM(s) IV Intermittent every 8 hours  meropenem  IVPB      montelukast  Chewable 5 milliGRAM(s) Oral at bedtime  nicotine -   7 mG/24Hr(s) Patch 1 Patch Transdermal daily  OLANZapine 5 milliGRAM(s) Oral at bedtime  pantoprazole  Injectable 40 milliGRAM(s) IV Push daily  roflumilast 500 MICROGram(s) Oral daily  tamsulosin 0.4 milliGRAM(s) Oral at bedtime      MEDICATIONS  (PRN):  albuterol    90 MICROgram(s) HFA Inhaler 2 Puff(s) Inhalation every 6 hours PRN Shortness of Breath and/or Wheezing      REVIEW OF SYSTEMS:                           ALL ROS DONE [ X   ]    CONSTITUTIONAL:  LETHARGIC [   ], FEVER [   ], UNRESPONSIVE [   ]  CVS:  CP  [   ], SOB, [   ], PALPITATIONS [   ], DIZZYNESS [   ]  RS: COUGH [   ], SPUTUM [   ]  GI: ABDOMINAL PAIN [   ], NAUSEA [   ], VOMITINGS [   ], DIARRHEA [   ], CONSTIPATION [   ]  :  DYSURIA [   ], NOCTURIA [   ], INCREASED FREQUENCY [   ], DRIBLING [   ],  SKELETAL: PAINFUL JOINTS [   ], SWOLLEN JOINTS [   ], NECK ACHE [   ], LOW BACK ACHE [   ],  SKIN : ULCERS [   ], RASH [   ], ITCHING [   ]  CNS: HEAD ACHE [   ], DOUBLE VISION [   ], BLURRED VISION [   ], AMS / CONFUSION [   ], SEIZURES [   ], WEAKNESS [   ],TINGLING / NUMBNESS [   ]    PHYSICAL EXAMINATION:  GENERAL APPEARANCE: NO DISTRESS  HEENT:  NO PALLOR, NO  JVD,  NO   NODES, NECK SUPPLE  CVS: S1 +, S2 +,   RS: AEEB,  OCCASIONAL  RALES +,   RONCHI  ABD: SOFT, NT, NO, BS +  EXT: NO PE  SKIN: WARM,   SKELETAL:  ROM ACCEPTABLE  CNS:  AAO X 2    RADIOLOGY :    RADIOLOGY AND READINGS REVIEWED    ASSESSMENT :       PLAN:  HPI:  Patient is a 64 yo male from [facility], active smoker, with a PMHx of CAD, CHF on nocturnal BIPAP, COPD ( MULTIPLE intubations in past), peripheral neuropathy, GERD, HTN, HLD, obesity, polyarthritis, Pulmonary HTN, TIA, Vit D Def, presents with hypoxemia from nursing facility and lethargy. Patient unable to provide medical history. As per ED, documentation patient had an abrupt onset shortness of breath, generalized weakness, elevated heart rate, hypoxia to the 70s.     Upon chart review, [facility] due to acute on chronic hypoxic hypercapnic respiratory failure. Patient was eventually weaned off ventilator and then discharged home.   (05 Oct 2023 08:23)    # ACUTE ON CHRONIC HYPOXIC HYPERCAPNEIC RESPIRATORY FAILURE , ? ASPIRATION PNA  # HX OF COPD  # CHRONIC CHF    - ON MEROPENEM, F/U BCX  - CTA CHEST - ? RIGHT MIDDLE AND LOWER LOBE INFILTRATE  - ON DUONEB, SOLUMEDROL  - [ON SPIRIVA, SYMBICORT, ALBUTEROL - AT FACILITY]  - ECHO - NORMAL LV EF [50-55%], G1DD,   - CRITICAL CARE MANAGEMENT IN PROGRESS  - ID CONSULT    - s/p mechanical ventilation    - PRECEDEX DISCONTINUED DUE TO HYPERPYREXIA    # SEPSIS S/T UTI + PNA  - ON MEROPENEM, UCX [E. FAECALIS] AND BCX [NGTD]  - ID CONSULT    # ACUTE METABOLIC ENCEPHALOPATHY  - NOTED CT HEAD    # HYPERKALEMIA - IMPROVED  - S/P INSULIN + D50    # DM - LANTUS, SSI  + FS  # HTN  # HLD  # HX OF CAD  # GERD  # GI AND DVT PPX

## 2023-10-11 NOTE — PROGRESS NOTE ADULT - PROBLEM SELECTOR PLAN 9
Return to [facility] pending PT eval and better BG control.   CM following  Will need to f/u with Pulm and Cardio outpatient.

## 2023-10-11 NOTE — PROGRESS NOTE ADULT - SUBJECTIVE AND OBJECTIVE BOX
DEVIKA CARBALLO    SCU progress note    INTERVAL HPI/OVERNIGHT EVENTS: No events overnight. Downgraded from ICU.     Full code.     Covid - 19 PCR: Not detected.     The 4Ms    What Matters Most: see GOC  Age appropriate Medications/Screen for High Risk Medication: Yes  Mentation: see CAM below  Mobility: defer to physical exam    The Confusion Assessment Method (CAM) Diagnostic Algorithm     1: Acute Onset or Fluctuating Course  - Is there evidence of an acute change in mental status from the patient’s baseline? Did the (abnormal) behavior  fluctuate during the day, that is, tend to come and go, or increase and decrease in severity?  [ ] YES [ x] NO     2: Inattention  - Did the patient have difficulty focusing attention, being easily distractible, or having difficulty keeping track of what was being said?  [ ] YES [x ] NO     3: Disorganized thinking  -Was the patient’s thinking disorganized or incoherent, such as rambling or irrelevant conversation, unclear or illogical flow of ideas, or unpredictable switching from subject to subject?  [ ] YES [x ] NO    4: Altered Level of consciousness?  [ ] YES [x ] NO    The diagnosis of delirium by CAM requires the presence of features 1 and 2 and either 3 or 4.    PRESSORS: [ ] YES [x] NO  meropenem  IVPB 1000 milliGRAM(s) IV Intermittent every 8 hours  meropenem  IVPB        Cardiovascular:  Heart Failure  Acute   Acute on Chronic  Chronic         Pulmonary:  albuterol    90 MICROgram(s) HFA Inhaler 2 Puff(s) Inhalation every 6 hours PRN  budesonide 160 MICROgram(s)/formoterol 4.5 MICROgram(s) Inhaler 2 Puff(s) Inhalation two times a day  montelukast  Chewable 5 milliGRAM(s) Oral at bedtime  roflumilast 500 MICROGram(s) Oral daily    Hematalogic:  aspirin  chewable 81 milliGRAM(s) Oral daily  heparin   Injectable 5000 Unit(s) SubCutaneous every 8 hours    Other:  chlorhexidine 2% Cloths 1 Application(s) Topical <User Schedule>  dextrose 5%. 1000 milliLiter(s) IV Continuous <Continuous>  glucagon  Injectable 1 milliGRAM(s) IntraMuscular once  insulin glargine Injectable (LANTUS) 35 Unit(s) SubCutaneous at bedtime  insulin lispro (ADMELOG) corrective regimen sliding scale   SubCutaneous three times a day before meals  insulin lispro (ADMELOG) corrective regimen sliding scale   SubCutaneous at bedtime  insulin lispro Injectable (ADMELOG) 15 Unit(s) SubCutaneous three times a day before meals  nicotine -   7 mG/24Hr(s) Patch 1 Patch Transdermal daily  OLANZapine 5 milliGRAM(s) Oral at bedtime  pantoprazole  Injectable 40 milliGRAM(s) IV Push daily  tamsulosin 0.4 milliGRAM(s) Oral at bedtime    albuterol    90 MICROgram(s) HFA Inhaler 2 Puff(s) Inhalation every 6 hours PRN  aspirin  chewable 81 milliGRAM(s) Oral daily  budesonide 160 MICROgram(s)/formoterol 4.5 MICROgram(s) Inhaler 2 Puff(s) Inhalation two times a day  chlorhexidine 2% Cloths 1 Application(s) Topical <User Schedule>  dextrose 5%. 1000 milliLiter(s) IV Continuous <Continuous>  glucagon  Injectable 1 milliGRAM(s) IntraMuscular once  heparin   Injectable 5000 Unit(s) SubCutaneous every 8 hours  insulin glargine Injectable (LANTUS) 35 Unit(s) SubCutaneous at bedtime  insulin lispro (ADMELOG) corrective regimen sliding scale   SubCutaneous three times a day before meals  insulin lispro (ADMELOG) corrective regimen sliding scale   SubCutaneous at bedtime  insulin lispro Injectable (ADMELOG) 15 Unit(s) SubCutaneous three times a day before meals  meropenem  IVPB 1000 milliGRAM(s) IV Intermittent every 8 hours  meropenem  IVPB      montelukast  Chewable 5 milliGRAM(s) Oral at bedtime  nicotine -   7 mG/24Hr(s) Patch 1 Patch Transdermal daily  OLANZapine 5 milliGRAM(s) Oral at bedtime  pantoprazole  Injectable 40 milliGRAM(s) IV Push daily  roflumilast 500 MICROGram(s) Oral daily  tamsulosin 0.4 milliGRAM(s) Oral at bedtime    Drug Dosing Weight  Height (cm): 185.4 (06 Oct 2023 18:01)  Weight (kg): 93.5 (05 Oct 2023 10:30)  BMI (kg/m2): 27.2 (06 Oct 2023 18:01)  BSA (m2): 2.18 (06 Oct 2023 18:01)    CENTRAL LINE: [ ] YES [x ] NO  LOCATION:   DATE INSERTED:  REMOVE: [ ] YES [ ] NO  EXPLAIN:    AMOR: [ ] YES [x ] NO    DATE INSERTED:  REMOVE:  [ ] YES [ ] NO  EXPLAIN:    PAST MEDICAL & SURGICAL HISTORY:  COPD (chronic obstructive pulmonary disease)  Oxygen 2-3L at home      Stented coronary artery  2017      HLD (hyperlipidemia)      Pulmonary HTN      Type 2 diabetes mellitus without complication, with long-term current use of insulin      DM (diabetes mellitus)      CAD (coronary artery disease)      GERD (gastroesophageal reflux disease)      Insomnia      CHF (congestive heart failure)      HTN (hypertension)      Mood disorder      No significant past surgical history      10-10 @ 07:01  -  10-11 @ 07:00  --------------------------------------------------------  IN: 0 mL / OUT: 278 mL / NET: -278 mL      ROS: + hard of hearing (Chronic). Mild shortness of breath at rest. + Diarrhea.     PHYSICAL EXAM:    GENERAL: NAD.   HEAD:  Atraumatic, Normocephalic  EYES: EOMI, PERRLA, conjunctiva and sclera clear  ENMT: Very hard of hearing.  Moist mucous membranes, Good dentition, No lesions  NECK: Supple, No JVD, Normal thyroid  NERVOUS SYSTEM:  Alert & Oriented X3,  Moves all exts equally. Follows commands.   CHEST/LUNG: Diminished at the bases bilaterally; No rales, rhonchi, wheezing, or rubs. On 2L NC. Wet cough.   HEART: Regular rate and rhythm; No murmurs, rubs, or gallops  ABDOMEN: Soft, Nontender, Nondistended; Bowel sounds present  EXTREMITIES:  2+ Peripheral Pulses, No clubbing, cyanosis, or edema  LYMPH: No lymphadenopathy noted  SKIN: No rashes or lesions. Redness to cheeks       LABS:  CBC Full  -  ( 11 Oct 2023 06:25 )  WBC Count : 10.01 K/uL  RBC Count : 4.44 M/uL  Hemoglobin : 12.3 g/dL  Hematocrit : 40.3 %  Platelet Count - Automated : 184 K/uL  Mean Cell Volume : 90.8 fl  Mean Cell Hemoglobin : 27.7 pg  Mean Cell Hemoglobin Concentration : 30.5 gm/dL  Auto Neutrophil # : x  Auto Lymphocyte # : x  Auto Monocyte # : x  Auto Eosinophil # : x  Auto Basophil # : x  Auto Neutrophil % : x  Auto Lymphocyte % : x  Auto Monocyte % : x  Auto Eosinophil % : x  Auto Basophil % : x    10-11    138  |  102  |  20<H>  ----------------------------<  226<H>  4.1   |  31  |  0.56    Ca    8.2<L>      11 Oct 2023 06:25  Phos  2.4     10-11  Mg     2.2     10-11    TPro  5.8<L>  /  Alb  2.6<L>  /  TBili  0.8  /  DBili  x   /  AST  22  /  ALT  70<H>  /  AlkPhos  59  10-11    Urinalysis Basic - ( 11 Oct 2023 06:25 )    Color: x / Appearance: x / SG: x / pH: x  Gluc: 226 mg/dL / Ketone: x  / Bili: x / Urobili: x   Blood: x / Protein: x / Nitrite: x   Leuk Esterase: x / RBC: x / WBC x   Sq Epi: x / Non Sq Epi: x / Bacteria: x    [ x ]  DVT Prophylaxis: Heparin   [ x ]  Nutrition: Soft bite size. Consistent carb, DASH/TLC    RADIOLOGY & ADDITIONAL STUDIES:   < from: Xray Chest 1 View- PORTABLE-Urgent (Xray Chest 1 View- PORTABLE-Urgent .) (10.06.23 @ 17:41) >    ACC: [id number] EXAM:  XR CHEST PORTABLE URGENT 1V   ORDERED BY: GREGORIO BRISCOE     PROCEDURE DATE:  10/06/2023          INTERPRETATION:  TIME OF EXAM: October 6, 2023 at 4:32 PM and 4:33 PM.    CLINICAL INFORMATION: NG tube placement.    COMPARISON: October 6, 2023 at 10:37 AM.    TECHNIQUE:   AP Portable chest x-rays. Limited by rotation.    INTERPRETATION:    The heart is not enlarged. The mediastinum is not accurately evaluated on   this image.  Bilateral hilar enlargement is again seen consistent with enlarged   pulmonary arteries and right hilar lymphadenopathy seen on CT of the   chest from October 5, 2023.  ET tube tip is approximately 6.7 cm above the angela.  Enteric tube tip is just beyond the GE junction and into the stomach.  There are patchy right lower lung opacities.  The included left lung is clear.  No pleural effusion or pneumothorax is seen.  No acute bony abnormality is seen.      IMPRESSION:  Bilateral hilar enlargement again seen, consistent with   enlarged pulmonary arteries, which can be seen with pulmonary artery   hypertension, and right hilar lymphadenopathy, noted on the CT scan of   the chest from October 5, 2023.    Enteric tube tip is just beyond the GE junction and into the stomach.   Recommend advancing the enteric tube. Discussed with Dr. Moore with read   back at 2:02 PM on October 7, 2023 by Dr. Sosa.    Continued patchy right lower lung opacities.    --- End of Report ---      RED SOSA MD; Attending Radiologist  This document has been electronically signed. Oct  7 2023  2:04PM    < end of copied text >  < from: CT Head No Cont (10.05.23 @ 10:20) >  ACC: [id number] EXAM:  CT BRAIN   ORDERED BY: CHRISTIANO SEAMAN     PROCEDURE DATE:  10/05/2023          INTERPRETATION:  CLINICAL STATEMENT: Pain.    TECHNIQUE: CT of the head was performed without IV contrast.    COMPARISON: None.    FINDINGS:  There is no acute intracranial hemorrhage, parenchymal mass, mass effect   or midline shift. There is no acute territorial infarct. There is no   hydrocephalus.    The cranium is intact.    Mild mucosal thickening paranasal sinuses. Opacification mastoid air   cells and middle ear cavities. Partially visualized tube noted right oral   cavity. Prominent adenoids noted.        IMPRESSION:  No acute intracranial hemorrhage or acute territorial infarct.  If   symptoms persist, follow-up MRI exam recommended.    --- End of Report ---            LUIS VALDES MD; Attending Radiologist  This document has been electronically signed. Oct  5 2023 10:27AM    < end of copied text >  < from: CT Angio Chest PE Protocol w/ IV Cont (10.05.23 @ 10:21) >  ACC: [id number] EXAM:  CT ABDOMEN AND PELVIS IC   ORDERED BY: TERE DAIGLE     ACC: [id number] EXAM:  CT ANGIO CHEST PULM ART [facility]   ORDERED BY: CHRISTIANO SEAMAN     PROCEDURE DATE:  10/05/2023          INTERPRETATION:  CTA CHEST AND CT ABDOMEN AND PELVIS    INDICATION: Admitting Dxs: J96.01 ACUTE RESPIRATORY FAILURE WITH HYPOXIA.   Shortness of breath. Tachycardia. Rule out pulmonary embolism.    TECHNIQUE: Enhanced helical images were obtained of the chest, abdomen   and pelvis. Coronal and sagittalimages were reconstructed. Maximum   intensity projection images were generated.    CONTRAST/COMPLICATIONS:  IV Contrast: Omnipaque 350 (accession [id number]), IV contrast documented   in unlinked concurrent exam (accession [id number])  90 cc administered   10   cc discarded  Oral Contrast: NONE  Complications: None reported at time of study completion      COMPARISON: 3/17/2020 CT chest.    FINDINGS:    PULMONARY ARTERIES: No pulmonary embolism. Main pulmonary artery diameter   3.8 cm.    MEDIASTINUM: Mildly enlarged mediastinal nodes; reference right   paratracheal 2.1 x 1.2 cm node (image 61, series 6). Right hilar   lymphadenopathy; reference 2.4 x 1.2 cm right hilar node (image 78,   series 6).    Qualitatively enlarged right ventricle. Trace pericardial effusion.   Thoracic aorta normal caliber.    AIRWAYS, LUNGS, PLEURA: Satisfactory positioning of endotracheal tube.   Peribronchial thickening and mucus impacted airways of the right middle   lobe and bilateral lower lobes. Peripheral consolidation in the lateral   right middle lobe and right base. Few patchy opacities at the left base.   Emphysema. No pleural effusion.    ABDOMEN and PELVIS: Subcentimeter hepatic hypodense lesions not   adequately characterized, but similar compared to 3/17/2020 CT chest. A 4   cm splenic lesion with Hounsfield units of 39 appears increased in size   compared to 3/17/2020 where it was 1.3 cm. Left adrenal 1.4 cm nodule   unchanged compared to 3/17/2020. Right renal 2 cm hypodense lesion,   likely cyst. Additional right renal subcentimeter lesions too small to   characterize.    Gallbladder, pancreas, right adrenal gland, and left kidney unremarkable.    Amor catheter within decompressed urinary bladder. Few bubbles of gas   within the urinary bladder may be related to Amor catheter insertion.   Unremarkable prostate.    No bowel obstruction.  Colonic diverticulosis. No free fluid. No   abdominal or pelvic lymphadenopathy. Abdominal aorta normal caliber.    BONES: Unremarkable.    SOFTTISSUES: Bilateral gynecomastia.    IMPRESSION:    No pulmonary embolism.    Aspiration and/or infection primarily involving the right middle lobe and   right base.    Mildly enlarged mediastinal nodes and right hilar lymphadenopathy;   indeterminate, but could be reactive. Recommend CT chest follow-up in 3   months to assess stability.    4 cm splenic lesion increased in size compared to 3/17/2020 is   indeterminate. Consider characterization with MRI abdomen.    --- End of Report ---      < end of copied text >      Plan of care discussed with intensivist.

## 2023-10-11 NOTE — DISCHARGE NOTE PROVIDER - NSDCMRMEDTOKEN_GEN_ALL_CORE_FT
albuterol 2.5 mg/3 mL (0.083%) inhalation solution: 3 milliliter(s) by nebulizer 4 times a day  ALPRAZolam 0.5 mg oral tablet: 1 tab(s) orally 3 times a day  ammonium lactate 12% topical cream: Apply topically to affected area 2 times a day  Artificial Tears ophthalmic solution: 1 drop(s) in each eye once a day  aspirin 81 mg oral capsule: 1 cap(s) orally once a day  atorvastatin 40 mg oral tablet: 1 tab(s) orally once a day (at bedtime)  Daliresp 500 mcg oral tablet: 1 tab(s) orally once a day  finasteride 5 mg oral tablet: 1 tab(s) orally once a day  Flomax 0.4 mg oral capsule: 1 cap(s) orally 2 times a day  gabapentin 100 mg oral capsule: 1 cap(s) orally 3 times a day  ipratropium 500 mcg/2.5 mL inhalation solution: 2.5 milliliter(s) by nebulizer 4 times a day  latanoprost 0.005% ophthalmic solution: 1 drop(s) in each eye once a day  Mag-Ox 400 oral tablet: 1 tab(s) orally once a day  melatonin 3 mg oral tablet: 2 tab(s) orally once a day  Multiple Vitamins oral tablet: 1 tab(s) orally once a day  nicotine 7 mg/24 hr transdermal film, extended release: 1 patch transdermally once a day  NovoLIN 70/30 FlexPen subcutaneous suspension: 30 unit(s) subcutaneous once a day (at bedtime)  NovoLIN 70/30 subcutaneous suspension: subcutaneous  OLANZapine 5 mg oral tablet: 1 tab(s) orally once a day  pantoprazole 40 mg oral delayed release tablet: 1 tab(s) orally once a day  Percocet 10 mg-325 mg oral tablet: 1 tab(s) orally 3 times a day  polyethylene glycol 3350 oral kit: 1 orally once a day  Proventil 2.5 mg/3 mL (0.083%) inhalation solution: 3 milliliter(s) by nebulizer 4 times a day  Proventil 90 mcg/inh inhalation aerosol: 2 puff(s) inhaled every 6 hours  Saline Mist 0.65% nasal spray: 2 spray(s) intranasally 2 times a day  senna (sennosides) 8.6 mg oral tablet: 2 tab(s) orally once a day (at bedtime)  Symbicort 160 mcg-4.5 mcg/inh inhalation aerosol: 2 puff(s) inhaled 2 times a day  traZODone 150 mg oral tablet: 1 tab(s) orally once a day (at bedtime)  Tylenol 325 mg oral tablet: 2 tab(s) orally 2 times a day   Admelog 100 units/mL injectable solution: 1 unit(s) injectable 3 times a day (before meals) 2U if glucose 151-200, 4U if glucose 201-250, 6U if glucose 251-300, 8U if glucose 301-350, 10U if glucose 351-400, 12U if glucose greater than 400  Admelog 100 units/mL injectable solution: 15 unit(s) injectable 3 times a day (before meals)  albuterol 2.5 mg/3 mL (0.083%) inhalation solution: 3 milliliter(s) by nebulizer 4 times a day  ALPRAZolam 0.5 mg oral tablet: 1 tab(s) orally 3 times a day  Artificial Tears ophthalmic solution: 1 drop(s) in each eye once a day  aspirin 81 mg oral capsule: 1 cap(s) orally once a day  atorvastatin 40 mg oral tablet: 1 tab(s) orally once a day (at bedtime)  Daliresp 500 mcg oral tablet: 1 tab(s) orally once a day  finasteride 5 mg oral tablet: 1 tab(s) orally once a day  Flomax 0.4 mg oral capsule: 1 cap(s) orally 2 times a day  gabapentin 100 mg oral capsule: 1 cap(s) orally 3 times a day  ipratropium 500 mcg/2.5 mL inhalation solution: 2.5 milliliter(s) by nebulizer 4 times a day  Lantus Solostar Pen 100 units/mL subcutaneous solution: 35 unit(s) subcutaneous once a day (at bedtime)  latanoprost 0.005% ophthalmic solution: 1 drop(s) in each eye once a day  Mag-Ox 400 oral tablet: 1 tab(s) orally once a day  melatonin 3 mg oral tablet: 2 tab(s) orally once a day  Multiple Vitamins oral tablet: 1 tab(s) orally once a day  naloxone 4 mg/0.1 mL nasal spray: 4 milligram(s) intranasally prn as needed for Overdose  nicotine 7 mg/24 hr transdermal film, extended release: 1 patch transdermally once a day  OLANZapine 5 mg oral tablet: 1 tab(s) orally once a day  pantoprazole 40 mg oral delayed release tablet: 1 tab(s) orally once a day  Percocet 10 mg-325 mg oral tablet: 1 tab(s) orally 3 times a day  polyethylene glycol 3350 oral kit: 1 orally once a day  Proventil 2.5 mg/3 mL (0.083%) inhalation solution: 3 milliliter(s) by nebulizer 4 times a day  Proventil 90 mcg/inh inhalation aerosol: 2 puff(s) inhaled every 6 hours  Saline Mist 0.65% nasal spray: 2 spray(s) intranasally 2 times a day  senna (sennosides) 8.6 mg oral tablet: 2 tab(s) orally once a day (at bedtime)  Symbicort 160 mcg-4.5 mcg/inh inhalation aerosol: 2 puff(s) inhaled 2 times a day  traZODone 150 mg oral tablet: 1 tab(s) orally once a day (at bedtime)  Tylenol 325 mg oral tablet: 2 tab(s) orally 2 times a day

## 2023-10-12 DIAGNOSIS — J96.21 ACUTE AND CHRONIC RESPIRATORY FAILURE WITH HYPOXIA: ICD-10-CM

## 2023-10-12 DIAGNOSIS — Z71.89 OTHER SPECIFIED COUNSELING: ICD-10-CM

## 2023-10-12 DIAGNOSIS — F17.200 NICOTINE DEPENDENCE, UNSPECIFIED, UNCOMPLICATED: ICD-10-CM

## 2023-10-12 LAB
ALBUMIN SERPL ELPH-MCNC: 2.8 G/DL — LOW (ref 3.5–5)
ALP SERPL-CCNC: 61 U/L — SIGNIFICANT CHANGE UP (ref 40–120)
ALT FLD-CCNC: 72 U/L DA — HIGH (ref 10–60)
ANION GAP SERPL CALC-SCNC: 7 MMOL/L — SIGNIFICANT CHANGE UP (ref 5–17)
AST SERPL-CCNC: 24 U/L — SIGNIFICANT CHANGE UP (ref 10–40)
BILIRUB SERPL-MCNC: 1 MG/DL — SIGNIFICANT CHANGE UP (ref 0.2–1.2)
BUN SERPL-MCNC: 19 MG/DL — HIGH (ref 7–18)
CALCIUM SERPL-MCNC: 8.1 MG/DL — LOW (ref 8.4–10.5)
CHLORIDE SERPL-SCNC: 104 MMOL/L — SIGNIFICANT CHANGE UP (ref 96–108)
CO2 SERPL-SCNC: 28 MMOL/L — SIGNIFICANT CHANGE UP (ref 22–31)
CREAT SERPL-MCNC: 0.64 MG/DL — SIGNIFICANT CHANGE UP (ref 0.5–1.3)
EGFR: 108 ML/MIN/1.73M2 — SIGNIFICANT CHANGE UP
GLUCOSE BLDC GLUCOMTR-MCNC: 132 MG/DL — HIGH (ref 70–99)
GLUCOSE BLDC GLUCOMTR-MCNC: 135 MG/DL — HIGH (ref 70–99)
GLUCOSE BLDC GLUCOMTR-MCNC: 267 MG/DL — HIGH (ref 70–99)
GLUCOSE SERPL-MCNC: 132 MG/DL — HIGH (ref 70–99)
HCT VFR BLD CALC: 44.2 % — SIGNIFICANT CHANGE UP (ref 39–50)
HGB BLD-MCNC: 13.4 G/DL — SIGNIFICANT CHANGE UP (ref 13–17)
MAGNESIUM SERPL-MCNC: 2.3 MG/DL — SIGNIFICANT CHANGE UP (ref 1.6–2.6)
MCHC RBC-ENTMCNC: 27.7 PG — SIGNIFICANT CHANGE UP (ref 27–34)
MCHC RBC-ENTMCNC: 30.3 GM/DL — LOW (ref 32–36)
MCV RBC AUTO: 91.3 FL — SIGNIFICANT CHANGE UP (ref 80–100)
NRBC # BLD: 0 /100 WBCS — SIGNIFICANT CHANGE UP (ref 0–0)
PHOSPHATE SERPL-MCNC: 3 MG/DL — SIGNIFICANT CHANGE UP (ref 2.5–4.5)
PLATELET # BLD AUTO: 224 K/UL — SIGNIFICANT CHANGE UP (ref 150–400)
POTASSIUM SERPL-MCNC: 3.9 MMOL/L — SIGNIFICANT CHANGE UP (ref 3.5–5.3)
POTASSIUM SERPL-SCNC: 3.9 MMOL/L — SIGNIFICANT CHANGE UP (ref 3.5–5.3)
PROT SERPL-MCNC: 6.3 G/DL — SIGNIFICANT CHANGE UP (ref 6–8.3)
RBC # BLD: 4.84 M/UL — SIGNIFICANT CHANGE UP (ref 4.2–5.8)
RBC # FLD: 12.6 % — SIGNIFICANT CHANGE UP (ref 10.3–14.5)
SARS-COV-2 RNA SPEC QL NAA+PROBE: SIGNIFICANT CHANGE UP
SODIUM SERPL-SCNC: 139 MMOL/L — SIGNIFICANT CHANGE UP (ref 135–145)
WBC # BLD: 10.52 K/UL — HIGH (ref 3.8–10.5)
WBC # FLD AUTO: 10.52 K/UL — HIGH (ref 3.8–10.5)

## 2023-10-12 RX ORDER — OXYCODONE AND ACETAMINOPHEN 5; 325 MG/1; MG/1
1 TABLET ORAL EVERY 8 HOURS
Refills: 0 | Status: DISCONTINUED | OUTPATIENT
Start: 2023-10-12 | End: 2023-10-13

## 2023-10-12 RX ORDER — PANTOPRAZOLE SODIUM 20 MG/1
40 TABLET, DELAYED RELEASE ORAL
Refills: 0 | Status: DISCONTINUED | OUTPATIENT
Start: 2023-10-12 | End: 2023-10-13

## 2023-10-12 RX ADMIN — Medication 1 PATCH: at 01:24

## 2023-10-12 RX ADMIN — MONTELUKAST 5 MILLIGRAM(S): 4 TABLET, CHEWABLE ORAL at 21:54

## 2023-10-12 RX ADMIN — Medication 2: at 21:51

## 2023-10-12 RX ADMIN — TAMSULOSIN HYDROCHLORIDE 0.4 MILLIGRAM(S): 0.4 CAPSULE ORAL at 21:53

## 2023-10-12 RX ADMIN — MEROPENEM 100 MILLIGRAM(S): 1 INJECTION INTRAVENOUS at 06:47

## 2023-10-12 RX ADMIN — OLANZAPINE 5 MILLIGRAM(S): 15 TABLET, FILM COATED ORAL at 21:53

## 2023-10-12 RX ADMIN — BUDESONIDE AND FORMOTEROL FUMARATE DIHYDRATE 2 PUFF(S): 160; 4.5 AEROSOL RESPIRATORY (INHALATION) at 21:54

## 2023-10-12 RX ADMIN — OXYCODONE AND ACETAMINOPHEN 1 TABLET(S): 5; 325 TABLET ORAL at 21:53

## 2023-10-12 RX ADMIN — INSULIN GLARGINE 35 UNIT(S): 100 INJECTION, SOLUTION SUBCUTANEOUS at 21:50

## 2023-10-12 RX ADMIN — Medication 1 PATCH: at 19:43

## 2023-10-12 RX ADMIN — CHLORHEXIDINE GLUCONATE 1 APPLICATION(S): 213 SOLUTION TOPICAL at 06:46

## 2023-10-12 RX ADMIN — OXYCODONE AND ACETAMINOPHEN 1 TABLET(S): 5; 325 TABLET ORAL at 22:55

## 2023-10-12 RX ADMIN — Medication 15 UNIT(S): at 08:18

## 2023-10-12 RX ADMIN — HEPARIN SODIUM 5000 UNIT(S): 5000 INJECTION INTRAVENOUS; SUBCUTANEOUS at 06:46

## 2023-10-12 RX ADMIN — HEPARIN SODIUM 5000 UNIT(S): 5000 INJECTION INTRAVENOUS; SUBCUTANEOUS at 21:50

## 2023-10-12 RX ADMIN — BUDESONIDE AND FORMOTEROL FUMARATE DIHYDRATE 2 PUFF(S): 160; 4.5 AEROSOL RESPIRATORY (INHALATION) at 11:35

## 2023-10-12 RX ADMIN — OXYCODONE AND ACETAMINOPHEN 1 TABLET(S): 5; 325 TABLET ORAL at 13:29

## 2023-10-12 RX ADMIN — OXYCODONE AND ACETAMINOPHEN 1 TABLET(S): 5; 325 TABLET ORAL at 14:00

## 2023-10-12 RX ADMIN — Medication 15 UNIT(S): at 17:41

## 2023-10-12 RX ADMIN — ROFLUMILAST 500 MICROGRAM(S): 500 TABLET ORAL at 12:47

## 2023-10-12 NOTE — PROGRESS NOTE ADULT - ASSESSMENT
UTI  Fevers - resolved  Leukocytosis - normalized      Plan - Completed antibiotic course  DC planning  reconsult prn.

## 2023-10-12 NOTE — PROGRESS NOTE ADULT - SUBJECTIVE AND OBJECTIVE BOX
63y Male    Meds:    Allergies    No Known Allergies    Intolerances        VITALS:  Vital Signs Last 24 Hrs  T(C): 36.4 (12 Oct 2023 13:25), Max: 36.6 (12 Oct 2023 05:20)  T(F): 97.5 (12 Oct 2023 13:25), Max: 97.8 (12 Oct 2023 05:20)  HR: 97 (12 Oct 2023 13:25) (71 - 97)  BP: 115/77 (12 Oct 2023 13:25) (115/77 - 120/77)  BP(mean): --  RR: 20 (12 Oct 2023 13:25) (19 - 22)  SpO2: 96% (12 Oct 2023 13:25) (95% - 97%)    Parameters below as of 12 Oct 2023 13:25  Patient On (Oxygen Delivery Method): nasal cannula  O2 Flow (L/min): 2      LABS/DIAGNOSTIC TESTS:                          13.4   10.52 )-----------( 224      ( 12 Oct 2023 06:50 )             44.2         10-12    139  |  104  |  19<H>  ----------------------------<  132<H>  3.9   |  28  |  0.64    Ca    8.1<L>      12 Oct 2023 06:50  Phos  3.0     10-12  Mg     2.3     10-12    TPro  6.3  /  Alb  2.8<L>  /  TBili  1.0  /  DBili  x   /  AST  24  /  ALT  72<H>  /  AlkPhos  61  10-12      LIVER FUNCTIONS - ( 12 Oct 2023 06:50 )  Alb: 2.8 g/dL / Pro: 6.3 g/dL / ALK PHOS: 61 U/L / ALT: 72 U/L DA / AST: 24 U/L / GGT: x             CULTURES: .Blood Blood-Peripheral  10-09 @ 14:07   No growth at 48 Hours  --  --      .Blood Blood-Peripheral  10-09 @ 14:02   No growth at 48 Hours  --  --      Clean Catch Clean Catch (Midstream)  10-05 @ 08:30   >100,000 CFU/ml Enterococcus faecalis  --  Enterococcus faecalis      .Blood Blood-Peripheral  10-05 @ 08:15   No growth at 5 days  --  --      .Blood Blood-Peripheral  10-05 @ 08:00   No growth at 5 days  --  --      .Blood Blood-Peripheral  10-05 @ 05:35   No growth at 5 days  --  --      .Blood Blood-Peripheral  10-05 @ 05:25   No growth at 5 days  --  --            RADIOLOGY:      ROS:  [  ] UNABLE TO ELICIT 63y Male who is doing well and is no longer confused and has no fevers or chills, he is not SOB, has no urinary symptoms, he had some diarrhea but has resolved now. He completed his Meropenem for a UTI today and is going to be discharged tomorrow.    Meds:    Allergies    No Known Allergies    Intolerances        VITALS:  Vital Signs Last 24 Hrs  T(C): 36.4 (12 Oct 2023 13:25), Max: 36.6 (12 Oct 2023 05:20)  T(F): 97.5 (12 Oct 2023 13:25), Max: 97.8 (12 Oct 2023 05:20)  HR: 97 (12 Oct 2023 13:25) (71 - 97)  BP: 115/77 (12 Oct 2023 13:25) (115/77 - 120/77)  BP(mean): --  RR: 20 (12 Oct 2023 13:25) (19 - 22)  SpO2: 96% (12 Oct 2023 13:25) (95% - 97%)    Parameters below as of 12 Oct 2023 13:25  Patient On (Oxygen Delivery Method): nasal cannula  O2 Flow (L/min): 2      LABS/DIAGNOSTIC TESTS:                          13.4   10.52 )-----------( 224      ( 12 Oct 2023 06:50 )             44.2         10-12    139  |  104  |  19<H>  ----------------------------<  132<H>  3.9   |  28  |  0.64    Ca    8.1<L>      12 Oct 2023 06:50  Phos  3.0     10-12  Mg     2.3     10-12    TPro  6.3  /  Alb  2.8<L>  /  TBili  1.0  /  DBili  x   /  AST  24  /  ALT  72<H>  /  AlkPhos  61  10-12      LIVER FUNCTIONS - ( 12 Oct 2023 06:50 )  Alb: 2.8 g/dL / Pro: 6.3 g/dL / ALK PHOS: 61 U/L / ALT: 72 U/L DA / AST: 24 U/L / GGT: x             CULTURES: .Blood Blood-Peripheral  10-09 @ 14:07   No growth at 48 Hours  --  --      .Blood Blood-Peripheral  10-09 @ 14:02   No growth at 48 Hours  --  --      Clean Catch Clean Catch (Midstream)  10-05 @ 08:30   >100,000 CFU/ml Enterococcus faecalis  --  Enterococcus faecalis      .Blood Blood-Peripheral  10-05 @ 08:15   No growth at 5 days  --  --      .Blood Blood-Peripheral  10-05 @ 08:00   No growth at 5 days  --  --      .Blood Blood-Peripheral  10-05 @ 05:35   No growth at 5 days  --  --      .Blood Blood-Peripheral  10-05 @ 05:25   No growth at 5 days  --  --            RADIOLOGY:      ROS:  [  ] UNABLE TO ELICIT

## 2023-10-12 NOTE — PROGRESS NOTE ADULT - PROBLEM SELECTOR PLAN 1
Likely secondary to COPD exacerbation.  GOLD 4D  intubated upon admission and extubated 10/09.  Multiply intubations in the past. Noncompliant with BIPAP  Encourage BIPAP usage nightly and as needed during the day.  Continue oxygen supplementation. Monitor oxygen supplementation. Likely secondary to COPD exacerbation.  GOLD 4D  intubated upon admission and extubated 10/09.  Multiply intubations in the past. Noncompliant with BIPAP  Encourage BIPAP usage nightly and as needed during the day.  Continue oxygen supplementation. Monitor oxygen supplementation.  Bronchodilators and ICS  Restart daliresp

## 2023-10-12 NOTE — CHART NOTE - NSCHARTNOTESELECT_GEN_ALL_CORE
Family Update Note/Event Note
Family update/Event Note
Family/Event Note
Transfer Note
Nutrition Services

## 2023-10-12 NOTE — PROGRESS NOTE ADULT - RESPIRATORY
clear to auscultation bilaterally/no wheezes/no rales/no rhonchi
normal/clear to auscultation bilaterally/no wheezes/no rales/no rhonchi

## 2023-10-12 NOTE — PROGRESS NOTE ADULT - ASSESSMENT
64 yo male from [facility], active smoker, with a PMHx of CAD, CHF on nocturnal BIPAP, COPD ( MULTIPLE intubations in past), peripheral neuropathy, GERD, HTN, HLD, obesity, polyarthritis, Pulmonary HTN, TIA, Vit D Def, presents with hypoxemia from nursing facility and lethargy and altered mental status. Upon evaluation in ED, patient afebrile, bp noted to be 124/77, tachycardic to 124bpm, and noted to be hypoxic on subsequently intubated. Patient admitted to ICU for Acute on chronic hypoxic hypercapnic respiratory failure requiring emergent intubation. Patient extubated and transitioned to NC.   10/11: Downgraded to SCU for further management. On Meropenem until 10/12 for urosepsis. F/u Bcx. Pending PT eval for placement. On 2L and tolerating well. BiPAP QHS. ID following. Phosp replaced. Check in AM.    62 yo male from [facility], active smoker, with a PMHx of CAD, CHF on nocturnal BIPAP, COPD ( MULTIPLE intubations in past), peripheral neuropathy, GERD, HTN, HLD, obesity, polyarthritis, Pulmonary HTN, TIA, Vit D Def, presents with hypoxemia from nursing facility and lethargy and altered mental status. Upon evaluation in ED, patient afebrile, bp noted to be 124/77, tachycardic to 124bpm, and noted to be hypoxic on subsequently intubated. Patient admitted to ICU for Acute on chronic hypoxic hypercapnic respiratory failure requiring emergent intubation. Patient extubated and transitioned to NC.   10/11: Downgraded to SCU for further management. On Meropenem until 10/12 for urosepsis. F/u Bcx. Pending PT eval for placement. On 2L and tolerating well. BiPAP QHS. ID following. Phos replaced. Check in AM.

## 2023-10-12 NOTE — PHYSICAL THERAPY INITIAL EVALUATION ADULT - GENERAL OBSERVATIONS, REHAB EVAL
Patient received in supine, AxOX3, reports back pain, on O2 @ 2 Li NC; presents w/ periodic SOB w/ minimal activity.

## 2023-10-12 NOTE — PROGRESS NOTE ADULT - SUBJECTIVE AND OBJECTIVE BOX
Patient is a 63y old  Male who presents with a chief complaint of Acute on chronic hypoxemic hypercapnic respiratory failure (11 Oct 2023 18:04)    PATIENT IS SEEN AND EXAMINED IN MEDICAL FLOOR.  JAMILA [    ]    MT [   ]      GT [   ]    ALLERGIES:  No Known Allergies      Daily     Daily     VITALS:    Vital Signs Last 24 Hrs  T(C): 36.6 (12 Oct 2023 05:20), Max: 36.6 (11 Oct 2023 13:00)  T(F): 97.8 (12 Oct 2023 05:20), Max: 97.8 (11 Oct 2023 13:00)  HR: 75 (12 Oct 2023 05:20) (71 - 77)  BP: 120/55 (12 Oct 2023 05:20) (117/67 - 120/67)  BP(mean): --  RR: 22 (12 Oct 2023 05:20) (19 - 22)  SpO2: 96% (12 Oct 2023 05:20) (94% - 97%)    Parameters below as of 12 Oct 2023 05:20  Patient On (Oxygen Delivery Method): nasal cannula  O2 Flow (L/min): 2      LABS:    CBC Full  -  ( 12 Oct 2023 06:50 )  WBC Count : 10.52 K/uL  RBC Count : 4.84 M/uL  Hemoglobin : 13.4 g/dL  Hematocrit : 44.2 %  Platelet Count - Automated : 224 K/uL  Mean Cell Volume : 91.3 fl  Mean Cell Hemoglobin : 27.7 pg  Mean Cell Hemoglobin Concentration : 30.3 gm/dL  Auto Neutrophil # : x  Auto Lymphocyte # : x  Auto Monocyte # : x  Auto Eosinophil # : x  Auto Basophil # : x  Auto Neutrophil % : x  Auto Lymphocyte % : x  Auto Monocyte % : x  Auto Eosinophil % : x  Auto Basophil % : x      10-12    139  |  104  |  19<H>  ----------------------------<  132<H>  3.9   |  28  |  0.64    Ca    8.1<L>      12 Oct 2023 06:50  Phos  3.0     10-12  Mg     2.3     10-12    TPro  6.3  /  Alb  2.8<L>  /  TBili  1.0  /  DBili  x   /  AST  24  /  ALT  72<H>  /  AlkPhos  61  10-12    CAPILLARY BLOOD GLUCOSE      POCT Blood Glucose.: 135 mg/dL (12 Oct 2023 08:06)  POCT Blood Glucose.: 121 mg/dL (11 Oct 2023 21:12)  POCT Blood Glucose.: 284 mg/dL (11 Oct 2023 16:32)  POCT Blood Glucose.: 202 mg/dL (11 Oct 2023 11:44)        LIVER FUNCTIONS - ( 12 Oct 2023 06:50 )  Alb: 2.8 g/dL / Pro: 6.3 g/dL / ALK PHOS: 61 U/L / ALT: 72 U/L DA / AST: 24 U/L / GGT: x           Creatinine Trend: 0.64<--, 0.56<--, 0.65<--, 0.68<--, 0.69<--, 0.99<--  I&O's Summary    11 Oct 2023 07:01  -  12 Oct 2023 07:00  --------------------------------------------------------  IN: 0 mL / OUT: 850 mL / NET: -850 mL            .Blood Blood-Peripheral  10-09 @ 14:07   No growth at 48 Hours  --  --      .Blood Blood-Peripheral  10-09 @ 14:02   No growth at 48 Hours  --  --      Clean Catch Clean Catch (Midstream)  10-05 @ 08:30   >100,000 CFU/ml Enterococcus faecalis  --  Enterococcus faecalis      .Blood Blood-Peripheral  10-05 @ 08:15   No growth at 5 days  --  --      .Blood Blood-Peripheral  10-05 @ 08:00   No growth at 5 days  --  --      .Blood Blood-Peripheral  10-05 @ 05:35   No growth at 5 days  --  --      .Blood Blood-Peripheral  10-05 @ 05:25   No growth at 5 days  --  --          MEDICATIONS:    MEDICATIONS  (STANDING):  aspirin  chewable 81 milliGRAM(s) Oral daily  budesonide 160 MICROgram(s)/formoterol 4.5 MICROgram(s) Inhaler 2 Puff(s) Inhalation two times a day  chlorhexidine 2% Cloths 1 Application(s) Topical <User Schedule>  dextrose 5%. 1000 milliLiter(s) (100 mL/Hr) IV Continuous <Continuous>  glucagon  Injectable 1 milliGRAM(s) IntraMuscular once  heparin   Injectable 5000 Unit(s) SubCutaneous every 8 hours  insulin glargine Injectable (LANTUS) 35 Unit(s) SubCutaneous at bedtime  insulin lispro (ADMELOG) corrective regimen sliding scale   SubCutaneous at bedtime  insulin lispro (ADMELOG) corrective regimen sliding scale   SubCutaneous three times a day before meals  insulin lispro Injectable (ADMELOG) 15 Unit(s) SubCutaneous three times a day before meals  meropenem  IVPB 1000 milliGRAM(s) IV Intermittent every 8 hours  meropenem  IVPB      montelukast  Chewable 5 milliGRAM(s) Oral at bedtime  nicotine -   7 mG/24Hr(s) Patch 1 Patch Transdermal daily  OLANZapine 5 milliGRAM(s) Oral at bedtime  pantoprazole  Injectable 40 milliGRAM(s) IV Push daily  roflumilast 500 MICROGram(s) Oral daily  tamsulosin 0.4 milliGRAM(s) Oral at bedtime      MEDICATIONS  (PRN):  albuterol    90 MICROgram(s) HFA Inhaler 2 Puff(s) Inhalation every 6 hours PRN Shortness of Breath and/or Wheezing      REVIEW OF SYSTEMS:                           ALL ROS DONE [ X   ]    CONSTITUTIONAL:  LETHARGIC [   ], FEVER [   ], UNRESPONSIVE [   ]  CVS:  CP  [   ], SOB, [   ], PALPITATIONS [   ], DIZZYNESS [   ]  RS: COUGH [   ], SPUTUM [   ]  GI: ABDOMINAL PAIN [   ], NAUSEA [   ], VOMITINGS [   ], DIARRHEA [   ], CONSTIPATION [   ]  :  DYSURIA [   ], NOCTURIA [   ], INCREASED FREQUENCY [   ], DRIBLING [   ],  SKELETAL: PAINFUL JOINTS [   ], SWOLLEN JOINTS [   ], NECK ACHE [   ], LOW BACK ACHE [   ],  SKIN : ULCERS [   ], RASH [   ], ITCHING [   ]  CNS: HEAD ACHE [   ], DOUBLE VISION [   ], BLURRED VISION [   ], AMS / CONFUSION [   ], SEIZURES [   ], WEAKNESS [   ],TINGLING / NUMBNESS [   ]      PHYSICAL EXAMINATION:  GENERAL APPEARANCE: NO DISTRESS  HEENT:  NO PALLOR, NO  JVD,  NO   NODES, NECK SUPPLE  CVS: S1 +, S2 +,   RS: AEEB,  OCCASIONAL  RALES +,   RONCHI  ABD: SOFT, NT, NO, BS +  EXT: NO PE  SKIN: WARM,   SKELETAL:  ROM ACCEPTABLE  CNS:  AAO X 2    RADIOLOGY :    RADIOLOGY AND READINGS REVIEWED    ASSESSMENT :       PLAN:  HPI:  Patient is a 64 yo male from [facility], active smoker, with a PMHx of CAD, CHF on nocturnal BIPAP, COPD ( MULTIPLE intubations in past), peripheral neuropathy, GERD, HTN, HLD, obesity, polyarthritis, Pulmonary HTN, TIA, Vit D Def, presents with hypoxemia from nursing facility and lethargy. Patient unable to provide medical history. As per ED, documentation patient had an abrupt onset shortness of breath, generalized weakness, elevated heart rate, hypoxia to the 70s.     Upon chart review, patient was admitted to [facility] ICU in March 2022 due to acute on chronic hypoxic hypercapnic respiratory failure. Patient was eventually weaned off ventilator and then discharged home.   (05 Oct 2023 08:23)    # ACUTE ON CHRONIC HYPOXIC HYPERCAPNEIC RESPIRATORY FAILURE , ? ASPIRATION PNA  # HX OF COPD  # CHRONIC CHF    - ON MEROPENEM, F/U BCX  - CTA CHEST - ? RIGHT MIDDLE AND LOWER LOBE INFILTRATE  - ON DUONEB, SOLUMEDROL  - [ON SPIRIVA, SYMBICORT, ALBUTEROL - AT FACILITY]  - ECHO - NORMAL LV EF [50-55%], G1DD,   - CRITICAL CARE MANAGEMENT IN PROGRESS  - ID CONSULT    - s/p mechanical ventilation    - PRECEDEX DISCONTINUED DUE TO HYPERPYREXIA    # SEPSIS S/T UTI + PNA  - ON MEROPENEM, UCX [E. FAECALIS] AND BCX [NGTD]  - ID CONSULT    # ACUTE METABOLIC ENCEPHALOPATHY  - NOTED CT HEAD    # HYPERKALEMIA - IMPROVED  - S/P INSULIN + D50    # DM - LANTUS, SSI  + FS  # HTN  # HLD  # HX OF CAD  # GERD  # GI AND DVT PPX   Patient is a 63y old  Male who presents with a chief complaint of Acute on chronic hypoxemic hypercapnic respiratory failure (11 Oct 2023 18:04)    PATIENT IS SEEN AND EXAMINED IN MEDICAL FLOOR.    ALLERGIES:  No Known Allergies    VITALS:    Vital Signs Last 24 Hrs  T(C): 36.6 (12 Oct 2023 05:20), Max: 36.6 (11 Oct 2023 13:00)  T(F): 97.8 (12 Oct 2023 05:20), Max: 97.8 (11 Oct 2023 13:00)  HR: 75 (12 Oct 2023 05:20) (71 - 77)  BP: 120/55 (12 Oct 2023 05:20) (117/67 - 120/67)  BP(mean): --  RR: 22 (12 Oct 2023 05:20) (19 - 22)  SpO2: 96% (12 Oct 2023 05:20) (94% - 97%)    Parameters below as of 12 Oct 2023 05:20  Patient On (Oxygen Delivery Method): nasal cannula  O2 Flow (L/min): 2      LABS:    CBC Full  -  ( 12 Oct 2023 06:50 )  WBC Count : 10.52 K/uL  RBC Count : 4.84 M/uL  Hemoglobin : 13.4 g/dL  Hematocrit : 44.2 %  Platelet Count - Automated : 224 K/uL  Mean Cell Volume : 91.3 fl  Mean Cell Hemoglobin : 27.7 pg  Mean Cell Hemoglobin Concentration : 30.3 gm/dL  Auto Neutrophil # : x  Auto Lymphocyte # : x  Auto Monocyte # : x  Auto Eosinophil # : x  Auto Basophil # : x  Auto Neutrophil % : x  Auto Lymphocyte % : x  Auto Monocyte % : x  Auto Eosinophil % : x  Auto Basophil % : x      10-12    139  |  104  |  19<H>  ----------------------------<  132<H>  3.9   |  28  |  0.64    Ca    8.1<L>      12 Oct 2023 06:50  Phos  3.0     10-12  Mg     2.3     10-12    TPro  6.3  /  Alb  2.8<L>  /  TBili  1.0  /  DBili  x   /  AST  24  /  ALT  72<H>  /  AlkPhos  61  10-12    CAPILLARY BLOOD GLUCOSE      POCT Blood Glucose.: 135 mg/dL (12 Oct 2023 08:06)  POCT Blood Glucose.: 121 mg/dL (11 Oct 2023 21:12)  POCT Blood Glucose.: 284 mg/dL (11 Oct 2023 16:32)  POCT Blood Glucose.: 202 mg/dL (11 Oct 2023 11:44)        LIVER FUNCTIONS - ( 12 Oct 2023 06:50 )  Alb: 2.8 g/dL / Pro: 6.3 g/dL / ALK PHOS: 61 U/L / ALT: 72 U/L DA / AST: 24 U/L / GGT: x           Creatinine Trend: 0.64<--, 0.56<--, 0.65<--, 0.68<--, 0.69<--, 0.99<--  I&O's Summary    11 Oct 2023 07:01  -  12 Oct 2023 07:00  --------------------------------------------------------  IN: 0 mL / OUT: 850 mL / NET: -850 mL            .Blood Blood-Peripheral  10-09 @ 14:07   No growth at 48 Hours  --  --      .Blood Blood-Peripheral  10-09 @ 14:02   No growth at 48 Hours  --  --      Clean Catch Clean Catch (Midstream)  10-05 @ 08:30   >100,000 CFU/ml Enterococcus faecalis  --  Enterococcus faecalis      .Blood Blood-Peripheral  10-05 @ 08:15   No growth at 5 days  --  --      .Blood Blood-Peripheral  10-05 @ 08:00   No growth at 5 days  --  --      .Blood Blood-Peripheral  10-05 @ 05:35   No growth at 5 days  --  --      .Blood Blood-Peripheral  10-05 @ 05:25   No growth at 5 days  --  --          MEDICATIONS:    MEDICATIONS  (STANDING):  aspirin  chewable 81 milliGRAM(s) Oral daily  budesonide 160 MICROgram(s)/formoterol 4.5 MICROgram(s) Inhaler 2 Puff(s) Inhalation two times a day  chlorhexidine 2% Cloths 1 Application(s) Topical <User Schedule>  dextrose 5%. 1000 milliLiter(s) (100 mL/Hr) IV Continuous <Continuous>  glucagon  Injectable 1 milliGRAM(s) IntraMuscular once  heparin   Injectable 5000 Unit(s) SubCutaneous every 8 hours  insulin glargine Injectable (LANTUS) 35 Unit(s) SubCutaneous at bedtime  insulin lispro (ADMELOG) corrective regimen sliding scale   SubCutaneous at bedtime  insulin lispro (ADMELOG) corrective regimen sliding scale   SubCutaneous three times a day before meals  insulin lispro Injectable (ADMELOG) 15 Unit(s) SubCutaneous three times a day before meals  meropenem  IVPB 1000 milliGRAM(s) IV Intermittent every 8 hours  meropenem  IVPB      montelukast  Chewable 5 milliGRAM(s) Oral at bedtime  nicotine -   7 mG/24Hr(s) Patch 1 Patch Transdermal daily  OLANZapine 5 milliGRAM(s) Oral at bedtime  pantoprazole  Injectable 40 milliGRAM(s) IV Push daily  roflumilast 500 MICROGram(s) Oral daily  tamsulosin 0.4 milliGRAM(s) Oral at bedtime      MEDICATIONS  (PRN):  albuterol    90 MICROgram(s) HFA Inhaler 2 Puff(s) Inhalation every 6 hours PRN Shortness of Breath and/or Wheezing      REVIEW OF SYSTEMS:                           ALL ROS DONE [ X   ]    CONSTITUTIONAL:  LETHARGIC [   ], FEVER [   ], UNRESPONSIVE [   ]  CVS:  CP  [   ], SOB, [   ], PALPITATIONS [   ], DIZZYNESS [   ]  RS: COUGH [   ], SPUTUM [   ]  GI: ABDOMINAL PAIN [   ], NAUSEA [   ], VOMITINGS [   ], DIARRHEA [   ], CONSTIPATION [   ]  :  DYSURIA [   ], NOCTURIA [   ], INCREASED FREQUENCY [   ], DRIBLING [   ],  SKELETAL: PAINFUL JOINTS [   ], SWOLLEN JOINTS [   ], NECK ACHE [   ], LOW BACK ACHE [   ],  SKIN : ULCERS [   ], RASH [   ], ITCHING [   ]  CNS: HEAD ACHE [   ], DOUBLE VISION [   ], BLURRED VISION [   ], AMS / CONFUSION [   ], SEIZURES [   ], WEAKNESS [   ],TINGLING / NUMBNESS [   ]      PHYSICAL EXAMINATION:  GENERAL APPEARANCE: NO DISTRESS  HEENT:  NO PALLOR, NO  JVD,  NO   NODES, NECK SUPPLE  CVS: S1 +, S2 +,   RS: AEEB,  OCCASIONAL  RALES +,   RONCHI  ABD: SOFT, NT, NO, BS +  EXT: NO PE  SKIN: WARM,   SKELETAL:  ROM ACCEPTABLE  CNS:  AAO X 2    RADIOLOGY :    RADIOLOGY AND READINGS REVIEWED    ASSESSMENT :       PLAN:  HPI:  Patient is a 64 yo male from [facility], active smoker, with a PMHx of CAD, CHF on nocturnal BIPAP, COPD ( MULTIPLE intubations in past), peripheral neuropathy, GERD, HTN, HLD, obesity, polyarthritis, Pulmonary HTN, TIA, Vit D Def, presents with hypoxemia from nursing facility and lethargy. Patient unable to provide medical history. As per ED, documentation patient had an abrupt onset shortness of breath, generalized weakness, elevated heart rate, hypoxia to the 70s.     Upon chart review, patient was admitted to [facility] ICU in March 2022 due to acute on chronic hypoxic hypercapnic respiratory failure. Patient was eventually weaned off ventilator and then discharged home.   (05 Oct 2023 08:23)    # ACUTE ON CHRONIC HYPOXIC HYPERCAPNEIC RESPIRATORY FAILURE , ? ASPIRATION PNA  # HX OF COPD  # CHRONIC CHF    - S/P MEROPENEM, F/U BCX [NGTD]  - CTA CHEST - ? RIGHT MIDDLE AND LOWER LOBE INFILTRATE  - ON DUONEB, SOLUMEDROL  - [ON SPIRIVA, SYMBICORT, ALBUTEROL - AT FACILITY]  - ECHO - NORMAL LV EF [50-55%], G1DD,   - CRITICAL CARE MANAGEMENT IN PROGRESS  - ID CONSULT    - DALIRESP  - s/p mechanical ventilation    - PRECEDEX DISCONTINUED DUE TO HYPERPYREXIA    # SEPSIS S/T UTI + PNA  - S/P MEROPENEM, UCX [E. FAECALIS] AND BCX [NGTD]  - ID CONSULT    # ACUTE METABOLIC ENCEPHALOPATHY  - NOTED CT HEAD    # HYPERKALEMIA - IMPROVED  - S/P INSULIN + D50    # DM - LANTUS, SSI  + FS  # HTN  # HLD  # HX OF CAD  # GERD  # GI AND DVT PPX

## 2023-10-12 NOTE — PROGRESS NOTE ADULT - SUBJECTIVE AND OBJECTIVE BOX
DEVIKA CARBALLO    SCU progress note    INTERVAL HPI/OVERNIGHT EVENTS: ***Refused BIPAP last night.    DNR [ ]   DNI  [  ]  FULL CODE    Covid - 19 PCR:     The 4Ms    What Matters Most: see GOC  Age appropriate Medications/Screen for High Risk Medication: Yes  Mentation: see CAM below  Mobility: defer to physical exam    The Confusion Assessment Method (CAM) Diagnostic Algorithm     1: Acute Onset or Fluctuating Course  - Is there evidence of an acute change in mental status from the patient’s baseline? Did the (abnormal) behavior  fluctuate during the day, that is, tend to come and go, or increase and decrease in severity?  [ ] YES [x ] NO     2: Inattention  - Did the patient have difficulty focusing attention, being easily distractible, or having difficulty keeping track of what was being said?  [ ] YES [x ] NO     3: Disorganized thinking  -Was the patient’s thinking disorganized or incoherent, such as rambling or irrelevant conversation, unclear or illogical flow of ideas, or unpredictable switching from subject to subject?  [ ] YES [x ] NO    4: Altered Level of consciousness?  [ ] YES [x ] NO    The diagnosis of delirium by CAM requires the presence of features 1 and 2 and either 3 or 4.    PRESSORS: [ ] YES [x ] NO  meropenem  IVPB 1000 milliGRAM(s) IV Intermittent every 8 hours  meropenem  IVPB        Cardiovascular:  Heart Failure  Acute   Acute on Chronic  Chronic         Pulmonary:  albuterol    90 MICROgram(s) HFA Inhaler 2 Puff(s) Inhalation every 6 hours PRN  budesonide 160 MICROgram(s)/formoterol 4.5 MICROgram(s) Inhaler 2 Puff(s) Inhalation two times a day  montelukast  Chewable 5 milliGRAM(s) Oral at bedtime  roflumilast 500 MICROGram(s) Oral daily    Hematalogic:  aspirin  chewable 81 milliGRAM(s) Oral daily  heparin   Injectable 5000 Unit(s) SubCutaneous every 8 hours    Other:  chlorhexidine 2% Cloths 1 Application(s) Topical <User Schedule>  dextrose 5%. 1000 milliLiter(s) IV Continuous <Continuous>  glucagon  Injectable 1 milliGRAM(s) IntraMuscular once  insulin glargine Injectable (LANTUS) 35 Unit(s) SubCutaneous at bedtime  insulin lispro (ADMELOG) corrective regimen sliding scale   SubCutaneous at bedtime  insulin lispro (ADMELOG) corrective regimen sliding scale   SubCutaneous three times a day before meals  insulin lispro Injectable (ADMELOG) 15 Unit(s) SubCutaneous three times a day before meals  nicotine -   7 mG/24Hr(s) Patch 1 Patch Transdermal daily  OLANZapine 5 milliGRAM(s) Oral at bedtime  pantoprazole  Injectable 40 milliGRAM(s) IV Push daily  tamsulosin 0.4 milliGRAM(s) Oral at bedtime    albuterol    90 MICROgram(s) HFA Inhaler 2 Puff(s) Inhalation every 6 hours PRN  aspirin  chewable 81 milliGRAM(s) Oral daily  budesonide 160 MICROgram(s)/formoterol 4.5 MICROgram(s) Inhaler 2 Puff(s) Inhalation two times a day  chlorhexidine 2% Cloths 1 Application(s) Topical <User Schedule>  dextrose 5%. 1000 milliLiter(s) IV Continuous <Continuous>  glucagon  Injectable 1 milliGRAM(s) IntraMuscular once  heparin   Injectable 5000 Unit(s) SubCutaneous every 8 hours  insulin glargine Injectable (LANTUS) 35 Unit(s) SubCutaneous at bedtime  insulin lispro (ADMELOG) corrective regimen sliding scale   SubCutaneous three times a day before meals  insulin lispro (ADMELOG) corrective regimen sliding scale   SubCutaneous at bedtime  insulin lispro Injectable (ADMELOG) 15 Unit(s) SubCutaneous three times a day before meals  meropenem  IVPB 1000 milliGRAM(s) IV Intermittent every 8 hours  meropenem  IVPB      montelukast  Chewable 5 milliGRAM(s) Oral at bedtime  nicotine -   7 mG/24Hr(s) Patch 1 Patch Transdermal daily  OLANZapine 5 milliGRAM(s) Oral at bedtime  pantoprazole  Injectable 40 milliGRAM(s) IV Push daily  roflumilast 500 MICROGram(s) Oral daily  tamsulosin 0.4 milliGRAM(s) Oral at bedtime    Drug Dosing Weight  Height (cm): 185.4 (06 Oct 2023 18:01)  Weight (kg): 93.5 (05 Oct 2023 10:30)  BMI (kg/m2): 27.2 (06 Oct 2023 18:01)  BSA (m2): 2.18 (06 Oct 2023 18:01)    CENTRAL LINE: [ ] YES [x ] NO  LOCATION:   DATE INSERTED:  REMOVE: [ ] YES [ ] NO  EXPLAIN:    AMOR: [ ] YES [x ] NO    DATE INSERTED:  REMOVE:  [ ] YES [ ] NO  EXPLAIN:    PAST MEDICAL & SURGICAL HISTORY:  COPD (chronic obstructive pulmonary disease)  Oxygen 2-3L at home      Stented coronary artery  2017      HLD (hyperlipidemia)      Pulmonary HTN      Type 2 diabetes mellitus without complication, with long-term current use of insulin      DM (diabetes mellitus)      CAD (coronary artery disease)      GERD (gastroesophageal reflux disease)      Insomnia      CHF (congestive heart failure)      HTN (hypertension)      Mood disorder      No significant past surgical history                  10-11 @ 07:01  -  10-12 @ 07:00  --------------------------------------------------------  IN: 0 mL / OUT: 850 mL / NET: -850 mL            PHYSICAL EXAM:    GENERAL: NAD, well-groomed, well-developed  HEAD:  Atraumatic, Normocephalic  EYES: EOMI, PERRLA, conjunctiva and sclera clear  ENMT: No tonsillar erythema, exudates  NECK: Supple, No JVD  NERVOUS SYSTEM:  Alert & Oriented X3, Follows commands, moving all extremities.  CHEST/LUNG: Diminished breath sounds bilateral bases. No rale, rhonchi or wheezing  HEART: Regular rate and rhythm; No murmurs, rubs, or gallops  ABDOMEN: Soft, Obese, Nontender, Nondistended; Bowel sounds present  EXTREMITIES:  2+ Peripheral Pulses, No clubbing, cyanosis, or edema  LYMPH: No lymphadenopathy noted  SKIN: No rashes or lesions      LABS:  CBC Full  -  ( 12 Oct 2023 06:50 )  WBC Count : 10.52 K/uL  RBC Count : 4.84 M/uL  Hemoglobin : 13.4 g/dL  Hematocrit : 44.2 %  Platelet Count - Automated : 224 K/uL  Mean Cell Volume : 91.3 fl  Mean Cell Hemoglobin : 27.7 pg  Mean Cell Hemoglobin Concentration : 30.3 gm/dL  Auto Neutrophil # : x  Auto Lymphocyte # : x  Auto Monocyte # : x  Auto Eosinophil # : x  Auto Basophil # : x  Auto Neutrophil % : x  Auto Lymphocyte % : x  Auto Monocyte % : x  Auto Eosinophil % : x  Auto Basophil % : x    10-12    139  |  104  |  19<H>  ----------------------------<  132<H>  3.9   |  28  |  0.64    Ca    8.1<L>      12 Oct 2023 06:50  Phos  3.0     10-12  Mg     2.3     10-12    TPro  6.3  /  Alb  2.8<L>  /  TBili  1.0  /  DBili  x   /  AST  24  /  ALT  72<H>  /  AlkPhos  61  10-12      Urinalysis Basic - ( 12 Oct 2023 06:50 )    Color: x / Appearance: x / SG: x / pH: x  Gluc: 132 mg/dL / Ketone: x  / Bili: x / Urobili: x   Blood: x / Protein: x / Nitrite: x   Leuk Esterase: x / RBC: x / WBC x   Sq Epi: x / Non Sq Epi: x / Bacteria: x            [  ]  DVT Prophylaxis  [  ]  Nutrition, Brand, Rate         Goal Rate        Abnormal Nutritional Status -  Malnutrition   Cachexia      Morbid Obesity BMI >/=40    RADIOLOGY & ADDITIONAL STUDIES:  ***  < from: Xray Chest 1 View- PORTABLE-Urgent (Xray Chest 1 View- PORTABLE-Urgent .) (10.06.23 @ 17:41) >  INTERPRETATION:    The heart is not enlarged. The mediastinum is not accurately evaluated on   this image.  Bilateral hilar enlargement is again seen consistent with enlarged   pulmonary arteries and right hilar lymphadenopathy seen on CT of the   chest from October 5, 2023.  ET tube tip is approximately 6.7 cm above the angela.  Enteric tube tip is just beyond the GE junction and into the stomach.  There are patchy right lower lung opacities.  The included left lung is clear.  No pleural effusion or pneumothorax is seen.  No acute bony abnormality is seen.      IMPRESSION:  Bilateral hilar enlargement again seen, consistent with   enlarged pulmonary arteries, which can be seen with pulmonary artery   hypertension, and right hilar lymphadenopathy, noted on the CT scan of   the chest from October 5, 2023.    Enteric tube tip is just beyond the GE junction and into the stomach.   Recommend advancing the enteric tube. Discussed with Dr. Moore with read   back at 2:02 PM on October 7, 2023 by Dr. Sosa.    Continued patchy right lower lung opacities.    --- End of Report ---        < end of copied text >  < from: CT Angio Chest PE Protocol w/ IV Cont (10.05.23 @ 10:21) >  CONTRAST/COMPLICATIONS:  IV Contrast: Omnipaque 350 (accession [id number]), IV contrast documented   in unlinked concurrent exam (accession [id number])  90 cc administered   10   cc discarded  Oral Contrast: NONE  Complications: None reported at time of study completion      COMPARISON: 3/17/2020 CT chest.    FINDINGS:    PULMONARY ARTERIES: No pulmonary embolism. Main pulmonary artery diameter   3.8 cm.    MEDIASTINUM: Mildly enlarged mediastinal nodes; reference right   paratracheal 2.1 x 1.2 cm node (image 61, series 6). Right hilar   lymphadenopathy; reference 2.4 x 1.2 cm right hilar node (image 78,   series 6).    Qualitatively enlarged right ventricle. Trace pericardial effusion.   Thoracic aorta normal caliber.    AIRWAYS, LUNGS, PLEURA: Satisfactory positioning of endotracheal tube.   Peribronchial thickening and mucus impacted airways of the right middle   lobe and bilateral lower lobes. Peripheral consolidation in the lateral   right middle lobe and right base. Few patchy opacities at the left base.   Emphysema. No pleural effusion.    ABDOMEN and PELVIS: Subcentimeter hepatic hypodense lesions not   adequately characterized, but similar compared to 3/17/2020 CT chest. A 4   cm splenic lesion with Hounsfield units of 39 appears increased in size   compared to 3/17/2020 where it was 1.3 cm. Left adrenal 1.4 cm nodule   unchanged compared to 3/17/2020. Right renal 2 cm hypodense lesion,   likely cyst. Additional right renal subcentimeter lesions too small to   characterize.    Gallbladder, pancreas, right adrenal gland, and left kidney unremarkable.    Amor catheter within decompressed urinary bladder. Few bubbles of gas   within the urinary bladder may be related to Amor catheter insertion.   Unremarkable prostate.    No bowel obstruction.  Colonic diverticulosis. No free fluid. No   abdominal or pelvic lymphadenopathy. Abdominal aorta normal caliber.    BONES: Unremarkable.    SOFTTISSUES: Bilateral gynecomastia.    IMPRESSION:    No pulmonary embolism.    Aspiration and/or infection primarily involving the right middle lobe and   right base.    Mildly enlarged mediastinal nodes and right hilar lymphadenopathy;   indeterminate, but could be reactive. Recommend CT chest follow-up in 3   months to assess stability.    4 cm splenic lesion increased in size compared to 3/17/2020 is   indeterminate. Consider characterization with MRI abdomen.    --- End of Report ---    < end of copied text >    < from: Transthoracic Echocardiogram (10.09.23 @ 13:28) >  Ejection Fraction Visual Estimate: 50-55 %  Ejection Fraction Chairez: 54 %    ------------------------------------------------------------------------  OBSERVATIONS:  Mitral Valve: Normal mitral valve.  Aortic Root: Aortic Root: 3.8 cm.    Aortic Valve: Calcified trileaflet aortic valve with normal  opening.  Left Atrium: LA volume index = 15 cc/m2.  Left Ventricle: Normal Left Ventricular Systolic Function,  (EF = 50-55%) Endocardium not well visualized; grossly  normal left ventricular function. Normal left ventricular  internal dimensions and wall thicknesses. Grade I diastolic  dysfunction (Impaired relaxation, mild).  Right Heart: Normal right atrium. Normal right ventricular  size and systolic function. Normal tricuspid valve.  Pulmonic valve not well seen.  Pericardium/PleuraNormal pericardium with no pericardial  effusion.  Hemodynamic: Unable to estimate RVSP.  ------------------------------------------------------------------------  CONCLUSIONS:  1. Calcified trileaflet aortic valve with normal opening.  2. Normal Left Ventricular Systolic Function,  (EF =  50-55%) Endocardium not well visualized; grossly normal  left ventricular function.  3. Grade I diastolic dysfunction (Impaired relaxation,  mild).  4. Normal right atrium.  5. Normal right ventricular size and systolic function.  6. Unable to estimate RVSP.  7. Normal tricuspid valve.  8. Pulmonic valve not well seen.  9. Normal pericardium with no pericardial effusion.    < end of copied text >    Goals of Care Discussion with Family/Proxy/Other   - see note from 10/12

## 2023-10-12 NOTE — PHYSICAL THERAPY INITIAL EVALUATION ADULT - LEVEL OF INDEPENDENCE: STAND/SIT, REHAB EVAL
I will refill x 3 mo but pt needs appt    Best appt would be for complete with labs  ( CBC, lipid, CMP, PSA screen, UA)   independent

## 2023-10-13 ENCOUNTER — TRANSCRIPTION ENCOUNTER (OUTPATIENT)
Age: 63
End: 2023-10-13

## 2023-10-13 VITALS
DIASTOLIC BLOOD PRESSURE: 69 MMHG | OXYGEN SATURATION: 98 % | HEART RATE: 84 BPM | SYSTOLIC BLOOD PRESSURE: 116 MMHG | TEMPERATURE: 98 F | RESPIRATION RATE: 20 BRPM

## 2023-10-13 LAB
ALBUMIN SERPL ELPH-MCNC: 2.8 G/DL — LOW (ref 3.5–5)
ALP SERPL-CCNC: 62 U/L — SIGNIFICANT CHANGE UP (ref 40–120)
ALT FLD-CCNC: 64 U/L DA — HIGH (ref 10–60)
ANION GAP SERPL CALC-SCNC: 5 MMOL/L — SIGNIFICANT CHANGE UP (ref 5–17)
AST SERPL-CCNC: 14 U/L — SIGNIFICANT CHANGE UP (ref 10–40)
BILIRUB SERPL-MCNC: 0.7 MG/DL — SIGNIFICANT CHANGE UP (ref 0.2–1.2)
BUN SERPL-MCNC: 12 MG/DL — SIGNIFICANT CHANGE UP (ref 7–18)
CALCIUM SERPL-MCNC: 7.9 MG/DL — LOW (ref 8.4–10.5)
CHLORIDE SERPL-SCNC: 105 MMOL/L — SIGNIFICANT CHANGE UP (ref 96–108)
CO2 SERPL-SCNC: 31 MMOL/L — SIGNIFICANT CHANGE UP (ref 22–31)
CREAT SERPL-MCNC: 0.68 MG/DL — SIGNIFICANT CHANGE UP (ref 0.5–1.3)
EGFR: 106 ML/MIN/1.73M2 — SIGNIFICANT CHANGE UP
GLUCOSE BLDC GLUCOMTR-MCNC: 188 MG/DL — HIGH (ref 70–99)
GLUCOSE BLDC GLUCOMTR-MCNC: 193 MG/DL — HIGH (ref 70–99)
GLUCOSE SERPL-MCNC: 210 MG/DL — HIGH (ref 70–99)
HCT VFR BLD CALC: 43 % — SIGNIFICANT CHANGE UP (ref 39–50)
HGB BLD-MCNC: 13.3 G/DL — SIGNIFICANT CHANGE UP (ref 13–17)
MCHC RBC-ENTMCNC: 28 PG — SIGNIFICANT CHANGE UP (ref 27–34)
MCHC RBC-ENTMCNC: 30.9 GM/DL — LOW (ref 32–36)
MCV RBC AUTO: 90.5 FL — SIGNIFICANT CHANGE UP (ref 80–100)
NRBC # BLD: 0 /100 WBCS — SIGNIFICANT CHANGE UP (ref 0–0)
PHOSPHATE SERPL-MCNC: 3.6 MG/DL — SIGNIFICANT CHANGE UP (ref 2.5–4.5)
PLATELET # BLD AUTO: 195 K/UL — SIGNIFICANT CHANGE UP (ref 150–400)
POTASSIUM SERPL-MCNC: 4.1 MMOL/L — SIGNIFICANT CHANGE UP (ref 3.5–5.3)
POTASSIUM SERPL-SCNC: 4.1 MMOL/L — SIGNIFICANT CHANGE UP (ref 3.5–5.3)
PROT SERPL-MCNC: 6.3 G/DL — SIGNIFICANT CHANGE UP (ref 6–8.3)
RBC # BLD: 4.75 M/UL — SIGNIFICANT CHANGE UP (ref 4.2–5.8)
RBC # FLD: 12.7 % — SIGNIFICANT CHANGE UP (ref 10.3–14.5)
SODIUM SERPL-SCNC: 141 MMOL/L — SIGNIFICANT CHANGE UP (ref 135–145)
WBC # BLD: 10.39 K/UL — SIGNIFICANT CHANGE UP (ref 3.8–10.5)
WBC # FLD AUTO: 10.39 K/UL — SIGNIFICANT CHANGE UP (ref 3.8–10.5)

## 2023-10-13 PROCEDURE — 80048 BASIC METABOLIC PNL TOTAL CA: CPT

## 2023-10-13 PROCEDURE — 93306 TTE W/DOPPLER COMPLETE: CPT

## 2023-10-13 PROCEDURE — 83735 ASSAY OF MAGNESIUM: CPT

## 2023-10-13 PROCEDURE — 80053 COMPREHEN METABOLIC PANEL: CPT

## 2023-10-13 PROCEDURE — 74177 CT ABD & PELVIS W/CONTRAST: CPT | Mod: MA

## 2023-10-13 PROCEDURE — 94760 N-INVAS EAR/PLS OXIMETRY 1: CPT

## 2023-10-13 PROCEDURE — 84484 ASSAY OF TROPONIN QUANT: CPT

## 2023-10-13 PROCEDURE — 92610 EVALUATE SWALLOWING FUNCTION: CPT

## 2023-10-13 PROCEDURE — 84100 ASSAY OF PHOSPHORUS: CPT

## 2023-10-13 PROCEDURE — 97161 PT EVAL LOW COMPLEX 20 MIN: CPT

## 2023-10-13 PROCEDURE — 87641 MR-STAPH DNA AMP PROBE: CPT

## 2023-10-13 PROCEDURE — 94003 VENT MGMT INPAT SUBQ DAY: CPT

## 2023-10-13 PROCEDURE — 99497 ADVNCD CARE PLAN 30 MIN: CPT

## 2023-10-13 PROCEDURE — 83036 HEMOGLOBIN GLYCOSYLATED A1C: CPT

## 2023-10-13 PROCEDURE — 87186 SC STD MICRODIL/AGAR DIL: CPT

## 2023-10-13 PROCEDURE — 82550 ASSAY OF CK (CPK): CPT

## 2023-10-13 PROCEDURE — 82803 BLOOD GASES ANY COMBINATION: CPT

## 2023-10-13 PROCEDURE — 85610 PROTHROMBIN TIME: CPT

## 2023-10-13 PROCEDURE — 87077 CULTURE AEROBIC IDENTIFY: CPT

## 2023-10-13 PROCEDURE — 82962 GLUCOSE BLOOD TEST: CPT

## 2023-10-13 PROCEDURE — 82330 ASSAY OF CALCIUM: CPT

## 2023-10-13 PROCEDURE — 83605 ASSAY OF LACTIC ACID: CPT

## 2023-10-13 PROCEDURE — 84132 ASSAY OF SERUM POTASSIUM: CPT

## 2023-10-13 PROCEDURE — 80061 LIPID PANEL: CPT

## 2023-10-13 PROCEDURE — 81001 URINALYSIS AUTO W/SCOPE: CPT

## 2023-10-13 PROCEDURE — 87635 SARS-COV-2 COVID-19 AMP PRB: CPT

## 2023-10-13 PROCEDURE — 85379 FIBRIN DEGRADATION QUANT: CPT

## 2023-10-13 PROCEDURE — 85730 THROMBOPLASTIN TIME PARTIAL: CPT

## 2023-10-13 PROCEDURE — 71045 X-RAY EXAM CHEST 1 VIEW: CPT

## 2023-10-13 PROCEDURE — 70450 CT HEAD/BRAIN W/O DYE: CPT | Mod: MA

## 2023-10-13 PROCEDURE — 84295 ASSAY OF SERUM SODIUM: CPT

## 2023-10-13 PROCEDURE — 86703 HIV-1/HIV-2 1 RESULT ANTBDY: CPT

## 2023-10-13 PROCEDURE — 87040 BLOOD CULTURE FOR BACTERIA: CPT

## 2023-10-13 PROCEDURE — 85025 COMPLETE CBC W/AUTO DIFF WBC: CPT

## 2023-10-13 PROCEDURE — 85027 COMPLETE CBC AUTOMATED: CPT

## 2023-10-13 PROCEDURE — 71275 CT ANGIOGRAPHY CHEST: CPT | Mod: MA

## 2023-10-13 PROCEDURE — 93005 ELECTROCARDIOGRAM TRACING: CPT

## 2023-10-13 PROCEDURE — 83880 ASSAY OF NATRIURETIC PEPTIDE: CPT

## 2023-10-13 PROCEDURE — 87086 URINE CULTURE/COLONY COUNT: CPT

## 2023-10-13 PROCEDURE — 94640 AIRWAY INHALATION TREATMENT: CPT

## 2023-10-13 PROCEDURE — 80307 DRUG TEST PRSMV CHEM ANLYZR: CPT

## 2023-10-13 PROCEDURE — 82553 CREATINE MB FRACTION: CPT

## 2023-10-13 PROCEDURE — 94002 VENT MGMT INPAT INIT DAY: CPT

## 2023-10-13 PROCEDURE — 99285 EMERGENCY DEPT VISIT HI MDM: CPT | Mod: 25

## 2023-10-13 PROCEDURE — 96375 TX/PRO/DX INJ NEW DRUG ADDON: CPT

## 2023-10-13 PROCEDURE — 36415 COLL VENOUS BLD VENIPUNCTURE: CPT

## 2023-10-13 PROCEDURE — 94660 CPAP INITIATION&MGMT: CPT

## 2023-10-13 PROCEDURE — 96374 THER/PROPH/DIAG INJ IV PUSH: CPT

## 2023-10-13 PROCEDURE — 87640 STAPH A DNA AMP PROBE: CPT

## 2023-10-13 RX ORDER — INSULIN GLARGINE 100 [IU]/ML
35 INJECTION, SOLUTION SUBCUTANEOUS
Qty: 0 | Refills: 0 | DISCHARGE
Start: 2023-10-13

## 2023-10-13 RX ORDER — INSULIN LISPRO 100/ML
15 VIAL (ML) SUBCUTANEOUS
Qty: 0 | Refills: 0 | DISCHARGE
Start: 2023-10-13

## 2023-10-13 RX ORDER — NALOXONE HYDROCHLORIDE 4 MG/.1ML
4 SPRAY NASAL
Qty: 1 | Refills: 0
Start: 2023-10-13

## 2023-10-13 RX ORDER — INSULIN NPH HUM/REG INSULIN HM 70-30/ML
30 VIAL (ML) SUBCUTANEOUS
Refills: 0 | DISCHARGE

## 2023-10-13 RX ORDER — SOD,AMMONIUM,POTASSIUM LACTATE
1 CREAM (GRAM) TOPICAL
Refills: 0 | DISCHARGE

## 2023-10-13 RX ORDER — INSULIN LISPRO 100/ML
1 VIAL (ML) SUBCUTANEOUS
Refills: 0
Start: 2023-10-13

## 2023-10-13 RX ORDER — INSULIN NPH HUM/REG INSULIN HM 70-30/ML
0 VIAL (ML) SUBCUTANEOUS
Refills: 0 | DISCHARGE

## 2023-10-13 RX ORDER — INSULIN LISPRO 100/ML
1 VIAL (ML) SUBCUTANEOUS
Qty: 1 | Refills: 0
Start: 2023-10-13 | End: 2023-11-11

## 2023-10-13 RX ADMIN — HEPARIN SODIUM 5000 UNIT(S): 5000 INJECTION INTRAVENOUS; SUBCUTANEOUS at 13:33

## 2023-10-13 RX ADMIN — Medication 1 PATCH: at 07:47

## 2023-10-13 RX ADMIN — Medication 15 UNIT(S): at 12:00

## 2023-10-13 RX ADMIN — Medication 2: at 12:01

## 2023-10-13 RX ADMIN — Medication 2: at 08:33

## 2023-10-13 RX ADMIN — CHLORHEXIDINE GLUCONATE 1 APPLICATION(S): 213 SOLUTION TOPICAL at 06:26

## 2023-10-13 RX ADMIN — HEPARIN SODIUM 5000 UNIT(S): 5000 INJECTION INTRAVENOUS; SUBCUTANEOUS at 06:18

## 2023-10-13 RX ADMIN — Medication 15 UNIT(S): at 08:32

## 2023-10-13 RX ADMIN — Medication 1 PATCH: at 11:55

## 2023-10-13 RX ADMIN — ROFLUMILAST 500 MICROGRAM(S): 500 TABLET ORAL at 12:03

## 2023-10-13 RX ADMIN — BUDESONIDE AND FORMOTEROL FUMARATE DIHYDRATE 2 PUFF(S): 160; 4.5 AEROSOL RESPIRATORY (INHALATION) at 09:51

## 2023-10-13 RX ADMIN — Medication 81 MILLIGRAM(S): at 12:03

## 2023-10-13 RX ADMIN — PANTOPRAZOLE SODIUM 40 MILLIGRAM(S): 20 TABLET, DELAYED RELEASE ORAL at 06:26

## 2023-10-13 RX ADMIN — Medication 1 PATCH: at 12:02

## 2023-10-13 NOTE — PROGRESS NOTE ADULT - SUBJECTIVE AND OBJECTIVE BOX
DEVIKA CARBALLO    SCU progress note    INTERVAL HPI/OVERNIGHT EVENTS: ***No overnight events. Used BIPAP for part of night.    DNR [ ]   DNI  [  ]   Full Code    Covid - 19 PCR:     The 4Ms    What Matters Most: see GOC  Age appropriate Medications/Screen for High Risk Medication: Yes  Mentation: see CAM below  Mobility: defer to physical exam    The Confusion Assessment Method (CAM) Diagnostic Algorithm     1: Acute Onset or Fluctuating Course  - Is there evidence of an acute change in mental status from the patient’s baseline? Did the (abnormal) behavior  fluctuate during the day, that is, tend to come and go, or increase and decrease in severity?  [ ] YES [x ] NO     2: Inattention  - Did the patient have difficulty focusing attention, being easily distractible, or having difficulty keeping track of what was being said?  [ ] YES [x ] NO     3: Disorganized thinking  -Was the patient’s thinking disorganized or incoherent, such as rambling or irrelevant conversation, unclear or illogical flow of ideas, or unpredictable switching from subject to subject?  [ ] YES [x ] NO    4: Altered Level of consciousness?  [ ] YES [x ] NO    The diagnosis of delirium by CAM requires the presence of features 1 and 2 and either 3 or 4.    PRESSORS: [ ] YES [x ] NO    Cardiovascular:  Heart Failure  Acute   Acute on Chronic  Chronic         Pulmonary:  albuterol    90 MICROgram(s) HFA Inhaler 2 Puff(s) Inhalation every 6 hours PRN  budesonide 160 MICROgram(s)/formoterol 4.5 MICROgram(s) Inhaler 2 Puff(s) Inhalation two times a day  montelukast  Chewable 5 milliGRAM(s) Oral at bedtime  roflumilast 500 MICROGram(s) Oral daily    Hematalogic:  aspirin  chewable 81 milliGRAM(s) Oral daily  heparin   Injectable 5000 Unit(s) SubCutaneous every 8 hours    Other:  chlorhexidine 2% Cloths 1 Application(s) Topical <User Schedule>  dextrose 5%. 1000 milliLiter(s) IV Continuous <Continuous>  glucagon  Injectable 1 milliGRAM(s) IntraMuscular once  insulin glargine Injectable (LANTUS) 35 Unit(s) SubCutaneous at bedtime  insulin lispro (ADMELOG) corrective regimen sliding scale   SubCutaneous three times a day before meals  insulin lispro (ADMELOG) corrective regimen sliding scale   SubCutaneous at bedtime  insulin lispro Injectable (ADMELOG) 15 Unit(s) SubCutaneous three times a day before meals  nicotine -   7 mG/24Hr(s) Patch 1 Patch Transdermal daily  OLANZapine 5 milliGRAM(s) Oral at bedtime  oxycodone    5 mG/acetaminophen 325 mG 1 Tablet(s) Oral every 8 hours PRN  pantoprazole    Tablet 40 milliGRAM(s) Oral before breakfast  tamsulosin 0.4 milliGRAM(s) Oral at bedtime    albuterol    90 MICROgram(s) HFA Inhaler 2 Puff(s) Inhalation every 6 hours PRN  aspirin  chewable 81 milliGRAM(s) Oral daily  budesonide 160 MICROgram(s)/formoterol 4.5 MICROgram(s) Inhaler 2 Puff(s) Inhalation two times a day  chlorhexidine 2% Cloths 1 Application(s) Topical <User Schedule>  dextrose 5%. 1000 milliLiter(s) IV Continuous <Continuous>  glucagon  Injectable 1 milliGRAM(s) IntraMuscular once  heparin   Injectable 5000 Unit(s) SubCutaneous every 8 hours  insulin glargine Injectable (LANTUS) 35 Unit(s) SubCutaneous at bedtime  insulin lispro (ADMELOG) corrective regimen sliding scale   SubCutaneous three times a day before meals  insulin lispro (ADMELOG) corrective regimen sliding scale   SubCutaneous at bedtime  insulin lispro Injectable (ADMELOG) 15 Unit(s) SubCutaneous three times a day before meals  montelukast  Chewable 5 milliGRAM(s) Oral at bedtime  nicotine -   7 mG/24Hr(s) Patch 1 Patch Transdermal daily  OLANZapine 5 milliGRAM(s) Oral at bedtime  oxycodone    5 mG/acetaminophen 325 mG 1 Tablet(s) Oral every 8 hours PRN  pantoprazole    Tablet 40 milliGRAM(s) Oral before breakfast  roflumilast 500 MICROGram(s) Oral daily  tamsulosin 0.4 milliGRAM(s) Oral at bedtime    Drug Dosing Weight  Height (cm): 185.4 (06 Oct 2023 18:01)  Weight (kg): 93.5 (05 Oct 2023 10:30)  BMI (kg/m2): 27.2 (06 Oct 2023 18:01)  BSA (m2): 2.18 (06 Oct 2023 18:01)    CENTRAL LINE: [ ] YES [x ] NO  LOCATION:   DATE INSERTED:  REMOVE: [ ] YES [ ] NO  EXPLAIN:    AMOR: [ ] YES [x ] NO    DATE INSERTED:  REMOVE:  [ ] YES [ ] NO  EXPLAIN:    PAST MEDICAL & SURGICAL HISTORY:  COPD (chronic obstructive pulmonary disease)  Oxygen 2-3L at home      Stented coronary artery  2017      HLD (hyperlipidemia)      Pulmonary HTN      Type 2 diabetes mellitus without complication, with long-term current use of insulin      DM (diabetes mellitus)      CAD (coronary artery disease)      GERD (gastroesophageal reflux disease)      Insomnia      CHF (congestive heart failure)      HTN (hypertension)      Mood disorder      No significant past surgical history                  10-12 @ 07:01  -  10-13 @ 07:00  --------------------------------------------------------  IN: 0 mL / OUT: 1600 mL / NET: -1600 mL            PHYSICAL EXAM:    GENERAL: NAD, well-groomed, well-developed  HEAD:  Atraumatic, Normocephalic  EYES: EOMI, PERRLA, conjunctiva and sclera clear  ENMT: No tonsillar erythema, exudates  NECK: Supple, No JVD  NERVOUS SYSTEM:  Alert & Oriented X3, follows commands, moving all extremities  CHEST/LUNG: Diminished breath sounds bilateral bases L>R.  Few rhonchi left base noted.     HEART: Regular rate and rhythm; No murmurs, rubs, or gallops  ABDOMEN: Soft, Nontender, Nondistended; Bowel sounds present  EXTREMITIES:  2+ Peripheral Pulses, No clubbing, cyanosis, or edema  LYMPH: No lymphadenopathy noted  SKIN: Bilateral arms ecchymotic      LABS:  CBC Full  -  ( 13 Oct 2023 06:20 )  WBC Count : 10.39 K/uL  RBC Count : 4.75 M/uL  Hemoglobin : 13.3 g/dL  Hematocrit : 43.0 %  Platelet Count - Automated : 195 K/uL  Mean Cell Volume : 90.5 fl  Mean Cell Hemoglobin : 28.0 pg  Mean Cell Hemoglobin Concentration : 30.9 gm/dL  Auto Neutrophil # : x  Auto Lymphocyte # : x  Auto Monocyte # : x  Auto Eosinophil # : x  Auto Basophil # : x  Auto Neutrophil % : x  Auto Lymphocyte % : x  Auto Monocyte % : x  Auto Eosinophil % : x  Auto Basophil % : x    10-13    141  |  105  |  12  ----------------------------<  210<H>  4.1   |  31  |  0.68    Ca    7.9<L>      13 Oct 2023 06:20  Phos  3.6     10-13  Mg     2.3     10-12    TPro  6.3  /  Alb  2.8<L>  /  TBili  0.7  /  DBili  x   /  AST  14  /  ALT  64<H>  /  AlkPhos  62  10-13      Urinalysis Basic - ( 13 Oct 2023 06:20 )    Color: x / Appearance: x / SG: x / pH: x  Gluc: 210 mg/dL / Ketone: x  / Bili: x / Urobili: x   Blood: x / Protein: x / Nitrite: x   Leuk Esterase: x / RBC: x / WBC x   Sq Epi: x / Non Sq Epi: x / Bacteria: x            [  ]  DVT Prophylaxis  [  ]  Nutrition, Brand, Rate         Goal Rate        Abnormal Nutritional Status -  Malnutrition   Cachexia          RADIOLOGY & ADDITIONAL STUDIES:  ***  < from: Xray Chest 1 View- PORTABLE-Urgent (Xray Chest 1 View- PORTABLE-Urgent .) (10.06.23 @ 17:41) >  INTERPRETATION:    The heart is not enlarged. The mediastinum is not accurately evaluated on   this image.  Bilateral hilar enlargement is again seen consistent with enlarged   pulmonary arteries and right hilar lymphadenopathy seen on CT of the   chest from October 5, 2023.  ET tube tip is approximately 6.7 cm above the angela.  Enteric tube tip is just beyond the GE junction and into the stomach.  There are patchy right lower lung opacities.  The included left lung is clear.  No pleural effusion or pneumothorax is seen.  No acute bony abnormality is seen.      IMPRESSION:  Bilateral hilar enlargement again seen, consistent with   enlarged pulmonary arteries, which can be seen with pulmonary artery   hypertension, and right hilar lymphadenopathy, noted on the CT scan of   the chest from October 5, 2023.    Enteric tube tip is just beyond the GE junction and into the stomach.   Recommend advancing the enteric tube. Discussed with Dr. Moore with read   back at 2:02 PM on October 7, 2023 by Dr. Sosa.    Continued patchy right lower lung opacities.    --- End of Report ---        < end of copied text >  < from: CT Angio Chest PE Protocol w/ IV Cont (10.05.23 @ 10:21) >  INTERPRETATION:  CTA CHEST AND CT ABDOMEN AND PELVIS    INDICATION: Admitting Dxs: J96.01 ACUTE RESPIRATORY FAILURE WITH HYPOXIA.   Shortness of breath. Tachycardia. Rule out pulmonary embolism.    TECHNIQUE: Enhanced helical images were obtained of the chest, abdomen   and pelvis. Coronal and sagittalimages were reconstructed. Maximum   intensity projection images were generated.    CONTRAST/COMPLICATIONS:  IV Contrast: Omnipaque 350 (accession [id number]), IV contrast documented   in unlinked concurrent exam (accession [id number])  90 cc administered   10   cc discarded  Oral Contrast: NONE  Complications: None reported at time of study completion      COMPARISON: 3/17/2020 CT chest.    FINDINGS:    PULMONARY ARTERIES: No pulmonary embolism. Main pulmonary artery diameter   3.8 cm.    MEDIASTINUM: Mildly enlarged mediastinal nodes; reference right   paratracheal 2.1 x 1.2 cm node (image 61, series 6). Right hilar   lymphadenopathy; reference 2.4 x 1.2 cm right hilar node (image 78,   series 6).    Qualitatively enlarged right ventricle. Trace pericardial effusion.   Thoracic aorta normal caliber.    AIRWAYS, LUNGS, PLEURA: Satisfactory positioning of endotracheal tube.   Peribronchial thickening and mucus impacted airways of the right middle   lobe and bilateral lower lobes. Peripheral consolidation in the lateral   right middle lobe and right base. Few patchy opacities at the left base.   Emphysema. No pleural effusion.    ABDOMEN and PELVIS: Subcentimeter hepatic hypodense lesions not   adequately characterized, but similar compared to 3/17/2020 CT chest. A 4   cm splenic lesion with Hounsfield units of 39 appears increased in size   compared to 3/17/2020 where it was 1.3 cm. Left adrenal 1.4 cm nodule   unchanged compared to 3/17/2020. Right renal 2 cm hypodense lesion,   likely cyst. Additional right renal subcentimeter lesions too small to   characterize.    Gallbladder, pancreas, right adrenal gland, and left kidney unremarkable.    Amor catheter within decompressed urinary bladder. Few bubbles of gas   within the urinary bladder may be related to Amor catheter insertion.   Unremarkable prostate.    No bowel obstruction.  Colonic diverticulosis. No free fluid. No   abdominal or pelvic lymphadenopathy. Abdominal aorta normal caliber.    BONES: Unremarkable.    SOFTTISSUES: Bilateral gynecomastia.    IMPRESSION:    No pulmonary embolism.    Aspiration and/or infection primarily involving the right middle lobe and   right base.    Mildly enlarged mediastinal nodes and right hilar lymphadenopathy;   indeterminate, but could be reactive. Recommend CT chest follow-up in 3   months to assess stability.    4 cm splenic lesion increased in size compared to 3/17/2020 is   indeterminate. Consider characterization with MRI abdomen.    --- End of Report ---    < end of copied text >  < from: CT Abdomen and Pelvis w/ IV Cont (10.05.23 @ 10:21) >  ACC: [id number] EXAM:  CT ABDOMEN AND PELVIS IC   ORDERED BY: TERE DAIGLE     ACC: [id number] EXAM:  CT ANGIO CHEST PULM ART WAWIC   ORDERED BY: CHRISTIANO SEAMAN     PROCEDURE DATE:  10/05/2023          INTERPRETATION:  CTA CHEST AND CT ABDOMEN AND PELVIS    INDICATION: Admitting Dxs: J96.01 ACUTE RESPIRATORY FAILURE WITH HYPOXIA.   Shortness of breath. Tachycardia. Rule out pulmonary embolism.    TECHNIQUE: Enhanced helical images were obtained of the chest, abdomen   and pelvis. Coronal and sagittalimages were reconstructed. Maximum   intensity projection images were generated.    CONTRAST/COMPLICATIONS:  IV Contrast: Omnipaque 350 (accession [id number]), IV contrast documented   in unlinked concurrent exam (accession [id number])  90 cc administered   10   cc discarded  Oral Contrast: NONE  Complications: None reported at time of study completion      COMPARISON: 3/17/2020 CT chest.    FINDINGS:    PULMONARY ARTERIES: No pulmonary embolism. Main pulmonary artery diameter   3.8 cm.    MEDIASTINUM: Mildly enlarged mediastinal nodes; reference right   paratracheal 2.1 x 1.2 cm node (image 61, series 6). Right hilar   lymphadenopathy; reference 2.4 x 1.2 cm right hilar node (image 78,   series 6).    Qualitatively enlarged right ventricle. Trace pericardial effusion.   Thoracic aorta normal caliber.    AIRWAYS, LUNGS, PLEURA: Satisfactory positioning of endotracheal tube.   Peribronchial thickening and mucus impacted airways of the right middle   lobe and bilateral lower lobes. Peripheral consolidation in the lateral   right middle lobe and right base. Few patchy opacities at the left base.   Emphysema. No pleural effusion.    ABDOMEN and PELVIS: Subcentimeter hepatic hypodense lesions not   adequately characterized, but similar compared to 3/17/2020 CT chest. A 4   cm splenic lesion with Hounsfield units of 39 appears increased in size   compared to 3/17/2020 where it was 1.3 cm. Left adrenal 1.4 cm nodule   unchanged compared to 3/17/2020. Right renal 2 cm hypodense lesion,   likely cyst. Additional right renal subcentimeter lesions too small to   characterize.    Gallbladder, pancreas, right adrenal gland, and left kidney unremarkable.    Amor catheter within decompressed urinary bladder. Few bubbles of gas   within the urinary bladder may be related to Amor catheter insertion.   Unremarkable prostate.    No bowel obstruction.  Colonic diverticulosis. No free fluid. No   abdominal or pelvic lymphadenopathy. Abdominal aorta normal caliber.    BONES: Unremarkable.    SOFTTISSUES: Bilateral gynecomastia.    IMPRESSION:    No pulmonary embolism.    Aspiration and/or infection primarily involving the right middle lobe and   right base.    Mildly enlarged mediastinal nodes and right hilar lymphadenopathy;   indeterminate, but could be reactive. Recommend CT chest follow-up in 3   months to assess stability.    4 cm splenic lesion increased in size compared to 3/17/2020 is   indeterminate. Consider characterization with MRI abdomen.    --- End of Report ---        < end of copied text >  < from: CT Head No Cont (10.05.23 @ 10:20) >  FINDINGS:  There is no acute intracranial hemorrhage, parenchymal mass, mass effect   or midline shift. There is no acute territorial infarct. There is no   hydrocephalus.    The cranium is intact.    Mild mucosal thickening paranasal sinuses. Opacification mastoid air   cells and middle ear cavities. Partially visualized tube noted right oral   cavity. Prominent adenoids noted.        IMPRESSION:  No acute intracranial hemorrhage or acute territorial infarct.  If   symptoms persist, follow-up MRI exam recommended.    --- End of Report ---      < end of copied text >    Goals of Care Discussion with Family/Proxy/Other   - see note from 10/14     DEVIKA CARBALLO    SCU progress note    INTERVAL HPI/OVERNIGHT EVENTS: ***No overnight events. Used BIPAP for part of night.    DNR [ ]   DNI  [  ]   Full Code    Covid - 19 PCR:     The 4Ms    What Matters Most: see GOC  Age appropriate Medications/Screen for High Risk Medication: Yes  Mentation: see CAM below  Mobility: defer to physical exam    The Confusion Assessment Method (CAM) Diagnostic Algorithm     1: Acute Onset or Fluctuating Course  - Is there evidence of an acute change in mental status from the patient’s baseline? Did the (abnormal) behavior  fluctuate during the day, that is, tend to come and go, or increase and decrease in severity?  [ ] YES [x ] NO     2: Inattention  - Did the patient have difficulty focusing attention, being easily distractible, or having difficulty keeping track of what was being said?  [ ] YES [x ] NO     3: Disorganized thinking  -Was the patient’s thinking disorganized or incoherent, such as rambling or irrelevant conversation, unclear or illogical flow of ideas, or unpredictable switching from subject to subject?  [ ] YES [x ] NO    4: Altered Level of consciousness?  [ ] YES [x ] NO    The diagnosis of delirium by CAM requires the presence of features 1 and 2 and either 3 or 4.    PRESSORS: [ ] YES [x ] NO    Cardiovascular:  Heart Failure  Acute   Acute on Chronic  Chronic         Pulmonary:  albuterol    90 MICROgram(s) HFA Inhaler 2 Puff(s) Inhalation every 6 hours PRN  budesonide 160 MICROgram(s)/formoterol 4.5 MICROgram(s) Inhaler 2 Puff(s) Inhalation two times a day  montelukast  Chewable 5 milliGRAM(s) Oral at bedtime  roflumilast 500 MICROGram(s) Oral daily    Hematalogic:  aspirin  chewable 81 milliGRAM(s) Oral daily  heparin   Injectable 5000 Unit(s) SubCutaneous every 8 hours    Other:  chlorhexidine 2% Cloths 1 Application(s) Topical <User Schedule>  dextrose 5%. 1000 milliLiter(s) IV Continuous <Continuous>  glucagon  Injectable 1 milliGRAM(s) IntraMuscular once  insulin glargine Injectable (LANTUS) 35 Unit(s) SubCutaneous at bedtime  insulin lispro (ADMELOG) corrective regimen sliding scale   SubCutaneous three times a day before meals  insulin lispro (ADMELOG) corrective regimen sliding scale   SubCutaneous at bedtime  insulin lispro Injectable (ADMELOG) 15 Unit(s) SubCutaneous three times a day before meals  nicotine -   7 mG/24Hr(s) Patch 1 Patch Transdermal daily  OLANZapine 5 milliGRAM(s) Oral at bedtime  oxycodone    5 mG/acetaminophen 325 mG 1 Tablet(s) Oral every 8 hours PRN  pantoprazole    Tablet 40 milliGRAM(s) Oral before breakfast  tamsulosin 0.4 milliGRAM(s) Oral at bedtime    albuterol    90 MICROgram(s) HFA Inhaler 2 Puff(s) Inhalation every 6 hours PRN  aspirin  chewable 81 milliGRAM(s) Oral daily  budesonide 160 MICROgram(s)/formoterol 4.5 MICROgram(s) Inhaler 2 Puff(s) Inhalation two times a day  chlorhexidine 2% Cloths 1 Application(s) Topical <User Schedule>  dextrose 5%. 1000 milliLiter(s) IV Continuous <Continuous>  glucagon  Injectable 1 milliGRAM(s) IntraMuscular once  heparin   Injectable 5000 Unit(s) SubCutaneous every 8 hours  insulin glargine Injectable (LANTUS) 35 Unit(s) SubCutaneous at bedtime  insulin lispro (ADMELOG) corrective regimen sliding scale   SubCutaneous three times a day before meals  insulin lispro (ADMELOG) corrective regimen sliding scale   SubCutaneous at bedtime  insulin lispro Injectable (ADMELOG) 15 Unit(s) SubCutaneous three times a day before meals  montelukast  Chewable 5 milliGRAM(s) Oral at bedtime  nicotine -   7 mG/24Hr(s) Patch 1 Patch Transdermal daily  OLANZapine 5 milliGRAM(s) Oral at bedtime  oxycodone    5 mG/acetaminophen 325 mG 1 Tablet(s) Oral every 8 hours PRN  pantoprazole    Tablet 40 milliGRAM(s) Oral before breakfast  roflumilast 500 MICROGram(s) Oral daily  tamsulosin 0.4 milliGRAM(s) Oral at bedtime    Drug Dosing Weight  Height (cm): 185.4 (06 Oct 2023 18:01)  Weight (kg): 93.5 (05 Oct 2023 10:30)  BMI (kg/m2): 27.2 (06 Oct 2023 18:01)  BSA (m2): 2.18 (06 Oct 2023 18:01)    CENTRAL LINE: [ ] YES [x ] NO  LOCATION:   DATE INSERTED:  REMOVE: [ ] YES [ ] NO  EXPLAIN:    AMOR: [ ] YES [x ] NO    DATE INSERTED:  REMOVE:  [ ] YES [ ] NO  EXPLAIN:    PAST MEDICAL & SURGICAL HISTORY:  COPD (chronic obstructive pulmonary disease)  Oxygen 2-3L at home      Stented coronary artery  2017      HLD (hyperlipidemia)      Pulmonary HTN      Type 2 diabetes mellitus without complication, with long-term current use of insulin      DM (diabetes mellitus)      CAD (coronary artery disease)      GERD (gastroesophageal reflux disease)      Insomnia      CHF (congestive heart failure)      HTN (hypertension)      Mood disorder      No significant past surgical history                  10-12 @ 07:01  -  10-13 @ 07:00  --------------------------------------------------------  IN: 0 mL / OUT: 1600 mL / NET: -1600 mL            PHYSICAL EXAM:    GENERAL: NAD, well-groomed, well-developed  HEAD:  Atraumatic, Normocephalic  EYES: EOMI, PERRLA, conjunctiva and sclera clear  ENMT: No tonsillar erythema, exudates  NECK: Supple, No JVD  NERVOUS SYSTEM:  Alert & Oriented X3, follows commands, moving all extremities  CHEST/LUNG: Diminished breath sounds bilateral bases L>R.  Few rhonchi left base noted.     HEART: Regular rate and rhythm; No murmurs, rubs, or gallops  ABDOMEN: Soft, Nontender, Nondistended; Bowel sounds present  EXTREMITIES:  2+ Peripheral Pulses, No clubbing, cyanosis, or edema  LYMPH: No lymphadenopathy noted  SKIN: Bilateral arms ecchymotic      LABS:  CBC Full  -  ( 13 Oct 2023 06:20 )  WBC Count : 10.39 K/uL  RBC Count : 4.75 M/uL  Hemoglobin : 13.3 g/dL  Hematocrit : 43.0 %  Platelet Count - Automated : 195 K/uL  Mean Cell Volume : 90.5 fl  Mean Cell Hemoglobin : 28.0 pg  Mean Cell Hemoglobin Concentration : 30.9 gm/dL  Auto Neutrophil # : x  Auto Lymphocyte # : x  Auto Monocyte # : x  Auto Eosinophil # : x  Auto Basophil # : x  Auto Neutrophil % : x  Auto Lymphocyte % : x  Auto Monocyte % : x  Auto Eosinophil % : x  Auto Basophil % : x    10-13    141  |  105  |  12  ----------------------------<  210<H>  4.1   |  31  |  0.68    Ca    7.9<L>      13 Oct 2023 06:20  Phos  3.6     10-13  Mg     2.3     10-12    TPro  6.3  /  Alb  2.8<L>  /  TBili  0.7  /  DBili  x   /  AST  14  /  ALT  64<H>  /  AlkPhos  62  10-13      Urinalysis Basic - ( 13 Oct 2023 06:20 )    Color: x / Appearance: x / SG: x / pH: x  Gluc: 210 mg/dL / Ketone: x  / Bili: x / Urobili: x   Blood: x / Protein: x / Nitrite: x   Leuk Esterase: x / RBC: x / WBC x   Sq Epi: x / Non Sq Epi: x / Bacteria: x            [  ]  DVT Prophylaxis  [  ]  Nutrition, Brand, Rate         Goal Rate        Abnormal Nutritional Status -  Malnutrition   Cachexia          RADIOLOGY & ADDITIONAL STUDIES:  ***  < from: Xray Chest 1 View- PORTABLE-Urgent (Xray Chest 1 View- PORTABLE-Urgent .) (10.06.23 @ 17:41) >  INTERPRETATION:    The heart is not enlarged. The mediastinum is not accurately evaluated on   this image.  Bilateral hilar enlargement is again seen consistent with enlarged   pulmonary arteries and right hilar lymphadenopathy seen on CT of the   chest from October 5, 2023.  ET tube tip is approximately 6.7 cm above the angela.  Enteric tube tip is just beyond the GE junction and into the stomach.  There are patchy right lower lung opacities.  The included left lung is clear.  No pleural effusion or pneumothorax is seen.  No acute bony abnormality is seen.      IMPRESSION:  Bilateral hilar enlargement again seen, consistent with   enlarged pulmonary arteries, which can be seen with pulmonary artery   hypertension, and right hilar lymphadenopathy, noted on the CT scan of   the chest from October 5, 2023.    Enteric tube tip is just beyond the GE junction and into the stomach.   Recommend advancing the enteric tube. Discussed with Dr. Moore with read   back at 2:02 PM on October 7, 2023 by Dr. Sosa.    Continued patchy right lower lung opacities.    --- End of Report ---        < end of copied text >  < from: CT Angio Chest PE Protocol w/ IV Cont (10.05.23 @ 10:21) >  INTERPRETATION:  CTA CHEST AND CT ABDOMEN AND PELVIS    INDICATION: Admitting Dxs: J96.01 ACUTE RESPIRATORY FAILURE WITH HYPOXIA.   Shortness of breath. Tachycardia. Rule out pulmonary embolism.    TECHNIQUE: Enhanced helical images were obtained of the chest, abdomen   and pelvis. Coronal and sagittalimages were reconstructed. Maximum   intensity projection images were generated.    CONTRAST/COMPLICATIONS:  IV Contrast: Omnipaque 350 (accession [id number]), IV contrast documented   in unlinked concurrent exam (accession [id number])  90 cc administered   10   cc discarded  Oral Contrast: NONE  Complications: None reported at time of study completion      COMPARISON: 3/17/2020 CT chest.    FINDINGS:    PULMONARY ARTERIES: No pulmonary embolism. Main pulmonary artery diameter   3.8 cm.    MEDIASTINUM: Mildly enlarged mediastinal nodes; reference right   paratracheal 2.1 x 1.2 cm node (image 61, series 6). Right hilar   lymphadenopathy; reference 2.4 x 1.2 cm right hilar node (image 78,   series 6).    Qualitatively enlarged right ventricle. Trace pericardial effusion.   Thoracic aorta normal caliber.    AIRWAYS, LUNGS, PLEURA: Satisfactory positioning of endotracheal tube.   Peribronchial thickening and mucus impacted airways of the right middle   lobe and bilateral lower lobes. Peripheral consolidation in the lateral   right middle lobe and right base. Few patchy opacities at the left base.   Emphysema. No pleural effusion.    ABDOMEN and PELVIS: Subcentimeter hepatic hypodense lesions not   adequately characterized, but similar compared to 3/17/2020 CT chest. A 4   cm splenic lesion with Hounsfield units of 39 appears increased in size   compared to 3/17/2020 where it was 1.3 cm. Left adrenal 1.4 cm nodule   unchanged compared to 3/17/2020. Right renal 2 cm hypodense lesion,   likely cyst. Additional right renal subcentimeter lesions too small to   characterize.    Gallbladder, pancreas, right adrenal gland, and left kidney unremarkable.    Amor catheter within decompressed urinary bladder. Few bubbles of gas   within the urinary bladder may be related to Amor catheter insertion.   Unremarkable prostate.    No bowel obstruction.  Colonic diverticulosis. No free fluid. No   abdominal or pelvic lymphadenopathy. Abdominal aorta normal caliber.    BONES: Unremarkable.    SOFTTISSUES: Bilateral gynecomastia.    IMPRESSION:    No pulmonary embolism.    Aspiration and/or infection primarily involving the right middle lobe and   right base.    Mildly enlarged mediastinal nodes and right hilar lymphadenopathy;   indeterminate, but could be reactive. Recommend CT chest follow-up in 3   months to assess stability.    4 cm splenic lesion increased in size compared to 3/17/2020 is   indeterminate. Consider characterization with MRI abdomen.    --- End of Report ---    < end of copied text >  < from: CT Abdomen and Pelvis w/ IV Cont (10.05.23 @ 10:21) >  ACC: [id number] EXAM:  CT ABDOMEN AND PELVIS IC   ORDERED BY: TERE DAIGLE     ACC: [id number] EXAM:  CT ANGIO CHEST PULM ART WAWIC   ORDERED BY: CHRISTIANO SEAMAN     PROCEDURE DATE:  10/05/2023          INTERPRETATION:  CTA CHEST AND CT ABDOMEN AND PELVIS    INDICATION: Admitting Dxs: J96.01 ACUTE RESPIRATORY FAILURE WITH HYPOXIA.   Shortness of breath. Tachycardia. Rule out pulmonary embolism.    TECHNIQUE: Enhanced helical images were obtained of the chest, abdomen   and pelvis. Coronal and sagittalimages were reconstructed. Maximum   intensity projection images were generated.    CONTRAST/COMPLICATIONS:  IV Contrast: Omnipaque 350 (accession [id number]), IV contrast documented   in unlinked concurrent exam (accession [id number])  90 cc administered   10   cc discarded  Oral Contrast: NONE  Complications: None reported at time of study completion      COMPARISON: 3/17/2020 CT chest.    FINDINGS:    PULMONARY ARTERIES: No pulmonary embolism. Main pulmonary artery diameter   3.8 cm.    MEDIASTINUM: Mildly enlarged mediastinal nodes; reference right   paratracheal 2.1 x 1.2 cm node (image 61, series 6). Right hilar   lymphadenopathy; reference 2.4 x 1.2 cm right hilar node (image 78,   series 6).    Qualitatively enlarged right ventricle. Trace pericardial effusion.   Thoracic aorta normal caliber.    AIRWAYS, LUNGS, PLEURA: Satisfactory positioning of endotracheal tube.   Peribronchial thickening and mucus impacted airways of the right middle   lobe and bilateral lower lobes. Peripheral consolidation in the lateral   right middle lobe and right base. Few patchy opacities at the left base.   Emphysema. No pleural effusion.    ABDOMEN and PELVIS: Subcentimeter hepatic hypodense lesions not   adequately characterized, but similar compared to 3/17/2020 CT chest. A 4   cm splenic lesion with Hounsfield units of 39 appears increased in size   compared to 3/17/2020 where it was 1.3 cm. Left adrenal 1.4 cm nodule   unchanged compared to 3/17/2020. Right renal 2 cm hypodense lesion,   likely cyst. Additional right renal subcentimeter lesions too small to   characterize.    Gallbladder, pancreas, right adrenal gland, and left kidney unremarkable.    Amor catheter within decompressed urinary bladder. Few bubbles of gas   within the urinary bladder may be related to Amor catheter insertion.   Unremarkable prostate.    No bowel obstruction.  Colonic diverticulosis. No free fluid. No   abdominal or pelvic lymphadenopathy. Abdominal aorta normal caliber.    BONES: Unremarkable.    SOFTTISSUES: Bilateral gynecomastia.    IMPRESSION:    No pulmonary embolism.    Aspiration and/or infection primarily involving the right middle lobe and   right base.    Mildly enlarged mediastinal nodes and right hilar lymphadenopathy;   indeterminate, but could be reactive. Recommend CT chest follow-up in 3   months to assess stability.    4 cm splenic lesion increased in size compared to 3/17/2020 is   indeterminate. Consider characterization with MRI abdomen.    --- End of Report ---        < end of copied text >  < from: CT Head No Cont (10.05.23 @ 10:20) >  FINDINGS:  There is no acute intracranial hemorrhage, parenchymal mass, mass effect   or midline shift. There is no acute territorial infarct. There is no   hydrocephalus.    The cranium is intact.    Mild mucosal thickening paranasal sinuses. Opacification mastoid air   cells and middle ear cavities. Partially visualized tube noted right oral   cavity. Prominent adenoids noted.        IMPRESSION:  No acute intracranial hemorrhage or acute territorial infarct.  If   symptoms persist, follow-up MRI exam recommended.    --- End of Report ---      < end of copied text >    Goals of Care Discussion with Family/Proxy/Other   - see note from 10/13

## 2023-10-13 NOTE — PROGRESS NOTE ADULT - ASSESSMENT
64 yo male from [facility], active smoker, with a PMHx of CAD, CHF on nocturnal BIPAP, COPD ( MULTIPLE intubations in past), peripheral neuropathy, GERD, HTN, HLD, obesity, polyarthritis, Pulmonary HTN, TIA, Vit D Def, presents with hypoxemia from nursing facility and lethargy and altered mental status. Upon evaluation in ED, patient afebrile, bp noted to be 124/77, tachycardic to 124bpm, and noted to be hypoxic on subsequently intubated. Patient admitted to ICU for Acute on chronic hypoxic hypercapnic respiratory failure requiring emergent intubation. Patient extubated and transitioned to NC.   10/11: Downgraded to SCU for further management. On Meropenem until 10/12 for urosepsis. F/u Bcx. Pending PT eval for placement. On 2L and tolerating well. BiPAP QHS. ID following. Phos replaced.

## 2023-10-13 NOTE — PROGRESS NOTE ADULT - PROBLEM SELECTOR PLAN 3
Completed meropenem.  Encourage to maintain adequate hydration.
likely secondary to pna and UTI.  Last day of meropenem today.  continue to monitor.
O2 supplement  Maintain O2 Sat > 94%   C/w Montelukast, Budesonide/Formoterol   Albuterol PRN   S/p steroids  F/u with Pulm at facility   BiPAP QHS

## 2023-10-13 NOTE — PROGRESS NOTE ADULT - SUBJECTIVE AND OBJECTIVE BOX
Patient is a 63y old  Male who presents with a chief complaint of Acute on chronic hypoxemic hypercapnic respiratory failure (12 Oct 2023 18:21)    PATIENT IS SEEN AND EXAMINED IN MEDICAL FLOOR.  EMIT [    ]    MT [   ]      GT [   ]    ALLERGIES:  No Known Allergies      Daily     Daily     VITALS:    Vital Signs Last 24 Hrs  T(C): 36.4 (13 Oct 2023 05:35), Max: 36.4 (12 Oct 2023 13:25)  T(F): 97.5 (13 Oct 2023 05:35), Max: 97.5 (12 Oct 2023 13:25)  HR: 74 (13 Oct 2023 05:35) (73 - 97)  BP: 114/65 (13 Oct 2023 05:35) (114/65 - 129/66)  BP(mean): --  RR: 20 (13 Oct 2023 05:35) (19 - 20)  SpO2: 97% (13 Oct 2023 05:35) (95% - 97%)    Parameters below as of 13 Oct 2023 05:35  Patient On (Oxygen Delivery Method): nasal cannula  O2 Flow (L/min): 2      LABS:    CBC Full  -  ( 13 Oct 2023 06:20 )  WBC Count : 10.39 K/uL  RBC Count : 4.75 M/uL  Hemoglobin : 13.3 g/dL  Hematocrit : 43.0 %  Platelet Count - Automated : 195 K/uL  Mean Cell Volume : 90.5 fl  Mean Cell Hemoglobin : 28.0 pg  Mean Cell Hemoglobin Concentration : 30.9 gm/dL  Auto Neutrophil # : x  Auto Lymphocyte # : x  Auto Monocyte # : x  Auto Eosinophil # : x  Auto Basophil # : x  Auto Neutrophil % : x  Auto Lymphocyte % : x  Auto Monocyte % : x  Auto Eosinophil % : x  Auto Basophil % : x      10-13    141  |  105  |  12  ----------------------------<  210<H>  4.1   |  31  |  0.68    Ca    7.9<L>      13 Oct 2023 06:20  Phos  3.6     10-13  Mg     2.3     10-12    TPro  6.3  /  Alb  2.8<L>  /  TBili  0.7  /  DBili  x   /  AST  14  /  ALT  64<H>  /  AlkPhos  62  10-13    CAPILLARY BLOOD GLUCOSE      POCT Blood Glucose.: 193 mg/dL (13 Oct 2023 08:02)  POCT Blood Glucose.: 267 mg/dL (12 Oct 2023 21:25)  POCT Blood Glucose.: 132 mg/dL (12 Oct 2023 16:52)  POCT Blood Glucose.: 156 mg/dL (12 Oct 2023 11:49)        LIVER FUNCTIONS - ( 13 Oct 2023 06:20 )  Alb: 2.8 g/dL / Pro: 6.3 g/dL / ALK PHOS: 62 U/L / ALT: 64 U/L DA / AST: 14 U/L / GGT: x           Creatinine Trend: 0.68<--, 0.64<--, 0.56<--, 0.65<--, 0.68<--, 0.69<--  I&O's Summary    12 Oct 2023 07:01  -  13 Oct 2023 07:00  --------------------------------------------------------  IN: 0 mL / OUT: 1600 mL / NET: -1600 mL            .Blood Blood-Peripheral  10-09 @ 14:07   No growth at 72 Hours  --  --      .Blood Blood-Peripheral  10-09 @ 14:02   No growth at 72 Hours  --  --      Clean Catch Clean Catch (Midstream)  10-05 @ 08:30   >100,000 CFU/ml Enterococcus faecalis  --  Enterococcus faecalis      .Blood Blood-Peripheral  10-05 @ 08:15   No growth at 5 days  --  --      .Blood Blood-Peripheral  10-05 @ 08:00   No growth at 5 days  --  --      .Blood Blood-Peripheral  10-05 @ 05:35   No growth at 5 days  --  --      .Blood Blood-Peripheral  10-05 @ 05:25   No growth at 5 days  --  --          MEDICATIONS:    MEDICATIONS  (STANDING):  aspirin  chewable 81 milliGRAM(s) Oral daily  budesonide 160 MICROgram(s)/formoterol 4.5 MICROgram(s) Inhaler 2 Puff(s) Inhalation two times a day  chlorhexidine 2% Cloths 1 Application(s) Topical <User Schedule>  dextrose 5%. 1000 milliLiter(s) (100 mL/Hr) IV Continuous <Continuous>  glucagon  Injectable 1 milliGRAM(s) IntraMuscular once  heparin   Injectable 5000 Unit(s) SubCutaneous every 8 hours  insulin glargine Injectable (LANTUS) 35 Unit(s) SubCutaneous at bedtime  insulin lispro (ADMELOG) corrective regimen sliding scale   SubCutaneous three times a day before meals  insulin lispro (ADMELOG) corrective regimen sliding scale   SubCutaneous at bedtime  insulin lispro Injectable (ADMELOG) 15 Unit(s) SubCutaneous three times a day before meals  montelukast  Chewable 5 milliGRAM(s) Oral at bedtime  nicotine -   7 mG/24Hr(s) Patch 1 Patch Transdermal daily  OLANZapine 5 milliGRAM(s) Oral at bedtime  pantoprazole    Tablet 40 milliGRAM(s) Oral before breakfast  roflumilast 500 MICROGram(s) Oral daily  tamsulosin 0.4 milliGRAM(s) Oral at bedtime      MEDICATIONS  (PRN):  albuterol    90 MICROgram(s) HFA Inhaler 2 Puff(s) Inhalation every 6 hours PRN Shortness of Breath and/or Wheezing  oxycodone    5 mG/acetaminophen 325 mG 1 Tablet(s) Oral every 8 hours PRN Severe Pain (7 - 10)      REVIEW OF SYSTEMS:                           ALL ROS DONE [ X   ]    CONSTITUTIONAL:  LETHARGIC [   ], FEVER [   ], UNRESPONSIVE [   ]  CVS:  CP  [   ], SOB, [   ], PALPITATIONS [   ], DIZZYNESS [   ]  RS: COUGH [   ], SPUTUM [   ]  GI: ABDOMINAL PAIN [   ], NAUSEA [   ], VOMITINGS [   ], DIARRHEA [   ], CONSTIPATION [   ]  :  DYSURIA [   ], NOCTURIA [   ], INCREASED FREQUENCY [   ], DRIBLING [   ],  SKELETAL: PAINFUL JOINTS [   ], SWOLLEN JOINTS [   ], NECK ACHE [   ], LOW BACK ACHE [   ],  SKIN : ULCERS [   ], RASH [   ], ITCHING [   ]  CNS: HEAD ACHE [   ], DOUBLE VISION [   ], BLURRED VISION [   ], AMS / CONFUSION [   ], SEIZURES [   ], WEAKNESS [   ],TINGLING / NUMBNESS [   ]    PHYSICAL EXAMINATION:  GENERAL APPEARANCE: NO DISTRESS  HEENT:  NO PALLOR, NO  JVD,  NO   NODES, NECK SUPPLE  CVS: S1 +, S2 +,   RS: AEEB,  OCCASIONAL  RALES +,   RONCHI  ABD: SOFT, NT, NO, BS +  EXT: NO PE  SKIN: WARM,   SKELETAL:  ROM ACCEPTABLE  CNS:  AAO X 2    RADIOLOGY :    RADIOLOGY AND READINGS REVIEWED    ASSESSMENT :       PLAN:  HPI:  Patient is a 62 yo male from [facility], active smoker, with a PMHx of CAD, CHF on nocturnal BIPAP, COPD ( MULTIPLE intubations in past), peripheral neuropathy, GERD, HTN, HLD, obesity, polyarthritis, Pulmonary HTN, TIA, Vit D Def, presents with hypoxemia from nursing facility and lethargy. Patient unable to provide medical history. As per ED, documentation patient had an abrupt onset shortness of breath, generalized weakness, elevated heart rate, hypoxia to the 70s.     Upon chart review, patient was admitted to [facility] ICU in March 2022 due to acute on chronic hypoxic hypercapnic respiratory failure. Patient was eventually weaned off ventilator and then discharged home.   (05 Oct 2023 08:23)    # ACUTE ON CHRONIC HYPOXIC HYPERCAPNEIC RESPIRATORY FAILURE , ? ASPIRATION PNA  # HX OF COPD  # CHRONIC CHF    - S/P MEROPENEM, F/U BCX [NGTD]  - CTA CHEST - ? RIGHT MIDDLE AND LOWER LOBE INFILTRATE  - ON DUONEB, SOLUMEDROL  - [ON SPIRIVA, SYMBICORT, ALBUTEROL - AT FACILITY]  - ECHO - NORMAL LV EF [50-55%], G1DD,   - CRITICAL CARE MANAGEMENT IN PROGRESS  - ID CONSULT    - DALIRESP  - s/p mechanical ventilation    - PRECEDEX DISCONTINUED DUE TO HYPERPYREXIA    # SEPSIS S/T UTI + PNA  - S/P MEROPENEM, UCX [E. FAECALIS] AND BCX [NGTD]  - ID CONSULT    # ACUTE METABOLIC ENCEPHALOPATHY  - NOTED CT HEAD    # HYPERKALEMIA - IMPROVED  - S/P INSULIN + D50    # DM - LANTUS, SSI  + FS  # HTN  # HLD  # HX OF CAD  # GERD  # GI AND DVT PPX   Patient is a 63y old  Male who presents with a chief complaint of Acute on chronic hypoxemic hypercapnic respiratory failure (12 Oct 2023 18:21)    PATIENT IS SEEN AND EXAMINED IN MEDICAL FLOOR.      ALLERGIES:  No Known Allergies      VITALS:    Vital Signs Last 24 Hrs  T(C): 36.4 (13 Oct 2023 05:35), Max: 36.4 (12 Oct 2023 13:25)  T(F): 97.5 (13 Oct 2023 05:35), Max: 97.5 (12 Oct 2023 13:25)  HR: 74 (13 Oct 2023 05:35) (73 - 97)  BP: 114/65 (13 Oct 2023 05:35) (114/65 - 129/66)  BP(mean): --  RR: 20 (13 Oct 2023 05:35) (19 - 20)  SpO2: 97% (13 Oct 2023 05:35) (95% - 97%)    Parameters below as of 13 Oct 2023 05:35  Patient On (Oxygen Delivery Method): nasal cannula  O2 Flow (L/min): 2      LABS:    CBC Full  -  ( 13 Oct 2023 06:20 )  WBC Count : 10.39 K/uL  RBC Count : 4.75 M/uL  Hemoglobin : 13.3 g/dL  Hematocrit : 43.0 %  Platelet Count - Automated : 195 K/uL  Mean Cell Volume : 90.5 fl  Mean Cell Hemoglobin : 28.0 pg  Mean Cell Hemoglobin Concentration : 30.9 gm/dL  Auto Neutrophil # : x  Auto Lymphocyte # : x  Auto Monocyte # : x  Auto Eosinophil # : x  Auto Basophil # : x  Auto Neutrophil % : x  Auto Lymphocyte % : x  Auto Monocyte % : x  Auto Eosinophil % : x  Auto Basophil % : x      10-13    141  |  105  |  12  ----------------------------<  210<H>  4.1   |  31  |  0.68    Ca    7.9<L>      13 Oct 2023 06:20  Phos  3.6     10-13  Mg     2.3     10-12    TPro  6.3  /  Alb  2.8<L>  /  TBili  0.7  /  DBili  x   /  AST  14  /  ALT  64<H>  /  AlkPhos  62  10-13    CAPILLARY BLOOD GLUCOSE      POCT Blood Glucose.: 193 mg/dL (13 Oct 2023 08:02)  POCT Blood Glucose.: 267 mg/dL (12 Oct 2023 21:25)  POCT Blood Glucose.: 132 mg/dL (12 Oct 2023 16:52)  POCT Blood Glucose.: 156 mg/dL (12 Oct 2023 11:49)        LIVER FUNCTIONS - ( 13 Oct 2023 06:20 )  Alb: 2.8 g/dL / Pro: 6.3 g/dL / ALK PHOS: 62 U/L / ALT: 64 U/L DA / AST: 14 U/L / GGT: x           Creatinine Trend: 0.68<--, 0.64<--, 0.56<--, 0.65<--, 0.68<--, 0.69<--  I&O's Summary    12 Oct 2023 07:01  -  13 Oct 2023 07:00  --------------------------------------------------------  IN: 0 mL / OUT: 1600 mL / NET: -1600 mL            .Blood Blood-Peripheral  10-09 @ 14:07   No growth at 72 Hours  --  --      .Blood Blood-Peripheral  10-09 @ 14:02   No growth at 72 Hours  --  --      Clean Catch Clean Catch (Midstream)  10-05 @ 08:30   >100,000 CFU/ml Enterococcus faecalis  --  Enterococcus faecalis      .Blood Blood-Peripheral  10-05 @ 08:15   No growth at 5 days  --  --      .Blood Blood-Peripheral  10-05 @ 08:00   No growth at 5 days  --  --      .Blood Blood-Peripheral  10-05 @ 05:35   No growth at 5 days  --  --      .Blood Blood-Peripheral  10-05 @ 05:25   No growth at 5 days  --  --          MEDICATIONS:    MEDICATIONS  (STANDING):  aspirin  chewable 81 milliGRAM(s) Oral daily  budesonide 160 MICROgram(s)/formoterol 4.5 MICROgram(s) Inhaler 2 Puff(s) Inhalation two times a day  chlorhexidine 2% Cloths 1 Application(s) Topical <User Schedule>  dextrose 5%. 1000 milliLiter(s) (100 mL/Hr) IV Continuous <Continuous>  glucagon  Injectable 1 milliGRAM(s) IntraMuscular once  heparin   Injectable 5000 Unit(s) SubCutaneous every 8 hours  insulin glargine Injectable (LANTUS) 35 Unit(s) SubCutaneous at bedtime  insulin lispro (ADMELOG) corrective regimen sliding scale   SubCutaneous three times a day before meals  insulin lispro (ADMELOG) corrective regimen sliding scale   SubCutaneous at bedtime  insulin lispro Injectable (ADMELOG) 15 Unit(s) SubCutaneous three times a day before meals  montelukast  Chewable 5 milliGRAM(s) Oral at bedtime  nicotine -   7 mG/24Hr(s) Patch 1 Patch Transdermal daily  OLANZapine 5 milliGRAM(s) Oral at bedtime  pantoprazole    Tablet 40 milliGRAM(s) Oral before breakfast  roflumilast 500 MICROGram(s) Oral daily  tamsulosin 0.4 milliGRAM(s) Oral at bedtime      MEDICATIONS  (PRN):  albuterol    90 MICROgram(s) HFA Inhaler 2 Puff(s) Inhalation every 6 hours PRN Shortness of Breath and/or Wheezing  oxycodone    5 mG/acetaminophen 325 mG 1 Tablet(s) Oral every 8 hours PRN Severe Pain (7 - 10)      REVIEW OF SYSTEMS:                           ALL ROS DONE [ X   ]    CONSTITUTIONAL:  LETHARGIC [   ], FEVER [   ], UNRESPONSIVE [   ]  CVS:  CP  [   ], SOB, [   ], PALPITATIONS [   ], DIZZYNESS [   ]  RS: COUGH [   ], SPUTUM [   ]  GI: ABDOMINAL PAIN [   ], NAUSEA [   ], VOMITINGS [   ], DIARRHEA [   ], CONSTIPATION [   ]  :  DYSURIA [   ], NOCTURIA [   ], INCREASED FREQUENCY [   ], DRIBLING [   ],  SKELETAL: PAINFUL JOINTS [   ], SWOLLEN JOINTS [   ], NECK ACHE [   ], LOW BACK ACHE [   ],  SKIN : ULCERS [   ], RASH [   ], ITCHING [   ]  CNS: HEAD ACHE [   ], DOUBLE VISION [   ], BLURRED VISION [   ], AMS / CONFUSION [   ], SEIZURES [   ], WEAKNESS [   ],TINGLING / NUMBNESS [   ]    PHYSICAL EXAMINATION:  GENERAL APPEARANCE: NO DISTRESS  HEENT:  NO PALLOR, NO  JVD,  NO   NODES, NECK SUPPLE  CVS: S1 +, S2 +,   RS: AEEB,  OCCASIONAL  RALES +,   RONCHI [IMPROVED]  ABD: SOFT, NT, NO, BS +  EXT: NO PE  SKIN: WARM,   SKELETAL:  ROM ACCEPTABLE  CNS:  AAO X 2    RADIOLOGY :    RADIOLOGY AND READINGS REVIEWED    ASSESSMENT :       PLAN:  HPI:  Patient is a 62 yo male from [facility], active smoker, with a PMHx of CAD, CHF on nocturnal BIPAP, COPD ( MULTIPLE intubations in past), peripheral neuropathy, GERD, HTN, HLD, obesity, polyarthritis, Pulmonary HTN, TIA, Vit D Def, presents with hypoxemia from nursing facility and lethargy. Patient unable to provide medical history. As per ED, documentation patient had an abrupt onset shortness of breath, generalized weakness, elevated heart rate, hypoxia to the 70s.     Upon chart review, patient was admitted to [facility] ICU in March 2022 due to acute on chronic hypoxic hypercapnic respiratory failure. Patient was eventually weaned off ventilator and then discharged home.   (05 Oct 2023 08:23)    # D/C PLANNED FOR [facility]     # ACUTE ON CHRONIC HYPOXIC HYPERCAPNEIC RESPIRATORY FAILURE , ? ASPIRATION PNA  # HX OF COPD  # CHRONIC CHF    - S/P MEROPENEM, F/U BCX [NGTD]  - CTA CHEST - ? RIGHT MIDDLE AND LOWER LOBE INFILTRATE  - ON DUONEB, SOLUMEDROL  - [ON SPIRIVA, SYMBICORT, ALBUTEROL - AT FACILITY]  - ECHO - NORMAL LV EF [50-55%], G1DD,   - CRITICAL CARE MANAGEMENT IN PROGRESS  - ID CONSULT    - DALIRESP  - s/p mechanical ventilation    - PRECEDEX DISCONTINUED DUE TO HYPERPYREXIA    # SEPSIS S/T UTI + PNA  - S/P MEROPENEM, UCX [E. FAECALIS] AND BCX [NGTD]  - ID CONSULT    # ACUTE METABOLIC ENCEPHALOPATHY  - NOTED CT HEAD    # HYPERKALEMIA - IMPROVED  - S/P INSULIN + D50    # DM - LANTUS, SSI  + FS  # HTN  # HLD  # HX OF CAD  # GERD  # GI AND DVT PPX

## 2023-10-13 NOTE — PROGRESS NOTE ADULT - PROBLEM SELECTOR PLAN 5
Hx of stent  C/w ASA  DASH/TLC diet
COPD exacerbation  GOLD 4D  Intubated on admission and extubated 10/09  Continue to encourage BIPAP nightly and as needed during the day.  Oxygen supplementation when off BIPAP. Monitor oxygen saturation.  Continue bronchodilators and ICS  Restart daliresp 500mg daily.  Smoking cessation counseling.  Continue nicotine patch. Patient does not higher dose nicotine patch.
COPD exacerbation  GOLD 4D  Intubated on admission and extubated 10/09  Continue to encourage BIPAP nightly and as needed during the day.  Oxygen supplementation when off BIPAP. Monitor oxygen saturation.  Continue bronchodilators and ICS  Restart daliresp 500mg daily.  Smoking cessation counseling.  Continue nicotine patch. Patient does not higher dose nicotine patch.

## 2023-10-13 NOTE — PROGRESS NOTE ADULT - PROBLEM SELECTOR PLAN 8
DVT and GI prophylaxis.  BIPAP nightly and as needed during the day.  Continue oxygen supplementation.  Monitor oxygen saturation.  Bronchodilators and ICS.  Continue daliresp 500mg daily.  Completed meropenem.  Discharge planning started.  Medically cleared for discharge.
DVT ppx: Heparin  GI ppx: PPI
DVT and GI prophylaxis.  BIPAP nightly and as needed during the day.  Continue oxygen supplementation.  Monitor oxygen saturation.  Bronchodilators and ICS.  Continue daliresp 500mg daily.  Last day of meropenem.  Discharge planning started.

## 2023-10-13 NOTE — DISCHARGE NOTE NURSING/CASE MANAGEMENT/SOCIAL WORK - NSDCPEFALRISK_GEN_ALL_CORE
For information on Fall & Injury Prevention, visit: [email or web address] OR  [email or web address] OR  https://www.cdc.gov/steadi/patient.html

## 2023-10-13 NOTE — PROGRESS NOTE ADULT - PROBLEM SELECTOR PLAN 1
Likely secondary to COPD exacerbation.  GOLD 4D  intubated upon admission and extubated 10/09.  Multiply intubations in the past. Noncompliant with BIPAP  Encourage BIPAP usage nightly and as needed during the day.  Continue oxygen supplementation. Monitor oxygen supplementation.  Bronchodilators and ICS  Daliresp restarted.

## 2023-10-13 NOTE — PROGRESS NOTE ADULT - REASON FOR ADMISSION
Acute on chronic hypoxemic hypercapnic respiratory failure

## 2023-10-13 NOTE — GOALS OF CARE CONVERSATION - ADVANCED CARE PLANNING - CONVERSATION DETAILS
Discussed at length with patient current diagnosis, treatment plan and prognosis. Patient understands that he needs to use BIPAP nightly and he doesn't he is a high risk for intubation and possible. He understands this and states that he will try to use BIPAP more.  He wants to remain a FULL CODE since he has been successfully extubated many times. If he needs to be intubated again and he can not be weaned off the ventilator at that point he will decide about changing is code status. MOLST discussed in detail and filled out as per patient's wishes and placed on chart.

## 2023-10-13 NOTE — PROGRESS NOTE ADULT - PROBLEM SELECTOR PLAN 4
Likely secondary to hypoxia and hypercapnia.  Now resolved.  Maintain safety measures.
Not on exacerbation  Monitor for fluid overload   ECHO 10/09: Grade I diastolic dysfunction. EF 50-55%  DASH diet
Last day of meropenem.  Encourage maintain adequate hydration.

## 2023-10-13 NOTE — PROGRESS NOTE ADULT - PROBLEM SELECTOR PROBLEM 1
Acute on chronic respiratory failure with hypoxia and hypercapnia
Acute on chronic respiratory failure with hypoxia and hypercapnia
Encephalopathy

## 2023-10-13 NOTE — PROGRESS NOTE ADULT - PROBLEM SELECTOR PLAN 6
BG in the 200s-300s   Changes made to Insulin regimen  C/w Lantus 35 units QHS  Moderate SS and Evening coverage  15 units Lispro TID AC  Consider Endocrinology consult if no improvement   A1C 11.2  Diabetic diet.   S/p steroids
Continue 35U lantus daily  15U lispro premeal and continue sliding scale coverage.  Monitor glucose.
Continue 35U lantus daily  15U lispro premeal and continue sliding scale coverage.  Monitor glucose.

## 2023-10-13 NOTE — DISCHARGE NOTE NURSING/CASE MANAGEMENT/SOCIAL WORK - PATIENT PORTAL LINK FT
You can access the FollowMyHealth Patient Portal offered by [facility] by registering at the following website: [email or web address]. By joining [facility] FollowMyHealth portal, you will also be able to view your health information using other applications (apps) compatible with our system.

## 2023-10-13 NOTE — PROGRESS NOTE ADULT - PROBLEM SELECTOR PROBLEM 5
COPD (chronic obstructive pulmonary disease)
CAD (coronary artery disease)
COPD (chronic obstructive pulmonary disease)

## 2023-10-13 NOTE — PROGRESS NOTE ADULT - PROBLEM SELECTOR PLAN 7
Current smoker.  Continue nicotine patch 7mg. Patient does not like how the higher patch makes him feel.  Smoking cessation counseling.
Current smoker.  Continue nicotine patch 7mg. Patient does not like how the higher patch makes him feel.  Smoking cessation counseling.
C/w PPI

## 2023-10-13 NOTE — PROGRESS NOTE ADULT - PROBLEM SELECTOR PLAN 2
Ucx + for Enterococcus facaelis   On Meropenem until 10/12  F/u Bcx  ID following   Afebrile  No leukocytosis  CBC in AM
likely secondary to pna and UTI  Antibiotics completed.  Continue to monitor.
Likely secondary to hypoxia and hypercapnia.  Now resolved.  Maintain safety measures.

## 2023-10-13 NOTE — PROGRESS NOTE ADULT - NS ATTEND AMEND GEN_ALL_CORE FT
62 yo man  from [facility], active smoker, with  CAD, CHF on nocturnal BIPAP, COPD ( MULTIPLE intubations in past), peripheral neuropathy, GERD, HTN, HLD, obesity, polyarthritis, Pulmonary HTN, TIA, Vit D Def, presents with hypoxemia from nursing facility and lethargy and altered mental status. Upon evaluation in ED, patient afebrile, bp noted to be 124/77, tachycardic to 124bpm, and noted to be hypoxic on subsequently intubated. Physical examination noted for course lung examination, mark catheter placed and immediately draining 1.5L of pyuria. Labs significant for ABG 7.19/105/230/40,  potassium noted to be 6.2,  utox positive for benzo, wbc noted to be 22 and lactate normal. UA showing findings concerning for UTI, blood cultures pending. CT head, CTA chest and abdomen pending. Patient admitted to ICU for Acute on chronic hypoxic hypercapnic respiratory failure requiring emergent intubation.    ASSESSMENT   - Acute encephalopathy   - Acute on chronic hypoxic hypercapnic respiratory failure  - Sepsis 2/2 UTI  - Hyperkalemia   - COPD exacerbation  - DM uncontrol   - Chronic CHF   - Hx of CAD  - HTN HLD  -GERD         Plan   - Extubated 10/9   - Oxygen support to maintain saturation > 88%  - Duonebs as needed  - Cont. Symbicort   - Hemodynamic monitoring   - Cont. antibiotics for now  - Oral diet  - Nicotine patch   - Monitor blood glucose with coverage   - DVT GI prophy   - PT evaluation   - OOB to chair  - Discharge planning
Discharge back to facility   Encourage nocturnal bipap use   Smoking cessation recommended
Problem/Plan - 1:  ·  Problem: Acute on chronic respiratory failure with hypoxia and hypercapnia.   ·  Plan: Likely secondary to COPD exacerbation.  GOLD 4D  intubated upon admission and extubated 10/09.  Multiply intubations in the past. Noncompliant with BIPAP  Encourage BIPAP usage nightly and as needed during the day.  Continue oxygen supplementation. Monitor oxygen supplementation.  Bronchodilators and ICS  Restart daliresp.     Problem/Plan - 2:  ·  Problem: Encephalopathy.   ·  Plan: Likely secondary to hypoxia and hypercapnia.  Now resolved.  Maintain safety measures.     Problem/Plan - 3:  ·  Problem: Sepsis.   ·  Plan: likely secondary to pna and UTI.  Last day of meropenem today.  continue to monitor.     Problem/Plan - 4:  ·  Problem: Sepsis secondary to UTI.   ·  Plan: Last day of meropenem.  Encourage maintain adequate hydration.     Problem/Plan - 5:  ·  Problem: COPD (chronic obstructive pulmonary disease).   ·  Plan: COPD exacerbation  GOLD 4D  Intubated on admission and extubated 10/09  Continue to encourage BIPAP nightly and as needed during the day.  Oxygen supplementation when off BIPAP. Monitor oxygen saturation.  Continue bronchodilators and ICS  Restart daliresp 500mg daily.  Smoking cessation counseling.  Continue nicotine patch. Patient does not higher dose nicotine patch.     Problem/Plan - 6:  ·  Problem: DM (diabetes mellitus).  ·  Plan: Continue 35U lantus daily  15U lispro premeal and continue sliding scale coverage.  Monitor glucose.     Problem/Plan - 7:  ·  Problem: Nicotine addiction.  ·  Plan: Current smoker.  Continue nicotine patch 7mg. Patient does not like how the higher patch makes him feel.  Smoking cessation counseling.     Problem/Plan - 8:  ·  Problem: Advance care planning.  ·  Plan: DVT and GI prophylaxis.  BIPAP nightly and as needed during the day.  Continue oxygen supplementation.  Monitor oxygen saturation.  Bronchodilators and ICS.  Continue daliresp 500mg daily.  Last day of meropenem.  Discharge planning started.

## 2023-10-26 NOTE — ED PROVIDER NOTE - WR ORDER NAME 1
Patient dose not want the Hepatitis C screen , the Diabetes Screen, the Colonoscopy, the Breast cancer screen or the Dexa scan. Xray Chest 1 View- PORTABLE-Urgent

## 2023-11-10 NOTE — ED ADULT NURSE NOTE - NSIMPLEMENTINTERV_GEN_ALL_ED
Implemented All Universal Safety Interventions:  [place] to call system. Call bell, personal items and telephone within reach. Instruct patient to call for assistance. Room bathroom lighting operational. Non-slip footwear when patient is off stretcher. Physically safe environment: no spills, clutter or unnecessary equipment. Stretcher in lowest position, wheels locked, appropriate side rails in place.
- - -

## 2023-11-16 NOTE — PROGRESS NOTE ADULT - PROVIDER SPECIALTY LIST ADULT
Endocrinology Erythromycin Counseling:  I discussed with the patient the risks of erythromycin including but not limited to GI upset, allergic reaction, drug rash, diarrhea, increase in liver enzymes, and yeast infections.

## 2023-12-24 ENCOUNTER — INPATIENT (INPATIENT)
Facility: HOSPITAL | Age: 63
LOS: 8 days | Discharge: HOME HEALTH | DRG: 208 | End: 2024-01-02
Attending: INTERNAL MEDICINE | Admitting: INTERNAL MEDICINE
Payer: MEDICAID

## 2023-12-24 VITALS — OXYGEN SATURATION: 97 % | HEART RATE: 120 BPM | WEIGHT: 200.62 LBS | RESPIRATION RATE: 22 BRPM

## 2023-12-24 DIAGNOSIS — J96.02 ACUTE RESPIRATORY FAILURE WITH HYPERCAPNIA: ICD-10-CM

## 2023-12-24 LAB
ALBUMIN SERPL ELPH-MCNC: 3.5 G/DL — SIGNIFICANT CHANGE UP (ref 3.5–5)
ALBUMIN SERPL ELPH-MCNC: 3.5 G/DL — SIGNIFICANT CHANGE UP (ref 3.5–5)
ALP SERPL-CCNC: 55 U/L — SIGNIFICANT CHANGE UP (ref 40–120)
ALP SERPL-CCNC: 55 U/L — SIGNIFICANT CHANGE UP (ref 40–120)
ALT FLD-CCNC: 33 U/L DA — SIGNIFICANT CHANGE UP (ref 10–60)
ALT FLD-CCNC: 33 U/L DA — SIGNIFICANT CHANGE UP (ref 10–60)
ANION GAP SERPL CALC-SCNC: 0 MMOL/L — LOW (ref 5–17)
ANION GAP SERPL CALC-SCNC: 0 MMOL/L — LOW (ref 5–17)
APPEARANCE UR: CLEAR — SIGNIFICANT CHANGE UP
APPEARANCE UR: CLEAR — SIGNIFICANT CHANGE UP
APTT BLD: 29.2 SEC — SIGNIFICANT CHANGE UP (ref 24.5–35.6)
APTT BLD: 29.2 SEC — SIGNIFICANT CHANGE UP (ref 24.5–35.6)
AST SERPL-CCNC: 10 U/L — SIGNIFICANT CHANGE UP (ref 10–40)
AST SERPL-CCNC: 10 U/L — SIGNIFICANT CHANGE UP (ref 10–40)
BASOPHILS # BLD AUTO: 0.02 K/UL — SIGNIFICANT CHANGE UP (ref 0–0.2)
BASOPHILS # BLD AUTO: 0.02 K/UL — SIGNIFICANT CHANGE UP (ref 0–0.2)
BASOPHILS NFR BLD AUTO: 0.2 % — SIGNIFICANT CHANGE UP (ref 0–2)
BASOPHILS NFR BLD AUTO: 0.2 % — SIGNIFICANT CHANGE UP (ref 0–2)
BILIRUB SERPL-MCNC: 0.2 MG/DL — SIGNIFICANT CHANGE UP (ref 0.2–1.2)
BILIRUB SERPL-MCNC: 0.2 MG/DL — SIGNIFICANT CHANGE UP (ref 0.2–1.2)
BILIRUB UR-MCNC: NEGATIVE — SIGNIFICANT CHANGE UP
BILIRUB UR-MCNC: NEGATIVE — SIGNIFICANT CHANGE UP
BUN SERPL-MCNC: 22 MG/DL — HIGH (ref 7–18)
BUN SERPL-MCNC: 22 MG/DL — HIGH (ref 7–18)
CALCIUM SERPL-MCNC: 8.3 MG/DL — LOW (ref 8.4–10.5)
CALCIUM SERPL-MCNC: 8.3 MG/DL — LOW (ref 8.4–10.5)
CHLORIDE SERPL-SCNC: 89 MMOL/L — LOW (ref 96–108)
CHLORIDE SERPL-SCNC: 89 MMOL/L — LOW (ref 96–108)
CO2 SERPL-SCNC: 44 MMOL/L — HIGH (ref 22–31)
CO2 SERPL-SCNC: 44 MMOL/L — HIGH (ref 22–31)
COLOR SPEC: YELLOW — SIGNIFICANT CHANGE UP
COLOR SPEC: YELLOW — SIGNIFICANT CHANGE UP
CREAT SERPL-MCNC: 0.79 MG/DL — SIGNIFICANT CHANGE UP (ref 0.5–1.3)
CREAT SERPL-MCNC: 0.79 MG/DL — SIGNIFICANT CHANGE UP (ref 0.5–1.3)
DIFF PNL FLD: NEGATIVE — SIGNIFICANT CHANGE UP
DIFF PNL FLD: NEGATIVE — SIGNIFICANT CHANGE UP
EGFR: 101 ML/MIN/1.73M2 — SIGNIFICANT CHANGE UP
EGFR: 101 ML/MIN/1.73M2 — SIGNIFICANT CHANGE UP
EOSINOPHIL # BLD AUTO: 0.01 K/UL — SIGNIFICANT CHANGE UP (ref 0–0.5)
EOSINOPHIL # BLD AUTO: 0.01 K/UL — SIGNIFICANT CHANGE UP (ref 0–0.5)
EOSINOPHIL NFR BLD AUTO: 0.1 % — SIGNIFICANT CHANGE UP (ref 0–6)
EOSINOPHIL NFR BLD AUTO: 0.1 % — SIGNIFICANT CHANGE UP (ref 0–6)
GLUCOSE SERPL-MCNC: 391 MG/DL — HIGH (ref 70–99)
GLUCOSE SERPL-MCNC: 391 MG/DL — HIGH (ref 70–99)
GLUCOSE UR QL: >=1000 MG/DL
GLUCOSE UR QL: >=1000 MG/DL
HCT VFR BLD CALC: 44.8 % — SIGNIFICANT CHANGE UP (ref 39–50)
HCT VFR BLD CALC: 44.8 % — SIGNIFICANT CHANGE UP (ref 39–50)
HGB BLD-MCNC: 12.9 G/DL — LOW (ref 13–17)
HGB BLD-MCNC: 12.9 G/DL — LOW (ref 13–17)
HOROWITZ INDEX BLDA+IHG-RTO: 50 — SIGNIFICANT CHANGE UP
HOROWITZ INDEX BLDA+IHG-RTO: 50 — SIGNIFICANT CHANGE UP
IMM GRANULOCYTES NFR BLD AUTO: 2.9 % — HIGH (ref 0–0.9)
IMM GRANULOCYTES NFR BLD AUTO: 2.9 % — HIGH (ref 0–0.9)
INR BLD: 0.96 RATIO — SIGNIFICANT CHANGE UP (ref 0.85–1.18)
INR BLD: 0.96 RATIO — SIGNIFICANT CHANGE UP (ref 0.85–1.18)
KETONES UR-MCNC: NEGATIVE MG/DL — SIGNIFICANT CHANGE UP
KETONES UR-MCNC: NEGATIVE MG/DL — SIGNIFICANT CHANGE UP
LACTATE SERPL-SCNC: 0.9 MMOL/L — SIGNIFICANT CHANGE UP (ref 0.7–2)
LACTATE SERPL-SCNC: 0.9 MMOL/L — SIGNIFICANT CHANGE UP (ref 0.7–2)
LEUKOCYTE ESTERASE UR-ACNC: NEGATIVE — SIGNIFICANT CHANGE UP
LEUKOCYTE ESTERASE UR-ACNC: NEGATIVE — SIGNIFICANT CHANGE UP
LYMPHOCYTES # BLD AUTO: 0.69 K/UL — LOW (ref 1–3.3)
LYMPHOCYTES # BLD AUTO: 0.69 K/UL — LOW (ref 1–3.3)
LYMPHOCYTES # BLD AUTO: 7.1 % — LOW (ref 13–44)
LYMPHOCYTES # BLD AUTO: 7.1 % — LOW (ref 13–44)
MCHC RBC-ENTMCNC: 27.9 PG — SIGNIFICANT CHANGE UP (ref 27–34)
MCHC RBC-ENTMCNC: 27.9 PG — SIGNIFICANT CHANGE UP (ref 27–34)
MCHC RBC-ENTMCNC: 28.8 GM/DL — LOW (ref 32–36)
MCHC RBC-ENTMCNC: 28.8 GM/DL — LOW (ref 32–36)
MCV RBC AUTO: 96.8 FL — SIGNIFICANT CHANGE UP (ref 80–100)
MCV RBC AUTO: 96.8 FL — SIGNIFICANT CHANGE UP (ref 80–100)
MONOCYTES # BLD AUTO: 0.27 K/UL — SIGNIFICANT CHANGE UP (ref 0–0.9)
MONOCYTES # BLD AUTO: 0.27 K/UL — SIGNIFICANT CHANGE UP (ref 0–0.9)
MONOCYTES NFR BLD AUTO: 2.8 % — SIGNIFICANT CHANGE UP (ref 2–14)
MONOCYTES NFR BLD AUTO: 2.8 % — SIGNIFICANT CHANGE UP (ref 2–14)
NEUTROPHILS # BLD AUTO: 8.46 K/UL — HIGH (ref 1.8–7.4)
NEUTROPHILS # BLD AUTO: 8.46 K/UL — HIGH (ref 1.8–7.4)
NEUTROPHILS NFR BLD AUTO: 86.9 % — HIGH (ref 43–77)
NEUTROPHILS NFR BLD AUTO: 86.9 % — HIGH (ref 43–77)
NITRITE UR-MCNC: NEGATIVE — SIGNIFICANT CHANGE UP
NITRITE UR-MCNC: NEGATIVE — SIGNIFICANT CHANGE UP
NRBC # BLD: 0 /100 WBCS — SIGNIFICANT CHANGE UP (ref 0–0)
NRBC # BLD: 0 /100 WBCS — SIGNIFICANT CHANGE UP (ref 0–0)
PCO2 BLDA: 125 MMHG — CRITICAL HIGH (ref 35–48)
PCO2 BLDA: 125 MMHG — CRITICAL HIGH (ref 35–48)
PH BLDA: 7.17 — CRITICAL LOW (ref 7.35–7.45)
PH BLDA: 7.17 — CRITICAL LOW (ref 7.35–7.45)
PH UR: 6 — SIGNIFICANT CHANGE UP (ref 5–8)
PH UR: 6 — SIGNIFICANT CHANGE UP (ref 5–8)
PLATELET # BLD AUTO: 189 K/UL — SIGNIFICANT CHANGE UP (ref 150–400)
PLATELET # BLD AUTO: 189 K/UL — SIGNIFICANT CHANGE UP (ref 150–400)
PO2 BLDA: 104 MMHG — SIGNIFICANT CHANGE UP (ref 83–108)
PO2 BLDA: 104 MMHG — SIGNIFICANT CHANGE UP (ref 83–108)
POTASSIUM SERPL-MCNC: 5.3 MMOL/L — SIGNIFICANT CHANGE UP (ref 3.5–5.3)
POTASSIUM SERPL-MCNC: 5.3 MMOL/L — SIGNIFICANT CHANGE UP (ref 3.5–5.3)
POTASSIUM SERPL-SCNC: 5.3 MMOL/L — SIGNIFICANT CHANGE UP (ref 3.5–5.3)
POTASSIUM SERPL-SCNC: 5.3 MMOL/L — SIGNIFICANT CHANGE UP (ref 3.5–5.3)
PROT SERPL-MCNC: 6.8 G/DL — SIGNIFICANT CHANGE UP (ref 6–8.3)
PROT SERPL-MCNC: 6.8 G/DL — SIGNIFICANT CHANGE UP (ref 6–8.3)
PROT UR-MCNC: ABNORMAL MG/DL
PROT UR-MCNC: ABNORMAL MG/DL
PROTHROM AB SERPL-ACNC: 11 SEC — SIGNIFICANT CHANGE UP (ref 9.5–13)
PROTHROM AB SERPL-ACNC: 11 SEC — SIGNIFICANT CHANGE UP (ref 9.5–13)
RAPID RVP RESULT: SIGNIFICANT CHANGE UP
RAPID RVP RESULT: SIGNIFICANT CHANGE UP
RBC # BLD: 4.63 M/UL — SIGNIFICANT CHANGE UP (ref 4.2–5.8)
RBC # BLD: 4.63 M/UL — SIGNIFICANT CHANGE UP (ref 4.2–5.8)
RBC # FLD: 13.1 % — SIGNIFICANT CHANGE UP (ref 10.3–14.5)
RBC # FLD: 13.1 % — SIGNIFICANT CHANGE UP (ref 10.3–14.5)
SAO2 % BLDA: 97 % — SIGNIFICANT CHANGE UP
SAO2 % BLDA: 97 % — SIGNIFICANT CHANGE UP
SARS-COV-2 RNA SPEC QL NAA+PROBE: SIGNIFICANT CHANGE UP
SARS-COV-2 RNA SPEC QL NAA+PROBE: SIGNIFICANT CHANGE UP
SODIUM SERPL-SCNC: 133 MMOL/L — LOW (ref 135–145)
SODIUM SERPL-SCNC: 133 MMOL/L — LOW (ref 135–145)
SP GR SPEC: 1.03 — SIGNIFICANT CHANGE UP (ref 1–1.03)
SP GR SPEC: 1.03 — SIGNIFICANT CHANGE UP (ref 1–1.03)
TROPONIN I, HIGH SENSITIVITY RESULT: 62 NG/L — SIGNIFICANT CHANGE UP
TROPONIN I, HIGH SENSITIVITY RESULT: 62 NG/L — SIGNIFICANT CHANGE UP
UROBILINOGEN FLD QL: 0.2 MG/DL — SIGNIFICANT CHANGE UP (ref 0.2–1)
UROBILINOGEN FLD QL: 0.2 MG/DL — SIGNIFICANT CHANGE UP (ref 0.2–1)
WBC # BLD: 9.73 K/UL — SIGNIFICANT CHANGE UP (ref 3.8–10.5)
WBC # BLD: 9.73 K/UL — SIGNIFICANT CHANGE UP (ref 3.8–10.5)
WBC # FLD AUTO: 9.73 K/UL — SIGNIFICANT CHANGE UP (ref 3.8–10.5)
WBC # FLD AUTO: 9.73 K/UL — SIGNIFICANT CHANGE UP (ref 3.8–10.5)

## 2023-12-24 PROCEDURE — 99291 CRITICAL CARE FIRST HOUR: CPT | Mod: 25

## 2023-12-24 PROCEDURE — 71250 CT THORAX DX C-: CPT | Mod: 26,MA

## 2023-12-24 PROCEDURE — 71045 X-RAY EXAM CHEST 1 VIEW: CPT | Mod: 26,77

## 2023-12-24 PROCEDURE — 99291 CRITICAL CARE FIRST HOUR: CPT | Mod: GC

## 2023-12-24 PROCEDURE — 70450 CT HEAD/BRAIN W/O DYE: CPT | Mod: 26,MA

## 2023-12-24 PROCEDURE — 31500 INSERT EMERGENCY AIRWAY: CPT

## 2023-12-24 PROCEDURE — 71045 X-RAY EXAM CHEST 1 VIEW: CPT | Mod: 26

## 2023-12-24 RX ORDER — PROPOFOL 10 MG/ML
5 INJECTION, EMULSION INTRAVENOUS
Qty: 1000 | Refills: 0 | Status: DISCONTINUED | OUTPATIENT
Start: 2023-12-24 | End: 2023-12-25

## 2023-12-24 RX ORDER — CEFTRIAXONE 500 MG/1
1000 INJECTION, POWDER, FOR SOLUTION INTRAMUSCULAR; INTRAVENOUS EVERY 24 HOURS
Refills: 0 | Status: DISCONTINUED | OUTPATIENT
Start: 2023-12-24 | End: 2023-12-26

## 2023-12-24 RX ORDER — FENTANYL CITRATE 50 UG/ML
50 INJECTION INTRAVENOUS ONCE
Refills: 0 | Status: DISCONTINUED | OUTPATIENT
Start: 2023-12-24 | End: 2023-12-24

## 2023-12-24 RX ORDER — PROPOFOL 10 MG/ML
50 INJECTION, EMULSION INTRAVENOUS ONCE
Refills: 0 | Status: COMPLETED | OUTPATIENT
Start: 2023-12-24 | End: 2023-12-24

## 2023-12-24 RX ORDER — NOREPINEPHRINE BITARTRATE/D5W 8 MG/250ML
0.05 PLASTIC BAG, INJECTION (ML) INTRAVENOUS
Qty: 8 | Refills: 0 | Status: DISCONTINUED | OUTPATIENT
Start: 2023-12-24 | End: 2023-12-25

## 2023-12-24 RX ORDER — AZITHROMYCIN 500 MG/1
500 TABLET, FILM COATED ORAL EVERY 24 HOURS
Refills: 0 | Status: DISCONTINUED | OUTPATIENT
Start: 2023-12-24 | End: 2023-12-26

## 2023-12-24 RX ORDER — SUCCINYLCHOLINE CHLORIDE 100 MG/5ML
100 SYRINGE (ML) INTRAVENOUS ONCE
Refills: 0 | Status: COMPLETED | OUTPATIENT
Start: 2023-12-24 | End: 2023-12-24

## 2023-12-24 RX ORDER — AZITHROMYCIN 500 MG/1
500 TABLET, FILM COATED ORAL ONCE
Refills: 0 | Status: COMPLETED | OUTPATIENT
Start: 2023-12-24 | End: 2023-12-24

## 2023-12-24 RX ORDER — ETOMIDATE 2 MG/ML
20 INJECTION INTRAVENOUS ONCE
Refills: 0 | Status: COMPLETED | OUTPATIENT
Start: 2023-12-24 | End: 2023-12-24

## 2023-12-24 RX ORDER — CHLORHEXIDINE GLUCONATE 213 G/1000ML
15 SOLUTION TOPICAL EVERY 12 HOURS
Refills: 0 | Status: DISCONTINUED | OUTPATIENT
Start: 2023-12-24 | End: 2023-12-26

## 2023-12-24 RX ORDER — CEFTRIAXONE 500 MG/1
1000 INJECTION, POWDER, FOR SOLUTION INTRAMUSCULAR; INTRAVENOUS ONCE
Refills: 0 | Status: COMPLETED | OUTPATIENT
Start: 2023-12-24 | End: 2023-12-24

## 2023-12-24 RX ADMIN — ETOMIDATE 20 MILLIGRAM(S): 2 INJECTION INTRAVENOUS at 22:49

## 2023-12-24 RX ADMIN — CEFTRIAXONE 100 MILLIGRAM(S): 500 INJECTION, POWDER, FOR SOLUTION INTRAMUSCULAR; INTRAVENOUS at 22:09

## 2023-12-24 RX ADMIN — AZITHROMYCIN 255 MILLIGRAM(S): 500 TABLET, FILM COATED ORAL at 22:07

## 2023-12-24 RX ADMIN — Medication 100 MILLIGRAM(S): at 22:49

## 2023-12-24 RX ADMIN — FENTANYL CITRATE 50 MICROGRAM(S): 50 INJECTION INTRAVENOUS at 23:36

## 2023-12-24 RX ADMIN — PROPOFOL 2.73 MICROGRAM(S)/KG/MIN: 10 INJECTION, EMULSION INTRAVENOUS at 22:27

## 2023-12-24 RX ADMIN — Medication 8.53 MICROGRAM(S)/KG/MIN: at 22:27

## 2023-12-24 NOTE — ED ADULT NURSE NOTE - NS ED NURSE LEVEL OF CONSCIOUSNESS SPEECH
Robotic Gastric bypass Procedure Note    Date of procedure:   05/01/2023    Indications: Inability to maintain weight loss    Pre-operative Diagnosis:    1. Morbid obesity          2. BMI 36.0-36.9,adult          3. High cholesterol          4. ADELIA (obstructive sleep apnea)              Post-operative Diagnosis: Same    Surgeon: Paresh Sampson MD    Assistants: Elham Smallwood MD    Anesthesia: General endotracheal anesthesia    ASA Class: 3    Procedure Details   The patient was seen in the Holding Room. The risks, benefits, complications, treatment options, and expected outcomes were discussed with the patient. The possibilities of reaction to medication, pulmonary aspiration, perforation of viscus, bleeding, recurrent infection, the need for additional procedures, failure to diagnose a condition, and creating a complication requiring transfusion or operation were discussed with the patient. The patient concurred with the proposed plan, giving informed consent. The site of surgery properly noted/marked. The patient was taken to Operating Room 5 identified as Yissell[place] SOLIS Cindy and the procedure verified as Laparoscopic/Robotic Gastric Bypass. A Time Out was held and the above information confirmed.    Full general anesthesia was induced with orotracheal intubation. The patient was prepped and draped in a supine position. Appropriate antibiotics were given intravenously. Arms were out.    The abdomen was entered using a veress needle technique.  Once safely entered, CO2 insufflation was started and maintained at 15 mm hg.  Trocars were then inserted.  The patient was placed in reverse Trendelenburg.  The robot was then docked to the patient.    The procedure was started by identifying the entire gastric body.  With slight retraction of the liver, a perigastric dissection was undertaken 5 cm from the GE junction.  Multiple adhesions from the previous lap band were appreciated.  Time was taken to  remove these adhesions and freeing the liver.  A blue load stapler was then used to fashion a gastric pouch.  This was done by 1st stapling across the stomach and then up to the left crura.  Some crural dissection was required to appropriately disconnect the stomach completely.  The gastric remnant staple line was oversewn as the tissue integrity was weak from the previous band.    The omentum was placed above the colon and divided in half.  The ligament of treitz was identified, an Omega loop was created, and the bowel was run approximately 60 cm.  Bringing the bowel up to the newly formed gastric pouch, a 2 layered end-to-side hand-sewn anastomosis was created between the distal gastric pouch and Omega loop of small bowel.  This was done with absorbable Vicryl sutures and over a 36 [place] bougie.    The proximal bowel was then transected using a white load.  The distal bowel was was run another 120 cm and and a side to side jejunojejunostomy was created with a white load.  The enterotomies were closed with a running yicryl and mesentery closed with a nonabsorbable V lock.      Provacative leak test was negative.  tiseel was sprayed on the anastomosis for hemostasis.    Retro karlie defect was closed with non absorbable v lock.    Abdomen was inspected for an abnormalities, none were seen    Instrument, sponge, and needle counts were correct prior to wound closure and at the conclusion of the case.     Findings:  adhesions from lap band    Estimated Blood Loss: 50.0 cc    Drains: none    Total IV Fluids: see anesthesia ml    Specimens: none    Implants: none    Complications:  None; patient tolerated the procedure well.    Disposition: PACU - hemodynamically stable.    Condition: stable    Attending Attestation: I was present and scrubbed for the entire procedure.       Unable to speak

## 2023-12-24 NOTE — ED PROVIDER NOTE - OBJECTIVE STATEMENT
63 y.o presenting for sob today. bibems from assisted living. patient on arrival aox0. ems state facilty staff ensure on baseline.

## 2023-12-24 NOTE — ED ADULT NURSE NOTE - OBJECTIVE STATEMENT
patient BIBA as notification. pt BIBA for SOB and AMS. patient on 100% NRB. patient unresponsive. b/l lung sounds clear upon auscultation.  abdominal distention noted.

## 2023-12-24 NOTE — ED PROVIDER NOTE - CLINICAL SUMMARY MEDICAL DECISION MAKING FREE TEXT BOX
Patient presenting for respiratory distress. noting hypercapnia on abg, intubated in ed. admitted to icu. clinically stable during ed stay

## 2023-12-24 NOTE — ED PROVIDER NOTE - NS ED ATTENDING STATEMENT MOD
normal... I have personally provided the amount of critical care time documented below excluding time spent on separate procedures.

## 2023-12-24 NOTE — ED ADULT NURSE NOTE - NSFALLUNIVINTERV_ED_ALL_ED
Bed/Stretcher in lowest position, wheels locked, appropriate side rails in place/Call bell, personal items and telephone in reach/Instruct patient to call for assistance before getting out of bed/chair/stretcher/Non-slip footwear applied when patient is off stretcher/[place] to call system/Physically safe environment - no spills, clutter or unnecessary equipment/Purposeful proactive rounding/Room/bathroom lighting operational, light cord in reach Bed/Stretcher in lowest position, wheels locked, appropriate side rails in place/Call bell, personal items and telephone in reach/Instruct patient to call for assistance before getting out of bed/chair/stretcher/Non-slip footwear applied when patient is off stretcher/[place] to call system/Physically safe environment - no spills, clutter or unnecessary equipment/Purposeful proactive rounding/Room/bathroom lighting operational, light cord in reach

## 2023-12-25 LAB
ALBUMIN SERPL ELPH-MCNC: 2.8 G/DL — LOW (ref 3.5–5)
ALBUMIN SERPL ELPH-MCNC: 2.8 G/DL — LOW (ref 3.5–5)
ALP SERPL-CCNC: 44 U/L — SIGNIFICANT CHANGE UP (ref 40–120)
ALP SERPL-CCNC: 44 U/L — SIGNIFICANT CHANGE UP (ref 40–120)
ALT FLD-CCNC: 26 U/L DA — SIGNIFICANT CHANGE UP (ref 10–60)
ALT FLD-CCNC: 26 U/L DA — SIGNIFICANT CHANGE UP (ref 10–60)
ANION GAP SERPL CALC-SCNC: 2 MMOL/L — LOW (ref 5–17)
ANION GAP SERPL CALC-SCNC: 2 MMOL/L — LOW (ref 5–17)
AST SERPL-CCNC: 10 U/L — SIGNIFICANT CHANGE UP (ref 10–40)
AST SERPL-CCNC: 10 U/L — SIGNIFICANT CHANGE UP (ref 10–40)
BASE EXCESS BLDA CALC-SCNC: 16.6 MMOL/L — HIGH (ref -2–3)
BASE EXCESS BLDA CALC-SCNC: 16.6 MMOL/L — HIGH (ref -2–3)
BASE EXCESS BLDA CALC-SCNC: 20.4 MMOL/L — HIGH (ref -2–3)
BASE EXCESS BLDA CALC-SCNC: 20.4 MMOL/L — HIGH (ref -2–3)
BILIRUB SERPL-MCNC: 0.2 MG/DL — SIGNIFICANT CHANGE UP (ref 0.2–1.2)
BILIRUB SERPL-MCNC: 0.2 MG/DL — SIGNIFICANT CHANGE UP (ref 0.2–1.2)
BLOOD GAS COMMENTS ARTERIAL: SIGNIFICANT CHANGE UP
BUN SERPL-MCNC: 20 MG/DL — HIGH (ref 7–18)
BUN SERPL-MCNC: 20 MG/DL — HIGH (ref 7–18)
CALCIUM SERPL-MCNC: 8 MG/DL — LOW (ref 8.4–10.5)
CALCIUM SERPL-MCNC: 8 MG/DL — LOW (ref 8.4–10.5)
CHLORIDE SERPL-SCNC: 95 MMOL/L — LOW (ref 96–108)
CHLORIDE SERPL-SCNC: 95 MMOL/L — LOW (ref 96–108)
CO2 SERPL-SCNC: 41 MMOL/L — HIGH (ref 22–31)
CO2 SERPL-SCNC: 41 MMOL/L — HIGH (ref 22–31)
CREAT SERPL-MCNC: 0.67 MG/DL — SIGNIFICANT CHANGE UP (ref 0.5–1.3)
CREAT SERPL-MCNC: 0.67 MG/DL — SIGNIFICANT CHANGE UP (ref 0.5–1.3)
CULTURE RESULTS: NO GROWTH — SIGNIFICANT CHANGE UP
CULTURE RESULTS: NO GROWTH — SIGNIFICANT CHANGE UP
EGFR: 106 ML/MIN/1.73M2 — SIGNIFICANT CHANGE UP
EGFR: 106 ML/MIN/1.73M2 — SIGNIFICANT CHANGE UP
GAS PNL BLDA: SIGNIFICANT CHANGE UP
GAS PNL BLDA: SIGNIFICANT CHANGE UP
GLUCOSE SERPL-MCNC: 230 MG/DL — HIGH (ref 70–99)
GLUCOSE SERPL-MCNC: 230 MG/DL — HIGH (ref 70–99)
HCO3 BLDA-SCNC: 45 MMOL/L — CRITICAL HIGH (ref 21–28)
HCO3 BLDA-SCNC: 45 MMOL/L — CRITICAL HIGH (ref 21–28)
HCO3 BLDA-SCNC: 49 MMOL/L — CRITICAL HIGH (ref 21–28)
HCO3 BLDA-SCNC: 49 MMOL/L — CRITICAL HIGH (ref 21–28)
HCT VFR BLD CALC: 38.4 % — LOW (ref 39–50)
HCT VFR BLD CALC: 38.4 % — LOW (ref 39–50)
HGB BLD-MCNC: 11.4 G/DL — LOW (ref 13–17)
HGB BLD-MCNC: 11.4 G/DL — LOW (ref 13–17)
HOROWITZ INDEX BLDA+IHG-RTO: 40 — SIGNIFICANT CHANGE UP
MAGNESIUM SERPL-MCNC: 1.7 MG/DL — SIGNIFICANT CHANGE UP (ref 1.6–2.6)
MAGNESIUM SERPL-MCNC: 1.7 MG/DL — SIGNIFICANT CHANGE UP (ref 1.6–2.6)
MCHC RBC-ENTMCNC: 27.7 PG — SIGNIFICANT CHANGE UP (ref 27–34)
MCHC RBC-ENTMCNC: 27.7 PG — SIGNIFICANT CHANGE UP (ref 27–34)
MCHC RBC-ENTMCNC: 29.7 GM/DL — LOW (ref 32–36)
MCHC RBC-ENTMCNC: 29.7 GM/DL — LOW (ref 32–36)
MCV RBC AUTO: 93.2 FL — SIGNIFICANT CHANGE UP (ref 80–100)
MCV RBC AUTO: 93.2 FL — SIGNIFICANT CHANGE UP (ref 80–100)
MRSA PCR RESULT.: SIGNIFICANT CHANGE UP
MRSA PCR RESULT.: SIGNIFICANT CHANGE UP
NRBC # BLD: 0 /100 WBCS — SIGNIFICANT CHANGE UP (ref 0–0)
NRBC # BLD: 0 /100 WBCS — SIGNIFICANT CHANGE UP (ref 0–0)
PCO2 BLDA: 70 MMHG — CRITICAL HIGH (ref 35–48)
PCO2 BLDA: 70 MMHG — CRITICAL HIGH (ref 35–48)
PCO2 BLDA: 71 MMHG — CRITICAL HIGH (ref 35–48)
PCO2 BLDA: 71 MMHG — CRITICAL HIGH (ref 35–48)
PH BLDA: 7.41 — SIGNIFICANT CHANGE UP (ref 7.35–7.45)
PH BLDA: 7.41 — SIGNIFICANT CHANGE UP (ref 7.35–7.45)
PH BLDA: 7.45 — SIGNIFICANT CHANGE UP (ref 7.35–7.45)
PH BLDA: 7.45 — SIGNIFICANT CHANGE UP (ref 7.35–7.45)
PHOSPHATE SERPL-MCNC: 2.5 MG/DL — SIGNIFICANT CHANGE UP (ref 2.5–4.5)
PHOSPHATE SERPL-MCNC: 2.5 MG/DL — SIGNIFICANT CHANGE UP (ref 2.5–4.5)
PLATELET # BLD AUTO: 174 K/UL — SIGNIFICANT CHANGE UP (ref 150–400)
PLATELET # BLD AUTO: 174 K/UL — SIGNIFICANT CHANGE UP (ref 150–400)
PO2 BLDA: 229 MMHG — HIGH (ref 83–108)
PO2 BLDA: 229 MMHG — HIGH (ref 83–108)
PO2 BLDA: 80 MMHG — LOW (ref 83–108)
PO2 BLDA: 80 MMHG — LOW (ref 83–108)
POTASSIUM SERPL-MCNC: 4.3 MMOL/L — SIGNIFICANT CHANGE UP (ref 3.5–5.3)
POTASSIUM SERPL-MCNC: 4.3 MMOL/L — SIGNIFICANT CHANGE UP (ref 3.5–5.3)
POTASSIUM SERPL-SCNC: 4.3 MMOL/L — SIGNIFICANT CHANGE UP (ref 3.5–5.3)
POTASSIUM SERPL-SCNC: 4.3 MMOL/L — SIGNIFICANT CHANGE UP (ref 3.5–5.3)
PROT SERPL-MCNC: 5.6 G/DL — LOW (ref 6–8.3)
PROT SERPL-MCNC: 5.6 G/DL — LOW (ref 6–8.3)
RBC # BLD: 4.12 M/UL — LOW (ref 4.2–5.8)
RBC # BLD: 4.12 M/UL — LOW (ref 4.2–5.8)
RBC # FLD: 13.2 % — SIGNIFICANT CHANGE UP (ref 10.3–14.5)
RBC # FLD: 13.2 % — SIGNIFICANT CHANGE UP (ref 10.3–14.5)
S AUREUS DNA NOSE QL NAA+PROBE: SIGNIFICANT CHANGE UP
S AUREUS DNA NOSE QL NAA+PROBE: SIGNIFICANT CHANGE UP
SAO2 % BLDA: 100 % — SIGNIFICANT CHANGE UP
SAO2 % BLDA: 100 % — SIGNIFICANT CHANGE UP
SAO2 % BLDA: 98 % — SIGNIFICANT CHANGE UP
SAO2 % BLDA: 98 % — SIGNIFICANT CHANGE UP
SODIUM SERPL-SCNC: 138 MMOL/L — SIGNIFICANT CHANGE UP (ref 135–145)
SODIUM SERPL-SCNC: 138 MMOL/L — SIGNIFICANT CHANGE UP (ref 135–145)
SPECIMEN SOURCE: SIGNIFICANT CHANGE UP
SPECIMEN SOURCE: SIGNIFICANT CHANGE UP
WBC # BLD: 10.75 K/UL — HIGH (ref 3.8–10.5)
WBC # BLD: 10.75 K/UL — HIGH (ref 3.8–10.5)
WBC # FLD AUTO: 10.75 K/UL — HIGH (ref 3.8–10.5)
WBC # FLD AUTO: 10.75 K/UL — HIGH (ref 3.8–10.5)

## 2023-12-25 RX ORDER — INSULIN NPH HUM/REG INSULIN HM 70-30/ML
38 VIAL (ML) SUBCUTANEOUS
Refills: 0 | DISCHARGE

## 2023-12-25 RX ORDER — ALBUTEROL 90 UG/1
3 AEROSOL, METERED ORAL
Refills: 0 | DISCHARGE

## 2023-12-25 RX ORDER — TRAZODONE HCL 50 MG
1 TABLET ORAL
Refills: 0 | DISCHARGE

## 2023-12-25 RX ORDER — BUDESONIDE AND FORMOTEROL FUMARATE DIHYDRATE 160; 4.5 UG/1; UG/1
2 AEROSOL RESPIRATORY (INHALATION)
Refills: 0 | Status: COMPLETED | OUTPATIENT
Start: 2023-12-25 | End: 2024-11-22

## 2023-12-25 RX ORDER — INSULIN LISPRO 100/ML
VIAL (ML) SUBCUTANEOUS
Refills: 0 | Status: DISCONTINUED | OUTPATIENT
Start: 2023-12-25 | End: 2023-12-25

## 2023-12-25 RX ORDER — ALBUTEROL 90 UG/1
2 AEROSOL, METERED ORAL EVERY 6 HOURS
Refills: 0 | Status: DISCONTINUED | OUTPATIENT
Start: 2023-12-25 | End: 2023-12-25

## 2023-12-25 RX ORDER — HEPARIN SODIUM 5000 [USP'U]/ML
5000 INJECTION INTRAVENOUS; SUBCUTANEOUS EVERY 8 HOURS
Refills: 0 | Status: DISCONTINUED | OUTPATIENT
Start: 2023-12-25 | End: 2024-01-02

## 2023-12-25 RX ORDER — INSULIN NPH HUM/REG INSULIN HM 70-30/ML
34 VIAL (ML) SUBCUTANEOUS
Refills: 0 | DISCHARGE

## 2023-12-25 RX ORDER — SODIUM CHLORIDE 0.65 %
2 AEROSOL, SPRAY (ML) NASAL
Refills: 0 | DISCHARGE

## 2023-12-25 RX ORDER — TAMSULOSIN HYDROCHLORIDE 0.4 MG/1
1 CAPSULE ORAL
Refills: 0 | DISCHARGE

## 2023-12-25 RX ORDER — DEXMEDETOMIDINE HYDROCHLORIDE IN 0.9% SODIUM CHLORIDE 4 UG/ML
1 INJECTION INTRAVENOUS
Qty: 400 | Refills: 0 | Status: DISCONTINUED | OUTPATIENT
Start: 2023-12-25 | End: 2023-12-26

## 2023-12-25 RX ORDER — PANTOPRAZOLE SODIUM 20 MG/1
40 TABLET, DELAYED RELEASE ORAL DAILY
Refills: 0 | Status: DISCONTINUED | OUTPATIENT
Start: 2023-12-25 | End: 2023-12-28

## 2023-12-25 RX ORDER — FINASTERIDE 5 MG/1
1 TABLET, FILM COATED ORAL
Refills: 0 | DISCHARGE

## 2023-12-25 RX ORDER — GABAPENTIN 400 MG/1
1 CAPSULE ORAL
Refills: 0 | DISCHARGE

## 2023-12-25 RX ORDER — PANTOPRAZOLE SODIUM 20 MG/1
1 TABLET, DELAYED RELEASE ORAL
Refills: 0 | DISCHARGE

## 2023-12-25 RX ORDER — TIOTROPIUM BROMIDE 18 UG/1
2 CAPSULE ORAL; RESPIRATORY (INHALATION) DAILY
Refills: 0 | Status: DISCONTINUED | OUTPATIENT
Start: 2023-12-25 | End: 2023-12-25

## 2023-12-25 RX ORDER — FENTANYL CITRATE 50 UG/ML
25 INJECTION INTRAVENOUS ONCE
Refills: 0 | Status: DISCONTINUED | OUTPATIENT
Start: 2023-12-25 | End: 2023-12-25

## 2023-12-25 RX ORDER — ATORVASTATIN CALCIUM 80 MG/1
1 TABLET, FILM COATED ORAL
Refills: 0 | DISCHARGE

## 2023-12-25 RX ORDER — SODIUM CHLORIDE 9 MG/ML
1000 INJECTION, SOLUTION INTRAVENOUS ONCE
Refills: 0 | Status: COMPLETED | OUTPATIENT
Start: 2023-12-25 | End: 2023-12-25

## 2023-12-25 RX ORDER — ACETAMINOPHEN 500 MG
1000 TABLET ORAL ONCE
Refills: 0 | Status: COMPLETED | OUTPATIENT
Start: 2023-12-25 | End: 2023-12-25

## 2023-12-25 RX ORDER — MAGNESIUM OXIDE 400 MG ORAL TABLET 241.3 MG
1 TABLET ORAL
Refills: 0 | DISCHARGE

## 2023-12-25 RX ORDER — SODIUM CHLORIDE 9 MG/ML
1000 INJECTION, SOLUTION INTRAVENOUS
Refills: 0 | Status: DISCONTINUED | OUTPATIENT
Start: 2023-12-25 | End: 2023-12-26

## 2023-12-25 RX ORDER — IPRATROPIUM/ALBUTEROL SULFATE 18-103MCG
3 AEROSOL WITH ADAPTER (GRAM) INHALATION EVERY 6 HOURS
Refills: 0 | Status: DISCONTINUED | OUTPATIENT
Start: 2023-12-25 | End: 2024-01-02

## 2023-12-25 RX ORDER — SODIUM CHLORIDE 9 MG/ML
1000 INJECTION, SOLUTION INTRAVENOUS
Refills: 0 | Status: DISCONTINUED | OUTPATIENT
Start: 2023-12-25 | End: 2023-12-25

## 2023-12-25 RX ORDER — ROFLUMILAST 500 UG/1
1 TABLET ORAL
Refills: 0 | DISCHARGE

## 2023-12-25 RX ORDER — ASPIRIN/CALCIUM CARB/MAGNESIUM 324 MG
1 TABLET ORAL
Refills: 0 | DISCHARGE

## 2023-12-25 RX ORDER — OLANZAPINE 15 MG/1
1 TABLET, FILM COATED ORAL
Refills: 0 | DISCHARGE

## 2023-12-25 RX ORDER — ALBUTEROL 90 UG/1
2 AEROSOL, METERED ORAL
Refills: 0 | DISCHARGE

## 2023-12-25 RX ORDER — LANOLIN ALCOHOL/MO/W.PET/CERES
2 CREAM (GRAM) TOPICAL
Refills: 0 | DISCHARGE

## 2023-12-25 RX ORDER — INSULIN LISPRO 100/ML
VIAL (ML) SUBCUTANEOUS EVERY 6 HOURS
Refills: 0 | Status: DISCONTINUED | OUTPATIENT
Start: 2023-12-25 | End: 2023-12-26

## 2023-12-25 RX ORDER — LATANOPROST 0.05 MG/ML
1 SOLUTION/ DROPS OPHTHALMIC; TOPICAL
Refills: 0 | DISCHARGE

## 2023-12-25 RX ORDER — FENTANYL CITRATE 50 UG/ML
25 INJECTION INTRAVENOUS EVERY 4 HOURS
Refills: 0 | Status: DISCONTINUED | OUTPATIENT
Start: 2023-12-25 | End: 2023-12-26

## 2023-12-25 RX ORDER — ACETAMINOPHEN 500 MG
2 TABLET ORAL
Refills: 0 | DISCHARGE

## 2023-12-25 RX ORDER — OXYCODONE AND ACETAMINOPHEN 5; 325 MG/1; MG/1
1 TABLET ORAL
Refills: 0 | DISCHARGE

## 2023-12-25 RX ADMIN — CHLORHEXIDINE GLUCONATE 15 MILLILITER(S): 213 SOLUTION TOPICAL at 17:20

## 2023-12-25 RX ADMIN — Medication 40 MILLIGRAM(S): at 17:20

## 2023-12-25 RX ADMIN — CHLORHEXIDINE GLUCONATE 15 MILLILITER(S): 213 SOLUTION TOPICAL at 05:32

## 2023-12-25 RX ADMIN — DEXMEDETOMIDINE HYDROCHLORIDE IN 0.9% SODIUM CHLORIDE 22.2 MICROGRAM(S)/KG/HR: 4 INJECTION INTRAVENOUS at 13:11

## 2023-12-25 RX ADMIN — Medication 3 MILLILITER(S): at 20:03

## 2023-12-25 RX ADMIN — FENTANYL CITRATE 25 MICROGRAM(S): 50 INJECTION INTRAVENOUS at 11:37

## 2023-12-25 RX ADMIN — PROPOFOL 2.73 MICROGRAM(S)/KG/MIN: 10 INJECTION, EMULSION INTRAVENOUS at 02:12

## 2023-12-25 RX ADMIN — Medication 40 MILLIGRAM(S): at 11:21

## 2023-12-25 RX ADMIN — PROPOFOL 2.73 MICROGRAM(S)/KG/MIN: 10 INJECTION, EMULSION INTRAVENOUS at 11:03

## 2023-12-25 RX ADMIN — AZITHROMYCIN 255 MILLIGRAM(S): 500 TABLET, FILM COATED ORAL at 05:31

## 2023-12-25 RX ADMIN — HEPARIN SODIUM 5000 UNIT(S): 5000 INJECTION INTRAVENOUS; SUBCUTANEOUS at 05:33

## 2023-12-25 RX ADMIN — FENTANYL CITRATE 25 MICROGRAM(S): 50 INJECTION INTRAVENOUS at 19:08

## 2023-12-25 RX ADMIN — DEXMEDETOMIDINE HYDROCHLORIDE IN 0.9% SODIUM CHLORIDE 22.2 MICROGRAM(S)/KG/HR: 4 INJECTION INTRAVENOUS at 19:09

## 2023-12-25 RX ADMIN — HEPARIN SODIUM 5000 UNIT(S): 5000 INJECTION INTRAVENOUS; SUBCUTANEOUS at 21:13

## 2023-12-25 RX ADMIN — CEFTRIAXONE 100 MILLIGRAM(S): 500 INJECTION, POWDER, FOR SOLUTION INTRAMUSCULAR; INTRAVENOUS at 05:31

## 2023-12-25 RX ADMIN — FENTANYL CITRATE 25 MICROGRAM(S): 50 INJECTION INTRAVENOUS at 19:27

## 2023-12-25 RX ADMIN — SODIUM CHLORIDE 1000 MILLILITER(S): 9 INJECTION, SOLUTION INTRAVENOUS at 05:53

## 2023-12-25 RX ADMIN — SODIUM CHLORIDE 60 MILLILITER(S): 9 INJECTION, SOLUTION INTRAVENOUS at 02:11

## 2023-12-25 RX ADMIN — FENTANYL CITRATE 25 MICROGRAM(S): 50 INJECTION INTRAVENOUS at 15:15

## 2023-12-25 RX ADMIN — FENTANYL CITRATE 50 MICROGRAM(S): 50 INJECTION INTRAVENOUS at 00:39

## 2023-12-25 RX ADMIN — Medication 3 MILLILITER(S): at 03:34

## 2023-12-25 RX ADMIN — FENTANYL CITRATE 25 MICROGRAM(S): 50 INJECTION INTRAVENOUS at 11:40

## 2023-12-25 RX ADMIN — PANTOPRAZOLE SODIUM 40 MILLIGRAM(S): 20 TABLET, DELAYED RELEASE ORAL at 11:20

## 2023-12-25 RX ADMIN — HEPARIN SODIUM 5000 UNIT(S): 5000 INJECTION INTRAVENOUS; SUBCUTANEOUS at 13:06

## 2023-12-25 RX ADMIN — Medication 6: at 23:52

## 2023-12-25 RX ADMIN — Medication 40 MILLIGRAM(S): at 05:33

## 2023-12-25 RX ADMIN — SODIUM CHLORIDE 60 MILLILITER(S): 9 INJECTION, SOLUTION INTRAVENOUS at 17:30

## 2023-12-25 RX ADMIN — Medication 400 MILLIGRAM(S): at 09:08

## 2023-12-25 RX ADMIN — Medication 40 MILLIGRAM(S): at 23:38

## 2023-12-25 RX ADMIN — Medication 2: at 05:57

## 2023-12-25 RX ADMIN — DEXMEDETOMIDINE HYDROCHLORIDE IN 0.9% SODIUM CHLORIDE 22.2 MICROGRAM(S)/KG/HR: 4 INJECTION INTRAVENOUS at 17:21

## 2023-12-25 RX ADMIN — Medication 1000 MILLIGRAM(S): at 09:57

## 2023-12-25 RX ADMIN — FENTANYL CITRATE 25 MICROGRAM(S): 50 INJECTION INTRAVENOUS at 15:11

## 2023-12-25 RX ADMIN — DEXMEDETOMIDINE HYDROCHLORIDE IN 0.9% SODIUM CHLORIDE 22.2 MICROGRAM(S)/KG/HR: 4 INJECTION INTRAVENOUS at 22:39

## 2023-12-25 RX ADMIN — SODIUM CHLORIDE 1000 MILLILITER(S): 9 INJECTION, SOLUTION INTRAVENOUS at 00:56

## 2023-12-25 RX ADMIN — Medication 6: at 17:20

## 2023-12-25 NOTE — H&P ADULT - ASSESSMENT
Patient is a 64 yo male from [facility], active smoker, with a PMHx of CAD, CHF on nocturnal BIPAP, COPD ( MULTIPLE intubations in past on 3L of home O2), peripheral neuropathy, GERD, HTN, HLD, obesity, polyarthritis, Pulmonary HTN, TIA, Vit D Def, presents with hypoxemia from nursing facility and lethargy. Upon evaluation in ED, patient afebrile, BP of 126/58, tachycardic to 115bpm, hypoxic placed on 15L NRBM saturating 97%. ABG showing ph of 7.17, CO2 of 145. Patient noted to be lethargic and subsequently intubated. Physical examination noted for dry mucus membranes, mark in place immediately draining 2L clear urine. Lungs clear to auscultation. Labs significant for no leukocytosis, lactate 0.9, negative cardiac enzymes and UA negative for infection. CT head negative for any acute intracranial processes. CT chest showing patchy opacities in the right middle lobe and bilateral lower lobes with areas of consolidation suspicious for infectious etiology. EKG sinus tachycardia 115bpm with RBBB with no acute ischemic changes. Patient admitted to ICU for Acute on chronic hypoxic hypercapnic respiratory failure 2/2 Suspected COPD exacerbation.       #Acute encephalopathy   #Acute on chronic hypoxic hypercapnic respiratory failure 2/2 suspected COPD exacerbation   # Hx of COPD  #Hx of DM   # Chronic CHF   # Hx of CAD  # HTN  # HLD  # GERD     Neurological  # Acute encephalopathy  - patient noted to be lethargic upon arrival  - patient then subsequently intubated for airway protection  - noted to have C02 level of 145, CT head negative for any acute infarct, UA negative   - Acute encephalopathy can be 2/2 Acute on chronic CO2 retention   - will titrate vent settings  - continue with sedation with propofol   - continue Rocephin  and Azithromycin due to CT chest findings   - f/u urine and blood cultures   - repeat ABG post intubation     Pulmonary  # Acute on chronic hypoxic hypercapnic respiratory failure   - noted to have hx of COPD on Nocturnal bipap  - patient intubated in ED, due to lethargy   - can be 2/2 CO2 retention noted to be 145  - CT chest showing patchy opacities in right middle lobes and bilateral lobes   - f/u post intubation ABG   - continue with home nebulizers through vent   - continue with rocephin and azithromyocin  - continue with solumedrol     #Hx of COPD  - patient noted to be on symbicort  - will resume meds nebs via vent    - CT chest showing patchy opacities in right middle lobes and bilateral lobes   - f/u post intubation ABG   - continue with home nebulizers through vent   - continue with rocephin and azithromyocin  - continue with solumedrol     Cardiac  # HX of CAD   - patient noted to be on ASA at home   - can resume meds once NGT is in place  - troponin noted to be negative    #HTN   - patient noted to be on antihypertensives at home   - noted to be on propofol, patient is normotensive  - hold home anti hypertensives for now     #Chronic CHF   - patient noted to have hx of Chronic CHF  - last echo noted to show EF>70%  - will resume home medications    Renal  - no acute issues     Infectious disease  #Suspected PNA on CT chest    CT chest showing patchy opacities in right middle lobes and bilateral lobes   - f/u post intubation ABG   - continue with home nebulizers through vent   - continue with rocephin and azithromyocin  - continue with solumedrol   - f/u urine and blood cultures     Heme  - no acute issues    GI  - NPO    Endo     #HX of DM  - noted to be on lantus 30 units QHS  - on SSI  - q6hrs finger sticks  - if persistently high can resume lantus and titrate towards home dose    Skin  - no acute issues    Lines  - peripheral IV lines    Disposition   Patient accepted and transferred to ICU  Patient is a 64 yo male from [facility], active smoker, with a PMHx of CAD, CHF on nocturnal BIPAP, COPD ( MULTIPLE intubations in past on 3L of home O2), peripheral neuropathy, GERD, HTN, HLD, obesity, polyarthritis, Pulmonary HTN, TIA, Vit D Def, presents with hypoxemia from nursing facility and lethargy. Upon evaluation in ED, patient afebrile, BP of 126/58, tachycardic to 115bpm, hypoxic placed on 15L NRBM saturating 97%. ABG showing ph of 7.17, CO2 of 145. Patient noted to be lethargic and subsequently intubated. Physical examination noted for dry mucus membranes, mark in place immediately draining 2L clear urine. Lungs clear to auscultation. Labs significant for no leukocytosis, lactate 0.9, negative cardiac enzymes and UA negative for infection. CT head negative for any acute intracranial processes. CT chest showing patchy opacities in the right middle lobe and bilateral lower lobes with areas of consolidation suspicious for infectious etiology. EKG sinus tachycardia 115bpm with RBBB with no acute ischemic changes. Patient admitted to ICU for Acute on chronic hypoxic hypercapnic respiratory failure 2/2 Suspected COPD exacerbation.       #Acute encephalopathy   #Acute on chronic hypoxic hypercapnic respiratory failure 2/2 suspected COPD exacerbation   # Hx of COPD  #Hx of DM   # Chronic CHF   # Hx of CAD  # HTN  # HLD  # GERD     Neurological  # Acute encephalopathy  - patient noted to be lethargic upon arrival  - patient then subsequently intubated for airway protection  - noted to have C02 level of 145, CT head negative for any acute infarct, UA negative   - Acute encephalopathy can be 2/2 Acute on chronic CO2 retention   - will titrate vent settings  - continue with sedation with propofol   - continue Rocephin  and Azithromycin due to CT chest findings   - f/u urine and blood cultures   - repeat ABG post intubation     Pulmonary  # Acute on chronic hypoxic hypercapnic respiratory failure   - noted to have hx of COPD on Nocturnal bipap  - patient intubated in ED, due to lethargy   - can be 2/2 CO2 retention noted to be 145  - CT chest showing patchy opacities in right middle lobes and bilateral lobes   - f/u post intubation ABG   - continue with home nebulizers through vent   - continue with rocephin and azithromyocin  - continue with solumedrol     #Hx of COPD  - patient noted to be on symbicort  - will resume meds nebs via vent    - CT chest showing patchy opacities in right middle lobes and bilateral lobes   - f/u post intubation ABG   - continue with home nebulizers through vent   - continue with rocephin and azithromyocin  - continue with solumedrol     Cardiac  # HX of CAD   - patient noted to be on ASA at home   - can resume meds once NGT is in place  - troponin noted to be negative    #HTN   - patient noted to be on antihypertensives at home   - noted to be on propofol, patient is normotensive  - hold home anti hypertensives for now     #Chronic CHF   - patient noted to have hx of Chronic CHF  - last echo noted to show EF>70%  - will resume home medications    Renal  - no acute issues     Infectious disease  #Suspected PNA on CT chest    CT chest showing patchy opacities in right middle lobes and bilateral lobes   - f/u post intubation ABG   - continue with home nebulizers through vent   - continue with rocephin and azithromyocin  - continue with solumedrol   - f/u urine and blood cultures     Heme  - no acute issues    GI  - NPO    Endo     #HX of DM  - noted to be on lantus 30 units QHS  - on SSI  - q6hrs finger sticks  - if persistently high can resume lantus and titrate towards home dose    Skin  - no acute issues    Lines  - peripheral IV lines    Disposition   Patient accepted and transferred to ICU  Patient is a 62 yo male from [facility], active smoker, with a PMHx of CAD, CHF on nocturnal BIPAP, COPD ( MULTIPLE intubations in past on 3L of home O2), peripheral neuropathy, GERD, HTN, HLD, obesity, polyarthritis, Pulmonary HTN, TIA, Vit D Def, presents with hypoxemia from nursing facility and lethargy. Upon evaluation in ED, patient afebrile, BP of 126/58, tachycardic to 115bpm, hypoxic placed on 15L NRBM saturating 97%. ABG showing ph of 7.17, CO2 of 145. Patient noted to be lethargic and subsequently intubated. Physical examination noted for dry mucus membranes, mark in place immediately draining 2L clear urine. Lungs clear to auscultation. Labs significant for no leukocytosis, lactate 0.9, negative cardiac enzymes and UA negative for infection. CT head negative for any acute intracranial processes. CT chest showing patchy opacities in the right middle lobe and bilateral lower lobes with areas of consolidation suspicious for infectious etiology. EKG sinus tachycardia 115bpm with RBBB with no acute ischemic changes. Patient admitted to ICU for Acute on chronic hypoxic hypercapnic respiratory failure 2/2 Suspected COPD exacerbation.       #Acute encephalopathy   #Acute on chronic hypoxic hypercapnic respiratory failure 2/2 suspected COPD exacerbation   # Hx of COPD  #Hx of DM   # Chronic CHF   # Hx of CAD  # HTN  # HLD  # GERD   #Urinary retention     Neurological  # Acute encephalopathy  - patient noted to be lethargic upon arrival  - patient then subsequently intubated for airway protection  - noted to have C02 level of 145, CT head negative for any acute infarct, UA negative   - Acute encephalopathy can be 2/2 Acute on chronic CO2 retention   - will titrate vent settings  - continue with sedation with propofol   - continue Rocephin  and Azithromycin due to CT chest findings   - f/u urine and blood cultures   - repeat ABG post intubation     Pulmonary  # Acute on chronic hypoxic hypercapnic respiratory failure   - noted to have hx of COPD on Nocturnal bipap  - patient intubated in ED, due to lethargy   - can be 2/2 CO2 retention noted to be 145  - CT chest showing patchy opacities in right middle lobes and bilateral lobes   - f/u post intubation ABG   - continue with home nebulizers through vent   - continue with rocephin and azithromyocin  - continue with solumedrol     #Hx of COPD  - patient noted to be on symbicort  - will resume meds nebs via vent    - CT chest showing patchy opacities in right middle lobes and bilateral lobes   - f/u post intubation ABG   - continue with home nebulizers through vent   - continue with rocephin and azithromyocin  - continue with solumedrol     Cardiac  # HX of CAD   - patient noted to be on ASA at home   - can resume meds once NGT is in place  - troponin noted to be negative    #HTN   - patient noted to be on antihypertensives at home   - noted to be on propofol, patient is normotensive  - hold home anti hypertensives for now     #Chronic CHF   - patient noted to have hx of Chronic CHF  - last echo noted to show EF>70%  - will resume home medications    Renal  - no acute issues     Infectious disease  #Suspected PNA on CT chest    CT chest showing patchy opacities in right middle lobes and bilateral lobes   - f/u post intubation ABG   - continue with home nebulizers through vent   - continue with rocephin and azithromyocin  - continue with solumedrol   - f/u urine and blood cultures     Heme  - no acute issues    GI  - NPO    Endo     #HX of DM  - noted to be on lantus 30 units QHS  - on SSI  - q6hrs finger sticks  - if persistently high can resume lantus and titrate towards home dose      #Urinary retention   - patient noted to produce 2L of urine after mark was placed  - UA negative for infection   - f/u urine cultures    Skin  - no acute issues    Lines  - peripheral IV lines    Disposition   Patient accepted and transferred to ICU  Patient is a 64 yo male from [facility], active smoker, with a PMHx of CAD, CHF on nocturnal BIPAP, COPD ( MULTIPLE intubations in past on 3L of home O2), peripheral neuropathy, GERD, HTN, HLD, obesity, polyarthritis, Pulmonary HTN, TIA, Vit D Def, presents with hypoxemia from nursing facility and lethargy. Upon evaluation in ED, patient afebrile, BP of 126/58, tachycardic to 115bpm, hypoxic placed on 15L NRBM saturating 97%. ABG showing ph of 7.17, CO2 of 145. Patient noted to be lethargic and subsequently intubated. Physical examination noted for dry mucus membranes, mark in place immediately draining 2L clear urine. Lungs clear to auscultation. Labs significant for no leukocytosis, lactate 0.9, negative cardiac enzymes and UA negative for infection. CT head negative for any acute intracranial processes. CT chest showing patchy opacities in the right middle lobe and bilateral lower lobes with areas of consolidation suspicious for infectious etiology. EKG sinus tachycardia 115bpm with RBBB with no acute ischemic changes. Patient admitted to ICU for Acute on chronic hypoxic hypercapnic respiratory failure 2/2 Suspected COPD exacerbation.       #Acute encephalopathy   #Acute on chronic hypoxic hypercapnic respiratory failure 2/2 suspected COPD exacerbation   # Hx of COPD  #Hx of DM   # Chronic CHF   # Hx of CAD  # HTN  # HLD  # GERD   #Urinary retention     Neurological  # Acute encephalopathy  - patient noted to be lethargic upon arrival  - patient then subsequently intubated for airway protection  - noted to have C02 level of 145, CT head negative for any acute infarct, UA negative   - Acute encephalopathy can be 2/2 Acute on chronic CO2 retention   - will titrate vent settings  - continue with sedation with propofol   - continue Rocephin  and Azithromycin due to CT chest findings   - f/u urine and blood cultures   - repeat ABG post intubation     Pulmonary  # Acute on chronic hypoxic hypercapnic respiratory failure   - noted to have hx of COPD on Nocturnal bipap  - patient intubated in ED, due to lethargy   - can be 2/2 CO2 retention noted to be 145  - CT chest showing patchy opacities in right middle lobes and bilateral lobes   - f/u post intubation ABG   - continue with home nebulizers through vent   - continue with rocephin and azithromyocin  - continue with solumedrol     #Hx of COPD  - patient noted to be on symbicort  - will resume meds nebs via vent    - CT chest showing patchy opacities in right middle lobes and bilateral lobes   - f/u post intubation ABG   - continue with home nebulizers through vent   - continue with rocephin and azithromyocin  - continue with solumedrol     Cardiac  # HX of CAD   - patient noted to be on ASA at home   - can resume meds once NGT is in place  - troponin noted to be negative    #HTN   - patient noted to be on antihypertensives at home   - noted to be on propofol, patient is normotensive  - hold home anti hypertensives for now     #Chronic CHF   - patient noted to have hx of Chronic CHF  - last echo noted to show EF>70%  - will resume home medications    Renal  - no acute issues     Infectious disease  #Suspected PNA on CT chest    CT chest showing patchy opacities in right middle lobes and bilateral lobes   - f/u post intubation ABG   - continue with home nebulizers through vent   - continue with rocephin and azithromyocin  - continue with solumedrol   - f/u urine and blood cultures     Heme  - no acute issues    GI  - NPO    Endo     #HX of DM  - noted to be on lantus 30 units QHS  - on SSI  - q6hrs finger sticks  - if persistently high can resume lantus and titrate towards home dose      #Urinary retention   - patient noted to produce 2L of urine after mark was placed  - UA negative for infection   - f/u urine cultures    Skin  - no acute issues    Lines  - peripheral IV lines    Disposition   Patient accepted and transferred to ICU

## 2023-12-25 NOTE — H&P ADULT - NSHPPHYSICALEXAM_GEN_ALL_CORE
GENERAL: intubated and sedated  HEAD:  Atraumatic, Normocephalic  EYES: Pupils constricted and non reactive, dry mucus membranes   NECK: Supple  CHEST/LUNG: Course lung examination noted in bilateral lung fields   HEART: Regular rate and rhythm; No murmurs; gallops or rubs    ABDOMEN: Soft, tender and distended; Bowel sounds present; No guarding  : [place] catheter placed and immediately draining over 2L of clear urine   EXTREMITIES:  2+ Peripheral Pulses, No cyanosis or edema  PSYCH: unable to assess   NEUROLOGY: unable to assess   SKIN: No rashes or lesions

## 2023-12-25 NOTE — PATIENT PROFILE ADULT - FALL HARM RISK - HARM RISK INTERVENTIONS
Communicate Risk of Fall with Harm to all staff/Reinforce activity limits and safety measures with patient and family/Tailored Fall Risk Interventions/Bed in lowest position, wheels locked, appropriate side rails in place/Call bell, personal items and telephone in reach/Instruct patient to call for assistance before getting out of bed or chair/[place] to call system/Physically safe environment - no spills, clutter or unnecessary equipment/Purposeful Proactive Rounding Communicate Risk of Fall with Harm to all staff/Reinforce activity limits and safety measures with patient and family/Tailored Fall Risk Interventions/Bed in lowest position, wheels locked, appropriate side rails in place/Call bell, personal items and telephone in reach/Instruct patient to call for assistance before getting out of bed or chair/[place] to call system/Physically safe environment - no spills, clutter or unnecessary equipment/Purposeful Proactive Rounding

## 2023-12-25 NOTE — PROGRESS NOTE ADULT - SUBJECTIVE AND OBJECTIVE BOX
INTERVAL HPI/OVERNIGHT EVENTS:       PRESSORS: [ ] YES [ ] NO  WHICH:    ANTIBIOTICS:                  DATE STARTED:  ANTIBIOTICS:                  DATE STARTED:    Antimicrobial:  azithromycin  IVPB 500 milliGRAM(s) IV Intermittent every 24 hours  cefTRIAXone   IVPB 1000 milliGRAM(s) IV Intermittent every 24 hours    Cardiovascular:  norepinephrine Infusion 0.05 MICROgram(s)/kG/Min IV Continuous <Continuous>    Pulmonary:  albuterol    90 MICROgram(s) HFA Inhaler 2 Puff(s) Inhalation every 6 hours  albuterol/ipratropium for Nebulization 3 milliLiter(s) Nebulizer every 6 hours  budesonide 160 MICROgram(s)/formoterol 4.5 MICROgram(s) Inhaler 2 Puff(s) Inhalation two times a day  tiotropium 2.5 MICROgram(s) Inhaler 2 Puff(s) Inhalation daily    Hematalogic:  heparin   Injectable 5000 Unit(s) SubCutaneous every 8 hours    Other:  chlorhexidine 0.12% Liquid 15 milliLiter(s) Oral Mucosa every 12 hours  insulin lispro (ADMELOG) corrective regimen sliding scale   SubCutaneous three times a day before meals  lactated ringers. 1000 milliLiter(s) IV Continuous <Continuous>  methylPREDNISolone sodium succinate Injectable 40 milliGRAM(s) IV Push every 6 hours  propofol Infusion 5 MICROgram(s)/kG/Min IV Continuous <Continuous>    albuterol    90 MICROgram(s) HFA Inhaler 2 Puff(s) Inhalation every 6 hours  albuterol/ipratropium for Nebulization 3 milliLiter(s) Nebulizer every 6 hours  azithromycin  IVPB 500 milliGRAM(s) IV Intermittent every 24 hours  budesonide 160 MICROgram(s)/formoterol 4.5 MICROgram(s) Inhaler 2 Puff(s) Inhalation two times a day  cefTRIAXone   IVPB 1000 milliGRAM(s) IV Intermittent every 24 hours  chlorhexidine 0.12% Liquid 15 milliLiter(s) Oral Mucosa every 12 hours  heparin   Injectable 5000 Unit(s) SubCutaneous every 8 hours  insulin lispro (ADMELOG) corrective regimen sliding scale   SubCutaneous three times a day before meals  lactated ringers. 1000 milliLiter(s) IV Continuous <Continuous>  methylPREDNISolone sodium succinate Injectable 40 milliGRAM(s) IV Push every 6 hours  norepinephrine Infusion 0.05 MICROgram(s)/kG/Min IV Continuous <Continuous>  propofol Infusion 5 MICROgram(s)/kG/Min IV Continuous <Continuous>  tiotropium 2.5 MICROgram(s) Inhaler 2 Puff(s) Inhalation daily    Drug Dosing Weight  Height (cm): 185.4 (06 Oct 2023 18:01)  Weight (kg): 88.7 (25 Dec 2023 07:35)  BMI (kg/m2): 25.8 (25 Dec 2023 07:35)  BSA (m2): 2.13 (25 Dec 2023 07:35)    PHYSICAL EXAM:  GENERAL: NAD  EYES: EOMI, PERRLA  NECK: Supple, No JVD; Trachea midline: No LAD   NERVOUS SYSTEM:  Alert & Oriented X3,  Motor Strength 5/5 B/L upper and lower extremities  CHEST/LUNG:  breath sounds present bilaterally, No rales, rhonchi, wheezing  HEART: Regular rate and rhythm; No murmurs, rubs, or gallops  ABDOMEN: Soft, Nontender, Nondistended; Bowel sounds present, no pain or masses on palpation  : voiding well, Amor in place  EXTREMITIES:  2+ Peripheral Pulses, No clubbing, cyanosis, or edema  SKIN: warm, intact, no lesions     LINES/DRAINS/DEVICES  CENTRAL LINE: [ ] YES [ ] NO  LOCATION:     AMOR: [ ] YES [ ] NO     A-LINE:  [ ] YES [ ] NO  LOCATION:       ICU Vital Signs Last 24 Hrs  T(C): 38.1 (25 Dec 2023 07:15), Max: 38.1 (25 Dec 2023 06:40)  T(F): 100.6 (25 Dec 2023 07:15), Max: 100.6 (25 Dec 2023 06:40)  HR: 92 (25 Dec 2023 07:15) (64 - 120)  BP: 87/69 (25 Dec 2023 07:15) (71/55 - 126/58)  BP(mean): 76 (25 Dec 2023 07:15) (62 - 94)  ABP: --  ABP(mean): --  RR: 25 (25 Dec 2023 07:15) (12 - 26)  SpO2: 98% (25 Dec 2023 07:15) (96% - 100%)    O2 Parameters below as of 25 Dec 2023 07:15  Patient On (Oxygen Delivery Method): ventilator    O2 Concentration (%): 40        ABG - ( 25 Dec 2023 03:48 )  pH, Arterial: 7.41  pH, Blood: x     /  pCO2: 71    /  pO2: 229   / HCO3: 45    / Base Excess: 16.6  /  SaO2: 100                   12-24 @ 07:01  -  12-25 @ 07:00  --------------------------------------------------------  IN: 2510.2 mL / OUT: 310 mL / NET: 2200.2 mL        Mode: AC/ CMV (Assist Control/ Continuous Mandatory Ventilation)  RR (machine): 25  TV (machine): 400  FiO2: 40  PEEP: 5  ITime: 0.9  MAP: 11  PIP: 25        LABS:  CBC Full  -  ( 25 Dec 2023 04:00 )  WBC Count : 10.75 K/uL  RBC Count : 4.12 M/uL  Hemoglobin : 11.4 g/dL  Hematocrit : 38.4 %  Platelet Count - Automated : 174 K/uL  Mean Cell Volume : 93.2 fl  Mean Cell Hemoglobin : 27.7 pg  Mean Cell Hemoglobin Concentration : 29.7 gm/dL  Auto Neutrophil # : x  Auto Lymphocyte # : x  Auto Monocyte # : x  Auto Eosinophil # : x  Auto Basophil # : x  Auto Neutrophil % : x  Auto Lymphocyte % : x  Auto Monocyte % : x  Auto Eosinophil % : x  Auto Basophil % : x    12-25    138  |  95<L>  |  20<H>  ----------------------------<  230<H>  4.3   |  41<H>  |  0.67    Ca    8.0<L>      25 Dec 2023 04:00  Phos  2.5     12-25  Mg     1.7     12-25    TPro  5.6<L>  /  Alb  2.8<L>  /  TBili  0.2  /  DBili  x   /  AST  10  /  ALT  26  /  AlkPhos  44  12-25    PT/INR - ( 24 Dec 2023 20:25 )   PT: 11.0 sec;   INR: 0.96 ratio         PTT - ( 24 Dec 2023 20:25 )  PTT:29.2 sec  Urinalysis Basic - ( 25 Dec 2023 04:00 )    Color: x / Appearance: x / SG: x / pH: x  Gluc: 230 mg/dL / Ketone: x  / Bili: x / Urobili: x   Blood: x / Protein: x / Nitrite: x   Leuk Esterase: x / RBC: x / WBC x   Sq Epi: x / Non Sq Epi: x / Bacteria: x          RADIOLOGY & ADDITIONAL STUDIES REVIEWED DURING TEAM ROUNDS    [ ]GOALS OF CARE DISCUSSION WITH PATIENT/FAMILY/PROXY:     INTERVAL HPI/OVERNIGHT EVENTS:       PRESSORS: [ ] YES [ x] NO  WHICH:    ANTIBIOTICS:                  DATE STARTED:  ANTIBIOTICS:                  DATE STARTED:    Antimicrobial:  azithromycin  IVPB 500 milliGRAM(s) IV Intermittent every 24 hours  cefTRIAXone   IVPB 1000 milliGRAM(s) IV Intermittent every 24 hours    Cardiovascular:  norepinephrine Infusion 0.05 MICROgram(s)/kG/Min IV Continuous <Continuous>    Pulmonary:  albuterol    90 MICROgram(s) HFA Inhaler 2 Puff(s) Inhalation every 6 hours  albuterol/ipratropium for Nebulization 3 milliLiter(s) Nebulizer every 6 hours  budesonide 160 MICROgram(s)/formoterol 4.5 MICROgram(s) Inhaler 2 Puff(s) Inhalation two times a day  tiotropium 2.5 MICROgram(s) Inhaler 2 Puff(s) Inhalation daily    Hematalogic:  heparin   Injectable 5000 Unit(s) SubCutaneous every 8 hours    Other:  chlorhexidine 0.12% Liquid 15 milliLiter(s) Oral Mucosa every 12 hours  insulin lispro (ADMELOG) corrective regimen sliding scale   SubCutaneous three times a day before meals  lactated ringers. 1000 milliLiter(s) IV Continuous <Continuous>  methylPREDNISolone sodium succinate Injectable 40 milliGRAM(s) IV Push every 6 hours  propofol Infusion 5 MICROgram(s)/kG/Min IV Continuous <Continuous>    albuterol    90 MICROgram(s) HFA Inhaler 2 Puff(s) Inhalation every 6 hours  albuterol/ipratropium for Nebulization 3 milliLiter(s) Nebulizer every 6 hours  azithromycin  IVPB 500 milliGRAM(s) IV Intermittent every 24 hours  budesonide 160 MICROgram(s)/formoterol 4.5 MICROgram(s) Inhaler 2 Puff(s) Inhalation two times a day  cefTRIAXone   IVPB 1000 milliGRAM(s) IV Intermittent every 24 hours  chlorhexidine 0.12% Liquid 15 milliLiter(s) Oral Mucosa every 12 hours  heparin   Injectable 5000 Unit(s) SubCutaneous every 8 hours  insulin lispro (ADMELOG) corrective regimen sliding scale   SubCutaneous three times a day before meals  lactated ringers. 1000 milliLiter(s) IV Continuous <Continuous>  methylPREDNISolone sodium succinate Injectable 40 milliGRAM(s) IV Push every 6 hours  norepinephrine Infusion 0.05 MICROgram(s)/kG/Min IV Continuous <Continuous>  propofol Infusion 5 MICROgram(s)/kG/Min IV Continuous <Continuous>  tiotropium 2.5 MICROgram(s) Inhaler 2 Puff(s) Inhalation daily    Drug Dosing Weight  Height (cm): 185.4 (06 Oct 2023 18:01)  Weight (kg): 88.7 (25 Dec 2023 07:35)  BMI (kg/m2): 25.8 (25 Dec 2023 07:35)  BSA (m2): 2.13 (25 Dec 2023 07:35)    PHYSICAL EXAM:  GENERAL: NAD, intubated  EYES: EOMI, PERRLA  NECK: Supple, No JVD; Trachea midline: No LAD   NERVOUS SYSTEM:  intubated and sedated  CHEST/LUNG:  breath sounds present bilaterally, anteriorly clear bilaterally   HEART: Regular rate and rhythm; No murmurs, rubs, or gallops  ABDOMEN: Soft, Nontender, Nondistended; Bowel sounds present, no pain or masses on palpation  : voiding well, Amor in place  EXTREMITIES:  2+ Peripheral Pulses, No clubbing, cyanosis, or edema  SKIN: warm, intact, no lesions; dry     LINES/DRAINS/DEVICES  CENTRAL LINE: [ ] YES [ x] NO  LOCATION:     AMOR: [x ] YES [ ] NO     A-LINE:  [ ] YES [x ] NO  LOCATION:       ICU Vital Signs Last 24 Hrs  T(C): 38.1 (25 Dec 2023 07:15), Max: 38.1 (25 Dec 2023 06:40)  T(F): 100.6 (25 Dec 2023 07:15), Max: 100.6 (25 Dec 2023 06:40)  HR: 92 (25 Dec 2023 07:15) (64 - 120)  BP: 87/69 (25 Dec 2023 07:15) (71/55 - 126/58)  BP(mean): 76 (25 Dec 2023 07:15) (62 - 94)  ABP: --  ABP(mean): --  RR: 25 (25 Dec 2023 07:15) (12 - 26)  SpO2: 98% (25 Dec 2023 07:15) (96% - 100%)    O2 Parameters below as of 25 Dec 2023 07:15  Patient On (Oxygen Delivery Method): ventilator    O2 Concentration (%): 40        ABG - ( 25 Dec 2023 03:48 )  pH, Arterial: 7.41  pH, Blood: x     /  pCO2: 71    /  pO2: 229   / HCO3: 45    / Base Excess: 16.6  /  SaO2: 100                   12-24 @ 07:01  -  12-25 @ 07:00  --------------------------------------------------------  IN: 2510.2 mL / OUT: 310 mL / NET: 2200.2 mL        Mode: AC/ CMV (Assist Control/ Continuous Mandatory Ventilation)  RR (machine): 25  TV (machine): 400  FiO2: 40  PEEP: 5  ITime: 0.9  MAP: 11  PIP: 25        LABS:  CBC Full  -  ( 25 Dec 2023 04:00 )  WBC Count : 10.75 K/uL  RBC Count : 4.12 M/uL  Hemoglobin : 11.4 g/dL  Hematocrit : 38.4 %  Platelet Count - Automated : 174 K/uL  Mean Cell Volume : 93.2 fl  Mean Cell Hemoglobin : 27.7 pg  Mean Cell Hemoglobin Concentration : 29.7 gm/dL  Auto Neutrophil # : x  Auto Lymphocyte # : x  Auto Monocyte # : x  Auto Eosinophil # : x  Auto Basophil # : x  Auto Neutrophil % : x  Auto Lymphocyte % : x  Auto Monocyte % : x  Auto Eosinophil % : x  Auto Basophil % : x    12-25    138  |  95<L>  |  20<H>  ----------------------------<  230<H>  4.3   |  41<H>  |  0.67    Ca    8.0<L>      25 Dec 2023 04:00  Phos  2.5     12-25  Mg     1.7     12-25    TPro  5.6<L>  /  Alb  2.8<L>  /  TBili  0.2  /  DBili  x   /  AST  10  /  ALT  26  /  AlkPhos  44  12-25    PT/INR - ( 24 Dec 2023 20:25 )   PT: 11.0 sec;   INR: 0.96 ratio         PTT - ( 24 Dec 2023 20:25 )  PTT:29.2 sec  Urinalysis Basic - ( 25 Dec 2023 04:00 )    Color: x / Appearance: x / SG: x / pH: x  Gluc: 230 mg/dL / Ketone: x  / Bili: x / Urobili: x   Blood: x / Protein: x / Nitrite: x   Leuk Esterase: x / RBC: x / WBC x   Sq Epi: x / Non Sq Epi: x / Bacteria: x          RADIOLOGY & ADDITIONAL STUDIES REVIEWED DURING TEAM ROUNDS    [ ]GOALS OF CARE DISCUSSION WITH PATIENT/FAMILY/PROXY:     INTERVAL HPI/OVERNIGHT EVENTS:       PRESSORS: [ ] YES [ x] NO  WHICH:    ANTIBIOTICS:                  DATE STARTED:  ANTIBIOTICS:                  DATE STARTED:    Antimicrobial:  azithromycin  IVPB 500 milliGRAM(s) IV Intermittent every 24 hours  cefTRIAXone   IVPB 1000 milliGRAM(s) IV Intermittent every 24 hours    Cardiovascular:    Pulmonary:  albuterol    90 MICROgram(s) HFA Inhaler 2 Puff(s) Inhalation every 6 hours  albuterol/ipratropium for Nebulization 3 milliLiter(s) Nebulizer every 6 hours  budesonide 160 MICROgram(s)/formoterol 4.5 MICROgram(s) Inhaler 2 Puff(s) Inhalation two times a day  tiotropium 2.5 MICROgram(s) Inhaler 2 Puff(s) Inhalation daily    Hematalogic:  heparin   Injectable 5000 Unit(s) SubCutaneous every 8 hours    Other:  chlorhexidine 0.12% Liquid 15 milliLiter(s) Oral Mucosa every 12 hours  insulin lispro (ADMELOG) corrective regimen sliding scale   SubCutaneous three times a day before meals  lactated ringers. 1000 milliLiter(s) IV Continuous <Continuous>  methylPREDNISolone sodium succinate Injectable 40 milliGRAM(s) IV Push every 6 hours  propofol Infusion 5 MICROgram(s)/kG/Min IV Continuous <Continuous>    albuterol    90 MICROgram(s) HFA Inhaler 2 Puff(s) Inhalation every 6 hours  albuterol/ipratropium for Nebulization 3 milliLiter(s) Nebulizer every 6 hours  azithromycin  IVPB 500 milliGRAM(s) IV Intermittent every 24 hours  budesonide 160 MICROgram(s)/formoterol 4.5 MICROgram(s) Inhaler 2 Puff(s) Inhalation two times a day  cefTRIAXone   IVPB 1000 milliGRAM(s) IV Intermittent every 24 hours  chlorhexidine 0.12% Liquid 15 milliLiter(s) Oral Mucosa every 12 hours  heparin   Injectable 5000 Unit(s) SubCutaneous every 8 hours  insulin lispro (ADMELOG) corrective regimen sliding scale   SubCutaneous three times a day before meals  lactated ringers. 1000 milliLiter(s) IV Continuous <Continuous>  methylPREDNISolone sodium succinate Injectable 40 milliGRAM(s) IV Push every 6 hours  norepinephrine Infusion 0.05 MICROgram(s)/kG/Min IV Continuous <Continuous>  propofol Infusion 5 MICROgram(s)/kG/Min IV Continuous <Continuous>  tiotropium 2.5 MICROgram(s) Inhaler 2 Puff(s) Inhalation daily    Drug Dosing Weight  Height (cm): 185.4 (06 Oct 2023 18:01)  Weight (kg): 88.7 (25 Dec 2023 07:35)  BMI (kg/m2): 25.8 (25 Dec 2023 07:35)  BSA (m2): 2.13 (25 Dec 2023 07:35)    PHYSICAL EXAM:  GENERAL: NAD, intubated  EYES: EOMI, PERRLA  NECK: Supple, No JVD; Trachea midline: No LAD   NERVOUS SYSTEM:  intubated and sedated  CHEST/LUNG:  breath sounds present bilaterally, anteriorly clear bilaterally   HEART: Regular rate and rhythm; No murmurs, rubs, or gallops  ABDOMEN: Soft, Nontender, Nondistended; Bowel sounds present, no pain or masses on palpation  : voiding well, Amor in place  EXTREMITIES:  2+ Peripheral Pulses, No clubbing, cyanosis, or edema; tattoos on L arm and hand; lines without signs of infection   SKIN: warm, intact, no lesions; dry     LINES/DRAINS/DEVICES  CENTRAL LINE: [ ] YES [ x] NO  LOCATION:     AMOR: [x ] YES [ ] NO     A-LINE:  [ ] YES [x ] NO  LOCATION:       ICU Vital Signs Last 24 Hrs  T(C): 38.1 (25 Dec 2023 07:15), Max: 38.1 (25 Dec 2023 06:40)  T(F): 100.6 (25 Dec 2023 07:15), Max: 100.6 (25 Dec 2023 06:40)  HR: 92 (25 Dec 2023 07:15) (64 - 120)  BP: 87/69 (25 Dec 2023 07:15) (71/55 - 126/58)  BP(mean): 76 (25 Dec 2023 07:15) (62 - 94)  ABP: --  ABP(mean): --  RR: 25 (25 Dec 2023 07:15) (12 - 26)  SpO2: 98% (25 Dec 2023 07:15) (96% - 100%)    O2 Parameters below as of 25 Dec 2023 07:15  Patient On (Oxygen Delivery Method): ventilator    O2 Concentration (%): 40        ABG - ( 25 Dec 2023 03:48 )  pH, Arterial: 7.41  pH, Blood: x     /  pCO2: 71    /  pO2: 229   / HCO3: 45    / Base Excess: 16.6  /  SaO2: 100                   12-24 @ 07:01  -  12-25 @ 07:00  --------------------------------------------------------  IN: 2510.2 mL / OUT: 310 mL / NET: 2200.2 mL        Mode: AC/ CMV (Assist Control/ Continuous Mandatory Ventilation)  RR (machine): 25  TV (machine): 400  FiO2: 40  PEEP: 5  ITime: 0.9  MAP: 11  PIP: 25        LABS:  CBC Full  -  ( 25 Dec 2023 04:00 )  WBC Count : 10.75 K/uL  RBC Count : 4.12 M/uL  Hemoglobin : 11.4 g/dL  Hematocrit : 38.4 %  Platelet Count - Automated : 174 K/uL  Mean Cell Volume : 93.2 fl  Mean Cell Hemoglobin : 27.7 pg  Mean Cell Hemoglobin Concentration : 29.7 gm/dL  Auto Neutrophil # : x  Auto Lymphocyte # : x  Auto Monocyte # : x  Auto Eosinophil # : x  Auto Basophil # : x  Auto Neutrophil % : x  Auto Lymphocyte % : x  Auto Monocyte % : x  Auto Eosinophil % : x  Auto Basophil % : x    12-25    138  |  95<L>  |  20<H>  ----------------------------<  230<H>  4.3   |  41<H>  |  0.67    Ca    8.0<L>      25 Dec 2023 04:00  Phos  2.5     12-25  Mg     1.7     12-25    TPro  5.6<L>  /  Alb  2.8<L>  /  TBili  0.2  /  DBili  x   /  AST  10  /  ALT  26  /  AlkPhos  44  12-25    PT/INR - ( 24 Dec 2023 20:25 )   PT: 11.0 sec;   INR: 0.96 ratio         PTT - ( 24 Dec 2023 20:25 )  PTT:29.2 sec  Urinalysis Basic - ( 25 Dec 2023 04:00 )    Color: x / Appearance: x / SG: x / pH: x  Gluc: 230 mg/dL / Ketone: x  / Bili: x / Urobili: x   Blood: x / Protein: x / Nitrite: x   Leuk Esterase: x / RBC: x / WBC x   Sq Epi: x / Non Sq Epi: x / Bacteria: x          RADIOLOGY & ADDITIONAL STUDIES REVIEWED DURING TEAM ROUNDS    [ ]GOALS OF CARE DISCUSSION WITH PATIENT/FAMILY/PROXY:

## 2023-12-25 NOTE — H&P ADULT - HISTORY OF PRESENT ILLNESS
Patient is a 64 yo male from [facility], active smoker, with a PMHx of CAD, CHF on nocturnal BIPAP, COPD ( MULTIPLE intubations in past on 3L of home O2), peripheral neuropathy, GERD, HTN, HLD, obesity, polyarthritis, Pulmonary HTN, TIA, Vit D Def, presents with hypoxemia from nursing facility and lethargy. Patient unable to provide medical history. As per ED, documentation patient had shortness of breath, no further information.     Upon chart review, patient was admitted to [facility] ICU in October 23 due to acute on chronic hypoxic hypercapnic respiratory failure. Patient was eventually weaned off ventilator and then discharged home. Patient was also recently admitted to [facility] where he was intubated there as well. Patient' sister Chiquita Calderon [phone] contacted and updated.    Patient is a 62 yo male from [facility] [place], active smoker, with a PMHx of CAD, CHF on nocturnal BIPAP, COPD ( MULTIPLE intubations in past on 3L of home O2), peripheral neuropathy, GERD, HTN, HLD, obesity, polyarthritis, Pulmonary HTN, TIA, Vit D Def, presents with hypoxemia from nursing facility and lethargy. Patient unable to provide medical history. As per ED, documentation patient had shortness of breath, no further information.     Upon chart review, patient was admitted to [facility] ICU in October 23 due to acute on chronic hypoxic hypercapnic respiratory failure. Patient was eventually weaned off ventilator and then discharged home. Patient was also recently admitted to [facility] where he was intubated there as well. Patient' sister Chiquita Calderon [phone] contacted and updated.

## 2023-12-25 NOTE — H&P ADULT - CONVERSATION DETAILS
As per advanced directives, patient is Full Code. Patient' sister Chiquita Calderon [phone] contacted and updated. As per advanced directives, patient is Full Code. Patient' sister Chiquita Calderon [phone] contacted and updated.

## 2023-12-25 NOTE — PROGRESS NOTE ADULT - ASSESSMENT
Patient is a 64 yo male from [facility], active smoker, with a PMHx of CAD, CHF on nocturnal BIPAP, COPD ( MULTIPLE intubations in past on 3L of home O2), peripheral neuropathy, GERD, HTN, HLD, obesity, polyarthritis, Pulmonary HTN, TIA, Vit D Def, presents with hypoxemia from nursing facility and lethargy. Upon evaluation in ED, patient afebrile, BP of 126/58, tachycardic to 115bpm, hypoxic placed on 15L NRBM saturating 97%. ABG showing ph of 7.17, CO2 of 145. Patient noted to be lethargic and subsequently intubated. Physical examination noted for dry mucus membranes, mark in place immediately draining 2L clear urine. Lungs clear to auscultation. Labs significant for no leukocytosis, lactate 0.9, negative cardiac enzymes and UA negative for infection. CT head negative for any acute intracranial processes. CT chest showing patchy opacities in the right middle lobe and bilateral lower lobes with areas of consolidation suspicious for infectious etiology. EKG sinus tachycardia 115bpm with RBBB with no acute ischemic changes. Patient admitted to ICU for Acute on chronic hypoxic hypercapnic respiratory failure 2/2 Suspected COPD exacerbation.       #Acute encephalopathy   #Acute on chronic hypoxic hypercapnic respiratory failure 2/2 suspected COPD exacerbation   # Hx of COPD  #Hx of DM   # Chronic CHF   # Hx of CAD  # HTN  # HLD  # GERD   # Urinary retention     Neurological  # Acute encephalopathy  - patient noted to be lethargic upon arrival  - patient then subsequently intubated for airway protection  - noted to have C02 level of 145, CT head negative for any acute infarct, UA negative   - Acute encephalopathy can be 2/2 Acute on chronic CO2 retention   - will titrate vent settings  - continue with sedation with propofol   - continue Rocephin  and Azithromycin due to CT chest findings   - f/u urine and blood cultures   - repeat ABG post intubation     Pulmonary  # Acute on chronic hypoxic hypercapnic respiratory failure   - noted to have hx of COPD on Nocturnal bipap  - patient intubated in ED, due to lethargy   - can be 2/2 CO2 retention noted to be 145  - CT chest showing patchy opacities in right middle lobes and bilateral lobes   - f/u post intubation ABG   - continue with home nebulizers through vent   - continue with rocephin and azithromyocin  - continue with solumedrol     #Hx of COPD  - patient noted to be on symbicort  - will resume meds nebs via vent    - CT chest showing patchy opacities in right middle lobes and bilateral lobes   - f/u post intubation ABG   - continue with home nebulizers through vent   - continue with rocephin and azithromyocin  - continue with solumedrol     Cardiac  # HX of CAD   - patient noted to be on ASA at home   - can resume meds once NGT is in place  - troponin noted to be negative    #HTN   - patient noted to be on antihypertensives at home   - noted to be on propofol, patient is normotensive  - hold home anti hypertensives for now     #Chronic CHF   - patient noted to have hx of Chronic CHF  - last echo noted to show EF>70%  - will resume home medications    Renal  - no acute issues     Infectious disease  #Suspected PNA on CT chest    CT chest showing patchy opacities in right middle lobes and bilateral lobes   - f/u post intubation ABG   - continue with home nebulizers through vent   - continue with rocephin and azithromyocin  - continue with solumedrol   - f/u urine and blood cultures     Heme  - no acute issues    GI  - NPO    Endo     #HX of DM  - noted to be on lantus 30 units QHS  - on SSI  - q6hrs finger sticks  - if persistently high can resume lantus and titrate towards home dose      #Urinary retention   - patient noted to produce 2L of urine after mark was placed  - UA negative for infection   - f/u urine cultures    Skin  - no acute issues    Lines  - peripheral IV lines    Disposition   Patient accepted and transferred to ICU  Patient is a 62 yo male from [facility], active smoker, with a PMHx of CAD, CHF on nocturnal BIPAP, COPD ( MULTIPLE intubations in past on 3L of home O2), peripheral neuropathy, GERD, HTN, HLD, obesity, polyarthritis, Pulmonary HTN, TIA, Vit D Def, presents with hypoxemia from nursing facility and lethargy. Upon evaluation in ED, patient afebrile, BP of 126/58, tachycardic to 115bpm, hypoxic placed on 15L NRBM saturating 97%. ABG showing ph of 7.17, CO2 of 145. Patient noted to be lethargic and subsequently intubated. Physical examination noted for dry mucus membranes, mark in place immediately draining 2L clear urine. Lungs clear to auscultation. Labs significant for no leukocytosis, lactate 0.9, negative cardiac enzymes and UA negative for infection. CT head negative for any acute intracranial processes. CT chest showing patchy opacities in the right middle lobe and bilateral lower lobes with areas of consolidation suspicious for infectious etiology. EKG sinus tachycardia 115bpm with RBBB with no acute ischemic changes. Patient admitted to ICU for Acute on chronic hypoxic hypercapnic respiratory failure 2/2 Suspected COPD exacerbation.       #Acute encephalopathy   #Acute on chronic hypoxic hypercapnic respiratory failure 2/2 suspected COPD exacerbation   # Hx of COPD  #Hx of DM   # Chronic CHF   # Hx of CAD  # HTN  # HLD  # GERD   # Urinary retention     Neurological  # Acute encephalopathy  - patient noted to be lethargic upon arrival  - patient then subsequently intubated for airway protection  - noted to have C02 level of 145, CT head negative for any acute infarct, UA negative   - Acute encephalopathy can be 2/2 Acute on chronic CO2 retention   - will titrate vent settings  - continue with sedation with propofol   - continue Rocephin  and Azithromycin due to CT chest findings   - f/u urine and blood cultures   - repeat ABG post intubation     Pulmonary  # Acute on chronic hypoxic hypercapnic respiratory failure   - noted to have hx of COPD on Nocturnal bipap  - patient intubated in ED, due to lethargy   - can be 2/2 CO2 retention noted to be 145  - CT chest showing patchy opacities in right middle lobes and bilateral lobes   - f/u post intubation ABG   - continue with home nebulizers through vent   - continue with rocephin and azithromyocin  - continue with solumedrol     #Hx of COPD  - patient noted to be on symbicort  - will resume meds nebs via vent    - CT chest showing patchy opacities in right middle lobes and bilateral lobes   - f/u post intubation ABG   - continue with home nebulizers through vent   - continue with rocephin and azithromyocin  - continue with solumedrol     Cardiac  # HX of CAD   - patient noted to be on ASA at home   - can resume meds once NGT is in place  - troponin noted to be negative    #HTN   - patient noted to be on antihypertensives at home   - noted to be on propofol, patient is normotensive  - hold home anti hypertensives for now     #Chronic CHF   - patient noted to have hx of Chronic CHF  - last echo noted to show EF>70%  - will resume home medications    Renal  - no acute issues     Infectious disease  #Suspected PNA on CT chest    CT chest showing patchy opacities in right middle lobes and bilateral lobes   - f/u post intubation ABG   - continue with home nebulizers through vent   - continue with rocephin and azithromyocin  - continue with solumedrol   - f/u urine and blood cultures     Heme  - no acute issues    GI  - NPO    Endo     #HX of DM  - noted to be on lantus 30 units QHS  - on SSI  - q6hrs finger sticks  - if persistently high can resume lantus and titrate towards home dose      #Urinary retention   - patient noted to produce 2L of urine after mark was placed  - UA negative for infection   - f/u urine cultures    Skin  - no acute issues    Lines  - peripheral IV lines    Disposition   Patient accepted and transferred to ICU  Patient is a 62 yo male from [facility], active smoker, with a PMHx of CAD, CHF on nocturnal BIPAP, COPD ( MULTIPLE intubations in past on 3L of home O2), peripheral neuropathy, GERD, HTN, HLD, obesity, polyarthritis, Pulmonary HTN, TIA, Vit D Def, presents with hypoxemia from nursing facility and lethargy. Upon evaluation in ED, patient afebrile, BP of 126/58, tachycardic to 115bpm, hypoxic placed on 15L NRBM saturating 97%. ABG showing ph of 7.17, CO2 of 145. Patient noted to be lethargic and subsequently intubated. Physical examination noted for dry mucus membranes, mark in place immediately draining 2L clear urine. Lungs clear to auscultation. Labs significant for no leukocytosis, lactate 0.9, negative cardiac enzymes and UA negative for infection. CT head negative for any acute intracranial processes. CT chest showing patchy opacities in the right middle lobe and bilateral lower lobes with areas of consolidation suspicious for infectious etiology. EKG sinus tachycardia 115bpm with RBBB with no acute ischemic changes. Patient admitted to ICU for Acute on chronic hypoxic hypercapnic respiratory failure 2/2 Suspected COPD exacerbation.       #Acute encephalopathy   #Acute on chronic hypoxic hypercapnic respiratory failure 2/2 suspected COPD exacerbation   # Hx of COPD  #Hx of DM   # Chronic CHF   # Hx of CAD  # HTN  # HLD  # GERD   # Urinary retention     Neurological  # Acute encephalopathy  - patient noted to be lethargic upon arrival  - patient then subsequently intubated for airway protection  - noted to have C02 level of 145, CT head negative for any acute infarct, UA negative   - Acute encephalopathy can be 2/2 Acute on chronic CO2 retention   - continue with sedation with propofol   - continue ceftriaxone and Azithromycin due to CT chest findings, febrile   - f/u blood cultures   - post-intubation ABG improving    Pulmonary  # Acute on chronic hypoxic hypercapnic respiratory failure   - noted to have hx of COPD on Nocturnal bipap  - patient intubated in ED, due to lethargy   - can be 2/2 CO2 retention noted to be 145  - CT chest showing patchy opacities in right middle lobes and bilateral lobes   - continue with home nebulizers through vent   - continue with ceftriaxone and azithromycin  - continue with solumedrol   - post-intubation ABG improving    #Hx of COPD  - patient noted to be on symbicort  -  c/w nebs via vent   - CT chest showing patchy opacities in right middle lobes and bilateral lobes   - post-intubation ABG improving  - continue with home nebulizers through vent   - continue with ceftriaxone and azithromycin  - continue with solumedrol     Cardiac  # HX of CAD   - patient noted to be on ASA at home   - can resume meds once NGT is in place  - troponin noted to be negative    #HTN   - patient noted to be on antihypertensives at home   - noted to be on propofol, patient is normotensive  - hold home anti hypertensives for now     #Chronic CHF   - patient noted to have hx of Chronic CHF  - last echo noted to show EF>70%  - will resume home medications    Renal  - no acute issues     Infectious disease  #Suspected PNA on CT chest    CT chest showing patchy opacities in right middle lobes and bilateral lobes   - post-intubation ABG improving   - continue with home nebulizers through vent   - continue with ceftriaxone and azithromycin  - continue with solumedrol   - f/u blood cultures     Heme  - no acute issues    GI  - NPO  - place NGT?    Endo     #HX of DM  - noted to be on lantus 30 units QHS  - on SSI  - q6hrs finger sticks  - if persistently high can resume lantus and titrate towards home dose      #Urinary retention   - patient noted to produce 2L of urine after mark was placed  - UA negative for infection       Skin  - no acute issues    Lines  - peripheral IV lines    Disposition   Patient accepted and transferred to ICU  Patient is a 62 yo male from [facility], active smoker, with a PMHx of CAD, CHF on nocturnal BIPAP, COPD ( MULTIPLE intubations in past on 3L of home O2), peripheral neuropathy, GERD, HTN, HLD, obesity, polyarthritis, Pulmonary HTN, TIA, Vit D Def, presents with hypoxemia from nursing facility and lethargy. Upon evaluation in ED, patient afebrile, BP of 126/58, tachycardic to 115bpm, hypoxic placed on 15L NRBM saturating 97%. ABG showing ph of 7.17, CO2 of 145. Patient noted to be lethargic and subsequently intubated. Physical examination noted for dry mucus membranes, mark in place immediately draining 2L clear urine. Lungs clear to auscultation. Labs significant for no leukocytosis, lactate 0.9, negative cardiac enzymes and UA negative for infection. CT head negative for any acute intracranial processes. CT chest showing patchy opacities in the right middle lobe and bilateral lower lobes with areas of consolidation suspicious for infectious etiology. EKG sinus tachycardia 115bpm with RBBB with no acute ischemic changes. Patient admitted to ICU for Acute on chronic hypoxic hypercapnic respiratory failure 2/2 Suspected COPD exacerbation.       #Acute encephalopathy   #Acute on chronic hypoxic hypercapnic respiratory failure 2/2 suspected COPD exacerbation   # Hx of COPD  #Hx of DM   # Chronic CHF   # Hx of CAD  # HTN  # HLD  # GERD   # Urinary retention     Neurological  # Acute encephalopathy  - patient noted to be lethargic upon arrival  - patient then subsequently intubated for airway protection  - noted to have C02 level of 145, CT head negative for any acute infarct, UA negative   - Acute encephalopathy can be 2/2 Acute on chronic CO2 retention   - continue with sedation with propofol   - continue ceftriaxone and Azithromycin due to CT chest findings, febrile   - f/u blood cultures   - post-intubation ABG improving    Pulmonary  # Acute on chronic hypoxic hypercapnic respiratory failure   - noted to have hx of COPD on Nocturnal bipap  - patient intubated in ED, due to lethargy   - can be 2/2 CO2 retention noted to be 145  - CT chest showing patchy opacities in right middle lobes and bilateral lobes   - continue with home nebulizers through vent   - continue with ceftriaxone and azithromycin  - continue with solumedrol   - post-intubation ABG improving    #Hx of COPD  - patient noted to be on symbicort  -  c/w nebs via vent   - CT chest showing patchy opacities in right middle lobes and bilateral lobes   - post-intubation ABG improving  - continue with home nebulizers through vent   - continue with ceftriaxone and azithromycin  - continue with solumedrol     Cardiac  # HX of CAD   - patient noted to be on ASA at home   - can resume meds once NGT is in place  - troponin noted to be negative    #HTN   - patient noted to be on antihypertensives at home   - noted to be on propofol, patient is normotensive  - hold home anti hypertensives for now     #Chronic CHF   - patient noted to have hx of Chronic CHF  - last echo noted to show EF>70%  - will resume home medications    Renal  - no acute issues     Infectious disease  #Suspected PNA on CT chest    CT chest showing patchy opacities in right middle lobes and bilateral lobes   - post-intubation ABG improving   - continue with home nebulizers through vent   - continue with ceftriaxone and azithromycin  - continue with solumedrol   - f/u blood cultures     Heme  - no acute issues    GI  - NPO  - place NGT?    Endo     #HX of DM  - noted to be on lantus 30 units QHS  - on SSI  - q6hrs finger sticks  - if persistently high can resume lantus and titrate towards home dose      #Urinary retention   - patient noted to produce 2L of urine after mark was placed  - UA negative for infection       Skin  - no acute issues    Lines  - peripheral IV lines    Disposition   Patient accepted and transferred to ICU  Patient is a 62 yo male from [facility], active smoker, with a PMHx of CAD, CHF on nocturnal BIPAP, COPD ( MULTIPLE intubations in past on 3L of home O2), peripheral neuropathy, GERD, HTN, HLD, obesity, polyarthritis, Pulmonary HTN, TIA, Vit D Def, presents with hypoxemia from nursing facility and lethargy. Upon evaluation in ED, patient afebrile, BP of 126/58, tachycardic to 115bpm, hypoxic placed on 15L NRBM saturating 97%. ABG showing ph of 7.17, CO2 of 145. Patient noted to be lethargic and subsequently intubated. Physical examination noted for dry mucus membranes, mark in place immediately draining 2L clear urine. Lungs clear to auscultation. Labs significant for no leukocytosis, lactate 0.9, negative cardiac enzymes and UA negative for infection. CT head negative for any acute intracranial processes. CT chest showing patchy opacities in the right middle lobe and bilateral lower lobes with areas of consolidation suspicious for infectious etiology. EKG sinus tachycardia 115bpm with RBBB with no acute ischemic changes. Patient admitted to ICU for Acute on chronic hypoxic hypercapnic respiratory failure 2/2 Suspected COPD exacerbation.       #Acute encephalopathy   #Acute on chronic hypoxic hypercapnic respiratory failure 2/2 suspected COPD exacerbation   # Hx of COPD  #Hx of DM   # Chronic CHF   # Hx of CAD  # HTN  # HLD  # GERD   # Urinary retention     Neurological  # Acute encephalopathy  - patient noted to be lethargic upon arrival  - patient then subsequently intubated for airway protection  - noted to have C02 level of 145, CT head negative for any acute infarct, UA negative   - Acute encephalopathy can be 2/2 Acute on chronic CO2 retention   - continue with sedation with propofol   - continue ceftriaxone and Azithromycin due to CT chest findings, febrile   - f/u blood cultures   - post-intubation ABG improving-- resp alkalosis     Pulmonary  # Acute on chronic hypoxic hypercapnic respiratory failure   - noted to have hx of COPD on Nocturnal bipap  - patient intubated in ED, due to lethargy   - can be 2/2 CO2 retention noted to be 145  - CT chest showing patchy opacities in right middle lobes and bilateral lobes   - continue with home nebulizers through vent   - continue with ceftriaxone and azithromycin  - continue with solumedrol   - post-intubation ABG improving    #Hx of COPD  - patient noted to be on symbicort  -  c/w nebs via vent   - CT chest showing patchy opacities in right middle lobes and bilateral lobes   - post-intubation ABG improving  - continue with home nebulizers through vent   - continue with ceftriaxone and azithromycin  - continue with solumedrol     Cardiac  # HX of CAD   - patient noted to be on ASA at home   - can resume meds once NGT is in place  - troponin noted to be negative    #HTN   - patient noted to be on antihypertensives at home   - noted to be on propofol, patient is normotensive  - hold home anti hypertensives for now     #Chronic CHF   - patient noted to have hx of Chronic CHF  - last echo noted to show EF>70%  - will resume home medications    Renal  - no acute issues     Infectious disease  #Suspected PNA on CT chest    CT chest showing patchy opacities in right middle lobes and bilateral lobes   - post-intubation ABG improving   - continue with home nebulizers through vent   - continue with ceftriaxone and azithromycin  - continue with solumedrol   - f/u blood cultures     Heme  - no acute issues    GI  - NPO  - place NGT?    Endo     #HX of DM  - noted to be on lantus 30 units QHS  - on SSI  - q6hrs finger sticks  - if persistently high can resume lantus and titrate towards home dose      #Urinary retention   - patient noted to produce 2L of urine after mark was placed  - UA negative for infection       Skin  - no acute issues    Lines  - peripheral IV lines    Disposition   Patient accepted and transferred to ICU  Patient is a 62 yo male from [facility], active smoker, with a PMHx of CAD, CHF on nocturnal BIPAP, COPD ( MULTIPLE intubations in past on 3L of home O2), peripheral neuropathy, GERD, HTN, HLD, obesity, polyarthritis, Pulmonary HTN, TIA, Vit D Def, presents with hypoxemia from nursing facility and lethargy. Upon evaluation in ED, patient afebrile, BP of 126/58, tachycardic to 115bpm, hypoxic placed on 15L NRBM saturating 97%. ABG showing ph of 7.17, CO2 of 145. Patient noted to be lethargic and subsequently intubated. Physical examination noted for dry mucus membranes, amrk in place immediately draining 2L clear urine. Lungs clear to auscultation. Labs significant for no leukocytosis, lactate 0.9, negative cardiac enzymes and UA negative for infection. CT head negative for any acute intracranial processes. CT chest showing patchy opacities in the right middle lobe and bilateral lower lobes with areas of consolidation suspicious for infectious etiology. EKG sinus tachycardia 115bpm with RBBB with no acute ischemic changes. Patient admitted to ICU for Acute on chronic hypoxic hypercapnic respiratory failure 2/2 Suspected COPD exacerbation.       #Acute encephalopathy   #Acute on chronic hypoxic hypercapnic respiratory failure 2/2 suspected COPD exacerbation   # Hx of COPD  #Hx of DM   # Chronic CHF   # Hx of CAD  # HTN  # HLD  # GERD   # Urinary retention     Neurological  # Acute encephalopathy  - patient noted to be lethargic upon arrival  - patient then subsequently intubated for airway protection  - noted to have C02 level of 145, CT head negative for any acute infarct, UA negative   - Acute encephalopathy can be 2/2 Acute on chronic CO2 retention   - continue with sedation with propofol   - continue ceftriaxone and Azithromycin due to CT chest findings, febrile   - f/u blood cultures   - post-intubation ABG improving-- resp alkalosis     Pulmonary  # Acute on chronic hypoxic hypercapnic respiratory failure   - noted to have hx of COPD on Nocturnal bipap  - patient intubated in ED, due to lethargy   - can be 2/2 CO2 retention noted to be 145  - CT chest showing patchy opacities in right middle lobes and bilateral lobes   - continue with home nebulizers through vent   - continue with ceftriaxone and azithromycin  - continue with solumedrol   - post-intubation ABG improving    #Hx of COPD  - patient noted to be on symbicort  -  c/w nebs via vent   - CT chest showing patchy opacities in right middle lobes and bilateral lobes   - post-intubation ABG improving  - continue with home nebulizers through vent   - continue with ceftriaxone and azithromycin  - continue with solumedrol     Cardiac  # HX of CAD   - patient noted to be on ASA at home   - can resume meds once NGT is in place  - troponin noted to be negative    #HTN   - patient noted to be on antihypertensives at home   - noted to be on propofol, patient is normotensive  - hold home anti hypertensives for now     #Chronic CHF   - patient noted to have hx of Chronic CHF  - last echo noted to show EF>70%  - will resume home medications    Renal  - no acute issues     Infectious disease  #Suspected PNA on CT chest    CT chest showing patchy opacities in right middle lobes and bilateral lobes   - post-intubation ABG improving   - continue with home nebulizers through vent   - continue with ceftriaxone and azithromycin  - continue with solumedrol   - f/u blood cultures     Heme  - no acute issues    GI  - NPO  - place NGT?    Endo     #HX of DM  - noted to be on lantus 30 units QHS  - on SSI  - q6hrs finger sticks  - if persistently high can resume lantus and titrate towards home dose      #Urinary retention   - patient noted to produce 2L of urine after mark was placed  - UA negative for infection       Skin  - no acute issues    Lines  - peripheral IV lines    Disposition   Patient accepted and transferred to ICU  Patient is a 62 yo male from [facility], active smoker, with a PMHx of CAD, CHF on nocturnal BIPAP, COPD ( MULTIPLE intubations in past on 3L of home O2), peripheral neuropathy, GERD, HTN, HLD, obesity, polyarthritis, Pulmonary HTN, TIA, Vit D Def, presents with hypoxemia from nursing facility and lethargy. Upon evaluation in ED, patient afebrile, BP of 126/58, tachycardic to 115bpm, hypoxic placed on 15L NRBM saturating 97%. ABG showing ph of 7.17, CO2 of 145. Patient noted to be lethargic and subsequently intubated. Physical examination noted for dry mucus membranes, mark in place immediately draining 2L clear urine. Lungs clear to auscultation. Labs significant for no leukocytosis, lactate 0.9, negative cardiac enzymes and UA negative for infection. CT head negative for any acute intracranial processes. CT chest showing patchy opacities in the right middle lobe and bilateral lower lobes with areas of consolidation suspicious for infectious etiology. EKG sinus tachycardia 115bpm with RBBB with no acute ischemic changes. Patient admitted to ICU for Acute on chronic hypoxic hypercapnic respiratory failure 2/2 Suspected COPD exacerbation.       # Acute encephalopathy   # Acute on chronic hypoxic hypercapnic respiratory failure 2/2 suspected COPD exacerbation   # Hx of COPD  # Hx of DM   # Chronic CHF   # Hx of CAD  # HTN  # HLD  # GERD   # Urinary retention     Neurological  # Acute encephalopathy  - patient noted to be lethargic upon arrival  - patient then subsequently intubated for airway protection  - noted to have C02 level of 145, CT head negative for any acute infarct, UA negative   - Acute encephalopathy can be 2/2 Acute on chronic CO2 retention   - continue with sedation with propofol   - continue ceftriaxone and Azithromycin due to CT chest findings, febrile   - f/u blood cultures   - post-intubation ABG improved, likely with CO2 chronically in the 80s    Pulmonary  # Acute on chronic hypoxic hypercapnic respiratory failure   - noted to have hx of COPD on Nocturnal bipap  - patient intubated in ED, due to lethargy   - can be 2/2 CO2 retention noted to be 145  - CT chest showing patchy opacities in right middle lobes and bilateral lobes   - continue with home nebulizers through vent   - continue with ceftriaxone and azithromycin  - continue with solumedrol   - post-intubation ABG improved, likely with CO2 chronically in the 80s    #Hx of COPD  - patient noted to be on symbicort  - c/w nebs via vent   - CT chest showing patchy opacities in right middle lobes and bilateral lobes   - post-intubation ABG improving  - continue with home nebulizers through vent   - continue with ceftriaxone and azithromycin  - continue with solumedrol     Cardiac  # HX of CAD   - patient noted to be on ASA at home   - can resume meds once NGT is in place  - troponin noted to be negative    #HTN   - patient noted to be on antihypertensives at home   - noted to be on propofol, patient is normotensive  - hold home anti hypertensives for now     #Chronic CHF   - patient noted to have hx of Chronic CHF  - last echo noted to show EF>70%  - will resume home medications    Renal  - no acute issues     Infectious disease  #Suspected PNA on CT chest    CT chest showing patchy opacities in right middle lobes and bilateral lobes   - post-intubation ABG improving   - continue with home nebulizers through vent   - continue with ceftriaxone and azithromycin  - continue with solumedrol   - f/u blood cultures   - f/u mycoplasma, legionella  - f/u sputum cx    Heme  - no acute issues    GI  - place NGT, start tube feeds   - protonix     Endo     #HX of DM  - noted to be on lantus 30 units QHS  - on SSI  - q6hrs finger sticks  - if persistently high can resume lantus and titrate towards home dose      #Urinary retention   - patient noted to produce 2L of urine after mark was placed  - UA negative for infection     Skin  - no acute issues    Lines  - peripheral IV lines    Disposition   ICU Patient is a 64 yo male from [facility], active smoker, with a PMHx of CAD, CHF on nocturnal BIPAP, COPD ( MULTIPLE intubations in past on 3L of home O2), peripheral neuropathy, GERD, HTN, HLD, obesity, polyarthritis, Pulmonary HTN, TIA, Vit D Def, presents with hypoxemia from nursing facility and lethargy. Upon evaluation in ED, patient afebrile, BP of 126/58, tachycardic to 115bpm, hypoxic placed on 15L NRBM saturating 97%. ABG showing ph of 7.17, CO2 of 145. Patient noted to be lethargic and subsequently intubated. Physical examination noted for dry mucus membranes, mark in place immediately draining 2L clear urine. Lungs clear to auscultation. Labs significant for no leukocytosis, lactate 0.9, negative cardiac enzymes and UA negative for infection. CT head negative for any acute intracranial processes. CT chest showing patchy opacities in the right middle lobe and bilateral lower lobes with areas of consolidation suspicious for infectious etiology. EKG sinus tachycardia 115bpm with RBBB with no acute ischemic changes. Patient admitted to ICU for Acute on chronic hypoxic hypercapnic respiratory failure 2/2 Suspected COPD exacerbation.       # Acute encephalopathy   # Acute on chronic hypoxic hypercapnic respiratory failure 2/2 suspected COPD exacerbation   # Hx of COPD  # Hx of DM   # Chronic CHF   # Hx of CAD  # HTN  # HLD  # GERD   # Urinary retention     Neurological  # Acute encephalopathy  - patient noted to be lethargic upon arrival  - patient then subsequently intubated for airway protection  - noted to have C02 level of 145, CT head negative for any acute infarct, UA negative   - Acute encephalopathy can be 2/2 Acute on chronic CO2 retention   - continue with sedation with propofol   - continue ceftriaxone and Azithromycin due to CT chest findings, febrile   - f/u blood cultures   - post-intubation ABG improved, likely with CO2 chronically in the 80s    Pulmonary  # Acute on chronic hypoxic hypercapnic respiratory failure   - noted to have hx of COPD on Nocturnal bipap  - patient intubated in ED, due to lethargy   - can be 2/2 CO2 retention noted to be 145  - CT chest showing patchy opacities in right middle lobes and bilateral lobes   - continue with home nebulizers through vent   - continue with ceftriaxone and azithromycin  - continue with solumedrol   - post-intubation ABG improved, likely with CO2 chronically in the 80s    #Hx of COPD  - patient noted to be on symbicort  - c/w nebs via vent   - CT chest showing patchy opacities in right middle lobes and bilateral lobes   - post-intubation ABG improving  - continue with home nebulizers through vent   - continue with ceftriaxone and azithromycin  - continue with solumedrol     Cardiac  # HX of CAD   - patient noted to be on ASA at home   - can resume meds once NGT is in place  - troponin noted to be negative    #HTN   - patient noted to be on antihypertensives at home   - noted to be on propofol, patient is normotensive  - hold home anti hypertensives for now     #Chronic CHF   - patient noted to have hx of Chronic CHF  - last echo noted to show EF>70%  - will resume home medications    Renal  - no acute issues     Infectious disease  #Suspected PNA on CT chest    CT chest showing patchy opacities in right middle lobes and bilateral lobes   - post-intubation ABG improving   - continue with home nebulizers through vent   - continue with ceftriaxone and azithromycin  - continue with solumedrol   - f/u blood cultures   - f/u mycoplasma, legionella  - f/u sputum cx    Heme  - no acute issues    GI  - place NGT, start tube feeds   - protonix     Endo     #HX of DM  - noted to be on lantus 30 units QHS  - on SSI  - q6hrs finger sticks  - if persistently high can resume lantus and titrate towards home dose      #Urinary retention   - patient noted to produce 2L of urine after mark was placed  - UA negative for infection     Skin  - no acute issues    Lines  - peripheral IV lines    Disposition   ICU

## 2023-12-25 NOTE — H&P ADULT - ATTENDING COMMENTS
Patient seen and examined upon consultation request at 9.45PM today. Data reviewed. Case and plan of care discussed with ICU resident.    This is 63M with PMH as listed above including COPD (on home oxygen 3L), CAD, CHF presented to the ED with worsening SOB with respiratory distress.. In the ED he was found to be in copd exacerbation and in acute on chronic hypercapnic respiratory failure and he was intubated and placed on mechanical ventilation by the ED team. Soon after intubation he became hypotensive and was resuscitated with IV fluid boluses with NS to at least 4-5Liters.  At time of ICU evaluation, patient orally intubated  on [facility] vent and peripheral levophed on-going to maintain MAP >65. Patient is accepted to ICU for further management and closer monitoring.     VS reviewed: MAP >65 with ongoing boluses and peripheral Levophed at 0.3mcg/Kg/min, orally intubated and sedated, heart- tachycardic, no murmurs, Lungs- bilateral air entry, no added sounds, Abdomen- obese, soft, , Ext- no edema.    Labs/Imaging reviewed: Na 133, ABG on presentation showed acute on chronic respiratory acidosis, CXR did not show focal infiltrates, prominent hilar opacities likely vascular from pulm HTN.   Bedside POCUS showed decreased LV contractility, no RV strain, no pericardial effusion and IVC small and collapsible.     Assessment  Acute on chronic hypercapnic respiratory failure from COPD exacerbation s/p intubation.  Acute exacerbation of COPD.  Hypovolemia from dehydrated state,  Shock- hypovolemic, cardiogenic   Hypovolemic hyponatremia, mild  H/o COPD  H/o HTN  H/o CAD   H/o CHF  H/o Pulmonary HTN,    Plan  Accepted to ICU   Continue mechanical ventilation. Monitor ABG and adjust vent settings as needed.   Continue IVF boluses with LR to at least 4liters, taper off Levophed as tolerated and maintain MAP >65.   Albuterol/Atrovent nebulizer q2-4hrs for now  IV Solu-medrol 40mg q6hrs  IV Azithromycin for copd exacerbation.  Trend troponins and 12 lead EKG.   Insert OG tube for feeding.   DVT prophylaxis with Lovenox.  GI prophylaxis with protonix.     Condition: Critical, Prognosis: Guarded.    30mins of critical care time spent in mechanical ventilation management, fluid and vasopressor management, management of acute exacerbation of copd, review of labs/imaging. Patient seen and examined upon consultation request at 9.45PM today. Data reviewed. Case and plan of care discussed with ICU resident.    This is 63M with PMH as listed above including COPD (on home oxygen 3L), CAD, CHF presented to the ED with worsening SOB with respiratory distress.. In the ED he was found to be in copd exacerbation and in acute on chronic hypercapnic respiratory failure and he was intubated and placed on mechanical ventilation by the ED team. Soon after intubation he became hypotensive and was resuscitated with IV fluid boluses with NS to at least 4-5Liters.  At time of ICU evaluation, [facility] and peripheral levophed on-going to maintain MAP >65. Patient is accepted to ICU for further management and closer monitoring.     VS reviewed: MAP >65 with ongoing boluses and peripheral Levophed at 0.3mcg/Kg/min, orally intubated and sedated, heart- tachycardic, no murmurs, Lungs- bilateral air entry, no added sounds, Abdomen- obese, soft, , Ext- no edema.    Labs/Imaging reviewed: Na 133, ABG on presentation showed acute on chronic respiratory acidosis, CXR did not show focal infiltrates, prominent hilar opacities likely vascular from pulm HTN.   Bedside POCUS showed decreased LV contractility, no RV strain, no pericardial effusion and IVC small and collapsible.     Assessment  Acute on chronic hypercapnic respiratory failure from COPD exacerbation s/p intubation.  Acute exacerbation of COPD.  Hypovolemia from dehydrated state,  Shock- hypovolemic, cardiogenic   Hypovolemic hyponatremia, mild  H/o COPD  H/o HTN  H/o CAD   H/o CHF  H/o Pulmonary HTN,    Plan  Accepted to ICU   Continue mechanical ventilation. Monitor ABG and adjust vent settings as needed.   Continue IVF boluses with LR to at least 4liters, taper off Levophed as tolerated and maintain MAP >65.   Albuterol/Atrovent nebulizer q2-4hrs for now  IV Solu-medrol 40mg q6hrs  IV Azithromycin for copd exacerbation.  Trend troponins and 12 lead EKG.   Insert OG tube for feeding.   DVT prophylaxis with Lovenox.  GI prophylaxis with protonix.     Condition: Critical, Prognosis: Guarded.    30mins of critical care time spent in mechanical ventilation management, fluid and vasopressor management, management of acute exacerbation of copd, review of labs/imaging.

## 2023-12-26 LAB
ANION GAP SERPL CALC-SCNC: 0 MMOL/L — LOW (ref 5–17)
ANION GAP SERPL CALC-SCNC: 0 MMOL/L — LOW (ref 5–17)
BASOPHILS # BLD AUTO: 0.02 K/UL — SIGNIFICANT CHANGE UP (ref 0–0.2)
BASOPHILS # BLD AUTO: 0.02 K/UL — SIGNIFICANT CHANGE UP (ref 0–0.2)
BASOPHILS NFR BLD AUTO: 0.2 % — SIGNIFICANT CHANGE UP (ref 0–2)
BASOPHILS NFR BLD AUTO: 0.2 % — SIGNIFICANT CHANGE UP (ref 0–2)
BUN SERPL-MCNC: 25 MG/DL — HIGH (ref 7–18)
BUN SERPL-MCNC: 25 MG/DL — HIGH (ref 7–18)
CALCIUM SERPL-MCNC: 7.9 MG/DL — LOW (ref 8.4–10.5)
CALCIUM SERPL-MCNC: 7.9 MG/DL — LOW (ref 8.4–10.5)
CHLORIDE SERPL-SCNC: 94 MMOL/L — LOW (ref 96–108)
CHLORIDE SERPL-SCNC: 94 MMOL/L — LOW (ref 96–108)
CO2 SERPL-SCNC: 40 MMOL/L — HIGH (ref 22–31)
CO2 SERPL-SCNC: 40 MMOL/L — HIGH (ref 22–31)
CREAT SERPL-MCNC: 0.64 MG/DL — SIGNIFICANT CHANGE UP (ref 0.5–1.3)
CREAT SERPL-MCNC: 0.64 MG/DL — SIGNIFICANT CHANGE UP (ref 0.5–1.3)
EGFR: 108 ML/MIN/1.73M2 — SIGNIFICANT CHANGE UP
EGFR: 108 ML/MIN/1.73M2 — SIGNIFICANT CHANGE UP
EOSINOPHIL # BLD AUTO: 0 K/UL — SIGNIFICANT CHANGE UP (ref 0–0.5)
EOSINOPHIL # BLD AUTO: 0 K/UL — SIGNIFICANT CHANGE UP (ref 0–0.5)
EOSINOPHIL NFR BLD AUTO: 0 % — SIGNIFICANT CHANGE UP (ref 0–6)
EOSINOPHIL NFR BLD AUTO: 0 % — SIGNIFICANT CHANGE UP (ref 0–6)
GLUCOSE SERPL-MCNC: 245 MG/DL — HIGH (ref 70–99)
GLUCOSE SERPL-MCNC: 245 MG/DL — HIGH (ref 70–99)
GRAM STN FLD: ABNORMAL
GRAM STN FLD: ABNORMAL
HCT VFR BLD CALC: 38.8 % — LOW (ref 39–50)
HCT VFR BLD CALC: 38.8 % — LOW (ref 39–50)
HGB BLD-MCNC: 11.8 G/DL — LOW (ref 13–17)
HGB BLD-MCNC: 11.8 G/DL — LOW (ref 13–17)
IMM GRANULOCYTES NFR BLD AUTO: 1.4 % — HIGH (ref 0–0.9)
IMM GRANULOCYTES NFR BLD AUTO: 1.4 % — HIGH (ref 0–0.9)
LEGIONELLA AG UR QL: NEGATIVE — SIGNIFICANT CHANGE UP
LEGIONELLA AG UR QL: NEGATIVE — SIGNIFICANT CHANGE UP
LYMPHOCYTES # BLD AUTO: 1.05 K/UL — SIGNIFICANT CHANGE UP (ref 1–3.3)
LYMPHOCYTES # BLD AUTO: 1.05 K/UL — SIGNIFICANT CHANGE UP (ref 1–3.3)
LYMPHOCYTES # BLD AUTO: 11 % — LOW (ref 13–44)
LYMPHOCYTES # BLD AUTO: 11 % — LOW (ref 13–44)
MAGNESIUM SERPL-MCNC: 1.6 MG/DL — SIGNIFICANT CHANGE UP (ref 1.6–2.6)
MAGNESIUM SERPL-MCNC: 1.6 MG/DL — SIGNIFICANT CHANGE UP (ref 1.6–2.6)
MCHC RBC-ENTMCNC: 27.5 PG — SIGNIFICANT CHANGE UP (ref 27–34)
MCHC RBC-ENTMCNC: 27.5 PG — SIGNIFICANT CHANGE UP (ref 27–34)
MCHC RBC-ENTMCNC: 30.4 GM/DL — LOW (ref 32–36)
MCHC RBC-ENTMCNC: 30.4 GM/DL — LOW (ref 32–36)
MCV RBC AUTO: 90.4 FL — SIGNIFICANT CHANGE UP (ref 80–100)
MCV RBC AUTO: 90.4 FL — SIGNIFICANT CHANGE UP (ref 80–100)
MONOCYTES # BLD AUTO: 0.39 K/UL — SIGNIFICANT CHANGE UP (ref 0–0.9)
MONOCYTES # BLD AUTO: 0.39 K/UL — SIGNIFICANT CHANGE UP (ref 0–0.9)
MONOCYTES NFR BLD AUTO: 4.1 % — SIGNIFICANT CHANGE UP (ref 2–14)
MONOCYTES NFR BLD AUTO: 4.1 % — SIGNIFICANT CHANGE UP (ref 2–14)
NEUTROPHILS # BLD AUTO: 7.99 K/UL — HIGH (ref 1.8–7.4)
NEUTROPHILS # BLD AUTO: 7.99 K/UL — HIGH (ref 1.8–7.4)
NEUTROPHILS NFR BLD AUTO: 83.3 % — HIGH (ref 43–77)
NEUTROPHILS NFR BLD AUTO: 83.3 % — HIGH (ref 43–77)
NRBC # BLD: 0 /100 WBCS — SIGNIFICANT CHANGE UP (ref 0–0)
NRBC # BLD: 0 /100 WBCS — SIGNIFICANT CHANGE UP (ref 0–0)
PHOSPHATE SERPL-MCNC: 3.1 MG/DL — SIGNIFICANT CHANGE UP (ref 2.5–4.5)
PHOSPHATE SERPL-MCNC: 3.1 MG/DL — SIGNIFICANT CHANGE UP (ref 2.5–4.5)
PLATELET # BLD AUTO: 192 K/UL — SIGNIFICANT CHANGE UP (ref 150–400)
PLATELET # BLD AUTO: 192 K/UL — SIGNIFICANT CHANGE UP (ref 150–400)
POTASSIUM SERPL-MCNC: 4.8 MMOL/L — SIGNIFICANT CHANGE UP (ref 3.5–5.3)
POTASSIUM SERPL-MCNC: 4.8 MMOL/L — SIGNIFICANT CHANGE UP (ref 3.5–5.3)
POTASSIUM SERPL-SCNC: 4.8 MMOL/L — SIGNIFICANT CHANGE UP (ref 3.5–5.3)
POTASSIUM SERPL-SCNC: 4.8 MMOL/L — SIGNIFICANT CHANGE UP (ref 3.5–5.3)
RBC # BLD: 4.29 M/UL — SIGNIFICANT CHANGE UP (ref 4.2–5.8)
RBC # BLD: 4.29 M/UL — SIGNIFICANT CHANGE UP (ref 4.2–5.8)
RBC # FLD: 13.2 % — SIGNIFICANT CHANGE UP (ref 10.3–14.5)
RBC # FLD: 13.2 % — SIGNIFICANT CHANGE UP (ref 10.3–14.5)
S PNEUM AG UR QL: NEGATIVE — SIGNIFICANT CHANGE UP
S PNEUM AG UR QL: NEGATIVE — SIGNIFICANT CHANGE UP
SODIUM SERPL-SCNC: 134 MMOL/L — LOW (ref 135–145)
SODIUM SERPL-SCNC: 134 MMOL/L — LOW (ref 135–145)
SPECIMEN SOURCE: SIGNIFICANT CHANGE UP
SPECIMEN SOURCE: SIGNIFICANT CHANGE UP
WBC # BLD: 9.58 K/UL — SIGNIFICANT CHANGE UP (ref 3.8–10.5)
WBC # BLD: 9.58 K/UL — SIGNIFICANT CHANGE UP (ref 3.8–10.5)
WBC # FLD AUTO: 9.58 K/UL — SIGNIFICANT CHANGE UP (ref 3.8–10.5)
WBC # FLD AUTO: 9.58 K/UL — SIGNIFICANT CHANGE UP (ref 3.8–10.5)

## 2023-12-26 RX ORDER — GABAPENTIN 400 MG/1
100 CAPSULE ORAL THREE TIMES A DAY
Refills: 0 | Status: DISCONTINUED | OUTPATIENT
Start: 2023-12-26 | End: 2024-01-02

## 2023-12-26 RX ORDER — INSULIN LISPRO 100/ML
5 VIAL (ML) SUBCUTANEOUS
Refills: 0 | Status: DISCONTINUED | OUTPATIENT
Start: 2023-12-26 | End: 2023-12-27

## 2023-12-26 RX ORDER — HUMAN INSULIN 100 [IU]/ML
10 INJECTION, SUSPENSION SUBCUTANEOUS ONCE
Refills: 0 | Status: COMPLETED | OUTPATIENT
Start: 2023-12-26 | End: 2023-12-26

## 2023-12-26 RX ORDER — LATANOPROST 0.05 MG/ML
1 SOLUTION/ DROPS OPHTHALMIC; TOPICAL AT BEDTIME
Refills: 0 | Status: DISCONTINUED | OUTPATIENT
Start: 2023-12-26 | End: 2024-01-02

## 2023-12-26 RX ORDER — BUDESONIDE AND FORMOTEROL FUMARATE DIHYDRATE 160; 4.5 UG/1; UG/1
2 AEROSOL RESPIRATORY (INHALATION)
Refills: 0 | DISCHARGE

## 2023-12-26 RX ORDER — IPRATROPIUM BROMIDE 0.2 MG/ML
2.5 SOLUTION, NON-ORAL INHALATION
Refills: 0 | DISCHARGE

## 2023-12-26 RX ORDER — ALBUTEROL 90 UG/1
3 AEROSOL, METERED ORAL
Refills: 0 | DISCHARGE

## 2023-12-26 RX ORDER — ASPIRIN/CALCIUM CARB/MAGNESIUM 324 MG
81 TABLET ORAL DAILY
Refills: 0 | Status: DISCONTINUED | OUTPATIENT
Start: 2023-12-26 | End: 2024-01-02

## 2023-12-26 RX ORDER — TRAZODONE HCL 50 MG
150 TABLET ORAL DAILY
Refills: 0 | Status: DISCONTINUED | OUTPATIENT
Start: 2023-12-26 | End: 2023-12-28

## 2023-12-26 RX ORDER — ACETAMINOPHEN 500 MG
1000 TABLET ORAL ONCE
Refills: 0 | Status: COMPLETED | OUTPATIENT
Start: 2023-12-26 | End: 2023-12-26

## 2023-12-26 RX ORDER — LATANOPROST 0.05 MG/ML
0 SOLUTION/ DROPS OPHTHALMIC; TOPICAL
Refills: 0 | DISCHARGE

## 2023-12-26 RX ORDER — ATORVASTATIN CALCIUM 80 MG/1
1 TABLET, FILM COATED ORAL
Refills: 0 | DISCHARGE

## 2023-12-26 RX ORDER — ALPRAZOLAM 0.25 MG
1 TABLET ORAL
Refills: 0 | DISCHARGE

## 2023-12-26 RX ORDER — INSULIN LISPRO 100/ML
VIAL (ML) SUBCUTANEOUS EVERY 4 HOURS
Refills: 0 | Status: DISCONTINUED | OUTPATIENT
Start: 2023-12-26 | End: 2023-12-26

## 2023-12-26 RX ORDER — INSULIN LISPRO 100/ML
VIAL (ML) SUBCUTANEOUS
Refills: 0 | Status: DISCONTINUED | OUTPATIENT
Start: 2023-12-26 | End: 2023-12-27

## 2023-12-26 RX ORDER — LIDOCAINE 4 G/100G
1 CREAM TOPICAL DAILY
Refills: 0 | Status: DISCONTINUED | OUTPATIENT
Start: 2023-12-26 | End: 2024-01-02

## 2023-12-26 RX ORDER — INSULIN LISPRO 100/ML
VIAL (ML) SUBCUTANEOUS AT BEDTIME
Refills: 0 | Status: DISCONTINUED | OUTPATIENT
Start: 2023-12-26 | End: 2023-12-27

## 2023-12-26 RX ORDER — INSULIN GLARGINE 100 [IU]/ML
15 INJECTION, SOLUTION SUBCUTANEOUS AT BEDTIME
Refills: 0 | Status: DISCONTINUED | OUTPATIENT
Start: 2023-12-26 | End: 2023-12-27

## 2023-12-26 RX ORDER — NICOTINE POLACRILEX 2 MG
1 GUM BUCCAL
Refills: 0 | DISCHARGE

## 2023-12-26 RX ORDER — FINASTERIDE 5 MG/1
1 TABLET, FILM COATED ORAL
Refills: 0 | DISCHARGE

## 2023-12-26 RX ORDER — POLYETHYLENE GLYCOL 3350 17 G/17G
1 POWDER, FOR SOLUTION ORAL
Refills: 0 | DISCHARGE

## 2023-12-26 RX ORDER — TAMSULOSIN HYDROCHLORIDE 0.4 MG/1
1 CAPSULE ORAL
Refills: 0 | DISCHARGE

## 2023-12-26 RX ORDER — ASPIRIN/CALCIUM CARB/MAGNESIUM 324 MG
1 TABLET ORAL
Refills: 0 | DISCHARGE

## 2023-12-26 RX ORDER — CEFTRIAXONE 500 MG/1
1000 INJECTION, POWDER, FOR SOLUTION INTRAMUSCULAR; INTRAVENOUS EVERY 24 HOURS
Refills: 0 | Status: DISCONTINUED | OUTPATIENT
Start: 2023-12-26 | End: 2023-12-28

## 2023-12-26 RX ORDER — GLUCAGON INJECTION, SOLUTION 0.5 MG/.1ML
1 INJECTION, SOLUTION SUBCUTANEOUS ONCE
Refills: 0 | Status: DISCONTINUED | OUTPATIENT
Start: 2023-12-26 | End: 2024-01-02

## 2023-12-26 RX ORDER — GABAPENTIN 400 MG/1
1 CAPSULE ORAL
Refills: 0 | DISCHARGE

## 2023-12-26 RX ORDER — SENNA PLUS 8.6 MG/1
2 TABLET ORAL
Refills: 0 | DISCHARGE

## 2023-12-26 RX ORDER — FINASTERIDE 5 MG/1
5 TABLET, FILM COATED ORAL DAILY
Refills: 0 | Status: DISCONTINUED | OUTPATIENT
Start: 2023-12-26 | End: 2024-01-02

## 2023-12-26 RX ORDER — TRAZODONE HCL 50 MG
1 TABLET ORAL
Refills: 0 | DISCHARGE

## 2023-12-26 RX ORDER — PANTOPRAZOLE SODIUM 20 MG/1
1 TABLET, DELAYED RELEASE ORAL
Refills: 0 | DISCHARGE

## 2023-12-26 RX ORDER — DEXTROSE 50 % IN WATER 50 %
25 SYRINGE (ML) INTRAVENOUS ONCE
Refills: 0 | Status: DISCONTINUED | OUTPATIENT
Start: 2023-12-26 | End: 2023-12-27

## 2023-12-26 RX ORDER — TAMSULOSIN HYDROCHLORIDE 0.4 MG/1
0.4 CAPSULE ORAL AT BEDTIME
Refills: 0 | Status: DISCONTINUED | OUTPATIENT
Start: 2023-12-26 | End: 2024-01-02

## 2023-12-26 RX ADMIN — Medication 4: at 13:52

## 2023-12-26 RX ADMIN — CEFTRIAXONE 100 MILLIGRAM(S): 500 INJECTION, POWDER, FOR SOLUTION INTRAMUSCULAR; INTRAVENOUS at 11:43

## 2023-12-26 RX ADMIN — Medication 1000 MILLIGRAM(S): at 15:53

## 2023-12-26 RX ADMIN — HEPARIN SODIUM 5000 UNIT(S): 5000 INJECTION INTRAVENOUS; SUBCUTANEOUS at 13:48

## 2023-12-26 RX ADMIN — DEXMEDETOMIDINE HYDROCHLORIDE IN 0.9% SODIUM CHLORIDE 22.2 MICROGRAM(S)/KG/HR: 4 INJECTION INTRAVENOUS at 01:26

## 2023-12-26 RX ADMIN — Medication 3 MILLILITER(S): at 20:01

## 2023-12-26 RX ADMIN — Medication 3 MILLILITER(S): at 03:01

## 2023-12-26 RX ADMIN — GABAPENTIN 100 MILLIGRAM(S): 400 CAPSULE ORAL at 22:25

## 2023-12-26 RX ADMIN — TAMSULOSIN HYDROCHLORIDE 0.4 MILLIGRAM(S): 0.4 CAPSULE ORAL at 22:24

## 2023-12-26 RX ADMIN — Medication 40 MILLIGRAM(S): at 17:19

## 2023-12-26 RX ADMIN — FENTANYL CITRATE 25 MICROGRAM(S): 50 INJECTION INTRAVENOUS at 07:01

## 2023-12-26 RX ADMIN — Medication 3 MILLILITER(S): at 08:15

## 2023-12-26 RX ADMIN — FENTANYL CITRATE 25 MICROGRAM(S): 50 INJECTION INTRAVENOUS at 06:50

## 2023-12-26 RX ADMIN — DEXMEDETOMIDINE HYDROCHLORIDE IN 0.9% SODIUM CHLORIDE 22.2 MICROGRAM(S)/KG/HR: 4 INJECTION INTRAVENOUS at 07:42

## 2023-12-26 RX ADMIN — INSULIN GLARGINE 15 UNIT(S): 100 INJECTION, SOLUTION SUBCUTANEOUS at 22:25

## 2023-12-26 RX ADMIN — Medication 5 UNIT(S): at 17:17

## 2023-12-26 RX ADMIN — Medication 40 MILLIGRAM(S): at 11:44

## 2023-12-26 RX ADMIN — Medication 3 MILLILITER(S): at 14:50

## 2023-12-26 RX ADMIN — PANTOPRAZOLE SODIUM 40 MILLIGRAM(S): 20 TABLET, DELAYED RELEASE ORAL at 11:43

## 2023-12-26 RX ADMIN — Medication 40 MILLIGRAM(S): at 23:38

## 2023-12-26 RX ADMIN — Medication 40 MILLIGRAM(S): at 05:22

## 2023-12-26 RX ADMIN — HUMAN INSULIN 10 UNIT(S): 100 INJECTION, SUSPENSION SUBCUTANEOUS at 11:44

## 2023-12-26 RX ADMIN — CEFTRIAXONE 100 MILLIGRAM(S): 500 INJECTION, POWDER, FOR SOLUTION INTRAMUSCULAR; INTRAVENOUS at 05:22

## 2023-12-26 RX ADMIN — Medication 8: at 17:20

## 2023-12-26 RX ADMIN — AZITHROMYCIN 255 MILLIGRAM(S): 500 TABLET, FILM COATED ORAL at 05:22

## 2023-12-26 RX ADMIN — GABAPENTIN 100 MILLIGRAM(S): 400 CAPSULE ORAL at 13:48

## 2023-12-26 RX ADMIN — LIDOCAINE 1 PATCH: 4 CREAM TOPICAL at 15:14

## 2023-12-26 RX ADMIN — Medication 6: at 06:28

## 2023-12-26 RX ADMIN — HEPARIN SODIUM 5000 UNIT(S): 5000 INJECTION INTRAVENOUS; SUBCUTANEOUS at 22:23

## 2023-12-26 RX ADMIN — LATANOPROST 1 DROP(S): 0.05 SOLUTION/ DROPS OPHTHALMIC; TOPICAL at 22:24

## 2023-12-26 RX ADMIN — DEXMEDETOMIDINE HYDROCHLORIDE IN 0.9% SODIUM CHLORIDE 22.2 MICROGRAM(S)/KG/HR: 4 INJECTION INTRAVENOUS at 05:21

## 2023-12-26 RX ADMIN — HEPARIN SODIUM 5000 UNIT(S): 5000 INJECTION INTRAVENOUS; SUBCUTANEOUS at 05:22

## 2023-12-26 RX ADMIN — Medication 4: at 22:29

## 2023-12-26 RX ADMIN — CHLORHEXIDINE GLUCONATE 15 MILLILITER(S): 213 SOLUTION TOPICAL at 05:22

## 2023-12-26 RX ADMIN — Medication 81 MILLIGRAM(S): at 15:19

## 2023-12-26 RX ADMIN — Medication 400 MILLIGRAM(S): at 14:53

## 2023-12-26 NOTE — PHYSICAL THERAPY INITIAL EVALUATION ADULT - PLANNED THERAPY INTERVENTIONS, PT EVAL
aerobic capacity/endurance/bed mobility training/gait training/neuromuscular re-education/strengthening/transfer training

## 2023-12-26 NOTE — PHYSICAL THERAPY INITIAL EVALUATION ADULT - DIAGNOSIS, PT EVAL
H I S T O R Y     Patient information was obtained from patient.  History/Exam limitations: none.  Patient presented to the Emergency Department by private vehicle.    Chief Complaint   Patient presents with   • Medical Screening Exam       HPI    Daniella Redman is a 51 year old female with a PMHx of HTN, cardiac murmur, TIA, rectal and dysfunctional uterine bleeding, anemia, and Graves disease presenting to the emergency department today for evaluation for generalized pain and vomiting. Patient was found in the parking lot by bystander laying on the ground passed out. Patient says she is hurting everywhere, and has been vomiting x3 days with associated abd pain. She relates throat pain and says she feels dizzy. Patient was seen at [facility] in [place] yesterday for vaginal bleeding. She denies CP, SOB, diarrhea, fever, chills, back pain, HA, cough, numbness/weakness, urinary or bowel changes, or any other sx. Patient denies any other modifying factors, associated sx, or concerns at this time.       Past Medical/Surgical History:  Past Medical History:   Diagnosis Date   • Cardiac murmur    • Constipation    • Dermatitis    • Dysfunctional uterine bleeding    • Eczema    • Graves disease    • Hemorrhoids    • HTN (hypertension)    • Hypothyroidism    • Insomnia    • Iron deficiency anemia    • Lumbar radiculopathy    • Premenstrual dysphoric disorder    • Proteinuria    • Rectal bleeding    • TIA (transient ischemic attack)    • Uterine leiomyoma      Medications: Reviewed  Allergies: Reviewed  Social History:  Social History     Substance and Sexual Activity   Alcohol Use No     Social History     Tobacco Use   Smoking Status Never Smoker   Smokeless Tobacco Never Used     History   Drug Use No       R E V I E W  O F  S Y S T E M S     Constitutional:  no fever, chills  Eyes: no visual complaints  ENT:  no rhinorrhea. Sore throat  Respiratory:  no cough, SOB  Cardiovascular:  no CP  GI:  Abd pain,  nausea, vomiting. No diarrhea  : no difficulty urinating  Musculoskeletal:  no back pain  Skin: no rash  Neurologic:  no headache, focal weakness, paresthesias. Dizziness  Heme: no bleed/bruise easily        P H Y S I C A L  E X A M     Visit Vitals  BP (!) 198/121 (BP Location: RUE - Right upper extremity, Patient Position: Sitting)   Pulse 82   Temp 99.1 °F (37.3 °C)   Resp 18   LMP 01/14/2022 (Approximate) Comment: patient has had her peiod for 3 weeks now   SpO2 98%       General Appearance:  Awake, alert. No acute distress  Head:  Normocephalic, atraumatic.  Eyes:  conjunctivae clear, EOMI, no scleral icterus  ENT:  External ear canals wnl, nares normal, septum midline, moist mucous membranes  Neck:  Supple, symmetrical, trachea midline    Lungs:  Clear to auscultation bilaterally without wheezing, rales, rhonchi. Respirations unlabored.  Heart:  Regular rate and rhythm, S1 and S2 normal, no murmur, rub or gallop.  Abdomen:  Soft, nondistended. LLQ tenderness to palpation. No guarding, rebound, or rigidity. No peritoneal signs.   Back:  Symmetric, no CVA tenderness  Extremities:  Atraumatic, no cyanosis or edema.  Skin:  warm, dry, no rashes or lesions.  Neurologic:  A&Ox3. CN 2-12 grossly intact. Equal strength BUE and BLE. Finger to nose without dysmetria. No pronator drift.       M E D I C A L  D E C I S I O N  M A K I N G     Daniella Redman is a 51 year old female presenting to the ED with hx and physical as above.     Multiple differential diagnoses were considered for this patient including but not limited to electrolyte abnormality vs hypertensive urgency vs diverticulitis.       Discussed with patient plan of care including diagnostics and therapeutics.  Patient agrees to course of care.  Will continue to monitor and update.     D I A G N O S T I C  R E S U L T S        Results for orders placed or performed during the hospital encounter of 09/20/22   Comprehensive Metabolic Panel   Result Value  Ref Range    Fasting Status      Sodium 138 135 - 145 mmol/L    Potassium 3.3 (L) 3.4 - 5.1 mmol/L    Chloride 102 97 - 110 mmol/L    Carbon Dioxide 28 21 - 32 mmol/L    Anion Gap 11 7 - 19 mmol/L    Glucose 107 (H) 70 - 99 mg/dL    BUN 6 6 - 20 mg/dL    Creatinine 0.70 0.51 - 0.95 mg/dL    Glomerular Filtration Rate >90 >=60    BUN/ Creatinine Ratio 9 7 - 25    Calcium 8.2 (L) 8.4 - 10.2 mg/dL    Bilirubin, Total 0.4 0.2 - 1.0 mg/dL    GOT/AST 87 (H) <=37 Units/L    GPT/ALT 74 (H) <64 Units/L    Alkaline Phosphatase 64 45 - 117 Units/L    Albumin 3.8 3.6 - 5.1 g/dL    Protein, Total 7.7 6.4 - 8.2 g/dL    Globulin 3.9 2.0 - 4.0 g/dL    A/G Ratio 1.0 1.0 - 2.4   CBC with Automated Differential (performable only)   Result Value Ref Range    WBC 8.5 4.2 - 11.0 K/mcL    RBC 3.63 (L) 4.00 - 5.20 mil/mcL    HGB 9.0 (L) 12.0 - 15.5 g/dL    HCT 27.6 (L) 36.0 - 46.5 %    MCV 76.0 (L) 78.0 - 100.0 fl    MCH 24.8 (L) 26.0 - 34.0 pg    MCHC 32.6 32.0 - 36.5 g/dL    RDW-CV 18.8 (H) 11.0 - 15.0 %    RDW-SD 51.6 (H) 39.0 - 50.0 fL     140 - 450 K/mcL    NRBC 0 <=0 /100 WBC    Neutrophil, Percent 63 %    Lymphocytes, Percent 26 %    Mono, Percent 8 %    Eosinophils, Percent 2 %    Basophils, Percent 1 %    Immature Granulocytes 0 %    Absolute Neutrophils 5.4 1.8 - 7.7 K/mcL    Absolute Lymphocytes 2.2 1.0 - 4.8 K/mcL    Absolute Monocytes 0.7 0.3 - 0.9 K/mcL    Absolute Eosinophils  0.2 0.0 - 0.5 K/mcL    Absolute Basophils 0.1 0.0 - 0.3 K/mcL    Absolute Immmature Granulocytes 0.0 0.0 - 0.2 K/mcL   POCT Urine pregnancy   Result Value Ref Range    URINE PREGNANCY,QUAL Negative Negative    Internal Procedural Controls Acceptable Yes    GLUCOSE, BEDSIDE - POINT OF CARE   Result Value Ref Range    GLUCOSE, BEDSIDE - POINT OF CARE 118 (H) 70 - 99 mg/dL       XR CHEST PA OR AP 1 VIEW   Final Result   Borderline enlarged heart.  Borderline central congestion.      Electronically Signed by: GRAYSON CARDENAS MD    Signed on: 9/20/2022  9:55 PM          CT HEAD WO CONTRAST    (Results Pending)   CT ABDOMEN PELVIS W CONTRAST - IV contrast only    (Results Pending)       E D  C O U R S E  SUMMARY     ED Course as of 09/20/22 2329   Tue Sep 20, 2022   2245 Patient care signed out to Dr. Curry pending results of CT and disposition.  [RA]   2314 HGB(!): 9.0 [SR]   2315 AST/SGOT(!): 87 [SR]   2315 ALT/SGPT(!): 74 [SR]      ED Course User Index  [RA] Florence Hitchcock  [SR] Kimberly Rodriguez,        Medications   potassium CHLORIDE (KLOR-CON) packet 40 mEq (has no administration in time range)   lidocaine viscous 2 % oral solution 15 mL (has no administration in time range)   cloNIDine (CATAPRES) tablet 0.3 mg (0.3 mg Oral Given 9/20/22 2140)   ketorolac injection 15 mg (15 mg Intravenous Given 9/20/22 2259)   ondansetron (ZOFRAN) injection 4 mg (4 mg Intravenous Given 9/20/22 2259)   dicyclomine (BENTYL) injection 20 mg (20 mg Intramuscular Given 9/20/22 2259)   iohexol (OMNIPAQUE 350 INJECT) contrast solution 100 mL (100 mLs Intravenous Given 9/20/22 2321)        C L I N I C A L  I M P R E S S I O N     ED Diagnosis   1. Syncope, unspecified syncope type     2. Abdominal pain, unspecified abdominal location         Disposition        There is no disposition no dispo time  There is no comment       Summary of your Discharge Medications      You have not been prescribed any medications.        Charting performed by ED Scribe Florence Hitchcock for Dr. Rodriguez.    The documentation recorded by the scribe accurately and completely reflects the service(s) I personally performed and the decisions made by me.           Kimberly Rodriguez DO  09/20/22  11:29 PM       Kimberly Rodriguez,   09/20/22 2329     (ICF Model) Pt. present w/deficits in Body Structures/Function (Impairments), incl: pain and resp. status, leading to deficits in performing the below noted Activities (Limitations). Additional diagnoses pending further assessment of Pt. functional mobility

## 2023-12-26 NOTE — PROGRESS NOTE ADULT - SUBJECTIVE AND OBJECTIVE BOX
INTERVAL HPI/OVERNIGHT EVENTS:       PRESSORS: [ ] YES [ ] NO  WHICH:    ANTIBIOTICS:                  DATE STARTED:  ANTIBIOTICS:                  DATE STARTED:    Antimicrobial:  azithromycin  IVPB 500 milliGRAM(s) IV Intermittent every 24 hours  cefTRIAXone   IVPB 1000 milliGRAM(s) IV Intermittent every 24 hours    Cardiovascular:    Pulmonary:  albuterol/ipratropium for Nebulization 3 milliLiter(s) Nebulizer every 6 hours  budesonide 160 MICROgram(s)/formoterol 4.5 MICROgram(s) Inhaler 2 Puff(s) Inhalation two times a day    Hematalogic:  heparin   Injectable 5000 Unit(s) SubCutaneous every 8 hours    Other:  chlorhexidine 0.12% Liquid 15 milliLiter(s) Oral Mucosa every 12 hours  dexMEDEtomidine Infusion 1 MICROgram(s)/kG/Hr IV Continuous <Continuous>  fentaNYL    Injectable 25 MICROGram(s) IV Push every 4 hours PRN  insulin lispro (ADMELOG) corrective regimen sliding scale   SubCutaneous every 6 hours  lactated ringers. 1000 milliLiter(s) IV Continuous <Continuous>  methylPREDNISolone sodium succinate Injectable 40 milliGRAM(s) IV Push every 6 hours  pantoprazole  Injectable 40 milliGRAM(s) IV Push daily    albuterol/ipratropium for Nebulization 3 milliLiter(s) Nebulizer every 6 hours  azithromycin  IVPB 500 milliGRAM(s) IV Intermittent every 24 hours  budesonide 160 MICROgram(s)/formoterol 4.5 MICROgram(s) Inhaler 2 Puff(s) Inhalation two times a day  cefTRIAXone   IVPB 1000 milliGRAM(s) IV Intermittent every 24 hours  chlorhexidine 0.12% Liquid 15 milliLiter(s) Oral Mucosa every 12 hours  dexMEDEtomidine Infusion 1 MICROgram(s)/kG/Hr IV Continuous <Continuous>  fentaNYL    Injectable 25 MICROGram(s) IV Push every 4 hours PRN  heparin   Injectable 5000 Unit(s) SubCutaneous every 8 hours  insulin lispro (ADMELOG) corrective regimen sliding scale   SubCutaneous every 6 hours  lactated ringers. 1000 milliLiter(s) IV Continuous <Continuous>  methylPREDNISolone sodium succinate Injectable 40 milliGRAM(s) IV Push every 6 hours  pantoprazole  Injectable 40 milliGRAM(s) IV Push daily    Drug Dosing Weight  Height (cm): 185.4 (06 Oct 2023 18:01)  Weight (kg): 88.7 (26 Dec 2023 07:38)  BMI (kg/m2): 25.8 (26 Dec 2023 07:38)  BSA (m2): 2.13 (26 Dec 2023 07:38)    PHYSICAL EXAM:  GENERAL: NAD  EYES: EOMI, PERRLA  NECK: Supple, No JVD; Trachea midline: No LAD   NERVOUS SYSTEM:  Alert & Oriented X3,  Motor Strength 5/5 B/L upper and lower extremities  CHEST/LUNG:  breath sounds present bilaterally, No rales, rhonchi, wheezing  HEART: Regular rate and rhythm; No murmurs, rubs, or gallops  ABDOMEN: Soft, Nontender, Nondistended; Bowel sounds present, no pain or masses on palpation  : voiding well, Amor in place  EXTREMITIES:  2+ Peripheral Pulses, No clubbing, cyanosis, or edema  SKIN: warm, intact, no lesions     LINES/DRAINS/DEVICES  CENTRAL LINE: [ ] YES [ ] NO  LOCATION:     AMOR: [ ] YES [ ] NO     A-LINE:  [ ] YES [ ] NO  LOCATION:       ICU Vital Signs Last 24 Hrs  T(C): 37.7 (26 Dec 2023 07:38), Max: 38.2 (25 Dec 2023 09:00)  T(F): 99.9 (26 Dec 2023 07:38), Max: 100.8 (25 Dec 2023 09:00)  HR: 62 (26 Dec 2023 07:38) (57 - 94)  BP: 152/89 (26 Dec 2023 07:00) (90/60 - 155/91)  BP(mean): 107 (26 Dec 2023 07:00) (67 - 114)  ABP: --  ABP(mean): --  RR: 26 (26 Dec 2023 07:38) (12 - 27)  SpO2: 98% (26 Dec 2023 07:38) (95% - 100%)    O2 Parameters below as of 26 Dec 2023 06:00  Patient On (Oxygen Delivery Method): ventilator            ABG - ( 25 Dec 2023 11:37 )  pH, Arterial: 7.45  pH, Blood: x     /  pCO2: 70    /  pO2: 80    / HCO3: 49    / Base Excess: 20.4  /  SaO2: 98                    12-25 @ 07:01  -  12-26 @ 07:00  --------------------------------------------------------  IN: 2243.6 mL / OUT: 2259 mL / NET: -15.4 mL        Mode: AC/ CMV (Assist Control/ Continuous Mandatory Ventilation)  RR (machine): 18  TV (machine): 400  FiO2: 40  PEEP: 5  ITime: 0.9  MAP: 11  PIP: 20        LABS:  CBC Full  -  ( 26 Dec 2023 03:15 )  WBC Count : 9.58 K/uL  RBC Count : 4.29 M/uL  Hemoglobin : 11.8 g/dL  Hematocrit : 38.8 %  Platelet Count - Automated : 192 K/uL  Mean Cell Volume : 90.4 fl  Mean Cell Hemoglobin : 27.5 pg  Mean Cell Hemoglobin Concentration : 30.4 gm/dL  Auto Neutrophil # : 7.99 K/uL  Auto Lymphocyte # : 1.05 K/uL  Auto Monocyte # : 0.39 K/uL  Auto Eosinophil # : 0.00 K/uL  Auto Basophil # : 0.02 K/uL  Auto Neutrophil % : 83.3 %  Auto Lymphocyte % : 11.0 %  Auto Monocyte % : 4.1 %  Auto Eosinophil % : 0.0 %  Auto Basophil % : 0.2 %    12-26    134<L>  |  94<L>  |  25<H>  ----------------------------<  245<H>  4.8   |  40<H>  |  0.64    Ca    7.9<L>      26 Dec 2023 03:15  Phos  3.1     12-26  Mg     1.6     12-26    TPro  5.6<L>  /  Alb  2.8<L>  /  TBili  0.2  /  DBili  x   /  AST  10  /  ALT  26  /  AlkPhos  44  12-25    PT/INR - ( 24 Dec 2023 20:25 )   PT: 11.0 sec;   INR: 0.96 ratio         PTT - ( 24 Dec 2023 20:25 )  PTT:29.2 sec  Urinalysis Basic - ( 26 Dec 2023 03:15 )    Color: x / Appearance: x / SG: x / pH: x  Gluc: 245 mg/dL / Ketone: x  / Bili: x / Urobili: x   Blood: x / Protein: x / Nitrite: x   Leuk Esterase: x / RBC: x / WBC x   Sq Epi: x / Non Sq Epi: x / Bacteria: x      Culture Results:   No growth at 24 hours (12-24 @ 20:20)  Culture Results:   No growth at 24 hours (12-24 @ 20:20)  Culture Results:   No growth (12-24 @ 20:20)      RADIOLOGY & ADDITIONAL STUDIES REVIEWED DURING TEAM ROUNDS    [ ]GOALS OF CARE DISCUSSION WITH PATIENT/FAMILY/PROXY:     INTERVAL HPI/OVERNIGHT EVENTS: no events overnight. pt extubated 12/26      PRESSORS: [ ] YES [x ] NO  WHICH:    ANTIBIOTICS:                  DATE STARTED:  ANTIBIOTICS:                  DATE STARTED:    Antimicrobial:  azithromycin  IVPB 500 milliGRAM(s) IV Intermittent every 24 hours  cefTRIAXone   IVPB 1000 milliGRAM(s) IV Intermittent every 24 hours    Cardiovascular:    Pulmonary:  albuterol/ipratropium for Nebulization 3 milliLiter(s) Nebulizer every 6 hours  budesonide 160 MICROgram(s)/formoterol 4.5 MICROgram(s) Inhaler 2 Puff(s) Inhalation two times a day    Hematalogic:  heparin   Injectable 5000 Unit(s) SubCutaneous every 8 hours    Other:  chlorhexidine 0.12% Liquid 15 milliLiter(s) Oral Mucosa every 12 hours  dexMEDEtomidine Infusion 1 MICROgram(s)/kG/Hr IV Continuous <Continuous>  fentaNYL    Injectable 25 MICROGram(s) IV Push every 4 hours PRN  insulin lispro (ADMELOG) corrective regimen sliding scale   SubCutaneous every 6 hours  lactated ringers. 1000 milliLiter(s) IV Continuous <Continuous>  methylPREDNISolone sodium succinate Injectable 40 milliGRAM(s) IV Push every 6 hours  pantoprazole  Injectable 40 milliGRAM(s) IV Push daily    albuterol/ipratropium for Nebulization 3 milliLiter(s) Nebulizer every 6 hours  azithromycin  IVPB 500 milliGRAM(s) IV Intermittent every 24 hours  budesonide 160 MICROgram(s)/formoterol 4.5 MICROgram(s) Inhaler 2 Puff(s) Inhalation two times a day  cefTRIAXone   IVPB 1000 milliGRAM(s) IV Intermittent every 24 hours  chlorhexidine 0.12% Liquid 15 milliLiter(s) Oral Mucosa every 12 hours  dexMEDEtomidine Infusion 1 MICROgram(s)/kG/Hr IV Continuous <Continuous>  fentaNYL    Injectable 25 MICROGram(s) IV Push every 4 hours PRN  heparin   Injectable 5000 Unit(s) SubCutaneous every 8 hours  insulin lispro (ADMELOG) corrective regimen sliding scale   SubCutaneous every 6 hours  lactated ringers. 1000 milliLiter(s) IV Continuous <Continuous>  methylPREDNISolone sodium succinate Injectable 40 milliGRAM(s) IV Push every 6 hours  pantoprazole  Injectable 40 milliGRAM(s) IV Push daily    Drug Dosing Weight  Height (cm): 185.4 (06 Oct 2023 18:01)  Weight (kg): 88.7 (26 Dec 2023 07:38)  BMI (kg/m2): 25.8 (26 Dec 2023 07:38)  BSA (m2): 2.13 (26 Dec 2023 07:38)    PHYSICAL EXAM:  GENERAL: NAD; dry mouth   EYES: EOMI, PERRLA  NECK: Supple, No JVD; Trachea midline: No LAD   NERVOUS SYSTEM:  Alert & Oriented X3,  Motor Strength 5/5 B/L upper and lower extremities  CHEST/LUNG:  breath sounds present bilaterally, No rales, rhonchi, wheezing  HEART: Regular rate and rhythm; No murmurs, rubs, or gallops  ABDOMEN: Soft, Nontender, Nondistended; Bowel sounds present, no pain or masses on palpation  : voiding well, Amor in place  EXTREMITIES:  2+ Peripheral Pulses, No clubbing, cyanosis, or edema  SKIN: warm, intact, no lesions     LINES/DRAINS/DEVICES  CENTRAL LINE: [ ] YES [x ] NO  LOCATION:     AMOR: [x ] YES [ ] NO     A-LINE:  [ ] YES [x ] NO  LOCATION:       ICU Vital Signs Last 24 Hrs  T(C): 37.7 (26 Dec 2023 07:38), Max: 38.2 (25 Dec 2023 09:00)  T(F): 99.9 (26 Dec 2023 07:38), Max: 100.8 (25 Dec 2023 09:00)  HR: 62 (26 Dec 2023 07:38) (57 - 94)  BP: 152/89 (26 Dec 2023 07:00) (90/60 - 155/91)  BP(mean): 107 (26 Dec 2023 07:00) (67 - 114)  ABP: --  ABP(mean): --  RR: 26 (26 Dec 2023 07:38) (12 - 27)  SpO2: 98% (26 Dec 2023 07:38) (95% - 100%)    O2 Parameters below as of 26 Dec 2023 06:00  Patient On (Oxygen Delivery Method): ventilator            ABG - ( 25 Dec 2023 11:37 )  pH, Arterial: 7.45  pH, Blood: x     /  pCO2: 70    /  pO2: 80    / HCO3: 49    / Base Excess: 20.4  /  SaO2: 98                    12-25 @ 07:01  -  12-26 @ 07:00  --------------------------------------------------------  IN: 2243.6 mL / OUT: 2259 mL / NET: -15.4 mL        Mode: AC/ CMV (Assist Control/ Continuous Mandatory Ventilation)  RR (machine): 18  TV (machine): 400  FiO2: 40  PEEP: 5  ITime: 0.9  MAP: 11  PIP: 20        LABS:  CBC Full  -  ( 26 Dec 2023 03:15 )  WBC Count : 9.58 K/uL  RBC Count : 4.29 M/uL  Hemoglobin : 11.8 g/dL  Hematocrit : 38.8 %  Platelet Count - Automated : 192 K/uL  Mean Cell Volume : 90.4 fl  Mean Cell Hemoglobin : 27.5 pg  Mean Cell Hemoglobin Concentration : 30.4 gm/dL  Auto Neutrophil # : 7.99 K/uL  Auto Lymphocyte # : 1.05 K/uL  Auto Monocyte # : 0.39 K/uL  Auto Eosinophil # : 0.00 K/uL  Auto Basophil # : 0.02 K/uL  Auto Neutrophil % : 83.3 %  Auto Lymphocyte % : 11.0 %  Auto Monocyte % : 4.1 %  Auto Eosinophil % : 0.0 %  Auto Basophil % : 0.2 %    12-26    134<L>  |  94<L>  |  25<H>  ----------------------------<  245<H>  4.8   |  40<H>  |  0.64    Ca    7.9<L>      26 Dec 2023 03:15  Phos  3.1     12-26  Mg     1.6     12-26    TPro  5.6<L>  /  Alb  2.8<L>  /  TBili  0.2  /  DBili  x   /  AST  10  /  ALT  26  /  AlkPhos  44  12-25    PT/INR - ( 24 Dec 2023 20:25 )   PT: 11.0 sec;   INR: 0.96 ratio         PTT - ( 24 Dec 2023 20:25 )  PTT:29.2 sec  Urinalysis Basic - ( 26 Dec 2023 03:15 )    Color: x / Appearance: x / SG: x / pH: x  Gluc: 245 mg/dL / Ketone: x  / Bili: x / Urobili: x   Blood: x / Protein: x / Nitrite: x   Leuk Esterase: x / RBC: x / WBC x   Sq Epi: x / Non Sq Epi: x / Bacteria: x      Culture Results:   No growth at 24 hours (12-24 @ 20:20)  Culture Results:   No growth at 24 hours (12-24 @ 20:20)  Culture Results:   No growth (12-24 @ 20:20)      RADIOLOGY & ADDITIONAL STUDIES REVIEWED DURING TEAM ROUNDS    [ ]GOALS OF CARE DISCUSSION WITH PATIENT/FAMILY/PROXY:     INTERVAL HPI/OVERNIGHT EVENTS: no events overnight. pt extubated 12/26      PRESSORS: [ ] YES [x ] NO  WHICH:    ANTIBIOTICS:                  DATE STARTED:  ANTIBIOTICS:                  DATE STARTED:    Antimicrobial:  cefTRIAXone   IVPB 1000 milliGRAM(s) IV Intermittent every 24 hours    Cardiovascular:    Pulmonary:  albuterol/ipratropium for Nebulization 3 milliLiter(s) Nebulizer every 6 hours  budesonide 160 MICROgram(s)/formoterol 4.5 MICROgram(s) Inhaler 2 Puff(s) Inhalation two times a day    Hematalogic:  heparin   Injectable 5000 Unit(s) SubCutaneous every 8 hours    Other:  chlorhexidine 0.12% Liquid 15 milliLiter(s) Oral Mucosa every 12 hours  dexMEDEtomidine Infusion 1 MICROgram(s)/kG/Hr IV Continuous <Continuous>  fentaNYL    Injectable 25 MICROGram(s) IV Push every 4 hours PRN  insulin lispro (ADMELOG) corrective regimen sliding scale   SubCutaneous every 6 hours  lactated ringers. 1000 milliLiter(s) IV Continuous <Continuous>  methylPREDNISolone sodium succinate Injectable 40 milliGRAM(s) IV Push every 6 hours  pantoprazole  Injectable 40 milliGRAM(s) IV Push daily    albuterol/ipratropium for Nebulization 3 milliLiter(s) Nebulizer every 6 hours  azithromycin  IVPB 500 milliGRAM(s) IV Intermittent every 24 hours  budesonide 160 MICROgram(s)/formoterol 4.5 MICROgram(s) Inhaler 2 Puff(s) Inhalation two times a day  cefTRIAXone   IVPB 1000 milliGRAM(s) IV Intermittent every 24 hours  chlorhexidine 0.12% Liquid 15 milliLiter(s) Oral Mucosa every 12 hours  dexMEDEtomidine Infusion 1 MICROgram(s)/kG/Hr IV Continuous <Continuous>  fentaNYL    Injectable 25 MICROGram(s) IV Push every 4 hours PRN  heparin   Injectable 5000 Unit(s) SubCutaneous every 8 hours  insulin lispro (ADMELOG) corrective regimen sliding scale   SubCutaneous every 6 hours  lactated ringers. 1000 milliLiter(s) IV Continuous <Continuous>  methylPREDNISolone sodium succinate Injectable 40 milliGRAM(s) IV Push every 6 hours  pantoprazole  Injectable 40 milliGRAM(s) IV Push daily    Drug Dosing Weight  Height (cm): 185.4 (06 Oct 2023 18:01)  Weight (kg): 88.7 (26 Dec 2023 07:38)  BMI (kg/m2): 25.8 (26 Dec 2023 07:38)  BSA (m2): 2.13 (26 Dec 2023 07:38)    PHYSICAL EXAM:  GENERAL: NAD; dry mouth   EYES: EOMI, PERRLA  NECK: Supple, No JVD; Trachea midline: No LAD   NERVOUS SYSTEM:  Alert & Oriented X3,  Motor Strength 5/5 B/L upper and lower extremities  CHEST/LUNG:  breath sounds present bilaterally, No rales, rhonchi, wheezing  HEART: Regular rate and rhythm; No murmurs, rubs, or gallops  ABDOMEN: Soft, Nontender, Nondistended; Bowel sounds present, no pain or masses on palpation  : voiding well, Amor in place  EXTREMITIES:  2+ Peripheral Pulses, No clubbing, cyanosis, or edema  SKIN: warm, intact, no lesions     LINES/DRAINS/DEVICES  CENTRAL LINE: [ ] YES [x ] NO  LOCATION:     AMOR: [x ] YES [ ] NO     A-LINE:  [ ] YES [x ] NO  LOCATION:       ICU Vital Signs Last 24 Hrs  T(C): 37.7 (26 Dec 2023 07:38), Max: 38.2 (25 Dec 2023 09:00)  T(F): 99.9 (26 Dec 2023 07:38), Max: 100.8 (25 Dec 2023 09:00)  HR: 62 (26 Dec 2023 07:38) (57 - 94)  BP: 152/89 (26 Dec 2023 07:00) (90/60 - 155/91)  BP(mean): 107 (26 Dec 2023 07:00) (67 - 114)  ABP: --  ABP(mean): --  RR: 26 (26 Dec 2023 07:38) (12 - 27)  SpO2: 98% (26 Dec 2023 07:38) (95% - 100%)    O2 Parameters below as of 26 Dec 2023 06:00  Patient On (Oxygen Delivery Method): ventilator            ABG - ( 25 Dec 2023 11:37 )  pH, Arterial: 7.45  pH, Blood: x     /  pCO2: 70    /  pO2: 80    / HCO3: 49    / Base Excess: 20.4  /  SaO2: 98                    12-25 @ 07:01  -  12-26 @ 07:00  --------------------------------------------------------  IN: 2243.6 mL / OUT: 2259 mL / NET: -15.4 mL        Mode: AC/ CMV (Assist Control/ Continuous Mandatory Ventilation)  RR (machine): 18  TV (machine): 400  FiO2: 40  PEEP: 5  ITime: 0.9  MAP: 11  PIP: 20        LABS:  CBC Full  -  ( 26 Dec 2023 03:15 )  WBC Count : 9.58 K/uL  RBC Count : 4.29 M/uL  Hemoglobin : 11.8 g/dL  Hematocrit : 38.8 %  Platelet Count - Automated : 192 K/uL  Mean Cell Volume : 90.4 fl  Mean Cell Hemoglobin : 27.5 pg  Mean Cell Hemoglobin Concentration : 30.4 gm/dL  Auto Neutrophil # : 7.99 K/uL  Auto Lymphocyte # : 1.05 K/uL  Auto Monocyte # : 0.39 K/uL  Auto Eosinophil # : 0.00 K/uL  Auto Basophil # : 0.02 K/uL  Auto Neutrophil % : 83.3 %  Auto Lymphocyte % : 11.0 %  Auto Monocyte % : 4.1 %  Auto Eosinophil % : 0.0 %  Auto Basophil % : 0.2 %    12-26    134<L>  |  94<L>  |  25<H>  ----------------------------<  245<H>  4.8   |  40<H>  |  0.64    Ca    7.9<L>      26 Dec 2023 03:15  Phos  3.1     12-26  Mg     1.6     12-26    TPro  5.6<L>  /  Alb  2.8<L>  /  TBili  0.2  /  DBili  x   /  AST  10  /  ALT  26  /  AlkPhos  44  12-25    PT/INR - ( 24 Dec 2023 20:25 )   PT: 11.0 sec;   INR: 0.96 ratio         PTT - ( 24 Dec 2023 20:25 )  PTT:29.2 sec  Urinalysis Basic - ( 26 Dec 2023 03:15 )    Color: x / Appearance: x / SG: x / pH: x  Gluc: 245 mg/dL / Ketone: x  / Bili: x / Urobili: x   Blood: x / Protein: x / Nitrite: x   Leuk Esterase: x / RBC: x / WBC x   Sq Epi: x / Non Sq Epi: x / Bacteria: x      Culture Results:   No growth at 24 hours (12-24 @ 20:20)  Culture Results:   No growth at 24 hours (12-24 @ 20:20)  Culture Results:   No growth (12-24 @ 20:20)      RADIOLOGY & ADDITIONAL STUDIES REVIEWED DURING TEAM ROUNDS    [ ]GOALS OF CARE DISCUSSION WITH PATIENT/FAMILY/PROXY:

## 2023-12-26 NOTE — PHYSICAL THERAPY INITIAL EVALUATION ADULT - AMBULATION SKILLS, REHAB EVAL
pt reports he gets OOB and walks to his wheelchair and then rolls himself around facility/independent

## 2023-12-26 NOTE — PHYSICAL THERAPY INITIAL EVALUATION ADULT - LEVEL OF INDEPENDENCE: SUPINE/SIT, REHAB EVAL
Pt deferred sitting EOB at this time, despite education of benefits, importance of activity, and risks of limited mobility. He eventually agreed to be sitting up at follow up session. He reported this was due to his back and shoulder pain (RN and NP made aware)

## 2023-12-26 NOTE — PHYSICAL THERAPY INITIAL EVALUATION ADULT - IMPAIRMENTS FOUND, PT EVAL
Your home health has been arranged with [facility]. They will call to schedule an appointment. For concerns call them at [phone]    Additional diagnoses pending further assessment of Pt. functional mobility/aerobic capacity/endurance/ventilation and respiration/gas exchange

## 2023-12-26 NOTE — PHYSICAL THERAPY INITIAL EVALUATION ADULT - MANUAL MUSCLE TESTING RESULTS, REHAB EVAL
at least 3/5 extremities, pt did not weas agreeable to formal MMT testing at this time, but did perform ROM

## 2023-12-26 NOTE — PHYSICAL THERAPY INITIAL EVALUATION ADULT - GENERAL OBSERVATIONS, REHAB EVAL
Consult received, chart reviewed. Patient received supine in bed, NAD, + Cardiac and SPo2 monitoring, +mark, SCDs, IV, NC 2 3l/min. Patient agreed to limited assessment from Physical Therapist.

## 2023-12-26 NOTE — CONSULT NOTE ADULT - SUBJECTIVE AND OBJECTIVE BOX
Time of visit:    CHIEF COMPLAINT: Patient is a 63y old  Male who presents with a chief complaint of Acute on chronic hypoxic hypercapnic respiratory failure 2/2 Suspected COPD exacerbation (26 Dec 2023 07:38)      HPI: This  is a 63 yr man  from [facility], active smoker, with  CAD, CHF on nocturnal BiPAP COPD ( MULTIPLE intubations in past on 3L of home O2), peripheral neuropathy, GERD, HTN, HLD, obesity, polyarthritis, Pulmonary HTN, TIA, Vit D Def, presents with hypoxemia from nursing facility and lethargy. Patient unable to provide medical history. As per ED, documentation patient had shortness of breath, no further information.     Upon chart review, patient was admitted to [facility] ICU in October 23 due to acute on chronic hypoxic hypercapnic respiratory failure. Patient was eventually weaned off ventilator and then discharged home. Patient was also recently admitted to [facility] where he was intubated there as well. Patient' sister Chiquita Calderon [phone] contacted and updated by icu team .           PAST MEDICAL & SURGICAL HISTORY:  COPD (chronic obstructive pulmonary disease)  chronic hypoxic resp failure due to COPD requiring supp Oxygen 2-3L  Stented coronary artery  2017  HLD (hyperlipidemia)  Pulmonary HTN  Type 2 diabetes mellitus without complication, with long-term current use of insulin  DM (diabetes mellitus)  CAD (coronary artery disease)  GERD (gastroesophageal reflux disease)  Insomnia  CHF (congestive heart failure)  HTN (hypertension)  Mood disorder  No significant past surgical history  Allergies  No Known Allergies  Intolerances    MEDICATIONS  (STANDING):  albuterol/ipratropium for Nebulization 3 milliLiter(s) Nebulizer every 6 hours  aspirin enteric coated 81 milliGRAM(s) Oral daily  budesonide 160 MICROgram(s)/formoterol 4.5 MICROgram(s) Inhaler 2 Puff(s) Inhalation two times a day  cefTRIAXone   IVPB 1000 milliGRAM(s) IV Intermittent every 24 hours  dextrose 50% Injectable 25 Gram(s) IV Push once  finasteride 5 milliGRAM(s) Oral daily  gabapentin 100 milliGRAM(s) Oral three times a day  glucagon  Injectable 1 milliGRAM(s) IntraMuscular once  heparin   Injectable 5000 Unit(s) SubCutaneous every 8 hours  insulin glargine Injectable (LANTUS) 15 Unit(s) SubCutaneous at bedtime  insulin lispro (ADMELOG) corrective regimen sliding scale   SubCutaneous three times a day before meals  insulin lispro (ADMELOG) corrective regimen sliding scale   SubCutaneous at bedtime  insulin lispro Injectable (ADMELOG) 5 Unit(s) SubCutaneous three times a day before meals  latanoprost 0.005% Ophthalmic Solution 1 Drop(s) Both EYES at bedtime  lidocaine   4% Patch 1 Patch Transdermal daily  methylPREDNISolone sodium succinate Injectable 40 milliGRAM(s) IV Push every 6 hours  pantoprazole  Injectable 40 milliGRAM(s) IV Push daily  tamsulosin 0.4 milliGRAM(s) Oral at bedtime  traZODone 150 milliGRAM(s) Oral daily      MEDICATIONS  (PRN):   Medications up to date at time of exam.    Medications up to date at time of exam.    FAMILY HISTORY:  No family history of hypertension    No family history of mental disorder    FH: myocardial infarction (Mother)    No family history of mental disorder        SOCIAL HISTORY  Smoking History: [ x  ] smoking/smoke exposure, [   ] former smoker  Living Condition: [   ] apartment, [   ] private house  Work History:   Travel History: denies recent travel  Illicit Substance Use: denies  Alcohol Use: denies    REVIEW OF SYSTEMS: unable to obtain due to encephalopathy     CONSTITUTIONAL:  denies fevers, chills, sweats, weight loss    HEENT:  denies diplopia or blurred vision, sore throat or runny nose.    CARDIOVASCULAR:  denies pressure, squeezing, tightness, or heaviness about the chest; no palpitations.    RESPIRATORY:  denies SOB, cough, GIORDANO, wheezing.    GASTROINTESTINAL:  denies abdominal pain, nausea, vomiting or diarrhea.    GENITOURINARY: denies dysuria, frequency or urgency.    NEUROLOGIC:  denies numbness, tingling, seizures or weakness.    PSYCHIATRIC:  denies disorder of thought or mood.    MSK: denies swelling, redness      PHYSICAL EXAMINATION:    GENERAL: The patient is a well-developed, well-nourished, in no apparent distress.     Vital Signs Last 24 Hrs  T(C): 37.6 (26 Dec 2023 15:00), Max: 37.7 (26 Dec 2023 00:00)  T(F): 99.7 (26 Dec 2023 15:00), Max: 99.9 (26 Dec 2023 00:00)  HR: 82 (26 Dec 2023 15:00) (57 - 88)  BP: 100/58 (26 Dec 2023 15:00) (95/58 - 155/91)  BP(mean): 72 (26 Dec 2023 15:00) (70 - 114)  RR: 13 (26 Dec 2023 15:00) (13 - 31)  SpO2: 94% (26 Dec 2023 15:00) (90% - 100%)    Parameters below as of 26 Dec 2023 12:00  Patient On (Oxygen Delivery Method): nasal cannula  O2 Flow (L/min): 3    Mode: AC/ CMV (Assist Control/ Continuous Mandatory Ventilation)  RR (machine): 18  TV (machine): 400  FiO2: 40  PEEP: 5  ITime: 0.9  MAP: 11  PIP: 22   (if applicable)    Chest Tube (if applicable)    HEENT: Head is normocephalic and atraumatic. Extraocular muscles are intact. Mucous membranes are moist.     NECK: Supple, no palpable adenopathy.    LUNGS: Clear to auscultation, no wheezing, rales, or rhonchi.    HEART: Regular rate and rhythm without murmur.    ABDOMEN: Soft, nontender, and nondistended.  No hepatosplenomegaly is noted.    RENAL: No difficulty voiding, no pelvic pain    EXTREMITIES: Without any cyanosis, clubbing, rash, lesions or edema.    NEUROLOGIC: Awake, alert, oriented, grossly intact    SKIN: Warm, dry, good turgor.      LABS:                        11.8   9.58  )-----------( 192      ( 26 Dec 2023 03:15 )             38.8     12-26    134<L>  |  94<L>  |  25<H>  ----------------------------<  245<H>  4.8   |  40<H>  |  0.64    Ca    7.9<L>      26 Dec 2023 03:15  Phos  3.1     12-26  Mg     1.6     12-26    TPro  5.6<L>  /  Alb  2.8<L>  /  TBili  0.2  /  DBili  x   /  AST  10  /  ALT  26  /  AlkPhos  44  12-25    PT/INR - ( 24 Dec 2023 20:25 )   PT: 11.0 sec;   INR: 0.96 ratio         PTT - ( 24 Dec 2023 20:25 )  PTT:29.2 sec  Urinalysis Basic - ( 26 Dec 2023 03:15 )    Color: x / Appearance: x / SG: x / pH: x  Gluc: 245 mg/dL / Ketone: x  / Bili: x / Urobili: x   Blood: x / Protein: x / Nitrite: x   Leuk Esterase: x / RBC: x / WBC x   Sq Epi: x / Non Sq Epi: x / Bacteria: x      ABG - ( 25 Dec 2023 11:37 )  pH, Arterial: 7.45  pH, Blood: x     /  pCO2: 70    /  pO2: 80    / HCO3: 49    / Base Excess: 20.4  /  SaO2: 98        MICROBIOLOGY: (if applicable)    RADIOLOGY & ADDITIONAL STUDIES:  EKG:   CXR:< from: Xray Chest 1 View- PORTABLE-Urgent (Xray Chest 1 View- PORTABLE-Urgent .) (12.24.23 @ 22:27) >    ACC: [id number] EXAM:  XR CHEST PORTABLE URGENT 1V   ORDERED BY: GREG MONTOYA     PROCEDURE DATE:  12/24/2023          INTERPRETATION:  Chest radiograph (one view)     CPT 53701    CLINICAL INFORMATION:  Assess ET tube placement.  No additional   information is provided.    TECHNIQUE:  Single frontal view of the chest was obtained.    FINDINGS:  Prior study of earlier the same day was available for review.    The lungs demonstrate patchy bilateral lower lobe airspace opacities.   Small right pleuraleffusion is noted. Endotracheal tube is noted in   satisfactory position above the angela.    The heart and mediastinum appear intact.  Fullness of both hilar regions   is noted.      IMPRESSION: Patchy bilateral lower lobe airspace opacities are noted.   Small right pleural effusion is seen. Endotracheal tube noted in   satisfactory position above the angela.    --- End of Report ---            MARYANNE OLIVARES MD; Attending Radiologist  This document has been electronically signed. Dec 25 69366:[id number]    < end of copied text >    ECHO:    IMPRESSION: 63y Male PAST MEDICAL & SURGICAL HISTORY:  COPD (chronic obstructive pulmonary disease)  Oxygen 2-3L at home      Stented coronary artery  2017      HLD (hyperlipidemia)      Pulmonary HTN      Type 2 diabetes mellitus without complication, with long-term current use of insulin      DM (diabetes mellitus)      CAD (coronary artery disease)      GERD (gastroesophageal reflux disease)      Insomnia      CHF (congestive heart failure)      HTN (hypertension)      Mood disorder      No significant past surgical history       p/w     IMP:  This is a 63 yr old man with chronci hypoxic resp failure due to  COPD requiring supp O2  oxygen 3L / min  CAD, CHF presented to the ED with worsening SOB with respiratory distress.. In the ED he was found to be in copd exacerbation and in acute on chronic hypercapnic respiratory failure and he was intubated and placed on mechanical ventilation by the ED team. Soon after intubation he became hypotensive and was resuscitated with IV fluid boluses with NS to at least 4-5Liters.  At time of ICU evaluation, patient orally intubated  on mech vent and peripheral levophed on-going to maintain MAP >65. Patient is accepted to ICU for further management and closer monitoring.  Viral studies neg       ASSESSMENT   - Acute on chronic hypercapnic respiratory failure from COPD exacerbation s/p intubation.  - Acute exacerbation of COPD.  - Hypovolemia from dehydrated state,  - Right sided pneumonia  - Shock- hypovolemic, cardiogenic   - Hypovolemic hyponatremia, mild  - HTN CAD   - CHF   - Pulmonary HTN,      Sugg :     - Pat was extubated earlier today   - No sedation   - Monitor pulse oximetry and supp as needed to maintain sat >90%  - Decrease solumedrol 40 mg /day   - Albuterol/Atrovent nebulizer q6hrs  - Bipap at night and as needed, on home bipap support  - CT chest non contrast to eval b/l full emre   - DVT / GI prophy     discussed with ICU team and attend      Time of visit:    CHIEF COMPLAINT: Patient is a 63y old  Male who presents with a chief complaint of Acute on chronic hypoxic hypercapnic respiratory failure 2/2 Suspected COPD exacerbation (26 Dec 2023 07:38)      HPI: This  is a 63 yr man  from [facility], active smoker, with  CAD, CHF on nocturnal BiPAP COPD ( MULTIPLE intubations in past on 3L of home O2), peripheral neuropathy, GERD, HTN, HLD, obesity, polyarthritis, Pulmonary HTN, TIA, Vit D Def, presents with hypoxemia from nursing facility and lethargy. Patient unable to provide medical history. As per ED, documentation patient had shortness of breath, no further information.     Upon chart review, patient was admitted to [facility] ICU in October 23 due to acute on chronic hypoxic hypercapnic respiratory failure. Patient was eventually weaned off ventilator and then discharged home. Patient was also recently admitted to [facility] where he was intubated there as well. Patient' sister Chiquita Calderon [phone] contacted and updated by icu team .           PAST MEDICAL & SURGICAL HISTORY:  COPD (chronic obstructive pulmonary disease)  chronic hypoxic resp failure due to COPD requiring supp Oxygen 2-3L  Stented coronary artery  2017  HLD (hyperlipidemia)  Pulmonary HTN  Type 2 diabetes mellitus without complication, with long-term current use of insulin  DM (diabetes mellitus)  CAD (coronary artery disease)  GERD (gastroesophageal reflux disease)  Insomnia  CHF (congestive heart failure)  HTN (hypertension)  Mood disorder  No significant past surgical history  Allergies  No Known Allergies  Intolerances    MEDICATIONS  (STANDING):  albuterol/ipratropium for Nebulization 3 milliLiter(s) Nebulizer every 6 hours  aspirin enteric coated 81 milliGRAM(s) Oral daily  budesonide 160 MICROgram(s)/formoterol 4.5 MICROgram(s) Inhaler 2 Puff(s) Inhalation two times a day  cefTRIAXone   IVPB 1000 milliGRAM(s) IV Intermittent every 24 hours  dextrose 50% Injectable 25 Gram(s) IV Push once  finasteride 5 milliGRAM(s) Oral daily  gabapentin 100 milliGRAM(s) Oral three times a day  glucagon  Injectable 1 milliGRAM(s) IntraMuscular once  heparin   Injectable 5000 Unit(s) SubCutaneous every 8 hours  insulin glargine Injectable (LANTUS) 15 Unit(s) SubCutaneous at bedtime  insulin lispro (ADMELOG) corrective regimen sliding scale   SubCutaneous three times a day before meals  insulin lispro (ADMELOG) corrective regimen sliding scale   SubCutaneous at bedtime  insulin lispro Injectable (ADMELOG) 5 Unit(s) SubCutaneous three times a day before meals  latanoprost 0.005% Ophthalmic Solution 1 Drop(s) Both EYES at bedtime  lidocaine   4% Patch 1 Patch Transdermal daily  methylPREDNISolone sodium succinate Injectable 40 milliGRAM(s) IV Push every 6 hours  pantoprazole  Injectable 40 milliGRAM(s) IV Push daily  tamsulosin 0.4 milliGRAM(s) Oral at bedtime  traZODone 150 milliGRAM(s) Oral daily      MEDICATIONS  (PRN):   Medications up to date at time of exam.    Medications up to date at time of exam.    FAMILY HISTORY:  No family history of hypertension    No family history of mental disorder    FH: myocardial infarction (Mother)    No family history of mental disorder        SOCIAL HISTORY  Smoking History: [ x  ] smoking/smoke exposure, [   ] former smoker  Living Condition: [   ] apartment, [   ] private house  Work History:   Travel History: denies recent travel  Illicit Substance Use: denies  Alcohol Use: denies    REVIEW OF SYSTEMS: unable to obtain due to encephalopathy     CONSTITUTIONAL:  denies fevers, chills, sweats, weight loss    HEENT:  denies diplopia or blurred vision, sore throat or runny nose.    CARDIOVASCULAR:  denies pressure, squeezing, tightness, or heaviness about the chest; no palpitations.    RESPIRATORY:  denies SOB, cough, GIORDANO, wheezing.    GASTROINTESTINAL:  denies abdominal pain, nausea, vomiting or diarrhea.    GENITOURINARY: denies dysuria, frequency or urgency.    NEUROLOGIC:  denies numbness, tingling, seizures or weakness.    PSYCHIATRIC:  denies disorder of thought or mood.    MSK: denies swelling, redness      PHYSICAL EXAMINATION:    GENERAL: The patient is a well-developed, well-nourished, in no apparent distress.     Vital Signs Last 24 Hrs  T(C): 37.6 (26 Dec 2023 15:00), Max: 37.7 (26 Dec 2023 00:00)  T(F): 99.7 (26 Dec 2023 15:00), Max: 99.9 (26 Dec 2023 00:00)  HR: 82 (26 Dec 2023 15:00) (57 - 88)  BP: 100/58 (26 Dec 2023 15:00) (95/58 - 155/91)  BP(mean): 72 (26 Dec 2023 15:00) (70 - 114)  RR: 13 (26 Dec 2023 15:00) (13 - 31)  SpO2: 94% (26 Dec 2023 15:00) (90% - 100%)    Parameters below as of 26 Dec 2023 12:00  Patient On (Oxygen Delivery Method): nasal cannula  O2 Flow (L/min): 3    Mode: AC/ CMV (Assist Control/ Continuous Mandatory Ventilation)  RR (machine): 18  TV (machine): 400  FiO2: 40  PEEP: 5  ITime: 0.9  MAP: 11  PIP: 22   (if applicable)    Chest Tube (if applicable)    HEENT: Head is normocephalic and atraumatic. Extraocular muscles are intact. Mucous membranes are moist.     NECK: Supple, no palpable adenopathy.    LUNGS: Clear to auscultation, no wheezing, rales, or rhonchi.    HEART: Regular rate and rhythm without murmur.    ABDOMEN: Soft, nontender, and nondistended.  No hepatosplenomegaly is noted.    RENAL: No difficulty voiding, no pelvic pain    EXTREMITIES: Without any cyanosis, clubbing, rash, lesions or edema.    NEUROLOGIC: Awake, alert, oriented, grossly intact    SKIN: Warm, dry, good turgor.      LABS:                        11.8   9.58  )-----------( 192      ( 26 Dec 2023 03:15 )             38.8     12-26    134<L>  |  94<L>  |  25<H>  ----------------------------<  245<H>  4.8   |  40<H>  |  0.64    Ca    7.9<L>      26 Dec 2023 03:15  Phos  3.1     12-26  Mg     1.6     12-26    TPro  5.6<L>  /  Alb  2.8<L>  /  TBili  0.2  /  DBili  x   /  AST  10  /  ALT  26  /  AlkPhos  44  12-25    PT/INR - ( 24 Dec 2023 20:25 )   PT: 11.0 sec;   INR: 0.96 ratio         PTT - ( 24 Dec 2023 20:25 )  PTT:29.2 sec  Urinalysis Basic - ( 26 Dec 2023 03:15 )    Color: x / Appearance: x / SG: x / pH: x  Gluc: 245 mg/dL / Ketone: x  / Bili: x / Urobili: x   Blood: x / Protein: x / Nitrite: x   Leuk Esterase: x / RBC: x / WBC x   Sq Epi: x / Non Sq Epi: x / Bacteria: x      ABG - ( 25 Dec 2023 11:37 )  pH, Arterial: 7.45  pH, Blood: x     /  pCO2: 70    /  pO2: 80    / HCO3: 49    / Base Excess: 20.4  /  SaO2: 98        MICROBIOLOGY: (if applicable)    RADIOLOGY & ADDITIONAL STUDIES:  EKG:   CXR:< from: Xray Chest 1 View- PORTABLE-Urgent (Xray Chest 1 View- PORTABLE-Urgent .) (12.24.23 @ 22:27) >    ACC: [id number] EXAM:  XR CHEST PORTABLE URGENT 1V   ORDERED BY: GREG MONTOYA     PROCEDURE DATE:  12/24/2023          INTERPRETATION:  Chest radiograph (one view)     CPT 47124    CLINICAL INFORMATION:  Assess ET tube placement.  No additional   information is provided.    TECHNIQUE:  Single frontal view of the chest was obtained.    FINDINGS:  Prior study of earlier the same day was available for review.    The lungs demonstrate patchy bilateral lower lobe airspace opacities.   Small right pleuraleffusion is noted. Endotracheal tube is noted in   satisfactory position above the angela.    The heart and mediastinum appear intact.  Fullness of both hilar regions   is noted.      IMPRESSION: Patchy bilateral lower lobe airspace opacities are noted.   Small right pleural effusion is seen. Endotracheal tube noted in   satisfactory position above the angela.    --- End of Report ---            MARYANNE OLIVARES MD; Attending Radiologist  This document has been electronically signed. Dec 25 11437:55AM    < end of copied text >    ECHO:    IMPRESSION: 63y Male PAST MEDICAL & SURGICAL HISTORY:  COPD (chronic obstructive pulmonary disease)  Oxygen 2-3L at home      Stented coronary artery  2017      HLD (hyperlipidemia)      Pulmonary HTN      Type 2 diabetes mellitus without complication, with long-term current use of insulin      DM (diabetes mellitus)      CAD (coronary artery disease)      GERD (gastroesophageal reflux disease)      Insomnia      CHF (congestive heart failure)      HTN (hypertension)      Mood disorder      No significant past surgical history       p/w     IMP:  This is a 63 yr old man with chronci hypoxic resp failure due to  COPD requiring supp O2  oxygen 3L / min  CAD, CHF presented to the ED with worsening SOB with respiratory distress.. In the ED he was found to be in copd exacerbation and in acute on chronic hypercapnic respiratory failure and he was intubated and placed on mechanical ventilation by the ED team. Soon after intubation he became hypotensive and was resuscitated with IV fluid boluses with NS to at least 4-5Liters.  At time of ICU evaluation, patient orally intubated  on mech vent and peripheral levophed on-going to maintain MAP >65. Patient is accepted to ICU for further management and closer monitoring.  Viral studies neg       ASSESSMENT   - Acute on chronic hypercapnic respiratory failure from COPD exacerbation s/p intubation.  - Acute exacerbation of COPD.  - Hypovolemia from dehydrated state,  - Right sided pneumonia  - Shock- hypovolemic, cardiogenic   - Hypovolemic hyponatremia, mild  - HTN CAD   - CHF   - Pulmonary HTN,      Sugg :     - Pat was extubated earlier today   - No sedation   - Monitor pulse oximetry and supp as needed to maintain sat >90%  - Decrease solumedrol 40 mg /day   - Albuterol/Atrovent nebulizer q6hrs  - Bipap at night and as needed, on home bipap support  - CT chest non contrast to eval b/l full emre   - DVT / GI prophy     discussed with ICU team and attend      Time of visit:    CHIEF COMPLAINT: Patient is a 63y old  Male who presents with a chief complaint of Acute on chronic hypoxic hypercapnic respiratory failure 2/2 Suspected COPD exacerbation (26 Dec 2023 07:38)      HPI: This  is a 63 yr man  from [facility], active smoker, with  CAD, CHF on nocturnal BiPAP COPD ( MULTIPLE intubations in past on 3L of home O2), peripheral neuropathy, GERD, HTN, HLD, obesity, polyarthritis, Pulmonary HTN, TIA, Vit D Def, presents with hypoxemia from nursing facility and lethargy. Patient unable to provide medical history. As per ED, documentation patient had shortness of breath, no further information.     Upon chart review, patient was admitted to [facility] ICU in October 23 due to acute on chronic hypoxic hypercapnic respiratory failure. Patient was eventually weaned off ventilator and then discharged home. Patient was also recently admitted to [facility] where he was intubated there as well. Patient' sister Chiquita Calderon [phone] contacted and updated by icu team .           PAST MEDICAL & SURGICAL HISTORY:  COPD (chronic obstructive pulmonary disease)  chronic hypoxic resp failure due to COPD requiring supp Oxygen 2-3L  Stented coronary artery  2017  HLD (hyperlipidemia)  Pulmonary HTN  Type 2 diabetes mellitus without complication, with long-term current use of insulin  DM (diabetes mellitus)  CAD (coronary artery disease)  GERD (gastroesophageal reflux disease)  Insomnia  CHF (congestive heart failure)  HTN (hypertension)  Mood disorder  No significant past surgical history  Allergies  No Known Allergies  Intolerances    MEDICATIONS  (STANDING):  albuterol/ipratropium for Nebulization 3 milliLiter(s) Nebulizer every 6 hours  aspirin enteric coated 81 milliGRAM(s) Oral daily  budesonide 160 MICROgram(s)/formoterol 4.5 MICROgram(s) Inhaler 2 Puff(s) Inhalation two times a day  cefTRIAXone   IVPB 1000 milliGRAM(s) IV Intermittent every 24 hours  dextrose 50% Injectable 25 Gram(s) IV Push once  finasteride 5 milliGRAM(s) Oral daily  gabapentin 100 milliGRAM(s) Oral three times a day  glucagon  Injectable 1 milliGRAM(s) IntraMuscular once  heparin   Injectable 5000 Unit(s) SubCutaneous every 8 hours  insulin glargine Injectable (LANTUS) 15 Unit(s) SubCutaneous at bedtime  insulin lispro (ADMELOG) corrective regimen sliding scale   SubCutaneous three times a day before meals  insulin lispro (ADMELOG) corrective regimen sliding scale   SubCutaneous at bedtime  insulin lispro Injectable (ADMELOG) 5 Unit(s) SubCutaneous three times a day before meals  latanoprost 0.005% Ophthalmic Solution 1 Drop(s) Both EYES at bedtime  lidocaine   4% Patch 1 Patch Transdermal daily  methylPREDNISolone sodium succinate Injectable 40 milliGRAM(s) IV Push every 6 hours  pantoprazole  Injectable 40 milliGRAM(s) IV Push daily  tamsulosin 0.4 milliGRAM(s) Oral at bedtime  traZODone 150 milliGRAM(s) Oral daily      MEDICATIONS  (PRN):   Medications up to date at time of exam.    Medications up to date at time of exam.    FAMILY HISTORY:  No family history of hypertension    No family history of mental disorder    FH: myocardial infarction (Mother)    No family history of mental disorder        SOCIAL HISTORY  Smoking History: [ x  ] smoking/smoke exposure, [   ] former smoker  Living Condition: [   ] apartment, [   ] private house  Work History:   Travel History: denies recent travel  Illicit Substance Use: denies  Alcohol Use: denies    REVIEW OF SYSTEMS: unable to obtain due to encephalopathy     CONSTITUTIONAL:  denies fevers, chills, sweats, weight loss    HEENT:  denies diplopia or blurred vision, sore throat or runny nose.    CARDIOVASCULAR:  denies pressure, squeezing, tightness, or heaviness about the chest; no palpitations.    RESPIRATORY:  denies SOB, cough, GIORDANO, wheezing.    GASTROINTESTINAL:  denies abdominal pain, nausea, vomiting or diarrhea.    GENITOURINARY: denies dysuria, frequency or urgency.    NEUROLOGIC:  denies numbness, tingling, seizures or weakness.    PSYCHIATRIC:  denies disorder of thought or mood.    MSK: denies swelling, redness      PHYSICAL EXAMINATION:    GENERAL: The patient is a well-developed, well-nourished, in no apparent distress.     Vital Signs Last 24 Hrs  T(C): 37.6 (26 Dec 2023 15:00), Max: 37.7 (26 Dec 2023 00:00)  T(F): 99.7 (26 Dec 2023 15:00), Max: 99.9 (26 Dec 2023 00:00)  HR: 82 (26 Dec 2023 15:00) (57 - 88)  BP: 100/58 (26 Dec 2023 15:00) (95/58 - 155/91)  BP(mean): 72 (26 Dec 2023 15:00) (70 - 114)  RR: 13 (26 Dec 2023 15:00) (13 - 31)  SpO2: 94% (26 Dec 2023 15:00) (90% - 100%)    Parameters below as of 26 Dec 2023 12:00  Patient On (Oxygen Delivery Method): nasal cannula  O2 Flow (L/min): 3    Mode: AC/ CMV (Assist Control/ Continuous Mandatory Ventilation)  RR (machine): 18  TV (machine): 400  FiO2: 40  PEEP: 5  ITime: 0.9  MAP: 11  PIP: 22   (if applicable)    Chest Tube (if applicable)    HEENT: Head is normocephalic and atraumatic. Extraocular muscles are intact. Mucous membranes are moist.     NECK: Supple, no palpable adenopathy.    LUNGS: Clear to auscultation, no wheezing, rales, or rhonchi.    HEART: Regular rate and rhythm without murmur.    ABDOMEN: Soft, nontender, and nondistended.  No hepatosplenomegaly is noted.    RENAL: No difficulty voiding, no pelvic pain    EXTREMITIES: Without any cyanosis, clubbing, rash, lesions or edema.    NEUROLOGIC: Awake, alert, oriented, grossly intact    SKIN: Warm, dry, good turgor.      LABS:                        11.8   9.58  )-----------( 192      ( 26 Dec 2023 03:15 )             38.8     12-26    134<L>  |  94<L>  |  25<H>  ----------------------------<  245<H>  4.8   |  40<H>  |  0.64    Ca    7.9<L>      26 Dec 2023 03:15  Phos  3.1     12-26  Mg     1.6     12-26    TPro  5.6<L>  /  Alb  2.8<L>  /  TBili  0.2  /  DBili  x   /  AST  10  /  ALT  26  /  AlkPhos  44  12-25    PT/INR - ( 24 Dec 2023 20:25 )   PT: 11.0 sec;   INR: 0.96 ratio         PTT - ( 24 Dec 2023 20:25 )  PTT:29.2 sec  Urinalysis Basic - ( 26 Dec 2023 03:15 )    Color: x / Appearance: x / SG: x / pH: x  Gluc: 245 mg/dL / Ketone: x  / Bili: x / Urobili: x   Blood: x / Protein: x / Nitrite: x   Leuk Esterase: x / RBC: x / WBC x   Sq Epi: x / Non Sq Epi: x / Bacteria: x      ABG - ( 25 Dec 2023 11:37 )  pH, Arterial: 7.45  pH, Blood: x     /  pCO2: 70    /  pO2: 80    / HCO3: 49    / Base Excess: 20.4  /  SaO2: 98        MICROBIOLOGY: (if applicable)    RADIOLOGY & ADDITIONAL STUDIES:  EKG:   CXR:< from: Xray Chest 1 View- PORTABLE-Urgent (Xray Chest 1 View- PORTABLE-Urgent .) (12.24.23 @ 22:27) >    ACC: [id number] EXAM:  XR CHEST PORTABLE URGENT 1V   ORDERED BY: GREG MONTOYA     PROCEDURE DATE:  12/24/2023          INTERPRETATION:  Chest radiograph (one view)     CPT 95658    CLINICAL INFORMATION:  Assess ET tube placement.  No additional   information is provided.    TECHNIQUE:  Single frontal view of the chest was obtained.    FINDINGS:  Prior study of earlier the same day was available for review.    The lungs demonstrate patchy bilateral lower lobe airspace opacities.   Small right pleuraleffusion is noted. Endotracheal tube is noted in   satisfactory position above the angela.    The heart and mediastinum appear intact.  Fullness of both hilar regions   is noted.      IMPRESSION: Patchy bilateral lower lobe airspace opacities are noted.   Small right pleural effusion is seen. Endotracheal tube noted in   satisfactory position above the angela.    --- End of Report ---            MARYANNE OLIVARES MD; Attending Radiologist  This document has been electronically signed. Dec 25 24747:[id number]    < end of copied text >    ECHO:    IMPRESSION: 63y Male PAST MEDICAL & SURGICAL HISTORY:  COPD (chronic obstructive pulmonary disease)  Oxygen 2-3L at home      Stented coronary artery  2017      HLD (hyperlipidemia)      Pulmonary HTN      Type 2 diabetes mellitus without complication, with long-term current use of insulin      DM (diabetes mellitus)      CAD (coronary artery disease)      GERD (gastroesophageal reflux disease)      Insomnia      CHF (congestive heart failure)      HTN (hypertension)      Mood disorder      No significant past surgical history       p/w     IMP:  This is a 63 yr old man with chronci hypoxic resp failure due to  COPD requiring supp O2  oxygen 3L / min  CAD, CHF presented to the ED with worsening SOB with respiratory distress.. In the ED he was found to be in copd exacerbation and in acute on chronic hypercapnic respiratory failure and he was intubated and placed on mechanical ventilation by the ED team. Soon after intubation he became hypotensive and was resuscitated with IV fluid boluses with NS to at least 4-5Liters.  At time of ICU evaluation, patient orally intubated  on mech vent and peripheral levophed on-going to maintain MAP >65. Patient is accepted to ICU for further management and closer monitoring.  Viral studies neg       ASSESSMENT   - Acute on chronic hypercapnic respiratory failure from COPD exacerbation s/p intubation.  - Acute exacerbation of COPD.  - Hypovolemia from dehydrated state,  - Right sided pneumonia  - Shock- hypovolemic, cardiogenic   - Hypovolemic hyponatremia, mild  - HTN CAD   - CHF   - Pulmonary HTN,      Sugg :     - Pat was extubated earlier today   - No sedation   - Monitor pulse oximetry and supp as needed to maintain sat >90%  - Decrease solumedrol 40 mg /day   - Albuterol/Atrovent nebulizer q6hrs  - Antibx as per ICU team   - Bipap at night and as needed, on home bipap support  - CT chest non contrast to eval b/l full emre   - DVT / GI prophy     discussed with ICU team and attend      Time of visit:    CHIEF COMPLAINT: Patient is a 63y old  Male who presents with a chief complaint of Acute on chronic hypoxic hypercapnic respiratory failure 2/2 Suspected COPD exacerbation (26 Dec 2023 07:38)      HPI: This  is a 63 yr man  from [facility], active smoker, with  CAD, CHF on nocturnal BiPAP COPD ( MULTIPLE intubations in past on 3L of home O2), peripheral neuropathy, GERD, HTN, HLD, obesity, polyarthritis, Pulmonary HTN, TIA, Vit D Def, presents with hypoxemia from nursing facility and lethargy. Patient unable to provide medical history. As per ED, documentation patient had shortness of breath, no further information.     Upon chart review, patient was admitted to [facility] ICU in October 23 due to acute on chronic hypoxic hypercapnic respiratory failure. Patient was eventually weaned off ventilator and then discharged home. Patient was also recently admitted to [facility] where he was intubated there as well. Patient' sister Chiquita Calderon [phone] contacted and updated by icu team .           PAST MEDICAL & SURGICAL HISTORY:  COPD (chronic obstructive pulmonary disease)  chronic hypoxic resp failure due to COPD requiring supp Oxygen 2-3L  Stented coronary artery  2017  HLD (hyperlipidemia)  Pulmonary HTN  Type 2 diabetes mellitus without complication, with long-term current use of insulin  DM (diabetes mellitus)  CAD (coronary artery disease)  GERD (gastroesophageal reflux disease)  Insomnia  CHF (congestive heart failure)  HTN (hypertension)  Mood disorder  No significant past surgical history  Allergies  No Known Allergies  Intolerances    MEDICATIONS  (STANDING):  albuterol/ipratropium for Nebulization 3 milliLiter(s) Nebulizer every 6 hours  aspirin enteric coated 81 milliGRAM(s) Oral daily  budesonide 160 MICROgram(s)/formoterol 4.5 MICROgram(s) Inhaler 2 Puff(s) Inhalation two times a day  cefTRIAXone   IVPB 1000 milliGRAM(s) IV Intermittent every 24 hours  dextrose 50% Injectable 25 Gram(s) IV Push once  finasteride 5 milliGRAM(s) Oral daily  gabapentin 100 milliGRAM(s) Oral three times a day  glucagon  Injectable 1 milliGRAM(s) IntraMuscular once  heparin   Injectable 5000 Unit(s) SubCutaneous every 8 hours  insulin glargine Injectable (LANTUS) 15 Unit(s) SubCutaneous at bedtime  insulin lispro (ADMELOG) corrective regimen sliding scale   SubCutaneous three times a day before meals  insulin lispro (ADMELOG) corrective regimen sliding scale   SubCutaneous at bedtime  insulin lispro Injectable (ADMELOG) 5 Unit(s) SubCutaneous three times a day before meals  latanoprost 0.005% Ophthalmic Solution 1 Drop(s) Both EYES at bedtime  lidocaine   4% Patch 1 Patch Transdermal daily  methylPREDNISolone sodium succinate Injectable 40 milliGRAM(s) IV Push every 6 hours  pantoprazole  Injectable 40 milliGRAM(s) IV Push daily  tamsulosin 0.4 milliGRAM(s) Oral at bedtime  traZODone 150 milliGRAM(s) Oral daily      MEDICATIONS  (PRN):   Medications up to date at time of exam.    Medications up to date at time of exam.    FAMILY HISTORY:  No family history of hypertension    No family history of mental disorder    FH: myocardial infarction (Mother)    No family history of mental disorder        SOCIAL HISTORY  Smoking History: [ x  ] smoking/smoke exposure, [   ] former smoker  Living Condition: [   ] apartment, [   ] private house  Work History:   Travel History: denies recent travel  Illicit Substance Use: denies  Alcohol Use: denies    REVIEW OF SYSTEMS: unable to obtain due to encephalopathy     CONSTITUTIONAL:  denies fevers, chills, sweats, weight loss    HEENT:  denies diplopia or blurred vision, sore throat or runny nose.    CARDIOVASCULAR:  denies pressure, squeezing, tightness, or heaviness about the chest; no palpitations.    RESPIRATORY:  denies SOB, cough, GIORDANO, wheezing.    GASTROINTESTINAL:  denies abdominal pain, nausea, vomiting or diarrhea.    GENITOURINARY: denies dysuria, frequency or urgency.    NEUROLOGIC:  denies numbness, tingling, seizures or weakness.    PSYCHIATRIC:  denies disorder of thought or mood.    MSK: denies swelling, redness      PHYSICAL EXAMINATION:    GENERAL: The patient is a well-developed, well-nourished, in no apparent distress.     Vital Signs Last 24 Hrs  T(C): 37.6 (26 Dec 2023 15:00), Max: 37.7 (26 Dec 2023 00:00)  T(F): 99.7 (26 Dec 2023 15:00), Max: 99.9 (26 Dec 2023 00:00)  HR: 82 (26 Dec 2023 15:00) (57 - 88)  BP: 100/58 (26 Dec 2023 15:00) (95/58 - 155/91)  BP(mean): 72 (26 Dec 2023 15:00) (70 - 114)  RR: 13 (26 Dec 2023 15:00) (13 - 31)  SpO2: 94% (26 Dec 2023 15:00) (90% - 100%)    Parameters below as of 26 Dec 2023 12:00  Patient On (Oxygen Delivery Method): nasal cannula  O2 Flow (L/min): 3    Mode: AC/ CMV (Assist Control/ Continuous Mandatory Ventilation)  RR (machine): 18  TV (machine): 400  FiO2: 40  PEEP: 5  ITime: 0.9  MAP: 11  PIP: 22   (if applicable)    Chest Tube (if applicable)    HEENT: Head is normocephalic and atraumatic. Extraocular muscles are intact. Mucous membranes are moist.     NECK: Supple, no palpable adenopathy.    LUNGS: Clear to auscultation, no wheezing, rales, or rhonchi.    HEART: Regular rate and rhythm without murmur.    ABDOMEN: Soft, nontender, and nondistended.  No hepatosplenomegaly is noted.    RENAL: No difficulty voiding, no pelvic pain    EXTREMITIES: Without any cyanosis, clubbing, rash, lesions or edema.    NEUROLOGIC: Awake, alert, oriented, grossly intact    SKIN: Warm, dry, good turgor.      LABS:                        11.8   9.58  )-----------( 192      ( 26 Dec 2023 03:15 )             38.8     12-26    134<L>  |  94<L>  |  25<H>  ----------------------------<  245<H>  4.8   |  40<H>  |  0.64    Ca    7.9<L>      26 Dec 2023 03:15  Phos  3.1     12-26  Mg     1.6     12-26    TPro  5.6<L>  /  Alb  2.8<L>  /  TBili  0.2  /  DBili  x   /  AST  10  /  ALT  26  /  AlkPhos  44  12-25    PT/INR - ( 24 Dec 2023 20:25 )   PT: 11.0 sec;   INR: 0.96 ratio         PTT - ( 24 Dec 2023 20:25 )  PTT:29.2 sec  Urinalysis Basic - ( 26 Dec 2023 03:15 )    Color: x / Appearance: x / SG: x / pH: x  Gluc: 245 mg/dL / Ketone: x  / Bili: x / Urobili: x   Blood: x / Protein: x / Nitrite: x   Leuk Esterase: x / RBC: x / WBC x   Sq Epi: x / Non Sq Epi: x / Bacteria: x      ABG - ( 25 Dec 2023 11:37 )  pH, Arterial: 7.45  pH, Blood: x     /  pCO2: 70    /  pO2: 80    / HCO3: 49    / Base Excess: 20.4  /  SaO2: 98        MICROBIOLOGY: (if applicable)    RADIOLOGY & ADDITIONAL STUDIES:  EKG:   CXR:< from: Xray Chest 1 View- PORTABLE-Urgent (Xray Chest 1 View- PORTABLE-Urgent .) (12.24.23 @ 22:27) >    ACC: [id number] EXAM:  XR CHEST PORTABLE URGENT 1V   ORDERED BY: GREG MONTOYA     PROCEDURE DATE:  12/24/2023          INTERPRETATION:  Chest radiograph (one view)     CPT [id number]    CLINICAL INFORMATION:  Assess ET tube placement.  No additional   information is provided.    TECHNIQUE:  Single frontal view of the chest was obtained.    FINDINGS:  Prior study of earlier the same day was available for review.    The lungs demonstrate patchy bilateral lower lobe airspace opacities.   Small right pleuraleffusion is noted. Endotracheal tube is noted in   satisfactory position above the angela.    The heart and mediastinum appear intact.  Fullness of both hilar regions   is noted.      IMPRESSION: Patchy bilateral lower lobe airspace opacities are noted.   Small right pleural effusion is seen. Endotracheal tube noted in   satisfactory position above the angela.    --- End of Report ---            MARYANNE OLIVARES MD; Attending Radiologist  This document has been electronically signed. Dec 25 17425:55AM    < end of copied text >    ECHO:    IMPRESSION: 63y Male PAST MEDICAL & SURGICAL HISTORY:  COPD (chronic obstructive pulmonary disease)  Oxygen 2-3L at home      Stented coronary artery  2017      HLD (hyperlipidemia)      Pulmonary HTN      Type 2 diabetes mellitus without complication, with long-term current use of insulin      DM (diabetes mellitus)      CAD (coronary artery disease)      GERD (gastroesophageal reflux disease)      Insomnia      CHF (congestive heart failure)      HTN (hypertension)      Mood disorder      No significant past surgical history       p/w     IMP:  This is a 63 yr old man with chronci hypoxic resp failure due to  COPD requiring supp O2  oxygen 3L / min  CAD, CHF presented to the ED with worsening SOB with respiratory distress.. In the ED he was found to be in copd exacerbation and in acute on chronic hypercapnic respiratory failure and he was intubated and placed on mechanical ventilation by the ED team. Soon after intubation he became hypotensive and was resuscitated with IV fluid boluses with NS to at least 4-5Liters.  At time of ICU evaluation, patient orally intubated  on mech vent and peripheral levophed on-going to maintain MAP >65. Patient is accepted to ICU for further management and closer monitoring.  Viral studies neg       ASSESSMENT   - Acute on chronic hypercapnic respiratory failure from COPD exacerbation s/p intubation.  - Acute exacerbation of COPD.  - Hypovolemia from dehydrated state,  - Right sided pneumonia  - Shock- hypovolemic, cardiogenic   - Hypovolemic hyponatremia, mild  - HTN CAD   - CHF   - Pulmonary HTN,      Sugg :     - Pat was extubated earlier today   - No sedation   - Monitor pulse oximetry and supp as needed to maintain sat >90%  - Decrease solumedrol 40 mg /day   - Albuterol/Atrovent nebulizer q6hrs  - Antibx as per ICU team   - Bipap at night and as needed, on home bipap support  - CT chest non contrast to eval b/l full emre   - DVT / GI prophy     discussed with ICU team and attend

## 2023-12-26 NOTE — PROGRESS NOTE ADULT - ASSESSMENT
Patient is a 62 yo male from [facility], active smoker, with a PMHx of CAD, CHF on nocturnal BIPAP, COPD ( MULTIPLE intubations in past on 3L of home O2), peripheral neuropathy, GERD, HTN, HLD, obesity, polyarthritis, Pulmonary HTN, TIA, Vit D Def, presents with hypoxemia from nursing facility and lethargy. Upon evaluation in ED, patient afebrile, BP of 126/58, tachycardic to 115bpm, hypoxic placed on 15L NRBM saturating 97%. ABG showing ph of 7.17, CO2 of 145. Patient noted to be lethargic and subsequently intubated. Physical examination noted for dry mucus membranes, mark in place immediately draining 2L clear urine. Lungs clear to auscultation. Labs significant for no leukocytosis, lactate 0.9, negative cardiac enzymes and UA negative for infection. CT head negative for any acute intracranial processes. CT chest showing patchy opacities in the right middle lobe and bilateral lower lobes with areas of consolidation suspicious for infectious etiology. EKG sinus tachycardia 115bpm with RBBB with no acute ischemic changes. Patient admitted to ICU for Acute on chronic hypoxic hypercapnic respiratory failure 2/2 Suspected COPD exacerbation.       # Acute encephalopathy   # Acute on chronic hypoxic hypercapnic respiratory failure 2/2 suspected COPD exacerbation   # Hx of COPD  # Hx of DM   # Chronic CHF   # Hx of CAD  # HTN  # HLD  # GERD   # Urinary retention     Neurological  # Acute encephalopathy  - patient noted to be lethargic upon arrival  - patient then subsequently intubated for airway protection  - noted to have C02 level of 145, CT head negative for any acute infarct, UA negative   - Acute encephalopathy can be 2/2 Acute on chronic CO2 retention   - continue with sedation with propofol   - continue ceftriaxone and Azithromycin due to CT chest findings, febrile   - f/u blood cultures   - post-intubation ABG improved, likely with CO2 chronically in the 80s    Pulmonary  # Acute on chronic hypoxic hypercapnic respiratory failure   - noted to have hx of COPD on Nocturnal bipap  - patient intubated in ED, due to lethargy   - can be 2/2 CO2 retention noted to be 145  - CT chest showing patchy opacities in right middle lobes and bilateral lobes   - continue with home nebulizers through vent   - continue with ceftriaxone and azithromycin  - continue with solumedrol   - post-intubation ABG improved, likely with CO2 chronically in the 80s    #Hx of COPD  - patient noted to be on symbicort  - c/w nebs via vent   - CT chest showing patchy opacities in right middle lobes and bilateral lobes   - post-intubation ABG improving  - continue with home nebulizers through vent   - continue with ceftriaxone and azithromycin  - continue with solumedrol     Cardiac  # HX of CAD   - patient noted to be on ASA at home   - can resume meds once NGT is in place  - troponin noted to be negative    #HTN   - patient noted to be on antihypertensives at home   - noted to be on propofol, patient is normotensive  - hold home anti hypertensives for now     #Chronic CHF   - patient noted to have hx of Chronic CHF  - last echo noted to show EF>70%  - will resume home medications    Renal  - no acute issues     Infectious disease  #Suspected PNA on CT chest    CT chest showing patchy opacities in right middle lobes and bilateral lobes   - post-intubation ABG improving   - continue with home nebulizers through vent   - continue with ceftriaxone and azithromycin  - continue with solumedrol   - f/u blood cultures   - f/u mycoplasma, legionella  - f/u sputum cx    Heme  - no acute issues    GI  - place NGT, start tube feeds   - protonix     Endo     #HX of DM  - noted to be on lantus 30 units QHS  - on SSI  - q6hrs finger sticks  - if persistently high can resume lantus and titrate towards home dose      #Urinary retention   - patient noted to produce 2L of urine after mark was placed  - UA negative for infection     Skin  - no acute issues    Lines  - peripheral IV lines    Disposition   ICU Patient is a 62 yo male from [facility], active smoker, with a PMHx of CAD, CHF on nocturnal BIPAP, COPD ( MULTIPLE intubations in past on 3L of home O2), peripheral neuropathy, GERD, HTN, HLD, obesity, polyarthritis, Pulmonary HTN, TIA, Vit D Def, presents with hypoxemia from nursing facility and lethargy. Upon evaluation in ED, patient afebrile, BP of 126/58, tachycardic to 115bpm, hypoxic placed on 15L NRBM saturating 97%. ABG showing ph of 7.17, CO2 of 145. Patient noted to be lethargic and subsequently intubated. Physical examination noted for dry mucus membranes, mark in place immediately draining 2L clear urine. Lungs clear to auscultation. Labs significant for no leukocytosis, lactate 0.9, negative cardiac enzymes and UA negative for infection. CT head negative for any acute intracranial processes. CT chest showing patchy opacities in the right middle lobe and bilateral lower lobes with areas of consolidation suspicious for infectious etiology. EKG sinus tachycardia 115bpm with RBBB with no acute ischemic changes. Patient admitted to ICU for Acute on chronic hypoxic hypercapnic respiratory failure 2/2 Suspected COPD exacerbation.       # Acute encephalopathy   # Acute on chronic hypoxic hypercapnic respiratory failure 2/2 suspected COPD exacerbation   # Hx of COPD  # Hx of DM   # Chronic CHF   # Hx of CAD  # HTN  # HLD  # GERD   # Urinary retention     Neurological  # Acute encephalopathy  - patient noted to be lethargic upon arrival  - patient then subsequently intubated for airway protection  - noted to have C02 level of 145, CT head negative for any acute infarct, UA negative   - Acute encephalopathy can be 2/2 Acute on chronic CO2 retention   - continue with sedation with propofol   - continue ceftriaxone and Azithromycin due to CT chest findings, febrile   - f/u blood cultures   - post-intubation ABG improved, likely with CO2 chronically in the 80s    Pulmonary  # Acute on chronic hypoxic hypercapnic respiratory failure   - noted to have hx of COPD on Nocturnal bipap  - patient intubated in ED, due to lethargy   - can be 2/2 CO2 retention noted to be 145  - CT chest showing patchy opacities in right middle lobes and bilateral lobes   - continue with home nebulizers through vent   - continue with ceftriaxone and azithromycin  - continue with solumedrol   - post-intubation ABG improved, likely with CO2 chronically in the 80s    #Hx of COPD  - patient noted to be on symbicort  - c/w nebs via vent   - CT chest showing patchy opacities in right middle lobes and bilateral lobes   - post-intubation ABG improving  - continue with home nebulizers through vent   - continue with ceftriaxone and azithromycin  - continue with solumedrol     Cardiac  # HX of CAD   - patient noted to be on ASA at home   - can resume meds once NGT is in place  - troponin noted to be negative    #HTN   - patient noted to be on antihypertensives at home   - noted to be on propofol, patient is normotensive  - hold home anti hypertensives for now     #Chronic CHF   - patient noted to have hx of Chronic CHF  - last echo noted to show EF>70%  - will resume home medications    Renal  - no acute issues     Infectious disease  #Suspected PNA on CT chest    CT chest showing patchy opacities in right middle lobes and bilateral lobes   - post-intubation ABG improving   - continue with home nebulizers through vent   - continue with ceftriaxone and azithromycin  - continue with solumedrol   - f/u blood cultures   - f/u mycoplasma, legionella  - f/u sputum cx    Heme  - no acute issues    GI  - place NGT, start tube feeds   - protonix     Endo     #HX of DM  - noted to be on lantus 30 units QHS  - on SSI  - q6hrs finger sticks  - if persistently high can resume lantus and titrate towards home dose      #Urinary retention   - patient noted to produce 2L of urine after mark was placed  - UA negative for infection     Skin  - no acute issues    Lines  - peripheral IV lines    Disposition   ICU Patient is a 64 yo male from [facility], active smoker, with a PMHx of CAD, CHF on nocturnal BIPAP, COPD ( MULTIPLE intubations in past on 3L of home O2), peripheral neuropathy, GERD, HTN, HLD, obesity, polyarthritis, Pulmonary HTN, TIA, Vit D Def, presents with hypoxemia from nursing facility and lethargy. Upon evaluation in ED, patient afebrile, BP of 126/58, tachycardic to 115bpm, hypoxic placed on 15L NRBM saturating 97%. ABG showing ph of 7.17, CO2 of 145. Patient noted to be lethargic and subsequently intubated. Physical examination noted for dry mucus membranes, mark in place immediately draining 2L clear urine. Lungs clear to auscultation. Labs significant for no leukocytosis, lactate 0.9, negative cardiac enzymes and UA negative for infection. CT head negative for any acute intracranial processes. CT chest showing patchy opacities in the right middle lobe and bilateral lower lobes with areas of consolidation suspicious for infectious etiology. EKG sinus tachycardia 115bpm with RBBB with no acute ischemic changes. Patient admitted to ICU for Acute on chronic hypoxic hypercapnic respiratory failure 2/2 Suspected COPD exacerbation.       # Acute encephalopathy   # Acute on chronic hypoxic hypercapnic respiratory failure 2/2 suspected COPD exacerbation   # Hx of COPD  # Hx of DM   # Chronic CHF   # Hx of CAD  # HTN  # HLD  # GERD   # Urinary retention     Neurological  # Acute encephalopathy  - patient noted to be lethargic upon arrival  - patient then subsequently intubated for airway protection  - noted to have C02 level of 145, CT head negative for any acute infarct, UA negative   - Acute encephalopathy can be 2/2 Acute on chronic CO2 retention   - continue with sedation with propofol   - dc azithro, legionella neg   - bcx NGTG @24h  - post-intubation ABG improved, likely with CO2 chronically in the 80s  - mental status improved 12/26    Pulmonary  # Acute on chronic hypoxic hypercapnic respiratory failure   - noted to have hx of COPD on Nocturnal bipap  - patient intubated in ED, due to lethargy   - can be 2/2 CO2 retention noted to be 145  - CT chest showing patchy opacities in right middle lobes and bilateral lobes   - continue with home nebulizers   - continue with ceftriaxone   - dc azithromycin   - continue with solumedrol   - extubated 12/26    #Hx of COPD  - patient noted to be on symbicort  - c/w nebs  - CT chest showing patchy opacities in right middle lobes and bilateral lobes   - post-intubation ABG improving  - continue with home nebulizers  - continue with ceftriaxone  - dc axithro  - continue with solumedrol   - extubated 12/26    Cardiac  # HX of CAD   - patient noted to be on ASA at home   - resume home meds now that extubated   - troponin noted to be negative    #HTN   - patient noted to be on antihypertensives at home   - noted to be on propofol, patient is normotensive  - hold home anti hypertensives for now     #Chronic CHF   - patient noted to have hx of Chronic CHF  - last echo noted to show EF>70%  - will resume home medications    Renal  - no acute issues     Infectious disease  #Suspected PNA on CT chest    CT chest showing patchy opacities in right middle lobes and bilateral lobes   - post-intubation ABG improving   - continue with home nebulizers through vent   - continue with ceftriaxone and azithromycin  - continue with solumedrol   - f/u blood cultures   - f/u mycoplasma, legionella  - f/u sputum cx    Heme  - no acute issues    GI  - place NGT, start tube feeds   - protonix     Endo     #HX of DM  - noted to be on lantus 30 units QHS  - on SSI  - q6hrs finger sticks  - if persistently high can resume lantus and titrate towards home dose      #Urinary retention   - patient noted to produce 2L of urine after mark was placed  - UA negative for infection     Skin  - no acute issues    Lines  - peripheral IV lines    Disposition   ICU Patient is a 62 yo male from [facility], active smoker, with a PMHx of CAD, CHF on nocturnal BIPAP, COPD ( MULTIPLE intubations in past on 3L of home O2), peripheral neuropathy, GERD, HTN, HLD, obesity, polyarthritis, Pulmonary HTN, TIA, Vit D Def, presents with hypoxemia from nursing facility and lethargy. Upon evaluation in ED, patient afebrile, BP of 126/58, tachycardic to 115bpm, hypoxic placed on 15L NRBM saturating 97%. ABG showing ph of 7.17, CO2 of 145. Patient noted to be lethargic and subsequently intubated. Physical examination noted for dry mucus membranes, mark in place immediately draining 2L clear urine. Lungs clear to auscultation. Labs significant for no leukocytosis, lactate 0.9, negative cardiac enzymes and UA negative for infection. CT head negative for any acute intracranial processes. CT chest showing patchy opacities in the right middle lobe and bilateral lower lobes with areas of consolidation suspicious for infectious etiology. EKG sinus tachycardia 115bpm with RBBB with no acute ischemic changes. Patient admitted to ICU for Acute on chronic hypoxic hypercapnic respiratory failure 2/2 Suspected COPD exacerbation.       # Acute encephalopathy   # Acute on chronic hypoxic hypercapnic respiratory failure 2/2 suspected COPD exacerbation   # Hx of COPD  # Hx of DM   # Chronic CHF   # Hx of CAD  # HTN  # HLD  # GERD   # Urinary retention     Neurological  # Acute encephalopathy  - patient noted to be lethargic upon arrival  - patient then subsequently intubated for airway protection  - noted to have C02 level of 145, CT head negative for any acute infarct, UA negative   - Acute encephalopathy can be 2/2 Acute on chronic CO2 retention   - continue with sedation with propofol   - dc azithro, legionella neg   - bcx NGTG @24h  - post-intubation ABG improved, likely with CO2 chronically in the 80s  - mental status improved 12/26    Pulmonary  # Acute on chronic hypoxic hypercapnic respiratory failure   - noted to have hx of COPD on Nocturnal bipap  - patient intubated in ED, due to lethargy   - can be 2/2 CO2 retention noted to be 145  - CT chest showing patchy opacities in right middle lobes and bilateral lobes   - continue with home nebulizers   - continue with ceftriaxone   - dc azithromycin   - continue with solumedrol   - extubated 12/26    #Hx of COPD  - patient noted to be on symbicort  - c/w nebs  - CT chest showing patchy opacities in right middle lobes and bilateral lobes   - post-intubation ABG improving  - continue with home nebulizers  - continue with ceftriaxone  - dc axithro  - continue with solumedrol   - extubated 12/26    Cardiac  # HX of CAD   - patient noted to be on ASA at home   - resume home meds now that extubated   - troponin noted to be negative    #HTN   - patient noted to be on antihypertensives at home   - noted to be on propofol, patient is normotensive  - hold home anti hypertensives for now     #Chronic CHF   - patient noted to have hx of Chronic CHF  - last echo noted to show EF>70%  - will resume home medications    Renal  - no acute issues     Infectious disease  #Suspected PNA on CT chest    CT chest showing patchy opacities in right middle lobes and bilateral lobes   - post-intubation ABG improving   - continue with home nebulizers through vent   - continue with ceftriaxone and azithromycin  - continue with solumedrol   - f/u blood cultures   - f/u mycoplasma, legionella  - f/u sputum cx    Heme  - no acute issues    GI  - place NGT, start tube feeds   - protonix     Endo     #HX of DM  - noted to be on lantus 30 units QHS  - on SSI  - q6hrs finger sticks  - if persistently high can resume lantus and titrate towards home dose      #Urinary retention   - patient noted to produce 2L of urine after mark was placed  - UA negative for infection     Skin  - no acute issues    Lines  - peripheral IV lines    Disposition   ICU Patient is a 64 yo male from [facility], active smoker, with a PMHx of CAD, CHF on nocturnal BIPAP, COPD ( MULTIPLE intubations in past on 3L of home O2), peripheral neuropathy, GERD, HTN, HLD, obesity, polyarthritis, Pulmonary HTN, TIA, Vit D Def, presents with hypoxemia from nursing facility and lethargy. Upon evaluation in ED, patient afebrile, BP of 126/58, tachycardic to 115bpm, hypoxic placed on 15L NRBM saturating 97%. ABG showing ph of 7.17, CO2 of 145. Patient noted to be lethargic and subsequently intubated. Physical examination noted for dry mucus membranes, mark in place immediately draining 2L clear urine. Lungs clear to auscultation. Labs significant for no leukocytosis, lactate 0.9, negative cardiac enzymes and UA negative for infection. CT head negative for any acute intracranial processes. CT chest showing patchy opacities in the right middle lobe and bilateral lower lobes with areas of consolidation suspicious for infectious etiology. EKG sinus tachycardia 115bpm with RBBB with no acute ischemic changes. Patient admitted to ICU for Acute on chronic hypoxic hypercapnic respiratory failure 2/2 Suspected COPD exacerbation.       # Acute encephalopathy   # Acute on chronic hypoxic hypercapnic respiratory failure 2/2 suspected COPD exacerbation   # Hx of COPD  # Hx of DM   # Chronic CHF   # Hx of CAD  # HTN  # HLD  # GERD   # Urinary retention     Neurological  # Acute encephalopathy  - patient noted to be lethargic upon arrival, intubated for airway protection  - noted to have C02 level of 145, CT head negative for any acute infarct, UA negative   - Acute encephalopathy likely 2/2 Acute on chronic CO2 retention   - continue with sedation with propofol   - bcx NGTG @24h  - post-intubation ABG improved, likely with CO2 chronically in the 80s  - mental status improved 12/26, extubated    Pulmonary  # Acute on chronic hypoxic hypercapnic respiratory failure   - noted to have hx of COPD on Nocturnal bipap 12/6  - patient intubated in ED, due to lethargy   - likely 2/2 CO2 retention  - CT chest showing patchy opacities in right middle lobes and bilateral lobes   - continue with home nebulizers   - continue with ceftriaxone   - dc azithromycin   - continue with solumedrol   - extubated 12/26    #Hx of COPD  - patient noted to be on symbicort  - c/w nebs  - CT chest showing patchy opacities in right middle lobes and bilateral lobes   - post-intubation ABG improving  - continue with home nebulizers  - continue with ceftriaxone  - dc axithro  - continue with solumedrol   - extubated 12/26    Cardiac  # HX of CAD   - patient noted to be on ASA at home   - resume home meds now that extubated   - troponin noted to be negative    #HTN   - patient noted to be on antihypertensives at home   - noted to be on propofol, patient is normotensive  - hold home anti hypertensives for now     #Chronic CHF   - patient noted to have hx of Chronic CHF  - last echo noted to show EF>70%  - will resume home medications    Renal  - no acute issues     Infectious disease  #Suspected PNA on CT chest    CT chest showing patchy opacities in right middle lobes and bilateral lobes   - continue with home nebulizers   - continue with ceftriaxone   - legionella neg, dc azithro  - continue with solumedrol   - bcx and sputum cx NGTD 24h  - f/u mycoplasma, strep    Heme  - no acute issues    GI  - resume diet  - protonix     Endo     #HX of DM  - noted to be on lantus 30 units QHS  - on SSI  - q6hrs finger sticks  - add lantus 15      #Urinary retention   - patient noted to produce 2L of urine after mark was placed  - UA negative for infection     Skin  - no acute issues    Lines  - peripheral IV lines    Disposition   ICU Patient is a 62 yo male from [facility], active smoker, with a PMHx of CAD, CHF on nocturnal BIPAP, COPD ( MULTIPLE intubations in past on 3L of home O2), peripheral neuropathy, GERD, HTN, HLD, obesity, polyarthritis, Pulmonary HTN, TIA, Vit D Def, presents with hypoxemia from nursing facility and lethargy. Upon evaluation in ED, patient afebrile, BP of 126/58, tachycardic to 115bpm, hypoxic placed on 15L NRBM saturating 97%. ABG showing ph of 7.17, CO2 of 145. Patient noted to be lethargic and subsequently intubated. Physical examination noted for dry mucus membranes, mark in place immediately draining 2L clear urine. Lungs clear to auscultation. Labs significant for no leukocytosis, lactate 0.9, negative cardiac enzymes and UA negative for infection. CT head negative for any acute intracranial processes. CT chest showing patchy opacities in the right middle lobe and bilateral lower lobes with areas of consolidation suspicious for infectious etiology. EKG sinus tachycardia 115bpm with RBBB with no acute ischemic changes. Patient admitted to ICU for Acute on chronic hypoxic hypercapnic respiratory failure 2/2 Suspected COPD exacerbation.       # Acute encephalopathy   # Acute on chronic hypoxic hypercapnic respiratory failure 2/2 suspected COPD exacerbation   # Hx of COPD  # Hx of DM   # Chronic CHF   # Hx of CAD  # HTN  # HLD  # GERD   # Urinary retention     Neurological  # Acute encephalopathy  - patient noted to be lethargic upon arrival, intubated for airway protection  - noted to have C02 level of 145, CT head negative for any acute infarct, UA negative   - Acute encephalopathy likely 2/2 Acute on chronic CO2 retention   - continue with sedation with propofol   - bcx NGTG @24h  - post-intubation ABG improved, likely with CO2 chronically in the 80s  - mental status improved 12/26, extubated    Pulmonary  # Acute on chronic hypoxic hypercapnic respiratory failure   - noted to have hx of COPD on Nocturnal bipap 12/6  - patient intubated in ED, due to lethargy   - likely 2/2 CO2 retention  - CT chest showing patchy opacities in right middle lobes and bilateral lobes   - continue with home nebulizers   - continue with ceftriaxone   - dc azithromycin   - continue with solumedrol   - extubated 12/26    #Hx of COPD  - patient noted to be on symbicort  - c/w nebs  - CT chest showing patchy opacities in right middle lobes and bilateral lobes   - post-intubation ABG improving  - continue with home nebulizers  - continue with ceftriaxone  - dc axithro  - continue with solumedrol   - extubated 12/26    Cardiac  # HX of CAD   - patient noted to be on ASA at home   - resume home meds now that extubated   - troponin noted to be negative    #HTN   - patient noted to be on antihypertensives at home   - noted to be on propofol, patient is normotensive  - hold home anti hypertensives for now     #Chronic CHF   - patient noted to have hx of Chronic CHF  - last echo noted to show EF>70%  - will resume home medications    Renal  - no acute issues     Infectious disease  #Suspected PNA on CT chest    CT chest showing patchy opacities in right middle lobes and bilateral lobes   - continue with home nebulizers   - continue with ceftriaxone   - legionella neg, dc azithro  - continue with solumedrol   - bcx and sputum cx NGTD 24h  - f/u mycoplasma, strep    Heme  - no acute issues    GI  - resume diet  - protonix     Endo     #HX of DM  - noted to be on lantus 30 units QHS  - on SSI  - q6hrs finger sticks  - add lantus 15      #Urinary retention   - patient noted to produce 2L of urine after mark was placed  - UA negative for infection     Skin  - no acute issues    Lines  - peripheral IV lines    Disposition   ICU

## 2023-12-26 NOTE — PHYSICAL THERAPY INITIAL EVALUATION ADULT - PERTINENT HX OF CURRENT PROBLEM, REHAB EVAL
Acute on chronic hypoxic hypercapnic respiratory failure 2/2 Suspected COPD exacerbation. intubation w/extubation and cleared by MD for eval. Additional past medical history as detailed below by medical team.

## 2023-12-27 LAB
A1C WITH ESTIMATED AVERAGE GLUCOSE RESULT: 9.2 % — HIGH (ref 4–5.6)
A1C WITH ESTIMATED AVERAGE GLUCOSE RESULT: 9.2 % — HIGH (ref 4–5.6)
ANION GAP SERPL CALC-SCNC: 4 MMOL/L — LOW (ref 5–17)
ANION GAP SERPL CALC-SCNC: 4 MMOL/L — LOW (ref 5–17)
BASE EXCESS BLDA CALC-SCNC: 12.1 MMOL/L — HIGH (ref -2–3)
BASE EXCESS BLDA CALC-SCNC: 12.1 MMOL/L — HIGH (ref -2–3)
BASOPHILS # BLD AUTO: 0.02 K/UL — SIGNIFICANT CHANGE UP (ref 0–0.2)
BASOPHILS # BLD AUTO: 0.02 K/UL — SIGNIFICANT CHANGE UP (ref 0–0.2)
BASOPHILS NFR BLD AUTO: 0.2 % — SIGNIFICANT CHANGE UP (ref 0–2)
BASOPHILS NFR BLD AUTO: 0.2 % — SIGNIFICANT CHANGE UP (ref 0–2)
BLOOD GAS COMMENTS ARTERIAL: SIGNIFICANT CHANGE UP
BLOOD GAS COMMENTS ARTERIAL: SIGNIFICANT CHANGE UP
BUN SERPL-MCNC: 21 MG/DL — HIGH (ref 7–18)
BUN SERPL-MCNC: 21 MG/DL — HIGH (ref 7–18)
CALCIUM SERPL-MCNC: 8.2 MG/DL — LOW (ref 8.4–10.5)
CALCIUM SERPL-MCNC: 8.2 MG/DL — LOW (ref 8.4–10.5)
CHLORIDE SERPL-SCNC: 92 MMOL/L — LOW (ref 96–108)
CHLORIDE SERPL-SCNC: 92 MMOL/L — LOW (ref 96–108)
CO2 SERPL-SCNC: 35 MMOL/L — HIGH (ref 22–31)
CO2 SERPL-SCNC: 35 MMOL/L — HIGH (ref 22–31)
CREAT SERPL-MCNC: 0.61 MG/DL — SIGNIFICANT CHANGE UP (ref 0.5–1.3)
CREAT SERPL-MCNC: 0.61 MG/DL — SIGNIFICANT CHANGE UP (ref 0.5–1.3)
EGFR: 109 ML/MIN/1.73M2 — SIGNIFICANT CHANGE UP
EGFR: 109 ML/MIN/1.73M2 — SIGNIFICANT CHANGE UP
EOSINOPHIL # BLD AUTO: 0.03 K/UL — SIGNIFICANT CHANGE UP (ref 0–0.5)
EOSINOPHIL # BLD AUTO: 0.03 K/UL — SIGNIFICANT CHANGE UP (ref 0–0.5)
EOSINOPHIL NFR BLD AUTO: 0.3 % — SIGNIFICANT CHANGE UP (ref 0–6)
EOSINOPHIL NFR BLD AUTO: 0.3 % — SIGNIFICANT CHANGE UP (ref 0–6)
ESTIMATED AVERAGE GLUCOSE: 217 MG/DL — HIGH (ref 68–114)
ESTIMATED AVERAGE GLUCOSE: 217 MG/DL — HIGH (ref 68–114)
GLUCOSE SERPL-MCNC: 340 MG/DL — HIGH (ref 70–99)
GLUCOSE SERPL-MCNC: 340 MG/DL — HIGH (ref 70–99)
HCO3 BLDA-SCNC: 37 MMOL/L — HIGH (ref 21–28)
HCO3 BLDA-SCNC: 37 MMOL/L — HIGH (ref 21–28)
HCT VFR BLD CALC: 35.4 % — LOW (ref 39–50)
HCT VFR BLD CALC: 35.4 % — LOW (ref 39–50)
HGB BLD-MCNC: 11.6 G/DL — LOW (ref 13–17)
HGB BLD-MCNC: 11.6 G/DL — LOW (ref 13–17)
HOROWITZ INDEX BLDA+IHG-RTO: 32 — SIGNIFICANT CHANGE UP
HOROWITZ INDEX BLDA+IHG-RTO: 32 — SIGNIFICANT CHANGE UP
IMM GRANULOCYTES NFR BLD AUTO: 1.1 % — HIGH (ref 0–0.9)
IMM GRANULOCYTES NFR BLD AUTO: 1.1 % — HIGH (ref 0–0.9)
LYMPHOCYTES # BLD AUTO: 0.83 K/UL — LOW (ref 1–3.3)
LYMPHOCYTES # BLD AUTO: 0.83 K/UL — LOW (ref 1–3.3)
LYMPHOCYTES # BLD AUTO: 7 % — LOW (ref 13–44)
LYMPHOCYTES # BLD AUTO: 7 % — LOW (ref 13–44)
M PNEUMO IGG SER IA-ACNC: 2.97 INDEX — HIGH (ref 0–0.9)
M PNEUMO IGG SER IA-ACNC: 2.97 INDEX — HIGH (ref 0–0.9)
M PNEUMO IGG SER IA-ACNC: POSITIVE
M PNEUMO IGG SER IA-ACNC: POSITIVE
M PNEUMO IGM SER-ACNC: 0.25 INDEX — SIGNIFICANT CHANGE UP (ref 0–0.9)
M PNEUMO IGM SER-ACNC: 0.25 INDEX — SIGNIFICANT CHANGE UP (ref 0–0.9)
MAGNESIUM SERPL-MCNC: 1.7 MG/DL — SIGNIFICANT CHANGE UP (ref 1.6–2.6)
MAGNESIUM SERPL-MCNC: 1.7 MG/DL — SIGNIFICANT CHANGE UP (ref 1.6–2.6)
MCHC RBC-ENTMCNC: 27.8 PG — SIGNIFICANT CHANGE UP (ref 27–34)
MCHC RBC-ENTMCNC: 27.8 PG — SIGNIFICANT CHANGE UP (ref 27–34)
MCHC RBC-ENTMCNC: 32.8 GM/DL — SIGNIFICANT CHANGE UP (ref 32–36)
MCHC RBC-ENTMCNC: 32.8 GM/DL — SIGNIFICANT CHANGE UP (ref 32–36)
MCV RBC AUTO: 84.7 FL — SIGNIFICANT CHANGE UP (ref 80–100)
MCV RBC AUTO: 84.7 FL — SIGNIFICANT CHANGE UP (ref 80–100)
MONOCYTES # BLD AUTO: 0.62 K/UL — SIGNIFICANT CHANGE UP (ref 0–0.9)
MONOCYTES # BLD AUTO: 0.62 K/UL — SIGNIFICANT CHANGE UP (ref 0–0.9)
MONOCYTES NFR BLD AUTO: 5.2 % — SIGNIFICANT CHANGE UP (ref 2–14)
MONOCYTES NFR BLD AUTO: 5.2 % — SIGNIFICANT CHANGE UP (ref 2–14)
MYCOPLASMA AG SPEC QL: NEGATIVE — SIGNIFICANT CHANGE UP
MYCOPLASMA AG SPEC QL: NEGATIVE — SIGNIFICANT CHANGE UP
NEUTROPHILS # BLD AUTO: 10.19 K/UL — HIGH (ref 1.8–7.4)
NEUTROPHILS # BLD AUTO: 10.19 K/UL — HIGH (ref 1.8–7.4)
NEUTROPHILS NFR BLD AUTO: 86.2 % — HIGH (ref 43–77)
NEUTROPHILS NFR BLD AUTO: 86.2 % — HIGH (ref 43–77)
NRBC # BLD: 0 /100 WBCS — SIGNIFICANT CHANGE UP (ref 0–0)
NRBC # BLD: 0 /100 WBCS — SIGNIFICANT CHANGE UP (ref 0–0)
PCO2 BLDA: 49 MMHG — HIGH (ref 35–48)
PCO2 BLDA: 49 MMHG — HIGH (ref 35–48)
PH BLDA: 7.49 — HIGH (ref 7.35–7.45)
PH BLDA: 7.49 — HIGH (ref 7.35–7.45)
PHOSPHATE SERPL-MCNC: 3.1 MG/DL — SIGNIFICANT CHANGE UP (ref 2.5–4.5)
PHOSPHATE SERPL-MCNC: 3.1 MG/DL — SIGNIFICANT CHANGE UP (ref 2.5–4.5)
PLATELET # BLD AUTO: 184 K/UL — SIGNIFICANT CHANGE UP (ref 150–400)
PLATELET # BLD AUTO: 184 K/UL — SIGNIFICANT CHANGE UP (ref 150–400)
PO2 BLDA: 78 MMHG — LOW (ref 83–108)
PO2 BLDA: 78 MMHG — LOW (ref 83–108)
POTASSIUM SERPL-MCNC: 3.9 MMOL/L — SIGNIFICANT CHANGE UP (ref 3.5–5.3)
POTASSIUM SERPL-MCNC: 3.9 MMOL/L — SIGNIFICANT CHANGE UP (ref 3.5–5.3)
POTASSIUM SERPL-SCNC: 3.9 MMOL/L — SIGNIFICANT CHANGE UP (ref 3.5–5.3)
POTASSIUM SERPL-SCNC: 3.9 MMOL/L — SIGNIFICANT CHANGE UP (ref 3.5–5.3)
RBC # BLD: 4.18 M/UL — LOW (ref 4.2–5.8)
RBC # BLD: 4.18 M/UL — LOW (ref 4.2–5.8)
RBC # FLD: 13.2 % — SIGNIFICANT CHANGE UP (ref 10.3–14.5)
RBC # FLD: 13.2 % — SIGNIFICANT CHANGE UP (ref 10.3–14.5)
SAO2 % BLDA: 97 % — SIGNIFICANT CHANGE UP
SAO2 % BLDA: 97 % — SIGNIFICANT CHANGE UP
SODIUM SERPL-SCNC: 131 MMOL/L — LOW (ref 135–145)
SODIUM SERPL-SCNC: 131 MMOL/L — LOW (ref 135–145)
WBC # BLD: 11.82 K/UL — HIGH (ref 3.8–10.5)
WBC # BLD: 11.82 K/UL — HIGH (ref 3.8–10.5)
WBC # FLD AUTO: 11.82 K/UL — HIGH (ref 3.8–10.5)
WBC # FLD AUTO: 11.82 K/UL — HIGH (ref 3.8–10.5)

## 2023-12-27 RX ORDER — INSULIN LISPRO 100/ML
VIAL (ML) SUBCUTANEOUS AT BEDTIME
Refills: 0 | Status: DISCONTINUED | OUTPATIENT
Start: 2023-12-27 | End: 2024-01-02

## 2023-12-27 RX ORDER — INSULIN LISPRO 100/ML
VIAL (ML) SUBCUTANEOUS
Refills: 0 | Status: DISCONTINUED | OUTPATIENT
Start: 2023-12-27 | End: 2024-01-02

## 2023-12-27 RX ORDER — INSULIN LISPRO 100/ML
7 VIAL (ML) SUBCUTANEOUS
Refills: 0 | Status: DISCONTINUED | OUTPATIENT
Start: 2023-12-27 | End: 2023-12-29

## 2023-12-27 RX ORDER — ACETAMINOPHEN 500 MG
1000 TABLET ORAL ONCE
Refills: 0 | Status: COMPLETED | OUTPATIENT
Start: 2023-12-27 | End: 2023-12-27

## 2023-12-27 RX ORDER — INSULIN GLARGINE 100 [IU]/ML
20 INJECTION, SOLUTION SUBCUTANEOUS AT BEDTIME
Refills: 0 | Status: DISCONTINUED | OUTPATIENT
Start: 2023-12-27 | End: 2023-12-30

## 2023-12-27 RX ADMIN — Medication 3 MILLILITER(S): at 02:55

## 2023-12-27 RX ADMIN — Medication 8: at 08:25

## 2023-12-27 RX ADMIN — Medication 1000 MILLIGRAM(S): at 00:10

## 2023-12-27 RX ADMIN — LIDOCAINE 1 PATCH: 4 CREAM TOPICAL at 13:45

## 2023-12-27 RX ADMIN — Medication 400 MILLIGRAM(S): at 23:38

## 2023-12-27 RX ADMIN — Medication 150 MILLIGRAM(S): at 11:16

## 2023-12-27 RX ADMIN — CEFTRIAXONE 100 MILLIGRAM(S): 500 INJECTION, POWDER, FOR SOLUTION INTRAMUSCULAR; INTRAVENOUS at 13:23

## 2023-12-27 RX ADMIN — GABAPENTIN 100 MILLIGRAM(S): 400 CAPSULE ORAL at 06:05

## 2023-12-27 RX ADMIN — Medication 5 UNIT(S): at 11:17

## 2023-12-27 RX ADMIN — Medication 3 MILLILITER(S): at 08:56

## 2023-12-27 RX ADMIN — TAMSULOSIN HYDROCHLORIDE 0.4 MILLIGRAM(S): 0.4 CAPSULE ORAL at 22:47

## 2023-12-27 RX ADMIN — HEPARIN SODIUM 5000 UNIT(S): 5000 INJECTION INTRAVENOUS; SUBCUTANEOUS at 06:05

## 2023-12-27 RX ADMIN — Medication 4: at 22:47

## 2023-12-27 RX ADMIN — FINASTERIDE 5 MILLIGRAM(S): 5 TABLET, FILM COATED ORAL at 13:23

## 2023-12-27 RX ADMIN — HEPARIN SODIUM 5000 UNIT(S): 5000 INJECTION INTRAVENOUS; SUBCUTANEOUS at 13:24

## 2023-12-27 RX ADMIN — Medication 40 MILLIGRAM(S): at 11:17

## 2023-12-27 RX ADMIN — INSULIN GLARGINE 20 UNIT(S): 100 INJECTION, SOLUTION SUBCUTANEOUS at 22:48

## 2023-12-27 RX ADMIN — Medication 10: at 11:18

## 2023-12-27 RX ADMIN — HEPARIN SODIUM 5000 UNIT(S): 5000 INJECTION INTRAVENOUS; SUBCUTANEOUS at 22:52

## 2023-12-27 RX ADMIN — Medication 7 UNIT(S): at 16:47

## 2023-12-27 RX ADMIN — LIDOCAINE 1 PATCH: 4 CREAM TOPICAL at 21:36

## 2023-12-27 RX ADMIN — PANTOPRAZOLE SODIUM 40 MILLIGRAM(S): 20 TABLET, DELAYED RELEASE ORAL at 11:15

## 2023-12-27 RX ADMIN — GABAPENTIN 100 MILLIGRAM(S): 400 CAPSULE ORAL at 22:47

## 2023-12-27 RX ADMIN — GABAPENTIN 100 MILLIGRAM(S): 400 CAPSULE ORAL at 13:23

## 2023-12-27 RX ADMIN — Medication 3 MILLILITER(S): at 20:02

## 2023-12-27 RX ADMIN — Medication 8: at 16:48

## 2023-12-27 RX ADMIN — Medication 5 UNIT(S): at 08:10

## 2023-12-27 RX ADMIN — Medication 81 MILLIGRAM(S): at 13:23

## 2023-12-27 RX ADMIN — Medication 40 MILLIGRAM(S): at 06:06

## 2023-12-27 RX ADMIN — Medication 3 MILLILITER(S): at 15:58

## 2023-12-27 NOTE — DIETITIAN INITIAL EVALUATION ADULT - [id number]_GEN_A_CORE
Ht=6' 1" per EMR history   WLC=750 lb   Wt data in EMR: 190 lb 10/11/23; 195 lb 12/24/23; 195.5 lb 12/27/23    Ht=6' 1" per EMR history   QBK=308 lb   Wt data in EMR: 190 lb 10/11/23; 195 lb 12/24/23; 195.5 lb 12/27/23

## 2023-12-27 NOTE — DIETITIAN INITIAL EVALUATION ADULT - PERTINENT MEDS FT
MEDICATIONS  (STANDING):  albuterol/ipratropium for Nebulization 3 milliLiter(s) Nebulizer every 6 hours  aspirin enteric coated 81 milliGRAM(s) Oral daily  budesonide 160 MICROgram(s)/formoterol 4.5 MICROgram(s) Inhaler 2 Puff(s) Inhalation two times a day  cefTRIAXone   IVPB 1000 milliGRAM(s) IV Intermittent every 24 hours  dextrose 50% Injectable 25 Gram(s) IV Push once  finasteride 5 milliGRAM(s) Oral daily  gabapentin 100 milliGRAM(s) Oral three times a day  glucagon  Injectable 1 milliGRAM(s) IntraMuscular once  heparin   Injectable 5000 Unit(s) SubCutaneous every 8 hours  insulin glargine Injectable (LANTUS) 15 Unit(s) SubCutaneous at bedtime  insulin lispro (ADMELOG) corrective regimen sliding scale   SubCutaneous three times a day before meals  insulin lispro (ADMELOG) corrective regimen sliding scale   SubCutaneous at bedtime  insulin lispro Injectable (ADMELOG) 5 Unit(s) SubCutaneous three times a day before meals  latanoprost 0.005% Ophthalmic Solution 1 Drop(s) Both EYES at bedtime  lidocaine   4% Patch 1 Patch Transdermal daily  methylPREDNISolone sodium succinate Injectable 40 milliGRAM(s) IV Push every 6 hours  pantoprazole  Injectable 40 milliGRAM(s) IV Push daily  tamsulosin 0.4 milliGRAM(s) Oral at bedtime  traZODone 150 milliGRAM(s) Oral daily    MEDICATIONS  (PRN):

## 2023-12-27 NOTE — PROGRESS NOTE ADULT - SUBJECTIVE AND OBJECTIVE BOX
Time of Visit:  Patient seen and examined.     MEDICATIONS  (STANDING):  albuterol/ipratropium for Nebulization 3 milliLiter(s) Nebulizer every 6 hours  aspirin enteric coated 81 milliGRAM(s) Oral daily  budesonide 160 MICROgram(s)/formoterol 4.5 MICROgram(s) Inhaler 2 Puff(s) Inhalation two times a day  cefTRIAXone   IVPB 1000 milliGRAM(s) IV Intermittent every 24 hours  finasteride 5 milliGRAM(s) Oral daily  gabapentin 100 milliGRAM(s) Oral three times a day  glucagon  Injectable 1 milliGRAM(s) IntraMuscular once  heparin   Injectable 5000 Unit(s) SubCutaneous every 8 hours  insulin glargine Injectable (LANTUS) 20 Unit(s) SubCutaneous at bedtime  insulin lispro (ADMELOG) corrective regimen sliding scale   SubCutaneous three times a day before meals  insulin lispro (ADMELOG) corrective regimen sliding scale   SubCutaneous at bedtime  insulin lispro Injectable (ADMELOG) 7 Unit(s) SubCutaneous three times a day before meals  latanoprost 0.005% Ophthalmic Solution 1 Drop(s) Both EYES at bedtime  lidocaine   4% Patch 1 Patch Transdermal daily  pantoprazole  Injectable 40 milliGRAM(s) IV Push daily  predniSONE   Tablet   Oral   predniSONE   Tablet 40 milliGRAM(s) Oral daily  tamsulosin 0.4 milliGRAM(s) Oral at bedtime  traZODone 150 milliGRAM(s) Oral daily      MEDICATIONS  (PRN):       Medications up to date at time of exam.      PHYSICAL EXAMINATION:  Patient has no new complaints.  GENERAL: The patient is a well-developed, well-nourished, in no apparent distress.     Vital Signs Last 24 Hrs  T(C): 36.6 (27 Dec 2023 17:29), Max: 37.7 (26 Dec 2023 20:00)  T(F): 97.9 (27 Dec 2023 17:29), Max: 99.9 (26 Dec 2023 20:00)  HR: 126 (27 Dec 2023 17:00) (83 - 126)  BP: 125/94 (27 Dec 2023 17:00) (101/57 - 138/80)  BP(mean): 104 (27 Dec 2023 17:00) (69 - 104)  RR: 24 (27 Dec 2023 16:00) (14 - 36)  SpO2: 93% (27 Dec 2023 17:00) (90% - 99%)    Parameters below as of 27 Dec 2023 17:00  Patient On (Oxygen Delivery Method): nasal cannula  O2 Flow (L/min): 2     (if applicable)    Chest Tube (if applicable)    HEENT: Head is normocephalic and atraumatic. Extraocular muscles are intact. Mucous membranes are moist.     NECK: Supple, no palpable adenopathy.    LUNGS: Clear to auscultation, no wheezing, rales, or rhonchi.    HEART: Regular rate and rhythm without murmur.    ABDOMEN: Soft, nontender, and nondistended.  No hepatosplenomegaly is noted.    : No painful voiding, no pelvic pain    EXTREMITIES: Without any cyanosis, clubbing, rash, lesions or edema.    NEUROLOGIC: Awake, alert, oriented, grossly intact    SKIN: Warm, dry, good turgor.      LABS:                        11.6   11.82 )-----------( 184      ( 27 Dec 2023 04:00 )             35.4     12-27    131<L>  |  92<L>  |  21<H>  ----------------------------<  340<H>  3.9   |  35<H>  |  0.61    Ca    8.2<L>      27 Dec 2023 04:00  Phos  3.1     12-27  Mg     1.7     12-27        Urinalysis Basic - ( 27 Dec 2023 04:00 )    Color: x / Appearance: x / SG: x / pH: x  Gluc: 340 mg/dL / Ketone: x  / Bili: x / Urobili: x   Blood: x / Protein: x / Nitrite: x   Leuk Esterase: x / RBC: x / WBC x   Sq Epi: x / Non Sq Epi: x / Bacteria: x      ABG - ( 27 Dec 2023 03:19 )  pH, Arterial: 7.49  pH, Blood: x     /  pCO2: 49    /  pO2: 78    / HCO3: 37    / Base Excess: 12.1  /  SaO2: 97                              MICROBIOLOGY: (if applicable)    RADIOLOGY & ADDITIONAL STUDIES:  EKG:   CXR:  ECHO:    IMPRESSION: 63y Male PAST MEDICAL & SURGICAL HISTORY:  COPD (chronic obstructive pulmonary disease)  Oxygen 2-3L at home      Stented coronary artery  2017      HLD (hyperlipidemia)      Pulmonary HTN      Type 2 diabetes mellitus without complication, with long-term current use of insulin      DM (diabetes mellitus)      CAD (coronary artery disease)      GERD (gastroesophageal reflux disease)      Insomnia      CHF (congestive heart failure)      HTN (hypertension)      Mood disorder      No significant past surgical history       p/w           RECOMMENDATIONS:   Time of Visit:  Patient seen and examined. pat seen and examined in ICU    MEDICATIONS  (STANDING):  albuterol/ipratropium for Nebulization 3 milliLiter(s) Nebulizer every 6 hours  aspirin enteric coated 81 milliGRAM(s) Oral daily  budesonide 160 MICROgram(s)/formoterol 4.5 MICROgram(s) Inhaler 2 Puff(s) Inhalation two times a day  cefTRIAXone   IVPB 1000 milliGRAM(s) IV Intermittent every 24 hours  finasteride 5 milliGRAM(s) Oral daily  gabapentin 100 milliGRAM(s) Oral three times a day  glucagon  Injectable 1 milliGRAM(s) IntraMuscular once  heparin   Injectable 5000 Unit(s) SubCutaneous every 8 hours  insulin glargine Injectable (LANTUS) 20 Unit(s) SubCutaneous at bedtime  insulin lispro (ADMELOG) corrective regimen sliding scale   SubCutaneous three times a day before meals  insulin lispro (ADMELOG) corrective regimen sliding scale   SubCutaneous at bedtime  insulin lispro Injectable (ADMELOG) 7 Unit(s) SubCutaneous three times a day before meals  latanoprost 0.005% Ophthalmic Solution 1 Drop(s) Both EYES at bedtime  lidocaine   4% Patch 1 Patch Transdermal daily  pantoprazole  Injectable 40 milliGRAM(s) IV Push daily  predniSONE   Tablet   Oral   predniSONE   Tablet 40 milliGRAM(s) Oral daily  tamsulosin 0.4 milliGRAM(s) Oral at bedtime  traZODone 150 milliGRAM(s) Oral daily      MEDICATIONS  (PRN):       Medications up to date at time of exam.      PHYSICAL EXAMINATION:  Patient has no new complaints.  GENERAL: The patient is a well-developed, well-nourished, in no apparent distress.     Vital Signs Last 24 Hrs  T(C): 36.6 (27 Dec 2023 17:29), Max: 37.7 (26 Dec 2023 20:00)  T(F): 97.9 (27 Dec 2023 17:29), Max: 99.9 (26 Dec 2023 20:00)  HR: 126 (27 Dec 2023 17:00) (83 - 126)  BP: 125/94 (27 Dec 2023 17:00) (101/57 - 138/80)  BP(mean): 104 (27 Dec 2023 17:00) (69 - 104)  RR: 24 (27 Dec 2023 16:00) (14 - 36)  SpO2: 93% (27 Dec 2023 17:00) (90% - 99%)    Parameters below as of 27 Dec 2023 17:00  Patient On (Oxygen Delivery Method): nasal cannula  O2 Flow (L/min): 2     (if applicable)    Chest Tube (if applicable)    HEENT: Head is normocephalic and atraumatic. Extraocular muscles are intact. Mucous membranes are moist.     NECK: Supple, no palpable adenopathy.    LUNGS: Clear to auscultation, no wheezing, rales, or rhonchi.    HEART: Regular rate and rhythm without murmur.    ABDOMEN: Soft, nontender, and nondistended.  No hepatosplenomegaly is noted.    : No painful voiding, no pelvic pain    EXTREMITIES: Without any cyanosis, clubbing, rash, lesions or edema.    NEUROLOGIC: Awake, alert, oriented, grossly intact    SKIN: Warm, dry, good turgor.      LABS:                        11.6   11.82 )-----------( 184      ( 27 Dec 2023 04:00 )             35.4     12-27    131<L>  |  92<L>  |  21<H>  ----------------------------<  340<H>  3.9   |  35<H>  |  0.61    Ca    8.2<L>      27 Dec 2023 04:00  Phos  3.1     12-27  Mg     1.7     12-27        Urinalysis Basic - ( 27 Dec 2023 04:00 )    Color: x / Appearance: x / SG: x / pH: x  Gluc: 340 mg/dL / Ketone: x  / Bili: x / Urobili: x   Blood: x / Protein: x / Nitrite: x   Leuk Esterase: x / RBC: x / WBC x   Sq Epi: x / Non Sq Epi: x / Bacteria: x      ABG - ( 27 Dec 2023 03:19 )  pH, Arterial: 7.49  pH, Blood: x     /  pCO2: 49    /  pO2: 78    / HCO3: 37    / Base Excess: 12.1  /  SaO2: 97        MICROBIOLOGY: (if applicable)    RADIOLOGY & ADDITIONAL STUDIES:  EKG:   CXR:  ECHO:    IMPRESSION: 63y Male PAST MEDICAL & SURGICAL HISTORY:  COPD (chronic obstructive pulmonary disease)  Oxygen 2-3L at home      Stented coronary artery  2017      HLD (hyperlipidemia)      Pulmonary HTN      Type 2 diabetes mellitus without complication, with long-term current use of insulin      DM (diabetes mellitus)      CAD (coronary artery disease)      GERD (gastroesophageal reflux disease)      Insomnia      CHF (congestive heart failure)      HTN (hypertension)      Mood disorder      No significant past surgical history       p/w       IMP:  This is a 63 yr old man with chronci hypoxic resp failure due to  COPD requiring supp O2  oxygen 3L / min  CAD, CHF presented to the ED with worsening SOB with respiratory distress.. In the ED he was found to be in copd exacerbation and in acute on chronic hypercapnic respiratory failure and he was intubated and placed on mechanical ventilation by the ED team. Soon after intubation he became hypotensive and was resuscitated with IV fluid boluses with NS to at least 4-5Liters.  At time of ICU evaluation, patient orally intubated  on mech vent and peripheral levophed on-going to maintain MAP >65. Patient is accepted to ICU for further management and closer monitoring.  Viral studies neg       ASSESSMENT   - Acute on chronic hypercapnic respiratory failure from COPD exacerbation s/p intubation.  - Acute exacerbation of COPD.  - Hypovolemia from dehydrated state,  - Right sided pneumonia  - Shock- hypovolemic, cardiogenic   - Hypovolemic hyponatremia, mild  - HTN CAD   - CHF   - Pulmonary HTN,      Sugg :     - Extubated   - No sedation   - Monitor pulse oximetry and supp as needed to maintain sat >90%  - Decrease solumedrol 40 mg /day   - Albuterol/Atrovent nebulizer q6hrs  - Antibx as per ICU team   - Bipap at night and as needed, on home bipap support  - CT chest non contrast to eval b/l full emre   - DVT / GI prophy     discussed with ICU team and attend

## 2023-12-27 NOTE — CHART NOTE - NSCHARTNOTEFT_GEN_A_CORE
<Hospital Course>   Patient is a 64 yo male from [facility], active smoker, with a PMHx of CAD, CHF on nocturnal BIPAP, COPD ( MULTIPLE intubations in past on 3L of home O2), peripheral neuropathy, GERD, HTN, HLD, obesity, polyarthritis, Pulmonary HTN, TIA, Vit D Def, presents with hypoxemia from nursing facility and lethargy. Upon evaluation in ED, patient afebrile, BP of 126/58, tachycardic to 115bpm, hypoxic placed on 15L NRBM saturating 97%. ABG showing ph of 7.17, CO2 of 145. Patient noted to be lethargic and subsequently intubated. Physical examination noted for dry mucus membranes, mark in place immediately draining 2L clear urine. Lungs clear to auscultation. Labs significant for no leukocytosis, lactate 0.9, negative cardiac enzymes and UA negative for infection. CT head negative for any acute intracranial processes. CT chest showing patchy opacities in the right middle lobe and bilateral lower lobes with areas of consolidation suspicious for infectious etiology. EKG sinus tachycardia 115bpm with RBBB with no acute ischemic changes. Patient admitted to ICU for Acute on chronic hypoxic hypercapnic respiratory failure 2/2 Suspected COPD exacerbation.       <ICU course>  Patient when admitted to ICU, treated for PNA with rocephin and azithromyocin. Patient remained on mechanical ventilator for 1 day. Hypercapnia resolved and the next day 12/26 patient was extubated. Mark removed and patient was found to be retaining urine straight cath was done resulting in >700 cc of urine. Patient found to be retaining and mark was replaced. Blood cultures Blood and urine cultures are negative to date. Sputum cultures noted to be growing pseudomonas. Legionella negative. Patient completed azithromycin and was started on prednisone taper.  PT recommended home PT.     Patient is stable for downgrade to floor under care of  ------------- for further management , covering resident ---------- was informed.    <Things to follow up>. <Hospital Course>   Patient is a 64 yo male from [facility], active smoker, with a PMHx of CAD, CHF on nocturnal BIPAP, COPD ( MULTIPLE intubations in past on 3L of home O2), peripheral neuropathy, GERD, HTN, HLD, obesity, polyarthritis, Pulmonary HTN, TIA, Vit D Def, presents with hypoxemia from nursing facility and lethargy. Upon evaluation in ED, patient afebrile, BP of 126/58, tachycardic to 115bpm, hypoxic placed on 15L NRBM saturating 97%. ABG showing ph of 7.17, CO2 of 145. Patient noted to be lethargic and subsequently intubated. Physical examination noted for dry mucus membranes, mark in place immediately draining 2L clear urine. Lungs clear to auscultation. Labs significant for no leukocytosis, lactate 0.9, negative cardiac enzymes and UA negative for infection. CT head negative for any acute intracranial processes. CT chest showing patchy opacities in the right middle lobe and bilateral lower lobes with areas of consolidation suspicious for infectious etiology. EKG sinus tachycardia 115bpm with RBBB with no acute ischemic changes. Patient admitted to ICU for Acute on chronic hypoxic hypercapnic respiratory failure 2/2 Suspected COPD exacerbation.       <ICU course>  Patient when admitted to ICU, treated for PNA with rocephin and azithromyocin. Patient remained on mechanical ventilator for 1 day. Hypercapnia resolved and the next day 12/26 patient was extubated. Mark removed and patient was found to be retaining urine straight cath was done resulting in >700 cc of urine. Patient found to be retaining and mark was replaced. Blood cultures Blood and urine cultures are negative to date. Sputum cultures noted to be growing pseudomonas. Legionella negative. Patient completed azithromycin and was started on prednisone taper.  PT recommended home PT.     Patient is stable for downgrade to floor under care of  ------------- for further management , covering resident ---------- was informed.    <Things to follow up>. <Hospital Course>   Patient is a 64 yo male from [facility], active smoker, with a PMH of CAD, CHF on nocturnal BiPAP, COPD ( MULTIPLE intubations in past on 3L of home O2), peripheral neuropathy, GERD, HTN, HLD, obesity, polyarthritis, Pulmonary HTN, TIA, Vit D Def, presents with hypoxemia from nursing facility and lethargy. Upon evaluation in ED, patient afebrile, BP of 126/58, tachycardic to 115bpm, hypoxic placed on 15L NRBM saturating 97%. ABG showing ph of 7.17, CO2 of 145. Patient noted to be lethargic and subsequently intubated. Physical examination noted for dry mucus membranes, Tejada in place immediately draining 2L clear urine. Lungs clear to auscultation. Labs significant for no leukocytosis, lactate 0.9, negative cardiac enzymes and UA negative for infection. CT head negative for any acute intracranial processes. CT chest showing patchy opacities in the right middle lobe and bilateral lower lobes with areas of consolidation suspicious for infectious etiology. EKG sinus tachycardia 115bpm with RBBB with no acute ischemic changes. Patient admitted to ICU for Acute on chronic hypoxic hypercapnic respiratory failure 2/2 Suspected COPD exacerbation.       <ICU course>  Patient when admitted to ICU, treated for PNA with Rocephin and azithromycin. Patient remained on mechanical ventilator for 1 day. Hypercapnia resolved and the next day 12/26 patient was extubated. Tejada removed and patient was found to be retaining urine straight cath was done resulting in >700 cc of urine. Patient found to be retaining and Tejada was replaced. Blood cultures Blood and urine cultures are negative to date. Sputum cultures noted to be growing pseudomonas. Legionella negative. Patient completed azithromycin and was started on prednisone taper.  PT recommended home PT. Patient refusing nocturnal BiPAP.     Patient is stable for downgrade to floor under care of Dr. Goodman  for further management , covering NP.---------- was informed.    <Things to follow up>  - antibiotic duration  - sputum cultures   - Pulmonary reccs   - Tejada reccs   - Nocturnal BiPap <Hospital Course>   Patient is a 62 yo male from [facility], active smoker, with a PMH of CAD, CHF on nocturnal BiPAP, COPD ( MULTIPLE intubations in past on 3L of home O2), peripheral neuropathy, GERD, HTN, HLD, obesity, polyarthritis, Pulmonary HTN, TIA, Vit D Def, presents with hypoxemia from nursing facility and lethargy. Upon evaluation in ED, patient afebrile, BP of 126/58, tachycardic to 115bpm, hypoxic placed on 15L NRBM saturating 97%. ABG showing ph of 7.17, CO2 of 145. Patient noted to be lethargic and subsequently intubated. Physical examination noted for dry mucus membranes, Tejada in place immediately draining 2L clear urine. Lungs clear to auscultation. Labs significant for no leukocytosis, lactate 0.9, negative cardiac enzymes and UA negative for infection. CT head negative for any acute intracranial processes. CT chest showing patchy opacities in the right middle lobe and bilateral lower lobes with areas of consolidation suspicious for infectious etiology. EKG sinus tachycardia 115bpm with RBBB with no acute ischemic changes. Patient admitted to ICU for Acute on chronic hypoxic hypercapnic respiratory failure 2/2 Suspected COPD exacerbation.       <ICU course>  Patient when admitted to ICU, treated for PNA with Rocephin and azithromycin. Patient remained on mechanical ventilator for 1 day. Hypercapnia resolved and the next day 12/26 patient was extubated. Tejada removed and patient was found to be retaining urine straight cath was done resulting in >700 cc of urine. Patient found to be retaining and Tejada was replaced. Blood cultures Blood and urine cultures are negative to date. Sputum cultures noted to be growing pseudomonas. Legionella negative. Patient completed azithromycin and was started on prednisone taper.  PT recommended home PT. Patient refusing nocturnal BiPAP.     Patient is stable for downgrade to floor under care of Dr. Goodman  for further management , covering NP.---------- was informed.    <Things to follow up>  - antibiotic duration  - sputum cultures   - Pulmonary reccs   - Tejada reccs   - Nocturnal BiPap <Hospital Course>   Patient is a 64 yo male from [facility], active smoker, with a PMH of CAD, CHF on nocturnal BiPAP, COPD ( MULTIPLE intubations in past on 3L of home O2), peripheral neuropathy, GERD, HTN, HLD, obesity, polyarthritis, Pulmonary HTN, TIA, Vit D Def, presents with hypoxemia from nursing facility and lethargy. Upon evaluation in ED, patient afebrile, BP of 126/58, tachycardic to 115bpm, hypoxic placed on 15L NRBM saturating 97%. ABG showing ph of 7.17, CO2 of 145. Patient noted to be lethargic and subsequently intubated. Physical examination noted for dry mucus membranes, Tejada in place immediately draining 2L clear urine. Lungs clear to auscultation. Labs significant for no leukocytosis, lactate 0.9, negative cardiac enzymes and UA negative for infection. CT head negative for any acute intracranial processes. CT chest showing patchy opacities in the right middle lobe and bilateral lower lobes with areas of consolidation suspicious for infectious etiology. EKG sinus tachycardia 115bpm with RBBB with no acute ischemic changes. Patient admitted to ICU for Acute on chronic hypoxic hypercapnic respiratory failure 2/2 Suspected COPD exacerbation.       <ICU course>  Patient when admitted to ICU, treated for PNA with Rocephin and azithromycin. Patient remained on mechanical ventilator for 1 day. Hypercapnia resolved and the next day 12/26 patient was extubated. Tejada removed and patient was found to be retaining urine straight cath was done resulting in >700 cc of urine. Patient found to be retaining and Tejada was replaced. Blood cultures Blood and urine cultures are negative to date. Sputum cultures noted to be growing pseudomonas. Legionella negative. Patient completed azithromycin and was started on prednisone taper.  PT recommended home PT. Patient refusing nocturnal BiPAP.     Patient is stable for downgrade to floor under care of Dr. Lopez for further management , covering NP Arcadio Francois was informed.    <Things to follow up>  - antibiotic duration  - sputum cultures   - Pulmonary reccs   - Tejada reccs   - Nocturnal BiPap <Hospital Course>   Patient is a 62 yo male from [facility], active smoker, with a PMH of CAD, CHF on nocturnal BiPAP, COPD ( MULTIPLE intubations in past on 3L of home O2), peripheral neuropathy, GERD, HTN, HLD, obesity, polyarthritis, Pulmonary HTN, TIA, Vit D Def, presents with hypoxemia from nursing facility and lethargy. Upon evaluation in ED, patient afebrile, BP of 126/58, tachycardic to 115bpm, hypoxic placed on 15L NRBM saturating 97%. ABG showing ph of 7.17, CO2 of 145. Patient noted to be lethargic and subsequently intubated. Physical examination noted for dry mucus membranes, Tejada in place immediately draining 2L clear urine. Lungs clear to auscultation. Labs significant for no leukocytosis, lactate 0.9, negative cardiac enzymes and UA negative for infection. CT head negative for any acute intracranial processes. CT chest showing patchy opacities in the right middle lobe and bilateral lower lobes with areas of consolidation suspicious for infectious etiology. EKG sinus tachycardia 115bpm with RBBB with no acute ischemic changes. Patient admitted to ICU for Acute on chronic hypoxic hypercapnic respiratory failure 2/2 Suspected COPD exacerbation.       <ICU course>  Patient when admitted to ICU, treated for PNA with Rocephin and azithromycin. Patient remained on mechanical ventilator for 1 day. Hypercapnia resolved and the next day 12/26 patient was extubated. Tejada removed and patient was found to be retaining urine straight cath was done resulting in >700 cc of urine. Patient found to be retaining and Tejada was replaced. Blood cultures Blood and urine cultures are negative to date. Sputum cultures noted to be growing pseudomonas. Legionella negative. Patient completed azithromycin and was started on prednisone taper.  PT recommended home PT. Patient refusing nocturnal BiPAP.     Patient is stable for downgrade to floor under care of Dr. Lopez for further management , covering NP Arcadio Francois was informed.    <Things to follow up>  - antibiotic duration  - sputum cultures   - Pulmonary reccs   - Tejada reccs   - Nocturnal BiPap

## 2023-12-27 NOTE — DIETITIAN INITIAL EVALUATION ADULT - OTHER INFO
Pt from assisted living facility, s/p extubated 12/26/23, visited in ICU, alert, oriented, weak, but well-communicated; Reported appetite good, denied recent wt changes, denied GI distress, chewing or swallowing problem at present; Unknown food allergies, no specific food choices, tolerating 100% meals reported; h/o DM, finger stick ywirq=486 to 330, Glucose=391->340, [id number]=25.2% 10/5/23, new level pending, on Steroid Rx noted    Pt from assisted living facility, s/p extubated 12/26/23, visited in ICU, alert, oriented, weak, but well-communicated; Reported appetite good, denied recent wt changes, denied GI distress, chewing or swallowing problem at present; Unknown food allergies, no specific food choices, tolerating 100% meals reported; h/o DM, finger stick ablsw=490 to 330, Glucose=391->340, [id number]=32.2% 10/5/23, new level pending, on Steroid Rx noted

## 2023-12-27 NOTE — PROGRESS NOTE ADULT - SUBJECTIVE AND OBJECTIVE BOX
INTERVAL HPI/OVERNIGHT EVENTS:       PRESSORS: [ ] YES [ ] NO  WHICH:    ANTIBIOTICS:                  DATE STARTED:  ANTIBIOTICS:                  DATE STARTED:    Antimicrobial:  cefTRIAXone   IVPB 1000 milliGRAM(s) IV Intermittent every 24 hours    Cardiovascular:    Pulmonary:  albuterol/ipratropium for Nebulization 3 milliLiter(s) Nebulizer every 6 hours  budesonide 160 MICROgram(s)/formoterol 4.5 MICROgram(s) Inhaler 2 Puff(s) Inhalation two times a day    Hematalogic:  aspirin enteric coated 81 milliGRAM(s) Oral daily  heparin   Injectable 5000 Unit(s) SubCutaneous every 8 hours    Other:  finasteride 5 milliGRAM(s) Oral daily  gabapentin 100 milliGRAM(s) Oral three times a day  glucagon  Injectable 1 milliGRAM(s) IntraMuscular once  insulin glargine Injectable (LANTUS) 20 Unit(s) SubCutaneous at bedtime  insulin lispro (ADMELOG) corrective regimen sliding scale   SubCutaneous at bedtime  insulin lispro (ADMELOG) corrective regimen sliding scale   SubCutaneous three times a day before meals  insulin lispro Injectable (ADMELOG) 7 Unit(s) SubCutaneous three times a day before meals  latanoprost 0.005% Ophthalmic Solution 1 Drop(s) Both EYES at bedtime  lidocaine   4% Patch 1 Patch Transdermal daily  pantoprazole  Injectable 40 milliGRAM(s) IV Push daily  predniSONE   Tablet   Oral   predniSONE   Tablet 40 milliGRAM(s) Oral daily  tamsulosin 0.4 milliGRAM(s) Oral at bedtime  traZODone 150 milliGRAM(s) Oral daily    albuterol/ipratropium for Nebulization 3 milliLiter(s) Nebulizer every 6 hours  aspirin enteric coated 81 milliGRAM(s) Oral daily  budesonide 160 MICROgram(s)/formoterol 4.5 MICROgram(s) Inhaler 2 Puff(s) Inhalation two times a day  cefTRIAXone   IVPB 1000 milliGRAM(s) IV Intermittent every 24 hours  finasteride 5 milliGRAM(s) Oral daily  gabapentin 100 milliGRAM(s) Oral three times a day  glucagon  Injectable 1 milliGRAM(s) IntraMuscular once  heparin   Injectable 5000 Unit(s) SubCutaneous every 8 hours  insulin glargine Injectable (LANTUS) 20 Unit(s) SubCutaneous at bedtime  insulin lispro (ADMELOG) corrective regimen sliding scale   SubCutaneous three times a day before meals  insulin lispro (ADMELOG) corrective regimen sliding scale   SubCutaneous at bedtime  insulin lispro Injectable (ADMELOG) 7 Unit(s) SubCutaneous three times a day before meals  latanoprost 0.005% Ophthalmic Solution 1 Drop(s) Both EYES at bedtime  lidocaine   4% Patch 1 Patch Transdermal daily  pantoprazole  Injectable 40 milliGRAM(s) IV Push daily  predniSONE   Tablet   Oral   predniSONE   Tablet 40 milliGRAM(s) Oral daily  tamsulosin 0.4 milliGRAM(s) Oral at bedtime  traZODone 150 milliGRAM(s) Oral daily    Drug Dosing Weight  Height (cm): 185.4 (06 Oct 2023 18:01)  Weight (kg): 88.7 (27 Dec 2023 07:57)  BMI (kg/m2): 25.8 (27 Dec 2023 07:57)  BSA (m2): 2.13 (27 Dec 2023 07:57)    PHYSICAL EXAM:  GENERAL: NAD  EYES: EOMI, PERRLA  NECK: Supple, No JVD; Trachea midline: No LAD   NERVOUS SYSTEM:  Alert & Oriented X3,  Motor Strength 5/5 B/L upper and lower extremities  CHEST/LUNG:  breath sounds present bilaterally, No rales, rhonchi, wheezing  HEART: Regular rate and rhythm; No murmurs, rubs, or gallops  ABDOMEN: Soft, Nontender, Nondistended; Bowel sounds present, no pain or masses on palpation  : voiding well, Amor in place  EXTREMITIES:  2+ Peripheral Pulses, No clubbing, cyanosis, or edema  SKIN: warm, intact, no lesions     LINES/DRAINS/DEVICES  CENTRAL LINE: [ ] YES [ ] NO  LOCATION:     AMOR: [ ] YES [ ] NO     A-LINE:  [ ] YES [ ] NO  LOCATION:       ICU Vital Signs Last 24 Hrs  T(C): 36.5 (27 Dec 2023 12:00), Max: 37.9 (26 Dec 2023 18:00)  T(F): 97.7 (27 Dec 2023 12:00), Max: 100.2 (26 Dec 2023 18:00)  HR: 123 (27 Dec 2023 13:00) (76 - 123)  BP: 130/76 (27 Dec 2023 13:00) (95/58 - 134/70)  BP(mean): 93 (27 Dec 2023 13:00) (67 - 93)  ABP: --  ABP(mean): --  RR: 23 (27 Dec 2023 13:00) (13 - 36)  SpO2: 92% (27 Dec 2023 13:00) (90% - 99%)    O2 Parameters below as of 27 Dec 2023 12:00  Patient On (Oxygen Delivery Method): nasal cannula  O2 Flow (L/min): 2          ABG - ( 27 Dec 2023 03:19 )  pH, Arterial: 7.49  pH, Blood: x     /  pCO2: 49    /  pO2: 78    / HCO3: 37    / Base Excess: 12.1  /  SaO2: 97                    12-26 @ 07:01  -  12-27 @ 07:00  --------------------------------------------------------  IN: 500 mL / OUT: 1345 mL / NET: -845 mL              LABS:  CBC Full  -  ( 27 Dec 2023 04:00 )  WBC Count : 11.82 K/uL  RBC Count : 4.18 M/uL  Hemoglobin : 11.6 g/dL  Hematocrit : 35.4 %  Platelet Count - Automated : 184 K/uL  Mean Cell Volume : 84.7 fl  Mean Cell Hemoglobin : 27.8 pg  Mean Cell Hemoglobin Concentration : 32.8 gm/dL  Auto Neutrophil # : 10.19 K/uL  Auto Lymphocyte # : 0.83 K/uL  Auto Monocyte # : 0.62 K/uL  Auto Eosinophil # : 0.03 K/uL  Auto Basophil # : 0.02 K/uL  Auto Neutrophil % : 86.2 %  Auto Lymphocyte % : 7.0 %  Auto Monocyte % : 5.2 %  Auto Eosinophil % : 0.3 %  Auto Basophil % : 0.2 %    12-27    131<L>  |  92<L>  |  21<H>  ----------------------------<  340<H>  3.9   |  35<H>  |  0.61    Ca    8.2<L>      27 Dec 2023 04:00  Phos  3.1     12-27  Mg     1.7     12-27        Urinalysis Basic - ( 27 Dec 2023 04:00 )    Color: x / Appearance: x / SG: x / pH: x  Gluc: 340 mg/dL / Ketone: x  / Bili: x / Urobili: x   Blood: x / Protein: x / Nitrite: x   Leuk Esterase: x / RBC: x / WBC x   Sq Epi: x / Non Sq Epi: x / Bacteria: x      Culture Results:   Mixed gram negative rods including  Numerous Pseudomonas aeruginosa  Normal Respiratory Joyce present (12-25 @ 18:00)  Culture Results:   No growth at 48 Hours (12-24 @ 20:20)  Culture Results:   No growth at 48 Hours (12-24 @ 20:20)  Culture Results:   No growth (12-24 @ 20:20)      RADIOLOGY & ADDITIONAL STUDIES REVIEWED DURING TEAM ROUNDS    [ ]GOALS OF CARE DISCUSSION WITH PATIENT/FAMILY/PROXY:     INTERVAL HPI/OVERNIGHT EVENTS:       PRESSORS: [ ] YES [ x] NO  WHICH:    ANTIBIOTICS:                  DATE STARTED:  ANTIBIOTICS:                  DATE STARTED:    Antimicrobial:  cefTRIAXone   IVPB 1000 milliGRAM(s) IV Intermittent every 24 hours    Cardiovascular:    Pulmonary:  albuterol/ipratropium for Nebulization 3 milliLiter(s) Nebulizer every 6 hours  budesonide 160 MICROgram(s)/formoterol 4.5 MICROgram(s) Inhaler 2 Puff(s) Inhalation two times a day    Hematalogic:  aspirin enteric coated 81 milliGRAM(s) Oral daily  heparin   Injectable 5000 Unit(s) SubCutaneous every 8 hours    Other:  finasteride 5 milliGRAM(s) Oral daily  gabapentin 100 milliGRAM(s) Oral three times a day  glucagon  Injectable 1 milliGRAM(s) IntraMuscular once  insulin glargine Injectable (LANTUS) 20 Unit(s) SubCutaneous at bedtime  insulin lispro (ADMELOG) corrective regimen sliding scale   SubCutaneous at bedtime  insulin lispro (ADMELOG) corrective regimen sliding scale   SubCutaneous three times a day before meals  insulin lispro Injectable (ADMELOG) 7 Unit(s) SubCutaneous three times a day before meals  latanoprost 0.005% Ophthalmic Solution 1 Drop(s) Both EYES at bedtime  lidocaine   4% Patch 1 Patch Transdermal daily  pantoprazole  Injectable 40 milliGRAM(s) IV Push daily  predniSONE   Tablet   Oral   predniSONE   Tablet 40 milliGRAM(s) Oral daily  tamsulosin 0.4 milliGRAM(s) Oral at bedtime  traZODone 150 milliGRAM(s) Oral daily    albuterol/ipratropium for Nebulization 3 milliLiter(s) Nebulizer every 6 hours  aspirin enteric coated 81 milliGRAM(s) Oral daily  budesonide 160 MICROgram(s)/formoterol 4.5 MICROgram(s) Inhaler 2 Puff(s) Inhalation two times a day  cefTRIAXone   IVPB 1000 milliGRAM(s) IV Intermittent every 24 hours  finasteride 5 milliGRAM(s) Oral daily  gabapentin 100 milliGRAM(s) Oral three times a day  glucagon  Injectable 1 milliGRAM(s) IntraMuscular once  heparin   Injectable 5000 Unit(s) SubCutaneous every 8 hours  insulin glargine Injectable (LANTUS) 20 Unit(s) SubCutaneous at bedtime  insulin lispro (ADMELOG) corrective regimen sliding scale   SubCutaneous three times a day before meals  insulin lispro (ADMELOG) corrective regimen sliding scale   SubCutaneous at bedtime  insulin lispro Injectable (ADMELOG) 7 Unit(s) SubCutaneous three times a day before meals  latanoprost 0.005% Ophthalmic Solution 1 Drop(s) Both EYES at bedtime  lidocaine   4% Patch 1 Patch Transdermal daily  pantoprazole  Injectable 40 milliGRAM(s) IV Push daily  predniSONE   Tablet   Oral   predniSONE   Tablet 40 milliGRAM(s) Oral daily  tamsulosin 0.4 milliGRAM(s) Oral at bedtime  traZODone 150 milliGRAM(s) Oral daily    Drug Dosing Weight  Height (cm): 185.4 (06 Oct 2023 18:01)  Weight (kg): 88.7 (27 Dec 2023 07:57)  BMI (kg/m2): 25.8 (27 Dec 2023 07:57)  BSA (m2): 2.13 (27 Dec 2023 07:57)    PHYSICAL EXAM:  GENERAL: NAD  EYES: EOMI, PERRLA  NECK: Supple, No JVD; Trachea midline: No LAD   NERVOUS SYSTEM:  Alert & Oriented X3,  Motor Strength 5/5 B/L upper and lower extremities  CHEST/LUNG:  breath sounds present bilaterally, No rales, rhonchi, wheezing  HEART: Regular rate and rhythm; No murmurs, rubs, or gallops  ABDOMEN: Soft, Nontender, Nondistended; Bowel sounds present, no pain or masses on palpation  : voiding well, Amor in place  EXTREMITIES:  2+ Peripheral Pulses, No clubbing, cyanosis, or edema  SKIN: warm, intact, no lesions     LINES/DRAINS/DEVICES  CENTRAL LINE: [ ] YES [ x] NO  LOCATION:     AMOR: [x ] YES [ ] NO     A-LINE:  [ ] YES [ x] NO  LOCATION:       ICU Vital Signs Last 24 Hrs  T(C): 36.5 (27 Dec 2023 12:00), Max: 37.9 (26 Dec 2023 18:00)  T(F): 97.7 (27 Dec 2023 12:00), Max: 100.2 (26 Dec 2023 18:00)  HR: 123 (27 Dec 2023 13:00) (76 - 123)  BP: 130/76 (27 Dec 2023 13:00) (95/58 - 134/70)  BP(mean): 93 (27 Dec 2023 13:00) (67 - 93)  ABP: --  ABP(mean): --  RR: 23 (27 Dec 2023 13:00) (13 - 36)  SpO2: 92% (27 Dec 2023 13:00) (90% - 99%)    O2 Parameters below as of 27 Dec 2023 12:00  Patient On (Oxygen Delivery Method): nasal cannula  O2 Flow (L/min): 2          ABG - ( 27 Dec 2023 03:19 )  pH, Arterial: 7.49  pH, Blood: x     /  pCO2: 49    /  pO2: 78    / HCO3: 37    / Base Excess: 12.1  /  SaO2: 97                    12-26 @ 07:01  -  12-27 @ 07:00  --------------------------------------------------------  IN: 500 mL / OUT: 1345 mL / NET: -845 mL              LABS:  CBC Full  -  ( 27 Dec 2023 04:00 )  WBC Count : 11.82 K/uL  RBC Count : 4.18 M/uL  Hemoglobin : 11.6 g/dL  Hematocrit : 35.4 %  Platelet Count - Automated : 184 K/uL  Mean Cell Volume : 84.7 fl  Mean Cell Hemoglobin : 27.8 pg  Mean Cell Hemoglobin Concentration : 32.8 gm/dL  Auto Neutrophil # : 10.19 K/uL  Auto Lymphocyte # : 0.83 K/uL  Auto Monocyte # : 0.62 K/uL  Auto Eosinophil # : 0.03 K/uL  Auto Basophil # : 0.02 K/uL  Auto Neutrophil % : 86.2 %  Auto Lymphocyte % : 7.0 %  Auto Monocyte % : 5.2 %  Auto Eosinophil % : 0.3 %  Auto Basophil % : 0.2 %    12-27    131<L>  |  92<L>  |  21<H>  ----------------------------<  340<H>  3.9   |  35<H>  |  0.61    Ca    8.2<L>      27 Dec 2023 04:00  Phos  3.1     12-27  Mg     1.7     12-27        Urinalysis Basic - ( 27 Dec 2023 04:00 )    Color: x / Appearance: x / SG: x / pH: x  Gluc: 340 mg/dL / Ketone: x  / Bili: x / Urobili: x   Blood: x / Protein: x / Nitrite: x   Leuk Esterase: x / RBC: x / WBC x   Sq Epi: x / Non Sq Epi: x / Bacteria: x      Culture Results:   Mixed gram negative rods including  Numerous Pseudomonas aeruginosa  Normal Respiratory Joyce present (12-25 @ 18:00)  Culture Results:   No growth at 48 Hours (12-24 @ 20:20)  Culture Results:   No growth at 48 Hours (12-24 @ 20:20)  Culture Results:   No growth (12-24 @ 20:20)      RADIOLOGY & ADDITIONAL STUDIES REVIEWED DURING TEAM ROUNDS    [ ]GOALS OF CARE DISCUSSION WITH PATIENT/FAMILY/PROXY:

## 2023-12-27 NOTE — PROGRESS NOTE ADULT - ASSESSMENT
Patient is a 64 yo male from [facility], active smoker, with a PMHx of CAD, CHF on nocturnal BIPAP, COPD ( MULTIPLE intubations in past on 3L of home O2), peripheral neuropathy, GERD, HTN, HLD, obesity, polyarthritis, Pulmonary HTN, TIA, Vit D Def, presents with hypoxemia from nursing facility and lethargy. Upon evaluation in ED, patient afebrile, BP of 126/58, tachycardic to 115bpm, hypoxic placed on 15L NRBM saturating 97%. ABG showing ph of 7.17, CO2 of 145. Patient noted to be lethargic and subsequently intubated. Physical examination noted for dry mucus membranes, mark in place immediately draining 2L clear urine. Lungs clear to auscultation. Labs significant for no leukocytosis, lactate 0.9, negative cardiac enzymes and UA negative for infection. CT head negative for any acute intracranial processes. CT chest showing patchy opacities in the right middle lobe and bilateral lower lobes with areas of consolidation suspicious for infectious etiology. EKG sinus tachycardia 115bpm with RBBB with no acute ischemic changes. Patient admitted to ICU for Acute on chronic hypoxic hypercapnic respiratory failure 2/2 Suspected COPD exacerbation.       # Acute encephalopathy   # Acute on chronic hypoxic hypercapnic respiratory failure 2/2 suspected COPD exacerbation   # Hx of COPD  # Hx of DM   # Chronic CHF   # Hx of CAD  # HTN  # HLD  # GERD   # Urinary retention     Neurological  # Acute encephalopathy  - patient noted to be lethargic upon arrival, intubated for airway protection  - noted to have C02 level of 145, CT head negative for any acute infarct, UA negative   - Acute encephalopathy likely 2/2 Acute on chronic CO2 retention   - continue with sedation with propofol   - bcx NGTG @24h  - post-intubation ABG improved, likely with CO2 chronically in the 80s  - mental status improved 12/26, extubated    Pulmonary  # Acute on chronic hypoxic hypercapnic respiratory failure   - noted to have hx of COPD on Nocturnal bipap 12/6  - patient intubated in ED, due to lethargy   - likely 2/2 CO2 retention  - CT chest showing patchy opacities in right middle lobes and bilateral lobes   - continue with home nebulizers   - continue with ceftriaxone   - continue with solumedrol   - extubated 12/26    #Hx of COPD  - patient noted to be on symbicort  - c/w nebs  - CT chest showing patchy opacities in right middle lobes and bilateral lobes   - post-intubation ABG improving  - continue with home nebulizers  - continue with ceftriaxone  - continue with solumedrol   - extubated 12/26    Cardiac  # HX of CAD   - patient noted to be on ASA at home   - resume home meds now that extubated   - troponin noted to be negative    #HTN   - patient noted to be on antihypertensives at home   - noted to be on propofol, patient is normotensive  - hold home anti hypertensives for now     #Chronic CHF   - patient noted to have hx of Chronic CHF  - last echo noted to show EF>70%  - will resume home medications    Renal  - no acute issues     Infectious disease  #Suspected PNA on CT chest    CT chest showing patchy opacities in right middle lobes and bilateral lobes   - continue with home nebulizers   - continue with ceftriaxone   - continue with solumedrol   - bcx and sputum cx NGTD 24h  - f/u mycoplasma, strep    Heme  - no acute issues    GI  - resume diet  - protonix     Endo     #HX of DM  - noted to be on lantus 30 units QHS  - on SSI  - q6hrs finger sticks  - add lantus 15      #Urinary retention   - patient noted to produce 2L of urine after mark was placed  - UA negative for infection     Skin  - no acute issues    Lines  - peripheral IV lines    Disposition   ICU Patient is a 62 yo male from [facility], active smoker, with a PMHx of CAD, CHF on nocturnal BIPAP, COPD ( MULTIPLE intubations in past on 3L of home O2), peripheral neuropathy, GERD, HTN, HLD, obesity, polyarthritis, Pulmonary HTN, TIA, Vit D Def, presents with hypoxemia from nursing facility and lethargy. Upon evaluation in ED, patient afebrile, BP of 126/58, tachycardic to 115bpm, hypoxic placed on 15L NRBM saturating 97%. ABG showing ph of 7.17, CO2 of 145. Patient noted to be lethargic and subsequently intubated. Physical examination noted for dry mucus membranes, mark in place immediately draining 2L clear urine. Lungs clear to auscultation. Labs significant for no leukocytosis, lactate 0.9, negative cardiac enzymes and UA negative for infection. CT head negative for any acute intracranial processes. CT chest showing patchy opacities in the right middle lobe and bilateral lower lobes with areas of consolidation suspicious for infectious etiology. EKG sinus tachycardia 115bpm with RBBB with no acute ischemic changes. Patient admitted to ICU for Acute on chronic hypoxic hypercapnic respiratory failure 2/2 Suspected COPD exacerbation.       # Acute encephalopathy   # Acute on chronic hypoxic hypercapnic respiratory failure 2/2 suspected COPD exacerbation   # Hx of COPD  # Hx of DM   # Chronic CHF   # Hx of CAD  # HTN  # HLD  # GERD   # Urinary retention     Neurological  # Acute encephalopathy  - patient noted to be lethargic upon arrival, intubated for airway protection  - noted to have C02 level of 145, CT head negative for any acute infarct, UA negative   - Acute encephalopathy likely 2/2 Acute on chronic CO2 retention   - continue with sedation with propofol   - bcx NGTG @24h  - post-intubation ABG improved, likely with CO2 chronically in the 80s  - mental status improved 12/26, extubated    Pulmonary  # Acute on chronic hypoxic hypercapnic respiratory failure   - noted to have hx of COPD on Nocturnal bipap 12/6  - patient intubated in ED, due to lethargy   - likely 2/2 CO2 retention  - CT chest showing patchy opacities in right middle lobes and bilateral lobes   - continue with home nebulizers   - continue with ceftriaxone   - continue with solumedrol   - extubated 12/26    #Hx of COPD  - patient noted to be on symbicort  - c/w nebs  - CT chest showing patchy opacities in right middle lobes and bilateral lobes   - post-intubation ABG improving  - continue with home nebulizers  - continue with ceftriaxone  - continue with solumedrol   - extubated 12/26    Cardiac  # HX of CAD   - patient noted to be on ASA at home   - resume home meds now that extubated   - troponin noted to be negative    #HTN   - patient noted to be on antihypertensives at home   - noted to be on propofol, patient is normotensive  - hold home anti hypertensives for now     #Chronic CHF   - patient noted to have hx of Chronic CHF  - last echo noted to show EF>70%  - will resume home medications    Renal  - no acute issues     Infectious disease  #Suspected PNA on CT chest    CT chest showing patchy opacities in right middle lobes and bilateral lobes   - continue with home nebulizers   - continue with ceftriaxone   - continue with solumedrol   - bcx and sputum cx NGTD 24h  - f/u mycoplasma, strep    Heme  - no acute issues    GI  - resume diet  - protonix     Endo     #HX of DM  - noted to be on lantus 30 units QHS  - on SSI  - q6hrs finger sticks  - add lantus 15      #Urinary retention   - patient noted to produce 2L of urine after mark was placed  - UA negative for infection     Skin  - no acute issues    Lines  - peripheral IV lines    Disposition   ICU Patient is a 64 yo male from [facility], active smoker, with a PMHx of CAD, CHF on nocturnal BIPAP, COPD ( MULTIPLE intubations in past on 3L of home O2), peripheral neuropathy, GERD, HTN, HLD, obesity, polyarthritis, Pulmonary HTN, TIA, Vit D Def, presents with hypoxemia from nursing facility and lethargy. Upon evaluation in ED, patient afebrile, BP of 126/58, tachycardic to 115bpm, hypoxic placed on 15L NRBM saturating 97%. ABG showing ph of 7.17, CO2 of 145. Patient noted to be lethargic and subsequently intubated. Physical examination noted for dry mucus membranes, mark in place immediately draining 2L clear urine. Lungs clear to auscultation. Labs significant for no leukocytosis, lactate 0.9, negative cardiac enzymes and UA negative for infection. CT head negative for any acute intracranial processes. CT chest showing patchy opacities in the right middle lobe and bilateral lower lobes with areas of consolidation suspicious for infectious etiology. EKG sinus tachycardia 115bpm with RBBB with no acute ischemic changes. Patient admitted to ICU for Acute on chronic hypoxic hypercapnic respiratory failure 2/2 Suspected COPD exacerbation.       # Acute encephalopathy   # Acute on chronic hypoxic hypercapnic respiratory failure 2/2 suspected COPD exacerbation   # Hx of COPD  # Hx of DM   # Chronic CHF   # Hx of CAD  # HTN  # HLD  # GERD   # Urinary retention     Neurological  # Acute encephalopathy  - patient noted to be lethargic upon arrival, intubated for airway protection  - noted to have C02 level of 145, CT head negative for any acute infarct, UA negative   - Acute encephalopathy likely 2/2 Acute on chronic CO2 retention   - continue with sedation with propofol   - bcx NGTG @24h  - post-intubation ABG improved, likely with CO2 chronically in the 80s  - mental status improved 12/26, extubated    Pulmonary  # Acute on chronic hypoxic hypercapnic respiratory failure   - noted to have hx of COPD on Nocturnal bipap 12/6  - patient intubated in ED, due to lethargy   - likely 2/2 CO2 retention  - CT chest showing patchy opacities in right middle lobes and bilateral lobes   - continue with home nebulizers   - continue with ceftriaxone   - continue with solumedrol   - extubated 12/26    #Hx of COPD  - patient noted to be on symbicort  - c/w nebs  - CT chest showing patchy opacities in right middle lobes and bilateral lobes   - post-intubation ABG improving  - continue with home nebulizers  - continue with ceftriaxone  - continue with solumedrol   - extubated 12/26    Cardiac  # HX of CAD   - patient noted to be on ASA at home   - resume home meds now that extubated   - troponin noted to be negative    #HTN   - patient noted to be on antihypertensives at home   - noted to be on propofol, patient is normotensive  - hold home anti hypertensives for now     #Chronic CHF   - patient noted to have hx of Chronic CHF  - last echo noted to show EF>70%  - will resume home medications    Renal  - no acute issues     Infectious disease  #Suspected PNA on CT chest    CT chest showing patchy opacities in right middle lobes and bilateral lobes   - continue with home nebulizers   - continue with ceftriaxone   - continue with solumedrol   - bcx and sputum cx NGTD 24h  - strep neg, mycoplama IgG pos IgM neg     Heme  - no acute issues    GI  - resume diet  - protonix     Endo     #HX of DM  - noted to be on lantus 30 units QHS  - on SSI  - q6hrs finger sticks  - increase lantus to 20  - incr premeal to 7 units      #Urinary retention   - patient noted to produce 2L of urine after mark was placed  - UA negative for infection   - TOV failed mark replaced 12/27    Skin  - no acute issues    Lines  - peripheral IV lines    Disposition   ICU Patient is a 64 yo male from [facility], active smoker, with a PMHx of CAD, CHF on nocturnal BIPAP, COPD ( MULTIPLE intubations in past on 3L of home O2), peripheral neuropathy, GERD, HTN, HLD, obesity, polyarthritis, Pulmonary HTN, TIA, Vit D Def, presents with hypoxemia from nursing facility and lethargy. Upon evaluation in ED, patient afebrile, BP of 126/58, tachycardic to 115bpm, hypoxic placed on 15L NRBM saturating 97%. ABG showing ph of 7.17, CO2 of 145. Patient noted to be lethargic and subsequently intubated. Physical examination noted for dry mucus membranes, mark in place immediately draining 2L clear urine. Lungs clear to auscultation. Labs significant for no leukocytosis, lactate 0.9, negative cardiac enzymes and UA negative for infection. CT head negative for any acute intracranial processes. CT chest showing patchy opacities in the right middle lobe and bilateral lower lobes with areas of consolidation suspicious for infectious etiology. EKG sinus tachycardia 115bpm with RBBB with no acute ischemic changes. Patient admitted to ICU for Acute on chronic hypoxic hypercapnic respiratory failure 2/2 Suspected COPD exacerbation.       # Acute encephalopathy   # Acute on chronic hypoxic hypercapnic respiratory failure 2/2 suspected COPD exacerbation   # Hx of COPD  # Hx of DM   # Chronic CHF   # Hx of CAD  # HTN  # HLD  # GERD   # Urinary retention     Neurological  # Acute encephalopathy  - patient noted to be lethargic upon arrival, intubated for airway protection  - noted to have C02 level of 145, CT head negative for any acute infarct, UA negative   - Acute encephalopathy likely 2/2 Acute on chronic CO2 retention   - continue with sedation with propofol   - bcx NGTG @24h  - post-intubation ABG improved, likely with CO2 chronically in the 80s  - mental status improved 12/26, extubated    Pulmonary  # Acute on chronic hypoxic hypercapnic respiratory failure   - noted to have hx of COPD on Nocturnal bipap 12/6  - patient intubated in ED, due to lethargy   - likely 2/2 CO2 retention  - CT chest showing patchy opacities in right middle lobes and bilateral lobes   - continue with home nebulizers   - continue with ceftriaxone   - continue with solumedrol   - extubated 12/26    #Hx of COPD  - patient noted to be on symbicort  - c/w nebs  - CT chest showing patchy opacities in right middle lobes and bilateral lobes   - post-intubation ABG improving  - continue with home nebulizers  - continue with ceftriaxone  - continue with solumedrol   - extubated 12/26    Cardiac  # HX of CAD   - patient noted to be on ASA at home   - resume home meds now that extubated   - troponin noted to be negative    #HTN   - patient noted to be on antihypertensives at home   - noted to be on propofol, patient is normotensive  - hold home anti hypertensives for now     #Chronic CHF   - patient noted to have hx of Chronic CHF  - last echo noted to show EF>70%  - will resume home medications    Renal  - no acute issues     Infectious disease  #Suspected PNA on CT chest    CT chest showing patchy opacities in right middle lobes and bilateral lobes   - continue with home nebulizers   - continue with ceftriaxone   - continue with solumedrol   - bcx and sputum cx NGTD 24h  - strep neg, mycoplama IgG pos IgM neg     Heme  - no acute issues    GI  - resume diet  - protonix     Endo     #HX of DM  - noted to be on lantus 30 units QHS  - on SSI  - q6hrs finger sticks  - increase lantus to 20  - incr premeal to 7 units      #Urinary retention   - patient noted to produce 2L of urine after mark was placed  - UA negative for infection   - TOV failed mark replaced 12/27    Skin  - no acute issues    Lines  - peripheral IV lines    Disposition   ICU

## 2023-12-27 NOTE — DIETITIAN INITIAL EVALUATION ADULT - PERTINENT LABORATORY DATA
12-27    131<L>  |  92<L>  |  21<H>  ----------------------------<  340<H>  3.9   |  35<H>  |  0.61    Ca    8.2<L>      27 Dec 2023 04:00  Phos  3.1     12-27  Mg     1.7     12-27    POCT Blood Glucose.: 330 mg/dL (12-27-23 @ 07:51)  A1C with Estimated Average Glucose Result: 11.2 % (10-06-23 @ 04:15)

## 2023-12-27 NOTE — CHART NOTE - NSCHARTNOTEFT_GEN_A_CORE
EVENT: Received telephone call from RN that pt is c/o of lower back pain which he rated as a 8/10    HPI: Patient is a 62 yo male from [facility], active smoker, with a PMHx of CAD, CHF on nocturnal BIPAP, COPD ( MULTIPLE intubations in past on 3L of home O2), peripheral neuropathy, GERD, HTN, HLD, obesity, polyarthritis, Pulmonary HTN, TIA, Vit D Def, presents with hypoxemia from nursing facility and lethargy. Upon evaluation in ED, patient afebrile, BP of 126/58, tachycardic to 115bpm, hypoxic placed on 15L NRBM saturating 97%. ABG showing ph of 7.17, CO2 of 145. Patient noted to be lethargic and subsequently intubated. Patient admitted to ICU for Acute on chronic hypoxic hypercapnic respiratory failure 2/2 Suspected COPD exacerbation.    SUBJECTIVE: "My back hurts. I need some Percocet"    OBJECTIVE:  Vital Signs Last 24 Hrs  T(C): 36.6 (27 Dec 2023 17:29), Max: 36.8 (26 Dec 2023 23:00)  T(F): 97.9 (27 Dec 2023 17:29), Max: 98.2 (26 Dec 2023 23:00)  HR: 99 (27 Dec 2023 20:06) (83 - 126)  BP: 126/69 (27 Dec 2023 20:00) (101/57 - 138/80)  BP(mean): 84 (27 Dec 2023 20:00) (69 - 104)  RR: 15 (27 Dec 2023 20:00) (14 - 36)  SpO2: 98% (27 Dec 2023 20:06) (90% - 100%)    Parameters below as of 27 Dec 2023 20:00  Patient On (Oxygen Delivery Method): nasal cannula  O2 Flow (L/min): 2    FOCUSED PHYSICAL EXAM:  Neuro: awake, alert, oriented x 3. No neuro deficit  Cardiovascular: Pulses +2 B/L in lower and upper extremities, HR regular, BP stable, No edema.  Respiratory: Respirations regular, unlabored, breath sounds clear B/L.   GI: Abdomen soft, non-tender, positive bowel sounds.  : no bladder distention noted. No complaints at this time.  Skin: Dry, intact, no bruising, no diaphoresis.    LABS:                        11.6   11.82 )-----------( 184      ( 27 Dec 2023 04:00 )             35.4     12-27    131<L>  |  92<L>  |  21<H>  ----------------------------<  340<H>  3.9   |  35<H>  |  0.61    Ca    8.2<L>      27 Dec 2023 04:00  Phos  3.1     12-27  Mg     1.7     12-27        EKG:   IMAGING:    ASSESSMENT/PROBLEM: Lower back pain      PLAN:   1. Ofirmev 1000 mg, IV x 1 dose ordered  2. Monitor response to treatment  3. Cont present care/treatment  4. Supportive care EVENT: Received telephone call from RN that pt is c/o of lower back pain which he rated as a 8/10    HPI: Patient is a 64 yo male from [facility], active smoker, with a PMHx of CAD, CHF on nocturnal BIPAP, COPD ( MULTIPLE intubations in past on 3L of home O2), peripheral neuropathy, GERD, HTN, HLD, obesity, polyarthritis, Pulmonary HTN, TIA, Vit D Def, presents with hypoxemia from nursing facility and lethargy. Upon evaluation in ED, patient afebrile, BP of 126/58, tachycardic to 115bpm, hypoxic placed on 15L NRBM saturating 97%. ABG showing ph of 7.17, CO2 of 145. Patient noted to be lethargic and subsequently intubated. Patient admitted to ICU for Acute on chronic hypoxic hypercapnic respiratory failure 2/2 Suspected COPD exacerbation.    SUBJECTIVE: "My back hurts. I need some Percocet"    OBJECTIVE:  Vital Signs Last 24 Hrs  T(C): 36.6 (27 Dec 2023 17:29), Max: 36.8 (26 Dec 2023 23:00)  T(F): 97.9 (27 Dec 2023 17:29), Max: 98.2 (26 Dec 2023 23:00)  HR: 99 (27 Dec 2023 20:06) (83 - 126)  BP: 126/69 (27 Dec 2023 20:00) (101/57 - 138/80)  BP(mean): 84 (27 Dec 2023 20:00) (69 - 104)  RR: 15 (27 Dec 2023 20:00) (14 - 36)  SpO2: 98% (27 Dec 2023 20:06) (90% - 100%)    Parameters below as of 27 Dec 2023 20:00  Patient On (Oxygen Delivery Method): nasal cannula  O2 Flow (L/min): 2    FOCUSED PHYSICAL EXAM:  Neuro: awake, alert, oriented x 3. No neuro deficit  Cardiovascular: Pulses +2 B/L in lower and upper extremities, HR regular, BP stable, No edema.  Respiratory: Respirations regular, unlabored, breath sounds clear B/L.   GI: Abdomen soft, non-tender, positive bowel sounds.  : no bladder distention noted. No complaints at this time.  Skin: Dry, intact, no bruising, no diaphoresis.    LABS:                        11.6   11.82 )-----------( 184      ( 27 Dec 2023 04:00 )             35.4     12-27    131<L>  |  92<L>  |  21<H>  ----------------------------<  340<H>  3.9   |  35<H>  |  0.61    Ca    8.2<L>      27 Dec 2023 04:00  Phos  3.1     12-27  Mg     1.7     12-27        EKG:   IMAGING:    ASSESSMENT/PROBLEM: Lower back pain      PLAN:   1. Ofirmev 1000 mg, IV x 1 dose ordered  2. Monitor response to treatment  3. Cont present care/treatment  4. Supportive care

## 2023-12-27 NOTE — PROGRESS NOTE ADULT - ATTENDING COMMENTS
63M with PMH as listed above including COPD (on home oxygen 3L), CAD, CHF presented to the ED with worsening SOB with respiratory distress.. In the ED he was found to be in copd exacerbation and in acute on chronic hypercapnic respiratory failure and he was intubated and placed on mechanical ventilation by the ED team. Soon after intubation he became hypotensive and was resuscitated with IV fluid boluses with NS to at least 4-5Liters.  At time of ICU evaluation, patient orally intubated  on mech vent and peripheral levophed on-going to maintain MAP >65. Patient is accepted to ICU for further management and closer monitoring.     Assessment  Acute on chronic hypercapnic respiratory failure from COPD exacerbation s/p intubation.  Acute exacerbation of COPD.  Hypovolemia from dehydrated state,  Right sided pneumonia  Shock- hypovolemic, cardiogenic   Hypovolemic hyponatremia, mild  COPD  HTN  CAD   CHF w Pulmonary HTN,    Plan  Extubated 12/26   Albuterol/Atrovent nebulizer q6hrs  Bipap at night and as needed, on home bipap support  IV Solu-medrol   IV Azithromycin for copd exacerbation x 3 doses  Cont. Ceftriaxone for 5 days for pneumonia   F/u cultures  Dysphagia screen and start oral diet  PT evaluation  OOB to chair  Void trial  Start BPH meds  DVT prophylaxis with Lovenox.  GI prophylaxis with protonix.  Transfer to SCU service
63M with PMH as listed above including COPD (on home oxygen 3L), CAD, CHF presented to the ED with worsening SOB with respiratory distress.. In the ED he was found to be in copd exacerbation and in acute on chronic hypercapnic respiratory failure and he was intubated and placed on mechanical ventilation by the ED team. Soon after intubation he became hypotensive and was resuscitated with IV fluid boluses with NS to at least 4-5Liters.  At time of ICU evaluation, patient orally intubated  on mech vent and peripheral levophed on-going to maintain MAP >65. Patient is accepted to ICU for further management and closer monitoring.     Assessment  Acute on chronic hypercapnic respiratory failure from COPD exacerbation s/p intubation.  Acute exacerbation of COPD.  Hypovolemia from dehydrated state,  Right sided pneumonia  Shock- hypovolemic, cardiogenic   Hypovolemic hyponatremia, mild  COPD  HTN  CAD   CHF w Pulmonary HTN,    Plan  Extubated this morning   Albuterol/Atrovent nebulizer q6hrs  Bipap at night and as needed, on home bipap support  IV Solu-medrol 40mg q6hrs  IV Azithromycin for copd exacerbation x 3 doses  Cont. Ceftriaxone for 5 days for pneumonia   F/u cultures  Dysphagia screen and start oral diet  PT evaluation  OOB to chair  Void trial  DVT prophylaxis with Lovenox.  GI prophylaxis with protonix.
63M with PMH as listed above including COPD (on home oxygen 3L), CAD, CHF presented to the ED with worsening SOB with respiratory distress.. In the ED he was found to be in copd exacerbation and in acute on chronic hypercapnic respiratory failure and he was intubated and placed on mechanical ventilation by the ED team. Soon after intubation he became hypotensive and was resuscitated with IV fluid boluses with NS to at least 4-5Liters.  At time of ICU evaluation, patient orally intubated  on mech vent and peripheral levophed on-going to maintain MAP >65. Patient is accepted to ICU for further management and closer monitoring.     Assessment  Acute on chronic hypercapnic respiratory failure from COPD exacerbation s/p intubation.  Acute exacerbation of COPD.  Hypovolemia from dehydrated state,  Shock- hypovolemic, cardiogenic   Hypovolemic hyponatremia, mild  H/o COPD  H/o HTN  H/o CAD   H/o CHF  H/o Pulmonary HTN,    Plan  Continue mechanical ventilation. Monitor ABG and adjust vent settings as needed.   Sedation for vent synchrony  Continue IVF hydration, taper off Levophed as tolerated and maintain MAP >65.   Albuterol/Atrovent nebulizer q2-4hrs for now  IV Solu-medrol 40mg q6hrs  IV Azithromycin for copd exacerbation.  Insert OG tube for feeding.   DVT prophylaxis with Lovenox.  GI prophylaxis with protonix.

## 2023-12-27 NOTE — DIETITIAN INITIAL EVALUATION ADULT - SIGNS/SYMPTOMS
finger stick boevj=722 to 330, Glucose=391->340, BnoB7R=06.2% 10/5/23, new level pending finger stick qavnd=751 to 330, Glucose=391->340, [id number]=23.2% 10/5/23, new level pending

## 2023-12-27 NOTE — DIETITIAN INITIAL EVALUATION ADULT - FACTORS AFF FOOD INTAKE
acute on chronic comorbidities including acute encephalopathy, AHRF, CHF, COPD, uncontrolled DM/Jainism/ethnic/cultural/personal food preferences acute on chronic comorbidities including acute encephalopathy, AHRF, CHF, COPD, uncontrolled DM/[place]/ethnic/cultural/personal food preferences

## 2023-12-28 ENCOUNTER — TRANSCRIPTION ENCOUNTER (OUTPATIENT)
Age: 63
End: 2023-12-28

## 2023-12-28 ENCOUNTER — APPOINTMENT (OUTPATIENT)
Dept: PULMONOLOGY | Facility: CLINIC | Age: 63
End: 2023-12-28

## 2023-12-28 DIAGNOSIS — J44.9 CHRONIC OBSTRUCTIVE PULMONARY DISEASE, UNSPECIFIED: ICD-10-CM

## 2023-12-28 DIAGNOSIS — E11.9 TYPE 2 DIABETES MELLITUS WITHOUT COMPLICATIONS: ICD-10-CM

## 2023-12-28 DIAGNOSIS — I10 ESSENTIAL (PRIMARY) HYPERTENSION: ICD-10-CM

## 2023-12-28 DIAGNOSIS — J96.21 ACUTE AND CHRONIC RESPIRATORY FAILURE WITH HYPOXIA: ICD-10-CM

## 2023-12-28 DIAGNOSIS — G93.41 METABOLIC ENCEPHALOPATHY: ICD-10-CM

## 2023-12-28 DIAGNOSIS — I27.20 PULMONARY HYPERTENSION, UNSPECIFIED: ICD-10-CM

## 2023-12-28 DIAGNOSIS — J15.1 PNEUMONIA DUE TO PSEUDOMONAS: ICD-10-CM

## 2023-12-28 DIAGNOSIS — Z71.89 OTHER SPECIFIED COUNSELING: ICD-10-CM

## 2023-12-28 LAB
-  AZTREONAM: SIGNIFICANT CHANGE UP
-  AZTREONAM: SIGNIFICANT CHANGE UP
-  CEFEPIME: SIGNIFICANT CHANGE UP
-  CEFEPIME: SIGNIFICANT CHANGE UP
-  CEFTAZIDIME: SIGNIFICANT CHANGE UP
-  CEFTAZIDIME: SIGNIFICANT CHANGE UP
-  CIPROFLOXACIN: SIGNIFICANT CHANGE UP
-  CIPROFLOXACIN: SIGNIFICANT CHANGE UP
-  IMIPENEM: SIGNIFICANT CHANGE UP
-  IMIPENEM: SIGNIFICANT CHANGE UP
-  LEVOFLOXACIN: SIGNIFICANT CHANGE UP
-  LEVOFLOXACIN: SIGNIFICANT CHANGE UP
-  MEROPENEM: SIGNIFICANT CHANGE UP
-  MEROPENEM: SIGNIFICANT CHANGE UP
-  PIPERACILLIN/TAZOBACTAM: SIGNIFICANT CHANGE UP
-  PIPERACILLIN/TAZOBACTAM: SIGNIFICANT CHANGE UP
ALBUMIN SERPL ELPH-MCNC: 2.6 G/DL — LOW (ref 3.5–5)
ALBUMIN SERPL ELPH-MCNC: 2.6 G/DL — LOW (ref 3.5–5)
ALP SERPL-CCNC: 44 U/L — SIGNIFICANT CHANGE UP (ref 40–120)
ALP SERPL-CCNC: 44 U/L — SIGNIFICANT CHANGE UP (ref 40–120)
ALT FLD-CCNC: 42 U/L DA — SIGNIFICANT CHANGE UP (ref 10–60)
ALT FLD-CCNC: 42 U/L DA — SIGNIFICANT CHANGE UP (ref 10–60)
ANION GAP SERPL CALC-SCNC: 4 MMOL/L — LOW (ref 5–17)
ANION GAP SERPL CALC-SCNC: 4 MMOL/L — LOW (ref 5–17)
AST SERPL-CCNC: 18 U/L — SIGNIFICANT CHANGE UP (ref 10–40)
AST SERPL-CCNC: 18 U/L — SIGNIFICANT CHANGE UP (ref 10–40)
BILIRUB SERPL-MCNC: 0.4 MG/DL — SIGNIFICANT CHANGE UP (ref 0.2–1.2)
BILIRUB SERPL-MCNC: 0.4 MG/DL — SIGNIFICANT CHANGE UP (ref 0.2–1.2)
BUN SERPL-MCNC: 17 MG/DL — SIGNIFICANT CHANGE UP (ref 7–18)
BUN SERPL-MCNC: 17 MG/DL — SIGNIFICANT CHANGE UP (ref 7–18)
CALCIUM SERPL-MCNC: 8.9 MG/DL — SIGNIFICANT CHANGE UP (ref 8.4–10.5)
CALCIUM SERPL-MCNC: 8.9 MG/DL — SIGNIFICANT CHANGE UP (ref 8.4–10.5)
CHLORIDE SERPL-SCNC: 98 MMOL/L — SIGNIFICANT CHANGE UP (ref 96–108)
CHLORIDE SERPL-SCNC: 98 MMOL/L — SIGNIFICANT CHANGE UP (ref 96–108)
CO2 SERPL-SCNC: 33 MMOL/L — HIGH (ref 22–31)
CO2 SERPL-SCNC: 33 MMOL/L — HIGH (ref 22–31)
CREAT SERPL-MCNC: 0.61 MG/DL — SIGNIFICANT CHANGE UP (ref 0.5–1.3)
CREAT SERPL-MCNC: 0.61 MG/DL — SIGNIFICANT CHANGE UP (ref 0.5–1.3)
CULTURE RESULTS: ABNORMAL
CULTURE RESULTS: ABNORMAL
EGFR: 109 ML/MIN/1.73M2 — SIGNIFICANT CHANGE UP
EGFR: 109 ML/MIN/1.73M2 — SIGNIFICANT CHANGE UP
GLUCOSE SERPL-MCNC: 243 MG/DL — HIGH (ref 70–99)
GLUCOSE SERPL-MCNC: 243 MG/DL — HIGH (ref 70–99)
HCT VFR BLD CALC: 35.3 % — LOW (ref 39–50)
HCT VFR BLD CALC: 35.3 % — LOW (ref 39–50)
HGB BLD-MCNC: 11.3 G/DL — LOW (ref 13–17)
HGB BLD-MCNC: 11.3 G/DL — LOW (ref 13–17)
MAGNESIUM SERPL-MCNC: 2 MG/DL — SIGNIFICANT CHANGE UP (ref 1.6–2.6)
MAGNESIUM SERPL-MCNC: 2 MG/DL — SIGNIFICANT CHANGE UP (ref 1.6–2.6)
MCHC RBC-ENTMCNC: 27.6 PG — SIGNIFICANT CHANGE UP (ref 27–34)
MCHC RBC-ENTMCNC: 27.6 PG — SIGNIFICANT CHANGE UP (ref 27–34)
MCHC RBC-ENTMCNC: 32 GM/DL — SIGNIFICANT CHANGE UP (ref 32–36)
MCHC RBC-ENTMCNC: 32 GM/DL — SIGNIFICANT CHANGE UP (ref 32–36)
MCV RBC AUTO: 86.3 FL — SIGNIFICANT CHANGE UP (ref 80–100)
MCV RBC AUTO: 86.3 FL — SIGNIFICANT CHANGE UP (ref 80–100)
METHOD TYPE: SIGNIFICANT CHANGE UP
METHOD TYPE: SIGNIFICANT CHANGE UP
NRBC # BLD: 0 /100 WBCS — SIGNIFICANT CHANGE UP (ref 0–0)
NRBC # BLD: 0 /100 WBCS — SIGNIFICANT CHANGE UP (ref 0–0)
ORGANISM # SPEC MICROSCOPIC CNT: ABNORMAL
PHOSPHATE SERPL-MCNC: 3.2 MG/DL — SIGNIFICANT CHANGE UP (ref 2.5–4.5)
PHOSPHATE SERPL-MCNC: 3.2 MG/DL — SIGNIFICANT CHANGE UP (ref 2.5–4.5)
PLATELET # BLD AUTO: 166 K/UL — SIGNIFICANT CHANGE UP (ref 150–400)
PLATELET # BLD AUTO: 166 K/UL — SIGNIFICANT CHANGE UP (ref 150–400)
POTASSIUM SERPL-MCNC: 3.5 MMOL/L — SIGNIFICANT CHANGE UP (ref 3.5–5.3)
POTASSIUM SERPL-MCNC: 3.5 MMOL/L — SIGNIFICANT CHANGE UP (ref 3.5–5.3)
POTASSIUM SERPL-SCNC: 3.5 MMOL/L — SIGNIFICANT CHANGE UP (ref 3.5–5.3)
POTASSIUM SERPL-SCNC: 3.5 MMOL/L — SIGNIFICANT CHANGE UP (ref 3.5–5.3)
PROT SERPL-MCNC: 5.6 G/DL — LOW (ref 6–8.3)
PROT SERPL-MCNC: 5.6 G/DL — LOW (ref 6–8.3)
RBC # BLD: 4.09 M/UL — LOW (ref 4.2–5.8)
RBC # BLD: 4.09 M/UL — LOW (ref 4.2–5.8)
RBC # FLD: 13.6 % — SIGNIFICANT CHANGE UP (ref 10.3–14.5)
RBC # FLD: 13.6 % — SIGNIFICANT CHANGE UP (ref 10.3–14.5)
SODIUM SERPL-SCNC: 135 MMOL/L — SIGNIFICANT CHANGE UP (ref 135–145)
SODIUM SERPL-SCNC: 135 MMOL/L — SIGNIFICANT CHANGE UP (ref 135–145)
SPECIMEN SOURCE: SIGNIFICANT CHANGE UP
SPECIMEN SOURCE: SIGNIFICANT CHANGE UP
WBC # BLD: 9.25 K/UL — SIGNIFICANT CHANGE UP (ref 3.8–10.5)
WBC # BLD: 9.25 K/UL — SIGNIFICANT CHANGE UP (ref 3.8–10.5)
WBC # FLD AUTO: 9.25 K/UL — SIGNIFICANT CHANGE UP (ref 3.8–10.5)
WBC # FLD AUTO: 9.25 K/UL — SIGNIFICANT CHANGE UP (ref 3.8–10.5)

## 2023-12-28 RX ORDER — LANOLIN ALCOHOL/MO/W.PET/CERES
3 CREAM (GRAM) TOPICAL ONCE
Refills: 0 | Status: COMPLETED | OUTPATIENT
Start: 2023-12-28 | End: 2023-12-28

## 2023-12-28 RX ORDER — PANTOPRAZOLE SODIUM 20 MG/1
40 TABLET, DELAYED RELEASE ORAL
Refills: 0 | Status: DISCONTINUED | OUTPATIENT
Start: 2023-12-29 | End: 2024-01-02

## 2023-12-28 RX ORDER — OXYCODONE AND ACETAMINOPHEN 5; 325 MG/1; MG/1
1 TABLET ORAL ONCE
Refills: 0 | Status: DISCONTINUED | OUTPATIENT
Start: 2023-12-28 | End: 2023-12-28

## 2023-12-28 RX ORDER — LANOLIN ALCOHOL/MO/W.PET/CERES
5 CREAM (GRAM) TOPICAL AT BEDTIME
Refills: 0 | Status: DISCONTINUED | OUTPATIENT
Start: 2023-12-28 | End: 2024-01-02

## 2023-12-28 RX ADMIN — FINASTERIDE 5 MILLIGRAM(S): 5 TABLET, FILM COATED ORAL at 12:16

## 2023-12-28 RX ADMIN — TAMSULOSIN HYDROCHLORIDE 0.4 MILLIGRAM(S): 0.4 CAPSULE ORAL at 22:02

## 2023-12-28 RX ADMIN — Medication 4: at 17:05

## 2023-12-28 RX ADMIN — Medication 7 UNIT(S): at 12:07

## 2023-12-28 RX ADMIN — Medication 3 MILLIGRAM(S): at 00:17

## 2023-12-28 RX ADMIN — GABAPENTIN 100 MILLIGRAM(S): 400 CAPSULE ORAL at 06:00

## 2023-12-28 RX ADMIN — OXYCODONE AND ACETAMINOPHEN 1 TABLET(S): 5; 325 TABLET ORAL at 16:31

## 2023-12-28 RX ADMIN — GABAPENTIN 100 MILLIGRAM(S): 400 CAPSULE ORAL at 22:02

## 2023-12-28 RX ADMIN — Medication 2: at 22:02

## 2023-12-28 RX ADMIN — Medication 7 UNIT(S): at 08:33

## 2023-12-28 RX ADMIN — Medication 10: at 12:07

## 2023-12-28 RX ADMIN — Medication 81 MILLIGRAM(S): at 12:16

## 2023-12-28 RX ADMIN — Medication 3 MILLILITER(S): at 08:38

## 2023-12-28 RX ADMIN — LIDOCAINE 1 PATCH: 4 CREAM TOPICAL at 02:00

## 2023-12-28 RX ADMIN — HEPARIN SODIUM 5000 UNIT(S): 5000 INJECTION INTRAVENOUS; SUBCUTANEOUS at 22:02

## 2023-12-28 RX ADMIN — CEFTRIAXONE 100 MILLIGRAM(S): 500 INJECTION, POWDER, FOR SOLUTION INTRAMUSCULAR; INTRAVENOUS at 12:16

## 2023-12-28 RX ADMIN — Medication 4: at 08:34

## 2023-12-28 RX ADMIN — Medication 5 MILLIGRAM(S): at 22:02

## 2023-12-28 RX ADMIN — Medication 40 MILLIGRAM(S): at 06:00

## 2023-12-28 RX ADMIN — LATANOPROST 1 DROP(S): 0.05 SOLUTION/ DROPS OPHTHALMIC; TOPICAL at 22:05

## 2023-12-28 RX ADMIN — INSULIN GLARGINE 20 UNIT(S): 100 INJECTION, SOLUTION SUBCUTANEOUS at 22:02

## 2023-12-28 RX ADMIN — Medication 7 UNIT(S): at 17:04

## 2023-12-28 RX ADMIN — OXYCODONE AND ACETAMINOPHEN 1 TABLET(S): 5; 325 TABLET ORAL at 17:30

## 2023-12-28 RX ADMIN — Medication 3 MILLILITER(S): at 15:18

## 2023-12-28 RX ADMIN — Medication 3 MILLILITER(S): at 20:15

## 2023-12-28 NOTE — PROGRESS NOTE ADULT - PROBLEM SELECTOR PLAN 5
Continue 20U lantus HS  7U lispro before meals.  Moderate sliding scale coverage.  A1c elevated 9.2 upon admission.

## 2023-12-28 NOTE — PROGRESS NOTE ADULT - SUBJECTIVE AND OBJECTIVE BOX
Time of Visit:  Patient seen and examined.     MEDICATIONS  (STANDING):  albuterol/ipratropium for Nebulization 3 milliLiter(s) Nebulizer every 6 hours  aspirin enteric coated 81 milliGRAM(s) Oral daily  budesonide 160 MICROgram(s)/formoterol 4.5 MICROgram(s) Inhaler 2 Puff(s) Inhalation two times a day  cefTRIAXone   IVPB 1000 milliGRAM(s) IV Intermittent every 24 hours  finasteride 5 milliGRAM(s) Oral daily  gabapentin 100 milliGRAM(s) Oral three times a day  glucagon  Injectable 1 milliGRAM(s) IntraMuscular once  heparin   Injectable 5000 Unit(s) SubCutaneous every 8 hours  insulin glargine Injectable (LANTUS) 20 Unit(s) SubCutaneous at bedtime  insulin lispro (ADMELOG) corrective regimen sliding scale   SubCutaneous three times a day before meals  insulin lispro (ADMELOG) corrective regimen sliding scale   SubCutaneous at bedtime  insulin lispro Injectable (ADMELOG) 7 Unit(s) SubCutaneous three times a day before meals  latanoprost 0.005% Ophthalmic Solution 1 Drop(s) Both EYES at bedtime  lidocaine   4% Patch 1 Patch Transdermal daily  melatonin 5 milliGRAM(s) Oral at bedtime  predniSONE   Tablet   Oral   predniSONE   Tablet 40 milliGRAM(s) Oral daily  tamsulosin 0.4 milliGRAM(s) Oral at bedtime      MEDICATIONS  (PRN):  oxycodone    5 mG/acetaminophen 325 mG 1 Tablet(s) Oral once PRN Severe Pain (7 - 10)       Medications up to date at time of exam.    ROS; No fever, chills, nasal congestion.      PHYSICAL EXAMINATION:    Vital Signs Last 24 Hrs  T(C): 36.9 (28 Dec 2023 13:27), Max: 36.9 (28 Dec 2023 13:27)  T(F): 98.4 (28 Dec 2023 13:27), Max: 98.4 (28 Dec 2023 13:27)  HR: 99 (28 Dec 2023 13:27) (87 - 126)  BP: 129/68 (28 Dec 2023 13:27) (110/72 - 138/80)  BP(mean): 84 (27 Dec 2023 20:00) (80 - 104)  RR: 18 (28 Dec 2023 13:27) (15 - 26)  SpO2: 98% (28 Dec 2023 13:27) (93% - 100%)    Parameters below as of 28 Dec 2023 13:27  Patient On (Oxygen Delivery Method): nasal cannula  O2 Flow (L/min): 3     (if applicable)    General: Alert and oriented. Able to answer question with no SOB. No acute distress .        HEENT: Head is normocephalic and atraumatic. No nasal tenderness . Extraocular muscles are intact. Mucous membranes are moist.     NECK: Supple, no palpable adenopathy.    LUNGS: Non labored. No wheezing. No use of accessory muscle.     HEART: S1 S2 Regular rate and no click / rub.     ABDOMEN: Soft, nontender, and nondistended.  Active bowel sounds.     EXTREMITIES: Without any cyanosis, clubbing, rash, lesions .    NEUROLOGIC: Awake, alert, oriented.     SKIN: Warm and moist . Non diaphoretic.       LABS:                        11.3   9.25  )-----------( 166      ( 28 Dec 2023 07:25 )             35.3     12-28    135  |  98  |  17  ----------------------------<  243<H>  3.5   |  33<H>  |  0.61    Ca    8.9      28 Dec 2023 07:25  Phos  3.2     12-28  Mg     2.0     12-28    TPro  5.6<L>  /  Alb  2.6<L>  /  TBili  0.4  /  DBili  x   /  AST  18  /  ALT  42  /  AlkPhos  44  12-28      Urinalysis Basic - ( 28 Dec 2023 07:25 )    Color: x / Appearance: x / SG: x / pH: x  Gluc: 243 mg/dL / Ketone: x  / Bili: x / Urobili: x   Blood: x / Protein: x / Nitrite: x   Leuk Esterase: x / RBC: x / WBC x   Sq Epi: x / Non Sq Epi: x / Bacteria: x      ABG - ( 27 Dec 2023 03:19 )  pH, Arterial: 7.49  pH, Blood: x     /  pCO2: 49    /  pO2: 78    / HCO3: 37    / Base Excess: 12.1  /  SaO2: 97          MICROBIOLOGY: (if applicable)    RADIOLOGY & ADDITIONAL STUDIES:  EKG:   CXR:  ECHO:    IMPRESSION: 63y Male PAST MEDICAL & SURGICAL HISTORY:  COPD (chronic obstructive pulmonary disease)  Oxygen 2-3L at home      Stented coronary artery  2017      HLD (hyperlipidemia)      Pulmonary HTN      Type 2 diabetes mellitus without complication, with long-term current use of insulin      DM (diabetes mellitus)      CAD (coronary artery disease)      GERD (gastroesophageal reflux disease)      Insomnia      CHF (congestive heart failure)      HTN (hypertension)      Mood disorder      No significant past surgical history      Impression; This is a 62 Y/O Male from [facility] who is an active smoker. Has XHF on Nocturnal AVAPS (Trilogy ) but non compliant. Was in ICU, treated for Pneumonia with Rocephin and Azithromycin. Had an episode that time with Lethargy and got intubated , was on Mechanical Ventilator x 1 DAY , extubated on 12-26-23. Admitted with Acute hypoxic hypercapnic respiratory failure secondary to COPD GOLD 4D. Sputum +ve for Pseudomonas Aeruginosa .     Suggestion:  O2 saturation 95% with O2 supplement . Continue Oxygen supplement 2L NC .   Reinforced the importance of smoking cessation and Daily compliance to PAP/NIPPV to prevent hospitalization and reintubation but patient refused to have it at Night .  On Ceftriaxone 1 Gm IVPB Daily.   Continue Duoneb via nebulization Q 6 Hours.  Continue Symbicort 160-4.5 mcg 2 puffs Twice daily.   DVT / GI prophylactic. On Heparin 5,000 Units SQ Q 8 Hours.    On Prednisone 40 mg Oral daily.   Monitor Blood glucose with insulin coverage .      Time of Visit:  Patient seen and examined.     MEDICATIONS  (STANDING):  albuterol/ipratropium for Nebulization 3 milliLiter(s) Nebulizer every 6 hours  aspirin enteric coated 81 milliGRAM(s) Oral daily  budesonide 160 MICROgram(s)/formoterol 4.5 MICROgram(s) Inhaler 2 Puff(s) Inhalation two times a day  cefTRIAXone   IVPB 1000 milliGRAM(s) IV Intermittent every 24 hours  finasteride 5 milliGRAM(s) Oral daily  gabapentin 100 milliGRAM(s) Oral three times a day  glucagon  Injectable 1 milliGRAM(s) IntraMuscular once  heparin   Injectable 5000 Unit(s) SubCutaneous every 8 hours  insulin glargine Injectable (LANTUS) 20 Unit(s) SubCutaneous at bedtime  insulin lispro (ADMELOG) corrective regimen sliding scale   SubCutaneous three times a day before meals  insulin lispro (ADMELOG) corrective regimen sliding scale   SubCutaneous at bedtime  insulin lispro Injectable (ADMELOG) 7 Unit(s) SubCutaneous three times a day before meals  latanoprost 0.005% Ophthalmic Solution 1 Drop(s) Both EYES at bedtime  lidocaine   4% Patch 1 Patch Transdermal daily  melatonin 5 milliGRAM(s) Oral at bedtime  predniSONE   Tablet   Oral   predniSONE   Tablet 40 milliGRAM(s) Oral daily  tamsulosin 0.4 milliGRAM(s) Oral at bedtime      MEDICATIONS  (PRN):  oxycodone    5 mG/acetaminophen 325 mG 1 Tablet(s) Oral once PRN Severe Pain (7 - 10)       Medications up to date at time of exam.    ROS; No fever, chills, nasal congestion.      PHYSICAL EXAMINATION:    Vital Signs Last 24 Hrs  T(C): 36.9 (28 Dec 2023 13:27), Max: 36.9 (28 Dec 2023 13:27)  T(F): 98.4 (28 Dec 2023 13:27), Max: 98.4 (28 Dec 2023 13:27)  HR: 99 (28 Dec 2023 13:27) (87 - 126)  BP: 129/68 (28 Dec 2023 13:27) (110/72 - 138/80)  BP(mean): 84 (27 Dec 2023 20:00) (80 - 104)  RR: 18 (28 Dec 2023 13:27) (15 - 26)  SpO2: 98% (28 Dec 2023 13:27) (93% - 100%)    Parameters below as of 28 Dec 2023 13:27  Patient On (Oxygen Delivery Method): nasal cannula  O2 Flow (L/min): 3     (if applicable)    General: Alert and oriented. Able to answer question with no SOB. No acute distress .        HEENT: Head is normocephalic and atraumatic. No nasal tenderness . Extraocular muscles are intact. Mucous membranes are moist.     NECK: Supple, no palpable adenopathy.    LUNGS: Non labored. No wheezing. No use of accessory muscle.     HEART: S1 S2 Regular rate and no click / rub.     ABDOMEN: Soft, nontender, and nondistended.  Active bowel sounds.     EXTREMITIES: Without any cyanosis, clubbing, rash, lesions .    NEUROLOGIC: Awake, alert, oriented.     SKIN: Warm and moist . Non diaphoretic.       LABS:                        11.3   9.25  )-----------( 166      ( 28 Dec 2023 07:25 )             35.3     12-28    135  |  98  |  17  ----------------------------<  243<H>  3.5   |  33<H>  |  0.61    Ca    8.9      28 Dec 2023 07:25  Phos  3.2     12-28  Mg     2.0     12-28    TPro  5.6<L>  /  Alb  2.6<L>  /  TBili  0.4  /  DBili  x   /  AST  18  /  ALT  42  /  AlkPhos  44  12-28      Urinalysis Basic - ( 28 Dec 2023 07:25 )    Color: x / Appearance: x / SG: x / pH: x  Gluc: 243 mg/dL / Ketone: x  / Bili: x / Urobili: x   Blood: x / Protein: x / Nitrite: x   Leuk Esterase: x / RBC: x / WBC x   Sq Epi: x / Non Sq Epi: x / Bacteria: x      ABG - ( 27 Dec 2023 03:19 )  pH, Arterial: 7.49  pH, Blood: x     /  pCO2: 49    /  pO2: 78    / HCO3: 37    / Base Excess: 12.1  /  SaO2: 97          MICROBIOLOGY: (if applicable)    RADIOLOGY & ADDITIONAL STUDIES:  EKG:   CXR:  ECHO:    IMPRESSION: 63y Male PAST MEDICAL & SURGICAL HISTORY:  COPD (chronic obstructive pulmonary disease)  Oxygen 2-3L at home      Stented coronary artery  2017      HLD (hyperlipidemia)      Pulmonary HTN      Type 2 diabetes mellitus without complication, with long-term current use of insulin      DM (diabetes mellitus)      CAD (coronary artery disease)      GERD (gastroesophageal reflux disease)      Insomnia      CHF (congestive heart failure)      HTN (hypertension)      Mood disorder      No significant past surgical history      Impression; This is a 62 Y/O Male from [facility] who is an active smoker. Has XHF on Nocturnal AVAPS (Trilogy ) but non compliant. Was in ICU, treated for Pneumonia with Rocephin and Azithromycin. Had an episode that time with Lethargy and got intubated , was on Mechanical Ventilator x 1 DAY , extubated on 12-26-23. Admitted with Acute hypoxic hypercapnic respiratory failure secondary to COPD GOLD 4D. Sputum +ve for Pseudomonas Aeruginosa .     Suggestion:  O2 saturation 95% with O2 supplement . Continue Oxygen supplement 2L NC .   Reinforced the importance of smoking cessation and Daily compliance to PAP/NIPPV to prevent hospitalization and reintubation but patient refused to have it at Night .  On Ceftriaxone 1 Gm IVPB Daily.   Continue Duoneb via nebulization Q 6 Hours.  Continue Symbicort 160-4.5 mcg 2 puffs Twice daily.   DVT / GI prophylactic. On Heparin 5,000 Units SQ Q 8 Hours.    On Prednisone 40 mg Oral daily.   Monitor Blood glucose with insulin coverage .

## 2023-12-28 NOTE — DISCHARGE NOTE PROVIDER - NSDCMRMEDTOKEN_GEN_ALL_CORE_FT
albuterol 2.5 mg/3 mL (0.083%) inhalation solution: 3 milliliter(s) by nebulizer 4 times a day  ALPRAZolam 0.5 mg oral tablet: 1 tab(s) orally 3 times a day  aspirin 81 mg oral capsule: 1 cap(s) orally once a day  finasteride 5 mg oral tablet: 1 tab(s) orally once a day  Flomax 0.4 mg oral capsule: 1 cap(s) orally once a day  gabapentin 100 mg oral capsule: 1 cap(s) orally 3 times a day  ipratropium 500 mcg/2.5 mL inhalation solution: 2.5 milliliter(s) by nebulizer 4 times a day  Iyuzeh 0.005% ophthalmic solution: to each affected eye once a day  Lipitor 40 mg oral tablet: 1 tab(s) orally once a day  nicotine 7 mg/24 hr transdermal film, extended release: 1 patch transdermally once a day  NovoLIN 70/30 FlexPen subcutaneous suspension: 34 unit(s) subcutaneous once a day (at bedtime)  NovoLIN 70/30 subcutaneous suspension: 38 unit(s) subcutaneous once a day at 7:30am  pantoprazole 40 mg oral delayed release tablet: 1 tab(s) orally once a day  polyethylene glycol 3350 oral kit: 1 orally once a day  senna (sennosides) 8.6 mg oral tablet: 2 tab(s) orally once a day (at bedtime)  Symbicort 80 mcg-4.5 mcg/inh inhalation aerosol: 2 puff(s) inhaled 2 times a day  traZODone 150 mg oral tablet: 1 tab(s) orally once a day   albuterol 2.5 mg/3 mL (0.083%) inhalation solution: 3 milliliter(s) by nebulizer 4 times a day  ALPRAZolam 0.5 mg oral tablet: 1 tab(s) orally 3 times a day  aspirin 81 mg oral capsule: 1 cap(s) orally once a day  finasteride 5 mg oral tablet: 1 tab(s) orally once a day  Flomax 0.4 mg oral capsule: 1 cap(s) orally once a day  gabapentin 100 mg oral capsule: 1 cap(s) orally 3 times a day  ipratropium 500 mcg/2.5 mL inhalation solution: 2.5 milliliter(s) by nebulizer 4 times a day  Iyuzeh 0.005% ophthalmic solution: to each affected eye once a day  levoFLOXacin 500 mg oral tablet: 1 tab(s) orally every 24 hours  Lipitor 40 mg oral tablet: 1 tab(s) orally once a day  nicotine 7 mg/24 hr transdermal film, extended release: 1 patch transdermally once a day  NovoLIN 70/30 FlexPen subcutaneous suspension: 34 unit(s) subcutaneous once a day (at bedtime)  NovoLIN 70/30 subcutaneous suspension: 38 unit(s) subcutaneous once a day at 7:30am  pantoprazole 40 mg oral delayed release tablet: 1 tab(s) orally once a day  polyethylene glycol 3350 oral kit: 1 orally once a day  senna (sennosides) 8.6 mg oral tablet: 2 tab(s) orally once a day (at bedtime)  Symbicort 80 mcg-4.5 mcg/inh inhalation aerosol: 2 puff(s) inhaled 2 times a day  traZODone 150 mg oral tablet: 1 tab(s) orally once a day

## 2023-12-28 NOTE — PROGRESS NOTE ADULT - COVID-19 NEGATIVE LAB RESULT
PHYSICIAN NEXT STEPS:   Review Only      CHIEF COMPLAINT:   Chief Complaint/Protocol Used: Patient Declines Triage   Onset: N/A         ASSESSMENT:   Â» Would have sought care in the doctor's office True   -------------------------------------------------------      DISPOSITION:   Disposition Recommendation: Unspecified   Patient Directed To: Unspecified   Patient Intended Action: Talk with my doctor         CALL NOTES:   2017 at 2:57 PM by Mayra GUERRERO» Mother declined triage; life threats cleared. She stated the infant is vomiting and want to bring the infant in to physician's office immediately. Mother insisting to speak to the physician's office. Connected the mother to Tamar in [place] office.      DISPOSITION OVERRIDE/PROVIDER CONSULT:   Disposition Override: N/A   Override Source: Unspecified   Consulted with PCP: No   Consulted with On-Call Physician: No         CALLER CONTACT INFO:   Phone 1: [phone] - Preferred         ENCOUNTER STARTED:   11/08/17 02:40:18 PM   ENCOUNTER ASSIGNED TO/CLOSED BY:   Erick Curtis @ 11/09/17 09:54:09 PM         -------------------------------------------------------      UNDERSTANDS CARE ADVICE: Yes      AGREES WITH CARE ADVICE: No      WILL FOLLOW CARE ADVICE: No      -------------------------------------------------------  
COVID-19 ruled out

## 2023-12-28 NOTE — PROGRESS NOTE ADULT - ASSESSMENT
64 yo male from [facility], active smoker, with a PMH of CAD, CHF on nocturnal AVAPS-AE (Trilogy), COPD ( MULTIPLE intubations in past on 3L of home O2), peripheral neuropathy, GERD, HTN, HLD, obesity, polyarthritis, Pulmonary HTN, TIA, Vit D Def, presents with hypoxemia from nursing facility and lethargy. Upon evaluation in ED, patient afebrile, BP of 126/58, tachycardic to 115bpm, hypoxic placed on 15L NRBM saturating 97%. ABG showing ph of 7.17, CO2 of 145. Patient noted to be lethargic and subsequently intubated. Physical examination noted for dry mucus membranes, Tejada in place immediately draining 2L clear urine. Lungs clear to auscultation. Labs significant for no leukocytosis, lactate 0.9, negative cardiac enzymes and UA negative for infection. CT head negative for any acute intracranial processes. CT chest showing patchy opacities in the right middle lobe and bilateral lower lobes with areas of consolidation suspicious for infectious etiology. EKG sinus tachycardia 115bpm with RBBB with no acute ischemic changes. Patient admitted to ICU for Acute on chronic hypoxic hypercapnic respiratory failure 2/2 Suspected COPD exacerbation.   <ICU course>  Patient when admitted to ICU, treated for PNA with Rocephin and azithromycin. Patient remained on mechanical ventilator for 1 day. Hypercapnia resolved and the next day 12/26 patient was extubated. Tejada removed and patient was found to be retaining urine straight cath was done resulting in >700 cc of urine. Patient found to be retaining and Tejada was replaced. Blood cultures Blood and urine cultures are negative to date. Sputum cultures noted to be growing pseudomonas. Legionella negative. Patient completed azithromycin and was started on prednisone taper.  PT recommended home PT. Patient refusing nocturnal BiPAP.    62 yo male from [facility], active smoker, with a PMH of CAD, CHF on nocturnal AVAPS-AE (Trilogy), COPD ( MULTIPLE intubations in past on 3L of home O2), peripheral neuropathy, GERD, HTN, HLD, obesity, polyarthritis, Pulmonary HTN, TIA, Vit D Def, presents with hypoxemia from nursing facility and lethargy. Upon evaluation in ED, patient afebrile, BP of 126/58, tachycardic to 115bpm, hypoxic placed on 15L NRBM saturating 97%. ABG showing ph of 7.17, CO2 of 145. Patient noted to be lethargic and subsequently intubated. Physical examination noted for dry mucus membranes, Tejada in place immediately draining 2L clear urine. Lungs clear to auscultation. Labs significant for no leukocytosis, lactate 0.9, negative cardiac enzymes and UA negative for infection. CT head negative for any acute intracranial processes. CT chest showing patchy opacities in the right middle lobe and bilateral lower lobes with areas of consolidation suspicious for infectious etiology. EKG sinus tachycardia 115bpm with RBBB with no acute ischemic changes. Patient admitted to ICU for Acute on chronic hypoxic hypercapnic respiratory failure 2/2 Suspected COPD exacerbation.   <ICU course>  Patient when admitted to ICU, treated for PNA with Rocephin and azithromycin. Patient remained on mechanical ventilator for 1 day. Hypercapnia resolved and the next day 12/26 patient was extubated. Tejada removed and patient was found to be retaining urine straight cath was done resulting in >700 cc of urine. Patient found to be retaining and Tejada was replaced. Blood cultures Blood and urine cultures are negative to date. Sputum cultures noted to be growing pseudomonas. Legionella negative. Patient completed azithromycin and was started on prednisone taper.  PT recommended home PT. Patient refusing nocturnal BiPAP.

## 2023-12-28 NOTE — DISCHARGE NOTE PROVIDER - HOSPITAL COURSE
64 yo male from [facility], active smoker, with a PMH of CAD, CHF on nocturnal AVAPS-AE ([facility]), COPD ( MULTIPLE intubations in past on 3L of home O2), peripheral neuropathy, GERD, HTN, HLD, obesity, polyarthritis, Pulmonary HTN, TIA, Vit D Def, presents with hypoxemia from nursing facility and lethargy. Upon evaluation in ED, patient afebrile, BP of 126/58, tachycardic to 115bpm, hypoxic placed on 15L NRBM saturating 97%. ABG showing ph of 7.17, CO2 of 145. Patient noted to be lethargic and subsequently intubated. Physical examination noted for dry mucus membranes, Tejada in place immediately draining 2L clear urine. Lungs clear to auscultation. Labs significant for no leukocytosis, lactate 0.9, negative cardiac enzymes and UA negative for infection. CT head negative for any acute intracranial processes. CT chest showing patchy opacities in the right middle lobe and bilateral lower lobes with areas of consolidation suspicious for infectious etiology. EKG sinus tachycardia 115bpm with RBBB with no acute ischemic changes. Patient admitted to ICU for Acute on chronic hypoxic hypercapnic respiratory failure 2/2 Suspected COPD exacerbation.   <ICU course>  Patient when admitted to ICU, treated for PNA with Rocephin and azithromycin. Patient remained on mechanical ventilator for 1 day. Hypercapnia resolved and the next day 12/26 patient was extubated. Tejada removed and patient was found to be retaining urine straight cath was done resulting in >700 cc of urine. Patient found to be retaining and Tejada was replaced. Blood cultures Blood and urine cultures are negative to date. Sputum cultures noted to be growing pseudomonas. Legionella negative. Patient completed azithromycin and was started on prednisone taper.  PT recommended home PT. Patient refusing nocturnal BiPAP. 62 yo male from [facility], active smoker, with a PMH of CAD, CHF on nocturnal AVAPS-AE ([facility]), COPD ( MULTIPLE intubations in past on 3L of home O2), peripheral neuropathy, GERD, HTN, HLD, obesity, polyarthritis, Pulmonary HTN, TIA, Vit D Def, presents with hypoxemia from nursing facility and lethargy. Upon evaluation in ED, patient afebrile, BP of 126/58, tachycardic to 115bpm, hypoxic placed on 15L NRBM saturating 97%. ABG showing ph of 7.17, CO2 of 145. Patient noted to be lethargic and subsequently intubated. Physical examination noted for dry mucus membranes, Tejada in place immediately draining 2L clear urine. Lungs clear to auscultation. Labs significant for no leukocytosis, lactate 0.9, negative cardiac enzymes and UA negative for infection. CT head negative for any acute intracranial processes. CT chest showing patchy opacities in the right middle lobe and bilateral lower lobes with areas of consolidation suspicious for infectious etiology. EKG sinus tachycardia 115bpm with RBBB with no acute ischemic changes. Patient admitted to ICU for Acute on chronic hypoxic hypercapnic respiratory failure 2/2 Suspected COPD exacerbation.   <ICU course>  Patient when admitted to ICU, treated for PNA with Rocephin and azithromycin. Patient remained on mechanical ventilator for 1 day. Hypercapnia resolved and the next day 12/26 patient was extubated. Tejada removed and patient was found to be retaining urine straight cath was done resulting in >700 cc of urine. Patient found to be retaining and Tejada was replaced. Blood cultures Blood and urine cultures are negative to date. Sputum cultures noted to be growing pseudomonas. Legionella negative. Patient completed azithromycin and was started on prednisone taper.  PT recommended home PT. Patient refusing nocturnal BiPAP. 62 yo male from [facility], active smoker, with a PMH of CAD, CHF on nocturnal AVAPS-AE ([facility]), COPD ( MULTIPLE intubations in past on 3L of home O2), peripheral neuropathy, GERD, HTN, HLD, obesity, polyarthritis, Pulmonary HTN, TIA, Vit D Def, presents with hypoxemia from nursing facility and lethargy. Upon evaluation in ED, patient afebrile, BP of 126/58, tachycardic to 115bpm, hypoxic placed on 15L NRBM saturating 97%. ABG showing ph of 7.17, CO2 of 145. Patient noted to be lethargic and subsequently intubated. Physical examination noted for dry mucus membranes, Tejada in place immediately draining 2L clear urine. Lungs clear to auscultation. Labs significant for no leukocytosis, lactate 0.9, negative cardiac enzymes and UA negative for infection. CT head negative for any acute intracranial processes. CT chest showing patchy opacities in the right middle lobe and bilateral lower lobes with areas of consolidation suspicious for infectious etiology. EKG sinus tachycardia 115bpm with RBBB with no acute ischemic changes. Patient admitted to ICU for Acute on chronic hypoxic hypercapnic respiratory failure 2/2 Suspected COPD exacerbation.   <ICU course>  Patient when admitted to ICU, treated for PNA with Rocephin and azithromycin. Patient remained on mechanical ventilator for 1 day. Hypercapnia resolved and the next day 12/26 patient was extubated. Tejada removed and patient was found to be retaining urine straight cath was done resulting in >700 cc of urine. Patient found to be retaining and Tejada was replaced. Passed TOV. Blood cultures Blood and urine cultures are negative to date. Sputum cultures noted to be growing pseudomonas. Legionella negative. Patient completed azithromycin and was started on prednisone taper.  PT recommended home PT. Patient refusing nocturnal BiPAP. Patient teated with Levaquin.    Please note that this a brief summary of hospital course please refer to daily progress notes and consult notes for full course and events. Patient seen and examined at bedside, discussed with medical attending. Patient medically cleared for discharge to assisted living. 64 yo male from [facility], active smoker, with a PMH of CAD, CHF on nocturnal AVAPS-AE ([facility]), COPD ( MULTIPLE intubations in past on 3L of home O2), peripheral neuropathy, GERD, HTN, HLD, obesity, polyarthritis, Pulmonary HTN, TIA, Vit D Def, presents with hypoxemia from nursing facility and lethargy. Upon evaluation in ED, patient afebrile, BP of 126/58, tachycardic to 115bpm, hypoxic placed on 15L NRBM saturating 97%. ABG showing ph of 7.17, CO2 of 145. Patient noted to be lethargic and subsequently intubated. Physical examination noted for dry mucus membranes, Tejada in place immediately draining 2L clear urine. Lungs clear to auscultation. Labs significant for no leukocytosis, lactate 0.9, negative cardiac enzymes and UA negative for infection. CT head negative for any acute intracranial processes. CT chest showing patchy opacities in the right middle lobe and bilateral lower lobes with areas of consolidation suspicious for infectious etiology. EKG sinus tachycardia 115bpm with RBBB with no acute ischemic changes. Patient admitted to ICU for Acute on chronic hypoxic hypercapnic respiratory failure 2/2 Suspected COPD exacerbation.   <ICU course>  Patient when admitted to ICU, treated for PNA with Rocephin and azithromycin. Patient remained on mechanical ventilator for 1 day. Hypercapnia resolved and the next day 12/26 patient was extubated. Tejada removed and patient was found to be retaining urine straight cath was done resulting in >700 cc of urine. Patient found to be retaining and Tejada was replaced. Passed TOV. Blood cultures Blood and urine cultures are negative to date. Sputum cultures noted to be growing pseudomonas. Legionella negative. Patient completed azithromycin and was started on prednisone taper.  PT recommended home PT. Patient refusing nocturnal BiPAP. Patient teated with Levaquin.    Please note that this a brief summary of hospital course please refer to daily progress notes and consult notes for full course and events. Patient seen and examined at bedside, discussed with medical attending. Patient medically cleared for discharge to assisted living. 64 yo male from [facility], active smoker, with a PMH of CAD, CHF on nocturnal AVAPS-AE ([facility]), COPD ( MULTIPLE intubations in past on 3L of home O2), peripheral neuropathy, GERD, HTN, HLD, obesity, polyarthritis, Pulmonary HTN, TIA, Vit D Def, presents with hypoxemia from nursing facility and lethargy. Upon evaluation in ED, patient afebrile, BP of 126/58, tachycardic to 115bpm, hypoxic placed on 15L NRBM saturating 97%. ABG showing ph of 7.17, CO2 of 145. Patient noted to be lethargic and subsequently intubated. Physical examination noted for dry mucus membranes, Tejada in place immediately draining 2L clear urine. Lungs clear to auscultation. Labs significant for no leukocytosis, lactate 0.9, negative cardiac enzymes and UA negative for infection. CT head negative for any acute intracranial processes. CT chest showing patchy opacities in the right middle lobe and bilateral lower lobes with areas of consolidation suspicious for infectious etiology. EKG sinus tachycardia 115bpm with RBBB with no acute ischemic changes. Patient admitted to ICU for Acute on chronic hypoxic hypercapnic respiratory failure 2/2 Suspected COPD exacerbation.   <ICU course>  Patient when admitted to ICU, treated for PNA with Rocephin and azithromycin. Patient remained on mechanical ventilator for 1 day. Hypercapnia resolved and the next day 12/26 patient was extubated. Tejada removed and patient was found to be retaining urine straight cath was done resulting in >700 cc of urine. Patient found to be retaining and Tejada was replaced. Passed TOV. Blood cultures Blood and urine cultures are negative to date. Sputum cultures noted to be growing pseudomonas. Legionella negative. Patient completed azithromycin and was started on prednisone taper.  PT recommended home PT. Patient refusing nocturnal BiPAP. Patient teated with Levaquin.    Please note that this a brief summary of hospital course please refer to daily progress notes and consult notes for full course and events. Patient seen and examined at bedside, discussed with medical attending. Patient medically cleared for discharge to assisted living.     for a full account of hospital course please refer to actual medical records for this is a brief summery 62 yo male from [facility], active smoker, with a PMH of CAD, CHF on nocturnal AVAPS-AE ([facility]), COPD ( MULTIPLE intubations in past on 3L of home O2), peripheral neuropathy, GERD, HTN, HLD, obesity, polyarthritis, Pulmonary HTN, TIA, Vit D Def, presents with hypoxemia from nursing facility and lethargy. Upon evaluation in ED, patient afebrile, BP of 126/58, tachycardic to 115bpm, hypoxic placed on 15L NRBM saturating 97%. ABG showing ph of 7.17, CO2 of 145. Patient noted to be lethargic and subsequently intubated. Physical examination noted for dry mucus membranes, Tejada in place immediately draining 2L clear urine. Lungs clear to auscultation. Labs significant for no leukocytosis, lactate 0.9, negative cardiac enzymes and UA negative for infection. CT head negative for any acute intracranial processes. CT chest showing patchy opacities in the right middle lobe and bilateral lower lobes with areas of consolidation suspicious for infectious etiology. EKG sinus tachycardia 115bpm with RBBB with no acute ischemic changes. Patient admitted to ICU for Acute on chronic hypoxic hypercapnic respiratory failure 2/2 Suspected COPD exacerbation.   <ICU course>  Patient when admitted to ICU, treated for PNA with Rocephin and azithromycin. Patient remained on mechanical ventilator for 1 day. Hypercapnia resolved and the next day 12/26 patient was extubated. Tejada removed and patient was found to be retaining urine straight cath was done resulting in >700 cc of urine. Patient found to be retaining and Tejada was replaced. Passed TOV. Blood cultures Blood and urine cultures are negative to date. Sputum cultures noted to be growing pseudomonas. Legionella negative. Patient completed azithromycin and was started on prednisone taper.  PT recommended home PT. Patient refusing nocturnal BiPAP. Patient teated with Levaquin.    Please note that this a brief summary of hospital course please refer to daily progress notes and consult notes for full course and events. Patient seen and examined at bedside, discussed with medical attending. Patient medically cleared for discharge to assisted living.     for a full account of hospital course please refer to actual medical records for this is a brief summery

## 2023-12-28 NOTE — PROGRESS NOTE ADULT - SUBJECTIVE AND OBJECTIVE BOX
DEVIKA CARBALLO    SCU progress note    INTERVAL HPI/OVERNIGHT EVENTS: ***transferred from ICU to SCU overnight.  HPI: 64 yo male from [facility], active smoker, with a PMH of CAD, CHF on nocturnal AVAPS-AE (Trilogy), COPD ( MULTIPLE intubations in past on 3L of home O2), peripheral neuropathy, GERD, HTN, HLD, obesity, polyarthritis, Pulmonary HTN, TIA, Vit D Def, presents with hypoxemia from nursing facility and lethargy. Upon evaluation in ED, patient afebrile, BP of 126/58, tachycardic to 115bpm, hypoxic placed on 15L NRBM saturating 97%. ABG showing ph of 7.17, CO2 of 145. Patient noted to be lethargic and subsequently intubated. Physical examination noted for dry mucus membranes, Tejada in place immediately draining 2L clear urine. Lungs clear to auscultation. Labs significant for no leukocytosis, lactate 0.9, negative cardiac enzymes and UA negative for infection. CT head negative for any acute intracranial processes. CT chest showing patchy opacities in the right middle lobe and bilateral lower lobes with areas of consolidation suspicious for infectious etiology. EKG sinus tachycardia 115bpm with RBBB with no acute ischemic changes. Patient admitted to ICU for Acute on chronic hypoxic hypercapnic respiratory failure 2/2 Suspected COPD exacerbation.   <ICU course>  Patient when admitted to ICU, treated for PNA with Rocephin and azithromycin. Patient remained on mechanical ventilator for 1 day. Hypercapnia resolved and the next day 12/26 patient was extubated. Tejada removed and patient was found to be retaining urine straight cath was done resulting in >700 cc of urine. Patient found to be retaining and Tejada was replaced. Blood cultures Blood and urine cultures are negative to date. Sputum cultures noted to be growing pseudomonas. Legionella negative. Patient completed azithromycin and was started on prednisone taper.  PT recommended home PT. Patient refusing nocturnal BiPAP.       DNR [ ]   DNI  [  ]   Full code    Covid - 19 PCR:  negative 12/24    The 4Ms    What Matters Most: see GOC  Age appropriate Medications/Screen for High Risk Medication: Yes  Mentation: see CAM below  Mobility: defer to physical exam    The Confusion Assessment Method (CAM) Diagnostic Algorithm     1: Acute Onset or Fluctuating Course  - Is there evidence of an acute change in mental status from the patient’s baseline? Did the (abnormal) behavior  fluctuate during the day, that is, tend to come and go, or increase and decrease in severity?  [ ] YES [x ] NO     2: Inattention  - Did the patient have difficulty focusing attention, being easily distractible, or having difficulty keeping track of what was being said?  [ ] YES [x ] NO     3: Disorganized thinking  -Was the patient’s thinking disorganized or incoherent, such as rambling or irrelevant conversation, unclear or illogical flow of ideas, or unpredictable switching from subject to subject?  [ ] YES [x ] NO    4: Altered Level of consciousness?  [ ] YES [x ] NO    The diagnosis of delirium by CAM requires the presence of features 1 and 2 and either 3 or 4.    PRESSORS: [ ] YES [ x] NO  cefTRIAXone   IVPB 1000 milliGRAM(s) IV Intermittent every 24 hours    Cardiovascular:  Heart Failure  Acute   Acute on Chronic  Chronic         Pulmonary:  albuterol/ipratropium for Nebulization 3 milliLiter(s) Nebulizer every 6 hours  budesonide 160 MICROgram(s)/formoterol 4.5 MICROgram(s) Inhaler 2 Puff(s) Inhalation two times a day    Hematalogic:  aspirin enteric coated 81 milliGRAM(s) Oral daily  heparin   Injectable 5000 Unit(s) SubCutaneous every 8 hours    Other:  finasteride 5 milliGRAM(s) Oral daily  gabapentin 100 milliGRAM(s) Oral three times a day  glucagon  Injectable 1 milliGRAM(s) IntraMuscular once  insulin glargine Injectable (LANTUS) 20 Unit(s) SubCutaneous at bedtime  insulin lispro (ADMELOG) corrective regimen sliding scale   SubCutaneous three times a day before meals  insulin lispro (ADMELOG) corrective regimen sliding scale   SubCutaneous at bedtime  insulin lispro Injectable (ADMELOG) 7 Unit(s) SubCutaneous three times a day before meals  latanoprost 0.005% Ophthalmic Solution 1 Drop(s) Both EYES at bedtime  lidocaine   4% Patch 1 Patch Transdermal daily  pantoprazole  Injectable 40 milliGRAM(s) IV Push daily  predniSONE   Tablet   Oral   predniSONE   Tablet 40 milliGRAM(s) Oral daily  tamsulosin 0.4 milliGRAM(s) Oral at bedtime  traZODone 150 milliGRAM(s) Oral daily    albuterol/ipratropium for Nebulization 3 milliLiter(s) Nebulizer every 6 hours  aspirin enteric coated 81 milliGRAM(s) Oral daily  budesonide 160 MICROgram(s)/formoterol 4.5 MICROgram(s) Inhaler 2 Puff(s) Inhalation two times a day  cefTRIAXone   IVPB 1000 milliGRAM(s) IV Intermittent every 24 hours  finasteride 5 milliGRAM(s) Oral daily  gabapentin 100 milliGRAM(s) Oral three times a day  glucagon  Injectable 1 milliGRAM(s) IntraMuscular once  heparin   Injectable 5000 Unit(s) SubCutaneous every 8 hours  insulin glargine Injectable (LANTUS) 20 Unit(s) SubCutaneous at bedtime  insulin lispro (ADMELOG) corrective regimen sliding scale   SubCutaneous three times a day before meals  insulin lispro (ADMELOG) corrective regimen sliding scale   SubCutaneous at bedtime  insulin lispro Injectable (ADMELOG) 7 Unit(s) SubCutaneous three times a day before meals  latanoprost 0.005% Ophthalmic Solution 1 Drop(s) Both EYES at bedtime  lidocaine   4% Patch 1 Patch Transdermal daily  pantoprazole  Injectable 40 milliGRAM(s) IV Push daily  predniSONE   Tablet   Oral   predniSONE   Tablet 40 milliGRAM(s) Oral daily  tamsulosin 0.4 milliGRAM(s) Oral at bedtime  traZODone 150 milliGRAM(s) Oral daily    Drug Dosing Weight  Height (cm): 185.4 (06 Oct 2023 18:01)  Weight (kg): 88.7 (27 Dec 2023 07:57)  BMI (kg/m2): 25.8 (27 Dec 2023 07:57)  BSA (m2): 2.13 (27 Dec 2023 07:57)    CENTRAL LINE: [ ] YES [x ] NO  LOCATION:   DATE INSERTED:  REMOVE: [ ] YES [ ] NO  EXPLAIN:    MT: [x ] YES [ ] NO    DATE INSERTED:  REMOVE:  [ ] YES [x ] NO  EXPLAIN:  Failed TOV    PAST MEDICAL & SURGICAL HISTORY:  COPD (chronic obstructive pulmonary disease)  Oxygen 2-3L at home      Stented coronary artery  2017      HLD (hyperlipidemia)      Pulmonary HTN      Type 2 diabetes mellitus without complication, with long-term current use of insulin      DM (diabetes mellitus)      CAD (coronary artery disease)      GERD (gastroesophageal reflux disease)      Insomnia      CHF (congestive heart failure)      HTN (hypertension)      Mood disorder      No significant past surgical history            ABG - ( 27 Dec 2023 03:19 )  pH, Arterial: 7.49  pH, Blood: x     /  pCO2: 49    /  pO2: 78    / HCO3: 37    / Base Excess: 12.1  /  SaO2: 97                    12-27 @ 07:01  -  12-28 @ 07:00  --------------------------------------------------------  IN: 1010 mL / OUT: 4375 mL / NET: -3365 mL            PHYSICAL EXAM:    GENERAL: NAD, obese male  HEAD:  Atraumatic, Normocephalic  EYES: EOMI, PERRLA, conjunctiva and sclera clear  ENMT: No tonsillar erythema, exudates  NECK: Supple, No JVD  NERVOUS SYSTEM:  Alert & Oriented X3, Follows commands, moving all extremities.  CHEST/LUNG: Diminished breath sounds bilateral bases.  HEART: Regular rate and rhythm; No murmurs, rubs, or gallops  ABDOMEN: Soft, Obese,  Nontender, Nondistended; Bowel sounds present  EXTREMITIES:  2+ Peripheral Pulses, No clubbing, cyanosis, or edema  LYMPH: No lymphadenopathy noted  SKIN: No rashes or lesions      LABS:  CBC Full  -  ( 27 Dec 2023 04:00 )  WBC Count : 11.82 K/uL  RBC Count : 4.18 M/uL  Hemoglobin : 11.6 g/dL  Hematocrit : 35.4 %  Platelet Count - Automated : 184 K/uL  Mean Cell Volume : 84.7 fl  Mean Cell Hemoglobin : 27.8 pg  Mean Cell Hemoglobin Concentration : 32.8 gm/dL  Auto Neutrophil # : 10.19 K/uL  Auto Lymphocyte # : 0.83 K/uL  Auto Monocyte # : 0.62 K/uL  Auto Eosinophil # : 0.03 K/uL  Auto Basophil # : 0.02 K/uL  Auto Neutrophil % : 86.2 %  Auto Lymphocyte % : 7.0 %  Auto Monocyte % : 5.2 %  Auto Eosinophil % : 0.3 %  Auto Basophil % : 0.2 %    12-27    131<L>  |  92<L>  |  21<H>  ----------------------------<  340<H>  3.9   |  35<H>  |  0.61    Ca    8.2<L>      27 Dec 2023 04:00  Phos  3.1     12-27  Mg     1.7     12-27        Urinalysis Basic - ( 27 Dec 2023 04:00 )    Color: x / Appearance: x / SG: x / pH: x  Gluc: 340 mg/dL / Ketone: x  / Bili: x / Urobili: x   Blood: x / Protein: x / Nitrite: x   Leuk Esterase: x / RBC: x / WBC x   Sq Epi: x / Non Sq Epi: x / Bacteria: x            [  ]  DVT Prophylaxis  [  ]  Nutrition, Brand, Rate         Goal Rate        Abnormal Nutritional Status -  Malnutrition   Cachexia      Morbid Obesity BMI >/=40    RADIOLOGY & ADDITIONAL STUDIES:  ***  < from: Xray Chest 1 View- PORTABLE-Urgent (Xray Chest 1 View- PORTABLE-Urgent .) (12.24.23 @ 22:27) >  FINDINGS:  Prior study of earlier the same day was available for review.    The lungs demonstrate patchy bilateral lower lobe airspace opacities.   Small right pleuraleffusion is noted. Endotracheal tube is noted in   satisfactory position above the angela.    The heart and mediastinum appear intact.  Fullness of both hilar regions   is noted.      IMPRESSION: Patchy bilateral lower lobe airspace opacities are noted.   Small right pleural effusion is seen. Endotracheal tube noted in   satisfactory position above the angela.    --- End of Report ---    < end of copied text >  < from: CT Chest No Cont (12.24.23 @ 21:43) >  PROCEDURE:  CT of the Chest was performed without intravenous contrast.  Sagittal and coronal reformats were performed.      FINDINGS: Absence of intravenous contrast limits evaluation of   vasculature and solid organs.    CHEST:  LUNGS AND LARGE AIRWAYS: Patent central airways.  Emphysematous changes.   Patchy opacities in the right middle lobe and bilateral lower lobes with   areas of consolidation.  Continued peribronchial thickening and mucus   impacted airways of the right middle lobe and bilateral lower lobes  PLEURA: No pleural effusion.  VESSELS: Atherosclerotic changes.  HEART: Cardiomegaly. No pericardial effusion. Coronary artery   calcification and/or stenting.  MEDIASTINUM AND [place]: Stable mildly prominent mediastinal and right hilar   lymph nodes.  CHEST WALL AND LOWER NECK: Bilateral gynecomastia.  VISUALIZED UPPER ABDOMEN: Too small to characterize hepatic   hypodensities. Stable splenic hypodense lesion. Unchanged left adrenal   nodule. Partially imaged right renal cyst.  BONES: Degenerative changes.    IMPRESSION:    Patchy opacities in the right middle lobe and bilateral lower lobes with   areas of consolidation. Findings likely infectious/inflammatory or   aspiration related. Pulmonary imaging in 6 weeks advised to demonstrate   resolution.    --- End of Report ---            < end of copied text >  < from: CT Head No Cont (12.24.23 @ 21:42) >  FINDINGS:    There is no intracranial hemorrhage, abnormal extra-axial fluid   collection, mass effect, midline shift or large acute cortical infarct.    Gray-white differentiation is maintained.  There are subcortical and   periventricular white matter lucencies likely representing microvascular   ischemic disease.    The mastoid air cells and visualized paranasal sinuses are well aerated.    IMPRESSION:    No intracranial hemorrhage, mass effect or large acute cortical infarct.    --- End of Report ---        < end of copied text >    Goals of Care Discussion with Family/Proxy/Other   - see note from 12/25     DEVIKA CARBALLO    SCU progress note    INTERVAL HPI/OVERNIGHT EVENTS: ***transferred from ICU to SCU overnight.  HPI: 62 yo male from [facility], active smoker, with a PMH of CAD, CHF on nocturnal AVAPS-AE (Trilogy), COPD ( MULTIPLE intubations in past on 3L of home O2), peripheral neuropathy, GERD, HTN, HLD, obesity, polyarthritis, Pulmonary HTN, TIA, Vit D Def, presents with hypoxemia from nursing facility and lethargy. Upon evaluation in ED, patient afebrile, BP of 126/58, tachycardic to 115bpm, hypoxic placed on 15L NRBM saturating 97%. ABG showing ph of 7.17, CO2 of 145. Patient noted to be lethargic and subsequently intubated. Physical examination noted for dry mucus membranes, Tejada in place immediately draining 2L clear urine. Lungs clear to auscultation. Labs significant for no leukocytosis, lactate 0.9, negative cardiac enzymes and UA negative for infection. CT head negative for any acute intracranial processes. CT chest showing patchy opacities in the right middle lobe and bilateral lower lobes with areas of consolidation suspicious for infectious etiology. EKG sinus tachycardia 115bpm with RBBB with no acute ischemic changes. Patient admitted to ICU for Acute on chronic hypoxic hypercapnic respiratory failure 2/2 Suspected COPD exacerbation.   <ICU course>  Patient when admitted to ICU, treated for PNA with Rocephin and azithromycin. Patient remained on mechanical ventilator for 1 day. Hypercapnia resolved and the next day 12/26 patient was extubated. Tejada removed and patient was found to be retaining urine straight cath was done resulting in >700 cc of urine. Patient found to be retaining and Tejada was replaced. Blood cultures Blood and urine cultures are negative to date. Sputum cultures noted to be growing pseudomonas. Legionella negative. Patient completed azithromycin and was started on prednisone taper.  PT recommended home PT. Patient refusing nocturnal BiPAP.       DNR [ ]   DNI  [  ]   Full code    Covid - 19 PCR:  negative 12/24    The 4Ms    What Matters Most: see GOC  Age appropriate Medications/Screen for High Risk Medication: Yes  Mentation: see CAM below  Mobility: defer to physical exam    The Confusion Assessment Method (CAM) Diagnostic Algorithm     1: Acute Onset or Fluctuating Course  - Is there evidence of an acute change in mental status from the patient’s baseline? Did the (abnormal) behavior  fluctuate during the day, that is, tend to come and go, or increase and decrease in severity?  [ ] YES [x ] NO     2: Inattention  - Did the patient have difficulty focusing attention, being easily distractible, or having difficulty keeping track of what was being said?  [ ] YES [x ] NO     3: Disorganized thinking  -Was the patient’s thinking disorganized or incoherent, such as rambling or irrelevant conversation, unclear or illogical flow of ideas, or unpredictable switching from subject to subject?  [ ] YES [x ] NO    4: Altered Level of consciousness?  [ ] YES [x ] NO    The diagnosis of delirium by CAM requires the presence of features 1 and 2 and either 3 or 4.    PRESSORS: [ ] YES [ x] NO  cefTRIAXone   IVPB 1000 milliGRAM(s) IV Intermittent every 24 hours    Cardiovascular:  Heart Failure  Acute   Acute on Chronic  Chronic         Pulmonary:  albuterol/ipratropium for Nebulization 3 milliLiter(s) Nebulizer every 6 hours  budesonide 160 MICROgram(s)/formoterol 4.5 MICROgram(s) Inhaler 2 Puff(s) Inhalation two times a day    Hematalogic:  aspirin enteric coated 81 milliGRAM(s) Oral daily  heparin   Injectable 5000 Unit(s) SubCutaneous every 8 hours    Other:  finasteride 5 milliGRAM(s) Oral daily  gabapentin 100 milliGRAM(s) Oral three times a day  glucagon  Injectable 1 milliGRAM(s) IntraMuscular once  insulin glargine Injectable (LANTUS) 20 Unit(s) SubCutaneous at bedtime  insulin lispro (ADMELOG) corrective regimen sliding scale   SubCutaneous three times a day before meals  insulin lispro (ADMELOG) corrective regimen sliding scale   SubCutaneous at bedtime  insulin lispro Injectable (ADMELOG) 7 Unit(s) SubCutaneous three times a day before meals  latanoprost 0.005% Ophthalmic Solution 1 Drop(s) Both EYES at bedtime  lidocaine   4% Patch 1 Patch Transdermal daily  pantoprazole  Injectable 40 milliGRAM(s) IV Push daily  predniSONE   Tablet   Oral   predniSONE   Tablet 40 milliGRAM(s) Oral daily  tamsulosin 0.4 milliGRAM(s) Oral at bedtime  traZODone 150 milliGRAM(s) Oral daily    albuterol/ipratropium for Nebulization 3 milliLiter(s) Nebulizer every 6 hours  aspirin enteric coated 81 milliGRAM(s) Oral daily  budesonide 160 MICROgram(s)/formoterol 4.5 MICROgram(s) Inhaler 2 Puff(s) Inhalation two times a day  cefTRIAXone   IVPB 1000 milliGRAM(s) IV Intermittent every 24 hours  finasteride 5 milliGRAM(s) Oral daily  gabapentin 100 milliGRAM(s) Oral three times a day  glucagon  Injectable 1 milliGRAM(s) IntraMuscular once  heparin   Injectable 5000 Unit(s) SubCutaneous every 8 hours  insulin glargine Injectable (LANTUS) 20 Unit(s) SubCutaneous at bedtime  insulin lispro (ADMELOG) corrective regimen sliding scale   SubCutaneous three times a day before meals  insulin lispro (ADMELOG) corrective regimen sliding scale   SubCutaneous at bedtime  insulin lispro Injectable (ADMELOG) 7 Unit(s) SubCutaneous three times a day before meals  latanoprost 0.005% Ophthalmic Solution 1 Drop(s) Both EYES at bedtime  lidocaine   4% Patch 1 Patch Transdermal daily  pantoprazole  Injectable 40 milliGRAM(s) IV Push daily  predniSONE   Tablet   Oral   predniSONE   Tablet 40 milliGRAM(s) Oral daily  tamsulosin 0.4 milliGRAM(s) Oral at bedtime  traZODone 150 milliGRAM(s) Oral daily    Drug Dosing Weight  Height (cm): 185.4 (06 Oct 2023 18:01)  Weight (kg): 88.7 (27 Dec 2023 07:57)  BMI (kg/m2): 25.8 (27 Dec 2023 07:57)  BSA (m2): 2.13 (27 Dec 2023 07:57)    CENTRAL LINE: [ ] YES [x ] NO  LOCATION:   DATE INSERTED:  REMOVE: [ ] YES [ ] NO  EXPLAIN:    MT: [x ] YES [ ] NO    DATE INSERTED:  REMOVE:  [ ] YES [x ] NO  EXPLAIN:  Failed TOV    PAST MEDICAL & SURGICAL HISTORY:  COPD (chronic obstructive pulmonary disease)  Oxygen 2-3L at home      Stented coronary artery  2017      HLD (hyperlipidemia)      Pulmonary HTN      Type 2 diabetes mellitus without complication, with long-term current use of insulin      DM (diabetes mellitus)      CAD (coronary artery disease)      GERD (gastroesophageal reflux disease)      Insomnia      CHF (congestive heart failure)      HTN (hypertension)      Mood disorder      No significant past surgical history            ABG - ( 27 Dec 2023 03:19 )  pH, Arterial: 7.49  pH, Blood: x     /  pCO2: 49    /  pO2: 78    / HCO3: 37    / Base Excess: 12.1  /  SaO2: 97                    12-27 @ 07:01  -  12-28 @ 07:00  --------------------------------------------------------  IN: 1010 mL / OUT: 4375 mL / NET: -3365 mL            PHYSICAL EXAM:    GENERAL: NAD, obese male  HEAD:  Atraumatic, Normocephalic  EYES: EOMI, PERRLA, conjunctiva and sclera clear  ENMT: No tonsillar erythema, exudates  NECK: Supple, No JVD  NERVOUS SYSTEM:  Alert & Oriented X3, Follows commands, moving all extremities.  CHEST/LUNG: Diminished breath sounds bilateral bases.  HEART: Regular rate and rhythm; No murmurs, rubs, or gallops  ABDOMEN: Soft, Obese,  Nontender, Nondistended; Bowel sounds present  EXTREMITIES:  2+ Peripheral Pulses, No clubbing, cyanosis, or edema  LYMPH: No lymphadenopathy noted  SKIN: No rashes or lesions      LABS:  CBC Full  -  ( 27 Dec 2023 04:00 )  WBC Count : 11.82 K/uL  RBC Count : 4.18 M/uL  Hemoglobin : 11.6 g/dL  Hematocrit : 35.4 %  Platelet Count - Automated : 184 K/uL  Mean Cell Volume : 84.7 fl  Mean Cell Hemoglobin : 27.8 pg  Mean Cell Hemoglobin Concentration : 32.8 gm/dL  Auto Neutrophil # : 10.19 K/uL  Auto Lymphocyte # : 0.83 K/uL  Auto Monocyte # : 0.62 K/uL  Auto Eosinophil # : 0.03 K/uL  Auto Basophil # : 0.02 K/uL  Auto Neutrophil % : 86.2 %  Auto Lymphocyte % : 7.0 %  Auto Monocyte % : 5.2 %  Auto Eosinophil % : 0.3 %  Auto Basophil % : 0.2 %    12-27    131<L>  |  92<L>  |  21<H>  ----------------------------<  340<H>  3.9   |  35<H>  |  0.61    Ca    8.2<L>      27 Dec 2023 04:00  Phos  3.1     12-27  Mg     1.7     12-27        Urinalysis Basic - ( 27 Dec 2023 04:00 )    Color: x / Appearance: x / SG: x / pH: x  Gluc: 340 mg/dL / Ketone: x  / Bili: x / Urobili: x   Blood: x / Protein: x / Nitrite: x   Leuk Esterase: x / RBC: x / WBC x   Sq Epi: x / Non Sq Epi: x / Bacteria: x            [  ]  DVT Prophylaxis  [  ]  Nutrition, Brand, Rate         Goal Rate        Abnormal Nutritional Status -  Malnutrition   Cachexia      Morbid Obesity BMI >/=40    RADIOLOGY & ADDITIONAL STUDIES:  ***  < from: Xray Chest 1 View- PORTABLE-Urgent (Xray Chest 1 View- PORTABLE-Urgent .) (12.24.23 @ 22:27) >  FINDINGS:  Prior study of earlier the same day was available for review.    The lungs demonstrate patchy bilateral lower lobe airspace opacities.   Small right pleuraleffusion is noted. Endotracheal tube is noted in   satisfactory position above the angela.    The heart and mediastinum appear intact.  Fullness of both hilar regions   is noted.      IMPRESSION: Patchy bilateral lower lobe airspace opacities are noted.   Small right pleural effusion is seen. Endotracheal tube noted in   satisfactory position above the angela.    --- End of Report ---    < end of copied text >  < from: CT Chest No Cont (12.24.23 @ 21:43) >  PROCEDURE:  CT of the Chest was performed without intravenous contrast.  Sagittal and coronal reformats were performed.      FINDINGS: Absence of intravenous contrast limits evaluation of   vasculature and solid organs.    CHEST:  LUNGS AND LARGE AIRWAYS: Patent central airways.  Emphysematous changes.   Patchy opacities in the right middle lobe and bilateral lower lobes with   areas of consolidation.  Continued peribronchial thickening and mucus   impacted airways of the right middle lobe and bilateral lower lobes  PLEURA: No pleural effusion.  VESSELS: Atherosclerotic changes.  HEART: Cardiomegaly. No pericardial effusion. Coronary artery   calcification and/or stenting.  MEDIASTINUM AND [place]: Stable mildly prominent mediastinal and right hilar   lymph nodes.  CHEST WALL AND LOWER NECK: Bilateral gynecomastia.  VISUALIZED UPPER ABDOMEN: Too small to characterize hepatic   hypodensities. Stable splenic hypodense lesion. Unchanged left adrenal   nodule. Partially imaged right renal cyst.  BONES: Degenerative changes.    IMPRESSION:    Patchy opacities in the right middle lobe and bilateral lower lobes with   areas of consolidation. Findings likely infectious/inflammatory or   aspiration related. Pulmonary imaging in 6 weeks advised to demonstrate   resolution.    --- End of Report ---            < end of copied text >  < from: CT Head No Cont (12.24.23 @ 21:42) >  FINDINGS:    There is no intracranial hemorrhage, abnormal extra-axial fluid   collection, mass effect, midline shift or large acute cortical infarct.    Gray-white differentiation is maintained.  There are subcortical and   periventricular white matter lucencies likely representing microvascular   ischemic disease.    The mastoid air cells and visualized paranasal sinuses are well aerated.    IMPRESSION:    No intracranial hemorrhage, mass effect or large acute cortical infarct.    --- End of Report ---        < end of copied text >    Goals of Care Discussion with Family/Proxy/Other   - see note from 12/25

## 2023-12-28 NOTE — PROGRESS NOTE ADULT - PROBLEM SELECTOR PLAN 8
DVT and GI prophylaxis.  Continue oxygen support.  Monitor oxygen saturation.  Refusing to use BIPAP.  Sputum positive for psuedomonas.  Continue antibiotics  FULL CODE.  PT eval pending.

## 2023-12-28 NOTE — PROGRESS NOTE ADULT - NS ATTEND AMEND GEN_ALL_CORE FT
Impression; This is a 62 Y/O Male from [facility] who is an active smoker. Has XHF on Nocturnal AVAPS (Trilogy ) but non compliant. Was in ICU, treated for Pneumonia with Rocephin and Azithromycin. Had an episode that time with Lethargy and got intubated , was on Mechanical Ventilator x 1 DAY , extubated on 12-26-23. Admitted with Acute hypoxic hypercapnic respiratory failure secondary to COPD GOLD 4D. Sputum +ve for Pseudomonas Aeruginosa .     Suggestion:  O2 saturation 95% with O2 supplement . Continue Oxygen supplement 2L NC .   Reinforced the importance of smoking cessation and Daily compliance to PAP/NIPPV to prevent hospitalization and reintubation but patient refused to have it at Night .  On Ceftriaxone 1 Gm IVPB Daily.   Continue Duoneb via nebulization Q 6 Hours.  Continue Symbicort 160-4.5 mcg 2 puffs Twice daily.   DVT / GI prophylactic. On Heparin 5,000 Units SQ Q 8 Hours.    On Prednisone 40 mg Oral daily.   Monitor Blood glucose with insulin coverage . Impression; This is a 62 Y/O Male from [facility] who is an active smoker. Has XHF on Nocturnal AVAPS (Trilogy ) but non compliant. Was in ICU, treated for Pneumonia with Rocephin and Azithromycin. Had an episode that time with Lethargy and got intubated , was on Mechanical Ventilator x 1 DAY , extubated on 12-26-23. Admitted with Acute hypoxic hypercapnic respiratory failure secondary to COPD GOLD 4D. Sputum +ve for Pseudomonas Aeruginosa .     Suggestion:  O2 saturation 95% with O2 supplement . Continue Oxygen supplement 2L NC .   Reinforced the importance of smoking cessation and Daily compliance to PAP/NIPPV to prevent hospitalization and reintubation but patient refused to have it at Night .  On Ceftriaxone 1 Gm IVPB Daily.   Continue Duoneb via nebulization Q 6 Hours.  Continue Symbicort 160-4.5 mcg 2 puffs Twice daily.   DVT / GI prophylactic. On Heparin 5,000 Units SQ Q 8 Hours.    On Prednisone 40 mg Oral daily.   Monitor Blood glucose with insulin coverage .

## 2023-12-28 NOTE — DISCHARGE NOTE PROVIDER - PROVIDER TOKENS
FREE:[LAST:[Facility Provider],PHONE:[(   )    -],FAX:[(   )    -],FOLLOWUP:[1-3 days]],PROVIDER:[TOKEN:[[id number]],FOLLOWUP:[2 weeks]]

## 2023-12-28 NOTE — DISCHARGE NOTE PROVIDER - CARE PROVIDER_API CALL
Facility Provider,   Phone: (   )    -  Fax: (   )    -  Follow Up Time: 1-3 days    Forrest Marquez  Internal Medicine  [facility], [place], operated [facility], [address] [address]  Phone: [phone]  Fax: [phone]  Follow Up Time: 2 weeks   Facility Provider,   Phone: (   )    -  Fax: (   )    -  Follow Up Time: 1-3 days    Forrest Marquez  Internal Medicine  [facility], [address] [address]  Phone: [phone]  Fax: [phone]  Follow Up Time: 2 weeks

## 2023-12-28 NOTE — PROGRESS NOTE ADULT - NS ATTEND AMEND GEN_ALL_CORE FT
63M with PMH as listed above including COPD (on home oxygen 3L), CAD, CHF presented to the ED with worsening SOB with respiratory distress.. In the ED he was found to be in copd exacerbation and in acute on chronic hypercapnic respiratory failure and he was intubated and placed on mechanical ventilation by the ED team. Soon after intubation he became hypotensive and was resuscitated with IV fluid boluses with NS to at least 4-5Liters.  At time of ICU evaluation, patient orally intubated  on mech vent and peripheral levophed on-going to maintain MAP >65. Patient is accepted to ICU for further management and closer monitoring.     Assessment  Acute on chronic hypercapnic respiratory failure from COPD exacerbation s/p intubation.  Acute exacerbation of COPD.  Hypovolemia from dehydrated state,  Right sided pneumonia  Shock- hypovolemic, cardiogenic   Hypovolemic hyponatremia, mild  COPD  HTN  CAD   CHF w Pulmonary HTN,    Plan  Extubated 12/26   Albuterol/Atrovent nebulizer q6hrs  Bipap at night and as needed, on home bipap support  Transition to PO steroids   IV Azithromycin for copd exacerbation x 3 doses  Sputum cultures with Pseudomonas sensivite to Levofloxacin, change to Levofloxacin  Cont.  oral diet  PT evaluation  OOB to chair  Void trial in AM  Cont BPH meds  DVT prophylaxis with Lovenox.  GI prophylaxis with protonix

## 2023-12-28 NOTE — DISCHARGE NOTE PROVIDER - NSDCCPCAREPLAN_GEN_ALL_CORE_FT
PRINCIPAL DISCHARGE DIAGNOSIS  Diagnosis: Acute on chronic respiratory failure with hypoxia and hypercapnia  Assessment and Plan of Treatment: You were admitted to the hospital secondary to COPD exacerbation. Your carban dioxide level was extremely high when you were admitted to the hospital (145). because of this you had to be intubated and placed on a ventilator. Your CO2 level should be less than 35. Continue using your oxygen as prescribed. You would benefit from using a AVAPS machine at your Assisted Living. Continue using your bronchdilators/inhalers as prescribed. Report any changes in breathing immediately.      SECONDARY DISCHARGE DIAGNOSES  Diagnosis: Acute metabolic encephalopathy  Assessment and Plan of Treatment: Secondary to very high carban dioxide levels. You had to be intubated upon admission and placed on a respirator. Your mental status improved after your intubation. Continue using your oxygen as prescribed.    Diagnosis: Pneumonia due to Pseudomonas  Assessment and Plan of Treatment: You were found to have bacteria in your sputum. Your antibiiotics were changed to Levaquin. Continue taking antibiotics as prescribed. You need to take a total of 7 days of levaquin.    Diagnosis: End stage COPD  Assessment and Plan of Treatment: Continue using oxygen as prescribed. You would benefit from using a machine at night time. Since you don't want to use this machine you are a high risk for readmission and intubation. Continue using your inhalers as prescribed. Report any changes in your breathing immediately to your doctor. Remember to rinse your mouth after using your inhalers.    Diagnosis: DM (diabetes mellitus)  Assessment and Plan of Treatment: Continue taking your insulin as prescribed. Monitor your glucose level.    Diagnosis: Pulmonary HTN  Assessment and Plan of Treatment: Continue taking medications as prescribed. Follow a low sodium diet.    Diagnosis: HTN (hypertension)  Assessment and Plan of Treatment: Continue taking your medications as prescribed. Follow a low sodium diet.     PRINCIPAL DISCHARGE DIAGNOSIS  Diagnosis: Acute on chronic respiratory failure with hypoxia and hypercapnia  Assessment and Plan of Treatment: You were admitted to the hospital secondary to COPD exacerbation. Your carban dioxide level was extremely high when you were admitted to the hospital (145). because of this you had to be intubated and placed on a ventilator. Your CO2 level should be less than 35. Continue using your oxygen as prescribed. You would benefit from using a AVAPS machine at your Assisted Living. Continue using your bronchdilators/inhalers as prescribed. Report any changes in breathing immediately.      SECONDARY DISCHARGE DIAGNOSES  Diagnosis: Acute metabolic encephalopathy  Assessment and Plan of Treatment: Secondary to very high carban dioxide levels. You had to be intubated upon admission and placed on a respirator. Your mental status improved after your intubation. Continue using your oxygen as prescribed.    Diagnosis: Pneumonia due to Pseudomonas  Assessment and Plan of Treatment: You were found to have bacteria in your sputum. Your antibiiotics were changed to Levaquin. Continue taking antibiotics as prescribed. You need to take a total of 7 days of levaquin.    Diagnosis: End stage COPD  Assessment and Plan of Treatment: Continue using oxygen as prescribed. You would benefit from using a machine at night time. Since you don't want to use this machine you are a high risk for readmission and intubation. Continue using your inhalers as prescribed. Report any changes in your breathing immediately to your doctor. Remember to rinse your mouth after using your inhalers.    Diagnosis: DM (diabetes mellitus)  Assessment and Plan of Treatment: Continue taking your insulin as prescribed. Monitor your glucose level.    Diagnosis: HTN (hypertension)  Assessment and Plan of Treatment: Continue taking your medications as prescribed. Follow a low sodium diet.    Diagnosis: Pulmonary HTN  Assessment and Plan of Treatment: Continue taking medications as prescribed. Follow a low sodium diet.

## 2023-12-29 LAB
ALBUMIN SERPL ELPH-MCNC: 3 G/DL — LOW (ref 3.5–5)
ALBUMIN SERPL ELPH-MCNC: 3 G/DL — LOW (ref 3.5–5)
ALP SERPL-CCNC: 58 U/L — SIGNIFICANT CHANGE UP (ref 40–120)
ALP SERPL-CCNC: 58 U/L — SIGNIFICANT CHANGE UP (ref 40–120)
ALT FLD-CCNC: 79 U/L DA — HIGH (ref 10–60)
ALT FLD-CCNC: 79 U/L DA — HIGH (ref 10–60)
ANION GAP SERPL CALC-SCNC: 5 MMOL/L — SIGNIFICANT CHANGE UP (ref 5–17)
ANION GAP SERPL CALC-SCNC: 5 MMOL/L — SIGNIFICANT CHANGE UP (ref 5–17)
AST SERPL-CCNC: 24 U/L — SIGNIFICANT CHANGE UP (ref 10–40)
AST SERPL-CCNC: 24 U/L — SIGNIFICANT CHANGE UP (ref 10–40)
BILIRUB SERPL-MCNC: 0.3 MG/DL — SIGNIFICANT CHANGE UP (ref 0.2–1.2)
BILIRUB SERPL-MCNC: 0.3 MG/DL — SIGNIFICANT CHANGE UP (ref 0.2–1.2)
BUN SERPL-MCNC: 23 MG/DL — HIGH (ref 7–18)
BUN SERPL-MCNC: 23 MG/DL — HIGH (ref 7–18)
CALCIUM SERPL-MCNC: 7.9 MG/DL — LOW (ref 8.4–10.5)
CALCIUM SERPL-MCNC: 7.9 MG/DL — LOW (ref 8.4–10.5)
CHLORIDE SERPL-SCNC: 95 MMOL/L — LOW (ref 96–108)
CHLORIDE SERPL-SCNC: 95 MMOL/L — LOW (ref 96–108)
CO2 SERPL-SCNC: 29 MMOL/L — SIGNIFICANT CHANGE UP (ref 22–31)
CO2 SERPL-SCNC: 29 MMOL/L — SIGNIFICANT CHANGE UP (ref 22–31)
CREAT SERPL-MCNC: 0.9 MG/DL — SIGNIFICANT CHANGE UP (ref 0.5–1.3)
CREAT SERPL-MCNC: 0.9 MG/DL — SIGNIFICANT CHANGE UP (ref 0.5–1.3)
EGFR: 97 ML/MIN/1.73M2 — SIGNIFICANT CHANGE UP
EGFR: 97 ML/MIN/1.73M2 — SIGNIFICANT CHANGE UP
GLUCOSE SERPL-MCNC: 520 MG/DL — CRITICAL HIGH (ref 70–99)
GLUCOSE SERPL-MCNC: 520 MG/DL — CRITICAL HIGH (ref 70–99)
POTASSIUM SERPL-MCNC: 5.1 MMOL/L — SIGNIFICANT CHANGE UP (ref 3.5–5.3)
POTASSIUM SERPL-MCNC: 5.1 MMOL/L — SIGNIFICANT CHANGE UP (ref 3.5–5.3)
POTASSIUM SERPL-SCNC: 5.1 MMOL/L — SIGNIFICANT CHANGE UP (ref 3.5–5.3)
POTASSIUM SERPL-SCNC: 5.1 MMOL/L — SIGNIFICANT CHANGE UP (ref 3.5–5.3)
PROT SERPL-MCNC: 6.5 G/DL — SIGNIFICANT CHANGE UP (ref 6–8.3)
PROT SERPL-MCNC: 6.5 G/DL — SIGNIFICANT CHANGE UP (ref 6–8.3)
SODIUM SERPL-SCNC: 129 MMOL/L — LOW (ref 135–145)
SODIUM SERPL-SCNC: 129 MMOL/L — LOW (ref 135–145)

## 2023-12-29 RX ORDER — INSULIN LISPRO 100/ML
10 VIAL (ML) SUBCUTANEOUS
Refills: 0 | Status: DISCONTINUED | OUTPATIENT
Start: 2023-12-29 | End: 2024-01-02

## 2023-12-29 RX ORDER — INSULIN LISPRO 100/ML
10 VIAL (ML) SUBCUTANEOUS ONCE
Refills: 0 | Status: COMPLETED | OUTPATIENT
Start: 2023-12-29 | End: 2023-12-29

## 2023-12-29 RX ADMIN — Medication 3 MILLILITER(S): at 15:39

## 2023-12-29 RX ADMIN — FINASTERIDE 5 MILLIGRAM(S): 5 TABLET, FILM COATED ORAL at 11:59

## 2023-12-29 RX ADMIN — HEPARIN SODIUM 5000 UNIT(S): 5000 INJECTION INTRAVENOUS; SUBCUTANEOUS at 15:08

## 2023-12-29 RX ADMIN — Medication 7 UNIT(S): at 08:15

## 2023-12-29 RX ADMIN — Medication 3 MILLILITER(S): at 20:23

## 2023-12-29 RX ADMIN — Medication 4: at 17:27

## 2023-12-29 RX ADMIN — TAMSULOSIN HYDROCHLORIDE 0.4 MILLIGRAM(S): 0.4 CAPSULE ORAL at 21:28

## 2023-12-29 RX ADMIN — Medication 4: at 08:15

## 2023-12-29 RX ADMIN — Medication 10 UNIT(S): at 17:28

## 2023-12-29 RX ADMIN — PANTOPRAZOLE SODIUM 40 MILLIGRAM(S): 20 TABLET, DELAYED RELEASE ORAL at 06:57

## 2023-12-29 RX ADMIN — Medication 5 MILLIGRAM(S): at 21:28

## 2023-12-29 RX ADMIN — GABAPENTIN 100 MILLIGRAM(S): 400 CAPSULE ORAL at 06:56

## 2023-12-29 RX ADMIN — HEPARIN SODIUM 5000 UNIT(S): 5000 INJECTION INTRAVENOUS; SUBCUTANEOUS at 21:28

## 2023-12-29 RX ADMIN — Medication 81 MILLIGRAM(S): at 12:00

## 2023-12-29 RX ADMIN — Medication 12: at 11:58

## 2023-12-29 RX ADMIN — Medication 10 UNIT(S): at 12:04

## 2023-12-29 RX ADMIN — GABAPENTIN 100 MILLIGRAM(S): 400 CAPSULE ORAL at 15:08

## 2023-12-29 RX ADMIN — INSULIN GLARGINE 20 UNIT(S): 100 INJECTION, SOLUTION SUBCUTANEOUS at 22:12

## 2023-12-29 RX ADMIN — HEPARIN SODIUM 5000 UNIT(S): 5000 INJECTION INTRAVENOUS; SUBCUTANEOUS at 06:56

## 2023-12-29 RX ADMIN — LATANOPROST 1 DROP(S): 0.05 SOLUTION/ DROPS OPHTHALMIC; TOPICAL at 21:29

## 2023-12-29 RX ADMIN — Medication 40 MILLIGRAM(S): at 06:57

## 2023-12-29 RX ADMIN — GABAPENTIN 100 MILLIGRAM(S): 400 CAPSULE ORAL at 21:28

## 2023-12-29 NOTE — PROGRESS NOTE ADULT - PROBLEM SELECTOR PLAN 5
Continue 20U lantus HS  7U lispro before meals.  Moderate sliding scale coverage.  A1c elevated 9.2 upon admission Continue 20U lantus HS  Increase lispro to 10U before meals.  Moderate sliding scale coverage.  A1c elevated 9.2 upon admission

## 2023-12-29 NOTE — PROGRESS NOTE ADULT - ASSESSMENT
62 yo male from [facility], active smoker, with a PMH of CAD, CHF on nocturnal AVAPS-AE (Trilogy), COPD ( MULTIPLE intubations in past on 3L of home O2), peripheral neuropathy, GERD, HTN, HLD, obesity, polyarthritis, Pulmonary HTN, TIA, Vit D Def, presents with hypoxemia from nursing facility and lethargy. Upon evaluation in ED, patient afebrile, BP of 126/58, tachycardic to 115bpm, hypoxic placed on 15L NRBM saturating 97%. ABG showing ph of 7.17, CO2 of 145. Patient noted to be lethargic and subsequently intubated. Physical examination noted for dry mucus membranes, Tejada in place immediately draining 2L clear urine. Lungs clear to auscultation. Labs significant for no leukocytosis, lactate 0.9, negative cardiac enzymes and UA negative for infection. CT head negative for any acute intracranial processes. CT chest showing patchy opacities in the right middle lobe and bilateral lower lobes with areas of consolidation suspicious for infectious etiology. EKG sinus tachycardia 115bpm with RBBB with no acute ischemic changes. Patient admitted to ICU for Acute on chronic hypoxic hypercapnic respiratory failure 2/2 Suspected COPD exacerbation.   <ICU course>  Patient when admitted to ICU, treated for PNA with Rocephin and azithromycin. Patient remained on mechanical ventilator for 1 day. Hypercapnia resolved and the next day 12/26 patient was extubated. Tejada removed and patient was found to be retaining urine straight cath was done resulting in >700 cc of urine. Patient found to be retaining and Tejada was replaced. Blood cultures Blood and urine cultures are negative to date. Sputum cultures noted to be growing pseudomonas. Legionella negative. Patient completed azithromycin and was started on prednisone taper.  PT recommended home PT. Patient refusing nocturnal BiPAP.      64 yo male from [facility], active smoker, with a PMH of CAD, CHF on nocturnal AVAPS-AE (Trilogy), COPD ( MULTIPLE intubations in past on 3L of home O2), peripheral neuropathy, GERD, HTN, HLD, obesity, polyarthritis, Pulmonary HTN, TIA, Vit D Def, presents with hypoxemia from nursing facility and lethargy. Upon evaluation in ED, patient afebrile, BP of 126/58, tachycardic to 115bpm, hypoxic placed on 15L NRBM saturating 97%. ABG showing ph of 7.17, CO2 of 145. Patient noted to be lethargic and subsequently intubated. Physical examination noted for dry mucus membranes, Tejada in place immediately draining 2L clear urine. Lungs clear to auscultation. Labs significant for no leukocytosis, lactate 0.9, negative cardiac enzymes and UA negative for infection. CT head negative for any acute intracranial processes. CT chest showing patchy opacities in the right middle lobe and bilateral lower lobes with areas of consolidation suspicious for infectious etiology. EKG sinus tachycardia 115bpm with RBBB with no acute ischemic changes. Patient admitted to ICU for Acute on chronic hypoxic hypercapnic respiratory failure 2/2 Suspected COPD exacerbation.   <ICU course>  Patient when admitted to ICU, treated for PNA with Rocephin and azithromycin. Patient remained on mechanical ventilator for 1 day. Hypercapnia resolved and the next day 12/26 patient was extubated. Tejada removed and patient was found to be retaining urine straight cath was done resulting in >700 cc of urine. Patient found to be retaining and Tejada was replaced. Blood cultures Blood and urine cultures are negative to date. Sputum cultures noted to be growing pseudomonas. Legionella negative. Patient completed azithromycin and was started on prednisone taper.  PT recommended home PT. Patient refusing nocturnal BiPAP.

## 2023-12-29 NOTE — PROGRESS NOTE ADULT - PROBLEM SELECTOR PLAN 8
DVT and GI prophylaxis.  Continue oxygen support.  Monitor oxygen saturation.  Refusing to use BIPAP.  Sputum positive for psuedomonas.  Continue antibiotics  FULL CODE.  PT eval pending. DVT and GI prophylaxis.  Continue oxygen support.  Monitor oxygen saturation.  Refusing to use BIPAP.  Sputum positive for pseudomonas.  Continue antibiotics  FULL CODE.  Tejada discontinue at 0630. Monitor output.

## 2023-12-29 NOTE — PROGRESS NOTE ADULT - SUBJECTIVE AND OBJECTIVE BOX
DEVIKA CARBALLO    SCU progress note    INTERVAL HPI/OVERNIGHT EVENTS: ***Amor cath removed this morning at 0600.    DNR [ ]   DNI  [  ]  FULL CODE    Covid - 19 PCR:     The 4Ms    What Matters Most: see GOC  Age appropriate Medications/Screen for High Risk Medication: Yes  Mentation: see CAM below  Mobility: defer to physical exam    The Confusion Assessment Method (CAM) Diagnostic Algorithm     1: Acute Onset or Fluctuating Course  - Is there evidence of an acute change in mental status from the patient’s baseline? Did the (abnormal) behavior  fluctuate during the day, that is, tend to come and go, or increase and decrease in severity?  [ ] YES [x ] NO     2: Inattention  - Did the patient have difficulty focusing attention, being easily distractible, or having difficulty keeping track of what was being said?  [ ] YES [x ] NO     3: Disorganized thinking  -Was the patient’s thinking disorganized or incoherent, such as rambling or irrelevant conversation, unclear or illogical flow of ideas, or unpredictable switching from subject to subject?  [ ] YES [x ] NO    4: Altered Level of consciousness?  [ ] YES [x ] NO    The diagnosis of delirium by CAM requires the presence of features 1 and 2 and either 3 or 4.    PRESSORS: [ ] YES [x ] NO  levoFLOXacin  Tablet 500 milliGRAM(s) Oral every 24 hours    Cardiovascular:  Heart Failure  Acute   Acute on Chronic  Chronic         Pulmonary:  albuterol/ipratropium for Nebulization 3 milliLiter(s) Nebulizer every 6 hours  budesonide 160 MICROgram(s)/formoterol 4.5 MICROgram(s) Inhaler 2 Puff(s) Inhalation two times a day    Hematalogic:  aspirin enteric coated 81 milliGRAM(s) Oral daily  heparin   Injectable 5000 Unit(s) SubCutaneous every 8 hours    Other:  finasteride 5 milliGRAM(s) Oral daily  gabapentin 100 milliGRAM(s) Oral three times a day  glucagon  Injectable 1 milliGRAM(s) IntraMuscular once  insulin glargine Injectable (LANTUS) 20 Unit(s) SubCutaneous at bedtime  insulin lispro (ADMELOG) corrective regimen sliding scale   SubCutaneous three times a day before meals  insulin lispro (ADMELOG) corrective regimen sliding scale   SubCutaneous at bedtime  insulin lispro Injectable (ADMELOG) 7 Unit(s) SubCutaneous three times a day before meals  latanoprost 0.005% Ophthalmic Solution 1 Drop(s) Both EYES at bedtime  lidocaine   4% Patch 1 Patch Transdermal daily  melatonin 5 milliGRAM(s) Oral at bedtime  pantoprazole    Tablet 40 milliGRAM(s) Oral before breakfast  predniSONE   Tablet   Oral   tamsulosin 0.4 milliGRAM(s) Oral at bedtime    albuterol/ipratropium for Nebulization 3 milliLiter(s) Nebulizer every 6 hours  aspirin enteric coated 81 milliGRAM(s) Oral daily  budesonide 160 MICROgram(s)/formoterol 4.5 MICROgram(s) Inhaler 2 Puff(s) Inhalation two times a day  finasteride 5 milliGRAM(s) Oral daily  gabapentin 100 milliGRAM(s) Oral three times a day  glucagon  Injectable 1 milliGRAM(s) IntraMuscular once  heparin   Injectable 5000 Unit(s) SubCutaneous every 8 hours  insulin glargine Injectable (LANTUS) 20 Unit(s) SubCutaneous at bedtime  insulin lispro (ADMELOG) corrective regimen sliding scale   SubCutaneous at bedtime  insulin lispro (ADMELOG) corrective regimen sliding scale   SubCutaneous three times a day before meals  insulin lispro Injectable (ADMELOG) 7 Unit(s) SubCutaneous three times a day before meals  latanoprost 0.005% Ophthalmic Solution 1 Drop(s) Both EYES at bedtime  levoFLOXacin  Tablet 500 milliGRAM(s) Oral every 24 hours  lidocaine   4% Patch 1 Patch Transdermal daily  melatonin 5 milliGRAM(s) Oral at bedtime  pantoprazole    Tablet 40 milliGRAM(s) Oral before breakfast  predniSONE   Tablet   Oral   tamsulosin 0.4 milliGRAM(s) Oral at bedtime    Drug Dosing Weight  Height (cm): 185.4 (06 Oct 2023 18:01)  Weight (kg): 88.7 (27 Dec 2023 07:57)  BMI (kg/m2): 25.8 (27 Dec 2023 07:57)  BSA (m2): 2.13 (27 Dec 2023 07:57)    CENTRAL LINE: [ ] YES [x ] NO  LOCATION:   DATE INSERTED:  REMOVE: [ ] YES [ ] NO  EXPLAIN:    AMOR: [ ] YES [x ] NO    DATE INSERTED:  REMOVE:  [ ] YES [ ] NO  EXPLAIN:    PAST MEDICAL & SURGICAL HISTORY:  COPD (chronic obstructive pulmonary disease)  Oxygen 2-3L at home      Stented coronary artery  2017      HLD (hyperlipidemia)      Pulmonary HTN      Type 2 diabetes mellitus without complication, with long-term current use of insulin      DM (diabetes mellitus)      CAD (coronary artery disease)      GERD (gastroesophageal reflux disease)      Insomnia      CHF (congestive heart failure)      HTN (hypertension)      Mood disorder      No significant past surgical history                  12-28 @ 07:01  -  12-29 @ 07:00  --------------------------------------------------------  IN: 0 mL / OUT: 2000 mL / NET: -2000 mL            PHYSICAL EXAM:    GENERAL: NAD, obese male  HEAD:  Atraumatic, Normocephalic  EYES: EOMI, PERRLA, conjunctiva and sclera clear  ENMT: No tonsillar erythema, exudates  NECK: Supple, No JVD  NERVOUS SYSTEM:  Alert & Oriented X3, Follows commands, moving all extremities.  CHEST/LUNG: Diminished breath sounds bilaterally.  HEART: Regular rate and rhythm; No murmurs, rubs, or gallops  ABDOMEN: Soft, Obese. Nontender, Nondistended; Bowel sounds present  EXTREMITIES:  2+ Peripheral Pulses, No clubbing, cyanosis, or edema  LYMPH: No lymphadenopathy noted  SKIN: No rashes or lesions      LABS:  CBC Full  -  ( 28 Dec 2023 07:25 )  WBC Count : 9.25 K/uL  RBC Count : 4.09 M/uL  Hemoglobin : 11.3 g/dL  Hematocrit : 35.3 %  Platelet Count - Automated : 166 K/uL  Mean Cell Volume : 86.3 fl  Mean Cell Hemoglobin : 27.6 pg  Mean Cell Hemoglobin Concentration : 32.0 gm/dL  Auto Neutrophil # : x  Auto Lymphocyte # : x  Auto Monocyte # : x  Auto Eosinophil # : x  Auto Basophil # : x  Auto Neutrophil % : x  Auto Lymphocyte % : x  Auto Monocyte % : x  Auto Eosinophil % : x  Auto Basophil % : x    12-28    135  |  98  |  17  ----------------------------<  243<H>  3.5   |  33<H>  |  0.61    Ca    8.9      28 Dec 2023 07:25  Phos  3.2     12-28  Mg     2.0     12-28    TPro  5.6<L>  /  Alb  2.6<L>  /  TBili  0.4  /  DBili  x   /  AST  18  /  ALT  42  /  AlkPhos  44  12-28      Urinalysis Basic - ( 28 Dec 2023 07:25 )    Color: x / Appearance: x / SG: x / pH: x  Gluc: 243 mg/dL / Ketone: x  / Bili: x / Urobili: x   Blood: x / Protein: x / Nitrite: x   Leuk Esterase: x / RBC: x / WBC x   Sq Epi: x / Non Sq Epi: x / Bacteria: x            [  ]  DVT Prophylaxis  [  ]  Nutrition, Brand, Rate         Goal Rate        Abnormal Nutritional Status -  Malnutrition   Cachexia      Morbid Obesity BMI >/=40    RADIOLOGY & ADDITIONAL STUDIES:  ***  < from: Xray Chest 1 View- PORTABLE-Urgent (Xray Chest 1 View- PORTABLE-Urgent .) (12.24.23 @ 22:27) >  INTERPRETATION:  Chest radiograph (one view)     CPT 76160    CLINICAL INFORMATION:  Assess ET tube placement.  No additional   information is provided.    TECHNIQUE:  Single frontal view of the chest was obtained.    FINDINGS:  Prior study of earlier the same day was available for review.    The lungs demonstrate patchy bilateral lower lobe airspace opacities.   Small right pleuraleffusion is noted. Endotracheal tube is noted in   satisfactory position above the angela.    The heart and mediastinum appear intact.  Fullness of both hilar regions   is noted.      IMPRESSION: Patchy bilateral lower lobe airspace opacities are noted.   Small right pleural effusion is seen. Endotracheal tube noted in   satisfactory position above the angela.    --- End of Report ---    < end of copied text >  < from: CT Chest No Cont (12.24.23 @ 21:43) >  PROCEDURE:  CT of the Chest was performed without intravenous contrast.  Sagittal and coronal reformats were performed.      FINDINGS: Absence of intravenous contrast limits evaluation of   vasculature and solid organs.    CHEST:  LUNGS AND LARGE AIRWAYS: Patent central airways.  Emphysematous changes.   Patchy opacities in the right middle lobe and bilateral lower lobes with   areas of consolidation.  Continued peribronchial thickening and mucus   impacted airways of the right middle lobe and bilateral lower lobes  PLEURA: No pleural effusion.  VESSELS: Atherosclerotic changes.  HEART: Cardiomegaly. No pericardial effusion. Coronary artery   calcification and/or stenting.  MEDIASTINUM AND [place]: Stable mildly prominent mediastinal and right hilar   lymph nodes.  CHEST WALL AND LOWER NECK: Bilateral gynecomastia.  VISUALIZED UPPER ABDOMEN: Too small to characterize hepatic   hypodensities. Stable splenic hypodense lesion. Unchanged left adrenal   nodule. Partially imaged right renal cyst.  BONES: Degenerative changes.    IMPRESSION:    Patchy opacities in the right middle lobe and bilateral lower lobes with   areas of consolidation. Findings likely infectious/inflammatory or   aspiration related. Pulmonary imaging in 6 weeks advised to demonstrate   resolution.    --- End of Report ---          < end of copied text >  < from: CT Head No Cont (12.24.23 @ 21:42) >  FINDINGS:    There is no intracranial hemorrhage, abnormal extra-axial fluid   collection, mass effect, midline shift or large acute cortical infarct.    Gray-white differentiation is maintained.  There are subcortical and   periventricular white matter lucencies likely representing microvascular   ischemic disease.    The mastoid air cells and visualized paranasal sinuses are well aerated.    IMPRESSION:    No intracranial hemorrhage, mass effect or large acute cortical infarct.    --- End of Report ---      < end of copied text >    Goals of Care Discussion with Family/Proxy/Other   - see note from  12/25     DEVIKA CARBALLO    SCU progress note    INTERVAL HPI/OVERNIGHT EVENTS: ***Amor cath removed this morning at 0600.    DNR [ ]   DNI  [  ]  FULL CODE    Covid - 19 PCR:     The 4Ms    What Matters Most: see GOC  Age appropriate Medications/Screen for High Risk Medication: Yes  Mentation: see CAM below  Mobility: defer to physical exam    The Confusion Assessment Method (CAM) Diagnostic Algorithm     1: Acute Onset or Fluctuating Course  - Is there evidence of an acute change in mental status from the patient’s baseline? Did the (abnormal) behavior  fluctuate during the day, that is, tend to come and go, or increase and decrease in severity?  [ ] YES [x ] NO     2: Inattention  - Did the patient have difficulty focusing attention, being easily distractible, or having difficulty keeping track of what was being said?  [ ] YES [x ] NO     3: Disorganized thinking  -Was the patient’s thinking disorganized or incoherent, such as rambling or irrelevant conversation, unclear or illogical flow of ideas, or unpredictable switching from subject to subject?  [ ] YES [x ] NO    4: Altered Level of consciousness?  [ ] YES [x ] NO    The diagnosis of delirium by CAM requires the presence of features 1 and 2 and either 3 or 4.    PRESSORS: [ ] YES [x ] NO  levoFLOXacin  Tablet 500 milliGRAM(s) Oral every 24 hours    Cardiovascular:  Heart Failure  Acute   Acute on Chronic  Chronic         Pulmonary:  albuterol/ipratropium for Nebulization 3 milliLiter(s) Nebulizer every 6 hours  budesonide 160 MICROgram(s)/formoterol 4.5 MICROgram(s) Inhaler 2 Puff(s) Inhalation two times a day    Hematalogic:  aspirin enteric coated 81 milliGRAM(s) Oral daily  heparin   Injectable 5000 Unit(s) SubCutaneous every 8 hours    Other:  finasteride 5 milliGRAM(s) Oral daily  gabapentin 100 milliGRAM(s) Oral three times a day  glucagon  Injectable 1 milliGRAM(s) IntraMuscular once  insulin glargine Injectable (LANTUS) 20 Unit(s) SubCutaneous at bedtime  insulin lispro (ADMELOG) corrective regimen sliding scale   SubCutaneous three times a day before meals  insulin lispro (ADMELOG) corrective regimen sliding scale   SubCutaneous at bedtime  insulin lispro Injectable (ADMELOG) 7 Unit(s) SubCutaneous three times a day before meals  latanoprost 0.005% Ophthalmic Solution 1 Drop(s) Both EYES at bedtime  lidocaine   4% Patch 1 Patch Transdermal daily  melatonin 5 milliGRAM(s) Oral at bedtime  pantoprazole    Tablet 40 milliGRAM(s) Oral before breakfast  predniSONE   Tablet   Oral   tamsulosin 0.4 milliGRAM(s) Oral at bedtime    albuterol/ipratropium for Nebulization 3 milliLiter(s) Nebulizer every 6 hours  aspirin enteric coated 81 milliGRAM(s) Oral daily  budesonide 160 MICROgram(s)/formoterol 4.5 MICROgram(s) Inhaler 2 Puff(s) Inhalation two times a day  finasteride 5 milliGRAM(s) Oral daily  gabapentin 100 milliGRAM(s) Oral three times a day  glucagon  Injectable 1 milliGRAM(s) IntraMuscular once  heparin   Injectable 5000 Unit(s) SubCutaneous every 8 hours  insulin glargine Injectable (LANTUS) 20 Unit(s) SubCutaneous at bedtime  insulin lispro (ADMELOG) corrective regimen sliding scale   SubCutaneous at bedtime  insulin lispro (ADMELOG) corrective regimen sliding scale   SubCutaneous three times a day before meals  insulin lispro Injectable (ADMELOG) 7 Unit(s) SubCutaneous three times a day before meals  latanoprost 0.005% Ophthalmic Solution 1 Drop(s) Both EYES at bedtime  levoFLOXacin  Tablet 500 milliGRAM(s) Oral every 24 hours  lidocaine   4% Patch 1 Patch Transdermal daily  melatonin 5 milliGRAM(s) Oral at bedtime  pantoprazole    Tablet 40 milliGRAM(s) Oral before breakfast  predniSONE   Tablet   Oral   tamsulosin 0.4 milliGRAM(s) Oral at bedtime    Drug Dosing Weight  Height (cm): 185.4 (06 Oct 2023 18:01)  Weight (kg): 88.7 (27 Dec 2023 07:57)  BMI (kg/m2): 25.8 (27 Dec 2023 07:57)  BSA (m2): 2.13 (27 Dec 2023 07:57)    CENTRAL LINE: [ ] YES [x ] NO  LOCATION:   DATE INSERTED:  REMOVE: [ ] YES [ ] NO  EXPLAIN:    AMOR: [ ] YES [x ] NO    DATE INSERTED:  REMOVE:  [ ] YES [ ] NO  EXPLAIN:    PAST MEDICAL & SURGICAL HISTORY:  COPD (chronic obstructive pulmonary disease)  Oxygen 2-3L at home      Stented coronary artery  2017      HLD (hyperlipidemia)      Pulmonary HTN      Type 2 diabetes mellitus without complication, with long-term current use of insulin      DM (diabetes mellitus)      CAD (coronary artery disease)      GERD (gastroesophageal reflux disease)      Insomnia      CHF (congestive heart failure)      HTN (hypertension)      Mood disorder      No significant past surgical history                  12-28 @ 07:01  -  12-29 @ 07:00  --------------------------------------------------------  IN: 0 mL / OUT: 2000 mL / NET: -2000 mL            PHYSICAL EXAM:    GENERAL: NAD, obese male  HEAD:  Atraumatic, Normocephalic  EYES: EOMI, PERRLA, conjunctiva and sclera clear  ENMT: No tonsillar erythema, exudates  NECK: Supple, No JVD  NERVOUS SYSTEM:  Alert & Oriented X3, Follows commands, moving all extremities.  CHEST/LUNG: Diminished breath sounds bilaterally.  HEART: Regular rate and rhythm; No murmurs, rubs, or gallops  ABDOMEN: Soft, Obese. Nontender, Nondistended; Bowel sounds present  EXTREMITIES:  2+ Peripheral Pulses, No clubbing, cyanosis, or edema  LYMPH: No lymphadenopathy noted  SKIN: No rashes or lesions      LABS:  CBC Full  -  ( 28 Dec 2023 07:25 )  WBC Count : 9.25 K/uL  RBC Count : 4.09 M/uL  Hemoglobin : 11.3 g/dL  Hematocrit : 35.3 %  Platelet Count - Automated : 166 K/uL  Mean Cell Volume : 86.3 fl  Mean Cell Hemoglobin : 27.6 pg  Mean Cell Hemoglobin Concentration : 32.0 gm/dL  Auto Neutrophil # : x  Auto Lymphocyte # : x  Auto Monocyte # : x  Auto Eosinophil # : x  Auto Basophil # : x  Auto Neutrophil % : x  Auto Lymphocyte % : x  Auto Monocyte % : x  Auto Eosinophil % : x  Auto Basophil % : x    12-28    135  |  98  |  17  ----------------------------<  243<H>  3.5   |  33<H>  |  0.61    Ca    8.9      28 Dec 2023 07:25  Phos  3.2     12-28  Mg     2.0     12-28    TPro  5.6<L>  /  Alb  2.6<L>  /  TBili  0.4  /  DBili  x   /  AST  18  /  ALT  42  /  AlkPhos  44  12-28      Urinalysis Basic - ( 28 Dec 2023 07:25 )    Color: x / Appearance: x / SG: x / pH: x  Gluc: 243 mg/dL / Ketone: x  / Bili: x / Urobili: x   Blood: x / Protein: x / Nitrite: x   Leuk Esterase: x / RBC: x / WBC x   Sq Epi: x / Non Sq Epi: x / Bacteria: x            [  ]  DVT Prophylaxis  [  ]  Nutrition, Brand, Rate         Goal Rate        Abnormal Nutritional Status -  Malnutrition   Cachexia      Morbid Obesity BMI >/=40    RADIOLOGY & ADDITIONAL STUDIES:  ***  < from: Xray Chest 1 View- PORTABLE-Urgent (Xray Chest 1 View- PORTABLE-Urgent .) (12.24.23 @ 22:27) >  INTERPRETATION:  Chest radiograph (one view)     CPT 12401    CLINICAL INFORMATION:  Assess ET tube placement.  No additional   information is provided.    TECHNIQUE:  Single frontal view of the chest was obtained.    FINDINGS:  Prior study of earlier the same day was available for review.    The lungs demonstrate patchy bilateral lower lobe airspace opacities.   Small right pleuraleffusion is noted. Endotracheal tube is noted in   satisfactory position above the angela.    The heart and mediastinum appear intact.  Fullness of both hilar regions   is noted.      IMPRESSION: Patchy bilateral lower lobe airspace opacities are noted.   Small right pleural effusion is seen. Endotracheal tube noted in   satisfactory position above the angela.    --- End of Report ---    < end of copied text >  < from: CT Chest No Cont (12.24.23 @ 21:43) >  PROCEDURE:  CT of the Chest was performed without intravenous contrast.  Sagittal and coronal reformats were performed.      FINDINGS: Absence of intravenous contrast limits evaluation of   vasculature and solid organs.    CHEST:  LUNGS AND LARGE AIRWAYS: Patent central airways.  Emphysematous changes.   Patchy opacities in the right middle lobe and bilateral lower lobes with   areas of consolidation.  Continued peribronchial thickening and mucus   impacted airways of the right middle lobe and bilateral lower lobes  PLEURA: No pleural effusion.  VESSELS: Atherosclerotic changes.  HEART: Cardiomegaly. No pericardial effusion. Coronary artery   calcification and/or stenting.  MEDIASTINUM AND [place]: Stable mildly prominent mediastinal and right hilar   lymph nodes.  CHEST WALL AND LOWER NECK: Bilateral gynecomastia.  VISUALIZED UPPER ABDOMEN: Too small to characterize hepatic   hypodensities. Stable splenic hypodense lesion. Unchanged left adrenal   nodule. Partially imaged right renal cyst.  BONES: Degenerative changes.    IMPRESSION:    Patchy opacities in the right middle lobe and bilateral lower lobes with   areas of consolidation. Findings likely infectious/inflammatory or   aspiration related. Pulmonary imaging in 6 weeks advised to demonstrate   resolution.    --- End of Report ---          < end of copied text >  < from: CT Head No Cont (12.24.23 @ 21:42) >  FINDINGS:    There is no intracranial hemorrhage, abnormal extra-axial fluid   collection, mass effect, midline shift or large acute cortical infarct.    Gray-white differentiation is maintained.  There are subcortical and   periventricular white matter lucencies likely representing microvascular   ischemic disease.    The mastoid air cells and visualized paranasal sinuses are well aerated.    IMPRESSION:    No intracranial hemorrhage, mass effect or large acute cortical infarct.    --- End of Report ---      < end of copied text >    Goals of Care Discussion with Family/Proxy/Other   - see note from  12/25

## 2023-12-29 NOTE — PROGRESS NOTE ADULT - NS ATTEND AMEND GEN_ALL_CORE FT
63M with PMH as listed above including COPD (on home oxygen 3L), CAD, CHF presented to the ED with worsening SOB with respiratory distress.. In the ED he was found to be in copd exacerbation and in acute on chronic hypercapnic respiratory failure and he was intubated and placed on mechanical ventilation by the ED team. Soon after intubation he became hypotensive and was resuscitated with IV fluid boluses with NS to at least 4-5Liters.  At time of ICU evaluation, patient orally intubated  on mech vent and peripheral levophed on-going to maintain MAP >65. Patient is accepted to ICU for further management and closer monitoring.     Assessment  Acute on chronic hypercapnic respiratory failure from COPD exacerbation s/p intubation.  Acute exacerbation of COPD.  Hypovolemia from dehydrated state,  Right sided pneumonia  Shock- hypovolemic, cardiogenic   Hypovolemic hyponatremia, mild  COPD  HTN  CAD   CHF w Pulmonary HTN,    Plan:  Uncontrolled hyperglycemia due to dietary non compliance and steroids use   Extubated 12/26   Albuterol/Atrovent nebulizer q6hrs  Bipap at night and as needed, on home bipap support  D/c steroids as completed 5 days and now with uncontrolled hyperglycemia   IV Azithromycin for copd exacerbation x 3 doses  Sputum cultures with Pseudomonas sensitive to Levofloxacin, change to Levofloxacin  Cont.  oral diet  PT evaluation  OOB to chair  Void trial this morning   Cont BPH meds  DVT prophylaxis with Lovenox.  GI prophylaxis with protonix

## 2023-12-29 NOTE — CHART NOTE - NSCHARTNOTEFT_GEN_A_CORE
[place] was not D/C as ordered. Reinforced with RN to D/C [place] now and bladder scan in 6 hours. [place] D/C at 6:30 AM. Bladder scan in 6 hours-14:30 pm.

## 2023-12-29 NOTE — PROGRESS NOTE ADULT - SUBJECTIVE AND OBJECTIVE BOX
Time of Visit:  Patient seen and examined.     MEDICATIONS  (STANDING):  albuterol/ipratropium for Nebulization 3 milliLiter(s) Nebulizer every 6 hours  aspirin enteric coated 81 milliGRAM(s) Oral daily  budesonide 160 MICROgram(s)/formoterol 4.5 MICROgram(s) Inhaler 2 Puff(s) Inhalation two times a day  finasteride 5 milliGRAM(s) Oral daily  gabapentin 100 milliGRAM(s) Oral three times a day  glucagon  Injectable 1 milliGRAM(s) IntraMuscular once  heparin   Injectable 5000 Unit(s) SubCutaneous every 8 hours  insulin glargine Injectable (LANTUS) 20 Unit(s) SubCutaneous at bedtime  insulin lispro (ADMELOG) corrective regimen sliding scale   SubCutaneous three times a day before meals  insulin lispro (ADMELOG) corrective regimen sliding scale   SubCutaneous at bedtime  insulin lispro Injectable (ADMELOG) 10 Unit(s) SubCutaneous three times a day before meals  latanoprost 0.005% Ophthalmic Solution 1 Drop(s) Both EYES at bedtime  levoFLOXacin  Tablet 500 milliGRAM(s) Oral every 24 hours  lidocaine   4% Patch 1 Patch Transdermal daily  melatonin 5 milliGRAM(s) Oral at bedtime  pantoprazole    Tablet 40 milliGRAM(s) Oral before breakfast  tamsulosin 0.4 milliGRAM(s) Oral at bedtime      MEDICATIONS  (PRN):       Medications up to date at time of exam.      PHYSICAL EXAMINATION:  Patient has no new complaints.  GENERAL: The patient is a well-developed, well-nourished, in no apparent distress.     Vital Signs Last 24 Hrs  T(C): 36.4 (29 Dec 2023 13:14), Max: 36.6 (29 Dec 2023 05:11)  T(F): 97.6 (29 Dec 2023 13:14), Max: 97.9 (29 Dec 2023 05:11)  HR: 105 (29 Dec 2023 13:14) (85 - 105)  BP: 123/63 (29 Dec 2023 13:14) (122/75 - 126/83)  BP(mean): 97 (29 Dec 2023 05:11) (97 - 97)  RR: 20 (29 Dec 2023 13:14) (18 - 20)  SpO2: 98% (29 Dec 2023 13:14) (95% - 98%)    Parameters below as of 29 Dec 2023 13:14  Patient On (Oxygen Delivery Method): nasal cannula  O2 Flow (L/min): 3     (if applicable)    Chest Tube (if applicable)    HEENT: Head is normocephalic and atraumatic. Extraocular muscles are intact. Mucous membranes are moist.     NECK: Supple, no palpable adenopathy.    LUNGS: Clear to auscultation, no wheezing, rales, or rhonchi.    HEART: Regular rate and rhythm without murmur.    ABDOMEN: Soft, nontender, and nondistended.  No hepatosplenomegaly is noted.    : No painful voiding, no pelvic pain    EXTREMITIES: Without any cyanosis, clubbing, rash, lesions or edema.    NEUROLOGIC: Awake, alert, oriented, grossly intact    SKIN: Warm, dry, good turgor.      LABS:                        11.3   9.25  )-----------( 166      ( 28 Dec 2023 07:25 )             35.3     12-29    129<L>  |  95<L>  |  23<H>  ----------------------------<  520<HH>  5.1   |  29  |  0.90    Ca    7.9<L>      29 Dec 2023 12:50  Phos  3.2     12-28  Mg     2.0     12-28    TPro  6.5  /  Alb  3.0<L>  /  TBili  0.3  /  DBili  x   /  AST  24  /  ALT  79<H>  /  AlkPhos  58  12-29      Urinalysis Basic - ( 29 Dec 2023 12:50 )    Color: x / Appearance: x / SG: x / pH: x  Gluc: 520 mg/dL / Ketone: x  / Bili: x / Urobili: x   Blood: x / Protein: x / Nitrite: x   Leuk Esterase: x / RBC: x / WBC x   Sq Epi: x / Non Sq Epi: x / Bacteria: x                      MICROBIOLOGY: (if applicable)    RADIOLOGY & ADDITIONAL STUDIES:  EKG:   CXR:  ECHO:    IMPRESSION: 63y Male PAST MEDICAL & SURGICAL HISTORY:  COPD (chronic obstructive pulmonary disease)  Oxygen 2-3L at home      Stented coronary artery  2017      HLD (hyperlipidemia)      Pulmonary HTN      Type 2 diabetes mellitus without complication, with long-term current use of insulin      DM (diabetes mellitus)      CAD (coronary artery disease)      GERD (gastroesophageal reflux disease)      Insomnia      CHF (congestive heart failure)      HTN (hypertension)      Mood disorder      No significant past surgical history       p/w       Impression; This is a 63 yr old man  from [email or web address] who is an active smoker. Has XHF on Nocturnal AVAPS (Trilogy ) but non compliant. Was in ICU, treated for Pneumonia with Rocephin and Azithromycin. Had an episode that time with Lethargy and got intubated , was on Mechanical Ventilator x 1 DAY , extubated on 12-26-23. Admitted with Acute hypoxic hypercapnic respiratory failure secondary to COPD GOLD 4D. Sputum +ve for Pseudomonas Aeruginosa .     Suggestion:  - O2 supp as needed to maintain sat >90%   - Reinforced the importance of smoking cessation and Daily compliance to PAP/NIPPV to prevent hospitalization and reintubation but patient refused to have it at Night .  - Antibx changed to levaquin tablets    - Continue Duoneb via nebulization Q 6 Hours.  - Continue Symbicort 160-4.5 mcg 2 puffs Twice daily.   - DVT / GI prophylactic. On Heparin 5,000 Units SQ Q 8 Hours.    - On Prednisone 40 mg Oral daily.   - Monitor Blood glucose with insulin coverage .       - Mallory d/c , monitor for urine out and possible d/c     discussed with Nichol LEWIS        Time of Visit:  Patient seen and examined.     MEDICATIONS  (STANDING):  albuterol/ipratropium for Nebulization 3 milliLiter(s) Nebulizer every 6 hours  aspirin enteric coated 81 milliGRAM(s) Oral daily  budesonide 160 MICROgram(s)/formoterol 4.5 MICROgram(s) Inhaler 2 Puff(s) Inhalation two times a day  finasteride 5 milliGRAM(s) Oral daily  gabapentin 100 milliGRAM(s) Oral three times a day  glucagon  Injectable 1 milliGRAM(s) IntraMuscular once  heparin   Injectable 5000 Unit(s) SubCutaneous every 8 hours  insulin glargine Injectable (LANTUS) 20 Unit(s) SubCutaneous at bedtime  insulin lispro (ADMELOG) corrective regimen sliding scale   SubCutaneous three times a day before meals  insulin lispro (ADMELOG) corrective regimen sliding scale   SubCutaneous at bedtime  insulin lispro Injectable (ADMELOG) 10 Unit(s) SubCutaneous three times a day before meals  latanoprost 0.005% Ophthalmic Solution 1 Drop(s) Both EYES at bedtime  levoFLOXacin  Tablet 500 milliGRAM(s) Oral every 24 hours  lidocaine   4% Patch 1 Patch Transdermal daily  melatonin 5 milliGRAM(s) Oral at bedtime  pantoprazole    Tablet 40 milliGRAM(s) Oral before breakfast  tamsulosin 0.4 milliGRAM(s) Oral at bedtime      MEDICATIONS  (PRN):       Medications up to date at time of exam.      PHYSICAL EXAMINATION:  Patient has no new complaints.  GENERAL: The patient is a well-developed, well-nourished, in no apparent distress.     Vital Signs Last 24 Hrs  T(C): 36.4 (29 Dec 2023 13:14), Max: 36.6 (29 Dec 2023 05:11)  T(F): 97.6 (29 Dec 2023 13:14), Max: 97.9 (29 Dec 2023 05:11)  HR: 105 (29 Dec 2023 13:14) (85 - 105)  BP: 123/63 (29 Dec 2023 13:14) (122/75 - 126/83)  BP(mean): 97 (29 Dec 2023 05:11) (97 - 97)  RR: 20 (29 Dec 2023 13:14) (18 - 20)  SpO2: 98% (29 Dec 2023 13:14) (95% - 98%)    Parameters below as of 29 Dec 2023 13:14  Patient On (Oxygen Delivery Method): nasal cannula  O2 Flow (L/min): 3     (if applicable)    Chest Tube (if applicable)    HEENT: Head is normocephalic and atraumatic. Extraocular muscles are intact. Mucous membranes are moist.     NECK: Supple, no palpable adenopathy.    LUNGS: Clear to auscultation, no wheezing, rales, or rhonchi.    HEART: Regular rate and rhythm without murmur.    ABDOMEN: Soft, nontender, and nondistended.  No hepatosplenomegaly is noted.    : No painful voiding, no pelvic pain    EXTREMITIES: Without any cyanosis, clubbing, rash, lesions or edema.    NEUROLOGIC: Awake, alert, oriented, grossly intact    SKIN: Warm, dry, good turgor.      LABS:                        11.3   9.25  )-----------( 166      ( 28 Dec 2023 07:25 )             35.3     12-29    129<L>  |  95<L>  |  23<H>  ----------------------------<  520<HH>  5.1   |  29  |  0.90    Ca    7.9<L>      29 Dec 2023 12:50  Phos  3.2     12-28  Mg     2.0     12-28    TPro  6.5  /  Alb  3.0<L>  /  TBili  0.3  /  DBili  x   /  AST  24  /  ALT  79<H>  /  AlkPhos  58  12-29      Urinalysis Basic - ( 29 Dec 2023 12:50 )    Color: x / Appearance: x / SG: x / pH: x  Gluc: 520 mg/dL / Ketone: x  / Bili: x / Urobili: x   Blood: x / Protein: x / Nitrite: x   Leuk Esterase: x / RBC: x / WBC x   Sq Epi: x / Non Sq Epi: x / Bacteria: x                      MICROBIOLOGY: (if applicable)    RADIOLOGY & ADDITIONAL STUDIES:  EKG:   CXR:  ECHO:    IMPRESSION: 63y Male PAST MEDICAL & SURGICAL HISTORY:  COPD (chronic obstructive pulmonary disease)  Oxygen 2-3L at home      Stented coronary artery  2017      HLD (hyperlipidemia)      Pulmonary HTN      Type 2 diabetes mellitus without complication, with long-term current use of insulin      DM (diabetes mellitus)      CAD (coronary artery disease)      GERD (gastroesophageal reflux disease)      Insomnia      CHF (congestive heart failure)      HTN (hypertension)      Mood disorder      No significant past surgical history       p/w       Impression; This is a 63 yr old man  from [facility] who is an active smoker. Has XHF on Nocturnal AVAPS (Trilogy ) but non compliant. Was in ICU, treated for Pneumonia with Rocephin and Azithromycin. Had an episode that time with Lethargy and got intubated , was on Mechanical Ventilator x 1 DAY , extubated on 12-26-23. Admitted with Acute hypoxic hypercapnic respiratory failure secondary to COPD GOLD 4D. Sputum +ve for Pseudomonas Aeruginosa .     Suggestion:  - O2 supp as needed to maintain sat >90%   - Reinforced the importance of smoking cessation and Daily compliance to PAP/NIPPV to prevent hospitalization and reintubation but patient refused to have it at Night .  - Antibx changed to levaquin tablets    - Continue Duoneb via nebulization Q 6 Hours.  - Continue Symbicort 160-4.5 mcg 2 puffs Twice daily.   - DVT / GI prophylactic. On Heparin 5,000 Units SQ Q 8 Hours.    - On Prednisone 40 mg Oral daily.   - Monitor Blood glucose with insulin coverage .       - Mallory d/c , monitor for urine out and possible d/c     discussed with Nichol LEWIS

## 2023-12-30 LAB
ALBUMIN SERPL ELPH-MCNC: 2.8 G/DL — LOW (ref 3.5–5)
ALBUMIN SERPL ELPH-MCNC: 2.8 G/DL — LOW (ref 3.5–5)
ALP SERPL-CCNC: 45 U/L — SIGNIFICANT CHANGE UP (ref 40–120)
ALP SERPL-CCNC: 45 U/L — SIGNIFICANT CHANGE UP (ref 40–120)
ALT FLD-CCNC: 58 U/L DA — SIGNIFICANT CHANGE UP (ref 10–60)
ALT FLD-CCNC: 58 U/L DA — SIGNIFICANT CHANGE UP (ref 10–60)
ANION GAP SERPL CALC-SCNC: 3 MMOL/L — LOW (ref 5–17)
ANION GAP SERPL CALC-SCNC: 3 MMOL/L — LOW (ref 5–17)
AST SERPL-CCNC: 18 U/L — SIGNIFICANT CHANGE UP (ref 10–40)
AST SERPL-CCNC: 18 U/L — SIGNIFICANT CHANGE UP (ref 10–40)
BILIRUB SERPL-MCNC: 0.2 MG/DL — SIGNIFICANT CHANGE UP (ref 0.2–1.2)
BILIRUB SERPL-MCNC: 0.2 MG/DL — SIGNIFICANT CHANGE UP (ref 0.2–1.2)
BUN SERPL-MCNC: 14 MG/DL — SIGNIFICANT CHANGE UP (ref 7–18)
BUN SERPL-MCNC: 14 MG/DL — SIGNIFICANT CHANGE UP (ref 7–18)
CALCIUM SERPL-MCNC: 7.9 MG/DL — LOW (ref 8.4–10.5)
CALCIUM SERPL-MCNC: 7.9 MG/DL — LOW (ref 8.4–10.5)
CHLORIDE SERPL-SCNC: 98 MMOL/L — SIGNIFICANT CHANGE UP (ref 96–108)
CHLORIDE SERPL-SCNC: 98 MMOL/L — SIGNIFICANT CHANGE UP (ref 96–108)
CO2 SERPL-SCNC: 34 MMOL/L — HIGH (ref 22–31)
CO2 SERPL-SCNC: 34 MMOL/L — HIGH (ref 22–31)
CREAT SERPL-MCNC: 0.71 MG/DL — SIGNIFICANT CHANGE UP (ref 0.5–1.3)
CREAT SERPL-MCNC: 0.71 MG/DL — SIGNIFICANT CHANGE UP (ref 0.5–1.3)
CULTURE RESULTS: SIGNIFICANT CHANGE UP
EGFR: 104 ML/MIN/1.73M2 — SIGNIFICANT CHANGE UP
EGFR: 104 ML/MIN/1.73M2 — SIGNIFICANT CHANGE UP
GLUCOSE SERPL-MCNC: 239 MG/DL — HIGH (ref 70–99)
GLUCOSE SERPL-MCNC: 239 MG/DL — HIGH (ref 70–99)
PHOSPHATE SERPL-MCNC: 3.9 MG/DL — SIGNIFICANT CHANGE UP (ref 2.5–4.5)
PHOSPHATE SERPL-MCNC: 3.9 MG/DL — SIGNIFICANT CHANGE UP (ref 2.5–4.5)
POTASSIUM SERPL-MCNC: 3.9 MMOL/L — SIGNIFICANT CHANGE UP (ref 3.5–5.3)
POTASSIUM SERPL-MCNC: 3.9 MMOL/L — SIGNIFICANT CHANGE UP (ref 3.5–5.3)
POTASSIUM SERPL-SCNC: 3.9 MMOL/L — SIGNIFICANT CHANGE UP (ref 3.5–5.3)
POTASSIUM SERPL-SCNC: 3.9 MMOL/L — SIGNIFICANT CHANGE UP (ref 3.5–5.3)
PROT SERPL-MCNC: 5.6 G/DL — LOW (ref 6–8.3)
PROT SERPL-MCNC: 5.6 G/DL — LOW (ref 6–8.3)
SODIUM SERPL-SCNC: 135 MMOL/L — SIGNIFICANT CHANGE UP (ref 135–145)
SODIUM SERPL-SCNC: 135 MMOL/L — SIGNIFICANT CHANGE UP (ref 135–145)
SPECIMEN SOURCE: SIGNIFICANT CHANGE UP

## 2023-12-30 RX ORDER — INSULIN GLARGINE 100 [IU]/ML
26 INJECTION, SOLUTION SUBCUTANEOUS AT BEDTIME
Refills: 0 | Status: DISCONTINUED | OUTPATIENT
Start: 2023-12-30 | End: 2024-01-02

## 2023-12-30 RX ADMIN — Medication 3 MILLILITER(S): at 20:14

## 2023-12-30 RX ADMIN — HEPARIN SODIUM 5000 UNIT(S): 5000 INJECTION INTRAVENOUS; SUBCUTANEOUS at 21:58

## 2023-12-30 RX ADMIN — Medication 3 MILLILITER(S): at 15:14

## 2023-12-30 RX ADMIN — Medication 4: at 12:01

## 2023-12-30 RX ADMIN — Medication 4: at 16:43

## 2023-12-30 RX ADMIN — FINASTERIDE 5 MILLIGRAM(S): 5 TABLET, FILM COATED ORAL at 12:02

## 2023-12-30 RX ADMIN — Medication 10 UNIT(S): at 08:38

## 2023-12-30 RX ADMIN — GABAPENTIN 100 MILLIGRAM(S): 400 CAPSULE ORAL at 13:19

## 2023-12-30 RX ADMIN — LATANOPROST 1 DROP(S): 0.05 SOLUTION/ DROPS OPHTHALMIC; TOPICAL at 21:59

## 2023-12-30 RX ADMIN — HEPARIN SODIUM 5000 UNIT(S): 5000 INJECTION INTRAVENOUS; SUBCUTANEOUS at 07:07

## 2023-12-30 RX ADMIN — HEPARIN SODIUM 5000 UNIT(S): 5000 INJECTION INTRAVENOUS; SUBCUTANEOUS at 13:19

## 2023-12-30 RX ADMIN — Medication 10 UNIT(S): at 12:01

## 2023-12-30 RX ADMIN — PANTOPRAZOLE SODIUM 40 MILLIGRAM(S): 20 TABLET, DELAYED RELEASE ORAL at 07:06

## 2023-12-30 RX ADMIN — GABAPENTIN 100 MILLIGRAM(S): 400 CAPSULE ORAL at 07:07

## 2023-12-30 RX ADMIN — Medication 10 UNIT(S): at 16:44

## 2023-12-30 RX ADMIN — Medication 81 MILLIGRAM(S): at 12:02

## 2023-12-30 RX ADMIN — Medication 5 MILLIGRAM(S): at 21:58

## 2023-12-30 RX ADMIN — Medication 3 MILLILITER(S): at 08:30

## 2023-12-30 RX ADMIN — GABAPENTIN 100 MILLIGRAM(S): 400 CAPSULE ORAL at 21:58

## 2023-12-30 RX ADMIN — INSULIN GLARGINE 26 UNIT(S): 100 INJECTION, SOLUTION SUBCUTANEOUS at 22:03

## 2023-12-30 RX ADMIN — Medication 4: at 08:37

## 2023-12-30 RX ADMIN — TAMSULOSIN HYDROCHLORIDE 0.4 MILLIGRAM(S): 0.4 CAPSULE ORAL at 21:58

## 2023-12-30 NOTE — PROGRESS NOTE ADULT - ASSESSMENT
64 yo male from [facility], active smoker, with a PMH of CAD, CHF on nocturnal AVAPS-AE (Trilogy), COPD ( MULTIPLE intubations in past on 3L of home O2), peripheral neuropathy, GERD, HTN, HLD, obesity, polyarthritis, Pulmonary HTN, TIA, Vit D Def, presents with hypoxemia from nursing facility and lethargy. Upon evaluation in ED, patient afebrile, BP of 126/58, tachycardic to 115bpm, hypoxic placed on 15L NRBM saturating 97%. ABG showing ph of 7.17, CO2 of 145. Patient noted to be lethargic and subsequently intubated. Physical examination noted for dry mucus membranes, Tejada in place immediately draining 2L clear urine. Lungs clear to auscultation. Labs significant for no leukocytosis, lactate 0.9, negative cardiac enzymes and UA negative for infection. CT head negative for any acute intracranial processes. CT chest showing patchy opacities in the right middle lobe and bilateral lower lobes with areas of consolidation suspicious for infectious etiology. EKG sinus tachycardia 115bpm with RBBB with no acute ischemic changes. Patient admitted to ICU for Acute on chronic hypoxic hypercapnic respiratory failure 2/2 Suspected COPD exacerbation.   <ICU course>  Patient when admitted to ICU, treated for PNA with Rocephin and azithromycin. Patient remained on mechanical ventilator for 1 day. Hypercapnia resolved and the next day 12/26 patient was extubated. Tejada removed and patient was found to be retaining urine straight cath was done resulting in >700 cc of urine. Patient found to be retaining and Tejada was replaced. Blood cultures Blood and urine cultures are negative to date. Sputum cultures noted to be growing pseudomonas. Legionella negative. Patient completed azithromycin and was started on prednisone taper.  PT recommended home PT. Patient refusing nocturnal BiPAP.          64 yo male from [facility], active smoker, with a PMH of CAD, CHF on nocturnal AVAPS-AE (Trilogy), COPD ( MULTIPLE intubations in past on 3L of home O2), peripheral neuropathy, GERD, HTN, HLD, obesity, polyarthritis, Pulmonary HTN, TIA, Vit D Def, presents with hypoxemia from nursing facility and lethargy. Upon evaluation in ED, patient afebrile, BP of 126/58, tachycardic to 115bpm, hypoxic placed on 15L NRBM saturating 97%. ABG showing ph of 7.17, CO2 of 145. Patient noted to be lethargic and subsequently intubated. Physical examination noted for dry mucus membranes, Tejada in place immediately draining 2L clear urine. Lungs clear to auscultation. Labs significant for no leukocytosis, lactate 0.9, negative cardiac enzymes and UA negative for infection. CT head negative for any acute intracranial processes. CT chest showing patchy opacities in the right middle lobe and bilateral lower lobes with areas of consolidation suspicious for infectious etiology. EKG sinus tachycardia 115bpm with RBBB with no acute ischemic changes. Patient admitted to ICU for Acute on chronic hypoxic hypercapnic respiratory failure 2/2 Suspected COPD exacerbation.   <ICU course>  Patient when admitted to ICU, treated for PNA with Rocephin and azithromycin. Patient remained on mechanical ventilator for 1 day. Hypercapnia resolved and the next day 12/26 patient was extubated. Tejada removed and patient was found to be retaining urine straight cath was done resulting in >700 cc of urine. Patient found to be retaining and Tejada was replaced. Blood cultures Blood and urine cultures are negative to date. Sputum cultures noted to be growing pseudomonas. Legionella negative. Patient completed azithromycin and was started on prednisone taper.  PT recommended home PT. Patient refusing nocturnal BiPAP.

## 2023-12-30 NOTE — PROGRESS NOTE ADULT - SUBJECTIVE AND OBJECTIVE BOX
DEVIKA CARBALLO    SCU progress note    INTERVAL HPI/OVERNIGHT EVENTS: ***Glucose better controlled today.    DNR [ ]   DNI  [  ]   FULL CODE    Covid - 19 PCR:     The 4Ms    What Matters Most: see GOC  Age appropriate Medications/Screen for High Risk Medication: Yes  Mentation: see CAM below  Mobility: defer to physical exam    The Confusion Assessment Method (CAM) Diagnostic Algorithm     1: Acute Onset or Fluctuating Course  - Is there evidence of an acute change in mental status from the patient’s baseline? Did the (abnormal) behavior  fluctuate during the day, that is, tend to come and go, or increase and decrease in severity?  [ ] YES [x ] NO     2: Inattention  - Did the patient have difficulty focusing attention, being easily distractible, or having difficulty keeping track of what was being said?  [ ] YES [x ] NO     3: Disorganized thinking  -Was the patient’s thinking disorganized or incoherent, such as rambling or irrelevant conversation, unclear or illogical flow of ideas, or unpredictable switching from subject to subject?  [ ] YES [x ] NO    4: Altered Level of consciousness?  [ ] YES [x ] NO    The diagnosis of delirium by CAM requires the presence of features 1 and 2 and either 3 or 4.    PRESSORS: [ ] YES [x ] NO  levoFLOXacin  Tablet 500 milliGRAM(s) Oral every 24 hours    Cardiovascular:  Heart Failure  Acute   Acute on Chronic  Chronic         Pulmonary:  albuterol/ipratropium for Nebulization 3 milliLiter(s) Nebulizer every 6 hours  budesonide 160 MICROgram(s)/formoterol 4.5 MICROgram(s) Inhaler 2 Puff(s) Inhalation two times a day    Hematalogic:  aspirin enteric coated 81 milliGRAM(s) Oral daily  heparin   Injectable 5000 Unit(s) SubCutaneous every 8 hours    Other:  finasteride 5 milliGRAM(s) Oral daily  gabapentin 100 milliGRAM(s) Oral three times a day  glucagon  Injectable 1 milliGRAM(s) IntraMuscular once  insulin glargine Injectable (LANTUS) 20 Unit(s) SubCutaneous at bedtime  insulin lispro (ADMELOG) corrective regimen sliding scale   SubCutaneous three times a day before meals  insulin lispro (ADMELOG) corrective regimen sliding scale   SubCutaneous at bedtime  insulin lispro Injectable (ADMELOG) 10 Unit(s) SubCutaneous three times a day before meals  latanoprost 0.005% Ophthalmic Solution 1 Drop(s) Both EYES at bedtime  lidocaine   4% Patch 1 Patch Transdermal daily  melatonin 5 milliGRAM(s) Oral at bedtime  oxycodone    5 mG/acetaminophen 325 mG 1 Tablet(s) Oral every 6 hours PRN  pantoprazole    Tablet 40 milliGRAM(s) Oral before breakfast  tamsulosin 0.4 milliGRAM(s) Oral at bedtime    albuterol/ipratropium for Nebulization 3 milliLiter(s) Nebulizer every 6 hours  aspirin enteric coated 81 milliGRAM(s) Oral daily  budesonide 160 MICROgram(s)/formoterol 4.5 MICROgram(s) Inhaler 2 Puff(s) Inhalation two times a day  finasteride 5 milliGRAM(s) Oral daily  gabapentin 100 milliGRAM(s) Oral three times a day  glucagon  Injectable 1 milliGRAM(s) IntraMuscular once  heparin   Injectable 5000 Unit(s) SubCutaneous every 8 hours  insulin glargine Injectable (LANTUS) 20 Unit(s) SubCutaneous at bedtime  insulin lispro (ADMELOG) corrective regimen sliding scale   SubCutaneous three times a day before meals  insulin lispro (ADMELOG) corrective regimen sliding scale   SubCutaneous at bedtime  insulin lispro Injectable (ADMELOG) 10 Unit(s) SubCutaneous three times a day before meals  latanoprost 0.005% Ophthalmic Solution 1 Drop(s) Both EYES at bedtime  levoFLOXacin  Tablet 500 milliGRAM(s) Oral every 24 hours  lidocaine   4% Patch 1 Patch Transdermal daily  melatonin 5 milliGRAM(s) Oral at bedtime  oxycodone    5 mG/acetaminophen 325 mG 1 Tablet(s) Oral every 6 hours PRN  pantoprazole    Tablet 40 milliGRAM(s) Oral before breakfast  tamsulosin 0.4 milliGRAM(s) Oral at bedtime    Drug Dosing Weight  Height (cm): 185.4 (06 Oct 2023 18:01)  Weight (kg): 88.7 (27 Dec 2023 07:57)  BMI (kg/m2): 25.8 (27 Dec 2023 07:57)  BSA (m2): 2.13 (27 Dec 2023 07:57)    CENTRAL LINE: [ ] YES [x ] NO  LOCATION:   DATE INSERTED:  REMOVE: [ ] YES [ ] NO  EXPLAIN:    AMOR: [ ] YES [x ] NO    DATE INSERTED:  REMOVE:  [ ] YES [ ] NO  EXPLAIN:    PAST MEDICAL & SURGICAL HISTORY:  COPD (chronic obstructive pulmonary disease)  Oxygen 2-3L at home      Stented coronary artery  2017      HLD (hyperlipidemia)      Pulmonary HTN      Type 2 diabetes mellitus without complication, with long-term current use of insulin      DM (diabetes mellitus)      CAD (coronary artery disease)      GERD (gastroesophageal reflux disease)      Insomnia      CHF (congestive heart failure)      HTN (hypertension)      Mood disorder      No significant past surgical history                  12-29 @ 07:01  -  12-30 @ 07:00  --------------------------------------------------------  IN: 720 mL / OUT: 3000 mL / NET: -2280 mL            PHYSICAL EXAM:    GENERAL: NAD, well-groomed, well-developed  HEAD:  Atraumatic, Normocephalic  EYES: EOMI, PERRLA, conjunctiva and sclera clear  ENMT: No tonsillar erythema, exudates  NECK: Supple, No JVD  NERVOUS SYSTEM:  Alert & Oriented X3, follows commands, moving all extremities.  CHEST/LUNG: Diminished bilateral breath sounds with scattered rhonchi noted.  HEART: Regular rate and rhythm; No murmurs, rubs, or gallops  ABDOMEN: Soft, Obese, Nontender, Nondistended; Bowel sounds present  EXTREMITIES:  2+ Peripheral Pulses, No clubbing, cyanosis, or edema  LYMPH: No lymphadenopathy noted  SKIN: No rashes or lesions      LABS:    12-30    135  |  98  |  14  ----------------------------<  239<H>  3.9   |  34<H>  |  0.71    Ca    7.9<L>      30 Dec 2023 06:56  Phos  3.9     12-30    TPro  5.6<L>  /  Alb  2.8<L>  /  TBili  0.2  /  DBili  x   /  AST  18  /  ALT  58  /  AlkPhos  45  12-30      Urinalysis Basic - ( 30 Dec 2023 06:56 )    Color: x / Appearance: x / SG: x / pH: x  Gluc: 239 mg/dL / Ketone: x  / Bili: x / Urobili: x   Blood: x / Protein: x / Nitrite: x   Leuk Esterase: x / RBC: x / WBC x   Sq Epi: x / Non Sq Epi: x / Bacteria: x            [  ]  DVT Prophylaxis  [  ]  Nutrition, Brand, Rate         Goal Rate        Abnormal Nutritional Status -  Malnutrition   Cachexia      Morbid Obesity BMI >/=40    RADIOLOGY & ADDITIONAL STUDIES:  ***  < from: Xray Chest 1 View- PORTABLE-Urgent (Xray Chest 1 View- PORTABLE-Urgent .) (12.24.23 @ 22:27) >  TECHNIQUE:  Single frontal view of the chest was obtained.    FINDINGS:  Prior study of earlier the same day was available for review.    The lungs demonstrate patchy bilateral lower lobe airspace opacities.   Small right pleuraleffusion is noted. Endotracheal tube is noted in   satisfactory position above the angela.    The heart and mediastinum appear intact.  Fullness of both hilar regions   is noted.      IMPRESSION: Patchy bilateral lower lobe airspace opacities are noted.   Small right pleural effusion is seen. Endotracheal tube noted in   satisfactory position above the angela.    --- End of Report ---      < end of copied text >  < from: CT Chest No Cont (12.24.23 @ 21:43) >  FINDINGS: Absence of intravenous contrast limits evaluation of   vasculature and solid organs.    CHEST:  LUNGS AND LARGE AIRWAYS: Patent central airways.  Emphysematous changes.   Patchy opacities in the right middle lobe and bilateral lower lobes with   areas of consolidation.  Continued peribronchial thickening and mucus   impacted airways of the right middle lobe and bilateral lower lobes  PLEURA: No pleural effusion.  VESSELS: Atherosclerotic changes.  HEART: Cardiomegaly. No pericardial effusion. Coronary artery   calcification and/or stenting.  MEDIASTINUM AND [place]: Stable mildly prominent mediastinal and right hilar   lymph nodes.  CHEST WALL AND LOWER NECK: Bilateral gynecomastia.  VISUALIZED UPPER ABDOMEN: Too small to characterize hepatic   hypodensities. Stable splenic hypodense lesion. Unchanged left adrenal   nodule. Partially imaged right renal cyst.  BONES: Degenerative changes.    IMPRESSION:    Patchy opacities in the right middle lobe and bilateral lower lobes with   areas of consolidation. Findings likely infectious/inflammatory or   aspiration related. Pulmonary imaging in 6 weeks advised to demonstrate   resolution.    --- End of Report ---        < end of copied text >  < from: CT Head No Cont (12.24.23 @ 21:42) >  FINDINGS:    There is no intracranial hemorrhage, abnormal extra-axial fluid   collection, mass effect, midline shift or large acute cortical infarct.    Gray-white differentiation is maintained.  There are subcortical and   periventricular white matter lucencies likely representing microvascular   ischemic disease.    The mastoid air cells and visualized paranasal sinuses are well aerated.    IMPRESSION:    No intracranial hemorrhage, mass effect or large acute cortical infarct.    --- End of Report ---        < end of copied text >    Goals of Care Discussion with Family/Proxy/Other   - see note from 12/25

## 2023-12-30 NOTE — PROGRESS NOTE ADULT - NS ATTEND AMEND GEN_ALL_CORE FT
63M with PMH as listed above including COPD (on home oxygen 3L), CAD, CHF presented to the ED with worsening SOB with respiratory distress.. In the ED he was found to be in copd exacerbation and in acute on chronic hypercapnic respiratory failure and he was intubated and placed on mechanical ventilation by the ED team. Soon after intubation he became hypotensive and was resuscitated with IV fluid boluses with NS to at least 4-5Liters.  At time of ICU evaluation, patient orally intubated  on mech vent and peripheral levophed on-going to maintain MAP >65. Patient is accepted to ICU for further management and closer monitoring.     Assessment  Acute on chronic hypercapnic respiratory failure from COPD exacerbation s/p intubation.  Acute exacerbation of COPD.  Hypovolemia from dehydrated state,  Right sided pneumonia  Shock- hypovolemic, cardiogenic   Hypovolemic hyponatremia, mild  COPD  HTN  CAD   CHF w Pulmonary HTN,    Plan:  Glucose better controlled off steroids  Extubated 12/26   Albuterol/Atrovent nebulizer q6hrs  Bipap at night and as needed, on home bipap support  Off steroids  IV Azithromycin for copd exacerbation x 3 doses  Sputum cultures with Pseudomonas sensitive to Levofloxacin  Cont.  oral diet  PT evaluation  OOB to chair  Cont BPH meds  DVT prophylaxis with Lovenox.  GI prophylaxis with Protonix  Dispo planning back to facility

## 2023-12-30 NOTE — PROGRESS NOTE ADULT - PROBLEM SELECTOR PLAN 5
Continue 20U lantus HS  Continue lispro to 10U before meals.  Moderate sliding scale coverage.  A1c elevated 9.2 upon admission.

## 2023-12-30 NOTE — PROGRESS NOTE ADULT - PROBLEM SELECTOR PLAN 8
DVT and GI prophylaxis.  Continue oxygen support.  Monitor oxygen saturation.  Refusing to use BIPAP.  Sputum positive for pseudomonas.  Continue antibiotics  FULL CODE.  Medically stable for discharge.

## 2023-12-31 LAB
ALBUMIN SERPL ELPH-MCNC: 3 G/DL — LOW (ref 3.5–5)
ALBUMIN SERPL ELPH-MCNC: 3 G/DL — LOW (ref 3.5–5)
ALP SERPL-CCNC: 47 U/L — SIGNIFICANT CHANGE UP (ref 40–120)
ALP SERPL-CCNC: 47 U/L — SIGNIFICANT CHANGE UP (ref 40–120)
ALT FLD-CCNC: 55 U/L DA — SIGNIFICANT CHANGE UP (ref 10–60)
ALT FLD-CCNC: 55 U/L DA — SIGNIFICANT CHANGE UP (ref 10–60)
ANION GAP SERPL CALC-SCNC: 4 MMOL/L — LOW (ref 5–17)
ANION GAP SERPL CALC-SCNC: 4 MMOL/L — LOW (ref 5–17)
AST SERPL-CCNC: 21 U/L — SIGNIFICANT CHANGE UP (ref 10–40)
AST SERPL-CCNC: 21 U/L — SIGNIFICANT CHANGE UP (ref 10–40)
BILIRUB SERPL-MCNC: 0.2 MG/DL — SIGNIFICANT CHANGE UP (ref 0.2–1.2)
BILIRUB SERPL-MCNC: 0.2 MG/DL — SIGNIFICANT CHANGE UP (ref 0.2–1.2)
BUN SERPL-MCNC: 23 MG/DL — HIGH (ref 7–18)
BUN SERPL-MCNC: 23 MG/DL — HIGH (ref 7–18)
CALCIUM SERPL-MCNC: 8.8 MG/DL — SIGNIFICANT CHANGE UP (ref 8.4–10.5)
CALCIUM SERPL-MCNC: 8.8 MG/DL — SIGNIFICANT CHANGE UP (ref 8.4–10.5)
CHLORIDE SERPL-SCNC: 97 MMOL/L — SIGNIFICANT CHANGE UP (ref 96–108)
CHLORIDE SERPL-SCNC: 97 MMOL/L — SIGNIFICANT CHANGE UP (ref 96–108)
CO2 SERPL-SCNC: 31 MMOL/L — SIGNIFICANT CHANGE UP (ref 22–31)
CO2 SERPL-SCNC: 31 MMOL/L — SIGNIFICANT CHANGE UP (ref 22–31)
CREAT SERPL-MCNC: 0.68 MG/DL — SIGNIFICANT CHANGE UP (ref 0.5–1.3)
CREAT SERPL-MCNC: 0.68 MG/DL — SIGNIFICANT CHANGE UP (ref 0.5–1.3)
EGFR: 106 ML/MIN/1.73M2 — SIGNIFICANT CHANGE UP
EGFR: 106 ML/MIN/1.73M2 — SIGNIFICANT CHANGE UP
GLUCOSE SERPL-MCNC: 213 MG/DL — HIGH (ref 70–99)
GLUCOSE SERPL-MCNC: 213 MG/DL — HIGH (ref 70–99)
HCT VFR BLD CALC: 38.2 % — LOW (ref 39–50)
HCT VFR BLD CALC: 38.2 % — LOW (ref 39–50)
HGB BLD-MCNC: 12 G/DL — LOW (ref 13–17)
HGB BLD-MCNC: 12 G/DL — LOW (ref 13–17)
MAGNESIUM SERPL-MCNC: 1.9 MG/DL — SIGNIFICANT CHANGE UP (ref 1.6–2.6)
MAGNESIUM SERPL-MCNC: 1.9 MG/DL — SIGNIFICANT CHANGE UP (ref 1.6–2.6)
MCHC RBC-ENTMCNC: 27.8 PG — SIGNIFICANT CHANGE UP (ref 27–34)
MCHC RBC-ENTMCNC: 27.8 PG — SIGNIFICANT CHANGE UP (ref 27–34)
MCHC RBC-ENTMCNC: 31.4 GM/DL — LOW (ref 32–36)
MCHC RBC-ENTMCNC: 31.4 GM/DL — LOW (ref 32–36)
MCV RBC AUTO: 88.6 FL — SIGNIFICANT CHANGE UP (ref 80–100)
MCV RBC AUTO: 88.6 FL — SIGNIFICANT CHANGE UP (ref 80–100)
NRBC # BLD: 0 /100 WBCS — SIGNIFICANT CHANGE UP (ref 0–0)
NRBC # BLD: 0 /100 WBCS — SIGNIFICANT CHANGE UP (ref 0–0)
PHOSPHATE SERPL-MCNC: 3.4 MG/DL — SIGNIFICANT CHANGE UP (ref 2.5–4.5)
PHOSPHATE SERPL-MCNC: 3.4 MG/DL — SIGNIFICANT CHANGE UP (ref 2.5–4.5)
PLATELET # BLD AUTO: 175 K/UL — SIGNIFICANT CHANGE UP (ref 150–400)
PLATELET # BLD AUTO: 175 K/UL — SIGNIFICANT CHANGE UP (ref 150–400)
POTASSIUM SERPL-MCNC: 4.5 MMOL/L — SIGNIFICANT CHANGE UP (ref 3.5–5.3)
POTASSIUM SERPL-MCNC: 4.5 MMOL/L — SIGNIFICANT CHANGE UP (ref 3.5–5.3)
POTASSIUM SERPL-SCNC: 4.5 MMOL/L — SIGNIFICANT CHANGE UP (ref 3.5–5.3)
POTASSIUM SERPL-SCNC: 4.5 MMOL/L — SIGNIFICANT CHANGE UP (ref 3.5–5.3)
PROT SERPL-MCNC: 5.8 G/DL — LOW (ref 6–8.3)
PROT SERPL-MCNC: 5.8 G/DL — LOW (ref 6–8.3)
RBC # BLD: 4.31 M/UL — SIGNIFICANT CHANGE UP (ref 4.2–5.8)
RBC # BLD: 4.31 M/UL — SIGNIFICANT CHANGE UP (ref 4.2–5.8)
RBC # FLD: 13.9 % — SIGNIFICANT CHANGE UP (ref 10.3–14.5)
RBC # FLD: 13.9 % — SIGNIFICANT CHANGE UP (ref 10.3–14.5)
SODIUM SERPL-SCNC: 132 MMOL/L — LOW (ref 135–145)
SODIUM SERPL-SCNC: 132 MMOL/L — LOW (ref 135–145)
WBC # BLD: 11.02 K/UL — HIGH (ref 3.8–10.5)
WBC # BLD: 11.02 K/UL — HIGH (ref 3.8–10.5)
WBC # FLD AUTO: 11.02 K/UL — HIGH (ref 3.8–10.5)
WBC # FLD AUTO: 11.02 K/UL — HIGH (ref 3.8–10.5)

## 2023-12-31 RX ADMIN — Medication 3 MILLILITER(S): at 08:59

## 2023-12-31 RX ADMIN — Medication 10 UNIT(S): at 17:31

## 2023-12-31 RX ADMIN — Medication 2: at 17:30

## 2023-12-31 RX ADMIN — GABAPENTIN 100 MILLIGRAM(S): 400 CAPSULE ORAL at 21:54

## 2023-12-31 RX ADMIN — FINASTERIDE 5 MILLIGRAM(S): 5 TABLET, FILM COATED ORAL at 12:13

## 2023-12-31 RX ADMIN — Medication 10 UNIT(S): at 12:12

## 2023-12-31 RX ADMIN — INSULIN GLARGINE 26 UNIT(S): 100 INJECTION, SOLUTION SUBCUTANEOUS at 21:53

## 2023-12-31 RX ADMIN — PANTOPRAZOLE SODIUM 40 MILLIGRAM(S): 20 TABLET, DELAYED RELEASE ORAL at 06:28

## 2023-12-31 RX ADMIN — HEPARIN SODIUM 5000 UNIT(S): 5000 INJECTION INTRAVENOUS; SUBCUTANEOUS at 14:54

## 2023-12-31 RX ADMIN — Medication 3 MILLILITER(S): at 15:01

## 2023-12-31 RX ADMIN — LATANOPROST 1 DROP(S): 0.05 SOLUTION/ DROPS OPHTHALMIC; TOPICAL at 21:54

## 2023-12-31 RX ADMIN — Medication 10 UNIT(S): at 07:57

## 2023-12-31 RX ADMIN — HEPARIN SODIUM 5000 UNIT(S): 5000 INJECTION INTRAVENOUS; SUBCUTANEOUS at 21:54

## 2023-12-31 RX ADMIN — Medication 2: at 07:57

## 2023-12-31 RX ADMIN — Medication 5 MILLIGRAM(S): at 21:54

## 2023-12-31 RX ADMIN — TAMSULOSIN HYDROCHLORIDE 0.4 MILLIGRAM(S): 0.4 CAPSULE ORAL at 21:54

## 2023-12-31 RX ADMIN — HEPARIN SODIUM 5000 UNIT(S): 5000 INJECTION INTRAVENOUS; SUBCUTANEOUS at 06:28

## 2023-12-31 RX ADMIN — GABAPENTIN 100 MILLIGRAM(S): 400 CAPSULE ORAL at 14:54

## 2023-12-31 RX ADMIN — GABAPENTIN 100 MILLIGRAM(S): 400 CAPSULE ORAL at 06:28

## 2023-12-31 RX ADMIN — Medication 8: at 12:12

## 2023-12-31 RX ADMIN — Medication 3 MILLILITER(S): at 20:00

## 2023-12-31 RX ADMIN — Medication 81 MILLIGRAM(S): at 12:13

## 2023-12-31 NOTE — PROGRESS NOTE ADULT - ASSESSMENT
64 yo male from [facility], active smoker, with a PMH of CAD, CHF on nocturnal AVAPS-AE (Trilogy), COPD ( MULTIPLE intubations in past on 3L of home O2), peripheral neuropathy, GERD, HTN, HLD, obesity, polyarthritis, Pulmonary HTN, TIA, Vit D Def, presents with hypoxemia from nursing facility and lethargy. Upon evaluation in ED, patient afebrile, BP of 126/58, tachycardic to 115bpm, hypoxic placed on 15L NRBM saturating 97%. ABG showing ph of 7.17, CO2 of 145. Patient noted to be lethargic and subsequently intubated. Physical examination noted for dry mucus membranes, Tejada in place immediately draining 2L clear urine. Lungs clear to auscultation. Labs significant for no leukocytosis, lactate 0.9, negative cardiac enzymes and UA negative for infection. CT head negative for any acute intracranial processes. CT chest showing patchy opacities in the right middle lobe and bilateral lower lobes with areas of consolidation suspicious for infectious etiology. EKG sinus tachycardia 115bpm with RBBB with no acute ischemic changes. Patient admitted to ICU for Acute on chronic hypoxic hypercapnic respiratory failure 2/2 Suspected COPD exacerbation.   <ICU course>  Patient when admitted to ICU, treated for PNA with Rocephin and azithromycin. Patient remained on mechanical ventilator for 1 day. Hypercapnia resolved and the next day 12/26 patient was extubated. Tejada removed and patient was found to be retaining urine straight cath was done resulting in >700 cc of urine. Patient found to be retaining and Tejada was replaced. Blood cultures Blood and urine cultures are negative to date. Sputum cultures noted to be growing pseudomonas. Legionella negative. Patient completed azithromycin and was started on prednisone taper.  PT recommended home PT. Patient refusing nocturnal BiPAP.          62 yo male from [facility], active smoker, with a PMH of CAD, CHF on nocturnal AVAPS-AE (Trilogy), COPD ( MULTIPLE intubations in past on 3L of home O2), peripheral neuropathy, GERD, HTN, HLD, obesity, polyarthritis, Pulmonary HTN, TIA, Vit D Def, presents with hypoxemia from nursing facility and lethargy. Upon evaluation in ED, patient afebrile, BP of 126/58, tachycardic to 115bpm, hypoxic placed on 15L NRBM saturating 97%. ABG showing ph of 7.17, CO2 of 145. Patient noted to be lethargic and subsequently intubated. Physical examination noted for dry mucus membranes, Tejada in place immediately draining 2L clear urine. Lungs clear to auscultation. Labs significant for no leukocytosis, lactate 0.9, negative cardiac enzymes and UA negative for infection. CT head negative for any acute intracranial processes. CT chest showing patchy opacities in the right middle lobe and bilateral lower lobes with areas of consolidation suspicious for infectious etiology. EKG sinus tachycardia 115bpm with RBBB with no acute ischemic changes. Patient admitted to ICU for Acute on chronic hypoxic hypercapnic respiratory failure 2/2 Suspected COPD exacerbation.   <ICU course>  Patient when admitted to ICU, treated for PNA with Rocephin and azithromycin. Patient remained on mechanical ventilator for 1 day. Hypercapnia resolved and the next day 12/26 patient was extubated. Tejada removed and patient was found to be retaining urine straight cath was done resulting in >700 cc of urine. Patient found to be retaining and Tejada was replaced. Blood cultures Blood and urine cultures are negative to date. Sputum cultures noted to be growing pseudomonas. Legionella negative. Patient completed azithromycin and was started on prednisone taper.  PT recommended home PT. Patient refusing nocturnal BiPAP.

## 2023-12-31 NOTE — PROGRESS NOTE ADULT - SUBJECTIVE AND OBJECTIVE BOX
DEVIKA CARBALLO    SCU progress note    INTERVAL HPI/OVERNIGHT EVENTS: No new overnight events.  Seen and examined at bedside.     DNR [  ]   DNI  [  ]  FULL CODE [ x ]    The 4Ms    What Matters Most: see GOC  Age appropriate Medications/Screen for High Risk Medication: Yes  Mentation: see CAM below  Mobility: defer to physical exam    The Confusion Assessment Method (CAM) Diagnostic Algorithm     1: Acute Onset or Fluctuating Course  - Is there evidence of an acute change in mental status from the patient’s baseline? Did the (abnormal) behavior  fluctuate during the day, that is, tend to come and go, or increase and decrease in severity?  [  ] YES [ x ] NO     2: Inattention  - Did the patient have difficulty focusing attention, being easily distractible, or having difficulty keeping track of what was being said?  [  ] YES [ x ] NO     3: Disorganized thinking  -Was the patient’s thinking disorganized or incoherent, such as rambling or irrelevant conversation, unclear or illogical flow of ideas, or unpredictable switching from subject to subject?  [  ] YES [ x ] NO    4: Altered Level of consciousness?  [  ] YES [ x ] NO    The diagnosis of delirium by CAM requires the presence of features 1 and 2 and either 3 or 4.    levoFLOXacin  Tablet 500 milliGRAM(s) Oral every 24 hours    Cardiovascular:  Heart Failure  Acute   Acute on Chronic  Chronic         Pulmonary:  albuterol/ipratropium for Nebulization 3 milliLiter(s) Nebulizer every 6 hours  budesonide 160 MICROgram(s)/formoterol 4.5 MICROgram(s) Inhaler 2 Puff(s) Inhalation two times a day    Hematalogic:  aspirin enteric coated 81 milliGRAM(s) Oral daily  heparin   Injectable 5000 Unit(s) SubCutaneous every 8 hours    Other:  finasteride 5 milliGRAM(s) Oral daily  gabapentin 100 milliGRAM(s) Oral three times a day  glucagon  Injectable 1 milliGRAM(s) IntraMuscular once  insulin glargine Injectable (LANTUS) 26 Unit(s) SubCutaneous at bedtime  insulin lispro (ADMELOG) corrective regimen sliding scale   SubCutaneous at bedtime  insulin lispro (ADMELOG) corrective regimen sliding scale   SubCutaneous three times a day before meals  insulin lispro Injectable (ADMELOG) 10 Unit(s) SubCutaneous three times a day before meals  latanoprost 0.005% Ophthalmic Solution 1 Drop(s) Both EYES at bedtime  lidocaine   4% Patch 1 Patch Transdermal daily  melatonin 5 milliGRAM(s) Oral at bedtime  oxycodone    5 mG/acetaminophen 325 mG 1 Tablet(s) Oral every 6 hours PRN  pantoprazole    Tablet 40 milliGRAM(s) Oral before breakfast  tamsulosin 0.4 milliGRAM(s) Oral at bedtime    albuterol/ipratropium for Nebulization 3 milliLiter(s) Nebulizer every 6 hours  aspirin enteric coated 81 milliGRAM(s) Oral daily  budesonide 160 MICROgram(s)/formoterol 4.5 MICROgram(s) Inhaler 2 Puff(s) Inhalation two times a day  finasteride 5 milliGRAM(s) Oral daily  gabapentin 100 milliGRAM(s) Oral three times a day  glucagon  Injectable 1 milliGRAM(s) IntraMuscular once  heparin   Injectable 5000 Unit(s) SubCutaneous every 8 hours  insulin glargine Injectable (LANTUS) 26 Unit(s) SubCutaneous at bedtime  insulin lispro (ADMELOG) corrective regimen sliding scale   SubCutaneous at bedtime  insulin lispro (ADMELOG) corrective regimen sliding scale   SubCutaneous three times a day before meals  insulin lispro Injectable (ADMELOG) 10 Unit(s) SubCutaneous three times a day before meals  latanoprost 0.005% Ophthalmic Solution 1 Drop(s) Both EYES at bedtime  levoFLOXacin  Tablet 500 milliGRAM(s) Oral every 24 hours  lidocaine   4% Patch 1 Patch Transdermal daily  melatonin 5 milliGRAM(s) Oral at bedtime  oxycodone    5 mG/acetaminophen 325 mG 1 Tablet(s) Oral every 6 hours PRN  pantoprazole    Tablet 40 milliGRAM(s) Oral before breakfast  tamsulosin 0.4 milliGRAM(s) Oral at bedtime    Drug Dosing Weight  Height (cm): 185.4 (06 Oct 2023 18:01)  Weight (kg): 88.7 (27 Dec 2023 07:57)  BMI (kg/m2): 25.8 (27 Dec 2023 07:57)  BSA (m2): 2.13 (27 Dec 2023 07:57)    CENTRAL LINE: [  ] YES [ x ] NO  LOCATION:   DATE INSERTED:  REMOVE: [  ] YES [  ] NO  EXPLAIN:    AMOR: [  ] YES [ x ] NO  DATE INSERTED:  REMOVE:  [  ] YES [  ] NO  EXPLAIN:    PAST MEDICAL & SURGICAL HISTORY:  COPD (chronic obstructive pulmonary disease)  Oxygen 2-3L at home      Stented coronary artery  2017      HLD (hyperlipidemia)      Pulmonary HTN      Type 2 diabetes mellitus without complication, with long-term current use of insulin      DM (diabetes mellitus)      CAD (coronary artery disease)      GERD (gastroesophageal reflux disease)      Insomnia      CHF (congestive heart failure)      HTN (hypertension)      Mood disorder      No significant past surgical history                  12-30 @ 07:01  -  12-31 @ 07:00  --------------------------------------------------------  IN: 0 mL / OUT: 850 mL / NET: -850 mL            PHYSICAL EXAM:    GENERAL: NAD, well-groomed, well-developed  HEAD:  Atraumatic, Normocephalic  EYES: PERRLA, conjunctiva and sclera clear  ENMT: No tonsillar erythema, or exudates  NECK: Supple, No JVD  NERVOUS SYSTEM:  Alert & Oriented, Good concentration; MIRAMONTES  CHEST/LUNG: diminished bilaterally; No rales, rhonchi, wheezing, or rubs  HEART: Regular rate and rhythm; No murmurs, rubs, or gallops  ABDOMEN: Soft, Nontender, Nondistended; Bowel sounds present  EXTREMITIES:  + Peripheral Pulses, No clubbing, cyanosis, or edema  LYMPH: No lymphadenopathy noted  SKIN: Warm and dry; No rashes or lesions      LABS:  CBC Full  -  ( 31 Dec 2023 07:02 )  WBC Count : 11.02 K/uL  RBC Count : 4.31 M/uL  Hemoglobin : 12.0 g/dL  Hematocrit : 38.2 %  Platelet Count - Automated : 175 K/uL  Mean Cell Volume : 88.6 fl  Mean Cell Hemoglobin : 27.8 pg  Mean Cell Hemoglobin Concentration : 31.4 gm/dL  Auto Neutrophil # : x  Auto Lymphocyte # : x  Auto Monocyte # : x  Auto Eosinophil # : x  Auto Basophil # : x  Auto Neutrophil % : x  Auto Lymphocyte % : x  Auto Monocyte % : x  Auto Eosinophil % : x  Auto Basophil % : x    12-31    132<L>  |  97  |  23<H>  ----------------------------<  213<H>  4.5   |  31  |  0.68    Ca    8.8      31 Dec 2023 07:02  Phos  3.4     12-31  Mg     1.9     12-31    TPro  5.8<L>  /  Alb  3.0<L>  /  TBili  0.2  /  DBili  x   /  AST  21  /  ALT  55  /  AlkPhos  47  12-31      Urinalysis Basic - ( 31 Dec 2023 07:02 )    Color: x / Appearance: x / SG: x / pH: x  Gluc: 213 mg/dL / Ketone: x  / Bili: x / Urobili: x   Blood: x / Protein: x / Nitrite: x   Leuk Esterase: x / RBC: x / WBC x   Sq Epi: x / Non Sq Epi: x / Bacteria: x            [  ]  DVT Prophylaxis  [  ]  Nutrition,  Brand, Rate  (  Goal Rate)          Abnormal Nutritional Status -  Malnutrition   Cachexia      Morbid Obesity BMI >/=40    RADIOLOGY & ADDITIONAL STUDIES:       < from: Xray Chest 1 View- PORTABLE-Urgent (Xray Chest 1 View- PORTABLE-Urgent .) (12.24.23 @ 22:27) >  IMPRESSION: Patchy bilateral lower lobe airspace opacities are noted.   Small right pleural effusion is seen. Endotracheal tube noted in   satisfactory position above the angela.    < end of copied text >    Goals of Care Discussion with Family/Proxy/Other

## 2023-12-31 NOTE — PROGRESS NOTE ADULT - NS ATTEND AMEND GEN_ALL_CORE FT
63M with PMH as listed above including COPD (on home oxygen 3L), CAD, CHF presented to the ED with worsening SOB with respiratory distress.. In the ED he was found to be in copd exacerbation and in acute on chronic hypercapnic respiratory failure and he was intubated and placed on mechanical ventilation by the ED team. Soon after intubation he became hypotensive and was resuscitated with IV fluid boluses with NS to at least 4-5Liters.  At time of ICU evaluation, patient orally intubated  on mech vent and peripheral levophed on-going to maintain MAP >65. Patient is accepted to ICU for further management and closer monitoring.     Assessment  Acute on chronic hypercapnic respiratory failure from COPD exacerbation s/p intubation.  Acute exacerbation of COPD.  Hypovolemia from dehydrated state,  Right sided pneumonia  Shock- hypovolemic, cardiogenic   Hypovolemic hyponatremia, mild  COPD  HTN  CAD   CHF w Pulmonary HTN,    Plan:  Glucose better controlled off steroids  Extubated 12/26   Albuterol/Atrovent nebulizer q6hrs  Bipap at night and as needed, on home bipap support  Off steroids  IV Azithromycin for copd exacerbation x 3 doses  Sputum cultures with Pseudomonas sensitive to Levofloxacin  Cont. Symbicort  Cont.  oral diet  PT evaluation  OOB to chair  Cont BPH meds  DVT prophylaxis with Lovenox.  GI prophylaxis with Protonix  Dispo planning back to facility

## 2024-01-01 RX ORDER — BUDESONIDE AND FORMOTEROL FUMARATE DIHYDRATE 160; 4.5 UG/1; UG/1
2 AEROSOL RESPIRATORY (INHALATION)
Refills: 0 | Status: DISCONTINUED | OUTPATIENT
Start: 2024-01-01 | End: 2024-01-02

## 2024-01-01 RX ADMIN — HEPARIN SODIUM 5000 UNIT(S): 5000 INJECTION INTRAVENOUS; SUBCUTANEOUS at 22:09

## 2024-01-01 RX ADMIN — FINASTERIDE 5 MILLIGRAM(S): 5 TABLET, FILM COATED ORAL at 12:12

## 2024-01-01 RX ADMIN — Medication 10 UNIT(S): at 12:15

## 2024-01-01 RX ADMIN — GABAPENTIN 100 MILLIGRAM(S): 400 CAPSULE ORAL at 06:22

## 2024-01-01 RX ADMIN — Medication 2: at 17:08

## 2024-01-01 RX ADMIN — GABAPENTIN 100 MILLIGRAM(S): 400 CAPSULE ORAL at 22:09

## 2024-01-01 RX ADMIN — Medication 6: at 08:25

## 2024-01-01 RX ADMIN — HEPARIN SODIUM 5000 UNIT(S): 5000 INJECTION INTRAVENOUS; SUBCUTANEOUS at 13:53

## 2024-01-01 RX ADMIN — Medication 5 MILLIGRAM(S): at 22:09

## 2024-01-01 RX ADMIN — LATANOPROST 1 DROP(S): 0.05 SOLUTION/ DROPS OPHTHALMIC; TOPICAL at 22:09

## 2024-01-01 RX ADMIN — Medication 10 UNIT(S): at 17:08

## 2024-01-01 RX ADMIN — TAMSULOSIN HYDROCHLORIDE 0.4 MILLIGRAM(S): 0.4 CAPSULE ORAL at 22:09

## 2024-01-01 RX ADMIN — Medication 10 UNIT(S): at 08:26

## 2024-01-01 RX ADMIN — PANTOPRAZOLE SODIUM 40 MILLIGRAM(S): 20 TABLET, DELAYED RELEASE ORAL at 06:22

## 2024-01-01 RX ADMIN — Medication 4: at 12:14

## 2024-01-01 RX ADMIN — GABAPENTIN 100 MILLIGRAM(S): 400 CAPSULE ORAL at 13:53

## 2024-01-01 RX ADMIN — Medication 3 MILLILITER(S): at 15:42

## 2024-01-01 RX ADMIN — HEPARIN SODIUM 5000 UNIT(S): 5000 INJECTION INTRAVENOUS; SUBCUTANEOUS at 06:22

## 2024-01-01 RX ADMIN — Medication 3 MILLILITER(S): at 09:07

## 2024-01-01 RX ADMIN — Medication 3 MILLILITER(S): at 20:33

## 2024-01-01 RX ADMIN — Medication 81 MILLIGRAM(S): at 12:12

## 2024-01-01 RX ADMIN — Medication 3 MILLILITER(S): at 03:11

## 2024-01-01 RX ADMIN — INSULIN GLARGINE 26 UNIT(S): 100 INJECTION, SOLUTION SUBCUTANEOUS at 22:42

## 2024-01-01 NOTE — PROGRESS NOTE ADULT - ASSESSMENT
64 yo male from [facility], active smoker, with a PMH of CAD, CHF on nocturnal AVAPS-AE (Trilogy), COPD ( MULTIPLE intubations in past on 3L of home O2), peripheral neuropathy, GERD, HTN, HLD, obesity, polyarthritis, Pulmonary HTN, TIA, Vit D Def, presents with hypoxemia from nursing facility and lethargy. Upon evaluation in ED, patient afebrile, BP of 126/58, tachycardic to 115bpm, hypoxic placed on 15L NRBM saturating 97%. ABG showing ph of 7.17, CO2 of 145. Patient noted to be lethargic and subsequently intubated. Physical examination noted for dry mucus membranes, Tejada in place immediately draining 2L clear urine. Lungs clear to auscultation. Labs significant for no leukocytosis, lactate 0.9, negative cardiac enzymes and UA negative for infection. CT head negative for any acute intracranial processes. CT chest showing patchy opacities in the right middle lobe and bilateral lower lobes with areas of consolidation suspicious for infectious etiology. EKG sinus tachycardia 115bpm with RBBB with no acute ischemic changes. Patient admitted to ICU for Acute on chronic hypoxic hypercapnic respiratory failure 2/2 Suspected COPD exacerbation.   <ICU course>  Patient when admitted to ICU, treated for PNA with Rocephin and azithromycin. Patient remained on mechanical ventilator for 1 day. Hypercapnia resolved and the next day 12/26 patient was extubated. Tejada removed and patient was found to be retaining urine straight cath was done resulting in >700 cc of urine. Patient found to be retaining and Tejada was replaced. Blood cultures Blood and urine cultures are negative to date. Sputum cultures noted to be growing pseudomonas. Legionella negative. Patient completed azithromycin and was started on prednisone taper.  PT recommended home PT. Patient refusing nocturnal BiPAP.    64 yo male from [facility], active smoker, with a PMH of CAD, CHF on nocturnal AVAPS-AE (Trilogy), COPD ( MULTIPLE intubations in past on 3L of home O2), peripheral neuropathy, GERD, HTN, HLD, obesity, polyarthritis, Pulmonary HTN, TIA, Vit D Def, presents with hypoxemia from nursing facility and lethargy. Upon evaluation in ED, patient afebrile, BP of 126/58, tachycardic to 115bpm, hypoxic placed on 15L NRBM saturating 97%. ABG showing ph of 7.17, CO2 of 145. Patient noted to be lethargic and subsequently intubated. Physical examination noted for dry mucus membranes, Tejada in place immediately draining 2L clear urine. Lungs clear to auscultation. Labs significant for no leukocytosis, lactate 0.9, negative cardiac enzymes and UA negative for infection. CT head negative for any acute intracranial processes. CT chest showing patchy opacities in the right middle lobe and bilateral lower lobes with areas of consolidation suspicious for infectious etiology. EKG sinus tachycardia 115bpm with RBBB with no acute ischemic changes. Patient admitted to ICU for Acute on chronic hypoxic hypercapnic respiratory failure 2/2 Suspected COPD exacerbation.   <ICU course>  Patient when admitted to ICU, treated for PNA with Rocephin and azithromycin. Patient remained on mechanical ventilator for 1 day. Hypercapnia resolved and the next day 12/26 patient was extubated. Tejada removed and patient was found to be retaining urine straight cath was done resulting in >700 cc of urine. Patient found to be retaining and Tejada was replaced. Blood cultures Blood and urine cultures are negative to date. Sputum cultures noted to be growing pseudomonas. Legionella negative. Patient completed azithromycin and was started on prednisone taper.  PT recommended home PT. Patient refusing nocturnal BiPAP.

## 2024-01-01 NOTE — PROGRESS NOTE ADULT - SUBJECTIVE AND OBJECTIVE BOX
DEVIKA CARBALLO    SCU progress note    INTERVAL HPI/OVERNIGHT EVENTS: ***No overnight evnts.    DNR [ ]   DNI  [  ]  FULL CODE    Covid - 19 PCR:     The 4Ms    What Matters Most: see GOC  Age appropriate Medications/Screen for High Risk Medication: Yes  Mentation: see CAM below  Mobility: defer to physical exam    The Confusion Assessment Method (CAM) Diagnostic Algorithm     1: Acute Onset or Fluctuating Course  - Is there evidence of an acute change in mental status from the patient’s baseline? Did the (abnormal) behavior  fluctuate during the day, that is, tend to come and go, or increase and decrease in severity?  [ ] YES [x ] NO     2: Inattention  - Did the patient have difficulty focusing attention, being easily distractible, or having difficulty keeping track of what was being said?  [ ] YES [x ] NO     3: Disorganized thinking  -Was the patient’s thinking disorganized or incoherent, such as rambling or irrelevant conversation, unclear or illogical flow of ideas, or unpredictable switching from subject to subject?  [ ] YES [x ] NO    4: Altered Level of consciousness?  [ ] YES [x ] NO    The diagnosis of delirium by CAM requires the presence of features 1 and 2 and either 3 or 4.    PRESSORS: [ ] YES [x ] NO  levoFLOXacin  Tablet 500 milliGRAM(s) Oral every 24 hours    Cardiovascular:  Heart Failure  Acute   Acute on Chronic  Chronic         Pulmonary:  albuterol/ipratropium for Nebulization 3 milliLiter(s) Nebulizer every 6 hours  budesonide 160 MICROgram(s)/formoterol 4.5 MICROgram(s) Inhaler 2 Puff(s) Inhalation two times a day    Hematalogic:  aspirin enteric coated 81 milliGRAM(s) Oral daily  heparin   Injectable 5000 Unit(s) SubCutaneous every 8 hours    Other:  finasteride 5 milliGRAM(s) Oral daily  gabapentin 100 milliGRAM(s) Oral three times a day  glucagon  Injectable 1 milliGRAM(s) IntraMuscular once  insulin glargine Injectable (LANTUS) 26 Unit(s) SubCutaneous at bedtime  insulin lispro (ADMELOG) corrective regimen sliding scale   SubCutaneous at bedtime  insulin lispro (ADMELOG) corrective regimen sliding scale   SubCutaneous three times a day before meals  insulin lispro Injectable (ADMELOG) 10 Unit(s) SubCutaneous three times a day before meals  latanoprost 0.005% Ophthalmic Solution 1 Drop(s) Both EYES at bedtime  lidocaine   4% Patch 1 Patch Transdermal daily  melatonin 5 milliGRAM(s) Oral at bedtime  oxycodone    5 mG/acetaminophen 325 mG 1 Tablet(s) Oral every 6 hours PRN  pantoprazole    Tablet 40 milliGRAM(s) Oral before breakfast  tamsulosin 0.4 milliGRAM(s) Oral at bedtime    albuterol/ipratropium for Nebulization 3 milliLiter(s) Nebulizer every 6 hours  aspirin enteric coated 81 milliGRAM(s) Oral daily  budesonide 160 MICROgram(s)/formoterol 4.5 MICROgram(s) Inhaler 2 Puff(s) Inhalation two times a day  finasteride 5 milliGRAM(s) Oral daily  gabapentin 100 milliGRAM(s) Oral three times a day  glucagon  Injectable 1 milliGRAM(s) IntraMuscular once  heparin   Injectable 5000 Unit(s) SubCutaneous every 8 hours  insulin glargine Injectable (LANTUS) 26 Unit(s) SubCutaneous at bedtime  insulin lispro (ADMELOG) corrective regimen sliding scale   SubCutaneous at bedtime  insulin lispro (ADMELOG) corrective regimen sliding scale   SubCutaneous three times a day before meals  insulin lispro Injectable (ADMELOG) 10 Unit(s) SubCutaneous three times a day before meals  latanoprost 0.005% Ophthalmic Solution 1 Drop(s) Both EYES at bedtime  levoFLOXacin  Tablet 500 milliGRAM(s) Oral every 24 hours  lidocaine   4% Patch 1 Patch Transdermal daily  melatonin 5 milliGRAM(s) Oral at bedtime  oxycodone    5 mG/acetaminophen 325 mG 1 Tablet(s) Oral every 6 hours PRN  pantoprazole    Tablet 40 milliGRAM(s) Oral before breakfast  tamsulosin 0.4 milliGRAM(s) Oral at bedtime    Drug Dosing Weight  Height (cm): 185.4 (06 Oct 2023 18:01)  Weight (kg): 88.7 (27 Dec 2023 07:57)  BMI (kg/m2): 25.8 (27 Dec 2023 07:57)  BSA (m2): 2.13 (27 Dec 2023 07:57)    CENTRAL LINE: [ ] YES [x ] NO  LOCATION:   DATE INSERTED:  REMOVE: [ ] YES [ ] NO  EXPLAIN:    AMOR: [ ] YES [x ] NO    DATE INSERTED:  REMOVE:  [ ] YES [ ] NO  EXPLAIN:    PAST MEDICAL & SURGICAL HISTORY:  COPD (chronic obstructive pulmonary disease)  Oxygen 2-3L at home      Stented coronary artery  2017      HLD (hyperlipidemia)      Pulmonary HTN      Type 2 diabetes mellitus without complication, with long-term current use of insulin      DM (diabetes mellitus)      CAD (coronary artery disease)      GERD (gastroesophageal reflux disease)      Insomnia      CHF (congestive heart failure)      HTN (hypertension)      Mood disorder      No significant past surgical history                  12-31 @ 07:01  -  01-01 @ 07:00  --------------------------------------------------------  IN: 0 mL / OUT: 1750 mL / NET: -1750 mL            PHYSICAL EXAM:    GENERAL: NAD, well-groomed, well-developed  HEAD:  Atraumatic, Normocephalic  EYES: EOMI, PERRLA, conjunctiva and sclera clear  ENMT: No tonsillar erythema, exudates  NECK: Supple, No JVD  NERVOUS SYSTEM:  Alert & Oriented X3, Follows commands, moving all extremities.  CHEST/LUNG: Diminished bilateral breath sounds with scattered rhonchi  HEART: Regular rate and rhythm; No murmurs, rubs, or gallops  ABDOMEN: Soft, Obese, Nontender, Nondistended; Bowel sounds present  EXTREMITIES:  2+ Peripheral Pulses, No clubbing, cyanosis, or edema  LYMPH: No lymphadenopathy noted  SKIN: No rashes or lesions      LABS:  CBC Full  -  ( 31 Dec 2023 07:02 )  WBC Count : 11.02 K/uL  RBC Count : 4.31 M/uL  Hemoglobin : 12.0 g/dL  Hematocrit : 38.2 %  Platelet Count - Automated : 175 K/uL  Mean Cell Volume : 88.6 fl  Mean Cell Hemoglobin : 27.8 pg  Mean Cell Hemoglobin Concentration : 31.4 gm/dL  Auto Neutrophil # : x  Auto Lymphocyte # : x  Auto Monocyte # : x  Auto Eosinophil # : x  Auto Basophil # : x  Auto Neutrophil % : x  Auto Lymphocyte % : x  Auto Monocyte % : x  Auto Eosinophil % : x  Auto Basophil % : x    12-31    132<L>  |  97  |  23<H>  ----------------------------<  213<H>  4.5   |  31  |  0.68    Ca    8.8      31 Dec 2023 07:02  Phos  3.4     12-31  Mg     1.9     12-31    TPro  5.8<L>  /  Alb  3.0<L>  /  TBili  0.2  /  DBili  x   /  AST  21  /  ALT  55  /  AlkPhos  47  12-31      Urinalysis Basic - ( 31 Dec 2023 07:02 )    Color: x / Appearance: x / SG: x / pH: x  Gluc: 213 mg/dL / Ketone: x  / Bili: x / Urobili: x   Blood: x / Protein: x / Nitrite: x   Leuk Esterase: x / RBC: x / WBC x   Sq Epi: x / Non Sq Epi: x / Bacteria: x            [  ]  DVT Prophylaxis  [  ]  Nutrition, Brand, Rate         Goal Rate        Abnormal Nutritional Status -  Malnutrition   Cachexia      Morbid Obesity BMI >/=40    RADIOLOGY & ADDITIONAL STUDIES:  ***  < from: Xray Chest 1 View- PORTABLE-Urgent (Xray Chest 1 View- PORTABLE-Urgent .) (12.24.23 @ 22:27) >  TECHNIQUE:  Single frontal view of the chest was obtained.    FINDINGS:  Prior study of earlier the same day was available for review.    The lungs demonstrate patchy bilateral lower lobe airspace opacities.   Small right pleuraleffusion is noted. Endotracheal tube is noted in   satisfactory position above the angela.    The heart and mediastinum appear intact.  Fullness of both hilar regions   is noted.      IMPRESSION: Patchy bilateral lower lobe airspace opacities are noted.   Small right pleural effusion is seen. Endotracheal tube noted in   satisfactory position above the angela.    < end of copied text >  < from: CT Chest No Cont (12.24.23 @ 21:43) >  PROCEDURE:  CT of the Chest was performed without intravenous contrast.  Sagittal and coronal reformats were performed.      FINDINGS: Absence of intravenous contrast limits evaluation of   vasculature and solid organs.    CHEST:  LUNGS AND LARGE AIRWAYS: Patent central airways.  Emphysematous changes.   Patchy opacities in the right middle lobe and bilateral lower lobes with   areas of consolidation.  Continued peribronchial thickening and mucus   impacted airways of the right middle lobe and bilateral lower lobes  PLEURA: No pleural effusion.  VESSELS: Atherosclerotic changes.  HEART: Cardiomegaly. No pericardial effusion. Coronary artery   calcification and/or stenting.  MEDIASTINUM AND [place]: Stable mildly prominent mediastinal and right hilar   lymph nodes.  < from: CT Head No Cont (12.24.23 @ 21:42) >  FINDINGS:    There is no intracranial hemorrhage, abnormal extra-axial fluid   collection, mass effect, midline shift or large acute cortical infarct.    Gray-white differentiation is maintained.  There are subcortical and   periventricular white matter lucencies likely representing microvascular   ischemic disease.    The mastoid air cells and visualized paranasal sinuses are well aerated.    IMPRESSION:    No intracranial hemorrhage, mass effect or large acute cortical infarct.    --- End of Report ---    < end of copied text >  CHEST WALL AND LOWER NECK: Bilateral gynecomastia.  VISUALIZED UPPER ABDOMEN: Too small to characterize hepatic   hypodensities. Stable splenic hypodense lesion. Unchanged left adrenal   nodule. Partially imaged right renal cyst.  BONES: Degenerative changes.    IMPRESSION:    Patchy opacities in the right middle lobe and bilateral lower lobes with   areas of consolidation. Findings likely infectious/inflammatory or   aspiration related. Pulmonary imaging in 6 weeks advised to demonstrate   resolution.    --- End of Report ---    < end of copied text >    Goals of Care Discussion with Family/Proxy/Other   - see note from 12/25/23

## 2024-01-01 NOTE — PROGRESS NOTE ADULT - NS ATTEND AMEND GEN_ALL_CORE FT
Problem/Plan - 1:  ·  Problem: Acute on chronic respiratory failure with hypoxia and hypercapnia.   ·  Plan: Likely secondary to pna and COPD  Intubated upon admission. Extubated 12/26 and started on BIPAP.  Continue oxygen support. Refusing to use BIPAP.  Continue to monitor oxygen saturation.  Bronchodilators and ICS.  Prednisone completed.  Continue levaquin for pna thru 1/04.     Problem/Plan - 2:  ·  Problem: Acute metabolic encephalopathy.   ·  Plan: Likely secondary to COPD and hypercapnia.  Resolved.  CT head negative for any acute infarct, bleed or midline shift.  Maintain safety measures.     Problem/Plan - 3:  ·  Problem: Pneumonia due to Pseudomonas.   ·  Plan: Sputum positive for pseudomonas aeruginosa.  Antibiotics switched to levaquin.  Continue antibiotics thru 1/04  CT chest shoes patchy opacities RML and bilateral lower lobes.  Continue oxygen support.  Extubated on 12/26.     Problem/Plan - 4:  ·  Problem: End stage COPD.   ·  Plan: GOLD 4D  Continue oxygen support.  Patient uses 2-4LPM at AL  Noncompliant with Trilogy at AL  Continue bronchodilators and ICS.  Continue antibiotics. Azithromycin completed  Sputum positive for pseudomonas aeruginosa. F/U sensitivities.

## 2024-01-01 NOTE — PROGRESS NOTE ADULT - PROBLEM SELECTOR PLAN 8
DVT and GI prophylaxis.  Continue oxygen support.  Monitor oxygen saturation.  Refusing to use BIPAP.  Sputum positive for pseudomonas.  Continue antibiotics until 1/04/24  FULL CODE.  Medically stable for discharge.

## 2024-01-01 NOTE — PROGRESS NOTE ADULT - PROBLEM SELECTOR PLAN 5
Uncontrolled.  Increase lantus to 30U HS  Continue lispro  Moderate sliding scale.  Continue to monitor glucose.

## 2024-01-02 ENCOUNTER — TRANSCRIPTION ENCOUNTER (OUTPATIENT)
Age: 64
End: 2024-01-02

## 2024-01-02 VITALS
OXYGEN SATURATION: 94 % | RESPIRATION RATE: 18 BRPM | DIASTOLIC BLOOD PRESSURE: 72 MMHG | TEMPERATURE: 98 F | HEART RATE: 94 BPM | SYSTOLIC BLOOD PRESSURE: 118 MMHG

## 2024-01-02 LAB
SARS-COV-2 RNA SPEC QL NAA+PROBE: SIGNIFICANT CHANGE UP
SARS-COV-2 RNA SPEC QL NAA+PROBE: SIGNIFICANT CHANGE UP

## 2024-01-02 PROCEDURE — 94002 VENT MGMT INPAT INIT DAY: CPT

## 2024-01-02 PROCEDURE — 70450 CT HEAD/BRAIN W/O DYE: CPT | Mod: MA

## 2024-01-02 PROCEDURE — 96375 TX/PRO/DX INJ NEW DRUG ADDON: CPT

## 2024-01-02 PROCEDURE — 71250 CT THORAX DX C-: CPT | Mod: MA

## 2024-01-02 PROCEDURE — 85730 THROMBOPLASTIN TIME PARTIAL: CPT

## 2024-01-02 PROCEDURE — 81001 URINALYSIS AUTO W/SCOPE: CPT

## 2024-01-02 PROCEDURE — 80048 BASIC METABOLIC PNL TOTAL CA: CPT

## 2024-01-02 PROCEDURE — 94660 CPAP INITIATION&MGMT: CPT

## 2024-01-02 PROCEDURE — 97530 THERAPEUTIC ACTIVITIES: CPT

## 2024-01-02 PROCEDURE — 80053 COMPREHEN METABOLIC PANEL: CPT

## 2024-01-02 PROCEDURE — 0225U NFCT DS DNA&RNA 21 SARSCOV2: CPT

## 2024-01-02 PROCEDURE — 93005 ELECTROCARDIOGRAM TRACING: CPT

## 2024-01-02 PROCEDURE — 87186 SC STD MICRODIL/AGAR DIL: CPT

## 2024-01-02 PROCEDURE — 36415 COLL VENOUS BLD VENIPUNCTURE: CPT

## 2024-01-02 PROCEDURE — 87040 BLOOD CULTURE FOR BACTERIA: CPT

## 2024-01-02 PROCEDURE — 84484 ASSAY OF TROPONIN QUANT: CPT

## 2024-01-02 PROCEDURE — 87899 AGENT NOS ASSAY W/OPTIC: CPT

## 2024-01-02 PROCEDURE — 99285 EMERGENCY DEPT VISIT HI MDM: CPT

## 2024-01-02 PROCEDURE — 94003 VENT MGMT INPAT SUBQ DAY: CPT

## 2024-01-02 PROCEDURE — 87070 CULTURE OTHR SPECIMN AEROBIC: CPT

## 2024-01-02 PROCEDURE — 87077 CULTURE AEROBIC IDENTIFY: CPT

## 2024-01-02 PROCEDURE — 87449 NOS EACH ORGANISM AG IA: CPT

## 2024-01-02 PROCEDURE — 82330 ASSAY OF CALCIUM: CPT

## 2024-01-02 PROCEDURE — 86738 MYCOPLASMA ANTIBODY: CPT

## 2024-01-02 PROCEDURE — 97162 PT EVAL MOD COMPLEX 30 MIN: CPT

## 2024-01-02 PROCEDURE — 97116 GAIT TRAINING THERAPY: CPT

## 2024-01-02 PROCEDURE — 83605 ASSAY OF LACTIC ACID: CPT

## 2024-01-02 PROCEDURE — 71045 X-RAY EXAM CHEST 1 VIEW: CPT

## 2024-01-02 PROCEDURE — 87641 MR-STAPH DNA AMP PROBE: CPT

## 2024-01-02 PROCEDURE — 83735 ASSAY OF MAGNESIUM: CPT

## 2024-01-02 PROCEDURE — 83036 HEMOGLOBIN GLYCOSYLATED A1C: CPT

## 2024-01-02 PROCEDURE — 84132 ASSAY OF SERUM POTASSIUM: CPT

## 2024-01-02 PROCEDURE — 84100 ASSAY OF PHOSPHORUS: CPT

## 2024-01-02 PROCEDURE — 96374 THER/PROPH/DIAG INJ IV PUSH: CPT

## 2024-01-02 PROCEDURE — 85027 COMPLETE CBC AUTOMATED: CPT

## 2024-01-02 PROCEDURE — 97110 THERAPEUTIC EXERCISES: CPT

## 2024-01-02 PROCEDURE — 87640 STAPH A DNA AMP PROBE: CPT

## 2024-01-02 PROCEDURE — 84295 ASSAY OF SERUM SODIUM: CPT

## 2024-01-02 PROCEDURE — 85610 PROTHROMBIN TIME: CPT

## 2024-01-02 PROCEDURE — 85025 COMPLETE CBC W/AUTO DIFF WBC: CPT

## 2024-01-02 PROCEDURE — 94640 AIRWAY INHALATION TREATMENT: CPT

## 2024-01-02 PROCEDURE — 82803 BLOOD GASES ANY COMBINATION: CPT

## 2024-01-02 PROCEDURE — 87086 URINE CULTURE/COLONY COUNT: CPT

## 2024-01-02 PROCEDURE — 87635 SARS-COV-2 COVID-19 AMP PRB: CPT

## 2024-01-02 PROCEDURE — 82962 GLUCOSE BLOOD TEST: CPT

## 2024-01-02 RX ORDER — INSULIN GLARGINE 100 [IU]/ML
38 INJECTION, SOLUTION SUBCUTANEOUS AT BEDTIME
Refills: 0 | Status: DISCONTINUED | OUTPATIENT
Start: 2024-01-02 | End: 2024-01-02

## 2024-01-02 RX ORDER — INSULIN GLARGINE 100 [IU]/ML
34 INJECTION, SOLUTION SUBCUTANEOUS AT BEDTIME
Refills: 0 | Status: DISCONTINUED | OUTPATIENT
Start: 2024-01-02 | End: 2024-01-02

## 2024-01-02 RX ORDER — LEVOFLOXACIN 5 MG/ML
1 INJECTION, SOLUTION INTRAVENOUS
Qty: 2 | Refills: 0
Start: 2024-01-02 | End: 2024-01-03

## 2024-01-02 RX ADMIN — Medication 2: at 12:36

## 2024-01-02 RX ADMIN — GABAPENTIN 100 MILLIGRAM(S): 400 CAPSULE ORAL at 21:12

## 2024-01-02 RX ADMIN — Medication 2: at 17:13

## 2024-01-02 RX ADMIN — HEPARIN SODIUM 5000 UNIT(S): 5000 INJECTION INTRAVENOUS; SUBCUTANEOUS at 13:17

## 2024-01-02 RX ADMIN — Medication 3 MILLILITER(S): at 14:50

## 2024-01-02 RX ADMIN — Medication 10 UNIT(S): at 08:31

## 2024-01-02 RX ADMIN — BUDESONIDE AND FORMOTEROL FUMARATE DIHYDRATE 2 PUFF(S): 160; 4.5 AEROSOL RESPIRATORY (INHALATION) at 21:12

## 2024-01-02 RX ADMIN — Medication 81 MILLIGRAM(S): at 12:37

## 2024-01-02 RX ADMIN — PANTOPRAZOLE SODIUM 40 MILLIGRAM(S): 20 TABLET, DELAYED RELEASE ORAL at 06:20

## 2024-01-02 RX ADMIN — Medication 10 UNIT(S): at 12:36

## 2024-01-02 RX ADMIN — Medication 3 MILLILITER(S): at 20:02

## 2024-01-02 RX ADMIN — BUDESONIDE AND FORMOTEROL FUMARATE DIHYDRATE 2 PUFF(S): 160; 4.5 AEROSOL RESPIRATORY (INHALATION) at 12:38

## 2024-01-02 RX ADMIN — Medication 10 UNIT(S): at 17:14

## 2024-01-02 RX ADMIN — GABAPENTIN 100 MILLIGRAM(S): 400 CAPSULE ORAL at 06:20

## 2024-01-02 RX ADMIN — GABAPENTIN 100 MILLIGRAM(S): 400 CAPSULE ORAL at 13:17

## 2024-01-02 RX ADMIN — HEPARIN SODIUM 5000 UNIT(S): 5000 INJECTION INTRAVENOUS; SUBCUTANEOUS at 06:20

## 2024-01-02 RX ADMIN — TAMSULOSIN HYDROCHLORIDE 0.4 MILLIGRAM(S): 0.4 CAPSULE ORAL at 21:12

## 2024-01-02 RX ADMIN — INSULIN GLARGINE 34 UNIT(S): 100 INJECTION, SOLUTION SUBCUTANEOUS at 21:16

## 2024-01-02 RX ADMIN — Medication 3 MILLILITER(S): at 08:26

## 2024-01-02 RX ADMIN — FINASTERIDE 5 MILLIGRAM(S): 5 TABLET, FILM COATED ORAL at 12:38

## 2024-01-02 RX ADMIN — LATANOPROST 1 DROP(S): 0.05 SOLUTION/ DROPS OPHTHALMIC; TOPICAL at 21:22

## 2024-01-02 RX ADMIN — Medication 4: at 08:30

## 2024-01-02 RX ADMIN — Medication 2: at 21:18

## 2024-01-02 RX ADMIN — HEPARIN SODIUM 5000 UNIT(S): 5000 INJECTION INTRAVENOUS; SUBCUTANEOUS at 21:26

## 2024-01-02 NOTE — PROGRESS NOTE ADULT - PROBLEM SELECTOR PLAN 2
Likely secondary to COPD and hypercapnia.  Resolved.  CT head negative for any acute infarct, bleed or midline shift.  Maintain safety measures.
Likely secondary to COPD and hypercapnia.  Resolving.   CT head negative for any acute infarct, bleed or midline shift.  Maintain safety measures.
Likely secondary to COPD and hypercapnia.  Resolved.  CT head negative for any acute infarct, bleed or midline shift.  Maintain safety measures.
Likely secondary to COPD and hypercapnia.  Resolving.   CT head negative for any acute infarct, bleed or midline shift.  Maintain safety measures.
Likely secondary to COPD and hypercapnia.  Resolved.  CT head negative for any acute infarct, bleed or midline shift.  Maintain safety measures.
Likely secondary to COPD and hypercapnia.  Resolved.  CT head negative for any acute infarct, bleed or midline shift.  Maintain safety measures.

## 2024-01-02 NOTE — PROGRESS NOTE ADULT - PROVIDER SPECIALTY LIST ADULT
Critical Care
Pulmonology
Critical Care
Pulmonology
Pulmonology
Critical Care

## 2024-01-02 NOTE — PROGRESS NOTE ADULT - PROBLEM SELECTOR PROBLEM 6
HTN (hypertension)
Pulmonary HTN

## 2024-01-02 NOTE — PROGRESS NOTE ADULT - PROBLEM SELECTOR PLAN 1
Likely secondary to pna and COPD  Acute resolving.  Intubated upon admission. Extubated 12/26 and started on BIPAP.  Continue oxygen support. Refusing to use BIPAP.  Continue to monitor oxygen saturation.  Bronchodilators and ICS.  Prednisone completed.  Continue levaquin for pna thru 1/04.
Likely secondary to pna and COPD  Acute resolving.  Intubated upon admission. Extubated 12/26 and started on BIPAP.  Continue oxygen support. Refusing to use BIPAP.  Continue to monitor oxygen saturation.  Bronchodilators and ICS.  Prednisone completed.  Continue levaquin for pna thru 1/04.
Likely secondary to pna and COPD  Intubated upon admission. Extubated 12/26 and started on BIPAP.  Continue oxygen support. Refusing to use BIPAP.  Continue to monitor oxygen saturation.  Bronchodilators and ICS.  Prednisone taper.  Continue ceftriaxone for pna.
Likely secondary to pna and COPD  Intubated upon admission. Extubated 12/26 and started on BIPAP.  Continue oxygen support. Refusing to use BIPAP.  Continue to monitor oxygen saturation.  Bronchodilators and ICS.  Prednisone taper.  Continue ceftriaxone for pna.
Likely secondary to pna and COPD  Intubated upon admission. Extubated 12/26 and started on BIPAP.  Continue oxygen support. Refusing to use BIPAP.  Continue to monitor oxygen saturation.  Bronchodilators and ICS.  Prednisone completed.  Continue levaquin for pna thru 1/04
Likely secondary to pna and COPD  Intubated upon admission. Extubated 12/26 and started on BIPAP.  Continue oxygen support. Refusing to use BIPAP.  Continue to monitor oxygen saturation.  Bronchodilators and ICS.  Prednisone completed.  Continue levaquin for pna thru 1/04

## 2024-01-02 NOTE — DISCHARGE NOTE NURSING/CASE MANAGEMENT/SOCIAL WORK - NSDCPEFALRISK_GEN_ALL_CORE
For information on Fall & Injury Prevention, visit: [email or web address] OR  [email or web address] [email or web address] OR  https://www.cdc.gov/steadi/patient.html For information on Fall & Injury Prevention, visit: [email or web address] OR  [email or web address]-[email or web address] OR  https://www.cdc.gov/steadi/patient.html

## 2024-01-02 NOTE — PROGRESS NOTE ADULT - PROBLEM SELECTOR PLAN 6
Currently on not any medications.  Antihypertensives currently on hold.  Continue to monitor.
Currently on  any medications.  Antihypertensives currently on hold.  Continue to monitor.
Currently on not any medications.  Antihypertensives currently on hold.  Continue to monitor.
Currently not on any medications.  Antihypertensives currently on hold.  Continue to monitor.

## 2024-01-02 NOTE — PROGRESS NOTE ADULT - ASSESSMENT
64 yo male from [facility], active smoker, with a PMH of CAD, CHF on nocturnal AVAPS-AE (Trilogy), COPD ( MULTIPLE intubations in past on 3L of home O2), peripheral neuropathy, GERD, HTN, HLD, obesity, polyarthritis, Pulmonary HTN, TIA, Vit D Def, presents with hypoxemia from nursing facility and lethargy. Upon evaluation in ED, patient afebrile, BP of 126/58, tachycardic to 115bpm, hypoxic placed on 15L NRBM saturating 97%. ABG showing ph of 7.17, CO2 of 145. Patient noted to be lethargic and subsequently intubated. Physical examination noted for dry mucus membranes, Tejada in place immediately draining 2L clear urine. Lungs clear to auscultation. Labs significant for no leukocytosis, lactate 0.9, negative cardiac enzymes and UA negative for infection. CT head negative for any acute intracranial processes. CT chest showing patchy opacities in the right middle lobe and bilateral lower lobes with areas of consolidation suspicious for infectious etiology. EKG sinus tachycardia 115bpm with RBBB with no acute ischemic changes. Patient admitted to ICU for Acute on chronic hypoxic hypercapnic respiratory failure 2/2 Suspected COPD exacerbation.   <ICU course>  Patient when admitted to ICU, treated for PNA with Rocephin and azithromycin. Patient remained on mechanical ventilator for 1 day. Hypercapnia resolved and the next day 12/26 patient was extubated. Tejada removed and patient was found to be retaining urine straight cath was done resulting in >700 cc of urine. Patient found to be retaining and Tejada was replaced. Blood cultures Blood and urine cultures are negative to date. Sputum cultures noted to be growing pseudomonas. Legionella negative. Patient completed azithromycin and was started on prednisone taper.  PT recommended home PT. Patient refusing nocturnal BiPAP.    64 yo male from [facility], active smoker, with a PMH of CAD, CHF on nocturnal AVAPS-AE (Trilogy), COPD ( MULTIPLE intubations in past on 3L of home O2), peripheral neuropathy, GERD, HTN, HLD, obesity, polyarthritis, Pulmonary HTN, TIA, Vit D Def, presents with hypoxemia from nursing facility and lethargy. Upon evaluation in ED, patient afebrile, BP of 126/58, tachycardic to 115bpm, hypoxic placed on 15L NRBM saturating 97%. ABG showing ph of 7.17, CO2 of 145. Patient noted to be lethargic and subsequently intubated. Physical examination noted for dry mucus membranes, Tejada in place immediately draining 2L clear urine. Lungs clear to auscultation. Labs significant for no leukocytosis, lactate 0.9, negative cardiac enzymes and UA negative for infection. CT head negative for any acute intracranial processes. CT chest showing patchy opacities in the right middle lobe and bilateral lower lobes with areas of consolidation suspicious for infectious etiology. EKG sinus tachycardia 115bpm with RBBB with no acute ischemic changes. Patient admitted to ICU for Acute on chronic hypoxic hypercapnic respiratory failure 2/2 Suspected COPD exacerbation.   <ICU course>  Patient when admitted to ICU, treated for PNA with Rocephin and azithromycin. Patient remained on mechanical ventilator for 1 day. Hypercapnia resolved and the next day 12/26 patient was extubated. Tejada removed and patient was found to be retaining urine straight cath was done resulting in >700 cc of urine. Patient found to be retaining and Tejada was replaced. Blood cultures Blood and urine cultures are negative to date. Sputum cultures noted to be growing pseudomonas. Legionella negative. Patient completed azithromycin and was started on prednisone taper.  PT recommended home PT. Patient refusing nocturnal BiPAP.

## 2024-01-02 NOTE — PROGRESS NOTE ADULT - PROBLEM SELECTOR PLAN 3
Sputum positive for pseudomonas aeruginosa.  Antibiotics switched to levaquin.  Continue antibiotics thru 1/04  CT chest shoes patchy opacities RML and bilateral lower lobes.  Continue oxygen support.  Extubated on 12/26.
Sputum positive for pseudomonas aeruginosa.  Continue Ceftriaxone. F/U sensitivities. May need to switch to meropenem or cefepime.  CT chest shoes patchy opacities RML and bilateral lower lobes.  Continue oxygen support.  Extubated on 12/26.
Sputum positive for pseudomonas aeruginosa.  Continue Ceftriaxone. F/U sensitivities. May need to switch to meropenem or cefepime.  CT chest shoes patchy opacities RML and bilateral lower lobes.  Continue oxygen support.  Extubated on 12/26.
Sputum positive for pseudomonas aeruginosa.  Antibiotics switched to levaquin.  Continue antibiotics thru 1/04  CT chest shoes patchy opacities RML and bilateral lower lobes.  Continue oxygen support.  Extubated on 12/26.

## 2024-01-02 NOTE — PROGRESS NOTE ADULT - NS ATTEND OPT1 GEN_ALL_CORE
I attest my time as attending is greater than 50% of the total combined time spent on qualifying patient care activities by the PA/NP and attending.
I independently performed the documented:
I independently performed the documented:
I attest my time as attending is greater than 50% of the total combined time spent on qualifying patient care activities by the PA/NP and attending.
I attest my time as attending is greater than 50% of the total combined time spent on qualifying patient care activities by the PA/NP and attending.
I independently performed the documented:
I independently performed the documented:
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
## 2024-01-02 NOTE — PROGRESS NOTE ADULT - SUBJECTIVE AND OBJECTIVE BOX
DEVIKA CARBALLO    SCU progress note    INTERVAL HPI/OVERNIGHT EVENTS: *** no overnight events    DNR [ ]   DNI  [  ] Full Code    Covid - 19 PCR: 12/24/23 negative    The 4Ms    What Matters Most: see GOC  Age appropriate Medications/Screen for High Risk Medication: Yes  Mentation: see CAM below  Mobility: defer to physical exam    The Confusion Assessment Method (CAM) Diagnostic Algorithm     1: Acute Onset or Fluctuating Course  - Is there evidence of an acute change in mental status from the patient’s baseline? Did the (abnormal) behavior  fluctuate during the day, that is, tend to come and go, or increase and decrease in severity?  [ ] YES [x ] NO     2: Inattention  - Did the patient have difficulty focusing attention, being easily distractible, or having difficulty keeping track of what was being said?  [ ] YES [x ] NO     3: Disorganized thinking  -Was the patient’s thinking disorganized or incoherent, such as rambling or irrelevant conversation, unclear or illogical flow of ideas, or unpredictable switching from subject to subject?  [ ] YES [ x] NO    4: Altered Level of consciousness?  [ ] YES [ x] NO    The diagnosis of delirium by CAM requires the presence of features 1 and 2 and either 3 or 4.    PRESSORS: [ ] YES [ x] NO  levoFLOXacin  Tablet 500 milliGRAM(s) Oral every 24 hours    Cardiovascular:  Heart Failure  Acute   Acute on Chronic  Chronic         Pulmonary:  albuterol/ipratropium for Nebulization 3 milliLiter(s) Nebulizer every 6 hours  budesonide 160 MICROgram(s)/formoterol 4.5 MICROgram(s) Inhaler 2 Puff(s) Inhalation two times a day    Hematalogic:  aspirin enteric coated 81 milliGRAM(s) Oral daily  heparin   Injectable 5000 Unit(s) SubCutaneous every 8 hours    Other:  finasteride 5 milliGRAM(s) Oral daily  gabapentin 100 milliGRAM(s) Oral three times a day  glucagon  Injectable 1 milliGRAM(s) IntraMuscular once  insulin glargine Injectable (LANTUS) 26 Unit(s) SubCutaneous at bedtime  insulin lispro (ADMELOG) corrective regimen sliding scale   SubCutaneous three times a day before meals  insulin lispro (ADMELOG) corrective regimen sliding scale   SubCutaneous at bedtime  insulin lispro Injectable (ADMELOG) 10 Unit(s) SubCutaneous three times a day before meals  latanoprost 0.005% Ophthalmic Solution 1 Drop(s) Both EYES at bedtime  lidocaine   4% Patch 1 Patch Transdermal daily  melatonin 5 milliGRAM(s) Oral at bedtime  oxycodone    5 mG/acetaminophen 325 mG 1 Tablet(s) Oral every 6 hours PRN  pantoprazole    Tablet 40 milliGRAM(s) Oral before breakfast  tamsulosin 0.4 milliGRAM(s) Oral at bedtime    albuterol/ipratropium for Nebulization 3 milliLiter(s) Nebulizer every 6 hours  aspirin enteric coated 81 milliGRAM(s) Oral daily  budesonide 160 MICROgram(s)/formoterol 4.5 MICROgram(s) Inhaler 2 Puff(s) Inhalation two times a day  finasteride 5 milliGRAM(s) Oral daily  gabapentin 100 milliGRAM(s) Oral three times a day  glucagon  Injectable 1 milliGRAM(s) IntraMuscular once  heparin   Injectable 5000 Unit(s) SubCutaneous every 8 hours  insulin glargine Injectable (LANTUS) 26 Unit(s) SubCutaneous at bedtime  insulin lispro (ADMELOG) corrective regimen sliding scale   SubCutaneous three times a day before meals  insulin lispro (ADMELOG) corrective regimen sliding scale   SubCutaneous at bedtime  insulin lispro Injectable (ADMELOG) 10 Unit(s) SubCutaneous three times a day before meals  latanoprost 0.005% Ophthalmic Solution 1 Drop(s) Both EYES at bedtime  levoFLOXacin  Tablet 500 milliGRAM(s) Oral every 24 hours  lidocaine   4% Patch 1 Patch Transdermal daily  melatonin 5 milliGRAM(s) Oral at bedtime  oxycodone    5 mG/acetaminophen 325 mG 1 Tablet(s) Oral every 6 hours PRN  pantoprazole    Tablet 40 milliGRAM(s) Oral before breakfast  tamsulosin 0.4 milliGRAM(s) Oral at bedtime    Drug Dosing Weight  Height (cm): 185.4 (06 Oct 2023 18:01)  Weight (kg): 88.7 (27 Dec 2023 07:57)  BMI (kg/m2): 25.8 (27 Dec 2023 07:57)  BSA (m2): 2.13 (27 Dec 2023 07:57)    CENTRAL LINE: [ ] YES [x ] NO  LOCATION:   DATE INSERTED:  REMOVE: [ ] YES [ ] NO  EXPLAIN:    AMOR: [ ] YES [ x] NO    DATE INSERTED:  REMOVE:  [ ] YES [ ] NO  EXPLAIN:    PAST MEDICAL & SURGICAL HISTORY:  COPD (chronic obstructive pulmonary disease)  Oxygen 2-3L at home      Stented coronary artery  2017      HLD (hyperlipidemia)      Pulmonary HTN      Type 2 diabetes mellitus without complication, with long-term current use of insulin      DM (diabetes mellitus)      CAD (coronary artery disease)      GERD (gastroesophageal reflux disease)      Insomnia      CHF (congestive heart failure)      HTN (hypertension)      Mood disorder      No significant past surgical history                  01-01 @ 07:01  -  01-02 @ 07:00  --------------------------------------------------------  IN: 0 mL / OUT: 300 mL / NET: -300 mL            PHYSICAL EXAM:    GENERAL: NAD, well-groomed, well-developed  HEAD:  Atraumatic, Normocephalic  EYES: EOMI, PERRLA, conjunctiva and sclera clear  ENMT: No tonsillar erythema, exudates, or enlargement; Moist mucous membranes, Good dentition, No lesions  NECK: Supple, No JVD, Normal thyroid  NERVOUS SYSTEM:  Alert & Oriented X3, Good concentration; Motor Strength 5/5 B/L upper and lower extremities  CHEST/LUNG: Diminished good airflow; No rales, rhonchi, wheezing, or rubs  HEART: Regular rate and rhythm; No murmurs, rubs, or gallops  ABDOMEN: Soft, Nontender, Nondistended; Bowel sounds present  EXTREMITIES:  2+ Peripheral Pulses, No clubbing, cyanosis, or edema  LYMPH: No lymphadenopathy noted  SKIN: No rashes or lesions      LABS:      [  ]  DVT Prophylaxis  [  ]  Nutrition, Brand, Rate         Goal Rate        Abnormal Nutritional Status -  Malnutrition   Cachexia          RADIOLOGY & ADDITIONAL STUDIES:  ***  < from: Xray Chest 1 View- PORTABLE-Urgent (Xray Chest 1 View- PORTABLE-Urgent .) (12.24.23 @ 22:27) >  ACC: [id number] EXAM:  XR CHEST PORTABLE URGENT 1V   ORDERED BY: GREG MONTOYA     PROCEDURE DATE:  12/24/2023          INTERPRETATION:  Chest radiograph (one view)     CPT 03449    CLINICAL INFORMATION:  Assess ET tube placement.  No additional   information is provided.    TECHNIQUE:  Single frontal view of the chest was obtained.    FINDINGS:  Prior study of earlier the same day was available for review.    The lungs demonstrate patchy bilateral lower lobe airspace opacities.   Small right pleuraleffusion is noted. Endotracheal tube is noted in   satisfactory position above the angela.    The heart and mediastinum appear intact.  Fullness of both hilar regions   is noted.      IMPRESSION: Patchy bilateral lower lobe airspace opacities are noted.   Small right pleural effusion is seen. Endotracheal tube noted in   satisfactory position above the angela.    --- End of Report ---      < end of copied text >  < from: CT Chest No Cont (12.24.23 @ 21:43) >  ACC: [id number] EXAM:  CT CHEST   ORDERED BY: GREG MONTOYA     PROCEDURE DATE:  12/24/2023          INTERPRETATION:  CLINICAL INFORMATION: Shortness of breath.    COMPARISON: CT chest 10/5/2023.    PROCEDURE:  CT of the Chest was performed without intravenous contrast.  Sagittal and coronal reformats were performed.      FINDINGS: Absence of intravenous contrast limits evaluation of   vasculature and solid organs.    CHEST:  LUNGS AND LARGE AIRWAYS: Patent central airways.  Emphysematous changes.   Patchy opacities in the right middle lobe and bilateral lower lobes with   areas of consolidation.  Continued peribronchial thickening and mucus   impacted airways of the right middle lobe and bilateral lower lobes  PLEURA: No pleural effusion.  VESSELS: Atherosclerotic changes.  HEART: Cardiomegaly. No pericardial effusion. Coronary artery   calcification and/or stenting.  MEDIASTINUM AND [place]: Stable mildly prominent mediastinal and right hilar   lymph nodes.  CHEST WALL AND LOWER NECK: Bilateral gynecomastia.  VISUALIZED UPPER ABDOMEN: Too small to characterize hepatic   hypodensities. Stable splenic hypodense lesion. Unchanged left adrenal   nodule. Partially imaged right renal cyst.  BONES: Degenerative changes.    IMPRESSION:    Patchy opacities in the right middle lobe and bilateral lower lobes with   areas of consolidation. Findings likely infectious/inflammatory or   aspiration related. Pulmonary imaging in 6 weeks advised to demonstrate   resolution.    --- End of Report ---    < end of copied text >  < from: CT Head No Cont (12.24.23 @ 21:42) >  ACC: [id number] EXAM:  CT BRAIN   ORDERED BY: GREG MONTOYA     PROCEDURE DATE:  12/24/2023          INTERPRETATION:  Brain CT    Indication: Altered mental status    Technique: Axial images were obtained, along with reformatted coronal and   sagittal images.    Comparison:  CT of October 5, 2023    FINDINGS:    There is no intracranial hemorrhage, abnormal extra-axial fluid   collection, mass effect, midline shift or large acute cortical infarct.    Gray-white differentiation is maintained.  There are subcortical and   periventricular white matter lucencies likely representing microvascular   ischemic disease.    The mastoid air cells and visualized paranasal sinuses are well aerated.    IMPRESSION:    No intracranial hemorrhage, mass effect or large acute cortical infarct.    --- End of Report ---      < end of copied text >    Goals of Care Discussion with Family/Proxy/Other   - see note from/family meeting set up for...     DEVIKA CARBALLO    SCU progress note    INTERVAL HPI/OVERNIGHT EVENTS: *** no overnight events    DNR [ ]   DNI  [  ] Full Code    Covid - 19 PCR: 12/24/23 negative    The 4Ms    What Matters Most: see GOC  Age appropriate Medications/Screen for High Risk Medication: Yes  Mentation: see CAM below  Mobility: defer to physical exam    The Confusion Assessment Method (CAM) Diagnostic Algorithm     1: Acute Onset or Fluctuating Course  - Is there evidence of an acute change in mental status from the patient’s baseline? Did the (abnormal) behavior  fluctuate during the day, that is, tend to come and go, or increase and decrease in severity?  [ ] YES [x ] NO     2: Inattention  - Did the patient have difficulty focusing attention, being easily distractible, or having difficulty keeping track of what was being said?  [ ] YES [x ] NO     3: Disorganized thinking  -Was the patient’s thinking disorganized or incoherent, such as rambling or irrelevant conversation, unclear or illogical flow of ideas, or unpredictable switching from subject to subject?  [ ] YES [ x] NO    4: Altered Level of consciousness?  [ ] YES [ x] NO    The diagnosis of delirium by CAM requires the presence of features 1 and 2 and either 3 or 4.    PRESSORS: [ ] YES [ x] NO  levoFLOXacin  Tablet 500 milliGRAM(s) Oral every 24 hours    Cardiovascular:  Heart Failure  Acute   Acute on Chronic  Chronic         Pulmonary:  albuterol/ipratropium for Nebulization 3 milliLiter(s) Nebulizer every 6 hours  budesonide 160 MICROgram(s)/formoterol 4.5 MICROgram(s) Inhaler 2 Puff(s) Inhalation two times a day    Hematalogic:  aspirin enteric coated 81 milliGRAM(s) Oral daily  heparin   Injectable 5000 Unit(s) SubCutaneous every 8 hours    Other:  finasteride 5 milliGRAM(s) Oral daily  gabapentin 100 milliGRAM(s) Oral three times a day  glucagon  Injectable 1 milliGRAM(s) IntraMuscular once  insulin glargine Injectable (LANTUS) 26 Unit(s) SubCutaneous at bedtime  insulin lispro (ADMELOG) corrective regimen sliding scale   SubCutaneous three times a day before meals  insulin lispro (ADMELOG) corrective regimen sliding scale   SubCutaneous at bedtime  insulin lispro Injectable (ADMELOG) 10 Unit(s) SubCutaneous three times a day before meals  latanoprost 0.005% Ophthalmic Solution 1 Drop(s) Both EYES at bedtime  lidocaine   4% Patch 1 Patch Transdermal daily  melatonin 5 milliGRAM(s) Oral at bedtime  oxycodone    5 mG/acetaminophen 325 mG 1 Tablet(s) Oral every 6 hours PRN  pantoprazole    Tablet 40 milliGRAM(s) Oral before breakfast  tamsulosin 0.4 milliGRAM(s) Oral at bedtime    albuterol/ipratropium for Nebulization 3 milliLiter(s) Nebulizer every 6 hours  aspirin enteric coated 81 milliGRAM(s) Oral daily  budesonide 160 MICROgram(s)/formoterol 4.5 MICROgram(s) Inhaler 2 Puff(s) Inhalation two times a day  finasteride 5 milliGRAM(s) Oral daily  gabapentin 100 milliGRAM(s) Oral three times a day  glucagon  Injectable 1 milliGRAM(s) IntraMuscular once  heparin   Injectable 5000 Unit(s) SubCutaneous every 8 hours  insulin glargine Injectable (LANTUS) 26 Unit(s) SubCutaneous at bedtime  insulin lispro (ADMELOG) corrective regimen sliding scale   SubCutaneous three times a day before meals  insulin lispro (ADMELOG) corrective regimen sliding scale   SubCutaneous at bedtime  insulin lispro Injectable (ADMELOG) 10 Unit(s) SubCutaneous three times a day before meals  latanoprost 0.005% Ophthalmic Solution 1 Drop(s) Both EYES at bedtime  levoFLOXacin  Tablet 500 milliGRAM(s) Oral every 24 hours  lidocaine   4% Patch 1 Patch Transdermal daily  melatonin 5 milliGRAM(s) Oral at bedtime  oxycodone    5 mG/acetaminophen 325 mG 1 Tablet(s) Oral every 6 hours PRN  pantoprazole    Tablet 40 milliGRAM(s) Oral before breakfast  tamsulosin 0.4 milliGRAM(s) Oral at bedtime    Drug Dosing Weight  Height (cm): 185.4 (06 Oct 2023 18:01)  Weight (kg): 88.7 (27 Dec 2023 07:57)  BMI (kg/m2): 25.8 (27 Dec 2023 07:57)  BSA (m2): 2.13 (27 Dec 2023 07:57)    CENTRAL LINE: [ ] YES [x ] NO  LOCATION:   DATE INSERTED:  REMOVE: [ ] YES [ ] NO  EXPLAIN:    AMOR: [ ] YES [ x] NO    DATE INSERTED:  REMOVE:  [ ] YES [ ] NO  EXPLAIN:    PAST MEDICAL & SURGICAL HISTORY:  COPD (chronic obstructive pulmonary disease)  Oxygen 2-3L at home      Stented coronary artery  2017      HLD (hyperlipidemia)      Pulmonary HTN      Type 2 diabetes mellitus without complication, with long-term current use of insulin      DM (diabetes mellitus)      CAD (coronary artery disease)      GERD (gastroesophageal reflux disease)      Insomnia      CHF (congestive heart failure)      HTN (hypertension)      Mood disorder      No significant past surgical history                  01-01 @ 07:01  -  01-02 @ 07:00  --------------------------------------------------------  IN: 0 mL / OUT: 300 mL / NET: -300 mL            PHYSICAL EXAM:    GENERAL: NAD, well-groomed, well-developed  HEAD:  Atraumatic, Normocephalic  EYES: EOMI, PERRLA, conjunctiva and sclera clear  ENMT: No tonsillar erythema, exudates, or enlargement; Moist mucous membranes, Good dentition, No lesions  NECK: Supple, No JVD, Normal thyroid  NERVOUS SYSTEM:  Alert & Oriented X3, Good concentration; Motor Strength 5/5 B/L upper and lower extremities  CHEST/LUNG: Diminished good airflow; No rales, rhonchi, wheezing, or rubs  HEART: Regular rate and rhythm; No murmurs, rubs, or gallops  ABDOMEN: Soft, Nontender, Nondistended; Bowel sounds present  EXTREMITIES:  2+ Peripheral Pulses, No clubbing, cyanosis, or edema  LYMPH: No lymphadenopathy noted  SKIN: No rashes or lesions      LABS:      [  ]  DVT Prophylaxis  [  ]  Nutrition, Brand, Rate         Goal Rate        Abnormal Nutritional Status -  Malnutrition   Cachexia          RADIOLOGY & ADDITIONAL STUDIES:  ***  < from: Xray Chest 1 View- PORTABLE-Urgent (Xray Chest 1 View- PORTABLE-Urgent .) (12.24.23 @ 22:27) >  ACC: [id number] EXAM:  XR CHEST PORTABLE URGENT 1V   ORDERED BY: GREG MONTOYA     PROCEDURE DATE:  12/24/2023          INTERPRETATION:  Chest radiograph (one view)     CPT 79198    CLINICAL INFORMATION:  Assess ET tube placement.  No additional   information is provided.    TECHNIQUE:  Single frontal view of the chest was obtained.    FINDINGS:  Prior study of earlier the same day was available for review.    The lungs demonstrate patchy bilateral lower lobe airspace opacities.   Small right pleuraleffusion is noted. Endotracheal tube is noted in   satisfactory position above the angela.    The heart and mediastinum appear intact.  Fullness of both hilar regions   is noted.      IMPRESSION: Patchy bilateral lower lobe airspace opacities are noted.   Small right pleural effusion is seen. Endotracheal tube noted in   satisfactory position above the angela.    --- End of Report ---      < end of copied text >  < from: CT Chest No Cont (12.24.23 @ 21:43) >  ACC: [id number] EXAM:  CT CHEST   ORDERED BY: GREG MONTOYA     PROCEDURE DATE:  12/24/2023          INTERPRETATION:  CLINICAL INFORMATION: Shortness of breath.    COMPARISON: CT chest 10/5/2023.    PROCEDURE:  CT of the Chest was performed without intravenous contrast.  Sagittal and coronal reformats were performed.      FINDINGS: Absence of intravenous contrast limits evaluation of   vasculature and solid organs.    CHEST:  LUNGS AND LARGE AIRWAYS: Patent central airways.  Emphysematous changes.   Patchy opacities in the right middle lobe and bilateral lower lobes with   areas of consolidation.  Continued peribronchial thickening and mucus   impacted airways of the right middle lobe and bilateral lower lobes  PLEURA: No pleural effusion.  VESSELS: Atherosclerotic changes.  HEART: Cardiomegaly. No pericardial effusion. Coronary artery   calcification and/or stenting.  MEDIASTINUM AND [place]: Stable mildly prominent mediastinal and right hilar   lymph nodes.  CHEST WALL AND LOWER NECK: Bilateral gynecomastia.  VISUALIZED UPPER ABDOMEN: Too small to characterize hepatic   hypodensities. Stable splenic hypodense lesion. Unchanged left adrenal   nodule. Partially imaged right renal cyst.  BONES: Degenerative changes.    IMPRESSION:    Patchy opacities in the right middle lobe and bilateral lower lobes with   areas of consolidation. Findings likely infectious/inflammatory or   aspiration related. Pulmonary imaging in 6 weeks advised to demonstrate   resolution.    --- End of Report ---    < end of copied text >  < from: CT Head No Cont (12.24.23 @ 21:42) >  ACC: [id number] EXAM:  CT BRAIN   ORDERED BY: GREG MONTOYA     PROCEDURE DATE:  12/24/2023          INTERPRETATION:  Brain CT    Indication: Altered mental status    Technique: Axial images were obtained, along with reformatted coronal and   sagittal images.    Comparison:  CT of October 5, 2023    FINDINGS:    There is no intracranial hemorrhage, abnormal extra-axial fluid   collection, mass effect, midline shift or large acute cortical infarct.    Gray-white differentiation is maintained.  There are subcortical and   periventricular white matter lucencies likely representing microvascular   ischemic disease.    The mastoid air cells and visualized paranasal sinuses are well aerated.    IMPRESSION:    No intracranial hemorrhage, mass effect or large acute cortical infarct.    --- End of Report ---      < end of copied text >    Goals of Care Discussion with Family/Proxy/Other   - see note from/family meeting set up for...

## 2024-01-02 NOTE — PROGRESS NOTE ADULT - PROBLEM SELECTOR PLAN 7
Currently not on any medications  Continue to monitor.
Currently not on any medications  Continue to monitor.
Continue to monitor off medication.  Last echo  EF 55%. RV systolic pressure 53. Moderate Pulmonary hypertension.
Continue to monitor off medication.  Last echo  EF 55%. RV systolic pressure 53. Moderate Pulmonary hypertension.
Antihypertensives currently on hold.  Continue to monitor.
Currently not on any medications  Continue to monitor.

## 2024-01-02 NOTE — PROGRESS NOTE ADULT - REASON FOR ADMISSION
Acute on chronic hypoxic hypercapnic respiratory failure 2/2 Suspected COPD exacerbation

## 2024-01-02 NOTE — DISCHARGE NOTE NURSING/CASE MANAGEMENT/SOCIAL WORK - PATIENT PORTAL LINK FT
You can access the FollowMyHealth Patient Portal offered by [facility] by registering at the following website: [email or web address]. By joining [employer]’s FollowMyHealth portal, you will also be able to view your health information using other applications (apps) compatible with our system. You can access the FollowMyHealth Patient Portal offered by [facility] by registering at the following website: [email or web address]. By joining [employer]’s FollowMyHealth portal, you will also be able to view your health information using other applications (apps) compatible with our system.

## 2024-01-02 NOTE — PROGRESS NOTE ADULT - NS ATTEND AMEND GEN_ALL_CORE FT
Problem/Plan - 1:  ·  Problem: Acute on chronic respiratory failure with hypoxia and hypercapnia.   ·  Plan: Likely secondary to pna and COPD  Acute resolving.  Intubated upon admission. Extubated 12/26 and started on BIPAP.  Continue oxygen support. Refusing to use BIPAP.  Continue to monitor oxygen saturation.  Bronchodilators and ICS.  Prednisone completed.  Continue levaquin for pna thru 1/04.     Problem/Plan - 2:  ·  Problem: Acute metabolic encephalopathy.   ·  Plan: Likely secondary to COPD and hypercapnia.  Resolved.  CT head negative for any acute infarct, bleed or midline shift.  Maintain safety measures.     Problem/Plan - 3:  ·  Problem: Pneumonia due to Pseudomonas.   ·  Plan: Sputum positive for pseudomonas aeruginosa.  Antibiotics switched to levaquin.  Continue antibiotics thru 1/04  CT chest shoes patchy opacities RML and bilateral lower lobes.  Continue oxygen support.  Extubated on 12/26.     Problem/Plan - 4:  ·  Problem: End stage COPD.   ·  Plan: GOLD 4D  Continue oxygen support.  Patient uses 2-4LPM at AL  Noncompliant with Trilogy at AL  Continue bronchodilators and ICS.  Continue antibiotics. Azithromycin completed. Continue levaquin until 1/4  Sputum positive for pseudomonas aeruginosa.     Problem/Plan - 5:  ·  Problem: DM (diabetes mellitus).   ·  Plan: Uncontrolled.  Increase lantus to 30U HS  Continue lispro  Moderate sliding scale.  Continue to monitor glucose.     Problem/Plan - 6:  ·  Problem: HTN (hypertension).   ·  Plan: Currently not on any medications.  Antihypertensives currently on hold.  Continue to monitor.     Problem/Plan - 7:  ·  Problem: Pulmonary HTN.   ·  Plan: Continue to monitor off medication.  Last echo  EF 55%. RV systolic pressure 53. Moderate Pulmonary hypertension.

## 2024-01-02 NOTE — PROGRESS NOTE ADULT - PROBLEM SELECTOR PROBLEM 3
Pneumonia due to Pseudomonas

## 2024-01-19 ENCOUNTER — INPATIENT (INPATIENT)
Facility: HOSPITAL | Age: 64
LOS: 2 days | Discharge: NURSING, REHAB OR LONG-TERM CARE | End: 2024-01-22
Attending: HOSPITALIST | Admitting: HOSPITALIST
Payer: MEDICAID

## 2024-01-19 VITALS
TEMPERATURE: 98 F | OXYGEN SATURATION: 100 % | SYSTOLIC BLOOD PRESSURE: 156 MMHG | RESPIRATION RATE: 18 BRPM | DIASTOLIC BLOOD PRESSURE: 89 MMHG | HEART RATE: 89 BPM

## 2024-01-19 DIAGNOSIS — R44.3 HALLUCINATIONS, UNSPECIFIED: ICD-10-CM

## 2024-01-19 LAB
ALBUMIN SERPL ELPH-MCNC: 3.8 G/DL — SIGNIFICANT CHANGE UP (ref 3.3–5)
ALP SERPL-CCNC: 65 U/L — SIGNIFICANT CHANGE UP (ref 40–120)
ALT FLD-CCNC: 35 U/L — SIGNIFICANT CHANGE UP (ref 4–41)
AMPHET UR-MCNC: NEGATIVE — SIGNIFICANT CHANGE UP
ANION GAP SERPL CALC-SCNC: 13 MMOL/L — SIGNIFICANT CHANGE UP (ref 7–14)
ANION GAP SERPL CALC-SCNC: 9 MMOL/L — SIGNIFICANT CHANGE UP (ref 7–14)
APAP SERPL-MCNC: <10 UG/ML — LOW (ref 15–25)
APPEARANCE UR: ABNORMAL
AST SERPL-CCNC: 32 U/L — SIGNIFICANT CHANGE UP (ref 4–40)
B-OH-BUTYR SERPL-SCNC: <0 MMOL/L — SIGNIFICANT CHANGE UP (ref 0–0.4)
BACTERIA # UR AUTO: ABNORMAL /HPF
BARBITURATES UR SCN-MCNC: NEGATIVE — SIGNIFICANT CHANGE UP
BASE EXCESS BLDV CALC-SCNC: 7 MMOL/L — HIGH (ref -2–3)
BASOPHILS # BLD AUTO: 0.03 K/UL — SIGNIFICANT CHANGE UP (ref 0–0.2)
BASOPHILS NFR BLD AUTO: 0.3 % — SIGNIFICANT CHANGE UP (ref 0–2)
BENZODIAZ UR-MCNC: NEGATIVE — SIGNIFICANT CHANGE UP
BILIRUB SERPL-MCNC: 0.2 MG/DL — SIGNIFICANT CHANGE UP (ref 0.2–1.2)
BILIRUB UR-MCNC: NEGATIVE — SIGNIFICANT CHANGE UP
BUN SERPL-MCNC: 16 MG/DL — SIGNIFICANT CHANGE UP (ref 7–23)
BUN SERPL-MCNC: 17 MG/DL — SIGNIFICANT CHANGE UP (ref 7–23)
CALCIUM SERPL-MCNC: 8.8 MG/DL — SIGNIFICANT CHANGE UP (ref 8.4–10.5)
CALCIUM SERPL-MCNC: 9.2 MG/DL — SIGNIFICANT CHANGE UP (ref 8.4–10.5)
CAST: 0 /LPF — SIGNIFICANT CHANGE UP (ref 0–4)
CHLORIDE SERPL-SCNC: 94 MMOL/L — LOW (ref 98–107)
CHLORIDE SERPL-SCNC: 96 MMOL/L — LOW (ref 98–107)
CO2 BLDV-SCNC: 37.7 MMOL/L — HIGH (ref 22–26)
CO2 SERPL-SCNC: 28 MMOL/L — SIGNIFICANT CHANGE UP (ref 22–31)
CO2 SERPL-SCNC: 30 MMOL/L — SIGNIFICANT CHANGE UP (ref 22–31)
COCAINE METAB.OTHER UR-MCNC: NEGATIVE — SIGNIFICANT CHANGE UP
COLOR SPEC: YELLOW — SIGNIFICANT CHANGE UP
CREAT SERPL-MCNC: 0.57 MG/DL — SIGNIFICANT CHANGE UP (ref 0.5–1.3)
CREAT SERPL-MCNC: 0.64 MG/DL — SIGNIFICANT CHANGE UP (ref 0.5–1.3)
CREATININE URINE RESULT, DAU: 31 MG/DL — SIGNIFICANT CHANGE UP
DIFF PNL FLD: ABNORMAL
EGFR: 108 ML/MIN/1.73M2 — SIGNIFICANT CHANGE UP
EGFR: 112 ML/MIN/1.73M2 — SIGNIFICANT CHANGE UP
EOSINOPHIL # BLD AUTO: 0.02 K/UL — SIGNIFICANT CHANGE UP (ref 0–0.5)
EOSINOPHIL NFR BLD AUTO: 0.2 % — SIGNIFICANT CHANGE UP (ref 0–6)
ETHANOL SERPL-MCNC: <10 MG/DL — SIGNIFICANT CHANGE UP
FLUAV AG NPH QL: SIGNIFICANT CHANGE UP
FLUBV AG NPH QL: SIGNIFICANT CHANGE UP
GLUCOSE SERPL-MCNC: 310 MG/DL — HIGH (ref 70–99)
GLUCOSE SERPL-MCNC: 342 MG/DL — HIGH (ref 70–99)
GLUCOSE UR QL: >=1000 MG/DL
HCO3 BLDV-SCNC: 36 MMOL/L — HIGH (ref 22–29)
HCT VFR BLD CALC: 36.7 % — LOW (ref 39–50)
HGB BLD-MCNC: 11.8 G/DL — LOW (ref 13–17)
IANC: 7.21 K/UL — SIGNIFICANT CHANGE UP (ref 1.8–7.4)
IMM GRANULOCYTES NFR BLD AUTO: 1.5 % — HIGH (ref 0–0.9)
KETONES UR-MCNC: NEGATIVE MG/DL — SIGNIFICANT CHANGE UP
LEUKOCYTE ESTERASE UR-ACNC: ABNORMAL
LIDOCAIN IGE QN: 15 U/L — SIGNIFICANT CHANGE UP (ref 7–60)
LYMPHOCYTES # BLD AUTO: 1.48 K/UL — SIGNIFICANT CHANGE UP (ref 1–3.3)
LYMPHOCYTES # BLD AUTO: 15.8 % — SIGNIFICANT CHANGE UP (ref 13–44)
MAGNESIUM SERPL-MCNC: 1.9 MG/DL — SIGNIFICANT CHANGE UP (ref 1.6–2.6)
MCHC RBC-ENTMCNC: 28.3 PG — SIGNIFICANT CHANGE UP (ref 27–34)
MCHC RBC-ENTMCNC: 32.2 GM/DL — SIGNIFICANT CHANGE UP (ref 32–36)
MCV RBC AUTO: 88 FL — SIGNIFICANT CHANGE UP (ref 80–100)
METHADONE UR-MCNC: NEGATIVE — SIGNIFICANT CHANGE UP
MONOCYTES # BLD AUTO: 0.47 K/UL — SIGNIFICANT CHANGE UP (ref 0–0.9)
MONOCYTES NFR BLD AUTO: 5 % — SIGNIFICANT CHANGE UP (ref 2–14)
NEUTROPHILS # BLD AUTO: 7.21 K/UL — SIGNIFICANT CHANGE UP (ref 1.8–7.4)
NEUTROPHILS NFR BLD AUTO: 77.2 % — HIGH (ref 43–77)
NITRITE UR-MCNC: NEGATIVE — SIGNIFICANT CHANGE UP
NRBC # BLD: 0 /100 WBCS — SIGNIFICANT CHANGE UP (ref 0–0)
NRBC # FLD: 0 K/UL — SIGNIFICANT CHANGE UP (ref 0–0)
OPIATES UR-MCNC: NEGATIVE — SIGNIFICANT CHANGE UP
OXYCODONE UR-MCNC: POSITIVE
PCO2 BLDV: 69 MMHG — HIGH (ref 42–55)
PCP SPEC-MCNC: SIGNIFICANT CHANGE UP
PCP UR-MCNC: NEGATIVE — SIGNIFICANT CHANGE UP
PH BLDV: 7.32 — SIGNIFICANT CHANGE UP (ref 7.32–7.43)
PH UR: 6.5 — SIGNIFICANT CHANGE UP (ref 5–8)
PHOSPHATE SERPL-MCNC: 3.5 MG/DL — SIGNIFICANT CHANGE UP (ref 2.5–4.5)
PLATELET # BLD AUTO: 225 K/UL — SIGNIFICANT CHANGE UP (ref 150–400)
PO2 BLDV: 31 MMHG — SIGNIFICANT CHANGE UP (ref 25–45)
POTASSIUM SERPL-MCNC: 4.7 MMOL/L — SIGNIFICANT CHANGE UP (ref 3.5–5.3)
POTASSIUM SERPL-MCNC: 6.1 MMOL/L — HIGH (ref 3.5–5.3)
POTASSIUM SERPL-SCNC: 4.7 MMOL/L — SIGNIFICANT CHANGE UP (ref 3.5–5.3)
POTASSIUM SERPL-SCNC: 6.1 MMOL/L — HIGH (ref 3.5–5.3)
PROT SERPL-MCNC: 6.8 G/DL — SIGNIFICANT CHANGE UP (ref 6–8.3)
PROT UR-MCNC: NEGATIVE MG/DL — SIGNIFICANT CHANGE UP
RBC # BLD: 4.17 M/UL — LOW (ref 4.2–5.8)
RBC # FLD: 13.6 % — SIGNIFICANT CHANGE UP (ref 10.3–14.5)
RBC CASTS # UR COMP ASSIST: 0 /HPF — SIGNIFICANT CHANGE UP (ref 0–4)
REVIEW: SIGNIFICANT CHANGE UP
RSV RNA NPH QL NAA+NON-PROBE: SIGNIFICANT CHANGE UP
SALICYLATES SERPL-MCNC: <0.3 MG/DL — LOW (ref 15–30)
SAO2 % BLDV: 38.9 % — LOW (ref 67–88)
SARS-COV-2 RNA SPEC QL NAA+PROBE: SIGNIFICANT CHANGE UP
SODIUM SERPL-SCNC: 135 MMOL/L — SIGNIFICANT CHANGE UP (ref 135–145)
SODIUM SERPL-SCNC: 135 MMOL/L — SIGNIFICANT CHANGE UP (ref 135–145)
SP GR SPEC: 1.02 — SIGNIFICANT CHANGE UP (ref 1–1.03)
SQUAMOUS # UR AUTO: 0 /HPF — SIGNIFICANT CHANGE UP (ref 0–5)
THC UR QL: NEGATIVE — SIGNIFICANT CHANGE UP
TOXICOLOGY SCREEN, DRUGS OF ABUSE, SERUM RESULT: SIGNIFICANT CHANGE UP
TSH SERPL-MCNC: 0.38 UIU/ML — SIGNIFICANT CHANGE UP (ref 0.27–4.2)
UROBILINOGEN FLD QL: 0.2 MG/DL — SIGNIFICANT CHANGE UP (ref 0.2–1)
WBC # BLD: 9.35 K/UL — SIGNIFICANT CHANGE UP (ref 3.8–10.5)
WBC # FLD AUTO: 9.35 K/UL — SIGNIFICANT CHANGE UP (ref 3.8–10.5)
WBC UR QL: 277 /HPF — HIGH (ref 0–5)
YEAST-LIKE CELLS: PRESENT

## 2024-01-19 PROCEDURE — 99223 1ST HOSP IP/OBS HIGH 75: CPT

## 2024-01-19 PROCEDURE — 99285 EMERGENCY DEPT VISIT HI MDM: CPT

## 2024-01-19 PROCEDURE — 93010 ELECTROCARDIOGRAM REPORT: CPT

## 2024-01-19 PROCEDURE — 71045 X-RAY EXAM CHEST 1 VIEW: CPT | Mod: 26

## 2024-01-19 RX ORDER — FINASTERIDE 5 MG/1
5 TABLET, FILM COATED ORAL DAILY
Refills: 0 | Status: DISCONTINUED | OUTPATIENT
Start: 2024-01-19 | End: 2024-01-22

## 2024-01-19 RX ORDER — INSULIN GLARGINE 100 [IU]/ML
22 INJECTION, SOLUTION SUBCUTANEOUS AT BEDTIME
Refills: 0 | Status: DISCONTINUED | OUTPATIENT
Start: 2024-01-20 | End: 2024-01-22

## 2024-01-19 RX ORDER — OXYCODONE HYDROCHLORIDE 5 MG/1
5 TABLET ORAL EVERY 8 HOURS
Refills: 0 | Status: DISCONTINUED | OUTPATIENT
Start: 2024-01-19 | End: 2024-01-22

## 2024-01-19 RX ORDER — KETOROLAC TROMETHAMINE 30 MG/ML
15 SYRINGE (ML) INJECTION ONCE
Refills: 0 | Status: DISCONTINUED | OUTPATIENT
Start: 2024-01-19 | End: 2024-01-19

## 2024-01-19 RX ORDER — DEXTROSE 50 % IN WATER 50 %
15 SYRINGE (ML) INTRAVENOUS ONCE
Refills: 0 | Status: DISCONTINUED | OUTPATIENT
Start: 2024-01-19 | End: 2024-01-22

## 2024-01-19 RX ORDER — ALPRAZOLAM 0.25 MG
0.5 TABLET ORAL THREE TIMES A DAY
Refills: 0 | Status: DISCONTINUED | OUTPATIENT
Start: 2024-01-19 | End: 2024-01-22

## 2024-01-19 RX ORDER — OXYCODONE AND ACETAMINOPHEN 5; 325 MG/1; MG/1
1 TABLET ORAL ONCE
Refills: 0 | Status: DISCONTINUED | OUTPATIENT
Start: 2024-01-19 | End: 2024-01-19

## 2024-01-19 RX ORDER — INSULIN LISPRO 100/ML
10 VIAL (ML) SUBCUTANEOUS
Refills: 0 | Status: DISCONTINUED | OUTPATIENT
Start: 2024-01-19 | End: 2024-01-22

## 2024-01-19 RX ORDER — CEFTRIAXONE 500 MG/1
1000 INJECTION, POWDER, FOR SOLUTION INTRAMUSCULAR; INTRAVENOUS ONCE
Refills: 0 | Status: COMPLETED | OUTPATIENT
Start: 2024-01-19 | End: 2024-01-19

## 2024-01-19 RX ORDER — ACETAMINOPHEN 500 MG
650 TABLET ORAL EVERY 6 HOURS
Refills: 0 | Status: DISCONTINUED | OUTPATIENT
Start: 2024-01-19 | End: 2024-01-22

## 2024-01-19 RX ORDER — ASPIRIN/CALCIUM CARB/MAGNESIUM 324 MG
81 TABLET ORAL DAILY
Refills: 0 | Status: DISCONTINUED | OUTPATIENT
Start: 2024-01-19 | End: 2024-01-22

## 2024-01-19 RX ORDER — GABAPENTIN 400 MG/1
100 CAPSULE ORAL THREE TIMES A DAY
Refills: 0 | Status: DISCONTINUED | OUTPATIENT
Start: 2024-01-19 | End: 2024-01-22

## 2024-01-19 RX ORDER — DEXTROSE 50 % IN WATER 50 %
25 SYRINGE (ML) INTRAVENOUS ONCE
Refills: 0 | Status: DISCONTINUED | OUTPATIENT
Start: 2024-01-19 | End: 2024-01-22

## 2024-01-19 RX ORDER — INSULIN LISPRO 100/ML
5 VIAL (ML) SUBCUTANEOUS ONCE
Refills: 0 | Status: COMPLETED | OUTPATIENT
Start: 2024-01-19 | End: 2024-01-19

## 2024-01-19 RX ORDER — SODIUM CHLORIDE 9 MG/ML
1000 INJECTION, SOLUTION INTRAVENOUS
Refills: 0 | Status: DISCONTINUED | OUTPATIENT
Start: 2024-01-19 | End: 2024-01-22

## 2024-01-19 RX ORDER — INSULIN LISPRO 100/ML
VIAL (ML) SUBCUTANEOUS
Refills: 0 | Status: DISCONTINUED | OUTPATIENT
Start: 2024-01-19 | End: 2024-01-22

## 2024-01-19 RX ORDER — BUDESONIDE AND FORMOTEROL FUMARATE DIHYDRATE 160; 4.5 UG/1; UG/1
2 AEROSOL RESPIRATORY (INHALATION)
Refills: 0 | Status: DISCONTINUED | OUTPATIENT
Start: 2024-01-19 | End: 2024-01-22

## 2024-01-19 RX ORDER — SODIUM CHLORIDE 9 MG/ML
500 INJECTION INTRAMUSCULAR; INTRAVENOUS; SUBCUTANEOUS ONCE
Refills: 0 | Status: COMPLETED | OUTPATIENT
Start: 2024-01-19 | End: 2024-01-19

## 2024-01-19 RX ORDER — TRAZODONE HCL 50 MG
150 TABLET ORAL AT BEDTIME
Refills: 0 | Status: DISCONTINUED | OUTPATIENT
Start: 2024-01-19 | End: 2024-01-22

## 2024-01-19 RX ORDER — LANOLIN ALCOHOL/MO/W.PET/CERES
6 CREAM (GRAM) TOPICAL AT BEDTIME
Refills: 0 | Status: DISCONTINUED | OUTPATIENT
Start: 2024-01-19 | End: 2024-01-22

## 2024-01-19 RX ORDER — SENNA PLUS 8.6 MG/1
2 TABLET ORAL AT BEDTIME
Refills: 0 | Status: DISCONTINUED | OUTPATIENT
Start: 2024-01-19 | End: 2024-01-22

## 2024-01-19 RX ORDER — ATORVASTATIN CALCIUM 80 MG/1
40 TABLET, FILM COATED ORAL AT BEDTIME
Refills: 0 | Status: DISCONTINUED | OUTPATIENT
Start: 2024-01-19 | End: 2024-01-22

## 2024-01-19 RX ORDER — POLYETHYLENE GLYCOL 3350 17 G/17G
17 POWDER, FOR SOLUTION ORAL DAILY
Refills: 0 | Status: DISCONTINUED | OUTPATIENT
Start: 2024-01-19 | End: 2024-01-22

## 2024-01-19 RX ORDER — INSULIN GLARGINE 100 [IU]/ML
22 INJECTION, SOLUTION SUBCUTANEOUS ONCE
Refills: 0 | Status: COMPLETED | OUTPATIENT
Start: 2024-01-19 | End: 2024-01-19

## 2024-01-19 RX ORDER — PANTOPRAZOLE SODIUM 20 MG/1
40 TABLET, DELAYED RELEASE ORAL
Refills: 0 | Status: DISCONTINUED | OUTPATIENT
Start: 2024-01-19 | End: 2024-01-22

## 2024-01-19 RX ORDER — TAMSULOSIN HYDROCHLORIDE 0.4 MG/1
0.4 CAPSULE ORAL AT BEDTIME
Refills: 0 | Status: DISCONTINUED | OUTPATIENT
Start: 2024-01-19 | End: 2024-01-22

## 2024-01-19 RX ORDER — NICOTINE POLACRILEX 2 MG
1 GUM BUCCAL DAILY
Refills: 0 | Status: DISCONTINUED | OUTPATIENT
Start: 2024-01-19 | End: 2024-01-22

## 2024-01-19 RX ORDER — IPRATROPIUM/ALBUTEROL SULFATE 18-103MCG
3 AEROSOL WITH ADAPTER (GRAM) INHALATION EVERY 6 HOURS
Refills: 0 | Status: DISCONTINUED | OUTPATIENT
Start: 2024-01-19 | End: 2024-01-22

## 2024-01-19 RX ORDER — GLUCAGON INJECTION, SOLUTION 0.5 MG/.1ML
1 INJECTION, SOLUTION SUBCUTANEOUS ONCE
Refills: 0 | Status: DISCONTINUED | OUTPATIENT
Start: 2024-01-19 | End: 2024-01-22

## 2024-01-19 RX ORDER — INSULIN LISPRO 100/ML
VIAL (ML) SUBCUTANEOUS AT BEDTIME
Refills: 0 | Status: DISCONTINUED | OUTPATIENT
Start: 2024-01-19 | End: 2024-01-22

## 2024-01-19 RX ORDER — LATANOPROST 0.05 MG/ML
1 SOLUTION/ DROPS OPHTHALMIC; TOPICAL AT BEDTIME
Refills: 0 | Status: DISCONTINUED | OUTPATIENT
Start: 2024-01-19 | End: 2024-01-22

## 2024-01-19 RX ADMIN — CEFTRIAXONE 100 MILLIGRAM(S): 500 INJECTION, POWDER, FOR SOLUTION INTRAMUSCULAR; INTRAVENOUS at 21:44

## 2024-01-19 RX ADMIN — Medication 5 UNIT(S): at 22:50

## 2024-01-19 RX ADMIN — SODIUM CHLORIDE 500 MILLILITER(S): 9 INJECTION INTRAMUSCULAR; INTRAVENOUS; SUBCUTANEOUS at 19:56

## 2024-01-19 RX ADMIN — OXYCODONE AND ACETAMINOPHEN 1 TABLET(S): 5; 325 TABLET ORAL at 20:00

## 2024-01-19 RX ADMIN — OXYCODONE AND ACETAMINOPHEN 1 TABLET(S): 5; 325 TABLET ORAL at 19:02

## 2024-01-19 RX ADMIN — Medication 15 MILLIGRAM(S): at 21:44

## 2024-01-19 NOTE — ED ADULT NURSE NOTE - NS PRO PASSIVE SMOKE EXP
[FreeTextEntry1] : 26 year old man with no significant past medical history here for evaluation of mild elevation in anticardiolipin antibody. PT, INR, PTT all within normal limits.  DRVVT and B2 glycoprotein were all negative.  Elevated anticardiolipin antibody is likely due to inflammation post COVID infection and is not clinically significant. Patient denies any history of DVT/PE.  \par \par Plan:\par Continue Aspirin as per neurology. \par Return to office as needed. \par Case and management discussed with Dr. Bradley Goldberg. \par \par  No

## 2024-01-19 NOTE — ED ADULT NURSE NOTE - CHIEF COMPLAINT QUOTE
Pt arrives from [facility] for having hallucination and aggressive behavior.  Patient denies any medical issues. Patient on 3L NC at baseline. Patient cursing IN TRIAGE. Patient going to [facility] due to 02. PHX DM.  aggressive behavior. Unable to go to  due to OXYGEN TANK.    1759: Pt is very pleasant throughout triage, eating no complaints.

## 2024-01-19 NOTE — H&P ADULT - NSHPPHYSICALEXAM_GEN_ALL_CORE
VITAL SIGNS:  T(C): 36.9 (01-20-24 @ 00:29), Max: 36.9 (01-19-24 @ 18:01)  T(F): 98.4 (01-20-24 @ 00:29), Max: 98.4 (01-19-24 @ 18:01)  HR: 78 (01-20-24 @ 00:29) (78 - 89)  BP: 133/84 (01-20-24 @ 00:29) (116/71 - 156/89)  BP(mean): --  RR: 17 (01-20-24 @ 00:29) (16 - 18)  SpO2: 99% (01-20-24 @ 00:29) (98% - 100%)  Wt(kg): --    PHYSICAL EXAM:    Constitutional: resting comfortably in bed; NAD  Head: NC/AT  Eyes: PERRL, EOMI, anicteric sclera  ENT: no nasal discharge; uvula midline, no oropharyngeal erythema or exudates; MMM  Neck: supple  Respiratory: CTA B/L; no W/R/R, no retractions  Cardiac: +S1/S2; RRR; no M/R/G  Gastrointestinal: abdomen soft, NT/ND; no rebound or guarding; +BSx4  Back: spine midline, no bony tenderness  Extremities: WWP, no clubbing or cyanosis; no peripheral edema  Musculoskeletal: NROM x4; no joint swelling, tenderness or erythema  Vascular: distal pulses intact  Dermatologic: skin warm, dry and intact; no rashes  Lymphatic: no submandibular or cervical LAD  Neurologic: AAOx3; moves all 4 extremities  Psychiatric: affect and characteristics of appearance, verbalizations, behaviors are appropriate

## 2024-01-19 NOTE — ED PROVIDER NOTE - CLINICAL SUMMARY MEDICAL DECISION MAKING FREE TEXT BOX
63-year-old male with past medical history of hypertension, hyperlipidemia, diabetes, CHF, COPD on 3 L nasal cannula at baseline, insomnia, depression, GERD, nicotine dependence, BPH, diabetic neuropathy complaining of 1 day history of delusions.  Patient was sent from his nursing home because he has been having auditory hallucinations.  When I examined the patient, the patient stated that he heard my voice threw up with vertigo at his nursing.  According to triage note, patient was aggressive with nursing home staff, prompting ED arrival.  Patient is currently pleasant and has no complaints. Denies fevers, chills, nausea, vomiting, dizziness, chest pain, SOB, abdominal pain, dysuria, hematuria. Benign physical, no active SI/HI, hallucinations. Will draw psych clearance labs and reassess w/ psych consult.

## 2024-01-19 NOTE — H&P ADULT - PROBLEM SELECTOR PLAN 6
Pt on novolin 70/30 38 units QAM and 34 units QPM  - will start lantus 22 units QHS and admelog 10 units TID w/ meals w/ low dose ISS

## 2024-01-19 NOTE — H&P ADULT - ASSESSMENT
Pt is a 62 y/o M w/ with a PMH of CAD, CHF, CYNTHIA on nocturnal bipap, COPD (multiple intubations in past on 3L of home O2), peripheral neuropathy, GERD, HTN, HLD, obesity, polyarthritis, Pulmonary HTN, TIA presents with increased agitation and hallucination at NH today. Pt admitted for further evaluation and management.

## 2024-01-19 NOTE — ED PROVIDER NOTE - OBJECTIVE STATEMENT
63-year-old male with past medical history of hypertension, hyperlipidemia, diabetes, CHF, COPD on 3 L nasal cannula at baseline, insomnia, depression, GERD, nicotine dependence, BPH, diabetic neuropathy complaining of 1 day history of delusions.  Patient was sent from his nursing home because he has been having auditory hallucinations.  When I examined the patient, the patient stated that he heard my voice threw up with vertigo at his nursing.  According to triage note, patient was aggressive with nursing home staff, prompting ED arrival.  Patient is currently pleasant and has no complaints. Denies fevers, chills, nausea, vomiting, dizziness, chest pain, SOB, abdominal pain, dysuria, hematuria.

## 2024-01-19 NOTE — H&P ADULT - PROBLEM SELECTOR PLAN 1
Pt presenting with worsening agitation, pt hearing voices and with paranoia. He states he thinks someone by the name at [facility] at [facility] has been able to hack into his phone conversations. He denies any SI.   - psych eval in AM

## 2024-01-19 NOTE — ED ADULT TRIAGE NOTE - CHIEF COMPLAINT QUOTE
Pt arrives from [facility] for having hallucination and aggressive behavior.  Patient denies any medical issues. Patient on 3L NC at baseline. Patient cursing IN TRIAGE. Patient going to [facility] due to 02. PHX DM.  aggressive behavior. Unable to go to  due to OXYGEN TANK. Pt arrives from [facility] for having hallucination and aggressive behavior.  Patient denies any medical issues. Patient on 3L NC at baseline. Patient cursing IN TRIAGE. Patient going to [facility] due to 02. PHX DM.  aggressive behavior. Unable to go to  due to OXYGEN TANK.    1759: Pt is very pleasant throughout triage, eating no complaints.

## 2024-01-19 NOTE — ED PROVIDER NOTE - PROGRESS NOTE DETAILS
Major BAIG: Attempted to reach out to [facility], supervisor/RN was unavailable to answer the phone. Major BAIG: Repeat EKG is unchanged. Will admit for AMS and UTI w/ auditory hallucinations

## 2024-01-19 NOTE — ED ADULT NURSE NOTE - OBJECTIVE STATEMENT
Patient is a 64 yo male, phx CHF, HTN, HLD, DM2, CAD, bipolar, COPD on 2.5L home O2, GERD, depression, presenting with altered mental status and aggressive behavior from [facility]. Patient AAOx3, no signs of distress, reports "I am just repeating what I am hearing in my bluetooth device and everyone thinks it is me." "This man in the walkie talkie needs to be brought to justice." Denies fever, chest pain, SOB, nausea, vomiting, diarrhea, urinary symptoms. Received on home O2. VSS. MD evaluation in progress. Fall precautions maintained. Patient is a 64 yo male, phx CHF, HTN, HLD, DM2, CAD, bipolar, COPD on 2.5L home O2, GERD, depression, presenting with altered mental status and aggressive behavior from [facility]. Patient AAOx3, no signs of distress, reports "I am just repeating what I am hearing in my bluetooth device and everyone thinks it is me." "This man in the walkie talkie needs to be brought to justice." Denies fever, chest pain, SOB, nausea, vomiting, diarrhea, urinary symptoms. Received on home O2. VSS. 20G PIV placed to LAC, labs sent per orders. Pending x-ray. Fall precautions maintained.

## 2024-01-19 NOTE — H&P ADULT - HISTORY OF PRESENT ILLNESS
Pt is a 62 y/o M w/ hx of COPD on 3L NC, active smoker, with a PMH of CAD, CHF on nocturnal AVAPS-AE (Trilogy), COPD ( MULTIPLE intubations in past on 3L of home O2), peripheral neuropathy, GERD, HTN, HLD, obesity, polyarthritis, Pulmonary HTN, TIA presents with increased agitation and hallucination at NH today.  Pt is a 62 y/o M w/ with a PMH of CAD, CHF, CYNTHIA on nocturnal bipap, COPD (multiple intubations in past on 3L of home O2), peripheral neuropathy, GERD, HTN, HLD, obesity, polyarthritis, Pulmonary HTN, TIA presents with increased agitation and hallucination at NH today.     In ED, vitals T, HR, BP, RR, O2 sat  Labs significant for  EKG personally reviewed  CXR:  Imaging:  ED management: Pt is a 64 y/o M w/ with a PMH of CAD, CHF, CYNTHIA on nocturnal bipap, COPD (multiple intubations in past on 3L of home O2), peripheral neuropathy, GERD, HTN, HLD, obesity, polyarthritis, Pulmonary HTN, TIA presents with increased agitation and hallucination at NH today. Pt states he was talking on his phone when a man by the name of Jose G Crowellaniels intercepted his phone call with a two way walkie talkie. He states he feels like this person has tapped into all of the computer and security systems at NH and at the hospital. He states that made him upset and agitated which caused the nursing home to send him to the hospital. He denies any other symptoms of chest pain, SOB, abd pain, n/v/d, headache, visual disturbances. He denies any SI or thoughts of hurting anyone.     In ED, vitals T 98.1, HR 89, /89, RR 18, O2 sat 100% on RA --> 98% on 3L  Labs significant for: glucose of 342, K 6.1 (moderately hemolyzed) --> 4.7, PCO2 69, U/A positive for leuk est, pyuria, occasional bacteria  EKG personally reviewed: pending  CXR: no focal consolidation  ED management: IV ceftriaxone, percocet, 500 cc NS

## 2024-01-19 NOTE — H&P ADULT - NSICDXPASTMEDICALHX_GEN_ALL_CORE_FT
PAST MEDICAL HISTORY:  Bipolar disorder     CAD (coronary artery disease)     COPD (chronic obstructive pulmonary disease)     DM (diabetes mellitus)     HTN (hypertension)     CYNTHIA treated with BiPAP

## 2024-01-19 NOTE — H&P ADULT - PROBLEM SELECTOR PLAN 2
U/A positive, pt endorsing some mild dysuria. May possibly be exacerbating hallucinations. s/p IV ceftriaxone in ED.  - c/w IV ceftriaxone  - f/u urcx

## 2024-01-19 NOTE — ED PROVIDER NOTE - ATTENDING CONTRIBUTION TO CARE
Attending Statement: I have personally seen and examined this patient. I have fully participated in the care of this patient. I have reviewed all pertinent clinical information, including history physical exam, plan and the Resident's note and agree except as noted  63-year-old male with past medical history of hypertension, hyperlipidemia, diabetes, CHF, COPD on 3 L nasal cannula at baseline, insomnia, depression, GERD, nicotine dependence, BPH, diabetic neuropathySent in from nursing home for aggressive behavior.  Patient calm and cooperative in the emergency department.  Does endorse that he "acted out in nursing home" patient believes that "there to 2mar-theresaie's" R "hearing his conversations and talking back to him" denies any command hallucinations no SI or HI  On review of system patient denies any chest pain, shortness of breath, coughing.  Is on baseline oxygen 3 L history of smoking in the past.  Denies any recent fever no abdominal pain no nausea no vomiting only endorsing chronic back pain for which "I need  my medication" per chart review patient is on Percocet standing order.  Denies any dysuria, urgency or frequency.  Vital signs noted  Patient laying in bed alert and oriented cooperative no work of breathing.  Mild expiratory wheezing but no work of breathing.  Soft nontender abdomen.  Moving all extremities.  Gait not tested.  Plan EKG, cardiac monitor, labs rule out DKA patient hyperglycemic in the emergency department, pain meds and reassess 7:50 PM EKG was done unchanged from a prior 1 from December 2023 patient has no chest pain or troponin and CK-MB for completion

## 2024-01-19 NOTE — H&P ADULT - TIME BILLING
I have spent a total of greater than 76 minutes time spent to prepare to see the patient, obtaining and reviewing history, physical examination, explaining the diagnosis, prognosis and treatment plan with the patient/family/caregiver. I also have spent the time ordering studies and testing, interpreting results, medicine reconciliation, and documentation as above.

## 2024-01-19 NOTE — H&P ADULT - PROBLEM SELECTOR PLAN 4
Not in exacerbation, on baseline 3L O2. Per NH records, pt started on prednisone taper for COPD exacerbation 1/10.   - c/w prednisone 10 mg QD for 5 days to completion  - symbicort BID  - duonebs q6h

## 2024-01-19 NOTE — H&P ADULT - NSHPREVIEWOFSYSTEMS_GEN_ALL_CORE
REVIEW OF SYSTEMS:    CONSTITUTIONAL: No weakness, fevers or chills  EYES/ENT: No visual changes;  No vertigo or throat pain   NECK: No pain or stiffness  RESPIRATORY: No cough, wheezing, hemoptysis; No shortness of breath  CARDIOVASCULAR: No chest pain or palpitations  GASTROINTESTINAL: No abdominal or epigastric pain. No nausea, vomiting, or hematemesis; No diarrhea or constipation. No melena or hematochezia.  GENITOURINARY: No dysuria, frequency or hematuria  NEUROLOGICAL: No numbness or weakness  Psych: +hallucinations  SKIN: No itching, rashes

## 2024-01-20 DIAGNOSIS — Z29.9 ENCOUNTER FOR PROPHYLACTIC MEASURES, UNSPECIFIED: ICD-10-CM

## 2024-01-20 DIAGNOSIS — G47.33 OBSTRUCTIVE SLEEP APNEA (ADULT) (PEDIATRIC): ICD-10-CM

## 2024-01-20 DIAGNOSIS — M54.9 DORSALGIA, UNSPECIFIED: ICD-10-CM

## 2024-01-20 DIAGNOSIS — N39.0 URINARY TRACT INFECTION, SITE NOT SPECIFIED: ICD-10-CM

## 2024-01-20 DIAGNOSIS — J44.9 CHRONIC OBSTRUCTIVE PULMONARY DISEASE, UNSPECIFIED: ICD-10-CM

## 2024-01-20 DIAGNOSIS — F31.9 BIPOLAR DISORDER, UNSPECIFIED: ICD-10-CM

## 2024-01-20 DIAGNOSIS — E11.9 TYPE 2 DIABETES MELLITUS WITHOUT COMPLICATIONS: ICD-10-CM

## 2024-01-20 DIAGNOSIS — I10 ESSENTIAL (PRIMARY) HYPERTENSION: ICD-10-CM

## 2024-01-20 DIAGNOSIS — R44.3 HALLUCINATIONS, UNSPECIFIED: ICD-10-CM

## 2024-01-20 DIAGNOSIS — I25.10 ATHEROSCLEROTIC HEART DISEASE OF NATIVE CORONARY ARTERY WITHOUT ANGINA PECTORIS: ICD-10-CM

## 2024-01-20 LAB
A1C WITH ESTIMATED AVERAGE GLUCOSE RESULT: 9.4 % — HIGH (ref 4–5.6)
ANION GAP SERPL CALC-SCNC: 9 MMOL/L — SIGNIFICANT CHANGE UP (ref 7–14)
BASOPHILS # BLD AUTO: 0.04 K/UL — SIGNIFICANT CHANGE UP (ref 0–0.2)
BASOPHILS NFR BLD AUTO: 0.4 % — SIGNIFICANT CHANGE UP (ref 0–2)
BUN SERPL-MCNC: 25 MG/DL — HIGH (ref 7–23)
CALCIUM SERPL-MCNC: 8.3 MG/DL — LOW (ref 8.4–10.5)
CHLORIDE SERPL-SCNC: 99 MMOL/L — SIGNIFICANT CHANGE UP (ref 98–107)
CHOLEST SERPL-MCNC: 111 MG/DL — SIGNIFICANT CHANGE UP
CO2 SERPL-SCNC: 28 MMOL/L — SIGNIFICANT CHANGE UP (ref 22–31)
CREAT SERPL-MCNC: 0.67 MG/DL — SIGNIFICANT CHANGE UP (ref 0.5–1.3)
EGFR: 106 ML/MIN/1.73M2 — SIGNIFICANT CHANGE UP
EOSINOPHIL # BLD AUTO: 0.08 K/UL — SIGNIFICANT CHANGE UP (ref 0–0.5)
EOSINOPHIL NFR BLD AUTO: 0.8 % — SIGNIFICANT CHANGE UP (ref 0–6)
ESTIMATED AVERAGE GLUCOSE: 223 — SIGNIFICANT CHANGE UP
GLUCOSE BLDC GLUCOMTR-MCNC: 148 MG/DL — HIGH (ref 70–99)
GLUCOSE BLDC GLUCOMTR-MCNC: 167 MG/DL — HIGH (ref 70–99)
GLUCOSE BLDC GLUCOMTR-MCNC: 169 MG/DL — HIGH (ref 70–99)
GLUCOSE BLDC GLUCOMTR-MCNC: 188 MG/DL — HIGH (ref 70–99)
GLUCOSE BLDC GLUCOMTR-MCNC: 207 MG/DL — HIGH (ref 70–99)
GLUCOSE BLDC GLUCOMTR-MCNC: 302 MG/DL — HIGH (ref 70–99)
GLUCOSE SERPL-MCNC: 172 MG/DL — HIGH (ref 70–99)
HCT VFR BLD CALC: 34.5 % — LOW (ref 39–50)
HCV AB S/CO SERPL IA: 0.16 S/CO — SIGNIFICANT CHANGE UP (ref 0–0.99)
HCV AB SERPL-IMP: SIGNIFICANT CHANGE UP
HDLC SERPL-MCNC: 36 MG/DL — LOW
HGB BLD-MCNC: 11 G/DL — LOW (ref 13–17)
IANC: 6.21 K/UL — SIGNIFICANT CHANGE UP (ref 1.8–7.4)
IMM GRANULOCYTES NFR BLD AUTO: 1.5 % — HIGH (ref 0–0.9)
LIPID PNL WITH DIRECT LDL SERPL: 42 MG/DL — SIGNIFICANT CHANGE UP
LYMPHOCYTES # BLD AUTO: 2.36 K/UL — SIGNIFICANT CHANGE UP (ref 1–3.3)
LYMPHOCYTES # BLD AUTO: 24.3 % — SIGNIFICANT CHANGE UP (ref 13–44)
MAGNESIUM SERPL-MCNC: 1.8 MG/DL — SIGNIFICANT CHANGE UP (ref 1.6–2.6)
MCHC RBC-ENTMCNC: 28.2 PG — SIGNIFICANT CHANGE UP (ref 27–34)
MCHC RBC-ENTMCNC: 31.9 GM/DL — LOW (ref 32–36)
MCV RBC AUTO: 88.5 FL — SIGNIFICANT CHANGE UP (ref 80–100)
MONOCYTES # BLD AUTO: 0.86 K/UL — SIGNIFICANT CHANGE UP (ref 0–0.9)
MONOCYTES NFR BLD AUTO: 8.9 % — SIGNIFICANT CHANGE UP (ref 2–14)
NEUTROPHILS # BLD AUTO: 6.21 K/UL — SIGNIFICANT CHANGE UP (ref 1.8–7.4)
NEUTROPHILS NFR BLD AUTO: 64.1 % — SIGNIFICANT CHANGE UP (ref 43–77)
NON HDL CHOLESTEROL: 75 MG/DL — SIGNIFICANT CHANGE UP
NRBC # BLD: 0 /100 WBCS — SIGNIFICANT CHANGE UP (ref 0–0)
NRBC # FLD: 0 K/UL — SIGNIFICANT CHANGE UP (ref 0–0)
PHOSPHATE SERPL-MCNC: 4.2 MG/DL — SIGNIFICANT CHANGE UP (ref 2.5–4.5)
PLATELET # BLD AUTO: 192 K/UL — SIGNIFICANT CHANGE UP (ref 150–400)
POTASSIUM SERPL-MCNC: 4.5 MMOL/L — SIGNIFICANT CHANGE UP (ref 3.5–5.3)
POTASSIUM SERPL-SCNC: 4.5 MMOL/L — SIGNIFICANT CHANGE UP (ref 3.5–5.3)
RBC # BLD: 3.9 M/UL — LOW (ref 4.2–5.8)
RBC # FLD: 13.8 % — SIGNIFICANT CHANGE UP (ref 10.3–14.5)
SODIUM SERPL-SCNC: 136 MMOL/L — SIGNIFICANT CHANGE UP (ref 135–145)
TRIGL SERPL-MCNC: 165 MG/DL — HIGH
WBC # BLD: 9.7 K/UL — SIGNIFICANT CHANGE UP (ref 3.8–10.5)
WBC # FLD AUTO: 9.7 K/UL — SIGNIFICANT CHANGE UP (ref 3.8–10.5)

## 2024-01-20 PROCEDURE — 90792 PSYCH DIAG EVAL W/MED SRVCS: CPT

## 2024-01-20 PROCEDURE — 99232 SBSQ HOSP IP/OBS MODERATE 35: CPT

## 2024-01-20 RX ORDER — ENOXAPARIN SODIUM 100 MG/ML
40 INJECTION SUBCUTANEOUS EVERY 24 HOURS
Refills: 0 | Status: DISCONTINUED | OUTPATIENT
Start: 2024-01-20 | End: 2024-01-22

## 2024-01-20 RX ORDER — LANOLIN ALCOHOL/MO/W.PET/CERES
6 CREAM (GRAM) TOPICAL ONCE
Refills: 0 | Status: COMPLETED | OUTPATIENT
Start: 2024-01-20 | End: 2024-01-20

## 2024-01-20 RX ORDER — CEFTRIAXONE 500 MG/1
1000 INJECTION, POWDER, FOR SOLUTION INTRAMUSCULAR; INTRAVENOUS EVERY 24 HOURS
Refills: 0 | Status: COMPLETED | OUTPATIENT
Start: 2024-01-20 | End: 2024-01-21

## 2024-01-20 RX ORDER — HALOPERIDOL DECANOATE 100 MG/ML
2.5 INJECTION INTRAMUSCULAR EVERY 6 HOURS
Refills: 0 | Status: DISCONTINUED | OUTPATIENT
Start: 2024-01-20 | End: 2024-01-22

## 2024-01-20 RX ADMIN — INSULIN GLARGINE 22 UNIT(S): 100 INJECTION, SOLUTION SUBCUTANEOUS at 02:46

## 2024-01-20 RX ADMIN — Medication 10 UNIT(S): at 17:27

## 2024-01-20 RX ADMIN — Medication 650 MILLIGRAM(S): at 17:27

## 2024-01-20 RX ADMIN — Medication 3 MILLILITER(S): at 23:57

## 2024-01-20 RX ADMIN — ATORVASTATIN CALCIUM 40 MILLIGRAM(S): 80 TABLET, FILM COATED ORAL at 22:38

## 2024-01-20 RX ADMIN — TAMSULOSIN HYDROCHLORIDE 0.4 MILLIGRAM(S): 0.4 CAPSULE ORAL at 22:38

## 2024-01-20 RX ADMIN — Medication 10 MILLIGRAM(S): at 05:20

## 2024-01-20 RX ADMIN — PANTOPRAZOLE SODIUM 40 MILLIGRAM(S): 20 TABLET, DELAYED RELEASE ORAL at 05:21

## 2024-01-20 RX ADMIN — BUDESONIDE AND FORMOTEROL FUMARATE DIHYDRATE 2 PUFF(S): 160; 4.5 AEROSOL RESPIRATORY (INHALATION) at 22:37

## 2024-01-20 RX ADMIN — Medication 3 MILLILITER(S): at 17:57

## 2024-01-20 RX ADMIN — GABAPENTIN 100 MILLIGRAM(S): 400 CAPSULE ORAL at 15:37

## 2024-01-20 RX ADMIN — Medication 10 UNIT(S): at 09:29

## 2024-01-20 RX ADMIN — FINASTERIDE 5 MILLIGRAM(S): 5 TABLET, FILM COATED ORAL at 12:18

## 2024-01-20 RX ADMIN — CEFTRIAXONE 100 MILLIGRAM(S): 500 INJECTION, POWDER, FOR SOLUTION INTRAMUSCULAR; INTRAVENOUS at 22:36

## 2024-01-20 RX ADMIN — INSULIN GLARGINE 22 UNIT(S): 100 INJECTION, SOLUTION SUBCUTANEOUS at 23:30

## 2024-01-20 RX ADMIN — Medication 1: at 09:28

## 2024-01-20 RX ADMIN — ENOXAPARIN SODIUM 40 MILLIGRAM(S): 100 INJECTION SUBCUTANEOUS at 12:18

## 2024-01-20 RX ADMIN — Medication 81 MILLIGRAM(S): at 12:18

## 2024-01-20 RX ADMIN — GABAPENTIN 100 MILLIGRAM(S): 400 CAPSULE ORAL at 22:37

## 2024-01-20 RX ADMIN — Medication 3 MILLILITER(S): at 05:20

## 2024-01-20 RX ADMIN — Medication 650 MILLIGRAM(S): at 18:11

## 2024-01-20 RX ADMIN — OXYCODONE HYDROCHLORIDE 5 MILLIGRAM(S): 5 TABLET ORAL at 01:22

## 2024-01-20 RX ADMIN — OXYCODONE HYDROCHLORIDE 5 MILLIGRAM(S): 5 TABLET ORAL at 19:25

## 2024-01-20 RX ADMIN — Medication 6 MILLIGRAM(S): at 01:21

## 2024-01-20 RX ADMIN — Medication 6 MILLIGRAM(S): at 22:38

## 2024-01-20 RX ADMIN — Medication 0.5 MILLIGRAM(S): at 22:38

## 2024-01-20 RX ADMIN — OXYCODONE HYDROCHLORIDE 5 MILLIGRAM(S): 5 TABLET ORAL at 10:33

## 2024-01-20 RX ADMIN — Medication 0.5 MILLIGRAM(S): at 15:37

## 2024-01-20 RX ADMIN — Medication 10 UNIT(S): at 13:34

## 2024-01-20 RX ADMIN — OXYCODONE HYDROCHLORIDE 5 MILLIGRAM(S): 5 TABLET ORAL at 11:15

## 2024-01-20 RX ADMIN — Medication 0.5 MILLIGRAM(S): at 05:21

## 2024-01-20 RX ADMIN — Medication 0: at 22:39

## 2024-01-20 RX ADMIN — Medication 3 MILLILITER(S): at 10:33

## 2024-01-20 RX ADMIN — SENNA PLUS 2 TABLET(S): 8.6 TABLET ORAL at 22:38

## 2024-01-20 RX ADMIN — BUDESONIDE AND FORMOTEROL FUMARATE DIHYDRATE 2 PUFF(S): 160; 4.5 AEROSOL RESPIRATORY (INHALATION) at 10:33

## 2024-01-20 RX ADMIN — Medication 4: at 13:34

## 2024-01-20 RX ADMIN — GABAPENTIN 100 MILLIGRAM(S): 400 CAPSULE ORAL at 05:20

## 2024-01-20 NOTE — BH CONSULTATION LIAISON ASSESSMENT NOTE - NSBHMSEHYG_PSY_A_CORE
----- Message from Radha Lane sent at 11/27/2023  9:53 AM CST -----  Pt's daughter (Lucio) is asking for a call back, she has questions about her mom's meds  [phone]     Fair

## 2024-01-20 NOTE — BH CONSULTATION LIAISON ASSESSMENT NOTE - HPI (INCLUDE ILLNESS QUALITY, SEVERITY, DURATION, TIMING, CONTEXT, MODIFYING FACTORS, ASSOCIATED SIGNS AND SYMPTOMS)
62 y/o M w/ with a PMH of CAD, CHF, CYNTHIA on nocturnal bipap, COPD (multiple intubations in past on 3L of home O2), peripheral neuropathy, GERD, HTN, HLD, obesity, polyarthritis, Pulmonary HTN, TIA, PPHx bipolar dos, insomnia, presents with increased agitation and hallucination at NH today.     Patient seen, a&o, calm, cooperative, pleasant, endorses mood as "ok," states he is aware he was sent to the hospital for aggression although he denies being physically aggressive only verbally, patient endorses chronic auditory hallucinations-not command in nature, endorses paranoia and delusions-believes other patients are threatening to put "stuff" in his food also endorsing delusion someone is using hand held radios recording voices and playing them back to him. Patient states he feels safe in the hospital and is being treated well, patient denies suicidal/homicidal ideation, denies visual hallucinations.   64 y/o M w/ with a PMH of CAD, CHF, CYNTHIA on nocturnal bipap, COPD (multiple intubations in past on 3L of home O2), peripheral neuropathy, GERD, HTN, HLD, obesity, polyarthritis, Pulmonary HTN, TIA, as per chart PPHx bipolar dos (?), insomnia, presents with increased agitation and hallucination at NH today.     Patient seen, a&o, calm, cooperative, pleasant, endorses mood as "ok," states he is aware he was sent to the hospital for aggression although he denies being physically aggressive only verbally, patient endorses chronic auditory hallucinations-not command in nature, endorses paranoia and delusions-believes other patients are threatening to put "stuff" in his food also endorsing delusion someone is using hand held radios recording voices and playing them back to him. Patient states he feels safe in the hospital and is being treated well, patient denies suicidal/homicidal ideation, denies visual hallucinations.

## 2024-01-20 NOTE — BH CONSULTATION LIAISON ASSESSMENT NOTE - VIOLENCE RISK FACTORS:
Feeling of being under threat and being unable to control threat/Affective dysregulation/Impulsivity/Lack of insight into violence risk/need for treatment/Community stressors that increase the risk of destabilization

## 2024-01-20 NOTE — PROGRESS NOTE ADULT - PROBLEM SELECTOR PLAN 5
Stable. Does not appear to be on any medications.  - continue to monitor BPs - At home, on Percocet 5-325 mg tid  - c/w oxycodone 5 mg tid PRN severe pain  - c/w gabapentin 100 tid

## 2024-01-20 NOTE — BH CONSULTATION LIAISON ASSESSMENT NOTE - NSBHATTESTCOMMENTATTENDFT_PSY_A_CORE
Chart reviewed, patient seen/evaluated virtually with Jade Gee NP,  I agree with above assessment/plan. Patient calm on evaluation, endorses paranoia and delusions, no si or hi. Limited insight. Plan  as above. Will follow

## 2024-01-20 NOTE — PROGRESS NOTE ADULT - PROBLEM SELECTOR PLAN 6
Pt on novolin 70/30 38 units QAM and 34 units QPM  - will start lantus 22 units QHS and admelog 10 units TID w/ meals w/ low dose ISS Stable. Does not appear to be on any medications.  - continue to monitor BPs

## 2024-01-20 NOTE — BH CONSULTATION LIAISON ASSESSMENT NOTE - NSBHCHARTREVIEWVS_PSY_A_CORE FT
Vital Signs Last 24 Hrs  T(C): 36.7 (20 Jan 2024 14:14), Max: 36.9 (19 Jan 2024 18:01)  T(F): 98 (20 Jan 2024 14:14), Max: 98.4 (19 Jan 2024 18:01)  HR: 76 (20 Jan 2024 14:14) (67 - 89)  BP: 125/65 (20 Jan 2024 14:14) (110/62 - 156/89)  BP(mean): --  RR: 17 (20 Jan 2024 14:14) (16 - 18)  SpO2: 98% (20 Jan 2024 14:14) (97% - 100%)    Parameters below as of 20 Jan 2024 14:14  Patient On (Oxygen Delivery Method): nasal cannula  O2 Flow (L/min): 3

## 2024-01-20 NOTE — PROGRESS NOTE ADULT - PROBLEM SELECTOR PLAN 1
Pt presenting with worsening agitation, pt hearing voices and with paranoia. He states he thinks someone by the name at [facility] at [facility] has been able to hack into his phone conversations. He denies any SI.   - psych eval pending

## 2024-01-20 NOTE — BH CONSULTATION LIAISON ASSESSMENT NOTE - RISK ASSESSMENT
Risk Factors: chronic medical conditions, hx bipolar disorder, aggression, delusions, paranoia  Protective Factors: residential stability, positive therapeutic relationship, denies si/sa/hi, denies vh, no access to firearms

## 2024-01-20 NOTE — PROGRESS NOTE ADULT - PROBLEM SELECTOR PLAN 7
c/w nocturnal bipap Pt on novolin 70/30 38 units QAM and 34 units QPM  - will start lantus 22 units QHS and admelog 10 units TID w/ meals w/ low dose ISS

## 2024-01-20 NOTE — BH CONSULTATION LIAISON ASSESSMENT NOTE - CURRENT MEDICATION
MEDICATIONS  (STANDING):  albuterol/ipratropium for Nebulization 3 milliLiter(s) Nebulizer every 6 hours  ALPRAZolam 0.5 milliGRAM(s) Oral three times a day  aspirin  chewable 81 milliGRAM(s) Oral daily  atorvastatin 40 milliGRAM(s) Oral at bedtime  budesonide  80 MICROgram(s)/formoterol 4.5 MICROgram(s) Inhaler 2 Puff(s) Inhalation two times a day  cefTRIAXone   IVPB 1000 milliGRAM(s) IV Intermittent every 24 hours  dextrose 5%. 1000 milliLiter(s) (50 mL/Hr) IV Continuous <Continuous>  dextrose 50% Injectable 25 Gram(s) IV Push once  enoxaparin Injectable 40 milliGRAM(s) SubCutaneous every 24 hours  finasteride 5 milliGRAM(s) Oral daily  gabapentin 100 milliGRAM(s) Oral three times a day  glucagon  Injectable 1 milliGRAM(s) IntraMuscular once  insulin glargine Injectable (LANTUS) 22 Unit(s) SubCutaneous at bedtime  insulin lispro (ADMELOG) corrective regimen sliding scale   SubCutaneous three times a day before meals  insulin lispro (ADMELOG) corrective regimen sliding scale   SubCutaneous at bedtime  insulin lispro Injectable (ADMELOG) 10 Unit(s) SubCutaneous three times a day before meals  latanoprost 0.005% Ophthalmic Solution 1 Drop(s) Both EYES at bedtime  melatonin 6 milliGRAM(s) Oral at bedtime  nicotine -   7 mG/24Hr(s) Patch 1 Patch Transdermal daily  pantoprazole    Tablet 40 milliGRAM(s) Oral before breakfast  polyethylene glycol 3350 17 Gram(s) Oral daily  predniSONE   Tablet 10 milliGRAM(s) Oral daily  senna 2 Tablet(s) Oral at bedtime  tamsulosin 0.4 milliGRAM(s) Oral at bedtime  traZODone 150 milliGRAM(s) Oral at bedtime    MEDICATIONS  (PRN):  acetaminophen     Tablet .. 650 milliGRAM(s) Oral every 6 hours PRN Temp greater or equal to 38C (100.4F), Mild Pain (1 - 3)  dextrose Oral Gel 15 Gram(s) Oral once PRN Blood Glucose LESS THAN 70 milliGRAM(s)/deciliter  haloperidol    Injectable 2.5 milliGRAM(s) IntraMuscular every 6 hours PRN Agitation  oxyCODONE    IR 5 milliGRAM(s) Oral every 8 hours PRN Severe Pain (7 - 10)

## 2024-01-20 NOTE — PROGRESS NOTE ADULT - PROBLEM SELECTOR PLAN 3
Will continue home alprazolam 0.5 mg TID and trazodone.  - psych eval pending Will continue home alprazolam 0.5 mg TID and trazodone 150 qhs.  - psych eval pending

## 2024-01-20 NOTE — BH CONSULTATION LIAISON ASSESSMENT NOTE - NSBHATTESTAPPAMEND_PSY_A_CORE
I have personally seen and examined this patient. I fully participated in the care of this patient. I have made amendments to the documentation where appropriate and otherwise agree with the history, physical exam, and plan as documented by the JONI

## 2024-01-20 NOTE — BH CONSULTATION LIAISON ASSESSMENT NOTE - SUMMARY
62 y/o M w/ with a PMH of CAD, CHF, CYNTHIA on nocturnal bipap, COPD (multiple intubations in past on 3L of home O2), peripheral neuropathy, GERD, HTN, HLD, obesity, polyarthritis, Pulmonary HTN, TIA, PPHx bipolar dos, insomnia, presents with increased agitation and hallucination at NH today.     Patient a&o, endorses mood as "ok," aware sent to hospital for aggression, denies being physically aggressive only verbally, endorses chronic auditory hallucinations-not command in nature, endorses paranoia and delusions, feels safe in the hospital and is being treated well, patient denies suicidal/homicidal ideation, denies visual hallucinations.     Discussed with primary team recommendations below:    PLAN:  -defer obs status to primary team-if any changes in behavior or presentation adjust accordingly  -c/w home medication regiment as follows:   Xanax 0.5mg tid   Gabapentin 100mg tid   Melatonin 6mg hs   Trazodone 150mg hs  -Agitation: Haldol 2.5mg q6h prn if ineffective after 30minutes may give additional 2.5mg for refractory agitation  -Monitor EKG qtc interval  -CANNOT leave AMA  -CL to follow    64 y/o M w/ with a PMH of CAD, CHF, CYNTHIA on nocturnal bipap, COPD (multiple intubations in past on 3L of home O2), peripheral neuropathy, GERD, HTN, HLD, obesity, polyarthritis, Pulmonary HTN, TIA, as per chart PPHx bipolar dos (?), insomnia, presents with increased agitation and hallucination at NH today.     Patient a&o, endorses mood as "ok," aware sent to hospital for aggression, denies being physically aggressive only verbally, endorses chronic auditory hallucinations-not command in nature, endorses paranoia and delusions, feels safe in the hospital and is being treated well, patient denies suicidal/homicidal ideation, denies visual hallucinations.     Discussed with primary team recommendations below:    PLAN:  -defer obs status to primary team-if any changes in behavior or presentation adjust accordingly  -c/w home medication regiment as follows:   Xanax 0.5mg tid   Gabapentin 100mg tid   Melatonin 6mg hs   Trazodone 150mg hs  -Agitation: Haldol 2.5mg q6h prn if ineffective after 30minutes may give additional 2.5mg for refractory agitation  -Monitor EKG qtc interval and hold antipsychotics if qtc>500  -CANNOT leave AMA  -CL to follow

## 2024-01-20 NOTE — BH CONSULTATION LIAISON ASSESSMENT NOTE - NSBHCHARTREVIEWLAB_PSY_A_CORE FT
CBC Full  -  ( 20 Jan 2024 07:17 )  WBC Count : 9.70 K/uL  RBC Count : 3.90 M/uL  Hemoglobin : 11.0 g/dL  Hematocrit : 34.5 %  Platelet Count - Automated : 192 K/uL  Mean Cell Volume : 88.5 fL  Mean Cell Hemoglobin : 28.2 pg  Mean Cell Hemoglobin Concentration : 31.9 gm/dL  Auto Neutrophil # : 6.21 K/uL  Auto Lymphocyte # : 2.36 K/uL  Auto Monocyte # : 0.86 K/uL  Auto Eosinophil # : 0.08 K/uL  Auto Basophil # : 0.04 K/uL  Auto Neutrophil % : 64.1 %  Auto Lymphocyte % : 24.3 %  Auto Monocyte % : 8.9 %  Auto Eosinophil % : 0.8 %  Auto Basophil % : 0.4 %  01-20    136  |  99  |  25<H>  ----------------------------<  172<H>  4.5   |  28  |  0.67    Ca    8.3<L>      20 Jan 2024 07:17  Phos  4.2     01-20  Mg     1.80     01-20    TPro  6.8  /  Alb  3.8  /  TBili  0.2  /  DBili  x   /  AST  32  /  ALT  35  /  AlkPhos  65  01-19

## 2024-01-20 NOTE — BH CONSULTATION LIAISON ASSESSMENT NOTE - NS ED BHA HOMICIDALITY PRESENT AGGRESSION PROPERTY PAST MONTH
The patient was told their pupil does not dilate well which carries an increased risk of surgical complications. Cyclogyl with surgery. Unable to assess

## 2024-01-20 NOTE — PATIENT PROFILE ADULT - FALL HARM RISK - HARM RISK INTERVENTIONS

## 2024-01-21 LAB
ANION GAP SERPL CALC-SCNC: 8 MMOL/L — SIGNIFICANT CHANGE UP (ref 7–14)
BASOPHILS # BLD AUTO: 0.06 K/UL — SIGNIFICANT CHANGE UP (ref 0–0.2)
BASOPHILS NFR BLD AUTO: 0.7 % — SIGNIFICANT CHANGE UP (ref 0–2)
BUN SERPL-MCNC: 25 MG/DL — HIGH (ref 7–23)
CALCIUM SERPL-MCNC: 8.4 MG/DL — SIGNIFICANT CHANGE UP (ref 8.4–10.5)
CHLORIDE SERPL-SCNC: 99 MMOL/L — SIGNIFICANT CHANGE UP (ref 98–107)
CO2 SERPL-SCNC: 31 MMOL/L — SIGNIFICANT CHANGE UP (ref 22–31)
CREAT SERPL-MCNC: 0.64 MG/DL — SIGNIFICANT CHANGE UP (ref 0.5–1.3)
EGFR: 108 ML/MIN/1.73M2 — SIGNIFICANT CHANGE UP
EOSINOPHIL # BLD AUTO: 0.14 K/UL — SIGNIFICANT CHANGE UP (ref 0–0.5)
EOSINOPHIL NFR BLD AUTO: 1.6 % — SIGNIFICANT CHANGE UP (ref 0–6)
GLUCOSE BLDC GLUCOMTR-MCNC: 156 MG/DL — HIGH (ref 70–99)
GLUCOSE BLDC GLUCOMTR-MCNC: 158 MG/DL — HIGH (ref 70–99)
GLUCOSE BLDC GLUCOMTR-MCNC: 174 MG/DL — HIGH (ref 70–99)
GLUCOSE BLDC GLUCOMTR-MCNC: 209 MG/DL — HIGH (ref 70–99)
GLUCOSE BLDC GLUCOMTR-MCNC: 227 MG/DL — HIGH (ref 70–99)
GLUCOSE BLDC GLUCOMTR-MCNC: 242 MG/DL — HIGH (ref 70–99)
GLUCOSE SERPL-MCNC: 164 MG/DL — HIGH (ref 70–99)
HCT VFR BLD CALC: 38.5 % — LOW (ref 39–50)
HGB BLD-MCNC: 11.9 G/DL — LOW (ref 13–17)
IANC: 5.32 K/UL — SIGNIFICANT CHANGE UP (ref 1.8–7.4)
IMM GRANULOCYTES NFR BLD AUTO: 1.9 % — HIGH (ref 0–0.9)
LYMPHOCYTES # BLD AUTO: 2.41 K/UL — SIGNIFICANT CHANGE UP (ref 1–3.3)
LYMPHOCYTES # BLD AUTO: 27.4 % — SIGNIFICANT CHANGE UP (ref 13–44)
MAGNESIUM SERPL-MCNC: 2 MG/DL — SIGNIFICANT CHANGE UP (ref 1.6–2.6)
MCHC RBC-ENTMCNC: 27.9 PG — SIGNIFICANT CHANGE UP (ref 27–34)
MCHC RBC-ENTMCNC: 30.9 GM/DL — LOW (ref 32–36)
MCV RBC AUTO: 90.2 FL — SIGNIFICANT CHANGE UP (ref 80–100)
MONOCYTES # BLD AUTO: 0.7 K/UL — SIGNIFICANT CHANGE UP (ref 0–0.9)
MONOCYTES NFR BLD AUTO: 8 % — SIGNIFICANT CHANGE UP (ref 2–14)
NEUTROPHILS # BLD AUTO: 5.32 K/UL — SIGNIFICANT CHANGE UP (ref 1.8–7.4)
NEUTROPHILS NFR BLD AUTO: 60.4 % — SIGNIFICANT CHANGE UP (ref 43–77)
NRBC # BLD: 0 /100 WBCS — SIGNIFICANT CHANGE UP (ref 0–0)
NRBC # FLD: 0 K/UL — SIGNIFICANT CHANGE UP (ref 0–0)
PHOSPHATE SERPL-MCNC: 4.2 MG/DL — SIGNIFICANT CHANGE UP (ref 2.5–4.5)
PLATELET # BLD AUTO: 200 K/UL — SIGNIFICANT CHANGE UP (ref 150–400)
POTASSIUM SERPL-MCNC: 4.1 MMOL/L — SIGNIFICANT CHANGE UP (ref 3.5–5.3)
POTASSIUM SERPL-SCNC: 4.1 MMOL/L — SIGNIFICANT CHANGE UP (ref 3.5–5.3)
RBC # BLD: 4.27 M/UL — SIGNIFICANT CHANGE UP (ref 4.2–5.8)
RBC # FLD: 13.9 % — SIGNIFICANT CHANGE UP (ref 10.3–14.5)
SODIUM SERPL-SCNC: 138 MMOL/L — SIGNIFICANT CHANGE UP (ref 135–145)
WBC # BLD: 8.8 K/UL — SIGNIFICANT CHANGE UP (ref 3.8–10.5)
WBC # FLD AUTO: 8.8 K/UL — SIGNIFICANT CHANGE UP (ref 3.8–10.5)

## 2024-01-21 PROCEDURE — 99232 SBSQ HOSP IP/OBS MODERATE 35: CPT

## 2024-01-21 RX ADMIN — TAMSULOSIN HYDROCHLORIDE 0.4 MILLIGRAM(S): 0.4 CAPSULE ORAL at 21:23

## 2024-01-21 RX ADMIN — Medication 3 MILLILITER(S): at 07:35

## 2024-01-21 RX ADMIN — Medication 1: at 06:03

## 2024-01-21 RX ADMIN — Medication 10 UNIT(S): at 16:49

## 2024-01-21 RX ADMIN — Medication 2: at 11:48

## 2024-01-21 RX ADMIN — Medication 0.5 MILLIGRAM(S): at 05:47

## 2024-01-21 RX ADMIN — Medication 150 MILLIGRAM(S): at 21:22

## 2024-01-21 RX ADMIN — Medication 10 UNIT(S): at 08:06

## 2024-01-21 RX ADMIN — LATANOPROST 1 DROP(S): 0.05 SOLUTION/ DROPS OPHTHALMIC; TOPICAL at 22:11

## 2024-01-21 RX ADMIN — Medication 2: at 16:49

## 2024-01-21 RX ADMIN — Medication 10 MILLIGRAM(S): at 05:47

## 2024-01-21 RX ADMIN — PANTOPRAZOLE SODIUM 40 MILLIGRAM(S): 20 TABLET, DELAYED RELEASE ORAL at 07:57

## 2024-01-21 RX ADMIN — Medication 200 MILLIGRAM(S): at 21:22

## 2024-01-21 RX ADMIN — Medication 0.5 MILLIGRAM(S): at 21:22

## 2024-01-21 RX ADMIN — INSULIN GLARGINE 22 UNIT(S): 100 INJECTION, SOLUTION SUBCUTANEOUS at 21:23

## 2024-01-21 RX ADMIN — ATORVASTATIN CALCIUM 40 MILLIGRAM(S): 80 TABLET, FILM COATED ORAL at 21:23

## 2024-01-21 RX ADMIN — Medication 0.5 MILLIGRAM(S): at 14:11

## 2024-01-21 RX ADMIN — Medication 81 MILLIGRAM(S): at 11:31

## 2024-01-21 RX ADMIN — CEFTRIAXONE 100 MILLIGRAM(S): 500 INJECTION, POWDER, FOR SOLUTION INTRAMUSCULAR; INTRAVENOUS at 21:22

## 2024-01-21 RX ADMIN — ENOXAPARIN SODIUM 40 MILLIGRAM(S): 100 INJECTION SUBCUTANEOUS at 11:31

## 2024-01-21 RX ADMIN — BUDESONIDE AND FORMOTEROL FUMARATE DIHYDRATE 2 PUFF(S): 160; 4.5 AEROSOL RESPIRATORY (INHALATION) at 21:36

## 2024-01-21 RX ADMIN — Medication 3 MILLILITER(S): at 15:24

## 2024-01-21 RX ADMIN — OXYCODONE HYDROCHLORIDE 5 MILLIGRAM(S): 5 TABLET ORAL at 22:10

## 2024-01-21 RX ADMIN — OXYCODONE HYDROCHLORIDE 5 MILLIGRAM(S): 5 TABLET ORAL at 15:27

## 2024-01-21 RX ADMIN — Medication 10 UNIT(S): at 11:49

## 2024-01-21 RX ADMIN — OXYCODONE HYDROCHLORIDE 5 MILLIGRAM(S): 5 TABLET ORAL at 06:01

## 2024-01-21 RX ADMIN — Medication 6 MILLIGRAM(S): at 21:22

## 2024-01-21 RX ADMIN — OXYCODONE HYDROCHLORIDE 5 MILLIGRAM(S): 5 TABLET ORAL at 07:32

## 2024-01-21 RX ADMIN — BUDESONIDE AND FORMOTEROL FUMARATE DIHYDRATE 2 PUFF(S): 160; 4.5 AEROSOL RESPIRATORY (INHALATION) at 08:57

## 2024-01-21 RX ADMIN — Medication 3 MILLILITER(S): at 19:27

## 2024-01-21 RX ADMIN — GABAPENTIN 100 MILLIGRAM(S): 400 CAPSULE ORAL at 05:46

## 2024-01-21 RX ADMIN — OXYCODONE HYDROCHLORIDE 5 MILLIGRAM(S): 5 TABLET ORAL at 14:10

## 2024-01-21 RX ADMIN — FINASTERIDE 5 MILLIGRAM(S): 5 TABLET, FILM COATED ORAL at 11:31

## 2024-01-21 NOTE — PROGRESS NOTE ADULT - SUBJECTIVE AND OBJECTIVE BOX
Patient is a 63y old  Male who presents with a chief complaint of hallucinations (20 Jan 2024 14:07)      INTERVAL HPI/OVERNIGHT EVENTS: GREGORIA overnight. This morning no acute complaints.       REVIEW OF SYSTEMS:    CONSTITUTIONAL: No weakness, fevers or chills  EYES/ENT: No visual changes;  No vertigo or throat pain   NECK: No pain or stiffness  RESPIRATORY: No cough, wheezing, hemoptysis; No shortness of breath  CARDIOVASCULAR: No chest pain or palpitations  GASTROINTESTINAL: No abdominal or epigastric pain. No nausea, vomiting, or hematemesis; No diarrhea or constipation. No melena or hematochezia.  GENITOURINARY: No dysuria, frequency or hematuria  NEUROLOGICAL: No numbness or weakness  All other review of systems is negative unless indicated above.    FAMILY HISTORY:    T(C): 36.2 (01-21-24 @ 05:35), Max: 36.9 (01-20-24 @ 20:00)  HR: 71 (01-21-24 @ 07:35) (69 - 80)  BP: 129/71 (01-21-24 @ 05:35) (112/67 - 129/71)  RR: 18 (01-21-24 @ 05:35) (16 - 18)  SpO2: 97% (01-21-24 @ 07:35) (97% - 100%)  Wt(kg): --Vital Signs Last 24 Hrs  T(C): 36.2 (21 Jan 2024 05:35), Max: 36.9 (20 Jan 2024 20:00)  T(F): 97.2 (21 Jan 2024 05:35), Max: 98.5 (20 Jan 2024 20:00)  HR: 71 (21 Jan 2024 07:35) (69 - 80)  BP: 129/71 (21 Jan 2024 05:35) (112/67 - 129/71)  BP(mean): --  RR: 18 (21 Jan 2024 05:35) (16 - 18)  SpO2: 97% (21 Jan 2024 07:35) (97% - 100%)    Parameters below as of 21 Jan 2024 07:35  Patient On (Oxygen Delivery Method): nasal cannula        PHYSICAL EXAM:  Constitutional: resting comfortably in bed; NAD  Head: NC/AT  Eyes: PERRL, EOMI, anicteric sclera  ENT: no nasal discharge; uvula midline, no oropharyngeal erythema or exudates; MMM  Neck: supple  Respiratory: CTA B/L; no W/R/R, no retractions  Cardiac: +S1/S2; RRR; no M/R/G  Gastrointestinal: abdomen soft, NT/ND; no rebound or guarding; +BSx4  Back: spine midline, no bony tenderness  Extremities: WWP, no clubbing or cyanosis; no peripheral edema  Musculoskeletal: NROM x4; no joint swelling, tenderness or erythema  Vascular: distal pulses intact  Dermatologic: skin warm, dry and intact; no rashes  Lymphatic: no submandibular or cervical LAD  Neurologic: AAOx3; moves all 4 extremities  Psychiatric: affect and characteristics of appearance, verbalizations, behaviors are appropriate    Consultant(s) Notes Reviewed:  [x ] YES  [ ] NO  Care Discussed with Consultants/Other Providers [ x] YES  [ ] NO    LABS:                        11.9   8.80  )-----------( 200      ( 21 Jan 2024 06:00 )             38.5     21 Jan 2024 06:00    138    |  99     |  25     ----------------------------<  164    4.1     |  31     |  0.64     Ca    8.4        21 Jan 2024 06:00  Phos  4.2       21 Jan 2024 06:00  Mg     2.00      21 Jan 2024 06:00        CAPILLARY BLOOD GLUCOSE      POCT Blood Glucose.: 174 mg/dL (21 Jan 2024 08:01)  POCT Blood Glucose.: 156 mg/dL (21 Jan 2024 05:45)  POCT Blood Glucose.: 158 mg/dL (21 Jan 2024 05:20)  POCT Blood Glucose.: 207 mg/dL (20 Jan 2024 22:18)  POCT Blood Glucose.: 148 mg/dL (20 Jan 2024 16:57)  POCT Blood Glucose.: 302 mg/dL (20 Jan 2024 13:06)    BLOOD CULTURE      RADIOLOGY & ADDITIONAL TESTS:    Imaging Personally Reviewed:  [ ] YES  [ ] NO  acetaminophen     Tablet .. 650 milliGRAM(s) Oral every 6 hours PRN  albuterol/ipratropium for Nebulization 3 milliLiter(s) Nebulizer every 6 hours  ALPRAZolam 0.5 milliGRAM(s) Oral three times a day  aspirin  chewable 81 milliGRAM(s) Oral daily  atorvastatin 40 milliGRAM(s) Oral at bedtime  budesonide  80 MICROgram(s)/formoterol 4.5 MICROgram(s) Inhaler 2 Puff(s) Inhalation two times a day  cefTRIAXone   IVPB 1000 milliGRAM(s) IV Intermittent every 24 hours  dextrose 5%. 1000 milliLiter(s) IV Continuous <Continuous>  dextrose 50% Injectable 25 Gram(s) IV Push once  dextrose Oral Gel 15 Gram(s) Oral once PRN  enoxaparin Injectable 40 milliGRAM(s) SubCutaneous every 24 hours  finasteride 5 milliGRAM(s) Oral daily  gabapentin 100 milliGRAM(s) Oral three times a day  glucagon  Injectable 1 milliGRAM(s) IntraMuscular once  haloperidol    Injectable 2.5 milliGRAM(s) IntraMuscular every 6 hours PRN  insulin glargine Injectable (LANTUS) 22 Unit(s) SubCutaneous at bedtime  insulin lispro (ADMELOG) corrective regimen sliding scale   SubCutaneous at bedtime  insulin lispro (ADMELOG) corrective regimen sliding scale   SubCutaneous three times a day before meals  insulin lispro Injectable (ADMELOG) 10 Unit(s) SubCutaneous three times a day before meals  latanoprost 0.005% Ophthalmic Solution 1 Drop(s) Both EYES at bedtime  melatonin 6 milliGRAM(s) Oral at bedtime  nicotine -   7 mG/24Hr(s) Patch 1 Patch Transdermal daily  oxyCODONE    IR 5 milliGRAM(s) Oral every 8 hours PRN  pantoprazole    Tablet 40 milliGRAM(s) Oral before breakfast  polyethylene glycol 3350 17 Gram(s) Oral daily  predniSONE   Tablet 10 milliGRAM(s) Oral daily  senna 2 Tablet(s) Oral at bedtime  tamsulosin 0.4 milliGRAM(s) Oral at bedtime  traZODone 150 milliGRAM(s) Oral at bedtime      HEALTH ISSUES - PROBLEM Dx:  Hallucinations    UTI (urinary tract infection)    Bipolar disorder with depression    Chronic obstructive pulmonary disease (COPD)    Back pain    HTN (hypertension)    DM (diabetes mellitus)    CYNTHIA treated with BiPAP    CAD (coronary artery disease)    Prophylactic measure

## 2024-01-21 NOTE — PROGRESS NOTE ADULT - PROBLEM SELECTOR PLAN 9
Pt on novolin 70/30 38 units QAM and 34 units QPM  - Lantus 22 units QHS and admelog 10 units TID w/ meals w/ low dose ISS

## 2024-01-21 NOTE — PROGRESS NOTE ADULT - PROBLEM SELECTOR PLAN 1
Pt presenting with worsening agitation, pt hearing voices and with paranoia. He states he thinks someone by the name at [facility] at [facility] has been able to hack into his phone conversations. He denies any SI.   - psych eval appreciated  - Xanax 0.5mg tid  - Gabapentin 100mg tid  - Melatonin 6mg hs  - Trazodone 150mg hs

## 2024-01-21 NOTE — PROGRESS NOTE ADULT - PROBLEM SELECTOR PLAN 3
U/A positive, pt endorsing some mild dysuria. May possibly be exacerbating hallucinations. s/p IV ceftriaxone in ED.  - c/w IV ceftriaxone  - Urine culture not collected before antibiotics administered. Will attempt to collect sample today, though results may be affected

## 2024-01-21 NOTE — PROGRESS NOTE ADULT - PROBLEM SELECTOR PLAN 6
- At home, on Percocet 5-325 mg tid  - c/w oxycodone 5 mg tid PRN severe pain  - c/w gabapentin 100 tid

## 2024-01-22 ENCOUNTER — TRANSCRIPTION ENCOUNTER (OUTPATIENT)
Age: 64
End: 2024-01-22

## 2024-01-22 VITALS — HEART RATE: 78 BPM | OXYGEN SATURATION: 98 %

## 2024-01-22 LAB
ANION GAP SERPL CALC-SCNC: 8 MMOL/L — SIGNIFICANT CHANGE UP (ref 7–14)
B PERT DNA SPEC QL NAA+PROBE: SIGNIFICANT CHANGE UP
B PERT+PARAPERT DNA PNL SPEC NAA+PROBE: SIGNIFICANT CHANGE UP
BASOPHILS # BLD AUTO: 0.05 K/UL — SIGNIFICANT CHANGE UP (ref 0–0.2)
BASOPHILS NFR BLD AUTO: 0.6 % — SIGNIFICANT CHANGE UP (ref 0–2)
BORDETELLA PARAPERTUSSIS (RAPRVP): SIGNIFICANT CHANGE UP
BUN SERPL-MCNC: 26 MG/DL — HIGH (ref 7–23)
C PNEUM DNA SPEC QL NAA+PROBE: SIGNIFICANT CHANGE UP
CALCIUM SERPL-MCNC: 8.4 MG/DL — SIGNIFICANT CHANGE UP (ref 8.4–10.5)
CHLORIDE SERPL-SCNC: 99 MMOL/L — SIGNIFICANT CHANGE UP (ref 98–107)
CO2 SERPL-SCNC: 31 MMOL/L — SIGNIFICANT CHANGE UP (ref 22–31)
CREAT SERPL-MCNC: 0.66 MG/DL — SIGNIFICANT CHANGE UP (ref 0.5–1.3)
EGFR: 107 ML/MIN/1.73M2 — SIGNIFICANT CHANGE UP
EOSINOPHIL # BLD AUTO: 0.13 K/UL — SIGNIFICANT CHANGE UP (ref 0–0.5)
EOSINOPHIL NFR BLD AUTO: 1.5 % — SIGNIFICANT CHANGE UP (ref 0–6)
FLUAV SUBTYP SPEC NAA+PROBE: SIGNIFICANT CHANGE UP
FLUBV RNA SPEC QL NAA+PROBE: SIGNIFICANT CHANGE UP
GLUCOSE BLDC GLUCOMTR-MCNC: 160 MG/DL — HIGH (ref 70–99)
GLUCOSE BLDC GLUCOMTR-MCNC: 168 MG/DL — HIGH (ref 70–99)
GLUCOSE BLDC GLUCOMTR-MCNC: 245 MG/DL — HIGH (ref 70–99)
GLUCOSE SERPL-MCNC: 163 MG/DL — HIGH (ref 70–99)
HADV DNA SPEC QL NAA+PROBE: SIGNIFICANT CHANGE UP
HCOV 229E RNA SPEC QL NAA+PROBE: SIGNIFICANT CHANGE UP
HCOV HKU1 RNA SPEC QL NAA+PROBE: SIGNIFICANT CHANGE UP
HCOV NL63 RNA SPEC QL NAA+PROBE: SIGNIFICANT CHANGE UP
HCOV OC43 RNA SPEC QL NAA+PROBE: SIGNIFICANT CHANGE UP
HCT VFR BLD CALC: 36.7 % — LOW (ref 39–50)
HGB BLD-MCNC: 11.5 G/DL — LOW (ref 13–17)
HMPV RNA SPEC QL NAA+PROBE: SIGNIFICANT CHANGE UP
HPIV1 RNA SPEC QL NAA+PROBE: SIGNIFICANT CHANGE UP
HPIV2 RNA SPEC QL NAA+PROBE: SIGNIFICANT CHANGE UP
HPIV3 RNA SPEC QL NAA+PROBE: SIGNIFICANT CHANGE UP
HPIV4 RNA SPEC QL NAA+PROBE: SIGNIFICANT CHANGE UP
IANC: 5.46 K/UL — SIGNIFICANT CHANGE UP (ref 1.8–7.4)
IMM GRANULOCYTES NFR BLD AUTO: 2.5 % — HIGH (ref 0–0.9)
LYMPHOCYTES # BLD AUTO: 2.32 K/UL — SIGNIFICANT CHANGE UP (ref 1–3.3)
LYMPHOCYTES # BLD AUTO: 26.1 % — SIGNIFICANT CHANGE UP (ref 13–44)
M PNEUMO DNA SPEC QL NAA+PROBE: SIGNIFICANT CHANGE UP
MAGNESIUM SERPL-MCNC: 2 MG/DL — SIGNIFICANT CHANGE UP (ref 1.6–2.6)
MCHC RBC-ENTMCNC: 27.7 PG — SIGNIFICANT CHANGE UP (ref 27–34)
MCHC RBC-ENTMCNC: 31.3 GM/DL — LOW (ref 32–36)
MCV RBC AUTO: 88.4 FL — SIGNIFICANT CHANGE UP (ref 80–100)
MONOCYTES # BLD AUTO: 0.7 K/UL — SIGNIFICANT CHANGE UP (ref 0–0.9)
MONOCYTES NFR BLD AUTO: 7.9 % — SIGNIFICANT CHANGE UP (ref 2–14)
NEUTROPHILS # BLD AUTO: 5.46 K/UL — SIGNIFICANT CHANGE UP (ref 1.8–7.4)
NEUTROPHILS NFR BLD AUTO: 61.4 % — SIGNIFICANT CHANGE UP (ref 43–77)
NRBC # BLD: 0 /100 WBCS — SIGNIFICANT CHANGE UP (ref 0–0)
NRBC # FLD: 0 K/UL — SIGNIFICANT CHANGE UP (ref 0–0)
PHOSPHATE SERPL-MCNC: 4.1 MG/DL — SIGNIFICANT CHANGE UP (ref 2.5–4.5)
PLATELET # BLD AUTO: 207 K/UL — SIGNIFICANT CHANGE UP (ref 150–400)
POTASSIUM SERPL-MCNC: 4.3 MMOL/L — SIGNIFICANT CHANGE UP (ref 3.5–5.3)
POTASSIUM SERPL-SCNC: 4.3 MMOL/L — SIGNIFICANT CHANGE UP (ref 3.5–5.3)
RAPID RVP RESULT: SIGNIFICANT CHANGE UP
RBC # BLD: 4.15 M/UL — LOW (ref 4.2–5.8)
RBC # FLD: 13.8 % — SIGNIFICANT CHANGE UP (ref 10.3–14.5)
RSV RNA SPEC QL NAA+PROBE: SIGNIFICANT CHANGE UP
RV+EV RNA SPEC QL NAA+PROBE: SIGNIFICANT CHANGE UP
SARS-COV-2 RNA SPEC QL NAA+PROBE: SIGNIFICANT CHANGE UP
SODIUM SERPL-SCNC: 138 MMOL/L — SIGNIFICANT CHANGE UP (ref 135–145)
WBC # BLD: 8.88 K/UL — SIGNIFICANT CHANGE UP (ref 3.8–10.5)
WBC # FLD AUTO: 8.88 K/UL — SIGNIFICANT CHANGE UP (ref 3.8–10.5)

## 2024-01-22 PROCEDURE — 99232 SBSQ HOSP IP/OBS MODERATE 35: CPT

## 2024-01-22 PROCEDURE — 99239 HOSP IP/OBS DSCHRG MGMT >30: CPT

## 2024-01-22 RX ADMIN — Medication 81 MILLIGRAM(S): at 11:18

## 2024-01-22 RX ADMIN — Medication 2: at 11:16

## 2024-01-22 RX ADMIN — Medication 0.5 MILLIGRAM(S): at 06:10

## 2024-01-22 RX ADMIN — Medication 10 UNIT(S): at 16:55

## 2024-01-22 RX ADMIN — Medication 3 MILLILITER(S): at 10:13

## 2024-01-22 RX ADMIN — OXYCODONE HYDROCHLORIDE 5 MILLIGRAM(S): 5 TABLET ORAL at 07:30

## 2024-01-22 RX ADMIN — Medication 3 MILLILITER(S): at 16:44

## 2024-01-22 RX ADMIN — Medication 10 UNIT(S): at 07:38

## 2024-01-22 RX ADMIN — POLYETHYLENE GLYCOL 3350 17 GRAM(S): 17 POWDER, FOR SOLUTION ORAL at 11:17

## 2024-01-22 RX ADMIN — BUDESONIDE AND FORMOTEROL FUMARATE DIHYDRATE 2 PUFF(S): 160; 4.5 AEROSOL RESPIRATORY (INHALATION) at 07:44

## 2024-01-22 RX ADMIN — FINASTERIDE 5 MILLIGRAM(S): 5 TABLET, FILM COATED ORAL at 11:18

## 2024-01-22 RX ADMIN — Medication 0.5 MILLIGRAM(S): at 13:05

## 2024-01-22 RX ADMIN — OXYCODONE HYDROCHLORIDE 5 MILLIGRAM(S): 5 TABLET ORAL at 14:05

## 2024-01-22 RX ADMIN — Medication 1: at 16:55

## 2024-01-22 RX ADMIN — Medication 10 UNIT(S): at 11:17

## 2024-01-22 RX ADMIN — PANTOPRAZOLE SODIUM 40 MILLIGRAM(S): 20 TABLET, DELAYED RELEASE ORAL at 06:11

## 2024-01-22 RX ADMIN — Medication 10 MILLIGRAM(S): at 06:12

## 2024-01-22 RX ADMIN — Medication 3 MILLILITER(S): at 02:12

## 2024-01-22 RX ADMIN — OXYCODONE HYDROCHLORIDE 5 MILLIGRAM(S): 5 TABLET ORAL at 15:05

## 2024-01-22 RX ADMIN — Medication 1: at 07:37

## 2024-01-22 RX ADMIN — OXYCODONE HYDROCHLORIDE 5 MILLIGRAM(S): 5 TABLET ORAL at 06:10

## 2024-01-22 NOTE — PROGRESS NOTE ADULT - PROBLEM SELECTOR PLAN 1
Pt presenting with worsening agitation, pt hearing voices and with paranoia. He states he thinks someone by the name at [facility] has been able to hack into his phone conversations. He denies any SI.   - psych eval appreciated  - Xanax 0.5mg tid  - Gabapentin 100mg tid  - Melatonin 6mg hs  - Trazodone 150mg hs Pt presenting with worsening agitation, pt hearing voices and with paranoia. He states he thinks someone by the name at [facility] has been able to hack into his phone conversations. He denies any SI.   - psych eval appreciated - likely delirium in setting UTI, calm, no PRNs since here, no psych contraindication to d/c  - stable on home meds: Xanax 0.5mg tid, Gabapentin 100mg tid, Melatonin 6mg hs, Trazodone 150mg hs

## 2024-01-22 NOTE — PROGRESS NOTE ADULT - SUBJECTIVE AND OBJECTIVE BOX
[facility] Division of Hospital Medicine  Consuelo Fajardo MD  Pager (M-F, 8A-5P):  In-house pager [phone]; Long-range pager [phone]  Other Times:  Please page Hospitalist in Charge -  In-house pager [phone]    Patient is a 63y old  Male who presents with a chief complaint of hallucinations (21 Jan 2024 09:54)    SUBJECTIVE / OVERNIGHT EVENTS:  No problems reported over night.    ADDITIONAL REVIEW OF SYSTEMS:    MEDICATIONS  (STANDING):  albuterol/ipratropium for Nebulization 3 milliLiter(s) Nebulizer every 6 hours  ALPRAZolam 0.5 milliGRAM(s) Oral three times a day  aspirin  chewable 81 milliGRAM(s) Oral daily  atorvastatin 40 milliGRAM(s) Oral at bedtime  budesonide  80 MICROgram(s)/formoterol 4.5 MICROgram(s) Inhaler 2 Puff(s) Inhalation two times a day  dextrose 5%. 1000 milliLiter(s) (50 mL/Hr) IV Continuous <Continuous>  dextrose 50% Injectable 25 Gram(s) IV Push once  enoxaparin Injectable 40 milliGRAM(s) SubCutaneous every 24 hours  finasteride 5 milliGRAM(s) Oral daily  gabapentin 100 milliGRAM(s) Oral three times a day  glucagon  Injectable 1 milliGRAM(s) IntraMuscular once  insulin glargine Injectable (LANTUS) 22 Unit(s) SubCutaneous at bedtime  insulin lispro (ADMELOG) corrective regimen sliding scale   SubCutaneous at bedtime  insulin lispro (ADMELOG) corrective regimen sliding scale   SubCutaneous three times a day before meals  insulin lispro Injectable (ADMELOG) 10 Unit(s) SubCutaneous three times a day before meals  latanoprost 0.005% Ophthalmic Solution 1 Drop(s) Both EYES at bedtime  melatonin 6 milliGRAM(s) Oral at bedtime  nicotine -   7 mG/24Hr(s) Patch 1 Patch Transdermal daily  pantoprazole    Tablet 40 milliGRAM(s) Oral before breakfast  polyethylene glycol 3350 17 Gram(s) Oral daily  predniSONE   Tablet 10 milliGRAM(s) Oral daily  senna 2 Tablet(s) Oral at bedtime  tamsulosin 0.4 milliGRAM(s) Oral at bedtime  traZODone 150 milliGRAM(s) Oral at bedtime    MEDICATIONS  (PRN):  acetaminophen     Tablet .. 650 milliGRAM(s) Oral every 6 hours PRN Temp greater or equal to 38C (100.4F), Mild Pain (1 - 3)  dextrose Oral Gel 15 Gram(s) Oral once PRN Blood Glucose LESS THAN 70 milliGRAM(s)/deciliter  guaiFENesin Oral Liquid (Sugar-Free) 200 milliGRAM(s) Oral every 6 hours PRN Cough  haloperidol    Injectable 2.5 milliGRAM(s) IntraMuscular every 6 hours PRN Agitation  oxyCODONE    IR 5 milliGRAM(s) Oral every 8 hours PRN Severe Pain (7 - 10)    CAPILLARY BLOOD GLUCOSE  POCT Blood Glucose.: 245 mg/dL (22 Jan 2024 11:09)  POCT Blood Glucose.: 160 mg/dL (22 Jan 2024 07:26)  POCT Blood Glucose.: 242 mg/dL (21 Jan 2024 21:21)  POCT Blood Glucose.: 209 mg/dL (21 Jan 2024 16:37)    PHYSICAL EXAM:  Vital Signs Last 24 Hrs  T(C): 36.8 (22 Jan 2024 04:46), Max: 36.8 (22 Jan 2024 04:46)  T(F): 98.2 (22 Jan 2024 04:46), Max: 98.2 (22 Jan 2024 04:46)  HR: 75 (22 Jan 2024 10:13) (75 - 98)  BP: 100/60 (22 Jan 2024 04:46) (100/60 - 108/63)  BP(mean): --  RR: 18 (22 Jan 2024 04:46) (18 - 18)  SpO2: 95% (22 Jan 2024 10:13) (95% - 100%)    Parameters below as of 22 Jan 2024 10:13  Patient On (Oxygen Delivery Method): nasal cannula    Constitutional: resting comfortably in bed; NAD  Respiratory: CTA B/L; no W/R/R, no retractions  Cardiac: +S1/S2; RRR; no M/R/G  Gastrointestinal: abdomen soft, NT/ND; no rebound or guarding; +BSx4  Back: spine midline, no bony tenderness  Extremities: WWP, no clubbing or cyanosis; no peripheral edema  Musculoskeletal: NROM x4; no joint swelling, tenderness or erythema  Vascular: distal pulses intact  Dermatologic: skin warm, dry and intact; no rashes  Lymphatic: no submandibular or cervical LAD  Neurologic: AAOx3; moves all 4 extremities  Psychiatric: affect and characteristics of appearance, verbalizations, behaviors are appropriate    LABS:                        11.5   8.88  )-----------( 207      ( 22 Jan 2024 05:30 )             36.7     01-22    138  |  99  |  26<H>  ----------------------------<  163<H>  4.3   |  31  |  0.66    Ca    8.4      22 Jan 2024 05:30  Phos  4.1     01-22  Mg     2.00     01-22    RADIOLOGY & ADDITIONAL TESTS:  Results Reviewed:   Imaging Personally Reviewed:  Electrocardiogram Personally Reviewed:    COORDINATION OF CARE:  Care Discussed with Consultants/Other Providers [Y/N]: RN, SW, CM, ACP, psych re overall care   Prior or Outpatient Records Reviewed [Y/N]:   [facility] Division of Hospital Medicine  Consuelo Fajardo MD  Pager (M-F, 8A-5P):  In-house pager [phone]; Long-range pager [phone]  Other Times:  Please page Hospitalist in Charge -  In-house pager [phone]    Patient is a 63y old  Male who presents with a chief complaint of hallucinations (21 Jan 2024 09:54)    SUBJECTIVE / OVERNIGHT EVENTS:  No problems reported over night.  Pt says he's doing fine, wants to go home. Lives at [facility], sees Dr. Ramos. Retied . Never , no children. Sister Chiquita closest relative. Usually goes to . Smoking since teenager, quit 2 months ago, declined nicotine patch. Feels breathing at baseline, chronic congested cough. Just uses oxygen. Says claustrophobic so can't tolerate bipap.  Said he's here because he was calling the  because people stole his stuff ($1500 hearing aide, clothes, etc) when he was away.  Denies hallucinations.   ADDITIONAL REVIEW OF SYSTEMS:    MEDICATIONS  (STANDING):  albuterol/ipratropium for Nebulization 3 milliLiter(s) Nebulizer every 6 hours  ALPRAZolam 0.5 milliGRAM(s) Oral three times a day  aspirin  chewable 81 milliGRAM(s) Oral daily  atorvastatin 40 milliGRAM(s) Oral at bedtime  budesonide  80 MICROgram(s)/formoterol 4.5 MICROgram(s) Inhaler 2 Puff(s) Inhalation two times a day  dextrose 5%. 1000 milliLiter(s) (50 mL/Hr) IV Continuous <Continuous>  dextrose 50% Injectable 25 Gram(s) IV Push once  enoxaparin Injectable 40 milliGRAM(s) SubCutaneous every 24 hours  finasteride 5 milliGRAM(s) Oral daily  gabapentin 100 milliGRAM(s) Oral three times a day  glucagon  Injectable 1 milliGRAM(s) IntraMuscular once  insulin glargine Injectable (LANTUS) 22 Unit(s) SubCutaneous at bedtime  insulin lispro (ADMELOG) corrective regimen sliding scale   SubCutaneous at bedtime  insulin lispro (ADMELOG) corrective regimen sliding scale   SubCutaneous three times a day before meals  insulin lispro Injectable (ADMELOG) 10 Unit(s) SubCutaneous three times a day before meals  latanoprost 0.005% Ophthalmic Solution 1 Drop(s) Both EYES at bedtime  melatonin 6 milliGRAM(s) Oral at bedtime  nicotine -   7 mG/24Hr(s) Patch 1 Patch Transdermal daily  pantoprazole    Tablet 40 milliGRAM(s) Oral before breakfast  polyethylene glycol 3350 17 Gram(s) Oral daily  predniSONE   Tablet 10 milliGRAM(s) Oral daily  senna 2 Tablet(s) Oral at bedtime  tamsulosin 0.4 milliGRAM(s) Oral at bedtime  traZODone 150 milliGRAM(s) Oral at bedtime    MEDICATIONS  (PRN):  acetaminophen     Tablet .. 650 milliGRAM(s) Oral every 6 hours PRN Temp greater or equal to 38C (100.4F), Mild Pain (1 - 3)  dextrose Oral Gel 15 Gram(s) Oral once PRN Blood Glucose LESS THAN 70 milliGRAM(s)/deciliter  guaiFENesin Oral Liquid (Sugar-Free) 200 milliGRAM(s) Oral every 6 hours PRN Cough  haloperidol    Injectable 2.5 milliGRAM(s) IntraMuscular every 6 hours PRN Agitation  oxyCODONE    IR 5 milliGRAM(s) Oral every 8 hours PRN Severe Pain (7 - 10)    CAPILLARY BLOOD GLUCOSE  POCT Blood Glucose.: 245 mg/dL (22 Jan 2024 11:09)  POCT Blood Glucose.: 160 mg/dL (22 Jan 2024 07:26)  POCT Blood Glucose.: 242 mg/dL (21 Jan 2024 21:21)  POCT Blood Glucose.: 209 mg/dL (21 Jan 2024 16:37)    PHYSICAL EXAM:  Vital Signs Last 24 Hrs  T(C): 36.8 (22 Jan 2024 04:46), Max: 36.8 (22 Jan 2024 04:46)  T(F): 98.2 (22 Jan 2024 04:46), Max: 98.2 (22 Jan 2024 04:46)  HR: 75 (22 Jan 2024 10:13) (75 - 98)  BP: 100/60 (22 Jan 2024 04:46) (100/60 - 108/63)  BP(mean): --  RR: 18 (22 Jan 2024 04:46) (18 - 18)  SpO2: 95% (22 Jan 2024 10:13) (95% - 100%)    Parameters below as of 22 Jan 2024 10:13  Patient On (Oxygen Delivery Method): nasal cannula    Constitutional: lying flat, resting comfortably in bed, NAD with NC O2--> walked independently  Respiratory: scattered rhonchi  Cardiac: +S1/S2; RRR; no M/R/G  Gastrointestinal: abdomen soft, NT/ND; no rebound or guarding; +BSx4  Extremities: no peripheral edema  Neurologic: nonfocal  Psychiatric: alert, oriented, calm, cooperative    LABS:                        11.5   8.88  )-----------( 207      ( 22 Jan 2024 05:30 )             36.7     01-22    138  |  99  |  26<H>  ----------------------------<  163<H>  4.3   |  31  |  0.66    Ca    8.4      22 Jan 2024 05:30  Phos  4.1     01-22  Mg     2.00     01-22    RADIOLOGY & ADDITIONAL TESTS:  Results Reviewed:   Imaging Personally Reviewed:  Electrocardiogram Personally Reviewed:    COORDINATION OF CARE:  Care Discussed with Consultants/Other Providers [Y/N]: RN, SW, CM, ACP, psych re overall care   Prior or Outpatient Records Reviewed [Y/N]:

## 2024-01-22 NOTE — PROGRESS NOTE ADULT - PROBLEM SELECTOR PLAN 10
DVT prophylaxis: Lovenox  Diet: dash/tlc, cc  Dispo: Pending psych clearance DVT prophylaxis: Lovenox  Diet: dash/tlc, cc  [facility]

## 2024-01-22 NOTE — PROGRESS NOTE ADULT - ASSESSMENT
63M CAD, CHF, CYNTHIA on nocturnal bipap, COPD (multiple intubations in past on 3L of home O2), peripheral neuropathy, GERD, HTN, HLD, obesity, polyarthritis, Pulmonary HTN, TIA presents with increased agitation and hallucination at NH today. Pt admitted for further evaluation and management.  63M from [facility], CAD, CHF, CYNTHIA (bipap), COPD (multiple intubations in past on 3L of home O2), peripheral neuropathy, GERD, HTN, HLD, obesity, polyarthritis, pulmonary HTN, TIA presents with increased agitation and hallucination at NH today. Treated for UTI, likely has component of delirium as improved since here.

## 2024-01-22 NOTE — PROGRESS NOTE ADULT - NSPROGADDITIONALINFOA_GEN_ALL_CORE
OTHER MR# [id number] - recurrent [facility] admissions    Spent 45 minutes counseling and coordinating discharge care.

## 2024-01-22 NOTE — PHYSICAL THERAPY INITIAL EVALUATION ADULT - ADDITIONAL COMMENTS
Pt is long term resident of [facility], does not use an assistive device to ambulate or transfer. No stairs to negotiate.     Pt left semi-supine in bed, all lines intact, all needs in reach, bed alarm set, in NAD. [place] aware. Heart rate 88 beats per minute.

## 2024-01-22 NOTE — DISCHARGE NOTE PROVIDER - NSDCCPCAREPLAN_GEN_ALL_CORE_FT
PRINCIPAL DISCHARGE DIAGNOSIS  Diagnosis: Paranoia  Assessment and Plan of Treatment: You were continued on your home medications and seen by Psychiatry. You are cleared by Psychiatry to return to your prior nursing facility.      SECONDARY DISCHARGE DIAGNOSES  Diagnosis: UTI (urinary tract infection)  Assessment and Plan of Treatment: You were treated with a 3 day course of antibiotic therapy and completed treatment in the hospital.    Diagnosis: Chronic obstructive pulmonary disease (COPD)  Assessment and Plan of Treatment: Continue 2 more days of Prednisone 10mg once daily on 1/23 and 1/24 to complete full taper course. Continue inhaler/nebulizer therapy regimen. Follow up with your pulmonologist per routine.    Diagnosis: DM (diabetes mellitus)  Assessment and Plan of Treatment: Monitor finger sticks pre-meal and bedtime, low salt, fat and carbohydrate diet, minimize glucose intake.  Follow up with primary care physician and/or endocrinologist for routine Hemoglobin A1C checks and management.  Follow up with your ophthalmologist for routine yearly vision exams.       PRINCIPAL DISCHARGE DIAGNOSIS  Diagnosis: Metabolic encephalopathy  Assessment and Plan of Treatment: You rut had "delirium" in the setting of a UTI. You were continued on your home medications and seen by Psychiatry. You are cleared by Psychiatry to return to your prior nursing facility.      SECONDARY DISCHARGE DIAGNOSES  Diagnosis: UTI (urinary tract infection)  Assessment and Plan of Treatment: You were treated with a 3 day course of antibiotic therapy and completed treatment in the hospital.    Diagnosis: Chronic obstructive pulmonary disease (COPD)  Assessment and Plan of Treatment: Continue 2 more days of Prednisone 10mg once daily on 1/23 and 1/24 to complete full taper course. Continue inhaler/nebulizer therapy regimen. Follow up with your pulmonologist per routine.    Diagnosis: DM (diabetes mellitus)  Assessment and Plan of Treatment: Monitor finger sticks pre-meal and bedtime, low salt, fat and carbohydrate diet, minimize glucose intake.  Follow up with primary care physician and/or endocrinologist for routine Hemoglobin A1C checks and management.  Follow up with your ophthalmologist for routine yearly vision exams.

## 2024-01-22 NOTE — DISCHARGE NOTE NURSING/CASE MANAGEMENT/SOCIAL WORK - PATIENT PORTAL LINK FT
You can access the FollowMyHealth Patient Portal offered by [facility] by registering at the following website: [email or web address]. By joining [facility]’s FollowMyHealth portal, you will also be able to view your health information using other applications (apps) compatible with our system.

## 2024-01-22 NOTE — PROGRESS NOTE ADULT - PROBLEM SELECTOR PLAN 2
U/A positive, pt endorsing some mild dysuria. May possibly be exacerbating hallucinations. s/p IV ceftriaxone in ED.  - c/w IV ceftriaxone  - Urine culture not collected before antibiotics administered. Will attempt to collect sample today, though results may be affected
- Meds as above
- Meds as above

## 2024-01-22 NOTE — PHYSICAL THERAPY INITIAL EVALUATION ADULT - THERAPY FREQUENCY, PT EVAL
Melvin Faustin Lima [address] [facility] [address]. [address] [facility] 83 [id number] 
[phone] Patient: Ariel Carballo MRN: [id number] :1960 Visit Information Date & Time Provider Department Dept. Phone Encounter #  
 2018  8:15 AM Montse Mendosa, [address] [place] [address] [phone] [id number] Follow-up Instructions Return in about 4 months (around 10/28/2018) for 30 minutes. Upcoming Health Maintenance Date Due Influenza Age 5 to Adult 2018 COLONOSCOPY 2019 DTaP/Tdap/Td series (2 - Td) 10/4/2027 Allergies as of 2018  Review Complete On: 2018 By: Montse Mendosa MD  
  
 Severity Noted Reaction Type Reactions Strattera [Atomoxetine]  2013    Other (comments) Prostatitus Sulfa (Sulfonamide Antibiotics)  2013    Hives, Itching Venom-wasp  2014    Swelling Current Immunizations  Reviewed on 10/4/2017 Name Date Influenza Vaccine 10/31/2014 Influenza Vaccine (Quad) PF 2015 Pneumococcal Polysaccharide (PPSV-23) 10/4/2017 Tdap 10/4/2017 Not reviewed this visit You Were Diagnosed With   
  
 Codes Comments Essential hypertension    -  Primary ICD-10-CM: I10 
ICD-9-CM: 401.9 Mixed hyperlipidemia     ICD-10-CM: E78.2 ICD-9-CM: 272.2 Prediabetes     ICD-10-CM: R73.03 
ICD-9-CM: 790.29 Severe obesity (BMI 35.0-39. 9) with comorbidity ([facility] [address]     ICD-10-CM: E66.01 
ICD-9-CM: 278.01 Vitals BP Pulse Temp Resp Height(growth percentile) Weight(growth percentile) 135/76 66 97.6 °F (36.4 °C) (Oral) 16 5' 10\" (1.778 m) 254 lb (115.2 kg) BMI Smoking Status 36.45 kg/m2 Former Smoker BMI and BSA Data Body Mass Index Body Surface Area  
 36.45 kg/m 2 2.39 m 2 Preferred Pharmacy Pharmacy Name Phone [facility], [address] [phone] Your Updated Medication List  
  
   
This list is accurate as of 6/28/18  8:37 AM.  Always use your most recent med list.  
  
  
  
  
 albuterol 90 mcg/actuation inhaler Commonly known as:  PROVENTIL HFA, VENTOLIN HFA, PROAIR HFA Take 1 Puff by inhalation every six (6) hours as needed for Wheezing. * amLODIPine 5 mg tablet Commonly known as:  NORVASC  
take 1 tablet by mouth once daily * amLODIPine 10 mg tablet Commonly known as:  NORVASC  
take 1 tablet by mouth once daily Glucosamine &Chondroit-MV-Min3 635-650-61-0.5 mg Tab Take 1 Tab by mouth daily. lisinopril-hydroCHLOROthiazide 20-12.5 mg per tablet Commonly known as:  PRINZIDE, ZESTORETIC  
TAKE 2 TABLETS BY MOUTH DAILY  
  
 metoprolol succinate 100 mg tablet Commonly known as:  TOPROL-XL  
take 1 tablet by mouth once daily  
  
 multivitamin tablet Commonly known as:  ONE A DAY Take 1 Tab by mouth daily. OMEGA 3-6-9 40-60-10 mg-mg-unit Cap Generic drug:  OM 3-E-Linol-ALA-Oleic-GLA-Lip Take  by mouth daily. omeprazole 20 mg capsule Commonly known as:  PRILOSEC  
take 1 capsule by mouth once daily  
  
 plant stanol jerrell 450 mg Tab Commonly known as:  CHOLEST OFF Take 1 Tab by mouth daily. PROBIOTIC 4X 10-15 mg Tbec Generic drug:  B.infantis-B.ani-B.long-B.bifi Take  by mouth daily. rosuvastatin 40 mg tablet Commonly known as:  CRESTOR  
take 1 tablet by mouth at bedtime * varenicline 1 mg tablet Commonly known as:  Kelin Boop TAKE 1 TABLET BY MOUTH TWICE A DAY * varenicline 0.5 mg tablet Commonly known as:  Kelin Boop Take 1 Tab by mouth two (2) times a day. VITAMIN B-12 100 mcg tablet Generic drug:  cyanocobalamin Take 100 mcg by mouth daily. VITAMIN D3 1,000 unit Cap Generic drug:  cholecalciferol Take  by mouth. * Notice:   This list has 4 medication(s) that are the same as other medications prescribed for you. Read the directions carefully, and ask your doctor or other care provider to review them with you. Prescriptions Sent to Pharmacy Refills  
 varenicline (CHANTIX) 0.5 mg tablet 5 Sig: Take 1 Tab by mouth two (2) times a day. Class: Normal  
 Pharmacy: [id number] [address] [facility] #: [phone] Route: Oral  
  
Follow-up Instructions Return in about 4 months (around 10/28/2018) for 30 minutes. To-Do List   
 06/28/2018 Lab:  HEMOGLOBIN A1C WITH EAG   
  
 06/28/2018 Lab:  LIPID PANEL   
  
 06/28/2018 Lab:  METABOLIC PANEL, COMPREHENSIVE Patient Instructions Look up the 5 and 2 diet- (2 days of very low calories-i.e. 500 calories) and then the other days have no restriction. DASH Diet: Care Instructions Your Care Instructions The DASH diet is an eating plan that can help lower your blood pressure. DASH stands for Dietary Approaches to Stop Hypertension. Hypertension is high blood pressure. The DASH diet focuses on eating foods that are high in calcium, potassium, and magnesium. These nutrients can lower blood pressure. The foods that are highest in these nutrients are fruits, vegetables, low-fat dairy products, nuts, seeds, and legumes. But taking calcium, potassium, and magnesium supplements instead of eating foods that are high in those nutrients does not have the same effect. The DASH diet also includes whole grains, fish, and poultry. The DASH diet is one of several lifestyle changes your doctor may recommend to lower your high blood pressure. Your doctor may also want you to decrease the amount of sodium in your diet. Lowering sodium while following the DASH diet can lower blood pressure even further than just the DASH diet alone. Follow-up care is a key part of your treatment and safety.  Be sure to make and go to all appointments, and call your doctor if you are having problems. It's also a good idea to know your test results and keep a list of the medicines you take. How can you care for yourself at home? Following the DASH diet · Eat 4 to 5 servings of fruit each day. A serving is 1 medium-sized piece of fruit, ½ cup chopped or canned fruit, 1/4 cup dried fruit, or 4 ounces (½ cup) of fruit juice. Choose fruit more often than fruit juice. · Eat 4 to 5 servings of vegetables each day. A serving is 1 cup of lettuce or raw leafy vegetables, ½ cup of chopped or cooked vegetables, or 4 ounces (½ cup) of vegetable juice. Choose vegetables more often than vegetable juice. · Get 2 to 3 servings of low-fat and fat-free dairy each day. A serving is 8 ounces of milk, 1 cup of yogurt, or 1 ½ ounces of cheese. · Eat 6 to 8 servings of grains each day. A serving is 1 slice of bread, 1 ounce of dry cereal, or ½ cup of cooked rice, pasta, or cooked cereal. Try to choose whole-grain products as much as possible. · Limit lean meat, poultry, and fish to 2 servings each day. A serving is 3 ounces, about the size of a deck of cards. · Eat 4 to 5 servings of nuts, seeds, and legumes (cooked dried beans, lentils, and split peas) each week. A serving is 1/3 cup of nuts, 2 tablespoons of seeds, or ½ cup of cooked beans or peas. · Limit fats and oils to 2 to 3 servings each day. A serving is 1 teaspoon of vegetable oil or 2 tablespoons of salad dressing. · Limit sweets and added sugars to 5 servings or less a week. A serving is 1 tablespoon jelly or jam, ½ cup sorbet, or 1 cup of lemonade. · Eat less than 2,300 milligrams (mg) of sodium a day. If you limit your sodium to 1,500 mg a day, you can lower your blood pressure even more. Tips for success · Start small. Do not try to make dramatic changes to your diet all at once.  You might feel that you are missing out on your favorite foods and then be more likely to not follow the plan. Make small changes, and stick with them. Once those changes become habit, add a few more changes. · Try some of the following: ¨ Make it a goal to eat a fruit or vegetable at every meal and at snacks. This will make it easy to get the recommended amount of fruits and vegetables each day. ¨ Try yogurt topped with fruit and nuts for a snack or healthy dessert. ¨ Add lettuce, tomato, cucumber, and onion to sandwiches. ¨ Combine a ready-made pizza crust with low-fat mozzarella cheese and lots of vegetable toppings. Try using tomatoes, squash, spinach, broccoli, carrots, cauliflower, and onions. ¨ Have a variety of cut-up vegetables with a low-fat dip as an appetizer instead of chips and dip. ¨ Sprinkle sunflower seeds or chopped almonds over salads. Or try adding chopped walnuts or almonds to cooked vegetables. ¨ Try some vegetarian meals using beans and peas. Add garbanzo or kidney beans to salads. Make burritos and tacos with mashed hudson beans or black beans. Where can you learn more? Go to http://cristo-german.info/. Enter Q718 in the search box to learn more about \"DASH Diet: Care Instructions. \" Current as of: September 21, 2016 Content Version: 11.4 © 4507-8331 [employer]uck. Introducing [facility]! Dear Adán Britton: Thank you for requesting a Sqoot account. Our records indicate that you already have an active Sqoot account. You can access your account anytime at [email or web address] Did you know that you can access your hospital and ER discharge 2-3x/week instructions at any time in ImpulseFlyer? You can also review all of your test results from your hospital stay or ER visit. Additional Information If you have questions, please visit the Frequently Asked Questions section of the ImpulseFlyer website at [email or web address] Remember, ImpulseFlyer is NOT to be used for urgent needs. For medical emergencies, dial 911. Now available from your iPhone and Android! Please provide this summary of care documentation to your next provider. Your primary care clinician is listed as AKSHAT Cabrera. If you have any questions after today's visit, please call [phone].

## 2024-01-22 NOTE — PROGRESS NOTE ADULT - PROBLEM SELECTOR PLAN 9
Pt on novolin 70/30 38 units QAM and 34 units QPM  - Lantus 22 units QHS and admelog 10 units TID w/ meals w/ low dose ISS Pt on novolin 70/30 38 units QAM and 34 units QPM  - Lantus 22 units QHS and admelog 10 units TID w/ meals w/ low dose ISS  --> can resume home regimen on discharge

## 2024-01-22 NOTE — DISCHARGE NOTE PROVIDER - CARE PROVIDER_API CALL
PCP,   Phone: (   )    -  Fax: (   )    -  Follow Up Time:    Raman Ramos Xie  Internal Medicine  [address] [facility], [address] [place] [address]  Phone: [phone]  Fax: [phone]  Follow Up Time:

## 2024-01-22 NOTE — DISCHARGE NOTE PROVIDER - HOSPITAL COURSE
64 y/o Male, from [facility], with a PmHx of CAD, CHF, DM, CYNTHIA on nocturnal bipap, COPD (multiple intubations in past on 3L of home O2), peripheral neuropathy, GERD, HTN, HLD, obesity, polyarthritis, Pulmonary HTN, TIA, p/w increased agitation and paranoia at NH.     Paranoia, Hx BPD   - Pt presenting with worsening agitation, pt hearing voices and with paranoia. He states he thinks someone by the name at [facility] G Lai at [facility] has been able to hack into his phone conversations. He denies any SI.   - C/w home meds: Xanax, Gabapentin, Melatonin, Trazodone   - Linda consulted: Pt has not required PRNs; Cleared from psych to return to facility      UTI  - UA positive, pt endorsing some mild dysuria. May possibly be exacerbating hallucinations. s/p IV ceftriaxone in ED.  - S/p IV ceftriaxone x3d course (1/19-1/21)  - UCX collected after abx     COPD  - Not in exacerbation, on baseline 3L O2. Per NH records, pt started on prednisone taper for COPD exacerbation 1/10.   - C/w prednisone 10 mg QD for 5 days to completion  - Symbicort BID  - Duonebs q6h  - PT no needs     HTN  - Does not appear to be on any medications.  - Stable     DM  - A1c 9.4   - Pt on novolin 70/30 38 units QAM and 34 units QPM  - While inpt received lantus 22 units QHS and admelog 10 units TID w/ meals w/ low dose ISS.    CYNTHIA treated with BiPAP  - C/w nocturnal bipap    CAD  - C/w asa and atorvastatin 63M from [facility], CAD, CHF, DM, CYNTHIA on nocturnal bipap, COPD (multiple intubations in past on 3L of home O2), peripheral neuropathy, GERD, HTN, HLD, obesity, polyarthritis, Pulmonary HTN, TIA, p/w increased agitation and paranoia at NH.      Seen by psych, initially had paranoia and delusions, treated for UTI with 3d CTX (Ucx after abx thus likely won't be helpful), calm throughout admission, no PRNs required. Cleared for return to LTC  continued home meds: Xanax, Gabapentin, Melatonin, Trazodone     COPD Not in exacerbation, on baseline 3L O2, continued on prednisone taper for previous COPD exacerbation, c/w home nebs/MDIs.    DM A1c 9.4 Home: Pt on novolin 70/30 38 units QAM and 34 units QPM. While inpt received lantus 22 units QHS and admelog 10 units TID w/ meals w/ low dose ISS.    Stable for return to LTC.

## 2024-01-22 NOTE — DISCHARGE NOTE PROVIDER - NSDCFUADDAPPT_GEN_ALL_CORE_FT
Follow up with your primary care physician for further monitoring in 1-2 weeks. Please call to arrange appointment.  Follow up with Dr. Ramos at [facility]

## 2024-01-22 NOTE — BH CONSULTATION LIAISON PROGRESS NOTE - NSBHFUPINTERVALHXFT_PSY_A_CORE
Chart reviewed, did not get any psychiatric PRNs, case discussed in IDRs, as per staff, pt. in good behavioral control, pleasant/polite. Patient seen, oriented, well engaged, denies any mood sxs, no evidence of acute psychosis, denies CAH,  firmly denies passive or active SIIP, denies HIIP.

## 2024-01-22 NOTE — BH CONSULTATION LIAISON PROGRESS NOTE - CURRENT MEDICATION
MEDICATIONS  (STANDING):  albuterol/ipratropium for Nebulization 3 milliLiter(s) Nebulizer every 6 hours  ALPRAZolam 0.5 milliGRAM(s) Oral three times a day  aspirin  chewable 81 milliGRAM(s) Oral daily  atorvastatin 40 milliGRAM(s) Oral at bedtime  budesonide  80 MICROgram(s)/formoterol 4.5 MICROgram(s) Inhaler 2 Puff(s) Inhalation two times a day  dextrose 5%. 1000 milliLiter(s) (50 mL/Hr) IV Continuous <Continuous>  dextrose 50% Injectable 25 Gram(s) IV Push once  enoxaparin Injectable 40 milliGRAM(s) SubCutaneous every 24 hours  finasteride 5 milliGRAM(s) Oral daily  gabapentin 100 milliGRAM(s) Oral three times a day  glucagon  Injectable 1 milliGRAM(s) IntraMuscular once  insulin glargine Injectable (LANTUS) 22 Unit(s) SubCutaneous at bedtime  insulin lispro (ADMELOG) corrective regimen sliding scale   SubCutaneous at bedtime  insulin lispro (ADMELOG) corrective regimen sliding scale   SubCutaneous three times a day before meals  insulin lispro Injectable (ADMELOG) 10 Unit(s) SubCutaneous three times a day before meals  latanoprost 0.005% Ophthalmic Solution 1 Drop(s) Both EYES at bedtime  melatonin 6 milliGRAM(s) Oral at bedtime  nicotine -   7 mG/24Hr(s) Patch 1 Patch Transdermal daily  pantoprazole    Tablet 40 milliGRAM(s) Oral before breakfast  polyethylene glycol 3350 17 Gram(s) Oral daily  predniSONE   Tablet 10 milliGRAM(s) Oral daily  senna 2 Tablet(s) Oral at bedtime  tamsulosin 0.4 milliGRAM(s) Oral at bedtime  traZODone 150 milliGRAM(s) Oral at bedtime    MEDICATIONS  (PRN):  acetaminophen     Tablet .. 650 milliGRAM(s) Oral every 6 hours PRN Temp greater or equal to 38C (100.4F), Mild Pain (1 - 3)  dextrose Oral Gel 15 Gram(s) Oral once PRN Blood Glucose LESS THAN 70 milliGRAM(s)/deciliter  guaiFENesin Oral Liquid (Sugar-Free) 200 milliGRAM(s) Oral every 6 hours PRN Cough  haloperidol    Injectable 2.5 milliGRAM(s) IntraMuscular every 6 hours PRN Agitation  oxyCODONE    IR 5 milliGRAM(s) Oral every 8 hours PRN Severe Pain (7 - 10)

## 2024-01-22 NOTE — BH CONSULTATION LIAISON PROGRESS NOTE - NSBHCONSULTFOLLOWAFTERCARE_PSY_A_CORE FT
Follow up with the psychiatrist at the facility.    [facility] Outpatient services  For acute crisis: [facility]  [address] [place] [address], First Floor, [place] [address]  [phone]  [email or web address]

## 2024-01-22 NOTE — PHYSICAL THERAPY INITIAL EVALUATION ADULT - GENERAL OBSERVATIONS, REHAB EVAL
- - - Pt received semi-supine in bed, +nasal cannula, all lines intact, in NAD. Pt agreeable to participate in PT evaluation.

## 2024-01-22 NOTE — DISCHARGE NOTE PROVIDER - NSDCMRMEDTOKEN_GEN_ALL_CORE_FT
ALPRAZolam 0.5 mg oral tablet: 1 tab(s) orally 3 times a day  aspirin 81 mg oral tablet: orally once a day  atorvastatin 40 mg oral tablet: 1 tab(s) orally once a day  finasteride 5 mg oral tablet: 1 tab(s) orally once a day  Flomax 0.4 mg oral capsule: 1 cap(s) orally once a day (at bedtime)  gabapentin 100 mg oral capsule: 1 cap(s) orally 3 times a day  ipratropium-albuterol 0.5 mg-2.5 mg/3 mL inhalation solution: 3 milliliter(s) by nebulizer 4 times a day  latanoprost 0.005% ophthalmic solution: 1 drop(s) in each eye once a day (at bedtime)  melatonin 3 mg oral tablet: 2 tab(s) orally once a day (at bedtime)  nicotine 7 mg/24 hr transdermal film, extended release: 1 patch transdermally once a day remove at bedtime  NovoLIN 70/30 FlexPen subcutaneous suspension: 34 unit(s) subcutaneous once a day once a day in the evening at 4:30PM  NovoLIN 70/30 FlexPen subcutaneous suspension: 38 international unit(s) subcutaneous once a day in the morning at 7:30 AM  oxycodone-acetaminophen 5 mg-325 mg oral tablet: 1 tab(s) orally 3 times a day as needed for  severe pain  pantoprazole 40 mg oral delayed release tablet: 1 tab(s) orally once a day  polyethylene glycol 3350 oral powder for reconstitution: 17 gram(s) orally once a day  predniSONE 10 mg oral tablet: 1 tab(s) orally once a day for 2 more days (1/23-1/24)  senna (sennosides) 8.6 mg oral tablet: 2 tab(s) orally once a day (at bedtime)  Symbicort 80 mcg-4.5 mcg/inh inhalation aerosol: 2 puff(s) inhaled 2 times a day  traZODone 150 mg oral tablet: 1 tab(s) orally once a day (at bedtime)

## 2024-01-22 NOTE — PHYSICAL THERAPY INITIAL EVALUATION ADULT - PERTINENT HX OF CURRENT PROBLEM, REHAB EVAL
63 year old Male, from [facility], past medical history stated below presents with increased agitation and hallucination at nursing home.

## 2024-01-22 NOTE — BH CONSULTATION LIAISON PROGRESS NOTE - NSBHCHARTREVIEWVS_PSY_A_CORE FT
Vital Signs Last 24 Hrs  T(C): 36.8 (22 Jan 2024 04:46), Max: 36.8 (22 Jan 2024 04:46)  T(F): 98.2 (22 Jan 2024 04:46), Max: 98.2 (22 Jan 2024 04:46)  HR: 98 (22 Jan 2024 04:46) (77 - 98)  BP: 100/60 (22 Jan 2024 04:46) (100/60 - 108/63)  BP(mean): --  RR: 18 (22 Jan 2024 04:46) (18 - 18)  SpO2: 98% (22 Jan 2024 04:46) (96% - 100%)    Parameters below as of 22 Jan 2024 04:46  Patient On (Oxygen Delivery Method): nasal cannula  O2 Flow (L/min): 3

## 2024-01-22 NOTE — PROGRESS NOTE ADULT - PROBLEM SELECTOR PLAN 3
U/A positive, pt endorsing some mild dysuria. May possibly be exacerbating hallucinations. s/p IV ceftriaxone in ED.  - c/w IV ceftriaxone  - Urine culture not collected before antibiotics administered. Will attempt to collect sample today, though results may be affected U/A positive, pt endorsing some mild dysuria. May possibly be exacerbating hallucinations. s/p IV ceftriaxone in ED.  - completed 3d IV ceftriaxone  - Urine culture not collected before antibiotics administered, unlikely cx will be helpful  clinically improved

## 2024-01-22 NOTE — PROGRESS NOTE ADULT - PROBLEM SELECTOR PLAN 4
Not in exacerbation, on baseline 3L O2. Per NH records, pt started on prednisone taper for COPD exacerbation 1/10.   - c/w prednisone 10 mg QD for 5 days to completion  - Symbicort BID  - Duoneb q6h
Not in exacerbation, on baseline 3L O2. Per NH records, pt started on prednisone taper for COPD exacerbation 1/10.   - c/w prednisone 10 mg QD for 5 days to completion  - Symbicort BID  - Duoneb q6h
Not in exacerbation, on baseline 3L O2. Per NH records, pt started on prednisone taper for COPD exacerbation 1/10.   - c/w prednisone 10 mg QD for 5 days to completion  - symbicort BID  - duonebs q6h

## 2024-01-23 ENCOUNTER — APPOINTMENT (OUTPATIENT)
Dept: PULMONOLOGY | Facility: CLINIC | Age: 64
End: 2024-01-23

## 2024-01-23 RX ORDER — INSULIN NPH HUM/REG INSULIN HM 70-30/ML
34 VIAL (ML) SUBCUTANEOUS
Refills: 0 | DISCHARGE

## 2024-01-23 RX ORDER — LANOLIN ALCOHOL/MO/W.PET/CERES
2 CREAM (GRAM) TOPICAL
Refills: 0 | DISCHARGE

## 2024-01-23 RX ORDER — TAMSULOSIN HYDROCHLORIDE 0.4 MG/1
1 CAPSULE ORAL
Refills: 0 | DISCHARGE

## 2024-01-23 RX ORDER — GABAPENTIN 400 MG/1
1 CAPSULE ORAL
Refills: 0 | DISCHARGE

## 2024-01-23 RX ORDER — ACETAMINOPHEN 500 MG
2 TABLET ORAL
Refills: 0 | DISCHARGE

## 2024-01-23 RX ORDER — BUDESONIDE AND FORMOTEROL FUMARATE DIHYDRATE 160; 4.5 UG/1; UG/1
2 AEROSOL RESPIRATORY (INHALATION)
Refills: 0 | DISCHARGE

## 2024-01-23 RX ORDER — INSULIN NPH HUM/REG INSULIN HM 70-30/ML
38 VIAL (ML) SUBCUTANEOUS
Refills: 0 | DISCHARGE

## 2024-01-23 RX ORDER — TRAZODONE HCL 50 MG
1 TABLET ORAL
Refills: 0 | DISCHARGE

## 2024-01-23 RX ORDER — OXYCODONE AND ACETAMINOPHEN 5; 325 MG/1; MG/1
1 TABLET ORAL
Qty: 0 | Refills: 0 | DISCHARGE

## 2024-01-23 RX ORDER — IPRATROPIUM/ALBUTEROL SULFATE 18-103MCG
3 AEROSOL WITH ADAPTER (GRAM) INHALATION
Refills: 0 | DISCHARGE

## 2024-01-23 RX ORDER — POLYETHYLENE GLYCOL 3350 17 G/17G
17 POWDER, FOR SOLUTION ORAL
Refills: 0 | DISCHARGE

## 2024-01-23 RX ORDER — ATORVASTATIN CALCIUM 80 MG/1
1 TABLET, FILM COATED ORAL
Refills: 0 | DISCHARGE

## 2024-01-23 RX ORDER — FINASTERIDE 5 MG/1
1 TABLET, FILM COATED ORAL
Refills: 0 | DISCHARGE

## 2024-01-23 RX ORDER — LATANOPROST 0.05 MG/ML
1 SOLUTION/ DROPS OPHTHALMIC; TOPICAL
Refills: 0 | DISCHARGE

## 2024-01-23 RX ORDER — ASPIRIN/CALCIUM CARB/MAGNESIUM 324 MG
0 TABLET ORAL
Refills: 0 | DISCHARGE

## 2024-01-23 RX ORDER — PANTOPRAZOLE SODIUM 20 MG/1
1 TABLET, DELAYED RELEASE ORAL
Refills: 0 | DISCHARGE

## 2024-01-23 RX ORDER — ALPRAZOLAM 0.25 MG
1 TABLET ORAL
Refills: 0 | DISCHARGE

## 2024-01-23 RX ORDER — SENNA PLUS 8.6 MG/1
2 TABLET ORAL
Refills: 0 | DISCHARGE

## 2024-01-23 RX ORDER — NICOTINE POLACRILEX 2 MG
1 GUM BUCCAL
Refills: 0 | DISCHARGE

## 2024-01-25 LAB
CULTURE RESULTS: ABNORMAL
SPECIMEN SOURCE: SIGNIFICANT CHANGE UP

## 2024-01-28 NOTE — DISCHARGE NOTE ADULT - FUNCTIONAL SCREEN CURRENT LEVEL: DRESSING, MLM
Fell 2100 last night. Positive LOC. Positive head injury. On 7LNC at all times, baseline O2 sat 92%. Had syncopal event prior to fall from standing at home. Hx lung cancer, COPD and lobectomy. On aspirin. Witnessed fall. EMS states 71% on his 7LNC upon their arrival, was placed on NRB and O2 came up to 96%. Arrives to ER with no neurological deficits; aaox4/4 gcs 15.    2 = assistive person 0 = independent

## 2024-01-30 NOTE — ED PROVIDER NOTE - CONTEXT
"Assessment/Plan:    Calcium 1000 mg + 600-1000 IU Vit D daily. Pap with high risk HPV Q 5 years, Annual mammogram, monthly breast self exam. .  Exercise 150 minutes per week minimum.      Diagnoses and all orders for this visit:    Encounter for gynecological examination without abnormal finding  -     IGP, Aptima HPV, Rfx 16/18,45    Encounter for screening mammogram for breast cancer  -     Mammo screening bilateral w 3d & cad; Future    Encounter for Papanicolaou smear for cervical cancer screening  -     IGP, Aptima HPV, Rfx 16/18,45    Other orders  -     Liquid-based pap, screening          Subjective:      Patient ID: Maria L Cox is a 44 y.o. female.    New/former pt Here for annual gyn Last seen 2020  (sons 20 & 18 both with cerebral palsy)  Tubal LSO 2012 Remote abn pap repeated and normal Last pap 2020 neg/neg HPV no concerns or issues Periods monthly Denies abd or pelvic pain  Bowel and bladder are normal         The following portions of the patient's history were reviewed and updated as appropriate: allergies, current medications, past family history, past medical history, past social history, past surgical history, and problem list.    Review of Systems   Constitutional:  Negative for fatigue and unexpected weight change.   Gastrointestinal:  Negative for abdominal distention, abdominal pain, constipation and diarrhea.   Genitourinary:  Negative for difficulty urinating, dyspareunia, dysuria, frequency, genital sores, menstrual problem, pelvic pain, urgency, vaginal bleeding, vaginal discharge and vaginal pain.   Neurological:  Negative for headaches.   Psychiatric/Behavioral: Negative.  Negative for dysphoric mood. The patient is not nervous/anxious.          Objective:      /70 (BP Location: Right arm, Patient Position: Sitting, Cuff Size: Standard)   Ht 5' 7.75\" (1.721 m)   Wt 67.1 kg (148 lb)   LMP 2024   BMI 22.67 kg/m²          Physical Exam  Vitals and nursing " note reviewed.   Constitutional:       General: She is not in acute distress.     Appearance: Normal appearance.   HENT:      Head: Normocephalic and atraumatic.   Pulmonary:      Effort: Pulmonary effort is normal.   Chest:   Breasts:     Breasts are symmetrical.      Right: Normal. No mass, nipple discharge, skin change or tenderness.      Left: Normal. No mass, nipple discharge, skin change or tenderness.   Abdominal:      General: There is no distension.      Palpations: Abdomen is soft.      Tenderness: There is no abdominal tenderness. There is no guarding or rebound.   Genitourinary:     General: Normal vulva.      Exam position: Lithotomy position.      Labia:         Right: No rash, tenderness, lesion or injury.         Left: No rash, tenderness, lesion or injury.       Urethra: No prolapse, urethral pain, urethral swelling or urethral lesion.      Vagina: Normal. No erythema or lesions.      Cervix: No cervical motion tenderness, discharge, lesion or cervical bleeding.      Uterus: Normal.       Adnexa: Right adnexa normal and left adnexa normal.        Right: No mass or tenderness.          Left: No mass or tenderness.        Rectum: No mass or external hemorrhoid.      Comments: PAP from cervix   Musculoskeletal:         General: Normal range of motion.   Lymphadenopathy:      Upper Body:      Right upper body: No axillary adenopathy.      Left upper body: No axillary adenopathy.      Lower Body: No right inguinal adenopathy. No left inguinal adenopathy.   Skin:     General: Skin is warm and dry.   Neurological:      Mental Status: She is alert and oriented to person, place, and time.   Psychiatric:         Mood and Affect: Mood normal.         Behavior: Behavior normal.         Thought Content: Thought content normal.         Judgment: Judgment normal.          CPAP malfunction/known exposure (describe)

## 2024-02-09 NOTE — PROGRESS NOTE ADULT - PROBLEM SELECTOR PLAN 9
[facility], [facility]   PT/NURSE  [phone]   xanax prn

## 2024-03-12 NOTE — ACUTE INTERFACILITY TRANSFER NOTE - HOSPITAL COURSE
Patient notified via e-advice of follow up advice (per his request).  Future labs ordered.     58 Yo male from assisted living with a PMHx of HTN, HLD, CAD, DM, COPD ( on home oxygen 2L) , bipolar disorder  and a PSHx of a stented CAD reports to the ED  c/o shortness of breath for 2 days associated with chest congestion. admitted For CAP likely due to viral PNA, r/o covid. Also with COPD exacerbation and hyperglycemia.

## 2024-03-14 NOTE — H&P ADULT - NSHPLABSRESULTS_GEN_ALL_CORE
.  LABS:                         11.8   9.35  )-----------( 225      ( 19 Jan 2024 19:09 )             36.7     01-19    135  |  96<L>  |  16  ----------------------------<  310<H>  4.7   |  30  |  0.64    Ca    8.8      19 Jan 2024 21:26  Phos  3.5     01-19  Mg     1.90     01-19    TPro  6.8  /  Alb  3.8  /  TBili  0.2  /  DBili  x   /  AST  32  /  ALT  35  /  AlkPhos  65  01-19      Urinalysis Basic - ( 19 Jan 2024 21:26 )    Color: x / Appearance: x / SG: x / pH: x  Gluc: 310 mg/dL / Ketone: x  / Bili: x / Urobili: x   Blood: x / Protein: x / Nitrite: x   Leuk Esterase: x / RBC: x / WBC x   Sq Epi: x / Non Sq Epi: x / Bacteria: x      CARDIAC MARKERS ( 19 Jan 2024 19:09 )  x     / x     / x     / x     / 2.3 ng/mL            RADIOLOGY, EKG & ADDITIONAL TESTS: Reviewed. Male

## 2024-04-23 NOTE — H&P ADULT - HISTORY OF PRESENT ILLNESS
The following medication was given:     MEDICATION:  Lidocaine with epinephrine 1% [id number]  ROUTE: SQ  SITE: see procedure note  DOSE: 3 mL  LOT #: [id number]  : [employer]  EXPIRATION DATE: 4/30/25  NDC#: 26250-865-26  Was there drug waste? No  Multi-dose vial: Yes    Puracol, Dermabond, Vaseline and pressure dressing applied to Mohs site on right anterior lower leg.  Wound care instructions reviewed with patient and AVS provided.  Patient verbalized understanding.  Patient will follow up for suture removal: N/A.  No further questions or concerns at this time.    Bhavana Galarza RN  April 23, 2024      61 year old Male, from assisted living  and medical history of CAD, CHF on BIPAP at NH, COPD (intubated ,later extubated a week ago), peripheral neuropathy, GERD, HTN, HLD, obesity, polyarthritis, Pulmonary HTN,  was brought in by EMS for shortness of breath. Patient was discharged from hospital, he states he started smoking again at his assisted living, this morning he has so much work of breathing so was sent to ER for further assessment. Patient denied having any chest pain, palpitations fever, nausea vomiting diarrhea or any other complains.

## 2024-05-29 NOTE — PROGRESS NOTE ADULT - PROBLEM SELECTOR PLAN 1
Repeat comprehensive metabolic panel today to monitor electrolytes, kidney functions, and liver functions. Otherwise no change to plan of care. No medication changes.  Continue G/J tube feedings and flushes as previously ordered  Monitor for signs and symptoms of new or recurrent infection, or G/J-tube malfunction, and seek follow up care as detailed below.  Please keep upcoming office visit with General Surgery at [facility] on 06/04/24 as previously scheduled to discuss placement of a dedicated J-tube.  Please keep upcoming office visit with your [facility] primary care provider on 06/26/24 as previously scheduled.  Please keep upcoming office visit with Gastroenterology at [facility] on 07/03/24 as previously scheduled      Seek follow up medical care for signs and symptoms of infection or G/J tube malfunction including:   New or worsening fever >100.4°F  Fever with chills, sweats, body aches, and fatigue  New or worsening cough with shortness of breath or yellow/green/brown phlegm  New or worsening nausea, vomiting, diarrhea, or constipation  Large volume discharge from your G/J-tube  Loss of patency of G/J-tube (inability to flush)  Sever abdominal pain of either sudden or gradual onset  New or worsening lower abdominal or pelvic pain, pain or burning with urination, increased urinary frequency, incomplete bladder emptying, or foul smelling urine  New or worsening skin lesions or wounds with redness, swelling, heat, or tenderness (pain)  Purulent (pus-like) drainage from wounds  Foul odor from wounds    Readmitted for hypercapnic respiratory failure. PCO2 greater than 120. Started on BIPAP therapy without improvement. Patient intubated in ED. Extubated 3/25.  Start AVAPS at night and as needed during the day. Oxygen 2LPM when not on AVAPS.  Has [facility] and oxygen at [facility]. Will have [facility] check equipment at [facility].  End stage COPD. GOLD 4D  Continue bronchodilators and ICS  Decrease solumedrol to every 12 hours.  Recommend starting Daliresp. Would benefit from outpatient PFTs and Pulmonary.

## 2024-05-30 NOTE — H&P ADULT - GASTROINTESTINAL DETAILS
Myriam,    Specialty Medication PA    Thank you,  Luz Michaels, [facility]  [facility] PA Team    
Prior Authorization Not Needed per Insurance    Medication: BIKTARVY -25 MG PO TABS  Insurance Company: [employer] - Phone [phone] Fax [phone]  Expected CoPay: $    Pharmacy Filling the Rx: [facility] #[id number] [place], [place] - [address]  Pharmacy Notified: Yes  Patient Notified:         
Prior Authorization Retail Medication Request    Medication/Dose: bictegravir-emtricitabine-tenofovir (BIKTARVY) -25 MG per tablet Take 1 tablet by mouth daily  Diagnosis and ICD code (if different than what is on RX):  B20  New/renewal/insurance change PA/secondary ins. PA:  Previously Tried and Failed: Descovy last in 2020  Patient wants quantity exception   Rationale:  Patient travels often and is sometimes gone when prescriptions run out due to only being able to fill for 30 days at a time.     Insurance   Primary:   [facility]     Insurance ID:  [id number]        J Carlos Villatoro RN  Infectious Disease on 5/17/2024 at 11:50 AM    
Retail Pharmacy Prior Authorization Team   Phone: [phone]    PA Initiation    Medication: BIKTARVY -25 MG PO TABS  Insurance Company:    Pharmacy Filling the Rx: [id number] [place], [place] - [address]  Filling Pharmacy Phone: [phone]  Filling Pharmacy Fax: [phone]  Start Date: 5/30/2024          
nontender/soft/bowel sounds normal/no distention/no masses palpable

## 2024-06-05 NOTE — PATIENT PROFILE ADULT - NSASFUNCLEVELADLTOILET_GEN_A_NUR
Subjective:     Patient ID: Darrion Bennett is a 76 y.o. male.    Chief Complaint: No chief complaint on file.    Mr Bennett is a 77 yo male sent in consultation by Dr. Mccollum for evaluation for the healthy back cervical program.  he has midbackk pain for 5 years no event started pain and pain has been constant and has been increasing.  The pain is midline thoracic back dominant, and radiates up the back to the neck.  The pain is achy and sharp.  There is no tingling, numbness,or burning.  There is weakness in back.  The pain is constant 6-8/10.  It is worse with looking down, turning to the left, sits, stands, lifting, bending, walking and climbing stairs.  It is better with lying on heating pad and on back with knees elevated. He has been to pt for his back with no relief.  He has not been to chiropractor had injections or surgery.  His goals are to stand, walking and lift.     X-ray thoracic 2/2024  Thoracic spine two views: There is aortic plaque.  There is baseline DJD.  There are lower thoracic chronic wedge deformities unchanged from 02/27/2019.     Impression:     No acute process seen.     Chronic changes as above.    X-ray thoracic 2019  Bones are demineralized.  Compression T12 anteriorly.  Mild disc space narrowing.  Mild exaggeration of the normal kyphosis at T12.     IMPRESSION:      There is compression of T12 though this was seen on the chest x-ray of 02/20/2019.  Otherwise this is age indeterminate.    X-ray cervical 2019  Bones are demineralized.  Disc space narrowing C4-5, C5-6 and C6-7.  Vascular calcification in the region of the carotid vessels.  Tip of the odontoid not seen on the frontal projection.     IMPRESSION:      Degenerative changes above.    Past Medical History:  No date: Anemia  No date: Arthritis  No date: Cataract  No date: CHF (congestive heart failure)  No date: Chronic constipation  No date: Diabetes mellitus, type 2  05/11/2019: Felon of finger of left hand  No date:  Glaucoma  05/13/2014: Hyperlipidemia  No date: Hypertension  No date: MCTD (mixed connective tissue disease)  No date: Personal history of colonic polyps  No date: Sjogren's disease  No date: Ulcerative colitis  No date: Urolithiasis    Past Surgical History:  2005: CATARACT EXTRACTION; Bilateral  2014: COLONOSCOPY  9/8/2023: ESOPHAGOGASTRODUODENOSCOPY; N/A      Comment:  Procedure: EGD (ESOPHAGOGASTRODUODENOSCOPY);  Surgeon:                Divya Saenz MD;  Location: [facility] ENDO;  Service:                Endoscopy;  Laterality: N/A;  ref by / inst                portal-RB  11/22/2023: ESOPHAGOGASTRODUODENOSCOPY; N/A      Comment:  Procedure: EGD (ESOPHAGOGASTRODUODENOSCOPY);  Surgeon:                Crystal Urbina MD;  Location: [facility] ENDO;  Service:                Endoscopy;  Laterality: N/A;  time frame 8-12 weeksDr.                Dany pt prep instructions sent to pt via portal                [employer] meds trulicity hold 7 days                [id number]- pt r/s to earlier date, pre call                complete.  DBM  No date: EYE SURGERY    Review of patient's family history indicates:  Problem: Hypertension      Relation: Father          Name:               Age of Onset: (Not Specified)  Problem: Stroke      Relation: Father          Name:               Age of Onset: (Not Specified)  Problem: Cataracts      Relation: Father          Name:               Age of Onset: (Not Specified)  Problem: Glaucoma      Relation: Father          Name:               Age of Onset: (Not Specified)  Problem: Cataracts      Relation: Mother          Name:               Age of Onset: (Not Specified)  Problem: No Known Problems      Relation: Sister          Name:               Age of Onset: (Not Specified)  Problem: No Known Problems      Relation: Brother          Name:               Age of Onset: (Not Specified)  Problem: Rheum arthritis      Relation: Maternal Aunt          Name:               Age of Onset:  (Not Specified)  Problem: Rheum arthritis      Relation: Maternal Uncle          Name:               Age of Onset: (Not Specified)  Problem: No Known Problems      Relation: Paternal Aunt          Name:               Age of Onset: (Not Specified)  Problem: No Known Problems      Relation: Paternal Uncle          Name:               Age of Onset: (Not Specified)  Problem: No Known Problems      Relation: Maternal Grandmother          Name:               Age of Onset: (Not Specified)  Problem: No Known Problems      Relation: Maternal Grandfather          Name:               Age of Onset: (Not Specified)  Problem: Throat cancer      Relation: Paternal Grandmother          Name:               Age of Onset: (Not Specified)  Problem: Glaucoma      Relation: Paternal Grandfather          Name:               Age of Onset: (Not Specified)  Problem: No Known Problems      Relation: Daughter          Name:               Age of Onset: (Not Specified)  Problem: No Known Problems      Relation: Son          Name: x2              Age of Onset: (Not Specified)  Problem: Celiac disease      Relation: Neg Hx          Name:               Age of Onset: (Not Specified)  Problem: Colon cancer      Relation: Neg Hx          Name:               Age of Onset: (Not Specified)  Problem: Cirrhosis      Relation: Neg Hx          Name:               Age of Onset: (Not Specified)  Problem: Colon polyps      Relation: Neg Hx          Name:               Age of Onset: (Not Specified)  Problem: Crohn's disease      Relation: Neg Hx          Name:               Age of Onset: (Not Specified)  Problem: Cystic fibrosis      Relation: Neg Hx          Name:               Age of Onset: (Not Specified)  Problem: Hemochromatosis      Relation: Neg Hx          Name:               Age of Onset: (Not Specified)  Problem: Esophageal cancer      Relation: Neg Hx          Name:               Age of Onset: (Not Specified)  Problem: Inflammatory bowel disease       Relation: Neg Hx          Name:               Age of Onset: (Not Specified)  Problem: Irritable bowel syndrome      Relation: Neg Hx          Name:               Age of Onset: (Not Specified)  Problem: Liver cancer      Relation: Neg Hx          Name:               Age of Onset: (Not Specified)  Problem: Liver disease      Relation: Neg Hx          Name:               Age of Onset: (Not Specified)  Problem: Rectal cancer      Relation: Neg Hx          Name:               Age of Onset: (Not Specified)  Problem: Stomach cancer      Relation: Neg Hx          Name:               Age of Onset: (Not Specified)  Problem: Ulcerative colitis      Relation: Neg Hx          Name:               Age of Onset: (Not Specified)  Problem: Chase's disease      Relation: Neg Hx          Name:               Age of Onset: (Not Specified)  Problem: Amblyopia      Relation: Neg Hx          Name:               Age of Onset: (Not Specified)  Problem: Blindness      Relation: Neg Hx          Name:               Age of Onset: (Not Specified)  Problem: Cancer      Relation: Neg Hx          Name:               Age of Onset: (Not Specified)  Problem: Diabetes      Relation: Neg Hx          Name:               Age of Onset: (Not Specified)  Problem: Macular degeneration      Relation: Neg Hx          Name:               Age of Onset: (Not Specified)  Problem: Retinal detachment      Relation: Neg Hx          Name:               Age of Onset: (Not Specified)  Problem: Strabismus      Relation: Neg Hx          Name:               Age of Onset: (Not Specified)  Problem: Thyroid disease      Relation: Neg Hx          Name:               Age of Onset: (Not Specified)  Problem: Melanoma      Relation: Neg Hx          Name:               Age of Onset: (Not Specified)      Social History    Socioeconomic History      Marital status:       Number of children: 3    Tobacco Use      Smoking status: Never        Passive exposure: Never      Smokeless  tobacco: Never    Substance and Sexual Activity      Alcohol use: No      Drug use: Yes        Comment: ultram 1 - 2 tabs a week      Sexual activity: Not Currently        Partners: Female    Social Determinants of Health  Financial Resource Strain: Low Risk  (2/12/2024)      Overall Financial Resource Strain (CARDIA)          Difficulty of Paying Living Expenses: Not very hard  Recent Concern: Financial Resource Strain - Medium Risk (11/27/2023)      Overall Financial Resource Strain (CARDIA)          Difficulty of Paying Living Expenses: Somewhat hard  Food Insecurity: Food Insecurity Present (2/12/2024)      Hunger Vital Sign          Worried About Running Out of Food in the Last Year: Sometimes true          Ran Out of Food in the Last Year: Sometimes true  Transportation Needs: No Transportation Needs (2/12/2024)      PRAPARE - Transportation          Lack of Transportation (Medical): No          Lack of Transportation (Non-Medical): No  Physical Activity: Unknown (2/12/2024)      Exercise Vital Sign          Days of Exercise per Week: 0 days  Stress: No Stress Concern Present (2/12/2024)      [employer] [place] [employer] - Occupational Stress Questionnaire          Feeling of Stress : Only a little  Housing Stability: Low Risk  (2/12/2024)      Housing Stability Vital Sign          Unable to Pay for Housing in the Last Year: No          Number of Places Lived in the Last Year: 2          Unstable Housing in the Last Year: No    Current Outpatient Medications:  abatacept (ORENCIA CLICKJECT) 125 mg/mL AtIn, Inject 125 mg into the skin once a week., Disp: 4 mL, Rfl: 11  alcohol swabs (DROPSAFE ALCOHOL PREP PADS) PadM, USE  4 TIMES PER DAY, Disp: 400 each, Rfl: 3  amLODIPine (NORVASC) 5 MG tablet, TAKE 1 TABLET TWICE DAILY, Disp: 180 tablet, Rfl: 3  ammonium lactate 12 % Crea, Apply 1 application topically once daily., Disp: 140 g, Rfl: 5  atorvastatin (LIPITOR) 80 MG tablet, TAKE 1 TABLET EVERY DAY,  "Disp: 90 tablet, Rfl: 3  blood glucose control, low (TRUE METRIX LEVEL 1) Soln, Use as indicated, Disp: 1 each, Rfl: 0  blood-glucose meter (TRUE METRIX GLUCOSE METER) Misc, Test glucose 4x/day, Disp: 1 each, Rfl: 0  blood-glucose meter,continuous (DEXCOM G7 ) Misc, Use with Dexcom G7 sensors, Disp: 1 each, Rfl: 0  blood-glucose sensor (DEXCOM G7 SENSOR) Bernie, Change every 10 days, Disp: 12 each, Rfl: 3  ciclopirox (PENLAC) 8 % Soln, Apply topically nightly., Disp: 6.6 mL, Rfl: 3   cloNIDine 0.2 mg/24 hr td ptwk (CATAPRES) 0.2 mg/24 hr, Place 1 patch onto the skin every 7 days., Disp: 12 patch, Rfl: 3  diclofenac sodium (VOLTAREN) 1 % Gel, Apply 2 g topically 4 (four) times daily as needed. Apply to aching joints (Patient not taking: Reported on 5/27/2024), Disp: 100 g, Rfl: 3  diclofenac sodium (VOLTAREN) 1 % Gel, Apply 2 g topically once daily. (Patient not taking: Reported on 5/27/2024), Disp: 100 g, Rfl: 3  dorzolamide (TRUSOPT) 2 % ophthalmic solution, INSTILL 1 DROP INTO BOTH EYES TWICE DAILY, Disp: 10 mL, Rfl: 3  DROPLET PEN NEEDLE 32 gauge x 5/32" Ndle, USE 1 NEEDLE AS DIRECTED FOUR TIMES DAILY, Disp: 400 each, Rfl: 3  dulaglutide (TRULICITY) 4.5 mg/0.5 mL pen injector, Inject 4.5 mg into the skin every 7 days., Disp: 12 pen , Rfl: 2  empagliflozin (JARDIANCE) 10 mg tablet, Take 1 tablet (10 mg total) by mouth once daily., Disp: 90 tablet, Rfl: 2  febuxostat (ULORIC) 40 mg Tab, Take 1 tablet (40 mg total) by mouth once daily., Disp: 30 tablet, Rfl: 11  ferrous gluconate 324 mg (37.5 mg iron) Tab tablet, Take 1 tablet (324 mg total) by mouth once daily. (Patient taking differently: Take 324 mg by mouth once daily. Not taking), Disp: 90 tablet, Rfl: 0  fluticasone propionate (FLONASE) 50 mcg/actuation nasal spray, 1 spray (50 mcg total) by Each Nostril route once daily., Disp: 48 g, Rfl: 11  hydrALAZINE (APRESOLINE) 25 MG tablet, TAKE 3 TABLETS THREE TIMES DAILY, Disp: 810 tablet, Rfl: 10  INJECTAFER " 50 iron mg/mL injection, , Disp: , Rfl:   insulin aspart U-100 (NOVOLOG FLEXPEN U-100 INSULIN) 100 unit/mL (3 mL) InPn pen, Inject Novolog if glucose after meal is high:  201-250=+2, 251-300=+3; 301-350=+4, over 350=+5 units (Patient not taking: Reported on 5/27/2024), Disp: 30 mL, Rfl: 10  lancets (TRUEPLUS LANCETS) 33 gauge Misc, Test glucose 4x/day. Dx code e11.65, Disp: 400 each, Rfl: 3  LINZESS 72 mcg Cap capsule, TAKE 1 CAPSULE BEFORE BREAKFAST (Patient not taking: Reported on 5/27/2024), Disp: 90 capsule, Rfl: 2  meloxicam (MOBIC) 15 MG tablet, Take 1 tablet (15 mg total) by mouth once daily. (Patient not taking: Reported on 5/27/2024), Disp: 20 tablet, Rfl: 0  multivit with min-folic acid 200 mcg Chew, , Disp: , Rfl:   omeprazole (PRILOSEC) 40 MG capsule, Take 1 capsule (40 mg total) by mouth every morning., Disp: 90 capsule, Rfl: 3  predniSONE (DELTASONE) 5 MG tablet, Take 1 tablet (5 mg total) by mouth once daily., Disp: 90 tablet, Rfl: 3  PROLIA 60 mg/mL Syrg, , Disp: , Rfl:   senna-docusate 8.6-50 mg (SENNA WITH DOCUSATE SODIUM) 8.6-50 mg per tablet, Take 1 tablet by mouth once daily., Disp: 90 tablet, Rfl: 3  torsemide (DEMADEX) 10 MG Tab, TAKE 1 TABLET EVERY OTHER DAY, Disp: 45 tablet, Rfl: 3  traMADoL (ULTRAM) 50 mg tablet, TAKE 1 TABLET EVERY 12 HOURS AS NEEDED FOR PAIN, Disp: 60 tablet, Rfl: 1  travoprost (TRAVATAN Z) 0.004 % ophthalmic solution, Place 1 drop into both eyes every evening., Disp: 3 each, Rfl: 3  triamcinolone acetonide 0.1% (KENALOG) 0.1 % cream, APPLY TOPICALLY TWICE DAILY FOR 10 DAYS, Disp: 45 g, Rfl: 1  TRUE METRIX GLUCOSE TEST STRIP Strp, TEST BLOOD SUGAR FOUR TIMES DAILY, Disp: 400 strip, Rfl: 3  valsartan (DIOVAN) 160 MG tablet, TAKE 1 TABLET TWICE DAILY, Disp: 180 tablet, Rfl: 1    No current facility-administered medications for this visit.      Review of patient's allergies indicates:   -- Imuran [azathioprine]     --  Pancytopenia   -- Penicillins -- Nausea And Vomiting    -- Rosuvastatin     --  Muscle pain   -- Allopurinol analogues -- Rash        Review of Systems   Constitutional: Negative for weight gain and weight loss.   Cardiovascular:  Negative for chest pain.   Respiratory:  Positive for shortness of breath.    Musculoskeletal:  Positive for back pain (thoracic back) and neck pain. Negative for joint pain and joint swelling.   Gastrointestinal:  Negative for abdominal pain, bowel incontinence, nausea and vomiting.   Genitourinary:  Negative for bladder incontinence.   Neurological:  Positive for dizziness. Negative for numbness and paresthesias.        Objective:     General: Darrion is well-developed, well-nourished, appears stated age, in no acute distress, alert and oriented to time, place and person.     General    Vitals reviewed.  Constitutional: He is oriented to person, place, and time. He appears well-developed and well-nourished.   HENT:   Head: Normocephalic and atraumatic.   Pulmonary/Chest: Effort normal.   Neurological: He is alert and oriented to person, place, and time.   Psychiatric: He has a normal mood and affect. His behavior is normal. Judgment and thought content normal.     General Musculoskeletal Exam   Gait: normal     Right Ankle/Foot Exam     Tests   Heel Walk: able to perform  Tiptoe Walk: able to perform    Left Ankle/Foot Exam     Tests   Heel Walk: able to perform  Tiptoe Walk: able to perform  Back (L-Spine & T-Spine) / Neck (C-Spine) Exam     Tenderness Posterior midline palpation reveals tenderness of the Lower T-Spine. Right paramedian tenderness of the Lower T-Spine. Left paramedian tenderness of the Lower T-Spine.     Back (L-Spine & T-Spine) Range of Motion   Extension:  10 (with pain)   Flexion:  80   Lateral bend right:  10   Lateral bend left:  10     Spinal Sensation   Right Side Sensation  C-Spine Level: normal   L-Spine Level: normal  S-Spine Level: normal  Left Side Sensation  C-Spine Level: normal  L-Spine Level: normal  S-Spine  Level: normal    Back (L-Spine & T-Spine) Tests   Right Side Tests  Straight leg raise:        Sitting SLR: > 70 degrees    Left Side Tests  Straight leg raise:       Sitting SLR: > 70 degrees      Other   He has no scoliosis .  Spinal Kyphosis:  present      Muscle Strength   Right Upper Extremity   Biceps: 5/5   Deltoid:  5/5  Triceps:  5/5  Wrist extension: 5/5   Finger Flexors:  5/5  Left Upper Extremity  Biceps: 5/5   Deltoid:  5/5  Triceps:  5/5  Wrist extension: 5/5   Finger Flexors:  5/5  Right Lower Extremity   Hip Flexion: 5/5   Quadriceps:  5/5   Anterior tibial:  5/5   EHL:  5/5  Left Lower Extremity   Hip Flexion: 5/5   Quadriceps:  5/5   Anterior tibial:  5/5   EHL:  5/5    Reflexes     Left Side  Biceps:  2+  Triceps:  2+  Brachioradialis:  2+  Achilles:  2+  Left Pittman's Sign:  Absent  Babinski Sign:  absent  Quadriceps:  2+    Right Side   Biceps:  2+  Triceps:  2+  Brachioradialis:  2+  Achilles:  2+  Right Pittman's Sign:  absent  Babinski Sign:  absent  Quadriceps:  2+    Vascular Exam     Right Pulses        Carotid:                  2+    Left Pulses        Carotid:                  2+          Assessment:     1. Compression fracture of T12 vertebra, sequela    2. Chronic neck pain    3. Chronic midline thoracic back pain         Plan:     Orders Placed This Encounter    Ambulatory referral/consult to Pain Clinic    Ambulatory referral/consult to [facility]     The patient has had a history of neck and upper abck pain with limitations in there activities of Daily living.  Pain is located at T12 and goes up to the neck    Previous treatment has not provided relief.    The situation was discussed at length with the patient.  We discussed different causes of neck pain and different treatment options.  More than 50% of the total time of 45 minutes was spent in counseling.  We discussed the importance of stretching and strengthening.  We discussed posture sitting and standing and we have  to work on all the things we do. .  We discussed the pros and cons of further diagnostic testing, alternative treatment and Medications.  We discussed limiting heating pad.    Based on the history, physical exam, and functional index, an active physical therapy program is recommended.  The goal is to restore the patients function and reduce pain.  A program of progressive resistance exercises, biomechanical, and mobility maneuvers, instructions in proper body mechanics, aerobic conditioning and HEP will be utilized. The program will continue as long as making improvements.    An assessment of patients progress will be made at each visit to document change in status.    The patient will be actively involved in there own treatment, and responsible for appointments and home program    The patient's cervical isometric strength will be tested and they will be placed in a program of isolated strength training based on 50% of their total functional torque and advanced as clinically appropriate.      Directional preference of pain will further influence the patients active rehabilitation program    The patient was instructed there might be an initial increase in discomfort    They are enrolled in cervical program with fair prognosis  Pattern 1    He will also be sent to pain management to consider options for procedures        Follow-up: No follow-ups on file. If there are any questions prior to this, the patient was instructed to contact the office.        1 = assistive equipment

## 2024-06-18 NOTE — PATIENT PROFILE ADULT - DO YOU FEEL LIKE HURTING YOURSELF OR OTHERS?
"Patient information:    Wash your hands with soap and warm water.    GIRLS AND WOMEN    Girls and women need to wash the area between the vagina "lips" (labia). You may be given a special clean-catch kit that contains sterile wipes.    Sit on the toilet with your legs spread apart. Use two fingers to spread open your labia.  Use the first wipe to clean the inner folds of the labia. Wipe from the front to the back.  Use a second wipe to clean over the opening where urine comes out (urethra), just above the opening of the vagina.    To collect the urine sample:    Keeping your labia spread open, begin to urinate into the toilet.  Pass the collection container into your urine stream to catch the "mid-stream" of the urine in the sterile container.    Urinate until the cup is about half full.    You may finish urinating into the toilet bowl.    BOYS AND MEN    Clean the head of the penis with a sterile wipe. If you are not circumcised, you will need to pull back (retract) the foreskin first.    Urinate a small amount into the toilet bowl, and then stop the flow of urine.  Then collect a sample of urine into the clean or sterile cup, until it is half full.  You may finish urinating into the toilet bowl.     Tests to Keep You Healthy    Mammogram: ORDERED BUT NOT SCHEDULED  Cervical Cancer Screening: Met on 10/3/2022      Selvin Miner,     If you are due for any health screening(s) below please notify me so we can arrange them to be ordered and scheduled to maintain your health. Most healthy patients complete it. Don't lose out on improving your health.     Tests to Keep You Healthy    Mammogram: ORDERED BUT NOT SCHEDULED  Cervical Cancer Screening: Met on 10/3/2022      Breast Cancer Screening    Breast cancer is the second most common cancer in women after skin cancer, and the second leading cause of death from cancer after lung cancer. Mammograms can detect breast cancer early, which significantly increases the chances " of curing the cancer.      A screening mammogram is an x-ray image of the breasts used for early breast cancer detection. It can help reduce the number of deaths from breast cancer among women. To get a clear image, the breast is placed between two plastic plates to make it flat. How often a mammogram is needed depends on your age and your breast cancer risk.                         no

## 2024-07-24 NOTE — ED PROVIDER NOTE - OBJECTIVE STATEMENT
Pt is informed:    Please notify patient her US was normal and the IUD is in the appropriate place.  She does have some free fluid in her pelvis which could indicate a previous ruptured ovarian cyst.  This could also explain her pelvic pain.  She should monitor her symptoms and let us know if they worsen    Patient verbalizes understanding. Offers no further questions.     62 y/o man, h/o COPD, oxygen-dependent, pulmonary HTN, CAD, DM, bipolar disorder, c/o shortness of breath x about 4 hours.  He also has mild b/l leg swelling, time course unclear.  No CP/fever/cough.

## 2024-07-30 NOTE — ED ADULT NURSE NOTE - JUGULAR VENOUS DISTENTION
MANIC SYMPTOMS MANIC SYMPTOMS MANIC SYMPTOMS MANIC SYMPTOMS MANIC SYMPTOMS MANIC SYMPTOMS MANIC SYMPTOMS absent

## 2024-09-11 NOTE — H&P ADULT - NSHPLABSRESULTS_GEN_ALL_CORE
WBC: 14.18 (likely due to chronic steroid usage )    BNP: 748, within range for age correction.     Blood gasses (venous):   PH: 7.29  PCO2: 96  HCO3: 46    CXR: No evidence for pleural effusion or pneumothorax.  Pulmonary vascular congestion; underlying pulmonary infiltrate/pneumonia cannot be excluded.  Small left basilar atelectasis. Right basilar atelectasis or pneumonia and possible small right pleural effusion.    Prominence of the right hilum. If clinically indicated, chest CT with IV contrast may be pursued for further evaluation.
none

## 2024-10-10 NOTE — ED ADULT NURSE NOTE - CAS EDP DISCH TYPE
[facility]  Progress Note    Patient Name: Ana Bullard  MRN: [id number]  Patient Class: IP- Inpatient   Admission Date: 10/8/2024  Length of Stay: 2 days  Attending Physician: Lynn Barkley MD  Primary Care Provider: Michelle Messina FNP        Subjective:     Principal Problem:Bacteremia due to Enterococcus        HPI:  Ana Bullard is a 64 y.o. male who has a history of  extensive cardiac issues including CABG stents, now status post AICD, CHF, DVT, diabetes, anemia, hyperlipidemia, FARHAN, hypothyroidism,  and presents with malaise.     Patient was brought in by his wife.  Patient was fairly lethargic and unable to answer questions initially on exam on re-evaluation patient states that he was not not feeling well over the past few days and feeling fatigue and tired and also noticed that he had a fever.  He had no known other reasons for fever, no sore throat no sick contacts no shortness a breath, no coughing.  No burning with urinating no diarrhea no vomiting.  Patient is febrile here in the ER as well.  Patient given fluids IV antibiotics and showed signs of improvement.  Lactic acid was negative.  No clear focal points no signs or symptoms as of yet.  Blood cultures pending.  Patient will continue on IV antibiotics.  Especially now to evaluate whether the patient may have infected hardware    Overview/Hospital Course:  No notes on file    Interval History:  Patient was seen and examined this morning.  He states that he is feeling generally much better than yesterday and the day prior.  He denies any subjective fever or rigors.  Denies any chest pain or dyspnea.    Review of Systems   Constitutional:  Positive for activity change and fatigue. Negative for appetite change, chills and fever.   HENT:  Negative for congestion.    Respiratory:  Negative for cough, chest tightness, shortness of breath and wheezing.    Cardiovascular:  Negative for chest pain,  palpitations and leg swelling.   Gastrointestinal:  Negative for abdominal distention, abdominal pain and diarrhea.   Genitourinary:  Negative for dysuria.   Neurological:  Negative for headaches.   Psychiatric/Behavioral:  Negative for agitation and confusion.      Objective:     Vital Signs (Most Recent):  Temp: 98 °F (36.7 °C) (10/09/24 1440)  Pulse: 93 (10/09/24 1600)  Resp: 18 (10/09/24 1440)  BP: 107/61 (10/09/24 1600)  SpO2: 97 % (10/09/24 1440) Vital Signs (24h Range):  Temp:  [97.4 °F (36.3 °C)-103.3 °F (39.6 °C)] 98 °F (36.7 °C)  Pulse:  [72-94] 93  Resp:  [16-23] 18  SpO2:  [90 %-99 %] 97 %  BP: ()/(53-74) 107/61     Weight: 120 kg (264 lb 8.8 oz)  Body mass index is 34.9 kg/m².    Intake/Output Summary (Last 24 hours) at 10/9/2024 1639  Last data filed at 10/9/2024 1554  Gross per 24 hour   Intake 960 ml   Output 1400 ml   Net -440 ml         Physical Exam  Constitutional:       General: He is not in acute distress.     Appearance: He is not toxic-appearing or diaphoretic.   HENT:      Head: Normocephalic and atraumatic.      Nose: No congestion or rhinorrhea.   Eyes:      Extraocular Movements: Extraocular movements intact.   Cardiovascular:      Rate and Rhythm: Normal rate.   Abdominal:      Palpations: Abdomen is soft.      Tenderness: There is no abdominal tenderness. There is no guarding or rebound.   Musculoskeletal:      Right lower leg: No edema.      Left lower leg: No edema.   Skin:     General: Skin is warm and dry.      Comments: Stasis dermatitis   Neurological:      General: No focal deficit present.      Mental Status: He is alert and oriented to person, place, and time.   Psychiatric:         Mood and Affect: Mood normal.         Behavior: Behavior normal.             Significant Labs: All pertinent labs within the past 24 hours have been reviewed.  BMP:   Recent Labs   Lab 10/09/24  0655   *   *   K 5.3*   CL 97   CO2 30*   BUN 43*   CREATININE 4.0*   CALCIUM 8.9   MG  1.8     CBC:   Recent Labs   Lab 10/08/24  1746 10/09/24  0655   WBC 9.44 6.63   HGB 10.8* 9.9*   HCT 34.2* 32.0*   PLT 76* 56*       Significant Imaging: I have reviewed all pertinent imaging results/findings within the past 24 hours.  I have reviewed and interpreted all pertinent imaging results/findings within the past 24 hours.    Assessment/Plan:      * Bacteremia due to Enterococcus  Source unclear, patient not requiring any pressor support but did get IV fluid resuscitated  Infectious Disease consult is placed, transitioned to ceftriaxone ampicillin      Patient with no wounds or sores, only for an implant is a pacemaker and ICD, no additional hardware  No jaw pain but poor dentition    CRP noted to be elevated, TTE negative for vegetations    Periorbital hematoma of left eye  Patient with recent removal of left basal cell carcinoma lesion, likely adjacent left eye, patient states that he had a black eye after this procedure, no trauma to the area    ESRD (end stage renal disease)  Nephrology on board, managing hemodialysis    Chronic combined systolic and diastolic heart failure  Given ESRD patient was not on ACE or Arb, reinitiate home carvedilol    Patient has AICD in place      CAD (coronary artery disease)  Continue aspirin and statin    Type II diabetes mellitus with neurological manifestations  Well controlled on home oral meds, A1c 6.3    So far has not her correction but can continue low-dose sliding scale insulin        VTE Risk Mitigation (From admission, onward)           Ordered     IP VTE HIGH RISK PATIENT  Once         10/08/24 2224     Place sequential compression device  Until discontinued         10/08/24 2224     Reason for No Pharmacological VTE Prophylaxis  Once        Question:  Reasons:  Answer:  Physician Provided (leave comment)  Comment:  poor response    10/08/24 2224                    Discharge Planning   BILL: 10/11/2024     Code Status: Full Code   Is the patient medically ready  for discharge?:     Reason for patient still in hospital (select all that apply): Treatment  Discharge Plan A: Home                  Lynn Barkley MD  Department of Hospital Medicine   [facility]     Assisted living facility

## 2024-10-23 NOTE — PHYSICAL THERAPY INITIAL EVALUATION ADULT - WORK/LEISURE ACTIVITY, REHAB EVAL
[Home] : at home, [unfilled] , at the time of the visit. [Medical Office: ([facility])___] : at the medical office located in  [Verbal consent obtained from patient] : the patient, [unfilled] [New Patient Visit] : a new patient visit independent

## 2024-12-12 NOTE — H&P ADULT - NSHPPHYSICALEXAM_GEN_ALL_CORE
PHYSICAL EXAM:  GENERAL: intubated and sedated  HEAD:  Atraumatic, Normocephalic  EYES: Pupils constricted and non reactive   NECK: Supple  CHEST/LUNG: Course lung examination noted in bilateral lung fields   HEART: Regular rate and rhythm; No murmurs; gallops or rubs    ABDOMEN: Soft, tender and distended; Bowel sounds present; No guarding  : Tejada catheter placed and immediately draining over 1.5L pf pyuria   EXTREMITIES:  2+ Peripheral Pulses, No cyanosis or edema  PSYCH: unable to assess   NEUROLOGY: unable to assess   SKIN: No rashes or lesions no

## 2024-12-19 NOTE — DISCHARGE NOTE NURSING/CASE MANAGEMENT/SOCIAL WORK - NSDCPEWEB_GEN_ALL_CORE
[facility] website --- [email or web address] --- [email or web address]
Principal Discharge DX:	Runny nose  
1
Home

## 2025-02-01 NOTE — PATIENT PROFILE ADULT - FALL HARM RISK - FALL HARM RISK
Prep Survey      Flowsheet Row Responses   [facility] facility patient discharged from? [facility]   Is LACE score < 7 ? Yes   Eligibility [facility]   Date of Admission 01/30/25   Date of Discharge 01/31/25   Discharge Disposition Home or Self Care   Discharge diagnosis Abdominal pain   Does the patient have one of the following disease processes/diagnoses(primary or secondary)? Other   Does the patient have Home health ordered? No   Is there a DME ordered? No   Prep survey completed? Yes            PALLAVI KAPOOR - Registered Nurse           Other

## 2025-02-25 NOTE — ED PROVIDER NOTE - EKG #1 DATE/TIME
Follow up with PCP. We advised seeking immediate emergency medical attention if symptoms fail to improve, worsen or any concerning symptoms arise. Patient voiced full understanding and agreement to plan.    We discussed with the patient our clinical thoughts at this time given the above findings and clinical assessment and we had a shared decision-making conversation in a patient-centered decision-making model on how to proceed forward. The patient was instructed on the importance of a close follow-up with PCP and other care providers. The patient was also advised that an Urgent care diagnosis is often a preliminary impression and that definitive care is often not able to be given completley in the Urgent care setting.       24-Jul-2021 16:57

## 2025-03-15 NOTE — PROGRESS NOTE ADULT - PROBLEM SELECTOR PROBLEM 7
Andria Hale is a 27 year old female presenting to the walk-in clinic today for cough, sore throat, sweats/chills and ear pain. Symptoms started 12/11; seen on 12/12 and dx with strep. States today it mostly is the ears.  Needs work excuse  Swabs/Specimens collected during rooming process:    None        Patient would like communication of their results via:   [facility]   Dr. Trivedi said you may start your Plavix and Eliquis on Tuesday!   Coronary artery disease involving native coronary artery of native heart without angina pectoris

## 2025-03-29 ENCOUNTER — INPATIENT (INPATIENT)
Facility: HOSPITAL | Age: 65
LOS: 10 days | Discharge: NURSING, REHAB OR LONG-TERM CARE | DRG: 192 | End: 2025-04-09
Attending: STUDENT IN AN ORGANIZED HEALTH CARE EDUCATION/TRAINING PROGRAM | Admitting: STUDENT IN AN ORGANIZED HEALTH CARE EDUCATION/TRAINING PROGRAM
Payer: MEDICAID

## 2025-03-29 VITALS
SYSTOLIC BLOOD PRESSURE: 126 MMHG | RESPIRATION RATE: 20 BRPM | HEART RATE: 121 BPM | OXYGEN SATURATION: 97 % | TEMPERATURE: 98 F | DIASTOLIC BLOOD PRESSURE: 72 MMHG

## 2025-03-29 DIAGNOSIS — J44.1 CHRONIC OBSTRUCTIVE PULMONARY DISEASE WITH (ACUTE) EXACERBATION: ICD-10-CM

## 2025-03-29 PROBLEM — I10 ESSENTIAL (PRIMARY) HYPERTENSION: Chronic | Status: ACTIVE | Noted: 2024-01-20

## 2025-03-29 PROBLEM — J44.9 CHRONIC OBSTRUCTIVE PULMONARY DISEASE, UNSPECIFIED: Chronic | Status: ACTIVE | Noted: 2024-01-20

## 2025-03-29 PROBLEM — G47.33 OBSTRUCTIVE SLEEP APNEA (ADULT) (PEDIATRIC): Chronic | Status: ACTIVE | Noted: 2024-01-20

## 2025-03-29 PROBLEM — I25.10 ATHEROSCLEROTIC HEART DISEASE OF NATIVE CORONARY ARTERY WITHOUT ANGINA PECTORIS: Chronic | Status: ACTIVE | Noted: 2024-01-20

## 2025-03-29 PROBLEM — E11.9 TYPE 2 DIABETES MELLITUS WITHOUT COMPLICATIONS: Chronic | Status: ACTIVE | Noted: 2024-01-20

## 2025-03-29 PROBLEM — F31.9 BIPOLAR DISORDER, UNSPECIFIED: Chronic | Status: ACTIVE | Noted: 2024-01-20

## 2025-03-29 LAB
ALBUMIN SERPL ELPH-MCNC: 3.1 G/DL — LOW (ref 3.5–5)
ALBUMIN SERPL ELPH-MCNC: 3.5 G/DL — SIGNIFICANT CHANGE UP (ref 3.5–5)
ALP SERPL-CCNC: 102 U/L — SIGNIFICANT CHANGE UP (ref 40–120)
ALP SERPL-CCNC: 116 U/L — SIGNIFICANT CHANGE UP (ref 40–120)
ALT FLD-CCNC: 26 U/L DA — SIGNIFICANT CHANGE UP (ref 10–60)
ALT FLD-CCNC: 29 U/L DA — SIGNIFICANT CHANGE UP (ref 10–60)
ANION GAP SERPL CALC-SCNC: 3 MMOL/L — LOW (ref 5–17)
ANION GAP SERPL CALC-SCNC: 6 MMOL/L — SIGNIFICANT CHANGE UP (ref 5–17)
APPEARANCE UR: ABNORMAL
APTT BLD: 31.7 SEC — SIGNIFICANT CHANGE UP (ref 24.5–35.6)
AST SERPL-CCNC: 17 U/L — SIGNIFICANT CHANGE UP (ref 10–40)
AST SERPL-CCNC: 18 U/L — SIGNIFICANT CHANGE UP (ref 10–40)
BASE EXCESS BLDA CALC-SCNC: 4.4 MMOL/L — HIGH (ref -2–3)
BASE EXCESS BLDA CALC-SCNC: 4.6 MMOL/L — HIGH (ref -2–3)
BASE EXCESS BLDA CALC-SCNC: 9.2 MMOL/L — HIGH (ref -2–3)
BASOPHILS # BLD AUTO: 0.06 K/UL — SIGNIFICANT CHANGE UP (ref 0–0.2)
BASOPHILS NFR BLD AUTO: 0.4 % — SIGNIFICANT CHANGE UP (ref 0–2)
BILIRUB SERPL-MCNC: 0.2 MG/DL — SIGNIFICANT CHANGE UP (ref 0.2–1.2)
BILIRUB SERPL-MCNC: 0.3 MG/DL — SIGNIFICANT CHANGE UP (ref 0.2–1.2)
BILIRUB UR-MCNC: NEGATIVE — SIGNIFICANT CHANGE UP
BLOOD GAS COMMENTS ARTERIAL: SIGNIFICANT CHANGE UP
BUN SERPL-MCNC: 35 MG/DL — HIGH (ref 7–18)
BUN SERPL-MCNC: 38 MG/DL — HIGH (ref 7–18)
CALCIUM SERPL-MCNC: 9.1 MG/DL — SIGNIFICANT CHANGE UP (ref 8.4–10.5)
CALCIUM SERPL-MCNC: 9.8 MG/DL — SIGNIFICANT CHANGE UP (ref 8.4–10.5)
CHLORIDE SERPL-SCNC: 94 MMOL/L — LOW (ref 96–108)
CHLORIDE SERPL-SCNC: 95 MMOL/L — LOW (ref 96–108)
CO2 SERPL-SCNC: 34 MMOL/L — HIGH (ref 22–31)
CO2 SERPL-SCNC: 34 MMOL/L — HIGH (ref 22–31)
COLOR SPEC: YELLOW — SIGNIFICANT CHANGE UP
CREAT SERPL-MCNC: 1.22 MG/DL — SIGNIFICANT CHANGE UP (ref 0.5–1.3)
CREAT SERPL-MCNC: 1.4 MG/DL — HIGH (ref 0.5–1.3)
DIFF PNL FLD: ABNORMAL
EGFR: 57 ML/MIN/1.73M2 — LOW
EGFR: 57 ML/MIN/1.73M2 — LOW
EGFR: 67 ML/MIN/1.73M2 — SIGNIFICANT CHANGE UP
EGFR: 67 ML/MIN/1.73M2 — SIGNIFICANT CHANGE UP
EOSINOPHIL # BLD AUTO: 0.01 K/UL — SIGNIFICANT CHANGE UP (ref 0–0.5)
EOSINOPHIL NFR BLD AUTO: 0.1 % — SIGNIFICANT CHANGE UP (ref 0–6)
FLUAV AG NPH QL: SIGNIFICANT CHANGE UP
FLUBV AG NPH QL: SIGNIFICANT CHANGE UP
GAS PNL BLDA: SIGNIFICANT CHANGE UP
GLUCOSE BLDC GLUCOMTR-MCNC: 336 MG/DL — HIGH (ref 70–99)
GLUCOSE SERPL-MCNC: 289 MG/DL — HIGH (ref 70–99)
GLUCOSE SERPL-MCNC: 300 MG/DL — HIGH (ref 70–99)
GLUCOSE UR QL: NEGATIVE MG/DL — SIGNIFICANT CHANGE UP
HCO3 BLDA-SCNC: 35 MMOL/L — HIGH (ref 21–28)
HCO3 BLDA-SCNC: 36 MMOL/L — HIGH (ref 21–28)
HCO3 BLDA-SCNC: 37 MMOL/L — HIGH (ref 21–28)
HCT VFR BLD CALC: 40.3 % — SIGNIFICANT CHANGE UP (ref 39–50)
HCT VFR BLD CALC: 44.3 % — SIGNIFICANT CHANGE UP (ref 39–50)
HGB BLD-MCNC: 12.3 G/DL — LOW (ref 13–17)
HGB BLD-MCNC: 13.7 G/DL — SIGNIFICANT CHANGE UP (ref 13–17)
HOROWITZ INDEX BLDA+IHG-RTO: 0.5 — SIGNIFICANT CHANGE UP
HOROWITZ INDEX BLDA+IHG-RTO: 100 — SIGNIFICANT CHANGE UP
HOROWITZ INDEX BLDA+IHG-RTO: 40 — SIGNIFICANT CHANGE UP
IMM GRANULOCYTES NFR BLD AUTO: 0.7 % — SIGNIFICANT CHANGE UP (ref 0–0.9)
INR BLD: 1.1 RATIO — SIGNIFICANT CHANGE UP (ref 0.85–1.16)
KETONES UR-MCNC: ABNORMAL MG/DL
LACTATE SERPL-SCNC: 1 MMOL/L — SIGNIFICANT CHANGE UP (ref 0.7–2)
LEUKOCYTE ESTERASE UR-ACNC: ABNORMAL
LYMPHOCYTES # BLD AUTO: 1.26 K/UL — SIGNIFICANT CHANGE UP (ref 1–3.3)
LYMPHOCYTES # BLD AUTO: 8.2 % — LOW (ref 13–44)
MAGNESIUM SERPL-MCNC: 2.2 MG/DL — SIGNIFICANT CHANGE UP (ref 1.6–2.6)
MCHC RBC-ENTMCNC: 26.7 PG — LOW (ref 27–34)
MCHC RBC-ENTMCNC: 27.2 PG — SIGNIFICANT CHANGE UP (ref 27–34)
MCHC RBC-ENTMCNC: 30.5 G/DL — LOW (ref 32–36)
MCHC RBC-ENTMCNC: 30.9 G/DL — LOW (ref 32–36)
MCV RBC AUTO: 87.6 FL — SIGNIFICANT CHANGE UP (ref 80–100)
MCV RBC AUTO: 88.1 FL — SIGNIFICANT CHANGE UP (ref 80–100)
MONOCYTES # BLD AUTO: 1.48 K/UL — HIGH (ref 0–0.9)
MONOCYTES NFR BLD AUTO: 9.6 % — SIGNIFICANT CHANGE UP (ref 2–14)
NEUTROPHILS # BLD AUTO: 12.5 K/UL — HIGH (ref 1.8–7.4)
NEUTROPHILS NFR BLD AUTO: 81 % — HIGH (ref 43–77)
NITRITE UR-MCNC: NEGATIVE — SIGNIFICANT CHANGE UP
NRBC BLD AUTO-RTO: 0 /100 WBCS — SIGNIFICANT CHANGE UP (ref 0–0)
NRBC BLD AUTO-RTO: 0 /100 WBCS — SIGNIFICANT CHANGE UP (ref 0–0)
NT-PROBNP SERPL-SCNC: HIGH PG/ML (ref 0–125)
PCO2 BLDA: 66 MMHG — HIGH (ref 35–48)
PCO2 BLDA: 83 MMHG — CRITICAL HIGH (ref 35–48)
PCO2 BLDA: 89 MMHG — CRITICAL HIGH (ref 35–48)
PH BLDA: 7.21 — LOW (ref 7.35–7.45)
PH BLDA: 7.23 — LOW (ref 7.35–7.45)
PH BLDA: 7.36 — SIGNIFICANT CHANGE UP (ref 7.35–7.45)
PH UR: 5.5 — SIGNIFICANT CHANGE UP (ref 5–8)
PHOSPHATE SERPL-MCNC: 5.3 MG/DL — HIGH (ref 2.5–4.5)
PLATELET # BLD AUTO: 210 K/UL — SIGNIFICANT CHANGE UP (ref 150–400)
PLATELET # BLD AUTO: 239 K/UL — SIGNIFICANT CHANGE UP (ref 150–400)
PO2 BLDA: 53 MMHG — LOW (ref 83–108)
PO2 BLDA: 66 MMHG — LOW (ref 83–108)
PO2 BLDA: 80 MMHG — LOW (ref 83–108)
POTASSIUM SERPL-MCNC: 5 MMOL/L — SIGNIFICANT CHANGE UP (ref 3.5–5.3)
POTASSIUM SERPL-MCNC: 5 MMOL/L — SIGNIFICANT CHANGE UP (ref 3.5–5.3)
POTASSIUM SERPL-SCNC: 5 MMOL/L — SIGNIFICANT CHANGE UP (ref 3.5–5.3)
POTASSIUM SERPL-SCNC: 5 MMOL/L — SIGNIFICANT CHANGE UP (ref 3.5–5.3)
PROT SERPL-MCNC: 8.3 G/DL — SIGNIFICANT CHANGE UP (ref 6–8.3)
PROT SERPL-MCNC: 9.1 G/DL — HIGH (ref 6–8.3)
PROT UR-MCNC: 100 MG/DL
PROTHROM AB SERPL-ACNC: 12.7 SEC — SIGNIFICANT CHANGE UP (ref 9.9–13.4)
RBC # BLD: 4.6 M/UL — SIGNIFICANT CHANGE UP (ref 4.2–5.8)
RBC # BLD: 5.03 M/UL — SIGNIFICANT CHANGE UP (ref 4.2–5.8)
RBC # FLD: 13.5 % — SIGNIFICANT CHANGE UP (ref 10.3–14.5)
RBC # FLD: 13.6 % — SIGNIFICANT CHANGE UP (ref 10.3–14.5)
RSV RNA NPH QL NAA+NON-PROBE: SIGNIFICANT CHANGE UP
SAO2 % BLDA: 81 % — SIGNIFICANT CHANGE UP
SAO2 % BLDA: 89 % — SIGNIFICANT CHANGE UP
SAO2 % BLDA: 97 % — SIGNIFICANT CHANGE UP
SARS-COV-2 RNA SPEC QL NAA+PROBE: SIGNIFICANT CHANGE UP
SODIUM SERPL-SCNC: 131 MMOL/L — LOW (ref 135–145)
SODIUM SERPL-SCNC: 135 MMOL/L — SIGNIFICANT CHANGE UP (ref 135–145)
SOURCE RESPIRATORY: SIGNIFICANT CHANGE UP
SP GR SPEC: 1.01 — SIGNIFICANT CHANGE UP (ref 1–1.03)
TROPONIN I, HIGH SENSITIVITY RESULT: 101.6 NG/L — HIGH
TROPONIN I, HIGH SENSITIVITY RESULT: 145.5 NG/L — HIGH
UROBILINOGEN FLD QL: 0.2 MG/DL — SIGNIFICANT CHANGE UP (ref 0.2–1)
WBC # BLD: 13.97 K/UL — HIGH (ref 3.8–10.5)
WBC # BLD: 15.42 K/UL — HIGH (ref 3.8–10.5)
WBC # FLD AUTO: 13.97 K/UL — HIGH (ref 3.8–10.5)
WBC # FLD AUTO: 15.42 K/UL — HIGH (ref 3.8–10.5)

## 2025-03-29 PROCEDURE — 99291 CRITICAL CARE FIRST HOUR: CPT

## 2025-03-29 PROCEDURE — 93010 ELECTROCARDIOGRAM REPORT: CPT | Mod: 76

## 2025-03-29 PROCEDURE — 71045 X-RAY EXAM CHEST 1 VIEW: CPT | Mod: 26

## 2025-03-29 RX ORDER — NITROGLYCERIN 20 MG/G
0.4 OINTMENT TOPICAL ONCE
Refills: 0 | Status: COMPLETED | OUTPATIENT
Start: 2025-03-29 | End: 2025-03-29

## 2025-03-29 RX ORDER — HYPROMELLOSE 0.4 %
1 DROPS OPHTHALMIC (EYE)
Refills: 0 | Status: DISCONTINUED | OUTPATIENT
Start: 2025-03-29 | End: 2025-04-09

## 2025-03-29 RX ORDER — LATANOPROST PF 0.05 MG/ML
1 SOLUTION/ DROPS OPHTHALMIC AT BEDTIME
Refills: 0 | Status: DISCONTINUED | OUTPATIENT
Start: 2025-03-29 | End: 2025-04-09

## 2025-03-29 RX ORDER — CEFTRIAXONE 500 MG/1
1000 INJECTION, POWDER, FOR SOLUTION INTRAMUSCULAR; INTRAVENOUS EVERY 24 HOURS
Refills: 0 | Status: DISCONTINUED | OUTPATIENT
Start: 2025-03-30 | End: 2025-03-31

## 2025-03-29 RX ORDER — IPRATROPIUM BROMIDE AND ALBUTEROL SULFATE .5; 2.5 MG/3ML; MG/3ML
3 SOLUTION RESPIRATORY (INHALATION) EVERY 6 HOURS
Refills: 0 | Status: DISCONTINUED | OUTPATIENT
Start: 2025-03-29 | End: 2025-04-03

## 2025-03-29 RX ORDER — FUROSEMIDE 10 MG/ML
80 INJECTION INTRAMUSCULAR; INTRAVENOUS ONCE
Refills: 0 | Status: COMPLETED | OUTPATIENT
Start: 2025-03-29 | End: 2025-03-29

## 2025-03-29 RX ORDER — INSULIN LISPRO 100 U/ML
INJECTION, SOLUTION INTRAVENOUS; SUBCUTANEOUS EVERY 6 HOURS
Refills: 0 | Status: DISCONTINUED | OUTPATIENT
Start: 2025-03-29 | End: 2025-03-30

## 2025-03-29 RX ORDER — HYPROMELLOSE 0.4 %
1 DROPS OPHTHALMIC (EYE)
Refills: 0 | DISCHARGE

## 2025-03-29 RX ORDER — IPRATROPIUM BROMIDE AND ALBUTEROL SULFATE .5; 2.5 MG/3ML; MG/3ML
3 SOLUTION RESPIRATORY (INHALATION)
Refills: 0 | Status: DISCONTINUED | OUTPATIENT
Start: 2025-03-29 | End: 2025-04-01

## 2025-03-29 RX ORDER — METHYLPREDNISOLONE ACETATE 80 MG/ML
40 INJECTION, SUSPENSION INTRA-ARTICULAR; INTRALESIONAL; INTRAMUSCULAR; SOFT TISSUE EVERY 12 HOURS
Refills: 0 | Status: DISCONTINUED | OUTPATIENT
Start: 2025-03-29 | End: 2025-04-01

## 2025-03-29 RX ORDER — METHYLPREDNISOLONE ACETATE 80 MG/ML
40 INJECTION, SUSPENSION INTRA-ARTICULAR; INTRALESIONAL; INTRAMUSCULAR; SOFT TISSUE EVERY 8 HOURS
Refills: 0 | Status: DISCONTINUED | OUTPATIENT
Start: 2025-03-29 | End: 2025-03-29

## 2025-03-29 RX ORDER — DEXTROMETHORPHAN HBR, GUAIFENESIN 20; 200 MG/10ML; MG/10ML
10 SOLUTION ORAL
Refills: 0 | DISCHARGE

## 2025-03-29 RX ORDER — ATORVASTATIN CALCIUM 80 MG/1
1 TABLET, FILM COATED ORAL
Refills: 0 | DISCHARGE

## 2025-03-29 RX ORDER — SODIUM BICARBONATE 1 MEQ/ML
50 SYRINGE (ML) INTRAVENOUS ONCE
Refills: 0 | Status: COMPLETED | OUTPATIENT
Start: 2025-03-29 | End: 2025-03-29

## 2025-03-29 RX ORDER — RISPERIDONE 4 MG
1 TABLET ORAL
Refills: 0 | DISCHARGE

## 2025-03-29 RX ORDER — CEFTRIAXONE 500 MG/1
INJECTION, POWDER, FOR SOLUTION INTRAMUSCULAR; INTRAVENOUS
Refills: 0 | Status: DISCONTINUED | OUTPATIENT
Start: 2025-03-29 | End: 2025-03-31

## 2025-03-29 RX ORDER — CEFTRIAXONE 500 MG/1
1000 INJECTION, POWDER, FOR SOLUTION INTRAMUSCULAR; INTRAVENOUS ONCE
Refills: 0 | Status: COMPLETED | OUTPATIENT
Start: 2025-03-29 | End: 2025-03-29

## 2025-03-29 RX ORDER — GUAIFENESIN/PHENYLEPHRINE HCL 1200-25MG
0 TABLET, EXTENDED RELEASE 12 HR ORAL
Refills: 0 | DISCHARGE

## 2025-03-29 RX ORDER — METHYLPREDNISOLONE ACETATE 80 MG/ML
125 INJECTION, SUSPENSION INTRA-ARTICULAR; INTRALESIONAL; INTRAMUSCULAR; SOFT TISSUE ONCE
Refills: 0 | Status: COMPLETED | OUTPATIENT
Start: 2025-03-29 | End: 2025-03-29

## 2025-03-29 RX ORDER — INSULIN GLARGINE-YFGN 100 [IU]/ML
30 INJECTION, SOLUTION SUBCUTANEOUS AT BEDTIME
Refills: 0 | Status: DISCONTINUED | OUTPATIENT
Start: 2025-03-29 | End: 2025-03-30

## 2025-03-29 RX ADMIN — INSULIN GLARGINE-YFGN 30 UNIT(S): 100 INJECTION, SOLUTION SUBCUTANEOUS at 23:06

## 2025-03-29 RX ADMIN — METHYLPREDNISOLONE ACETATE 40 MILLIGRAM(S): 80 INJECTION, SUSPENSION INTRA-ARTICULAR; INTRALESIONAL; INTRAMUSCULAR; SOFT TISSUE at 18:04

## 2025-03-29 RX ADMIN — Medication 1 DROP(S): at 20:11

## 2025-03-29 RX ADMIN — Medication 50 MILLIEQUIVALENT(S): at 14:24

## 2025-03-29 RX ADMIN — FUROSEMIDE 80 MILLIGRAM(S): 10 INJECTION INTRAMUSCULAR; INTRAVENOUS at 14:23

## 2025-03-29 RX ADMIN — METHYLPREDNISOLONE ACETATE 125 MILLIGRAM(S): 80 INJECTION, SUSPENSION INTRA-ARTICULAR; INTRALESIONAL; INTRAMUSCULAR; SOFT TISSUE at 14:13

## 2025-03-29 RX ADMIN — CEFTRIAXONE 100 MILLIGRAM(S): 500 INJECTION, POWDER, FOR SOLUTION INTRAMUSCULAR; INTRAVENOUS at 20:11

## 2025-03-29 RX ADMIN — NITROGLYCERIN 0.4 MILLIGRAM(S): 20 OINTMENT TOPICAL at 14:44

## 2025-03-29 RX ADMIN — LATANOPROST PF 1 DROP(S): 0.05 SOLUTION/ DROPS OPHTHALMIC at 23:06

## 2025-03-29 RX ADMIN — Medication 1 DOSE(S): at 23:06

## 2025-03-29 RX ADMIN — INSULIN LISPRO 6: 100 INJECTION, SOLUTION INTRAVENOUS; SUBCUTANEOUS at 18:02

## 2025-03-29 RX ADMIN — IPRATROPIUM BROMIDE AND ALBUTEROL SULFATE 3 MILLILITER(S): .5; 2.5 SOLUTION RESPIRATORY (INHALATION) at 20:14

## 2025-03-29 NOTE — ED PROVIDER NOTE - OBJECTIVE STATEMENT
Patient is a 64-year-old gentleman with a past medical history of hypertension, hyperlipidemia, pulmonary hypertension, CHF, CAD, COPD on 3 L O2 with multiple intubations in the past, bipolar disorder who presents to the ED because of respiratory distress.  Patient coming from nursing home for difficulty breathing.  Patient unable to provide history upon arrival, patient drowsy,  He is arousable, protecting his airway.

## 2025-03-29 NOTE — ED PROVIDER NOTE - CLINICAL SUMMARY MEDICAL DECISION MAKING FREE TEXT BOX
Ddx: Respiratory failure, COPD vs CHF/ ro underlying infection/ acs  Plan: Cbc, cmp, troponin, bnp, ecg, cxr, cultures, bipap, abg, possible intubation/ icu

## 2025-03-29 NOTE — PATIENT PROFILE ADULT - FALL HARM RISK - RISK INTERVENTIONS
Assistance OOB with selected safe patient handling equipment/Communicate Fall Risk and Risk Factors to all staff, patient, and family/Discuss with provider need for PT consult/Monitor gait and stability/Orthostatic vital signs/Provide patient with walking aids - walker, cane, crutches/Reinforce activity limits and safety measures with patient and family/Visual Cue: Yellow wristband/Bed in lowest position, wheels locked, appropriate side rails in place/Call bell, personal items and telephone in reach/Instruct patient to call for assistance before getting out of bed or chair/Non-slip footwear when patient is out of bed/[place] to call system/Physically safe environment - no spills, clutter or unnecessary equipment/Purposeful Proactive Rounding/Room/bathroom lighting operational, light cord in reach

## 2025-03-29 NOTE — ED ADULT NURSE NOTE - HIV OFFER
Care Everywhere:   Immunization: no profile in links  Health Maintenance:   Media Review:   Legacy Review:   Order placed:   Upcoming appts:           Unable to answer due to medical condition/unresponsive/etc...

## 2025-03-29 NOTE — H&P ADULT - NSHPPHYSICALEXAM_GEN_ALL_CORE
T(C): 36.8 (03-29-25 @ 11:57), Max: 36.8 (03-29-25 @ 11:57)  HR: 118 (03-29-25 @ 12:15) (118 - 121)  BP: 126/72 (03-29-25 @ 11:12) (126/72 - 126/72)  RR: 20 (03-29-25 @ 11:12) (20 - 20)  SpO2: 96% (03-29-25 @ 12:30) (94% - 97%)    CONSTITUTIONAL: Well groomed, no apparent distress  EYES: PERRLA and symmetric, EOMI, No conjunctival or scleral injection, non-icteric  ENMT: Oral mucosa with moist membranes.   RESP: No respiratory distress, no use of accessory muscles; CTA b/l, no WRR  CV: RRR, +S1S2, no MRG; no JVD; no peripheral edema  GI: Soft, NT, (+) distended, no rebound, no guarding; no palpable masses; no hepatosplenomegaly; no hernia palpated  MSK:   Normal ROM without pain, no spinal tenderness, normal muscle strength/tone  SKIN: No rashes or ulcers noted; no subcutaneous nodules or induration palpable  NEURO: CN II-XII grossly intact; follows commands

## 2025-03-29 NOTE — H&P ADULT - ASSESSMENT
Pt is a 64M from [facility], w/ PMHx of CAD, CHF, DM, CYNTHIA on nocturnal bipap, COPD (multiple intubations in past on 3L of home O2), peripheral neuropathy, GERD, HTN, HLD, obesity, polyarthritis, Pulmonary HTN, TIA, p/w respiratory distress from NH. Placed on BiPAP, s/p lasix 80, solumedrol 125 . Admitted for AHRF 2/2 COPD vs. CHF exacerbation.     #AHRF 2/2 COPD exacerbation vs. CHF exacerbation  #COPD  #HFpEF   #CAD  #HTN  #HLD  #CYNTHIA on nocturnal bipap    _________CNS___________  no active issues     _________CVS___________  #HFpEF   - not clinically overloaded in the ED, POCUS a-line predominant, grossly preserved systolic fxn   - TTE from 10/2023- EF 50-55%, G1DD    - proBNP 11K  - Trop 145   - EKG: sinus tachycardia, RBBB (compared to prior)  CXR shows no consolidation, congestion, effusions pending official read   - s/p lasix 80 x1   - f/u repeat trop  - f/u TTE     #CAD  - c/w asa, statin     #HTN  - not on anti-hypertensives  - continue to monitor    #HLD   - c/w statin     _________ RESP__________  #AHRF 2/2 COPD exacerbation vs. CHF exacerbation  #COPD  #CYNTHIA  #HFpEF   - s/p lasix 80 and solumedrol 125   - c/w BiPAP  - c/w solumedrol 40q8  - duoneb x3 doses stat, then q6 RTC  - start advair   - start lasix 40qd   - f/u repeat ABG after a few hours on BiPAP  - f/u repeat trop  - f/u TTE     __________GI____________  #Nutrition  NPO on BiPAP    ________ RENAL__________  #RADHIKA  - baseline .66 in 2024, now 1.4  - likely pre-renal in nature   - montior Cr post diuresis, strict I&O    #Respiratory Acidosis   - ABG 7.21/89/53/36 > 7.23/83/66/35     _________MSK___________  no active issues     __________ID____________  #SIRS   - pt met SIRS criteria with tachycardia and WC 15   - no obvious source of infection, afebrile, hemodynamically stable   - lactate 1   - monitor off bax  - f/u u/a, ucx if positive  - f/u bcx     _________ENDO__________  #T2IDDM  - on novlog mix 70/30 bid at home, 58u in the AM, 46u in the evening + novolin R sliding scale   - start lantus 30 u at bedtime (decreased dose given NPO status, mod ISSq6 while NPO   - last a1c 9.4, 1/2024, f/u a1c    _______HEME/ONC_______  no active issues     _________SKIN____________  no active issues   Peripheral IVs     ________Prophylaxis_______  #DVT- heparin subq  #GI- ppi      __________GOC/ DISPO___________  FULL CODE, ICU  Pt is a 64M from [facility], w/ PMHx of CAD, CHF, DM, CYNTHIA on nocturnal bipap, COPD (multiple intubations in past on 3L of home O2), peripheral neuropathy, GERD, HTN, HLD, obesity, polyarthritis, Pulmonary HTN, TIA, BPH, depression/anxiety p/w respiratory distress from NH. Placed on BiPAP, s/p lasix 80, solumedrol 125 . Admitted for AHRF 2/2 COPD vs. CHF exacerbation.     #AHRF 2/2 COPD exacerbation vs. CHF exacerbation  #COPD  #HFpEF   #CAD  #HTN  #HLD  #CYNTHIA on nocturnal bipap  #Pulm HTN  #BPH  #Depression/Anxiety     _________CNS___________  #anxiety/depression  - resume Trazadone, risperidone, alprazolam once off NPO      _________CVS___________  #HFpEF   - not clinically overloaded in the ED, POCUS a-line predominant, grossly preserved systolic fxn   - TTE from 10/2023- EF 50-55%, G1DD    - proBNP 11K  - Trop 145   - EKG: sinus tachycardia, RBBB (compared to prior)  CXR shows no consolidation, congestion, effusions pending official read   - s/p lasix 80 x1   - f/u repeat trop  - f/u TTE     #CAD  - c/w asa, statin once off NPO     #HTN  - not on anti-hypertensives  - continue to monitor    #HLD   - c/w statin once off NPO     _________ RESP__________  #AHRF 2/2 COPD exacerbation vs. CHF exacerbation  #COPD  #CYNTHIA  - s/p lasix 80 and solumedrol 125   - c/w BiPAP  - c/w solumedrol 40q8  - duoneb x3 doses stat, then q6 RTC  - start advair   - f/u repeat ABG after a few hours on BiPAP  - f/u repeat trop  - f/u TTE     __________GI____________  #Nutrition  NPO on BiPAP    ________ RENAL__________  #RADHIKA  - baseline .66 in 2024, now 1.4  - likely pre-renal in nature vs. post-renal obstruction/retention related   - mark placed in ED 3/29  - montior Cr post diuresis, strict I&O    #BPH  - mark in place  - resume Flomax and finasteride once off NPO     #Respiratory Acidosis   - ABG 7.21/89/53/36 > 7.23/83/66/35   - in the setting of AHRF,COPD exacerbation  - f/u repeat ABG after trial of BiPAP     _________MSK___________  no active issues     __________ID____________  #SIRS   - pt met SIRS criteria with tachycardia and WC 15   - no obvious source of infection, afebrile, hemodynamically stable   - lactate 1   - monitor off bax  - f/u u/a, ucx if positive  - f/u bcx     _________ENDO__________  #T2IDDM  - on novlog mix 70/30 bid at home, 58u in the AM, 46u in the evening + novolin R sliding scale   - start lantus 30 u at bedtime (decreased dose given NPO status, mod ISSq6 while NPO   - last a1c 9.4, 1/2024, f/u a1c    _______HEME/ONC_______  no active issues     _________SKIN____________  no active issues   Peripheral IVs     ________Prophylaxis_______  #DVT- heparin subq  #GI- ppi      __________GOC/ DISPO___________  FULL CODE, ICU  Pt is a 64M from [facility], w/ PMHx of CAD, CHF, DM, CYNTHIA on nocturnal bipap, COPD (multiple intubations in past on 3L of home O2), peripheral neuropathy, GERD, HTN, HLD, obesity, polyarthritis, Pulmonary HTN, TIA, BPH, depression/anxiety p/w respiratory distress from NH. Placed on BiPAP, s/p lasix 80, solumedrol 125 . Admitted for AHRF 2/2 COPD vs. CHF exacerbation.     #AHRF 2/2 COPD exacerbation vs. CHF exacerbation  #COPD  #HFpEF   #CAD  #HTN  #HLD  #CYNTHIA on nocturnal bipap  #Pulm HTN  #BPH  #Depression/Anxiety   #GERD    _________CNS___________  #anxiety/depression  - resume Trazadone, risperidone, alprazolam once off NPO      _________CVS___________  #HFpEF   - not clinically overloaded in the ED, POCUS a-line predominant, grossly preserved systolic fxn   - TTE from 10/2023- EF 50-55%, G1DD    - proBNP 11K  - Trop 145   - EKG: sinus tachycardia, RBBB (compared to prior)  CXR shows no consolidation, congestion, effusions pending official read   - s/p lasix 80 x1   - f/u repeat trop  - f/u TTE     #CAD  - c/w asa, statin once off NPO     #HTN  - not on anti-hypertensives  - continue to monitor    #HLD   - c/w statin once off NPO     _________ RESP__________  #AHRF 2/2 COPD exacerbation vs. CHF exacerbation  #COPD  #CYNTHIA  - s/p lasix 80 and solumedrol 125   - c/w BiPAP  - c/w solumedrol 40q8  - duoneb x3 doses stat, then q6 RTC  - start advair   - f/u repeat ABG after a few hours on BiPAP  - f/u repeat trop  - f/u TTE     __________GI____________  #Nutrition  - NPO on BiPAP    #GERD  - PPI     ________ RENAL__________  #RADHKIA  - baseline .66 in 2024, now 1.4  - likely pre-renal in nature vs. post-renal obstruction/retention related   - mark placed in ED 3/29  - montior Cr post diuresis, strict I&O    #BPH  - mark in place  - resume Flomax and finasteride once off NPO     #Respiratory Acidosis   - ABG 7.21/89/53/36 > 7.23/83/66/35   - in the setting of AHRF,COPD exacerbation  - f/u repeat ABG after trial of BiPAP     _________MSK___________  no active issues     __________ID____________  #SIRS   - pt met SIRS criteria with tachycardia and WC 15   - no obvious source of infection, afebrile, hemodynamically stable   - lactate 1   - monitor off bax  - f/u u/a, ucx if positive  - f/u bcx     _________ENDO__________  #T2IDDM  - on novlog mix 70/30 bid at home, 58u in the AM, 46u in the evening + novolin R sliding scale   - start lantus 30 u at bedtime (decreased dose given NPO status, mod ISSq6 while NPO   - last a1c 9.4, 1/2024, f/u a1c    _______HEME/ONC_______  no active issues     _________SKIN____________  no active issues   Peripheral IVs     ________Prophylaxis_______  #DVT- heparin subq  #GI- ppi      __________GOC/ DISPO___________  FULL CODE, ICU  Pt is a 64M from [facility], w/ PMHx of CAD, CHF, DM, CYNTHIA on nocturnal bipap, COPD (multiple intubations in past on 3L of home O2), peripheral neuropathy, GERD, HTN, HLD, obesity, polyarthritis, Pulmonary HTN, TIA, BPH, depression/anxiety p/w respiratory distress from NH. Placed on BiPAP, s/p lasix 80, solumedrol 125 . Admitted for AHRF 2/2 COPD vs. CHF exacerbation.     #AHRF 2/2 COPD exacerbation vs. CHF exacerbation  #COPD  #HFpEF   #CAD  #HTN  #HLD  #CYNTHIA on nocturnal bipap  #Pulm HTN  #BPH  #Depression/Anxiety   #GERD    _________CNS___________  #anxiety/depression  - resume Trazadone, risperidone, alprazolam once off NPO      _________CVS___________  #HFpEF   - not clinically overloaded in the ED, POCUS a-line predominant, grossly preserved systolic fxn   - TTE from 10/2023- EF 50-55%, G1DD    - proBNP 11K  - Trop 145   - EKG: sinus tachycardia, RBBB (compared to prior)  - CXR shows no consolidation, congestion, effusions pending official read   - s/p lasix 80 x1     - monitor I&O, for now will hold lasix until tomorrow if additional diuresis needed given that pt does not appear clinically overloaded   - f/u repeat trop  - f/u TTE     #CAD  - c/w asa, statin once off NPO     #HTN  - not on anti-hypertensives  - continue to monitor    #HLD   - c/w statin once off NPO     _________ RESP__________  #AHRF 2/2 COPD exacerbation vs. CHF exacerbation  #COPD  #CYNTHIA  - s/p lasix 80 and solumedrol 125   - c/w BiPAP  - c/w solumedrol 40q12  - duoneb x3 doses stat, then q6 RTC  - start advair   - f/u repeat ABG after a few hours on BiPAP  - f/u repeat trop  - f/u TTE     __________GI____________  #Nutrition  - NPO on BiPAP    #GERD  - PPI     ________ RENAL__________  #RADHIKA  - baseline .66 in 2024, now 1.4  - likely pre-renal in nature vs. post-renal obstruction/retention related   - mark placed in ED 3/29  - montior Cr post diuresis, strict I&O    #BPH  - mark in place  - resume Flomax and finasteride once off NPO     #Respiratory Acidosis   - ABG 7.21/89/53/36 > 7.23/83/66/35   - in the setting of AHRF,COPD exacerbation  - f/u repeat ABG after trial of BiPAP     _________MSK___________  no active issues     __________ID____________  #SIRS   - pt met SIRS criteria with tachycardia and WC 15   - no obvious source of infection, afebrile, hemodynamically stable   - lactate 1   - monitor off bax  - f/u u/a, ucx if positive  - f/u bcx     _________ENDO__________  #T2IDDM  - on novlog mix 70/30 bid at home, 58u in the AM, 46u in the evening + novolin R sliding scale   - start lantus 30 u at bedtime (decreased dose given NPO status, mod ISSq6 while NPO   - last a1c 9.4, 1/2024, f/u a1c    _______HEME/ONC_______  no active issues     _________SKIN____________  no active issues   Peripheral IVs     ________Prophylaxis_______  #DVT- heparin subq  #GI- ppi      __________GOC/ DISPO___________  FULL CODE, ICU

## 2025-03-29 NOTE — H&P ADULT - HISTORY OF PRESENT ILLNESS
Pt is a 64M from [facility], w/ PMHx of CAD, CHF, DM, CYNTHIA on nocturnal bipap, COPD (multiple intubations in past on 3L of home O2), peripheral neuropathy, GERD, HTN, HLD, obesity, polyarthritis, Pulmonary HTN, TIA, p/w respiratory distress from NH. Pt mentating, A&Ox2-3, states that he has been having a productive cough, chest tightness, abdominal pain/gas. Otherwise pt appears mildly confused and anxious, unable to participate in full history taking.     Vitals in the ED: 98.1F, 118 HR, 126/72, 96% on BiPAP 18/8  limited RVP neg   Labs sig for: trop 145, proBNP 11,214, BUN/Cr 35/1.4, WC 15.42   ABG 7.21/89/53/36 > 7.23/83/66/35   EKG: sinus tachycardia, RBBB  CXR shows no consolidation, congestion, effusions pending official read     TTE from 10/2023- EF 50-55%, G1DD      Last admission [facility] 1/2024 for  increased agitation and paranoia at NH, treated for UTI with 3d CTX   Pt is a 64M from [facility], w/ PMHx of CAD, CHF, DM, CYNTHIA on nocturnal bipap, COPD (multiple intubations in past on 3L of home O2), peripheral neuropathy, GERD, HTN, HLD, obesity, polyarthritis, Pulmonary HTN, TIA, BPH, depression/anxiety  p/w respiratory distress from NH. Pt mentating, A&Ox2-3, states that he has been having a productive cough, chest tightness, abdominal pain/gas. Otherwise pt appears mildly confused and anxious, unable to participate in full history taking.     Vitals in the ED: 98.1F, 118 HR, 126/72, 96% on BiPAP 18/8  limited RVP neg   Labs sig for: trop 145, proBNP 11,214, BUN/Cr 35/1.4, WC 15.42   ABG 7.21/89/53/36 > 7.23/83/66/35   EKG: sinus tachycardia, RBBB  CXR shows no consolidation, congestion, effusions pending official read     TTE from 10/2023- EF 50-55%, G1DD      Last admission [facility] 1/2024 for  increased agitation and paranoia at NH, treated for UTI with 3d CTX

## 2025-03-29 NOTE — H&P ADULT - NSICDXPASTMEDICALHX_GEN_ALL_CORE_FT
PAST MEDICAL HISTORY:  Bipolar disorder     CAD (coronary artery disease)     CAD (coronary artery disease)     CHF (congestive heart failure)     COPD (chronic obstructive pulmonary disease) Oxygen 2-3L at home    COPD (chronic obstructive pulmonary disease)     DM (diabetes mellitus)     DM (diabetes mellitus)     GERD (gastroesophageal reflux disease)     HLD (hyperlipidemia)     HTN (hypertension)     HTN (hypertension)     Insomnia     Mood disorder     CYNTHIA treated with BiPAP     Pulmonary HTN     Stented coronary artery 2017    Type 2 diabetes mellitus without complication, with long-term current use of insulin

## 2025-03-29 NOTE — ED ADULT NURSE NOTE - OBJECTIVE STATEMENT
pt [facility]  from pt [facility]  from [facility] D/T RESPIRATORY DISTRESS. PT PMH: COPD, PT BASELINE OX 2-3 LPM.

## 2025-03-29 NOTE — PATIENT PROFILE ADULT - FUNCTIONAL ASSESSMENT - DAILY ACTIVITY SCORE.
[facility] [place] 6-week postop visit  Cecilia Farley returns in follow-up of radiofrequency ablation of her left great saphenous vein, left anterior accessory saphenous vein and multiple phlebectomies.  The preoperative pain for which she presented is gone and she is very happy with that.  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  This is improving, however.    Exam  No residual varicose veins.  No significant residual ecchymosis.  All phlebectomy sites are healing well.    She has some numbness from the vein access site near the mid leg down to the anteromedial ankle.  There is a small 3 cm diameter area of mild dysesthesia in the mid anteromedial left leg.  This is much smaller than previously.    Impression  Overall she is doing well and the paresthesias/dysesthesias are improving.  She has been afraid to resume normal activities and a fear of injuring/damaging her leg from surgery.  I reassured her today that she should resume normal activities without any restrictions.  This will help with her healing.    Of asked her to give us a progress report on the numbness/dysesthesias in 3 months.  She will then return in 6 months for a left lower extremity venous ultrasound.    ELIJAH Farmer MD    Dictated using Dragon voice recognition software which may result in transcription errors   16

## 2025-03-29 NOTE — H&P ADULT - ATTENDING COMMENTS
64M from [facility], w/ PMHx of CAD, CHF, DM, CYNTHIA on nocturnal bipap, COPD (multiple intubations in past on 3L of home O2), peripheral neuropathy, GERD, HTN, HLD, obesity, polyarthritis, Pulmonary HTN, TIA, BPH, depression/anxiety  p/w respiratory distress from NH. Placed on bipap called to evaluate for continuous bipap. On exam patient mentating well, lungs CTA, no significant peripheral edema. No significant b-lines on POCUS. Labs significant for hypercapnia, elevated BNP, mild elevation in troponin. s/p solumedrol, lasix in ED.    Assessment:  - Acute on chronic hypercapnic respiratory failure  - COPD, unclear if symptoms of acute exacerbation  - HFpEF   - RADHIKA  - Pulm HTN  - CYNTHIA  - DM  - CAD  - HTN    Plan:  - admit to ICU  - continue bipap, wean as tolerated  - monitor ABG and adjust as settings as needed  - solumedrol 40mg q12h  - duonebs  - obtain TTE  - s/p lasix in ED, monitor response, not clearly overloaded to suggest decompensated CHF  - trend troponin, EKG  - strict I&O  - urine lytes  - glucose monitoring  - insulin long acting and ISS  - DVT ppx  - stress ulcer prophylaxis  - full code

## 2025-03-29 NOTE — H&P ADULT - NSICDXFAMILYHX_GEN_ALL_CORE_FT
FAMILY HISTORY:  No family history of hypertension  No family history of mental disorder  No family history of mental disorder    Mother  Still living? No  FH: myocardial infarction, Age at diagnosis: Age Unknown

## 2025-03-30 LAB
A1C WITH ESTIMATED AVERAGE GLUCOSE RESULT: 11.3 % — HIGH (ref 4–5.6)
ALBUMIN SERPL ELPH-MCNC: 2.9 G/DL — LOW (ref 3.5–5)
ALP SERPL-CCNC: 92 U/L — SIGNIFICANT CHANGE UP (ref 40–120)
ALT FLD-CCNC: 23 U/L DA — SIGNIFICANT CHANGE UP (ref 10–60)
ANION GAP SERPL CALC-SCNC: 6 MMOL/L — SIGNIFICANT CHANGE UP (ref 5–17)
AST SERPL-CCNC: 11 U/L — SIGNIFICANT CHANGE UP (ref 10–40)
BASE EXCESS BLDA CALC-SCNC: 10.2 MMOL/L — HIGH (ref -2–3)
BASE EXCESS BLDA CALC-SCNC: 10.5 MMOL/L — HIGH (ref -2–3)
BASE EXCESS BLDA CALC-SCNC: 11.5 MMOL/L — HIGH (ref -2–3)
BASOPHILS # BLD AUTO: 0.03 K/UL — SIGNIFICANT CHANGE UP (ref 0–0.2)
BASOPHILS NFR BLD AUTO: 0.3 % — SIGNIFICANT CHANGE UP (ref 0–2)
BILIRUB SERPL-MCNC: 0.2 MG/DL — SIGNIFICANT CHANGE UP (ref 0.2–1.2)
BLOOD GAS COMMENTS ARTERIAL: SIGNIFICANT CHANGE UP
BUN SERPL-MCNC: 40 MG/DL — HIGH (ref 7–18)
CALCIUM SERPL-MCNC: 9.4 MG/DL — SIGNIFICANT CHANGE UP (ref 8.4–10.5)
CHLORIDE SERPL-SCNC: 96 MMOL/L — SIGNIFICANT CHANGE UP (ref 96–108)
CO2 SERPL-SCNC: 34 MMOL/L — HIGH (ref 22–31)
CREAT SERPL-MCNC: 1.11 MG/DL — SIGNIFICANT CHANGE UP (ref 0.5–1.3)
EGFR: 75 ML/MIN/1.73M2 — SIGNIFICANT CHANGE UP
EGFR: 75 ML/MIN/1.73M2 — SIGNIFICANT CHANGE UP
EOSINOPHIL # BLD AUTO: 0 K/UL — SIGNIFICANT CHANGE UP (ref 0–0.5)
EOSINOPHIL NFR BLD AUTO: 0 % — SIGNIFICANT CHANGE UP (ref 0–6)
ESTIMATED AVERAGE GLUCOSE: 278 MG/DL — HIGH (ref 68–114)
GLUCOSE BLDC GLUCOMTR-MCNC: 280 MG/DL — HIGH (ref 70–99)
GLUCOSE BLDC GLUCOMTR-MCNC: 306 MG/DL — HIGH (ref 70–99)
GLUCOSE BLDC GLUCOMTR-MCNC: 333 MG/DL — HIGH (ref 70–99)
GLUCOSE BLDC GLUCOMTR-MCNC: 356 MG/DL — HIGH (ref 70–99)
GLUCOSE BLDC GLUCOMTR-MCNC: 376 MG/DL — HIGH (ref 70–99)
GLUCOSE BLDC GLUCOMTR-MCNC: 417 MG/DL — HIGH (ref 70–99)
GLUCOSE SERPL-MCNC: 378 MG/DL — HIGH (ref 70–99)
HCO3 BLDA-SCNC: 36 MMOL/L — HIGH (ref 21–28)
HCO3 BLDA-SCNC: 38 MMOL/L — HIGH (ref 21–28)
HCO3 BLDA-SCNC: 39 MMOL/L — HIGH (ref 21–28)
HCT VFR BLD CALC: 41.3 % — SIGNIFICANT CHANGE UP (ref 39–50)
HGB BLD-MCNC: 12.7 G/DL — LOW (ref 13–17)
HOROWITZ INDEX BLDA+IHG-RTO: 40 — SIGNIFICANT CHANGE UP
HOROWITZ INDEX BLDA+IHG-RTO: 40 — SIGNIFICANT CHANGE UP
HOROWITZ INDEX BLDA+IHG-RTO: 60 — SIGNIFICANT CHANGE UP
IMM GRANULOCYTES NFR BLD AUTO: 1 % — HIGH (ref 0–0.9)
LYMPHOCYTES # BLD AUTO: 0.95 K/UL — LOW (ref 1–3.3)
LYMPHOCYTES # BLD AUTO: 8 % — LOW (ref 13–44)
MAGNESIUM SERPL-MCNC: 2.2 MG/DL — SIGNIFICANT CHANGE UP (ref 1.6–2.6)
MCHC RBC-ENTMCNC: 26.8 PG — LOW (ref 27–34)
MCHC RBC-ENTMCNC: 30.8 G/DL — LOW (ref 32–36)
MCV RBC AUTO: 87.3 FL — SIGNIFICANT CHANGE UP (ref 80–100)
MONOCYTES # BLD AUTO: 0.51 K/UL — SIGNIFICANT CHANGE UP (ref 0–0.9)
MONOCYTES NFR BLD AUTO: 4.3 % — SIGNIFICANT CHANGE UP (ref 2–14)
NEUTROPHILS # BLD AUTO: 10.22 K/UL — HIGH (ref 1.8–7.4)
NEUTROPHILS NFR BLD AUTO: 86.4 % — HIGH (ref 43–77)
NRBC BLD AUTO-RTO: 0 /100 WBCS — SIGNIFICANT CHANGE UP (ref 0–0)
PCO2 BLDA: 55 MMHG — HIGH (ref 35–48)
PCO2 BLDA: 63 MMHG — HIGH (ref 35–48)
PCO2 BLDA: 65 MMHG — HIGH (ref 35–48)
PH BLDA: 7.39 — SIGNIFICANT CHANGE UP (ref 7.35–7.45)
PH BLDA: 7.39 — SIGNIFICANT CHANGE UP (ref 7.35–7.45)
PH BLDA: 7.43 — SIGNIFICANT CHANGE UP (ref 7.35–7.45)
PHOSPHATE SERPL-MCNC: 3.3 MG/DL — SIGNIFICANT CHANGE UP (ref 2.5–4.5)
PLATELET # BLD AUTO: 231 K/UL — SIGNIFICANT CHANGE UP (ref 150–400)
PO2 BLDA: 110 MMHG — HIGH (ref 83–108)
PO2 BLDA: 90 MMHG — SIGNIFICANT CHANGE UP (ref 83–108)
PO2 BLDA: 92 MMHG — SIGNIFICANT CHANGE UP (ref 83–108)
POTASSIUM SERPL-MCNC: 5 MMOL/L — SIGNIFICANT CHANGE UP (ref 3.5–5.3)
POTASSIUM SERPL-SCNC: 5 MMOL/L — SIGNIFICANT CHANGE UP (ref 3.5–5.3)
PROT SERPL-MCNC: 7.8 G/DL — SIGNIFICANT CHANGE UP (ref 6–8.3)
RBC # BLD: 4.73 M/UL — SIGNIFICANT CHANGE UP (ref 4.2–5.8)
RBC # FLD: 13.5 % — SIGNIFICANT CHANGE UP (ref 10.3–14.5)
SAO2 % BLDA: 98 % — SIGNIFICANT CHANGE UP
SAO2 % BLDA: 98 % — SIGNIFICANT CHANGE UP
SAO2 % BLDA: 99 % — SIGNIFICANT CHANGE UP
SODIUM SERPL-SCNC: 136 MMOL/L — SIGNIFICANT CHANGE UP (ref 135–145)
WBC # BLD: 11.83 K/UL — HIGH (ref 3.8–10.5)
WBC # FLD AUTO: 11.83 K/UL — HIGH (ref 3.8–10.5)

## 2025-03-30 PROCEDURE — 99232 SBSQ HOSP IP/OBS MODERATE 35: CPT

## 2025-03-30 PROCEDURE — 71045 X-RAY EXAM CHEST 1 VIEW: CPT | Mod: 26

## 2025-03-30 RX ORDER — FENTANYL CITRATE-0.9 % NACL/PF 100MCG/2ML
50 SYRINGE (ML) INTRAVENOUS ONCE
Refills: 0 | Status: DISCONTINUED | OUTPATIENT
Start: 2025-03-30 | End: 2025-03-30

## 2025-03-30 RX ORDER — ROCURONIUM BROMIDE 10 MG/ML
50 INJECTION, SOLUTION INTRAVENOUS ONCE
Refills: 0 | Status: COMPLETED | OUTPATIENT
Start: 2025-03-30 | End: 2025-03-30

## 2025-03-30 RX ORDER — INSULIN GLARGINE-YFGN 100 [IU]/ML
45 INJECTION, SOLUTION SUBCUTANEOUS AT BEDTIME
Refills: 0 | Status: DISCONTINUED | OUTPATIENT
Start: 2025-03-30 | End: 2025-03-31

## 2025-03-30 RX ORDER — INSULIN LISPRO 100 U/ML
INJECTION, SOLUTION INTRAVENOUS; SUBCUTANEOUS EVERY 6 HOURS
Refills: 0 | Status: DISCONTINUED | OUTPATIENT
Start: 2025-03-30 | End: 2025-04-01

## 2025-03-30 RX ORDER — INSULIN LISPRO 100 U/ML
INJECTION, SOLUTION INTRAVENOUS; SUBCUTANEOUS
Refills: 0 | Status: DISCONTINUED | OUTPATIENT
Start: 2025-03-30 | End: 2025-03-30

## 2025-03-30 RX ORDER — INSULIN GLARGINE-YFGN 100 [IU]/ML
10 INJECTION, SOLUTION SUBCUTANEOUS ONCE
Refills: 0 | Status: COMPLETED | OUTPATIENT
Start: 2025-03-30 | End: 2025-03-30

## 2025-03-30 RX ORDER — DEXMEDETOMIDINE HYDROCHLORIDE IN SODIUM CHLORIDE 4 UG/ML
0.5 INJECTION INTRAVENOUS
Qty: 400 | Refills: 0 | Status: DISCONTINUED | OUTPATIENT
Start: 2025-03-30 | End: 2025-03-30

## 2025-03-30 RX ORDER — ACETAMINOPHEN 500 MG/5ML
1000 LIQUID (ML) ORAL ONCE
Refills: 0 | Status: COMPLETED | OUTPATIENT
Start: 2025-03-30 | End: 2025-03-30

## 2025-03-30 RX ORDER — NOREPINEPHRINE BITARTRATE 8 MG
0.05 SOLUTION INTRAVENOUS
Qty: 8 | Refills: 0 | Status: DISCONTINUED | OUTPATIENT
Start: 2025-03-30 | End: 2025-04-03

## 2025-03-30 RX ORDER — FENTANYL CITRATE-0.9 % NACL/PF 100MCG/2ML
1 SYRINGE (ML) INTRAVENOUS
Qty: 2500 | Refills: 0 | Status: DISCONTINUED | OUTPATIENT
Start: 2025-03-30 | End: 2025-03-30

## 2025-03-30 RX ORDER — PROPOFOL 10 MG/ML
20 INJECTION, EMULSION INTRAVENOUS
Qty: 1000 | Refills: 0 | Status: DISCONTINUED | OUTPATIENT
Start: 2025-03-30 | End: 2025-04-02

## 2025-03-30 RX ORDER — DEXMEDETOMIDINE HYDROCHLORIDE IN SODIUM CHLORIDE 4 UG/ML
0.2 INJECTION INTRAVENOUS
Qty: 200 | Refills: 0 | Status: DISCONTINUED | OUTPATIENT
Start: 2025-03-30 | End: 2025-03-30

## 2025-03-30 RX ORDER — FENTANYL CITRATE-0.9 % NACL/PF 100MCG/2ML
1 SYRINGE (ML) INTRAVENOUS
Qty: 5000 | Refills: 0 | Status: DISCONTINUED | OUTPATIENT
Start: 2025-03-30 | End: 2025-03-30

## 2025-03-30 RX ORDER — INSULIN LISPRO 100 U/ML
INJECTION, SOLUTION INTRAVENOUS; SUBCUTANEOUS AT BEDTIME
Refills: 0 | Status: DISCONTINUED | OUTPATIENT
Start: 2025-03-30 | End: 2025-03-30

## 2025-03-30 RX ORDER — ETOMIDATE 2 MG/ML
20 AMPUL (ML) INTRAVENOUS ONCE
Refills: 0 | Status: COMPLETED | OUTPATIENT
Start: 2025-03-30 | End: 2025-03-30

## 2025-03-30 RX ORDER — INSULIN LISPRO 100 U/ML
4 INJECTION, SOLUTION INTRAVENOUS; SUBCUTANEOUS
Refills: 0 | Status: DISCONTINUED | OUTPATIENT
Start: 2025-03-30 | End: 2025-03-30

## 2025-03-30 RX ORDER — FENTANYL CITRATE-0.9 % NACL/PF 100MCG/2ML
1.5 SYRINGE (ML) INTRAVENOUS
Qty: 2500 | Refills: 0 | Status: DISCONTINUED | OUTPATIENT
Start: 2025-03-30 | End: 2025-04-02

## 2025-03-30 RX ADMIN — PROPOFOL 11.6 MICROGRAM(S)/KG/MIN: 10 INJECTION, EMULSION INTRAVENOUS at 19:16

## 2025-03-30 RX ADMIN — METHYLPREDNISOLONE ACETATE 40 MILLIGRAM(S): 80 INJECTION, SUSPENSION INTRA-ARTICULAR; INTRALESIONAL; INTRAMUSCULAR; SOFT TISSUE at 17:18

## 2025-03-30 RX ADMIN — Medication 20 MILLIGRAM(S): at 17:45

## 2025-03-30 RX ADMIN — NOREPINEPHRINE BITARTRATE 9.03 MICROGRAM(S)/KG/MIN: 8 SOLUTION at 18:15

## 2025-03-30 RX ADMIN — IPRATROPIUM BROMIDE AND ALBUTEROL SULFATE 3 MILLILITER(S): .5; 2.5 SOLUTION RESPIRATORY (INHALATION) at 08:44

## 2025-03-30 RX ADMIN — Medication 15 MILLILITER(S): at 17:18

## 2025-03-30 RX ADMIN — INSULIN LISPRO 6: 100 INJECTION, SOLUTION INTRAVENOUS; SUBCUTANEOUS at 23:02

## 2025-03-30 RX ADMIN — Medication 1000 MILLIGRAM(S): at 20:16

## 2025-03-30 RX ADMIN — INSULIN LISPRO 8: 100 INJECTION, SOLUTION INTRAVENOUS; SUBCUTANEOUS at 18:15

## 2025-03-30 RX ADMIN — Medication 1 DROP(S): at 05:13

## 2025-03-30 RX ADMIN — INSULIN LISPRO 12: 100 INJECTION, SOLUTION INTRAVENOUS; SUBCUTANEOUS at 12:17

## 2025-03-30 RX ADMIN — Medication 1 APPLICATION(S): at 05:14

## 2025-03-30 RX ADMIN — INSULIN LISPRO 10: 100 INJECTION, SOLUTION INTRAVENOUS; SUBCUTANEOUS at 06:27

## 2025-03-30 RX ADMIN — INSULIN GLARGINE-YFGN 10 UNIT(S): 100 INJECTION, SOLUTION SUBCUTANEOUS at 15:12

## 2025-03-30 RX ADMIN — METHYLPREDNISOLONE ACETATE 40 MILLIGRAM(S): 80 INJECTION, SUSPENSION INTRA-ARTICULAR; INTRALESIONAL; INTRAMUSCULAR; SOFT TISSUE at 05:13

## 2025-03-30 RX ADMIN — PROPOFOL 11.6 MICROGRAM(S)/KG/MIN: 10 INJECTION, EMULSION INTRAVENOUS at 23:04

## 2025-03-30 RX ADMIN — Medication 1 DOSE(S): at 12:43

## 2025-03-30 RX ADMIN — Medication 50 MICROGRAM(S): at 18:58

## 2025-03-30 RX ADMIN — Medication 50 MICROGRAM(S): at 18:04

## 2025-03-30 RX ADMIN — Medication 1000 MILLIGRAM(S): at 17:35

## 2025-03-30 RX ADMIN — IPRATROPIUM BROMIDE AND ALBUTEROL SULFATE 3 MILLILITER(S): .5; 2.5 SOLUTION RESPIRATORY (INHALATION) at 14:12

## 2025-03-30 RX ADMIN — Medication 9.63 MICROGRAM(S)/KG/HR: at 18:15

## 2025-03-30 RX ADMIN — DEXMEDETOMIDINE HYDROCHLORIDE IN SODIUM CHLORIDE 12 MICROGRAM(S)/KG/HR: 4 INJECTION INTRAVENOUS at 06:26

## 2025-03-30 RX ADMIN — DEXMEDETOMIDINE HYDROCHLORIDE IN SODIUM CHLORIDE 12 MICROGRAM(S)/KG/HR: 4 INJECTION INTRAVENOUS at 00:47

## 2025-03-30 RX ADMIN — INSULIN GLARGINE-YFGN 45 UNIT(S): 100 INJECTION, SOLUTION SUBCUTANEOUS at 22:55

## 2025-03-30 RX ADMIN — IPRATROPIUM BROMIDE AND ALBUTEROL SULFATE 3 MILLILITER(S): .5; 2.5 SOLUTION RESPIRATORY (INHALATION) at 20:35

## 2025-03-30 RX ADMIN — Medication 400 MILLIGRAM(S): at 17:17

## 2025-03-30 RX ADMIN — Medication 1 DROP(S): at 17:09

## 2025-03-30 RX ADMIN — Medication 400 MILLIGRAM(S): at 20:04

## 2025-03-30 RX ADMIN — ROCURONIUM BROMIDE 50 MILLIGRAM(S): 10 INJECTION, SOLUTION INTRAVENOUS at 17:45

## 2025-03-30 RX ADMIN — LATANOPROST PF 1 DROP(S): 0.05 SOLUTION/ DROPS OPHTHALMIC at 21:08

## 2025-03-30 RX ADMIN — DEXMEDETOMIDINE HYDROCHLORIDE IN SODIUM CHLORIDE 12 MICROGRAM(S)/KG/HR: 4 INJECTION INTRAVENOUS at 07:37

## 2025-03-30 RX ADMIN — Medication 40 MILLIGRAM(S): at 11:07

## 2025-03-30 RX ADMIN — CEFTRIAXONE 100 MILLIGRAM(S): 500 INJECTION, POWDER, FOR SOLUTION INTRAMUSCULAR; INTRAVENOUS at 17:18

## 2025-03-30 NOTE — AIRWAY PLACEMENT NOTE ADULT - AIRWAY COMMENTS:
Thick yellowish secretions suctioned from oropharynx prior to insertion of the ET tube. Vital signs remained stable throughout the procedure.

## 2025-03-30 NOTE — CHART NOTE - NSCHARTNOTEFT_GEN_A_CORE
Contacted Contacted Garfield Porras at [phone] - informed family member about patient's increased work of breathing throughout day requiring intubation. Informed intubation successful and patient on ventilator. Family member reported that patient has had multiple admissions now over past few years requiring intubation. Contacted Garfield Porras at [phone] - informed family member about patient's increased work of breathing throughout day requiring intubation. Informed intubation successful and patient on ventilator. Family member reported that patient has had multiple admissions now over past few years requiring intubation. Answered all questions.

## 2025-03-30 NOTE — PROGRESS NOTE ADULT - SUBJECTIVE AND OBJECTIVE BOX
Patient is a 64y old  Male who presents with a chief complaint of AHRF 2/2 COPD exacerbation (29 Mar 2025 15:08)      INTERVAL HPI/OVERNIGHT EVENTS:   No overnight events   Afebrile, hemodynamically stable     Subjective: Patient seen and examined at bedside.    ICU Vital Signs Last 24 Hrs  T(C): 35.8 (29 Mar 2025 16:46), Max: 36.8 (29 Mar 2025 11:57)  T(F): 96.4 (29 Mar 2025 16:46), Max: 98.2 (29 Mar 2025 11:57)  HR: 118 (29 Mar 2025 22:00) (111 - 121)  BP: 119/74 (29 Mar 2025 22:00) (107/71 - 151/140)  BP(mean): 86 (29 Mar 2025 22:00) (83 - 146)  ABP: --  ABP(mean): --  RR: 31 (29 Mar 2025 22:00) (19 - 35)  SpO2: 93% (29 Mar 2025 22:00) (93% - 98%)    O2 Parameters below as of 29 Mar 2025 22:00  Patient On (Oxygen Delivery Method): BiPAP/CPAP    O2 Concentration (%): 40      I&O's Summary    29 Mar 2025 07:01  -  30 Mar 2025 00:09  --------------------------------------------------------  IN: 50 mL / OUT: 600 mL / NET: -550 mL          PHYSICAL EXAM:  GENERAL: No acute distress   HEAD:  Atraumatic, Normocephalic  EYES: EOMI, PERRLA, conjunctiva and sclera clear  ENMT: No tonsillar erythema, exudates, or enlargement; Moist mucous membranes  NECK: Supple, No JVD, Normal thyroid  HEART: Regular rate and rhythm; No murmurs, rubs, or gallops  RESPIRATORY: CTA B/L, No W/R/R  ABDOMEN: Soft, Nontender, Nondistended; Bowel sounds present  NEUROLOGY: A&Ox3, nonfocal, moving all extremities  EXTREMITIES:  2+ Peripheral Pulses, No clubbing, cyanosis, or edema  SKIN: warm, dry, normal color, no rash or abnormal lesions    LABS:                        12.3   13.97 )-----------( 210      ( 29 Mar 2025 17:25 )             40.3     03-29    135  |  95[L]  |  38[H]  ----------------------------<  300[H]  5.0   |  34[H]  |  1.22    Ca    9.1      29 Mar 2025 17:25  Phos  5.3     03-29  Mg     2.2     03-29    TPro  8.3  /  Alb  3.1[L]  /  TBili  0.2  /  DBili  x   /  AST  17  /  ALT  26  /  AlkPhos  102  03-29    PT/INR - ( 29 Mar 2025 12:00 )   PT: 12.7 sec;   INR: 1.10 ratio         PTT - ( 29 Mar 2025 12:00 )  PTT:31.7 sec  Urinalysis Basic - ( 29 Mar 2025 17:25 )    Color: x / Appearance: x / SG: x / pH: x  Gluc: 300 mg/dL / Ketone: x  / Bili: x / Urobili: x   Blood: x / Protein: x / Nitrite: x   Leuk Esterase: x / RBC: x / WBC x   Sq Epi: x / Non Sq Epi: x / Bacteria: x      CAPILLARY BLOOD GLUCOSE      POCT Blood Glucose.: 336 mg/dL (29 Mar 2025 22:55)  POCT Blood Glucose.: 257 mg/dL (29 Mar 2025 11:29)    ABG - ( 29 Mar 2025 16:52 )  pH, Arterial: 7.36  pH, Blood: x     /  pCO2: 66    /  pO2: 80    / HCO3: 37    / Base Excess: 9.2   /  SaO2: 97                  Consultant(s) Notes Reviewed:  [x ] YES  [ ] NO    MEDICATIONS  (STANDING):  albuterol/ipratropium for Nebulization 3 milliLiter(s) Nebulizer every 30 minutes  albuterol/ipratropium for Nebulization 3 milliLiter(s) Nebulizer every 6 hours  artificial  tears Solution 1 Drop(s) Both EYES two times a day  cefTRIAXone   IVPB 1000 milliGRAM(s) IV Intermittent every 24 hours  cefTRIAXone   IVPB      chlorhexidine 2% Cloths 1 Application(s) Topical <User Schedule>  fluticasone propionate/ salmeterol 100-50 MICROgram(s) Diskus 1 Dose(s) Inhalation two times a day  insulin glargine Injectable (LANTUS) 30 Unit(s) SubCutaneous at bedtime  insulin lispro (ADMELOG) corrective regimen sliding scale   SubCutaneous every 6 hours  latanoprost 0.005% Ophthalmic Solution 1 Drop(s) Both EYES at bedtime  methylPREDNISolone sodium succinate Injectable 40 milliGRAM(s) IV Push every 12 hours  pantoprazole  Injectable 40 milliGRAM(s) IV Push daily    MEDICATIONS  (PRN):      Care Discussed with Consultants/Other Providers [ x] YES  [ ] NO    RADIOLOGY & ADDITIONAL TESTS: Patient is a 64y old  Male who presents with a chief complaint of AHRF 2/2 COPD exacerbation (29 Mar 2025 15:08)      INTERVAL HPI/OVERNIGHT EVENTS:   No overnight events   Afebrile, hemodynamically stable     Subjective: Patient seen and examined at bedside. Patient noted to be drowsy from precedex, increased secretions, slightly tachypneic on venti mask, switched back to bipap.     ICU Vital Signs Last 24 Hrs  T(C): 35.8 (29 Mar 2025 16:46), Max: 36.8 (29 Mar 2025 11:57)  T(F): 96.4 (29 Mar 2025 16:46), Max: 98.2 (29 Mar 2025 11:57)  HR: 118 (29 Mar 2025 22:00) (111 - 121)  BP: 119/74 (29 Mar 2025 22:00) (107/71 - 151/140)  BP(mean): 86 (29 Mar 2025 22:00) (83 - 146)  ABP: --  ABP(mean): --  RR: 31 (29 Mar 2025 22:00) (19 - 35)  SpO2: 93% (29 Mar 2025 22:00) (93% - 98%)    O2 Parameters below as of 29 Mar 2025 22:00  Patient On (Oxygen Delivery Method): BiPAP/CPAP    O2 Concentration (%): 40      I&O's Summary    29 Mar 2025 07:01  -  30 Mar 2025 00:09  --------------------------------------------------------  IN: 50 mL / OUT: 600 mL / NET: -550 mL          PHYSICAL EXAM:  GENERAL: No acute distress, on BIPAP   HEAD:  Atraumatic, Normocephalic  EYES: EOMI, PERRLA, conjunctiva and sclera clear  ENMT: No tonsillar erythema, exudates, or enlargement; Moist mucous membranes  NECK: Supple, No JVD, Normal thyroid  HEART: Regular rate and rhythm; No murmurs, rubs, or gallops  RESPIRATORY: Lung sounds difficult to appreciate due to body habitus, decreased breath sounds b/l  ABDOMEN: Soft, Nontender, Nondistended; Bowel sounds present  NEUROLOGY: A&Ox3, nonfocal, moving all extremities  EXTREMITIES:  2+ Peripheral Pulses, No clubbing, cyanosis, or edema  SKIN: warm, dry, normal color, no rash or abnormal lesions    LABS:                        12.3   13.97 )-----------( 210      ( 29 Mar 2025 17:25 )             40.3     03-29    135  |  95[L]  |  38[H]  ----------------------------<  300[H]  5.0   |  34[H]  |  1.22    Ca    9.1      29 Mar 2025 17:25  Phos  5.3     03-29  Mg     2.2     03-29    TPro  8.3  /  Alb  3.1[L]  /  TBili  0.2  /  DBili  x   /  AST  17  /  ALT  26  /  AlkPhos  102  03-29    PT/INR - ( 29 Mar 2025 12:00 )   PT: 12.7 sec;   INR: 1.10 ratio         PTT - ( 29 Mar 2025 12:00 )  PTT:31.7 sec  Urinalysis Basic - ( 29 Mar 2025 17:25 )    Color: x / Appearance: x / SG: x / pH: x  Gluc: 300 mg/dL / Ketone: x  / Bili: x / Urobili: x   Blood: x / Protein: x / Nitrite: x   Leuk Esterase: x / RBC: x / WBC x   Sq Epi: x / Non Sq Epi: x / Bacteria: x      CAPILLARY BLOOD GLUCOSE      POCT Blood Glucose.: 336 mg/dL (29 Mar 2025 22:55)  POCT Blood Glucose.: 257 mg/dL (29 Mar 2025 11:29)    ABG - ( 29 Mar 2025 16:52 )  pH, Arterial: 7.36  pH, Blood: x     /  pCO2: 66    /  pO2: 80    / HCO3: 37    / Base Excess: 9.2   /  SaO2: 97                  Consultant(s) Notes Reviewed:  [x ] YES  [ ] NO    MEDICATIONS  (STANDING):  albuterol/ipratropium for Nebulization 3 milliLiter(s) Nebulizer every 30 minutes  albuterol/ipratropium for Nebulization 3 milliLiter(s) Nebulizer every 6 hours  artificial  tears Solution 1 Drop(s) Both EYES two times a day  cefTRIAXone   IVPB 1000 milliGRAM(s) IV Intermittent every 24 hours  cefTRIAXone   IVPB      chlorhexidine 2% Cloths 1 Application(s) Topical <User Schedule>  fluticasone propionate/ salmeterol 100-50 MICROgram(s) Diskus 1 Dose(s) Inhalation two times a day  insulin glargine Injectable (LANTUS) 30 Unit(s) SubCutaneous at bedtime  insulin lispro (ADMELOG) corrective regimen sliding scale   SubCutaneous every 6 hours  latanoprost 0.005% Ophthalmic Solution 1 Drop(s) Both EYES at bedtime  methylPREDNISolone sodium succinate Injectable 40 milliGRAM(s) IV Push every 12 hours  pantoprazole  Injectable 40 milliGRAM(s) IV Push daily    MEDICATIONS  (PRN):      Care Discussed with Consultants/Other Providers [ x] YES  [ ] NO    RADIOLOGY & ADDITIONAL TESTS:

## 2025-03-30 NOTE — CHART NOTE - NSCHARTNOTEFT_GEN_A_CORE
Reassessed patient at bedside while on BiPaP with team. Patient appeared more tachypneic, in visible respiratory distress with use of accessory muscles. Decision was made to intubate patient due to above, while already on high BiPAP settings. Patient tolerated intubation procedure without any complications. ET tube placement confirmed with CXR. Reassessed patient at bedside while on BiPaP with team. Patient appeared more tachypneic, in visible respiratory distress with use of accessory muscles, while already on high BiPAP settings. Decision was made to intubate patient due to above. Patient tolerated intubation procedure without any complications. ET tube placement confirmed with CXR.

## 2025-03-30 NOTE — PROGRESS NOTE ADULT - ASSESSMENT
Pt is a 64M from [facility], w/ PMHx of CAD, CHF, DM, CYNTHIA on nocturnal bipap, COPD (multiple intubations in past on 3L of home O2), peripheral neuropathy, GERD, HTN, HLD, obesity, polyarthritis, Pulmonary HTN, TIA, BPH, depression/anxiety p/w respiratory distress from NH. Placed on BiPAP, s/p lasix 80, solumedrol 125 . Admitted for AHRF 2/2 COPD vs. CHF exacerbation.     #AHRF 2/2 COPD exacerbation vs. CHF exacerbation  #COPD  #HFpEF   #CAD  #HTN  #HLD  #CYNTHIA on nocturnal bipap  #Pulm HTN  #BPH  #Depression/Anxiety   #GERD    _________CNS___________  #anxiety/depression  - resume Trazadone, risperidone, alprazolam once off NPO      _________CVS___________  #HFpEF   - not clinically overloaded in the ED, POCUS a-line predominant, grossly preserved systolic fxn   - TTE from 10/2023- EF 50-55%, G1DD    - proBNP 11K  - Trop 145   - EKG: sinus tachycardia, RBBB (compared to prior)  - CXR shows no consolidation, congestion, effusions pending official read   - s/p lasix 80 x1     - monitor I&O, for now will hold lasix until tomorrow if additional diuresis needed given that pt does not appear clinically overloaded   - f/u repeat trop  - f/u TTE     #CAD  - c/w asa, statin once off NPO     #HTN  - not on anti-hypertensives  - continue to monitor    #HLD   - c/w statin once off NPO     _________ RESP__________  #AHRF 2/2 COPD exacerbation vs. CHF exacerbation  #COPD  #CYNTHIA  - s/p lasix 80 and solumedrol 125   - c/w BiPAP  - c/w solumedrol 40q12  - duoneb x3 doses stat, then q6 RTC  - start advair   - f/u repeat ABG after a few hours on BiPAP  - f/u repeat trop  - f/u TTE     __________GI____________  #Nutrition  - NPO on BiPAP    #GERD  - PPI     ________ RENAL__________  #RADHIKA  - baseline .66 in 2024, now 1.4  - likely pre-renal in nature vs. post-renal obstruction/retention related   - mark placed in ED 3/29  - montior Cr post diuresis, strict I&O    #BPH  - mark in place  - resume Flomax and finasteride once off NPO     #Respiratory Acidosis   - ABG 7.21/89/53/36 > 7.23/83/66/35   - in the setting of AHRF,COPD exacerbation  - f/u repeat ABG after trial of BiPAP     _________MSK___________  no active issues     __________ID____________  #SIRS   - pt met SIRS criteria with tachycardia and WC 15   - no obvious source of infection, afebrile, hemodynamically stable   - lactate 1   - monitor off bax  - f/u u/a, ucx if positive  - f/u bcx     _________ENDO__________  #T2IDDM  - on novlog mix 70/30 bid at home, 58u in the AM, 46u in the evening + novolin R sliding scale   - start lantus 30 u at bedtime (decreased dose given NPO status, mod ISSq6 while NPO   - last a1c 9.4, 1/2024, f/u a1c    _______HEME/ONC_______  no active issues     _________SKIN____________  no active issues   Peripheral IVs     ________Prophylaxis_______  #DVT- heparin subq  #GI- ppi      __________GOC/ DISPO___________  FULL CODE, ICU      Pt is a 64M from [facility], w/ PMHx of CAD, CHF, DM, CYNTHIA on nocturnal bipap, COPD (multiple intubations in past on 3L of home O2), peripheral neuropathy, GERD, HTN, HLD, obesity, polyarthritis, Pulmonary HTN, TIA, BPH, depression/anxiety p/w respiratory distress from NH. Placed on BiPAP, s/p lasix 80, solumedrol 125 . Admitted for AHRF 2/2 COPD vs. CHF exacerbation.     #AHRF 2/2 COPD exacerbation vs. CHF exacerbation  #COPD  #HFpEF   #CAD  #HTN  #HLD  #CYNTHIA on nocturnal bipap  #Pulm HTN  #BPH  #Depression/Anxiety   #GERD    _________CNS___________  #intubated, sedated  - copious secretions  - sputum cx collected and sent  - fentanyl, propofol    #anxiety/depression  - resume Trazadone, risperidone, alprazolam once off NPO      _________CVS___________  #HFpEF   - not clinically overloaded in the ED, POCUS a-line predominant, grossly preserved systolic fxn   - TTE from 10/2023- EF 50-55%, G1DD    - proBNP 11K  - Trop 145 > 101  - EKG: sinus tachycardia, RBBB (compared to prior)  - CXR shows no consolidation, congestion, effusions pending official read   - s/p lasix 80 x1  - TTE: technically difficult read  - monitor I&O, for now will hold lasix until tomorrow if additional diuresis needed given that pt does not appear clinically overloaded     #CAD  - c/w asa, statin once off NPO     #HTN  - not on anti-hypertensives  - continue to monitor    #HLD   - c/w statin once off NPO     _________ RESP__________  #AHRF 2/2 COPD exacerbation vs. CHF exacerbation  #COPD  #CYNTHIA  - s/p lasix 80 and solumedrol 125   - c/w solumedrol 40q12  - duoneb x3 doses stat, then q6 RTC  - start advair   - AB.39|65|90|39    __________GI____________  #Nutrition  - Intubated, NG tube placed    #GERD  - PPI     ________ RENAL__________  #RADHIKA  - baseline .66 in , now 1.4  - likely pre-renal in nature vs. post-renal obstruction/retention related   - mark placed in ED 3/29  - montior Cr post diuresis, strict I&O    #BPH  - mark in place  - resume Flomax and finasteride once off NPO     #Respiratory Acidosis   - ABG 7.21/89/53/36 > 7.23/83/66/35 > >> 7.39|65|90|39  - in the setting of AHRF,COPD exacerbation    _________MSK___________  no active issues     __________ID____________  #SIRS   - pt met SIRS criteria with tachycardia and WC 15   - no obvious source of infection, afebrile, hemodynamically stable   - lactate 1   - monitor off bax  - f/u u/a, ucx if positive  - f/u bcx     _________ENDO__________  #T2IDDM  - on novlog mix 70/30 bid at home, 58u in the AM, 46u in the evening + novolin R sliding scale   - start lantus 30 u at bedtime (decreased dose given NPO status, mod ISSq6 while NPO   - last a1c 9.4, 2024, f/u a1c    _______HEME/ONC_______  no active issues     _________SKIN____________  no active issues   Peripheral IVs     ________Prophylaxis_______  #DVT- heparin subq  #GI- ppi      __________GOC/ DISPO___________  FULL CODE, ICU

## 2025-03-31 LAB
ALBUMIN SERPL ELPH-MCNC: 2.9 G/DL — LOW (ref 3.5–5)
ALP SERPL-CCNC: 88 U/L — SIGNIFICANT CHANGE UP (ref 40–120)
ALT FLD-CCNC: 23 U/L DA — SIGNIFICANT CHANGE UP (ref 10–60)
ANION GAP SERPL CALC-SCNC: 4 MMOL/L — LOW (ref 5–17)
AST SERPL-CCNC: 19 U/L — SIGNIFICANT CHANGE UP (ref 10–40)
BASE EXCESS BLDA CALC-SCNC: 11.5 MMOL/L — HIGH (ref -2–3)
BASOPHILS # BLD AUTO: 0.13 K/UL — SIGNIFICANT CHANGE UP (ref 0–0.2)
BASOPHILS NFR BLD AUTO: 0.5 % — SIGNIFICANT CHANGE UP (ref 0–2)
BILIRUB SERPL-MCNC: 0.3 MG/DL — SIGNIFICANT CHANGE UP (ref 0.2–1.2)
BLOOD GAS COMMENTS ARTERIAL: SIGNIFICANT CHANGE UP
BUN SERPL-MCNC: 49 MG/DL — HIGH (ref 7–18)
CALCIUM SERPL-MCNC: 8.8 MG/DL — SIGNIFICANT CHANGE UP (ref 8.4–10.5)
CHLORIDE SERPL-SCNC: 99 MMOL/L — SIGNIFICANT CHANGE UP (ref 96–108)
CO2 SERPL-SCNC: 35 MMOL/L — HIGH (ref 22–31)
CREAT SERPL-MCNC: 1.22 MG/DL — SIGNIFICANT CHANGE UP (ref 0.5–1.3)
EGFR: 67 ML/MIN/1.73M2 — SIGNIFICANT CHANGE UP
EGFR: 67 ML/MIN/1.73M2 — SIGNIFICANT CHANGE UP
EOSINOPHIL # BLD AUTO: 0.01 K/UL — SIGNIFICANT CHANGE UP (ref 0–0.5)
EOSINOPHIL NFR BLD AUTO: 0 % — SIGNIFICANT CHANGE UP (ref 0–6)
GAS PNL BLDA: SIGNIFICANT CHANGE UP
GLUCOSE BLDC GLUCOMTR-MCNC: 245 MG/DL — HIGH (ref 70–99)
GLUCOSE BLDC GLUCOMTR-MCNC: 298 MG/DL — HIGH (ref 70–99)
GLUCOSE BLDC GLUCOMTR-MCNC: 363 MG/DL — HIGH (ref 70–99)
GLUCOSE BLDC GLUCOMTR-MCNC: 376 MG/DL — HIGH (ref 70–99)
GLUCOSE SERPL-MCNC: 273 MG/DL — HIGH (ref 70–99)
GRAM STN FLD: ABNORMAL
HCO3 BLDA-SCNC: 39 MMOL/L — HIGH (ref 21–28)
HCT VFR BLD CALC: 43.1 % — SIGNIFICANT CHANGE UP (ref 39–50)
HGB BLD-MCNC: 13.4 G/DL — SIGNIFICANT CHANGE UP (ref 13–17)
HOROWITZ INDEX BLDA+IHG-RTO: 40 — SIGNIFICANT CHANGE UP
IMM GRANULOCYTES NFR BLD AUTO: 1.1 % — HIGH (ref 0–0.9)
LYMPHOCYTES # BLD AUTO: 14.3 % — SIGNIFICANT CHANGE UP (ref 13–44)
LYMPHOCYTES # BLD AUTO: 4.04 K/UL — HIGH (ref 1–3.3)
MAGNESIUM SERPL-MCNC: 2.5 MG/DL — SIGNIFICANT CHANGE UP (ref 1.6–2.6)
MCHC RBC-ENTMCNC: 27.7 PG — SIGNIFICANT CHANGE UP (ref 27–34)
MCHC RBC-ENTMCNC: 31.1 G/DL — LOW (ref 32–36)
MCV RBC AUTO: 89 FL — SIGNIFICANT CHANGE UP (ref 80–100)
MONOCYTES # BLD AUTO: 4.49 K/UL — HIGH (ref 0–0.9)
MONOCYTES NFR BLD AUTO: 15.9 % — HIGH (ref 2–14)
NEUTROPHILS # BLD AUTO: 19.28 K/UL — HIGH (ref 1.8–7.4)
NEUTROPHILS NFR BLD AUTO: 68.2 % — SIGNIFICANT CHANGE UP (ref 43–77)
NRBC BLD AUTO-RTO: 0 /100 WBCS — SIGNIFICANT CHANGE UP (ref 0–0)
PCO2 BLDA: 61 MMHG — HIGH (ref 35–48)
PH BLDA: 7.41 — SIGNIFICANT CHANGE UP (ref 7.35–7.45)
PHOSPHATE SERPL-MCNC: 3 MG/DL — SIGNIFICANT CHANGE UP (ref 2.5–4.5)
PLATELET # BLD AUTO: 277 K/UL — SIGNIFICANT CHANGE UP (ref 150–400)
PO2 BLDA: 72 MMHG — LOW (ref 83–108)
POTASSIUM SERPL-MCNC: 4.5 MMOL/L — SIGNIFICANT CHANGE UP (ref 3.5–5.3)
POTASSIUM SERPL-SCNC: 4.5 MMOL/L — SIGNIFICANT CHANGE UP (ref 3.5–5.3)
PROT SERPL-MCNC: 7.4 G/DL — SIGNIFICANT CHANGE UP (ref 6–8.3)
RBC # BLD: 4.84 M/UL — SIGNIFICANT CHANGE UP (ref 4.2–5.8)
RBC # FLD: 13.9 % — SIGNIFICANT CHANGE UP (ref 10.3–14.5)
SAO2 % BLDA: 95 % — SIGNIFICANT CHANGE UP
SODIUM SERPL-SCNC: 138 MMOL/L — SIGNIFICANT CHANGE UP (ref 135–145)
SPECIMEN SOURCE: SIGNIFICANT CHANGE UP
WBC # BLD: 28.27 K/UL — HIGH (ref 3.8–10.5)
WBC # FLD AUTO: 28.27 K/UL — HIGH (ref 3.8–10.5)

## 2025-03-31 PROCEDURE — 71045 X-RAY EXAM CHEST 1 VIEW: CPT | Mod: 26

## 2025-03-31 RX ORDER — ACETAMINOPHEN 500 MG/5ML
1000 LIQUID (ML) ORAL ONCE
Refills: 0 | Status: COMPLETED | OUTPATIENT
Start: 2025-03-31 | End: 2025-03-31

## 2025-03-31 RX ORDER — SODIUM CHLORIDE 9 G/1000ML
1000 INJECTION, SOLUTION INTRAVENOUS
Refills: 0 | Status: DISCONTINUED | OUTPATIENT
Start: 2025-03-31 | End: 2025-04-02

## 2025-03-31 RX ORDER — INSULIN LISPRO 100 U/ML
INJECTION, SOLUTION INTRAVENOUS; SUBCUTANEOUS EVERY 6 HOURS
Refills: 0 | Status: DISCONTINUED | OUTPATIENT
Start: 2025-03-31 | End: 2025-03-31

## 2025-03-31 RX ORDER — CEFTRIAXONE 500 MG/1
1000 INJECTION, POWDER, FOR SOLUTION INTRAMUSCULAR; INTRAVENOUS ONCE
Refills: 0 | Status: COMPLETED | OUTPATIENT
Start: 2025-03-31 | End: 2025-03-31

## 2025-03-31 RX ORDER — GLUCAGON 3 MG/1
1 POWDER NASAL ONCE
Refills: 0 | Status: DISCONTINUED | OUTPATIENT
Start: 2025-03-31 | End: 2025-04-02

## 2025-03-31 RX ORDER — DEXTROSE 50 % IN WATER 50 %
25 SYRINGE (ML) INTRAVENOUS ONCE
Refills: 0 | Status: DISCONTINUED | OUTPATIENT
Start: 2025-03-31 | End: 2025-04-02

## 2025-03-31 RX ORDER — DEXTROSE 50 % IN WATER 50 %
12.5 SYRINGE (ML) INTRAVENOUS ONCE
Refills: 0 | Status: DISCONTINUED | OUTPATIENT
Start: 2025-03-31 | End: 2025-04-02

## 2025-03-31 RX ORDER — HEPARIN SODIUM 1000 [USP'U]/ML
5000 INJECTION INTRAVENOUS; SUBCUTANEOUS EVERY 8 HOURS
Refills: 0 | Status: DISCONTINUED | OUTPATIENT
Start: 2025-03-31 | End: 2025-04-05

## 2025-03-31 RX ORDER — CEFTRIAXONE 500 MG/1
2000 INJECTION, POWDER, FOR SOLUTION INTRAMUSCULAR; INTRAVENOUS EVERY 24 HOURS
Refills: 0 | Status: COMPLETED | OUTPATIENT
Start: 2025-04-01 | End: 2025-04-05

## 2025-03-31 RX ORDER — DEXTROSE 50 % IN WATER 50 %
15 SYRINGE (ML) INTRAVENOUS ONCE
Refills: 0 | Status: DISCONTINUED | OUTPATIENT
Start: 2025-03-31 | End: 2025-04-02

## 2025-03-31 RX ADMIN — INSULIN LISPRO 6: 100 INJECTION, SOLUTION INTRAVENOUS; SUBCUTANEOUS at 23:36

## 2025-03-31 RX ADMIN — Medication 1 DOSE(S): at 11:43

## 2025-03-31 RX ADMIN — LATANOPROST PF 1 DROP(S): 0.05 SOLUTION/ DROPS OPHTHALMIC at 21:03

## 2025-03-31 RX ADMIN — INSULIN LISPRO 10: 100 INJECTION, SOLUTION INTRAVENOUS; SUBCUTANEOUS at 17:25

## 2025-03-31 RX ADMIN — IPRATROPIUM BROMIDE AND ALBUTEROL SULFATE 3 MILLILITER(S): .5; 2.5 SOLUTION RESPIRATORY (INHALATION) at 02:41

## 2025-03-31 RX ADMIN — Medication 1 APPLICATION(S): at 05:05

## 2025-03-31 RX ADMIN — IPRATROPIUM BROMIDE AND ALBUTEROL SULFATE 3 MILLILITER(S): .5; 2.5 SOLUTION RESPIRATORY (INHALATION) at 08:39

## 2025-03-31 RX ADMIN — NOREPINEPHRINE BITARTRATE 9.03 MICROGRAM(S)/KG/MIN: 8 SOLUTION at 08:21

## 2025-03-31 RX ADMIN — Medication 1 DROP(S): at 05:05

## 2025-03-31 RX ADMIN — Medication 1000 MILLIGRAM(S): at 22:00

## 2025-03-31 RX ADMIN — Medication 400 MILLIGRAM(S): at 21:02

## 2025-03-31 RX ADMIN — Medication 1000 MILLIGRAM(S): at 03:38

## 2025-03-31 RX ADMIN — INSULIN LISPRO 10: 100 INJECTION, SOLUTION INTRAVENOUS; SUBCUTANEOUS at 11:43

## 2025-03-31 RX ADMIN — IPRATROPIUM BROMIDE AND ALBUTEROL SULFATE 3 MILLILITER(S): .5; 2.5 SOLUTION RESPIRATORY (INHALATION) at 20:22

## 2025-03-31 RX ADMIN — PROPOFOL 11.6 MICROGRAM(S)/KG/MIN: 10 INJECTION, EMULSION INTRAVENOUS at 01:50

## 2025-03-31 RX ADMIN — Medication 40 MILLIGRAM(S): at 11:43

## 2025-03-31 RX ADMIN — CEFTRIAXONE 100 MILLIGRAM(S): 500 INJECTION, POWDER, FOR SOLUTION INTRAMUSCULAR; INTRAVENOUS at 12:08

## 2025-03-31 RX ADMIN — Medication 400 MILLIGRAM(S): at 03:28

## 2025-03-31 RX ADMIN — Medication 15 MILLILITER(S): at 17:26

## 2025-03-31 RX ADMIN — PROPOFOL 11.6 MICROGRAM(S)/KG/MIN: 10 INJECTION, EMULSION INTRAVENOUS at 23:36

## 2025-03-31 RX ADMIN — INSULIN LISPRO 4: 100 INJECTION, SOLUTION INTRAVENOUS; SUBCUTANEOUS at 06:10

## 2025-03-31 RX ADMIN — PROPOFOL 11.6 MICROGRAM(S)/KG/MIN: 10 INJECTION, EMULSION INTRAVENOUS at 17:25

## 2025-03-31 RX ADMIN — Medication 1 DROP(S): at 17:26

## 2025-03-31 RX ADMIN — Medication 14.4 MICROGRAM(S)/KG/HR: at 13:03

## 2025-03-31 RX ADMIN — METHYLPREDNISOLONE ACETATE 40 MILLIGRAM(S): 80 INJECTION, SUSPENSION INTRA-ARTICULAR; INTRALESIONAL; INTRAMUSCULAR; SOFT TISSUE at 17:25

## 2025-03-31 RX ADMIN — PROPOFOL 11.6 MICROGRAM(S)/KG/MIN: 10 INJECTION, EMULSION INTRAVENOUS at 07:37

## 2025-03-31 RX ADMIN — METHYLPREDNISOLONE ACETATE 40 MILLIGRAM(S): 80 INJECTION, SUSPENSION INTRA-ARTICULAR; INTRALESIONAL; INTRAMUSCULAR; SOFT TISSUE at 05:05

## 2025-03-31 RX ADMIN — PROPOFOL 11.6 MICROGRAM(S)/KG/MIN: 10 INJECTION, EMULSION INTRAVENOUS at 05:04

## 2025-03-31 RX ADMIN — IPRATROPIUM BROMIDE AND ALBUTEROL SULFATE 3 MILLILITER(S): .5; 2.5 SOLUTION RESPIRATORY (INHALATION) at 14:25

## 2025-03-31 RX ADMIN — Medication 15 MILLILITER(S): at 05:05

## 2025-03-31 RX ADMIN — HEPARIN SODIUM 5000 UNIT(S): 1000 INJECTION INTRAVENOUS; SUBCUTANEOUS at 21:03

## 2025-03-31 NOTE — DIETITIAN INITIAL EVALUATION ADULT - ENTERAL
If TF with continued Propofol, Glucerna 1.5 @ 50 ml/h x 18h (900 ml, 1348 kcal, 74 g protein, 683 ml water at goal) MD to adjust free water as needed; Add Prosource 30ml x bid daily as medically feasible (extra 120 kcal, 30 g protein daily)   If TF without Propofol, Goal: Glucerna 1.5 @ 65ml/h x 18h (1170 ml, 1750 kcal, 96 g protein, 886 ml water at goal); Add Prosource 30ml daily as medically feasible (extra 60kcal, 15g protein daily)  If TF with continued Propofol, Glucerna 1.5 @ 50 ml/h x 18h (900 ml, 1348 kcal, 74 g protein, 683 ml water at goal) MD to adjust free water as needed; Add Prosource 30ml x bid daily as medically feasible (extra 120 kcal, 30 g protein daily)   If TF without Propofol, Goal: Glucerna 1.5 @ 65ml/h x 18h (1170 ml, 1750 kcal, 96 g protein, 886 ml water at goal)

## 2025-03-31 NOTE — DIETITIAN INITIAL EVALUATION ADULT - PERTINENT LABORATORY DATA
03-31    138  |  99  |  49[H]  ----------------------------<  273[H]  4.5   |  35[H]  |  1.22    Ca    8.8      31 Mar 2025 03:24  Phos  3.0     03-31  Mg     2.5     03-31    TPro  7.4  /  Alb  2.9[L]  /  TBili  0.3  /  DBili  x   /  AST  19  /  ALT  23  /  AlkPhos  88  03-31  POCT Blood Glucose.: 245 mg/dL (03-31-25 @ 05:39)  A1C with Estimated Average Glucose Result: 11.3 % (03-30-25 @ 04:01)

## 2025-03-31 NOTE — DIETITIAN INITIAL EVALUATION ADULT - ETIOLOGY
impaired glucose metabolism with uncontrolled DM  acute hypoxic respiratory failure with intubation; cognitive deficit

## 2025-03-31 NOTE — DIETITIAN INITIAL EVALUATION ADULT - OTHER INFO
Pt from skilled nursing facility, in ICU, sedated on Vent support, Propofol at 23.1 ml/hr ( 361.2 ml or 397.3 kcal over past 24h); Limited intake history data available at present; h/o DM, POCT Glucose=417 to 245, Glucose=378-->273, RhqD7N=86.3; NPO x 2-3d; Unknown food allergies; Wt data in EMR: 209.4 lb 1/22/24; 212 lb 3/29/25

## 2025-03-31 NOTE — PROGRESS NOTE ADULT - SUBJECTIVE AND OBJECTIVE BOX
INTERVAL HPI/OVERNIGHT EVENTS: Intubated yesterday. Today able to be awoken, responsive to questions, some commands.     PRESSORS: [ ] YES [X] NO  WHICH:    Antimicrobial:    Cardiovascular:  norepinephrine Infusion 0.05 MICROgram(s)/kG/Min IV Continuous <Continuous>    Pulmonary:  albuterol/ipratropium for Nebulization 3 milliLiter(s) Nebulizer every 30 minutes  albuterol/ipratropium for Nebulization 3 milliLiter(s) Nebulizer every 6 hours  fluticasone propionate/ salmeterol 100-50 MICROgram(s) Diskus 1 Dose(s) Inhalation two times a day    Hematalogic:    Other:  artificial  tears Solution 1 Drop(s) Both EYES two times a day  chlorhexidine 0.12% Liquid 15 milliLiter(s) Oral Mucosa every 12 hours  chlorhexidine 2% Cloths 1 Application(s) Topical <User Schedule>  fentaNYL   Infusion 1.5 MICROgram(s)/kG/Hr IV Continuous <Continuous>  insulin glargine Injectable (LANTUS) 45 Unit(s) SubCutaneous at bedtime  insulin lispro (ADMELOG) corrective regimen sliding scale   SubCutaneous every 6 hours  latanoprost 0.005% Ophthalmic Solution 1 Drop(s) Both EYES at bedtime  methylPREDNISolone sodium succinate Injectable 40 milliGRAM(s) IV Push every 12 hours  pantoprazole  Injectable 40 milliGRAM(s) IV Push daily  propofol Infusion 20 MICROgram(s)/kG/Min IV Continuous <Continuous>    albuterol/ipratropium for Nebulization 3 milliLiter(s) Nebulizer every 30 minutes  albuterol/ipratropium for Nebulization 3 milliLiter(s) Nebulizer every 6 hours  artificial  tears Solution 1 Drop(s) Both EYES two times a day  chlorhexidine 0.12% Liquid 15 milliLiter(s) Oral Mucosa every 12 hours  chlorhexidine 2% Cloths 1 Application(s) Topical <User Schedule>  fentaNYL   Infusion 1.5 MICROgram(s)/kG/Hr IV Continuous <Continuous>  fluticasone propionate/ salmeterol 100-50 MICROgram(s) Diskus 1 Dose(s) Inhalation two times a day  insulin glargine Injectable (LANTUS) 45 Unit(s) SubCutaneous at bedtime  insulin lispro (ADMELOG) corrective regimen sliding scale   SubCutaneous every 6 hours  latanoprost 0.005% Ophthalmic Solution 1 Drop(s) Both EYES at bedtime  methylPREDNISolone sodium succinate Injectable 40 milliGRAM(s) IV Push every 12 hours  norepinephrine Infusion 0.05 MICROgram(s)/kG/Min IV Continuous <Continuous>  pantoprazole  Injectable 40 milliGRAM(s) IV Push daily  propofol Infusion 20 MICROgram(s)/kG/Min IV Continuous <Continuous>    Drug Dosing Weight  Height (cm): 172.7 (29 Mar 2025 16:46)  Weight (kg): 96.3 (29 Mar 2025 16:46)  BMI (kg/m2): 32.3 (29 Mar 2025 16:46)  BSA (m2): 2.1 (29 Mar 2025 16:46)    CENTRAL LINE: [ ] YES [X] NO  LOCATION:   DATE INSERTED:  REMOVE: [ ] YES [ ] NO  EXPLAIN:    AMOR: [X] YES [ ] NO    DATE INSERTED:  REMOVE:  [ ] YES [ ] NO  EXPLAIN:    A-LINE:  [ ] YES [X] NO  LOCATION:   DATE INSERTED:  REMOVE:  [ ] YES [ ] NO  EXPLAIN:    PMH -reviewed admission note, no change since admission  PAST MEDICAL & SURGICAL HISTORY:  COPD (chronic obstructive pulmonary disease)  Oxygen 2-3L at home      Stented coronary artery  2017      HLD (hyperlipidemia)      Pulmonary HTN      Type 2 diabetes mellitus without complication, with long-term current use of insulin      DM (diabetes mellitus)      CAD (coronary artery disease)      GERD (gastroesophageal reflux disease)      Insomnia      CHF (congestive heart failure)      HTN (hypertension)      Mood disorder      COPD (chronic obstructive pulmonary disease)      HTN (hypertension)      DM (diabetes mellitus)      CAD (coronary artery disease)      Bipolar disorder      CYNTHIA treated with BiPAP      No significant past surgical history          ICU Vital Signs Last 24 Hrs  T(C): 37.9 (31 Mar 2025 12:00), Max: 39.5 (30 Mar 2025 20:00)  T(F): 100.2 (31 Mar 2025 12:00), Max: 103.1 (30 Mar 2025 20:00)  HR: 87 (31 Mar 2025 12:00) (83 - 122)  BP: 95/64 (31 Mar 2025 12:00) (72/58 - 142/86)  BP(mean): 72 (31 Mar 2025 12:00) (64 - 120)  ABP: --  ABP(mean): --  RR: 18 (31 Mar 2025 12:00) (12 - 41)  SpO2: 97% (31 Mar 2025 12:00) (90% - 100%)    O2 Parameters below as of 31 Mar 2025 02:00  Patient On (Oxygen Delivery Method): ventilator    O2 Concentration (%): 40        ABG - ( 31 Mar 2025 11:47 )  pH, Arterial: 7.36  pH, Blood: x     /  pCO2: 65    /  pO2: 72    / HCO3: 37    / Base Excess: 8.8   /  SaO2: 95                    03-30 @ 07:01  -  03-31 @ 07:00  --------------------------------------------------------  IN: 939.6 mL / OUT: 1450 mL / NET: -510.4 mL        Mode: AC/ CMV (Assist Control/ Continuous Mandatory Ventilation)  RR (machine): 18  TV (machine): 420  FiO2: 50  PEEP: 6  ITime: 1  MAP: 10  PIP: 21      PHYSICAL EXAM:    GENERAL: Awake, intubated  HEAD:  Atraumatic, Normocephalic  EYES: EOMI, PERRLA, conjunctiva and sclera clear  ENMT: No tonsillar erythema, exudates, or enlargement; Decreased secretions compared to prior  NECK: Supple, No JVD, Normal thyroid  HEART: Regular rate and rhythm; No murmurs, rubs, or gallops  RESPIRATORY: Lung sounds difficult to appreciate due to body habitus, decreased breath sounds b/l, some crackles  ABDOMEN: Soft, Nontender, Nondistended; Bowel sounds present  NEUROLOGY: Intubated  EXTREMITIES:  2+ Peripheral Pulses, No clubbing, cyanosis, or edema  SKIN: warm, dry, normal color, no rash or abnormal lesions      LABS:  CBC Full  -  ( 31 Mar 2025 03:24 )  WBC Count : 28.27 K/uL  RBC Count : 4.84 M/uL  Hemoglobin : 13.4 g/dL  Hematocrit : 43.1 %  Platelet Count - Automated : 277 K/uL  Mean Cell Volume : 89.0 fl  Mean Cell Hemoglobin : 27.7 pg  Mean Cell Hemoglobin Concentration : 31.1 g/dL  Auto Neutrophil # : x  Auto Lymphocyte # : x  Auto Monocyte # : x  Auto Eosinophil # : x  Auto Basophil # : x  Auto Neutrophil % : x  Auto Lymphocyte % : x  Auto Monocyte % : x  Auto Eosinophil % : x  Auto Basophil % : x    03-31    138  |  99  |  49[H]  ----------------------------<  273[H]  4.5   |  35[H]  |  1.22    Ca    8.8      31 Mar 2025 03:24  Phos  3.0     03-31  Mg     2.5     03-31    TPro  7.4  /  Alb  2.9[L]  /  TBili  0.3  /  DBili  x   /  AST  19  /  ALT  23  /  AlkPhos  88  03-31      Urinalysis Basic - ( 31 Mar 2025 03:24 )    Color: x / Appearance: x / SG: x / pH: x  Gluc: 273 mg/dL / Ketone: x  / Bili: x / Urobili: x   Blood: x / Protein: x / Nitrite: x   Leuk Esterase: x / RBC: x / WBC x   Sq Epi: x / Non Sq Epi: x / Bacteria: x      Culture Results:   >100,000 CFU/ml Candida albicans "Susceptibilities not performed" (03-29 @ 15:40)  Culture Results:   No growth at 24 hours (03-29 @ 12:10)  Culture Results:   No growth at 24 hours (03-29 @ 12:00)      RADIOLOGY & ADDITIONAL STUDIES REVIEWED:  ***    [ ]GOALS OF CARE DISCUSSION WITH PATIENT/FAMILY/PROXY:    CRITICAL CARE TIME SPENT: 35 minutes

## 2025-03-31 NOTE — PROGRESS NOTE ADULT - ASSESSMENT
· Assessment	  Pt is a 64M from [facility], w/ PMHx of CAD, CHF, DM, CYNTHIA on nocturnal bipap, COPD (multiple intubations in past on 3L of home O2), peripheral neuropathy, GERD, HTN, HLD, obesity, polyarthritis, Pulmonary HTN, TIA, BPH, depression/anxiety p/w respiratory distress from NH. Placed on BiPAP, s/p lasix 80, solumedrol 125 . Admitted for AHRF 2/2 COPD vs. CHF exacerbation.     #AHRF 2/2 COPD exacerbation vs. CHF exacerbation  #COPD  #HFpEF   #CAD  #HTN  #HLD  #CYNTHIA on nocturnal bipap  #Pulm HTN  #BPH  #Depression/Anxiety   #GERD    _________CNS___________  #intubated, sedated  - copious secretions  - sputum cx collected and sent  - fentanyl 1.5,  propofol    #anxiety/depression  - resume Trazadone, risperidone, alprazolam once off NPO      _________CVS___________  #HFpEF   - not clinically overloaded in the ED, POCUS a-line predominant, grossly preserved systolic fxn   - TTE from 10/2023- EF 50-55%, G1DD    - proBNP 11K  - Trop 145 > 101  - EKG: sinus tachycardia, RBBB (compared to prior)  - CXR shows no consolidation, congestion, effusions pending official read   - s/p lasix 80 x1  - TTE: technically difficult read  - monitor I&O, for now will hold lasix fow now, if additional diuresis needed given that pt does not appear clinically overloaded     #CAD  - c/w asa, statin once off NPO     #HTN  - not on anti-hypertensives  - continue to monitor    #HLD   - c/w statin once off NPO     _________ RESP__________  #AHRF 2/2 COPD exacerbation vs. CHF exacerbation  #COPD  #CYNTHIA  - s/p lasix 80 and solumedrol 125   - c/w solumedrol 40q12  - duoneb x3 doses stat, then q6 RTC  - start advair   - AB.39|65|90|39    #Possible PNA  - Episodic fevers  - Increased secretions yesterday prior to intubation, possible aspiration event  - Increase ceftriaxone 2 g qd    __________GI____________  #Nutrition  - Intubated, NG tube placed    #GERD  - PPI     ________ RENAL__________  #RADHIKA  - baseline .66 in , now 1.4  - likely pre-renal in nature vs. post-renal obstruction/retention related   - mark placed in ED 3/29  - montior Cr post diuresis, strict I&O    #BPH  - mark in place  - resume Flomax and finasteride once off NPO     #Respiratory Acidosis   - ABG 7.21/89/53/36 > 7.23/83/66/35 > >> 7.39|65|90|39  - in the setting of AHRF,COPD exacerbation    _________MSK___________  no active issues     __________ID____________  #SIRS   - pt met SIRS criteria with tachycardia and WC 15 in ED  - episodic fevers, TMax   - lactate 1   - monitor off bax  - f/u u/a, ucx if positive  - f/u bcx     _________ENDO__________  #T2IDDM  - on novlog mix 70/30 bid at home, 58u in the AM, 46u in the evening + novolin R sliding scale   - start lantus 30 u at bedtime (decreased dose given NPO status, mod ISSq6 while NPO   - last a1c 9.4, 2024, f/u a1c    _______HEME/ONC_______  no active issues     _________SKIN____________  no active issues   Peripheral IVs     ________Prophylaxis_______  #DVT- heparin subq  #GI- ppi      __________GOC/ DISPO___________  FULL CODE, ICU    · Assessment	  Pt is a 64M from [facility], w/ PMHx of CAD, CHF, DM, CYNTHIA on nocturnal bipap, COPD (multiple intubations in past on 3L of home O2), peripheral neuropathy, GERD, HTN, HLD, obesity, polyarthritis, Pulmonary HTN, TIA, BPH, depression/anxiety p/w respiratory distress from NH. Placed on BiPAP, s/p lasix 80, solumedrol 125 . Admitted for AHRF 2/2 COPD vs. CHF exacerbation.     #AHRF 2/2 COPD exacerbation vs. CHF exacerbation  #COPD  #HFpEF   #CAD  #HTN  #HLD  #CYNTHIA on nocturnal bipap  #Pulm HTN  #BPH  #Depression/Anxiety   #GERD    _________CNS___________  #intubated, sedated  - copious secretions  - sputum cx collected and sent  - fentanyl 1.5,  propofol    #anxiety/depression  - resume Trazadone, risperidone, alprazolam once off NPO      _________CVS___________  #HFpEF   - not clinically overloaded in the ED, POCUS a-line predominant, grossly preserved systolic fxn   - TTE from 10/2023- EF 50-55%, G1DD    - proBNP 11K  - Trop 145 > 101  - EKG: sinus tachycardia, RBBB (compared to prior)  - CXR shows no consolidation, congestion, effusions pending official read   - s/p lasix 80 x1  - TTE: technically difficult read  - monitor I&O, for now will hold lasix fow now, if additional diuresis needed given that pt does not appear clinically overloaded     #CAD  - c/w asa, statin once off NPO     #HTN  - not on anti-hypertensives  - continue to monitor    #HLD   - c/w statin once off NPO     _________ RESP__________  #AHRF 2/2 COPD exacerbation vs. CHF exacerbation  #COPD  #CYNTHIA  - s/p lasix 80 and solumedrol 125   - c/w solumedrol 40q12  - duoneb x3 doses stat, then q6 RTC  - start advair   - AB.39|65|90|39    #Possible PNA  - Episodic fevers  - Increased secretions yesterday prior to intubation, possible aspiration event  - Increase ceftriaxone 2 g qd    __________GI____________  #Nutrition  - Intubated, NG tube placed    #GERD  - PPI     ________ RENAL__________  #RADHIKA  - baseline .66 in , now 1.4  - likely pre-renal in nature vs. post-renal obstruction/retention related   - mark placed in ED 3/29  - montior Cr post diuresis, strict I&O    #BPH  - mark in place  - resume Flomax and finasteride once off NPO     #Respiratory Acidosis   - ABG 7.21//53/36 > 7.23/83/66/35 > >> 7.39|65|90|39  - in the setting of AHRF,COPD exacerbation    _________MSK___________  no active issues     __________ID____________  #PNA  - pt met SIRS criteria with tachycardia and WC 15 in ED  - episodic fevers, TMax 101, increased WBC 28  - lactate 1   - UA neg    - c/w ctx 2 g qd  - f/u bcx     _________ENDO__________  #T2IDDM  - on novlog mix 70/30 bid at home, 58u in the AM, 46u in the evening + novolin R sliding scale   - Lantus 45 u at bedtime (originally decreased dose to 30 given NPO status, mod ISSq6 while NPO   - last a1c 9.4, 2024, most recent A1c 11.3     _______HEME/ONC_______  no active issues     _________SKIN____________  no active issues   Peripheral IVs     ________Prophylaxis_______  #DVT- heparin subq  #GI- ppi      __________GOC/ DISPO___________  FULL CODE, ICU

## 2025-03-31 NOTE — DIETITIAN INITIAL EVALUATION ADULT - PERTINENT MEDS FT
MEDICATIONS  (STANDING):  albuterol/ipratropium for Nebulization 3 milliLiter(s) Nebulizer every 30 minutes  albuterol/ipratropium for Nebulization 3 milliLiter(s) Nebulizer every 6 hours  artificial  tears Solution 1 Drop(s) Both EYES two times a day  cefTRIAXone   IVPB 1000 milliGRAM(s) IV Intermittent every 24 hours  cefTRIAXone   IVPB      chlorhexidine 0.12% Liquid 15 milliLiter(s) Oral Mucosa every 12 hours  chlorhexidine 2% Cloths 1 Application(s) Topical <User Schedule>  fentaNYL   Infusion 1 MICROgram(s)/kG/Hr (9.63 mL/Hr) IV Continuous <Continuous>  fluticasone propionate/ salmeterol 100-50 MICROgram(s) Diskus 1 Dose(s) Inhalation two times a day  insulin glargine Injectable (LANTUS) 45 Unit(s) SubCutaneous at bedtime  insulin lispro (ADMELOG) corrective regimen sliding scale   SubCutaneous every 6 hours  latanoprost 0.005% Ophthalmic Solution 1 Drop(s) Both EYES at bedtime  methylPREDNISolone sodium succinate Injectable 40 milliGRAM(s) IV Push every 12 hours  norepinephrine Infusion 0.05 MICROgram(s)/kG/Min (9.03 mL/Hr) IV Continuous <Continuous>  pantoprazole  Injectable 40 milliGRAM(s) IV Push daily  propofol Infusion 20 MICROgram(s)/kG/Min (11.6 mL/Hr) IV Continuous <Continuous>    MEDICATIONS  (PRN):

## 2025-04-01 LAB
ALBUMIN SERPL ELPH-MCNC: 2.7 G/DL — LOW (ref 3.5–5)
ALP SERPL-CCNC: 75 U/L — SIGNIFICANT CHANGE UP (ref 40–120)
ALT FLD-CCNC: 22 U/L DA — SIGNIFICANT CHANGE UP (ref 10–60)
ANION GAP SERPL CALC-SCNC: 1 MMOL/L — LOW (ref 5–17)
ANION GAP SERPL CALC-SCNC: 1 MMOL/L — LOW (ref 5–17)
ANION GAP SERPL CALC-SCNC: 3 MMOL/L — LOW (ref 5–17)
AST SERPL-CCNC: 27 U/L — SIGNIFICANT CHANGE UP (ref 10–40)
BASOPHILS # BLD AUTO: 0.04 K/UL — SIGNIFICANT CHANGE UP (ref 0–0.2)
BASOPHILS NFR BLD AUTO: 0.2 % — SIGNIFICANT CHANGE UP (ref 0–2)
BILIRUB SERPL-MCNC: 0.3 MG/DL — SIGNIFICANT CHANGE UP (ref 0.2–1.2)
BUN SERPL-MCNC: 39 MG/DL — HIGH (ref 7–18)
BUN SERPL-MCNC: 40 MG/DL — HIGH (ref 7–18)
BUN SERPL-MCNC: 43 MG/DL — HIGH (ref 7–18)
CALCIUM SERPL-MCNC: 7.9 MG/DL — LOW (ref 8.4–10.5)
CALCIUM SERPL-MCNC: 8.2 MG/DL — LOW (ref 8.4–10.5)
CALCIUM SERPL-MCNC: 8.3 MG/DL — LOW (ref 8.4–10.5)
CHLORIDE SERPL-SCNC: 100 MMOL/L — SIGNIFICANT CHANGE UP (ref 96–108)
CHLORIDE SERPL-SCNC: 100 MMOL/L — SIGNIFICANT CHANGE UP (ref 96–108)
CHLORIDE SERPL-SCNC: 103 MMOL/L — SIGNIFICANT CHANGE UP (ref 96–108)
CO2 SERPL-SCNC: 33 MMOL/L — HIGH (ref 22–31)
CO2 SERPL-SCNC: 34 MMOL/L — HIGH (ref 22–31)
CO2 SERPL-SCNC: 36 MMOL/L — HIGH (ref 22–31)
CREAT SERPL-MCNC: 1 MG/DL — SIGNIFICANT CHANGE UP (ref 0.5–1.3)
CREAT SERPL-MCNC: 1.06 MG/DL — SIGNIFICANT CHANGE UP (ref 0.5–1.3)
CREAT SERPL-MCNC: 1.12 MG/DL — SIGNIFICANT CHANGE UP (ref 0.5–1.3)
CULTURE RESULTS: ABNORMAL
EGFR: 74 ML/MIN/1.73M2 — SIGNIFICANT CHANGE UP
EGFR: 74 ML/MIN/1.73M2 — SIGNIFICANT CHANGE UP
EGFR: 79 ML/MIN/1.73M2 — SIGNIFICANT CHANGE UP
EGFR: 79 ML/MIN/1.73M2 — SIGNIFICANT CHANGE UP
EGFR: 85 ML/MIN/1.73M2 — SIGNIFICANT CHANGE UP
EGFR: 85 ML/MIN/1.73M2 — SIGNIFICANT CHANGE UP
EOSINOPHIL # BLD AUTO: 0.01 K/UL — SIGNIFICANT CHANGE UP (ref 0–0.5)
EOSINOPHIL NFR BLD AUTO: 0.1 % — SIGNIFICANT CHANGE UP (ref 0–6)
GLUCOSE BLDC GLUCOMTR-MCNC: 279 MG/DL — HIGH (ref 70–99)
GLUCOSE BLDC GLUCOMTR-MCNC: 310 MG/DL — HIGH (ref 70–99)
GLUCOSE BLDC GLUCOMTR-MCNC: 359 MG/DL — HIGH (ref 70–99)
GLUCOSE BLDC GLUCOMTR-MCNC: 363 MG/DL — HIGH (ref 70–99)
GLUCOSE BLDC GLUCOMTR-MCNC: 387 MG/DL — HIGH (ref 70–99)
GLUCOSE SERPL-MCNC: 344 MG/DL — HIGH (ref 70–99)
GLUCOSE SERPL-MCNC: 400 MG/DL — HIGH (ref 70–99)
GLUCOSE SERPL-MCNC: 430 MG/DL — HIGH (ref 70–99)
HCT VFR BLD CALC: 40.3 % — SIGNIFICANT CHANGE UP (ref 39–50)
HGB BLD-MCNC: 12.5 G/DL — LOW (ref 13–17)
IMM GRANULOCYTES NFR BLD AUTO: 1 % — HIGH (ref 0–0.9)
LYMPHOCYTES # BLD AUTO: 15.3 % — SIGNIFICANT CHANGE UP (ref 13–44)
LYMPHOCYTES # BLD AUTO: 2.57 K/UL — SIGNIFICANT CHANGE UP (ref 1–3.3)
MAGNESIUM SERPL-MCNC: 2.9 MG/DL — HIGH (ref 1.6–2.6)
MCHC RBC-ENTMCNC: 27.8 PG — SIGNIFICANT CHANGE UP (ref 27–34)
MCHC RBC-ENTMCNC: 31 G/DL — LOW (ref 32–36)
MCV RBC AUTO: 89.8 FL — SIGNIFICANT CHANGE UP (ref 80–100)
MONOCYTES # BLD AUTO: 1.7 K/UL — HIGH (ref 0–0.9)
MONOCYTES NFR BLD AUTO: 10.1 % — SIGNIFICANT CHANGE UP (ref 2–14)
NEUTROPHILS # BLD AUTO: 12.29 K/UL — HIGH (ref 1.8–7.4)
NEUTROPHILS NFR BLD AUTO: 73.3 % — SIGNIFICANT CHANGE UP (ref 43–77)
NRBC BLD AUTO-RTO: 0 /100 WBCS — SIGNIFICANT CHANGE UP (ref 0–0)
PHOSPHATE SERPL-MCNC: 2.9 MG/DL — SIGNIFICANT CHANGE UP (ref 2.5–4.5)
PLATELET # BLD AUTO: 253 K/UL — SIGNIFICANT CHANGE UP (ref 150–400)
POTASSIUM SERPL-MCNC: 4.5 MMOL/L — SIGNIFICANT CHANGE UP (ref 3.5–5.3)
POTASSIUM SERPL-MCNC: 5.1 MMOL/L — SIGNIFICANT CHANGE UP (ref 3.5–5.3)
POTASSIUM SERPL-MCNC: 5.5 MMOL/L — HIGH (ref 3.5–5.3)
POTASSIUM SERPL-SCNC: 4.5 MMOL/L — SIGNIFICANT CHANGE UP (ref 3.5–5.3)
POTASSIUM SERPL-SCNC: 5.1 MMOL/L — SIGNIFICANT CHANGE UP (ref 3.5–5.3)
POTASSIUM SERPL-SCNC: 5.5 MMOL/L — HIGH (ref 3.5–5.3)
PROT SERPL-MCNC: 7.1 G/DL — SIGNIFICANT CHANGE UP (ref 6–8.3)
RBC # BLD: 4.49 M/UL — SIGNIFICANT CHANGE UP (ref 4.2–5.8)
RBC # FLD: 14 % — SIGNIFICANT CHANGE UP (ref 10.3–14.5)
SODIUM SERPL-SCNC: 135 MMOL/L — SIGNIFICANT CHANGE UP (ref 135–145)
SODIUM SERPL-SCNC: 137 MMOL/L — SIGNIFICANT CHANGE UP (ref 135–145)
SODIUM SERPL-SCNC: 139 MMOL/L — SIGNIFICANT CHANGE UP (ref 135–145)
SPECIMEN SOURCE: SIGNIFICANT CHANGE UP
TROPONIN I, HIGH SENSITIVITY RESULT: 63.7 NG/L — SIGNIFICANT CHANGE UP
WBC # BLD: 16.78 K/UL — HIGH (ref 3.8–10.5)
WBC # FLD AUTO: 16.78 K/UL — HIGH (ref 3.8–10.5)

## 2025-04-01 PROCEDURE — 71045 X-RAY EXAM CHEST 1 VIEW: CPT | Mod: 26

## 2025-04-01 PROCEDURE — 71045 X-RAY EXAM CHEST 1 VIEW: CPT | Mod: 26,77

## 2025-04-01 RX ORDER — INSULIN LISPRO 100 U/ML
INJECTION, SOLUTION INTRAVENOUS; SUBCUTANEOUS
Refills: 0 | Status: DISCONTINUED | OUTPATIENT
Start: 2025-04-01 | End: 2025-04-02

## 2025-04-01 RX ORDER — HALOPERIDOL 10 MG/1
2 TABLET ORAL ONCE
Refills: 0 | Status: COMPLETED | OUTPATIENT
Start: 2025-04-01 | End: 2025-04-01

## 2025-04-01 RX ORDER — INSULIN GLARGINE-YFGN 100 [IU]/ML
45 INJECTION, SOLUTION SUBCUTANEOUS AT BEDTIME
Refills: 0 | Status: DISCONTINUED | OUTPATIENT
Start: 2025-04-01 | End: 2025-04-02

## 2025-04-01 RX ORDER — MIDAZOLAM IN 0.9 % SOD.CHLORID 1 MG/ML
2 PLASTIC BAG, INJECTION (ML) INTRAVENOUS ONCE
Refills: 0 | Status: DISCONTINUED | OUTPATIENT
Start: 2025-04-01 | End: 2025-04-01

## 2025-04-01 RX ORDER — METHYLPREDNISOLONE ACETATE 80 MG/ML
40 INJECTION, SUSPENSION INTRA-ARTICULAR; INTRALESIONAL; INTRAMUSCULAR; SOFT TISSUE DAILY
Refills: 0 | Status: DISCONTINUED | OUTPATIENT
Start: 2025-04-02 | End: 2025-04-07

## 2025-04-01 RX ORDER — ACETAMINOPHEN 500 MG/5ML
1000 LIQUID (ML) ORAL ONCE
Refills: 0 | Status: COMPLETED | OUTPATIENT
Start: 2025-04-01 | End: 2025-04-01

## 2025-04-01 RX ORDER — AZITHROMYCIN 250 MG
CAPSULE ORAL
Refills: 0 | Status: DISCONTINUED | OUTPATIENT
Start: 2025-04-01 | End: 2025-04-06

## 2025-04-01 RX ORDER — AZITHROMYCIN 250 MG
500 CAPSULE ORAL ONCE
Refills: 0 | Status: COMPLETED | OUTPATIENT
Start: 2025-04-01 | End: 2025-04-01

## 2025-04-01 RX ORDER — AZITHROMYCIN 250 MG
500 CAPSULE ORAL EVERY 24 HOURS
Refills: 0 | Status: DISCONTINUED | OUTPATIENT
Start: 2025-04-02 | End: 2025-04-06

## 2025-04-01 RX ORDER — DEXMEDETOMIDINE HYDROCHLORIDE IN SODIUM CHLORIDE 4 UG/ML
0.5 INJECTION INTRAVENOUS
Qty: 400 | Refills: 0 | Status: DISCONTINUED | OUTPATIENT
Start: 2025-04-01 | End: 2025-04-01

## 2025-04-01 RX ADMIN — Medication 15 UNIT(S): at 12:16

## 2025-04-01 RX ADMIN — HEPARIN SODIUM 5000 UNIT(S): 1000 INJECTION INTRAVENOUS; SUBCUTANEOUS at 22:54

## 2025-04-01 RX ADMIN — IPRATROPIUM BROMIDE AND ALBUTEROL SULFATE 3 MILLILITER(S): .5; 2.5 SOLUTION RESPIRATORY (INHALATION) at 08:50

## 2025-04-01 RX ADMIN — HALOPERIDOL 2 MILLIGRAM(S): 10 TABLET ORAL at 11:56

## 2025-04-01 RX ADMIN — CEFTRIAXONE 100 MILLIGRAM(S): 500 INJECTION, POWDER, FOR SOLUTION INTRAMUSCULAR; INTRAVENOUS at 08:45

## 2025-04-01 RX ADMIN — Medication 1 APPLICATION(S): at 05:03

## 2025-04-01 RX ADMIN — Medication 1 DROP(S): at 17:55

## 2025-04-01 RX ADMIN — Medication 15 MILLILITER(S): at 17:55

## 2025-04-01 RX ADMIN — INSULIN LISPRO 10: 100 INJECTION, SOLUTION INTRAVENOUS; SUBCUTANEOUS at 16:04

## 2025-04-01 RX ADMIN — Medication 40 MILLIGRAM(S): at 11:39

## 2025-04-01 RX ADMIN — IPRATROPIUM BROMIDE AND ALBUTEROL SULFATE 3 MILLILITER(S): .5; 2.5 SOLUTION RESPIRATORY (INHALATION) at 14:59

## 2025-04-01 RX ADMIN — PROPOFOL 11.6 MICROGRAM(S)/KG/MIN: 10 INJECTION, EMULSION INTRAVENOUS at 02:14

## 2025-04-01 RX ADMIN — INSULIN GLARGINE-YFGN 45 UNIT(S): 100 INJECTION, SOLUTION SUBCUTANEOUS at 22:53

## 2025-04-01 RX ADMIN — Medication 1000 MILLIGRAM(S): at 23:00

## 2025-04-01 RX ADMIN — PROPOFOL 11.6 MICROGRAM(S)/KG/MIN: 10 INJECTION, EMULSION INTRAVENOUS at 22:49

## 2025-04-01 RX ADMIN — METHYLPREDNISOLONE ACETATE 40 MILLIGRAM(S): 80 INJECTION, SUSPENSION INTRA-ARTICULAR; INTRALESIONAL; INTRAMUSCULAR; SOFT TISSUE at 05:03

## 2025-04-01 RX ADMIN — Medication 400 MILLIGRAM(S): at 22:57

## 2025-04-01 RX ADMIN — LATANOPROST PF 1 DROP(S): 0.05 SOLUTION/ DROPS OPHTHALMIC at 22:54

## 2025-04-01 RX ADMIN — HEPARIN SODIUM 5000 UNIT(S): 1000 INJECTION INTRAVENOUS; SUBCUTANEOUS at 13:16

## 2025-04-01 RX ADMIN — Medication 250 MILLIGRAM(S): at 11:39

## 2025-04-01 RX ADMIN — PROPOFOL 11.6 MICROGRAM(S)/KG/MIN: 10 INJECTION, EMULSION INTRAVENOUS at 06:07

## 2025-04-01 RX ADMIN — IPRATROPIUM BROMIDE AND ALBUTEROL SULFATE 3 MILLILITER(S): .5; 2.5 SOLUTION RESPIRATORY (INHALATION) at 02:37

## 2025-04-01 RX ADMIN — HEPARIN SODIUM 5000 UNIT(S): 1000 INJECTION INTRAVENOUS; SUBCUTANEOUS at 05:03

## 2025-04-01 RX ADMIN — INSULIN LISPRO 10: 100 INJECTION, SOLUTION INTRAVENOUS; SUBCUTANEOUS at 11:40

## 2025-04-01 RX ADMIN — Medication 2 MILLIGRAM(S): at 17:28

## 2025-04-01 RX ADMIN — PROPOFOL 11.6 MICROGRAM(S)/KG/MIN: 10 INJECTION, EMULSION INTRAVENOUS at 11:39

## 2025-04-01 RX ADMIN — INSULIN LISPRO 8: 100 INJECTION, SOLUTION INTRAVENOUS; SUBCUTANEOUS at 06:07

## 2025-04-01 RX ADMIN — IPRATROPIUM BROMIDE AND ALBUTEROL SULFATE 3 MILLILITER(S): .5; 2.5 SOLUTION RESPIRATORY (INHALATION) at 20:04

## 2025-04-01 RX ADMIN — Medication 15 MILLILITER(S): at 05:03

## 2025-04-01 RX ADMIN — Medication 1 DROP(S): at 05:03

## 2025-04-01 RX ADMIN — Medication 14.4 MICROGRAM(S)/KG/HR: at 02:23

## 2025-04-01 NOTE — PROGRESS NOTE ADULT - SUBJECTIVE AND OBJECTIVE BOX
INTERVAL HPI/OVERNIGHT EVENTS: Alert, awake, copious secretions suctioned.     PRESSORS: [ ] YES [X] NO  WHICH:    Antimicrobial:  azithromycin  IVPB      cefTRIAXone   IVPB 2000 milliGRAM(s) IV Intermittent every 24 hours    Cardiovascular:  norepinephrine Infusion 0.05 MICROgram(s)/kG/Min IV Continuous <Continuous>    Pulmonary:  albuterol/ipratropium for Nebulization 3 milliLiter(s) Nebulizer every 6 hours    Hematalogic:  heparin   Injectable 5000 Unit(s) SubCutaneous every 8 hours    Other:  artificial  tears Solution 1 Drop(s) Both EYES two times a day  chlorhexidine 0.12% Liquid 15 milliLiter(s) Oral Mucosa every 12 hours  chlorhexidine 2% Cloths 1 Application(s) Topical <User Schedule>  dexMEDEtomidine Infusion 0.5 MICROgram(s)/kG/Hr IV Continuous <Continuous>  dextrose 5%. 1000 milliLiter(s) IV Continuous <Continuous>  dextrose 5%. 1000 milliLiter(s) IV Continuous <Continuous>  dextrose 50% Injectable 25 Gram(s) IV Push once  dextrose 50% Injectable 12.5 Gram(s) IV Push once  dextrose 50% Injectable 25 Gram(s) IV Push once  dextrose Oral Gel 15 Gram(s) Oral once PRN  fentaNYL   Infusion 1.5 MICROgram(s)/kG/Hr IV Continuous <Continuous>  glucagon  Injectable 1 milliGRAM(s) IntraMuscular once  insulin glargine Injectable (LANTUS) 45 Unit(s) SubCutaneous at bedtime  insulin lispro (ADMELOG) corrective regimen sliding scale   SubCutaneous three times a day before meals  latanoprost 0.005% Ophthalmic Solution 1 Drop(s) Both EYES at bedtime  pantoprazole  Injectable 40 milliGRAM(s) IV Push daily  propofol Infusion 20 MICROgram(s)/kG/Min IV Continuous <Continuous>    albuterol/ipratropium for Nebulization 3 milliLiter(s) Nebulizer every 6 hours  artificial  tears Solution 1 Drop(s) Both EYES two times a day  azithromycin  IVPB      cefTRIAXone   IVPB 2000 milliGRAM(s) IV Intermittent every 24 hours  chlorhexidine 0.12% Liquid 15 milliLiter(s) Oral Mucosa every 12 hours  chlorhexidine 2% Cloths 1 Application(s) Topical <User Schedule>  dexMEDEtomidine Infusion 0.5 MICROgram(s)/kG/Hr IV Continuous <Continuous>  dextrose 5%. 1000 milliLiter(s) IV Continuous <Continuous>  dextrose 5%. 1000 milliLiter(s) IV Continuous <Continuous>  dextrose 50% Injectable 25 Gram(s) IV Push once  dextrose 50% Injectable 12.5 Gram(s) IV Push once  dextrose 50% Injectable 25 Gram(s) IV Push once  dextrose Oral Gel 15 Gram(s) Oral once PRN  fentaNYL   Infusion 1.5 MICROgram(s)/kG/Hr IV Continuous <Continuous>  glucagon  Injectable 1 milliGRAM(s) IntraMuscular once  heparin   Injectable 5000 Unit(s) SubCutaneous every 8 hours  insulin glargine Injectable (LANTUS) 45 Unit(s) SubCutaneous at bedtime  insulin lispro (ADMELOG) corrective regimen sliding scale   SubCutaneous three times a day before meals  latanoprost 0.005% Ophthalmic Solution 1 Drop(s) Both EYES at bedtime  norepinephrine Infusion 0.05 MICROgram(s)/kG/Min IV Continuous <Continuous>  pantoprazole  Injectable 40 milliGRAM(s) IV Push daily  propofol Infusion 20 MICROgram(s)/kG/Min IV Continuous <Continuous>    Drug Dosing Weight  Height (cm): 172.7 (29 Mar 2025 16:46)  Weight (kg): 96.3 (29 Mar 2025 16:46)  BMI (kg/m2): 32.3 (29 Mar 2025 16:46)  BSA (m2): 2.1 (29 Mar 2025 16:46)    CENTRAL LINE: [ ] YES [X] NO  LOCATION:   DATE INSERTED:  REMOVE: [ ] YES [ ] NO  EXPLAIN:    AMOR: [X] YES [ ] NO    DATE INSERTED:  REMOVE:  [ ] YES [ ] NO  EXPLAIN:    A-LINE:  [ ] YES [X] NO  LOCATION:   DATE INSERTED:  REMOVE:  [ ] YES [ ] NO  EXPLAIN:    [facility] -reviewed admission note, no change since admission  PAST MEDICAL & SURGICAL HISTORY:  COPD (chronic obstructive pulmonary disease)  Oxygen 2-3L at home      Stented coronary artery  2017      HLD (hyperlipidemia)      Pulmonary HTN      Type 2 diabetes mellitus without complication, with long-term current use of insulin      DM (diabetes mellitus)      CAD (coronary artery disease)      GERD (gastroesophageal reflux disease)      Insomnia      CHF (congestive heart failure)      HTN (hypertension)      Mood disorder      COPD (chronic obstructive pulmonary disease)      HTN (hypertension)      DM (diabetes mellitus)      CAD (coronary artery disease)      Bipolar disorder      CYNTHIA treated with BiPAP      No significant past surgical history          ICU Vital Signs Last 24 Hrs  T(C): 37.2 (01 Apr 2025 15:00), Max: 38.5 (31 Mar 2025 22:00)  T(F): 99 (01 Apr 2025 15:00), Max: 101.3 (31 Mar 2025 22:00)  HR: 73 (01 Apr 2025 15:00) (69 - 114)  BP: 94/60 (01 Apr 2025 15:00) (81/68 - 141/68)  BP(mean): 71 (01 Apr 2025 15:00) (63 - 99)  ABP: --  ABP(mean): --  RR: 18 (01 Apr 2025 15:00) (16 - 27)  SpO2: 96% (01 Apr 2025 15:00) (88% - 100%)        ABG - ( 31 Mar 2025 11:47 )  pH, Arterial: 7.36  pH, Blood: x     /  pCO2: 65    /  pO2: 72    / HCO3: 37    / Base Excess: 8.8   /  SaO2: 95                    03-31 @ 07:01  -  04-01 @ 07:00  --------------------------------------------------------  IN: 1216 mL / OUT: 1635 mL / NET: -419 mL        Mode: AC/ CMV (Assist Control/ Continuous Mandatory Ventilation)  RR (machine): 18  TV (machine): 420  FiO2: 50  PEEP: 6  ITime: 1  MAP: 10  PIP: 19      PHYSICAL EXAM:    GENERAL: Awake, intubated  HEAD:  Atraumatic, Normocephalic  EYES: Pinpoint pupils  ENMT: No tonsillar erythema, exudates, or enlargement; Decreased secretions compared to prior  NECK: Supple, No JVD, Normal thyroid  HEART: Regular rate and rhythm; No murmurs, rubs, or gallops  RESPIRATORY: Lung sounds difficult to appreciate due to body habitus, decreased breath sounds b/l, some crackles  ABDOMEN: Soft, Nontender, Nondistended; Bowel sounds present  NEUROLOGY: Intubated  EXTREMITIES:  2+ Peripheral Pulses, No clubbing, cyanosis, or edema  SKIN: warm, dry, normal color, no rash or abnormal lesions      LABS:  CBC Full  -  ( 01 Apr 2025 03:25 )  WBC Count : 16.78 K/uL  RBC Count : 4.49 M/uL  Hemoglobin : 12.5 g/dL  Hematocrit : 40.3 %  Platelet Count - Automated : 253 K/uL  Mean Cell Volume : 89.8 fl  Mean Cell Hemoglobin : 27.8 pg  Mean Cell Hemoglobin Concentration : 31.0 g/dL  Auto Neutrophil # : x  Auto Lymphocyte # : x  Auto Monocyte # : x  Auto Eosinophil # : x  Auto Basophil # : x  Auto Neutrophil % : x  Auto Lymphocyte % : x  Auto Monocyte % : x  Auto Eosinophil % : x  Auto Basophil % : x    04-01    139  |  100  |  43[H]  ----------------------------<  344[H]  4.5   |  36[H]  |  1.12    Ca    8.3[L]      01 Apr 2025 03:25  Phos  2.9     04-01  Mg     2.9     04-01    TPro  7.1  /  Alb  2.7[L]  /  TBili  0.3  /  DBili  x   /  AST  27  /  ALT  22  /  AlkPhos  75  04-01      Urinalysis Basic - ( 01 Apr 2025 03:25 )    Color: x / Appearance: x / SG: x / pH: x  Gluc: 344 mg/dL / Ketone: x  / Bili: x / Urobili: x   Blood: x / Protein: x / Nitrite: x   Leuk Esterase: x / RBC: x / WBC x   Sq Epi: x / Non Sq Epi: x / Bacteria: x      Culture Results:   Commensal baldomero consistent with body site (03-30 @ 19:57)      RADIOLOGY & ADDITIONAL STUDIES REVIEWED:  ***    [ ]GOALS OF CARE DISCUSSION WITH PATIENT/FAMILY/PROXY:    CRITICAL CARE TIME SPENT: 35 minutes

## 2025-04-01 NOTE — PROGRESS NOTE ADULT - ASSESSMENT
Pt is a 64M from [facility], w/ PMHx of CAD, CHF, DM, CYNTHIA on nocturnal bipap, COPD (multiple intubations in past on 3L of home O2), peripheral neuropathy, GERD, HTN, HLD, obesity, polyarthritis, Pulmonary HTN, TIA, BPH, depression/anxiety p/w respiratory distress from NH. Placed on BiPAP, s/p lasix 80, solumedrol 125 . Admitted for AHRF 2/2 COPD vs. CHF exacerbation.     #AHRF 2/2 COPD exacerbation vs. CHF exacerbation  #COPD  #HFpEF   #CAD  #HTN  #HLD  #CYNTHIA on nocturnal bipap  #Pulm HTN  #BPH  #Depression/Anxiety   #GERD    _________CNS___________  #intubated, sedated  - copious secretions  - sputum cx collected and sent  - fentanyl,  propofol  - start precedex due to limited sedation with propofol, urine green    #anxiety/depression  - resume Trazadone, risperidone, alprazolam once off NPO      _________CVS___________  #HFpEF   - not clinically overloaded in the ED, POCUS a-line predominant, grossly preserved systolic fxn   - TTE from 10/2023- EF 50-55%, G1DD    - proBNP 11K  - Trop 145 > 101  - EKG: sinus tachycardia, RBBB (compared to prior)  - CXR shows no consolidation, congestion, effusions pending official read   - s/p lasix 80 x1  - TTE: technically difficult read  - monitor I&O, for now will hold lasix fow now, if additional diuresis needed given that pt does not appear clinically overloaded     #CAD  - c/w asa, statin once off NPO     #HTN  - not on anti-hypertensives  - continue to monitor    #HLD   - c/w statin once off NPO     _________ RESP__________  #AHRF 2/2 COPD exacerbation vs. CHF exacerbation  #COPD  #CYNTHIA  - s/p lasix 80 and solumedrol 125   - c/w solumedrol 40q12  - duoneb x3 doses stat, then q6 RTC  - start advair   - AB.39|65|90|39    #CAP Coverage  - Episodic fevers  - Increased secretions yesterday prior to intubation, possible aspiration event  - Increase ceftriaxone 2 g qd  - Start Azithromycin 500 qd 3 dayas    __________GI____________  #Nutrition  - Intubated, NG tube placed  - Initiate trickle tube feeds    #GERD  - PPI     ________ RENAL__________  #RADHIKA  - baseline .66 in , now 1.4  - likely pre-renal in nature vs. post-renal obstruction/retention related   - mark placed in ED 3/29  - montior Cr post diuresis, strict I&O    #BPH  - mark in place  - resume Flomax and finasteride once off NPO     #Respiratory Acidosis   - ABG 7.21/89/53/36 > 7.23/83/66/35 > >> 7.39|65|90|39  - in the setting of AHRF,COPD exacerbation  - Consider acetazolamide if bicarb inc    _________MSK___________  no active issues     __________ID____________  #PNA  - pt met SIRS criteria with tachycardia and WC 15 in ED  - episodic fevers, TMax 101, increased WBC 28  - lactate 1   - UA neg    - c/w ctx 2 g qd  - ET Tube culture - normal baldomero  - BCx: NGTD    _________ENDO__________  #T2IDDM  - on novlog mix 70/30 bid at home, 58u in the AM, 46u in the evening + novolin R sliding scale   - Lantus 45 u at bedtime (originally decreased dose to 30 given NPO status, mod ISSq6 while NPO   - s/p NPH   - last a1c 9.4, 2024, most recent A1c 11.3     _______HEME/ONC_______  no active issues     _________SKIN____________  no active issues   Peripheral IVs     ________Prophylaxis_______  #DVT- heparin subq  #GI- ppi      __________GOC/ DISPO___________  FULL CODE, ICU

## 2025-04-02 LAB
ALBUMIN SERPL ELPH-MCNC: 2.6 G/DL — LOW (ref 3.5–5)
ALP SERPL-CCNC: 71 U/L — SIGNIFICANT CHANGE UP (ref 40–120)
ALT FLD-CCNC: 24 U/L DA — SIGNIFICANT CHANGE UP (ref 10–60)
ANION GAP SERPL CALC-SCNC: 3 MMOL/L — LOW (ref 5–17)
APPEARANCE UR: CLEAR — SIGNIFICANT CHANGE UP
AST SERPL-CCNC: 21 U/L — SIGNIFICANT CHANGE UP (ref 10–40)
BACTERIA # UR AUTO: ABNORMAL /HPF
BASE EXCESS BLDA CALC-SCNC: 16.6 MMOL/L — HIGH (ref -2–3)
BASOPHILS # BLD AUTO: 0.04 K/UL — SIGNIFICANT CHANGE UP (ref 0–0.2)
BASOPHILS NFR BLD AUTO: 0.3 % — SIGNIFICANT CHANGE UP (ref 0–2)
BILIRUB SERPL-MCNC: 0.3 MG/DL — SIGNIFICANT CHANGE UP (ref 0.2–1.2)
BILIRUB UR-MCNC: NEGATIVE — SIGNIFICANT CHANGE UP
BLOOD GAS COMMENTS ARTERIAL: SIGNIFICANT CHANGE UP
BUN SERPL-MCNC: 38 MG/DL — HIGH (ref 7–18)
CALCIUM SERPL-MCNC: 7.8 MG/DL — LOW (ref 8.4–10.5)
CHLORIDE SERPL-SCNC: 104 MMOL/L — SIGNIFICANT CHANGE UP (ref 96–108)
CHLORIDE UR-SCNC: 24 MMOL/L — SIGNIFICANT CHANGE UP
CO2 SERPL-SCNC: 31 MMOL/L — SIGNIFICANT CHANGE UP (ref 22–31)
COLOR SPEC: YELLOW — SIGNIFICANT CHANGE UP
COMMENT - URINE: SIGNIFICANT CHANGE UP
CREAT ?TM UR-MCNC: 119 MG/DL — SIGNIFICANT CHANGE UP
CREAT SERPL-MCNC: 0.97 MG/DL — SIGNIFICANT CHANGE UP (ref 0.5–1.3)
DIFF PNL FLD: NEGATIVE — SIGNIFICANT CHANGE UP
EGFR: 88 ML/MIN/1.73M2 — SIGNIFICANT CHANGE UP
EGFR: 88 ML/MIN/1.73M2 — SIGNIFICANT CHANGE UP
EOSINOPHIL # BLD AUTO: 0.03 K/UL — SIGNIFICANT CHANGE UP (ref 0–0.5)
EOSINOPHIL NFR BLD AUTO: 0.2 % — SIGNIFICANT CHANGE UP (ref 0–6)
EPI CELLS # UR: PRESENT
GLUCOSE BLDC GLUCOMTR-MCNC: 262 MG/DL — HIGH (ref 70–99)
GLUCOSE BLDC GLUCOMTR-MCNC: 286 MG/DL — HIGH (ref 70–99)
GLUCOSE BLDC GLUCOMTR-MCNC: 350 MG/DL — HIGH (ref 70–99)
GLUCOSE BLDC GLUCOMTR-MCNC: 387 MG/DL — HIGH (ref 70–99)
GLUCOSE SERPL-MCNC: 299 MG/DL — HIGH (ref 70–99)
GLUCOSE UR QL: 100 MG/DL
HCO3 BLDA-SCNC: 45 MMOL/L — CRITICAL HIGH (ref 21–28)
HCT VFR BLD CALC: 44.3 % — SIGNIFICANT CHANGE UP (ref 39–50)
HGB BLD-MCNC: 13.5 G/DL — SIGNIFICANT CHANGE UP (ref 13–17)
HOROWITZ INDEX BLDA+IHG-RTO: 50 — SIGNIFICANT CHANGE UP
HYALINE CASTS # UR AUTO: PRESENT
IMM GRANULOCYTES NFR BLD AUTO: 1.5 % — HIGH (ref 0–0.9)
KETONES UR-MCNC: ABNORMAL MG/DL
LEUKOCYTE ESTERASE UR-ACNC: ABNORMAL
LYMPHOCYTES # BLD AUTO: 2.84 K/UL — SIGNIFICANT CHANGE UP (ref 1–3.3)
LYMPHOCYTES # BLD AUTO: 22.7 % — SIGNIFICANT CHANGE UP (ref 13–44)
MAGNESIUM SERPL-MCNC: 2.8 MG/DL — HIGH (ref 1.6–2.6)
MCHC RBC-ENTMCNC: 27.7 PG — SIGNIFICANT CHANGE UP (ref 27–34)
MCHC RBC-ENTMCNC: 30.5 G/DL — LOW (ref 32–36)
MCV RBC AUTO: 91 FL — SIGNIFICANT CHANGE UP (ref 80–100)
MONOCYTES # BLD AUTO: 1.3 K/UL — HIGH (ref 0–0.9)
MONOCYTES NFR BLD AUTO: 10.4 % — SIGNIFICANT CHANGE UP (ref 2–14)
NEUTROPHILS # BLD AUTO: 8.11 K/UL — HIGH (ref 1.8–7.4)
NEUTROPHILS NFR BLD AUTO: 64.9 % — SIGNIFICANT CHANGE UP (ref 43–77)
NITRITE UR-MCNC: NEGATIVE — SIGNIFICANT CHANGE UP
NRBC BLD AUTO-RTO: 0 /100 WBCS — SIGNIFICANT CHANGE UP (ref 0–0)
OSMOLALITY UR: 893 MOS/KG — SIGNIFICANT CHANGE UP (ref 50–1200)
PCO2 BLDA: 69 MMHG — HIGH (ref 35–48)
PH BLDA: 7.42 — SIGNIFICANT CHANGE UP (ref 7.35–7.45)
PH UR: 5.5 — SIGNIFICANT CHANGE UP (ref 5–8)
PHOSPHATE SERPL-MCNC: 2.6 MG/DL — SIGNIFICANT CHANGE UP (ref 2.5–4.5)
PLATELET # BLD AUTO: 193 K/UL — SIGNIFICANT CHANGE UP (ref 150–400)
PO2 BLDA: 129 MMHG — HIGH (ref 83–108)
POTASSIUM SERPL-MCNC: 4.4 MMOL/L — SIGNIFICANT CHANGE UP (ref 3.5–5.3)
POTASSIUM SERPL-SCNC: 4.4 MMOL/L — SIGNIFICANT CHANGE UP (ref 3.5–5.3)
POTASSIUM UR-SCNC: 28 MMOL/L — SIGNIFICANT CHANGE UP
PROT ?TM UR-MCNC: 98 MG/DL — HIGH (ref 0–12)
PROT SERPL-MCNC: 6.7 G/DL — SIGNIFICANT CHANGE UP (ref 6–8.3)
PROT UR-MCNC: 100 MG/DL
PROT/CREAT UR-RTO: 0.8 RATIO — HIGH (ref 0–0.2)
RBC # BLD: 4.87 M/UL — SIGNIFICANT CHANGE UP (ref 4.2–5.8)
RBC # FLD: 14.2 % — SIGNIFICANT CHANGE UP (ref 10.3–14.5)
RBC CASTS # UR COMP ASSIST: 5 /HPF — HIGH (ref 0–4)
SAO2 % BLDA: 100 % — SIGNIFICANT CHANGE UP
SODIUM SERPL-SCNC: 138 MMOL/L — SIGNIFICANT CHANGE UP (ref 135–145)
SODIUM UR-SCNC: 27 MMOL/L — SIGNIFICANT CHANGE UP
SP GR SPEC: 1.03 — HIGH (ref 1–1.03)
TRIGL SERPL-MCNC: 989 MG/DL — HIGH
UROBILINOGEN FLD QL: 1 MG/DL — SIGNIFICANT CHANGE UP (ref 0.2–1)
WBC # BLD: 12.51 K/UL — HIGH (ref 3.8–10.5)
WBC # FLD AUTO: 12.51 K/UL — HIGH (ref 3.8–10.5)
WBC UR QL: ABNORMAL /HPF (ref 0–5)

## 2025-04-02 PROCEDURE — 71045 X-RAY EXAM CHEST 1 VIEW: CPT | Mod: 26

## 2025-04-02 RX ORDER — RISPERIDONE 4 MG
0.25 TABLET ORAL AT BEDTIME
Refills: 0 | Status: DISCONTINUED | OUTPATIENT
Start: 2025-04-02 | End: 2025-04-09

## 2025-04-02 RX ORDER — INSULIN LISPRO 100 U/ML
3 INJECTION, SOLUTION INTRAVENOUS; SUBCUTANEOUS
Refills: 0 | Status: DISCONTINUED | OUTPATIENT
Start: 2025-04-02 | End: 2025-04-03

## 2025-04-02 RX ORDER — INSULIN LISPRO 100 U/ML
INJECTION, SOLUTION INTRAVENOUS; SUBCUTANEOUS AT BEDTIME
Refills: 0 | Status: DISCONTINUED | OUTPATIENT
Start: 2025-04-02 | End: 2025-04-09

## 2025-04-02 RX ORDER — ACETAMINOPHEN 500 MG/5ML
1000 LIQUID (ML) ORAL ONCE
Refills: 0 | Status: COMPLETED | OUTPATIENT
Start: 2025-04-02 | End: 2025-04-02

## 2025-04-02 RX ORDER — ASPIRIN 325 MG
81 TABLET ORAL DAILY
Refills: 0 | Status: DISCONTINUED | OUTPATIENT
Start: 2025-04-02 | End: 2025-04-09

## 2025-04-02 RX ORDER — DEXMEDETOMIDINE HYDROCHLORIDE IN SODIUM CHLORIDE 4 UG/ML
0.2 INJECTION INTRAVENOUS
Qty: 400 | Refills: 0 | Status: DISCONTINUED | OUTPATIENT
Start: 2025-04-02 | End: 2025-04-02

## 2025-04-02 RX ORDER — INSULIN LISPRO 100 U/ML
INJECTION, SOLUTION INTRAVENOUS; SUBCUTANEOUS
Refills: 0 | Status: DISCONTINUED | OUTPATIENT
Start: 2025-04-02 | End: 2025-04-09

## 2025-04-02 RX ORDER — FINASTERIDE 1 MG/1
5 TABLET, FILM COATED ORAL DAILY
Refills: 0 | Status: DISCONTINUED | OUTPATIENT
Start: 2025-04-02 | End: 2025-04-09

## 2025-04-02 RX ORDER — NICOTINE POLACRILEX 4 MG/1
1 GUM, CHEWING ORAL DAILY
Refills: 0 | Status: DISCONTINUED | OUTPATIENT
Start: 2025-04-02 | End: 2025-04-09

## 2025-04-02 RX ORDER — TRAZODONE HCL 100 MG
150 TABLET ORAL DAILY
Refills: 0 | Status: DISCONTINUED | OUTPATIENT
Start: 2025-04-02 | End: 2025-04-09

## 2025-04-02 RX ORDER — ATORVASTATIN CALCIUM 80 MG/1
20 TABLET, FILM COATED ORAL AT BEDTIME
Refills: 0 | Status: DISCONTINUED | OUTPATIENT
Start: 2025-04-02 | End: 2025-04-09

## 2025-04-02 RX ORDER — ALPRAZOLAM 0.5 MG
0.5 TABLET, EXTENDED RELEASE 24 HR ORAL THREE TIMES A DAY
Refills: 0 | Status: DISCONTINUED | OUTPATIENT
Start: 2025-04-02 | End: 2025-04-09

## 2025-04-02 RX ORDER — FUROSEMIDE 10 MG/ML
20 INJECTION INTRAMUSCULAR; INTRAVENOUS ONCE
Refills: 0 | Status: COMPLETED | OUTPATIENT
Start: 2025-04-02 | End: 2025-04-02

## 2025-04-02 RX ORDER — TAMSULOSIN HYDROCHLORIDE 0.4 MG/1
0.4 CAPSULE ORAL AT BEDTIME
Refills: 0 | Status: DISCONTINUED | OUTPATIENT
Start: 2025-04-02 | End: 2025-04-09

## 2025-04-02 RX ORDER — ACETAZOLAMIDE 250 MG/1
500 TABLET ORAL ONCE
Refills: 0 | Status: DISCONTINUED | OUTPATIENT
Start: 2025-04-02 | End: 2025-04-02

## 2025-04-02 RX ORDER — ACETAZOLAMIDE 250 MG/1
500 TABLET ORAL ONCE
Refills: 0 | Status: COMPLETED | OUTPATIENT
Start: 2025-04-02 | End: 2025-04-02

## 2025-04-02 RX ADMIN — IPRATROPIUM BROMIDE AND ALBUTEROL SULFATE 3 MILLILITER(S): .5; 2.5 SOLUTION RESPIRATORY (INHALATION) at 02:38

## 2025-04-02 RX ADMIN — HEPARIN SODIUM 5000 UNIT(S): 1000 INJECTION INTRAVENOUS; SUBCUTANEOUS at 13:33

## 2025-04-02 RX ADMIN — CEFTRIAXONE 100 MILLIGRAM(S): 500 INJECTION, POWDER, FOR SOLUTION INTRAMUSCULAR; INTRAVENOUS at 09:35

## 2025-04-02 RX ADMIN — TAMSULOSIN HYDROCHLORIDE 0.4 MILLIGRAM(S): 0.4 CAPSULE ORAL at 22:06

## 2025-04-02 RX ADMIN — DEXMEDETOMIDINE HYDROCHLORIDE IN SODIUM CHLORIDE 4.82 MICROGRAM(S)/KG/HR: 4 INJECTION INTRAVENOUS at 09:34

## 2025-04-02 RX ADMIN — Medication 250 MILLIGRAM(S): at 10:07

## 2025-04-02 RX ADMIN — PROPOFOL 11.6 MICROGRAM(S)/KG/MIN: 10 INJECTION, EMULSION INTRAVENOUS at 01:56

## 2025-04-02 RX ADMIN — Medication 1 APPLICATION(S): at 05:16

## 2025-04-02 RX ADMIN — Medication 1 DROP(S): at 06:04

## 2025-04-02 RX ADMIN — Medication 40 MILLIGRAM(S): at 11:38

## 2025-04-02 RX ADMIN — Medication 35 UNIT(S): at 22:42

## 2025-04-02 RX ADMIN — INSULIN LISPRO 10: 100 INJECTION, SOLUTION INTRAVENOUS; SUBCUTANEOUS at 17:30

## 2025-04-02 RX ADMIN — Medication 0.25 MILLIGRAM(S): at 22:04

## 2025-04-02 RX ADMIN — LATANOPROST PF 1 DROP(S): 0.05 SOLUTION/ DROPS OPHTHALMIC at 22:07

## 2025-04-02 RX ADMIN — HEPARIN SODIUM 5000 UNIT(S): 1000 INJECTION INTRAVENOUS; SUBCUTANEOUS at 22:04

## 2025-04-02 RX ADMIN — FINASTERIDE 5 MILLIGRAM(S): 1 TABLET, FILM COATED ORAL at 22:05

## 2025-04-02 RX ADMIN — Medication 400 MILLIGRAM(S): at 14:31

## 2025-04-02 RX ADMIN — HEPARIN SODIUM 5000 UNIT(S): 1000 INJECTION INTRAVENOUS; SUBCUTANEOUS at 06:04

## 2025-04-02 RX ADMIN — INSULIN LISPRO 6: 100 INJECTION, SOLUTION INTRAVENOUS; SUBCUTANEOUS at 06:51

## 2025-04-02 RX ADMIN — INSULIN LISPRO 1: 100 INJECTION, SOLUTION INTRAVENOUS; SUBCUTANEOUS at 22:41

## 2025-04-02 RX ADMIN — IPRATROPIUM BROMIDE AND ALBUTEROL SULFATE 3 MILLILITER(S): .5; 2.5 SOLUTION RESPIRATORY (INHALATION) at 15:53

## 2025-04-02 RX ADMIN — INSULIN LISPRO 8: 100 INJECTION, SOLUTION INTRAVENOUS; SUBCUTANEOUS at 11:39

## 2025-04-02 RX ADMIN — IPRATROPIUM BROMIDE AND ALBUTEROL SULFATE 3 MILLILITER(S): .5; 2.5 SOLUTION RESPIRATORY (INHALATION) at 20:13

## 2025-04-02 RX ADMIN — METHYLPREDNISOLONE ACETATE 40 MILLIGRAM(S): 80 INJECTION, SUSPENSION INTRA-ARTICULAR; INTRALESIONAL; INTRAMUSCULAR; SOFT TISSUE at 06:04

## 2025-04-02 RX ADMIN — NICOTINE POLACRILEX 1 PATCH: 4 GUM, CHEWING ORAL at 11:38

## 2025-04-02 RX ADMIN — Medication 1 DROP(S): at 17:30

## 2025-04-02 RX ADMIN — IPRATROPIUM BROMIDE AND ALBUTEROL SULFATE 3 MILLILITER(S): .5; 2.5 SOLUTION RESPIRATORY (INHALATION) at 08:22

## 2025-04-02 RX ADMIN — Medication 81 MILLIGRAM(S): at 22:04

## 2025-04-02 RX ADMIN — ATORVASTATIN CALCIUM 20 MILLIGRAM(S): 80 TABLET, FILM COATED ORAL at 22:04

## 2025-04-02 RX ADMIN — FUROSEMIDE 20 MILLIGRAM(S): 10 INJECTION INTRAMUSCULAR; INTRAVENOUS at 09:46

## 2025-04-02 RX ADMIN — NICOTINE POLACRILEX 1 PATCH: 4 GUM, CHEWING ORAL at 20:12

## 2025-04-02 RX ADMIN — Medication 1000 MILLIGRAM(S): at 15:30

## 2025-04-02 RX ADMIN — ACETAZOLAMIDE 500 MILLIGRAM(S): 250 TABLET ORAL at 11:37

## 2025-04-02 RX ADMIN — Medication 15 MILLILITER(S): at 05:16

## 2025-04-02 RX ADMIN — Medication 150 MILLIGRAM(S): at 22:06

## 2025-04-02 NOTE — PHARMACOTHERAPY INTERVENTION NOTE - COMMENTS
Parenteral acetazolamide is usually given by IV push. 
Duplicate orders of Duoneb. Reminded the provider to discontinue one of them.

## 2025-04-02 NOTE — PROGRESS NOTE ADULT - ASSESSMENT
Pt is a 64M from [facility], w/ PMHx of CAD, CHF, DM, CYNTHIA on nocturnal bipap, COPD (multiple intubations in past on 3L of home O2), peripheral neuropathy, GERD, HTN, HLD, obesity, polyarthritis, Pulmonary HTN, TIA, BPH, depression/anxiety p/w respiratory distress from NH. Placed on BiPAP, s/p lasix 80, solumedrol 125 . Admitted for AHRF 2/2 COPD vs. CHF exacerbation.     #AHRF 2/2 COPD exacerbation vs. CHF exacerbation  #COPD  #HFpEF   #CAD  #HTN  #HLD  #CYNTHIA on nocturnal bipap  #Pulm HTN  #BPH  #Depression/Anxiety   #GERD    _________CNS___________  #Mentation  - s/p extubation   - Baseline mental status    #anxiety/depression  - Resumed Trazadone, risperidone, alprazolam, off NPO     _________CVS___________  #HFpEF   - not clinically overloaded in the ED, POCUS a-line predominant, grossly preserved systolic fxn   - repeat POCUS - some b-lines noted  - TTE from 10/2023- EF 50-55%, G1DD    - proBNP 11K  - Trop 145 > 101  - EKG: sinus tachycardia, RBBB (compared to prior)  - CXR shows no consolidation, congestion, effusions pending official read   - s/p lasix 80 x1  - repeat TTE: technically difficult read  - monitor I&O  -  acetazolamide 500 x 1, 20 lasix    #CAD  - c/w asa, statin    #HTN  - not on anti-hypertensives  - continue to monitor    #HLD   - c/w statin     _________ RESP__________  #AHRF 2/2 COPD exacerbation vs. CHF exacerbation  #COPD  #CYNTHIA  - s/p lasix 80 and solumedrol 125   - c/w solumedrol 40q12  - duoneb x3 doses stat, then q6 RTC    #CAP Coverage, Aspiration PNA  - Episodic fevers  - Increased secretions yesterday prior to intubation, possible aspiration event  - Increase ceftriaxone 2 g qd  - Start Azithromycin 500 qd 3 dayas    __________GI____________  #Nutrition  - DASH, Diabetic Diet    #GERD  - PPI     ________ RENAL__________  #RADHIKA  - baseline .66 in , now 1.4  - likely pre-renal in nature vs. post-renal obstruction/retention related   - mark placed in ED 3/29  - montior Cr post diuresis, strict I&O    #BPH  - mark in place  - resume Flomax and finasteride once off NPO    #Metabolic Alkalosis  -  AB.42|69|129|45  - Acetazolamide 500 x1, Lasix 20     #Respiratory Acidosis   - ABG 7.21/89/53/36 > 7.23/83/66/35 > >> 7.39|65|90|39  - in the setting of AHRF,COPD exacerbation  - RESOLVED    _________MSK___________  no active issues     __________ID____________  #Aspiration PNA, CAP Coverage  - pt met SIRS criteria with tachycardia and WC 15 in ED  - episodic fevers, TMax 101, increased WBC 28  - lactate 1   - UA neg  - Sputum cx: baldomero consistent with normal ET Tube baldomero  - BCx: NGTD    - c/w ctx 2 g qd  - Azithromycin x 3 days    _________ENDO__________  #T2IDDM  - on novlog mix 70/30 bid at home, 58u in the AM, 46u in the evening + novolin R sliding scale   - Lantus 45 u at bedtime (originally decreased dose to 30 given NPO status, mod ISSq6 while NPO   - s/p NPH   - last a1c 9.4, 2024, most recent A1c 11.3     _______HEME/ONC_______  no active issues     _________SKIN____________  no active issues   Peripheral IVs     ________Prophylaxis_______  #DVT- heparin subq  #GI- ppi      __________GOC/ DISPO___________  FULL CODE, ICU    Pt is a 64M from [facility], w/ PMHx of CAD, CHF, DM, CYNTHIA on nocturnal bipap, COPD (multiple intubations in past on 3L of home O2), peripheral neuropathy, GERD, HTN, HLD, obesity, polyarthritis, Pulmonary HTN, TIA, BPH, depression/anxiety p/w respiratory distress from NH. Placed on BiPAP, s/p lasix 80, solumedrol 125 . Admitted for AHRF 2/2 COPD vs. CHF exacerbation.     #AHRF 2/2 COPD exacerbation vs. CHF exacerbation  #COPD  #HFpEF   #CAD  #HTN  #HLD  #CYNTHIA on nocturnal bipap  #Pulm HTN  #BPH  #Depression/Anxiety   #GERD    _________CNS___________  #Mentation  - s/p extubation   - Baseline mental status    #anxiety/depression  - Resumed Trazadone, risperidone, alprazolam, off NPO     _________CVS___________  #HFpEF   - not clinically overloaded in the ED, POCUS a-line predominant, grossly preserved systolic fxn   - repeat POCUS - some b-lines noted  - TTE from 10/2023- EF 50-55%, G1DD    - proBNP 11K  - Trop 145 > 101  - EKG: sinus tachycardia, RBBB (compared to prior)  - CXR shows no consolidation, congestion, effusions pending official read   - s/p lasix 80 x1  - repeat TTE: technically difficult read  - monitor I&O  -  acetazolamide 500 x 1, 20 lasix    #CAD  - c/w asa, statin    #HTN  - not on anti-hypertensives  - continue to monitor    #HLD   - c/w statin     _________ RESP__________  #AHRF 2/2 COPD exacerbation vs. CHF exacerbation  #COPD  #CYNTHIA  - s/p lasix 80 and solumedrol 125   - c/w solumedrol 40q12  - duoneb x3 doses stat, then q6 RTC    #CAP Coverage, Aspiration PNA  - Episodic fevers  - Increased secretions yesterday prior to intubation, possible aspiration event  - Increase ceftriaxone 2 g qd  - Start Azithromycin 500 qd 3 dayas    __________GI____________  #Nutrition  - DASH, Diabetic Diet    #GERD  - PPI     ________ RENAL__________  #RADHIKA  - baseline .66 in , now 1.4  - likely pre-renal in nature vs. post-renal obstruction/retention related   - mark placed in ED 3/29  - montior Cr post diuresis, strict I&O    #BPH  - mark in place  - resumde Flomax and finasteride    #Metabolic Alkalosis  -  AB.42|69|129|45  - Acetazolamide 500 x1, Lasix 20     #Respiratory Acidosis   - ABG 7.21/89/53/36 > 7.23/83/66/35 > >> 7.39|65|90|39  - in the setting of AHRF,COPD exacerbation  - RESOLVED    _________MSK___________  no active issues     __________ID____________  #Aspiration PNA, CAP Coverage  - pt met SIRS criteria with tachycardia and WC 15 in ED  - episodic fevers, TMax 101, increased WBC 28  - lactate 1   - UA neg  - Sputum cx: baldomero consistent with normal ET Tube baldomero  - BCx: NGTD    - c/w ctx 2 g qd  - Azithromycin x 3 days    _________ENDO__________  #T2IDDM  - on novlog mix 70/30 bid at home, 58u in the AM, 46u in the evening + novolin R sliding scale   - Lantus 45 u at bedtime (originally decreased dose to 30 given NPO status, mod ISSq6 while NPO   - s/p NPH   - last a1c 9.4, 2024, most recent A1c 11.3     _______HEME/ONC_______  no active issues     _________SKIN____________  no active issues   Peripheral IVs     ________Prophylaxis_______  #DVT- heparin subq  #GI- ppi      __________GOC/ DISPO___________  FULL CODE, ICU

## 2025-04-02 NOTE — PHYSICAL THERAPY INITIAL EVALUATION ADULT - PERTINENT HX OF CURRENT PROBLEM, REHAB EVAL
Pt is a 64M from [facility], w/ PMHx of CAD, CHF, DM, CYNTHIA on nocturnal bipap, COPD (multiple intubations in past on 3L of home O2), peripheral neuropathy, GERD, HTN, HLD, obesity, polyarthritis, Pulmonary HTN, TIA, BPH, depression/anxiety  p/w respiratory distress from NH. Pt mentating, A&Ox2-3, states that he has been having a productive cough, chest tightness, abdominal pain/gas. Otherwise pt appears mildly confused and anxious, unable to participate in full history taking    Pt has had multiple admissions since 2018 for COPD exacerbation.

## 2025-04-02 NOTE — PHYSICAL THERAPY INITIAL EVALUATION ADULT - FOLLOWS COMMANDS/ANSWERS QUESTIONS, REHAB EVAL
25% of the time/able to follow single-step instructions RASS -1, CAM-ICU positive/25% of the time/able to follow single-step instructions

## 2025-04-02 NOTE — PHYSICAL THERAPY INITIAL EVALUATION ADULT - DIAGNOSIS, PT EVAL
Admitting dx: AHRF 2/2 COPD exacerbation; PT dx: Due to pt medical course, pt presents with impairments in cognition and functional mobility, along with limitations in participation in ADLs.

## 2025-04-02 NOTE — PHYSICAL THERAPY INITIAL EVALUATION ADULT - GENERAL OBSERVATIONS, REHAB EVAL
Pt received in bed with HOB up, AxOx1. All cardiac monitoring intact as well as UE restraints. Pt currently receiving supplemental O2 via NC @ LPM. Pt received in bed with HOB up, AxOx1. All cardiac monitoring intact as well as UE restraints. Pt currently receiving supplemental O2 via NC @ 4 LPM.

## 2025-04-02 NOTE — CHART NOTE - NSCHARTNOTEFT_GEN_A_CORE
Spoke on phone with sister Chiquita Merrill. Updated regarding patient is extubated. Sister mentioned patient with possible history of bipolar/schizophrenia. Answered all questions.

## 2025-04-02 NOTE — PHYSICAL THERAPY INITIAL EVALUATION ADULT - MODALITIES TREATMENT COMMENTS
Pt was noticeably confused when prompted. He was unable to recall where he was correctly and answered incorrectly when asked his birthday, repeatedly stating his name instead. When asked to participate in AROM screen, pt refused to move extremities as asked. He exhibited irritation when prompted to do anything more than once, exclaiming "I'm fine" when being assessed.

## 2025-04-02 NOTE — PHYSICAL THERAPY INITIAL EVALUATION ADULT - LEVEL OF CONSCIOUSNESS, REHAB EVAL
Pt is currently confused and bordering on agitated s/p extubation. He refuses to participate in PT eval, becoming irritable when asked to perform AROM. He stated "I am not feeling this right now, I am fine."/confused

## 2025-04-02 NOTE — PHYSICAL THERAPY INITIAL EVALUATION ADULT - IMPAIRMENTS FOUND, PT EVAL
aerobic capacity/endurance/arousal, attention, and cognition/cognitive impairment/gait, locomotion, and balance/joint integrity and mobility/muscle strength/posture/ventilation and respiration/gas exchange

## 2025-04-02 NOTE — PHYSICAL THERAPY INITIAL EVALUATION ADULT - NSPTDISCHREC_GEN_A_CORE
Currently recommending TACO as pt presents with impairments that require further skilled PT services to optimize function./Sub-acute Rehab

## 2025-04-02 NOTE — PHYSICAL THERAPY INITIAL EVALUATION ADULT - PASSIVE RANGE OF MOTION EXAMINATION, REHAB EVAL
-Start PT and home exercises  -Gabapentin 300mg TID   -Follow up in 4 weeks bilateral upper extremity Passive ROM was WNL (within normal limits)/bilateral lower extremity Passive ROM was WNL

## 2025-04-02 NOTE — PHYSICAL THERAPY INITIAL EVALUATION ADULT - PRECAUTIONS/LIMITATIONS, REHAB EVAL
69M with severe cervical spine stenosis presents to  for elective sx.
4LPM via NC/hearing precautions/oxygen therapy device and L/min

## 2025-04-02 NOTE — CHART NOTE - NSCHARTNOTEFT_GEN_A_CORE
Patient extubated to 4L nasal cannula after passing SAT/SBT. Patient adequately clearing secretions, appropriate cough response. Patient tolerated procedure well, no acute complaints. Patient extubated to 4L nasal cannula after passing SAT/SBT. Patient adequately clearing secretions, appropriate cough response. Patient tolerated procedure well, with no complications.

## 2025-04-02 NOTE — PHYSICAL THERAPY INITIAL EVALUATION ADULT - MANUAL MUSCLE TESTING RESULTS, REHAB EVAL
pt mentation; observed pt complete movements against gravity, on his own without prompt./not tested due to

## 2025-04-02 NOTE — PROGRESS NOTE ADULT - SUBJECTIVE AND OBJECTIVE BOX
INTERVAL HPI/OVERNIGHT EVENTS: No acute events overnight. Patient underwent trial of SAT at 5 AM, and SBT. Patient extubated, tolerating     PRESSORS: [ ] YES [ ] NO  WHICH:    Antimicrobial:  azithromycin  IVPB      azithromycin  IVPB 500 milliGRAM(s) IV Intermittent every 24 hours  cefTRIAXone   IVPB 2000 milliGRAM(s) IV Intermittent every 24 hours    Cardiovascular:  norepinephrine Infusion 0.05 MICROgram(s)/kG/Min IV Continuous <Continuous>    Pulmonary:  albuterol/ipratropium for Nebulization 3 milliLiter(s) Nebulizer every 6 hours    Hematalogic:  heparin   Injectable 5000 Unit(s) SubCutaneous every 8 hours    Other:  acetaminophen   IVPB .. 1000 milliGRAM(s) IV Intermittent once  artificial  tears Solution 1 Drop(s) Both EYES two times a day  chlorhexidine 0.12% Liquid 15 milliLiter(s) Oral Mucosa every 12 hours  chlorhexidine 2% Cloths 1 Application(s) Topical <User Schedule>  insulin glargine Injectable (LANTUS) 45 Unit(s) SubCutaneous at bedtime  insulin lispro (ADMELOG) corrective regimen sliding scale   SubCutaneous three times a day before meals  latanoprost 0.005% Ophthalmic Solution 1 Drop(s) Both EYES at bedtime  methylPREDNISolone sodium succinate Injectable 40 milliGRAM(s) IV Push daily  nicotine -  14 mG/24Hr(s) Patch 1 Patch Transdermal daily  pantoprazole  Injectable 40 milliGRAM(s) IV Push daily    acetaminophen   IVPB .. 1000 milliGRAM(s) IV Intermittent once  albuterol/ipratropium for Nebulization 3 milliLiter(s) Nebulizer every 6 hours  artificial  tears Solution 1 Drop(s) Both EYES two times a day  azithromycin  IVPB      azithromycin  IVPB 500 milliGRAM(s) IV Intermittent every 24 hours  cefTRIAXone   IVPB 2000 milliGRAM(s) IV Intermittent every 24 hours  chlorhexidine 0.12% Liquid 15 milliLiter(s) Oral Mucosa every 12 hours  chlorhexidine 2% Cloths 1 Application(s) Topical <User Schedule>  heparin   Injectable 5000 Unit(s) SubCutaneous every 8 hours  insulin glargine Injectable (LANTUS) 45 Unit(s) SubCutaneous at bedtime  insulin lispro (ADMELOG) corrective regimen sliding scale   SubCutaneous three times a day before meals  latanoprost 0.005% Ophthalmic Solution 1 Drop(s) Both EYES at bedtime  methylPREDNISolone sodium succinate Injectable 40 milliGRAM(s) IV Push daily  nicotine -  14 mG/24Hr(s) Patch 1 Patch Transdermal daily  norepinephrine Infusion 0.05 MICROgram(s)/kG/Min IV Continuous <Continuous>  pantoprazole  Injectable 40 milliGRAM(s) IV Push daily    Drug Dosing Weight  Height (cm): 172.7 (29 Mar 2025 16:46)  Weight (kg): 96.3 (29 Mar 2025 16:46)  BMI (kg/m2): 32.3 (29 Mar 2025 16:46)  BSA (m2): 2.1 (29 Mar 2025 16:46)    CENTRAL LINE: [ ] YES [ ] NO  LOCATION:   DATE INSERTED:  REMOVE: [ ] YES [ ] NO  EXPLAIN:    AMOR: [ ] YES [ ] NO    DATE INSERTED:  REMOVE:  [ ] YES [ ] NO  EXPLAIN:    A-LINE:  [ ] YES [ ] NO  LOCATION:   DATE INSERTED:  REMOVE:  [ ] YES [ ] NO  EXPLAIN:    PMH -reviewed admission note, no change since admission  PAST MEDICAL & SURGICAL HISTORY:  COPD (chronic obstructive pulmonary disease)  Oxygen 2-3L at home      Stented coronary artery  2017      HLD (hyperlipidemia)      Pulmonary HTN      Type 2 diabetes mellitus without complication, with long-term current use of insulin      DM (diabetes mellitus)      CAD (coronary artery disease)      GERD (gastroesophageal reflux disease)      Insomnia      CHF (congestive heart failure)      HTN (hypertension)      Mood disorder      COPD (chronic obstructive pulmonary disease)      HTN (hypertension)      DM (diabetes mellitus)      CAD (coronary artery disease)      Bipolar disorder      CYNTHIA treated with BiPAP      No significant past surgical history          ICU Vital Signs Last 24 Hrs  T(C): 38.4 (2025 13:15), Max: 38.8 (2025 23:15)  T(F): 101.1 (2025 13:15), Max: 101.8 (2025 23:15)  HR: 77 (2025 13:15) (69 - 150)  BP: 140/62 (2025 13:15) (69/51 - 164/73)  BP(mean): 85 (2025 13:15) (58 - 103)  ABP: --  ABP(mean): --  RR: 27 (2025 13:15) (12 - 27)  SpO2: 95% (2025 13:15) (91% - 100%)    O2 Parameters below as of 2025 13:21    O2 Flow (L/min): 4  O2 Concentration (%): 36        ABG - ( 2025 03:20 )  pH, Arterial: 7.42  pH, Blood: x     /  pCO2: 69    /  pO2: 129   / HCO3: 45    / Base Excess: 16.6  /  SaO2: 100                   04- @ 07:01  -  04-02 @ 07:00  --------------------------------------------------------  IN: 1399.8 mL / OUT: 1300 mL / NET: 99.8 mL        Mode: CPAP with PS  FiO2: 40  PEEP: 6  PS: 6      PHYSICAL EXAM:    GENERAL: NAD, well-groomed, well-developed  HEAD:  Atraumatic, Normocephalic  EYES: EOMI, PERRLA, conjunctiva and sclera clear  ENMT: No tonsillar erythema, exudates, or enlargement; Moist mucous membranes, Good dentition, No lesions  NECK: Supple, normal appearance, No JVD; Normal thyroid; Trachea midline  NERVOUS SYSTEM:  Alert & Oriented X3,  Motor Strength 5/5 B/L upper and lower extremities; DTRs 2+ intact and symmetric  CHEST/LUNG: No chest deformity; Normal percussion bilaterally; No rales, rhonchi, wheezing   HEART: Regular rate and rhythm; No murmurs, rubs, or gallops  ABDOMEN: Soft, Nontender, Nondistended; Bowel sounds present  EXTREMITIES:  2+ Peripheral Pulses, No clubbing, cyanosis, or edema  LYMPH: No lymphadenopathy noted  SKIN: No rashes or lesions;  Good capillary refill      LABS:  CBC Full  -  ( 2025 04:30 )  WBC Count : 12.51 K/uL  RBC Count : 4.87 M/uL  Hemoglobin : 13.5 g/dL  Hematocrit : 44.3 %  Platelet Count - Automated : 193 K/uL  Mean Cell Volume : 91.0 fl  Mean Cell Hemoglobin : 27.7 pg  Mean Cell Hemoglobin Concentration : 30.5 g/dL  Auto Neutrophil # : x  Auto Lymphocyte # : x  Auto Monocyte # : x  Auto Eosinophil # : x  Auto Basophil # : x  Auto Neutrophil % : x  Auto Lymphocyte % : x  Auto Monocyte % : x  Auto Eosinophil % : x  Auto Basophil % : x        138  |  104  |  38[H]  ----------------------------<  299[H]  4.4   |  31  |  0.97    Ca    7.8[L]      2025 04:30  Phos  2.6     04-  Mg     2.8     -    TPro  6.7  /  Alb  2.6[L]  /  TBili  0.3  /  DBili  x   /  AST  21  /  ALT  24  /  AlkPhos  71  -      Urinalysis Basic - ( 2025 10:25 )    Color: Yellow / Appearance: Clear / S.032 / pH: x  Gluc: x / Ketone: Trace mg/dL  / Bili: Negative / Urobili: 1.0 mg/dL   Blood: x / Protein: 100 mg/dL / Nitrite: Negative   Leuk Esterase: Small / RBC: 5 /HPF / WBC Too Numerous to count /HPF   Sq Epi: x / Non Sq Epi: x / Bacteria: Too Numerous to count /HPF      Culture Results:   Commensal baldomero consistent with body site ( @ 19:57)      RADIOLOGY & ADDITIONAL STUDIES REVIEWED:  ***    [ ]GOALS OF CARE DISCUSSION WITH PATIENT/FAMILY/PROXY:    CRITICAL CARE TIME SPENT: 35 minutes INTERVAL HPI/OVERNIGHT EVENTS: No acute events overnight. Patient underwent trial of SAT at 5 AM, and SBT. Patient extubated, tolerating well.    PRESSORS: [ ] YES [X] NO  WHICH:    Antimicrobial:  azithromycin  IVPB      azithromycin  IVPB 500 milliGRAM(s) IV Intermittent every 24 hours  cefTRIAXone   IVPB 2000 milliGRAM(s) IV Intermittent every 24 hours    Cardiovascular:  norepinephrine Infusion 0.05 MICROgram(s)/kG/Min IV Continuous <Continuous>    Pulmonary:  albuterol/ipratropium for Nebulization 3 milliLiter(s) Nebulizer every 6 hours    Hematalogic:  heparin   Injectable 5000 Unit(s) SubCutaneous every 8 hours    Other:  acetaminophen   IVPB .. 1000 milliGRAM(s) IV Intermittent once  artificial  tears Solution 1 Drop(s) Both EYES two times a day  chlorhexidine 0.12% Liquid 15 milliLiter(s) Oral Mucosa every 12 hours  chlorhexidine 2% Cloths 1 Application(s) Topical <User Schedule>  insulin glargine Injectable (LANTUS) 45 Unit(s) SubCutaneous at bedtime  insulin lispro (ADMELOG) corrective regimen sliding scale   SubCutaneous three times a day before meals  latanoprost 0.005% Ophthalmic Solution 1 Drop(s) Both EYES at bedtime  methylPREDNISolone sodium succinate Injectable 40 milliGRAM(s) IV Push daily  nicotine -  14 mG/24Hr(s) Patch 1 Patch Transdermal daily  pantoprazole  Injectable 40 milliGRAM(s) IV Push daily    acetaminophen   IVPB .. 1000 milliGRAM(s) IV Intermittent once  albuterol/ipratropium for Nebulization 3 milliLiter(s) Nebulizer every 6 hours  artificial  tears Solution 1 Drop(s) Both EYES two times a day  azithromycin  IVPB      azithromycin  IVPB 500 milliGRAM(s) IV Intermittent every 24 hours  cefTRIAXone   IVPB 2000 milliGRAM(s) IV Intermittent every 24 hours  chlorhexidine 0.12% Liquid 15 milliLiter(s) Oral Mucosa every 12 hours  chlorhexidine 2% Cloths 1 Application(s) Topical <User Schedule>  heparin   Injectable 5000 Unit(s) SubCutaneous every 8 hours  insulin glargine Injectable (LANTUS) 45 Unit(s) SubCutaneous at bedtime  insulin lispro (ADMELOG) corrective regimen sliding scale   SubCutaneous three times a day before meals  latanoprost 0.005% Ophthalmic Solution 1 Drop(s) Both EYES at bedtime  methylPREDNISolone sodium succinate Injectable 40 milliGRAM(s) IV Push daily  nicotine -  14 mG/24Hr(s) Patch 1 Patch Transdermal daily  norepinephrine Infusion 0.05 MICROgram(s)/kG/Min IV Continuous <Continuous>  pantoprazole  Injectable 40 milliGRAM(s) IV Push daily    Drug Dosing Weight  Height (cm): 172.7 (29 Mar 2025 16:46)  Weight (kg): 96.3 (29 Mar 2025 16:46)  BMI (kg/m2): 32.3 (29 Mar 2025 16:46)  BSA (m2): 2.1 (29 Mar 2025 16:46)    CENTRAL LINE: [ ] YES [X] NO  LOCATION:   DATE INSERTED:  REMOVE: [ ] YES [ ] NO  EXPLAIN:    AMOR: [X] YES [ ] NO    DATE INSERTED:  REMOVE:  [ ] YES [ ] NO  EXPLAIN:    A-LINE:  [ ] YES [X] NO  LOCATION:   DATE INSERTED:  REMOVE:  [ ] YES [ ] NO  EXPLAIN:    PMH -reviewed admission note, no change since admission  PAST MEDICAL & SURGICAL HISTORY:  COPD (chronic obstructive pulmonary disease)  Oxygen 2-3L at home      Stented coronary artery  2017      HLD (hyperlipidemia)      Pulmonary HTN      Type 2 diabetes mellitus without complication, with long-term current use of insulin      DM (diabetes mellitus)      CAD (coronary artery disease)      GERD (gastroesophageal reflux disease)      Insomnia      CHF (congestive heart failure)      HTN (hypertension)      Mood disorder      COPD (chronic obstructive pulmonary disease)      HTN (hypertension)      DM (diabetes mellitus)      CAD (coronary artery disease)      Bipolar disorder      CYTNHIA treated with BiPAP      No significant past surgical history          ICU Vital Signs Last 24 Hrs  T(C): 38.4 (2025 13:15), Max: 38.8 (2025 23:15)  T(F): 101.1 (2025 13:15), Max: 101.8 (2025 23:15)  HR: 77 (2025 13:15) (69 - 150)  BP: 140/62 (2025 13:15) (69/51 - 164/73)  BP(mean): 85 (2025 13:15) (58 - 103)  ABP: --  ABP(mean): --  RR: 27 (2025 13:15) (12 - 27)  SpO2: 95% (2025 13:15) (91% - 100%)    O2 Parameters below as of 2025 13:21    O2 Flow (L/min): 4  O2 Concentration (%): 36        ABG - ( 2025 03:20 )  pH, Arterial: 7.42  pH, Blood: x     /  pCO2: 69    /  pO2: 129   / HCO3: 45    / Base Excess: 16.6  /  SaO2: 100                   04-01 @ 07:01  -  04-02 @ 07:00  --------------------------------------------------------  IN: 1399.8 mL / OUT: 1300 mL / NET: 99.8 mL        Mode: CPAP with PS  FiO2: 40  PEEP: 6  PS: 6      PHYSICAL EXAM:    GENERAL: Alert, awake, responding to commands/questions, extubated  HEAD:  Atraumatic, Normocephalic  EYES: PERRLA  ENMT: No tonsillar erythema, exudates, or enlargement; Continued secretions  NECK: Supple, No JVD, Normal thyroid  HEART: Regular rate and rhythm; No murmurs, rubs, or gallops  RESPIRATORY: Lung sounds difficult to appreciate due to body habitus, decreased breath sounds b/l, some crackles  ABDOMEN: Soft, Nontender, Nondistended; Bowel sounds present  NEUROLOGY: AAO  EXTREMITIES:  2+ Peripheral Pulses, No clubbing, cyanosis, or edema  SKIN: warm, dry, normal color, no rash or abnormal lesions      LABS:  CBC Full  -  ( 2025 04:30 )  WBC Count : 12.51 K/uL  RBC Count : 4.87 M/uL  Hemoglobin : 13.5 g/dL  Hematocrit : 44.3 %  Platelet Count - Automated : 193 K/uL  Mean Cell Volume : 91.0 fl  Mean Cell Hemoglobin : 27.7 pg  Mean Cell Hemoglobin Concentration : 30.5 g/dL  Auto Neutrophil # : x  Auto Lymphocyte # : x  Auto Monocyte # : x  Auto Eosinophil # : x  Auto Basophil # : x  Auto Neutrophil % : x  Auto Lymphocyte % : x  Auto Monocyte % : x  Auto Eosinophil % : x  Auto Basophil % : x        138  |  104  |  38[H]  ----------------------------<  299[H]  4.4   |  31  |  0.97    Ca    7.8[L]      2025 04:30  Phos  2.6     04-02  Mg     2.8     04-02    TPro  6.7  /  Alb  2.6[L]  /  TBili  0.3  /  DBili  x   /  AST  21  /  ALT  24  /  AlkPhos  71  04-02      Urinalysis Basic - ( 2025 10:25 )    Color: Yellow / Appearance: Clear / S.032 / pH: x  Gluc: x / Ketone: Trace mg/dL  / Bili: Negative / Urobili: 1.0 mg/dL   Blood: x / Protein: 100 mg/dL / Nitrite: Negative   Leuk Esterase: Small / RBC: 5 /HPF / WBC Too Numerous to count /HPF   Sq Epi: x / Non Sq Epi: x / Bacteria: Too Numerous to count /HPF      Culture Results:   Commensal baldomero consistent with body site ( @ 19:57)      RADIOLOGY & ADDITIONAL STUDIES REVIEWED:  ***    [ ]GOALS OF CARE DISCUSSION WITH PATIENT/FAMILY/PROXY:    CRITICAL CARE TIME SPENT: 35 minutes

## 2025-04-03 LAB
ALBUMIN SERPL ELPH-MCNC: 2.5 G/DL — LOW (ref 3.5–5)
ALP SERPL-CCNC: 63 U/L — SIGNIFICANT CHANGE UP (ref 40–120)
ALT FLD-CCNC: 21 U/L DA — SIGNIFICANT CHANGE UP (ref 10–60)
ANION GAP SERPL CALC-SCNC: 6 MMOL/L — SIGNIFICANT CHANGE UP (ref 5–17)
AST SERPL-CCNC: 18 U/L — SIGNIFICANT CHANGE UP (ref 10–40)
BASOPHILS # BLD AUTO: 0.02 K/UL — SIGNIFICANT CHANGE UP (ref 0–0.2)
BASOPHILS NFR BLD AUTO: 0.2 % — SIGNIFICANT CHANGE UP (ref 0–2)
BILIRUB SERPL-MCNC: 0.5 MG/DL — SIGNIFICANT CHANGE UP (ref 0.2–1.2)
BUN SERPL-MCNC: 26 MG/DL — HIGH (ref 7–18)
CALCIUM SERPL-MCNC: 8.2 MG/DL — LOW (ref 8.4–10.5)
CHLORIDE SERPL-SCNC: 99 MMOL/L — SIGNIFICANT CHANGE UP (ref 96–108)
CO2 SERPL-SCNC: 30 MMOL/L — SIGNIFICANT CHANGE UP (ref 22–31)
CREAT SERPL-MCNC: 0.73 MG/DL — SIGNIFICANT CHANGE UP (ref 0.5–1.3)
CULTURE RESULTS: SIGNIFICANT CHANGE UP
CULTURE RESULTS: SIGNIFICANT CHANGE UP
EGFR: 103 ML/MIN/1.73M2 — SIGNIFICANT CHANGE UP
EGFR: 103 ML/MIN/1.73M2 — SIGNIFICANT CHANGE UP
EOSINOPHIL # BLD AUTO: 0.15 K/UL — SIGNIFICANT CHANGE UP (ref 0–0.5)
EOSINOPHIL NFR BLD AUTO: 1.7 % — SIGNIFICANT CHANGE UP (ref 0–6)
GLUCOSE BLDC GLUCOMTR-MCNC: 211 MG/DL — HIGH (ref 70–99)
GLUCOSE BLDC GLUCOMTR-MCNC: 283 MG/DL — HIGH (ref 70–99)
GLUCOSE BLDC GLUCOMTR-MCNC: 315 MG/DL — HIGH (ref 70–99)
GLUCOSE BLDC GLUCOMTR-MCNC: 366 MG/DL — HIGH (ref 70–99)
GLUCOSE SERPL-MCNC: 230 MG/DL — HIGH (ref 70–99)
HCT VFR BLD CALC: 36.8 % — LOW (ref 39–50)
HGB BLD-MCNC: 11.3 G/DL — LOW (ref 13–17)
IMM GRANULOCYTES NFR BLD AUTO: 1.6 % — HIGH (ref 0–0.9)
LYMPHOCYTES # BLD AUTO: 2.21 K/UL — SIGNIFICANT CHANGE UP (ref 1–3.3)
LYMPHOCYTES # BLD AUTO: 25.2 % — SIGNIFICANT CHANGE UP (ref 13–44)
MAGNESIUM SERPL-MCNC: 2.4 MG/DL — SIGNIFICANT CHANGE UP (ref 1.6–2.6)
MCHC RBC-ENTMCNC: 27.3 PG — SIGNIFICANT CHANGE UP (ref 27–34)
MCHC RBC-ENTMCNC: 30.7 G/DL — LOW (ref 32–36)
MCV RBC AUTO: 88.9 FL — SIGNIFICANT CHANGE UP (ref 80–100)
MONOCYTES # BLD AUTO: 0.67 K/UL — SIGNIFICANT CHANGE UP (ref 0–0.9)
MONOCYTES NFR BLD AUTO: 7.6 % — SIGNIFICANT CHANGE UP (ref 2–14)
NEUTROPHILS # BLD AUTO: 5.59 K/UL — SIGNIFICANT CHANGE UP (ref 1.8–7.4)
NEUTROPHILS NFR BLD AUTO: 63.7 % — SIGNIFICANT CHANGE UP (ref 43–77)
NRBC BLD AUTO-RTO: 0 /100 WBCS — SIGNIFICANT CHANGE UP (ref 0–0)
PHOSPHATE SERPL-MCNC: 2.3 MG/DL — LOW (ref 2.5–4.5)
PLATELET # BLD AUTO: 156 K/UL — SIGNIFICANT CHANGE UP (ref 150–400)
POTASSIUM SERPL-MCNC: 3.7 MMOL/L — SIGNIFICANT CHANGE UP (ref 3.5–5.3)
POTASSIUM SERPL-SCNC: 3.7 MMOL/L — SIGNIFICANT CHANGE UP (ref 3.5–5.3)
PROT SERPL-MCNC: 6.6 G/DL — SIGNIFICANT CHANGE UP (ref 6–8.3)
RBC # BLD: 4.14 M/UL — LOW (ref 4.2–5.8)
RBC # FLD: 13.7 % — SIGNIFICANT CHANGE UP (ref 10.3–14.5)
SODIUM SERPL-SCNC: 135 MMOL/L — SIGNIFICANT CHANGE UP (ref 135–145)
SPECIMEN SOURCE: SIGNIFICANT CHANGE UP
SPECIMEN SOURCE: SIGNIFICANT CHANGE UP
UUN UR-MCNC: 1733 MG/DL — SIGNIFICANT CHANGE UP
WBC # BLD: 8.78 K/UL — SIGNIFICANT CHANGE UP (ref 3.8–10.5)
WBC # FLD AUTO: 8.78 K/UL — SIGNIFICANT CHANGE UP (ref 3.8–10.5)

## 2025-04-03 RX ORDER — FUROSEMIDE 10 MG/ML
40 INJECTION INTRAMUSCULAR; INTRAVENOUS DAILY
Refills: 0 | Status: DISCONTINUED | OUTPATIENT
Start: 2025-04-03 | End: 2025-04-09

## 2025-04-03 RX ORDER — TIOTROPIUM BROMIDE INHALATION SPRAY 3.12 UG/1
2 SPRAY, METERED RESPIRATORY (INHALATION) DAILY
Refills: 0 | Status: DISCONTINUED | OUTPATIENT
Start: 2025-04-03 | End: 2025-04-09

## 2025-04-03 RX ORDER — SOD PHOS DI, MONO/K PHOS MONO 250 MG
1 TABLET ORAL
Refills: 0 | Status: DISCONTINUED | OUTPATIENT
Start: 2025-04-03 | End: 2025-04-09

## 2025-04-03 RX ORDER — IPRATROPIUM BROMIDE AND ALBUTEROL SULFATE .5; 2.5 MG/3ML; MG/3ML
3 SOLUTION RESPIRATORY (INHALATION) EVERY 6 HOURS
Refills: 0 | Status: DISCONTINUED | OUTPATIENT
Start: 2025-04-03 | End: 2025-04-09

## 2025-04-03 RX ADMIN — IPRATROPIUM BROMIDE AND ALBUTEROL SULFATE 3 MILLILITER(S): .5; 2.5 SOLUTION RESPIRATORY (INHALATION) at 08:57

## 2025-04-03 RX ADMIN — Medication 0.5 MILLIGRAM(S): at 09:49

## 2025-04-03 RX ADMIN — NICOTINE POLACRILEX 1 PATCH: 4 GUM, CHEWING ORAL at 19:00

## 2025-04-03 RX ADMIN — Medication 1 APPLICATION(S): at 06:03

## 2025-04-03 RX ADMIN — Medication 1 DROP(S): at 17:05

## 2025-04-03 RX ADMIN — TAMSULOSIN HYDROCHLORIDE 0.4 MILLIGRAM(S): 0.4 CAPSULE ORAL at 21:23

## 2025-04-03 RX ADMIN — IPRATROPIUM BROMIDE AND ALBUTEROL SULFATE 3 MILLILITER(S): .5; 2.5 SOLUTION RESPIRATORY (INHALATION) at 15:05

## 2025-04-03 RX ADMIN — HEPARIN SODIUM 5000 UNIT(S): 1000 INJECTION INTRAVENOUS; SUBCUTANEOUS at 13:10

## 2025-04-03 RX ADMIN — ATORVASTATIN CALCIUM 20 MILLIGRAM(S): 80 TABLET, FILM COATED ORAL at 21:23

## 2025-04-03 RX ADMIN — INSULIN LISPRO 2: 100 INJECTION, SOLUTION INTRAVENOUS; SUBCUTANEOUS at 21:21

## 2025-04-03 RX ADMIN — HEPARIN SODIUM 5000 UNIT(S): 1000 INJECTION INTRAVENOUS; SUBCUTANEOUS at 06:13

## 2025-04-03 RX ADMIN — INSULIN LISPRO 3 UNIT(S): 100 INJECTION, SOLUTION INTRAVENOUS; SUBCUTANEOUS at 09:14

## 2025-04-03 RX ADMIN — INSULIN LISPRO 5: 100 INJECTION, SOLUTION INTRAVENOUS; SUBCUTANEOUS at 16:57

## 2025-04-03 RX ADMIN — Medication 81 MILLIGRAM(S): at 11:55

## 2025-04-03 RX ADMIN — Medication 0.25 MILLIGRAM(S): at 21:23

## 2025-04-03 RX ADMIN — Medication 13 UNIT(S): at 18:10

## 2025-04-03 RX ADMIN — Medication 1 TABLET(S): at 06:15

## 2025-04-03 RX ADMIN — NICOTINE POLACRILEX 1 PATCH: 4 GUM, CHEWING ORAL at 11:54

## 2025-04-03 RX ADMIN — INSULIN LISPRO 4: 100 INJECTION, SOLUTION INTRAVENOUS; SUBCUTANEOUS at 11:54

## 2025-04-03 RX ADMIN — Medication 150 MILLIGRAM(S): at 11:55

## 2025-04-03 RX ADMIN — NICOTINE POLACRILEX 1 PATCH: 4 GUM, CHEWING ORAL at 09:08

## 2025-04-03 RX ADMIN — INSULIN LISPRO 3 UNIT(S): 100 INJECTION, SOLUTION INTRAVENOUS; SUBCUTANEOUS at 16:57

## 2025-04-03 RX ADMIN — Medication 46 UNIT(S): at 21:22

## 2025-04-03 RX ADMIN — CEFTRIAXONE 100 MILLIGRAM(S): 500 INJECTION, POWDER, FOR SOLUTION INTRAMUSCULAR; INTRAVENOUS at 09:17

## 2025-04-03 RX ADMIN — Medication 250 MILLIGRAM(S): at 10:06

## 2025-04-03 RX ADMIN — Medication 1 DROP(S): at 06:15

## 2025-04-03 RX ADMIN — TIOTROPIUM BROMIDE INHALATION SPRAY 2 PUFF(S): 3.12 SPRAY, METERED RESPIRATORY (INHALATION) at 13:10

## 2025-04-03 RX ADMIN — INSULIN LISPRO 2: 100 INJECTION, SOLUTION INTRAVENOUS; SUBCUTANEOUS at 06:23

## 2025-04-03 RX ADMIN — FINASTERIDE 5 MILLIGRAM(S): 1 TABLET, FILM COATED ORAL at 11:55

## 2025-04-03 RX ADMIN — METHYLPREDNISOLONE ACETATE 40 MILLIGRAM(S): 80 INJECTION, SUSPENSION INTRA-ARTICULAR; INTRALESIONAL; INTRAMUSCULAR; SOFT TISSUE at 06:13

## 2025-04-03 RX ADMIN — INSULIN LISPRO 3 UNIT(S): 100 INJECTION, SOLUTION INTRAVENOUS; SUBCUTANEOUS at 11:53

## 2025-04-03 RX ADMIN — FUROSEMIDE 40 MILLIGRAM(S): 10 INJECTION INTRAMUSCULAR; INTRAVENOUS at 13:10

## 2025-04-03 RX ADMIN — Medication 45 UNIT(S): at 06:25

## 2025-04-03 RX ADMIN — NICOTINE POLACRILEX 1 PATCH: 4 GUM, CHEWING ORAL at 11:59

## 2025-04-03 RX ADMIN — Medication 1 DOSE(S): at 21:28

## 2025-04-03 RX ADMIN — LATANOPROST PF 1 DROP(S): 0.05 SOLUTION/ DROPS OPHTHALMIC at 21:21

## 2025-04-03 RX ADMIN — Medication 1 TABLET(S): at 17:05

## 2025-04-03 NOTE — PROGRESS NOTE ADULT - SUBJECTIVE AND OBJECTIVE BOX
INTERVAL HPI/OVERNIGHT EVENTS: No acute events overnight. Patient currently denies any SOB, chest pain. Reports some lower chronic back pain.     PRESSORS: [ ] YES [X] NO  WHICH:    Antimicrobial:  azithromycin  IVPB      azithromycin  IVPB 500 milliGRAM(s) IV Intermittent every 24 hours  cefTRIAXone   IVPB 2000 milliGRAM(s) IV Intermittent every 24 hours    Cardiovascular:  furosemide    Tablet 40 milliGRAM(s) Oral daily    Pulmonary:  albuterol/ipratropium for Nebulization 3 milliLiter(s) Nebulizer every 6 hours PRN  fluticasone propionate/ salmeterol 250-50 MICROgram(s) Diskus 1 Dose(s) Inhalation two times a day  tiotropium 2.5 MICROgram(s) Inhaler 2 Puff(s) Inhalation daily    Hematalogic:  aspirin  chewable 81 milliGRAM(s) Oral daily  heparin   Injectable 5000 Unit(s) SubCutaneous every 8 hours    Other:  ALPRAZolam 0.5 milliGRAM(s) Oral three times a day PRN  artificial  tears Solution 1 Drop(s) Both EYES two times a day  atorvastatin 20 milliGRAM(s) Oral at bedtime  finasteride 5 milliGRAM(s) Oral daily  insulin lispro (ADMELOG) corrective regimen sliding scale   SubCutaneous three times a day before meals  insulin lispro (ADMELOG) corrective regimen sliding scale   SubCutaneous at bedtime  insulin lispro Injectable (ADMELOG) 3 Unit(s) SubCutaneous three times a day before meals  insulin NPH human recombinant 45 Unit(s) SubCutaneous before breakfast  insulin NPH human recombinant 35 Unit(s) SubCutaneous at bedtime  latanoprost 0.005% Ophthalmic Solution 1 Drop(s) Both EYES at bedtime  methylPREDNISolone sodium succinate Injectable 40 milliGRAM(s) IV Push daily  nicotine -  14 mG/24Hr(s) Patch 1 Patch Transdermal daily  potassium phosphate / sodium phosphate Tablet (K-PHOS No. 2) 1 Tablet(s) Oral two times a day  risperiDONE   Tablet 0.25 milliGRAM(s) Oral at bedtime  tamsulosin 0.4 milliGRAM(s) Oral at bedtime  traZODone 150 milliGRAM(s) Oral daily    albuterol/ipratropium for Nebulization 3 milliLiter(s) Nebulizer every 6 hours PRN  ALPRAZolam 0.5 milliGRAM(s) Oral three times a day PRN  artificial  tears Solution 1 Drop(s) Both EYES two times a day  aspirin  chewable 81 milliGRAM(s) Oral daily  atorvastatin 20 milliGRAM(s) Oral at bedtime  azithromycin  IVPB      azithromycin  IVPB 500 milliGRAM(s) IV Intermittent every 24 hours  cefTRIAXone   IVPB 2000 milliGRAM(s) IV Intermittent every 24 hours  finasteride 5 milliGRAM(s) Oral daily  fluticasone propionate/ salmeterol 250-50 MICROgram(s) Diskus 1 Dose(s) Inhalation two times a day  furosemide    Tablet 40 milliGRAM(s) Oral daily  heparin   Injectable 5000 Unit(s) SubCutaneous every 8 hours  insulin lispro (ADMELOG) corrective regimen sliding scale   SubCutaneous three times a day before meals  insulin lispro (ADMELOG) corrective regimen sliding scale   SubCutaneous at bedtime  insulin lispro Injectable (ADMELOG) 3 Unit(s) SubCutaneous three times a day before meals  insulin NPH human recombinant 45 Unit(s) SubCutaneous before breakfast  insulin NPH human recombinant 35 Unit(s) SubCutaneous at bedtime  latanoprost 0.005% Ophthalmic Solution 1 Drop(s) Both EYES at bedtime  methylPREDNISolone sodium succinate Injectable 40 milliGRAM(s) IV Push daily  nicotine -  14 mG/24Hr(s) Patch 1 Patch Transdermal daily  potassium phosphate / sodium phosphate Tablet (K-PHOS No. 2) 1 Tablet(s) Oral two times a day  risperiDONE   Tablet 0.25 milliGRAM(s) Oral at bedtime  tamsulosin 0.4 milliGRAM(s) Oral at bedtime  tiotropium 2.5 MICROgram(s) Inhaler 2 Puff(s) Inhalation daily  traZODone 150 milliGRAM(s) Oral daily    Drug Dosing Weight  Height (cm): 172.7 (29 Mar 2025 16:46)  Weight (kg): 96.3 (29 Mar 2025 16:46)  BMI (kg/m2): 32.3 (29 Mar 2025 16:46)  BSA (m2): 2.1 (29 Mar 2025 16:46)    CENTRAL LINE: [ ] YES [X] NO  LOCATION:   DATE INSERTED:  REMOVE: [ ] YES [ ] NO  EXPLAIN:    AMOR: [X] YES [ ] NO    DATE INSERTED:  REMOVE:  [ ] YES [ ] NO  EXPLAIN:    A-LINE:  [ ] YES [X] NO  LOCATION:   DATE INSERTED:  REMOVE:  [ ] YES [ ] NO  EXPLAIN:    PMH -reviewed admission note, no change since admission  PAST MEDICAL & SURGICAL HISTORY:  COPD (chronic obstructive pulmonary disease)  Oxygen 2-3L at home      Stented coronary artery  2017      HLD (hyperlipidemia)      Pulmonary HTN      Type 2 diabetes mellitus without complication, with long-term current use of insulin      DM (diabetes mellitus)      CAD (coronary artery disease)      GERD (gastroesophageal reflux disease)      Insomnia      CHF (congestive heart failure)      HTN (hypertension)      Mood disorder      COPD (chronic obstructive pulmonary disease)      HTN (hypertension)      DM (diabetes mellitus)      CAD (coronary artery disease)      Bipolar disorder      CYNTHIA treated with BiPAP      No significant past surgical history          ICU Vital Signs Last 24 Hrs  T(C): 36.4 (03 Apr 2025 16:08), Max: 37.9 (02 Apr 2025 17:00)  T(F): 97.5 (03 Apr 2025 16:08), Max: 100.2 (02 Apr 2025 17:00)  HR: 93 (03 Apr 2025 16:08) (63 - 94)  BP: 121/71 (03 Apr 2025 07:00) (102/51 - 140/87)  BP(mean): 82 (03 Apr 2025 07:00) (64 - 102)  ABP: --  ABP(mean): --  RR: 18 (03 Apr 2025 16:08) (12 - 23)  SpO2: 98% (03 Apr 2025 16:08) (92% - 99%)    O2 Parameters below as of 03 Apr 2025 07:00  Patient On (Oxygen Delivery Method): nasal cannula  O2 Flow (L/min): 2          ABG - ( 02 Apr 2025 03:20 )  pH, Arterial: 7.42  pH, Blood: x     /  pCO2: 69    /  pO2: 129   / HCO3: 45    / Base Excess: 16.6  /  SaO2: 100                   04-02 @ 07:01  -  04-03 @ 07:00  --------------------------------------------------------  IN: 695.6 mL / OUT: 3200 mL / NET: -2504.4 mL            PHYSICAL EXAM:    GENERAL: Alert, awake, responding to commands/questions,   HEAD:  Atraumatic, Normocephalic  EYES: PERRLA  ENMT: No tonsillar erythema, exudates, or enlargement; Continued secretions  NECK: Supple, No JVD, Normal thyroid  HEART: Regular rate and rhythm; No murmurs, rubs, or gallops  RESPIRATORY: Lung sounds difficult to appreciate due to body habitus, decreased breath sounds b/l, some crackles  ABDOMEN: Soft, Nontender, Nondistended; Bowel sounds present  NEUROLOGY: AAOx2-3  EXTREMITIES:  2+ Peripheral Pulses, No clubbing, cyanosis, or edema  SKIN: warm, dry, normal color, no rash or abnormal lesions    LABS:  CBC Full  -  ( 03 Apr 2025 03:45 )  WBC Count : 8.78 K/uL  RBC Count : 4.14 M/uL  Hemoglobin : 11.3 g/dL  Hematocrit : 36.8 %  Platelet Count - Automated : 156 K/uL  Mean Cell Volume : 88.9 fl  Mean Cell Hemoglobin : 27.3 pg  Mean Cell Hemoglobin Concentration : 30.7 g/dL  Auto Neutrophil # : x  Auto Lymphocyte # : x  Auto Monocyte # : x  Auto Eosinophil # : x  Auto Basophil # : x  Auto Neutrophil % : x  Auto Lymphocyte % : x  Auto Monocyte % : x  Auto Eosinophil % : x  Auto Basophil % : x    04-03    135  |  99  |  26[H]  ----------------------------<  230[H]  3.7   |  30  |  0.73    Ca    8.2[L]      03 Apr 2025 03:45  Phos  2.3     04-03  Mg     2.4     04-03    TPro  6.6  /  Alb  2.5[L]  /  TBili  0.5  /  DBili  x   /  AST  18  /  ALT  21  /  AlkPhos  63  04-03      Urinalysis Basic - ( 03 Apr 2025 03:45 )    Color: x / Appearance: x / SG: x / pH: x  Gluc: 230 mg/dL / Ketone: x  / Bili: x / Urobili: x   Blood: x / Protein: x / Nitrite: x   Leuk Esterase: x / RBC: x / WBC x   Sq Epi: x / Non Sq Epi: x / Bacteria: x          RADIOLOGY & ADDITIONAL STUDIES REVIEWED:  ***    [ ]GOALS OF CARE DISCUSSION WITH PATIENT/FAMILY/PROXY:    CRITICAL CARE TIME SPENT: 35 minutes

## 2025-04-03 NOTE — PROGRESS NOTE ADULT - ASSESSMENT
Pt is a 64M from [facility], w/ PMHx of CAD, CHF, DM, CYNTHIA on nocturnal bipap, COPD (multiple intubations in past on 3L of home O2), peripheral neuropathy, GERD, HTN, HLD, obesity, polyarthritis, Pulmonary HTN, TIA, BPH, depression/anxiety p/w respiratory distress from NH. Placed on BiPAP, s/p lasix 80, solumedrol 125 . Admitted for AHRF 2/2 COPD vs. CHF exacerbation.     #AHRF 2/2 COPD exacerbation vs. CHF exacerbation  #COPD  #HFpEF   #CAD  #HTN  #HLD  #CYNTHIA on nocturnal bipap  #Pulm HTN  #BPH  #Depression/Anxiety   #GERD    _________CNS___________  #Mentation  - s/p extubation 4/2  - Baseline mental status AAOx23    #anxiety/depression  - Resumed Trazadone, risperidone, alprazolam PRN    _________CVS___________  #HFpEF   - not clinically overloaded in the ED, POCUS a-line predominant, grossly preserved systolic fxn   - repeat POCUS - some b-lines noted  - TTE from 10/2023- EF 50-55%, G1DD    - proBNP 11K  - Trop 145 > 101  - EKG: sinus tachycardia, RBBB (compared to prior)  - CXR shows no consolidation, congestion, effusions pending official read   - s/p lasix 80 x1  - s/p acetazolamide 500 mg x 1, 20 lasix  - repeat TTE: technically difficult read  - monitor I&O  - c/w lasix 40 mg PO QD    #CAD  - c/w asa, statin    #HTN  - not on anti-hypertensives  - continue to monitor    #HLD   - c/w statin     _________ RESP__________  #AHRF 2/2 COPD exacerbation vs. CHF exacerbation  #COPD  #CYNTHIA  - s/p lasix 80 and solumedrol 125 in ED  - taper solumedrol 40q12 starting tomorrow  - discontinue duoneb q6 RTC  - c/w spiriva + advair    #CAP Coverage, Aspiration PNA  - Episodic fevers, resolved  - Increased secretions prior to intubation, possible aspiration event  - Ceftriaxone 2 g qd  - Azithromycin 500 qd 3 days    __________GI____________  #Nutrition  - DASH, Diabetic Diet    #GERD  - PPI     ________ RENAL__________  #RADHIKA  - baseline .66 in 2024, now 1.4  - likely pre-renal in nature vs. post-renal obstruction/retention related   - mark placed in ED 3/29  - montior Cr post diuresis, strict I&O    #BPH  - mark in place  - resumde Flomax and finasteride    #Metabolic Alkalosis  - ABG on 4/2 showing metabolic alkalosis  - s/p Acetazolamide 500 mg x1, Lasix 20 mg x1     #Respiratory Acidosis   - ABG 7.21/89/53/36 > 7.23/83/66/35 > >> 7.39|65|90|39  - in the setting of AHRF, COPD exacerbation  - RESOLVED    _________MSK___________  no active issues     __________ID____________  #Aspiration PNA, CAP Coverage  - pt met SIRS criteria with tachycardia and WC 15 in ED  - episodic fevers, TMax 101, increased WBC 28  - lactate 1   - UA neg  - Sputum cx: baldomero consistent with normal ET Tube baldomero  - BCx: NGTD    - c/w ctx 2 g qd  - Azithromycin x 3 days    _________ENDO__________  #T2IDDM  - on novlog mix 70/30 bid at home, 58u in the AM, 46u in the evening + novolin R sliding scale   - s/p NPH 4/1  - last a1c 9.4, 1/2024, most recent A1c 11.3   - c/w novolog 70/30 45 U breakfast, 35 U bedtime, 3 U premeal insulin, low dose SS    _______HEME/ONC_______  no active issues     _________SKIN____________  no active issues   Peripheral IVs     ________Prophylaxis_______  #DVT- heparin subq     __________GOC/ DISPO___________  FULL CODE, ICU      Pt is a 64M from [facility], w/ PMHx of CAD, CHF, DM, CYNTHIA on nocturnal bipap, COPD (multiple intubations in past on 3L of home O2), peripheral neuropathy, GERD, HTN, HLD, obesity, polyarthritis, Pulmonary HTN, TIA, BPH, depression/anxiety p/w respiratory distress from NH. Placed on BiPAP, s/p lasix 80, solumedrol 125 . Admitted for AHRF 2/2 COPD vs. CHF exacerbation.     #AHRF 2/2 COPD exacerbation vs. CHF exacerbation  #COPD  #HFpEF   #CAD  #HTN  #HLD  #CYNTHIA on nocturnal bipap  #Pulm HTN  #BPH  #Depression/Anxiety   #GERD    _________CNS___________  #Mentation  - s/p extubation 4/2  - Baseline mental status AAOx23    #anxiety/depression  - Resumed Trazadone, risperidone, alprazolam PRN    _________CVS___________  #HFpEF   - not clinically overloaded in the ED, POCUS a-line predominant, grossly preserved systolic fxn   - repeat POCUS - some b-lines noted  - TTE from 10/2023- EF 50-55%, G1DD    - proBNP 11K  - Trop 145 > 101  - EKG: sinus tachycardia, RBBB (compared to prior)  - CXR shows no consolidation, congestion, effusions pending official read   - s/p lasix 80 x1  - s/p acetazolamide 500 mg x 1, 20 lasix  - repeat TTE: technically difficult read  - monitor I&O  - c/w lasix 40 mg PO QD    #CAD  - c/w asa, statin    #HTN  - not on anti-hypertensives  - continue to monitor    #HLD   - c/w statin     _________ RESP__________  #AHRF 2/2 COPD exacerbation vs. CHF exacerbation  #COPD  #CYNTHIA  - s/p lasix 80 and solumedrol 125 in ED  - taper solumedrol 40q12 starting tomorrow  - discontinue duoneb q6 RTC  - c/w spiriva + advair    #CAP Coverage, Aspiration PNA  - Episodic fevers, resolved  - Increased secretions prior to intubation, possible aspiration event  - Ceftriaxone 2 g qd  - Azithromycin 500 qd 3 days    __________GI____________  #Nutrition  - DASH, Diabetic Diet    #GERD  - No acute issues    ________ RENAL__________  #RADHIKA  - baseline .66 in 2024, now 1.4  - likely pre-renal in nature vs. post-renal obstruction/retention related   - mark placed in ED 3/29  - montior Cr post diuresis, strict I&O    #BPH  - mark in place  - resumde Flomax and finasteride    #Metabolic Alkalosis  - ABG on 4/2 showing metabolic alkalosis  - s/p Acetazolamide 500 mg x1, Lasix 20 mg x1     #Respiratory Acidosis   - ABG 7.21/89/53/36 > 7.23/83/66/35 > >> 7.39|65|90|39  - in the setting of AHRF, COPD exacerbation  - RESOLVED    _________MSK___________  no active issues     __________ID____________  #Aspiration PNA, CAP Coverage  - pt met SIRS criteria with tachycardia and WC 15 in ED  - episodic fevers, TMax 101, increased WBC 28  - lactate 1   - UA neg  - Sputum cx: baldomero consistent with normal ET Tube baldomero  - BCx: NGTD    - c/w ctx 2 g qd  - Azithromycin x 3 days    _________ENDO__________  #T2IDDM  - on novlog mix 70/30 bid at home, 58u in the AM, 46u in the evening + novolin R sliding scale   - s/p NPH 4/1  - last a1c 9.4, 1/2024, most recent A1c 11.3   - c/w novolog 70/30 45 U breakfast, 35 U bedtime, 3 U premeal insulin, low dose SS    _______HEME/ONC_______  no active issues     _________SKIN____________  no active issues   Peripheral IVs     ________Prophylaxis_______  #DVT- heparin subq     __________GOC/ DISPO___________  FULL CODE, ICU

## 2025-04-03 NOTE — PROGRESS NOTE ADULT - ATTENDING COMMENTS
64M from [facility], w/ PMHx of CAD, CHF, DM, CYNTHIA on nocturnal bipap, COPD (multiple intubations in past on 3L of home O2), peripheral neuropathy, GERD, HTN, HLD, obesity, polyarthritis, Pulmonary HTN, TIA, BPH, depression/anxiety  p/w respiratory distress from NH. Placed on bipap called to evaluate for continuous bipap. On exam patient mentating well, lungs CTA, no significant peripheral edema. No significant b-lines on POCUS. Labs significant for hypercapnia, elevated BNP, mild elevation in troponin. s/p solumedrol, lasix in ED.    Assessment:  - Acute on chronic hypercapnic respiratory failure  - COPD, unclear if symptoms of acute exacerbation  - HFpEF   - RADHIKA  - UTI  - Pulm HTN  - CYNTHIA  - DM  - CAD  - HTN    Plan:  - admit to ICU  - continue bipap, wean as tolerated  - monitor ABG and adjust as settings as needed  - solumedrol 40mg q12h  - duonebs  - f/u TTE  - s/p lasix in ED, not clearly overloaded to suggest decompensated CHF  - trend troponin, EKG  - ceftriaxone for UTI  - strict I&O  - urine lytes  - glucose monitoring  - insulin long acting and ISS  - DVT ppx  - stress ulcer prophylaxis  - full code.
Pt is a 64M from [facility], w/ PMHx of CAD, CHF, DM, CYNTHIA on nocturnal bipap, COPD (multiple intubations in past on 3L of home O2), peripheral neuropathy, GERD, HTN, HLD, obesity, polyarthritis, Pulmonary HTN, TIA, BPH, depression/anxiety p/w respiratory distress from NH. Placed on BiPAP, s/p lasix 80, solumedrol 125 . Admitted for AHRF 2/2 COPD vs. CHF exacerbation.     #AHRF 2/2 COPD exacerbation vs. CHF exacerbation  #COPD  #HFpEF   #CAD  #HTN  #HLD  #CYNTHIA on nocturnal bipap  #Pulm HTN  #BPH  #Depression/Anxiety   #GERD    _________CNS___________  Off sedation   Extubated     #anxiety/depression  - resume Trazadone, risperidone, alprazolam once off NPO      _________CVS___________  #HFpEF   - not clinically overloaded in the ED, POCUS a-line predominant, grossly preserved systolic fxn   - TTE from 10/2023- EF 50-55%, G1DD    - proBNP 11K  - Trop 145 > 101  - EKG: sinus tachycardia, RBBB (compared to prior)  - CXR shows no consolidation, congestion, effusions pending official read   - s/p lasix 80 x1  - TTE: technically difficult read  - monitor I&O, for now will hold lasix fow now, if additional diuresis needed given that pt does not appear clinically overloaded     #CAD  - c/w asa, statin once off NPO     #HTN  - not on anti-hypertensives  - continue to monitor    #HLD   - c/w statin once off NPO     _________ RESP__________  #AHRF 2/2 COPD exacerbation vs. CHF exacerbation  #COPD  #CYNTHIA  - s/p lasix 80 and solumedrol 125   - c/w solumedrol 40q12  - duoneb x3 doses stat, then q6 RTC  - start advair   - AB.39|65|90|39    #CAP Coverage  - Episodic fevers  - Increased secretions yesterday prior to intubation, possible aspiration event  - Increase ceftriaxone 2 g qd  - Start Azithromycin 500 qd 3 dayas    __________GI____________  #Nutrition  - Intubated, NG tube placed  - Initiate trickle tube feeds    #GERD  - PPI     ________ RENAL__________  #RADHIKA  - baseline .66 in , now 1.4  - likely pre-renal in nature vs. post-renal obstruction/retention related   - mark placed in ED 3/29  - montior Cr post diuresis, strict I&O    #BPH  - mark in place  - resume Flomax and finasteride once off NPO     #Respiratory Acidosis   - ABG 7.21/89/53/36 > 7.23/83/66/35 > >> 7.39|65|90|39  - in the setting of AHRF,COPD exacerbation  - Consider acetazolamide if bicarb inc .
Pt is a 64M from [facility], w/ PMHx of CAD, CHF, DM, CYNTHIA on nocturnal bipap, COPD (multiple intubations in past on 3L of home O2), peripheral neuropathy, GERD, HTN, HLD, obesity, polyarthritis, Pulmonary HTN, TIA, BPH, depression/anxiety p/w respiratory distress from NH. Placed on BiPAP, s/p lasix 80, solumedrol 125 . Admitted for AHRF 2/2 COPD vs. CHF exacerbation.     #AHRF 2/2 COPD exacerbation vs. CHF exacerbation  #COPD  #HFpEF   #CAD  #HTN  #HLD  #CYNTHIA on nocturnal bipap  #Pulm HTN  #BPH  #Depression/Anxiety   #GERD    _________CNS___________  #intubated, sedated  - copious secretions  - sputum cx collected and sent  - fentanyl,  propofol  - start precedex due to limited sedation with propofol, urine green    #anxiety/depression  - resume Trazadone, risperidone, alprazolam once off NPO      _________CVS___________  #HFpEF   - not clinically overloaded in the ED, POCUS a-line predominant, grossly preserved systolic fxn   - TTE from 10/2023- EF 50-55%, G1DD    - proBNP 11K  - Trop 145 > 101  - EKG: sinus tachycardia, RBBB (compared to prior)  - CXR shows no consolidation, congestion, effusions pending official read   - s/p lasix 80 x1  - TTE: technically difficult read  - monitor I&O, for now will hold lasix fow now, if additional diuresis needed given that pt does not appear clinically overloaded     #CAD  - c/w asa, statin once off NPO     #HTN  - not on anti-hypertensives  - continue to monitor    #HLD   - c/w statin once off NPO     _________ RESP__________  #AHRF 2/2 COPD exacerbation vs. CHF exacerbation  #COPD  #CYNTHIA  - s/p lasix 80 and solumedrol 125   - c/w solumedrol 40q12  - duoneb x3 doses stat, then q6 RTC  - start advair   - AB.39|65|90|39    #CAP Coverage  - Episodic fevers  - Increased secretions yesterday prior to intubation, possible aspiration event  - Increase ceftriaxone 2 g qd  - Start Azithromycin 500 qd 3 dayas    __________GI____________  #Nutrition  - Intubated, NG tube placed  - Initiate trickle tube feeds    #GERD  - PPI     ________ RENAL__________  #RADHIKA  - baseline .66 in , now 1.4  - likely pre-renal in nature vs. post-renal obstruction/retention related   - mark placed in ED 3/29  - montior Cr post diuresis, strict I&O    #BPH  - mark in place  - resume Flomax and finasteride once off NPO     #Respiratory Acidosis   - ABG 7.21/89/53/36 > 7.23/83/66/35 > >> 7.39|65|90|39  - in the setting of AHRF,COPD exacerbation  - Consider acetazolamide if bicarb inc
Pt is a 64M from [facility], w/ PMHx of CAD, CHF, DM, CYNTHIA on nocturnal bipap, COPD (multiple intubations in past on 3L of home O2), peripheral neuropathy, GERD, HTN, HLD, obesity, polyarthritis, Pulmonary HTN, TIA, BPH, depression/anxiety p/w respiratory distress from NH. Placed on BiPAP, s/p lasix 80, solumedrol 125 . Admitted for AHRF 2/2 COPD vs. CHF exacerbation.     #AHRF 2/2 COPD exacerbation vs. CHF exacerbation  #COPD  #HFpEF   #CAD  #HTN  #HLD  #CYNTHIA on nocturnal bipap  #Pulm HTN  #BPH  #Depression/Anxiety   #GERD    _________CNS___________  #sedation held   - Propofol d/c for SAT SBT and hypertrig  - start precedex due to limited sedation with propofol, urine green    #anxiety/depression  - resume Trazadone, risperidone, alprazolam once off NPO      _________CVS___________  #HFpEF   - not clinically overloaded in the ED, POCUS a-line predominant, grossly preserved systolic fxn   - TTE from 10/2023- EF 50-55%, G1DD    - proBNP 11K  - Trop 145 > 101  - EKG: sinus tachycardia, RBBB (compared to prior)  - CXR shows no consolidation, congestion, effusions pending official read   - s/p lasix 80 x1  - TTE: technically difficult read  - monitor I&O, for now will hold lasix fow now, if additional diuresis needed given that pt does not appear clinically overloaded     #CAD  - c/w asa, statin once off NPO     #HTN  - not on anti-hypertensives  - continue to monitor    #HLD   - c/w statin once off NPO     _________ RESP__________  #AHRF 2/2 COPD exacerbation vs. CHF exacerbation  #COPD  #CYNTHIA  - s/p lasix 80 and solumedrol 125   - c/w solumedrol 40q12  - duoneb x3 doses stat, then q6 RTC  - start advair   - AB.39|65|90|39  - Tolerated SAT SBT and extubated to O2 via NC     #CAP Coverage  - Increase ceftriaxone 2 g qd  - Start Azithromycin 500 qd 3 dayas    __________GI____________  #Nutrition  - Intubated, NG tube placed  - Initiate trickle tube feeds    #GERD  - PPI     ________ RENAL__________  #RADHIKA  - baseline .66 in , now 1.4  - likely pre-renal in nature vs. post-renal obstruction/retention related   - mark placed in ED 3/29  - montior Cr post diuresis, strict I&O    #BPH  - mark in place  - resume Flomax and finasteride once off NPO     #Respiratory Acidosis   - ABG 7.21/89/53/36 > 7.23/83/66/35 > >> 7.39|65|90|39  - in the setting of AHRF,COPD exacerbation  - Consider acetazolamide if bicarb inc .
Pt is a 64M from [facility], w/ PMHx of CAD, CHF, DM, CYNTHIA on nocturnal bipap, COPD (multiple intubations in past on 3L of home O2), peripheral neuropathy, GERD, HTN, HLD, obesity, polyarthritis, Pulmonary HTN, TIA, BPH, depression/anxiety p/w respiratory distress from NH. Placed on BiPAP, s/p lasix 80, solumedrol 125 . Admitted for AHRF 2/2 COPD vs. CHF exacerbation.     #AHRF 2/2 COPD exacerbation vs. CHF exacerbation  #COPD  #HFpEF   #CAD  #HTN  #HLD  #CYNTHIA on nocturnal bipap  #Pulm HTN  #BPH  #Depression/Anxiety   #GERD    _________CNS___________  #intubated, sedated  - copious secretions  - sputum cx collected and sent  - fentanyl 1.5,  propofol    #anxiety/depression  - resume Trazadone, risperidone, alprazolam once off NPO      _________CVS___________  #HFpEF   - not clinically overloaded in the ED, POCUS a-line predominant, grossly preserved systolic fxn   - TTE from 10/2023- EF 50-55%, G1DD    - proBNP 11K  - Trop 145 > 101  - EKG: sinus tachycardia, RBBB (compared to prior)  - CXR shows no consolidation, congestion, effusions pending official read   - s/p lasix 80 x1  - TTE: technically difficult read  - monitor I&O, for now will hold lasix fow now, if additional diuresis needed given that pt does not appear clinically overloaded     #CAD  - c/w asa, statin once off NPO     #HTN  - not on anti-hypertensives  - continue to monitor    #HLD   - c/w statin once off NPO     _________ RESP__________  #AHRF 2/2 COPD exacerbation vs. CHF exacerbation  #COPD  #CYNTHIA  - s/p lasix 80 and solumedrol 125   - c/w solumedrol 40q12  - duoneb x3 doses stat, then q6 RTC  - start advair   - AB.39|65|90|39    #Possible PNA  - Episodic fevers  - Increased secretions yesterday prior to intubation, possible aspiration event  - Increase ceftriaxone 2 g qd    ________ RENAL__________  #RADHIKA  - baseline .66 in , now 1.4  - likely pre-renal in nature vs. post-renal obstruction/retention related   - mark placed in ED 3/29  - montior Cr post diuresis, strict I&O    #BPH  - mark in place  - resume Flomax and finasteride once off NPO     #Respiratory Acidosis   - ABG 7.21/89/53/36 > 7.23/83/66/35 > >> 7.39|65|90|39  - in the setting of AHRF,COPD exacerbation    __________ID____________  #PNA  - pt met SIRS criteria with tachycardia and WC 15 in ED  - episodic fevers, TMax 101, increased WBC 28  - lactate 1   - UA neg    - c/w ctx 2 g qd  - f/u bcx     _________ENDO__________  #T2IDDM  - on novlog mix 70/30 bid at home, 58u in the AM, 46u in the evening + novolin R sliding scale   - Lantus 45 u at bedtime (originally decreased dose to 30 given NPO status, mod ISSq6 while NPO   - last a1c 9.4, 2024, most recent A1c 11.3

## 2025-04-04 DIAGNOSIS — N39.0 URINARY TRACT INFECTION, SITE NOT SPECIFIED: ICD-10-CM

## 2025-04-04 DIAGNOSIS — Z71.89 OTHER SPECIFIED COUNSELING: ICD-10-CM

## 2025-04-04 DIAGNOSIS — I50.9 HEART FAILURE, UNSPECIFIED: ICD-10-CM

## 2025-04-04 DIAGNOSIS — E78.5 HYPERLIPIDEMIA, UNSPECIFIED: ICD-10-CM

## 2025-04-04 DIAGNOSIS — I27.20 PULMONARY HYPERTENSION, UNSPECIFIED: ICD-10-CM

## 2025-04-04 DIAGNOSIS — J44.9 CHRONIC OBSTRUCTIVE PULMONARY DISEASE, UNSPECIFIED: ICD-10-CM

## 2025-04-04 DIAGNOSIS — F39 UNSPECIFIED MOOD [AFFECTIVE] DISORDER: ICD-10-CM

## 2025-04-04 DIAGNOSIS — J96.01 ACUTE RESPIRATORY FAILURE WITH HYPOXIA: ICD-10-CM

## 2025-04-04 LAB
ANION GAP SERPL CALC-SCNC: 9 MMOL/L — SIGNIFICANT CHANGE UP (ref 5–17)
BASOPHILS # BLD AUTO: 0.04 K/UL — SIGNIFICANT CHANGE UP (ref 0–0.2)
BASOPHILS NFR BLD AUTO: 0.4 % — SIGNIFICANT CHANGE UP (ref 0–2)
BUN SERPL-MCNC: 16 MG/DL — SIGNIFICANT CHANGE UP (ref 7–18)
CALCIUM SERPL-MCNC: 8.2 MG/DL — LOW (ref 8.4–10.5)
CHLORIDE SERPL-SCNC: 97 MMOL/L — SIGNIFICANT CHANGE UP (ref 96–108)
CO2 SERPL-SCNC: 27 MMOL/L — SIGNIFICANT CHANGE UP (ref 22–31)
CREAT SERPL-MCNC: 0.62 MG/DL — SIGNIFICANT CHANGE UP (ref 0.5–1.3)
EGFR: 108 ML/MIN/1.73M2 — SIGNIFICANT CHANGE UP
EGFR: 108 ML/MIN/1.73M2 — SIGNIFICANT CHANGE UP
EOSINOPHIL # BLD AUTO: 0.29 K/UL — SIGNIFICANT CHANGE UP (ref 0–0.5)
EOSINOPHIL NFR BLD AUTO: 2.7 % — SIGNIFICANT CHANGE UP (ref 0–6)
GLUCOSE BLDC GLUCOMTR-MCNC: 206 MG/DL — HIGH (ref 70–99)
GLUCOSE BLDC GLUCOMTR-MCNC: 209 MG/DL — HIGH (ref 70–99)
GLUCOSE BLDC GLUCOMTR-MCNC: 272 MG/DL — HIGH (ref 70–99)
GLUCOSE BLDC GLUCOMTR-MCNC: 299 MG/DL — HIGH (ref 70–99)
GLUCOSE SERPL-MCNC: 205 MG/DL — HIGH (ref 70–99)
HCT VFR BLD CALC: 38.4 % — LOW (ref 39–50)
HGB BLD-MCNC: 12.4 G/DL — LOW (ref 13–17)
IMM GRANULOCYTES NFR BLD AUTO: 1.8 % — HIGH (ref 0–0.9)
LYMPHOCYTES # BLD AUTO: 18.3 % — SIGNIFICANT CHANGE UP (ref 13–44)
LYMPHOCYTES # BLD AUTO: 2 K/UL — SIGNIFICANT CHANGE UP (ref 1–3.3)
MAGNESIUM SERPL-MCNC: 2.1 MG/DL — SIGNIFICANT CHANGE UP (ref 1.6–2.6)
MCHC RBC-ENTMCNC: 27.3 PG — SIGNIFICANT CHANGE UP (ref 27–34)
MCHC RBC-ENTMCNC: 32.3 G/DL — SIGNIFICANT CHANGE UP (ref 32–36)
MCV RBC AUTO: 84.4 FL — SIGNIFICANT CHANGE UP (ref 80–100)
MONOCYTES # BLD AUTO: 0.64 K/UL — SIGNIFICANT CHANGE UP (ref 0–0.9)
MONOCYTES NFR BLD AUTO: 5.9 % — SIGNIFICANT CHANGE UP (ref 2–14)
NEUTROPHILS # BLD AUTO: 7.73 K/UL — HIGH (ref 1.8–7.4)
NEUTROPHILS NFR BLD AUTO: 70.9 % — SIGNIFICANT CHANGE UP (ref 43–77)
NRBC BLD AUTO-RTO: 0 /100 WBCS — SIGNIFICANT CHANGE UP (ref 0–0)
PHOSPHATE SERPL-MCNC: 2.4 MG/DL — LOW (ref 2.5–4.5)
PLATELET # BLD AUTO: 156 K/UL — SIGNIFICANT CHANGE UP (ref 150–400)
POTASSIUM SERPL-MCNC: 3.5 MMOL/L — SIGNIFICANT CHANGE UP (ref 3.5–5.3)
POTASSIUM SERPL-SCNC: 3.5 MMOL/L — SIGNIFICANT CHANGE UP (ref 3.5–5.3)
RBC # BLD: 4.55 M/UL — SIGNIFICANT CHANGE UP (ref 4.2–5.8)
RBC # FLD: 13.3 % — SIGNIFICANT CHANGE UP (ref 10.3–14.5)
SODIUM SERPL-SCNC: 133 MMOL/L — LOW (ref 135–145)
WBC # BLD: 10.9 K/UL — HIGH (ref 3.8–10.5)
WBC # FLD AUTO: 10.9 K/UL — HIGH (ref 3.8–10.5)

## 2025-04-04 PROCEDURE — 99497 ADVNCD CARE PLAN 30 MIN: CPT

## 2025-04-04 RX ORDER — LIDOCAINE HYDROCHLORIDE 20 MG/ML
1 JELLY TOPICAL DAILY
Refills: 0 | Status: DISCONTINUED | OUTPATIENT
Start: 2025-04-04 | End: 2025-04-09

## 2025-04-04 RX ORDER — SOD PHOS DI, MONO/K PHOS MONO 250 MG
2 TABLET ORAL THREE TIMES A DAY
Refills: 0 | Status: COMPLETED | OUTPATIENT
Start: 2025-04-04 | End: 2025-04-05

## 2025-04-04 RX ORDER — SENNA 187 MG
2 TABLET ORAL AT BEDTIME
Refills: 0 | Status: DISCONTINUED | OUTPATIENT
Start: 2025-04-04 | End: 2025-04-09

## 2025-04-04 RX ORDER — OXYCODONE HYDROCHLORIDE 30 MG/1
2.5 TABLET ORAL EVERY 4 HOURS
Refills: 0 | Status: DISCONTINUED | OUTPATIENT
Start: 2025-04-04 | End: 2025-04-09

## 2025-04-04 RX ORDER — POLYETHYLENE GLYCOL 3350 17 G/17G
17 POWDER, FOR SOLUTION ORAL DAILY
Refills: 0 | Status: DISCONTINUED | OUTPATIENT
Start: 2025-04-04 | End: 2025-04-09

## 2025-04-04 RX ORDER — ACETAMINOPHEN 500 MG/5ML
1000 LIQUID (ML) ORAL EVERY 8 HOURS
Refills: 0 | Status: COMPLETED | OUTPATIENT
Start: 2025-04-04 | End: 2025-04-07

## 2025-04-04 RX ORDER — FLUCONAZOLE 150 MG
200 TABLET ORAL DAILY
Refills: 0 | Status: DISCONTINUED | OUTPATIENT
Start: 2025-04-04 | End: 2025-04-09

## 2025-04-04 RX ORDER — OXYCODONE HYDROCHLORIDE 30 MG/1
5 TABLET ORAL EVERY 4 HOURS
Refills: 0 | Status: DISCONTINUED | OUTPATIENT
Start: 2025-04-04 | End: 2025-04-09

## 2025-04-04 RX ADMIN — Medication 58 UNIT(S): at 08:03

## 2025-04-04 RX ADMIN — Medication 200 MILLIGRAM(S): at 12:31

## 2025-04-04 RX ADMIN — Medication 250 MILLIGRAM(S): at 12:30

## 2025-04-04 RX ADMIN — Medication 2 TABLET(S): at 12:32

## 2025-04-04 RX ADMIN — INSULIN LISPRO 3: 100 INJECTION, SOLUTION INTRAVENOUS; SUBCUTANEOUS at 12:27

## 2025-04-04 RX ADMIN — Medication 1 TABLET(S): at 06:12

## 2025-04-04 RX ADMIN — Medication 1000 MILLIGRAM(S): at 16:01

## 2025-04-04 RX ADMIN — LIDOCAINE HYDROCHLORIDE 1 PATCH: 20 JELLY TOPICAL at 19:29

## 2025-04-04 RX ADMIN — Medication 1000 MILLIGRAM(S): at 10:56

## 2025-04-04 RX ADMIN — NICOTINE POLACRILEX 1 PATCH: 4 GUM, CHEWING ORAL at 07:30

## 2025-04-04 RX ADMIN — Medication 1 DROP(S): at 18:09

## 2025-04-04 RX ADMIN — LATANOPROST PF 1 DROP(S): 0.05 SOLUTION/ DROPS OPHTHALMIC at 22:00

## 2025-04-04 RX ADMIN — Medication 2 TABLET(S): at 22:00

## 2025-04-04 RX ADMIN — Medication 0.25 MILLIGRAM(S): at 22:00

## 2025-04-04 RX ADMIN — Medication 1 DROP(S): at 06:12

## 2025-04-04 RX ADMIN — Medication 1000 MILLIGRAM(S): at 15:14

## 2025-04-04 RX ADMIN — Medication 1 DOSE(S): at 12:34

## 2025-04-04 RX ADMIN — INSULIN LISPRO 3: 100 INJECTION, SOLUTION INTRAVENOUS; SUBCUTANEOUS at 17:01

## 2025-04-04 RX ADMIN — Medication 1000 MILLIGRAM(S): at 11:15

## 2025-04-04 RX ADMIN — Medication 1000 MILLIGRAM(S): at 22:29

## 2025-04-04 RX ADMIN — FINASTERIDE 5 MILLIGRAM(S): 1 TABLET, FILM COATED ORAL at 12:33

## 2025-04-04 RX ADMIN — INSULIN LISPRO 2: 100 INJECTION, SOLUTION INTRAVENOUS; SUBCUTANEOUS at 07:59

## 2025-04-04 RX ADMIN — Medication 1 DOSE(S): at 22:16

## 2025-04-04 RX ADMIN — LIDOCAINE HYDROCHLORIDE 1 PATCH: 20 JELLY TOPICAL at 23:58

## 2025-04-04 RX ADMIN — Medication 81 MILLIGRAM(S): at 12:31

## 2025-04-04 RX ADMIN — HEPARIN SODIUM 5000 UNIT(S): 1000 INJECTION INTRAVENOUS; SUBCUTANEOUS at 22:16

## 2025-04-04 RX ADMIN — Medication 2 TABLET(S): at 15:15

## 2025-04-04 RX ADMIN — ATORVASTATIN CALCIUM 20 MILLIGRAM(S): 80 TABLET, FILM COATED ORAL at 21:58

## 2025-04-04 RX ADMIN — Medication 1000 MILLIGRAM(S): at 21:58

## 2025-04-04 RX ADMIN — Medication 2 TABLET(S): at 21:59

## 2025-04-04 RX ADMIN — LIDOCAINE HYDROCHLORIDE 1 PATCH: 20 JELLY TOPICAL at 12:37

## 2025-04-04 RX ADMIN — METHYLPREDNISOLONE ACETATE 40 MILLIGRAM(S): 80 INJECTION, SUSPENSION INTRA-ARTICULAR; INTRALESIONAL; INTRAMUSCULAR; SOFT TISSUE at 06:13

## 2025-04-04 RX ADMIN — Medication 1 TABLET(S): at 17:04

## 2025-04-04 RX ADMIN — HEPARIN SODIUM 5000 UNIT(S): 1000 INJECTION INTRAVENOUS; SUBCUTANEOUS at 06:14

## 2025-04-04 RX ADMIN — Medication 150 MILLIGRAM(S): at 12:35

## 2025-04-04 RX ADMIN — FUROSEMIDE 40 MILLIGRAM(S): 10 INJECTION INTRAMUSCULAR; INTRAVENOUS at 06:12

## 2025-04-04 RX ADMIN — TIOTROPIUM BROMIDE INHALATION SPRAY 2 PUFF(S): 3.12 SPRAY, METERED RESPIRATORY (INHALATION) at 13:19

## 2025-04-04 RX ADMIN — CEFTRIAXONE 100 MILLIGRAM(S): 500 INJECTION, POWDER, FOR SOLUTION INTRAMUSCULAR; INTRAVENOUS at 08:02

## 2025-04-04 RX ADMIN — TAMSULOSIN HYDROCHLORIDE 0.4 MILLIGRAM(S): 0.4 CAPSULE ORAL at 22:01

## 2025-04-04 RX ADMIN — HEPARIN SODIUM 5000 UNIT(S): 1000 INJECTION INTRAVENOUS; SUBCUTANEOUS at 15:15

## 2025-04-04 RX ADMIN — Medication 46 UNIT(S): at 22:02

## 2025-04-04 RX ADMIN — POLYETHYLENE GLYCOL 3350 17 GRAM(S): 17 POWDER, FOR SOLUTION ORAL at 12:37

## 2025-04-04 NOTE — PROGRESS NOTE ADULT - ASSESSMENT
64M from [facility], w/ PMHx of CAD, CHF, DM, CYNTHIA on nocturnal bipap, COPD (multiple intubations in past on 3L of home O2), peripheral neuropathy, GERD, HTN, HLD, obesity, polyarthritis, Pulmonary HTN, TIA, BPH, depression/anxiety p/w respiratory distress from NH. Placed on BiPAP, s/p lasix 80, solumedrol 125. Admitted to ICU for AHHRF 2/2 COPD, possible CHF exacerbation, aspiration pneumonia. Intubated for increased WOB and fever. TTE resulted poor read , +ve moderate pHTN. Treated with IV lasix, diamox, steroids. Successfully extubated to 2 L NC on 4/2. Patient started on his home nocturnal BiPAP. Patient declined BiPAP. Continued on home medications trazodone, alprazolam, risperidone, atorvastatin, finasteride. Patient downgraded to AI. Blood culture negative, sputum culture negative.  4/4: Patient complain of lower back pain, started acetaminophen 1G q8h x3days, lidocaine daily PRN oxycodone. Patient pending Speech and swallow evaluation for aspiration. 64M from [facility], w/ PMHx of CAD, CHF, DM, CYNTHIA on nocturnal bipap, COPD (multiple intubations in past on 3L of home O2), peripheral neuropathy, GERD, HTN, HLD, obesity, polyarthritis, Pulmonary HTN, TIA, BPH, depression/anxiety p/w respiratory distress from NH. Placed on BiPAP, s/p lasix 80, solumedrol 125. Admitted to ICU for AHHRF 2/2 COPD, possible CHF exacerbation, aspiration pneumonia. Intubated for increased WOB and fever. TTE resulted poor read , +ve moderate pHTN. Treated with IV lasix, diamox, steroids. Successfully extubated to 2 L NC on 4/2. Patient started on his home nocturnal BiPAP. Patient declined BiPAP. Continued on home medications trazodone, alprazolam, risperidone, atorvastatin, finasteride. Patient downgraded to AI. Blood culture negative, sputum culture negative.  4/4: Patient complain of lower back pain, started acetaminophen 1G q8h x3days, lidocaine daily PRN oxycodone. Patient pending Speech and swallow evaluation for aspiration. Found to have Candidiasis UTI treating with Fluconazole 14 days.

## 2025-04-04 NOTE — PROGRESS NOTE ADULT - SUBJECTIVE AND OBJECTIVE BOX
DEVIKA CARBALLO    SCU progress note    INTERVAL HPI/OVERNIGHT EVENTS: ***    DNR [ ]   DNI  [  ]    Covid - 19 PCR:     The 4Ms    What Matters Most: see GOC  Age appropriate Medications/Screen for High Risk Medication: Yes  Mentation: see CAM below  Mobility: defer to physical exam    The Confusion Assessment Method (CAM) Diagnostic Algorithm     1: Acute Onset or Fluctuating Course  - Is there evidence of an acute change in mental status from the patient’s baseline? Did the (abnormal) behavior  fluctuate during the day, that is, tend to come and go, or increase and decrease in severity?  [ ] YES [ ] NO     2: Inattention  - Did the patient have difficulty focusing attention, being easily distractible, or having difficulty keeping track of what was being said?  [ ] YES [ ] NO     3: Disorganized thinking  -Was the patient’s thinking disorganized or incoherent, such as rambling or irrelevant conversation, unclear or illogical flow of ideas, or unpredictable switching from subject to subject?  [ ] YES [ ] NO    4: Altered Level of consciousness?  [ ] YES [ ] NO    The diagnosis of delirium by CAM requires the presence of features 1 and 2 and either 3 or 4.    PRESSORS: [ ] YES [ ] NO  azithromycin  IVPB      azithromycin  IVPB 500 milliGRAM(s) IV Intermittent every 24 hours  cefTRIAXone   IVPB 2000 milliGRAM(s) IV Intermittent every 24 hours    Cardiovascular:  Heart Failure  Acute   Acute on Chronic  Chronic       furosemide    Tablet 40 milliGRAM(s) Oral daily    Pulmonary:  albuterol/ipratropium for Nebulization 3 milliLiter(s) Nebulizer every 6 hours PRN  fluticasone propionate/ salmeterol 250-50 MICROgram(s) Diskus 1 Dose(s) Inhalation two times a day  tiotropium 2.5 MICROgram(s) Inhaler 2 Puff(s) Inhalation daily    Hematalogic:  aspirin  chewable 81 milliGRAM(s) Oral daily  heparin   Injectable 5000 Unit(s) SubCutaneous every 8 hours    Other:  acetaminophen     Tablet .. 1000 milliGRAM(s) Oral every 8 hours  ALPRAZolam 0.5 milliGRAM(s) Oral three times a day PRN  artificial  tears Solution 1 Drop(s) Both EYES two times a day  atorvastatin 20 milliGRAM(s) Oral at bedtime  finasteride 5 milliGRAM(s) Oral daily  insulin lispro (ADMELOG) corrective regimen sliding scale   SubCutaneous three times a day before meals  insulin lispro (ADMELOG) corrective regimen sliding scale   SubCutaneous at bedtime  insulin NPH human recombinant 46 Unit(s) SubCutaneous at bedtime  insulin NPH human recombinant 58 Unit(s) SubCutaneous before breakfast  latanoprost 0.005% Ophthalmic Solution 1 Drop(s) Both EYES at bedtime  lidocaine   4% Patch 1 Patch Transdermal daily  methylPREDNISolone sodium succinate Injectable 40 milliGRAM(s) IV Push daily  nicotine -  14 mG/24Hr(s) Patch 1 Patch Transdermal daily  oxyCODONE    IR 2.5 milliGRAM(s) Oral every 4 hours PRN  oxyCODONE    IR 5 milliGRAM(s) Oral every 4 hours PRN  polyethylene glycol 3350 17 Gram(s) Oral daily  potassium phosphate / sodium phosphate Tablet (K-PHOS No. 2) 2 Tablet(s) Oral three times a day  potassium phosphate / sodium phosphate Tablet (K-PHOS No. 2) 1 Tablet(s) Oral two times a day  risperiDONE   Tablet 0.25 milliGRAM(s) Oral at bedtime  senna 2 Tablet(s) Oral at bedtime  tamsulosin 0.4 milliGRAM(s) Oral at bedtime  traZODone 150 milliGRAM(s) Oral daily    acetaminophen     Tablet .. 1000 milliGRAM(s) Oral every 8 hours  albuterol/ipratropium for Nebulization 3 milliLiter(s) Nebulizer every 6 hours PRN  ALPRAZolam 0.5 milliGRAM(s) Oral three times a day PRN  artificial  tears Solution 1 Drop(s) Both EYES two times a day  aspirin  chewable 81 milliGRAM(s) Oral daily  atorvastatin 20 milliGRAM(s) Oral at bedtime  azithromycin  IVPB      azithromycin  IVPB 500 milliGRAM(s) IV Intermittent every 24 hours  cefTRIAXone   IVPB 2000 milliGRAM(s) IV Intermittent every 24 hours  finasteride 5 milliGRAM(s) Oral daily  fluticasone propionate/ salmeterol 250-50 MICROgram(s) Diskus 1 Dose(s) Inhalation two times a day  furosemide    Tablet 40 milliGRAM(s) Oral daily  heparin   Injectable 5000 Unit(s) SubCutaneous every 8 hours  insulin lispro (ADMELOG) corrective regimen sliding scale   SubCutaneous three times a day before meals  insulin lispro (ADMELOG) corrective regimen sliding scale   SubCutaneous at bedtime  insulin NPH human recombinant 46 Unit(s) SubCutaneous at bedtime  insulin NPH human recombinant 58 Unit(s) SubCutaneous before breakfast  latanoprost 0.005% Ophthalmic Solution 1 Drop(s) Both EYES at bedtime  lidocaine   4% Patch 1 Patch Transdermal daily  methylPREDNISolone sodium succinate Injectable 40 milliGRAM(s) IV Push daily  nicotine -  14 mG/24Hr(s) Patch 1 Patch Transdermal daily  oxyCODONE    IR 2.5 milliGRAM(s) Oral every 4 hours PRN  oxyCODONE    IR 5 milliGRAM(s) Oral every 4 hours PRN  polyethylene glycol 3350 17 Gram(s) Oral daily  potassium phosphate / sodium phosphate Tablet (K-PHOS No. 2) 2 Tablet(s) Oral three times a day  potassium phosphate / sodium phosphate Tablet (K-PHOS No. 2) 1 Tablet(s) Oral two times a day  risperiDONE   Tablet 0.25 milliGRAM(s) Oral at bedtime  senna 2 Tablet(s) Oral at bedtime  tamsulosin 0.4 milliGRAM(s) Oral at bedtime  tiotropium 2.5 MICROgram(s) Inhaler 2 Puff(s) Inhalation daily  traZODone 150 milliGRAM(s) Oral daily    Drug Dosing Weight  Height (cm): 172.7 (29 Mar 2025 16:46)  Weight (kg): 96.3 (29 Mar 2025 16:46)  BMI (kg/m2): 32.3 (29 Mar 2025 16:46)  BSA (m2): 2.1 (29 Mar 2025 16:46)    CENTRAL LINE: [ ] YES [ ] NO  LOCATION:   DATE INSERTED:  REMOVE: [ ] YES [ ] NO  EXPLAIN:    MT: [ ] YES [ ] NO    DATE INSERTED:  REMOVE:  [ ] YES [ ] NO  EXPLAIN:    PAST MEDICAL & SURGICAL HISTORY:  COPD (chronic obstructive pulmonary disease)  Oxygen 2-3L at home      Stented coronary artery  2017      HLD (hyperlipidemia)      Pulmonary HTN      Type 2 diabetes mellitus without complication, with long-term current use of insulin      DM (diabetes mellitus)      CAD (coronary artery disease)      GERD (gastroesophageal reflux disease)      Insomnia      CHF (congestive heart failure)      HTN (hypertension)      Mood disorder      COPD (chronic obstructive pulmonary disease)      HTN (hypertension)      DM (diabetes mellitus)      CAD (coronary artery disease)      Bipolar disorder      CYNTHIA treated with BiPAP      No significant past surgical history                  04-03 @ 07:01  -  04-04 @ 07:00  --------------------------------------------------------  IN: 4160 mL / OUT: 2650 mL / NET: 1510 mL            PHYSICAL EXAM:    GENERAL: NAD, well-groomed, well-developed  HEAD:  Atraumatic, Normocephalic  EYES: EOMI, PERRLA, conjunctiva and sclera clear  ENMT: No tonsillar erythema, exudates, or enlargement; Moist mucous membranes, Good dentition, No lesions  NECK: Supple, No JVD, Normal thyroid  NERVOUS SYSTEM:  Alert & Oriented X3, Good concentration; Motor Strength 5/5 B/L upper and lower extremities; DTRs 2+ intact and symmetric  CHEST/LUNG: Clear to percussion bilaterally; No rales, rhonchi, wheezing, or rubs  HEART: Regular rate and rhythm; No murmurs, rubs, or gallops  ABDOMEN: Soft, Nontender, Nondistended; Bowel sounds present  EXTREMITIES:  2+ Peripheral Pulses, No clubbing, cyanosis, or edema  LYMPH: No lymphadenopathy noted  SKIN: No rashes or lesions      LABS:  CBC Full  -  ( 04 Apr 2025 06:44 )  WBC Count : 10.90 K/uL  RBC Count : 4.55 M/uL  Hemoglobin : 12.4 g/dL  Hematocrit : 38.4 %  Platelet Count - Automated : 156 K/uL  Mean Cell Volume : 84.4 fl  Mean Cell Hemoglobin : 27.3 pg  Mean Cell Hemoglobin Concentration : 32.3 g/dL  Auto Neutrophil # : x  Auto Lymphocyte # : x  Auto Monocyte # : x  Auto Eosinophil # : x  Auto Basophil # : x  Auto Neutrophil % : x  Auto Lymphocyte % : x  Auto Monocyte % : x  Auto Eosinophil % : x  Auto Basophil % : x    04-04    133[L]  |  97  |  16  ----------------------------<  205[H]  3.5   |  27  |  0.62    Ca    8.2[L]      04 Apr 2025 06:44  Phos  2.4     04-04  Mg     2.1     04-04    TPro  6.6  /  Alb  2.5[L]  /  TBili  0.5  /  DBili  x   /  AST  18  /  ALT  21  /  AlkPhos  63  04-03      Urinalysis Basic - ( 04 Apr 2025 06:44 )    Color: x / Appearance: x / SG: x / pH: x  Gluc: 205 mg/dL / Ketone: x  / Bili: x / Urobili: x   Blood: x / Protein: x / Nitrite: x   Leuk Esterase: x / RBC: x / WBC x   Sq Epi: x / Non Sq Epi: x / Bacteria: x            [  ]  DVT Prophylaxis  [  ]  Nutrition, Brand, Rate         Goal Rate        Abnormal Nutritional Status -  Malnutrition   Cachexia      Morbid Obesity BMI >/=40    RADIOLOGY & ADDITIONAL STUDIES:  ***    Goals of Care Discussion with Family/Proxy/Other   - see note from/family meeting set up for...     DEVIKA CARBALLO    SCU progress note    INTERVAL HPI/OVERNIGHT EVENTS: *** downgraded overnight from ICU    DNR [ ]   DNI  [  ] Full Code    Covid - 19 PCR:     The 4Ms    What Matters Most: see GOC  Age appropriate Medications/Screen for High Risk Medication: Yes  Mentation: see CAM below  Mobility: defer to physical exam    The Confusion Assessment Method (CAM) Diagnostic Algorithm     1: Acute Onset or Fluctuating Course  - Is there evidence of an acute change in mental status from the patient’s baseline? Did the (abnormal) behavior  fluctuate during the day, that is, tend to come and go, or increase and decrease in severity?  [ ] YES [ x] NO     2: Inattention  - Did the patient have difficulty focusing attention, being easily distractible, or having difficulty keeping track of what was being said?  [ ] YES [x ] NO     3: Disorganized thinking  -Was the patient’s thinking disorganized or incoherent, such as rambling or irrelevant conversation, unclear or illogical flow of ideas, or unpredictable switching from subject to subject?  [ ] YES [ x] NO    4: Altered Level of consciousness?  [ ] YES [x ] NO    The diagnosis of delirium by CAM requires the presence of features 1 and 2 and either 3 or 4.    PRESSORS: [ ] YES [x ] NO  azithromycin  IVPB      azithromycin  IVPB 500 milliGRAM(s) IV Intermittent every 24 hours  cefTRIAXone   IVPB 2000 milliGRAM(s) IV Intermittent every 24 hours    Cardiovascular:  Heart Failure  Acute   Acute on Chronic  Chronic       furosemide    Tablet 40 milliGRAM(s) Oral daily    Pulmonary:  albuterol/ipratropium for Nebulization 3 milliLiter(s) Nebulizer every 6 hours PRN  fluticasone propionate/ salmeterol 250-50 MICROgram(s) Diskus 1 Dose(s) Inhalation two times a day  tiotropium 2.5 MICROgram(s) Inhaler 2 Puff(s) Inhalation daily    Hematalogic:  aspirin  chewable 81 milliGRAM(s) Oral daily  heparin   Injectable 5000 Unit(s) SubCutaneous every 8 hours    Other:  acetaminophen     Tablet .. 1000 milliGRAM(s) Oral every 8 hours  ALPRAZolam 0.5 milliGRAM(s) Oral three times a day PRN  artificial  tears Solution 1 Drop(s) Both EYES two times a day  atorvastatin 20 milliGRAM(s) Oral at bedtime  finasteride 5 milliGRAM(s) Oral daily  insulin lispro (ADMELOG) corrective regimen sliding scale   SubCutaneous three times a day before meals  insulin lispro (ADMELOG) corrective regimen sliding scale   SubCutaneous at bedtime  insulin NPH human recombinant 46 Unit(s) SubCutaneous at bedtime  insulin NPH human recombinant 58 Unit(s) SubCutaneous before breakfast  latanoprost 0.005% Ophthalmic Solution 1 Drop(s) Both EYES at bedtime  lidocaine   4% Patch 1 Patch Transdermal daily  methylPREDNISolone sodium succinate Injectable 40 milliGRAM(s) IV Push daily  nicotine -  14 mG/24Hr(s) Patch 1 Patch Transdermal daily  oxyCODONE    IR 2.5 milliGRAM(s) Oral every 4 hours PRN  oxyCODONE    IR 5 milliGRAM(s) Oral every 4 hours PRN  polyethylene glycol 3350 17 Gram(s) Oral daily  potassium phosphate / sodium phosphate Tablet (K-PHOS No. 2) 2 Tablet(s) Oral three times a day  potassium phosphate / sodium phosphate Tablet (K-PHOS No. 2) 1 Tablet(s) Oral two times a day  risperiDONE   Tablet 0.25 milliGRAM(s) Oral at bedtime  senna 2 Tablet(s) Oral at bedtime  tamsulosin 0.4 milliGRAM(s) Oral at bedtime  traZODone 150 milliGRAM(s) Oral daily    acetaminophen     Tablet .. 1000 milliGRAM(s) Oral every 8 hours  albuterol/ipratropium for Nebulization 3 milliLiter(s) Nebulizer every 6 hours PRN  ALPRAZolam 0.5 milliGRAM(s) Oral three times a day PRN  artificial  tears Solution 1 Drop(s) Both EYES two times a day  aspirin  chewable 81 milliGRAM(s) Oral daily  atorvastatin 20 milliGRAM(s) Oral at bedtime  azithromycin  IVPB      azithromycin  IVPB 500 milliGRAM(s) IV Intermittent every 24 hours  cefTRIAXone   IVPB 2000 milliGRAM(s) IV Intermittent every 24 hours  finasteride 5 milliGRAM(s) Oral daily  fluticasone propionate/ salmeterol 250-50 MICROgram(s) Diskus 1 Dose(s) Inhalation two times a day  furosemide    Tablet 40 milliGRAM(s) Oral daily  heparin   Injectable 5000 Unit(s) SubCutaneous every 8 hours  insulin lispro (ADMELOG) corrective regimen sliding scale   SubCutaneous three times a day before meals  insulin lispro (ADMELOG) corrective regimen sliding scale   SubCutaneous at bedtime  insulin NPH human recombinant 46 Unit(s) SubCutaneous at bedtime  insulin NPH human recombinant 58 Unit(s) SubCutaneous before breakfast  latanoprost 0.005% Ophthalmic Solution 1 Drop(s) Both EYES at bedtime  lidocaine   4% Patch 1 Patch Transdermal daily  methylPREDNISolone sodium succinate Injectable 40 milliGRAM(s) IV Push daily  nicotine -  14 mG/24Hr(s) Patch 1 Patch Transdermal daily  oxyCODONE    IR 2.5 milliGRAM(s) Oral every 4 hours PRN  oxyCODONE    IR 5 milliGRAM(s) Oral every 4 hours PRN  polyethylene glycol 3350 17 Gram(s) Oral daily  potassium phosphate / sodium phosphate Tablet (K-PHOS No. 2) 2 Tablet(s) Oral three times a day  potassium phosphate / sodium phosphate Tablet (K-PHOS No. 2) 1 Tablet(s) Oral two times a day  risperiDONE   Tablet 0.25 milliGRAM(s) Oral at bedtime  senna 2 Tablet(s) Oral at bedtime  tamsulosin 0.4 milliGRAM(s) Oral at bedtime  tiotropium 2.5 MICROgram(s) Inhaler 2 Puff(s) Inhalation daily  traZODone 150 milliGRAM(s) Oral daily    Drug Dosing Weight  Height (cm): 172.7 (29 Mar 2025 16:46)  Weight (kg): 96.3 (29 Mar 2025 16:46)  BMI (kg/m2): 32.3 (29 Mar 2025 16:46)  BSA (m2): 2.1 (29 Mar 2025 16:46)    CENTRAL LINE: [ ] YES [x ] NO  LOCATION:   DATE INSERTED:  REMOVE: [ ] YES [ ] NO  EXPLAIN:    MT: [ x] YES [ ] NO    DATE INSERTED: 3/29/25  REMOVE:  [ ] YES [x ] NO  EXPLAIN: will attempt TOV today    PAST MEDICAL & SURGICAL HISTORY:  COPD (chronic obstructive pulmonary disease)  Oxygen 2-3L at home      Stented coronary artery  2017      HLD (hyperlipidemia)      Pulmonary HTN      Type 2 diabetes mellitus without complication, with long-term current use of insulin      DM (diabetes mellitus)      CAD (coronary artery disease)      GERD (gastroesophageal reflux disease)      Insomnia      CHF (congestive heart failure)      HTN (hypertension)      Mood disorder      COPD (chronic obstructive pulmonary disease)      HTN (hypertension)      DM (diabetes mellitus)      CAD (coronary artery disease)      Bipolar disorder      CYNTHIA treated with BiPAP      No significant past surgical history          04-03 @ 07:01  -  04-04 @ 07:00  --------------------------------------------------------  IN: 4160 mL / OUT: 2650 mL / NET: 1510 mL            PHYSICAL EXAM:    GENERAL: NAD, well-groomed, well-developed  HEAD:  Atraumatic, Normocephalic  EYES: EOMI, PERRLA, conjunctiva and sclera clear  ENMT: No tonsillar erythema, exudates, or enlargement; Moist mucous membranes, Good dentition, No lesions  NECK: Supple, No JVD, Normal thyroid  NERVOUS SYSTEM:  Alert & Oriented X1-2, poor concentration; confused Motor Strength 5/5 B/L upper and lower extremities; DTRs 2+ intact and symmetric  CHEST/LUNG: diminished, good air flow  HEART: Regular rate and rhythm; No murmurs, rubs, or gallops  ABDOMEN: Soft, Nontender, Nondistended; Bowel sounds present  EXTREMITIES:  2+ Peripheral Pulses, No clubbing, cyanosis, or edema  LYMPH: No lymphadenopathy noted  SKIN: tattoos, No rashes or lesions      LABS:  CBC Full  -  ( 04 Apr 2025 06:44 )  WBC Count : 10.90 K/uL  RBC Count : 4.55 M/uL  Hemoglobin : 12.4 g/dL  Hematocrit : 38.4 %  Platelet Count - Automated : 156 K/uL  Mean Cell Volume : 84.4 fl  Mean Cell Hemoglobin : 27.3 pg  Mean Cell Hemoglobin Concentration : 32.3 g/dL  Auto Neutrophil # : x  Auto Lymphocyte # : x  Auto Monocyte # : x  Auto Eosinophil # : x  Auto Basophil # : x  Auto Neutrophil % : x  Auto Lymphocyte % : x  Auto Monocyte % : x  Auto Eosinophil % : x  Auto Basophil % : x    04-04    133[L]  |  97  |  16  ----------------------------<  205[H]  3.5   |  27  |  0.62    Ca    8.2[L]      04 Apr 2025 06:44  Phos  2.4     04-04  Mg     2.1     04-04    TPro  6.6  /  Alb  2.5[L]  /  TBili  0.5  /  DBili  x   /  AST  18  /  ALT  21  /  AlkPhos  63  04-03      Urinalysis Basic - ( 04 Apr 2025 06:44 )    Color: x / Appearance: x / SG: x / pH: x  Gluc: 205 mg/dL / Ketone: x  / Bili: x / Urobili: x   Blood: x / Protein: x / Nitrite: x   Leuk Esterase: x / RBC: x / WBC x   Sq Epi: x / Non Sq Epi: x / Bacteria: x            [ x ]  DVT Prophylaxis  [  ]  Nutrition, Brand, Rate         Goal Rate        Abnormal Nutritional Status -  Malnutrition   Cachexia        RADIOLOGY & ADDITIONAL STUDIES:  ***  < from: Xray Chest 1 View- PORTABLE-Urgent (Xray Chest 1 View- PORTABLE-Urgent .) (04.02.25 @ 10:15) >  ACC: [id number] EXAM:  XR CHEST PORTABLE URGENT 1V   ORDERED BY: ASHISH ELDRIDGE     ACC: [id number] EXAM:  XR CHEST PORTABLE URGENT 1V   ORDERED BY: RAZA CARDENAS     PROCEDURE DATE:  04/01/2025          INTERPRETATION:  Chest one view 4/1/2025 5:46 PM    HISTORY: NG tube placement    COMPARISON STUDY: Earlier the same afternoon    Frontal expiratory view of the chest shows the heart to be similar in   size. Endotracheal tube reaches the lower trachea. Feeding tube reaches   the central stomach.    The lungs show atelectasis or infiltrate of the lower right lung with   similar prominence of the central pulmonary arteries and there is no   evidence of pneumothorax nor pleural effusion.    Chest one view 4/2/2025 9:54 AM  Compared to the prior study, there is progression of right base   infiltrate. No pneumothorax.    IMPRESSION:  Right base infiltrate. No pneumothorax.        Thank you for the courtesy of this referral.    --- End of Report ---    < end of copied text >  < from: TTE W or WO Ultrasound Enhancing Agent (03.30.25 @ 13:43) >     CONCLUSIONS:      1. Technically difficult image quality.   2. VIRTUALLY UNINTERPRETABLE IMAGES.   3. Unable to evaluate left ventricular ejection fraction.   4. The right ventricle is not well visualized. probably normal right ventricular systolic function.   5. Estimated pulmonary artery systolic pressure is 53 mmHg, consistent with moderate pulmonary hypertension.    ________________________________________________________________________________________    < end of copied text >    Goals of Care Discussion with Family/Proxy/Other     DEVIKA CARBALLO    SCU progress note    INTERVAL HPI/OVERNIGHT EVENTS: *** downgraded overnight from ICU    DNR [ ]   DNI  [  ] Full Code    Covid - 19 PCR:     The 4Ms    What Matters Most: see GOC  Age appropriate Medications/Screen for High Risk Medication: Yes  Mentation: see CAM below  Mobility: defer to physical exam    The Confusion Assessment Method (CAM) Diagnostic Algorithm     1: Acute Onset or Fluctuating Course  - Is there evidence of an acute change in mental status from the patient’s baseline? Did the (abnormal) behavior  fluctuate during the day, that is, tend to come and go, or increase and decrease in severity?  [ ] YES [ x] NO     2: Inattention  - Did the patient have difficulty focusing attention, being easily distractible, or having difficulty keeping track of what was being said?  [ ] YES [x ] NO     3: Disorganized thinking  -Was the patient’s thinking disorganized or incoherent, such as rambling or irrelevant conversation, unclear or illogical flow of ideas, or unpredictable switching from subject to subject?  [ ] YES [ x] NO    4: Altered Level of consciousness?  [ ] YES [x ] NO    The diagnosis of delirium by CAM requires the presence of features 1 and 2 and either 3 or 4.    PRESSORS: [ ] YES [x ] NO  azithromycin  IVPB      azithromycin  IVPB 500 milliGRAM(s) IV Intermittent every 24 hours  cefTRIAXone   IVPB 2000 milliGRAM(s) IV Intermittent every 24 hours    Cardiovascular:  Heart Failure  Acute   Acute on Chronic  Chronic       furosemide    Tablet 40 milliGRAM(s) Oral daily    Pulmonary:  albuterol/ipratropium for Nebulization 3 milliLiter(s) Nebulizer every 6 hours PRN  fluticasone propionate/ salmeterol 250-50 MICROgram(s) Diskus 1 Dose(s) Inhalation two times a day  tiotropium 2.5 MICROgram(s) Inhaler 2 Puff(s) Inhalation daily    Hematalogic:  aspirin  chewable 81 milliGRAM(s) Oral daily  heparin   Injectable 5000 Unit(s) SubCutaneous every 8 hours    Other:  acetaminophen     Tablet .. 1000 milliGRAM(s) Oral every 8 hours  ALPRAZolam 0.5 milliGRAM(s) Oral three times a day PRN  artificial  tears Solution 1 Drop(s) Both EYES two times a day  atorvastatin 20 milliGRAM(s) Oral at bedtime  finasteride 5 milliGRAM(s) Oral daily  insulin lispro (ADMELOG) corrective regimen sliding scale   SubCutaneous three times a day before meals  insulin lispro (ADMELOG) corrective regimen sliding scale   SubCutaneous at bedtime  insulin NPH human recombinant 46 Unit(s) SubCutaneous at bedtime  insulin NPH human recombinant 58 Unit(s) SubCutaneous before breakfast  latanoprost 0.005% Ophthalmic Solution 1 Drop(s) Both EYES at bedtime  lidocaine   4% Patch 1 Patch Transdermal daily  methylPREDNISolone sodium succinate Injectable 40 milliGRAM(s) IV Push daily  nicotine -  14 mG/24Hr(s) Patch 1 Patch Transdermal daily  oxyCODONE    IR 2.5 milliGRAM(s) Oral every 4 hours PRN  oxyCODONE    IR 5 milliGRAM(s) Oral every 4 hours PRN  polyethylene glycol 3350 17 Gram(s) Oral daily  potassium phosphate / sodium phosphate Tablet (K-PHOS No. 2) 2 Tablet(s) Oral three times a day  potassium phosphate / sodium phosphate Tablet (K-PHOS No. 2) 1 Tablet(s) Oral two times a day  risperiDONE   Tablet 0.25 milliGRAM(s) Oral at bedtime  senna 2 Tablet(s) Oral at bedtime  tamsulosin 0.4 milliGRAM(s) Oral at bedtime  traZODone 150 milliGRAM(s) Oral daily    acetaminophen     Tablet .. 1000 milliGRAM(s) Oral every 8 hours  albuterol/ipratropium for Nebulization 3 milliLiter(s) Nebulizer every 6 hours PRN  ALPRAZolam 0.5 milliGRAM(s) Oral three times a day PRN  artificial  tears Solution 1 Drop(s) Both EYES two times a day  aspirin  chewable 81 milliGRAM(s) Oral daily  atorvastatin 20 milliGRAM(s) Oral at bedtime  azithromycin  IVPB      azithromycin  IVPB 500 milliGRAM(s) IV Intermittent every 24 hours  cefTRIAXone   IVPB 2000 milliGRAM(s) IV Intermittent every 24 hours  finasteride 5 milliGRAM(s) Oral daily  fluticasone propionate/ salmeterol 250-50 MICROgram(s) Diskus 1 Dose(s) Inhalation two times a day  furosemide    Tablet 40 milliGRAM(s) Oral daily  heparin   Injectable 5000 Unit(s) SubCutaneous every 8 hours  insulin lispro (ADMELOG) corrective regimen sliding scale   SubCutaneous three times a day before meals  insulin lispro (ADMELOG) corrective regimen sliding scale   SubCutaneous at bedtime  insulin NPH human recombinant 46 Unit(s) SubCutaneous at bedtime  insulin NPH human recombinant 58 Unit(s) SubCutaneous before breakfast  latanoprost 0.005% Ophthalmic Solution 1 Drop(s) Both EYES at bedtime  lidocaine   4% Patch 1 Patch Transdermal daily  methylPREDNISolone sodium succinate Injectable 40 milliGRAM(s) IV Push daily  nicotine -  14 mG/24Hr(s) Patch 1 Patch Transdermal daily  oxyCODONE    IR 2.5 milliGRAM(s) Oral every 4 hours PRN  oxyCODONE    IR 5 milliGRAM(s) Oral every 4 hours PRN  polyethylene glycol 3350 17 Gram(s) Oral daily  potassium phosphate / sodium phosphate Tablet (K-PHOS No. 2) 2 Tablet(s) Oral three times a day  potassium phosphate / sodium phosphate Tablet (K-PHOS No. 2) 1 Tablet(s) Oral two times a day  risperiDONE   Tablet 0.25 milliGRAM(s) Oral at bedtime  senna 2 Tablet(s) Oral at bedtime  tamsulosin 0.4 milliGRAM(s) Oral at bedtime  tiotropium 2.5 MICROgram(s) Inhaler 2 Puff(s) Inhalation daily  traZODone 150 milliGRAM(s) Oral daily    Drug Dosing Weight  Height (cm): 172.7 (29 Mar 2025 16:46)  Weight (kg): 96.3 (29 Mar 2025 16:46)  BMI (kg/m2): 32.3 (29 Mar 2025 16:46)  BSA (m2): 2.1 (29 Mar 2025 16:46)    CENTRAL LINE: [ ] YES [x ] NO  LOCATION:   DATE INSERTED:  REMOVE: [ ] YES [ ] NO  EXPLAIN:    MT: [ x] YES [ ] NO    DATE INSERTED: 3/29/25  REMOVE:  [ ] YES [x ] NO  EXPLAIN: will attempt TOV today    PAST MEDICAL & SURGICAL HISTORY:  COPD (chronic obstructive pulmonary disease)  Oxygen 2-3L at home      Stented coronary artery  2017      HLD (hyperlipidemia)      Pulmonary HTN      Type 2 diabetes mellitus without complication, with long-term current use of insulin      DM (diabetes mellitus)      CAD (coronary artery disease)      GERD (gastroesophageal reflux disease)      Insomnia      CHF (congestive heart failure)      HTN (hypertension)      Mood disorder      COPD (chronic obstructive pulmonary disease)      HTN (hypertension)      DM (diabetes mellitus)      CAD (coronary artery disease)      Bipolar disorder      CYNTHIA treated with BiPAP      No significant past surgical history          04-03 @ 07:01  -  04-04 @ 07:00  --------------------------------------------------------  IN: 4160 mL / OUT: 2650 mL / NET: 1510 mL      PHYSICAL EXAM:    GENERAL: NAD, well-groomed, well-developed  HEAD:  Atraumatic, Normocephalic  EYES: EOMI, PERRLA, conjunctiva and sclera clear  ENMT: No tonsillar erythema, exudates, or enlargement; Moist mucous membranes, Good dentition, No lesions  NECK: Supple, No JVD, Normal thyroid  NERVOUS SYSTEM:  Alert & Oriented X1-2, poor concentration; confused Motor Strength 5/5 B/L upper and lower extremities; DTRs 2+ intact and symmetric  CHEST/LUNG: diminished, good air flow  HEART: Regular rate and rhythm; No murmurs, rubs, or gallops  ABDOMEN: Soft, Nontender, Nondistended; Bowel sounds present  EXTREMITIES:  2+ Peripheral Pulses, No clubbing, cyanosis, or edema  LYMPH: No lymphadenopathy noted  SKIN: tattoos, No rashes or lesions      LABS:  CBC Full  -  ( 04 Apr 2025 06:44 )  WBC Count : 10.90 K/uL  RBC Count : 4.55 M/uL  Hemoglobin : 12.4 g/dL  Hematocrit : 38.4 %  Platelet Count - Automated : 156 K/uL  Mean Cell Volume : 84.4 fl  Mean Cell Hemoglobin : 27.3 pg  Mean Cell Hemoglobin Concentration : 32.3 g/dL  Auto Neutrophil # : x  Auto Lymphocyte # : x  Auto Monocyte # : x  Auto Eosinophil # : x  Auto Basophil # : x  Auto Neutrophil % : x  Auto Lymphocyte % : x  Auto Monocyte % : x  Auto Eosinophil % : x  Auto Basophil % : x    04-04    133[L]  |  97  |  16  ----------------------------<  205[H]  3.5   |  27  |  0.62    Ca    8.2[L]      04 Apr 2025 06:44  Phos  2.4     04-04  Mg     2.1     04-04    TPro  6.6  /  Alb  2.5[L]  /  TBili  0.5  /  DBili  x   /  AST  18  /  ALT  21  /  AlkPhos  63  04-03      Urinalysis Basic - ( 04 Apr 2025 06:44 )    Color: x / Appearance: x / SG: x / pH: x  Gluc: 205 mg/dL / Ketone: x  / Bili: x / Urobili: x   Blood: x / Protein: x / Nitrite: x   Leuk Esterase: x / RBC: x / WBC x   Sq Epi: x / Non Sq Epi: x / Bacteria: x            [ x ]  DVT Prophylaxis  [  ]  Nutrition, Brand, Rate         Goal Rate        Abnormal Nutritional Status -  Malnutrition   Cachexia        RADIOLOGY & ADDITIONAL STUDIES:  ***  < from: Xray Chest 1 View- PORTABLE-Urgent (Xray Chest 1 View- PORTABLE-Urgent .) (04.02.25 @ 10:15) >  ACC: [id number] EXAM:  XR CHEST PORTABLE URGENT 1V   ORDERED BY: ASHISH ELDRIDGE     ACC: [id number] EXAM:  XR CHEST PORTABLE URGENT 1V   ORDERED BY: RAZA CARDENAS     PROCEDURE DATE:  04/01/2025          INTERPRETATION:  Chest one view 4/1/2025 5:46 PM    HISTORY: NG tube placement    COMPARISON STUDY: Earlier the same afternoon    Frontal expiratory view of the chest shows the heart to be similar in   size. Endotracheal tube reaches the lower trachea. Feeding tube reaches   the central stomach.    The lungs show atelectasis or infiltrate of the lower right lung with   similar prominence of the central pulmonary arteries and there is no   evidence of pneumothorax nor pleural effusion.    Chest one view 4/2/2025 9:54 AM  Compared to the prior study, there is progression of right base   infiltrate. No pneumothorax.    IMPRESSION:  Right base infiltrate. No pneumothorax.        Thank you for the courtesy of this referral.    --- End of Report ---    < end of copied text >  < from: TTE W or WO Ultrasound Enhancing Agent (03.30.25 @ 13:43) >     CONCLUSIONS:      1. Technically difficult image quality.   2. VIRTUALLY UNINTERPRETABLE IMAGES.   3. Unable to evaluate left ventricular ejection fraction.   4. The right ventricle is not well visualized. probably normal right ventricular systolic function.   5. Estimated pulmonary artery systolic pressure is 53 mmHg, consistent with moderate pulmonary hypertension.    ________________________________________________________________________________________    < end of copied text >    Goals of Care Discussion with Family/Proxy/Other     Clear bilaterally, pupils equal, round and reactive to light.

## 2025-04-04 NOTE — CHART NOTE - NSCHARTNOTEFT_GEN_A_CORE
Assessment:   64yMalePatient is a 64y old  Male who presents with a chief complaint of AHRF 2/2 COPD exacerbation (04 Apr 2025 . Pt  Visited. Pt is on O2. Pt Dg from ICU to 4 N on 4/4.  S/P Extubation. Pt  is alert but confused. Pt not able to Provide any information. D/W NSG Po intake is Fair. Will suggest Magic cups. Labs noted. , Na 133. BP Noted. Will suggest D/C DASH /TLC Diet. Lease Restrictive.       Factors impacting intake: [ ] none [ ] nausea  [ ] vomiting [ ] diarrhea [ ] constipation  [ ]chewing problems [ ] swallowing issues  [ ] other:     Diet Prescription: Diet, Regular:   Consistent Carbohydrate {Evening Snacks}  DASH/ TLC {Sodium & Cholesterol Restricted}  Easy to Chew (EASYTOCHEW)  Mildly Thick Liquids (MILDTHICKLIQS) (04-04-25 @ 09:51)    Intake: ~ 75 - 100 % of meal per Flow sheet     Current Weight: Weight (kg): 96.3 (03-29 @ 16:46)  % Weight Change    Pertinent Medications: MEDICATIONS  (STANDING):  acetaminophen     Tablet .. 1000 milliGRAM(s) Oral every 8 hours  artificial  tears Solution 1 Drop(s) Both EYES two times a day  aspirin  chewable 81 milliGRAM(s) Oral daily  atorvastatin 20 milliGRAM(s) Oral at bedtime  azithromycin  IVPB      azithromycin  IVPB 500 milliGRAM(s) IV Intermittent every 24 hours  cefTRIAXone   IVPB 2000 milliGRAM(s) IV Intermittent every 24 hours  finasteride 5 milliGRAM(s) Oral daily  fluconAZOLE   Tablet 200 milliGRAM(s) Oral daily  fluticasone propionate/ salmeterol 250-50 MICROgram(s) Diskus 1 Dose(s) Inhalation two times a day  furosemide    Tablet 40 milliGRAM(s) Oral daily  heparin   Injectable 5000 Unit(s) SubCutaneous every 8 hours  insulin lispro (ADMELOG) corrective regimen sliding scale   SubCutaneous three times a day before meals  insulin lispro (ADMELOG) corrective regimen sliding scale   SubCutaneous at bedtime  insulin NPH human recombinant 46 Unit(s) SubCutaneous at bedtime  insulin NPH human recombinant 58 Unit(s) SubCutaneous before breakfast  latanoprost 0.005% Ophthalmic Solution 1 Drop(s) Both EYES at bedtime  lidocaine   4% Patch 1 Patch Transdermal daily  methylPREDNISolone sodium succinate Injectable 40 milliGRAM(s) IV Push daily  nicotine -  14 mG/24Hr(s) Patch 1 Patch Transdermal daily  polyethylene glycol 3350 17 Gram(s) Oral daily  potassium phosphate / sodium phosphate Tablet (K-PHOS No. 2) 2 Tablet(s) Oral three times a day  potassium phosphate / sodium phosphate Tablet (K-PHOS No. 2) 1 Tablet(s) Oral two times a day  risperiDONE   Tablet 0.25 milliGRAM(s) Oral at bedtime  senna 2 Tablet(s) Oral at bedtime  tamsulosin 0.4 milliGRAM(s) Oral at bedtime  tiotropium 2.5 MICROgram(s) Inhaler 2 Puff(s) Inhalation daily  traZODone 150 milliGRAM(s) Oral daily    MEDICATIONS  (PRN):  albuterol/ipratropium for Nebulization 3 milliLiter(s) Nebulizer every 6 hours PRN Shortness of Breath and/or Wheezing  ALPRAZolam 0.5 milliGRAM(s) Oral three times a day PRN anxiety  oxyCODONE    IR 2.5 milliGRAM(s) Oral every 4 hours PRN Moderate Pain (4 - 6)  oxyCODONE    IR 5 milliGRAM(s) Oral every 4 hours PRN Severe Pain (7 - 10)    Pertinent Labs: 04-04 Na133 mmol/L[L] Glu 205 mg/dL[H] K+ 3.5 mmol/L Cr  0.62 mg/dL BUN 16 mg/dL 04-04 Phos 2.4 mg/dL[L] 04-03 Alb 2.5 g/dL[L] 04-02 Chol --    LDL --    HDL --    Trig 989 mg/dL[H]     CAPILLARY BLOOD GLUCOSE      POCT Blood Glucose.: 299 mg/dL (04 Apr 2025 11:37)  POCT Blood Glucose.: 209 mg/dL (04 Apr 2025 07:52)  POCT Blood Glucose.: 283 mg/dL (03 Apr 2025 22:49)  POCT Blood Glucose.: 366 mg/dL (03 Apr 2025 16:50)    Skin: NO DTI     Estimated Needs:   [ ] no change since previous assessment  [ ] recalculated:     Previous Nutrition Diagnosis:   [ ] Inadequate Energy Intake [ ]Inadequate Oral Intake [ ] Excessive Energy Intake   [ ] Underweight [ ] Increased Nutrient Needs [ ] Overweight/Obesity (x) swallowing Difficulty   [ ] Altered GI Function [ ] Unintended Weight Loss [ ] Food & Nutrition Related Knowledge Deficit [ ] Malnutrition     Nutrition Diagnosis is [ x] ongoing  [ ] resolved [ ] not applicable     New Nutrition Diagnosis: [ ] not applicable       Interventions:   Recommend  [x ] Change Diet To: D/C DASH/ TLC   [x ] Nutrition Supplement MAGIC CUPS BID.   [ ] Nutrition Support  [ ] Other:     Monitoring and Evaluation:   [ ] PO intake [ x ] Tolerance to diet prescription [ x ] weights [ x ] labs[ x ] follow up per protocol  [ ] other:

## 2025-04-04 NOTE — GOALS OF CARE CONVERSATION - ADVANCED CARE PLANNING - CONVERSATION DETAILS
Spoke with sister Chiquita Merrill regarding life sustaining in the event of cardiac and respiratory arrest. Patient previous MOLST is a Full code, and sister wants to continue with CPR and mechanical intubation in the event od cardiac and respiratory decompensation. Patient sister encouraged to verbalize concerns and emotional support given. Patient is a FULL Code.

## 2025-04-04 NOTE — CHART NOTE - NSCHARTNOTEFT_GEN_A_CORE
64M from [facility], w/ PMHx of CAD, CHF, DM, CYNTHIA on nocturnal bipap, COPD (multiple intubations in past on 3L of home O2), peripheral neuropathy, GERD, HTN, HLD, obesity, polyarthritis, Pulmonary HTN, TIA, BPH, depression/anxiety p/w respiratory distress from NH. Placed on BiPAP, s/p lasix 80, solumedrol 125 . Admitted to ICU for AHRF 2/2 COPD vs. CHF exacerbation. Intubated for increased WOB. Tmax 101, started on ABx CTX, PNA for CAP coverage and for suspected aspiration. TTE - poor read , +ve moderate pHTN. Treated with IV lasix, diamox, steroids. Successfully extubated to 2 L NC on 4/2. Patient started on his home nocturnal BiPAP. Patient declined BiPAP. Continued on home medications trazodone, alprazolam, risperidone, atorvastatin, finasteride.     Patient is stable for downgrade to floor under care of Dr. Marquez for further management , covering NP Eryn was informed.    <Things to follow up>.    Things to follow   - Wean off solumedrol  - Last day of Azithro on 4/4

## 2025-04-04 NOTE — PROGRESS NOTE ADULT - PROBLEM SELECTOR PLAN 1
likely COPD, CHF and spiration pneumonia  continue azithromycin and ceftriaxone  continue BIPAP- increased setting I/E: 16/8 as patient obese  continue Duoneb PRN  continue Spiriva respimat   continue Advair  continue Lasix likely COPD, CHF and spiration pneumonia  continue azithromycin and ceftriaxone  continue BIPAP- increased setting I/E: 16/8 as patient obese  continue Duoneb PRN  continue Spiriva respimat   continue Advair  continue Lasix  continue methylprednisolone 40mg daily IV and transition to prednisone taper on Monday

## 2025-04-05 DIAGNOSIS — E11.9 TYPE 2 DIABETES MELLITUS WITHOUT COMPLICATIONS: ICD-10-CM

## 2025-04-05 DIAGNOSIS — J44.9 CHRONIC OBSTRUCTIVE PULMONARY DISEASE, UNSPECIFIED: ICD-10-CM

## 2025-04-05 DIAGNOSIS — R33.9 RETENTION OF URINE, UNSPECIFIED: ICD-10-CM

## 2025-04-05 DIAGNOSIS — I50.9 HEART FAILURE, UNSPECIFIED: ICD-10-CM

## 2025-04-05 DIAGNOSIS — J69.0 PNEUMONITIS DUE TO INHALATION OF FOOD AND VOMIT: ICD-10-CM

## 2025-04-05 DIAGNOSIS — I50.33 ACUTE ON CHRONIC DIASTOLIC (CONGESTIVE) HEART FAILURE: ICD-10-CM

## 2025-04-05 LAB
ALBUMIN SERPL ELPH-MCNC: 2.6 G/DL — LOW (ref 3.5–5)
ALP SERPL-CCNC: 65 U/L — SIGNIFICANT CHANGE UP (ref 40–120)
ALT FLD-CCNC: 41 U/L DA — SIGNIFICANT CHANGE UP (ref 10–60)
ANION GAP SERPL CALC-SCNC: 5 MMOL/L — SIGNIFICANT CHANGE UP (ref 5–17)
AST SERPL-CCNC: 25 U/L — SIGNIFICANT CHANGE UP (ref 10–40)
BILIRUB SERPL-MCNC: 0.4 MG/DL — SIGNIFICANT CHANGE UP (ref 0.2–1.2)
BUN SERPL-MCNC: 20 MG/DL — HIGH (ref 7–18)
CALCIUM SERPL-MCNC: 7.9 MG/DL — LOW (ref 8.4–10.5)
CHLORIDE SERPL-SCNC: 98 MMOL/L — SIGNIFICANT CHANGE UP (ref 96–108)
CO2 SERPL-SCNC: 29 MMOL/L — SIGNIFICANT CHANGE UP (ref 22–31)
CREAT SERPL-MCNC: 0.67 MG/DL — SIGNIFICANT CHANGE UP (ref 0.5–1.3)
EGFR: 106 ML/MIN/1.73M2 — SIGNIFICANT CHANGE UP
EGFR: 106 ML/MIN/1.73M2 — SIGNIFICANT CHANGE UP
GLUCOSE BLDC GLUCOMTR-MCNC: 194 MG/DL — HIGH (ref 70–99)
GLUCOSE BLDC GLUCOMTR-MCNC: 316 MG/DL — HIGH (ref 70–99)
GLUCOSE BLDC GLUCOMTR-MCNC: 335 MG/DL — HIGH (ref 70–99)
GLUCOSE BLDC GLUCOMTR-MCNC: 396 MG/DL — HIGH (ref 70–99)
GLUCOSE SERPL-MCNC: 219 MG/DL — HIGH (ref 70–99)
HCT VFR BLD CALC: 39.7 % — SIGNIFICANT CHANGE UP (ref 39–50)
HGB BLD-MCNC: 12.9 G/DL — LOW (ref 13–17)
MAGNESIUM SERPL-MCNC: 1.9 MG/DL — SIGNIFICANT CHANGE UP (ref 1.6–2.6)
MCHC RBC-ENTMCNC: 27.1 PG — SIGNIFICANT CHANGE UP (ref 27–34)
MCHC RBC-ENTMCNC: 32.5 G/DL — SIGNIFICANT CHANGE UP (ref 32–36)
MCV RBC AUTO: 83.4 FL — SIGNIFICANT CHANGE UP (ref 80–100)
NRBC BLD AUTO-RTO: 0 /100 WBCS — SIGNIFICANT CHANGE UP (ref 0–0)
PHOSPHATE SERPL-MCNC: 3.5 MG/DL — SIGNIFICANT CHANGE UP (ref 2.5–4.5)
PLATELET # BLD AUTO: 162 K/UL — SIGNIFICANT CHANGE UP (ref 150–400)
POTASSIUM SERPL-MCNC: 3.7 MMOL/L — SIGNIFICANT CHANGE UP (ref 3.5–5.3)
POTASSIUM SERPL-SCNC: 3.7 MMOL/L — SIGNIFICANT CHANGE UP (ref 3.5–5.3)
PROT SERPL-MCNC: 6.9 G/DL — SIGNIFICANT CHANGE UP (ref 6–8.3)
RBC # BLD: 4.76 M/UL — SIGNIFICANT CHANGE UP (ref 4.2–5.8)
RBC # FLD: 13.7 % — SIGNIFICANT CHANGE UP (ref 10.3–14.5)
SODIUM SERPL-SCNC: 132 MMOL/L — LOW (ref 135–145)
WBC # BLD: 10.19 K/UL — SIGNIFICANT CHANGE UP (ref 3.8–10.5)
WBC # FLD AUTO: 10.19 K/UL — SIGNIFICANT CHANGE UP (ref 3.8–10.5)

## 2025-04-05 RX ORDER — ENOXAPARIN SODIUM 100 MG/ML
40 INJECTION SUBCUTANEOUS EVERY 24 HOURS
Refills: 0 | Status: DISCONTINUED | OUTPATIENT
Start: 2025-04-05 | End: 2025-04-09

## 2025-04-05 RX ADMIN — INSULIN LISPRO 4: 100 INJECTION, SOLUTION INTRAVENOUS; SUBCUTANEOUS at 11:56

## 2025-04-05 RX ADMIN — Medication 1000 MILLIGRAM(S): at 13:05

## 2025-04-05 RX ADMIN — Medication 1 TABLET(S): at 06:35

## 2025-04-05 RX ADMIN — Medication 200 MILLIGRAM(S): at 12:32

## 2025-04-05 RX ADMIN — Medication 2 TABLET(S): at 21:46

## 2025-04-05 RX ADMIN — Medication 81 MILLIGRAM(S): at 12:32

## 2025-04-05 RX ADMIN — Medication 0.5 MILLIGRAM(S): at 21:46

## 2025-04-05 RX ADMIN — HEPARIN SODIUM 5000 UNIT(S): 1000 INJECTION INTRAVENOUS; SUBCUTANEOUS at 13:04

## 2025-04-05 RX ADMIN — FINASTERIDE 5 MILLIGRAM(S): 1 TABLET, FILM COATED ORAL at 12:32

## 2025-04-05 RX ADMIN — METHYLPREDNISOLONE ACETATE 40 MILLIGRAM(S): 80 INJECTION, SUSPENSION INTRA-ARTICULAR; INTRALESIONAL; INTRAMUSCULAR; SOFT TISSUE at 05:45

## 2025-04-05 RX ADMIN — FUROSEMIDE 40 MILLIGRAM(S): 10 INJECTION INTRAMUSCULAR; INTRAVENOUS at 05:46

## 2025-04-05 RX ADMIN — INSULIN LISPRO 4: 100 INJECTION, SOLUTION INTRAVENOUS; SUBCUTANEOUS at 17:24

## 2025-04-05 RX ADMIN — CEFTRIAXONE 100 MILLIGRAM(S): 500 INJECTION, POWDER, FOR SOLUTION INTRAMUSCULAR; INTRAVENOUS at 08:24

## 2025-04-05 RX ADMIN — Medication 150 MILLIGRAM(S): at 12:33

## 2025-04-05 RX ADMIN — Medication 1 DROP(S): at 17:25

## 2025-04-05 RX ADMIN — Medication 0.25 MILLIGRAM(S): at 21:46

## 2025-04-05 RX ADMIN — ATORVASTATIN CALCIUM 20 MILLIGRAM(S): 80 TABLET, FILM COATED ORAL at 21:46

## 2025-04-05 RX ADMIN — ENOXAPARIN SODIUM 40 MILLIGRAM(S): 100 INJECTION SUBCUTANEOUS at 21:47

## 2025-04-05 RX ADMIN — Medication 46 UNIT(S): at 21:48

## 2025-04-05 RX ADMIN — Medication 1 DROP(S): at 06:35

## 2025-04-05 RX ADMIN — Medication 1000 MILLIGRAM(S): at 21:47

## 2025-04-05 RX ADMIN — Medication 1000 MILLIGRAM(S): at 05:46

## 2025-04-05 RX ADMIN — HEPARIN SODIUM 5000 UNIT(S): 1000 INJECTION INTRAVENOUS; SUBCUTANEOUS at 05:45

## 2025-04-05 RX ADMIN — Medication 58 UNIT(S): at 08:26

## 2025-04-05 RX ADMIN — Medication 1 TABLET(S): at 17:24

## 2025-04-05 RX ADMIN — Medication 250 MILLIGRAM(S): at 13:02

## 2025-04-05 RX ADMIN — Medication 1000 MILLIGRAM(S): at 06:05

## 2025-04-05 RX ADMIN — OXYCODONE HYDROCHLORIDE 5 MILLIGRAM(S): 30 TABLET ORAL at 21:46

## 2025-04-05 RX ADMIN — INSULIN LISPRO 1: 100 INJECTION, SOLUTION INTRAVENOUS; SUBCUTANEOUS at 08:27

## 2025-04-05 RX ADMIN — INSULIN LISPRO 3: 100 INJECTION, SOLUTION INTRAVENOUS; SUBCUTANEOUS at 21:45

## 2025-04-05 RX ADMIN — Medication 1 DOSE(S): at 21:48

## 2025-04-05 RX ADMIN — Medication 1000 MILLIGRAM(S): at 14:05

## 2025-04-05 RX ADMIN — IPRATROPIUM BROMIDE AND ALBUTEROL SULFATE 3 MILLILITER(S): .5; 2.5 SOLUTION RESPIRATORY (INHALATION) at 20:31

## 2025-04-05 RX ADMIN — TAMSULOSIN HYDROCHLORIDE 0.4 MILLIGRAM(S): 0.4 CAPSULE ORAL at 21:49

## 2025-04-05 RX ADMIN — LATANOPROST PF 1 DROP(S): 0.05 SOLUTION/ DROPS OPHTHALMIC at 21:48

## 2025-04-05 NOTE — SWALLOW BEDSIDE ASSESSMENT ADULT - COMMENTS
Pt awake, alert, oriented x3 , responsive to all queries. 3L via NC in place during assessment. Patient repositioned upright and demonstrated adequate range of motion and strength across articulators. Patient edentulous and unsure where dentures are at time of evaluation. Reports wearing dentures for all PO at baseline and endorses difficulty masticating current diet order (easy to chew).      CXR IMPRESSION: Right base infiltrate. No pneumothorax.

## 2025-04-05 NOTE — PROGRESS NOTE ADULT - ASSESSMENT
64M from [facility], w/ PMHx of CAD, CHF, DM, CYNTHIA on nocturnal bipap, COPD (multiple intubations in past on 3L of home O2), peripheral neuropathy, GERD, HTN, HLD, obesity, polyarthritis, Pulmonary HTN, TIA, BPH, depression/anxiety p/w respiratory distress from NH. Placed on BiPAP, s/p lasix 80, solumedrol 125. Admitted to ICU for AHHRF 2/2 COPD, possible CHF exacerbation, aspiration pneumonia. Intubated for increased WOB and fever. TTE resulted poor read , positive  moderate pHTN. Treated with IV lasix, diamox, steroids. Successfully extubated to 2 L NC on 4/2. Patient started on his home nocturnal BiPAP. Patient declined BiPAP. Continued on home medications trazodone, alprazolam, risperidone, atorvastatin, finasteride. Patient downgraded to AI. Blood culture negative, sputum culture negative.  4/4: Patient complain of lower back pain, started acetaminophen 1G q8h x3days, lidocaine daily PRN oxycodone. Patient pending Speech and swallow evaluation for aspiration. Found to have Candidiasis UTI treating with Fluconazole 14 days.   4/5 Failed TOV. Indwelling mark cath placed.

## 2025-04-05 NOTE — CHART NOTE - NSCHARTNOTEFT_GEN_A_CORE
Patient had attempted TOV starting from 4/5 at 9:30 am Te[place]ada removal, ,pt had first bladder scan done at 6: 30 pm resulted in 438 cc retained urine , pt was straight cath and output was 200 cc of urine, second bladder scan done at 2: 30 pm - obtained 611cc of retained urine after patient having BM and voided unknown amount of urine , straight cath ordered , received 450 cc of urine. Pt is on Flomax already, endorsed to day ACP to f/u on bladder scan # 3 at 8 : 30 am and place Tejada catheter order if urinary retention remaining .    Vital Signs Last 24 Hrs  T(C): 36.4 (05 Apr 2025 05:20), Max: 36.4 (04 Apr 2025 21:28)  T(F): 97.6 (05 Apr 2025 05:20), Max: 97.6 (04 Apr 2025 21:28)  HR: 79 (05 Apr 2025 05:20) (79 - 93)  BP: 125/67 (05 Apr 2025 05:20) (120/79 - 125/67)  BP(mean): 79 (05 Apr 2025 05:20) (79 - 79)  RR: 18 (05 Apr 2025 05:20) (18 - 18)  SpO2: 98% (05 Apr 2025 05:20) (94% - 98%)    Parameters below as of 05 Apr 2025 05:20  Patient On (Oxygen Delivery Method): nasal cannula  O2 Flow (L/min): 4      Gabriela Brice NP-C  Medicine Department   [facility]

## 2025-04-05 NOTE — PROGRESS NOTE ADULT - PROBLEM SELECTOR PLAN 1
due  to aspiration pneumonia , possible CHF exacerbation, End stage COPD  Continue BIPAP qhs and as needed. Settings increased : IPAP 16/EPAP 8 due to pt obese.   Continue Supplemental oxygen . Wean as tolerated to keep SpO2 >92%  Monitor oxygenation.   Continue Azithromycin, Ceftriaxone  Continue Advair, spiriva  Continue IV solumedrol 40mg daily. Transition to prednisone with taper starting  Monday.   Continue Lasix

## 2025-04-05 NOTE — PROGRESS NOTE ADULT - PROBLEM SELECTOR PLAN 9
A1C 11.3  Continue NPH 58 units qam, 46 units qHS  Continue glucose monitoring ac/HS and coverage with Admog s/s

## 2025-04-05 NOTE — SWALLOW BEDSIDE ASSESSMENT ADULT - SLP PERTINENT HISTORY OF CURRENT PROBLEM
Per chart review, patient is a 64M from [facility], w/ PMHx of CAD, CHF, DM, CYNTHIA on nocturnal bipap, COPD (multiple intubations in past on 3L of home O2), peripheral neuropathy, GERD, HTN, HLD, obesity, polyarthritis, Pulmonary HTN, TIA, BPH, depression/anxiety p/w respiratory distress from NH. Patient admitted to ICU for AHRF 2/2 COPD vs. CHF exacerbation. Hospitalization course complicated by increased WOB & suspected aspiration + subsequently Intubated. Pt extubated on 4/2 and now s/p ICU downgrade

## 2025-04-05 NOTE — PROGRESS NOTE ADULT - SUBJECTIVE AND OBJECTIVE BOX
DEVIKA CARBALLO    SCU progress note    INTERVAL HPI/OVERNIGHT EVENTS: ***    DNR [ ]   DNI  [  ]       FULL CODE [   ]    Covid - 19 PCR:     The 4Ms    What Matters Most: see GOC  Age appropriate Medications/Screen for High Risk Medication: Yes  Mentation: see CAM below  Mobility: defer to physical exam    The Confusion Assessment Method (CAM) Diagnostic Algorithm     1: Acute Onset or Fluctuating Course  - Is there evidence of an acute change in mental status from the patient’s baseline? Did the (abnormal) behavior  fluctuate during the day, that is, tend to come and go, or increase and decrease in severity?  [ ] YES [ ] NO    [  ]  UNABLE TO ASSESS      2: Inattention  - Did the patient have difficulty focusing attention, being easily distractible, or having difficulty keeping track of what was being said?  [ ] YES [ ] NO      [  ]  UNABLE TO ASSESS      3: Disorganized thinking  -Was the patient’s thinking disorganized or incoherent, such as rambling or irrelevant conversation, unclear or illogical flow of ideas, or unpredictable switching from subject to subject?  [ ] YES [ ] NO      [  ]  UNABLE TO ASSESS     4: Altered Level of consciousness?  [ ] YES [ ] NO    The diagnosis of delirium by CAM requires the presence of features 1 and 2 and either 3 or 4.    PRESSORS: [ ] YES [ ] NO  azithromycin  IVPB      azithromycin  IVPB 500 milliGRAM(s) IV Intermittent every 24 hours  fluconAZOLE   Tablet 200 milliGRAM(s) Oral daily    Cardiovascular:  Heart Failure  Acute   Acute on Chronic  Chronic       furosemide    Tablet 40 milliGRAM(s) Oral daily    Pulmonary:  albuterol/ipratropium for Nebulization 3 milliLiter(s) Nebulizer every 6 hours PRN  fluticasone propionate/ salmeterol 250-50 MICROgram(s) Diskus 1 Dose(s) Inhalation two times a day  tiotropium 2.5 MICROgram(s) Inhaler 2 Puff(s) Inhalation daily    Hematologic:  aspirin  chewable 81 milliGRAM(s) Oral daily  heparin   Injectable 5000 Unit(s) SubCutaneous every 8 hours    Other:  acetaminophen     Tablet .. 1000 milliGRAM(s) Oral every 8 hours  ALPRAZolam 0.5 milliGRAM(s) Oral three times a day PRN  artificial  tears Solution 1 Drop(s) Both EYES two times a day  atorvastatin 20 milliGRAM(s) Oral at bedtime  finasteride 5 milliGRAM(s) Oral daily  insulin lispro (ADMELOG) corrective regimen sliding scale   SubCutaneous three times a day before meals  insulin lispro (ADMELOG) corrective regimen sliding scale   SubCutaneous at bedtime  insulin NPH human recombinant 46 Unit(s) SubCutaneous at bedtime  insulin NPH human recombinant 58 Unit(s) SubCutaneous before breakfast  latanoprost 0.005% Ophthalmic Solution 1 Drop(s) Both EYES at bedtime  lidocaine   4% Patch 1 Patch Transdermal daily  methylPREDNISolone sodium succinate Injectable 40 milliGRAM(s) IV Push daily  nicotine -  14 mG/24Hr(s) Patch 1 Patch Transdermal daily  oxyCODONE    IR 2.5 milliGRAM(s) Oral every 4 hours PRN  oxyCODONE    IR 5 milliGRAM(s) Oral every 4 hours PRN  polyethylene glycol 3350 17 Gram(s) Oral daily  potassium phosphate / sodium phosphate Tablet (K-PHOS No. 2) 1 Tablet(s) Oral two times a day  risperiDONE   Tablet 0.25 milliGRAM(s) Oral at bedtime  senna 2 Tablet(s) Oral at bedtime  tamsulosin 0.4 milliGRAM(s) Oral at bedtime  traZODone 150 milliGRAM(s) Oral daily    acetaminophen     Tablet .. 1000 milliGRAM(s) Oral every 8 hours  albuterol/ipratropium for Nebulization 3 milliLiter(s) Nebulizer every 6 hours PRN  ALPRAZolam 0.5 milliGRAM(s) Oral three times a day PRN  artificial  tears Solution 1 Drop(s) Both EYES two times a day  aspirin  chewable 81 milliGRAM(s) Oral daily  atorvastatin 20 milliGRAM(s) Oral at bedtime  azithromycin  IVPB      azithromycin  IVPB 500 milliGRAM(s) IV Intermittent every 24 hours  finasteride 5 milliGRAM(s) Oral daily  fluconAZOLE   Tablet 200 milliGRAM(s) Oral daily  fluticasone propionate/ salmeterol 250-50 MICROgram(s) Diskus 1 Dose(s) Inhalation two times a day  furosemide    Tablet 40 milliGRAM(s) Oral daily  heparin   Injectable 5000 Unit(s) SubCutaneous every 8 hours  insulin lispro (ADMELOG) corrective regimen sliding scale   SubCutaneous three times a day before meals  insulin lispro (ADMELOG) corrective regimen sliding scale   SubCutaneous at bedtime  insulin NPH human recombinant 46 Unit(s) SubCutaneous at bedtime  insulin NPH human recombinant 58 Unit(s) SubCutaneous before breakfast  latanoprost 0.005% Ophthalmic Solution 1 Drop(s) Both EYES at bedtime  lidocaine   4% Patch 1 Patch Transdermal daily  methylPREDNISolone sodium succinate Injectable 40 milliGRAM(s) IV Push daily  nicotine -  14 mG/24Hr(s) Patch 1 Patch Transdermal daily  oxyCODONE    IR 2.5 milliGRAM(s) Oral every 4 hours PRN  oxyCODONE    IR 5 milliGRAM(s) Oral every 4 hours PRN  polyethylene glycol 3350 17 Gram(s) Oral daily  potassium phosphate / sodium phosphate Tablet (K-PHOS No. 2) 1 Tablet(s) Oral two times a day  risperiDONE   Tablet 0.25 milliGRAM(s) Oral at bedtime  senna 2 Tablet(s) Oral at bedtime  tamsulosin 0.4 milliGRAM(s) Oral at bedtime  tiotropium 2.5 MICROgram(s) Inhaler 2 Puff(s) Inhalation daily  traZODone 150 milliGRAM(s) Oral daily    Drug Dosing Weight  Height (cm): 172.7 (29 Mar 2025 16:46)  Weight (kg): 96.3 (29 Mar 2025 16:46)  BMI (kg/m2): 32.3 (29 Mar 2025 16:46)  BSA (m2): 2.1 (29 Mar 2025 16:46)    CENTRAL LINE: [ ] YES [ ] NO  LOCATION:   DATE INSERTED:  REMOVE: [ ] YES [ ] NO  EXPLAIN:    MT: [ ] YES [ ] NO    DATE INSERTED:  REMOVE:  [ ] YES [ ] NO  EXPLAIN:    PAST MEDICAL & SURGICAL HISTORY:  COPD (chronic obstructive pulmonary disease)  Oxygen 2-3L at home      Stented coronary artery  2017      HLD (hyperlipidemia)      Pulmonary HTN      Type 2 diabetes mellitus without complication, with long-term current use of insulin      DM (diabetes mellitus)      CAD (coronary artery disease)      GERD (gastroesophageal reflux disease)      Insomnia      CHF (congestive heart failure)      HTN (hypertension)      Mood disorder      COPD (chronic obstructive pulmonary disease)      HTN (hypertension)      DM (diabetes mellitus)      CAD (coronary artery disease)      Bipolar disorder      CYNTHIA treated with BiPAP      No significant past surgical history                  04-04 @ 07:01  -  04-05 @ 07:00  --------------------------------------------------------  IN: 0 mL / OUT: 400 mL / NET: -400 mL            PHYSICAL EXAM:        LABS:  CBC Full  -  ( 05 Apr 2025 07:45 )  WBC Count : 10.19 K/uL  RBC Count : 4.76 M/uL  Hemoglobin : 12.9 g/dL  Hematocrit : 39.7 %  Platelet Count - Automated : 162 K/uL  Mean Cell Volume : 83.4 fl  Mean Cell Hemoglobin : 27.1 pg  Mean Cell Hemoglobin Concentration : 32.5 g/dL  Auto Neutrophil # : x  Auto Lymphocyte # : x  Auto Monocyte # : x  Auto Eosinophil # : x  Auto Basophil # : x  Auto Neutrophil % : x  Auto Lymphocyte % : x  Auto Monocyte % : x  Auto Eosinophil % : x  Auto Basophil % : x    04-04    133[L]  |  97  |  16  ----------------------------<  205[H]  3.5   |  27  |  0.62    Ca    8.2[L]      04 Apr 2025 06:44  Phos  2.4     04-04  Mg     2.1     04-04        Urinalysis Basic - ( 04 Apr 2025 06:44 )    Color: x / Appearance: x / SG: x / pH: x  Gluc: 205 mg/dL / Ketone: x  / Bili: x / Urobili: x   Blood: x / Protein: x / Nitrite: x   Leuk Esterase: x / RBC: x / WBC x   Sq Epi: x / Non Sq Epi: x / Bacteria: x            [  ]  DVT Prophylaxis  [  ]   Abnormal Nutritional Status -  Malnutrition   Cachexia      Morbid Obesity BMI >/=40  Diet, Easy to Chew:   Consistent Carbohydrate Evening Snacks  Mildly Thick Liquids (MILDTHICKLIQS)  Supplement Feeding Modality:  Oral (04-04-25 @ 15:58) [Pending Verification By Attending]  Diet, Regular:   Consistent Carbohydrate Evening Snacks  DASH/TLC Sodium & Cholesterol Restricted  Easy to Chew (EASYTOCHEW)  Mildly Thick Liquids (MILDTHICKLIQS) (04-04-25 @ 09:51) [Active]          RADIOLOGY & ADDITIONAL STUDIES:  ***    Goals of Care Discussion with Family/Proxy/Other   - see note from     DEVIKA CARBALLO    SCU progress note    INTERVAL HPI/OVERNIGHT EVENTS: ***Failed TOV.   Pt seen and examined this morning. Pt sitting up in bed, endorses feeling okay, breathing is okay. Pt denies SOB, chest discomfort, abdominal discomfort.     FULL CODE [ x  ]    Covid - 19 PCR:     The 4Ms    What Matters Most: see GOC  Age appropriate Medications/Screen for High Risk Medication: Yes  Mentation: see CAM below  Mobility: defer to physical exam    The Confusion Assessment Method (CAM) Diagnostic Algorithm     1: Acute Onset or Fluctuating Course  - Is there evidence of an acute change in mental status from the patient’s baseline? Did the (abnormal) behavior  fluctuate during the day, that is, tend to come and go, or increase and decrease in severity?  [ ] YES [ x] NO    [  ]  UNABLE TO ASSESS      2: Inattention  - Did the patient have difficulty focusing attention, being easily distractible, or having difficulty keeping track of what was being said?  [ ] YES [x ] NO      [  ]  UNABLE TO ASSESS      3: Disorganized thinking  -Was the patient’s thinking disorganized or incoherent, such as rambling or irrelevant conversation, unclear or illogical flow of ideas, or unpredictable switching from subject to subject?  [ ] YES [ x] NO      [  ]  UNABLE TO ASSESS     4: Altered Level of consciousness?  [ ] YES [x ] NO    The diagnosis of delirium by CAM requires the presence of features 1 and 2 and either 3 or 4.    PRESSORS: [ ] YES [ x] NO  azithromycin  IVPB      azithromycin  IVPB 500 milliGRAM(s) IV Intermittent every 24 hours  fluconAZOLE   Tablet 200 milliGRAM(s) Oral daily    Cardiovascular:  Heart Failure  Acute   Acute on Chronic  Chronic       furosemide    Tablet 40 milliGRAM(s) Oral daily    Pulmonary:  albuterol/ipratropium for Nebulization 3 milliLiter(s) Nebulizer every 6 hours PRN  fluticasone propionate/ salmeterol 250-50 MICROgram(s) Diskus 1 Dose(s) Inhalation two times a day  tiotropium 2.5 MICROgram(s) Inhaler 2 Puff(s) Inhalation daily    Hematologic:  aspirin  chewable 81 milliGRAM(s) Oral daily  heparin   Injectable 5000 Unit(s) SubCutaneous every 8 hours    Other:  acetaminophen     Tablet .. 1000 milliGRAM(s) Oral every 8 hours  ALPRAZolam 0.5 milliGRAM(s) Oral three times a day PRN  artificial  tears Solution 1 Drop(s) Both EYES two times a day  atorvastatin 20 milliGRAM(s) Oral at bedtime  finasteride 5 milliGRAM(s) Oral daily  insulin lispro (ADMELOG) corrective regimen sliding scale   SubCutaneous three times a day before meals  insulin lispro (ADMELOG) corrective regimen sliding scale   SubCutaneous at bedtime  insulin NPH human recombinant 46 Unit(s) SubCutaneous at bedtime  insulin NPH human recombinant 58 Unit(s) SubCutaneous before breakfast  latanoprost 0.005% Ophthalmic Solution 1 Drop(s) Both EYES at bedtime  lidocaine   4% Patch 1 Patch Transdermal daily  methylPREDNISolone sodium succinate Injectable 40 milliGRAM(s) IV Push daily  nicotine -  14 mG/24Hr(s) Patch 1 Patch Transdermal daily  oxyCODONE    IR 2.5 milliGRAM(s) Oral every 4 hours PRN  oxyCODONE    IR 5 milliGRAM(s) Oral every 4 hours PRN  polyethylene glycol 3350 17 Gram(s) Oral daily  potassium phosphate / sodium phosphate Tablet (K-PHOS No. 2) 1 Tablet(s) Oral two times a day  risperiDONE   Tablet 0.25 milliGRAM(s) Oral at bedtime  senna 2 Tablet(s) Oral at bedtime  tamsulosin 0.4 milliGRAM(s) Oral at bedtime  traZODone 150 milliGRAM(s) Oral daily    acetaminophen     Tablet .. 1000 milliGRAM(s) Oral every 8 hours  albuterol/ipratropium for Nebulization 3 milliLiter(s) Nebulizer every 6 hours PRN  ALPRAZolam 0.5 milliGRAM(s) Oral three times a day PRN  artificial  tears Solution 1 Drop(s) Both EYES two times a day  aspirin  chewable 81 milliGRAM(s) Oral daily  atorvastatin 20 milliGRAM(s) Oral at bedtime  azithromycin  IVPB      azithromycin  IVPB 500 milliGRAM(s) IV Intermittent every 24 hours  finasteride 5 milliGRAM(s) Oral daily  fluconAZOLE   Tablet 200 milliGRAM(s) Oral daily  fluticasone propionate/ salmeterol 250-50 MICROgram(s) Diskus 1 Dose(s) Inhalation two times a day  furosemide    Tablet 40 milliGRAM(s) Oral daily  heparin   Injectable 5000 Unit(s) SubCutaneous every 8 hours  insulin lispro (ADMELOG) corrective regimen sliding scale   SubCutaneous three times a day before meals  insulin lispro (ADMELOG) corrective regimen sliding scale   SubCutaneous at bedtime  insulin NPH human recombinant 46 Unit(s) SubCutaneous at bedtime  insulin NPH human recombinant 58 Unit(s) SubCutaneous before breakfast  latanoprost 0.005% Ophthalmic Solution 1 Drop(s) Both EYES at bedtime  lidocaine   4% Patch 1 Patch Transdermal daily  methylPREDNISolone sodium succinate Injectable 40 milliGRAM(s) IV Push daily  nicotine -  14 mG/24Hr(s) Patch 1 Patch Transdermal daily  oxyCODONE    IR 2.5 milliGRAM(s) Oral every 4 hours PRN  oxyCODONE    IR 5 milliGRAM(s) Oral every 4 hours PRN  polyethylene glycol 3350 17 Gram(s) Oral daily  potassium phosphate / sodium phosphate Tablet (K-PHOS No. 2) 1 Tablet(s) Oral two times a day  risperiDONE   Tablet 0.25 milliGRAM(s) Oral at bedtime  senna 2 Tablet(s) Oral at bedtime  tamsulosin 0.4 milliGRAM(s) Oral at bedtime  tiotropium 2.5 MICROgram(s) Inhaler 2 Puff(s) Inhalation daily  traZODone 150 milliGRAM(s) Oral daily    Drug Dosing Weight  Height (cm): 172.7 (29 Mar 2025 16:46)  Weight (kg): 96.3 (29 Mar 2025 16:46)  BMI (kg/m2): 32.3 (29 Mar 2025 16:46)  BSA (m2): 2.1 (29 Mar 2025 16:46)    CENTRAL LINE: [ ] YES [x ] NO  LOCATION:   DATE INSERTED:  REMOVE: [ ] YES [ ] NO  EXPLAIN:    AMRO: [x ] YES [ ] NO    DATE INSERTED:4/5/25  REMOVE:  [ ] YES [ x] NO  EXPLAIN:Failed TOV    PAST MEDICAL & SURGICAL HISTORY:  COPD (chronic obstructive pulmonary disease)  Oxygen 2-3L at home  Stented coronary artery 2017  HLD (hyperlipidemia)  Pulmonary HTN  Type 2 diabetes mellitus without complication, with long-term current use of insulin  DM (diabetes mellitus)  CAD (coronary artery disease)  GERD (gastroesophageal reflux disease)  Insomnia  CHF (congestive heart failure)  HTN (hypertension)  Mood disorder  COPD (chronic obstructive pulmonary disease)  HTN (hypertension)  DM (diabetes mellitus)  CAD (coronary artery disease)  Bipolar disorder  CYNTHIA treated with BiPAP    No significant past surgical history      04-04 @ 07:01  -  04-05 @ 07:00  --------------------------------------------------------  IN: 0 mL / OUT: 400 mL / NET: -400 mL    PHYSICAL EXAM:    GENERAL: NAD  HEAD:  Atraumatic, Normocephalic  EYES: EOMI, PERRL, conjunctiva and sclera clear  ENMT: No tonsillar erythema, exudates, or enlargement; Moist mucous membranes, Good dentition, No lesions  NECK: Supple, No JVD, Normal thyroid  NERVOUS SYSTEM:  Alert & Oriented X3, Good concentration; Motor Strength 5/5 B/L upper and lower extremities;   CHEST/LUNG: Diminished breath sounds bilaterally on auscultation.No rales, rhonchi, wheezing, or rubs  HEART: Regular rate and rhythm; No murmurs, rubs, or gallops  ABDOMEN: BS(+),Soft, Nontender, Nondistended  EXTREMITIES:  2+ Peripheral Pulses, No clubbing, cyanosis, or edema  LYMPH: No lymphadenopathy noted  SKIN: warm, dry, acyanotic.        LABS:  CBC Full  -  ( 05 Apr 2025 07:45 )  WBC Count : 10.19 K/uL  RBC Count : 4.76 M/uL  Hemoglobin : 12.9 g/dL  Hematocrit : 39.7 %  Platelet Count - Automated : 162 K/uL  Mean Cell Volume : 83.4 fl  Mean Cell Hemoglobin : 27.1 pg  Mean Cell Hemoglobin Concentration : 32.5 g/dL  Auto Neutrophil # : x  Auto Lymphocyte # : x  Auto Monocyte # : x  Auto Eosinophil # : x  Auto Basophil # : x  Auto Neutrophil % : x  Auto Lymphocyte % : x  Auto Monocyte % : x  Auto Eosinophil % : x  Auto Basophil % : x    04-05    132[L]  |  98  |  20[H]  ----------------------------<  219[H]  3.7   |  29  |  0.67    Ca    7.9[L]      05 Apr 2025 07:45  Phos  3.5     04-05  Mg     1.9     04-05    TPro  6.9  /  Alb  2.6[L]  /  TBili  0.4  /  DBili  x   /  AST  25  /  ALT  41  /  AlkPhos  65  04-05      ---------  04-04    133[L]  |  97  |  16  ----------------------------<  205[H]  3.5   |  27  |  0.62    Ca    8.2[L]      04 Apr 2025 06:44  Phos  2.4     04-04  Mg     2.1     04-04        Urinalysis Basic - ( 04 Apr 2025 06:44 )    Color: x / Appearance: x / SG: x / pH: x  Gluc: 205 mg/dL / Ketone: x  / Bili: x / Urobili: x   Blood: x / Protein: x / Nitrite: x   Leuk Esterase: x / RBC: x / WBC x   Sq Epi: x / Non Sq Epi: x / Bacteria: x      [  ]  DVT Prophylaxis  [  ]   Abnormal Nutritional Status -  Malnutrition     Diet, Easy to Chew:   Consistent Carbohydrate Evening Snacks  Mildly Thick Liquids (MILDTHICKLIQS)  Supplement Feeding Modality:  Oral (04-04-25 @ 15:58) [Pending Verification By Attending]  Diet, Regular:   Consistent Carbohydrate Evening Snacks  DASH/TLC Sodium & Cholesterol Restricted  Easy to Chew (EASYTOCHEW)  Mildly Thick Liquids (MILDTHICKLIQS) (04-04-25 @ 09:51) [Active]      RADIOLOGY & ADDITIONAL STUDIES:  ***    < from: Xray Chest 1 View- PORTABLE-Urgent (Xray Chest 1 View- PORTABLE-Urgent .) (04.02.25 @ 10:15) >  COMPARISON STUDY: Earlier the same afternoon    Frontal expiratory view of the chest shows the heart to be similar in   size. Endotracheal tube reaches the lower trachea. Feeding tube reaches   the central stomach.    The lungs show atelectasis or infiltrate of the lower right lung with   similar prominence of the central pulmonary arteries and there is no   evidence of pneumothorax nor pleural effusion.    Chest one view 4/2/2025 9:54 AM  Compared to the prior study, there is progression of right base   infiltrate. No pneumothorax.    IMPRESSION:  Right base infiltrate. No pneumothorax.    < end of copied text >  < from: Xray Chest 1 View- PORTABLE-Urgent (Xray Chest 1 View- PORTABLE-Urgent .) (04.01.25 @ 18:14) >  HISTORY: NG tube placement    COMPARISON STUDY: Earlier the same afternoon    Frontal expiratory view of the chest shows the heart to be similar in   size. Endotracheal tube reaches the lower trachea. Feeding tube reaches   the central stomach.    The lungs show atelectasis or infiltrate of the lower right lung with   similar prominence of the central pulmonary arteries and there is no   evidence of pneumothorax nor pleural effusion.    Chest one view 4/2/2025 9:54 AM  Compared to the prior study, there is progression of right base   infiltrate. No pneumothorax.    IMPRESSION:  Right base infiltrate. No pneumothorax.    < end of copied text >  < from: Xray Chest 1 View- PORTABLE-Urgent (Xray Chest 1 View- PORTABLE-Urgent .) (04.01.25 @ 17:10) >    INDICATION: ICU monitoring.    FINDINGS: NG TUBE TIP IN RIGHT LOWER LOBE BRONCHUS.  ET TUBE TIP JUST ABOVE THE TRACHEAL BIFURCATION. RETRACT ET TUBE 3 CM.  Mild cardiomegaly.  Large RIGHT proximal pulmonary artery and/or hilar adenopathy.  LEFT hilum within normal limits.  Critical value  discussed with Dr. Jones, on 4/1/2025 at 5:31 AMwith   read back.  Hospital policies for critical values including read back  policy were followed.  The verbal communication of the critical value  supplements this written report.    < end of copied text >  < from: Xray Chest 1 View- PORTABLE-Routine (Xray Chest 1 View- PORTABLE-Routine in AM.) (03.31.25 @ 10:54) >    INDICATION: ICU monitoring.    FINDINGS: NG TUBE TIP IN RIGHT LOWER LOBE BRONCHUS.  ET TUBE TIP JUST ABOVE THE TRACHEAL BIFURCATION. RETRACT ET TUBE 3 CM.  Mild cardiomegaly.  Large RIGHT proximal pulmonary artery and/or hilar adenopathy.  LEFT hilum within normal limits.  Critical value  discussed with Dr. Jones, on 4/1/2025 at 5:31 AMwith   read back.  Hospital policies for critical values including read back  policy were followed.  The verbal communication of the critical value  supplements this written report.    < end of copied text >  < from: Xray Chest 1 View- PORTABLE-Urgent (Xray Chest 1 View- PORTABLE-Urgent .) (03.30.25 @ 21:44) >  IMPRESSION:    ET tube and enteric catheter are in satisfactory position.    Possible mild pulmonary edema.    Prominent right hilum may be related to projection or pulmonary arterial   hypertension. Consider follow-up CT to exclude underlying mass.    < end of copied text >  < from: Xray Chest 1 View- PORTABLE-Urgent (Xray Chest 1 View- PORTABLE-Urgent .) (03.30.25 @ 18:20) >  IMPRESSION:    Minimal right lower lobe atelectasis versus infiltrate.    ET tube tip overlies the distal trachea, 3.1 cm above the angela. Enteric   catheter tip belowthe diaphragm.    < end of copied text >  < from: Xray Chest 1 View- PORTABLE-Urgent (Xray Chest 1 View- PORTABLE-Urgent .) (03.30.25 @ 17:46) >  IMPRESSION:    Minimal right lower lobe atelectasis versus infiltrate.    ET tube and enteric catheter are in satisfactory position.    < end of copied text >  < from: Xray Chest 1 View- PORTABLE-Urgent (03.29.25 @ 12:04) >  FINDINGS:    Single frontal view of the chest demonstrates bilateral lower lobe   infiltrates/atelectasis. The cardiomediastinal silhouette is   unremarkable. No acute osseous abnormalities. Overlying EKG leads and   wires are noted. Enlarged pulmonary arteries.    IMPRESSION: Bilateral lower lobe infiltrates/atelectasis.    Goals of Care Discussion with Family/Proxy/Other   - see note from  04/04/25

## 2025-04-05 NOTE — SWALLOW BEDSIDE ASSESSMENT ADULT - SWALLOW EVAL: DIAGNOSIS
Patient presents with s/s of acute-on-chronic dysphagia c/b prolonged bolus manipulation, delayed posterior transport and suspected premature spillage prior to swallow initiation, resulting in immediate cough response with all trials of thin liquids. Patient also noted with reduced breath swallow coordination, requiring pausing throughout to regulate respiration. No overt s/s of aspiration appreciated on trials of Mildly thick and Minced and Moist. At this time, due to current respiratory status (though improving), acuity of illness, impaired/reduced mobility, history of multiple intubations and history of dysphagia, patient remains at increased risk for aspiration + subsequent pulmonary sequela. Patient will benefit from a modified consistency diet and implementation of swallow precautions to facilitate safe oral intake.

## 2025-04-05 NOTE — SWALLOW BEDSIDE ASSESSMENT ADULT - PHARYNGEAL PHASE
Delayed pharyngeal swallow/Cough post oral intake No overt s/s of aspiration/Delayed pharyngeal swallow

## 2025-04-06 LAB
ALBUMIN SERPL ELPH-MCNC: 2.9 G/DL — LOW (ref 3.5–5)
ALP SERPL-CCNC: 66 U/L — SIGNIFICANT CHANGE UP (ref 40–120)
ALT FLD-CCNC: 43 U/L DA — SIGNIFICANT CHANGE UP (ref 10–60)
ANION GAP SERPL CALC-SCNC: 4 MMOL/L — LOW (ref 5–17)
AST SERPL-CCNC: 19 U/L — SIGNIFICANT CHANGE UP (ref 10–40)
BILIRUB SERPL-MCNC: 0.4 MG/DL — SIGNIFICANT CHANGE UP (ref 0.2–1.2)
BUN SERPL-MCNC: 18 MG/DL — SIGNIFICANT CHANGE UP (ref 7–18)
CALCIUM SERPL-MCNC: 8.6 MG/DL — SIGNIFICANT CHANGE UP (ref 8.4–10.5)
CHLORIDE SERPL-SCNC: 102 MMOL/L — SIGNIFICANT CHANGE UP (ref 96–108)
CO2 SERPL-SCNC: 30 MMOL/L — SIGNIFICANT CHANGE UP (ref 22–31)
CREAT SERPL-MCNC: 0.73 MG/DL — SIGNIFICANT CHANGE UP (ref 0.5–1.3)
EGFR: 103 ML/MIN/1.73M2 — SIGNIFICANT CHANGE UP
EGFR: 103 ML/MIN/1.73M2 — SIGNIFICANT CHANGE UP
GLUCOSE BLDC GLUCOMTR-MCNC: 180 MG/DL — HIGH (ref 70–99)
GLUCOSE BLDC GLUCOMTR-MCNC: 363 MG/DL — HIGH (ref 70–99)
GLUCOSE BLDC GLUCOMTR-MCNC: 375 MG/DL — HIGH (ref 70–99)
GLUCOSE BLDC GLUCOMTR-MCNC: 448 MG/DL — HIGH (ref 70–99)
GLUCOSE BLDC GLUCOMTR-MCNC: 470 MG/DL — CRITICAL HIGH (ref 70–99)
GLUCOSE SERPL-MCNC: 178 MG/DL — HIGH (ref 70–99)
HCT VFR BLD CALC: 39.6 % — SIGNIFICANT CHANGE UP (ref 39–50)
HGB BLD-MCNC: 12.7 G/DL — LOW (ref 13–17)
MAGNESIUM SERPL-MCNC: 2.2 MG/DL — SIGNIFICANT CHANGE UP (ref 1.6–2.6)
MCHC RBC-ENTMCNC: 27 PG — SIGNIFICANT CHANGE UP (ref 27–34)
MCHC RBC-ENTMCNC: 32.1 G/DL — SIGNIFICANT CHANGE UP (ref 32–36)
MCV RBC AUTO: 84.3 FL — SIGNIFICANT CHANGE UP (ref 80–100)
NRBC BLD AUTO-RTO: 0 /100 WBCS — SIGNIFICANT CHANGE UP (ref 0–0)
PHOSPHATE SERPL-MCNC: 3.1 MG/DL — SIGNIFICANT CHANGE UP (ref 2.5–4.5)
PLATELET # BLD AUTO: 197 K/UL — SIGNIFICANT CHANGE UP (ref 150–400)
POTASSIUM SERPL-MCNC: 3.7 MMOL/L — SIGNIFICANT CHANGE UP (ref 3.5–5.3)
POTASSIUM SERPL-SCNC: 3.7 MMOL/L — SIGNIFICANT CHANGE UP (ref 3.5–5.3)
PROT SERPL-MCNC: 6.9 G/DL — SIGNIFICANT CHANGE UP (ref 6–8.3)
RBC # BLD: 4.7 M/UL — SIGNIFICANT CHANGE UP (ref 4.2–5.8)
RBC # FLD: 13.8 % — SIGNIFICANT CHANGE UP (ref 10.3–14.5)
SODIUM SERPL-SCNC: 136 MMOL/L — SIGNIFICANT CHANGE UP (ref 135–145)
WBC # BLD: 12.75 K/UL — HIGH (ref 3.8–10.5)
WBC # FLD AUTO: 12.75 K/UL — HIGH (ref 3.8–10.5)

## 2025-04-06 RX ORDER — INSULIN LISPRO 100 U/ML
5 INJECTION, SOLUTION INTRAVENOUS; SUBCUTANEOUS ONCE
Refills: 0 | Status: COMPLETED | OUTPATIENT
Start: 2025-04-06 | End: 2025-04-06

## 2025-04-06 RX ORDER — INSULIN LISPRO 100 U/ML
6 INJECTION, SOLUTION INTRAVENOUS; SUBCUTANEOUS ONCE
Refills: 0 | Status: COMPLETED | OUTPATIENT
Start: 2025-04-06 | End: 2025-04-06

## 2025-04-06 RX ADMIN — Medication 1000 MILLIGRAM(S): at 06:00

## 2025-04-06 RX ADMIN — ENOXAPARIN SODIUM 40 MILLIGRAM(S): 100 INJECTION SUBCUTANEOUS at 21:16

## 2025-04-06 RX ADMIN — Medication 200 MILLIGRAM(S): at 11:35

## 2025-04-06 RX ADMIN — FUROSEMIDE 40 MILLIGRAM(S): 10 INJECTION INTRAMUSCULAR; INTRAVENOUS at 06:00

## 2025-04-06 RX ADMIN — IPRATROPIUM BROMIDE AND ALBUTEROL SULFATE 3 MILLILITER(S): .5; 2.5 SOLUTION RESPIRATORY (INHALATION) at 20:03

## 2025-04-06 RX ADMIN — Medication 1000 MILLIGRAM(S): at 15:00

## 2025-04-06 RX ADMIN — OXYCODONE HYDROCHLORIDE 5 MILLIGRAM(S): 30 TABLET ORAL at 21:45

## 2025-04-06 RX ADMIN — OXYCODONE HYDROCHLORIDE 5 MILLIGRAM(S): 30 TABLET ORAL at 12:25

## 2025-04-06 RX ADMIN — OXYCODONE HYDROCHLORIDE 5 MILLIGRAM(S): 30 TABLET ORAL at 09:00

## 2025-04-06 RX ADMIN — FINASTERIDE 5 MILLIGRAM(S): 1 TABLET, FILM COATED ORAL at 11:34

## 2025-04-06 RX ADMIN — Medication 40 MILLIGRAM(S): at 06:08

## 2025-04-06 RX ADMIN — Medication 1 DROP(S): at 06:01

## 2025-04-06 RX ADMIN — Medication 1000 MILLIGRAM(S): at 21:15

## 2025-04-06 RX ADMIN — Medication 46 UNIT(S): at 21:21

## 2025-04-06 RX ADMIN — Medication 1000 MILLIGRAM(S): at 07:01

## 2025-04-06 RX ADMIN — INSULIN LISPRO 5 UNIT(S): 100 INJECTION, SOLUTION INTRAVENOUS; SUBCUTANEOUS at 17:21

## 2025-04-06 RX ADMIN — IPRATROPIUM BROMIDE AND ALBUTEROL SULFATE 3 MILLILITER(S): .5; 2.5 SOLUTION RESPIRATORY (INHALATION) at 09:08

## 2025-04-06 RX ADMIN — INSULIN LISPRO 6: 100 INJECTION, SOLUTION INTRAVENOUS; SUBCUTANEOUS at 12:06

## 2025-04-06 RX ADMIN — OXYCODONE HYDROCHLORIDE 5 MILLIGRAM(S): 30 TABLET ORAL at 08:17

## 2025-04-06 RX ADMIN — Medication 81 MILLIGRAM(S): at 11:34

## 2025-04-06 RX ADMIN — ATORVASTATIN CALCIUM 20 MILLIGRAM(S): 80 TABLET, FILM COATED ORAL at 21:21

## 2025-04-06 RX ADMIN — Medication 1000 MILLIGRAM(S): at 05:10

## 2025-04-06 RX ADMIN — OXYCODONE HYDROCHLORIDE 5 MILLIGRAM(S): 30 TABLET ORAL at 16:46

## 2025-04-06 RX ADMIN — Medication 1000 MILLIGRAM(S): at 14:11

## 2025-04-06 RX ADMIN — INSULIN LISPRO 1: 100 INJECTION, SOLUTION INTRAVENOUS; SUBCUTANEOUS at 08:19

## 2025-04-06 RX ADMIN — OXYCODONE HYDROCHLORIDE 5 MILLIGRAM(S): 30 TABLET ORAL at 07:01

## 2025-04-06 RX ADMIN — Medication 0.25 MILLIGRAM(S): at 21:16

## 2025-04-06 RX ADMIN — INSULIN LISPRO 5: 100 INJECTION, SOLUTION INTRAVENOUS; SUBCUTANEOUS at 17:08

## 2025-04-06 RX ADMIN — Medication 150 MILLIGRAM(S): at 12:25

## 2025-04-06 RX ADMIN — Medication 0.5 MILLIGRAM(S): at 21:15

## 2025-04-06 RX ADMIN — INSULIN LISPRO 3: 100 INJECTION, SOLUTION INTRAVENOUS; SUBCUTANEOUS at 21:21

## 2025-04-06 RX ADMIN — LATANOPROST PF 1 DROP(S): 0.05 SOLUTION/ DROPS OPHTHALMIC at 21:17

## 2025-04-06 RX ADMIN — Medication 1 TABLET(S): at 06:01

## 2025-04-06 RX ADMIN — OXYCODONE HYDROCHLORIDE 5 MILLIGRAM(S): 30 TABLET ORAL at 17:40

## 2025-04-06 RX ADMIN — OXYCODONE HYDROCHLORIDE 5 MILLIGRAM(S): 30 TABLET ORAL at 21:15

## 2025-04-06 RX ADMIN — METHYLPREDNISOLONE ACETATE 40 MILLIGRAM(S): 80 INJECTION, SUSPENSION INTRA-ARTICULAR; INTRALESIONAL; INTRAMUSCULAR; SOFT TISSUE at 06:01

## 2025-04-06 RX ADMIN — OXYCODONE HYDROCHLORIDE 5 MILLIGRAM(S): 30 TABLET ORAL at 13:00

## 2025-04-06 RX ADMIN — INSULIN LISPRO 6 UNIT(S): 100 INJECTION, SOLUTION INTRAVENOUS; SUBCUTANEOUS at 12:06

## 2025-04-06 RX ADMIN — Medication 250 MILLIGRAM(S): at 11:34

## 2025-04-06 RX ADMIN — Medication 1 TABLET(S): at 17:07

## 2025-04-06 RX ADMIN — TAMSULOSIN HYDROCHLORIDE 0.4 MILLIGRAM(S): 0.4 CAPSULE ORAL at 21:16

## 2025-04-06 RX ADMIN — Medication 58 UNIT(S): at 08:20

## 2025-04-06 RX ADMIN — Medication 1000 MILLIGRAM(S): at 21:45

## 2025-04-06 NOTE — PROGRESS NOTE ADULT - SUBJECTIVE AND OBJECTIVE BOX
DEVIKA CARBALLO    SCU progress note    INTERVAL HPI/OVERNIGHT EVENTS: ***    DNR [ ]   DNI  [  ]       FULL CODE [   ]    Covid - 19 PCR:     The 4Ms    What Matters Most: see GOC  Age appropriate Medications/Screen for High Risk Medication: Yes  Mentation: see CAM below  Mobility: defer to physical exam    The Confusion Assessment Method (CAM) Diagnostic Algorithm     1: Acute Onset or Fluctuating Course  - Is there evidence of an acute change in mental status from the patient’s baseline? Did the (abnormal) behavior  fluctuate during the day, that is, tend to come and go, or increase and decrease in severity?  [ ] YES [ ] NO    [  ]  UNABLE TO ASSESS      2: Inattention  - Did the patient have difficulty focusing attention, being easily distractible, or having difficulty keeping track of what was being said?  [ ] YES [ ] NO      [  ]  UNABLE TO ASSESS      3: Disorganized thinking  -Was the patient’s thinking disorganized or incoherent, such as rambling or irrelevant conversation, unclear or illogical flow of ideas, or unpredictable switching from subject to subject?  [ ] YES [ ] NO      [  ]  UNABLE TO ASSESS     4: Altered Level of consciousness?  [ ] YES [ ] NO    The diagnosis of delirium by CAM requires the presence of features 1 and 2 and either 3 or 4.    PRESSORS: [ ] YES [ ] NO  azithromycin  IVPB      azithromycin  IVPB 500 milliGRAM(s) IV Intermittent every 24 hours  fluconAZOLE   Tablet 200 milliGRAM(s) Oral daily    Cardiovascular:  Heart Failure  Acute   Acute on Chronic  Chronic       furosemide    Tablet 40 milliGRAM(s) Oral daily    Pulmonary:  albuterol/ipratropium for Nebulization 3 milliLiter(s) Nebulizer every 6 hours PRN  fluticasone propionate/ salmeterol 250-50 MICROgram(s) Diskus 1 Dose(s) Inhalation two times a day  tiotropium 2.5 MICROgram(s) Inhaler 2 Puff(s) Inhalation daily    Hematologic:  aspirin  chewable 81 milliGRAM(s) Oral daily  enoxaparin Injectable 40 milliGRAM(s) SubCutaneous every 24 hours    Other:  acetaminophen     Tablet .. 1000 milliGRAM(s) Oral every 8 hours  ALPRAZolam 0.5 milliGRAM(s) Oral three times a day PRN  artificial  tears Solution 1 Drop(s) Both EYES two times a day  atorvastatin 20 milliGRAM(s) Oral at bedtime  finasteride 5 milliGRAM(s) Oral daily  insulin lispro (ADMELOG) corrective regimen sliding scale   SubCutaneous three times a day before meals  insulin lispro (ADMELOG) corrective regimen sliding scale   SubCutaneous at bedtime  insulin NPH human recombinant 46 Unit(s) SubCutaneous at bedtime  insulin NPH human recombinant 58 Unit(s) SubCutaneous before breakfast  latanoprost 0.005% Ophthalmic Solution 1 Drop(s) Both EYES at bedtime  lidocaine   4% Patch 1 Patch Transdermal daily  methylPREDNISolone sodium succinate Injectable 40 milliGRAM(s) IV Push daily  nicotine -  14 mG/24Hr(s) Patch 1 Patch Transdermal daily  oxyCODONE    IR 2.5 milliGRAM(s) Oral every 4 hours PRN  oxyCODONE    IR 5 milliGRAM(s) Oral every 4 hours PRN  pantoprazole    Tablet 40 milliGRAM(s) Oral before breakfast  polyethylene glycol 3350 17 Gram(s) Oral daily  potassium phosphate / sodium phosphate Tablet (K-PHOS No. 2) 1 Tablet(s) Oral two times a day  risperiDONE   Tablet 0.25 milliGRAM(s) Oral at bedtime  senna 2 Tablet(s) Oral at bedtime  tamsulosin 0.4 milliGRAM(s) Oral at bedtime  traZODone 150 milliGRAM(s) Oral daily    acetaminophen     Tablet .. 1000 milliGRAM(s) Oral every 8 hours  albuterol/ipratropium for Nebulization 3 milliLiter(s) Nebulizer every 6 hours PRN  ALPRAZolam 0.5 milliGRAM(s) Oral three times a day PRN  artificial  tears Solution 1 Drop(s) Both EYES two times a day  aspirin  chewable 81 milliGRAM(s) Oral daily  atorvastatin 20 milliGRAM(s) Oral at bedtime  azithromycin  IVPB      azithromycin  IVPB 500 milliGRAM(s) IV Intermittent every 24 hours  enoxaparin Injectable 40 milliGRAM(s) SubCutaneous every 24 hours  finasteride 5 milliGRAM(s) Oral daily  fluconAZOLE   Tablet 200 milliGRAM(s) Oral daily  fluticasone propionate/ salmeterol 250-50 MICROgram(s) Diskus 1 Dose(s) Inhalation two times a day  furosemide    Tablet 40 milliGRAM(s) Oral daily  insulin lispro (ADMELOG) corrective regimen sliding scale   SubCutaneous three times a day before meals  insulin lispro (ADMELOG) corrective regimen sliding scale   SubCutaneous at bedtime  insulin NPH human recombinant 46 Unit(s) SubCutaneous at bedtime  insulin NPH human recombinant 58 Unit(s) SubCutaneous before breakfast  latanoprost 0.005% Ophthalmic Solution 1 Drop(s) Both EYES at bedtime  lidocaine   4% Patch 1 Patch Transdermal daily  methylPREDNISolone sodium succinate Injectable 40 milliGRAM(s) IV Push daily  nicotine -  14 mG/24Hr(s) Patch 1 Patch Transdermal daily  oxyCODONE    IR 2.5 milliGRAM(s) Oral every 4 hours PRN  oxyCODONE    IR 5 milliGRAM(s) Oral every 4 hours PRN  pantoprazole    Tablet 40 milliGRAM(s) Oral before breakfast  polyethylene glycol 3350 17 Gram(s) Oral daily  potassium phosphate / sodium phosphate Tablet (K-PHOS No. 2) 1 Tablet(s) Oral two times a day  risperiDONE   Tablet 0.25 milliGRAM(s) Oral at bedtime  senna 2 Tablet(s) Oral at bedtime  tamsulosin 0.4 milliGRAM(s) Oral at bedtime  tiotropium 2.5 MICROgram(s) Inhaler 2 Puff(s) Inhalation daily  traZODone 150 milliGRAM(s) Oral daily    Drug Dosing Weight  Height (cm): 172.7 (29 Mar 2025 16:46)  Weight (kg): 96.3 (29 Mar 2025 16:46)  BMI (kg/m2): 32.3 (29 Mar 2025 16:46)  BSA (m2): 2.1 (29 Mar 2025 16:46)    CENTRAL LINE: [ ] YES [ ] NO  LOCATION:   DATE INSERTED:  REMOVE: [ ] YES [ ] NO  EXPLAIN:    MT: [ ] YES [ ] NO    DATE INSERTED:  REMOVE:  [ ] YES [ ] NO  EXPLAIN:    PAST MEDICAL & SURGICAL HISTORY:  COPD (chronic obstructive pulmonary disease)  Oxygen 2-3L at home      Stented coronary artery  2017      HLD (hyperlipidemia)      Pulmonary HTN      Type 2 diabetes mellitus without complication, with long-term current use of insulin      DM (diabetes mellitus)      CAD (coronary artery disease)      GERD (gastroesophageal reflux disease)      Insomnia      CHF (congestive heart failure)      HTN (hypertension)      Mood disorder      COPD (chronic obstructive pulmonary disease)      HTN (hypertension)      DM (diabetes mellitus)      CAD (coronary artery disease)      Bipolar disorder      CYNTHIA treated with BiPAP      No significant past surgical history                  04-05 @ 07:01  -  04-06 @ 07:00  --------------------------------------------------------  IN: 0 mL / OUT: 1100 mL / NET: -1100 mL            PHYSICAL EXAM:        LABS:  CBC Full  -  ( 06 Apr 2025 06:34 )  WBC Count : 12.75 K/uL  RBC Count : 4.70 M/uL  Hemoglobin : 12.7 g/dL  Hematocrit : 39.6 %  Platelet Count - Automated : 197 K/uL  Mean Cell Volume : 84.3 fl  Mean Cell Hemoglobin : 27.0 pg  Mean Cell Hemoglobin Concentration : 32.1 g/dL  Auto Neutrophil # : x  Auto Lymphocyte # : x  Auto Monocyte # : x  Auto Eosinophil # : x  Auto Basophil # : x  Auto Neutrophil % : x  Auto Lymphocyte % : x  Auto Monocyte % : x  Auto Eosinophil % : x  Auto Basophil % : x    04-06    136  |  102  |  18  ----------------------------<  178[H]  3.7   |  30  |  0.73    Ca    8.6      06 Apr 2025 06:34  Phos  3.1     04-06  Mg     2.2     04-06    TPro  6.9  /  Alb  2.9[L]  /  TBili  0.4  /  DBili  x   /  AST  19  /  ALT  43  /  AlkPhos  66  04-06      Urinalysis Basic - ( 06 Apr 2025 06:34 )    Color: x / Appearance: x / SG: x / pH: x  Gluc: 178 mg/dL / Ketone: x  / Bili: x / Urobili: x   Blood: x / Protein: x / Nitrite: x   Leuk Esterase: x / RBC: x / WBC x   Sq Epi: x / Non Sq Epi: x / Bacteria: x            [  ]  DVT Prophylaxis  [  ]   Abnormal Nutritional Status -  Malnutrition   Cachexia      Morbid Obesity BMI >/=40  Diet, Regular:   Consistent Carbohydrate Evening Snacks  DASH/TLC Sodium & Cholesterol Restricted  Minced and Moist (MINCEDMOIST)  Mildly Thick Liquids (MILDTHICKLIQS) (04-05-25 @ 13:16) [Active]  Diet, Easy to Chew:   Consistent Carbohydrate Evening Snacks  Mildly Thick Liquids (MILDTHICKLIQS)  Supplement Feeding Modality:  Oral (04-04-25 @ 15:58) [Pending Verification By Attending]          RADIOLOGY & ADDITIONAL STUDIES:  ***    Goals of Care Discussion with Family/Proxy/Other   - see note from     DEVIKA CARBALLO    SCU progress note    INTERVAL HPI/OVERNIGHT EVENTS: ***No acute events overnight.   Pt seen and examined this morning. Pt sitting up in bed, awake/alert, endorses he did not use BIPAP overnight, and he did not refuse it. Pt endorses chronic back pain 8/10. Pt denies numbness/tingling to BLE, constipation.       FULL CODE [  x ]    Covid - 19 PCR:     The 4Ms    What Matters Most: see GOC  Age appropriate Medications/Screen for High Risk Medication: Yes  Mentation: see CAM below  Mobility: defer to physical exam    The Confusion Assessment Method (CAM) Diagnostic Algorithm     1: Acute Onset or Fluctuating Course  - Is there evidence of an acute change in mental status from the patient’s baseline? Did the (abnormal) behavior  fluctuate during the day, that is, tend to come and go, or increase and decrease in severity?  [ ] YES [ x] NO    [  ]  UNABLE TO ASSESS      2: Inattention  - Did the patient have difficulty focusing attention, being easily distractible, or having difficulty keeping track of what was being said?  [ ] YES [x ] NO      [  ]  UNABLE TO ASSESS      3: Disorganized thinking  -Was the patient’s thinking disorganized or incoherent, such as rambling or irrelevant conversation, unclear or illogical flow of ideas, or unpredictable switching from subject to subject?  [ ] YES [x ] NO      [  ]  UNABLE TO ASSESS     4: Altered Level of consciousness?  [ ] YES [x ] NO    The diagnosis of delirium by CAM requires the presence of features 1 and 2 and either 3 or 4.    PRESSORS: [ ] YES [x ] NO  azithromycin  IVPB      azithromycin  IVPB 500 milliGRAM(s) IV Intermittent every 24 hours  fluconAZOLE   Tablet 200 milliGRAM(s) Oral daily    Cardiovascular:  Heart Failure  Acute   Acute on Chronic  Chronic       furosemide    Tablet 40 milliGRAM(s) Oral daily    Pulmonary:  albuterol/ipratropium for Nebulization 3 milliLiter(s) Nebulizer every 6 hours PRN  fluticasone propionate/ salmeterol 250-50 MICROgram(s) Diskus 1 Dose(s) Inhalation two times a day  tiotropium 2.5 MICROgram(s) Inhaler 2 Puff(s) Inhalation daily    Hematologic:  aspirin  chewable 81 milliGRAM(s) Oral daily  enoxaparin Injectable 40 milliGRAM(s) SubCutaneous every 24 hours    Other:  acetaminophen     Tablet .. 1000 milliGRAM(s) Oral every 8 hours  ALPRAZolam 0.5 milliGRAM(s) Oral three times a day PRN  artificial  tears Solution 1 Drop(s) Both EYES two times a day  atorvastatin 20 milliGRAM(s) Oral at bedtime  finasteride 5 milliGRAM(s) Oral daily  insulin lispro (ADMELOG) corrective regimen sliding scale   SubCutaneous three times a day before meals  insulin lispro (ADMELOG) corrective regimen sliding scale   SubCutaneous at bedtime  insulin NPH human recombinant 46 Unit(s) SubCutaneous at bedtime  insulin NPH human recombinant 58 Unit(s) SubCutaneous before breakfast  latanoprost 0.005% Ophthalmic Solution 1 Drop(s) Both EYES at bedtime  lidocaine   4% Patch 1 Patch Transdermal daily  methylPREDNISolone sodium succinate Injectable 40 milliGRAM(s) IV Push daily  nicotine -  14 mG/24Hr(s) Patch 1 Patch Transdermal daily  oxyCODONE    IR 2.5 milliGRAM(s) Oral every 4 hours PRN  oxyCODONE    IR 5 milliGRAM(s) Oral every 4 hours PRN  pantoprazole    Tablet 40 milliGRAM(s) Oral before breakfast  polyethylene glycol 3350 17 Gram(s) Oral daily  potassium phosphate / sodium phosphate Tablet (K-PHOS No. 2) 1 Tablet(s) Oral two times a day  risperiDONE   Tablet 0.25 milliGRAM(s) Oral at bedtime  senna 2 Tablet(s) Oral at bedtime  tamsulosin 0.4 milliGRAM(s) Oral at bedtime  traZODone 150 milliGRAM(s) Oral daily    acetaminophen     Tablet .. 1000 milliGRAM(s) Oral every 8 hours  albuterol/ipratropium for Nebulization 3 milliLiter(s) Nebulizer every 6 hours PRN  ALPRAZolam 0.5 milliGRAM(s) Oral three times a day PRN  artificial  tears Solution 1 Drop(s) Both EYES two times a day  aspirin  chewable 81 milliGRAM(s) Oral daily  atorvastatin 20 milliGRAM(s) Oral at bedtime  azithromycin  IVPB      azithromycin  IVPB 500 milliGRAM(s) IV Intermittent every 24 hours  enoxaparin Injectable 40 milliGRAM(s) SubCutaneous every 24 hours  finasteride 5 milliGRAM(s) Oral daily  fluconAZOLE   Tablet 200 milliGRAM(s) Oral daily  fluticasone propionate/ salmeterol 250-50 MICROgram(s) Diskus 1 Dose(s) Inhalation two times a day  furosemide    Tablet 40 milliGRAM(s) Oral daily  insulin lispro (ADMELOG) corrective regimen sliding scale   SubCutaneous three times a day before meals  insulin lispro (ADMELOG) corrective regimen sliding scale   SubCutaneous at bedtime  insulin NPH human recombinant 46 Unit(s) SubCutaneous at bedtime  insulin NPH human recombinant 58 Unit(s) SubCutaneous before breakfast  latanoprost 0.005% Ophthalmic Solution 1 Drop(s) Both EYES at bedtime  lidocaine   4% Patch 1 Patch Transdermal daily  methylPREDNISolone sodium succinate Injectable 40 milliGRAM(s) IV Push daily  nicotine -  14 mG/24Hr(s) Patch 1 Patch Transdermal daily  oxyCODONE    IR 2.5 milliGRAM(s) Oral every 4 hours PRN  oxyCODONE    IR 5 milliGRAM(s) Oral every 4 hours PRN  pantoprazole    Tablet 40 milliGRAM(s) Oral before breakfast  polyethylene glycol 3350 17 Gram(s) Oral daily  potassium phosphate / sodium phosphate Tablet (K-PHOS No. 2) 1 Tablet(s) Oral two times a day  risperiDONE   Tablet 0.25 milliGRAM(s) Oral at bedtime  senna 2 Tablet(s) Oral at bedtime  tamsulosin 0.4 milliGRAM(s) Oral at bedtime  tiotropium 2.5 MICROgram(s) Inhaler 2 Puff(s) Inhalation daily  traZODone 150 milliGRAM(s) Oral daily    Drug Dosing Weight  Height (cm): 172.7 (29 Mar 2025 16:46)  Weight (kg): 96.3 (29 Mar 2025 16:46)  BMI (kg/m2): 32.3 (29 Mar 2025 16:46)  BSA (m2): 2.1 (29 Mar 2025 16:46)    CENTRAL LINE: [ ] YES [x ] NO  LOCATION:   DATE INSERTED:  REMOVE: [ ] YES [ ] NO  EXPLAIN:    AMOR: [x ] YES [ ] NO    DATE INSERTED:4/5/25  REMOVE:  [ ] YES [ x] NO  EXPLAIN:Failed TOV    PAST MEDICAL & SURGICAL HISTORY:  COPD (chronic obstructive pulmonary disease)  Oxygen 2-3L at home  Stented coronary artery 2017  HLD (hyperlipidemia)  Pulmonary HTN  Type 2 diabetes mellitus without complication, with long-term current use of insulin  DM (diabetes mellitus)  CAD (coronary artery disease)  GERD (gastroesophageal reflux disease)  Insomnia  CHF (congestive heart failure)  HTN (hypertension)  Mood disorder  COPD (chronic obstructive pulmonary disease)  HTN (hypertension)  DM (diabetes mellitus)  CAD (coronary artery disease)  Bipolar disorder  CYNTHIA treated with BiPAP    No significant past surgical history        04-05 @ 07:01  -  04-06 @ 07:00  --------------------------------------------------------  IN: 0 mL / OUT: 1100 mL / NET: -1100 mL      PHYSICAL EXAM:    GENERAL: NAD  HEAD:  Atraumatic, Normocephalic  EYES: EOMI, PERRL, conjunctiva and sclera clear  ENMT: No tonsillar erythema, exudates, or enlargement; Moist mucous membranes, Good dentition, No lesions  NECK: Supple, No JVD, Normal thyroid  NERVOUS SYSTEM:  Alert & Oriented X3, Good concentration; Motor Strength 5/5 B/L upper and lower extremities;   CHEST/LUNG: Clear upper lung fields, decreased to bases on  auscultation bilaterally; No rales, rhonchi, wheezing, or rubs  HEART: Regular rate and rhythm; No murmurs, rubs, or gallops  ABDOMEN: BS(+),Soft, Nontender, Nondistended  EXTREMITIES:  2+ Peripheral Pulses, No clubbing, cyanosis, or edema  LYMPH: No lymphadenopathy noted  SKIN: warm, dry, acyanotic.        LABS:  CBC Full  -  ( 06 Apr 2025 06:34 )  WBC Count : 12.75 K/uL  RBC Count : 4.70 M/uL  Hemoglobin : 12.7 g/dL  Hematocrit : 39.6 %  Platelet Count - Automated : 197 K/uL  Mean Cell Volume : 84.3 fl  Mean Cell Hemoglobin : 27.0 pg  Mean Cell Hemoglobin Concentration : 32.1 g/dL  Auto Neutrophil # : x  Auto Lymphocyte # : x  Auto Monocyte # : x  Auto Eosinophil # : x  Auto Basophil # : x  Auto Neutrophil % : x  Auto Lymphocyte % : x  Auto Monocyte % : x  Auto Eosinophil % : x  Auto Basophil % : x    04-06    136  |  102  |  18  ----------------------------<  178[H]  3.7   |  30  |  0.73    Ca    8.6      06 Apr 2025 06:34  Phos  3.1     04-06  Mg     2.2     04-06    TPro  6.9  /  Alb  2.9[L]  /  TBili  0.4  /  DBili  x   /  AST  19  /  ALT  43  /  AlkPhos  66  04-06      Urinalysis Basic - ( 06 Apr 2025 06:34 )    Color: x / Appearance: x / SG: x / pH: x  Gluc: 178 mg/dL / Ketone: x  / Bili: x / Urobili: x   Blood: x / Protein: x / Nitrite: x   Leuk Esterase: x / RBC: x / WBC x   Sq Epi: x / Non Sq Epi: x / Bacteria: x      [  ]  DVT Prophylaxis  [  ]   Abnormal Nutritional Status -  Malnutrition   Cachexia      Morbid Obesity BMI >/=40  Diet, Regular:   Consistent Carbohydrate Evening Snacks  DASH/TLC Sodium & Cholesterol Restricted  Minced and Moist (MINCEDMOIST)  Mildly Thick Liquids (MILDTHICKLIQS) (04-05-25 @ 13:16) [Active]  Diet, Easy to Chew:   Consistent Carbohydrate Evening Snacks  Mildly Thick Liquids (MILDTHICKLIQS)  Supplement Feeding Modality:  Oral (04-04-25 @ 15:58) [Pending Verification By Attending]        RADIOLOGY & ADDITIONAL STUDIES:  ***    < from: Xray Chest 1 View- PORTABLE-Urgent (Xray Chest 1 View- PORTABLE-Urgent .) (04.02.25 @ 10:15) >  COMPARISON STUDY: Earlier the same afternoon    Frontal expiratory view of the chest shows the heart to be similar in   size. Endotracheal tube reaches the lower trachea. Feeding tube reaches   the central stomach.    The lungs show atelectasis or infiltrate of the lower right lung with   similar prominence of the central pulmonary arteries and there is no   evidence of pneumothorax nor pleural effusion.    Chest one view 4/2/2025 9:54 AM  Compared to the prior study, there is progression of right base   infiltrate. No pneumothorax.    IMPRESSION:  Right base infiltrate. No pneumothorax.    < end of copied text >  < from: Xray Chest 1 View- PORTABLE-Urgent (Xray Chest 1 View- PORTABLE-Urgent .) (04.01.25 @ 18:14) >  IMPRESSION:  Right base infiltrate. No pneumothorax.    < end of copied text >  < from: Xray Chest 1 View- PORTABLE-Routine (Xray Chest 1 View- PORTABLE-Routine in AM.) (03.31.25 @ 10:54) >  INDICATION: ICU monitoring.    FINDINGS: NG TUBE TIP IN RIGHT LOWER LOBE BRONCHUS.  ET TUBE TIP JUST ABOVE THE TRACHEAL BIFURCATION. RETRACT ET TUBE 3 CM.  Mild cardiomegaly.  Large RIGHT proximal pulmonary artery and/or hilar adenopathy.  LEFT hilum within normal limits.  Critical value  discussed with Dr. Jones, on 4/1/2025 at 5:31 AMwith   read back.  Hospital policies for critical values including read back  policy were followed.  The verbal communication of the critical value  supplements this written report.    < end of copied text >  < from: Xray Chest 1 View- PORTABLE-Urgent (Xray Chest 1 View- PORTABLE-Urgent .) (03.30.25 @ 21:44) >  INTERPRETATION:  CLINICAL INFORMATION: 64 years  Male with ICU monitoring.    COMPARISON: 3/30/2025 at 6:04 PM    ET tube tip overlies the mid trachea. The enteric tube extends below the   diaphragm. The tip is not included in the image.    AP view of the chest demonstrates mild pulmonary vascular congestion and   right peribronchial thickening suggestive of mild pulmonary edema. The   left lung is clear. There is no pleural effusion. The pulmonary   vasculature is normal. There is no pneumothorax.    The heart is normal in size. The right hilum is prominent. There is no   mediastinal or left hilar mass.    Mild thoracic degenerative changes are present.    IMPRESSION:    ET tube and enteric catheter are in satisfactory position.    Possible mild pulmonary edema.    Prominent right hilum may be related to projection or pulmonary arterial   hypertension. Consider follow-up CT to exclude underlying mass.    < end of copied text >  < from: Xray Chest 1 View- PORTABLE-Urgent (Xray Chest 1 View- PORTABLE-Urgent .) (03.30.25 @ 18:20) >  IMPRESSION:    Minimal right lower lobe atelectasis versus infiltrate.    ET tube tip overlies the distal trachea, 3.1 cm above the angela. Enteric   catheter tip belowthe diaphragm.    < end of copied text >  < from: Xray Chest 1 View- PORTABLE-Urgent (Xray Chest 1 View- PORTABLE-Urgent .) (03.30.25 @ 17:46) >    COMPARISON: 3/29/2025    ET tube tip overlies the mid trachea. The enteric tube extends below the   diaphragm. The tip is not included in the image.    AP view of the chest demonstrates minimal right lower lobe atelectasis   versus infiltrate. The left lung is clear.. There is no pleural effusion.   The pulmonary vasculature is normal. There is no pneumothorax.    The heart is normal in size. The mediastinum and emre cannot be assessed.    Mild thoracic degenerative changes are present.    IMPRESSION:    Minimal right lower lobe atelectasis versus infiltrate.    ET tube and enteric catheter are in satisfactory position.    < end of copied text >  < from: Xray Chest 1 View- PORTABLE-Urgent (03.29.25 @ 12:04) >  CLINICAL HISTORY: Sepsis    FINDINGS:    Single frontal view of the chest demonstrates bilateral lower lobe   infiltrates/atelectasis. The cardiomediastinal silhouette is   unremarkable. No acute osseous abnormalities. Overlying EKG leads and   wires are noted. Enlarged pulmonary arteries.    IMPRESSION: Bilateral lower lobe infiltrates/atelectasis.    < end of copied text >      Goals of Care Discussion with Family/Proxy/Other   - see note from 4/4/25

## 2025-04-06 NOTE — PROGRESS NOTE ADULT - ASSESSMENT
64M from [facility], w/ PMHx of CAD, CHF, DM, CYNTHIA on nocturnal bipap, COPD (multiple intubations in past on 3L of home O2), peripheral neuropathy, GERD, HTN, HLD, obesity, polyarthritis, Pulmonary HTN, TIA, BPH, depression/anxiety p/w respiratory distress from NH. Placed on BiPAP, s/p lasix 80, solumedrol 125. Admitted to ICU for AHHRF 2/2 COPD, possible CHF exacerbation, aspiration pneumonia. Intubated for increased WOB and fever. TTE resulted poor read , positive  moderate pHTN. Treated with IV lasix, diamox, steroids. Successfully extubated to 2 L NC on 4/2. Patient started on his home nocturnal BiPAP. Patient declined BiPAP. Continued on home medications trazodone, alprazolam, risperidone, atorvastatin, finasteride. Patient downgraded to AI. Blood culture negative, sputum culture negative.  4/4: Patient complain of lower back pain, started acetaminophen 1G q8h x3days, lidocaine daily PRN oxycodone. Patient pending Speech and swallow evaluation for aspiration. Found to have Candidiasis UTI treating with Fluconazole 14 days.   4/5 Failed TOV. Indwelling mark cath placed.

## 2025-04-06 NOTE — PROGRESS NOTE ADULT - PROBLEM SELECTOR PLAN 12
DVT ppx: Heparin SC change to Lovenox   GI ppx: Add PPI as pt is on steroid    Pt from [facility] .   Dispo: Back to E[facility] when optimized.   CM following  Overall prognosis guarded.   Full Code
DVT ppx: Heparin SC change to Lovenox   GI ppx: PPI while on steroid    Pt from [facility] .   Dispo: Back to ECC when optimized.   CM following  Overall prognosis guarded.   Full Code.

## 2025-04-06 NOTE — PROGRESS NOTE ADULT - PROBLEM SELECTOR PLAN 1
due  to aspiration pneumonia , possible CHF exacerbation, End stage COPD  Continue BIPAP qhs and as needed. Settings increased : IPAP 16/EPAP 8 due to pt obese.   Continue Supplemental oxygen . Wean as tolerated to keep SpO2 >92%  Monitor oxygenation.   Continue Azithromycin, Ceftriaxone  Continue Advair, spiriva  Continue IV solumedrol 40mg daily. Transition to prednisone with taper starting  Monday.   Continue Lasix.

## 2025-04-06 NOTE — PROGRESS NOTE ADULT - PROBLEM SELECTOR PLAN 9
A1C 11.3  Episode of POCT glucose 440-470 pre-lunch today. Pt ate 2 trays of breakfast. Give additional 6 units of Admelog for total of 12 units.   Continue NPH 58 units qam, 46 units qHS  Continue glucose monitoring ac/HS and coverage with Admelog s/s. A1C 11.3  Episode of POCT glucose 440-470 pre-lunch today. Pt ate 2 trays of breakfast. Give additional 6 units of Admelog for total of 12 units.   Continue NPH 58 units qam, 46 units qHS  Continue glucose monitoring ac/HS and coverage with Admelog s/s.  Endo Consult A1C 11.3  Uncontrolled with peaks to 396 at bedtime and episode of POCT glucose 440-470 pre-lunch today. Pt ate 2 trays of breakfast. Give additional 6 units of Admelog for total of 12 units.   Continue NPH , increase to 69 units qam,  Continue  46 units qHS  Continue glucose monitoring ac/HS and coverage with Admelog s/s.  Endo Consult

## 2025-04-06 NOTE — PROGRESS NOTE ADULT - PROBLEM SELECTOR PLAN 11
CXR findings of Mild thoracic degenerative changes  Continue acetaminophen 1gm q8h x 3 days  Continue lidocaine patch.  Oxycodone prn   Bowel regimen to prevent opioid  induced constipation
CXR findings of Mild thoracic degenerative changes  Continue acetaminophen 1gm q8h x 3 days  Continue lidocaine patch

## 2025-04-07 DIAGNOSIS — J15.1 PNEUMONIA DUE TO PSEUDOMONAS: ICD-10-CM

## 2025-04-07 LAB
ALBUMIN SERPL ELPH-MCNC: 2.9 G/DL — LOW (ref 3.5–5)
ALP SERPL-CCNC: 56 U/L — SIGNIFICANT CHANGE UP (ref 40–120)
ALT FLD-CCNC: 38 U/L DA — SIGNIFICANT CHANGE UP (ref 10–60)
ANION GAP SERPL CALC-SCNC: 6 MMOL/L — SIGNIFICANT CHANGE UP (ref 5–17)
AST SERPL-CCNC: 17 U/L — SIGNIFICANT CHANGE UP (ref 10–40)
BILIRUB SERPL-MCNC: 0.3 MG/DL — SIGNIFICANT CHANGE UP (ref 0.2–1.2)
BUN SERPL-MCNC: 21 MG/DL — HIGH (ref 7–18)
CALCIUM SERPL-MCNC: 8.5 MG/DL — SIGNIFICANT CHANGE UP (ref 8.4–10.5)
CHLORIDE SERPL-SCNC: 100 MMOL/L — SIGNIFICANT CHANGE UP (ref 96–108)
CO2 SERPL-SCNC: 29 MMOL/L — SIGNIFICANT CHANGE UP (ref 22–31)
CREAT SERPL-MCNC: 0.78 MG/DL — SIGNIFICANT CHANGE UP (ref 0.5–1.3)
EGFR: 101 ML/MIN/1.73M2 — SIGNIFICANT CHANGE UP
EGFR: 101 ML/MIN/1.73M2 — SIGNIFICANT CHANGE UP
GLUCOSE BLDC GLUCOMTR-MCNC: 169 MG/DL — HIGH (ref 70–99)
GLUCOSE BLDC GLUCOMTR-MCNC: 190 MG/DL — HIGH (ref 70–99)
GLUCOSE BLDC GLUCOMTR-MCNC: 234 MG/DL — HIGH (ref 70–99)
GLUCOSE BLDC GLUCOMTR-MCNC: 335 MG/DL — HIGH (ref 70–99)
GLUCOSE SERPL-MCNC: 188 MG/DL — HIGH (ref 70–99)
HCT VFR BLD CALC: 39.1 % — SIGNIFICANT CHANGE UP (ref 39–50)
HGB BLD-MCNC: 12.5 G/DL — LOW (ref 13–17)
MAGNESIUM SERPL-MCNC: 2 MG/DL — SIGNIFICANT CHANGE UP (ref 1.6–2.6)
MCHC RBC-ENTMCNC: 27.2 PG — SIGNIFICANT CHANGE UP (ref 27–34)
MCHC RBC-ENTMCNC: 32 G/DL — SIGNIFICANT CHANGE UP (ref 32–36)
MCV RBC AUTO: 85 FL — SIGNIFICANT CHANGE UP (ref 80–100)
NRBC BLD AUTO-RTO: 0 /100 WBCS — SIGNIFICANT CHANGE UP (ref 0–0)
PHOSPHATE SERPL-MCNC: 3.4 MG/DL — SIGNIFICANT CHANGE UP (ref 2.5–4.5)
PLATELET # BLD AUTO: 160 K/UL — SIGNIFICANT CHANGE UP (ref 150–400)
POTASSIUM SERPL-MCNC: 3.9 MMOL/L — SIGNIFICANT CHANGE UP (ref 3.5–5.3)
POTASSIUM SERPL-SCNC: 3.9 MMOL/L — SIGNIFICANT CHANGE UP (ref 3.5–5.3)
PROT SERPL-MCNC: 6.5 G/DL — SIGNIFICANT CHANGE UP (ref 6–8.3)
RBC # BLD: 4.6 M/UL — SIGNIFICANT CHANGE UP (ref 4.2–5.8)
RBC # FLD: 14.1 % — SIGNIFICANT CHANGE UP (ref 10.3–14.5)
SODIUM SERPL-SCNC: 135 MMOL/L — SIGNIFICANT CHANGE UP (ref 135–145)
WBC # BLD: 14.26 K/UL — HIGH (ref 3.8–10.5)
WBC # FLD AUTO: 14.26 K/UL — HIGH (ref 3.8–10.5)

## 2025-04-07 RX ORDER — SODIUM CHLORIDE 9 G/1000ML
1000 INJECTION, SOLUTION INTRAVENOUS
Refills: 0 | Status: DISCONTINUED | OUTPATIENT
Start: 2025-04-07 | End: 2025-04-07

## 2025-04-07 RX ORDER — INSULIN LISPRO 100 U/ML
15 INJECTION, SOLUTION INTRAVENOUS; SUBCUTANEOUS
Refills: 0 | Status: DISCONTINUED | OUTPATIENT
Start: 2025-04-07 | End: 2025-04-09

## 2025-04-07 RX ORDER — PREDNISONE 20 MG/1
20 TABLET ORAL DAILY
Refills: 0 | Status: DISCONTINUED | OUTPATIENT
Start: 2025-04-08 | End: 2025-04-09

## 2025-04-07 RX ORDER — INSULIN GLARGINE-YFGN 100 [IU]/ML
60 INJECTION, SOLUTION SUBCUTANEOUS AT BEDTIME
Refills: 0 | Status: DISCONTINUED | OUTPATIENT
Start: 2025-04-07 | End: 2025-04-09

## 2025-04-07 RX ADMIN — Medication 1 DOSE(S): at 11:24

## 2025-04-07 RX ADMIN — Medication 150 MILLIGRAM(S): at 11:18

## 2025-04-07 RX ADMIN — Medication 1000 MILLIGRAM(S): at 06:41

## 2025-04-07 RX ADMIN — Medication 1000 MILLIGRAM(S): at 07:15

## 2025-04-07 RX ADMIN — Medication 40 MILLIGRAM(S): at 06:42

## 2025-04-07 RX ADMIN — OXYCODONE HYDROCHLORIDE 5 MILLIGRAM(S): 30 TABLET ORAL at 15:24

## 2025-04-07 RX ADMIN — Medication 1 TABLET(S): at 06:41

## 2025-04-07 RX ADMIN — OXYCODONE HYDROCHLORIDE 5 MILLIGRAM(S): 30 TABLET ORAL at 11:19

## 2025-04-07 RX ADMIN — OXYCODONE HYDROCHLORIDE 5 MILLIGRAM(S): 30 TABLET ORAL at 23:28

## 2025-04-07 RX ADMIN — INSULIN LISPRO 1: 100 INJECTION, SOLUTION INTRAVENOUS; SUBCUTANEOUS at 08:21

## 2025-04-07 RX ADMIN — FINASTERIDE 5 MILLIGRAM(S): 1 TABLET, FILM COATED ORAL at 11:19

## 2025-04-07 RX ADMIN — Medication 1 DROP(S): at 17:10

## 2025-04-07 RX ADMIN — METHYLPREDNISOLONE ACETATE 40 MILLIGRAM(S): 80 INJECTION, SUSPENSION INTRA-ARTICULAR; INTRALESIONAL; INTRAMUSCULAR; SOFT TISSUE at 06:41

## 2025-04-07 RX ADMIN — Medication 1 TABLET(S): at 17:09

## 2025-04-07 RX ADMIN — LATANOPROST PF 1 DROP(S): 0.05 SOLUTION/ DROPS OPHTHALMIC at 22:28

## 2025-04-07 RX ADMIN — TAMSULOSIN HYDROCHLORIDE 0.4 MILLIGRAM(S): 0.4 CAPSULE ORAL at 21:34

## 2025-04-07 RX ADMIN — FUROSEMIDE 40 MILLIGRAM(S): 10 INJECTION INTRAMUSCULAR; INTRAVENOUS at 06:41

## 2025-04-07 RX ADMIN — INSULIN LISPRO 15 UNIT(S): 100 INJECTION, SOLUTION INTRAVENOUS; SUBCUTANEOUS at 17:11

## 2025-04-07 RX ADMIN — Medication 69 UNIT(S): at 08:20

## 2025-04-07 RX ADMIN — INSULIN LISPRO 4: 100 INJECTION, SOLUTION INTRAVENOUS; SUBCUTANEOUS at 11:46

## 2025-04-07 RX ADMIN — ENOXAPARIN SODIUM 40 MILLIGRAM(S): 100 INJECTION SUBCUTANEOUS at 21:34

## 2025-04-07 RX ADMIN — Medication 0.5 MILLIGRAM(S): at 22:27

## 2025-04-07 RX ADMIN — INSULIN LISPRO 2: 100 INJECTION, SOLUTION INTRAVENOUS; SUBCUTANEOUS at 17:10

## 2025-04-07 RX ADMIN — OXYCODONE HYDROCHLORIDE 5 MILLIGRAM(S): 30 TABLET ORAL at 07:19

## 2025-04-07 RX ADMIN — INSULIN GLARGINE-YFGN 60 UNIT(S): 100 INJECTION, SOLUTION SUBCUTANEOUS at 21:33

## 2025-04-07 RX ADMIN — IPRATROPIUM BROMIDE AND ALBUTEROL SULFATE 3 MILLILITER(S): .5; 2.5 SOLUTION RESPIRATORY (INHALATION) at 20:42

## 2025-04-07 RX ADMIN — OXYCODONE HYDROCHLORIDE 5 MILLIGRAM(S): 30 TABLET ORAL at 22:28

## 2025-04-07 RX ADMIN — OXYCODONE HYDROCHLORIDE 5 MILLIGRAM(S): 30 TABLET ORAL at 11:49

## 2025-04-07 RX ADMIN — Medication 81 MILLIGRAM(S): at 11:18

## 2025-04-07 RX ADMIN — OXYCODONE HYDROCHLORIDE 5 MILLIGRAM(S): 30 TABLET ORAL at 15:54

## 2025-04-07 RX ADMIN — ATORVASTATIN CALCIUM 20 MILLIGRAM(S): 80 TABLET, FILM COATED ORAL at 22:30

## 2025-04-07 RX ADMIN — Medication 0.25 MILLIGRAM(S): at 21:34

## 2025-04-07 RX ADMIN — OXYCODONE HYDROCHLORIDE 5 MILLIGRAM(S): 30 TABLET ORAL at 06:41

## 2025-04-07 RX ADMIN — Medication 200 MILLIGRAM(S): at 11:18

## 2025-04-07 NOTE — PROGRESS NOTE ADULT - ASSESSMENT
64M from [facility], w/ PMHx of CAD, CHF, DM, CYNTHIA on nocturnal AVAPS-AE ([facility]), COPD (multiple intubations in past on 3L of home O2), peripheral neuropathy, GERD, HTN, HLD, obesity, polyarthritis, Pulmonary HTN, TIA, BPH, depression/anxiety p/w respiratory distress from NH. Placed on BiPAP, s/p lasix 80, solumedrol 125. Admitted to ICU for AHHRF 2/2 COPD, possible CHF exacerbation, aspiration pneumonia. Intubated for increased WOB and fever. TTE resulted poor read , positive  moderate pHTN. Treated with IV lasix, diamox, steroids. Successfully extubated to 2 L NC on 4/2. Patient started on his home nocturnal BiPAP. Patient declined BiPAP. Continued on home medications trazodone, alprazolam, risperidone, atorvastatin, finasteride. Patient downgraded to AI. Blood culture negative, sputum culture negative.  4/4: Patient complain of lower back pain, started acetaminophen 1G q8h x3days, lidocaine daily PRN oxycodone. Patient pending Speech and swallow evaluation for aspiration. Found to have Candidiasis UTI treating with Fluconazole 14 days.   4/5 Failed TOV. Indwelling mark cath placed.

## 2025-04-07 NOTE — CONSULT NOTE ADULT - SUBJECTIVE AND OBJECTIVE BOX
Patient is a 64y old  Male who presents with a chief complaint of AHRF 2/2 COPD exacerbation (07 Apr 2025 08:30)      HPI:  Pt is a 64M from [facility], w/ PMHx of CAD, CHF, DM, CYNTHIA on nocturnal bipap, COPD (multiple intubations in past on 3L of home O2), peripheral neuropathy, GERD, HTN, HLD, obesity, polyarthritis, Pulmonary HTN, TIA, BPH, depression/anxiety  p/w respiratory distress from NH. Pt mentating, A&Ox2-3, states that he has been having a productive cough, chest tightness, abdominal pain/gas. Otherwise pt appears mildly confused and anxious, unable to participate in full history taking.     Vitals in the ED: 98.1F, 118 HR, 126/72, 96% on BiPAP 18/8  limited RVP neg   Labs sig for: trop 145, proBNP 11,214, BUN/Cr 35/1.4, WC 15.42   ABG 7.21/89/53/36 > 7.23/83/66/35   EKG: sinus tachycardia, RBBB  CXR shows no consolidation, congestion, effusions pending official read     TTE from 10/2023- EF 50-55%, G1DD      Last admission [facility] 1/2024 for  increased agitation and paranoia at NH, treated for UTI with 3d CTX   (29 Mar 2025 15:08)      PAST MEDICAL & SURGICAL HISTORY:  COPD (chronic obstructive pulmonary disease)  Oxygen 2-3L at home      Stented coronary artery  2017      HLD (hyperlipidemia)      Pulmonary HTN      Type 2 diabetes mellitus without complication, with long-term current use of insulin      DM (diabetes mellitus)      CAD (coronary artery disease)      GERD (gastroesophageal reflux disease)      Insomnia      CHF (congestive heart failure)      HTN (hypertension)      Mood disorder      COPD (chronic obstructive pulmonary disease)      HTN (hypertension)      DM (diabetes mellitus)      CAD (coronary artery disease)      Bipolar disorder      CYNTHIA treated with BiPAP      No significant past surgical history             MEDICATIONS  (STANDING):  artificial  tears Solution 1 Drop(s) Both EYES two times a day  aspirin  chewable 81 milliGRAM(s) Oral daily  atorvastatin 20 milliGRAM(s) Oral at bedtime  enoxaparin Injectable 40 milliGRAM(s) SubCutaneous every 24 hours  finasteride 5 milliGRAM(s) Oral daily  fluconAZOLE   Tablet 200 milliGRAM(s) Oral daily  fluticasone propionate/ salmeterol 250-50 MICROgram(s) Diskus 1 Dose(s) Inhalation two times a day  furosemide    Tablet 40 milliGRAM(s) Oral daily  insulin lispro (ADMELOG) corrective regimen sliding scale   SubCutaneous three times a day before meals  insulin lispro (ADMELOG) corrective regimen sliding scale   SubCutaneous at bedtime  insulin NPH human recombinant 69 Unit(s) SubCutaneous before breakfast  insulin NPH human recombinant 46 Unit(s) SubCutaneous at bedtime  latanoprost 0.005% Ophthalmic Solution 1 Drop(s) Both EYES at bedtime  lidocaine   4% Patch 1 Patch Transdermal daily  methylPREDNISolone sodium succinate Injectable 40 milliGRAM(s) IV Push daily  nicotine -  14 mG/24Hr(s) Patch 1 Patch Transdermal daily  pantoprazole    Tablet 40 milliGRAM(s) Oral before breakfast  polyethylene glycol 3350 17 Gram(s) Oral daily  potassium phosphate / sodium phosphate Tablet (K-PHOS No. 2) 1 Tablet(s) Oral two times a day  risperiDONE   Tablet 0.25 milliGRAM(s) Oral at bedtime  senna 2 Tablet(s) Oral at bedtime  tamsulosin 0.4 milliGRAM(s) Oral at bedtime  tiotropium 2.5 MICROgram(s) Inhaler 2 Puff(s) Inhalation daily  traZODone 150 milliGRAM(s) Oral daily    MEDICATIONS  (PRN):  albuterol/ipratropium for Nebulization 3 milliLiter(s) Nebulizer every 6 hours PRN Shortness of Breath and/or Wheezing  ALPRAZolam 0.5 milliGRAM(s) Oral three times a day PRN anxiety  oxyCODONE    IR 2.5 milliGRAM(s) Oral every 4 hours PRN Moderate Pain (4 - 6)  oxyCODONE    IR 5 milliGRAM(s) Oral every 4 hours PRN Severe Pain (7 - 10)      FAMILY HISTORY:  No family history of hypertension    No family history of mental disorder    FH: myocardial infarction (Mother)    No family history of mental disorder        SOCIAL HISTORY:      REVIEW OF SYSTEMS:  CONSTITUTIONAL: No fever, weight loss, or fatigue  EYES: No eye pain, visual disturbances, or discharge  ENT:  No difficulty hearing, tinnitus, vertigo; No sinus or throat pain  NECK: No pain or stiffness  RESPIRATORY: No cough, wheezing, chills or hemoptysis; No Shortness of Breath  CARDIOVASCULAR: No chest pain, palpitations, passing out, dizziness, or leg swelling  GASTROINTESTINAL: No abdominal or epigastric pain. No nausea, vomiting, or hematemesis; No diarrhea or constipation. No melena or hematochezia.  GENITOURINARY: No dysuria, frequency, hematuria, or incontinence  NEUROLOGICAL: No headaches, memory loss, loss of strength, numbness, or tremors  SKIN: No itching, burning, rashes, or lesions   LYMPH Nodes: No enlarged glands  ENDOCRINE: No heat or cold intolerance; No hair loss  MUSCULOSKELETAL: No joint pain or swelling; No muscle, back, or extremity pain  PSYCHIATRIC: No depression, anxiety, mood swings, or difficulty sleeping  HEME/LYMPH: No easy bruising, or bleeding gums  ALLERGY AND IMMUNOLOGIC: No hives or eczema	        Vital Signs Last 24 Hrs  T(C): 36.5 (07 Apr 2025 05:30), Max: 36.9 (06 Apr 2025 12:23)  T(F): 97.7 (07 Apr 2025 05:30), Max: 98.4 (06 Apr 2025 12:23)  HR: 76 (07 Apr 2025 08:10) (76 - 91)  BP: 109/60 (07 Apr 2025 05:30) (109/60 - 126/67)  BP(mean): 73 (07 Apr 2025 05:30) (73 - 80)  RR: 17 (07 Apr 2025 05:30) (17 - 18)  SpO2: 98% (07 Apr 2025 08:10) (95% - 100%)    Parameters below as of 07 Apr 2025 05:30  Patient On (Oxygen Delivery Method): nasal cannula  O2 Flow (L/min): 4        Constitutional:    NC/AT:    HEENT:    Neck:  No JVD, bruits or thyromegaly    Respiratory:  Clear without rales or rhonchi    Cardiovascular:  RR without murmur, rub or gallop.    Gastrointestinal: Soft without hepatosplenomegaly.    Extremities: without cyanosis, clubbing or edema.    Neurological:  Oriented   x      . No gross sensory or motor defects.        LABS:                        12.5   14.26 )-----------( 160      ( 07 Apr 2025 06:13 )             39.1     04-07    135  |  100  |  21[H]  ----------------------------<  188[H]  3.9   |  29  |  0.78    Ca    8.5      07 Apr 2025 06:13  Phos  3.4     04-07  Mg     2.0     04-07    TPro  6.5  /  Alb  2.9[L]  /  TBili  0.3  /  DBili  x   /  AST  17  /  ALT  38  /  AlkPhos  56  04-07          Urinalysis Basic - ( 07 Apr 2025 06:13 )    Color: x / Appearance: x / SG: x / pH: x  Gluc: 188 mg/dL / Ketone: x  / Bili: x / Urobili: x   Blood: x / Protein: x / Nitrite: x   Leuk Esterase: x / RBC: x / WBC x   Sq Epi: x / Non Sq Epi: x / Bacteria: x      CAPILLARY BLOOD GLUCOSE      POCT Blood Glucose.: 169 mg/dL (07 Apr 2025 07:47)  POCT Blood Glucose.: 363 mg/dL (06 Apr 2025 21:13)  POCT Blood Glucose.: 375 mg/dL (06 Apr 2025 16:59)  POCT Blood Glucose.: 470 mg/dL (06 Apr 2025 11:39)  POCT Blood Glucose.: 448 mg/dL (06 Apr 2025 11:37)      RADIOLOGY & ADDITIONAL STUDIES: Patient is a 64y old  Male who presents with a chief complaint of AHRF 2/2 COPD exacerbation (07 Apr 2025 08:30)      HPI:  Pt is a 64M from [facility], w/ PMHx of CAD, CHF, DM, CYNTHIA on nocturnal bipap, COPD (multiple intubations in past on 3L of home O2), peripheral neuropathy, GERD, HTN, HLD, obesity, polyarthritis, Pulmonary HTN, TIA, BPH, depression/anxiety  p/w respiratory distress from NH. Pt mentating, A&Ox2-3, states that he has been having a productive cough, chest tightness, abdominal pain/gas. Otherwise pt appears mildly confused and anxious, unable to participate in full history taking. Endocrine consulted for dm management . Pt gets 70/30 insulin as out pt- and states he eats 2 meals for bf and needs more insulin. states he does not get hypoglycemia    Vitals in the ED: 98.1F, 118 HR, 126/72, 96% on BiPAP 18/8  limited RVP neg   Labs sig for: trop 145, proBNP 11,214, BUN/Cr 35/1.4, WC 15.42   ABG 7.21/89/53/36 > 7.23/83/66/35   EKG: sinus tachycardia, RBBB  CXR shows no consolidation, congestion, effusions pending official read     TTE from 10/2023- EF 50-55%, G1DD      Last admission [facility] 1/2024 for  increased agitation and paranoia at NH, treated for UTI with 3d CTX   (29 Mar 2025 15:08)      PAST MEDICAL & SURGICAL HISTORY:  COPD (chronic obstructive pulmonary disease)  Oxygen 2-3L at home      Stented coronary artery  2017      HLD (hyperlipidemia)      Pulmonary HTN      Type 2 diabetes mellitus without complication, with long-term current use of insulin      DM (diabetes mellitus)      CAD (coronary artery disease)      GERD (gastroesophageal reflux disease)      Insomnia      CHF (congestive heart failure)      HTN (hypertension)      Mood disorder      COPD (chronic obstructive pulmonary disease)      HTN (hypertension)      DM (diabetes mellitus)      CAD (coronary artery disease)      Bipolar disorder      CYNTHAI treated with BiPAP      No significant past surgical history             MEDICATIONS  (STANDING):  artificial  tears Solution 1 Drop(s) Both EYES two times a day  aspirin  chewable 81 milliGRAM(s) Oral daily  atorvastatin 20 milliGRAM(s) Oral at bedtime  enoxaparin Injectable 40 milliGRAM(s) SubCutaneous every 24 hours  finasteride 5 milliGRAM(s) Oral daily  fluconAZOLE   Tablet 200 milliGRAM(s) Oral daily  fluticasone propionate/ salmeterol 250-50 MICROgram(s) Diskus 1 Dose(s) Inhalation two times a day  furosemide    Tablet 40 milliGRAM(s) Oral daily  insulin lispro (ADMELOG) corrective regimen sliding scale   SubCutaneous three times a day before meals  insulin lispro (ADMELOG) corrective regimen sliding scale   SubCutaneous at bedtime  insulin NPH human recombinant 69 Unit(s) SubCutaneous before breakfast  insulin NPH human recombinant 46 Unit(s) SubCutaneous at bedtime  latanoprost 0.005% Ophthalmic Solution 1 Drop(s) Both EYES at bedtime  lidocaine   4% Patch 1 Patch Transdermal daily  methylPREDNISolone sodium succinate Injectable 40 milliGRAM(s) IV Push daily  nicotine -  14 mG/24Hr(s) Patch 1 Patch Transdermal daily  pantoprazole    Tablet 40 milliGRAM(s) Oral before breakfast  polyethylene glycol 3350 17 Gram(s) Oral daily  potassium phosphate / sodium phosphate Tablet (K-PHOS No. 2) 1 Tablet(s) Oral two times a day  risperiDONE   Tablet 0.25 milliGRAM(s) Oral at bedtime  senna 2 Tablet(s) Oral at bedtime  tamsulosin 0.4 milliGRAM(s) Oral at bedtime  tiotropium 2.5 MICROgram(s) Inhaler 2 Puff(s) Inhalation daily  traZODone 150 milliGRAM(s) Oral daily    MEDICATIONS  (PRN):  albuterol/ipratropium for Nebulization 3 milliLiter(s) Nebulizer every 6 hours PRN Shortness of Breath and/or Wheezing  ALPRAZolam 0.5 milliGRAM(s) Oral three times a day PRN anxiety  oxyCODONE    IR 2.5 milliGRAM(s) Oral every 4 hours PRN Moderate Pain (4 - 6)  oxyCODONE    IR 5 milliGRAM(s) Oral every 4 hours PRN Severe Pain (7 - 10)      FAMILY HISTORY:  No family history of hypertension    No family history of mental disorder    FH: myocardial infarction (Mother)    No family history of mental disorder        SOCIAL HISTORY:      REVIEW OF SYSTEMS:  CONSTITUTIONAL: No fever, weight loss, or fatigue  EYES: No eye pain, visual disturbances, or discharge  ENT:  No difficulty hearing, tinnitus, vertigo; No sinus or throat pain  NECK: No pain or stiffness  RESPIRATORY: No cough, wheezing, chills or hemoptysis; No Shortness of Breath  CARDIOVASCULAR: No chest pain, palpitations, passing out, dizziness, or leg swelling  GASTROINTESTINAL: No abdominal or epigastric pain. No nausea, vomiting, or hematemesis; No diarrhea or constipation. No melena or hematochezia.  GENITOURINARY: No dysuria, frequency, hematuria, or incontinence  NEUROLOGICAL: No headaches, memory loss, loss of strength, numbness, or tremors  SKIN: No itching, burning, rashes, or lesions   LYMPH Nodes: No enlarged glands  ENDOCRINE: No heat or cold intolerance; No hair loss  MUSCULOSKELETAL: No joint pain or swelling; No muscle, back, or extremity pain  PSYCHIATRIC: No depression, anxiety, mood swings, or difficulty sleeping  HEME/LYMPH: No easy bruising, or bleeding gums  ALLERGY AND IMMUNOLOGIC: No hives or eczema	        Vital Signs Last 24 Hrs  T(C): 36.5 (07 Apr 2025 05:30), Max: 36.9 (06 Apr 2025 12:23)  T(F): 97.7 (07 Apr 2025 05:30), Max: 98.4 (06 Apr 2025 12:23)  HR: 76 (07 Apr 2025 08:10) (76 - 91)  BP: 109/60 (07 Apr 2025 05:30) (109/60 - 126/67)  BP(mean): 73 (07 Apr 2025 05:30) (73 - 80)  RR: 17 (07 Apr 2025 05:30) (17 - 18)  SpO2: 98% (07 Apr 2025 08:10) (95% - 100%)    Parameters below as of 07 Apr 2025 05:30  Patient On (Oxygen Delivery Method): nasal cannula  O2 Flow (L/min): 4        Constitutional:    HEENT: nad    Neck:  No JVD, bruits or thyromegaly    Respiratory:  Clear without rales or rhonchi    Cardiovascular:  RR without murmur, rub or gallop.    Gastrointestinal: Soft without hepatosplenomegaly.    Extremities: without cyanosis, clubbing or edema.    Neurological:  Oriented   x 3     . No gross sensory or motor defects.        LABS:                        12.5   14.26 )-----------( 160      ( 07 Apr 2025 06:13 )             39.1     04-07    135  |  100  |  21[H]  ----------------------------<  188[H]  3.9   |  29  |  0.78    Ca    8.5      07 Apr 2025 06:13  Phos  3.4     04-07  Mg     2.0     04-07    TPro  6.5  /  Alb  2.9[L]  /  TBili  0.3  /  DBili  x   /  AST  17  /  ALT  38  /  AlkPhos  56  04-07          Urinalysis Basic - ( 07 Apr 2025 06:13 )    Color: x / Appearance: x / SG: x / pH: x  Gluc: 188 mg/dL / Ketone: x  / Bili: x / Urobili: x   Blood: x / Protein: x / Nitrite: x   Leuk Esterase: x / RBC: x / WBC x   Sq Epi: x / Non Sq Epi: x / Bacteria: x      CAPILLARY BLOOD GLUCOSE      POCT Blood Glucose.: 169 mg/dL (07 Apr 2025 07:47)  POCT Blood Glucose.: 363 mg/dL (06 Apr 2025 21:13)  POCT Blood Glucose.: 375 mg/dL (06 Apr 2025 16:59)  POCT Blood Glucose.: 470 mg/dL (06 Apr 2025 11:39)  POCT Blood Glucose.: 448 mg/dL (06 Apr 2025 11:37)      RADIOLOGY & ADDITIONAL STUDIES:

## 2025-04-07 NOTE — CONSULT NOTE ADULT - ASSESSMENT
Pt is a 64M from [facility], w/ PMHx of CAD, CHF, DM, CYNTHIA on nocturnal bipap, COPD (multiple intubations in past on 3L of home O2), peripheral neuropathy, GERD, HTN, HLD, obesity, polyarthritis, Pulmonary HTN, TIA, BPH, depression/anxiety  p/w respiratory distress from NH. Endocrine consulted for dm management . Pt gets 70/30 insulin as out pt- and states he eats 2 meals for bf and needs more insulin. states he does not get hypoglycemia

## 2025-04-07 NOTE — BH CONSULTATION LIAISON ASSESSMENT NOTE - NSBHMSERECMEM_PSY_A_CORE
What Is The Reason For Today's Visit?: Full Body Skin Examination
What Is The Reason For Today's Visit? (Being Monitored For X): the development of new lesions
Normal

## 2025-04-07 NOTE — CONSULT NOTE ADULT - NS ATTEND AMEND GEN_ALL_CORE FT
Impression: This is a 65 Y/O Male from [facility] with CYNTHIA on Bipap , presented  with respiratory distress from NH. Was in ICU for AHHRF 2/2 COPD, possible CHF exacerbation, aspiration pneumonia. Intubated for increased WOB and fever. Extubated on 04-02-25. For pulmonary evaluation of Acute on chronic hypoxic hypercapnic respiratory failure secondary to COPD Gold 4D , Chronic Diastolic CHF, ,Moderate Pulmonary HTN, Pneumonia .    Suggestion:  O2 saturation 96% with O2 supplement. Continue Oxygen supplementation 4L NC .  Reinforced the importance of Daily compliance to Bipap at Night . Has AVAPS in [facility] . Used Bipap @ [facility].  Continue PRN Duoneb via nebulization Q 6 Hours.   Continue Advair 250- 50 mcg Twice Daily.    Continue Tiotropium 2.5 mcg 2 puffs Daily.   Reinforced the importance of smoking cessation. on Nicotine patch .  On lasix 40 mg Oral daily.  DVT / GI prophylactic. On Lovenox 40 mg SQ daily.

## 2025-04-07 NOTE — CONSULT NOTE ADULT - SUBJECTIVE AND OBJECTIVE BOX
Time of visit:    CHIEF COMPLAINT: Patient is a 64y old  Male who presents with a chief complaint of AHRF 2/2 COPD exacerbation (07 Apr 2025 10:32)      HPI:  Pt is a 64M from [facility], w/ PMHx of CAD, CHF, DM, CYNTHIA on nocturnal bipap, COPD (multiple intubations in past on 3L of home O2), peripheral neuropathy, GERD, HTN, HLD, obesity, polyarthritis, Pulmonary HTN, TIA, BPH, depression/anxiety  p/w respiratory distress from NH. Pt mentating, A&Ox2-3, states that he has been having a productive cough, chest tightness, abdominal pain/gas. Otherwise pt appears mildly confused and anxious, unable to participate in full history taking.     Vitals in the ED: 98.1F, 118 HR, 126/72, 96% on BiPAP 18/8  limited RVP neg   Labs sig for: trop 145, proBNP 11,214, BUN/Cr 35/1.4, WC 15.42   ABG 7.21/89/53/36 > 7.23/83/66/35   EKG: sinus tachycardia, RBBB  CXR shows no consolidation, congestion, effusions pending official read     TTE from 10/2023- EF 50-55%, G1DD      Last admission [facility] 1/2024 for  increased agitation and paranoia at NH, treated for UTI with 3d CTX   (29 Mar 2025 15:08)   Patient seen and examined.     PAST MEDICAL & SURGICAL HISTORY:  COPD (chronic obstructive pulmonary disease)  Oxygen 2-3L at home      Stented coronary artery  2017      HLD (hyperlipidemia)      Pulmonary HTN      Type 2 diabetes mellitus without complication, with long-term current use of insulin      DM (diabetes mellitus)      CAD (coronary artery disease)      GERD (gastroesophageal reflux disease)      Insomnia      CHF (congestive heart failure)      HTN (hypertension)      Mood disorder      COPD (chronic obstructive pulmonary disease)      HTN (hypertension)      DM (diabetes mellitus)      CAD (coronary artery disease)      Bipolar disorder      CYNTHIA treated with BiPAP      No significant past surgical history          Allergies    No Known Allergies    Intolerances        MEDICATIONS  (STANDING):  artificial  tears Solution 1 Drop(s) Both EYES two times a day  aspirin  chewable 81 milliGRAM(s) Oral daily  atorvastatin 20 milliGRAM(s) Oral at bedtime  enoxaparin Injectable 40 milliGRAM(s) SubCutaneous every 24 hours  finasteride 5 milliGRAM(s) Oral daily  fluconAZOLE   Tablet 200 milliGRAM(s) Oral daily  fluticasone propionate/ salmeterol 250-50 MICROgram(s) Diskus 1 Dose(s) Inhalation two times a day  furosemide    Tablet 40 milliGRAM(s) Oral daily  insulin glargine Injectable (LANTUS) 60 Unit(s) SubCutaneous at bedtime  insulin lispro (ADMELOG) corrective regimen sliding scale   SubCutaneous three times a day before meals  insulin lispro (ADMELOG) corrective regimen sliding scale   SubCutaneous at bedtime  insulin lispro Injectable (ADMELOG) 15 Unit(s) SubCutaneous three times a day before meals  latanoprost 0.005% Ophthalmic Solution 1 Drop(s) Both EYES at bedtime  lidocaine   4% Patch 1 Patch Transdermal daily  nicotine -  14 mG/24Hr(s) Patch 1 Patch Transdermal daily  pantoprazole    Tablet 40 milliGRAM(s) Oral before breakfast  polyethylene glycol 3350 17 Gram(s) Oral daily  potassium phosphate / sodium phosphate Tablet (K-PHOS No. 2) 1 Tablet(s) Oral two times a day  risperiDONE   Tablet 0.25 milliGRAM(s) Oral at bedtime  senna 2 Tablet(s) Oral at bedtime  tamsulosin 0.4 milliGRAM(s) Oral at bedtime  tiotropium 2.5 MICROgram(s) Inhaler 2 Puff(s) Inhalation daily  traZODone 150 milliGRAM(s) Oral daily      MEDICATIONS  (PRN):  albuterol/ipratropium for Nebulization 3 milliLiter(s) Nebulizer every 6 hours PRN Shortness of Breath and/or Wheezing  ALPRAZolam 0.5 milliGRAM(s) Oral three times a day PRN anxiety  oxyCODONE    IR 2.5 milliGRAM(s) Oral every 4 hours PRN Moderate Pain (4 - 6)  oxyCODONE    IR 5 milliGRAM(s) Oral every 4 hours PRN Severe Pain (7 - 10)   Medications up to date at time of exam.    Medications up to date at time of exam.    FAMILY HISTORY:  No family history of hypertension    No family history of mental disorder    FH: myocardial infarction (Mother)    No family history of mental disorder        REVIEW OF SYSTEMS:    CONSTITUTIONAL:  No fevers, chills on exam. Denies night  sweats.    HEENT:  Denies blurred vision, sore throat or runny nose.    CARDIOVASCULAR:  Denies chest discomfort.     RESPIRATORY:  No SOB at rest. No cough and no wheezing.    GASTROINTESTINAL:  Denies abdominal pain. No vomiting on exam.     GENITOURINARY: No hematuria.     NEUROLOGIC:  No seizure on exam.     PSYCHIATRIC:  No emotional distress on exam.     PHYSICAL EXAMINATION:        Vital Signs Last 24 Hrs  T(C): 36.6 (07 Apr 2025 11:53), Max: 36.7 (06 Apr 2025 20:59)  T(F): 97.8 (07 Apr 2025 11:53), Max: 98 (06 Apr 2025 20:59)  HR: 91 (07 Apr 2025 11:53) (76 - 91)  BP: 111/60 (07 Apr 2025 11:53) (109/60 - 118/70)  BP(mean): 73 (07 Apr 2025 05:30) (73 - 80)  RR: 18 (07 Apr 2025 11:53) (17 - 18)  SpO2: 98% (07 Apr 2025 11:53) (98% - 100%)    Parameters below as of 07 Apr 2025 11:53  Patient On (Oxygen Delivery Method): nasal cannula  O2 Flow (L/min): 4     (if applicable)    General: Alert and oriented . No acute distress.     HEENT: Head is normocephalic and atraumatic. Extraocular muscles are intact. Mucous membranes are moist.     NECK: Supple, no palpable adenopathy.    LUNGS: Fair air entrance. Non labored. No wheezing. No use of accessory muscle.     HEART: S1 S2 Regular rate and rhythm. No JVD.     ABDOMEN: Soft, nontender, and nondistended.  Active bowel sounds.     EXTREMITIES: Without any cyanosis, clubbing, rash, lesions .    NEUROLOGIC: Awake, alert, oriented.     SKIN: Warm, dry, good turgor.      LABS:                        12.5   14.26 )-----------( 160      ( 07 Apr 2025 06:13 )             39.1     04-07    135  |  100  |  21[H]  ----------------------------<  188[H]  3.9   |  29  |  0.78    Ca    8.5      07 Apr 2025 06:13  Phos  3.4     04-07  Mg     2.0     04-07    TPro  6.5  /  Alb  2.9[L]  /  TBili  0.3  /  DBili  x   /  AST  17  /  ALT  38  /  AlkPhos  56  04-07      Urinalysis Basic - ( 07 Apr 2025 06:13 )    Color: x / Appearance: x / SG: x / pH: x  Gluc: 188 mg/dL / Ketone: x  / Bili: x / Urobili: x   Blood: x / Protein: x / Nitrite: x   Leuk Esterase: x / RBC: x / WBC x   Sq Epi: x / Non Sq Epi: x / Bacteria: x                      MICROBIOLOGY: (if applicable)    RADIOLOGY & ADDITIONAL STUDIES:  EKG:   CXR:  ECHO:    IMPRESSION: 64y Male PAST MEDICAL & SURGICAL HISTORY:  COPD (chronic obstructive pulmonary disease)  Oxygen 2-3L at home      Stented coronary artery  2017      HLD (hyperlipidemia)      Pulmonary HTN      Type 2 diabetes mellitus without complication, with long-term current use of insulin      DM (diabetes mellitus)      CAD (coronary artery disease)      GERD (gastroesophageal reflux disease)      Insomnia      CHF (congestive heart failure)      HTN (hypertension)      Mood disorder      COPD (chronic obstructive pulmonary disease)      HTN (hypertension)      DM (diabetes mellitus)      CAD (coronary artery disease)      Bipolar disorder      CYNTHIA treated with BiPAP      No significant past surgical history       Impression: This is a 63 Y/O Male from [facility] with CYNTHIA on Bipap , presented  with respiratory distress from NH. Was in ICU for AHHRF 2/2 COPD, possible CHF exacerbation, aspiration pneumonia. Intubated for increased WOB and fever. Extubated on 04-02-25. For pulmonary evaluation of Acute on chronic hypoxic hypercapnic respiratory failure secondary to COPD Gold 4D , Chronic Diastolic CHF, ,Moderate Pulmonary HTN, Pneumonia .    Suggestion:  O2 saturation 96% with O2 supplement. Continue Oxygen supplementation 4L NC .  Reinforced the importance of Daily compliance to Bipap at Night . Has AVAPS in [facility] . Used Bipap @ [facility].  Continue PRN Duoneb via nebulization Q 6 Hours.   Continue Advair 250- 50 mcg Twice Daily.    Continue Tiotropium 2.5 mcg 2 puffs Daily.   Reinforced the importance of smoking cessation. on Nicotine patch .  On lasix 40 mg Oral daily.  DVT / GI prophylactic. On Lovenox 40 mg SQ daily.             Time of visit:    CHIEF COMPLAINT: Patient is a 64y old  Male who presents with a chief complaint of AHRF 2/2 COPD exacerbation (07 Apr 2025 10:32)      HPI: Pt is a 64M from [facility], w/ PMHx of CAD, CHF, DM, CYNTHIA on nocturnal bipap, COPD (multiple intubations in past on 3L of home O2), peripheral neuropathy, GERD, HTN, HLD, obesity, polyarthritis, Pulmonary HTN, TIA, BPH, depression/anxiety  p/w respiratory distress from NH. Pt mentating, A&Ox2-3, states that he has been having a productive cough, chest tightness, abdominal pain/gas. Otherwise pt appears mildly confused and anxious, unable to participate in full history taking.     Vitals in the ED: 98.1F, 118 HR, 126/72, 96% on BiPAP 18/8  limited RVP neg   Labs sig for: trop 145, proBNP 11,214, BUN/Cr 35/1.4, WC 15.42   ABG 7.21/89/53/36 > 7.23/83/66/35   EKG: sinus tachycardia, RBBB  CXR shows no consolidation, congestion, effusions pending official read     TTE from 10/2023- EF 50-55%, G1DD      Last admission [facility] 1/2024 for  increased agitation and paranoia at NH, treated for UTI with 3d CTX   (29 Mar 2025 15:08)   Patient seen and examined.     PAST MEDICAL & SURGICAL HISTORY:  COPD (chronic obstructive pulmonary disease)  Oxygen 2-3L at home      Stented coronary artery  2017      HLD (hyperlipidemia)      Pulmonary HTN      Type 2 diabetes mellitus without complication, with long-term current use of insulin      DM (diabetes mellitus)      CAD (coronary artery disease)      GERD (gastroesophageal reflux disease)      Insomnia      CHF (congestive heart failure)      HTN (hypertension)      Mood disorder      COPD (chronic obstructive pulmonary disease)      HTN (hypertension)      DM (diabetes mellitus)      CAD (coronary artery disease)      Bipolar disorder      CYNTHIA treated with BiPAP      No significant past surgical history          Allergies    No Known Allergies    Intolerances        MEDICATIONS  (STANDING):  artificial  tears Solution 1 Drop(s) Both EYES two times a day  aspirin  chewable 81 milliGRAM(s) Oral daily  atorvastatin 20 milliGRAM(s) Oral at bedtime  enoxaparin Injectable 40 milliGRAM(s) SubCutaneous every 24 hours  finasteride 5 milliGRAM(s) Oral daily  fluconAZOLE   Tablet 200 milliGRAM(s) Oral daily  fluticasone propionate/ salmeterol 250-50 MICROgram(s) Diskus 1 Dose(s) Inhalation two times a day  furosemide    Tablet 40 milliGRAM(s) Oral daily  insulin glargine Injectable (LANTUS) 60 Unit(s) SubCutaneous at bedtime  insulin lispro (ADMELOG) corrective regimen sliding scale   SubCutaneous three times a day before meals  insulin lispro (ADMELOG) corrective regimen sliding scale   SubCutaneous at bedtime  insulin lispro Injectable (ADMELOG) 15 Unit(s) SubCutaneous three times a day before meals  latanoprost 0.005% Ophthalmic Solution 1 Drop(s) Both EYES at bedtime  lidocaine   4% Patch 1 Patch Transdermal daily  nicotine -  14 mG/24Hr(s) Patch 1 Patch Transdermal daily  pantoprazole    Tablet 40 milliGRAM(s) Oral before breakfast  polyethylene glycol 3350 17 Gram(s) Oral daily  potassium phosphate / sodium phosphate Tablet (K-PHOS No. 2) 1 Tablet(s) Oral two times a day  risperiDONE   Tablet 0.25 milliGRAM(s) Oral at bedtime  senna 2 Tablet(s) Oral at bedtime  tamsulosin 0.4 milliGRAM(s) Oral at bedtime  tiotropium 2.5 MICROgram(s) Inhaler 2 Puff(s) Inhalation daily  traZODone 150 milliGRAM(s) Oral daily      MEDICATIONS  (PRN):  albuterol/ipratropium for Nebulization 3 milliLiter(s) Nebulizer every 6 hours PRN Shortness of Breath and/or Wheezing  ALPRAZolam 0.5 milliGRAM(s) Oral three times a day PRN anxiety  oxyCODONE    IR 2.5 milliGRAM(s) Oral every 4 hours PRN Moderate Pain (4 - 6)  oxyCODONE    IR 5 milliGRAM(s) Oral every 4 hours PRN Severe Pain (7 - 10)   Medications up to date at time of exam.    Medications up to date at time of exam.    FAMILY HISTORY:  No family history of hypertension    No family history of mental disorder    FH: myocardial infarction (Mother)    No family history of mental disorder        REVIEW OF SYSTEMS:    CONSTITUTIONAL:  No fevers, chills on exam. Denies night  sweats.    HEENT:  Denies blurred vision, sore throat or runny nose.    CARDIOVASCULAR:  Denies chest discomfort.     RESPIRATORY:  No SOB at rest. No cough and no wheezing.    GASTROINTESTINAL:  Denies abdominal pain. No vomiting on exam.     GENITOURINARY: No hematuria.     NEUROLOGIC:  No seizure on exam.     PSYCHIATRIC:  No emotional distress on exam.     PHYSICAL EXAMINATION:        Vital Signs Last 24 Hrs  T(C): 36.6 (07 Apr 2025 11:53), Max: 36.7 (06 Apr 2025 20:59)  T(F): 97.8 (07 Apr 2025 11:53), Max: 98 (06 Apr 2025 20:59)  HR: 91 (07 Apr 2025 11:53) (76 - 91)  BP: 111/60 (07 Apr 2025 11:53) (109/60 - 118/70)  BP(mean): 73 (07 Apr 2025 05:30) (73 - 80)  RR: 18 (07 Apr 2025 11:53) (17 - 18)  SpO2: 98% (07 Apr 2025 11:53) (98% - 100%)    Parameters below as of 07 Apr 2025 11:53  Patient On (Oxygen Delivery Method): nasal cannula  O2 Flow (L/min): 4     (if applicable)    General: Alert and oriented . No acute distress.     HEENT: Head is normocephalic and atraumatic. Extraocular muscles are intact. Mucous membranes are moist.     NECK: Supple, no palpable adenopathy.    LUNGS: Fair air entrance. Non labored. No wheezing. No use of accessory muscle.     HEART: S1 S2 Regular rate and rhythm. No JVD.     ABDOMEN: Soft, nontender, and nondistended.  Active bowel sounds.     EXTREMITIES: Without any cyanosis, clubbing, rash, lesions .    NEUROLOGIC: Awake, alert, oriented.     SKIN: Warm, dry, good turgor.      LABS:                        12.5   14.26 )-----------( 160      ( 07 Apr 2025 06:13 )             39.1     04-07    135  |  100  |  21[H]  ----------------------------<  188[H]  3.9   |  29  |  0.78    Ca    8.5      07 Apr 2025 06:13  Phos  3.4     04-07  Mg     2.0     04-07    TPro  6.5  /  Alb  2.9[L]  /  TBili  0.3  /  DBili  x   /  AST  17  /  ALT  38  /  AlkPhos  56  04-07      Urinalysis Basic - ( 07 Apr 2025 06:13 )    Color: x / Appearance: x / SG: x / pH: x  Gluc: 188 mg/dL / Ketone: x  / Bili: x / Urobili: x   Blood: x / Protein: x / Nitrite: x   Leuk Esterase: x / RBC: x / WBC x   Sq Epi: x / Non Sq Epi: x / Bacteria: x                      MICROBIOLOGY: (if applicable)    RADIOLOGY & ADDITIONAL STUDIES:  EKG:   CXR:< from: Xray Chest 1 View- PORTABLE-Urgent (Xray Chest 1 View- PORTABLE-Urgent .) (04.02.25 @ 10:15) >  IMPRESSION:  Right base infiltrate. No pneumothorax.      < end of copied text >    ECHO:    IMPRESSION: 64y Male PAST MEDICAL & SURGICAL HISTORY:  COPD (chronic obstructive pulmonary disease)  Oxygen 2-3L at home      Stented coronary artery  2017      HLD (hyperlipidemia)      Pulmonary HTN      Type 2 diabetes mellitus without complication, with long-term current use of insulin      DM (diabetes mellitus)      CAD (coronary artery disease)      GERD (gastroesophageal reflux disease)      Insomnia      CHF (congestive heart failure)      HTN (hypertension)      Mood disorder      COPD (chronic obstructive pulmonary disease)      HTN (hypertension)      DM (diabetes mellitus)      CAD (coronary artery disease)      Bipolar disorder      CYNTHIA treated with BiPAP      No significant past surgical history       Impression: This is a 63 Y/O Male from [facility] with CYNTHIA on Bipap , presented  with respiratory distress from NH. Was in ICU for AHHRF 2/2 COPD, possible CHF exacerbation, aspiration pneumonia. Intubated for increased WOB and fever. Extubated on 04-02-25. For pulmonary evaluation of Acute on chronic hypoxic hypercapnic respiratory failure secondary to COPD Gold 4D , Chronic Diastolic CHF, ,Moderate Pulmonary HTN, Pneumonia .    Suggestion:  O2 saturation 96% with O2 supplement. Continue Oxygen supplementation 4L NC .  Reinforced the importance of Daily compliance to Bipap at Night . Has AVAPS in [facility] . Used Bipap @ [facility].  Continue PRN Duoneb via nebulization Q 6 Hours.   Continue Advair 250- 50 mcg Twice Daily.    Continue Tiotropium 2.5 mcg 2 puffs Daily.   Reinforced the importance of smoking cessation. on Nicotine patch .  On lasix 40 mg Oral daily.  DVT / GI prophylactic. On Lovenox 40 mg SQ daily.

## 2025-04-07 NOTE — PROGRESS NOTE ADULT - SUBJECTIVE AND OBJECTIVE BOX
DEVIKA CARBALLO    SCU progress note    INTERVAL HPI/OVERNIGHT EVENTS: ***No acute events overnight. Did not use BIPAP.    DNR [ ]   DNI  [  ]  FULL CODE    Covid - 19 PCR:     The 4Ms    What Matters Most: see GOC  Age appropriate Medications/Screen for High Risk Medication: Yes  Mentation: see CAM below  Mobility: defer to physical exam    The Confusion Assessment Method (CAM) Diagnostic Algorithm     1: Acute Onset or Fluctuating Course  - Is there evidence of an acute change in mental status from the patient’s baseline? Did the (abnormal) behavior  fluctuate during the day, that is, tend to come and go, or increase and decrease in severity?  [ ] YES [x ] NO     2: Inattention  - Did the patient have difficulty focusing attention, being easily distractible, or having difficulty keeping track of what was being said?  [ ] YES [x ] NO     3: Disorganized thinking  -Was the patient’s thinking disorganized or incoherent, such as rambling or irrelevant conversation, unclear or illogical flow of ideas, or unpredictable switching from subject to subject?  [ ] YES [x ] NO    4: Altered Level of consciousness?  [ ] YES [x ] NO    The diagnosis of delirium by CAM requires the presence of features 1 and 2 and either 3 or 4.    PRESSORS: [ ] YES [x ] NO  fluconAZOLE   Tablet 200 milliGRAM(s) Oral daily    Cardiovascular:  Heart Failure  Acute   Acute on Chronic  Chronic       furosemide    Tablet 40 milliGRAM(s) Oral daily    Pulmonary:  albuterol/ipratropium for Nebulization 3 milliLiter(s) Nebulizer every 6 hours PRN  fluticasone propionate/ salmeterol 250-50 MICROgram(s) Diskus 1 Dose(s) Inhalation two times a day  tiotropium 2.5 MICROgram(s) Inhaler 2 Puff(s) Inhalation daily    Hematalogic:  aspirin  chewable 81 milliGRAM(s) Oral daily  enoxaparin Injectable 40 milliGRAM(s) SubCutaneous every 24 hours    Other:  ALPRAZolam 0.5 milliGRAM(s) Oral three times a day PRN  artificial  tears Solution 1 Drop(s) Both EYES two times a day  atorvastatin 20 milliGRAM(s) Oral at bedtime  finasteride 5 milliGRAM(s) Oral daily  insulin lispro (ADMELOG) corrective regimen sliding scale   SubCutaneous three times a day before meals  insulin lispro (ADMELOG) corrective regimen sliding scale   SubCutaneous at bedtime  insulin NPH human recombinant 69 Unit(s) SubCutaneous before breakfast  insulin NPH human recombinant 46 Unit(s) SubCutaneous at bedtime  latanoprost 0.005% Ophthalmic Solution 1 Drop(s) Both EYES at bedtime  lidocaine   4% Patch 1 Patch Transdermal daily  methylPREDNISolone sodium succinate Injectable 40 milliGRAM(s) IV Push daily  nicotine -  14 mG/24Hr(s) Patch 1 Patch Transdermal daily  oxyCODONE    IR 2.5 milliGRAM(s) Oral every 4 hours PRN  oxyCODONE    IR 5 milliGRAM(s) Oral every 4 hours PRN  pantoprazole    Tablet 40 milliGRAM(s) Oral before breakfast  polyethylene glycol 3350 17 Gram(s) Oral daily  potassium phosphate / sodium phosphate Tablet (K-PHOS No. 2) 1 Tablet(s) Oral two times a day  risperiDONE   Tablet 0.25 milliGRAM(s) Oral at bedtime  senna 2 Tablet(s) Oral at bedtime  tamsulosin 0.4 milliGRAM(s) Oral at bedtime  traZODone 150 milliGRAM(s) Oral daily    albuterol/ipratropium for Nebulization 3 milliLiter(s) Nebulizer every 6 hours PRN  ALPRAZolam 0.5 milliGRAM(s) Oral three times a day PRN  artificial  tears Solution 1 Drop(s) Both EYES two times a day  aspirin  chewable 81 milliGRAM(s) Oral daily  atorvastatin 20 milliGRAM(s) Oral at bedtime  enoxaparin Injectable 40 milliGRAM(s) SubCutaneous every 24 hours  finasteride 5 milliGRAM(s) Oral daily  fluconAZOLE   Tablet 200 milliGRAM(s) Oral daily  fluticasone propionate/ salmeterol 250-50 MICROgram(s) Diskus 1 Dose(s) Inhalation two times a day  furosemide    Tablet 40 milliGRAM(s) Oral daily  insulin lispro (ADMELOG) corrective regimen sliding scale   SubCutaneous three times a day before meals  insulin lispro (ADMELOG) corrective regimen sliding scale   SubCutaneous at bedtime  insulin NPH human recombinant 69 Unit(s) SubCutaneous before breakfast  insulin NPH human recombinant 46 Unit(s) SubCutaneous at bedtime  latanoprost 0.005% Ophthalmic Solution 1 Drop(s) Both EYES at bedtime  lidocaine   4% Patch 1 Patch Transdermal daily  methylPREDNISolone sodium succinate Injectable 40 milliGRAM(s) IV Push daily  nicotine -  14 mG/24Hr(s) Patch 1 Patch Transdermal daily  oxyCODONE    IR 2.5 milliGRAM(s) Oral every 4 hours PRN  oxyCODONE    IR 5 milliGRAM(s) Oral every 4 hours PRN  pantoprazole    Tablet 40 milliGRAM(s) Oral before breakfast  polyethylene glycol 3350 17 Gram(s) Oral daily  potassium phosphate / sodium phosphate Tablet (K-PHOS No. 2) 1 Tablet(s) Oral two times a day  risperiDONE   Tablet 0.25 milliGRAM(s) Oral at bedtime  senna 2 Tablet(s) Oral at bedtime  tamsulosin 0.4 milliGRAM(s) Oral at bedtime  tiotropium 2.5 MICROgram(s) Inhaler 2 Puff(s) Inhalation daily  traZODone 150 milliGRAM(s) Oral daily    Drug Dosing Weight  Height (cm): 172.7 (29 Mar 2025 16:46)  Weight (kg): 96.3 (29 Mar 2025 16:46)  BMI (kg/m2): 32.3 (29 Mar 2025 16:46)  BSA (m2): 2.1 (29 Mar 2025 16:46)    CENTRAL LINE: [ ] YES [ x] NO  LOCATION:   DATE INSERTED:  REMOVE: [ ] YES [ ] NO  EXPLAIN:    AMOR: [x ] YES [ ] NO    DATE INSERTED:  REMOVE:  [ ] YES [x ] NO  EXPLAIN:  urinary retention    PAST MEDICAL & SURGICAL HISTORY:  COPD (chronic obstructive pulmonary disease)  Oxygen 2-3L at home      Stented coronary artery  2017      HLD (hyperlipidemia)      Pulmonary HTN      Type 2 diabetes mellitus without complication, with long-term current use of insulin      DM (diabetes mellitus)      CAD (coronary artery disease)      GERD (gastroesophageal reflux disease)      Insomnia      CHF (congestive heart failure)      HTN (hypertension)      Mood disorder      COPD (chronic obstructive pulmonary disease)      HTN (hypertension)      DM (diabetes mellitus)      CAD (coronary artery disease)      Bipolar disorder      CYNTHIA treated with BiPAP      No significant past surgical history                  04-06 @ 07:01  -  04-07 @ 07:00  --------------------------------------------------------  IN: 0 mL / OUT: 3150 mL / NET: -3150 mL            PHYSICAL EXAM:    GENERAL: NAD, well-groomed, well-developed  HEAD:  Atraumatic, Normocephalic  EYES: EOMI, PERRLA, conjunctiva and sclera clear  ENMT: No tonsillar erythema, exudates  NECK: Supple, No JVD  NERVOUS SYSTEM:  Alert & Oriented X3, Follows commands, moving all extremities.  CHEST/LUNG: Diminished breath sounds bilateral bases. No crackles or wheezing at time of exam.  HEART: Regular rate and rhythm; No murmurs, rubs, or gallops  ABDOMEN: Soft, Obese.  Nontender, Nondistended; Bowel sounds present  EXTREMITIES:  2+ Peripheral Pulses, No clubbing, cyanosis, or edema  LYMPH: No lymphadenopathy noted  SKIN: No rashes or lesions      LABS:  CBC Full  -  ( 07 Apr 2025 06:13 )  WBC Count : 14.26 K/uL  RBC Count : 4.60 M/uL  Hemoglobin : 12.5 g/dL  Hematocrit : 39.1 %  Platelet Count - Automated : 160 K/uL  Mean Cell Volume : 85.0 fl  Mean Cell Hemoglobin : 27.2 pg  Mean Cell Hemoglobin Concentration : 32.0 g/dL  Auto Neutrophil # : x  Auto Lymphocyte # : x  Auto Monocyte # : x  Auto Eosinophil # : x  Auto Basophil # : x  Auto Neutrophil % : x  Auto Lymphocyte % : x  Auto Monocyte % : x  Auto Eosinophil % : x  Auto Basophil % : x    04-07    135  |  100  |  21[H]  ----------------------------<  188[H]  3.9   |  29  |  0.78    Ca    8.5      07 Apr 2025 06:13  Phos  3.4     04-07  Mg     2.0     04-07    TPro  6.5  /  Alb  2.9[L]  /  TBili  0.3  /  DBili  x   /  AST  17  /  ALT  38  /  AlkPhos  56  04-07      Urinalysis Basic - ( 07 Apr 2025 06:13 )    Color: x / Appearance: x / SG: x / pH: x  Gluc: 188 mg/dL / Ketone: x  / Bili: x / Urobili: x   Blood: x / Protein: x / Nitrite: x   Leuk Esterase: x / RBC: x / WBC x   Sq Epi: x / Non Sq Epi: x / Bacteria: x            [  ]  DVT Prophylaxis  [  ]  Nutrition, Brand, Rate         Goal Rate        Abnormal Nutritional Status -  Malnutrition   Cachexia          RADIOLOGY & ADDITIONAL STUDIES:  ***  < from: Xray Chest 1 View- PORTABLE-Urgent (Xray Chest 1 View- PORTABLE-Urgent .) (04.02.25 @ 10:15) >    COMPARISON STUDY: Earlier the same afternoon    Frontal expiratory view of the chest shows the heart to be similar in   size. Endotracheal tube reaches the lower trachea. Feeding tube reaches   the central stomach.    The lungs show atelectasis or infiltrate of the lower right lung with   similar prominence of the central pulmonary arteries and there is no   evidence of pneumothorax nor pleural effusion.    Chest one view 4/2/2025 9:54 AM  Compared to the prior study, there is progression of right base   infiltrate. No pneumothorax.    IMPRESSION:  Right base infiltrate. No pneumothorax.        Thank you for the courtesy of this referral.    < end of copied text >    Goals of Care Discussion with Family/Proxy/Other  See note from 4/4/25     DEVIKA CARBALLO    SCU progress note    INTERVAL HPI/OVERNIGHT EVENTS: ***No acute events overnight. Did not use BIPAP.  Encourage BIPAP     DNR [ ]   DNI  [  ]  FULL CODE    Covid - 19 PCR:     The 4Ms    What Matters Most: see GOC  Age appropriate Medications/Screen for High Risk Medication: Yes  Mentation: see CAM below  Mobility: defer to physical exam    The Confusion Assessment Method (CAM) Diagnostic Algorithm     1: Acute Onset or Fluctuating Course  - Is there evidence of an acute change in mental status from the patient’s baseline? Did the (abnormal) behavior  fluctuate during the day, that is, tend to come and go, or increase and decrease in severity?  [ ] YES [x ] NO     2: Inattention  - Did the patient have difficulty focusing attention, being easily distractible, or having difficulty keeping track of what was being said?  [ ] YES [x ] NO     3: Disorganized thinking  -Was the patient’s thinking disorganized or incoherent, such as rambling or irrelevant conversation, unclear or illogical flow of ideas, or unpredictable switching from subject to subject?  [ ] YES [x ] NO    4: Altered Level of consciousness?  [ ] YES [x ] NO    The diagnosis of delirium by CAM requires the presence of features 1 and 2 and either 3 or 4.    PRESSORS: [ ] YES [x ] NO  fluconAZOLE   Tablet 200 milliGRAM(s) Oral daily    Cardiovascular:  Heart Failure  Acute   Acute on Chronic  Chronic       furosemide    Tablet 40 milliGRAM(s) Oral daily    Pulmonary:  albuterol/ipratropium for Nebulization 3 milliLiter(s) Nebulizer every 6 hours PRN  fluticasone propionate/ salmeterol 250-50 MICROgram(s) Diskus 1 Dose(s) Inhalation two times a day  tiotropium 2.5 MICROgram(s) Inhaler 2 Puff(s) Inhalation daily    Hematalogic:  aspirin  chewable 81 milliGRAM(s) Oral daily  enoxaparin Injectable 40 milliGRAM(s) SubCutaneous every 24 hours    Other:  ALPRAZolam 0.5 milliGRAM(s) Oral three times a day PRN  artificial  tears Solution 1 Drop(s) Both EYES two times a day  atorvastatin 20 milliGRAM(s) Oral at bedtime  finasteride 5 milliGRAM(s) Oral daily  insulin lispro (ADMELOG) corrective regimen sliding scale   SubCutaneous three times a day before meals  insulin lispro (ADMELOG) corrective regimen sliding scale   SubCutaneous at bedtime  insulin NPH human recombinant 69 Unit(s) SubCutaneous before breakfast  insulin NPH human recombinant 46 Unit(s) SubCutaneous at bedtime  latanoprost 0.005% Ophthalmic Solution 1 Drop(s) Both EYES at bedtime  lidocaine   4% Patch 1 Patch Transdermal daily  methylPREDNISolone sodium succinate Injectable 40 milliGRAM(s) IV Push daily  nicotine -  14 mG/24Hr(s) Patch 1 Patch Transdermal daily  oxyCODONE    IR 2.5 milliGRAM(s) Oral every 4 hours PRN  oxyCODONE    IR 5 milliGRAM(s) Oral every 4 hours PRN  pantoprazole    Tablet 40 milliGRAM(s) Oral before breakfast  polyethylene glycol 3350 17 Gram(s) Oral daily  potassium phosphate / sodium phosphate Tablet (K-PHOS No. 2) 1 Tablet(s) Oral two times a day  risperiDONE   Tablet 0.25 milliGRAM(s) Oral at bedtime  senna 2 Tablet(s) Oral at bedtime  tamsulosin 0.4 milliGRAM(s) Oral at bedtime  traZODone 150 milliGRAM(s) Oral daily    albuterol/ipratropium for Nebulization 3 milliLiter(s) Nebulizer every 6 hours PRN  ALPRAZolam 0.5 milliGRAM(s) Oral three times a day PRN  artificial  tears Solution 1 Drop(s) Both EYES two times a day  aspirin  chewable 81 milliGRAM(s) Oral daily  atorvastatin 20 milliGRAM(s) Oral at bedtime  enoxaparin Injectable 40 milliGRAM(s) SubCutaneous every 24 hours  finasteride 5 milliGRAM(s) Oral daily  fluconAZOLE   Tablet 200 milliGRAM(s) Oral daily  fluticasone propionate/ salmeterol 250-50 MICROgram(s) Diskus 1 Dose(s) Inhalation two times a day  furosemide    Tablet 40 milliGRAM(s) Oral daily  insulin lispro (ADMELOG) corrective regimen sliding scale   SubCutaneous three times a day before meals  insulin lispro (ADMELOG) corrective regimen sliding scale   SubCutaneous at bedtime  insulin NPH human recombinant 69 Unit(s) SubCutaneous before breakfast  insulin NPH human recombinant 46 Unit(s) SubCutaneous at bedtime  latanoprost 0.005% Ophthalmic Solution 1 Drop(s) Both EYES at bedtime  lidocaine   4% Patch 1 Patch Transdermal daily  methylPREDNISolone sodium succinate Injectable 40 milliGRAM(s) IV Push daily  nicotine -  14 mG/24Hr(s) Patch 1 Patch Transdermal daily  oxyCODONE    IR 2.5 milliGRAM(s) Oral every 4 hours PRN  oxyCODONE    IR 5 milliGRAM(s) Oral every 4 hours PRN  pantoprazole    Tablet 40 milliGRAM(s) Oral before breakfast  polyethylene glycol 3350 17 Gram(s) Oral daily  potassium phosphate / sodium phosphate Tablet (K-PHOS No. 2) 1 Tablet(s) Oral two times a day  risperiDONE   Tablet 0.25 milliGRAM(s) Oral at bedtime  senna 2 Tablet(s) Oral at bedtime  tamsulosin 0.4 milliGRAM(s) Oral at bedtime  tiotropium 2.5 MICROgram(s) Inhaler 2 Puff(s) Inhalation daily  traZODone 150 milliGRAM(s) Oral daily    Drug Dosing Weight  Height (cm): 172.7 (29 Mar 2025 16:46)  Weight (kg): 96.3 (29 Mar 2025 16:46)  BMI (kg/m2): 32.3 (29 Mar 2025 16:46)  BSA (m2): 2.1 (29 Mar 2025 16:46)    CENTRAL LINE: [ ] YES [ x] NO  LOCATION:   DATE INSERTED:  REMOVE: [ ] YES [ ] NO  EXPLAIN:    AMOR: [x ] YES [ ] NO    DATE INSERTED:  REMOVE:  [ ] YES [x ] NO  EXPLAIN:  urinary retention    PAST MEDICAL & SURGICAL HISTORY:  COPD (chronic obstructive pulmonary disease)  Oxygen 2-3L at home      Stented coronary artery  2017      HLD (hyperlipidemia)      Pulmonary HTN      Type 2 diabetes mellitus without complication, with long-term current use of insulin      DM (diabetes mellitus)      CAD (coronary artery disease)      GERD (gastroesophageal reflux disease)      Insomnia      CHF (congestive heart failure)      HTN (hypertension)      Mood disorder      COPD (chronic obstructive pulmonary disease)      HTN (hypertension)      DM (diabetes mellitus)      CAD (coronary artery disease)      Bipolar disorder      CYNTHIA treated with BiPAP      No significant past surgical history                  04-06 @ 07:01  -  04-07 @ 07:00  --------------------------------------------------------  IN: 0 mL / OUT: 3150 mL / NET: -3150 mL            PHYSICAL EXAM:    GENERAL: NAD, well-groomed, well-developed  HEAD:  Atraumatic, Normocephalic  EYES: EOMI, PERRLA, conjunctiva and sclera clear  ENMT: No tonsillar erythema, exudates  NECK: Supple, No JVD  NERVOUS SYSTEM:  Alert & Oriented X3, Follows commands, moving all extremities.  CHEST/LUNG: Diminished breath sounds bilateral bases. No crackles or wheezing at time of exam.  HEART: Regular rate and rhythm; No murmurs, rubs, or gallops  ABDOMEN: Soft, Obese.  Nontender, Nondistended; Bowel sounds present  EXTREMITIES:  2+ Peripheral Pulses, No clubbing, cyanosis, or edema  LYMPH: No lymphadenopathy noted  SKIN: No rashes or lesions      LABS:  CBC Full  -  ( 07 Apr 2025 06:13 )  WBC Count : 14.26 K/uL  RBC Count : 4.60 M/uL  Hemoglobin : 12.5 g/dL  Hematocrit : 39.1 %  Platelet Count - Automated : 160 K/uL  Mean Cell Volume : 85.0 fl  Mean Cell Hemoglobin : 27.2 pg  Mean Cell Hemoglobin Concentration : 32.0 g/dL  Auto Neutrophil # : x  Auto Lymphocyte # : x  Auto Monocyte # : x  Auto Eosinophil # : x  Auto Basophil # : x  Auto Neutrophil % : x  Auto Lymphocyte % : x  Auto Monocyte % : x  Auto Eosinophil % : x  Auto Basophil % : x    04-07    135  |  100  |  21[H]  ----------------------------<  188[H]  3.9   |  29  |  0.78    Ca    8.5      07 Apr 2025 06:13  Phos  3.4     04-07  Mg     2.0     04-07    TPro  6.5  /  Alb  2.9[L]  /  TBili  0.3  /  DBili  x   /  AST  17  /  ALT  38  /  AlkPhos  56  04-07      Urinalysis Basic - ( 07 Apr 2025 06:13 )    Color: x / Appearance: x / SG: x / pH: x  Gluc: 188 mg/dL / Ketone: x  / Bili: x / Urobili: x   Blood: x / Protein: x / Nitrite: x   Leuk Esterase: x / RBC: x / WBC x   Sq Epi: x / Non Sq Epi: x / Bacteria: x            [  ]  DVT Prophylaxis  [  ]  Nutrition, Brand, Rate         Goal Rate        Abnormal Nutritional Status -  Malnutrition   Cachexia          RADIOLOGY & ADDITIONAL STUDIES:  ***  < from: Xray Chest 1 View- PORTABLE-Urgent (Xray Chest 1 View- PORTABLE-Urgent .) (04.02.25 @ 10:15) >    COMPARISON STUDY: Earlier the same afternoon    Frontal expiratory view of the chest shows the heart to be similar in   size. Endotracheal tube reaches the lower trachea. Feeding tube reaches   the central stomach.    The lungs show atelectasis or infiltrate of the lower right lung with   similar prominence of the central pulmonary arteries and there is no   evidence of pneumothorax nor pleural effusion.    Chest one view 4/2/2025 9:54 AM  Compared to the prior study, there is progression of right base   infiltrate. No pneumothorax.    IMPRESSION:  Right base infiltrate. No pneumothorax.        Thank you for the courtesy of this referral.    < end of copied text >    Goals of Care Discussion with Family/Proxy/Other  See note from 4/4/25

## 2025-04-07 NOTE — PROGRESS NOTE ADULT - PROBLEM SELECTOR PLAN 1
Likely secondary to End stage COPD, and pna.  Continue BIPAP nightly and as needed during the day. Used BIPAP at [facility]. Was on AVAPS-AE  at [facility].  Continue oxygen support. Monitor oxygen saturation.  Continue bronchodilators and ICS.  D/C solumedrol and start prednisone with taper.  Continue antibiotics  Continue lasix.

## 2025-04-07 NOTE — CONSULT NOTE ADULT - PROBLEM SELECTOR RECOMMENDATION 9
uncontrolled wit hyperglycemia  due to steroids and noncompliance with diet  pt on 70/30 insulin9 am and 46 in pm- change to basal/ bolus insulin   lantus 60  admelog 15 ac tid  adjust dose when steroids taper   a1c-11.3  nutrtion eval  d/w prim team

## 2025-04-07 NOTE — CONSULT NOTE ADULT - SUBJECTIVE AND OBJECTIVE BOX
HPI:  Pt is a 64M from [facility], w/ PMHx of CAD, CHF, DM, CYNTHIA on nocturnal bipap, COPD (multiple intubations in past on 3L of home O2), peripheral neuropathy, GERD, HTN, HLD, obesity, polyarthritis, Pulmonary HTN, TIA, BPH, depression/anxiety  p/w respiratory distress from NH. Pt mentating, A&Ox2-3, states that he has been having a productive cough, chest tightness, abdominal pain/gas. Otherwise pt appears mildly confused and anxious, unable to participate in full history taking.     Vitals in the ED: 98.1F, 118 HR, 126/72, 96% on BiPAP 18/8  limited RVP neg   Labs sig for: trop 145, proBNP 11,214, BUN/Cr 35/1.4, WC 15.42   ABG 7.21/89/53/36 > 7.23/83/66/35   EKG: sinus tachycardia, RBBB  CXR shows no consolidation, congestion, effusions pending official read     TTE from 10/2023- EF 50-55%, G1DD      Last admission [facility] 1/2024 for  increased agitation and paranoia at NH, treated for UTI with 3d CTX   (29 Mar 2025 15:08)      PAST MEDICAL & SURGICAL HISTORY:  COPD (chronic obstructive pulmonary disease)  Oxygen 2-3L at home  Stented coronary artery  2017  HLD (hyperlipidemia)  Pulmonary HTN  Type 2 diabetes mellitus without complication, with long-term current use of insulin  DM (diabetes mellitus)  CAD (coronary artery disease)  GERD (gastroesophageal reflux disease)  Insomnia  CHF (congestive heart failure)  HTN (hypertension)  Mood disorder  COPD (chronic obstructive pulmonary disease)  HTN (hypertension)  DM (diabetes mellitus)  CAD (coronary artery disease)  Bipolar disorder  CYNTHIA treated with BiPAP      ALLERGIES:  No Known Allergies      FAMILY HISTORY:  No family history of hypertension    No family history of mental disorder    FH: myocardial infarction (Mother)    No family history of mental disorder      MEDICATIONS:  albuterol/ipratropium for Nebulization 3 milliLiter(s) Nebulizer every 6 hours PRN  ALPRAZolam 0.5 milliGRAM(s) Oral three times a day PRN  artificial  tears Solution 1 Drop(s) Both EYES two times a day  aspirin  chewable 81 milliGRAM(s) Oral daily  atorvastatin 20 milliGRAM(s) Oral at bedtime  enoxaparin Injectable 40 milliGRAM(s) SubCutaneous every 24 hours  finasteride 5 milliGRAM(s) Oral daily  fluconAZOLE   Tablet 200 milliGRAM(s) Oral daily  fluticasone propionate/ salmeterol 250-50 MICROgram(s) Diskus 1 Dose(s) Inhalation two times a day  furosemide    Tablet 40 milliGRAM(s) Oral daily  insulin glargine Injectable (LANTUS) 60 Unit(s) SubCutaneous at bedtime  insulin lispro (ADMELOG) corrective regimen sliding scale   SubCutaneous three times a day before meals  insulin lispro (ADMELOG) corrective regimen sliding scale   SubCutaneous at bedtime  insulin lispro Injectable (ADMELOG) 15 Unit(s) SubCutaneous three times a day before meals  latanoprost 0.005% Ophthalmic Solution 1 Drop(s) Both EYES at bedtime  lidocaine   4% Patch 1 Patch Transdermal daily  nicotine -  14 mG/24Hr(s) Patch 1 Patch Transdermal daily  oxyCODONE    IR 2.5 milliGRAM(s) Oral every 4 hours PRN  oxyCODONE    IR 5 milliGRAM(s) Oral every 4 hours PRN  pantoprazole    Tablet 40 milliGRAM(s) Oral before breakfast  polyethylene glycol 3350 17 Gram(s) Oral daily  potassium phosphate / sodium phosphate Tablet (K-PHOS No. 2) 1 Tablet(s) Oral two times a day  risperiDONE   Tablet 0.25 milliGRAM(s) Oral at bedtime  senna 2 Tablet(s) Oral at bedtime  tamsulosin 0.4 milliGRAM(s) Oral at bedtime  tiotropium 2.5 MICROgram(s) Inhaler 2 Puff(s) Inhalation daily  traZODone 150 milliGRAM(s) Oral daily      MEDICATIONS  (PRN):  albuterol/ipratropium for Nebulization 3 milliLiter(s) Nebulizer every 6 hours PRN Shortness of Breath and/or Wheezing  ALPRAZolam 0.5 milliGRAM(s) Oral three times a day PRN anxiety  oxyCODONE    IR 2.5 milliGRAM(s) Oral every 4 hours PRN Moderate Pain (4 - 6)  oxyCODONE    IR 5 milliGRAM(s) Oral every 4 hours PRN Severe Pain (7 - 10)      T(C): 36.2 (04-07-25 @ 20:55), Max: 36.6 (04-07-25 @ 11:53)  HR: 102 (04-07-25 @ 20:55) (76 - 102)  BP: 136/76 (04-07-25 @ 20:55) (109/60 - 136/76)  RR: 18 (04-07-25 @ 20:55) (17 - 18)  SpO2: 99% (04-07-25 @ 20:55) (98% - 100%)        REVIEW OF SYSTEMS:                           ALL ROS DONE [ X   ]    CONSTITUTIONAL:  LETHARGIC [   ], FEVER [   ], UNRESPONSIVE [   ]  CVS:  CP  [   ], SOB, [   ], PALPITATIONS [   ], DIZZYNESS [   ]  RS: COUGH [   ], SPUTUM [   ]  GI: ABDOMINAL PAIN [   ], NAUSEA [   ], VOMITINGS [   ], DIARRHEA [   ], CONSTIPATION [   ]  :  DYSURIA [   ], NOCTURIA [   ], INCREASED FREQUENCY [   ], DRIBLING [   ],  SKELETAL: PAINFUL JOINTS [   ], SWOLLEN JOINTS [   ], NECK ACHE [   ], LOW BACK ACHE [   ],  SKIN : ULCERS [   ], RASH [   ], ITCHING [   ]  CNS: HEAD ACHE [   ], DOUBLE VISION [   ], BLURRED VISION [   ], AMS / CONFUSION [   ], SEIZURES [   ], WEAKNESS [   ],TINGLING / NUMBNESS [   ]    PHYSICAL EXAMINATION:  GENERAL APPEARANCE: NO DISTRESS  HEENT:  NO PALLOR, NO  JVD,  NO   NODES, NECK SUPPLE  CVS: S1 +, S2 +,   RS: AEEB,  OCCASIONAL  RALES +,   NO RONCHI  ABD: SOFT, NT, NO, BS +  EXT: NO PE  SKIN: WARM,   SKELETAL:  ROM ACCEPTABLE  CNS:  AAO X 3      RADIOLOGY :  RADIOLOGY AND READINGS REVIEWED      ASSESSMENT :   PLAN:  HPI:  Pt is a 64M from [facility], w/ PMHx of CAD, CHF, DM, CYNTHIA on nocturnal bipap, COPD (multiple intubations in past on 3L of home O2), peripheral neuropathy, GERD, HTN, HLD, obesity, polyarthritis, Pulmonary HTN, TIA, BPH, depression/anxiety  p/w respiratory distress from NH. Pt mentating, A&Ox2-3, states that he has been having a productive cough, chest tightness, abdominal pain/gas. Otherwise pt appears mildly confused and anxious, unable to participate in full history taking.     Vitals in the ED: 98.1F, 118 HR, 126/72, 96% on BiPAP 18/8  limited RVP neg   Labs sig for: trop 145, proBNP 11,214, BUN/Cr 35/1.4, WC 15.42   ABG 7.21/89/53/36 > 7.23/83/66/35   EKG: sinus tachycardia, RBBB  CXR shows no consolidation, congestion, effusions pending official read     TTE from 10/2023- EF 50-55%, G1DD      Last admission [facility] 1/2024 for  increased agitation and paranoia at NH, treated for UTI with 3d CTX   (29 Mar 2025 15:08)    # PROGNOSIS IS GUARDED    # ACUTE ON CHRONIC HYPOXIC HYPERCAPNEIC RESPIRATORY FAILURE LIKELY SECONDARY TO WORSENING COPD AND PNA + PULMONOLOGY CONSULT  # SEPSIS S/T PNA  - PLACED ON ROCEPHIN + AZITHROMYCIN, F/U BCX  - STEROID TAPER  - PLACED ON BIPAP -- PATIENT IS NONCOMPLIANT. TEAM HAS COUNSELLED PATIENT AND FAMILY  - S/P MECHANICAL VENTILATION  - BRONCHODILATORS  - SYMBICORT  - CRITICAL CARE CONSULT    # DM, UNCONTROLLED  # HBA1C - 11.3  - LANTUS, TID INSULIN  - SSI + FS  - ENDOCRINOLOGY CONSULT    # ACUTE ON CHRONIC DIASTOLIC CONGESTIVE HEART FAILURE  - TTE - TECHNICALLY LIMITED  - LASIX    # ACUTE CYSTITIS  - FLUCONAZOLE, UCX - C. ALBICANS    # URINARY RETENTION  # BPH  - FLOMAX, FINASTERIDE  - FAILED TOV  - AMOR PLACED    # CHRONIC BACK PAIN  - PRN PAIN CONTROL    # HTN  # HLD  # HX OF CAD  # GERD  # GI AND DVT PPX

## 2025-04-07 NOTE — PROGRESS NOTE ADULT - PROBLEM SELECTOR PLAN 9
DVT ppx: Heparin SC change to Lovenox   GI ppx: PPI while on steroid    Pt from [facility] .   Dispo: Back to [facility] when optimized.   CM following  Overall prognosis guarded.   Full Code. DVT ppx: Heparin SC change to Lovenox   GI ppx: PPI while on steroid  Encourage BIPAP usage at night.    Pt from [facility] .   Dispo: Back to [facility] when optimized.   CM following  Overall prognosis guarded.   Full Code.

## 2025-04-08 LAB
ALBUMIN SERPL ELPH-MCNC: 2.8 G/DL — LOW (ref 3.5–5)
ALP SERPL-CCNC: 56 U/L — SIGNIFICANT CHANGE UP (ref 40–120)
ALT FLD-CCNC: 37 U/L DA — SIGNIFICANT CHANGE UP (ref 10–60)
ANION GAP SERPL CALC-SCNC: 3 MMOL/L — LOW (ref 5–17)
AST SERPL-CCNC: 18 U/L — SIGNIFICANT CHANGE UP (ref 10–40)
BILIRUB SERPL-MCNC: 0.3 MG/DL — SIGNIFICANT CHANGE UP (ref 0.2–1.2)
BUN SERPL-MCNC: 20 MG/DL — HIGH (ref 7–18)
CALCIUM SERPL-MCNC: 8.6 MG/DL — SIGNIFICANT CHANGE UP (ref 8.4–10.5)
CHLORIDE SERPL-SCNC: 100 MMOL/L — SIGNIFICANT CHANGE UP (ref 96–108)
CO2 SERPL-SCNC: 31 MMOL/L — SIGNIFICANT CHANGE UP (ref 22–31)
CREAT SERPL-MCNC: 0.8 MG/DL — SIGNIFICANT CHANGE UP (ref 0.5–1.3)
EGFR: 100 ML/MIN/1.73M2 — SIGNIFICANT CHANGE UP
EGFR: 100 ML/MIN/1.73M2 — SIGNIFICANT CHANGE UP
GLUCOSE BLDC GLUCOMTR-MCNC: 178 MG/DL — HIGH (ref 70–99)
GLUCOSE BLDC GLUCOMTR-MCNC: 199 MG/DL — HIGH (ref 70–99)
GLUCOSE BLDC GLUCOMTR-MCNC: 270 MG/DL — HIGH (ref 70–99)
GLUCOSE BLDC GLUCOMTR-MCNC: 278 MG/DL — HIGH (ref 70–99)
GLUCOSE SERPL-MCNC: 206 MG/DL — HIGH (ref 70–99)
HCT VFR BLD CALC: 38.7 % — LOW (ref 39–50)
HGB BLD-MCNC: 12.5 G/DL — LOW (ref 13–17)
MAGNESIUM SERPL-MCNC: 2 MG/DL — SIGNIFICANT CHANGE UP (ref 1.6–2.6)
MCHC RBC-ENTMCNC: 27.8 PG — SIGNIFICANT CHANGE UP (ref 27–34)
MCHC RBC-ENTMCNC: 32.3 G/DL — SIGNIFICANT CHANGE UP (ref 32–36)
MCV RBC AUTO: 86 FL — SIGNIFICANT CHANGE UP (ref 80–100)
MRSA PCR RESULT.: SIGNIFICANT CHANGE UP
NRBC BLD AUTO-RTO: 0 /100 WBCS — SIGNIFICANT CHANGE UP (ref 0–0)
PHOSPHATE SERPL-MCNC: 2.9 MG/DL — SIGNIFICANT CHANGE UP (ref 2.5–4.5)
PLATELET # BLD AUTO: 224 K/UL — SIGNIFICANT CHANGE UP (ref 150–400)
POTASSIUM SERPL-MCNC: 4 MMOL/L — SIGNIFICANT CHANGE UP (ref 3.5–5.3)
POTASSIUM SERPL-SCNC: 4 MMOL/L — SIGNIFICANT CHANGE UP (ref 3.5–5.3)
PROT SERPL-MCNC: 6.4 G/DL — SIGNIFICANT CHANGE UP (ref 6–8.3)
RBC # BLD: 4.5 M/UL — SIGNIFICANT CHANGE UP (ref 4.2–5.8)
RBC # FLD: 13.9 % — SIGNIFICANT CHANGE UP (ref 10.3–14.5)
S AUREUS DNA NOSE QL NAA+PROBE: DETECTED
SODIUM SERPL-SCNC: 134 MMOL/L — LOW (ref 135–145)
WBC # BLD: 13.19 K/UL — HIGH (ref 3.8–10.5)
WBC # FLD AUTO: 13.19 K/UL — HIGH (ref 3.8–10.5)

## 2025-04-08 RX ORDER — TIOTROPIUM BROMIDE INHALATION SPRAY 3.12 UG/1
2 SPRAY, METERED RESPIRATORY (INHALATION)
Qty: 0 | Refills: 0 | DISCHARGE
Start: 2025-04-08

## 2025-04-08 RX ORDER — FUROSEMIDE 10 MG/ML
1 INJECTION INTRAMUSCULAR; INTRAVENOUS
Qty: 0 | Refills: 0 | DISCHARGE
Start: 2025-04-08

## 2025-04-08 RX ORDER — INSULIN ASPART 100 [IU]/ML
58 INJECTION, SUSPENSION SUBCUTANEOUS
Refills: 0 | DISCHARGE

## 2025-04-08 RX ORDER — INSULIN GLARGINE-YFGN 100 [IU]/ML
60 INJECTION, SOLUTION SUBCUTANEOUS
Qty: 0 | Refills: 0 | DISCHARGE
Start: 2025-04-08

## 2025-04-08 RX ORDER — FLUCONAZOLE 150 MG
1 TABLET ORAL
Qty: 0 | Refills: 0 | DISCHARGE
Start: 2025-04-08 | End: 2025-04-18

## 2025-04-08 RX ORDER — INSULIN ASPART 100 [IU]/ML
46 INJECTION, SUSPENSION SUBCUTANEOUS
Refills: 0 | DISCHARGE

## 2025-04-08 RX ADMIN — Medication 1 DROP(S): at 06:22

## 2025-04-08 RX ADMIN — FINASTERIDE 5 MILLIGRAM(S): 1 TABLET, FILM COATED ORAL at 11:55

## 2025-04-08 RX ADMIN — OXYCODONE HYDROCHLORIDE 5 MILLIGRAM(S): 30 TABLET ORAL at 11:54

## 2025-04-08 RX ADMIN — OXYCODONE HYDROCHLORIDE 5 MILLIGRAM(S): 30 TABLET ORAL at 21:35

## 2025-04-08 RX ADMIN — FUROSEMIDE 40 MILLIGRAM(S): 10 INJECTION INTRAMUSCULAR; INTRAVENOUS at 06:21

## 2025-04-08 RX ADMIN — Medication 150 MILLIGRAM(S): at 13:00

## 2025-04-08 RX ADMIN — Medication 81 MILLIGRAM(S): at 12:01

## 2025-04-08 RX ADMIN — Medication 40 MILLIGRAM(S): at 06:20

## 2025-04-08 RX ADMIN — OXYCODONE HYDROCHLORIDE 5 MILLIGRAM(S): 30 TABLET ORAL at 22:27

## 2025-04-08 RX ADMIN — OXYCODONE HYDROCHLORIDE 5 MILLIGRAM(S): 30 TABLET ORAL at 07:03

## 2025-04-08 RX ADMIN — ENOXAPARIN SODIUM 40 MILLIGRAM(S): 100 INJECTION SUBCUTANEOUS at 21:45

## 2025-04-08 RX ADMIN — Medication 1 TABLET(S): at 06:20

## 2025-04-08 RX ADMIN — INSULIN GLARGINE-YFGN 60 UNIT(S): 100 INJECTION, SOLUTION SUBCUTANEOUS at 22:02

## 2025-04-08 RX ADMIN — Medication 200 MILLIGRAM(S): at 12:02

## 2025-04-08 RX ADMIN — OXYCODONE HYDROCHLORIDE 5 MILLIGRAM(S): 30 TABLET ORAL at 18:28

## 2025-04-08 RX ADMIN — LIDOCAINE HYDROCHLORIDE 1 PATCH: 20 JELLY TOPICAL at 23:06

## 2025-04-08 RX ADMIN — PREDNISONE 20 MILLIGRAM(S): 20 TABLET ORAL at 06:20

## 2025-04-08 RX ADMIN — ATORVASTATIN CALCIUM 20 MILLIGRAM(S): 80 TABLET, FILM COATED ORAL at 21:34

## 2025-04-08 RX ADMIN — IPRATROPIUM BROMIDE AND ALBUTEROL SULFATE 3 MILLILITER(S): .5; 2.5 SOLUTION RESPIRATORY (INHALATION) at 14:25

## 2025-04-08 RX ADMIN — Medication 1 DROP(S): at 17:29

## 2025-04-08 RX ADMIN — LIDOCAINE HYDROCHLORIDE 1 PATCH: 20 JELLY TOPICAL at 11:56

## 2025-04-08 RX ADMIN — LIDOCAINE HYDROCHLORIDE 1 PATCH: 20 JELLY TOPICAL at 19:40

## 2025-04-08 RX ADMIN — INSULIN LISPRO 3: 100 INJECTION, SOLUTION INTRAVENOUS; SUBCUTANEOUS at 17:19

## 2025-04-08 RX ADMIN — INSULIN LISPRO 15 UNIT(S): 100 INJECTION, SOLUTION INTRAVENOUS; SUBCUTANEOUS at 17:19

## 2025-04-08 RX ADMIN — Medication 1 TABLET(S): at 17:28

## 2025-04-08 RX ADMIN — INSULIN LISPRO 15 UNIT(S): 100 INJECTION, SOLUTION INTRAVENOUS; SUBCUTANEOUS at 08:28

## 2025-04-08 RX ADMIN — OXYCODONE HYDROCHLORIDE 5 MILLIGRAM(S): 30 TABLET ORAL at 17:28

## 2025-04-08 RX ADMIN — LATANOPROST PF 1 DROP(S): 0.05 SOLUTION/ DROPS OPHTHALMIC at 21:35

## 2025-04-08 RX ADMIN — IPRATROPIUM BROMIDE AND ALBUTEROL SULFATE 3 MILLILITER(S): .5; 2.5 SOLUTION RESPIRATORY (INHALATION) at 20:36

## 2025-04-08 RX ADMIN — INSULIN LISPRO 15 UNIT(S): 100 INJECTION, SOLUTION INTRAVENOUS; SUBCUTANEOUS at 12:33

## 2025-04-08 RX ADMIN — TAMSULOSIN HYDROCHLORIDE 0.4 MILLIGRAM(S): 0.4 CAPSULE ORAL at 21:33

## 2025-04-08 RX ADMIN — INSULIN LISPRO 1: 100 INJECTION, SOLUTION INTRAVENOUS; SUBCUTANEOUS at 08:28

## 2025-04-08 RX ADMIN — OXYCODONE HYDROCHLORIDE 5 MILLIGRAM(S): 30 TABLET ORAL at 06:33

## 2025-04-08 RX ADMIN — Medication 0.25 MILLIGRAM(S): at 21:34

## 2025-04-08 RX ADMIN — OXYCODONE HYDROCHLORIDE 5 MILLIGRAM(S): 30 TABLET ORAL at 12:54

## 2025-04-08 RX ADMIN — INSULIN LISPRO 3: 100 INJECTION, SOLUTION INTRAVENOUS; SUBCUTANEOUS at 12:32

## 2025-04-08 NOTE — CHART NOTE - NSCHARTNOTEFT_GEN_A_CORE
Bladder scan at 1600 showed 1600ml of urine.  16 FR mark catheter reinserted.  Patient tolerated procedure well.

## 2025-04-08 NOTE — PROGRESS NOTE ADULT - SUBJECTIVE AND OBJECTIVE BOX
Interval Events:      Allergies    No Known Allergies    Intolerances      Endocrine/Metabolic Medications:  atorvastatin 20 milliGRAM(s) Oral at bedtime  finasteride 5 milliGRAM(s) Oral daily  insulin glargine Injectable (LANTUS) 60 Unit(s) SubCutaneous at bedtime  insulin lispro (ADMELOG) corrective regimen sliding scale   SubCutaneous three times a day before meals  insulin lispro (ADMELOG) corrective regimen sliding scale   SubCutaneous at bedtime  insulin lispro Injectable (ADMELOG) 15 Unit(s) SubCutaneous three times a day before meals  predniSONE   Tablet 20 milliGRAM(s) Oral daily      Vital Signs Last 24 Hrs  T(C): 36.4 (08 Apr 2025 05:55), Max: 36.6 (07 Apr 2025 11:53)  T(F): 97.5 (08 Apr 2025 05:55), Max: 97.8 (07 Apr 2025 11:53)  HR: 85 (08 Apr 2025 05:55) (85 - 102)  BP: 116/66 (08 Apr 2025 05:55) (111/60 - 136/76)  BP(mean): 79 (08 Apr 2025 05:55) (79 - 79)  RR: 18 (08 Apr 2025 05:55) (18 - 18)  SpO2: 97% (08 Apr 2025 05:55) (97% - 99%)    Parameters below as of 08 Apr 2025 05:55  Patient On (Oxygen Delivery Method): nasal cannula  O2 Flow (L/min): 3        PHYSICAL EXAM  All physical exam findings normal, except those marked:  General:	Alert, active, cooperative, NAD, well hydrated  .		[] Abnormal:  Neck		Normal: supple, no cervical adenopathy, no palpable thyroid  .		[] Abnormal:  Cardiovascular	Normal: regular rate, normal S1, S2, no murmurs  .		[] Abnormal:  Respiratory	Normal: no chest wall deformity, normal respiratory pattern, CTA B/L  .		[] Abnormal:  Abdominal	Normal: soft, ND, NT, bowel sounds present, no masses, no organomegaly  .		[] Abnormal:  		Normal normal genitalia, testes descended, circumcised/uncircumcised  .		Micky stage:			Breast micky:  .		Menstrual history:  .		[] Abnormal:  Extremities	Normal: FROM x4  .		[] Abnormal:  Skin		Normal: intact and not indurated, no rash, no acanthosis nigricans  .		[] Abnormal:  Neurologic	Normal: grossly intact  .		[] Abnormal:    LABS                        12.5   13.19 )-----------( 224      ( 08 Apr 2025 06:25 )             38.7                               134    |  100    |  20                  Calcium: 8.6   / iCa: x      (04-08 @ 06:25)    ----------------------------<  206       Magnesium: 2.0                              4.0     |  31     |  0.80             Phosphorous: 2.9      TPro  6.4    /  Alb  2.8    /  TBili  0.3    /  DBili  x      /  AST  18     /  ALT  37     /  AlkPhos  56     08 Apr 2025 06:25    CAPILLARY BLOOD GLUCOSE      POCT Blood Glucose.: 199 mg/dL (08 Apr 2025 07:58)  POCT Blood Glucose.: 190 mg/dL (07 Apr 2025 21:11)  POCT Blood Glucose.: 234 mg/dL (07 Apr 2025 17:03)  POCT Blood Glucose.: 335 mg/dL (07 Apr 2025 11:29)        Assesment/plan       Interval Events:  pt in nad    Allergies    No Known Allergies    Intolerances      Endocrine/Metabolic Medications:  atorvastatin 20 milliGRAM(s) Oral at bedtime  finasteride 5 milliGRAM(s) Oral daily  insulin glargine Injectable (LANTUS) 60 Unit(s) SubCutaneous at bedtime  insulin lispro (ADMELOG) corrective regimen sliding scale   SubCutaneous three times a day before meals  insulin lispro (ADMELOG) corrective regimen sliding scale   SubCutaneous at bedtime  insulin lispro Injectable (ADMELOG) 15 Unit(s) SubCutaneous three times a day before meals  predniSONE   Tablet 20 milliGRAM(s) Oral daily      Vital Signs Last 24 Hrs  T(C): 36.4 (08 Apr 2025 05:55), Max: 36.6 (07 Apr 2025 11:53)  T(F): 97.5 (08 Apr 2025 05:55), Max: 97.8 (07 Apr 2025 11:53)  HR: 85 (08 Apr 2025 05:55) (85 - 102)  BP: 116/66 (08 Apr 2025 05:55) (111/60 - 136/76)  BP(mean): 79 (08 Apr 2025 05:55) (79 - 79)  RR: 18 (08 Apr 2025 05:55) (18 - 18)  SpO2: 97% (08 Apr 2025 05:55) (97% - 99%)    Parameters below as of 08 Apr 2025 05:55  Patient On (Oxygen Delivery Method): nasal cannula  O2 Flow (L/min): 3        PHYSICAL EXAM  All physical exam findings normal, except those marked:  General:	Alert, active, cooperative, NAD, well hydrated  .		[] Abnormal:  Neck		Normal: supple, no cervical adenopathy, no palpable thyroid  .		[] Abnormal:  Cardiovascular	Normal: regular rate, normal S1, S2, no murmurs  .		[] Abnormal:  Respiratory	Normal: no chest wall deformity, normal respiratory pattern, CTA B/L  .		[] Abnormal:  Abdominal	Normal: soft, ND, NT, bowel sounds present, no masses, no organomegaly  .		[] Abnormal:  		Normal normal genitalia, testes descended, circumcised/uncircumcised  .		Micky stage:			Breast micky:  .		Menstrual history:  .		[] Abnormal:  Extremities	Normal: FROM x4  .		[] Abnormal:  Skin		Normal: intact and not indurated, no rash, no acanthosis nigricans  .		[] Abnormal:  Neurologic	Normal: grossly intact  .		[] Abnormal:    LABS                        12.5   13.19 )-----------( 224      ( 08 Apr 2025 06:25 )             38.7                               134    |  100    |  20                  Calcium: 8.6   / iCa: x      (04-08 @ 06:25)    ----------------------------<  206       Magnesium: 2.0                              4.0     |  31     |  0.80             Phosphorous: 2.9      TPro  6.4    /  Alb  2.8    /  TBili  0.3    /  DBili  x      /  AST  18     /  ALT  37     /  AlkPhos  56     08 Apr 2025 06:25    CAPILLARY BLOOD GLUCOSE      POCT Blood Glucose.: 199 mg/dL (08 Apr 2025 07:58)  POCT Blood Glucose.: 190 mg/dL (07 Apr 2025 21:11)  POCT Blood Glucose.: 234 mg/dL (07 Apr 2025 17:03)  POCT Blood Glucose.: 335 mg/dL (07 Apr 2025 11:29)        Assesment/plan  Pt is a 64M from [facility], w/ PMHx of CAD, CHF, DM, CYNTHIA on nocturnal bipap, COPD (multiple intubations in past on 3L of home O2), peripheral neuropathy, GERD, HTN, HLD, obesity, polyarthritis, Pulmonary HTN, TIA, BPH, depression/anxiety  p/w respiratory distress from NH. Endocrine consulted for dm management . Pt gets 70/30 insulin as out pt- and states he eats 2 meals for bf and needs more insulin. states he does not get hypoglycemia           Problem/Recommendation - 1:  ·  Problem: DM (diabetes mellitus).   ·  Recommendation: uncontrolled wit hyperglycemia  due to steroids and noncompliance with diet  better controlled   cont lantus 60  admelog 15 ac tid  adjust dose when steroids taper - now on prednisone   a1c-11.3  nutrtion eval  d/w prim team.     Problem/Recommendation - 2:  ·  Problem: Acute respiratory failure with hypoxia and hypercapnia.   ·  Recommendation: tx per pulm  steroid taper.

## 2025-04-08 NOTE — CHART NOTE - NSCHARTNOTESELECT_GEN_ALL_CORE
Call with Family/Event Note
Event Note
Extubation/Event Note
Failed TOV/Event Note
Intubation Event/Event Note
Nutrition Services
Event Note
ICU downgrade/Event Note

## 2025-04-08 NOTE — DISCHARGE NOTE PROVIDER - PROVIDER TOKENS
FREE:[LAST:[Facility Provider],PHONE:[(   )    -],FAX:[(   )    -]],PROVIDER:[TOKEN:[[id number]],FOLLOWUP:[1-3 days],ESTABLISHEDPATIENT:[T]]

## 2025-04-08 NOTE — DISCHARGE NOTE PROVIDER - HOSPITAL COURSE
64M from [facility], w/ PMHx of CAD, CHF, DM, CYNTHIA on nocturnal AVAPS-AE (Trilogy), COPD (multiple intubations in past on 3L of home O2), peripheral neuropathy, GERD, HTN, HLD, obesity, polyarthritis, Pulmonary HTN, TIA, BPH, depression/anxiety p/w respiratory distress from NH. Placed on BiPAP, s/p lasix 80, solumedrol 125. Admitted to ICU for AHHRF 2/2 COPD, possible CHF exacerbation, aspiration pneumonia. Intubated for increased WOB and fever. TTE resulted poor read , positive  moderate pHTN. Treated with IV lasix, diamox, steroids. Successfully extubated to 2 L NC on 4/2. Patient started on his home nocturnal BiPAP. Patient declined BiPAP. Continued on home medications trazodone, alprazolam, risperidone, atorvastatin, finasteride. Patient downgraded to AI. Blood culture negative, sputum culture negative.  4/4: Patient complain of lower back pain, started acetaminophen 1G q8h x3days, lidocaine daily PRN oxycodone. Patient pending Speech and swallow evaluation for aspiration. Found to have Candidiasis UTI treating with Fluconazole 14 days.   4/5 Failed TOV. Indwelling mark cath placed. Started on on flomax on 4/2. Failed repeat TOV on 4/8.  16Fr mark placed on 4/8/25

## 2025-04-08 NOTE — DISCHARGE NOTE PROVIDER - NSDCMRMEDTOKEN_GEN_ALL_CORE_FT
ALPRAZolam 0.5 mg oral tablet: 1 tab(s) orally 3 times a day  Artificial Tears ophthalmic solution: 1 drop(s) in each eye 2 times a day  aspirin 81 mg oral capsule: 1 cap(s) orally once a day  atorvastatin 20 mg oral tablet: 1 tab(s) orally once a day  finasteride 5 mg oral tablet: 1 tab(s) orally once a day  Flomax 0.4 mg oral capsule: 1 cap(s) orally once a day  fluconazole 200 mg oral tablet: 1 tab(s) orally once a day  furosemide 40 mg oral tablet: 1 tab(s) orally once a day  gabapentin 100 mg oral capsule: 1 cap(s) orally 3 times a day  GuaiFENesin  mg-10 mg/5 mL oral liquid: 10 milliliter(s) orally 3 times a day started 3/26/25  insulin glargine 100 units/mL subcutaneous solution: 60 unit(s) subcutaneous once a day (at bedtime)  ipratropium-albuterol 0.5 mg-2.5 mg/3 mL inhalation solution: 3 milliliter(s) by nebulizer 4 times a day  latanoprost 0.005% ophthalmic solution: 1 drop(s) in each eye once a day (at bedtime)  melatonin 3 mg oral tablet: 2 tab(s) orally once a day (at bedtime)  nicotine 7 mg/24 hr transdermal film, extended release: 1 patch transdermally once a day  pantoprazole 40 mg oral delayed release tablet: 1 tab(s) orally once a day  polyethylene glycol 3350 oral powder for reconstitution: 17 gram(s) orally once a day  risperiDONE 0.25 mg oral tablet: 1 tab(s) orally once a day (at bedtime)  senna (sennosides) 8.6 mg oral tablet: 2 tab(s) orally once a day (at bedtime)  Symbicort 80 mcg-4.5 mcg/inh inhalation aerosol: 2 puff(s) inhaled 2 times a day  tiotropium 2.5 mcg/inh inhalation aerosol: 2 puff(s) inhaled once a day  traZODone 150 mg oral tablet: 1 tab(s) orally once a day   ALPRAZolam 0.5 mg oral tablet: 1 tab(s) orally 3 times a day  Artificial Tears ophthalmic solution: 1 drop(s) in each eye 2 times a day  aspirin 81 mg oral capsule: 1 cap(s) orally once a day  atorvastatin 20 mg oral tablet: 1 tab(s) orally once a day  finasteride 5 mg oral tablet: 1 tab(s) orally once a day  Flomax 0.4 mg oral capsule: 1 cap(s) orally once a day  fluconazole 200 mg oral tablet: 1 tab(s) orally once a day  furosemide 40 mg oral tablet: 1 tab(s) orally once a day  gabapentin 100 mg oral capsule: 1 cap(s) orally 3 times a day  GuaiFENesin  mg-10 mg/5 mL oral liquid: 10 milliliter(s) orally 3 times a day started 3/26/25  insulin glargine 100 units/mL subcutaneous solution: 60 unit(s) subcutaneous once a day (at bedtime)  ipratropium-albuterol 0.5 mg-2.5 mg/3 mL inhalation solution: 3 milliliter(s) by nebulizer 4 times a day  latanoprost 0.005% ophthalmic solution: 1 drop(s) in each eye once a day (at bedtime)  melatonin 3 mg oral tablet: 2 tab(s) orally once a day (at bedtime)  mupirocin 2% topical ointment: 1 applicatorful in each nostril every 12 hours 1 Apply topically to nares every 12 hours  nicotine 7 mg/24 hr transdermal film, extended release: 1 patch transdermally once a day  pantoprazole 40 mg oral delayed release tablet: 1 tab(s) orally once a day  polyethylene glycol 3350 oral powder for reconstitution: 17 gram(s) orally once a day  risperiDONE 0.25 mg oral tablet: 1 tab(s) orally once a day (at bedtime)  senna (sennosides) 8.6 mg oral tablet: 2 tab(s) orally once a day (at bedtime)  Symbicort 80 mcg-4.5 mcg/inh inhalation aerosol: 2 puff(s) inhaled 2 times a day  tiotropium 2.5 mcg/inh inhalation aerosol: 2 puff(s) inhaled once a day  traZODone 150 mg oral tablet: 1 tab(s) orally once a day

## 2025-04-08 NOTE — PROGRESS NOTE ADULT - SUBJECTIVE AND OBJECTIVE BOX
DEVIKA CARBALLO    SCU progress note    INTERVAL HPI/OVERNIGHT EVENTS: ***No acute events overnight. Will attempt TOV today.    DNR [ ]   DNI  [  ]   FULL CODE    Covid - 19 PCR:     The 4Ms    What Matters Most: see GOC  Age appropriate Medications/Screen for High Risk Medication: Yes  Mentation: see CAM below  Mobility: defer to physical exam    The Confusion Assessment Method (CAM) Diagnostic Algorithm     1: Acute Onset or Fluctuating Course  - Is there evidence of an acute change in mental status from the patient’s baseline? Did the (abnormal) behavior  fluctuate during the day, that is, tend to come and go, or increase and decrease in severity?  [ ] YES [x ] NO     2: Inattention  - Did the patient have difficulty focusing attention, being easily distractible, or having difficulty keeping track of what was being said?  [ ] YES [x ] NO     3: Disorganized thinking  -Was the patient’s thinking disorganized or incoherent, such as rambling or irrelevant conversation, unclear or illogical flow of ideas, or unpredictable switching from subject to subject?  [ ] YES [x ] NO    4: Altered Level of consciousness?  [ ] YES [x ] NO    The diagnosis of delirium by CAM requires the presence of features 1 and 2 and either 3 or 4.    PRESSORS: [ ] YES [x ] NO  fluconAZOLE   Tablet 200 milliGRAM(s) Oral daily    Cardiovascular:  Heart Failure  Acute   Acute on Chronic  Chronic       furosemide    Tablet 40 milliGRAM(s) Oral daily    Pulmonary:  albuterol/ipratropium for Nebulization 3 milliLiter(s) Nebulizer every 6 hours PRN  fluticasone propionate/ salmeterol 250-50 MICROgram(s) Diskus 1 Dose(s) Inhalation two times a day  tiotropium 2.5 MICROgram(s) Inhaler 2 Puff(s) Inhalation daily    Hematalogic:  aspirin  chewable 81 milliGRAM(s) Oral daily  enoxaparin Injectable 40 milliGRAM(s) SubCutaneous every 24 hours    Other:  ALPRAZolam 0.5 milliGRAM(s) Oral three times a day PRN  artificial  tears Solution 1 Drop(s) Both EYES two times a day  atorvastatin 20 milliGRAM(s) Oral at bedtime  finasteride 5 milliGRAM(s) Oral daily  insulin glargine Injectable (LANTUS) 60 Unit(s) SubCutaneous at bedtime  insulin lispro (ADMELOG) corrective regimen sliding scale   SubCutaneous three times a day before meals  insulin lispro (ADMELOG) corrective regimen sliding scale   SubCutaneous at bedtime  insulin lispro Injectable (ADMELOG) 15 Unit(s) SubCutaneous three times a day before meals  latanoprost 0.005% Ophthalmic Solution 1 Drop(s) Both EYES at bedtime  lidocaine   4% Patch 1 Patch Transdermal daily  nicotine -  14 mG/24Hr(s) Patch 1 Patch Transdermal daily  oxyCODONE    IR 5 milliGRAM(s) Oral every 4 hours PRN  oxyCODONE    IR 2.5 milliGRAM(s) Oral every 4 hours PRN  pantoprazole    Tablet 40 milliGRAM(s) Oral before breakfast  polyethylene glycol 3350 17 Gram(s) Oral daily  potassium phosphate / sodium phosphate Tablet (K-PHOS No. 2) 1 Tablet(s) Oral two times a day  predniSONE   Tablet 20 milliGRAM(s) Oral daily  risperiDONE   Tablet 0.25 milliGRAM(s) Oral at bedtime  senna 2 Tablet(s) Oral at bedtime  tamsulosin 0.4 milliGRAM(s) Oral at bedtime  traZODone 150 milliGRAM(s) Oral daily    albuterol/ipratropium for Nebulization 3 milliLiter(s) Nebulizer every 6 hours PRN  ALPRAZolam 0.5 milliGRAM(s) Oral three times a day PRN  artificial  tears Solution 1 Drop(s) Both EYES two times a day  aspirin  chewable 81 milliGRAM(s) Oral daily  atorvastatin 20 milliGRAM(s) Oral at bedtime  enoxaparin Injectable 40 milliGRAM(s) SubCutaneous every 24 hours  finasteride 5 milliGRAM(s) Oral daily  fluconAZOLE   Tablet 200 milliGRAM(s) Oral daily  fluticasone propionate/ salmeterol 250-50 MICROgram(s) Diskus 1 Dose(s) Inhalation two times a day  furosemide    Tablet 40 milliGRAM(s) Oral daily  insulin glargine Injectable (LANTUS) 60 Unit(s) SubCutaneous at bedtime  insulin lispro (ADMELOG) corrective regimen sliding scale   SubCutaneous three times a day before meals  insulin lispro (ADMELOG) corrective regimen sliding scale   SubCutaneous at bedtime  insulin lispro Injectable (ADMELOG) 15 Unit(s) SubCutaneous three times a day before meals  latanoprost 0.005% Ophthalmic Solution 1 Drop(s) Both EYES at bedtime  lidocaine   4% Patch 1 Patch Transdermal daily  nicotine -  14 mG/24Hr(s) Patch 1 Patch Transdermal daily  oxyCODONE    IR 5 milliGRAM(s) Oral every 4 hours PRN  oxyCODONE    IR 2.5 milliGRAM(s) Oral every 4 hours PRN  pantoprazole    Tablet 40 milliGRAM(s) Oral before breakfast  polyethylene glycol 3350 17 Gram(s) Oral daily  potassium phosphate / sodium phosphate Tablet (K-PHOS No. 2) 1 Tablet(s) Oral two times a day  predniSONE   Tablet 20 milliGRAM(s) Oral daily  risperiDONE   Tablet 0.25 milliGRAM(s) Oral at bedtime  senna 2 Tablet(s) Oral at bedtime  tamsulosin 0.4 milliGRAM(s) Oral at bedtime  tiotropium 2.5 MICROgram(s) Inhaler 2 Puff(s) Inhalation daily  traZODone 150 milliGRAM(s) Oral daily    Drug Dosing Weight  Height (cm): 172.7 (29 Mar 2025 16:46)  Weight (kg): 96.3 (29 Mar 2025 16:46)  BMI (kg/m2): 32.3 (29 Mar 2025 16:46)  BSA (m2): 2.1 (29 Mar 2025 16:46)    CENTRAL LINE: [ ] YES [ ] NO  LOCATION:   DATE INSERTED:  REMOVE: [ ] YES [ ] NO  EXPLAIN:    MT: [x ] YES [ ] NO    DATE INSERTED:  REMOVE:  [x ] YES [ ] NO  EXPLAIN:  TOV underway    PAST MEDICAL & SURGICAL HISTORY:  COPD (chronic obstructive pulmonary disease)  Oxygen 2-3L at home      Stented coronary artery  2017      HLD (hyperlipidemia)      Pulmonary HTN      Type 2 diabetes mellitus without complication, with long-term current use of insulin      DM (diabetes mellitus)      CAD (coronary artery disease)      GERD (gastroesophageal reflux disease)      Insomnia      CHF (congestive heart failure)      HTN (hypertension)      Mood disorder      COPD (chronic obstructive pulmonary disease)      HTN (hypertension)      DM (diabetes mellitus)      CAD (coronary artery disease)      Bipolar disorder      CYNTHIA treated with BiPAP      No significant past surgical history                  04-07 @ 07:01  -  04-08 @ 07:00  --------------------------------------------------------  IN: 0 mL / OUT: 4400 mL / NET: -4400 mL            PHYSICAL EXAM:    GENERAL: NAD, obese  HEAD:  Atraumatic, Normocephalic  EYES: EOMI, PERRLA, conjunctiva and sclera clear  ENMT: No tonsillar erythema, exudates  NECK: Supple, No JVD  NERVOUS SYSTEM:  Alert & Oriented X3, Follows commands, moving all extremities.  CHEST/LUNG: Diminished bilateral breath sounds with scattered rhonchi noted.  HEART: Regular rate and rhythm; No murmurs, rubs, or gallops  ABDOMEN: Soft, obese. Nontender, Nondistended; Bowel sounds present  EXTREMITIES:  2+ Peripheral Pulses, No clubbing, cyanosis, or edema  LYMPH: No lymphadenopathy noted  SKIN: No rashes or lesions      LABS:  CBC Full  -  ( 08 Apr 2025 06:25 )  WBC Count : 13.19 K/uL  RBC Count : 4.50 M/uL  Hemoglobin : 12.5 g/dL  Hematocrit : 38.7 %  Platelet Count - Automated : 224 K/uL  Mean Cell Volume : 86.0 fl  Mean Cell Hemoglobin : 27.8 pg  Mean Cell Hemoglobin Concentration : 32.3 g/dL  Auto Neutrophil # : x  Auto Lymphocyte # : x  Auto Monocyte # : x  Auto Eosinophil # : x  Auto Basophil # : x  Auto Neutrophil % : x  Auto Lymphocyte % : x  Auto Monocyte % : x  Auto Eosinophil % : x  Auto Basophil % : x    04-08    134[L]  |  100  |  20[H]  ----------------------------<  206[H]  4.0   |  31  |  0.80    Ca    8.6      08 Apr 2025 06:25  Phos  2.9     04-08  Mg     2.0     04-08    TPro  6.4  /  Alb  2.8[L]  /  TBili  0.3  /  DBili  x   /  AST  18  /  ALT  37  /  AlkPhos  56  04-08      Urinalysis Basic - ( 08 Apr 2025 06:25 )    Color: x / Appearance: x / SG: x / pH: x  Gluc: 206 mg/dL / Ketone: x  / Bili: x / Urobili: x   Blood: x / Protein: x / Nitrite: x   Leuk Esterase: x / RBC: x / WBC x   Sq Epi: x / Non Sq Epi: x / Bacteria: x            [  ]  DVT Prophylaxis  [  ]  Nutrition, Brand, Rate         Goal Rate        Abnormal Nutritional Status -  Malnutrition   Cachexia      Morbid Obesity BMI >/=40    RADIOLOGY & ADDITIONAL STUDIES:  ***  < from: Xray Chest 1 View- PORTABLE-Urgent (Xray Chest 1 View- PORTABLE-Urgent .) (04.02.25 @ 10:15) >  COMPARISON STUDY: Earlier the same afternoon    Frontal expiratory view of the chest shows the heart to be similar in   size. Endotracheal tube reaches the lower trachea. Feeding tube reaches   the central stomach.    The lungs show atelectasis or infiltrate of the lower right lung with   similar prominence of the central pulmonary arteries and there is no   evidence of pneumothorax nor pleural effusion.    Chest one view 4/2/2025 9:54 AM  Compared to the prior study, there is progression of right base   infiltrate. No pneumothorax.    IMPRESSION:  Right base infiltrate. No pneumothorax.    < end of copied text >    Goals of Care Discussion with Family/Proxy/Other   - see note from 4/4/25

## 2025-04-08 NOTE — PROGRESS NOTE ADULT - PROBLEM SELECTOR PLAN 1
Likely secondary to End stage COPD, and pna.  Continue BIPAP nightly and as needed during the day. Used BIPAP at [facility]. Was on AVAPS-AE  at [facility].  Continue oxygen support. Monitor oxygen saturation.  Continue bronchodilators and ICS.  Prednisone with taper.  Continue antibiotics  Continue lasix.

## 2025-04-08 NOTE — DISCHARGE NOTE PROVIDER - CARE PROVIDER_API CALL
Facility Provider,   Phone: (   )    -  Fax: (   )    -  Follow Up Time:     Raman Ramos  Internal Medicine  [address], [address] [place] [id number]  Phone: [phone]  Fax: [phone]  Established Patient  Follow Up Time: 1-3 days

## 2025-04-08 NOTE — PROGRESS NOTE ADULT - SUBJECTIVE AND OBJECTIVE BOX
Patient is a 64y old  Male who presents with a chief complaint of AHRF 2/2 COPD exacerbation (08 Apr 2025 17:03)    PATIENT IS SEEN AND EXAMINED IN SCU      ALLERGIES:  No Known Allergies      VITALS:    Vital Signs Last 24 Hrs  T(C): 36.2 (08 Apr 2025 21:30), Max: 36.5 (08 Apr 2025 13:58)  T(F): 97.2 (08 Apr 2025 21:30), Max: 97.7 (08 Apr 2025 13:58)  HR: 100 (08 Apr 2025 21:30) (85 - 107)  BP: 121/67 (08 Apr 2025 21:30) (105/77 - 130/69)  BP(mean): 84 (08 Apr 2025 17:25) (79 - 84)  RR: 18 (08 Apr 2025 21:30) (18 - 19)  SpO2: 94% (08 Apr 2025 21:30) (94% - 97%)    Parameters below as of 08 Apr 2025 21:30  Patient On (Oxygen Delivery Method): nasal cannula  O2 Flow (L/min): 3      LABS:    CBC Full  -  ( 08 Apr 2025 06:25 )  WBC Count : 13.19 K/uL  RBC Count : 4.50 M/uL  Hemoglobin : 12.5 g/dL  Hematocrit : 38.7 %  Platelet Count - Automated : 224 K/uL  Mean Cell Volume : 86.0 fl  Mean Cell Hemoglobin : 27.8 pg  Mean Cell Hemoglobin Concentration : 32.3 g/dL  Auto Neutrophil # : x  Auto Lymphocyte # : x  Auto Monocyte # : x  Auto Eosinophil # : x  Auto Basophil # : x  Auto Neutrophil % : x  Auto Lymphocyte % : x  Auto Monocyte % : x  Auto Eosinophil % : x  Auto Basophil % : x      04-08    134[L]  |  100  |  20[H]  ----------------------------<  206[H]  4.0   |  31  |  0.80    Ca    8.6      08 Apr 2025 06:25  Phos  2.9     04-08  Mg     2.0     04-08    TPro  6.4  /  Alb  2.8[L]  /  TBili  0.3  /  DBili  x   /  AST  18  /  ALT  37  /  AlkPhos  56  04-08    CAPILLARY BLOOD GLUCOSE      POCT Blood Glucose.: 178 mg/dL (08 Apr 2025 21:03)  POCT Blood Glucose.: 270 mg/dL (08 Apr 2025 16:51)  POCT Blood Glucose.: 278 mg/dL (08 Apr 2025 11:56)  POCT Blood Glucose.: 199 mg/dL (08 Apr 2025 07:58)        LIVER FUNCTIONS - ( 08 Apr 2025 06:25 )  Alb: 2.8 g/dL / Pro: 6.4 g/dL / ALK PHOS: 56 U/L / ALT: 37 U/L DA / AST: 18 U/L / GGT: x           Creatinine Trend: 0.80<--, 0.78<--, 0.73<--, 0.67<--, 0.62<--, 0.73<--  I&O's Summary    07 Apr 2025 07:01  -  08 Apr 2025 07:00  --------------------------------------------------------  IN: 0 mL / OUT: 4400 mL / NET: -4400 mL    08 Apr 2025 07:01  -  08 Apr 2025 21:43  --------------------------------------------------------  IN: 0 mL / OUT: 2200 mL / NET: -2200 mL            ET Tube ET Tube  03-30 @ 19:57   Commensal baldomero consistent with body site  --    Rare polymorphonuclear leukocytes per low power field  Rare Squamous epithelial cells per low power field  Few Gram positive cocci in pairs seen per oil power field  Rare Gram Negative Rods seen per oil power field      Catheterized  03-29 @ 15:40   >100,000 CFU/ml Candida albicans "Susceptibilities not performed"  --  --      Blood Blood-Peripheral  03-29 @ 12:10   No growth at 5 days  --  --      Blood Blood-Peripheral  03-29 @ 12:00   No growth at 5 days  --  --          MEDICATIONS:    MEDICATIONS  (STANDING):  artificial  tears Solution 1 Drop(s) Both EYES two times a day  aspirin  chewable 81 milliGRAM(s) Oral daily  atorvastatin 20 milliGRAM(s) Oral at bedtime  enoxaparin Injectable 40 milliGRAM(s) SubCutaneous every 24 hours  finasteride 5 milliGRAM(s) Oral daily  fluconAZOLE   Tablet 200 milliGRAM(s) Oral daily  fluticasone propionate/ salmeterol 250-50 MICROgram(s) Diskus 1 Dose(s) Inhalation two times a day  furosemide    Tablet 40 milliGRAM(s) Oral daily  insulin glargine Injectable (LANTUS) 60 Unit(s) SubCutaneous at bedtime  insulin lispro (ADMELOG) corrective regimen sliding scale   SubCutaneous three times a day before meals  insulin lispro (ADMELOG) corrective regimen sliding scale   SubCutaneous at bedtime  insulin lispro Injectable (ADMELOG) 15 Unit(s) SubCutaneous three times a day before meals  latanoprost 0.005% Ophthalmic Solution 1 Drop(s) Both EYES at bedtime  lidocaine   4% Patch 1 Patch Transdermal daily  nicotine -  14 mG/24Hr(s) Patch 1 Patch Transdermal daily  pantoprazole    Tablet 40 milliGRAM(s) Oral before breakfast  polyethylene glycol 3350 17 Gram(s) Oral daily  potassium phosphate / sodium phosphate Tablet (K-PHOS No. 2) 1 Tablet(s) Oral two times a day  predniSONE   Tablet 20 milliGRAM(s) Oral daily  risperiDONE   Tablet 0.25 milliGRAM(s) Oral at bedtime  senna 2 Tablet(s) Oral at bedtime  tamsulosin 0.4 milliGRAM(s) Oral at bedtime  tiotropium 2.5 MICROgram(s) Inhaler 2 Puff(s) Inhalation daily  traZODone 150 milliGRAM(s) Oral daily      MEDICATIONS  (PRN):  albuterol/ipratropium for Nebulization 3 milliLiter(s) Nebulizer every 6 hours PRN Shortness of Breath and/or Wheezing  ALPRAZolam 0.5 milliGRAM(s) Oral three times a day PRN anxiety  oxyCODONE    IR 2.5 milliGRAM(s) Oral every 4 hours PRN Moderate Pain (4 - 6)  oxyCODONE    IR 5 milliGRAM(s) Oral every 4 hours PRN Severe Pain (7 - 10)      REVIEW OF SYSTEMS:                           ALL ROS DONE [ X   ]    CONSTITUTIONAL:  LETHARGIC [   ], FEVER [   ], UNRESPONSIVE [   ]  CVS:  CP  [   ], SOB, [   ], PALPITATIONS [   ], DIZZYNESS [   ]  RS: COUGH [   ], SPUTUM [   ]  GI: ABDOMINAL PAIN [   ], NAUSEA [   ], VOMITINGS [   ], DIARRHEA [   ], CONSTIPATION [   ]  :  DYSURIA [   ], NOCTURIA [   ], INCREASED FREQUENCY [   ], DRIBLING [   ],  SKELETAL: PAINFUL JOINTS [   ], SWOLLEN JOINTS [   ], NECK ACHE [   ], LOW BACK ACHE [   ],  SKIN : ULCERS [   ], RASH [   ], ITCHING [   ]  CNS: HEAD ACHE [   ], DOUBLE VISION [   ], BLURRED VISION [   ], AMS / CONFUSION [   ], SEIZURES [   ], WEAKNESS [   ],TINGLING / NUMBNESS [   ]      PHYSICAL EXAMINATION:  GENERAL APPEARANCE: NO DISTRESS  HEENT:  NO PALLOR, NO  JVD,  NO   NODES, NECK SUPPLE  CVS: S1 +, S2 +,   RS: AEEB,  OCCASIONAL  RALES +,   NO RONCHI  ABD: SOFT, NT, NO, BS +  EXT: NO PE  SKIN: WARM,   SKELETAL:  ROM ACCEPTABLE  CNS:  AAO X 3      RADIOLOGY :  RADIOLOGY AND READINGS REVIEWED      ASSESSMENT :   PLAN:  HPI:  Pt is a 64M from [facility], w/ PMHx of CAD, CHF, DM, CYNTHIA on nocturnal bipap, COPD (multiple intubations in past on 3L of home O2), peripheral neuropathy, GERD, HTN, HLD, obesity, polyarthritis, Pulmonary HTN, TIA, BPH, depression/anxiety  p/w respiratory distress from NH. Pt mentating, A&Ox2-3, states that he has been having a productive cough, chest tightness, abdominal pain/gas. Otherwise pt appears mildly confused and anxious, unable to participate in full history taking.     Vitals in the ED: 98.1F, 118 HR, 126/72, 96% on BiPAP 18/8  limited RVP neg   Labs sig for: trop 145, proBNP 11,214, BUN/Cr 35/1.4, WC 15.42   ABG 7.21/89/53/36 > 7.23/83/66/35   EKG: sinus tachycardia, RBBB  CXR shows no consolidation, congestion, effusions pending official read     TTE from 10/2023- EF 50-55%, G1DD      Last admission [facility] 1/2024 for  increased agitation and paranoia at NH, treated for UTI with 3d CTX   (29 Mar 2025 15:08)    # PROGNOSIS IS GUARDED    # ACUTE ON CHRONIC HYPOXIC HYPERCAPNEIC RESPIRATORY FAILURE LIKELY SECONDARY TO WORSENING COPD AND PNA + PULMONOLOGY CONSULT  # SEPSIS S/T PNA  - PLACED ON ROCEPHIN + AZITHROMYCIN, F/U BCX  - STEROID TAPER  - PLACED ON BIPAP -- PATIENT IS NONCOMPLIANT. TEAM HAS COUNSELLED PATIENT AND FAMILY  - S/P MECHANICAL VENTILATION  - BRONCHODILATORS  - SYMBICORT  - CRITICAL CARE CONSULT    # DM, UNCONTROLLED  # HBA1C - 11.3  - LANTUS, TID INSULIN  - SSI + FS  - ENDOCRINOLOGY CONSULT    # ACUTE ON CHRONIC DIASTOLIC CONGESTIVE HEART FAILURE  - TTE - TECHNICALLY LIMITED  - LASIX    # ACUTE CYSTITIS  - FLUCONAZOLE, UCX - C. ALBICANS    # URINARY RETENTION  # BPH  - FLOMAX, FINASTERIDE  - FAILED TOV  - AMOR PLACED    # CHRONIC BACK PAIN  - PRN PAIN CONTROL    # HTN  # HLD  # HX OF CAD  # GERD  # GI AND DVT PPX

## 2025-04-08 NOTE — PROGRESS NOTE ADULT - PROBLEM SELECTOR PLAN 9
DVT and GI prophylaxis  Encourage BIPAP usage at night.  Return to ECC when cleared.  TOV ordered.  Overall prognosis guarded.  Remains FULL CODE.

## 2025-04-08 NOTE — DISCHARGE NOTE PROVIDER - NSDCCPCAREPLAN_GEN_ALL_CORE_FT
PRINCIPAL DISCHARGE DIAGNOSIS  Diagnosis: Acute respiratory failure with hypoxia and hypercapnia  Assessment and Plan of Treatment: Continue using BIPAP nightly as ordered. Use oxygen as prescribed when off BIPAP. Continue using your inhalers as prescribed. Rinse your mouth after using inhalers. You no longer require any steriods. Report vania increase in shortness of breath immediately to your doctor.      SECONDARY DISCHARGE DIAGNOSES  Diagnosis: End stage COPD  Assessment and Plan of Treatment: Continue BIPAP nightly as ordered. Your setting are 16/8 with FIO2 35%.  Continue using your oxygen as prescribed. Continue using inhalers as prescribed. Rinse your mouth after using inhalers. You no longer require any steroids.    Diagnosis: Pneumonia due to Pseudomonas  Assessment and Plan of Treatment: Chest xray showed bilateral loer lobe infiltrates. Sputum showed a few gram positive cocci and a rare gram negative rods. You were treated with IV antibiotics. You no longer require any antibiotics.    Diagnosis: Acute on chronic diastolic congestive heart failure  Assessment and Plan of Treatment: Chest xray on admission showed mild pulmonary edema. Continue lasix. Monitor you weight. If your weight goes up more than 3 pounds tell your doctor. Continue current medications.    Diagnosis: DM (diabetes mellitus)  Assessment and Plan of Treatment: Upon admission your A1c was greater than 11. Your insulin has been switched to 60U lantus nightly and 15U lispro pre-meal. Continue sliding scale coverage. Monitor glucose.    Diagnosis: Urinary retention  Assessment and Plan of Treatment: Mark catheter had to be reinserted on 4/8 because your failed your trial of void. You had more than 1600ml in your bladder when the catheter was reinserted on 4/8. 16Fr mark was inserted.    Diagnosis: Pulmonary HTN  Assessment and Plan of Treatment: Your echocardiogram showed that you have Moderate Pulmonary Hypertension. Continue lasix.    Diagnosis: Acute UTI  Assessment and Plan of Treatment:

## 2025-04-08 NOTE — PROGRESS NOTE ADULT - ASSESSMENT
64M from [facility], w/ PMHx of CAD, CHF, DM, CYNTHIA on nocturnal AVAPS-AE (Trilogy), COPD (multiple intubations in past on 3L of home O2), peripheral neuropathy, GERD, HTN, HLD, obesity, polyarthritis, Pulmonary HTN, TIA, BPH, depression/anxiety p/w respiratory distress from NH. Placed on BiPAP, s/p lasix 80, solumedrol 125. Admitted to ICU for AHHRF 2/2 COPD, possible CHF exacerbation, aspiration pneumonia. Intubated for increased WOB and fever. TTE resulted poor read , positive  moderate pHTN. Treated with IV lasix, diamox, steroids. Successfully extubated to 2 L NC on 4/2. Patient started on his home nocturnal BiPAP. Patient declined BiPAP. Continued on home medications trazodone, alprazolam, risperidone, atorvastatin, finasteride. Patient downgraded to AI. Blood culture negative, sputum culture negative.  4/4: Patient complain of lower back pain, started acetaminophen 1G q8h x3days, lidocaine daily PRN oxycodone. Patient pending Speech and swallow evaluation for aspiration. Found to have Candidiasis UTI treating with Fluconazole 14 days.   4/5 Failed TOV. Indwelling mark cath placed.

## 2025-04-09 ENCOUNTER — TRANSCRIPTION ENCOUNTER (OUTPATIENT)
Age: 65
End: 2025-04-09

## 2025-04-09 VITALS — HEART RATE: 109 BPM | DIASTOLIC BLOOD PRESSURE: 71 MMHG | OXYGEN SATURATION: 98 % | SYSTOLIC BLOOD PRESSURE: 123 MMHG

## 2025-04-09 LAB — GLUCOSE BLDC GLUCOMTR-MCNC: 174 MG/DL — HIGH (ref 70–99)

## 2025-04-09 PROCEDURE — 87040 BLOOD CULTURE FOR BACTERIA: CPT

## 2025-04-09 PROCEDURE — 81001 URINALYSIS AUTO W/SCOPE: CPT

## 2025-04-09 PROCEDURE — 87205 SMEAR GRAM STAIN: CPT

## 2025-04-09 PROCEDURE — 99285 EMERGENCY DEPT VISIT HI MDM: CPT

## 2025-04-09 PROCEDURE — 82570 ASSAY OF URINE CREATININE: CPT

## 2025-04-09 PROCEDURE — 36415 COLL VENOUS BLD VENIPUNCTURE: CPT

## 2025-04-09 PROCEDURE — 83605 ASSAY OF LACTIC ACID: CPT

## 2025-04-09 PROCEDURE — 87070 CULTURE OTHR SPECIMN AEROBIC: CPT

## 2025-04-09 PROCEDURE — 82436 ASSAY OF URINE CHLORIDE: CPT

## 2025-04-09 PROCEDURE — 87637 SARSCOV2&INF A&B&RSV AMP PRB: CPT

## 2025-04-09 PROCEDURE — 93306 TTE W/DOPPLER COMPLETE: CPT

## 2025-04-09 PROCEDURE — 82803 BLOOD GASES ANY COMBINATION: CPT

## 2025-04-09 PROCEDURE — 85027 COMPLETE CBC AUTOMATED: CPT

## 2025-04-09 PROCEDURE — 83036 HEMOGLOBIN GLYCOSYLATED A1C: CPT

## 2025-04-09 PROCEDURE — 84132 ASSAY OF SERUM POTASSIUM: CPT

## 2025-04-09 PROCEDURE — 84484 ASSAY OF TROPONIN QUANT: CPT

## 2025-04-09 PROCEDURE — 84100 ASSAY OF PHOSPHORUS: CPT

## 2025-04-09 PROCEDURE — 83880 ASSAY OF NATRIURETIC PEPTIDE: CPT

## 2025-04-09 PROCEDURE — 85730 THROMBOPLASTIN TIME PARTIAL: CPT

## 2025-04-09 PROCEDURE — 85025 COMPLETE CBC W/AUTO DIFF WBC: CPT

## 2025-04-09 PROCEDURE — 83735 ASSAY OF MAGNESIUM: CPT

## 2025-04-09 PROCEDURE — 87077 CULTURE AEROBIC IDENTIFY: CPT

## 2025-04-09 PROCEDURE — 82330 ASSAY OF CALCIUM: CPT

## 2025-04-09 PROCEDURE — 93005 ELECTROCARDIOGRAM TRACING: CPT

## 2025-04-09 PROCEDURE — 83935 ASSAY OF URINE OSMOLALITY: CPT

## 2025-04-09 PROCEDURE — 97530 THERAPEUTIC ACTIVITIES: CPT

## 2025-04-09 PROCEDURE — 80048 BASIC METABOLIC PNL TOTAL CA: CPT

## 2025-04-09 PROCEDURE — 87086 URINE CULTURE/COLONY COUNT: CPT

## 2025-04-09 PROCEDURE — 80053 COMPREHEN METABOLIC PANEL: CPT

## 2025-04-09 PROCEDURE — 94660 CPAP INITIATION&MGMT: CPT

## 2025-04-09 PROCEDURE — 97116 GAIT TRAINING THERAPY: CPT

## 2025-04-09 PROCEDURE — 84478 ASSAY OF TRIGLYCERIDES: CPT

## 2025-04-09 PROCEDURE — 85610 PROTHROMBIN TIME: CPT

## 2025-04-09 PROCEDURE — 84133 ASSAY OF URINE POTASSIUM: CPT

## 2025-04-09 PROCEDURE — 96375 TX/PRO/DX INJ NEW DRUG ADDON: CPT

## 2025-04-09 PROCEDURE — 96374 THER/PROPH/DIAG INJ IV PUSH: CPT

## 2025-04-09 PROCEDURE — 92610 EVALUATE SWALLOWING FUNCTION: CPT

## 2025-04-09 PROCEDURE — 87640 STAPH A DNA AMP PROBE: CPT

## 2025-04-09 PROCEDURE — 84540 ASSAY OF URINE/UREA-N: CPT

## 2025-04-09 PROCEDURE — 97162 PT EVAL MOD COMPLEX 30 MIN: CPT

## 2025-04-09 PROCEDURE — 87641 MR-STAPH DNA AMP PROBE: CPT

## 2025-04-09 PROCEDURE — 84300 ASSAY OF URINE SODIUM: CPT

## 2025-04-09 PROCEDURE — 94640 AIRWAY INHALATION TREATMENT: CPT

## 2025-04-09 PROCEDURE — 84295 ASSAY OF SERUM SODIUM: CPT

## 2025-04-09 PROCEDURE — 94003 VENT MGMT INPAT SUBQ DAY: CPT

## 2025-04-09 PROCEDURE — 84156 ASSAY OF PROTEIN URINE: CPT

## 2025-04-09 PROCEDURE — 71045 X-RAY EXAM CHEST 1 VIEW: CPT

## 2025-04-09 PROCEDURE — 82962 GLUCOSE BLOOD TEST: CPT

## 2025-04-09 PROCEDURE — 82947 ASSAY GLUCOSE BLOOD QUANT: CPT

## 2025-04-09 RX ORDER — MUPIROCIN CALCIUM 20 MG/G
1 CREAM TOPICAL EVERY 12 HOURS
Refills: 0 | Status: DISCONTINUED | OUTPATIENT
Start: 2025-04-09 | End: 2025-04-09

## 2025-04-09 RX ORDER — INSULIN LISPRO 100 U/ML
15 INJECTION, SOLUTION INTRAVENOUS; SUBCUTANEOUS
Refills: 0
Start: 2025-04-09 | End: 2025-05-08

## 2025-04-09 RX ORDER — MUPIROCIN CALCIUM 20 MG/G
1 CREAM TOPICAL
Qty: 0 | Refills: 0 | DISCHARGE
Start: 2025-04-09 | End: 2025-04-13

## 2025-04-09 RX ORDER — INSULIN LISPRO 100 U/ML
1 INJECTION, SOLUTION INTRAVENOUS; SUBCUTANEOUS
Refills: 0
Start: 2025-04-09 | End: 2025-05-08

## 2025-04-09 RX ADMIN — OXYCODONE HYDROCHLORIDE 5 MILLIGRAM(S): 30 TABLET ORAL at 07:30

## 2025-04-09 RX ADMIN — FINASTERIDE 5 MILLIGRAM(S): 1 TABLET, FILM COATED ORAL at 11:04

## 2025-04-09 RX ADMIN — OXYCODONE HYDROCHLORIDE 5 MILLIGRAM(S): 30 TABLET ORAL at 06:50

## 2025-04-09 RX ADMIN — Medication 81 MILLIGRAM(S): at 11:05

## 2025-04-09 RX ADMIN — LIDOCAINE HYDROCHLORIDE 1 PATCH: 20 JELLY TOPICAL at 11:08

## 2025-04-09 RX ADMIN — FUROSEMIDE 40 MILLIGRAM(S): 10 INJECTION INTRAMUSCULAR; INTRAVENOUS at 06:54

## 2025-04-09 RX ADMIN — Medication 40 MILLIGRAM(S): at 06:49

## 2025-04-09 RX ADMIN — Medication 200 MILLIGRAM(S): at 11:05

## 2025-04-09 RX ADMIN — Medication 1 DROP(S): at 06:55

## 2025-04-09 RX ADMIN — Medication 150 MILLIGRAM(S): at 11:05

## 2025-04-09 RX ADMIN — PREDNISONE 20 MILLIGRAM(S): 20 TABLET ORAL at 06:49

## 2025-04-09 RX ADMIN — POLYETHYLENE GLYCOL 3350 17 GRAM(S): 17 POWDER, FOR SOLUTION ORAL at 11:08

## 2025-04-09 RX ADMIN — INSULIN LISPRO 15 UNIT(S): 100 INJECTION, SOLUTION INTRAVENOUS; SUBCUTANEOUS at 08:29

## 2025-04-09 RX ADMIN — Medication 1 TABLET(S): at 06:50

## 2025-04-09 RX ADMIN — Medication 1 APPLICATION(S): at 11:07

## 2025-04-09 RX ADMIN — OXYCODONE HYDROCHLORIDE 5 MILLIGRAM(S): 30 TABLET ORAL at 11:05

## 2025-04-09 RX ADMIN — INSULIN LISPRO 1: 100 INJECTION, SOLUTION INTRAVENOUS; SUBCUTANEOUS at 08:27

## 2025-04-09 NOTE — PROGRESS NOTE ADULT - REASON FOR ADMISSION
AHRF 2/2 COPD exacerbation

## 2025-04-09 NOTE — PROGRESS NOTE ADULT - PROBLEM SELECTOR PLAN 4
A1c 11.3 upon admission  Continue lantus 15U HS  15U Lispro before meals  Continue sliding scale coverage.  Endo Dr Xie.  Monitor glucose,
Uncontrolled.  A1c 11.3 upon admission  Continue lantus 15U HS  15U Lispro before meals  Continue sliding scale coverage.  Endo consult appreciated.  Monitor glucose,
Uncontrolled.  A1c 11.3 upon admission  Continue NPH 69U before breakfast and 46U at HS  Continue sliding scale coverage.  Dr Xie called for consult.  Continue sliding scale coverage.
continue Lasix
CXR 3/30 findings of possible  mild pulmonary edema   TTE 3/30/25 findings of  Technically difficult image quality. VIRTUALLY UNINTERPRETABLE IMAGES.  TTE 10/9/23 EF 50-55% . G1DD  Continue lasix   Daily weight  Keep net negative.
CXR 3/30 findings of possible  mild pulmonary edema   TTE 3/30/25 findings of  Technically difficult image quality. VIRTUALLY UNINTERPRETABLE IMAGES.  TTE 10/9/23 EF 50-55% . G1DD  Continue lasix   Daily weight  Keep net negative.

## 2025-04-09 NOTE — PROGRESS NOTE ADULT - PROBLEM SELECTOR PLAN 9
XR findings of Mild thoracic degenerative changes  Continue acetaminophen 1gm q8h x 3 days  Continue lidocaine patch.  Oxycodone prn   Bowel regimen to prevent opioid  induced constipation

## 2025-04-09 NOTE — PROGRESS NOTE ADULT - SUBJECTIVE AND OBJECTIVE BOX
Interval Events:      Allergies    No Known Allergies    Intolerances      Endocrine/Metabolic Medications:  atorvastatin 20 milliGRAM(s) Oral at bedtime  finasteride 5 milliGRAM(s) Oral daily  insulin glargine Injectable (LANTUS) 60 Unit(s) SubCutaneous at bedtime  insulin lispro (ADMELOG) corrective regimen sliding scale   SubCutaneous three times a day before meals  insulin lispro (ADMELOG) corrective regimen sliding scale   SubCutaneous at bedtime  insulin lispro Injectable (ADMELOG) 15 Unit(s) SubCutaneous three times a day before meals  predniSONE   Tablet 20 milliGRAM(s) Oral daily      Vital Signs Last 24 Hrs  T(C): 36.6 (09 Apr 2025 05:00), Max: 36.6 (09 Apr 2025 05:00)  T(F): 97.8 (09 Apr 2025 05:00), Max: 97.8 (09 Apr 2025 05:00)  HR: 86 (09 Apr 2025 06:45) (80 - 107)  BP: 119/82 (09 Apr 2025 06:45) (105/77 - 130/69)  BP(mean): 84 (08 Apr 2025 17:25) (84 - 84)  RR: 18 (09 Apr 2025 05:00) (18 - 19)  SpO2: 100% (09 Apr 2025 05:00) (94% - 100%)    Parameters below as of 09 Apr 2025 05:00  Patient On (Oxygen Delivery Method): nasal cannula  O2 Flow (L/min): 3        PHYSICAL EXAM  All physical exam findings normal, except those marked:  General:	Alert, active, cooperative, NAD, well hydrated  .		[] Abnormal:  Neck		Normal: supple, no cervical adenopathy, no palpable thyroid  .		[] Abnormal:  Cardiovascular	Normal: regular rate, normal S1, S2, no murmurs  .		[] Abnormal:  Respiratory	Normal: no chest wall deformity, normal respiratory pattern, CTA B/L  .		[] Abnormal:  Abdominal	Normal: soft, ND, NT, bowel sounds present, no masses, no organomegaly  .		[] Abnormal:  		Normal normal genitalia, testes descended, circumcised/uncircumcised  .		Micky stage:			Breast micky:  .		Menstrual history:  .		[] Abnormal:  Extremities	Normal: FROM x4  .		[] Abnormal:  Skin		Normal: intact and not indurated, no rash, no acanthosis nigricans  .		[] Abnormal:  Neurologic	Normal: grossly intact  .		[] Abnormal:    LABS        CAPILLARY BLOOD GLUCOSE      POCT Blood Glucose.: 174 mg/dL (09 Apr 2025 07:46)  POCT Blood Glucose.: 178 mg/dL (08 Apr 2025 21:03)  POCT Blood Glucose.: 270 mg/dL (08 Apr 2025 16:51)  POCT Blood Glucose.: 278 mg/dL (08 Apr 2025 11:56)        Assesment/plan       Interval Events:  pt in nad    Allergies    No Known Allergies    Intolerances      Endocrine/Metabolic Medications:  atorvastatin 20 milliGRAM(s) Oral at bedtime  finasteride 5 milliGRAM(s) Oral daily  insulin glargine Injectable (LANTUS) 60 Unit(s) SubCutaneous at bedtime  insulin lispro (ADMELOG) corrective regimen sliding scale   SubCutaneous three times a day before meals  insulin lispro (ADMELOG) corrective regimen sliding scale   SubCutaneous at bedtime  insulin lispro Injectable (ADMELOG) 15 Unit(s) SubCutaneous three times a day before meals  predniSONE   Tablet 20 milliGRAM(s) Oral daily      Vital Signs Last 24 Hrs  T(C): 36.6 (09 Apr 2025 05:00), Max: 36.6 (09 Apr 2025 05:00)  T(F): 97.8 (09 Apr 2025 05:00), Max: 97.8 (09 Apr 2025 05:00)  HR: 86 (09 Apr 2025 06:45) (80 - 107)  BP: 119/82 (09 Apr 2025 06:45) (105/77 - 130/69)  BP(mean): 84 (08 Apr 2025 17:25) (84 - 84)  RR: 18 (09 Apr 2025 05:00) (18 - 19)  SpO2: 100% (09 Apr 2025 05:00) (94% - 100%)    Parameters below as of 09 Apr 2025 05:00  Patient On (Oxygen Delivery Method): nasal cannula  O2 Flow (L/min): 3        PHYSICAL EXAM  All physical exam findings normal, except those marked:  General:	Alert, active, cooperative, NAD, well hydrated  .		[] Abnormal:  Neck		Normal: supple, no cervical adenopathy, no palpable thyroid  .		[] Abnormal:  Cardiovascular	Normal: regular rate, normal S1, S2, no murmurs  .		[] Abnormal:  Respiratory	Normal: no chest wall deformity, normal respiratory pattern, CTA B/L  .		[] Abnormal:  Abdominal	Normal: soft, ND, NT, bowel sounds present, no masses, no organomegaly  .		[] Abnormal:  		Normal normal genitalia, testes descended, circumcised/uncircumcised  .		Micky stage:			Breast micky:  .		Menstrual history:  .		[] Abnormal:  Extremities	Normal: FROM x4  .		[] Abnormal:  Skin		Normal: intact and not indurated, no rash, no acanthosis nigricans  .		[] Abnormal:  Neurologic	Normal: grossly intact  .		[] Abnormal:    LABS        CAPILLARY BLOOD GLUCOSE      POCT Blood Glucose.: 174 mg/dL (09 Apr 2025 07:46)  POCT Blood Glucose.: 178 mg/dL (08 Apr 2025 21:03)  POCT Blood Glucose.: 270 mg/dL (08 Apr 2025 16:51)  POCT Blood Glucose.: 278 mg/dL (08 Apr 2025 11:56)        Assesment/plan    Pt is a 64M from [facility], w/ PMHx of CAD, CHF, DM, CYNTHIA on nocturnal bipap, COPD (multiple intubations in past on 3L of home O2), peripheral neuropathy, GERD, HTN, HLD, obesity, polyarthritis, Pulmonary HTN, TIA, BPH, depression/anxiety  p/w respiratory distress from NH. Endocrine consulted for dm management . Pt gets 70/30 insulin as out pt- and states he eats 2 meals for bf and needs more insulin. states he does not get hypoglycemia           Problem/Recommendation - 1:  ·  Problem: DM (diabetes mellitus).   ·  Recommendation: uncontrolled wit hyperglycemia  due to steroids and noncompliance with diet  better controlled   cont lantus 60  admelog 15 ac tid  adjust dose when steroids taper - now on prednisone   a1c-11.3  nutrtion eval  d/w prim team  fine tuning as out pt- d/c planning     Problem/Recommendation - 2:  ·  Problem: Acute respiratory failure with hypoxia and hypercapnia.   ·  Recommendation: tx per pulm  steroid taper.

## 2025-04-09 NOTE — PROGRESS NOTE ADULT - PROBLEM SELECTOR PROBLEM 2
Pneumonia due to Pseudomonas
COPD (chronic obstructive pulmonary disease)
End stage COPD
End stage COPD

## 2025-04-09 NOTE — PROGRESS NOTE ADULT - PROBLEM SELECTOR PLAN 1
Likely secondary to End stage COPD, and pna.  Continue BIPAP nightly and as needed during the day. Used BIPAP at ECC. Was on AVAPS-AE  at [facility]. and BIPAP at [facility].  Continue oxygen support. Monitor oxygen saturation.  Continue bronchodilators and ICS.  Prednisone with taper.  Continue antibiotics  Continue lasix.

## 2025-04-09 NOTE — PROGRESS NOTE ADULT - NS ATTEND AMEND GEN_ALL_CORE FT
Cont. NIV support, though intermittently non compliant   Taper steroids given uncontrolled hyperglycemia   Cont. bronchodilators   Endocrinology consult   Cont. antifungals  Failed void trial  Discharge planning
Cont. NIV support, though intermittently non compliant   Taper steroids given uncontrolled hyperglycemia   Cont. bronchodilators   Endocrinology consult   Cont. antifungals
Cont. NIV support, though intermittently non compliant   Taper steroids given uncontrolled hyperglycemia   Cont. bronchodilators   Endocrinology consult   Cont. antifungals  Failed void trial, out patient void trial at facility  Discharge planning
Problem/Plan - 1:  ·  Problem: Acute respiratory failure with hypoxia and hypercapnia.   ·  Plan: likely COPD, CHF and spiration pneumonia  continue azithromycin and ceftriaxone  continue BIPAP- increased setting I/E: 16/8 as patient obese  continue Duoneb PRN  continue Spiriva respimat   continue Advair  continue Lasix.     Problem/Plan - 2:  ·  Problem: COPD (chronic obstructive pulmonary disease).   ·  Plan: see plan as above.     Problem/Plan - 3:  ·  Problem: Acute UTI.   ·  Plan: Candida albicans UTI  started Fluconazole 200mg for 2 weeks  attempt TOV today.     Problem/Plan - 4:  ·  Problem: Pulmonary HTN.   ·  Plan: continue Lasix.     Problem/Plan - 5:  ·  Problem: CHF (congestive heart failure).   ·  Plan: continue Lasix.     Problem/Plan - 6:  ·  Problem: HLD (hyperlipidemia).   ·  Plan: continue atorvastatin.     Problem/Plan - 7:  ·  Problem: Mood disorder.   ·  Plan: continue risperidone, alprazolam, Trazadone.     Problem/Plan - 8:  ·  Problem: Back pain.  ·  Plan: chronic back pain  trial of acetaminophen 1G q8h x3 days  PRN oxycodone 2.5mg q4h moderate, 5mg q4h severe  started Lidocaine patch  bowel regimen.     Problem/Plan - 9:  ·  Problem: Advance care planning.   ·  Plan: DVT/GI PPX  BIPAP HS increased settings  pending speech and swallow.
Problem/Plan - 1:  ·  Problem: Acute respiratory failure with hypoxia and hypercapnia.   ·  Plan: due  to aspiration pneumonia , possible CHF exacerbation, End stage COPD  Continue BIPAP qhs and as needed. Settings increased : IPAP 16/EPAP 8 due to pt obese.   Continue Supplemental oxygen . Wean as tolerated to keep SpO2 >92%  Monitor oxygenation.   Continue Azithromycin, Ceftriaxone  Continue Advair, spiriva  Continue IV solumedrol 40mg daily. Transition to prednisone with taper starting  Monday.   Continue Lasix.     Problem/Plan - 2:  ·  Problem: End stage COPD.   ·  Plan: Plan as above #1.     Problem/Plan - 3:  ·  Problem: Pneumonia, aspiration.   ·  Plan: CXR findings of bilat lower lobe infiltrates  Sputum growing few gram positive cocci, rare Gram neg rods.   Continue azithromycin , ceftriaxone  S/S evaluated, recs appreciated. Diet changed to Minced and moist, mildly thickened liquids.   Aspiration precautions.   Keep of HOB elevated during and at least 1 hr after meals.     Problem/Plan - 4:  ·  Problem: Acute on chronic diastolic congestive heart failure.   ·  Plan: CXR 3/30 findings of possible  mild pulmonary edema   TTE 3/30/25 findings of  Technically difficult image quality. VIRTUALLY UNINTERPRETABLE IMAGES.  TTE 10/9/23 EF 50-55% . G1DD  Continue lasix   Daily weight  Keep net negative.     Problem/Plan - 5:  ·  Problem: Pulmonary HTN.   ·  Plan: Continue lasix.     Problem/Plan - 6:  ·  Problem: Acute UTI.   ·  Plan: UC growing Candida albicans  Continue fluconazole 200mg po daily x 14 days (4/4-4/17).
Problem/Plan - 1:  ·  Problem: Acute respiratory failure with hypoxia and hypercapnia.   ·  Plan: due  to aspiration pneumonia , possible CHF exacerbation, End stage COPD  Continue BIPAP qhs and as needed. Settings increased : IPAP 16/EPAP 8 due to pt obese.   Continue Supplemental oxygen . Wean as tolerated to keep SpO2 >92%  Monitor oxygenation.   Continue Azithromycin, Ceftriaxone  Continue Advair, spiriva  Continue IV solumedrol 40mg daily. Transition to prednisone with taper starting  Monday.   Continue Lasix.     Problem/Plan - 2:  ·  Problem: End stage COPD.   ·  Plan: Plan as above #1.     Problem/Plan - 3:  ·  Problem: Pneumonia, aspiration.   ·  Plan: CXR findings of bilat lower lobe infiltrates  Sputum growing few gram positive cocci, rare Gram neg rods.   Last doses Azithromycin and Ceftriaxone to complete 5 day course  S/s evaluated . recs appreciated. Will switch back to soft and bite sized as pt is not eating minced and moist. Encouraged pt to maintain sitting position during and at least 1 hr after eating.   Aspiration precautions.     Problem/Plan - 4:  ·  Problem: Acute on chronic diastolic congestive heart failure.   ·  Plan: CXR 3/30 findings of possible  mild pulmonary edema   TTE 3/30/25 findings of  Technically difficult image quality. VIRTUALLY UNINTERPRETABLE IMAGES.  TTE 10/9/23 EF 50-55% . G1DD  Continue lasix   Daily weight  Keep net negative.     Problem/Plan - 5:  ·  Problem: Pulmonary HTN.   ·  Plan: Continue lasix.     Problem/Plan - 6:  ·  Problem: Acute UTI.   ·  Plan: UC growing Candida albicans  Continue fluconazole 200mg po daily x 14 days (4/4-4/17).     Problem/Plan - 7:  ·  Problem: Urinary retention.   ·  Plan: Failed TOV  Indwelling mark catheter re-inserted 4/5  Continue tamsulosin  On finasteride.     Problem/Plan - 8:  ·  Problem: HLD (hyperlipidemia).   ·  Plan: Continue atorvastatin.     Problem/Plan - 9:  ·  Problem: DM (diabetes mellitus).   ·  Plan: A1C 11.3  Uncontrolled with peaks to 396 at bedtime and episode of POCT glucose 440-470 pre-lunch today. Pt ate 2 trays of breakfast. Give additional 6 units of Admelog for total of 12 units.   Continue NPH , increase to 69 units qam,  Continue  46 units qHS  Continue glucose monitoring ac/HS and coverage with Admelog s/s.  Endo Consult.

## 2025-04-09 NOTE — PROGRESS NOTE ADULT - PROBLEM SELECTOR PLAN 10
DVT and GI prophylaxis  Encourage BIPAP usage at night.  Return to ECC when cleared.  TOV ordered.  Overall prognosis guarded.  Remains FULL CODE.
Continue risperdal, trazodone  Supportive measures
Continue risperdal, trazodone  Supportive measures.

## 2025-04-09 NOTE — PROGRESS NOTE ADULT - ASSESSMENT
64M from [facility], w/ PMHx of CAD, CHF, DM, CYNTHIA on nocturnal AVAPS-AE (Trilogy), COPD (multiple intubations in past on 3L of home O2), peripheral neuropathy, GERD, HTN, HLD, obesity, polyarthritis, Pulmonary HTN, TIA, BPH, depression/anxiety p/w respiratory distress from NH. Placed on BiPAP, s/p lasix 80, solumedrol 125. Admitted to ICU for AHHRF 2/2 COPD, possible CHF exacerbation, aspiration pneumonia. Intubated for increased WOB and fever. TTE resulted poor read , positive  moderate pHTN. Treated with IV lasix, diamox, steroids. Successfully extubated to 2 L NC on 4/2. Patient started on his home nocturnal BiPAP. Patient declined BiPAP. Continued on home medications trazodone, alprazolam, risperidone, atorvastatin, finasteride. Patient downgraded to AI. Blood culture negative, sputum culture negative.  4/4: Patient complain of lower back pain, started acetaminophen 1G q8h x3days, lidocaine daily PRN oxycodone. Patient pending Speech and swallow evaluation for aspiration. Found to have Candidiasis UTI treating with Fluconazole 14 days.   4/5 Failed TOV. Indwelling mark cath placed.

## 2025-04-09 NOTE — DISCHARGE NOTE NURSING/CASE MANAGEMENT/SOCIAL WORK - PATIENT PORTAL LINK FT
You can access the FollowMyHealth Patient Portal offered by [facility] by registering at the following website: [email or web address]. By joining [facility]’s FollowMyHealth portal, you will also be able to view your health information using other applications (apps) compatible with our system.

## 2025-04-09 NOTE — PROGRESS NOTE ADULT - SUBJECTIVE AND OBJECTIVE BOX
Patient is a 64y old  Male who presents with a chief complaint of AHRF 2/2 COPD exacerbation (09 Apr 2025 09:38)    PATIENT IS SEEN AND EXAMINED IN SCU.      ALLERGIES:  No Known Allergies      VITALS:    Vital Signs Last 24 Hrs  T(C): 36.6 (09 Apr 2025 05:00), Max: 36.6 (09 Apr 2025 05:00)  T(F): 97.8 (09 Apr 2025 05:00), Max: 97.8 (09 Apr 2025 05:00)  HR: 86 (09 Apr 2025 06:45) (80 - 107)  BP: 119/82 (09 Apr 2025 06:45) (105/77 - 130/69)  BP(mean): 84 (08 Apr 2025 17:25) (84 - 84)  RR: 18 (09 Apr 2025 05:00) (18 - 19)  SpO2: 100% (09 Apr 2025 05:00) (94% - 100%)    Parameters below as of 09 Apr 2025 05:00  Patient On (Oxygen Delivery Method): nasal cannula  O2 Flow (L/min): 3      LABS:    CBC Full  -  ( 08 Apr 2025 06:25 )  WBC Count : 13.19 K/uL  RBC Count : 4.50 M/uL  Hemoglobin : 12.5 g/dL  Hematocrit : 38.7 %  Platelet Count - Automated : 224 K/uL  Mean Cell Volume : 86.0 fl  Mean Cell Hemoglobin : 27.8 pg  Mean Cell Hemoglobin Concentration : 32.3 g/dL  Auto Neutrophil # : x  Auto Lymphocyte # : x  Auto Monocyte # : x  Auto Eosinophil # : x  Auto Basophil # : x  Auto Neutrophil % : x  Auto Lymphocyte % : x  Auto Monocyte % : x  Auto Eosinophil % : x  Auto Basophil % : x      04-08    134[L]  |  100  |  20[H]  ----------------------------<  206[H]  4.0   |  31  |  0.80    Ca    8.6      08 Apr 2025 06:25  Phos  2.9     04-08  Mg     2.0     04-08    TPro  6.4  /  Alb  2.8[L]  /  TBili  0.3  /  DBili  x   /  AST  18  /  ALT  37  /  AlkPhos  56  04-08    CAPILLARY BLOOD GLUCOSE      POCT Blood Glucose.: 174 mg/dL (09 Apr 2025 07:46)  POCT Blood Glucose.: 178 mg/dL (08 Apr 2025 21:03)  POCT Blood Glucose.: 270 mg/dL (08 Apr 2025 16:51)  POCT Blood Glucose.: 278 mg/dL (08 Apr 2025 11:56)        LIVER FUNCTIONS - ( 08 Apr 2025 06:25 )  Alb: 2.8 g/dL / Pro: 6.4 g/dL / ALK PHOS: 56 U/L / ALT: 37 U/L DA / AST: 18 U/L / GGT: x           Creatinine Trend: 0.80<--, 0.78<--, 0.73<--, 0.67<--, 0.62<--, 0.73<--  I&O's Summary    08 Apr 2025 07:01  -  09 Apr 2025 07:00  --------------------------------------------------------  IN: 0 mL / OUT: 4000 mL / NET: -4000 mL            ET Tube ET Tube  03-30 @ 19:57   Commensal baldomero consistent with body site  --    Rare polymorphonuclear leukocytes per low power field  Rare Squamous epithelial cells per low power field  Few Gram positive cocci in pairs seen per oil power field  Rare Gram Negative Rods seen per oil power field      Catheterized  03-29 @ 15:40   >100,000 CFU/ml Candida albicans "Susceptibilities not performed"  --  --      Blood Blood-Peripheral  03-29 @ 12:10   No growth at 5 days  --  --      Blood Blood-Peripheral  03-29 @ 12:00   No growth at 5 days  --  --          MEDICATIONS:    MEDICATIONS  (STANDING):  artificial  tears Solution 1 Drop(s) Both EYES two times a day  aspirin  chewable 81 milliGRAM(s) Oral daily  atorvastatin 20 milliGRAM(s) Oral at bedtime  chlorhexidine 2% Cloths 1 Application(s) Topical daily  enoxaparin Injectable 40 milliGRAM(s) SubCutaneous every 24 hours  finasteride 5 milliGRAM(s) Oral daily  fluconAZOLE   Tablet 200 milliGRAM(s) Oral daily  fluticasone propionate/ salmeterol 250-50 MICROgram(s) Diskus 1 Dose(s) Inhalation two times a day  furosemide    Tablet 40 milliGRAM(s) Oral daily  insulin glargine Injectable (LANTUS) 60 Unit(s) SubCutaneous at bedtime  insulin lispro (ADMELOG) corrective regimen sliding scale   SubCutaneous three times a day before meals  insulin lispro (ADMELOG) corrective regimen sliding scale   SubCutaneous at bedtime  insulin lispro Injectable (ADMELOG) 15 Unit(s) SubCutaneous three times a day before meals  latanoprost 0.005% Ophthalmic Solution 1 Drop(s) Both EYES at bedtime  lidocaine   4% Patch 1 Patch Transdermal daily  mupirocin 2% Ointment 1 Application(s) Topical every 12 hours  nicotine -  14 mG/24Hr(s) Patch 1 Patch Transdermal daily  pantoprazole    Tablet 40 milliGRAM(s) Oral before breakfast  polyethylene glycol 3350 17 Gram(s) Oral daily  potassium phosphate / sodium phosphate Tablet (K-PHOS No. 2) 1 Tablet(s) Oral two times a day  predniSONE   Tablet 20 milliGRAM(s) Oral daily  risperiDONE   Tablet 0.25 milliGRAM(s) Oral at bedtime  senna 2 Tablet(s) Oral at bedtime  tamsulosin 0.4 milliGRAM(s) Oral at bedtime  tiotropium 2.5 MICROgram(s) Inhaler 2 Puff(s) Inhalation daily  traZODone 150 milliGRAM(s) Oral daily      MEDICATIONS  (PRN):  albuterol/ipratropium for Nebulization 3 milliLiter(s) Nebulizer every 6 hours PRN Shortness of Breath and/or Wheezing  ALPRAZolam 0.5 milliGRAM(s) Oral three times a day PRN anxiety  oxyCODONE    IR 2.5 milliGRAM(s) Oral every 4 hours PRN Moderate Pain (4 - 6)  oxyCODONE    IR 5 milliGRAM(s) Oral every 4 hours PRN Severe Pain (7 - 10)      REVIEW OF SYSTEMS:                           ALL ROS DONE [ X   ]    CONSTITUTIONAL:  LETHARGIC [   ], FEVER [   ], UNRESPONSIVE [   ]  CVS:  CP  [   ], SOB, [   ], PALPITATIONS [   ], DIZZYNESS [   ]  RS: COUGH [   ], SPUTUM [   ]  GI: ABDOMINAL PAIN [   ], NAUSEA [   ], VOMITINGS [   ], DIARRHEA [   ], CONSTIPATION [   ]  :  DYSURIA [   ], NOCTURIA [   ], INCREASED FREQUENCY [   ], DRIBLING [   ],  SKELETAL: PAINFUL JOINTS [   ], SWOLLEN JOINTS [   ], NECK ACHE [   ], LOW BACK ACHE [   ],  SKIN : ULCERS [   ], RASH [   ], ITCHING [   ]  CNS: HEAD ACHE [   ], DOUBLE VISION [   ], BLURRED VISION [   ], AMS / CONFUSION [   ], SEIZURES [   ], WEAKNESS [   ],TINGLING / NUMBNESS [   ]    PHYSICAL EXAMINATION:  GENERAL APPEARANCE: NO DISTRESS  HEENT:  NO PALLOR, NO  JVD,  NO   NODES, NECK SUPPLE  CVS: S1 +, S2 +,   RS: AEEB,  OCCASIONAL  RALES +,   NO RONCHI  ABD: SOFT, NT, NO, BS +  EXT: NO PE  SKIN: WARM,   SKELETAL:  ROM ACCEPTABLE  CNS:  AAO X 3      RADIOLOGY :  RADIOLOGY AND READINGS REVIEWED      ASSESSMENT :   PLAN:  HPI:  Pt is a 64M from [facility], w/ PMHx of CAD, CHF, DM, CYNTHIA on nocturnal bipap, COPD (multiple intubations in past on 3L of home O2), peripheral neuropathy, GERD, HTN, HLD, obesity, polyarthritis, Pulmonary HTN, TIA, BPH, depression/anxiety  p/w respiratory distress from NH. Pt mentating, A&Ox2-3, states that he has been having a productive cough, chest tightness, abdominal pain/gas. Otherwise pt appears mildly confused and anxious, unable to participate in full history taking.     Vitals in the ED: 98.1F, 118 HR, 126/72, 96% on BiPAP 18/8  limited RVP neg   Labs sig for: trop 145, proBNP 11,214, BUN/Cr 35/1.4, WC 15.42   ABG 7.21/89/53/36 > 7.23/83/66/35   EKG: sinus tachycardia, RBBB  CXR shows no consolidation, congestion, effusions pending official read     TTE from 10/2023- EF 50-55%, G1DD      Last admission [facility] 1/2024 for  increased agitation and paranoia at NH, treated for UTI with 3d CTX   (29 Mar 2025 15:08)    # PROGNOSIS IS GUARDED    # ACUTE ON CHRONIC HYPOXIC HYPERCAPNEIC RESPIRATORY FAILURE LIKELY SECONDARY TO WORSENING COPD AND PNA + PULMONOLOGY CONSULT  # SEPSIS S/T PNA  - PLACED ON ROCEPHIN + AZITHROMYCIN, F/U BCX  - STEROID TAPER  - PLACED ON BIPAP -- PATIENT IS NONCOMPLIANT. TEAM HAS COUNSELLED PATIENT AND FAMILY  - S/P MECHANICAL VENTILATION  - BRONCHODILATORS  - SYMBICORT  - CRITICAL CARE CONSULT    # DM, UNCONTROLLED  # HBA1C - 11.3  - LANTUS, TID INSULIN  - SSI + FS  - ENDOCRINOLOGY CONSULT    # ACUTE ON CHRONIC DIASTOLIC CONGESTIVE HEART FAILURE  - TTE - TECHNICALLY LIMITED  - LASIX    # ACUTE CYSTITIS  - FLUCONAZOLE, UCX - C. ALBICANS    # URINARY RETENTION  # BPH  - FLOMAX, FINASTERIDE  - FAILED TOV  - AMOR PLACED    # CHRONIC BACK PAIN  - PRN PAIN CONTROL    # HTN  # HLD  # HX OF CAD  # GERD  # GI AND DVT PPX

## 2025-04-09 NOTE — PROGRESS NOTE ADULT - PROBLEM SELECTOR PLAN 6
CXR 3/30 findings of possible  mild pulmonary edema   TTE 3/30/25 findings of  Technically difficult image quality.   TTE 10/9/23 EF 50-55% . G1DD  Continue lasix   Daily weight  Keep net negative.
CXR 3/30 findings of possible  mild pulmonary edema   TTE 3/30/25 findings of  Technically difficult image quality.   TTE 10/9/23 EF 50-55% . G1DD  Continue lasix   Daily weight  Keep net negative.
continue atorvastatin
UC growing Candida albicans  Continue fluconazole 200mg po daily x 14 days (4/4-4/17).
UC growing Candida albicans  Continue fluconazole 200mg po daily x 14 days (4/4-4/17)
Uncontrolled.  A1c 11.3 upon admission  Continue lantus 15U HS  15U Lispro before meals  Continue sliding scale coverage.  Endo consult appreciated.  Monitor glucose,

## 2025-04-09 NOTE — PROGRESS NOTE ADULT - PROBLEM SELECTOR PROBLEM 4
DM (diabetes mellitus)
Acute on chronic diastolic congestive heart failure
DM (diabetes mellitus)
Pulmonary HTN
Acute on chronic diastolic congestive heart failure
Acute on chronic diastolic congestive heart failure

## 2025-04-09 NOTE — PROGRESS NOTE ADULT - PROBLEM SELECTOR PLAN 8
UC growing Candida albicans  Continue fluconazole 200mg po daily x 14 days (4/4-4/17).
XR findings of Mild thoracic degenerative changes  Continue acetaminophen 1gm q8h x 3 days  Continue lidocaine patch.  Oxycodone prn   Bowel regimen to prevent opioid  induced constipation.
chronic back pain  trial of acetaminophen 1G q8h x3 days  PRN oxycodone 2.5mg q4h moderate, 5mg q4h severe  started Lidocaine patch  bowel regimen
Continue atorvastatin
XR findings of Mild thoracic degenerative changes  Continue acetaminophen 1gm q8h x 3 days  Continue lidocaine patch.  Oxycodone prn   Bowel regimen to prevent opioid  induced constipation.
Continue atorvastatin.

## 2025-04-09 NOTE — DISCHARGE NOTE NURSING/CASE MANAGEMENT/SOCIAL WORK - FINANCIAL ASSISTANCE
[facility] provides services at a reduced cost to those who are determined to be eligible through [facility]’s financial assistance program. Information regarding [facility]’s financial assistance program can be found by going to [email or web address] or by calling [phone].

## 2025-04-09 NOTE — PROGRESS NOTE ADULT - PROBLEM SELECTOR PROBLEM 5
Pulmonary HTN
Pulmonary HTN
CHF (congestive heart failure)
Pulmonary HTN

## 2025-04-09 NOTE — PROGRESS NOTE ADULT - PROBLEM SELECTOR PROBLEM 9
Back pain
DM (diabetes mellitus)
Advance care planning
DM (diabetes mellitus)
Advance care planning
Advance care planning

## 2025-04-09 NOTE — PROGRESS NOTE ADULT - NS ATTEND OPT1 GEN_ALL_CORE
I independently performed the documented:
I attest my time as attending is greater than 50% of the total combined time spent on qualifying patient care activities by the PA/NP and attending.
I independently performed the documented:
I independently performed the documented:
I attest my time as attending is greater than 50% of the total combined time spent on qualifying patient care activities by the PA/NP and attending.
I attest my time as attending is greater than 50% of the total combined time spent on qualifying patient care activities by the PA/NP and attending.

## 2025-04-09 NOTE — PROGRESS NOTE ADULT - PROBLEM SELECTOR PROBLEM 6
DM (diabetes mellitus)
HLD (hyperlipidemia)
Acute on chronic diastolic congestive heart failure
Acute on chronic diastolic congestive heart failure
Acute UTI
Acute UTI

## 2025-04-09 NOTE — PROGRESS NOTE ADULT - PROBLEM SELECTOR PLAN 5
Estimated Pulmonary artery pressure 53. Moderate PHTN  Continue lasix
Continue lasix
Continue lasix
Estimated Pulmonary artery pressure 53. Moderate PHTN  Continue lasix.
Estimated Pulmonary artery pressure 53. Moderate PHTN  Continue lasix.
continue Lasix

## 2025-04-09 NOTE — PROGRESS NOTE ADULT - PROBLEM SELECTOR PLAN 3
COPD exacerbation.  End stage COPD GOLD 4D  Continue BIPAP nightly and as needed during the day.  Continue oxygen support.  Monitor oxygen saturation  Bronchodilators and ICS  Prednisone with taper.
End stage COPD GOLD 4D  Continue BIPAP nightly and as needed during the day.  Continue oxygen support.  Monitor oxygen saturation  Bronchodilators and ICS  Prednisone with taper.
COPD exacerbation.  End stage COPD GOLD 4D  Continue BIPAP nightly and as needed during the day.  Continue oxygen support.  Monitor oxygen saturation  Bronchodilators and ICS  D/C solumedrol and start prednisone with taper.
Candida albicans UTI  started Fluconazole 200mg for 2 weeks  attempt TOV today
CXR findings of bilat lower lobe infiltrates  Sputum growing few gram positive cocci, rare Gram neg rods.   Last doses Azithromycin and Ceftriaxone to complete 5 day course  S/s evaluated . recs appreciated. Will switch back to soft and bite sized as pt is not eating minced and moist. Encouraged pt to maintain sitting position during and at least 1 hr after eating.   Aspiration precautions
CXR findings of bilat lower lobe infiltrates  Sputum growing few gram positive cocci, rare Gram neg rods.   Continue azithromycin , ceftriaxone  S/S evaluated, recs appreciated. Diet changed to Minced and moist, mildly thickened liquids.   Aspiration precautions.   Keep of HOB elevated during and at least 1 hr after meals

## 2025-04-09 NOTE — PROGRESS NOTE ADULT - SUBJECTIVE AND OBJECTIVE BOX
DEVIKA CARBALLO    SCU progress note    INTERVAL HPI/OVERNIGHT EVENTS: ***No overnight events. Failed TOV yesterday.    DNR [ ]   DNI  [  ]   FULL CODE    Covid - 19 PCR:     The 4Ms    What Matters Most: see GOC  Age appropriate Medications/Screen for High Risk Medication: Yes  Mentation: see CAM below  Mobility: defer to physical exam    The Confusion Assessment Method (CAM) Diagnostic Algorithm     1: Acute Onset or Fluctuating Course  - Is there evidence of an acute change in mental status from the patient’s baseline? Did the (abnormal) behavior  fluctuate during the day, that is, tend to come and go, or increase and decrease in severity?  [ ] YES [x ] NO     2: Inattention  - Did the patient have difficulty focusing attention, being easily distractible, or having difficulty keeping track of what was being said?  [ ] YES [x ] NO     3: Disorganized thinking  -Was the patient’s thinking disorganized or incoherent, such as rambling or irrelevant conversation, unclear or illogical flow of ideas, or unpredictable switching from subject to subject?  [ ] YES [x ] NO    4: Altered Level of consciousness?  [ ] YES [x ] NO    The diagnosis of delirium by CAM requires the presence of features 1 and 2 and either 3 or 4.    PRESSORS: [ ] YES [x ] NO  fluconAZOLE   Tablet 200 milliGRAM(s) Oral daily    Cardiovascular:  Heart Failure  Acute   Acute on Chronic  Chronic       furosemide    Tablet 40 milliGRAM(s) Oral daily    Pulmonary:  albuterol/ipratropium for Nebulization 3 milliLiter(s) Nebulizer every 6 hours PRN  fluticasone propionate/ salmeterol 250-50 MICROgram(s) Diskus 1 Dose(s) Inhalation two times a day  tiotropium 2.5 MICROgram(s) Inhaler 2 Puff(s) Inhalation daily    Hematalogic:  aspirin  chewable 81 milliGRAM(s) Oral daily  enoxaparin Injectable 40 milliGRAM(s) SubCutaneous every 24 hours    Other:  ALPRAZolam 0.5 milliGRAM(s) Oral three times a day PRN  artificial  tears Solution 1 Drop(s) Both EYES two times a day  atorvastatin 20 milliGRAM(s) Oral at bedtime  chlorhexidine 2% Cloths 1 Application(s) Topical daily  finasteride 5 milliGRAM(s) Oral daily  insulin glargine Injectable (LANTUS) 60 Unit(s) SubCutaneous at bedtime  insulin lispro (ADMELOG) corrective regimen sliding scale   SubCutaneous three times a day before meals  insulin lispro (ADMELOG) corrective regimen sliding scale   SubCutaneous at bedtime  insulin lispro Injectable (ADMELOG) 15 Unit(s) SubCutaneous three times a day before meals  latanoprost 0.005% Ophthalmic Solution 1 Drop(s) Both EYES at bedtime  lidocaine   4% Patch 1 Patch Transdermal daily  mupirocin 2% Ointment 1 Application(s) Topical every 12 hours  nicotine -  14 mG/24Hr(s) Patch 1 Patch Transdermal daily  oxyCODONE    IR 2.5 milliGRAM(s) Oral every 4 hours PRN  oxyCODONE    IR 5 milliGRAM(s) Oral every 4 hours PRN  pantoprazole    Tablet 40 milliGRAM(s) Oral before breakfast  polyethylene glycol 3350 17 Gram(s) Oral daily  potassium phosphate / sodium phosphate Tablet (K-PHOS No. 2) 1 Tablet(s) Oral two times a day  predniSONE   Tablet 20 milliGRAM(s) Oral daily  risperiDONE   Tablet 0.25 milliGRAM(s) Oral at bedtime  senna 2 Tablet(s) Oral at bedtime  tamsulosin 0.4 milliGRAM(s) Oral at bedtime  traZODone 150 milliGRAM(s) Oral daily    albuterol/ipratropium for Nebulization 3 milliLiter(s) Nebulizer every 6 hours PRN  ALPRAZolam 0.5 milliGRAM(s) Oral three times a day PRN  artificial  tears Solution 1 Drop(s) Both EYES two times a day  aspirin  chewable 81 milliGRAM(s) Oral daily  atorvastatin 20 milliGRAM(s) Oral at bedtime  chlorhexidine 2% Cloths 1 Application(s) Topical daily  enoxaparin Injectable 40 milliGRAM(s) SubCutaneous every 24 hours  finasteride 5 milliGRAM(s) Oral daily  fluconAZOLE   Tablet 200 milliGRAM(s) Oral daily  fluticasone propionate/ salmeterol 250-50 MICROgram(s) Diskus 1 Dose(s) Inhalation two times a day  furosemide    Tablet 40 milliGRAM(s) Oral daily  insulin glargine Injectable (LANTUS) 60 Unit(s) SubCutaneous at bedtime  insulin lispro (ADMELOG) corrective regimen sliding scale   SubCutaneous three times a day before meals  insulin lispro (ADMELOG) corrective regimen sliding scale   SubCutaneous at bedtime  insulin lispro Injectable (ADMELOG) 15 Unit(s) SubCutaneous three times a day before meals  latanoprost 0.005% Ophthalmic Solution 1 Drop(s) Both EYES at bedtime  lidocaine   4% Patch 1 Patch Transdermal daily  mupirocin 2% Ointment 1 Application(s) Topical every 12 hours  nicotine -  14 mG/24Hr(s) Patch 1 Patch Transdermal daily  oxyCODONE    IR 2.5 milliGRAM(s) Oral every 4 hours PRN  oxyCODONE    IR 5 milliGRAM(s) Oral every 4 hours PRN  pantoprazole    Tablet 40 milliGRAM(s) Oral before breakfast  polyethylene glycol 3350 17 Gram(s) Oral daily  potassium phosphate / sodium phosphate Tablet (K-PHOS No. 2) 1 Tablet(s) Oral two times a day  predniSONE   Tablet 20 milliGRAM(s) Oral daily  risperiDONE   Tablet 0.25 milliGRAM(s) Oral at bedtime  senna 2 Tablet(s) Oral at bedtime  tamsulosin 0.4 milliGRAM(s) Oral at bedtime  tiotropium 2.5 MICROgram(s) Inhaler 2 Puff(s) Inhalation daily  traZODone 150 milliGRAM(s) Oral daily    Drug Dosing Weight  Height (cm): 172.7 (29 Mar 2025 16:46)  Weight (kg): 96.3 (29 Mar 2025 16:46)  BMI (kg/m2): 32.3 (29 Mar 2025 16:46)  BSA (m2): 2.1 (29 Mar 2025 16:46)    CENTRAL LINE: [ ] YES [x ] NO  LOCATION:   DATE INSERTED:  REMOVE: [ ] YES [ ] NO  EXPLAIN:    AMOR: [x ] YES [ ] NO    DATE INSERTED:  4/8/25  REMOVE:  [ ] YES [x ] NO  EXPLAIN:   failed TOV X 2    PAST MEDICAL & SURGICAL HISTORY:  COPD (chronic obstructive pulmonary disease)  Oxygen 2-3L at home      Stented coronary artery  2017      HLD (hyperlipidemia)      Pulmonary HTN      Type 2 diabetes mellitus without complication, with long-term current use of insulin      DM (diabetes mellitus)      CAD (coronary artery disease)      GERD (gastroesophageal reflux disease)      Insomnia      CHF (congestive heart failure)      HTN (hypertension)      Mood disorder      COPD (chronic obstructive pulmonary disease)      HTN (hypertension)      DM (diabetes mellitus)      CAD (coronary artery disease)      Bipolar disorder      CYNTHIA treated with BiPAP      No significant past surgical history                  04-08 @ 07:01  -  04-09 @ 07:00  --------------------------------------------------------  IN: 0 mL / OUT: 4000 mL / NET: -4000 mL            PHYSICAL EXAM:    GENERAL: NAD, well-groomed, well-developed  HEAD:  Atraumatic, Normocephalic  EYES: EOMI, PERRLA, conjunctiva and sclera clear  ENMT: No tonsillar erythema, exudates  NECK: Supple, No JVD  NERVOUS SYSTEM:  Alert & Oriented X3, Follows commands, moving all extremities.  CHEST/LUNG: Diminished breath sounds bilaterally. Few scattered rhonchi noted.  HEART: Regular rate and rhythm; No murmurs, rubs, or gallops  ABDOMEN: Soft, Obese. Nontender, Nondistended; Bowel sounds present  EXTREMITIES:  2+ Peripheral Pulses, No clubbing, cyanosis, or edema  LYMPH: No lymphadenopathy noted  SKIN: No rashes or lesions      LABS:  CBC Full  -  ( 08 Apr 2025 06:25 )  WBC Count : 13.19 K/uL  RBC Count : 4.50 M/uL  Hemoglobin : 12.5 g/dL  Hematocrit : 38.7 %  Platelet Count - Automated : 224 K/uL  Mean Cell Volume : 86.0 fl  Mean Cell Hemoglobin : 27.8 pg  Mean Cell Hemoglobin Concentration : 32.3 g/dL  Auto Neutrophil # : x  Auto Lymphocyte # : x  Auto Monocyte # : x  Auto Eosinophil # : x  Auto Basophil # : x  Auto Neutrophil % : x  Auto Lymphocyte % : x  Auto Monocyte % : x  Auto Eosinophil % : x  Auto Basophil % : x    04-08    134[L]  |  100  |  20[H]  ----------------------------<  206[H]  4.0   |  31  |  0.80    Ca    8.6      08 Apr 2025 06:25  Phos  2.9     04-08  Mg     2.0     04-08    TPro  6.4  /  Alb  2.8[L]  /  TBili  0.3  /  DBili  x   /  AST  18  /  ALT  37  /  AlkPhos  56  04-08      Urinalysis Basic - ( 08 Apr 2025 06:25 )    Color: x / Appearance: x / SG: x / pH: x  Gluc: 206 mg/dL / Ketone: x  / Bili: x / Urobili: x   Blood: x / Protein: x / Nitrite: x   Leuk Esterase: x / RBC: x / WBC x   Sq Epi: x / Non Sq Epi: x / Bacteria: x            [  ]  DVT Prophylaxis  [  ]  Nutrition, Brand, Rate         Goal Rate        Abnormal Nutritional Status -  Malnutrition   Cachexia          RADIOLOGY & ADDITIONAL STUDIES:  ***  < from: Xray Chest 1 View- PORTABLE-Urgent (Xray Chest 1 View- PORTABLE-Urgent .) (04.02.25 @ 10:15) >  Frontal expiratory view of the chest shows the heart to be similar in   size. Endotracheal tube reaches the lower trachea. Feeding tube reaches   the central stomach.    The lungs show atelectasis or infiltrate of the lower right lung with   similar prominence of the central pulmonary arteries and there is no   evidence of pneumothorax nor pleural effusion.    Chest one view 4/2/2025 9:54 AM  Compared to the prior study, there is progression of right base   infiltrate. No pneumothorax.    IMPRESSION:  Right base infiltrate. No pneumothorax.    < end of copied text >    Goals of Care Discussion with Family/Proxy/Other   - see note from 4/4/25

## 2025-04-09 NOTE — PROGRESS NOTE ADULT - PROBLEM SELECTOR PLAN 7
UC growing Candida albicans  Continue fluconazole 200mg po daily x 14 days (4/4-4/17).
UC growing Candida albicans  Continue fluconazole 200mg po daily x 14 days (4/4-4/17).
Failed TOV  Indwelling mark catheter re-inserted 4/5  Continue tamsulosin  On finasteride
Failed TOV yesterday.  16 mark reinserted.  Continue finasteride and tamsulosin.
Failed TOV  Indwelling mark catheter re-inserted 4/5  Continue tamsulosin  On finasteride.
continue risperidone, alprazolam, Trazadone

## 2025-04-09 NOTE — PROGRESS NOTE ADULT - PROBLEM SELECTOR PLAN 2
Plan as above #1
CXR findings of bilat lower lobe infiltrates  Sputum growing few gram positive cocci, rare Gram neg rods.   Azithromycin and ceftriaxone completed.  Aspiration precautions.
CXR findings of bilat lower lobe infiltrates  Sputum growing few gram positive cocci, rare Gram neg rods.   Azithromycin and ceftriaxone completed.  Aspiration precautions.
CXR findings of bilat lower lobe infiltrates  Sputum growing few gram positive cocci, rare Gram neg rods.   Last doses Azithromycin and Ceftriaxone to complete 5 day course  S/s evaluated . recs appreciated. Will switch back to soft and bite sized as pt is not eating minced and moist. Encouraged pt to maintain sitting position during and at least 1 hr after eating.   Aspiration precautions.
see plan as above
Plan as above #1

## 2025-04-09 NOTE — PROGRESS NOTE ADULT - PROVIDER SPECIALTY LIST ADULT
Critical Care
Endocrinology
Critical Care
Endocrinology
Internal Medicine
Internal Medicine
Critical Care

## 2025-04-24 NOTE — PROGRESS NOTE ADULT - SUBJECTIVE AND OBJECTIVE BOX
History:   Mohsen Morales is a 49 year old male referred to general surgery for an infected back cyst.  He was seen by Dr. Vázquez last year when he had an infected neck cyst.  He underwent an incision and drainage in March 2024.  He states it was the most painful thing he had ever experienced.  Since that time, he has now developed a painful lesion on his central right back.  It started about 1.5 weeks ago.  He developed pain and swelling.  He was seen by his PCP and he was prescribed clindamycin.  As soon as he started taking the clindamycin the cyst ruptured on its own.  It has been leaking now for the last week.  It is feeling significantly better.  It is not causing any further discomfort.  Denies any systemic symptoms.    Medications:     clindamycin (CLEOCIN) 300 MG capsule, Take 1 capsule (300 mg) by mouth 3 times daily., Disp: 12 capsule, Rfl: 0    fexofenadine (ALLEGRA) 180 MG tablet, Take 180 mg by mouth daily., Disp: , Rfl:     metFORMIN (GLUCOPHAGE XR) 500 MG 24 hr tablet, Take 2 tablets (1,000 mg) by mouth 2 times daily (with meals). Start medication slow and titrate dose up over 3-4 weeks., Disp: 360 tablet, Rfl: 3    acetaminophen (TYLENOL) 500 MG tablet, Take 1,000 mg by mouth every 6 hours as needed for mild pain (Patient not taking: Reported on 4/24/2025), Disp: , Rfl:     alcohol swab prep pads, Use to swab area of injection/enedina as directed. (Patient not taking: Reported on 4/24/2025), Disp: 100 each, Rfl: 3    blood glucose (NO BRAND SPECIFIED) test strip, Use to test blood sugar 3 times daily or as directed. To accompany: Blood Glucose Monitor Brands: per insurance. (Patient not taking: Reported on 4/24/2025), Disp: 100 strip, Rfl: 6    blood glucose monitoring (NO BRAND SPECIFIED) meter device kit, Use to test blood sugar 3 times daily or as directed. Preferred blood glucose meter OR supplies to accompany: Blood Glucose Monitor Brands: per insurance. (Patient not taking: Reported on  4/24/2025), Disp: 1 kit, Rfl: 0    blood glucose test (ONETOUCH ULTRA TEST) strips, [BLOOD GLUCOSE TEST (ONETOUCH ULTRA TEST) STRIPS] Use 1 each As Directed 6 (six) times a day. (Patient not taking: Reported on 4/24/2025), Disp: 600 each, Rfl: 3    lancets (ONETOUCH DELICA LANCETS) 30 gauge Misc, [LANCETS (ONETOUCH DELICA LANCETS) 30 GAUGE MISC] Inject 1 each under the skin 4 (four) times a day. (Patient not taking: Reported on 4/24/2025), Disp: 300 each, Rfl: 3    multivitamin therapeutic tablet, Take 1 tablet by mouth daily (Patient not taking: Reported on 4/24/2025), Disp: , Rfl:     omeprazole (PRILOSEC) 20 MG capsule, [OMEPRAZOLE (PRILOSEC) 20 MG CAPSULE] Take 1 capsule (20 mg total) by mouth daily before breakfast. (Patient not taking: Reported on 4/24/2025), Disp: 90 capsule, Rfl: 0    thin (NO BRAND SPECIFIED) lancets, Use with lanceting device. To accompany: Blood Glucose Monitor Brands: per insurance. (Patient not taking: Reported on 4/24/2025), Disp: 200 each, Rfl: 1    UNABLE TO FIND, MEDICATION NAME: Super Complex B (Patient not taking: Reported on 4/24/2025), Disp: , Rfl:     Exam:  BP (!) 142/70 (BP Location: Right arm)   Wt (!) 144.7 kg (319 lb)   BMI 42.09 kg/m    Body mass index is 42.09 kg/m .  General : Alert, cooperative, appears stated age   Skin: 8 x 9 cm area of induration and discolored red skin on the central thorax to the right of midline.  The epidermis has sloughed over this.  Findings are consistent with healing from an acutely infected sebaceous cyst    Assessment/Plan:   Mohsen Morales is a 49 year old male who is recovering from an infected sebaceous cyst.  He should complete his antibiotics.  He does not need incision and drainage since his body drained the cyst on its own.  In 2 to 3 months if he is interested in having the underlying cyst removed he can return to the general surgery clinic.  At that time we would determine whether or not it would be okay to remove in the office  CARDIOLOGY PROGRESS NOTE   ([facility])                                                                                                        Subjective: laying in bed, NAD, denied CP, "wants to go home"    Vitals:  T(C): 36.6 (10-11-18 @ 04:11), Max: 37 (10-10-18 @ 15:40)  HR: 67 (10-11-18 @ 04:39) (67 - 105)  BP: 82/58 (10-11-18 @ 04:39) (78/48 - 120/83)  RR: 23 (10-10-18 @ 22:17) (18 - 32)  SpO2: 98% (10-11-18 @ 04:39) (91% - 99%)  Wt(kg): --  I&O's Summary        PHYSICAL EXAM:  Appearance: Normal	  HEENT:   Atraumatic  Cardiovascular: Normal S1 S2, No JVD, No murmurs, No edema  Respiratory: Lungs clear to auscultation	  Gastrointestinal:  Soft, Non-tender, + BS	  Skin: No rashes, No ecchymoses, No cyanosis  Neurologic: Alert and awake, able to move extremities  Extremities: No edema          CURRENT MEDICATIONS:  lisinopril 5 milliGRAM(s) Oral daily  metoprolol tartrate 12.5 milliGRAM(s) Oral two times a day  spironolactone 12.5 milliGRAM(s) Oral daily    nystatin    Suspension 756188 Unit(s) Oral four times a day    ALBUTerol    0.083% 2.5 milliGRAM(s) Nebulizer every 4 hours PRN  ALBUTerol/ipratropium for Nebulization 3 milliLiter(s) Nebulizer every 6 hours    acetaminophen   Tablet .. 650 milliGRAM(s) Oral every 6 hours PRN    pantoprazole    Tablet 40 milliGRAM(s) Oral before breakfast    atorvastatin 40 milliGRAM(s) Oral at bedtime  dextrose 40% Gel 15 Gram(s) Oral once PRN  dextrose 50% Injectable 12.5 Gram(s) IV Push once  dextrose 50% Injectable 25 Gram(s) IV Push once  dextrose 50% Injectable 25 Gram(s) IV Push once  glucagon  Injectable 1 milliGRAM(s) IntraMuscular once PRN  insulin glargine Injectable (LANTUS) 45 Unit(s) SubCutaneous every morning  insulin lispro (HumaLOG) corrective regimen sliding scale   SubCutaneous Before meals and at bedtime  insulin lispro Injectable (HumaLOG) 6 Unit(s) SubCutaneous three times a day before meals  predniSONE   Tablet 60 milliGRAM(s) Oral daily    aspirin enteric coated 81 milliGRAM(s) Oral daily  clopidogrel Tablet 75 milliGRAM(s) Oral daily  dextrose 5%. 1000 milliLiter(s) IV Continuous <Continuous>  enoxaparin Injectable 40 milliGRAM(s) SubCutaneous daily      LABS:	 	                          14.2   10.4  )-----------( 147      ( 11 Oct 2018 07:17 )             45.5     10-11    138  |  93<L>  |  22.0<H>  ----------------------------<  256<H>  4.2   |  40.0<H>  |  0.58    Ca    9.0      11 Oct 2018 07:17  Mg     2.1     10-09 under local versus the operating room under MAC anesthesia.    22 minutes was spent in chart prep, discussion, and documentation.    Gabriela Diaz DO  General Surgeon  [facility]  Surgery [facility] - [address] [address]  Office: [phone]  Employed by - [facility]

## 2025-05-07 NOTE — ED CDU PROVIDER INITIAL DAY NOTE - NS ED ROS FT
----- Message from Hunter Jeffrey DO sent at 5/4/2025 11:09 AM CDT -----  Tsh improved, t4 just slightly low, continue current dose and recheck in 4-6 weeks  
----- Message from Hunter Jeffrey DO sent at 5/4/2025 11:09 AM CDT -----  Tsh improved, t4 just slightly low, continue current dose and recheck in 4-6 weeks  
Dragon error, repeat tsh with relfex 6 weeks  
Dragon error, repeat tsh with relfex 6 weeks  
Patient notified of Lab results per Dr. Jeffrey. Patient verbalizes understanding and has no further questions.     Patient states that she is not on any medication for her Thyroid.  Patient needs to know which medication she needs to continue.  
Writer spoke to Patient and informed her that the \"Continue Current Medication\" was an error.    Scheduled Patient for Labs on 06.18.25    Placed order for TSH w/Reflex  
No chest pain/ pleuritic pain

## 2025-06-04 NOTE — DISCHARGE NOTE PROVIDER - [id number]_GEN_ALL_CORE
Airway    Performed by: Joy Amaya CRNA  Authorized by: Luis Rincon MD    Final Airway Type:  Endotracheal airway  Final Endotracheal Airway*:  ETT  ETT Size (mm)*:  7.5  Cuff*:  Regular  Technique Used for Successful ETT Placement:  Video laryngoscopy  Devices/Methods Used in Placement*:  Mask, Soft Bite Block, Stylet and Oral ETT  Intubation Procedure*:  Preoxygenation, ETCO2, Atraumatic, Dentition Unchanged, Pharynx Clear, Cricoid Pressure and Rapid Sequence Intubation  Insertion Site:  Oral  Blade Type*:  Glidescope  Blade Size*:  4  Cuff Volume (mL):  7  Measured from*:  Lips  Secured at (cm)*:  21  Placement Verified by: auscultation and capnometry    Glottic View*:  1 - full view of glottis  Attempts*:  1   Patient Identified, Procedure confirmed, Emergency equipment available and Safety protocols followed  Location:  OR  Urgency:  Elective  Difficult Airway: No    Indications for Airway Management:  Anesthesia  Spontaneous Ventilation: absent    Sedation Level:  Anesthetized  Mask Difficulty Assessment:  0 - not attempted  Start Time: 6/4/2025 1:15 PM   RSI with cricoid pressure. Easy intubation without problems. Cricoid pressure released after cuff is up and placement verified.       This document is complete and the patient is ready for discharge.

## 2025-08-12 ENCOUNTER — INPATIENT (INPATIENT)
Facility: HOSPITAL | Age: 65
LOS: 6 days | Discharge: NURSING, REHAB OR LONG-TERM CARE | DRG: 189 | End: 2025-08-19
Attending: INTERNAL MEDICINE | Admitting: INTERNAL MEDICINE
Payer: MEDICAID

## 2025-08-12 VITALS
DIASTOLIC BLOOD PRESSURE: 101 MMHG | SYSTOLIC BLOOD PRESSURE: 145 MMHG | OXYGEN SATURATION: 97 % | RESPIRATION RATE: 26 BRPM | HEART RATE: 113 BPM

## 2025-08-12 DIAGNOSIS — J96.01 ACUTE RESPIRATORY FAILURE WITH HYPOXIA: ICD-10-CM

## 2025-08-12 LAB
ALBUMIN SERPL ELPH-MCNC: 2.6 G/DL — LOW (ref 3.5–5)
ALP SERPL-CCNC: 86 U/L — SIGNIFICANT CHANGE UP (ref 40–120)
ALT FLD-CCNC: 31 U/L DA — SIGNIFICANT CHANGE UP (ref 10–60)
ANION GAP SERPL CALC-SCNC: -1 MMOL/L — LOW (ref 5–17)
AST SERPL-CCNC: 15 U/L — SIGNIFICANT CHANGE UP (ref 10–40)
BASE EXCESS BLDA CALC-SCNC: 16.5 MMOL/L — HIGH (ref -2–3)
BASE EXCESS BLDA CALC-SCNC: 16.5 MMOL/L — HIGH (ref -2–3)
BASE EXCESS BLDA CALC-SCNC: 17.8 MMOL/L — HIGH (ref -2–3)
BASOPHILS # BLD AUTO: 0.02 K/UL — SIGNIFICANT CHANGE UP (ref 0–0.2)
BASOPHILS NFR BLD AUTO: 0.3 % — SIGNIFICANT CHANGE UP (ref 0–2)
BILIRUB SERPL-MCNC: 0.3 MG/DL — SIGNIFICANT CHANGE UP (ref 0.2–1.2)
BLOOD GAS COMMENTS ARTERIAL: SIGNIFICANT CHANGE UP
BUN SERPL-MCNC: 22 MG/DL — HIGH (ref 7–18)
CALCIUM SERPL-MCNC: 8.7 MG/DL — SIGNIFICANT CHANGE UP (ref 8.4–10.5)
CHLORIDE SERPL-SCNC: 88 MMOL/L — LOW (ref 96–108)
CO2 SERPL-SCNC: 44 MMOL/L — HIGH (ref 22–31)
CREAT SERPL-MCNC: 1.08 MG/DL — SIGNIFICANT CHANGE UP (ref 0.5–1.3)
EGFR: 77 ML/MIN/1.73M2 — SIGNIFICANT CHANGE UP
EGFR: 77 ML/MIN/1.73M2 — SIGNIFICANT CHANGE UP
EOSINOPHIL # BLD AUTO: 0.03 K/UL — SIGNIFICANT CHANGE UP (ref 0–0.5)
EOSINOPHIL NFR BLD AUTO: 0.4 % — SIGNIFICANT CHANGE UP (ref 0–6)
FLUAV AG NPH QL: SIGNIFICANT CHANGE UP
FLUBV AG NPH QL: SIGNIFICANT CHANGE UP
GLUCOSE BLDC GLUCOMTR-MCNC: 334 MG/DL — HIGH (ref 70–99)
GLUCOSE SERPL-MCNC: 322 MG/DL — HIGH (ref 70–99)
HCO3 BLDA-SCNC: 46 MMOL/L — CRITICAL HIGH (ref 21–28)
HCO3 BLDA-SCNC: 48 MMOL/L — CRITICAL HIGH (ref 21–28)
HCO3 BLDA-SCNC: 48 MMOL/L — CRITICAL HIGH (ref 21–28)
HCT VFR BLD CALC: 38.4 % — LOW (ref 39–50)
HGB BLD-MCNC: 11.1 G/DL — LOW (ref 13–17)
HOROWITZ INDEX BLDA+IHG-RTO: 60 — SIGNIFICANT CHANGE UP
IMM GRANULOCYTES NFR BLD AUTO: 1 % — HIGH (ref 0–0.9)
LACTATE SERPL-SCNC: 0.7 MMOL/L — SIGNIFICANT CHANGE UP (ref 0.7–2)
LYMPHOCYTES # BLD AUTO: 0.67 K/UL — LOW (ref 1–3.3)
LYMPHOCYTES # BLD AUTO: 8.5 % — LOW (ref 13–44)
MCHC RBC-ENTMCNC: 25.9 PG — LOW (ref 27–34)
MCHC RBC-ENTMCNC: 28.9 G/DL — LOW (ref 32–36)
MCV RBC AUTO: 89.5 FL — SIGNIFICANT CHANGE UP (ref 80–100)
MONOCYTES # BLD AUTO: 0.57 K/UL — SIGNIFICANT CHANGE UP (ref 0–0.9)
MONOCYTES NFR BLD AUTO: 7.2 % — SIGNIFICANT CHANGE UP (ref 2–14)
NEUTROPHILS # BLD AUTO: 6.52 K/UL — SIGNIFICANT CHANGE UP (ref 1.8–7.4)
NEUTROPHILS NFR BLD AUTO: 82.6 % — HIGH (ref 43–77)
NRBC BLD AUTO-RTO: 0 /100 WBCS — SIGNIFICANT CHANGE UP (ref 0–0)
NT-PROBNP SERPL-SCNC: 1323 PG/ML — HIGH (ref 0–125)
PCO2 BLDA: 107 MMHG — CRITICAL HIGH (ref 35–48)
PCO2 BLDA: 82 MMHG — CRITICAL HIGH (ref 35–48)
PCO2 BLDA: 88 MMHG — CRITICAL HIGH (ref 35–48)
PH BLDA: 7.26 — LOW (ref 7.35–7.45)
PH BLDA: 7.34 — LOW (ref 7.35–7.45)
PH BLDA: 7.36 — SIGNIFICANT CHANGE UP (ref 7.35–7.45)
PLATELET # BLD AUTO: 218 K/UL — SIGNIFICANT CHANGE UP (ref 150–400)
PO2 BLDA: 66 MMHG — LOW (ref 83–108)
PO2 BLDA: 76 MMHG — LOW (ref 83–108)
PO2 BLDA: 93 MMHG — SIGNIFICANT CHANGE UP (ref 83–108)
POTASSIUM SERPL-MCNC: 5.2 MMOL/L — SIGNIFICANT CHANGE UP (ref 3.5–5.3)
POTASSIUM SERPL-SCNC: 5.2 MMOL/L — SIGNIFICANT CHANGE UP (ref 3.5–5.3)
PROT SERPL-MCNC: 7.8 G/DL — SIGNIFICANT CHANGE UP (ref 6–8.3)
RBC # BLD: 4.29 M/UL — SIGNIFICANT CHANGE UP (ref 4.2–5.8)
RBC # FLD: 13.5 % — SIGNIFICANT CHANGE UP (ref 10.3–14.5)
RSV RNA NPH QL NAA+NON-PROBE: SIGNIFICANT CHANGE UP
SAO2 % BLDA: 93 % — SIGNIFICANT CHANGE UP
SAO2 % BLDA: 97 % — SIGNIFICANT CHANGE UP
SAO2 % BLDA: 98 % — SIGNIFICANT CHANGE UP
SARS-COV-2 RNA SPEC QL NAA+PROBE: SIGNIFICANT CHANGE UP
SODIUM SERPL-SCNC: 131 MMOL/L — LOW (ref 135–145)
SOURCE RESPIRATORY: SIGNIFICANT CHANGE UP
WBC # BLD: 7.89 K/UL — SIGNIFICANT CHANGE UP (ref 3.8–10.5)
WBC # FLD AUTO: 7.89 K/UL — SIGNIFICANT CHANGE UP (ref 3.8–10.5)

## 2025-08-12 PROCEDURE — 71250 CT THORAX DX C-: CPT

## 2025-08-12 PROCEDURE — 85025 COMPLETE CBC W/AUTO DIFF WBC: CPT

## 2025-08-12 PROCEDURE — 87040 BLOOD CULTURE FOR BACTERIA: CPT

## 2025-08-12 PROCEDURE — 94640 AIRWAY INHALATION TREATMENT: CPT

## 2025-08-12 PROCEDURE — 71045 X-RAY EXAM CHEST 1 VIEW: CPT | Mod: 26

## 2025-08-12 PROCEDURE — 99291 CRITICAL CARE FIRST HOUR: CPT

## 2025-08-12 PROCEDURE — 99291 CRITICAL CARE FIRST HOUR: CPT | Mod: GC

## 2025-08-12 PROCEDURE — 82803 BLOOD GASES ANY COMBINATION: CPT

## 2025-08-12 PROCEDURE — 82962 GLUCOSE BLOOD TEST: CPT

## 2025-08-12 PROCEDURE — 94660 CPAP INITIATION&MGMT: CPT

## 2025-08-12 PROCEDURE — 83880 ASSAY OF NATRIURETIC PEPTIDE: CPT

## 2025-08-12 PROCEDURE — 71045 X-RAY EXAM CHEST 1 VIEW: CPT

## 2025-08-12 PROCEDURE — 71250 CT THORAX DX C-: CPT | Mod: 26

## 2025-08-12 PROCEDURE — 87637 SARSCOV2&INF A&B&RSV AMP PRB: CPT

## 2025-08-12 PROCEDURE — 83605 ASSAY OF LACTIC ACID: CPT

## 2025-08-12 PROCEDURE — 36415 COLL VENOUS BLD VENIPUNCTURE: CPT

## 2025-08-12 PROCEDURE — 80053 COMPREHEN METABOLIC PANEL: CPT

## 2025-08-12 RX ORDER — HEPARIN SODIUM 1000 [USP'U]/ML
5000 INJECTION INTRAVENOUS; SUBCUTANEOUS EVERY 12 HOURS
Refills: 0 | Status: DISCONTINUED | OUTPATIENT
Start: 2025-08-12 | End: 2025-08-14

## 2025-08-12 RX ORDER — AMMONIUM LACTATE 12 %
1 LOTION (ML) TOPICAL
Refills: 0 | DISCHARGE

## 2025-08-12 RX ORDER — AZITHROMYCIN 250 MG
500 CAPSULE ORAL ONCE
Refills: 0 | Status: DISCONTINUED | OUTPATIENT
Start: 2025-08-12 | End: 2025-08-12

## 2025-08-12 RX ORDER — PIPERACILLIN-TAZO-DEXTROSE,ISO 3.375G/5
3.38 IV SOLUTION, PIGGYBACK PREMIX FROZEN(ML) INTRAVENOUS ONCE
Refills: 0 | Status: COMPLETED | OUTPATIENT
Start: 2025-08-12 | End: 2025-08-12

## 2025-08-12 RX ORDER — IPRATROPIUM BROMIDE AND ALBUTEROL SULFATE .5; 2.5 MG/3ML; MG/3ML
3 SOLUTION RESPIRATORY (INHALATION) ONCE
Refills: 0 | Status: COMPLETED | OUTPATIENT
Start: 2025-08-12 | End: 2025-08-12

## 2025-08-12 RX ORDER — PIPERACILLIN-TAZO-DEXTROSE,ISO 3.375G/5
3.38 IV SOLUTION, PIGGYBACK PREMIX FROZEN(ML) INTRAVENOUS EVERY 8 HOURS
Refills: 0 | Status: DISCONTINUED | OUTPATIENT
Start: 2025-08-13 | End: 2025-08-13

## 2025-08-12 RX ORDER — IPRATROPIUM BROMIDE AND ALBUTEROL SULFATE .5; 2.5 MG/3ML; MG/3ML
3 SOLUTION RESPIRATORY (INHALATION) EVERY 6 HOURS
Refills: 0 | Status: DISCONTINUED | OUTPATIENT
Start: 2025-08-12 | End: 2025-08-19

## 2025-08-12 RX ORDER — INSULIN ASPART 100 [IU]/ML
54 INJECTION, SUSPENSION SUBCUTANEOUS
Refills: 0 | DISCHARGE

## 2025-08-12 RX ORDER — AZITHROMYCIN 250 MG
500 CAPSULE ORAL EVERY 24 HOURS
Refills: 0 | Status: DISCONTINUED | OUTPATIENT
Start: 2025-08-13 | End: 2025-08-13

## 2025-08-12 RX ORDER — ASPIRIN 325 MG
1 TABLET ORAL
Refills: 0 | DISCHARGE

## 2025-08-12 RX ORDER — NICOTINE POLACRILEX 4 MG/1
1 GUM, CHEWING ORAL DAILY
Refills: 0 | Status: DISCONTINUED | OUTPATIENT
Start: 2025-08-12 | End: 2025-08-19

## 2025-08-12 RX ORDER — AZITHROMYCIN 250 MG
500 CAPSULE ORAL ONCE
Refills: 0 | Status: COMPLETED | OUTPATIENT
Start: 2025-08-12 | End: 2025-08-12

## 2025-08-12 RX ORDER — AZITHROMYCIN 250 MG
CAPSULE ORAL
Refills: 0 | Status: DISCONTINUED | OUTPATIENT
Start: 2025-08-12 | End: 2025-08-13

## 2025-08-12 RX ORDER — CEFTRIAXONE 500 MG/1
1000 INJECTION, POWDER, FOR SOLUTION INTRAMUSCULAR; INTRAVENOUS ONCE
Refills: 0 | Status: DISCONTINUED | OUTPATIENT
Start: 2025-08-12 | End: 2025-08-12

## 2025-08-12 RX ORDER — METHYLPREDNISOLONE ACETATE 80 MG/ML
40 INJECTION, SUSPENSION INTRA-ARTICULAR; INTRALESIONAL; INTRAMUSCULAR; SOFT TISSUE EVERY 8 HOURS
Refills: 0 | Status: DISCONTINUED | OUTPATIENT
Start: 2025-08-12 | End: 2025-08-12

## 2025-08-12 RX ORDER — ACETAMINOPHEN 500 MG/5ML
2 LIQUID (ML) ORAL
Refills: 0 | DISCHARGE

## 2025-08-12 RX ORDER — METHYLPREDNISOLONE ACETATE 80 MG/ML
125 INJECTION, SUSPENSION INTRA-ARTICULAR; INTRALESIONAL; INTRAMUSCULAR; SOFT TISSUE ONCE
Refills: 0 | Status: COMPLETED | OUTPATIENT
Start: 2025-08-12 | End: 2025-08-12

## 2025-08-12 RX ORDER — INSULIN LISPRO 100 U/ML
INJECTION, SOLUTION INTRAVENOUS; SUBCUTANEOUS EVERY 6 HOURS
Refills: 0 | Status: DISCONTINUED | OUTPATIENT
Start: 2025-08-12 | End: 2025-08-13

## 2025-08-12 RX ADMIN — INSULIN LISPRO 8: 100 INJECTION, SOLUTION INTRAVENOUS; SUBCUTANEOUS at 23:06

## 2025-08-12 RX ADMIN — Medication 250 MILLIGRAM(S): at 17:24

## 2025-08-12 RX ADMIN — IPRATROPIUM BROMIDE AND ALBUTEROL SULFATE 3 MILLILITER(S): .5; 2.5 SOLUTION RESPIRATORY (INHALATION) at 15:27

## 2025-08-12 RX ADMIN — IPRATROPIUM BROMIDE AND ALBUTEROL SULFATE 3 MILLILITER(S): .5; 2.5 SOLUTION RESPIRATORY (INHALATION) at 11:51

## 2025-08-12 RX ADMIN — Medication 1 APPLICATION(S): at 17:21

## 2025-08-12 RX ADMIN — HEPARIN SODIUM 5000 UNIT(S): 1000 INJECTION INTRAVENOUS; SUBCUTANEOUS at 17:21

## 2025-08-12 RX ADMIN — Medication 25 GRAM(S): at 21:09

## 2025-08-12 RX ADMIN — INSULIN LISPRO 8: 100 INJECTION, SOLUTION INTRAVENOUS; SUBCUTANEOUS at 17:21

## 2025-08-12 RX ADMIN — NICOTINE POLACRILEX 1 PATCH: 4 GUM, CHEWING ORAL at 18:12

## 2025-08-12 RX ADMIN — Medication 40 MILLIGRAM(S): at 17:24

## 2025-08-12 RX ADMIN — METHYLPREDNISOLONE ACETATE 125 MILLIGRAM(S): 80 INJECTION, SUSPENSION INTRA-ARTICULAR; INTRALESIONAL; INTRAMUSCULAR; SOFT TISSUE at 11:52

## 2025-08-12 RX ADMIN — Medication 200 GRAM(S): at 17:23

## 2025-08-12 RX ADMIN — NICOTINE POLACRILEX 1 PATCH: 4 GUM, CHEWING ORAL at 20:58

## 2025-08-12 RX ADMIN — IPRATROPIUM BROMIDE AND ALBUTEROL SULFATE 3 MILLILITER(S): .5; 2.5 SOLUTION RESPIRATORY (INHALATION) at 20:48

## 2025-08-13 LAB
A1C WITH ESTIMATED AVERAGE GLUCOSE RESULT: 11.7 % — HIGH (ref 4–5.6)
ACETONE SERPL-MCNC: NEGATIVE — SIGNIFICANT CHANGE UP
ALBUMIN SERPL ELPH-MCNC: 2.2 G/DL — LOW (ref 3.5–5)
ALBUMIN SERPL ELPH-MCNC: 2.4 G/DL — LOW (ref 3.5–5)
ALP SERPL-CCNC: 70 U/L — SIGNIFICANT CHANGE UP (ref 40–120)
ALP SERPL-CCNC: 80 U/L — SIGNIFICANT CHANGE UP (ref 40–120)
ALT FLD-CCNC: 29 U/L DA — SIGNIFICANT CHANGE UP (ref 10–60)
ALT FLD-CCNC: 30 U/L DA — SIGNIFICANT CHANGE UP (ref 10–60)
ANION GAP SERPL CALC-SCNC: 1 MMOL/L — LOW (ref 5–17)
ANION GAP SERPL CALC-SCNC: 2 MMOL/L — LOW (ref 5–17)
ANION GAP SERPL CALC-SCNC: 3 MMOL/L — LOW (ref 5–17)
APPEARANCE UR: ABNORMAL
AST SERPL-CCNC: 11 U/L — SIGNIFICANT CHANGE UP (ref 10–40)
AST SERPL-CCNC: 17 U/L — SIGNIFICANT CHANGE UP (ref 10–40)
BACTERIA # UR AUTO: ABNORMAL /HPF
BASE EXCESS BLDA CALC-SCNC: 22.2 MMOL/L — HIGH (ref -2–3)
BASE EXCESS BLDV CALC-SCNC: 14.7 MMOL/L — SIGNIFICANT CHANGE UP
BASE EXCESS BLDV CALC-SCNC: 15.6 MMOL/L — SIGNIFICANT CHANGE UP
BASOPHILS # BLD AUTO: 0.01 K/UL — SIGNIFICANT CHANGE UP (ref 0–0.2)
BASOPHILS NFR BLD AUTO: 0.1 % — SIGNIFICANT CHANGE UP (ref 0–2)
BILIRUB SERPL-MCNC: 0.3 MG/DL — SIGNIFICANT CHANGE UP (ref 0.2–1.2)
BILIRUB SERPL-MCNC: 0.4 MG/DL — SIGNIFICANT CHANGE UP (ref 0.2–1.2)
BILIRUB UR-MCNC: NEGATIVE — SIGNIFICANT CHANGE UP
BLOOD GAS COMMENTS ARTERIAL: SIGNIFICANT CHANGE UP
BLOOD GAS COMMENTS, VENOUS: SIGNIFICANT CHANGE UP
BUN SERPL-MCNC: 29 MG/DL — HIGH (ref 7–18)
BUN SERPL-MCNC: 30 MG/DL — HIGH (ref 7–18)
BUN SERPL-MCNC: 31 MG/DL — HIGH (ref 7–18)
CA-I SERPL-SCNC: SIGNIFICANT CHANGE UP MMOL/L (ref 1.15–1.33)
CA-I SERPL-SCNC: SIGNIFICANT CHANGE UP MMOL/L (ref 1.15–1.33)
CALCIUM SERPL-MCNC: 8.3 MG/DL — LOW (ref 8.4–10.5)
CALCIUM SERPL-MCNC: 8.6 MG/DL — SIGNIFICANT CHANGE UP (ref 8.4–10.5)
CALCIUM SERPL-MCNC: 9 MG/DL — SIGNIFICANT CHANGE UP (ref 8.4–10.5)
CHLORIDE SERPL-SCNC: 87 MMOL/L — LOW (ref 96–108)
CHLORIDE SERPL-SCNC: 87 MMOL/L — LOW (ref 96–108)
CHLORIDE SERPL-SCNC: 90 MMOL/L — LOW (ref 96–108)
CO2 SERPL-SCNC: 39 MMOL/L — HIGH (ref 22–31)
CO2 SERPL-SCNC: 39 MMOL/L — HIGH (ref 22–31)
CO2 SERPL-SCNC: 42 MMOL/L — HIGH (ref 22–31)
COLOR SPEC: YELLOW — SIGNIFICANT CHANGE UP
COMMENT - URINE: SIGNIFICANT CHANGE UP
CREAT SERPL-MCNC: 1.13 MG/DL — SIGNIFICANT CHANGE UP (ref 0.5–1.3)
CREAT SERPL-MCNC: 1.33 MG/DL — HIGH (ref 0.5–1.3)
CREAT SERPL-MCNC: 1.39 MG/DL — HIGH (ref 0.5–1.3)
DIFF PNL FLD: ABNORMAL
EGFR: 57 ML/MIN/1.73M2 — LOW
EGFR: 57 ML/MIN/1.73M2 — LOW
EGFR: 60 ML/MIN/1.73M2 — SIGNIFICANT CHANGE UP
EGFR: 60 ML/MIN/1.73M2 — SIGNIFICANT CHANGE UP
EGFR: 73 ML/MIN/1.73M2 — SIGNIFICANT CHANGE UP
EGFR: 73 ML/MIN/1.73M2 — SIGNIFICANT CHANGE UP
EOSINOPHIL # BLD AUTO: 0 K/UL — SIGNIFICANT CHANGE UP (ref 0–0.5)
EOSINOPHIL NFR BLD AUTO: 0 % — SIGNIFICANT CHANGE UP (ref 0–6)
EPI CELLS # UR: PRESENT
ESTIMATED AVERAGE GLUCOSE: 289 MG/DL — HIGH (ref 68–114)
GAS PNL BLDV: 124 MMOL/L — LOW (ref 136–145)
GAS PNL BLDV: 127 MMOL/L — LOW (ref 136–145)
GAS PNL BLDV: SIGNIFICANT CHANGE UP
GAS PNL BLDV: SIGNIFICANT CHANGE UP
GLUCOSE BLDC GLUCOMTR-MCNC: 237 MG/DL — HIGH (ref 70–99)
GLUCOSE BLDC GLUCOMTR-MCNC: 269 MG/DL — HIGH (ref 70–99)
GLUCOSE BLDC GLUCOMTR-MCNC: 562 MG/DL — CRITICAL HIGH (ref 70–99)
GLUCOSE BLDC GLUCOMTR-MCNC: 569 MG/DL — CRITICAL HIGH (ref 70–99)
GLUCOSE BLDC GLUCOMTR-MCNC: 588 MG/DL — CRITICAL HIGH (ref 70–99)
GLUCOSE BLDC GLUCOMTR-MCNC: 596 MG/DL — CRITICAL HIGH (ref 70–99)
GLUCOSE BLDC GLUCOMTR-MCNC: 596 MG/DL — CRITICAL HIGH (ref 70–99)
GLUCOSE BLDC GLUCOMTR-MCNC: >600 MG/DL — CRITICAL HIGH (ref 70–99)
GLUCOSE BLDV-MCNC: >685 MG/DL — CRITICAL HIGH (ref 70–99)
GLUCOSE BLDV-MCNC: >685 MG/DL — CRITICAL HIGH (ref 70–99)
GLUCOSE SERPL-MCNC: 237 MG/DL — HIGH (ref 70–99)
GLUCOSE SERPL-MCNC: 577 MG/DL — CRITICAL HIGH (ref 70–99)
GLUCOSE SERPL-MCNC: 583 MG/DL — CRITICAL HIGH (ref 70–99)
GLUCOSE UR QL: NEGATIVE MG/DL — SIGNIFICANT CHANGE UP
HCO3 BLDA-SCNC: 49 MMOL/L — CRITICAL HIGH (ref 21–28)
HCO3 BLDV-SCNC: 41 MMOL/L — HIGH (ref 22–29)
HCO3 BLDV-SCNC: 42 MMOL/L — HIGH (ref 22–29)
HCT VFR BLD CALC: 35 % — LOW (ref 39–50)
HGB BLD-MCNC: 10.5 G/DL — LOW (ref 13–17)
HOROWITZ INDEX BLDA+IHG-RTO: 50 — SIGNIFICANT CHANGE UP
IMM GRANULOCYTES NFR BLD AUTO: 0.5 % — SIGNIFICANT CHANGE UP (ref 0–0.9)
KETONES UR QL: NEGATIVE MG/DL — SIGNIFICANT CHANGE UP
LACTATE BLDV-MCNC: 1.8 MMOL/L — SIGNIFICANT CHANGE UP (ref 0.5–2)
LACTATE BLDV-MCNC: 1.8 MMOL/L — SIGNIFICANT CHANGE UP (ref 0.5–2)
LEGIONELLA AG UR QL: NEGATIVE — SIGNIFICANT CHANGE UP
LEUKOCYTE ESTERASE UR-ACNC: ABNORMAL
LYMPHOCYTES # BLD AUTO: 0.81 K/UL — LOW (ref 1–3.3)
LYMPHOCYTES # BLD AUTO: 6.2 % — LOW (ref 13–44)
M PNEUMO IGM SER-ACNC: 0.19 INDEX — SIGNIFICANT CHANGE UP (ref 0–0.9)
MAGNESIUM SERPL-MCNC: 1.9 MG/DL — SIGNIFICANT CHANGE UP (ref 1.6–2.6)
MAGNESIUM SERPL-MCNC: 2.2 MG/DL — SIGNIFICANT CHANGE UP (ref 1.6–2.6)
MAGNESIUM SERPL-MCNC: 2.3 MG/DL — SIGNIFICANT CHANGE UP (ref 1.6–2.6)
MCHC RBC-ENTMCNC: 25.7 PG — LOW (ref 27–34)
MCHC RBC-ENTMCNC: 30 G/DL — LOW (ref 32–36)
MCV RBC AUTO: 85.8 FL — SIGNIFICANT CHANGE UP (ref 80–100)
MONOCYTES # BLD AUTO: 0.91 K/UL — HIGH (ref 0–0.9)
MONOCYTES NFR BLD AUTO: 6.9 % — SIGNIFICANT CHANGE UP (ref 2–14)
MRSA PCR RESULT.: SIGNIFICANT CHANGE UP
MYCOPLASMA AG SPEC QL: NEGATIVE — SIGNIFICANT CHANGE UP
NEUTROPHILS # BLD AUTO: 11.33 K/UL — HIGH (ref 1.8–7.4)
NEUTROPHILS NFR BLD AUTO: 86.3 % — HIGH (ref 43–77)
NITRITE UR-MCNC: NEGATIVE — SIGNIFICANT CHANGE UP
NRBC BLD AUTO-RTO: 0 /100 WBCS — SIGNIFICANT CHANGE UP (ref 0–0)
OSMOLALITY SERPL: 314 MOSMOL/KG — HIGH (ref 280–301)
OSMOLALITY SERPL: 316 MOSMOL/KG — HIGH (ref 280–301)
PCO2 BLDA: 60 MMHG — HIGH (ref 35–48)
PCO2 BLDV: 58 MMHG — HIGH (ref 42–55)
PCO2 BLDV: 58 MMHG — HIGH (ref 42–55)
PH BLDA: 7.52 — HIGH (ref 7.35–7.45)
PH BLDV: 7.46 — HIGH (ref 7.32–7.43)
PH BLDV: 7.47 — HIGH (ref 7.32–7.43)
PH UR: 6 — SIGNIFICANT CHANGE UP (ref 5–8)
PHOSPHATE SERPL-MCNC: 3 MG/DL — SIGNIFICANT CHANGE UP (ref 2.5–4.5)
PHOSPHATE SERPL-MCNC: 3.3 MG/DL — SIGNIFICANT CHANGE UP (ref 2.5–4.5)
PHOSPHATE SERPL-MCNC: 3.6 MG/DL — SIGNIFICANT CHANGE UP (ref 2.5–4.5)
PLATELET # BLD AUTO: 255 K/UL — SIGNIFICANT CHANGE UP (ref 150–400)
PO2 BLDA: 104 MMHG — SIGNIFICANT CHANGE UP (ref 83–108)
PO2 BLDV: 52 MMHG — SIGNIFICANT CHANGE UP
PO2 BLDV: 53 MMHG — SIGNIFICANT CHANGE UP
POTASSIUM BLDV-SCNC: 4.8 MMOL/L — SIGNIFICANT CHANGE UP (ref 3.5–5.1)
POTASSIUM BLDV-SCNC: 4.8 MMOL/L — SIGNIFICANT CHANGE UP (ref 3.5–5.1)
POTASSIUM SERPL-MCNC: 4.6 MMOL/L — SIGNIFICANT CHANGE UP (ref 3.5–5.3)
POTASSIUM SERPL-MCNC: 4.6 MMOL/L — SIGNIFICANT CHANGE UP (ref 3.5–5.3)
POTASSIUM SERPL-MCNC: 4.7 MMOL/L — SIGNIFICANT CHANGE UP (ref 3.5–5.3)
POTASSIUM SERPL-SCNC: 4.6 MMOL/L — SIGNIFICANT CHANGE UP (ref 3.5–5.3)
POTASSIUM SERPL-SCNC: 4.6 MMOL/L — SIGNIFICANT CHANGE UP (ref 3.5–5.3)
POTASSIUM SERPL-SCNC: 4.7 MMOL/L — SIGNIFICANT CHANGE UP (ref 3.5–5.3)
PROT SERPL-MCNC: 6.7 G/DL — SIGNIFICANT CHANGE UP (ref 6–8.3)
PROT SERPL-MCNC: 7.4 G/DL — SIGNIFICANT CHANGE UP (ref 6–8.3)
PROT UR-MCNC: 30 MG/DL
RBC # BLD: 4.08 M/UL — LOW (ref 4.2–5.8)
RBC # FLD: 13.3 % — SIGNIFICANT CHANGE UP (ref 10.3–14.5)
RBC CASTS # UR COMP ASSIST: 10 /HPF — HIGH (ref 0–4)
S AUREUS DNA NOSE QL NAA+PROBE: SIGNIFICANT CHANGE UP
S PNEUM AG UR QL: NEGATIVE — SIGNIFICANT CHANGE UP
SAO2 % BLDA: SIGNIFICANT CHANGE UP %
SAO2 % BLDV: 85.1 % — SIGNIFICANT CHANGE UP
SAO2 % BLDV: 87.9 % — SIGNIFICANT CHANGE UP
SODIUM SERPL-SCNC: 128 MMOL/L — LOW (ref 135–145)
SODIUM SERPL-SCNC: 129 MMOL/L — LOW (ref 135–145)
SODIUM SERPL-SCNC: 133 MMOL/L — LOW (ref 135–145)
SP GR SPEC: 1.01 — SIGNIFICANT CHANGE UP (ref 1–1.03)
UROBILINOGEN FLD QL: 0.2 MG/DL — SIGNIFICANT CHANGE UP (ref 0.2–1)
WBC # BLD: 13.13 K/UL — HIGH (ref 3.8–10.5)
WBC # FLD AUTO: 13.13 K/UL — HIGH (ref 3.8–10.5)
WBC UR QL: ABNORMAL /HPF (ref 0–5)

## 2025-08-13 PROCEDURE — 99291 CRITICAL CARE FIRST HOUR: CPT | Mod: GC

## 2025-08-13 RX ORDER — HYDROMORPHONE/SOD CHLOR,ISO/PF 2 MG/10 ML
0.5 SYRINGE (ML) INJECTION ONCE
Refills: 0 | Status: DISCONTINUED | OUTPATIENT
Start: 2025-08-13 | End: 2025-08-13

## 2025-08-13 RX ORDER — DEXTROSE 50 % IN WATER 50 %
25 SYRINGE (ML) INTRAVENOUS
Refills: 0 | Status: DISCONTINUED | OUTPATIENT
Start: 2025-08-13 | End: 2025-08-15

## 2025-08-13 RX ORDER — AZITHROMYCIN 250 MG
500 CAPSULE ORAL EVERY 24 HOURS
Refills: 0 | Status: DISCONTINUED | OUTPATIENT
Start: 2025-08-14 | End: 2025-08-14

## 2025-08-13 RX ORDER — INSULIN LISPRO 100 U/ML
INJECTION, SOLUTION INTRAVENOUS; SUBCUTANEOUS
Refills: 0 | Status: DISCONTINUED | OUTPATIENT
Start: 2025-08-13 | End: 2025-08-13

## 2025-08-13 RX ORDER — INSULIN GLARGINE-YFGN 100 [IU]/ML
25 INJECTION, SOLUTION SUBCUTANEOUS ONCE
Refills: 0 | Status: COMPLETED | OUTPATIENT
Start: 2025-08-13 | End: 2025-08-13

## 2025-08-13 RX ORDER — RISPERIDONE 4 MG
0.25 TABLET ORAL DAILY
Refills: 0 | Status: DISCONTINUED | OUTPATIENT
Start: 2025-08-13 | End: 2025-08-19

## 2025-08-13 RX ORDER — LIDOCAINE HYDROCHLORIDE 20 MG/ML
1 JELLY TOPICAL DAILY
Refills: 0 | Status: DISCONTINUED | OUTPATIENT
Start: 2025-08-13 | End: 2025-08-19

## 2025-08-13 RX ORDER — METHYLPREDNISOLONE ACETATE 80 MG/ML
40 INJECTION, SUSPENSION INTRA-ARTICULAR; INTRALESIONAL; INTRAMUSCULAR; SOFT TISSUE EVERY 8 HOURS
Refills: 0 | Status: DISCONTINUED | OUTPATIENT
Start: 2025-08-13 | End: 2025-08-15

## 2025-08-13 RX ORDER — ACETAMINOPHEN 500 MG/5ML
650 LIQUID (ML) ORAL EVERY 6 HOURS
Refills: 0 | Status: DISCONTINUED | OUTPATIENT
Start: 2025-08-13 | End: 2025-08-19

## 2025-08-13 RX ORDER — ALPRAZOLAM 0.5 MG
0.5 TABLET, EXTENDED RELEASE 24 HR ORAL THREE TIMES A DAY
Refills: 0 | Status: DISCONTINUED | OUTPATIENT
Start: 2025-08-13 | End: 2025-08-18

## 2025-08-13 RX ORDER — INSULIN LISPRO 100 U/ML
INJECTION, SOLUTION INTRAVENOUS; SUBCUTANEOUS AT BEDTIME
Refills: 0 | Status: DISCONTINUED | OUTPATIENT
Start: 2025-08-13 | End: 2025-08-13

## 2025-08-13 RX ORDER — DEXTROSE 50 % IN WATER 50 %
50 SYRINGE (ML) INTRAVENOUS
Refills: 0 | Status: DISCONTINUED | OUTPATIENT
Start: 2025-08-13 | End: 2025-08-15

## 2025-08-13 RX ORDER — PIPERACILLIN-TAZO-DEXTROSE,ISO 3.375G/5
3.38 IV SOLUTION, PIGGYBACK PREMIX FROZEN(ML) INTRAVENOUS EVERY 8 HOURS
Refills: 0 | Status: DISCONTINUED | OUTPATIENT
Start: 2025-08-13 | End: 2025-08-19

## 2025-08-13 RX ADMIN — HEPARIN SODIUM 5000 UNIT(S): 1000 INJECTION INTRAVENOUS; SUBCUTANEOUS at 17:11

## 2025-08-13 RX ADMIN — IPRATROPIUM BROMIDE AND ALBUTEROL SULFATE 3 MILLILITER(S): .5; 2.5 SOLUTION RESPIRATORY (INHALATION) at 08:26

## 2025-08-13 RX ADMIN — Medication 0.5 MILLIGRAM(S): at 02:07

## 2025-08-13 RX ADMIN — Medication 650 MILLIGRAM(S): at 17:23

## 2025-08-13 RX ADMIN — NICOTINE POLACRILEX 1 PATCH: 4 GUM, CHEWING ORAL at 07:25

## 2025-08-13 RX ADMIN — Medication 0.5 MILLIGRAM(S): at 21:22

## 2025-08-13 RX ADMIN — Medication 250 MILLIGRAM(S): at 15:35

## 2025-08-13 RX ADMIN — Medication 650 MILLIGRAM(S): at 16:23

## 2025-08-13 RX ADMIN — IPRATROPIUM BROMIDE AND ALBUTEROL SULFATE 3 MILLILITER(S): .5; 2.5 SOLUTION RESPIRATORY (INHALATION) at 15:29

## 2025-08-13 RX ADMIN — METHYLPREDNISOLONE ACETATE 40 MILLIGRAM(S): 80 INJECTION, SUSPENSION INTRA-ARTICULAR; INTRALESIONAL; INTRAMUSCULAR; SOFT TISSUE at 11:13

## 2025-08-13 RX ADMIN — Medication 25 GRAM(S): at 13:09

## 2025-08-13 RX ADMIN — HEPARIN SODIUM 5000 UNIT(S): 1000 INJECTION INTRAVENOUS; SUBCUTANEOUS at 05:06

## 2025-08-13 RX ADMIN — Medication 10 UNIT(S)/HR: at 22:52

## 2025-08-13 RX ADMIN — NICOTINE POLACRILEX 1 PATCH: 4 GUM, CHEWING ORAL at 11:41

## 2025-08-13 RX ADMIN — IPRATROPIUM BROMIDE AND ALBUTEROL SULFATE 3 MILLILITER(S): .5; 2.5 SOLUTION RESPIRATORY (INHALATION) at 03:16

## 2025-08-13 RX ADMIN — INSULIN LISPRO 12: 100 INJECTION, SOLUTION INTRAVENOUS; SUBCUTANEOUS at 17:11

## 2025-08-13 RX ADMIN — NICOTINE POLACRILEX 1 PATCH: 4 GUM, CHEWING ORAL at 18:37

## 2025-08-13 RX ADMIN — METHYLPREDNISOLONE ACETATE 40 MILLIGRAM(S): 80 INJECTION, SUSPENSION INTRA-ARTICULAR; INTRALESIONAL; INTRAMUSCULAR; SOFT TISSUE at 18:33

## 2025-08-13 RX ADMIN — Medication 1 DOSE(S): at 11:34

## 2025-08-13 RX ADMIN — IPRATROPIUM BROMIDE AND ALBUTEROL SULFATE 3 MILLILITER(S): .5; 2.5 SOLUTION RESPIRATORY (INHALATION) at 20:05

## 2025-08-13 RX ADMIN — LIDOCAINE HYDROCHLORIDE 1 PATCH: 20 JELLY TOPICAL at 15:35

## 2025-08-13 RX ADMIN — INSULIN LISPRO 4: 100 INJECTION, SOLUTION INTRAVENOUS; SUBCUTANEOUS at 05:11

## 2025-08-13 RX ADMIN — Medication 0.5 MILLIGRAM(S): at 03:53

## 2025-08-13 RX ADMIN — Medication 25 GRAM(S): at 21:21

## 2025-08-13 RX ADMIN — Medication 0.5 MILLIGRAM(S): at 06:23

## 2025-08-13 RX ADMIN — NICOTINE POLACRILEX 1 PATCH: 4 GUM, CHEWING ORAL at 17:43

## 2025-08-13 RX ADMIN — INSULIN GLARGINE-YFGN 25 UNIT(S): 100 INJECTION, SOLUTION SUBCUTANEOUS at 18:58

## 2025-08-13 RX ADMIN — Medication 40 MILLIGRAM(S): at 11:41

## 2025-08-13 RX ADMIN — Medication 25 GRAM(S): at 05:06

## 2025-08-13 RX ADMIN — LIDOCAINE HYDROCHLORIDE 1 PATCH: 20 JELLY TOPICAL at 18:37

## 2025-08-13 RX ADMIN — Medication 0.25 MILLIGRAM(S): at 16:46

## 2025-08-13 RX ADMIN — Medication 0.5 MILLIGRAM(S): at 05:43

## 2025-08-13 RX ADMIN — Medication 1 APPLICATION(S): at 05:06

## 2025-08-13 RX ADMIN — INSULIN LISPRO 6: 100 INJECTION, SOLUTION INTRAVENOUS; SUBCUTANEOUS at 11:23

## 2025-08-14 ENCOUNTER — RESULT REVIEW (OUTPATIENT)
Age: 65
End: 2025-08-14

## 2025-08-14 LAB
-  COAGULASE NEGATIVE STAPHYLOCOCCUS: SIGNIFICANT CHANGE UP
ALBUMIN SERPL ELPH-MCNC: 2.4 G/DL — LOW (ref 3.5–5)
ALBUMIN SERPL ELPH-MCNC: 2.5 G/DL — LOW (ref 3.5–5)
ALP SERPL-CCNC: 73 U/L — SIGNIFICANT CHANGE UP (ref 40–120)
ALP SERPL-CCNC: 73 U/L — SIGNIFICANT CHANGE UP (ref 40–120)
ALT FLD-CCNC: 29 U/L DA — SIGNIFICANT CHANGE UP (ref 10–60)
ALT FLD-CCNC: 30 U/L DA — SIGNIFICANT CHANGE UP (ref 10–60)
ANION GAP SERPL CALC-SCNC: -1 MMOL/L — LOW (ref 5–17)
ANION GAP SERPL CALC-SCNC: 2 MMOL/L — LOW (ref 5–17)
AST SERPL-CCNC: 12 U/L — SIGNIFICANT CHANGE UP (ref 10–40)
AST SERPL-CCNC: 13 U/L — SIGNIFICANT CHANGE UP (ref 10–40)
B-OH-BUTYR SERPL-SCNC: 0.5 MMOL/L — HIGH
BASOPHILS # BLD AUTO: 0.02 K/UL — SIGNIFICANT CHANGE UP (ref 0–0.2)
BASOPHILS # BLD AUTO: 0.02 K/UL — SIGNIFICANT CHANGE UP (ref 0–0.2)
BASOPHILS NFR BLD AUTO: 0.1 % — SIGNIFICANT CHANGE UP (ref 0–2)
BASOPHILS NFR BLD AUTO: 0.2 % — SIGNIFICANT CHANGE UP (ref 0–2)
BILIRUB SERPL-MCNC: 0.4 MG/DL — SIGNIFICANT CHANGE UP (ref 0.2–1.2)
BILIRUB SERPL-MCNC: 0.4 MG/DL — SIGNIFICANT CHANGE UP (ref 0.2–1.2)
BUN SERPL-MCNC: 28 MG/DL — HIGH (ref 7–18)
BUN SERPL-MCNC: 29 MG/DL — HIGH (ref 7–18)
BUN SERPL-MCNC: 31 MG/DL — HIGH (ref 7–18)
CALCIUM SERPL-MCNC: 8.5 MG/DL — SIGNIFICANT CHANGE UP (ref 8.4–10.5)
CALCIUM SERPL-MCNC: 8.7 MG/DL — SIGNIFICANT CHANGE UP (ref 8.4–10.5)
CALCIUM SERPL-MCNC: 8.7 MG/DL — SIGNIFICANT CHANGE UP (ref 8.4–10.5)
CHLORIDE SERPL-SCNC: 89 MMOL/L — LOW (ref 96–108)
CHLORIDE SERPL-SCNC: 93 MMOL/L — LOW (ref 96–108)
CHLORIDE SERPL-SCNC: 94 MMOL/L — LOW (ref 96–108)
CO2 SERPL-SCNC: 38 MMOL/L — HIGH (ref 22–31)
CO2 SERPL-SCNC: 39 MMOL/L — HIGH (ref 22–31)
CO2 SERPL-SCNC: 41 MMOL/L — HIGH (ref 22–31)
CREAT SERPL-MCNC: 1.13 MG/DL — SIGNIFICANT CHANGE UP (ref 0.5–1.3)
CREAT SERPL-MCNC: 1.15 MG/DL — SIGNIFICANT CHANGE UP (ref 0.5–1.3)
CREAT SERPL-MCNC: 1.16 MG/DL — SIGNIFICANT CHANGE UP (ref 0.5–1.3)
CULTURE RESULTS: ABNORMAL
EGFR: 71 ML/MIN/1.73M2 — SIGNIFICANT CHANGE UP
EGFR: 71 ML/MIN/1.73M2 — SIGNIFICANT CHANGE UP
EGFR: 72 ML/MIN/1.73M2 — SIGNIFICANT CHANGE UP
EGFR: 72 ML/MIN/1.73M2 — SIGNIFICANT CHANGE UP
EGFR: 73 ML/MIN/1.73M2 — SIGNIFICANT CHANGE UP
EGFR: 73 ML/MIN/1.73M2 — SIGNIFICANT CHANGE UP
EOSINOPHIL # BLD AUTO: 0 K/UL — SIGNIFICANT CHANGE UP (ref 0–0.5)
EOSINOPHIL # BLD AUTO: 0 K/UL — SIGNIFICANT CHANGE UP (ref 0–0.5)
EOSINOPHIL NFR BLD AUTO: 0 % — SIGNIFICANT CHANGE UP (ref 0–6)
EOSINOPHIL NFR BLD AUTO: 0 % — SIGNIFICANT CHANGE UP (ref 0–6)
GLUCOSE BLDC GLUCOMTR-MCNC: 183 MG/DL — HIGH (ref 70–99)
GLUCOSE BLDC GLUCOMTR-MCNC: 227 MG/DL — HIGH (ref 70–99)
GLUCOSE BLDC GLUCOMTR-MCNC: 231 MG/DL — HIGH (ref 70–99)
GLUCOSE BLDC GLUCOMTR-MCNC: 274 MG/DL — HIGH (ref 70–99)
GLUCOSE BLDC GLUCOMTR-MCNC: 337 MG/DL — HIGH (ref 70–99)
GLUCOSE BLDC GLUCOMTR-MCNC: 382 MG/DL — HIGH (ref 70–99)
GLUCOSE BLDC GLUCOMTR-MCNC: 437 MG/DL — HIGH (ref 70–99)
GLUCOSE BLDC GLUCOMTR-MCNC: 439 MG/DL — HIGH (ref 70–99)
GLUCOSE BLDC GLUCOMTR-MCNC: 443 MG/DL — HIGH (ref 70–99)
GLUCOSE BLDC GLUCOMTR-MCNC: 444 MG/DL — HIGH (ref 70–99)
GLUCOSE BLDC GLUCOMTR-MCNC: 456 MG/DL — CRITICAL HIGH (ref 70–99)
GLUCOSE BLDC GLUCOMTR-MCNC: 465 MG/DL — CRITICAL HIGH (ref 70–99)
GLUCOSE BLDC GLUCOMTR-MCNC: 484 MG/DL — CRITICAL HIGH (ref 70–99)
GLUCOSE BLDC GLUCOMTR-MCNC: 492 MG/DL — CRITICAL HIGH (ref 70–99)
GLUCOSE BLDC GLUCOMTR-MCNC: 523 MG/DL — CRITICAL HIGH (ref 70–99)
GLUCOSE SERPL-MCNC: 176 MG/DL — HIGH (ref 70–99)
GLUCOSE SERPL-MCNC: 309 MG/DL — HIGH (ref 70–99)
GLUCOSE SERPL-MCNC: 402 MG/DL — HIGH (ref 70–99)
GRAM STN FLD: ABNORMAL
HCT VFR BLD CALC: 35.2 % — LOW (ref 39–50)
HCT VFR BLD CALC: 35.7 % — LOW (ref 39–50)
HGB BLD-MCNC: 10.8 G/DL — LOW (ref 13–17)
HGB BLD-MCNC: 10.9 G/DL — LOW (ref 13–17)
IMM GRANULOCYTES NFR BLD AUTO: 0.6 % — SIGNIFICANT CHANGE UP (ref 0–0.9)
IMM GRANULOCYTES NFR BLD AUTO: 0.7 % — SIGNIFICANT CHANGE UP (ref 0–0.9)
LYMPHOCYTES # BLD AUTO: 0.53 K/UL — LOW (ref 1–3.3)
LYMPHOCYTES # BLD AUTO: 0.87 K/UL — LOW (ref 1–3.3)
LYMPHOCYTES # BLD AUTO: 5.3 % — LOW (ref 13–44)
LYMPHOCYTES # BLD AUTO: 5.8 % — LOW (ref 13–44)
MAGNESIUM SERPL-MCNC: 2.3 MG/DL — SIGNIFICANT CHANGE UP (ref 1.6–2.6)
MAGNESIUM SERPL-MCNC: 2.4 MG/DL — SIGNIFICANT CHANGE UP (ref 1.6–2.6)
MAGNESIUM SERPL-MCNC: 2.5 MG/DL — SIGNIFICANT CHANGE UP (ref 1.6–2.6)
MCHC RBC-ENTMCNC: 25.5 PG — LOW (ref 27–34)
MCHC RBC-ENTMCNC: 25.8 PG — LOW (ref 27–34)
MCHC RBC-ENTMCNC: 30.3 G/DL — LOW (ref 32–36)
MCHC RBC-ENTMCNC: 31 G/DL — LOW (ref 32–36)
MCV RBC AUTO: 83.4 FL — SIGNIFICANT CHANGE UP (ref 80–100)
MCV RBC AUTO: 84.4 FL — SIGNIFICANT CHANGE UP (ref 80–100)
METHOD TYPE: SIGNIFICANT CHANGE UP
MONOCYTES # BLD AUTO: 0.32 K/UL — SIGNIFICANT CHANGE UP (ref 0–0.9)
MONOCYTES # BLD AUTO: 1.25 K/UL — HIGH (ref 0–0.9)
MONOCYTES NFR BLD AUTO: 3.2 % — SIGNIFICANT CHANGE UP (ref 2–14)
MONOCYTES NFR BLD AUTO: 8.3 % — SIGNIFICANT CHANGE UP (ref 2–14)
NEUTROPHILS # BLD AUTO: 12.86 K/UL — HIGH (ref 1.8–7.4)
NEUTROPHILS # BLD AUTO: 9.05 K/UL — HIGH (ref 1.8–7.4)
NEUTROPHILS NFR BLD AUTO: 85.1 % — HIGH (ref 43–77)
NEUTROPHILS NFR BLD AUTO: 90.7 % — HIGH (ref 43–77)
NRBC BLD AUTO-RTO: 0 /100 WBCS — SIGNIFICANT CHANGE UP (ref 0–0)
NRBC BLD AUTO-RTO: 0 /100 WBCS — SIGNIFICANT CHANGE UP (ref 0–0)
ORGANISM # SPEC MICROSCOPIC CNT: ABNORMAL
ORGANISM # SPEC MICROSCOPIC CNT: ABNORMAL
PHOSPHATE SERPL-MCNC: 3.8 MG/DL — SIGNIFICANT CHANGE UP (ref 2.5–4.5)
PHOSPHATE SERPL-MCNC: 3.8 MG/DL — SIGNIFICANT CHANGE UP (ref 2.5–4.5)
PHOSPHATE SERPL-MCNC: 4.3 MG/DL — SIGNIFICANT CHANGE UP (ref 2.5–4.5)
PLATELET # BLD AUTO: 232 K/UL — SIGNIFICANT CHANGE UP (ref 150–400)
PLATELET # BLD AUTO: 260 K/UL — SIGNIFICANT CHANGE UP (ref 150–400)
POTASSIUM SERPL-MCNC: 4 MMOL/L — SIGNIFICANT CHANGE UP (ref 3.5–5.3)
POTASSIUM SERPL-MCNC: 4 MMOL/L — SIGNIFICANT CHANGE UP (ref 3.5–5.3)
POTASSIUM SERPL-MCNC: 4.4 MMOL/L — SIGNIFICANT CHANGE UP (ref 3.5–5.3)
POTASSIUM SERPL-SCNC: 4 MMOL/L — SIGNIFICANT CHANGE UP (ref 3.5–5.3)
POTASSIUM SERPL-SCNC: 4 MMOL/L — SIGNIFICANT CHANGE UP (ref 3.5–5.3)
POTASSIUM SERPL-SCNC: 4.4 MMOL/L — SIGNIFICANT CHANGE UP (ref 3.5–5.3)
PROT SERPL-MCNC: 7.2 G/DL — SIGNIFICANT CHANGE UP (ref 6–8.3)
PROT SERPL-MCNC: 7.3 G/DL — SIGNIFICANT CHANGE UP (ref 6–8.3)
RBC # BLD: 4.22 M/UL — SIGNIFICANT CHANGE UP (ref 4.2–5.8)
RBC # BLD: 4.23 M/UL — SIGNIFICANT CHANGE UP (ref 4.2–5.8)
RBC # FLD: 14 % — SIGNIFICANT CHANGE UP (ref 10.3–14.5)
RBC # FLD: 14 % — SIGNIFICANT CHANGE UP (ref 10.3–14.5)
SODIUM SERPL-SCNC: 131 MMOL/L — LOW (ref 135–145)
SODIUM SERPL-SCNC: 133 MMOL/L — LOW (ref 135–145)
SODIUM SERPL-SCNC: 134 MMOL/L — LOW (ref 135–145)
SPECIMEN SOURCE: SIGNIFICANT CHANGE UP
WBC # BLD: 15.11 K/UL — HIGH (ref 3.8–10.5)
WBC # BLD: 9.98 K/UL — SIGNIFICANT CHANGE UP (ref 3.8–10.5)
WBC # FLD AUTO: 15.11 K/UL — HIGH (ref 3.8–10.5)
WBC # FLD AUTO: 9.98 K/UL — SIGNIFICANT CHANGE UP (ref 3.8–10.5)

## 2025-08-14 PROCEDURE — 93306 TTE W/DOPPLER COMPLETE: CPT | Mod: 26

## 2025-08-14 PROCEDURE — 99291 CRITICAL CARE FIRST HOUR: CPT | Mod: GC

## 2025-08-14 PROCEDURE — 99255 IP/OBS CONSLTJ NEW/EST HI 80: CPT

## 2025-08-14 RX ORDER — INSULIN GLARGINE-YFGN 100 [IU]/ML
20 INJECTION, SOLUTION SUBCUTANEOUS ONCE
Refills: 0 | Status: COMPLETED | OUTPATIENT
Start: 2025-08-14 | End: 2025-08-14

## 2025-08-14 RX ORDER — INSULIN GLARGINE-YFGN 100 [IU]/ML
40 INJECTION, SOLUTION SUBCUTANEOUS AT BEDTIME
Refills: 0 | Status: DISCONTINUED | OUTPATIENT
Start: 2025-08-14 | End: 2025-08-15

## 2025-08-14 RX ORDER — INSULIN LISPRO 100 U/ML
INJECTION, SOLUTION INTRAVENOUS; SUBCUTANEOUS AT BEDTIME
Refills: 0 | Status: DISCONTINUED | OUTPATIENT
Start: 2025-08-14 | End: 2025-08-14

## 2025-08-14 RX ORDER — ENOXAPARIN SODIUM 100 MG/ML
40 INJECTION SUBCUTANEOUS EVERY 24 HOURS
Refills: 0 | Status: DISCONTINUED | OUTPATIENT
Start: 2025-08-14 | End: 2025-08-19

## 2025-08-14 RX ORDER — INSULIN LISPRO 100 U/ML
5 INJECTION, SOLUTION INTRAVENOUS; SUBCUTANEOUS
Refills: 0 | Status: DISCONTINUED | OUTPATIENT
Start: 2025-08-14 | End: 2025-08-14

## 2025-08-14 RX ORDER — HYDROMORPHONE/SOD CHLOR,ISO/PF 2 MG/10 ML
0.5 SYRINGE (ML) INJECTION ONCE
Refills: 0 | Status: DISCONTINUED | OUTPATIENT
Start: 2025-08-14 | End: 2025-08-14

## 2025-08-14 RX ORDER — INSULIN LISPRO 100 U/ML
INJECTION, SOLUTION INTRAVENOUS; SUBCUTANEOUS
Refills: 0 | Status: DISCONTINUED | OUTPATIENT
Start: 2025-08-14 | End: 2025-08-19

## 2025-08-14 RX ORDER — ACETAMINOPHEN 500 MG/5ML
1000 LIQUID (ML) ORAL ONCE
Refills: 0 | Status: COMPLETED | OUTPATIENT
Start: 2025-08-14 | End: 2025-08-14

## 2025-08-14 RX ORDER — INSULIN LISPRO 100 U/ML
INJECTION, SOLUTION INTRAVENOUS; SUBCUTANEOUS
Refills: 0 | Status: DISCONTINUED | OUTPATIENT
Start: 2025-08-14 | End: 2025-08-14

## 2025-08-14 RX ORDER — INSULIN LISPRO 100 U/ML
10 INJECTION, SOLUTION INTRAVENOUS; SUBCUTANEOUS
Refills: 0 | Status: DISCONTINUED | OUTPATIENT
Start: 2025-08-14 | End: 2025-08-15

## 2025-08-14 RX ORDER — INSULIN LISPRO 100 U/ML
INJECTION, SOLUTION INTRAVENOUS; SUBCUTANEOUS AT BEDTIME
Refills: 0 | Status: DISCONTINUED | OUTPATIENT
Start: 2025-08-14 | End: 2025-08-19

## 2025-08-14 RX ADMIN — INSULIN LISPRO 10 UNIT(S): 100 INJECTION, SOLUTION INTRAVENOUS; SUBCUTANEOUS at 16:56

## 2025-08-14 RX ADMIN — Medication 100 MILLIEQUIVALENT(S): at 06:13

## 2025-08-14 RX ADMIN — NICOTINE POLACRILEX 1 PATCH: 4 GUM, CHEWING ORAL at 07:44

## 2025-08-14 RX ADMIN — LIDOCAINE HYDROCHLORIDE 1 PATCH: 20 JELLY TOPICAL at 19:55

## 2025-08-14 RX ADMIN — Medication 0.5 MILLIGRAM(S): at 13:31

## 2025-08-14 RX ADMIN — METHYLPREDNISOLONE ACETATE 40 MILLIGRAM(S): 80 INJECTION, SUSPENSION INTRA-ARTICULAR; INTRALESIONAL; INTRAMUSCULAR; SOFT TISSUE at 03:08

## 2025-08-14 RX ADMIN — INSULIN LISPRO 12: 100 INJECTION, SOLUTION INTRAVENOUS; SUBCUTANEOUS at 16:57

## 2025-08-14 RX ADMIN — IPRATROPIUM BROMIDE AND ALBUTEROL SULFATE 3 MILLILITER(S): .5; 2.5 SOLUTION RESPIRATORY (INHALATION) at 08:54

## 2025-08-14 RX ADMIN — HEPARIN SODIUM 5000 UNIT(S): 1000 INJECTION INTRAVENOUS; SUBCUTANEOUS at 05:24

## 2025-08-14 RX ADMIN — IPRATROPIUM BROMIDE AND ALBUTEROL SULFATE 3 MILLILITER(S): .5; 2.5 SOLUTION RESPIRATORY (INHALATION) at 02:11

## 2025-08-14 RX ADMIN — NICOTINE POLACRILEX 1 PATCH: 4 GUM, CHEWING ORAL at 11:00

## 2025-08-14 RX ADMIN — Medication 1 DOSE(S): at 23:08

## 2025-08-14 RX ADMIN — Medication 650 MILLIGRAM(S): at 22:27

## 2025-08-14 RX ADMIN — INSULIN LISPRO 5 UNIT(S): 100 INJECTION, SOLUTION INTRAVENOUS; SUBCUTANEOUS at 07:29

## 2025-08-14 RX ADMIN — Medication 100 MILLIEQUIVALENT(S): at 04:24

## 2025-08-14 RX ADMIN — Medication 0.25 MILLIGRAM(S): at 11:31

## 2025-08-14 RX ADMIN — Medication 100 MILLIEQUIVALENT(S): at 05:25

## 2025-08-14 RX ADMIN — IPRATROPIUM BROMIDE AND ALBUTEROL SULFATE 3 MILLILITER(S): .5; 2.5 SOLUTION RESPIRATORY (INHALATION) at 20:01

## 2025-08-14 RX ADMIN — Medication 25 GRAM(S): at 21:27

## 2025-08-14 RX ADMIN — INSULIN LISPRO 12: 100 INJECTION, SOLUTION INTRAVENOUS; SUBCUTANEOUS at 11:40

## 2025-08-14 RX ADMIN — Medication 12 UNIT(S)/HR: at 00:30

## 2025-08-14 RX ADMIN — Medication 25 GRAM(S): at 05:23

## 2025-08-14 RX ADMIN — LIDOCAINE HYDROCHLORIDE 1 PATCH: 20 JELLY TOPICAL at 23:04

## 2025-08-14 RX ADMIN — ENOXAPARIN SODIUM 40 MILLIGRAM(S): 100 INJECTION SUBCUTANEOUS at 19:02

## 2025-08-14 RX ADMIN — Medication 0.5 MILLIGRAM(S): at 21:00

## 2025-08-14 RX ADMIN — INSULIN LISPRO 4: 100 INJECTION, SOLUTION INTRAVENOUS; SUBCUTANEOUS at 07:33

## 2025-08-14 RX ADMIN — INSULIN LISPRO 5 UNIT(S): 100 INJECTION, SOLUTION INTRAVENOUS; SUBCUTANEOUS at 11:41

## 2025-08-14 RX ADMIN — INSULIN LISPRO 8: 100 INJECTION, SOLUTION INTRAVENOUS; SUBCUTANEOUS at 21:34

## 2025-08-14 RX ADMIN — Medication 1000 MILLIGRAM(S): at 08:30

## 2025-08-14 RX ADMIN — LIDOCAINE HYDROCHLORIDE 1 PATCH: 20 JELLY TOPICAL at 04:30

## 2025-08-14 RX ADMIN — Medication 0.5 MILLIGRAM(S): at 20:02

## 2025-08-14 RX ADMIN — Medication 0.5 MILLIGRAM(S): at 05:50

## 2025-08-14 RX ADMIN — NICOTINE POLACRILEX 1 PATCH: 4 GUM, CHEWING ORAL at 11:31

## 2025-08-14 RX ADMIN — NICOTINE POLACRILEX 1 PATCH: 4 GUM, CHEWING ORAL at 19:55

## 2025-08-14 RX ADMIN — Medication 40 MILLIGRAM(S): at 11:29

## 2025-08-14 RX ADMIN — Medication 400 MILLIGRAM(S): at 08:00

## 2025-08-14 RX ADMIN — Medication 1 APPLICATION(S): at 05:23

## 2025-08-14 RX ADMIN — INSULIN GLARGINE-YFGN 20 UNIT(S): 100 INJECTION, SOLUTION SUBCUTANEOUS at 05:51

## 2025-08-14 RX ADMIN — LIDOCAINE HYDROCHLORIDE 1 PATCH: 20 JELLY TOPICAL at 11:32

## 2025-08-14 RX ADMIN — Medication 25 GRAM(S): at 13:23

## 2025-08-14 RX ADMIN — METHYLPREDNISOLONE ACETATE 40 MILLIGRAM(S): 80 INJECTION, SUSPENSION INTRA-ARTICULAR; INTRALESIONAL; INTRAMUSCULAR; SOFT TISSUE at 11:31

## 2025-08-14 RX ADMIN — Medication 0.5 MILLIGRAM(S): at 21:05

## 2025-08-14 RX ADMIN — INSULIN GLARGINE-YFGN 40 UNIT(S): 100 INJECTION, SOLUTION SUBCUTANEOUS at 21:35

## 2025-08-14 RX ADMIN — Medication 6 UNIT(S): at 20:53

## 2025-08-14 RX ADMIN — Medication 650 MILLIGRAM(S): at 23:27

## 2025-08-14 RX ADMIN — INSULIN GLARGINE-YFGN 20 UNIT(S): 100 INJECTION, SOLUTION SUBCUTANEOUS at 15:12

## 2025-08-14 RX ADMIN — IPRATROPIUM BROMIDE AND ALBUTEROL SULFATE 3 MILLILITER(S): .5; 2.5 SOLUTION RESPIRATORY (INHALATION) at 15:41

## 2025-08-14 RX ADMIN — METHYLPREDNISOLONE ACETATE 40 MILLIGRAM(S): 80 INJECTION, SUSPENSION INTRA-ARTICULAR; INTRALESIONAL; INTRAMUSCULAR; SOFT TISSUE at 19:02

## 2025-08-15 LAB
-  AMOXICILLIN/CLAVULANIC ACID: SIGNIFICANT CHANGE UP
-  AMPICILLIN/SULBACTAM: SIGNIFICANT CHANGE UP
-  AMPICILLIN: SIGNIFICANT CHANGE UP
-  AZTREONAM: SIGNIFICANT CHANGE UP
-  CEFAZOLIN: SIGNIFICANT CHANGE UP
-  CEFEPIME: SIGNIFICANT CHANGE UP
-  CEFOXITIN: SIGNIFICANT CHANGE UP
-  CEFTRIAXONE: SIGNIFICANT CHANGE UP
-  CEFUROXIME: SIGNIFICANT CHANGE UP
-  CIPROFLOXACIN: SIGNIFICANT CHANGE UP
-  ERTAPENEM: SIGNIFICANT CHANGE UP
-  GENTAMICIN: SIGNIFICANT CHANGE UP
-  IMIPENEM: SIGNIFICANT CHANGE UP
-  LEVOFLOXACIN: SIGNIFICANT CHANGE UP
-  MEROPENEM: SIGNIFICANT CHANGE UP
-  NITROFURANTOIN: SIGNIFICANT CHANGE UP
-  PIPERACILLIN/TAZOBACTAM: SIGNIFICANT CHANGE UP
-  TIGECYCLINE: SIGNIFICANT CHANGE UP
-  TOBRAMYCIN: SIGNIFICANT CHANGE UP
-  TRIMETHOPRIM/SULFAMETHOXAZOLE: SIGNIFICANT CHANGE UP
ALBUMIN SERPL ELPH-MCNC: 2.2 G/DL — LOW (ref 3.5–5)
ALP SERPL-CCNC: 65 U/L — SIGNIFICANT CHANGE UP (ref 40–120)
ALT FLD-CCNC: 31 U/L DA — SIGNIFICANT CHANGE UP (ref 10–60)
ANION GAP SERPL CALC-SCNC: 0 MMOL/L — LOW (ref 5–17)
AST SERPL-CCNC: 16 U/L — SIGNIFICANT CHANGE UP (ref 10–40)
BASE EXCESS BLDA CALC-SCNC: 12.5 MMOL/L — HIGH (ref -2–3)
BASOPHILS # BLD AUTO: 0.01 K/UL — SIGNIFICANT CHANGE UP (ref 0–0.2)
BASOPHILS NFR BLD AUTO: 0.1 % — SIGNIFICANT CHANGE UP (ref 0–2)
BILIRUB SERPL-MCNC: 0.3 MG/DL — SIGNIFICANT CHANGE UP (ref 0.2–1.2)
BLOOD GAS COMMENTS ARTERIAL: SIGNIFICANT CHANGE UP
BUN SERPL-MCNC: 27 MG/DL — HIGH (ref 7–18)
CALCIUM SERPL-MCNC: 8.5 MG/DL — SIGNIFICANT CHANGE UP (ref 8.4–10.5)
CHLORIDE SERPL-SCNC: 94 MMOL/L — LOW (ref 96–108)
CO2 SERPL-SCNC: 38 MMOL/L — HIGH (ref 22–31)
CREAT SERPL-MCNC: 1.04 MG/DL — SIGNIFICANT CHANGE UP (ref 0.5–1.3)
CULTURE RESULTS: ABNORMAL
EGFR: 81 ML/MIN/1.73M2 — SIGNIFICANT CHANGE UP
EGFR: 81 ML/MIN/1.73M2 — SIGNIFICANT CHANGE UP
EOSINOPHIL # BLD AUTO: 0 K/UL — SIGNIFICANT CHANGE UP (ref 0–0.5)
EOSINOPHIL NFR BLD AUTO: 0 % — SIGNIFICANT CHANGE UP (ref 0–6)
GLUCOSE BLDC GLUCOMTR-MCNC: 329 MG/DL — HIGH (ref 70–99)
GLUCOSE BLDC GLUCOMTR-MCNC: 352 MG/DL — HIGH (ref 70–99)
GLUCOSE BLDC GLUCOMTR-MCNC: 354 MG/DL — HIGH (ref 70–99)
GLUCOSE BLDC GLUCOMTR-MCNC: 370 MG/DL — HIGH (ref 70–99)
GLUCOSE BLDC GLUCOMTR-MCNC: 370 MG/DL — HIGH (ref 70–99)
GLUCOSE BLDC GLUCOMTR-MCNC: 375 MG/DL — HIGH (ref 70–99)
GLUCOSE BLDC GLUCOMTR-MCNC: 432 MG/DL — HIGH (ref 70–99)
GLUCOSE SERPL-MCNC: 384 MG/DL — HIGH (ref 70–99)
HCO3 BLDA-SCNC: 39 MMOL/L — HIGH (ref 21–28)
HCT VFR BLD CALC: 31.8 % — LOW (ref 39–50)
HGB BLD-MCNC: 9.8 G/DL — LOW (ref 13–17)
HOROWITZ INDEX BLDA+IHG-RTO: 40 — SIGNIFICANT CHANGE UP
IMM GRANULOCYTES NFR BLD AUTO: 0.8 % — SIGNIFICANT CHANGE UP (ref 0–0.9)
LYMPHOCYTES # BLD AUTO: 0.53 K/UL — LOW (ref 1–3.3)
LYMPHOCYTES # BLD AUTO: 5.5 % — LOW (ref 13–44)
MAGNESIUM SERPL-MCNC: 2.5 MG/DL — SIGNIFICANT CHANGE UP (ref 1.6–2.6)
MCHC RBC-ENTMCNC: 25.9 PG — LOW (ref 27–34)
MCHC RBC-ENTMCNC: 30.8 G/DL — LOW (ref 32–36)
MCV RBC AUTO: 83.9 FL — SIGNIFICANT CHANGE UP (ref 80–100)
METHOD TYPE: SIGNIFICANT CHANGE UP
MONOCYTES # BLD AUTO: 0.41 K/UL — SIGNIFICANT CHANGE UP (ref 0–0.9)
MONOCYTES NFR BLD AUTO: 4.3 % — SIGNIFICANT CHANGE UP (ref 2–14)
NEUTROPHILS # BLD AUTO: 8.54 K/UL — HIGH (ref 1.8–7.4)
NEUTROPHILS NFR BLD AUTO: 89.3 % — HIGH (ref 43–77)
NRBC BLD AUTO-RTO: 0 /100 WBCS — SIGNIFICANT CHANGE UP (ref 0–0)
ORGANISM # SPEC MICROSCOPIC CNT: ABNORMAL
ORGANISM # SPEC MICROSCOPIC CNT: ABNORMAL
PCO2 BLDA: 56 MMHG — HIGH (ref 35–48)
PH BLDA: 7.45 — SIGNIFICANT CHANGE UP (ref 7.35–7.45)
PHOSPHATE SERPL-MCNC: 3.3 MG/DL — SIGNIFICANT CHANGE UP (ref 2.5–4.5)
PLATELET # BLD AUTO: 220 K/UL — SIGNIFICANT CHANGE UP (ref 150–400)
PO2 BLDA: 79 MMHG — LOW (ref 83–108)
POTASSIUM SERPL-MCNC: 4.3 MMOL/L — SIGNIFICANT CHANGE UP (ref 3.5–5.3)
POTASSIUM SERPL-SCNC: 4.3 MMOL/L — SIGNIFICANT CHANGE UP (ref 3.5–5.3)
PROT SERPL-MCNC: 6.6 G/DL — SIGNIFICANT CHANGE UP (ref 6–8.3)
RBC # BLD: 3.79 M/UL — LOW (ref 4.2–5.8)
RBC # FLD: 13.9 % — SIGNIFICANT CHANGE UP (ref 10.3–14.5)
SAO2 % BLDA: 98 % — SIGNIFICANT CHANGE UP
SODIUM SERPL-SCNC: 132 MMOL/L — LOW (ref 135–145)
SPECIMEN SOURCE: SIGNIFICANT CHANGE UP
WBC # BLD: 9.57 K/UL — SIGNIFICANT CHANGE UP (ref 3.8–10.5)
WBC # FLD AUTO: 9.57 K/UL — SIGNIFICANT CHANGE UP (ref 3.8–10.5)

## 2025-08-15 PROCEDURE — 99233 SBSQ HOSP IP/OBS HIGH 50: CPT | Mod: GC

## 2025-08-15 RX ORDER — ASPIRIN 325 MG
81 TABLET ORAL DAILY
Refills: 0 | Status: DISCONTINUED | OUTPATIENT
Start: 2025-08-15 | End: 2025-08-19

## 2025-08-15 RX ORDER — METHYLPREDNISOLONE ACETATE 80 MG/ML
40 INJECTION, SUSPENSION INTRA-ARTICULAR; INTRALESIONAL; INTRAMUSCULAR; SOFT TISSUE ONCE
Refills: 0 | Status: COMPLETED | OUTPATIENT
Start: 2025-08-16 | End: 2025-08-16

## 2025-08-15 RX ORDER — INSULIN LISPRO 100 U/ML
14 INJECTION, SOLUTION INTRAVENOUS; SUBCUTANEOUS
Refills: 0 | Status: DISCONTINUED | OUTPATIENT
Start: 2025-08-15 | End: 2025-08-16

## 2025-08-15 RX ORDER — FUROSEMIDE 10 MG/ML
40 INJECTION INTRAMUSCULAR; INTRAVENOUS DAILY
Refills: 0 | Status: DISCONTINUED | OUTPATIENT
Start: 2025-08-15 | End: 2025-08-19

## 2025-08-15 RX ORDER — INSULIN GLARGINE-YFGN 100 [IU]/ML
45 INJECTION, SOLUTION SUBCUTANEOUS AT BEDTIME
Refills: 0 | Status: DISCONTINUED | OUTPATIENT
Start: 2025-08-15 | End: 2025-08-16

## 2025-08-15 RX ORDER — FINASTERIDE 1 MG/1
5 TABLET, FILM COATED ORAL DAILY
Refills: 0 | Status: DISCONTINUED | OUTPATIENT
Start: 2025-08-15 | End: 2025-08-19

## 2025-08-15 RX ORDER — ATORVASTATIN CALCIUM 80 MG/1
20 TABLET, FILM COATED ORAL AT BEDTIME
Refills: 0 | Status: DISCONTINUED | OUTPATIENT
Start: 2025-08-15 | End: 2025-08-19

## 2025-08-15 RX ORDER — TAMSULOSIN HYDROCHLORIDE 0.4 MG/1
0.4 CAPSULE ORAL AT BEDTIME
Refills: 0 | Status: DISCONTINUED | OUTPATIENT
Start: 2025-08-15 | End: 2025-08-19

## 2025-08-15 RX ADMIN — INSULIN LISPRO 14 UNIT(S): 100 INJECTION, SOLUTION INTRAVENOUS; SUBCUTANEOUS at 16:40

## 2025-08-15 RX ADMIN — INSULIN LISPRO 6: 100 INJECTION, SOLUTION INTRAVENOUS; SUBCUTANEOUS at 22:55

## 2025-08-15 RX ADMIN — IPRATROPIUM BROMIDE AND ALBUTEROL SULFATE 3 MILLILITER(S): .5; 2.5 SOLUTION RESPIRATORY (INHALATION) at 03:25

## 2025-08-15 RX ADMIN — INSULIN LISPRO 10 UNIT(S): 100 INJECTION, SOLUTION INTRAVENOUS; SUBCUTANEOUS at 07:21

## 2025-08-15 RX ADMIN — ENOXAPARIN SODIUM 40 MILLIGRAM(S): 100 INJECTION SUBCUTANEOUS at 18:02

## 2025-08-15 RX ADMIN — NICOTINE POLACRILEX 1 PATCH: 4 GUM, CHEWING ORAL at 11:00

## 2025-08-15 RX ADMIN — TAMSULOSIN HYDROCHLORIDE 0.4 MILLIGRAM(S): 0.4 CAPSULE ORAL at 22:50

## 2025-08-15 RX ADMIN — INSULIN LISPRO 10: 100 INJECTION, SOLUTION INTRAVENOUS; SUBCUTANEOUS at 07:21

## 2025-08-15 RX ADMIN — Medication 1 APPLICATION(S): at 05:28

## 2025-08-15 RX ADMIN — Medication 12 UNIT(S): at 04:55

## 2025-08-15 RX ADMIN — LIDOCAINE HYDROCHLORIDE 1 PATCH: 20 JELLY TOPICAL at 22:36

## 2025-08-15 RX ADMIN — Medication 40 MILLIGRAM(S): at 11:16

## 2025-08-15 RX ADMIN — NICOTINE POLACRILEX 1 PATCH: 4 GUM, CHEWING ORAL at 11:31

## 2025-08-15 RX ADMIN — IPRATROPIUM BROMIDE AND ALBUTEROL SULFATE 3 MILLILITER(S): .5; 2.5 SOLUTION RESPIRATORY (INHALATION) at 09:16

## 2025-08-15 RX ADMIN — LIDOCAINE HYDROCHLORIDE 1 PATCH: 20 JELLY TOPICAL at 11:17

## 2025-08-15 RX ADMIN — Medication 25 GRAM(S): at 22:52

## 2025-08-15 RX ADMIN — Medication 0.25 MILLIGRAM(S): at 11:17

## 2025-08-15 RX ADMIN — INSULIN LISPRO 8: 100 INJECTION, SOLUTION INTRAVENOUS; SUBCUTANEOUS at 16:40

## 2025-08-15 RX ADMIN — IPRATROPIUM BROMIDE AND ALBUTEROL SULFATE 3 MILLILITER(S): .5; 2.5 SOLUTION RESPIRATORY (INHALATION) at 15:38

## 2025-08-15 RX ADMIN — METHYLPREDNISOLONE ACETATE 40 MILLIGRAM(S): 80 INJECTION, SUSPENSION INTRA-ARTICULAR; INTRALESIONAL; INTRAMUSCULAR; SOFT TISSUE at 11:17

## 2025-08-15 RX ADMIN — IPRATROPIUM BROMIDE AND ALBUTEROL SULFATE 3 MILLILITER(S): .5; 2.5 SOLUTION RESPIRATORY (INHALATION) at 20:06

## 2025-08-15 RX ADMIN — NICOTINE POLACRILEX 1 PATCH: 4 GUM, CHEWING ORAL at 07:00

## 2025-08-15 RX ADMIN — Medication 25 GRAM(S): at 05:29

## 2025-08-15 RX ADMIN — Medication 0.5 MILLIGRAM(S): at 22:49

## 2025-08-15 RX ADMIN — ATORVASTATIN CALCIUM 20 MILLIGRAM(S): 80 TABLET, FILM COATED ORAL at 22:49

## 2025-08-15 RX ADMIN — Medication 12 UNIT(S): at 01:36

## 2025-08-15 RX ADMIN — INSULIN LISPRO 10: 100 INJECTION, SOLUTION INTRAVENOUS; SUBCUTANEOUS at 11:08

## 2025-08-15 RX ADMIN — Medication 1 DOSE(S): at 23:02

## 2025-08-15 RX ADMIN — Medication 0.5 MILLIGRAM(S): at 05:28

## 2025-08-15 RX ADMIN — INSULIN GLARGINE-YFGN 45 UNIT(S): 100 INJECTION, SOLUTION SUBCUTANEOUS at 22:52

## 2025-08-15 RX ADMIN — INSULIN LISPRO 10 UNIT(S): 100 INJECTION, SOLUTION INTRAVENOUS; SUBCUTANEOUS at 11:08

## 2025-08-15 RX ADMIN — Medication 25 GRAM(S): at 13:01

## 2025-08-15 RX ADMIN — Medication 0.5 MILLIGRAM(S): at 13:02

## 2025-08-15 RX ADMIN — NICOTINE POLACRILEX 1 PATCH: 4 GUM, CHEWING ORAL at 22:37

## 2025-08-15 RX ADMIN — METHYLPREDNISOLONE ACETATE 40 MILLIGRAM(S): 80 INJECTION, SUSPENSION INTRA-ARTICULAR; INTRALESIONAL; INTRAMUSCULAR; SOFT TISSUE at 02:02

## 2025-08-16 DIAGNOSIS — J96.21 ACUTE AND CHRONIC RESPIRATORY FAILURE WITH HYPOXIA: ICD-10-CM

## 2025-08-16 DIAGNOSIS — N39.0 URINARY TRACT INFECTION, SITE NOT SPECIFIED: ICD-10-CM

## 2025-08-16 DIAGNOSIS — E11.9 TYPE 2 DIABETES MELLITUS WITHOUT COMPLICATIONS: ICD-10-CM

## 2025-08-16 DIAGNOSIS — Z75.8 OTHER PROBLEMS RELATED TO MEDICAL FACILITIES AND OTHER HEALTH CARE: ICD-10-CM

## 2025-08-16 DIAGNOSIS — F41.9 ANXIETY DISORDER, UNSPECIFIED: ICD-10-CM

## 2025-08-16 DIAGNOSIS — J44.1 CHRONIC OBSTRUCTIVE PULMONARY DISEASE WITH (ACUTE) EXACERBATION: ICD-10-CM

## 2025-08-16 DIAGNOSIS — J15.1 PNEUMONIA DUE TO PSEUDOMONAS: ICD-10-CM

## 2025-08-16 DIAGNOSIS — Z71.89 OTHER SPECIFIED COUNSELING: ICD-10-CM

## 2025-08-16 DIAGNOSIS — G47.33 OBSTRUCTIVE SLEEP APNEA (ADULT) (PEDIATRIC): ICD-10-CM

## 2025-08-16 DIAGNOSIS — I27.20 PULMONARY HYPERTENSION, UNSPECIFIED: ICD-10-CM

## 2025-08-16 DIAGNOSIS — I25.10 ATHEROSCLEROTIC HEART DISEASE OF NATIVE CORONARY ARTERY WITHOUT ANGINA PECTORIS: ICD-10-CM

## 2025-08-16 DIAGNOSIS — I50.32 CHRONIC DIASTOLIC (CONGESTIVE) HEART FAILURE: ICD-10-CM

## 2025-08-16 LAB
ANION GAP SERPL CALC-SCNC: 3 MMOL/L — LOW (ref 5–17)
BUN SERPL-MCNC: 25 MG/DL — HIGH (ref 7–18)
CALCIUM SERPL-MCNC: 8.4 MG/DL — SIGNIFICANT CHANGE UP (ref 8.4–10.5)
CHLORIDE SERPL-SCNC: 97 MMOL/L — SIGNIFICANT CHANGE UP (ref 96–108)
CO2 SERPL-SCNC: 34 MMOL/L — HIGH (ref 22–31)
CREAT SERPL-MCNC: 0.86 MG/DL — SIGNIFICANT CHANGE UP (ref 0.5–1.3)
EGFR: 97 ML/MIN/1.73M2 — SIGNIFICANT CHANGE UP
EGFR: 97 ML/MIN/1.73M2 — SIGNIFICANT CHANGE UP
GLUCOSE BLDC GLUCOMTR-MCNC: 259 MG/DL — HIGH (ref 70–99)
GLUCOSE BLDC GLUCOMTR-MCNC: 284 MG/DL — HIGH (ref 70–99)
GLUCOSE BLDC GLUCOMTR-MCNC: 298 MG/DL — HIGH (ref 70–99)
GLUCOSE BLDC GLUCOMTR-MCNC: 315 MG/DL — HIGH (ref 70–99)
GLUCOSE BLDC GLUCOMTR-MCNC: 371 MG/DL — HIGH (ref 70–99)
GLUCOSE SERPL-MCNC: 296 MG/DL — HIGH (ref 70–99)
HCT VFR BLD CALC: 34.5 % — LOW (ref 39–50)
HGB BLD-MCNC: 10.7 G/DL — LOW (ref 13–17)
MAGNESIUM SERPL-MCNC: 2.5 MG/DL — SIGNIFICANT CHANGE UP (ref 1.6–2.6)
MCHC RBC-ENTMCNC: 25.8 PG — LOW (ref 27–34)
MCHC RBC-ENTMCNC: 31 G/DL — LOW (ref 32–36)
MCV RBC AUTO: 83.3 FL — SIGNIFICANT CHANGE UP (ref 80–100)
NRBC BLD AUTO-RTO: 0 /100 WBCS — SIGNIFICANT CHANGE UP (ref 0–0)
PHOSPHATE SERPL-MCNC: 2.9 MG/DL — SIGNIFICANT CHANGE UP (ref 2.5–4.5)
PLATELET # BLD AUTO: 227 K/UL — SIGNIFICANT CHANGE UP (ref 150–400)
POTASSIUM SERPL-MCNC: 4.3 MMOL/L — SIGNIFICANT CHANGE UP (ref 3.5–5.3)
POTASSIUM SERPL-SCNC: 4.3 MMOL/L — SIGNIFICANT CHANGE UP (ref 3.5–5.3)
RBC # BLD: 4.14 M/UL — LOW (ref 4.2–5.8)
RBC # FLD: 14.1 % — SIGNIFICANT CHANGE UP (ref 10.3–14.5)
SODIUM SERPL-SCNC: 134 MMOL/L — LOW (ref 135–145)
WBC # BLD: 10.33 K/UL — SIGNIFICANT CHANGE UP (ref 3.8–10.5)
WBC # FLD AUTO: 10.33 K/UL — SIGNIFICANT CHANGE UP (ref 3.8–10.5)

## 2025-08-16 PROCEDURE — 82803 BLOOD GASES ANY COMBINATION: CPT

## 2025-08-16 PROCEDURE — 86738 MYCOPLASMA ANTIBODY: CPT

## 2025-08-16 PROCEDURE — 87040 BLOOD CULTURE FOR BACTERIA: CPT

## 2025-08-16 PROCEDURE — 81001 URINALYSIS AUTO W/SCOPE: CPT

## 2025-08-16 PROCEDURE — 83930 ASSAY OF BLOOD OSMOLALITY: CPT

## 2025-08-16 PROCEDURE — 87641 MR-STAPH DNA AMP PROBE: CPT

## 2025-08-16 PROCEDURE — 71045 X-RAY EXAM CHEST 1 VIEW: CPT

## 2025-08-16 PROCEDURE — 94660 CPAP INITIATION&MGMT: CPT

## 2025-08-16 PROCEDURE — 87186 SC STD MICRODIL/AGAR DIL: CPT

## 2025-08-16 PROCEDURE — 97162 PT EVAL MOD COMPLEX 30 MIN: CPT

## 2025-08-16 PROCEDURE — 85027 COMPLETE CBC AUTOMATED: CPT

## 2025-08-16 PROCEDURE — 82962 GLUCOSE BLOOD TEST: CPT

## 2025-08-16 PROCEDURE — 87640 STAPH A DNA AMP PROBE: CPT

## 2025-08-16 PROCEDURE — 84132 ASSAY OF SERUM POTASSIUM: CPT

## 2025-08-16 PROCEDURE — 85025 COMPLETE CBC W/AUTO DIFF WBC: CPT

## 2025-08-16 PROCEDURE — 83880 ASSAY OF NATRIURETIC PEPTIDE: CPT

## 2025-08-16 PROCEDURE — 99233 SBSQ HOSP IP/OBS HIGH 50: CPT

## 2025-08-16 PROCEDURE — C8929: CPT

## 2025-08-16 PROCEDURE — 83735 ASSAY OF MAGNESIUM: CPT

## 2025-08-16 PROCEDURE — 36415 COLL VENOUS BLD VENIPUNCTURE: CPT

## 2025-08-16 PROCEDURE — 84295 ASSAY OF SERUM SODIUM: CPT

## 2025-08-16 PROCEDURE — 87150 DNA/RNA AMPLIFIED PROBE: CPT

## 2025-08-16 PROCEDURE — 82010 KETONE BODYS QUAN: CPT

## 2025-08-16 PROCEDURE — 93005 ELECTROCARDIOGRAM TRACING: CPT

## 2025-08-16 PROCEDURE — 83605 ASSAY OF LACTIC ACID: CPT

## 2025-08-16 PROCEDURE — 82330 ASSAY OF CALCIUM: CPT

## 2025-08-16 PROCEDURE — 87899 AGENT NOS ASSAY W/OPTIC: CPT

## 2025-08-16 PROCEDURE — 82947 ASSAY GLUCOSE BLOOD QUANT: CPT

## 2025-08-16 PROCEDURE — 71250 CT THORAX DX C-: CPT

## 2025-08-16 PROCEDURE — 80053 COMPREHEN METABOLIC PANEL: CPT

## 2025-08-16 PROCEDURE — 87077 CULTURE AEROBIC IDENTIFY: CPT

## 2025-08-16 PROCEDURE — 87086 URINE CULTURE/COLONY COUNT: CPT

## 2025-08-16 PROCEDURE — 87637 SARSCOV2&INF A&B&RSV AMP PRB: CPT

## 2025-08-16 PROCEDURE — 94640 AIRWAY INHALATION TREATMENT: CPT

## 2025-08-16 PROCEDURE — 84100 ASSAY OF PHOSPHORUS: CPT

## 2025-08-16 PROCEDURE — 83036 HEMOGLOBIN GLYCOSYLATED A1C: CPT

## 2025-08-16 PROCEDURE — 80048 BASIC METABOLIC PNL TOTAL CA: CPT

## 2025-08-16 PROCEDURE — 82009 KETONE BODYS QUAL: CPT

## 2025-08-16 PROCEDURE — 92526 ORAL FUNCTION THERAPY: CPT

## 2025-08-16 RX ORDER — INSULIN LISPRO 100 U/ML
4 INJECTION, SOLUTION INTRAVENOUS; SUBCUTANEOUS ONCE
Refills: 0 | Status: COMPLETED | OUTPATIENT
Start: 2025-08-16 | End: 2025-08-16

## 2025-08-16 RX ORDER — INSULIN GLARGINE-YFGN 100 [IU]/ML
50 INJECTION, SOLUTION SUBCUTANEOUS AT BEDTIME
Refills: 0 | Status: DISCONTINUED | OUTPATIENT
Start: 2025-08-16 | End: 2025-08-16

## 2025-08-16 RX ORDER — INSULIN LISPRO 100 U/ML
16 INJECTION, SOLUTION INTRAVENOUS; SUBCUTANEOUS
Refills: 0 | Status: DISCONTINUED | OUTPATIENT
Start: 2025-08-16 | End: 2025-08-19

## 2025-08-16 RX ORDER — INSULIN GLARGINE-YFGN 100 [IU]/ML
45 INJECTION, SOLUTION SUBCUTANEOUS AT BEDTIME
Refills: 0 | Status: DISCONTINUED | OUTPATIENT
Start: 2025-08-16 | End: 2025-08-19

## 2025-08-16 RX ADMIN — INSULIN LISPRO 14 UNIT(S): 100 INJECTION, SOLUTION INTRAVENOUS; SUBCUTANEOUS at 11:53

## 2025-08-16 RX ADMIN — Medication 0.5 MILLIGRAM(S): at 05:23

## 2025-08-16 RX ADMIN — Medication 0.5 MILLIGRAM(S): at 14:28

## 2025-08-16 RX ADMIN — TAMSULOSIN HYDROCHLORIDE 0.4 MILLIGRAM(S): 0.4 CAPSULE ORAL at 21:19

## 2025-08-16 RX ADMIN — NICOTINE POLACRILEX 1 PATCH: 4 GUM, CHEWING ORAL at 18:57

## 2025-08-16 RX ADMIN — Medication 1 DOSE(S): at 23:52

## 2025-08-16 RX ADMIN — NICOTINE POLACRILEX 1 PATCH: 4 GUM, CHEWING ORAL at 07:25

## 2025-08-16 RX ADMIN — METHYLPREDNISOLONE ACETATE 40 MILLIGRAM(S): 80 INJECTION, SUSPENSION INTRA-ARTICULAR; INTRALESIONAL; INTRAMUSCULAR; SOFT TISSUE at 05:23

## 2025-08-16 RX ADMIN — ATORVASTATIN CALCIUM 20 MILLIGRAM(S): 80 TABLET, FILM COATED ORAL at 21:19

## 2025-08-16 RX ADMIN — IPRATROPIUM BROMIDE AND ALBUTEROL SULFATE 3 MILLILITER(S): .5; 2.5 SOLUTION RESPIRATORY (INHALATION) at 08:36

## 2025-08-16 RX ADMIN — Medication 0.25 MILLIGRAM(S): at 12:56

## 2025-08-16 RX ADMIN — Medication 650 MILLIGRAM(S): at 17:40

## 2025-08-16 RX ADMIN — Medication 25 GRAM(S): at 05:23

## 2025-08-16 RX ADMIN — Medication 0.5 MILLIGRAM(S): at 21:19

## 2025-08-16 RX ADMIN — Medication 81 MILLIGRAM(S): at 11:54

## 2025-08-16 RX ADMIN — NICOTINE POLACRILEX 1 PATCH: 4 GUM, CHEWING ORAL at 12:57

## 2025-08-16 RX ADMIN — Medication 650 MILLIGRAM(S): at 07:00

## 2025-08-16 RX ADMIN — FUROSEMIDE 40 MILLIGRAM(S): 10 INJECTION INTRAMUSCULAR; INTRAVENOUS at 05:22

## 2025-08-16 RX ADMIN — Medication 25 GRAM(S): at 21:20

## 2025-08-16 RX ADMIN — ENOXAPARIN SODIUM 40 MILLIGRAM(S): 100 INJECTION SUBCUTANEOUS at 19:04

## 2025-08-16 RX ADMIN — INSULIN GLARGINE-YFGN 45 UNIT(S): 100 INJECTION, SOLUTION SUBCUTANEOUS at 21:20

## 2025-08-16 RX ADMIN — INSULIN LISPRO 16 UNIT(S): 100 INJECTION, SOLUTION INTRAVENOUS; SUBCUTANEOUS at 17:13

## 2025-08-16 RX ADMIN — Medication 650 MILLIGRAM(S): at 18:05

## 2025-08-16 RX ADMIN — INSULIN LISPRO 6: 100 INJECTION, SOLUTION INTRAVENOUS; SUBCUTANEOUS at 21:21

## 2025-08-16 RX ADMIN — INSULIN LISPRO 6: 100 INJECTION, SOLUTION INTRAVENOUS; SUBCUTANEOUS at 11:52

## 2025-08-16 RX ADMIN — IPRATROPIUM BROMIDE AND ALBUTEROL SULFATE 3 MILLILITER(S): .5; 2.5 SOLUTION RESPIRATORY (INHALATION) at 20:23

## 2025-08-16 RX ADMIN — Medication 25 GRAM(S): at 14:21

## 2025-08-16 RX ADMIN — LIDOCAINE HYDROCHLORIDE 1 PATCH: 20 JELLY TOPICAL at 18:57

## 2025-08-16 RX ADMIN — INSULIN LISPRO 6: 100 INJECTION, SOLUTION INTRAVENOUS; SUBCUTANEOUS at 17:12

## 2025-08-16 RX ADMIN — FINASTERIDE 5 MILLIGRAM(S): 1 TABLET, FILM COATED ORAL at 11:55

## 2025-08-16 RX ADMIN — IPRATROPIUM BROMIDE AND ALBUTEROL SULFATE 3 MILLILITER(S): .5; 2.5 SOLUTION RESPIRATORY (INHALATION) at 14:09

## 2025-08-16 RX ADMIN — INSULIN LISPRO 4 UNIT(S): 100 INJECTION, SOLUTION INTRAVENOUS; SUBCUTANEOUS at 05:15

## 2025-08-16 RX ADMIN — INSULIN LISPRO 14 UNIT(S): 100 INJECTION, SOLUTION INTRAVENOUS; SUBCUTANEOUS at 08:38

## 2025-08-16 RX ADMIN — Medication 650 MILLIGRAM(S): at 05:40

## 2025-08-16 RX ADMIN — LIDOCAINE HYDROCHLORIDE 1 PATCH: 20 JELLY TOPICAL at 00:00

## 2025-08-16 RX ADMIN — INSULIN LISPRO 6: 100 INJECTION, SOLUTION INTRAVENOUS; SUBCUTANEOUS at 08:38

## 2025-08-16 RX ADMIN — Medication 40 MILLIGRAM(S): at 05:22

## 2025-08-16 RX ADMIN — LIDOCAINE HYDROCHLORIDE 1 PATCH: 20 JELLY TOPICAL at 11:53

## 2025-08-17 LAB
ANION GAP SERPL CALC-SCNC: 2 MMOL/L — LOW (ref 5–17)
BUN SERPL-MCNC: 21 MG/DL — HIGH (ref 7–18)
CALCIUM SERPL-MCNC: 8.1 MG/DL — LOW (ref 8.4–10.5)
CHLORIDE SERPL-SCNC: 95 MMOL/L — LOW (ref 96–108)
CO2 SERPL-SCNC: 37 MMOL/L — HIGH (ref 22–31)
CREAT SERPL-MCNC: 0.89 MG/DL — SIGNIFICANT CHANGE UP (ref 0.5–1.3)
CULTURE RESULTS: SIGNIFICANT CHANGE UP
EGFR: 96 ML/MIN/1.73M2 — SIGNIFICANT CHANGE UP
EGFR: 96 ML/MIN/1.73M2 — SIGNIFICANT CHANGE UP
GLUCOSE BLDC GLUCOMTR-MCNC: 255 MG/DL — HIGH (ref 70–99)
GLUCOSE BLDC GLUCOMTR-MCNC: 269 MG/DL — HIGH (ref 70–99)
GLUCOSE BLDC GLUCOMTR-MCNC: 316 MG/DL — HIGH (ref 70–99)
GLUCOSE BLDC GLUCOMTR-MCNC: 326 MG/DL — HIGH (ref 70–99)
GLUCOSE SERPL-MCNC: 279 MG/DL — HIGH (ref 70–99)
HCT VFR BLD CALC: 35.1 % — LOW (ref 39–50)
HGB BLD-MCNC: 10.6 G/DL — LOW (ref 13–17)
MAGNESIUM SERPL-MCNC: 2.4 MG/DL — SIGNIFICANT CHANGE UP (ref 1.6–2.6)
MCHC RBC-ENTMCNC: 25.4 PG — LOW (ref 27–34)
MCHC RBC-ENTMCNC: 30.2 G/DL — LOW (ref 32–36)
MCV RBC AUTO: 84.2 FL — SIGNIFICANT CHANGE UP (ref 80–100)
NRBC BLD AUTO-RTO: 0 /100 WBCS — SIGNIFICANT CHANGE UP (ref 0–0)
PHOSPHATE SERPL-MCNC: 3.4 MG/DL — SIGNIFICANT CHANGE UP (ref 2.5–4.5)
PLATELET # BLD AUTO: 216 K/UL — SIGNIFICANT CHANGE UP (ref 150–400)
POTASSIUM SERPL-MCNC: 4.2 MMOL/L — SIGNIFICANT CHANGE UP (ref 3.5–5.3)
POTASSIUM SERPL-SCNC: 4.2 MMOL/L — SIGNIFICANT CHANGE UP (ref 3.5–5.3)
RBC # BLD: 4.17 M/UL — LOW (ref 4.2–5.8)
RBC # FLD: 13.9 % — SIGNIFICANT CHANGE UP (ref 10.3–14.5)
SODIUM SERPL-SCNC: 134 MMOL/L — LOW (ref 135–145)
SPECIMEN SOURCE: SIGNIFICANT CHANGE UP
WBC # BLD: 8.68 K/UL — SIGNIFICANT CHANGE UP (ref 3.8–10.5)
WBC # FLD AUTO: 8.68 K/UL — SIGNIFICANT CHANGE UP (ref 3.8–10.5)

## 2025-08-17 PROCEDURE — 99233 SBSQ HOSP IP/OBS HIGH 50: CPT

## 2025-08-17 RX ORDER — IBUPROFEN 200 MG
600 TABLET ORAL ONCE
Refills: 0 | Status: COMPLETED | OUTPATIENT
Start: 2025-08-17 | End: 2025-08-17

## 2025-08-17 RX ADMIN — INSULIN LISPRO 8: 100 INJECTION, SOLUTION INTRAVENOUS; SUBCUTANEOUS at 17:06

## 2025-08-17 RX ADMIN — LIDOCAINE HYDROCHLORIDE 1 PATCH: 20 JELLY TOPICAL at 12:03

## 2025-08-17 RX ADMIN — Medication 25 GRAM(S): at 05:27

## 2025-08-17 RX ADMIN — INSULIN LISPRO 4: 100 INJECTION, SOLUTION INTRAVENOUS; SUBCUTANEOUS at 21:22

## 2025-08-17 RX ADMIN — Medication 0.5 MILLIGRAM(S): at 14:38

## 2025-08-17 RX ADMIN — INSULIN LISPRO 6: 100 INJECTION, SOLUTION INTRAVENOUS; SUBCUTANEOUS at 08:36

## 2025-08-17 RX ADMIN — Medication 0.5 MILLIGRAM(S): at 05:27

## 2025-08-17 RX ADMIN — Medication 600 MILLIGRAM(S): at 19:35

## 2025-08-17 RX ADMIN — INSULIN LISPRO 16 UNIT(S): 100 INJECTION, SOLUTION INTRAVENOUS; SUBCUTANEOUS at 17:07

## 2025-08-17 RX ADMIN — IPRATROPIUM BROMIDE AND ALBUTEROL SULFATE 3 MILLILITER(S): .5; 2.5 SOLUTION RESPIRATORY (INHALATION) at 08:25

## 2025-08-17 RX ADMIN — TAMSULOSIN HYDROCHLORIDE 0.4 MILLIGRAM(S): 0.4 CAPSULE ORAL at 21:21

## 2025-08-17 RX ADMIN — FUROSEMIDE 40 MILLIGRAM(S): 10 INJECTION INTRAMUSCULAR; INTRAVENOUS at 05:27

## 2025-08-17 RX ADMIN — Medication 650 MILLIGRAM(S): at 13:35

## 2025-08-17 RX ADMIN — INSULIN LISPRO 16 UNIT(S): 100 INJECTION, SOLUTION INTRAVENOUS; SUBCUTANEOUS at 08:36

## 2025-08-17 RX ADMIN — NICOTINE POLACRILEX 1 PATCH: 4 GUM, CHEWING ORAL at 12:14

## 2025-08-17 RX ADMIN — Medication 25 GRAM(S): at 21:22

## 2025-08-17 RX ADMIN — ATORVASTATIN CALCIUM 20 MILLIGRAM(S): 80 TABLET, FILM COATED ORAL at 21:21

## 2025-08-17 RX ADMIN — INSULIN GLARGINE-YFGN 45 UNIT(S): 100 INJECTION, SOLUTION SUBCUTANEOUS at 21:22

## 2025-08-17 RX ADMIN — LIDOCAINE HYDROCHLORIDE 1 PATCH: 20 JELLY TOPICAL at 00:00

## 2025-08-17 RX ADMIN — Medication 650 MILLIGRAM(S): at 06:41

## 2025-08-17 RX ADMIN — Medication 600 MILLIGRAM(S): at 20:32

## 2025-08-17 RX ADMIN — Medication 0.25 MILLIGRAM(S): at 12:06

## 2025-08-17 RX ADMIN — ENOXAPARIN SODIUM 40 MILLIGRAM(S): 100 INJECTION SUBCUTANEOUS at 17:06

## 2025-08-17 RX ADMIN — NICOTINE POLACRILEX 1 PATCH: 4 GUM, CHEWING ORAL at 08:35

## 2025-08-17 RX ADMIN — Medication 650 MILLIGRAM(S): at 12:07

## 2025-08-17 RX ADMIN — FINASTERIDE 5 MILLIGRAM(S): 1 TABLET, FILM COATED ORAL at 12:03

## 2025-08-17 RX ADMIN — Medication 25 GRAM(S): at 14:39

## 2025-08-17 RX ADMIN — INSULIN LISPRO 6: 100 INJECTION, SOLUTION INTRAVENOUS; SUBCUTANEOUS at 12:49

## 2025-08-17 RX ADMIN — IPRATROPIUM BROMIDE AND ALBUTEROL SULFATE 3 MILLILITER(S): .5; 2.5 SOLUTION RESPIRATORY (INHALATION) at 03:28

## 2025-08-17 RX ADMIN — Medication 0.5 MILLIGRAM(S): at 21:21

## 2025-08-17 RX ADMIN — Medication 650 MILLIGRAM(S): at 05:26

## 2025-08-17 RX ADMIN — INSULIN LISPRO 16 UNIT(S): 100 INJECTION, SOLUTION INTRAVENOUS; SUBCUTANEOUS at 12:49

## 2025-08-17 RX ADMIN — LIDOCAINE HYDROCHLORIDE 1 PATCH: 20 JELLY TOPICAL at 23:48

## 2025-08-17 RX ADMIN — Medication 40 MILLIGRAM(S): at 05:26

## 2025-08-17 RX ADMIN — IPRATROPIUM BROMIDE AND ALBUTEROL SULFATE 3 MILLILITER(S): .5; 2.5 SOLUTION RESPIRATORY (INHALATION) at 20:11

## 2025-08-17 RX ADMIN — LIDOCAINE HYDROCHLORIDE 1 PATCH: 20 JELLY TOPICAL at 18:42

## 2025-08-17 RX ADMIN — Medication 81 MILLIGRAM(S): at 12:03

## 2025-08-18 LAB
ANION GAP SERPL CALC-SCNC: 5 MMOL/L — SIGNIFICANT CHANGE UP (ref 5–17)
BUN SERPL-MCNC: 19 MG/DL — HIGH (ref 7–18)
CALCIUM SERPL-MCNC: 8.3 MG/DL — LOW (ref 8.4–10.5)
CHLORIDE SERPL-SCNC: 94 MMOL/L — LOW (ref 96–108)
CO2 SERPL-SCNC: 35 MMOL/L — HIGH (ref 22–31)
CREAT SERPL-MCNC: 0.98 MG/DL — SIGNIFICANT CHANGE UP (ref 0.5–1.3)
EGFR: 87 ML/MIN/1.73M2 — SIGNIFICANT CHANGE UP
EGFR: 87 ML/MIN/1.73M2 — SIGNIFICANT CHANGE UP
GLUCOSE BLDC GLUCOMTR-MCNC: 230 MG/DL — HIGH (ref 70–99)
GLUCOSE BLDC GLUCOMTR-MCNC: 247 MG/DL — HIGH (ref 70–99)
GLUCOSE BLDC GLUCOMTR-MCNC: 294 MG/DL — HIGH (ref 70–99)
GLUCOSE BLDC GLUCOMTR-MCNC: 299 MG/DL — HIGH (ref 70–99)
GLUCOSE SERPL-MCNC: 274 MG/DL — HIGH (ref 70–99)
HCT VFR BLD CALC: 37.4 % — LOW (ref 39–50)
HGB BLD-MCNC: 11.7 G/DL — LOW (ref 13–17)
MAGNESIUM SERPL-MCNC: 2.2 MG/DL — SIGNIFICANT CHANGE UP (ref 1.6–2.6)
MCHC RBC-ENTMCNC: 25.7 PG — LOW (ref 27–34)
MCHC RBC-ENTMCNC: 31.3 G/DL — LOW (ref 32–36)
MCV RBC AUTO: 82.2 FL — SIGNIFICANT CHANGE UP (ref 80–100)
NRBC BLD AUTO-RTO: 0 /100 WBCS — SIGNIFICANT CHANGE UP (ref 0–0)
PHOSPHATE SERPL-MCNC: 3.5 MG/DL — SIGNIFICANT CHANGE UP (ref 2.5–4.5)
PLATELET # BLD AUTO: 223 K/UL — SIGNIFICANT CHANGE UP (ref 150–400)
POTASSIUM SERPL-MCNC: 4 MMOL/L — SIGNIFICANT CHANGE UP (ref 3.5–5.3)
POTASSIUM SERPL-SCNC: 4 MMOL/L — SIGNIFICANT CHANGE UP (ref 3.5–5.3)
RBC # BLD: 4.55 M/UL — SIGNIFICANT CHANGE UP (ref 4.2–5.8)
RBC # FLD: 14.1 % — SIGNIFICANT CHANGE UP (ref 10.3–14.5)
SODIUM SERPL-SCNC: 134 MMOL/L — LOW (ref 135–145)
WBC # BLD: 9.32 K/UL — SIGNIFICANT CHANGE UP (ref 3.8–10.5)
WBC # FLD AUTO: 9.32 K/UL — SIGNIFICANT CHANGE UP (ref 3.8–10.5)

## 2025-08-18 PROCEDURE — 84295 ASSAY OF SERUM SODIUM: CPT

## 2025-08-18 PROCEDURE — 82009 KETONE BODYS QUAL: CPT

## 2025-08-18 PROCEDURE — 83735 ASSAY OF MAGNESIUM: CPT

## 2025-08-18 PROCEDURE — 80053 COMPREHEN METABOLIC PANEL: CPT

## 2025-08-18 PROCEDURE — 83930 ASSAY OF BLOOD OSMOLALITY: CPT

## 2025-08-18 PROCEDURE — 83036 HEMOGLOBIN GLYCOSYLATED A1C: CPT

## 2025-08-18 PROCEDURE — 85027 COMPLETE CBC AUTOMATED: CPT

## 2025-08-18 PROCEDURE — 81001 URINALYSIS AUTO W/SCOPE: CPT

## 2025-08-18 PROCEDURE — 71250 CT THORAX DX C-: CPT

## 2025-08-18 PROCEDURE — 87150 DNA/RNA AMPLIFIED PROBE: CPT

## 2025-08-18 PROCEDURE — 80048 BASIC METABOLIC PNL TOTAL CA: CPT

## 2025-08-18 PROCEDURE — 82330 ASSAY OF CALCIUM: CPT

## 2025-08-18 PROCEDURE — 87640 STAPH A DNA AMP PROBE: CPT

## 2025-08-18 PROCEDURE — 86738 MYCOPLASMA ANTIBODY: CPT

## 2025-08-18 PROCEDURE — 84100 ASSAY OF PHOSPHORUS: CPT

## 2025-08-18 PROCEDURE — 87077 CULTURE AEROBIC IDENTIFY: CPT

## 2025-08-18 PROCEDURE — 97162 PT EVAL MOD COMPLEX 30 MIN: CPT

## 2025-08-18 PROCEDURE — 85025 COMPLETE CBC W/AUTO DIFF WBC: CPT

## 2025-08-18 PROCEDURE — 71045 X-RAY EXAM CHEST 1 VIEW: CPT

## 2025-08-18 PROCEDURE — 92526 ORAL FUNCTION THERAPY: CPT

## 2025-08-18 PROCEDURE — 87899 AGENT NOS ASSAY W/OPTIC: CPT

## 2025-08-18 PROCEDURE — 94640 AIRWAY INHALATION TREATMENT: CPT

## 2025-08-18 PROCEDURE — 83880 ASSAY OF NATRIURETIC PEPTIDE: CPT

## 2025-08-18 PROCEDURE — 36415 COLL VENOUS BLD VENIPUNCTURE: CPT

## 2025-08-18 PROCEDURE — 82803 BLOOD GASES ANY COMBINATION: CPT

## 2025-08-18 PROCEDURE — 82947 ASSAY GLUCOSE BLOOD QUANT: CPT

## 2025-08-18 PROCEDURE — 94660 CPAP INITIATION&MGMT: CPT

## 2025-08-18 PROCEDURE — 87040 BLOOD CULTURE FOR BACTERIA: CPT

## 2025-08-18 PROCEDURE — 87086 URINE CULTURE/COLONY COUNT: CPT

## 2025-08-18 PROCEDURE — 87641 MR-STAPH DNA AMP PROBE: CPT

## 2025-08-18 PROCEDURE — C8929: CPT

## 2025-08-18 PROCEDURE — 84132 ASSAY OF SERUM POTASSIUM: CPT

## 2025-08-18 PROCEDURE — 87637 SARSCOV2&INF A&B&RSV AMP PRB: CPT

## 2025-08-18 PROCEDURE — 93005 ELECTROCARDIOGRAM TRACING: CPT

## 2025-08-18 PROCEDURE — 87186 SC STD MICRODIL/AGAR DIL: CPT

## 2025-08-18 PROCEDURE — 82010 KETONE BODYS QUAN: CPT

## 2025-08-18 PROCEDURE — 83605 ASSAY OF LACTIC ACID: CPT

## 2025-08-18 PROCEDURE — 82962 GLUCOSE BLOOD TEST: CPT

## 2025-08-18 RX ORDER — ALPRAZOLAM 0.5 MG
0.5 TABLET, EXTENDED RELEASE 24 HR ORAL THREE TIMES A DAY
Refills: 0 | Status: DISCONTINUED | OUTPATIENT
Start: 2025-08-18 | End: 2025-08-19

## 2025-08-18 RX ADMIN — FUROSEMIDE 40 MILLIGRAM(S): 10 INJECTION INTRAMUSCULAR; INTRAVENOUS at 05:02

## 2025-08-18 RX ADMIN — INSULIN LISPRO 16 UNIT(S): 100 INJECTION, SOLUTION INTRAVENOUS; SUBCUTANEOUS at 11:23

## 2025-08-18 RX ADMIN — INSULIN GLARGINE-YFGN 45 UNIT(S): 100 INJECTION, SOLUTION SUBCUTANEOUS at 21:29

## 2025-08-18 RX ADMIN — INSULIN LISPRO 16 UNIT(S): 100 INJECTION, SOLUTION INTRAVENOUS; SUBCUTANEOUS at 08:28

## 2025-08-18 RX ADMIN — Medication 25 GRAM(S): at 21:29

## 2025-08-18 RX ADMIN — INSULIN LISPRO 4: 100 INJECTION, SOLUTION INTRAVENOUS; SUBCUTANEOUS at 11:23

## 2025-08-18 RX ADMIN — Medication 40 MILLIGRAM(S): at 05:02

## 2025-08-18 RX ADMIN — Medication 0.5 MILLIGRAM(S): at 21:30

## 2025-08-18 RX ADMIN — Medication 0.5 MILLIGRAM(S): at 13:25

## 2025-08-18 RX ADMIN — INSULIN LISPRO 4: 100 INJECTION, SOLUTION INTRAVENOUS; SUBCUTANEOUS at 17:14

## 2025-08-18 RX ADMIN — INSULIN LISPRO 2: 100 INJECTION, SOLUTION INTRAVENOUS; SUBCUTANEOUS at 21:29

## 2025-08-18 RX ADMIN — IPRATROPIUM BROMIDE AND ALBUTEROL SULFATE 3 MILLILITER(S): .5; 2.5 SOLUTION RESPIRATORY (INHALATION) at 08:14

## 2025-08-18 RX ADMIN — Medication 25 GRAM(S): at 13:25

## 2025-08-18 RX ADMIN — Medication 0.25 MILLIGRAM(S): at 11:26

## 2025-08-18 RX ADMIN — Medication 25 GRAM(S): at 05:04

## 2025-08-18 RX ADMIN — Medication 1 DOSE(S): at 11:22

## 2025-08-18 RX ADMIN — IPRATROPIUM BROMIDE AND ALBUTEROL SULFATE 3 MILLILITER(S): .5; 2.5 SOLUTION RESPIRATORY (INHALATION) at 21:04

## 2025-08-18 RX ADMIN — ENOXAPARIN SODIUM 40 MILLIGRAM(S): 100 INJECTION SUBCUTANEOUS at 17:31

## 2025-08-18 RX ADMIN — FINASTERIDE 5 MILLIGRAM(S): 1 TABLET, FILM COATED ORAL at 11:24

## 2025-08-18 RX ADMIN — IPRATROPIUM BROMIDE AND ALBUTEROL SULFATE 3 MILLILITER(S): .5; 2.5 SOLUTION RESPIRATORY (INHALATION) at 15:00

## 2025-08-18 RX ADMIN — INSULIN LISPRO 6: 100 INJECTION, SOLUTION INTRAVENOUS; SUBCUTANEOUS at 08:27

## 2025-08-18 RX ADMIN — Medication 650 MILLIGRAM(S): at 11:27

## 2025-08-18 RX ADMIN — TAMSULOSIN HYDROCHLORIDE 0.4 MILLIGRAM(S): 0.4 CAPSULE ORAL at 21:30

## 2025-08-18 RX ADMIN — INSULIN LISPRO 16 UNIT(S): 100 INJECTION, SOLUTION INTRAVENOUS; SUBCUTANEOUS at 17:15

## 2025-08-18 RX ADMIN — Medication 81 MILLIGRAM(S): at 11:24

## 2025-08-18 RX ADMIN — Medication 650 MILLIGRAM(S): at 17:17

## 2025-08-18 RX ADMIN — Medication 0.5 MILLIGRAM(S): at 05:01

## 2025-08-18 RX ADMIN — ATORVASTATIN CALCIUM 20 MILLIGRAM(S): 80 TABLET, FILM COATED ORAL at 21:30

## 2025-08-19 ENCOUNTER — TRANSCRIPTION ENCOUNTER (OUTPATIENT)
Age: 65
End: 2025-08-19

## 2025-08-19 VITALS
RESPIRATION RATE: 18 BRPM | DIASTOLIC BLOOD PRESSURE: 68 MMHG | HEART RATE: 84 BPM | SYSTOLIC BLOOD PRESSURE: 111 MMHG | TEMPERATURE: 98 F | OXYGEN SATURATION: 98 %

## 2025-08-19 LAB
CULTURE RESULTS: SIGNIFICANT CHANGE UP
CULTURE RESULTS: SIGNIFICANT CHANGE UP
GLUCOSE BLDC GLUCOMTR-MCNC: 171 MG/DL — HIGH (ref 70–99)
GLUCOSE BLDC GLUCOMTR-MCNC: 233 MG/DL — HIGH (ref 70–99)
GLUCOSE BLDC GLUCOMTR-MCNC: 295 MG/DL — HIGH (ref 70–99)
SPECIMEN SOURCE: SIGNIFICANT CHANGE UP
SPECIMEN SOURCE: SIGNIFICANT CHANGE UP

## 2025-08-19 PROCEDURE — 84295 ASSAY OF SERUM SODIUM: CPT

## 2025-08-19 PROCEDURE — 83880 ASSAY OF NATRIURETIC PEPTIDE: CPT

## 2025-08-19 PROCEDURE — 82947 ASSAY GLUCOSE BLOOD QUANT: CPT

## 2025-08-19 PROCEDURE — 94660 CPAP INITIATION&MGMT: CPT

## 2025-08-19 PROCEDURE — 87077 CULTURE AEROBIC IDENTIFY: CPT

## 2025-08-19 PROCEDURE — 87150 DNA/RNA AMPLIFIED PROBE: CPT

## 2025-08-19 PROCEDURE — 86738 MYCOPLASMA ANTIBODY: CPT

## 2025-08-19 PROCEDURE — 71250 CT THORAX DX C-: CPT

## 2025-08-19 PROCEDURE — 85027 COMPLETE CBC AUTOMATED: CPT

## 2025-08-19 PROCEDURE — 83735 ASSAY OF MAGNESIUM: CPT

## 2025-08-19 PROCEDURE — 99291 CRITICAL CARE FIRST HOUR: CPT | Mod: 25

## 2025-08-19 PROCEDURE — 94640 AIRWAY INHALATION TREATMENT: CPT

## 2025-08-19 PROCEDURE — 84100 ASSAY OF PHOSPHORUS: CPT

## 2025-08-19 PROCEDURE — 87640 STAPH A DNA AMP PROBE: CPT

## 2025-08-19 PROCEDURE — 82962 GLUCOSE BLOOD TEST: CPT

## 2025-08-19 PROCEDURE — 82009 KETONE BODYS QUAL: CPT

## 2025-08-19 PROCEDURE — 84132 ASSAY OF SERUM POTASSIUM: CPT

## 2025-08-19 PROCEDURE — 81001 URINALYSIS AUTO W/SCOPE: CPT

## 2025-08-19 PROCEDURE — 71045 X-RAY EXAM CHEST 1 VIEW: CPT

## 2025-08-19 PROCEDURE — 85025 COMPLETE CBC W/AUTO DIFF WBC: CPT

## 2025-08-19 PROCEDURE — 82803 BLOOD GASES ANY COMBINATION: CPT

## 2025-08-19 PROCEDURE — 83930 ASSAY OF BLOOD OSMOLALITY: CPT

## 2025-08-19 PROCEDURE — 87086 URINE CULTURE/COLONY COUNT: CPT

## 2025-08-19 PROCEDURE — 87040 BLOOD CULTURE FOR BACTERIA: CPT

## 2025-08-19 PROCEDURE — 93005 ELECTROCARDIOGRAM TRACING: CPT

## 2025-08-19 PROCEDURE — 87641 MR-STAPH DNA AMP PROBE: CPT

## 2025-08-19 PROCEDURE — 83605 ASSAY OF LACTIC ACID: CPT

## 2025-08-19 PROCEDURE — 82010 KETONE BODYS QUAN: CPT

## 2025-08-19 PROCEDURE — 92526 ORAL FUNCTION THERAPY: CPT

## 2025-08-19 PROCEDURE — 80048 BASIC METABOLIC PNL TOTAL CA: CPT

## 2025-08-19 PROCEDURE — 97162 PT EVAL MOD COMPLEX 30 MIN: CPT

## 2025-08-19 PROCEDURE — 80053 COMPREHEN METABOLIC PANEL: CPT

## 2025-08-19 PROCEDURE — 87899 AGENT NOS ASSAY W/OPTIC: CPT

## 2025-08-19 PROCEDURE — 82330 ASSAY OF CALCIUM: CPT

## 2025-08-19 PROCEDURE — 36415 COLL VENOUS BLD VENIPUNCTURE: CPT

## 2025-08-19 PROCEDURE — C8929: CPT

## 2025-08-19 PROCEDURE — 83036 HEMOGLOBIN GLYCOSYLATED A1C: CPT

## 2025-08-19 PROCEDURE — 87637 SARSCOV2&INF A&B&RSV AMP PRB: CPT

## 2025-08-19 PROCEDURE — 96374 THER/PROPH/DIAG INJ IV PUSH: CPT

## 2025-08-19 PROCEDURE — 87186 SC STD MICRODIL/AGAR DIL: CPT

## 2025-08-19 RX ORDER — INSULIN LISPRO 100 U/ML
16 INJECTION, SOLUTION INTRAVENOUS; SUBCUTANEOUS
Qty: 5 | Refills: 0
Start: 2025-08-19 | End: 2025-09-17

## 2025-08-19 RX ORDER — INSULIN GLARGINE-YFGN 100 [IU]/ML
60 INJECTION, SOLUTION SUBCUTANEOUS
Qty: 0 | Refills: 0 | DISCHARGE
Start: 2025-08-19

## 2025-08-19 RX ORDER — INSULIN GLARGINE-YFGN 100 [IU]/ML
60 INJECTION, SOLUTION SUBCUTANEOUS AT BEDTIME
Refills: 0 | Status: DISCONTINUED | OUTPATIENT
Start: 2025-08-19 | End: 2025-08-19

## 2025-08-19 RX ORDER — INSULIN ASPART 100 [IU]/ML
54 INJECTION, SUSPENSION SUBCUTANEOUS
Refills: 0 | DISCHARGE

## 2025-08-19 RX ORDER — NICOTINE POLACRILEX 4 MG/1
1 GUM, CHEWING ORAL
Qty: 0 | Refills: 0 | DISCHARGE
Start: 2025-08-19

## 2025-08-19 RX ORDER — INSULIN ASPART 100 [IU]/ML
70 INJECTION, SUSPENSION SUBCUTANEOUS
Refills: 0 | DISCHARGE

## 2025-08-19 RX ORDER — LIDOCAINE HYDROCHLORIDE 20 MG/ML
1 JELLY TOPICAL
Qty: 0 | Refills: 0 | DISCHARGE
Start: 2025-08-19

## 2025-08-19 RX ADMIN — Medication 650 MILLIGRAM(S): at 10:30

## 2025-08-19 RX ADMIN — INSULIN LISPRO 16 UNIT(S): 100 INJECTION, SOLUTION INTRAVENOUS; SUBCUTANEOUS at 08:53

## 2025-08-19 RX ADMIN — Medication 0.5 MILLIGRAM(S): at 13:13

## 2025-08-19 RX ADMIN — IPRATROPIUM BROMIDE AND ALBUTEROL SULFATE 3 MILLILITER(S): .5; 2.5 SOLUTION RESPIRATORY (INHALATION) at 14:56

## 2025-08-19 RX ADMIN — Medication 0.25 MILLIGRAM(S): at 11:52

## 2025-08-19 RX ADMIN — FUROSEMIDE 40 MILLIGRAM(S): 10 INJECTION INTRAMUSCULAR; INTRAVENOUS at 06:31

## 2025-08-19 RX ADMIN — Medication 25 GRAM(S): at 06:32

## 2025-08-19 RX ADMIN — Medication 0.5 MILLIGRAM(S): at 06:31

## 2025-08-19 RX ADMIN — Medication 81 MILLIGRAM(S): at 11:53

## 2025-08-19 RX ADMIN — IPRATROPIUM BROMIDE AND ALBUTEROL SULFATE 3 MILLILITER(S): .5; 2.5 SOLUTION RESPIRATORY (INHALATION) at 08:43

## 2025-08-19 RX ADMIN — INSULIN LISPRO 6: 100 INJECTION, SOLUTION INTRAVENOUS; SUBCUTANEOUS at 12:35

## 2025-08-19 RX ADMIN — INSULIN LISPRO 16 UNIT(S): 100 INJECTION, SOLUTION INTRAVENOUS; SUBCUTANEOUS at 12:36

## 2025-08-19 RX ADMIN — FINASTERIDE 5 MILLIGRAM(S): 1 TABLET, FILM COATED ORAL at 11:52

## 2025-08-19 RX ADMIN — INSULIN LISPRO 2: 100 INJECTION, SOLUTION INTRAVENOUS; SUBCUTANEOUS at 17:35

## 2025-08-19 RX ADMIN — INSULIN LISPRO 4: 100 INJECTION, SOLUTION INTRAVENOUS; SUBCUTANEOUS at 08:52

## 2025-08-19 RX ADMIN — Medication 650 MILLIGRAM(S): at 09:51

## 2025-08-19 RX ADMIN — Medication 40 MILLIGRAM(S): at 06:32

## 2025-08-19 RX ADMIN — INSULIN LISPRO 16 UNIT(S): 100 INJECTION, SOLUTION INTRAVENOUS; SUBCUTANEOUS at 17:36
